# Patient Record
Sex: FEMALE | Race: WHITE | NOT HISPANIC OR LATINO | Employment: FULL TIME | URBAN - METROPOLITAN AREA
[De-identification: names, ages, dates, MRNs, and addresses within clinical notes are randomized per-mention and may not be internally consistent; named-entity substitution may affect disease eponyms.]

---

## 2019-06-10 ENCOUNTER — OFFICE VISIT (OUTPATIENT)
Dept: URGENT CARE | Facility: CLINIC | Age: 59
End: 2019-06-10
Payer: COMMERCIAL

## 2019-06-10 VITALS
OXYGEN SATURATION: 97 % | HEIGHT: 68 IN | TEMPERATURE: 97.7 F | DIASTOLIC BLOOD PRESSURE: 102 MMHG | WEIGHT: 197 LBS | BODY MASS INDEX: 29.86 KG/M2 | RESPIRATION RATE: 18 BRPM | SYSTOLIC BLOOD PRESSURE: 165 MMHG | HEART RATE: 84 BPM

## 2019-06-10 DIAGNOSIS — L23.7 POISON IVY DERMATITIS: Primary | ICD-10-CM

## 2019-06-10 DIAGNOSIS — T14.8XXA WOUND INFECTION: ICD-10-CM

## 2019-06-10 DIAGNOSIS — L08.9 WOUND INFECTION: ICD-10-CM

## 2019-06-10 PROCEDURE — 99203 OFFICE O/P NEW LOW 30 MIN: CPT | Performed by: EMERGENCY MEDICINE

## 2019-06-10 RX ORDER — PREDNISONE 10 MG/1
TABLET ORAL
Qty: 27 TABLET | Refills: 0 | Status: SHIPPED | OUTPATIENT
Start: 2019-06-10 | End: 2019-07-29 | Stop reason: ALTCHOICE

## 2019-06-10 RX ORDER — CLINDAMYCIN HYDROCHLORIDE 150 MG/1
150 CAPSULE ORAL EVERY 8 HOURS SCHEDULED
Qty: 21 CAPSULE | Refills: 0 | Status: SHIPPED | OUTPATIENT
Start: 2019-06-10 | End: 2019-06-17

## 2019-06-10 RX ORDER — DIPHENHYDRAMINE HCL 50 MG
50 CAPSULE ORAL EVERY 6 HOURS PRN
COMMUNITY

## 2019-06-10 RX ORDER — MULTIVITAMIN
1 CAPSULE ORAL DAILY
COMMUNITY

## 2019-07-29 ENCOUNTER — OFFICE VISIT (OUTPATIENT)
Dept: URGENT CARE | Facility: CLINIC | Age: 59
End: 2019-07-29
Payer: COMMERCIAL

## 2019-07-29 VITALS
OXYGEN SATURATION: 97 % | SYSTOLIC BLOOD PRESSURE: 126 MMHG | RESPIRATION RATE: 18 BRPM | HEART RATE: 95 BPM | WEIGHT: 196 LBS | HEIGHT: 68 IN | DIASTOLIC BLOOD PRESSURE: 69 MMHG | BODY MASS INDEX: 29.7 KG/M2 | TEMPERATURE: 98.2 F

## 2019-07-29 DIAGNOSIS — J06.9 VIRAL UPPER RESPIRATORY TRACT INFECTION: Primary | ICD-10-CM

## 2019-07-29 PROCEDURE — 99213 OFFICE O/P EST LOW 20 MIN: CPT | Performed by: EMERGENCY MEDICINE

## 2019-07-29 RX ORDER — RANITIDINE 150 MG/1
150 CAPSULE ORAL AS NEEDED
COMMUNITY

## 2019-07-29 RX ORDER — PREDNISONE 10 MG/1
TABLET ORAL
Qty: 27 TABLET | Refills: 0 | Status: SHIPPED | OUTPATIENT
Start: 2019-07-29

## 2019-07-29 RX ORDER — ALBUTEROL SULFATE 90 UG/1
2 AEROSOL, METERED RESPIRATORY (INHALATION) EVERY 6 HOURS PRN
Qty: 8.5 G | Refills: 0 | Status: SHIPPED | OUTPATIENT
Start: 2019-07-29

## 2019-07-29 NOTE — LETTER
July 29, 2019     Patient: Guerrero Tony   YOB: 1960   Date of Visit: 7/29/2019       To Whom it May Concern:    Guerrero Tony was seen in my clinic on 7/29/2019  She may return to work on Wednesday, July 31, 2019  If you have any questions or concerns, please don't hesitate to call           Sincerely,          Darshan Steve MD        CC: No Recipients

## 2019-07-29 NOTE — PROGRESS NOTES
[facility]        NAME: Izzy Genao is a 62 y o  female  : 1960    MRN: [id number]  DATE: 2019  TIME: 9:33 AM    Assessment and Plan   Viral upper respiratory tract infection [J06 9]  1  Viral upper respiratory tract infection  predniSONE 10 mg tablet    albuterol (PROAIR HFA) 90 mcg/act inhaler         Patient Instructions     Patient Instructions     You have been diagnosed with a Viral Upper Respiratory infection and your symptoms should resolve over the next 7 to 10 days with the treatments recommended today  If they do not, it is possible that you have developed a bacterial infection and you should return  If you were to take an antibiotic while you are still in the viral stage, you will not get better any faster, but could kill off the good germs in your body as well as make the germs in you resistant to the antibiotic  Take an expectorant - guaifenesin should be the only ingredient - during the day, and the cough suppressant (ex  Robitussin DM or Tessalon) if needed at night only  Take Zinc 12 5 to 15 mg every 2 - 3 hrs while awake for the next few days  You may take Cold John (13 3 mg of Zinc) or split a 25 mg Zinc tablet or lozenge in two or a 50 mg into four to get the proper dose  The total daily dose of Zinc should exceed 75 mg per day  You may also take a decongestant like Sudafed, unless you have hypertension or cardiac disease  Hold any NSAIDs like Ibuprofen (Advil), Naprosyn (Aleve), etc while on steroids like Medrol or Prednisone  If you are diabetic, you should also adhere strictly to your diet and monitor your blood sugar closely while on the steroids as discussed  Upper Respiratory Infection   AMBULATORY CARE:   An upper respiratory infection  is also called a common cold  It can affect your nose, throat, ears, and sinuses  Common signs and symptoms include the following:  Cold symptoms are usually worst for the first 3 to 5 days   You may have any of the following:  · Runny or stuffy nose    · Sneezing and coughing    · Sore throat or hoarseness    · Red, watery, and sore eyes    · Fatigue     · Chills and fever    · Headache, body aches, or sore muscles  Seek care immediately if:   · You have chest pain or trouble breathing  Contact your healthcare provider if:   · You have a fever over 102ºF (39°C)  · Your sore throat gets worse or you see white or yellow spots in your throat  · Your symptoms get worse after 3 to 5 days or your cold is not better in 14 days  · You have a rash anywhere on your skin  · You have large, tender lumps in your neck  · You have thick, green or yellow drainage from your nose  · You cough up thick yellow, green, or bloody mucus  · You have vomiting for more than 24 hours and cannot keep fluids down  · You have a bad earache  · You have questions or concerns about your condition or care  Treatment for a cold: There is no cure for the common cold  Colds are caused by viruses and do not get better with antibiotics  Most people get better in 7 to 14 days  You may continue to cough for 2 to 3 weeks  The following may help decrease your symptoms:  · Decongestants  help reduce nasal congestion and help you breathe more easily  If you take decongestant pills, they may make you feel restless or not able to sleep  Do not use decongestant sprays for more than a few days  · Cough suppressants  help reduce coughing  Ask your healthcare provider which type of cough medicine is best for you  · NSAIDs , such as ibuprofen, help decrease swelling, pain, and fever  NSAIDs can cause stomach bleeding or kidney problems in certain people  If you take blood thinner medicine, always ask your healthcare provider if NSAIDs are safe for you  Always read the medicine label and follow directions  · Acetaminophen  decreases pain and fever  It is available without a doctor's order  Ask how much to take and how often to take it  Follow directions  Read the labels of all other medicines you are using to see if they also contain acetaminophen, or ask your doctor or pharmacist  Acetaminophen can cause liver damage if not taken correctly  Do not use more than 4 grams (4,000 milligrams) total of acetaminophen in one day  Manage your cold:   · Rest as much as possible  Slowly start to do more each day  · Drink more liquids as directed  Liquids will help thin and loosen mucus so you can cough it up  Liquids will also help prevent dehydration  Liquids that help prevent dehydration include water, fruit juice, and broth  Do not drink liquids that contain caffeine  Caffeine can increase your risk for dehydration  Ask your healthcare provider how much liquid to drink each day  · Soothe a sore throat  Gargle with warm salt water  This helps your sore throat feel better  Make salt water by dissolving ¼ teaspoon salt in 1 cup warm water  You may also suck on hard candy or throat lozenges  You may use a sore throat spray  · Use a humidifier or vaporizer  Use a cool mist humidifier or a vaporizer to increase air moisture in your home  This may make it easier for you to breathe and help decrease your cough  · Use saline nasal drops as directed  These help relieve congestion  · Apply petroleum-based jelly around the outside of your nostrils  This can decrease irritation from blowing your nose  · Do not smoke  Nicotine and other chemicals in cigarettes and cigars can make your symptoms worse  They can also cause infections such as bronchitis or pneumonia  Ask your healthcare provider for information if you currently smoke and need help to quit  E-cigarettes or smokeless tobacco still contain nicotine  Talk to your healthcare provider before you use these products  Prevent spreading your cold to others:   · Try to stay away from other people during the first 2 to 3 days of your cold when it is more easily spread       · Do not share food or drinks  · Do not share hand towels with household members  · Wash your hands often, especially after you blow your nose  Turn away from other people and cover your mouth and nose with a tissue when you sneeze or cough  Follow up with your healthcare provider as directed:  Write down your questions so you remember to ask them during your visits  © 2017 [employer] Information is for End User's use only and may not be sold, redistributed or otherwise used for commercial purposes  All illustrations and images included in CareNotes® are the copyrighted property of [employer]  or [employer]  The above information is an  only  It is not intended as medical advice for individual conditions or treatments  Talk to your doctor, nurse or pharmacist before following any medical regimen to see if it is safe and effective for you  Follow up with PCP in 3-5 days  Proceed to  ER if symptoms worsen  Chief Complaint     Chief Complaint   Patient presents with    Cough     began 2 days ago with cough, wheezing, tightness with taking a breath, fever 102 1 max  Tx with Advil cold and sinus with no improvement         History of Present Illness       Patient with cough and congestion for past 2 days  She admits fever but denies chills  She denies shortness of breath  Review of Systems   Review of Systems   Constitutional: Negative for chills and fever  HENT: Positive for congestion, rhinorrhea, sinus pressure and sore throat  Negative for trouble swallowing and voice change  Respiratory: Positive for cough and chest tightness  Negative for shortness of breath and wheezing  Cardiovascular: Negative for chest pain           Current Medications       Current Outpatient Medications:     diphenhydrAMINE (BENADRYL) 50 mg capsule, Take 50 mg by mouth every 6 (six) hours as needed for itching, Disp: , Rfl:     Multiple Vitamin (MULTIVITAMIN) capsule, Take 1 capsule by mouth daily, Disp: , Rfl:     albuterol (PROAIR HFA) 90 mcg/act inhaler, Inhale 2 puffs every 6 (six) hours as needed for wheezing, Disp: 8 5 g, Rfl: 0    predniSONE 10 mg tablet, Take once daily all days pills on this schedule 6- 6- 5- 4- 3- 2- 1, Disp: 27 tablet, Rfl: 0    ranitidine (ZANTAC) 150 MG capsule, Take 150 mg by mouth as needed for indigestion or heartburn, Disp: , Rfl:     Current Allergies     Allergies as of 2019 - Reviewed 2019   Allergen Reaction Noted    Shellfish-derived products Anaphylaxis 06/10/2019            The following portions of the patient's history were reviewed and updated as appropriate: allergies, current medications, past family history, past medical history, past social history, past surgical history and problem list      Past Medical History:   Diagnosis Date    Hypoglycemia        Past Surgical History:   Procedure Laterality Date     SECTION      ECTOPIC PREGNANCY SURGERY      SEPTOPLASTY      TONSILLECTOMY         History reviewed  No pertinent family history  Medications have been verified  Objective   /69   Pulse 95   Temp 98 2 °F (36 8 °C) (Tympanic)   Resp 18   Ht 5' 8" (1 727 m)   Wt 88 9 kg (196 lb)   SpO2 97%   BMI 29 80 kg/m²        Physical Exam     Physical Exam   Constitutional: She is oriented to person, place, and time  She appears well-developed and well-nourished  No distress  HENT:   Head: Normocephalic and atraumatic  Right Ear: Tympanic membrane and external ear normal    Left Ear: Tympanic membrane and external ear normal    Nose: Mucosal edema present  Mouth/Throat: Posterior oropharyngeal erythema present  No oropharyngeal exudate or tonsillar abscesses  Nasal mucosa congested, oropharynx mildly erythematous  Neck: Neck supple  Cardiovascular: Normal rate and regular rhythm  Pulmonary/Chest: Effort normal  No respiratory distress  She has no wheezes   She has no mark    Neurological: She is alert and oriented to person, place, and time  Skin: Skin is warm and dry  Psychiatric: She has a normal mood and affect  Her behavior is normal  Judgment and thought content normal    Nursing note and vitals reviewed

## 2021-09-11 ENCOUNTER — OFFICE VISIT (OUTPATIENT)
Dept: URGENT CARE | Facility: CLINIC | Age: 61
End: 2021-09-11
Payer: COMMERCIAL

## 2021-09-11 VITALS — HEIGHT: 68 IN | BODY MASS INDEX: 28.79 KG/M2 | WEIGHT: 190 LBS

## 2021-09-11 DIAGNOSIS — J01.10 ACUTE FRONTAL SINUSITIS, RECURRENCE NOT SPECIFIED: ICD-10-CM

## 2021-09-11 DIAGNOSIS — R68.89 FLU-LIKE SYMPTOMS: Primary | ICD-10-CM

## 2021-09-11 DIAGNOSIS — R42 VERTIGO: ICD-10-CM

## 2021-09-11 PROCEDURE — U0003 INFECTIOUS AGENT DETECTION BY NUCLEIC ACID (DNA OR RNA); SEVERE ACUTE RESPIRATORY SYNDROME CORONAVIRUS 2 (SARS-COV-2) (CORONAVIRUS DISEASE [COVID-19]), AMPLIFIED PROBE TECHNIQUE, MAKING USE OF HIGH THROUGHPUT TECHNOLOGIES AS DESCRIBED BY CMS-2020-01-R: HCPCS | Performed by: EMERGENCY MEDICINE

## 2021-09-11 PROCEDURE — U0005 INFEC AGEN DETEC AMPLI PROBE: HCPCS | Performed by: EMERGENCY MEDICINE

## 2021-09-11 PROCEDURE — 99213 OFFICE O/P EST LOW 20 MIN: CPT | Performed by: EMERGENCY MEDICINE

## 2021-09-11 RX ORDER — AMOXICILLIN AND CLAVULANATE POTASSIUM 875; 125 MG/1; MG/1
1 TABLET, FILM COATED ORAL EVERY 12 HOURS SCHEDULED
Qty: 20 TABLET | Refills: 0 | Status: SHIPPED | OUTPATIENT
Start: 2021-09-11 | End: 2021-09-21

## 2021-09-11 RX ORDER — MECLIZINE HYDROCHLORIDE 25 MG/1
25 TABLET ORAL EVERY 8 HOURS PRN
Qty: 30 TABLET | Refills: 0 | Status: SHIPPED | OUTPATIENT
Start: 2021-09-11

## 2021-09-11 NOTE — LETTER
September 11, 2021     Patient: Maida Mathews   YOB: 1960   Date of Visit: 9/11/2021       To Whom it May Concern:    Maida Mathews was seen in my clinic on 9/11/2021  She should remain out of work/school for 10 days since symptom onset or 24 hours fever free without the use of fever reducing drugs, whichever is longer AND overall general improvement in symptoms OR 14 days since last exposure or negative results       If you have any questions or concerns, please don't hesitate to call           Sincerely,          Maria Eugenia De Luna MD        CC: No Recipients

## 2021-09-11 NOTE — PATIENT INSTRUCTIONS
You have been diagnosed with a flu-like illness, and your symptoms should resolve over the next 7 to 10 days with the treatments recommended today  If they do not, it is possible that you have developed a bacterial infection and you should return  If you were to take an antibiotic while you are still in the viral stage, you will not get better any faster, but could kill off  good germs in your body as well as make germs  resistant to the antibiotic  Take an expectorant - guaifenesin should be the only ingredient - during the day, and the cough suppressant (ex  Robitussin DM or Tessalon) if needed at night only  Take Zinc 12 5 to 15 mg every 2 - 3 hrs while awake for the next few days  You may take Cold John (13 3 mg of Zinc) or split a 25 mg Zinc tablet or lozenge in two or a 50 mg into four to get the proper dose  The total daily dose of Zinc should exceed 75 mg per day  You should also take Quercetin 500 mg twice daily  May take Flonase as discussed  You may also take vitamin D 3 2000 i u s per day for the next 1 week  You may also take a decongestant like Sudafed, unless you have hypertension or cardiac disease  You may take Imodium for diarrhea according to package instructions  Flu-like illness   AMBULATORY CARE:   Flu-like illness is an infection caused by a virus  The flu is easily spread when an infected person coughs, sneezes, or has close contact with others  You may be able to spread the flu to others for 1 week or longer after signs or symptoms appear  Common signs and symptoms include the following:   · Fever and chills    · Headaches, body aches, and muscle or joint pain    · Cough, runny nose, and sore throat    · Loss of appetite, nausea, vomiting, or diarrhea    · Tiredness    · Trouble breathing  Call 911 for any of the following:   · You have trouble breathing, and your lips look purple or blue  · You have a seizure    Seek care immediately if:   · You are dizzy, or you are urinating less or not at all  · You have a headache with a stiff neck, and you feel tired or confused  · You have new pain or pressure in your chest     · Your symptoms, such as shortness of breath, vomiting, or diarrhea, get worse  · Your symptoms, such as fever and coughing, seem to get better, but then get worse  Contact your healthcare provider if:   · You have new muscle pain or weakness  · You have questions or concerns about your condition or care  Treatment for influenza  may include any of the following:  · Acetaminophen  decreases pain and fever  It is available without a doctor's order  Ask how much to take and how often to take it  Follow directions  Acetaminophen can cause liver damage if not taken correctly  · NSAIDs , such as ibuprofen, help decrease swelling, pain, and fever  This medicine is available with or without a doctor's order  NSAIDs can cause stomach bleeding or kidney problems in certain people  If you take blood thinner medicine, always ask your healthcare provider if NSAIDs are safe for you  Always read the medicine label and follow directions  · Antivirals  help fight a viral infection  Manage your symptoms:   · Rest  as much as you can to help you recover  · Drink liquids as directed  to help prevent dehydration  Ask how much liquid to drink each day and which liquids are best for you  Prevent the spread of the flu:   · Wash your hands often  Use soap and water  Wash your hands after you use the bathroom, change a child's diapers, or sneeze  Wash your hands before you prepare or eat food  Use gel hand cleanser when soap and water are not available  Do not touch your eyes, nose, or mouth unless you have washed your hands first        · Cover your mouth when you sneeze or cough  Cough into a tissue or the bend of your arm  · Clean shared items with a germ-killing   Clean table surfaces, doorknobs, and light switches   Do not share towels, silverware, and dishes with people who are sick  Wash bed sheets, towels, silverware, and dishes with soap and water  · Wear a mask  over your mouth and nose if you are sick or are near anyone who is sick  · Stay away from others  if you are sick  · Influenza vaccine  helps prevent influenza (flu)  Everyone older than 6 months should get a yearly influenza vaccine  Get the vaccine as soon as it is available, usually in September or October each year  Follow up with your healthcare provider as directed:  Write down your questions so you remember to ask them during your visits  © 2017 [address] Information is for End User's use only and may not be sold, redistributed or otherwise used for commercial purposes  All illustrations and images included in CareNotes® are the copyrighted property of [employer]  or [employer]  The above information is an  only  It is not intended as medical advice for individual conditions or treatments  Talk to your doctor, nurse or pharmacist before following any medical regimen to see if it is safe and effective for you  [address]     Your healthcare provider and/or public health staff have evaluated you and have determined that you do not need to be hospitalized at this time  At this time you can be isolated at home where you will be monitored by staff from your local or state health department  You should carefully follow the prevention and isolation steps below until a healthcare provider or local or state health department says that you can return to your normal activities  Stay home except to get medical care     People who are mildly ill with COVID-19 are able to isolate at home during their illness  You should restrict activities outside your home, except for getting medical care  Do not go to work, school, or public areas   Avoid using public transportation, ride-sharing, or taxis  Separate yourself from other people and animals in your home     People: As much as possible, you should stay in a specific room and away from other people in your home  Also, you should use a separate bathroom, if available  Animals: You should restrict contact with pets and other animals while you are sick with COVID-19, just like you would around other people  Although there have not been reports of pets or other animals becoming sick with COVID-19, it is still recommended that people sick with COVID-19 limit contact with animals until more information is known about the virus  When possible, have another member of your household care for your animals while you are sick  If you are sick with COVID-19, avoid contact with your pet, including petting, snuggling, being kissed or licked, and sharing food  If you must care for your pet or be around animals while you are sick, wash your hands before and after you interact with pets and wear a facemask  See COVID-19 and Animals for more information  Call ahead before visiting your doctor     If you have a medical appointment, call the healthcare provider and tell them that you have or may have COVID-19  This will help the healthcare providers office take steps to keep other people from getting infected or exposed  Wear a facemask     You should wear a facemask when you are around other people (e g , sharing a room or vehicle) or pets and before you enter a healthcare providers office  If you are not able to wear a facemask (for example, because it causes trouble breathing), then people who live with you should not stay in the same room with you, or they should wear a facemask if they enter your room  Cover your coughs and sneezes     Cover your mouth and nose with a tissue when you cough or sneeze  Throw used tissues in a lined trash can   Immediately wash your hands with soap and water for at least 20 seconds or, if soap and water are not available, clean your hands with an alcohol-based hand  that contains at least 60% alcohol  Clean your hands often     Wash your hands often with soap and water for at least 20 seconds, especially after blowing your nose, coughing, or sneezing; going to the bathroom; and before eating or preparing food  If soap and water are not readily available, use an alcohol-based hand  with at least 60% alcohol, covering all surfaces of your hands and rubbing them together until they feel dry  Soap and water are the best option if hands are visibly dirty  Avoid touching your eyes, nose, and mouth with unwashed hands  Avoid sharing personal household items     You should not share dishes, drinking glasses, cups, eating utensils, towels, or bedding with other people or pets in your home  After using these items, they should be washed thoroughly with soap and water  Clean all high-touch surfaces everyday     High touch surfaces include counters, tabletops, doorknobs, bathroom fixtures, toilets, phones, keyboards, tablets, and bedside tables  Also, clean any surfaces that may have blood, stool, or body fluids on them  Use a household cleaning spray or wipe, according to the label instructions  Labels contain instructions for safe and effective use of the cleaning product including precautions you should take when applying the product, such as wearing gloves and making sure you have good ventilation during use of the product  Monitor your symptoms     Seek prompt medical attention if your illness is worsening (e g , difficulty breathing)  Before seeking care, call your healthcare provider and tell them that you have, or are being evaluated for, COVID-19  Put on a facemask before you enter the facility  These steps will help the healthcare providers office to keep other people in the office or waiting room from getting infected or exposed   Ask your healthcare provider to call the local or state health department  Persons who are placed under active monitoring or facilitated self-monitoring should follow instructions provided by their local health department or occupational health professionals, as appropriate  If you have a medical emergency and need to call 911, notify the dispatch personnel that you have, or are being evaluated for COVID-19  If possible, put on a facemask before emergency medical services arrive  Discontinuing home isolation     Patients with confirmed COVID-19 should remain under home isolation precautions until the risk of secondary transmission to others is thought to be low  The decision to discontinue home isolation precautions should be made on a case-by-case basis, in consultation with healthcare providers and state and local health departments  Source: [employer]        Proceed to ER if symptoms worsen    Sinusitis, Ambulatory Care   GENERAL INFORMATION:   Sinusitis  is inflammation or infection of your sinuses  It is most often caused by a virus  Acute sinusitis may last up to 12 weeks  Chronic sinusitis lasts longer than 12 weeks  Recurrent sinusitis is when you have 3 or more episodes of sinusitis in 1 year  Common symptoms include the following:   · Fever    · Pain, pressure, redness, or swelling around the forehead, cheeks, or eyes    · Thick yellow or green discharge from your nose    · Tenderness when you touch your face over your sinuses    · Dry cough that happens mostly at night or when you lie down    · Headache and face pain that is worse when you lean forward    · Teeth pain or pain when you chew  Seek immediate care for the following symptoms:   · Vision changes such as double vision    · Confusion or trouble thinking clearly    · Headache and stiff neck    · Trouble breathing  Treatment for sinusitis  may include medicines to relieve nasal and sinus congestion or to decrease pain and fever  Ask your healthcare provider which medicines you should take and how much is safe  Manage sinusitis:   · Drink liquids as directed  Ask your healthcare provider how much liquid to drink each day and which liquids are best for you  Liquids will help loosen and drain the mucus in your sinuses  · Breathe in steam   Heat a bowl of water until you see steam  Lean over the bowl and make a tent over your head with a large towel  Breathe deeply for about 20 minutes  Be careful not to get too close to the steam or burn yourself  Do this 3 times a day  You can also breathe deeply when you take a hot shower  · Rinse your sinuses  Use a sinus rinse device to rinse your nasal passages with a saline (salt water) solution  This will help thin the mucus in your nose and rinse away pollen and dirt  It will also help reduce swelling so you can breathe normally  Ask how often to do this  · Use heat on your sinuses  to decrease pain  Apply heat for 15 to 20 minutes every hour for as many days as directed  · Sleep with your head elevated  Place an extra pillow under your head before you go to sleep to help your sinuses drain  · Do not smoke and avoid secondhand smoke  If you smoke, it is never too late to quit  Ask for information about how to stop smoking if you need help  Prevent the spread of germs that cause sinusitis:  Wash your hands often with soap and water  Wash your hands after you use the bathroom, change a child's diaper, or sneeze  Wash your hands before you prepare or eat food  Follow up with your healthcare provider as directed:  Write down your questions so you remember to ask them during your visits  CARE AGREEMENT:   You have the right to help plan your care  Learn about your health condition and how it may be treated  Discuss treatment options with your caregivers to decide what care you want to receive  You always have the right to refuse treatment   The above information is an educational aid only  It is not intended as medical advice for individual conditions or treatments  Talk to your doctor, nurse or pharmacist before following any medical regimen to see if it is safe and effective for you  © 2014 [address] is for End User's use only and may not be sold, redistributed or otherwise used for commercial purposes  All illustrations and images included in CareNotes® are the copyrighted property of [employer] Inc  or [employer]   WHAT YOU NEED TO KNOW:   Vertigo is a condition that causes you to feel dizzy  You may feel that you or everything around you is moving or spinning  You may also feel like you are being pulled down or toward your side  DISCHARGE INSTRUCTIONS:   Return to the emergency department if:   · You have a headache and a stiff neck  · You have shaking chills and a fever  · You vomit over and over with no relief  · You have blood, pus, or fluid coming out of your ears  · You are confused  Contact your healthcare provider if:   · Your symptoms do not get better with treatment  · You have questions about your condition or care  Medicines:   · Medicine  may be given to help relieve your symptoms  · Take your medicine as directed  Contact your healthcare provider if you think your medicine is not helping or if you have side effects  Tell him or her if you are allergic to any medicine  Keep a list of the medicines, vitamins, and herbs you take  Include the amounts, and when and why you take them  Bring the list or the pill bottles to follow-up visits  Carry your medicine list with you in case of an emergency  Manage your symptoms:   · Do not drive , walk without help, or operate heavy machinery when you are dizzy  · Move slowly  when you move from one position to another position  Get up slowly from sitting or lying down  Sit or lie down right away if you feel dizzy  · Drink plenty of liquids  Liquids help prevent dehydration  Ask how much liquid to drink each day and which liquids are best for you  · Vestibular and balance rehabilitation therapy (VBRT)  is used to teach you exercises to improve your balance and strength  These exercises may help decrease your vertigo and improve your balance  Ask for more information about this therapy  Follow up with your healthcare provider as directed:  Write down your questions so you remember to ask them during your visits  © Copyright "[employer] 2021 Information is for End User's use only and may not be sold, redistributed or otherwise used for commercial purposes  All illustrations and images included in CareNotes® are the copyrighted property of [employer] Inc  or [employer]   The above information is an  only  It is not intended as medical advice for individual conditions or treatments  Talk to your doctor, nurse or pharmacist before following any medical regimen to see if it is safe and effective for you  Eustachian Tube Dysfunction   AMBULATORY CARE:   Eustachian tube dysfunction (ETD)  is a condition that prevents your eustachian tubes from opening properly  It can also cause them to become blocked  Eustachian tubes connect your middle ear to the back of your nose and throat  These tubes open and allow air to flow in and out when you sneeze, swallow, or yawn  Common signs and symptoms include the following:   · Fullness or pressure in your ears    · Muffled hearing, or a feeling you are hearing under water or have clogged ears    · Pain in one or both ears    · Ringing in your ears    · Popping, crackling, or clicking feeling in your ears    · Trouble keeping your balance    Call your doctor or otolaryngologist if:   · Your symptoms do not improve or get worse  · You have a fever  · You have any hearing loss  · You have questions or concerns about your condition or care      Treatment:  ETD may get better on its own without any treatment  If it continues, you may need any of the following:  · Swallow, yawn, or chew gum  to help open your eustachian tubes  Your healthcare provider may also recommend you blow with your mouth shut and your nostrils pinched closed  · Air pressure devices  push air into your nose and eustachian tubes to help relieve air pressure in your ear  · Treatment for allergies  such as decongestants, antihistamines, and nasal steroids may improve ETD  They may help decrease swelling of the eustachian tubes  · A myringotomy  is surgery to make a hole in your eardrum  The hole relieves pressure and lets fluid drain from your ear  A pressure equalizing (PE) tube may be used to keep the hole open and to help drain fluid  · Tuboplasty  is a procedure to widen your eustachian tubes  Follow up with your doctor or otolaryngologist as directed:  Write down your questions so you remember to ask them during your visits  © Copyright [employer] 2021 Information is for End User's use only and may not be sold, redistributed or otherwise used for commercial purposes  All illustrations and images included in CareNotes® are the copyrighted property of [employer]  or [facility] Charlene Jeffries   The above information is an  only  It is not intended as medical advice for individual conditions or treatments  Talk to your doctor, nurse or pharmacist before following any medical regimen to see if it is safe and effective for you

## 2021-09-11 NOTE — PROGRESS NOTES
330Values of n Now        NAME: Jenn Rojas is a 59 y o  female  : 1960    MRN: [id number]  DATE: 2021  TIME: 9:43 AM    Assessment and Plan   Flu-like symptoms [R68 89]  1  Flu-like symptoms  Novel Coronavirus (Covid-19),PCR [facility] [place] - Office Collection   2  Acute frontal sinusitis, recurrence not specified  amoxicillin-clavulanate (AUGMENTIN) 875-125 mg per tablet   3  Vertigo  meclizine (ANTIVERT) 25 mg tablet     Consider prednisone taper if COVID test negative and symptoms persist     Patient Instructions     Patient Instructions     You have been diagnosed with a flu-like illness, and your symptoms should resolve over the next 7 to 10 days with the treatments recommended today  If they do not, it is possible that you have developed a bacterial infection and you should return  If you were to take an antibiotic while you are still in the viral stage, you will not get better any faster, but could kill off  good germs in your body as well as make germs  resistant to the antibiotic  Take an expectorant - guaifenesin should be the only ingredient - during the day, and the cough suppressant (ex  Robitussin DM or Tessalon) if needed at night only  Take Zinc 12 5 to 15 mg every 2 - 3 hrs while awake for the next few days  You may take Cold John (13 3 mg of Zinc) or split a 25 mg Zinc tablet or lozenge in two or a 50 mg into four to get the proper dose  The total daily dose of Zinc should exceed 75 mg per day  You should also take Quercetin 500 mg twice daily  May take Flonase as discussed  You may also take vitamin D 3 2000 i u s per day for the next 1 week  You may also take a decongestant like Sudafed, unless you have hypertension or cardiac disease  You may take Imodium for diarrhea according to package instructions  Flu-like illness   AMBULATORY CARE:   Flu-like illness is an infection caused by a virus   The flu is easily spread when an infected person coughs, sneezes, or has close contact with others  You may be able to spread the flu to others for 1 week or longer after signs or symptoms appear  Common signs and symptoms include the following:   · Fever and chills    · Headaches, body aches, and muscle or joint pain    · Cough, runny nose, and sore throat    · Loss of appetite, nausea, vomiting, or diarrhea    · Tiredness    · Trouble breathing  Call 911 for any of the following:   · You have trouble breathing, and your lips look purple or blue  · You have a seizure  Seek care immediately if:   · You are dizzy, or you are urinating less or not at all  · You have a headache with a stiff neck, and you feel tired or confused  · You have new pain or pressure in your chest     · Your symptoms, such as shortness of breath, vomiting, or diarrhea, get worse  · Your symptoms, such as fever and coughing, seem to get better, but then get worse  Contact your healthcare provider if:   · You have new muscle pain or weakness  · You have questions or concerns about your condition or care  Treatment for influenza  may include any of the following:  · Acetaminophen  decreases pain and fever  It is available without a doctor's order  Ask how much to take and how often to take it  Follow directions  Acetaminophen can cause liver damage if not taken correctly  · NSAIDs , such as ibuprofen, help decrease swelling, pain, and fever  This medicine is available with or without a doctor's order  NSAIDs can cause stomach bleeding or kidney problems in certain people  If you take blood thinner medicine, always ask your healthcare provider if NSAIDs are safe for you  Always read the medicine label and follow directions  · Antivirals  help fight a viral infection  Manage your symptoms:   · Rest  as much as you can to help you recover  · Drink liquids as directed  to help prevent dehydration  Ask how much liquid to drink each day and which liquids are best for you    Prevent the spread of the flu:   · Wash your hands often  Use soap and water  Wash your hands after you use the bathroom, change a child's diapers, or sneeze  Wash your hands before you prepare or eat food  Use gel hand cleanser when soap and water are not available  Do not touch your eyes, nose, or mouth unless you have washed your hands first        · Cover your mouth when you sneeze or cough  Cough into a tissue or the bend of your arm  · Clean shared items with a germ-killing   Clean table surfaces, doorknobs, and light switches  Do not share towels, silverware, and dishes with people who are sick  Wash bed sheets, towels, silverware, and dishes with soap and water  · Wear a mask  over your mouth and nose if you are sick or are near anyone who is sick  · Stay away from others  if you are sick  · Influenza vaccine  helps prevent influenza (flu)  Everyone older than 6 months should get a yearly influenza vaccine  Get the vaccine as soon as it is available, usually in September or October each year  Follow up with your healthcare provider as directed:  Write down your questions so you remember to ask them during your visits  © 2017 [address] Information is for End User's use only and may not be sold, redistributed or otherwise used for commercial purposes  All illustrations and images included in CareNotes® are the copyrighted property of [employer]  or Jessee Luo  The above information is an  only  It is not intended as medical advice for individual conditions or treatments  Talk to your doctor, nurse or pharmacist before following any medical regimen to see if it is safe and effective for you  [address]     Your healthcare provider and/or public health staff have evaluated you and have determined that you do not need to be hospitalized at this time    At this time you can be isolated at home where you will be monitored by staff from your local or state health department  You should carefully follow the prevention and isolation steps below until a healthcare provider or local or state health department says that you can return to your normal activities  Stay home except to get medical care     People who are mildly ill with COVID-19 are able to isolate at home during their illness  You should restrict activities outside your home, except for getting medical care  Do not go to work, school, or public areas  Avoid using public transportation, ride-sharing, or taxis  Separate yourself from other people and animals in your home     People: As much as possible, you should stay in a specific room and away from other people in your home  Also, you should use a separate bathroom, if available  Animals: You should restrict contact with pets and other animals while you are sick with COVID-19, just like you would around other people  Although there have not been reports of pets or other animals becoming sick with COVID-19, it is still recommended that people sick with COVID-19 limit contact with animals until more information is known about the virus  When possible, have another member of your household care for your animals while you are sick  If you are sick with COVID-19, avoid contact with your pet, including petting, snuggling, being kissed or licked, and sharing food  If you must care for your pet or be around animals while you are sick, wash your hands before and after you interact with pets and wear a facemask  See COVID-19 and Animals for more information  Call ahead before visiting your doctor     If you have a medical appointment, call the healthcare provider and tell them that you have or may have COVID-19  This will help the healthcare providers office take steps to keep other people from getting infected or exposed       Wear a facemask     You should wear a facemask when you are around other people (e g , sharing a room or vehicle) or pets and before you enter a healthcare providers office  If you are not able to wear a facemask (for example, because it causes trouble breathing), then people who live with you should not stay in the same room with you, or they should wear a facemask if they enter your room  Cover your coughs and sneezes     Cover your mouth and nose with a tissue when you cough or sneeze  Throw used tissues in a lined trash can  Immediately wash your hands with soap and water for at least 20 seconds or, if soap and water are not available, clean your hands with an alcohol-based hand  that contains at least 60% alcohol  Clean your hands often     Wash your hands often with soap and water for at least 20 seconds, especially after blowing your nose, coughing, or sneezing; going to the bathroom; and before eating or preparing food  If soap and water are not readily available, use an alcohol-based hand  with at least 60% alcohol, covering all surfaces of your hands and rubbing them together until they feel dry  Soap and water are the best option if hands are visibly dirty  Avoid touching your eyes, nose, and mouth with unwashed hands  Avoid sharing personal household items     You should not share dishes, drinking glasses, cups, eating utensils, towels, or bedding with other people or pets in your home  After using these items, they should be washed thoroughly with soap and water  Clean all high-touch surfaces everyday     High touch surfaces include counters, tabletops, doorknobs, bathroom fixtures, toilets, phones, keyboards, tablets, and bedside tables  Also, clean any surfaces that may have blood, stool, or body fluids on them  Use a household cleaning spray or wipe, according to the label instructions   Labels contain instructions for safe and effective use of the cleaning product including precautions you should take when applying the product, such as wearing gloves and making sure you have good ventilation during use of the product  Monitor your symptoms     Seek prompt medical attention if your illness is worsening (e g , difficulty breathing)  Before seeking care, call your healthcare provider and tell them that you have, or are being evaluated for, COVID-19  Put on a facemask before you enter the facility  These steps will help the healthcare providers office to keep other people in the office or waiting room from getting infected or exposed  Ask your healthcare provider to call the local or state health department  Persons who are placed under active monitoring or facilitated self-monitoring should follow instructions provided by their local health department or occupational health professionals, as appropriate  If you have a medical emergency and need to call 911, notify the dispatch personnel that you have, or are being evaluated for COVID-19  If possible, put on a facemask before emergency medical services arrive  Discontinuing home isolation     Patients with confirmed COVID-19 should remain under home isolation precautions until the risk of secondary transmission to others is thought to be low  The decision to discontinue home isolation precautions should be made on a case-by-case basis, in consultation with healthcare providers and [facility] and [facility] health departments  Source: [employer]        Proceed to ER if symptoms worsen    Sinusitis, Ambulatory Care   GENERAL INFORMATION:   Sinusitis  is inflammation or infection of your sinuses  It is most often caused by a virus  Acute sinusitis may last up to 12 weeks  Chronic sinusitis lasts longer than 12 weeks  Recurrent sinusitis is when you have 3 or more episodes of sinusitis in 1 year    Common symptoms include the following:   · Fever    · Pain, pressure, redness, or swelling around the forehead, cheeks, or eyes    · Thick yellow or green discharge from your nose    · Tenderness when you touch your face over your sinuses    · Dry cough that happens mostly at night or when you lie down    · Headache and face pain that is worse when you lean forward    · Teeth pain or pain when you chew  Seek immediate care for the following symptoms:   · Vision changes such as double vision    · Confusion or trouble thinking clearly    · Headache and stiff neck    · Trouble breathing  Treatment for sinusitis  may include medicines to relieve nasal and sinus congestion or to decrease pain and fever  Ask your healthcare provider which medicines you should take and how much is safe  Manage sinusitis:   · Drink liquids as directed  Ask your healthcare provider how much liquid to drink each day and which liquids are best for you  Liquids will help loosen and drain the mucus in your sinuses  · Breathe in steam   Heat a bowl of water until you see steam  Lean over the bowl and make a tent over your head with a large towel  Breathe deeply for about 20 minutes  Be careful not to get too close to the steam or burn yourself  Do this 3 times a day  You can also breathe deeply when you take a hot shower  · Rinse your sinuses  Use a sinus rinse device to rinse your nasal passages with a saline (salt water) solution  This will help thin the mucus in your nose and rinse away pollen and dirt  It will also help reduce swelling so you can breathe normally  Ask how often to do this  · Use heat on your sinuses  to decrease pain  Apply heat for 15 to 20 minutes every hour for as many days as directed  · Sleep with your head elevated  Place an extra pillow under your head before you go to sleep to help your sinuses drain  · Do not smoke and avoid secondhand smoke  If you smoke, it is never too late to quit  Ask for information about how to stop smoking if you need help  Prevent the spread of germs that cause sinusitis:  Wash your hands often with soap and water   Wash your hands after you use the bathroom, change a child's diaper, or sneeze  Wash your hands before you prepare or eat food  Follow up with your healthcare provider as directed:  Write down your questions so you remember to ask them during your visits  CARE AGREEMENT:   You have the right to help plan your care  Learn about your health condition and how it may be treated  Discuss treatment options with your caregivers to decide what care you want to receive  You always have the right to refuse treatment  The above information is an  only  It is not intended as medical advice for individual conditions or treatments  Talk to your doctor, nurse or pharmacist before following any medical regimen to see if it is safe and effective for you  © 2014 [address] is for End User's use only and may not be sold, redistributed or otherwise used for commercial purposes  All illustrations and images included in CareNotes® are the copyrighted property of A [employer] A M [employer]  or Jessee Valerio  Vertigo   WHAT YOU NEED TO KNOW:   Vertigo is a condition that causes you to feel dizzy  You may feel that you or everything around you is moving or spinning  You may also feel like you are being pulled down or toward your side  DISCHARGE INSTRUCTIONS:   Return to the emergency department if:   · You have a headache and a stiff neck  · You have shaking chills and a fever  · You vomit over and over with no relief  · You have blood, pus, or fluid coming out of your ears  · You are confused  Contact your healthcare provider if:   · Your symptoms do not get better with treatment  · You have questions about your condition or care  Medicines:   · Medicine  may be given to help relieve your symptoms  · Take your medicine as directed  Contact your healthcare provider if you think your medicine is not helping or if you have side effects   Tell him or her if you are allergic to any medicine  Keep a list of the medicines, vitamins, and herbs you take  Include the amounts, and when and why you take them  Bring the list or the pill bottles to follow-up visits  Carry your medicine list with you in case of an emergency  Manage your symptoms:   · Do not drive , walk without help, or operate heavy machinery when you are dizzy  · Move slowly  when you move from one position to another position  Get up slowly from sitting or lying down  Sit or lie down right away if you feel dizzy  · Drink plenty of liquids  Liquids help prevent dehydration  Ask how much liquid to drink each day and which liquids are best for you  · Vestibular and balance rehabilitation therapy (VBRT)  is used to teach you exercises to improve your balance and strength  These exercises may help decrease your vertigo and improve your balance  Ask for more information about this therapy  Follow up with your healthcare provider as directed:  Write down your questions so you remember to ask them during your visits  © Copyright [employer] 2021 Information is for End User's use only and may not be sold, redistributed or otherwise used for commercial purposes  All illustrations and images included in CareNotes® are the copyrighted property of A D A M , Inc  or [facility]  The above information is an  only  It is not intended as medical advice for individual conditions or treatments  Talk to your doctor, nurse or pharmacist before following any medical regimen to see if it is safe and effective for you  Eustachian Tube Dysfunction   AMBULATORY CARE:   Eustachian tube dysfunction (ETD)  is a condition that prevents your eustachian tubes from opening properly  It can also cause them to become blocked  Eustachian tubes connect your middle ear to the back of your nose and throat  These tubes open and allow air to flow in and out when you sneeze, swallow, or yawn       Common signs and symptoms include the following:   · Fullness or pressure in your ears    · Muffled hearing, or a feeling you are hearing under water or have clogged ears    · Pain in one or both ears    · Ringing in your ears    · Popping, crackling, or clicking feeling in your ears    · Trouble keeping your balance    Call your doctor or otolaryngologist if:   · Your symptoms do not improve or get worse  · You have a fever  · You have any hearing loss  · You have questions or concerns about your condition or care  Treatment:  ETD may get better on its own without any treatment  If it continues, you may need any of the following:  · Swallow, yawn, or chew gum  to help open your eustachian tubes  Your healthcare provider may also recommend you blow with your mouth shut and your nostrils pinched closed  · Air pressure devices  push air into your nose and eustachian tubes to help relieve air pressure in your ear  · Treatment for allergies  such as decongestants, antihistamines, and nasal steroids may improve ETD  They may help decrease swelling of the eustachian tubes  · A myringotomy  is surgery to make a hole in your eardrum  The hole relieves pressure and lets fluid drain from your ear  A pressure equalizing (PE) tube may be used to keep the hole open and to help drain fluid  · Tuboplasty  is a procedure to widen your eustachian tubes  Follow up with your doctor or otolaryngologist as directed:  Write down your questions so you remember to ask them during your visits  © Copyright [employer] 2021 Information is for End User's use only and may not be sold, redistributed or otherwise used for commercial purposes  All illustrations and images included in CareNotes® are the copyrighted property of [employer]  or [facility] Charlene Jeffries   The above information is an  only  It is not intended as medical advice for individual conditions or treatments   Talk to your doctor, nurse or pharmacist before following any medical regimen to see if it is safe and effective for you  Follow up with PCP in 3-5 days  Proceed to  ER if symptoms worsen  Chief Complaint     Chief Complaint   Patient presents with    COVID-19     sinus and chest congestion with a cough and bilateral earache x 10 days  Intermittent cough  Pt is vaccinated  History of Present Illness       Patient complains of cough and congestion for the past 10 days now with thick, greenish nasal discharge  She also complains of right ear fullness for the past few days and had an episode of vertigo this morning  She claims long history of paroxysmal vertigo but no longer has any Antivert  Review of Systems   Review of Systems   Constitutional: Negative for chills and fever  HENT: Positive for congestion, ear pain, rhinorrhea, sinus pressure and sore throat  Negative for trouble swallowing and voice change  Respiratory: Positive for cough  Negative for chest tightness, shortness of breath and wheezing  Cardiovascular: Negative for chest pain  Neurological: Positive for dizziness           Current Medications       Current Outpatient Medications:     albuterol (PROAIR HFA) 90 mcg/act inhaler, Inhale 2 puffs every 6 (six) hours as needed for wheezing, Disp: 8 5 g, Rfl: 0    Aspirin Buf,CaCarb-MgCarb-MgO, 81 MG TABS, Take by mouth, Disp: , Rfl:     Multiple Vitamin (MULTIVITAMIN) capsule, Take 1 capsule by mouth daily, Disp: , Rfl:     amoxicillin-clavulanate (AUGMENTIN) 875-125 mg per tablet, Take 1 tablet by mouth every 12 (twelve) hours for 10 days, Disp: 20 tablet, Rfl: 0    diphenhydrAMINE (BENADRYL) 50 mg capsule, Take 50 mg by mouth every 6 (six) hours as needed for itching, Disp: , Rfl:     meclizine (ANTIVERT) 25 mg tablet, Take 1 tablet (25 mg total) by mouth every 8 (eight) hours as needed for dizziness, Disp: 30 tablet, Rfl: 0    predniSONE 10 mg tablet, Take once daily all days pills on this schedule 6- 6- 5- 4- 3- 2- 1, Disp: 27 tablet, Rfl: 0    ranitidine (ZANTAC) 150 MG capsule, Take 150 mg by mouth as needed for indigestion or heartburn, Disp: , Rfl:     Current Allergies     Allergies as of 2021 - Reviewed 2021   Allergen Reaction Noted    Shellfish-derived products - food allergy Anaphylaxis 06/10/2019            The following portions of the patient's history were reviewed and updated as appropriate: allergies, current medications, past family history, past medical history, past social history, past surgical history and problem list      Past Medical History:   Diagnosis Date    Hypoglycemia        Past Surgical History:   Procedure Laterality Date    ADENOIDECTOMY       SECTION      ECTOPIC PREGNANCY SURGERY      SEPTOPLASTY      TONSILLECTOMY         Family History   Problem Relation Age of Onset    Depression Mother     Heart disease Father          Medications have been verified  Objective   Ht 5' 8" (1 727 m)   Wt 86 2 kg (190 lb)   BMI 28 89 kg/m²        Physical Exam     Physical Exam  Vitals and nursing note reviewed  Constitutional:       General: She is not in acute distress  Appearance: She is well-developed  HENT:      Head: Normocephalic and atraumatic  Nose: Mucosal edema and congestion present  Mouth/Throat:      Pharynx: Posterior oropharyngeal erythema present  No oropharyngeal exudate  Tonsils: No tonsillar abscesses  Cardiovascular:      Rate and Rhythm: Regular rhythm  Comments: Mild tachycardia  Pulmonary:      Effort: Pulmonary effort is normal  No respiratory distress  Breath sounds: No wheezing or rales  Abdominal:      General: Bowel sounds are normal       Palpations: Abdomen is soft  Musculoskeletal:      Cervical back: Neck supple  Skin:     General: Skin is warm and dry  Findings: No rash  Neurological:      Mental Status: She is alert and oriented to person, place, and time     Psychiatric: Mood and Affect: Mood normal          Behavior: Behavior normal          Thought Content:  Thought content normal          Judgment: Judgment normal

## 2021-09-13 LAB — SARS-COV-2 RNA RESP QL NAA+PROBE: NEGATIVE

## 2022-01-03 ENCOUNTER — OFFICE VISIT (OUTPATIENT)
Dept: URGENT CARE | Facility: CLINIC | Age: 62
End: 2022-01-03
Payer: COMMERCIAL

## 2022-01-03 VITALS
HEART RATE: 100 BPM | TEMPERATURE: 97.9 F | OXYGEN SATURATION: 98 % | HEIGHT: 68 IN | RESPIRATION RATE: 18 BRPM | WEIGHT: 190 LBS | BODY MASS INDEX: 28.79 KG/M2

## 2022-01-03 DIAGNOSIS — R68.89 FLU-LIKE SYMPTOMS: Primary | ICD-10-CM

## 2022-01-03 PROCEDURE — 87636 SARSCOV2 & INF A&B AMP PRB: CPT | Performed by: EMERGENCY MEDICINE

## 2022-01-03 PROCEDURE — 99213 OFFICE O/P EST LOW 20 MIN: CPT | Performed by: EMERGENCY MEDICINE

## 2022-01-03 RX ORDER — ASPIRIN 81 MG/1
81 TABLET, CHEWABLE ORAL DAILY
COMMUNITY

## 2022-01-03 NOTE — LETTER
January 3, 2022     Patient: Sam Boles   YOB: 1960   Date of Visit: 1/3/2022       To Whom it May Concern:    Sam Boles was seen in my clinic on 1/3/2022  She should remain out of work/school for 10 days since symptom onset or 24 hours fever free without the use of fever reducing drugs, whichever is longer AND overall general improvement in symptoms OR 14 days since last exposure or negative results       If you have any questions or concerns, please don't hesitate to call           Sincerely,          Jasbir Koehler MD        CC: No Recipients

## 2022-01-03 NOTE — PROGRESS NOTES
[facility]        NAME: Shawna Moffett is a 59 y o  female  : 1960    MRN: [id number]  DATE: January 3, 2022  TIME: 9:09 AM    Assessment and Plan   Flu-like symptoms [R68 89]  1  Flu-like symptoms  COVID/FLU- Office Collect         Patient Instructions     Patient Instructions     You have been diagnosed with a flu-like illness, and your symptoms should resolve over the next 7 to 10 days with the treatments recommended today  If they do not, it is possible that you have developed a bacterial infection and you should return  If you were to take an antibiotic while you are still in the viral stage, you will not get better any faster, but could kill off good germs in your body as well as make germs resistant to the antibiotic  Take an expectorant - guaifenesin should be the only ingredient - during the day, and the cough suppressant (ex  Robitussin DM or Tessalon) if needed at night only  Take Zinc 50 mg every 12 hours for the next week  You should also take Quercetin 500 mg twice daily with it  You should also take vitamin D3 5000 i u s per day for the next 1 week, and vitamin-C 1 g daily  May take Flonase as discussed  You may also take a decongestant like Sudafed, unless you have hypertension or cardiac disease  You may take Imodium for diarrhea according to package instructions  Flu-like illness   AMBULATORY CARE:   Flu-like illness is an infection caused by a virus  The flu is easily spread when an infected person coughs, sneezes, or has close contact with others  You may be able to spread the flu to others for 1 week or longer after signs or symptoms appear     Common signs and symptoms include the following:   · Fever and chills    · Headaches, body aches, and muscle or joint pain    · Cough, runny nose, and sore throat    · Loss of appetite, nausea, vomiting, or diarrhea    · Tiredness    · Trouble breathing  Call 911 for any of the following:   · You have trouble breathing, and your lips look purple or blue  · You have a seizure  Seek care immediately if:   · You are dizzy, or you are urinating less or not at all  · You have a headache with a stiff neck, and you feel tired or confused  · You have new pain or pressure in your chest     · Your symptoms, such as shortness of breath, vomiting, or diarrhea, get worse  · Your symptoms, such as fever and coughing, seem to get better, but then get worse  Contact your healthcare provider if:   · You have new muscle pain or weakness  · You have questions or concerns about your condition or care  Treatment for influenza  may include any of the following:  · Acetaminophen decreases pain and fever  It is available without a doctor's order  Ask how much to take and how often to take it  Follow directions  Acetaminophen can cause liver damage if not taken correctly  · NSAIDs  such as ibuprofen, help decrease swelling, pain, and fever  This medicine is available with or without a doctor's order  NSAIDs can cause stomach bleeding or kidney problems in certain people  If you take blood thinner medicine, always ask your healthcare provider if NSAIDs are safe for you  Always read the medicine label and follow directions  · Antivirals  help fight a viral infection  Manage your symptoms:   · Rest  as much as you can to help you recover  · Drink liquids as directed  to help prevent dehydration  Ask how much liquid to drink each day and which liquids are best for you  Prevent the spread of the flu:   · Wash your hands often  Use soap and water  Wash your hands after you use the bathroom, change a child's diapers, or sneeze  Wash your hands before you prepare or eat food  Use gel hand cleanser when soap and water are not available  Do not touch your eyes, nose, or mouth unless you have washed your hands first        · Cover your mouth when you sneeze or cough  Cough into a tissue or the bend of your arm      · Clean shared items with a germ-killing   Clean table surfaces, doorknobs, and light switches  Do not share towels, silverware, and dishes with people who are sick  Wash bed sheets, towels, silverware, and dishes with soap and water  · Wear a mask  over your mouth and nose if you are sick or are near anyone who is sick  · Stay away from others  if you are sick  · Influenza vaccine  helps prevent influenza (flu)  Everyone older than 6 months should get a yearly influenza vaccine  Get the vaccine as soon as it is available, usually in September or October each year  Follow up with your healthcare provider as directed:  Write down your questions so you remember to ask them during your visits  © 2017 [address] Information is for End User's use only and may not be sold, redistributed or otherwise used for commercial purposes  All illustrations and images included in CareNotes® are the copyrighted property of [employer]  or [employer]  The above information is an  only  It is not intended as medical advice for individual conditions or treatments  Talk to your doctor, nurse or pharmacist before following any medical regimen to see if it is safe and effective for you  [address]     Your healthcare provider and/or public health staff have evaluated you and have determined that you do not need to be hospitalized at this time  At this time you can be isolated at home where you will be monitored by staff from your local or state health department  You should carefully follow the prevention and isolation steps below until a healthcare provider or local or state health department says that you can return to your normal activities  Stay home except to get medical care     People who are mildly ill with COVID-19 are able to isolate at home during their illness  You should restrict activities outside your home, except for getting medical care  Do not go to work, school, or public areas  Avoid using public transportation, ride-sharing, or taxis  Separate yourself from other people and animals in your home     People: As much as possible, you should stay in a specific room and away from other people in your home  Also, you should use a separate bathroom, if available  Animals: You should restrict contact with pets and other animals while you are sick with COVID-19, just like you would around other people  Although there have not been reports of pets or other animals becoming sick with COVID-19, it is still recommended that people sick with COVID-19 limit contact with animals until more information is known about the virus  When possible, have another member of your household care for your animals while you are sick  If you are sick with COVID-19, avoid contact with your pet, including petting, snuggling, being kissed or licked, and sharing food  If you must care for your pet or be around animals while you are sick, wash your hands before and after you interact with pets and wear a facemask  See COVID-19 and Animals for more information  Call ahead before visiting your doctor     If you have a medical appointment, call the healthcare provider and tell them that you have or may have COVID-19  This will help the healthcare providers office take steps to keep other people from getting infected or exposed  Wear a facemask     You should wear a facemask when you are around other people (e g , sharing a room or vehicle) or pets and before you enter a healthcare providers office  If you are not able to wear a facemask (for example, because it causes trouble breathing), then people who live with you should not stay in the same room with you, or they should wear a facemask if they enter your room  Cover your coughs and sneezes     Cover your mouth and nose with a tissue when you cough or sneeze  Throw used tissues in a lined trash can   Immediately wash your hands with soap and water for at least 20 seconds or, if soap and water are not available, clean your hands with an alcohol-based hand  that contains at least 60% alcohol  Clean your hands often     Wash your hands often with soap and water for at least 20 seconds, especially after blowing your nose, coughing, or sneezing; going to the bathroom; and before eating or preparing food  If soap and water are not readily available, use an alcohol-based hand  with at least 60% alcohol, covering all surfaces of your hands and rubbing them together until they feel dry  Soap and water are the best option if hands are visibly dirty  Avoid touching your eyes, nose, and mouth with unwashed hands  Avoid sharing personal household items     You should not share dishes, drinking glasses, cups, eating utensils, towels, or bedding with other people or pets in your home  After using these items, they should be washed thoroughly with soap and water  Clean all high-touch surfaces everyday     High touch surfaces include counters, tabletops, doorknobs, bathroom fixtures, toilets, phones, keyboards, tablets, and bedside tables  Also, clean any surfaces that may have blood, stool, or body fluids on them  Use a household cleaning spray or wipe, according to the label instructions  Labels contain instructions for safe and effective use of the cleaning product including precautions you should take when applying the product, such as wearing gloves and making sure you have good ventilation during use of the product  Monitor your symptoms     Seek prompt medical attention if your illness is worsening (e g , difficulty breathing)  Before seeking care, call your healthcare provider and tell them that you have, or are being evaluated for, COVID-19  Put on a facemask before you enter the facility   These steps will help the healthcare providers office to keep other people in the office or waiting room from getting infected or exposed  Ask your healthcare provider to call the local or state health department  Persons who are placed under active monitoring or facilitated self-monitoring should follow instructions provided by their local health department or occupational health professionals, as appropriate  If you have a medical emergency and need to call 911, notify the dispatch personnel that you have, or are being evaluated for COVID-19  If possible, put on a facemask before emergency medical services arrive  Discontinuing home isolation     Patients with confirmed COVID-19 should remain under home isolation precautions until the risk of secondary transmission to others is thought to be low  The decision to discontinue home isolation precautions should be made on a case-by-case basis, in consultation with healthcare providers and [facility] and [facility] health departments  Source: [employer]        Proceed to ER if symptoms worsen        Follow up with PCP in 3-5 days  Proceed to  ER if symptoms worsen  Chief Complaint     Chief Complaint   Patient presents with    COVID-19     c/o sore throat, sinus congestion, muscles aches, low grade fever and exposure to 5 people         History of Present Illness       Patient complains of sore throat, cough, congestion, generalized aches, fevers for the past 2 days  Review of Systems   Review of Systems   Constitutional: Positive for fever  Negative for appetite change, chills and fatigue  HENT: Positive for congestion, rhinorrhea, sinus pressure and sore throat  Negative for trouble swallowing and voice change  Respiratory: Positive for cough  Negative for chest tightness, shortness of breath and wheezing  Cardiovascular: Negative for chest pain  Gastrointestinal: Negative for nausea  Musculoskeletal: Positive for myalgias           Current Medications       Current Outpatient Medications:     aspirin 81 mg chewable tablet, Chew 81 mg daily, Disp: , Rfl:     diphenhydrAMINE (BENADRYL) 50 mg capsule, Take 50 mg by mouth every 6 (six) hours as needed for itching, Disp: , Rfl:     Multiple Vitamin (MULTIVITAMIN) capsule, Take 1 capsule by mouth daily, Disp: , Rfl:     albuterol (PROAIR HFA) 90 mcg/act inhaler, Inhale 2 puffs every 6 (six) hours as needed for wheezing, Disp: 8 5 g, Rfl: 0    Aspirin Buf,CaCarb-MgCarb-MgO, 81 MG TABS, Take by mouth, Disp: , Rfl:     meclizine (ANTIVERT) 25 mg tablet, Take 1 tablet (25 mg total) by mouth every 8 (eight) hours as needed for dizziness, Disp: 30 tablet, Rfl: 0    predniSONE 10 mg tablet, Take once daily all days pills on this schedule 6- 6- 5- 4- 3- 2- 1, Disp: 27 tablet, Rfl: 0    ranitidine (ZANTAC) 150 MG capsule, Take 150 mg by mouth as needed for indigestion or heartburn, Disp: , Rfl:     Current Allergies     Allergies as of 2022 - Reviewed 2022   Allergen Reaction Noted    Shellfish-derived products - food allergy Anaphylaxis 06/10/2019    Azithromycin Rash 10/10/2012            The following portions of the patient's history were reviewed and updated as appropriate: allergies, current medications, past family history, past medical history, past social history, past surgical history and problem list      Past Medical History:   Diagnosis Date    Hypoglycemia        Past Surgical History:   Procedure Laterality Date    ADENOIDECTOMY       SECTION      ECTOPIC PREGNANCY SURGERY      SEPTOPLASTY      TONSILLECTOMY         Family History   Problem Relation Age of Onset    Depression Mother     Heart disease Father          Medications have been verified  Objective   Pulse 100   Temp 97 9 °F (36 6 °C)   Resp 18   Ht 5' 8" (1 727 m)   Wt 86 2 kg (190 lb)   SpO2 98%   BMI 28 89 kg/m²        Physical Exam     Physical Exam  Vitals and nursing note reviewed     Constitutional:       General: She is not in acute distress  Appearance: She is well-developed  HENT:      Head: Normocephalic and atraumatic  Nose: Mucosal edema and congestion present  Mouth/Throat:      Pharynx: Oropharynx is clear  Posterior oropharyngeal erythema present  No oropharyngeal exudate  Tonsils: No tonsillar abscesses  Cardiovascular:      Rate and Rhythm: Normal rate and regular rhythm  Heart sounds: Normal heart sounds  No murmur heard  No friction rub  No gallop  Pulmonary:      Effort: Pulmonary effort is normal  No respiratory distress  Breath sounds: No wheezing or rales  Musculoskeletal:      Cervical back: Neck supple  Skin:     General: Skin is warm and dry  Neurological:      Mental Status: She is alert and oriented to person, place, and time  Psychiatric:         Mood and Affect: Mood normal          Behavior: Behavior normal          Thought Content:  Thought content normal          Judgment: Judgment normal

## 2022-01-03 NOTE — PATIENT INSTRUCTIONS
You have been diagnosed with a flu-like illness, and your symptoms should resolve over the next 7 to 10 days with the treatments recommended today  If they do not, it is possible that you have developed a bacterial infection and you should return  If you were to take an antibiotic while you are still in the viral stage, you will not get better any faster, but could kill off good germs in your body as well as make germs resistant to the antibiotic  Take an expectorant - guaifenesin should be the only ingredient - during the day, and the cough suppressant (ex  Robitussin DM or Tessalon) if needed at night only  Take Zinc 50 mg every 12 hours for the next week  You should also take Quercetin 500 mg twice daily with it  You should also take vitamin D3 5000 i u s per day for the next 1 week, and vitamin-C 1 g daily  May take Flonase as discussed  You may also take a decongestant like Sudafed, unless you have hypertension or cardiac disease  You may take Imodium for diarrhea according to package instructions  Flu-like illness   AMBULATORY CARE:   Flu-like illness is an infection caused by a virus  The flu is easily spread when an infected person coughs, sneezes, or has close contact with others  You may be able to spread the flu to others for 1 week or longer after signs or symptoms appear  Common signs and symptoms include the following:   · Fever and chills    · Headaches, body aches, and muscle or joint pain    · Cough, runny nose, and sore throat    · Loss of appetite, nausea, vomiting, or diarrhea    · Tiredness    · Trouble breathing  Call 911 for any of the following:   · You have trouble breathing, and your lips look purple or blue  · You have a seizure  Seek care immediately if:   · You are dizzy, or you are urinating less or not at all  · You have a headache with a stiff neck, and you feel tired or confused      · You have new pain or pressure in your chest     · Your symptoms, such as shortness of breath, vomiting, or diarrhea, get worse  · Your symptoms, such as fever and coughing, seem to get better, but then get worse  Contact your healthcare provider if:   · You have new muscle pain or weakness  · You have questions or concerns about your condition or care  Treatment for influenza  may include any of the following:  · Acetaminophen decreases pain and fever  It is available without a doctor's order  Ask how much to take and how often to take it  Follow directions  Acetaminophen can cause liver damage if not taken correctly  · NSAIDs  such as ibuprofen, help decrease swelling, pain, and fever  This medicine is available with or without a doctor's order  NSAIDs can cause stomach bleeding or kidney problems in certain people  If you take blood thinner medicine, always ask your healthcare provider if NSAIDs are safe for you  Always read the medicine label and follow directions  · Antivirals  help fight a viral infection  Manage your symptoms:   · Rest  as much as you can to help you recover  · Drink liquids as directed  to help prevent dehydration  Ask how much liquid to drink each day and which liquids are best for you  Prevent the spread of the flu:   · Wash your hands often  Use soap and water  Wash your hands after you use the bathroom, change a child's diapers, or sneeze  Wash your hands before you prepare or eat food  Use gel hand cleanser when soap and water are not available  Do not touch your eyes, nose, or mouth unless you have washed your hands first        · Cover your mouth when you sneeze or cough  Cough into a tissue or the bend of your arm  · Clean shared items with a germ-killing   Clean table surfaces, doorknobs, and light switches  Do not share towels, silverware, and dishes with people who are sick  Wash bed sheets, towels, silverware, and dishes with soap and water       · Wear a mask  over your mouth and nose if you are sick or are near anyone who is sick  · Stay away from others  if you are sick  · Influenza vaccine  helps prevent influenza (flu)  Everyone older than 6 months should get a yearly influenza vaccine  Get the vaccine as soon as it is available, usually in September or October each year  Follow up with your healthcare provider as directed:  Write down your questions so you remember to ask them during your visits  © 2017 [address] Information is for End User's use only and may not be sold, redistributed or otherwise used for commercial purposes  All illustrations and images included in CareNotes® are the copyrighted property of [employer]  or Jessee Luo  The above information is an  only  It is not intended as medical advice for individual conditions or treatments  Talk to your doctor, nurse or pharmacist before following any medical regimen to see if it is safe and effective for you  [address]     Your healthcare provider and/or public health staff have evaluated you and have determined that you do not need to be hospitalized at this time  At this time you can be isolated at home where you will be monitored by staff from your local or state health department  You should carefully follow the prevention and isolation steps below until a healthcare provider or local or state health department says that you can return to your normal activities  Stay home except to get medical care     People who are mildly ill with COVID-19 are able to isolate at home during their illness  You should restrict activities outside your home, except for getting medical care  Do not go to work, school, or public areas  Avoid using public transportation, ride-sharing, or taxis  Separate yourself from other people and animals in your home     People: As much as possible, you should stay in a specific room and away from other people in your home   Also, you should use a separate bathroom, if available  Animals: You should restrict contact with pets and other animals while you are sick with COVID-19, just like you would around other people  Although there have not been reports of pets or other animals becoming sick with COVID-19, it is still recommended that people sick with COVID-19 limit contact with animals until more information is known about the virus  When possible, have another member of your household care for your animals while you are sick  If you are sick with COVID-19, avoid contact with your pet, including petting, snuggling, being kissed or licked, and sharing food  If you must care for your pet or be around animals while you are sick, wash your hands before and after you interact with pets and wear a facemask  See COVID-19 and Animals for more information  Call ahead before visiting your doctor     If you have a medical appointment, call the healthcare provider and tell them that you have or may have COVID-19  This will help the healthcare providers office take steps to keep other people from getting infected or exposed  Wear a facemask     You should wear a facemask when you are around other people (e g , sharing a room or vehicle) or pets and before you enter a healthcare providers office  If you are not able to wear a facemask (for example, because it causes trouble breathing), then people who live with you should not stay in the same room with you, or they should wear a facemask if they enter your room  Cover your coughs and sneezes     Cover your mouth and nose with a tissue when you cough or sneeze  Throw used tissues in a lined trash can  Immediately wash your hands with soap and water for at least 20 seconds or, if soap and water are not available, clean your hands with an alcohol-based hand  that contains at least 60% alcohol       Clean your hands often     Wash your hands often with soap and water for at least 20 seconds, especially after blowing your nose, coughing, or sneezing; going to the bathroom; and before eating or preparing food  If soap and water are not readily available, use an alcohol-based hand  with at least 60% alcohol, covering all surfaces of your hands and rubbing them together until they feel dry  Soap and water are the best option if hands are visibly dirty  Avoid touching your eyes, nose, and mouth with unwashed hands  Avoid sharing personal household items     You should not share dishes, drinking glasses, cups, eating utensils, towels, or bedding with other people or pets in your home  After using these items, they should be washed thoroughly with soap and water  Clean all high-touch surfaces everyday     High touch surfaces include counters, tabletops, doorknobs, bathroom fixtures, toilets, phones, keyboards, tablets, and bedside tables  Also, clean any surfaces that may have blood, stool, or body fluids on them  Use a household cleaning spray or wipe, according to the label instructions  Labels contain instructions for safe and effective use of the cleaning product including precautions you should take when applying the product, such as wearing gloves and making sure you have good ventilation during use of the product  Monitor your symptoms     Seek prompt medical attention if your illness is worsening (e g , difficulty breathing)  Before seeking care, call your healthcare provider and tell them that you have, or are being evaluated for, COVID-19  Put on a facemask before you enter the facility  These steps will help the healthcare providers office to keep other people in the office or waiting room from getting infected or exposed  Ask your healthcare provider to call the local or [facility] health department   Persons who are placed under active monitoring or facilitated self-monitoring should follow instructions provided by their local health department or occupational health professionals, as appropriate  If you have a medical emergency and need to call 911, notify the dispatch personnel that you have, or are being evaluated for COVID-19  If possible, put on a facemask before emergency medical services arrive  Discontinuing home isolation     Patients with confirmed COVID-19 should remain under home isolation precautions until the risk of secondary transmission to others is thought to be low  The decision to discontinue home isolation precautions should be made on a case-by-case basis, in consultation with healthcare providers and state and local health departments       Source: [employer]        Proceed to ER if symptoms worsen

## 2022-01-06 ENCOUNTER — TELEPHONE (OUTPATIENT)
Dept: URGENT CARE | Facility: CLINIC | Age: 62
End: 2022-01-06

## 2022-01-07 ENCOUNTER — TELEPHONE (OUTPATIENT)
Dept: URGENT CARE | Facility: CLINIC | Age: 62
End: 2022-01-07

## 2022-01-07 LAB
FLUAV RNA RESP QL NAA+PROBE: NEGATIVE
FLUBV RNA RESP QL NAA+PROBE: NEGATIVE
SARS-COV-2 RNA RESP QL NAA+PROBE: NEGATIVE

## 2022-07-17 ENCOUNTER — OFFICE VISIT (OUTPATIENT)
Dept: URGENT CARE | Facility: CLINIC | Age: 62
End: 2022-07-17
Payer: COMMERCIAL

## 2022-07-17 VITALS
RESPIRATION RATE: 16 BRPM | SYSTOLIC BLOOD PRESSURE: 163 MMHG | WEIGHT: 185 LBS | TEMPERATURE: 98 F | DIASTOLIC BLOOD PRESSURE: 67 MMHG | HEIGHT: 68 IN | HEART RATE: 78 BPM | BODY MASS INDEX: 28.04 KG/M2 | OXYGEN SATURATION: 99 %

## 2022-07-17 DIAGNOSIS — S91.331A PUNCTURE WOUND OF RIGHT FOOT, INITIAL ENCOUNTER: Primary | ICD-10-CM

## 2022-07-17 PROCEDURE — 99213 OFFICE O/P EST LOW 20 MIN: CPT | Performed by: EMERGENCY MEDICINE

## 2022-07-17 RX ORDER — CEPHALEXIN 500 MG/1
500 CAPSULE ORAL EVERY 8 HOURS SCHEDULED
Qty: 15 CAPSULE | Refills: 0 | Status: SHIPPED | OUTPATIENT
Start: 2022-07-17 | End: 2022-07-22

## 2022-07-17 NOTE — PATIENT INSTRUCTIONS
Puncture Wound   WHAT YOU NEED TO KNOW:   A puncture wound is a hole in the skin made by a sharp, pointed object  The area may be bruised or swollen  You may have bleeding, pain, or trouble moving the affected area  DISCHARGE INSTRUCTIONS:   Return to the emergency department if:   You have severe pain  You have numbness or tingling in the area of your wound  Your wound starts bleeding and does not stop, even after you apply pressure  Call your doctor if:   You have new drainage or a bad odor coming from the wound  You have a fever or chills  You have increased swelling, redness, or pain  You have red streaks on your skin coming from your wound  You have questions or concerns about your condition or care  Medicines: You may need any of the following:  NSAIDs , such as ibuprofen, help decrease swelling, pain, and fever  This medicine is available with or without a doctor's order  NSAIDs can cause stomach bleeding or kidney problems in certain people  If you take blood thinner medicine, always ask your healthcare provider if NSAIDs are safe for you  Always read the medicine label and follow directions  Antibiotics  help prevent a bacterial infection  Take your medicine as directed  Contact your healthcare provider if you think your medicine is not helping or if you have side effects  Tell him of her if you are allergic to any medicine  Keep a list of the medicines, vitamins, and herbs you take  Include the amounts, and when and why you take them  Bring the list or the pill bottles to follow-up visits  Carry your medicine list with you in case of an emergency  Care for your wound as directed:  Keep your wound clean and dry  When you are allowed to bathe, carefully wash the wound with soap and water  Dry the area and put on new, clean bandages as directed  Change your bandages when they get wet or dirty    Rest and elevate  the injured area above the level of your heart as often as you can  This will help decrease swelling and pain  Prop your injured area on pillows or blankets to keep it elevated comfortably  Follow up with your doctor in 2 to 3 days:  Write down your questions so you remember to ask them during your visits  © Copyright [employer] 2022 Information is for End User's use only and may not be sold, redistributed or otherwise used for commercial purposes  All illustrations and images included in CareNotes® are the copyrighted property of [employer]  or Fito Jeffries   The above information is an  only  It is not intended as medical advice for individual conditions or treatments  Talk to your doctor, nurse or pharmacist before following any medical regimen to see if it is safe and effective for you

## 2022-07-17 NOTE — PROGRESS NOTES
[facility]        NAME: Jose Ramesh is a 57 y o  female  : 1960    MRN: [id number]  DATE: 2022  TIME: 1:10 PM    Assessment and Plan   Puncture wound of right foot, initial encounter [S91 331A]  1  Puncture wound of right foot, initial encounter  cephalexin (KEFLEX) 500 mg capsule         Patient Instructions     Patient Instructions     Puncture Wound   WHAT YOU NEED TO KNOW:   A puncture wound is a hole in the skin made by a sharp, pointed object  The area may be bruised or swollen  You may have bleeding, pain, or trouble moving the affected area  DISCHARGE INSTRUCTIONS:   Return to the emergency department if:   · You have severe pain  · You have numbness or tingling in the area of your wound  · Your wound starts bleeding and does not stop, even after you apply pressure  Call your doctor if:   · You have new drainage or a bad odor coming from the wound  · You have a fever or chills  · You have increased swelling, redness, or pain  · You have red streaks on your skin coming from your wound  · You have questions or concerns about your condition or care  Medicines: You may need any of the following:  · NSAIDs , such as ibuprofen, help decrease swelling, pain, and fever  This medicine is available with or without a doctor's order  NSAIDs can cause stomach bleeding or kidney problems in certain people  If you take blood thinner medicine, always ask your healthcare provider if NSAIDs are safe for you  Always read the medicine label and follow directions  · Antibiotics  help prevent a bacterial infection  · Take your medicine as directed  Contact your healthcare provider if you think your medicine is not helping or if you have side effects  Tell him of her if you are allergic to any medicine  Keep a list of the medicines, vitamins, and herbs you take  Include the amounts, and when and why you take them   Bring the list or the pill bottles to follow-up visits  Carry your medicine list with you in case of an emergency  Care for your wound as directed:  Keep your wound clean and dry  When you are allowed to bathe, carefully wash the wound with soap and water  Dry the area and put on new, clean bandages as directed  Change your bandages when they get wet or dirty  Rest and elevate  the injured area above the level of your heart as often as you can  This will help decrease swelling and pain  Prop your injured area on pillows or blankets to keep it elevated comfortably  Follow up with your doctor in 2 to 3 days:  Write down your questions so you remember to ask them during your visits  © Copyright [employer] 2022 Information is for End User's use only and may not be sold, redistributed or otherwise used for commercial purposes  All illustrations and images included in CareNotes® are the copyrighted property of [employer]  or [facility] Charlene Jeffries   The above information is an  only  It is not intended as medical advice for individual conditions or treatments  Talk to your doctor, nurse or pharmacist before following any medical regimen to see if it is safe and effective for you  Follow up with PCP in 3-5 days  Proceed to  ER if symptoms worsen  Chief Complaint     Chief Complaint   Patient presents with    Puncture Wound     Stepped on a dirty nail 1 day ago on right foot         History of Present Illness       Patient complains of puncture wound to plantar forefoot yesterday with nail that went through the sole of her work shoe  She denies foreign body sensation  She is up-to-date on tetanus  Review of Systems   Review of Systems   Constitutional: Negative for activity change, chills and fever  Musculoskeletal: Negative for arthralgias, back pain, joint swelling, myalgias, neck pain and neck stiffness  Skin: Positive for wound  Negative for color change  Neurological: Negative for dizziness and headaches  Current Medications       Current Outpatient Medications:     albuterol (PROAIR HFA) 90 mcg/act inhaler, Inhale 2 puffs every 6 (six) hours as needed for wheezing, Disp: 8 5 g, Rfl: 0    cephalexin (KEFLEX) 500 mg capsule, Take 1 capsule (500 mg total) by mouth every 8 (eight) hours for 5 days, Disp: 15 capsule, Rfl: 0    diphenhydrAMINE (BENADRYL) 50 mg capsule, Take 50 mg by mouth every 6 (six) hours as needed for itching, Disp: , Rfl:     meclizine (ANTIVERT) 25 mg tablet, Take 1 tablet (25 mg total) by mouth every 8 (eight) hours as needed for dizziness, Disp: 30 tablet, Rfl: 0    Multiple Vitamin (MULTIVITAMIN) capsule, Take 1 capsule by mouth daily, Disp: , Rfl:     aspirin 81 mg chewable tablet, Chew 81 mg daily (Patient not taking: Reported on 2022), Disp: , Rfl:     Aspirin Buf,CaCarb-MgCarb-MgO, 81 MG TABS, Take by mouth (Patient not taking: Reported on 2022), Disp: , Rfl:     predniSONE 10 mg tablet, Take once daily all days pills on this schedule 6- 6- 5- 4- 3- 2- 1, Disp: 27 tablet, Rfl: 0    ranitidine (ZANTAC) 150 MG capsule, Take 150 mg by mouth as needed for indigestion or heartburn, Disp: , Rfl:     Current Allergies     Allergies as of 2022 - Reviewed 2022   Allergen Reaction Noted    Shellfish-derived products - food allergy Anaphylaxis 06/10/2019    Azithromycin Rash 10/10/2012            The following portions of the patient's history were reviewed and updated as appropriate: allergies, current medications, past family history, past medical history, past social history, past surgical history and problem list      Past Medical History:   Diagnosis Date    Allergic     Hypoglycemia        Past Surgical History:   Procedure Laterality Date    ADENOIDECTOMY       SECTION      ECTOPIC PREGNANCY SURGERY      SEPTOPLASTY      TONSILLECTOMY         Family History   Problem Relation Age of Onset    Depression Mother     Heart disease Father Medications have been verified  Objective   /67   Pulse 78   Temp 98 °F (36 7 °C)   Resp 16   Ht 5' 8" (1 727 m)   Wt 83 9 kg (185 lb)   SpO2 99%   BMI 28 13 kg/m²        Physical Exam     Physical Exam  Vitals and nursing note reviewed  Constitutional:       Appearance: She is well-developed  HENT:      Head: Normocephalic and atraumatic  Musculoskeletal:         General: No tenderness  Cervical back: Normal range of motion and neck supple  Skin:     General: Skin is warm and dry  Findings: No erythema or rash  Comments: Puncture wound right plantar foot between 1st and 2nd metatarsal heads without visible palpable foreign body   Neurological:      Mental Status: She is alert and oriented to person, place, and time  Psychiatric:         Mood and Affect: Mood normal          Behavior: Behavior normal          Thought Content:  Thought content normal          Judgment: Judgment normal

## 2024-03-22 ENCOUNTER — HOSPITAL ENCOUNTER (INPATIENT)
Facility: HOSPITAL | Age: 64
LOS: 6 days | Discharge: HOME | DRG: 222 | End: 2024-03-28
Attending: INTERNAL MEDICINE | Admitting: INTERNAL MEDICINE
Payer: COMMERCIAL

## 2024-03-22 ENCOUNTER — APPOINTMENT (EMERGENCY)
Dept: RADIOLOGY | Facility: HOSPITAL | Age: 64
End: 2024-03-22
Payer: COMMERCIAL

## 2024-03-22 ENCOUNTER — HOSPITAL ENCOUNTER (EMERGENCY)
Facility: HOSPITAL | Age: 64
End: 2024-03-22
Attending: EMERGENCY MEDICINE
Payer: COMMERCIAL

## 2024-03-22 ENCOUNTER — APPOINTMENT (INPATIENT)
Dept: RADIOLOGY | Facility: HOSPITAL | Age: 64
DRG: 222 | End: 2024-03-22
Payer: COMMERCIAL

## 2024-03-22 ENCOUNTER — APPOINTMENT (INPATIENT)
Dept: NON INVASIVE DIAGNOSTICS | Facility: HOSPITAL | Age: 64
DRG: 222 | End: 2024-03-22
Payer: COMMERCIAL

## 2024-03-22 VITALS
SYSTOLIC BLOOD PRESSURE: 146 MMHG | WEIGHT: 227.07 LBS | RESPIRATION RATE: 18 BRPM | OXYGEN SATURATION: 98 % | HEART RATE: 138 BPM | DIASTOLIC BLOOD PRESSURE: 98 MMHG | BODY MASS INDEX: 34.53 KG/M2

## 2024-03-22 DIAGNOSIS — G89.29 CHRONIC LOW BACK PAIN: ICD-10-CM

## 2024-03-22 DIAGNOSIS — I47.20 V-TACH (HCC): Primary | ICD-10-CM

## 2024-03-22 DIAGNOSIS — I21.3 STEMI (ST ELEVATION MYOCARDIAL INFARCTION) (HCC): Primary | ICD-10-CM

## 2024-03-22 DIAGNOSIS — I21.3 STEMI (ST ELEVATION MYOCARDIAL INFARCTION) (HCC): ICD-10-CM

## 2024-03-22 DIAGNOSIS — I48.92 ATRIAL FLUTTER, UNSPECIFIED TYPE (HCC): ICD-10-CM

## 2024-03-22 DIAGNOSIS — Z95.810 S/P ICD (INTERNAL CARDIAC DEFIBRILLATOR) PROCEDURE: ICD-10-CM

## 2024-03-22 DIAGNOSIS — I21.02 ST ELEVATION MYOCARDIAL INFARCTION INVOLVING LEFT ANTERIOR DESCENDING (LAD) CORONARY ARTERY (HCC): ICD-10-CM

## 2024-03-22 DIAGNOSIS — I25.5 ISCHEMIC CARDIOMYOPATHY: ICD-10-CM

## 2024-03-22 DIAGNOSIS — Z72.0 TOBACCO ABUSE: ICD-10-CM

## 2024-03-22 DIAGNOSIS — I47.20 VT (VENTRICULAR TACHYCARDIA) (HCC): ICD-10-CM

## 2024-03-22 DIAGNOSIS — E78.5 HYPERLIPIDEMIA, UNSPECIFIED HYPERLIPIDEMIA TYPE: ICD-10-CM

## 2024-03-22 DIAGNOSIS — I50.20 HFREF (HEART FAILURE WITH REDUCED EJECTION FRACTION) (HCC): ICD-10-CM

## 2024-03-22 DIAGNOSIS — M54.50 CHRONIC LOW BACK PAIN: ICD-10-CM

## 2024-03-22 PROBLEM — R06.89 ACUTE RESPIRATORY INSUFFICIENCY: Status: ACTIVE | Noted: 2024-03-22

## 2024-03-22 PROBLEM — E87.20 LACTIC ACIDOSIS: Status: ACTIVE | Noted: 2024-03-22

## 2024-03-22 PROBLEM — E11.9 NEW ONSET TYPE 2 DIABETES MELLITUS (HCC): Status: ACTIVE | Noted: 2024-03-22

## 2024-03-22 LAB
ALBUMIN SERPL BCP-MCNC: 4.4 G/DL (ref 3.5–5)
ALP SERPL-CCNC: 49 U/L (ref 34–104)
ALT SERPL W P-5'-P-CCNC: 56 U/L (ref 7–52)
ANION GAP SERPL CALCULATED.3IONS-SCNC: 12 MMOL/L (ref 4–13)
AST SERPL W P-5'-P-CCNC: 55 U/L (ref 13–39)
BASOPHILS # BLD AUTO: 0.03 THOUSANDS/ÂΜL (ref 0–0.1)
BASOPHILS NFR BLD AUTO: 0 % (ref 0–1)
BILIRUB SERPL-MCNC: 0.64 MG/DL (ref 0.2–1)
BNP SERPL-MCNC: 447 PG/ML (ref 0–100)
BUN SERPL-MCNC: 14 MG/DL (ref 5–25)
CALCIUM SERPL-MCNC: 9.7 MG/DL (ref 8.4–10.2)
CARDIAC TROPONIN I PNL SERPL HS: 20 NG/L
CARDIAC TROPONIN I PNL SERPL HS: 930 NG/L
CHLORIDE SERPL-SCNC: 103 MMOL/L (ref 96–108)
CHOLEST SERPL-MCNC: 323 MG/DL
CO2 SERPL-SCNC: 19 MMOL/L (ref 21–32)
CREAT SERPL-MCNC: 0.78 MG/DL (ref 0.6–1.3)
EOSINOPHIL # BLD AUTO: 0.01 THOUSAND/ÂΜL (ref 0–0.61)
EOSINOPHIL NFR BLD AUTO: 0 % (ref 0–6)
ERYTHROCYTE [DISTWIDTH] IN BLOOD BY AUTOMATED COUNT: 14.1 % (ref 11.6–15.1)
EST. AVERAGE GLUCOSE BLD GHB EST-MCNC: 180 MG/DL
GFR SERPL CREATININE-BSD FRML MDRD: 80 ML/MIN/1.73SQ M
GLUCOSE SERPL-MCNC: 355 MG/DL (ref 65–140)
HBA1C MFR BLD: 7.9 %
HCT VFR BLD AUTO: 48.3 % (ref 34.8–46.1)
HDLC SERPL-MCNC: 50 MG/DL
HGB BLD-MCNC: 16 G/DL (ref 11.5–15.4)
IMM GRANULOCYTES # BLD AUTO: 0.03 THOUSAND/UL (ref 0–0.2)
IMM GRANULOCYTES NFR BLD AUTO: 0 % (ref 0–2)
LACTATE SERPL-SCNC: 2.6 MMOL/L (ref 0.5–2)
LACTATE SERPL-SCNC: 3 MMOL/L (ref 0.5–2)
LDLC SERPL CALC-MCNC: 236 MG/DL (ref 0–100)
LYMPHOCYTES # BLD AUTO: 1.19 THOUSANDS/ÂΜL (ref 0.6–4.47)
LYMPHOCYTES NFR BLD AUTO: 13 % (ref 14–44)
MAGNESIUM SERPL-MCNC: 2.9 MG/DL (ref 1.9–2.7)
MCH RBC QN AUTO: 29.9 PG (ref 26.8–34.3)
MCHC RBC AUTO-ENTMCNC: 33.1 G/DL (ref 31.4–37.4)
MCV RBC AUTO: 90 FL (ref 82–98)
MONOCYTES # BLD AUTO: 0.06 THOUSAND/ÂΜL (ref 0.17–1.22)
MONOCYTES NFR BLD AUTO: 1 % (ref 4–12)
NEUTROPHILS # BLD AUTO: 7.62 THOUSANDS/ÂΜL (ref 1.85–7.62)
NEUTS SEG NFR BLD AUTO: 86 % (ref 43–75)
NONHDLC SERPL-MCNC: 273 MG/DL
NRBC BLD AUTO-RTO: 0 /100 WBCS
PLATELET # BLD AUTO: 306 THOUSANDS/UL (ref 149–390)
PMV BLD AUTO: 10.2 FL (ref 8.9–12.7)
POTASSIUM SERPL-SCNC: 4.1 MMOL/L (ref 3.5–5.3)
PROT SERPL-MCNC: 7.1 G/DL (ref 6.4–8.4)
RBC # BLD AUTO: 5.35 MILLION/UL (ref 3.81–5.12)
SODIUM SERPL-SCNC: 134 MMOL/L (ref 135–147)
TRIGL SERPL-MCNC: 185 MG/DL
WBC # BLD AUTO: 8.94 THOUSAND/UL (ref 4.31–10.16)

## 2024-03-22 PROCEDURE — B2111ZZ FLUOROSCOPY OF MULTIPLE CORONARY ARTERIES USING LOW OSMOLAR CONTRAST: ICD-10-PCS | Performed by: INTERNAL MEDICINE

## 2024-03-22 PROCEDURE — 93005 ELECTROCARDIOGRAM TRACING: CPT

## 2024-03-22 PROCEDURE — 71045 X-RAY EXAM CHEST 1 VIEW: CPT

## 2024-03-22 PROCEDURE — 4A023N7 MEASUREMENT OF CARDIAC SAMPLING AND PRESSURE, LEFT HEART, PERCUTANEOUS APPROACH: ICD-10-PCS | Performed by: INTERNAL MEDICINE

## 2024-03-22 PROCEDURE — C1769 GUIDE WIRE: HCPCS | Performed by: INTERNAL MEDICINE

## 2024-03-22 PROCEDURE — C1874 STENT, COATED/COV W/DEL SYS: HCPCS | Performed by: INTERNAL MEDICINE

## 2024-03-22 PROCEDURE — 92960 CARDIOVERSION ELECTRIC EXT: CPT | Performed by: EMERGENCY MEDICINE

## 2024-03-22 PROCEDURE — C1887 CATHETER, GUIDING: HCPCS | Performed by: INTERNAL MEDICINE

## 2024-03-22 PROCEDURE — 85025 COMPLETE CBC W/AUTO DIFF WBC: CPT | Performed by: EMERGENCY MEDICINE

## 2024-03-22 PROCEDURE — 84484 ASSAY OF TROPONIN QUANT: CPT | Performed by: EMERGENCY MEDICINE

## 2024-03-22 PROCEDURE — C1725 CATH, TRANSLUMIN NON-LASER: HCPCS | Performed by: INTERNAL MEDICINE

## 2024-03-22 PROCEDURE — 92941 PRQ TRLML REVSC TOT OCCL AMI: CPT | Performed by: INTERNAL MEDICINE

## 2024-03-22 PROCEDURE — C1894 INTRO/SHEATH, NON-LASER: HCPCS | Performed by: INTERNAL MEDICINE

## 2024-03-22 PROCEDURE — 96360 HYDRATION IV INFUSION INIT: CPT

## 2024-03-22 PROCEDURE — 027034Z DILATION OF CORONARY ARTERY, ONE ARTERY WITH DRUG-ELUTING INTRALUMINAL DEVICE, PERCUTANEOUS APPROACH: ICD-10-PCS | Performed by: INTERNAL MEDICINE

## 2024-03-22 PROCEDURE — 99152 MOD SED SAME PHYS/QHP 5/>YRS: CPT | Performed by: INTERNAL MEDICINE

## 2024-03-22 PROCEDURE — 36415 COLL VENOUS BLD VENIPUNCTURE: CPT | Performed by: EMERGENCY MEDICINE

## 2024-03-22 PROCEDURE — C1760 CLOSURE DEV, VASC: HCPCS | Performed by: INTERNAL MEDICINE

## 2024-03-22 PROCEDURE — 92978 ENDOLUMINL IVUS OCT C 1ST: CPT | Performed by: INTERNAL MEDICINE

## 2024-03-22 PROCEDURE — NC001 PR NO CHARGE: Performed by: INTERNAL MEDICINE

## 2024-03-22 PROCEDURE — C9607 PERC D-E COR REVASC CHRO SIN: HCPCS | Performed by: INTERNAL MEDICINE

## 2024-03-22 PROCEDURE — 93458 L HRT ARTERY/VENTRICLE ANGIO: CPT | Performed by: INTERNAL MEDICINE

## 2024-03-22 PROCEDURE — 80061 LIPID PANEL: CPT | Performed by: INTERNAL MEDICINE

## 2024-03-22 PROCEDURE — 99285 EMERGENCY DEPT VISIT HI MDM: CPT

## 2024-03-22 PROCEDURE — 99291 CRITICAL CARE FIRST HOUR: CPT | Performed by: EMERGENCY MEDICINE

## 2024-03-22 PROCEDURE — C1753 CATH, INTRAVAS ULTRASOUND: HCPCS | Performed by: INTERNAL MEDICINE

## 2024-03-22 PROCEDURE — 99153 MOD SED SAME PHYS/QHP EA: CPT | Performed by: INTERNAL MEDICINE

## 2024-03-22 PROCEDURE — 83880 ASSAY OF NATRIURETIC PEPTIDE: CPT | Performed by: EMERGENCY MEDICINE

## 2024-03-22 PROCEDURE — 92960 CARDIOVERSION ELECTRIC EXT: CPT

## 2024-03-22 PROCEDURE — 96375 TX/PRO/DX INJ NEW DRUG ADDON: CPT

## 2024-03-22 PROCEDURE — 83036 HEMOGLOBIN GLYCOSYLATED A1C: CPT | Performed by: NURSE PRACTITIONER

## 2024-03-22 PROCEDURE — 83605 ASSAY OF LACTIC ACID: CPT | Performed by: NURSE PRACTITIONER

## 2024-03-22 PROCEDURE — 84484 ASSAY OF TROPONIN QUANT: CPT | Performed by: INTERNAL MEDICINE

## 2024-03-22 PROCEDURE — 96374 THER/PROPH/DIAG INJ IV PUSH: CPT

## 2024-03-22 PROCEDURE — 83605 ASSAY OF LACTIC ACID: CPT | Performed by: EMERGENCY MEDICINE

## 2024-03-22 PROCEDURE — 80053 COMPREHEN METABOLIC PANEL: CPT | Performed by: EMERGENCY MEDICINE

## 2024-03-22 PROCEDURE — 85347 COAGULATION TIME ACTIVATED: CPT

## 2024-03-22 PROCEDURE — 83735 ASSAY OF MAGNESIUM: CPT | Performed by: EMERGENCY MEDICINE

## 2024-03-22 DEVICE — PERCLOSE™ PROSTYLE™ SUTURE-MEDIATED CLOSURE AND REPAIR SYSTEM
Type: IMPLANTABLE DEVICE | Site: GROIN | Status: FUNCTIONAL
Brand: PERCLOSE™ PROSTYLE™

## 2024-03-22 DEVICE — EVEROLIMUS-ELUTING PLATINUM CHROMIUM CORONARY STENT SYSTEM
Type: IMPLANTABLE DEVICE | Site: CORONARY | Status: FUNCTIONAL
Brand: SYNERGY™ XD

## 2024-03-22 RX ORDER — HEPARIN SODIUM 1000 [USP'U]/ML
INJECTION, SOLUTION INTRAVENOUS; SUBCUTANEOUS CODE/TRAUMA/SEDATION MEDICATION
Status: DISCONTINUED | OUTPATIENT
Start: 2024-03-22 | End: 2024-03-22 | Stop reason: HOSPADM

## 2024-03-22 RX ORDER — VERAPAMIL HCL 2.5 MG/ML
AMPUL (ML) INTRAVENOUS CODE/TRAUMA/SEDATION MEDICATION
Status: DISCONTINUED | OUTPATIENT
Start: 2024-03-22 | End: 2024-03-22 | Stop reason: HOSPADM

## 2024-03-22 RX ORDER — INSULIN LISPRO 100 [IU]/ML
1-5 INJECTION, SOLUTION INTRAVENOUS; SUBCUTANEOUS
Status: DISCONTINUED | OUTPATIENT
Start: 2024-03-22 | End: 2024-03-28 | Stop reason: HOSPADM

## 2024-03-22 RX ORDER — METOPROLOL TARTRATE 1 MG/ML
2.5 INJECTION, SOLUTION INTRAVENOUS ONCE
Status: DISCONTINUED | OUTPATIENT
Start: 2024-03-22 | End: 2024-03-22 | Stop reason: HOSPADM

## 2024-03-22 RX ORDER — ADENOSINE 3 MG/ML
INJECTION INTRAVENOUS CODE/TRAUMA/SEDATION MEDICATION
Status: DISCONTINUED | OUTPATIENT
Start: 2024-03-22 | End: 2024-03-22 | Stop reason: HOSPADM

## 2024-03-22 RX ORDER — ASPIRIN 81 MG/1
324 TABLET, CHEWABLE ORAL ONCE
Status: COMPLETED | OUTPATIENT
Start: 2024-03-22 | End: 2024-03-22

## 2024-03-22 RX ORDER — HEPARIN SODIUM 1000 [USP'U]/ML
4000 INJECTION, SOLUTION INTRAVENOUS; SUBCUTANEOUS ONCE
Status: COMPLETED | OUTPATIENT
Start: 2024-03-22 | End: 2024-03-22

## 2024-03-22 RX ORDER — MAGNESIUM SULFATE 500 MG/ML
VIAL (ML) INJECTION CODE/TRAUMA/SEDATION MEDICATION
Status: COMPLETED | OUTPATIENT
Start: 2024-03-22 | End: 2024-03-22

## 2024-03-22 RX ORDER — NITROGLYCERIN 20 MG/100ML
INJECTION INTRAVENOUS CODE/TRAUMA/SEDATION MEDICATION
Status: DISCONTINUED | OUTPATIENT
Start: 2024-03-22 | End: 2024-03-22 | Stop reason: HOSPADM

## 2024-03-22 RX ORDER — HEPARIN SODIUM 5000 [USP'U]/ML
5000 INJECTION, SOLUTION INTRAVENOUS; SUBCUTANEOUS EVERY 8 HOURS SCHEDULED
Status: DISCONTINUED | OUTPATIENT
Start: 2024-03-23 | End: 2024-03-25

## 2024-03-22 RX ORDER — MIDAZOLAM HYDROCHLORIDE 2 MG/2ML
INJECTION, SOLUTION INTRAMUSCULAR; INTRAVENOUS CODE/TRAUMA/SEDATION MEDICATION
Status: DISCONTINUED | OUTPATIENT
Start: 2024-03-22 | End: 2024-03-22 | Stop reason: HOSPADM

## 2024-03-22 RX ORDER — ACETAMINOPHEN 325 MG/1
650 TABLET ORAL EVERY 4 HOURS PRN
Status: DISCONTINUED | OUTPATIENT
Start: 2024-03-22 | End: 2024-03-28 | Stop reason: HOSPADM

## 2024-03-22 RX ORDER — METOPROLOL TARTRATE 1 MG/ML
INJECTION, SOLUTION INTRAVENOUS CODE/TRAUMA/SEDATION MEDICATION
Status: DISCONTINUED | OUTPATIENT
Start: 2024-03-22 | End: 2024-03-22 | Stop reason: HOSPADM

## 2024-03-22 RX ORDER — LIDOCAINE HYDROCHLORIDE 10 MG/ML
INJECTION, SOLUTION EPIDURAL; INFILTRATION; INTRACAUDAL; PERINEURAL CODE/TRAUMA/SEDATION MEDICATION
Status: DISCONTINUED | OUTPATIENT
Start: 2024-03-22 | End: 2024-03-22 | Stop reason: HOSPADM

## 2024-03-22 RX ORDER — FUROSEMIDE 10 MG/ML
INJECTION INTRAMUSCULAR; INTRAVENOUS CODE/TRAUMA/SEDATION MEDICATION
Status: DISCONTINUED | OUTPATIENT
Start: 2024-03-22 | End: 2024-03-22 | Stop reason: HOSPADM

## 2024-03-22 RX ORDER — INSULIN LISPRO 100 [IU]/ML
1-6 INJECTION, SOLUTION INTRAVENOUS; SUBCUTANEOUS
Status: DISCONTINUED | OUTPATIENT
Start: 2024-03-23 | End: 2024-03-28 | Stop reason: HOSPADM

## 2024-03-22 RX ORDER — SODIUM CHLORIDE 9 MG/ML
50 INJECTION, SOLUTION INTRAVENOUS CONTINUOUS
Status: DISCONTINUED | OUTPATIENT
Start: 2024-03-22 | End: 2024-03-23

## 2024-03-22 RX ORDER — ADENOSINE 3 MG/ML
3 INJECTION, SOLUTION INTRAVENOUS ONCE
Status: COMPLETED | OUTPATIENT
Start: 2024-03-22 | End: 2024-03-22

## 2024-03-22 RX ORDER — AMIODARONE HYDROCHLORIDE 150 MG/3ML
INJECTION, SOLUTION INTRAVENOUS CODE/TRAUMA/SEDATION MEDICATION
Status: COMPLETED | OUTPATIENT
Start: 2024-03-22 | End: 2024-03-22

## 2024-03-22 RX ORDER — ATORVASTATIN CALCIUM 80 MG/1
80 TABLET, FILM COATED ORAL EVERY EVENING
Status: DISCONTINUED | OUTPATIENT
Start: 2024-03-22 | End: 2024-03-28 | Stop reason: HOSPADM

## 2024-03-22 RX ORDER — ASPIRIN 81 MG/1
81 TABLET, CHEWABLE ORAL DAILY
Status: DISCONTINUED | OUTPATIENT
Start: 2024-03-23 | End: 2024-03-28 | Stop reason: HOSPADM

## 2024-03-22 RX ORDER — FUROSEMIDE 40 MG/1
40 TABLET ORAL
Status: DISCONTINUED | OUTPATIENT
Start: 2024-03-23 | End: 2024-03-23

## 2024-03-22 RX ORDER — ETOMIDATE 2 MG/ML
15 INJECTION INTRAVENOUS ONCE
Status: COMPLETED | OUTPATIENT
Start: 2024-03-22 | End: 2024-03-22

## 2024-03-22 RX ORDER — HEPARIN SODIUM 10000 [USP'U]/100ML
3-20 INJECTION, SOLUTION INTRAVENOUS
Status: DISCONTINUED | OUTPATIENT
Start: 2024-03-22 | End: 2024-03-22 | Stop reason: HOSPADM

## 2024-03-22 RX ORDER — AMIODARONE HYDROCHLORIDE 150 MG/3ML
INJECTION, SOLUTION INTRAVENOUS CODE/TRAUMA/SEDATION MEDICATION
Status: DISCONTINUED | OUTPATIENT
Start: 2024-03-22 | End: 2024-03-22 | Stop reason: HOSPADM

## 2024-03-22 RX ORDER — METOPROLOL SUCCINATE 25 MG/1
25 TABLET, EXTENDED RELEASE ORAL DAILY
Status: DISCONTINUED | OUTPATIENT
Start: 2024-03-23 | End: 2024-03-23

## 2024-03-22 RX ORDER — METOPROLOL TARTRATE 1 MG/ML
2.5 INJECTION, SOLUTION INTRAVENOUS ONCE
Status: COMPLETED | OUTPATIENT
Start: 2024-03-22 | End: 2024-03-22

## 2024-03-22 RX ORDER — FENTANYL CITRATE 50 UG/ML
INJECTION, SOLUTION INTRAMUSCULAR; INTRAVENOUS CODE/TRAUMA/SEDATION MEDICATION
Status: DISCONTINUED | OUTPATIENT
Start: 2024-03-22 | End: 2024-03-22 | Stop reason: HOSPADM

## 2024-03-22 RX ADMIN — SODIUM CHLORIDE 50 ML/HR: 0.9 INJECTION, SOLUTION INTRAVENOUS at 21:48

## 2024-03-22 RX ADMIN — ATORVASTATIN CALCIUM 80 MG: 80 TABLET, FILM COATED ORAL at 22:31

## 2024-03-22 RX ADMIN — TICAGRELOR 180 MG: 90 TABLET ORAL at 18:48

## 2024-03-22 RX ADMIN — AMIODARONE HYDROCHLORIDE 150 MG: 50 INJECTION, SOLUTION INTRAVENOUS at 18:03

## 2024-03-22 RX ADMIN — ETOMIDATE 15 MG: 2 INJECTION INTRAVENOUS at 18:11

## 2024-03-22 RX ADMIN — HEPARIN SODIUM 4000 UNITS: 1000 INJECTION INTRAVENOUS; SUBCUTANEOUS at 18:48

## 2024-03-22 RX ADMIN — SODIUM CHLORIDE 1000 ML: 0.9 INJECTION, SOLUTION INTRAVENOUS at 18:10

## 2024-03-22 RX ADMIN — TICAGRELOR 90 MG: 90 TABLET ORAL at 22:31

## 2024-03-22 RX ADMIN — METOPROLOL TARTRATE 2.5 MG: 5 INJECTION INTRAVENOUS at 18:34

## 2024-03-22 RX ADMIN — AMIODARONE HYDROCHLORIDE 1 MG/MIN: 50 INJECTION, SOLUTION INTRAVENOUS at 20:26

## 2024-03-22 RX ADMIN — MAGNESIUM SULFATE 2 G: 500 INJECTION, SOLUTION INTRAMUSCULAR; INTRAVENOUS at 18:02

## 2024-03-22 RX ADMIN — ASPIRIN 81 MG 324 MG: 81 TABLET ORAL at 18:48

## 2024-03-22 NOTE — Clinical Note
Prepped: left chest. Prepped with: ChloraPrep. The site was clipped. The patient was draped. Prepped x2

## 2024-03-22 NOTE — H&P
STEMI H&P - Cardiology  Vesna Langston 63 y.o. female MRN: [id number]  Unit/Bed#:  Encounter: [id number]        PCP: Alberta Paige DO       History of Present Illness   Physician Requesting Consult: No att. providers found  Reason for Consult / Principal Problem: STEMI    HPI: @Vesna Langston is a 63 y.o. female with no know PMH who presents to [facility] after episodes of syncope and chest pressure. While in ED patient was found to be in a-flutter and went into VT s/p shock EKG after showed lateral STEMI. Patient was life flighted to [facility].     Patient states that she received an SI joint injection today and after going home she was cleaning up and had a syncopal episode without prodrome. She was transported to the ED via EMS.        Pertinent labs:  Hs-trop 0HR : 20  Cr 0.78, K 4.1  Hgb 16, Plt 306    Intial EKG      Subsequent EKG:                     Review of Systems   Constitutional:  Negative for diaphoresis, fatigue and fever.   Respiratory:  Negative for chest tightness, shortness of breath and wheezing.    Cardiovascular:  Negative for chest pain, palpitations and leg swelling.   Gastrointestinal:  Negative for abdominal pain, nausea and vomiting.     Review of system was conducted and was negative except for as stated in the HPI.      Historical Information   Past Medical History:   Diagnosis Date    Allergic     Hypoglycemia      Past Surgical History:   Procedure Laterality Date    ADENOIDECTOMY       SECTION      ECTOPIC PREGNANCY SURGERY      SEPTOPLASTY      TONSILLECTOMY       Social History     Substance and Sexual Activity   Alcohol Use Yes     Social History     Substance and Sexual Activity   Drug Use Not Currently     Social History     Tobacco Use   Smoking Status Every Day    Current packs/day: 1.00    Types: Cigarettes   Smokeless Tobacco Never     Family History: non-contributory    Meds/Allergies   Hospital Medications:   No current facility-administered medications for this encounter.      Facility-Administered Medications Ordered in Other Encounters   Medication Dose Route Frequency    heparin (porcine) 25,000 units in 0.45% NaCl 250 mL infusion (premix)  3-20 Units/kg/hr (Order-Specific) Intravenous Titrated    metoprolol (LOPRESSOR) injection 2.5 mg  2.5 mg Intravenous Once    [START ON 3/23/2024] ticagrelor (BRILINTA) tablet 90 mg  90 mg Oral Q12H MONA     Home Medications:   No medications prior to admission.       Allergies   Allergen Reactions    Shellfish-Derived Products - Food Allergy Anaphylaxis    Azithromycin Rash       Objective   Vitals: There were no vitals taken for this visit.    No intake or output data in the 24 hours ending 03/22/24 1914    Physical Exam  Constitutional:       General: She is not in acute distress.     Appearance: Normal appearance. She is not ill-appearing.   Cardiovascular:      Rate and Rhythm: Regular rhythm. Tachycardia present.      Pulses: Normal pulses.   Pulmonary:      Effort: Pulmonary effort is normal. No respiratory distress.   Musculoskeletal:         General: No swelling.   Skin:     General: Skin is warm.      Capillary Refill: Capillary refill takes less than 2 seconds.   Neurological:      General: No focal deficit present.      Mental Status: She is oriented to person, place, and time.             Lab Results: I have personally reviewed pertinent lab results.    Results from last 7 days   Lab Units 03/22/24  1822   HS TNI 0HR ng/L 20         Results from last 7 days   Lab Units 03/22/24  1822   POTASSIUM mmol/L 4.1   CO2 mmol/L 19*   CHLORIDE mmol/L 103   BUN mg/dL 14   CREATININE mg/dL 0.78     Results from last 7 days   Lab Units 03/22/24  1822   HEMOGLOBIN g/dL 16.0*   HEMATOCRIT % 48.3*   PLATELETS Thousands/uL 306             Imaging: I have personally reviewed pertinent reports.              [facility] [place]: Atypical flutter    Code Status: Full      VTE Prophylaxis: Heparin       Assessment/Plan     Assessment:  63 y.o. female  patient who presented to [facility] after syncopal event. Had VT s/p shock, subsequent EKG showed a STEMI.      STEMI. S/p PCI to pLAD. Patient had a very complex lesion. LVEDP was was elevated at 35  Ventricular Tachycardia. Likely 2/2 ischemia. Patient was give an amiodarone bolus at [facility], another bolus given in the cath lab when the patient was in NSR.   Atrial flutter. Noted be in 2:1 atrial flutter, converted to NSR during C.   Elevated fasting blood glucose.         Plan:  LHC +\-PCI  Continue amiodarone for now  DAPT with ASA and brilinta  Start statin   A1c and lipid panel pending   TTE pending  Start Lasix 40 mg BID          Gm Oliver MD  FY-1    ==========================================================================================        Epic/ Allscripts/Care Everywhere records reviewed: Yes    ** Please Note: Fluency DirectDictation voice to text software may have been used in the creation of this document. **

## 2024-03-22 NOTE — EMTALA/ACUTE CARE TRANSFER
[facility] EMERGENCY DEPARTMENT  [address] [address]  Dept: [phone]      EMTALA TRANSFER CONSENT    NAME Vesna MEDRANO 1960                              MRN [id number]    I have been informed of my rights regarding examination, treatment, and transfer   by Dr. Alexx Allen MD    Benefits: Specialized equipment and/or services available at the receiving facility (Include comment)________________________    Risks: Potential for delay in receiving treatment, Potential deterioration of medical condition, Loss of IV, Increased discomfort during transfer, Possible worsening of condition or death during transfer      Consent for Transfer:  I acknowledge that my medical condition has been evaluated and explained to me by the emergency department physician or other qualified medical person and/or my attending physician, who has recommended that I be transferred to the service of  Accepting Physician: Dr Louis orta  . The above potential benefits of such transfer, the potential risks associated with such transfer, and the probable risks of not being transferred have been explained to me, and I fully understand them.  The doctor has explained that, in my case, the benefits of transfer outweigh the risks.  I agree to be transferred.    I authorize the performance of emergency medical procedures and treatments upon me in both transit and upon arrival at the receiving facility.  Additionally, I authorize the release of any and all medical records to the receiving facility and request they be transported with me, if possible.  I understand that the safest mode of transportation during a medical emergency is an ambulance and that the Hospital advocates the use of this mode of transport. Risks of traveling to the receiving facility by car, including absence of medical control, life sustaining equipment, such as oxygen, and medical personnel has  been explained to me and I fully understand them.    (MAKSIM CORRECT BOX BELOW)  [X]  I consent to the stated transfer and to be transported by ambulance/helicopter.  [  ]  I consent to the stated transfer, but refuse transportation by ambulance and accept full responsibility for my transportation by car.  I understand the risks of non-ambulance transfers and I exonerate the Hospital and its staff from any deterioration in my condition that results from this refusal.    X___________________________________________    DATE  24  TIME________  Signature of patient or legally responsible individual signing on patient behalf           RELATIONSHIP TO PATIENT_________________________          Provider Certification    NAME Vesna Langston                                         1960                              MRN [id number]    A medical screening exam was performed on the above named patient.  Based on the examination:    Condition Necessitating Transfer There were no encounter diagnoses.    Patient Condition: The patient has been stabilized such that within reasonable medical probability, no material deterioration of the patient condition or the condition of the unborn child(gus) is likely to result from the transfer    Reason for Transfer: Level of Care needed not available at this facility    Transfer Requirements: Facility     Space available and qualified personnel available for treatment as acknowledged by    Agreed to accept transfer and to provide appropriate medical treatment as acknowledged by       Dr Myers  Appropriate medical records of the examination and treatment of the patient are provided at the time of transfer   STAFF INITIAL WHEN COMPLETED _______  Transfer will be performed by qualified personnel from    and appropriate transfer equipment as required, including the use of necessary and appropriate life support measures.    Provider Certification: I have examined the patient and explained the  following risks and benefits of being transferred/refusing transfer to the patient/family:  General risk, such as traffic hazards, adverse weather conditions, rough terrain or turbulence, possible failure of equipment (including vehicle or aircraft), or consequences of actions of persons outside the control of the transport personnel, Unanticipated needs of medical equipment and personnel during transport, Risk of worsening condition, The possibility of a transport vehicle being unavailable, Consent was not obtained as patient is committed to psychiatric facility and transfer is mandated      Based on these reasonable risks and benefits to the patient and/or the unborn child(gus), and based upon the information available at the time of the patient’s examination, I certify that the medical benefits reasonably to be expected from the provision of appropriate medical treatments at another medical facility outweigh the increasing risks, if any, to the individual’s medical condition, and in the case of labor to the unborn child, from effecting the transfer.    X____________________________________________ DATE 03/22/24        TIME_______      ORIGINAL - SEND TO MEDICAL RECORDS   COPY - SEND WITH PATIENT DURING TRANSFER

## 2024-03-22 NOTE — ED PROVIDER NOTES
History  Chief Complaint   Patient presents with    Syncope     Pt had a back procedure done today. Once home she had 3 episodes of syncope. When EMS arrived pt was weak and shakey and noted to be in SVT. Pt in Vtach on arrival     Patient earlier today had SI joint injection.  Later at home she suffered 3 episodes of syncope, chest pressure.  EMS was called to bring patient to the hospital.  They noted that the patient was in SVT and gave dose of adenosine.  Review of the EMS strip reveals that the underlying rhythm was a flutter.  However, by the time she had arrived in the emergency department and her rhythm was V. tach.  She complained of chest pressure and generalized malaise.      History provided by:  Patient and EMS personnel   used: No    Syncope  Episode history:  Multiple  Most recent episode:  Today  Timing:  Intermittent  Progression:  Unchanged  Chronicity:  New  Relieved by:  Nothing  Worsened by:  Nothing  Ineffective treatments:  None tried  Associated symptoms: chest pain, dizziness and weakness    Associated symptoms: no fever, no focal weakness, no headaches, no malaise/fatigue, no nausea, no palpitations, no seizures, no shortness of breath and no vomiting        Prior to Admission Medications   Prescriptions Last Dose Informant Patient Reported? Taking?   Aspirin Buf,CaCarb-MgCarb-MgO, 81 MG TABS   Yes No   Sig: Take by mouth   Patient not taking: Reported on 7/17/2022   Multiple Vitamin (MULTIVITAMIN) capsule   Yes No   Sig: Take 1 capsule by mouth daily   albuterol (PROAIR HFA) 90 mcg/act inhaler   No No   Sig: Inhale 2 puffs every 6 (six) hours as needed for wheezing   aspirin 81 mg chewable tablet   Yes No   Sig: Chew 81 mg daily   Patient not taking: Reported on 7/17/2022   diphenhydrAMINE (BENADRYL) 50 mg capsule   Yes No   Sig: Take 50 mg by mouth every 6 (six) hours as needed for itching   meclizine (ANTIVERT) 25 mg tablet   No No   Sig: Take 1 tablet (25 mg total)  by mouth every 8 (eight) hours as needed for dizziness   predniSONE 10 mg tablet   No No   Sig: Take once daily all days pills on this schedule 6- 6- 5- 4- 3- 2- 1   ranitidine (ZANTAC) 150 MG capsule   Yes No   Sig: Take 150 mg by mouth as needed for indigestion or heartburn      Facility-Administered Medications: None       Past Medical History:   Diagnosis Date    Allergic     Hypoglycemia        Past Surgical History:   Procedure Laterality Date    ADENOIDECTOMY       SECTION      ECTOPIC PREGNANCY SURGERY      SEPTOPLASTY      TONSILLECTOMY         Family History   Problem Relation Age of Onset    Depression Mother     Heart disease Father      I have reviewed and agree with the history as documented.    E-Cigarette/Vaping     E-Cigarette/Vaping Substances     Social History     Tobacco Use    Smoking status: Every Day     Current packs/day: 1.00     Types: Cigarettes    Smokeless tobacco: Never   Substance Use Topics    Alcohol use: Yes    Drug use: Not Currently       Review of Systems   Constitutional:  Negative for chills, fever and malaise/fatigue.   HENT:  Negative for ear pain, hearing loss, sore throat, trouble swallowing and voice change.    Eyes:  Negative for pain and discharge.   Respiratory:  Negative for cough, shortness of breath and wheezing.    Cardiovascular:  Positive for chest pain and syncope. Negative for palpitations.   Gastrointestinal:  Negative for abdominal pain, blood in stool, constipation, diarrhea, nausea and vomiting.   Genitourinary:  Negative for dysuria, flank pain, frequency and hematuria.   Musculoskeletal:  Negative for joint swelling, neck pain and neck stiffness.   Skin:  Negative for rash and wound.   Neurological:  Positive for dizziness and weakness. Negative for focal weakness, seizures, syncope, facial asymmetry and headaches.   Psychiatric/Behavioral:  Negative for hallucinations, self-injury and suicidal ideas.    All other systems reviewed and are  negative.      Physical Exam  Physical Exam  Vitals and nursing note reviewed.   Constitutional:       General: She is not in acute distress.     Appearance: She is well-developed. She is ill-appearing.   HENT:      Head: Normocephalic and atraumatic.      Right Ear: External ear normal.      Left Ear: External ear normal.   Eyes:      General: No scleral icterus.        Right eye: No discharge.         Left eye: No discharge.      Extraocular Movements: Extraocular movements intact.      Conjunctiva/sclera: Conjunctivae normal.   Cardiovascular:      Rate and Rhythm: Regular rhythm. Tachycardia present.      Heart sounds: Normal heart sounds. No murmur heard.  Pulmonary:      Effort: Pulmonary effort is normal.      Breath sounds: Normal breath sounds. No wheezing or rales.   Abdominal:      General: Bowel sounds are normal. There is no distension.      Palpations: Abdomen is soft.      Tenderness: There is no abdominal tenderness. There is no guarding or rebound.   Musculoskeletal:         General: No deformity. Normal range of motion.      Cervical back: Normal range of motion and neck supple.   Skin:     General: Skin is warm and dry.      Findings: No rash.   Neurological:      General: No focal deficit present.      Mental Status: She is alert and oriented to person, place, and time.      Cranial Nerves: No cranial nerve deficit.   Psychiatric:         Mood and Affect: Mood normal.         Behavior: Behavior normal.         Thought Content: Thought content normal.         Judgment: Judgment normal.         Vital Signs  ED Triage Vitals [03/22/24 1806]   Temp Pulse Respirations Blood Pressure SpO2   -- (!) 189 17 (!) 154/114 91 %      Temp src Heart Rate Source Patient Position - Orthostatic VS BP Location FiO2 (%)   -- Monitor -- Right arm --      Pain Score       5           Vitals:    03/22/24 1816 03/22/24 1824 03/22/24 1830 03/22/24 1845   BP: 123/87 126/86 138/88 146/98   Pulse: (!) 114 (!) 110 (!) 109  (!) 138         Visual Acuity      ED Medications  Medications   amiodarone 150 mg/3 mL injection (150 mg Intravenous Given 3/22/24 1803)   magnesium sulfate 1 g/2 mL injection (2 g Intravenous Given 3/22/24 1802)   sodium chloride 0.9 % bolus 1,000 mL (1,000 mL Intravenous New Bag 3/22/24 1810)   etomidate (AMIDATE) 2 mg/mL injection 15 mg (15 mg Intravenous Given 3/22/24 1811)   adenosine (FOR EMS ONLY) (ADENOCARD) 6 mg/2 mL injection 18 mg (0 mg Does not apply Given to EMS 3/22/24 1859)   ticagrelor (BRILINTA) tablet 180 mg (180 mg Oral Given 3/22/24 1848)   aspirin chewable tablet 324 mg (324 mg Oral Given 3/22/24 1848)   metoprolol (LOPRESSOR) injection 2.5 mg (2.5 mg Intravenous Given 3/22/24 1834)   heparin (porcine) injection 4,000 Units (4,000 Units Intravenous Given 3/22/24 1848)       Diagnostic Studies  Results Reviewed       Procedure Component Value Units Date/Time    B-Type Natriuretic Peptide(BNP) [[id number]]  (Abnormal) Collected: 03/22/24 1822    Lab Status: Final result Specimen: Blood from Arm, Left Updated: 03/22/24 1928      pg/mL     HS Troponin 0hr (reflex protocol) [[id number]]  (Normal) Collected: 03/22/24 1822    Lab Status: Final result Specimen: Blood from Arm, Left Updated: 03/22/24 1857     hs TnI 0hr 20 ng/L     Lactic acid, plasma (w/reflex if result > 2.0) [[id number]]  (Abnormal) Collected: 03/22/24 1822    Lab Status: Final result Specimen: Blood from Arm, Left Updated: 03/22/24 1856     LACTIC ACID 3.0 mmol/L     Narrative:      Result may be elevated if tourniquet was used during collection.    Comprehensive metabolic panel [[id number]]  (Abnormal) Collected: 03/22/24 1822    Lab Status: Final result Specimen: Blood from Arm, Left Updated: 03/22/24 1852     Sodium 134 mmol/L      Potassium 4.1 mmol/L      Chloride 103 mmol/L      CO2 19 mmol/L      ANION GAP 12 mmol/L      BUN 14 mg/dL      Creatinine 0.78 mg/dL      Glucose 355 mg/dL      Calcium 9.7 mg/dL      AST 55 U/L       ALT 56 U/L      Alkaline Phosphatase 49 U/L      Total Protein 7.1 g/dL      Albumin 4.4 g/dL      Total Bilirubin 0.64 mg/dL      eGFR 80 ml/min/1.73sq m     Narrative:      [employer] guidelines for Chronic Kidney Disease (CKD):     Stage 1 with normal or high GFR (GFR > 90 mL/min/1.73 square meters)    Stage 2 Mild CKD (GFR = 60-89 mL/min/1.73 square meters)    Stage 3A Moderate CKD (GFR = 45-59 mL/min/1.73 square meters)    Stage 3B Moderate CKD (GFR = 30-44 mL/min/1.73 square meters)    Stage 4 Severe CKD (GFR = 15-29 mL/min/1.73 square meters)    Stage 5 End Stage CKD (GFR <15 mL/min/1.73 square meters)  Note: GFR calculation is accurate only with a steady state creatinine    Magnesium [[id number]]  (Abnormal) Collected: 03/22/24 1822    Lab Status: Final result Specimen: Blood from Arm, Left Updated: 03/22/24 1852     Magnesium 2.9 mg/dL     CBC and differential [[id number]]  (Abnormal) Collected: 03/22/24 1822    Lab Status: Final result Specimen: Blood from Arm, Left Updated: 03/22/24 1828     WBC 8.94 Thousand/uL      RBC 5.35 Million/uL      Hemoglobin 16.0 g/dL      Hematocrit 48.3 %      MCV 90 fL      MCH 29.9 pg      MCHC 33.1 g/dL      RDW 14.1 %      MPV 10.2 fL      Platelets 306 Thousands/uL      nRBC 0 /100 WBCs      Neutrophils Relative 86 %      Immature Grans % 0 %      Lymphocytes Relative 13 %      Monocytes Relative 1 %      Eosinophils Relative 0 %      Basophils Relative 0 %      Neutrophils Absolute 7.62 Thousands/µL      Absolute Immature Grans 0.03 Thousand/uL      Absolute Lymphocytes 1.19 Thousands/µL      Absolute Monocytes 0.06 Thousand/µL      Eosinophils Absolute 0.01 Thousand/µL      Basophils Absolute 0.03 Thousands/µL                    XR chest portable   ED Interpretation by Alexx Allen MD (03/22 2106)   No acute finding                 Procedures  ECG 12 Lead Documentation Only    Date/Time: 3/22/2024 5:59 PM    Performed by: Alexx  MD Tiffany  Authorized by: Alexx Allen MD    ECG reviewed by me, the ED Provider: yes    Patient location:  ED  Previous ECG:     Previous ECG:  Unavailable  Interpretation:     Interpretation: abnormal    Rate:     ECG rate:  191    ECG rate assessment: tachycardic    Rhythm:     Rhythm: ventricular tachycardia    Ectopy:     Ectopy: none    QRS:     QRS axis:  Normal    QRS intervals:  Normal  Conduction:     Conduction: normal    ST segments:     ST segments:  Normal  T waves:     T waves: normal    Comments:      Following defibrillation, repeat EKG at 6:12 PM and 6:16 PM and 6:25 PM showed rhythms alternating between sinus tachycardia and atrial flutter with rates between 110-136 and with ST elevation in leads V2 through V4.  Cardioversion    Date/Time: 3/22/2024 6:12 PM    Performed by: Alexx Allen MD  Authorized by: Alexx Allen MD    Procedure Detail:     Procedure note (site, laterality, method, findings):  Obtaining verbal emergent informed consent from the patient.  She was cardioverted at 200 J in a synchronized fashion after receiving etomidate 15 mg IV for sedation.  Patient converted from ventricular tachycardia to sinus rhythm.  There were no complications.  CriticalCare Time    Date/Time: 3/22/2024 6:07 PM    Performed by: Alexx Allen MD  Authorized by: Alexx Allen MD    Critical care provider statement:     Critical care time (minutes):  45    Critical care time was exclusive of:  Separately billable procedures and treating other patients    Critical care was necessary to treat or prevent imminent or life-threatening deterioration of the following conditions: Unstable arrhythmia.    Critical care was time spent personally by me on the following activities:  Obtaining history from patient or surrogate, development of treatment plan with patient or surrogate, discussions with consultants, evaluation of patient's response to treatment, examination of patient, ordering  and performing treatments and interventions, ordering and review of laboratory studies, ordering and review of radiographic studies and re-evaluation of patient's condition    I assumed direction of critical care for this patient from another provider in my specialty: no             ED Course  ED Course as of 03/22/24 2112   Fri Mar 22, 2024   1819 Discussed with Dr Myers, recommends heparin/brilibta, P1 xfer to tertiary care                                               Medical Decision Making  Patient presents to the emergency department with episodes of syncope and chest discomfort.      Based on the MSE and the history provided, diagnostic considerations include but are not limited to ACS, STEMI, NSTEMI, arrhythmia    Based on the work-up performed in the emergency department which includes physical examination, laboratory testing, imaging which may include advanced imaging as necessary such as CT scan or ultrasound, it is deemed that the patient will require admission to the hospital for treatment of ventricular tachycardia and STEMI.    Brief overview of ED course.  Patient presented after episodes of syncope at home.  EMS reported SVT but the underlying rhythm appeared to be atrial flutter based on the rhythm strips provided by EMS.  EMS had given adenosine without improvement.  At the time of ER arrival however, the patient's rhythm was ventricular tachycardia.  She received IV magnesium and IV amiodarone without improvement and subsequently was cardioverted emergently due to symptomatic ventricular tachycardia.  She was cardioverted using a synchronized pulse at 200 J.  She converted back to sinus rhythm.  Repeat EKGs following cardioversion revealed ST elevation in leads V2 through V4 and a STEMI alert was initiated.  Case was discussed with on-call cardiologist and patient was deemed appropriate for transfer to Cath Lab at tertiary care facility.    Amount and/or Complexity of Data Reviewed  Labs:  ordered. Decision-making details documented in ED Course.  Radiology: ordered and independent interpretation performed. Decision-making details documented in ED Course.  ECG/medicine tests: ordered and independent interpretation performed. Decision-making details documented in ED Course.     Details: Initially V. tach, alternating between sinus and atrial flutter after cardioversion with ST elevation in V2 through V4    Risk  OTC drugs.  Prescription drug management.             Disposition  Final diagnoses:   V-tach (HCC)   STEMI (ST elevation myocardial infarction) ([facility])     Time reflects when diagnosis was documented in both MDM as applicable and the Disposition within this note       Time User Action Codes Description Comment    3/22/2024  6:38 PM Alexx Allen Add [I47.20] V-tach ([facility])     3/22/2024  6:38 PM Alexx Allen Add [I21.3] STEMI (ST elevation myocardial infarction) ([facility])           ED Disposition       ED Disposition   Transfer to Another Facility-In Network    Condition   --    Date/Time   Fri Mar 22, 2024  6:37 PM    Comment   Vesna Langston should be transferred out to [facility] [place].               MD Documentation      Flowsheet Row Most Recent Value   Patient Condition The patient has been stabilized such that within reasonable medical probability, no material deterioration of the patient condition or the condition of the unborn child(gus) is likely to result from the transfer   Reason for Transfer Level of Care needed not available at this facility   Benefits of Transfer Specialized equipment and/or services available at the receiving facility (Include comment)________________________   Risks of Transfer Potential for delay in receiving treatment, Potential deterioration of medical condition, Loss of IV, Increased discomfort during transfer, Possible worsening of condition or death during transfer   Accepting Physician Dr Myers   Sending MD Dr Allen   Provider Certification General risk, such as  traffic hazards, adverse weather conditions, rough terrain or turbulence, possible failure of equipment (including vehicle or aircraft), or consequences of actions of persons outside the control of the transport personnel, Unanticipated needs of medical equipment and personnel during transport, Risk of worsening condition, The possibility of a transport vehicle being unavailable, Consent was not obtained as patient is committed to psychiatric facility and transfer is mandated          Follow-up Information    None         Discharge Medication List as of 3/22/2024  7:10 PM        CONTINUE these medications which have NOT CHANGED    Details   albuterol (PROAIR HFA) 90 mcg/act inhaler Inhale 2 puffs every 6 (six) hours as needed for wheezing, Starting Mon 7/29/2019, Normal      aspirin 81 mg chewable tablet Chew 81 mg daily, Historical Med      Aspirin Buf,CaCarb-MgCarb-MgO, 81 MG TABS Take by mouth, Historical Med      diphenhydrAMINE (BENADRYL) 50 mg capsule Take 50 mg by mouth every 6 (six) hours as needed for itching, Historical Med      meclizine (ANTIVERT) 25 mg tablet Take 1 tablet (25 mg total) by mouth every 8 (eight) hours as needed for dizziness, Starting Sat 9/11/2021, Normal      Multiple Vitamin (MULTIVITAMIN) capsule Take 1 capsule by mouth daily, Historical Med      predniSONE 10 mg tablet Take once daily all days pills on this schedule 6- 6- 5- 4- 3- 2- 1, Normal      ranitidine (ZANTAC) 150 MG capsule Take 150 mg by mouth as needed for indigestion or heartburn, Historical Med             No discharge procedures on file.    PDMP Review       None            ED Provider  Electronically Signed by             Alexx Allen MD  03/22/24 4453

## 2024-03-23 ENCOUNTER — APPOINTMENT (INPATIENT)
Dept: NON INVASIVE DIAGNOSTICS | Facility: HOSPITAL | Age: 64
DRG: 222 | End: 2024-03-23
Payer: COMMERCIAL

## 2024-03-23 LAB
2HR DELTA HS TROPONIN: 336 NG/L
4HR DELTA HS TROPONIN: 451 NG/L
ALBUMIN SERPL BCP-MCNC: 4 G/DL (ref 3.5–5)
ALP SERPL-CCNC: 44 U/L (ref 34–104)
ALT SERPL W P-5'-P-CCNC: 44 U/L (ref 7–52)
ANION GAP SERPL CALCULATED.3IONS-SCNC: 10 MMOL/L (ref 4–13)
AORTIC ROOT: 3.1 CM
AORTIC VALVE MEAN VELOCITY: 7 M/S
APICAL FOUR CHAMBER EJECTION FRACTION: 36 %
ASCENDING AORTA: 2.8 CM
AST SERPL W P-5'-P-CCNC: 52 U/L (ref 13–39)
ATRIAL RATE: 100 BPM
AV LVOT MEAN GRADIENT: 1 MMHG
AV LVOT PEAK GRADIENT: 3 MMHG
AV MEAN GRADIENT: 2 MMHG
AV PEAK GRADIENT: 4 MMHG
AV VELOCITY RATIO: 0.81
BASOPHILS # BLD AUTO: 0.02 THOUSANDS/ÂΜL (ref 0–0.1)
BASOPHILS NFR BLD AUTO: 0 % (ref 0–1)
BILIRUB DIRECT SERPL-MCNC: 0.06 MG/DL (ref 0–0.2)
BILIRUB SERPL-MCNC: 0.76 MG/DL (ref 0.2–1)
BSA FOR ECHO PROCEDURE: 2.1 M2
BUN SERPL-MCNC: 15 MG/DL (ref 5–25)
CALCIUM SERPL-MCNC: 8.8 MG/DL (ref 8.4–10.2)
CARDIAC TROPONIN I PNL SERPL HS: 1266 NG/L
CARDIAC TROPONIN I PNL SERPL HS: 1381 NG/L
CARDIAC TROPONIN I PNL SERPL HS: 1693 NG/L (ref 8–18)
CHLORIDE SERPL-SCNC: 104 MMOL/L (ref 96–108)
CO2 SERPL-SCNC: 20 MMOL/L (ref 21–32)
CREAT SERPL-MCNC: 0.69 MG/DL (ref 0.6–1.3)
DOP CALC AO PEAK VEL: 1.02 M/S
DOP CALC AO VTI: 17.27 CM
DOP CALC LVOT PEAK VEL VTI: 13.53 CM
DOP CALC LVOT PEAK VEL: 0.83 M/S
E WAVE DECELERATION TIME: 128 MS
E/A RATIO: 2.14
EOSINOPHIL # BLD AUTO: 0 THOUSAND/ÂΜL (ref 0–0.61)
EOSINOPHIL NFR BLD AUTO: 0 % (ref 0–6)
ERYTHROCYTE [DISTWIDTH] IN BLOOD BY AUTOMATED COUNT: 14.3 % (ref 11.6–15.1)
FRACTIONAL SHORTENING: 26 (ref 28–44)
GFR SERPL CREATININE-BSD FRML MDRD: 91 ML/MIN/1.73SQ M
GLUCOSE SERPL-MCNC: 215 MG/DL (ref 65–140)
GLUCOSE SERPL-MCNC: 241 MG/DL (ref 65–140)
GLUCOSE SERPL-MCNC: 245 MG/DL (ref 65–140)
GLUCOSE SERPL-MCNC: 258 MG/DL (ref 65–140)
GLUCOSE SERPL-MCNC: 281 MG/DL (ref 65–140)
GLUCOSE SERPL-MCNC: 336 MG/DL (ref 65–140)
HCT VFR BLD AUTO: 45.9 % (ref 34.8–46.1)
HGB BLD-MCNC: 15 G/DL (ref 11.5–15.4)
IMM GRANULOCYTES # BLD AUTO: 0.08 THOUSAND/UL (ref 0–0.2)
IMM GRANULOCYTES NFR BLD AUTO: 0 % (ref 0–2)
INTERVENTRICULAR SEPTUM IN DIASTOLE (PARASTERNAL SHORT AXIS VIEW): 1.4 CM
INTERVENTRICULAR SEPTUM: 1.4 CM (ref 0.6–1.1)
LAAS-AP2: 27.7 CM2
LAAS-AP4: 27.5 CM2
LACTATE SERPL-SCNC: 1.7 MMOL/L (ref 0.5–2)
LACTATE SERPL-SCNC: 2.5 MMOL/L (ref 0.5–2)
LACTATE SERPL-SCNC: 2.8 MMOL/L (ref 0.5–2)
LACTATE SERPL-SCNC: 3 MMOL/L (ref 0.5–2)
LEFT ATRIUM SIZE: 4.6 CM
LEFT ATRIUM VOLUME (MOD BIPLANE): 100 ML
LEFT ATRIUM VOLUME INDEX (MOD BIPLANE): 47.6 ML/M2
LEFT INTERNAL DIMENSION IN SYSTOLE: 3.5 CM (ref 2.1–4)
LEFT VENTRICLE DIASTOLIC VOLUME (MOD BIPLANE): 210 ML
LEFT VENTRICLE DIASTOLIC VOLUME INDEX (MOD BIPLANE): 100 ML/M2
LEFT VENTRICLE SYSTOLIC VOLUME (MOD BIPLANE): 148 ML
LEFT VENTRICLE SYSTOLIC VOLUME INDEX (MOD BIPLANE): 70.5 ML/M2
LEFT VENTRICULAR INTERNAL DIMENSION IN DIASTOLE: 4.7 CM (ref 3.5–6)
LEFT VENTRICULAR POSTERIOR WALL IN END DIASTOLE: 1 CM
LEFT VENTRICULAR STROKE VOLUME: 50 ML
LV EF: 29 %
LVSV (TEICH): 50 ML
LYMPHOCYTES # BLD AUTO: 1.39 THOUSANDS/ÂΜL (ref 0.6–4.47)
LYMPHOCYTES NFR BLD AUTO: 8 % (ref 14–44)
MAGNESIUM SERPL-MCNC: 2.1 MG/DL (ref 1.9–2.7)
MCH RBC QN AUTO: 30.4 PG (ref 26.8–34.3)
MCHC RBC AUTO-ENTMCNC: 32.7 G/DL (ref 31.4–37.4)
MCV RBC AUTO: 93 FL (ref 82–98)
MONOCYTES # BLD AUTO: 1.16 THOUSAND/ÂΜL (ref 0.17–1.22)
MONOCYTES NFR BLD AUTO: 6 % (ref 4–12)
MV PEAK A VEL: 0.44 M/S
MV PEAK E VEL: 94 CM/S
MV STENOSIS PRESSURE HALF TIME: 37 MS
MV VALVE AREA P 1/2 METHOD: 5.95
NEUTROPHILS # BLD AUTO: 15.97 THOUSANDS/ÂΜL (ref 1.85–7.62)
NEUTS SEG NFR BLD AUTO: 86 % (ref 43–75)
NRBC BLD AUTO-RTO: 0 /100 WBCS
P AXIS: 66 DEGREES
PLATELET # BLD AUTO: 326 THOUSANDS/UL (ref 149–390)
PMV BLD AUTO: 10.3 FL (ref 8.9–12.7)
POTASSIUM SERPL-SCNC: 4.9 MMOL/L (ref 3.5–5.3)
PR INTERVAL: 158 MS
PROT SERPL-MCNC: 6.7 G/DL (ref 6.4–8.4)
QRS AXIS: 90 DEGREES
QRSD INTERVAL: 80 MS
QT INTERVAL: 382 MS
QTC INTERVAL: 492 MS
RA PRESSURE ESTIMATED: 10 MMHG
RBC # BLD AUTO: 4.94 MILLION/UL (ref 3.81–5.12)
RIGHT VENTRICLE ID DIMENSION: 2.9 CM
RV PSP: 51 MMHG
SL CV LEFT ATRIUM LENGTH A2C: 6.2 CM
SL CV LV EF: 35
SL CV PED ECHO LEFT VENTRICLE DIASTOLIC VOLUME (MOD BIPLANE) 2D: 100 ML
SL CV PED ECHO LEFT VENTRICLE SYSTOLIC VOLUME (MOD BIPLANE) 2D: 50 ML
SODIUM SERPL-SCNC: 134 MMOL/L (ref 135–147)
T WAVE AXIS: 80 DEGREES
TR MAX PG: 41 MMHG
TR PEAK VELOCITY: 3.2 M/S
TRICUSPID ANNULAR PLANE SYSTOLIC EXCURSION: 2 CM
TRICUSPID VALVE PEAK REGURGITATION VELOCITY: 3.22 M/S
VENTRICULAR RATE: 100 BPM
WBC # BLD AUTO: 18.62 THOUSAND/UL (ref 4.31–10.16)

## 2024-03-23 PROCEDURE — 85025 COMPLETE CBC W/AUTO DIFF WBC: CPT | Performed by: NURSE PRACTITIONER

## 2024-03-23 PROCEDURE — 83605 ASSAY OF LACTIC ACID: CPT | Performed by: INTERNAL MEDICINE

## 2024-03-23 PROCEDURE — 83735 ASSAY OF MAGNESIUM: CPT | Performed by: NURSE PRACTITIONER

## 2024-03-23 PROCEDURE — 84484 ASSAY OF TROPONIN QUANT: CPT | Performed by: INTERNAL MEDICINE

## 2024-03-23 PROCEDURE — 80048 BASIC METABOLIC PNL TOTAL CA: CPT | Performed by: INTERNAL MEDICINE

## 2024-03-23 PROCEDURE — 83605 ASSAY OF LACTIC ACID: CPT | Performed by: NURSE PRACTITIONER

## 2024-03-23 PROCEDURE — 80076 HEPATIC FUNCTION PANEL: CPT | Performed by: INTERNAL MEDICINE

## 2024-03-23 PROCEDURE — 82948 REAGENT STRIP/BLOOD GLUCOSE: CPT

## 2024-03-23 PROCEDURE — 93010 ELECTROCARDIOGRAM REPORT: CPT | Performed by: INTERNAL MEDICINE

## 2024-03-23 PROCEDURE — 84484 ASSAY OF TROPONIN QUANT: CPT | Performed by: NURSE PRACTITIONER

## 2024-03-23 PROCEDURE — 93306 TTE W/DOPPLER COMPLETE: CPT | Performed by: INTERNAL MEDICINE

## 2024-03-23 PROCEDURE — 93306 TTE W/DOPPLER COMPLETE: CPT

## 2024-03-23 PROCEDURE — 99233 SBSQ HOSP IP/OBS HIGH 50: CPT | Performed by: INTERNAL MEDICINE

## 2024-03-23 PROCEDURE — 93005 ELECTROCARDIOGRAM TRACING: CPT

## 2024-03-23 RX ORDER — CLOPIDOGREL BISULFATE 75 MG/1
600 TABLET ORAL ONCE
Status: COMPLETED | OUTPATIENT
Start: 2024-03-23 | End: 2024-03-23

## 2024-03-23 RX ORDER — NICOTINE 21 MG/24HR
21 PATCH, TRANSDERMAL 24 HOURS TRANSDERMAL DAILY
Status: DISCONTINUED | OUTPATIENT
Start: 2024-03-23 | End: 2024-03-28 | Stop reason: HOSPADM

## 2024-03-23 RX ORDER — GABAPENTIN 100 MG/1
100 CAPSULE ORAL 2 TIMES DAILY
Status: DISCONTINUED | OUTPATIENT
Start: 2024-03-23 | End: 2024-03-28 | Stop reason: HOSPADM

## 2024-03-23 RX ORDER — METOPROLOL TARTRATE 1 MG/ML
5 INJECTION, SOLUTION INTRAVENOUS ONCE
Status: COMPLETED | OUTPATIENT
Start: 2024-03-23 | End: 2024-03-24

## 2024-03-23 RX ORDER — FUROSEMIDE 10 MG/ML
40 INJECTION INTRAMUSCULAR; INTRAVENOUS
Status: DISCONTINUED | OUTPATIENT
Start: 2024-03-23 | End: 2024-03-24

## 2024-03-23 RX ORDER — METOPROLOL SUCCINATE 50 MG/1
50 TABLET, EXTENDED RELEASE ORAL DAILY
Status: DISCONTINUED | OUTPATIENT
Start: 2024-03-24 | End: 2024-03-28 | Stop reason: HOSPADM

## 2024-03-23 RX ORDER — CLOPIDOGREL BISULFATE 75 MG/1
75 TABLET ORAL DAILY
Status: DISCONTINUED | OUTPATIENT
Start: 2024-03-24 | End: 2024-03-28 | Stop reason: HOSPADM

## 2024-03-23 RX ADMIN — NICOTINE 21 MG: 21 PATCH, EXTENDED RELEASE TRANSDERMAL at 21:49

## 2024-03-23 RX ADMIN — INSULIN LISPRO 2 UNITS: 100 INJECTION, SOLUTION INTRAVENOUS; SUBCUTANEOUS at 16:43

## 2024-03-23 RX ADMIN — CLOPIDOGREL BISULFATE 600 MG: 75 TABLET ORAL at 21:32

## 2024-03-23 RX ADMIN — ASPIRIN 81 MG CHEWABLE TABLET 81 MG: 81 TABLET CHEWABLE at 08:03

## 2024-03-23 RX ADMIN — METOPROLOL TARTRATE 25 MG: 25 TABLET, FILM COATED ORAL at 22:44

## 2024-03-23 RX ADMIN — HEPARIN SODIUM 5000 UNITS: 5000 INJECTION INTRAVENOUS; SUBCUTANEOUS at 21:35

## 2024-03-23 RX ADMIN — FUROSEMIDE 40 MG: 40 TABLET ORAL at 08:40

## 2024-03-23 RX ADMIN — HEPARIN SODIUM 5000 UNITS: 5000 INJECTION INTRAVENOUS; SUBCUTANEOUS at 14:17

## 2024-03-23 RX ADMIN — INSULIN LISPRO 2 UNITS: 100 INJECTION, SOLUTION INTRAVENOUS; SUBCUTANEOUS at 21:35

## 2024-03-23 RX ADMIN — INSULIN LISPRO 4 UNITS: 100 INJECTION, SOLUTION INTRAVENOUS; SUBCUTANEOUS at 00:55

## 2024-03-23 RX ADMIN — INSULIN LISPRO 3 UNITS: 100 INJECTION, SOLUTION INTRAVENOUS; SUBCUTANEOUS at 08:03

## 2024-03-23 RX ADMIN — APIXABAN 5 MG: 5 TABLET, FILM COATED ORAL at 11:29

## 2024-03-23 RX ADMIN — HEPARIN SODIUM 5000 UNITS: 5000 INJECTION INTRAVENOUS; SUBCUTANEOUS at 05:49

## 2024-03-23 RX ADMIN — AMIODARONE HYDROCHLORIDE 0.5 MG/MIN: 50 INJECTION, SOLUTION INTRAVENOUS at 18:01

## 2024-03-23 RX ADMIN — GABAPENTIN 100 MG: 100 CAPSULE ORAL at 17:05

## 2024-03-23 RX ADMIN — PERFLUTREN 0.8 ML/MIN: 6.52 INJECTION, SUSPENSION INTRAVENOUS at 10:50

## 2024-03-23 RX ADMIN — INSULIN LISPRO 4 UNITS: 100 INJECTION, SOLUTION INTRAVENOUS; SUBCUTANEOUS at 11:18

## 2024-03-23 RX ADMIN — TICAGRELOR 90 MG: 90 TABLET ORAL at 08:03

## 2024-03-23 RX ADMIN — METOPROLOL SUCCINATE 25 MG: 25 TABLET, EXTENDED RELEASE ORAL at 11:29

## 2024-03-23 RX ADMIN — ACETAMINOPHEN 650 MG: 325 TABLET, FILM COATED ORAL at 21:37

## 2024-03-23 RX ADMIN — GABAPENTIN 100 MG: 100 CAPSULE ORAL at 14:17

## 2024-03-23 RX ADMIN — FUROSEMIDE 40 MG: 10 INJECTION, SOLUTION INTRAMUSCULAR; INTRAVENOUS at 16:38

## 2024-03-23 RX ADMIN — ATORVASTATIN CALCIUM 80 MG: 80 TABLET, FILM COATED ORAL at 17:05

## 2024-03-23 RX ADMIN — APIXABAN 5 MG: 5 TABLET, FILM COATED ORAL at 17:05

## 2024-03-23 NOTE — ASSESSMENT & PLAN NOTE
Suspect secondary to STEMI with possible component of cardiogenic shock, which is resolving s/p PCI  3/22/24 initial lactate: 3.0    Plan:   Repeat lactate  Monitor clinical exam and other endpoints of resuscitation   Follow up echocardiogram   Continue supportive care

## 2024-03-23 NOTE — PROGRESS NOTES
[facility]  Progress Note  Name: Vesna Langston I  MRN: [id number]  [facility] I Date of Admission: 3/22/2024   Date of Service: 3/23/2024 I Hospital Day: 1    Assessment/Plan   * ST elevation myocardial infarction involving left anterior descending (LAD) coronary artery (HCC)  Assessment & Plan  3/22/24: Presented to [facility] after 4 syncopal events at home with chest pressure and nausea. Initial EKG with lateral ST elevations.   3/22/24 LHC: 100% ostial LAD occlusion, possibly chronic versus acute. NATHAN x1. LVEDP 35 mmHg    Plan:  ASA 81mg daily   S/p 324mg loading dose (3/22/24)   Brilinta 90mg q12h   S/p 180mg loading dose (3/22/24)   Atorvastatin 80mg qHS   Metoprolol succiante 25mg q24h   Check echocardiogram   Continue to trend troponins to peak   Continue close telemetry monitoring for arrhythmias   Maintain K > 4.0, Mag > 2.0   Appreciate cardiology recommendations and follow up     Acute respiratory insufficiency  Assessment & Plan  3/22/24: Presented to ICU from cath lab with SpO2 low 80's on room air. Initiated supplemental oxygen with improvement. Suspect new cardiomyopathy with some element of volume overload. Given 40mg IV lasix prior to arrival     Plan:   Continue nasal cannula   Wean supplemental oxygen as able to maintain SpO2 > 90%   Continue diuresis as tolerated   Lasix 40mg BID   Continue pulmonary hygiene. Incentive spirometer q1h while awake, encourage coughing and deep breathing. Upright positioning  Suction as needed and closely monitor secretions. Maintain HOB >30 degrees. Q4h oral care with chlorhexidine BID      VT (ventricular tachycardia) (HCC)  Assessment & Plan  3/22/24: Presented to [facility] in rapid atrial flutter, given adenosine with conversion to sinus, but then had VT which required shock. Likely due to cardiac ischemia. In cath lab, given amiodarone 150mg x2, metoprolol 2.5mg IV, magnesium    Plan:   Amiodarone vs lidocaine injection for  recurrent episode  Optimize electrolyte: K > 4.0, mag >2.0   Amiodarone 0.5 mg/min  Continue AV gabo blockers:  Metoprolol succinate 25mg q24  Monitor telemetry       Atrial flutter (HCC)  Assessment & Plan  3/22/24: Presented to [facility] in rapid atrial flutter, given adenosine with conversion. Atrial flutter again in cath lab, given amiodarone 150mg x2, metoprolol 2.5mg IV, magnesium with conversion   Currently NSR     Plan:  Goal HR < 110   Amiodarone 0.5 mg/min   Continue AV gabo blockers:  Metoprolol succinate 25mg q24  Optimize electrolytes: K > 4.0, mag > 2.0   Systemic anticoagulation: None   Start if recurrent a fib/flutter   Continue to monitor telemetry     Lactic acidosis  Assessment & Plan  Suspect secondary to STEMI with possible component of cardiogenic shock, which is resolving s/p PCI  3/22/24 initial lactate: 3.0    Plan:   Trend until clear   Monitor clinical exam and other endpoints of resuscitation   Follow up echocardiogram   Continue supportive care       New onset type 2 diabetes mellitus (HCC)  Assessment & Plan    Lab Results   Component Value Date    HGBA1C 7.9 (H) 03/22/2024     Plan:   Continue AC/HS sliding scale algorithm   Adjust insulin regimen as needed to maintain goal -180  Carb controlled diet   Consider endocrinology consultation on transfer to provide education and establish long-term plan         Hyperlipemia  Assessment & Plan  Lab Results   Component Value Date    CHOLESTEROL 323 (H) 03/22/2024    TRIG 185 (H) 03/22/2024    HDL 50 03/22/2024    LDLCALC 236 (H) 03/22/2024    NONHDLC 273 03/22/2024      Plan:   Atorvastatin 80mg qHS    Dietary and lifestyle changes       Tobacco abuse  Assessment & Plan  Patient currently smokes 1 pack per day. Unknown duration     Plan:   Patient declined nicotine patch at this time   Provide cessation education              Disposition: Critical care    ICU Core Measures     A: Assess, Prevent, and Manage Pain Has pain been assessed?  Yes  Need for changes to pain regimen? No   B: Both SAT/SAT  N/A   C: Choice of Sedation RASS Goal: 0 Alert and Calm or N/A patient not on sedation  Need for changes to sedation or analgesia regimen? NA   D: Delirium CAM-ICU: Negative   E: Early Mobility  Plan for early mobility? Yes   F: Family Engagement Plan for family engagement today? Yes         Prophylaxis:  VTE VTE covered by:  heparin (porcine), Subcutaneous       Stress Ulcer  not orderedcovered byranitidine (ZANTAC) 150 MG capsule [[id number]] (Long-Term Med)         Significant 24hr Events     24hr events: No episodes of NSVT or atrial fib/flutter overnight. Remained on supplemental oxygen with ongoing UOP following procedural lasix.      Subjective   Review of Systems   Constitutional:  Positive for appetite change (Hungry).   Cardiovascular:  Negative for chest pain and palpitations.   Gastrointestinal:  Negative for nausea.   Neurological:  Negative for dizziness.      Objective                            Vitals I/O      Most Recent Min/Max in 24hrs   Temp 97.8 °F (36.6 °C) Temp  Min: 97.8 °F (36.6 °C)  Max: 97.8 °F (36.6 °C)   Pulse 94 Pulse  Min: 92  Max: 189   Resp 15 Resp  Min: 13  Max: 23   BP 91/53 BP  Min: 91/53  Max: 154/114   O2 Sat 96 % SpO2  Min: 90 %  Max: 98 %      Intake/Output Summary (Last 24 hours) at 3/23/2024 0442  Last data filed at 3/23/2024 0400  Gross per 24 hour   Intake 781.5 ml   Output 1375 ml   Net -593.5 ml       Diet Cardiovascular; Cardiac; Consistent Carbohydrate Diet Level 2 (5 carb servings/75 grams CHO/meal)    Invasive Monitoring           Physical Exam   Physical Exam  Vitals and nursing note reviewed.   Skin:     General: Skin is warm and dry.      Capillary Refill: Capillary refill takes less than 2 seconds.          HENT:      Head: Normocephalic and atraumatic.      Mouth/Throat:      Lips: Pink.   Cardiovascular:      Rate and Rhythm: Normal rate and regular rhythm.      Pulses:           Radial pulses are 2+  on the right side and 2+ on the left side.        Dorsalis pedis pulses are 1+ on the right side and 1+ on the left side.      Heart sounds: Normal heart sounds.   Musculoskeletal:      Right lower leg: No edema.      Left lower leg: No edema.   Abdominal: General: Abdomen is protuberant. Bowel sounds are decreased. There is no distension.      Palpations: Abdomen is soft.      Tenderness: There is no abdominal tenderness.   Constitutional:       General: She is sleeping. She is not in acute distress.     Appearance: She is well-developed and well-nourished.      Interventions: Nasal cannula in place.   Pulmonary:      Effort: Pulmonary effort is normal.      Breath sounds: Examination of the right-lower field reveals decreased breath sounds. Examination of the left-lower field reveals decreased breath sounds. Decreased breath sounds present.   Psychiatric:         Behavior: Behavior is cooperative.   Neurological:      General: No focal deficit present.      Mental Status: She is easily aroused.      GCS: GCS eye subscore is 4. GCS verbal subscore is 5. GCS motor subscore is 6.   Genitourinary/Anorectal:     Comments: Urine clear, light yellow. Purewik in place            Diagnostic Studies      EKG: NSR   Imaging: No new imaging      Medications:  Scheduled PRN   aspirin, 81 mg, Daily  atorvastatin, 80 mg, QPM  furosemide, 40 mg, BID (diuretic)  heparin (porcine), 5,000 Units, Q8H MONA  insulin lispro, 1-5 Units, HS  insulin lispro, 1-6 Units, TID AC  metoprolol succinate, 25 mg, Daily  ticagrelor, 90 mg, BID      acetaminophen, 650 mg, Q4H PRN       Continuous    amiodarone (CORDARONE) 900 mg in dextrose 5 % 500 mL infusion, 0.5 mg/min, Last Rate: 0.5 mg/min (03/23/24 0246)         Labs:    CBC    Recent Labs     03/22/24  1822   WBC 8.94   HGB 16.0*   HCT 48.3*        BMP    Recent Labs     03/22/24  1822   SODIUM 134*   K 4.1      CO2 19*   AGAP 12   BUN 14   CREATININE 0.78   CALCIUM 9.7        Coags    No recent results     Additional Electrolytes  Recent Labs     03/22/24  1822   MG 2.9*          Blood Gas    No recent results  No recent results LFTs  Recent Labs     03/22/24  1822   ALT 56*   AST 55*   ALKPHOS 49   ALB 4.4   TBILI 0.64       Infectious  No recent results  Glucose  Recent Labs     03/22/24 1822   GLUC 355*               MAHI Ferrer

## 2024-03-23 NOTE — ASSESSMENT & PLAN NOTE
3/22/24: Presented to ICU from cath lab with SpO2 low 80's on room air. Initiated supplemental oxygen with improvement. Suspect new cardiomyopathy with some element of volume overload. Given 40mg IV lasix prior to arrival     Plan:   CXR now  Continue nasal cannula   Wean supplemental oxygen as able to maintain SpO2 > 90%   Continue diuresis as tolerated    Continue pulmonary hygiene. Incentive spirometer q1h while awake, encourage coughing and deep breathing. Upright positioning  Suction as needed and closely monitor secretions. Maintain HOB >30 degrees. Q4h oral care with chlorhexidine BID

## 2024-03-23 NOTE — ASSESSMENT & PLAN NOTE
Patient currently smokes 1 pack per day. Unknown duration     Plan:   Patient declined nicotine patch at this time   Provide cessation education

## 2024-03-23 NOTE — PROGRESS NOTES
Cardiology Team 2 Progress Note   Vesna Langston 63 y.o. female MRN: [id number]  Unit/Bed#: [facility] 516-01 Encounter: [id number]            Assessment:  Vesna Langston is a 63 y.o. year old female with no significant cardiac history presented after having multiple syncopal episodes.  At the time of presentation to [facility], patient was found to be in a flutter which was broken with adenosine.  Patient went into ventricular tachycardia shortly after requiring shock, subsequent EKG showed possible lateral STEMI, patient was taken to [facility] Cath Lab.  Patient had heavily calcified ostial LAD lesion.  Patient underwent successful PCI of the lesion.  Post stenting IVUS was done showing good opposition.  Intracoronary adenosine was given as well due to initial low flow.  LVEDP was elevated at 35.      STEMI.  Likely  of the ostial LAD given heavy calcification and difficulty passing lesion during PCI.  Patient required intracoronary adenosine for low flow after stenting.  Had a 2.5 x 48 mm drug-eluting stent placed from the ostial LAD down.  LV EDP was elevated to 35.  Ventricular Tachycardia. Likely 2/2 ischemia. Patient was give an amiodarone bolus at [facility], another bolus given in the cath lab when the patient was in NSR.   Likely ischemic cardiomyopathy.  Patient's BNP mildly elevated, chest x-ray at the time of admission shows some vascular congestion.  Limited view echo was done by me at bedside shortly after STEMI, showing decreased EF 35-40% with anterior lateral wall motion abnormality.  Atrial flutter. Noted be in 2:1 atrial flutter, converted to NSR during [facility].  Patient was started on amiodarone in the Cath Lab once patient converted to NSR.  Type II DM. A1c of 7.9  Hyperlipidemia.            Plan:  Continue amiodarone for now, can transition to oral amiodarone  DAPT with ASA and brilinta  Continue statin  A1c and lipid panel pending   TTE pending  Patient had good urine output with 40 of IV Lasix, has put out 1.5 L, will  continue IV Lasix 40 mg twice daily for today  Monitor Daily weights, strict BRANDO's    Subjective:     No overnight events. No events noted on tele. Patient is doing well and does not have any complaints at this time.         Vitals: /60   Pulse 95   Temp 97.8 °F (36.6 °C) (Oral)   Resp 21   Wt 97 kg (213 lb 13.5 oz)   SpO2 95%   BMI 32.52 kg/m²       Intake/Output Summary (Last 24 hours) at 3/23/2024 0729  Last data filed at 3/23/2024 0638  Gross per 24 hour   Intake 1261.5 ml   Output 1575 ml   Net -313.5 ml       24 Hours VS:   Temp:  [97.8 °F (36.6 °C)] 97.8 °F (36.6 °C)  HR:  [] 95  Resp:  [13-23] 21  BP: ()/() 116/60  SpO2:  [90 %-98 %] 95 %     Review of System:  Review of system was conducted and was negative except for as stated in the subjective.    Physical Exam:  Physical Exam  Constitutional:       Appearance: Normal appearance. She is not ill-appearing.   HENT:      Head: Normocephalic.   Cardiovascular:      Rate and Rhythm: Normal rate and regular rhythm.   Pulmonary:      Effort: No respiratory distress.      Breath sounds: Rales present.   Abdominal:      General: There is no distension.   Musculoskeletal:      Right lower leg: No edema.      Left lower leg: No edema.   Skin:     General: Skin is warm.   Neurological:      Mental Status: She is alert.          Inpatient medications:   Scheduled Meds:  Current Facility-Administered Medications   Medication Dose Route Frequency Provider Last Rate    acetaminophen  650 mg Oral Q4H PRN Gm Oliver MD      amiodarone (CORDARONE) 900 mg in dextrose 5 % 500 mL infusion  0.5 mg/min Intravenous Continuous Gm Oliver MD 0.5 mg/min (03/23/24 9446)    aspirin  81 mg Oral Daily Gm Oliver MD      atorvastatin  80 mg Oral QPM Gm Oliver MD      furosemide  40 mg Oral BID (diuretic) Gm Oliver MD      heparin (porcine)  5,000 Units Subcutaneous Q8H MAHI Marshall      insulin lispro  1-5 Units  Subcutaneous HS MAHI Ferrer      insulin lispro  1-6 Units Subcutaneous TID AC MAHI Ferrer      metoprolol succinate  25 mg Oral Daily Gm Oliver MD      ticagrelor  90 mg Oral BID Gm Oliver MD       Continuous Infusions:amiodarone (CORDARONE) 900 mg in dextrose 5 % 500 mL infusion, 0.5 mg/min, Last Rate: 0.5 mg/min (03/23/24 0246)      PRN Meds:.  acetaminophen     Labs & Results:  Results from last 7 days   Lab Units 03/23/24  0549 03/22/24  1822   POTASSIUM mmol/L 4.9 4.1   CO2 mmol/L 20* 19*   CHLORIDE mmol/L 104 103   BUN mg/dL 15 14   CREATININE mg/dL 0.69 0.78     Results from last 7 days   Lab Units 03/23/24  0549 03/22/24  1822   HEMOGLOBIN g/dL 15.0 16.0*   HEMATOCRIT % 45.9 48.3*   PLATELETS Thousands/uL 326 306     Results from last 7 days   Lab Units 03/22/24  2156 03/22/24  1822   TRIGLYCERIDES mg/dL  --  185*   HDL mg/dL  --  50   LDL CALC mg/dL  --  236*   HEMOGLOBIN A1C % 7.9*  --        Telemetry:   Personally reviewed by Gm Oliver MD:         VTE Prophylaxis: Heparin            Gm Oliver MD  Cardiology Fellow   FY-1      Epic/ [employer]/Care Everywhere records reviewed: y    ** Please Note: Fluency DirectDictation voice to text software may have been used in the creation of this document. **

## 2024-03-23 NOTE — ASSESSMENT & PLAN NOTE
3/22/24: Presented to [facility] in rapid atrial flutter, given adenosine with conversion to sinus, but then had VT which required shock. Likely due to cardiac ischemia. In cath lab, given amiodarone 150mg x2, metoprolol 2.5mg IV, magnesium    Plan:   Amiodarone vs lidocaine injection for recurrent episode  Optimize electrolyte: K > 4.0, mag >2.0   Amiodarone 0.5 mg/min  Continue AV gabo blockers:  Metoprolol succinate 25mg q24  Monitor telemetry

## 2024-03-23 NOTE — ASSESSMENT & PLAN NOTE
Suspect secondary to STEMI with possible component of cardiogenic shock, which is resolving s/p PCI  3/22/24 initial lactate: 3.0    Plan:   Trend until clear   Monitor clinical exam and other endpoints of resuscitation   Follow up echocardiogram   Continue supportive care

## 2024-03-23 NOTE — ASSESSMENT & PLAN NOTE
3/22/24: Presented to [facility] after 4 syncopal events at home with chest pressure and nausea. Initial EKG with lateral ST elevations.   3/22/24 LHC: 100% ostial LAD occlusion, possibly chronic versus acute. NATHAN x1. LVEDP 35 mmHg    Plan:  ASA 81mg daily   S/p 324mg loading dose (3/22/24)   Brilinta 90mg q12h   S/p 180mg loading dose (3/22/24)   Atorvastatin 80mg qHS   Metoprolol succiante 25mg q24h   Check echocardiogram   Continue to trend troponins to peak   Check hemoglobin A1c   Continue close telemetry monitoring for arrhythmias   Maintain K > 4.0, Mag > 2.0   Appreciate cardiology recommendations and follow up

## 2024-03-23 NOTE — ASSESSMENT & PLAN NOTE
3/22/24: Presented to [facility] in rapid atrial flutter, given adenosine with conversion to sinus, but then had VT which required shock. Likely due to cardiac ischemia. In cath lab, given amiodarone 150mg x2, metoprolol 2.5mg IV, magnesium    Plan:   Amiodarone vs lidocaine injection for recurrent episode  Optimize electrolyte: K > 4.0, mag >2.0   Amiodarone 1mg/min  Continue AV gabo blockers:  Metoprolol succinate 25mg q24  Monitor telemetry

## 2024-03-23 NOTE — CONSULTS
[facility]  Consult  Name: Vesna Langston 63 y.o. female I MRN: [id number]  Unit/Bed#: [facility] 516-01 I Date of Admission: 3/22/2024   Date of Service: 3/22/2024 I Hospital Day: 0    Consults    Assessment/Plan   * ST elevation myocardial infarction involving left anterior descending (LAD) coronary artery (HCC)  Assessment & Plan  3/22/24: Presented to [facility] after 4 syncopal events at home with chest pressure and nausea. Initial EKG with lateral ST elevations.   3/22/24 LHC: 100% ostial LAD occlusion, possibly chronic versus acute. NATHAN x1. LVEDP 35 mmHg    Plan:  ASA 81mg daily   S/p 324mg loading dose (3/22/24)   Brilinta 90mg q12h   S/p 180mg loading dose (3/22/24)   Atorvastatin 80mg qHS   Metoprolol succiante 25mg q24h   Check echocardiogram   Continue to trend troponins to peak   Check hemoglobin A1c   Continue close telemetry monitoring for arrhythmias   Maintain K > 4.0, Mag > 2.0   Appreciate cardiology recommendations and follow up     Acute respiratory insufficiency  Assessment & Plan  3/22/24: Presented to ICU from cath lab with SpO2 low 80's on room air. Initiated supplemental oxygen with improvement. Suspect new cardiomyopathy with some element of volume overload. Given 40mg IV lasix prior to arrival     Plan:   CXR now  Continue nasal cannula   Wean supplemental oxygen as able to maintain SpO2 > 90%   Continue diuresis as tolerated    Continue pulmonary hygiene. Incentive spirometer q1h while awake, encourage coughing and deep breathing. Upright positioning  Suction as needed and closely monitor secretions. Maintain HOB >30 degrees. Q4h oral care with chlorhexidine BID      VT (ventricular tachycardia) ([facility])  Assessment & Plan  3/22/24: Presented to [facility] in rapid atrial flutter, given adenosine with conversion to sinus, but then had VT which required shock. Likely due to cardiac ischemia. In cath lab, given amiodarone 150mg x2, metoprolol 2.5mg IV, magnesium    Plan:    Amiodarone vs lidocaine injection for recurrent episode  Optimize electrolyte: K > 4.0, mag >2.0   Amiodarone 1mg/min  Continue AV gabo blockers:  Metoprolol succinate 25mg q24  Monitor telemetry       Atrial flutter (HCC)  Assessment & Plan  3/22/24: Presented to [facility] in rapid atrial flutter, given adenosine with conversion. Atrial flutter again in cath lab, given amiodarone 150mg x2, metoprolol 2.5mg IV, magnesium with conversion   Currently NSR     Plan:  Goal HR < 110   Amiodarone 1mg/min   Continue AV gabo blockers:  Metoprolol succinate 25mg q24  Optimize electrolytes: K > 4.0, mag > 2.0   Systemic anticoagulation: None   Start if recurrent a fib/flutter   Continue to monitor telemetry     Lactic acidosis  Assessment & Plan  Suspect secondary to STEMI with possible component of cardiogenic shock, which is resolving s/p PCI  3/22/24 initial lactate: 3.0    Plan:   Repeat lactate  Monitor clinical exam and other endpoints of resuscitation   Follow up echocardiogram   Continue supportive care       Hyperlipemia  Assessment & Plan  Lab Results   Component Value Date    CHOLESTEROL 323 (H) 03/22/2024    TRIG 185 (H) 03/22/2024    HDL 50 03/22/2024    LDLCALC 236 (H) 03/22/2024    NONHDLC 273 03/22/2024      Plan:   Atorvastatin 80mg qHS    Dietary and lifestyle changes       Tobacco abuse  Assessment & Plan  Patient currently smokes 1 pack per day. Unknown duration     Plan:   Patient declined nicotine patch at this time   Provide cessation education            History of Present Illness     HPI: Vesna Langston is a 63 y.o. with chronic low back pain, hypoglycemia, and tobacco abuse returned home from an SI joint injection when she had a syncopal event without prodrome. She does not know how long she was down, but was able to call her son and get to her dining room table where she synopsized again. Her son arrived to check her blood sugar, which was not low, and she had two more syncopal events. She also describes  chest pressure and nausea without vomiting. EMS arrived and she presented to the ED at [facility] on 3/22/24 in rapid atrial flutter for which she was given adenosine. She briefly converted to sinus and then had sustained VT and received shock x1. She then had lateral wall ST elevations and was flown to [facility] for cardiac catheterization. Her LHC revealed a heavily diseased LAD and she received NATHAN x2. She was noted in the cath lab to be in atrial flutter, and she was given amiodarone 150mg x2, metoprolol 2.5mg x1, and magnesium with conversion to NSR. She also had an LVEDP of 35mmHg and was given 40mg IV lasix. She arrived to the ICU in NSR, but was noted to be hypoxic to low 80's on room air and was started on nasal cannula supplemental oxygen.     History obtained from chart review and the patient.    Review of Systems   Constitutional:  Positive for fatigue.   HENT:  Positive for sinus pressure.    Eyes: Negative.    Respiratory:  Positive for cough. Negative for shortness of breath.    Cardiovascular:  Negative for chest pain, palpitations and leg swelling.   Gastrointestinal:  Positive for nausea. Negative for abdominal pain, diarrhea and vomiting.   Endocrine: Negative.    Genitourinary: Negative.    Musculoskeletal:  Positive for back pain (Chronic).   Skin: Negative.    Allergic/Immunologic: Negative.    Neurological: Negative.  Negative for dizziness, numbness and headaches.   Hematological: Negative.    Psychiatric/Behavioral: Negative.         Disposition: Critical care   Historical Information   Past Medical History:  No date: Allergic  No date: Hypoglycemia Past Surgical History:  No date: ADENOIDECTOMY  No date:  SECTION  No date: ECTOPIC PREGNANCY SURGERY  No date: SEPTOPLASTY  No date: TONSILLECTOMY   Current Outpatient Medications   Medication Instructions    albuterol (PROAIR HFA) 90 mcg/act inhaler 2 puffs, Inhalation, Every 6 hours PRN    Aspirin Buf,CaCarb-MgCarb-MgO, 81 MG TABS Take by mouth     aspirin 81 mg, Daily    diphenhydrAMINE (BENADRYL) 50 mg, Oral, Every 6 hours PRN    meclizine (ANTIVERT) 25 mg, Oral, Every 8 hours PRN    Multiple Vitamin (MULTIVITAMIN) capsule 1 capsule, Oral, Daily    predniSONE 10 mg tablet Take once daily all days pills on this schedule 6- 6- 5- 4- 3- 2- 1    ranitidine (ZANTAC) 150 mg, Oral, As needed    Allergies   Allergen Reactions    Shellfish-Derived Products - Food Allergy Anaphylaxis    Azithromycin Rash      Social History     Tobacco Use    Smoking status: Every Day     Current packs/day: 1.00     Types: Cigarettes    Smokeless tobacco: Never   Substance Use Topics    Alcohol use: Yes    Drug use: Not Currently    Family History   Problem Relation Age of Onset    Depression Mother     Heart disease Father         Objective                            Vitals I/O      Most Recent Min/Max in 24hrs   Temp   Temp  Min: 97.8 °F (36.6 °C)  Max: 97.8 °F (36.6 °C)   Pulse   Pulse  Min: 96  Max: 189   Resp   Resp  Min: 17  Max: 23   BP   BP  Min: 123/87  Max: 154/114   O2 Sat   SpO2  Min: 90 %  Max: 98 %      Intake/Output Summary (Last 24 hours) at 3/22/2024 2316  Last data filed at 3/22/2024 2142  Gross per 24 hour   Intake --   Output 175 ml   Net -175 ml       Diet Cardiovascular; Cardiac    Invasive Monitoring           Physical Exam   Physical Exam  Vitals and nursing note reviewed.   Eyes:      General: No scleral icterus.     Pupils: Pupils are equal, round, and reactive to light.   Skin:     General: Skin is warm and dry.      Capillary Refill: Capillary refill takes less than 2 seconds.          HENT:      Head: Normocephalic and atraumatic.      Mouth/Throat:      Lips: Pink.      Mouth: Mucous membranes are dry.   Cardiovascular:      Rate and Rhythm: Normal rate and regular rhythm.      Pulses:           Radial pulses are 0 on the right side and 2+ on the left side.        Dorsalis pedis pulses are 2+ on the right side and 2+ on the left side.      Heart sounds:  Normal heart sounds.      Comments: TR band in place to right wrist   Musculoskeletal:      Cervical back: Full passive range of motion without pain.      Right lower leg: No edema.      Left lower leg: No edema.   Abdominal: General: Abdomen is protuberant. There is no distension.      Palpations: Abdomen is soft.      Tenderness: There is no abdominal tenderness.   Constitutional:       General: She is awake. She is not in acute distress.     Appearance: She is well-developed.      Interventions: Nasal cannula in place.   Pulmonary:      Effort: Tachypnea present.      Breath sounds: Examination of the right-lower field reveals decreased breath sounds. Examination of the left-lower field reveals decreased breath sounds. Decreased breath sounds present. No wheezing or rhonchi.   Psychiatric:         Behavior: Behavior is cooperative.   Neurological:      General: No focal deficit present.      Mental Status: She is alert.      GCS: GCS eye subscore is 4. GCS verbal subscore is 5. GCS motor subscore is 6.            Diagnostic Studies      EKG: NSR   Imaging: CXR: no marked effusion, bilateral mild pulmonary edema, no significant area of consolidation I have personally reviewed pertinent films in PACS     Medications:  Scheduled PRN   [START ON 3/23/2024] aspirin, 81 mg, Daily  atorvastatin, 80 mg, QPM  [START ON 3/23/2024] furosemide, 40 mg, BID (diuretic)  [START ON 3/23/2024] metoprolol succinate, 25 mg, Daily  ticagrelor, 90 mg, BID      acetaminophen, 650 mg, Q4H PRN       Continuous    amiodarone (CORDARONE) 900 mg in dextrose 5 % 500 mL infusion, 1 mg/min, Last Rate: 1 mg/min (03/22/24 2026)  sodium chloride, 50 mL/hr         Labs:    CBC    Recent Labs     03/22/24  1822   WBC 8.94   HGB 16.0*   HCT 48.3*        BMP    Recent Labs     03/22/24  1822   SODIUM 134*   K 4.1      CO2 19*   AGAP 12   BUN 14   CREATININE 0.78   CALCIUM 9.7       Coags    No recent results     Additional  Electrolytes  Recent Labs     03/22/24  1822   MG 2.9*          Blood Gas    No recent results  No recent results LFTs  Recent Labs     03/22/24  1822   ALT 56*   AST 55*   ALKPHOS 49   ALB 4.4   TBILI 0.64       Infectious  No recent results  Glucose  Recent Labs     03/22/24  1822   GLUC 355*               MAHI Ferrer

## 2024-03-23 NOTE — ASSESSMENT & PLAN NOTE
Lab Results   Component Value Date    CHOLESTEROL 323 (H) 03/22/2024    TRIG 185 (H) 03/22/2024    HDL 50 03/22/2024    LDLCALC 236 (H) 03/22/2024    NONHDLC 273 03/22/2024      Plan:   Atorvastatin 80mg qHS    Dietary and lifestyle changes

## 2024-03-23 NOTE — ASSESSMENT & PLAN NOTE
3/22/24: Presented to [facility] after 4 syncopal events at home with chest pressure and nausea. Initial EKG with lateral ST elevations.   3/22/24 LHC: 100% ostial LAD occlusion, possibly chronic versus acute. NATHAN x1. LVEDP 35 mmHg    Plan:  ASA 81mg daily   S/p 324mg loading dose (3/22/24)   Brilinta 90mg q12h   S/p 180mg loading dose (3/22/24)   Atorvastatin 80mg qHS   Metoprolol succiante 25mg q24h   Check echocardiogram   Continue to trend troponins to peak   Continue close telemetry monitoring for arrhythmias   Maintain K > 4.0, Mag > 2.0   Appreciate cardiology recommendations and follow up

## 2024-03-23 NOTE — ASSESSMENT & PLAN NOTE
Lab Results   Component Value Date    HGBA1C 7.9 (H) 03/22/2024     Plan:   Continue AC/HS sliding scale algorithm   Adjust insulin regimen as needed to maintain goal -180  Carb controlled diet   Consider endocrinology consultation on transfer to provide education and establish long-term plan

## 2024-03-23 NOTE — ASSESSMENT & PLAN NOTE
3/22/24: Presented to [facility] in rapid atrial flutter, given adenosine with conversion. Atrial flutter again in cath lab, given amiodarone 150mg x2, metoprolol 2.5mg IV, magnesium with conversion   Currently NSR     Plan:  Goal HR < 110   Amiodarone 1mg/min   Continue AV gabo blockers:  Metoprolol succinate 25mg q24  Optimize electrolytes: K > 4.0, mag > 2.0   Systemic anticoagulation: None   Start if recurrent a fib/flutter   Continue to monitor telemetry

## 2024-03-23 NOTE — ASSESSMENT & PLAN NOTE
3/22/24: Presented to ICU from cath lab with SpO2 low 80's on room air. Initiated supplemental oxygen with improvement. Suspect new cardiomyopathy with some element of volume overload. Given 40mg IV lasix prior to arrival     Plan:   Continue nasal cannula   Wean supplemental oxygen as able to maintain SpO2 > 90%   Continue diuresis as tolerated   Lasix 40mg BID   Continue pulmonary hygiene. Incentive spirometer q1h while awake, encourage coughing and deep breathing. Upright positioning  Suction as needed and closely monitor secretions. Maintain HOB >30 degrees. Q4h oral care with chlorhexidine BID

## 2024-03-23 NOTE — ASSESSMENT & PLAN NOTE
3/22/24: Presented to [facility] in rapid atrial flutter, given adenosine with conversion. Atrial flutter again in cath lab, given amiodarone 150mg x2, metoprolol 2.5mg IV, magnesium with conversion   Currently NSR     Plan:  Goal HR < 110   Amiodarone 0.5 mg/min   Continue AV gabo blockers:  Metoprolol succinate 25mg q24  Optimize electrolytes: K > 4.0, mag > 2.0   Systemic anticoagulation: None   Start if recurrent a fib/flutter   Continue to monitor telemetry

## 2024-03-23 NOTE — UTILIZATION REVIEW
Initial Clinical Review    Admission: Date/Time/Statement:   Admission Orders (From admission, onward)       Ordered        03/22/24 2056  Inpatient Admission  Once                          Orders Placed This Encounter   Procedures    Inpatient Admission     Standing Status:   Standing     Number of Occurrences:   1     Order Specific Question:   Level of Care     Answer:   Critical Care [15]     Order Specific Question:   Estimated length of stay     Answer:   More than 2 Midnights     Order Specific Question:   Certification     Answer:   I certify that inpatient services are medically necessary for this patient for a duration of greater than two midnights. See H&P and MD Progress Notes for additional information about the patient's course of treatment.     ED Arrival Information       Patient not seen in ED                         Initial Presentation: 63 y.o. female pmh recent SI joint injection w 3 episodes of syncope wo prodrome, [facility] EMS brought patient to [facility] ED in AFlutter, s/p Adenosine; when arriving to ED patient  received Cardioversion into unstable arrhythmia VTach , EKG after showed lateral STEMI Life Flighted to [facility] ICU  Inpatient admission due to STEMI, Ventricular tachycardia, atrial flutter. PLAN S/p PCI to pLAD. Patient had a very complex lesion. LVEDP  elevated at 35 & started on amiodarone in the Cath Lab once patient converted to NSR. cont IV Amiodarone, DAPT w ASA/ Brilinta, statin; obtain A1c & lipid panel. Start Lasix BID, ECHO, [facility] [place] for POX > 90%. Consult Critical Care  Critical care  Currently NSR , Presented to ICU from cath lab with SpO2 low 80's on room air. Initiated supplemental oxygen with improvement. Suspect new cardiomyopathy with some element of volume overload. Given 40mg IV lasix prior to arrival. CXR, cont diuresis, incentive spirometry. Maintain HOB > 30 degrees w Q 4HR oral care. Cont Cardiology recs, replete & monitor electrolytes. Amiodarone vs lidocaine  injection for recurrent episode. Cont AV gabo blockers. Provide tobacco cessation education  Date: 3/23   Day 2: SD2 ICU  No episodes of NSVT or atrial fib/flutter overnight. Remains on supplemental oxygen with ongoing UOP following procedural lasix   Heart Failure  Fellow  NSR, rales; no LE edema. Continue amiodarone for now, can transition to oral amiodarone. DAPT, statin  A1c and lipid panel pending & TTE pending  Good urine output with 40 of IV Lasix, has put out 1.5 L, will continue IV Lasix 40 mg twice daily for today  Monitor Daily weights, strict BRANDO'  ED Triage Vitals   Temperature Pulse Respirations Blood Pressure SpO2   03/22/24 2200 03/22/24 2200 03/22/24 2200 03/22/24 2200 03/22/24 2200   97.8 °F (36.6 °C) 96 19 125/86 90 %      Temp Source Heart Rate Source Patient Position - Orthostatic VS BP Location FiO2 (%)   03/22/24 2200 03/22/24 2200 03/23/24 0700 03/23/24 0700 --   Oral Monitor Lying Left arm       Pain Score       03/22/24 2200       No Pain          Wt Readings from Last 1 Encounters:   03/23/24 96.6 kg (213 lb)     Additional Vital Signs:   Date/Time Temp Pulse Resp BP MAP (mmHg) SpO2 Calculated FIO2 (%) - Nasal Cannula Nasal Cannula O2 Flow Rate (L/min) O2 Device Patient Position - Orthostatic VS   03/23/24 1200 97.6 °F (36.4 °C) 96 24 Abnormal  106/66 82 97 % -- -- None (Room air) Lying   03/23/24 1100 -- 95 31 Abnormal  116/71 88 96 % -- -- None (Room air) Lying   03/23/24 1033 -- 95 -- 116/71 -- -- -- -- -- --   03/23/24 1000 -- 97 19 100/67 79 98 % -- -- None (Room air) Lying   03/23/24 0939 -- 97 18 109/61 80 98 % -- -- None (Room air) Lying   03/23/24 0800 97.8 °F (36.6 °C) 96 25 Abnormal  104/75 85 97 % -- -- None (Room air) Sitting   03/23/24 0700 -- 93 32 Abnormal  103/60 77 95 % 32 3 L/min Nasal cannula Lying   03/23/24 0600 -- 95 21 116/60 80 95 % -- -- -- --   03/23/24 0500 -- 94 21 106/64 81 95 % -- -- -- --   03/23/24 0400 -- 95 17 114/63 83 95 % -- -- -- --   03/23/24 0300  "-- 92 15 108/57 77 97 % -- -- -- --   03/23/24 0200 -- 94 15 91/53 67 96 % -- -- -- --   03/23/24 0107 -- 94 13 105/59 77 94 % 40 5 L/min Nasal cannula --   03/23/24 0100 -- 92 20 -- -- 94 % -- -- -- --   03/22/24 2200 97.8 °F (36.6 °C) 96 19 125/86 101 90 % 40 5 L/min Nasal cannula --     Weights (last 14 days)      Date/Time Weight Weight Method Height   03/23/24 1033 96.6 kg (213 lb) -- 5' 8\" (1.727 m)   03/23/24 0555 97 kg (213 lb 13.5 oz) Bed scale      Pertinent Labs/Diagnostic Test Results:   3/23 ECHO done results unavailable    3/22 EKG=  Normal sinus rhythm  Biatrial enlargement  Rightward axis  Low voltage QRS  Septal infarct (cited on or before 22-MAR-2024)  Abnormal ECG  3/22 EKG=  ate:     ECG rate:  191     ECG rate assessment: tachycardic    Rhythm:     Rhythm: ventricular tachycardia    Ectopy:     Ectopy: none    QRS:     QRS axis:  Normal     QRS intervals:  Normal   Conduction:     Conduction: normal    ST segments:     ST segments:  Normal   T waves:     T waves: normal    Comments:      Following defibrillation, repeat EKG at 6:12 PM and 6:16 PM and 6:25 PM   showed rhythms alternating between sinus tachycardia and atrial flutter   with rates between 110-136 and with ST elevation in leads V2 through V4.     3/22 L heart Cath=  Impression  Ost LAD to Prox LAD lesion is 100% stenosed.  Single-vessel coronary disease.  There was an ostial total occlusion of the LAD.  There were no other significant lesions.  The patient's clinical presentation with recurrent syncope and VT, no chest discomfort, the marked difficulty crossing the total occlusion with a wire, and the resistance to opening with balloon dilation or point toward a chronic total occlusion rather than acute thrombosis.  Successful IVUS-guided PCI, ostial/proximal LAD, with reduction in stenosis from 100% with MANNY 0 flow to 0% with MANNY-3 flow following pre-dilation, placement of a 2.5x48mm NATHAN, proximal post-dilation with a 3.0mm NC " "balloon, and distal injection of adenosine via microcatheter to treat slow distal flow.  Markedly elevated LVEDP (35 mmHg), measured following PCI.       XR chest portable ICU    (Results Pending)         Results from last 7 days   Lab Units 03/23/24  0549 03/22/24  1822   WBC Thousand/uL 18.62* 8.94   HEMOGLOBIN g/dL 15.0 16.0*   HEMATOCRIT % 45.9 48.3*   PLATELETS Thousands/uL 326 306   NEUTROS ABS Thousands/µL 15.97* 7.62         Results from last 7 days   Lab Units 03/23/24  0549 03/22/24  1822   SODIUM mmol/L 134* 134*   POTASSIUM mmol/L 4.9 4.1   CHLORIDE mmol/L 104 103   CO2 mmol/L 20* 19*   ANION GAP mmol/L 10 12   BUN mg/dL 15 14   CREATININE mg/dL 0.69 0.78   EGFR ml/min/1.73sq m 91 80   CALCIUM mg/dL 8.8 9.7   MAGNESIUM mg/dL 2.1 2.9*     Results from last 7 days   Lab Units 03/23/24  0549 03/22/24  1822   AST U/L 52* 55*   ALT U/L 44 56*   ALK PHOS U/L 44 49   TOTAL PROTEIN g/dL 6.7 7.1   ALBUMIN g/dL 4.0 4.4   TOTAL BILIRUBIN mg/dL 0.76 0.64   BILIRUBIN DIRECT mg/dL 0.06  --      Results from last 7 days   Lab Units 03/23/24  1117 03/23/24  0620 03/23/24  0041   POC GLUCOSE mg/dl 281* 241* 336*     Results from last 7 days   Lab Units 03/23/24  0549 03/22/24  1822   GLUCOSE RANDOM mg/dL 258* 355*         Results from last 7 days   Lab Units 03/22/24  2156   HEMOGLOBIN A1C % 7.9*   EAG mg/dl 180     No results found for: \"BETA-HYDROXYBUTYRATE\"                   Results from last 7 days   Lab Units 03/23/24  0225 03/23/24  0029 03/22/24  2156 03/22/24  1822   HS TNI 0HR ng/L  --   --  930* 20   HS TNI 2HR ng/L  --  1,266*  --   --    HSTNI D2 ng/L  --  336*  --   --    HS TNI 4HR ng/L 1,381*  --   --   --    HSTNI D4 ng/L 451*  --   --   --                      Results from last 7 days   Lab Units 03/23/24  0940 03/23/24  0549 03/23/24  0029 03/22/24  2156 03/22/24  1822   LACTIC ACID mmol/L 2.8* 2.5* 3.0* 2.6* 3.0*             Results from last 7 days   Lab Units 03/22/24  1822   BNP pg/mL 447*       ED " Treatment:   Medication Administration - No Administrations Displayed (No Start Event Found)       None          Past Medical History:   Diagnosis Date    Allergic     Hypoglycemia      Present on Admission:   ST elevation myocardial infarction involving left anterior descending (LAD) coronary artery ([facility])   Atrial flutter (HCC)   VT (ventricular tachycardia) ([facility])   Hyperlipemia   New onset type 2 diabetes mellitus ([facility])   Acute respiratory insufficiency   Tobacco abuse   Lactic acidosis      Admitting Diagnosis: STEMI (ST elevation myocardial infarction) ([facility]) [I21.3]  Age/Sex: 63 y.o. female  Admission Orders:  L heart cath  Telemetry  Echo  I/O  Daily WT  Cardiac diet  SCD    Scheduled Medications:  apixaban, 5 mg, Oral, BID  aspirin, 81 mg, Oral, Daily  atorvastatin, 80 mg, Oral, QPM  clopidogrel, 600 mg, Oral, Once   Followed by  [START ON 3/24/2024] clopidogrel, 75 mg, Oral, Daily  furosemide, 40 mg, Intravenous, BID (diuretic)  gabapentin, 100 mg, Oral, BID  heparin (porcine), 5,000 Units, Subcutaneous, Q8H MONA  insulin lispro, 1-5 Units, Subcutaneous, HS  insulin lispro, 1-6 Units, Subcutaneous, TID AC  metoprolol succinate, 25 mg, Oral, Daily    Continuous IV Infusions:  amiodarone (CORDARONE) 900 mg in dextrose 5 % 500 mL infusion, 0.5 mg/min, Intravenous, Continuous      PRN Meds:  acetaminophen, 650 mg, Oral, Q4H PRN        IP CONSULT TO ELECTROPHYSIOLOGY    Network Utilization Review Department  ATTENTION: Please call with any questions or concerns to [phone] and carefully listen to the prompts so that you are directed to the right person. All voicemails are confidential.   For Discharge needs, contact Care Management DC Support Team at [phone] opt. 2  Send all requests for admission clinical reviews, approved or denied determinations and any other requests to dedicated fax number below belonging to the campus where the patient is receiving treatment. List of dedicated fax numbers for the  Facilities:  FACILITY NAME UR FAX NUMBER   ADMISSION DENIALS (Administrative/Medical Necessity) [phone]   DISCHARGE SUPPORT TEAM (NETWORK) [phone]   PARENT CHILD HEALTH (Maternity/NICU/Pediatrics) [phone]   [facility] [phone]   [facility] [phone]   [facility] [phone]   [facility] [phone]   [facility] [phone]   [facility] [phone]   [facility] [phone]   [facility] [phone]   [facility] [phone]   [facility] [phone]   [facility] [phone]   [facility] [phone]

## 2024-03-24 PROBLEM — I25.5 ISCHEMIC CARDIOMYOPATHY: Status: ACTIVE | Noted: 2024-03-24

## 2024-03-24 LAB
ANION GAP SERPL CALCULATED.3IONS-SCNC: 11 MMOL/L (ref 4–13)
ANION GAP SERPL CALCULATED.3IONS-SCNC: 9 MMOL/L (ref 4–13)
ATRIAL RATE: 110 BPM
ATRIAL RATE: 118 BPM
ATRIAL RATE: 293 BPM
BUN SERPL-MCNC: 20 MG/DL (ref 5–25)
BUN SERPL-MCNC: 22 MG/DL (ref 5–25)
CA-I BLD-SCNC: 1.11 MMOL/L (ref 1.12–1.32)
CALCIUM SERPL-MCNC: 9 MG/DL (ref 8.4–10.2)
CALCIUM SERPL-MCNC: 9.2 MG/DL (ref 8.4–10.2)
CHLORIDE SERPL-SCNC: 101 MMOL/L (ref 96–108)
CHLORIDE SERPL-SCNC: 102 MMOL/L (ref 96–108)
CO2 SERPL-SCNC: 22 MMOL/L (ref 21–32)
CO2 SERPL-SCNC: 24 MMOL/L (ref 21–32)
CREAT SERPL-MCNC: 0.81 MG/DL (ref 0.6–1.3)
CREAT SERPL-MCNC: 0.83 MG/DL (ref 0.6–1.3)
ERYTHROCYTE [DISTWIDTH] IN BLOOD BY AUTOMATED COUNT: 14.3 % (ref 11.6–15.1)
GFR SERPL CREATININE-BSD FRML MDRD: 74 ML/MIN/1.73SQ M
GFR SERPL CREATININE-BSD FRML MDRD: 76 ML/MIN/1.73SQ M
GLUCOSE SERPL-MCNC: 133 MG/DL (ref 65–140)
GLUCOSE SERPL-MCNC: 167 MG/DL (ref 65–140)
GLUCOSE SERPL-MCNC: 175 MG/DL (ref 65–140)
GLUCOSE SERPL-MCNC: 181 MG/DL (ref 65–140)
GLUCOSE SERPL-MCNC: 188 MG/DL (ref 65–140)
GLUCOSE SERPL-MCNC: 193 MG/DL (ref 65–140)
HCT VFR BLD AUTO: 43.9 % (ref 34.8–46.1)
HGB BLD-MCNC: 14.3 G/DL (ref 11.5–15.4)
MAGNESIUM SERPL-MCNC: 2 MG/DL (ref 1.9–2.7)
MAGNESIUM SERPL-MCNC: 2.1 MG/DL (ref 1.9–2.7)
MCH RBC QN AUTO: 29.6 PG (ref 26.8–34.3)
MCHC RBC AUTO-ENTMCNC: 32.6 G/DL (ref 31.4–37.4)
MCV RBC AUTO: 91 FL (ref 82–98)
P AXIS: 75 DEGREES
P AXIS: 78 DEGREES
PLATELET # BLD AUTO: 331 THOUSANDS/UL (ref 149–390)
PMV BLD AUTO: 10.2 FL (ref 8.9–12.7)
POTASSIUM SERPL-SCNC: 4 MMOL/L (ref 3.5–5.3)
POTASSIUM SERPL-SCNC: 4.1 MMOL/L (ref 3.5–5.3)
PR INTERVAL: 154 MS
PR INTERVAL: 156 MS
QRS AXIS: -85 DEGREES
QRS AXIS: 77 DEGREES
QRS AXIS: 86 DEGREES
QRS AXIS: 87 DEGREES
QRSD INTERVAL: 110 MS
QRSD INTERVAL: 80 MS
QRSD INTERVAL: 82 MS
QRSD INTERVAL: 84 MS
QT INTERVAL: 260 MS
QT INTERVAL: 308 MS
QT INTERVAL: 334 MS
QT INTERVAL: 342 MS
QTC INTERVAL: 431 MS
QTC INTERVAL: 462 MS
QTC INTERVAL: 463 MS
QTC INTERVAL: 478 MS
RBC # BLD AUTO: 4.83 MILLION/UL (ref 3.81–5.12)
SODIUM SERPL-SCNC: 134 MMOL/L (ref 135–147)
SODIUM SERPL-SCNC: 135 MMOL/L (ref 135–147)
T WAVE AXIS: 70 DEGREES
T WAVE AXIS: 76 DEGREES
T WAVE AXIS: 82 DEGREES
T WAVE AXIS: 91 DEGREES
VENTRICULAR RATE: 110 BPM
VENTRICULAR RATE: 118 BPM
VENTRICULAR RATE: 123 BPM
VENTRICULAR RATE: 191 BPM
WBC # BLD AUTO: 17.66 THOUSAND/UL (ref 4.31–10.16)

## 2024-03-24 PROCEDURE — 82330 ASSAY OF CALCIUM: CPT | Performed by: INTERNAL MEDICINE

## 2024-03-24 PROCEDURE — 85027 COMPLETE CBC AUTOMATED: CPT | Performed by: INTERNAL MEDICINE

## 2024-03-24 PROCEDURE — 82948 REAGENT STRIP/BLOOD GLUCOSE: CPT

## 2024-03-24 PROCEDURE — 93010 ELECTROCARDIOGRAM REPORT: CPT | Performed by: INTERNAL MEDICINE

## 2024-03-24 PROCEDURE — NC001 PR NO CHARGE: Performed by: INTERNAL MEDICINE

## 2024-03-24 PROCEDURE — 80048 BASIC METABOLIC PNL TOTAL CA: CPT | Performed by: INTERNAL MEDICINE

## 2024-03-24 PROCEDURE — 83735 ASSAY OF MAGNESIUM: CPT | Performed by: INTERNAL MEDICINE

## 2024-03-24 PROCEDURE — 99232 SBSQ HOSP IP/OBS MODERATE 35: CPT | Performed by: INTERNAL MEDICINE

## 2024-03-24 PROCEDURE — 99223 1ST HOSP IP/OBS HIGH 75: CPT | Performed by: INTERNAL MEDICINE

## 2024-03-24 RX ORDER — FUROSEMIDE 10 MG/ML
40 INJECTION INTRAMUSCULAR; INTRAVENOUS
Status: COMPLETED | OUTPATIENT
Start: 2024-03-24 | End: 2024-03-25

## 2024-03-24 RX ORDER — LOSARTAN POTASSIUM 25 MG/1
25 TABLET ORAL DAILY
Status: DISCONTINUED | OUTPATIENT
Start: 2024-03-24 | End: 2024-03-25

## 2024-03-24 RX ADMIN — INSULIN LISPRO 1 UNITS: 100 INJECTION, SOLUTION INTRAVENOUS; SUBCUTANEOUS at 16:10

## 2024-03-24 RX ADMIN — INSULIN LISPRO 2 UNITS: 100 INJECTION, SOLUTION INTRAVENOUS; SUBCUTANEOUS at 07:44

## 2024-03-24 RX ADMIN — ACETAMINOPHEN 650 MG: 325 TABLET, FILM COATED ORAL at 02:53

## 2024-03-24 RX ADMIN — ATORVASTATIN CALCIUM 80 MG: 80 TABLET, FILM COATED ORAL at 17:18

## 2024-03-24 RX ADMIN — GABAPENTIN 100 MG: 100 CAPSULE ORAL at 17:18

## 2024-03-24 RX ADMIN — ASPIRIN 81 MG CHEWABLE TABLET 81 MG: 81 TABLET CHEWABLE at 08:09

## 2024-03-24 RX ADMIN — LOSARTAN POTASSIUM 25 MG: 25 TABLET, FILM COATED ORAL at 11:02

## 2024-03-24 RX ADMIN — FUROSEMIDE 40 MG: 10 INJECTION, SOLUTION INTRAMUSCULAR; INTRAVENOUS at 17:51

## 2024-03-24 RX ADMIN — CLOPIDOGREL BISULFATE 75 MG: 75 TABLET ORAL at 08:09

## 2024-03-24 RX ADMIN — GABAPENTIN 100 MG: 100 CAPSULE ORAL at 08:09

## 2024-03-24 RX ADMIN — NICOTINE 21 MG: 21 PATCH, EXTENDED RELEASE TRANSDERMAL at 21:29

## 2024-03-24 RX ADMIN — METOPROLOL TARTRATE 5 MG: 5 INJECTION INTRAVENOUS at 00:18

## 2024-03-24 RX ADMIN — APIXABAN 5 MG: 5 TABLET, FILM COATED ORAL at 17:17

## 2024-03-24 RX ADMIN — APIXABAN 5 MG: 5 TABLET, FILM COATED ORAL at 08:09

## 2024-03-24 RX ADMIN — INSULIN LISPRO 1 UNITS: 100 INJECTION, SOLUTION INTRAVENOUS; SUBCUTANEOUS at 11:03

## 2024-03-24 RX ADMIN — HEPARIN SODIUM 5000 UNITS: 5000 INJECTION INTRAVENOUS; SUBCUTANEOUS at 06:00

## 2024-03-24 RX ADMIN — INSULIN LISPRO 1 UNITS: 100 INJECTION, SOLUTION INTRAVENOUS; SUBCUTANEOUS at 21:28

## 2024-03-24 RX ADMIN — ACETAMINOPHEN 650 MG: 325 TABLET, FILM COATED ORAL at 16:10

## 2024-03-24 RX ADMIN — HEPARIN SODIUM 5000 UNITS: 5000 INJECTION INTRAVENOUS; SUBCUTANEOUS at 13:57

## 2024-03-24 RX ADMIN — METOPROLOL SUCCINATE 50 MG: 50 TABLET, EXTENDED RELEASE ORAL at 08:09

## 2024-03-24 RX ADMIN — HEPARIN SODIUM 5000 UNITS: 5000 INJECTION INTRAVENOUS; SUBCUTANEOUS at 21:35

## 2024-03-24 RX ADMIN — FUROSEMIDE 40 MG: 10 INJECTION, SOLUTION INTRAMUSCULAR; INTRAVENOUS at 08:09

## 2024-03-24 NOTE — PROGRESS NOTES
Cardiology Progress Note - Vesna Langston 63 y.o. female MRN: [id number]    Unit/Bed#: [facility] 506-01 Encounter: [id number]      Assessment:  Principal Problem:    ST elevation myocardial infarction involving left anterior descending (LAD) coronary artery (HCC)  Active Problems:    Atrial flutter (HCC)    VT (ventricular tachycardia) (HCC)    Hyperlipemia    New onset type 2 diabetes mellitus (HCC)    Acute respiratory insufficiency    Tobacco abuse    Lactic acidosis    Vesna Langston is a 63 y.o. year old female with no significant cardiac history presented after having multiple syncopal episodes.  At the time of presentation to [facility], patient was found to be in a flutter which was broken with adenosine.  Patient went into ventricular tachycardia shortly after requiring shock, subsequent EKG showed possible lateral STEMI, patient was taken to [facility] Cath Lab.  Patient had heavily calcified ostial LAD lesion.  Patient underwent successful PCI of the lesion.  Post stenting IVUS was done showing good opposition.  Intracoronary adenosine was given as well due to initial low flow.  LVEDP was elevated at 35.        STEMI.    Likely  of the ostial LAD given heavy calcification and difficulty passing lesion during PCI.    Patient required intracoronary adenosine for low flow after stenting.    Status post  2.5 x 48 mm drug-eluting stent placed from the ostial LAD down.    LV EDP was elevated to 35.  Medication regimen: Currently on triple therapy with aspirin, clopidogrel, apixaban [for atrial flutter].  Also on atorvastatin, Toprol-XL 50 mg daily    Ventricular Tachycardia.   Ischemia versus scar related  Transthoracic echo shows an EF of 35%, with severe hypokinesis of the LAD territory  Currently on IV amiodarone gtt.  No further episodes of VT/NSVT since reperfusion  EP consulted for possible ICD placement     Acute on chronic CHF, HFrEF, EF 35%  LVEDP 35 on presentation in left heart catheterization  Currently on IV Lasix 40 mg  "twice daily  Urine output 5.3 with a -3.9 L fluid balance  Weight is decreasing 213 pounds [bed scale]--> 205 pounds [standing scale]  Creatinine stable at 0.8, potassium 4, magnesium 2.1  Lactic acidosis has now resolved  Currently examines volume up with bilateral Rales, lower extremity swelling, JVD    Cardiomyopathy, likely ischemic  Transthoracic echo with EF 35%  Severe hypokinesis of the LAD territory  Current GDMT:   Beta-blocker: Toprol-XL 50 mg daily  ACE/ARB/ARNI: None  SGLT2 inhibitors: None  MRA: None  ICD/LifeVest: EF 35%, will need a LifeVest prior to discharge if patient does not get an ICD during current admission.  Patient's V. tach arrest could likely be scar mediated as opposed to ischemia related.  EP is consulted.  QRS duration 80 ms    Moderate MR    Atrial flutter.   Noted be in 2:1 atrial flutter  Patient was started on amiodarone in the Cath Lab once patient converted to NSR.  Medications: IV amiodarone drip, p.o. Toprol-XL 50 once daily  Anticoagulation: Eliquis    Type II DM. A1c of 7.9  Hyperlipidemia.      Plan:  Continue amiodarone for now  Continue triple therapy with aspirin, Plavix, apixaban for 1 week.  Then will continue with Plavix 75 mg daily and add apixaban 5 mg twice daily  Continue atorvastatin 80 mg daily  Continue Toprol-XL 50 mg daily  Patient continues to examine volume overloaded, continue with IV Lasix 40 mg twice daily  Will continue to add GDMT  Will price check Eliquis and Entresto  EP consulted for consideration of ICD  May need a cMRI, however would wait until patient is euvolemic  Patient requested nicotine patches on discharge  Dietitian consult    Subjective:   Patient seen and examined.  No significant events overnight.  Denies chest pain, palpitation.  Has dyspnea on exertion.    Objective:     Vitals: Blood pressure 111/70, pulse 86, temperature 98.7 °F (37.1 °C), temperature source Oral, resp. rate 18, height 5' 8\" (1.727 m), weight 93.1 kg (205 lb 4 oz), " SpO2 97%., Body mass index is 31.21 kg/m².,   Orthostatic Blood Pressures      Flowsheet Row Most Recent Value   Blood Pressure 111/70 filed at 03/24/2024 0704   Patient Position - Orthostatic VS Lying filed at 03/24/2024 0704              Intake/Output Summary (Last 24 hours) at 3/24/2024 1021  Last data filed at 3/24/2024 0911  Gross per 24 hour   Intake 1267.68 ml   Output 5425 ml   Net -4157.32 ml     Telemetry: Atrial flutter.  No NSVT.      Physical Exam:    GEN: Vesna Langston appears well, alert and oriented x 3, pleasant and cooperative   HEENT: pupils equal, round, and reactive to light; extraocular muscles intact  NECK: Unable to appreciate JVD  HEART: regular rhythm, normal S1 and S2, systolic murmur+, clicks, gallops or rubs   LUNGS: Bilateral Rales  ABDOMEN: normal bowel sounds, soft, no tenderness, no distention  EXTREMITIES: 2+ pitting edema bilateral lower extremities, warm peripheries  NEURO: no focal findings   SKIN: normal without suspicious lesions on exposed skin    Medications:      Current Facility-Administered Medications:     acetaminophen (TYLENOL) tablet 650 mg, 650 mg, Oral, Q4H PRN, Tasha Alasid, DO, 650 mg at 03/24/24 0253    amiodarone (CORDARONE) 900 mg in dextrose 5 % 500 mL infusion, 0.5 mg/min, Intravenous, Continuous, Tasha Connolly DO, Last Rate: 16.7 mL/hr at 03/23/24 1801, 0.5 mg/min at 03/23/24 1801    apixaban (ELIQUIS) tablet 5 mg, 5 mg, Oral, BID, Tasha Alasid, DO, 5 mg at 03/24/24 0809    aspirin chewable tablet 81 mg, 81 mg, Oral, Daily, Tasha Alasid, DO, 81 mg at 03/24/24 0809    atorvastatin (LIPITOR) tablet 80 mg, 80 mg, Oral, QPM, Tasha Alasid, DO, 80 mg at 03/23/24 1705    [COMPLETED] clopidogrel (PLAVIX) tablet 600 mg, 600 mg, Oral, Once, 600 mg at 03/23/24 2132 **FOLLOWED BY** clopidogrel (PLAVIX) tablet 75 mg, 75 mg, Oral, Daily, Tasha Connolly DO, 75 mg at 03/24/24 0809    furosemide (LASIX) injection 40 mg, 40 mg, Intravenous, BID (diuretic), Tasha Connolly DO,  40 mg at 03/24/24 0809    gabapentin (NEURONTIN) capsule 100 mg, 100 mg, Oral, BID, Tasha Magid, DO, 100 mg at 03/24/24 0809    heparin (porcine) subcutaneous injection 5,000 Units, 5,000 Units, Subcutaneous, Q8H MONA, Tasha Magid, DO, 5,000 Units at 03/24/24 0600    insulin lispro (HumALOG/ADMELOG) 100 units/mL subcutaneous injection 1-5 Units, 1-5 Units, Subcutaneous, HS, Tasha Magid, DO, 2 Units at 03/23/24 2135    insulin lispro (HumALOG/ADMELOG) 100 units/mL subcutaneous injection 1-6 Units, 1-6 Units, Subcutaneous, TID AC, 2 Units at 03/24/24 0744 **AND** Fingerstick Glucose (POCT), , , TID AC, Tasha Alasid, DO    metoprolol succinate (TOPROL-XL) 24 hr tablet 50 mg, 50 mg, Oral, Daily, Tasha Alasid, DO, 50 mg at 03/24/24 0809    nicotine (NICODERM CQ) 21 mg/24 hr TD 24 hr patch 21 mg, 21 mg, Transdermal, Daily, Tasha Alasid, DO, 21 mg at 03/23/24 2149     Labs & Results:        Results from last 7 days   Lab Units 03/24/24  0604 03/23/24  0549 03/22/24  1822   WBC Thousand/uL 17.66* 18.62* 8.94   HEMOGLOBIN g/dL 14.3 15.0 16.0*   HEMATOCRIT % 43.9 45.9 48.3*   PLATELETS Thousands/uL 331 326 306     Results from last 7 days   Lab Units 03/22/24  1822   TRIGLYCERIDES mg/dL 185*   HDL mg/dL 50     Results from last 7 days   Lab Units 03/24/24  0604 03/23/24  0549 03/22/24  1822   POTASSIUM mmol/L 4.0 4.9 4.1   CHLORIDE mmol/L 102 104 103   CO2 mmol/L 24 20* 19*   BUN mg/dL 20 15 14   CREATININE mg/dL 0.81 0.69 0.78   CALCIUM mg/dL 9.2 8.8 9.7   ALK PHOS U/L  --  44 49   ALT U/L  --  44 56*   AST U/L  --  52* 55*         Results from last 7 days   Lab Units 03/24/24  0604 03/23/24  0549 03/22/24  1822   MAGNESIUM mg/dL 2.1 2.1 2.9*       EKG personally reviewed by Beronica Ledbetter MD.

## 2024-03-24 NOTE — PLAN OF CARE
Problem: Prexisting or High Potential for Compromised Skin Integrity  Goal: Skin integrity is maintained or improved  Description: INTERVENTIONS:  - Identify patients at risk for skin breakdown  - Assess and monitor skin integrity  - Assess and monitor nutrition and hydration status  - Monitor labs   - Assess for incontinence   - Turn and reposition patient  - Assist with mobility/ambulation  - Relieve pressure over bony prominences  - Avoid friction and shearing  - Provide appropriate hygiene as needed including keeping skin clean and dry  - Evaluate need for skin moisturizer/barrier cream  - Collaborate with interdisciplinary team   - Patient/family teaching  - Consider wound care consult   Outcome: Progressing     Problem: PAIN - ADULT  Goal: Verbalizes/displays adequate comfort level or baseline comfort level  Description: Interventions:  - Encourage patient to monitor pain and request assistance  - Assess pain using appropriate pain scale  - Administer analgesics based on type and severity of pain and evaluate response  - Implement non-pharmacological measures as appropriate and evaluate response  - Consider cultural and social influences on pain and pain management  - Notify physician/advanced practitioner if interventions unsuccessful or patient reports new pain  Outcome: Progressing     Problem: INFECTION - ADULT  Goal: Absence or prevention of progression during hospitalization  Description: INTERVENTIONS:  - Assess and monitor for signs and symptoms of infection  - Monitor lab/diagnostic results  - Monitor all insertion sites, i.e. indwelling lines, tubes, and drains  - Monitor endotracheal if appropriate and nasal secretions for changes in amount and color  - [place] appropriate cooling/warming therapies per order  - Administer medications as ordered  - Instruct and encourage patient and family to use good hand hygiene technique  - Identify and instruct in appropriate isolation precautions for  identified infection/condition  Outcome: Progressing     Problem: SAFETY ADULT  Goal: Patient will remain free of falls  Description: INTERVENTIONS:  - Educate patient/family on patient safety including physical limitations  - Instruct patient to call for assistance with activity   - Consult OT/PT to assist with strengthening/mobility   - Keep Call bell within reach  - Keep bed low and locked with side rails adjusted as appropriate  - Keep care items and personal belongings within reach  - Initiate and maintain comfort rounds  - Make Fall Risk Sign visible to staff  - Offer Toileting every  Hours, in advance of need  - Initiate/Maintain alarm  - Obtain necessary fall risk management equipment:   - Apply yellow socks and bracelet for high fall risk patients  - Consider moving patient to room near nurses station  Outcome: Progressing  Goal: Maintain or return to baseline ADL function  Description: INTERVENTIONS:  -  Assess patient's ability to carry out ADLs; assess patient's baseline for ADL function and identify physical deficits which impact ability to perform ADLs (bathing, care of mouth/teeth, toileting, grooming, dressing, etc.)  - Assess/evaluate cause of self-care deficits   - Assess range of motion  - Assess patient's mobility; develop plan if impaired  - Assess patient's need for assistive devices and provide as appropriate  - Encourage maximum independence but intervene and supervise when necessary  - Involve family in performance of ADLs  - Assess for home care needs following discharge   - Consider OT consult to assist with ADL evaluation and planning for discharge  - Provide patient education as appropriate  Outcome: Progressing     Problem: DISCHARGE PLANNING  Goal: Discharge to home or other facility with appropriate resources  Description: INTERVENTIONS:  - Identify barriers to discharge w/patient and caregiver  - Arrange for needed discharge resources and transportation as appropriate  - Identify  discharge learning needs (meds, wound care, etc.)  - Arrange for interpretive services to assist at discharge as needed  - Refer to Case Management Department for coordinating discharge planning if the patient needs post-hospital services based on physician/advanced practitioner order or complex needs related to functional status, cognitive ability, or social support system  Outcome: Progressing     Problem: Knowledge Deficit  Goal: Patient/family/caregiver demonstrates understanding of disease process, treatment plan, medications, and discharge instructions  Description: Complete learning assessment and assess knowledge base.  Interventions:  - Provide teaching at level of understanding  - Provide teaching via preferred learning methods  Outcome: Progressing

## 2024-03-24 NOTE — CONSULTS
Consultation -  Cardiac Electrophysiology  Vesna Langston 63 y.o. female MRN: [id number]  Unit/Bed#: [facility] 506-01 Encounter: [id number]      Assessment/Plan:  Ventricular tachycardia  Patient presented with syncopal episode likely due to ventricular tachycardia  In the emergency room she also has sustained ventricular tachycardia that required cardioversion  Patient has risk factors for having scar due to her underlying coronary artery disease  She is also at risk of having further ventricular tachycardia as a result  Also has cardiomyopathy with ejection fraction of 35% with severe hypokinesis  Recommend ICD placement for secondary prevention  We discussed the procedure in detail and she is agreeable to proceed with it  Would plan for dual-chamber ICD placement (atrial lead given patient has sick sinus syndrome and atrial flutter)  Continue IV amiodarone and can start oral amiodarone load at 400 mg twice daily for 1 week then 200 mg daily  Continue metoprolol  Patient was counseled on reducing caffeine intake and alcohol intake  Coronary artery disease  Patient had lateral ST elevation MI on the EKG  Patient had calcified ostial LAD lesion and underwent PCI of the lesion  Maintained on metoprolol, aspirin, Plavix  Denies angina  Counseled on appropriate diet, quitting smoking and reducing caffeine intake  Paroxysmal atrial flutter  Patient had EKG showing atrial flutter which terminated with adenosine  On telemetry she also had episode of atrial flutter with RVR  Continue metoprolol  Can uptitrate metoprolol after receiving ICD  Continue amiodarone and start oral load as well with 400 mg twice daily for 1 week then 200 mg daily  Continue to coagulation with Eliquis  May require ablation in the future  Ischemic cardiomyopathy  Currently euvolemic  Ejection fraction is 35% with severe hypokinesis of the LAD territory  Maintained on metoprolol, losartan and currently at goal-directed medical therapy  Can uptitrate  metoprolol after ICD placement  Type 2 diabetes  HgA1c 7.9   Further care per primary team  Hyperlipidemia  Started on high dose statin   Tobacco abuse  Patient is interested in quitting  Currently wearing Nicotine patch  History of Present Illness   Physician Requesting Consult: Gabriel Myers MD  Reason for Consult / Principal Problem: Ventricular tachycardia  HPI: Vesna Langston is a 63 y.o. year old female with no significant prior history who presented to the hospital with multiple syncopal episode.  Patient was leaning forward to get something off the floor and then woke up on the floor.  She called her son through [facility] and then had syncopal episode again while sitting on the dining table.  She had chest pressure and nausea as well.  On arrival to the emergency room she was noted to be in atrial flutter with RVR and was given adenosine which converted to sinus rhythm.  Shortly after she had sustained ventricular tachycardia with symptoms and was cardioverted after 1 shock.  EKG had shown lateral ST segment elevation and was rushed to [facility] Cath Lab.  Left heart cath revealed heavily diseased LAD and she received drug-eluting stents x 2.  She was again noted to be in atrial flutter in the Cath Lab and received amiodarone bolus as well as metoprolol with conversion to sinus rhythm.  Her echocardiogram showed ejection fraction of 35%.  She is also now diagnosed with new onset of diabetes.  She was an active smoker prior to her episode but now has shown interest of quitting and currently wearing nicotine patch.  On telemetry she had again brief episode of atrial flutter overnight.  She has not had further ventricular tachycardia episodes.  She has been started on metoprolol, losartan and currently on IV amiodarone drip.    Consults    Review of Systems:   Review of Systems   Constitutional: Negative for chills and fever.   HENT: Negative.     Eyes:  Negative for blurred vision and double vision.    Cardiovascular:  Negative for chest pain, dyspnea on exertion, leg swelling, near-syncope, orthopnea, palpitations, paroxysmal nocturnal dyspnea and syncope.   Respiratory:  Negative for cough and sputum production.    Endocrine: Negative.    Skin: Negative.  Negative for rash.   Musculoskeletal: Negative.  Negative for arthritis and joint pain.   Gastrointestinal:  Negative for abdominal pain, nausea and vomiting.   Genitourinary: Negative.    Neurological: Negative.  Negative for dizziness and light-headedness.   Psychiatric/Behavioral: Negative.  The patient is not nervous/anxious.         Historical Information   Past Medical History:   Diagnosis Date    Allergic     Hypoglycemia      Past Surgical History:   Procedure Laterality Date    ADENOIDECTOMY       SECTION      ECTOPIC PREGNANCY SURGERY      SEPTOPLASTY      TONSILLECTOMY          Family History   Problem Relation Age of Onset    Depression Mother     Heart disease Father      Social History     Socioeconomic History    Marital status: /Civil Union     Spouse name: Not on file    Number of children: Not on file    Years of education: Not on file    Highest education level: Not on file   Occupational History    Not on file   Tobacco Use    Smoking status: Every Day     Current packs/day: 1.00     Types: Cigarettes    Smokeless tobacco: Never   Substance and Sexual Activity    Alcohol use: Yes    Drug use: Not Currently    Sexual activity: Not on file   Other Topics Concern    Not on file   Social History Narrative    Not on file     Social Determinants of Health     Financial Resource Strain: Low Risk  (2023)    Received from [facility]    Overall Financial Resource Strain (CARDIA)     Difficulty of Paying Living Expenses: Not hard at all   Food Insecurity: No Food Insecurity (2023)    Received from [facility]    Hunger Vital Sign     Worried About Running Out of Food in the Last Year: Never  true     Ran Out of Food in the Last Year: Never true   Transportation Needs: No Transportation Needs (2/20/2023)    Received from [facility]    PRAPARE - Transportation     Lack of Transportation (Medical): No     Lack of Transportation (Non-Medical): No   Physical Activity: Not on file   Stress: No Stress Concern Present (2/20/2023)    Received from [facility]    [employer] - Occupational Stress Questionnaire     Feeling of Stress : Not at all   Social Connections: Moderately Isolated (2/20/2023)    Received from [facility]    Social Connection and Isolation Panel [NHANES]     Frequency of Communication with Friends and Family: More than three times a week     Frequency of Social Gatherings with Friends and Family: Once a week     Attends Anglican Services: More than 4 times per year     Active Member of Clubs or Organizations: No     Attends Club or Organization Meetings: Never     Marital Status:    Intimate Partner Violence: Not At Risk (2/20/2023)    Received from [facility]    Humiliation, Afraid, Rape, and Kick questionnaire     Fear of Current or Ex-Partner: No     Emotionally Abused: No     Physically Abused: No     Sexually Abused: No   Housing Stability: Low Risk  (2/20/2023)    Received from [facility]    Housing Stability Vital Sign     Unable to Pay for Housing in the Last Year: No     Number of Places Lived in the Last Year: 1     Unstable Housing in the Last Year: No     Social History     Tobacco Use   Smoking Status Every Day    Current packs/day: 1.00    Types: Cigarettes   Smokeless Tobacco Never     Social History     Substance and Sexual Activity   Alcohol Use Yes     Meds/Allergies     Medication Administration - last 24 hours from 03/23/2024 1104 to 03/24/2024 1104         Date/Time Order Dose Route Action Action by     03/23/2024 1801 EDT amiodarone (CORDARONE) 900  mg in dextrose 5 % 500 mL infusion 0.5 mg/min Intravenous New Bag Caitlin Montilla RN     03/24/2024 0253 EDT acetaminophen (TYLENOL) tablet 650 mg 650 mg Oral Given Lianne Thornton     03/23/2024 2137 EDT acetaminophen (TYLENOL) tablet 650 mg 650 mg Oral Given Lyndanasim Spear     03/23/2024 1805 EDT acetaminophen (TYLENOL) tablet 650 mg -- Oral MAR Unhold Tasha Connolly, DO     03/23/2024 1800 EDT acetaminophen (TYLENOL) tablet 650 mg -- Oral MAR Hold Automatic Transfer Provider     03/24/2024 0809 EDT aspirin chewable tablet 81 mg 81 mg Oral Given Yanci Florez RN     03/23/2024 1805 EDT aspirin chewable tablet 81 mg -- Oral MAR Unhold Tasha Connolly, DO     03/23/2024 1800 EDT aspirin chewable tablet 81 mg -- Oral MAR Hold Automatic Transfer Provider     03/23/2024 1805 EDT atorvastatin (LIPITOR) tablet 80 mg -- Oral MAR Unhold Tasha Connolly, DO     03/23/2024 1800 EDT atorvastatin (LIPITOR) tablet 80 mg -- Oral MAR Hold Automatic Transfer Provider     03/23/2024 1705 EDT atorvastatin (LIPITOR) tablet 80 mg 80 mg Oral Given Caitlin Montilla RN     03/23/2024 1805 EDT metoprolol succinate (TOPROL-XL) 24 hr tablet 25 mg -- Oral MAR Unhold Tasha Connolly, DO     03/23/2024 1800 EDT metoprolol succinate (TOPROL-XL) 24 hr tablet 25 mg -- Oral MAR Hold Automatic Transfer Provider     03/23/2024 1129 EDT metoprolol succinate (TOPROL-XL) 24 hr tablet 25 mg 25 mg Oral Given Caitlin Montilla RN     03/24/2024 1103 EDT insulin lispro (HumALOG/ADMELOG) 100 units/mL subcutaneous injection 1-6 Units 1 Units Subcutaneous Given Yanci Florez RN     03/24/2024 0744 EDT insulin lispro (HumALOG/ADMELOG) 100 units/mL subcutaneous injection 1-6 Units 2 Units Subcutaneous Given Yanci Florez RN     03/23/2024 1805 EDT insulin lispro (HumALOG/ADMELOG) 100 units/mL subcutaneous injection 1-6 Units -- Subcutaneous CB Connolly DO     03/23/2024 1800 EDT insulin lispro (HumALOG/ADMELOG) 100 units/mL subcutaneous  injection 1-6 Units -- Subcutaneous MAR Hold Automatic Transfer Provider     03/23/2024 1643 EDT insulin lispro (HumALOG/ADMELOG) 100 units/mL subcutaneous injection 1-6 Units 2 Units Subcutaneous Given Caitlin Montilla RN     03/23/2024 1118 EDT insulin lispro (HumALOG/ADMELOG) 100 units/mL subcutaneous injection 1-6 Units 4 Units Subcutaneous Given Caitlin Montilla RN     03/23/2024 2135 EDT insulin lispro (HumALOG/ADMELOG) 100 units/mL subcutaneous injection 1-5 Units 2 Units Subcutaneous Given Ana Spear     03/23/2024 1805 EDT insulin lispro (HumALOG/ADMELOG) 100 units/mL subcutaneous injection 1-5 Units -- Subcutaneous MAR Unhold Tasha Connolly, DO     03/23/2024 1800 EDT insulin lispro (HumALOG/ADMELOG) 100 units/mL subcutaneous injection 1-5 Units -- Subcutaneous MAR Hold Automatic Transfer Provider     03/24/2024 0600 EDT heparin (porcine) subcutaneous injection 5,000 Units 5,000 Units Subcutaneous Given Lianne Thornton     03/23/2024 2135 EDT heparin (porcine) subcutaneous injection 5,000 Units 5,000 Units Subcutaneous Given Ana Spear     03/23/2024 1805 EDT heparin (porcine) subcutaneous injection 5,000 Units -- Subcutaneous MAR Unhold Tasha Connolly, DO     03/23/2024 1800 EDT heparin (porcine) subcutaneous injection 5,000 Units -- Subcutaneous MAR Hold Automatic Transfer Provider     03/23/2024 1417 EDT heparin (porcine) subcutaneous injection 5,000 Units 5,000 Units Subcutaneous Given Caitlin Montilla RN     03/24/2024 0809 EDT apixaban (ELIQUIS) tablet 5 mg 5 mg Oral Given Yanci Florez RN     03/23/2024 1805 EDT apixaban (ELIQUIS) tablet 5 mg -- Oral MAR Unhold Tasha Connolly, DO     03/23/2024 1800 EDT apixaban (ELIQUIS) tablet 5 mg -- Oral MAR Hold Automatic Transfer Provider     03/23/2024 1705 EDT apixaban (ELIQUIS) tablet 5 mg 5 mg Oral Given Caitlin Montilla RN     03/23/2024 1129 EDT apixaban (ELIQUIS) tablet 5 mg 5 mg Oral Given Caitlin Montilla RN     03/23/2024 8870  EDT clopidogrel (PLAVIX) tablet 600 mg 600 mg Oral Given Lyndanasim MurilloHeredia Rainer     03/23/2024 1805 EDT clopid[facility] (PLAVIX) tablet 600 mg [facility]     03/23/2024 1800 EDT clopidogrel (PLAVIX) tablet 600 mg -- Oral MAR Hold Automatic Transfer Provider     03/24/2024 0809 EDT clopidogrel (PLAVIX) tablet 75 mg 75 mg Oral Given Yanci Florez RN     03/23/2024 1805 EDT clopidogrel (PLAVIX) tablet 75 mg -- [facility]     03/23/2024 1800 EDT clopidogrel (PLAVIX) tablet 75 mg -- Oral MAR Hold Automatic Transfer Provider     03/24/2024 0809 EDT furosemide (LASIX) injection 40 mg 40 mg Intravenous Given Yanci Florez RN     03/23/2024 1805 EDT furosemide (LASIX) injection 40 mg -- [facility]     03/23/2024 1800 EDT furosemide (LASIX) injection 40 mg -- Intravenous MAR Hold Automatic Transfer Provider     03/23/2024 1638 EDT furosemide (LASIX) injection 40 mg 40 mg Intravenous Given Caitlin Montilla RN     03/24/2024 0809 EDT gabapentin (NEURONTIN) capsule 100 mg 100 mg Oral Given Yanci Floerz RN     03/23/2024 1805 EDT gabapentin (NEURONTIN) capsule 100 mg -- [facility]     03/23/2024 1800 EDT gabapentin (NEURONTIN) capsule 100 mg -- Oral MAR Hold Automatic Transfer Provider     03/23/2024 1705 EDT gabapentin (NEURONTIN) capsule 100 mg 100 mg Oral Given Caitlni Montilla RN     03/23/2024 1417 EDT gabapentin (NEURONTIN) capsule 100 mg 100 mg Oral Given Caitlin Montilla RN     03/23/2024 2149 EDT nicotine (NICODERM CQ) 21 mg/24 hr TD 24 hr patch 21 mg 21 mg Transdermal Medication Applied Lianne Regankerman     03/24/2024 0018 EDT metoprolol (LOPRESSOR) injection 5 mg 5 mg Intravenous Given Lianneanitha Irvingman     03/23/2024 2244 EDT metoprolol tartrate (LOPRESSOR) tablet 25 mg 25 mg Oral Given Lianne Thornton     03/24/2024 0809 EDT metoprolol succinate (TOPROL-XL) 24 hr tablet 50 mg 50 mg Oral Given Yanci Florez RN      03/24/2024 1102 EDT losartan (COZAAR) tablet 25 mg 25 mg Oral Given Yanci Florez RN              Current Facility-Administered Medications:     acetaminophen (TYLENOL) tablet 650 mg, 650 mg, Oral, Q4H PRN, Tasha Alasid, DO, 650 mg at 03/24/24 0253    amiodarone (CORDARONE) 900 mg in dextrose 5 % 500 mL infusion, 0.5 mg/min, Intravenous, Continuous, Tasha Alasid DO, Last Rate: 16.7 mL/hr at 03/23/24 1801, 0.5 mg/min at 03/23/24 1801    apixaban (ELIQUIS) tablet 5 mg, 5 mg, Oral, BID, Tasha Alasid, DO, 5 mg at 03/24/24 0809    aspirin chewable tablet 81 mg, 81 mg, Oral, Daily, Tasha Alasid, DO, 81 mg at 03/24/24 0809    atorvastatin (LIPITOR) tablet 80 mg, 80 mg, Oral, QPM, Tasha Alasid, DO, 80 mg at 03/23/24 1705    [COMPLETED] clopidogrel (PLAVIX) tablet 600 mg, 600 mg, Oral, Once, 600 mg at 03/23/24 2132 **FOLLOWED BY** clopidogrel (PLAVIX) tablet 75 mg, 75 mg, Oral, Daily, Tasha Alasid, DO, 75 mg at 03/24/24 0809    furosemide (LASIX) injection 40 mg, 40 mg, Intravenous, BID (diuretic), Tasha Alasid, DO, 40 mg at 03/24/24 0809    gabapentin (NEURONTIN) capsule 100 mg, 100 mg, Oral, BID, Tasha Alasid, DO, 100 mg at 03/24/24 0809    heparin (porcine) subcutaneous injection 5,000 Units, 5,000 Units, Subcutaneous, Q8H MONA, Tasha Alasid, DO, 5,000 Units at 03/24/24 0600    insulin lispro (HumALOG/ADMELOG) 100 units/mL subcutaneous injection 1-5 Units, 1-5 Units, Subcutaneous, HS, Tasha Alasid, DO, 2 Units at 03/23/24 2135    insulin lispro (HumALOG/ADMELOG) 100 units/mL subcutaneous injection 1-6 Units, 1-6 Units, Subcutaneous, TID AC, 1 Units at 03/24/24 1103 **AND** Fingerstick Glucose (POCT), , , TID AC, Tasha Connolly DO    losartan (COZAAR) tablet 25 mg, 25 mg, Oral, Daily, Criselda Flynn MD, 25 mg at 03/24/24 1102    metoprolol succinate (TOPROL-XL) 24 hr tablet 50 mg, 50 mg, Oral, Daily, Tasha Connolly DO, 50 mg at 03/24/24 0809    nicotine (NICODERM CQ) 21 mg/24 hr TD 24 hr patch 21 mg, 21  "mg, Transdermal, Daily, Tasha Connolly DO, 21 mg at 03/23/24 2149    Allergies   Allergen Reactions    Shellfish-Derived Products - Food Allergy Anaphylaxis    Azithromycin Rash       Objective   Vitals: Blood pressure 115/73, pulse 89, temperature 98.3 °F (36.8 °C), temperature source Oral, resp. rate 18, height 5' 8\" (1.727 m), weight 93.1 kg (205 lb 4 oz), SpO2 97%., Body mass index is 31.21 kg/m².,   Orthostatic Blood Pressures      Flowsheet Row Most Recent Value   Blood Pressure 115/73 filed at 03/24/2024 1055   Patient Position - Orthostatic VS Lying filed at 03/24/2024 1055              Intake/Output Summary (Last 24 hours) at 3/24/2024 1104  Last data filed at 3/24/2024 0911  Gross per 24 hour   Intake 1267.68 ml   Output 5425 ml   Net -4157.32 ml       Invasive Devices       Peripheral Intravenous Line  Duration             Peripheral IV 03/24/24 Dorsal (posterior);Left Forearm <1 day                      Physical Exam:  GEN: Vesna Langston appears well, alert and oriented x 3, pleasant and cooperative   HEENT: pupils equal, round, and reactive to light; extraocular muscles intact  NECK: supple, no carotid bruits   HEART: regular rhythm, normal S1 and S2, no murmurs, clicks, gallops or rubs   LUNGS: clear to auscultation bilaterally; no wheezes, rales, or rhonchi   ABDOMEN: normal bowel sounds, soft, no tenderness, no distention  EXTREMITIES: peripheral pulses normal; no clubbing, cyanosis, or edema  NEURO: no focal findings   SKIN: normal without suspicious lesions on exposed skin      Lab Results:   CBC with diff:   Lab Results   Component Value Date    WBC 17.66 (H) 03/24/2024    HGB 14.3 03/24/2024    HCT 43.9 03/24/2024    MCV 91 03/24/2024     03/24/2024    RBC 4.83 03/24/2024    MCH 29.6 03/24/2024    MCHC 32.6 03/24/2024    RDW 14.3 03/24/2024    MPV 10.2 03/24/2024    NRBC 0 03/23/2024       CMP:  Lab Results   Component Value Date    K 4.0 03/24/2024    K 4.1 08/02/2022     03/24/2024    " " 08/02/2022    CO2 24 03/24/2024    CO2 26 08/02/2022    BUN 20 03/24/2024    BUN 17 08/02/2022    CREATININE 0.81 03/24/2024    CREATININE 0.70 08/02/2022    CALCIUM 9.2 03/24/2024    CALCIUM 8.9 08/02/2022    AST 52 (H) 03/23/2024    AST 55 (H) 07/31/2022    ALT 44 03/23/2024    ALT 57 (H) 07/31/2022    ALKPHOS 44 03/23/2024    ALKPHOS 37 07/31/2022    EGFR 76 03/24/2024    EGFR 97 08/02/2022       BMP:  Results from last 7 days   Lab Units 03/24/24  0604 03/23/24  0549 03/22/24  1822   POTASSIUM mmol/L 4.0 4.9 4.1   CHLORIDE mmol/L 102 104 103   CO2 mmol/L 24 20* 19*   BUN mg/dL 20 15 14   CREATININE mg/dL 0.81 0.69 0.78   CALCIUM mg/dL 9.2 8.8 9.7       Magnesium:   Lab Results   Component Value Date    MG 2.1 03/24/2024       CPK:       Troponin: No results found for: \"TROPONINI\"    BNP:   Lab Results   Component Value Date     (H) 03/22/2024       Coags:   Lab Results   Component Value Date    PT 12.9 07/31/2022    INR 1.0 07/31/2022       TSH: No results found for: \"TSH\"    Lipid Profile: No results found for: \"LABLIPI\"    Imaging: I have personally reviewed pertinent films in PACS  XR chest portable ICU    Result Date: 3/23/2024  Narrative: XR CHEST PORTABLE ICU INDICATION: Hypoxia and cough. COMPARISON: Chest radiograph performed earlier the same day Views: 1 (semierect portable) FINDINGS: Monitoring leads and clips project over the chest. Similar appearance of mild pulmonary vascular congestion. No acute infiltrate, pneumothorax or discrete pleural effusion. Borderline enlarged cardiac silhouette, unchanged. Bones are unremarkable for age. Normal upper abdomen.     Impression: Similar appearance of mild pulmonary venous congestion. Resident: MATEO SEGAL I, the attending radiologist, have reviewed the images and agree with the final report above. Workstation performed: [id number]     Echo complete w/ contrast if indicated    Result Date: 3/23/2024  Narrative:   Left Ventricle: Left " ventricular cavity size is dilated. Wall thickness is normal. The left ventricular ejection fraction is 35%. Systolic function is moderately reduced.   The following segments are akinetic: mid anterior, mid anteroseptal, apical anterior, apical inferior, apical lateral and apex.   The following segments are hypokinetic: apical septal.   The following segments are hyperkinetic: basal inferior, basal inferolateral and mid inferior.   All other segments are normal.   Left Atrium: The atrium is moderately dilated (42-48 mL/m2).   Mitral Valve: There is moderate regurgitation.   Tricuspid Valve: There is mild regurgitation. The right ventricular systolic pressure is moderately elevated. The estimated right ventricular systolic pressure is 51.00 mmHg. Moderately reduced LVEF. Akinetic LAD territory with evidence of thinning/scarring in most of these regions.     XR chest portable    Result Date: 3/23/2024  Narrative: XR CHEST PORTABLE INDICATION: cp. COMPARISON: None FINDINGS: Mild pulmonary venous congestion. No pneumothorax or pleural effusion. Heart at upper limit of normal in size. Bones are unremarkable for age. Normal upper abdomen.     Impression: Mild pulmonary venous congestion. Workstation performed: [id number]     Cardiac catheterization    Result Date: 3/22/2024  Narrative:   Ost LAD to Prox LAD lesion is 100% stenosed. Single-vessel coronary disease.  There was an ostial total occlusion of the LAD.  There were no other significant lesions.  The patient's clinical presentation with recurrent syncope and VT, no chest discomfort, the marked difficulty crossing the total occlusion with a wire, and the resistance to opening with balloon dilation or point toward a chronic total occlusion rather than acute thrombosis. Successful IVUS-guided PCI, ostial/proximal LAD, with reduction in stenosis from 100% with MANNY 0 flow to 0% with MANNY-3 flow following pre-dilation, placement of a 2.5x48mm NATHAN, proximal post-dilation  with a 3.0mm NC balloon, and distal injection of adenosine via microcatheter to treat slow distal flow. Markedly elevated LVEDP (35 mmHg), measured following PCI.     MRI lumbar spine w wo contrast    Result Date: 3/18/2024  Narrative: MRI lumbar spine with and without contrast CLINICAL INFORMATION: Back pain. TECHNIQUE: Multiplanar, multisequence MRI lumbar spine was performed before and after contrast administration. 20 mL of CLARISCAN was administered. COMPARISON: 08/01/2022 and prior examinations. FINDINGS: Marrow and alignment: The lumbar vertebrae are numbered from caudal to cranial direction, starting from the most inferior lumbar type vertebra numbered as L5 to L1. Likely osseous hemangioma in T11, partially included. Postoperative changes and fusion from L3-S1. Grade 1 anterolisthesis of L4 over L5. Multilevel degenerative changes including disc desiccation, osteophytes, and facet arthropathy. L1-2: No spinal canal or foraminal stenosis. L2-3: Disc bulge. No significant spinal canal or foraminal stenosis. L3-4: Disc bulge. Central/left paracentral disc herniation. No significant spinal canal stenosis. Mild bilateral foraminal stenosis. L4-5: No significant spinal canal stenosis. Mild bilateral foraminal stenosis. L5-S1: Postoperative changes. Moderate loss of disc height. Degenerative endplate signal changes. Disc bulge and osteophytes. No significant spinal canal stenosis. Mild bilateral foraminal stenosis. CSF/spinal cord: The conus medullaris terminates at T12-L1 level. No definite abnormal signal intensity in the lower cord. Paraspinal soft tissues: Postoperative changes. Additional findings: Cholelithiasis. 2 cm left renal cyst.    Impression: IMPRESSION: 1.  Postoperative changes from L3-S1. 2.  Multilevel degenerative changes as discussed above. No significant spinal canal stenosis. Mild bilateral foraminal stenosis at L3-4, L4-5, and L5-S1. Workstation:[email or web address]      Cardiac testing:   No results found  for this or any previous visit.    No results found for this or any previous visit.    No results found for this or any previous visit.    No results found for this or any previous visit.        EKG: I personally reviewed the ECGs performed this admission.               Lance Starr MD    * This note was completed in part utilizing [employer] direct voice recognition software.   Grammatical errors, random word insertion, spelling mistakes, and incomplete sentences may be an occasional consequence of the system secondary to software limitations, ambient noise and hardware issues. At the time of dictation, efforts were made to edit, clarify and /or correct errors. Please read the chart carefully and recognize, using context, where substitutions have occurred.  If you have any questions or concerns about the context, text or information contained within the body of this dictation, please contact myself, the provider, for further clarification.   Presented to the hospital with multiple syncopal episode.  Presented with

## 2024-03-24 NOTE — PLAN OF CARE
Problem: Prexisting or High Potential for Compromised Skin Integrity  Goal: Skin integrity is maintained or improved  Description: INTERVENTIONS:  - Identify patients at risk for skin breakdown  - Assess and monitor skin integrity  - Assess and monitor nutrition and hydration status  - Monitor labs   - Assess for incontinence   - Turn and reposition patient  - Assist with mobility/ambulation  - Relieve pressure over bony prominences  - Avoid friction and shearing  - Provide appropriate hygiene as needed including keeping skin clean and dry  - Evaluate need for skin moisturizer/barrier cream  - Collaborate with interdisciplinary team   - Patient/family teaching  - Consider wound care consult   Outcome: Progressing     Problem: PAIN - ADULT  Goal: Verbalizes/displays adequate comfort level or baseline comfort level  Description: Interventions:  - Encourage patient to monitor pain and request assistance  - Assess pain using appropriate pain scale  - Administer analgesics based on type and severity of pain and evaluate response  - Implement non-pharmacological measures as appropriate and evaluate response  - Consider cultural and social influences on pain and pain management  - Notify physician/advanced practitioner if interventions unsuccessful or patient reports new pain  Outcome: Progressing     Problem: INFECTION - ADULT  Goal: Absence or prevention of progression during hospitalization  Description: INTERVENTIONS:  - Assess and monitor for signs and symptoms of infection  - Monitor lab/diagnostic results  - Monitor all insertion sites, i.e. indwelling lines, tubes, and drains  - Monitor endotracheal if appropriate and nasal secretions for changes in amount and color  - Norman appropriate cooling/warming therapies per order  - Administer medications as ordered  - Instruct and encourage patient and family to use good hand hygiene technique  - Identify and instruct in appropriate isolation precautions for  identified infection/condition  Outcome: Progressing     Problem: SAFETY ADULT  Goal: Patient will remain free of falls  Description: INTERVENTIONS:  - Educate patient/family on patient safety including physical limitations  - Instruct patient to call for assistance with activity   - Consult OT/PT to assist with strengthening/mobility   - Keep Call bell within reach  - Keep bed low and locked with side rails adjusted as appropriate  - Keep care items and personal belongings within reach  - Initiate and maintain comfort rounds  - Make Fall Risk Sign visible to staff  - Apply yellow socks and bracelet for high fall risk patients  - Consider moving patient to room near nurses station  Outcome: Progressing  Goal: Maintain or return to baseline ADL function  Description: INTERVENTIONS:  -  Assess patient's ability to carry out ADLs; assess patient's baseline for ADL function and identify physical deficits which impact ability to perform ADLs (bathing, care of mouth/teeth, toileting, grooming, dressing, etc.)  - Assess/evaluate cause of self-care deficits   - Assess range of motion  - Assess patient's mobility; develop plan if impaired  - Assess patient's need for assistive devices and provide as appropriate  - Encourage maximum independence but intervene and supervise when necessary  - Involve family in performance of ADLs  - Assess for home care needs following discharge   - Consider OT consult to assist with ADL evaluation and planning for discharge  - Provide patient education as appropriate  Outcome: Progressing     Problem: DISCHARGE PLANNING  Goal: Discharge to home or other facility with appropriate resources  Description: INTERVENTIONS:  - Identify barriers to discharge w/patient and caregiver  - Arrange for needed discharge resources and transportation as appropriate  - Identify discharge learning needs (meds, wound care, etc.)  - Arrange for interpretive services to assist at discharge as needed  - Refer to  Case Management Department for coordinating discharge planning if the patient needs post-hospital services based on physician/advanced practitioner order or complex needs related to functional status, cognitive ability, or social support system  Outcome: Progressing     Problem: Knowledge Deficit  Goal: Patient/family/caregiver demonstrates understanding of disease process, treatment plan, medications, and discharge instructions  Description: Complete learning assessment and assess knowledge base.  Interventions:  - Provide teaching at level of understanding  - Provide teaching via preferred learning methods  Outcome: Progressing

## 2024-03-24 NOTE — PROGRESS NOTES
This note is linked to the cardiology progress note from the cardiology fellow today.  I saw and examined the patient myself.        Assessment:  -Atrial flutter  -Sustained ventricular tachycardia  -Heart failure with reduced ejection fraction: Volume overloaded but well-perfused  -Coronary artery disease with  of the LAD status post stenting and MANNY-3 flow  -Newly diagnosed type 2 diabetes  -Active tobacco use  -Dyslipidemia      Plan:    -Has been having episodes of atrial flutter overnight  -Continue amiodarone drip and increased dose of metoprolol  -Triple therapy with aspirin, Plavix and apixaban for 1 week followed by transition to only Plavix and apixaban  -Clinically appears volume overloaded, continue Lasix 40 mg twice daily  - Start losartan for GDMT, price check entresto  -Consult EP for ICD/atrial flutter ablation (she has been hard to rate control in atrial flutter)  -Keep K greater than 4, mag greater than 2

## 2024-03-25 PROBLEM — G89.29 CHRONIC LOW BACK PAIN: Status: ACTIVE | Noted: 2024-03-25

## 2024-03-25 PROBLEM — I50.20 HFREF (HEART FAILURE WITH REDUCED EJECTION FRACTION) (HCC): Status: ACTIVE | Noted: 2024-03-25

## 2024-03-25 PROBLEM — R06.89 ACUTE RESPIRATORY INSUFFICIENCY: Status: RESOLVED | Noted: 2024-03-22 | Resolved: 2024-03-25

## 2024-03-25 PROBLEM — E87.20 LACTIC ACIDOSIS: Status: RESOLVED | Noted: 2024-03-22 | Resolved: 2024-03-25

## 2024-03-25 PROBLEM — M54.50 CHRONIC LOW BACK PAIN: Status: ACTIVE | Noted: 2024-03-25

## 2024-03-25 LAB
ANION GAP SERPL CALCULATED.3IONS-SCNC: 10 MMOL/L (ref 4–13)
ATRIAL RATE: 330 BPM
BUN SERPL-MCNC: 23 MG/DL (ref 5–25)
CALCIUM SERPL-MCNC: 9.1 MG/DL (ref 8.4–10.2)
CHLORIDE SERPL-SCNC: 100 MMOL/L (ref 96–108)
CO2 SERPL-SCNC: 25 MMOL/L (ref 21–32)
CREAT SERPL-MCNC: 0.87 MG/DL (ref 0.6–1.3)
ERYTHROCYTE [DISTWIDTH] IN BLOOD BY AUTOMATED COUNT: 14.1 % (ref 11.6–15.1)
GFR SERPL CREATININE-BSD FRML MDRD: 70 ML/MIN/1.73SQ M
GLUCOSE SERPL-MCNC: 136 MG/DL (ref 65–140)
GLUCOSE SERPL-MCNC: 165 MG/DL (ref 65–140)
GLUCOSE SERPL-MCNC: 169 MG/DL (ref 65–140)
GLUCOSE SERPL-MCNC: 179 MG/DL (ref 65–140)
GLUCOSE SERPL-MCNC: 184 MG/DL (ref 65–140)
GLUCOSE SERPL-MCNC: 210 MG/DL (ref 65–140)
HCT VFR BLD AUTO: 44.8 % (ref 34.8–46.1)
HGB BLD-MCNC: 14.8 G/DL (ref 11.5–15.4)
KCT BLD-ACNC: 295 SEC (ref 89–137)
KCT BLD-ACNC: 308 SEC (ref 89–137)
MAGNESIUM SERPL-MCNC: 2 MG/DL (ref 1.9–2.7)
MCH RBC QN AUTO: 30.3 PG (ref 26.8–34.3)
MCHC RBC AUTO-ENTMCNC: 33 G/DL (ref 31.4–37.4)
MCV RBC AUTO: 92 FL (ref 82–98)
PLATELET # BLD AUTO: 312 THOUSANDS/UL (ref 149–390)
PMV BLD AUTO: 10.3 FL (ref 8.9–12.7)
POTASSIUM SERPL-SCNC: 3.8 MMOL/L (ref 3.5–5.3)
QRS AXIS: 87 DEGREES
QRSD INTERVAL: 80 MS
QT INTERVAL: 322 MS
QTC INTERVAL: 487 MS
RBC # BLD AUTO: 4.88 MILLION/UL (ref 3.81–5.12)
SODIUM SERPL-SCNC: 135 MMOL/L (ref 135–147)
SPECIMEN SOURCE: ABNORMAL
SPECIMEN SOURCE: ABNORMAL
T WAVE AXIS: 80 DEGREES
VENTRICULAR RATE: 138 BPM
WBC # BLD AUTO: 12.03 THOUSAND/UL (ref 4.31–10.16)

## 2024-03-25 PROCEDURE — 93010 ELECTROCARDIOGRAM REPORT: CPT | Performed by: INTERNAL MEDICINE

## 2024-03-25 PROCEDURE — 82948 REAGENT STRIP/BLOOD GLUCOSE: CPT

## 2024-03-25 PROCEDURE — 99232 SBSQ HOSP IP/OBS MODERATE 35: CPT | Performed by: INTERNAL MEDICINE

## 2024-03-25 PROCEDURE — NC001 PR NO CHARGE: Performed by: PHYSICIAN ASSISTANT

## 2024-03-25 PROCEDURE — 85027 COMPLETE CBC AUTOMATED: CPT | Performed by: INTERNAL MEDICINE

## 2024-03-25 PROCEDURE — 83735 ASSAY OF MAGNESIUM: CPT | Performed by: INTERNAL MEDICINE

## 2024-03-25 PROCEDURE — 80048 BASIC METABOLIC PNL TOTAL CA: CPT | Performed by: INTERNAL MEDICINE

## 2024-03-25 RX ORDER — ONDANSETRON 2 MG/ML
4 INJECTION INTRAMUSCULAR; INTRAVENOUS ONCE
Status: COMPLETED | OUTPATIENT
Start: 2024-03-25 | End: 2024-03-25

## 2024-03-25 RX ORDER — POTASSIUM CHLORIDE 20 MEQ/1
20 TABLET, EXTENDED RELEASE ORAL ONCE
Qty: 1 TABLET | Refills: 0 | Status: COMPLETED | OUTPATIENT
Start: 2024-03-25 | End: 2024-03-25

## 2024-03-25 RX ORDER — HYDROCODONE BITARTRATE AND ACETAMINOPHEN 5; 325 MG/1; MG/1
1 TABLET ORAL 2 TIMES DAILY PRN
Status: DISCONTINUED | OUTPATIENT
Start: 2024-03-25 | End: 2024-03-28 | Stop reason: HOSPADM

## 2024-03-25 RX ADMIN — INSULIN LISPRO 1 UNITS: 100 INJECTION, SOLUTION INTRAVENOUS; SUBCUTANEOUS at 07:43

## 2024-03-25 RX ADMIN — ASPIRIN 81 MG CHEWABLE TABLET 81 MG: 81 TABLET CHEWABLE at 08:46

## 2024-03-25 RX ADMIN — GABAPENTIN 100 MG: 100 CAPSULE ORAL at 08:46

## 2024-03-25 RX ADMIN — ONDANSETRON 4 MG: 2 INJECTION INTRAMUSCULAR; INTRAVENOUS at 18:38

## 2024-03-25 RX ADMIN — METOPROLOL SUCCINATE 50 MG: 50 TABLET, EXTENDED RELEASE ORAL at 08:46

## 2024-03-25 RX ADMIN — POTASSIUM CHLORIDE 20 MEQ: 1500 TABLET, EXTENDED RELEASE ORAL at 10:47

## 2024-03-25 RX ADMIN — HEPARIN SODIUM 5000 UNITS: 5000 INJECTION INTRAVENOUS; SUBCUTANEOUS at 05:00

## 2024-03-25 RX ADMIN — INSULIN LISPRO 2 UNITS: 100 INJECTION, SOLUTION INTRAVENOUS; SUBCUTANEOUS at 10:42

## 2024-03-25 RX ADMIN — CLOPIDOGREL BISULFATE 75 MG: 75 TABLET ORAL at 08:46

## 2024-03-25 RX ADMIN — FUROSEMIDE 40 MG: 10 INJECTION, SOLUTION INTRAMUSCULAR; INTRAVENOUS at 17:11

## 2024-03-25 RX ADMIN — ATORVASTATIN CALCIUM 80 MG: 80 TABLET, FILM COATED ORAL at 17:11

## 2024-03-25 RX ADMIN — GABAPENTIN 100 MG: 100 CAPSULE ORAL at 17:11

## 2024-03-25 RX ADMIN — APIXABAN 5 MG: 5 TABLET, FILM COATED ORAL at 17:11

## 2024-03-25 RX ADMIN — AMIODARONE HYDROCHLORIDE 0.5 MG/MIN: 50 INJECTION, SOLUTION INTRAVENOUS at 04:28

## 2024-03-25 RX ADMIN — ACETAMINOPHEN 650 MG: 325 TABLET, FILM COATED ORAL at 07:42

## 2024-03-25 RX ADMIN — INSULIN LISPRO 1 UNITS: 100 INJECTION, SOLUTION INTRAVENOUS; SUBCUTANEOUS at 21:25

## 2024-03-25 RX ADMIN — APIXABAN 5 MG: 5 TABLET, FILM COATED ORAL at 08:46

## 2024-03-25 RX ADMIN — HYDROCODONE BITARTRATE AND ACETAMINOPHEN 1 TABLET: 5; 325 TABLET ORAL at 10:47

## 2024-03-25 RX ADMIN — FUROSEMIDE 40 MG: 10 INJECTION, SOLUTION INTRAMUSCULAR; INTRAVENOUS at 10:39

## 2024-03-25 RX ADMIN — INSULIN LISPRO 1 UNITS: 100 INJECTION, SOLUTION INTRAVENOUS; SUBCUTANEOUS at 16:27

## 2024-03-25 NOTE — CONSULTS
Consult for diet education.     Reviewed CHO and  Non CHO foods, encouraged lean protein, healthy fats, high fiber. Reviewed CHO servings, how to read a food label, CHO counting.  Reviewed foods high in saturated fat and cholesterol to limit, made suggestions for healthier types of fats. Reviewed ways to increase fiber intake. Discussed tips for flavoring food versus salt. Reviewed ideas for unsweetened beverages. Goal diet CCD1, cardiac.

## 2024-03-25 NOTE — PLAN OF CARE
Problem: Prexisting or High Potential for Compromised Skin Integrity  Goal: Skin integrity is maintained or improved  Description: INTERVENTIONS:  - Identify patients at risk for skin breakdown  - Assess and monitor skin integrity  - Assess and monitor nutrition and hydration status  - Monitor labs   - Assess for incontinence   - Turn and reposition patient  - Assist with mobility/ambulation  - Relieve pressure over bony prominences  - Avoid friction and shearing  - Provide appropriate hygiene as needed including keeping skin clean and dry  - Evaluate need for skin moisturizer/barrier cream  - Collaborate with interdisciplinary team   - Patient/family teaching  - Consider wound care consult   Outcome: Progressing     Problem: PAIN - ADULT  Goal: Verbalizes/displays adequate comfort level or baseline comfort level  Description: Interventions:  - Encourage patient to monitor pain and request assistance  - Assess pain using appropriate pain scale  - Administer analgesics based on type and severity of pain and evaluate response  - Implement non-pharmacological measures as appropriate and evaluate response  - Consider cultural and social influences on pain and pain management  - Notify physician/advanced practitioner if interventions unsuccessful or patient reports new pain  Outcome: Progressing     Problem: INFECTION - ADULT  Goal: Absence or prevention of progression during hospitalization  Description: INTERVENTIONS:  - Assess and monitor for signs and symptoms of infection  - Monitor lab/diagnostic results  - Monitor all insertion sites, i.e. indwelling lines, tubes, and drains  - Monitor endotracheal if appropriate and nasal secretions for changes in amount and color  - Fraser appropriate cooling/warming therapies per order  - Administer medications as ordered  - Instruct and encourage patient and family to use good hand hygiene technique  - Identify and instruct in appropriate isolation precautions for  identified infection/condition  Outcome: Progressing     Problem: SAFETY ADULT  Goal: Patient will remain free of falls  Description: INTERVENTIONS:  - Educate patient/family on patient safety including physical limitations  - Instruct patient to call for assistance with activity   - Consult OT/PT to assist with strengthening/mobility   - Keep Call bell within reach  - Keep bed low and locked with side rails adjusted as appropriate  - Keep care items and personal belongings within reach  - Initiate and maintain comfort rounds  - Make Fall Risk Sign visible to staff  - Offer Toileting every  Hours, in advance of need  - Initiate/Maintain alarm  - Obtain necessary fall risk management equipment:  - Apply yellow socks and bracelet for high fall risk patients  - Consider moving patient to room near nurses station  Outcome: Progressing  Goal: Maintain or return to baseline ADL function  Description: INTERVENTIONS:  -  Assess patient's ability to carry out ADLs; assess patient's baseline for ADL function and identify physical deficits which impact ability to perform ADLs (bathing, care of mouth/teeth, toileting, grooming, dressing, etc.)  - Assess/evaluate cause of self-care deficits   - Assess range of motion  - Assess patient's mobility; develop plan if impaired  - Assess patient's need for assistive devices and provide as appropriate  - Encourage maximum independence but intervene and supervise when necessary  - Involve family in performance of ADLs  - Assess for home care needs following discharge   - Consider OT consult to assist with ADL evaluation and planning for discharge  - Provide patient education as appropriate  Outcome: Progressing     Problem: DISCHARGE PLANNING  Goal: Discharge to home or other facility with appropriate resources  Description: INTERVENTIONS:  - Identify barriers to discharge w/patient and caregiver  - Arrange for needed discharge resources and transportation as appropriate  - Identify  discharge learning needs (meds, wound care, etc.)  - Arrange for interpretive services to assist at discharge as needed  - Refer to Case Management Department for coordinating discharge planning if the patient needs post-hospital services based on physician/advanced practitioner order or complex needs related to functional status, cognitive ability, or social support system  Outcome: Progressing     Problem: Knowledge Deficit  Goal: Patient/family/caregiver demonstrates understanding of disease process, treatment plan, medications, and discharge instructions  Description: Complete learning assessment and assess knowledge base.  Interventions:  - Provide teaching at level of understanding  - Provide teaching via preferred learning methods  Outcome: Progressing

## 2024-03-25 NOTE — PROGRESS NOTES
Cardiology Team 2 - Progress Note     Vesna Langston 63 y.o. female MRN: [id number]    Unit/Bed#: [facility] 506-01 Encounter: [id number]          Assessment and Plan      Principal Problem:    ST elevation myocardial infarction involving left anterior descending (LAD) coronary artery (HCC)  Active Problems:    Atrial flutter (HCC)    VT (ventricular tachycardia) (HCC)    Hyperlipemia    New onset type 2 diabetes mellitus (HCC)    Tobacco abuse    Ischemic cardiomyopathy    Chronic low back pain    Assessment / Plan:  STEMI s/p NATHAN of proximal LAD  100% stenosis of proximal LAD s/p PCI intervision with NATHAN 3/22.     ASA 81 while inpatient for tripple therapy, may hold on dc  Atorvastatin 80mg qd  Plavix 75mg qd  Toprol-XL 50mg qd  Dc ARB given low am bp, may initiate on dc    VT--resolved  Afib  A Flutter  Initially in afib rvr  on admission to [facility] before going into VT sp cardioversion. Pt later went into A flutter with 1:1 then 2:1 conduction. Overnight Tele shows pt in aflutter 3/25. Now Amiodarone loaded with stable HR 80s.     Tele  Eliquis 5mg qd  B-Blocker as above  Amiodarone gtt   Mg >2.0, K > 4.0, replete prn    New HFrEF (35%)  3 weeks worsening SOB. No orthopnea. S/p 40mg IV lasix this am. No JVD. Crackles in lungs. Minimal edema bl legs. Hypervolemic on exam.    Lasix 40mg x 1 this pm; hold further am dose  Monitor for hypotensive episodes with diuresis  Strict I/o; daily standing weights    New Onset DM2  Dm2 diagnosis on admission with A1c 7.9.    SSI algo 2, goal glucose 140-180  Avoid hypoglycemic events    Tobacco Abuse  1 ppd. Unknown total pack years hx smoking.     Nicotine patches  Discuss cessation with pt     Chronic Lumbar radiculopathy on outpt Opioids  Hx of Recent SI joint injection per pt. On outpt Gabapentin 100mg BID, Norco 5-325 confirmed on PDMP 3/25.     Norco per outpt regimen  Tylenol prn mild pain  Gabapentin 100mg BID      Case discussed and reviewed with Dr. Garcia. Please wait for final  recommendations from Dr. Garcia.    Thank you for involving us in the care of your patient.      Subjective     Patient seen and examined.  Pt in Afib overnight on tele with suspected NSVT 7 beat run.  States PTA only symptoms was worsening SOB over 3 week period.  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 Patient states continued but improved fatigue today. No SOB. No CP / n/v/d.     Hospital Course:   Vesna Langston is a 63 y.o. year old female with a history of chronic low back pain on outpt opioids, tobacco abuse, and newly diagnosed DM2, hld who presented to [facility] 3/22 in aflutter with RVR s/p adenosine and returned initally to NSR. However, VT developed requiring Cardioversion. ST elevations seen and pt transported to [facility] cath and found to have 100% LAD occulusion. S/p DEX x2. Pt developed Aflutter in Cathlab and started on Amiodarone / Bblocker with return to NSR. Echo at that time showed EF 35%.        Vitals:  Temp:  [98.1 °F (36.7 °C)-98.8 °F (37.1 °C)] 98.3 °F (36.8 °C)  HR:  [75-97] 90  Resp:  [18] 18  BP: ()/(52-87) 140/87    Orthostatic Blood Pressures      Flowsheet Row Most Recent Value   Blood Pressure 140/87 filed at 03/25/2024 1139   Patient Position - Orthostatic VS Lying filed at 03/25/2024 0742              Intake and Outputs:  I/O         03/23 0701  03/24 0700 03/24 0701  03/25 0700 03/25 0701  03/26 0700    P.O. 960 600 60    I.V. (mL/kg) 443.1 (4.8) 380.5 (4.1) 45.1 (0.5)    Total Intake(mL/kg) 1403.1 (15.1) 980.5 (10.6) 105.1 (1.1)    Urine (mL/kg/hr) 5335 (2.4) 3450 (1.6) 300 (0.8)    Stool 0 1     Total Output 5335 3451 300    Net -3931.9 -2470.5 -194.9           Unmeasured Stool Occurrence 0 x                Objective     Physical Exam  Vitals and nursing note reviewed. Chaperone present: Nurse in room.   Constitutional:       General: She is not in acute distress.     Appearance: She is obese. She is not ill-appearing, toxic-appearing or diaphoretic.    HENT:      Mouth/Throat:      Mouth: Mucous membranes are moist.      Pharynx: Oropharynx is clear.   Eyes:      General: No scleral icterus.  Neck:      Comments: No JVD  Cardiovascular:      Rate and Rhythm: Normal rate and regular rhythm.      Heart sounds: No murmur heard.  Pulmonary:      Effort: Pulmonary effort is normal.      Breath sounds: Rales present.      Comments: Bilateral rales present  Abdominal:      General: Abdomen is flat. Bowel sounds are normal.   Musculoskeletal:      Right lower leg: Edema present.      Left lower leg: Edema present.      Comments: Trace bl edema   Skin:     General: Skin is warm and dry.   Neurological:      Mental Status: She is alert and oriented to person, place, and time. Mental status is at baseline.   Psychiatric:         Mood and Affect: Mood normal.         Behavior: Behavior normal.         Thought Content: Thought content normal.         Judgment: Judgment normal.           Current Facility-Administered Medications:     acetaminophen (TYLENOL) tablet 650 mg, 650 mg, Oral, Q4H PRN, Tasha Connolly, DO, 650 mg at 03/25/24 0742    amiodarone (CORDARONE) 900 mg in dextrose 5 % 500 mL infusion, 0.5 mg/min, Intravenous, Continuous, Tasha Connolly DO, Last Rate: 16.7 mL/hr at 03/25/24 0428, 0.5 mg/min at 03/25/24 0428    apixaban (ELIQUIS) tablet 5 mg, 5 mg, Oral, BID, MICHAEL Burrell-KARIN    aspirin chewable tablet 81 mg, 81 mg, Oral, Daily, Tasha Connolly, DO, 81 mg at 03/25/24 0846    atorvastatin (LIPITOR) tablet 80 mg, 80 mg, Oral, QPM, Tasha Connolly, DO, 80 mg at 03/24/24 1718    [COMPLETED] clopidogrel (PLAVIX) tablet 600 mg, 600 mg, Oral, Once, 600 mg at 03/23/24 2132 **FOLLOWED BY** clopidogrel (PLAVIX) tablet 75 mg, 75 mg, Oral, Daily, Tasha Connolly, DO, 75 mg at 03/25/24 0846    furosemide (LASIX) injection 40 mg, 40 mg, Intravenous, BID (diuretic), Dia Swartz DO, 40 mg at 03/25/24 1039    gabapentin (NEURONTIN) capsule 100 mg, 100 mg, Oral, BID, Tasha  id, DO, 100 mg at 03/25/24 0846    heparin (porcine) subcutaneous injection 5,000 Units, 5,000 Units, Subcutaneous, Q8H MONA, Tasha Connolly, DO, 5,000 Units at 03/25/24 0500    HYDROcodone-acetaminophen (NORCO) 5-325 mg per tablet 1 tablet, 1 tablet, Oral, BID PRN, Dia Swartz DO, 1 tablet at 03/25/24 1047    insulin lispro (HumALOG/ADMELOG) 100 units/mL subcutaneous injection 1-5 Units, 1-5 Units, Subcutaneous, HS, Tasha Connolly DO, 1 Units at 03/24/24 2128    insulin lispro (HumALOG/ADMELOG) 100 units/mL subcutaneous injection 1-6 Units, 1-6 Units, Subcutaneous, TID AC, 2 Units at 03/25/24 1042 **AND** Fingerstick Glucose (POCT), , , TID AC, Tasha Connolly DO    metoprolol succinate (TOPROL-XL) 24 hr tablet 50 mg, 50 mg, Oral, Daily, Tasha Connolly DO, 50 mg at 03/25/24 0846    nicotine (NICODERM CQ) 21 mg/24 hr TD 24 hr patch 21 mg, 21 mg, Transdermal, Daily, Tasha Connolly DO, 21 mg at 03/24/24 2129    Labs & Results:          Results from last 7 days   Lab Units 03/25/24  0606 03/24/24  1650 03/24/24  0604   POTASSIUM mmol/L 3.8 4.1 4.0   CO2 mmol/L 25 22 24   CHLORIDE mmol/L 100 101 102   BUN mg/dL 23 22 20   CREATININE mg/dL 0.87 0.83 0.81   EGFR ml/min/1.73sq m 70 74 76     Results from last 7 days   Lab Units 03/23/24  0549 03/22/24  1822   AST U/L 52* 55*   ALT U/L 44 56*   ALK PHOS U/L 44 49   TOTAL PROTEIN g/dL 6.7 7.1   ALBUMIN g/dL 4.0 4.4   TOTAL BILIRUBIN mg/dL 0.76 0.64         Results from last 7 days   Lab Units 03/25/24  0451 03/24/24  0604 03/23/24  0549   HEMOGLOBIN g/dL 14.8 14.3 15.0   HEMATOCRIT % 44.8 43.9 45.9   PLATELETS Thousands/uL 312 331 326     Results from last 7 days   Lab Units 03/22/24  2156 03/22/24  1822   TRIGLYCERIDES mg/dL  --  185*   HDL mg/dL  --  50   LDL CALC mg/dL  --  236*   HEMOGLOBIN A1C % 7.9*  --            Cardiac Imaging/Monitoring       Telemetry:   Personally reviewed by Dia Swartz DO: Aflutter 1:1 to 2:1 overnight. Possible 7 beet run NSVT      Imaging: CXR 3/22/24--Pulm Edema      EKG:  Date:  Interpretation:    Encounter Date: 03/22/24   ECG 12 lead   Result Value    Ventricular Rate 123    Atrial Rate 293    IN Interval     QRSD Interval 84    QT Interval 334    QTC Interval 478    P Axis     QRS Axis 77    T Wave Axis 70    Narrative    Atrial flutter with variable A-V block  Cannot rule out Low voltage QRS  Septal infarct (cited on or before 22-MAR-2024)  Abnormal ECG  When compared with ECG of 22-MAR-2024 21:37,  Atrial flutter has replaced Sinus rhythm  Confirmed by Raudel Pickens ([id number]) on 3/24/2024 3:59:40 PM         Echo: No results found for this or any previous visit.        VTE Prophylaxis: Sequential compression device (Venodyne)  Dominick Swartz, DO  Internal Medicine Residency PGY-1  [facility]      ** Please Note: Voice dictation software may have been used in the creation of this document. **

## 2024-03-25 NOTE — UTILIZATION REVIEW
NOTIFICATION OF INPATIENT ADMISSION      AUTHORIZATION REQUEST   SERVICING FACILITY:   [facility]  Address: [address] [place] [address]  Tax ID: [id number]  NPI: [id number] ATTENDING PROVIDER:  Attending Name and NPI#: Gabriel Myers Md [[id number]]  Address: [address] [place] [address]  Phone: [phone]   ADMISSION INFORMATION:  Place of Service: Inpatient [facility]  Place of Service Code: 21  Inpatient Admission Date/Time: 3/22/24  8:56 PM  Discharge Date/Time: No discharge date for patient encounter.  Admitting Diagnosis Code/Description:  STEMI (ST elevation myocardial infarction) ([facility]) [I21.3]     UTILIZATION REVIEW CONTACT:  Tracy Bonilla, Utilization   Network Utilization Review Department  Phone: [phone]  Fax: [phone]  Email: [email or web address]  Contact for approvals/pending authorizations, clinical reviews, and discharge.     PHYSICIAN ADVISORY SERVICES:  Medical Necessity Denial & Wdjt-[email or web address] Review  Phone: [phone]  Fax: [phone]  Email: [email or web address]     DISCHARGE SUPPORT TEAM:  For Patients Discharge Needs & Updates  Phone: [phone] opt. 2 Fax: [phone]  Email: [email or web address]

## 2024-03-25 NOTE — PROGRESS NOTES
Progress Note - Electrophysiology  Vesna Langston 63 y.o. female MRN: [id number]  Unit/Bed#: [facility] 506-01 Encounter: [id number]      Assessment:  STEMI  - cardiac cath 3/2024 showing single-vessel CAD with ostial total occlusion of LAD status post IVUS guided PCI to ostial/proximal LAD and placement of NATHAN  - had been felt this was due to chronic scar rather than acute thrombus given marked difficulty crossing total occlusion with a wire and resistance to opening with balloon dilation  - now started on aspirin, Plavix, atorvastatin, and metoprolol succinate (triple therapy with Eliquis for only 1 week post cath)  Ventricular tachycardia  - being loaded with IV amiodarone  - beta blocker therapy of metoprolol succinate  Atrial flutter and some concern for afib, paroxysmal   - anticoagulated with Eliquis / [id number] score of 4 (age, sex, CHF, DM)  - EF of 35% per echo 3/2024 /moderate dilation of left atrium noted  - rate control: metoprolol succinate  - antiarrhythmic therapy: amiodarone, see #2  - prior cardioversion: none  - prior ablation: none  Ischemic cardiomyopathy  - EF of 35% per echo 3/23/2024, repeat echo pending today  - now started on GDMT of metoprolol succinate and losartan  - diuretic regimen of IV Lasix currently  Newly diagnosed diabetes mellitus  Hyperlipidemia  - now maintained on atorvastatin 80 mg daily  Tobacco use    Plan:  It is felt that patient qualifies for ICD as she had monomorphic ventricular tachycardia that was felt to be due to scar and will still need protection from this.  She also qualifies for atrial lead as she now has atrial arrhythmias as well that require antiarrhythmic therapy along with rate control medications in the future.  She has not had prior surgeries to her left upper chest and is not allergic to penicillins.  Will need to be prepped as she does have an iodine allergy.    We discussed how we would like to implant defibrillator tomorrow but she does need to have cardiac  MRI done prior (this would help in case VT needs further intervention such as ablation in the future to look for area of scar).  Did discuss with cardiology team as they were awaiting that they were closer to euvolemia prior to putting her through MRI.    We discussed with her that we will make her fasting after midnight in anticipation of having cardiac MRI done today or overnight.  She is understanding that device will need to follow this.    Will hold morning dose of Eliquis and restart if device is not able to be accommodated tomorrow.  Will also discuss with primary team but likely does not need to be on aspirin, Plavix, Eliquis, and heparin altogether.    Did also discuss with her long-term management of atrial fibrillation/flutter that she would benefit from potentially ablation and also changing her antiarrhythmic from amiodarone that has long-term toxicities to something such as dofetilide or sotalol.  However, she can follow-up with EP in the office to discuss those changes.    Subjective/Objective   Subjective: Patient is a pleasant 63-year-old female with STEMI status post NATHAN, ischemic cardiomyopathy, VT, and atrial arrhythmias, newly diagnosed diabetes mellitus, hyperlipidemia, and tobacco use.    Patient presented as a STEMI 3/22 transferred from [facility] after she presented with syncopal episode and had PCI to proximal LAD.  She reported that she was getting steroid injection for lower back issues.  At that time patient was also noted to have arrhythmias, was given amiodarone bolus for ventricular tachycardia that was felt to be in the setting of ischemia and also once in the Cath Lab.  On presentation to the Cath Lab was also noted to be in 2: 1 atrial flutter that converted during procedure but had received adenosine when at [facility].    She has been doing well from a CAD standpoint.  She has been in sinus with small burst of atrial flutter and potentially atrial fibrillation, did have a little bit of atrial  "flutter this morning at 1 AM but has since been in sinus since roughly 2/23.    Offers no significant complaints today.    EKG:   Flutter -       Concern for afib -       VT -         Objective:  Vitals: /60   Pulse 75   Temp 98.3 °F (36.8 °C) (Oral)   Resp 18   Ht 5' 8\" (1.727 m)   Wt 92.3 kg (203 lb 7.8 oz)   SpO2 98%   BMI 30.94 kg/m²     Vitals:    03/24/24 0256 03/25/24 0313   Weight: 93.1 kg (205 lb 4 oz) 92.3 kg (203 lb 7.8 oz)     Orthostatic Blood Pressures      Flowsheet Row Most Recent Value   Blood Pressure 107/60 filed at 03/25/2024 0745   Patient Position - Orthostatic VS Lying filed at 03/25/2024 0742              Intake/Output Summary (Last 24 hours) at 3/25/2024 0927  Last data filed at 3/25/2024 0913  Gross per 24 hour   Intake 880.8 ml   Output 2551 ml   Net -1670.2 ml       Invasive Devices       Peripheral Intravenous Line  Duration             Peripheral IV 03/24/24 Dorsal (posterior);Left Forearm 1 day    Peripheral IV 03/25/24 Distal;Dorsal (posterior);Right Forearm <1 day                              Scheduled Meds:  Current Facility-Administered Medications   Medication Dose Route Frequency Provider Last Rate    acetaminophen  650 mg Oral Q4H PRN Tasha Magid, DO      amiodarone (CORDARONE) 900 mg in dextrose 5 % 500 mL infusion  0.5 mg/min Intravenous Continuous Tasha Magid, DO 0.5 mg/min (03/25/24 0428)    apixaban  5 mg Oral BID Tasha Magid, DO      aspirin  81 mg Oral Daily Tasha Magid, DO      atorvastatin  80 mg Oral QPM Tasha Magid, DO      clopidogrel  75 mg Oral Daily Tasha Magid, DO      furosemide  40 mg Intravenous BID (diuretic) Beronica Ledbetter MD      gabapentin  100 mg Oral BID Tasha Magid, DO      heparin (porcine)  5,000 Units Subcutaneous Q8H MONA Tasha Magid, DO      insulin lispro  1-5 Units Subcutaneous HS Tasha Magid, DO      insulin lispro  1-6 Units Subcutaneous TID AC Tasha Alasid, DO      losartan  25 mg Oral Daily Criselda Flynn, " MD      metoprolol succinate  50 mg Oral Daily Tasha Connolly DO      nicotine  21 mg Transdermal Daily Tasha Connolly DO       Continuous Infusions:amiodarone (CORDARONE) 900 mg in dextrose 5 % 500 mL infusion, 0.5 mg/min, Last Rate: 0.5 mg/min (03/25/24 0428)      PRN Meds:.  acetaminophen    Review of Systems:  ROS  ROS as noted above, otherwise 12 point review of systems was performed and is negative.     Physical Exam:   Physical Exam  Vitals and nursing note reviewed.   Constitutional:       General: She is not in acute distress.     Appearance: Normal appearance. She is well-developed. She is not diaphoretic.   HENT:      Head: Normocephalic and atraumatic.   Eyes:      Pupils: Pupils are equal, round, and reactive to light.   Neck:      Vascular: No JVD.   Cardiovascular:      Rate and Rhythm: Normal rate and regular rhythm.      Heart sounds: No murmur heard.     No friction rub. No gallop.   Pulmonary:      Effort: Pulmonary effort is normal. No respiratory distress.      Breath sounds: No wheezing.      Comments: Decreased breath sounds bilaterally at bases  Abdominal:      Palpations: Abdomen is soft.   Musculoskeletal:         General: Normal range of motion.      Cervical back: Normal range of motion.   Skin:     General: Skin is warm and dry.   Neurological:      Mental Status: She is alert and oriented to person, place, and time.   Psychiatric:         Mood and Affect: Mood normal.         Behavior: Behavior normal.                   Lab Results: I have personally reviewed pertinent lab results.    Results from last 7 days   Lab Units 03/25/24  0451 03/24/24  0604 03/23/24  0549   WBC Thousand/uL 12.03* 17.66* 18.62*   HEMOGLOBIN g/dL 14.8 14.3 15.0   HEMATOCRIT % 44.8 43.9 45.9   PLATELETS Thousands/uL 312 331 326     Results from last 7 days   Lab Units 03/25/24  0606 03/24/24  1650 03/24/24  0604   POTASSIUM mmol/L 3.8 4.1 4.0   CHLORIDE mmol/L 100 101 102   CO2 mmol/L 25 22 24   BUN mg/dL 23 22  20   CREATININE mg/dL 0.87 0.83 0.81   CALCIUM mg/dL 9.1 9.0 9.2         Results from last 7 days   Lab Units 03/25/24  0606 03/24/24  1650 03/24/24  0604   MAGNESIUM mg/dL 2.0 2.0 2.1       Imaging: I have personally reviewed pertinent reports.    No results found for this or any previous visit.      VTE Pharmacologic Prophylaxis: Eliquis  VTE Mechanical Prophylaxis: sequential compression device

## 2024-03-25 NOTE — QUICK NOTE
Called patients daughter, Laura, updating them about ongoing treatment plan and any patient updates.      Dia Swartz DO  Internal Medicine Residency PGY-1  [facility] [place]  Available on [facility]

## 2024-03-25 NOTE — PLAN OF CARE
Problem: Prexisting or High Potential for Compromised Skin Integrity  Goal: Skin integrity is maintained or improved  Description: INTERVENTIONS:  - Identify patients at risk for skin breakdown  - Assess and monitor skin integrity  - Assess and monitor nutrition and hydration status  - Monitor labs   - Assess for incontinence   - Turn and reposition patient  - Assist with mobility/ambulation  - Relieve pressure over bony prominences  - Avoid friction and shearing  - Provide appropriate hygiene as needed including keeping skin clean and dry  - Evaluate need for skin moisturizer/barrier cream  - Collaborate with interdisciplinary team   - Patient/family teaching  - Consider wound care consult   Outcome: Progressing     Problem: PAIN - ADULT  Goal: Verbalizes/displays adequate comfort level or baseline comfort level  Description: Interventions:  - Encourage patient to monitor pain and request assistance  - Assess pain using appropriate pain scale  - Administer analgesics based on type and severity of pain and evaluate response  - Implement non-pharmacological measures as appropriate and evaluate response  - Consider cultural and social influences on pain and pain management  - Notify physician/advanced practitioner if interventions unsuccessful or patient reports new pain  Outcome: Progressing     Problem: INFECTION - ADULT  Goal: Absence or prevention of progression during hospitalization  Description: INTERVENTIONS:  - Assess and monitor for signs and symptoms of infection  - Monitor lab/diagnostic results  - Monitor all insertion sites, i.e. indwelling lines, tubes, and drains  - Monitor endotracheal if appropriate and nasal secretions for changes in amount and color  - [place] appropriate cooling/warming therapies per order  - Administer medications as ordered  - Instruct and encourage patient and family to use good hand hygiene technique  - Identify and instruct in appropriate isolation precautions for  identified infection/condition  Outcome: Progressing     Problem: SAFETY ADULT  Goal: Patient will remain free of falls  Description: INTERVENTIONS:  - Educate patient/family on patient safety including physical limitations  - Instruct patient to call for assistance with activity   - Consult OT/PT to assist with strengthening/mobility   - Keep Call bell within reach  - Keep bed low and locked with side rails adjusted as appropriate  - Keep care items and personal belongings within reach  - Initiate and maintain comfort rounds  - Make Fall Risk Sign visible to staff  - Apply yellow socks and bracelet for high fall risk patients  - Consider moving patient to room near nurses station  Outcome: Progressing  Goal: Maintain or return to baseline ADL function  Description: INTERVENTIONS:  -  Assess patient's ability to carry out ADLs; assess patient's baseline for ADL function and identify physical deficits which impact ability to perform ADLs (bathing, care of mouth/teeth, toileting, grooming, dressing, etc.)  - Assess/evaluate cause of self-care deficits   - Assess range of motion  - Assess patient's mobility; develop plan if impaired  - Assess patient's need for assistive devices and provide as appropriate  - Encourage maximum independence but intervene and supervise when necessary  - Involve family in performance of ADLs  - Assess for home care needs following discharge   - Consider OT consult to assist with ADL evaluation and planning for discharge  - Provide patient education as appropriate  Outcome: Progressing     Problem: DISCHARGE PLANNING  Goal: Discharge to home or other facility with appropriate resources  Description: INTERVENTIONS:  - Identify barriers to discharge w/patient and caregiver  - Arrange for needed discharge resources and transportation as appropriate  - Identify discharge learning needs (meds, wound care, etc.)  - Arrange for interpretive services to assist at discharge as needed  - Refer to  Case Management Department for coordinating discharge planning if the patient needs post-hospital services based on physician/advanced practitioner order or complex needs related to functional status, cognitive ability, or social support system  Outcome: Progressing     Problem: Knowledge Deficit  Goal: Patient/family/caregiver demonstrates understanding of disease process, treatment plan, medications, and discharge instructions  Description: Complete learning assessment and assess knowledge base.  Interventions:  - Provide teaching at level of understanding  - Provide teaching via preferred learning methods  Outcome: Progressing

## 2024-03-26 ENCOUNTER — APPOINTMENT (OUTPATIENT)
Dept: RADIOLOGY | Facility: HOSPITAL | Age: 64
DRG: 222 | End: 2024-03-26
Payer: COMMERCIAL

## 2024-03-26 LAB
ANION GAP SERPL CALCULATED.3IONS-SCNC: 11 MMOL/L (ref 4–13)
ATRIAL RATE: 82 BPM
BASOPHILS # BLD AUTO: 0.07 THOUSANDS/ÂΜL (ref 0–0.1)
BASOPHILS NFR BLD AUTO: 1 % (ref 0–1)
BUN SERPL-MCNC: 20 MG/DL (ref 5–25)
CALCIUM SERPL-MCNC: 9.4 MG/DL (ref 8.4–10.2)
CHLORIDE SERPL-SCNC: 97 MMOL/L (ref 96–108)
CO2 SERPL-SCNC: 25 MMOL/L (ref 21–32)
CREAT SERPL-MCNC: 0.88 MG/DL (ref 0.6–1.3)
EOSINOPHIL # BLD AUTO: 0.26 THOUSAND/ÂΜL (ref 0–0.61)
EOSINOPHIL NFR BLD AUTO: 2 % (ref 0–6)
ERYTHROCYTE [DISTWIDTH] IN BLOOD BY AUTOMATED COUNT: 13.9 % (ref 11.6–15.1)
GFR SERPL CREATININE-BSD FRML MDRD: 69 ML/MIN/1.73SQ M
GLUCOSE SERPL-MCNC: 151 MG/DL (ref 65–140)
GLUCOSE SERPL-MCNC: 163 MG/DL (ref 65–140)
GLUCOSE SERPL-MCNC: 188 MG/DL (ref 65–140)
GLUCOSE SERPL-MCNC: 193 MG/DL (ref 65–140)
GLUCOSE SERPL-MCNC: 209 MG/DL (ref 65–140)
HCT VFR BLD AUTO: 48.1 % (ref 34.8–46.1)
HGB BLD-MCNC: 15.9 G/DL (ref 11.5–15.4)
IMM GRANULOCYTES # BLD AUTO: 0.04 THOUSAND/UL (ref 0–0.2)
IMM GRANULOCYTES NFR BLD AUTO: 0 % (ref 0–2)
LYMPHOCYTES # BLD AUTO: 3.59 THOUSANDS/ÂΜL (ref 0.6–4.47)
LYMPHOCYTES NFR BLD AUTO: 29 % (ref 14–44)
MAGNESIUM SERPL-MCNC: 2.2 MG/DL (ref 1.9–2.7)
MCH RBC QN AUTO: 30 PG (ref 26.8–34.3)
MCHC RBC AUTO-ENTMCNC: 33.1 G/DL (ref 31.4–37.4)
MCV RBC AUTO: 91 FL (ref 82–98)
MONOCYTES # BLD AUTO: 1.17 THOUSAND/ÂΜL (ref 0.17–1.22)
MONOCYTES NFR BLD AUTO: 10 % (ref 4–12)
NEUTROPHILS # BLD AUTO: 7.15 THOUSANDS/ÂΜL (ref 1.85–7.62)
NEUTS SEG NFR BLD AUTO: 58 % (ref 43–75)
NRBC BLD AUTO-RTO: 0 /100 WBCS
P AXIS: 66 DEGREES
PHOSPHATE SERPL-MCNC: 4.5 MG/DL (ref 2.3–4.1)
PLATELET # BLD AUTO: 324 THOUSANDS/UL (ref 149–390)
PMV BLD AUTO: 9.8 FL (ref 8.9–12.7)
POTASSIUM SERPL-SCNC: 3.9 MMOL/L (ref 3.5–5.3)
PR INTERVAL: 156 MS
QRS AXIS: 69 DEGREES
QRSD INTERVAL: 82 MS
QT INTERVAL: 438 MS
QTC INTERVAL: 511 MS
RBC # BLD AUTO: 5.3 MILLION/UL (ref 3.81–5.12)
SODIUM SERPL-SCNC: 133 MMOL/L (ref 135–147)
T WAVE AXIS: 84 DEGREES
VENTRICULAR RATE: 82 BPM
WBC # BLD AUTO: 12.28 THOUSAND/UL (ref 4.31–10.16)

## 2024-03-26 PROCEDURE — 93010 ELECTROCARDIOGRAM REPORT: CPT | Performed by: INTERNAL MEDICINE

## 2024-03-26 PROCEDURE — 83735 ASSAY OF MAGNESIUM: CPT | Performed by: INTERNAL MEDICINE

## 2024-03-26 PROCEDURE — 85025 COMPLETE CBC W/AUTO DIFF WBC: CPT | Performed by: INTERNAL MEDICINE

## 2024-03-26 PROCEDURE — 99233 SBSQ HOSP IP/OBS HIGH 50: CPT | Performed by: INTERNAL MEDICINE

## 2024-03-26 PROCEDURE — 99232 SBSQ HOSP IP/OBS MODERATE 35: CPT | Performed by: INTERNAL MEDICINE

## 2024-03-26 PROCEDURE — 82948 REAGENT STRIP/BLOOD GLUCOSE: CPT

## 2024-03-26 PROCEDURE — 93005 ELECTROCARDIOGRAM TRACING: CPT

## 2024-03-26 PROCEDURE — 84100 ASSAY OF PHOSPHORUS: CPT | Performed by: INTERNAL MEDICINE

## 2024-03-26 PROCEDURE — 80048 BASIC METABOLIC PNL TOTAL CA: CPT | Performed by: INTERNAL MEDICINE

## 2024-03-26 PROCEDURE — A9585 GADOBUTROL INJECTION: HCPCS | Performed by: INTERNAL MEDICINE

## 2024-03-26 PROCEDURE — 75561 CARDIAC MRI FOR MORPH W/DYE: CPT

## 2024-03-26 RX ORDER — GADOBUTROL 604.72 MG/ML
20 INJECTION INTRAVENOUS
Status: COMPLETED | OUTPATIENT
Start: 2024-03-26 | End: 2024-03-26

## 2024-03-26 RX ORDER — POTASSIUM CHLORIDE 20 MEQ/1
20 TABLET, EXTENDED RELEASE ORAL ONCE
Status: COMPLETED | OUTPATIENT
Start: 2024-03-26 | End: 2024-03-26

## 2024-03-26 RX ORDER — ONDANSETRON 4 MG/1
4 TABLET, ORALLY DISINTEGRATING ORAL ONCE
Status: COMPLETED | OUTPATIENT
Start: 2024-03-26 | End: 2024-03-26

## 2024-03-26 RX ORDER — AMIODARONE HYDROCHLORIDE 200 MG/1
200 TABLET ORAL
Status: DISCONTINUED | OUTPATIENT
Start: 2024-03-26 | End: 2024-03-28 | Stop reason: HOSPADM

## 2024-03-26 RX ADMIN — APIXABAN 5 MG: 5 TABLET, FILM COATED ORAL at 13:29

## 2024-03-26 RX ADMIN — INSULIN LISPRO 2 UNITS: 100 INJECTION, SOLUTION INTRAVENOUS; SUBCUTANEOUS at 16:51

## 2024-03-26 RX ADMIN — POTASSIUM CHLORIDE 20 MEQ: 1500 TABLET, EXTENDED RELEASE ORAL at 10:06

## 2024-03-26 RX ADMIN — INSULIN LISPRO 1 UNITS: 100 INJECTION, SOLUTION INTRAVENOUS; SUBCUTANEOUS at 21:16

## 2024-03-26 RX ADMIN — ACETAMINOPHEN 650 MG: 325 TABLET, FILM COATED ORAL at 21:47

## 2024-03-26 RX ADMIN — ONDANSETRON 4 MG: 4 TABLET, ORALLY DISINTEGRATING ORAL at 10:06

## 2024-03-26 RX ADMIN — ASPIRIN 81 MG CHEWABLE TABLET 81 MG: 81 TABLET CHEWABLE at 08:02

## 2024-03-26 RX ADMIN — GADOBUTROL 20 ML: 604.72 INJECTION INTRAVENOUS at 12:36

## 2024-03-26 RX ADMIN — AMIODARONE HYDROCHLORIDE 200 MG: 200 TABLET ORAL at 16:51

## 2024-03-26 RX ADMIN — GABAPENTIN 100 MG: 100 CAPSULE ORAL at 16:51

## 2024-03-26 RX ADMIN — AMIODARONE HYDROCHLORIDE 0.5 MG/MIN: 50 INJECTION, SOLUTION INTRAVENOUS at 09:37

## 2024-03-26 RX ADMIN — ATORVASTATIN CALCIUM 80 MG: 80 TABLET, FILM COATED ORAL at 16:51

## 2024-03-26 RX ADMIN — METOPROLOL SUCCINATE 50 MG: 50 TABLET, EXTENDED RELEASE ORAL at 08:02

## 2024-03-26 RX ADMIN — CLOPIDOGREL BISULFATE 75 MG: 75 TABLET ORAL at 08:02

## 2024-03-26 NOTE — QUICK NOTE
Called patients daughter, Laura, updating them about ongoing treatment plan and any patient updates.    Daughter requests to be updated with known procedure time if able in morning as family would like to be present.       Dia Swartz DO  Internal Medicine Residency PGY-1  [facility]  Available on [employer]

## 2024-03-26 NOTE — DISCHARGE INSTR - AVS FIRST PAGE
Please follow-up with your PCP within 7-14 days of discharge for transitional care visit  Please follow-up with Heart Failure medicine within 7 days of discharge  Please follow-up at your scheduled cardiology EP visit as listed below; further instructions regarding post ICD care listed below  Please change diet to mediterranean diet and stop smoking for better recovery  You may wear compression stocking for leg swelling as needed.  We will be discharging you with the following changes to your medications:  Amiodarone for your abnormal heart rhythm--take 200mg three times daily for 7 days then decrease for 200mg daily  Eliquis for clot prevention in abnormal rhythm  Aspirin 81 / Atorvastatin 80mg / Plavix / Metoprolol for your coronary artery disease  Lasix, Entresto (and metoprolol) for your heart failure (also known as heart failure with reduced ejection fracture); your cardiologist may add further medications if your lower blood pressure can tolerate  Your PCP will determine if any outpatient diabetic medications should be started  Nicotine patches; you can discuss additional anti-smoking medications with your PCP  Please take your second daily dose of the eliquis, third dose of amiodarone, and your atorvastatin tonight after discharge  Please obtain the Lab work for Basic Metabolic Panel in 7 days    ICD INSTRUCTIONS:  Please refer to post ICD implantation discharge instructions and restrictions below and your ICD booklet/temporary card.     Keep incision dry for one week. Do not use lotions/powders/creams on incision.     Leave outer bandage in place for 1 week - it is water proof, and as long as it is fully adhered to your skin you may shower with it.  If it appears as though the bandage is coming off and/or there is any communication to the area of device incision, please then keep the whole area dry for the remaining week.  After 1 week, please remove by pulling all edges away from the center of the  bandage.    No overhead reaching/pushing/pulling/lifting greater than 5-10lbs with left arm for six weeks. Please call the office if you notice redness, swelling, bleeding, or drainage from incision or if you develop fevers    Implantable Cardioverter Defibrillator      If you have any questions, please call [phone] to speak with a nurse (8:30am-4pm, or [phone] after hours). For appointments, please call [phone].    WHAT YOU SHOULD KNOW:    An implantable cardioverter defibrillator (ICD) is a small device that monitors your heart rate and rhythm. It is commonly placed inside your upper chest region. It may be used if you have a ventricular arrhythmia, which is an irregular, dangerous rhythm from the bottom chamber of your heart. Some arrhythmias may cause your heart to suddenly stop beating. An ICD can give a shock to your heart to make it start beating again, or it can give pacing therapy (also known as pain-free therapy) to return your heart to normal rhythm. It is also a pacemaker, so it will pace your heart if needed to prevent it from beating too slowly.           AFTER YOU LEAVE:      Follow up with your primary healthcare provider or cardiologist as directed: You will need to follow-up to have your ICD checked and make sure you are not having problems. Write down your questions so you remember to ask them during your visits.    Driving: you are ok to drive 48 hours after device is implanted     Self-care:   Ask about activity: Ask if you need to avoid moving your shoulder or arm, and for how long. Ask if you should avoid lifting heavy objects. Do not play any contact sports, such as football or wrestling, until your primary healthcare provider (PHP) or cardiologist tells you it is okay. You may only be able to drive for a certain amount of time per day, or during certain hours. Ask when you can return to work.   Care for your skin over the ICD: see instructions above     When you get a shock  from your ICD: A shock may feel like someone has hit you, or you may feel a thump in the chest. If someone is touching you when you get a shock, they may feel a tingling feeling. The first time you feel a shock, it may scare you. Sit or lie down and stay calm. Ask someone to stay with you if possible. Please either call your cardiologist or report to an emergency room.    Safety instructions when you have an ICD:   Carry an ID card for the ICD: This card has important information about your ICD.    Stay away from magnets or machines with electric fields: This includes MRI machines. Avoid leaning into a car engine or doing welding. These things can interfere with how your ICD works.    Tell airport security you have an ICD: You may need to be searched by hand when you go through a security gate. The security gate or handheld wand could harm your ICD.    Keep an ICD diary: Record when you get a shock and what you were doing before you got the shock. Keep track of how you felt before and after the shock, as well as how many shocks you received. Write down the day and time of each shock. Bring the diary with you when you see your PHP or cardiologist.   Carry medical alert identification: Wear medical alert jewelry or carry a card that says you have an ICD. Ask your PHP or cardiologist where to get these items.    Contact your primary healthcare provider or cardiologist if:   You have a fever.    You feel 1 or more shocks from your ICD and feel fine afterwards.   Your feet or ankles swell.   The area around your ICD is painful or tender after surgery.   The skin around your stitches or staples is red, swollen, or draining pus or fluid.    You have chills, a cough, and feel weak or achy.    You are sad or anxious and find it hard to do your usual activities.   You have questions or concerns about your condition or care.    Seek care immediately or call 911 if:   Your stitches or staples come apart.   Blood soaks through  your bandage.   You feel more than 3 shocks in a row from your ICD.   You become weak, dizzy, or faint.   You feel your heart skip beats or beat very fast or slow, but you do not feel a shock from your ICD.   You have chest pain that does not go away with rest or medicine.        © 2014 [email or web address]. Information is for End User's use only and may not be sold, redistributed or otherwise used for commercial purposes. All illustrations and images included in CareNotes® are the copyrighted property of [employer], [employer]. or [employer].  The above information is an  only. It is not intended as medical advice for individual conditions or treatments. Talk to your doctor, nurse or pharmacist before following any medical regimen to see if it is safe and effective for you.

## 2024-03-26 NOTE — PLAN OF CARE
Problem: Prexisting or High Potential for Compromised Skin Integrity  Goal: Skin integrity is maintained or improved  Description: INTERVENTIONS:  - Identify patients at risk for skin breakdown  - Assess and monitor skin integrity  - Assess and monitor nutrition and hydration status  - Monitor labs   - Assess for incontinence   - Turn and reposition patient  - Assist with mobility/ambulation  - Relieve pressure over bony prominences  - Avoid friction and shearing  - Provide appropriate hygiene as needed including keeping skin clean and dry  - Evaluate need for skin moisturizer/barrier cream  - Collaborate with interdisciplinary team   - Patient/family teaching  - Consider wound care consult   Outcome: Progressing     Problem: PAIN - ADULT  Goal: Verbalizes/displays adequate comfort level or baseline comfort level  Description: Interventions:  - Encourage patient to monitor pain and request assistance  - Assess pain using appropriate pain scale  - Administer analgesics based on type and severity of pain and evaluate response  - Implement non-pharmacological measures as appropriate and evaluate response  - Consider cultural and social influences on pain and pain management  - Notify physician/advanced practitioner if interventions unsuccessful or patient reports new pain  Outcome: Progressing     Problem: INFECTION - ADULT  Goal: Absence or prevention of progression during hospitalization  Description: INTERVENTIONS:  - Assess and monitor for signs and symptoms of infection  - Monitor lab/diagnostic results  - Monitor all insertion sites, i.e. indwelling lines, tubes, and drains  - Monitor endotracheal if appropriate and nasal secretions for changes in amount and color  - [place] appropriate cooling/warming therapies per order  - Administer medications as ordered  - Instruct and encourage patient and family to use good hand hygiene technique  - Identify and instruct in appropriate isolation precautions for  identified infection/condition  Outcome: Progressing     Problem: SAFETY ADULT  Goal: Patient will remain free of falls  Description: INTERVENTIONS:  - Educate patient/family on patient safety including physical limitations  - Instruct patient to call for assistance with activity   - Consult OT/PT to assist with strengthening/mobility   - Keep Call bell within reach  - Keep bed low and locked with side rails adjusted as appropriate  - Keep care items and personal belongings within reach  - Initiate and maintain comfort rounds  - Make Fall Risk Sign visible to staff  - Offer Toileting every  Hours, in advance of need  - Initiate/Maintain alarm  - Obtain necessary fall risk management equipment:  - Apply yellow socks and bracelet for high fall risk patients  - Consider moving patient to room near nurses station  Outcome: Progressing  Goal: Maintain or return to baseline ADL function  Description: INTERVENTIONS:  -  Assess patient's ability to carry out ADLs; assess patient's baseline for ADL function and identify physical deficits which impact ability to perform ADLs (bathing, care of mouth/teeth, toileting, grooming, dressing, etc.)  - Assess/evaluate cause of self-care deficits   - Assess range of motion  - Assess patient's mobility; develop plan if impaired  - Assess patient's need for assistive devices and provide as appropriate  - Encourage maximum independence but intervene and supervise when necessary  - Involve family in performance of ADLs  - Assess for home care needs following discharge   - Consider OT consult to assist with ADL evaluation and planning for discharge  - Provide patient education as appropriate  Outcome: Progressing     Problem: DISCHARGE PLANNING  Goal: Discharge to home or other facility with appropriate resources  Description: INTERVENTIONS:  - Identify barriers to discharge w/patient and caregiver  - Arrange for needed discharge resources and transportation as appropriate  - Identify  discharge learning needs (meds, wound care, etc.)  - Arrange for interpretive services to assist at discharge as needed  - Refer to Case Management Department for coordinating discharge planning if the patient needs post-hospital services based on physician/advanced practitioner order or complex needs related to functional status, cognitive ability, or social support system  Outcome: Progressing     Problem: Knowledge Deficit  Goal: Patient/family/caregiver demonstrates understanding of disease process, treatment plan, medications, and discharge instructions  Description: Complete learning assessment and assess knowledge base.  Interventions:  - Provide teaching at level of understanding  - Provide teaching via preferred learning methods  Outcome: Progressing     Problem: Nutrition/Hydration-ADULT  Goal: Nutrient/Hydration intake appropriate for improving, restoring or maintaining nutritional needs  Description: Monitor and assess patient's nutrition/hydration status for malnutrition. Collaborate with interdisciplinary team and initiate plan and interventions as ordered.  Monitor patient's weight and dietary intake as ordered or per policy. Utilize nutrition screening tool and intervene as necessary. Determine patient's food preferences and provide high-protein, high-caloric foods as appropriate.     INTERVENTIONS:  - Monitor oral intake, urinary output, labs, and treatment plans  - Assess nutrition and hydration status and recommend course of action  - Evaluate amount of meals eaten  - Assist patient with eating if necessary   - Allow adequate time for meals  - Recommend/ encourage appropriate diets, oral nutritional supplements, and vitamin/mineral supplements  - Order, calculate, and assess calorie counts as needed  - Recommend, monitor, and adjust tube feedings and TPN/PPN based on assessed needs  - Assess need for intravenous fluids  - Provide specific nutrition/hydration education as appropriate  - Include  patient/family/caregiver in decisions related to nutrition  Outcome: Progressing

## 2024-03-26 NOTE — PROGRESS NOTES
Progress Note - Electrophysiology - Cardiology  Vesna Langston 63 y.o. female MRN: [id number]  Unit/Bed#: [facility] 506-01 Encounter: [id number]      Assessment:  STEMI  - cardiac cath 3/2024 showing single-vessel CAD with ostial total occlusion of LAD status post IVUS guided PCI to ostial/proximal LAD and placement of NATHAN  - had been felt this was due to chronic scar rather than acute thrombus given marked difficulty crossing total occlusion with a wire and resistance to opening with balloon dilation  - now started on aspirin, Plavix, atorvastatin, and metoprolol succinate (triple therapy with Eliquis for only 1 week post cath)  Ventricular tachycardia  - being loaded with IV amiodarone  - beta blocker therapy of metoprolol succinate  Atrial flutter and some concern for afib, paroxysmal   - anticoagulated with Eliquis / [id number] score of 4 (age, sex, CHF, DM)  - EF of 35% per echo 3/2024 /moderate dilation of left atrium noted  - rate control: metoprolol succinate  - antiarrhythmic therapy: amiodarone, see #2  - prior cardioversion: none  - prior ablation: none  Ischemic cardiomyopathy  - EF of 35% per echo 3/23/2024, repeat echo pending today  - now started on GDMT of metoprolol succinate and losartan  - diuretic regimen of IV Lasix currently  Newly diagnosed diabetes mellitus  Hyperlipidemia  - now maintained on atorvastatin 80 mg daily  Tobacco use      Plan:  We discussed just prior to her having cardiac MRI that it is unlikely that we will be able to accommodate MRI at noon and then device to follow afterwards.  Therefore, we will allow her to eat afterwards and plan for ICD implantation tomorrow.  We are hopeful to get her on first case tomorrow but our lab is working on this schedule.    Will give 1 dose of Eliquis today and then hold this in anticipation of device tomorrow and will be fasting after midnight.    She did report that she utilizes a TENS unit along with back stimulator, will look into this and make  "sure these are acceptable with defibrillator.      Subjective/Objective   Subjective: Patient had been fasting today because she was able to receive cardiac MRI and schedule her for ICD implantation.  She was seen just prior to going in for MRI, was concerned about being fasting for the rest the day.    Objective:  Vitals: /72 (BP Location: Left arm)   Pulse 75   Temp 98.1 °F (36.7 °C) (Oral)   Resp 18   Ht 5' 8\" (1.727 m)   Wt 91.1 kg (200 lb 13.4 oz)   SpO2 94%   BMI 30.54 kg/m²     Vitals:    03/25/24 0313 03/26/24 0315   Weight: 92.3 kg (203 lb 7.8 oz) 91.1 kg (200 lb 13.4 oz)     Orthostatic Blood Pressures      Flowsheet Row Most Recent Value   Blood Pressure 108/72 filed at 03/26/2024 1100   Patient Position - Orthostatic VS Lying filed at 03/26/2024 1100              Intake/Output Summary (Last 24 hours) at 3/26/2024 1226  Last data filed at 3/26/2024 1012  Gross per 24 hour   Intake 840.72 ml   Output 2450 ml   Net -1609.28 ml       Invasive Devices       Peripheral Intravenous Line  Duration             Peripheral IV 03/24/24 Dorsal (posterior);Left Forearm 2 days                              Scheduled Meds:  Current Facility-Administered Medications   Medication Dose Route Frequency Provider Last Rate    acetaminophen  650 mg Oral Q4H PRN Tasha Alasid, DO      amiodarone (CORDARONE) 900 mg in dextrose 5 % 500 mL infusion  0.5 mg/min Intravenous Continuous Tasha Connolly, DO 0.5 mg/min (03/26/24 0937)    apixaban  5 mg Oral BID Urmila Garcia PA-C      aspirin  81 mg Oral Daily Tasha Alasid, DO      atorvastatin  80 mg Oral QPM Tasha Connolly, DO      clopidogrel  75 mg Oral Daily Tasha Connolly, DO      gabapentin  100 mg Oral BID Tasha Connolly, DO      Gadobutrol  20 mL Intravenous Once in imaging Jeffy Lama MD      HYDROcodone-acetaminophen  1 tablet Oral BID PRN Dia Swartz, DO      insulin lispro  1-5 Units Subcutaneous HS Tasha Connolly, DO      insulin lispro  1-6 Units Subcutaneous TID " AC Tasha Magid, DO      metoprolol succinate  50 mg Oral Daily Tasha Magid, DO      nicotine  21 mg Transdermal Daily Tasha Magid, DO       Continuous Infusions:amiodarone (CORDARONE) 900 mg in dextrose 5 % 500 mL infusion, 0.5 mg/min, Last Rate: 0.5 mg/min (03/26/24 0937)      PRN Meds:.  acetaminophen    Gadobutrol    HYDROcodone-acetaminophen    Review of Systems:  ROS  ROS as noted above, otherwise 12 point review of systems was performed and is negative.     Physical Exam:   GEN: NAD, alert and oriented, well appearing  SKIN: dry without significant lesions or rashes  HEENT: NCAT, PERRL, EOMs intact  NECK: No JVD appreciated  CARDIOVASCULAR: regular  LUNGS: Good respiratory effort with no audible wheezes  PSYCH: Normal mood and affect  NEURO: CN ll-Xll grossly intact    Physical Exam              Lab Results: I have personally reviewed pertinent lab results.    Results from last 7 days   Lab Units 03/26/24  0332 03/25/24  0451 03/24/24  0604   WBC Thousand/uL 12.28* 12.03* 17.66*   HEMOGLOBIN g/dL 15.9* 14.8 14.3   HEMATOCRIT % 48.1* 44.8 43.9   PLATELETS Thousands/uL 324 312 331     Results from last 7 days   Lab Units 03/26/24  0332 03/25/24  0606 03/24/24  1650   POTASSIUM mmol/L 3.9 3.8 4.1   CHLORIDE mmol/L 97 100 101   CO2 mmol/L 25 25 22   BUN mg/dL 20 23 22   CREATININE mg/dL 0.88 0.87 0.83   CALCIUM mg/dL 9.4 9.1 9.0         Results from last 7 days   Lab Units 03/26/24  0332 03/25/24  0606 03/24/24  1650   MAGNESIUM mg/dL 2.2 2.0 2.0       Imaging: I have personally reviewed pertinent reports.    No results found for this or any previous visit.      VTE Pharmacologic Prophylaxis: Elqiuis  VTE Mechanical Prophylaxis: sequential compression device

## 2024-03-26 NOTE — PROGRESS NOTES
Cardiology Team 2 - Progress Note     Vesna Langston 63 y.o. female MRN: [id number]    Unit/Bed#: [facility] 506-01 Encounter: [id number]          Assessment and Plan      Principal Problem:    ST elevation myocardial infarction involving left anterior descending (LAD) coronary artery ([facility])  Active Problems:    Atrial flutter ([facility])    VT (ventricular tachycardia) ([facility])    Hyperlipemia    New onset type 2 diabetes mellitus ([facility])    Tobacco abuse    Ischemic cardiomyopathy    Chronic low back pain    HFrEF (heart failure with reduced ejection fraction) ([facility])    Assessment / Plan:  STEMI s/p NATHAN of proximal LAD  100% stenosis of proximal LAD s/p PCI intervision with NATHAN 3/22.     ASA 81 while inpatient for tripple therapy, may hold on dc  Atorvastatin 80mg qd  Plavix 75mg qd  Toprol-XL 50mg qd  Dc ARB given low am bp, may initiate on dc    VT--resolved  Afib  A Flutter  Initially in afib rvr  on admission to [facility] before going into VT sp cardioversion. Pt later went into A flutter with 1:1 then 2:1 conduction. Overnight Tele shows pt in aflutter 3/25. Now Amiodarone loaded with stable HR 80s.     Tele  Eliquis 5mg qd  B-Blocker as above  Amiodarone gtt   Cardiac MRI today  Possible ICD placement today post Cardiac MRI  Mg >2.0, K > 4.0, replete prn    New HFrEF (35%)  3 weeks worsening SOB. No orthopnea. S/p lasix. No JVD. Minimal crackles in lungs. Minimal edema bl legs. I/O -1.7L with weight 92.3-> 91.1kg.    Hold Lasix this am, will reassess s/p ICD if requires further diuresis  Monitor for hypotensive episodes with diuresis  Strict I/o; daily standing weights    New Onset DM2  Dm2 diagnosis on admission with A1c 7.9.    SSI algo 2, goal glucose 140-180  Avoid hypoglycemic events while NPO  Discussed mediterranean diet    Tobacco Abuse  1 ppd. Unknown total pack years hx smoking.     Nicotine patches  Discuss cessation with pt   Can consider ouptpt buproprion vs chantax initiation     Chronic Lumbar radiculopathy on outpt  Opioids  Hx of Recent SI joint injection per pt. On outpt Gabapentin 100mg BID, Norco 5-325 confirmed on PDMP 3/25.     Norco per outpt regimen  Tylenol prn mild pain  Gabapentin 100mg BID  Follow up regarding type / specification of Bone Stimulator per [facility] [phone]      Case discussed and reviewed with Dr. Garcia. Please wait for final recommendations from Dr. Garcia.    Thank you for involving us in the care of your patient.      Subjective     Patient seen and examined.  No overnight events. Patient states no current SOB/ CP/ n/v/d. Asking if TENS unit and [facility] stimulator per [facility] could interfere with pt ICD.  Discussed mediterranean diet with pt at length.     Hospital Course:   Vesna Langston is a 63 y.o. year old female with a history of chronic low back pain on outpt opioids, tobacco abuse, and newly diagnosed DM2, hld who presented to [facility] 3/22 in aflutter with RVR s/p adenosine and returned initally to NSR. However, VT developed requiring Cardioversion. ST elevations seen and pt transported to [facility] cath and found to have 100% LAD occulusion. S/p DEX x2. Pt developed Aflutter in Cathlab and started on Amiodarone / Bblocker with return to NSR. Echo at that time showed EF 35%. Pt scheduled to undergo cardiac MRI prior to ICD placement by EP.        Vitals:  Temp:  [97.4 °F (36.3 °C)-98.5 °F (36.9 °C)] 98.1 °F (36.7 °C)  HR:  [75-79] 75  Resp:  [16-19] 18  BP: (108-127)/(59-83) 108/72    Orthostatic Blood Pressures      Flowsheet Row Most Recent Value   Blood Pressure 108/72 filed at 03/26/2024 1100   Patient Position - Orthostatic VS Lying filed at 03/26/2024 1100              Intake and Outputs:  I/O         03/23 0701  03/24 0700 03/24 0701  03/25 0700 03/25 0701  03/26 0700    P.O. 960 600 60    I.V. (mL/kg) 443.1 (4.8) 380.5 (4.1) 45.1 (0.5)    Total Intake(mL/kg) 1403.1 (15.1) 980.5 (10.6) 105.1 (1.1)    Urine (mL/kg/hr) 5335 (2.4) 3450 (1.6) 300 (0.8)    Stool 0 1      Total Output 5335 3451 300    Net -3931.9 -2470.5 -194.9           Unmeasured Stool Occurrence 0 x                Objective     Physical Exam:  General: No apparent distress, resting comfortably   Head: Normocephalic, atraumatic  Eyes: Anicteric, no conjunctival erythema  ENT: External ear normal, no nasal discharge  Neck: Trachea midline, no visible lymphadenopathy or goiter. No JVD  Respiratory: Minimal crackles bl lung bases. Non-labored respirations, symmetric thorax expansion  Cardiovascular: RRR no murmurs. Extremities appear well-perfused  Abdomen: Non-distended  Extremities: Moves extremities spontaneously, trace bl edema  Skin: No visible rashes, wounds, or jaundice  Neuro: A&O x 3, no gross focal deficits, no aphasia          Current Facility-Administered Medications:     acetaminophen (TYLENOL) tablet 650 mg, 650 mg, Oral, Q4H PRN, Tasha Connolly DO, 650 mg at 03/25/24 0742    amiodarone (CORDARONE) 900 mg in dextrose 5 % 500 mL infusion, 0.5 mg/min, Intravenous, Continuous, Tasha Connolly DO, Last Rate: 16.7 mL/hr at 03/26/24 0937, 0.5 mg/min at 03/26/24 0937    apixaban (ELIQUIS) tablet 5 mg, 5 mg, Oral, BID, Urmila Garcia PA-C    aspirin chewable tablet 81 mg, 81 mg, Oral, Daily, Tasha Connolly, DO, 81 mg at 03/26/24 0802    atorvastatin (LIPITOR) tablet 80 mg, 80 mg, Oral, QPM, Tasha Connolly, DO, 80 mg at 03/25/24 1711    [COMPLETED] clopidogrel (PLAVIX) tablet 600 mg, 600 mg, Oral, Once, 600 mg at 03/23/24 2132 **FOLLOWED BY** clopidogrel (PLAVIX) tablet 75 mg, 75 mg, Oral, Daily, Tasha Connolly, DO, 75 mg at 03/26/24 0802    gabapentin (NEURONTIN) capsule 100 mg, 100 mg, Oral, BID, Tasha Connolly, DO, 100 mg at 03/25/24 1711    HYDROcodone-acetaminophen (NORCO) 5-325 mg per tablet 1 tablet, 1 tablet, Oral, BID PRN, Dia Swartz DO, 1 tablet at 03/25/24 1047    insulin lispro (HumALOG/ADMELOG) 100 units/mL subcutaneous injection 1-5 Units, 1-5 Units, Subcutaneous, HS, Tasha Connolly DO, 1 Units at  03/25/24 2125    insulin lispro (HumALOG/ADMELOG) 100 units/mL subcutaneous injection 1-6 Units, 1-6 Units, Subcutaneous, TID AC, 1 Units at 03/25/24 1627 **AND** Fingerstick Glucose (POCT), , , TID AC, Tasha Connolly,     metoprolol succinate (TOPROL-XL) 24 hr tablet 50 mg, 50 mg, Oral, Daily, Tasha Connolly DO, 50 mg at 03/26/24 0802    nicotine (NICODERM CQ) 21 mg/24 hr TD 24 hr patch 21 mg, 21 mg, Transdermal, Daily, Tasha Connolly, DO, 21 mg at 03/24/24 2129    Labs & Results:          Results from last 7 days   Lab Units 03/26/24  0332 03/25/24  0606 03/24/24  1650   POTASSIUM mmol/L 3.9 3.8 4.1   CO2 mmol/L 25 25 22   CHLORIDE mmol/L 97 100 101   BUN mg/dL 20 23 22   CREATININE mg/dL 0.88 0.87 0.83   EGFR ml/min/1.73sq m 69 70 74     Results from last 7 days   Lab Units 03/23/24  0549 03/22/24  1822   AST U/L 52* 55*   ALT U/L 44 56*   ALK PHOS U/L 44 49   TOTAL PROTEIN g/dL 6.7 7.1   ALBUMIN g/dL 4.0 4.4   TOTAL BILIRUBIN mg/dL 0.76 0.64         Results from last 7 days   Lab Units 03/26/24  0332 03/25/24  0451 03/24/24  0604   HEMOGLOBIN g/dL 15.9* 14.8 14.3   HEMATOCRIT % 48.1* 44.8 43.9   PLATELETS Thousands/uL 324 312 331     Results from last 7 days   Lab Units 03/22/24  2156 03/22/24  1822   TRIGLYCERIDES mg/dL  --  185*   HDL mg/dL  --  50   LDL CALC mg/dL  --  236*   HEMOGLOBIN A1C % 7.9*  --            Cardiac Imaging/Monitoring       Telemetry:   Personally reviewed by Dia Swartz DO: Aflutter 1:1 to 2:1 overnight. Possible 7 beet run NSVT     Imaging: CXR 3/22/24--Pulm Edema      EKG:  Date:  Interpretation:    Encounter Date: 03/22/24   ECG 12 lead   Result Value    Ventricular Rate 123    Atrial Rate 293    ID Interval     QRSD Interval 84    QT Interval 334    QTC Interval 478    P Axis     QRS Axis 77    T Wave Axis 70    Narrative    Atrial flutter with variable A-V block  Cannot rule out Low voltage QRS  Septal infarct (cited on or before 22-MAR-2024)  Abnormal ECG  When compared with ECG  of 22-MAR-2024 21:37,  Atrial flutter has replaced Sinus rhythm  Confirmed by Raudel Pickens ([id number]) on 3/24/2024 3:59:40 PM         Echo: No results found for this or any previous visit.        VTE Prophylaxis: Sequential compression device (Venodyne)  Dominick Swartz, DO  Internal Medicine Residency PGY-1  [facility], [place]      ** Please Note: Voice dictation software may have been used in the creation of this document. **

## 2024-03-26 NOTE — QUICK NOTE
Called Dr. Garcia at [facility] [phone] regarding pt stated hx of bone spine stimulator.    Per [facility] state pt   L3-L4 fusion posterior with rods/screws. All stimulators used externally only. Per Dr. Garcia, would recommend stopping use of bone stimulator at night post ICD.    Dia Swartz DO

## 2024-03-27 ENCOUNTER — ANESTHESIA EVENT (INPATIENT)
Dept: NON INVASIVE DIAGNOSTICS | Facility: HOSPITAL | Age: 64
DRG: 222 | End: 2024-03-27
Payer: COMMERCIAL

## 2024-03-27 ENCOUNTER — APPOINTMENT (INPATIENT)
Dept: RADIOLOGY | Facility: HOSPITAL | Age: 64
DRG: 222 | End: 2024-03-27
Payer: COMMERCIAL

## 2024-03-27 PROBLEM — Z95.810 S/P ICD (INTERNAL CARDIAC DEFIBRILLATOR) PROCEDURE: Status: ACTIVE | Noted: 2024-03-27

## 2024-03-27 LAB
ANION GAP SERPL CALCULATED.3IONS-SCNC: 13 MMOL/L (ref 4–13)
BUN SERPL-MCNC: 17 MG/DL (ref 5–25)
CALCIUM SERPL-MCNC: 9.1 MG/DL (ref 8.4–10.2)
CHLORIDE SERPL-SCNC: 98 MMOL/L (ref 96–108)
CO2 SERPL-SCNC: 22 MMOL/L (ref 21–32)
CREAT SERPL-MCNC: 0.83 MG/DL (ref 0.6–1.3)
ERYTHROCYTE [DISTWIDTH] IN BLOOD BY AUTOMATED COUNT: 13.7 % (ref 11.6–15.1)
GFR SERPL CREATININE-BSD FRML MDRD: 74 ML/MIN/1.73SQ M
GLUCOSE SERPL-MCNC: 153 MG/DL (ref 65–140)
GLUCOSE SERPL-MCNC: 163 MG/DL (ref 65–140)
GLUCOSE SERPL-MCNC: 170 MG/DL (ref 65–140)
GLUCOSE SERPL-MCNC: 172 MG/DL (ref 65–140)
GLUCOSE SERPL-MCNC: 191 MG/DL (ref 65–140)
HCT VFR BLD AUTO: 44.8 % (ref 34.8–46.1)
HGB BLD-MCNC: 15.1 G/DL (ref 11.5–15.4)
MCH RBC QN AUTO: 30.6 PG (ref 26.8–34.3)
MCHC RBC AUTO-ENTMCNC: 33.7 G/DL (ref 31.4–37.4)
MCV RBC AUTO: 91 FL (ref 82–98)
PLATELET # BLD AUTO: 296 THOUSANDS/UL (ref 149–390)
PMV BLD AUTO: 10.2 FL (ref 8.9–12.7)
POTASSIUM SERPL-SCNC: 4.1 MMOL/L (ref 3.5–5.3)
RBC # BLD AUTO: 4.94 MILLION/UL (ref 3.81–5.12)
SODIUM SERPL-SCNC: 133 MMOL/L (ref 135–147)
WBC # BLD AUTO: 12.05 THOUSAND/UL (ref 4.31–10.16)

## 2024-03-27 PROCEDURE — 82948 REAGENT STRIP/BLOOD GLUCOSE: CPT

## 2024-03-27 PROCEDURE — C1721 AICD, DUAL CHAMBER: HCPCS | Performed by: INTERNAL MEDICINE

## 2024-03-27 PROCEDURE — 93005 ELECTROCARDIOGRAM TRACING: CPT

## 2024-03-27 PROCEDURE — 71045 X-RAY EXAM CHEST 1 VIEW: CPT

## 2024-03-27 PROCEDURE — C1777 LEAD, AICD, ENDO SINGLE COIL: HCPCS | Performed by: INTERNAL MEDICINE

## 2024-03-27 PROCEDURE — 3E0102A INTRODUCTION OF ANTI-INFECTIVE ENVELOPE INTO SUBCUTANEOUS TISSUE, OPEN APPROACH: ICD-10-PCS | Performed by: INTERNAL MEDICINE

## 2024-03-27 PROCEDURE — 0JH608Z INSERTION OF DEFIBRILLATOR GENERATOR INTO CHEST SUBCUTANEOUS TISSUE AND FASCIA, OPEN APPROACH: ICD-10-PCS | Performed by: INTERNAL MEDICINE

## 2024-03-27 PROCEDURE — 33249 INSJ/RPLCMT DEFIB W/LEAD(S): CPT | Performed by: INTERNAL MEDICINE

## 2024-03-27 PROCEDURE — 99024 POSTOP FOLLOW-UP VISIT: CPT | Performed by: INTERNAL MEDICINE

## 2024-03-27 PROCEDURE — 85027 COMPLETE CBC AUTOMATED: CPT | Performed by: PHYSICIAN ASSISTANT

## 2024-03-27 PROCEDURE — 02HK3KZ INSERTION OF DEFIBRILLATOR LEAD INTO RIGHT VENTRICLE, PERCUTANEOUS APPROACH: ICD-10-PCS | Performed by: INTERNAL MEDICINE

## 2024-03-27 PROCEDURE — 02H63KZ INSERTION OF DEFIBRILLATOR LEAD INTO RIGHT ATRIUM, PERCUTANEOUS APPROACH: ICD-10-PCS | Performed by: INTERNAL MEDICINE

## 2024-03-27 PROCEDURE — C1892 INTRO/SHEATH,FIXED,PEEL-AWAY: HCPCS | Performed by: INTERNAL MEDICINE

## 2024-03-27 PROCEDURE — 80048 BASIC METABOLIC PNL TOTAL CA: CPT | Performed by: PHYSICIAN ASSISTANT

## 2024-03-27 PROCEDURE — C1898 LEAD, PMKR, OTHER THAN TRANS: HCPCS | Performed by: INTERNAL MEDICINE

## 2024-03-27 DEVICE — LEAD 6935M62 QUATTRO SECURE S MRI US
Type: IMPLANTABLE DEVICE | Site: HEART | Status: FUNCTIONAL
Brand: SPRINT QUATTRO SECURE S MRI™ SURESCAN™

## 2024-03-27 DEVICE — ENVELOPE CMRM6133 ABSORB LRG MR
Type: IMPLANTABLE DEVICE | Site: CHEST | Status: FUNCTIONAL
Brand: TYRX™

## 2024-03-27 DEVICE — ICD DDPA2D4 COBALT XT DR MRI DF4 USA
Type: IMPLANTABLE DEVICE | Site: HEART | Status: FUNCTIONAL
Brand: COBALT™ XT DR MRI SURESCAN™

## 2024-03-27 DEVICE — LEAD 457453 MRI US BI RCMCRD MVC
Type: IMPLANTABLE DEVICE | Site: HEART | Status: FUNCTIONAL
Brand: CAPSURE SENSE MRI™ SURESCAN™

## 2024-03-27 RX ORDER — LIDOCAINE HYDROCHLORIDE 10 MG/ML
INJECTION, SOLUTION EPIDURAL; INFILTRATION; INTRACAUDAL; PERINEURAL CODE/TRAUMA/SEDATION MEDICATION
Status: DISCONTINUED | OUTPATIENT
Start: 2024-03-27 | End: 2024-03-27 | Stop reason: HOSPADM

## 2024-03-27 RX ORDER — FENTANYL CITRATE 50 UG/ML
INJECTION, SOLUTION INTRAMUSCULAR; INTRAVENOUS AS NEEDED
Status: DISCONTINUED | OUTPATIENT
Start: 2024-03-27 | End: 2024-03-27

## 2024-03-27 RX ORDER — PROPOFOL 10 MG/ML
INJECTION, EMULSION INTRAVENOUS CONTINUOUS PRN
Status: DISCONTINUED | OUTPATIENT
Start: 2024-03-27 | End: 2024-03-27

## 2024-03-27 RX ORDER — CEFAZOLIN SODIUM 2 G/50ML
SOLUTION INTRAVENOUS AS NEEDED
Status: DISCONTINUED | OUTPATIENT
Start: 2024-03-27 | End: 2024-03-27

## 2024-03-27 RX ORDER — ONDANSETRON 2 MG/ML
INJECTION INTRAMUSCULAR; INTRAVENOUS AS NEEDED
Status: DISCONTINUED | OUTPATIENT
Start: 2024-03-27 | End: 2024-03-27

## 2024-03-27 RX ORDER — SODIUM CHLORIDE 9 MG/ML
INJECTION, SOLUTION INTRAVENOUS CONTINUOUS PRN
Status: DISCONTINUED | OUTPATIENT
Start: 2024-03-27 | End: 2024-03-27

## 2024-03-27 RX ORDER — INSULIN GLARGINE 100 [IU]/ML
7 INJECTION, SOLUTION SUBCUTANEOUS
Status: DISCONTINUED | OUTPATIENT
Start: 2024-03-27 | End: 2024-03-28 | Stop reason: HOSPADM

## 2024-03-27 RX ORDER — MIDAZOLAM HYDROCHLORIDE 2 MG/2ML
INJECTION, SOLUTION INTRAMUSCULAR; INTRAVENOUS AS NEEDED
Status: DISCONTINUED | OUTPATIENT
Start: 2024-03-27 | End: 2024-03-27

## 2024-03-27 RX ORDER — PROPOFOL 10 MG/ML
INJECTION, EMULSION INTRAVENOUS AS NEEDED
Status: DISCONTINUED | OUTPATIENT
Start: 2024-03-27 | End: 2024-03-27

## 2024-03-27 RX ADMIN — METOPROLOL SUCCINATE 50 MG: 50 TABLET, EXTENDED RELEASE ORAL at 11:32

## 2024-03-27 RX ADMIN — INSULIN LISPRO 1 UNITS: 100 INJECTION, SOLUTION INTRAVENOUS; SUBCUTANEOUS at 11:35

## 2024-03-27 RX ADMIN — SACUBITRIL AND VALSARTAN 1 TABLET: 24; 26 TABLET, FILM COATED ORAL at 17:17

## 2024-03-27 RX ADMIN — HYDROCODONE BITARTRATE AND ACETAMINOPHEN 1 TABLET: 5; 325 TABLET ORAL at 04:44

## 2024-03-27 RX ADMIN — INSULIN LISPRO 1 UNITS: 100 INJECTION, SOLUTION INTRAVENOUS; SUBCUTANEOUS at 21:10

## 2024-03-27 RX ADMIN — INSULIN LISPRO 1 UNITS: 100 INJECTION, SOLUTION INTRAVENOUS; SUBCUTANEOUS at 16:45

## 2024-03-27 RX ADMIN — AMIODARONE HYDROCHLORIDE 200 MG: 200 TABLET ORAL at 11:29

## 2024-03-27 RX ADMIN — ONDANSETRON 4 MG: 2 INJECTION INTRAMUSCULAR; INTRAVENOUS at 10:17

## 2024-03-27 RX ADMIN — GABAPENTIN 100 MG: 100 CAPSULE ORAL at 17:17

## 2024-03-27 RX ADMIN — AMIODARONE HYDROCHLORIDE 200 MG: 200 TABLET ORAL at 16:43

## 2024-03-27 RX ADMIN — INSULIN LISPRO 1 UNITS: 100 INJECTION, SOLUTION INTRAVENOUS; SUBCUTANEOUS at 06:32

## 2024-03-27 RX ADMIN — AMIODARONE HYDROCHLORIDE 200 MG: 200 TABLET ORAL at 06:33

## 2024-03-27 RX ADMIN — CLOPIDOGREL BISULFATE 75 MG: 75 TABLET ORAL at 11:31

## 2024-03-27 RX ADMIN — MIDAZOLAM 1 MG: 1 INJECTION INTRAMUSCULAR; INTRAVENOUS at 08:47

## 2024-03-27 RX ADMIN — ASPIRIN 81 MG CHEWABLE TABLET 81 MG: 81 TABLET CHEWABLE at 11:33

## 2024-03-27 RX ADMIN — HYDROCODONE BITARTRATE AND ACETAMINOPHEN 1 TABLET: 5; 325 TABLET ORAL at 16:42

## 2024-03-27 RX ADMIN — APIXABAN 5 MG: 5 TABLET, FILM COATED ORAL at 13:53

## 2024-03-27 RX ADMIN — GABAPENTIN 100 MG: 100 CAPSULE ORAL at 11:29

## 2024-03-27 RX ADMIN — ACETAMINOPHEN 650 MG: 325 TABLET, FILM COATED ORAL at 12:52

## 2024-03-27 RX ADMIN — PROPOFOL 40 MCG/KG/MIN: 10 INJECTION, EMULSION INTRAVENOUS at 08:54

## 2024-03-27 RX ADMIN — ACETAMINOPHEN 650 MG: 325 TABLET, FILM COATED ORAL at 20:04

## 2024-03-27 RX ADMIN — ATORVASTATIN CALCIUM 80 MG: 80 TABLET, FILM COATED ORAL at 17:17

## 2024-03-27 RX ADMIN — MIDAZOLAM 1 MG: 1 INJECTION INTRAMUSCULAR; INTRAVENOUS at 08:52

## 2024-03-27 RX ADMIN — FENTANYL CITRATE 25 MCG: 50 INJECTION INTRAMUSCULAR; INTRAVENOUS at 09:05

## 2024-03-27 RX ADMIN — CEFAZOLIN SODIUM 2000 MG: 2 SOLUTION INTRAVENOUS at 08:50

## 2024-03-27 RX ADMIN — SODIUM CHLORIDE: 9 INJECTION, SOLUTION INTRAVENOUS at 08:39

## 2024-03-27 RX ADMIN — NOREPINEPHRINE BITARTRATE 2 MCG/MIN: 1 INJECTION, SOLUTION, CONCENTRATE INTRAVENOUS at 08:55

## 2024-03-27 RX ADMIN — INSULIN GLARGINE 7 UNITS: 100 INJECTION, SOLUTION SUBCUTANEOUS at 21:09

## 2024-03-27 RX ADMIN — PROPOFOL 30 MG: 10 INJECTION, EMULSION INTRAVENOUS at 08:53

## 2024-03-27 RX ADMIN — FENTANYL CITRATE 25 MCG: 50 INJECTION INTRAMUSCULAR; INTRAVENOUS at 08:46

## 2024-03-27 NOTE — ANESTHESIA PREPROCEDURE EVALUATION
Procedure:  Cardiac icd implant (Chest)    Relevant Problems   CARDIO   (+) Atrial flutter (HCC)   (+) Hyperlipemia   (+) ST elevation myocardial infarction involving left anterior descending (LAD) coronary artery (HCC)      ENDO   (+) New onset type 2 diabetes mellitus (HCC)      MUSCULOSKELETAL   (+) Chronic low back pain      NEURO/PSYCH   (+) Chronic low back pain        Physical Exam    Airway    Mallampati score: II  TM Distance: >3 FB  Neck ROM: full     Dental    lower dentures and upper dentures    Cardiovascular  Rhythm: regular, Rate: normal    Pulmonary   Breath sounds clear to auscultation, No rhonchi, Decreased breath sounds, No wheezes    Other Findings  post-pubertal.      Anesthesia Plan  ASA Score- 3     Anesthesia Type- IV sedation with anesthesia with ASA Monitors.         Additional Monitors:     Airway Plan: NTT.           Plan Factors-Exercise tolerance (METS): >4 METS.    Chart reviewed. EKG reviewed. Imaging results reviewed. Existing labs reviewed. Patient summary reviewed.    Patient is not a current smoker.  Patient did not smoke on day of surgery.    Obstructive sleep apnea risk education given perioperatively.        Induction- intravenous.    Postoperative Plan-     Informed Consent- Anesthetic plan and risks discussed with patient.  I personally reviewed this patient with the CRNA. Discussed and agreed on the Anesthesia Plan with the CRNA..

## 2024-03-27 NOTE — QUICK NOTE
Saw patient postop after dual-chamber ICD implantation.  Doing well does have some soreness that requires Tylenol and oxycodone.  Went over postop restrictions.    She does have a modified pressure bandage on as she does need to be on aspirin, Plavix, and Eliquis for the first week after STEMI/PCI.  Will reassess if pressure bandage is to be left on another day tomorrow.    Advised to keep sling on and will repeat PA and lateral tomorrow and go over postop restrictions for her again.    She has been transition to oral amiodarone and will remain on 200 mg twice daily for 1 more week at which point she can be lowered to 200 mg daily.    ADDENDUM: amio should be three times daily.

## 2024-03-27 NOTE — ANESTHESIA POSTPROCEDURE EVALUATION
Post-Op Assessment Note    CV Status:  Stable  Pain Score: 0    Pain management: adequate    Multimodal analgesia used between 6 hours prior to anesthesia start to PACU discharge    Mental Status:  Alert   Hydration Status:  Stable   PONV Controlled:  Controlled   Airway Patency:  Patent  Two or more mitigation strategies used for obstructive sleep apnea   Post Op Vitals Reviewed: Yes    No anethesia notable event occurred.    Staff: CRNA               BP   110/75   Temp 37   Pulse 78   Resp 15   SpO2 100

## 2024-03-27 NOTE — PROGRESS NOTES
Cardiology Team 2 - Progress Note     Vesna Langston 63 y.o. female MRN: [id number]    Unit/Bed#: [facility] 506-01 Encounter: [id number]          Assessment and Plan      Principal Problem:    ST elevation myocardial infarction involving left anterior descending (LAD) coronary artery ([facility])  Active Problems:    Atrial flutter (HCC)    VT (ventricular tachycardia) ([facility])    Hyperlipemia    New onset type 2 diabetes mellitus (HCC)    Tobacco abuse    Ischemic cardiomyopathy    Chronic low back pain    HFrEF (heart failure with reduced ejection fraction) ([facility])    S/P ICD (internal cardiac defibrillator) procedure    Assessment / Plan:  STEMI s/p NATHAN of proximal LAD  100% stenosis of proximal LAD s/p PCI intervision with NATHAN 3/22.     ASA 81 while inpatient for tripple therapy, may hold on dc  Atorvastatin 80mg qd  Plavix 75mg qd  Toprol-XL 50mg qd  Dc ARB given low am bp, may initiate on dc    VT--resolved  Afib  A Flutter  S/p ICD 3/27  Initially in afib rvr  on admission to [facility] before going into VT sp cardioversion. Pt later went into A flutter with 1:1 then 2:1 conduction. Overnight Tele shows pt in aflutter 3/25. Now Amiodarone loaded with stable HR 80s. 3/26 Cardiac MRI--official read pending. S/p ICD 3/27.    Tele  Eliquis 5mg qd  B-Blocker as above  Amiodarone 200mg TID  F/u Cardiac MRI official read  Possible ICD placement today post Cardiac MRI  Mg >2.0, K > 4.0, replete prn    New HFrEF (35%)  3 weeks worsening SOB. No orthopnea. S/p lasix. No JVD. Minimal crackles in lungs. Minimal edema bl legs. I/O -1.7L with weight 92.3-> 91.1kg.    Hold Lasix this am given ICD, will re-assess if require in am / on dc  Strict I/o; daily standing weights    New Onset DM2  Dm2 diagnosis on admission with A1c 7.9.     SSI algo 2, goal glucose 140-180  Avoid hypoglycemic events while NPO  Provided requested mediterranean diet handouts  Will need outpt PCP follow-up    Tobacco Abuse  1 ppd. Unknown total pack years hx smoking.      Nicotine patches  Discuss cessation with pt   Can consider ouptpt buproprion vs chantax initiation     Chronic Lumbar radiculopathy on outpt Opioids  Hx of Recent SI joint injection per pt. On outpt Gabapentin 100mg BID, Norco 5-325 confirmed on PDMP 3/25.     Norco per outpt regimen  Tylenol prn mild pain  Gabapentin 100mg BID  Pt to dc bone spine stimulator / and reduce tens unit frequency s/p ICD. Pt aware.    Case discussed and reviewed with Dr. Garcia. Please wait for final recommendations from Dr. Garcia.    Thank you for involving us in the care of your patient.      Subjective     Patient seen and examined.  No overnight events. Pt seen s/p ICD placement this am. Daughter and son in room. Patient states no current SOB/ CP/ n/v/d.  States some discomfort at surgical site / left arm. Pt discussion about dm/mediterranean diet and likely medications needed on dc.    Hospital Course:   Vesna Langston is a 63 y.o. year old female with a history of chronic low back pain on outpt opioids, tobacco abuse, and newly diagnosed DM2, hld who presented to [facility] 3/22 in aflutter with RVR s/p adenosine and returned initally to NSR. However, VT developed requiring Cardioversion. ST elevations seen and pt transported to B cath and found to have 100% LAD occulusion. S/p DEX x2. Pt developed Aflutter in Cathlab and started on Amiodarone / Bblocker with return to NSR. Echo at that time showed EF 35%. Pt underwent Cardiac MRI prior to ICD placement by EP 3/27.        Vitals:  Temp:  [98.2 °F (36.8 °C)-98.6 °F (37 °C)] 98.6 °F (37 °C)  HR:  [80-86] 84  Resp:  [16-22] 22  BP: ()/(57-71) 116/71    Orthostatic Blood Pressures      Flowsheet Row Most Recent Value   Blood Pressure 116/71 filed at 03/27/2024 1129   Patient Position - Orthostatic VS Lying filed at 03/27/2024 1051              Intake and Outputs:  I/O         03/23 0701  03/24 0700 03/24 0701  03/25 0700 03/25 0701  03/26 0700    P.O. 960 600 60    I.V. (mL/kg) 443.1  (4.8) 380.5 (4.1) 45.1 (0.5)    Total Intake(mL/kg) 1403.1 (15.1) 980.5 (10.6) 105.1 (1.1)    Urine (mL/kg/hr) 5335 (2.4) 3450 (1.6) 300 (0.8)    Stool 0 1     Total Output 5335 3451 300    Net -3931.9 -2470.5 -194.9           Unmeasured Stool Occurrence 0 x                Objective     Physical Exam:  General: No apparent distress, resting comfortably   Head: Normocephalic, atraumatic  Eyes: Anicteric, no conjunctival erythema  ENT: External ear normal, no nasal discharge  Neck: Trachea midline, no visible lymphadenopathy or goiter. No JVD  Respiratory: CTAB. Non-labored respirations, symmetric thorax expansion  Cardiovascular: RRR no murmurs. Extremities appear well-perfused  Abdomen: Non-distended  Extremities: Moves extremities spontaneously, trace bl edema. Left arm in sling  Skin: No visible rashes, wounds, or jaundice  Neuro: A&O x 3, no gross focal deficits, no aphasia          Current Facility-Administered Medications:     acetaminophen (TYLENOL) tablet 650 mg, 650 mg, Oral, Q4H PRN, Urmila Soden, PA-C, 650 mg at 03/26/24 2147    amiodarone tablet 200 mg, 200 mg, Oral, TID With Meals, Urmila Soden, PA-C, 200 mg at 03/27/24 1129    aspirin chewable tablet 81 mg, 81 mg, Oral, Daily, Urmila Soden, PA-C, 81 mg at 03/27/24 1133    atorvastatin (LIPITOR) tablet 80 mg, 80 mg, Oral, QPM, Urmila Soden, PA-C, 80 mg at 03/26/24 1651    [COMPLETED] clopidogrel (PLAVIX) tablet 600 mg, 600 mg, Oral, Once, 600 mg at 03/23/24 2132 **FOLLOWED BY** clopidogrel (PLAVIX) tablet 75 mg, 75 mg, Oral, Daily, Urmila Soden, PA-C, 75 mg at 03/27/24 1131    gabapentin (NEURONTIN) capsule 100 mg, 100 mg, Oral, BID, Urmila Soden, PA-C, 100 mg at 03/27/24 1129    HYDROcodone-acetaminophen (NORCO) 5-325 mg per tablet 1 tablet, 1 tablet, Oral, BID PRN, Urmila Sodjanis, PA-C, 1 tablet at 03/27/24 0444    insulin lispro (HumALOG/ADMELOG) 100 units/mL subcutaneous injection 1-5 Units, 1-5 Units, Subcutaneous, HS, Urmila Garcia, PA-C, 1 Units at 03/26/24  2116    insulin lispro (HumALOG/ADMELOG) 100 units/mL subcutaneous injection 1-6 Units, 1-6 Units, Subcutaneous, TID AC, 1 Units at 03/27/24 1135 **AND** Fingerstick Glucose (POCT), , , TID AC, Urmila Soden, PA-C    metoprolol succinate (TOPROL-XL) 24 hr tablet 50 mg, 50 mg, Oral, Daily, Urmila Soden, PA-C, 50 mg at 03/27/24 1132    nicotine (NICODERM CQ) 21 mg/24 hr TD 24 hr patch 21 mg, 21 mg, Transdermal, Daily, Urmila Soden, PA-C, 21 mg at 03/24/24 2129    Labs & Results:          Results from last 7 days   Lab Units 03/27/24  0440 03/26/24  0332 03/25/24  0606   POTASSIUM mmol/L 4.1 3.9 3.8   CO2 mmol/L 22 25 25   CHLORIDE mmol/L 98 97 100   BUN mg/dL 17 20 23   CREATININE mg/dL 0.83 0.88 0.87   EGFR ml/min/1.73sq m 74 69 70     Results from last 7 days   Lab Units 03/23/24  0549 03/22/24  1822   AST U/L 52* 55*   ALT U/L 44 56*   ALK PHOS U/L 44 49   TOTAL PROTEIN g/dL 6.7 7.1   ALBUMIN g/dL 4.0 4.4   TOTAL BILIRUBIN mg/dL 0.76 0.64         Results from last 7 days   Lab Units 03/27/24  0440 03/26/24  0332 03/25/24  0451   HEMOGLOBIN g/dL 15.1 15.9* 14.8   HEMATOCRIT % 44.8 48.1* 44.8   PLATELETS Thousands/uL 296 324 312     Results from last 7 days   Lab Units 03/22/24  2156 03/22/24  1822   TRIGLYCERIDES mg/dL  --  185*   HDL mg/dL  --  50   LDL CALC mg/dL  --  236*   HEMOGLOBIN A1C % 7.9*  --            Cardiac Imaging/Monitoring       Telemetry:   Personally reviewed by Dia Swartz, DO: Aflutter 1:1 to 2:1 overnight. Possible 7 beet run NSVT     Imaging: CXR 3/22/24--Pulm Edema      EKG:  Date:  Interpretation:    Encounter Date: 03/22/24   ECG 12 lead   Result Value    Ventricular Rate 82    Atrial Rate 82    GA Interval 156    QRSD Interval 82    QT Interval 438    QTC Interval 511    P Axis 66    QRS Axis 69    T Wave Axis 84    Narrative    Sinus rhythm with Premature atrial complexes  Left atrial enlargement  Poor R wave progression  Prolonged QT  Abnormal ECG  When compared with ECG of 23-MAR-2024  22:31,  Sinus rhythm has replaced Atrial flutter  Vent. rate has decreased BY  41 BPM  Confirmed by Rubén Garcia ([id number]) on 3/26/2024 3:44:43 PM         Echo: No results found for this or any previous visit.        VTE Prophylaxis: Sequential compression device (Venodyne)  Dominick Swartz DO  Internal Medicine Residency PGY-1  [facility], [place]      ** Please Note: Voice dictation software may have been used in the creation of this document. **

## 2024-03-28 ENCOUNTER — APPOINTMENT (INPATIENT)
Dept: RADIOLOGY | Facility: HOSPITAL | Age: 64
DRG: 222 | End: 2024-03-28
Payer: COMMERCIAL

## 2024-03-28 VITALS
HEART RATE: 74 BPM | RESPIRATION RATE: 18 BRPM | WEIGHT: 200.4 LBS | OXYGEN SATURATION: 97 % | BODY MASS INDEX: 30.37 KG/M2 | SYSTOLIC BLOOD PRESSURE: 101 MMHG | TEMPERATURE: 98.3 F | HEIGHT: 68 IN | DIASTOLIC BLOOD PRESSURE: 62 MMHG

## 2024-03-28 PROBLEM — I47.20 VT (VENTRICULAR TACHYCARDIA) (HCC): Status: RESOLVED | Noted: 2024-03-22 | Resolved: 2024-03-28

## 2024-03-28 PROBLEM — I48.92 ATRIAL FLUTTER (HCC): Status: RESOLVED | Noted: 2024-03-22 | Resolved: 2024-03-28

## 2024-03-28 LAB
ATRIAL RATE: 79 BPM
GLUCOSE SERPL-MCNC: 145 MG/DL (ref 65–140)
GLUCOSE SERPL-MCNC: 155 MG/DL (ref 65–140)
P AXIS: 64 DEGREES
PR INTERVAL: 160 MS
QRS AXIS: 52 DEGREES
QRSD INTERVAL: 88 MS
QT INTERVAL: 434 MS
QTC INTERVAL: 497 MS
T WAVE AXIS: 79 DEGREES
VENTRICULAR RATE: 79 BPM

## 2024-03-28 PROCEDURE — 93010 ELECTROCARDIOGRAM REPORT: CPT | Performed by: INTERNAL MEDICINE

## 2024-03-28 PROCEDURE — 99024 POSTOP FOLLOW-UP VISIT: CPT | Performed by: INTERNAL MEDICINE

## 2024-03-28 PROCEDURE — 71046 X-RAY EXAM CHEST 2 VIEWS: CPT

## 2024-03-28 PROCEDURE — 82948 REAGENT STRIP/BLOOD GLUCOSE: CPT

## 2024-03-28 RX ORDER — NICOTINE 21 MG/24HR
1 PATCH, TRANSDERMAL 24 HOURS TRANSDERMAL DAILY
Qty: 70 PATCH | Refills: 0 | Status: SHIPPED | OUTPATIENT
Start: 2024-03-28 | End: 2024-06-06

## 2024-03-28 RX ORDER — AMIODARONE HYDROCHLORIDE 200 MG/1
TABLET ORAL 3 TIMES DAILY
Qty: 111 TABLET | Refills: 0 | Status: SHIPPED | OUTPATIENT
Start: 2024-03-28 | End: 2024-07-02

## 2024-03-28 RX ORDER — ASPIRIN 81 MG/1
81 TABLET, CHEWABLE ORAL DAILY
Qty: 30 TABLET | Refills: 2 | Status: CANCELLED | OUTPATIENT
Start: 2024-03-29 | End: 2024-06-27

## 2024-03-28 RX ORDER — GABAPENTIN 100 MG/1
100 CAPSULE ORAL 2 TIMES DAILY
Start: 2024-03-28

## 2024-03-28 RX ORDER — CLOPIDOGREL BISULFATE 75 MG/1
75 TABLET ORAL DAILY
Qty: 30 TABLET | Refills: 2 | Status: SHIPPED | OUTPATIENT
Start: 2024-03-29 | End: 2024-06-27

## 2024-03-28 RX ORDER — ASPIRIN 81 MG/1
81 TABLET, CHEWABLE ORAL DAILY
Qty: 30 TABLET | Refills: 5 | Status: SHIPPED | OUTPATIENT
Start: 2024-03-28 | End: 2024-09-24

## 2024-03-28 RX ORDER — METOPROLOL SUCCINATE 25 MG/1
25 TABLET, EXTENDED RELEASE ORAL DAILY
Qty: 30 TABLET | Refills: 3 | Status: SHIPPED | OUTPATIENT
Start: 2024-03-28

## 2024-03-28 RX ORDER — ATORVASTATIN CALCIUM 80 MG/1
80 TABLET, FILM COATED ORAL EVERY EVENING
Qty: 30 TABLET | Refills: 2 | Status: SHIPPED | OUTPATIENT
Start: 2024-03-28 | End: 2024-06-26

## 2024-03-28 RX ORDER — AMIODARONE HYDROCHLORIDE 200 MG/1
TABLET ORAL 2 TIMES DAILY
Qty: 104 TABLET | Refills: 0 | Status: SHIPPED | OUTPATIENT
Start: 2024-03-29 | End: 2024-03-28

## 2024-03-28 RX ORDER — METOPROLOL SUCCINATE 50 MG/1
25 TABLET, EXTENDED RELEASE ORAL DAILY
Qty: 15 TABLET | Refills: 2 | Status: SHIPPED | OUTPATIENT
Start: 2024-03-29 | End: 2024-03-28

## 2024-03-28 RX ORDER — ASPIRIN 81 MG/1
81 TABLET, CHEWABLE ORAL DAILY
Qty: 30 TABLET | Refills: 2 | Status: SHIPPED | OUTPATIENT
Start: 2024-03-28 | End: 2024-03-28

## 2024-03-28 RX ORDER — FUROSEMIDE 20 MG/1
20 TABLET ORAL 2 TIMES DAILY
Qty: 60 TABLET | Refills: 2 | Status: SHIPPED | OUTPATIENT
Start: 2024-03-28 | End: 2024-06-26

## 2024-03-28 RX ORDER — SACUBITRIL AND VALSARTAN 24; 26 MG/1; MG/1
1 TABLET, FILM COATED ORAL 2 TIMES DAILY
Qty: 60 TABLET | Refills: 3 | Status: SHIPPED | OUTPATIENT
Start: 2024-03-28

## 2024-03-28 RX ADMIN — AMIODARONE HYDROCHLORIDE 200 MG: 200 TABLET ORAL at 11:22

## 2024-03-28 RX ADMIN — AMIODARONE HYDROCHLORIDE 200 MG: 200 TABLET ORAL at 16:43

## 2024-03-28 RX ADMIN — AMIODARONE HYDROCHLORIDE 200 MG: 200 TABLET ORAL at 07:26

## 2024-03-28 RX ADMIN — APIXABAN 5 MG: 5 TABLET, FILM COATED ORAL at 08:24

## 2024-03-28 RX ADMIN — METOPROLOL SUCCINATE 50 MG: 50 TABLET, EXTENDED RELEASE ORAL at 09:48

## 2024-03-28 RX ADMIN — ACETAMINOPHEN 650 MG: 325 TABLET, FILM COATED ORAL at 13:36

## 2024-03-28 RX ADMIN — CLOPIDOGREL BISULFATE 75 MG: 75 TABLET ORAL at 08:24

## 2024-03-28 RX ADMIN — HYDROCODONE BITARTRATE AND ACETAMINOPHEN 1 TABLET: 5; 325 TABLET ORAL at 02:07

## 2024-03-28 RX ADMIN — GABAPENTIN 100 MG: 100 CAPSULE ORAL at 08:24

## 2024-03-28 RX ADMIN — INSULIN LISPRO 1 UNITS: 100 INJECTION, SOLUTION INTRAVENOUS; SUBCUTANEOUS at 07:28

## 2024-03-28 RX ADMIN — ACETAMINOPHEN 650 MG: 325 TABLET, FILM COATED ORAL at 06:32

## 2024-03-28 RX ADMIN — ASPIRIN 81 MG CHEWABLE TABLET 81 MG: 81 TABLET CHEWABLE at 08:24

## 2024-03-28 NOTE — DISCHARGE SUMMARY
"  Discharge Summary - Vesna Langston 63 y.o. female MRN: [id number]    Unit/Bed#: [facility] 506-01 Encounter: [id number]    Admission Date: 3/22/24      Admitting Diagnosis: STEMI (ST elevation myocardial infarction) (HCC) [I21.3]    HPI:  Vesna Langston is a 63 y.o. year old female with a history of chronic low back pain on outpt opioids, tobacco abuse who presented to [facility] 3/22/24 via EMS for episode of syncope / chest pressure. Patient stated that she had received a recent SI joint injection today and after going home she was cleaning and fell to the floor without prodrome. Pt states she felt her   holding her hand and was then able to crawl to phone to call for her son. Pt was then transported to the ED via EMS.\" Pt found to be in aflutter with rvr in ED s/p adenosine with initial return to NSR. However, VT developed and required shock x1. ST elevation seen on EKG and pt life flight to [facility] cath lab. a-flutter and went into VT s/p shock EKG after showed lateral STEMI. Patient was life flighted to [facility].        Procedures Performed:   Orders Placed This Encounter   Procedures    Cardiac catheterization    Cardiac ep lab eps/ablations       Summary of Hospital Course: Patient found to have 100% LAD stenosis s/p NATHAN. Echo showing new HFrEF 35%. Patient trial of HFrEF GDMT but BP did not allow full initiation and was discharged on reduced dose metoprolol 25mg qd, low dose Entresto and lasix 20mg given mild volume overload. Plans to follow-up with Heart Failure team within 1 week of discharge made.     Given pt VT / pAflutter, pt loaded with amiodarone and underwent ICD placement 3/27. Patient tolerated procedure and was continued on amiodarone with plans to follow-up with EP as outpatient. Cardiac MRI performed prior to procedure.    Patient also found to have new onset DM2 while inpt. Patient informed and discussed next steps as outpatient.    On the day of discharge, patient sitting up in bed. No complaints. Denies " "SOB/CP/n/v/d. Discharge medications explained. Discussed smoking cessation which patient agrees to go \"cold turkey.\"    Physical Exam:  General: No apparent distress, resting comfortably   Head: Normocephalic, atraumatic  Eyes: Anicteric, no conjunctival erythema  ENT: External ear normal, no nasal discharge  Neck: Trachea midline, no visible lymphadenopathy or goiter  Respiratory: Crackles present, improved with deep inhalation. Non-labored respirations, symmetric thorax expansion  Cardiovascular: RRR, no murmurs Extremities appear well-perfused  Abdomen: Non-distended  Extremities: Moves extremities spontaneously, no peripheral edema  Skin: No visible rashes, wounds, or jaundice  Neuro: A&O x 3, no gross focal deficits, no aphasia    PCP follow ups:  Pt needs follow-up with Heart failure within 7 days of dc and EP   Current Smoking habits  New Dx DM2 A1c 7.9  BMP in 7 days of DC  Increasing GDMT per HF    Significant Findings, Care, Treatment and Services Provided:   3/22 Echo--EF 35% akinetic: mid anterior, mid anteroseptal, apical anterior, apical inferior, apical lateral and apex.   3/22 Cath--Ost LAD to Prox LAD lesion is 100% stenosed. PCI s/p sucessful NATHAN   A1c 7.9  Cholesterol 323 / Triglycerides 185 / HDL 50 /    Cardiac MRI--pending final read  Medtronic Dual-Chamber ICD by Dr. Lama 3/27/24      Complications: none    Discharge Diagnosis: VT; A flutter s/p ICD; CAD; Type 2 MI; ICM; HFrEF 35%; DM2    Medical Problems       Resolved Problems  Date Reviewed: 3/23/2024            Resolved    Atrial flutter (HCC) 3/28/2024     Resolved by  Dia Swartz DO    VT (ventricular tachycardia) (HCC) 3/28/2024     Resolved by  Dia Swartz DO    Acute respiratory insufficiency 3/25/2024     Resolved by  Dia Swartz DO    Lactic acidosis 3/25/2024     Resolved by  Dia Swartz DO          Condition at Discharge: stable         Discharge instructions/Information to patient and family:   See after visit " summary for information provided to patient and family.      Provisions for Follow-Up Care:  See after visit summary for information related to follow-up care and any pertinent home health orders.      PCP: Alberta Paige DO    Disposition: Home    Planned Readmission: No      Discharge Statement   I spent 30 minutes discharging the patient. This time was spent on the day of discharge. I had direct contact with the patient on the day of discharge. Additional documentation is required if more than 30 minutes were spent on discharge.     Discharge Medications:  See after visit summary for reconciled discharge medications provided to patient and family.

## 2024-03-28 NOTE — PROGRESS NOTES
Progress Note - Electrophysiology  Vesna Langston 63 y.o. female MRN: [id number]  Unit/Bed#: [facility] 506-01 Encounter: [id number]      Assessment:  Medtronic dual-chamber ICD in situ  - implanted by Dr. Lama on 3/27/2024 due to scar related VT and ischemic cardiomyopathy  Ventricular tachycardia  - felt to be scar mediated, cMRI done this admission to delineate where scar is  - being loaded with amiodarone  - beta blocker therapy of metoprolol succinate  Atrial flutter with some concern for atrial fibrillation  - anticoagulated with Eliquis / [id number] score of 4 (age, sex, CHF, DM)  - EF of 35% per echo 3/2024 /moderate dilation of left atrium noted  - rate control: metoprolol succinate  - antiarrhythmic therapy: amiodarone, see #2  - prior cardioversion: none  - prior ablation: none  STEMI  - cardiac cath 3/2024 showing single-vessel CAD with ostial total occlusion of LAD status post IVUS guided PCI to ostial/proximal LAD and placement of NATHAN  - had been felt this was due to chronic scar rather than acute thrombus given marked difficulty crossing total occlusion with a wire and resistance to opening with balloon dilation  - now started on aspirin, Plavix, atorvastatin, and metoprolol succinate (triple therapy with Eliquis for only 1 week post cath)  Ischemic cardiomyopathy  - EF of 35% per echo 3/23/2024, repeat echo still showing EF of 35%  - now started on GDMT of metoprolol succinate and Entresto  Newly diagnosed diabetes mellitus  Hyperlipidemia  Tobacco use    Plan:  1. Device - Patient is doing well status post ICD implantation. Her incision is clean, dry, and intact without evidence of hematoma or ecchymosis or signs of infection. Vital signs and labs were reviewed. Assessment of chest x-rays show proper lead placement without evidence of pneumothorax. Device interrogation showed appropriate device function with lead parameters such as sensing, threshold, and impedance being tested.     2. Post care - Discharge  "instructions and restrictions were discussed with the patient in detail. She will also be provided with written instructions detailing what was discussed. Patient also requires a 2 week device and site check which has already been arranged and is in Epic.  Modified pressure bandage was removed with no evidence of hematoma at the site, she was advised to look for swelling in that area as she will be on triple therapy.    Patient is aware that she should not use bone stimulator but will be seen in the spine office next week to discuss different options.    3. Medications - In terms of medications, patient did have Eliquis restarted and pressure bandage was placed after device implantation.  This has been removed today and she can continue triple therapy for 1 week then lowered to Plavix and Eliquis afterwards. Patient is on goal directed medical therapy of metoprolol succinate and Entresto.    In terms of amiodarone, she will need to remain on 200 mg 3 times daily for 1 more week then lowered to 200 mg daily afterwards.  She will be seen by EP in the outpatient setting to manage this as she is relatively young to be maintained on this medication long-term and once she has recuperated from this hospitalization/STEMI, will need to consider switching her to class III antiarrhythmic.    4. Patient is stable from an EP standpoint for discharge at this time.     Subjective/Objective   Subjective: Vesna Langston is a 63 y.o. year old female with past medical history as stated above. She is hospital stay day 7 and is status post ICD implantation.     She reports no issues overnight, denies chest pain, shortness of breath, palpitations, lightheadedness or dizziness.     Telemetry shows sinus rhythm.    Objective:  Vitals: /62   Pulse 74   Temp 98.3 °F (36.8 °C) (Oral)   Resp 19   Ht 5' 8\" (1.727 m)   Wt 90.9 kg (200 lb 6.4 oz)   SpO2 96%   BMI 30.47 kg/m²     Vitals:    03/27/24 0443 03/28/24 0535   Weight: 91.1 kg " (200 lb 13.4 oz) 90.9 kg (200 lb 6.4 oz)     Orthostatic Blood Pressures      Flowsheet Row Most Recent Value   Blood Pressure 100/62 filed at 03/28/2024 0900   Patient Position - Orthostatic VS Lying filed at 03/28/2024 0222              Intake/Output Summary (Last 24 hours) at 3/28/2024 1019  Last data filed at 3/28/2024 0901  Gross per 24 hour   Intake 780 ml   Output 1000 ml   Net -220 ml       Invasive Devices       Peripheral Intravenous Line  Duration             Peripheral IV 03/26/24 Left;Upper;Ventral (anterior) Arm 1 day    Peripheral IV 03/27/24 Dorsal (posterior);Right Hand <1 day                              Scheduled Meds:  Current Facility-Administered Medications   Medication Dose Route Frequency Provider Last Rate    acetaminophen  650 mg Oral Q4H PRN Urmila Soden, PA-C      amiodarone  200 mg Oral TID With Meals Urmila Soden, PA-C      apixaban  5 mg Oral BID Urmila Soden, PA-C      aspirin  81 mg Oral Daily Urmila Soden, PA-C      atorvastatin  80 mg Oral QPM Urmila Soden, PA-C      clopidogrel  75 mg Oral Daily Urmila Soden, PA-C      gabapentin  100 mg Oral BID Urmila Soden, PA-C      HYDROcodone-acetaminophen  1 tablet Oral BID PRN Urmila Soden, PA-C      insulin glargine  7 Units Subcutaneous HS Dia Swartz DO      insulin lispro  1-5 Units Subcutaneous HS Urmila Soden, PA-C      insulin lispro  1-6 Units Subcutaneous TID AC Urmila Soden, PA-C      metoprolol succinate  50 mg Oral Daily Urmila Soden, PA-C      nicotine  21 mg Transdermal Daily Urmila Soden, PA-C      sacubitril-valsartan  1 tablet Oral BID Dia Swartz DO       Continuous Infusions:   PRN Meds:.  acetaminophen    HYDROcodone-acetaminophen    Review of Systems:  ROS as noted above, otherwise 12 point review of systems was performed and is negative.     Physical Exam:   GEN: NAD, alert and oriented, well appearing  SKIN: dry without significant lesions or rashes  HEENT: NCAT, PERRL, EOMs intact  NECK: No JVD  appreciated  CARDIOVASCULAR: regular  LUNGS: Good respiratory effort with no audible wheezes  PSYCH: Normal mood and affect  NEURO: CN ll-Xll grossly intact                Lab Results: I have personally reviewed pertinent lab results.    Results from last 7 days   Lab Units 03/27/24  0440 03/26/24  0332 03/25/24  0451   WBC Thousand/uL 12.05* 12.28* 12.03*   HEMOGLOBIN g/dL 15.1 15.9* 14.8   HEMATOCRIT % 44.8 48.1* 44.8   PLATELETS Thousands/uL 296 324 312     Results from last 7 days   Lab Units 03/27/24  0440 03/26/24  0332 03/25/24  0606   POTASSIUM mmol/L 4.1 3.9 3.8   CHLORIDE mmol/L 98 97 100   CO2 mmol/L 22 25 25   BUN mg/dL 17 20 23   CREATININE mg/dL 0.83 0.88 0.87   CALCIUM mg/dL 9.1 9.4 9.1         Results from last 7 days   Lab Units 03/26/24  0332 03/25/24  0606 03/24/24  1650   MAGNESIUM mg/dL 2.2 2.0 2.0       Imaging: I have personally reviewed pertinent reports.    No results found for this or any previous visit.      VTE Pharmacologic Prophylaxis: Eliuqis  VTE Mechanical Prophylaxis: sequential compression device

## 2024-03-28 NOTE — UTILIZATION REVIEW
"Continued Stay Review    Date: 3/25/24                          Current Patient Class: inpatient  Current Level of Care: med surg with telemetry  HPI:63 y.o. female initially admitted on 3/22/24     Assessment/Plan:   Type 2 MI. Ischemic cardiomyopathy and acute heart failure with reduced ejection fraction. Atrial flutter. VT .  Patient went into atrial flutter yesterday, but went back into sinus rhythm. Currently on IV amiodarone. Patient states she is very fatigued but symptomatically improving. Appears to be more symptomatic in atrial flutter. Continue dual antiplatelet therapy. On beta-blocker and statin. Continue Toprol-XL and hope to be able to add ARB or Entresto by discharge. Electrophysiology following and to decide timing of eventual atrial flutter ablation.     Per electrophysiology:  ST elevation MI post PCI  Monomorphic ventricular tachycardia  Reduced LVEF  Atrial flutter, possibility of PAF  Ischemic cardiomyopathy  Newly diagnosed diabetes mellitus  Hyperlipidemia  Tobacco use  Please proceed with cardiac MRI to estimate extent and position of scar  Considering monomorphic VT, prior , patient is a candidate for ICD  The scar is not going to improve with revascularization  Left side  He use antibiotic  Use contrast  Dual-chamber MRI compatible device      Vital Signs:   /60  Pulse 75  Temp 98.3 °F (36.8 °C) (Oral)  Resp 18  Ht 5' 8\" (1.727 m)  Wt 92.3 kg (203 lb 7.8 oz)  SpO2 98%  BMI 30.94 kg/m²     Pertinent Labs/Diagnostic Results:     Results from last 7 days   Lab Units 03/27/24  0440 03/26/24  0332 03/25/24  0451 03/24/24  0604 03/23/24  0549 03/22/24  1822   WBC Thousand/uL 12.05* 12.28* 12.03* 17.66* 18.62* 8.94   HEMOGLOBIN g/dL 15.1 15.9* 14.8 14.3 15.0 16.0*   HEMATOCRIT % 44.8 48.1* 44.8 43.9 45.9 48.3*   PLATELETS Thousands/uL 296 324 312 331 326 306   NEUTROS ABS Thousands/µL  --  7.15  --   --  15.97* 7.62     Results from last 7 days   Lab Units 03/27/24  0440 " 03/26/24  0332 03/25/24  0606 03/24/24  1650 03/24/24  0604 03/23/24  0549   SODIUM mmol/L 133* 133* 135 134* 135 134*   POTASSIUM mmol/L 4.1 3.9 3.8 4.1 4.0 4.9   CHLORIDE mmol/L 98 97 100 101 102 104   CO2 mmol/L 22 25 25 22 24 20*   ANION GAP mmol/L 13 11 10 11 9 10   BUN mg/dL 17 20 23 22 20 15   CREATININE mg/dL 0.83 0.88 0.87 0.83 0.81 0.69   EGFR ml/min/1.73sq m 74 69 70 74 76 91   CALCIUM mg/dL 9.1 9.4 9.1 9.0 9.2 8.8   CALCIUM, IONIZED mmol/L  --   --   --   --  1.11*  --    MAGNESIUM mg/dL  --  2.2 2.0 2.0 2.1 2.1   PHOSPHORUS mg/dL  --  4.5*  --   --   --   --      Results from last 7 days   Lab Units 03/23/24  0549 03/22/24  1822   AST U/L 52* 55*   ALT U/L 44 56*   ALK PHOS U/L 44 49   TOTAL PROTEIN g/dL 6.7 7.1   ALBUMIN g/dL 4.0 4.4   TOTAL BILIRUBIN mg/dL 0.76 0.64   BILIRUBIN DIRECT mg/dL 0.06  --      Results from last 7 days   Lab Units 03/28/24  1101 03/28/24  0607 03/27/24 2056 03/27/24  1547 03/27/24  1126 03/27/24  0631 03/26/24  2025 03/26/24  1540 03/26/24  1047 03/26/24  0628 03/25/24  2055 03/25/24  1748   POC GLUCOSE mg/dl 145* 155* 191* 172* 163* 170* 193* 209* 163* 188* 179* 184*     Results from last 7 days   Lab Units 03/27/24  0440 03/26/24  0332 03/25/24  0606 03/24/24  1650 03/24/24  0604 03/23/24  0549 03/22/24  1822   GLUCOSE RANDOM mg/dL 153* 151* 136 133 167* 258* 355*     Results from last 7 days   Lab Units 03/22/24 2156   HEMOGLOBIN A1C % 7.9*   EAG mg/dl 180     Results from last 7 days   Lab Units 03/23/24 0225 03/23/24 0029 03/22/24 2156 03/22/24  1822   HS TNI 0HR ng/L  --   --  930* 20   HS TNI 2HR ng/L  --  1,266*  --   --    HSTNI D2 ng/L  --  336*  --   --    HS TNI 4HR ng/L 1,381*  --   --   --    HSTNI D4 ng/L 451*  --   --   --      Results from last 7 days   Lab Units 03/23/24 2101 03/23/24 0940 03/23/24  0549 03/23/24 0029 03/22/24 2156 03/22/24  1822   LACTIC ACID mmol/L 1.7 2.8* 2.5* 3.0* 2.6* 3.0*     Results from last 7 days   Lab Units  03/22/24  1822   BNP pg/mL 447*     Medications:   Scheduled Medications:  Medications 03/19 03/20 03/21 03/22 03/23 03/24 03/25 03/26 03/27 03/28   adenosine (FOR EMS ONLY) (ADENOCARD) 6 mg/2 mL injection 18 mg  Dose: 3 each  Freq: Once Route: XX  Start: 03/22/24 1830 End: 03/22/24 1859   Order specific questions:          1859              amiodarone tablet 200 mg  Dose: 200 mg  Freq: 3 times daily with meals Route: PO  Start: 03/26/24 1630   Admin Instructions:              1651      0633     0748     1105     1129     1643      0726     1122     1630        apixaban (ELIQUIS) tablet 5 mg  Dose: 5 mg  Freq: 2 times daily Route: PO  Start: 03/27/24 1400   Admin Instructions:      Order specific questions:               1353      0824     1800        apixaban (ELIQUIS) tablet 5 mg  Dose: 5 mg  Freq: 2 times daily Route: PO  Start: 03/26/24 1400 End: 03/26/24 1329   Admin Instructions:      Order specific questions:              1329          apixaban (ELIQUIS) tablet 5 mg  Dose: 5 mg  Freq: 2 times daily Route: PO  Start: 03/25/24 1800 End: 03/25/24 1711   Admin Instructions:      Order specific questions:             1711           apixaban (ELIQUIS) tablet 5 mg  Dose: 5 mg  Freq: 2 times daily Route: PO  Start: 03/23/24 1115 End: 03/25/24 1149   Admin Instructions:      Order specific questions:           1129     1705     1800     1805      0809     1717      0846     1149-D/C'd         aspirin chewable tablet 324 mg  Dose: 324 mg  Freq: Once Route: PO  Start: 03/22/24 1830 End: 03/22/24 1848       1848              aspirin chewable tablet 81 mg  Dose: 81 mg  Freq: Daily Route: PO  Start: 03/23/24 0900   Admin Instructions:           0803     1800     1805      0809      0846      0802      0748     0900     1105     1133 [C]      0824        atorvastatin (LIPITOR) tablet 80 mg  Dose: 80 mg  Freq: Every evening Route: PO  Start: 03/22/24 2130       2231      1705     1800     1805      1718      1711      1651       0748     1105     1717      1800         clopidogrel (PLAVIX) tablet 600 mg  Dose: 600 mg  Freq: Once Route: PO  Start: 03/23/24 2100 End: 03/23/24 2132   Admin Instructions:           1800     1805     2132             Followed by  clopidogrel (PLAVIX) tablet 75 mg  Dose: 75 mg  Freq: Daily Route: PO  Indications of Use: ACUTE CORONARY SYNDROME  Start: 03/24/24 0900   Admin Instructions:           1800     1805      0809      0846      0802      0748     0900     1105     1131 [C]      0824        etomidate (AMIDATE) 2 mg/mL injection 15 mg  Dose: 15 mg  Freq: Once Route: IV  Start: 03/22/24 1815 End: 03/22/24 1811       1811              furosemide (LASIX) injection 40 mg  Dose: 40 mg  Freq: 2 times daily (diuretic) Route: IV  Start: 03/24/24 1745 End: 03/25/24 1711   Admin Instructions:      Order specific questions:            1751      1039     1711           furosemide (LASIX) injection 40 mg  Dose: 40 mg  Freq: 2 times daily (diuretic) Route: IV  Start: 03/23/24 1600 End: 03/24/24 1734   Admin Instructions:      Order specific questions:           1638     1800     1805      0809     1628     1734-D/C'd          furosemide (LASIX) tablet 40 mg  Dose: 40 mg  Freq: 2 times daily (diuretic) Route: PO  Start: 03/23/24 0800 End: 03/23/24 1104   Admin Instructions:      Order specific questions:           0840 [C]     1104-D/C'd           gabapentin (NEURONTIN) capsule 100 mg  Dose: 100 mg  Freq: 2 times daily Route: PO  Start: 03/23/24 1230   Admin Instructions:           1417     1705     1800     1805      0809     1718      0846     1711      (0802)     1651      0748     0900     1105     1129 [C]     1717      0824     1800        heparin (ACS LOW)  Freq: Once Route: IV  Start: 03/22/24 1830 End: 03/22/24 1831   Admin Instructions:      Order specific questions:          1831-D/C'd  (1845)              heparin (porcine) injection 4,000 Units  Dose: 4,000 Units  Freq: Once Route: IV  Start: 03/22/24 1845  End: 03/22/24 1848   Admin Instructions:          1848              heparin (porcine) subcutaneous injection 5,000 Units  Dose: 5,000 Units  Freq: Every 8 hours scheduled Route: SC  Start: 03/23/24 0600 End: 03/25/24 1239   Admin Instructions:           0549     1417     1800     1805     2135      0600     1357     2135      0500     1239-D/C'd         insulin glargine (LANTUS) subcutaneous injection 7 Units 0.07 mL  Dose: 7 Units  Freq: Daily at bedtime Route: SC  Start: 03/27/24 2200   Admin Instructions:               2109      2200        insulin lispro (HumALOG/ADMELOG) 100 units/mL subcutaneous injection 1-5 Units  Dose: 1-5 Units  Freq: Daily at bedtime Route: SC  Start: 03/22/24 2245   Admin Instructions:           0055 [C]     1800     1805     2135 [C]      2128      2125      2116      0748     1105     2110      2200         insulin lispro (HumALOG/ADMELOG) 100 units/mL subcutaneous injection 1-6 Units  Dose: 1-6 Units  Freq: 3 times daily before meals Route: SC  Start: 03/23/24 0700   Admin Instructions:           0803     1118     1643     1800     1805      0744     1103     1610      0743     1042     1627      (0656)     (1113)     1651      0632 [C]     0748     1105     1135 [C]     1645      0728     (1112)     1600        losartan (COZAAR) tablet 25 mg  Dose: 25 mg  Freq: Daily Route: PO  Start: 03/24/24 1045 End: 03/25/24 1044   Order specific questions:            1102      0846     1044-D/C'd         metoprolol (LOPRESSOR) injection 2.5 mg  Dose: 2.5 mg  Freq: Once Route: IV  Start: 03/22/24 1845 End: 03/22/24 1920   Admin Instructions:          (1900) [C]     1920-D/C'd            metoprolol (LOPRESSOR) injection 2.5 mg  Dose: 2.5 mg  Freq: Once Route: IV  Start: 03/22/24 1830 End: 03/22/24 1834   Admin Instructions:          1834              metoprolol (LOPRESSOR) injection 5 mg  Dose: 5 mg  Freq: Once Route: IV  Indications of Use: ATRIAL FLUTTER  Start: 03/23/24 2230 End: 03/24/24 0018    Admin Instructions:            0018 [C]            metoprolol succinate (TOPROL-XL) 24 hr tablet 25 mg  Dose: 25 mg  Freq: Daily Route: PO  Start: 03/23/24 0900 End: 03/23/24 2230   Admin Instructions:      Order specific questions:           1129 [C]     1800     1805     2230-D/C'd           metoprolol succinate (TOPROL-XL) 24 hr tablet 50 mg  Dose: 50 mg  Freq: Daily Route: PO  Start: 03/24/24 0900   Admin Instructions:      Order specific questions:            0809      0846      0802      0748     0900     1105     1132 [C]      0948 [C]        metoprolol tartrate (LOPRESSOR) tablet 25 mg  Dose: 25 mg  Freq: Once Route: PO  Start: 03/23/24 2245 End: 03/23/24 2244   Admin Instructions:      Order specific questions:           2244 [C]             nicotine (NICODERM CQ) 21 mg/24 hr TD 24 hr patch 21 mg  Dose: 21 mg  Freq: Daily Route: TD  Start: 03/23/24 2200   Admin Instructions:           2149      2128     2126      2124 (2124) 1518      0746     1100 (2002) 8801        ondansetron (ZOFRAN) injection 4 mg  Dose: 4 mg  Freq: Once Route: IV  Start: 03/25/24 1830 End: 03/25/24 1838   Admin Instructions:             1838           ondansetron (ZOFRAN-ODT) dispersible tablet 4 mg  Dose: 4 mg  Freq: Once Route: PO  Start: 03/26/24 0945 End: 03/26/24 1006   Admin Instructions:              1006          potassium chloride (Klor-Con M20) CR tablet 20 mEq  Dose: 20 mEq  Freq: Once Route: PO  Start: 03/26/24 0930 End: 03/26/24 1006   Admin Instructions:              1006          potassium chloride (Klor-Con M20) CR tablet 20 mEq  Dose: 20 mEq  Freq: Once Route: PO  Start: 03/25/24 1100 End: 03/25/24 1047   Admin Instructions:             1047           sacubitril-valsartan (ENTRESTO) 24-26 MG per tablet 1 tablet  Dose: 1 tablet  Freq: 2 times daily Route: PO  Start: 03/27/24 1800   Order specific questions:               1714 (0959)     1800        sacubitril-valsartan (ENTRESTO) 24-26 MG per  tablet 1 tablet  Dose: 1 tablet  Freq: 2 times daily Route: PO  Start: 03/24/24 1045 End: 03/24/24 1036   Order specific questions:            1036-D/C'd          sodium chloride 0.9 % bolus 1,000 mL  Dose: 1,000 mL  Freq: Once Route: IV  Last Dose: 1,000 mL (03/22/24 1810)  Start: 03/22/24 1815 End: 03/22/24 1910 1810               ticagrelor (BRILINTA) tablet 180 mg  Dose: 180 mg  Freq: Once Route: PO  Start: 03/22/24 1830 End: 03/22/24 1848 1848              Followed by  ticagrelor (BRILINTA) tablet 90 mg  Dose: 90 mg  Freq: Every 12 hours scheduled Route: PO  Start: 03/23/24 0625 End: 03/22/24 1920 1920-D/C'd            ticagrelor (BRILINTA) tablet 90 mg  Dose: 90 mg  Freq: 2 times daily Route: PO  Start: 03/22/24 2130 End: 03/23/24 1104   Admin Instructions:          2231      0803     1104-D/C'd                       Continuous Meds Sorted by Name  for Vesna Langston as of 03/19/24 through 3/28/24  Legend:       Medications 03/19 03/20 03/21 03/22 03/23 03/24 03/25 03/26 03/27 03/28   amiodarone (CORDARONE) 900 mg in dextrose 5 % 500 mL infusion  Rate: 16.7 mL/hr Dose: 0.5 mg/min  Freq: Continuous Route: IV  Last Dose: Stopped (03/27/24 1111)  Start: 03/22/24 2000 End: 03/27/24 1105   Admin Instructions:          2026      0246     1801       0428      0937      1105-D/C'd  1111         heparin (porcine) 25,000 units in 0.45% NaCl 250 mL infusion (premix)  Rate: 2.7-18 mL/hr Dose: 3-20 Units/kg/hr  Weight Dosing Info: 90 kg (Order-Specific)  Freq: Titrated Route: IV  Start: 03/22/24 1845 End: 03/22/24 1920   Admin Instructions:      Order specific questions:          (1859) [C]     1920-D/C'd            norepinephrine (LEVOPHED) 8 mg (DOUBLE CONCENTRATION) IV in sodium chloride 0.9% 250 mL  Freq: Continuous PRN Route: IV  Last Dose: Stopped (03/27/24 1016)  Start: 03/27/24 0909 End: 03/27/24 1036            0855     0925     1010     1016     1036-D/C'd       propofol (DIPRIVAN) 1000 mg in 100  mL infusion (premix)  Freq: Continuous PRN Route: IV  Last Dose: Stopped (03/27/24 1022)  Start: 03/27/24 0854 End: 03/27/24 1036            0854     0858     0904     0949     1012     1022     1036-D/C'd       sodium chloride 0.9 % infusion  Freq: Continuous PRN Route: IV  Last Dose: Stopped (03/27/24 1016)  Start: 03/27/24 0839 End: 03/27/24 1036            0839     0900     1000     1016     1036-D/C'd       sodium chloride 0.9 % infusion  Rate: 50 mL/hr Dose: 50 mL/hr  Freq: Continuous Route: IV  Indications of Use: IV Hydration  Last Dose: Stopped (03/23/24 0400)  Start: 03/22/24 2115 End: 03/23/24 0347 2148      0347-D/C'd  0400                         PRN Meds Sorted by Name  for Vesna Langston as of 03/19/24 through 3/28/24  Legend:         Medications 03/19 03/20 03/21 03/22 03/23 03/24 03/25 03/26 03/27 03/28   acetaminophen (TYLENOL) tablet 650 mg  Dose: 650 mg  Freq: Every 4 hours PRN Route: PO  PRN Reason: mild pain  Start: 03/22/24 2129        1800     1805     2137      0253     1610     2137      0742      2147      0748     1105     1252     2004      0632        adenosine (ADENOCARD) injection  Freq: Code/trauma/sedation medication Route: IV  Start: 03/22/24 2056 End: 03/22/24 2113 2056 [C]     2113-D/C'd            amiodarone 150 mg/3 mL injection  Freq: Code/trauma/sedation medication Route: IV  Start: 03/22/24 1956 End: 03/22/24 2113 1956 2113-D/C'd            amiodarone 150 mg/3 mL injection  Freq: Code/trauma/sedation medication  Start: 03/22/24 1803 End: 03/22/24 1803 1803              ceFAZolin (ANCEF) IVPB (premix in dextrose)  Freq: As needed Route: IV  Start: 03/27/24 0850 End: 03/27/24 1036            0850     1036-D/C'd       fentaNYL injection  Freq: As needed Route: IV  Start: 03/27/24 0846 End: 03/27/24 1036            0846     0905     1036-D/C'd       fentaNYL injection  Freq: Code/trauma/sedation medication Route: IV  Start: 03/22/24 1931 End: 03/22/24  2113 1931 1945 2015 2020 2057 2113-D/C'd            furosemide (LASIX) injection  Freq: Code/trauma/sedation medication Route: IV  Start: 03/22/24 2113 End: 03/22/24 2113 2113 2113-D/C'd            Gadobutrol injection (SINGLE-DOSE) SOLN 20 mL  Dose: 20 mL  Freq: Once in imaging Route: IV  PRN Reason: contrast  Start: 03/26/24 1221 End: 03/26/24 1236           1236          heparin (porcine) injection  Freq: Code/trauma/sedation medication Route: IV  Start: 03/22/24 1949 End: 03/22/24 2113 1949 2113-D/C'd            HYDROcodone-acetaminophen (NORCO) 5-325 mg per tablet 1 tablet  Dose: 1 tablet  Freq: 2 times daily PRN Route: PO  PRN Reasons: moderate pain,severe pain  Start: 03/25/24 1043   Admin Instructions:             1047       0444     0748     1105     1642      0207        iohexol (OMNIPAQUE) 350 MG/ML injection (SINGLE-DOSE)  Freq: Code/trauma/sedation medication  Start: 03/22/24 2033 End: 03/22/24 2113 2033 2101 2113-D/C'd            lidocaine (PF) (XYLOCAINE-MPF) 1 % injection  Freq: Code/trauma/sedation medication Route: INFILTRATION  Start: 03/27/24 0918 End: 03/27/24 1032            0918     1032-D/C'd       lidocaine (PF) (XYLOCAINE-MPF) 1 % injection  Freq: Code/trauma/sedation medication Route: INFILTRATION  Start: 03/22/24 1934 End: 03/22/24 2113 1934 1944 2113-D/C'd            magnesium sulfate 1 g/2 mL injection  Freq: Code/trauma/sedation medication  Start: 03/22/24 1802 End: 03/22/24 1802 1802              metoprolol (LOPRESSOR) injection  Freq: Code/trauma/sedation medication Route: IV  Start: 03/22/24 1935 End: 03/22/24 2113 1935 2113-D/C'd            midazolam (VERSED) injection  Freq: As needed Route: IV  Start: 03/27/24 0847 End: 03/27/24 1036            0847     0852     1036-D/C'd       midazolam (VERSED) injection  Freq: Code/trauma/sedation medication Route: IV  Start: 03/22/24 1931 End: 03/22/24  2113 1931 1945 2015 2021 2113-D/C'd            nitroGLYcerin 200 mcg/mL IA bolus  Freq: Code/trauma/sedation medication Route: IA  Start: 03/22/24 1939 End: 03/22/24 2113 1939 2113-D/C'd            norepinephrine (LEVOPHED) 8 mg (DOUBLE CONCENTRATION) IV in sodium chloride 0.9% 250 mL  Freq: Continuous PRN Route: IV  Start: 03/27/24 0909 End: 03/27/24 1036            0855     0925     1010     1016     1036-D/C'd       ondansetron (ZOFRAN) injection  Freq: As needed Route: IV  Start: 03/27/24 1017 End: 03/27/24 1036            1017     1036-D/C'd       perflutren lipid microsphere (DEFINITY) injection  Dose: 0.8 mL/min  Freq: Once in imaging Route: IV  PRN Reason: contrast  Start: 03/23/24 1154 End: 03/23/24 1050   Admin Instructions:           1050             propofol (DIPRIVAN) 1000 mg in 100 mL infusion (premix)  Freq: Continuous PRN Route: IV  Start: 03/27/24 0854 End: 03/27/24 1036            0854     0858     0904     0949     1012     1022     1036-D/C'd       propofol (DIPRIVAN) 200 MG/20ML bolus injection  Freq: As needed Route: IV  Start: 03/27/24 0855 End: 03/27/24 1036            0853     1036-D/C'd       sodium chloride 0.9 % infusion  Freq: Continuous PRN Route: IV  Start: 03/27/24 0839 End: 03/27/24 1036            0839     0900     1000     1016     1036-D/C'd       verapamil (ISOPTIN) injection  Freq: Code/trauma/sedation medication Route: IA  Start: 03/22/24 1939 End: 03/22/24 2113 1939 2113-D/C'd              Discharge Plan: d    Network Utilization Review Department  ATTENTION: Please call with any questions or concerns to [phone] and carefully listen to the prompts so that you are directed to the right person. All voicemails are confidential.   For Discharge needs, contact Care Management DC Support Team at [phone] opt. 2  Send all requests for admission clinical reviews, approved or denied determinations and any other requests to dedicated  fax number below belonging to the campus where the patient is receiving treatment. List of dedicated fax numbers for the Facilities:  FACILITY NAME UR FAX NUMBER   ADMISSION DENIALS (Administrative/Medical Necessity) [phone]   DISCHARGE SUPPORT TEAM (NETWORK) [phone]   PARENT CHILD HEALTH (Maternity/NICU/Pediatrics) [phone]   [facility] [phone]   [facility] [phone]   [facility] [phone]   [facility] [phone]   [facility] [phone]   [facility] [phone]   [facility] [phone]   [facility] [phone]   [facility] [phone]   Psychiatric hospital [phone]   General acute hospital [phone]   [facility] [phone]

## 2024-03-28 NOTE — RESTORATIVE TECHNICIAN NOTE
Restorative Technician Note      Patient Name: Vesna Langston     Note Type: Mobility  Patient Position Upon Consult: Supine  Activity Performed: Ambulated  Patient Position at End of Consult: Supine; All needs within reach

## 2024-03-28 NOTE — PLAN OF CARE
Problem: Prexisting or High Potential for Compromised Skin Integrity  Goal: Skin integrity is maintained or improved  Description: INTERVENTIONS:  - Identify patients at risk for skin breakdown  - Assess and monitor skin integrity  - Assess and monitor nutrition and hydration status  - Monitor labs   - Assess for incontinence   - Turn and reposition patient  - Assist with mobility/ambulation  - Relieve pressure over bony prominences  - Avoid friction and shearing  - Provide appropriate hygiene as needed including keeping skin clean and dry  - Evaluate need for skin moisturizer/barrier cream  - Collaborate with interdisciplinary team   - Patient/family teaching  - Consider wound care consult   Outcome: Progressing     Problem: PAIN - ADULT  Goal: Verbalizes/displays adequate comfort level or baseline comfort level  Description: Interventions:  - Encourage patient to monitor pain and request assistance  - Assess pain using appropriate pain scale  - Administer analgesics based on type and severity of pain and evaluate response  - Implement non-pharmacological measures as appropriate and evaluate response  - Consider cultural and social influences on pain and pain management  - Notify physician/advanced practitioner if interventions unsuccessful or patient reports new pain  Outcome: Progressing     Problem: INFECTION - ADULT  Goal: Absence or prevention of progression during hospitalization  Description: INTERVENTIONS:  - Assess and monitor for signs and symptoms of infection  - Monitor lab/diagnostic results  - Monitor all insertion sites, i.e. indwelling lines, tubes, and drains  - Monitor endotracheal if appropriate and nasal secretions for changes in amount and color  - [place] appropriate cooling/warming therapies per order  - Administer medications as ordered  - Instruct and encourage patient and family to use good hand hygiene technique  - Identify and instruct in appropriate isolation precautions for  identified infection/condition  Outcome: Progressing     Problem: SAFETY ADULT  Goal: Patient will remain free of falls  Description: INTERVENTIONS:  - Educate patient/family on patient safety including physical limitations  - Instruct patient to call for assistance with activity   - Consult OT/PT to assist with strengthening/mobility   - Keep Call bell within reach  - Keep bed low and locked with side rails adjusted as appropriate  - Keep care items and personal belongings within reach  - Initiate and maintain comfort rounds  - Make Fall Risk Sign visible to staff  - Offer Toileting every  Hours, in advance of need  - Initiate/Maintain alarm  - Obtain necessary fall risk management equipment:   - Apply yellow socks and bracelet for high fall risk patients  - Consider moving patient to room near nurses station  Outcome: Progressing  Goal: Maintain or return to baseline ADL function  Description: INTERVENTIONS:  -  Assess patient's ability to carry out ADLs; assess patient's baseline for ADL function and identify physical deficits which impact ability to perform ADLs (bathing, care of mouth/teeth, toileting, grooming, dressing, etc.)  - Assess/evaluate cause of self-care deficits   - Assess range of motion  - Assess patient's mobility; develop plan if impaired  - Assess patient's need for assistive devices and provide as appropriate  - Encourage maximum independence but intervene and supervise when necessary  - Involve family in performance of ADLs  - Assess for home care needs following discharge   - Consider OT consult to assist with ADL evaluation and planning for discharge  - Provide patient education as appropriate  Outcome: Progressing     Problem: DISCHARGE PLANNING  Goal: Discharge to home or other facility with appropriate resources  Description: INTERVENTIONS:  - Identify barriers to discharge w/patient and caregiver  - Arrange for needed discharge resources and transportation as appropriate  - Identify  discharge learning needs (meds, wound care, etc.)  - Arrange for interpretive services to assist at discharge as needed  - Refer to Case Management Department for coordinating discharge planning if the patient needs post-hospital services based on physician/advanced practitioner order or complex needs related to functional status, cognitive ability, or social support system  Outcome: Progressing     Problem: Knowledge Deficit  Goal: Patient/family/caregiver demonstrates understanding of disease process, treatment plan, medications, and discharge instructions  Description: Complete learning assessment and assess knowledge base.  Interventions:  - Provide teaching at level of understanding  - Provide teaching via preferred learning methods  Outcome: Progressing     Problem: Nutrition/Hydration-ADULT  Goal: Nutrient/Hydration intake appropriate for improving, restoring or maintaining nutritional needs  Description: Monitor and assess patient's nutrition/hydration status for malnutrition. Collaborate with interdisciplinary team and initiate plan and interventions as ordered.  Monitor patient's weight and dietary intake as ordered or per policy. Utilize nutrition screening tool and intervene as necessary. Determine patient's food preferences and provide high-protein, high-caloric foods as appropriate.     INTERVENTIONS:  - Monitor oral intake, urinary output, labs, and treatment plans  - Assess nutrition and hydration status and recommend course of action  - Evaluate amount of meals eaten  - Assist patient with eating if necessary   - Allow adequate time for meals  - Recommend/ encourage appropriate diets, oral nutritional supplements, and vitamin/mineral supplements  - Order, calculate, and assess calorie counts as needed  - Recommend, monitor, and adjust tube feedings and TPN/PPN based on assessed needs  - Assess need for intravenous fluids  - Provide specific nutrition/hydration education as appropriate  - Include  patient/family/caregiver in decisions related to nutrition  Outcome: Progressing     Problem: SKIN/TISSUE INTEGRITY - ADULT  Goal: Skin Integrity remains intact(Skin Breakdown Prevention)  Description: Assess:  -Perform [place] assessment every   -Clean and moisturize skin every   -Inspect skin when repositioning, toileting, and assisting with ADLS  -Assess under medical devices such as  every   -Assess extremities for adequate circulation and sensation     Bed Management:  -Have minimal linens on bed & keep smooth, unwrinkled  -Change linens as needed when moist or perspiring  -Avoid sitting or lying in one position for more than  hours while in bed  -Keep HOB at degrees     Toileting:  -Offer bedside commode  -Assess for incontinence every   -Use incontinent care products after each incontinent episode such as     Activity:  -Mobilize patient  times a day  -Encourage activity and walks on unit  -Encourage or provide ROM exercises   -Turn and reposition patient every  Hours  -Use appropriate equipment to lift or move patient in bed  -Instruct/ Assist with weight shifting every  when out of bed in chair  -Consider limitation of chair time  hour intervals    Skin Care:  -Avoid use of baby powder, tape, friction and shearing, hot water or constrictive clothing  -Relieve pressure over bony prominences using   -Do not massage red bony areas    Next Steps:  -Teach patient strategies to minimize risks such as    -Consider consults to  interdisciplinary teams such as   Outcome: Progressing  Goal: Incision(s), wounds(s) or drain site(s) healing without S/S of infection  Description: INTERVENTIONS  - Assess and document dressing, incision, wound bed, drain sites and surrounding tissue  - Provide patient and family education  - Perform skin care/dressing changes every  Outcome: Progressing

## 2024-04-01 ENCOUNTER — APPOINTMENT (EMERGENCY)
Dept: RADIOLOGY | Facility: HOSPITAL | Age: 64
End: 2024-04-01
Payer: COMMERCIAL

## 2024-04-01 ENCOUNTER — HOSPITAL ENCOUNTER (OUTPATIENT)
Facility: HOSPITAL | Age: 64
Setting detail: OBSERVATION
Discharge: HOME | End: 2024-04-01
Attending: EMERGENCY MEDICINE | Admitting: INTERNAL MEDICINE
Payer: COMMERCIAL

## 2024-04-01 ENCOUNTER — APPOINTMENT (EMERGENCY)
Dept: CT IMAGING | Facility: HOSPITAL | Age: 64
End: 2024-04-01
Payer: COMMERCIAL

## 2024-04-01 VITALS
BODY MASS INDEX: 30.07 KG/M2 | HEART RATE: 70 BPM | WEIGHT: 198.41 LBS | HEIGHT: 68 IN | TEMPERATURE: 97.9 F | SYSTOLIC BLOOD PRESSURE: 107 MMHG | DIASTOLIC BLOOD PRESSURE: 56 MMHG | OXYGEN SATURATION: 98 % | RESPIRATION RATE: 18 BRPM

## 2024-04-01 DIAGNOSIS — R19.5 GUAIAC POSITIVE STOOLS: ICD-10-CM

## 2024-04-01 DIAGNOSIS — Z95.810 S/P ICD (INTERNAL CARDIAC DEFIBRILLATOR) PROCEDURE: ICD-10-CM

## 2024-04-01 DIAGNOSIS — K62.5 BRIGHT RED BLOOD PER RECTUM: ICD-10-CM

## 2024-04-01 DIAGNOSIS — I50.20 HFREF (HEART FAILURE WITH REDUCED EJECTION FRACTION) (HCC): ICD-10-CM

## 2024-04-01 DIAGNOSIS — I25.2 HISTORY OF ST ELEVATION MYOCARDIAL INFARCTION (STEMI): ICD-10-CM

## 2024-04-01 DIAGNOSIS — K59.00 CONSTIPATION: Primary | ICD-10-CM

## 2024-04-01 DIAGNOSIS — R55 SYNCOPE: ICD-10-CM

## 2024-04-01 PROBLEM — Z86.79 HISTORY OF SUSTAINED VENTRICULAR TACHYCARDIA: Chronic | Status: ACTIVE | Noted: 2024-03-24

## 2024-04-01 PROBLEM — I48.92 ATRIAL FLUTTER, PAROXYSMAL (HCC): Chronic | Status: ACTIVE | Noted: 2024-03-22

## 2024-04-01 PROBLEM — Z95.5 S/P CORONARY ARTERY STENT PLACEMENT: Chronic | Status: ACTIVE | Noted: 2024-03-23

## 2024-04-01 PROBLEM — U07.1 COVID-19 VIRUS INFECTION: Status: RESOLVED | Noted: 2022-11-28 | Resolved: 2024-04-01

## 2024-04-01 PROBLEM — Z86.79 HISTORY OF SUSTAINED VENTRICULAR TACHYCARDIA: Chronic | Status: ACTIVE | Noted: 2024-03-22

## 2024-04-01 PROBLEM — U07.1 COVID-19 VIRUS INFECTION: Status: ACTIVE | Noted: 2022-11-28

## 2024-04-01 PROBLEM — I25.10 CORONARY ARTERY DISEASE INVOLVING NATIVE CORONARY ARTERY OF NATIVE HEART: Chronic | Status: ACTIVE | Noted: 2024-03-23

## 2024-04-01 PROBLEM — M54.9 BACK PAIN: Status: ACTIVE | Noted: 2022-07-31

## 2024-04-01 PROBLEM — J40 BRONCHITIS: Status: ACTIVE | Noted: 2022-11-28

## 2024-04-01 PROBLEM — I25.5 ISCHEMIC CARDIOMYOPATHY: Chronic | Status: ACTIVE | Noted: 2024-03-24

## 2024-04-01 PROBLEM — M54.32 SCIATICA OF LEFT SIDE: Status: ACTIVE | Noted: 2022-07-31

## 2024-04-01 PROBLEM — R09.89 SYMPTOMS OF UPPER RESPIRATORY INFECTION (URI): Status: ACTIVE | Noted: 2023-03-04

## 2024-04-01 LAB
2HR DELTA HS TROPONIN: -20 NG/L
4HR DELTA HS TROPONIN: -33 NG/L
ALBUMIN SERPL BCP-MCNC: 4 G/DL (ref 3.5–5)
ALP SERPL-CCNC: 39 U/L (ref 34–104)
ALT SERPL W P-5'-P-CCNC: 32 U/L (ref 7–52)
ANION GAP SERPL CALCULATED.3IONS-SCNC: 9 MMOL/L (ref 4–13)
AST SERPL W P-5'-P-CCNC: 42 U/L (ref 13–39)
ATRIAL RATE: 68 BPM
BASOPHILS # BLD AUTO: 0.07 THOUSANDS/ÂΜL (ref 0–0.1)
BASOPHILS NFR BLD AUTO: 1 % (ref 0–1)
BILIRUB SERPL-MCNC: 0.72 MG/DL (ref 0.2–1)
BNP SERPL-MCNC: 269 PG/ML (ref 0–100)
BUN SERPL-MCNC: 15 MG/DL (ref 5–25)
CALCIUM SERPL-MCNC: 8.8 MG/DL (ref 8.4–10.2)
CARDIAC TROPONIN I PNL SERPL HS: 101 NG/L
CARDIAC TROPONIN I PNL SERPL HS: 105 NG/L
CARDIAC TROPONIN I PNL SERPL HS: 125 NG/L
CARDIAC TROPONIN I PNL SERPL HS: 92 NG/L
CHLORIDE SERPL-SCNC: 102 MMOL/L (ref 96–108)
CO2 SERPL-SCNC: 22 MMOL/L (ref 21–32)
CREAT SERPL-MCNC: 0.84 MG/DL (ref 0.6–1.3)
EOSINOPHIL # BLD AUTO: 0.25 THOUSAND/ÂΜL (ref 0–0.61)
EOSINOPHIL NFR BLD AUTO: 2 % (ref 0–6)
ERYTHROCYTE [DISTWIDTH] IN BLOOD BY AUTOMATED COUNT: 13.8 % (ref 11.6–15.1)
GFR SERPL CREATININE-BSD FRML MDRD: 72 ML/MIN/1.73SQ M
GLUCOSE SERPL-MCNC: 135 MG/DL (ref 65–140)
GLUCOSE SERPL-MCNC: 154 MG/DL (ref 65–140)
GLUCOSE SERPL-MCNC: 192 MG/DL (ref 65–140)
HCT VFR BLD AUTO: 42.3 % (ref 34.8–46.1)
HGB BLD-MCNC: 14 G/DL (ref 11.5–15.4)
IMM GRANULOCYTES # BLD AUTO: 0.06 THOUSAND/UL (ref 0–0.2)
IMM GRANULOCYTES NFR BLD AUTO: 1 % (ref 0–2)
LACTATE SERPL-SCNC: 1.2 MMOL/L (ref 0.5–2)
LYMPHOCYTES # BLD AUTO: 2.52 THOUSANDS/ÂΜL (ref 0.6–4.47)
LYMPHOCYTES NFR BLD AUTO: 20 % (ref 14–44)
MAGNESIUM SERPL-MCNC: 2.1 MG/DL (ref 1.9–2.7)
MCH RBC QN AUTO: 30.1 PG (ref 26.8–34.3)
MCHC RBC AUTO-ENTMCNC: 33.1 G/DL (ref 31.4–37.4)
MCV RBC AUTO: 91 FL (ref 82–98)
MONOCYTES # BLD AUTO: 1.01 THOUSAND/ÂΜL (ref 0.17–1.22)
MONOCYTES NFR BLD AUTO: 8 % (ref 4–12)
NEUTROPHILS # BLD AUTO: 8.42 THOUSANDS/ÂΜL (ref 1.85–7.62)
NEUTS SEG NFR BLD AUTO: 68 % (ref 43–75)
NRBC BLD AUTO-RTO: 0 /100 WBCS
P AXIS: 66 DEGREES
PLATELET # BLD AUTO: 282 THOUSANDS/UL (ref 149–390)
PMV BLD AUTO: 10.2 FL (ref 8.9–12.7)
POTASSIUM SERPL-SCNC: 4.6 MMOL/L (ref 3.5–5.3)
PR INTERVAL: 174 MS
PROCALCITONIN SERPL-MCNC: 0.1 NG/ML
PROT SERPL-MCNC: 7 G/DL (ref 6.4–8.4)
QRS AXIS: 52 DEGREES
QRSD INTERVAL: 92 MS
QT INTERVAL: 520 MS
QTC INTERVAL: 552 MS
RBC # BLD AUTO: 4.65 MILLION/UL (ref 3.81–5.12)
SODIUM SERPL-SCNC: 133 MMOL/L (ref 135–147)
T WAVE AXIS: 81 DEGREES
TSH SERPL DL<=0.05 MIU/L-ACNC: 57.61 UIU/ML (ref 0.45–4.5)
VENTRICULAR RATE: 68 BPM
WBC # BLD AUTO: 12.33 THOUSAND/UL (ref 4.31–10.16)

## 2024-04-01 PROCEDURE — 93005 ELECTROCARDIOGRAM TRACING: CPT

## 2024-04-01 PROCEDURE — 83605 ASSAY OF LACTIC ACID: CPT

## 2024-04-01 PROCEDURE — NC001 PR NO CHARGE: Performed by: INTERNAL MEDICINE

## 2024-04-01 PROCEDURE — 83880 ASSAY OF NATRIURETIC PEPTIDE: CPT

## 2024-04-01 PROCEDURE — 36415 COLL VENOUS BLD VENIPUNCTURE: CPT

## 2024-04-01 PROCEDURE — 80053 COMPREHEN METABOLIC PANEL: CPT

## 2024-04-01 PROCEDURE — 84443 ASSAY THYROID STIM HORMONE: CPT

## 2024-04-01 PROCEDURE — 93010 ELECTROCARDIOGRAM REPORT: CPT | Performed by: INTERNAL MEDICINE

## 2024-04-01 PROCEDURE — 84484 ASSAY OF TROPONIN QUANT: CPT

## 2024-04-01 PROCEDURE — 74178 CT ABD&PLV WO CNTR FLWD CNTR: CPT

## 2024-04-01 PROCEDURE — 84145 PROCALCITONIN (PCT): CPT

## 2024-04-01 PROCEDURE — 85025 COMPLETE CBC W/AUTO DIFF WBC: CPT

## 2024-04-01 PROCEDURE — 99285 EMERGENCY DEPT VISIT HI MDM: CPT | Performed by: EMERGENCY MEDICINE

## 2024-04-01 PROCEDURE — 99223 1ST HOSP IP/OBS HIGH 75: CPT | Performed by: INTERNAL MEDICINE

## 2024-04-01 PROCEDURE — 82948 REAGENT STRIP/BLOOD GLUCOSE: CPT

## 2024-04-01 PROCEDURE — 83735 ASSAY OF MAGNESIUM: CPT

## 2024-04-01 PROCEDURE — 71045 X-RAY EXAM CHEST 1 VIEW: CPT

## 2024-04-01 PROCEDURE — 99285 EMERGENCY DEPT VISIT HI MDM: CPT

## 2024-04-01 RX ORDER — ACETAMINOPHEN 325 MG/1
650 TABLET ORAL EVERY 6 HOURS PRN
Status: DISCONTINUED | OUTPATIENT
Start: 2024-04-01 | End: 2024-04-01 | Stop reason: HOSPADM

## 2024-04-01 RX ORDER — DIPHENHYDRAMINE HCL 50 MG
50 CAPSULE ORAL EVERY 6 HOURS PRN
Status: DISCONTINUED | OUTPATIENT
Start: 2024-04-01 | End: 2024-04-01 | Stop reason: HOSPADM

## 2024-04-01 RX ORDER — ONDANSETRON 2 MG/ML
4 INJECTION INTRAMUSCULAR; INTRAVENOUS EVERY 6 HOURS PRN
Status: DISCONTINUED | OUTPATIENT
Start: 2024-04-01 | End: 2024-04-01 | Stop reason: HOSPADM

## 2024-04-01 RX ORDER — FAMOTIDINE 20 MG/1
20 TABLET, FILM COATED ORAL 2 TIMES DAILY
Status: DISCONTINUED | OUTPATIENT
Start: 2024-04-01 | End: 2024-04-01 | Stop reason: HOSPADM

## 2024-04-01 RX ORDER — DOCUSATE SODIUM 100 MG/1
100 CAPSULE, LIQUID FILLED ORAL 2 TIMES DAILY
Status: DISCONTINUED | OUTPATIENT
Start: 2024-04-01 | End: 2024-04-01 | Stop reason: HOSPADM

## 2024-04-01 RX ORDER — ENOXAPARIN SODIUM 100 MG/ML
40 INJECTION SUBCUTANEOUS DAILY
Status: DISCONTINUED | OUTPATIENT
Start: 2024-04-01 | End: 2024-04-01

## 2024-04-01 RX ORDER — FUROSEMIDE 20 MG/1
20 TABLET ORAL 2 TIMES DAILY
Status: DISCONTINUED | OUTPATIENT
Start: 2024-04-01 | End: 2024-04-01

## 2024-04-01 RX ORDER — ATORVASTATIN CALCIUM 40 MG/1
80 TABLET, FILM COATED ORAL EVERY EVENING
Status: DISCONTINUED | OUTPATIENT
Start: 2024-04-01 | End: 2024-04-01 | Stop reason: HOSPADM

## 2024-04-01 RX ORDER — AMIODARONE HYDROCHLORIDE 200 MG/1
200 TABLET ORAL 3 TIMES DAILY
Status: DISCONTINUED | OUTPATIENT
Start: 2024-04-01 | End: 2024-04-01 | Stop reason: HOSPADM

## 2024-04-01 RX ORDER — ASPIRIN 81 MG/1
81 TABLET, CHEWABLE ORAL DAILY
Status: DISCONTINUED | OUTPATIENT
Start: 2024-04-01 | End: 2024-04-01 | Stop reason: HOSPADM

## 2024-04-01 RX ORDER — INSULIN LISPRO 100 [IU]/ML
1-5 INJECTION, SOLUTION INTRAVENOUS; SUBCUTANEOUS
Status: DISCONTINUED | OUTPATIENT
Start: 2024-04-01 | End: 2024-04-01 | Stop reason: HOSPADM

## 2024-04-01 RX ORDER — ALBUTEROL SULFATE 90 UG/1
2 AEROSOL, METERED RESPIRATORY (INHALATION) EVERY 6 HOURS PRN
Status: DISCONTINUED | OUTPATIENT
Start: 2024-04-01 | End: 2024-04-01 | Stop reason: HOSPADM

## 2024-04-01 RX ORDER — MECLIZINE HCL 12.5 MG/1
25 TABLET ORAL EVERY 8 HOURS PRN
Status: DISCONTINUED | OUTPATIENT
Start: 2024-04-01 | End: 2024-04-01 | Stop reason: HOSPADM

## 2024-04-01 RX ORDER — METOPROLOL SUCCINATE 25 MG/1
25 TABLET, EXTENDED RELEASE ORAL DAILY
Status: DISCONTINUED | OUTPATIENT
Start: 2024-04-01 | End: 2024-04-01 | Stop reason: HOSPADM

## 2024-04-01 RX ORDER — AMIODARONE HYDROCHLORIDE 200 MG/1
200 TABLET ORAL DAILY
Status: DISCONTINUED | OUTPATIENT
Start: 2024-04-04 | End: 2024-04-01 | Stop reason: HOSPADM

## 2024-04-01 RX ORDER — FUROSEMIDE 20 MG/1
20 TABLET ORAL DAILY
Start: 2024-04-02 | End: 2024-04-10

## 2024-04-01 RX ORDER — POLYETHYLENE GLYCOL 3350 17 G/17G
17 POWDER, FOR SOLUTION ORAL DAILY
Status: DISCONTINUED | OUTPATIENT
Start: 2024-04-01 | End: 2024-04-01 | Stop reason: HOSPADM

## 2024-04-01 RX ORDER — MAGNESIUM HYDROXIDE/ALUMINUM HYDROXICE/SIMETHICONE 120; 1200; 1200 MG/30ML; MG/30ML; MG/30ML
30 SUSPENSION ORAL EVERY 6 HOURS PRN
Status: DISCONTINUED | OUTPATIENT
Start: 2024-04-01 | End: 2024-04-01 | Stop reason: HOSPADM

## 2024-04-01 RX ORDER — CLOPIDOGREL BISULFATE 75 MG/1
75 TABLET ORAL DAILY
Status: DISCONTINUED | OUTPATIENT
Start: 2024-04-01 | End: 2024-04-01 | Stop reason: HOSPADM

## 2024-04-01 RX ORDER — GABAPENTIN 100 MG/1
100 CAPSULE ORAL 2 TIMES DAILY
Status: DISCONTINUED | OUTPATIENT
Start: 2024-04-01 | End: 2024-04-01 | Stop reason: HOSPADM

## 2024-04-01 RX ORDER — SENNOSIDES 8.6 MG
8.6 TABLET ORAL
Qty: 30 TABLET | Refills: 0 | Status: SHIPPED | OUTPATIENT
Start: 2024-04-01 | End: 2024-04-10

## 2024-04-01 RX ORDER — FUROSEMIDE 20 MG/1
20 TABLET ORAL DAILY
Status: DISCONTINUED | OUTPATIENT
Start: 2024-04-02 | End: 2024-04-01 | Stop reason: HOSPADM

## 2024-04-01 RX ADMIN — POLYETHYLENE GLYCOL 3350 17 G: 17 POWDER, FOR SOLUTION ORAL at 08:20

## 2024-04-01 RX ADMIN — APIXABAN 5 MG: 5 TABLET, FILM COATED ORAL at 08:19

## 2024-04-01 RX ADMIN — CLOPIDOGREL BISULFATE 75 MG: 75 TABLET ORAL at 08:20

## 2024-04-01 RX ADMIN — FAMOTIDINE 20 MG: 20 TABLET ORAL at 08:22

## 2024-04-01 RX ADMIN — IOHEXOL 100 ML: 350 INJECTION, SOLUTION INTRAVENOUS at 03:22

## 2024-04-01 RX ADMIN — AMIODARONE HYDROCHLORIDE 200 MG: 200 TABLET ORAL at 08:20

## 2024-04-01 RX ADMIN — GABAPENTIN 100 MG: 100 CAPSULE ORAL at 08:19

## 2024-04-01 RX ADMIN — SODIUM CHLORIDE 500 ML: 0.9 INJECTION, SOLUTION INTRAVENOUS at 06:14

## 2024-04-01 RX ADMIN — SODIUM CHLORIDE 500 ML: 0.9 INJECTION, SOLUTION INTRAVENOUS at 02:09

## 2024-04-01 RX ADMIN — ASPIRIN 81 MG CHEWABLE TABLET 81 MG: 81 TABLET CHEWABLE at 08:19

## 2024-04-01 RX ADMIN — DOCUSATE SODIUM 100 MG: 100 CAPSULE, LIQUID FILLED ORAL at 08:19

## 2024-04-01 NOTE — PLAN OF CARE
Problem: PAIN - ADULT  Goal: Verbalizes/displays adequate comfort level or baseline comfort level  Description: Interventions:  - Encourage patient to monitor pain and request assistance  - Assess pain using appropriate pain scale  - Administer analgesics based on type and severity of pain and evaluate response  - Implement non-pharmacological measures as appropriate and evaluate response  - Consider cultural and social influences on pain and pain management  - Notify physician/advanced practitioner if interventions unsuccessful or patient reports new pain  Outcome: Progressing     Problem: INFECTION - ADULT  Goal: Absence or prevention of progression during hospitalization  Description: INTERVENTIONS:  - Assess and monitor for signs and symptoms of infection  - Monitor lab/diagnostic results  - Monitor all insertion sites, i.e. indwelling lines, tubes, and drains  - Monitor endotracheal if appropriate and nasal secretions for changes in amount and color  - [place] appropriate cooling/warming therapies per order  - Administer medications as ordered  - Instruct and encourage patient and family to use good hand hygiene technique  - Identify and instruct in appropriate isolation precautions for identified infection/condition  Outcome: Progressing  Goal: Absence of fever/infection during neutropenic period  Description: INTERVENTIONS:  - Monitor WBC    Outcome: Progressing     Problem: SAFETY ADULT  Goal: Patient will remain free of falls  Description: INTERVENTIONS:  - Educate patient/family on patient safety including physical limitations  - Instruct patient to call for assistance with activity   - Consult OT/PT to assist with strengthening/mobility   - Keep Call bell within reach  - Keep bed low and locked with side rails adjusted as appropriate  - Keep care items and personal belongings within reach  - Initiate and maintain comfort rounds  - Make Fall Risk Sign visible to staff  - Apply yellow socks and bracelet  for high fall risk patients  - Consider moving patient to room near nurses station  Outcome: Progressing  Goal: Maintain or return to baseline ADL function  Description: INTERVENTIONS:  -  Assess patient's ability to carry out ADLs; assess patient's baseline for ADL function and identify physical deficits which impact ability to perform ADLs (bathing, care of mouth/teeth, toileting, grooming, dressing, etc.)  - Assess/evaluate cause of self-care deficits   - Assess range of motion  - Assess patient's mobility; develop plan if impaired  - Assess patient's need for assistive devices and provide as appropriate  - Encourage maximum independence but intervene and supervise when necessary  - Involve family in performance of ADLs  - Assess for home care needs following discharge   - Consider OT consult to assist with ADL evaluation and planning for discharge  - Provide patient education as appropriate  Outcome: Progressing  Goal: Maintains/Returns to pre admission functional level  Description: INTERVENTIONS:  - Perform AM-PAC 6 Click Basic Mobility/ Daily Activity assessment daily.  - Set and communicate daily mobility goal to care team and patient/family/caregiver.   - Collaborate with rehabilitation services on mobility goals if consulted  - Out of bed for toileting  - Record patient progress and toleration of activity level   Outcome: Progressing     Problem: DISCHARGE PLANNING  Goal: Discharge to home or other facility with appropriate resources  Description: INTERVENTIONS:  - Identify barriers to discharge w/patient and caregiver  - Arrange for needed discharge resources and transportation as appropriate  - Identify discharge learning needs (meds, wound care, etc.)  - Arrange for interpretive services to assist at discharge as needed  - Refer to Case Management Department for coordinating discharge planning if the patient needs post-hospital services based on physician/advanced practitioner order or complex needs  related to functional status, cognitive ability, or social support system  Outcome: Progressing     Problem: Knowledge Deficit  Goal: Patient/family/caregiver demonstrates understanding of disease process, treatment plan, medications, and discharge instructions  Description: Complete learning assessment and assess knowledge base.  Interventions:  - Provide teaching at level of understanding  - Provide teaching via preferred learning methods  Outcome: Progressing

## 2024-04-01 NOTE — H&P
"[facility]  H&P  Name: Vesna Langston 64 y.o. female I MRN: [id number]  Unit/Bed#: ED-03 I Date of Admission: 4/1/2024   Date of Service: 4/1/2024 I Hospital Day: 0      Assessment/Plan   * Syncope and collapse  Assessment & Plan  Patient reports being constipated despite increase of fiber intake, stool softener use and drinking 2 L of water.   States she was on the toilet when felt severe pain while passing stool, developed diaphoresis, shakiness and \"passed out.\" Likely to a vasovagal episode. Daughter called EMS when saw mom on the floor, patient states she did not hit her head. EMS helped the patient to stand up and was brought to the hospital.  In the ED CT high volume bleeding scan abdomen pelvis shows no evidence of active high-volume gastrointestinal hemorrhage, colonic diverticulosis without diverticulitis is appreciated.   CT head wo contrast ordered.  Blood work shows na of 133, leukocytosis, elevated BNP, elevated troponin, otherwise unremarkable.   EKG in the ED shows NSR with a HR of 68, ST normal  Normal saline 1000cc given in the ER  Telemetry ordered  Orthostatic BP x shifts/neuro checks   Keep trending troponin until peak  Fall precautions in place  PT for further evaluation  Cardiology consult in place    Guaiac positive stools  Assessment & Plan  Patient reports noticing some blood when wipes, likely due to constipation or hemorrhoids  Guaiac stool test positive in the ED, she is not acutely bleeding   Hgb 14.0, monitor hgb with daily CBC  Bowel regimen ordered  Education on foods high in fiber and the use of Miralax was provided at bedside in the ED    S/P ICD (internal cardiac defibrillator) procedure  Assessment & Plan  On 3/22/24 STEMI s/p NATHAN of proximal LAD-100% stenosis of proximal LAD s/p PCI intervision with NATHAN 3/22.   ICD Interrogation ordered per ED provider  Patient takes eliquis and plavix daily. She is currently taking amiodarone three times a day until 4/3 " "and will change dosages on 4/4. The patient states she has a cardiology appt on 4/4 continue with regimen   EKG show normal sinus with HR 68, no ST elevation             HFrEF (heart failure with reduced ejection fraction) (Formerly [facility])  Assessment & Plan  Wt Readings from Last 3 Encounters:   04/01/24 90 kg (198 lb 6.6 oz)   03/28/24 90.9 kg (200 lb 6.4 oz)   03/22/24 103 kg (227 lb 1.2 oz)     Per last echo on 3/23 left ventricular cavity size is dilated. Wall thickness is normal. The left ventricular ejection fraction is 35%. Systolic function is moderately reduced. Unable to assess diastolic function due to E/A fusion.     Takes furosemide 20 mg BID daily, takes Entresto, continue with regimen  No signs or symptoms of an acute exacerbation  Cardiac diet with 2L fluid restriction ordered. The patient states cardiology has her on 2L fluid restriction.   Daily weights and strict I & 0's       New onset type 2 diabetes mellitus (Formerly [facility])  Assessment & Plan  Lab Results   Component Value Date    HGBA1C 7.9 (H) 03/22/2024       No results for input(s): \"POCGLU\" in the last 72 hours.    Blood Sugar Average: Last 72 hrs:    Found to have a new onset of diabetes when  she was recently hospitalized  Does not take any anti-hyperglycemic at home  SSI in place with finger checks 4 times daily  Cardiac diet ordered     Hyperlipemia  Assessment & Plan  Takes atorvastatin 80 mg at home, continue with regimen  Last lipid panel 10 days ago             VTE Pharmacologic Prophylaxis: VTE Score: 3 Moderate Risk (Score 3-4) - Pharmacological DVT Prophylaxis Ordered: apixaban (Eliquis).& Plavix  Code Status: Level 1 - Full Code   Discussion with family:  States daughter is aware of her being in the hospital and admission,The patient states daughter will make her other family members know about event and admission.     Anticipated Length of Stay: Patient will be admitted on an observation basis with an anticipated length of stay of less " than 2 midnights secondary to syncope and collapse, rising troponin and pending cardiology consult.    Total Time Spent on Date of Encounter in care of patient: 75 mins. This time was spent on one or more of the following: performing physical exam; counseling and coordination of care; obtaining or reviewing history; documenting in the medical record; reviewing/ordering tests, medications or procedures; communicating with other healthcare professionals and discussing with patient's family/caregivers.    Chief Complaint: syncope and collapse at home.     History of Present Illness:  Vesna Langston is a 64 y.o. female with a PMH of ischemic cardiomyopathy, COVID-19, sciatica of the left side, back pain, tobacco abuse in remission, ST elevation myocardial infarction involving left anterior descending coronary artery, status post ICD placement, heart failure, hyperlipidemia, a new diagnosis of diabetes mellitus type 2 without insulin who presents after a syncopal episode and collapse at home.  Vesna Langston was recently admitted to the hospital on March 22, 2024 due to a STEMI 100% occlusion of proximal LAD status post PCI intervention on March 22, 2024.  The patient states that she went to the bathroom, she reports being constipated and having difficulty having a bowel movement.  When she sat in the toilet she felt severe pain and developed diaphoresis and shakiness when the fecal matter was passing through the rectum.  She states her daughter has a camera in the bathroom due to new pets in the home.  The patient states daughter saw her falling on the floor without hitting her head.  Daughter decided to call EMS, and EMS picked up the patient from the floor.  In the ER the patient got a CAT scan of the abdomen showing no acute findings, blood work showed hyponatremia, leukocytosis, BNP is elevated, first troponin is 125, second troponin is decreasing 105, glucose is 154, EKG in the ER shows normal sinus rhythm with no ST  elevations heart rate 68 bpm, patient is afebrile and normotensive, alert and oriented x 4, Bryan Coma Scale 15, able to follow commands and verbalize needs. A Guaciac stool was positive for occult blood.  The patient reports diaphoresis, constipation, pain when defecating, syncope and collapse without striking head.  The patient denies nausea, diarrhea, numbness or tingling, loss of vision, chest pain, bleeding from any other part of her body, dysarthria or any other symptoms than the stated above.    Review of Systems:  Review of Systems   Constitutional: Negative.    HENT: Negative.     Eyes: Negative.    Respiratory: Negative.     Cardiovascular: Negative.    Gastrointestinal:  Positive for anal bleeding, constipation, rectal pain and vomiting.   Endocrine: Negative.    Genitourinary: Negative.    Musculoskeletal: Negative.    Skin: Negative.    Allergic/Immunologic: Negative.    Neurological:  Positive for syncope.   Hematological: Negative.    Psychiatric/Behavioral: Negative.         Past Medical and Surgical History:   Past Medical History:   Diagnosis Date    Acute respiratory insufficiency 03/22/2024    Allergic     Hypoglycemia     Lactic acidosis 03/22/2024       Past Surgical History:   Procedure Laterality Date    ADENOIDECTOMY      CARDIAC CATHETERIZATION N/A 3/22/2024    Procedure: Cardiac pci;  Surgeon: Gabriel Myers MD;  Location: [facility];  Service: Cardiology    CARDIAC CATHETERIZATION N/A 3/22/2024    Procedure: Cardiac Coronary Angiogram;  Surgeon: Gabriel Myers MD;  Location: [facility];  Service: Cardiology    CARDIAC CATHETERIZATION N/A 3/22/2024    Procedure: Cardiac PCI AMI;  Surgeon: Gabriel Myers MD;  Location: [facility];  Service: Cardiology    CARDIAC CATHETERIZATION N/A 3/22/2024    Procedure: Cardiac IVUS;  Surgeon: Gabriel Myers MD;  Location: [facility];  Service: Cardiology    CARDIAC ELECTROPHYSIOLOGY PROCEDURE N/A 3/27/2024    Procedure:  Cardiac icd implant;  Surgeon: Jeffy Lama MD;  Location: [facility];  Service: Cardiology     SECTION      ECTOPIC PREGNANCY SURGERY      SEPTOPLASTY      TONSILLECTOMY         Meds/Allergies:  Prior to Admission medications    Medication Sig Start Date End Date Taking? Authorizing Provider   albuterol (PROAIR HFA) 90 mcg/act inhaler Inhale 2 puffs every 6 (six) hours as needed for wheezing 19   Brandon Baker MD   amiodarone 200 mg tablet Take 1 tablet (200 mg total) by mouth 3 (three) times a day for 7 days, THEN 1 tablet (200 mg total) daily. 3/28/24 7/2/24  Dia Swartz,    apixaban (ELIQUIS) 5 mg Take 1 tablet (5 mg total) by mouth 2 (two) times a day 3/28/24 6/26/24  Dia Swartz DO   aspirin 81 mg chewable tablet Chew 1 tablet (81 mg total) daily 3/28/24 9/24/24  William Ramirez DO   atorvastatin (LIPITOR) 80 mg tablet Take 1 tablet (80 mg total) by mouth every evening 3/28/24 6/26/24  Dia Swartz DO   clopidogrel (PLAVIX) 75 mg tablet Take 1 tablet (75 mg total) by mouth daily 3/29/24 6/27/24  Dia Swartz DO   diphenhydrAMINE (BENADRYL) 50 mg capsule Take 50 mg by mouth every 6 (six) hours as needed for itching    Historical Provider, MD   furosemide (LASIX) 20 mg tablet Take 1 tablet (20 mg total) by mouth 2 (two) times a day 3/28/24 6/26/24  Dia Swartz DO   gabapentin (Neurontin) 100 mg capsule Take 1 capsule (100 mg total) by mouth 2 (two) times a day 3/28/24   William Ramirez,    meclizine (ANTIVERT) 25 mg tablet Take 1 tablet (25 mg total) by mouth every 8 (eight) hours as needed for dizziness 21   Brandon Baker MD   metoprolol succinate (TOPROL-XL) 25 mg 24 hr tablet Take 1 tablet (25 mg total) by mouth daily 3/28/24   William Ramirez,    Multiple Vitamin (MULTIVITAMIN) capsule Take 1 capsule by mouth daily    Historical Provider, MD   nicotine (NICODERM CQ) 21 mg/24 hr TD 24 hr patch Place 1 patch on the skin over 24 hours daily 3/28/24  "6/6/24  Dia Swartz DO   ranitidine (ZANTAC) 150 MG capsule Take 150 mg by mouth as needed for indigestion or heartburn    Historical Provider, MD   sacubitril-valsartan (Entresto) 24-26 MG TABS Take 1 tablet by mouth 2 (two) times a day 3/28/24   William Ramirez DO     I have reviewed home medications with patient personally.At bedside in the ER.     Allergies:   Allergies   Allergen Reactions    Shellfish-Derived Products - Food Allergy Anaphylaxis    Azithromycin Rash       Social History:  Marital Status:    Occupation: Retired  Patient Pre-hospital Living Situation: Home  Patient Pre-hospital Level of Mobility: walks  Patient Pre-hospital Diet Restrictions: Cardiac with fluid restriction and no caffeine   Substance Use History:   Social History     Substance and Sexual Activity   Alcohol Use Yes    Comment: rarely     Social History     Tobacco Use   Smoking Status Former    Current packs/day: 1.00    Types: Cigarettes   Smokeless Tobacco Never     Social History     Substance and Sexual Activity   Drug Use Not Currently       Family History:  Family History   Problem Relation Age of Onset    Depression Mother     Heart disease Father        Physical Exam:     Vitals:   Blood Pressure: (!) 92/46 (04/01/24 0437)  Pulse: 74 (04/01/24 0437)  Temperature: 98 °F (36.7 °C) (04/01/24 0132)  Temp Source: Oral (04/01/24 0132)  Respirations: 20 (04/01/24 0437)  Height: 5' 8\" (172.7 cm) (04/01/24 0200)  Weight - Scale: 90 kg (198 lb 6.6 oz) (04/01/24 0200)  SpO2: 96 % (04/01/24 0437)    Physical Exam  Vitals and nursing note reviewed.   Constitutional:       Appearance: Normal appearance. She is obese.   HENT:      Head: Normocephalic and atraumatic.      Mouth/Throat:      Mouth: Mucous membranes are moist.      Pharynx: Oropharynx is clear.   Eyes:      Conjunctiva/sclera: Conjunctivae normal.      Pupils: Pupils are equal, round, and reactive to light.   Cardiovascular:      Rate and Rhythm: Normal rate " and regular rhythm.   Pulmonary:      Effort: Pulmonary effort is normal.      Breath sounds: Normal breath sounds.   Abdominal:      General: Bowel sounds are normal.      Palpations: Abdomen is soft.   Musculoskeletal:         General: Normal range of motion.      Cervical back: Normal range of motion.   Skin:     General: Skin is warm and dry.      Capillary Refill: Capillary refill takes less than 2 seconds.   Neurological:      General: No focal deficit present.      Mental Status: She is alert and oriented to person, place, and time. Mental status is at baseline.      GCS: GCS eye subscore is 4. GCS verbal subscore is 5. GCS motor subscore is 6.      Cranial Nerves: Cranial nerves 2-12 are intact.      Sensory: Sensation is intact.      Motor: Motor function is intact.      Coordination: Coordination is intact.   Psychiatric:         Mood and Affect: Mood normal.         Behavior: Behavior normal.         Thought Content: Thought content normal.         Judgment: Judgment normal.          Additional Data:     Lab Results:  Results from last 7 days   Lab Units 04/01/24  0213   WBC Thousand/uL 12.33*   HEMOGLOBIN g/dL 14.0   HEMATOCRIT % 42.3   PLATELETS Thousands/uL 282   NEUTROS PCT % 68   LYMPHS PCT % 20   MONOS PCT % 8   EOS PCT % 2     Results from last 7 days   Lab Units 04/01/24  0213   SODIUM mmol/L 133*   POTASSIUM mmol/L 4.6   CHLORIDE mmol/L 102   CO2 mmol/L 22   BUN mg/dL 15   CREATININE mg/dL 0.84   ANION GAP mmol/L 9   CALCIUM mg/dL 8.8   ALBUMIN g/dL 4.0   TOTAL BILIRUBIN mg/dL 0.72   ALK PHOS U/L 39   ALT U/L 32   AST U/L 42*   GLUCOSE RANDOM mg/dL 154*         Results from last 7 days   Lab Units 03/28/24  1101 03/28/24  0607 03/27/24 2056 03/27/24  1547 03/27/24  1126 03/27/24  0631 03/26/24 2025 03/26/24  1540 03/26/24  1047 03/26/24  0628 03/25/24 2055 03/25/24  1748   POC GLUCOSE mg/dl 145* 155* 191* 172* 163* 170* 193* 209* 163* 188* 179* 184*               Lines/Drains:  Invasive  Devices       Peripheral Intravenous Line  Duration             Peripheral IV 04/01/24 Left Forearm <1 day                        Imaging: I have personally reviewed all imaging pertaining this admission.  CT high volume bleeding scan abdomen pelvis   Final Result by Dangelo Gerard MD (04/01 0529)      No CT evidence of active high-volume gastrointestinal hemorrhage.      Colonic diverticulosis without diverticulitis.      Additional chronic findings and negatives as above.               Workstation performed: [id number]         XR chest 1 view portable   ED Interpretation by Eddie Contreras DO (04/01 0202)   No acute cardiopulmonary abnormalities visualized, leads from ICD appear to be in proper position          EKG and Other Studies Reviewed on Admission:   EKG: NSR. HR 68.    ** Please Note: This note has been constructed using a voice recognition system. **

## 2024-04-01 NOTE — ED ATTENDING ATTESTATION
4/1/2024  I, Pio Rangel MD, saw and evaluated the patient. I have discussed the patient with the resident/non-physician practitioner and agree with the resident's/non-physician practitioner's findings, Plan of Care, and MDM as documented in the resident's/non-physician practitioner's note, except where noted. All available labs and Radiology studies were reviewed.  I was present for key portions of any procedure(s) performed by the resident/non-physician practitioner and I was immediately available to provide assistance.       At this point I agree with the current assessment done in the Emergency Department.  I have conducted an independent evaluation of this patient a history and physical is as follows: Patient is a 64  year old female who had syncope after getting up from the toilet after trying to have a BM. Denies head strike or headache. Was dizzy and lightheaded prior to syncope. Denies feeling ICD discharge. No chest pain or sob. No fever. No N/V. Had BRBPR noted on toilet paper. Patient is on eliquis. Was last seen at [facility] ED on 3/22/24 for v. Tachy and STEMI. [email or web address] website checked on this patient and last Rx filled was on 3/28/24 for vicodin for 30 day supply. NCAT. No scleral icterus. Moist mucous membranes. Lungs clear. Heart regular without murmur. ICD site C/D/I. Abdomen soft and nontender. Good bowel sounds. No edema. No rash noted. Few ecchymoses noted on extremities. R groin dressing C/D/I. No gross focal deficits. Rectal exam as per ED resident. Differential diagnosis including but not limited to: vasovagal syncope, cardiac arrhythmia, MI, ACS, doubt PE since patient is on eliquis; metabolic abnormality; doubt intracranial process, seizure; anemia, GI bleed, dehydration. I reviewed EKG and CXR. Will check labs. Will need admission. ICD to be interrogated as well.     ED Course         Critical Care Time  Procedures

## 2024-04-01 NOTE — ASSESSMENT & PLAN NOTE
On 3/22/24 STEMI s/p NATHAN of proximal LAD-100% stenosis of proximal LAD s/p PCI intervision with NATHAN 3/22.   ICD Interrogation ordered per ED provider  Patient takes eliquis and plavix daily. She is currently taking amiodarone three times a day until 4/3 and will change dosages on 4/4. The patient states she has a cardiology appt on 4/4 continue with regimen   EKG show normal sinus with HR 68, no ST elevation

## 2024-04-01 NOTE — ASSESSMENT & PLAN NOTE
"Patient reports being constipated despite increase of fiber intake, stool softener use and drinking 2 L of water.   States she was on the toilet when felt severe pain while passing stool, developed diaphoresis, shakiness and \"passed out.\" Likely to a vasovagal episode. Daughter called EMS when saw mom on the floor, patient states she did not hit her head. EMS helped the patient to stand up and was brought to the hospital.  In the ED CT high volume bleeding scan abdomen pelvis shows no evidence of active high-volume gastrointestinal hemorrhage, colonic diverticulosis without diverticulitis is appreciated.   Blood work shows na of 133, leukocytosis, elevated BNP, elevated troponin, otherwise unremarkable.   EKG in the ED shows NSR with a HR of 68, ST normal  Normal saline 1000cc given in the ER  Telemetry ordered  Orthostatic BP x shifts/neuro checks   Keep trending troponin until peak  Fall precautions in place  PT for further evaluation  Cardiology consult in place    "

## 2024-04-01 NOTE — ASSESSMENT & PLAN NOTE
"Lab Results   Component Value Date    HGBA1C 7.9 (H) 03/22/2024       No results for input(s): \"POCGLU\" in the last 72 hours.    Blood Sugar Average: Last 72 hrs:    Found to have a new onset of diabetes when  she was recently hospitalized  Does not take any anti-hyperglycemic at home  SSI in place with finger checks 4 times daily  Cardiac diet ordered   "

## 2024-04-01 NOTE — DISCHARGE SUMMARY
Discharge Summary - [facility]    Patient Information: Vesna Langston 64 y.o. female MRN: [id number]  Unit/Bed#: -01 Encounter: [id number]    Discharging Physician / Practitioner: Arash Fabian MD  PCP: Alberta Paige DO  Admission Date: 4/1/2024  Discharge Date: 04/01/24    Reason for Admission: Syncope (Patient comes in reporting syncopal episode tonight attempting to have BM. Reports constipation x 4 days. States she felt herself passing out and lowered self to ground. Denies Headstrike. +Thinners. Recent stemi. -dizziness. +N/V and shakiness. )      Discharge Diagnoses:     Primary Discharge Diagnosis:   Principal Problem:    Syncope and collapse  Active Problems:    Hyperlipemia    New onset type 2 diabetes mellitus (HCC)    HFrEF (heart failure with reduced ejection fraction) ([facility])    S/P ICD (internal cardiac defibrillator) procedure    Guaiac positive stools  Resolved Problems:    * No resolved hospital problems. *  Consultations During Hospital Stay:  None    Procedures Performed:   none  Significant Findings:   Refer to hospital course and above listed diagnosis related plan for details    Imaging while in hospital:  CXR  CT of the abdomen high-volume bleeding scan.  Incidental Findings:   Test Results Pending at Discharge (will require follow up):   As per After Visit Summary     Outpatient Tests Requested:  Complications:  Refer to hospital course and above listed diagnosis related plan, if any    Hospital Course:    64 y.o. female with a PMH of ischemic cardiomyopathy, COVID-19, sciatica of the left side, back pain, tobacco abuse in remission, ST elevation myocardial infarction involving left anterior descending coronary artery, status post ICD placement, heart failure, hyperlipidemia, a new diagnosis of diabetes mellitus type 2 without insulin who presents after a syncopal episode and collapse at home.  Vesna Langston was recently admitted to the hospital on March 22,  2024 due to a STEMI 100% occlusion of proximal LAD status post PCI intervention on March 22, 2024.     Patient has been having some constipation and was trying to have a bowel movement which was difficult.  But while having a bowel movement she had significant pain and ended up having dizziness and lightheadedness and almost passed out.  Patient does remember holding onto something and did not hit her head or fall hard.  Patient just lowered herself down but did pass out.  Patient denies any similar episodes in the past.    Of note patient was discharged on Lasix 20 mg twice daily during her last discharge recently as per her medication reconciliation.  However the attestation from the attending comments that the patient should be on Lasix 20 mg daily.  Patient does feel that she has been feeling slightly dehydrated.  She has been watching her fluid intake but taking the Lasix 20 mg twice daily.    She mentioned that she has not had constipation problem before.    Overnight no telemetry events, troponins were stable.    This seems to be a classic vasovagal syncopal.  We will discharge the patient on Lasix 20 mg daily but to be considered only after discussing with her cardiologist who she has an appointment tomorrow.  Also patient will be offered some stool softener senna 1 tablet at bedtime.     Patient had FOBT test positive, however she also had hemorrhoids suspected on physical examination in the ED.  Patient had colonoscopy done 3 years ago and was negative according to the patient.  She has a Cologuard test coming up to be done at home which she will continue to do that and follow-up with her primary care physician.  Hemoglobin remained stable at 14 this admission, no need of further workup in-house currently.    Will discharge the patient today.  Discussed the plan of care with the patient and her daughter and are in agreement with this plan.        Please see above list of diagnoses and related plan for  "additional information.       Condition at Discharge: fair     Discharge Day Visit / Exam:     Subjective:  I have seen and examined the patient at bedside. Overnight events reviewed with the RN.     Vitals: Blood Pressure: 107/56 (04/01/24 0945)  Pulse: 70 (04/01/24 0808)  Temperature: 97.9 °F (36.6 °C) (04/01/24 0808)  Temp Source: Oral (04/01/24 0808)  Respirations: 18 (04/01/24 0808)  Height: 5' 8\" (172.7 cm) (04/01/24 0200)  Weight - Scale: 90 kg (198 lb 6.6 oz) (04/01/24 0200)  SpO2: 98 % (04/01/24 0808)  Exam:   Physical Exam  General Exam: Alert and Oriented x 3, NAD  Eyes: JUAN A  Neck: Supple.   CVS: S1, S2 Yates, RRR.   R/S: Clear to auscultate anteriorly.   Abd: Soft, NT, ND, BS+ve  Extremities: No edema noted.   Skin: No acute Rash noted.   CNS: No acute FND. Moves all 4 extremities.   Psych: Co-operative, Not agitated.       Discharge instructions/Information to patient and family:(Discharge Medications and Follow up):   See after visit summary for information provided to patient and family.      Provisions for Follow-Up Care:  See after visit summary for information related to follow-up care and any pertinent home health orders.      Disposition: Home    Planned Readmission:  No     Discharge Statement:  I spent 35 minutes discharging the patient. This time was spent on the day of discharge. I had direct contact with the patient on the day of discharge. Greater than 50% of the total time was spent examining patient, answering all patient questions, arranging and discussing plan of care with patient as well as directly providing post-discharge instructions.  Additional time then spent on discharge activities.    Discharge Medications:  See after visit summary for reconciled discharge medications provided to patient and family.      ** Please Note: \"This note has been constructed using a voice recognition system.Therefore there may be syntax, spelling, and/or grammatical errors. Please call if you have " "any questions. \"**            "

## 2024-04-01 NOTE — ASSESSMENT & PLAN NOTE
"Lab Results   Component Value Date    HGBA1C 7.9 (H) 03/22/2024       No results for input(s): \"POCGLU\" in the last 72 hours.    Blood Sugar Average: Last 72 hrs:  ?  Found to have a new onset of diabetes when  she was recently hospitalized  Does not take any anti-hyperglycemic at home  SSI in place with finger checks 4 times daily  Cardiac diet ordered   "

## 2024-04-01 NOTE — ASSESSMENT & PLAN NOTE
"Patient reports being constipated despite increase of fiber intake, stool softener use and drinking 2 L of water.   States she was on the toilet when felt severe pain while passing stool, developed diaphoresis, shakiness and \"passed out.\" Likely to a vasovagal episode. Daughter called EMS when saw mom on the floor, patient states she did not hit her head. EMS helped the patient to stand up and was brought to the hospital.  In the ED CT high volume bleeding scan abdomen pelvis shows no evidence of active high-volume gastrointestinal hemorrhage, colonic diverticulosis without diverticulitis is appreciated.   CT head wo contrast ordered.  Blood work shows na of 133, leukocytosis, elevated BNP, elevated troponin, otherwise unremarkable.   EKG in the ED shows NSR with a HR of 68, ST normal  Normal saline 1000cc given in the ER  Telemetry ordered  Orthostatic BP x shifts/neuro checks   Keep trending troponin until peak  Fall precautions in place  PT for further evaluation  Cardiology consult in place  "

## 2024-04-01 NOTE — ED PROVIDER NOTES
History  Chief Complaint   Patient presents with    Syncope     Patient comes in reporting syncopal episode tonight attempting to have BM. Reports constipation x 4 days. States she felt herself passing out and lowered self to ground. Denies Headstrike. +Thinners. Recent stemi. -dizziness. +N/V and shakiness.      Vesna is a 64 year old female with a history of recent STEMI s/p cardiac cath with stenting of LAD on 3/22, hyperlipidemia, DM2, presenting for evaluation after syncopal event while having a bowel movement.  Patient states she has been constipated over the last 4 days, she was straining to have a bowel movement when she suddenly began to feel lightheaded, she lowered herself to the ground and syncopized.  After this episode, she was very nauseous and vomited.  She denied any chest pain or shortness of breath now or during the event.  No abdominal tenderness.  She now feels as though she is at her baseline.  She did have a large bowel movement upon arrival, did insert a suppository prior to arrival.  Of note, patient does describe having some bright red blood when wiping over the last day.  No fevers.  She denies feeling any shocks from her ICD.      History provided by:  Patient and medical records   used: No        Prior to Admission Medications   Prescriptions Last Dose Informant Patient Reported? Taking?   Multiple Vitamin (MULTIVITAMIN) capsule   Yes No   Sig: Take 1 capsule by mouth daily   albuterol (PROAIR HFA) 90 mcg/act inhaler   No No   Sig: Inhale 2 puffs every 6 (six) hours as needed for wheezing   amiodarone 200 mg tablet   No No   Sig: Take 1 tablet (200 mg total) by mouth 3 (three) times a day for 7 days, THEN 1 tablet (200 mg total) daily.   apixaban (ELIQUIS) 5 mg   No No   Sig: Take 1 tablet (5 mg total) by mouth 2 (two) times a day   aspirin 81 mg chewable tablet   No No   Sig: Chew 1 tablet (81 mg total) daily   atorvastatin (LIPITOR) 80 mg tablet   No No   Sig: Take  1 tablet (80 mg total) by mouth every evening   clopidogrel (PLAVIX) 75 mg tablet   No No   Sig: Take 1 tablet (75 mg total) by mouth daily   diphenhydrAMINE (BENADRYL) 50 mg capsule   Yes No   Sig: Take 50 mg by mouth every 6 (six) hours as needed for itching   furosemide (LASIX) 20 mg tablet   No No   Sig: Take 1 tablet (20 mg total) by mouth 2 (two) times a day   gabapentin (Neurontin) 100 mg capsule   No No   Sig: Take 1 capsule (100 mg total) by mouth 2 (two) times a day   meclizine (ANTIVERT) 25 mg tablet   No No   Sig: Take 1 tablet (25 mg total) by mouth every 8 (eight) hours as needed for dizziness   metoprolol succinate (TOPROL-XL) 25 mg 24 hr tablet   No No   Sig: Take 1 tablet (25 mg total) by mouth daily   nicotine (NICODERM CQ) 21 mg/24 hr TD 24 hr patch   No No   Sig: Place 1 patch on the skin over 24 hours daily   ranitidine (ZANTAC) 150 MG capsule   Yes No   Sig: Take 150 mg by mouth as needed for indigestion or heartburn   sacubitril-valsartan (Entresto) 24-26 MG TABS   No No   Sig: Take 1 tablet by mouth 2 (two) times a day      Facility-Administered Medications: None       Past Medical History:   Diagnosis Date    Acute respiratory insufficiency 03/22/2024    Allergic     Hypoglycemia     Lactic acidosis 03/22/2024       Past Surgical History:   Procedure Laterality Date    ADENOIDECTOMY      CARDIAC CATHETERIZATION N/A 3/22/2024    Procedure: Cardiac pci;  Surgeon: Gabriel Myers MD;  Location: [facility];  Service: Cardiology    CARDIAC CATHETERIZATION N/A 3/22/2024    Procedure: Cardiac Coronary Angiogram;  Surgeon: Gabriel Myers MD;  Location: [facility];  Service: Cardiology    CARDIAC CATHETERIZATION N/A 3/22/2024    Procedure: Cardiac PCI AMI;  Surgeon: Gabriel Myers MD;  Location: [facility];  Service: Cardiology    CARDIAC CATHETERIZATION N/A 3/22/2024    Procedure: Cardiac IVUS;  Surgeon: Gabriel Myers MD;  Location: [facility];  Service: Cardiology     CARDIAC ELECTROPHYSIOLOGY PROCEDURE N/A 3/27/2024    Procedure: Cardiac icd implant;  Surgeon: Jeffy Lama MD;  Location: [facility];  Service: Cardiology     SECTION      ECTOPIC PREGNANCY SURGERY      SEPTOPLASTY      TONSILLECTOMY         Family History   Problem Relation Age of Onset    Depression Mother     Heart disease Father      I have reviewed and agree with the history as documented.    E-Cigarette/Vaping     E-Cigarette/Vaping Substances    Nicotine No     THC No     CBD No     Flavoring No     Other No     Unknown No      Social History     Tobacco Use    Smoking status: Former     Current packs/day: 1.00     Types: Cigarettes    Smokeless tobacco: Never   Substance Use Topics    Alcohol use: Yes     Comment: rarely    Drug use: Not Currently        Review of Systems   Constitutional:  Positive for diaphoresis. Negative for chills and fever.   HENT:  Negative for ear pain and sore throat.    Eyes:  Negative for pain and visual disturbance.   Respiratory:  Negative for cough and shortness of breath.    Cardiovascular:  Negative for chest pain and palpitations.   Gastrointestinal:  Positive for anal bleeding, constipation, nausea and vomiting. Negative for abdominal pain.   Genitourinary:  Negative for dysuria and hematuria.   Musculoskeletal:  Negative for arthralgias and back pain.   Skin:  Negative for color change and rash.   Neurological:  Positive for syncope. Negative for seizures.   All other systems reviewed and are negative.      Physical Exam  ED Triage Vitals [24 0132]   Temperature Pulse Respirations Blood Pressure SpO2   98 °F (36.7 °C) 73 18 107/63 96 %      Temp Source Heart Rate Source Patient Position - Orthostatic VS BP Location FiO2 (%)   Oral Monitor Sitting Right arm --      Pain Score       3             Orthostatic Vital Signs  Vitals:    24 0230 24 0245 24 0300 24 0437   BP: 107/59 107/59 103/57 (!) 92/46   Pulse: 74 78 68 74    Patient Position - Orthostatic VS: Sitting  Sitting Sitting       Physical Exam  Vitals and nursing note reviewed. Exam conducted with a chaperone present.   Constitutional:       General: She is not in acute distress.     Appearance: Normal appearance. She is not ill-appearing.   HENT:      Head: Normocephalic and atraumatic.      Mouth/Throat:      Mouth: Mucous membranes are moist.      Pharynx: Oropharynx is clear.   Eyes:      General: No scleral icterus.     Extraocular Movements: Extraocular movements intact.      Conjunctiva/sclera: Conjunctivae normal.      Pupils: Pupils are equal, round, and reactive to light.   Cardiovascular:      Rate and Rhythm: Normal rate and regular rhythm.      Heart sounds: Normal heart sounds.   Pulmonary:      Effort: Pulmonary effort is normal. No respiratory distress.      Breath sounds: Normal breath sounds. No wheezing or rales.   Abdominal:      General: Abdomen is flat. There is no distension.      Palpations: Abdomen is soft.      Tenderness: There is no abdominal tenderness. There is no guarding or rebound.   Genitourinary:     Rectum: Guaiac result positive.      Comments: Appreciated a internal hemorrhoid on examination, no external hemorrhoids appreciated, no gross blood appreciated, Hemoccult positive  Musculoskeletal:         General: No tenderness or signs of injury.      Cervical back: Neck supple. No rigidity.      Right lower leg: No edema.      Left lower leg: No edema.   Skin:     General: Skin is warm.      Coloration: Skin is not jaundiced.      Findings: No erythema or rash.   Neurological:      General: No focal deficit present.      Mental Status: She is alert and oriented to person, place, and time. Mental status is at baseline.      Cranial Nerves: No cranial nerve deficit.      Sensory: No sensory deficit.      Motor: No weakness.      Coordination: Coordination normal.   Psychiatric:         Mood and Affect: Mood normal.         Behavior: Behavior  normal.         ED Medications  Medications   sodium chloride 0.9 % bolus 500 mL (has no administration in time range)   sodium chloride 0.9 % bolus 500 mL (0 mL Intravenous Stopped 4/1/24 0236)   iohexol (OMNIPAQUE) 350 MG/ML injection (MULTI-DOSE) 100 mL (100 mL Intravenous Given 4/1/24 0322)       Diagnostic Studies  Results Reviewed       Procedure Component Value Units Date/Time    Procalcitonin [[id number]]     Lab Status: No result Specimen: Blood     Lactic acid, plasma (w/reflex if result > 2.0) [[id number]]     Lab Status: No result Specimen: Blood     Magnesium [[id number]]  (Normal) Collected: 04/01/24 0213    Lab Status: Final result Specimen: Blood from Arm, Left Updated: 04/01/24 0530     Magnesium 2.1 mg/dL     HS Troponin I 2hr [[id number]]  (Abnormal) Collected: 04/01/24 0439    Lab Status: Final result Specimen: Blood from Arm, Left Updated: 04/01/24 0513     hs TnI 2hr 105 ng/L      Delta 2hr hsTnI -20 ng/L     HS Troponin I 4hr [[id number]]     Lab Status: No result Specimen: Blood     B-Type Natriuretic Peptide(BNP) [[id number]]  (Abnormal) Collected: 04/01/24 0213    Lab Status: Final result Specimen: Blood from Arm, Left Updated: 04/01/24 0253      pg/mL     HS Troponin 0hr (reflex protocol) [[id number]]  (Abnormal) Collected: 04/01/24 0213    Lab Status: Final result Specimen: Blood from Arm, Left Updated: 04/01/24 0252     hs TnI 0hr 125 ng/L     Comprehensive metabolic panel [[id number]]  (Abnormal) Collected: 04/01/24 0213    Lab Status: Final result Specimen: Blood from Arm, Left Updated: 04/01/24 0249     Sodium 133 mmol/L      Potassium 4.6 mmol/L      Chloride 102 mmol/L      CO2 22 mmol/L      ANION GAP 9 mmol/L      BUN 15 mg/dL      Creatinine 0.84 mg/dL      Glucose 154 mg/dL      Calcium 8.8 mg/dL      AST 42 U/L      ALT 32 U/L      Alkaline Phosphatase 39 U/L      Total Protein 7.0 g/dL      Albumin 4.0 g/dL      Total Bilirubin 0.72 mg/dL      eGFR 72 ml/min/1.73sq m      Narrative:      [employer] guidelines for Chronic Kidney Disease (CKD):     Stage 1 with normal or high GFR (GFR > 90 mL/min/1.73 square meters)    Stage 2 Mild CKD (GFR = 60-89 mL/min/1.73 square meters)    Stage 3A Moderate CKD (GFR = 45-59 mL/min/1.73 square meters)    Stage 3B Moderate CKD (GFR = 30-44 mL/min/1.73 square meters)    Stage 4 Severe CKD (GFR = 15-29 mL/min/1.73 square meters)    Stage 5 End Stage CKD (GFR <15 mL/min/1.73 square meters)  Note: GFR calculation is accurate only with a steady state creatinine    CBC and differential [[id number]]  (Abnormal) Collected: 04/01/24 0213    Lab Status: Final result Specimen: Blood from Arm, Left Updated: 04/01/24 0225     WBC 12.33 Thousand/uL      RBC 4.65 Million/uL      Hemoglobin 14.0 g/dL      Hematocrit 42.3 %      MCV 91 fL      MCH 30.1 pg      MCHC 33.1 g/dL      RDW 13.8 %      MPV 10.2 fL      Platelets 282 Thousands/uL      nRBC 0 /100 WBCs      Neutrophils Relative 68 %      Immature Grans % 1 %      Lymphocytes Relative 20 %      Monocytes Relative 8 %      Eosinophils Relative 2 %      Basophils Relative 1 %      Neutrophils Absolute 8.42 Thousands/µL      Absolute Immature Grans 0.06 Thousand/uL      Absolute Lymphocytes 2.52 Thousands/µL      Absolute Monocytes 1.01 Thousand/µL      Eosinophils Absolute 0.25 Thousand/µL      Basophils Absolute 0.07 Thousands/µL                    CT high volume bleeding scan abdomen pelvis   Final Result by Dangelo Gerard MD (04/01 0529)      No CT evidence of active high-volume gastrointestinal hemorrhage.      Colonic diverticulosis without diverticulitis.      Additional chronic findings and negatives as above.               Workstation performed: [id number]         XR chest 1 view portable   ED Interpretation by Eddie Contreras DO (04/01 0202)   No acute cardiopulmonary abnormalities visualized, leads from ICD appear to be in proper position             Procedures  ECG 12 Lead Documentation Only    Date/Time: 4/1/2024 2:07 AM    Performed by: Eddie Contreras DO  Authorized by: Eddie Contreras DO    Indications / Diagnosis:  Syncope  ECG reviewed by me, the ED Provider: yes    Patient location:  ED  Previous ECG:     Previous ECG:  Compared to current    Similarity:  No change  Interpretation:     Interpretation: normal    Rate:     ECG rate:  68    ECG rate assessment: normal    Rhythm:     Rhythm: sinus rhythm    Ectopy:     Ectopy: none    QRS:     QRS axis:  Normal    QRS intervals:  Normal  Conduction:     Conduction: normal    ST segments:     ST segments:  Normal  T waves:     T waves: non-specific    Q waves:     Q waves:  V2 and V3 (Questionable elevations but is similar to prior given recent STEMI)  Other findings:     Other findings: prolonged qTc interval          ED Course  ED Course as of 04/01/24 0603   Mon Apr 01, 2024   0304 ICD was interrogated and, did not discharge   0514 HS Troponin I 2hr(!)  Elevated troponins likely residual from recent MI             HEART Risk Score      Flowsheet Row Most Recent Value   Heart Score Risk Calculator    History 1 Filed at: 04/01/2024 0537   ECG 0 Filed at: 04/01/2024 0537   Age 2 Filed at: 04/01/2024 0537   Risk Factors 2 Filed at: 04/01/2024 0537   Troponin 2 Filed at: 04/01/2024 0537   HEART Score 7 Filed at: 04/01/2024 0537                        SBIRT 20yo+      Flowsheet Row Most Recent Value   Initial Alcohol Screen: US AUDIT-C     1. How often do you have a drink containing alcohol? 1 Filed at: 04/01/2024 0132   2. How many drinks containing alcohol do you have on a typical day you are drinking?  1 Filed at: 04/01/2024 0132   3a. Male UNDER 65: How often do you have five or more drinks on one occasion? 0 Filed at: 04/01/2024 0132   3b. FEMALE Any Age, or MALE 65+: How often do you have 4 or more drinks on one occassion? 0 Filed at: 04/01/2024 0132   Audit-C Score 2 Filed at:  04/01/2024 0132   CHRIS: How many times in the past year have you...    Used an illegal drug or used a prescription medication for non-medical reasons? Never Filed at: 04/01/2024 0132                  Medical Decision Making  Vesna is a 64 year old female with a history of recent STEMI s/p cardiac cath with stenting of LAD on 3/22, hyperlipidemia, DM2, presenting for evaluation after syncopal event while having a bowel movement.  He also notes she has recently been constipated and has had bright red blood per rectum when wiping.    On arrival, patient was well-appearing and not in any acute distress, vital signs were unremarkable.  Heart and lung sounds were normal.  She does not have any abdominal tenderness.  Hemoccult was positive, did not appreciate what felt like an internal hemorrhoid on examination.  Patient did have a large bowel movement here in the ED with some relief in her constipation.    Suspect vasovagal event, but in setting of recent STEMI, concern for new MI.  Suspect bleeding is from constipation, hemorrhoids, low suspicion for diverticulitis or colitis.    Patient had leukocytosis, initial troponin was 125, 2-hour troponin was 105, likely residual elevation from prior MI, ECG did not show any acute ischemia.  CT scan of the abdomen pelvis did not show any acute abnormalities.  She remained well-appearing throughout her stay in the ED.    Plan to admit for cardiac monitoring given syncope in setting of recent MI.  Patient was agreeable to this plan.    Problems Addressed:  Bright red blood per rectum: acute illness or injury  Constipation: acute illness or injury  History of ST elevation myocardial infarction (STEMI): self-limited or minor problem  Syncope: acute illness or injury    Amount and/or Complexity of Data Reviewed  Labs: ordered. Decision-making details documented in ED Course.  Radiology: ordered and independent interpretation performed.    Risk  Prescription drug management.  Decision  regarding hospitalization.          Disposition  Final diagnoses:   Syncope   Bright red blood per rectum   Constipation   History of ST elevation myocardial infarction (STEMI)     Time reflects when diagnosis was documented in both MDM as applicable and the Disposition within this note       Time User Action Codes Description Comment    4/1/2024  5:37 AM Eddie Contreras [R55] Syncope     4/1/2024  5:38 AM Eddie Contreras Add [K62.5] Bright red blood per rectum     4/1/2024  5:38 AM Eddie Contreras Add [K59.00] Constipation     4/1/2024  5:38 AM Eddie Contreras Add [I25.2] History of ST elevation myocardial infarction (STEMI)           ED Disposition       ED Disposition   Admit    Condition   Stable    Date/Time   Mon Apr 1, 2024 0537    Comment   Case was discussed with Cadence Meier and the patient's admission status was agreed to be Admission Status: observation status to the service of Dr. Fabian.               Follow-up Information    None         Patient's Medications   Discharge Prescriptions    No medications on file     No discharge procedures on file.    PDMP Review         Value Time User    PDMP Reviewed  Yes 4/1/2024  1:34 AM Pio Rangel MD             ED Provider  Attending physically available and evaluated Vesna Langston. I managed the patient along with the ED Attending.    Electronically Signed by           Eddie Contreras DO  04/01/24 0603

## 2024-04-01 NOTE — ASSESSMENT & PLAN NOTE
Patient reports noticing some blood when wipes, likely due to constipation or hemorrhoids  Guaiac stool test positive in the ED, she is not acutely bleeding   Hgb 14.0, monitor hgb with daily CBC  Bowel regimen ordered  Education on foods high in fiber and the use of Miralax was provided at bedside in the ED

## 2024-04-01 NOTE — ASSESSMENT & PLAN NOTE
Wt Readings from Last 3 Encounters:   04/01/24 90 kg (198 lb 6.6 oz)   03/28/24 90.9 kg (200 lb 6.4 oz)   03/22/24 103 kg (227 lb 1.2 oz)     Per last echo on 3/23 left ventricular cavity size is dilated. Wall thickness is normal. The left ventricular ejection fraction is 35%. Systolic function is moderately reduced. Unable to assess diastolic function due to E/A fusion.     Takes furosemide 20 mg BID daily, takes Entresto, continue with regimen  No signs or symptoms of an acute exacerbation  Cardiac diet with 2L fluid restriction ordered. The patient states cardiology has her on 2L fluid restriction.   Daily weights and strict I & 0's

## 2024-04-02 ENCOUNTER — OFFICE VISIT (OUTPATIENT)
Dept: CARDIOLOGY CLINIC | Facility: CLINIC | Age: 64
End: 2024-04-02

## 2024-04-02 VITALS
HEART RATE: 74 BPM | SYSTOLIC BLOOD PRESSURE: 98 MMHG | OXYGEN SATURATION: 98 % | WEIGHT: 208.7 LBS | BODY MASS INDEX: 31.63 KG/M2 | HEIGHT: 68 IN | DIASTOLIC BLOOD PRESSURE: 58 MMHG

## 2024-04-02 DIAGNOSIS — Z95.5 S/P CORONARY ARTERY STENT PLACEMENT: Chronic | ICD-10-CM

## 2024-04-02 DIAGNOSIS — I50.20 HFREF (HEART FAILURE WITH REDUCED EJECTION FRACTION) (HCC): ICD-10-CM

## 2024-04-02 DIAGNOSIS — I50.22 CHRONIC HFREF (HEART FAILURE WITH REDUCED EJECTION FRACTION) (HCC): ICD-10-CM

## 2024-04-02 DIAGNOSIS — I25.10 CORONARY ARTERY DISEASE INVOLVING NATIVE CORONARY ARTERY OF NATIVE HEART WITHOUT ANGINA PECTORIS: Chronic | ICD-10-CM

## 2024-04-02 DIAGNOSIS — Z86.79 HISTORY OF SUSTAINED VENTRICULAR TACHYCARDIA: Chronic | ICD-10-CM

## 2024-04-02 DIAGNOSIS — I48.92 ATRIAL FLUTTER, PAROXYSMAL (HCC): Chronic | ICD-10-CM

## 2024-04-02 DIAGNOSIS — I21.02 ST ELEVATION MYOCARDIAL INFARCTION INVOLVING LEFT ANTERIOR DESCENDING (LAD) CORONARY ARTERY (HCC): ICD-10-CM

## 2024-04-02 DIAGNOSIS — Z09 HOSPITAL DISCHARGE FOLLOW-UP: Primary | ICD-10-CM

## 2024-04-02 PROCEDURE — 99024 POSTOP FOLLOW-UP VISIT: CPT | Performed by: PHYSICIAN ASSISTANT

## 2024-04-02 RX ORDER — MAGNESIUM CARB/ALUMINUM HYDROX 105-160MG
30 TABLET,CHEWABLE ORAL
COMMUNITY
End: 2024-04-10

## 2024-04-02 RX ORDER — CYCLOBENZAPRINE HYDROCHLORIDE 15 MG/1
15 CAPSULE, EXTENDED RELEASE ORAL DAILY PRN
COMMUNITY
End: 2024-04-10

## 2024-04-02 RX ORDER — SENNOSIDES 8.6 MG
650 CAPSULE ORAL EVERY 8 HOURS PRN
COMMUNITY

## 2024-04-02 RX ORDER — METHOCARBAMOL 750 MG/1
750 TABLET, FILM COATED ORAL 4 TIMES DAILY PRN
COMMUNITY
End: 2024-04-10

## 2024-04-02 RX ORDER — METOPROLOL SUCCINATE 25 MG/1
25 TABLET, EXTENDED RELEASE ORAL DAILY
Qty: 90 TABLET | Refills: 1 | Status: SHIPPED | OUTPATIENT
Start: 2024-04-02 | End: 2024-04-10

## 2024-04-02 RX ORDER — FLUTICASONE PROPIONATE 50 MCG
2 SPRAY, SUSPENSION (ML) NASAL DAILY
COMMUNITY
End: 2024-04-10

## 2024-04-02 NOTE — LETTER
April 2, 2024     Patient: Vesna Langston  YOB: 1960  Date of Visit: 4/2/2024      To Whom it May Concern:    Vesna Langston is under my professional care. Vesna was seen in my office on 4/2/2024. Vesna was admitted to hospital from 03/22 and 03/28/2024. She is not fit to return to work from cardiac standpoint at this time. We will continue to see her in our office consistently to reassess fitness to return to work.    If you have any questions or concerns, please don't hesitate to call.         Sincerely,          Eunice Owen PA-C      CC: No Recipients

## 2024-04-02 NOTE — PROGRESS NOTES
"General Cardiology Outpatient Progress Note    Vesna Langston 64 y.o. female   MRN: [id number]  Encounter: [id number]    Assessment:  Patient Active Problem List    Diagnosis Date Noted    Syncope and collapse 04/01/2024    Guaiac positive stools 04/01/2024    S/P ICD (internal cardiac defibrillator) procedure 03/27/2024    Chronic low back pain 03/25/2024    HFrEF (heart failure with reduced ejection fraction) ([facility]) 03/25/2024    Ischemic cardiomyopathy 03/24/2024    Coronary artery disease involving native coronary artery of native heart 03/23/2024    S/P coronary artery stent placement 03/23/2024    ST elevation myocardial infarction involving left anterior descending (LAD) coronary artery ([facility]) 03/22/2024    Atrial flutter, paroxysmal ([facility]) 03/22/2024    Hyperlipemia 03/22/2024    New onset type 2 diabetes mellitus ([facility]) 03/22/2024    Tobacco abuse 03/22/2024    History of sustained ventricular tachycardia 03/22/2024    Symptoms of upper respiratory infection (URI) 03/04/2023    Bronchitis 11/28/2022    Back pain 07/31/2022    Sciatica of left side 07/31/2022       Today's Plan:  Resume Lasix at 20 mg daily.   Will hold off on addition of SGLT2i given likely hypoglycemic events since discharge.  Repeat BMP before next visit. May be able to add on MRA pending results.  Provided patient with \"Living With Heart Failure\" booklet and \"Don't Pass the Salt\" cookbook.  Provided work note to patient.   Refill sent to pharmacy as requested.     Plan:  Newly diagnosed HFrEF; LVEF 35%; LVIDd 4.7 cm   [facility] 03/22/2024: received PCI to 100% stenosis of ostial/proximal LAD.   TTE 03/23/2024: LVEF 35%. LVIDd 4.7 cm. RWMA. Normal RV. Moderate MR. Mild TR. Dilated IVC.    cMRI 03/26/2024: final report pending.    Weight of 200 lbs on 03/28. Today, weighs 208 lbs.    Most recent BMP from 04/01/2024: sodium 133; potassium 4.6 (mod hemo); BUN 15; creatinine 0.84; eGFR 72.     Pharmacotherapies / Neurohormonal Blockade:  --Beta Blocker: " metoprolol succinate 25 mg daily.   --ARNi / ACEi / ARB: Entresto 24-26 mg BID.   --Aldosterone Antagonist: No.   --SGLT2 Inhibitor: No.   --Diuretic: Lasix 20 mg daily.     Sudden Cardiac Death Risk Reduction:  --[employer]tronic dual chamber ICD in situ since 03/2024 (secondary prevention).   --No outpatient interrogations since implant.     Electrical Resynchronization:  --Candidacy for BiV device: narrow QRS.     Advanced Therapies: Will continue to monitor.    Coronary artery disease   Without active chest pain.   S/p STEMI in 03/2024; received PCI to 100% stenosis of ostial/proximal LAD.   Continue on aspirin, Plavix, statin, and BB as above.    History of monomorphic ventricular tachycardia   Per EP notes, felt to be scar mediated.    S/p secondary prevention ICD.    Continues on amiodarone per EP. BB as above.     Atrial flutter, paroxysmal    [id number] = 5 (age, sex, HF, CAD, DM).   Anticoagulation on Eliquis.    Rate control: BB as above.   Rhythm control: amiodarone per EP.    Hyperlipidemia  Diabetes mellitus, type II  Chronic back pain  History of tobacco abuse    HPI:   Vesna Langston is a 64-year-old woman with a PMH as above who presents to the office for hospital follow-up and to establish with outpatient practice.    Presented to [facility] on 03/22/2024 via EMS s/p 3 reported syncopal events at home. EKG with rapid atrial flutter with ST elevations. STEMI alert called. Received adenosine and converted to NSR. Later went into sustained monomorphic VT, and was cardioverted in ED with shock x1. Transferred to [facility] and underwent C during which she received received PCI to 100% stenosis of ostial/proximal LAD. Started on DAPT and AC (1 week triple therapy recommended per EP). TTE with new LVEF 35%. EP team consulted and implanted dual chamber ICD for secondary prevention, and started AADs. IV Lasix stopped on 03/25; transitioned to PO Lasix on day of discharge. Lost 5 lbs this  "admission.     Admitted to [facility] on 04/02/2024 after presenting s/p syncope at home while trying to have BM. Received 1L IV fluids in ED. ICD was interrogated. Per notes, \"seems to be a classic vasovagal syncop[e].\" Lasix dose decreased to 20 mg daily.     04/02/2024: Patient presents with her son for hospital follow-up. Reviewed hospital course and reviewed medications in detail. Feeling well today. Denies LH, dizziness, CP, SOB, LE swelling, PND and orthopnea. Is completing daily weights; 204-205 lbs on home scale since discharge. Currently living with her daughter, Laura, in [place] (who, per patient, has OCD). Laura has been extremely rigid in monitoring patient's carb and sodium intake (some days with essentially 0 carb intake). Patient reports feeling shaky on one of those days with low carb intake (improved after eating low carb cookie). Provided patient with \"Living With Heart Failure\" booklet and \"Don't Pass the Salt\" cookbook.    Past Medical History:   Diagnosis Date    Acute respiratory insufficiency 03/22/2024    Allergic     Chronic HFrEF (heart failure with reduced ejection fraction) ([facility])     Coronary artery disease     COVID-19 virus infection 11/28/2022    Diabetes mellitus ([facility])     Hyperlipidemia     Hypoglycemia     ICD (implantable cardioverter-defibrillator) in place     Ischemic cardiomyopathy     Lactic acidosis 03/22/2024    Tobacco abuse        Review of Systems   Constitutional:  Positive for fatigue. Negative for activity change, appetite change and unexpected weight change.   Respiratory:  Negative for cough, chest tightness and shortness of breath.    Cardiovascular:  Negative for chest pain, palpitations and leg swelling.   Gastrointestinal:  Negative for abdominal distention, abdominal pain, diarrhea and nausea.   Genitourinary:  Negative for decreased urine volume, dysuria and urgency.   Musculoskeletal: Negative.    Skin: Negative.    Neurological:  Negative for dizziness, " syncope, weakness and light-headedness.   Psychiatric/Behavioral:  Negative for confusion and sleep disturbance. The patient is not nervous/anxious.        Allergies   Allergen Reactions    Shellfish-Derived Products - Food Allergy Anaphylaxis    Azithromycin Rash         Current Outpatient Medications:     acetaminophen (TYLENOL) 650 mg CR tablet, Take 650 mg by mouth every 8 (eight) hours as needed, Disp: , Rfl:     albuterol (PROAIR HFA) 90 mcg/act inhaler, Inhale 2 puffs every 6 (six) hours as needed for wheezing, Disp: 8.5 g, Rfl: 0    amiodarone 200 mg tablet, Take 1 tablet (200 mg total) by mouth 3 (three) times a day for 7 days, THEN 1 tablet (200 mg total) daily., Disp: 111 tablet, Rfl: 0    apixaban (ELIQUIS) 5 mg, Take 1 tablet (5 mg total) by mouth 2 (two) times a day, Disp: 60 tablet, Rfl: 2    aspirin 81 mg chewable tablet, Chew 1 tablet (81 mg total) daily, Disp: 30 tablet, Rfl: 5    atorvastatin (LIPITOR) 80 mg tablet, Take 1 tablet (80 mg total) by mouth every evening, Disp: 30 tablet, Rfl: 2    clopidogrel (PLAVIX) 75 mg tablet, Take 1 tablet (75 mg total) by mouth daily, Disp: 30 tablet, Rfl: 2    diphenhydrAMINE (BENADRYL) 50 mg capsule, Take 50 mg by mouth every 6 (six) hours as needed for itching, Disp: , Rfl:     docusate sodium (COLACE) 50 mg capsule, Take 50 mg by mouth daily, Disp: , Rfl:     fluticasone (FLONASE) 50 mcg/act nasal spray, 2 sprays into each nostril daily, Disp: , Rfl:     furosemide (LASIX) 20 mg tablet, Take 1 tablet (20 mg total) by mouth daily Do not start before April 2, 2024., Disp: , Rfl:     gabapentin (Neurontin) 100 mg capsule, Take 1 capsule (100 mg total) by mouth 2 (two) times a day, Disp: , Rfl:     meclizine (ANTIVERT) 25 mg tablet, Take 1 tablet (25 mg total) by mouth every 8 (eight) hours as needed for dizziness, Disp: 30 tablet, Rfl: 0    metoprolol succinate (TOPROL-XL) 25 mg 24 hr tablet, Take 1 tablet (25 mg total) by mouth daily, Disp: 90 tablet, Rfl:  1    mineral oil liquid, 30 mL Used in belly button, Disp: , Rfl:     ranitidine (ZANTAC) 150 MG capsule, Take 150 mg by mouth daily, Disp: , Rfl:     sacubitril-valsartan (Entresto) 24-26 MG TABS, Take 1 tablet by mouth 2 (two) times a day, Disp: 60 tablet, Rfl: 3    senna (SENOKOT) 8.6 mg, Take 1 tablet (8.6 mg total) by mouth daily at bedtime, Disp: 30 tablet, Rfl: 0    cyclobenzaprine (AMRIX) 15 MG 24 hr capsule, Take 15 mg by mouth daily as needed (Patient not taking: Reported on 4/2/2024), Disp: , Rfl:     Diclofenac Sodium (VOLTAREN) 1 %, APPLY 2 GRAMS TO THE AFFECTED AREA 4 TIMES A DAY. (2 finger tip units = 1 gram) (Patient not taking: Reported on 4/2/2024), Disp: , Rfl:     methocarbamol (ROBAXIN) 750 mg tablet, Take 750 mg by mouth 4 (four) times a day as needed (Patient not taking: Reported on 4/2/2024), Disp: , Rfl:     Multiple Vitamin (MULTIVITAMIN) capsule, Take 1 capsule by mouth daily (Patient not taking: Reported on 4/2/2024), Disp: , Rfl:     nicotine (NICODERM CQ) 21 mg/24 hr TD 24 hr patch, Place 1 patch on the skin over 24 hours daily (Patient not taking: Reported on 4/2/2024), Disp: 70 patch, Rfl: 0  No current facility-administered medications for this visit.    Social History     Socioeconomic History    Marital status:      Spouse name: Not on file    Number of children: 3    Years of education: Not on file    Highest education level: Not on file   Occupational History    Not on file   Tobacco Use    Smoking status: Former     Current packs/day: 1.00     Types: Cigarettes    Smokeless tobacco: Never    Tobacco comments:     Smoked 0.5-1 ppd; quit in 03/2024.    Vaping Use    Vaping status: Not on file   Substance and Sexual Activity    Alcohol use: Yes     Comment: rarely    Drug use: Not Currently    Sexual activity: Not on file   Other Topics Concern    Not on file   Social History Narrative    Not on file     Social Determinants of Health     Financial Resource Strain: Low Risk   (2/20/2023)    Received from [facility]    Overall Financial Resource Strain (CARDIA)     Difficulty of Paying Living Expenses: Not hard at all   Food Insecurity: No Food Insecurity (2/20/2023)    Received from [facility]    Hunger Vital Sign     Worried About Running Out of Food in the Last Year: Never true     Ran Out of Food in the Last Year: Never true   Transportation Needs: No Transportation Needs (2/20/2023)    Received from [facility]    PRAPARE - Transportation     Lack of Transportation (Medical): No     Lack of Transportation (Non-Medical): No   Physical Activity: Not on file   Stress: No Stress Concern Present (2/20/2023)    Received from [facility]    [employer] - Occupational Stress Questionnaire     Feeling of Stress : Not at all   Social Connections: Moderately Isolated (2/20/2023)    Received from [facility]    Social Connection and Isolation Panel [NHANES]     Frequency of Communication with Friends and Family: More than three times a week     Frequency of Social Gatherings with Friends and Family: Once a week     Attends Gnosticist Services: More than 4 times per year     Active Member of Clubs or Organizations: No     Attends Club or Organization Meetings: Never     Marital Status:    Intimate Partner Violence: Not At Risk (2/20/2023)    Received from [facility]    Humiliation, Afraid, Rape, and Kick questionnaire     Fear of Current or Ex-Partner: No     Emotionally Abused: No     Physically Abused: No     Sexually Abused: No   Housing Stability: Low Risk  (2/20/2023)    Received from [facility]    Housing Stability Vital Sign     Unable to Pay for Housing in the Last Year: No     Number of Places Lived in the Last Year: 1     Unstable Housing in the Last Year: No     Family History   Problem Relation Age of Onset    Depression Mother   "   Heart disease Father     OCD Daughter        Vitals:   Blood pressure 98/58, pulse 74, height 5' 8\" (1.727 m), weight 94.7 kg (208 lb 11.2 oz), SpO2 98%.    Wt Readings from Last 10 Encounters:   24 94.7 kg (208 lb 11.2 oz)   24 90 kg (198 lb 6.6 oz)   24 90.9 kg (200 lb 6.4 oz)   24 103 kg (227 lb 1.2 oz)   22 83.9 kg (185 lb)   22 86.2 kg (190 lb)   21 86.2 kg (190 lb)   19 88.9 kg (196 lb)   06/10/19 89.4 kg (197 lb)     Vitals:    24 0955   BP: 98/58   BP Location: Left arm   Patient Position: Sitting   Cuff Size: Large   Pulse: 74   SpO2: 98%   Weight: 94.7 kg (208 lb 11.2 oz)   Height: 5' 8\" (1.727 m)       Physical Exam  Vitals reviewed.   Constitutional:       General: She is awake. She is not in acute distress.     Appearance: Normal appearance. She is well-developed and overweight. She is not toxic-appearing or diaphoretic.      Comments: Ambulating with cane    HENT:      Head: Normocephalic.      Nose: Nose normal.      Mouth/Throat:      Mouth: Mucous membranes are moist.   Eyes:      General: No scleral icterus.     Conjunctiva/sclera: Conjunctivae normal.   Neck:      Vascular: No JVD.      Trachea: No tracheal deviation.   Cardiovascular:      Rate and Rhythm: Normal rate and regular rhythm.      Heart sounds: Murmur heard.   Pulmonary:      Effort: Pulmonary effort is normal. No bradypnea or respiratory distress.      Breath sounds: Normal air entry. No decreased breath sounds or wheezing.   Abdominal:      General: There is no distension.      Palpations: Abdomen is soft.      Tenderness: There is no abdominal tenderness.   Musculoskeletal:      Cervical back: Neck supple.      Right lower le+ Pitting Edema present.      Left lower le+ Pitting Edema present.   Skin:     General: Skin is warm and dry.      Coloration: Skin is not jaundiced or pale.   Neurological:      General: No focal deficit present.      Mental Status: She is alert " "and oriented to person, place, and time.   Psychiatric:         Attention and Perception: Attention normal.         Mood and Affect: Affect normal.         Speech: Speech normal.         Behavior: Behavior normal. Behavior is cooperative.         Thought Content: Thought content normal.       Labs & Results:  Lab Results   Component Value Date    WBC 12.33 (H) 04/01/2024    HGB 14.0 04/01/2024    HCT 42.3 04/01/2024    MCV 91 04/01/2024     04/01/2024     Lab Results   Component Value Date    SODIUM 133 (L) 04/01/2024    K 4.6 04/01/2024     04/01/2024    CO2 22 04/01/2024    BUN 15 04/01/2024    CREATININE 0.84 04/01/2024    GLUC 154 (H) 04/01/2024    CALCIUM 8.8 04/01/2024     Lab Results   Component Value Date    INR 1.0 07/31/2022     No results found for: \"NTBNP\"     Lab Results   Component Value Date     (H) 04/01/2024        I have spent a total time of 90 minutes on 04/02/24 in caring for this patient including Diagnostic results, Prognosis, Instructions for management, Patient and family education, Counseling / Coordination of care, Documenting in the medical record, and Reviewing / ordering tests, medicine, procedures  .    Eunice Owen PA-C  "

## 2024-04-04 ENCOUNTER — APPOINTMENT (OUTPATIENT)
Dept: LAB | Facility: CLINIC | Age: 64
End: 2024-04-04
Payer: COMMERCIAL

## 2024-04-04 DIAGNOSIS — I21.02 ST ELEVATION MYOCARDIAL INFARCTION INVOLVING LEFT ANTERIOR DESCENDING (LAD) CORONARY ARTERY (HCC): ICD-10-CM

## 2024-04-04 DIAGNOSIS — I21.3 STEMI (ST ELEVATION MYOCARDIAL INFARCTION) (HCC): ICD-10-CM

## 2024-04-04 LAB
ANION GAP SERPL CALCULATED.3IONS-SCNC: 7 MMOL/L (ref 4–13)
BUN SERPL-MCNC: 16 MG/DL (ref 5–25)
CALCIUM SERPL-MCNC: 9.1 MG/DL (ref 8.4–10.2)
CHLORIDE SERPL-SCNC: 105 MMOL/L (ref 96–108)
CO2 SERPL-SCNC: 25 MMOL/L (ref 21–32)
CREAT SERPL-MCNC: 0.75 MG/DL (ref 0.6–1.3)
GFR SERPL CREATININE-BSD FRML MDRD: 83 ML/MIN/1.73SQ M
GLUCOSE P FAST SERPL-MCNC: 129 MG/DL (ref 65–99)
POTASSIUM SERPL-SCNC: 4.6 MMOL/L (ref 3.5–5.3)
SODIUM SERPL-SCNC: 137 MMOL/L (ref 135–147)

## 2024-04-04 PROCEDURE — 36415 COLL VENOUS BLD VENIPUNCTURE: CPT

## 2024-04-04 PROCEDURE — 80048 BASIC METABOLIC PNL TOTAL CA: CPT

## 2024-04-06 ENCOUNTER — HOSPITAL ENCOUNTER (INPATIENT)
Facility: HOSPITAL | Age: 64
LOS: 3 days | Discharge: HOME | DRG: 377 | End: 2024-04-10
Attending: SURGERY | Admitting: STUDENT IN AN ORGANIZED HEALTH CARE EDUCATION/TRAINING PROGRAM
Payer: COMMERCIAL

## 2024-04-06 DIAGNOSIS — K92.2 UPPER GI BLEED: ICD-10-CM

## 2024-04-06 DIAGNOSIS — R79.89 ELEVATED TSH: ICD-10-CM

## 2024-04-06 DIAGNOSIS — R55 SYNCOPE AND COLLAPSE: Primary | ICD-10-CM

## 2024-04-06 DIAGNOSIS — K92.0 COFFEE GROUND EMESIS: ICD-10-CM

## 2024-04-06 NOTE — LETTER
[facility] access hospital [facility] FLO[facility] SURG UN[facility]  [address]  [place] [address]  Dept: [phone]    April 10, 2024     Patient: Vesna Langston   YOB: 1960   Date of Visit: 4/6/2024       To Whom it May Concern:    Vesna Langston is under my professional care. She was seen in the hospital from 4/6/2024 to 04/10/24. She may return to work     If you have any questions or concerns, please don't hesitate to call.         Sincerely,                Ana Lilia Dueñas PA-C

## 2024-04-07 ENCOUNTER — APPOINTMENT (OUTPATIENT)
Dept: PERIOP | Facility: HOSPITAL | Age: 64
DRG: 377 | End: 2024-04-07
Payer: COMMERCIAL

## 2024-04-07 ENCOUNTER — APPOINTMENT (INPATIENT)
Dept: CT IMAGING | Facility: HOSPITAL | Age: 64
DRG: 377 | End: 2024-04-07
Payer: COMMERCIAL

## 2024-04-07 ENCOUNTER — ANESTHESIA (INPATIENT)
Dept: PERIOP | Facility: HOSPITAL | Age: 64
DRG: 377 | End: 2024-04-07
Payer: COMMERCIAL

## 2024-04-07 ENCOUNTER — APPOINTMENT (EMERGENCY)
Dept: CT IMAGING | Facility: HOSPITAL | Age: 64
DRG: 377 | End: 2024-04-07
Payer: COMMERCIAL

## 2024-04-07 ENCOUNTER — APPOINTMENT (EMERGENCY)
Dept: RADIOLOGY | Facility: HOSPITAL | Age: 64
DRG: 377 | End: 2024-04-07
Payer: COMMERCIAL

## 2024-04-07 ENCOUNTER — ANESTHESIA EVENT (INPATIENT)
Dept: PERIOP | Facility: HOSPITAL | Age: 64
DRG: 377 | End: 2024-04-07
Payer: COMMERCIAL

## 2024-04-07 PROBLEM — K92.0 COFFEE GROUND EMESIS: Status: ACTIVE | Noted: 2024-04-07

## 2024-04-07 PROBLEM — E11.9 TYPE 2 DIABETES MELLITUS, WITHOUT LONG-TERM CURRENT USE OF INSULIN (HCC): Status: ACTIVE | Noted: 2024-04-07

## 2024-04-07 LAB
2HR DELTA HS TROPONIN: 2 NG/L
AAASAAGGREGATION: 5.8 % (ref 89–100)
AAASAINHIBITION: 94.2 % (ref 0–11)
AAMA (MAX AMPLITUDE AA): 21.6 MM (ref 51–71)
ABO GROUP BLD: NORMAL
ABO GROUP BLD: NORMAL
ACTFMA(MAX AMPLITUDE ACTF): 18.7 MM (ref 2–19)
ADPADPAGGREGATION: 74.3 % (ref 83–100)
ADPADPINHIBITION: 25.7 % (ref 0–17)
ADPMA(MAX AMPLITUDE ADP): 56 MM (ref 45–69)
ALBUMIN SERPL BCP-MCNC: 3.4 G/DL (ref 3.5–5)
ALBUMIN SERPL BCP-MCNC: 3.6 G/DL (ref 3.5–5)
ALP SERPL-CCNC: 30 U/L (ref 34–104)
ALP SERPL-CCNC: 31 U/L (ref 34–104)
ALT SERPL W P-5'-P-CCNC: 24 U/L (ref 7–52)
ALT SERPL W P-5'-P-CCNC: 27 U/L (ref 7–52)
ANION GAP SERPL CALCULATED.3IONS-SCNC: 10 MMOL/L (ref 4–13)
ANION GAP SERPL CALCULATED.3IONS-SCNC: 8 MMOL/L (ref 4–13)
APTT PPP: 151 SECONDS (ref 23–37)
APTT PPP: 83 SECONDS (ref 23–37)
AST SERPL W P-5'-P-CCNC: 19 U/L (ref 13–39)
AST SERPL W P-5'-P-CCNC: 30 U/L (ref 13–39)
BASE EXCESS BLDA CALC-SCNC: 8 MMOL/L (ref -2–3)
BASOPHILS # BLD AUTO: 0.08 THOUSANDS/ÂΜL (ref 0–0.1)
BASOPHILS NFR BLD AUTO: 1 % (ref 0–1)
BILIRUB SERPL-MCNC: 0.46 MG/DL (ref 0.2–1)
BILIRUB SERPL-MCNC: 0.5 MG/DL (ref 0.2–1)
BLD GP AB SCN SERPL QL: NEGATIVE
BUN SERPL-MCNC: 38 MG/DL (ref 5–25)
BUN SERPL-MCNC: 40 MG/DL (ref 5–25)
CA-I BLD-SCNC: 1.14 MMOL/L (ref 1.12–1.32)
CALCIUM ALBUM COR SERPL-MCNC: 9 MG/DL (ref 8.3–10.1)
CALCIUM SERPL-MCNC: 8.5 MG/DL (ref 8.4–10.2)
CALCIUM SERPL-MCNC: 8.7 MG/DL (ref 8.4–10.2)
CARDIAC TROPONIN I PNL SERPL HS: 14 NG/L
CARDIAC TROPONIN I PNL SERPL HS: 16 NG/L
CFFFLEV: 542 MG/DL (ref 278–581)
CFFMA (FUNCTIONAL FIBRINOGEN MAX AMPLITUDE): 29.7 MM (ref 15–32)
CHLORIDE SERPL-SCNC: 101 MMOL/L (ref 96–108)
CHLORIDE SERPL-SCNC: 105 MMOL/L (ref 96–108)
CKA(ANGLE): 78.3 DEG (ref 63–78)
CKHR(HEPARINASE REACTION TIME): 2.2 MIN (ref 4.3–8.3)
CKK(CLOT KINETICS): 0.9 MIN (ref 0.8–2.1)
CKMA(MAX AMPLITUDE): 69 MM (ref 52–69)
CKR(REACTION TIME): 2.1 MIN (ref 4.6–9.1)
CO2 SERPL-SCNC: 25 MMOL/L (ref 21–32)
CO2 SERPL-SCNC: 26 MMOL/L (ref 21–32)
CREAT SERPL-MCNC: 0.71 MG/DL (ref 0.6–1.3)
CREAT SERPL-MCNC: 0.72 MG/DL (ref 0.6–1.3)
CRTMA(RAPIDTEG MAX AMPLITUDE): 68.2 MM (ref 52–70)
EOSINOPHIL # BLD AUTO: 0.33 THOUSAND/ÂΜL (ref 0–0.61)
EOSINOPHIL NFR BLD AUTO: 3 % (ref 0–6)
ERYTHROCYTE [DISTWIDTH] IN BLOOD BY AUTOMATED COUNT: 13.9 % (ref 11.6–15.1)
FERRITIN SERPL-MCNC: 59 NG/ML (ref 11–307)
GFR SERPL CREATININE-BSD FRML MDRD: 87 ML/MIN/1.73SQ M
GFR SERPL CREATININE-BSD FRML MDRD: 89 ML/MIN/1.73SQ M
GLUCOSE SERPL-MCNC: 112 MG/DL (ref 65–140)
GLUCOSE SERPL-MCNC: 142 MG/DL (ref 65–140)
GLUCOSE SERPL-MCNC: 143 MG/DL (ref 65–140)
GLUCOSE SERPL-MCNC: 147 MG/DL (ref 65–140)
GLUCOSE SERPL-MCNC: 168 MG/DL (ref 65–140)
GLUCOSE SERPL-MCNC: 170 MG/DL (ref 65–140)
GLUCOSE SERPL-MCNC: 185 MG/DL (ref 65–140)
HCO3 BLDA-SCNC: 31.1 MMOL/L (ref 24–30)
HCT VFR BLD AUTO: 26.8 % (ref 34.8–46.1)
HCT VFR BLD AUTO: 26.9 % (ref 34.8–46.1)
HCT VFR BLD AUTO: 29.5 % (ref 34.8–46.1)
HCT VFR BLD CALC: 27 % (ref 34.8–46.1)
HGB BLD-MCNC: 8.3 G/DL (ref 11.5–15.4)
HGB BLD-MCNC: 8.7 G/DL (ref 11.5–15.4)
HGB BLD-MCNC: 8.9 G/DL (ref 11.5–15.4)
HGB BLD-MCNC: 9.7 G/DL (ref 11.5–15.4)
HGB BLDA-MCNC: 9.2 G/DL (ref 11.5–15.4)
HKHMA(MAX AMPLITUDE KAOLIN): 68.9 MM (ref 53–68)
IMM GRANULOCYTES # BLD AUTO: 0.06 THOUSAND/UL (ref 0–0.2)
IMM GRANULOCYTES NFR BLD AUTO: 1 % (ref 0–2)
INR PPP: 1.13 (ref 0.84–1.19)
INR PPP: 1.25 (ref 0.84–1.19)
IRON SATN MFR SERPL: 22 % (ref 15–50)
IRON SERPL-MCNC: 68 UG/DL (ref 50–212)
LACTATE SERPL-SCNC: 1.7 MMOL/L (ref 0.5–2)
LYMPHOCYTES # BLD AUTO: 2.56 THOUSANDS/ÂΜL (ref 0.6–4.47)
LYMPHOCYTES NFR BLD AUTO: 19 % (ref 14–44)
MCH RBC QN AUTO: 30.8 PG (ref 26.8–34.3)
MCHC RBC AUTO-ENTMCNC: 32.9 G/DL (ref 31.4–37.4)
MCV RBC AUTO: 94 FL (ref 82–98)
MONOCYTES # BLD AUTO: 1 THOUSAND/ÂΜL (ref 0.17–1.22)
MONOCYTES NFR BLD AUTO: 8 % (ref 4–12)
NEUTROPHILS # BLD AUTO: 9.2 THOUSANDS/ÂΜL (ref 1.85–7.62)
NEUTS SEG NFR BLD AUTO: 68 % (ref 43–75)
NRBC BLD AUTO-RTO: 0 /100 WBCS
PCO2 BLD: 32 MMOL/L (ref 21–32)
PCO2 BLD: 36.6 MM HG (ref 42–50)
PH BLD: 7.54 [PH] (ref 7.3–7.4)
PLATELET # BLD AUTO: 268 THOUSANDS/UL (ref 149–390)
PMV BLD AUTO: 10.2 FL (ref 8.9–12.7)
PO2 BLD: 42 MM HG (ref 35–45)
POTASSIUM BLD-SCNC: 3.9 MMOL/L (ref 3.5–5.3)
POTASSIUM SERPL-SCNC: 4 MMOL/L (ref 3.5–5.3)
POTASSIUM SERPL-SCNC: 4 MMOL/L (ref 3.5–5.3)
PROT SERPL-MCNC: 5.7 G/DL (ref 6.4–8.4)
PROT SERPL-MCNC: 6.1 G/DL (ref 6.4–8.4)
PROTHROMBIN TIME: 15.2 SECONDS (ref 11.6–14.5)
PROTHROMBIN TIME: 16.4 SECONDS (ref 11.6–14.5)
RBC # BLD AUTO: 3.15 MILLION/UL (ref 3.81–5.12)
RH BLD: POSITIVE
RH BLD: POSITIVE
SAO2 % BLD FROM PO2: 84 % (ref 60–85)
SODIUM BLD-SCNC: 139 MMOL/L (ref 136–145)
SODIUM SERPL-SCNC: 137 MMOL/L (ref 135–147)
SODIUM SERPL-SCNC: 138 MMOL/L (ref 135–147)
SPECIMEN EXPIRATION DATE: NORMAL
SPECIMEN SOURCE: ABNORMAL
TIBC SERPL-MCNC: 307 UG/DL (ref 250–450)
UIBC SERPL-MCNC: 239 UG/DL (ref 155–355)
WBC # BLD AUTO: 13.23 THOUSAND/UL (ref 4.31–10.16)

## 2024-04-07 PROCEDURE — 71045 X-RAY EXAM CHEST 1 VIEW: CPT

## 2024-04-07 PROCEDURE — 30233N1 TRANSFUSION OF NONAUTOLOGOUS RED BLOOD CELLS INTO PERIPHERAL VEIN, PERCUTANEOUS APPROACH: ICD-10-PCS | Performed by: INTERNAL MEDICINE

## 2024-04-07 PROCEDURE — P9016 RBC LEUKOCYTES REDUCED: HCPCS

## 2024-04-07 PROCEDURE — 93005 ELECTROCARDIOGRAM TRACING: CPT

## 2024-04-07 PROCEDURE — 83550 IRON BINDING TEST: CPT | Performed by: STUDENT IN AN ORGANIZED HEALTH CARE EDUCATION/TRAINING PROGRAM

## 2024-04-07 PROCEDURE — 85610 PROTHROMBIN TIME: CPT | Performed by: NURSE PRACTITIONER

## 2024-04-07 PROCEDURE — 86901 BLOOD TYPING SEROLOGIC RH(D): CPT | Performed by: NURSE PRACTITIONER

## 2024-04-07 PROCEDURE — 85730 THROMBOPLASTIN TIME PARTIAL: CPT | Performed by: INTERNAL MEDICINE

## 2024-04-07 PROCEDURE — 74178 CT ABD&PLV WO CNTR FLWD CNTR: CPT

## 2024-04-07 PROCEDURE — 0DJ08ZZ INSPECTION OF UPPER INTESTINAL TRACT, VIA NATURAL OR ARTIFICIAL OPENING ENDOSCOPIC: ICD-10-PCS | Performed by: INTERNAL MEDICINE

## 2024-04-07 PROCEDURE — 86900 BLOOD TYPING SEROLOGIC ABO: CPT | Performed by: NURSE PRACTITIONER

## 2024-04-07 PROCEDURE — 83605 ASSAY OF LACTIC ACID: CPT | Performed by: SURGERY

## 2024-04-07 PROCEDURE — 85014 HEMATOCRIT: CPT

## 2024-04-07 PROCEDURE — 36415 COLL VENOUS BLD VENIPUNCTURE: CPT | Performed by: SURGERY

## 2024-04-07 PROCEDURE — C9113 INJ PANTOPRAZOLE SODIUM, VIA: HCPCS | Performed by: STUDENT IN AN ORGANIZED HEALTH CARE EDUCATION/TRAINING PROGRAM

## 2024-04-07 PROCEDURE — 86923 COMPATIBILITY TEST ELECTRIC: CPT

## 2024-04-07 PROCEDURE — 84132 ASSAY OF SERUM POTASSIUM: CPT

## 2024-04-07 PROCEDURE — 80053 COMPREHEN METABOLIC PANEL: CPT | Performed by: SURGERY

## 2024-04-07 PROCEDURE — 85397 CLOTTING FUNCT ACTIVITY: CPT | Performed by: SURGERY

## 2024-04-07 PROCEDURE — 84295 ASSAY OF SERUM SODIUM: CPT

## 2024-04-07 PROCEDURE — 84484 ASSAY OF TROPONIN QUANT: CPT | Performed by: NURSE PRACTITIONER

## 2024-04-07 PROCEDURE — 85576 BLOOD PLATELET AGGREGATION: CPT | Performed by: SURGERY

## 2024-04-07 PROCEDURE — 70450 CT HEAD/BRAIN W/O DYE: CPT

## 2024-04-07 PROCEDURE — 82947 ASSAY GLUCOSE BLOOD QUANT: CPT

## 2024-04-07 PROCEDURE — 80053 COMPREHEN METABOLIC PANEL: CPT | Performed by: STUDENT IN AN ORGANIZED HEALTH CARE EDUCATION/TRAINING PROGRAM

## 2024-04-07 PROCEDURE — 85384 FIBRINOGEN ACTIVITY: CPT | Performed by: SURGERY

## 2024-04-07 PROCEDURE — 82330 ASSAY OF CALCIUM: CPT

## 2024-04-07 PROCEDURE — 85025 COMPLETE CBC W/AUTO DIFF WBC: CPT | Performed by: SURGERY

## 2024-04-07 PROCEDURE — 85014 HEMATOCRIT: CPT | Performed by: STUDENT IN AN ORGANIZED HEALTH CARE EDUCATION/TRAINING PROGRAM

## 2024-04-07 PROCEDURE — 86850 RBC ANTIBODY SCREEN: CPT | Performed by: NURSE PRACTITIONER

## 2024-04-07 PROCEDURE — 83540 ASSAY OF IRON: CPT | Performed by: STUDENT IN AN ORGANIZED HEALTH CARE EDUCATION/TRAINING PROGRAM

## 2024-04-07 PROCEDURE — 85018 HEMOGLOBIN: CPT | Performed by: STUDENT IN AN ORGANIZED HEALTH CARE EDUCATION/TRAINING PROGRAM

## 2024-04-07 PROCEDURE — 85610 PROTHROMBIN TIME: CPT | Performed by: INTERNAL MEDICINE

## 2024-04-07 PROCEDURE — 82948 REAGENT STRIP/BLOOD GLUCOSE: CPT

## 2024-04-07 PROCEDURE — 85018 HEMOGLOBIN: CPT | Performed by: PHYSICIAN ASSISTANT

## 2024-04-07 PROCEDURE — EDAIR PR ED AIR: Performed by: EMERGENCY MEDICINE

## 2024-04-07 PROCEDURE — 82728 ASSAY OF FERRITIN: CPT | Performed by: STUDENT IN AN ORGANIZED HEALTH CARE EDUCATION/TRAINING PROGRAM

## 2024-04-07 PROCEDURE — 99221 1ST HOSP IP/OBS SF/LOW 40: CPT | Performed by: STUDENT IN AN ORGANIZED HEALTH CARE EDUCATION/TRAINING PROGRAM

## 2024-04-07 PROCEDURE — 85347 COAGULATION TIME ACTIVATED: CPT | Performed by: SURGERY

## 2024-04-07 PROCEDURE — 82803 BLOOD GASES ANY COMBINATION: CPT

## 2024-04-07 PROCEDURE — 72125 CT NECK SPINE W/O DYE: CPT

## 2024-04-07 PROCEDURE — 99024 POSTOP FOLLOW-UP VISIT: CPT | Performed by: INTERNAL MEDICINE

## 2024-04-07 PROCEDURE — C9113 INJ PANTOPRAZOLE SODIUM, VIA: HCPCS | Performed by: PHYSICIAN ASSISTANT

## 2024-04-07 PROCEDURE — 85014 HEMATOCRIT: CPT | Performed by: PHYSICIAN ASSISTANT

## 2024-04-07 RX ORDER — ONDANSETRON 2 MG/ML
4 INJECTION INTRAMUSCULAR; INTRAVENOUS ONCE
Status: COMPLETED | OUTPATIENT
Start: 2024-04-07 | End: 2024-04-07

## 2024-04-07 RX ORDER — PANTOPRAZOLE SODIUM 40 MG/10ML
40 INJECTION, POWDER, LYOPHILIZED, FOR SOLUTION INTRAVENOUS EVERY 12 HOURS SCHEDULED
Status: DISCONTINUED | OUTPATIENT
Start: 2024-04-07 | End: 2024-04-07

## 2024-04-07 RX ORDER — FENTANYL CITRATE/PF 50 MCG/ML
25 SYRINGE (ML) INJECTION
Status: DISCONTINUED | OUTPATIENT
Start: 2024-04-07 | End: 2024-04-07 | Stop reason: HOSPADM

## 2024-04-07 RX ORDER — INSULIN LISPRO 100 [IU]/ML
1-5 INJECTION, SOLUTION INTRAVENOUS; SUBCUTANEOUS
Status: DISCONTINUED | OUTPATIENT
Start: 2024-04-07 | End: 2024-04-10 | Stop reason: HOSPADM

## 2024-04-07 RX ORDER — LIDOCAINE HYDROCHLORIDE 20 MG/ML
INJECTION, SOLUTION EPIDURAL; INFILTRATION; INTRACAUDAL; PERINEURAL AS NEEDED
Status: DISCONTINUED | OUTPATIENT
Start: 2024-04-07 | End: 2024-04-07

## 2024-04-07 RX ORDER — FENTANYL CITRATE 50 UG/ML
INJECTION, SOLUTION INTRAMUSCULAR; INTRAVENOUS AS NEEDED
Status: DISCONTINUED | OUTPATIENT
Start: 2024-04-07 | End: 2024-04-07

## 2024-04-07 RX ORDER — METOCLOPRAMIDE HYDROCHLORIDE 5 MG/ML
10 INJECTION INTRAMUSCULAR; INTRAVENOUS ONCE
Status: DISCONTINUED | OUTPATIENT
Start: 2024-04-07 | End: 2024-04-07

## 2024-04-07 RX ORDER — SODIUM CHLORIDE, SODIUM GLUCONATE, SODIUM ACETATE, POTASSIUM CHLORIDE, MAGNESIUM CHLORIDE, SODIUM PHOSPHATE, DIBASIC, AND POTASSIUM PHOSPHATE .53; .5; .37; .037; .03; .012; .00082 G/100ML; G/100ML; G/100ML; G/100ML; G/100ML; G/100ML; G/100ML
75 INJECTION, SOLUTION INTRAVENOUS CONTINUOUS
Status: DISCONTINUED | OUTPATIENT
Start: 2024-04-07 | End: 2024-04-08

## 2024-04-07 RX ORDER — METOPROLOL SUCCINATE 25 MG/1
25 TABLET, EXTENDED RELEASE ORAL DAILY
Status: DISCONTINUED | OUTPATIENT
Start: 2024-04-07 | End: 2024-04-10

## 2024-04-07 RX ORDER — MECLIZINE HYDROCHLORIDE 25 MG/1
25 TABLET ORAL EVERY 8 HOURS PRN
Status: DISCONTINUED | OUTPATIENT
Start: 2024-04-07 | End: 2024-04-10 | Stop reason: HOSPADM

## 2024-04-07 RX ORDER — HEPARIN SODIUM 1000 [USP'U]/ML
4000 INJECTION, SOLUTION INTRAVENOUS; SUBCUTANEOUS ONCE
Status: COMPLETED | OUTPATIENT
Start: 2024-04-07 | End: 2024-04-07

## 2024-04-07 RX ORDER — PROPOFOL 10 MG/ML
INJECTION, EMULSION INTRAVENOUS CONTINUOUS PRN
Status: DISCONTINUED | OUTPATIENT
Start: 2024-04-07 | End: 2024-04-07

## 2024-04-07 RX ORDER — ONDANSETRON 2 MG/ML
4 INJECTION INTRAMUSCULAR; INTRAVENOUS ONCE AS NEEDED
Status: DISCONTINUED | OUTPATIENT
Start: 2024-04-07 | End: 2024-04-07 | Stop reason: HOSPADM

## 2024-04-07 RX ORDER — GABAPENTIN 100 MG/1
100 CAPSULE ORAL 2 TIMES DAILY
Status: DISCONTINUED | OUTPATIENT
Start: 2024-04-07 | End: 2024-04-10 | Stop reason: HOSPADM

## 2024-04-07 RX ORDER — SODIUM CHLORIDE, SODIUM GLUCONATE, SODIUM ACETATE, POTASSIUM CHLORIDE, MAGNESIUM CHLORIDE, SODIUM PHOSPHATE, DIBASIC, AND POTASSIUM PHOSPHATE .53; .5; .37; .037; .03; .012; .00082 G/100ML; G/100ML; G/100ML; G/100ML; G/100ML; G/100ML; G/100ML
75 INJECTION, SOLUTION INTRAVENOUS CONTINUOUS
Status: DISCONTINUED | OUTPATIENT
Start: 2024-04-07 | End: 2024-04-07

## 2024-04-07 RX ORDER — HYDROCODONE BITARTRATE AND ACETAMINOPHEN 5; 325 MG/1; MG/1
1 TABLET ORAL EVERY 6 HOURS PRN
Status: DISCONTINUED | OUTPATIENT
Start: 2024-04-07 | End: 2024-04-10 | Stop reason: HOSPADM

## 2024-04-07 RX ORDER — SUCRALFATE 1 G/1
1 TABLET ORAL
Status: CANCELLED | OUTPATIENT
Start: 2024-04-07

## 2024-04-07 RX ORDER — SODIUM CHLORIDE 9 MG/ML
INJECTION, SOLUTION INTRAVENOUS CONTINUOUS PRN
Status: DISCONTINUED | OUTPATIENT
Start: 2024-04-07 | End: 2024-04-07

## 2024-04-07 RX ORDER — AMIODARONE HYDROCHLORIDE 200 MG/1
200 TABLET ORAL DAILY
Status: DISCONTINUED | OUTPATIENT
Start: 2024-04-07 | End: 2024-04-10 | Stop reason: HOSPADM

## 2024-04-07 RX ORDER — HEPARIN SODIUM 1000 [USP'U]/ML
4000 INJECTION, SOLUTION INTRAVENOUS; SUBCUTANEOUS EVERY 6 HOURS PRN
Status: DISCONTINUED | OUTPATIENT
Start: 2024-04-07 | End: 2024-04-09

## 2024-04-07 RX ORDER — HEPARIN SODIUM 1000 [USP'U]/ML
2000 INJECTION, SOLUTION INTRAVENOUS; SUBCUTANEOUS EVERY 6 HOURS PRN
Status: DISCONTINUED | OUTPATIENT
Start: 2024-04-07 | End: 2024-04-09

## 2024-04-07 RX ORDER — HEPARIN SODIUM 10000 [USP'U]/100ML
3-20 INJECTION, SOLUTION INTRAVENOUS
Status: DISCONTINUED | OUTPATIENT
Start: 2024-04-07 | End: 2024-04-09

## 2024-04-07 RX ORDER — PROPOFOL 10 MG/ML
INJECTION, EMULSION INTRAVENOUS AS NEEDED
Status: DISCONTINUED | OUTPATIENT
Start: 2024-04-07 | End: 2024-04-07

## 2024-04-07 RX ORDER — PHENYLEPHRINE HCL IN 0.9% NACL 1 MG/10 ML
SYRINGE (ML) INTRAVENOUS AS NEEDED
Status: DISCONTINUED | OUTPATIENT
Start: 2024-04-07 | End: 2024-04-07

## 2024-04-07 RX ORDER — CLOPIDOGREL BISULFATE 75 MG/1
75 TABLET ORAL DAILY
Status: DISCONTINUED | OUTPATIENT
Start: 2024-04-07 | End: 2024-04-10 | Stop reason: HOSPADM

## 2024-04-07 RX ORDER — ATORVASTATIN CALCIUM 40 MG/1
80 TABLET, FILM COATED ORAL EVERY EVENING
Status: DISCONTINUED | OUTPATIENT
Start: 2024-04-07 | End: 2024-04-10 | Stop reason: HOSPADM

## 2024-04-07 RX ORDER — ONDANSETRON 2 MG/ML
INJECTION INTRAMUSCULAR; INTRAVENOUS AS NEEDED
Status: DISCONTINUED | OUTPATIENT
Start: 2024-04-07 | End: 2024-04-07

## 2024-04-07 RX ADMIN — SODIUM CHLORIDE, SODIUM GLUCONATE, SODIUM ACETATE, POTASSIUM CHLORIDE, MAGNESIUM CHLORIDE, SODIUM PHOSPHATE, DIBASIC, AND POTASSIUM PHOSPHATE 75 ML/HR: .53; .5; .37; .037; .03; .012; .00082 INJECTION, SOLUTION INTRAVENOUS at 13:22

## 2024-04-07 RX ADMIN — GABAPENTIN 100 MG: 100 CAPSULE ORAL at 17:45

## 2024-04-07 RX ADMIN — CLOPIDOGREL BISULFATE 75 MG: 75 TABLET ORAL at 13:26

## 2024-04-07 RX ADMIN — HYDROCODONE BITARTRATE AND ACETAMINOPHEN 1 TABLET: 5; 325 TABLET ORAL at 20:59

## 2024-04-07 RX ADMIN — ONDANSETRON 4 MG: 2 INJECTION INTRAMUSCULAR; INTRAVENOUS at 00:47

## 2024-04-07 RX ADMIN — ATORVASTATIN CALCIUM 80 MG: 40 TABLET, FILM COATED ORAL at 17:45

## 2024-04-07 RX ADMIN — HYDROCODONE BITARTRATE AND ACETAMINOPHEN 1 TABLET: 5; 325 TABLET ORAL at 07:28

## 2024-04-07 RX ADMIN — PANTOPRAZOLE SODIUM 40 MG: 40 INJECTION, POWDER, FOR SOLUTION INTRAVENOUS at 08:17

## 2024-04-07 RX ADMIN — PROPOFOL 50 MG: 10 INJECTION, EMULSION INTRAVENOUS at 12:38

## 2024-04-07 RX ADMIN — SODIUM CHLORIDE: 9 INJECTION, SOLUTION INTRAVENOUS at 12:33

## 2024-04-07 RX ADMIN — SODIUM CHLORIDE 8 MG/HR: 9 INJECTION, SOLUTION INTRAVENOUS at 13:42

## 2024-04-07 RX ADMIN — HEPARIN SODIUM 11.1 UNITS/KG/HR: 10000 INJECTION, SOLUTION INTRAVENOUS at 13:29

## 2024-04-07 RX ADMIN — Medication 2000 UNITS: at 05:02

## 2024-04-07 RX ADMIN — SODIUM CHLORIDE, SODIUM GLUCONATE, SODIUM ACETATE, POTASSIUM CHLORIDE, MAGNESIUM CHLORIDE, SODIUM PHOSPHATE, DIBASIC, AND POTASSIUM PHOSPHATE 75 ML/HR: .53; .5; .37; .037; .03; .012; .00082 INJECTION, SOLUTION INTRAVENOUS at 18:38

## 2024-04-07 RX ADMIN — PANTOPRAZOLE SODIUM 40 MG: 40 INJECTION, POWDER, FOR SOLUTION INTRAVENOUS at 02:45

## 2024-04-07 RX ADMIN — ONDANSETRON 4 MG: 2 INJECTION INTRAMUSCULAR; INTRAVENOUS at 12:38

## 2024-04-07 RX ADMIN — INSULIN LISPRO 1 UNITS: 100 INJECTION, SOLUTION INTRAVENOUS; SUBCUTANEOUS at 16:40

## 2024-04-07 RX ADMIN — PROPOFOL 10 MG: 10 INJECTION, EMULSION INTRAVENOUS at 12:42

## 2024-04-07 RX ADMIN — GABAPENTIN 100 MG: 100 CAPSULE ORAL at 08:17

## 2024-04-07 RX ADMIN — NOREPINEPHRINE BITARTRATE 2 MCG/MIN: 1 INJECTION, SOLUTION, CONCENTRATE INTRAVENOUS at 12:37

## 2024-04-07 RX ADMIN — Medication 100 MCG: at 12:44

## 2024-04-07 RX ADMIN — LIDOCAINE HYDROCHLORIDE 100 MG: 20 INJECTION, SOLUTION EPIDURAL; INFILTRATION; INTRACAUDAL; PERINEURAL at 12:38

## 2024-04-07 RX ADMIN — SODIUM CHLORIDE, SODIUM GLUCONATE, SODIUM ACETATE, POTASSIUM CHLORIDE, MAGNESIUM CHLORIDE, SODIUM PHOSPHATE, DIBASIC, AND POTASSIUM PHOSPHATE 75 ML/HR: .53; .5; .37; .037; .03; .012; .00082 INJECTION, SOLUTION INTRAVENOUS at 05:18

## 2024-04-07 RX ADMIN — HEPARIN SODIUM 4000 UNITS: 1000 INJECTION INTRAVENOUS; SUBCUTANEOUS at 13:26

## 2024-04-07 RX ADMIN — FENTANYL CITRATE 25 MCG: 50 INJECTION INTRAMUSCULAR; INTRAVENOUS at 12:38

## 2024-04-07 RX ADMIN — AMIODARONE HYDROCHLORIDE 200 MG: 200 TABLET ORAL at 08:17

## 2024-04-07 RX ADMIN — PROPOFOL 60 MCG/KG/MIN: 10 INJECTION, EMULSION INTRAVENOUS at 12:38

## 2024-04-07 RX ADMIN — IOHEXOL 100 ML: 350 INJECTION, SOLUTION INTRAVENOUS at 02:59

## 2024-04-07 RX ADMIN — TOPICAL ANESTHETIC 1 SPRAY: 200 SPRAY DENTAL; PERIODONTAL at 12:34

## 2024-04-07 NOTE — ANESTHESIA POSTPROCEDURE EVALUATION
Post-Op Assessment Note    CV Status:  Stable  Pain Score: 0    Pain management: adequate       Mental Status:  Alert and awake   Hydration Status:  Euvolemic   PONV Controlled:  Controlled   Airway Patency:  Patent     Post Op Vitals Reviewed: Yes    No anethesia notable event occurred.    Staff: Anesthesiologist, CRNA               BP   91/55   Temp   97.5   Pulse  87   Resp   16   SpO2   93% on RA

## 2024-04-07 NOTE — PROCEDURES
POC FAST US    Date/Time: 4/7/2024 12:10 AM    Performed by: MAHI Kenney  Authorized by: MAHI Kenney    Patient location:  Trauma  Procedure details:     Exam Type:  Diagnostic    Indications: blunt abdominal trauma and blunt chest trauma      Assess for:  Intra-abdominal fluid and pericardial effusion    Technique: FAST      Views obtained:  Heart - Pericardial sac, LUQ - Splenorenal space, Suprapubic - Pouch of Rickie and RUQ - Dickson's Pouch    Image quality: diagnostic      Image availability:  Images available in PACS and video obtained  FAST Findings:     RUQ (Hepatorenal) free fluid: absent      LUQ (Splenorenal) free fluid: absent      Suprapubic free fluid: absent      Cardiac wall motion: identified      Pericardial effusion: absent    Interpretation:     Impressions: negative

## 2024-04-07 NOTE — ASSESSMENT & PLAN NOTE
65 y/o w recent NATHAN on DAPT w ASA/Plavix as well as on Eliquis for Atrial flutter presenting w 1 day of 2 episodes of tarry/coffee ground emesis as well as 2 episodes of melena. No additional episodes in the ED. Patient with soft Bps however HD stable in ED   Hgb drop 14 ->9.7  Will reverse w Kcentra   PPI IV BID  CT high volume abdominal scan   Trend HH q8h   Goal Hgb >8 given CAD  Npo sips with meds  GI eval - anticipate EGD in the morning   Holding asa/plavix/eliquis   Gentle IVF given HFrEF

## 2024-04-07 NOTE — ASSESSMENT & PLAN NOTE
Continue amiodarone 100 daily   Continue toprol 25 daily   AC on hold in the setting of suspected acute upper GIB

## 2024-04-07 NOTE — PROGRESS NOTES
Post admit note      Patient is a pleasant 64-year presenting with hematemesis melena abdominal discomfort.  She has recent history of MI ischemic cardiomyopathy s/p ICD EF 35% and on Eliquis and Plavix.  History chart labs medications reviewed  Presently comfortably in bed.  Denies chest pain shortness of breath.    Vitals:    04/07/24 0600 04/07/24 0734 04/07/24 0805 04/07/24 1049   BP:  (!) 89/51 99/59 92/50   BP Location:    Left arm   Pulse:  88 88 84   Resp:  17  17   Temp:  97.8 °F (36.6 °C)  98.1 °F (36.7 °C)   TempSrc:  Oral     SpO2:  95% 95% 94%   Weight: 95.6 kg (210 lb 12.2 oz)           Comfortably in bed.  Obese.  Short thick neck.  Lungs diminished breath sounds bilaterally.  Heart sounds S1 and S2 noted.  Heart sounds are distant.  Abdomen soft.  Awake follows commands.  No pedal edema.    A/P    GI bleed with hematemesis melena elevated BUN noted possibly upper GI bleed -GI plans for endoscopic evaluation noted.  Monitor hemoglobin  Anemia hemoglobin 8.7 given her significant recent cardiac history she is getting a unit of packed cell as recommended by anesthesia prior to her endoscopic evaluation.  Consent for transfusion obtained from patient  Syncope likely multifactorial possibly vasovagal, monitor orthostatics  Hypotension with blood pressures 89-90 range noted continue IV fluid hydration, monitor orthostatics, avoid hypotension  History of coronary artery disease s/p PCI with NATHAN Plavix aspirin presently on hold.  Continue metoprolol  History of paroxysmal atrial flutter continue amiodarone and Toprol.  Eliquis presently on hold due to GI bleed  Diabetes mellitus type 2 monitor Accu-Cheks, avoid hypoglycemia      Discussed with GI  Discussed with the patient, consent for transfusion obtained from patient.  Called updated daughter Lissett questions answered    Kike

## 2024-04-07 NOTE — H&P
"[facility]  H&P  Name: Vesna Langston 64 y.o. female I MRN: [id number]  Unit/Bed#: S -01 I Date of Admission: 4/6/2024   Date of Service: 4/7/2024 I Hospital Day: 0      Assessment/Plan   Coffee ground emesis  Assessment & Plan  65 y/o w recent NATHAN on DAPT w ASA/Plavix as well as on Eliquis for Atrial flutter presenting w 1 day of 2 episodes of tarry/coffee ground emesis as well as 2 episodes of melena. No additional episodes in the ED. Patient with soft Bps however HD stable in ED   Hgb drop 14 ->9.7  Will reverse w [facility]   CT high volume abdominal scan   Trend HH q8h   Goal Hgb >8 given CAD  Npo sips with meds  GI eval - anticipate EGD in the morning   Holding asa/plavix/eliquis   Gentle IVF given HFrEF    * Syncope  Assessment & Plan  Admitted for syncope last week felt to be 2/2 vasovagal response   Presenting with 1 day history of dizziness and lightheadedness with unknown fall or unknown HS  Trauma w/u negative   Likely in the setting of ABLA as mentioned above   Fall precautions   PT/OT    Atrial flutter, paroxysmal (HCC)  Assessment & Plan  Continue amiodarone 100 daily   Continue toprol 25 daily   AC on hold in the setting of suspected acute upper GIB    Type 2 diabetes mellitus, without long-term current use of insulin (HCC)  Assessment & Plan  Lab Results   Component Value Date    HGBA1C 7.9 (H) 03/22/2024     No results for input(s): \"POCGLU\" in the last 72 hours.  Blood Sugar Average: Last 72 hrs:  NEW diagnosis in march   Not on any antidiabetic medications nor insulin   SSI     Coronary artery disease involving native coronary artery of native heart  Assessment & Plan  CAD c/b recent STEMI in march   S/p PCI to ostial/proximal LAD   Holding ASA/plavix in setting of acute GIB  Continue statin, BB    Sciatica of left side  Assessment & Plan  Continue home gabaptine     S/P ICD (internal cardiac defibrillator) procedure  Assessment & Plan  For sustained vtach prior " admission during which patient found to have STEMI    HFrEF (heart failure with reduced ejection fraction) ([facility])  Assessment & Plan  Wt Readings from Last 3 Encounters:   04/06/24 94.4 kg (208 lb 1.8 oz)   04/02/24 94.7 kg (208 lb 11.2 oz)   04/01/24 90 kg (198 lb 6.6 oz)   EF 35% per last TTE 2/2 ischemic cardiomyopathy w ICD in place  O/p GDMT - entresto, toprol   Diuretics: lasix 20 daily   Low sodium / fluid resctriction   Trend daily weights, I/O  Holding entresto, and lasix in the setting of hypotension 2/2 ABLA  Currently euvolemic appearing    Tobacco abuse  Assessment & Plan  In remission since STEMI in march Decline NRT     Hyperlipemia  Assessment & Plan  Continue statin    VTE Pharmacologic Prophylaxis:   Moderate Risk (Score 3-4) - Pharmacological DVT Prophylaxis Contraindicated. Sequential Compression Devices Ordered.  Code Status: Level 1 - Full Code   Discussion with family: Updated  (daughter) at bedside.    Anticipated Length of Stay: Patient will be admitted on an inpatient basis with an anticipated length of stay of greater than 2 midnights secondary to GIB.    Total Time Spent on Date of Encounter in care of patient: 75 mins. This time was spent on one or more of the following: performing physical exam; counseling and coordination of care; obtaining or reviewing history; documenting in the medical record; reviewing/ordering tests, medications or procedures; communicating with other healthcare professionals and discussing with patient's family/caregivers.    Chief Complaint: syncope, coffee ground emesis    History of Present Illness:  Vesna Langston is a 64 y.o. female with HFrEF 35% w ICD, CAD w recent STEMI s/p PCI to proximal LAD w NATHAN on DAPT, A flutter on Eliquis presenting for recurrent syncope and collapse as well as coffee ground emesis. Patient initially presented as a trauma alert for fall unknown headstrike - trauma work up include CTH and CT C spine were negative.   Since  yesterday she has reported dark/black stools, 1 episode yesterday morning as well as 1 episode this morning. Around 530 pm before dinner patient had a emesis epsiode described as black and liquid, large volume. Patient got very lightheaded at this time. Her BG at this time was 130 per patient and blood pressure was wnl. After that patient was able to tolerate po diet. Around 1130pm patient woke up to go to the bathroom, she felt lightheaded and dizzy. This followed by another episode of tarry emesis, smaller volume. She has not had any repeat episodes of melena or coffee ground emesis although does report ongoing nausea. In the ED patient had MAPs in the 60s, not tachycardic. Labs remarkable for Hgb of 9.7 (from 14 on 4/1) .    Review of Systems:  Review of Systems   Constitutional:  Negative for chills and fever.   HENT:  Negative for ear pain and sore throat.    Eyes:  Negative for pain and visual disturbance.   Respiratory:  Negative for cough and shortness of breath.    Cardiovascular:  Negative for chest pain and palpitations.   Gastrointestinal:  Positive for nausea and vomiting. Negative for abdominal pain.   Genitourinary:  Negative for dysuria and hematuria.   Musculoskeletal:  Negative for arthralgias and back pain.   Skin:  Negative for color change and rash.   Neurological:  Positive for dizziness and light-headedness. Negative for seizures and syncope.   All other systems reviewed and are negative.      Past Medical and Surgical History:   Past Medical History:   Diagnosis Date    Acute respiratory insufficiency 03/22/2024    Allergic     Chronic HFrEF (heart failure with reduced ejection fraction) ([facility])     Coronary artery disease     COVID-19 virus infection 11/28/2022    Diabetes mellitus (HCC)     Hyperlipidemia     Hypoglycemia     ICD (implantable cardioverter-defibrillator) in place     Ischemic cardiomyopathy     Lactic acidosis 03/22/2024    Tobacco abuse        Past Surgical History:    Procedure Laterality Date    ADENOIDECTOMY      CARDIAC CATHETERIZATION N/A 3/22/2024    Procedure: Cardiac pci;  Surgeon: Gabriel Myers MD;  [facility];  Service: Cardiology    CARDIAC CATHETERIZATION N/A 3/22/2024    Procedure: Cardiac Coronary Angiogram;  Surgeon: Gabriel Myers MD;  [facility];  Service: Cardiology    CARDIAC CATHETERIZATION N/A 3/22/2024    Procedure: Cardiac PCI AMI;  Surgeon: Gabriel Myers MD;  [facility];  Service: Cardiology    CARDIAC CATHETERIZATION N/A 3/22/2024    Procedure: Cardiac IVUS;  Surgeon: Gabriel Myers MD;  [facility];  Service: Cardiology    CARDIAC ELECTROPHYSIOLOGY PROCEDURE N/A 3/27/2024    Procedure: Cardiac icd implant;  Surgeon: Jeffy Lama MD;  [facility];  Service: Cardiology     SECTION      ECTOPIC PREGNANCY SURGERY      SEPTOPLASTY      TONSILLECTOMY         Meds/Allergies:  Prior to Admission medications    Medication Sig Start Date End Date Taking? Authorizing Provider   acetaminophen (TYLENOL) 650 mg CR tablet Take 650 mg by mouth every 8 (eight) hours as needed    Historical Provider, MD   albuterol (PROAIR HFA) 90 mcg/act inhaler Inhale 2 puffs every 6 (six) hours as needed for wheezing 19   Brandon Baker MD   amiodarone 200 mg tablet Take 1 tablet (200 mg total) by mouth 3 (three) times a day for 7 days, THEN 1 tablet (200 mg total) daily. 3/28/24 7/2/24  Dia Swartz, DO   apixaban (ELIQUIS) 5 mg Take 1 tablet (5 mg total) by mouth 2 (two) times a day 3/28/24 6/26/24  Dia Swartz, DO   aspirin 81 mg chewable tablet Chew 1 tablet (81 mg total) daily 3/28/24 9/24/24  William Ramirez, DO   atorvastatin (LIPITOR) 80 mg tablet Take 1 tablet (80 mg total) by mouth every evening 3/28/24 6/26/24  Dia Swartz DO   clopidogrel (PLAVIX) 75 mg tablet Take 1 tablet (75 mg total) by mouth daily 3/29/24 6/27/24  Dia Swartz, DO   cyclobenzaprine (AMRIX) 15  MG 24 hr capsule Take 15 mg by mouth daily as needed  Patient not taking: Reported on 4/2/2024    Historical Provider, MD   Diclofenac Sodium (VOLTAREN) 1 % APPLY 2 GRAMS TO THE AFFECTED AREA 4 TIMES A DAY. (2 finger tip units = 1 gram)  Patient not taking: Reported on 4/2/2024    Historical Provider, MD   diphenhydrAMINE (BENADRYL) 50 mg capsule Take 50 mg by mouth every 6 (six) hours as needed for itching    Historical Provider, MD   docusate sodium (COLACE) 50 mg capsule Take 50 mg by mouth daily    Historical Provider, MD   fluticasone (FLONASE) 50 mcg/act nasal spray 2 sprays into each nostril daily    Historical Provider, MD   furosemide (LASIX) 20 mg tablet Take 1 tablet (20 mg total) by mouth daily Do not start before April 2, 2024. 4/2/24 5/2/24  Arash Fabian MD   gabapentin (Neurontin) 100 mg capsule Take 1 capsule (100 mg total) by mouth 2 (two) times a day 3/28/24   William Ramirez DO   meclizine (ANTIVERT) 25 mg tablet Take 1 tablet (25 mg total) by mouth every 8 (eight) hours as needed for dizziness 9/11/21   Brandon Baker MD   methocarbamol (ROBAXIN) 750 mg tablet Take 750 mg by mouth 4 (four) times a day as needed  Patient not taking: Reported on 4/2/2024    Historical Provider, MD   metoprolol succinate (TOPROL-XL) 25 mg 24 hr tablet Take 1 tablet (25 mg total) by mouth daily 4/2/24   Eunice Owen PA-C   mineral oil liquid 30 mL Used in belly button    Historical Provider, MD   Multiple Vitamin (MULTIVITAMIN) capsule Take 1 capsule by mouth daily  Patient not taking: Reported on 4/2/2024    Historical Provider, MD   nicotine (NICODERM CQ) 21 mg/24 hr TD 24 hr patch Place 1 patch on the skin over 24 hours daily  Patient not taking: Reported on 4/2/2024 3/28/24 6/6/24  Dia Swartz DO   ranitidine (ZANTAC) 150 MG capsule Take 150 mg by mouth daily    Historical Provider, MD   sacubitril-valsartan (Entresto) 24-26 MG TABS Take 1 tablet by mouth 2 (two) times a day 3/28/24    William Ramirez DO   senna (SENOKOT) 8.6 mg Take 1 tablet (8.6 mg total) by mouth daily at bedtime 4/1/24 5/1/24  Arash Fabian MD     I have reviewed home medications with patient personally.    Allergies:   Allergies   Allergen Reactions    Shellfish-Derived Products - Food Allergy Anaphylaxis    Azithromycin Rash       Social History:  Marital Status:    Occupation: retired  Patient Pre-hospital Living Situation: Home, Alone  Patient Pre-hospital Level of Mobility: walks with cane  Patient Pre-hospital Diet Restrictions: carb controlled  Substance Use History:   Social History     Substance and Sexual Activity   Alcohol Use Yes    Comment: rarely     Social History     Tobacco Use   Smoking Status Former    Current packs/day: 1.00    Types: Cigarettes   Smokeless Tobacco Never   Tobacco Comments    Smoked 0.5-1 ppd; quit in 03/2024.      Social History     Substance and Sexual Activity   Drug Use Not Currently       Family History:  Family History   Problem Relation Age of Onset    Depression Mother     Heart disease Father     OCD Daughter        Physical Exam:     Vitals:   Blood Pressure: 94/54 (04/07/24 0326)  Pulse: 83 (04/07/24 0326)  Temperature: 98.4 °F (36.9 °C) (04/07/24 0326)  Temp Source: Oral (04/06/24 2359)  Respirations: 18 (04/07/24 0200)  Weight - Scale: 94.4 kg (208 lb 1.8 oz) (04/06/24 2359)  SpO2: 96 % (04/07/24 0326)    Physical Exam  Vitals and nursing note reviewed.   Constitutional:       General: She is not in acute distress.     Appearance: She is well-developed. She is obese.   HENT:      Head: Normocephalic and atraumatic.   Eyes:      Extraocular Movements: Extraocular movements intact.      Conjunctiva/sclera: Conjunctivae normal.      Pupils: Pupils are equal, round, and reactive to light.   Cardiovascular:      Rate and Rhythm: Normal rate and regular rhythm.      Heart sounds: No murmur heard.  Pulmonary:      Effort: Pulmonary effort is normal. No respiratory  distress.      Breath sounds: Normal breath sounds.   Abdominal:      General: Abdomen is flat. Bowel sounds are normal. There is no distension.      Palpations: Abdomen is soft.      Tenderness: There is no abdominal tenderness.   Musculoskeletal:         General: No swelling.      Cervical back: Neck supple.      Right lower leg: No edema.      Left lower leg: No edema.   Skin:     General: Skin is warm and dry.      Capillary Refill: Capillary refill takes less than 2 seconds.   Neurological:      General: No focal deficit present.      Mental Status: She is alert and oriented to person, place, and time.   Psychiatric:         Mood and Affect: Mood normal.          Additional Data:     Lab Results:  Results from last 7 days   Lab Units 04/07/24  0245 04/07/24  0010 04/07/24  0009   WBC Thousand/uL  --   --  13.23*   HEMOGLOBIN g/dL 8.7*  --  9.7*   I STAT HEMOGLOBIN   --    < >  --    HEMATOCRIT % 26.9*  --  29.5*   HEMATOCRIT, ISTAT   --    < >  --    PLATELETS Thousands/uL  --   --  268   NEUTROS PCT %  --   --  68   LYMPHS PCT %  --   --  19   MONOS PCT %  --   --  8   EOS PCT %  --   --  3    < > = values in this interval not displayed.     Results from last 7 days   Lab Units 04/07/24  0010 04/07/24  0009   SODIUM mmol/L  --  137   POTASSIUM mmol/L  --  4.0   CHLORIDE mmol/L  --  101   CO2 mmol/L  --  26   CO2, I-STAT mmol/L 32  --    BUN mg/dL  --  40*   CREATININE mg/dL  --  0.71   ANION GAP mmol/L  --  10   CALCIUM mg/dL  --  8.7   ALBUMIN g/dL  --  3.6   TOTAL BILIRUBIN mg/dL  --  0.50   ALK PHOS U/L  --  31*   ALT U/L  --  27   AST U/L  --  30   GLUCOSE RANDOM mg/dL  --  170*     Results from last 7 days   Lab Units 04/07/24  0045   INR  1.25*     Results from last 7 days   Lab Units 04/01/24  1104 04/01/24  0816   POC GLUCOSE mg/dl 192* 135         Results from last 7 days   Lab Units 04/07/24  0009 04/01/24  0618 04/01/24  0213   LACTIC ACID mmol/L 1.7 1.2  --    PROCALCITONIN ng/ml  --   --  0.10        Lines/Drains:  Invasive Devices       Peripheral Intravenous Line  Duration             Peripheral IV 04/07/24 Dorsal (posterior);Left Forearm <1 day                        Imaging: Reviewed radiology reports from this admission including: CT head  CT head wo contrast   Final Result by Leandro Garza MD (04/07 0042)      Mild senescent changes without acute intracranial hemorrhage or depressed calvarial fracture.                  Workstation performed: [id number]         CT spine cervical wo contrast   Final Result by Leandro Garza MD (04/07 0045)      No acute cervical spine fracture or traumatic malalignment.                  Workstation performed: [id number]         XR trauma multiple    (Results Pending)   XR chest 1 view    (Results Pending)   CT high volume bleeding scan abdomen pelvis    (Results Pending)       EKG and Other Studies Reviewed on Admission:   EKG: NSR. HR 89.    ** Please Note: This note has been constructed using a voice recognition system. **

## 2024-04-07 NOTE — ED PROVIDER NOTES
Emergency Department Airway Evaluation and Management Form    History  Obtained from: Patient  Shellfish-derived products - food allergy and Azithromycin  No chief complaint on file.    HPI    Patient upgraded to level B trauma from triage due to fall, possible head strike, anticoagulation    Past Medical History:   Diagnosis Date    Acute respiratory insufficiency 2024    Allergic     Chronic HFrEF (heart failure with reduced ejection fraction) ([facility])     Coronary artery disease     COVID-19 virus infection 2022    Diabetes mellitus (HCC)     Hyperlipidemia     Hypoglycemia     ICD (implantable cardioverter-defibrillator) in place     Ischemic cardiomyopathy     Lactic acidosis 2024    Tobacco abuse      Past Surgical History:   Procedure Laterality Date    ADENOIDECTOMY      CARDIAC CATHETERIZATION N/A 3/22/2024    Procedure: Cardiac pci;  Surgeon: Gabriel Myers MD;  [facility];  Service: Cardiology    CARDIAC CATHETERIZATION N/A 3/22/2024    Procedure: Cardiac Coronary Angiogram;  Surgeon: Gabriel Myers MD;  [facility];  Service: Cardiology    CARDIAC CATHETERIZATION N/A 3/22/2024    Procedure: Cardiac PCI AMI;  Surgeon: Gabriel Myers MD;  [facility];  Service: Cardiology    CARDIAC CATHETERIZATION N/A 3/22/2024    Procedure: Cardiac IVUS;  Surgeon: Gabriel Myers MD;  [facility];  Service: Cardiology    CARDIAC ELECTROPHYSIOLOGY PROCEDURE N/A 3/27/2024    Procedure: Cardiac icd implant;  Surgeon: Jeffy Lama MD;  [facility];  Service: Cardiology     SECTION      ECTOPIC PREGNANCY SURGERY      SEPTOPLASTY      TONSILLECTOMY       Family History   Problem Relation Age of Onset    Depression Mother     Heart disease Father     OCD Daughter      Social History     Tobacco Use    Smoking status: Former     Current packs/day: 1.00     Types: Cigarettes    Smokeless tobacco: Never    Tobacco comments:      Smoked 0.5-1 ppd; quit in 03/2024.    Substance Use Topics    Alcohol use: Yes     Comment: rarely    Drug use: Not Currently     I have reviewed and agree with the history as documented.    Review of Systems    Per trauma    Physical Exam  /60   Pulse 95   Temp 98.2 °F (36.8 °C) (Oral)   Resp 18   SpO2 97%     Physical Exam  Per trauma    ED Medications  Medications - No data to display    Intubation  Procedures    Notes  Airway intact, equal, bilateral breath sounds.  Additional workup, documentation, disposition per trauma team who is at bedside in trauma bay for trauma level B activation.    Final Diagnosis  Final diagnoses:   None       ED Provider  Electronically Signed by     Rubén Branham MD  04/07/24 0008

## 2024-04-07 NOTE — H&P
"H&P - Trauma   Vesna Langston 64 y.o. female MRN: [id number]  Unit/Bed#: TR-02 Encounter: [id number]    Trauma Alert: Level B   Model of Arrival: Ambulance    Trauma Team: Attending Gudelia and YULY Gomez  Consultants:     None     Assessment/Plan   Active Problems / Assessment:   Syncope and collapse-CT imaging negative for acute traumatic injury.  Nausea  Blood-tinged emesis     Plan:   Admit to medicine for syncope/GI bleed workup.  Consider endoscopy.  24-hour telemetry ordered    History of Present Illness     Chief Complaint: \"I am dizzy\"  Mechanism:Fall     HPI:    Vesna Langston is a 64 y.o. female with recent history of 100% LAD stenosis s/p NATHAN and pacemaker/defibrillator placement.as well as recently being admitted for vasovagal syncope and positive guaiac test, representing today for evaluation after suffering 2 falls in the past 24 hours.  Most recently, prior to arrival patient was getting off the toilet when she felt lightheaded ultimately falling onto her right side.  She described a similar event this morning.  She is also had 2 episodes of vomiting of dark brown emesis.  During my evaluation patient complains of waxing and waning dizziness/lightheadedness as well as nausea.  She does not endorse having any pain or discomfort.    Review of Systems   Gastrointestinal:  Positive for blood in stool (During prior admission), nausea and vomiting.   Neurological:  Positive for dizziness and light-headedness.   All other systems reviewed and are negative.    12-point, complete review of systems was reviewed and negative except as stated above.     Historical Information     Past Medical History:   Diagnosis Date    Acute respiratory insufficiency 03/22/2024    Allergic     Chronic HFrEF (heart failure with reduced ejection fraction) ([facility])     Coronary artery disease     COVID-19 virus infection 11/28/2022    Diabetes mellitus (HCC)     Hyperlipidemia     Hypoglycemia     ICD (implantable " cardioverter-defibrillator) in place     Ischemic cardiomyopathy     Lactic acidosis 2024    Tobacco abuse      Past Surgical History:   Procedure Laterality Date    ADENOIDECTOMY      CARDIAC CATHETERIZATION N/A 3/22/2024    Procedure: Cardiac pci;  Surgeon: Gabriel Myers MD;  Location: [facility];  Service: Cardiology    CARDIAC CATHETERIZATION N/A 3/22/2024    Procedure: Cardiac Coronary Angiogram;  Surgeon: Gabriel Myers MD;  Location: [facility];  Service: Cardiology    CARDIAC CATHETERIZATION N/A 3/22/2024    Procedure: Cardiac PCI AMI;  Surgeon: Gabriel Myers MD;  Location: [facility];  Service: Cardiology    CARDIAC CATHETERIZATION N/A 3/22/2024    Procedure: Cardiac IVUS;  Surgeon: Gabriel Myers MD;  [facility]: [facility];  Service: Cardiology    CARDIAC ELECTROPHYSIOLOGY PROCEDURE N/A 3/27/2024    Procedure: Cardiac icd implant;  Surgeon: Jeffy Lama MD;  Location: [facility];  Service: Cardiology     SECTION      ECTOPIC PREGNANCY SURGERY      SEPTOPLASTY      TONSILLECTOMY          Social History     Tobacco Use    Smoking status: Former     Current packs/day: 1.00     Types: Cigarettes    Smokeless tobacco: Never    Tobacco comments:     Smoked 0.5-1 ppd; quit in 2024.    Substance Use Topics    Alcohol use: Yes     Comment: rarely    Drug use: Not Currently     Immunization History   Administered Date(s) Administered    COVID-19 MODERNA VACC 0.5 ML IM 2021, 2021     Last Tetanus: Unknown  Family History: Non-contributory    1. Before the illness or injury that brought you to the Emergency, did you need someone to help you on a regular basis? 0=No   2. Since the illness or injury that brought you to the Emergency, have you needed more help than usual to take care of yourself? 1=Yes   3. Have you been hospitalized for one or more nights during the past 6 months (excluding a stay in the Emergency Department)? 1=Yes   4. In general, do  you see well? 0=Yes   5. In general, do you have serious problems with your memory? 0=No   6. Do you take more than three different medications everyday? 1=Yes   TOTAL   2     Did you order a geriatric consult if the score was 2 or greater?: n/a     Meds/Allergies   all current active meds have been reviewed  Allergies have not been reviewed;    Allergies   Allergen Reactions    Shellfish-Derived Products - Food Allergy Anaphylaxis    Azithromycin Rash       Objective   Initial Vitals:   Temperature: 98.2 °F (36.8 °C) (04/06/24 2359)  Pulse: 95 (04/06/24 2359)  Respirations: 18 (04/06/24 2359)  Blood Pressure: 132/60 (04/06/24 2359)    Primary Survey:   Airway:        Status: patent;        Pre-hospital Interventions: none        Hospital Interventions: none  Breathing:        Pre-hospital Interventions: none       Effort: normal       Right breath sounds: normal       Left breath sounds: normal  Circulation:        Rhythm: regular       Rate: regular   Right Pulses Left Pulses    R radial: 2+  R femoral: 2+  R pedal: 2+  R carotid: 2+  R popliteal: 2+ L radial: 2+  L femoral: 2+  L pedal: 2+  L carotid: 2+  L popliteal: 2+   Disability:        GCS: Eye: 4; Verbal: 5 Motor: 6 Total: 15       Right Pupil: 4 mm;  round;  reactive         Left Pupil:  4 mm;  round;  reactive      R Motor Strength L Motor Strength    R : 5/5  R dorsiflex: 5/5  R plantarflex: 5/5 L : 5/5  L dorsiflex: 5/5  L plantarflex: 5/5        Sensory:  No sensory deficit  Exposure:       Completed: Yes      Secondary Survey:  Physical Exam  Constitutional:       General: She is not in acute distress.     Appearance: She is not ill-appearing.   HENT:      Head: Normocephalic and atraumatic.      Right Ear: External ear normal.      Left Ear: External ear normal.      Nose: Nose normal.      Mouth/Throat:      Mouth: Mucous membranes are moist.      Pharynx: Oropharynx is clear.   Eyes:      Extraocular Movements: Extraocular movements  intact.      Pupils: Pupils are equal, round, and reactive to light.   Cardiovascular:      Rate and Rhythm: Normal rate and regular rhythm.      Pulses: Normal pulses.      Heart sounds: Normal heart sounds.      Comments: Left upper chest has pacemaker surgical site that is clean, dry, intact.  She also has some ecchymosis on the lower portion.  Pulmonary:      Effort: Pulmonary effort is normal. No respiratory distress.      Breath sounds: Normal breath sounds. No stridor. No wheezing, rhonchi or rales.   Chest:      Chest wall: No tenderness.   Abdominal:      General: Abdomen is flat. Bowel sounds are normal. There is no distension.      Palpations: Abdomen is soft.      Tenderness: There is no abdominal tenderness. There is no guarding.   Genitourinary:     Comments: Pelvis is stable.  Musculoskeletal:      Cervical back: Normal range of motion and neck supple. No rigidity or tenderness.      Comments: Patient is fully ranging all extremities.  No extremity tenderness.  No C, T, L-spine tenderness.  No palpable step-offs.   Skin:     General: Skin is warm and dry.      Capillary Refill: Capillary refill takes less than 2 seconds.   Neurological:      General: No focal deficit present.      Mental Status: She is alert and oriented to person, place, and time.      Sensory: No sensory deficit.      Motor: No weakness.   Psychiatric:         Mood and Affect: Mood normal.         Behavior: Behavior normal.         Thought Content: Thought content normal.         Invasive Devices       None                 Lab Results: I have personally reviewed all pertinent laboratory/test results 04/07/24 and in the preceding 24 hours.  Recent Labs     04/04/24  0736   SODIUM 137   K 4.6      CO2 25   BUN 16   CREATININE 0.75       Imaging Results: I have personally reviewed pertinent images saved in PACS. CT scan findings (and other pertinent positive findings on images) were discussed with radiology. My interpretation of  the images/reports are as follows:  Chest Xray(s): negative for acute findings   FAST exam(s): negative for acute findings   CT Scan(s): negative for acute findings   Additional Xray(s): N/A     Other Studies:   CT spine cervical wo contrast    Result Date: 4/7/2024  Impression: No acute cervical spine fracture or traumatic malalignment. Workstation performed: [id number]     CT head wo contrast    Result Date: 4/7/2024  Impression: Mild senescent changes without acute intracranial hemorrhage or depressed calvarial fracture. Workstation performed: [id number]        Code Status: Prior  Advance Directive and Living Will:      Power of :    POLST:    I have spent 25 minutes with Patient and family today in which greater than 50% of this time was spent in counseling/coordination of care regarding Diagnostic results, Prognosis, Risks and benefits of tx options, Instructions for management, Patient and family education, Importance of tx compliance, Risk factor reductions, Impressions, Counseling / Coordination of care, Documenting in the medical record, Reviewing / ordering tests, medicine, procedures  , Obtaining or reviewing history  , and Communicating with other healthcare professionals .

## 2024-04-07 NOTE — QUICK NOTE
Informed by GI patient with gastric ulcer in the antrum  She is cleared for Plavix, Plavix resumed  Initiate IV heparin gtt. before putting her back on Eliquis  IV pantoprazole gtt.  Monitor hemoglobin      Kike

## 2024-04-07 NOTE — PLAN OF CARE
Problem: PAIN - ADULT  Goal: Verbalizes/displays adequate comfort level or baseline comfort level  Description: Interventions:  - Encourage patient to monitor pain and request assistance  - Assess pain using appropriate pain scale  - Administer analgesics based on type and severity of pain and evaluate response  - Implement non-pharmacological measures as appropriate and evaluate response  - Consider cultural and social influences on pain and pain management  - Notify physician/advanced practitioner if interventions unsuccessful or patient reports new pain  Outcome: Progressing     Problem: INFECTION - ADULT  Goal: Absence or prevention of progression during hospitalization  Description: INTERVENTIONS:  - Assess and monitor for signs and symptoms of infection  - Monitor lab/diagnostic results  - Monitor all insertion sites, i.e. indwelling lines, tubes, and drains  - Monitor endotracheal if appropriate and nasal secretions for changes in amount and color  - Powell appropriate cooling/warming therapies per order  - Administer medications as ordered  - Instruct and encourage patient and family to use good hand hygiene technique  - Identify and instruct in appropriate isolation precautions for identified infection/condition  Outcome: Progressing     Problem: SAFETY ADULT  Goal: Patient will remain free of falls  Description: INTERVENTIONS:  - Educate patient/family on patient safety including physical limitations  - Instruct patient to call for assistance with activity   - Consult OT/PT to assist with strengthening/mobility   - Keep Call bell within reach  - Keep bed low and locked with side rails adjusted as appropriate  - Keep care items and personal belongings within reach  - Initiate and maintain comfort rounds  - Make Fall Risk Sign visible to staff  - Apply yellow socks and bracelet for high fall risk patients  - Consider moving patient to room near nurses station  Outcome: Progressing     Problem: DISCHARGE  PLANNING  Goal: Discharge to home or other facility with appropriate resources  Description: INTERVENTIONS:  - Identify barriers to discharge w/patient and caregiver  - Arrange for needed discharge resources and transportation as appropriate  - Identify discharge learning needs (meds, wound care, etc.)  - Arrange for interpretive services to assist at discharge as needed  - Refer to Case Management Department for coordinating discharge planning if the patient needs post-hospital services based on physician/advanced practitioner order or complex needs related to functional status, cognitive ability, or social support system  Outcome: Progressing

## 2024-04-07 NOTE — ASSESSMENT & PLAN NOTE
"Lab Results   Component Value Date    HGBA1C 7.9 (H) 03/22/2024     No results for input(s): \"POCGLU\" in the last 72 hours.  Blood Sugar Average: Last 72 hrs:  NEW diagnosis in march   Not on any antidiabetic medications nor insulin   SSI   "

## 2024-04-07 NOTE — ANESTHESIA PREPROCEDURE EVALUATION
Procedure:  EGD    Relevant Problems   CARDIO   (+) Atrial flutter, paroxysmal (HCC)   (+) Coronary artery disease involving native coronary artery of native heart   (+) Hyperlipemia   (+) ST elevation myocardial infarction involving left anterior descending (LAD) coronary artery (HCC)      ENDO   (+) New onset type 2 diabetes mellitus (HCC)   (+) Type 2 diabetes mellitus, without long-term current use of insulin (HCC)      GI/HEPATIC   (+) Coffee ground emesis      MUSCULOSKELETAL   (+) Back pain   (+) Chronic low back pain   (+) Sciatica of left side      NEURO/PSYCH   (+) Chronic low back pain      1 U PRBC started in Holding.     Physical Exam    Airway    Mallampati score: II  TM Distance: >3 FB  Neck ROM: full     Dental   No notable dental hx     Cardiovascular      Pulmonary      Other Findings  post-pubertal.      Anesthesia Plan  ASA Score- 4 Emergent    Anesthesia Type- IV sedation with anesthesia with ASA Monitors.         Additional Monitors:     Airway Plan:            Plan Factors-Exercise tolerance (METS): <4 METS.    Chart reviewed. EKG reviewed. Imaging results reviewed. Existing labs reviewed. Patient summary reviewed.    Patient is not a current smoker.  Patient did not smoke on day of surgery.            Induction- intravenous.    Postoperative Plan-     Informed Consent- Anesthetic plan and risks discussed with patient.  I personally reviewed this patient with the CRNA. Discussed and agreed on the Anesthesia Plan with the CRNA..

## 2024-04-07 NOTE — ASSESSMENT & PLAN NOTE
Admitted for syncope last week felt to be 2/2 vasovagal response   Presenting with 1 day history of dizziness and lightheadedness with unknown fall or unknown HS  Trauma w/u negative   Likely in the setting of ABLA as mentioned above   Fall precautions   PT/OT

## 2024-04-07 NOTE — CONSULTS
Consultation -  [facility]  Vesna Langston 64 y.o. female MRN: [id number]  Unit/Bed#: S -01 Encounter: [id number]        Inpatient consult to gastroenterology  Consult performed by: Lorna Clifton PA-C  Consult ordered by: Melvin Navarro DO          Reason for Consult / Principal Problem: Coffee-ground emesis, acute blood loss anemia    ASSESSMENT and PLAN:      64-year-old female with recent diagnosis of MI on Plavix and Eliquis, heart failure with EF of 35% secondary to ischemic cardiomyopathy status post ICD placement who presented to the emergency room for syncopal episode found to have hemoglobin drop from 14-8.7 in less than 1 week.  BUN elevated.  CT high-volume performed but not read yet.    Acute blood loss anemia  Coffee-ground emesis, melena  Patient with recent STEMI placed on Eliquis, Plavix who noticed dark stools over the past few days with development of coffee-ground emesis last night.  Reports episode of coffee-ground emesis.  Hemoglobin has been stable at 8.7 after initial drop.  BUN elevated at 39.    -Plan for urgent EGD today.  Discussed the risks and benefits of the procedure.  - Keep NPO.  - Last dose of Eliquis was yesterday evening though she subsequently vomited after.  Last dose of Plavix yesterday morning.    -Anesthesia is requesting another unit of blood to be given after hemoglobin resulted back at 8.7 today.  Informed primary team.    -Monitor stool output.  -Monitor hemoglobin and transfuse as needed per primary team    - Eliquis and Plavix currently on hold.  Ideally would start these as soon as possible the setting of recent MI.  Further recommendations based on EGD results    -------------------------------------------------------------------------------------------------------------------    HPI:     Vesna Langston is a 64-year-old female with recent diagnosis of MI on Plavix and Eliquis, heart failure with EF of 35% secondary to ischemia  cardiomyopathy status post ICD placement who presented to the emergency room earlier this morning for syncopal episodes recently.  She was found to have hemoglobin of 9.7, down from previously 14 just 1 week ago.  Currently stable at 8.7.  BUN elevated on arrival at 40.  Liver enzymes and electrolytes normal.  Fluctuating blood pressure however currently with maps around 60-70.  She had CT high-volume scan which was performed but not read yet.    Patient reports noticing dark black stools over the last few days.  She did not think much of this.  Yesterday she reports having an episode of dark black emesis around 5 PM.  She then woke up around 11 PM reports feeling dizzy and subsequently had a syncopal episode.  After that she reports having 3 more episodes of coffee-ground emesis.  She denies any iron supplementation or Pepto-Bismol.  She does report longstanding history of excessive NSAID use where she was taking 800 mg of Motrin 3 times daily for over a year.  She recently stopped this in February.  She takes omeprazole 20 mg daily.    Abdominal surgeries consistent for  x 3.    Reports a colonoscopy a few years ago which was reportedly normal.    Prior colonoscopy a few years agoREVIEW OF SYSTEMS:    CONSTITUTIONAL: Denies any fever, chills, or rigors.   HEENT: No earache or tinnitus. Denies hearing loss or visual disturbances.  CARDIOVASCULAR: No chest pain or palpitations.   RESPIRATORY: Denies any cough, hemoptysis, shortness of breath or dyspnea on exertion.  GASTROINTESTINAL: As noted in the History of Present Illness.   GENITOURINARY: No problems with urination. Denies any hematuria or dysuria.  NEUROLOGIC: +dizziness, weakness  MUSCULOSKELETAL: Denies any muscle or joint pain.   SKIN: Denies skin rashes or itching.   ENDOCRINE: Denies excessive thirst. Denies intolerance to heat or cold.  PSYCHOSOCIAL: Denies depression or anxiety. Denies any recent memory loss.       Historical Information   Past  Medical History:   Diagnosis Date    Acute respiratory insufficiency 2024    Allergic     Chronic HFrEF (heart failure with reduced ejection fraction) ([facility])     Coronary artery disease     COVID-19 virus infection 2022    Diabetes mellitus ([facility])     Hyperlipidemia     Hypoglycemia     ICD (implantable cardioverter-defibrillator) in place     Ischemic cardiomyopathy     Lactic acidosis 2024    Tobacco abuse      Past Surgical History:   Procedure Laterality Date    ADENOIDECTOMY      CARDIAC CATHETERIZATION N/A 3/22/2024    Procedure: Cardiac pci;  Surgeon: Gabriel Myers MD;  Location: [facility];  Service: Cardiology    CARDIAC CATHETERIZATION N/A 3/22/2024    Procedure: Cardiac Coronary Angiogram;  Surgeon: Gabriel Myers MD;  Location: [facility];  Service: Cardiology    CARDIAC CATHETERIZATION N/A 3/22/2024    Procedure: Cardiac PCI AMI;  Surgeon: Gabriel Myers MD;  Location: [facility];  Service: Cardiology    CARDIAC CATHETERIZATION N/A 3/22/2024    Procedure: Cardiac IVUS;  Surgeon: Gabriel Myers MD;  Location: [facility];  Service: Cardiology    CARDIAC ELECTROPHYSIOLOGY PROCEDURE N/A 3/27/2024    Procedure: Cardiac icd implant;  Surgeon: Jeffy Lama MD;  Location: [facility];  Service: Cardiology     SECTION      ECTOPIC PREGNANCY SURGERY      SEPTOPLASTY      TONSILLECTOMY       Social History   Social History     Substance and Sexual Activity   Alcohol Use Yes    Comment: rarely     Social History     Substance and Sexual Activity   Drug Use Not Currently     Social History     Tobacco Use   Smoking Status Former    Current packs/day: 1.00    Types: Cigarettes   Smokeless Tobacco Never   Tobacco Comments    Smoked 0.5-1 ppd; quit in 2024.      Family History   Problem Relation Age of Onset    Depression Mother     Heart disease Father     OCD Daughter        Meds/Allergies     Medications Prior to Admission   Medication    acetaminophen  (TYLENOL) 650 mg CR tablet    albuterol (PROAIR HFA) 90 mcg/act inhaler    amiodarone 200 mg tablet    apixaban (ELIQUIS) 5 mg    aspirin 81 mg chewable tablet    atorvastatin (LIPITOR) 80 mg tablet    clopidogrel (PLAVIX) 75 mg tablet    cyclobenzaprine (AMRIX) 15 MG 24 hr capsule    Diclofenac Sodium (VOLTAREN) 1 %    diphenhydrAMINE (BENADRYL) 50 mg capsule    docusate sodium (COLACE) 50 mg capsule    fluticasone (FLONASE) 50 mcg/act nasal spray    furosemide (LASIX) 20 mg tablet    gabapentin (Neurontin) 100 mg capsule    meclizine (ANTIVERT) 25 mg tablet    methocarbamol (ROBAXIN) 750 mg tablet    metoprolol succinate (TOPROL-XL) 25 mg 24 hr tablet    mineral oil liquid    Multiple Vitamin (MULTIVITAMIN) capsule    nicotine (NICODERM CQ) 21 mg/24 hr TD 24 hr patch    ranitidine (ZANTAC) 150 MG capsule    sacubitril-valsartan (Entresto) 24-26 MG TABS    senna (SENOKOT) 8.6 mg     Current Facility-Administered Medications   Medication Dose Route Frequency    amiodarone tablet 200 mg  200 mg Oral Daily    atorvastatin (LIPITOR) tablet 80 mg  80 mg Oral QPM    gabapentin (NEURONTIN) capsule 100 mg  100 mg Oral BID    HYDROcodone-acetaminophen (NORCO) 5-325 mg per tablet 1 tablet  1 tablet Oral Q6H PRN    insulin lispro (HumALOG/ADMELOG) 100 units/mL subcutaneous injection 1-5 Units  1-5 Units Subcutaneous 4x Daily (AC & HS)    meclizine (ANTIVERT) tablet 25 mg  25 mg Oral Q8H PRN    metoprolol succinate (TOPROL-XL) 24 hr tablet 25 mg  25 mg Oral Daily    multi-electrolyte (PLASMALYTE-A/ISOLYTE-S PH 7.4) IV solution  75 mL/hr Intravenous Continuous    pantoprazole (PROTONIX) injection 40 mg  40 mg Intravenous Q12H MONA       Allergies   Allergen Reactions    Shellfish-Derived Products - Food Allergy Anaphylaxis    Azithromycin Rash           Objective     Blood pressure 99/59, pulse 88, temperature 97.8 °F (36.6 °C), temperature source Oral, resp. rate 17, weight 95.6 kg (210 lb 12.2 oz), SpO2 95%.    No intake or  output data in the 24 hours ending 04/07/24 0821      PHYSICAL EXAM:      General Appearance:   Alert, cooperative, no distress, appears stated age    HEENT:   Normocephalic, atraumatic, anicteric.   Neck:  Supple, symmetrical, trachea midline, no adenopathy.   Lungs:   Clear to auscultation bilaterally; no rales, rhonchi or wheezing; respirations unlabored    Heart::   S1 and S2 normal; regular rate and rhythm; no murmur, rub, or gallop.   Abdomen:   Soft, non-tender, non-distended; normal bowel sounds; no masses, no organomegaly    Genitalia:   Deferred    Rectal:   Deferred    Extremities:  No cyanosis, clubbing or edema    Pulses:  2+ and symmetric all extremities    Skin:  Skin color, texture, turgor normal, no rashes or lesions    Lymph nodes:  No palpable cervical, axillary or inguinal lymphadenopathy        Lab Results:   Results from last 7 days   Lab Units 04/07/24  0634 04/07/24  0245 04/07/24  0010 04/07/24  0009   WBC Thousand/uL  --   --   --  13.23*   HEMOGLOBIN g/dL 8.7* 8.7*  --  9.7*   I STAT HEMOGLOBIN   --   --    < >  --    HEMATOCRIT %  --  26.9*  --  29.5*   HEMATOCRIT, ISTAT   --   --    < >  --    PLATELETS Thousands/uL  --   --   --  268   NEUTROS PCT %  --   --   --  68   LYMPHS PCT %  --   --   --  19   MONOS PCT %  --   --   --  8   EOS PCT %  --   --   --  3    < > = values in this interval not displayed.     Results from last 7 days   Lab Units 04/07/24  0010 04/07/24  0009   POTASSIUM mmol/L  --  4.0   CHLORIDE mmol/L  --  101   CO2 mmol/L  --  26   CO2, I-STAT mmol/L 32  --    BUN mg/dL  --  40*   CREATININE mg/dL  --  0.71   CALCIUM mg/dL  --  8.7   ALK PHOS U/L  --  31*   ALT U/L  --  27   AST U/L  --  30   GLUCOSE, ISTAT mg/dl 168*  --      Results from last 7 days   Lab Units 04/07/24  0045   INR  1.25*           Imaging Studies: I have personally reviewed pertinent imaging studies.    CT spine cervical wo contrast    Result Date: 4/7/2024  Impression: No acute cervical spine  fracture or traumatic malalignment. Workstation performed: [id number]     CT head wo contrast    Result Date: 4/7/2024  Impression: Mild senescent changes without acute intracranial hemorrhage or depressed calvarial fracture. Workstation performed: [id number]           Patient was seen and examined by Dr. Ragsdale. All matias medical decisions were made by Dr. Ragsdale. Thank you for allowing us to participate in the care of this present patient. We will follow-up with you closely.

## 2024-04-07 NOTE — ASSESSMENT & PLAN NOTE
CAD c/b recent STEMI in march   S/p PCI to ostial/proximal LAD   Holding ASA/plavix in setting of acute GIB  Continue statin, BB

## 2024-04-07 NOTE — ASSESSMENT & PLAN NOTE
Wt Readings from Last 3 Encounters:   04/06/24 94.4 kg (208 lb 1.8 oz)   04/02/24 94.7 kg (208 lb 11.2 oz)   04/01/24 90 kg (198 lb 6.6 oz)   EF 35% per last TTE 2/2 ischemic cardiomyopathy w ICD in place  O/p GDMT - entresto, toprol   Diuretics: lasix 20 daily   Low sodium / fluid resctriction   Trend daily weights, I/O  Holding entresto, and lasix in the setting of hypotension 2/2 ABLA  Currently euvolemic appearing

## 2024-04-08 LAB
ABO GROUP BLD BPU: NORMAL
ANION GAP SERPL CALCULATED.3IONS-SCNC: 7 MMOL/L (ref 4–13)
APTT PPP: 35 SECONDS (ref 23–37)
APTT PPP: 56 SECONDS (ref 23–37)
APTT PPP: 81 SECONDS (ref 23–37)
ATRIAL RATE: 89 BPM
BASOPHILS # BLD AUTO: 0.03 THOUSANDS/ÂΜL (ref 0–0.1)
BASOPHILS NFR BLD AUTO: 0 % (ref 0–1)
BPU ID: NORMAL
BUN SERPL-MCNC: 15 MG/DL (ref 5–25)
CALCIUM SERPL-MCNC: 7.9 MG/DL (ref 8.4–10.2)
CHLORIDE SERPL-SCNC: 105 MMOL/L (ref 96–108)
CO2 SERPL-SCNC: 25 MMOL/L (ref 21–32)
CREAT SERPL-MCNC: 0.72 MG/DL (ref 0.6–1.3)
CROSSMATCH: NORMAL
EOSINOPHIL # BLD AUTO: 0.62 THOUSAND/ÂΜL (ref 0–0.61)
EOSINOPHIL NFR BLD AUTO: 6 % (ref 0–6)
ERYTHROCYTE [DISTWIDTH] IN BLOOD BY AUTOMATED COUNT: 14.3 % (ref 11.6–15.1)
GFR SERPL CREATININE-BSD FRML MDRD: 87 ML/MIN/1.73SQ M
GLUCOSE SERPL-MCNC: 103 MG/DL (ref 65–140)
GLUCOSE SERPL-MCNC: 118 MG/DL (ref 65–140)
GLUCOSE SERPL-MCNC: 120 MG/DL (ref 65–140)
GLUCOSE SERPL-MCNC: 150 MG/DL (ref 65–140)
GLUCOSE SERPL-MCNC: 82 MG/DL (ref 65–140)
HCT VFR BLD AUTO: 24 % (ref 34.8–46.1)
HGB BLD-MCNC: 7.7 G/DL (ref 11.5–15.4)
HGB BLD-MCNC: 8.2 G/DL (ref 11.5–15.4)
HGB BLD-MCNC: 8.8 G/DL (ref 11.5–15.4)
IMM GRANULOCYTES # BLD AUTO: 0.07 THOUSAND/UL (ref 0–0.2)
IMM GRANULOCYTES NFR BLD AUTO: 1 % (ref 0–2)
LYMPHOCYTES # BLD AUTO: 2.38 THOUSANDS/ÂΜL (ref 0.6–4.47)
LYMPHOCYTES NFR BLD AUTO: 22 % (ref 14–44)
MCH RBC QN AUTO: 30.6 PG (ref 26.8–34.3)
MCHC RBC AUTO-ENTMCNC: 32.1 G/DL (ref 31.4–37.4)
MCV RBC AUTO: 95 FL (ref 82–98)
MONOCYTES # BLD AUTO: 0.82 THOUSAND/ÂΜL (ref 0.17–1.22)
MONOCYTES NFR BLD AUTO: 7 % (ref 4–12)
NEUTROPHILS # BLD AUTO: 7.13 THOUSANDS/ÂΜL (ref 1.85–7.62)
NEUTS SEG NFR BLD AUTO: 64 % (ref 43–75)
NRBC BLD AUTO-RTO: 0 /100 WBCS
P AXIS: 57 DEGREES
PLATELET # BLD AUTO: 223 THOUSANDS/UL (ref 149–390)
PMV BLD AUTO: 10.1 FL (ref 8.9–12.7)
POTASSIUM SERPL-SCNC: 3.6 MMOL/L (ref 3.5–5.3)
PR INTERVAL: 152 MS
QRS AXIS: 66 DEGREES
QRSD INTERVAL: 86 MS
QT INTERVAL: 438 MS
QTC INTERVAL: 532 MS
RBC # BLD AUTO: 2.52 MILLION/UL (ref 3.81–5.12)
SODIUM SERPL-SCNC: 137 MMOL/L (ref 135–147)
T WAVE AXIS: 76 DEGREES
UNIT DISPENSE STATUS: NORMAL
UNIT PRODUCT CODE: NORMAL
UNIT PRODUCT VOLUME: 300 ML
UNIT RH: NORMAL
VENTRICULAR RATE: 89 BPM
WBC # BLD AUTO: 11.05 THOUSAND/UL (ref 4.31–10.16)

## 2024-04-08 PROCEDURE — 82948 REAGENT STRIP/BLOOD GLUCOSE: CPT

## 2024-04-08 PROCEDURE — 93010 ELECTROCARDIOGRAM REPORT: CPT | Performed by: INTERNAL MEDICINE

## 2024-04-08 PROCEDURE — 85025 COMPLETE CBC W/AUTO DIFF WBC: CPT | Performed by: INTERNAL MEDICINE

## 2024-04-08 PROCEDURE — P9016 RBC LEUKOCYTES REDUCED: HCPCS

## 2024-04-08 PROCEDURE — 85730 THROMBOPLASTIN TIME PARTIAL: CPT | Performed by: INTERNAL MEDICINE

## 2024-04-08 PROCEDURE — 30233N1 TRANSFUSION OF NONAUTOLOGOUS RED BLOOD CELLS INTO PERIPHERAL VEIN, PERCUTANEOUS APPROACH: ICD-10-PCS | Performed by: INTERNAL MEDICINE

## 2024-04-08 PROCEDURE — C9113 INJ PANTOPRAZOLE SODIUM, VIA: HCPCS | Performed by: PHYSICIAN ASSISTANT

## 2024-04-08 PROCEDURE — 85018 HEMOGLOBIN: CPT | Performed by: STUDENT IN AN ORGANIZED HEALTH CARE EDUCATION/TRAINING PROGRAM

## 2024-04-08 PROCEDURE — 80048 BASIC METABOLIC PNL TOTAL CA: CPT | Performed by: INTERNAL MEDICINE

## 2024-04-08 PROCEDURE — 99232 SBSQ HOSP IP/OBS MODERATE 35: CPT | Performed by: INTERNAL MEDICINE

## 2024-04-08 PROCEDURE — 99233 SBSQ HOSP IP/OBS HIGH 50: CPT | Performed by: INTERNAL MEDICINE

## 2024-04-08 RX ORDER — ALBUMIN (HUMAN) 12.5 G/50ML
12.5 SOLUTION INTRAVENOUS ONCE
Status: COMPLETED | OUTPATIENT
Start: 2024-04-08 | End: 2024-04-08

## 2024-04-08 RX ORDER — ACETAMINOPHEN 325 MG/1
650 TABLET ORAL EVERY 8 HOURS PRN
Status: DISCONTINUED | OUTPATIENT
Start: 2024-04-08 | End: 2024-04-10 | Stop reason: HOSPADM

## 2024-04-08 RX ORDER — SUCRALFATE 1 G/1
1 TABLET ORAL
Status: DISCONTINUED | OUTPATIENT
Start: 2024-04-08 | End: 2024-04-10 | Stop reason: HOSPADM

## 2024-04-08 RX ADMIN — GABAPENTIN 100 MG: 100 CAPSULE ORAL at 08:27

## 2024-04-08 RX ADMIN — SUCRALFATE 1 G: 1 TABLET ORAL at 16:13

## 2024-04-08 RX ADMIN — GABAPENTIN 100 MG: 100 CAPSULE ORAL at 17:55

## 2024-04-08 RX ADMIN — SODIUM CHLORIDE 8 MG/HR: 9 INJECTION, SOLUTION INTRAVENOUS at 13:56

## 2024-04-08 RX ADMIN — CLOPIDOGREL BISULFATE 75 MG: 75 TABLET ORAL at 08:27

## 2024-04-08 RX ADMIN — AMIODARONE HYDROCHLORIDE 200 MG: 200 TABLET ORAL at 08:27

## 2024-04-08 RX ADMIN — INSULIN LISPRO 1 UNITS: 100 INJECTION, SOLUTION INTRAVENOUS; SUBCUTANEOUS at 12:44

## 2024-04-08 RX ADMIN — SODIUM CHLORIDE 8 MG/HR: 9 INJECTION, SOLUTION INTRAVENOUS at 00:39

## 2024-04-08 RX ADMIN — ALBUMIN (HUMAN) 12.5 G: 0.25 INJECTION, SOLUTION INTRAVENOUS at 08:27

## 2024-04-08 RX ADMIN — ATORVASTATIN CALCIUM 80 MG: 40 TABLET, FILM COATED ORAL at 17:55

## 2024-04-08 RX ADMIN — HYDROCODONE BITARTRATE AND ACETAMINOPHEN 1 TABLET: 5; 325 TABLET ORAL at 07:28

## 2024-04-08 RX ADMIN — HEPARIN SODIUM 14.1 UNITS/KG/HR: 10000 INJECTION, SOLUTION INTRAVENOUS at 17:54

## 2024-04-08 RX ADMIN — ACETAMINOPHEN 650 MG: 325 TABLET, FILM COATED ORAL at 17:57

## 2024-04-08 RX ADMIN — HEPARIN SODIUM 2000 UNITS: 1000 INJECTION INTRAVENOUS; SUBCUTANEOUS at 15:33

## 2024-04-08 RX ADMIN — HEPARIN SODIUM 4000 UNITS: 1000 INJECTION INTRAVENOUS; SUBCUTANEOUS at 07:28

## 2024-04-08 RX ADMIN — SUCRALFATE 1 G: 1 TABLET ORAL at 12:44

## 2024-04-08 RX ADMIN — SUCRALFATE 1 G: 1 TABLET ORAL at 21:01

## 2024-04-08 NOTE — PROGRESS NOTES
"Progress Note - [facility]   Vesna Langston 64 y.o. female [id number]   Unit/Bed#: S -01 Encounter: [id number]     Assessment & Plan   Summary of Diagnosed Injuries:   Syncope and collapse-no injuries found on tertiary evaluation.    PLAN:  Continue medical workup for syncope/GI bleed.  Trauma team will sign off.    VTE Prophylaxis:Sequential compression device (Venodyne)  and Heparin     Disposition: Per medical team.  Trauma team will sign off.  Please call with any questions or concerns.    Code status:  Level 1 - Full Code    Consultants: IP CONSULT TO GASTROENTEROLOGY     Subjective     Mechanism of Injury:Fall     Chief Complaint: \"I always have back pain\"    HPI/Last 24 hour events: This is a 64-year-old female who suffered an episode of syncope and collapse yesterday.  She was found to have no injuries on primary/secondary evaluation and was admitted to the medical service for workup of syncope and possible GI bleeding.    On my examination this morning, patient reports chronic back pain.  She denies any new or worsening pain or discomfort.  She also notes chronic left lower extremity weakness--again denying any new or worsening of symptoms.  She describes feeling better today in comparison to yesterday.  She reports being able to ambulate to the bathroom with minimal lightheadedness.  No other complaints offered.       Objective   Vitals:   Temp:  [97.5 °F (36.4 °C)-98.8 °F (37.1 °C)] 98.5 °F (36.9 °C)  HR:  [80-88] 81  Resp:  [16-18] 16  BP: ()/(50-59) 90/53    I/O         04/06 0701  04/07 0700 04/07 0701  04/08 0700    I.V. (mL/kg)  1198.9 (12.5)    Blood  300    Total Intake(mL/kg)  1498.9 (15.7)    Net  +1498.9                   Physical Exam:   GENERAL APPEARANCE: No acute distress.    NEURO: GCS is 15.  Light touch sensation intact in all extremities.  HEENT: Normocephalic, atraumatic.  Neck supple.  CV: Regular rate and rhythm.  +2 radials and dorsalis pedis pulses, " bilaterally.  LUNGS: Clear to auscultation, bilaterally.  GI: Abdomen is soft nontender.  : Pelvis is stable.  MSK: No deformities.    SKIN: Warm, dry.    Invasive Devices       Peripheral Intravenous Line  Duration             Peripheral IV 04/07/24 Dorsal (posterior);Left Forearm 1 day    Peripheral IV 04/07/24 Right;Ventral (anterior) Wrist <1 day                         Lab Results: Results: I have personally reviewed all pertinent laboratory/tests results, BMP/CMP:   Lab Results   Component Value Date    SODIUM 138 04/07/2024    K 4.0 04/07/2024     04/07/2024    CO2 25 04/07/2024    BUN 38 (H) 04/07/2024    CREATININE 0.72 04/07/2024    CALCIUM 8.5 04/07/2024    AST 19 04/07/2024    ALT 24 04/07/2024    ALKPHOS 30 (L) 04/07/2024    EGFR 87 04/07/2024   , and CBC:   Lab Results   Component Value Date    HGB 8.3 (L) 04/07/2024    HCT 26.8 (L) 04/07/2024       Imaging Results: I have personally reviewed pertinent reports.    Chest Xray(s): negative for acute findings   FAST exam(s): negative for acute findings   CT Scan(s): negative for acute findings   Additional Xray(s): N/A     Other Studies: none

## 2024-04-08 NOTE — PROGRESS NOTES
Progress Note - Vesna Langston 64 y.o. female MRN: [id number]    Unit/Bed#: S -01 Encounter: [id number]      Assessment/Plan:    #1.  Dark-colored stool and anemia in the setting of anticoagulation and antiplatelet therapy, patient had EGD yesterday showing clean-based gastric ulcer.  Was noted with downtrended hemoglobin this morning but otherwise shows no clinical signs of active GI bleeding currently    -Monitor hemoglobin closely    -Continue IV Protonix, drip for 72-hour total and then transition to twice daily dosing    -May continue with diet for now    -If patient shows signs of rebleeding then can repeat EGD if indicated, otherwise can follow-up outpatient and consider repeat EGD in 2 to 3 months        Subjective:   Patient tells me she has not had a bowel movement since Saturday, she is not having any vomiting at this time.  Denies abdominal pain.    Objective:     Vitals: Blood pressure (!) 82/40, pulse 84, temperature 98.7 °F (37.1 °C), resp. rate 16, weight 95.6 kg (210 lb 12.2 oz), SpO2 95%.,Body mass index is 32.05 kg/m².      Intake/Output Summary (Last 24 hours) at 4/8/2024 0844  Last data filed at 4/7/2024 1838  Gross per 24 hour   Intake 1498.85 ml   Output --   Net 1498.85 ml       Physical Exam:   General appearance: alert, appears stated age and cooperative  Lungs: clear to auscultation bilaterally, no labored breathing/accessory muscle use  Heart: regular rate and rhythm, S1, S2 normal, no murmur, click, rub or gallop  Abdomen: soft, non-tender; bowel sounds normal; no masses,  no organomegaly  Extremities: no edema    Invasive Devices       Peripheral Intravenous Line  Duration             Peripheral IV 04/08/24 Left;Upper;Ventral (anterior) Arm <1 day                    Lab, Imaging and other studies: I have personally reviewed pertinent reports.    Admission on 04/06/2024   Component Date Value    CKR(REACTION TIME) 04/07/2024 2.1 (L)     CKK(CLOT KINETICS) 04/07/2024 0.9     CKA(ANGLE)  04/07/2024 78.3 (H)     CKMA(MAX AMPLITUDE) 04/07/2024 69     CKHR(HEPARINASE REACTION* 04/07/2024 2.2 (L)     CRTMA(RAPIDTEG MAX AMPLI* 04/07/2024 68.2     CFFMA (FUNCTIONAL FIBRIN* 04/07/2024 29.7     CFFFLEV 04/07/2024 542     HKHMA(MAX AMPLITUDE KAOL* 04/07/2024 68.9 (H)     ACTFMA(MAX AMPLITUDE ACT* 04/07/2024 18.7     ADPMA(MAX AMPLITUDE ADP) 04/07/2024 56     AAMA (MAX AMPLITUDE AA) 04/07/2024 21.6 (L)     ADPADPINHIBITION 04/07/2024 25.7 (H)     AAASAINHIBITION 04/07/2024 94.2 (H)     ADPADPAGGREGATION 04/07/2024 74.3 (L)     AAASAAGGREGATION 04/07/2024 5.8 (L)     LACTIC ACID 04/07/2024 1.7     Sodium 04/07/2024 137     Potassium 04/07/2024 4.0     Chloride 04/07/2024 101     CO2 04/07/2024 26     ANION GAP 04/07/2024 10     BUN 04/07/2024 40 (H)     Creatinine 04/07/2024 0.71     Glucose 04/07/2024 170 (H)     Calcium 04/07/2024 8.7     AST 04/07/2024 30     ALT 04/07/2024 27     Alkaline Phosphatase 04/07/2024 31 (L)     Total Protein 04/07/2024 6.1 (L)     Albumin 04/07/2024 3.6     Total Bilirubin 04/07/2024 0.50     eGFR 04/07/2024 89     WBC 04/07/2024 13.23 (H)     RBC 04/07/2024 3.15 (L)     Hemoglobin 04/07/2024 9.7 (L)     Hematocrit 04/07/2024 29.5 (L)     MCV 04/07/2024 94     MCH 04/07/2024 30.8     MCHC 04/07/2024 32.9     RDW 04/07/2024 13.9     MPV 04/07/2024 10.2     Platelets 04/07/2024 268     nRBC 04/07/2024 0     Neutrophils Relative 04/07/2024 68     Immature Grans % 04/07/2024 1     Lymphocytes Relative 04/07/2024 19     Monocytes Relative 04/07/2024 8     Eosinophils Relative 04/07/2024 3     Basophils Relative 04/07/2024 1     Neutrophils Absolute 04/07/2024 9.20 (H)     Absolute Immature Grans 04/07/2024 0.06     Absolute Lymphocytes 04/07/2024 2.56     Absolute Monocytes 04/07/2024 1.00     Eosinophils Absolute 04/07/2024 0.33     Basophils Absolute 04/07/2024 0.08     ph, Keven ISTAT 04/07/2024 7.538 (H)     pCO2, Keven i-STAT 04/07/2024 36.6 (L)     pO2, Keven i-STAT 04/07/2024 42.0      BE, i-STAT 04/07/2024 8 (H)     HCO3, Keven i-STAT 04/07/2024 31.1 (H)     CO2, i-STAT 04/07/2024 32     O2 Sat, i-STAT 04/07/2024 84     SODIUM, I-STAT 04/07/2024 139     Potassium, i-STAT 04/07/2024 3.9     Calcium, Ionized i-STAT 04/07/2024 1.14     Hct, i-STAT 04/07/2024 27 (L)     Hgb, i-STAT 04/07/2024 9.2 (L)     Glucose, i-STAT 04/07/2024 168 (H)     Specimen Type 04/07/2024 VENOUS     ABO Grouping 04/07/2024 B     Rh Factor 04/07/2024 Positive     Antibody Screen 04/07/2024 Negative     Specimen Expiration Date 04/07/2024 [id number]     Protime 04/07/2024 16.4 (H)     INR 04/07/2024 1.25 (H)     hs TnI 0hr 04/07/2024 14     ABO Grouping 04/07/2024 B     Rh Factor 04/07/2024 Positive     hs TnI 2hr 04/07/2024 16     Delta 2hr hsTnI 04/07/2024 2     Hemoglobin 04/07/2024 8.7 (L)     Hematocrit 04/07/2024 26.9 (L)     Iron Saturation 04/07/2024 22     TIBC 04/07/2024 307     Iron 04/07/2024 68     UIBC 04/07/2024 239     Ferritin 04/07/2024 59     Hemoglobin 04/07/2024 8.7 (L)     Sodium 04/07/2024 138     Potassium 04/07/2024 4.0     Chloride 04/07/2024 105     CO2 04/07/2024 25     ANION GAP 04/07/2024 8     BUN 04/07/2024 38 (H)     Creatinine 04/07/2024 0.72     Glucose 04/07/2024 142 (H)     Calcium 04/07/2024 8.5     Corrected Calcium 04/07/2024 9.0     AST 04/07/2024 19     ALT 04/07/2024 24     Alkaline Phosphatase 04/07/2024 30 (L)     Total Protein 04/07/2024 5.7 (L)     Albumin 04/07/2024 3.4 (L)     Total Bilirubin 04/07/2024 0.46     eGFR 04/07/2024 87     POC Glucose 04/07/2024 147 (H)     Hemoglobin 04/07/2024 8.7 (L)     Hematocrit 04/07/2024 26.8 (L)     POC Glucose 04/07/2024 143 (H)     Unit Product Code 04/08/2024 [id number]     Unit Number 04/08/2024 [id number]     Unit ABO 04/08/2024 B     Unit RH 04/08/2024 POS     Crossmatch 04/08/2024 Compatible     Unit Dispense Status 04/08/2024 Presumed Trans     Unit Product Volume 04/08/2024 300     Hemoglobin 04/07/2024 8.9 (L)     Unit Product  Code 04/07/2024 [id number]     Unit Number 04/07/2024 [id number]     Unit ABO 04/07/2024 B     Unit RH 04/07/2024 POS     Crossmatch 04/07/2024 Compatible     Unit Dispense Status 04/07/2024 Crossmatched     Unit Product Volume 04/07/2024 300     PTT 04/07/2024 83 (H)     Protime 04/07/2024 15.2 (H)     INR 04/07/2024 1.13     PTT 04/07/2024 151 (HH)     POC Glucose 04/07/2024 185 (H)     Hemoglobin 04/07/2024 8.3 (L)     POC Glucose 04/07/2024 112     PTT 04/08/2024 35     Sodium 04/08/2024 137     Potassium 04/08/2024 3.6     Chloride 04/08/2024 105     CO2 04/08/2024 25     ANION GAP 04/08/2024 7     BUN 04/08/2024 15     Creatinine 04/08/2024 0.72     Glucose 04/08/2024 82     Calcium 04/08/2024 7.9 (L)     eGFR 04/08/2024 87     POC Glucose 04/08/2024 103       Latest Reference Range & Units 04/07/24 21:34 04/07/24 22:17 04/08/24 06:00 04/08/24 08:15   POC Glucose 65 - 140 mg/dl  112  103   Sodium 135 - 147 mmol/L   137    Potassium 3.5 - 5.3 mmol/L   3.6    Chloride 96 - 108 mmol/L   105    Carbon Dioxide 21 - 32 mmol/L   25    ANION GAP 4 - 13 mmol/L   7    BUN 5 - 25 mg/dL   15    Creatinine 0.60 - 1.30 mg/dL   0.72    GLUCOSE 65 - 140 mg/dL   82    Calcium 8.4 - 10.2 mg/dL   7.9 (L)    GFR, Calculated ml/min/1.73sq m   87    PTT 23 - 37 seconds 151 (HH)  35    (HH): Data is critically high  (L): Data is abnormally low

## 2024-04-08 NOTE — CASE MANAGEMENT
Case Management Assessment    Patient name Vesna COLE /S -01 MRN [id number]  : 1960 Date 2024       Current Admission Date: 2024  Current Admission Diagnosis:Syncope   Patient Active Problem List    Diagnosis Date Noted    Coffee ground emesis 2024    Type 2 diabetes mellitus, without long-term current use of insulin ([facility]) 2024    Syncope 2024    Guaiac positive stools 2024    S/P ICD (internal cardiac defibrillator) procedure 2024    Chronic low back pain 2024    HFrEF (heart failure with reduced ejection fraction) ([facility]) 2024    Ischemic cardiomyopathy 2024    Coronary artery disease involving native coronary artery of native heart 2024    S/P coronary artery stent placement 2024    ST elevation myocardial infarction involving left anterior descending (LAD) coronary artery ([facility]) 2024    Atrial flutter, paroxysmal ([facility]) 2024    Hyperlipemia 2024    New onset type 2 diabetes mellitus ([facility]) 2024    Tobacco abuse 2024    History of sustained ventricular tachycardia 2024    Symptoms of upper respiratory infection (URI) 2023    Bronchitis 2022    Back pain 2022    Sciatica of left side 2022      LOS (days): 1  Geometric Mean LOS (GMLOS) (days): 3  Days to GMLOS:1.5     OBJECTIVE:  PATIENT READMITTED TO HOSPITAL  Risk of Unplanned Readmission Score: 18.59         Current admission status: Inpatient       Preferred Pharmacy:   [facility]/pharmacy #[id number] [place], [place] - [address] [address]  Phone: [phone] Fax: [phone]    [facility]/pharmacy #[id number] [facility] [place] - [address] [address]  Phone: [phone] Fax: [phone]    Primary Care Provider: Frannie Mancera DO    Primary Insurance: BLUE CROSS  Secondary Insurance: MEDICARE    ASSESSMENT:  Active Health Care Proxies    There  are no active Health Care Proxies on file.                 Readmission Root Cause  30 Day Readmission: Yes  Who directed you to return to the hospital?: Self  Did you understand whom to contact if you had questions or problems?: Yes  Did you get your prescriptions before you left the hospital?: Yes  Were you able to get your prescriptions filled when you left the hospital?: Yes  Did you take your medications as prescribed?: Yes  Were you able to get to your follow-up appointments?: No  Reason:: Readmitted prior to appointment  During previous admission, was a post-acute recommendation made?: No  Patient was readmitted due to: Syncope, GI bleed  Action Plan: GI consulted s/p EGD, Protonix gtt    Patient Information  Admitted from:: Home  Mental Status: Alert  During Assessment patient was accompanied by: Not accompanied during assessment  Assessment information provided by:: Patient  Primary Caregiver: Self  Support Systems: Family members  County [facility] Residence: [facility]  What city do you live in?: [place]  Home entry access options. Select all that apply.: No steps to enter home  Type of Current Residence: 2 story home (Currently staying with DTR since last admission)  Upon entering residence, is there a bedroom on the main floor (no further steps)?: Yes  Upon entering residence, is there a bathroom on the main floor (no further steps)?: Yes  Is patient a ?: No    Activities of Daily Living Prior to Admission  Functional Status: Independent  Completes ADLs independently?: Yes  Ambulates independently?: Yes  Does patient use assisted devices?: Yes  Assisted Devices (DME) used: Straight Cane, Shower Chair  Does patient currently own DME?: Yes  What DME does the patient currently own?: Straight Cane, Walker, Shower Chair  Does patient have a history of Outpatient Therapy (PT/OT)?: Yes  Does the patient have a history of Short-Term Rehab?: No  Does patient have a history of HHC?: No  Does patient currently have  [facility]?: No         Patient Information Continued  Income Source: Pension/MC[employer]  Does patient have prescription coverage?: Yes  Does patient receive dialysis treatments?: No  Does patient have a history of substance abuse?: No  Does patient have a history of Mental Health Diagnosis?: No         Means of Transportation  Means of Transport to Appts:: Drives Self      Social Determinants of Health (SDOH)      Flowsheet Row Most Recent Value   Housing Stability    In the last 12 months, was there a time when you were not able to pay the mortgage or rent on time? N   In the last 12 months, how many places have you lived? 2  [Currently staying with DTR since her last admission but she has her own home.]   In the last 12 months, was there a time when you did not have a steady place to sleep or slept in a shelter (including now)? N   Transportation Needs    In the past 12 months, has lack of transportation kept you from medical appointments or from getting medications? no   In the past 12 months, has lack of transportation kept you from meetings, work, or from getting things needed for daily living? No   Food Insecurity    Within the past 12 months, you worried that your food would run out before you got the money to buy more. Never true   Within the past 12 months, the food you bought just didn't last and you didn't have money to get more. Never true   Utilities    In the past 12 months has the electric, gas, oil, or water company threatened to shut off services in your home? No

## 2024-04-08 NOTE — UTILIZATION REVIEW
Initial Clinical Review    Admission: Date/Time/Statement:   Admission Orders (From admission, onward)       Ordered        04/07/24 0209  Inpatient Admission  Once                          Orders Placed This Encounter   Procedures    INPATIENT ADMISSION     Standing Status:   Standing     Number of Occurrences:   1     Order Specific Question:   Level of Care     Answer:   Level 2 Stepdown / HOT [14]     Order Specific Question:   Estimated length of stay     Answer:   More than 2 Midnights     Order Specific Question:   Certification     Answer:   I certify that inpatient services are medically necessary for this patient for a duration of greater than two midnights. See H&P and MD Progress Notes for additional information about the patient's course of treatment.     ED Arrival Information       Expected   -    Arrival   4/6/2024 23:49    Acuity   Urgent              Means of arrival   Wheelchair    Escorted by   Family Member    Service   Hospitalist    Admission type   Emergency              Arrival complaint   loc/+thinners/-head/vomitting blood             Chief Complaint   Patient presents with    Fall       Initial Presentation: 64 y.o. female to ED in WC w Family member presents w  recurrent Syncope w collapse, coffee ground emesis; HX DM2, HFrEF 35% w ICD, CAD w recent STEMI s/p PCI to proximal LAD w NATHAN on DAPT, A flutter on Eliquis presents as trauma alert for fall w unknown headstrike.   Reports events began  day prior w dark / black stool 1 event in AM & 1 event this am w emesis prior to today's event described as black/ liquid large volume becoming lightheaded states home BG was 130 w normal BP. After vomiting event she was able to tolerate PO diet, rested  w another tarry emesis smaller volume, cont w nausea, fell off toilet on R side  EXAM  hypotension w/ MAPs in 60s, labs HBG 9.7 down from 14 on 4/1, + blood in stool. CT a/p suspected small hemorrhage anterior wall distal Gastric body; Given  K-Centra. Appears euvolemic  Inpatient SD2 ICU admission due to acute blood loss anemia, melena/ coffee ground emesis in setting of AC likely upper GI bleed, Syncope  Consult GI for recs incl trend H&H Q 8 HR,   goal HGB > 8, NPO, likely EGD in AM, gentle IVF. Fall precautions, PT/OT eval; cont home amiodarone/ toprol; hold AC, cont statin. Cont home Lasix, hold Entresto; SSI. Declines NRT  GI  Monitor hemoglobin closely and transfuse PRBC as needed. IV Protonix drip, urgent upper endoscopy today in the OR. Anesthesia is requesting another unit of blood to be given after hemoglobin resulted back at 8.7 today. Eliquis and Plavix currently on hold.  Ideally would start these as soon as possible the setting of recent MI.  Further recommendations based on EGD   Anesthesia PRE OP eval  ASA Score- 4 Emergent   Attending MD  Informed by GI patient with gastric ulcer in the antrum  Cleared for Plavix, Plavix resumed & Initiate IV heparin gtt. before putting her back on Eliquis  IV pantoprazole gtt.  Monitor hemoglobin.   Date: 4/8/2024   Day 2:   TRAUMA Provider  Cont medical w/up for Syncope/GI bleed; no acute trauma issues  Attending   Hgb drop 14 ->9.7; last night, hemoglobin was 8.3 and this morning, 7.7. S/P K Centra, PPI IV BID w PPI changed to GTT w 4/7 Carafate added.   Cont H&H Q 8HR, goal HGB >8 due to CAD. advanced to surgical light/soft diet.   GI eval anticipate EGD in AM, holding ASA/ Eliquis cont IV Heparin GTT  Acute episode of asymptomatic hypotension, resolved with IV albumin. TX 1 unit pRBC for HGB 7.7. Euvolemic on exam.   ED Triage Vitals   Temperature Pulse Respirations Blood Pressure SpO2   04/06/24 2359 04/06/24 2359 04/06/24 2359 04/06/24 2357 04/06/24 2359   98.2 °F (36.8 °C) 95 18 132/60 97 %      Temp Source Heart Rate Source Patient Position - Orthostatic VS BP Location FiO2 (%)   04/06/24 2359 04/06/24 2359 04/07/24 0011 04/07/24 1049 --   Oral Monitor Lying Left arm       Pain Score        04/07/24 0100       2          Wt Readings from Last 1 Encounters:   04/07/24 95.6 kg (210 lb 12.2 oz)     Additional Vital Signs:   Date/Time Temp Pulse Resp BP MAP (mmHg) SpO2 O2 Device Cardiac (WDL) Patient Position - Orthostatic VS   04/08/24 1120 -- -- -- 110/46 Abnormal  -- -- -- -- --   04/08/24 11:00:24 98.5 °F (36.9 °C) 88 -- 85/47 Abnormal  60 Abnormal  98 % -- -- --   04/08/24 1100 -- -- 18 -- -- -- -- -- --   04/08/24 09:02:15 -- 88 -- 102/50 67 93 % -- -- --   04/08/24 0812 -- -- -- 82/40 Abnormal  -- -- -- -- --   04/08/24 08:06:28 98.7 °F (37.1 °C) 84 -- 70/32 Abnormal  45 Abnormal  95 % -- -- --   04/08/24 03:33:57 98.5 °F (36.9 °C) 81 16 90/53 65 97 % -- -- --   04/07/24 22:19:37 98 °F (36.7 °C) 80 18 94/57 69 95 % -- -- --   04/07/24 18:44:21 98.4 °F (36.9 °C) 83 18 100/57 71 97 % -- -- --   04/07/24 1310 98.7 °F (37.1 °C) 88 18 107/55 -- 97 % None (Room air) WDL --   04/07/24 1300 97.5 °F (36.4 °C) 81 18 97/55 -- 95 % None (Room air) -- --   04/07/24 1255 97.5 °F (36.4 °C) 81 18 94/53 -- 95 % None (Room air) -- --   04/07/24 1252 97.5 °F (36.4 °C) 84 18 91/55 -- 96 % None (Room air) WDL --   04/07/24 1223 98.8 °F (37.1 °C) 81 18 102/56 -- 97 % None (Room air) -- --   04/07/24 1157 98.5 °F (36.9 °C) 81 18 98/53 -- 98 % None (Room air) -- --   04/07/24 10:49:03 98.1 °F (36.7 °C) 84 17 92/50 -- 94 % -- -- --   04/07/24 08:05:47 -- 88 -- 99/59 72 95 % -- -- --   04/07/24 07:34:17 97.8 °F (36.6 °C) 88 17 89/51 Abnormal  64 Abnormal  95 % -- -- --   04/07/24 03:26:08 98.4 °F (36.9 °C) 83 -- 94/54 67 96 % -- -- --   04/07/24 0245 -- 83 -- 92/51 66 96 % -- -- --   04/07/24 0230 -- 82 -- 103/62 77 96 % -- -- --   04/07/24 0210 -- 83 -- -- -- 95 % -- -- --   04/07/24 0205 -- 83 -- -- -- 95 % -- -- --   04/07/24 0200 -- 84 18 91/55 -- 94 % None (Room air) -- Lying   04/07/24 0155 -- 84 -- -- -- 96 % -- -- --   04/07/24 0150 -- 83 -- -- -- 94 % -- -- --   04/07/24 0145 -- 83 18 92/53 -- 94 % -- -- Lying    04/07/24 0140 -- 86 -- 97/57 71 94 % -- -- --   04/07/24 0135 -- 88 -- -- -- 93 % -- -- --   04/07/24 0130 -- 86 18 89/52 Abnormal  -- 92 % None (Room air) -- --   04/07/24 0125 -- 86 -- -- -- 93 % -- -- --   04/07/24 0120 -- 85 -- -- -- 92 % -- -- --   04/07/24 0115 -- 92 18 86/52 Abnormal  -- 96 % None (Room air) -- Lying   04/07/24 0110 -- 84 -- -- -- 94 % -- -- --   04/07/24 0105 -- 87 -- -- -- 95 % -- -- --   04/07/24 0100 -- 83 18 92/50 -- 96 % None (Room air) -- --   04/07/24 0055 -- 84 -- -- -- 93 % -- -- --   04/07/24 0050 -- 84 -- -- -- 95 % -- -- --   04/07/24 0045 -- 95 18 94/56 -- 97 % None (Room air) -- Lying   04/07/24 0040 -- 86 -- -- -- 97 % -- -- --   04/07/24 0039 -- -- -- 97/56 -- -- -- -- --   04/07/24 0035 -- 87 -- -- -- 96 % -- -- --   04/07/24 0032 -- -- -- 105/56 -- -- -- -- --   04/07/24 0030 -- 88 18 105/56 -- 98 % None (Room air) -- Lying   04/07/24 0025 -- 87 -- -- -- 96 % -- -- --   04/07/24 00:11:57 -- 92 18 104/61 -- 97 % None (Room air) -- Lying   04/07/24 0010 -- 93 -- -- -- 98 % -- -- --   04/07/24 0005 -- 93 -- -- -- 97 % -- -- --   04/07/24 0000 -- 88 -- -- -- 98 % -- -- --   04/06/24 23:59:27 98.2 °F (36.8 °C) 95 18 132/60 -- 97 % None (Room air) -- --   04/06/24 2357 -- -- -- 132/60 -- -- -- -- --     Weights (last 14 days)    Date/Time Weight Weight Method   04/07/24 0600 95.6 kg (210 lb 12.2 oz) Standing scale   04/06/24 23:59:27 94.4 kg (208 lb 1.8 oz) Stretcher scale     Pertinent Labs/Diagnostic Test Results:   4/7/2024 EGD =           IMPRESSION:  Esophagus-fresher linear deep erosion was noted in the distal esophagus at the GE junction  Stomach-fresh horizontal white-based ulcer noted in the antrum under an inflamed fold.  Another small fresh ulceration was noted in the adjacent area.  No evidence of any active bleeding.  The duodenum appeared normal.    RECOMMENDATION:    Protonix drip, add Carafate, avoid NSAIDs.  Check stool for H. pylori antigen.  Continue Plavix  because of recent stent placement.  Since patient is at increased risks requiring anticoagulation therapy we can consider using IV heparin for a couple of days before putting her back on Eliquis and watch for any recurrent bleeding        CT high volume bleeding scan abdomen pelvis   Final Result by Shana Byrne MD (04/07 1224)   Suspected small hemorrhage from the anterior wall of the distal gastric body with focal wall thickening. Bleeding due to underlying lesion or PUD is suspected. Recommend endoscopic correlation.   No other acute findings in the GI tract.   Diverticulosis coli. No diverticulitis. Normal appendix.   Cholelithiasis in the otherwise unremarkable gallbladder.   Other chronic findings, as per the body of the report.      CT head wo contrast   Final Result by Leandro Garza MD (04/07 0042)      Mild senescent changes without acute intracranial hemorrhage or depressed calvarial fracture.      CT spine cervical wo contrast   Final Result by Leandro Garza MD (04/07 0045)      No acute cervical spine fracture or traumatic malalignment.      XR trauma multiple   Final Result by Angel Luis Montes MD (04/08 0821)      No acute cardiopulmonary disease within limitations of supine imaging.      XR chest 1 view   Final Result by Angel Luis Montes MD (04/08 0914)      No acute cardiopulmonary disease within limitations of supine imaging.               Workstation performed: [id number]           4/7 EKG= Normal sinus rhythm  Septal infarct (cited on or before 27-MAR-2024)  Prolonged QT  Abnormal ECG  When compared with ECG of 01-APR-2024 01:57,  Questionable change in initial forces of Anterior leads  Confirmed by Jose Henao ([id number]) on 4/8/2024 8:59:44 AM      Results from last 7 days   Lab Units 04/08/24  0839 04/07/24  2133 04/07/24  1434 04/07/24  1033 04/07/24  0634 04/07/24  0245 04/07/24  0010 04/07/24  0009   WBC Thousand/uL 11.05*  --   --   --   --   --   --  13.23*   HEMOGLOBIN g/dL 7.7* 8.3* 8.9* 8.7*  "8.7* 8.7*  --  9.7*   I STAT HEMOGLOBIN g/dl  --   --   --   --   --   --  9.2*  --    HEMATOCRIT % 24.0*  --   --  26.8*  --  26.9*  --  29.5*   HEMATOCRIT, ISTAT %  --   --   --   --   --   --  27*  --    PLATELETS Thousands/uL 223  --   --   --   --   --   --  268   NEUTROS ABS Thousands/µL 7.13  --   --   --   --   --   --  9.20*         Results from last 7 days   Lab Units 04/08/24  0600 04/07/24  0634 04/07/24  0010 04/07/24  0009 04/04/24  0736   SODIUM mmol/L 137 138  --  137 137   POTASSIUM mmol/L 3.6 4.0  --  4.0 4.6   CHLORIDE mmol/L 105 105  --  101 105   CO2 mmol/L 25 25  --  26 25   CO2, I-STAT mmol/L  --   --  32  --   --    ANION GAP mmol/L 7 8  --  10 7   BUN mg/dL 15 38*  --  40* 16   CREATININE mg/dL 0.72 0.72  --  0.71 0.75   EGFR ml/min/1.73sq m 87 87  --  89 83   CALCIUM mg/dL 7.9* 8.5  --  8.7 9.1   CALCIUM, IONIZED, ISTAT mmol/L  --   --  1.14  --   --      Results from last 7 days   Lab Units 04/07/24 0634 04/07/24  0009   AST U/L 19 30   ALT U/L 24 27   ALK PHOS U/L 30* 31*   TOTAL PROTEIN g/dL 5.7* 6.1*   ALBUMIN g/dL 3.4* 3.6   TOTAL BILIRUBIN mg/dL 0.46 0.50     Results from last 7 days   Lab Units 04/08/24  1101 04/08/24  0815 04/07/24  2217 04/07/24  1626 04/07/24  1117 04/07/24  0731   POC GLUCOSE mg/dl 150* 103 112 185* 143* 147*     Results from last 7 days   Lab Units 04/08/24  0600 04/07/24  0634 04/07/24  0009   GLUCOSE RANDOM mg/dL 82 142* 170*             No results found for: \"BETA-HYDROXYBUTYRATE\"           Results from last 7 days   Lab Units 04/07/24  0010   PH, BRITTANI I-STAT  7.538*   PCO2, BRITTANI ISTAT mm HG 36.6*   PO2, BRITTANI ISTAT mm HG 42.0   HCO3, BRITTANI ISTAT mmol/L 31.1*   I STAT BASE EXC mmol/L 8*   I STAT O2 SAT % 84         Results from last 7 days   Lab Units 04/07/24  0245 04/07/24  0045   HS TNI 0HR ng/L  --  14   HS TNI 2HR ng/L 16  --    HSTNI D2 ng/L 2  --          Results from last 7 days   Lab Units 04/08/24  0600 04/07/24  2134 04/07/24  1434 04/07/24  0045 "   PROTIME seconds  --   --  15.2* 16.4*   INR   --   --  1.13 1.25*   PTT seconds 35 151* 83*  --              Results from last 7 days   Lab Units 04/07/24  0009   LACTIC ACID mmol/L 1.7                 Results from last 7 days   Lab Units 04/07/24  0009   FERRITIN ng/mL 59   IRON SATURATION % 22   IRON ug/dL 68   TIBC ug/dL 307         Results from last 7 days   Lab Units 04/08/24  0547 04/07/24  1231   UNIT PRODUCT CODE  [id number]   UNIT NUMBER  [id number]   UNITABO  B B   UNITRH  POS POS   CROSSMATCH  Compatible Compatible   UNIT DISPENSE STATUS  Presumed Trans Crossmatched   UNIT PRODUCT VOL ml 300 300     ED Treatment:   Medication Administration from 04/06/2024 2347 to 04/07/2024 0320         Date/Time Order Dose Route Action Action by Comments     04/07/2024 0047 EDT ondansetron (ZOFRAN) injection 4 mg 4 mg Intravenous Given Olivia Nair RN --     04/07/2024 0245 EDT pantoprazole (PROTONIX) injection 40 mg 40 mg Intravenous Given Olivia Nair RN --     04/07/2024 0259 EDT iohexol (OMNIPAQUE) 350 MG/ML injection (MULTI-DOSE) 100 mL 100 mL Intravenous Given Amanda Payne --          Past Medical History:   Diagnosis Date    Acute respiratory insufficiency 03/22/2024    Allergic     Chronic HFrEF (heart failure with reduced ejection fraction) ([facility])     Coronary artery disease     COVID-19 virus infection 11/28/2022    Diabetes mellitus ([facility])     Hyperlipidemia     Hypoglycemia     ICD (implantable cardioverter-defibrillator) in place     Ischemic cardiomyopathy     Lactic acidosis 03/22/2024    Tobacco abuse      Present on Admission:   HFrEF (heart failure with reduced ejection fraction) ([facility])   Atrial flutter, paroxysmal ([facility])   S/P ICD (internal cardiac defibrillator) procedure   Coronary artery disease involving native coronary artery of native heart   Hyperlipemia   Sciatica of left side   Syncope   Tobacco abuse   Coffee ground emesis   Type 2 diabetes mellitus,  without long-term current use of insulin ([facility])      Admitting Diagnosis: Syncope and collapse [R55]  Head injury [S09.90XA]  Upper GI bleed [K92.2]  Coffee ground emesis [K92.0]  Age/Sex: 64 y.o. female  Admission Orders:  Continuous cardiopulmonary & pulse oximetry  Serial H&H Q 8 HR  Urgent EGD  TX for Goal Hgb >8 given CAD   NPO  I/O    Scheduled Medications:  amiodarone, 200 mg, Oral, Daily  atorvastatin, 80 mg, Oral, QPM  clopidogrel, 75 mg, Oral, Daily  gabapentin, 100 mg, Oral, BID  insulin lispro, 1-5 Units, Subcutaneous, 4x Daily (AC & HS)  metoprolol succinate, 25 mg, Oral, Daily  sucralfate, 1 g, Oral, 4x Daily (AC & HS)    4/7 IV x1=prothrombin complex concentrate (human) (Kcentra) 2,000 Units  Dose: 2,000 Units  Freq: Once Route: IV  Start: 04/07/24 0345 End: 04/07/24 0502     4/7 x2 IV=pantoprazole (PROTONIX) injection 40 mg  Dose: 40 mg  Freq: Every 12 hours scheduled Route: IV  Start: 04/07/24 0215 End: 04/07/24 1259     Continuous IV Infusions:  heparin (porcine), 3-20 Units/kg/hr (Order-Specific), Intravenous, Titrated  multi-electrolyte, 75 mL/hr, Intravenous, Continuous  pantoprazole (PROTONIX) 80 mg in sodium chloride 0.9 % 100 mL infusion, 8 mg/hr, Intravenous, Continuous      PRN Meds:  heparin (porcine), 2,000 Units, Intravenous, Q6H PRN  heparin (porcine), 4,000 Units, Intravenous, Q6H PRN  HYDROcodone-acetaminophen, 1 tablet, Oral, Q6H PRN  meclizine, 25 mg, Oral, Q8H PRN        IP CONSULT TO GASTROENTEROLOGY    Network Utilization Review Department  ATTENTION: Please call with any questions or concerns to [phone] and carefully listen to the prompts so that you are directed to the right person. All voicemails are confidential.   For Discharge needs, contact Care Management DC Support Team at [phone] opt. 2  Send all requests for admission clinical reviews, approved or denied determinations and any other requests to dedicated fax number below belonging to the campus where the  patient is receiving treatment. List of dedicated fax numbers for the Facilities:  FACILITY NAME UR FAX NUMBER   ADMISSION DENIALS (Administrative/Medical Necessity) [phone]   DISCHARGE SUPPORT TEAM (NETWORK) [phone]   PARENT CHILD HEALTH (Maternity/NICU/Pediatrics) [phone]   [facility] [phone]   [facility] [phone]   [facility] [phone]   [facility] [phone]   [facility] [phone]   [facility] [phone]   [facility] [phone]   [facility] [phone]   [facility] [phone]   [facility] [phone]   [facility] [phone]   [facility] [phone]

## 2024-04-08 NOTE — ASSESSMENT & PLAN NOTE
Wt Readings from Last 3 Encounters:   04/07/24 95.6 kg (210 lb 12.2 oz)   04/02/24 94.7 kg (208 lb 11.2 oz)   04/01/24 90 kg (198 lb 6.6 oz)   EF 35% per last TTE 2/2 ischemic cardiomyopathy w ICD in place  O/p GDMT - entresto, toprol   Diuretics: lasix 20 daily   Low sodium / fluid resctriction   Trend daily weights, I/O  Holding entresto, and lasix in the setting of hypotension 2/2 ABLA  Currently euvolemic appearing

## 2024-04-08 NOTE — UTILIZATION REVIEW
NOTIFICATION OF INPATIENT ADMISSION   AUTHORIZATION REQUEST   SERVICING FACILITY:   [place] [address]  Tax ID: [id number]  NPI: [id number]   ATTENDING PROVIDER:  Attending Name and NPI#: Laquita Liang Md [[id number]]  Address: [address] [place] [address]  Phone: [phone]     ADMISSION INFORMATION:  Place of Service: Inpatient [facility]  Place of Service Code: 21  Inpatient Admission Date/Time: 4/7/24  2:08 AM  Discharge Date/Time: No discharge date for patient encounter.  Admitting Diagnosis Code/Description:  Syncope and collapse [R55]  Head injury [S09.90XA]  Upper GI bleed [K92.2]  Coffee ground emesis [K92.0]     UTILIZATION REVIEW CONTACT:  Cali Felton Utilization   Network Utilization Review Department  Phone: [phone]  Fax: [phone]  Email: [email or web address]  Contact for approvals/pending authorizations, clinical reviews, and discharge.     PHYSICIAN ADVISORY SERVICES:  Medical Necessity Denial & Mbsr-dp-Ivgo Review  Phone: [phone]  Fax: [phone]  Email: [email or web address]     DISCHARGE SUPPORT TEAM:  For Patients Discharge Needs & Updates  Phone: [phone] opt. 2 Fax: [phone]  Email: [email or web address]

## 2024-04-08 NOTE — ASSESSMENT & PLAN NOTE
Continue amiodarone 100 daily   Continue toprol 25 daily   Eliquis on hold in the setting of suspected acute upper GIB  Gastroenterology is okay with IV heparin for now.

## 2024-04-08 NOTE — PROGRESS NOTES
[facility]  Progress Note  Name: Vesna Langston I  MRN: [id number]  Unit/Bed#: S -01 I Date of Admission: 4/6/2024   Date of Service: 4/8/2024 I Hospital Day: 1    Assessment/Plan   Coffee ground emesis  Assessment & Plan  No active bleeding at this point.  65 y/o w recent NATHAN on DAPT w ASA/Plavix as well as on Eliquis for Atrial flutter presenting w 1 day of 2 episodes of tarry/coffee ground emesis as well as 2 episodes of melena. No additional episodes in the ED. Patient with soft Bps however HD stable in ED   Hgb drop 14 ->9.7; last night, hemoglobin was 8.3 and this morning, 7.7.  Status post reversal w Kcentra   Status post PPI IV BID; with EGD findings of deep erosion on patient's distal esophagus/GE junction, with fresh white-based stomach ulcer noted in patient's antrum under the inflamed fold; another small pressure ulceration was noted in the adjacent area, but no evidence of any active bleeding, PPI was changed to Protonix drip, 4/7, and added Carafate.  CT high volume abdominal scan: Suspicion for gastric bleeding.  Trend HH q8h   Goal Hgb >8 given CAD  Diet was advanced to surgical light/soft diet.  GI eval - anticipate EGD in the morning   Holding asa and eliquis; but GI doctor okay with Plavix, which was started yesterday.  GI doctor also recommended having the patient on IV heparin drip for now instead of going back on Eliquis.  Status post gentle IVF given HFrEF.  Today, 4/8, patient had an episode of asymptomatic hypotension, resolved with IV albumin.  Transfuse as needed.  With history of recent STEMI a few weeks ago, status post PCI, goal hemoglobin for this patient is at least 8.0.  With hemoglobin 7.7 today, thus a unit of packed RBCs was ordered to be transfused today (according to blood bank staff, patient still has an outstanding order of 1 unit of packed RBCs; thus this will be carried out).    * Syncope  Assessment & Plan  Admitted for syncope last week felt to  be 2/2 vasovagal response   Presenting with 1 day history of dizziness and lightheadedness with unknown fall or unknown HS  Trauma w/u negative   Likely in the setting of ABLA as mentioned above   Fall precautions   PT/OT    HFrEF (heart failure with reduced ejection fraction) ([facility])  Assessment & Plan  Wt Readings from Last 3 Encounters:   04/07/24 95.6 kg (210 lb 12.2 oz)   04/02/24 94.7 kg (208 lb 11.2 oz)   04/01/24 90 kg (198 lb 6.6 oz)   EF 35% per last TTE 2/2 ischemic cardiomyopathy w ICD in place  O/p GDMT - entresto, toprol   Diuretics: lasix 20 daily   Low sodium / fluid resctriction   Trend daily weights, I/O  Holding entresto, and lasix in the setting of hypotension 2/2 ABLA  Currently euvolemic appearing    Coronary artery disease involving native coronary artery of native heart  Assessment & Plan  CAD c/b recent STEMI in march   S/p PCI to ostial/proximal LAD   Status post holding off ASA/plavix in setting of acute GIB; 4/7, gastroenterologist was okay with restart of Plavix.  Thus, continue Plavix.  Continue to hold off aspirin until cleared by gastroenterologist.  Continue statin, BB    Atrial flutter, paroxysmal ([facility])  Assessment & Plan  Continue amiodarone 100 daily   Continue toprol 25 daily   Eliquis on hold in the setting of suspected acute upper GIB  Gastroenterology is okay with IV heparin for now.    S/P ICD (internal cardiac defibrillator) procedure  Assessment & Plan  For sustained vtach prior admission during which patient found to have STEMI    Type 2 diabetes mellitus, without long-term current use of insulin ([facility])  Assessment & Plan  Lab Results   Component Value Date    HGBA1C 7.9 (H) 03/22/2024     Recent Labs     04/07/24  1626 04/07/24  2217 04/08/24  0815 04/08/24  1101   POCGLU 185* 112 103 150*     Blood Sugar Average: Last 72 hrs:  (P) 140NEW diagnosis in march   Not on any antidiabetic medications nor insulin   SSI   Accu-Cheks.  Hypoglycemia protocol.  Adjust treatment  accordingly.    Sciatica of left side  Assessment & Plan  Continue home gabaptin     Tobacco abuse  Assessment & Plan  In remission since STEMI in march Decline NRT     Hyperlipemia  Assessment & Plan  Continue statin             VTE Pharmacologic Prophylaxis: VTE Score: 3 Moderate Risk (Score 3-4) - Pharmacological DVT Prophylaxis Ordered: heparin drip.    Mobility:   Basic Mobility Inpatient Raw Score: 18  JH-HLM Goal: 6: Walk 10 steps or more  JH-HLM Achieved: 6: Walk 10 steps or more  JH-HLM Goal NOT achieved. Continue with multidisciplinary rounding and encourage appropriate mobility to improve upon JH-HLM goals.    Patient Centered Rounds: I performed bedside rounds with nursing staff today.   Discussions with Specialists or Other Care Team Provider: GI advanced practitioner.  .    Education and Discussions with Family / Patient: Updated  (daughter) via phone.    Total Time Spent on Date of Encounter in care of patient: This time was spent on one or more of the following: performing physical exam; counseling and coordination of care; obtaining or reviewing history; documenting in the medical record; reviewing/ordering tests, medications or procedures; communicating with other healthcare professionals and discussing with patient's family/caregivers.    Current Length of Stay: 1 day(s)  Current Patient Status: Inpatient   Certification Statement: The patient will continue to require additional inpatient hospital stay due to findings and plans.  Discharge Plan: Anticipate discharge in 48-72 hrs to home.    Code Status: Level 1 - Full Code    Subjective:   Patient was seen and examined this morning.  Patient was doing fine and better.  Patient denied any chest pains or any abdominal pains or any other pains.  Patient denied any dizziness.  No nausea or vomiting.  No fever or chills.  No dizziness.  Patient denied any bleeding.  No complaints.    Objective:     Vitals:   Temp (24hrs),  Av.4 °F (36.9 °C), Min:98 °F (36.7 °C), Max:98.7 °F (37.1 °C)    Temp:  [98 °F (36.7 °C)-98.7 °F (37.1 °C)] 98.5 °F (36.9 °C)  HR:  [80-88] 88  Resp:  [16-18] 18  BP: ()/(32-57) 110/46  SpO2:  [93 %-98 %] 98 %  Body mass index is 32.05 kg/m².     Input and Output Summary (last 24 hours):     Intake/Output Summary (Last 24 hours) at 2024 1325  Last data filed at 2024 1011  Gross per 24 hour   Intake 1178.85 ml   Output --   Net 1178.85 ml       Physical Exam:   Physical Exam  Vitals and nursing note reviewed.   Constitutional:       General: She is not in acute distress.     Appearance: She is not ill-appearing, toxic-appearing or diaphoretic.   Cardiovascular:      Rate and Rhythm: Normal rate and regular rhythm.      Heart sounds: Normal heart sounds.   Pulmonary:      Effort: Pulmonary effort is normal. No respiratory distress.      Breath sounds: Normal breath sounds.   Abdominal:      General: Bowel sounds are normal. There is no distension.      Palpations: Abdomen is soft.      Tenderness: There is no abdominal tenderness. There is no guarding or rebound.   Musculoskeletal:      Right lower leg: No edema.      Left lower leg: No edema.   Skin:     General: Skin is warm.      Coloration: Skin is not pale.      Findings: No erythema or rash.   Neurological:      General: No focal deficit present.      Mental Status: She is oriented to person, place, and time.   Psychiatric:         Mood and Affect: Mood normal.         Behavior: Behavior normal.         Thought Content: Thought content normal.            Additional Data:     Labs:  Results from last 7 days   Lab Units 24  0839   WBC Thousand/uL 11.05*   HEMOGLOBIN g/dL 7.7*   HEMATOCRIT % 24.0*   PLATELETS Thousands/uL 223   NEUTROS PCT % 64   LYMPHS PCT % 22   MONOS PCT % 7   EOS PCT % 6     Results from last 7 days   Lab Units 24  0600 24  0634   SODIUM mmol/L 137 138   POTASSIUM mmol/L 3.6 4.0   CHLORIDE mmol/L 105 105    CO2 mmol/L 25 25   BUN mg/dL 15 38*   CREATININE mg/dL 0.72 0.72   ANION GAP mmol/L 7 8   CALCIUM mg/dL 7.9* 8.5   ALBUMIN g/dL  --  3.4*   TOTAL BILIRUBIN mg/dL  --  0.46   ALK PHOS U/L  --  30*   ALT U/L  --  24   AST U/L  --  19   GLUCOSE RANDOM mg/dL 82 142*     Results from last 7 days   Lab Units 04/07/24  1434   INR  1.13     Results from last 7 days   Lab Units 04/08/24  1101 04/08/24  0815 04/07/24  2217 04/07/24  1626 04/07/24  1117 04/07/24  0731   POC GLUCOSE mg/dl 150* 103 112 185* 143* 147*         Results from last 7 days   Lab Units 04/07/24  0009   LACTIC ACID mmol/L 1.7       Lines/Drains:  Invasive Devices       Peripheral Intravenous Line  Duration             Long-Dwell Peripheral IV (Midline) 04/08/24 Right Basilic <1 day    Peripheral IV 04/08/24 Left;Upper;Ventral (anterior) Arm <1 day                      Telemetry:  Telemetry Orders (From admission, onward)               24 Hour Telemetry Monitoring  Continuous x 24 Hours (Telem)        Question:  Reason for 24 Hour Telemetry  Answer:  Syncope suspected to be cardiac in origin                     Telemetry Reviewed: Normal Sinus Rhythm  Indication for Continued Telemetry Use: Arrthymias requiring medical therapy             Imaging: Reviewed radiology reports from this admission including: chest xray, abdominal/pelvic CT, CT head, and CT cervical spine    Recent Cultures (last 7 days):         Last 24 Hours Medication List:   Current Facility-Administered Medications   Medication Dose Route Frequency Provider Last Rate    amiodarone  200 mg Oral Daily Jasmit Kaelyn-Kals, DO      atorvastatin  80 mg Oral QPM Jasmit Kaelyn-Kals, DO      clopidogrel  75 mg Oral Daily Hollis Stokes MD      gabapentin  100 mg Oral BID Jasmit Kaelyn-Kals, DO      heparin (porcine)  3-20 Units/kg/hr (Order-Specific) Intravenous Titrated Hollis Stokes MD 12.1 Units/kg/hr (04/08/24 0731)    heparin (porcine)  2,000 Units Intravenous Q6H PRN  Hollis Stokes MD      heparin (porcine)  4,000 Units Intravenous Q6H PRN Hollis Stokes MD      HYDROcodone-acetaminophen  1 tablet Oral Q6H PRN Jasmit Kaelyn-Kals, DO      insulin lispro  1-5 Units Subcutaneous 4x Daily (AC & HS) Jasmit Kaelyn-Kals, DO      meclizine  25 mg Oral Q8H PRN Jasmit Kaelyn-Kals, DO      metoprolol succinate  25 mg Oral Daily Jasmit Kaelyn-Kals, DO      pantoprazole (PROTONIX) 80 mg in sodium chloride 0.9 % 100 mL infusion  8 mg/hr Intravenous Continuous Lorna Clifton PA-C 8 mg/hr (04/08/24 0039)    sucralfate  1 g Oral 4x Daily (AC & HS) Jessa Ragsdale MD          Today, Patient Was Seen By: Laquita Liang MD    **Please Note: This note may have been constructed using a voice recognition system.**

## 2024-04-08 NOTE — ASSESSMENT & PLAN NOTE
No active bleeding at this point.  65 y/o w recent NATHAN on DAPT w ASA/Plavix as well as on Eliquis for Atrial flutter presenting w 1 day of 2 episodes of tarry/coffee ground emesis as well as 2 episodes of melena. No additional episodes in the ED. Patient with soft Bps however HD stable in ED   Hgb drop 14 ->9.7; last night, hemoglobin was 8.3 and this morning, 7.7.  Status post reversal w Kcentra   Status post PPI IV BID; with EGD findings of deep erosion on patient's distal esophagus/GE junction, with fresh white-based stomach ulcer noted in patient's antrum under the inflamed fold; another small pressure ulceration was noted in the adjacent area, but no evidence of any active bleeding, PPI was changed to Protonix drip, 4/7, and added Carafate.  CT high volume abdominal scan: Suspicion for gastric bleeding.  Trend HH q8h   Goal Hgb >8 given CAD  Diet was advanced to surgical light/soft diet.  GI eval - anticipate EGD in the morning   Holding asa and eliquis; but GI doctor okay with Plavix, which was started yesterday.  GI doctor also recommended having the patient on IV heparin drip for now instead of going back on Eliquis.  Status post gentle IVF given HFrEF.  Today, 4/8, patient had an episode of asymptomatic hypotension, resolved with IV albumin.  Transfuse as needed.  With history of recent STEMI a few weeks ago, status post PCI, goal hemoglobin for this patient is at least 8.0.  With hemoglobin 7.7 today, thus a unit of packed RBCs was ordered to be transfused today (according to blood bank staff, patient still has an outstanding order of 1 unit of packed RBCs; thus this will be carried out).

## 2024-04-08 NOTE — PLAN OF CARE
Problem: PAIN - ADULT  Goal: Verbalizes/displays adequate comfort level or baseline comfort level  Description: Interventions:  - Encourage patient to monitor pain and request assistance  - Assess pain using appropriate pain scale  - Administer analgesics based on type and severity of pain and evaluate response  - Implement non-pharmacological measures as appropriate and evaluate response  - Consider cultural and social influences on pain and pain management  - Notify physician/advanced practitioner if interventions unsuccessful or patient reports new pain  Outcome: Progressing     Problem: INFECTION - ADULT  Goal: Absence or prevention of progression during hospitalization  Description: INTERVENTIONS:  - Assess and monitor for signs and symptoms of infection  - Monitor lab/diagnostic results  - Monitor all insertion sites, i.e. indwelling lines, tubes, and drains  - Monitor endotracheal if appropriate and nasal secretions for changes in amount and color  - [place] appropriate cooling/warming therapies per order  - Administer medications as ordered  - Instruct and encourage patient and family to use good hand hygiene technique  - Identify and instruct in appropriate isolation precautions for identified infection/condition  Outcome: Progressing     Problem: SAFETY ADULT  Goal: Patient will remain free of falls  Description: INTERVENTIONS:  - Educate patient/family on patient safety including physical limitations  - Instruct patient to call for assistance with activity   - Consult OT/PT to assist with strengthening/mobility   - Keep Call bell within reach  - Keep bed low and locked with side rails adjusted as appropriate  - Keep care items and personal belongings within reach  - Initiate and maintain comfort rounds  - Make Fall Risk Sign visible to staff  - Apply yellow socks and bracelet for high fall risk patients  - Consider moving patient to room near nurses station  Outcome: Progressing     Problem: DISCHARGE  PLANNING  Goal: Discharge to home or other facility with appropriate resources  Description: INTERVENTIONS:  - Identify barriers to discharge w/patient and caregiver  - Arrange for needed discharge resources and transportation as appropriate  - Identify discharge learning needs (meds, wound care, etc.)  - Arrange for interpretive services to assist at discharge as needed  - Refer to Case Management Department for coordinating discharge planning if the patient needs post-hospital services based on physician/advanced practitioner order or complex needs related to functional status, cognitive ability, or social support system  Outcome: Progressing

## 2024-04-08 NOTE — ASSESSMENT & PLAN NOTE
Lab Results   Component Value Date    HGBA1C 7.9 (H) 03/22/2024     Recent Labs     04/07/24  1626 04/07/24  2217 04/08/24  0815 04/08/24  1101   POCGLU 185* 112 103 150*     Blood Sugar Average: Last 72 hrs:  (P) 140NEW diagnosis in march   Not on any antidiabetic medications nor insulin   SSI   Accu-Cheks.  Hypoglycemia protocol.  Adjust treatment accordingly.

## 2024-04-08 NOTE — PROCEDURES
Venous Access Line Insertion    Date/Time: 4/8/2024 12:45 PM    Performed by: Feyl Jordan RN  Authorized by: Laquita Liang MD    Patient location:  Bedside  [place] protocol:     Procedure explained and questions answered to patient or proxy's satisfaction: yes    Pre-procedure details:     Hand hygiene: Hand hygiene performed prior to insertion      Sterile barrier technique: All elements of maximal sterile technique followed      Skin preparation:  2% chlorhexidine    Skin preparation agent: Skin preparation agent completely dried prior to procedure    Procedure details:     Complex Venous Access Line Type: Midline      Complex Venous Access Line Indications: no peripheral vascular access      Orientation:  Right    Location:  Basilic    Catheter size:  18 gauge    Total catheter length (cm):  10    Catheter out on skin (cm):  0    Patient evaluated for contraindications to access (i.e. fistula, thrombosis, etc): Yes      Sterile ultrasound techniques: Sterile gel and sterile probe covers were used      Number of attempts:  1    Successful placement: yes      Landmarks identified: yes    Anesthesia (see MAR for exact dosages):     Anesthesia method:  None  Post-procedure details:     Post-procedure:  Dressing applied and securement device placed    Assessment:  Blood return through all ports    Post-procedure complications: none      Patient tolerance of procedure:  Tolerated well, no immediate complications

## 2024-04-08 NOTE — ASSESSMENT & PLAN NOTE
CAD c/b recent STEMI in march   S/p PCI to ostial/proximal LAD   Status post holding off ASA/plavix in setting of acute GIB; 4/7, gastroenterologist was okay with restart of Plavix.  Thus, continue Plavix.  Continue to hold off aspirin until cleared by gastroenterologist.  Continue statin, BB

## 2024-04-09 PROBLEM — R79.89 ELEVATED TSH: Status: ACTIVE | Noted: 2024-04-09

## 2024-04-09 LAB
ABO GROUP BLD BPU: NORMAL
ANION GAP SERPL CALCULATED.3IONS-SCNC: 5 MMOL/L (ref 4–13)
APTT PPP: 59 SECONDS (ref 23–37)
APTT PPP: 93 SECONDS (ref 23–37)
BPU ID: NORMAL
BUN SERPL-MCNC: 10 MG/DL (ref 5–25)
CALCIUM SERPL-MCNC: 8.1 MG/DL (ref 8.4–10.2)
CHLORIDE SERPL-SCNC: 106 MMOL/L (ref 96–108)
CO2 SERPL-SCNC: 26 MMOL/L (ref 21–32)
CREAT SERPL-MCNC: 0.79 MG/DL (ref 0.6–1.3)
CROSSMATCH: NORMAL
ERYTHROCYTE [DISTWIDTH] IN BLOOD BY AUTOMATED COUNT: 14.9 % (ref 11.6–15.1)
GFR SERPL CREATININE-BSD FRML MDRD: 78 ML/MIN/1.73SQ M
GLUCOSE SERPL-MCNC: 100 MG/DL (ref 65–140)
GLUCOSE SERPL-MCNC: 108 MG/DL (ref 65–140)
GLUCOSE SERPL-MCNC: 155 MG/DL (ref 65–140)
GLUCOSE SERPL-MCNC: 96 MG/DL (ref 65–140)
GLUCOSE SERPL-MCNC: 98 MG/DL (ref 65–140)
HCT VFR BLD AUTO: 26.1 % (ref 34.8–46.1)
HGB BLD-MCNC: 8.4 G/DL (ref 11.5–15.4)
MCH RBC QN AUTO: 30.3 PG (ref 26.8–34.3)
MCHC RBC AUTO-ENTMCNC: 32.2 G/DL (ref 31.4–37.4)
MCV RBC AUTO: 94 FL (ref 82–98)
PLATELET # BLD AUTO: 215 THOUSANDS/UL (ref 149–390)
PMV BLD AUTO: 9.8 FL (ref 8.9–12.7)
POTASSIUM SERPL-SCNC: 3.8 MMOL/L (ref 3.5–5.3)
RBC # BLD AUTO: 2.77 MILLION/UL (ref 3.81–5.12)
SODIUM SERPL-SCNC: 137 MMOL/L (ref 135–147)
UNIT DISPENSE STATUS: NORMAL
UNIT PRODUCT CODE: NORMAL
UNIT PRODUCT VOLUME: 300 ML
UNIT RH: NORMAL
WBC # BLD AUTO: 8.56 THOUSAND/UL (ref 4.31–10.16)

## 2024-04-09 PROCEDURE — 85730 THROMBOPLASTIN TIME PARTIAL: CPT | Performed by: INTERNAL MEDICINE

## 2024-04-09 PROCEDURE — 82948 REAGENT STRIP/BLOOD GLUCOSE: CPT

## 2024-04-09 PROCEDURE — 97166 OT EVAL MOD COMPLEX 45 MIN: CPT

## 2024-04-09 PROCEDURE — C9113 INJ PANTOPRAZOLE SODIUM, VIA: HCPCS | Performed by: PHYSICIAN ASSISTANT

## 2024-04-09 PROCEDURE — 80048 BASIC METABOLIC PNL TOTAL CA: CPT | Performed by: INTERNAL MEDICINE

## 2024-04-09 PROCEDURE — 85027 COMPLETE CBC AUTOMATED: CPT | Performed by: INTERNAL MEDICINE

## 2024-04-09 PROCEDURE — 99232 SBSQ HOSP IP/OBS MODERATE 35: CPT | Performed by: PHYSICIAN ASSISTANT

## 2024-04-09 PROCEDURE — 97163 PT EVAL HIGH COMPLEX 45 MIN: CPT

## 2024-04-09 RX ORDER — POLYETHYLENE GLYCOL 3350 17 G/17G
17 POWDER, FOR SOLUTION ORAL DAILY
Status: DISCONTINUED | OUTPATIENT
Start: 2024-04-09 | End: 2024-04-10

## 2024-04-09 RX ORDER — PANTOPRAZOLE SODIUM 40 MG/1
40 TABLET, DELAYED RELEASE ORAL
Status: DISCONTINUED | OUTPATIENT
Start: 2024-04-09 | End: 2024-04-10 | Stop reason: HOSPADM

## 2024-04-09 RX ADMIN — GABAPENTIN 100 MG: 100 CAPSULE ORAL at 17:37

## 2024-04-09 RX ADMIN — CLOPIDOGREL BISULFATE 75 MG: 75 TABLET ORAL at 09:33

## 2024-04-09 RX ADMIN — HYDROCODONE BITARTRATE AND ACETAMINOPHEN 1 TABLET: 5; 325 TABLET ORAL at 03:34

## 2024-04-09 RX ADMIN — AMIODARONE HYDROCHLORIDE 200 MG: 200 TABLET ORAL at 09:33

## 2024-04-09 RX ADMIN — SUCRALFATE 1 G: 1 TABLET ORAL at 05:38

## 2024-04-09 RX ADMIN — ATORVASTATIN CALCIUM 80 MG: 40 TABLET, FILM COATED ORAL at 17:37

## 2024-04-09 RX ADMIN — GABAPENTIN 100 MG: 100 CAPSULE ORAL at 09:33

## 2024-04-09 RX ADMIN — SODIUM CHLORIDE 8 MG/HR: 9 INJECTION, SOLUTION INTRAVENOUS at 00:25

## 2024-04-09 RX ADMIN — APIXABAN 5 MG: 5 TABLET, FILM COATED ORAL at 21:41

## 2024-04-09 RX ADMIN — SUCRALFATE 1 G: 1 TABLET ORAL at 17:37

## 2024-04-09 RX ADMIN — MECLIZINE HYDROCHLORIDE 25 MG: 25 TABLET ORAL at 03:34

## 2024-04-09 RX ADMIN — SUCRALFATE 1 G: 1 TABLET ORAL at 11:33

## 2024-04-09 RX ADMIN — INSULIN LISPRO 1 UNITS: 100 INJECTION, SOLUTION INTRAVENOUS; SUBCUTANEOUS at 21:42

## 2024-04-09 RX ADMIN — POLYETHYLENE GLYCOL 3350 17 G: 17 POWDER, FOR SOLUTION ORAL at 17:38

## 2024-04-09 RX ADMIN — SUCRALFATE 1 G: 1 TABLET ORAL at 21:41

## 2024-04-09 RX ADMIN — PANTOPRAZOLE SODIUM 40 MG: 40 TABLET, DELAYED RELEASE ORAL at 17:37

## 2024-04-09 NOTE — ASSESSMENT & PLAN NOTE
recent NATHAN on DAPT w ASA/Plavix as well as on Eliquis for Atrial flutter presented with tarry/coffee ground emesis as well as 2 episodes of melena.   Hgb drop 14 ->9.7-->8.4  Status post reversal w Kcentra   EGD findings of deep erosion on patient's distal esophagus/GE junction, with fresh white-based stomach ulcer noted in patient's antrum under the inflamed fold; another small pressure ulceration was noted in the adjacent area, but no evidence of any active bleeding  Protonix drip was added 4/7, change to PO PPI BID now  CT high volume abdominal scan: Suspicion for gastric bleeding.

## 2024-04-09 NOTE — ASSESSMENT & PLAN NOTE
Wt Readings from Last 3 Encounters:   04/09/24 95.1 kg (209 lb 10.5 oz)   04/02/24 94.7 kg (208 lb 11.2 oz)   04/01/24 90 kg (198 lb 6.6 oz)   EF 35% per last TTE 2/2 ischemic cardiomyopathy w ICD in place  O/p GDMT - entresto, toprol   Diuretics: lasix 20 daily   Low sodium / fluid resctriction   Trend daily weights, I/O  Holding entresto, and lasix in the setting of hypotension 2/2 ABLA  Currently euvolemic appearing

## 2024-04-09 NOTE — ASSESSMENT & PLAN NOTE
With admission on 3/22/24  Continue follow-up with cardiology.  Continue Plavix   Continue Regimen: PanOxyl QAM \\nRetin-A (until patient uses the rest of product - then change medication as patient states her insurance no longer covers this medication) \\nElta facial cleanser Plan: Discussed starting OCP. Patient states she would like to speak to her parents prior to initiating treatment. Patient denies any family history of blood clots. Detail Level: Zone

## 2024-04-09 NOTE — ASSESSMENT & PLAN NOTE
Lab Results   Component Value Date    HGBA1C 7.9 (H) 03/22/2024     Recent Labs     04/08/24  1553 04/08/24  2101 04/09/24  0742 04/09/24  1122   POCGLU 120 118 96 100     Blood Sugar Average: Last 72 hrs:  (P) 127.4NEW diagnosis in march   Not on any antidiabetic medications   Blood sugars are stable at this time without any meds

## 2024-04-09 NOTE — OCCUPATIONAL THERAPY NOTE
Did not observe eating, grooming, UB bathing, LB bathing, UB dressing, and toileting at time of evaluation, with use of clinical reasoning, pt's performance throughout evaluation indicates that pt may be able to perform these tasks at the levels listed above      Patient is a 64 y.o. female seen for OT {Evaltx:95083} at [facility] {[place]:[facility]} [place] following admission on 4/6/2024  s/p Syncope. Please see above for comprehensive list of comorbidities and significant PMHx impacting functional performance.  Upon initial evaluation, pt appears to be performing below baseline functional status.   Occupational performance is affected by the following deficits: {generaldeficits:42343}. Personal/Environmental factors impacting D/C include: {personalfactors:41274}. Supporting factors include: {supportingfactors:39442} Patient would benefit from OT services within the acute care setting to maximize level of functional independence in the following areas {occupationalperformance:87118}.  From OT standpoint, recommendation at time of D/C would be {D/C:63568}.       The patient's raw score on the AM-PAC Daily Activity Inpatient Short Form is  . A raw score of {greater than/less than or equal to:39690} 19 suggests the patient may benefit from discharge to {home/post-acute rehab services:50184}. Please refer to the recommendation of the Occupational Therapist for safe discharge planning.      Mobility Plan as of 04/09/24    WBS: ***. Pt is {jpsassistlevels:49442} with {jpsassistivedevices:83873} to/from {jpsplaces:30969}. Encourage OOB for all meals.     Thank you,   Melly Hernandez

## 2024-04-09 NOTE — ASSESSMENT & PLAN NOTE
Continue amiodarone 100 daily   Continue toprol 25 daily with hold parameters for BP  Eliquis was placed on hold in the setting of GIB  Gastroenterology is okay with resuming eliquis and stopping IV heparin

## 2024-04-09 NOTE — CASE MANAGEMENT
Case Management Discharge Planning Note    Patient name Vesna COLE /S -01 MRN [id number]  : 1960 Date 2024       Current Admission Date: 2024  Current Admission Diagnosis:Syncope   Patient Active Problem List    Diagnosis Date Noted    Coffee ground emesis 2024    Type 2 diabetes mellitus, without long-term current use of insulin ([facility]) 2024    Syncope 2024    Guaiac positive stools 2024    S/P ICD (internal cardiac defibrillator) procedure 2024    Chronic low back pain 2024    HFrEF (heart failure with reduced ejection fraction) ([facility]) 2024    Ischemic cardiomyopathy 2024    Coronary artery disease involving native coronary artery of native heart 2024    S/P coronary artery stent placement 2024    ST elevation myocardial infarction involving left anterior descending (LAD) coronary artery ([facility]) 2024    Atrial flutter, paroxysmal ([facility]) 2024    Hyperlipemia 2024    New onset type 2 diabetes mellitus ([facility]) 2024    Tobacco abuse 2024    History of sustained ventricular tachycardia 2024    Symptoms of upper respiratory infection (URI) 2023    Bronchitis 2022    Back pain 2022    Sciatica of left side 2022      LOS (days): 2  Geometric Mean LOS (GMLOS) (days): 3  Days to GMLOS:0.6     OBJECTIVE:  Risk of Unplanned Readmission Score: 18.62         Current admission status: Inpatient   Preferred Pharmacy:   [facility]/pharmacy #[id number] [facility] - [address] [address]  Phone: [phone] Fax: [phone]    [facility]/pharmacy #[id number]  MICHAEL REINOSO - [address] [address]  Phone: [phone] Fax: [phone]    Primary Care Provider: Frannie Mancera DO    Primary Insurance: BLUE CROSS  Secondary Insurance: MEDICARE    DISCHARGE DETAILS:    Discharge planning discussed with:: Patient  Freedom  of Choice: Yes  Comments - Freedom of Choice: CM discussed discharge plans per care team recs, patient reports already going to OP therapy with [facility] so she'll continue with the agency.  CM contacted family/caregiver?: Yes  Were Treatment Team discharge recommendations reviewed with patient/caregiver?: Yes  Did patient/caregiver verbalize understanding of patient care needs?: Yes  Were patient/caregiver advised of the risks associated with not following Treatment Team discharge recommendations?: Yes    Contacts  Patient Contacts: Patient  Relationship to Patient:: Other (Comment) (Self)  Contact Method: In Person  Reason/Outcome: Discharge Planning    Requested Home Health Care         Is the patient interested in HHC at discharge?: No    DME Referral Provided  Referral made for DME?: No    Other Referral/Resources/Interventions Provided:  Interventions: Outpatient PT  Referral Comments: OP therapy recommended and patient already receiving services with [facility] and will continue at discharge.    Would you like to participate in our [facility] service program?  : No - Declined    Treatment Team Recommendation: Home  Discharge Destination Plan:: Home

## 2024-04-09 NOTE — PLAN OF CARE
Problem: PHYSICAL THERAPY ADULT  Goal: Performs mobility at highest level of function for planned discharge setting.  See evaluation for individualized goals.  Description: Treatment/Interventions: LE strengthening/ROM, Functional transfer training, Therapeutic exercise, Elevations, Patient/family training, Bed mobility, Equipment eval/education, Gait training, Spoke to nursing, Spoke to case management, OT          See flowsheet documentation for full assessment, interventions and recommendations.  Note: Prognosis: Fair  Problem List: Decreased strength, Decreased endurance, Impaired balance, Decreased mobility, Decreased cognition, Impaired judgement, Decreased safety awareness, Obesity, Decreased skin integrity  Assessment: Pt is a 64 y.o. female seen for PT evaluation s/p admit to [facility] on 4/6/2024. Pt was admitted with a primary dx of: syncope, head injury, upper GI bleed, coffee ground emesis.  PT now consulted for assessment of mobility and d/c needs. Pt with OOB to chair orders.  Pts current comorbidities and personal factors effecting treatment include: BMI, chronic heart failure with reduced ejection fraction, HLD, CAD. Pts current clinical presentation is Unstable/Unpredictable (high complexity) due to Ongoing medical management for primary dx, Decreased activity tolerance compared to baseline, Fall risk, Increased assistance needed from caregiver at current time, Ongoing telemetry monitoring. Prior to admission, pt was independent with use of SC. Upon evaluation, pt currently is requiring Modified Independent for bed mobility; Modified Independent for transfers and Supervision for ambulation 85 ft w/ IV pole. Pt presents at PT eval functioning below baseline and currently w/ overall mobility deficits 2* to: BLE weakness, impaired balance, gait deviations, decreased activity tolerance compared to baseline, decreased functional mobility tolerance compared to baseline, decreased safety  awareness, impaired judgement, fall risk, decreased cognition. Pt currently at a fall risk 2* to impairments listed above.  Pt will continue to benefit from skilled acute PT interventions to address stated impairments; to maximize functional mobility; for ongoing pt/ family training; and DME needs. At conclusion of PT session chair alarm engaged, all needs in reach, RN notified of session findings/recommendations, and pt returned back in recliner chair with phone and call bell within reach. Pt denies any further questions at this time. Recommend Level III (Minimum Resource Intensity)  upon hospital D/C.        Rehab Resource Intensity Level, PT: III (Minimum Resource Intensity)    See flowsheet documentation for full assessment.

## 2024-04-09 NOTE — PLAN OF CARE
Problem: OCCUPATIONAL THERAPY ADULT  Goal: Performs self-care activities at highest level of function for planned discharge setting.  See evaluation for individualized goals.  Description: Treatment Interventions:  (no IP OT needs warranted)          See flowsheet documentation for full assessment, interventions and recommendations.   Note: Limitation: Decreased high-level ADLs (dynamic balance.)  Prognosis: Good  Assessment: Patient is a 64 y.o. female seen for OT evaluation at [facility] following admission on 4/6/2024  s/p Syncope. Please see above for comprehensive list of comorbidities and significant PMHx impacting functional performance.  At baseline, pt is (I) with ADLs, light A with IADLs. No AD vs SPC for mobility. Upon initial evaluation, pt appears to be performing at / near functional status. Pt presents with the following deficits: endurance , higher level balance. However, no significant impact in occupational performance. Personal/Environmental factors impacting D/C include: (+) Hx of falls  and steps to enter/navigate the home. Supporting factors include: support system available and attitude towards recovery . No OT services warranted at this time. Recommending D/C to return to previous environment with social support once medically cleared. Will sign off, D/C OT. Please reconsult if further immediate skilled OT needs warranted.     Rehab Resource Intensity Level, OT: No post-acute rehabilitation needs     Melly Hernandez, OT

## 2024-04-09 NOTE — ASSESSMENT & PLAN NOTE
Noted TSH to be elevated at 57.6 on April 1.  Will repeat TSH now.  If elevated she should be initiated on Synthroid.   This will need to be followed up as an outpatient with labs in 4 weeks

## 2024-04-09 NOTE — PHYSICAL THERAPY NOTE
Physical Therapy Evaluation    Patient's Name: Vesna Langston    Admitting Diagnosis  Syncope and collapse [R55]  Head injury [S09.90XA]  Upper GI bleed [K92.2]  Coffee ground emesis [K92.0]    Problem List  Patient Active Problem List   Diagnosis    ST elevation myocardial infarction involving left anterior descending (LAD) coronary artery ([facility])    Atrial flutter, paroxysmal ([facility])    Hyperlipemia    New onset type 2 diabetes mellitus ([facility])    Tobacco abuse    Ischemic cardiomyopathy    Chronic low back pain    HFrEF (heart failure with reduced ejection fraction) ([facility])    S/P ICD (internal cardiac defibrillator) procedure    Back pain    Bronchitis    Sciatica of left side    Symptoms of upper respiratory infection (URI)    Syncope    Guaiac positive stools    History of sustained ventricular tachycardia    Coronary artery disease involving native coronary artery of native heart    S/P coronary artery stent placement    Coffee ground emesis    Type 2 diabetes mellitus, without long-term current use of insulin ([facility])       Past Medical History  Past Medical History:   Diagnosis Date    Acute respiratory insufficiency 03/22/2024    Allergic     Chronic HFrEF (heart failure with reduced ejection fraction) ([facility])     Coronary artery disease     COVID-19 virus infection 11/28/2022    Diabetes mellitus ([facility])     Hyperlipidemia     Hypoglycemia     ICD (implantable cardioverter-defibrillator) in place     Ischemic cardiomyopathy     Lactic acidosis 03/22/2024    Tobacco abuse        Past Surgical History  Past Surgical History:   Procedure Laterality Date    ADENOIDECTOMY      CARDIAC CATHETERIZATION N/A 3/22/2024    Procedure: Cardiac pci;  Surgeon: Gabriel Myers MD;  [facility]: [facility];  Service: Cardiology    CARDIAC CATHETERIZATION N/A 3/22/2024    Procedure: Cardiac Coronary Angiogram;  Surgeon: Gabriel Myers MD;  [facility];  Service: Cardiology    CARDIAC CATHETERIZATION N/A 3/22/2024     Procedure: Cardiac PCI AMI;  Surgeon: Gabriel Myers MD;  [facility]: [facility];  Service: Cardiology    CARDIAC CATHETERIZATION N/A 3/22/2024    Procedure: Cardiac IVUS;  Surgeon: Gabriel Myers MD;  [facility];  Service: Cardiology    CARDIAC ELECTROPHYSIOLOGY PROCEDURE N/A 3/27/2024    Procedure: Cardiac icd implant;  Surgeon: Jeffy Lama MD;  [facility]: [facility];  Service: Cardiology     SECTION      ECTOPIC PREGNANCY SURGERY      SEPTOPLASTY      TONSILLECTOMY          24 0951   Note Type   Note type Evaluation   Pain Assessment   Pain Assessment Tool 0-10   Pain Score No Pain   Restrictions/Precautions   Weight Bearing Precautions Per Order No   Other Precautions Chair Alarm;Bed Alarm;Fall Risk;Multiple lines   Home Living   Type of Home House   Home Layout Multi-level  (0 AYESHA, 6+6 stairs to main level)   Bathroom Shower/Tub Walk-in shower   Bathroom Toilet Standard   Home Equipment Walker;Cane   Prior Function   Lives With Daughter   Receives Help From Outpatient therapy   Falls in the last 6 months 5 to 10  (per pt x10 in total, x1 leading to admission)   Comments per pt utilizes SPC in community, no AD within home   Cognition   Orientation Level Oriented X4   Following Commands Follows all commands and directions without difficulty   Comments pt ID by wristband, name and    Subjective   Subjective pt agreeable to PT evaluation   RLE Assessment   RLE Assessment X  (3+/5 proximally)   LLE Assessment   LLE Assessment X  (2/5 to 2+/5 DF/PF, 3+/5 proximally)   Bed Mobility   Supine to Sit 6  Modified independent   Additional items HOB elevated   Additional Comments sits EOB indpendently   Transfers   Sit to Stand 6  Modified independent   Additional items Armrests   Stand to Sit 6  Modified independent   Additional items Armrests   Additional Comments denies light headedness/dizziness with positional change, safe mechanics   Ambulation/Elevation   Gait pattern  Decreased foot clearance;Short stride;Decreased hip extension;Decreased heel strike   Gait Assistance 5  Supervision   Additional items Assist x 1;Verbal cues   Assistive Device Other (Comment)  (IV pole)   Distance 75'x1, 85'x1   Stair Management Assistance 5  Supervision   Additional items Assist x 1;Verbal cues   Stair Management Technique One rail R;Step to pattern   Number of Stairs 3   Ambulation/Elevation Additional Comments safe mechanics, no significant deviaitons, no sway noted   Balance   Static Sitting Good   Dynamic Sitting Fair +   Static Standing Fair +   Dynamic Standing Fair   Ambulatory Fair   Activity Tolerance   Activity Tolerance Patient tolerated treatment well   Medical Staff Made Aware care coordinated with OT, spoke with CM   Nurse Made Aware LOUANN nye pre/post   Assessment   Prognosis Fair   Problem List Decreased strength;Decreased endurance;Impaired balance;Decreased mobility;Decreased cognition;Impaired judgement;Decreased safety awareness;Obesity;Decreased skin integrity   Assessment Pt is a 64 y.o. female seen for PT evaluation s/p admit to [facility] on 4/6/2024. Pt was admitted with a primary dx of: syncope, head injury, upper GI bleed, coffee ground emesis.  PT now consulted for assessment of mobility and d/c needs. Pt with OOB to chair orders.  Pts current comorbidities and personal factors effecting treatment include: BMI, chronic heart failure with reduced ejection fraction, HLD, CAD. Pts current clinical presentation is Unstable/Unpredictable (high complexity) due to Ongoing medical management for primary dx, Decreased activity tolerance compared to baseline, Fall risk, Increased assistance needed from caregiver at current time, Ongoing telemetry monitoring. Prior to admission, pt was independent with use of SC. Upon evaluation, pt currently is requiring Modified Independent for bed mobility; Modified Independent for transfers and Supervision for ambulation 85 ft w/ IV  pole. Pt presents at PT eval functioning below baseline and currently w/ overall mobility deficits 2* to: BLE weakness, impaired balance, gait deviations, decreased activity tolerance compared to baseline, decreased functional mobility tolerance compared to baseline, decreased safety awareness, impaired judgement, fall risk, decreased cognition. Pt currently at a fall risk 2* to impairments listed above.  Pt will continue to benefit from skilled acute PT interventions to address stated impairments; to maximize functional mobility; for ongoing pt/ family training; and DME needs. At conclusion of PT session chair alarm engaged, all needs in reach, RN notified of session findings/recommendations, and pt returned back in recliner chair with phone and call bell within reach. Pt denies any further questions at this time. Recommend Level III (Minimum Resource Intensity)  upon hospital D/C.   Goals   Patient Goals to go home   STG Expiration Date 04/23/24   Short Term Goal #1 In 14 days pt will be able to: 1. Demonstrate ability to perform all aspects of bed mobility independently to improve functional safety.  2. Perform functional transfers independently to facilitate safe return to previous living environment.  3.  Ambulate at least 500 ft with LRAD independently with stable vitals to improve safety with household distances and reduce fall risk.  4. Improve LE strength grades by 1 to increase ease of functional mobility with transfers and gait. 5. Pt will demonstrate improved balance by one grade in order to decrease risk of falls. 6. Climb at least 6 steps with 1 HR independently to simulate entrance to home.   PT Treatment Day 0   Plan   Treatment/Interventions LE strengthening/ROM;Functional transfer training;Therapeutic exercise;Elevations;Patient/family training;Bed mobility;Equipment eval/education;Gait training;Spoke to nursing;Spoke to case management;OT   PT Frequency 1-2x/wk   Discharge Recommendation   Rehab  Resource Intensity Level, PT III (Minimum Resource Intensity)   AM-PAC Basic Mobility Inpatient   Turning in Flat Bed Without Bedrails 3   Lying on Back to Sitting on Edge of Flat Bed Without Bedrails 3   Moving Bed to Chair 4   Standing Up From Chair Using Arms 4   Walk in Room 4   Climb 3-5 Stairs With Railing 4   Basic Mobility Inpatient Raw Score 22   Basic Mobility Standardized Score 47.4   [facility] Highest Level Of Mobility   JH-HLM Goal 7: Walk 25 feet or more   JH-HLM Achieved 7: Walk 25 feet or more   End of Consult   Patient Position at End of Consult Bedside chair;Bed/Chair alarm activated;All needs within reach   The patient's AM-PAC Basic Mobility Inpatient Short Form Raw Score is 22. A Raw score of greater than 16 suggests the patient may benefit from discharge to home. Please also refer to the recommendation of the Physical Therapist for safe discharge planning.  Diaz Delgado PT

## 2024-04-09 NOTE — ASSESSMENT & PLAN NOTE
This was placed due to sustained vtach during prior admission during when patient found to have STEMI  Patient inquiring about removing the dressing.  Would prefer she follow-up with her EP specialist tomorrow as planned

## 2024-04-09 NOTE — DISCHARGE INSTR - AVS FIRST PAGE
Dear Vesna Langston,     It was our pleasure to care for you here at [facility].  It is our hope that we were always able to exceed the expected standards for your care during your stay.  You were hospitalized due to GI bleeding.  You were cared for on the second floor by Ana Lilia Dueñas PA-C under the service of Samantha Austin MD with the [facility] who covers for your primary care physician (PCP), Frannie Mancera DO, while you were hospitalized.  If you have any questions or concerns related to this hospitalization, you may contact us at .  For follow up as well as any medication refills, we recommend that you follow up with your primary care physician.  A registered nurse will reach out to you by phone within a few days after your discharge to answer any additional questions that you may have after going home.  However, at this time we provide for you here, the most important instructions / recommendations at discharge:     Notable Medication Adjustments -   Protonix twice a day  Bowel regimen daily  Start taking Synthroid 25 mcg daily  Talk to your heart doctor about amiodarone which can affect your thyroid function  Decrease your metoprolol dose and take it at night.  Do not take your Lasix or Entresto for now because your blood pressure was too low  Testing Required after Discharge -   Cbc in 1 week  Thyroid levels should be repeated within 4 weeks  ** Please contact your PCP to request testing orders for any of the testing recommended here **  Important follow up information -   Cardiology  Gastroenterology  Family doctor  Other Instructions -   Monitor for bleeding  You may not take any NSAID medications  Please review this entire after visit summary as additional general instructions including medication list, appointments, activity, diet, any pertinent wound care, and other additional recommendations from your care team that may be  provided for you.      Sincerely,     Ana Lilia Dueñas PA-C

## 2024-04-09 NOTE — PROGRESS NOTES
[facility]  Progress Note  Name: Vesna Langston I  MRN: [id number]  Unit/Bed#: S -01 I Date of Admission: 4/6/2024   Date of Service: 4/9/2024 I Hospital Day: 2    Assessment/Plan   * Syncope  Assessment & Plan  Admitted for syncope last week felt to be 2/2 vasovagal response   Presented with 1 day history of dizziness and lightheadedness --patient has been significantly hypotensive and with ABLA  Trauma w/u negative   Hold parameters on beta-blocker    Coffee ground emesis  Assessment & Plan  recent NATHAN on DAPT w ASA/Plavix as well as on Eliquis for Atrial flutter presented with tarry/coffee ground emesis as well as 2 episodes of melena.   Hgb drop 14 ->9.7-->8.4  Status post reversal w Kcentra   EGD findings of deep erosion on patient's distal esophagus/GE junction, with fresh white-based stomach ulcer noted in patient's antrum under the inflamed fold; another small pressure ulceration was noted in the adjacent area, but no evidence of any active bleeding  Protonix drip was added 4/7, change to PO PPI BID now  CT high volume abdominal scan: Suspicion for gastric bleeding.      Elevated TSH  Assessment & Plan  Noted TSH to be elevated at 57.6 on April 1.  Will repeat TSH now.  If elevated she should be initiated on Synthroid.   This will need to be followed up as an outpatient with labs in 4 weeks    Type 2 diabetes mellitus, without long-term current use of insulin (HCC)  Assessment & Plan  Lab Results   Component Value Date    HGBA1C 7.9 (H) 03/22/2024     Recent Labs     04/08/24  1553 04/08/24  2101 04/09/24  0742 04/09/24  1122   POCGLU 120 118 96 100     Blood Sugar Average: Last 72 hrs:  (P) 127.4NEW diagnosis in march   Not on any antidiabetic medications   Blood sugars are stable at this time without any meds    S/P ICD (internal cardiac defibrillator) procedure  Assessment & Plan  This was placed due to sustained vtach during prior admission during when patient found to have  STEMI  Patient inquiring about removing the dressing.  Would prefer she follow-up with her EP specialist tomorrow as planned    HFrEF (heart failure with reduced ejection fraction) ([facility])  Assessment & Plan  Wt Readings from Last 3 Encounters:   04/09/24 95.1 kg (209 lb 10.5 oz)   04/02/24 94.7 kg (208 lb 11.2 oz)   04/01/24 90 kg (198 lb 6.6 oz)   EF 35% per last TTE 2/2 ischemic cardiomyopathy w ICD in place  O/p GDMT - entresto, toprol   Diuretics: lasix 20 daily   Low sodium / fluid resctriction   Trend daily weights, I/O  Holding entresto, and lasix in the setting of hypotension 2/2 ABLA  Currently euvolemic appearing    Atrial flutter, paroxysmal ([facility])  Assessment & Plan  Continue amiodarone 100 daily   Continue toprol 25 daily with hold parameters for BP  Eliquis was placed on hold in the setting of GIB  Gastroenterology is okay with resuming eliquis and stopping IV heparin    ST elevation myocardial infarction involving left anterior descending (LAD) coronary artery ([facility])  Assessment & Plan  With admission on 3/22/24  Continue follow-up with cardiology.  Continue Plavix           VTE Pharmacologic Prophylaxis: VTE Score: 3 resume eliquis    Mobility:   Basic Mobility Inpatient Raw Score: 23  JH-HLM Goal: 7: Walk 25 feet or more  JH-HLM Achieved: 7: Walk 25 feet or more  JH-HLM Goal achieved. Continue to encourage appropriate mobility.    Patient Centered Rounds: I performed bedside rounds with nursing staff today.   Discussions with Specialists or Other Care Team Provider: GI    Education and Discussions with Family / Patient: Updated  (daughter) via phone.    Total Time Spent on Date of Encounter in care of patient: 35 mins. This time was spent on one or more of the following: performing physical exam; counseling and coordination of care; obtaining or reviewing history; documenting in the medical record; reviewing/ordering tests, medications or procedures; communicating with other healthcare  "professionals and discussing with patient's family/caregivers.    Current Length of Stay: 2 day(s)  Current Patient Status: Inpatient   Certification Statement: The patient will continue to require additional inpatient hospital stay due to need for monitoring after resuming blood thinners  Discharge Plan: Anticipate discharge tomorrow to home.    Code Status: Level 1 - Full Code    Subjective:   Patient requesting to shower and take off ICD bandage.  States she has not had any bowel movements for several days and therefore no bleeding.  Feels tired --\"not my best\" and easily runs out of energy but no abdominal pain, chest pain, pressure, shortness of breath, lightheadedness    Objective:     Vitals:   Temp (24hrs), Av.2 °F (37.3 °C), Min:98.3 °F (36.8 °C), Max:100.9 °F (38.3 °C)    Temp:  [98.3 °F (36.8 °C)-100.9 °F (38.3 °C)] 98.3 °F (36.8 °C)  HR:  [78-94] 78  Resp:  [14-18] 18  BP: ()/(43-72) 96/67  SpO2:  [92 %-99 %] 96 %  Body mass index is 31.88 kg/m².     Input and Output Summary (last 24 hours):     Intake/Output Summary (Last 24 hours) at 2024 1353  Last data filed at 2024 1615  Gross per 24 hour   Intake 206.67 ml   Output --   Net 206.67 ml       Physical Exam:   Physical Exam  Vitals reviewed.   Constitutional:       General: She is not in acute distress.     Appearance: Normal appearance. She is obese. She is not ill-appearing, toxic-appearing or diaphoretic.   Eyes:      General: No scleral icterus.        Right eye: No discharge.         Left eye: No discharge.      Conjunctiva/sclera: Conjunctivae normal.   Cardiovascular:      Rate and Rhythm: Normal rate and regular rhythm.      Heart sounds: No murmur heard.  Pulmonary:      Effort: No respiratory distress.      Breath sounds: No stridor. No wheezing, rhonchi or rales.   Abdominal:      General: There is no distension.      Tenderness: There is no abdominal tenderness. There is no guarding.   Musculoskeletal:      Right lower " leg: No edema.      Left lower leg: No edema.   Skin:     General: Skin is warm and dry.      Coloration: Skin is not jaundiced or pale.      Findings: No bruising, erythema, lesion or rash.   Neurological:      General: No focal deficit present.      Mental Status: She is alert. Mental status is at baseline.      Comments: Awake alert interactive, patient is well-appearing, seen sitting on edge of bed   Psychiatric:         Mood and Affect: Mood normal.          Additional Data:     Labs:  Results from last 7 days   Lab Units 04/09/24  0539 04/08/24  1350 04/08/24  0839   WBC Thousand/uL 8.56  --  11.05*   HEMOGLOBIN g/dL 8.4*   < > 7.7*   HEMATOCRIT % 26.1*  --  24.0*   PLATELETS Thousands/uL 215  --  223   SEGS PCT %  --   --  64   LYMPHO PCT %  --   --  22   MONO PCT %  --   --  7   EOS PCT %  --   --  6    < > = values in this interval not displayed.     Results from last 7 days   Lab Units 04/09/24  0539 04/08/24  0600 04/07/24  0634   SODIUM mmol/L 137   < > 138   POTASSIUM mmol/L 3.8   < > 4.0   CHLORIDE mmol/L 106   < > 105   CO2 mmol/L 26   < > 25   BUN mg/dL 10   < > 38*   CREATININE mg/dL 0.79   < > 0.72   ANION GAP mmol/L 5   < > 8   CALCIUM mg/dL 8.1*   < > 8.5   ALBUMIN g/dL  --   --  3.4*   TOTAL BILIRUBIN mg/dL  --   --  0.46   ALK PHOS U/L  --   --  30*   ALT U/L  --   --  24   AST U/L  --   --  19   GLUCOSE RANDOM mg/dL 98   < > 142*    < > = values in this interval not displayed.     Results from last 7 days   Lab Units 04/07/24  1434   INR  1.13     Results from last 7 days   Lab Units 04/09/24  1122 04/09/24  0742 04/08/24  2101 04/08/24  1553 04/08/24  1101 04/08/24  0815 04/07/24  2217 04/07/24  1626 04/07/24  1117 04/07/24  0731   POC GLUCOSE mg/dl 100 96 118 120 150* 103 112 185* 143* 147*         Results from last 7 days   Lab Units 04/07/24  0009   LACTIC ACID mmol/L 1.7       Lines/Drains:  Invasive Devices       Peripheral Intravenous Line  Duration             Long-Dwell Peripheral IV  (Midline) 24 Right Basilic 1 day    Peripheral IV 24 Left;Upper;Ventral (anterior) Arm 1 day                      Telemetry:  Telemetry Orders (From admission, onward)               24 Hour Telemetry Monitoring  Continuous x 24 Hours (Telem)           Question:  Reason for 24 Hour Telemetry  Answer:  Syncope suspected to be cardiac in origin                     Telemetry Reviewed: Normal Sinus Rhythm  Indication for Continued Telemetry Use: d/c             Imaging:     Recent Cultures (last 7 days):         Last 24 Hours Medication List:   Current Facility-Administered Medications   Medication Dose Route Frequency Provider Last Rate    acetaminophen  650 mg Oral Q8H PRN Laquita Liang MD      amiodarone  200 mg Oral Daily Jasmit Kaelyn-Kals, DO      apixaban  5 mg Oral BID Ana Lilia Dueñas PA-C      atorvastatin  80 mg Oral QPM Jasmit Kaelyn-Kals, DO      clopidogrel  75 mg Oral Daily Hollis Stokes MD      gabapentin  100 mg Oral BID Jasmit Kaelyn-Kals, DO      HYDROcodone-acetaminophen  1 tablet Oral Q6H PRN Jasmit Kaelyn-Kals, DO      insulin lispro  1-5 Units Subcutaneous 4x Daily (AC & HS) Jasmit Kaelyn-Kals, DO      meclizine  25 mg Oral Q8H PRN Jasmit Kaelyn-Kals, DO      metoprolol succinate  25 mg Oral Daily Jasmit Kaelyn-Kals, DO      pantoprazole  40 mg Oral BID AC Ana Lilia Dueñas PA-C      polyethylene glycol  17 g Oral Daily Ana Lilia Dueñas PA-C      sucralfate  1 g Oral 4x Daily (AC & HS) Jessa Ragsdale MD          Today, Patient Was Seen By: Ana Lilia Dueñas PA-C    **Please Note: This note may have been constructed using a voice recognition system.**

## 2024-04-09 NOTE — ASSESSMENT & PLAN NOTE
Admitted for syncope last week felt to be 2/2 vasovagal response   Presented with 1 day history of dizziness and lightheadedness --patient has been significantly hypotensive and with ABLA  Trauma w/u negative   Hold parameters on beta-blocker

## 2024-04-09 NOTE — PLAN OF CARE
Problem: PAIN - ADULT  Goal: Verbalizes/displays adequate comfort level or baseline comfort level  Description: Interventions:  - Encourage patient to monitor pain and request assistance  - Assess pain using appropriate pain scale  - Administer analgesics based on type and severity of pain and evaluate response  - Implement non-pharmacological measures as appropriate and evaluate response  - Consider cultural and social influences on pain and pain management  - Notify physician/advanced practitioner if interventions unsuccessful or patient reports new pain  Outcome: Progressing     Problem: INFECTION - ADULT  Goal: Absence or prevention of progression during hospitalization  Description: INTERVENTIONS:  - Assess and monitor for signs and symptoms of infection  - Monitor lab/diagnostic results  - Monitor all insertion sites, i.e. indwelling lines, tubes, and drains  - Monitor endotracheal if appropriate and nasal secretions for changes in amount and color  - [place] appropriate cooling/warming therapies per order  - Administer medications as ordered  - Instruct and encourage patient and family to use good hand hygiene technique  - Identify and instruct in appropriate isolation precautions for identified infection/condition  Outcome: Progressing  Goal: Absence of fever/infection during neutropenic period  Description: INTERVENTIONS:  - Monitor WBC    Outcome: Progressing

## 2024-04-09 NOTE — OCCUPATIONAL THERAPY NOTE
Occupational Therapy Evaluation     Patient Name: Vesna Langston  Today's Date: 4/9/2024  Problem List  Principal Problem:    Syncope  Active Problems:    ST elevation myocardial infarction involving left anterior descending (LAD) coronary artery ([facility])    Atrial flutter, paroxysmal ([facility])    Tobacco abuse    HFrEF (heart failure with reduced ejection fraction) ([facility])    S/P ICD (internal cardiac defibrillator) procedure    Sciatica of left side    Coronary artery disease involving native coronary artery of native heart    Coffee ground emesis    Type 2 diabetes mellitus, without long-term current use of insulin ([facility])    Past Medical History  Past Medical History:   Diagnosis Date    Acute respiratory insufficiency 03/22/2024    Allergic     Chronic HFrEF (heart failure with reduced ejection fraction) ([facility])     Coronary artery disease     COVID-19 virus infection 11/28/2022    Diabetes mellitus ([facility])     Hyperlipidemia     Hypoglycemia     ICD (implantable cardioverter-defibrillator) in place     Ischemic cardiomyopathy     Lactic acidosis 03/22/2024    Tobacco abuse      Past Surgical History  Past Surgical History:   Procedure Laterality Date    ADENOIDECTOMY      CARDIAC CATHETERIZATION N/A 3/22/2024    Procedure: Cardiac pci;  Surgeon: Gabriel Myers MD;  Location: [facility];  Service: Cardiology    CARDIAC CATHETERIZATION N/A 3/22/2024    Procedure: Cardiac Coronary Angiogram;  Surgeon: Gabriel Myers MD;  [facility]: [facility];  Service: Cardiology    CARDIAC CATHETERIZATION N/A 3/22/2024    Procedure: Cardiac PCI AMI;  Surgeon: Gabriel Myers MD;  [facility]: [facility];  Service: Cardiology    CARDIAC CATHETERIZATION N/A 3/22/2024    Procedure: Cardiac IVUS;  Surgeon: Gabriel Myers MD;  [facility]: [facility];  Service: Cardiology    CARDIAC ELECTROPHYSIOLOGY PROCEDURE N/A 3/27/2024    Procedure: Cardiac icd implant;  Surgeon: Jeffy Lama MD;  [facility]: [facility];  Service:  "Cardiology     SECTION      ECTOPIC PREGNANCY SURGERY      SEPTOPLASTY      TONSILLECTOMY               24 0937   OT Last Visit   OT Visit Date 24   Note Type   Note type Evaluation   Pain Assessment   Pain Assessment Tool 0-10   Pain Score No Pain   Pain Location/Orientation Orientation: Lower;Location: Back  (chronic LBP, but denies at this time)   Restrictions/Precautions   Weight Bearing Precautions Per Order No   Other Precautions Chair Alarm;Bed Alarm;Fall Risk;Multiple lines  (IV , masimo, PCI placement 3/27)   Home Living   Type of Home House  (split level)   Home Layout Multi-level;Access  (0 AYESHA through back. 6+6 AYESHA)   Bathroom Shower/Tub Walk-in shower   Bathroom Toilet Standard   Bathroom Equipment   (has shower stool at her house she can use PRN)   Bathroom Accessibility Accessible   Home Equipment Cane;Walker   Additional Comments has been staying at daughters home since last hospital stay. set up provided above. sleeps on first floor, but negotiates steps for both levels of floor without difficulty   Prior Function   Level of [place] Independent with ADLs;Independent with functional mobility   Lives With Daughter  (temporary lives with daughter)   Receives Help From Outpatient therapy  (active c OP OT.)   IADLs   (has not been driving d/t medical reasons, reports gets cleared to drive on Fri. Shares home management tasks with daughter)   Falls in the last 6 months 5 to 10  (10 per pt,. but reports only 1 since last D/C (reason for admission))   Vocational   (Works at [employer], currently on workers comp)   Comments uses SPC for community mobility, no AD within the home \"I just take my time\" .   Lifestyle   Autonomy At baseline, pt is (I) with ADL/IADLs. Has been staying with her daughter recently. + falls. no AD vs SPC   Reciprocal Relationships daughter   Service to Others works at [employer] but on workers comp   General   Additional Pertinent History Pt admitted d/t syncope " "event c head strike- suspected d/t vasovagal.  history of recent STEMI a few weeks ago, status post PCI  implanted by Dr. Lama on 3/27/2024   Family/Caregiver Present No   Subjective   Subjective \"I can do it I just take my time\"   ADL   Eating Assistance 7  Independent   Grooming Assistance 6  Modified Independent   UB Bathing Assistance 6  Modified Independent   LB Bathing Assistance 5  Supervision/Setup   UB Dressing Assistance 6  Modified independent   LB Dressing Assistance 6  Modified independent   LB Dressing Deficit   (doffs/dons socks in recliner (I). declines pants)   Toileting Assistance  5  Supervision/Setup   Functional Assistance 5  Supervision/Setup   Additional Comments Did not observe eating, grooming, UB bathing, LB bathing, UB dressing, and toileting at time of evaluation, with use of clinical reasoning, pt's performance throughout evaluation indicates that pt may be able to perform these tasks at the levels listed above   Bed Mobility   Supine to Sit 6  Modified independent   Additional items HOB elevated   Sit to Supine   (seated OOB in recliner at end of session)   Additional Comments Denies lightheaded/dizziness c positional changes   Transfers   Sit to Stand 6  Modified independent   Additional items Armrests   Stand to Sit 6  Modified independent   Additional items Armrests   Additional Comments safe body mechanics demonstrated during mobility, no LOB or AD used   Functional Mobility   Functional Mobility 5  Supervision   Additional Comments community distance within hallway, denies lightheaded/dizziness.   Additional items   (self steadying on IV pole)   Balance   Static Sitting Good   Dynamic Sitting Fair +   Static Standing Fair +   Dynamic Standing Fair   Activity Tolerance   Activity Tolerance Patient tolerated treatment well   Medical Staff Made Aware Care coordination c PT Rah   Nurse Made Aware LOUANN Stephenson pre/post   RUE Assessment   RUE Assessment WFL  (MMT grossly 4+/5 based on " functional assessment)   LUE Assessment   LUE Assessment WFL  (MMT grossly  4+/5 based on functional assessment, formal MMT deferred 2/2 PCI placement)   Hand Function   Gross Motor Coordination Functional   Fine Motor Coordination Functional   Sensation   Light Touch Partial deficits in the LLE   Cognition   Overall Cognitive Status WFL   Arousal/Participation Alert;Cooperative   Attention Within functional limits   Orientation Level Oriented X4   Memory Within functional limits   Following Commands Follows all commands and directions without difficulty   Comments Pt agreeable to OT session, pleasant   Assessment   Limitation Decreased high-level ADLs  (dynamic balance.)   Prognosis Good   Assessment Patient is a 64 y.o. female seen for OT evaluation at [facility] following admission on 4/6/2024  s/p Syncope. Please see above for comprehensive list of comorbidities and significant PMHx impacting functional performance.  At baseline, pt is (I) with ADLs, light A with IADLs. No AD vs SPC for mobility. Upon initial evaluation, pt appears to be performing at / near functional status. Pt presents with the following deficits: endurance , higher level balance. However, no significant impact in occupational performance. Personal/Environmental factors impacting D/C include: (+) Hx of falls  and steps to enter/navigate the home. Supporting factors include: support system available and attitude towards recovery . No OT services warranted at this time. Recommending D/C to return to previous environment with social support once medically cleared. Will sign off, D/C OT. Please reconsult if further immediate skilled OT needs warranted.   Goals   Patient Goals to return home and continue OP PT   Plan   Treatment Interventions   (no IP OT needs warranted)   OT Frequency Eval only  (D/C IP OT)   Discharge Recommendation   Rehab Resource Intensity Level, OT No post-acute rehabilitation needs   Additional Comments  The  patient's raw score on the AM-PAC Daily Activity Inpatient Short Form is 22. A raw score of greater than or equal to 19 suggests the patient may benefit from discharge to home. Please refer to the recommendation of the Occupational Therapist for safe discharge planning.   AM-PAC Daily Activity Inpatient   Lower Body Dressing 3   Bathing 3   Toileting 4   Upper Body Dressing 4   Grooming 4   Eating 4   Daily Activity Raw Score 22   Daily Activity Standardized Score (Calc for Raw Score >=11) 47.1   AM-PAC Applied Cognition Inpatient   Following a Speech/Presentation 4   Understanding Ordinary Conversation 4   Taking Medications 4   Remembering Where Things Are Placed or Put Away 4   Remembering List of 4-5 Errands 4   Taking Care of Complicated Tasks 4   Applied Cognition Raw Score 24   Applied Cognition Standardized Score 62.21   End of Consult   Education Provided Yes   Patient Position at End of Consult Bedside chair;All needs within reach;Bed/Chair alarm activated   Nurse Communication Nurse aware of consult   Melly Hernandez, OT

## 2024-04-10 ENCOUNTER — TELEPHONE (OUTPATIENT)
Age: 64
End: 2024-04-10

## 2024-04-10 VITALS
HEART RATE: 107 BPM | WEIGHT: 205 LBS | OXYGEN SATURATION: 100 % | TEMPERATURE: 98 F | BODY MASS INDEX: 31.17 KG/M2 | RESPIRATION RATE: 16 BRPM | SYSTOLIC BLOOD PRESSURE: 116 MMHG | DIASTOLIC BLOOD PRESSURE: 49 MMHG

## 2024-04-10 PROBLEM — K59.00 CONSTIPATION: Status: ACTIVE | Noted: 2024-04-10

## 2024-04-10 LAB
GLUCOSE SERPL-MCNC: 154 MG/DL (ref 65–140)
GLUCOSE SERPL-MCNC: 90 MG/DL (ref 65–140)
HCT VFR BLD AUTO: 27 % (ref 34.8–46.1)
HGB BLD-MCNC: 8.7 G/DL (ref 11.5–15.4)
T4 FREE SERPL-MCNC: 0.41 NG/DL (ref 0.61–1.12)
TSH SERPL DL<=0.05 MIU/L-ACNC: 24.13 UIU/ML (ref 0.45–4.5)

## 2024-04-10 PROCEDURE — 84443 ASSAY THYROID STIM HORMONE: CPT | Performed by: PHYSICIAN ASSISTANT

## 2024-04-10 PROCEDURE — 85018 HEMOGLOBIN: CPT | Performed by: PHYSICIAN ASSISTANT

## 2024-04-10 PROCEDURE — 99239 HOSP IP/OBS DSCHRG MGMT >30: CPT | Performed by: PHYSICIAN ASSISTANT

## 2024-04-10 PROCEDURE — 84439 ASSAY OF FREE THYROXINE: CPT | Performed by: PHYSICIAN ASSISTANT

## 2024-04-10 PROCEDURE — 85014 HEMATOCRIT: CPT | Performed by: PHYSICIAN ASSISTANT

## 2024-04-10 PROCEDURE — 82948 REAGENT STRIP/BLOOD GLUCOSE: CPT

## 2024-04-10 RX ORDER — SODIUM PHOSPHATE,MONO-DIBASIC 19G-7G/118
1 ENEMA (ML) RECTAL ONCE
Status: COMPLETED | OUTPATIENT
Start: 2024-04-10 | End: 2024-04-10

## 2024-04-10 RX ORDER — SUCRALFATE 1 G/1
1 TABLET ORAL
Qty: 120 TABLET | Refills: 0 | Status: SHIPPED | OUTPATIENT
Start: 2024-04-10 | End: 2024-04-10

## 2024-04-10 RX ORDER — POLYETHYLENE GLYCOL 3350 17 G/17G
17 POWDER, FOR SOLUTION ORAL DAILY
Start: 2024-04-10 | End: 2024-04-10

## 2024-04-10 RX ORDER — AMOXICILLIN 250 MG
2 CAPSULE ORAL 2 TIMES DAILY
Qty: 30 TABLET | Refills: 0 | Status: SHIPPED | OUTPATIENT
Start: 2024-04-10

## 2024-04-10 RX ORDER — BISACODYL 10 MG
10 SUPPOSITORY, RECTAL RECTAL DAILY PRN
Status: DISCONTINUED | OUTPATIENT
Start: 2024-04-10 | End: 2024-04-10 | Stop reason: HOSPADM

## 2024-04-10 RX ORDER — POLYETHYLENE GLYCOL 3350 17 G/17G
17 POWDER, FOR SOLUTION ORAL DAILY
Start: 2024-04-10

## 2024-04-10 RX ORDER — POLYETHYLENE GLYCOL 3350 17 G/17G
17 POWDER, FOR SOLUTION ORAL DAILY
Qty: 30 EACH | Refills: 0 | Status: CANCELLED | OUTPATIENT
Start: 2024-04-11

## 2024-04-10 RX ORDER — AMOXICILLIN 250 MG
2 CAPSULE ORAL 2 TIMES DAILY
Status: DISCONTINUED | OUTPATIENT
Start: 2024-04-10 | End: 2024-04-10 | Stop reason: HOSPADM

## 2024-04-10 RX ORDER — PANTOPRAZOLE SODIUM 40 MG/1
40 TABLET, DELAYED RELEASE ORAL
Qty: 60 TABLET | Refills: 0 | Status: SHIPPED | OUTPATIENT
Start: 2024-04-10 | End: 2024-04-10

## 2024-04-10 RX ORDER — POLYETHYLENE GLYCOL 3350 17 G/17G
17 POWDER, FOR SOLUTION ORAL 2 TIMES DAILY
Status: DISCONTINUED | OUTPATIENT
Start: 2024-04-10 | End: 2024-04-10 | Stop reason: HOSPADM

## 2024-04-10 RX ORDER — SUCRALFATE 1 G/1
1 TABLET ORAL
Qty: 120 TABLET | Refills: 0 | Status: SHIPPED | OUTPATIENT
Start: 2024-04-10

## 2024-04-10 RX ORDER — AMIODARONE HYDROCHLORIDE 200 MG/1
200 TABLET ORAL DAILY
Start: 2024-04-11

## 2024-04-10 RX ORDER — METOPROLOL SUCCINATE 25 MG/1
12.5 TABLET, EXTENDED RELEASE ORAL
Status: DISCONTINUED | OUTPATIENT
Start: 2024-04-10 | End: 2024-04-10 | Stop reason: HOSPADM

## 2024-04-10 RX ORDER — METOPROLOL SUCCINATE 25 MG/1
12.5 TABLET, EXTENDED RELEASE ORAL
Start: 2024-04-10

## 2024-04-10 RX ORDER — LEVOTHYROXINE SODIUM 0.03 MG/1
25 TABLET ORAL DAILY
Qty: 30 TABLET | Refills: 0 | Status: SHIPPED | OUTPATIENT
Start: 2024-04-10 | End: 2024-04-10

## 2024-04-10 RX ORDER — LEVOTHYROXINE SODIUM 0.03 MG/1
25 TABLET ORAL DAILY
Qty: 30 TABLET | Refills: 0 | Status: SHIPPED | OUTPATIENT
Start: 2024-04-10

## 2024-04-10 RX ORDER — PANTOPRAZOLE SODIUM 40 MG/1
40 TABLET, DELAYED RELEASE ORAL
Qty: 60 TABLET | Refills: 0 | Status: SHIPPED | OUTPATIENT
Start: 2024-04-10

## 2024-04-10 RX ORDER — AMOXICILLIN 250 MG
2 CAPSULE ORAL 2 TIMES DAILY
Qty: 30 TABLET | Refills: 0 | Status: SHIPPED | OUTPATIENT
Start: 2024-04-10 | End: 2024-04-10

## 2024-04-10 RX ADMIN — SUCRALFATE 1 G: 1 TABLET ORAL at 05:35

## 2024-04-10 RX ADMIN — SODIUM PHOSPHATE, DIBASIC AND SODIUM PHOSPHATE, MONOBASIC 1 ENEMA: 7; 19 ENEMA RECTAL at 12:33

## 2024-04-10 RX ADMIN — ACETAMINOPHEN 650 MG: 325 TABLET, FILM COATED ORAL at 00:06

## 2024-04-10 RX ADMIN — METOPROLOL SUCCINATE 25 MG: 25 TABLET, EXTENDED RELEASE ORAL at 10:19

## 2024-04-10 RX ADMIN — PANTOPRAZOLE SODIUM 40 MG: 40 TABLET, DELAYED RELEASE ORAL at 05:35

## 2024-04-10 RX ADMIN — APIXABAN 5 MG: 5 TABLET, FILM COATED ORAL at 10:19

## 2024-04-10 RX ADMIN — GABAPENTIN 100 MG: 100 CAPSULE ORAL at 10:19

## 2024-04-10 RX ADMIN — SENNOSIDES AND DOCUSATE SODIUM 2 TABLET: 8.6; 5 TABLET ORAL at 10:19

## 2024-04-10 RX ADMIN — CLOPIDOGREL BISULFATE 75 MG: 75 TABLET ORAL at 10:19

## 2024-04-10 RX ADMIN — POLYETHYLENE GLYCOL 3350 17 G: 17 POWDER, FOR SOLUTION ORAL at 10:18

## 2024-04-10 RX ADMIN — SUCRALFATE 1 G: 1 TABLET ORAL at 12:24

## 2024-04-10 RX ADMIN — INSULIN LISPRO 1 UNITS: 100 INJECTION, SOLUTION INTRAVENOUS; SUBCUTANEOUS at 12:23

## 2024-04-10 RX ADMIN — AMIODARONE HYDROCHLORIDE 200 MG: 200 TABLET ORAL at 10:19

## 2024-04-10 NOTE — DISCHARGE SUMMARY
[facility]  Discharge- Vesna Langston 1960, 64 y.o. female MRN: [id number]  Unit/Bed#: S -01 Encounter: [id number]  Primary Care Provider: Frannie Mancera DO   Date and time admitted to hospital: 4/6/2024 11:53 PM    * Syncope  Assessment & Plan  Admitted for syncope last week felt to be 2/2 vasovagal response   Presented with 1 day history of dizziness and lightheadedness --patient has been significantly hypotensive and with ABLA  Trauma w/u negative   Reduced dose of medications as noted below due to hypotension    Coffee ground emesis  Assessment & Plan  recent NATHAN on DAPT w ASA/Plavix as well as on Eliquis for Atrial flutter presented with tarry/coffee ground emesis as well as 2 episodes of melena.   Hgb drop 14 ->9.7-->8.4.  Currently improved to 8.7  Status post reversal w Kcentra   EGD findings of deep erosion on patient's distal esophagus/GE junction, with fresh white-based stomach ulcer noted in patient's antrum under the inflamed fold; another small pressure ulceration was noted in the adjacent area, but no evidence of any active bleeding  Protonix drip was added 4/7, change to PO PPI BID   CT high volume abdominal scan: Suspicion for gastric bleeding.      Elevated TSH  Assessment & Plan  Noted TSH to be elevated at 57.6 on April 1.  Repeat TSH remains elevated at 24.128  T3 is pending to be followed up by PCP but at this point would recommend initiating low-dose Synthroid 25 mcg daily.  Patient reports no prior history of hypothyroidism  This will need to be followed up as an outpatient with labs in 4 weeks    Type 2 diabetes mellitus, without long-term current use of insulin ([facility])  Assessment & Plan  Lab Results   Component Value Date    HGBA1C 7.9 (H) 03/22/2024     Recent Labs     04/09/24  1607 04/09/24  2104 04/10/24  0747 04/10/24  1120   POCGLU 108 155* 90 154*     Blood Sugar Average: Last 72 hrs:  (P) [id number] diagnosis in march   Not on any  antidiabetic medications   Blood sugars are stable at this time without any meds    S/P ICD (internal cardiac defibrillator) procedure  Assessment & Plan  This was placed due to sustained vtach during prior admission during when patient found to have STEMI  Patient inquiring about removing the dressing.  Would prefer she follow-up with her EP specialist as planned    HFrEF (heart failure with reduced ejection fraction) ([facility])  Assessment & Plan  Wt Readings from Last 3 Encounters:   04/10/24 93 kg (205 lb)   04/02/24 94.7 kg (208 lb 11.2 oz)   04/01/24 90 kg (198 lb 6.6 oz)   EF 35% per last TTE 2/2 ischemic cardiomyopathy w ICD in place  O/p GDMT - entresto, toprol   Diuretics: lasix 20 daily   Low sodium / fluid resctriction   Trend daily weights, I/O  Holding entresto and lasix in the setting of hypotension, with decreased dose of metoprolol as well  Orthostatics were obtained and patient's blood pressure was actually lower upon laying then standing but fortunately she is not symptomatic at this time  Currently euvolemic appearing    Atrial flutter, paroxysmal ([facility])  Assessment & Plan  Continue amiodarone 100 daily   Continue toprol at reduced dose of 12.5 nightly (discussed with cardiology) due to ongoing issues with blood pressure.  Fortunately rates have been stable  Eliquis was placed on hold in the setting of GIB, but now resumed    ST elevation myocardial infarction involving left anterior descending (LAD) coronary artery ([facility])  Assessment & Plan  With admission on 3/22/24  Continue follow-up with cardiology outpatient.  Continue Plavix      Medical Problems       Resolved Problems  Date Reviewed: 4/10/2024   None       Discharging Physician / Practitioner: Ana Lilia Dueñas PA-C  PCP: Frannie Mancera DO  Admission Date:   Admission Orders (From admission, onward)       Ordered        04/07/24 0209  Inpatient Admission  Once            04/07/24 0208  INPATIENT ADMISSION  Once                          Discharge  "Date: 04/10/24    Consultations During Hospital Stay:  GI    Procedures Performed:   EGD Esophagus-fresh linear deep erosion was noted in the distal esophagus at the GE junction. Stomach-fresh horizontal white-based ulcer noted in the antrum under an inflamed fold.  Another small fresh ulceration was noted in the adjacent area.  No evidence of any active bleeding. The duodenum appeared normal.  CT bleeding scan \"Suspected small hemorrhage from the anterior wall of the distal gastric body with focal wall thickening. Bleeding due to underlying lesion or PUD is suspected\"   CT cervical spine no acute fracture or traumatic malalignment  CT head no acute changes  Chest x-ray no acute    Significant Findings / Test Results:   As above    Incidental Findings:   Elevated TSH    Test Results Pending at Discharge (will require follow up):   T4     Outpatient Tests Requested:  Cbc  TSH    Complications:      Reason for Admission: Syncope and coffee-ground emesis    Hospital Course:   Vesna Langston is a 64 y.o. female patient with very recent admission for STEMI status post PCI as well as new heart failure with low EF, A-fib, new diagnosis diabetes and recent ICD placement who originally presented to the hospital on 4/6/2024 due to syncopal event.  She was initially assessed by the trauma team due to possible head strike while on anticoagulation but was ultimately cleared by their team.  However, she was having coffee-ground emesis and melena and was quite symptomatic with lightheadedness and hypotensive.  She was seen by GI and her blood thinners were held, and she received Kcentra.  She underwent EGD which showed esophageal erosions with stomach ulcers.  CAT scan confirmed concern for small hemorrhage.  She was placed on a Protonix drip and eventually placed on a heparin drip as well.  Her hemoglobin had dropped but eventually stabilized and remains in the high 8 range at this time.  She was resumed back on her Eliquis and " Plavix carefully.  Due to concerns with hypotension she was kept off of her Lasix and Entresto and her metoprolol dose had to be reduced at this time.  She has not had any additional events of bleeding and is on Protonix twice daily at this time.  She will need to continue to have close follow-up with cardiology and her family doctor.  Incidentally I also noted that during her lab work last week her TSH was 56 and I repeated it at which point it was still elevated at 24.  T3 is pending but I suspect she is hypothyroid and I initiated Synthroid 25 mcg with recommendation to follow-up closely with her PCP    Please see above list of diagnoses and related plan for additional information.     Condition at Discharge: stable    Discharge Day Visit / Exam:   Subjective: During my evaluation in the morning patient reported that she was passing gas but still had not had a bowel movement.  After receiving bowel regimen I was notified by nursing that she did have a large bowel movement was not black or bloody.  She was not reporting any chest pain or shortness of breath.  She continues to report feeling a little fatigued and not quite herself, but overall is much better  Vitals: Blood Pressure: (!) 116/49 (04/10/24 0936)  Pulse: (!) 107 (04/10/24 0936)  Temperature: 98 °F (36.7 °C) (04/10/24 0747)  Temp Source: Oral (04/08/24 2216)  Respirations: 16 (04/09/24 1756)  Weight - Scale: 93 kg (205 lb) (04/10/24 0544)  SpO2: 100 % (04/10/24 0936)  Exam:   Physical Exam  Vitals reviewed.   Constitutional:       General: She is not in acute distress.     Appearance: Normal appearance. She is not ill-appearing, toxic-appearing or diaphoretic.   Eyes:      General: No scleral icterus.        Right eye: No discharge.         Left eye: No discharge.      Conjunctiva/sclera: Conjunctivae normal.   Cardiovascular:      Rate and Rhythm: Normal rate.      Heart sounds: No murmur heard.  Pulmonary:      Effort: No respiratory distress.       Breath sounds: No stridor. No wheezing or rhonchi.   Abdominal:      General: Bowel sounds are normal. There is no distension.      Palpations: Abdomen is soft.      Tenderness: There is no abdominal tenderness. There is no guarding.   Musculoskeletal:      Right lower leg: No edema.      Left lower leg: No edema.   Skin:     General: Skin is warm and dry.      Coloration: Skin is not jaundiced or pale.      Findings: No bruising, erythema, lesion or rash.   Neurological:      General: No focal deficit present.      Mental Status: She is alert. Mental status is at baseline.          Discussion with Family: Updated  (daughter) via phone.    Discharge instructions/Information to patient and family:   See after visit summary for information provided to patient and family.      Provisions for Follow-Up Care:  See after visit summary for information related to follow-up care and any pertinent home health orders.      Mobility at time of Discharge:   Basic Mobility Inpatient Raw Score: 23  JH-HLM Goal: 7: Walk 25 feet or more  JH-HLM Achieved: 8: Walk 250 feet ot more  HLM Goal achieved. Continue to encourage appropriate mobility.     Disposition:   Home    Planned Readmission: none     Discharge Statement:  I spent 40 minutes discharging the patient. This time was spent on the day of discharge. I had direct contact with the patient on the day of discharge. Greater than 50% of the total time was spent examining patient, answering all patient questions, arranging and discussing plan of care with patient as well as directly providing post-discharge instructions.  Additional time then spent on discharge activities.  Case was discussed with nursing, case management, cardiology. Work note provided    Discharge Medications:  See after visit summary for reconciled discharge medications provided to patient and/or family.      **Please Note: This note may have been constructed using a voice recognition  system**

## 2024-04-10 NOTE — TELEPHONE ENCOUNTER
Patient was told by internal medicine at [facility] that she needs to have CBC done within 7 days. The order is not in the system, and she was wondering if Eunice Owen was able to put the order in. She does have an appointment on 4/17 with Eunice Owen for follow up. Please call Vesna at [phone] to let her know if we are able to assist her with this matter.

## 2024-04-10 NOTE — ASSESSMENT & PLAN NOTE
Continue amiodarone 100 daily   Continue toprol at reduced dose of 12.5 nightly (discussed with cardiology) due to ongoing issues with blood pressure.  Fortunately rates have been stable  Eliquis was placed on hold in the setting of GIB, but now resumed

## 2024-04-10 NOTE — ASSESSMENT & PLAN NOTE
recent NATHAN on DAPT w ASA/Plavix as well as on Eliquis for Atrial flutter presented with tarry/coffee ground emesis as well as 2 episodes of melena.   Hgb drop 14 ->9.7-->8.4.  Currently improved to 8.7  Status post reversal w Kcentra   EGD findings of deep erosion on patient's distal esophagus/GE junction, with fresh white-based stomach ulcer noted in patient's antrum under the inflamed fold; another small pressure ulceration was noted in the adjacent area, but no evidence of any active bleeding  Protonix drip was added 4/7, change to PO PPI BID   CT high volume abdominal scan: Suspicion for gastric bleeding.

## 2024-04-10 NOTE — ASSESSMENT & PLAN NOTE
Noted TSH to be elevated at 57.6 on April 1.  Repeat TSH remains elevated at 24.128  T3 is pending to be followed up by PCP but at this point would recommend initiating low-dose Synthroid 25 mcg daily.  Patient reports no prior history of hypothyroidism  This will need to be followed up as an outpatient with labs in 4 weeks

## 2024-04-10 NOTE — ASSESSMENT & PLAN NOTE
Admitted for syncope last week felt to be 2/2 vasovagal response   Presented with 1 day history of dizziness and lightheadedness --patient has been significantly hypotensive and with ABLA  Trauma w/u negative   Reduced dose of medications as noted below due to hypotension

## 2024-04-10 NOTE — CASE MANAGEMENT
Case Management Discharge Planning Note    Patient name Vesna COLE /S -01 MRN [id number]  : 1960 Date 4/10/2024       Current Admission Date: 2024  Current Admission Diagnosis:Syncope   Patient Active Problem List    Diagnosis Date Noted    Elevated TSH 2024    Coffee ground emesis 2024    Type 2 diabetes mellitus, without long-term current use of insulin ([facility]) 2024    Syncope 2024    Guaiac positive stools 2024    S/P ICD (internal cardiac defibrillator) procedure 2024    Chronic low back pain 2024    HFrEF (heart failure with reduced ejection fraction) ([facility]) 2024    Ischemic cardiomyopathy 2024    Coronary artery disease involving native coronary artery of native heart 2024    S/P coronary artery stent placement 2024    ST elevation myocardial infarction involving left anterior descending (LAD) coronary artery ([facility]) 2024    Atrial flutter, paroxysmal ([facility]) 2024    Hyperlipemia 2024    New onset type 2 diabetes mellitus ([facility]) 2024    Tobacco abuse 2024    History of sustained ventricular tachycardia 2024    Symptoms of upper respiratory infection (URI) 2023    Bronchitis 2022    Back pain 2022    Sciatica of left side 2022      LOS (days): 3  Geometric Mean LOS (GMLOS) (days): 4.6  Days to GMLOS:1.1     OBJECTIVE:  Risk of Unplanned Readmission Score: 18.93         Current admission status: Inpatient   Preferred Pharmacy:   [facility]/pharmacy #[id number] [place] - [address] [place] [id number]  Phone: [phone] Fax: [phone]    [facility]/pharmacy #[id number] [facility] [place] - [address] [address]  Phone: [phone] Fax: [phone]    Primary Care Provider: Frannie Mancera DO    Primary Insurance: BLUE CROSS  Secondary Insurance: MEDICARE    DISCHARGE DETAILS:    Discharge planning  discussed with:: Patient        Treatment Team Recommendation: Home  Discharge Destination Plan:: Home  Transport at Discharge : Family        ETA of Transport (Date): 04/10/24  ETA of Transport (Time): 1500     Transfer Mode: Wheelchair  Accompanied by: Family member     IMM Given (Date):: 04/10/24  IMM Given to:: Patient   ..IMM reviewed with patient, patient agrees with discharge determination.

## 2024-04-10 NOTE — ASSESSMENT & PLAN NOTE
Wt Readings from Last 3 Encounters:   04/10/24 93 kg (205 lb)   04/02/24 94.7 kg (208 lb 11.2 oz)   04/01/24 90 kg (198 lb 6.6 oz)   EF 35% per last TTE 2/2 ischemic cardiomyopathy w ICD in place  O/p GDMT - entresto, toprol   Diuretics: lasix 20 daily   Low sodium / fluid resctriction   Trend daily weights, I/O  Holding entresto and lasix in the setting of hypotension, with decreased dose of metoprolol as well  Orthostatics were obtained and patient's blood pressure was actually lower upon laying then standing but fortunately she is not symptomatic at this time  Currently euvolemic appearing

## 2024-04-10 NOTE — ASSESSMENT & PLAN NOTE
This was placed due to sustained vtach during prior admission during when patient found to have STEMI  Patient inquiring about removing the dressing.  Would prefer she follow-up with her EP specialist as planned

## 2024-04-10 NOTE — ASSESSMENT & PLAN NOTE
Lab Results   Component Value Date    HGBA1C 7.9 (H) 03/22/2024     Recent Labs     04/09/24  1607 04/09/24  2104 04/10/24  0747 04/10/24  1120   POCGLU 108 155* 90 154*     Blood Sugar Average: Last 72 hrs:  (P) [id number] diagnosis in march   Not on any antidiabetic medications   Blood sugars are stable at this time without any meds

## 2024-04-10 NOTE — PLAN OF CARE
Problem: PAIN - ADULT  Goal: Verbalizes/displays adequate comfort level or baseline comfort level  Description: Interventions:  - Encourage patient to monitor pain and request assistance  - Assess pain using appropriate pain scale  - Administer analgesics based on type and severity of pain and evaluate response  - Implement non-pharmacological measures as appropriate and evaluate response  - Consider cultural and social influences on pain and pain management  - Notify physician/advanced practitioner if interventions unsuccessful or patient reports new pain  Outcome: Progressing     Problem: INFECTION - ADULT  Goal: Absence or prevention of progression during hospitalization  Description: INTERVENTIONS:  - Assess and monitor for signs and symptoms of infection  - Monitor lab/diagnostic results  - Monitor all insertion sites, i.e. indwelling lines, tubes, and drains  - Monitor endotracheal if appropriate and nasal secretions for changes in amount and color  - [employer] appropriate cooling/warming therapies per order  - Administer medications as ordered  - Instruct and encourage patient and family to use good hand hygiene technique  - Identify and instruct in appropriate isolation precautions for identified infection/condition  Outcome: Progressing  Goal: Absence of fever/infection during neutropenic period  Description: INTERVENTIONS:  - Monitor WBC    Outcome: Progressing

## 2024-04-11 ENCOUNTER — TELEPHONE (OUTPATIENT)
Age: 64
End: 2024-04-11

## 2024-04-11 NOTE — TELEPHONE ENCOUNTER
Pt returned call and I let her know that she needs to contact her PCP office to order her bloodwork. She explained yesterday that her normal PCP is out on leave, and she would like Eunice to place the order for the CBC. After I spoke with her today, she stated that there is another PCP covering, and she will reach out to them to see if they can put in the order. I told her to reach back out to us if there are any issues.

## 2024-04-12 ENCOUNTER — IN-CLINIC DEVICE VISIT (OUTPATIENT)
Dept: CARDIOLOGY CLINIC | Facility: CLINIC | Age: 64
End: 2024-04-12

## 2024-04-12 DIAGNOSIS — Z95.810 AICD (AUTOMATIC CARDIOVERTER/DEFIBRILLATOR) PRESENT: Primary | ICD-10-CM

## 2024-04-12 PROCEDURE — 99024 POSTOP FOLLOW-UP VISIT: CPT | Performed by: STUDENT IN AN ORGANIZED HEALTH CARE EDUCATION/TRAINING PROGRAM

## 2024-04-12 NOTE — PROGRESS NOTES
Results for orders placed or performed in visit on 04/12/24   Cardiac EP device report    Narrative    MDT DUAL ICD/ ACTIVE SYSTEM IS MRI CONDITIONAL  DEVICE INTERROGATED IN THE [place] OFFICE: BATTERY VOLTAGE ADEQUATE-13 YRS. AP 2%  0%. ALL AVAILABLE LEAD PARAMETERS & TRENDS WITHIN NORMAL LIMITS. 1 DEVICE CLASSIFIED VHR NOTED; NO THERAPIES NEEDED. PT ON AMIO, METO SUCC, & ELIQUIS. EF 35% (ECHO 2024). OPTI-VOL FLUID THRESHOLDS INITIALIZING. NO PROGRAMMING CHANGES MADE TO DEVICE PARAMETERS. WOUND CHECK: INCISION CLEAN AND DRY WITH EDGES APPROXIMATED; AQUACEL REMOVED. WOUND CARE AND RESTRICTIONS REVIEWED WITH PATIENT. NC

## 2024-04-12 NOTE — UTILIZATION REVIEW
NOTIFICATION OF ADMISSION DISCHARGE   This is a Notification of Discharge from [facility]. Please be advised that this patient has been discharge from our facility. Below you will find the admission and discharge date and time including the patient’s disposition.   UTILIZATION REVIEW CONTACT:  Cali Felton  Utilization   Network Utilization Review Department  Phone: [phone] x carefully listen to the prompts. All voicemails are confidential.  Email: [email or web address]     ADMISSION INFORMATION  PRESENTATION DATE: 4/6/2024 11:53 PM  OBERVATION ADMISSION DATE:   INPATIENT ADMISSION DATE: 4/7/24  2:08 AM   DISCHARGE DATE: 4/10/2024  4:02 PM   DISPOSITION:Home/Self Care    Network Utilization Review Department  ATTENTION: Please call with any questions or concerns to [phone] and carefully listen to the prompts so that you are directed to the right person. All voicemails are confidential.   For Discharge needs, contact Care Management DC Support Team at [phone] opt. 2  Send all requests for admission clinical reviews, approved or denied determinations and any other requests to dedicated fax number below belonging to the campus where the patient is receiving treatment. List of dedicated fax numbers for the Facilities:  FACILITY NAME UR FAX NUMBER   ADMISSION DENIALS (Administrative/Medical Necessity) [phone]   DISCHARGE SUPPORT TEAM ([facility]) [phone]   PARENT CHILD HEALTH (Maternity/NICU/Pediatrics) [phone]   [facility] [phone]   [facility] [phone]   [facility] [phone]   [facility] [phone]   Critical access hospital [phone]   [facility] [phone]   [facility] [phone]   [facility] [phone]   [facility]  [facility] [phone]   [facility] [phone]   [facility] [phone]   [facility] [phone]

## 2024-04-16 ENCOUNTER — TELEPHONE (OUTPATIENT)
Dept: CARDIAC REHAB | Facility: HOSPITAL | Age: 64
End: 2024-04-16

## 2024-04-16 NOTE — TELEPHONE ENCOUNTER
Patient called regarding her cardiac rehab evaluation. Patient was scheduled for 2 evaluations, 5/13 and 5/20. Patient thought she was scheduling additional exercise sessions. Patient confirmed which evaluation she wanted. Patient expressed understanding that her exercise session schedule will be created during the initial evaluation.

## 2024-04-17 ENCOUNTER — OFFICE VISIT (OUTPATIENT)
Dept: CARDIOLOGY CLINIC | Facility: CLINIC | Age: 64
End: 2024-04-17
Payer: COMMERCIAL

## 2024-04-17 VITALS
OXYGEN SATURATION: 100 % | DIASTOLIC BLOOD PRESSURE: 60 MMHG | SYSTOLIC BLOOD PRESSURE: 98 MMHG | WEIGHT: 209.2 LBS | HEART RATE: 79 BPM | BODY MASS INDEX: 31.71 KG/M2 | HEIGHT: 68 IN

## 2024-04-17 DIAGNOSIS — I25.10 CORONARY ARTERY DISEASE INVOLVING NATIVE CORONARY ARTERY OF NATIVE HEART WITHOUT ANGINA PECTORIS: Chronic | ICD-10-CM

## 2024-04-17 DIAGNOSIS — Z86.79 HISTORY OF SUSTAINED VENTRICULAR TACHYCARDIA: Chronic | ICD-10-CM

## 2024-04-17 DIAGNOSIS — I48.92 ATRIAL FLUTTER, PAROXYSMAL (HCC): Chronic | ICD-10-CM

## 2024-04-17 DIAGNOSIS — I50.22 CHRONIC HFREF (HEART FAILURE WITH REDUCED EJECTION FRACTION) (HCC): ICD-10-CM

## 2024-04-17 DIAGNOSIS — Z09 HOSPITAL DISCHARGE FOLLOW-UP: Primary | ICD-10-CM

## 2024-04-17 PROBLEM — I21.02 ST ELEVATION MYOCARDIAL INFARCTION INVOLVING LEFT ANTERIOR DESCENDING (LAD) CORONARY ARTERY (HCC): Status: RESOLVED | Noted: 2024-03-22 | Resolved: 2024-04-17

## 2024-04-17 PROCEDURE — 99215 OFFICE O/P EST HI 40 MIN: CPT | Performed by: PHYSICIAN ASSISTANT

## 2024-04-17 RX ORDER — FUROSEMIDE 20 MG/1
20 TABLET ORAL AS NEEDED
Qty: 30 TABLET | Refills: 1 | Status: SHIPPED | OUTPATIENT
Start: 2024-04-17

## 2024-04-17 NOTE — PATIENT INSTRUCTIONS
Added as needed Lasix to your medication list (10-20 mg for rapid weight gain).     Please weigh yourself every day (after emptying your bladder) and keep a detailed log of weights.   Contact the Heart Failure program at [phone] if you gain 3+ lbs overnight or 5+ lbs in 5-7 days.  Limit daily sodium/salt intake to 2000 mg daily to prevent fluid retention.  Avoid canned foods, fast food/Chinese food, and processed meats (hot dogs, lunch meat, and sausage etc.). Caution with condiments.  Limit fluid intake to 2000 mL or 2 liters (about 60-65 ounces) daily.  Avoid electrolyte replacement drinks (such as Gatorade, Pedialyte, Propel, Liquid IV, etc.).  Bring complete list of medications and log of daily weights to your follow-up appointment.

## 2024-04-17 NOTE — PROGRESS NOTES
"General Cardiology Outpatient Progress Note    Vesna Langston 64 y.o. female   MRN: [id number]  Encounter: [id number]    Assessment:  Patient Active Problem List    Diagnosis Date Noted    Constipation 04/10/2024    Elevated TSH 04/09/2024    Coffee ground emesis 04/07/2024    Type 2 diabetes mellitus, without long-term current use of insulin ([facility]) 04/07/2024    Syncope 04/01/2024    Guaiac positive stools 04/01/2024    S/P ICD (internal cardiac defibrillator) procedure 03/27/2024    Chronic low back pain 03/25/2024    HFrEF (heart failure with reduced ejection fraction) ([facility]) 03/25/2024    Ischemic cardiomyopathy 03/24/2024    Coronary artery disease involving native coronary artery of native heart 03/23/2024    S/P coronary artery stent placement 03/23/2024    ST elevation myocardial infarction involving left anterior descending (LAD) coronary artery ([facility]) 03/22/2024    Atrial flutter, paroxysmal ([facility]) 03/22/2024    Hyperlipemia 03/22/2024    New onset type 2 diabetes mellitus ([facility]) 03/22/2024    Tobacco abuse 03/22/2024    History of sustained ventricular tachycardia 03/22/2024    Symptoms of upper respiratory infection (URI) 03/04/2023    Bronchitis 11/28/2022    Back pain 07/31/2022    Sciatica of left side 07/31/2022       Today's Plan:  Continue current medications. BP limiting addition of HF GDMT today.   Well compensated on exam today. Added PRN Lasix to medication list for rapid weight gain/acute onset HF symptoms.     Plan:  Chronic HFrEF; LVEF 35%; LVIDd 4.7 cm   [facility] 03/22/2024: received PCI to 100% stenosis of ostial/proximal LAD.   TTE 03/23/2024: LVEF 35%. LVIDd 4.7 cm. RWMA. Normal RV. Moderate MR. Mild TR. Dilated IVC.    cMRI 03/26/2024: LVEF 20%. LVIDd 4.5 cm. \"Transmural scarring of the mid anterior, anteroseptal and inferoseptal walls, the apical anterior, septal and inferior walls and the apex.\"   Weight of 205 lbs on 04/10 (day of discharge). Today, weighs 209 lbs.    Most recent BMP from " 04/09/2024: sodium 137; potassium 3.8; BUN 10; creatinine 0.79; eGFR 78.     Pharmacotherapies / Neurohormonal Blockade:  --Beta Blocker: metoprolol succinate 12.5 mg qHS.   --ARNi / ACEi / ARB: No (Entresto discontinued during 04/2024 admission due to hypotension).   --Aldosterone Antagonist: No.   --SGLT2 Inhibitor: No.   --Diuretic: PRN Lasix 20 mg.     Sudden Cardiac Death Risk Reduction:  --[employer] dual chamber ICD in situ since 03/2024 (secondary prevention).   --Interrogation from 04/12/2024: AP 2%.  0%. Lead parameters WNL. OptiVol initializing.     Electrical Resynchronization:  --Candidacy for BiV device: narrow QRS.     Advanced Therapies: Will continue to monitor.    Coronary artery disease   Without active chest pain.   S/p STEMI in 03/2024; received PCI to 100% stenosis of ostial/proximal LAD.   Continues on Plavix, statin, and BB as above.    History of monomorphic ventricular tachycardia   Per EP notes, felt to be scar mediated.    S/p secondary prevention ICD.    Continues on amiodarone per EP. BB as above.     Atrial flutter, paroxysmal    [id number] = 5 (age, sex, HF, CAD, DM).   Anticoagulation on Eliquis.    Rate control: BB as above.   Rhythm control: amiodarone per EP.    Hyperlipidemia  Diabetes mellitus, type II  Chronic back pain  History of tobacco abuse    HPI:   Vesna Langston is a 64-year-old woman with a PMH as above who presents to the office for hospital follow-up.    Presented to [facility] on 03/22/2024 via EMS s/p 3 reported syncopal events at home. EKG with rapid atrial flutter with ST elevations. STEMI alert called. Received adenosine and converted to NSR. Later went into sustained monomorphic VT, and was cardioverted in ED with shock x1. Transferred to [facility] and underwent C during which she received received PCI to 100% stenosis of ostial/proximal LAD. Started on DAPT and AC (1 week triple therapy recommended per EP). TTE with new LVEF 35%. EP  "team consulted and implanted dual chamber ICD for secondary prevention, and started AADs. IV Lasix stopped on 03/25; transitioned to PO Lasix on day of discharge. Lost 5 lbs this admission.     Admitted to [facility] on 04/02/2024 after presenting s/p syncope at home while trying to have BM. Received 1L IV fluids in ED. ICD was interrogated. Per notes, \"seems to be a classic vasovagal syncop[e].\" Lasix dose decreased to 20 mg daily.     04/02/2024: Patient presents with her son for hospital follow-up. Reviewed hospital course and reviewed medications in detail. Feeling well today. Denies LH, dizziness, CP, SOB, LE swelling, PND and orthopnea. Is completing daily weights; 204-205 lbs on home scale since discharge. Currently living with her daughter, Laura, in [place] (who, per patient, has OCD). Laura has been extremely rigid in monitoring patient's carb and sodium intake (some days with essentially 0 carb intake). Patient reports feeling shaky on one of those days with low carb intake (improved after eating low carb cookie). Provided patient with \"Living With Heart Failure\" booklet and \"Don't Pass the Salt\" cookbook.    Admitted to [facility] from 04/06 to 04/10/2024 after presenting s/p syncope. Found to have ABLA and AC/DAPT was held. Did receive blood products. Underwent EGD with GI due to coffee-ground emesis. Cardiology not consulted during admission. AC and Plavix were then restarted. Entresto and Lasix discontinued. BB dose decreased.     04/17/2024: Patient presents with her son for follow-up. No current cardiac complaints today. Denies LH, CP, palpitations, SOB, TAMAYO, LE swelling, PND, and orthopnea. Reviewed home BP and weight logs. SBP running in 90-110s. No acute weight gains since discharge.     Past Medical History:   Diagnosis Date    Acute respiratory insufficiency 03/22/2024    Allergic     Chronic HFrEF (heart failure with reduced ejection fraction) ([facility])     Coronary artery disease     COVID-19 " virus infection 11/28/2022    Diabetes mellitus (HCC)     Hyperlipidemia     Hypoglycemia     ICD (implantable cardioverter-defibrillator) in place     Ischemic cardiomyopathy     Lactic acidosis 03/22/2024    Tobacco abuse        Review of Systems   Constitutional:  Negative for activity change, appetite change, fatigue and unexpected weight change.   Respiratory:  Negative for cough, chest tightness and shortness of breath.    Cardiovascular:  Negative for chest pain, palpitations and leg swelling.   Gastrointestinal:  Negative for abdominal distention, abdominal pain, diarrhea and nausea.   Genitourinary:  Negative for decreased urine volume, dysuria and urgency.   Musculoskeletal: Negative.    Skin: Negative.    Neurological:  Negative for dizziness, syncope, weakness and light-headedness.   Psychiatric/Behavioral:  Negative for confusion and sleep disturbance. The patient is not nervous/anxious.        Allergies   Allergen Reactions    Shellfish-Derived Products - Food Allergy Anaphylaxis    Azithromycin Rash         Current Outpatient Medications:     acetaminophen (TYLENOL) 650 mg CR tablet, Take 650 mg by mouth every 8 (eight) hours as needed, Disp: , Rfl:     albuterol (PROAIR HFA) 90 mcg/act inhaler, Inhale 2 puffs every 6 (six) hours as needed for wheezing, Disp: 8.5 g, Rfl: 0    amiodarone 200 mg tablet, Take 1 tablet (200 mg total) by mouth daily, Disp: , Rfl:     apixaban (ELIQUIS) 5 mg, Take 1 tablet (5 mg total) by mouth 2 (two) times a day, Disp: 60 tablet, Rfl: 2    atorvastatin (LIPITOR) 80 mg tablet, Take 1 tablet (80 mg total) by mouth every evening, Disp: 30 tablet, Rfl: 2    clopidogrel (PLAVIX) 75 mg tablet, Take 1 tablet (75 mg total) by mouth daily, Disp: 30 tablet, Rfl: 2    gabapentin (Neurontin) 100 mg capsule, Take 1 capsule (100 mg total) by mouth 2 (two) times a day, Disp: , Rfl:     levothyroxine (Synthroid) 25 mcg tablet, Take 1 tablet (25 mcg total) by mouth daily (Patient taking  differently: Take 25 mcg by mouth daily Takes in middle of night. Take on empty stomach.), Disp: 30 tablet, Rfl: 0    meclizine (ANTIVERT) 25 mg tablet, Take 1 tablet (25 mg total) by mouth every 8 (eight) hours as needed for dizziness, Disp: 30 tablet, Rfl: 0    metoprolol succinate (TOPROL-XL) 25 mg 24 hr tablet, Take 0.5 tablets (12.5 mg total) by mouth daily at bedtime, Disp: , Rfl:     Multiple Vitamin (MULTIVITAMIN) capsule, Take 1 capsule by mouth daily, Disp: , Rfl:     pantoprazole (PROTONIX) 40 mg tablet, Take 1 tablet (40 mg total) by mouth 2 (two) times a day before meals, Disp: 60 tablet, Rfl: 0    polyethylene glycol (MIRALAX) 17 g packet, Take 17 g by mouth daily, Disp: , Rfl:     senna-docusate sodium (SENOKOT S) 8.6-50 mg per tablet, Take 2 tablets by mouth 2 (two) times a day, Disp: 30 tablet, Rfl: 0    sucralfate (CARAFATE) 1 g tablet, Take 1 tablet (1 g total) by mouth 4 (four) times a day (before meals and at bedtime), Disp: 120 tablet, Rfl: 0    Social History     Socioeconomic History    Marital status:      Spouse name: Not on file    Number of children: 3    Years of education: Not on file    Highest education level: Not on file   Occupational History    Not on file   Tobacco Use    Smoking status: Former     Current packs/day: 1.00     Types: Cigarettes    Smokeless tobacco: Never    Tobacco comments:     Smoked 0.5-1 ppd; quit in 03/2024.    Vaping Use    Vaping status: Not on file   Substance and Sexual Activity    Alcohol use: Yes     Comment: rarely    Drug use: Not Currently    Sexual activity: Not on file   Other Topics Concern    Not on file   Social History Narrative    Not on file     Social Determinants of Health     Financial Resource Strain: Low Risk  (2/20/2023)    Received from [facility]    Overall Financial Resource Strain (CARDIA)     Difficulty of Paying Living Expenses: Not hard at all   Food Insecurity: No Food Insecurity (4/8/2024)    Hunger  "Vital Sign     Worried About Running Out of Food in the Last Year: Never true     Ran Out of Food in the Last Year: Never true   Transportation Needs: No Transportation Needs (4/8/2024)    PRAPARE - Transportation     Lack of Transportation (Medical): No     Lack of Transportation (Non-Medical): No   Physical Activity: Not on file   Stress: No Stress Concern Present (2/20/2023)    Received from [facility]    [place] [employer] - Occupational Stress Questionnaire     Feeling of Stress : Not at all   Social Connections: Moderately Isolated (2/20/2023)    Received from [facility]    Social Connection and Isolation Panel [NHANES]     Frequency of Communication with Friends and Family: More than three times a week     Frequency of Social Gatherings with Friends and Family: Once a week     Attends Oriental orthodox Services: More than 4 times per year     Active Member of Clubs or Organizations: No     Attends Club or Organization Meetings: Never     Marital Status:    Intimate Partner Violence: Not At Risk (2/20/2023)    Received from [facility]    Humiliation, Afraid, Rape, and Kick questionnaire     Fear of Current or Ex-Partner: No     Emotionally Abused: No     Physically Abused: No     Sexually Abused: No   Housing Stability: Low Risk  (4/8/2024)    Housing Stability Vital Sign     Unable to Pay for Housing in the Last Year: No     Number of Places Lived in the Last Year: 2     Unstable Housing in the Last Year: No     Family History   Problem Relation Age of Onset    Depression Mother     Heart disease Father     OCD Daughter        Vitals:   Blood pressure 98/60, pulse 79, height 5' 8\" (1.727 m), weight 94.9 kg (209 lb 3.2 oz), SpO2 100%.    Wt Readings from Last 10 Encounters:   04/17/24 94.9 kg (209 lb 3.2 oz)   04/10/24 93 kg (205 lb)   04/02/24 94.7 kg (208 lb 11.2 oz)   04/01/24 90 kg (198 lb 6.6 oz)   03/28/24 90.9 kg (200 lb 6.4 oz) " "  03/22/24 103 kg (227 lb 1.2 oz)   07/17/22 83.9 kg (185 lb)   01/03/22 86.2 kg (190 lb)   09/11/21 86.2 kg (190 lb)   07/29/19 88.9 kg (196 lb)     Vitals:    04/17/24 0849   BP: 98/60   BP Location: Left arm   Patient Position: Sitting   Cuff Size: Large   Pulse: 79   SpO2: 100%   Weight: 94.9 kg (209 lb 3.2 oz)   Height: 5' 8\" (1.727 m)       Physical Exam  Vitals reviewed.   Constitutional:       General: She is awake. She is not in acute distress.     Appearance: Normal appearance. She is well-developed. She is not toxic-appearing or diaphoretic.   HENT:      Head: Normocephalic.      Nose: Nose normal.      Mouth/Throat:      Mouth: Mucous membranes are moist.   Eyes:      General: No scleral icterus.     Conjunctiva/sclera: Conjunctivae normal.   Neck:      Vascular: No JVD.      Trachea: No tracheal deviation.   Cardiovascular:      Rate and Rhythm: Normal rate and regular rhythm.      Heart sounds: Murmur heard.   Pulmonary:      Effort: Pulmonary effort is normal. No tachypnea, bradypnea or respiratory distress.      Breath sounds: Normal air entry. No decreased air movement. No decreased breath sounds or wheezing.   Abdominal:      General: There is no distension.      Palpations: Abdomen is soft.      Tenderness: There is no abdominal tenderness.   Musculoskeletal:      Cervical back: Neck supple.      Right lower leg: Edema (trace) present.      Left lower leg: Edema (trace) present.   Skin:     General: Skin is warm and dry.      Coloration: Skin is pale. Skin is not jaundiced.   Neurological:      General: No focal deficit present.      Mental Status: She is alert and oriented to person, place, and time.   Psychiatric:         Attention and Perception: Attention normal.         Mood and Affect: Mood and affect normal.         Speech: Speech normal.         Behavior: Behavior normal. Behavior is cooperative.         Thought Content: Thought content normal.       Labs & Results:  Lab Results " "  Component Value Date    WBC 8.56 04/09/2024    HGB 8.7 (L) 04/10/2024    HCT 27.0 (L) 04/10/2024    MCV 94 04/09/2024     04/09/2024     Lab Results   Component Value Date    SODIUM 137 04/09/2024    K 3.8 04/09/2024     04/09/2024    CO2 26 04/09/2024    BUN 10 04/09/2024    CREATININE 0.79 04/09/2024    GLUC 98 04/09/2024    CALCIUM 8.1 (L) 04/09/2024     Lab Results   Component Value Date    INR 1.13 04/07/2024    INR 1.25 (H) 04/07/2024    INR 1.0 07/31/2022    PROTIME 15.2 (H) 04/07/2024    PROTIME 16.4 (H) 04/07/2024     No results found for: \"NTBNP\"     Lab Results   Component Value Date     (H) 04/01/2024      Eunice Owen PA-C  "

## 2024-04-19 ENCOUNTER — OFFICE VISIT (OUTPATIENT)
Dept: FAMILY MEDICINE CLINIC | Facility: CLINIC | Age: 64
End: 2024-04-19
Payer: COMMERCIAL

## 2024-04-19 VITALS
DIASTOLIC BLOOD PRESSURE: 60 MMHG | HEART RATE: 65 BPM | SYSTOLIC BLOOD PRESSURE: 108 MMHG | HEIGHT: 68 IN | BODY MASS INDEX: 31.83 KG/M2 | OXYGEN SATURATION: 98 % | WEIGHT: 210 LBS

## 2024-04-19 DIAGNOSIS — I25.10 CORONARY ARTERY DISEASE INVOLVING NATIVE CORONARY ARTERY OF NATIVE HEART WITHOUT ANGINA PECTORIS: Chronic | ICD-10-CM

## 2024-04-19 DIAGNOSIS — Z72.0 TOBACCO ABUSE: ICD-10-CM

## 2024-04-19 DIAGNOSIS — K25.9 MULTIPLE GASTRIC ULCERS: ICD-10-CM

## 2024-04-19 DIAGNOSIS — D62 ANEMIA DUE TO ACUTE BLOOD LOSS: ICD-10-CM

## 2024-04-19 DIAGNOSIS — E11.9 TYPE 2 DIABETES MELLITUS, WITHOUT LONG-TERM CURRENT USE OF INSULIN (HCC): ICD-10-CM

## 2024-04-19 DIAGNOSIS — K92.0 COFFEE GROUND EMESIS: Primary | ICD-10-CM

## 2024-04-19 DIAGNOSIS — R55 SYNCOPE, UNSPECIFIED SYNCOPE TYPE: ICD-10-CM

## 2024-04-19 DIAGNOSIS — E78.00 HYPERCHOLESTEROLEMIA: ICD-10-CM

## 2024-04-19 DIAGNOSIS — E78.5 HYPERLIPIDEMIA, UNSPECIFIED HYPERLIPIDEMIA TYPE: ICD-10-CM

## 2024-04-19 DIAGNOSIS — E03.9 ACQUIRED HYPOTHYROIDISM: ICD-10-CM

## 2024-04-19 PROBLEM — J40 BRONCHITIS: Status: RESOLVED | Noted: 2022-11-28 | Resolved: 2024-04-19

## 2024-04-19 PROBLEM — R79.89 ELEVATED TSH: Status: RESOLVED | Noted: 2024-04-09 | Resolved: 2024-04-19

## 2024-04-19 PROCEDURE — 99495 TRANSJ CARE MGMT MOD F2F 14D: CPT | Performed by: FAMILY MEDICINE

## 2024-04-19 NOTE — PROGRESS NOTES
Assessment & Plan     1. Coffee ground emesis  Assessment & Plan:  Reviewed hospital reports and reconciled medication.  Does seem much more comfortable at this time and will continue with the protonic and the Carafate.  Did make referral for GI for follow-up and will recheck CBC.  Iron levels appeared adequate in the hospital stay      2. Type 2 diabetes mellitus, without long-term current use of insulin ([facility])  Assessment & Plan:  Was diagnosed with type 2 diabetes.  Discussed dietary changes.  Should only be checking blood sugar in the morning at this time and fasting sugars seem to be good by report  Lab Results   Component Value Date    HGBA1C 7.9 (H) 03/22/2024         3. Coronary artery disease involving native coronary artery of native heart without angina pectoris  Assessment & Plan:  Reviewed hospital reports and reconciled meds.  Continue current meds and follow-up and does have referral for cardiac rehab      4. Hypercholesterolemia  -     Lipid panel; Future    5. Acquired hypothyroidism  Assessment & Plan:  Will repeat thyroid levels.  Has been taking 25 mcg supplement and I feel will probably need to increase this    Orders:  -     TSH, 3rd generation with Free T4 reflex; Future    6. Anemia due to acute blood loss  Assessment & Plan:  Will check CBC but does not appear particularly pale at this time    Orders:  -     CBC and differential; Future; Expected date: 05/03/2024    7. Multiple gastric ulcers  -     Ambulatory Referral to Gastroenterology; Future; Expected date: 05/03/2024    8. Tobacco abuse  Assessment & Plan:  Needs to avoid smoking      9. Syncope, unspecified syncope type  Assessment & Plan:  Has had problems with acute blood loss as well as hypotension and cardiac dysrhythmia.  Does appear stable at this time      10. Hyperlipidemia, unspecified hyperlipidemia type  Assessment & Plan:  Levels had been very high.  Is now on high-dose Lipitor at 80 mg and will repeat the lipid  panel          Depression Screening and Follow-up Plan: Patient was screened for depression during today's encounter. They screened negative with a PHQ-2 score of 2.    Falls Plan of Care: balance, strength, and gait training instructions were provided. Push walking activity to improve leg strength    Urinary Incontinence Plan of Care: counseling topics discussed: practice Kegel (pelvic floor strengthening) exercises and limiting fluid intake 3-4 hours before bed.       Subjective     Transitional Care Management Review:   Vesna Langston is a 64 y.o. female here for TCM follow up.     During the TCM phone call patient stated:  TCM Call       None          TCM Call       None          Patient seen for first office check with transition of care.  Patient had 3 recent hospitalizations the first for MI then for problems with syncope and was found to be in atrial flutter.  Did have pacemaker placed with defibrillator.  Last hospitalization was for GI blood loss and was found to have severe ulcerations in the stomach.  Is on protonic at this time as well as Carafate and no longer having pain.  Is following up with cardiology and interventional cardiology in [place] and is going to need to be started on cardiac rehab which is set up for evaluation in the second week of May overall feels well at this time and is gradually increasing activity.  Thyroid levels were found to be low in the hospital and was started on 25 mcg of levothyroxine.  Is only taking 12.5 mg of metoprolol because was having trouble with hypotension but does not claim to be lightheaded at this time      Review of Systems   Constitutional:  Positive for fatigue (This has improved).   HENT:  Negative for congestion and trouble swallowing.    Eyes:  Negative for visual disturbance.   Respiratory:  Negative for cough and shortness of breath.    Cardiovascular:  Positive for leg swelling. Negative for chest pain and palpitations.   Gastrointestinal:  Negative  "for abdominal pain, constipation and diarrhea.   Endocrine: Negative for polyuria (Nocturia x 1-2).   Musculoskeletal:  Positive for arthralgias.   Skin:  Negative for pallor.   Allergic/Immunologic: Positive for environmental allergies.   Neurological:  Negative for dizziness and light-headedness.   Psychiatric/Behavioral:  Negative for sleep disturbance.        Objective     Blood Pressure 108/60 (BP Location: Left arm, Patient Position: Sitting, Cuff Size: Large)   Pulse 65   Height 5' 8\" (1.727 m)   Weight 95.3 kg (210 lb)   Oxygen Saturation 98%   Body Mass Index 31.93 kg/m²      Physical Exam  Vitals and nursing note reviewed.   Constitutional:       Appearance: Normal appearance. She is not ill-appearing.   HENT:      Head: Normocephalic.      Right Ear: External ear normal.      Left Ear: External ear normal.      Nose: Nose normal.      Mouth/Throat:      Mouth: Mucous membranes are moist.   Eyes:      Extraocular Movements: Extraocular movements intact.      Conjunctiva/sclera: Conjunctivae normal.      Pupils: Pupils are equal, round, and reactive to light.   Neck:      Vascular: No carotid bruit.   Cardiovascular:      Rate and Rhythm: Normal rate and regular rhythm.      Pulses: Normal pulses.      Heart sounds: Normal heart sounds. No murmur (Rate is 66) heard.  Pulmonary:      Effort: Pulmonary effort is normal.      Breath sounds: Normal breath sounds.   Abdominal:      General: Abdomen is flat. There is no distension.      Palpations: Abdomen is soft. There is no mass.      Tenderness: There is no abdominal tenderness.   Musculoskeletal:         General: No swelling.      Cervical back: Normal range of motion and neck supple. No tenderness.      Right lower leg: No edema.      Left lower leg: No edema.   Lymphadenopathy:      Cervical: No cervical adenopathy.   Skin:     General: Skin is warm and dry.      Findings: No rash.   Neurological:      Mental Status: She is alert and oriented to " person, place, and time.      Cranial Nerves: No cranial nerve deficit.      Motor: No weakness.      Coordination: Coordination normal.      Deep Tendon Reflexes: Reflexes abnormal.   Psychiatric:         Mood and Affect: Mood normal.         Behavior: Behavior normal.         Thought Content: Thought content normal.         Judgment: Judgment normal.       Medications have been reviewed by provider in current encounter    Brandon Pete MD

## 2024-04-19 NOTE — ASSESSMENT & PLAN NOTE
Reviewed hospital reports and reconciled meds.  Continue current meds and follow-up and does have referral for cardiac rehab

## 2024-04-19 NOTE — ASSESSMENT & PLAN NOTE
Was diagnosed with type 2 diabetes.  Discussed dietary changes.  Should only be checking blood sugar in the morning at this time and fasting sugars seem to be good by report  Lab Results   Component Value Date    HGBA1C 7.9 (H) 03/22/2024

## 2024-04-19 NOTE — ASSESSMENT & PLAN NOTE
Will repeat thyroid levels.  Has been taking 25 mcg supplement and I feel will probably need to increase this

## 2024-04-19 NOTE — ASSESSMENT & PLAN NOTE
Reviewed hospital reports and reconciled medication.  Does seem much more comfortable at this time and will continue with the protonic and the Carafate.  Did make referral for GI for follow-up and will recheck CBC.  Iron levels appeared adequate in the hospital stay

## 2024-04-19 NOTE — PATIENT INSTRUCTIONS
Overall seems to be doing much better than I would expect.  Needs to only check the fingerstick blood sugar in the morning fasting.  Is going to be starting cardiac rehab in about 3 weeks and already does have the referral in.  Will check CBC for history of the blood loss and recheck thyroid levels which will probably need an additional increase in the supplement.  Will also order lipid panel and has been started on Lipitor 80 mg.  Try to push activity as tolerated.  Does not need to take the Lasix unless weight increases 3 pounds

## 2024-04-19 NOTE — ASSESSMENT & PLAN NOTE
Has had problems with acute blood loss as well as hypotension and cardiac dysrhythmia.  Does appear stable at this time

## 2024-04-22 ENCOUNTER — LAB (OUTPATIENT)
Dept: LAB | Facility: CLINIC | Age: 64
End: 2024-04-22
Payer: COMMERCIAL

## 2024-04-22 DIAGNOSIS — R55 SYNCOPE AND COLLAPSE: ICD-10-CM

## 2024-04-22 DIAGNOSIS — D62 ANEMIA DUE TO ACUTE BLOOD LOSS: ICD-10-CM

## 2024-04-22 DIAGNOSIS — E03.9 ACQUIRED HYPOTHYROIDISM: ICD-10-CM

## 2024-04-22 DIAGNOSIS — E78.00 HYPERCHOLESTEROLEMIA: ICD-10-CM

## 2024-04-22 LAB
BASOPHILS # BLD AUTO: 0.06 THOUSANDS/ÂΜL (ref 0–0.1)
BASOPHILS NFR BLD AUTO: 1 % (ref 0–1)
CHOLEST SERPL-MCNC: 142 MG/DL
EOSINOPHIL # BLD AUTO: 0.55 THOUSAND/ÂΜL (ref 0–0.61)
EOSINOPHIL NFR BLD AUTO: 7 % (ref 0–6)
ERYTHROCYTE [DISTWIDTH] IN BLOOD BY AUTOMATED COUNT: 14.7 % (ref 11.6–15.1)
HCT VFR BLD AUTO: 33.6 % (ref 34.8–46.1)
HDLC SERPL-MCNC: 45 MG/DL
HGB BLD-MCNC: 10.5 G/DL (ref 11.5–15.4)
IMM GRANULOCYTES # BLD AUTO: 0.02 THOUSAND/UL (ref 0–0.2)
IMM GRANULOCYTES NFR BLD AUTO: 0 % (ref 0–2)
LDLC SERPL CALC-MCNC: 70 MG/DL (ref 0–100)
LYMPHOCYTES # BLD AUTO: 1.87 THOUSANDS/ÂΜL (ref 0.6–4.47)
LYMPHOCYTES NFR BLD AUTO: 25 % (ref 14–44)
MCH RBC QN AUTO: 29.9 PG (ref 26.8–34.3)
MCHC RBC AUTO-ENTMCNC: 31.3 G/DL (ref 31.4–37.4)
MCV RBC AUTO: 96 FL (ref 82–98)
MONOCYTES # BLD AUTO: 0.56 THOUSAND/ÂΜL (ref 0.17–1.22)
MONOCYTES NFR BLD AUTO: 8 % (ref 4–12)
NEUTROPHILS # BLD AUTO: 4.42 THOUSANDS/ÂΜL (ref 1.85–7.62)
NEUTS SEG NFR BLD AUTO: 59 % (ref 43–75)
NONHDLC SERPL-MCNC: 97 MG/DL
NRBC BLD AUTO-RTO: 0 /100 WBCS
PLATELET # BLD AUTO: 317 THOUSANDS/UL (ref 149–390)
PMV BLD AUTO: 9.6 FL (ref 8.9–12.7)
RBC # BLD AUTO: 3.51 MILLION/UL (ref 3.81–5.12)
T4 FREE SERPL-MCNC: 0.5 NG/DL (ref 0.61–1.12)
TRIGL SERPL-MCNC: 137 MG/DL
TSH SERPL DL<=0.05 MIU/L-ACNC: 17.07 UIU/ML (ref 0.45–4.5)
WBC # BLD AUTO: 7.48 THOUSAND/UL (ref 4.31–10.16)

## 2024-04-22 PROCEDURE — 36415 COLL VENOUS BLD VENIPUNCTURE: CPT

## 2024-04-22 PROCEDURE — 84439 ASSAY OF FREE THYROXINE: CPT

## 2024-04-22 PROCEDURE — 85025 COMPLETE CBC W/AUTO DIFF WBC: CPT

## 2024-04-22 PROCEDURE — 84443 ASSAY THYROID STIM HORMONE: CPT

## 2024-04-22 PROCEDURE — 80061 LIPID PANEL: CPT

## 2024-04-23 RX ORDER — LEVOTHYROXINE SODIUM 0.05 MG/1
50 TABLET ORAL DAILY
Qty: 30 TABLET | Refills: 5 | Status: ON HOLD | OUTPATIENT
Start: 2024-04-23

## 2024-04-23 NOTE — RESULT ENCOUNTER NOTE
Please call the patient regarding her abnormal result.  The cholesterol levels are fine.  The thyroid levels are still low and needs to increase the supplement from 25 mcg to 50 mcg.  I did send this in.  I cannot repeat the A1c until 6/22 as the last one was done on 3/22 and this is a 3-month blood test and cannot be done before

## 2024-04-29 ENCOUNTER — PATIENT MESSAGE (OUTPATIENT)
Dept: FAMILY MEDICINE CLINIC | Facility: CLINIC | Age: 64
End: 2024-04-29

## 2024-04-29 ENCOUNTER — NURSE TRIAGE (OUTPATIENT)
Age: 64
End: 2024-04-29

## 2024-04-29 ENCOUNTER — TELEPHONE (OUTPATIENT)
Age: 64
End: 2024-04-29

## 2024-04-29 ENCOUNTER — OFFICE VISIT (OUTPATIENT)
Dept: URGENT CARE | Facility: CLINIC | Age: 64
End: 2024-04-29
Payer: COMMERCIAL

## 2024-04-29 VITALS
TEMPERATURE: 97.3 F | WEIGHT: 210 LBS | SYSTOLIC BLOOD PRESSURE: 136 MMHG | HEART RATE: 100 BPM | OXYGEN SATURATION: 99 % | BODY MASS INDEX: 31.83 KG/M2 | RESPIRATION RATE: 18 BRPM | HEIGHT: 68 IN | DIASTOLIC BLOOD PRESSURE: 76 MMHG

## 2024-04-29 DIAGNOSIS — F41.9 ANXIETY: Primary | ICD-10-CM

## 2024-04-29 PROCEDURE — 99213 OFFICE O/P EST LOW 20 MIN: CPT

## 2024-04-29 NOTE — TELEPHONE ENCOUNTER
"Answer Assessment - Initial Assessment Questions  1. CONCERN: \"What happened that made you call today?\"      Panic about having another heart attack  2. ANXIETY SYMPTOM SCREENING: \"Can you describe how you have been feeling?\"  (e.g., tense, restless, panicky, anxious, keyed up, trouble sleeping, trouble concentrating)      Can't stop crying, did not sleep last night, worrying about another heart attack. Shaking   3. ONSET: \"How long have you been feeling this way?\"      Since last night   4. RECURRENT: \"Have you felt this way before?\"  If Yes, ask: \"What happened that time?\" \"What helped these feelings go away in the past?\"       Denies   5. RISK OF HARM - SUICIDAL IDEATION:  \"Do you ever have thoughts of hurting or killing yourself?\"  (e.g., yes, no, no but preoccupation with thoughts about death)    - INTENT:  \"Do you have thoughts of hurting or killing yourself right NOW?\" (e.g., yes, no, N/A)    - PLAN: \"Do you have a specific plan for how you would do this?\" (e.g., gun, knife, overdose, no plan, N/A)      Denies   6. RISK OF HARM - HOMICIDAL IDEATION:  \"Do you ever have thoughts of hurting or killing someone else?\"  (e.g., yes, no, no but preoccupation with thoughts about death)    - INTENT:  \"Do you have thoughts of hurting or killing someone right NOW?\" (e.g., yes, no, N/A)    - PLAN: \"Do you have a specific plan for how you would do this?\" (e.g., gun, knife, no plan, N/A)       Denies   7. FUNCTIONAL IMPAIRMENT: \"How have things been going for you overall? Have you had more difficulty than usual doing your normal daily activities?\"  (e.g., better, same, worse; self-care, school, work, interactions)      More difficult today  8. SUPPORT: \"Who is with you now?\" \"Who do you live with?\" \"Do you have family or friends who you can talk to?\"       Live byself, has 2 cats. Has daughter, was living with her for a month- spoke to her today  9. THERAPIST: \"Do you have a counselor or therapist? Name?\"      Pending " "psychiatrist   10. STRESSORS: \"Has there been any new stress or recent changes in your life?\"        Heart attack- 3/22/24  11. CAFFEINE USE: \"Do you drink caffeinated beverages, and how much each day?\" (e.g., coffee, tea, abdi)        Denies   12. ALCOHOL USE OR SUBSTANCE USE (DRUG USE): \"Do you drink alcohol or use any illegal drugs?\"        Denies   13. OTHER SYMPTOMS: \"Do you have any other physical symptoms right now?\" (e.g., chest pain, palpitations, difficulty breathing, fever)        Elevated HR- 100's, feels like heart is racing, hard to take deep breaths    Protocols used: Anxiety and Panic Attack-ADULT-[place]    "

## 2024-04-29 NOTE — TELEPHONE ENCOUNTER
Pt was seen at [facility] now today but was not prescribed with any medication. And was asked to call in the primary care doctor. She was crying and mentioned need to speak to Dr. Pete. Kindly see the appt notes from [facility] appt and call her back please.

## 2024-04-29 NOTE — TELEPHONE ENCOUNTER
Attempted to reach the patient to see if she wanted to see one of the providers in the [facility] office who are covering for Dr. Pete. Left contact information for the office and will double book patient if needed,

## 2024-04-29 NOTE — PATIENT INSTRUCTIONS
Referral placed to Psychiatric Services  Crisis Resources provided   Follow up with PCP in 3-5 days.  Proceed to  ER if symptoms worsen.    If tests have been performed at [facility], our office will contact you with results if changes need to be made to the care plan discussed with you at the visit.  You can review your full results on [facility] MyChart.    Anxiety   AMBULATORY CARE:   Anxiety  is a condition that causes you to feel extremely worried or nervous. The feelings are so strong that they can cause problems with your daily activities or sleep. Anxiety may be triggered by something you fear, or it may happen without a cause. Family or work stress, smoking, caffeine, and alcohol can increase your risk for anxiety. Certain medicines or health conditions can also increase your risk. Anxiety can become a long-term condition if it is not managed or treated.  Common signs and symptoms:   Fatigue or muscle tightness    Shaking, restlessness, or irritability    Problems focusing    Trouble sleeping    Feeling jumpy, easily startled, or dizzy    Rapid heartbeat or shortness of breath    Call your local emergency number (911 in the [place]) if:   You have chest pain, tightness, or heaviness that may spread to your shoulders, arms, jaw, neck, or back.    You think about harming yourself or someone else.    Call your therapist or doctor if:   Your symptoms get worse or do not get better with treatment.    Your anxiety keeps you from doing your regular daily activities.    You have new symptoms since your last visit.    You have questions or concerns about your condition or care.    The following resources are available at any time to help you, if needed:   Contact a suicide prevention organization:        For the [employer] Suicide and Crisis Lifeline:     Call or text zulily     Send a chat on https://Qosmos.org/chat     Call 3-257-952-3881 (1-800-273-TALK)    For the Suicide Hotline, call 1-725.345.4742 (1-839-TLVXWDQ)    For  a list of international numbers: https://save.org/find-help/international-resources/  Treatment for anxiety  depends on how severe your symptoms are. The following are common treatments for anxiety:  Cognitive behavioral therapy (CBT)  teaches you how to identify and change negative thought patterns.    Anxiety or antidepressant medicine  may help relieve or prevent anxiety. You may need to take the medicine for several weeks before you begin to feel better. Tell your healthcare provider about any side effects or problems you have with your medicine. The type or amount of medicine may need to be changed. Medicines are usually used along with therapy.    Self-care:   Talk to someone about your anxiety.  Your healthcare provider may suggest counseling. You might feel more comfortable talking with a friend or family member about your anxiety. Choose someone you know will be supportive and encouraging.    Get regular physical activity.  Physical activity can lower your stress, improve your mood, and help you sleep better. Work with your healthcare provider to develop a plan that you enjoy.         Create a regular sleep schedule.  A routine can help you relax before bed. Listen to music, read, or do yoga. Try to go to bed and wake up at the same time every day. Sleep is important for emotional health.    Find ways to relax.  Activities such as meditation or listening to music can help you relax. Spend time with friends, or do things you enjoy.    Do activities you enjoy.  Spend time with friends, or do something fun. Choose activities you are familiar with or comfortable doing. This may help prevent anxiety.    Practice deep breathing.  Deep breathing can help you relax when you feel anxious. Focus on taking slow, deep breaths several times a day, or during an anxiety attack. Breathe in through your nose and out through your mouth. Deep breathing combined with meditation or listening to music may help you feel  calmer.    Do not have caffeine.  Caffeine can make your symptoms worse. Do not have foods or drinks that are meant to increase your energy level.    Do not drink alcohol or use drugs.  Alcohol and drugs can worsen anxiety or make it hard to manage. Talk to your therapist or healthcare provider if you need help to quit.       Follow up with your therapist or doctor as directed:  Your healthcare provider will monitor your progress at follow-up visits. Your provider will also monitor your medicine if you take antidepressants and ask if the medicine is helping. Tell your provider about any side effects or problems you have with your medicine. The type or amount of medicine may need to be changed. Write down your questions so you remember to ask them during your visits.  For more information or support:   National [place] [facility] Mental [facility]ness  [address]  [place] [address]  Phone: 4- 144 - [phone]  Phone: 4- 581 - [phone]  Web Address: http://www.leroy.DoubleRecall  2 Suicide and Crisis Lifeline  [address]  [place] [address]  Phone: 0- 915 - 772  Web Address: http://www.suicidepreventionlifeline.org OR https://Petta.org/chat/    © Copyright Merative 2023 Information is for End User's use only and may not be sold, redistributed or otherwise used for commercial purposes.  The above information is an  only. It is not intended as medical advice for individual conditions or treatments. Talk to your doctor, nurse or pharmacist before following any medical regimen to see if it is safe and effective for you.

## 2024-04-29 NOTE — TELEPHONE ENCOUNTER
Patient called in crying hysterically and feeling like she could not take deep breaths.  She said she was up all night panicking about having another heart attack, and thinking about everything she went through.    After talking with patient, she did seem to calm down.  She is awaiting call backs from PCP about coming in for visit and has a referral for psychiatry as she went to urgent care today and was referred.    I advised patient if she feels worse, has shortness of breath, then she should go to ED for evaluation.

## 2024-04-29 NOTE — PATIENT COMMUNICATION
Per other telephone encounter patient was seen at urgent care. Manager Melly tried to reach out to her to schedule with the [place] location if she did not want to wait until her appointment Wednesday to see Dr. Pete. If patient calls back we will schedule her for Kim Castle or Dr. Eldridge.

## 2024-04-29 NOTE — PROGRESS NOTES
"  [facility]        NAME: Vesna Langston is a 64 y.o. female  : 1960    MRN: [id number]  DATE: 2024  TIME: 1:58 PM    Assessment and Plan   Anxiety [F41.9]  1. Anxiety  Ambulatory referral to Psych Services        No active SI/HI. Patient reports feeling \"better\" after talking about emotions and traumatic event today in clinic. Recommend talk therapy and referral placed to Psychiatric Services. Recommend close follow up with PCP in 3-5 days as do not feel comfortable prescribing benzodiazepine for short-term use given recent significant cardiac history. Patient to call PCP today. Encouraged continued supportive measures.  Crisis Resources provided. Close follow up with PCP in 2-3 days or proceed to emergency department for worsening symptoms.  Patient verbalized understanding of instructions given.       Patient Instructions     Patient Instructions   Referral placed to Psychiatric Services  Crisis Resources provided   Follow up with PCP in 3-5 days.  Proceed to  ER if symptoms worsen.    If tests have been performed at [facility], our office will contact you with results if changes need to be made to the care plan discussed with you at the visit.  You can review your full results on [facility] MyChart.    Anxiety   AMBULATORY CARE:   Anxiety  is a condition that causes you to feel extremely worried or nervous. The feelings are so strong that they can cause problems with your daily activities or sleep. Anxiety may be triggered by something you fear, or it may happen without a cause. Family or work stress, smoking, caffeine, and alcohol can increase your risk for anxiety. Certain medicines or health conditions can also increase your risk. Anxiety can become a long-term condition if it is not managed or treated.  Common signs and symptoms:   Fatigue or muscle tightness    Shaking, restlessness, or irritability    Problems focusing    Trouble sleeping    Feeling jumpy, easily startled, or " dizzy    Rapid heartbeat or shortness of breath    Call your local emergency number (911 in the [place]) if:   You have chest pain, tightness, or heaviness that may spread to your shoulders, arms, jaw, neck, or back.    You think about harming yourself or someone else.    Call your therapist or doctor if:   Your symptoms get worse or do not get better with treatment.    Your anxiety keeps you from doing your regular daily activities.    You have new symptoms since your last visit.    You have questions or concerns about your condition or care.    The following resources are available at any time to help you, if needed:   Contact a suicide prevention organization:        For the [employer] Suicide and Crisis Lifeline:     Call or text rSmart     Send a chat on https://Flexcomorg/chat     Call 2-731-689-1892 (1-800-273-TALK)    For the Suicide Hotline, call 6-460-839-6266 (4-707-KIZYXPT)    For a list of international numbers: https://save.org/find-help/international-resources/  Treatment for anxiety  depends on how severe your symptoms are. The following are common treatments for anxiety:  Cognitive behavioral therapy (CBT)  teaches you how to identify and change negative thought patterns.    Anxiety or antidepressant medicine  may help relieve or prevent anxiety. You may need to take the medicine for several weeks before you begin to feel better. Tell your healthcare provider about any side effects or problems you have with your medicine. The type or amount of medicine may need to be changed. Medicines are usually used along with therapy.    Self-care:   Talk to someone about your anxiety.  Your healthcare provider may suggest counseling. You might feel more comfortable talking with a friend or family member about your anxiety. Choose someone you know will be supportive and encouraging.    Get regular physical activity.  Physical activity can lower your stress, improve your mood, and help you sleep better. Work with your  healthcare provider to develop a plan that you enjoy.         Create a regular sleep schedule.  A routine can help you relax before bed. Listen to music, read, or do yoga. Try to go to bed and wake up at the same time every day. Sleep is important for emotional health.    Find ways to relax.  Activities such as meditation or listening to music can help you relax. Spend time with friends, or do things you enjoy.    Do activities you enjoy.  Spend time with friends, or do something fun. Choose activities you are familiar with or comfortable doing. This may help prevent anxiety.    Practice deep breathing.  Deep breathing can help you relax when you feel anxious. Focus on taking slow, deep breaths several times a day, or during an anxiety attack. Breathe in through your nose and out through your mouth. Deep breathing combined with meditation or listening to music may help you feel calmer.    Do not have caffeine.  Caffeine can make your symptoms worse. Do not have foods or drinks that are meant to increase your energy level.    Do not drink alcohol or use drugs.  Alcohol and drugs can worsen anxiety or make it hard to manage. Talk to your therapist or healthcare provider if you need help to quit.       Follow up with your therapist or doctor as directed:  Your healthcare provider will monitor your progress at follow-up visits. Your provider will also monitor your medicine if you take antidepressants and ask if the medicine is helping. Tell your provider about any side effects or problems you have with your medicine. The type or amount of medicine may need to be changed. Write down your questions so you remember to ask them during your visits.  For more information or support:   National [place] on Mental Illness  [address]  [place] [address]  Phone: 3- 893 - [phone]  Phone: 3- 568 - 851-8629  Web Address: http://www.leroy.[employer]  988 Suicide and Crisis Lifeline  [address]  [place]  [address]  Phone: 9- 639 - 321  Web Address: http://www.suicidepreventionlifeline.org OR https://Innovative Roads.org/chat/    © Copyright Merative 2023 Information is for End User's use only and may not be sold, redistributed or otherwise used for commercial purposes.  The above information is an  only. It is not intended as medical advice for individual conditions or treatments. Talk to your doctor, nurse or pharmacist before following any medical regimen to see if it is safe and effective for you.        Chief Complaint     Chief Complaint   Patient presents with    Panic Attack     Had massive heart attack March 22. . Has implanted defibrillator and pacemaker. And realized X 1 day ago the seriousness of the heart attack. Feels she's shaking and very anxious.          History of Present Illness       64-year-old female with a past medical history significant for type II DM, HF, C and CAD with MI and cardiac arrest in March presents with complaints of anxiety.  Patient reports suffering MI with cardiac arrest in March with fluctuating levels of consciousness.  Reports speaking with children yesterday regarding traumatic event, which has triggered increased anxiety.  Patient reports that when she thinks about the events and near death experience that she will become very tearful and feel incredibly anxious with elevated heart rate.  She denies a prior history of anxiety.  She recently changed PCP offices and has sent a portal message to her PCP but he is not in the office until Wednesday. She denies SI/HI. She is mainly looking for mental health resources.         Review of Systems   Review of Systems   Constitutional:  Negative for chills and fever.   Respiratory:  Negative for cough, shortness of breath and wheezing.    Cardiovascular:  Negative for chest pain.   Gastrointestinal:  Negative for abdominal pain, diarrhea, nausea and vomiting.   Musculoskeletal:  Negative for arthralgias and myalgias.    Skin:  Negative for rash.   Psychiatric/Behavioral:  Negative for suicidal ideas. The patient is nervous/anxious.          Current Medications       Current Outpatient Medications:     acetaminophen (TYLENOL) 650 mg CR tablet, Take 650 mg by mouth every 8 (eight) hours as needed, Disp: , Rfl:     albuterol (PROAIR HFA) 90 mcg/act inhaler, Inhale 2 puffs every 6 (six) hours as needed for wheezing, Disp: 8.5 g, Rfl: 0    amiodarone 200 mg tablet, Take 1 tablet (200 mg total) by mouth daily, Disp: , Rfl:     apixaban (ELIQUIS) 5 mg, Take 1 tablet (5 mg total) by mouth 2 (two) times a day, Disp: 60 tablet, Rfl: 2    atorvastatin (LIPITOR) 80 mg tablet, Take 1 tablet (80 mg total) by mouth every evening, Disp: 30 tablet, Rfl: 2    clopidogrel (PLAVIX) 75 mg tablet, Take 1 tablet (75 mg total) by mouth daily, Disp: 30 tablet, Rfl: 2    furosemide (LASIX) 20 mg tablet, Take 1 tablet (20 mg total) by mouth as needed (for rapid weight gain (3+ lbs overnight or 5+ lbs in 5-7 days) or worsening shortness of breath), Disp: 30 tablet, Rfl: 1    gabapentin (Neurontin) 100 mg capsule, Take 1 capsule (100 mg total) by mouth 2 (two) times a day, Disp: , Rfl:     levothyroxine (Synthroid) 50 mcg tablet, Take 1 tablet (50 mcg total) by mouth daily, Disp: 30 tablet, Rfl: 5    meclizine (ANTIVERT) 25 mg tablet, Take 1 tablet (25 mg total) by mouth every 8 (eight) hours as needed for dizziness, Disp: 30 tablet, Rfl: 0    metoprolol succinate (TOPROL-XL) 25 mg 24 hr tablet, Take 0.5 tablets (12.5 mg total) by mouth daily at bedtime, Disp: , Rfl:     Multiple Vitamin (MULTIVITAMIN) capsule, Take 1 capsule by mouth daily, Disp: , Rfl:     pantoprazole (PROTONIX) 40 mg tablet, Take 1 tablet (40 mg total) by mouth 2 (two) times a day before meals, Disp: 60 tablet, Rfl: 0    sucralfate (CARAFATE) 1 g tablet, Take 1 tablet (1 g total) by mouth 4 (four) times a day (before meals and at bedtime), Disp: 120 tablet, Rfl: 0    polyethylene glycol  (MIRALAX) 17 g packet, Take 17 g by mouth daily, Disp: , Rfl:     senna-docusate sodium (SENOKOT S) 8.6-50 mg per tablet, Take 2 tablets by mouth 2 (two) times a day, Disp: 30 tablet, Rfl: 0    Current Allergies     Allergies as of 04/29/2024 - Reviewed 04/29/2024   Allergen Reaction Noted    Shellfish-derived products - food allergy Anaphylaxis 06/10/2019    Azithromycin Rash 10/10/2012            The following portions of the patient's history were reviewed and updated as appropriate: allergies, current medications, past family history, past medical history, past social history, past surgical history and problem list.     Past Medical History:   Diagnosis Date    Acute respiratory insufficiency 03/22/2024    Allergic     Chronic HFrEF (heart failure with reduced ejection fraction) ([facility])     Coronary artery disease     COVID-19 virus infection 11/28/2022    Diabetes mellitus ([facility])     Disease of thyroid gland 4/09/2024    Hyperlipidemia     Hypoglycemia     ICD (implantable cardioverter-defibrillator) in place     Ischemic cardiomyopathy     Lactic acidosis 03/22/2024    Myocardial infarction ([facility]) 3/22/2024    Otitis media 1966    ST elevation myocardial infarction involving left anterior descending (LAD) coronary artery ([facility]) 03/22/2024    Tobacco abuse     Visual impairment 1967       Past Surgical History:   Procedure Laterality Date    ADENOIDECTOMY      CARDIAC CATHETERIZATION N/A 03/22/2024    Procedure: Cardiac pci;  Surgeon: Gabriel Myers MD;  [facility];  Service: Cardiology    CARDIAC CATHETERIZATION N/A 03/22/2024    Procedure: Cardiac Coronary Angiogram;  Surgeon: Gabriel Myers MD;  [facility];  Service: Cardiology    CARDIAC CATHETERIZATION N/A 03/22/2024    Procedure: Cardiac PCI AMI;  Surgeon: Gabriel Myers MD;  [facility];  Service: Cardiology    CARDIAC CATHETERIZATION N/A 03/22/2024    Procedure: Cardiac IVUS;  Surgeon: Gabriel Myers MD;   "[facility];  Service: Cardiology    CARDIAC ELECTROPHYSIOLOGY PROCEDURE N/A 2024    Procedure: Cardiac icd implant;  Surgeon: Jeffy Lama MD;  [facility];  Service: Cardiology     SECTION      ECTOPIC PREGNANCY SURGERY      SEPTOPLASTY      SPINE SURGERY  23    TONSILLECTOMY         Family History   Problem Relation Age of Onset    Depression Mother     Heart disease Father     OCD Daughter          Medications have been verified.        Objective   /76   Pulse 100   Temp (!) 97.3 °F (36.3 °C)   Resp 18   Ht 5' 8\" (1.727 m)   Wt 95.3 kg (210 lb)   SpO2 99%   BMI 31.93 kg/m²   No LMP recorded. Patient is postmenopausal.       Physical Exam     Physical Exam  Vitals and nursing note reviewed.   Constitutional:       General: She is not in acute distress.     Appearance: She is not toxic-appearing.   HENT:      Head: Normocephalic.   Eyes:      Conjunctiva/sclera: Conjunctivae normal.   Cardiovascular:      Rate and Rhythm: Normal rate and regular rhythm.      Heart sounds: Normal heart sounds.   Pulmonary:      Effort: Pulmonary effort is normal. No respiratory distress.      Breath sounds: Normal breath sounds. No stridor. No wheezing, rhonchi or rales.   Skin:     General: Skin is warm and dry.   Neurological:      Mental Status: She is alert and oriented to person, place, and time.      Gait: Gait is intact.   Psychiatric:         Attention and Perception: Attention normal.         Mood and Affect: Mood is anxious.         Speech: Speech normal.         Behavior: Behavior normal.         Thought Content: Thought content normal.      Comments: Tearful at times                    "

## 2024-04-30 ENCOUNTER — TELEPHONE (OUTPATIENT)
Dept: PSYCHIATRY | Facility: CLINIC | Age: 64
End: 2024-04-30

## 2024-04-30 NOTE — TELEPHONE ENCOUNTER
Received an email to contact patient regarding  services. Writer attempted to contact patient three times, but call was forwarded to voicemail each time. Lvm advising patient to contact Intake Dept at [phone].    If/when patient returns call, please reach out to this writer. Thank you.

## 2024-04-30 NOTE — TELEPHONE ENCOUNTER
Writer spoke with patient. Patient stated that she has an appt tomorrow with her PCP and will f/u with office if services are still needed.

## 2024-04-30 NOTE — TELEPHONE ENCOUNTER
The patient contacted the office, requesting to speak with the . The writer transferred the call to the  for assistance.

## 2024-05-01 ENCOUNTER — OFFICE VISIT (OUTPATIENT)
Dept: FAMILY MEDICINE CLINIC | Facility: CLINIC | Age: 64
End: 2024-05-01
Payer: COMMERCIAL

## 2024-05-01 VITALS
BODY MASS INDEX: 31.83 KG/M2 | SYSTOLIC BLOOD PRESSURE: 130 MMHG | DIASTOLIC BLOOD PRESSURE: 78 MMHG | WEIGHT: 210 LBS | HEIGHT: 68 IN

## 2024-05-01 DIAGNOSIS — F41.1 GENERALIZED ANXIETY DISORDER: ICD-10-CM

## 2024-05-01 DIAGNOSIS — E11.9 TYPE 2 DIABETES MELLITUS, WITHOUT LONG-TERM CURRENT USE OF INSULIN (HCC): Primary | ICD-10-CM

## 2024-05-01 DIAGNOSIS — J06.9 VIRAL UPPER RESPIRATORY TRACT INFECTION: ICD-10-CM

## 2024-05-01 PROCEDURE — 3075F SYST BP GE 130 - 139MM HG: CPT | Performed by: FAMILY MEDICINE

## 2024-05-01 PROCEDURE — 1111F DSCHRG MED/CURRENT MED MERGE: CPT | Performed by: FAMILY MEDICINE

## 2024-05-01 PROCEDURE — 1160F RVW MEDS BY RX/DR IN RCRD: CPT | Performed by: FAMILY MEDICINE

## 2024-05-01 PROCEDURE — 99214 OFFICE O/P EST MOD 30 MIN: CPT | Performed by: FAMILY MEDICINE

## 2024-05-01 PROCEDURE — 1159F MED LIST DOCD IN RCRD: CPT | Performed by: FAMILY MEDICINE

## 2024-05-01 PROCEDURE — 3078F DIAST BP <80 MM HG: CPT | Performed by: FAMILY MEDICINE

## 2024-05-01 RX ORDER — ESCITALOPRAM OXALATE 10 MG/1
10 TABLET ORAL DAILY
Qty: 30 TABLET | Refills: 5 | Status: ON HOLD | OUTPATIENT
Start: 2024-05-01 | End: 2024-10-28

## 2024-05-01 RX ORDER — ALBUTEROL SULFATE 90 UG/1
2 AEROSOL, METERED RESPIRATORY (INHALATION) EVERY 6 HOURS PRN
Qty: 8.5 G | Refills: 0 | Status: ON HOLD | OUTPATIENT
Start: 2024-05-01

## 2024-05-01 NOTE — PROGRESS NOTES
Name: Vesna Langston      : 1960      MRN: [id number]  Encounter Provider: Brandon Pete MD  Encounter Date: 2024   Encounter department: [facility] PRIMARY CARE    Assessment & Plan     1. Type 2 diabetes mellitus, without long-term current use of insulin (HCC)  Assessment & Plan:  Is not on any medication at this time but has been watching diet much more closely.  Try to push daily activity  Lab Results   Component Value Date    HGBA1C 7.9 (H) 2024         2. Viral upper respiratory tract infection  Assessment & Plan:  Should just use saline nasal rinse at bedtime and in the morning    Orders:  -     albuterol (ProAir HFA) 90 mcg/act inhaler; Inhale 2 puffs every 6 (six) hours as needed for wheezing    3. Generalized anxiety disorder  Assessment & Plan:  Discussed problem.  Will place on trial of Lexapro 10 mg to be taken with the evening meal    Orders:  -     escitalopram (LEXAPRO) 10 mg tablet; Take 1 tablet (10 mg total) by mouth daily           Subjective          Patient seen for first office check with transition of care.  Patient had 3 recent hospitalizations the first for MI then for problems with syncope and was found to be in atrial flutter.  Did have pacemaker placed with defibrillator.  Last hospitalization was for GI blood loss and was found to have severe ulcerations in the stomach.  Is on protonic at this time as well as Carafate and no longer having pain.  Was started on levothyroxine for problems with hypothyroidism while in the hospital.  Is following up with cardiology and interventional cardiology in [place] and is going to need to be started on cardiac rehab which is set up for evaluation in the second week of May.  Overall has felt overwhelmed with this and is stressed and does feel some elements of depression.  Is also had some mild cough and congestion although mucus is clear over the last several days several days      Review of Systems    Constitutional:  Positive for appetite change.   HENT:  Positive for congestion (Mucus is clear).    Eyes:  Negative for visual disturbance.   Respiratory:  Positive for cough (Slight). Negative for chest tightness and shortness of breath.    Cardiovascular:  Positive for leg swelling.   Gastrointestinal:  Negative for abdominal pain, constipation, diarrhea and nausea.   Endocrine: Negative for polyuria.   Neurological:  Positive for headaches.   Psychiatric/Behavioral:  Positive for sleep disturbance. Negative for suicidal ideas. The patient is nervous/anxious.        Current Outpatient Medications on File Prior to Visit   Medication Sig    acetaminophen (TYLENOL) 650 mg CR tablet Take 650 mg by mouth every 8 (eight) hours as needed    amiodarone 200 mg tablet Take 1 tablet (200 mg total) by mouth daily    apixaban (ELIQUIS) 5 mg Take 1 tablet (5 mg total) by mouth 2 (two) times a day    atorvastatin (LIPITOR) 80 mg tablet Take 1 tablet (80 mg total) by mouth every evening    clopidogrel (PLAVIX) 75 mg tablet Take 1 tablet (75 mg total) by mouth daily    furosemide (LASIX) 20 mg tablet Take 1 tablet (20 mg total) by mouth as needed (for rapid weight gain (3+ lbs overnight or 5+ lbs in 5-7 days) or worsening shortness of breath)    gabapentin (Neurontin) 100 mg capsule Take 1 capsule (100 mg total) by mouth 2 (two) times a day    levothyroxine (Synthroid) 50 mcg tablet Take 1 tablet (50 mcg total) by mouth daily    meclizine (ANTIVERT) 25 mg tablet Take 1 tablet (25 mg total) by mouth every 8 (eight) hours as needed for dizziness    metoprolol succinate (TOPROL-XL) 25 mg 24 hr tablet Take 0.5 tablets (12.5 mg total) by mouth daily at bedtime    Multiple Vitamin (MULTIVITAMIN) capsule Take 1 capsule by mouth daily    pantoprazole (PROTONIX) 40 mg tablet Take 1 tablet (40 mg total) by mouth 2 (two) times a day before meals    sucralfate (CARAFATE) 1 g tablet Take 1 tablet (1 g total) by mouth 4 (four) times a day  "(before meals and at bedtime)       Objective     Blood Pressure 130/78 (BP Location: Left arm, Patient Position: Sitting, Cuff Size: Large)   Height 5' 8\" (1.727 m)   Weight 95.3 kg (210 lb)   Body Mass Index 31.93 kg/m²     Physical Exam  Vitals and nursing note reviewed.   Constitutional:       Appearance: Normal appearance. She is well-developed.   HENT:      Head: Normocephalic.      Nose: Nose normal.      Mouth/Throat:      Mouth: Mucous membranes are moist.   Eyes:      Conjunctiva/sclera: Conjunctivae normal.   Neck:      Thyroid: No thyromegaly.   Cardiovascular:      Rate and Rhythm: Normal rate and regular rhythm.      Heart sounds: Normal heart sounds. No murmur (Rate is 66) heard.  Pulmonary:      Effort: Pulmonary effort is normal.      Breath sounds: Normal breath sounds.   Abdominal:      General: There is no distension.      Palpations: Abdomen is soft. There is no mass.      Tenderness: There is no abdominal tenderness.   Musculoskeletal:      Cervical back: Neck supple. No tenderness.      Left lower leg: No edema.   Lymphadenopathy:      Cervical: No cervical adenopathy.   Skin:     General: Skin is warm and dry.      Findings: No rash.   Neurological:      Mental Status: She is alert.      Motor: No weakness.      Coordination: Coordination normal.      Gait: Gait normal.      Deep Tendon Reflexes: Reflexes abnormal (Reflexes diminished).   Psychiatric:         Mood and Affect: Mood normal.       Brandon Pete MD    "

## 2024-05-01 NOTE — PATIENT INSTRUCTIONS
Discussed the problem with the anxiety which I feel is also showing mild depression.  Will place on trial of Lexapro to be taken with the evening meal note is due for checkup next Friday

## 2024-05-01 NOTE — PROGRESS NOTES
"Advanced Heart Failure/Pulmonary Hypertension Outpatient Note - Vesna Langston 64 y.o. female MRN: [id number]    @ Encounter: [id number]    Assessment:  64 y.o. female PMH and acute problems listed later in this note (a partial list may also be included within 'assessment' section) p/w HF fu.  I first met Vesna Langston on 5/6/24.    HFrEF, LVEF 35%,nondilated LV  LHC 03/22/2024: received PCI to 100% stenosis of ostial/proximal LAD.   cMRI 03/26/2024: LVEF 20%. LVIDd 4.5 cm. \"Transmural scarring of the mid anterior, anteroseptal and inferoseptal walls, the apical anterior, septal and inferior walls and the apex.\"   Coronary artery disease  S/p MI in 03/2024; received PCI to 100% stenosis of ostial/proximal LAD.  History of monomorphic ventricular tachycardia              Per EP notes, felt to be scar mediated.               S/p secondary prevention ICD.               Continues on amiodarone per EP.   Atrial flutter, paroxysmal               [id number] = 5 (age, sex, HF, CAD, DM).              Anticoagulation on Eliquis.               Rhythm control: amiodarone per EP.  Hyperlipidemia  Diabetes mellitus, type II  Chronic back pain  History of tobacco abuse  4/7/24 CT: IMPRESSION:  Suspected small hemorrhage from the anterior wall of the distal gastric body with focal wall thickening. Bleeding due to underlying lesion or PUD is suspected. Recommend endoscopic correlation.  Past heavy NSAID use, 2024 stopped      I have reviewed all pertinent patient data including but not limited to:        Lab Units 04/09/24  0539 04/08/24  0600 04/07/24  0634   CREATININE mg/dL 0.79 0.72 0.72         Lab Results   Component Value Date    K 3.8 04/09/2024     Lab Results   Component Value Date    HGBA1C 7.9 (H) 03/22/2024     Lab Results   Component Value Date    VSF0IJASGEFC 17.074 (H) 04/22/2024     Lab Results   Component Value Date    LDLCALC 70 04/22/2024     Lab Results   Component Value Date     (H) 04/01/2024      No results " "found for: \"NTBNP\"       TODAY'S PLAN:     05/06/24  I am meeting patient for the first time today  Warm, euvolemic  No new cardiac complaints, feels generally well    Gdmt below  Limited by hypotension, appears to have some room  Gently up metop today, if tolerates than start entresto and fu BMP 1 week. If BMP ok may start sglt2i via phone  Fu echo 8/2024 on max gdmt  Has ICD    On amio    On AC+plavix  On high intens statin    Follows GI    Thyroids and DM per PCP    Discussed therapeutic lifestyle changes, low-moderate intensity exercise, maintain acceptable hydration 64 oz water daily, salt restrict, Mediterranean vs DASH diet, weight loss  Avoid nsaids    Further review of return to work next week    Safest plan would be no driving x 3 mo - resume 6/2024 if remains stable    Follow up:  With me in 3-4 weeks  In addition to follow up with their other medical providers    Pharmacotherapies / Neurohormonal Blockade:  --Beta Blocker: metoprolol succinate 12.5 mg qHS>25 qd   --ARNi / ACEi / ARB:  (Entresto discontinued during 04/2024 admission due to hypotension). > re-attempt entresto 24/26 bid  --Aldosterone Antagonist: future  --SGLT2 Inhibitor: jardiance 25 qd for HF and DM; discussed risks/benefits/red flags/when to call clinic; no overt contraindications    --Diuretic: PRN Lasix 20 mg  Long discussion about evidence of worsening HF, when to self uptitrate home diuretic and call cardiology office     Sudden Cardiac Death Risk Reduction:  --Medtronic dual chamber ICD in situ since 03/2024 (secondary prevention).   --Interrogation from 04/12/2024:  MDT DUAL ICD/ ACTIVE SYSTEM IS MRI CONDITIONAL   DEVICE INTERROGATED IN THE [place] OFFICE: BATTERY VOLTAGE ADEQUATE-13 YRS. AP 2%  0%. ALL AVAILABLE LEAD PARAMETERS & TRENDS WITHIN NORMAL LIMITS. 1 DEVICE CLASSIFIED VHR NOTED; NO THERAPIES NEEDED. PT ON AMIO, METO SUCC, & ELIQUIS. EF 35% (ECHO 2024). OPTI-VOL FLUID THRESHOLDS INITIALIZING. NO PROGRAMMING CHANGES " MADE TO DEVICE PARAMETERS. WOUND CHECK: INCISION CLEAN AND DRY WITH EDGES APPROXIMATED; AQUACEL REMOVED. WOUND CARE AND RESTRICTIONS REVIEWED WITH PATIENT.   Electrical Resynchronization:  --Candidacy for BiV device: narrow QRS.      Advanced Therapies: Will continue to monitor.    Studies:  I have reviewed all pertinent patient data/labs/imaging where available, including but not limited to the below studies. Selected results may be displayed here but comprehensive listing is omitted for note clarity and can be found in the epic chart.    ECG.    Echo.    Stress.    Cath.    HPI:   64 y.o. female PMH and acute problems listed later in this note (a partial list may also be included within 'assessment' section) p/w HF fu.  I first met Vesna Kian on 5/6/24.  No new CP/SOB/dizziness/palpitations/syncope.  No new fatigue.  No new unintentional weight changes.  No new leg swelling, PND, pillow orthopnea.  No new fevers, chills, cough, nausea, vomiting, diarrhea, dysuria.      Interval History:   As noted in 'plan' section above and prior epic chart notes.    Past Medical History:   Diagnosis Date    Acute respiratory insufficiency 03/22/2024    Allergic     Chronic HFrEF (heart failure with reduced ejection fraction) ([facility])     Coronary artery disease     COVID-19 virus infection 11/28/2022    Diabetes mellitus ([facility])     Disease of thyroid gland 4/09/2024    Hyperlipidemia     Hypoglycemia     ICD (implantable cardioverter-defibrillator) in place     Ischemic cardiomyopathy     Lactic acidosis 03/22/2024    Myocardial infarction ([facility]) 3/22/2024    Otitis media 1966    ST elevation myocardial infarction involving left anterior descending (LAD) coronary artery ([facility]) 03/22/2024    Tobacco abuse     Visual impairment 1967     Patient Active Problem List    Diagnosis Date Noted    Viral upper respiratory tract infection 05/02/2024    Generalized anxiety disorder 05/02/2024    Anemia due to acute blood loss 04/19/2024     Acquired hypothyroidism 04/19/2024    Constipation 04/10/2024    Coffee ground emesis 04/07/2024    Type 2 diabetes mellitus, without long-term current use of insulin ([facility]) 04/07/2024    Syncope 04/01/2024    Guaiac positive stools 04/01/2024    S/P ICD (internal cardiac defibrillator) procedure 03/27/2024    Chronic low back pain 03/25/2024    HFrEF (heart failure with reduced ejection fraction) ([facility]) 03/25/2024    Ischemic cardiomyopathy 03/24/2024    Coronary artery disease involving native coronary artery of native heart 03/23/2024    S/P coronary artery stent placement 03/23/2024    Atrial flutter, paroxysmal ([facility]) 03/22/2024    Hyperlipemia 03/22/2024    New onset type 2 diabetes mellitus ([facility]) 03/22/2024    Tobacco abuse 03/22/2024    History of sustained ventricular tachycardia 03/22/2024    Symptoms of upper respiratory infection (URI) 03/04/2023    Back pain 07/31/2022    Sciatica of left side 07/31/2022       ROS:  10 point ROS negative except as specified in HPI/interval history    Allergies   Allergen Reactions    Shellfish-Derived Products - Food Allergy Anaphylaxis    Azithromycin Rash     Current Outpatient Medications   Medication Instructions    acetaminophen (TYLENOL) 650 mg, Oral, Every 8 hours PRN    albuterol (ProAir HFA) 90 mcg/act inhaler 2 puffs, Inhalation, Every 6 hours PRN    amiodarone 200 mg, Oral, Daily    apixaban (ELIQUIS) 5 mg, Oral, 2 times daily    atorvastatin (LIPITOR) 80 mg, Oral, Every evening    clopidogrel (PLAVIX) 75 mg, Oral, Daily    Empagliflozin (JARDIANCE) 25 mg, Oral, Every morning    escitalopram (LEXAPRO) 10 mg, Oral, Daily    furosemide (LASIX) 20 mg, Oral, As needed    gabapentin (NEURONTIN) 100 mg, Oral, 2 times daily    levothyroxine (SYNTHROID) 50 mcg, Oral, Daily    meclizine (ANTIVERT) 25 mg, Oral, Every 8 hours PRN    metoprolol succinate (TOPROL-XL) 25 mg, Oral, Daily at bedtime    Multiple Vitamin (MULTIVITAMIN) capsule 1 capsule, Oral, Daily     pantoprazole (PROTONIX) 40 mg, Oral, 2 times daily before meals    sacubitril-valsartan (Entresto) 24-26 MG TABS 1 tablet, Oral, 2 times daily    sucralfate (CARAFATE) 1 g, Oral, 4 times daily (before meals and at bedtime)      Social History     Socioeconomic History    Marital status:      Spouse name: Not on file    Number of children: 3    Years of education: Not on file    Highest education level: Not on file   Occupational History    Not on file   Tobacco Use    Smoking status: Former     Current packs/day: 0.00     Average packs/day: 1 pack/day for 24.2 years (24.2 ttl pk-yrs)     Types: Cigarettes     Start date: 2000     Quit date: 3/22/2024     Years since quittin.1    Smokeless tobacco: Never    Tobacco comments:     Smoked 0.5-1 ppd; quit in 2024.    Vaping Use    Vaping status: Never Used   Substance and Sexual Activity    Alcohol use: Yes     Alcohol/week: 1.0 standard drink of alcohol     Types: 1 Shots of liquor per week     Comment: rarely    Drug use: Never    Sexual activity: Not Currently     Partners: Female     Birth control/protection: Post-menopausal   Other Topics Concern    Not on file   Social History Narrative    Not on file     Social Determinants of Health     Financial Resource Strain: Low Risk  (2023)    Received from [facility]    Overall Financial Resource Strain (CARDIA)     Difficulty of Paying Living Expenses: Not hard at all   Food Insecurity: No Food Insecurity (2024)    Hunger Vital Sign     Worried About Running Out of Food in the Last Year: Never true     Ran Out of Food in the Last Year: Never true   Transportation Needs: No Transportation Needs (2024)    PRAPARE - Transportation     Lack of Transportation (Medical): No     Lack of Transportation (Non-Medical): No   Physical Activity: Not on file   Stress: No Stress Concern Present (2023)    Received from [facility]    [place] [employer] Occupational  "Health - Occupational Stress Questionnaire     Feeling of Stress : Not at all   Social Connections: Moderately Isolated (2/20/2023)    Received from [facility]    Social Connection and Isolation Panel [NHANES]     Frequency of Communication with Friends and Family: More than three times a week     Frequency of Social Gatherings with Friends and Family: Once a week     Attends Alevism Services: More than 4 times per year     Active Member of Clubs or Organizations: No     Attends Club or Organization Meetings: Never     Marital Status:    Intimate Partner Violence: Not At Risk (2/20/2023)    Received from [facility]    Humiliation, Afraid, Rape, and Kick questionnaire     Fear of Current or Ex-Partner: No     Emotionally Abused: No     Physically Abused: No     Sexually Abused: No   Housing Stability: Low Risk  (4/8/2024)    Housing Stability Vital Sign     Unable to Pay for Housing in the Last Year: No     Number of Places Lived in the Last Year: 2     Unstable Housing in the Last Year: No     Family History   Problem Relation Age of Onset    Depression Mother     Heart disease Father     OCD Daughter        Physical Exam:  Vitals:    05/06/24 1405   BP: 122/64   BP Location: Right arm   Patient Position: Sitting   Cuff Size: Standard   Pulse: 71   SpO2: 98%   Weight: 93.6 kg (206 lb 6.4 oz)   Height: 5' 8\" (1.727 m)     Constitutional: NAD, non toxic  Ears/nose/mouth/throat: atraumatic  CV: RRR, nl S1S2, no murmurs/rubs/gallups, no JVD, no HJR  Resp: CTABL  GI: Soft, NTND  MSK: no swollen joints in exposed areas  Extr: No edema, warm LE  Pysche: Normal affect  Neuro: appropriate in conversation  Skin: dry and intact in exposed areas    Labs & Results:  Lab Results   Component Value Date    SODIUM 137 04/09/2024    K 3.8 04/09/2024     04/09/2024    CO2 26 04/09/2024    BUN 10 04/09/2024    CREATININE 0.79 04/09/2024    GLUC 98 04/09/2024    CALCIUM 8.1 (L) " 04/09/2024     Lab Results   Component Value Date    WBC 7.48 04/22/2024    HGB 10.5 (L) 04/22/2024    HCT 33.6 (L) 04/22/2024    MCV 96 04/22/2024     04/22/2024       Counseling / Coordination of Care  Time spent today 27 minutes.  Greater than 50% of total time was spent with the patient and / or family counseling and / or coordination of care.  We discussed diagnoses, most recent studies, tests and any changes in treatment plan.    Thank you for the opportunity to participate in the care of this patient.    Vitor Bell MD  Attending Physician  Advanced Heart Failure and Transplant Cardiology  [facility]

## 2024-05-02 PROBLEM — J06.9 VIRAL UPPER RESPIRATORY TRACT INFECTION: Status: ACTIVE | Noted: 2024-05-02

## 2024-05-02 PROBLEM — F41.1 GENERALIZED ANXIETY DISORDER: Status: ACTIVE | Noted: 2024-05-02

## 2024-05-02 NOTE — ASSESSMENT & PLAN NOTE
Is not on any medication at this time but has been watching diet much more closely.  Try to push daily activity  Lab Results   Component Value Date    HGBA1C 7.9 (H) 03/22/2024

## 2024-05-03 ENCOUNTER — OFFICE VISIT (OUTPATIENT)
Dept: GASTROENTEROLOGY | Facility: CLINIC | Age: 64
End: 2024-05-03

## 2024-05-03 VITALS
DIASTOLIC BLOOD PRESSURE: 78 MMHG | WEIGHT: 210 LBS | SYSTOLIC BLOOD PRESSURE: 122 MMHG | HEIGHT: 68 IN | TEMPERATURE: 97.9 F | BODY MASS INDEX: 31.83 KG/M2

## 2024-05-03 DIAGNOSIS — K25.9 MULTIPLE GASTRIC ULCERS: Primary | ICD-10-CM

## 2024-05-03 DIAGNOSIS — D62 ACUTE BLOOD LOSS ANEMIA: ICD-10-CM

## 2024-05-03 RX ORDER — SUCRALFATE 1 G/1
1 TABLET ORAL
Qty: 120 TABLET | Refills: 3 | Status: ON HOLD | OUTPATIENT
Start: 2024-05-03

## 2024-05-03 RX ORDER — PANTOPRAZOLE SODIUM 40 MG/1
40 TABLET, DELAYED RELEASE ORAL
Qty: 60 TABLET | Refills: 3 | Status: ON HOLD | OUTPATIENT
Start: 2024-05-03

## 2024-05-03 NOTE — PROGRESS NOTES
[facility] - Outpatient Follow-up Note  Vesna Langston 64 y.o. female MRN: [id number]  Encounter: [id number]  ASSESSMENT AND PLAN:    1. Multiple gastric ulcers  2. Acute blood loss anemia   Patient with recent MI in March 2024 with subsequent ischemic cardiomyopathy requiring ICD placement now on Eliquis and Plavix presenting for hospital follow-up of acute blood loss anemia and coffee-ground emesis.  We reviewed the results of her EGD during hospital stay which showed multiple gastric ulcers and an esophageal ulcer.  Patient expressed understanding to these results.  I provided education on gastric ulcers.  Her ulcers were likely in the setting of heavy NSAID use for the last year due to back pain.  She is currently feeling much better.  No acute complaints or alarm symptoms today.  Her hemoglobin has improved.  She has no signs of overt GI bleeding.    At this time I recommend patient continue with pantoprazole 40 mg twice daily before meals as well as sucralfate/Carafate tablets before meals for the next 2 to 3 months.  In addition to these medications we discussed the importance that she avoids NSAIDs indefinitely, especially while on blood thinners.  She expressed understanding to this.  We reviewed the importance of a GERD/gastritis diet.  I recommended she avoid fatty, greasy, spicy foods, limit excess caffeine, chocolate, alcohol.  She will do this.  She will obtain a CBC prior to her next office visit with me.  Discussed with the patient that she would benefit from eventual repeat EGD, but given recent MI and need for cardiac stenting, ICD placement, blood thinners, we will hold off on this at this time and consider in about 3 to 6 months time.  She expressed understanding to this and is in agreement that she would like to hold off on repeat EGD for now.  Eventual EGD will need to be done in the hospital setting with cardiac clearance prior, possibly in the OR.      - Ambulatory  Referral to Gastroenterology  - pantoprazole (PROTONIX) 40 mg tablet; Take 1 tablet (40 mg total) by mouth 2 (two) times a day before meals  Dispense: 60 tablet; Refill: 3  - sucralfate (CARAFATE) 1 g tablet; Take 1 tablet (1 g total) by mouth 4 (four) times a day (before meals and at bedtime)  Dispense: 120 tablet; Refill: 3  - CBC; Future      Patient was instructed to call the office with any questions, concerns, new/ worsening/ persisting GI symptoms. Advised patient go to the ER with any severe or worsening abdominal pain, fevers/ chills, intractable N/V, chest pain, SOB, dizziness, lightheadedness, feeling something stuck in esophagus that will not go down. Patient expressed understanding and is in agreement with treatment plan.     Will plan to follow up in ~ 3 months   __________________________________________________________    SUBJECTIVE:    Patient is new to me.  Patient with a past medical history of a flutter on Eliquis, ischemic cardiomyopathy withheart failure with reduced ejection fraction with ICD in place (placed in 3/2024), coronary artery disease with recent cardiac stent placement 3/2024 on Plavix, hypothyroidism, type 2 diabetes mellitus, anxiety, tobacco abuse, hyperlipidemia presents to the office today for hospital follow-up.  She is currently taking pantoprazole 40 mg twice daily and Carafate tablets 4 times a day before meals and at bedtime.  Patient was admitted 4/6-4/10 at [facility].  Discharge summary was reviewed.  She presented with syncope and coffee-ground emesis.  There is associated hemoglobin drop from 14 down to 8.4.  Her anticoagulation was reversed with Kcentra.  GI was consulted and patient ultimately underwent EGD 4/7/2024, this was reviewed, this showed a fresh linear deep erosion in the distal esophagus at the GE junction.  There is also fresh horizontal white-based gastric ulcer in the antrum under an inflamed fold.  There is another small fresh  gastric ulceration noted in an adjacent area.  There is no evidence of any active bleeding.  Duodenum was normal.  It was recommended patient be on pantoprazole 40 mg twice daily, Carafate, avoid NSAIDs.    Patient tells me that she had been using Motrin heavily for the last 1.5 years due to back pain.   Patient reports feeling better since hospital stay.   She is eating and drinking OK. Her appetite has been slowly improving. She is trying to watch her diet and eat more bland foods.   She is taking pantoprazole 40 mg BID before meals and carafate tablets before meals.   Bms are regular, formed.   Patient denies any fevers/ chills, abdominal pain, nausea, vomiting, change in bowel habits, diarrhea, constipation, black or bloody stools, heartburn, dysphagia, odynophagia.   Denies chest pain, SOB, dizziness    Patient reports she plans to complete Cologuard in the future       Review of Systems   Constitutional:  Negative for chills and fever.   HENT:  Negative for ear pain and sore throat.    Eyes:  Negative for pain and visual disturbance.   Respiratory:  Negative for cough and shortness of breath.    Cardiovascular:  Negative for chest pain and palpitations.   Gastrointestinal:  Negative for constipation and diarrhea.   Genitourinary:  Negative for dysuria, frequency and hematuria.   Musculoskeletal:  Negative for arthralgias, back pain and myalgias.   Skin:  Negative for color change and rash.   Neurological:  Negative for seizures, syncope and headaches.   All other systems reviewed and are negative.         Historical Information   Past Medical History:   Diagnosis Date    Acute respiratory insufficiency 03/22/2024    Allergic     Chronic HFrEF (heart failure with reduced ejection fraction) (HCC)     Coronary artery disease     COVID-19 virus infection 11/28/2022    Diabetes mellitus (HCC)     Disease of thyroid gland 4/09/2024    Hyperlipidemia     Hypoglycemia     ICD (implantable cardioverter-defibrillator)  in place     Ischemic cardiomyopathy     Lactic acidosis 2024    Myocardial infarction (HCC) 3/22/2024    Otitis media 1966    ST elevation myocardial infarction involving left anterior descending (LAD) coronary artery (HCC) 2024    Tobacco abuse     Visual impairment 1967     Past Surgical History:   Procedure Laterality Date    ADENOIDECTOMY      CARDIAC CATHETERIZATION N/A 2024    Procedure: Cardiac pci;  Surgeon: Gabriel Myers MD;  Location: [facility];  Service: Cardiology    CARDIAC CATHETERIZATION N/A 2024    Procedure: Cardiac Coronary Angiogram;  Surgeon: Gabriel Myers MD;  [facility]: [facility];  Service: Cardiology    CARDIAC CATHETERIZATION N/A 2024    Procedure: Cardiac PCI AMI;  Surgeon: Gabriel Myers MD;  [facility]: [facility];  Service: Cardiology    CARDIAC CATHETERIZATION N/A 2024    Procedure: Cardiac IVUS;  Surgeon: Gabriel Myers MD;  [facility];  Service: Cardiology    CARDIAC ELECTROPHYSIOLOGY PROCEDURE N/A 2024    Procedure: Cardiac icd implant;  Surgeon: Jeffy Lama MD;  Location: [facility];  Service: Cardiology     SECTION      ECTOPIC PREGNANCY SURGERY      SEPTOPLASTY      SPINE SURGERY  23    TONSILLECTOMY       Social History   Social History     Substance and Sexual Activity   Alcohol Use Yes    Alcohol/week: 1.0 standard drink of alcohol    Types: 1 Shots of liquor per week    Comment: rarely     Social History     Substance and Sexual Activity   Drug Use Never     Social History     Tobacco Use   Smoking Status Former    Current packs/day: 0.00    Average packs/day: 1 pack/day for 24.2 years (24.2 ttl pk-yrs)    Types: Cigarettes    Start date: 2000    Quit date: 3/22/2024    Years since quittin.1   Smokeless Tobacco Never   Tobacco Comments    Smoked 0.5-1 ppd; quit in 2024.      Family History   Problem Relation Age of Onset    Depression Mother     Heart disease  Father     OCD Daughter        Meds/Allergies       Current Outpatient Medications:     acetaminophen (TYLENOL) 650 mg CR tablet    albuterol (ProAir HFA) 90 mcg/act inhaler    amiodarone 200 mg tablet    apixaban (ELIQUIS) 5 mg    atorvastatin (LIPITOR) 80 mg tablet    clopidogrel (PLAVIX) 75 mg tablet    escitalopram (LEXAPRO) 10 mg tablet    furosemide (LASIX) 20 mg tablet    gabapentin (Neurontin) 100 mg capsule    levothyroxine (Synthroid) 50 mcg tablet    meclizine (ANTIVERT) 25 mg tablet    metoprolol succinate (TOPROL-XL) 25 mg 24 hr tablet    Multiple Vitamin (MULTIVITAMIN) capsule    pantoprazole (PROTONIX) 40 mg tablet    sucralfate (CARAFATE) 1 g tablet    Allergies   Allergen Reactions    Shellfish-Derived Products - Food Allergy Anaphylaxis    Azithromycin Rash           Objective     Wt Readings from Last 3 Encounters:   05/03/24 95.3 kg (210 lb)   05/01/24 95.3 kg (210 lb)   04/29/24 95.3 kg (210 lb)     Temp Readings from Last 3 Encounters:   05/03/24 97.9 °F (36.6 °C) (Tympanic)   04/29/24 (!) 97.3 °F (36.3 °C)   04/10/24 98 °F (36.7 °C)     BP Readings from Last 3 Encounters:   05/03/24 122/78   05/01/24 130/78   04/29/24 136/76     Pulse Readings from Last 3 Encounters:   04/29/24 100   04/19/24 65   04/17/24 79          PHYSICAL EXAM:      Physical Exam  Vitals reviewed.   Constitutional:       General: She is not in acute distress.     Appearance: She is obese. She is not toxic-appearing.   HENT:      Head: Normocephalic and atraumatic.   Eyes:      Extraocular Movements: Extraocular movements intact.      Conjunctiva/sclera: Conjunctivae normal.   Cardiovascular:      Rate and Rhythm: Normal rate and regular rhythm.   Pulmonary:      Effort: Pulmonary effort is normal. No respiratory distress.      Breath sounds: Normal breath sounds.   Abdominal:      General: Bowel sounds are normal.      Palpations: Abdomen is soft.      Tenderness: There is no abdominal tenderness.   Musculoskeletal:          General: No swelling or tenderness.      Cervical back: Normal range of motion and neck supple.   Skin:     General: Skin is warm and dry.      Coloration: Skin is not jaundiced.   Neurological:      General: No focal deficit present.      Mental Status: She is alert and oriented to person, place, and time. Mental status is at baseline.   Psychiatric:         Mood and Affect: Mood normal.         Behavior: Behavior normal.         Thought Content: Thought content normal.          Lab Results:   Lab Results   Component Value Date    WBC 7.48 04/22/2024    HGB 10.5 (L) 04/22/2024    HCT 33.6 (L) 04/22/2024    MCV 96 04/22/2024     04/22/2024       Lab Results   Component Value Date    SODIUM 137 04/09/2024    K 3.8 04/09/2024     04/09/2024    CO2 26 04/09/2024    AGAP 5 04/09/2024    BUN 10 04/09/2024    CREATININE 0.79 04/09/2024    GLUC 98 04/09/2024    GLUF 129 (H) 04/04/2024    CALCIUM 8.1 (L) 04/09/2024    AST 19 04/07/2024    ALT 24 04/07/2024    ALKPHOS 30 (L) 04/07/2024    TP 5.7 (L) 04/07/2024    TBILI 0.46 04/07/2024    EGFR 78 04/09/2024     Lab Results   Component Value Date    IRON 68 04/07/2024    TIBC 307 04/07/2024    FERRITIN 59 04/07/2024       Radiology Results:   EGD    Result Date: 4/7/2024  Narrative: Table formatting from the original result was not included. [facility] Operating Room [address] [facility] [address] [phone] DATE OF SERVICE: 4/07/24 PHYSICIAN(S): Attending: Jessa Ragsdale MD Fellow: No Staff Documented INDICATION: Upper GI bleed, Coffee ground emesis POST-OP DIAGNOSIS: See the impression below. PREPROCEDURE: Informed consent was obtained for the procedure, including sedation.  Risks of perforation, hemorrhage, adverse drug reaction and aspiration were discussed. The patient was placed in the left lateral decubitus position. Patient was explained about the risks and benefits of the procedure. Risks including but not limited to  bleeding, infection, and perforation were explained in detail. Also explained about less than 100% sensitivity with the exam and other alternatives. PROCEDURE: EGD DETAILS OF PROCEDURE: Patient was taken to the procedure room where a time out was performed to confirm correct patient and correct procedure. The patient underwent monitored anesthesia care, which was administered by an anesthesia professional. The patient's blood pressure, heart rate, level of consciousness, respirations, oxygen, ECG and ETCO2 were monitored throughout the procedure. The scope was introduced through the mouth and advanced to the second part of the duodenum. Retroflexion was performed in the fundus. The patient experienced no blood loss. The procedure was not difficult. The patient tolerated the procedure well. There were no apparent adverse events. ANESTHESIA INFORMATION: ASA: IV Anesthesia Type: IV Sedation with Anesthesia MEDICATIONS: No administrations occurring from 1217 to 1250 on 04/07/24 FINDINGS: Esophagus-fresher linear deep erosion was noted in the distal esophagus at the GE junction Stomach-fresh horizontal white-based ulcer noted in the antrum under an inflamed fold.  Another small fresh ulceration was noted in the adjacent area.  No evidence of any active bleeding. The duodenum appeared normal. SPECIMENS: * No specimens in log *     Impression: Esophagus-fresher linear deep erosion was noted in the distal esophagus at the GE junction Stomach-fresh horizontal white-based ulcer noted in the antrum under an inflamed fold.  Another small fresh ulceration was noted in the adjacent area.  No evidence of any active bleeding. The duodenum appeared normal. RECOMMENDATION:  Protonix drip, add Carafate, avoid NSAIDs.  Check stool for H. pylori antigen.  Continue Plavix because of recent stent placement.  Since patient is at increased risks requiring anticoagulation therapy we can consider using IV heparin for a couple of days before  putting her back on Eliquis and watch for any recurrent bleeding    Jessa Ragsdale MD     CT high volume bleeding scan abdomen pelvis    Result Date: 4/7/2024  Narrative: CT ABDOMEN AND PELVIS - WITHOUT AND WITH IV CONTRAST INDICATION:   acute GIB. COMPARISON: 4/1/2024 TECHNIQUE: CT examination of the abdomen and pelvis was performed both prior to and after the administration of intravenous contrast. Multiplanar 2D reformatted images were created from the source data. Radiation dose length product (DLP) for this visit: 2908 mGy-cm . This examination, like all CT scans performed in the [facility], was performed utilizing techniques to minimize radiation dose exposure, including the use of iterative reconstruction and automated exposure control. IV Contrast: 100 mL of iohexol (OMNIPAQUE) Enteric Contrast: Not administered. FINDINGS: ABDOMEN BOWEL: Asymmetric focal thickening of the anterior wall of the distal stomach, up to 2 cm. It is approximately 4-5 cm proximal to the pylorus. At the thickened wall, faint blush on image 51 series 303 arterial phase, with distribution of radiodense material (up to 81 HU) in the lumen on delayed images. Precontrast intraluminal density 33 HU. This is highly suspicious for a gastric bleeding due to underlying lesion or PUD. Recommend endoscopic correlation. No other foci suspicious for acute GI bleed. Diverticulosis coli. No evidence of diverticulitis, diverticular bleed, AVM. Normal GI tract caliber. No evidence of obstruction or acute inflammatory changes. LOWER CHEST: No clinically significant abnormality in the visualized lower chest. Pacemaker electrodes in the right cardiac chambers. LIVER/BILIARY TREE: Unremarkable. GALLBLADDER: Cholelithiasis without findings of acute cholecystitis. SPLEEN: Calcified granulomata. No suspicious mass. PANCREAS: Unremarkable. ADRENAL GLANDS: Unremarkable. KIDNEYS/URETERS: Simple renal cyst(s). Otherwise unremarkable kidneys. No  hydronephrosis. APPENDIX: No findings to suggest appendicitis. ABDOMINOPELVIC CAVITY: No ascites. No pneumoperitoneum. No lymphadenopathy. VESSELS: Atherosclerotic PELVIS REPRODUCTIVE ORGANS: Unremarkable for patient's age. URINARY BLADDER: Unremarkable. ABDOMINAL WALL/INGUINAL REGIONS: Unremarkable. BONES: No acute fracture or suspicious osseous lesion. L3-L5 laminectomies and L3-S1 posterior spinal fixation.     Impression: Suspected small hemorrhage from the anterior wall of the distal gastric body with focal wall thickening. Bleeding due to underlying lesion or PUD is suspected. Recommend endoscopic correlation. No other acute findings in the GI tract. Diverticulosis coli. No diverticulitis. Normal appendix. Cholelithiasis in the otherwise unremarkable gallbladder. Other chronic findings, as per the body of the report. I personally discussed this study with ABBIE KLEIN on 4/7/2024 11:54 AM. Dr. Ragsdale and GI team was also notified soon after. Workstation performed: [id number]       Kassi High PA-C   Available on [employer]

## 2024-05-05 NOTE — PROGRESS NOTES
Electrophysiology Hospital Follow Up  [facility] V[facility] Center  [facility] [place] [address]    Name: Vesna Langston  : 1960  MRN: [id number]    ASSESSMENT:  1. Atrial flutter, paroxysmal (HCC)  POCT ECG      2. HFrEF (heart failure with reduced ejection fraction) (HCC)  POCT ECG      3. Ischemic cardiomyopathy  POCT ECG      4. S/P ICD (internal cardiac defibrillator) procedure  POCT ECG      5. History of sustained ventricular tachycardia  POCT ECG          PLAN:  Medtronic dual-chamber ICD in situ  - implanted by Dr. Lama on 3/27/2024 due to scar related VT and ischemic cardiomyopathy  Ventricular tachycardia  - felt to be scar mediated, cMRI done this admission to delineate where scar is  - being loaded with amiodarone  - beta blocker therapy of metoprolol succinate  Atrial flutter with some concern for atrial fibrillation  - anticoagulated with Eliquis / [id number] score of 4 (age, sex, CHF, DM)  - EF of 35% per echo 3/2024 /moderate dilation of left atrium noted  - rate control: metoprolol succinate  - antiarrhythmic therapy: amiodarone, see #2  - prior cardioversion: none  - prior ablation: none  STEMI  - cardiac cath 3/2024 showing single-vessel CAD with ostial total occlusion of LAD status post IVUS guided PCI to ostial/proximal LAD and placement of NATHAN  - had been felt this was due to chronic scar rather than acute thrombus given marked difficulty crossing total occlusion with a wire and resistance to opening with balloon dilation  - now started on aspirin, Plavix, atorvastatin, and metoprolol succinate (triple therapy with Eliquis for only 1 week post cath)  Ischemic cardiomyopathy  - EF of 35% per echo 3/23/2024, repeat echo still showing EF of 35%  - now started on GDMT of metoprolol succinate and Entresto  Newly diagnosed diabetes mellitus  Hyperlipidemia  Tobacco use    IMPRESSION:  1. Arrhythmias - Ms. Langston is doing well from an arrhythmia  standpoint.  Device was interrogated today and shows no evidence of ventricular tachycardia or arrhythmias.  She is currently maintained on amiodarone and is felt that she should likely be on this for at least 6 months post VT and ICD implantation.    However, does appear to be hypothyroid with last blood work 4/22.  Levothyroxine was adjusted by PCP.  Will make sure that we keep a close eye on this while we maintain her on amiodarone.  Will discuss with attending but will likely consider switching antiarrhythmic in the near future if thyroid dysfunction is an issue.    Agree with cardiac rehab and will defer to heart failure which she saw earlier today for adjustments of GDMT.    Patient is to follow up in our EP office in 3 months. She is to call our office with any further questions or concerns in the meantime.    HPI:   Interim history: Vesna Langston is a 64 y.o. female with STEMI status post NATHAN, ischemic cardiomyopathy, VT, and atrial arrhythmias, newly diagnosed diabetes mellitus, hyperlipidemia, and tobacco use. She presents today for hospital follow up.    Patient presented as a STEMI 3/22 transferred from [facility] after she presented with syncopal episode and had PCI to proximal LAD.  She reported that she was getting steroid injection for lower back issues.  At that time patient was also noted to have arrhythmias, was given amiodarone bolus for ventricular tachycardia that was felt to be in the setting of ischemia and also once in the Cath Lab.  On presentation to the Cath Lab was also noted to be in 2:1 atrial flutter that converted during procedure but had received adenosine when at [facility]     She has been doing well from a CAD standpoint. She also had small bursts of atrial flutter vs fib on telemety at the beginning of her stay - did not have them towards the end as she was on amiodarone.    She did undergo cMRI that showed concern for scar and then subsequently ICD implantation.    Since discharge she has had  "some issues with upper GI bleed - likely due to NSAIDs she was taking for back pain but also some component due to triple therapy. She has been watching what she is eating with great improvement in cholesterol. Will be starting cardiac rehab soon (was delayed due to lack of personnel). Did have an episode of anxiety last week that she did call what sounds to be a hotline and was placed on Lexapro short term.     She is hopeful to get back to work at the end of the month, for now currently at her daughters and they are fostering kittens together.     EKG: Normal sinus rhythm at 66 beats per minute    ROS:   Review of Systems   Constitutional: Negative for chills, diaphoresis, fever and malaise/fatigue.   Cardiovascular:  Negative for chest pain, claudication, cyanosis, dyspnea on exertion, irregular heartbeat, leg swelling, near-syncope, orthopnea, palpitations, paroxysmal nocturnal dyspnea and syncope.   Respiratory:  Negative for shortness of breath and sleep disturbances due to breathing.    Neurological:  Negative for dizziness and light-headedness.   All other systems reviewed and are negative.      OBJECTIVE:   Vitals:   /68 (BP Location: Right arm, Patient Position: Sitting, Cuff Size: Large)   Pulse 66   Ht 5' 8\" (1.727 m)   Wt 93.4 kg (206 lb)   BMI 31.32 kg/m²       Physical Exam:   Physical Exam  Vitals and nursing note reviewed.   Constitutional:       General: She is not in acute distress.     Appearance: Normal appearance. She is well-developed. She is not diaphoretic.   HENT:      Head: Normocephalic and atraumatic.   Eyes:      Pupils: Pupils are equal, round, and reactive to light.   Neck:      Vascular: No JVD.   Cardiovascular:      Rate and Rhythm: Normal rate and regular rhythm.      Heart sounds: No murmur heard.     No friction rub. No gallop.   Pulmonary:      Effort: Pulmonary effort is normal. No respiratory distress.      Breath sounds: Normal breath sounds. No wheezing. "   Abdominal:      Palpations: Abdomen is soft.   Musculoskeletal:         General: Normal range of motion.      Cervical back: Normal range of motion.   Skin:     General: Skin is warm and dry.   Neurological:      Mental Status: She is alert and oriented to person, place, and time.   Psychiatric:         Mood and Affect: Mood normal.         Behavior: Behavior normal.       Medications:      Current Outpatient Medications:     acetaminophen (TYLENOL) 650 mg CR tablet, Take 650 mg by mouth every 8 (eight) hours as needed, Disp: , Rfl:     albuterol (ProAir HFA) 90 mcg/act inhaler, Inhale 2 puffs every 6 (six) hours as needed for wheezing, Disp: 8.5 g, Rfl: 0    amiodarone 200 mg tablet, Take 1 tablet (200 mg total) by mouth daily, Disp: , Rfl:     apixaban (ELIQUIS) 5 mg, Take 1 tablet (5 mg total) by mouth 2 (two) times a day, Disp: 60 tablet, Rfl: 2    atorvastatin (LIPITOR) 80 mg tablet, Take 1 tablet (80 mg total) by mouth every evening, Disp: 30 tablet, Rfl: 2    clopidogrel (PLAVIX) 75 mg tablet, Take 1 tablet (75 mg total) by mouth daily, Disp: 30 tablet, Rfl: 2    escitalopram (LEXAPRO) 10 mg tablet, Take 1 tablet (10 mg total) by mouth daily, Disp: 30 tablet, Rfl: 5    furosemide (LASIX) 20 mg tablet, Take 1 tablet (20 mg total) by mouth as needed (for rapid weight gain (3+ lbs overnight or 5+ lbs in 5-7 days) or worsening shortness of breath), Disp: 30 tablet, Rfl: 1    gabapentin (Neurontin) 100 mg capsule, Take 1 capsule (100 mg total) by mouth 2 (two) times a day, Disp: , Rfl:     levothyroxine (Synthroid) 50 mcg tablet, Take 1 tablet (50 mcg total) by mouth daily, Disp: 30 tablet, Rfl: 5    meclizine (ANTIVERT) 25 mg tablet, Take 1 tablet (25 mg total) by mouth every 8 (eight) hours as needed for dizziness, Disp: 30 tablet, Rfl: 0    metoprolol succinate (TOPROL-XL) 25 mg 24 hr tablet, Take 1 tablet (25 mg total) by mouth daily at bedtime, Disp: 90 tablet, Rfl: 5    Multiple Vitamin (MULTIVITAMIN)  capsule, Take 1 capsule by mouth daily, Disp: , Rfl:     pantoprazole (PROTONIX) 40 mg tablet, Take 1 tablet (40 mg total) by mouth 2 (two) times a day before meals, Disp: 60 tablet, Rfl: 3    sucralfate (CARAFATE) 1 g tablet, Take 1 tablet (1 g total) by mouth 4 (four) times a day (before meals and at bedtime), Disp: 120 tablet, Rfl: 3    Empagliflozin (Jardiance) 25 MG TABS, Take 1 tablet (25 mg total) by mouth every morning (Patient not taking: Reported on 2024), Disp: 90 tablet, Rfl: 5    sacubitril-valsartan (Entresto) 24-26 MG TABS, Take 1 tablet by mouth 2 (two) times a day (Patient not taking: Reported on 2024), Disp: 180 tablet, Rfl: 5     Family History   Problem Relation Age of Onset    Depression Mother     Heart disease Father     OCD Daughter      Social History     Socioeconomic History    Marital status:      Spouse name: Not on file    Number of children: 3    Years of education: Not on file    Highest education level: Not on file   Occupational History    Not on file   Tobacco Use    Smoking status: Former     Current packs/day: 0.00     Average packs/day: 1 pack/day for 24.2 years (24.2 ttl pk-yrs)     Types: Cigarettes     Start date: 2000     Quit date: 3/22/2024     Years since quittin.1    Smokeless tobacco: Never    Tobacco comments:     Smoked 0.5-1 ppd; quit in 2024.    Vaping Use    Vaping status: Never Used   Substance and Sexual Activity    Alcohol use: Not Currently     Alcohol/week: 1.0 standard drink of alcohol     Types: 1 Shots of liquor per week     Comment: rarely    Drug use: Never    Sexual activity: Not Currently     Partners: Female     Birth control/protection: Post-menopausal   Other Topics Concern    Not on file   Social History Narrative    Not on file     Social Determinants of Health     Financial Resource Strain: Low Risk  (2023)    Received from [facility]    Overall Financial Resource Strain (CARDIA)     Difficulty of  Paying Living Expenses: Not hard at all   Food Insecurity: No Food Insecurity (2024)    Hunger Vital Sign     Worried About Running Out of Food in the Last Year: Never true     Ran Out of Food in the Last Year: Never true   Transportation Needs: No Transportation Needs (2024)    PRAPARE - Transportation     Lack of Transportation (Medical): No     Lack of Transportation (Non-Medical): No   Physical Activity: Not on file   Stress: No Stress Concern Present (2023)    Received from [facility]    [place] [employer] [place] [employer] - Occupational Stress Questionnaire     Feeling of Stress : Not at all   Social Connections: Moderately Isolated (2023)    Received from [facility]    Social Connection and Isolation Panel [NHANES]     Frequency of Communication with Friends and Family: More than three times a week     Frequency of Social Gatherings with Friends and Family: Once a week     Attends Yazdanism Services: More than 4 times per year     Active Member of Clubs or Organizations: No     Attends Club or Organization Meetings: Never     Marital Status:    Intimate Partner Violence: Not At Risk (2023)    Received from [facility]    Humiliation, Afraid, Rape, and Kick questionnaire     Fear of Current or Ex-Partner: No     Emotionally Abused: No     Physically Abused: No     Sexually Abused: No   Housing Stability: Low Risk  (2024)    Housing Stability Vital Sign     Unable to Pay for Housing in the Last Year: No     Number of Places Lived in the Last Year: 2     Unstable Housing in the Last Year: No     Social History     Tobacco Use   Smoking Status Former    Current packs/day: 0.00    Average packs/day: 1 pack/day for 24.2 years (24.2 ttl pk-yrs)    Types: Cigarettes    Start date: 2000    Quit date: 3/22/2024    Years since quittin.1   Smokeless Tobacco Never   Tobacco Comments    Smoked 0.5-1 ppd; quit in 2024.       Social History     Substance and Sexual Activity   Alcohol Use Not Currently    Alcohol/week: 1.0 standard drink of alcohol    Types: 1 Shots of liquor per week    Comment: rarely       Labs & Results:  Below is the patient's most recent value for Albumin, ALT, AST, BUN, Calcium, Chloride, Cholesterol, CO2, Creatinine, GFR, Glucose, HDL, Hematocrit, Hemoglobin, Hemoglobin A1C, LDL, Magnesium, Phosphorus, Platelets, Potassium, PSA, Sodium, Triglycerides, and WBC.   Lab Results   Component Value Date    ALT 24 04/07/2024    AST 19 04/07/2024    BUN 10 04/09/2024    CALCIUM 8.1 (L) 04/09/2024     04/09/2024    CO2 26 04/09/2024    CREATININE 0.79 04/09/2024    HDL 45 (L) 04/22/2024    HCT 33.6 (L) 04/22/2024    HGB 10.5 (L) 04/22/2024    HGBA1C 7.9 (H) 03/22/2024    MG 2.1 04/01/2024    PHOS 4.5 (H) 03/26/2024     04/22/2024    K 3.8 04/09/2024    TRIG 137 04/22/2024    WBC 7.48 04/22/2024     Note: for a comprehensive list of the patient's lab results, access the Results Review activity.    CARDIAC TESTING:   ECHO:   No results found for this or any previous visit.    No results found for this or any previous visit.      CATH:  No results found for this or any previous visit.      STRESS TEST:  No results found for this or any previous visit.

## 2024-05-06 ENCOUNTER — OFFICE VISIT (OUTPATIENT)
Dept: CARDIOLOGY CLINIC | Facility: CLINIC | Age: 64
End: 2024-05-06
Payer: COMMERCIAL

## 2024-05-06 VITALS
HEART RATE: 66 BPM | WEIGHT: 206 LBS | DIASTOLIC BLOOD PRESSURE: 68 MMHG | HEIGHT: 68 IN | SYSTOLIC BLOOD PRESSURE: 112 MMHG | BODY MASS INDEX: 31.22 KG/M2

## 2024-05-06 VITALS
HEIGHT: 68 IN | WEIGHT: 206.4 LBS | SYSTOLIC BLOOD PRESSURE: 122 MMHG | OXYGEN SATURATION: 98 % | BODY MASS INDEX: 31.28 KG/M2 | HEART RATE: 71 BPM | DIASTOLIC BLOOD PRESSURE: 64 MMHG

## 2024-05-06 DIAGNOSIS — Z95.810 AICD (AUTOMATIC CARDIOVERTER/DEFIBRILLATOR) PRESENT: ICD-10-CM

## 2024-05-06 DIAGNOSIS — Z95.810 S/P ICD (INTERNAL CARDIAC DEFIBRILLATOR) PROCEDURE: Chronic | ICD-10-CM

## 2024-05-06 DIAGNOSIS — I50.22 CHRONIC HFREF (HEART FAILURE WITH REDUCED EJECTION FRACTION) (HCC): Primary | ICD-10-CM

## 2024-05-06 DIAGNOSIS — I25.5 ISCHEMIC CARDIOMYOPATHY: Chronic | ICD-10-CM

## 2024-05-06 DIAGNOSIS — I50.20 HFREF (HEART FAILURE WITH REDUCED EJECTION FRACTION) (HCC): ICD-10-CM

## 2024-05-06 DIAGNOSIS — R55 SYNCOPE AND COLLAPSE: ICD-10-CM

## 2024-05-06 DIAGNOSIS — Z86.79 HISTORY OF SUSTAINED VENTRICULAR TACHYCARDIA: ICD-10-CM

## 2024-05-06 DIAGNOSIS — I48.92 ATRIAL FLUTTER, PAROXYSMAL (HCC): Primary | Chronic | ICD-10-CM

## 2024-05-06 DIAGNOSIS — I21.02 ST ELEVATION MYOCARDIAL INFARCTION INVOLVING LEFT ANTERIOR DESCENDING (LAD) CORONARY ARTERY (HCC): ICD-10-CM

## 2024-05-06 DIAGNOSIS — Z86.79 HISTORY OF SUSTAINED VENTRICULAR TACHYCARDIA: Chronic | ICD-10-CM

## 2024-05-06 PROCEDURE — 99024 POSTOP FOLLOW-UP VISIT: CPT | Performed by: STUDENT IN AN ORGANIZED HEALTH CARE EDUCATION/TRAINING PROGRAM

## 2024-05-06 PROCEDURE — 99214 OFFICE O/P EST MOD 30 MIN: CPT | Performed by: PHYSICIAN ASSISTANT

## 2024-05-06 PROCEDURE — 93000 ELECTROCARDIOGRAM COMPLETE: CPT | Performed by: PHYSICIAN ASSISTANT

## 2024-05-06 RX ORDER — SACUBITRIL AND VALSARTAN 24; 26 MG/1; MG/1
1 TABLET, FILM COATED ORAL 2 TIMES DAILY
Qty: 180 TABLET | Refills: 5 | Status: SHIPPED | OUTPATIENT
Start: 2024-05-06 | End: 2025-10-28

## 2024-05-06 RX ORDER — METOPROLOL SUCCINATE 25 MG/1
25 TABLET, EXTENDED RELEASE ORAL
Qty: 90 TABLET | Refills: 5 | Status: SHIPPED | OUTPATIENT
Start: 2024-05-06 | End: 2025-10-28

## 2024-05-06 NOTE — PATIENT INSTRUCTIONS
Increase toprol 12.5 daily to 25 daily.  Wait 3 days.  Start entresto.  Wait 4 days.  Then get bloodwork checked.  Wait for phone call regarding bloodwork results and we may start jardiance next.

## 2024-05-07 ENCOUNTER — APPOINTMENT (EMERGENCY)
Dept: CT IMAGING | Facility: HOSPITAL | Age: 64
DRG: 341 | End: 2024-05-07
Payer: COMMERCIAL

## 2024-05-07 ENCOUNTER — APPOINTMENT (INPATIENT)
Dept: VASCULAR ULTRASOUND | Facility: HOSPITAL | Age: 64
DRG: 341 | End: 2024-05-07
Payer: COMMERCIAL

## 2024-05-07 ENCOUNTER — HOSPITAL ENCOUNTER (INPATIENT)
Facility: HOSPITAL | Age: 64
LOS: 5 days | Discharge: HOME | DRG: 341 | End: 2024-05-12
Attending: EMERGENCY MEDICINE | Admitting: SURGERY
Payer: COMMERCIAL

## 2024-05-07 ENCOUNTER — ANESTHESIA EVENT (INPATIENT)
Dept: PERIOP | Facility: HOSPITAL | Age: 64
DRG: 341 | End: 2024-05-07
Payer: COMMERCIAL

## 2024-05-07 ENCOUNTER — APPOINTMENT (INPATIENT)
Dept: NON INVASIVE DIAGNOSTICS | Facility: HOSPITAL | Age: 64
DRG: 341 | End: 2024-05-07
Payer: COMMERCIAL

## 2024-05-07 DIAGNOSIS — K35.30 ACUTE APPENDICITIS WITH LOCALIZED PERITONITIS, WITHOUT PERFORATION, ABSCESS, OR GANGRENE: Primary | ICD-10-CM

## 2024-05-07 DIAGNOSIS — K35.80 ACUTE APPENDICITIS, UNSPECIFIED ACUTE APPENDICITIS TYPE: ICD-10-CM

## 2024-05-07 DIAGNOSIS — I26.99 ACUTE PULMONARY EMBOLISM, UNSPECIFIED PULMONARY EMBOLISM TYPE, UNSPECIFIED WHETHER ACUTE COR PULMONALE PRESENT (HCC): ICD-10-CM

## 2024-05-07 DIAGNOSIS — I26.99 PE (PULMONARY THROMBOEMBOLISM) (HCC): ICD-10-CM

## 2024-05-07 DIAGNOSIS — I48.92 ATRIAL FLUTTER, PAROXYSMAL (HCC): ICD-10-CM

## 2024-05-07 DIAGNOSIS — K35.31 ACUTE APPENDICITIS WITH LOCALIZED PERITONITIS AND GANGRENE, WITHOUT PERFORATION OR ABSCESS: ICD-10-CM

## 2024-05-07 PROBLEM — Z87.11 HISTORY OF GASTRIC ULCER: Status: ACTIVE | Noted: 2024-05-07

## 2024-05-07 LAB
2HR DELTA HS TROPONIN: -2 NG/L
ALBUMIN SERPL BCP-MCNC: 4.3 G/DL (ref 3.5–5)
ALP SERPL-CCNC: 44 U/L (ref 34–104)
ALT SERPL W P-5'-P-CCNC: 23 U/L (ref 7–52)
ANION GAP SERPL CALCULATED.3IONS-SCNC: 11 MMOL/L (ref 4–13)
APICAL FOUR CHAMBER EJECTION FRACTION: 46 %
APTT PPP: 141 SECONDS (ref 23–37)
APTT PPP: 27 SECONDS (ref 23–37)
AST SERPL W P-5'-P-CCNC: 23 U/L (ref 13–39)
BACTERIA UR QL AUTO: NORMAL /HPF
BASOPHILS # BLD AUTO: 0.05 THOUSANDS/ÂΜL (ref 0–0.1)
BASOPHILS NFR BLD AUTO: 0 % (ref 0–1)
BILIRUB SERPL-MCNC: 1.01 MG/DL (ref 0.2–1)
BILIRUB UR QL STRIP: NEGATIVE
BSA FOR ECHO PROCEDURE: 2.07 M2
BUN SERPL-MCNC: 13 MG/DL (ref 5–25)
CALCIUM SERPL-MCNC: 9.5 MG/DL (ref 8.4–10.2)
CARDIAC TROPONIN I PNL SERPL HS: 6 NG/L
CARDIAC TROPONIN I PNL SERPL HS: 8 NG/L
CHLORIDE SERPL-SCNC: 103 MMOL/L (ref 96–108)
CLARITY UR: CLEAR
CO2 SERPL-SCNC: 24 MMOL/L (ref 21–32)
COLOR UR: ABNORMAL
CREAT SERPL-MCNC: 0.79 MG/DL (ref 0.6–1.3)
EOSINOPHIL # BLD AUTO: 0.27 THOUSAND/ÂΜL (ref 0–0.61)
EOSINOPHIL NFR BLD AUTO: 2 % (ref 0–6)
ERYTHROCYTE [DISTWIDTH] IN BLOOD BY AUTOMATED COUNT: 14.3 % (ref 11.6–15.1)
GFR SERPL CREATININE-BSD FRML MDRD: 78 ML/MIN/1.73SQ M
GLUCOSE SERPL-MCNC: 151 MG/DL (ref 65–140)
GLUCOSE SERPL-MCNC: 174 MG/DL (ref 65–140)
GLUCOSE SERPL-MCNC: 235 MG/DL (ref 65–140)
GLUCOSE UR STRIP-MCNC: NEGATIVE MG/DL
HCT VFR BLD AUTO: 36.7 % (ref 34.8–46.1)
HGB BLD-MCNC: 11.7 G/DL (ref 11.5–15.4)
HGB UR QL STRIP.AUTO: NEGATIVE
IMM GRANULOCYTES # BLD AUTO: 0.06 THOUSAND/UL (ref 0–0.2)
IMM GRANULOCYTES NFR BLD AUTO: 0 % (ref 0–2)
INR PPP: 1.08 (ref 0.84–1.19)
KETONES UR STRIP-MCNC: NEGATIVE MG/DL
LEUKOCYTE ESTERASE UR QL STRIP: NEGATIVE
LIPASE SERPL-CCNC: 16 U/L (ref 11–82)
LYMPHOCYTES # BLD AUTO: 1.57 THOUSANDS/ÂΜL (ref 0.6–4.47)
LYMPHOCYTES NFR BLD AUTO: 9 % (ref 14–44)
MCH RBC QN AUTO: 29.3 PG (ref 26.8–34.3)
MCHC RBC AUTO-ENTMCNC: 31.9 G/DL (ref 31.4–37.4)
MCV RBC AUTO: 92 FL (ref 82–98)
MONOCYTES # BLD AUTO: 1.22 THOUSAND/ÂΜL (ref 0.17–1.22)
MONOCYTES NFR BLD AUTO: 7 % (ref 4–12)
MV E'TISSUE VEL-LAT: 11 CM/S
NEUTROPHILS # BLD AUTO: 13.65 THOUSANDS/ÂΜL (ref 1.85–7.62)
NEUTS SEG NFR BLD AUTO: 82 % (ref 43–75)
NITRITE UR QL STRIP: NEGATIVE
NON-SQ EPI CELLS URNS QL MICRO: NORMAL /HPF
NRBC BLD AUTO-RTO: 0 /100 WBCS
PH UR STRIP.AUTO: 7.5 [PH]
PLATELET # BLD AUTO: 305 THOUSANDS/UL (ref 149–390)
PMV BLD AUTO: 10.1 FL (ref 8.9–12.7)
POTASSIUM SERPL-SCNC: 3.9 MMOL/L (ref 3.5–5.3)
PROT SERPL-MCNC: 7.2 G/DL (ref 6.4–8.4)
PROT UR STRIP-MCNC: ABNORMAL MG/DL
PROTHROMBIN TIME: 14.6 SECONDS (ref 11.6–14.5)
RA PRESSURE ESTIMATED: 8 MMHG
RBC # BLD AUTO: 3.99 MILLION/UL (ref 3.81–5.12)
RBC #/AREA URNS AUTO: NORMAL /HPF
RV PSP: 25 MMHG
SL CV LV EF: 30
SODIUM SERPL-SCNC: 138 MMOL/L (ref 135–147)
SP GR UR STRIP.AUTO: >=1.05 (ref 1–1.03)
TR MAX PG: 17 MMHG
TR PEAK VELOCITY: 2.1 M/S
TRICUSPID ANNULAR PLANE SYSTOLIC EXCURSION: 2.3 CM
TRICUSPID VALVE PEAK REGURGITATION VELOCITY: 2.09 M/S
UROBILINOGEN UR STRIP-ACNC: <2 MG/DL
WBC # BLD AUTO: 16.82 THOUSAND/UL (ref 4.31–10.16)
WBC #/AREA URNS AUTO: NORMAL /HPF

## 2024-05-07 PROCEDURE — 99222 1ST HOSP IP/OBS MODERATE 55: CPT | Performed by: INTERNAL MEDICINE

## 2024-05-07 PROCEDURE — 81001 URINALYSIS AUTO W/SCOPE: CPT | Performed by: EMERGENCY MEDICINE

## 2024-05-07 PROCEDURE — 99223 1ST HOSP IP/OBS HIGH 75: CPT | Performed by: SURGERY

## 2024-05-07 PROCEDURE — 74177 CT ABD & PELVIS W/CONTRAST: CPT

## 2024-05-07 PROCEDURE — 85730 THROMBOPLASTIN TIME PARTIAL: CPT | Performed by: SURGERY

## 2024-05-07 PROCEDURE — 85730 THROMBOPLASTIN TIME PARTIAL: CPT | Performed by: NURSE PRACTITIONER

## 2024-05-07 PROCEDURE — 80053 COMPREHEN METABOLIC PANEL: CPT | Performed by: EMERGENCY MEDICINE

## 2024-05-07 PROCEDURE — 36415 COLL VENOUS BLD VENIPUNCTURE: CPT

## 2024-05-07 PROCEDURE — 93308 TTE F-UP OR LMTD: CPT | Performed by: INTERNAL MEDICINE

## 2024-05-07 PROCEDURE — 96368 THER/DIAG CONCURRENT INF: CPT

## 2024-05-07 PROCEDURE — 93970 EXTREMITY STUDY: CPT

## 2024-05-07 PROCEDURE — C9113 INJ PANTOPRAZOLE SODIUM, VIA: HCPCS | Performed by: STUDENT IN AN ORGANIZED HEALTH CARE EDUCATION/TRAINING PROGRAM

## 2024-05-07 PROCEDURE — NC001 PR NO CHARGE: Performed by: SURGERY

## 2024-05-07 PROCEDURE — 99285 EMERGENCY DEPT VISIT HI MDM: CPT | Performed by: EMERGENCY MEDICINE

## 2024-05-07 PROCEDURE — 96365 THER/PROPH/DIAG IV INF INIT: CPT

## 2024-05-07 PROCEDURE — 93970 EXTREMITY STUDY: CPT | Performed by: SURGERY

## 2024-05-07 PROCEDURE — 99285 EMERGENCY DEPT VISIT HI MDM: CPT

## 2024-05-07 PROCEDURE — 99255 IP/OBS CONSLTJ NEW/EST HI 80: CPT | Performed by: STUDENT IN AN ORGANIZED HEALTH CARE EDUCATION/TRAINING PROGRAM

## 2024-05-07 PROCEDURE — 93325 DOPPLER ECHO COLOR FLOW MAPG: CPT

## 2024-05-07 PROCEDURE — 93321 DOPPLER ECHO F-UP/LMTD STD: CPT | Performed by: INTERNAL MEDICINE

## 2024-05-07 PROCEDURE — 93321 DOPPLER ECHO F-UP/LMTD STD: CPT

## 2024-05-07 PROCEDURE — 71275 CT ANGIOGRAPHY CHEST: CPT

## 2024-05-07 PROCEDURE — 96375 TX/PRO/DX INJ NEW DRUG ADDON: CPT

## 2024-05-07 PROCEDURE — 93308 TTE F-UP OR LMTD: CPT

## 2024-05-07 PROCEDURE — 85610 PROTHROMBIN TIME: CPT | Performed by: NURSE PRACTITIONER

## 2024-05-07 PROCEDURE — 83690 ASSAY OF LIPASE: CPT | Performed by: EMERGENCY MEDICINE

## 2024-05-07 PROCEDURE — 93005 ELECTROCARDIOGRAM TRACING: CPT

## 2024-05-07 PROCEDURE — 94664 DEMO&/EVAL PT USE INHALER: CPT

## 2024-05-07 PROCEDURE — 93325 DOPPLER ECHO COLOR FLOW MAPG: CPT | Performed by: INTERNAL MEDICINE

## 2024-05-07 PROCEDURE — 82948 REAGENT STRIP/BLOOD GLUCOSE: CPT

## 2024-05-07 PROCEDURE — 84484 ASSAY OF TROPONIN QUANT: CPT | Performed by: EMERGENCY MEDICINE

## 2024-05-07 PROCEDURE — 85025 COMPLETE CBC W/AUTO DIFF WBC: CPT | Performed by: EMERGENCY MEDICINE

## 2024-05-07 RX ORDER — METRONIDAZOLE 500 MG/100ML
500 INJECTION, SOLUTION INTRAVENOUS EVERY 8 HOURS
Status: DISCONTINUED | OUTPATIENT
Start: 2024-05-07 | End: 2024-05-07

## 2024-05-07 RX ORDER — HYDROMORPHONE HCL/PF 1 MG/ML
0.5 SYRINGE (ML) INJECTION
Status: DISCONTINUED | OUTPATIENT
Start: 2024-05-07 | End: 2024-05-12 | Stop reason: HOSPADM

## 2024-05-07 RX ORDER — FENTANYL CITRATE 50 UG/ML
50 INJECTION, SOLUTION INTRAMUSCULAR; INTRAVENOUS ONCE
Status: COMPLETED | OUTPATIENT
Start: 2024-05-07 | End: 2024-05-07

## 2024-05-07 RX ORDER — ONDANSETRON 2 MG/ML
4 INJECTION INTRAMUSCULAR; INTRAVENOUS EVERY 4 HOURS PRN
Status: DISCONTINUED | OUTPATIENT
Start: 2024-05-07 | End: 2024-05-07

## 2024-05-07 RX ORDER — HEPARIN SODIUM 1000 [USP'U]/ML
3600 INJECTION, SOLUTION INTRAVENOUS; SUBCUTANEOUS EVERY 6 HOURS PRN
Status: DISCONTINUED | OUTPATIENT
Start: 2024-05-07 | End: 2024-05-08

## 2024-05-07 RX ORDER — ACETAMINOPHEN 10 MG/ML
1000 INJECTION, SOLUTION INTRAVENOUS ONCE
Status: COMPLETED | OUTPATIENT
Start: 2024-05-07 | End: 2024-05-07

## 2024-05-07 RX ORDER — ACETAMINOPHEN 325 MG/1
650 TABLET ORAL EVERY 6 HOURS PRN
Status: DISCONTINUED | OUTPATIENT
Start: 2024-05-07 | End: 2024-05-12 | Stop reason: HOSPADM

## 2024-05-07 RX ORDER — HEPARIN SODIUM 1000 [USP'U]/ML
7200 INJECTION, SOLUTION INTRAVENOUS; SUBCUTANEOUS ONCE
Status: COMPLETED | OUTPATIENT
Start: 2024-05-07 | End: 2024-05-07

## 2024-05-07 RX ORDER — ONDANSETRON 2 MG/ML
4 INJECTION INTRAMUSCULAR; INTRAVENOUS EVERY 6 HOURS PRN
Status: DISCONTINUED | OUTPATIENT
Start: 2024-05-07 | End: 2024-05-07

## 2024-05-07 RX ORDER — INSULIN LISPRO 100 [IU]/ML
1-6 INJECTION, SOLUTION INTRAVENOUS; SUBCUTANEOUS
Status: DISCONTINUED | OUTPATIENT
Start: 2024-05-07 | End: 2024-05-12 | Stop reason: HOSPADM

## 2024-05-07 RX ORDER — OXYCODONE HYDROCHLORIDE 5 MG/1
5 TABLET ORAL EVERY 4 HOURS PRN
Status: DISCONTINUED | OUTPATIENT
Start: 2024-05-07 | End: 2024-05-12 | Stop reason: HOSPADM

## 2024-05-07 RX ORDER — HEPARIN SODIUM 1000 [USP'U]/ML
7200 INJECTION, SOLUTION INTRAVENOUS; SUBCUTANEOUS EVERY 6 HOURS PRN
Status: DISCONTINUED | OUTPATIENT
Start: 2024-05-07 | End: 2024-05-08

## 2024-05-07 RX ORDER — ONDANSETRON 2 MG/ML
4 INJECTION INTRAMUSCULAR; INTRAVENOUS ONCE
Status: COMPLETED | OUTPATIENT
Start: 2024-05-07 | End: 2024-05-07

## 2024-05-07 RX ORDER — OXYCODONE HYDROCHLORIDE 10 MG/1
10 TABLET ORAL EVERY 4 HOURS PRN
Status: DISCONTINUED | OUTPATIENT
Start: 2024-05-07 | End: 2024-05-12 | Stop reason: HOSPADM

## 2024-05-07 RX ORDER — CLOPIDOGREL BISULFATE 75 MG/1
75 TABLET ORAL DAILY
Status: DISCONTINUED | OUTPATIENT
Start: 2024-05-07 | End: 2024-05-08

## 2024-05-07 RX ORDER — HEPARIN SODIUM 5000 [USP'U]/ML
5000 INJECTION, SOLUTION INTRAVENOUS; SUBCUTANEOUS EVERY 8 HOURS SCHEDULED
Status: DISCONTINUED | OUTPATIENT
Start: 2024-05-07 | End: 2024-05-07

## 2024-05-07 RX ORDER — SODIUM CHLORIDE, SODIUM LACTATE, POTASSIUM CHLORIDE, CALCIUM CHLORIDE 600; 310; 30; 20 MG/100ML; MG/100ML; MG/100ML; MG/100ML
125 INJECTION, SOLUTION INTRAVENOUS CONTINUOUS
Status: DISCONTINUED | OUTPATIENT
Start: 2024-05-07 | End: 2024-05-07

## 2024-05-07 RX ORDER — DEXTROSE MONOHYDRATE, SODIUM CHLORIDE, AND POTASSIUM CHLORIDE 50; 1.49; 4.5 G/1000ML; G/1000ML; G/1000ML
50 INJECTION, SOLUTION INTRAVENOUS CONTINUOUS
Status: DISCONTINUED | OUTPATIENT
Start: 2024-05-08 | End: 2024-05-08

## 2024-05-07 RX ORDER — ATORVASTATIN CALCIUM 40 MG/1
80 TABLET, FILM COATED ORAL EVERY EVENING
Status: DISCONTINUED | OUTPATIENT
Start: 2024-05-07 | End: 2024-05-12 | Stop reason: HOSPADM

## 2024-05-07 RX ORDER — AMIODARONE HYDROCHLORIDE 200 MG/1
200 TABLET ORAL
Status: DISCONTINUED | OUTPATIENT
Start: 2024-05-07 | End: 2024-05-12 | Stop reason: HOSPADM

## 2024-05-07 RX ORDER — HEPARIN SODIUM 10000 [USP'U]/100ML
3-30 INJECTION, SOLUTION INTRAVENOUS
Status: DISCONTINUED | OUTPATIENT
Start: 2024-05-07 | End: 2024-05-08

## 2024-05-07 RX ORDER — IPRATROPIUM BROMIDE AND ALBUTEROL SULFATE 2.5; .5 MG/3ML; MG/3ML
3 SOLUTION RESPIRATORY (INHALATION)
Status: DISCONTINUED | OUTPATIENT
Start: 2024-05-07 | End: 2024-05-07

## 2024-05-07 RX ORDER — ALBUTEROL SULFATE 90 UG/1
2 AEROSOL, METERED RESPIRATORY (INHALATION) EVERY 6 HOURS PRN
Status: DISCONTINUED | OUTPATIENT
Start: 2024-05-07 | End: 2024-05-12 | Stop reason: HOSPADM

## 2024-05-07 RX ORDER — PANTOPRAZOLE SODIUM 40 MG/10ML
40 INJECTION, POWDER, LYOPHILIZED, FOR SOLUTION INTRAVENOUS
Status: DISCONTINUED | OUTPATIENT
Start: 2024-05-07 | End: 2024-05-09

## 2024-05-07 RX ADMIN — METRONIDAZOLE 500 MG: 500 INJECTION, SOLUTION INTRAVENOUS at 08:54

## 2024-05-07 RX ADMIN — SODIUM CHLORIDE 1000 ML: 0.9 INJECTION, SOLUTION INTRAVENOUS at 06:50

## 2024-05-07 RX ADMIN — PIPERACILLIN SODIUM AND TAZOBACTAM SODIUM 4.5 G: 36; 4.5 INJECTION, POWDER, LYOPHILIZED, FOR SOLUTION INTRAVENOUS at 16:05

## 2024-05-07 RX ADMIN — FENTANYL CITRATE 50 MCG: 50 INJECTION INTRAMUSCULAR; INTRAVENOUS at 10:37

## 2024-05-07 RX ADMIN — IOHEXOL 100 ML: 350 INJECTION, SOLUTION INTRAVENOUS at 08:02

## 2024-05-07 RX ADMIN — SODIUM CHLORIDE, SODIUM LACTATE, POTASSIUM CHLORIDE, AND CALCIUM CHLORIDE 125 ML/HR: .6; .31; .03; .02 INJECTION, SOLUTION INTRAVENOUS at 10:43

## 2024-05-07 RX ADMIN — ONDANSETRON 4 MG: 2 INJECTION INTRAMUSCULAR; INTRAVENOUS at 06:47

## 2024-05-07 RX ADMIN — PIPERACILLIN SODIUM AND TAZOBACTAM SODIUM 4.5 G: 36; 4.5 INJECTION, POWDER, LYOPHILIZED, FOR SOLUTION INTRAVENOUS at 10:04

## 2024-05-07 RX ADMIN — PERFLUTREN 0.6 ML/MIN: 6.52 INJECTION, SUSPENSION INTRAVENOUS at 14:44

## 2024-05-07 RX ADMIN — Medication 12.5 MG: at 20:58

## 2024-05-07 RX ADMIN — AMIODARONE HYDROCHLORIDE 200 MG: 200 TABLET ORAL at 16:03

## 2024-05-07 RX ADMIN — PANTOPRAZOLE SODIUM 40 MG: 40 INJECTION, POWDER, FOR SOLUTION INTRAVENOUS at 10:37

## 2024-05-07 RX ADMIN — ATORVASTATIN CALCIUM 80 MG: 40 TABLET, FILM COATED ORAL at 18:52

## 2024-05-07 RX ADMIN — DEXTROSE, SODIUM CHLORIDE, AND POTASSIUM CHLORIDE 50 ML/HR: 5; .45; .15 INJECTION INTRAVENOUS at 23:36

## 2024-05-07 RX ADMIN — HEPARIN SODIUM 7200 UNITS: 1000 INJECTION INTRAVENOUS; SUBCUTANEOUS at 14:18

## 2024-05-07 RX ADMIN — ONDANSETRON 4 MG: 2 INJECTION INTRAMUSCULAR; INTRAVENOUS at 11:35

## 2024-05-07 RX ADMIN — CEFTRIAXONE SODIUM 1000 MG: 10 INJECTION, POWDER, FOR SOLUTION INTRAVENOUS at 08:39

## 2024-05-07 RX ADMIN — FENTANYL CITRATE 50 MCG: 50 INJECTION INTRAMUSCULAR; INTRAVENOUS at 09:53

## 2024-05-07 RX ADMIN — SODIUM CHLORIDE, SODIUM LACTATE, POTASSIUM CHLORIDE, AND CALCIUM CHLORIDE 125 ML/HR: .6; .31; .03; .02 INJECTION, SOLUTION INTRAVENOUS at 20:55

## 2024-05-07 RX ADMIN — HEPARIN SODIUM 18 UNITS/KG/HR: 10000 INJECTION, SOLUTION INTRAVENOUS at 20:54

## 2024-05-07 RX ADMIN — CLOPIDOGREL BISULFATE 75 MG: 75 TABLET ORAL at 16:03

## 2024-05-07 RX ADMIN — HEPARIN SODIUM 18 UNITS/KG/HR: 10000 INJECTION, SOLUTION INTRAVENOUS at 14:21

## 2024-05-07 RX ADMIN — ACETAMINOPHEN 1000 MG: 10 INJECTION INTRAVENOUS at 07:17

## 2024-05-07 RX ADMIN — INSULIN LISPRO 1 UNITS: 100 INJECTION, SOLUTION INTRAVENOUS; SUBCUTANEOUS at 18:55

## 2024-05-07 RX ADMIN — IPRATROPIUM BROMIDE AND ALBUTEROL SULFATE 3 ML: 2.5; .5 SOLUTION RESPIRATORY (INHALATION) at 15:26

## 2024-05-07 RX ADMIN — TRIMETHOBENZAMIDE HYDROCHLORIDE 200 MG: 100 INJECTION INTRAMUSCULAR at 20:31

## 2024-05-07 RX ADMIN — IOHEXOL 60 ML: 350 INJECTION, SOLUTION INTRAVENOUS at 09:07

## 2024-05-07 RX ADMIN — HYDROMORPHONE HYDROCHLORIDE 0.5 MG: 1 INJECTION, SOLUTION INTRAMUSCULAR; INTRAVENOUS; SUBCUTANEOUS at 16:34

## 2024-05-07 RX ADMIN — OXYCODONE HYDROCHLORIDE 10 MG: 10 TABLET ORAL at 20:58

## 2024-05-07 NOTE — CONSULTS
Consultation - Pulmonary Medicine   Vesna Langston 64 y.o. female MRN: [id number]  Unit/Bed#: ED-18 Encounter: [id number]      Assessment:  Ms. Langston is a 64 year old female with PMH Aflutter on Eliquis, CAD S/P STEMI March 2024  who presented with abdominal pain and found to have acute appendicitis with an incidental finding of a single submental right lower lobe pulmonary embolus on abdominal CT.   Compliant to Eliquis with last dose 5/6  No recent travel, quit smoking 7 weeks ago. Reports multiple falls since back surgery approximately 1 year ago  Does not oxygen at baseline    Plan:   Continue with Heparin drip.  Will transition back to Eliquis if no further surgery.     History of Present Illness   Physician Requesting Consult: Billy Colin DO  Reason for Consult / Principal Problem: Evaluation for Pulmonary Embolism  HPI: Vesna Langston is a 64 y.o. year old female with PMH of DM, CHF, CAD status post PCI intervention/ ICD and hyperlipidemia  who presents with abdominal pain and found to have acute appendicitis.Patient was found to have a submental right lower lobe pulmonary embolus. She was started on Eliquis due to Aflutter and reports being compliant with her medication. She denied recent travel or long distance driving, oxygen usage at home and recent illness. She stated that she had multiple falls since her back surgery in August. She endorsed smoking cessation 7 weeks ago. Patient denied shortness of breath, chest pain, but reported abdominal pain.     Inpatient consult to Pulmonology  Consult performed by: Bhaskar Ornelas DO  Consult ordered by: Jason Abraham MD              Historical Information   Past Medical History:   Diagnosis Date    Acute respiratory insufficiency 03/22/2024    Allergic     Chronic HFrEF (heart failure with reduced ejection fraction) (HCC)     Coronary artery disease     COVID-19 virus infection 11/28/2022    Diabetes mellitus (HCC)     Disease of thyroid gland 4/09/2024     Hyperlipidemia     Hypoglycemia     ICD (implantable cardioverter-defibrillator) in place     Ischemic cardiomyopathy     Lactic acidosis 2024    Myocardial infarction (HCC) 3/22/2024    Otitis media 1966    ST elevation myocardial infarction involving left anterior descending (LAD) coronary artery (HCC) 2024    Tobacco abuse     Visual impairment 1967     Past Surgical History:   Procedure Laterality Date    ADENOIDECTOMY      CARDIAC CATHETERIZATION N/A 2024    Procedure: Cardiac pci;  Surgeon: Gabriel Myers MD;  Location: [facility];  Service: Cardiology    CARDIAC CATHETERIZATION N/A 2024    Procedure: Cardiac Coronary Angiogram;  Surgeon: Gabriel Myers MD;  Location: [facility];  Service: Cardiology    CARDIAC CATHETERIZATION N/A 2024    Procedure: Cardiac PCI AMI;  Surgeon: Gabriel Myers MD;  Location: [facility];  Service: Cardiology    CARDIAC CATHETERIZATION N/A 2024    Procedure: Cardiac IVUS;  Surgeon: Gabriel Myers MD;  Location: [facility];  Service: Cardiology    CARDIAC ELECTROPHYSIOLOGY PROCEDURE N/A 2024    Procedure: Cardiac icd implant;  Surgeon: Jeffy Lama MD;  Location: [facility];  Service: Cardiology     SECTION      ECTOPIC PREGNANCY SURGERY      SEPTOPLASTY      SPINE SURGERY  23    TONSILLECTOMY       Social History   Social History     Substance and Sexual Activity   Alcohol Use Not Currently    Alcohol/week: 1.0 standard drink of alcohol    Types: 1 Shots of liquor per week    Comment: rarely     Social History     Substance and Sexual Activity   Drug Use Never     E-Cigarette/Vaping    E-Cigarette Use Never User      E-Cigarette/Vaping Substances    Nicotine No     THC No     CBD No     Flavoring No     Other No     Unknown No      Social History     Tobacco Use   Smoking Status Former    Current packs/day: 0.00    Average packs/day: 1 pack/day for 24.2 years (24.2 ttl pk-yrs)    Types:  "Cigarettes    Start date: 2000    Quit date: 3/22/2024    Years since quittin.1   Smokeless Tobacco Never   Tobacco Comments    Smoked 0.5-1 ppd; quit in 2024.        Family History:   Family History   Problem Relation Age of Onset    Depression Mother     Heart disease Father     OCD Daughter        Meds/Allergies   all current active meds have been reviewed    Allergies   Allergen Reactions    Shellfish-Derived Products - Food Allergy Anaphylaxis    Azithromycin Rash       Objective   Vitals: Blood pressure 96/51, pulse 89, temperature 98.2 °F (36.8 °C), temperature source Oral, resp. rate 17, height 5' 8\" (1.727 m), weight 93.9 kg (207 lb), SpO2 94%.,Body mass index is 31.47 kg/m².    Intake/Output Summary (Last 24 hours) at 2024 1744  Last data filed at 2024 1034  Gross per 24 hour   Intake 550 ml   Output --   Net 550 ml     Invasive Devices       Peripheral Intravenous Line  Duration             Peripheral IV 24 Left Antecubital <1 day    Peripheral IV 24 Right;Ventral (anterior) Hand <1 day                    Physical Exam  Constitutional:       Appearance: Normal appearance.   HENT:      Head: Normocephalic and atraumatic.      Mouth/Throat:      Mouth: Mucous membranes are moist.   Cardiovascular:      Rate and Rhythm: Normal rate and regular rhythm.   Pulmonary:      Effort: Pulmonary effort is normal.      Breath sounds: Normal breath sounds.   Abdominal:      Tenderness: There is abdominal tenderness.   Musculoskeletal:      Right lower leg: No edema.      Left lower leg: No edema.   Skin:     General: Skin is warm.   Neurological:      Mental Status: She is alert and oriented to person, place, and time.         Lab Results: I have personally reviewed pertinent lab results.  Imaging Studies: I have personally reviewed pertinent reports.    EKG, Pathology, and Other Studies: I have personally reviewed pertinent reports.    VTE Prophylaxis: Heparin    Code Status: Level 1 - " Full Code  Advance Directive and Living Will:      Power of :    POLST:

## 2024-05-07 NOTE — PLAN OF CARE
Problem: Potential for Falls  Goal: Patient will remain free of falls  Description: INTERVENTIONS:  - Educate patient/family on patient safety including physical limitations  - Instruct patient to call for assistance with activity   - Consult OT/PT to assist with strengthening/mobility   - Keep Call bell within reach  - Keep bed low and locked with side rails adjusted as appropriate  - Keep care items and personal belongings within reach  - Initiate and maintain comfort rounds  - Make Fall Risk Sign visible to staff  Outcome: Progressing     Problem: PAIN - ADULT  Goal: Verbalizes/displays adequate comfort level or baseline comfort level  Description: Interventions:  - Encourage patient to monitor pain and request assistance  - Assess pain using appropriate pain scale  - Administer analgesics based on type and severity of pain and evaluate response  - Implement non-pharmacological measures as appropriate and evaluate response  - Consider cultural and social influences on pain and pain management  - Notify physician/advanced practitioner if interventions unsuccessful or patient reports new pain  Outcome: Progressing     Problem: INFECTION - ADULT  Goal: Absence or prevention of progression during hospitalization  Description: INTERVENTIONS:  - Assess and monitor for signs and symptoms of infection  - Monitor lab/diagnostic results  - Monitor all insertion sites, i.e. indwelling lines, tubes, and drains  - Monitor endotracheal if appropriate and nasal secretions for changes in amount and color  - [place] appropriate cooling/warming therapies per order  - Administer medications as ordered  - Instruct and encourage patient and family to use good hand hygiene technique  - Identify and instruct in appropriate isolation precautions for identified infection/condition  Outcome: Progressing     Problem: DISCHARGE PLANNING  Goal: Discharge to home or other facility with appropriate resources  Description: INTERVENTIONS:  -  Identify barriers to discharge w/patient and caregiver  - Arrange for needed discharge resources and transportation as appropriate  - Identify discharge learning needs (meds, wound care, etc.)  - Arrange for interpretive services to assist at discharge as needed  - Refer to Case Management Department for coordinating discharge planning if the patient needs post-hospital services based on physician/advanced practitioner order or complex needs related to functional status, cognitive ability, or social support system  Outcome: Progressing     Problem: Knowledge Deficit  Goal: Patient/family/caregiver demonstrates understanding of disease process, treatment plan, medications, and discharge instructions  Description: Complete learning assessment and assess knowledge base.  Interventions:  - Provide teaching at level of understanding  - Provide teaching via preferred learning methods  Outcome: Progressing

## 2024-05-07 NOTE — ED NOTES
Educated patient on importance of third IV to get LR infusion. MD aware of patient not wanting a third IV.       Patient is now allowing one more chance for IV insertion with US. MD aware.     Migdalia Caraballo RN  05/07/24 [id number]

## 2024-05-07 NOTE — ED PROVIDER NOTES
History  Chief Complaint   Patient presents with    Abdominal Pain     Lower abdominal pain that radiates straight through to lower back since 2300 last night. Also report n/v. Denies CP or epigastric. Has SOB, reports h/o CHF and SOB mildly worse     HPI  64-year-old female history of heart failure EF 35%, diabetes, CAD, hyperlipidemia, hypothyroidism who presents to the emergency department for lower abdominal pain started at 11:00 last night.  Patient reports associated nausea and vomiting.  Patient states she had streaks of blood in her emesis this morning.  Patient denies fever, chills, chest pain, shortness of breath, diarrhea, constipation, bladder changes.    Prior to Admission Medications   Prescriptions Last Dose Informant Patient Reported? Taking?   Empagliflozin (Jardiance) 25 MG TABS   No No   Sig: Take 1 tablet (25 mg total) by mouth every morning   Patient not taking: Reported on 5/6/2024   Multiple Vitamin (MULTIVITAMIN) capsule  Self Yes No   Sig: Take 1 capsule by mouth daily   acetaminophen (TYLENOL) 650 mg CR tablet  Self Yes No   Sig: Take 650 mg by mouth every 8 (eight) hours as needed   albuterol (ProAir HFA) 90 mcg/act inhaler  Self No No   Sig: Inhale 2 puffs every 6 (six) hours as needed for wheezing   amiodarone 200 mg tablet  Self No No   Sig: Take 1 tablet (200 mg total) by mouth daily   apixaban (ELIQUIS) 5 mg  Self No No   Sig: Take 1 tablet (5 mg total) by mouth 2 (two) times a day   atorvastatin (LIPITOR) 80 mg tablet  Self No No   Sig: Take 1 tablet (80 mg total) by mouth every evening   clopidogrel (PLAVIX) 75 mg tablet  Self No No   Sig: Take 1 tablet (75 mg total) by mouth daily   escitalopram (LEXAPRO) 10 mg tablet  Self No No   Sig: Take 1 tablet (10 mg total) by mouth daily   furosemide (LASIX) 20 mg tablet  Self No No   Sig: Take 1 tablet (20 mg total) by mouth as needed (for rapid weight gain (3+ lbs overnight or 5+ lbs in 5-7 days) or worsening shortness of breath)    gabapentin (Neurontin) 100 mg capsule  Self No No   Sig: Take 1 capsule (100 mg total) by mouth 2 (two) times a day   levothyroxine (Synthroid) 50 mcg tablet  Self No No   Sig: Take 1 tablet (50 mcg total) by mouth daily   meclizine (ANTIVERT) 25 mg tablet  Self No No   Sig: Take 1 tablet (25 mg total) by mouth every 8 (eight) hours as needed for dizziness   metoprolol succinate (TOPROL-XL) 25 mg 24 hr tablet   No No   Sig: Take 1 tablet (25 mg total) by mouth daily at bedtime   pantoprazole (PROTONIX) 40 mg tablet  Self No No   Sig: Take 1 tablet (40 mg total) by mouth 2 (two) times a day before meals   sacubitril-valsartan (Entresto) 24-26 MG TABS   No No   Sig: Take 1 tablet by mouth 2 (two) times a day   Patient not taking: Reported on 5/6/2024   sucralfate (CARAFATE) 1 g tablet  Self No No   Sig: Take 1 tablet (1 g total) by mouth 4 (four) times a day (before meals and at bedtime)      Facility-Administered Medications: None       Past Medical History:   Diagnosis Date    Acute respiratory insufficiency 03/22/2024    Allergic     Chronic HFrEF (heart failure with reduced ejection fraction) ([facility])     Coronary artery disease     COVID-19 virus infection 11/28/2022    Diabetes mellitus ([facility])     Disease of thyroid gland 4/09/2024    Hyperlipidemia     Hypoglycemia     ICD (implantable cardioverter-defibrillator) in place     Ischemic cardiomyopathy     Lactic acidosis 03/22/2024    Myocardial infarction ([facility]) 3/22/2024    Otitis media 1966    ST elevation myocardial infarction involving left anterior descending (LAD) coronary artery ([facility]) 03/22/2024    Tobacco abuse     Visual impairment 1967       Past Surgical History:   Procedure Laterality Date    ADENOIDECTOMY      CARDIAC CATHETERIZATION N/A 03/22/2024    Procedure: Cardiac pci;  Surgeon: Gabriel Myers MD;  Location: [facility];  Service: Cardiology    CARDIAC CATHETERIZATION N/A 03/22/2024    Procedure: Cardiac Coronary Angiogram;  Surgeon: Gabriel  MD Louis;  Location: [facility];  Service: Cardiology    CARDIAC CATHETERIZATION N/A 2024    Procedure: Cardiac PCI AMI;  Surgeon: Gabriel Myers MD;  [facility];  Service: Cardiology    CARDIAC CATHETERIZATION N/A 2024    Procedure: Cardiac IVUS;  Surgeon: Gabriel Myers MD;  [facility]: [facility];  Service: Cardiology    CARDIAC ELECTROPHYSIOLOGY PROCEDURE N/A 2024    Procedure: Cardiac icd implant;  Surgeon: Jeffy Lama MD;  Location: [facility];  Service: Cardiology     SECTION      ECTOPIC PREGNANCY SURGERY      SEPTOPLASTY      SPINE SURGERY  23    TONSILLECTOMY         Family History   Problem Relation Age of Onset    Depression Mother     Heart disease Father     OCD Daughter      I have reviewed and agree with the history as documented.    E-Cigarette/Vaping    E-Cigarette Use Never User      E-Cigarette/Vaping Substances    Nicotine No     THC No     CBD No     Flavoring No     Other No     Unknown No      Social History     Tobacco Use    Smoking status: Former     Current packs/day: 0.00     Average packs/day: 1 pack/day for 24.2 years (24.2 ttl pk-yrs)     Types: Cigarettes     Start date: 2000     Quit date: 3/22/2024     Years since quittin.1    Smokeless tobacco: Never    Tobacco comments:     Smoked 0.5-1 ppd; quit in 2024.    Vaping Use    Vaping status: Never Used   Substance Use Topics    Alcohol use: Not Currently     Alcohol/week: 1.0 standard drink of alcohol     Types: 1 Shots of liquor per week     Comment: rarely    Drug use: Never        Review of Systems   Constitutional:  Negative for chills and fever.   HENT:  Negative for ear pain and sore throat.    Eyes:  Negative for pain and visual disturbance.   Respiratory:  Negative for cough and shortness of breath.    Cardiovascular:  Negative for chest pain and palpitations.   Gastrointestinal:  Positive for abdominal pain, nausea and vomiting. Negative for  blood in stool, constipation and diarrhea.        +blood tinged sputum   Genitourinary:  Negative for dysuria and hematuria.   Musculoskeletal:  Negative for arthralgias and back pain.   Skin:  Negative for color change and rash.   Neurological:  Negative for seizures and syncope.   All other systems reviewed and are negative.      Physical Exam  ED Triage Vitals   Temperature Pulse Respirations Blood Pressure SpO2   05/07/24 0643 05/07/24 0641 05/07/24 0641 05/07/24 0641 05/07/24 0641   98.2 °F (36.8 °C) 78 18 135/67 95 %      Temp Source Heart Rate Source Patient Position - Orthostatic VS BP Location FiO2 (%)   05/07/24 0643 05/07/24 0641 05/07/24 0641 05/07/24 0641 --   Oral Monitor Sitting Right arm       Pain Score       05/07/24 0641       8             Orthostatic Vital Signs  Vitals:    05/07/24 1145 05/07/24 1245 05/07/24 1431 05/07/24 1500   BP:   112/58 112/58   Pulse: 102 88 88 88   Patient Position - Orthostatic VS:  Lying  Lying       Physical Exam  Vitals and nursing note reviewed.   Constitutional:       General: She is not in acute distress.     Appearance: She is well-developed.   HENT:      Head: Normocephalic and atraumatic.   Eyes:      Conjunctiva/sclera: Conjunctivae normal.   Cardiovascular:      Rate and Rhythm: Normal rate and regular rhythm.      Heart sounds: No murmur heard.  Pulmonary:      Effort: Pulmonary effort is normal. No respiratory distress.      Breath sounds: Normal breath sounds.   Abdominal:      Palpations: Abdomen is soft.      Tenderness: There is abdominal tenderness (most signficant in RLQ) in the right lower quadrant, suprapubic area and left lower quadrant. There is guarding. There is no rebound.   Musculoskeletal:         General: No swelling.      Cervical back: Neck supple.   Skin:     General: Skin is warm and dry.      Capillary Refill: Capillary refill takes less than 2 seconds.   Neurological:      Mental Status: She is alert.   Psychiatric:         Mood and  Affect: Mood normal.         ED Medications  Medications   piperacillin-tazobactam (ZOSYN) IVPB 4.5 g (0 g Intravenous Stopped 5/7/24 1034)     Followed by   piperacillin-tazobactam (ZOSYN) IVPB (EXTENDED INFUSION) 4.5 g (4.5 g Intravenous Given 5/7/24 1605)   lactated ringers infusion (125 mL/hr Intravenous New Bag 5/7/24 1043)   acetaminophen (TYLENOL) tablet 650 mg (has no administration in time range)   oxyCODONE (ROXICODONE) immediate release tablet 10 mg (has no administration in time range)   oxyCODONE (ROXICODONE) IR tablet 5 mg (has no administration in time range)   HYDROmorphone (DILAUDID) injection 0.5 mg (has no administration in time range)   pantoprazole (PROTONIX) injection 40 mg (40 mg Intravenous Given 5/7/24 1037)   trimethobenzamide (TIGAN) IM injection 200 mg (has no administration in time range)   dextrose 5 % and sodium chloride 0.45 % with KCl 20 mEq/L infusion (has no administration in time range)   heparin (porcine) 25,000 units in 0.45% NaCl 250 mL infusion (premix) (18 Units/kg/hr × 90 kg (Order-Specific) Intravenous Continued from OR 5/7/24 1422)   heparin (porcine) injection 7,200 Units (has no administration in time range)   heparin (porcine) injection 3,600 Units (has no administration in time range)   clopidogrel (PLAVIX) tablet 75 mg (75 mg Oral Given 5/7/24 1603)   amiodarone tablet 200 mg (200 mg Oral Given 5/7/24 1603)   metoprolol tartrate (LOPRESSOR) tablet 12.5 mg (has no administration in time range)   ipratropium-albuterol (DUO-NEB) 0.5-2.5 mg/3 mL inhalation solution 3 mL (3 mL Nebulization Given 5/7/24 1526)   albuterol (PROVENTIL HFA,VENTOLIN HFA) inhaler 2 puff (has no administration in time range)   insulin lispro (HumALOG/ADMELOG) 100 units/mL subcutaneous injection 1-6 Units (has no administration in time range)   ondansetron (ZOFRAN) injection 4 mg (4 mg Intravenous Given 5/7/24 7472)   acetaminophen (Ofirmev) injection 1,000 mg (0 mg Intravenous Stopped 5/7/24 5664)    iohexol (OMNIPAQUE) 350 MG/ML injection (MULTI-DOSE) 100 mL (100 mL Intravenous Given 5/7/24 0802)   ceftriaxone (ROCEPHIN) 1 g/50 mL in dextrose IVPB (0 mg Intravenous Stopped 5/7/24 0909)   iohexol (OMNIPAQUE) 350 MG/ML injection (SINGLE-DOSE) 60 mL (60 mL Intravenous Given 5/7/24 0907)   fentaNYL injection 50 mcg (50 mcg Intravenous Given 5/7/24 0953)   fentaNYL injection 50 mcg (50 mcg Intravenous Given 5/7/24 1037)   ondansetron (ZOFRAN) injection 4 mg (4 mg Intravenous Given 5/7/24 1135)   heparin (porcine) injection 7,200 Units (7,200 Units Intravenous Given 5/7/24 1418)   perflutren lipid microsphere (DEFINITY) injection (0.6 mL/min Intravenous Given 5/7/24 1444)       Diagnostic Studies  Results Reviewed       Procedure Component Value Units Date/Time    APTT [[id number]]  (Normal) Collected: 05/07/24 1247    Lab Status: Final result Specimen: Blood from Arm, Right Updated: 05/07/24 1336     PTT 27 seconds     Protime-INR [[id number]]  (Abnormal) Collected: 05/07/24 1247    Lab Status: Final result Specimen: Blood from Arm, Right Updated: 05/07/24 1336     Protime 14.6 seconds      INR 1.08    HS Troponin I 2hr [[id number]]  (Normal) Collected: 05/07/24 1004    Lab Status: Final result Specimen: Blood from Arm, Right Updated: 05/07/24 1042     hs TnI 2hr 6 ng/L      Delta 2hr hsTnI -2 ng/L     Urine Microscopic [[id number]]  (Normal) Collected: 05/07/24 0950    Lab Status: Final result Specimen: Urine, Clean Catch Updated: 05/07/24 1010     RBC, UA 1-2 /hpf      WBC, UA 1-2 /hpf      Epithelial Cells Occasional /hpf      Bacteria, UA None Seen /hpf     UA (URINE) with reflex to Scope [[id number]]  (Abnormal) Collected: 05/07/24 0950    Lab Status: Final result Specimen: Urine, Clean Catch Updated: 05/07/24 1007     Color, UA Light Yellow     Clarity, UA Clear     Specific Gravity, UA >=1.050     pH, UA 7.5     Leukocytes, UA Negative     Nitrite, UA Negative     Protein, UA 30 (1+) mg/dl      Glucose, UA  Negative mg/dl      Ketones, UA Negative mg/dl      Urobilinogen, UA <2.0 mg/dl      Bilirubin, UA Negative     Occult Blood, UA Negative    Comprehensive metabolic panel [[id number]]  (Abnormal) Collected: 05/07/24 0647    Lab Status: Final result Specimen: Blood from Arm, Left Updated: 05/07/24 0740     Sodium 138 mmol/L      Potassium 3.9 mmol/L      Chloride 103 mmol/L      CO2 24 mmol/L      ANION GAP 11 mmol/L      BUN 13 mg/dL      Creatinine 0.79 mg/dL      Glucose 151 mg/dL      Calcium 9.5 mg/dL      AST 23 U/L      ALT 23 U/L      Alkaline Phosphatase 44 U/L      Total Protein 7.2 g/dL      Albumin 4.3 g/dL      Total Bilirubin 1.01 mg/dL      eGFR 78 ml/min/1.73sq m     Narrative:      National Kid[employer] Foundation guidelines for Chronic Kidney Disease (CKD):     Stage 1 with normal or high GFR (GFR > 90 mL/min/1.73 square meters)    Stage 2 Mild CKD (GFR = 60-89 mL/min/1.73 square meters)    Stage 3A Moderate CKD (GFR = 45-59 mL/min/1.73 square meters)    Stage 3B Moderate CKD (GFR = 30-44 mL/min/1.73 square meters)    Stage 4 Severe CKD (GFR = 15-29 mL/min/1.73 square meters)    Stage 5 End Stage CKD (GFR <15 mL/min/1.73 square meters)  Note: GFR calculation is accurate only with a steady state creatinine    Lipase [[id number]]  (Normal) Collected: 05/07/24 0647    Lab Status: Final result Specimen: Blood from Arm, Left Updated: 05/07/24 0740     Lipase 16 u/L     HS Troponin 0hr (reflex protocol) [[id number]]  (Normal) Collected: 05/07/24 0647    Lab Status: Final result Specimen: Blood from Arm, Left Updated: 05/07/24 0735     hs TnI 0hr 8 ng/L     CBC and differential [[id number]]  (Abnormal) Collected: 05/07/24 0647    Lab Status: Final result Specimen: Blood from Arm, Left Updated: 05/07/24 0712     WBC 16.82 Thousand/uL      RBC 3.99 Million/uL      Hemoglobin 11.7 g/dL      Hematocrit 36.7 %      MCV 92 fL      MCH 29.3 pg      MCHC 31.9 g/dL      RDW 14.3 %      MPV 10.1 fL      Platelets 305  Thousands/uL      nRBC 0 /100 WBCs      Segmented % 82 %      Immature Grans % 0 %      Lymphocytes % 9 %      Monocytes % 7 %      Eosinophils Relative 2 %      Basophils Relative 0 %      Absolute Neutrophils 13.65 Thousands/µL      Absolute Immature Grans 0.06 Thousand/uL      Absolute Lymphocytes 1.57 Thousands/µL      Absolute Monocytes 1.22 Thousand/µL      Eosinophils Absolute 0.27 Thousand/µL      Basophils Absolute 0.05 Thousands/µL                    CTA ED chest PE study   Final Result by Lindy Ghosh MD (05/07 0934)      Single subsegmental right lower lobe pulmonary embolus as shown on abdominal CT.      RV/LV diameter ratio less than 1 with nothing to indicate right heart strain.      Question mild interstitial edema.            Workstation performed: [id number]         CT abdomen pelvis with contrast   Final Result by Jason Dominguez MD (05/07 0829)         1. Acute appendicitis.   2. Partial visualization of suspected pulmonary embolus. Consider dedicated chest CTA to further evaluate.         I personally discussed this study with LEANNE HWANG on 5/7/2024 8:28 AM.               Workstation performed: [id number]          VAS VENOUS DUPLEX - LOWER LIMB BILATERAL    (Results Pending)         Procedures  ECG 12 Lead Documentation Only    Date/Time: 5/7/2024 6:45 AM    Performed by: Leanne Hwang DO  Authorized by: Leanne Hwang DO    Patient location:  ED  Previous ECG:     Previous ECG:  Compared to current    Similarity:  No change  Interpretation:     Interpretation: normal    Rate:     ECG rate:  71    ECG rate assessment: normal    Rhythm:     Rhythm: sinus rhythm    Ectopy:     Ectopy: none    QRS:     QRS axis:  Normal    QRS intervals:  Normal  Conduction:     Conduction: normal    ST segments:     ST segments:  Normal  T waves:     T waves: normal    Other findings:     Other findings: prolonged qTc interval          ED Course  ED Course as of 05/07/24 8681    Tue May 07, 2024   0727 CBC and differential(!)  Leukocytosis 16.82. Hgb wnl. Hct wnl. Plt wnl.    0739 hs TnI 0hr: 8  EKG does not show any acute ischemic changes.   0743 Comprehensive metabolic panel(!)  Electrolytes WNL, except for slightly elevated glucose. Mildly elevated T. Bili. Otherwise unremarkable   0743 LIPASE: 16  Unlikely d/t pancreatitis   0845 Spoke with radiology, CT AP with lower lung cut concerning for PE, recommended CTE PE study. Appendicitis on CT as well.  Patient states that she feels short of breath but states is most likely due to the pain.  She did not have shortness of breath prior to the pain starting.  Patient denies chest pain.   0848 IMPRESSION:        1. Acute appendicitis.  2. Partial visualization of suspected pulmonary embolus. Consider dedicated chest CTA to further evaluate.     0953 CTA ED chest PE study  IMPRESSION:     Single subsegmental right lower lobe pulmonary embolus as shown on abdominal CT.     RV/LV diameter ratio less than 1 with nothing to indicate right heart strain.     Question mild interstitial edema.     1008 Surgery team to admit                             SBIRT 22yo+      Flowsheet Row Most Recent Value   Initial Alcohol Screen: US AUDIT-C     1. How often do you have a drink containing alcohol? 1 Filed at: 05/07/2024 0727   2. How many drinks containing alcohol do you have on a typical day you are drinking?  0 Filed at: 05/07/2024 0727   3a. Male UNDER 65: How often do you have five or more drinks on one occasion? 0 Filed at: 05/07/2024 0727   3b. FEMALE Any Age, or MALE 65+: How often do you have 4 or more drinks on one occassion? 0 Filed at: 05/07/2024 0727   Audit-C Score 1 Filed at: 05/07/2024 0727   CHRIS: How many times in the past year have you...    Used an illegal drug or used a prescription medication for non-medical reasons? Never Filed at: 05/07/2024 0727                  Medical Decision Making  65 yo F presented to ED for abd pain.  Associated symptoms: NV. Exam findings: diffuse lower abdominal tenderness.  Differentials diagnoses considered: Appendicitis versus UTI versus constipation versus diverticulitis versus renal stone versus pancreatitis. Patient was given Tylenol  IV for pain. On re-examination, patient had normal improvement.  She was given fentanyl additionally for pain with some relief..  See ED course for more details on lab and imaging interpretation. Based on these results and H&P, most likely due to appendicitis. Results and clinical impressions were discussed with patient and family. They expressed understanding. Plan: Admit to surgery for further evaluation. This plan was also discussed with patient, who was agreeable with this plan. Patient was given the opportunity to ask questions in ED. All questions and concerns were addressed in ED.     Amount and/or Complexity of Data Reviewed  Labs: ordered. Decision-making details documented in ED Course.  Radiology: ordered. Decision-making details documented in ED Course.    Risk  Prescription drug management.  Decision regarding hospitalization.          Disposition  Final diagnoses:   Acute appendicitis with localized peritonitis, without perforation, abscess, or gangrene   PE (pulmonary thromboembolism) ([facility])     Time reflects when diagnosis was documented in both MDM as applicable and the Disposition within this note       Time User Action Codes Description Comment    5/7/2024  9:56 AM Jason Abraham Add [I48.92] Atrial flutter, paroxysmal ([facility])     5/7/2024  9:57 AM Jason Abraham Add [J06.9] Viral upper respiratory tract infection     5/7/2024  9:57 AM Jason Abraham Remove [J06.9] Viral upper respiratory tract infection     5/7/2024  9:57 AM Jason Abraham Add [I26.99] Acute pulmonary embolism, unspecified pulmonary embolism type, unspecified whether acute cor pulmonale present ([facility])     5/7/2024 10:57 AM Maury Ortiz Add [K35.80] Acute appendicitis, unspecified acute  appendicitis type     5/7/2024 12:52 PM Li Doyle Add [K35.30] Acute appendicitis with localized peritonitis, without perforation, abscess, or gangrene     5/7/2024 12:52 PM Li Dyole Add [I26.99] PE (pulmonary thromboembolism) ([facility])     5/7/2024 12:53 PM Li Doyle Modify [I48.92] Atrial flutter, paroxysmal ([facility])     5/7/2024 12:53 PM Li Doyle Modify [K35.30] Acute appendicitis with localized peritonitis, without perforation, abscess, or gangrene           ED Disposition       ED Disposition   Admit    Condition   Stable    Date/Time   Tue May 7, 2024 0909    Comment                  Follow-up Information    None         Patient's Medications   Discharge Prescriptions    No medications on file     No discharge procedures on file.    PDMP Review         Value Time User    PDMP Reviewed  Yes 4/1/2024  1:34 AM Pio Rangel MD             ED Provider  Attending physically available and evaluated Vesna Langston. I managed the patient along with the ED Attending.    Electronically Signed by           Nilda Hwang DO  05/07/24 8114

## 2024-05-07 NOTE — H&P
H&P - General Surgery  Vesna Langston 64 y.o. female MRN: [id number]  Unit/Bed#: ED-18 Encounter: [id number]        Assessment/Plan     Assessment:  Vesna Langston is a 64 y.o. female with past medical history of obesity (BMI 31), a flutter (eliquis, last dose 5/6 PM), CAD s/p STEMI 2024 (s/p cardiac cath with stent, now on plavic), ischemic cardiomyopathy (EF 35%, s/p dual chamber ICD 2024), UGI bleed (s/p EGD 24), and chronic back pain (oxycodone) who presents with acute appendicitis and segmental pulmonary embolism    Plan:  Please see Dr. Colin progress note from earlier today for attending attestation  Will start with trial of antibiotics  Cardiology recs appreciated  Pulmonology recs appreciated  Pain control PRN    History of Present Illness     HPI:  Vesna Langston is a 64 y.o. female with the above past medical history who presents with abdominal pain which started last night at around 11 PM.  Associated with nausea and vomiting.    Past surgical history significant for  section.  On Eliquis for history of a flutter and Plavix for CAD status post stenting.    Review of Systems   Constitutional:  Negative for chills and fever.   HENT:  Negative for ear pain and sore throat.    Eyes:  Negative for pain and visual disturbance.   Respiratory:  Negative for cough and shortness of breath.    Cardiovascular:  Negative for chest pain and palpitations.   Gastrointestinal:  Positive for abdominal pain, nausea and vomiting.   Genitourinary:  Negative for dysuria and hematuria.   Musculoskeletal:  Negative for arthralgias and back pain.   Skin:  Negative for color change and rash.   Neurological:  Negative for seizures and syncope.   All other systems reviewed and are negative.        Historical Information   Past Medical History:   Diagnosis Date    Acute respiratory insufficiency 2024    Allergic     Chronic HFrEF (heart failure with reduced ejection fraction) (HCC)     Coronary artery disease      COVID-19 virus infection 2022    Diabetes mellitus (HCC)     Disease of thyroid gland 2024    Hyperlipidemia     Hypoglycemia     ICD (implantable cardioverter-defibrillator) in place     Ischemic cardiomyopathy     Lactic acidosis 2024    Myocardial infarction (HCC) 3/22/2024    Otitis media 1966    ST elevation myocardial infarction involving left anterior descending (LAD) coronary artery (HCC) 2024    Tobacco abuse     Visual impairment 1967     Past Surgical History:   Procedure Laterality Date    ADENOIDECTOMY      CARDIAC CATHETERIZATION N/A 2024    Procedure: Cardiac pci;  Surgeon: Gabriel Myers MD;  [facility];  Service: Cardiology    CARDIAC CATHETERIZATION N/A 2024    Procedure: Cardiac Coronary Angiogram;  Surgeon: Gabriel Myers MD;  [facility];  Service: Cardiology    CARDIAC CATHETERIZATION N/A 2024    Procedure: Cardiac PCI AMI;  Surgeon: Gabriel Myers MD;  [facility];  Service: Cardiology    CARDIAC CATHETERIZATION N/A 2024    Procedure: Cardiac IVUS;  Surgeon: Gabriel Myers MD;  [facility];  Service: Cardiology    CARDIAC ELECTROPHYSIOLOGY PROCEDURE N/A 2024    Procedure: Cardiac icd implant;  Surgeon: Jeffy Lama MD;  [facility];  Service: Cardiology     SECTION      ECTOPIC PREGNANCY SURGERY      SEPTOPLASTY      SPINE SURGERY  23    TONSILLECTOMY       Social History   Social History     Substance and Sexual Activity   Alcohol Use Not Currently    Alcohol/week: 1.0 standard drink of alcohol    Types: 1 Shots of liquor per week    Comment: rarely     Social History     Substance and Sexual Activity   Drug Use Never     Social History     Tobacco Use   Smoking Status Former    Current packs/day: 0.00    Average packs/day: 1 pack/day for 24.2 years (24.2 ttl pk-yrs)    Types: Cigarettes    Start date: 2000    Quit date: 3/22/2024    Years  "since quittin.1   Smokeless Tobacco Never   Tobacco Comments    Smoked 0.5-1 ppd; quit in 2024.      Family History:   Family History   Problem Relation Age of Onset    Depression Mother     Heart disease Father     OCD Daughter        Meds/Allergies   all medications and allergies reviewed  Allergies   Allergen Reactions    Shellfish-Derived Products - Food Allergy Anaphylaxis    Azithromycin Rash       Objective   First Vitals:   Blood Pressure: 135/67 (24 0641)  Pulse: 78 (24 06)  Temperature: 98.2 °F (36.8 °C) (24 0643)  Temp Source: Oral (24 0643)  Respirations: 18 (24 06)  Height: 5' 8\" (172.7 cm) (24 07)  Weight - Scale: 94 kg (207 lb 3.7 oz) (24 06)  SpO2: 95 % (24 06)    Current Vitals:   Blood Pressure: 112/58 (24 1500)  Pulse: 88 (24 1500)  Temperature: 98.2 °F (36.8 °C) (24 0643)  Temp Source: Oral (24 0643)  Respirations: 17 (24 1500)  Height: 5' 8\" (172.7 cm) (24)  Weight - Scale: 94 kg (207 lb 3.7 oz) (24 0640)  SpO2: 91 % (24 1500)      Intake/Output Summary (Last 24 hours) at 2024 1521  Last data filed at 2024 1034  Gross per 24 hour   Intake 550 ml   Output --   Net 550 ml       Invasive Devices       Peripheral Intravenous Line  Duration             Peripheral IV 24 Right;Ventral (anterior) Hand <1 day                    Physical Exam  Constitutional:       General: She is not in acute distress.     Appearance: She is not ill-appearing or toxic-appearing.   HENT:      Head: Normocephalic.      Right Ear: External ear normal.      Left Ear: External ear normal.      Nose: Nose normal.      Mouth/Throat:      Mouth: Mucous membranes are moist.   Eyes:      Pupils: Pupils are equal, round, and reactive to light.   Cardiovascular:      Rate and Rhythm: Normal rate and regular rhythm.      Pulses: Normal pulses.   Pulmonary:      Effort: Pulmonary effort is normal. No " respiratory distress.      Breath sounds: No stridor.   Abdominal:      General: Abdomen is flat. There is no distension.      Tenderness: There is abdominal tenderness. There is no guarding or rebound.      Comments: Right lower quadrant tenderness   Musculoskeletal:         General: No swelling or tenderness. Normal range of motion.      Cervical back: Normal range of motion. No rigidity or tenderness.   Skin:     General: Skin is warm and dry.   Neurological:      General: No focal deficit present.      Mental Status: She is alert and oriented to person, place, and time.   Psychiatric:         Mood and Affect: Mood normal.           Lab Results: I have personally reviewed pertinent lab results.    Imaging: I have personally reviewed pertinent reports.    EKG, Pathology, and Other Studies: I have personally reviewed pertinent reports.      Code Status: Level 1 - Full Code  Advance Directive and Living Will:      Power of :    POLST:

## 2024-05-07 NOTE — ED NOTES
IV access obtained. Labs sent for analysis prior to beginning heparin.      Kati Simons RN  05/07/24 1045

## 2024-05-07 NOTE — CONSULTS
"Cardiology   Vesna Langston 64 y.o. female MRN: [id number]  Unit/Bed#: ED-18 Encounter: [id number]      Reason for Consult / Principal Problem: Appendicitis     Physician Requesting Consult:  Billy Colin DO    Outpatient Cardiologist: Dr. Bell    Assessment  1. Chronic HFrEF  2. Ischemic cardiomyopathy, LVEF 35%  -Appears stable/compensated.  No new/recent cardiac complaints.   -TTE 3/23/2024: LVEF 35%. LVIDd 4.7 cm. RWMA. Normal RV. Moderate MR. Mild TR. Dilated IVC.   -cMRI 03/26/2024: LVEF 20%. LVIDd 4.5 cm. \"Transmural scarring of the mid anterior, anteroseptal and inferoseptal walls, the apical anterior, septal and inferior walls and the apex.\"   -Outpatient diuretic regimen; furosemide 20 mg daily as needed.  -Inpatient diuretic regimen; none.  -Outpatient GDMT; metoprolol succinate 25 mg daily and Jardiance 10 mg daily; additional therapies limited in the past due to low BP.  -Inpatient GDMT; none  -Office weight 206 pounds on 5/6, stable.  3. STEMI in 3/22/2024;   4. CAD s/p PCI to 100% stenosis of ostial/proximal LAD (3/22/2024)  -No recent anginal symptoms.   -Previously on clopidogrel 75 mg daily/Eliquis 5 mg twice daily -currently on hold.  -Previously on atorvastatin 80 mg daily-currently on hold.  -Previously on metoprolol succinate 25 mg daily -currently on hold.  5. History of monomorphic VT s/p ICD in-situ (implanted 3/27/2024)  6. Paroxysmal atrial flutter  -Device interrogation 5/6/2024;  no evidence of ventricular tachycardia or arrhythmias.   -Previously on apixaban 5 mg twice daily PTA, currently on hold.  -Outpatient rate/rhythm control; amiodarone 200 mg daily and metoprolol succinate 25 mg daily.  6. Acute appendicitis.   -Management per the general surgery team.  No surgical plan at this time.  -On IV fluids for hydration and IV antibiotics.  7. PE - unclear chronicity   -Pulmonary has been consulted.  -Single subsegmental RLL PE, questionable mild interstitial edema.  -RV/LV diameter " ratio <1.  No indication for right heart strain.  -Previously on apixaban 5 mg twice daily PTA, currently on hold.  8. DM type II  -HgbA1c 7.9.  9. Dyslipidemia  -Lipid profile 4/22/2024; cholesterol 142, triglycerides 137, HDL 45, and LDL calculated 70.  -Previously on atorvastatin 80 mg daily-currently on hold.    Plan  -Pt is currently stable from a cardiac perspective.  -Restart clopidogrel 75 mg daily (needs uninterrupted therapy for 1 year) - recent STEMI w/PCI stenting to the ostial/prox LAD (on 3/22/24).   -Eliquis placed on hold by the general surgery team pending definitive surgical plan if necessitated.  Given acute PE would start IV heparin GTT VTE/PE high protocol; Unclear if this represents true Eliquis failure; unclear chronicity of the PE. Only recently started on Eliquis about 6 weeks ago after having been diagnosed w/atrial flutter.   -Obtain limited TTE to assess RV function in the presence pf PE. Obtain venous duplex studies to r/o DVT.   -Continue oral amiodarone 200 mg daily + metoprolol, swtich to tartrate 12.5 mg BID for now, hold for SBP <100.  -Okay to hold Jardiance.   -Okay to hold statin therapy for now.  -Okay w/ IV fluids for hydration.  -Strict I's and O's, daily weights, 2 g Na+ diet  -Monitor renal function and electrolytes closely.  Replete to maintain K+ level 4.0 magnesium level 2.0.  -Continue to monitor on telemetry.    HPI: Vesna Langston 64 y.o. year old female with a medical hx of with a medical history of chronic HFrEF, ICM EF 35%, STEMI (3/2024), CAD status post PCI to the ostial/proximal LAD (3/2024), monomorphic VT status post ICD in situ (implanted 3/27/2024), paroxysmal atrial flutter, DM type II, and dyslipidemia.  Former cigarette smoker, denies excessive alcohol or recreational/illicit drug use.  She routinely follows w/ Dr. Bell with the advanced heart failure service last seen as an outpatient yesterday as well as with Dr. Lama, seen by the EP PA yesterday.  Overall  reported doing well from both a HF/EP perspective.  She reports feeling well overall from a cardiac perspective with no current or recent cardiac complaints.  She has been compliant with her cardiac medical therapies.  She presented to the [facility] campus today with complaints of right lower quadrant abdominal pain with persistent nausea and vomiting.  CT imaging of the abdomen/pelvis demonstrated acute appendicitis, and partial visualization of a suspected PE.  She subsequently underwent a CTA PE study which demonstrated a single subsegmental right lower lobe PE; RV/LV diameter ratio <1; no indication for right heart strain.  She was evaluated by the general surgery team whom are recommending conservative therapies at this time with IV fluid hydration and IV antibiotics.     Family History:   Family History   Problem Relation Age of Onset    Depression Mother     Heart disease Father     OCD Daughter      Historical Information   Past Medical History:   Diagnosis Date    Acute respiratory insufficiency 03/22/2024    Allergic     Chronic HFrEF (heart failure with reduced ejection fraction) ([facility])     Coronary artery disease     COVID-19 virus infection 11/28/2022    Diabetes mellitus ([facility])     Disease of thyroid gland 4/09/2024    Hyperlipidemia     Hypoglycemia     ICD (implantable cardioverter-defibrillator) in place     Ischemic cardiomyopathy     Lactic acidosis 03/22/2024    Myocardial infarction ([facility]) 3/22/2024    Otitis media 1966    ST elevation myocardial infarction involving left anterior descending (LAD) coronary artery ([facility]) 03/22/2024    Tobacco abuse     Visual impairment 1967     Past Surgical History:   Procedure Laterality Date    ADENOIDECTOMY      CARDIAC CATHETERIZATION N/A 03/22/2024    Procedure: Cardiac pci;  Surgeon: aGbriel Myers MD;  [facility];  Service: Cardiology    CARDIAC CATHETERIZATION N/A 03/22/2024    Procedure: Cardiac Coronary Angiogram;  Surgeon: Gabriel Myers MD;   [facility];  Service: Cardiology    CARDIAC CATHETERIZATION N/A 2024    Procedure: Cardiac PCI AMI;  Surgeon: Gabriel Myers MD;  [facility];  Service: Cardiology    CARDIAC CATHETERIZATION N/A 2024    Procedure: Cardiac IVUS;  Surgeon: Gabriel Myers MD;  [facility];  Service: Cardiology    CARDIAC ELECTROPHYSIOLOGY PROCEDURE N/A 2024    Procedure: Cardiac icd implant;  Surgeon: Jeffy Lama MD;  [facility];  Service: Cardiology     SECTION      ECTOPIC PREGNANCY SURGERY      SEPTOPLASTY      SPINE SURGERY  23    TONSILLECTOMY       Social History   Social History     Substance and Sexual Activity   Alcohol Use Not Currently    Alcohol/week: 1.0 standard drink of alcohol    Types: 1 Shots of liquor per week    Comment: rarely     Social History     Substance and Sexual Activity   Drug Use Never     Social History     Tobacco Use   Smoking Status Former    Current packs/day: 0.00    Average packs/day: 1 pack/day for 24.2 years (24.2 ttl pk-yrs)    Types: Cigarettes    Start date: 2000    Quit date: 3/22/2024    Years since quittin.1   Smokeless Tobacco Never   Tobacco Comments    Smoked 0.5-1 ppd; quit in 2024.      Family History:   Family History   Problem Relation Age of Onset    Depression Mother     Heart disease Father     OCD Daughter        Review of Systems:  Review of Systems   Constitutional:  Positive for activity change, appetite change and fatigue. Negative for chills and fever.   Respiratory:  Negative for cough, chest tightness and shortness of breath.    Cardiovascular:  Negative for chest pain, palpitations and leg swelling.   Gastrointestinal:  Positive for abdominal pain, nausea and vomiting.   Neurological:  Negative for dizziness, light-headedness and headaches.   All other systems reviewed and are negative.          Scheduled Meds:  Current Facility-Administered Medications    Medication Dose Route Frequency Provider Last Rate    acetaminophen  650 mg Oral Q6H PRN Jason Abraham MD      [START ON 5/8/2024] dextrose 5 % and sodium chloride 0.45 % with KCl 20 mEq/L  50 mL/hr Intravenous Continuous Maury Ortiz MD      heparin (porcine)  5,000 Units Subcutaneous Q8H MONA Jason Abraham MD      HYDROmorphone  0.5 mg Intravenous Q3H PRN Jason Abraham MD      lactated ringers  125 mL/hr Intravenous Continuous Jason Abraham  mL/hr (05/07/24 1043)    oxyCODONE  10 mg Oral Q4H PRN Jason Abraham MD      oxyCODONE  5 mg Oral Q4H PRN Jason Abraham MD      pantoprazole  40 mg Intravenous Q24H [facility] Jason Abraham MD      piperacillin-tazobactam  4.5 g Intravenous Q8H Jason Abraham MD      trimethobenzamide  200 mg Intramuscular Q6H PRN Jason Abraham MD       Continuous Infusions:[START ON 5/8/2024] dextrose 5 % and sodium chloride 0.45 % with KCl 20 mEq/L, 50 mL/hr  lactated ringers, 125 mL/hr, Last Rate: 125 mL/hr (05/07/24 1043)      PRN Meds:.  acetaminophen    HYDROmorphone    oxyCODONE    oxyCODONE    trimethobenzamide  all current active meds have been reviewed and current meds:   Current Facility-Administered Medications   Medication Dose Route Frequency    acetaminophen (TYLENOL) tablet 650 mg  650 mg Oral Q6H PRN    [START ON 5/8/2024] dextrose 5 % and sodium chloride 0.45 % with KCl 20 mEq/L infusion  50 mL/hr Intravenous Continuous    heparin (porcine) subcutaneous injection 5,000 Units  5,000 Units Subcutaneous Q8H [facility]    HYDROmorphone (DILAUDID) injection 0.5 mg  0.5 mg Intravenous Q3H PRN    lactated ringers infusion  125 mL/hr Intravenous Continuous    oxyCODONE (ROXICODONE) immediate release tablet 10 mg  10 mg Oral Q4H PRN    oxyCODONE (ROXICODONE) IR tablet 5 mg  5 mg Oral Q4H PRN    pantoprazole (PROTONIX) injection 40 mg  40 mg Intravenous Q24H MONA    piperacillin-tazobactam (ZOSYN) IVPB (EXTENDED INFUSION) 4.5 g  4.5 g  "Intravenous Q8H    trimethobenzamide (TIGAN) IM injection 200 mg  200 mg Intramuscular Q6H PRN       Allergies   Allergen Reactions    Shellfish-Derived Products - Food Allergy Anaphylaxis    Azithromycin Rash       Objective   Vitals: Blood pressure 110/52, pulse 78, temperature 98.2 °F (36.8 °C), temperature source Oral, resp. rate 18, height 5' 8\" (1.727 m), weight 94 kg (207 lb 3.7 oz), SpO2 95%., Body mass index is 31.51 kg/m².,   Orthostatic Blood Pressures      Flowsheet Row Most Recent Value   Blood Pressure 110/52 filed at 05/07/2024 1030   Patient Position - Orthostatic VS Sitting filed at 05/07/2024 0641              Intake/Output Summary (Last 24 hours) at 5/7/2024 1109  Last data filed at 5/7/2024 1034  Gross per 24 hour   Intake 550 ml   Output --   Net 550 ml       Invasive Devices       Peripheral Intravenous Line  Duration             Peripheral IV 05/07/24 Left Antecubital <1 day                    Physical Exam:  Physical Exam  Vitals and nursing note reviewed.   Constitutional:       General: She is not in acute distress.     Appearance: She is obese. She is not diaphoretic.   HENT:      Head: Normocephalic and atraumatic.   Eyes:      General: No scleral icterus.  Cardiovascular:      Rate and Rhythm: Normal rate and regular rhythm.      Pulses: Normal pulses.      Heart sounds: Normal heart sounds. No murmur heard.  Pulmonary:      Effort: Pulmonary effort is normal.      Breath sounds: Normal breath sounds. No wheezing or rales.   Abdominal:      Palpations: Abdomen is soft.      Tenderness: There is abdominal tenderness.   Musculoskeletal:      Right lower leg: No edema.      Left lower leg: No edema.   Skin:     General: Skin is warm and dry.      Capillary Refill: Capillary refill takes less than 2 seconds.   Neurological:      General: No focal deficit present.      Mental Status: She is alert and oriented to person, place, and time.   Psychiatric:         Mood and Affect: Mood normal. "         Lab Results:   Recent Results (from the past 24 hour(s))   ECG 12 lead    Collection Time: 05/07/24  6:45 AM   Result Value Ref Range    Ventricular Rate 71 BPM    Atrial Rate 71 BPM    ND Interval 164 ms    QRSD Interval 80 ms    QT Interval 478 ms    QTC Interval 519 ms    P Axis 62 degrees    QRS Axis 53 degrees    T Wave Axis 88 degrees   CBC and differential    Collection Time: 05/07/24  6:47 AM   Result Value Ref Range    WBC 16.82 (H) 4.31 - 10.16 Thousand/uL    RBC 3.99 3.81 - 5.12 Million/uL    Hemoglobin 11.7 11.5 - 15.4 g/dL    Hematocrit 36.7 34.8 - 46.1 %    MCV 92 82 - 98 fL    MCH 29.3 26.8 - 34.3 pg    MCHC 31.9 31.4 - 37.4 g/dL    RDW 14.3 11.6 - 15.1 %    MPV 10.1 8.9 - 12.7 fL    Platelets 305 149 - 390 Thousands/uL    nRBC 0 /100 WBCs    Segmented % 82 (H) 43 - 75 %    Immature Grans % 0 0 - 2 %    Lymphocytes % 9 (L) 14 - 44 %    Monocytes % 7 4 - 12 %    Eosinophils Relative 2 0 - 6 %    Basophils Relative 0 0 - 1 %    Absolute Neutrophils 13.65 (H) 1.85 - 7.62 Thousands/µL    Absolute Immature Grans 0.06 0.00 - 0.20 Thousand/uL    Absolute Lymphocytes 1.57 0.60 - 4.47 Thousands/µL    Absolute Monocytes 1.22 0.17 - 1.22 Thousand/µL    Eosinophils Absolute 0.27 0.00 - 0.61 Thousand/µL    Basophils Absolute 0.05 0.00 - 0.10 Thousands/µL   Comprehensive metabolic panel    Collection Time: 05/07/24  6:47 AM   Result Value Ref Range    Sodium 138 135 - 147 mmol/L    Potassium 3.9 3.5 - 5.3 mmol/L    Chloride 103 96 - 108 mmol/L    CO2 24 21 - 32 mmol/L    ANION GAP 11 4 - 13 mmol/L    BUN 13 5 - 25 mg/dL    Creatinine 0.79 0.60 - 1.30 mg/dL    Glucose 151 (H) 65 - 140 mg/dL    Calcium 9.5 8.4 - 10.2 mg/dL    AST 23 13 - 39 U/L    ALT 23 7 - 52 U/L    Alkaline Phosphatase 44 34 - 104 U/L    Total Protein 7.2 6.4 - 8.4 g/dL    Albumin 4.3 3.5 - 5.0 g/dL    Total Bilirubin 1.01 (H) 0.20 - 1.00 mg/dL    eGFR 78 ml/min/1.73sq m   Lipase    Collection Time: 05/07/24  6:47 AM   Result Value Ref  "Range    Lipase 16 11 - 82 u/L   HS Troponin 0hr (reflex protocol)    Collection Time: 05/07/24  6:47 AM   Result Value Ref Range    hs TnI 0hr 8 \"Refer to ACS Flowchart\"- see link ng/L   UA (URINE) with reflex to Scope    Collection Time: 05/07/24  9:50 AM   Result Value Ref Range    Color, UA Light Yellow     Clarity, UA Clear     Specific Gravity, UA >=1.050 (H) 1.003 - 1.030    pH, UA 7.5 4.5, 5.0, 5.5, 6.0, 6.5, 7.0, 7.5, 8.0    Leukocytes, UA Negative Negative    Nitrite, UA Negative Negative    Protein, UA 30 (1+) (A) Negative mg/dl    Glucose, UA Negative Negative mg/dl    Ketones, UA Negative Negative mg/dl    Urobilinogen, UA <2.0 <2.0 mg/dl mg/dl    Bilirubin, UA Negative Negative    Occult Blood, UA Negative Negative   Urine Microscopic    Collection Time: 05/07/24  9:50 AM   Result Value Ref Range    RBC, UA 1-2 None Seen, 1-2 /hpf    WBC, UA 1-2 None Seen, 1-2 /hpf    Epithelial Cells Occasional None Seen, Occasional /hpf    Bacteria, UA None Seen None Seen, Occasional /hpf   HS Troponin I 2hr    Collection Time: 05/07/24 10:04 AM   Result Value Ref Range    hs TnI 2hr 6 \"Refer to ACS Flowchart\"- see link ng/L    Delta 2hr hsTnI -2 <20 ng/L       Imaging: I have personally reviewed pertinent reports.   and I have personally reviewed pertinent films in PACS  Code Status: Level 1 full code  [facility]/ [facility] Everywhere records reviewed: Yes    * Please Note: Fluency DirectDictation voice to text software may have been used in the creation of this document. **  "

## 2024-05-07 NOTE — ED ATTENDING ATTESTATION
5/7/2024  I, Li Doyle DO, saw and evaluated the patient. I have discussed the patient with the resident/non-physician practitioner and agree with the resident's/non-physician practitioner's findings, Plan of Care, and MDM as documented in the resident's/non-physician practitioner's note, except where noted. All available labs and Radiology studies were reviewed.  I was present for key portions of any procedure(s) performed by the resident/non-physician practitioner and I was immediately available to provide assistance.       At this point I agree with the current assessment done in the Emergency Department.  I have conducted an independent evaluation of this patient a history and physical is as follows:    History  Patient is a 64 y.o. year old female with a past medical history of DM, CHF, CAD, and HLD who presents for evaluation with abdominal pain.  She states that pain began last night.  Pain is mostly located in the lower abdomen.  She endorses associated nausea with several episodes of emesis.  She states that she did have a few episodes with small streaks of blood in her emesis.  She was concerned because she is on Eliquis and she does have a history of a GI bleed requiring blood transfusions.  She denies any known fevers or chills, chest pain, shortness of breath, back pain, or other concerning symptoms.  No known sick contacts.    Current Outpatient Medications   Medication Instructions    acetaminophen (TYLENOL) 650 mg, Oral, Every 8 hours PRN    albuterol (ProAir HFA) 90 mcg/act inhaler 2 puffs, Inhalation, Every 6 hours PRN    amiodarone 200 mg, Oral, Daily    apixaban (ELIQUIS) 5 mg, Oral, 2 times daily    atorvastatin (LIPITOR) 80 mg, Oral, Every evening    clopidogrel (PLAVIX) 75 mg, Oral, Daily    Empagliflozin (JARDIANCE) 25 mg, Oral, Every morning    escitalopram (LEXAPRO) 10 mg, Oral, Daily    furosemide (LASIX) 20 mg, Oral, As needed    gabapentin (NEURONTIN) 100 mg, Oral, 2 times daily     levothyroxine (SYNTHROID) 50 mcg, Oral, Daily    meclizine (ANTIVERT) 25 mg, Oral, Every 8 hours PRN    metoprolol succinate (TOPROL-XL) 25 mg, Oral, Daily at bedtime    Multiple Vitamin (MULTIVITAMIN) capsule 1 capsule, Oral, Daily    pantoprazole (PROTONIX) 40 mg, Oral, 2 times daily before meals    sacubitril-valsartan (Entresto) 24-26 MG TABS 1 tablet, Oral, 2 times daily    sucralfate (CARAFATE) 1 g, Oral, 4 times daily (before meals and at bedtime)     Past Medical History:   Diagnosis Date    Acute respiratory insufficiency 03/22/2024    Allergic     Chronic HFrEF (heart failure with reduced ejection fraction) ([facility])     Coronary artery disease     COVID-19 virus infection 11/28/2022    Diabetes mellitus ([facility])     Disease of thyroid gland 4/09/2024    Hyperlipidemia     Hypoglycemia     ICD (implantable cardioverter-defibrillator) in place     Ischemic cardiomyopathy     Lactic acidosis 03/22/2024    Myocardial infarction ([facility]) 3/22/2024    Otitis media 1966    ST elevation myocardial infarction involving left anterior descending (LAD) coronary artery ([facility]) 03/22/2024    Tobacco abuse     Visual impairment 1967     Past Surgical History:   Procedure Laterality Date    ADENOIDECTOMY      CARDIAC CATHETERIZATION N/A 03/22/2024    Procedure: Cardiac pci;  Surgeon: Gabriel Myers MD;  Location: [facility];  Service: Cardiology    CARDIAC CATHETERIZATION N/A 03/22/2024    Procedure: Cardiac Coronary Angiogram;  Surgeon: Gabriel Myers MD;  Location: [facility];  Service: Cardiology    CARDIAC CATHETERIZATION N/A 03/22/2024    Procedure: Cardiac PCI AMI;  Surgeon: Gabriel Myers MD;  [facility]: [facility];  Service: Cardiology    CARDIAC CATHETERIZATION N/A 03/22/2024    Procedure: Cardiac IVUS;  Surgeon: Gabriel Myers MD;  [facility]: [facility];  Service: Cardiology    CARDIAC ELECTROPHYSIOLOGY PROCEDURE N/A 03/27/2024    Procedure: Cardiac icd implant;  Surgeon: Jeffy Lama MD;   Location: [facility];  Service: Cardiology     SECTION      ECTOPIC PREGNANCY SURGERY      SEPTOPLASTY      SPINE SURGERY  23    TONSILLECTOMY         Objective  Vitals:    24 1130 24 1145 24 1245 24 1500   BP: 109/59   112/58   BP Location: Right arm   Right arm   Pulse: 93 102 88 88   Resp: 18 18 18 17   Temp:       TempSrc:       SpO2: (!) 88% (!) 86% 91% 91%   Weight:       Height:           General: VSS, awake, alert. Uncomfortable-appearing.    Head: Normocephalic, atraumatic.  Eyes: PERRL, EOM-I.   ENT: Atraumatic external nose and ears.  MMM. No stridor.   Neck: Symmetric, supple, trachea midline.  CV: RRR. +S1/S2. No murmurs. Peripheral pulses +2 throughout.   Lungs: Respirations unlabored, no tachypnea. CTAB, lungs sounds equal bilateral.   Abd: soft, ND. Generalized tenderness, worse in the RLQ, with voluntary guarding.   MSK: Extremities without tenderness or gross deformity. No lower extremity edema.   Skin: Dry, intact. No lesions.  Neuro: AAOx3, GCS 15, CN II-XII grossly intact. Motor grossly intact. Sensory grossly intact.  Psychiatric/Behavioral: Appropriate mood and affect. Behavior normal.    Results Reviewed       Procedure Component Value Units Date/Time    APTT [[id number]]  (Normal) Collected: 24 1247    Lab Status: Final result Specimen: Blood from Arm, Right Updated: 24 1336     PTT 27 seconds     Protime-INR [[id number]]  (Abnormal) Collected: 24 1247    Lab Status: Final result Specimen: Blood from Arm, Right Updated: 24 1336     Protime 14.6 seconds      INR 1.08    HS Troponin I 2hr [[id number]]  (Normal) Collected: 24 1004    Lab Status: Final result Specimen: Blood from Arm, Right Updated: 24 1042     hs TnI 2hr 6 ng/L      Delta 2hr hsTnI -2 ng/L     Urine Microscopic [[id number]]  (Normal) Collected: 24 0950    Lab Status: Final result Specimen: Urine, Clean Catch Updated: 24 1010     RBC, UA  1-2 /hpf      WBC, UA 1-2 /hpf      Epithelial Cells Occasional /hpf      Bacteria, UA None Seen /hpf     UA (URINE) with reflex to Scope [[id number]]  (Abnormal) Collected: 05/07/24 0950    Lab Status: Final result Specimen: Urine, Clean Catch Updated: 05/07/24 1007     Color, UA Light Yellow     Clarity, UA Clear     Specific Gravity, UA >=1.050     pH, UA 7.5     Leukocytes, UA Negative     Nitrite, UA Negative     Protein, UA 30 (1+) mg/dl      Glucose, UA Negative mg/dl      Ketones, UA Negative mg/dl      Urobilinogen, UA <2.0 mg/dl      Bilirubin, UA Negative     Occult Blood, UA Negative    Comprehensive metabolic panel [[id number]]  (Abnormal) Collected: 05/07/24 0647    Lab Status: Final result Specimen: Blood from Arm, Left Updated: 05/07/24 0740     Sodium 138 mmol/L      Potassium 3.9 mmol/L      Chloride 103 mmol/L      CO2 24 mmol/L      ANION GAP 11 mmol/L      BUN 13 mg/dL      Creatinine 0.79 mg/dL      Glucose 151 mg/dL      Calcium 9.5 mg/dL      AST 23 U/L      ALT 23 U/L      Alkaline Phosphatase 44 U/L      Total Protein 7.2 g/dL      Albumin 4.3 g/dL      Total Bilirubin 1.01 mg/dL      eGFR 78 ml/min/1.73sq m     Narrative:      [employer] guidelines for Chronic Kidney Disease (CKD):     Stage 1 with normal or high GFR (GFR > 90 mL/min/1.73 square meters)    Stage 2 Mild CKD (GFR = 60-89 mL/min/1.73 square meters)    Stage 3A Moderate CKD (GFR = 45-59 mL/min/1.73 square meters)    Stage 3B Moderate CKD (GFR = 30-44 mL/min/1.73 square meters)    Stage 4 Severe CKD (GFR = 15-29 mL/min/1.73 square meters)    Stage 5 End Stage CKD (GFR <15 mL/min/1.73 square meters)  Note: GFR calculation is accurate only with a steady state creatinine    Lipase [[id number]]  (Normal) Collected: 05/07/24 0647    Lab Status: Final result Specimen: Blood from Arm, Left Updated: 05/07/24 0740     Lipase 16 u/L     HS Troponin 0hr (reflex protocol) [[id number]]  (Normal) Collected: 05/07/24 0647     Lab Status: Final result Specimen: Blood from Arm, Left Updated: 05/07/24 0735     hs TnI 0hr 8 ng/L     CBC and differential [[id number]]  (Abnormal) Collected: 05/07/24 0647    Lab Status: Final result Specimen: Blood from Arm, Left Updated: 05/07/24 0712     WBC 16.82 Thousand/uL      RBC 3.99 Million/uL      Hemoglobin 11.7 g/dL      Hematocrit 36.7 %      MCV 92 fL      MCH 29.3 pg      MCHC 31.9 g/dL      RDW 14.3 %      MPV 10.1 fL      Platelets 305 Thousands/uL      nRBC 0 /100 WBCs      Segmented % 82 %      Immature Grans % 0 %      Lymphocytes % 9 %      Monocytes % 7 %      Eosinophils Relative 2 %      Basophils Relative 0 %      Absolute Neutrophils 13.65 Thousands/µL      Absolute Immature Grans 0.06 Thousand/uL      Absolute Lymphocytes 1.57 Thousands/µL      Absolute Monocytes 1.22 Thousand/µL      Eosinophils Absolute 0.27 Thousand/µL      Basophils Absolute 0.05 Thousands/µL           CTA ED chest PE study   Final Result by Lindy Ghosh MD (05/07 0934)      Single subsegmental right lower lobe pulmonary embolus as shown on abdominal CT.      RV/LV diameter ratio less than 1 with nothing to indicate right heart strain.      Question mild interstitial edema.            Workstation performed: [id number]         CT abdomen pelvis with contrast   Final Result by Jason Dominguez MD (05/07 0829)         1. Acute appendicitis.   2. Partial visualization of suspected pulmonary embolus. Consider dedicated chest CTA to further evaluate.         I personally discussed this study with LEANNE LEWIS on 5/7/2024 8:28 AM.               Workstation performed: [id number]          VAS VENOUS DUPLEX - LOWER LIMB BILATERAL    (Results Pending)     Medications   piperacillin-tazobactam (ZOSYN) IVPB 4.5 g (0 g Intravenous Stopped 5/7/24 1034)     Followed by   piperacillin-tazobactam (ZOSYN) IVPB (EXTENDED INFUSION) 4.5 g (has no administration in time range)   lactated ringers infusion (125 mL/hr  Intravenous New Bag 5/7/24 1043)   acetaminophen (TYLENOL) tablet 650 mg (has no administration in time range)   oxyCODONE (ROXICODONE) immediate release tablet 10 mg (has no administration in time range)   oxyCODONE (ROXICODONE) IR tablet 5 mg (has no administration in time range)   HYDROmorphone (DILAUDID) injection 0.5 mg (has no administration in time range)   pantoprazole (PROTONIX) injection 40 mg (40 mg Intravenous Given 5/7/24 1037)   trimethobenzamide (TIGAN) IM injection 200 mg (has no administration in time range)   dextrose 5 % and sodium chloride 0.45 % with KCl 20 mEq/L infusion (has no administration in time range)   heparin (porcine) 25,000 units in 0.45% NaCl 250 mL infusion (premix) (18 Units/kg/hr × 90 kg (Order-Specific) Intravenous Continued from OR 5/7/24 1422)   heparin (porcine) injection 7,200 Units (has no administration in time range)   heparin (porcine) injection 3,600 Units (has no administration in time range)   clopidogrel (PLAVIX) tablet 75 mg (has no administration in time range)   amiodarone tablet 200 mg (has no administration in time range)   metoprolol tartrate (LOPRESSOR) tablet 12.5 mg (has no administration in time range)   ipratropium-albuterol (DUO-NEB) 0.5-2.5 mg/3 mL inhalation solution 3 mL (3 mL Nebulization Given 5/7/24 1526)   albuterol (PROVENTIL HFA,VENTOLIN HFA) inhaler 2 puff (has no administration in time range)   ondansetron (ZOFRAN) injection 4 mg (4 mg Intravenous Given 5/7/24 0647)   acetaminophen (Ofirmev) injection 1,000 mg (0 mg Intravenous Stopped 5/7/24 0732)   iohexol (OMNIPAQUE) 350 MG/ML injection (MULTI-DOSE) 100 mL (100 mL Intravenous Given 5/7/24 0802)   ceftriaxone (ROCEPHIN) 1 g/50 mL in dextrose IVPB (0 mg Intravenous Stopped 5/7/24 0909)   iohexol (OMNIPAQUE) 350 MG/ML injection (SINGLE-DOSE) 60 mL (60 mL Intravenous Given 5/7/24 0907)   fentaNYL injection 50 mcg (50 mcg Intravenous Given 5/7/24 0953)   fentaNYL injection 50 mcg (50 mcg  Intravenous Given 5/7/24 1037)   ondansetron (ZOFRAN) injection 4 mg (4 mg Intravenous Given 5/7/24 1135)   heparin (porcine) injection 7,200 Units (7,200 Units Intravenous Given 5/7/24 1418)     ED Course as of 05/07/24 1529   Tue May 07, 2024   0721 WBC(!): 16.82       MDM  64-year-old female presents for evaluation with abdominal pain.  On exam, patient with normal vitals, in no acute distress, although uncomfortable appearing.  Patient's abdomen is soft, nondistended, with generalized tenderness worse in the right lower quadrant with voluntary guarding. Differential diagnosis includes, but is not limited to, gastroenteritis, appendicitis, UTI, diverticulitis, ovarian torsion, or other acute intraabdominal process.  Patient evaluated with CBC, CMP, UA, and CT scan of the abdomen and pelvis.  IV Tylenol and Zofran given for symptomatic treatment.    Patient's labs notable for a leukocytosis with a white blood cell count of 16.8.  Otherwise unremarkable.  CT scan shows an acute appendicitis, as well as concerns for a possible PE in the right lung.  CTA PE scan was ordered for further evaluation, and showed a single subsegmental pulmonary embolism without signs of right heart strain.  Patient was admitted to general surgery service for treatment of her acute appendicitis, as well as for further management and evaluation of her pulmonary embolism.    Final Diagnosis:  1. Acute appendicitis with localized peritonitis, without perforation, abscess, or gangrene    2. Atrial flutter, paroxysmal ([facility])    3. Acute pulmonary embolism, unspecified pulmonary embolism type, unspecified whether acute cor pulmonale present ([facility])    4. Acute appendicitis, unspecified acute appendicitis type    5. PE (pulmonary thromboembolism) ([facility])      Critical Care Time  Procedures

## 2024-05-07 NOTE — ED NOTES
IV access lost. Difficult stick. Providers bedside. Will attempt additional access after provider demi Simons RN  05/07/24 [id number]

## 2024-05-07 NOTE — PROGRESS NOTES
"Progress Note - General Surgery   Vesna Langston 64 y.o. female MRN: [id number]  Unit/Bed#: ED-18 Encounter: [id number]        Subjective/Objective     Subjective: Abdominal pain/acute appendicitis    64-year-old female who experienced an MI in March of this year during cardiac rotation placement of a single drug-eluting stent.  Last echo revealed an ejection fraction of 35%.  She is also on Eliquis for history of atrial fibrillation.    Presents to the emergency room with onset of pain last evening.  Had some nausea or vomiting.  This morning complain of continued right lower quad pain.  No nausea or vomiting.    CT scan done in the emergency room revealed changes consistent with acute appendicitus.  There is no signs of an appendicolith.  There was a questionable subsegmental PE noted prompting a CT of the chest with PE protocol.  Last dose of Eliquis was yesterday.    Blood pressure 110/52, pulse 78, temperature 98.2 °F (36.8 °C), temperature source Oral, resp. rate 18, height 5' 8\" (1.727 m), weight 94 kg (207 lb 3.7 oz), SpO2 95%.,Body mass index is 31.51 kg/m².      Intake/Output Summary (Last 24 hours) at 5/7/2024 1050  Last data filed at 5/7/2024 1034  Gross per 24 hour   Intake 550 ml   Output --   Net 550 ml           Physical Exam:   On exam she is conversive and relatively comfortable.    Abdomen is round and soft with right lower quadrant tenderness.  There is no overt peritonitis noted.  Mild Rovsing sign noted.      Assessment:  Acute appendicitis in the face of a recent MI status post placement of a drug-eluting stent on Plavix  Ablation on anticoagulation/Eliquis.  Last dose was yesterday  Ischemic cardiomyopathy  New onset diabetes    Plan:  Vital signs are stable.  Will ask cardiology to see her regarding her cardiac status.  Will ask pulmonary to see her regarding this subsegmental PE.  Given increased risk given her recent cardiac events, would attempt to treat her appendicitis with antibiotics.  " Will hold her Eliquis at this point.  Should have a change in her exam or vital signs, would proceed to the operating room.  This was discussed at length with the patient and she agrees.    Billy Colin,   5/7/2024  10:50 AM

## 2024-05-08 ENCOUNTER — ANESTHESIA (INPATIENT)
Dept: PERIOP | Facility: HOSPITAL | Age: 64
DRG: 341 | End: 2024-05-08
Payer: COMMERCIAL

## 2024-05-08 PROBLEM — K35.31 ACUTE APPENDICITIS WITH LOCALIZED PERITONITIS AND GANGRENE, WITHOUT PERFORATION OR ABSCESS: Status: ACTIVE | Noted: 2024-05-08

## 2024-05-08 LAB
ABO GROUP BLD: NORMAL
ANION GAP SERPL CALCULATED.3IONS-SCNC: 8 MMOL/L (ref 4–13)
APTT PPP: 28 SECONDS (ref 23–37)
APTT PPP: 65 SECONDS (ref 23–37)
ATRIAL RATE: 71 BPM
BASOPHILS # BLD AUTO: 0.12 THOUSANDS/ÂΜL (ref 0–0.1)
BASOPHILS NFR BLD AUTO: 1 % (ref 0–1)
BLD GP AB SCN SERPL QL: NEGATIVE
BUN SERPL-MCNC: 10 MG/DL (ref 5–25)
CALCIUM SERPL-MCNC: 8.7 MG/DL (ref 8.4–10.2)
CHLORIDE SERPL-SCNC: 99 MMOL/L (ref 96–108)
CO2 SERPL-SCNC: 25 MMOL/L (ref 21–32)
CREAT SERPL-MCNC: 0.79 MG/DL (ref 0.6–1.3)
EOSINOPHIL # BLD AUTO: 0.04 THOUSAND/ÂΜL (ref 0–0.61)
EOSINOPHIL NFR BLD AUTO: 0 % (ref 0–6)
ERYTHROCYTE [DISTWIDTH] IN BLOOD BY AUTOMATED COUNT: 14.9 % (ref 11.6–15.1)
GFR SERPL CREATININE-BSD FRML MDRD: 78 ML/MIN/1.73SQ M
GLUCOSE SERPL-MCNC: 116 MG/DL (ref 65–140)
GLUCOSE SERPL-MCNC: 136 MG/DL (ref 65–140)
GLUCOSE SERPL-MCNC: 137 MG/DL (ref 65–140)
GLUCOSE SERPL-MCNC: 140 MG/DL (ref 65–140)
GLUCOSE SERPL-MCNC: 174 MG/DL (ref 65–140)
GLUCOSE SERPL-MCNC: 204 MG/DL (ref 65–140)
HCT VFR BLD AUTO: 32.3 % (ref 34.8–46.1)
HGB BLD-MCNC: 10 G/DL (ref 11.5–15.4)
IMM GRANULOCYTES # BLD AUTO: 0.25 THOUSAND/UL (ref 0–0.2)
IMM GRANULOCYTES NFR BLD AUTO: 1 % (ref 0–2)
LYMPHOCYTES # BLD AUTO: 2.29 THOUSANDS/ÂΜL (ref 0.6–4.47)
LYMPHOCYTES NFR BLD AUTO: 9 % (ref 14–44)
MAGNESIUM SERPL-MCNC: 1.7 MG/DL (ref 1.9–2.7)
MCH RBC QN AUTO: 28.4 PG (ref 26.8–34.3)
MCHC RBC AUTO-ENTMCNC: 31 G/DL (ref 31.4–37.4)
MCV RBC AUTO: 92 FL (ref 82–98)
MONOCYTES # BLD AUTO: 2.32 THOUSAND/ÂΜL (ref 0.17–1.22)
MONOCYTES NFR BLD AUTO: 9 % (ref 4–12)
NEUTROPHILS # BLD AUTO: 20.35 THOUSANDS/ÂΜL (ref 1.85–7.62)
NEUTS SEG NFR BLD AUTO: 80 % (ref 43–75)
NRBC BLD AUTO-RTO: 0 /100 WBCS
P AXIS: 62 DEGREES
PHOSPHATE SERPL-MCNC: 2.9 MG/DL (ref 2.3–4.1)
PLATELET # BLD AUTO: 260 THOUSANDS/UL (ref 149–390)
PMV BLD AUTO: 9.7 FL (ref 8.9–12.7)
POTASSIUM SERPL-SCNC: 3.6 MMOL/L (ref 3.5–5.3)
PR INTERVAL: 164 MS
QRS AXIS: 53 DEGREES
QRSD INTERVAL: 80 MS
QT INTERVAL: 478 MS
QTC INTERVAL: 519 MS
RBC # BLD AUTO: 3.52 MILLION/UL (ref 3.81–5.12)
RH BLD: POSITIVE
SODIUM SERPL-SCNC: 132 MMOL/L (ref 135–147)
SPECIMEN EXPIRATION DATE: NORMAL
T WAVE AXIS: 88 DEGREES
VENTRICULAR RATE: 71 BPM
WBC # BLD AUTO: 25.37 THOUSAND/UL (ref 4.31–10.16)

## 2024-05-08 PROCEDURE — 85730 THROMBOPLASTIN TIME PARTIAL: CPT | Performed by: STUDENT IN AN ORGANIZED HEALTH CARE EDUCATION/TRAINING PROGRAM

## 2024-05-08 PROCEDURE — 88304 TISSUE EXAM BY PATHOLOGIST: CPT | Performed by: PATHOLOGY

## 2024-05-08 PROCEDURE — 99232 SBSQ HOSP IP/OBS MODERATE 35: CPT | Performed by: INTERNAL MEDICINE

## 2024-05-08 PROCEDURE — 0DTJ4ZZ RESECTION OF APPENDIX, PERCUTANEOUS ENDOSCOPIC APPROACH: ICD-10-PCS | Performed by: STUDENT IN AN ORGANIZED HEALTH CARE EDUCATION/TRAINING PROGRAM

## 2024-05-08 PROCEDURE — 99233 SBSQ HOSP IP/OBS HIGH 50: CPT | Performed by: STUDENT IN AN ORGANIZED HEALTH CARE EDUCATION/TRAINING PROGRAM

## 2024-05-08 PROCEDURE — 93010 ELECTROCARDIOGRAM REPORT: CPT | Performed by: INTERNAL MEDICINE

## 2024-05-08 PROCEDURE — 86901 BLOOD TYPING SEROLOGIC RH(D): CPT | Performed by: SURGERY

## 2024-05-08 PROCEDURE — 83735 ASSAY OF MAGNESIUM: CPT | Performed by: SURGERY

## 2024-05-08 PROCEDURE — C9113 INJ PANTOPRAZOLE SODIUM, VIA: HCPCS | Performed by: SURGERY

## 2024-05-08 PROCEDURE — 80048 BASIC METABOLIC PNL TOTAL CA: CPT | Performed by: SURGERY

## 2024-05-08 PROCEDURE — 85730 THROMBOPLASTIN TIME PARTIAL: CPT | Performed by: SURGERY

## 2024-05-08 PROCEDURE — 44970 LAPAROSCOPY APPENDECTOMY: CPT | Performed by: STUDENT IN AN ORGANIZED HEALTH CARE EDUCATION/TRAINING PROGRAM

## 2024-05-08 PROCEDURE — 85025 COMPLETE CBC W/AUTO DIFF WBC: CPT | Performed by: SURGERY

## 2024-05-08 PROCEDURE — 86850 RBC ANTIBODY SCREEN: CPT | Performed by: SURGERY

## 2024-05-08 PROCEDURE — 84100 ASSAY OF PHOSPHORUS: CPT | Performed by: SURGERY

## 2024-05-08 PROCEDURE — 99232 SBSQ HOSP IP/OBS MODERATE 35: CPT | Performed by: STUDENT IN AN ORGANIZED HEALTH CARE EDUCATION/TRAINING PROGRAM

## 2024-05-08 PROCEDURE — 86900 BLOOD TYPING SEROLOGIC ABO: CPT | Performed by: SURGERY

## 2024-05-08 PROCEDURE — 82948 REAGENT STRIP/BLOOD GLUCOSE: CPT

## 2024-05-08 RX ORDER — POTASSIUM CHLORIDE 14.9 MG/ML
20 INJECTION INTRAVENOUS
Qty: 200 ML | Refills: 0 | Status: COMPLETED | OUTPATIENT
Start: 2024-05-08 | End: 2024-05-08

## 2024-05-08 RX ORDER — DEXAMETHASONE SODIUM PHOSPHATE 10 MG/ML
INJECTION, SOLUTION INTRAMUSCULAR; INTRAVENOUS AS NEEDED
Status: DISCONTINUED | OUTPATIENT
Start: 2024-05-08 | End: 2024-05-08

## 2024-05-08 RX ORDER — LIDOCAINE HYDROCHLORIDE 10 MG/ML
INJECTION, SOLUTION EPIDURAL; INFILTRATION; INTRACAUDAL; PERINEURAL AS NEEDED
Status: DISCONTINUED | OUTPATIENT
Start: 2024-05-08 | End: 2024-05-08 | Stop reason: HOSPADM

## 2024-05-08 RX ORDER — ALBUMIN, HUMAN INJ 5% 5 %
SOLUTION INTRAVENOUS CONTINUOUS PRN
Status: DISCONTINUED | OUTPATIENT
Start: 2024-05-08 | End: 2024-05-08

## 2024-05-08 RX ORDER — HEPARIN SODIUM 10000 [USP'U]/100ML
3-20 INJECTION, SOLUTION INTRAVENOUS
Status: DISCONTINUED | OUTPATIENT
Start: 2024-05-08 | End: 2024-05-12

## 2024-05-08 RX ORDER — FENTANYL CITRATE/PF 50 MCG/ML
50 SYRINGE (ML) INJECTION
Status: DISCONTINUED | OUTPATIENT
Start: 2024-05-08 | End: 2024-05-08 | Stop reason: HOSPADM

## 2024-05-08 RX ORDER — MIDAZOLAM HYDROCHLORIDE 2 MG/2ML
INJECTION, SOLUTION INTRAMUSCULAR; INTRAVENOUS AS NEEDED
Status: DISCONTINUED | OUTPATIENT
Start: 2024-05-08 | End: 2024-05-08

## 2024-05-08 RX ORDER — SODIUM CHLORIDE, SODIUM LACTATE, POTASSIUM CHLORIDE, CALCIUM CHLORIDE 600; 310; 30; 20 MG/100ML; MG/100ML; MG/100ML; MG/100ML
INJECTION, SOLUTION INTRAVENOUS CONTINUOUS PRN
Status: DISCONTINUED | OUTPATIENT
Start: 2024-05-08 | End: 2024-05-08

## 2024-05-08 RX ORDER — MAGNESIUM HYDROXIDE 1200 MG/15ML
LIQUID ORAL AS NEEDED
Status: DISCONTINUED | OUTPATIENT
Start: 2024-05-08 | End: 2024-05-08 | Stop reason: HOSPADM

## 2024-05-08 RX ORDER — ROCURONIUM BROMIDE 10 MG/ML
INJECTION, SOLUTION INTRAVENOUS AS NEEDED
Status: DISCONTINUED | OUTPATIENT
Start: 2024-05-08 | End: 2024-05-08

## 2024-05-08 RX ORDER — POLYETHYLENE GLYCOL 3350 17 G/17G
17 POWDER, FOR SOLUTION ORAL DAILY
Status: DISCONTINUED | OUTPATIENT
Start: 2024-05-09 | End: 2024-05-10

## 2024-05-08 RX ORDER — ESMOLOL HYDROCHLORIDE 10 MG/ML
INJECTION INTRAVENOUS AS NEEDED
Status: DISCONTINUED | OUTPATIENT
Start: 2024-05-08 | End: 2024-05-08

## 2024-05-08 RX ORDER — CEFAZOLIN SODIUM 1 G/3ML
INJECTION, POWDER, FOR SOLUTION INTRAMUSCULAR; INTRAVENOUS AS NEEDED
Status: DISCONTINUED | OUTPATIENT
Start: 2024-05-08 | End: 2024-05-08

## 2024-05-08 RX ORDER — ONDANSETRON 2 MG/ML
4 INJECTION INTRAMUSCULAR; INTRAVENOUS ONCE AS NEEDED
Status: DISCONTINUED | OUTPATIENT
Start: 2024-05-08 | End: 2024-05-08 | Stop reason: HOSPADM

## 2024-05-08 RX ORDER — HEPARIN SODIUM 10000 [USP'U]/100ML
3-20 INJECTION, SOLUTION INTRAVENOUS
Status: DISCONTINUED | OUTPATIENT
Start: 2024-05-08 | End: 2024-05-08

## 2024-05-08 RX ORDER — LIDOCAINE HCL/PF 100 MG/5ML
SYRINGE (ML) INJECTION AS NEEDED
Status: DISCONTINUED | OUTPATIENT
Start: 2024-05-08 | End: 2024-05-08

## 2024-05-08 RX ORDER — SODIUM CHLORIDE, SODIUM LACTATE, POTASSIUM CHLORIDE, CALCIUM CHLORIDE 600; 310; 30; 20 MG/100ML; MG/100ML; MG/100ML; MG/100ML
75 INJECTION, SOLUTION INTRAVENOUS CONTINUOUS
Status: DISCONTINUED | OUTPATIENT
Start: 2024-05-08 | End: 2024-05-09

## 2024-05-08 RX ORDER — FENTANYL CITRATE 50 UG/ML
INJECTION, SOLUTION INTRAMUSCULAR; INTRAVENOUS AS NEEDED
Status: DISCONTINUED | OUTPATIENT
Start: 2024-05-08 | End: 2024-05-08

## 2024-05-08 RX ORDER — ONDANSETRON 2 MG/ML
INJECTION INTRAMUSCULAR; INTRAVENOUS AS NEEDED
Status: DISCONTINUED | OUTPATIENT
Start: 2024-05-08 | End: 2024-05-08

## 2024-05-08 RX ORDER — MAGNESIUM SULFATE HEPTAHYDRATE 40 MG/ML
2 INJECTION, SOLUTION INTRAVENOUS ONCE
Status: COMPLETED | OUTPATIENT
Start: 2024-05-08 | End: 2024-05-08

## 2024-05-08 RX ADMIN — INSULIN LISPRO 2 UNITS: 100 INJECTION, SOLUTION INTRAVENOUS; SUBCUTANEOUS at 17:24

## 2024-05-08 RX ADMIN — PANTOPRAZOLE SODIUM 40 MG: 40 INJECTION, POWDER, FOR SOLUTION INTRAVENOUS at 09:36

## 2024-05-08 RX ADMIN — SODIUM CHLORIDE, SODIUM LACTATE, POTASSIUM CHLORIDE, AND CALCIUM CHLORIDE: .6; .31; .03; .02 INJECTION, SOLUTION INTRAVENOUS at 13:17

## 2024-05-08 RX ADMIN — HEPARIN SODIUM 11.1 UNITS/KG/HR: 10000 INJECTION, SOLUTION INTRAVENOUS at 23:06

## 2024-05-08 RX ADMIN — SUGAMMADEX 200 MG: 100 INJECTION, SOLUTION INTRAVENOUS at 14:28

## 2024-05-08 RX ADMIN — PIPERACILLIN SODIUM AND TAZOBACTAM SODIUM 4.5 G: 36; 4.5 INJECTION, POWDER, LYOPHILIZED, FOR SOLUTION INTRAVENOUS at 06:47

## 2024-05-08 RX ADMIN — FENTANYL CITRATE 100 MCG: 50 INJECTION INTRAMUSCULAR; INTRAVENOUS at 13:23

## 2024-05-08 RX ADMIN — SODIUM CHLORIDE, SODIUM LACTATE, POTASSIUM CHLORIDE, AND CALCIUM CHLORIDE 75 ML/HR: .6; .31; .03; .02 INJECTION, SOLUTION INTRAVENOUS at 15:55

## 2024-05-08 RX ADMIN — NOREPINEPHRINE BITARTRATE 16 MCG: 1 INJECTION, SOLUTION, CONCENTRATE INTRAVENOUS at 13:35

## 2024-05-08 RX ADMIN — MAGNESIUM SULFATE HEPTAHYDRATE 2 G: 40 INJECTION, SOLUTION INTRAVENOUS at 09:35

## 2024-05-08 RX ADMIN — ESMOLOL HYDROCHLORIDE 20 MG: 10 INJECTION, SOLUTION INTRAVENOUS at 13:25

## 2024-05-08 RX ADMIN — ROCURONIUM BROMIDE 20 MG: 10 INJECTION, SOLUTION INTRAVENOUS at 13:39

## 2024-05-08 RX ADMIN — ATORVASTATIN CALCIUM 80 MG: 40 TABLET, FILM COATED ORAL at 17:23

## 2024-05-08 RX ADMIN — OXYCODONE HYDROCHLORIDE 10 MG: 10 TABLET ORAL at 06:07

## 2024-05-08 RX ADMIN — MIDAZOLAM 1 MG: 1 INJECTION INTRAMUSCULAR; INTRAVENOUS at 13:21

## 2024-05-08 RX ADMIN — DEXAMETHASONE SODIUM PHOSPHATE 10 MG: 10 INJECTION, SOLUTION INTRAMUSCULAR; INTRAVENOUS at 13:36

## 2024-05-08 RX ADMIN — NOREPINEPHRINE BITARTRATE 1 MCG/MIN: 1 INJECTION, SOLUTION, CONCENTRATE INTRAVENOUS at 13:28

## 2024-05-08 RX ADMIN — CEFAZOLIN 2000 MG: 1 INJECTION, POWDER, FOR SOLUTION INTRAMUSCULAR; INTRAVENOUS at 13:34

## 2024-05-08 RX ADMIN — POTASSIUM CHLORIDE 20 MEQ: 14.9 INJECTION, SOLUTION INTRAVENOUS at 09:36

## 2024-05-08 RX ADMIN — POTASSIUM CHLORIDE 20 MEQ: 14.9 INJECTION, SOLUTION INTRAVENOUS at 12:03

## 2024-05-08 RX ADMIN — PIPERACILLIN SODIUM AND TAZOBACTAM SODIUM 4.5 G: 36; 4.5 INJECTION, POWDER, LYOPHILIZED, FOR SOLUTION INTRAVENOUS at 22:36

## 2024-05-08 RX ADMIN — AMIODARONE HYDROCHLORIDE 200 MG: 200 TABLET ORAL at 19:34

## 2024-05-08 RX ADMIN — MIDAZOLAM 1 MG: 1 INJECTION INTRAMUSCULAR; INTRAVENOUS at 13:16

## 2024-05-08 RX ADMIN — ROCURONIUM BROMIDE 30 MG: 10 INJECTION, SOLUTION INTRAVENOUS at 13:23

## 2024-05-08 RX ADMIN — OXYCODONE HYDROCHLORIDE 10 MG: 10 TABLET ORAL at 17:37

## 2024-05-08 RX ADMIN — OXYCODONE HYDROCHLORIDE 10 MG: 10 TABLET ORAL at 01:52

## 2024-05-08 RX ADMIN — Medication 12.5 MG: at 22:37

## 2024-05-08 RX ADMIN — ALBUMIN (HUMAN): 12.5 INJECTION, SOLUTION INTRAVENOUS at 13:46

## 2024-05-08 RX ADMIN — OXYCODONE HYDROCHLORIDE 5 MG: 5 TABLET ORAL at 12:03

## 2024-05-08 RX ADMIN — DEXMEDETOMIDINE 0.15 MCG/KG/HR: 100 INJECTION, SOLUTION INTRAVENOUS at 13:28

## 2024-05-08 RX ADMIN — ONDANSETRON 4 MG: 2 INJECTION INTRAMUSCULAR; INTRAVENOUS at 13:36

## 2024-05-08 RX ADMIN — LIDOCAINE HYDROCHLORIDE 30 MG: 20 INJECTION INTRAVENOUS at 13:23

## 2024-05-08 NOTE — ANESTHESIA POSTPROCEDURE EVALUATION
Post-Op Assessment Note    CV Status:  Stable    Pain management: adequate       Mental Status:  Awake and sleepy   Hydration Status:  Euvolemic   PONV Controlled:  Controlled   Airway Patency:  Patent  Two or more mitigation strategies used for obstructive sleep apnea   Post Op Vitals Reviewed: Yes    No anethesia notable event occurred.    Staff: Anesthesiologist, CRNA               BP      Temp      Pulse     Resp      SpO2

## 2024-05-08 NOTE — UTILIZATION REVIEW
Initial Clinical Review    Admission: Date/Time/Statement:   Admission Orders (From admission, onward)       Ordered        05/07/24 1005  Inpatient Admission  Once                          Orders Placed This Encounter   Procedures    Inpatient Admission     Standing Status:   Standing     Number of Occurrences:   1     Order Specific Question:   Level of Care     Answer:   Med Surg [16]     Order Specific Question:   Estimated length of stay     Answer:   More than 2 Midnights     Order Specific Question:   Certification     Answer:   I certify that inpatient services are medically necessary for this patient for a duration of greater than two midnights. See H&P and MD Progress Notes for additional information about the patient's course of treatment.     ED Arrival Information       Expected   -    Arrival   5/7/2024 06:27    Acuity   Urgent              Means of arrival   Walk-In    Escorted by   Self    Service   Hospitalist    Admission type   Emergency              Arrival complaint   Cardiac Hx/Vomiting             Chief Complaint   Patient presents with    Abdominal Pain     Lower abdominal pain that radiates straight through to lower back since 2300 last night. Also report n/v. Denies CP or epigastric. Has SOB, reports h/o CHF and SOB mildly worse       Initial Presentation: 64 y.o. female who presented  to [facility] ED. Inpatient admission for evaluation and treatment of Acute appendicitis with localized peritonitis, without perforation, abscess, or gangrene. Diagnoses of Atrial flutter, paroxysmal , Acute pulmonary embolism, unspecified pulmonary embolism type, unspecified whether acute cor pulmonale present (HCC), Acute appendicitis, unspecified acute appendicitis type, and PE (pulmonary thromboembolism) ([facility]) were also pertinent to this visit.  PMHx:  has a past medical history of Acute respiratory insufficiency (03/22/2024), Allergic, Chronic HFrEF (heart failure with reduced ejection fraction)  , Coronary artery disease, COVID-19 virus infection (11/28/2022), Diabetes mellitus,  Disease of thyroid gland (4/09/2024), Hyperlipidemia, Hypoglycemia, ICD (implantable cardioverter-defibrillator) in place, Ischemic cardiomyopathy, Lactic acidosis (03/22/2024), Myocardial infarction  (3/22/2024), Otitis media (1966), ST elevation myocardial infarction involving left anterior descending (LAD) coronary artery  (03/22/2024), Tobacco abuse, and Visual impairment (1967). Morbid obesity ( BMI 31)   Presented w/ lower abdominal pain that started last night at 11 p/ She reports associated nausea and vomiting. She reports streaks of blood in her emesis this morning.   Incidental finding of PE on CT abdomen. She had hypoxia( 84% RA sat)  requiring supplemental O2.        Cardiology Consult- 5/7 -  She had a recent episode of VT s/p cardiac cath with PCI to the proximal LAD ( March 22,2024, ICD was implanted a few days later. New diagnosis ofd paroxysmal atrial flutter was notes  she is on Eliquis.  EF 35%. She has been titrating goal - directed heart failure therapy. Lasix prn. She now presents with acute appendicitis.  She is on antibiotics and is hoping to avoid surgery.  Eliquis on hold, but she is on heparin infusion. Due to a subsegmental PE seen on the CAT scan. NO evidence of right heart strain.  Do not favor a long hold of her antiplatelet agent. On hold, the dose this admit  with plan for surgery. Volume status appears stable. Hold Toprol XL, Jardiance. There is a plan to start Entresto. Monitor BP and we can add beta blocker back first.     Pulmonary Consult - 5/7 - Pt with low risk PE vrs acute intermediate low risk PE, CT chest with no RV strain. ECHO no RV strain, normal appearing RV. PE was an incidental finding. Her prior CT abdomen does not show the areas that have the RLL subsegemntal PE. This is either a new PE or resolving from prior. Favor anticoagulation, no indication for intervention. Currently on  heparin gtt, continue until cleared from a surgical perspective. Once cleared she can resume her5 mg daily Eliquis.  No tPA or thrombectomy indicated. Wean Oxygen, using for acut hypoxemic respiratory failure with mild interstial edema, she appears to be dry.     Date: 5/8/2024     Day 2:  Pt with significantly tender RLQ and moderately tender near umbillicus.  She reports she feels the same no improvement. Significant discomfort.   To consider OR today vrs to continue nonoperative management.  -  preference to OR  laparoscopic  vrs open appendectomy.  She is high risk due to her AC/AP therapies.   Continue NPO, Zosyn IV. Heparin gtt + plavix  for  PE on hold this am.  Continue IV antibiotics.   T max to 100 today.     OP report: 5/8 - 3:03 pm - Procedure: APPENDECTOMY LAPAROSCOPIC  Anesthesia Type:  General  Operative Findings:  Gangrenous mid to tip appendix with healthy base at cecum  Stapled appendectomy  Stapled mesoappendix with white load x 3          ED Triage Vitals   Temperature Pulse Respirations Blood Pressure SpO2   05/07/24 0643 05/07/24 0641 05/07/24 0641 05/07/24 0641 05/07/24 0641   98.2 °F (36.8 °C) 78 18 135/67 95 %      Temp Source Heart Rate Source Patient Position - Orthostatic VS BP Location FiO2 (%)   05/07/24 0643 05/07/24 0641 05/07/24 0641 05/07/24 0641 --   Oral Monitor Sitting Right arm       Pain Score       05/07/24 0641       8          Wt Readings from Last 1 Encounters:   05/08/24 94.3 kg (207 lb 14.3 oz)     Additional Vital Signs:  Date/Time Temp Pulse Resp BP MAP (mmHg) SpO2 Calculated FIO2 (%) - Nasal Cannula Nasal Cannula O2 Flow Rate (L/min) O2 Device Cardiac (WDL) Patient Position - Orthostatic VS   05/08/24 1500 -- 88 16 111/61 81 92 % 32 3 L/min Nasal cannula -- --   05/08/24 1445 -- 88 16 122/66 87 92 % 36 4 L/min Nasal cannula -- --   05/08/24 1436 99 °F (37.2 °C) 92 18 122/66 89 91 % -- -- None (Room air) WDL --      Date/Time Temp Pulse Resp BP MAP (mmHg) SpO2  Calculated FIO2 (%) - Nasal Cannula Nasal Cannula O2 Flow Rate (L/min) O2 Device Patient Position - Orthostatic VS   05/08/24 1228 100.7 °F (38.2 °C) Abnormal  84 19 119/56 -- 91 % -- -- None (Room air) --   05/08/24 0930 -- 80 -- 97/55 -- -- -- -- -- --     Date/Time Temp Pulse Resp BP MAP (mmHg) SpO2 Calculated FIO2 (%) - Nasal Cannula Nasal Cannula O2 Flow Rate (L/min) O2 Device Patient Position - Orthostatic VS   05/08/24 07:13:50 100 °F (37.8 °C) 85 16 94/54 67 91 % -- -- None (Room air) Lying   05/07/24 2110 -- 80 -- 114/60 81 94 % -- -- -- --   05/07/24 2101 99.2 °F (37.3 °C) 84 -- 125/60 83 96 % -- -- None (Room air) --   05/07/24 2058 -- 87 -- 125/60 -- -- -- -- -- --   05/07/24 2030 99.1 °F (37.3 °C) 84 18 115/61 -- 97 % -- -- None (Room air) Lying   05/07/24 1930 -- 81 18 108/56 75 98 % 28 2 L/min Nasal cannula Lying   05/07/24 1830 -- 84 17 90/55 67 97 % 28 2 L/min Nasal cannula Lying   05/07/24 1700 -- 89 17 96/51 68 94 % 28 2 L/min Nasal cannula Lying   05/07/24 1500 -- 88 17 112/58 80 91 % 28 2 L/min Nasal cannula Lying   05/07/24 1431 -- 88 -- 112/58 -- -- -- -- -- --   05/07/24 1245 -- 88 18 -- -- 91 % 28 2 L/min Nasal cannula Lying   05/07/24 1145 -- 102 18 -- -- 86 % Abnormal  40 5 L/min Nasal cannula --   05/07/24 1130 -- 93 18 109/59 77 88 % Abnormal  28 2 L/min Nasal cannula --   05/07/24 1100 -- 83 18 105/51 74 84 % Abnormal  -- -- None (Room air) Lying   05/07/24 1030 -- 78 -- 110/52 75 95 % -- -- -- --   05/07/24 0830 -- 74 -- 104/56 75 -- -- -- -- --   05/07/24 0730 -- 67 -- 124/64 89 92 % -- -- -- --   05/07/24 0643 98.2 °F (36.8 °C) -- -- -- -- -- -- -- -- --   05/07/24 0641 -- 78 18 135/67 -- 95 % -- -- None (Room air) Sitting           Pertinent Labs/Diagnostic Test Results:   CTA ED chest PE study   Final Result by Lindy Ghosh MD (05/07 0934)      Single subsegmental right lower lobe pulmonary embolus as shown on abdominal CT.      RV/LV diameter ratio less than 1 with nothing  to indicate right heart strain.      Question mild interstitial edema.            Workstation performed: [id number]         CT abdomen pelvis with contrast   Final Result by Jason Dominguez MD (05/07 0829)         1. Acute appendicitis.   2. Partial visualization of suspected pulmonary embolus. Consider dedicated chest CTA to further evaluate.         I personally discussed this study with LEANNE HERNANDESDOYLE LEWIS on 5/7/2024 8:28 AM.               Workstation performed: [id number]          VAS VENOUS DUPLEX - LOWER LIMB BILATERAL    5/7   Impression:  RIGHT LOWER LIMB:  No evidence of acute or chronic deep vein thrombosis.  No evidence of superficial thrombophlebitis noted.  Doppler evaluation shows a normal response to augmentation maneuvers..  Popliteal, posterior tibial and anterior tibial arterial Doppler waveform's are  triphasic.     LEFT LOWER LIMB:  No evidence of acute or chronic deep vein thrombosis.  No evidence of superficial thrombophlebitis noted.  Doppler evaluation shows a normal response to augmentation maneuvers.  Popliteal, posterior tibial and anterior tibial arterial Doppler waveform's are  triphasic/biphasic.           ECG - 5/7 -  Interpretation: normal      ECG rate:  71     ECG rate assessment: normal      Rhythm: sinus rhythm      Ectopy: none      QRS axis:  Normal     QRS intervals:  Normal     Conduction: normal      ST segments:  Normal     T waves: normal      Other findings: prolonged qTc interval       ECHO 5/7 - Summary     Left Ventricle: Left ventricular cavity size is normal. The left ventricular ejection fraction is 30%. Systolic function is severely reduced.    The following segments are akinetic: apical anterior, apical septal and apex.    The following segments are hypokinetic: mid anteroseptal, mid inferoseptal, apical inferior and apical lateral.    All other segments are normal.    Right Ventricle: Systolic function is normal. Wall motion is normal.    Right Atrium: The  atrium is normal in size.  Mobile density could represent thrombus versus Chiari network    Tricuspid Valve: There is mild regurgitation. The estimated right ventricular systolic pressure is 25.00 mmHg.      Results from last 7 days   Lab Units 05/08/24  0612 05/07/24  0647   WBC Thousand/uL 25.37* 16.82*   HEMOGLOBIN g/dL 10.0* 11.7   HEMATOCRIT % 32.3* 36.7   PLATELETS Thousands/uL 260 305   TOTAL NEUT ABS Thousands/µL 20.35* 13.65*         Results from last 7 days   Lab Units 05/08/24  0612 05/07/24  0647   SODIUM mmol/L 132* 138   POTASSIUM mmol/L 3.6 3.9   CHLORIDE mmol/L 99 103   CO2 mmol/L 25 24   ANION GAP mmol/L 8 11   BUN mg/dL 10 13   CREATININE mg/dL 0.79 0.79   EGFR ml/min/1.73sq m 78 78   CALCIUM mg/dL 8.7 9.5   MAGNESIUM mg/dL 1.7*  --    PHOSPHORUS mg/dL 2.9  --      Results from last 7 days   Lab Units 05/07/24  0647   AST U/L 23   ALT U/L 23   ALK PHOS U/L 44   TOTAL PROTEIN g/dL 7.2   ALBUMIN g/dL 4.3   TOTAL BILIRUBIN mg/dL 1.01*     Results from last 7 days   Lab Units 05/08/24  0716 05/07/24  1829 05/07/24  1619   POC GLUCOSE mg/dl 140 174* 235*     Results from last 7 days   Lab Units 05/08/24  0612 05/07/24  0647   GLUCOSE RANDOM mg/dL 137 151*       Results from last 7 days   Lab Units 05/07/24  1004 05/07/24  0647   HS TNI 0HR ng/L  --  8   HS TNI 2HR ng/L 6  --    HSTNI D2 ng/L -2  --          Results from last 7 days   Lab Units 05/07/24  2031 05/07/24  1247   PROTIME seconds  --  14.6*   INR   --  1.08   PTT seconds 141* 27       Results from last 7 days   Lab Units 05/07/24  0647   LIPASE u/L 16       Results from last 7 days   Lab Units 05/07/24  0950   CLARITY UA  Clear   COLOR UA  Light Yellow   SPEC GRAV UA  >=1.050*   PH UA  7.5   GLUCOSE UA mg/dl Negative   KETONES UA mg/dl Negative   BLOOD UA  Negative   PROTEIN UA mg/dl 30 (1+)*   NITRITE UA  Negative   BILIRUBIN UA  Negative   UROBILINOGEN UA (BE) mg/dl <2.0   LEUKOCYTES UA  Negative   WBC UA /hpf 1-2   RBC UA /hpf 1-2    BACTERIA UA /hpf None Seen   EPITHELIAL CELLS WET PREP /hpf Occasional         ED Treatment:   Medication Administration from 05/07/2024 0627 to 05/07/2024 2046         Date/Time Order Dose Route Action Comments     05/07/2024 0647 EDT ondansetron (ZOFRAN) injection 4 mg 4 mg Intravenous Given --     05/07/2024 0706 EDT sodium chloride 0.9 % bolus 1,000 mL 0 mL Intravenous Stopped --     05/07/2024 0650 EDT sodium chloride 0.9 % bolus 1,000 mL 1,000 mL Intravenous New Bag --     05/07/2024 0732 EDT acetaminophen (Ofirmev) injection 1,000 mg 0 mg Intravenous Stopped --     05/07/2024 0717 EDT acetaminophen (Ofirmev) injection 1,000 mg 1,000 mg Intravenous New Bag --     05/07/2024 0802 EDT iohexol (OMNIPAQUE) 350 MG/ML injection (MULTI-DOSE) 100 mL 100 mL Intravenous Given --     05/07/2024 0909 EDT ceftriaxone (ROCEPHIN) 1 g/50 mL in dextrose IVPB 0 mg Intravenous Stopped --     05/07/2024 0839 EDT ceftriaxone (ROCEPHIN) 1 g/50 mL in dextrose IVPB 1,000 mg Intravenous New Bag --     05/07/2024 0924 EDT metroNIDAZOLE (FLAGYL) IVPB (premix) 500 mg 100 mL 0 mg Intravenous Stopped --     05/07/2024 0854 EDT metroNIDAZOLE (FLAGYL) IVPB (premix) 500 mg 100 mL 500 mg Intravenous New Bag --     05/07/2024 0907 EDT iohexol (OMNIPAQUE) 350 MG/ML injection (SINGLE-DOSE) 60 mL 60 mL Intravenous Given --     05/07/2024 0953 EDT fentaNYL injection 50 mcg 50 mcg Intravenous Given --     05/07/2024 1034 EDT piperacillin-tazobactam (ZOSYN) IVPB 4.5 g 0 g Intravenous Stopped --     05/07/2024 1004 EDT piperacillin-tazobactam (ZOSYN) IVPB 4.5 g 4.5 g Intravenous New Bag --     05/07/2024 1605 EDT piperacillin-tazobactam (ZOSYN) IVPB (EXTENDED INFUSION) 4.5 g 4.5 g Intravenous Given delay in IV success     05/07/2024 2025 EDT lactated ringers infusion 125 mL/hr Intravenous Restarted --     05/07/2024 1659 EDT lactated ringers infusion 0 mL/hr Intravenous Stopped --     05/07/2024 1043 EDT lactated ringers infusion 125 mL/hr  Intravenous New Bag --     05/07/2024 1634 EDT HYDROmorphone (DILAUDID) injection 0.5 mg 0.5 mg Intravenous Given --     05/07/2024 1037 EDT pantoprazole (PROTONIX) injection 40 mg 40 mg Intravenous Given --     05/07/2024 1037 EDT fentaNYL injection 50 mcg 50 mcg Intravenous Given --     05/07/2024 2031 EDT trimethobenzamide (TIGAN) IM injection 200 mg 200 mg Intramuscular Given --     05/07/2024 1135 EDT ondansetron (ZOFRAN) injection 4 mg 4 mg Intravenous Given --     05/07/2024 1418 EDT heparin (porcine) injection 7,200 Units 7,200 Units Intravenous Given pump; aptt result     05/07/2024 1422 EDT heparin (porcine) 25,000 units in 0.45% NaCl 250 mL infusion (premix) 18 Units/kg/hr Intravenous Continued from OR --     05/07/2024 1421 EDT heparin (porcine) 25,000 units in 0.45% NaCl 250 mL infusion (premix) 18 Units/kg/hr Intravenous New Bag --     05/07/2024 1603 EDT clopidogrel (PLAVIX) tablet 75 mg 75 mg Oral Given --     05/07/2024 1603 EDT amiodarone tablet 200 mg 200 mg Oral Given --     05/07/2024 1526 EDT ipratropium-albuterol (DUO-NEB) 0.5-2.5 mg/3 mL inhalation solution 3 mL 3 mL Nebulization Given --     05/07/2024 1855 EDT insulin lispro (HumALOG/ADMELOG) 100 units/mL subcutaneous injection 1-6 Units 1 Units Subcutaneous Given --     05/07/2024 1444 EDT perflutren lipid microsphere (DEFINITY) injection 0.6 mL/min Intravenous Given --     05/07/2024 1852 EDT atorvastatin (LIPITOR) tablet 80 mg 80 mg Oral Given --          Past Medical History:   Diagnosis Date    Acute respiratory insufficiency 03/22/2024    Allergic     Chronic HFrEF (heart failure with reduced ejection fraction) (HCC)     Coronary artery disease     COVID-19 virus infection 11/28/2022    Diabetes mellitus (HCC)     Disease of thyroid gland 4/09/2024    Hyperlipidemia     Hypoglycemia     ICD (implantable cardioverter-defibrillator) in place     Ischemic cardiomyopathy     Lactic acidosis 03/22/2024    Myocardial infarction (HCC)  3/22/2024    Otitis media 1966    ST elevation myocardial infarction involving left anterior descending (LAD) coronary artery ([facility]) 03/22/2024    Tobacco abuse     Visual impairment 1967     Present on Admission:   Acquired hypothyroidism   HFrEF (heart failure with reduced ejection fraction) ([facility])   Hyperlipemia   Atrial flutter, paroxysmal ([facility])   Generalized anxiety disorder   Ischemic cardiomyopathy   Coronary artery disease involving native coronary artery of native heart   Chronic low back pain      Admitting Diagnosis: Abdominal pain [R10.9]  PE (pulmonary thromboembolism) ([facility]) [I26.99]  Atrial flutter, paroxysmal ([facility]) [I48.92]  Acute appendicitis, unspecified acute appendicitis type [K35.80]  Acute appendicitis with localized peritonitis, without perforation, abscess, or gangrene [K35.30]  Acute pulmonary embolism, unspecified pulmonary embolism type, unspecified whether acute cor pulmonale present ([facility]) [I26.99]  Age/Sex: 64 y.o. female      Admission Orders:  Scheduled Medications:  amiodarone, 200 mg, Oral, Daily With Breakfast  atorvastatin, 80 mg, Oral, QPM  insulin lispro, 1-6 Units, Subcutaneous, TID AC  magnesium sulfate, 2 g, Intravenous, Once-5/8 x 1   metoprolol tartrate, 12.5 mg, Oral, Q12H MONA  pantoprazole, 40 mg, Intravenous, Q24H MOAN  piperacillin-tazobactam, 4.5 g, Intravenous, Q8H  potassium chloride, 20 mEq, Intravenous, Q2H - 5/8 x 2       Continuous IV Infusions:  dextrose 5 % and sodium chloride 0.45 % with KCl 20 mEq/L, 50 mL/hr, Intravenous, Continuous    heparin (porcine) 25,000 units in 0.45% NaCl 250 mL infusion (premix)  Rate: 2.7-27 mL/hr Dose: 3-30 Units/kg/hr  Weight Dosing Info: 90 kg (Order-Specific)  Freq: Titrated Route: IV  Last Dose: Stopped (05/08/24 0656)  Start: 05/07/24 1145 End: 05/08/24 0650     lactated ringers infusion  Rate: 125 mL/hr Dose: 125 mL/hr  Freq: Continuous Route: IV  Last Dose: Stopped (05/07/24 0833)  Start: 05/07/24 1015 End: 05/07/24 7686        PRN Meds:  acetaminophen, 650 mg, Oral, Q6H PRN  albuterol, 2 puff, Inhalation, Q6H PRN  HYDROmorphone, 0.5 mg, Intravenous, Q3H PRN - 5/7 x 1   oxyCODONE, 10 mg, Oral, Q4H PRN - 5/7x 1 - 5/8 x 2   oxyCODONE, 5 mg, Oral, Q4H PRN - 5/8 x 1   trimethobenzamide, 200 mg, Intramuscular, Q6H PRN - 5/7 x 1           Network Utilization Review Department  ATTENTION: Please call with any questions or concerns to [phone] and carefully listen to the prompts so that you are directed to the right person. All voicemails are confidential.   For Discharge needs, contact Care Management DC Support Team at [phone] opt. 2  Send all requests for admission clinical reviews, approved or denied determinations and any other requests to dedicated fax number below belonging to the [facility] where the patient is receiving treatment. List of dedicated fax numbers for the Facilities:  FACILITY NAME UR FAX NUMBER   ADMISSION DENIALS (Administrative/Medical Necessity) [phone]   DISCHARGE SUPPORT TEAM (NETWORK) [phone]   PARENT CHILD HEALTH (Maternity/NICU/Pediatrics) [phone]   [facility] [phone]   [facility] [phone]   [facility] [phone]   [facility] [phone]   [facility] [phone]   [facility] [phone]   [facility] [phone]   [facility] [phone]   [facility] [phone]   [facility] [phone]   [facility] [phone]   [facility] [phone]          1.69

## 2024-05-08 NOTE — UTILIZATION REVIEW
NOTIFICATION OF INPATIENT ADMISSION   AUTHORIZATION REQUEST   SERVICING FACILITY:   [place] [address]  Tax ID: [id number]  NPI: [id number]   ATTENDING PROVIDER:  Attending Name and NPI#: Billy Colin Do [[id number]]  Address: [address] [place] [address]  Phone: [phone]     ADMISSION INFORMATION:  Place of Service: Inpatient [facility]  Place of Service Code: 21  Inpatient Admission Date/Time: 5/7/24 10:05 AM  Discharge Date/Time: No discharge date for patient encounter.  Admitting Diagnosis Code/Description:  Abdominal pain [R10.9]  PE (pulmonary thromboembolism) ([facility]) [I26.99]  Atrial flutter, paroxysmal (HCC) [I48.92]  Acute appendicitis, unspecified acute appendicitis type [K35.80]  Acute appendicitis with localized peritonitis, without perforation, abscess, or gangrene [K35.30]  Acute pulmonary embolism, unspecified pulmonary embolism type, unspecified whether acute cor pulmonale present (HCC) [I26.99]     UTILIZATION REVIEW CONTACT:  Carli Swartz Utilization   Network Utilization Review Department  Phone: [phone]  Fax: [phone]  Email: [email or web address]  Contact for approvals/pending authorizations, clinical reviews, and discharge.     PHYSICIAN ADVISORY SERVICES:  Medical Necessity Denial & Lssu-us-Ibxu Review  Phone: [phone]  Fax: [phone]  Email: [email or web address]     DISCHARGE SUPPORT TEAM:  For Patients Discharge Needs & Updates  Phone: [phone] opt. 2 Fax: [phone]  Email: [email or web address]

## 2024-05-08 NOTE — ANESTHESIA PREPROCEDURE EVALUATION
Procedure:  APPENDECTOMY LAPAROSCOPIC (Abdomen)    Relevant Problems   CARDIO   (+) Atrial flutter, paroxysmal (HCC)   (+) Coronary artery disease involving native coronary artery of native heart   (+) Hyperlipemia      ENDO   (+) Acquired hypothyroidism   (+) New onset type 2 diabetes mellitus (HCC)   (+) Type 2 diabetes mellitus, without long-term current use of insulin (HCC)      GI/HEPATIC   (+) Coffee ground emesis      HEMATOLOGY   (+) Anemia due to acute blood loss      MUSCULOSKELETAL   (+) Back pain   (+) Chronic low back pain   (+) Sciatica of left side      NEURO/PSYCH   (+) Chronic low back pain   (+) Generalized anxiety disorder      PULMONARY   (+) Viral upper respiratory tract infection          Left Ventricle: Left ventricular cavity size is normal. The left ventricular ejection fraction is 30%. Systolic function is severely reduced.    The following segments are akinetic: apical anterior, apical septal and apex.    The following segments are hypokinetic: mid anteroseptal, mid inferoseptal, apical inferior and apical lateral.    All other segments are normal.    Right Ventricle: Systolic function is normal. Wall motion is normal.    Right Atrium: The atrium is normal in size.  Mobile density could represent thrombus versus Chiari network    Tricuspid Valve: There is mild regurgitation. The estimated right ventricular systolic pressure is 25.00 mmHg.    Physical Exam    Airway    Mallampati score: II  TM Distance: >3 FB  Neck ROM: full     Dental   No notable dental hx     Cardiovascular      Pulmonary      Other Findings  post-pubertal.      Anesthesia Plan  ASA Score- 4 Emergent    Anesthesia Type- general with ASA Monitors.         Additional Monitors:     Airway Plan:            Plan Factors-Exercise tolerance (METS): <4 METS.    Chart reviewed. EKG reviewed. Imaging results reviewed. Existing labs reviewed. Patient summary reviewed.    Patient is not a current smoker.  Patient did not smoke on  day of surgery.            Induction- intravenous.    Postoperative Plan-     Informed Consent- Anesthetic plan and risks discussed with patient.  I personally reviewed this patient with the CRNA. Discussed and agreed on the Anesthesia Plan with the CRNA..

## 2024-05-08 NOTE — PROGRESS NOTES
General Cardiology   Progress Note -  Team One   Vesna Langston 64 y.o. female MRN: [id number]    Unit/Bed#: S -01 Encounter: [id number]    Assessment/ Plan    Acute appendicitis   General surgery following and plan for OR today  Holding heparin gtt and Plavix   On IV antibiotic: Zosyn     2. CAD with recent STEMI 3/22/2024 s/p PCI/NATHAN to ostial/proximal LAD   Antiplatelet: plavix on hold currently for OR. Recommend resume plavix asap post operatively due to concern for in stent thrombosis   Continue atorvastatin 80 mg PO daily and metoprolol 12.5 mg PO BID   No cardiac complaints     3. Ischemic cardiomyopathy   LVEF 35%  On Metoprolol XL 25 mg PO daily, Jardiance 10 mg PO daily and Entresto 24/26 mg PO BID at home for GDMT  On furosemide 20 mg PO PRN at home.   Patient appears euvolemic on exam   Recommend resume GDMT prior to discharge     4. History of monomorphic VT s/p MDT ICD 3/27/2024    5. Paroxysmal atrial flutter   On eliquis 5 mg PO BID at home. Currently on hold. Resume once cleared by general surgery team   On amiodarone 200 mg PO daily and metoprolol 12.5 mg PO BID     6. PE   Pulmonary following   Recommend continue heparin gtt until cleared by surgical team to resume eliquis 5 mg PO  BID   Heparin gtt currently held in the setting of OR today     7. Type II diabetes   Hemoglobin A1C 7.9    8. Hyperlipidemia   LDL 70  On atorvastatin 80 mg PO daily     Subjective  Patient resting in bed. She reports complaint of RLQ abdominal discomfort. No SOB, chest pain or palpitations. No fever or chills.     Review of Systems   Constitutional: Negative for chills and fever.   HENT:  Negative for congestion.    Cardiovascular:  Negative for chest pain, dyspnea on exertion, leg swelling, orthopnea and palpitations.   Respiratory:  Negative for shortness of breath.    Musculoskeletal:  Negative for falls.   Gastrointestinal:  Positive for abdominal pain. Negative for nausea and vomiting.   Neurological:  Negative  "for dizziness and light-headedness.   Psychiatric/Behavioral:  Negative for altered mental status.    All other systems reviewed and are negative.      Objective:   Vitals: Blood pressure 94/54, pulse 85, temperature 100 °F (37.8 °C), temperature source Oral, resp. rate 16, height 5' 8\" (1.727 m), weight 94.3 kg (207 lb 14.3 oz), SpO2 91%.,       Body mass index is 31.61 kg/m².,     Systolic (24hrs), Av , Min:90 , Max:125     Diastolic (24hrs), Av, Min:51, Max:61          Intake/Output Summary (Last 24 hours) at 2024 0849  Last data filed at 2024 2336  Gross per 24 hour   Intake 250 ml   Output 0 ml   Net 250 ml     Weight (last 2 days)       Date/Time Weight    24 0600 94.3 (207.89)    24 2101 94.4 (208.11)    24 1431 93.9 (207)    24 0640 94 (207.23)              Telemetry Review: No telemetry     Physical Exam  Constitutional:       General: She is not in acute distress.     Appearance: She is obese.   HENT:      Head: Normocephalic.      Mouth/Throat:      Mouth: Mucous membranes are moist.   Cardiovascular:      Rate and Rhythm: Normal rate and regular rhythm.      Pulses: Normal pulses.      Heart sounds: No murmur heard.  Pulmonary:      Effort: Pulmonary effort is normal. No respiratory distress.      Breath sounds: Normal breath sounds.   Abdominal:      General: Bowel sounds are normal.      Palpations: Abdomen is soft.      Tenderness: There is abdominal tenderness.   Musculoskeletal:         General: No swelling. Normal range of motion.      Cervical back: Neck supple.   Skin:     General: Skin is warm and dry.      Capillary Refill: Capillary refill takes less than 2 seconds.   Neurological:      Mental Status: She is alert and oriented to person, place, and time.   Psychiatric:         Mood and Affect: Mood normal.         LABORATORY RESULTS      CBC with diff:   Results from last 7 days   Lab Units 24  0612 24  0647   WBC Thousand/uL 25.37* 16.82* " "  HEMOGLOBIN g/dL 10.0* 11.7   HEMATOCRIT % 32.3* 36.7   MCV fL 92 92   PLATELETS Thousands/uL 260 305   RBC Million/uL 3.52* 3.99   MCH pg 28.4 29.3   MCHC g/dL 31.0* 31.9   RDW % 14.9 14.3   MPV fL 9.7 10.1   NRBC AUTO /100 WBCs 0 0       CMP:  Results from last 7 days   Lab Units 05/08/24  0612 05/07/24  0647   POTASSIUM mmol/L 3.6 3.9   CHLORIDE mmol/L 99 103   CO2 mmol/L 25 24   BUN mg/dL 10 13   CREATININE mg/dL 0.79 0.79   CALCIUM mg/dL 8.7 9.5   AST U/L  --  23   ALT U/L  --  23   ALK PHOS U/L  --  44   EGFR ml/min/1.73sq m 78 78       BMP:  Results from last 7 days   Lab Units 05/08/24  0612 05/07/24  0647   POTASSIUM mmol/L 3.6 3.9   CHLORIDE mmol/L 99 103   CO2 mmol/L 25 24   BUN mg/dL 10 13   CREATININE mg/dL 0.79 0.79   CALCIUM mg/dL 8.7 9.5        Results from last 7 days   Lab Units 05/08/24  0612   MAGNESIUM mg/dL 1.7*       Results from last 7 days   Lab Units 05/07/24  1247   INR  1.08       Lipid Profile:   No results found for: \"CHOL\"  Lab Results   Component Value Date    HDL 45 (L) 04/22/2024    HDL 50 03/22/2024     Lab Results   Component Value Date    LDLCALC 70 04/22/2024    LDLCALC 236 (H) 03/22/2024     Lab Results   Component Value Date    TRIG 137 04/22/2024    TRIG 185 (H) 03/22/2024       Cardiac testing:   No results found for this or any previous visit.    No results found for this or any previous visit.    No results found for this or any previous visit.    No valid procedures specified.  No results found for this or any previous visit.        Meds/Allergies   all current active meds have been reviewed  Medications Prior to Admission   Medication    acetaminophen (TYLENOL) 650 mg CR tablet    albuterol (ProAir HFA) 90 mcg/act inhaler    amiodarone 200 mg tablet    apixaban (ELIQUIS) 5 mg    atorvastatin (LIPITOR) 80 mg tablet    clopidogrel (PLAVIX) 75 mg tablet    Empagliflozin (Jardiance) 25 MG TABS    escitalopram (LEXAPRO) 10 mg tablet    furosemide (LASIX) 20 mg tablet    " gabapentin (Neurontin) 100 mg capsule    levothyroxine (Synthroid) 50 mcg tablet    meclizine (ANTIVERT) 25 mg tablet    metoprolol succinate (TOPROL-XL) 25 mg 24 hr tablet    Multiple Vitamin (MULTIVITAMIN) capsule    pantoprazole (PROTONIX) 40 mg tablet    sacubitril-valsartan (Entresto) 24-26 MG TABS    sucralfate (CARAFATE) 1 g tablet       dextrose 5 % and sodium chloride 0.45 % with KCl 20 mEq/L, 50 mL/hr, Last Rate: 50 mL/hr (05/07/24 1867)      Counseling / Coordination of Care  Total floor / unit time spent today 20 minutes.  Greater than 50% of total time was spent with the patient and / or family counseling and / or coordination of care.      ** Please Note: Dragon 360 Dictation voice to text software may have been used in the creation of this document. **

## 2024-05-08 NOTE — PROGRESS NOTES
PULMONOLOGY PROGRESS NOTE     Name: Vesna Langston   Age & Sex: 64 y.o. female   MRN: [id number]  Unit/Bed#: S -01   Encounter: [id number]    PATIENT INFORMATION     Name: Vesna Langston   Age & Sex: 64 y.o. female   MRN: [id number]  Hospital Stay Days: 1    ASSESSMENT/PLAN     Assessment:     Patient is a 64-year-old female with a past medical history of a flutter on Eliquis, CAD status post STEMI in 2024 who initially presented with abdominal pain on 2024 and found to have acute appendicitis.  The patient was additionally found to have a single subsegmental right lower lobe pulmonary embolus.    Acute appendicitis  Pulmonary embolus  Acute hypoxemic respiratory failure  A flutter  CAD  Hyperlipidemia  Systolic heart failure    Plan:  Plan for OR today, hold heparin for now, restart when able with plans for eventual transition to PO  SCDs, DVT precautions  Incentive spirometer, out of bed to chair  Wean oxygen for goal SpO2 greater than 88% or per patient comfort  Respiratory protocol  Rest of care per primary      SUBJECTIVE     Patient seen and examined. No acute events overnight. Still with some abdominal pain, but improved. All other ROS negative.    OBJECTIVE     Vitals:    24 2110 24 0600 24 0713 24 0930   BP: 114/60  94/54 97/55   BP Location:   Right arm    Pulse: 80  85 80   Resp:   16    Temp:   100 °F (37.8 °C)    TempSrc:   Oral    SpO2: 94%  91%    Weight:  94.3 kg (207 lb 14.3 oz)     Height:          Temperature:   Temp (24hrs), Av.4 °F (37.4 °C), Min:99.1 °F (37.3 °C), Max:100 °F (37.8 °C)    Temperature: 100 °F (37.8 °C)  Intake & Output:  I/O          07 07    P.O.  0     IV Piggyback  550     Total Intake(mL/kg)  550 (5.8)     Urine (mL/kg/hr)  0 (0)     Total Output  0     Net  +550                  Weights:   IBW (Ideal Body Weight): 63.9 kg    Body mass index is 31.61 kg/m².  Weight (last 2  days)       Date/Time Weight    05/08/24 0600 94.3 (207.89)    05/07/24 2101 94.4 (208.11)    05/07/24 1431 93.9 (207)    05/07/24 0640 94 (207.23)          Physical Exam  Vitals reviewed.   Constitutional:       General: She is not in acute distress.  HENT:      Head: Normocephalic and atraumatic.      Mouth/Throat:      Mouth: Mucous membranes are moist.      Pharynx: Oropharynx is clear.   Eyes:      Extraocular Movements: Extraocular movements intact.      Pupils: Pupils are equal, round, and reactive to light.   Cardiovascular:      Rate and Rhythm: Normal rate and regular rhythm.      Heart sounds: Normal heart sounds.   Pulmonary:      Effort: Pulmonary effort is normal.      Breath sounds: Normal breath sounds.   Abdominal:      General: Abdomen is flat.      Palpations: Abdomen is soft.      Tenderness: There is abdominal tenderness.   Skin:     Capillary Refill: Capillary refill takes less than 2 seconds.   Neurological:      General: No focal deficit present.      Mental Status: She is alert.           LABORATORY DATA     Labs: I have personally reviewed pertinent reports.  Results from last 7 days   Lab Units 05/08/24  0612 05/07/24  0647   WBC Thousand/uL 25.37* 16.82*   HEMOGLOBIN g/dL 10.0* 11.7   HEMATOCRIT % 32.3* 36.7   PLATELETS Thousands/uL 260 305   SEGS PCT % 80* 82*   MONO PCT % 9 7   EOS PCT % 0 2      Results from last 7 days   Lab Units 05/08/24  0612 05/07/24  0647   POTASSIUM mmol/L 3.6 3.9   CHLORIDE mmol/L 99 103   CO2 mmol/L 25 24   BUN mg/dL 10 13   CREATININE mg/dL 0.79 0.79   CALCIUM mg/dL 8.7 9.5   ALK PHOS U/L  --  44   ALT U/L  --  23   AST U/L  --  23     Results from last 7 days   Lab Units 05/08/24  0612   MAGNESIUM mg/dL 1.7*     Results from last 7 days   Lab Units 05/08/24  0612   PHOSPHORUS mg/dL 2.9      Results from last 7 days   Lab Units 05/07/24  2031 05/07/24  1247   INR   --  1.08   PTT seconds 141* 27         IMAGING & DIAGNOSTIC TESTING     Radiology Results: I  have personally reviewed pertinent reports.  CTA ED chest PE study    Result Date: 5/7/2024  Impression: Single subsegmental right lower lobe pulmonary embolus as shown on abdominal CT. RV/LV diameter ratio less than 1 with nothing to indicate right heart strain. Question mild interstitial edema. Workstation performed: [id number]     CT abdomen pelvis with contrast    Result Date: 5/7/2024  Impression: 1. Acute appendicitis. 2. Partial visualization of suspected pulmonary embolus. Consider dedicated chest CTA to further evaluate. I personally discussed this study with LEANNE LEWIS on 5/7/2024 8:28 AM. Workstation performed: [id number]     Other Diagnostic Testing: I have personally reviewed pertinent reports.    ACTIVE MEDICATIONS     Current Facility-Administered Medications   Medication Dose Route Frequency    acetaminophen (TYLENOL) tablet 650 mg  650 mg Oral Q6H PRN    albuterol (PROVENTIL HFA,VENTOLIN HFA) inhaler 2 puff  2 puff Inhalation Q6H PRN    amiodarone tablet 200 mg  200 mg Oral Daily With Breakfast    atorvastatin (LIPITOR) tablet 80 mg  80 mg Oral QPM    dextrose 5 % and sodium chloride 0.45 % with KCl 20 mEq/L infusion  50 mL/hr Intravenous Continuous    HYDROmorphone (DILAUDID) injection 0.5 mg  0.5 mg Intravenous Q3H PRN    insulin lispro (HumALOG/ADMELOG) 100 units/mL subcutaneous injection 1-6 Units  1-6 Units Subcutaneous TID AC    magnesium sulfate 2 g/50 mL IVPB (premix) 2 g  2 g Intravenous Once    metoprolol tartrate (LOPRESSOR) partial tablet 12.5 mg  12.5 mg Oral Q12H MONA    oxyCODONE (ROXICODONE) immediate release tablet 10 mg  10 mg Oral Q4H PRN    oxyCODONE (ROXICODONE) IR tablet 5 mg  5 mg Oral Q4H PRN    pantoprazole (PROTONIX) injection 40 mg  40 mg Intravenous Q24H MONA    piperacillin-tazobactam (ZOSYN) IVPB (EXTENDED INFUSION) 4.5 g  4.5 g Intravenous Q8H    potassium chloride 20 mEq IVPB (premix)  20 mEq Intravenous Q2H    trimethobenzamide (TIGAN) IM injection 200 mg   "200 mg Intramuscular Q6H PRN         Disclaimer: Portions of the record may have been created with voice recognition software. Occasional wrong word or \"sound a like\" substitutions may have occurred due to the inherent limitations of voice recognition software. Careful consideration should be taken to recognize, using context, where substitutions have occurred.    Ganesh Worthington DO, MS  Pulmonary and Critical Care Fellow, PGY-IV  [facility]   "

## 2024-05-08 NOTE — PLAN OF CARE
Problem: Potential for Falls  Goal: Patient will remain free of falls  Description: INTERVENTIONS:  - Educate patient/family on patient safety including physical limitations  - Instruct patient to call for assistance with activity   - Consult OT/PT to assist with strengthening/mobility   - Keep Call bell within reach  - Keep bed low and locked with side rails adjusted as appropriate  - Keep care items and personal belongings within reach  - Initiate and maintain comfort rounds  - Make Fall Risk Sign visible to staff  Outcome: Progressing     Problem: PAIN - ADULT  Goal: Verbalizes/displays adequate comfort level or baseline comfort level  Description: Interventions:  - Encourage patient to monitor pain and request assistance  - Assess pain using appropriate pain scale  - Administer analgesics based on type and severity of pain and evaluate response  - Implement non-pharmacological measures as appropriate and evaluate response  - Consider cultural and social influences on pain and pain management  - Notify physician/advanced practitioner if interventions unsuccessful or patient reports new pain  Outcome: Progressing     Problem: INFECTION - ADULT  Goal: Absence or prevention of progression during hospitalization  Description: INTERVENTIONS:  - Assess and monitor for signs and symptoms of infection  - Monitor lab/diagnostic results  - Monitor all insertion sites, i.e. indwelling lines, tubes, and drains  - Monitor endotracheal if appropriate and nasal secretions for changes in amount and color  - [place] appropriate cooling/warming therapies per order  - Administer medications as ordered  - Instruct and encourage patient and family to use good hand hygiene technique  - Identify and instruct in appropriate isolation precautions for identified infection/condition  Outcome: Progressing     Problem: DISCHARGE PLANNING  Goal: Discharge to home or other facility with appropriate resources  Description: INTERVENTIONS:  -  Identify barriers to discharge w/patient and caregiver  - Arrange for needed discharge resources and transportation as appropriate  - Identify discharge learning needs (meds, wound care, etc.)  - Arrange for interpretive services to assist at discharge as needed  - Refer to Case Management Department for coordinating discharge planning if the patient needs post-hospital services based on physician/advanced practitioner order or complex needs related to functional status, cognitive ability, or social support system  Outcome: Progressing     Problem: Knowledge Deficit  Goal: Patient/family/caregiver demonstrates understanding of disease process, treatment plan, medications, and discharge instructions  Description: Complete learning assessment and assess knowledge base.  Interventions:  - Provide teaching at level of understanding  - Provide teaching via preferred learning methods  Outcome: Progressing     Problem: Prexisting or High Potential for Compromised Skin Integrity  Goal: Skin integrity is maintained or improved  Description: INTERVENTIONS:  - Identify patients at risk for skin breakdown  - Assess and monitor skin integrity  - Assess and monitor nutrition and hydration status  - Monitor labs   - Assess for incontinence   - Turn and reposition patient  - Assist with mobility/ambulation  - Relieve pressure over bony prominences  - Avoid friction and shearing  - Provide appropriate hygiene as needed including keeping skin clean and dry  - Evaluate need for skin moisturizer/barrier cream  - Collaborate with interdisciplinary team   - Patient/family teaching  - Consider wound care consult   Outcome: Progressing

## 2024-05-08 NOTE — QUICK NOTE
Post Op Check:    Vesna Langston is a 64 y.o. female with acute appendicitis in the setting of STEMI 6 weeks ago, new incidental RLL subsegmental PE   S/p 5/8 lap appy    Saw patient at the bedside once they arrived to the floor.   Patients pain is well controlled. They denied any nausea, chest pain, or shortness of breath.     GEN: NAD, A&O  Chest: Normal work of breathing, no respiratory distress  Abd: incisions intact, abdomen soft    Plan:  Diet Rik/CHO Controlled; Consistent Carbohydrate Diet Level 2 (5 carb servings/75 grams CHO/meal)  Continue IVF  Pain control PRN  Restart heparin gtt at 11 pm  Plavix can restart tomorrow     Jason Abraham MD  Surgery, PGY-3

## 2024-05-08 NOTE — OP NOTE
OPERATIVE REPORT  PATIENT NAME: Vesna Langston    :  1960  MRN: [id number]  Pt Location: AN OR ROOM 01    SURGERY DATE: 2024    Surgeons and Role:     * Lauryn Ullrich, DO - Primary     * Jason Abraham MD - Assisting    Preop Diagnosis:  Acute appendicitis, unspecified acute appendicitis type [K35.80]    Post-Op Diagnosis Codes:     * Acute appendicitis, unspecified acute appendicitis type [K35.80]    Procedure(s):  APPENDECTOMY LAPAROSCOPIC    Specimen(s):  ID Type Source Tests Collected by Time Destination   1 : appendix Tissue Appendix TISSUE EXAM Lauryn Ullrich, DO 2024 1404        Estimated Blood Loss:   Minimal    Drains:  [REMOVED] Urethral Catheter Latex;Double-lumen 16 Fr. (Removed)   Number of days: 0       Anesthesia Type:   General    Operative Indications:  Acute appendicitis, unspecified acute appendicitis type [K35.80]      Operative Findings:  Gangrenous mid to tip appendix with healthy base at cecum  Stapled appendectomy  Stapled mesoappendix with white load x 3    Complications:   None    Procedure and Technique:  The patient was brought to the operating arena and placed in supine position. All regular monitoring devices were connected. The patient underwent general anesthesia with endotracheal intubation without complication. The patient received perioperative antibiotics. The patient received subcutaneous heparin in addition to bilateral lower extremity sequential compression devices for DVT prophylaxis.  The left arm was tucked.  A timeout was performed prior to incision to ensure correct patient position, procedure, and site.    5 mm incision made infraumbilically.  Incision carried down to fascia where a small umbilical hernia defect was encountered.  The 5 mm trocar introduced through this defect.  Proceeded with inflation.  Patient tolerated well.  2 additional trocars in the left lower quadrant and lower midline placed under direct realization.  Left lower quadrant trocar  12 mm and lower midline 5 mm.    Attention was then turned to the appendix.  On inspection this was frankly gangrenous with full-thickness disease through the mid appendix to the tip.  The base appeared to have healthy tissue going into the cecum.  At this location a window was created with the harmonic and the stapler was introduced and fired at the base of the appendix.  Staple line inspected and appeared hemostatic.  White load staples x 3 were used on the mesoappendix.  All staple lines inspected and appeared hemostatic.  Local irrigation performed.    Left lower quadrant trocar site closed with laparoscopic suture passer.  Skin incisions closed with Monocryl and glue.    The patient tolerated procedure well was taken to the post anesthesia care unit in stable condition. All lap, needle, and instrument counts were correct.    Dr. Ullrich was present for the entire procedure.    Patient Disposition:  PACU     This procedure was not performed to treat colon cancer through resection      SIGNATURE: Jason Abraham MD  DATE: May 8, 2024  TIME: 2:47 PM

## 2024-05-08 NOTE — PROGRESS NOTES
Progress Note - General Surgery  : RA Surgery Resident on [facility]    Vesna Langston 64 y.o. female MRN: [id number]  Unit/Bed#: ICU 12 Encounter: [id number]      Assessment:  64 y.o. female with acute appendicitis in the setting of STEMI 6 weeks ago, new incidental RLL subsegmental PE       Plan:  Hep gtt + Plavix   Hold both this morning  Appreciate cardiology & pulmonary input  Zosyn  NPO  Added-on for OR today   Will discuss with attending whether or not to continue nonoperative management        Subjective: Feels the same, no improvement. Significant discomfort.      Objective:     Physical Exam:  GEN: NAD   Ab: Soft, significantly tender RLQ and moderately tender near umbilicus/ND  Lung: Normal effort   CV: RRR   Extrem: No CCE   Neuro: A+Ox3       I/O         05/06 0701 05/07 0700 05/07 0701 05/08 0700    IV Piggyback  550    Total Intake(mL/kg)  550 (5.8)    Net  +550                  Lab, Imaging and other studies: I have personally reviewed pertinent reports.  , CBC with diff:   Lab Results   Component Value Date    WBC 16.82 (H) 05/07/2024    HGB 11.7 05/07/2024    HCT 36.7 05/07/2024    MCV 92 05/07/2024     05/07/2024    RBC 3.99 05/07/2024    MCH 29.3 05/07/2024    MCHC 31.9 05/07/2024    RDW 14.3 05/07/2024    MPV 10.1 05/07/2024    NRBC 0 05/07/2024   , BMP/CMP:   Lab Results   Component Value Date    SODIUM 138 05/07/2024    K 3.9 05/07/2024     05/07/2024    CO2 24 05/07/2024    BUN 13 05/07/2024    CREATININE 0.79 05/07/2024    CALCIUM 9.5 05/07/2024    AST 23 05/07/2024    ALT 23 05/07/2024    ALKPHOS 44 05/07/2024    EGFR 78 05/07/2024         VTE Pharmacologic Prophylaxis: Heparin      Maury Ortiz MD  5/7/2024 9:44 PM

## 2024-05-09 LAB
ANION GAP SERPL CALCULATED.3IONS-SCNC: 8 MMOL/L (ref 4–13)
APTT PPP: 40 SECONDS (ref 23–37)
APTT PPP: 44 SECONDS (ref 23–37)
BUN SERPL-MCNC: 12 MG/DL (ref 5–25)
CALCIUM SERPL-MCNC: 8.9 MG/DL (ref 8.4–10.2)
CHLORIDE SERPL-SCNC: 101 MMOL/L (ref 96–108)
CO2 SERPL-SCNC: 24 MMOL/L (ref 21–32)
CREAT SERPL-MCNC: 0.72 MG/DL (ref 0.6–1.3)
ERYTHROCYTE [DISTWIDTH] IN BLOOD BY AUTOMATED COUNT: 14.4 % (ref 11.6–15.1)
GFR SERPL CREATININE-BSD FRML MDRD: 87 ML/MIN/1.73SQ M
GLUCOSE SERPL-MCNC: 179 MG/DL (ref 65–140)
GLUCOSE SERPL-MCNC: 183 MG/DL (ref 65–140)
GLUCOSE SERPL-MCNC: 206 MG/DL (ref 65–140)
GLUCOSE SERPL-MCNC: 210 MG/DL (ref 65–140)
GLUCOSE SERPL-MCNC: 216 MG/DL (ref 65–140)
HCT VFR BLD AUTO: 29.6 % (ref 34.8–46.1)
HGB BLD-MCNC: 9.3 G/DL (ref 11.5–15.4)
MAGNESIUM SERPL-MCNC: 2.1 MG/DL (ref 1.9–2.7)
MCH RBC QN AUTO: 28.9 PG (ref 26.8–34.3)
MCHC RBC AUTO-ENTMCNC: 31.4 G/DL (ref 31.4–37.4)
MCV RBC AUTO: 92 FL (ref 82–98)
PHOSPHATE SERPL-MCNC: 2.4 MG/DL (ref 2.3–4.1)
PLATELET # BLD AUTO: 202 THOUSANDS/UL (ref 149–390)
PMV BLD AUTO: 10.4 FL (ref 8.9–12.7)
POTASSIUM SERPL-SCNC: 4 MMOL/L (ref 3.5–5.3)
RBC # BLD AUTO: 3.22 MILLION/UL (ref 3.81–5.12)
SODIUM SERPL-SCNC: 133 MMOL/L (ref 135–147)
WBC # BLD AUTO: 22.47 THOUSAND/UL (ref 4.31–10.16)

## 2024-05-09 PROCEDURE — 82948 REAGENT STRIP/BLOOD GLUCOSE: CPT

## 2024-05-09 PROCEDURE — C9113 INJ PANTOPRAZOLE SODIUM, VIA: HCPCS | Performed by: STUDENT IN AN ORGANIZED HEALTH CARE EDUCATION/TRAINING PROGRAM

## 2024-05-09 PROCEDURE — 85730 THROMBOPLASTIN TIME PARTIAL: CPT | Performed by: INTERNAL MEDICINE

## 2024-05-09 PROCEDURE — 99024 POSTOP FOLLOW-UP VISIT: CPT | Performed by: SURGERY

## 2024-05-09 PROCEDURE — 85730 THROMBOPLASTIN TIME PARTIAL: CPT | Performed by: SURGERY

## 2024-05-09 PROCEDURE — 80048 BASIC METABOLIC PNL TOTAL CA: CPT | Performed by: STUDENT IN AN ORGANIZED HEALTH CARE EDUCATION/TRAINING PROGRAM

## 2024-05-09 PROCEDURE — 99232 SBSQ HOSP IP/OBS MODERATE 35: CPT | Performed by: STUDENT IN AN ORGANIZED HEALTH CARE EDUCATION/TRAINING PROGRAM

## 2024-05-09 PROCEDURE — 85027 COMPLETE CBC AUTOMATED: CPT | Performed by: STUDENT IN AN ORGANIZED HEALTH CARE EDUCATION/TRAINING PROGRAM

## 2024-05-09 PROCEDURE — 99232 SBSQ HOSP IP/OBS MODERATE 35: CPT | Performed by: INTERNAL MEDICINE

## 2024-05-09 PROCEDURE — 84100 ASSAY OF PHOSPHORUS: CPT | Performed by: STUDENT IN AN ORGANIZED HEALTH CARE EDUCATION/TRAINING PROGRAM

## 2024-05-09 PROCEDURE — 83735 ASSAY OF MAGNESIUM: CPT | Performed by: STUDENT IN AN ORGANIZED HEALTH CARE EDUCATION/TRAINING PROGRAM

## 2024-05-09 RX ORDER — MECLIZINE HYDROCHLORIDE 25 MG/1
25 TABLET ORAL EVERY 8 HOURS PRN
Status: DISCONTINUED | OUTPATIENT
Start: 2024-05-09 | End: 2024-05-12 | Stop reason: HOSPADM

## 2024-05-09 RX ORDER — CLOPIDOGREL BISULFATE 75 MG/1
75 TABLET ORAL DAILY
Status: DISCONTINUED | OUTPATIENT
Start: 2024-05-09 | End: 2024-05-12 | Stop reason: HOSPADM

## 2024-05-09 RX ORDER — FUROSEMIDE 20 MG/1
20 TABLET ORAL ONCE
Status: COMPLETED | OUTPATIENT
Start: 2024-05-09 | End: 2024-05-09

## 2024-05-09 RX ORDER — LEVOTHYROXINE SODIUM 0.05 MG/1
50 TABLET ORAL DAILY
Status: DISCONTINUED | OUTPATIENT
Start: 2024-05-09 | End: 2024-05-12 | Stop reason: HOSPADM

## 2024-05-09 RX ORDER — GABAPENTIN 100 MG/1
100 CAPSULE ORAL 2 TIMES DAILY
Status: DISCONTINUED | OUTPATIENT
Start: 2024-05-09 | End: 2024-05-12 | Stop reason: HOSPADM

## 2024-05-09 RX ORDER — SUCRALFATE 1 G/1
1 TABLET ORAL
Status: DISCONTINUED | OUTPATIENT
Start: 2024-05-09 | End: 2024-05-12 | Stop reason: HOSPADM

## 2024-05-09 RX ORDER — ESCITALOPRAM OXALATE 10 MG/1
10 TABLET ORAL DAILY
Status: DISCONTINUED | OUTPATIENT
Start: 2024-05-09 | End: 2024-05-12 | Stop reason: HOSPADM

## 2024-05-09 RX ORDER — PANTOPRAZOLE SODIUM 40 MG/1
40 TABLET, DELAYED RELEASE ORAL
Status: DISCONTINUED | OUTPATIENT
Start: 2024-05-10 | End: 2024-05-12 | Stop reason: HOSPADM

## 2024-05-09 RX ORDER — FUROSEMIDE 10 MG/ML
20 INJECTION INTRAMUSCULAR; INTRAVENOUS ONCE
Status: DISCONTINUED | OUTPATIENT
Start: 2024-05-09 | End: 2024-05-09

## 2024-05-09 RX ADMIN — INSULIN LISPRO 2 UNITS: 100 INJECTION, SOLUTION INTRAVENOUS; SUBCUTANEOUS at 12:23

## 2024-05-09 RX ADMIN — POLYETHYLENE GLYCOL 3350 17 G: 17 POWDER, FOR SOLUTION ORAL at 17:27

## 2024-05-09 RX ADMIN — SUCRALFATE 1 G: 1 TABLET ORAL at 16:23

## 2024-05-09 RX ADMIN — CLOPIDOGREL BISULFATE 75 MG: 75 TABLET ORAL at 08:48

## 2024-05-09 RX ADMIN — OXYCODONE HYDROCHLORIDE 10 MG: 10 TABLET ORAL at 18:47

## 2024-05-09 RX ADMIN — SUCRALFATE 1 G: 1 TABLET ORAL at 12:15

## 2024-05-09 RX ADMIN — AMIODARONE HYDROCHLORIDE 200 MG: 200 TABLET ORAL at 08:00

## 2024-05-09 RX ADMIN — OXYCODONE HYDROCHLORIDE 5 MG: 5 TABLET ORAL at 02:55

## 2024-05-09 RX ADMIN — PANTOPRAZOLE SODIUM 40 MG: 40 INJECTION, POWDER, FOR SOLUTION INTRAVENOUS at 08:50

## 2024-05-09 RX ADMIN — LEVOTHYROXINE SODIUM 50 MCG: 50 TABLET ORAL at 08:50

## 2024-05-09 RX ADMIN — SUCRALFATE 1 G: 1 TABLET ORAL at 08:50

## 2024-05-09 RX ADMIN — FUROSEMIDE 20 MG: 20 TABLET ORAL at 09:58

## 2024-05-09 RX ADMIN — PIPERACILLIN SODIUM AND TAZOBACTAM SODIUM 4.5 G: 36; 4.5 INJECTION, POWDER, LYOPHILIZED, FOR SOLUTION INTRAVENOUS at 15:00

## 2024-05-09 RX ADMIN — INSULIN LISPRO 1 UNITS: 100 INJECTION, SOLUTION INTRAVENOUS; SUBCUTANEOUS at 08:56

## 2024-05-09 RX ADMIN — INSULIN LISPRO 2 UNITS: 100 INJECTION, SOLUTION INTRAVENOUS; SUBCUTANEOUS at 17:00

## 2024-05-09 RX ADMIN — SODIUM PHOSPHATE, MONOBASIC, MONOHYDRATE AND SODIUM PHOSPHATE, DIBASIC, ANHYDROUS 12 MMOL: 142; 276 INJECTION, SOLUTION INTRAVENOUS at 10:00

## 2024-05-09 RX ADMIN — PIPERACILLIN SODIUM AND TAZOBACTAM SODIUM 4.5 G: 36; 4.5 INJECTION, POWDER, LYOPHILIZED, FOR SOLUTION INTRAVENOUS at 05:39

## 2024-05-09 RX ADMIN — GABAPENTIN 100 MG: 100 CAPSULE ORAL at 17:23

## 2024-05-09 RX ADMIN — SUCRALFATE 1 G: 1 TABLET ORAL at 22:15

## 2024-05-09 RX ADMIN — OXYCODONE HYDROCHLORIDE 10 MG: 10 TABLET ORAL at 09:58

## 2024-05-09 RX ADMIN — Medication 12.5 MG: at 21:46

## 2024-05-09 RX ADMIN — ESCITALOPRAM OXALATE 10 MG: 10 TABLET ORAL at 08:48

## 2024-05-09 RX ADMIN — PIPERACILLIN SODIUM AND TAZOBACTAM SODIUM 4.5 G: 36; 4.5 INJECTION, POWDER, LYOPHILIZED, FOR SOLUTION INTRAVENOUS at 22:37

## 2024-05-09 RX ADMIN — ATORVASTATIN CALCIUM 80 MG: 40 TABLET, FILM COATED ORAL at 17:23

## 2024-05-09 NOTE — PROGRESS NOTES
The pantoprazole has / have been converted to Oral per [facility] IV-to-PO Auto-Conversion Protocol for Adults as approved by the Pharmacy and Therapeutics Committee. The patient met all eligible criteria:  1) Age = 18 years old   2) Received at least one dose of the IV form   3) Receiving at least one other scheduled oral/enteral medication   4) Tolerating an oral/enteral diet   and did not have any exclusions:   1) Critical care patient   2) Active GI bleed (IF assessing H2RAs or PPIs)   3) Continuous tube feeding (IF assessing cipro, doxycycline, levofloxacin, minocycline, rifampin, or voriconazole)   4) Receiving PO vancomycin (IF assessing metronidazole)   5) Persistent nausea and/or vomiting   6) Ileus or gastrointestinal obstruction   7) Rosas/nasogastric tube set for continuous suction   8) Specific order not to automatically convert to PO (in the order's comments or if discussed in the most recent Infectious Disease or primary team's progress notes).

## 2024-05-09 NOTE — PROGRESS NOTES
"Progress Note - Pulmonary   Vesna Langston 64 y.o. female MRN: [id number]  Unit/Bed#: S -01 Encounter: [id number]    Assessment:  Patient is a 64-year-old female with a past medical history of a flutter on Eliquis, CAD status post STEMI in March 2024 who initially presented with abdominal pain on 5/7/2024 and found to have acute appendicitis.  The patient was additionally found to have a single subsegmental right lower lobe pulmonary embolus.     Acute appendicitis  Pulmonary embolus  Acute hypoxemic respiratory failure  A flutter  CAD  Hyperlipidemia  Systolic heart failure     Plan:  Heparin gtt for now, may transition to Eliquis twice daily once stable form  Surgery perspective   SCDs, DVT precautions  Incentive spirometer, out of bed to chair  Wean oxygen for goal SpO2 greater than 88% or per patient comfort  Respiratory protocol  Rest of care per primary      Subjective:   No acute event overnight. This morning patient report burning with urination but denied chest pain, shortness of breath and chest pain.     Objective:     Vitals: Blood pressure 100/62, pulse 72, temperature 98.1 °F (36.7 °C), temperature source Oral, resp. rate 16, height 5' 8\" (1.727 m), weight 96.3 kg (212 lb 3.2 oz), SpO2 91%.,Body mass index is 32.26 kg/m².      Intake/Output Summary (Last 24 hours) at 5/9/2024 1128  Last data filed at 5/9/2024 0900  Gross per 24 hour   Intake 1180 ml   Output 55 ml   Net 1125 ml       Invasive Devices       Peripheral Intravenous Line  Duration             Peripheral IV 05/07/24 Left Antecubital 1 day    Peripheral IV 05/07/24 Right;Ventral (anterior) Hand 1 day                    Physical Exam: /62 (BP Location: Right arm)   Pulse 72   Temp 98.1 °F (36.7 °C) (Oral)   Resp 16   Ht 5' 8\" (1.727 m)   Wt 96.3 kg (212 lb 3.2 oz)   SpO2 91%   BMI 32.26 kg/m²     General Appearance:    Alert, cooperative, no distress, appears stated age   Head:    Normocephalic, without obvious abnormality, " atraumatic   Eyes:    PERRL, conjunctiva/corneas clear, EOM's intact, fundi     benign, both eyes   Ears:    Normal TM's and external ear canals, both ears   Nose:   Nares normal, septum midline, mucosa normal, no drainage    or sinus tenderness   Throat:   Lips, mucosa, and tongue normal; teeth and gums normal   Neck:   Supple, symmetrical, trachea midline, no adenopathy;     thyroid:  no enlargement/tenderness/nodules; no carotid    bruit or JVD   Back:     Symmetric, no curvature, ROM normal, no CVA tenderness   Lungs:     Clear to auscultation bilaterally, respirations unlabored   Chest Wall:    No tenderness or deformity    Heart:    Regular rate and rhythm, S1 and S2 normal, no murmur, rub   or gallop   Breast Exam:    No tenderness, masses, or nipple abnormality   Abdomen:     Soft, non-tender, bowel sounds active all four quadrants,     no masses, no organomegaly   Genitalia:    Normal female without lesion, discharge or tenderness   Rectal:    Normal tone, no masses or tenderness; guaiac negative stool   Extremities:   Extremities normal, atraumatic, no cyanosis or edema   Pulses:   2+ and symmetric all extremities   Skin:   Skin color, texture, turgor normal, no rashes or lesions   Lymph nodes:   Cervical, supraclavicular, and axillary nodes normal   Neurologic:   CNII-XII intact, normal strength, sensation and reflexes     throughout        Labs: I have personally reviewed pertinent lab results.  Imaging and other studies: I have personally reviewed pertinent reports.

## 2024-05-09 NOTE — PROGRESS NOTES
General Cardiology   Progress Note -  [facility]   Vesna Langston 64 y.o. female MRN: [id number]    Unit/Bed#: S -01 Encounter: [id number]    Assessment/ Plan    Gangrenous appendicitis   POD #1 s/p laparoscopic appendectomy   On IV antibiotic: Zosyn   Followed by general surgery      2. CAD with recent STEMI 3/22/2024 s/p PCI/NATHAN to ostial/proximal LAD   Plavix resumed today   Continue atorvastatin 80 mg PO daily and metoprolol 12.5 mg PO BID   No cardiac complaints      3. Ischemic cardiomyopathy   LVEF 35%  On Metoprolol XL 25 mg PO daily and Entresto 24/26 mg PO BID at home for GDMT. Entresto was just ordered in outpatient setting and patient did not start prior to admission. Plan per primary cardiologist is to start Entresto and then obtain labs prior to initiation of Jardiance.   On furosemide 20 mg PO PRN at home.   Recommend resume GDMT prior to discharge   I/Os: +945 ml in 24 hours   Daily weights: 212 lbs today from 207 lbs yesterday (both standing scale)   Will give furosemide 20 mg PO x 1 dose now. BP borderline      4. 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 Paroxysmal atrial flutter   On eliquis 5 mg PO BID at home. Currently on hold. Resume once cleared by general surgery team   On heparin gtt currently   On amiodarone 200 mg PO daily and metoprolol 12.5 mg PO BID      6. PE   Pulmonary following   Recommend continue heparin gtt until cleared by surgical team to resume eliquis 5 mg PO  BID     7. Type II diabetes   Hemoglobin A1C 7.9     8. Hyperlipidemia   LDL 70  On atorvastatin 80 mg PO daily        Subjective  Patient resting in bed. She is reporting gas pain. She does report SOB when returning from bathroom. No palpitations or chest pain. No dizziness or lightheaded.     Review of Systems   Constitutional: Positive for malaise/fatigue and weight gain. Negative for chills and fever.   HENT:  Negative for congestion.    Cardiovascular:  Positive for dyspnea on exertion. Negative for  "chest pain, leg swelling and orthopnea.   Respiratory:  Positive for shortness of breath.    Musculoskeletal:  Negative for falls.   Gastrointestinal:  Negative for bloating, nausea and vomiting.   Neurological:  Negative for dizziness and light-headedness.   Psychiatric/Behavioral:  Negative for altered mental status.    All other systems reviewed and are negative.      Objective:   Vitals: Blood pressure 100/62, pulse 72, temperature 98.1 °F (36.7 °C), temperature source Oral, resp. rate 16, height 5' 8\" (1.727 m), weight 96.3 kg (212 lb 3.2 oz), SpO2 91%.,       Body mass index is 32.26 kg/m².,     Systolic (24hrs), Av , Min:97 , Max:122     Diastolic (24hrs), Av, Min:55, Max:68      Intake/Output Summary (Last 24 hours) at 2024 0916  Last data filed at 2024 0900  Gross per 24 hour   Intake 1180 ml   Output 55 ml   Net 1125 ml     Weight (last 2 days)       Date/Time Weight    24 0551 96.3 (212.2)    24 0600 94.3 (207.89)    24 2101 94.4 (208.11)    24 1431 93.9 (207)    24 0640 94 (207.23)          Telemetry Review: No telemetry     Physical Exam  Constitutional:       General: She is not in acute distress.     Appearance: She is obese.   HENT:      Head: Normocephalic.      Mouth/Throat:      Mouth: Mucous membranes are moist.   Cardiovascular:      Rate and Rhythm: Normal rate and regular rhythm.      Pulses: Normal pulses.   Pulmonary:      Effort: Pulmonary effort is normal. No respiratory distress.      Comments: RA   Decreased with fine crackles in bases   Abdominal:      General: Bowel sounds are normal.      Palpations: Abdomen is soft.   Musculoskeletal:         General: No swelling. Normal range of motion.      Cervical back: Neck supple.   Skin:     General: Skin is warm and dry.      Capillary Refill: Capillary refill takes less than 2 seconds.   Neurological:      Mental Status: She is alert and oriented to person, place, and time.   Psychiatric:        " " Mood and Affect: Mood normal.       LABORATORY RESULTS      CBC with diff:   Results from last 7 days   Lab Units 05/09/24  0551 05/08/24  0612 05/07/24  0647   WBC Thousand/uL 22.47* 25.37* 16.82*   HEMOGLOBIN g/dL 9.3* 10.0* 11.7   HEMATOCRIT % 29.6* 32.3* 36.7   MCV fL 92 92 92   PLATELETS Thousands/uL 202 260 305   RBC Million/uL 3.22* 3.52* 3.99   MCH pg 28.9 28.4 29.3   MCHC g/dL 31.4 31.0* 31.9   RDW % 14.4 14.9 14.3   MPV fL 10.4 9.7 10.1   NRBC AUTO /100 WBCs  --  0 0       CMP:  Results from last 7 days   Lab Units 05/09/24  0551 05/08/24  0612 05/07/24  0647   POTASSIUM mmol/L 4.0 3.6 3.9   CHLORIDE mmol/L 101 99 103   CO2 mmol/L 24 25 24   BUN mg/dL 12 10 13   CREATININE mg/dL 0.72 0.79 0.79   CALCIUM mg/dL 8.9 8.7 9.5   AST U/L  --   --  23   ALT U/L  --   --  23   ALK PHOS U/L  --   --  44   EGFR ml/min/1.73sq m 87 78 78       BMP:  Results from last 7 days   Lab Units 05/09/24  0551 05/08/24  0612 05/07/24  0647   POTASSIUM mmol/L 4.0 3.6 3.9   CHLORIDE mmol/L 101 99 103   CO2 mmol/L 24 25 24   BUN mg/dL 12 10 13   CREATININE mg/dL 0.72 0.79 0.79   CALCIUM mg/dL 8.9 8.7 9.5       No results found for: \"NTBNP\"          Results from last 7 days   Lab Units 05/09/24  0551 05/08/24  0612   MAGNESIUM mg/dL 2.1 1.7*                     Results from last 7 days   Lab Units 05/07/24  1247   INR  1.08       Lipid Profile:   No results found for: \"CHOL\"  Lab Results   Component Value Date    HDL 45 (L) 04/22/2024    HDL 50 03/22/2024     Lab Results   Component Value Date    LDLCALC 70 04/22/2024    LDLCALC 236 (H) 03/22/2024     Lab Results   Component Value Date    TRIG 137 04/22/2024    TRIG 185 (H) 03/22/2024       Cardiac testing:   No results found for this or any previous visit.    No results found for this or any previous visit.    No results found for this or any previous visit.    No valid procedures specified.  No results found for this or any previous visit.        Meds/Allergies   all current " active meds have been reviewed and current meds:   Current Facility-Administered Medications   Medication Dose Route Frequency    acetaminophen (TYLENOL) tablet 650 mg  650 mg Oral Q6H PRN    albuterol (PROVENTIL HFA,VENTOLIN HFA) inhaler 2 puff  2 puff Inhalation Q6H PRN    amiodarone tablet 200 mg  200 mg Oral Daily With Breakfast    atorvastatin (LIPITOR) tablet 80 mg  80 mg Oral QPM    clopidogrel (PLAVIX) tablet 75 mg  75 mg Oral Daily    escitalopram (LEXAPRO) tablet 10 mg  10 mg Oral Daily    gabapentin (NEURONTIN) capsule 100 mg  100 mg Oral BID    heparin (porcine) 25,000 units in 0.45% NaCl 250 mL infusion (premix)  3-20 Units/kg/hr (Order-Specific) Intravenous Titrated    HYDROmorphone (DILAUDID) injection 0.5 mg  0.5 mg Intravenous Q3H PRN    insulin lispro (HumALOG/ADMELOG) 100 units/mL subcutaneous injection 1-6 Units  1-6 Units Subcutaneous TID AC    levothyroxine tablet 50 mcg  50 mcg Oral Daily    meclizine (ANTIVERT) tablet 25 mg  25 mg Oral Q8H PRN    metoprolol tartrate (LOPRESSOR) partial tablet 12.5 mg  12.5 mg Oral Q12H MONA    oxyCODONE (ROXICODONE) immediate release tablet 10 mg  10 mg Oral Q4H PRN    oxyCODONE (ROXICODONE) IR tablet 5 mg  5 mg Oral Q4H PRN    [START ON 5/10/2024] pantoprazole (PROTONIX) EC tablet 40 mg  40 mg Oral Daily Before Breakfast    piperacillin-tazobactam (ZOSYN) IVPB (EXTENDED INFUSION) 4.5 g  4.5 g Intravenous Q8H    polyethylene glycol (MIRALAX) packet 17 g  17 g Oral Daily    sodium phosphate 12 mmol in sodium chloride 0.9 % 100 mL Infusion  12 mmol Intravenous Once    sucralfate (CARAFATE) tablet 1 g  1 g Oral 4x Daily (AC & HS)    trimethobenzamide (TIGAN) IM injection 200 mg  200 mg Intramuscular Q6H PRN     Medications Prior to Admission   Medication    acetaminophen (TYLENOL) 650 mg CR tablet    albuterol (ProAir HFA) 90 mcg/act inhaler    amiodarone 200 mg tablet    apixaban (ELIQUIS) 5 mg    atorvastatin (LIPITOR) 80 mg tablet    clopidogrel (PLAVIX) 75 mg  tablet    Empagliflozin (Jardiance) 25 MG TABS    escitalopram (LEXAPRO) 10 mg tablet    furosemide (LASIX) 20 mg tablet    gabapentin (Neurontin) 100 mg capsule    levothyroxine (Synthroid) 50 mcg tablet    meclizine (ANTIVERT) 25 mg tablet    metoprolol succinate (TOPROL-XL) 25 mg 24 hr tablet    Multiple Vitamin (MULTIVITAMIN) capsule    pantoprazole (PROTONIX) 40 mg tablet    sacubitril-valsartan (Entresto) 24-26 MG TABS    sucralfate (CARAFATE) 1 g tablet       heparin (porcine), 3-20 Units/kg/hr (Order-Specific), Last Rate: 15.1 Units/kg/hr (05/09/24 0659)        Counseling / Coordination of Care  Total floor / unit time spent today 20 minutes.  Greater than 50% of total time was spent with the patient and / or family counseling and / or coordination of care.      ** Please Note: Dragon 360 Dictation voice to text software may have been used in the creation of this document. **

## 2024-05-09 NOTE — PROGRESS NOTES
"Progress Note - Vesna Langston 64 y.o. female MRN: [id number]    Unit/Bed#: S -01 Encounter: [id number]      Assessment:  Vesna Langston is a 64 y.o. female PMH PCI/NATHAN (3/24) on plavix/eliquis who p/w gangrenous appendicitis s/p laparoscopic appendectomy 5/8    Plan:  Diet as tolerated  Heparin gtt  Consider plavix resumption  F/u am labs  Strict I/O monitoring    Subjective:   Patient seen and examined bedside.  No overnight events. States that she did not sleep much. Endorses abdominal pain. No n/v/f/ch    Objective:     Vitals: Blood pressure 100/62, pulse 72, temperature 98.1 °F (36.7 °C), temperature source Oral, resp. rate 16, height 5' 8\" (1.727 m), weight 96.3 kg (212 lb 3.2 oz), SpO2 91%.,Body mass index is 32.26 kg/m².      Intake/Output Summary (Last 24 hours) at 5/9/2024 0836  Last data filed at 5/8/2024 1433  Gross per 24 hour   Intake 1000 ml   Output 55 ml   Net 945 ml       Physical Exam:   General - no acute distress, responsive and cooperative  CV - warm, regular rate  Pulm - normal work of breathing, no respiratory distress  Abd - soft, nondistended, incisions c/d/i  Neuro - m/s grossly intact, cn grossly intact  Ext - moving all extremities       Invasive Devices       Peripheral Intravenous Line  Duration             Peripheral IV 05/07/24 Left Antecubital 1 day    Peripheral IV 05/07/24 Right;Ventral (anterior) Hand 1 day                    Lab, Imaging and other studies: I have personally reviewed pertinent reports.    VTE Pharmacologic Prophylaxis: Heparin  VTE Mechanical Prophylaxis: sequential compression device   "

## 2024-05-10 ENCOUNTER — APPOINTMENT (INPATIENT)
Dept: RADIOLOGY | Facility: HOSPITAL | Age: 64
DRG: 341 | End: 2024-05-10
Payer: COMMERCIAL

## 2024-05-10 LAB
ANION GAP SERPL CALCULATED.3IONS-SCNC: 6 MMOL/L (ref 4–13)
APTT 1H NP PPP: 45 SECONDS (ref 24–36)
APTT IMM NP PPP: 40.7 SECONDS (ref 24–36)
APTT PPP: 63 SECONDS (ref 23–37)
APTT PPP: 65 SECONDS (ref 23–37)
APTT PPP: 85 SECONDS (ref 23–37)
BASOPHILS # BLD AUTO: 0.04 THOUSANDS/ÂΜL (ref 0–0.1)
BASOPHILS NFR BLD AUTO: 0 % (ref 0–1)
BUN SERPL-MCNC: 12 MG/DL (ref 5–25)
CALCIUM SERPL-MCNC: 8.7 MG/DL (ref 8.4–10.2)
CHLORIDE SERPL-SCNC: 103 MMOL/L (ref 96–108)
CO2 SERPL-SCNC: 29 MMOL/L (ref 21–32)
CREAT SERPL-MCNC: 0.7 MG/DL (ref 0.6–1.3)
DME PARACHUTE DELIVERY DATE REQUESTED: NORMAL
DME PARACHUTE ITEM DESCRIPTION: NORMAL
DME PARACHUTE ORDER STATUS: NORMAL
DME PARACHUTE SUPPLIER NAME: NORMAL
DME PARACHUTE SUPPLIER PHONE: NORMAL
EOSINOPHIL # BLD AUTO: 0.01 THOUSAND/ÂΜL (ref 0–0.61)
EOSINOPHIL NFR BLD AUTO: 0 % (ref 0–6)
ERYTHROCYTE [DISTWIDTH] IN BLOOD BY AUTOMATED COUNT: 14.6 % (ref 11.6–15.1)
GFR SERPL CREATININE-BSD FRML MDRD: 90 ML/MIN/1.73SQ M
GLUCOSE SERPL-MCNC: 117 MG/DL (ref 65–140)
GLUCOSE SERPL-MCNC: 118 MG/DL (ref 65–140)
GLUCOSE SERPL-MCNC: 144 MG/DL (ref 65–140)
GLUCOSE SERPL-MCNC: 176 MG/DL (ref 65–140)
HCT VFR BLD AUTO: 29.4 % (ref 34.8–46.1)
HGB BLD-MCNC: 9.1 G/DL (ref 11.5–15.4)
IMM GRANULOCYTES # BLD AUTO: 0.12 THOUSAND/UL (ref 0–0.2)
IMM GRANULOCYTES NFR BLD AUTO: 1 % (ref 0–2)
LYMPHOCYTES # BLD AUTO: 2.37 THOUSANDS/ÂΜL (ref 0.6–4.47)
LYMPHOCYTES NFR BLD AUTO: 13 % (ref 14–44)
MCH RBC QN AUTO: 28.6 PG (ref 26.8–34.3)
MCHC RBC AUTO-ENTMCNC: 31 G/DL (ref 31.4–37.4)
MCV RBC AUTO: 93 FL (ref 82–98)
MONOCYTES # BLD AUTO: 1.19 THOUSAND/ÂΜL (ref 0.17–1.22)
MONOCYTES NFR BLD AUTO: 7 % (ref 4–12)
NEUTROPHILS # BLD AUTO: 14.54 THOUSANDS/ÂΜL (ref 1.85–7.62)
NEUTS SEG NFR BLD AUTO: 79 % (ref 43–75)
NRBC BLD AUTO-RTO: 0 /100 WBCS
PLATELET # BLD AUTO: 214 THOUSANDS/UL (ref 149–390)
PMV BLD AUTO: 10.5 FL (ref 8.9–12.7)
POTASSIUM SERPL-SCNC: 4.3 MMOL/L (ref 3.5–5.3)
RBC # BLD AUTO: 3.18 MILLION/UL (ref 3.81–5.12)
SODIUM SERPL-SCNC: 138 MMOL/L (ref 135–147)
WBC # BLD AUTO: 18.27 THOUSAND/UL (ref 4.31–10.16)

## 2024-05-10 PROCEDURE — 99232 SBSQ HOSP IP/OBS MODERATE 35: CPT | Performed by: INTERNAL MEDICINE

## 2024-05-10 PROCEDURE — 74018 RADEX ABDOMEN 1 VIEW: CPT

## 2024-05-10 PROCEDURE — 82948 REAGENT STRIP/BLOOD GLUCOSE: CPT

## 2024-05-10 PROCEDURE — 99232 SBSQ HOSP IP/OBS MODERATE 35: CPT | Performed by: STUDENT IN AN ORGANIZED HEALTH CARE EDUCATION/TRAINING PROGRAM

## 2024-05-10 PROCEDURE — 85732 THROMBOPLASTIN TIME PARTIAL: CPT | Performed by: INTERNAL MEDICINE

## 2024-05-10 PROCEDURE — 97163 PT EVAL HIGH COMPLEX 45 MIN: CPT

## 2024-05-10 PROCEDURE — 85730 THROMBOPLASTIN TIME PARTIAL: CPT | Performed by: SURGERY

## 2024-05-10 PROCEDURE — 85025 COMPLETE CBC W/AUTO DIFF WBC: CPT | Performed by: SURGERY

## 2024-05-10 PROCEDURE — 80048 BASIC METABOLIC PNL TOTAL CA: CPT | Performed by: SURGERY

## 2024-05-10 PROCEDURE — 97166 OT EVAL MOD COMPLEX 45 MIN: CPT

## 2024-05-10 PROCEDURE — 85730 THROMBOPLASTIN TIME PARTIAL: CPT | Performed by: INTERNAL MEDICINE

## 2024-05-10 PROCEDURE — 99024 POSTOP FOLLOW-UP VISIT: CPT | Performed by: SURGERY

## 2024-05-10 PROCEDURE — 97116 GAIT TRAINING THERAPY: CPT

## 2024-05-10 RX ORDER — SIMETHICONE 80 MG
80 TABLET,CHEWABLE ORAL EVERY 6 HOURS PRN
Status: DISCONTINUED | OUTPATIENT
Start: 2024-05-10 | End: 2024-05-12 | Stop reason: HOSPADM

## 2024-05-10 RX ORDER — AMOXICILLIN AND CLAVULANATE POTASSIUM 875; 125 MG/1; MG/1
1 TABLET, FILM COATED ORAL EVERY 12 HOURS SCHEDULED
Status: DISCONTINUED | OUTPATIENT
Start: 2024-05-10 | End: 2024-05-10

## 2024-05-10 RX ORDER — CLOPIDOGREL BISULFATE 75 MG/1
75 TABLET ORAL ONCE
Status: COMPLETED | OUTPATIENT
Start: 2024-05-10 | End: 2024-05-10

## 2024-05-10 RX ORDER — FUROSEMIDE 10 MG/ML
20 INJECTION INTRAMUSCULAR; INTRAVENOUS ONCE
Status: COMPLETED | OUTPATIENT
Start: 2024-05-10 | End: 2024-05-10

## 2024-05-10 RX ADMIN — TRIMETHOBENZAMIDE HYDROCHLORIDE 200 MG: 100 INJECTION INTRAMUSCULAR at 08:48

## 2024-05-10 RX ADMIN — GABAPENTIN 100 MG: 100 CAPSULE ORAL at 18:08

## 2024-05-10 RX ADMIN — ATORVASTATIN CALCIUM 80 MG: 40 TABLET, FILM COATED ORAL at 18:08

## 2024-05-10 RX ADMIN — PANTOPRAZOLE SODIUM 40 MG: 40 TABLET, DELAYED RELEASE ORAL at 06:18

## 2024-05-10 RX ADMIN — ESCITALOPRAM OXALATE 10 MG: 10 TABLET ORAL at 08:26

## 2024-05-10 RX ADMIN — CLOPIDOGREL BISULFATE 75 MG: 75 TABLET ORAL at 10:53

## 2024-05-10 RX ADMIN — SUCRALFATE 1 G: 1 TABLET ORAL at 17:00

## 2024-05-10 RX ADMIN — AMOXICILLIN AND CLAVULANATE POTASSIUM 1 TABLET: 875; 125 TABLET, FILM COATED ORAL at 08:26

## 2024-05-10 RX ADMIN — INSULIN LISPRO 1 UNITS: 100 INJECTION, SOLUTION INTRAVENOUS; SUBCUTANEOUS at 17:09

## 2024-05-10 RX ADMIN — CLOPIDOGREL BISULFATE 75 MG: 75 TABLET ORAL at 08:26

## 2024-05-10 RX ADMIN — ACETAMINOPHEN 650 MG: 325 TABLET, FILM COATED ORAL at 19:00

## 2024-05-10 RX ADMIN — AMIODARONE HYDROCHLORIDE 200 MG: 200 TABLET ORAL at 08:26

## 2024-05-10 RX ADMIN — SUCRALFATE 1 G: 1 TABLET ORAL at 06:18

## 2024-05-10 RX ADMIN — OXYCODONE HYDROCHLORIDE 5 MG: 5 TABLET ORAL at 08:36

## 2024-05-10 RX ADMIN — LEVOTHYROXINE SODIUM 50 MCG: 50 TABLET ORAL at 08:26

## 2024-05-10 RX ADMIN — ACETAMINOPHEN 650 MG: 325 TABLET, FILM COATED ORAL at 08:36

## 2024-05-10 RX ADMIN — GABAPENTIN 100 MG: 100 CAPSULE ORAL at 08:26

## 2024-05-10 RX ADMIN — SUCRALFATE 1 G: 1 TABLET ORAL at 21:13

## 2024-05-10 RX ADMIN — HEPARIN SODIUM 17.1 UNITS/KG/HR: 10000 INJECTION, SOLUTION INTRAVENOUS at 14:58

## 2024-05-10 RX ADMIN — PIPERACILLIN SODIUM AND TAZOBACTAM SODIUM 4.5 G: 36; 4.5 INJECTION, POWDER, LYOPHILIZED, FOR SOLUTION INTRAVENOUS at 21:13

## 2024-05-10 RX ADMIN — OXYCODONE HYDROCHLORIDE 10 MG: 10 TABLET ORAL at 00:09

## 2024-05-10 RX ADMIN — SUCRALFATE 1 G: 1 TABLET ORAL at 12:32

## 2024-05-10 RX ADMIN — POLYETHYLENE GLYCOL 3350 17 G: 17 POWDER, FOR SOLUTION ORAL at 08:26

## 2024-05-10 RX ADMIN — OXYCODONE HYDROCHLORIDE 5 MG: 5 TABLET ORAL at 19:00

## 2024-05-10 RX ADMIN — FUROSEMIDE 20 MG: 10 INJECTION, SOLUTION INTRAMUSCULAR; INTRAVENOUS at 08:48

## 2024-05-10 RX ADMIN — PIPERACILLIN SODIUM AND TAZOBACTAM SODIUM 4.5 G: 36; 4.5 INJECTION, POWDER, LYOPHILIZED, FOR SOLUTION INTRAVENOUS at 13:00

## 2024-05-10 RX ADMIN — Medication 12.5 MG: at 08:26

## 2024-05-10 NOTE — OCCUPATIONAL THERAPY NOTE
Occupational Therapy Evaluation     Patient Name: Vesna Langston  Today's Date: 5/10/2024  Problem List  Principal Problem:    Acute appendicitis with localized peritonitis and gangrene, without perforation or abscess  Active Problems:    Atrial flutter, paroxysmal ([facility])    Hyperlipemia    Ischemic cardiomyopathy    Chronic low back pain    HFrEF (heart failure with reduced ejection fraction) ([facility])    Coronary artery disease involving native coronary artery of native heart    Acquired hypothyroidism    Generalized anxiety disorder    Acute pulmonary embolism without acute cor pulmonale ([facility])    History of gastric ulcer    Past Medical History  Past Medical History:   Diagnosis Date    Acute respiratory insufficiency 03/22/2024    Allergic     Chronic HFrEF (heart failure with reduced ejection fraction) ([facility])     Coronary artery disease     COVID-19 virus infection 11/28/2022    Diabetes mellitus ([facility])     Disease of thyroid gland 4/09/2024    Hyperlipidemia     Hypoglycemia     ICD (implantable cardioverter-defibrillator) in place     Ischemic cardiomyopathy     Lactic acidosis 03/22/2024    Myocardial infarction ([facility]) 3/22/2024    Otitis media 1966    ST elevation myocardial infarction involving left anterior descending (LAD) coronary artery ([facility]) 03/22/2024    Tobacco abuse     Visual impairment 1967     Past Surgical History  Past Surgical History:   Procedure Laterality Date    ADENOIDECTOMY      APPENDECTOMY LAPAROSCOPIC N/A 5/8/2024    Procedure: APPENDECTOMY LAPAROSCOPIC;  Surgeon: Lauryn Ullrich, DO;  Location: [facility];  Service: General    CARDIAC CATHETERIZATION N/A 03/22/2024    Procedure: Cardiac pci;  Surgeon: Gabriel Myers MD;  Location: [facility];  Service: Cardiology    CARDIAC CATHETERIZATION N/A 03/22/2024    Procedure: Cardiac Coronary Angiogram;  Surgeon: Gabriel Myers MD;  [facility];  Service: Cardiology    CARDIAC CATHETERIZATION N/A 03/22/2024    Procedure:  Cardiac PCI AMI;  Surgeon: Gabriel Myers MD;  [facility];  Service: Cardiology    CARDIAC CATHETERIZATION N/A 2024    Procedure: Cardiac IVUS;  Surgeon: Gabriel Myers MD;  [facility];  Service: Cardiology    CARDIAC ELECTROPHYSIOLOGY PROCEDURE N/A 2024    Procedure: Cardiac icd implant;  Surgeon: Jeffy Lama MD;  [facility];  Service: Cardiology     SECTION      ECTOPIC PREGNANCY SURGERY      SEPTOPLASTY      SPINE SURGERY  23    TONSILLECTOMY           05/10/24 0930   OT Last Visit   OT Visit Date 05/10/24   Note Type   Note type Evaluation   Pain Assessment   Pain Assessment Tool 0-10   Pain Score 6   Pain Location/Orientation Location: Abdomen   Restrictions/Precautions   Weight Bearing Precautions Per Order No   Other Precautions Chair Alarm;Bed Alarm;Multiple lines;Telemetry;Fall Risk  (PCI placed 3/27, masvolodymyr)   Home Living   Type of Home House  (split level)   Home Layout Stairs to enter with rails;Multi-level  ((0 AYESHA through back. 6+6 AYESHA))   Bathroom Shower/Tub Walk-in shower   Bathroom Toilet Raised   Bathroom Equipment Other (Comment)  (ha shower stool but reports it is at her house (going to daughters at D/C), initially inquired about assitance getting a new one, but at end of session ordered one on [employer])   Bathroom Accessibility Accessible   Home Equipment Cane;Walker  (SPC used at baseline)   Additional Comments has been staing at daughters house recently- home set up provided above.   Prior Function   Level of [place] Independent with ADLs   Lives With   (temporarily staying c daughter)   Receives Help From Outpatient therapy;Family  (active with OP PT with low back pain. starting cardiac rehab this week)   IADLs Independent with driving;Independent with meal prep;Independent with medication management  (reports has been cleared to drive. shares home management tasks c daughters)   Falls in the last 6 months (S)   ">10  (reports 2 falls since recent D/C d/t LLE \"stopping working\". in prior therapy eval 4/2024, pt reporting 10 falls.)   Vocational Workers comp  ([employer] but has been on workers comp)   Lifestyle   Autonomy At baseline pt is (I) with ADLs, light A with IADLs. Mod (I) c SPC. + fall hx, + driving   Reciprocal Relationships supportive daughter   Service to Others grandson , daughter   Intrinsic Gratification enjoys OP therapy.   General   Additional Pertinent History Pt admitted d/t acute appendicitis c localized peritonitis gangrene,s/p laparoscopic appendectomy 5/8. Hx of  history of recent STEMI s/p PCI  implanted by Dr. Lama on 3/27/2024   Family/Caregiver Present No   Subjective   Subjective Pt agreeable to OT session   ADL   Where Assessed Standing at sink  (toilet, recliner)   Eating Assistance 7  Independent   Grooming Assistance 7  Independent   Grooming Deficit   (stood at sink to wash hands)   UB Bathing Assistance 6  Modified Independent   LB Bathing Assistance 5  Supervision/Setup   UB Dressing Assistance 6  Modified independent   LB Dressing Assistance 4  Minimal Assistance   LB Dressing Deficit   (pt declines trialling, but expresses needing light assistance from nursing staff.)   Toileting Assistance  6  Modified independent   Toileting Deficit Setup;Grab bar use  (pericare and clothing management (I))   Functional Assistance 5  Supervision/Setup   Additional Comments Pt primarily limited during LB ADLs by abdominal pain   Bed Mobility   Supine to Sit 6  Modified independent   Additional items Bedrails   Sit to Supine   (seated OOB in recliner at end of session)   Additional Comments Denies lightheaded/dizziness c positional changes   Transfers   Sit to Stand 5  Supervision   Additional items Increased time required;Armrests   Stand to Sit 5  Supervision   Additional items Increased time required;Armrests   Toilet transfer 5  Supervision   Additional items Increased time required;Standard toilet  (R " GB)   Additional Comments RW used during transfers, cues for hand placements with fair carryover.   Functional Mobility   Functional Mobility 5  Supervision   Additional Comments household distance x 2 trials. Denies lightheaded/dizziness. Spo2 86-91% on RA, no SOB noted. RN made aware   Additional items Rolling walker   Balance   Static Sitting Good   Dynamic Sitting Good   Static Standing Fair   Dynamic Standing Fair -   Activity Tolerance   Activity Tolerance Patient limited by fatigue;Patient limited by pain   Medical Staff Made Aware ROSANNE Kearns   Nurse Made Aware RN Yanique pre/post   RUE Assessment   RUE Assessment WFL  (grossly 4/5 based on functional assessment)   LUE Assessment   LUE Assessment WFL  (grossly 4/5 based on functional assessment)   Hand Function   Gross Motor Coordination Functional   Fine Motor Coordination Functional   Sensation   Light Touch Partial deficits in the LLE  (baseline , no acute changes)   Vision-Basic Assessment   Current Vision Wears glasses all the time   Cognition   Overall Cognitive Status WFL   Arousal/Participation Alert;Cooperative   Attention Within functional limits   Orientation Level Oriented X4   Memory Within functional limits   Following Commands Follows multistep commands without difficulty   Comments Pt agreeable to OT session   Assessment   Limitation Decreased ADL status;Decreased endurance;Decreased self-care trans;Decreased high-level ADLs   Prognosis Good   Assessment Patient is a 64 y.o. female seen for OT evaluation at [facility] following admission on 5/7/2024  s/p Acute appendicitis with localized peritonitis and gangrene, without perforation or abscess. Please see above for comprehensive list of comorbidities and significant PMHx impacting functional performance.  Upon initial evaluation, pt appears to be performing near baseline functional status.   Occupational performance is affected by the following deficits: decreased balance ,  decreased functional reach , decreased activity tolerance , impaired sensation , and (+) pain . Personal/Environmental factors impacting D/C include: (+) Hx of falls  and Assistance needed for ADLs. Supporting factors include: able to maintain FFSU and support system available Patient would benefit from OT services within the acute care setting to maximize level of functional independence in the following areas ADLs, energy conservation  From OT standpoint, recommendation at time of D/C would be level 4: no rehabilitation needs .   Goals   Patient Goals to return home in time for her grandsons 6th birthday party tomorrow, to return to [facility]   Plan   Treatment Interventions ADL retraining;Functional transfer training;Endurance training;Patient/family training;Equipment evaluation/education;Energy conservation;Compensatory technique education   Goal Expiration Date 05/20/24   OT Treatment Day 0   OT Frequency 1-2x/wk   Discharge Recommendation   Rehab Resource Intensity Level, OT No post-acute rehabilitation needs   AM-PAC Daily Activity Inpatient   Lower Body Dressing 3   Bathing 3   Toileting 3   Upper Body Dressing 4   Grooming 4   Eating 4   Daily Activity Raw Score 21   Daily Activity Standardized Score (Calc for Raw Score >=11) 44.27   AM-PAC Applied Cognition Inpatient   Following a Speech/Presentation 4   Understanding Ordinary Conversation 4   Taking Medications 4   Remembering Where Things Are Placed or Put Away 4   Remembering List of 4-5 Errands 4   Taking Care of Complicated Tasks 4   Applied Cognition Raw Score 24   Applied Cognition Standardized Score 62.21   End of Consult   Education Provided Yes   Patient Position at End of Consult Bedside chair;Bed/Chair alarm activated;All needs within reach   Nurse Communication Nurse aware of consult   End of Consult Comments The patient's raw score on the AM-PAC Daily Activity Inpatient Short Form is 21. A raw score of greater than or equal to 19 suggests the  patient may benefit from discharge to home. Please refer to the recommendation of the Occupational Therapist for safe discharge planning.   Goals established on initial evaluation in order to achieve pt's goal of returning home in time for her grandsons 6th birthday party tomorrow, returning to PLOF        Pt will complete LB ADLs Mod independent   for increased ADL independence within 10 days.     Pt will complete toileting Mod independent  in bathroom for increased ADL independence within 10 days.     Pt will demonstrate proper body mechanics to complete self-care transfers and functional mobility with Mod independent  and use of LRAD for increased safety and functional independence within 10 days.     Pt will demonstrate proper body mechanics and fall prevention strategies during 100% of tx sessions for increased safety awareness during ADL/IADLs    Pt will demonstrate activity tolerance of 45 min in therapeutic tasks for increased participation in meaningful activities upon D/C.    Pt will verbalize and demonstrate understanding of energy conservation/deep breathing techniques for increased activity tolerance and endurance during meaningful activities.     Pt will demonstrate OOB sitting tolerance of 2-4 hr/day for increased activity tolerance and engagement in leisure activities within 10 days.       Melly Hernandez, OT

## 2024-05-10 NOTE — CASE MANAGEMENT
Case Management Discharge Planning Note    Patient name Vesna COLE /S -01 MRN [id number]  : 1960 Date 5/10/2024       Current Admission Date: 2024  Current Admission Diagnosis:Acute appendicitis with localized peritonitis and gangrene, without perforation or abscess   Patient Active Problem List    Diagnosis Date Noted    Acute appendicitis with localized peritonitis and gangrene, without perforation or abscess 2024    Acute pulmonary embolism without acute cor pulmonale ([facility]) 2024    History of gastric ulcer 2024    Viral upper respiratory tract infection 2024    Generalized anxiety disorder 2024    Anemia due to acute blood loss 2024    Acquired hypothyroidism 2024    Constipation 04/10/2024    Coffee ground emesis 2024    Type 2 diabetes mellitus, without long-term current use of insulin ([facility]CC) 2024    Syncope 2024    Guaiac positive stools 2024    S/P ICD (internal cardiac defibrillator) procedure 2024    Chronic low back pain 2024    HFrEF (heart failure with reduced ejection fraction) ([facility]) 2024    Ischemic cardiomyopathy 2024    Coronary artery disease involving native coronary artery of native heart 2024    S/P coronary artery stent placement 2024    Atrial flutter, paroxysmal ([facility]) 2024    Hyperlipemia 2024    New onset type 2 diabetes mellitus (HCC) 2024    Tobacco abuse 2024    History of sustained ventricular tachycardia 2024    Symptoms of upper respiratory infection ([facility]) 2023    Back pain 2022    Sciatica of left side 2022      LOS (days): 3  Geometric Mean LOS (GMLOS) (days):   Days to GMLOS:     OBJECTIVE:  Risk of Unplanned Readmission Score: 20.72         Current admission status: Inpatient   Preferred Pharmacy:   [id number] [place], [place] - [address] [place] [id number]  Phone: [phone] Fax: [phone]    [id number] - [place] - [address]  [address] [address]  Phone: [phone] Fax: [phone]    Primary Care Provider: Brandon Pete MD    Primary Insurance: BLUE CROSS  Secondary Insurance: MEDICARE    DISCHARGE DETAILS:    Discharge planning discussed with:: patient  Freedom of Choice: Yes  Comments - Freedom of Choice: CM f/u with pt re: PT/OT recs for continued OPPT/OT, cardiac rehab. Patient aware of same. Pt relayed need for RW - order placed to [facility] (pt choice), currently in processing. CM to continue to follow for RW delivery once confirmed.                          DME Referral Provided  Referral made for DME?: Yes  DME referral completed for the following items:: [facility]  DME Supplier Name:: [employer]                                               IMM reviewed with patient, patient agrees with discharge determination.  IMM Given (Date):: 05/10/24  IMM Given to:: Patient

## 2024-05-10 NOTE — PHYSICAL THERAPY NOTE
PHYSICAL THERAPY EVALUATION  NAME:  Vesna Langston  DATE: 05/10/24    AGE:   64 y.o.  Mrn:   [id number]  Principal problem: Principal Problem:    Acute appendicitis with localized peritonitis and gangrene, without perforation or abscess  Active Problems:    Atrial flutter, paroxysmal (HCC)    Hyperlipemia    Ischemic cardiomyopathy    Chronic low back pain    HFrEF (heart failure with reduced ejection fraction) (HCC)    Coronary artery disease involving native coronary artery of native heart    Acquired hypothyroidism    Generalized anxiety disorder    Acute pulmonary embolism without acute cor pulmonale (HCC)    History of gastric ulcer      Vitals:    05/09/24 2045 05/10/24 0600 05/10/24 0742 05/10/24 1533   BP: 122/73  113/67 123/74   BP Location:   Right arm    Pulse: 85  83 76   Resp: 18  18 18   Temp: 98.6 °F (37 °C)  99.3 °F (37.4 °C) 98.4 °F (36.9 °C)   TempSrc:   Oral    SpO2: (!) 86%  92% 92%   Weight:  96.9 kg (213 lb 10 oz)     Height:           Length Of Stay: 3  Performed at least 2 patient identifiers during session: Name and Birthday  PHYSICAL THERAPY EVALUATION :    05/10/24 1444   PT Last Visit   PT Visit Date 05/10/24   Note Type   Note type Evaluation   Pain Assessment   Pain Assessment Tool 0-10   Pain Score 6   Pain Location/Orientation Location: Abdomen   Effect of Pain on Daily Activities limits speed and indep of mobility maira transitional movement   Patient's Stated Pain Goal No pain   Hospital Pain Intervention(s) Cold applied;Ambulation/increased activity;Emotional support  (ICE pack provided @ end of session)   Restrictions/Precautions   Weight Bearing Precautions Per Order No   Other Precautions Chair Alarm;Bed Alarm;Multiple lines;Telemetry;Fall Risk;Pain   Home Living   Type of Home House   Home Layout Multi-level  ((0 AYESHA through back. 6+6 AYESHA))   Home Equipment Cane  (SPC used at baseline)   Additional Comments has been staing at [facility] recently- home set up provided above.     "Pt reports her own walker is @ her home 1.5 hours away   Prior Function   Level of [place] Independent with ADLs;Independent with functional mobility   Lives With   (temporarily staying w/ daughter)   Receives Help From Outpatient therapy;Family  (active with OP PT with low back pain (for years) which she reports is from \"workman's Comp\". Pt scheduled to start cardiac rehab this week)   IADLs Independent with driving;Independent with meal prep;Independent with medication management  (reports has been cleared to drive. shares home management tasks c daughters)   Falls in the last 6 months >10  (Pt consistent w/OT reports of having 2 falls since recent D/C d/t LLE \"stopping working\".  In prior therapy eval 4/2024, pt reporting 10 falls.)   Vocational Workers comp  ([employer] but has been on workers comp)   Comments Patient reports that she was \"exercising her leg\" with performing stairs (with good and bad leg rolls reversed) when she had her last falls.  Patient also reports being up a small stepladder since her procedure   General   Family/Caregiver Present No   Cognition   Overall Cognitive Status WFL   Arousal/Participation Alert   Attention Within functional limits   Memory Within functional limits   Following Commands Follows one step commands with increased time or repetition  (With MMT.)   Subjective   Subjective In room, patient was ambulating in the halls with unilateral support of IV pole and nothing in other hand.  Denies lightheadedness.  Agreeable to participate   RUE Assessment   RUE Assessment WFL   LUE Assessment   LUE Assessment WFL   Strength RLE   R Hip Flexion 4/5   R Knee Extension 4+/5   R Ankle Dorsiflexion 4/5  (great toe ext 5)   Strength LLE   L Hip Flexion   (tested to 3 due to pt's apprehension /anticipated pain)   L Knee Extension 4/5   L Ankle Dorsiflexion 4-/5  (great toe ext 5)   L Knee Flexion   (submaximal effort on first attempt. Pt able to tolerate mod resistance while in sitting " "(gravity A) position)   Vision-Basic Assessment   Current Vision Wears glasses all the time   Coordination   Movements are Fluid and Coordinated 1   Light Touch   RLE Light Touch Grossly intact   LLE Light Touch Grossly intact  (self reported)   Bed Mobility   Supine to Sit Unable to assess  (as Pt OOB upon entering room)   Transfers   Sit to Stand 6  Modified independent   Additional items Increased time required;Armrests   Stand to Sit 6  Modified independent   Additional items Increased time required;Armrests   Ambulation/Elevation   Gait pattern Step through pattern;Excessively slow;Step to  (no uncorrected losses of balance)   Gait Assistance 5  Supervision   Additional items Assist x 1   Assistive Device   (IV pole for RUE unilateral support. Pt confirmed that she planned to use rolling walker but cannot use walker and push pole @ same time)   Distance Pt performed > 100'; witnessed 80' of that trial. Performed second gait trial x20'  (Comfortable gait speed .44 m/s to .40 m/s on 2 attempts)   Ambulation/Elevation Additional Comments (S)  Pt reports being told by \"someone\" that she should not let her ambulation heart rate greater than 110 bpm   Balance   Static Sitting Good   Static Standing Fair +   Ambulatory Fair   Endurance Deficit   Endurance Deficit Yes   Endurance Deficit Description Self-reported fatigue.  Limited ambulation distance.   Activity Tolerance   Activity Tolerance Patient limited by fatigue;Patient limited by pain   Medical Staff Made Aware Spoke to Urmila from case management, Melly from OT.   Nurse Made Aware Spoke to Yanique LEW before, during, after session     Assessment:   Pt is a 64 y.o. female seen for PT evaluation s/p admit to [facility] on 5/7/2024 w/ Acute appendicitis with localized peritonitis and gangrene, without perforation or abscess. Additionally, Pt has recent of STEMI s/p PCI  implanted by Dr. Lama on 3/27/2024.  Order placed for PT.      Prior to admission: Pt " has been temporarily staying with her daughter in a bilevel home, using SPC prior to this admission.  Reports having 2 recent falls when trying to follow therapies recommendations to strengthen her leg and use LLE to ascend and RLE to descend.  Patient had been seeing OP PT, and was scheduled for cardiac rehab within the week.  Upon evaluation: Pt needed no physical assistance for transfers, ambulation with unilateral support of IV pole.      However, Pt's clinical presentation is currently unstable/unpredictable given the functional mobility deficits above, especially (but not limited to) weakness, gait deviations, and pain, and combined with medical complications including abnormal H&H, abnormal WBCs, and readmission to hospital.  Pt IS NOT at her mobility baseline.  She is at risk for falls based on hx of falls, impaired balance, and obesity.       During this admission, pt would benefit from continued skilled inpatient PT in the acute care setting in order to address deficits as defined above to maximize function and mobility.      Recommendations:    From a PT standpoint, recommend next several sessions focus on continued mobility training preferably with a unilateral device 1 side when having the IV pole and the other, versus rolling walker.  Instructed patient to continue amb trials more frequent trials in halls for less distances and pacing.     Prognosis Fair   Problem List Decreased strength;Decreased endurance;Impaired balance;Decreased mobility;Obesity;Pain  (gait deviations)   Barriers to Discharge Inaccessible home environment;Decreased caregiver support   Goals   Patient Goals To return home, to return to PLOF, get back to OPPT and start cardiac rehab   STG Expiration Date 05/20/24   Short Term Goal #1 Goals: Pt will: Perform rolling  and supine<>sit bed mobility tasks with modified I to prepare for transfers and reposition in bed. Perform ambulation with LRAD for  at least 100' consistently with  "modified I to increase Indep in home environment and promote proper use of assistive device. Perform 6 stairs w/ railing +/- DME and w/Supervision to return to home with AYESHA, return to multilevel home, and with more conservative sequencing to minimize falls . Increase gait speed to at least .50 m/s while using DME to demonstrate less risk for falls   PT Treatment Day 1   Plan   Treatment/Interventions Functional transfer training;LE strengthening/ROM;Elevations;Therapeutic exercise;Equipment eval/education;Bed mobility;Gait training;Patient/family training;Endurance training;Spoke to nursing;Spoke to case management;OT   PT Frequency 2-3x/wk   Discharge Recommendation   Rehab Resource Intensity Level, PT III (Minimum Resource Intensity)   Equipment Recommended (S)    (Cane @ least (which pt reports she has). Pt reports that she would like another walker for discharge for community mobility as her walker is in her home environment which is now in a skilled nursing.)   Additional Comments 2 Personal factors affecting pt at time of IE include: steps to enter environment, multi-level environment, past experience, inability to navigate community distances, limited insight into impairments, and recent fall(s).   AM-PAC Basic Mobility Inpatient   Turning in Flat Bed Without Bedrails 4   Lying on Back to Sitting on Edge of Flat Bed Without Bedrails 4   Moving Bed to Chair 4   Standing Up From Chair Using Arms 4   Walk in Room 3   Climb 3-5 Stairs With Railing 3   Basic Mobility Inpatient Raw Score 22   Basic Mobility Standardized Score 47.4   [facility] Highest Level Of Mobility   -HLM Goal 7: Walk 25 feet or more   -HLM Achieved 7: Walk 25 feet or more   Additional Treatment Session   Start Time 1500   End Time 1515   Treatment Assessment Pt verbalized understanding of training w/ performing stairs in \"traditional technique\" for step to pattern maira while she is still in recovery from her PCI and on blood thinners. " "Additionally she was able to amb w/SPC + IV pole w/o difficulty and perform wooden curb step w/UE support of rail and cane. Skilled PT recommended to progress pt toward treatment goals.   Equipment Use Loaner SPC  set for pt's height   Additional Treatment Day 1   Exercises   Neuro re-ed Transfers modified I, amb w/SPC plus IV for 30' w/o physical A. Then performed 8\" curb step w/unilateral rail and SPC with prophylactic steadying support (but not needed) for 10 reps total. Verbal instructions to perform typical sequencing for ascend (forward) and descend (backward) @ LEs. Education to pt for following hystorical sequencing.   End of Consult   Patient Position at End of Consult All needs within reach;Seated edge of bed  (nursing present w/ pt stating that she can perform BTB w/o A.)   (Please find full objective findings from PT assessment regarding body systems outlined above).     The patient's -[facility] Basic Mobility Inpatient Short Form Raw Score is 22. A Raw score of greater than 16 suggests the patient may benefit from discharge to home, which DOES coincide with CURRENT above PT recommendations.     However please refer to therapist recommendation for discharge planning given other factors that may influence destination.     Adapted from Allen CHIN, Prashant J, Montez J, Scott J. [employer] [facility] “6-Clicks” Basic Mobility and Daily Activity Scores With Discharge Destination. Physical Therapy, 2021;101:1-9. DOI: [email or web address]/[id number]    Portions of the record may have been created with voice recognition software.  Occasional wrong word or \"sound a like\" substitutions may have occurred due to the inherent limitations of voice recognition software.  Read the chart carefully and recognize, using context, where substitutions have occurred    "

## 2024-05-10 NOTE — PROGRESS NOTES
Progress Note - Cardiology   Vesna Langston 64 y.o. female MRN: [id number]  Unit/Bed#: S -01 Encounter: [id number]    Assessment:  Principal Problem:    Acute appendicitis with localized peritonitis and gangrene, without perforation or abscess  Active Problems:    Atrial flutter, paroxysmal (HCC)    Hyperlipemia    Ischemic cardiomyopathy    Chronic low back pain    HFrEF (heart failure with reduced ejection fraction) (HCC)    Coronary artery disease involving native coronary artery of native heart    Acquired hypothyroidism    Generalized anxiety disorder    Acute pulmonary embolism without acute cor pulmonale (HCC)    History of gastric ulcer    64-year-old female with a cardiac history of coronary artery disease with most recent PCI in March with VT at that time. ICD placed. Ischemic cardiomyopathy with an ejection fraction 30%. Paroxysmal atrial fibrillation/flutter. Presented with acute appendicitis. Now status post appendectomy 5/8.    Plan:  Coronary artery disease:  She immediately vomited up the Plavix she took this morning. A repeat one-time dose was ordered. She is not having any bleeding issues. Her hemoglobin is stable. Currently without any angina. Recent PCI so continue antiplatelet agent. If she has n.p.o. status necessary or NG tube, we need to come up with alternative.    Ischemic cardiomyopathy:  Ejection fraction 30 to 35%. She received oral Lasix yesterday. She still looks overloaded. Give IV Lasix today 20 mg. Plan was to transition her to Entresto, but hold off on this. Continue metoprolol.    Ventricular tachycardia:  No recent episodes noted here. ICD is in place. Continue amiodarone, metoprolol.    Paroxysmal atrial fibrillation:  In sinus rhythm. Amiodarone, metoprolol. On anticoagulation currently with heparin infusion. Typically on Eliquis.    PE:  Anticoagulation as above.        Subjective/Objective     Subjective:   Patient reports good urination with the oral Lasix she received  "yesterday, but she still has rales present. She was nauseous this morning and immediately vomited up the pills she had just taken in the a.m.        Objective:    Vitals: /67 (BP Location: Right arm)   Pulse 83   Temp 99.3 °F (37.4 °C) (Oral)   Resp 18   Ht 5' 8\" (1.727 m)   Wt 96.9 kg (213 lb 10 oz)   SpO2 92%   BMI 32.48 kg/m²   Vitals:    05/09/24 0551 05/10/24 0600   Weight: 96.3 kg (212 lb 3.2 oz) 96.9 kg (213 lb 10 oz)     Orthostatic Blood Pressures      Flowsheet Row Most Recent Value   Blood Pressure 113/67 filed at 05/10/2024 0742   Patient Position - Orthostatic VS Sitting filed at 05/10/2024 0742              Intake/Output Summary (Last 24 hours) at 5/10/2024 1520  Last data filed at 5/10/2024 0930  Gross per 24 hour   Intake 0 ml   Output 450 ml   Net -450 ml     Physical Exam:   General appearance: alert and in no acute distress  Head: Normocephalic, without obvious abnormality, atraumatic  Neck: no carotid bruit, no JVD and supple, symmetrical, trachea midline  Lungs: rales bilateral bases  Heart: S1, S2 normal and no S3 or S4. No murmurs.  Abdomen: soft, non-tender; bowel sounds normal; no masses,  no organomegaly  Extremities: extremities normal, atraumatic, no cyanosis or edema  Pulses: 2+ and symmetric bilaterally  Skin: Skin color, texture, turgor normal. No rashes or lesions  Neurologic: Grossly normal. Alert and oriented.    Medications:    Current Facility-Administered Medications:     acetaminophen (TYLENOL) tablet 650 mg, 650 mg, Oral, Q6H PRN, Jason Abraham MD, 650 mg at 05/10/24 0836    albuterol (PROVENTIL HFA,VENTOLIN HFA) inhaler 2 puff, 2 puff, Inhalation, Q6H PRN, Jason Abraham MD    amiodarone tablet 200 mg, 200 mg, Oral, Daily With Breakfast, Jason Abraham MD, 200 mg at 05/10/24 0826    atorvastatin (LIPITOR) tablet 80 mg, 80 mg, Oral, QPM, Jason Abraham MD, 80 mg at 05/09/24 1723    clopidogrel (PLAVIX) tablet 75 mg, 75 mg, Oral, Daily, Yina" EVON Gunn, 75 mg at 05/10/24 0826    escitalopram (LEXAPRO) tablet 10 mg, 10 mg, Oral, Daily, Yina Gunn PA-C, 10 mg at 05/10/24 0826    gabapentin (NEURONTIN) capsule 100 mg, 100 mg, Oral, BID, Yina Gunn PA-C, 100 mg at 05/10/24 0826    heparin (porcine) 25,000 units in 0.45% NaCl 250 mL infusion (premix), 3-20 Units/kg/hr (Order-Specific), Intravenous, Titrated, Billy Colin DO, Last Rate: 15.4 mL/hr at 05/10/24 1458, 17.1 Units/kg/hr at 05/10/24 1458    HYDROmorphone (DILAUDID) injection 0.5 mg, 0.5 mg, Intravenous, Q3H PRN, Jason Abraham MD, 0.5 mg at 05/07/24 1634    insulin lispro (HumALOG/ADMELOG) 100 units/mL subcutaneous injection 1-6 Units, 1-6 Units, Subcutaneous, TID AC, 2 Units at 05/09/24 1700 **AND** Fingerstick Glucose (POCT), , , TID AC, Jason Abraham MD    levothyroxine tablet 50 mcg, 50 mcg, Oral, Daily, Yina Gunn PA-C, 50 mcg at 05/10/24 0826    meclizine (ANTIVERT) tablet 25 mg, 25 mg, Oral, Q8H PRN, Yina Gunn PA-C    metoprolol tartrate (LOPRESSOR) partial tablet 12.5 mg, 12.5 mg, Oral, Q12H MONA, Jason Abraham MD, 12.5 mg at 05/10/24 0826    oxyCODONE (ROXICODONE) immediate release tablet 10 mg, 10 mg, Oral, Q4H PRN, Jason Abraham MD, 10 mg at 05/10/24 0009    oxyCODONE (ROXICODONE) IR tablet 5 mg, 5 mg, Oral, Q4H PRN, Jason Abraham MD, 5 mg at 05/10/24 0836    pantoprazole (PROTONIX) EC tablet 40 mg, 40 mg, Oral, Daily Before Breakfast, Billy Colin DO, 40 mg at 05/10/24 0618    piperacillin-tazobactam (ZOSYN) 4.5 g in sodium chloride 0.9 % 100 mL IV LOADING DOSE, 4.5 g, Intravenous, Once, Held at 05/10/24 0937 **FOLLOWED BY** piperacillin-tazobactam (ZOSYN) 4.5 g in sodium chloride 0.9 % 100 mL IVPB (EXTENDED INFUSION), 4.5 g, Intravenous, Q8H, Yina Gunn PA-C, Last Rate: 25 mL/hr at 05/10/24 1300, 4.5 g at 05/10/24 1300    simethicone (MYLICON) chewable tablet 80 mg, 80 mg, Oral, Q6H PRN, Yina Gunn PA-C     sucralfate (CARAFATE) tablet 1 g, 1 g, Oral, 4x Daily (AC & HS), Yina Gunn PA-C, 1 g at 05/10/24 1232    trimethobenzamide (TIGAN) IM injection 200 mg, 200 mg, Intramuscular, Q6H PRN, Jason Abraham MD, 200 mg at 05/10/24 0848    Lab Results:      Results from last 7 days   Lab Units 05/10/24  0336 05/09/24  0551 05/08/24  0612   WBC Thousand/uL 18.27* 22.47* 25.37*   HEMOGLOBIN g/dL 9.1* 9.3* 10.0*   HEMATOCRIT % 29.4* 29.6* 32.3*   PLATELETS Thousands/uL 214 202 260         Results from last 7 days   Lab Units 05/10/24  0336 05/09/24  0551 05/08/24  0612 05/07/24  0647   SODIUM mmol/L 138 133* 132* 138   POTASSIUM mmol/L 4.3 4.0 3.6 3.9   CHLORIDE mmol/L 103 101 99 103   CO2 mmol/L 29 24 25 24   BUN mg/dL 12 12 10 13   CREATININE mg/dL 0.70 0.72 0.79 0.79   CALCIUM mg/dL 8.7 8.9 8.7 9.5   ALK PHOS U/L  --   --   --  44   ALT U/L  --   --   --  23   AST U/L  --   --   --  23     Results from last 7 days   Lab Units 05/10/24  0656 05/10/24  0336 05/09/24  1623 05/07/24  2031 05/07/24  1247   INR   --   --   --   --  1.08   PTT seconds 85* 65* 44*   < > 27    < > = values in this interval not displayed.     Results from last 7 days   Lab Units 05/09/24  0551 05/08/24  0612   MAGNESIUM mg/dL 2.1 1.7*       Telemetry: Personally reviewed.     Echo:  Left Ventricle: Left ventricular cavity size is normal. The left ventricular ejection fraction is 30%. Systolic function is severely reduced.    The following segments are akinetic: apical anterior, apical septal and apex.    The following segments are hypokinetic: mid anteroseptal, mid inferoseptal, apical inferior and apical lateral.    All other segments are normal.    Right Ventricle: Systolic function is normal. Wall motion is normal.    Right Atrium: The atrium is normal in size.  Mobile density could represent thrombus versus [employer] network    Tricuspid Valve: There is mild regurgitation. The estimated right ventricular systolic pressure is 25.00 mmHg.

## 2024-05-10 NOTE — PROGRESS NOTES
"Progress Note - Pulmonary   Vesna Langston 64 y.o. female MRN: [id number]  Unit/Bed#: S -01 Encounter: [id number]    Assessment:  Patient is a 64-year-old female with a past medical history of a flutter on Eliquis, CAD status post STEMI in March 2024 who initially presented with abdominal pain on 5/7/2024 and found to have acute appendicitis.  The patient was additionally found to have a single subsegmental right lower lobe pulmonary embolus.     Acute appendicitis  Pulmonary embolus  Acute hypoxemic respiratory failure  A flutter  CAD  Hyperlipidemia  Systolic heart failure     Plan:  Heparin gtt currently, may transition to Eliquis twice daily once stable form Surgery perspective   SCDs, DVT precautions  Incentive spirometer, out of bed to chair  Respiratory protocol  Rest of care per primary  Signing off. Please reach out for any question or concern.    Subjective:   No acute event overnight. This morning, patient reports gas pain but denied shortness of breath, chest pain, fever and chill.     Objective:     Vitals: Blood pressure 113/67, pulse 83, temperature 99.3 °F (37.4 °C), temperature source Oral, resp. rate 18, height 5' 8\" (1.727 m), weight 96.9 kg (213 lb 10 oz), SpO2 92%.,Body mass index is 32.48 kg/m².      Intake/Output Summary (Last 24 hours) at 5/10/2024 1128  Last data filed at 5/10/2024 0930  Gross per 24 hour   Intake 0 ml   Output 650 ml   Net -650 ml       Invasive Devices       Peripheral Intravenous Line  Duration             Peripheral IV 05/07/24 Left Antecubital 2 days    Long-Dwell Peripheral IV (Midline) 05/10/24 Right Basilic <1 day                    Physical Exam: /67 (BP Location: Right arm)   Pulse 83   Temp 99.3 °F (37.4 °C) (Oral)   Resp 18   Ht 5' 8\" (1.727 m)   Wt 96.9 kg (213 lb 10 oz)   SpO2 92%   BMI 32.48 kg/m²     General Appearance:    Alert, cooperative, no distress, appears stated age   Head:    Normocephalic, without obvious abnormality, atraumatic "   Eyes:    PERRL, conjunctiva/corneas clear, EOM's intact, fundi     benign, both eyes   Ears:    Normal TM's and external ear canals, both ears   Nose:   Nares normal, septum midline, mucosa normal, no drainage    or sinus tenderness   Throat:   Lips, mucosa, and tongue normal; teeth and gums normal   Neck:   Supple, symmetrical, trachea midline, no adenopathy;     thyroid:  no enlargement/tenderness/nodules; no carotid    bruit or JVD   Back:     Symmetric, no curvature, ROM normal, no CVA tenderness   Lungs:     Clear to auscultation bilaterally, respirations unlabored   Chest Wall:    No tenderness or deformity    Heart:    Regular rate and rhythm, S1 and S2 normal, no murmur, rub   or gallop   Breast Exam:    No tenderness, masses, or nipple abnormality   Abdomen:     Soft, non-tender, bowel sounds active all four quadrants,     no masses, no organomegaly   Genitalia:    Normal female without lesion, discharge or tenderness   Rectal:    Normal tone, no masses or tenderness; guaiac negative stool   Extremities:   Extremities normal, atraumatic, no cyanosis or edema   Pulses:   2+ and symmetric all extremities   Skin:   Skin color, texture, turgor normal, no rashes or lesions   Lymph nodes:   Cervical, supraclavicular, and axillary nodes normal   Neurologic:   CNII-XII intact, normal strength, sensation and reflexes     throughout        Labs: I have personally reviewed pertinent lab results., CBC:   Lab Results   Component Value Date    WBC 18.27 (H) 05/10/2024    HGB 9.1 (L) 05/10/2024    HCT 29.4 (L) 05/10/2024    MCV 93 05/10/2024     05/10/2024    RBC 3.18 (L) 05/10/2024    MCH 28.6 05/10/2024    MCHC 31.0 (L) 05/10/2024    RDW 14.6 05/10/2024    MPV 10.5 05/10/2024    NRBC 0 05/10/2024   , CMP:   Lab Results   Component Value Date    SODIUM 138 05/10/2024    K 4.3 05/10/2024     05/10/2024    CO2 29 05/10/2024    BUN 12 05/10/2024    CREATININE 0.70 05/10/2024    CALCIUM 8.7 05/10/2024    EGFR  90 05/10/2024     Imaging and other studies: I have personally reviewed pertinent reports.

## 2024-05-10 NOTE — PLAN OF CARE
Problem: PHYSICAL THERAPY ADULT  Goal: Performs mobility at highest level of function for planned discharge setting.  See evaluation for individualized goals.  Description: Treatment/Interventions: Functional transfer training, LE strengthening/ROM, Elevations, Therapeutic exercise, Equipment eval/education, Bed mobility, Gait training, Patient/family training, Endurance training, Spoke to nursing, Spoke to case management, OT  Equipment Recommended: (S)  (Cane @ least (which pt reports she has). Pt reports that she would like another walker for discharge for community mobility as her walker is in her home environment which is now in a group home.)       See flowsheet documentation for full assessment, interventions and recommendations.  Note: Prognosis: Fair  Problem List: Decreased strength, Decreased endurance, Impaired balance, Decreased mobility, Obesity, Pain (gait deviations)  Assessment: Pt is a 64 y.o. female seen for PT evaluation s/p admit to [facility] on 5/7/2024 w/ Acute appendicitis with localized peritonitis and gangrene, without perforation or abscess. Additionally, Pt has recent of STEMI s/p PCI  implanted by Dr. Lama on 3/27/2024.  Order placed for PT.      Prior to admission: Pt has been temporarily staying with her daughter in a bilevel home, using SPC prior to this admission.  Reports having 2 recent falls when trying to follow therapies recommendations to strengthen her leg and use LLE to ascend and RLE to descend.  Patient had been seeing OP PT, and was scheduled for cardiac rehab within the week.  Upon evaluation: Pt needed no physical assistance for transfers, ambulation with unilateral support of IV pole.      However, Pt's clinical presentation is currently unstable/unpredictable given the functional mobility deficits above, especially (but not limited to) weakness, gait deviations, and pain, and combined with medical complications including abnormal H&H, abnormal WBCs, and  readmission to hospital.  Pt IS NOT at her mobility baseline.  She is at risk for falls based on hx of falls, impaired balance, and obesity.       During this admission, pt would benefit from continued skilled inpatient PT in the acute care setting in order to address deficits as defined above to maximize function and mobility.      Recommendations:     From a PT standpoint, recommend next several sessions focus on continued mobility training preferably with a unilateral device 1 side when having the IV pole and the other, versus rolling walker.   Instructed patient to continue amb trials more frequent trials in halls for less distances and pacing.  Barriers to Discharge: Inaccessible home environment, Decreased caregiver support     Rehab Resource Intensity Level, PT: III (Minimum Resource Intensity)    See flowsheet documentation for full assessment.

## 2024-05-10 NOTE — PROCEDURES
Venous Access Line Insertion    Date/Time: 5/10/2024 10:34 AM    Performed by: Nicole Carbajal RN  Authorized by: Billy Colin DO    Patient location:  Bedside  Consent:     Consent obtained:  Verbal (No written consent needed. pat ok with line)    Consent given by:  Patient  Universal protocol:     Procedure explained and questions answered to patient or proxy's satisfaction: yes      Immediately prior to procedure, a time out was called: yes      Relevant documents present and verified: yes      Test results available and properly labeled: yes      Radiology Images displayed and confirmed.  If images not available, report reviewed: no      Required blood products, implants, devices, and special equipment available: yes      Site/side marked: yes      Patient identity confirmed:  Verbally with patient and arm band  Pre-procedure details:     Hand hygiene: Hand hygiene performed prior to insertion      Sterile barrier technique: All elements of maximal sterile technique followed      Skin preparation:  ChloraPrep    Skin preparation agent: Skin preparation agent completely dried prior to procedure    Procedure details:     Complex Venous Access Line Type: Midline      Complex Venous Access Line Indications: new site and other (comment)      Complex Venous Access Line Indications comment:  Hep Gtt    Orientation:  Right and upper    Location:  Basilic    Catheter size:  18 gauge    Total catheter length (cm):  10    Catheter out on skin (cm):  0    Max flow rate:  999ml/hr    Arm circumference:  31cm    Patient evaluated for contraindications to access (i.e. fistula, thrombosis, etc): Yes      Approach: percutaneous technique used      Sterile ultrasound techniques: Sterile gel and sterile probe covers were used      Number of attempts:  1    Successful placement: yes      Landmarks identified: yes    Anesthesia (see MAR for exact dosages):     Anesthesia method:  None  Post-procedure details:      Post-procedure:  Dressing applied and securement device placed    Post-procedure complications: none      Patient tolerance of procedure:  Tolerated well, no immediate complications

## 2024-05-10 NOTE — PROGRESS NOTES
"Progress Note - General Surgery   Vesna Langston 64 y.o. female MRN: [id number]  Unit/Bed#: S -01 Encounter: [id number]    Assessment:  Vesna Langston is a 64 y.o. female PMH PCI/NATHAN (3/24) on plavix/eliquis who p/w gangrenous appendicitis s/p laparoscopic appendectomy 5/8       Plan:  Diet as tolerated  Will transition to oral antibiotics for total 4 days  Heparin gtt, Eliquis on DC  PT OT this morning  Plavix  Possible home later today pending PT eval  F/u am labs  Strict I/O monitoring      Subjective/Objective     Subjective:   No acute events overnight.  Some nausea no vomiting.  Passing flatus no bowel movements.  Pain overall improved.    Objective:     Blood pressure 122/73, pulse 85, temperature 98.6 °F (37 °C), resp. rate 18, height 5' 8\" (1.727 m), weight 96.3 kg (212 lb 3.2 oz), SpO2 (!) 86%.,Body mass index is 32.26 kg/m².      Intake/Output Summary (Last 24 hours) at 5/9/2024 2352  Last data filed at 5/9/2024 1159  Gross per 24 hour   Intake 180 ml   Output 200 ml   Net -20 ml       Invasive Devices       Peripheral Intravenous Line  Duration             Peripheral IV 05/07/24 Left Antecubital 2 days                    Physical Exam:   Gen: NAD, Comfortable  Neuro: A&O, No focal deficits  Head: Normal Cephalic, Atraumatic  Eye: EOMI, PERRLA, No scleral icterus  Neck: Supple, No JVD, Midline trachea  CV: RRR, Cap refill <2 sec  Pulm: Normal work of breathing, no respiratory distress  Abd: Soft, Non-Distended, Non-Tender  Ext: No edema, Non-tender  Skin: warm, dry, intact      "

## 2024-05-10 NOTE — PLAN OF CARE
Problem: OCCUPATIONAL THERAPY ADULT  Goal: Performs self-care activities at highest level of function for planned discharge setting.  See evaluation for individualized goals.  Description: Treatment Interventions: ADL retraining, Functional transfer training, Endurance training, Patient/family training, Equipment evaluation/education, Energy conservation, Compensatory technique education          See flowsheet documentation for full assessment, interventions and recommendations.   Note: Limitation: Decreased ADL status, Decreased endurance, Decreased self-care trans, Decreased high-level ADLs  Prognosis: Good  Assessment: Patient is a 64 y.o. female seen for OT evaluation at [facility] following admission on 5/7/2024  s/p Acute appendicitis with localized peritonitis and gangrene, without perforation or abscess. Please see above for comprehensive list of comorbidities and significant PMHx impacting functional performance.  Upon initial evaluation, pt appears to be performing near baseline functional status.   Occupational performance is affected by the following deficits: decreased balance , decreased functional reach , decreased activity tolerance , impaired sensation , and (+) pain . Personal/Environmental factors impacting D/C include: (+) Hx of falls  and Assistance needed for ADLs. Supporting factors include: able to maintain FFSU and support system available Patient would benefit from OT services within the acute care setting to maximize level of functional independence in the following areas ADLs, energy conservation  From OT standpoint, recommendation at time of D/C would be level 4: no rehabilitation needs .     Rehab Resource Intensity Level, OT: No post-acute rehabilitation needs        Melly Hernandez, OT

## 2024-05-11 LAB
ANION GAP SERPL CALCULATED.3IONS-SCNC: 7 MMOL/L (ref 4–13)
APTT PPP: 72 SECONDS (ref 23–37)
BASOPHILS # BLD AUTO: 0.05 THOUSANDS/ÂΜL (ref 0–0.1)
BASOPHILS NFR BLD AUTO: 1 % (ref 0–1)
BUN SERPL-MCNC: 8 MG/DL (ref 5–25)
CALCIUM SERPL-MCNC: 8.5 MG/DL (ref 8.4–10.2)
CHLORIDE SERPL-SCNC: 103 MMOL/L (ref 96–108)
CO2 SERPL-SCNC: 29 MMOL/L (ref 21–32)
CREAT SERPL-MCNC: 0.7 MG/DL (ref 0.6–1.3)
EOSINOPHIL # BLD AUTO: 0.34 THOUSAND/ÂΜL (ref 0–0.61)
EOSINOPHIL NFR BLD AUTO: 4 % (ref 0–6)
ERYTHROCYTE [DISTWIDTH] IN BLOOD BY AUTOMATED COUNT: 14.6 % (ref 11.6–15.1)
GFR SERPL CREATININE-BSD FRML MDRD: 90 ML/MIN/1.73SQ M
GLUCOSE SERPL-MCNC: 115 MG/DL (ref 65–140)
GLUCOSE SERPL-MCNC: 126 MG/DL (ref 65–140)
GLUCOSE SERPL-MCNC: 148 MG/DL (ref 65–140)
GLUCOSE SERPL-MCNC: 91 MG/DL (ref 65–140)
GLUCOSE SERPL-MCNC: 98 MG/DL (ref 65–140)
HCT VFR BLD AUTO: 28.9 % (ref 34.8–46.1)
HGB BLD-MCNC: 8.9 G/DL (ref 11.5–15.4)
IMM GRANULOCYTES # BLD AUTO: 0.04 THOUSAND/UL (ref 0–0.2)
IMM GRANULOCYTES NFR BLD AUTO: 1 % (ref 0–2)
LYMPHOCYTES # BLD AUTO: 2.2 THOUSANDS/ÂΜL (ref 0.6–4.47)
LYMPHOCYTES NFR BLD AUTO: 27 % (ref 14–44)
MAGNESIUM SERPL-MCNC: 2 MG/DL (ref 1.9–2.7)
MCH RBC QN AUTO: 28.7 PG (ref 26.8–34.3)
MCHC RBC AUTO-ENTMCNC: 30.8 G/DL (ref 31.4–37.4)
MCV RBC AUTO: 93 FL (ref 82–98)
MONOCYTES # BLD AUTO: 0.78 THOUSAND/ÂΜL (ref 0.17–1.22)
MONOCYTES NFR BLD AUTO: 10 % (ref 4–12)
NEUTROPHILS # BLD AUTO: 4.68 THOUSANDS/ÂΜL (ref 1.85–7.62)
NEUTS SEG NFR BLD AUTO: 57 % (ref 43–75)
NRBC BLD AUTO-RTO: 0 /100 WBCS
PHOSPHATE SERPL-MCNC: 3 MG/DL (ref 2.3–4.1)
PLATELET # BLD AUTO: 231 THOUSANDS/UL (ref 149–390)
PMV BLD AUTO: 10.5 FL (ref 8.9–12.7)
POTASSIUM SERPL-SCNC: 3.5 MMOL/L (ref 3.5–5.3)
RBC # BLD AUTO: 3.1 MILLION/UL (ref 3.81–5.12)
SODIUM SERPL-SCNC: 139 MMOL/L (ref 135–147)
WBC # BLD AUTO: 8.09 THOUSAND/UL (ref 4.31–10.16)

## 2024-05-11 PROCEDURE — 83735 ASSAY OF MAGNESIUM: CPT | Performed by: PHYSICIAN ASSISTANT

## 2024-05-11 PROCEDURE — 82948 REAGENT STRIP/BLOOD GLUCOSE: CPT

## 2024-05-11 PROCEDURE — 85730 THROMBOPLASTIN TIME PARTIAL: CPT | Performed by: SURGERY

## 2024-05-11 PROCEDURE — 80048 BASIC METABOLIC PNL TOTAL CA: CPT | Performed by: PHYSICIAN ASSISTANT

## 2024-05-11 PROCEDURE — 99232 SBSQ HOSP IP/OBS MODERATE 35: CPT | Performed by: INTERNAL MEDICINE

## 2024-05-11 PROCEDURE — 84100 ASSAY OF PHOSPHORUS: CPT | Performed by: PHYSICIAN ASSISTANT

## 2024-05-11 PROCEDURE — 99024 POSTOP FOLLOW-UP VISIT: CPT | Performed by: SURGERY

## 2024-05-11 PROCEDURE — 85025 COMPLETE CBC W/AUTO DIFF WBC: CPT | Performed by: PHYSICIAN ASSISTANT

## 2024-05-11 RX ORDER — DOCUSATE SODIUM 100 MG/1
100 CAPSULE, LIQUID FILLED ORAL 2 TIMES DAILY
Status: DISCONTINUED | OUTPATIENT
Start: 2024-05-11 | End: 2024-05-11

## 2024-05-11 RX ORDER — BISACODYL 10 MG
10 SUPPOSITORY, RECTAL RECTAL 2 TIMES DAILY PRN
Status: DISCONTINUED | OUTPATIENT
Start: 2024-05-11 | End: 2024-05-12 | Stop reason: HOSPADM

## 2024-05-11 RX ORDER — FUROSEMIDE 10 MG/ML
20 INJECTION INTRAMUSCULAR; INTRAVENOUS ONCE
Status: COMPLETED | OUTPATIENT
Start: 2024-05-11 | End: 2024-05-11

## 2024-05-11 RX ORDER — AMOXICILLIN 250 MG
2 CAPSULE ORAL 2 TIMES DAILY
Status: DISCONTINUED | OUTPATIENT
Start: 2024-05-11 | End: 2024-05-12 | Stop reason: HOSPADM

## 2024-05-11 RX ORDER — POTASSIUM CHLORIDE 20 MEQ/1
40 TABLET, EXTENDED RELEASE ORAL ONCE
Status: COMPLETED | OUTPATIENT
Start: 2024-05-11 | End: 2024-05-11

## 2024-05-11 RX ORDER — POLYETHYLENE GLYCOL 3350 17 G/17G
17 POWDER, FOR SOLUTION ORAL 2 TIMES DAILY
Status: DISCONTINUED | OUTPATIENT
Start: 2024-05-11 | End: 2024-05-12 | Stop reason: HOSPADM

## 2024-05-11 RX ADMIN — ACETAMINOPHEN 650 MG: 325 TABLET, FILM COATED ORAL at 21:11

## 2024-05-11 RX ADMIN — HEPARIN SODIUM 17.1 UNITS/KG/HR: 10000 INJECTION, SOLUTION INTRAVENOUS at 21:14

## 2024-05-11 RX ADMIN — ATORVASTATIN CALCIUM 80 MG: 40 TABLET, FILM COATED ORAL at 17:47

## 2024-05-11 RX ADMIN — SENNOSIDES, DOCUSATE SODIUM 2 TABLET: 8.6; 5 TABLET ORAL at 08:54

## 2024-05-11 RX ADMIN — SENNOSIDES, DOCUSATE SODIUM 2 TABLET: 8.6; 5 TABLET ORAL at 17:47

## 2024-05-11 RX ADMIN — Medication 12.5 MG: at 21:14

## 2024-05-11 RX ADMIN — PANTOPRAZOLE SODIUM 40 MG: 40 TABLET, DELAYED RELEASE ORAL at 05:16

## 2024-05-11 RX ADMIN — LEVOTHYROXINE SODIUM 50 MCG: 50 TABLET ORAL at 08:56

## 2024-05-11 RX ADMIN — OXYCODONE HYDROCHLORIDE 10 MG: 10 TABLET ORAL at 21:11

## 2024-05-11 RX ADMIN — SUCRALFATE 1 G: 1 TABLET ORAL at 16:20

## 2024-05-11 RX ADMIN — SUCRALFATE 1 G: 1 TABLET ORAL at 05:16

## 2024-05-11 RX ADMIN — ESCITALOPRAM OXALATE 10 MG: 10 TABLET ORAL at 08:57

## 2024-05-11 RX ADMIN — SUCRALFATE 1 G: 1 TABLET ORAL at 11:19

## 2024-05-11 RX ADMIN — HEPARIN SODIUM 17.1 UNITS/KG/HR: 10000 INJECTION, SOLUTION INTRAVENOUS at 06:19

## 2024-05-11 RX ADMIN — ACETAMINOPHEN 650 MG: 325 TABLET, FILM COATED ORAL at 05:16

## 2024-05-11 RX ADMIN — AMIODARONE HYDROCHLORIDE 200 MG: 200 TABLET ORAL at 08:56

## 2024-05-11 RX ADMIN — FUROSEMIDE 20 MG: 10 INJECTION, SOLUTION INTRAMUSCULAR; INTRAVENOUS at 11:19

## 2024-05-11 RX ADMIN — PIPERACILLIN SODIUM AND TAZOBACTAM SODIUM 4.5 G: 36; 4.5 INJECTION, POWDER, LYOPHILIZED, FOR SOLUTION INTRAVENOUS at 14:31

## 2024-05-11 RX ADMIN — PIPERACILLIN SODIUM AND TAZOBACTAM SODIUM 4.5 G: 36; 4.5 INJECTION, POWDER, LYOPHILIZED, FOR SOLUTION INTRAVENOUS at 05:04

## 2024-05-11 RX ADMIN — POLYETHYLENE GLYCOL 3350 17 G: 17 POWDER, FOR SOLUTION ORAL at 08:54

## 2024-05-11 RX ADMIN — OXYCODONE HYDROCHLORIDE 5 MG: 5 TABLET ORAL at 05:22

## 2024-05-11 RX ADMIN — CLOPIDOGREL BISULFATE 75 MG: 75 TABLET ORAL at 08:55

## 2024-05-11 RX ADMIN — POTASSIUM CHLORIDE 40 MEQ: 1500 TABLET, EXTENDED RELEASE ORAL at 17:47

## 2024-05-11 RX ADMIN — PIPERACILLIN SODIUM AND TAZOBACTAM SODIUM 4.5 G: 36; 4.5 INJECTION, POWDER, LYOPHILIZED, FOR SOLUTION INTRAVENOUS at 21:14

## 2024-05-11 NOTE — PROGRESS NOTES
Progress Note - Cardiology   Vesna Langston 64 y.o. female MRN: [id number]  Unit/Bed#: S -01 Encounter: [id number]    Assessment:  Principal Problem:    Acute appendicitis with localized peritonitis and gangrene, without perforation or abscess  Active Problems:    Atrial flutter, paroxysmal (HCC)    Hyperlipemia    Ischemic cardiomyopathy    Chronic low back pain    HFrEF (heart failure with reduced ejection fraction) (HCC)    Coronary artery disease involving native coronary artery of native heart    Acquired hypothyroidism    Generalized anxiety disorder    Acute pulmonary embolism without acute cor pulmonale (HCC)    History of gastric ulcer    64-year-old female with a cardiac history of coronary artery disease with most recent PCI in March with VT at that time. ICD placed. Ischemic cardiomyopathy with an ejection fraction 30%. Paroxysmal atrial fibrillation/flutter. Presented with acute appendicitis. Now status post appendectomy 5/8.    Plan:  Coronary artery disease:  On Plavix. On heparin infusion for now. She is to start Eliquis when discharged. Metoprolol being held due to hold parameters, but can continue with these parameters currently. Currently without any angina.    Ischemic cardiomyopathy:  Ejection fraction 30 to 35%. Repeat 20 mg IV Lasix today. Beta-blocker ordered with hold parameters. Plan was to initiate Entresto. Given the hypotension I will not start this today. If she is to be discharged this afternoon/evening, she can initiate Entresto tomorrow. Otherwise I will reevaluate her blood pressures tomorrow and let her know when she can start this if she remains in the hospital.    Ventricular tachycardia:  No recent episodes noted here. ICD is in place. Continue amiodarone, metoprolol.    Paroxysmal atrial fibrillation:  In sinus rhythm. Amiodarone, metoprolol. On anticoagulation currently with heparin infusion. Typically on Eliquis, which is going to be restarted on  "discharge    PE:  Anticoagulation as above. Somewhat incidentally found on the CT scan done on 5/7        Subjective/Objective     Subjective:  Her diet is being advanced. Blood pressure remains low. She says this is somewhat chronic, but it seems to be lower than what it is at home. She urinated well with the Lasix she got yesterday her weight is down, but she still has some crackles and intermittent hypoxia.    Objective:    Vitals: BP 96/62   Pulse 64   Temp 98.1 °F (36.7 °C)   Resp 16   Ht 5' 8\" (1.727 m)   Wt 95.7 kg (210 lb 15.7 oz)   SpO2 92%   BMI 32.08 kg/m²   Vitals:    05/10/24 0600 05/11/24 0512   Weight: 96.9 kg (213 lb 10 oz) 95.7 kg (210 lb 15.7 oz)     Orthostatic Blood Pressures      Flowsheet Row Most Recent Value   Blood Pressure 96/62 filed at 05/11/2024 0746   Patient Position - Orthostatic VS Sitting filed at 05/10/2024 0742              Intake/Output Summary (Last 24 hours) at 5/11/2024 1044  Last data filed at 5/11/2024 0520  Gross per 24 hour   Intake 720 ml   Output 375 ml   Net 345 ml     Physical Exam:  GEN: Vesna Langston appears well, alert and oriented x 3, pleasant and cooperative   HEENT: pupils equal, round, and reactive to light; extraocular muscles intact  NECK: supple, no carotid bruits   HEART: regular rhythm, normal S1 and S2, no murmurs, clicks, gallops or rubs   LUNGS: rales bilaterally  ABDOMEN: mild tenderness.   EXTREMITIES: trace edema  NEURO: no focal findings   SKIN: normal without suspicious lesions on exposed skin.    Medications:    Current Facility-Administered Medications:     acetaminophen (TYLENOL) tablet 650 mg, 650 mg, Oral, Q6H PRN, Jason Abraham MD, 650 mg at 05/11/24 0516    albuterol (PROVENTIL HFA,VENTOLIN HFA) inhaler 2 puff, 2 puff, Inhalation, Q6H PRN, Jason Abraham MD    amiodarone tablet 200 mg, 200 mg, Oral, Daily With Breakfast, Jason Abraham MD, 200 mg at 05/11/24 0856    atorvastatin (LIPITOR) tablet 80 mg, 80 mg, Oral, QPM, " Jason Abraham MD, 80 mg at 05/10/24 1808    bisacodyl (DULCOLAX) rectal suppository 10 mg, 10 mg, Rectal, BID PRN, Maury Ortiz MD    clopidogrel (PLAVIX) tablet 75 mg, 75 mg, Oral, Daily, Yina Gunn PA-C, 75 mg at 05/11/24 0855    escitalopram (LEXAPRO) tablet 10 mg, 10 mg, Oral, Daily, Yina Gunn PA-C, 10 mg at 05/11/24 0857    furosemide (LASIX) injection 20 mg, 20 mg, Intravenous, Once, Farooq Nguyen MD    gabapentin (NEURONTIN) capsule 100 mg, 100 mg, Oral, BID, Yina Gunn PA-C, 100 mg at 05/10/24 1808    heparin (porcine) 25,000 units in 0.45% NaCl 250 mL infusion (premix), 3-20 Units/kg/hr (Order-Specific), Intravenous, Titrated, Billy Colin DO, Last Rate: 15.4 mL/hr at 05/11/24 0619, 17.1 Units/kg/hr at 05/11/24 0619    HYDROmorphone (DILAUDID) injection 0.5 mg, 0.5 mg, Intravenous, Q3H PRN, Jason Abraham MD, 0.5 mg at 05/07/24 1634    insulin lispro (HumALOG/ADMELOG) 100 units/mL subcutaneous injection 1-6 Units, 1-6 Units, Subcutaneous, TID AC, 1 Units at 05/10/24 1709 **AND** Fingerstick Glucose (POCT), , , TID AC, Jason Abraham MD    levothyroxine tablet 50 mcg, 50 mcg, Oral, Daily, Yina Gunn PA-C, 50 mcg at 05/11/24 0856    meclizine (ANTIVERT) tablet 25 mg, 25 mg, Oral, Q8H PRN, Yina Gunn PA-C    metoprolol tartrate (LOPRESSOR) partial tablet 12.5 mg, 12.5 mg, Oral, Q12H MONA, Farooq Nguyen MD    oxyCODONE (ROXICODONE) immediate release tablet 10 mg, 10 mg, Oral, Q4H PRN, Jason Abraham MD, 10 mg at 05/10/24 0009    oxyCODONE (ROXICODONE) IR tablet 5 mg, 5 mg, Oral, Q4H PRN, Jason Abraham MD, 5 mg at 05/11/24 0522    pantoprazole (PROTONIX) EC tablet 40 mg, 40 mg, Oral, Daily Before Breakfast, Billy Colin DO, 40 mg at 05/11/24 0516    piperacillin-tazobactam (ZOSYN) 4.5 g in sodium chloride 0.9 % 100 mL IV LOADING DOSE, 4.5 g, Intravenous, Once, Held at 05/10/24 0937 **FOLLOWED BY** piperacillin-tazobactam (ZOSYN) 4.5 g in sodium  chloride 0.9 % 100 mL IVPB (EXTENDED INFUSION), 4.5 g, Intravenous, Q8H, Maury Ortiz MD, Last Rate: 25 mL/hr at 05/11/24 0504, 4.5 g at 05/11/24 0504    polyethylene glycol (MIRALAX) packet 17 g, 17 g, Oral, BID, Maury Ortiz MD, 17 g at 05/11/24 0854    senna-docusate sodium (SENOKOT S) 8.6-50 mg per tablet 2 tablet, 2 tablet, Oral, BID, Maury Ortiz MD, 2 tablet at 05/11/24 0854    simethicone (MYLICON) chewable tablet 80 mg, 80 mg, Oral, Q6H PRN, Yina Gunn PA-C    sucralfate (CARAFATE) tablet 1 g, 1 g, Oral, 4x Daily (AC & HS), Yina Gunn PA-C, 1 g at 05/11/24 0516    trimethobenzamide (TIGAN) IM injection 200 mg, 200 mg, Intramuscular, Q6H PRN, Jason Abraham MD, 200 mg at 05/10/24 0848    Lab Results:      Results from last 7 days   Lab Units 05/11/24  0515 05/10/24  0336 05/09/24  0551   WBC Thousand/uL 8.09 18.27* 22.47*   HEMOGLOBIN g/dL 8.9* 9.1* 9.3*   HEMATOCRIT % 28.9* 29.4* 29.6*   PLATELETS Thousands/uL 231 214 202         Results from last 7 days   Lab Units 05/11/24  0515 05/10/24  0336 05/09/24  0551 05/08/24  0612 05/07/24  0647   SODIUM mmol/L 139 138 133*   < > 138   POTASSIUM mmol/L 3.5 4.3 4.0   < > 3.9   CHLORIDE mmol/L 103 103 101   < > 103   CO2 mmol/L 29 29 24   < > 24   BUN mg/dL 8 12 12   < > 13   CREATININE mg/dL 0.70 0.70 0.72   < > 0.79   CALCIUM mg/dL 8.5 8.7 8.9   < > 9.5   ALK PHOS U/L  --   --   --   --  44   ALT U/L  --   --   --   --  23   AST U/L  --   --   --   --  23    < > = values in this interval not displayed.     Results from last 7 days   Lab Units 05/11/24  0132 05/10/24  1804 05/10/24  0656 05/07/24  2031 05/07/24  1247   INR   --   --   --   --  1.08   PTT seconds 72* 63* 85*   < > 27    < > = values in this interval not displayed.     Results from last 7 days   Lab Units 05/11/24  0515 05/09/24  0551 05/08/24  0612   MAGNESIUM mg/dL 2.0 2.1 1.7*     Telemetry: Personally reviewed.     Echo:  Left Ventricle: Left ventricular cavity  size is normal. The left ventricular ejection fraction is 30%. Systolic function is severely reduced.    The following segments are akinetic: apical anterior, apical septal and apex.    The following segments are hypokinetic: mid anteroseptal, mid inferoseptal, apical inferior and apical lateral.    All other segments are normal.    Right Ventricle: Systolic function is normal. Wall motion is normal.    Right Atrium: The atrium is normal in size.  Mobile density could represent thrombus versus [employer] network    Tricuspid Valve: There is mild regurgitation. The estimated right ventricular systolic pressure is 25.00 mmHg.

## 2024-05-11 NOTE — PROGRESS NOTES
Progress Note - General Surgery  : RA Surgery Resident on [facility]    Vesna Langston 64 y.o. female MRN: [id number]  Unit/Bed#: S -Natalia Encounter: [id number]      Assessment:  64 y.o. female s/p 5/8 lap appy in the setting of recent MI (STEMI s/p LAD stent 6w ago)      Plan:  Re-advance to regular diet  Off IVF  Continue antibiotics through tomorrow night  F/u AM WBC  Hep gtt until discharge then back to Eliquis BID  Plavix  Appreciate cardiology input        Subjective: Tolerated clears/toast for dinner, endorses flatus, no BM yet      Objective:     Physical Exam:  GEN: NAD   Ab: Soft, NT/ND, CDI  Lung: Normal effort   CV: RRR   Extrem: No CCE   Neuro: A+Ox3       I/O         05/09 0701  05/10 0700 05/10 0701 05/11 0700    P.O. 180 0    I.V. (mL/kg)      IV Piggyback      Total Intake(mL/kg) 180 (1.9) 0 (0)    Urine (mL/kg/hr) 650 (0.3)     Total Output 650     Net -470 0                  Lab, Imaging and other studies: I have personally reviewed pertinent reports.  , CBC with diff:   Lab Results   Component Value Date    WBC 18.27 (H) 05/10/2024    HGB 9.1 (L) 05/10/2024    HCT 29.4 (L) 05/10/2024    MCV 93 05/10/2024     05/10/2024    RBC 3.18 (L) 05/10/2024    MCH 28.6 05/10/2024    MCHC 31.0 (L) 05/10/2024    RDW 14.6 05/10/2024    MPV 10.5 05/10/2024    NRBC 0 05/10/2024   , BMP/CMP:   Lab Results   Component Value Date    SODIUM 138 05/10/2024    K 4.3 05/10/2024     05/10/2024    CO2 29 05/10/2024    BUN 12 05/10/2024    CREATININE 0.70 05/10/2024    CALCIUM 8.7 05/10/2024    EGFR 90 05/10/2024         VTE Pharmacologic Prophylaxis: Heparin      Maury Ortiz MD  5/10/2024 9:11 PM

## 2024-05-11 NOTE — PROGRESS NOTES
"Progress Note  Vesna Langston 64 y.o. female MRN: [id number]  Unit/Bed#: S -01 Encounter: [id number]    Assessment  64F s/p 5/8 lap appy in setting of STEMI s/p LAD stent 6w ago.    Plan  Regular diet  Zosyn ends today  Transition hep gtt to Eliquis  Plavix  Appreciate cardiology input  D/c home today    Subjective/Objective   NAOE. Pain controlled. Tolerating diet, no n/v. Having BMs, flatus. Voiding. Walking.    VSS on RA.  /68   Pulse 74   Temp 98.3 °F (36.8 °C)   Resp 16   Ht 5' 8\" (1.727 m)   Wt 95.7 kg (210 lb 15.7 oz)   SpO2 92%   BMI 32.08 kg/m²     Physical Exam:  General: NAD  CV: nl rate  Lungs: nl wob. No resp distress.  ABD: Soft, ND, lower tenderness. CDI  Extrem: No CCE    Stool: x1    Recent Labs     05/11/24  0132 05/11/24  0515 05/12/24  0457   WBC  --  8.09 8.63   HGB  --  8.9* 9.0*   PLT  --  231 258   SODIUM  --  139 137   K  --  3.5 3.8   CL  --  103 104   CO2  --  29 27   BUN  --  8 9   CREATININE  --  0.70 0.73   GLUC  --  91 102   CALCIUM  --  8.5 8.7   MG  --  2.0  --    PHOS  --  3.0  --    EGFR  --  90 86   PTT 72*  --  79*       "

## 2024-05-11 NOTE — PLAN OF CARE
Problem: Potential for Falls  Goal: Patient will remain free of falls  Description: INTERVENTIONS:  - Educate patient/family on patient safety including physical limitations  - Instruct patient to call for assistance with activity   - Consult OT/PT to assist with strengthening/mobility   - Keep Call bell within reach  - Keep bed low and locked with side rails adjusted as appropriate  - Keep care items and personal belongings within reach  - Initiate and maintain comfort rounds  - Make Fall Risk Sign visible to staff  Outcome: Progressing     Problem: PAIN - ADULT  Goal: Verbalizes/displays adequate comfort level or baseline comfort level  Description: Interventions:  - Encourage patient to monitor pain and request assistance  - Assess pain using appropriate pain scale  - Administer analgesics based on type and severity of pain and evaluate response  - Implement non-pharmacological measures as appropriate and evaluate response  - Consider cultural and social influences on pain and pain management  - Notify physician/advanced practitioner if interventions unsuccessful or patient reports new pain  Outcome: Progressing     Problem: INFECTION - ADULT  Goal: Absence or prevention of progression during hospitalization  Description: INTERVENTIONS:  - Assess and monitor for signs and symptoms of infection  - Monitor lab/diagnostic results  - Monitor all insertion sites, i.e. indwelling lines, tubes, and drains  - Monitor endotracheal if appropriate and nasal secretions for changes in amount and color  - [place] appropriate cooling/warming therapies per order  - Administer medications as ordered  - Instruct and encourage patient and family to use good hand hygiene technique  - Identify and instruct in appropriate isolation precautions for identified infection/condition  Outcome: Progressing     Problem: DISCHARGE PLANNING  Goal: Discharge to home or other facility with appropriate resources  Description: INTERVENTIONS:  -  Identify barriers to discharge w/patient and caregiver  - Arrange for needed discharge resources and transportation as appropriate  - Identify discharge learning needs (meds, wound care, etc.)  - Arrange for interpretive services to assist at discharge as needed  - Refer to Case Management Department for coordinating discharge planning if the patient needs post-hospital services based on physician/advanced practitioner order or complex needs related to functional status, cognitive ability, or social support system  Outcome: Progressing

## 2024-05-12 VITALS
WEIGHT: 210.1 LBS | OXYGEN SATURATION: 92 % | HEART RATE: 74 BPM | HEIGHT: 68 IN | RESPIRATION RATE: 16 BRPM | SYSTOLIC BLOOD PRESSURE: 117 MMHG | TEMPERATURE: 98.3 F | BODY MASS INDEX: 31.84 KG/M2 | DIASTOLIC BLOOD PRESSURE: 68 MMHG

## 2024-05-12 LAB
ANION GAP SERPL CALCULATED.3IONS-SCNC: 6 MMOL/L (ref 4–13)
APTT PPP: 79 SECONDS (ref 23–37)
BASOPHILS # BLD AUTO: 0.04 THOUSANDS/ÂΜL (ref 0–0.1)
BASOPHILS NFR BLD AUTO: 1 % (ref 0–1)
BUN SERPL-MCNC: 9 MG/DL (ref 5–25)
CALCIUM SERPL-MCNC: 8.7 MG/DL (ref 8.4–10.2)
CHLORIDE SERPL-SCNC: 104 MMOL/L (ref 96–108)
CO2 SERPL-SCNC: 27 MMOL/L (ref 21–32)
CREAT SERPL-MCNC: 0.73 MG/DL (ref 0.6–1.3)
EOSINOPHIL # BLD AUTO: 0.52 THOUSAND/ÂΜL (ref 0–0.61)
EOSINOPHIL NFR BLD AUTO: 6 % (ref 0–6)
ERYTHROCYTE [DISTWIDTH] IN BLOOD BY AUTOMATED COUNT: 14.6 % (ref 11.6–15.1)
GFR SERPL CREATININE-BSD FRML MDRD: 86 ML/MIN/1.73SQ M
GLUCOSE SERPL-MCNC: 102 MG/DL (ref 65–140)
GLUCOSE SERPL-MCNC: 107 MG/DL (ref 65–140)
HCT VFR BLD AUTO: 28.8 % (ref 34.8–46.1)
HGB BLD-MCNC: 9 G/DL (ref 11.5–15.4)
IMM GRANULOCYTES # BLD AUTO: 0.04 THOUSAND/UL (ref 0–0.2)
IMM GRANULOCYTES NFR BLD AUTO: 1 % (ref 0–2)
LYMPHOCYTES # BLD AUTO: 2.67 THOUSANDS/ÂΜL (ref 0.6–4.47)
LYMPHOCYTES NFR BLD AUTO: 31 % (ref 14–44)
MCH RBC QN AUTO: 28.5 PG (ref 26.8–34.3)
MCHC RBC AUTO-ENTMCNC: 31.3 G/DL (ref 31.4–37.4)
MCV RBC AUTO: 91 FL (ref 82–98)
MONOCYTES # BLD AUTO: 0.9 THOUSAND/ÂΜL (ref 0.17–1.22)
MONOCYTES NFR BLD AUTO: 10 % (ref 4–12)
NEUTROPHILS # BLD AUTO: 4.46 THOUSANDS/ÂΜL (ref 1.85–7.62)
NEUTS SEG NFR BLD AUTO: 51 % (ref 43–75)
NRBC BLD AUTO-RTO: 0 /100 WBCS
PLATELET # BLD AUTO: 258 THOUSANDS/UL (ref 149–390)
PMV BLD AUTO: 10.2 FL (ref 8.9–12.7)
POTASSIUM SERPL-SCNC: 3.8 MMOL/L (ref 3.5–5.3)
RBC # BLD AUTO: 3.16 MILLION/UL (ref 3.81–5.12)
SODIUM SERPL-SCNC: 137 MMOL/L (ref 135–147)
WBC # BLD AUTO: 8.63 THOUSAND/UL (ref 4.31–10.16)

## 2024-05-12 PROCEDURE — 99232 SBSQ HOSP IP/OBS MODERATE 35: CPT | Performed by: INTERNAL MEDICINE

## 2024-05-12 PROCEDURE — 80048 BASIC METABOLIC PNL TOTAL CA: CPT

## 2024-05-12 PROCEDURE — 85730 THROMBOPLASTIN TIME PARTIAL: CPT | Performed by: SURGERY

## 2024-05-12 PROCEDURE — 85025 COMPLETE CBC W/AUTO DIFF WBC: CPT

## 2024-05-12 PROCEDURE — 82948 REAGENT STRIP/BLOOD GLUCOSE: CPT

## 2024-05-12 PROCEDURE — NC001 PR NO CHARGE: Performed by: SURGERY

## 2024-05-12 PROCEDURE — 99024 POSTOP FOLLOW-UP VISIT: CPT | Performed by: SURGERY

## 2024-05-12 RX ORDER — HYDROCODONE BITARTRATE AND ACETAMINOPHEN 5; 325 MG/1; MG/1
1 TABLET ORAL EVERY 6 HOURS PRN
Qty: 10 TABLET | Refills: 0 | Status: SHIPPED | OUTPATIENT
Start: 2024-05-12

## 2024-05-12 RX ADMIN — PIPERACILLIN SODIUM AND TAZOBACTAM SODIUM 4.5 G: 36; 4.5 INJECTION, POWDER, LYOPHILIZED, FOR SOLUTION INTRAVENOUS at 04:58

## 2024-05-12 RX ADMIN — AMIODARONE HYDROCHLORIDE 200 MG: 200 TABLET ORAL at 08:54

## 2024-05-12 RX ADMIN — LEVOTHYROXINE SODIUM 50 MCG: 50 TABLET ORAL at 08:54

## 2024-05-12 RX ADMIN — PANTOPRAZOLE SODIUM 40 MG: 40 TABLET, DELAYED RELEASE ORAL at 04:58

## 2024-05-12 RX ADMIN — APIXABAN 5 MG: 5 TABLET, FILM COATED ORAL at 11:17

## 2024-05-12 RX ADMIN — CLOPIDOGREL BISULFATE 75 MG: 75 TABLET ORAL at 08:54

## 2024-05-12 RX ADMIN — SACUBITRIL AND VALSARTAN 1 TABLET: 24; 26 TABLET, FILM COATED ORAL at 08:53

## 2024-05-12 RX ADMIN — SUCRALFATE 1 G: 1 TABLET ORAL at 04:58

## 2024-05-12 RX ADMIN — SENNOSIDES, DOCUSATE SODIUM 2 TABLET: 8.6; 5 TABLET ORAL at 08:53

## 2024-05-12 RX ADMIN — POLYETHYLENE GLYCOL 3350 17 G: 17 POWDER, FOR SOLUTION ORAL at 08:53

## 2024-05-12 RX ADMIN — Medication 12.5 MG: at 08:54

## 2024-05-12 RX ADMIN — ESCITALOPRAM OXALATE 10 MG: 10 TABLET ORAL at 08:54

## 2024-05-12 RX ADMIN — SUCRALFATE 1 G: 1 TABLET ORAL at 11:17

## 2024-05-12 NOTE — PLAN OF CARE
Problem: Potential for Falls  Goal: Patient will remain free of falls  Description: INTERVENTIONS:  - Educate patient/family on patient safety including physical limitations  - Instruct patient to call for assistance with activity   - Consult OT/PT to assist with strengthening/mobility   - Keep Call bell within reach  - Keep bed low and locked with side rails adjusted as appropriate  - Keep care items and personal belongings within reach  - Initiate and maintain comfort rounds  - Make Fall Risk Sign visible to staff  Outcome: Progressing     Problem: PAIN - ADULT  Goal: Verbalizes/displays adequate comfort level or baseline comfort level  Description: Interventions:  - Encourage patient to monitor pain and request assistance  - Assess pain using appropriate pain scale  - Administer analgesics based on type and severity of pain and evaluate response  - Implement non-pharmacological measures as appropriate and evaluate response  - Consider cultural and social influences on pain and pain management  - Notify physician/advanced practitioner if interventions unsuccessful or patient reports new pain  Outcome: Progressing     Problem: INFECTION - ADULT  Goal: Absence or prevention of progression during hospitalization  Description: INTERVENTIONS:  - Assess and monitor for signs and symptoms of infection  - Monitor lab/diagnostic results  - Monitor all insertion sites, i.e. indwelling lines, tubes, and drains  - Monitor endotracheal if appropriate and nasal secretions for changes in amount and color  - [place] appropriate cooling/warming therapies per order  - Administer medications as ordered  - Instruct and encourage patient and family to use good hand hygiene technique  - Identify and instruct in appropriate isolation precautions for identified infection/condition  Outcome: Progressing     Problem: DISCHARGE PLANNING  Goal: Discharge to home or other facility with appropriate resources  Description: INTERVENTIONS:  -  Identify barriers to discharge w/patient and caregiver  - Arrange for needed discharge resources and transportation as appropriate  - Identify discharge learning needs (meds, wound care, etc.)  - Arrange for interpretive services to assist at discharge as needed  - Refer to Case Management Department for coordinating discharge planning if the patient needs post-hospital services based on physician/advanced practitioner order or complex needs related to functional status, cognitive ability, or social support system  Outcome: Progressing     Problem: Knowledge Deficit  Goal: Patient/family/caregiver demonstrates understanding of disease process, treatment plan, medications, and discharge instructions  Description: Complete learning assessment and assess knowledge base.  Interventions:  - Provide teaching at level of understanding  - Provide teaching via preferred learning methods  Outcome: Progressing

## 2024-05-12 NOTE — CASE MANAGEMENT
Case Management Progress Note    Patient name Vesna COLE /S -01 MRN [id number]  : 1960 Date 2024       LOS (days): 5  Geometric Mean LOS (GMLOS) (days):   Days to GMLOS:        OBJECTIVE:        Current admission status: Inpatient  Preferred Pharmacy:   [facility]/pharmacy #[id number] [place], [place] - [address] [address]  Phone: [phone] Fax: [phone]    [facility]/pharmacy #[id number] [place], [place] - [address]  [address] [facility] [address]  Phone: [phone] Fax: [phone]    Primary Care Provider: Brandon Pete MD    Primary Insurance: BLUE CROSS  Secondary Insurance: MEDICARE    PROGRESS NOTE:    Per [employer], patient's walker approved and she has a copayment of $19.55. Met with patient at bedside to inform of same. Patient confirmed agreement with cost and walker delivered from consignment. Delivery ticket uploaded to [employer]. Patient aware she will receive bill for copayment.

## 2024-05-12 NOTE — PLAN OF CARE
Problem: Potential for Falls  Goal: Patient will remain free of falls  Description: INTERVENTIONS:  - Educate patient/family on patient safety including physical limitations  - Instruct patient to call for assistance with activity   - Consult OT/PT to assist with strengthening/mobility   - Keep Call bell within reach  - Keep bed low and locked with side rails adjusted as appropriate  - Keep care items and personal belongings within reach  - Initiate and maintain comfort rounds  - Make Fall Risk Sign visible to staff  5/12/2024 1405 by Monserrat Vasquez RN  Outcome: Adequate for Discharge  5/12/2024 1405 by Monserrat Vasquez RN  Outcome: Progressing  5/12/2024 0717 by Monserrat Vasquez RN  Outcome: Progressing     Problem: PAIN - ADULT  Goal: Verbalizes/displays adequate comfort level or baseline comfort level  Description: Interventions:  - Encourage patient to monitor pain and request assistance  - Assess pain using appropriate pain scale  - Administer analgesics based on type and severity of pain and evaluate response  - Implement non-pharmacological measures as appropriate and evaluate response  - Consider cultural and social influences on pain and pain management  - Notify physician/advanced practitioner if interventions unsuccessful or patient reports new pain  5/12/2024 1405 by Monserrat Vasquez RN  Outcome: Adequate for Discharge  5/12/2024 1405 by Monserrat Vasquez RN  Outcome: Progressing  5/12/2024 0717 by Monserrat Vasquez RN  Outcome: Progressing     Problem: INFECTION - ADULT  Goal: Absence or prevention of progression during hospitalization  Description: INTERVENTIONS:  - Assess and monitor for signs and symptoms of infection  - Monitor lab/diagnostic results  - Monitor all insertion sites, i.e. indwelling lines, tubes, and drains  - Monitor endotracheal if appropriate and nasal secretions for changes in amount and color  - [place] appropriate cooling/warming therapies per order  -  Administer medications as ordered  - Instruct and encourage patient and family to use good hand hygiene technique  - Identify and instruct in appropriate isolation precautions for identified infection/condition  5/12/2024 1405 by Monserrat Vasquez RN  Outcome: Adequate for Discharge  5/12/2024 1405 by Monserrat Vasquez RN  Outcome: Progressing  5/12/2024 0717 by Monserrat Vasquez RN  Outcome: Progressing     Problem: DISCHARGE PLANNING  Goal: Discharge to home or other facility with appropriate resources  Description: INTERVENTIONS:  - Identify barriers to discharge w/patient and caregiver  - Arrange for needed discharge resources and transportation as appropriate  - Identify discharge learning needs (meds, wound care, etc.)  - Arrange for interpretive services to assist at discharge as needed  - Refer to Case Management Department for coordinating discharge planning if the patient needs post-hospital services based on physician/advanced practitioner order or complex needs related to functional status, cognitive ability, or social support system  5/12/2024 1405 by Monserrat Vasquez RN  Outcome: Adequate for Discharge  5/12/2024 1405 by Monserrat Vasquez RN  Outcome: Progressing  5/12/2024 0717 by Monserrat Vasquez RN  Outcome: Progressing     Problem: Knowledge Deficit  Goal: Patient/family/caregiver demonstrates understanding of disease process, treatment plan, medications, and discharge instructions  Description: Complete learning assessment and assess knowledge base.  Interventions:  - Provide teaching at level of understanding  - Provide teaching via preferred learning methods  5/12/2024 1405 by Monserrat Vasquez RN  Outcome: Adequate for Discharge  5/12/2024 1405 by Monserrat Vasquez RN  Outcome: Progressing  5/12/2024 0717 by Monserrat Vasquez RN  Outcome: Progressing     Problem: Prexisting or High Potential for Compromised Skin Integrity  Goal: Skin integrity is maintained or  improved  Description: INTERVENTIONS:  - Identify patients at risk for skin breakdown  - Assess and monitor skin integrity  - Assess and monitor nutrition and hydration status  - Monitor labs   - Assess for incontinence   - Turn and reposition patient  - Assist with mobility/ambulation  - Relieve pressure over bony prominences  - Avoid friction and shearing  - Provide appropriate hygiene as needed including keeping skin clean and dry  - Evaluate need for skin moisturizer/barrier cream  - Collaborate with interdisciplinary team   - Patient/family teaching  - Consider wound care consult   5/12/2024 1405 by Monserrat Vasquez RN  Outcome: Adequate for Discharge  5/12/2024 1405 by Monserrat Vasquez RN  Outcome: Progressing  5/12/2024 0717 by Monserrat Vasquez, RN  Outcome: Progressing

## 2024-05-12 NOTE — PROGRESS NOTES
Progress Note - Cardiology   Vesna Langston 64 y.o. female MRN: [id number]  Unit/Bed#: S -01 Encounter: [id number]    Assessment:  Principal Problem:    Acute appendicitis with localized peritonitis and gangrene, without perforation or abscess  Active Problems:    Atrial flutter, paroxysmal (HCC)    Hyperlipemia    Ischemic cardiomyopathy    Chronic low back pain    HFrEF (heart failure with reduced ejection fraction) (HCC)    Coronary artery disease involving native coronary artery of native heart    Acquired hypothyroidism    Generalized anxiety disorder    Acute pulmonary embolism without acute cor pulmonale (HCC)    History of gastric ulcer    64-year-old female with a cardiac history of coronary artery disease with most recent PCI in March with VT at that time. ICD placed. Ischemic cardiomyopathy with an ejection fraction 30%. Paroxysmal atrial fibrillation/flutter. Presented with acute appendicitis. Now status post appendectomy 5/8.    Plan:  Coronary artery disease:  On Plavix. On heparin infusion for now. Should restart Eliquis as she is discharged.    Ischemic cardiomyopathy:  Ejection fraction 30 to 35%. Required IV Lasix during hospitalization likely after fluid resuscitation. She has p.o. Lasix available to her and I reviewed the parameters for her to take. Initiate Entresto today. Continue metoprolol.    Ventricular tachycardia:  No recent episodes noted here. ICD is in place. Continue amiodarone, metoprolol.    Paroxysmal atrial fibrillation:  In sinus rhythm. Amiodarone, metoprolol. On anticoagulation currently with heparin infusion. Typically on Eliquis, which is going to be restarted on discharge    PE:  Anticoagulation as above. Somewhat incidentally found on the CT scan done on 5/7    Okay for discharge from cardiac standpoint. Outpatient follow-up as scheduled.    Subjective/Objective     Subjective:  No significant complaints. She seemed to respond well to the Lasix she received yesterday.  "Able to tolerate diet. Had bowel movement.    Objective:    Vitals: /68   Pulse 74   Temp 98.3 °F (36.8 °C)   Resp 16   Ht 5' 8\" (1.727 m)   Wt 95.3 kg (210 lb 1.6 oz)   SpO2 92%   BMI 31.95 kg/m²   Vitals:    05/11/24 0512 05/12/24 0600   Weight: 95.7 kg (210 lb 15.7 oz) 95.3 kg (210 lb 1.6 oz)     Orthostatic Blood Pressures      Flowsheet Row Most Recent Value   Blood Pressure 117/68 filed at 05/12/2024 0717   Patient Position - Orthostatic VS Sitting filed at 05/10/2024 0742          No intake or output data in the 24 hours ending 05/12/24 1010    Physical Exam:  GEN: Vesna Langston appears well, alert and oriented x 3, pleasant and cooperative   HEENT: pupils equal, round, and reactive to light; extraocular muscles intact  NECK: supple, no carotid bruits   HEART: regular rhythm, normal S1 and S2, no murmurs, clicks, gallops or rubs   LUNGS: minimal basilar rales.  ABDOMEN: mildly tender.  EXTREMITIES: minimal LE edema  NEURO: no focal findings   SKIN: normal without suspicious lesions on exposed skin    Medications:    Current Facility-Administered Medications:     acetaminophen (TYLENOL) tablet 650 mg, 650 mg, Oral, Q6H PRN, Jason Abraham MD, 650 mg at 05/11/24 2111    albuterol (PROVENTIL HFA,VENTOLIN HFA) inhaler 2 puff, 2 puff, Inhalation, Q6H PRN, Jason Abraham MD    amiodarone tablet 200 mg, 200 mg, Oral, Daily With Breakfast, Jason Abraham MD, 200 mg at 05/12/24 0854    atorvastatin (LIPITOR) tablet 80 mg, 80 mg, Oral, QPM, Jason Abraham MD, 80 mg at 05/11/24 1747    bisacodyl (DULCOLAX) rectal suppository 10 mg, 10 mg, Rectal, BID PRN, Maury Ortiz MD    clopidogrel (PLAVIX) tablet 75 mg, 75 mg, Oral, Daily, Yina Gunn PA-C, 75 mg at 05/12/24 0854    escitalopram (LEXAPRO) tablet 10 mg, 10 mg, Oral, Daily, Yina Gunn PA-C, 10 mg at 05/12/24 0859    gabapentin (NEURONTIN) capsule 100 mg, 100 mg, Oral, BID, Yina Gunn PA-C, 100 mg at 05/10/24 1808    " heparin (porcine) 25,000 units in 0.45% NaCl 250 mL infusion (premix), 3-20 Units/kg/hr (Order-Specific), Intravenous, Titrated, Billy Colin DO, Last Rate: 15.4 mL/hr at 05/11/24 2114, 17.1 Units/kg/hr at 05/11/24 2114    HYDROmorphone (DILAUDID) injection 0.5 mg, 0.5 mg, Intravenous, Q3H PRN, Jason Abraham MD, 0.5 mg at 05/07/24 1634    insulin lispro (HumALOG/ADMELOG) 100 units/mL subcutaneous injection 1-6 Units, 1-6 Units, Subcutaneous, TID AC, 1 Units at 05/10/24 1709 **AND** Fingerstick Glucose (POCT), , , TID AC, Jason Abraham MD    levothyroxine tablet 50 mcg, 50 mcg, Oral, Daily, Yina Gunn PA-C, 50 mcg at 05/12/24 0854    meclizine (ANTIVERT) tablet 25 mg, 25 mg, Oral, Q8H PRN, Yina Gunn PA-C    metoprolol tartrate (LOPRESSOR) partial tablet 12.5 mg, 12.5 mg, Oral, Q12H MONA, Farooq Nguyen MD, 12.5 mg at 05/12/24 0854    oxyCODONE (ROXICODONE) immediate release tablet 10 mg, 10 mg, Oral, Q4H PRN, Jason Abraham MD, 10 mg at 05/11/24 2111    oxyCODONE (ROXICODONE) IR tablet 5 mg, 5 mg, Oral, Q4H PRN, Jason Abraham MD, 5 mg at 05/11/24 0522    pantoprazole (PROTONIX) EC tablet 40 mg, 40 mg, Oral, Daily Before Breakfast, Billy Colin DO, 40 mg at 05/12/24 0458    piperacillin-tazobactam (ZOSYN) 4.5 g in sodium chloride 0.9 % 100 mL IV LOADING DOSE, 4.5 g, Intravenous, Once, Held at 05/10/24 0937 **FOLLOWED BY** piperacillin-tazobactam (ZOSYN) 4.5 g in sodium chloride 0.9 % 100 mL IVPB (EXTENDED INFUSION), 4.5 g, Intravenous, Q8H, Maury Ortiz MD, Last Rate: 25 mL/hr at 05/12/24 0458, 4.5 g at 05/12/24 0458    polyethylene glycol (MIRALAX) packet 17 g, 17 g, Oral, BID, Maury Ortiz MD, 17 g at 05/12/24 0853    sacubitril-valsartan (ENTRESTO) 24-26 MG per tablet 1 tablet, 1 tablet, Oral, BID, Farooq Nguyen MD, 1 tablet at 05/12/24 0853    senna-docusate sodium (SENOKOT S) 8.6-50 mg per tablet 2 tablet, 2 tablet, Oral, BID, Maury Ortiz MD, 2 tablet  at 05/12/24 0853    simethicone (MYLICON) chewable tablet 80 mg, 80 mg, Oral, Q6H PRN, Yina Gunn PA-C    sucralfate (CARAFATE) tablet 1 g, 1 g, Oral, 4x Daily (AC & HS), Yina Gunn PA-C, 1 g at 05/12/24 0458    trimethobenzamide (TIGAN) IM injection 200 mg, 200 mg, Intramuscular, Q6H PRN, Jason Abraham MD, 200 mg at 05/10/24 0848    Lab Results:      Results from last 7 days   Lab Units 05/12/24  0457 05/11/24  0515 05/10/24  0336   WBC Thousand/uL 8.63 8.09 18.27*   HEMOGLOBIN g/dL 9.0* 8.9* 9.1*   HEMATOCRIT % 28.8* 28.9* 29.4*   PLATELETS Thousands/uL 258 231 214         Results from last 7 days   Lab Units 05/12/24 0457 05/11/24 0515 05/10/24  0336 05/08/24  0612 05/07/24  0647   SODIUM mmol/L 137 139 138   < > 138   POTASSIUM mmol/L 3.8 3.5 4.3   < > 3.9   CHLORIDE mmol/L 104 103 103   < > 103   CO2 mmol/L 27 29 29   < > 24   BUN mg/dL 9 8 12   < > 13   CREATININE mg/dL 0.73 0.70 0.70   < > 0.79   CALCIUM mg/dL 8.7 8.5 8.7   < > 9.5   ALK PHOS U/L  --   --   --   --  44   ALT U/L  --   --   --   --  23   AST U/L  --   --   --   --  23    < > = values in this interval not displayed.     Results from last 7 days   Lab Units 05/12/24  0457 05/11/24  0132 05/10/24  1804 05/07/24  2031 05/07/24  1247   INR   --   --   --   --  1.08   PTT seconds 79* 72* 63*   < > 27    < > = values in this interval not displayed.     Results from last 7 days   Lab Units 05/11/24 0515 05/09/24  0551 05/08/24  0612   MAGNESIUM mg/dL 2.0 2.1 1.7*     Telemetry: removed    Echo:  Left Ventricle: Left ventricular cavity size is normal. The left ventricular ejection fraction is 30%. Systolic function is severely reduced.    The following segments are akinetic: apical anterior, apical septal and apex.    The following segments are hypokinetic: mid anteroseptal, mid inferoseptal, apical inferior and apical lateral.    All other segments are normal.    Right Ventricle: Systolic function is normal. Wall motion is normal.     Right Atrium: The atrium is normal in size.  Mobile density could represent thrombus versus [employer] network    Tricuspid Valve: There is mild regurgitation. The estimated right ventricular systolic pressure is 25.00 mmHg.

## 2024-05-13 ENCOUNTER — TELEPHONE (OUTPATIENT)
Dept: PULMONOLOGY | Facility: HOSPITAL | Age: 64
End: 2024-05-13

## 2024-05-13 ENCOUNTER — TRANSITIONAL CARE MANAGEMENT (OUTPATIENT)
Dept: FAMILY MEDICINE CLINIC | Facility: CLINIC | Age: 64
End: 2024-05-13

## 2024-05-13 LAB
DME PARACHUTE DELIVERY DATE ACTUAL: NORMAL
DME PARACHUTE DELIVERY DATE REQUESTED: NORMAL
DME PARACHUTE ITEM DESCRIPTION: NORMAL
DME PARACHUTE ORDER STATUS: NORMAL
DME PARACHUTE SUPPLIER NAME: NORMAL
DME PARACHUTE SUPPLIER PHONE: NORMAL

## 2024-05-13 NOTE — DISCHARGE SUMMARY
Discharge Summary - Vesna Langston 64 y.o. female MRN: [id number]    Unit/Bed#: S -01 Encounter: [id number]    Admission Date:   Admission Orders (From admission, onward)       Ordered        24 1005  Inpatient Admission  Once                            Admitting Diagnosis: Abdominal pain [R10.9]  PE (pulmonary thromboembolism) (HCC) [I26.99]  Atrial flutter, paroxysmal (HCC) [I48.92]  Acute appendicitis, unspecified acute appendicitis type [K35.80]  Acute appendicitis with localized peritonitis, without perforation, abscess, or gangrene [K35.30]  Acute pulmonary embolism, unspecified pulmonary embolism type, unspecified whether acute cor pulmonale present (HCC) [I26.99]    HPI:  Vesna Langston is a 64 y.o. female with the above past medical history who presents with abdominal pain which started last night at around 11 PM.  Associated with nausea and vomiting.     Past surgical history significant for  section.  On Eliquis for history of a flutter and Plavix for CAD status post stenting.    Procedures Performed:   2024 [facility] appy    Summary of Hospital Course: Patient was admitted on May 7 with acute appendicitis.  She was initially managed with antibiotics with plan to reevaluate the morning of May 8 to decide if she would get an operation.  On the morning of the , her pain was not improved, and she had persistent leukocytosis.  She was taken to the operating room for laparoscopic appendectomy, which was performed uneventfully.  She was kept in the hospital for 4 days for IV antibiotics.  Note that due to her recent STEMI, cardiology was consulted.  Her Plavix was held the morning of surgery and immediately restarted the following day.  Her Eliquis was held and she was placed on heparin drip for new finding of incidental right lower lobe subsegmental pulmonary embolism.  She recovered well from surgery, tolerating her diet (after briefly back down to clears for some emesis), discharged in good  condition on Matthew, May 12 to continue her Eliquis and Plavix.      Complications: None    Discharge Diagnosis: Acute appendicitis    Medical Problems       Resolved Problems  Date Reviewed: 5/5/2024   None         Condition at Discharge: good         Discharge instructions/Information to patient and family:   See after visit summary for information provided to patient and family.      Provisions for Follow-Up Care:  See after visit summary for information related to follow-up care and any pertinent home health orders.      PCP: Brandon Pete MD    Disposition: Home    Planned Readmission: No      Discharge Statement   I spent 25 minutes discharging the patient. This time was spent on the day of discharge. I had direct contact with the patient on the day of discharge. Additional documentation is required if more than 30 minutes were spent on discharge.     Discharge Medications:  See after visit summary for reconciled discharge medications provided to patient and family.

## 2024-05-14 PROCEDURE — 88304 TISSUE EXAM BY PATHOLOGIST: CPT | Performed by: PATHOLOGY

## 2024-05-14 NOTE — UTILIZATION REVIEW
NOTIFICATION OF ADMISSION DISCHARGE   This is a Notification of Discharge from [facility]. Please be advised that this patient has been discharge from our facility. Below you will find the admission and discharge date and time including the patient’s disposition.   UTILIZATION REVIEW CONTACT:  Nidhi Victor  Utilization   Network Utilization Review Department  Phone: [phone] x carefully listen to the prompts. All voicemails are confidential.  Email: [email or web address]     ADMISSION INFORMATION  PRESENTATION DATE: 5/7/2024  6:35 AM  OBERVATION ADMISSION DATE:   INPATIENT ADMISSION DATE: 5/7/24 10:05 AM   DISCHARGE DATE: 5/12/2024  2:06 PM   DISPOSITION:Home/Self Care    Network Utilization Review Department  ATTENTION: Please call with any questions or concerns to [phone] and carefully listen to the prompts so that you are directed to the right person. All voicemails are confidential.   For Discharge needs, contact Care Management DC Support Team at [phone] opt. 2  Send all requests for admission clinical reviews, approved or denied determinations and any other requests to dedicated fax number below belonging to the campus where the patient is receiving treatment. List of dedicated fax numbers for the Facilities:  FACILITY NAME UR FAX NUMBER   ADMISSION DENIALS (Administrative/Medical Necessity) [phone]   DISCHARGE SUPPORT TEAM ([facility]) [phone]   PARENT CHILD HEALTH (Maternity/NICU/Pediatrics) [phone]   [facility] [phone]   [facility] [phone]   [facility] [phone]   [facility] [phone]   [facility] [phone]   [facility] [phone]   [facility] [phone]   [facility] [phone]    [facility] [phone]   [facility] [phone]   [facility] [phone]   [facility] [phone]

## 2024-05-16 ENCOUNTER — TELEPHONE (OUTPATIENT)
Dept: CARDIAC REHAB | Facility: CLINIC | Age: 64
End: 2024-05-16

## 2024-05-16 DIAGNOSIS — I50.20 HFREF (HEART FAILURE WITH REDUCED EJECTION FRACTION) (HCC): Primary | ICD-10-CM

## 2024-05-17 ENCOUNTER — TELEPHONE (OUTPATIENT)
Dept: CARDIOLOGY CLINIC | Facility: CLINIC | Age: 64
End: 2024-05-17

## 2024-05-17 ENCOUNTER — LAB (OUTPATIENT)
Dept: LAB | Facility: CLINIC | Age: 64
End: 2024-05-17
Payer: COMMERCIAL

## 2024-05-17 DIAGNOSIS — D62 ACUTE BLOOD LOSS ANEMIA: ICD-10-CM

## 2024-05-17 DIAGNOSIS — I50.22 CHRONIC HFREF (HEART FAILURE WITH REDUCED EJECTION FRACTION) (HCC): ICD-10-CM

## 2024-05-17 DIAGNOSIS — K25.9 MULTIPLE GASTRIC ULCERS: ICD-10-CM

## 2024-05-17 LAB
ANION GAP SERPL CALCULATED.3IONS-SCNC: 8 MMOL/L (ref 4–13)
BUN SERPL-MCNC: 11 MG/DL (ref 5–25)
CALCIUM SERPL-MCNC: 9.1 MG/DL (ref 8.4–10.2)
CHLORIDE SERPL-SCNC: 104 MMOL/L (ref 96–108)
CO2 SERPL-SCNC: 26 MMOL/L (ref 21–32)
CREAT SERPL-MCNC: 0.74 MG/DL (ref 0.6–1.3)
GFR SERPL CREATININE-BSD FRML MDRD: 84 ML/MIN/1.73SQ M
GLUCOSE P FAST SERPL-MCNC: 115 MG/DL (ref 65–99)
POTASSIUM SERPL-SCNC: 4 MMOL/L (ref 3.5–5.3)
SODIUM SERPL-SCNC: 138 MMOL/L (ref 135–147)

## 2024-05-17 PROCEDURE — 36415 COLL VENOUS BLD VENIPUNCTURE: CPT

## 2024-05-17 PROCEDURE — 80048 BASIC METABOLIC PNL TOTAL CA: CPT

## 2024-05-17 NOTE — RESULT ENCOUNTER NOTE
Please notify pt her labs are acceptable. No changes to plan now, will discuss medication plans near term at upcoming 5/2024 visit after more time convalescing post operatively.      Thanks!    - Vitor

## 2024-05-17 NOTE — TELEPHONE ENCOUNTER
Caller: Vesna    Doctor: Arabella    Reason for call: Pt calling in about whether or not she should start Jardiance. She is requesting that a detailed message be left if she cannot answer.     Call back#: [phone]

## 2024-05-17 NOTE — TELEPHONE ENCOUNTER
Called patient and told her not to start jardiance yet that Dr. Bell will talk about it at next appointment.

## 2024-05-17 NOTE — TELEPHONE ENCOUNTER
Called patient and left message: her labs are acceptable. No changes to plan now, will discuss medication plans near term at upcoming 5/2024 visit after more time convalescing post operatively.      ----- Message from Vitor Bell MD sent at 5/17/2024 11:34 AM EDT -----  Please notify pt her labs are acceptable. No changes to plan now, will discuss medication plans near term at upcoming 5/2024 visit after more time convalescing post operatively.      Thanks!    - Vitor

## 2024-05-20 ENCOUNTER — OFFICE VISIT (OUTPATIENT)
Dept: SURGERY | Facility: CLINIC | Age: 64
End: 2024-05-20
Payer: COMMERCIAL

## 2024-05-20 DIAGNOSIS — K35.31 ACUTE APPENDICITIS WITH LOCALIZED PERITONITIS AND GANGRENE, WITHOUT PERFORATION OR ABSCESS: Primary | ICD-10-CM

## 2024-05-20 PROCEDURE — 99213 OFFICE O/P EST LOW 20 MIN: CPT | Performed by: SURGERY

## 2024-05-20 NOTE — ASSESSMENT & PLAN NOTE
- Patient is s/p laparoscopic appendectomy on 5/8  -She denies fever/chills, chest pains, shortness of breath.  Ambulating without issue.    -Follow-up with us in surgical clinic only on an as-needed basis.

## 2024-05-20 NOTE — PROGRESS NOTES
Ambulatory Visit  Name: Vesna Langston      : 1960      MRN: [id number]  Encounter Provider: General Surgery Generic Physician Doug  Encounter Date: 2024   Encounter department: [facility] SURGERY [place]    Assessment & Plan   1. Acute appendicitis with localized peritonitis and gangrene, without perforation or abscess  Assessment & Plan:  - Patient is s/p laparoscopic appendectomy on   -She denies fever/chills, chest pains, shortness of breath.  Ambulating without issue.    -Follow-up with us in surgical clinic only on an as-needed basis.      History of Present Illness     Vesna Langston is a 64 y.o. female who presents following laparoscopic appendectomy on 2024 for acute appendicitis.  She is doing well following this procedure.  She is tolerating diet, ambulating without issue.  She has no obstructive symptoms.  She denies fever/chills, chest pains, shortness of breath.  We discussed plans for follow-up in surgical clinic only on an as-needed basis and patient was agreeable to the plan.    Review of Systems   All other systems reviewed and are negative.      Objective     There were no vitals taken for this visit.    Physical Exam  Constitutional:       Appearance: Normal appearance.   HENT:      Head: Normocephalic and atraumatic.   Eyes:      Pupils: Pupils are equal, round, and reactive to light.   Cardiovascular:      Rate and Rhythm: Normal rate.      Pulses: Normal pulses.   Pulmonary:      Effort: Pulmonary effort is normal.   Abdominal:      General: There is no distension.      Palpations: Abdomen is soft.      Tenderness: There is no abdominal tenderness.      Comments: Surgical incision C/C/I and well-approximated without signs of infection.  No peritoneal signs   Musculoskeletal:      Cervical back: Normal range of motion.   Skin:     Capillary Refill: Capillary refill takes less than 2 seconds.   Neurological:      General: No focal deficit present.      Mental Status:  She is alert and oriented to person, place, and time.   Psychiatric:         Mood and Affect: Mood normal.         Behavior: Behavior normal.       Administrative Statements

## 2024-05-24 ENCOUNTER — OFFICE VISIT (OUTPATIENT)
Dept: FAMILY MEDICINE CLINIC | Facility: CLINIC | Age: 64
End: 2024-05-24
Payer: COMMERCIAL

## 2024-05-24 VITALS
HEIGHT: 68 IN | DIASTOLIC BLOOD PRESSURE: 72 MMHG | WEIGHT: 205 LBS | BODY MASS INDEX: 31.07 KG/M2 | SYSTOLIC BLOOD PRESSURE: 118 MMHG

## 2024-05-24 DIAGNOSIS — E11.9 TYPE 2 DIABETES MELLITUS, WITHOUT LONG-TERM CURRENT USE OF INSULIN (HCC): ICD-10-CM

## 2024-05-24 DIAGNOSIS — K35.31 ACUTE APPENDICITIS WITH LOCALIZED PERITONITIS AND GANGRENE, WITHOUT PERFORATION OR ABSCESS: Primary | ICD-10-CM

## 2024-05-24 DIAGNOSIS — Z95.810 S/P ICD (INTERNAL CARDIAC DEFIBRILLATOR) PROCEDURE: Chronic | ICD-10-CM

## 2024-05-24 DIAGNOSIS — Z12.31 ENCOUNTER FOR SCREENING MAMMOGRAM FOR BREAST CANCER: ICD-10-CM

## 2024-05-24 DIAGNOSIS — K92.0 COFFEE GROUND EMESIS: ICD-10-CM

## 2024-05-24 DIAGNOSIS — I25.10 CORONARY ARTERY DISEASE INVOLVING NATIVE CORONARY ARTERY OF NATIVE HEART WITHOUT ANGINA PECTORIS: Chronic | ICD-10-CM

## 2024-05-24 DIAGNOSIS — Z78.0 ASYMPTOMATIC POSTMENOPAUSAL STATE: ICD-10-CM

## 2024-05-24 PROBLEM — R09.89 SYMPTOMS OF UPPER RESPIRATORY INFECTION (URI): Status: RESOLVED | Noted: 2023-03-04 | Resolved: 2024-05-24

## 2024-05-24 PROCEDURE — 99495 TRANSJ CARE MGMT MOD F2F 14D: CPT | Performed by: FAMILY MEDICINE

## 2024-05-24 NOTE — PROGRESS NOTES
Transition of Care Visit  Name: Vesna Langston      : 1960      MRN: [id number]  Encounter Provider: Brandon Pete MD  Encounter Date: 2024   Encounter department: [facility] PRIMARY CARE    Assessment & Plan   1. Acute appendicitis with localized peritonitis and gangrene, without perforation or abscess  Assessment & Plan:  Reviewed hospital reports and reconciled medication.  Overall seems to have done very well post surgery  2. Encounter for screening mammogram for breast cancer  -     Mammo screening bilateral w 3d & cad; Future; Expected date: 2024  3. Type 2 diabetes mellitus, without long-term current use of insulin (HCC)  Assessment & Plan:  Showing good control with dietary changes.  Will recheck the A1c in  3 months after the last A1c  Lab Results   Component Value Date    HGBA1C 7.9 (H) 2024     Orders:  -     Hemoglobin A1C; Future  4. Asymptomatic postmenopausal state  5. S/P ICD (internal cardiac defibrillator) procedure  Assessment & Plan:  Is under some physical restrictions with this but seems to be doing well.  Continue overall follow-up  6. Coffee ground emesis  Assessment & Plan:  Is now taking the protonic and the Carafate and should continue but does not appear to have any symptoms of gastric irritation at this point  7. Coronary artery disease involving native coronary artery of native heart without angina pectoris  Assessment & Plan:  Appears stable, continue current regimen and follow-up         History of Present Illness     Transitional Care Management Review:   Vesna Langston is a 64 y.o. female here for TCM follow up.     During the TCM phone call patient stated:  TCM Call       Type Value    Date and time call was made  2024  8:20 AM    Hospital care reviewed  Records reviewed    Patient was hospitialized at  [facility]    Date of Admission  24    Date of discharge  20    Diagnosis  appendicitis    Disposition   Home    Were the patients medications reviewed and updated  No    Current Symptoms  None          TCM Call       Type Value    Post hospital issues  None    Should patient be enrolled in anticoag monitoring?  No    Scheduled for follow up?  Yes    Did you obtain your prescribed medications  Yes    Do you need help managing your prescriptions or medications  No    Is transportation to your appointment needed  No    I have advised the patient to call PCP with any new or worsening symptoms  Mary Jo Britt CMA    Living Arrangements  Alone            Patient had 3 recent hospitalizations the first for MI then for problems with syncope and was found to be in atrial flutter.  Did have pacemaker placed with defibrillator.  Last hospitalization was for GI blood loss and was found to have severe ulcerations in the stomach.  Is on protonic at this time as well as Carafate and no longer having pain.  Was started on levothyroxine for problems with hypothyroidism while in the hospital.  Is following up with cardiology and interventional cardiology in [place] and is going to need to be started on cardiac rehab which is set up for evaluation in the second week of May.  Patient had developed problems with abdominal pain and was hospitalized for acute appendicitis and had appendectomy.  The pain is resolved.  Did have problems with vomiting prior to hospitalization with blood and is on protonic and Carafate and is not having any abdominal pain.  Has been watching her diet closely and fasting sugars have been in the low 100 range for her diabetic control.  Is not taking the Jardiance.  Seen today for transition of care      Review of Systems   Constitutional:  Negative for fatigue and fever.   HENT:  Negative for congestion.    Eyes:  Negative for visual disturbance.   Respiratory:  Negative for cough, chest tightness and shortness of breath.    Cardiovascular:  Negative for chest pain, palpitations and leg swelling.  "  Gastrointestinal:  Positive for abdominal pain (In the left lower incision area that is mild). Negative for constipation and diarrhea.   Endocrine: Negative for polyuria.   Allergic/Immunologic: Positive for environmental allergies.   Neurological:  Negative for dizziness and light-headedness.   Psychiatric/Behavioral:  Negative for sleep disturbance.      Objective     Blood Pressure 118/72 (BP Location: Left arm, Patient Position: Sitting, Cuff Size: Large)   Height 5' 8\" (1.727 m)   Weight 93 kg (205 lb)   Body Mass Index 31.17 kg/m²     Physical Exam  Vitals and nursing note reviewed.   Constitutional:       Appearance: Normal appearance. She is not ill-appearing.   HENT:      Head: Normocephalic.      Nose: Nose normal.      Mouth/Throat:      Mouth: Mucous membranes are moist.   Eyes:      Conjunctiva/sclera: Conjunctivae normal.   Neck:      Vascular: No carotid bruit.   Cardiovascular:      Rate and Rhythm: Normal rate and regular rhythm.      Pulses: Normal pulses.      Heart sounds: Normal heart sounds. No murmur (Rate is 66) heard.  Pulmonary:      Effort: Pulmonary effort is normal.      Breath sounds: Normal breath sounds.   Abdominal:      General: Abdomen is flat. There is no distension.      Palpations: Abdomen is soft. There is no mass.      Tenderness: There is no abdominal tenderness.   Musculoskeletal:         General: No swelling.      Cervical back: Normal range of motion. No tenderness.      Right lower leg: No edema.      Left lower leg: No edema.   Lymphadenopathy:      Cervical: No cervical adenopathy.   Skin:     General: Skin is warm and dry.      Findings: No rash.   Neurological:      General: No focal deficit present.      Mental Status: She is alert and oriented to person, place, and time.      Motor: No weakness.      Coordination: Coordination normal.      Gait: Gait normal.      Deep Tendon Reflexes: Reflexes abnormal (Reflexes diminished).   Psychiatric:         Mood and " Affect: Mood normal.         Behavior: Behavior normal.         Thought Content: Thought content normal.         Judgment: Judgment normal.       Medications have been reviewed by provider in current encounter    Administrative Statements

## 2024-05-24 NOTE — ASSESSMENT & PLAN NOTE
Is now taking the protonic and the Carafate and should continue but does not appear to have any symptoms of gastric irritation at this point

## 2024-05-24 NOTE — ASSESSMENT & PLAN NOTE
Reviewed hospital reports and reconciled medication.  Overall seems to have done very well post surgery

## 2024-05-24 NOTE — ASSESSMENT & PLAN NOTE
Is under some physical restrictions with this but seems to be doing well.  Continue overall follow-up

## 2024-05-24 NOTE — ASSESSMENT & PLAN NOTE
Showing good control with dietary changes.  Will recheck the A1c in June 3 months after the last A1c  Lab Results   Component Value Date    HGBA1C 7.9 (H) 03/22/2024

## 2024-05-24 NOTE — PATIENT INSTRUCTIONS
Overall seems to have done very well after the appendectomy.  Will continue all meds at this time.  Continue to watch the starch in the diet

## 2024-05-28 ENCOUNTER — HOSPITAL ENCOUNTER (OUTPATIENT)
Dept: RADIOLOGY | Facility: CLINIC | Age: 64
Discharge: HOME | End: 2024-05-28
Payer: COMMERCIAL

## 2024-05-28 VITALS — WEIGHT: 202 LBS | BODY MASS INDEX: 30.62 KG/M2 | HEIGHT: 68 IN

## 2024-05-28 DIAGNOSIS — Z12.31 ENCOUNTER FOR SCREENING MAMMOGRAM FOR BREAST CANCER: ICD-10-CM

## 2024-05-28 PROCEDURE — 77067 SCR MAMMO BI INCL CAD: CPT

## 2024-05-28 PROCEDURE — 77063 BREAST TOMOSYNTHESIS BI: CPT

## 2024-05-29 NOTE — PROGRESS NOTES
CARDIAC REHABILITATION ASSESSMENT AND INDIVIDUALIZED TREATMENT PLAN  INITIAL       Today's date: 2024   # of Exercise Sessions Completed: 1  Patient name: Vesna Langston      : 1960  Age: 64 y.o.       MRN: [id number]  Referring Physician: Vitor Bell MD  Cardiologist: Vitor Bell MD  Provider: [place]  Clinician: Velia Villalta MS, CEP      Dr. Bell,   Please review the following patient assessment for Vesna to begin cardiac rehabilitation post STEMI/stent and HFrEF (30% EF).  Please verify you agree with the outlined plan of care and exercise prescription by signing with attached order.    Thank You.      Comments: Today is Vesna's initial evaluation to begin Cardiac Rehab now 10 wks post syncope x3, Aflutter, VT requiring defibrillation, STEMI/stent to 100%  of ostLAD, and ICD placement. Threes days post hospital discharge, Vesna returned to the hospital due to syncope during a bowel movement attempt. She does report SOB since having Covid 3/24 and no SOB since event. Vesna has a history of HLD, sciatica pain, chronic back pain (SI joint injection 3/22/24), DM II (110-125), Smoking (quit 3/22/24), Anxiety and recent appendicitis (s/p appendectomy 24). She does currently follow a formal exercise program at home, including walking 1 mile each day with her cane. At PT, she reports walking on the treadmill 10-20 min with supervision. She is also working on core strength and stretching at PT (due to loose screws in her back). Her weight limit is at 10 lb right now.  She has resumed most ADLs, limited by back injury. Today, Vesna completed an initial 6MWT with cane. We also discussed current dietary habits and goals of heart healthy eating. She has been reading more food labels, reducing sodium, and increasing fresh fruits and vegetables. Depression and anxiety were assessed with PHQ-9 and BECCA-7 questionnaires with scores of 8 and 7 respectively, suggesting  5-9 = Mild Depression and  5-9 = Mild  anxiety. When addressed, Vesna admits to having depression symptoms since hospital discharge and reports good social/emotional support.      Dx:   Encounter Diagnoses   Name Primary?    Chronic systolic heart failure ([facility]) Yes    STEMI (ST elevation myocardial infarction) ([facility])     S/P ICD (internal cardiac defibrillator) procedure     ST elevation myocardial infarction involving left anterior descending (LAD) coronary artery (Former[facility])     HFrEF (heart failure with reduced ejection fraction) (Formerly [facility])        Description of Diagnosis: S/p MI in 03/2024; received PCI to 100% stenosis of ostial/proximal LAD.     Date of onset: 3/22/24    Other Cardiac History: HFrEF, LVEF 30%; Atrial flutter; monomorphic ventricular tachycardia - S/p secondary prevention ICD (3/27/24)        ASSESSMENT    Medical History:   Past Medical History:   Diagnosis Date    Acute respiratory insufficiency 03/22/2024    Allergic     Chronic HFrEF (heart failure with reduced ejection fraction) ([facility])     Coronary artery disease     COVID-19 virus infection 11/28/2022    Diabetes mellitus ([facility])     Disease of thyroid gland 4/09/2024    Hyperlipidemia     Hypoglycemia     ICD (implantable cardioverter-defibrillator) in place     Ischemic cardiomyopathy     Lactic acidosis 03/22/2024    Myocardial infarction ([facility]) 3/22/2024    Otitis media 1966    ST elevation myocardial infarction involving left anterior descending (LAD) coronary artery ([facility]) 03/22/2024    Tobacco abuse     Visual impairment 1967       Family History:  Family History   Adopted: Yes   Problem Relation Age of Onset    Depression Mother     Heart disease Father     OCD Daughter        Allergies:   Shellfish-derived products - food allergy and Azithromycin    Current Medications:   Current Outpatient Medications   Medication Sig Dispense Refill    albuterol (ProAir HFA) 90 mcg/act inhaler Inhale 2 puffs every 6 (six) hours as needed for wheezing 8.5 g 0    amiodarone 200 mg tablet Take 1 tablet  (200 mg total) by mouth daily      apixaban (ELIQUIS) 5 mg Take 1 tablet (5 mg total) by mouth 2 (two) times a day 60 tablet 2    atorvastatin (LIPITOR) 80 mg tablet Take 1 tablet (80 mg total) by mouth every evening 30 tablet 2    clopidogrel (PLAVIX) 75 mg tablet Take 1 tablet (75 mg total) by mouth daily 30 tablet 2    Empagliflozin (Jardiance) 25 MG TABS Take 1 tablet (25 mg total) by mouth every morning (Patient not taking: Reported on 5/6/2024) 90 tablet 5    escitalopram (LEXAPRO) 10 mg tablet Take 1 tablet (10 mg total) by mouth daily 30 tablet 5    furosemide (LASIX) 20 mg tablet Take 1 tablet (20 mg total) by mouth as needed (for rapid weight gain (3+ lbs overnight or 5+ lbs in 5-7 days) or worsening shortness of breath) 30 tablet 1    gabapentin (Neurontin) 100 mg capsule Take 1 capsule (100 mg total) by mouth 2 (two) times a day      HYDROcodone-acetaminophen (NORCO) 5-325 mg per tablet Take 1 tablet by mouth every 6 (six) hours as needed for pain for up to 10 doses Max Daily Amount: 4 tablets 10 tablet 0    levothyroxine (Synthroid) 50 mcg tablet Take 1 tablet (50 mcg total) by mouth daily 30 tablet 5    meclizine (ANTIVERT) 25 mg tablet Take 1 tablet (25 mg total) by mouth every 8 (eight) hours as needed for dizziness 30 tablet 0    metoprolol succinate (TOPROL-XL) 25 mg 24 hr tablet Take 1 tablet (25 mg total) by mouth daily at bedtime 90 tablet 5    Multiple Vitamin (MULTIVITAMIN) capsule Take 1 capsule by mouth daily      pantoprazole (PROTONIX) 40 mg tablet Take 1 tablet (40 mg total) by mouth 2 (two) times a day before meals 60 tablet 3    sacubitril-valsartan (Entresto) 24-26 MG TABS Take 1 tablet by mouth 2 (two) times a day (Patient not taking: Reported on 5/6/2024) 180 tablet 5    sucralfate (CARAFATE) 1 g tablet Take 1 tablet (1 g total) by mouth 4 (four) times a day (before meals and at bedtime) 120 tablet 3     No current facility-administered medications for this visit.       Medication  compliance: Yes   Comments: Pt reports to be compliant with medications    Physical Limitations: History of back surgery with complications, PT for 2 yrs now. Recent appendectomy, 10 lb weight restriction    Fall Risk: High   Comments: Reports a fall in the past 6 months (syncope x 4)    Cultural needs: None      CAD Risk Factors:  Cholesterol: Yes  HTN: No  DM: Type 2   Obesity: Yes   Inactivity: Yes      EXERCISE ASSESSMENT:     Initial Fitness Assessment: 6MWT:  Resting:    Resting:  BP: 120/72  HR: 60 bpm, Exercise:  BP: 146/74  HR: 108 bpm, METs:  2.6 (1,075 ft), ECG Summary: NSR, intermittent atrial pacing, Symptoms: very slightly labored breathing with faster walking, recovered quickly, and Comments: walked holding cane due to recommendation from orthopedic surgeon      Functional Status Prior to Diagnosis for Treatment:   Occupation: part time job  at [employer]  Recreation/Physical Activity: gardening, cooking  ADL’s: limited by Orthopedic limitations   [place]: able to perform self-care  Home exercise equipment:  None  Home exercise: walking a lot at work    Current Functional Status:  Occupation: plans to return to work - possibly 4 hours/day once cleared by cardiology  Recreation/Physical Activity: lawn mowing, weedwacker and leaf blowing (5-7 lb); watching grandchildren; cross-stitch; craft wreaths  ADL’s:Capable of performing light ADLs only limited by Orthopedic limitations   [place]: able to perform self-care  Home exercise: walking 1 mile most days of the week    Functional Capacity Screening Tool:  [employer] Activity Status Index:  5.07 METs      PSYCHOSOCIAL ASSESSMENT:    Depression screening:  PHQ-9 = 8    Interpretation:  5-9 = Mild Depression  Anxiety screening:  BECCA-7 = 7    Interpretation: 5-9 = Mild anxiety    Pt self-report of depression and anxiety   Patient reports feelings of depression   Reports sufficient emotional support and is compliant with medical therapy  "(potentially short term)      Self-reported stress level:  5      Quality of Life Screen:  (Higher score indicates disease impact on QOL)  [facility] COOP score: 24/40        Social Support:   children, grandchildren, neighbors, and friends     Psychosocial Assessment as it relates to rehabilitation:   Patient denies issues with his/her family or home life that may affect their rehabilitation efforts.       NUTRITION ASSESSMENT:    Weight:    Wt Readings from Last 1 Encounters:   05/28/24 91.6 kg (202 lb)        Height:   Ht Readings from Last 1 Encounters:   05/28/24 5' 8\" (1.727 m)       Rate Your Plate Score: 73/81    Diabetes: T2D, Patient monitors BS 1 times/day, Patient reported fasting -125  A1c: 7.9%    last measured: 3/22/24    Lipid management: Last lipid profile 4/22/24  Chol 142    HDL 45  LDL 70    Current Dietary Habits:  Reducing sodium, increased fresh fruit and vegetables      OTHER CORE COMPONENT ASSESSMENT:    Tobacco Use:     Pt quit 3/22/24   and has abstained - was smoking 0.5 -1.0 ppd and had cut back and taking Chantix prior to MI    Anginal Symptoms:  SOB   NTG use: No prescription        INDIVIDUALIZED TREATMENT PLAN      Patient will attend 35 monitored exercise sessions beginning Monday, [place] 3.    See outlined plan of care below for specific patient goals in each component of care.        EXERCISE GOAL and PLAN      SMART Goals:   improved DASI score by 10%  increased exercise capacity by 40% based on peak METs tolerated in cardiac rehab exercise session  maintain > 150 minutes per week of moderate intensity exercise  improved 6MWT distance by 10%    Patient Specific EXERCISE GOALS:       attend rehab regularly, start a home exercise program, and improve mobility and exercise tolerance, join [employer]    Progress toward SMART and personal Exercise goals: Patient is agreeable to attend cardiopulmonary rehab exercise sessions 3x/wk x 36 sessions. and pt walking 1 mile each " day and going to PT 2 days/week    Plan for next 30 days:    education on home exercise guidelines and home exercise 30+ mins 2 days opposite CR    The patient was counseled on exercise guidelines to achieve a minimum of 150 mins/wk of moderate intensity (RPE 4-6)   exercise and encouraged to add 1-2 days of exercise on opposite days of cardiac rehab as tolerated.     Current Aerobic Exercise Prescription:      Frequency: 3 days/week   Supplement with home exercise 2+ days/wk as tolerated       Minutes: 20 - 40         METS: 2.0-3.0            HR: RHR +30-40bpm ( bpm)   RPE: 4-6         Modalities: Treadmill, UBE, Lifecycle, NuStep, Recumbent bike, and Room walking     Exercise workloads will be progressed gradually as tolerated, within limits of patient's ability, and according to the patient's   response to the exercise program.      Aerobic Exercise Prescription - 30 day goal:   Frequency: 3 days/week of cardiac rehab       Supplement with home exercise 2+ days/wk as tolerated    Minutes: 40       >150 mins/wk of moderate intensity exercise   METS: 2-3.5   HR: 107-126 bpm (50-70% HRR)     RPE: 4-6   Modalities: Treadmill, UBE, Lifecycle, NuStep, Recumbent bike, and Room walking    Strength training:  Will be added following 2-3 weeks of monitored exercise sessions  With 10 lb restriction and in cooperation with PT   Modalities: Chest Press, Arm Curl, Overhead Press, Sit to Stands, Front Raises, and Calf Raises    Home Exercise: Type: walking, Frequency: 5-7 days/week, Distance 1 mile    Group and Individual Education: benefit of exercise for CAD risk factors, AHA guidelines to achieve >150 mins/wk of moderate exercise, and RPE scale     Readiness to change: Action:  (Changing behavior)      NUTRITION GOALS AND PLAN    Weight control:    Starting weight: 209.8 lb   Current weight: 209.8 lb    Nutritional   Reviewed patient's Rate your Plate. Discussed key elements of heart healthy eating. Reviewed patient  goals for dietary modifications and their clinical implications. Reviewed most recent lipid profile.     SMART Goals:   reduced BMI to < 25, reduced waist circumference <35 inches (F), fasting BG , and Improved Rate Your Plate score  >64    Patient Specific NUTRITION GOALS:     30 lb weight loss    Patient's progress toward SMART and personal Nutrition goals:   Reading food labels, cleaned out pantry - to food drive    Plan for next 30 days:   group Class: Heart Healthy Eating, increase daily intake of fruits and vegetables, and rarely/never eat salty snacks (no seafood - allergic)    Group and Individual Education:   heart healthy eating principles  low sodium diet  nutrition for  lipid management  exercise and diabetes management     Readiness to change: Action:  (Changing behavior)      PSYCHOSOCIAL ASSESSMENT AND PLAN    Psychosocial Assessment as it relates to rehabilitation:   Patient denies issues with his/her family or home life that may affect their rehabilitation efforts.     SMART Goals:     Reduce perceived stress to 1-3/10 and PHQ-9 - reduced severity by one level    Patient Specific PSYCHOCOSOCIAL GOALS:     maintain compliance with medical therapy, spend time with family, and vacation with family end of July    Patient's progress toward SMART and personal Psychosocial goals:   Reviewed goals today    Plan for next 30 days:   Class: Stress and Your Health, Exercise, Spend time outdoors, Keep a positive mindset, Enjoy family, and Return to previous social activity    Group and Individual Education: signs/sxs of depression, benefits of a positive support system, and depression and CAD    Information to utilize [employer] was provided.     Readiness to change: Action:  (Changing behavior)      OTHER CORE COMPONENTS GOALS and PLAN    Blood Pressure will be monitored throughout the program and cardiologist will be notified of elevated trends.    Pt will be encouraged to monitor home BP if advised by  cardiologist.    Tobacco Intervention:   Pt quit 3/22/24 and has abstained since quitting.      SMART Goals:   medication compliance    Patient Specific CORE COMPONENT GOALS:    Improved EF%    Progress toward SMART and personal Core Component goals:    Reviewed goals today    Plan for next 30 days:   group class: Understanding Heart Disease, group class: Common Heart Medications, medication compliance, engage in regular exercise, check labels for sodium content, and monitor home BP    Group and Individual Education:  low sodium diet and heart failure and identifying smoking triggers    Readiness to change: Action:  (Changing behavior)

## 2024-05-29 NOTE — PROGRESS NOTES
"Advanced Heart Failure/Pulmonary Hypertension Outpatient Note - Vesna Langston 64 y.o. female MRN: [id number]    @ Encounter: [id number]    Assessment:  64 y.o. female PMH and acute problems listed later in this note (a partial list may also be included within 'assessment' section) p/w HF fu.  I first met Vesna Langston on 5/6/24.    Primarily ICM, HFrEF, LVEF 30-35%, nondilated LV  LHC 03/22/2024: received PCI to 100% stenosis of ostial/proximal LAD.   cMRI 03/26/2024: LVEF 20%. LVIDd 4.5 cm. \"Transmural scarring of the mid anterior, anteroseptal and inferoseptal walls, the apical anterior, septal and inferior walls and the apex.\"   Coronary artery disease  S/p MI in 03/2024; received PCI to 100% stenosis of ostial/proximal LAD.  History of monomorphic ventricular tachycardia              Per EP notes, felt to be scar mediated.               S/p secondary prevention ICD.               Continues on amiodarone per EP.   Atrial flutter, paroxysmal               [id number] = 5 (age, sex, HF, CAD, DM).              Anticoagulation on Eliquis.               Rhythm control: amiodarone per EP.  Hyperlipidemia  Diabetes mellitus, type II  Chronic back pain  History of tobacco abuse  4/7/24 CT: IMPRESSION:  Suspected small hemorrhage from the anterior wall of the distal gastric body with focal wall thickening. Bleeding due to underlying lesion or PUD is suspected. Recommend endoscopic correlation.  Past heavy NSAID use, 2024 stopped  incidental right lower lobe subsegmental pulmonary embolism 5/2024  Works in [employer], no heavy lifting she says      I have reviewed all pertinent patient data including but not limited to:        Lab Units 05/17/24  0703 05/12/24  0457 05/11/24  0515   CREATININE mg/dL 0.74 0.73 0.70         Lab Results   Component Value Date    K 4.0 05/17/2024     Lab Results   Component Value Date    HGBA1C 7.9 (H) 03/22/2024     Lab Results   Component Value Date    SFE7VJUFCLZD 17.074 (H) 04/22/2024     Lab " "Results   Component Value Date    LDLCALC 70 04/22/2024     Lab Results   Component Value Date     (H) 04/01/2024      No results found for: \"NTBNP\"       TODAY'S PLAN:     05/30/24  Warm, euvolemic  No new cardiac complaints, feels generally well  Active, doing yardwork, no symptoms  5/2024 admit for acute appendicitis - now much improved  Note has not yet started jardiance    Gdmt below  Limited by hypotension, appears to have some room  Gently up metop today and start jardiance; wait 1 week then start MRA; then fu BMP a week after  Fu echo 8/2024 on max gdmt, not yet ordered  Has ICD for VT    On amio per EP    On AC+plavix  On high intens statin    Follow up:  With me in 1 month  In addition to follow up with their other medical providers    Key info from my prior notes:    Follows GI    Thyroids and DM per PCP    Discussed therapeutic lifestyle changes, low-moderate intensity exercise, maintain acceptable hydration 64 oz water daily, salt restrict, Mediterranean vs DASH diet, weight loss  Avoid nsaids    Further review of return to work next week  Safest plan would be no driving x 3 mo - resume 6/2024 if remains stable    Pharmacotherapies / Neurohormonal Blockade:  --Beta Blocker: metoprolol succinate 12.5 mg qHS>25 qd > 50 qd  --ARNi / ACEi / ARB:  (Entresto discontinued during 04/2024 admission due to hypotension). > now back on entresto 24/26 bid  --Aldosterone Antagonist: aldactone 25 qd  --SGLT2 Inhibitor: jardiance 25 qd for HF and DM; discussed risks/benefits/red flags/when to call clinic; no overt contraindications    --Diuretic: PRN Lasix 20 mg  Long discussion about evidence of worsening HF, when to self uptitrate home diuretic and call cardiology office     Sudden Cardiac Death Risk Reduction:  --Medtronic dual chamber ICD in situ since 03/2024 (secondary prevention).   --Interrogation from 04/12/2024:  MDT DUAL ICD/ ACTIVE SYSTEM IS MRI CONDITIONAL   DEVICE INTERROGATED IN THE [place] " OFFICE: BATTERY VOLTAGE ADEQUATE-13 YRS. AP 2%  0%. ALL AVAILABLE LEAD PARAMETERS & TRENDS WITHIN NORMAL LIMITS. 1 DEVICE CLASSIFIED VHR NOTED; NO THERAPIES NEEDED. PT ON AMIO, METO SUCC, & ELIQUIS. EF 35% (ECHO 2024). OPTI-VOL FLUID THRESHOLDS INITIALIZING. NO PROGRAMMING CHANGES MADE TO DEVICE PARAMETERS. WOUND CHECK: INCISION CLEAN AND DRY WITH EDGES APPROXIMATED; AQUACEL REMOVED. WOUND CARE AND RESTRICTIONS REVIEWED WITH PATIENT.   Electrical Resynchronization:  --Candidacy for BiV device: narrow QRS.      Advanced Therapies: Will continue to monitor.    Studies:  I have reviewed all pertinent patient data/labs/imaging where available, including but not limited to the below studies. Selected results may be displayed here but comprehensive listing is omitted for note clarity and can be found in the epic chart.    ECG.    Echo.    Stress.    Cath.    HPI:   64 y.o. female PMH and acute problems listed later in this note (a partial list may also be included within 'assessment' section) p/w HF fu.  I first met Vesna Langston on 5/6/24.  No new CP/SOB/dizziness/palpitations/syncope.  No new fatigue.  No new unintentional weight changes.  No new leg swelling, PND, pillow orthopnea.  No new fevers, chills, cough, nausea, vomiting, diarrhea, dysuria.      Interval History:  As noted in 'plan' section above and prior epic chart notes.    No new CP/SOB/dizziness/palpitations/syncope.  No new fatigue.  No new unintentional weight changes.  No new leg swelling, PND, pillow orthopnea.  No new fevers, chills, cough, nausea, vomiting, diarrhea, dysuria.    Past Medical History:   Diagnosis Date    Acute respiratory insufficiency 03/22/2024    Allergic     Chronic HFrEF (heart failure with reduced ejection fraction) (HCC)     Coronary artery disease     COVID-19 virus infection 11/28/2022    Diabetes mellitus (HCC)     Disease of thyroid gland 4/09/2024    Hyperlipidemia     Hypoglycemia     ICD (implantable  cardioverter-defibrillator) in place     Ischemic cardiomyopathy     Lactic acidosis 03/22/2024    Myocardial infarction ([facility]) 3/22/2024    Otitis media 1966    ST elevation myocardial infarction involving left anterior descending (LAD) coronary artery ([facility]) 03/22/2024    Tobacco abuse     Visual impairment 1967     Patient Active Problem List    Diagnosis Date Noted    Acute appendicitis with localized peritonitis and gangrene, without perforation or abscess 05/08/2024    Acute pulmonary embolism without acute cor pulmonale ([facility]) 05/07/2024    History of gastric ulcer 05/07/2024    Viral upper respiratory tract infection 05/02/2024    Generalized anxiety disorder 05/02/2024    Anemia due to acute blood loss 04/19/2024    Acquired hypothyroidism 04/19/2024    Constipation 04/10/2024    Coffee ground emesis 04/07/2024    Type 2 diabetes mellitus, without long-term current use of insulin ([facility]) 04/07/2024    Syncope 04/01/2024    Guaiac positive stools 04/01/2024    S/P ICD (internal cardiac defibrillator) procedure 03/27/2024    Chronic low back pain 03/25/2024    HFrEF (heart failure with reduced ejection fraction) ([facility]) 03/25/2024    Ischemic cardiomyopathy 03/24/2024    Coronary artery disease involving native coronary artery of native heart 03/23/2024    S/P coronary artery stent placement 03/23/2024    Atrial flutter, paroxysmal ([facility]) 03/22/2024    Hyperlipemia 03/22/2024    New onset type 2 diabetes mellitus ([facility]) 03/22/2024    Tobacco abuse 03/22/2024    History of sustained ventricular tachycardia 03/22/2024    Back pain 07/31/2022    Sciatica of left side 07/31/2022       ROS:  10 point ROS negative except as specified in HPI/interval history    Allergies   Allergen Reactions    Shellfish-Derived Products - Food Allergy Anaphylaxis    Azithromycin Rash     Current Outpatient Medications   Medication Instructions    albuterol (ProAir HFA) 90 mcg/act inhaler 2 puffs, Inhalation, Every 6 hours PRN    amiodarone  200 mg, Oral, Daily    apixaban (ELIQUIS) 5 mg, Oral, 2 times daily    atorvastatin (LIPITOR) 80 mg, Oral, Every evening    clopidogrel (PLAVIX) 75 mg, Oral, Daily    Empagliflozin (JARDIANCE) 25 mg, Oral, Every morning    escitalopram (LEXAPRO) 10 mg, Oral, Daily    furosemide (LASIX) 20 mg, Oral, As needed    gabapentin (NEURONTIN) 100 mg, Oral, 2 times daily    HYDROcodone-acetaminophen (NORCO) 5-325 mg per tablet 1 tablet, Oral, Every 6 hours PRN    levothyroxine (SYNTHROID) 50 mcg, Oral, Daily    meclizine (ANTIVERT) 25 mg, Oral, Every 8 hours PRN    metoprolol succinate (TOPROL-XL) 50 mg, Oral, Daily at bedtime    Multiple Vitamin (MULTIVITAMIN) capsule 1 capsule, Oral, Daily    pantoprazole (PROTONIX) 40 mg, Oral, 2 times daily before meals    sacubitril-valsartan (Entresto) 24-26 MG TABS 1 tablet, Oral, 2 times daily    spironolactone (ALDACTONE) 25 mg, Oral, Daily    sucralfate (CARAFATE) 1 g, Oral, 4 times daily (before meals and at bedtime)      Social History     Socioeconomic History    Marital status:      Spouse name: Not on file    Number of children: 3    Years of education: Not on file    Highest education level: Not on file   Occupational History    Not on file   Tobacco Use    Smoking status: Former     Current packs/day: 0.00     Average packs/day: 1 pack/day for 24.2 years (24.2 ttl pk-yrs)     Types: Cigarettes     Start date: 2000     Quit date: 3/22/2024     Years since quittin.1    Smokeless tobacco: Never    Tobacco comments:     Smoked 0.5-1 ppd; quit in 2024.    Vaping Use    Vaping status: Never Used   Substance and Sexual Activity    Alcohol use: Not Currently     Alcohol/week: 1.0 standard drink of alcohol     Types: 1 Shots of liquor per week     Comment: rarely    Drug use: Never    Sexual activity: Not Currently     Partners: Female     Birth control/protection: Post-menopausal   Other Topics Concern    Not on file   Social History Narrative    Not on file      Social Determinants of Health     Financial Resource Strain: Low Risk  (2/20/2023)    [facility]    Overall Financial Resource Strain (CARDIA)     Difficulty of Paying Living Expenses: Not hard at all   Food Insecurity: No Food Insecurity (4/8/2024)    Hunger Vital Sign     Worried About Running Out of Food in the Last Year: Never true     Ran Out of Food in the Last Year: Never true   Transportation Needs: No Transportation Needs (4/8/2024)    PRAPARE - Transportation     Lack of Transportation (Medical): No     Lack of Transportation (Non-Medical): No   Physical Activity: Not on file   Stress: No Stress Concern Present (2/20/2023)    Received from [facility] [employer] [facility], [facility]    [employer] [place] [employer] - Occupational Stress Questionnaire     Feeling of Stress : Not at all   Social Connections: Moderately Isolated (2/20/2023)    [facility]    Social Connection and Isolation Panel [NHANES]     Frequency of Communication with Friends and Family: More than three times a week     Frequency of Social Gatherings with Friends and Family: Once a week     Attends Buddhist Services: More than 4 times per year     Active Member of Clubs or Organizations: No     Attends Club or Organization Meetings: Never     Marital Status:    Intimate Partner Violence: Not At Risk (2/20/2023)    [facility]    Humiliation, Afraid, Rape, and Kick questionnaire     Fear of Current or Ex-Partner: No     Emotionally Abused: No     Physically Abused: No     Sexually Abused: No   Housing Stability: Low Risk  (4/8/2024)    Housing Stability Vital Sign     Unable to Pay for Housing in the Last Year: No     Number of Places Lived in the Last Year: 2     Unstable Housing in the Last Year: No     Family History  "  Adopted: Yes   Problem Relation Age of Onset    Depression Mother     Heart disease Father     OCD Daughter        Physical Exam:  Vitals:    05/30/24 1124   BP: 116/70   BP Location: Left arm   Patient Position: Sitting   Cuff Size: Standard   Pulse: 85   SpO2: 98%   Weight: 94.3 kg (208 lb)   Height: 5' 8\" (1.727 m)     Constitutional: NAD, non toxic  Ears/nose/mouth/throat: atraumatic  CV: RRR, nl S1S2, no murmurs/rubs/gallups, no JVD, no HJR  Resp: CTABL  GI: Soft, NTND  MSK: no swollen joints in exposed areas  Extr: No edema, warm LE  Pysche: Normal affect  Neuro: appropriate in conversation  Skin: dry and intact in exposed areas    Labs & Results:  Lab Results   Component Value Date    SODIUM 138 05/17/2024    K 4.0 05/17/2024     05/17/2024    CO2 26 05/17/2024    BUN 11 05/17/2024    CREATININE 0.74 05/17/2024    GLUC 102 05/12/2024    CALCIUM 9.1 05/17/2024     Lab Results   Component Value Date    WBC 8.63 05/12/2024    HGB 9.0 (L) 05/12/2024    HCT 28.8 (L) 05/12/2024    MCV 91 05/12/2024     05/12/2024       Counseling / Coordination of Care  Time spent today 28 minutes.  Greater than 50% of total time was spent with the patient and / or family counseling and / or coordination of care.  We discussed diagnoses, most recent studies, tests and any changes in treatment plan.    Thank you for the opportunity to participate in the care of this patient.    Vitor Bell MD  Attending Physician  Advanced Heart Failure and Transplant Cardiology  [facility]  "

## 2024-05-30 ENCOUNTER — OFFICE VISIT (OUTPATIENT)
Dept: CARDIOLOGY CLINIC | Facility: CLINIC | Age: 64
End: 2024-05-30
Payer: COMMERCIAL

## 2024-05-30 ENCOUNTER — CLINICAL SUPPORT (OUTPATIENT)
Dept: CARDIAC REHAB | Facility: CLINIC | Age: 64
End: 2024-05-30
Payer: COMMERCIAL

## 2024-05-30 VITALS
BODY MASS INDEX: 31.52 KG/M2 | DIASTOLIC BLOOD PRESSURE: 70 MMHG | HEART RATE: 85 BPM | SYSTOLIC BLOOD PRESSURE: 116 MMHG | HEIGHT: 68 IN | WEIGHT: 208 LBS | OXYGEN SATURATION: 98 %

## 2024-05-30 DIAGNOSIS — Z95.810 S/P ICD (INTERNAL CARDIAC DEFIBRILLATOR) PROCEDURE: ICD-10-CM

## 2024-05-30 DIAGNOSIS — I21.02 ST ELEVATION MYOCARDIAL INFARCTION INVOLVING LEFT ANTERIOR DESCENDING (LAD) CORONARY ARTERY (HCC): ICD-10-CM

## 2024-05-30 DIAGNOSIS — I21.3 STEMI (ST ELEVATION MYOCARDIAL INFARCTION) (HCC): ICD-10-CM

## 2024-05-30 DIAGNOSIS — R55 SYNCOPE AND COLLAPSE: ICD-10-CM

## 2024-05-30 DIAGNOSIS — I50.20 HFREF (HEART FAILURE WITH REDUCED EJECTION FRACTION) (HCC): Primary | ICD-10-CM

## 2024-05-30 DIAGNOSIS — I48.92 ATRIAL FLUTTER, PAROXYSMAL (HCC): ICD-10-CM

## 2024-05-30 DIAGNOSIS — I50.20 HFREF (HEART FAILURE WITH REDUCED EJECTION FRACTION) (HCC): ICD-10-CM

## 2024-05-30 DIAGNOSIS — I50.22 CHRONIC SYSTOLIC HEART FAILURE (HCC): Primary | ICD-10-CM

## 2024-05-30 DIAGNOSIS — K35.30 ACUTE APPENDICITIS WITH LOCALIZED PERITONITIS, WITHOUT PERFORATION, ABSCESS, OR GANGRENE: ICD-10-CM

## 2024-05-30 PROCEDURE — 99214 OFFICE O/P EST MOD 30 MIN: CPT | Performed by: STUDENT IN AN ORGANIZED HEALTH CARE EDUCATION/TRAINING PROGRAM

## 2024-05-30 PROCEDURE — 93797 PHYS/QHP OP CAR RHAB WO ECG: CPT

## 2024-05-30 RX ORDER — SPIRONOLACTONE 25 MG/1
25 TABLET ORAL DAILY
Qty: 90 TABLET | Refills: 5 | Status: SHIPPED | OUTPATIENT
Start: 2024-05-30 | End: 2025-11-21

## 2024-05-30 RX ORDER — METOPROLOL SUCCINATE 25 MG/1
50 TABLET, EXTENDED RELEASE ORAL
Qty: 180 TABLET | Refills: 5 | Status: SHIPPED | OUTPATIENT
Start: 2024-05-30 | End: 2025-11-21

## 2024-05-30 NOTE — LETTER
May 30, 2024     Patient: Vesna Langston  YOB: 1960  Date of Visit: 5/30/2024      To Whom it May Concern:    Vesna Langston is under my professional care. Vesna was seen in my office on 5/30/2024. Vesna may return to work on 6/2/24 .    If you have any questions or concerns, please don't hesitate to call.         Sincerely,          Vitor Bell MD        CC: No Recipients

## 2024-06-01 PROBLEM — J06.9 VIRAL UPPER RESPIRATORY TRACT INFECTION: Status: RESOLVED | Noted: 2024-05-02 | Resolved: 2024-06-01

## 2024-06-03 ENCOUNTER — CLINICAL SUPPORT (OUTPATIENT)
Dept: CARDIAC REHAB | Facility: CLINIC | Age: 64
End: 2024-06-03
Payer: COMMERCIAL

## 2024-06-03 DIAGNOSIS — I50.22 CHRONIC SYSTOLIC HEART FAILURE (HCC): Primary | ICD-10-CM

## 2024-06-03 PROCEDURE — 93798 PHYS/QHP OP CAR RHAB W/ECG: CPT

## 2024-06-05 ENCOUNTER — CLINICAL SUPPORT (OUTPATIENT)
Dept: CARDIAC REHAB | Facility: CLINIC | Age: 64
End: 2024-06-05
Payer: COMMERCIAL

## 2024-06-05 DIAGNOSIS — I50.22 CHRONIC SYSTOLIC HEART FAILURE (HCC): Primary | ICD-10-CM

## 2024-06-05 PROCEDURE — 93798 PHYS/QHP OP CAR RHAB W/ECG: CPT

## 2024-06-06 ENCOUNTER — OFFICE VISIT (OUTPATIENT)
Dept: URGENT CARE | Facility: CLINIC | Age: 64
End: 2024-06-06
Payer: COMMERCIAL

## 2024-06-06 ENCOUNTER — TELEPHONE (OUTPATIENT)
Dept: CARDIOLOGY CLINIC | Facility: CLINIC | Age: 64
End: 2024-06-06

## 2024-06-06 VITALS
BODY MASS INDEX: 30.77 KG/M2 | WEIGHT: 203 LBS | OXYGEN SATURATION: 97 % | HEART RATE: 80 BPM | SYSTOLIC BLOOD PRESSURE: 114 MMHG | DIASTOLIC BLOOD PRESSURE: 58 MMHG | HEIGHT: 68 IN | TEMPERATURE: 97.3 F | RESPIRATION RATE: 18 BRPM

## 2024-06-06 DIAGNOSIS — N30.01 ACUTE CYSTITIS WITH HEMATURIA: Primary | ICD-10-CM

## 2024-06-06 LAB
SL AMB  POCT GLUCOSE, UA: ABNORMAL
SL AMB LEUKOCYTE ESTERASE,UA: ABNORMAL
SL AMB POCT BILIRUBIN,UA: NEGATIVE
SL AMB POCT BLOOD,UA: ABNORMAL
SL AMB POCT CLARITY,UA: CLEAR
SL AMB POCT COLOR,UA: YELLOW
SL AMB POCT KETONES,UA: NEGATIVE
SL AMB POCT NITRITE,UA: NEGATIVE
SL AMB POCT PH,UA: 5
SL AMB POCT SPECIFIC GRAVITY,UA: 1.02
SL AMB POCT URINE PROTEIN: NEGATIVE
SL AMB POCT UROBILINOGEN: NEGATIVE

## 2024-06-06 PROCEDURE — 81002 URINALYSIS NONAUTO W/O SCOPE: CPT

## 2024-06-06 PROCEDURE — S9083 URGENT CARE CENTER GLOBAL: HCPCS

## 2024-06-06 PROCEDURE — 87086 URINE CULTURE/COLONY COUNT: CPT

## 2024-06-06 PROCEDURE — G0382 LEV 3 HOSP TYPE B ED VISIT: HCPCS

## 2024-06-06 RX ORDER — CEPHALEXIN 500 MG/1
500 CAPSULE ORAL EVERY 12 HOURS SCHEDULED
Qty: 14 CAPSULE | Refills: 0 | Status: SHIPPED | OUTPATIENT
Start: 2024-06-06 | End: 2024-06-13

## 2024-06-06 NOTE — PROGRESS NOTES
[facility]        NAME: Vesna Langston is a 64 y.o. female  : 1960    MRN: [id number]  DATE: 2024  TIME: 11:04 AM    Assessment and Plan   Acute cystitis with hematuria [N30.01]  1. Acute cystitis with hematuria  POCT urine dip    Urine culture    Urine culture    cephalexin (KEFLEX) 500 mg capsule        Urine dip showing small leukocytes and trace blood. Urine culture pending. Will treat with Cephalexin and encouraged continued supportive measures.  Follow up with PCP in 3-5 days or proceed to emergency department for worsening symptoms.  Patient verbalized understanding of instructions given.     Patient Instructions     Patient Instructions   Urine culture pending  Take antibiotic as prescribed  Increase fluid intake   Follow up with PCP in 3-5 days.  Proceed to  ER if symptoms worsen.    If tests have been performed at [facility], our office will contact you with results if changes need to be made to the care plan discussed with you at the visit.  You can review your full results on [facility] MyChart.    Urinary Tract Infection in Women   AMBULATORY CARE:   A urinary tract infection (UTI)  is caused by bacteria that get inside your urinary tract. Your urinary tract includes your kidneys, ureters, bladder, and urethra. A UTI is more common in your lower urinary tract, which includes your bladder and urethra.       Common symptoms include the following:   Urinating more often or waking from sleep to urinate    Pain or burning when you urinate    Pain or pressure in your lower abdomen and back    Urine that smells bad    Blood in your urine    Leaking urine    Seek care immediately if:   You are urinating very little or not at all.    You have a high fever with shaking chills.    You have side or back pain that gets worse.    Call your doctor if:   You have a fever.    You do not feel better after 2 days of taking antibiotics.    You have new symptoms, such as blood or pus in your  urine.    You are vomiting.    You have questions or concerns about your condition or care.    Treatment for a UTI  may include antibiotics to treat a bacterial infection. You may also need medicines to decrease pain and burning, or decrease the urge to urinate often. If you have UTIs often (called recurrent UTIs), you may be given antibiotics to take regularly. You will be given directions for when and how to use antibiotics. The goal is to prevent UTIs but not cause antibiotic resistance by using antibiotics too often.  Prevent a UTI:   Empty your bladder often.  Urinate and empty your bladder as soon as you feel the need. Do not hold your urine for long periods of time.    Wipe from front to back after you urinate or have a bowel movement.  This will help prevent germs from getting into your urinary tract through your urethra.    Drink liquids as directed.  Ask how much liquid to drink each day and which liquids are best for you. You may need to drink more liquids than usual to help flush out the bacteria. Do not drink alcohol, caffeine, or citrus juices. These can irritate your bladder and increase your symptoms. Your healthcare provider may recommend cranberry juice to help prevent a UTI.    Urinate before and after you have sex.  This can help flush out bacteria passed during sex.    Do not douche or use feminine deodorants.  These can change the chemical balance in your vagina.    Change sanitary pads or tampons often.  This will help prevent germs from getting into your urinary tract.    Talk to your healthcare provider about your birth control method.  You may need to change your method if it is increasing your risk for UTIs.    Wear cotton underwear and clothes that are loose.  Tight pants and nylon underwear can trap moisture and cause bacteria to grow.    Vaginal estrogen may be recommended.  This medicine helps prevent UTIs in women who have gone through menopause or are in suzanna-menopause.    Do pelvic  muscle exercises often.  Pelvic muscle exercises may help you start and stop urinating. Strong pelvic muscles may help you empty your bladder easier. Squeeze these muscles tightly for 5 seconds like you are trying to hold back urine. Then relax for 5 seconds. Gradually work up to squeezing for 10 seconds. Do 3 sets of 15 repetitions a day, or as directed.    Follow up with your doctor as directed:  Write down your questions so you remember to ask them during your visits.  © Copyright [employer] 2023 Information is for End User's use only and may not be sold, redistributed or otherwise used for commercial purposes.  The above information is an  only. It is not intended as medical advice for individual conditions or treatments. Talk to your doctor, nurse or pharmacist before following any medical regimen to see if it is safe and effective for you.        Chief Complaint     Chief Complaint   Patient presents with    Possible UTI     C/o burning upon urination associated with urgency. Onset 2 days ago. Reports started Jardiance 7 days ago.         History of Present Illness       64-year-old female with a past medical history significant for MI, CHF, and type II DM presents with complaints of dysuria and urinary frequency with urgency x 3 days.  Denies fever, chills, back pain, abdominal pain, nausea, vomiting, or diarrhea.  Distant history of UTI.  No vaginal complaints.  Patient reports starting Jardiance approximately 1 week ago and advised to monitor for UTI symptoms.    Difficulty Urinating   This is a new problem. The current episode started yesterday. The problem occurs every urination. The problem has been gradually worsening. The quality of the pain is described as burning. The pain is at a severity of 3/10. The pain is mild. There has been no fever. She is Not sexually active. There is No history of pyelonephritis. Associated symptoms include frequency and urgency. Pertinent negatives include no  chills, discharge, flank pain, hematuria, hesitancy, nausea, possible pregnancy, sweats or vomiting.       Review of Systems   Review of Systems   Constitutional:  Negative for chills and fever.   HENT:  Negative for congestion, ear discharge, ear pain, rhinorrhea and sore throat.    Eyes:  Negative for discharge.   Respiratory:  Negative for cough, shortness of breath and wheezing.    Cardiovascular:  Negative for chest pain.   Gastrointestinal:  Negative for abdominal pain, diarrhea, nausea and vomiting.   Genitourinary:  Positive for difficulty urinating, dysuria, frequency and urgency. Negative for flank pain, hematuria and hesitancy.   Musculoskeletal:  Negative for back pain.   Skin:  Negative for rash.         Current Medications       Current Outpatient Medications:     albuterol (ProAir HFA) 90 mcg/act inhaler, Inhale 2 puffs every 6 (six) hours as needed for wheezing, Disp: 8.5 g, Rfl: 0    amiodarone 200 mg tablet, Take 1 tablet (200 mg total) by mouth daily, Disp: , Rfl:     apixaban (ELIQUIS) 5 mg, Take 1 tablet (5 mg total) by mouth 2 (two) times a day, Disp: 60 tablet, Rfl: 2    atorvastatin (LIPITOR) 80 mg tablet, Take 1 tablet (80 mg total) by mouth every evening, Disp: 30 tablet, Rfl: 2    cephalexin (KEFLEX) 500 mg capsule, Take 1 capsule (500 mg total) by mouth every 12 (twelve) hours for 7 days, Disp: 14 capsule, Rfl: 0    clopidogrel (PLAVIX) 75 mg tablet, Take 1 tablet (75 mg total) by mouth daily, Disp: 30 tablet, Rfl: 2    Empagliflozin (Jardiance) 25 MG TABS, Take 1 tablet (25 mg total) by mouth every morning, Disp: 90 tablet, Rfl: 5    escitalopram (LEXAPRO) 10 mg tablet, Take 1 tablet (10 mg total) by mouth daily, Disp: 30 tablet, Rfl: 5    furosemide (LASIX) 20 mg tablet, Take 1 tablet (20 mg total) by mouth as needed (for rapid weight gain (3+ lbs overnight or 5+ lbs in 5-7 days) or worsening shortness of breath), Disp: 30 tablet, Rfl: 1    gabapentin (Neurontin) 100 mg capsule, Take 1  capsule (100 mg total) by mouth 2 (two) times a day, Disp: , Rfl:     HYDROcodone-acetaminophen (NORCO) 5-325 mg per tablet, Take 1 tablet by mouth every 6 (six) hours as needed for pain for up to 10 doses Max Daily Amount: 4 tablets, Disp: 10 tablet, Rfl: 0    levothyroxine (Synthroid) 50 mcg tablet, Take 1 tablet (50 mcg total) by mouth daily, Disp: 30 tablet, Rfl: 5    meclizine (ANTIVERT) 25 mg tablet, Take 1 tablet (25 mg total) by mouth every 8 (eight) hours as needed for dizziness, Disp: 30 tablet, Rfl: 0    metoprolol succinate (TOPROL-XL) 25 mg 24 hr tablet, Take 2 tablets (50 mg total) by mouth daily at bedtime, Disp: 180 tablet, Rfl: 5    Multiple Vitamin (MULTIVITAMIN) capsule, Take 1 capsule by mouth daily, Disp: , Rfl:     pantoprazole (PROTONIX) 40 mg tablet, Take 1 tablet (40 mg total) by mouth 2 (two) times a day before meals, Disp: 60 tablet, Rfl: 3    sacubitril-valsartan (Entresto) 24-26 MG TABS, Take 1 tablet by mouth 2 (two) times a day, Disp: 180 tablet, Rfl: 5    spironolactone (ALDACTONE) 25 mg tablet, Take 1 tablet (25 mg total) by mouth daily, Disp: 90 tablet, Rfl: 5    sucralfate (CARAFATE) 1 g tablet, Take 1 tablet (1 g total) by mouth 4 (four) times a day (before meals and at bedtime), Disp: 120 tablet, Rfl: 3    Current Allergies     Allergies as of 06/06/2024 - Reviewed 06/06/2024   Allergen Reaction Noted    Shellfish-derived products - food allergy Anaphylaxis 06/10/2019    Azithromycin Rash 10/10/2012            The following portions of the patient's history were reviewed and updated as appropriate: allergies, current medications, past family history, past medical history, past social history, past surgical history and problem list.     Past Medical History:   Diagnosis Date    Acute respiratory insufficiency 03/22/2024    Allergic     Chronic HFrEF (heart failure with reduced ejection fraction) (HCC)     Coronary artery disease     COVID-19 virus infection 11/28/2022    Diabetes  "mellitus (HCC)     Disease of thyroid gland 2024    Hyperlipidemia     Hypoglycemia     ICD (implantable cardioverter-defibrillator) in place     Ischemic cardiomyopathy     Lactic acidosis 2024    Myocardial infarction (HCC) 3/22/2024    Otitis media 1966    ST elevation myocardial infarction involving left anterior descending (LAD) coronary artery (HCC) 2024    Tobacco abuse     Visual impairment 1967       Past Surgical History:   Procedure Laterality Date    ADENOIDECTOMY      APPENDECTOMY      APPENDECTOMY LAPAROSCOPIC N/A 2024    Procedure: APPENDECTOMY LAPAROSCOPIC;  Surgeon: Lauryn Ullrich, DO;  Location: [facility];  Service: General    BREAST EXCISIONAL BIOPSY Right 2000    cyst removed- benign    CARDIAC CATHETERIZATION N/A 2024    Procedure: Cardiac pci;  Surgeon: Gabriel Myers MD;  [facility]: [facility];  Service: Cardiology    CARDIAC CATHETERIZATION N/A 2024    Procedure: Cardiac Coronary Angiogram;  Surgeon: Gabriel Myers MD;  [facility];  Service: Cardiology    CARDIAC CATHETERIZATION N/A 2024    Procedure: Cardiac PCI AMI;  Surgeon: Gabriel Myers MD;  [facility];  Service: Cardiology    CARDIAC CATHETERIZATION N/A 2024    Procedure: Cardiac IVUS;  Surgeon: Gabriel Myers MD;  [facility];  Service: Cardiology    CARDIAC ELECTROPHYSIOLOGY PROCEDURE N/A 2024    Procedure: Cardiac icd implant;  Surgeon: Jeffy Lama MD;  [facility];  Service: Cardiology     SECTION      ECTOPIC PREGNANCY SURGERY      SEPTOPLASTY      SPINE SURGERY  23    TONSILLECTOMY         Family History   Adopted: Yes   Problem Relation Age of Onset    Depression Mother     Heart disease Father     OCD Daughter          Medications have been verified.        Objective   /58   Pulse 80   Temp (!) 97.3 °F (36.3 °C)   Resp 18   Ht 5' 8\" (1.727 m)   Wt 92.1 kg (203 lb)   SpO2 " 97%   BMI 30.87 kg/m²   No LMP recorded. Patient is postmenopausal.       Physical Exam     Physical Exam  Vitals and nursing note reviewed.   Constitutional:       General: She is not in acute distress.     Appearance: She is not toxic-appearing.   HENT:      Head: Normocephalic.   Eyes:      Conjunctiva/sclera: Conjunctivae normal.   Cardiovascular:      Rate and Rhythm: Normal rate and regular rhythm.      Heart sounds: Normal heart sounds.   Pulmonary:      Effort: Pulmonary effort is normal. No respiratory distress.      Breath sounds: Normal breath sounds. No stridor. No wheezing, rhonchi or rales.   Abdominal:      General: Bowel sounds are normal.      Palpations: Abdomen is soft.      Tenderness: There is abdominal tenderness in the suprapubic area. There is no right CVA tenderness or left CVA tenderness.   Skin:     General: Skin is warm and dry.   Neurological:      Mental Status: She is alert and oriented to person, place, and time.      Gait: Gait is intact.   Psychiatric:         Mood and Affect: Mood normal.         Behavior: Behavior normal.

## 2024-06-06 NOTE — PATIENT INSTRUCTIONS
Urine culture pending  Take antibiotic as prescribed  Increase fluid intake   Follow up with PCP in 3-5 days.  Proceed to  ER if symptoms worsen.    If tests have been performed at [facility], our office will contact you with results if changes need to be made to the care plan discussed with you at the visit.  You can review your full results on [facility] MyChart.    Urinary Tract Infection in Women   AMBULATORY CARE:   A urinary tract infection (UTI)  is caused by bacteria that get inside your urinary tract. Your urinary tract includes your kidneys, ureters, bladder, and urethra. A UTI is more common in your lower urinary tract, which includes your bladder and urethra.       Common symptoms include the following:   Urinating more often or waking from sleep to urinate    Pain or burning when you urinate    Pain or pressure in your lower abdomen and back    Urine that smells bad    Blood in your urine    Leaking urine    Seek care immediately if:   You are urinating very little or not at all.    You have a high fever with shaking chills.    You have side or back pain that gets worse.    Call your doctor if:   You have a fever.    You do not feel better after 2 days of taking antibiotics.    You have new symptoms, such as blood or pus in your urine.    You are vomiting.    You have questions or concerns about your condition or care.    Treatment for a UTI  may include antibiotics to treat a bacterial infection. You may also need medicines to decrease pain and burning, or decrease the urge to urinate often. If you have UTIs often (called recurrent UTIs), you may be given antibiotics to take regularly. You will be given directions for when and how to use antibiotics. The goal is to prevent UTIs but not cause antibiotic resistance by using antibiotics too often.  Prevent a UTI:   Empty your bladder often.  Urinate and empty your bladder as soon as you feel the need. Do not hold your urine for long periods of time.    Wipe  from front to back after you urinate or have a bowel movement.  This will help prevent germs from getting into your urinary tract through your urethra.    Drink liquids as directed.  Ask how much liquid to drink each day and which liquids are best for you. You may need to drink more liquids than usual to help flush out the bacteria. Do not drink alcohol, caffeine, or citrus juices. These can irritate your bladder and increase your symptoms. Your healthcare provider may recommend cranberry juice to help prevent a UTI.    Urinate before and after you have sex.  This can help flush out bacteria passed during sex.    Do not douche or use feminine deodorants.  These can change the chemical balance in your vagina.    Change sanitary pads or tampons often.  This will help prevent germs from getting into your urinary tract.    Talk to your healthcare provider about your birth control method.  You may need to change your method if it is increasing your risk for UTIs.    Wear cotton underwear and clothes that are loose.  Tight pants and nylon underwear can trap moisture and cause bacteria to grow.    Vaginal estrogen may be recommended.  This medicine helps prevent UTIs in women who have gone through menopause or are in suzanna-menopause.    Do pelvic muscle exercises often.  Pelvic muscle exercises may help you start and stop urinating. Strong pelvic muscles may help you empty your bladder easier. Squeeze these muscles tightly for 5 seconds like you are trying to hold back urine. Then relax for 5 seconds. Gradually work up to squeezing for 10 seconds. Do 3 sets of 15 repetitions a day, or as directed.    Follow up with your doctor as directed:  Write down your questions so you remember to ask them during your visits.  © Copyright [employer] 2023 Information is for End User's use only and may not be sold, redistributed or otherwise used for commercial purposes.  The above information is an  only. It is not intended  as medical advice for individual conditions or treatments. Talk to your doctor, nurse or pharmacist before following any medical regimen to see if it is safe and effective for you.

## 2024-06-06 NOTE — TELEPHONE ENCOUNTER
Returned call to patient & read her Dr Shrestha's orders.    Patient verbalized understanding & will stop taking Jardiance for 2 weeks..    Patient stated on Monday. 6/10, she is asking that  Dr Bell be notified of her UTI & the Jardiance hold.

## 2024-06-06 NOTE — TELEPHONE ENCOUNTER
Patient of Dr Bell who is out of the office.    Patient called to state that  today she was diagnosed with a UTI with hematuria & started on Cephalexin 500 mg every 12 hours for 7 days, which she will start once she picks it up from the pharmacy.    Patient stated she started Jardiance last Thursday, 5/30, & Dr Bell informed her a potential side effect of Jardiance can be UTI's.    Patient is asking what the next step is. Does she continue taking Jardiance?    Please advise.

## 2024-06-07 ENCOUNTER — CLINICAL SUPPORT (OUTPATIENT)
Dept: CARDIAC REHAB | Facility: CLINIC | Age: 64
End: 2024-06-07
Payer: COMMERCIAL

## 2024-06-07 DIAGNOSIS — I50.22 CHRONIC SYSTOLIC HEART FAILURE (HCC): Primary | ICD-10-CM

## 2024-06-07 PROCEDURE — 93798 PHYS/QHP OP CAR RHAB W/ECG: CPT

## 2024-06-08 LAB — BACTERIA UR CULT: NORMAL

## 2024-06-10 ENCOUNTER — CLINICAL SUPPORT (OUTPATIENT)
Dept: CARDIAC REHAB | Facility: CLINIC | Age: 64
End: 2024-06-10
Payer: COMMERCIAL

## 2024-06-10 DIAGNOSIS — I50.22 CHRONIC SYSTOLIC HEART FAILURE (HCC): Primary | ICD-10-CM

## 2024-06-10 PROCEDURE — 93798 PHYS/QHP OP CAR RHAB W/ECG: CPT

## 2024-06-12 ENCOUNTER — CLINICAL SUPPORT (OUTPATIENT)
Dept: CARDIAC REHAB | Facility: CLINIC | Age: 64
End: 2024-06-12
Payer: COMMERCIAL

## 2024-06-12 DIAGNOSIS — I50.22 CHRONIC SYSTOLIC HEART FAILURE (HCC): Primary | ICD-10-CM

## 2024-06-12 PROCEDURE — 93798 PHYS/QHP OP CAR RHAB W/ECG: CPT

## 2024-06-13 ENCOUNTER — LAB (OUTPATIENT)
Dept: LAB | Facility: HOSPITAL | Age: 64
End: 2024-06-13
Payer: COMMERCIAL

## 2024-06-13 DIAGNOSIS — I50.20 HFREF (HEART FAILURE WITH REDUCED EJECTION FRACTION) (HCC): ICD-10-CM

## 2024-06-13 LAB
ANION GAP SERPL CALCULATED.3IONS-SCNC: 7 MMOL/L (ref 4–13)
BUN SERPL-MCNC: 10 MG/DL (ref 5–25)
CALCIUM SERPL-MCNC: 9.4 MG/DL (ref 8.4–10.2)
CHLORIDE SERPL-SCNC: 106 MMOL/L (ref 96–108)
CO2 SERPL-SCNC: 26 MMOL/L (ref 21–32)
CREAT SERPL-MCNC: 0.75 MG/DL (ref 0.6–1.3)
GFR SERPL CREATININE-BSD FRML MDRD: 83 ML/MIN/1.73SQ M
GLUCOSE P FAST SERPL-MCNC: 122 MG/DL (ref 65–99)
POTASSIUM SERPL-SCNC: 3.8 MMOL/L (ref 3.5–5.3)
SODIUM SERPL-SCNC: 139 MMOL/L (ref 135–147)

## 2024-06-13 PROCEDURE — 36415 COLL VENOUS BLD VENIPUNCTURE: CPT

## 2024-06-13 PROCEDURE — 80048 BASIC METABOLIC PNL TOTAL CA: CPT

## 2024-06-14 ENCOUNTER — CLINICAL SUPPORT (OUTPATIENT)
Dept: CARDIAC REHAB | Facility: CLINIC | Age: 64
End: 2024-06-14
Payer: COMMERCIAL

## 2024-06-14 ENCOUNTER — TELEPHONE (OUTPATIENT)
Age: 64
End: 2024-06-14

## 2024-06-14 DIAGNOSIS — I50.22 CHRONIC SYSTOLIC HEART FAILURE (HCC): Primary | ICD-10-CM

## 2024-06-14 PROCEDURE — 93798 PHYS/QHP OP CAR RHAB W/ECG: CPT

## 2024-06-17 ENCOUNTER — CLINICAL SUPPORT (OUTPATIENT)
Dept: CARDIAC REHAB | Facility: CLINIC | Age: 64
End: 2024-06-17
Payer: COMMERCIAL

## 2024-06-17 DIAGNOSIS — I50.22 CHRONIC SYSTOLIC HEART FAILURE (HCC): Primary | ICD-10-CM

## 2024-06-17 PROCEDURE — 93798 PHYS/QHP OP CAR RHAB W/ECG: CPT

## 2024-06-18 DIAGNOSIS — R55 SYNCOPE AND COLLAPSE: ICD-10-CM

## 2024-06-18 NOTE — TELEPHONE ENCOUNTER
I need a refill for Amiodarone  mg tablets It shows no refills remaining and won’t let me order from the refill page. Thank you   Medication: amiodarone 200mg tablet    Dose/Frequency: 1 tablet daily    Quantity: 30 tablets    Pharmacy: [facility]    Office:   [] PCP/Provider -   [] Speciality/Provider -     Does the patient have enough for 3 days?   [] Yes   [] No - Send as HP to POD

## 2024-06-19 ENCOUNTER — CLINICAL SUPPORT (OUTPATIENT)
Dept: CARDIAC REHAB | Facility: CLINIC | Age: 64
End: 2024-06-19
Payer: COMMERCIAL

## 2024-06-19 DIAGNOSIS — I50.22 CHRONIC SYSTOLIC HEART FAILURE (HCC): Primary | ICD-10-CM

## 2024-06-19 PROCEDURE — 93798 PHYS/QHP OP CAR RHAB W/ECG: CPT

## 2024-06-19 RX ORDER — AMIODARONE HYDROCHLORIDE 200 MG/1
200 TABLET ORAL DAILY
Qty: 30 TABLET | Refills: 2 | Status: SHIPPED | OUTPATIENT
Start: 2024-06-19 | End: 2024-09-17

## 2024-06-20 DIAGNOSIS — E78.5 HYPERLIPIDEMIA, UNSPECIFIED HYPERLIPIDEMIA TYPE: ICD-10-CM

## 2024-06-20 DIAGNOSIS — I21.02 ST ELEVATION MYOCARDIAL INFARCTION INVOLVING LEFT ANTERIOR DESCENDING (LAD) CORONARY ARTERY (HCC): ICD-10-CM

## 2024-06-20 DIAGNOSIS — I48.92 ATRIAL FLUTTER, UNSPECIFIED TYPE (HCC): ICD-10-CM

## 2024-06-20 DIAGNOSIS — I21.3 STEMI (ST ELEVATION MYOCARDIAL INFARCTION) (HCC): ICD-10-CM

## 2024-06-20 DIAGNOSIS — I50.22 CHRONIC HFREF (HEART FAILURE WITH REDUCED EJECTION FRACTION) (HCC): ICD-10-CM

## 2024-06-21 ENCOUNTER — CLINICAL SUPPORT (OUTPATIENT)
Dept: CARDIAC REHAB | Facility: CLINIC | Age: 64
End: 2024-06-21
Payer: COMMERCIAL

## 2024-06-21 DIAGNOSIS — I50.22 CHRONIC SYSTOLIC HEART FAILURE (HCC): Primary | ICD-10-CM

## 2024-06-21 PROCEDURE — 93798 PHYS/QHP OP CAR RHAB W/ECG: CPT

## 2024-06-21 RX ORDER — FUROSEMIDE 20 MG/1
20 TABLET ORAL AS NEEDED
Qty: 30 TABLET | Refills: 0 | Status: SHIPPED | OUTPATIENT
Start: 2024-06-21

## 2024-06-21 RX ORDER — CLOPIDOGREL BISULFATE 75 MG/1
75 TABLET ORAL DAILY
Qty: 30 TABLET | Refills: 0 | Status: SHIPPED | OUTPATIENT
Start: 2024-06-21 | End: 2024-09-19

## 2024-06-21 RX ORDER — ATORVASTATIN CALCIUM 80 MG/1
80 TABLET, FILM COATED ORAL EVERY EVENING
Qty: 30 TABLET | Refills: 0 | Status: SHIPPED | OUTPATIENT
Start: 2024-06-21 | End: 2024-09-19

## 2024-06-24 ENCOUNTER — APPOINTMENT (OUTPATIENT)
Dept: CARDIAC REHAB | Facility: CLINIC | Age: 64
End: 2024-06-24
Payer: COMMERCIAL

## 2024-06-25 ENCOUNTER — LAB (OUTPATIENT)
Dept: LAB | Facility: HOSPITAL | Age: 64
End: 2024-06-25
Payer: COMMERCIAL

## 2024-06-25 DIAGNOSIS — G89.29 CHRONIC PAIN OF RIGHT KNEE: Primary | ICD-10-CM

## 2024-06-25 DIAGNOSIS — E11.9 TYPE 2 DIABETES MELLITUS, WITHOUT LONG-TERM CURRENT USE OF INSULIN (HCC): ICD-10-CM

## 2024-06-25 DIAGNOSIS — M25.561 CHRONIC PAIN OF RIGHT KNEE: Primary | ICD-10-CM

## 2024-06-25 LAB
EST. AVERAGE GLUCOSE BLD GHB EST-MCNC: 146 MG/DL
HBA1C MFR BLD: 6.7 %

## 2024-06-25 PROCEDURE — 83036 HEMOGLOBIN GLYCOSYLATED A1C: CPT

## 2024-06-25 PROCEDURE — 36415 COLL VENOUS BLD VENIPUNCTURE: CPT

## 2024-06-26 ENCOUNTER — CLINICAL SUPPORT (OUTPATIENT)
Dept: CARDIAC REHAB | Facility: CLINIC | Age: 64
End: 2024-06-26
Payer: COMMERCIAL

## 2024-06-26 DIAGNOSIS — I50.22 CHRONIC SYSTOLIC HEART FAILURE (HCC): Primary | ICD-10-CM

## 2024-06-26 PROCEDURE — 93798 PHYS/QHP OP CAR RHAB W/ECG: CPT

## 2024-06-26 NOTE — PROGRESS NOTES
CARDIAC REHABILITATION ASSESSMENT AND INDIVIDUALIZED TREATMENT PLAN  30 DAY      Today's date: 2024   # of Exercise Sessions Completed: 11  Patient name: Vesna Langston      : 1960  Age: 64 y.o.       MRN: [id number]  Referring Physician: Vitor Bell MD  Cardiologist: Vitor Bell MD  Provider: [place]  Clinician: Velia Villalta MS, [facility] [place]      Comments:   Vesna is compliant with exercise 3 days/week in cardiac rehab and 2 days/week PT exercise due to her back pain/injury. In cardiac rehab, Vesna tolerates 40-52 min of exercise at 2.5-3.5 METs on the NuStep, Recumbent cycle, arm bike and treadmill walking plus light weight training with very gradual weight increases. I spoke with Vesna's physical therapist last week to discuss limitations any her plan at PT to optimize her workouts in cardiac rehab. She currently has a 10 lb lifting restriction with loose enforcement and has been asked to avoid twisting and bending of her back. Resting HR 56-82 bpm with increased HR response to exercise reaching  bpm. NSR noted on telemetry. Resting BP 92/64 - 112/60 with increased BP response to exercise reaching 112/66 - 130/62. Recovery /60 - 108/62. Vesna has been very actively reading her food labels, paying attention to sodium, added sugar and saturated fat content. She has maintained her weight at 208-209 lb. Her depression symptoms have improved and she is enjoying cardiac rehab so far.     24: Today is Vesna's initial evaluation to begin Cardiac Rehab now 10 wks post syncope x3, Aflutter, VT requiring defibrillation, STEMI/stent to 100%  of ostLAD, and ICD placement. Threes days post hospital discharge, Vesna returned to the hospital due to syncope during a bowel movement attempt. She does report SOB since having Covid 3/24 and no SOB since event. Vesna has a history of HLD, sciatica pain, chronic back pain (SI joint injection 3/22/24), DM II (110-125), Smoking (quit 3/22/24), Anxiety and  recent appendicitis (s/p appendectomy 5/8/24). She does currently follow a formal exercise program at home, including walking 1 mile each day with her cane. At PT, she reports walking on the treadmill 10-20 min with supervision. She is also working on core strength and stretching at PT (due to loose screws in her back). Her weight limit is at 10 lb right now.  She has resumed most ADLs, limited by back injury. Today, Vesna completed an initial 6MWT with cane. We also discussed current dietary habits and goals of heart healthy eating. She has been reading more food labels, reducing sodium, and increasing fresh fruits and vegetables. Depression and anxiety were assessed with PHQ-9 and BECCA-7 questionnaires with scores of 8 and 7 respectively, suggesting  5-9 = Mild Depression and  5-9 = Mild anxiety. When addressed, Vesna admits to having depression symptoms since hospital discharge and reports good social/emotional support.      Dx:   Encounter Diagnosis   Name Primary?    Chronic systolic heart failure (HCC) Yes       Description of Diagnosis: S/p MI in 03/2024; received PCI to 100% stenosis of ostial/proximal LAD.     Date of onset: 3/22/24    Other Cardiac History: HFrEF, LVEF 30%; Atrial flutter; monomorphic ventricular tachycardia - S/p secondary prevention ICD (3/27/24)        ASSESSMENT    Medical History:   Past Medical History:   Diagnosis Date    Acute respiratory insufficiency 03/22/2024    Allergic     Chronic HFrEF (heart failure with reduced ejection fraction) ([facility])     Coronary artery disease     COVID-19 virus infection 11/28/2022    Diabetes mellitus (HCC)     Disease of thyroid gland 4/09/2024    Hyperlipidemia     Hypoglycemia     ICD (implantable cardioverter-defibrillator) in place     Ischemic cardiomyopathy     Lactic acidosis 03/22/2024    Myocardial infarction (HCC) 3/22/2024    Otitis media 1966    ST elevation myocardial infarction involving left anterior descending (LAD) coronary artery  ([facility]) 03/22/2024    Tobacco abuse     Visual impairment 1967       Family History:  Family History   Adopted: Yes   Problem Relation Age of Onset    Depression Mother     Heart disease Father     OCD Daughter        Allergies:   Shellfish-derived products - food allergy and Azithromycin    Current Medications:   Current Outpatient Medications   Medication Sig Dispense Refill    albuterol (ProAir HFA) 90 mcg/act inhaler Inhale 2 puffs every 6 (six) hours as needed for wheezing 8.5 g 0    amiodarone 200 mg tablet Take 1 tablet (200 mg total) by mouth daily 30 tablet 2    apixaban (ELIQUIS) 5 mg Take 1 tablet (5 mg total) by mouth 2 (two) times a day 60 tablet 1    atorvastatin (LIPITOR) 80 mg tablet Take 1 tablet (80 mg total) by mouth every evening 30 tablet 0    clopidogrel (PLAVIX) 75 mg tablet Take 1 tablet (75 mg total) by mouth daily 30 tablet 0    Empagliflozin (Jardiance) 25 MG TABS Take 1 tablet (25 mg total) by mouth every morning 90 tablet 5    escitalopram (LEXAPRO) 10 mg tablet Take 1 tablet (10 mg total) by mouth daily 30 tablet 5    furosemide (LASIX) 20 mg tablet Take 1 tablet (20 mg total) by mouth as needed (for rapid weight gain (3+ lbs overnight or 5+ lbs in 5-7 days) or worsening shortness of breath) 30 tablet 0    gabapentin (Neurontin) 100 mg capsule Take 1 capsule (100 mg total) by mouth 2 (two) times a day      HYDROcodone-acetaminophen (NORCO) 5-325 mg per tablet Take 1 tablet by mouth every 6 (six) hours as needed for pain for up to 10 doses Max Daily Amount: 4 tablets 10 tablet 0    levothyroxine (Synthroid) 50 mcg tablet Take 1 tablet (50 mcg total) by mouth daily 30 tablet 5    meclizine (ANTIVERT) 25 mg tablet Take 1 tablet (25 mg total) by mouth every 8 (eight) hours as needed for dizziness 30 tablet 0    metoprolol succinate (TOPROL-XL) 25 mg 24 hr tablet Take 2 tablets (50 mg total) by mouth daily at bedtime 180 tablet 5    Multiple Vitamin (MULTIVITAMIN) capsule Take 1 capsule by  mouth daily      pantoprazole (PROTONIX) 40 mg tablet Take 1 tablet (40 mg total) by mouth 2 (two) times a day before meals 60 tablet 3    sacubitril-valsartan (Entresto) 24-26 MG TABS Take 1 tablet by mouth 2 (two) times a day 180 tablet 5    spironolactone (ALDACTONE) 25 mg tablet Take 1 tablet (25 mg total) by mouth daily 90 tablet 5    sucralfate (CARAFATE) 1 g tablet Take 1 tablet (1 g total) by mouth 4 (four) times a day (before meals and at bedtime) 120 tablet 3     No current facility-administered medications for this visit.       Medication compliance: Yes   Comments: Pt reports to be compliant with medications    Physical Limitations: History of back surgery with complications, PT for 2 yrs now. Recent appendectomy, 10 lb weight restriction    Fall Risk: High   Comments: Reports a fall in the past 6 months (syncope x 4)    Cultural needs: None      CAD Risk Factors:  Cholesterol: Yes  HTN: No  DM: Type 2   Obesity: Yes   Inactivity: Yes      EXERCISE ASSESSMENT:     Initial Fitness Assessment: 6MWT:  Resting:    Resting:  BP: 120/72  HR: 60 bpm, Exercise:  BP: 146/74  HR: 108 bpm, METs:  2.6 (1,075 ft), ECG Summary: NSR, intermittent atrial pacing, Symptoms: very slightly labored breathing with faster walking, recovered quickly, and Comments: walked holding cane due to recommendation from orthopedic surgeon      Functional Status Prior to Diagnosis for Treatment:   Occupation: part time job  at [employer]  Recreation/Physical Activity: gardening, cooking  ADL’s: limited by Orthopedic limitations   [place]: able to perform self-care  Home exercise equipment:  None  Home exercise: walking a lot at work    Current Functional Status:  Occupation: part time work -  4 hours/day - [employer]  Recreation/Physical Activity: lawn mowing, weedwacker and leaf blowing (5-7 lb); watching grandchildren; cross-stitch; craft wreaths  ADL’s:Capable of performing light ADLs only limited by Orthopedic  limitations   [place]: able to perform self-care  Home exercise: walking 1 mile most days of the week    Functional Capacity Screening Tool:  [facility] Activity Status Index:  5.07 METs      PSYCHOSOCIAL ASSESSMENT:    Depression screening:  PHQ-9 = 8    Interpretation:  5-9 = Mild Depression  Anxiety screening:  BECCA-7 = 7    Interpretation: 5-9 = Mild anxiety    Pt self-report of depression and anxiety   Patient reports feelings of depression   Reports sufficient emotional support and is compliant with medical therapy (potentially short term)      Self-reported stress level:  5      Quality of Life Screen:  (Higher score indicates disease impact on QOL)  [facility] COOP score: 24/40        Social Support:   children, grandchildren, neighbors, and friends     Psychosocial Assessment as it relates to rehabilitation:   Patient denies issues with his/her family or home life that may affect their rehabilitation efforts.       NUTRITION ASSESSMENT:    Rate Your Plate Score: 73/81    Diabetes: T2D, Patient monitors BS 1 times/day, Patient reported fasting -125  A1c: 7.9%    last measured: 3/22/24    Lipid management: Last lipid profile 4/22/24  Chol 142    HDL 45  LDL 70    Current Dietary Habits:  Reducing sodium, increased fresh fruit and vegetables      OTHER CORE COMPONENT ASSESSMENT:    Tobacco Use:     Pt quit 3/22/24   and has abstained - was smoking 0.5 -1.0 ppd and had cut back and taking Chantix prior to MI    Anginal Symptoms:  SOB   NTG use: No prescription        INDIVIDUALIZED TREATMENT PLAN    See outlined plan of care below for specific patient goals in each component of care.        EXERCISE GOAL and PLAN    SMART Goals:   improved DASI score by 10%  increased exercise capacity by 40% based on peak METs tolerated in cardiac rehab exercise session  maintain > 150 minutes per week of moderate intensity exercise  improved 6MWT distance by 10%    Patient Specific EXERCISE GOALS:       attend rehab  regularly, start a home exercise program, and improve mobility and exercise tolerance, join [employer]    Progress toward SMART and personal Exercise goals:  pt walking 1 mile each day and going to PT 2 days/week; is back to work part time, compliant with cardiac rehab exercise    Plan for next 30 days:    education on home exercise guidelines and home exercise 30+ mins 2 days opposite CR    The patient was counseled on exercise guidelines to achieve a minimum of 150 mins/wk of moderate intensity (RPE 4-6)   exercise and encouraged to add 1-2 days of exercise on opposite days of cardiac rehab as tolerated.     Current Aerobic Exercise Prescription:      Frequency: 3 days/week   Supplement with home exercise 2+ days/wk as tolerated       Minutes: 40-52 min         METS: 2.5-3.5           HR:  bpm   RPE: 4-6         Modalities: Treadmill, UBE, NuStep, and Recumbent bike     Exercise workloads will be progressed gradually as tolerated, within limits of patient's ability, and according to the patient's   response to the exercise program.      Aerobic Exercise Prescription - 30 day goal:   Frequency: 3 days/week of cardiac rehab       Supplement with home exercise 2+ days/wk as tolerated    Minutes: 40       >150 mins/wk of moderate intensity exercise   METS: 2-3.5   HR:  RHR +30-40bpm ( bpm)/107-126 bpm (50-70% HRR)     RPE: 4-6   Modalities: Treadmill, UBE, Lifecycle, NuStep, Recumbent bike, and Room walking    Strength trainin-3 days / week  12-15 repetitions  1-2 sets per modality   With 10 lb restriction and in cooperation with PT   Modalities: Leg Press, Chest Press, Lateral Raise, Arm Extension, Arm Curl, Front Raises, Shoulder Shrugs, and Calf Raises    Home Exercise: Type: walking, Frequency: 5-7 days/week, Distance 1 mile    Group and Individual Education: benefit of exercise for CAD risk factors, AHA guidelines to achieve >150 mins/wk of moderate exercise, RPE scale, and Group class: Risk  Factors for Heart Disease     Readiness to change: Action:  (Changing behavior)      NUTRITION GOALS AND PLAN    Weight control:    Starting weight: 209.8 lb   Current weight: 208 lb    SMART Goals:   reduced BMI to < 25, reduced waist circumference <35 inches (F), fasting BG , and Improved Rate Your Plate score  >64    Patient Specific NUTRITION GOALS:     30 lb weight loss    Patient's progress toward SMART and personal Nutrition goals:   Reading food labels, received education on healthy eating and food labels    Plan for next 30 days:   increase daily intake of fruits and vegetables and rarely/never eat salty snacks (no seafood - allergic)    Group and Individual Education:   heart healthy eating principles  low sodium diet  nutrition for  lipid management  exercise and diabetes management   group class: Heart Healthy Eating  group class:  Label Reading    Readiness to change: Action:  (Changing behavior)      PSYCHOSOCIAL ASSESSMENT AND PLAN    Psychosocial Assessment as it relates to rehabilitation:   Patient denies issues with his/her family or home life that may affect their rehabilitation efforts.     SMART Goals:     Reduce perceived stress to 1-3/10 and PHQ-9 - reduced severity by one level    Patient Specific PSYCHOCOSOCIAL GOALS:     maintain compliance with medical therapy, spend time with family, and vacation with family end of July    Patient's progress toward SMART and personal Psychosocial goals:   Received education on stress management, reports improvement in depression symptoms    Plan for next 30 days:   Exercise, Spend time outdoors, Keep a positive mindset, Enjoy family, and Return to previous social activity    Group and Individual Education: signs/sxs of depression, benefits of a positive support system, depression and CAD, and class:  Stress and Your Health     Readiness to change: Action:  (Changing behavior)      OTHER CORE COMPONENTS GOALS and PLAN    Blood Pressure will be  monitored throughout the program and cardiologist will be notified of elevated trends.      Tobacco Intervention:   Pt quit 3/22/24 and has abstained since quitting.      SMART Goals:   medication compliance    Patient Specific CORE COMPONENT GOALS:    Improved EF%    Progress toward SMART and personal Core Component goals:    Continues to abstain from smoking, normal resting and exercise BP    Plan for next 30 days:   group class: Understanding Heart Disease, group class: Common Heart Medications, medication compliance, engage in regular exercise, check labels for sodium content, and monitor home BP    Group and Individual Education:  low sodium diet and heart failure and identifying smoking triggers    Readiness to change: Action:  (Changing behavior)

## 2024-06-28 ENCOUNTER — CLINICAL SUPPORT (OUTPATIENT)
Dept: CARDIAC REHAB | Facility: CLINIC | Age: 64
End: 2024-06-28
Payer: COMMERCIAL

## 2024-06-28 DIAGNOSIS — I50.22 CHRONIC SYSTOLIC HEART FAILURE (HCC): Primary | ICD-10-CM

## 2024-06-28 PROCEDURE — 93798 PHYS/QHP OP CAR RHAB W/ECG: CPT

## 2024-06-28 NOTE — RESULT ENCOUNTER NOTE
Detail Level: Zone Please call the patient regarding her abnormal result.  A1c still elevated but much better.  Be very careful with diet Levator Labii Superioris Units: 0 Expiration Date (Month Year): 09/18 Additional Area 3 Units: 10 Additional Area 2 Units: [address] Lot #: [id number] Additional Area 1 Location: High forehead 2.5 cm above brows Additional Area 3 Location: lateral brows Consent: Written consent obtained. Risks include but not limited to lid/brow ptosis, bruising, swelling, diplopia, temporary effect, incomplete chemical denervation. Additional Area 2 Location: glabella Topical Anesthesia?: BLT cream (benzocaine 20%, lidocaine 10%, tetracaine 10%) Dilution (U/ 0.1cc): 1.5 Post-Care Instructions: Patient instructed to not lie down for 4 hours and limit physical activity for 24 hours. Patient instructed not to travel by airplane for 48 hours. Length Of Topical Anesthesia Application (Optional): 15 minutes Additional Area 1 Units: [address] [facility]

## 2024-07-01 ENCOUNTER — APPOINTMENT (OUTPATIENT)
Dept: CARDIAC REHAB | Facility: CLINIC | Age: 64
End: 2024-07-01
Payer: COMMERCIAL

## 2024-07-02 ENCOUNTER — CLINICAL SUPPORT (OUTPATIENT)
Dept: CARDIAC REHAB | Facility: CLINIC | Age: 64
End: 2024-07-02
Payer: COMMERCIAL

## 2024-07-02 ENCOUNTER — NURSE TRIAGE (OUTPATIENT)
Age: 64
End: 2024-07-02

## 2024-07-02 ENCOUNTER — OFFICE VISIT (OUTPATIENT)
Dept: URGENT CARE | Facility: CLINIC | Age: 64
End: 2024-07-02
Payer: COMMERCIAL

## 2024-07-02 VITALS
TEMPERATURE: 97.8 F | HEIGHT: 68 IN | SYSTOLIC BLOOD PRESSURE: 114 MMHG | OXYGEN SATURATION: 95 % | BODY MASS INDEX: 31.1 KG/M2 | WEIGHT: 205.2 LBS | HEART RATE: 76 BPM | RESPIRATION RATE: 17 BRPM | DIASTOLIC BLOOD PRESSURE: 58 MMHG

## 2024-07-02 DIAGNOSIS — I50.22 CHRONIC SYSTOLIC HEART FAILURE (HCC): Primary | ICD-10-CM

## 2024-07-02 DIAGNOSIS — T14.8XXA BRUISING: Primary | ICD-10-CM

## 2024-07-02 PROCEDURE — S9083 URGENT CARE CENTER GLOBAL: HCPCS

## 2024-07-02 PROCEDURE — G0382 LEV 3 HOSP TYPE B ED VISIT: HCPCS

## 2024-07-02 PROCEDURE — 93798 PHYS/QHP OP CAR RHAB W/ECG: CPT

## 2024-07-02 NOTE — TELEPHONE ENCOUNTER
"Patient decided she is going to go [place] [facility] [place] since [place] is out of the office today.     Reason for Disposition   Swelling is painful to touch and no fever    Answer Assessment - Initial Assessment Questions  1. APPEARANCE of SWELLING: \"What does it look like?\" (e.g., lymph node, insect bite, mole)      Purple, warm, tender to touch   2. SIZE: \"How large is the swelling?\" (inches, cm or compare to coins)      Golf ball sized   3. LOCATION: \"Where is the swelling located?\"      R calf (outside)   4. ONSET: \"When did the swelling start?\"      Today   5. PAIN: \"Is it painful?\" If Yes, ask: \"How much?\"      Mild  6. ITCH: \"Does it itch?\" If Yes, ask: \"How much?\"      Denies   7. CAUSE: \"What do you think caused the swelling?\"      Unaware, wants to r/o DVT   8. OTHER SYMPTOMS: \"Do you have any other symptoms?\" (e.g., fever)      Denies    Protocols used: Skin Lump or Localized Swelling-ADULT-[place]    "

## 2024-07-02 NOTE — PROGRESS NOTES
[facility]        NAME: Vesna Langston is a 64 y.o. female  : 1960    MRN: [id number]  DATE: 2024  TIME: 4:43 PM    Assessment and Plan   Bruising [T14.8XXA]  1. Bruising          Bruising with local area of swelling to right lower leg.  Homans' sign negative and low suspicion for DVT given patient is on Plavix as well as Eliquis.  Advised conservative management by continuing to ice as well as using Tylenol for pain complaints.  Discussed red flag DVT signs and to report to the ER if experiencing    Patient Instructions       Follow up with PCP in 3-5 days.  Proceed to  ER if symptoms worsen.    If tests are performed, our office will contact you with results only if changes need to made to the care plan discussed with you at the visit. [facility]    Chief Complaint     Chief Complaint   Patient presents with   • Leg Pain     Pt states she woke up with a bump on her right lower leg that is warm to the touch, and tender to the touch, pt states she has a hx or blood clots. No known injury to the area.          History of Present Illness       64-year-old female with a past medical history of pulmonary embolism, atrial flutter, coronary artery disease, type 2 diabetes, on Eliquis presents with leg swelling and bruising. Denies any injury or trauma. Rates pain 2/10. States she has had blood clots while on blood thinners.  Ambulating without difficulty.  Denies any numbness, tingling, swelling in right lower leg.  States she has a few new bruises on her other leg and arm        Review of Systems   Review of Systems   Constitutional:  Negative for appetite change, chills, fatigue and fever.   Respiratory:  Negative for cough, shortness of breath, wheezing and stridor.    Cardiovascular:  Negative for chest pain and palpitations.   Skin:  Positive for color change.         Current Medications       Current Outpatient Medications:   •  albuterol (ProAir  HFA) 90 mcg/act inhaler, Inhale 2 puffs every 6 (six) hours as needed for wheezing, Disp: 8.5 g, Rfl: 0  •  amiodarone 200 mg tablet, Take 1 tablet (200 mg total) by mouth daily, Disp: 30 tablet, Rfl: 2  •  apixaban (ELIQUIS) 5 mg, Take 1 tablet (5 mg total) by mouth 2 (two) times a day, Disp: 60 tablet, Rfl: 1  •  atorvastatin (LIPITOR) 80 mg tablet, Take 1 tablet (80 mg total) by mouth every evening, Disp: 30 tablet, Rfl: 0  •  clopidogrel (PLAVIX) 75 mg tablet, Take 1 tablet (75 mg total) by mouth daily, Disp: 30 tablet, Rfl: 0  •  escitalopram (LEXAPRO) 10 mg tablet, Take 1 tablet (10 mg total) by mouth daily, Disp: 30 tablet, Rfl: 5  •  furosemide (LASIX) 20 mg tablet, Take 1 tablet (20 mg total) by mouth as needed (for rapid weight gain (3+ lbs overnight or 5+ lbs in 5-7 days) or worsening shortness of breath), Disp: 30 tablet, Rfl: 0  •  gabapentin (Neurontin) 100 mg capsule, Take 1 capsule (100 mg total) by mouth 2 (two) times a day, Disp: , Rfl:   •  HYDROcodone-acetaminophen (NORCO) 5-325 mg per tablet, Take 1 tablet by mouth every 6 (six) hours as needed for pain for up to 10 doses Max Daily Amount: 4 tablets, Disp: 10 tablet, Rfl: 0  •  levothyroxine (Synthroid) 50 mcg tablet, Take 1 tablet (50 mcg total) by mouth daily, Disp: 30 tablet, Rfl: 5  •  meclizine (ANTIVERT) 25 mg tablet, Take 1 tablet (25 mg total) by mouth every 8 (eight) hours as needed for dizziness, Disp: 30 tablet, Rfl: 0  •  metoprolol succinate (TOPROL-XL) 25 mg 24 hr tablet, Take 2 tablets (50 mg total) by mouth daily at bedtime, Disp: 180 tablet, Rfl: 5  •  Multiple Vitamin (MULTIVITAMIN) capsule, Take 1 capsule by mouth daily, Disp: , Rfl:   •  pantoprazole (PROTONIX) 40 mg tablet, Take 1 tablet (40 mg total) by mouth 2 (two) times a day before meals, Disp: 60 tablet, Rfl: 3  •  sacubitril-valsartan (Entresto) 24-26 MG TABS, Take 1 tablet by mouth 2 (two) times a day, Disp: 180 tablet, Rfl: 5  •  spironolactone (ALDACTONE) 25 mg  tablet, Take 1 tablet (25 mg total) by mouth daily, Disp: 90 tablet, Rfl: 5  •  sucralfate (CARAFATE) 1 g tablet, Take 1 tablet (1 g total) by mouth 4 (four) times a day (before meals and at bedtime), Disp: 120 tablet, Rfl: 3  •  Empagliflozin (Jardiance) 25 MG TABS, Take 1 tablet (25 mg total) by mouth every morning, Disp: 90 tablet, Rfl: 5    Current Allergies     Allergies as of 07/02/2024 - Reviewed 07/02/2024   Allergen Reaction Noted   • Shellfish-derived products - food allergy Anaphylaxis 06/10/2019   • Azithromycin Rash 10/10/2012            The following portions of the patient's history were reviewed and updated as appropriate: allergies, current medications, past family history, past medical history, past social history, past surgical history and problem list.     Past Medical History:   Diagnosis Date   • Acute respiratory insufficiency 03/22/2024   • Allergic    • Chronic HFrEF (heart failure with reduced ejection fraction) ([facility])    • Coronary artery disease    • COVID-19 virus infection 11/28/2022   • Diabetes mellitus ([facility])    • Disease of thyroid gland 4/09/2024   • Hyperlipidemia    • Hypoglycemia    • ICD (implantable cardioverter-defibrillator) in place    • Ischemic cardiomyopathy    • Lactic acidosis 03/22/2024   • Myocardial infarction ([facility]) 3/22/2024   • Otitis media 1966   • ST elevation myocardial infarction involving left anterior descending (LAD) coronary artery ([facility]) 03/22/2024   • Tobacco abuse    • Visual impairment 1967       Past Surgical History:   Procedure Laterality Date   • ADENOIDECTOMY     • APPENDECTOMY     • APPENDECTOMY LAPAROSCOPIC N/A 05/08/2024    Procedure: APPENDECTOMY LAPAROSCOPIC;  Surgeon: Lauryn Ullrich, DO;  Location: [facility];  Service: General   • BREAST EXCISIONAL BIOPSY Right 09/2000    cyst removed- benign   • CARDIAC CATHETERIZATION N/A 03/22/2024    Procedure: Cardiac pci;  Surgeon: Gabriel Myers MD;  [facility];  Service: Cardiology   •  "CARDIAC CATHETERIZATION N/A 2024    Procedure: Cardiac Coronary Angiogram;  Surgeon: Gabriel Myers MD;  [facility];  Service: Cardiology   • CARDIAC CATHETERIZATION N/A 2024    Procedure: Cardiac PCI AMI;  Surgeon: Gabriel Myers MD;  [facility];  Service: Cardiology   • CARDIAC CATHETERIZATION N/A 2024    Procedure: Cardiac IVUS;  Surgeon: Gabriel Myers MD;  [facility];  Service: Cardiology   • CARDIAC ELECTROPHYSIOLOGY PROCEDURE N/A 2024    Procedure: Cardiac icd implant;  Surgeon: Jeffy Lama MD;  [facility];  Service: Cardiology   •  SECTION     • ECTOPIC PREGNANCY SURGERY     • SEPTOPLASTY     • SPINE SURGERY  23   • TONSILLECTOMY         Family History   Adopted: Yes   Problem Relation Age of Onset   • Depression Mother    • Heart disease Father    • OCD Daughter          Medications have been verified.        Objective   /58   Pulse 76   Temp 97.8 °F (36.6 °C)   Resp 17   Ht 5' 8\" (1.727 m)   Wt 93.1 kg (205 lb 3.2 oz)   SpO2 95%   BMI 31.20 kg/m²        Physical Exam     Physical Exam  Vitals and nursing note reviewed.   Constitutional:       General: She is not in acute distress.     Appearance: Normal appearance. She is normal weight. She is not ill-appearing, toxic-appearing or diaphoretic.   Cardiovascular:      Rate and Rhythm: Normal rate and regular rhythm.      Pulses: Normal pulses.      Heart sounds: No murmur heard.     No friction rub. No gallop.   Pulmonary:      Effort: Pulmonary effort is normal. No respiratory distress.      Breath sounds: Normal breath sounds. No stridor. No wheezing, rhonchi or rales.   Chest:      Chest wall: No tenderness.   Musculoskeletal:      Right lower leg: Swelling present. No deformity, lacerations, tenderness or bony tenderness. No edema.        Legs:       Comments: Half-dollar sized area of bruising with very minor swelling.  +2 popliteal " pulse.  Homans' sign negative.  Ambulating without difficulty.  No tenderness to palpation.  No warmth.  No pitting edema   Neurological:      Mental Status: She is alert.

## 2024-07-03 ENCOUNTER — CLINICAL SUPPORT (OUTPATIENT)
Dept: CARDIAC REHAB | Facility: CLINIC | Age: 64
End: 2024-07-03
Payer: COMMERCIAL

## 2024-07-03 DIAGNOSIS — I50.22 CHRONIC SYSTOLIC HEART FAILURE (HCC): Primary | ICD-10-CM

## 2024-07-03 DIAGNOSIS — I50.22 CHRONIC HFREF (HEART FAILURE WITH REDUCED EJECTION FRACTION) (HCC): ICD-10-CM

## 2024-07-03 DIAGNOSIS — I21.3 STEMI (ST ELEVATION MYOCARDIAL INFARCTION) (HCC): ICD-10-CM

## 2024-07-03 DIAGNOSIS — I21.02 ST ELEVATION MYOCARDIAL INFARCTION INVOLVING LEFT ANTERIOR DESCENDING (LAD) CORONARY ARTERY (HCC): ICD-10-CM

## 2024-07-03 PROCEDURE — 93798 PHYS/QHP OP CAR RHAB W/ECG: CPT

## 2024-07-04 RX ORDER — FUROSEMIDE 20 MG/1
20 TABLET ORAL AS NEEDED
Qty: 30 TABLET | Refills: 0 | Status: SHIPPED | OUTPATIENT
Start: 2024-07-04

## 2024-07-05 ENCOUNTER — CLINICAL SUPPORT (OUTPATIENT)
Dept: CARDIAC REHAB | Facility: CLINIC | Age: 64
End: 2024-07-05
Payer: COMMERCIAL

## 2024-07-05 DIAGNOSIS — I50.22 CHRONIC SYSTOLIC HEART FAILURE (HCC): Primary | ICD-10-CM

## 2024-07-05 PROCEDURE — 93798 PHYS/QHP OP CAR RHAB W/ECG: CPT

## 2024-07-05 RX ORDER — CLOPIDOGREL BISULFATE 75 MG/1
75 TABLET ORAL DAILY
Qty: 30 TABLET | Refills: 5 | Status: SHIPPED | OUTPATIENT
Start: 2024-07-05 | End: 2025-01-01

## 2024-07-08 ENCOUNTER — OFFICE VISIT (OUTPATIENT)
Dept: CARDIOLOGY CLINIC | Facility: CLINIC | Age: 64
End: 2024-07-08
Payer: COMMERCIAL

## 2024-07-08 ENCOUNTER — APPOINTMENT (OUTPATIENT)
Dept: CARDIAC REHAB | Facility: CLINIC | Age: 64
End: 2024-07-08
Payer: COMMERCIAL

## 2024-07-08 VITALS
HEIGHT: 68 IN | WEIGHT: 205.3 LBS | BODY MASS INDEX: 31.11 KG/M2 | OXYGEN SATURATION: 98 % | HEART RATE: 69 BPM | DIASTOLIC BLOOD PRESSURE: 60 MMHG | SYSTOLIC BLOOD PRESSURE: 116 MMHG

## 2024-07-08 DIAGNOSIS — Z95.810 AICD (AUTOMATIC CARDIOVERTER/DEFIBRILLATOR) PRESENT: ICD-10-CM

## 2024-07-08 DIAGNOSIS — I26.99 PE (PULMONARY THROMBOEMBOLISM) (HCC): ICD-10-CM

## 2024-07-08 DIAGNOSIS — I48.92 ATRIAL FLUTTER, PAROXYSMAL (HCC): ICD-10-CM

## 2024-07-08 DIAGNOSIS — I50.20 HFREF (HEART FAILURE WITH REDUCED EJECTION FRACTION) (HCC): Primary | ICD-10-CM

## 2024-07-08 PROCEDURE — 99214 OFFICE O/P EST MOD 30 MIN: CPT | Performed by: STUDENT IN AN ORGANIZED HEALTH CARE EDUCATION/TRAINING PROGRAM

## 2024-07-08 NOTE — PROGRESS NOTES
"Advanced Heart Failure/Pulmonary Hypertension Outpatient Note - Vesna Langston 64 y.o. female MRN: [id number]    @ Encounter: [id number]    Assessment:  64 y.o. female PMH and acute problems listed later in this note (a partial list may also be included within 'assessment' section) p/w HF fu.  I first met Vesna Langston on 5/6/24.    Primarily ICM, HFrEF, LVEF 30-35%, nondilated LV  LHC 03/22/2024: received PCI to 100% stenosis of ostial/proximal LAD.   cMRI 03/26/2024: LVEF 20%. LVIDd 4.5 cm. \"Transmural scarring of the mid anterior, anteroseptal and inferoseptal walls, the apical anterior, septal and inferior walls and the apex.\"   Coronary artery disease  S/p MI in 03/2024; received PCI to 100% stenosis of ostial/proximal LAD.  History of monomorphic ventricular tachycardia              Per EP notes, felt to be scar mediated.               S/p secondary prevention ICD.               Continues on amiodarone per EP.   Atrial flutter, paroxysmal               [id number] = 5 (age, sex, HF, CAD, DM).              Anticoagulation on Eliquis.               Rhythm control: amiodarone per EP.  Hyperlipidemia  Diabetes mellitus, type II  Chronic back pain  History of tobacco abuse  4/7/24 CT: IMPRESSION:  Suspected small hemorrhage from the anterior wall of the distal gastric body with focal wall thickening. Bleeding due to underlying lesion or PUD is suspected. Recommend endoscopic correlation.  Past heavy NSAID use, 2024 stopped  incidental right lower lobe subsegmental pulmonary embolism 5/2024  Works in [employer], no heavy lifting she says      I have reviewed all pertinent patient data including but not limited to:        Lab Units 06/13/24  0712 05/17/24  0703 05/12/24  0457   CREATININE mg/dL 0.75 0.74 0.73         Lab Results   Component Value Date    K 3.8 06/13/2024     Lab Results   Component Value Date    HGBA1C 6.7 (H) 06/25/2024     Lab Results   Component Value Date    [id number] 17.074 (H) 04/22/2024     Lab " "Results   Component Value Date    LDLCALC 70 04/22/2024     Lab Results   Component Value Date     (H) 04/01/2024      No results found for: \"NTBNP\"       TODAY'S PLAN:     07/08/24  Warm, euvolemic  No new cardiac complaints, feels generally well  Feeling generally well at work and at cardiac rehab - she feels even better after each exercise session  Sglt2i stopped 2/2 yeast infx  Weight stable    Gdmt below  Limited by hypotension, appears to have some room  No changes today  Fu echo 10/2024 on max gdmt, ordered today  Has ICD for VT    On amio per EP    On AC+plavix  On high intens statin    Reviewed her extensive question list today  All questions answered    Strict RTC precautions given for RLE hematoma, resolving     Deranged Thyroids and DM per PCP    Follow up:  With me in 3 months after echo  In addition to follow up with their other medical providers    Key info from my prior notes:    Follows GI    Discussed therapeutic lifestyle changes, low-moderate intensity exercise, maintain acceptable hydration 64 oz water daily, salt restrict, Mediterranean vs DASH diet, weight loss  Avoid nsaids    Further review of return to work next week  Safest plan would be no driving x 3 mo - resume 6/2024 if remains stable    Pharmacotherapies / Neurohormonal Blockade:  --Beta Blocker: metoprolol succinate 50 qd  --ARNi / ACEi / ARB: entresto 24/26 bid  --Aldosterone Antagonist: aldactone 25 qd  --SGLT2 Inhibitor: jardiance 25 qd for HF and DM > subsequently Sglt2i stopped in 2024 2/2 yeast infx    --Diuretic: PRN Lasix 20 mg  Long discussion about evidence of worsening HF, when to self uptitrate home diuretic and call cardiology office     Sudden Cardiac Death Risk Reduction:  --[employer]tronic dual chamber ICD in situ since 03/2024 (secondary prevention).   --Interrogation from 04/12/2024:  MDT DUAL ICD/ ACTIVE SYSTEM IS MRI CONDITIONAL   DEVICE INTERROGATED IN THE [place] OFFICE: BATTERY VOLTAGE ADEQUATE-13 YRS. AP 2% "  0%. ALL AVAILABLE LEAD PARAMETERS & TRENDS WITHIN NORMAL LIMITS. 1 DEVICE CLASSIFIED VHR NOTED; NO THERAPIES NEEDED. PT ON AMIO, METO SUCC, & ELIQUIS. EF 35% (ECHO 2024). OPTI-VOL FLUID THRESHOLDS INITIALIZING. NO PROGRAMMING CHANGES MADE TO DEVICE PARAMETERS. WOUND CHECK: INCISION CLEAN AND DRY WITH EDGES APPROXIMATED; AQUACEL REMOVED. WOUND CARE AND RESTRICTIONS REVIEWED WITH PATIENT.   Electrical Resynchronization:  --Candidacy for BiV device: narrow QRS.      Advanced Therapies: Will continue to monitor.    Studies:  I have reviewed all pertinent patient data/labs/imaging where available, including but not limited to the below studies. Selected results may be displayed here but comprehensive listing is omitted for note clarity and can be found in the epic chart.    ECG.    Echo.    Stress.    Cath.    HPI:   64 y.o. female PMH and acute problems listed later in this note (a partial list may also be included within 'assessment' section) p/w HF fu.  I first met Vesna Langston on 5/6/24.  No new CP/SOB/dizziness/palpitations/syncope.  No new fatigue.  No new unintentional weight changes.  No new leg swelling, PND, pillow orthopnea.  No new fevers, chills, cough, nausea, vomiting, diarrhea, dysuria.      Interval History:  As noted in 'plan' section above and prior epic chart notes.    No new CP/SOB/dizziness/palpitations/syncope.  No new fatigue.  No new unintentional weight changes.  No new leg swelling, PND, pillow orthopnea.  No new fevers, chills, cough, nausea, vomiting, diarrhea, dysuria.    Past Medical History:   Diagnosis Date    Acute respiratory insufficiency 03/22/2024    Allergic     Chronic HFrEF (heart failure with reduced ejection fraction) (HCC)     Coronary artery disease     COVID-19 virus infection 11/28/2022    Diabetes mellitus ([facility])     Disease of thyroid gland 4/09/2024    Hyperlipidemia     Hypoglycemia     ICD (implantable cardioverter-defibrillator) in place     Ischemic cardiomyopathy      Lactic acidosis 03/22/2024    Myocardial infarction ([facility]) 3/22/2024    Otitis media 1966    ST elevation myocardial infarction involving left anterior descending (LAD) coronary artery ([facility]) 03/22/2024    Tobacco abuse     Visual impairment 1967     Patient Active Problem List    Diagnosis Date Noted    Acute appendicitis with localized peritonitis and gangrene, without perforation or abscess 05/08/2024    Acute pulmonary embolism without acute cor pulmonale ([facility]) 05/07/2024    History of gastric ulcer 05/07/2024    Generalized anxiety disorder 05/02/2024    Anemia due to acute blood loss 04/19/2024    Acquired hypothyroidism 04/19/2024    Constipation 04/10/2024    Coffee ground emesis 04/07/2024    Type 2 diabetes mellitus, without long-term current use of insulin ([facility]) 04/07/2024    Syncope 04/01/2024    Guaiac positive stools 04/01/2024    S/P ICD (internal cardiac defibrillator) procedure 03/27/2024    Chronic low back pain 03/25/2024    HFrEF (heart failure with reduced ejection fraction) ([facility]) 03/25/2024    Ischemic cardiomyopathy 03/24/2024    Coronary artery disease involving native coronary artery of native heart 03/23/2024    S/P coronary artery stent placement 03/23/2024    Atrial flutter, paroxysmal ([facility]) 03/22/2024    Hyperlipemia 03/22/2024    New onset type 2 diabetes mellitus ([facility]) 03/22/2024    Tobacco abuse 03/22/2024    History of sustained ventricular tachycardia 03/22/2024    Back pain 07/31/2022    Sciatica of left side 07/31/2022       ROS:  10 point ROS negative except as specified in HPI/interval history    Allergies   Allergen Reactions    Shellfish-Derived Products - Food Allergy Anaphylaxis    Azithromycin Rash     Current Outpatient Medications   Medication Instructions    albuterol (ProAir HFA) 90 mcg/act inhaler 2 puffs, Inhalation, Every 6 hours PRN    amiodarone 200 mg, Oral, Daily    apixaban (ELIQUIS) 5 mg, Oral, 2 times daily    atorvastatin (LIPITOR) 80 mg, Oral, Every evening     clopidogrel (PLAVIX) 75 mg, Oral, Daily    escitalopram (LEXAPRO) 10 mg, Oral, Daily    furosemide (LASIX) 20 mg, Oral, As needed    gabapentin (NEURONTIN) 100 mg, Oral, 2 times daily    HYDROcodone-acetaminophen (NORCO) 5-325 mg per tablet 1 tablet, Oral, Every 6 hours PRN    levothyroxine (SYNTHROID) 50 mcg, Oral, Daily    meclizine (ANTIVERT) 25 mg, Oral, Every 8 hours PRN    metoprolol succinate (TOPROL-XL) 50 mg, Oral, Daily at bedtime    Multiple Vitamin (MULTIVITAMIN) capsule 1 capsule, Oral, Daily    pantoprazole (PROTONIX) 40 mg, Oral, 2 times daily before meals    sacubitril-valsartan (Entresto) 24-26 MG TABS 1 tablet, Oral, 2 times daily    spironolactone (ALDACTONE) 25 mg, Oral, Daily    sucralfate (CARAFATE) 1 g, Oral, 4 times daily (before meals and at bedtime)      Social History     Socioeconomic History    Marital status:      Spouse name: Not on file    Number of children: 3    Years of education: Not on file    Highest education level: Not on file   Occupational History    Not on file   Tobacco Use    Smoking status: Former     Current packs/day: 0.00     Average packs/day: 1 pack/day for 24.2 years (24.2 ttl pk-yrs)     Types: Cigarettes     Start date: 2000     Quit date: 3/22/2024     Years since quittin.2    Smokeless tobacco: Never    Tobacco comments:     Smoked 0.5-1 ppd; quit in 2024.    Vaping Use    Vaping status: Never Used   Substance and Sexual Activity    Alcohol use: Not Currently     Alcohol/week: 1.0 standard drink of alcohol     Types: 1 Shots of liquor per week     Comment: rarely    Drug use: Never    Sexual activity: Not Currently     Partners: Female     Birth control/protection: Post-menopausal   Other Topics Concern    Not on file   Social History Narrative    Not on file     Social Determinants of Health     Financial Resource Strain: Low Risk  (2023)    Received from [facility], [facility]    Overall Financial  "Resource Strain (CARDIA)     Difficulty of Paying Living Expenses: Not hard at all   Food Insecurity: No Food Insecurity (4/8/2024)    Hunger Vital Sign     Worried About Running Out of Food in the Last Year: Never true     Ran Out of Food in the Last Year: Never true   Transportation Needs: No Transportation Needs (4/8/2024)    PRAPARE - Transportation     Lack of Transportation (Medical): No     Lack of Transportation (Non-Medical): No   Physical Activity: Not on file   Stress: No Stress Concern Present (2/20/2023)    Received [facility], [facility] [place] [facility] - Occupational Stress Questionnaire     Feeling of Stress : Not at all   Social Connections: Unknown (6/18/2024)    Received from [employer]     How often do you feel lonely or isolated from those around you? (Adult - for ages 18 years and over): Not on file   Intimate Partner Violence: Not At Risk (2/20/2023)    [facility], [facility]    Humiliation, Afraid, Rape, and Kick questionnaire     Fear of Current or Ex-Partner: No     Emotionally Abused: No     Physically Abused: No     Sexually Abused: No   Housing Stability: High Risk (4/8/2024)    Housing Stability Vital Sign     Unable to Pay for Housing in the Last Year: No     Number of Times Moved in the Last Year: 2     Homeless in the Last Year: No     Family History   Adopted: Yes   Problem Relation Age of Onset    Depression Mother     Heart disease Father     OCD Daughter        Physical Exam:  Vitals:    07/08/24 1322   BP: 116/60   BP Location: Left arm   Patient Position: Sitting   Cuff Size: Standard   Pulse: 69   SpO2: 98%   Weight: 93.1 kg (205 lb 4.8 oz)   Height: 5' 8\" (1.727 m)       Constitutional: NAD, non toxic  Ears/nose/mouth/throat: atraumatic  CV: RRR, nl S1S2, no murmurs/rubs/gallups, no JVD, no HJR  Resp: CTABL  GI: Soft, NTND  MSK: no " swollen joints in exposed areas  Extr: No edema, warm LE  Pysche: Normal affect  Neuro: appropriate in conversation  Skin: dry and intact in exposed areas    Labs & Results:  Lab Results   Component Value Date    SODIUM 139 06/13/2024    K 3.8 06/13/2024     06/13/2024    CO2 26 06/13/2024    BUN 10 06/13/2024    CREATININE 0.75 06/13/2024    GLUC 102 05/12/2024    CALCIUM 9.4 06/13/2024     Lab Results   Component Value Date    WBC 8.63 05/12/2024    HGB 9.0 (L) 05/12/2024    HCT 28.8 (L) 05/12/2024    MCV 91 05/12/2024     05/12/2024       Counseling / Coordination of Care  Time spent today 27 minutes.  Greater than 50% of total time was spent with the patient and / or family counseling and / or coordination of care.  We discussed diagnoses, most recent studies, tests and any changes in treatment plan.    Thank you for the opportunity to participate in the care of this patient.    Vitor Bell MD  Attending Physician  Advanced Heart Failure and Transplant Cardiology  [facility]

## 2024-07-09 ENCOUNTER — CLINICAL SUPPORT (OUTPATIENT)
Dept: CARDIAC REHAB | Facility: CLINIC | Age: 64
End: 2024-07-09
Payer: COMMERCIAL

## 2024-07-09 DIAGNOSIS — I48.92 ATRIAL FLUTTER, UNSPECIFIED TYPE (HCC): ICD-10-CM

## 2024-07-09 DIAGNOSIS — I50.22 CHRONIC SYSTOLIC HEART FAILURE (HCC): Primary | ICD-10-CM

## 2024-07-09 PROCEDURE — 93798 PHYS/QHP OP CAR RHAB W/ECG: CPT

## 2024-07-10 ENCOUNTER — CLINICAL SUPPORT (OUTPATIENT)
Dept: CARDIAC REHAB | Facility: CLINIC | Age: 64
End: 2024-07-10
Payer: COMMERCIAL

## 2024-07-10 DIAGNOSIS — I50.22 CHRONIC SYSTOLIC HEART FAILURE (HCC): Primary | ICD-10-CM

## 2024-07-10 PROCEDURE — 93798 PHYS/QHP OP CAR RHAB W/ECG: CPT

## 2024-07-12 ENCOUNTER — CLINICAL SUPPORT (OUTPATIENT)
Dept: CARDIAC REHAB | Facility: CLINIC | Age: 64
End: 2024-07-12
Payer: COMMERCIAL

## 2024-07-12 DIAGNOSIS — I50.22 CHRONIC SYSTOLIC HEART FAILURE (HCC): Primary | ICD-10-CM

## 2024-07-12 PROCEDURE — 93798 PHYS/QHP OP CAR RHAB W/ECG: CPT

## 2024-07-15 ENCOUNTER — CLINICAL SUPPORT (OUTPATIENT)
Dept: CARDIAC REHAB | Facility: CLINIC | Age: 64
End: 2024-07-15
Payer: COMMERCIAL

## 2024-07-15 DIAGNOSIS — I50.22 CHRONIC SYSTOLIC HEART FAILURE (HCC): Primary | ICD-10-CM

## 2024-07-15 PROCEDURE — 93798 PHYS/QHP OP CAR RHAB W/ECG: CPT

## 2024-07-17 ENCOUNTER — APPOINTMENT (OUTPATIENT)
Dept: CARDIAC REHAB | Facility: CLINIC | Age: 64
End: 2024-07-17
Payer: COMMERCIAL

## 2024-07-19 ENCOUNTER — APPOINTMENT (OUTPATIENT)
Dept: CARDIAC REHAB | Facility: CLINIC | Age: 64
End: 2024-07-19
Payer: COMMERCIAL

## 2024-07-22 ENCOUNTER — APPOINTMENT (OUTPATIENT)
Dept: CARDIAC REHAB | Facility: CLINIC | Age: 64
End: 2024-07-22
Payer: COMMERCIAL

## 2024-07-24 ENCOUNTER — APPOINTMENT (OUTPATIENT)
Dept: CARDIAC REHAB | Facility: CLINIC | Age: 64
End: 2024-07-24
Payer: COMMERCIAL

## 2024-07-26 ENCOUNTER — OFFICE VISIT (OUTPATIENT)
Dept: OBGYN CLINIC | Facility: CLINIC | Age: 64
End: 2024-07-26
Payer: COMMERCIAL

## 2024-07-26 ENCOUNTER — APPOINTMENT (OUTPATIENT)
Dept: CARDIAC REHAB | Facility: CLINIC | Age: 64
End: 2024-07-26
Payer: COMMERCIAL

## 2024-07-26 ENCOUNTER — HOSPITAL ENCOUNTER (OUTPATIENT)
Dept: RADIOLOGY | Facility: CLINIC | Age: 64
End: 2024-07-26
Payer: COMMERCIAL

## 2024-07-26 VITALS
WEIGHT: 210 LBS | HEIGHT: 68 IN | DIASTOLIC BLOOD PRESSURE: 78 MMHG | TEMPERATURE: 97.1 F | HEART RATE: 79 BPM | BODY MASS INDEX: 31.83 KG/M2 | OXYGEN SATURATION: 94 % | SYSTOLIC BLOOD PRESSURE: 140 MMHG

## 2024-07-26 DIAGNOSIS — M70.50 PES ANSERINE BURSITIS: ICD-10-CM

## 2024-07-26 DIAGNOSIS — G89.29 CHRONIC PAIN OF RIGHT KNEE: Primary | ICD-10-CM

## 2024-07-26 DIAGNOSIS — M25.561 CHRONIC PAIN OF RIGHT KNEE: ICD-10-CM

## 2024-07-26 DIAGNOSIS — M25.561 CHRONIC PAIN OF RIGHT KNEE: Primary | ICD-10-CM

## 2024-07-26 DIAGNOSIS — M17.5 OTHER SECONDARY OSTEOARTHRITIS OF RIGHT KNEE: ICD-10-CM

## 2024-07-26 DIAGNOSIS — G89.29 CHRONIC PAIN OF RIGHT KNEE: ICD-10-CM

## 2024-07-26 PROCEDURE — 73562 X-RAY EXAM OF KNEE 3: CPT

## 2024-07-26 PROCEDURE — 1160F RVW MEDS BY RX/DR IN RCRD: CPT | Performed by: STUDENT IN AN ORGANIZED HEALTH CARE EDUCATION/TRAINING PROGRAM

## 2024-07-26 PROCEDURE — 1159F MED LIST DOCD IN RCRD: CPT | Performed by: STUDENT IN AN ORGANIZED HEALTH CARE EDUCATION/TRAINING PROGRAM

## 2024-07-26 PROCEDURE — 99203 OFFICE O/P NEW LOW 30 MIN: CPT | Performed by: STUDENT IN AN ORGANIZED HEALTH CARE EDUCATION/TRAINING PROGRAM

## 2024-07-26 NOTE — PROGRESS NOTES
CARDIAC REHABILITATION ASSESSMENT AND INDIVIDUALIZED TREATMENT PLAN  60 DAY      Today's date: 2024   # of Exercise Sessions Completed: 19  Patient name: Vesna Langston      : 1960  Age: 64 y.o.       MRN: [id number]  Referring Physician: Everett Villareal MD  Cardiologist: Vitor Bell MD  Provider: [place]  Clinician: Velia Villalta MS, [facility]      Comments:   Vesna is tolerating exercise well in cardiac rehab. She completes 40-42 minutes of exercise at 2.5-3.0 METs without cardiac complaints. She has been tolerating slow increases in weight while weight training. Vesna is limited by her back injury - no twisting or bending tolerated. NSR with intermittent pacing on telemetry with rare PVCs. Resting HR 62-82 bpm with increased HR to  bpm. Normal resting and exercise BP. Vesna has been very actively reading her food labels, paying attention to sodium, added sugar and saturated fat content. She has maintained her weight at 208-209 lb. Her depression symptoms have improved and she is enjoying cardiac rehab so far.       Dx:   Encounter Diagnosis   Name Primary?    Chronic systolic heart failure (HCC) Yes       Description of Diagnosis: S/p MI in 2024; received PCI to 100% stenosis of ostial/proximal LAD.   Date of onset: 3/22/24  Other Cardiac History: HFrEF, LVEF 30%; Atrial flutter; monomorphic ventricular tachycardia - S/p secondary prevention ICD (3/27/24)      ASSESSMENT    Medical History:   Past Medical History:   Diagnosis Date    Acute respiratory insufficiency 2024    Allergic     Chronic HFrEF (heart failure with reduced ejection fraction) ([facility])     Coronary artery disease     COVID-19 virus infection 2022    Diabetes mellitus (HCC)     Disease of thyroid gland 2024    Hyperlipidemia     Hypoglycemia     ICD (implantable cardioverter-defibrillator) in place     Ischemic cardiomyopathy     Lactic acidosis 2024    Myocardial infarction (HCC) 3/22/2024    Otitis media  1966    ST elevation myocardial infarction involving left anterior descending (LAD) coronary artery ([facility]) 03/22/2024    Tobacco abuse     Visual impairment 1967       Family History:  Family History   Adopted: Yes   Problem Relation Age of Onset    Depression Mother     Heart disease Father     OCD Daughter        Allergies:   Shellfish-derived products - food allergy and Azithromycin    Current Medications:   Current Outpatient Medications   Medication Sig Dispense Refill    albuterol (ProAir HFA) 90 mcg/act inhaler Inhale 2 puffs every 6 (six) hours as needed for wheezing 8.5 g 0    amiodarone 200 mg tablet Take 1 tablet (200 mg total) by mouth daily 30 tablet 2    apixaban (ELIQUIS) 5 mg Take 1 tablet (5 mg total) by mouth 2 (two) times a day 60 tablet 5    atorvastatin (LIPITOR) 80 mg tablet Take 1 tablet (80 mg total) by mouth every evening 30 tablet 0    clopidogrel (PLAVIX) 75 mg tablet Take 1 tablet (75 mg total) by mouth daily 30 tablet 5    escitalopram (LEXAPRO) 10 mg tablet Take 1 tablet (10 mg total) by mouth daily 30 tablet 5    furosemide (LASIX) 20 mg tablet Take 1 tablet (20 mg total) by mouth as needed (for rapid weight gain (3+ lbs overnight or 5+ lbs in 5-7 days) or worsening shortness of breath) 30 tablet 0    gabapentin (Neurontin) 100 mg capsule Take 1 capsule (100 mg total) by mouth 2 (two) times a day (Patient taking differently: Take 100 mg by mouth 3 (three) times a day)      HYDROcodone-acetaminophen (NORCO) 5-325 mg per tablet Take 1 tablet by mouth every 6 (six) hours as needed for pain for up to 10 doses Max Daily Amount: 4 tablets 10 tablet 0    levothyroxine (Synthroid) 50 mcg tablet Take 1 tablet (50 mcg total) by mouth daily 30 tablet 5    meclizine (ANTIVERT) 25 mg tablet Take 1 tablet (25 mg total) by mouth every 8 (eight) hours as needed for dizziness 30 tablet 0    metoprolol succinate (TOPROL-XL) 25 mg 24 hr tablet Take 2 tablets (50 mg total) by mouth daily at bedtime 180  tablet 5    Multiple Vitamin (MULTIVITAMIN) capsule Take 1 capsule by mouth daily      pantoprazole (PROTONIX) 40 mg tablet Take 1 tablet (40 mg total) by mouth 2 (two) times a day before meals 60 tablet 3    sacubitril-valsartan (Entresto) 24-26 MG TABS Take 1 tablet by mouth 2 (two) times a day 180 tablet 5    spironolactone (ALDACTONE) 25 mg tablet Take 1 tablet (25 mg total) by mouth daily 90 tablet 5    sucralfate (CARAFATE) 1 g tablet Take 1 tablet (1 g total) by mouth 4 (four) times a day (before meals and at bedtime) 120 tablet 3     No current facility-administered medications for this visit.       Medication compliance: Yes   Comments: Pt reports to be compliant with medications    Physical Limitations: History of back surgery with complications, PT for 2 yrs now. Recent appendectomy, 10 lb weight restriction    Fall Risk: High   Comments: Reports a fall in the past 6 months (syncope x 4)    Cultural needs: None      CAD Risk Factors:  Cholesterol: Yes  HTN: No  DM: Type 2   Obesity: Yes   Inactivity: Yes      EXERCISE ASSESSMENT:     Initial Fitness Assessment: 6MWT:  Resting:    Resting:  BP: 120/72  HR: 60 bpm, Exercise:  BP: 146/74  HR: 108 bpm, METs:  2.6 (1,075 ft), ECG Summary: NSR, intermittent atrial pacing, Symptoms: very slightly labored breathing with faster walking, recovered quickly, and Comments: walked holding cane due to recommendation from orthopedic surgeon      Functional Status Prior to Diagnosis for Treatment:   Occupation: part time job  at [employer]  Recreation/Physical Activity: gardening, cooking  ADL’s: limited by Orthopedic limitations   [place]: able to perform self-care  Home exercise equipment:  None  Home exercise: walking a lot at work    Current Functional Status:  Occupation: part time work -  4 hours/day - [employer]  Recreation/Physical Activity: lawn mowing, weedwacker and leaf blowing (5-7 lb); watching grandchildren; cross-stitch; craft  wreaths  ADL’s:Capable of performing light ADLs only limited by Orthopedic limitations   [place]: able to perform self-care  Home exercise: walking 1 mile most days of the week    Functional Capacity Screening Tool:  [facility] Activity Status Index:  5.07 METs      PSYCHOSOCIAL ASSESSMENT:    Depression screening:  PHQ-9 = 8    Interpretation:  5-9 = Mild Depression  Anxiety screening:  BECCA-7 = 7    Interpretation: 5-9 = Mild anxiety    Pt self-report of depression and anxiety   Patient reports feelings of depression   Reports sufficient emotional support and is compliant with medical therapy (potentially short term)      Self-reported stress level:  5      Quality of Life Screen:  (Higher score indicates disease impact on QOL)  [facility] COOP score: 24/40        Social Support:   children, grandchildren, neighbors, and friends     Psychosocial Assessment as it relates to rehabilitation:   Patient denies issues with his/her family or home life that may affect their rehabilitation efforts.       NUTRITION ASSESSMENT:    Rate Your Plate Score: 73/81    Diabetes: T2D, Patient monitors BS 1 times/day, Patient reported fasting -125  A1c: 7.9%    last measured: 3/22/24    Lipid management: Last lipid profile 4/22/24  Chol 142    HDL 45  LDL 70    Current Dietary Habits:  Reducing sodium, increased fresh fruit and vegetables      OTHER CORE COMPONENT ASSESSMENT:    Tobacco Use:     Pt quit 3/22/24   and has abstained - was smoking 0.5 -1.0 ppd and had cut back and taking Chantix prior to MI    Anginal Symptoms:  SOB   NTG use: No prescription        INDIVIDUALIZED TREATMENT PLAN    See outlined plan of care below for specific patient goals in each component of care.        EXERCISE GOAL and PLAN    SMART Goals:   improved DASI score by 10%  increased exercise capacity by 40% based on peak METs tolerated in cardiac rehab exercise session  maintain > 150 minutes per week of moderate intensity exercise  improved  6MWT distance by 10%    Patient Specific EXERCISE GOALS:       attend rehab regularly, start a home exercise program, and improve mobility and exercise tolerance, join [employer], 5K walk in Oct    Progress toward SMART and personal Exercise goals:  pt walking 1 mile each day and going to PT 2 days/week; is back to work part time, compliant with cardiac rehab exercise (away on 2 week vacation)    Plan for next 30 days:    education on home exercise guidelines, home exercise 30+ mins 2 days opposite CR, and continue with gradual increase in exercise intensity - prep for possible 5k walk in October    The patient was counseled on exercise guidelines to achieve a minimum of 150 mins/wk of moderate intensity (RPE 4-6)   exercise and encouraged to add 1-2 days of exercise on opposite days of cardiac rehab as tolerated.     Current Aerobic Exercise Prescription:      Frequency: 3 days/week   Supplement with home exercise 2+ days/wk as tolerated       Minutes: 40-42 min         METS: 2.5-3.0           HR:  bpm   RPE: 4-6         Modalities: Treadmill, UBE, NuStep, and Recumbent bike     Exercise workloads will be progressed gradually as tolerated, within limits of patient's ability, and according to the patient's   response to the exercise program.      Aerobic Exercise Prescription - 30 day goal:   Frequency: 3 days/week of cardiac rehab       Supplement with home exercise 2+ days/wk as tolerated    Minutes: 40       >150 mins/wk of moderate intensity exercise   METS: 2-3.5   HR:  RHR +30-40bpm ( bpm)/107-126 bpm (50-70% HRR)     RPE: 4-6   Modalities: Treadmill, UBE, Lifecycle, NuStep, Recumbent bike, and Room walking    Strength trainin-3 days / week  12-15 repetitions  1-2 sets per modality   With 10 lb restriction and in cooperation with PT   Modalities: Leg Press, Chest Press, Lateral Raise, Arm Extension, Arm Curl, Front Raises, Shoulder Shrugs, and Calf Raises    Home Exercise: Type: walking,  Frequency: 5-7 days/week, Distance 1 mile    Group and Individual Education: benefit of exercise for CAD risk factors, AHA guidelines to achieve >150 mins/wk of moderate exercise, RPE scale, and Group class: Risk Factors for Heart Disease     Readiness to change: Action:  (Changing behavior)      NUTRITION GOALS AND PLAN    Weight control:    Starting weight: 209.8 lb   Current weight: 209.6 lb    SMART Goals:   reduced BMI to < 25, reduced waist circumference <35 inches (F), fasting BG , and Improved Rate Your Plate score  >64    Patient Specific NUTRITION GOALS:     30 lb weight loss    Patient's progress toward SMART and personal Nutrition goals:   Reading food labels, received education on healthy eating and food labels - no weight loss noted    Plan for next 30 days:   increase daily intake of fruits and vegetables and rarely/never eat salty snacks (no seafood - allergic)    Group and Individual Education:   heart healthy eating principles  low sodium diet  nutrition for  lipid management  exercise and diabetes management   group class: Heart Healthy Eating  group class:  Label Reading    Readiness to change: Action:  (Changing behavior)      PSYCHOSOCIAL ASSESSMENT AND PLAN    Psychosocial Assessment as it relates to rehabilitation:   Patient denies issues with his/her family or home life that may affect their rehabilitation efforts.     SMART Goals:     Reduce perceived stress to 1-3/10 and PHQ-9 - reduced severity by one level    Patient Specific PSYCHOCOSOCIAL GOALS:     maintain compliance with medical therapy, spend time with family, and vacation with family end of July    Patient's progress toward SMART and personal Psychosocial goals:   Received education on stress management, reports improvement in depression symptoms - away on vacation currently    Plan for next 30 days:   Exercise, Spend time outdoors, Keep a positive mindset, Enjoy family, and Return to previous social activity    Group and  Individual Education: signs/sxs of depression, benefits of a positive support system, depression and CAD, and class:  Stress and Your Health     Readiness to change: Action:  (Changing behavior)      OTHER CORE COMPONENTS GOALS and PLAN    Blood Pressure will be monitored throughout the program and cardiologist will be notified of elevated trends.      Tobacco Intervention:   Pt quit 3/22/24 and has abstained since quitting.      SMART Goals:   medication compliance    Patient Specific CORE COMPONENT GOALS:    Improved EF%    Progress toward SMART and personal Core Component goals:    Continues to abstain from smoking, normal resting and exercise BP    Plan for next 30 days:   group class: Common Heart Medications, medication compliance, engage in regular exercise, check labels for sodium content, and monitor home BP    Group and Individual Education:  low sodium diet and heart failure, identifying smoking triggers, and group class: Understanding Heart Disease    Readiness to change: Action:  (Changing behavior)

## 2024-07-26 NOTE — PROGRESS NOTES
1. Chronic pain of right knee  Ambulatory Referral to Orthopedic Surgery    XR knee 3 vw right non injury      2. Pes anserine bursitis        3. Other secondary osteoarthritis of right knee          Orders Placed This Encounter   Procedures    XR knee 3 vw right non injury        Imaging Studies (I personally reviewed images in PACS and report):      X-ray right knee 7/26/2024: Mild tricompartmental osteophytic changes evidence by marginal osteophytes.  Superior patellar enthesophyte from quadriceps tendon.  Small joint effusion.                IMPRESSION:  Acute on chronic right knee pain  History of fall on right knee in 2017-she has an MRI report today as noted above indicating osteoarthritic changes as well as medial/lateral meniscal tears  Furthermore, pain acutely aggravating while starting cardiac rehab due to chronic systolic heart failure  Pain seems to be localized over the medial aspect of her knee/proximal tibia  Radiographic imaging notes degenerative changes  I suspect pes anserine syndrome/bursitis given her clinical exam and history as well as her progressive return to activities with cardiac rehab.  Other differential could include osteoarthritic pain as well as degenerative meniscal pain     Other factors:  BMI 32  Congestive heart failure - reportedly under restrictions of no strenuous activities and seeing cardiac rehab currently  History of defibrillator implant  Type 2 diabetes    PLAN:    Clinical exam and radiographic imaging reviewed with patient today, with impression as per above. I have discussed with the patient the pathophysiology of this diagnosis and reviewed how the examination correlates with this diagnosis.    Prior MRI imaging report reviewed with patient today as noted above.  I counseled given the MRI findings of her degenerative meniscal and osteoarthritic tears that this could be an aspect of pain.  On clinical exam her pain does seem more localized over the pes anserine  "aspect of her knee and likely aggravated given she is recently undergoing therapy in regards to cardiac rehab as she is mostly been restricted from strenuous activities previously.  Of note, patient states that she is restricted from any prednisone/cortisone interventions due to her cardiac disease history.  Imaging obtained/reviewed as per above. I will follow up official radiology interpretation.  Treatment options were discussed at length, including risks and benefits; in regards to her right knee, I counseled that if she is unable to undergo cortisone injections, there could be consideration for an intra-articular viscosupplementation injection in regards to pain relief.  Previously had considered a pes anserine cortisone injection but given that patient states she cannot undergo cortisone this was option will have to be deferred.  I counseled her she could discuss with her cardiologist/PCP if a cortisone injection can be considered in the future.  Otherwise in regards to pain control I counseled use of heat/ice therapy torments on/off, acetaminophen, compression knee sleeve during exercising activities as well as reducing the resistance on exercise bikes if possible.  I also supplemented her with home exercises in regards to pes anserine syndrome.  I did consider referring her to formal PT but she deferred this option given she is currently in cardiac rehab.    Return if symptoms worsen or fail to improve.    Portions of the record may have been created with voice recognition software. Occasional wrong word or \"sound a like\" substitutions may have occurred due to the inherent limitations of voice recognition software. Read the chart carefully and recognize, using context, where substitutions have occurred.     CHIEF COMPLAINT:  Right knee pain      HPI:  Vesna Langston is a 64 y.o. female  who presents for       Visit 7/26/2024 :  Initial evaluation of right knee pain  Of note, patient reports a prior work injury " of her right knee in the past from a fall.  She did bring in an imaging study from 2017 of her MRI as noted above.  Images are not available to review today.  Reports she did not end up undergoing surgical interventions for her right knee and was treated conservatively with injections, bracing, physical therapy for period of time.  She states overall these interventions provided some relief but never felt that her pain was fully resolved.  Furthermore, she is currently undergoing cardiac rehab given her history of systolic congestive heart failure, h/o STEMI.  She states she is under a restriction of no strenuous activities.  She states she previously underwent cortisone injections but due to her cardiac condition, she was told that she can no longer receive cortisone/prednisone.  In regards to her right knee pain, she reports has been worsening over the past several weeks.  No precipitating injury.  She states the pain is mainly over the medial aspect of her knee.  Despite being on a limited activity restriction from cardiology, she feels it can be aggravated after performing stationary biking.  Describes the pain as a burning sensation of moderate to severe intensity depending on how active she is.  She denies any noticeable swelling.  Denies any discoloration.  Reports stiffness with knee range of motion's when aggravated.  Reports crepitus of her knee which is chronic.  In regards to treatment she reports use of Tylenol, icing and resting all of which provide some relief.  She is unsure if her prior injury/meniscal tears in the past are contributing factor as she does not want to undergo surgical intervention if possible.      Medical, Surgical, Family, and Social History    Past Medical History:   Diagnosis Date    Acute respiratory insufficiency 03/22/2024    Allergic     Chronic HFrEF (heart failure with reduced ejection fraction) ([facility])     Coronary artery disease     COVID-19 virus infection 11/28/2022     Diabetes mellitus (HCC)     Disease of thyroid gland 2024    Hyperlipidemia     Hypoglycemia     ICD (implantable cardioverter-defibrillator) in place     Ischemic cardiomyopathy     Lactic acidosis 2024    Myocardial infarction (HCC) 3/22/2024    Otitis media 1966    ST elevation myocardial infarction involving left anterior descending (LAD) coronary artery (HCC) 2024    Tobacco abuse     Visual impairment 1967     Past Surgical History:   Procedure Laterality Date    ADENOIDECTOMY      APPENDECTOMY      APPENDECTOMY LAPAROSCOPIC N/A 2024    Procedure: APPENDECTOMY LAPAROSCOPIC;  Surgeon: Lauryn Ullrich, DO;  Location: [facility];  Service: General    BREAST EXCISIONAL BIOPSY Right 2000    cyst removed- benign    CARDIAC CATHETERIZATION N/A 2024    Procedure: Cardiac pci;  Surgeon: Gabriel Myers MD;  [facility];  Service: Cardiology    CARDIAC CATHETERIZATION N/A 2024    Procedure: Cardiac Coronary Angiogram;  Surgeon: Gabriel Myers MD;  [facility];  Service: Cardiology    CARDIAC CATHETERIZATION N/A 2024    Procedure: Cardiac PCI AMI;  Surgeon: Gabriel Myers MD;  [facility];  Service: Cardiology    CARDIAC CATHETERIZATION N/A 2024    Procedure: Cardiac IVUS;  Surgeon: Gabriel Myers MD;  [facility];  Service: Cardiology    CARDIAC ELECTROPHYSIOLOGY PROCEDURE N/A 2024    Procedure: Cardiac icd implant;  Surgeon: Jeffy Lama MD;  [facility];  Service: Cardiology     SECTION      ECTOPIC PREGNANCY SURGERY      SEPTOPLASTY      SPINE SURGERY  23    TONSILLECTOMY       Social History   Social History     Substance and Sexual Activity   Alcohol Use Not Currently    Alcohol/week: 1.0 standard drink of alcohol    Types: 1 Shots of liquor per week    Comment: rarely     Social History     Substance and Sexual Activity   Drug Use Never     Social History     Tobacco  "Use   Smoking Status Former    Current packs/day: 0.00    Average packs/day: 1 pack/day for 24.2 years (24.2 ttl pk-yrs)    Types: Cigarettes    Start date: 2000    Quit date: 3/22/2024    Years since quittin.3   Smokeless Tobacco Never   Tobacco Comments    Smoked 0.5-1 ppd; quit in 2024.      Family History   Adopted: Yes   Problem Relation Age of Onset    Depression Mother     Heart disease Father     OCD Daughter      Allergies   Allergen Reactions    Shellfish-Derived Products - Food Allergy Anaphylaxis    Azithromycin Rash          Physical Exam  /78 (BP Location: Left arm, Patient Position: Sitting, Cuff Size: Standard)   Pulse 79   Temp (!) 97.1 °F (36.2 °C)   Ht 5' 8\" (1.727 m)   Wt 95.3 kg (210 lb)   SpO2 94%   BMI 31.93 kg/m²     Constitutional:  see vital signs  Gen: well-developed, normocephalic/atraumatic, well-groomed  Eyes: No inflammation or discharge of conjunctiva or lids; sclera clear   Pulmonary/Chest: Effort normal. No respiratory distress.     Ortho Exam  Right Knee Exam:  Erythema: no  Swelling: no  Increased Warmth: no  Tenderness: +pes anserine bursa aspect of right medial proximal tibia, +mild pain along MJL  ROM: 0-130  Knee flexion strength: 5/5  Knee extension strength: 5/5  Patellar Apprehension: negative  Patellar Grind: +  Varus laxity: negative  Valgus laxity: negative  [facility]: +mild discomfort of medial knee     No calf tenderness to palpation       Procedures        "

## 2024-07-29 ENCOUNTER — CLINICAL SUPPORT (OUTPATIENT)
Dept: CARDIAC REHAB | Facility: CLINIC | Age: 64
End: 2024-07-29
Payer: COMMERCIAL

## 2024-07-29 DIAGNOSIS — K25.9 MULTIPLE GASTRIC ULCERS: ICD-10-CM

## 2024-07-29 DIAGNOSIS — I50.22 CHRONIC SYSTOLIC HEART FAILURE (HCC): Primary | ICD-10-CM

## 2024-07-29 PROCEDURE — 93798 PHYS/QHP OP CAR RHAB W/ECG: CPT

## 2024-07-30 DIAGNOSIS — I50.22 CHRONIC HFREF (HEART FAILURE WITH REDUCED EJECTION FRACTION) (HCC): ICD-10-CM

## 2024-07-30 RX ORDER — SUCRALFATE 1 G/1
TABLET ORAL
Qty: 120 TABLET | Refills: 3 | Status: SHIPPED | OUTPATIENT
Start: 2024-07-30

## 2024-07-30 RX ORDER — FUROSEMIDE 20 MG/1
20 TABLET ORAL AS NEEDED
Qty: 90 TABLET | Refills: 1 | Status: SHIPPED | OUTPATIENT
Start: 2024-07-30

## 2024-07-31 ENCOUNTER — APPOINTMENT (OUTPATIENT)
Dept: LAB | Facility: HOSPITAL | Age: 64
End: 2024-07-31
Payer: COMMERCIAL

## 2024-07-31 ENCOUNTER — CLINICAL SUPPORT (OUTPATIENT)
Dept: CARDIAC REHAB | Facility: CLINIC | Age: 64
End: 2024-07-31
Payer: COMMERCIAL

## 2024-07-31 DIAGNOSIS — I21.02 ST ELEVATION MYOCARDIAL INFARCTION INVOLVING LEFT ANTERIOR DESCENDING (LAD) CORONARY ARTERY (HCC): ICD-10-CM

## 2024-07-31 DIAGNOSIS — I50.22 CHRONIC SYSTOLIC HEART FAILURE (HCC): Primary | ICD-10-CM

## 2024-07-31 DIAGNOSIS — I21.3 STEMI (ST ELEVATION MYOCARDIAL INFARCTION) (HCC): ICD-10-CM

## 2024-07-31 DIAGNOSIS — E78.5 HYPERLIPIDEMIA, UNSPECIFIED HYPERLIPIDEMIA TYPE: ICD-10-CM

## 2024-07-31 LAB
ERYTHROCYTE [DISTWIDTH] IN BLOOD BY AUTOMATED COUNT: 16.3 % (ref 11.6–15.1)
HCT VFR BLD AUTO: 40.4 % (ref 34.8–46.1)
HGB BLD-MCNC: 12.4 G/DL (ref 11.5–15.4)
MCH RBC QN AUTO: 25.3 PG (ref 26.8–34.3)
MCHC RBC AUTO-ENTMCNC: 30.7 G/DL (ref 31.4–37.4)
MCV RBC AUTO: 82 FL (ref 82–98)
PLATELET # BLD AUTO: 325 THOUSANDS/UL (ref 149–390)
PMV BLD AUTO: 9.7 FL (ref 8.9–12.7)
RBC # BLD AUTO: 4.91 MILLION/UL (ref 3.81–5.12)
WBC # BLD AUTO: 9.11 THOUSAND/UL (ref 4.31–10.16)

## 2024-07-31 PROCEDURE — 93798 PHYS/QHP OP CAR RHAB W/ECG: CPT

## 2024-08-01 ENCOUNTER — IN-CLINIC DEVICE VISIT (OUTPATIENT)
Dept: CARDIOLOGY CLINIC | Facility: CLINIC | Age: 64
End: 2024-08-01
Payer: COMMERCIAL

## 2024-08-01 DIAGNOSIS — Z95.810 AICD (AUTOMATIC CARDIOVERTER/DEFIBRILLATOR) PRESENT: Primary | ICD-10-CM

## 2024-08-01 PROCEDURE — 93283 PRGRMG EVAL IMPLANTABLE DFB: CPT | Performed by: INTERNAL MEDICINE

## 2024-08-01 RX ORDER — ATORVASTATIN CALCIUM 80 MG/1
80 TABLET, FILM COATED ORAL EVERY EVENING
Qty: 100 TABLET | Refills: 1 | Status: SHIPPED | OUTPATIENT
Start: 2024-08-01

## 2024-08-01 NOTE — PROGRESS NOTES
Results for orders placed or performed in visit on 08/01/24   Cardiac EP device report    Narrative    MDT DUAL ICD (MVP ON) / ACTIVE SYSTEM IS MRI CONDITIONAL  DEVICE INTERROGATED IN THE [place] OFFICE. BATTERY VOLTAGE ADEQUATE(12.4 YRS). AP 21%  <0.1% ALL LEAD PARAMETERS WITHIN NORMAL LIMITS. NO NEW SIGNIFICANT HIGH RATE EPISODES. OPTI-VOL WITHIN NORMAL LIMITS. NO PROGRAMMING CHANGES MADE TO DEVICE PARAMETERS. NORMAL DEVICE FUNCTION. AM

## 2024-08-02 ENCOUNTER — CLINICAL SUPPORT (OUTPATIENT)
Dept: CARDIAC REHAB | Facility: CLINIC | Age: 64
End: 2024-08-02
Payer: COMMERCIAL

## 2024-08-02 DIAGNOSIS — I50.22 CHRONIC SYSTOLIC HEART FAILURE (HCC): Primary | ICD-10-CM

## 2024-08-02 PROCEDURE — 93798 PHYS/QHP OP CAR RHAB W/ECG: CPT

## 2024-08-03 DIAGNOSIS — K25.9 MULTIPLE GASTRIC ULCERS: ICD-10-CM

## 2024-08-04 RX ORDER — PANTOPRAZOLE SODIUM 40 MG/1
40 TABLET, DELAYED RELEASE ORAL
Qty: 60 TABLET | Refills: 3 | Status: SHIPPED | OUTPATIENT
Start: 2024-08-04

## 2024-08-05 ENCOUNTER — CLINICAL SUPPORT (OUTPATIENT)
Dept: CARDIAC REHAB | Facility: CLINIC | Age: 64
End: 2024-08-05
Payer: COMMERCIAL

## 2024-08-05 DIAGNOSIS — I50.22 CHRONIC SYSTOLIC HEART FAILURE (HCC): Primary | ICD-10-CM

## 2024-08-05 PROCEDURE — 93798 PHYS/QHP OP CAR RHAB W/ECG: CPT

## 2024-08-07 ENCOUNTER — CLINICAL SUPPORT (OUTPATIENT)
Dept: CARDIAC REHAB | Facility: CLINIC | Age: 64
End: 2024-08-07
Payer: COMMERCIAL

## 2024-08-07 DIAGNOSIS — I50.22 CHRONIC SYSTOLIC HEART FAILURE (HCC): Primary | ICD-10-CM

## 2024-08-07 PROCEDURE — 93798 PHYS/QHP OP CAR RHAB W/ECG: CPT

## 2024-08-07 NOTE — PROGRESS NOTES
Progress Note - Electrophysiology-Cardiology (EP)   Vesna Langston 64 y.o. female MRN: [id number]  Unit/Bed#:  Encounter: [id number]           1. HFrEF (heart failure with reduced ejection fraction) (HCC)  POCT ECG    Comprehensive metabolic panel      2. On amiodarone therapy  Spirometry with diffusing capacity    TSH, 3rd generation    T4, free    Comprehensive metabolic panel    Ambulatory Referral to Ophthalmology      3. Atrial flutter, paroxysmal (HCC)  Spirometry with diffusing capacity    TSH, 3rd generation    T4, free    Comprehensive metabolic panel    Ambulatory Referral to Ophthalmology      4. Ischemic cardiomyopathy        5. Coronary artery disease involving native coronary artery of native heart without angina pectoris        6. Acute pulmonary embolism without acute cor pulmonale, unspecified pulmonary embolism type (HCC)        7. New onset type 2 diabetes mellitus (HCC)        8. Acquired hypothyroidism        9. Tobacco abuse        10. Anemia due to acute blood loss        11. S/P coronary artery stent placement        12. S/P ICD (internal cardiac defibrillator) procedure        13. History of sustained ventricular tachycardia        14. Hyperlipidemia, unspecified hyperlipidemia type        15. History of gastric ulcer                 Summary of my recommendation for the patient    Patient had ST elevation MI, monomorphic VT, had ICD placed, was started on amiodarone - Check for amiodarone toxicity at this time now    Follow up in 3 months -At that time we will plan to reduce or discontinue amiodarone;   Price sotalol 160 mg 2 times daily and dofetilide 500 mcg 2 times daily, as alternative medications if they are needed after discontinuing amiodarone    Does have heart failure and is on aggressive therapy as per Dr. Bell    Patient was on empagliflozin but developed a UTI and it had to be discontinued  Requesting evaluation by primary/endocrine if semaglutide can be used for this  patient    Patient to follow-up with me in 3 months time in November/December    If patient has recurrence of atrial flutter or fibrillation will need further ablation for the same     Complete cessation of smoking -patient has stopped smoking since her MI          Clinical conditions  ST elevation MI post PCI-LAD, post NATHAN of ostial proximal LAD  Ischemic cardiomyopathy with EF 35% in March 2024  Monomorphic ventricular tachycardia, dual chamber ICD  Reduced LVEF  Atrial flutter, possibility of PAF  Ischemic cardiomyopathy  Newly diagnosed diabetes mellitus  Hyperlipidemia  Tobacco use                Assessment & Plan     ST elevation MI post PCI-LAD, post NATHAN of ostial proximal LAD  Ischemic cardiomyopathy with EF 35% in March 2024  Reduced LVEF  Optimal medical therapy for heart failure -On spironolactone, Entresto, metoprolol, furosemide  For coronary artery disease patient is on metoprolol, beta-blocker and statin  Does have heart failure and is on aggressive therapy as per Dr. Bell  Patient was on empagliflozin but developed a UTI and it had to be discontinued  Requesting evaluation by primary/endocrine if semaglutide can be used for this patient          History of tobacco abuse  Complete cessation of smoking -patient has stopped smoking since her MI          Monomorphic ventricular tachycardia, dual chamber ICD  Patient had ST elevation MI, monomorphic VT, had ICD placed, was started on amiodarone - Check for amiodarone toxicity at this time now    Follow up in 3 months -At that time we will plan to reduce or discontinue amiodarone;   Price sotalol 160 mg 2 times daily and dofetilide 500 mcg 2 times daily, as alternative medications if they are needed after discontinuing amiodarone          Atrial flutter, possibility of PAF  Currently on amiodarone, metoprolol and apixaban  Check for amiodarone toxicity  If patient has recurrence of atrial flutter or fibrillation will need further ablation for the  "same      Newly diagnosed diabetes mellitus  Patient was originally started on empagliflozin but developed UTI  Consider use of semaglutide      Hypothyroidism  On levothyroxine  Follow-up with TSH      Hyperlipidemia  On atorvastatin  No myositis or myalgia          History of Present Illness   HPI: Vesna Langston is a 64 y.o. year old female has been referred to me by PCP for the evaluation and management of VT and A-fib      The patient has significant medical illnesses which include  ST elevation MI post PCI-LAD, post NATHAN of ostial proximal LAD  Ischemic cardiomyopathy with EF 35% in March 2024  Monomorphic ventricular tachycardia, dual chamber ICD  Reduced LVEF  Atrial flutter, possibility of PAF  Ischemic cardiomyopathy  Newly diagnosed diabetes mellitus  Hyperlipidemia  Tobacco use      Patient had a very complex medical course after her ST elevation MI  Complicated by VT and A-fib and was started on therapy for the same  Had an ICD placed  Thereafter had significant GI bleed and had to be admitted for the same-evaluated and treated for gastric ulcers  Patient also suffered from appendicitis which required an operation      She has given up smoking  She is slowly starting to feel better    Not complaining of anginal-like chest pain  Not complaining of orthopnea or PND  Leg swelling is improving  Not complaining of palpitations    Her exertional tolerance is improving  She recently went on a trip with her family and \"walked 200 steps    She is following up with her physicians, given up alcohol tobacco      Historical Information   Past Medical History:   Diagnosis Date    Acute respiratory insufficiency 03/22/2024    Allergic     Chronic HFrEF (heart failure with reduced ejection fraction) (HCC)     Coronary artery disease     COVID-19 virus infection 11/28/2022    Diabetes mellitus (HCC)     Disease of thyroid gland 4/09/2024    Hyperlipidemia     Hypoglycemia     ICD (implantable cardioverter-defibrillator) in " place     Ischemic cardiomyopathy     Lactic acidosis 2024    Myocardial infarction (HCC) 3/22/2024    Otitis media 1966    ST elevation myocardial infarction involving left anterior descending (LAD) coronary artery (HCC) 2024    Tobacco abuse     Visual impairment 1967     Past Surgical History:   Procedure Laterality Date    ADENOIDECTOMY      APPENDECTOMY      APPENDECTOMY LAPAROSCOPIC N/A 2024    Procedure: APPENDECTOMY LAPAROSCOPIC;  Surgeon: Lauryn Ullrich, DO;  Location: [facility];  Service: General    BREAST EXCISIONAL BIOPSY Right 2000    cyst removed- benign    CARDIAC CATHETERIZATION N/A 2024    Procedure: Cardiac pci;  Surgeon: Gabriel Myers MD;  [facility];  Service: Cardiology    CARDIAC CATHETERIZATION N/A 2024    Procedure: Cardiac Coronary Angiogram;  Surgeon: Gabriel Myers MD;  [facility];  Service: Cardiology    CARDIAC CATHETERIZATION N/A 2024    Procedure: Cardiac PCI AMI;  Surgeon: Gabriel Myers MD;  [facility];  Service: Cardiology    CARDIAC CATHETERIZATION N/A 2024    Procedure: Cardiac IVUS;  Surgeon: Gabriel Myers MD;  [facility];  Service: Cardiology    CARDIAC ELECTROPHYSIOLOGY PROCEDURE N/A 2024    Procedure: Cardiac icd implant;  Surgeon: Jeffy Lama MD;  [facility];  Service: Cardiology     SECTION      ECTOPIC PREGNANCY SURGERY      SEPTOPLASTY      SPINE SURGERY  23    TONSILLECTOMY       Social History     Substance and Sexual Activity   Alcohol Use Not Currently    Alcohol/week: 1.0 standard drink of alcohol    Types: 1 Shots of liquor per week    Comment: rarely     Social History     Substance and Sexual Activity   Drug Use Never     Social History     Tobacco Use   Smoking Status Former    Current packs/day: 0.00    Average packs/day: 1 pack/day for 24.2 years (24.2 ttl pk-yrs)    Types: Cigarettes    Start date: 2000     Quit date: 3/22/2024    Years since quittin.3   Smokeless Tobacco Never   Tobacco Comments    Smoked 0.5-1 ppd; quit in 2024.      Social History     Socioeconomic History    Marital status:      Spouse name: Not on file    Number of children: 3    Years of education: Not on file    Highest education level: Not on file   Occupational History    Not on file   Tobacco Use    Smoking status: Former     Current packs/day: 0.00     Average packs/day: 1 pack/day for 24.2 years (24.2 ttl pk-yrs)     Types: Cigarettes     Start date: 2000     Quit date: 3/22/2024     Years since quittin.3    Smokeless tobacco: Never    Tobacco comments:     Smoked 0.5-1 ppd; quit in 2024.    Vaping Use    Vaping status: Never Used   Substance and Sexual Activity    Alcohol use: Not Currently     Alcohol/week: 1.0 standard drink of alcohol     Types: 1 Shots of liquor per week     Comment: rarely    Drug use: Never    Sexual activity: Not Currently     Partners: Female     Birth control/protection: Post-menopausal   Other Topics Concern    Not on file   Social History Narrative    Not on file     Social Determinants of Health     Financial Resource Strain: Low Risk  (2023)    [facility]    Overall Financial Resource Strain (CARDIA)     Difficulty of Paying Living Expenses: Not hard at all   Food Insecurity: No Food Insecurity (2024)    Hunger Vital Sign     Worried About Running Out of Food in the Last Year: Never true     Ran Out of Food in the Last Year: Never true   Transportation Needs: No Transportation Needs (2024)    PRAPARE - Transportation     Lack of Transportation (Medical): No     Lack of Transportation (Non-Medical): No   Physical Activity: Not on file   Stress: No Stress Concern Present (2023)    [facility]    [employer] -  "Occupational Stress Questionnaire     Feeling of Stress : Not at all   Social Connections: Unknown (6/18/2024)    Received from [facility]     How often do you feel lonely or isolated from those around you? (Adult - for ages 18 years and over): Not on file   Intimate Partner Violence: Not At Risk (2/20/2023)    [facility] from [facility], [facility]    Humiliation, Afraid, Rape, and Kick questionnaire     Fear of Current or Ex-Partner: No     Emotionally Abused: No     Physically Abused: No     Sexually Abused: No   Housing Stability: High Risk (4/8/2024)    Housing Stability Vital Sign     Unable to Pay for Housing in the Last Year: No     Number of Times Moved in the Last Year: 2     Homeless in the Last Year: No     .  Family History:  Family History   Adopted: Yes   Problem Relation Age of Onset    Depression Mother     Heart disease Father     OCD Daughter          Meds/Allergies      No current facility-administered medications for this visit.        Not in a hospital admission.    Allergies   Allergen Reactions    Shellfish-Derived Products - Food Allergy Anaphylaxis    Azithromycin Rash           Objective   Vitals: Visit Vitals  /74 (BP Location: Left arm, Patient Position: Sitting, Cuff Size: Standard)   Pulse 60   Ht 5' 8\" (1.727 m)   Wt 95.5 kg (210 lb 9.6 oz)   SpO2 97%   BMI 32.02 kg/m²   OB Status Postmenopausal   Smoking Status Former   BSA 2.09 m²        Invasive Devices       None                     ROS  Review of Systems   All other systems reviewed and are negative.  As described in my history of present illness        PHYSICAL EXAM  Physical Exam  Vitals reviewed.   Constitutional:       General: She is not in acute distress.     Appearance: Normal appearance. She is obese. She is not ill-appearing.   HENT:      Head: Normocephalic and atraumatic.      Right Ear: External ear normal.      Left Ear: External ear normal.      Nose: Nose " normal.      Mouth/Throat:      Comments: Posterior pharynx is crowded  Eyes:      General: No scleral icterus.     Extraocular Movements: Extraocular movements intact.      Conjunctiva/sclera: Conjunctivae normal.      Pupils: Pupils are equal, round, and reactive to light.   Neck:      Comments: Large thick neck  Cardiovascular:      Rate and Rhythm: Normal rate and regular rhythm.      Pulses: Normal pulses.      Heart sounds: Normal heart sounds. No murmur heard.  Pulmonary:      Effort: Pulmonary effort is normal. No respiratory distress.      Breath sounds: Normal breath sounds. No wheezing.   Abdominal:      General: Bowel sounds are normal. There is no distension.      Tenderness: There is no abdominal tenderness.      Comments: Central obesity present   Musculoskeletal:         General: No swelling, tenderness or deformity.      Cervical back: No rigidity.   Skin:     Coloration: Skin is not jaundiced.      Findings: No bruising or lesion.   Neurological:      Mental Status: She is alert and oriented to person, place, and time. Mental status is at baseline.      Motor: No weakness.   Psychiatric:         Mood and Affect: Mood normal.         Behavior: Behavior normal.         Thought Content: Thought content normal.         Judgment: Judgment normal.               LAB RESULTS:    CBC:  WBC   Date Value Ref Range Status   07/31/2024 9.11 4.31 - 10.16 Thousand/uL Final     Hemoglobin   Date Value Ref Range Status   07/31/2024 12.4 11.5 - 15.4 g/dL Final     Hematocrit   Date Value Ref Range Status   07/31/2024 40.4 34.8 - 46.1 % Final     MCV   Date Value Ref Range Status   07/31/2024 82 82 - 98 fL Final     Platelets   Date Value Ref Range Status   07/31/2024 325 149 - 390 Thousands/uL Final     RBC   Date Value Ref Range Status   07/31/2024 4.91 3.81 - 5.12 Million/uL Final     MCH   Date Value Ref Range Status   07/31/2024 25.3 (L) 26.8 - 34.3 pg Final     MCHC   Date Value Ref Range Status   07/31/2024  30.7 (L) 31.4 - 37.4 g/dL Final     RDW   Date Value Ref Range Status   07/31/2024 16.3 (H) 11.6 - 15.1 % Final     MPV   Date Value Ref Range Status   07/31/2024 9.7 8.9 - 12.7 fL Final     nRBC   Date Value Ref Range Status   05/12/2024 0 /100 WBCs Final       CMP:  Potassium   Date Value Ref Range Status   08/08/2024 3.9 3.5 - 5.3 mmol/L Final   08/02/2022 4.1 3.5 - 5.1 mmol/L Final     Chloride   Date Value Ref Range Status   08/08/2024 103 96 - 108 mmol/L Final   08/02/2022 107 98 - 107 mmol/L Final     Carbon Dioxide   Date Value Ref Range Status   08/02/2022 26 22 - 28 mmol/L Final     CO2   Date Value Ref Range Status   08/08/2024 26 21 - 32 mmol/L Final     CO2, i-STAT   Date Value Ref Range Status   04/07/2024 32 21 - 32 mmol/L Final     BUN   Date Value Ref Range Status   08/08/2024 19 5 - 25 mg/dL Final   08/02/2022 17 7 - 18 mg/dL Final     Creatinine   Date Value Ref Range Status   08/08/2024 0.93 0.60 - 1.30 mg/dL Final     Comment:     Standardized to IDMS reference method   08/02/2022 0.70 0.60 - 1.30 mg/dL Final     Glucose, i-STAT   Date Value Ref Range Status   04/07/2024 168 (H) 65 - 140 mg/dl Final     Calcium   Date Value Ref Range Status   08/08/2024 9.2 8.4 - 10.2 mg/dL Final   08/02/2022 8.9 8.4 - 10.2 mg/dL Final     AST   Date Value Ref Range Status   08/08/2024 19 13 - 39 U/L Final   07/31/2022 55 (H) 10 - 42 U/L Final     ALT   Date Value Ref Range Status   08/08/2024 20 7 - 52 U/L Final     Comment:     Specimen collection should occur prior to Sulfasalazine administration due to the potential for falsely depressed results.    07/31/2022 57 (H) 10 - 40 U/L Final     Alkaline Phosphatase   Date Value Ref Range Status   08/08/2024 47 34 - 104 U/L Final   07/31/2022 37 32 - 91 U/L Final     eGFRcr   Date Value Ref Range Status   08/02/2022 97 >59 Final     eGFR   Date Value Ref Range Status   08/08/2024 64 ml/min/1.73sq m Final        Magnesium:   Magnesium   Date Value Ref Range Status    05/11/2024 2.0 1.9 - 2.7 mg/dL Final        A1C:  Hemoglobin A1C   Date Value Ref Range Status   06/25/2024 6.7 (H) Normal 4.0-5.6%; PreDiabetic 5.7-6.4%; Diabetic >=6.5%; Glycemic control for adults with diabetes <7.0% % Final        TSH:  TSH 3RD GENERATON   Date Value Ref Range Status   08/08/2024 7.815 (H) 0.450 - 4.500 uIU/mL Final     Comment:     The recommended reference ranges for TSH during pregnancy are as follows:   First trimester 0.100 to 2.500 uIU/mL   Second trimester  0.200 to 3.000 uIU/mL   Third trimester 0.300 to 3.000 uIU/m    Note: Normal ranges may not apply to patients who are transgender, non-binary, or whose legal sex, sex at birth, and gender identity differ.  Adult TSH (3rd generation) reference range follows the recommended guidelines of the American Thyroid Association, January, 2020.        PT/INR:  Protime   Date Value Ref Range Status   05/07/2024 14.6 (H) 11.6 - 14.5 seconds Final     INR   Date Value Ref Range Status   05/07/2024 1.08 0.84 - 1.19 Final   07/31/2022 1.0  Final     Comment:     Interpretation  Suggested INR ranges for various  clinical conditions:                                           INR  Ambulatory Surgery          <1.5  Coumadinized Patients             (DVT,PE,MI or A.Fib)     2.0-3.0  Mechanical Heart Valve   2.5-3.5  Cardiogenic Embolus      2.5-3.5       Lipid Panel:  Cholesterol   Date Value Ref Range Status   04/22/2024 142 See Comment mg/dL Final     Comment:     Cholesterol:         Pediatric <18 Years        Desirable          <170 mg/dL      Borderline High    170-199 mg/dL      High               >=200 mg/dL        Adult >=18 Years            Desirable        <200 mg/dL      Borderline High  200-239 mg/dL      High             >239 mg/dL       Triglycerides   Date Value Ref Range Status   04/22/2024 137 See Comment mg/dL Final     Comment:     Triglyceride:     0-9Y            <75mg/dL     10Y-17Y         <90 mg/dL       >=18Y     Normal           "<150 mg/dL     Borderline High 150-199 mg/dL     High            200-499 mg/dL        Very High       >499 mg/dL    Specimen collection should occur prior to Metamizole administration due to the potential for falsely depressed results.     HDL, Direct   Date Value Ref Range Status   2024 45 (L) >=50 mg/dL Final     Non-HDL-Chol (CHOL-HDL)   Date Value Ref Range Status   2024 97 mg/dl Final       Troponin:  No results found for: \"TROPONINI\"      Imaging:    [id number] - Atrial fibrillation      2024 - Normal sinus rhythm         ESTHER:  No results found for this or any previous visit.      Echocardiogram:  Results for orders placed during the hospital encounter of 24    Echo complete w/ contrast if indicated    Interpretation Summary    Left Ventricle: Left ventricular cavity size is dilated. Wall thickness is normal. The left ventricular ejection fraction is 35%. Systolic function is moderately reduced.    The following segments are akinetic: mid anterior, mid anteroseptal, apical anterior, apical inferior, apical lateral and apex.    The following segments are hypokinetic: apical septal.    The following segments are hyperkinetic: basal inferior, basal inferolateral and mid inferior.    All other segments are normal.    Left Atrium: The atrium is moderately dilated (42-48 mL/m2).    Mitral Valve: There is moderate regurgitation.    Tricuspid Valve: There is mild regurgitation. The right ventricular systolic pressure is moderately elevated. The estimated right ventricular systolic pressure is 51.00 mmHg.    Moderately reduced LVEF.  Akinetic LAD territory with evidence of thinning/scarring in most of these regions.    Results for orders placed during the hospital encounter of 24    Echo follow up/limited w/ contrast if indicated    Interpretation Summary    Left Ventricle: Left ventricular cavity size is normal. The left ventricular ejection fraction is 30%. Systolic function is " severely reduced.    The following segments are akinetic: apical anterior, apical septal and apex.    The following segments are hypokinetic: mid anteroseptal, mid inferoseptal, apical inferior and apical lateral.    All other segments are normal.    Right Ventricle: Systolic function is normal. Wall motion is normal.    Right Atrium: The atrium is normal in size.  Mobile density could represent thrombus versus Chiari network    Tricuspid Valve: There is mild regurgitation. The estimated right ventricular systolic pressure is 25.00 mmHg.      Stress Test:   No results found for this or any previous visit.      Cardiac Catheterization:    Cardiac pci, Cardiac Coronary Angiogram, Cardiac PCI AMI, Cardiac IVUS  03/22/2024    Diagnostic  Dominance: Left    Left Main   The vessel is large and is angiographically normal.      Left Anterior Descending   The vessel is small. There is severe diffuse disease throughout the vessel.   Ost LAD to Prox LAD lesion is 100% stenosed. Culprit lesion. MANNY flow is 0. The lesion is type C. The lesion is chronically occluded. Ultrasound (IVUS) was performed on the Ost LAD to Prox LAD.      Ramus Intermedius   The vessel is large and is angiographically normal.      Left Circumflex   The vessel is large, is angiographically normal and dominant.      Right Coronary Artery   The vessel is small, is angiographically normal and non-dominant.      Intervention        Ost LAD to Prox LAD lesion   Angioplasty   The balloon used was a [employer] PTCA RX TREK 2.5 X 15MM. Maximum pressure: 8 emily. Inflation time: 8 sec. Time obtained: 20:22 EDT. First reinflation; Max pressure: 8 emily. Inflation time 8 sec. Time obtained: 20:22 EDT. Second reinflation; Max pressure: 8 emily. Inflation time 5 sec. Time obtained: 20:22 EDT. Third reinflation; Max pressure: 8 emily. Inflation time 5 sec. Time obtained: 20:22 EDT. Fourth reinflation; Max pressure: 8 emily. Inflation time 7 sec. Time obtained: 20:23 EDT. Fifth  reinflation; Max pressure: 8 emily. Inflation time 5 sec. Time obtained: 20:23 EDT. Sixth reinflation; Max pressure: 12 emily. Inflation time 10 sec. Time obtained: 20:24 EDT.   Supplies used: GUIDEWIRE  50 300CM; CATH GUIDE LAUNCHER 6FR EBU 3.5; CATH BAL PTCA RX TREK 2.5 X 15MM   Angioplasty   The balloon used was a CATH BAL PTCA RX TREK 3 X 20MM. Maximum pressure: 4 emily. Inflation time: 5 sec. Time obtained: 20:26 EDT. First reinflation; Max pressure: 14 emily. Inflation time 10 sec. Time obtained: 20:27 EDT. Second reinflation; Max pressure: 12 emily. Inflation time 6 sec. Time obtained: 20:27 EDT. Third reinflation; Max pressure: 12 emily. Inflation time 8 sec. Time obtained: 20:27 EDT.   Supplies used: [employer] PTCA RX TREK 3 X 20MM   Stent   Stent was successfully placed. The stent used was a STENT NATHAN SYNERGY XD MR US [id number]. Stent was deployed by way of balloon expansion. Maximum pressure: 18 emily. Inflation time: 30 sec.   Supplies used: STENT NATHAN SYNERGY XD MR US [id number]   Angioplasty   The balloon used was a CATH BAL TREK NC AMBER 3 X 15MM RAPD EXCH. Maximum pressure: 16 emily. Inflation time: 30 sec. Time obtained: 20:52 EDT.   Supplies used: CATH BAL TREK NC AMBER 3 X 15MM RAPD EXCH   Post-Intervention Lesion Assessment   Post-intervention MANNY flow is 3. Ultrasound (IVUS) was performed. The strut appears well apposed.   There is a 0% residual stenosis post intervention.          HOLTER MONITOR: 24 HOUR/48 HOUR MONITORS  No results found for this or any previous visit.      AMB Extended Holter Monitor: Zio XT/AT or BioTel  No results found for this or any previous visit.      Cardiac MRI:    MRI Cardiac w wo Contrast  03/26/2024    Findings:  1. Mildly moderately dilated left ventricle with severely reduced systolic function, calculated EF 20%. Thinning and hypo- to akinesis of the mid anterior, anteroseptal and inferoseptal walls, the apical anterior, septal and inferior walls and the apex.  2. Normal right  ventricular size and systolic function.  3. The aortic, mitral, and tricuspid valves open without restriction. Mitral regurgitation visualized.  4. The left atrium is mildly dilated. The aortic root is normal in size.  5. There is no evidence of myocardial edema.  6. Delayed post-gadolinium imaging demonstrates transmural late gadolinium enhancement of the mid anterior, anteroseptal and inferoseptal walls, the apical anterior septal and inferior walls and the apex.     IMPRESSION:  Impression:  1. Mildly to moderately dilated left ventricle with severely reduced systolic function, calculated EF 20%. Thinning and hypo- to akinesis of the mid anterior, anteroseptal and inferoseptal walls, the apical anterior, septal and inferior walls and the   apex.  2. Normal right ventricular size and systolic function.  3. Mildly dilated left atrium. Mitral regurgitation visualized.  4. Transmural scarring of the mid anterior, anteroseptal and inferoseptal walls, the apical anterior, septal and inferior walls and the apex.        DEVICE CHECK:     Results for orders placed or performed in visit on 08/01/24   Cardiac EP device report    Narrative    MDT DUAL ICD (MVP ON) / ACTIVE SYSTEM IS MRI CONDITIONAL  DEVICE INTERROGATED IN THE [place] OFFICE. BATTERY VOLTAGE ADEQUATE(12.4 YRS). AP 21%  <0.1% ALL LEAD PARAMETERS WITHIN NORMAL LIMITS. NO NEW SIGNIFICANT HIGH RATE EPISODES. OPTI-VOL WITHIN NORMAL LIMITS. NO PROGRAMMING CHANGES MADE TO DEVICE PARAMETERS. NORMAL DEVICE FUNCTION. AM              Code Status: [unfilled]  Advance Directive and Living Will:      Power of :    POLST:      Counseling / Coordination of Care  Detailed discussion done with regards to management of antiarrhythmic  Will need reevaluation in about 3 months      Jeffy Lama MD

## 2024-08-08 ENCOUNTER — APPOINTMENT (OUTPATIENT)
Dept: LAB | Facility: CLINIC | Age: 64
End: 2024-08-08
Payer: COMMERCIAL

## 2024-08-08 ENCOUNTER — OFFICE VISIT (OUTPATIENT)
Dept: CARDIOLOGY CLINIC | Facility: CLINIC | Age: 64
End: 2024-08-08
Payer: COMMERCIAL

## 2024-08-08 VITALS
SYSTOLIC BLOOD PRESSURE: 132 MMHG | BODY MASS INDEX: 31.92 KG/M2 | DIASTOLIC BLOOD PRESSURE: 74 MMHG | HEIGHT: 68 IN | WEIGHT: 210.6 LBS | HEART RATE: 60 BPM | OXYGEN SATURATION: 97 %

## 2024-08-08 DIAGNOSIS — I48.92 ATRIAL FLUTTER, PAROXYSMAL (HCC): Chronic | ICD-10-CM

## 2024-08-08 DIAGNOSIS — Z79.899 ON AMIODARONE THERAPY: ICD-10-CM

## 2024-08-08 DIAGNOSIS — Z86.79 HISTORY OF SUSTAINED VENTRICULAR TACHYCARDIA: Chronic | ICD-10-CM

## 2024-08-08 DIAGNOSIS — E78.5 HYPERLIPIDEMIA, UNSPECIFIED HYPERLIPIDEMIA TYPE: ICD-10-CM

## 2024-08-08 DIAGNOSIS — Z72.0 TOBACCO ABUSE: ICD-10-CM

## 2024-08-08 DIAGNOSIS — E03.9 ACQUIRED HYPOTHYROIDISM: ICD-10-CM

## 2024-08-08 DIAGNOSIS — I50.20 HFREF (HEART FAILURE WITH REDUCED EJECTION FRACTION) (HCC): ICD-10-CM

## 2024-08-08 DIAGNOSIS — I50.20 HFREF (HEART FAILURE WITH REDUCED EJECTION FRACTION) (HCC): Primary | ICD-10-CM

## 2024-08-08 DIAGNOSIS — Z95.810 S/P ICD (INTERNAL CARDIAC DEFIBRILLATOR) PROCEDURE: Chronic | ICD-10-CM

## 2024-08-08 DIAGNOSIS — I25.10 CORONARY ARTERY DISEASE INVOLVING NATIVE CORONARY ARTERY OF NATIVE HEART WITHOUT ANGINA PECTORIS: Chronic | ICD-10-CM

## 2024-08-08 DIAGNOSIS — E11.9 NEW ONSET TYPE 2 DIABETES MELLITUS (HCC): ICD-10-CM

## 2024-08-08 DIAGNOSIS — I25.5 ISCHEMIC CARDIOMYOPATHY: Chronic | ICD-10-CM

## 2024-08-08 DIAGNOSIS — Z95.5 S/P CORONARY ARTERY STENT PLACEMENT: Chronic | ICD-10-CM

## 2024-08-08 DIAGNOSIS — D62 ANEMIA DUE TO ACUTE BLOOD LOSS: ICD-10-CM

## 2024-08-08 DIAGNOSIS — Z87.11 HISTORY OF GASTRIC ULCER: ICD-10-CM

## 2024-08-08 DIAGNOSIS — I26.99 ACUTE PULMONARY EMBOLISM WITHOUT ACUTE COR PULMONALE, UNSPECIFIED PULMONARY EMBOLISM TYPE (HCC): ICD-10-CM

## 2024-08-08 LAB
ALBUMIN SERPL BCG-MCNC: 4.4 G/DL (ref 3.5–5)
ALP SERPL-CCNC: 47 U/L (ref 34–104)
ALT SERPL W P-5'-P-CCNC: 20 U/L (ref 7–52)
ANION GAP SERPL CALCULATED.3IONS-SCNC: 9 MMOL/L (ref 4–13)
AST SERPL W P-5'-P-CCNC: 19 U/L (ref 13–39)
BILIRUB SERPL-MCNC: 0.4 MG/DL (ref 0.2–1)
BUN SERPL-MCNC: 19 MG/DL (ref 5–25)
CALCIUM SERPL-MCNC: 9.2 MG/DL (ref 8.4–10.2)
CHLORIDE SERPL-SCNC: 103 MMOL/L (ref 96–108)
CO2 SERPL-SCNC: 26 MMOL/L (ref 21–32)
CREAT SERPL-MCNC: 0.93 MG/DL (ref 0.6–1.3)
GFR SERPL CREATININE-BSD FRML MDRD: 64 ML/MIN/1.73SQ M
GLUCOSE SERPL-MCNC: 110 MG/DL (ref 65–140)
POTASSIUM SERPL-SCNC: 3.9 MMOL/L (ref 3.5–5.3)
PROT SERPL-MCNC: 7.5 G/DL (ref 6.4–8.4)
SODIUM SERPL-SCNC: 138 MMOL/L (ref 135–147)
T4 FREE SERPL-MCNC: 0.74 NG/DL (ref 0.61–1.12)
TSH SERPL DL<=0.05 MIU/L-ACNC: 7.82 UIU/ML (ref 0.45–4.5)

## 2024-08-08 PROCEDURE — 36415 COLL VENOUS BLD VENIPUNCTURE: CPT

## 2024-08-08 PROCEDURE — 84443 ASSAY THYROID STIM HORMONE: CPT

## 2024-08-08 PROCEDURE — 99214 OFFICE O/P EST MOD 30 MIN: CPT | Performed by: INTERNAL MEDICINE

## 2024-08-08 PROCEDURE — 84439 ASSAY OF FREE THYROXINE: CPT

## 2024-08-08 PROCEDURE — 80053 COMPREHEN METABOLIC PANEL: CPT

## 2024-08-08 PROCEDURE — 93000 ELECTROCARDIOGRAM COMPLETE: CPT | Performed by: INTERNAL MEDICINE

## 2024-08-08 NOTE — LETTER
August 11, 2024     Brandon Pete MD  [address] [place] [id number]    Patient: Vesna Langston   YOB: 1960   Date of Visit: 8/8/2024       Dear Dr. Pete:    Thank you for referring Vesna Langston to me for evaluation. Below are my notes for this consultation.    If you have questions, please do not hesitate to call me. I look forward to following your patient along with you.         Sincerely,        Jeffy Lama MD        CC: VesnaMD Jeffy Baez MD  8/11/2024  6:49 AM  Sign when Signing Visit  Progress Note - Electrophysiology-Cardiology (EP)   Vesna Langston 64 y.o. female MRN: [id number]  Unit/Bed#:  Encounter: [id number]           1. HFrEF (heart failure with reduced ejection fraction) (HCC)  POCT ECG    Comprehensive metabolic panel      2. On amiodarone therapy  Spirometry with diffusing capacity    TSH, 3rd generation    T4, free    Comprehensive metabolic panel    Ambulatory Referral to Ophthalmology      3. Atrial flutter, paroxysmal (HCC)  Spirometry with diffusing capacity    TSH, 3rd generation    T4, free    Comprehensive metabolic panel    Ambulatory Referral to Ophthalmology      4. Ischemic cardiomyopathy        5. Coronary artery disease involving native coronary artery of native heart without angina pectoris        6. Acute pulmonary embolism without acute cor pulmonale, unspecified pulmonary embolism type (HCC)        7. New onset type 2 diabetes mellitus (HCC)        8. Acquired hypothyroidism        9. Tobacco abuse        10. Anemia due to acute blood loss        11. S/P coronary artery stent placement        12. S/P ICD (internal cardiac defibrillator) procedure        13. History of sustained ventricular tachycardia        14. Hyperlipidemia, unspecified hyperlipidemia type        15. History of gastric ulcer                 Summary of my recommendation for the patient    Patient had ST elevation MI, monomorphic VT, had ICD placed, was started  on amiodarone - Check for amiodarone toxicity at this time now    Follow up in 3 months -At that time we will plan to reduce or discontinue amiodarone;   Price sotalol 160 mg 2 times daily and dofetilide 500 mcg 2 times daily, as alternative medications if they are needed after discontinuing amiodarone    Does have heart failure and is on aggressive therapy as per Dr. Bell    Patient was on empagliflozin but developed a UTI and it had to be discontinued  Requesting evaluation by primary/endocrine if semaglutide can be used for this patient    Patient to follow-up with me in 3 months time in November/December    If patient has recurrence of atrial flutter or fibrillation will need further ablation for the same     Complete cessation of smoking -patient has stopped smoking since her MI          Clinical conditions  ST elevation MI post PCI-LAD, post NATHAN of ostial proximal LAD  Ischemic cardiomyopathy with EF 35% in March 2024  Monomorphic ventricular tachycardia, dual chamber ICD  Reduced LVEF  Atrial flutter, possibility of PAF  Ischemic cardiomyopathy  Newly diagnosed diabetes mellitus  Hyperlipidemia  Tobacco use                Assessment & Plan    ST elevation MI post PCI-LAD, post NATHAN of ostial proximal LAD  Ischemic cardiomyopathy with EF 35% in March 2024  Reduced LVEF  Optimal medical therapy for heart failure -On spironolactone, Entresto, metoprolol, furosemide  For coronary artery disease patient is on metoprolol, beta-blocker and statin  Does have heart failure and is on aggressive therapy as per Dr. Bell  Patient was on empagliflozin but developed a UTI and it had to be discontinued  Requesting evaluation by primary/endocrine if semaglutide can be used for this patient          History of tobacco abuse  Complete cessation of smoking -patient has stopped smoking since her MI          Monomorphic ventricular tachycardia, dual chamber ICD  Patient had ST elevation MI, monomorphic VT, had ICD placed, was  started on amiodarone - Check for amiodarone toxicity at this time now    Follow up in 3 months -At that time we will plan to reduce or discontinue amiodarone;   Price sotalol 160 mg 2 times daily and dofetilide 500 mcg 2 times daily, as alternative medications if they are needed after discontinuing amiodarone          Atrial flutter, possibility of PAF  Currently on amiodarone, metoprolol and apixaban  Check for amiodarone toxicity  If patient has recurrence of atrial flutter or fibrillation will need further ablation for the same      Newly diagnosed diabetes mellitus  Patient was originally started on empagliflozin but developed UTI  Consider use of semaglutide      Hypothyroidism  On levothyroxine  Follow-up with TSH      Hyperlipidemia  On atorvastatin  No myositis or myalgia          History of Present Illness  HPI: Vesna Langston is a 64 y.o. year old female has been referred to me by PCP for the evaluation and management of VT and A-fib      The patient has significant medical illnesses which include  ST elevation MI post PCI-LAD, post NATHAN of ostial proximal LAD  Ischemic cardiomyopathy with EF 35% in March 2024  Monomorphic ventricular tachycardia, dual chamber ICD  Reduced LVEF  Atrial flutter, possibility of PAF  Ischemic cardiomyopathy  Newly diagnosed diabetes mellitus  Hyperlipidemia  Tobacco use      Patient had a very complex medical course after her ST elevation MI  Complicated by VT and A-fib and was started on therapy for the same  Had an ICD placed  Thereafter had significant GI bleed and had to be admitted for the same-evaluated and treated for gastric ulcers  Patient also suffered from appendicitis which required an operation      She has given up smoking  She is slowly starting to feel better    Not complaining of anginal-like chest pain  Not complaining of orthopnea or PND  Leg swelling is improving  Not complaining of palpitations    Her exertional tolerance is improving  She recently went on a  "trip with her family and \"walked 200 steps    She is following up with her physicians, given up alcohol tobacco      Historical Information  Past Medical History:   Diagnosis Date   • Acute respiratory insufficiency 03/22/2024   • Allergic    • Chronic HFrEF (heart failure with reduced ejection fraction) ([facility])    • Coronary artery disease    • COVID-19 virus infection 11/28/2022   • Diabetes mellitus ([facility])    • Disease of thyroid gland 4/09/2024   • Hyperlipidemia    • Hypoglycemia    • ICD (implantable cardioverter-defibrillator) in place    • Ischemic cardiomyopathy    • Lactic acidosis 03/22/2024   • Myocardial infarction ([facility]) 3/22/2024   • Otitis media 1966   • ST elevation myocardial infarction involving left anterior descending (LAD) coronary artery ([facility]) 03/22/2024   • Tobacco abuse    • Visual impairment 1967     Past Surgical History:   Procedure Laterality Date   • ADENOIDECTOMY     • APPENDECTOMY     • APPENDECTOMY LAPAROSCOPIC N/A 05/08/2024    Procedure: APPENDECTOMY LAPAROSCOPIC;  Surgeon: Lauryn Ullrich, DO;  Location: [facility];  Service: General   • BREAST EXCISIONAL BIOPSY Right 09/2000    cyst removed- benign   • CARDIAC CATHETERIZATION N/A 03/22/2024    Procedure: Cardiac pci;  Surgeon: Gabriel Myers MD;  [facility];  Service: Cardiology   • CARDIAC CATHETERIZATION N/A 03/22/2024    Procedure: Cardiac Coronary Angiogram;  Surgeon: Gabriel Myers MD;  [facility];  Service: Cardiology   • CARDIAC CATHETERIZATION N/A 03/22/2024    Procedure: Cardiac PCI AMI;  Surgeon: Gabriel Myers MD;  [facility];  Service: Cardiology   • CARDIAC CATHETERIZATION N/A 03/22/2024    Procedure: Cardiac IVUS;  Surgeon: Gabriel Myers MD;  [facility];  Service: Cardiology   • CARDIAC ELECTROPHYSIOLOGY PROCEDURE N/A 03/27/2024    Procedure: Cardiac icd implant;  Surgeon: Jeffy Lama MD;  [facility];  Service: Cardiology   • "  SECTION     • ECTOPIC PREGNANCY SURGERY     • SEPTOPLASTY     • SPINE SURGERY  23   • TONSILLECTOMY       Social History     Substance and Sexual Activity   Alcohol Use Not Currently   • Alcohol/week: 1.0 standard drink of alcohol   • Types: 1 Shots of liquor per week    Comment: rarely     Social History     Substance and Sexual Activity   Drug Use Never     Social History     Tobacco Use   Smoking Status Former   • Current packs/day: 0.00   • Average packs/day: 1 pack/day for 24.2 years (24.2 ttl pk-yrs)   • Types: Cigarettes   • Start date: 2000   • Quit date: 3/22/2024   • Years since quittin.3   Smokeless Tobacco Never   Tobacco Comments    Smoked 0.5-1 ppd; quit in 2024.      Social History     Socioeconomic History   • Marital status:      Spouse name: Not on file   • Number of children: 3   • Years of education: Not on file   • Highest education level: Not on file   Occupational History   • Not on file   Tobacco Use   • Smoking status: Former     Current packs/day: 0.00     Average packs/day: 1 pack/day for 24.2 years (24.2 ttl pk-yrs)     Types: Cigarettes     Start date: 2000     Quit date: 3/22/2024     Years since quittin.3   • Smokeless tobacco: Never   • Tobacco comments:     Smoked 0.5-1 ppd; quit in 2024.    Vaping Use   • Vaping status: Never Used   Substance and Sexual Activity   • Alcohol use: Not Currently     Alcohol/week: 1.0 standard drink of alcohol     Types: 1 Shots of liquor per week     Comment: rarely   • Drug use: Never   • Sexual activity: Not Currently     Partners: Female     Birth control/protection: Post-menopausal   Other Topics Concern   • Not on file   Social History Narrative   • Not on file     Social Determinants of Health     Financial Resource Strain: Low Risk  (2023)    [facility]    Overall Financial Resource Strain (CARDIA)    • Difficulty of Paying Living  "Expenses: Not hard at all   Food Insecurity: No Food Insecurity (4/8/2024)    Hunger Vital Sign    • Worried About Running Out of Food in the Last Year: Never true    • Ran Out of Food in the Last Year: Never true   Transportation Needs: No Transportation Needs (4/8/2024)    PRAPARE - Transportation    • Lack of Transportation (Medical): No    • Lack of Transportation (Non-Medical): No   Physical Activity: Not on file   Stress: No Stress Concern Present (2/20/2023)    Received from [facility], [facility] - Occupational Stress Questionnaire    • Feeling of Stress : Not at all   Social Connections: Unknown (6/18/2024)    Received from [employer]    Social [employer]    • How often do you feel lonely or isolated from those around you? (Adult - for ages 18 years and over): Not on file   Intimate Partner Violence: Not At Risk (2/20/2023)    Received from [facility], [facility]    Humiliation, Afraid, Rape, and Kick questionnaire    • Fear of Current or Ex-Partner: No    • Emotionally Abused: No    • Physically Abused: No    • Sexually Abused: No   Housing Stability: High Risk (4/8/2024)    Housing Stability Vital Sign    • Unable to Pay for Housing in the Last Year: No    • Number of Times Moved in the Last Year: 2    • Homeless in the Last Year: No     .  Family History:  Family History   Adopted: Yes   Problem Relation Age of Onset   • Depression Mother    • Heart disease Father    • OCD Daughter          Meds/Allergies     No current facility-administered medications for this visit.        Not in a hospital admission.    Allergies   Allergen Reactions   • Shellfish-Derived Products - Food Allergy Anaphylaxis   • Azithromycin Rash           Objective  Vitals: Visit Vitals  /74 (BP Location: Left arm, Patient Position: Sitting, Cuff Size: Standard)   Pulse 60   Ht 5' 8\" (1.727 m)   Wt 95.5 kg (210 lb " 9.6 oz)   SpO2 97%   BMI 32.02 kg/m²   OB Status Postmenopausal   Smoking Status Former   BSA 2.09 m²        Invasive Devices       None                     ROS  Review of Systems   All other systems reviewed and are negative.  As described in my history of present illness        PHYSICAL EXAM  Physical Exam  Vitals reviewed.   Constitutional:       General: She is not in acute distress.     Appearance: Normal appearance. She is obese. She is not ill-appearing.   HENT:      Head: Normocephalic and atraumatic.      Right Ear: External ear normal.      Left Ear: External ear normal.      Nose: Nose normal.      Mouth/Throat:      Comments: Posterior pharynx is crowded  Eyes:      General: No scleral icterus.     Extraocular Movements: Extraocular movements intact.      Conjunctiva/sclera: Conjunctivae normal.      Pupils: Pupils are equal, round, and reactive to light.   Neck:      Comments: Large thick neck  Cardiovascular:      Rate and Rhythm: Normal rate and regular rhythm.      Pulses: Normal pulses.      Heart sounds: Normal heart sounds. No murmur heard.  Pulmonary:      Effort: Pulmonary effort is normal. No respiratory distress.      Breath sounds: Normal breath sounds. No wheezing.   Abdominal:      General: Bowel sounds are normal. There is no distension.      Tenderness: There is no abdominal tenderness.      Comments: Central obesity present   Musculoskeletal:         General: No swelling, tenderness or deformity.      Cervical back: No rigidity.   Skin:     Coloration: Skin is not jaundiced.      Findings: No bruising or lesion.   Neurological:      Mental Status: She is alert and oriented to person, place, and time. Mental status is at baseline.      Motor: No weakness.   Psychiatric:         Mood and Affect: Mood normal.         Behavior: Behavior normal.         Thought Content: Thought content normal.         Judgment: Judgment normal.               LAB RESULTS:    CBC:  WBC   Date Value Ref Range  Status   07/31/2024 9.11 4.31 - 10.16 Thousand/uL Final     Hemoglobin   Date Value Ref Range Status   07/31/2024 12.4 11.5 - 15.4 g/dL Final     Hematocrit   Date Value Ref Range Status   07/31/2024 40.4 34.8 - 46.1 % Final     MCV   Date Value Ref Range Status   07/31/2024 82 82 - 98 fL Final     Platelets   Date Value Ref Range Status   07/31/2024 325 149 - 390 Thousands/uL Final     RBC   Date Value Ref Range Status   07/31/2024 4.91 3.81 - 5.12 Million/uL Final     MCH   Date Value Ref Range Status   07/31/2024 25.3 (L) 26.8 - 34.3 pg Final     MCHC   Date Value Ref Range Status   07/31/2024 30.7 (L) 31.4 - 37.4 g/dL Final     RDW   Date Value Ref Range Status   07/31/2024 16.3 (H) 11.6 - 15.1 % Final     MPV   Date Value Ref Range Status   07/31/2024 9.7 8.9 - 12.7 fL Final     nRBC   Date Value Ref Range Status   05/12/2024 0 /100 WBCs Final       CMP:  Potassium   Date Value Ref Range Status   08/08/2024 3.9 3.5 - 5.3 mmol/L Final   08/02/2022 4.1 3.5 - 5.1 mmol/L Final     Chloride   Date Value Ref Range Status   08/08/2024 103 96 - 108 mmol/L Final   08/02/2022 107 98 - 107 mmol/L Final     Carbon Dioxide   Date Value Ref Range Status   08/02/2022 26 22 - 28 mmol/L Final     CO2   Date Value Ref Range Status   08/08/2024 26 21 - 32 mmol/L Final     CO2, i-STAT   Date Value Ref Range Status   04/07/2024 32 21 - 32 mmol/L Final     BUN   Date Value Ref Range Status   08/08/2024 19 5 - 25 mg/dL Final   08/02/2022 17 7 - 18 mg/dL Final     Creatinine   Date Value Ref Range Status   08/08/2024 0.93 0.60 - 1.30 mg/dL Final     Comment:     Standardized to IDMS reference method   08/02/2022 0.70 0.60 - 1.30 mg/dL Final     Glucose, i-STAT   Date Value Ref Range Status   04/07/2024 168 (H) 65 - 140 mg/dl Final     Calcium   Date Value Ref Range Status   08/08/2024 9.2 8.4 - 10.2 mg/dL Final   08/02/2022 8.9 8.4 - 10.2 mg/dL Final     AST   Date Value Ref Range Status   08/08/2024 19 13 - 39 U/L Final   07/31/2022  55 (H) 10 - 42 U/L Final     ALT   Date Value Ref Range Status   08/08/2024 20 7 - 52 U/L Final     Comment:     Specimen collection should occur prior to Sulfasalazine administration due to the potential for falsely depressed results.    07/31/2022 57 (H) 10 - 40 U/L Final     Alkaline Phosphatase   Date Value Ref Range Status   08/08/2024 47 34 - 104 U/L Final   07/31/2022 37 32 - 91 U/L Final     eGFRcr   Date Value Ref Range Status   08/02/2022 97 >59 Final     eGFR   Date Value Ref Range Status   08/08/2024 64 ml/min/1.73sq m Final        Magnesium:   Magnesium   Date Value Ref Range Status   05/11/2024 2.0 1.9 - 2.7 mg/dL Final        A1C:  Hemoglobin A1C   Date Value Ref Range Status   06/25/2024 6.7 (H) Normal 4.0-5.6%; PreDiabetic 5.7-6.4%; Diabetic >=6.5%; Glycemic control for adults with diabetes <7.0% % Final        TSH:  TSH 3RD GENERATON   Date Value Ref Range Status   08/08/2024 7.815 (H) 0.450 - 4.500 uIU/mL Final     Comment:     The recommended reference ranges for TSH during pregnancy are as follows:   First trimester 0.100 to 2.500 uIU/mL   Second trimester  0.200 to 3.000 uIU/mL   Third trimester 0.300 to 3.000 uIU/m    Note: Normal ranges may not apply to patients who are transgender, non-binary, or whose legal sex, sex at birth, and gender identity differ.  Adult TSH (3rd generation) reference range follows the recommended guidelines of the American Thyroid Association, January, 2020.        PT/INR:  Protime   Date Value Ref Range Status   05/07/2024 14.6 (H) 11.6 - 14.5 seconds Final     INR   Date Value Ref Range Status   05/07/2024 1.08 0.84 - 1.19 Final   07/31/2022 1.0  Final     Comment:     Interpretation  Suggested INR ranges for various  clinical conditions:                                           INR  Ambulatory Surgery          <1.5  Coumadinized Patients             (DVT,PE,MI or A.Fib)     2.0-3.0  Mechanical Heart Valve   2.5-3.5  Cardiogenic Embolus      2.5-3.5       Lipid  "Panel:  Cholesterol   Date Value Ref Range Status   2024 142 See Comment mg/dL Final     Comment:     Cholesterol:         Pediatric <18 Years        Desirable          <170 mg/dL      Borderline High    170-199 mg/dL      High               >=200 mg/dL        Adult >=18 Years            Desirable        <200 mg/dL      Borderline High  200-239 mg/dL      High             >239 mg/dL       Triglycerides   Date Value Ref Range Status   2024 137 See Comment mg/dL Final     Comment:     Triglyceride:     0-9Y            <75mg/dL     10Y-17Y         <90 mg/dL       >=18Y     Normal          <150 mg/dL     Borderline High 150-199 mg/dL     High            200-499 mg/dL        Very High       >499 mg/dL    Specimen collection should occur prior to Metamizole administration due to the potential for falsely depressed results.     HDL, Direct   Date Value Ref Range Status   2024 45 (L) >=50 mg/dL Final     Non-HDL-Chol (CHOL-HDL)   Date Value Ref Range Status   2024 97 mg/dl Final       Troponin:  No results found for: \"TROPONINI\"      Imaging:    [id number] - Atrial fibrillation      2024 - Normal sinus rhythm         ESTHER:  No results found for this or any previous visit.      Echocardiogram:  Results for orders placed during the hospital encounter of 24    Echo complete w/ contrast if indicated    Interpretation Summary  •  Left Ventricle: Left ventricular cavity size is dilated. Wall thickness is normal. The left ventricular ejection fraction is 35%. Systolic function is moderately reduced.  •  The following segments are akinetic: mid anterior, mid anteroseptal, apical anterior, apical inferior, apical lateral and apex.  •  The following segments are hypokinetic: apical septal.  •  The following segments are hyperkinetic: basal inferior, basal inferolateral and mid inferior.  •  All other segments are normal.  •  Left Atrium: The atrium is moderately dilated (42-48 mL/m2).  •  " Mitral Valve: There is moderate regurgitation.  •  Tricuspid Valve: There is mild regurgitation. The right ventricular systolic pressure is moderately elevated. The estimated right ventricular systolic pressure is 51.00 mmHg.    Moderately reduced LVEF.  Akinetic LAD territory with evidence of thinning/scarring in most of these regions.    Results for orders placed during the hospital encounter of 05/07/24    Echo follow up/limited w/ contrast if indicated    Interpretation Summary  •  Left Ventricle: Left ventricular cavity size is normal. The left ventricular ejection fraction is 30%. Systolic function is severely reduced.  •  The following segments are akinetic: apical anterior, apical septal and apex.  •  The following segments are hypokinetic: mid anteroseptal, mid inferoseptal, apical inferior and apical lateral.  •  All other segments are normal.  •  Right Ventricle: Systolic function is normal. Wall motion is normal.  •  Right Atrium: The atrium is normal in size.  Mobile density could represent thrombus versus Chiari network  •  Tricuspid Valve: There is mild regurgitation. The estimated right ventricular systolic pressure is 25.00 mmHg.      Stress Test:   No results found for this or any previous visit.      Cardiac Catheterization:    Cardiac pci, Cardiac Coronary Angiogram, Cardiac PCI AMI, Cardiac IVUS  03/22/2024    Diagnostic  Dominance: Left    Left Main   The vessel is large and is angiographically normal.      Left Anterior Descending   The vessel is small. There is severe diffuse disease throughout the vessel.   Ost LAD to Prox LAD lesion is 100% stenosed. Culprit lesion. MANNY flow is 0. The lesion is type C. The lesion is chronically occluded. Ultrasound (IVUS) was performed on the Ost LAD to Prox LAD.      Ramus Intermedius   The vessel is large and is angiographically normal.      Left Circumflex   The vessel is large, is angiographically normal and dominant.      Right Coronary Artery   The  vessel is small, is angiographically normal and non-dominant.      Intervention        Ost LAD to Prox LAD lesion   Angioplasty   The balloon used was a CATH [employer] PTCA RX TREK 2.5 X 15MM. Maximum pressure: 8 emily. Inflation time: 8 sec. Time obtained: 20:22 EDT. First reinflation; Max pressure: 8 emily. Inflation time 8 sec. Time obtained: 20:22 EDT. Second reinflation; Max pressure: 8 emily. Inflation time 5 sec. Time obtained: 20:22 EDT. Third reinflation; Max pressure: 8 emily. Inflation time 5 sec. Time obtained: 20:22 EDT. Fourth reinflation; Max pressure: 8 emily. Inflation time 7 sec. Time obtained: 20:23 EDT. Fifth reinflation; Max pressure: 8 emily. Inflation time 5 sec. Time obtained: 20:23 EDT. Sixth reinflation; Max pressure: 12 emily. Inflation time 10 sec. Time obtained: 20:24 EDT.   Supplies used: GUIDEWIRE  50 300CM; CATH GUIDE LAUNCHER 6FR EBU 3.5; [employer] BAL PTCA RX TREK 2.5 X 15MM   Angioplasty   The balloon used was a [employer] PTCA RX TREK 3 X 20MM. Maximum pressure: 4 emily. Inflation time: 5 sec. Time obtained: 20:26 EDT. First reinflation; Max pressure: 14 emily. Inflation time 10 sec. Time obtained: 20:27 EDT. Second reinflation; Max pressure: 12 emily. Inflation time 6 sec. Time obtained: 20:27 EDT. Third reinflation; Max pressure: 12 emily. Inflation time 8 sec. Time obtained: 20:27 EDT.   Supplies used: [employer] PTCA RX TREK 3 X 20MM   Stent   Stent was successfully placed. The stent used was a STENT NATHAN SYNERGY XD MR US [id number]. Stent was deployed by way of balloon expansion. Maximum pressure: 18 emily. Inflation time: 30 sec.   Supplies used: STENT NATHAN SYNERGY XD MR US [id number]   Angioplasty   The balloon used was a CATH BAL TREK NC AMBER 3 X 15MM RAPD EXCH. Maximum pressure: 16 emily. Inflation time: 30 sec. Time obtained: 20:52 EDT.   Supplies used: CATH BAL TREK NC AMBER 3 X 15MM RAPD EXCH   Post-Intervention Lesion Assessment   Post-intervention MANNY flow is 3. Ultrasound (IVUS) was performed. The strut  appears well apposed.   There is a 0% residual stenosis post intervention.          HOLTER MONITOR: 24 HOUR/48 HOUR MONITORS  No results found for this or any previous visit.      AMB Extended Holter Monitor: Zio XT/AT or BioTel  No results found for this or any previous visit.      Cardiac MRI:    MRI Cardiac w wo Contrast  03/26/2024    Findings:  1. Mildly moderately dilated left ventricle with severely reduced systolic function, calculated EF 20%. Thinning and hypo- to akinesis of the mid anterior, anteroseptal and inferoseptal walls, the apical anterior, septal and inferior walls and the apex.  2. Normal right ventricular size and systolic function.  3. The aortic, mitral, and tricuspid valves open without restriction. Mitral regurgitation visualized.  4. The left atrium is mildly dilated. The aortic root is normal in size.  5. There is no evidence of myocardial edema.  6. Delayed post-gadolinium imaging demonstrates transmural late gadolinium enhancement of the mid anterior, anteroseptal and inferoseptal walls, the apical anterior septal and inferior walls and the apex.     IMPRESSION:  Impression:  1. Mildly to moderately dilated left ventricle with severely reduced systolic function, calculated EF 20%. Thinning and hypo- to akinesis of the mid anterior, anteroseptal and inferoseptal walls, the apical anterior, septal and inferior walls and the   apex.  2. Normal right ventricular size and systolic function.  3. Mildly dilated left atrium. Mitral regurgitation visualized.  4. Transmural scarring of the mid anterior, anteroseptal and inferoseptal walls, the apical anterior, septal and inferior walls and the apex.        DEVICE CHECK:     Results for orders placed or performed in visit on 08/01/24   Cardiac EP device report    Narrative    MDT DUAL ICD (MVP ON) / ACTIVE SYSTEM IS MRI CONDITIONAL  DEVICE INTERROGATED IN THE [place] OFFICE. BATTERY VOLTAGE ADEQUATE(12.4 YRS). AP 21%  <0.1% ALL LEAD PARAMETERS  WITHIN NORMAL LIMITS. NO NEW SIGNIFICANT HIGH RATE EPISODES. OPTI-VOL WITHIN NORMAL LIMITS. NO PROGRAMMING CHANGES MADE TO DEVICE PARAMETERS. NORMAL DEVICE FUNCTION. AM              Code Status: [unfilled]  Advance Directive and Living Will:      Power of :    POLST:      Counseling / Coordination of Care  Detailed discussion done with regards to management of antiarrhythmic  Will need reevaluation in about 3 months      Jeffy Lama MD

## 2024-08-08 NOTE — PATIENT INSTRUCTIONS
- Complete Amiodarone toxicity screening which includes:    - Get labs drawn (TSH, T4 & CMP)   - Schedule pulmonary function test     - Call [phone] for appointment    - Schedule appointment with eye doctor    - Our surgery schedulers will price Dofetilide and Sotalol for med admission   - If you have any questions call [phone], option # 2

## 2024-08-09 ENCOUNTER — TELEPHONE (OUTPATIENT)
Dept: GASTROENTEROLOGY | Facility: CLINIC | Age: 64
End: 2024-08-09

## 2024-08-09 ENCOUNTER — TELEPHONE (OUTPATIENT)
Dept: CARDIOLOGY CLINIC | Facility: CLINIC | Age: 64
End: 2024-08-09

## 2024-08-09 ENCOUNTER — APPOINTMENT (OUTPATIENT)
Dept: CARDIAC REHAB | Facility: CLINIC | Age: 64
End: 2024-08-09
Payer: COMMERCIAL

## 2024-08-09 ENCOUNTER — OFFICE VISIT (OUTPATIENT)
Dept: GASTROENTEROLOGY | Facility: CLINIC | Age: 64
End: 2024-08-09
Payer: COMMERCIAL

## 2024-08-09 VITALS
BODY MASS INDEX: 31.58 KG/M2 | WEIGHT: 208.4 LBS | DIASTOLIC BLOOD PRESSURE: 56 MMHG | HEIGHT: 68 IN | SYSTOLIC BLOOD PRESSURE: 110 MMHG | TEMPERATURE: 97.9 F

## 2024-08-09 DIAGNOSIS — K25.9 MULTIPLE GASTRIC ULCERS: Primary | ICD-10-CM

## 2024-08-09 DIAGNOSIS — Z79.01 CHRONIC ANTICOAGULATION: ICD-10-CM

## 2024-08-09 PROCEDURE — 99214 OFFICE O/P EST MOD 30 MIN: CPT | Performed by: PHYSICIAN ASSISTANT

## 2024-08-09 NOTE — PATIENT INSTRUCTIONS
Scheduled date of EGD(as of today): 11/07/2024  Physician performing EGD: Dr. Ragsdale  Location of EGD: [facility]  Instructions reviewed with patient by: Tracy GERARD   Clearances:  Eliquis and Plavix

## 2024-08-09 NOTE — PROGRESS NOTES
[facility] - Outpatient Follow-up Note  Vesna aLngston 64 y.o. female MRN: [id number]  Encounter: [id number]  ASSESSMENT AND PLAN:    1. Multiple gastric ulcers  2. Chronic anticoagulation  Patient with a history of bleeding gastric ulcers and bleeding EGJ junction ulcer.  She is currently feeling well without complaints or alarming symptoms.  Her hemoglobin has improved.  She does not have any signs of bleeding.  She underwent emergency appendectomy in May.    I had a long discussion with the patient today.  At this time I think it is reasonable that she stop Carafate tablets  I request patient continue with pantoprazole 40 mg before breakfast and before dinner for now  She will continue to avoid NSAIDs  We had a long discussion regarding her cardiac health.  She has an ICD in place and is currently on both Plavix and Eliquis.  We discussed EGD.  We discussed that normally we repeat EGDs a few months after diagnosing gastric ulcers to ensure healing of ulcers.  Patient expressed understanding to this.  Patient states she would like to undergo repeat EGD to ensure healing of ulcers.  We discussed that she is high risk for EGD from a cardiac standpoint.  She understands this.      Will plan to schedule EGD in the hospital setting with cardiac clearance to hold both Eliquis and Plavix prior to procedure.  Will plan to schedule this for sometime late October/early November per patient request.  If unable to hold both anticoagulation medications, perhaps we would be able to complete EGD only on Eliquis.  Will await input from cardiology team      - EGD; Future      The risk/benefits/alternatives of the procedure/s were discussed with the patient.  Risks included, but not limited to, infection, bleeding, perforation, injury to organs in the abdomen, missed lesion and incomplete procedure were discussed.  Patient expressed understanding and agreeable to proceed with procedure/s.    Patient was  instructed to call the office with any questions, concerns, new/ worsening/ persisting GI symptoms. Advised patient go to the ER with any severe or worsening abdominal pain, fevers/ chills, intractable N/V, chest pain, SOB, dizziness, lightheadedness, feeling something stuck in esophagus that will not go down. Patient expressed understanding and is in agreement with treatment plan.     Will plan to follow up after diagnostic tests   __________________________________________________________    SUBJECTIVE:      Patient with a past medical history of a flutter on Eliquis, ischemic cardiomyopathy withheart failure with reduced ejection fraction with ICD in place (placed in 3/2024), coronary artery disease with recent cardiac stent placement 3/2024 on Plavix hypothyroidism, type 2 diabetes mellitus, anxiety, history of tobacco abuse, hyperlipidemia presents to the office today for follow-up.  Patient was last seen in the office by me 5/3/2024, previous office note was reviewed.  At last office visit I recommended she continue with pantoprazole 40 mg twice daily before meals as well as sucralfate/Carafate tablets.  I also recommended she indefinitely avoid NSAIDs and obtain repeat CBC in a few months time.  CBC done recently showed improvement of hemoglobin from 9-12.4.    Patient reports feeling better overall. She notes since last visit she had an emergency appendectomy.   Patient tells me she is gaining weight. Has good appetite. Eating and drinking well.   She is taking pantoprazole 40 mg BID before meals and carafate tablets before meals.   Bms are regular, formed.   She is so pleased.  Patient denies any current or recent fevers/ chills, abdominal pain, nausea, vomiting, change in bowel habits, diarrhea, constipation, black or bloody stools, heartburn, dysphagia, odynophagia.   Denies chest pain, SOB, dizziness    She is avoiding NSAIDs    She is interested in decreasing pill burden at this time related to her  ulcers.  She states she would also like to undergo EGD as previously discussed to make sure the ulcers have all healed.    Review of Systems   Constitutional:  Negative for chills and fever.   HENT:  Negative for ear pain and sore throat.    Eyes:  Negative for pain and visual disturbance.   Respiratory:  Negative for cough and shortness of breath.    Cardiovascular:  Negative for chest pain and palpitations.   Gastrointestinal:  Negative for constipation, diarrhea, nausea and vomiting.   Genitourinary:  Negative for dysuria, frequency and hematuria.   Musculoskeletal:  Negative for arthralgias, back pain and myalgias.   Skin:  Negative for color change and rash.   Neurological:  Negative for seizures, syncope and headaches.   All other systems reviewed and are negative.         Historical Information   Past Medical History:   Diagnosis Date    Acute respiratory insufficiency 03/22/2024    Allergic     Chronic HFrEF (heart failure with reduced ejection fraction) ([facility])     Coronary artery disease     COVID-19 virus infection 11/28/2022    Diabetes mellitus ([facility])     Disease of thyroid gland 4/09/2024    Hyperlipidemia     Hypoglycemia     ICD (implantable cardioverter-defibrillator) in place     Ischemic cardiomyopathy     Lactic acidosis 03/22/2024    Myocardial infarction ([facility]) 3/22/2024    Otitis media 1966    ST elevation myocardial infarction involving left anterior descending (LAD) coronary artery ([facility]) 03/22/2024    Tobacco abuse     Visual impairment 1967     Past Surgical History:   Procedure Laterality Date    ADENOIDECTOMY      APPENDECTOMY      APPENDECTOMY LAPAROSCOPIC N/A 05/08/2024    Procedure: APPENDECTOMY LAPAROSCOPIC;  Surgeon: Lauryn Ullrich, DO;  Location: [facility];  Service: General    BREAST EXCISIONAL BIOPSY Right 09/2000    cyst removed- benign    CARDIAC CATHETERIZATION N/A 03/22/2024    Procedure: Cardiac pci;  Surgeon: Gabriel Myers MD;  [facility]: [facility];  Service: Cardiology     CARDIAC CATHETERIZATION N/A 2024    Procedure: Cardiac Coronary Angiogram;  Surgeon: Gabriel Myers MD;  [facility];  Service: Cardiology    CARDIAC CATHETERIZATION N/A 2024    Procedure: Cardiac PCI AMI;  Surgeon: Garbiel Myers MD;  [facility];  Service: Cardiology    CARDIAC CATHETERIZATION N/A 2024    Procedure: Cardiac IVUS;  Surgeon: Gabriel Myers MD;  [facility];  Service: Cardiology    CARDIAC ELECTROPHYSIOLOGY PROCEDURE N/A 2024    Procedure: Cardiac icd implant;  Surgeon: Jeffy Lama MD;  [facility];  Service: Cardiology     SECTION      ECTOPIC PREGNANCY SURGERY      SEPTOPLASTY      SPINE SURGERY  23    TONSILLECTOMY       Social History   Social History     Substance and Sexual Activity   Alcohol Use Not Currently    Alcohol/week: 1.0 standard drink of alcohol    Types: 1 Shots of liquor per week    Comment: rarely     Social History     Substance and Sexual Activity   Drug Use Never     Social History     Tobacco Use   Smoking Status Former    Current packs/day: 0.00    Average packs/day: 1 pack/day for 24.2 years (24.2 ttl pk-yrs)    Types: Cigarettes    Start date: 2000    Quit date: 3/22/2024    Years since quittin.3   Smokeless Tobacco Never   Tobacco Comments    Smoked 0.5-1 ppd; quit in 2024.      Family History   Adopted: Yes   Problem Relation Age of Onset    Depression Mother     Heart disease Father     OCD Daughter        Meds/Allergies       Current Outpatient Medications:     albuterol (ProAir HFA) 90 mcg/act inhaler    amiodarone 200 mg tablet    apixaban (ELIQUIS) 5 mg    atorvastatin (LIPITOR) 80 mg tablet    clopidogrel (PLAVIX) 75 mg tablet    escitalopram (LEXAPRO) 10 mg tablet    furosemide (LASIX) 20 mg tablet    gabapentin (Neurontin) 100 mg capsule    HYDROcodone-acetaminophen (NORCO) 5-325 mg per tablet    levothyroxine (Synthroid) 50 mcg tablet    meclizine  (ANTIVERT) 25 mg tablet    metoprolol succinate (TOPROL-XL) 25 mg 24 hr tablet    Multiple Vitamin (MULTIVITAMIN) capsule    pantoprazole (PROTONIX) 40 mg tablet    sacubitril-valsartan (Entresto) 24-26 MG TABS    spironolactone (ALDACTONE) 25 mg tablet    sucralfate (CARAFATE) 1 g tablet    Allergies   Allergen Reactions    Shellfish-Derived Products - Food Allergy Anaphylaxis    Azithromycin Rash           Objective     Wt Readings from Last 3 Encounters:   08/09/24 94.5 kg (208 lb 6.4 oz)   08/08/24 95.5 kg (210 lb 9.6 oz)   07/26/24 95.3 kg (210 lb)     Temp Readings from Last 3 Encounters:   08/09/24 97.9 °F (36.6 °C) (Tympanic)   07/26/24 (!) 97.1 °F (36.2 °C)   07/02/24 97.8 °F (36.6 °C)     BP Readings from Last 3 Encounters:   08/09/24 110/56   08/08/24 132/74   07/26/24 140/78     Pulse Readings from Last 3 Encounters:   08/08/24 60   07/26/24 79   07/08/24 69          PHYSICAL EXAM:      Physical Exam  Vitals reviewed.   Constitutional:       General: She is not in acute distress.     Appearance: She is not toxic-appearing.   HENT:      Head: Normocephalic and atraumatic.   Eyes:      Extraocular Movements: Extraocular movements intact.      Conjunctiva/sclera: Conjunctivae normal.   Cardiovascular:      Rate and Rhythm: Normal rate and regular rhythm.   Pulmonary:      Effort: Pulmonary effort is normal. No respiratory distress.      Breath sounds: Normal breath sounds.   Abdominal:      General: Bowel sounds are normal.      Palpations: Abdomen is soft.      Tenderness: There is no abdominal tenderness.   Musculoskeletal:         General: No swelling or tenderness.      Cervical back: Normal range of motion and neck supple.   Skin:     General: Skin is warm and dry.      Coloration: Skin is not jaundiced.   Neurological:      General: No focal deficit present.      Mental Status: She is alert and oriented to person, place, and time. Mental status is at baseline.   Psychiatric:         Mood and Affect:  Mood normal.         Behavior: Behavior normal.         Thought Content: Thought content normal.          Lab Results:   No visits with results within 1 Day(s) from this visit.   Latest known visit with results is:   Appointment on 08/08/2024   Component Date Value    TSH 3RD GENERATON 08/08/2024 7.815 (H)     Free T4 08/08/2024 0.74     Sodium 08/08/2024 138     Potassium 08/08/2024 3.9     Chloride 08/08/2024 103     CO2 08/08/2024 26     ANION GAP 08/08/2024 9     BUN 08/08/2024 19     Creatinine 08/08/2024 0.93     Glucose 08/08/2024 110     Calcium 08/08/2024 9.2     AST 08/08/2024 19     ALT 08/08/2024 20     Alkaline Phosphatase 08/08/2024 47     Total Protein 08/08/2024 7.5     Albumin 08/08/2024 4.4     Total Bilirubin 08/08/2024 0.40     eGFR 08/08/2024 64        Lab Results   Component Value Date    WBC 9.11 07/31/2024    HGB 12.4 07/31/2024    HCT 40.4 07/31/2024    MCV 82 07/31/2024     07/31/2024       Lab Results   Component Value Date    SODIUM 138 08/08/2024    K 3.9 08/08/2024     08/08/2024    CO2 26 08/08/2024    AGAP 9 08/08/2024    BUN 19 08/08/2024    CREATININE 0.93 08/08/2024    GLUC 110 08/08/2024    GLUF 122 (H) 06/13/2024    CALCIUM 9.2 08/08/2024    AST 19 08/08/2024    ALT 20 08/08/2024    ALKPHOS 47 08/08/2024    TP 7.5 08/08/2024    TBILI 0.40 08/08/2024    EGFR 64 08/08/2024     Radiology Results:   EGD     Result Date: 4/7/2024  Narrative: Table formatting from the original result was not included. [facility] Operating Room [address] [facility] [place] [id number] [phone] DATE OF SERVICE: 4/07/24 PHYSICIAN(S): Attending: Jessa Ragsdale MD Fellow: No Staff Documented INDICATION: Upper GI bleed, Coffee ground emesis POST-OP DIAGNOSIS: See the impression below. PREPROCEDURE: Informed consent was obtained for the procedure, including sedation.  Risks of perforation, hemorrhage, adverse drug reaction and aspiration were discussed. The patient was  placed in the left lateral decubitus position. Patient was explained about the risks and benefits of the procedure. Risks including but not limited to bleeding, infection, and perforation were explained in detail. Also explained about less than 100% sensitivity with the exam and other alternatives. PROCEDURE: EGD DETAILS OF PROCEDURE: Patient was taken to the procedure room where a time out was performed to confirm correct patient and correct procedure. The patient underwent monitored anesthesia care, which was administered by an anesthesia professional. The patient's blood pressure, heart rate, level of consciousness, respirations, oxygen, ECG and ETCO2 were monitored throughout the procedure. The scope was introduced through the mouth and advanced to the second part of the duodenum. Retroflexion was performed in the fundus. The patient experienced no blood loss. The procedure was not difficult. The patient tolerated the procedure well. There were no apparent adverse events. ANESTHESIA INFORMATION: ASA: IV Anesthesia Type: IV Sedation with Anesthesia MEDICATIONS: No administrations occurring from 1217 to 1250 on 04/07/24 FINDINGS: Esophagus-fresher linear deep erosion was noted in the distal esophagus at the GE junction Stomach-fresh horizontal white-based ulcer noted in the antrum under an inflamed fold.  Another small fresh ulceration was noted in the adjacent area.  No evidence of any active bleeding. The duodenum appeared normal. SPECIMENS: * No specimens in log *      Impression: Esophagus-fresher linear deep erosion was noted in the distal esophagus at the GE junction Stomach-fresh horizontal white-based ulcer noted in the antrum under an inflamed fold.  Another small fresh ulceration was noted in the adjacent area.  No evidence of any active bleeding. The duodenum appeared normal. RECOMMENDATION:  Protonix drip, add Carafate, avoid NSAIDs.  Check stool for H. pylori antigen.  Continue Plavix because of  recent stent placement.  Since patient is at increased risks requiring anticoagulation therapy we can consider using IV heparin for a couple of days before putting her back on Eliquis and watch for any recurrent bleeding    Jessa Ragsdale MD      CT high volume bleeding scan abdomen pelvis     Result Date: 4/7/2024  Narrative: CT ABDOMEN AND PELVIS - WITHOUT AND WITH IV CONTRAST INDICATION:   acute GIB. COMPARISON: 4/1/2024 TECHNIQUE: CT examination of the abdomen and pelvis was performed both prior to and after the administration of intravenous contrast. Multiplanar 2D reformatted images were created from the source data. Radiation dose length product (DLP) for this visit: 2908 mGy-cm . This examination, like all CT scans performed in the [facility], was performed utilizing techniques to minimize radiation dose exposure, including the use of iterative reconstruction and automated exposure control. IV Contrast: 100 mL of iohexol (OMNIPAQUE) Enteric Contrast: Not administered. FINDINGS: ABDOMEN BOWEL: Asymmetric focal thickening of the anterior wall of the distal stomach, up to 2 cm. It is approximately 4-5 cm proximal to the pylorus. At the thickened wall, faint blush on image 51 series 303 arterial phase, with distribution of radiodense material (up to 81 HU) in the lumen on delayed images. Precontrast intraluminal density 33 HU. This is highly suspicious for a gastric bleeding due to underlying lesion or PUD. Recommend endoscopic correlation. No other foci suspicious for acute GI bleed. Diverticulosis coli. No evidence of diverticulitis, diverticular bleed, AVM. Normal GI tract caliber. No evidence of obstruction or acute inflammatory changes. LOWER CHEST: No clinically significant abnormality in the visualized lower chest. Pacemaker electrodes in the right cardiac chambers. LIVER/BILIARY TREE: Unremarkable. GALLBLADDER: Cholelithiasis without findings of acute cholecystitis. SPLEEN: Calcified  granulomata. No suspicious mass. PANCREAS: Unremarkable. ADRENAL GLANDS: Unremarkable. KIDNEYS/URETERS: Simple renal cyst(s). Otherwise unremarkable kidneys. No hydronephrosis. APPENDIX: No findings to suggest appendicitis. ABDOMINOPELVIC CAVITY: No ascites. No pneumoperitoneum. No lymphadenopathy. VESSELS: Atherosclerotic PELVIS REPRODUCTIVE ORGANS: Unremarkable for patient's age. URINARY BLADDER: Unremarkable. ABDOMINAL WALL/INGUINAL REGIONS: Unremarkable. BONES: No acute fracture or suspicious osseous lesion. L3-L5 laminectomies and L3-S1 posterior spinal fixation.      Impression: Suspected small hemorrhage from the anterior wall of the distal gastric body with focal wall thickening. Bleeding due to underlying lesion or PUD is suspected. Recommend endoscopic correlation. No other acute findings in the GI tract. Diverticulosis coli. No diverticulitis. Normal appendix. Cholelithiasis in the otherwise unremarkable gallbladder. Other chronic findings, as per the body of the report. I personally discussed this study with ABBIE KLEIN on 4/7/2024 11:54 AM. Dr. Ragsdale and GI team was also notified soon after. Workstation performed: [id number]     Kassi High PA-C

## 2024-08-09 NOTE — TELEPHONE ENCOUNTER
Our mutual patient is scheduled for procedure: EGD    On: __11__/_  07  _/_ 24   _      With: Dr. Martinez_Jn_______    He/She is taking the following blood thinner:   Eliquis (2 of 2)    Can this be stopped ___2___ days prior to the procedure?      Physician Approving clearance: ________________________

## 2024-08-09 NOTE — TELEPHONE ENCOUNTER
Called [facility] @ [phone]  for med pricing.   Spoke to DONATO MCKINNON.       ID #: [id number]     Per insurance this is only an estimate.      Dofetilide 500 mcg (0.5mg), #180 -    NO AUTH REQUIRED  [place]:  $ 12 (retail pharmacy)   $  4 (mail order pharmacy)     Sotalol 120 mg, #      NO AUTH REQUIRED   [facility]:   $ 12 (retail pharmacy)   $ 4 (mail order pharmacy)    Call reference #: [id number]     8/9/2024      Thanks,  Sofya Snyder

## 2024-08-12 ENCOUNTER — CLINICAL SUPPORT (OUTPATIENT)
Dept: CARDIAC REHAB | Facility: CLINIC | Age: 64
End: 2024-08-12
Payer: COMMERCIAL

## 2024-08-12 DIAGNOSIS — I50.22 CHRONIC SYSTOLIC HEART FAILURE (HCC): Primary | ICD-10-CM

## 2024-08-12 PROCEDURE — 93798 PHYS/QHP OP CAR RHAB W/ECG: CPT

## 2024-08-12 NOTE — TELEPHONE ENCOUNTER
Spoke to pt. Notified of Dr. Laam's message --    Jeffy Lama MD       Make sure that this patient is checked for amiodarone toxicity    In about 3 months we plan to discontinue amiodarone and start on  dofetilide     Pt already had labs drawn. She is scheduled for PFT on 8/21/24 @ [facility]. She is aware of ophthalmology appt. She's going to see if her doctor can do the Amiodarone toxicity screening and if not she will schedule w/ [facility].

## 2024-08-14 ENCOUNTER — NEW PATIENT COMPREHENSIVE (OUTPATIENT)
Dept: URBAN - METROPOLITAN AREA CLINIC 6 | Facility: CLINIC | Age: 64
End: 2024-08-14

## 2024-08-14 ENCOUNTER — CLINICAL SUPPORT (OUTPATIENT)
Dept: CARDIAC REHAB | Facility: CLINIC | Age: 64
End: 2024-08-14
Payer: COMMERCIAL

## 2024-08-14 DIAGNOSIS — H43.812: ICD-10-CM

## 2024-08-14 DIAGNOSIS — I50.22 CHRONIC SYSTOLIC HEART FAILURE (HCC): Primary | ICD-10-CM

## 2024-08-14 DIAGNOSIS — Z79.899: ICD-10-CM

## 2024-08-14 PROCEDURE — 92133 CPTRZD OPH DX IMG PST SGM ON: CPT | Mod: NC

## 2024-08-14 PROCEDURE — 92004 COMPRE OPH EXAM NEW PT 1/>: CPT

## 2024-08-14 PROCEDURE — 93798 PHYS/QHP OP CAR RHAB W/ECG: CPT

## 2024-08-14 ASSESSMENT — VISUAL ACUITY
OS_CC: 20/40-1
OU_CC: J1+
OD_CC: 20/30-2

## 2024-08-14 ASSESSMENT — TONOMETRY
OD_IOP_MMHG: 14
OS_IOP_MMHG: 13

## 2024-08-15 ENCOUNTER — OFFICE VISIT (OUTPATIENT)
Dept: FAMILY MEDICINE CLINIC | Facility: CLINIC | Age: 64
End: 2024-08-15
Payer: COMMERCIAL

## 2024-08-15 VITALS
SYSTOLIC BLOOD PRESSURE: 110 MMHG | BODY MASS INDEX: 32.28 KG/M2 | WEIGHT: 213 LBS | DIASTOLIC BLOOD PRESSURE: 60 MMHG | HEIGHT: 68 IN

## 2024-08-15 DIAGNOSIS — R55 SYNCOPE AND COLLAPSE: ICD-10-CM

## 2024-08-15 DIAGNOSIS — E11.9 NEW ONSET TYPE 2 DIABETES MELLITUS (HCC): ICD-10-CM

## 2024-08-15 DIAGNOSIS — E11.9 TYPE 2 DIABETES MELLITUS WITHOUT COMPLICATION, WITHOUT LONG-TERM CURRENT USE OF INSULIN (HCC): Primary | ICD-10-CM

## 2024-08-15 DIAGNOSIS — Z78.0 ASYMPTOMATIC POSTMENOPAUSAL STATE: ICD-10-CM

## 2024-08-15 DIAGNOSIS — I25.10 CORONARY ARTERY DISEASE INVOLVING NATIVE CORONARY ARTERY OF NATIVE HEART WITHOUT ANGINA PECTORIS: Chronic | ICD-10-CM

## 2024-08-15 DIAGNOSIS — E03.9 ACQUIRED HYPOTHYROIDISM: ICD-10-CM

## 2024-08-15 DIAGNOSIS — M67.442 GANGLION CYST OF FINGER OF LEFT HAND: ICD-10-CM

## 2024-08-15 DIAGNOSIS — E78.5 HYPERLIPIDEMIA, UNSPECIFIED HYPERLIPIDEMIA TYPE: ICD-10-CM

## 2024-08-15 DIAGNOSIS — I48.92 ATRIAL FLUTTER, PAROXYSMAL (HCC): Chronic | ICD-10-CM

## 2024-08-15 PROCEDURE — 99214 OFFICE O/P EST MOD 30 MIN: CPT | Performed by: FAMILY MEDICINE

## 2024-08-15 RX ORDER — LEVOTHYROXINE SODIUM 75 UG/1
75 TABLET ORAL DAILY
Qty: 90 TABLET | Refills: 3 | Status: SHIPPED | OUTPATIENT
Start: 2024-08-15

## 2024-08-15 NOTE — ASSESSMENT & PLAN NOTE
Has 1 on the finger and 1 on the palm.  They are not tender today.  Try just to keep pressure off the areas

## 2024-08-15 NOTE — ASSESSMENT & PLAN NOTE
He is doing cardiac rehab and following up with cardiology.  Continue to push activity as tolerated

## 2024-08-15 NOTE — ASSESSMENT & PLAN NOTE
Doing much better.  Continue to watch the starch in his diet and push daily physical activity.  Continue current medication  Lab Results   Component Value Date    HGBA1C 6.7 (H) 06/25/2024

## 2024-08-15 NOTE — PATIENT INSTRUCTIONS
Overall exam today looks good.  The TSH was elevated and thyroid levels low and will increase the thyroid supplement to 75 mcg.  Is going to break the 50 mcg tablets in half and take 1-1/2 to start.  Continue to push the daily physical activity.  Overall diabetic control looks good and A1c is down to 6.7.  Continue to watch the starch in the diet.  Continue current meds and follow-up

## 2024-08-15 NOTE — PROGRESS NOTES
Ambulatory Visit  Name: Vesna Langston      : 1960      MRN: [id number]  Encounter Provider: Brandon Pete MD  Encounter Date: 8/15/2024   Encounter department: [facility] PRIMARY CARE    Assessment & Plan   1. Type 2 diabetes mellitus without complication, without long-term current use of insulin (HCC)  Assessment & Plan:  Doing much better.  Continue to watch the starch in his diet and push daily physical activity.  Continue current medication  Lab Results   Component Value Date    HGBA1C 6.7 (H) 2024     2. New onset type 2 diabetes mellitus (HCC)  -     Albumin / creatinine urine ratio; Future  3. Asymptomatic postmenopausal state  -     DXA bone density spine hip and pelvis; Future; Expected date: 08/15/2024  4. Syncope and collapse  -     levothyroxine (Synthroid) 75 mcg tablet; Take 1 tablet (75 mcg total) by mouth daily  5. Coronary artery disease involving native coronary artery of native heart without angina pectoris  Assessment & Plan:  He is doing cardiac rehab and following up with cardiology.  Continue to push activity as tolerated  6. Atrial flutter, paroxysmal (HCC)  Assessment & Plan:  Seems in sinus rhythm today.  Is on amiodarone  7. Hyperlipidemia, unspecified hyperlipidemia type  Assessment & Plan:  Stable, continue current regimen  8. Acquired hypothyroidism  Assessment & Plan:  TSH was elevated and will increase the thyroid supplement to 75 mcg  9. Ganglion cyst of finger of left hand  Assessment & Plan:  Has 1 on the finger and 1 on the palm.  They are not tender today.  Try just to keep pressure off the areas         History of Present Illness         Patient had 3 recent hospitalizations the first for MI then for problems with syncope and was found to be in atrial flutter.  Did have pacemaker placed with defibrillator.  Last hospitalization was for GI blood loss and was found to have severe ulcerations in the stomach.  Is on protonic at this time as  well as Carafate and no longer having pain.  The Carafate has now been stopped.  Was started on levothyroxine for problems with hypothyroidism while in the hospital.  Is following up with cardiology and interventional cardiology in [place].  Is now doing cardiac rehab.  Does have some bumps on her left palm that are intermittently tender.  Has been watching her diet much more closely and last A1c was down to 6.7 is concerned it has not lost more weight although close are fitting better and has been getting regular exercise      Review of Systems   Constitutional:  Positive for fatigue. Negative for chills and fever.   HENT:  Negative for congestion, ear pain and sore throat.    Eyes:  Negative for pain and visual disturbance.   Respiratory:  Negative for cough, chest tightness and shortness of breath.    Cardiovascular:  Negative for chest pain and palpitations.   Gastrointestinal:  Negative for abdominal pain, constipation, diarrhea and vomiting.   Endocrine: Negative for polyuria.   Genitourinary:  Negative for enuresis and hematuria.   Musculoskeletal:  Positive for arthralgias (Lumps on her left palm that are sometimes tender). Negative for back pain.   Skin:  Negative for color change and rash.   Allergic/Immunologic: Negative for environmental allergies.   Neurological:  Negative for dizziness, seizures, syncope and light-headedness.   All other systems reviewed and are negative.    Past Medical History:   Diagnosis Date    Acute respiratory insufficiency 03/22/2024    Allergic     Chronic HFrEF (heart failure with reduced ejection fraction) ([facility])     Coronary artery disease     COVID-19 virus infection 11/28/2022    Diabetes mellitus (HCC)     Disease of thyroid gland 4/09/2024    Hyperlipidemia     Hypoglycemia     ICD (implantable cardioverter-defibrillator) in place     Ischemic cardiomyopathy     Lactic acidosis 03/22/2024    Myocardial infarction (HCC) 3/22/2024    Otitis media 1966    ST elevation  myocardial infarction involving left anterior descending (LAD) coronary artery ([facility]) 2024    Tobacco abuse     Visual impairment 1967     Past Surgical History:   Procedure Laterality Date    ADENOIDECTOMY      APPENDECTOMY      APPENDECTOMY LAPAROSCOPIC N/A 2024    Procedure: APPENDECTOMY LAPAROSCOPIC;  Surgeon: Lauryn Ullrich, DO;  Location: [facility];  Service: General    BREAST EXCISIONAL BIOPSY Right 2000    cyst removed- benign    CARDIAC CATHETERIZATION N/A 2024    Procedure: Cardiac pci;  Surgeon: Gabriel Myers MD;  Location: [facility];  Service: Cardiology    CARDIAC CATHETERIZATION N/A 2024    Procedure: Cardiac Coronary Angiogram;  Surgeon: Gabriel Myers MD;  [facility];  Service: Cardiology    CARDIAC CATHETERIZATION N/A 2024    Procedure: Cardiac PCI AMI;  Surgeon: Gabriel Myers MD;  [facility];  Service: Cardiology    CARDIAC CATHETERIZATION N/A 2024    Procedure: Cardiac IVUS;  Surgeon: Gabriel Myers MD;  [facility];  Service: Cardiology    CARDIAC ELECTROPHYSIOLOGY PROCEDURE N/A 2024    Procedure: Cardiac icd implant;  Surgeon: Jeffy Lama MD;  [facility];  Service: Cardiology     SECTION      ECTOPIC PREGNANCY SURGERY      SEPTOPLASTY      SPINE SURGERY  23    TONSILLECTOMY       Family History   Adopted: Yes   Problem Relation Age of Onset    Depression Mother     Heart disease Father     OCD Daughter      Social History     Tobacco Use    Smoking status: Former     Current packs/day: 0.00     Average packs/day: 1 pack/day for 24.2 years (24.2 total pack years)     Types: Cigarettes     Start date: 2000     Quit date: 3/22/2024     Years since quittin.4    Smokeless tobacco: Never    Tobacco comments:     Smoked 0.5-1 ppd; quit in 2024.    Vaping Use    Vaping status: Never Used   Substance and Sexual Activity    Alcohol use: Not Currently      Alcohol/week: 1.0 standard drink of alcohol     Types: 1 Shots of liquor per week     Comment: rarely    Drug use: Never    Sexual activity: Not Currently     Partners: Female     Birth control/protection: Post-menopausal     Current Outpatient Medications on File Prior to Visit   Medication Sig    albuterol (ProAir HFA) 90 mcg/act inhaler Inhale 2 puffs every 6 (six) hours as needed for wheezing    amiodarone 200 mg tablet Take 1 tablet (200 mg total) by mouth daily    apixaban (ELIQUIS) 5 mg Take 1 tablet (5 mg total) by mouth 2 (two) times a day    atorvastatin (LIPITOR) 80 mg tablet TAKE 1 TABLET BY MOUTH EVERY EVENING    clopidogrel (PLAVIX) 75 mg tablet Take 1 tablet (75 mg total) by mouth daily    escitalopram (LEXAPRO) 10 mg tablet Take 1 tablet (10 mg total) by mouth daily    furosemide (LASIX) 20 mg tablet TAKE 1 TABLET (20 MG TOTAL) BY MOUTH AS NEEDED (FOR RAPID WEIGHT GAIN (3+ LBS OVERNIGHT OR 5+ LBS IN 5-7 DAYS) OR WORSENING SHORTNESS OF BREATH)    gabapentin (Neurontin) 100 mg capsule Take 1 capsule (100 mg total) by mouth 2 (two) times a day (Patient taking differently: Take 100 mg by mouth 3 (three) times a day)    HYDROcodone-acetaminophen (NORCO) 5-325 mg per tablet Take 1 tablet by mouth every 6 (six) hours as needed for pain for up to 10 doses Max Daily Amount: 4 tablets    meclizine (ANTIVERT) 25 mg tablet Take 1 tablet (25 mg total) by mouth every 8 (eight) hours as needed for dizziness    metoprolol succinate (TOPROL-XL) 25 mg 24 hr tablet Take 2 tablets (50 mg total) by mouth daily at bedtime    Multiple Vitamin (MULTIVITAMIN) capsule Take 1 capsule by mouth daily    pantoprazole (PROTONIX) 40 mg tablet TAKE 1 TABLET BY MOUTH 2 TIMES A DAY BEFORE MEALS.    sacubitril-valsartan (Entresto) 24-26 MG TABS Take 1 tablet by mouth 2 (two) times a day    spironolactone (ALDACTONE) 25 mg tablet Take 1 tablet (25 mg total) by mouth daily    [DISCONTINUED] levothyroxine (Synthroid) 50 mcg tablet Take 1  "tablet (50 mcg total) by mouth daily     Allergies   Allergen Reactions    Shellfish-Derived Products - Food Allergy Anaphylaxis    Azithromycin Rash     Immunization History   Administered Date(s) Administered    COVID-19 MODERNA VACC 0.5 ML IM 04/19/2021, 05/17/2021    INFLUENZA 10/10/2019, 09/23/2021, 09/22/2022, 12/13/2023    Pneumococcal Conjugate Vaccine 20-valent (Pcv20), Polysace 04/04/2023    Tdap 09/18/2020    Typhoid, Unspecified 10/10/2019    Zoster Vaccine Recombinant 09/18/2020, 01/04/2021     Objective     Blood Pressure 110/60 (BP Location: Left arm, Patient Position: Sitting, Cuff Size: Large)   Height 5' 8\" (1.727 m)   Weight 96.6 kg (213 lb)   Body Mass Index 32.39 kg/m²     Physical Exam  Vitals and nursing note reviewed.   Constitutional:       General: She is not in acute distress.     Appearance: Normal appearance. She is well-developed.   HENT:      Head: Normocephalic and atraumatic.      Nose: Nose normal.   Eyes:      Conjunctiva/sclera: Conjunctivae normal.   Neck:      Vascular: No carotid bruit.   Cardiovascular:      Rate and Rhythm: Normal rate and regular rhythm.      Heart sounds: No murmur (Rate is 66) heard.  Pulmonary:      Effort: Pulmonary effort is normal. No respiratory distress.      Breath sounds: Normal breath sounds.   Abdominal:      General: Abdomen is flat.      Palpations: Abdomen is soft. There is no mass.      Tenderness: There is no abdominal tenderness.   Musculoskeletal:         General: No swelling.      Cervical back: Neck supple. No tenderness.      Right lower leg: No edema.      Left lower leg: No edema.   Lymphadenopathy:      Cervical: No cervical adenopathy.   Skin:     General: Skin is warm and dry.      Capillary Refill: Capillary refill takes less than 2 seconds.      Findings: No rash.   Neurological:      Mental Status: She is alert.      Motor: No weakness.      Coordination: Coordination normal.      Gait: Gait abnormal.      Deep Tendon " Reflexes: Reflexes abnormal (Reflexes diminished).   Psychiatric:         Mood and Affect: Mood normal.

## 2024-08-16 ENCOUNTER — CLINICAL SUPPORT (OUTPATIENT)
Dept: CARDIAC REHAB | Facility: CLINIC | Age: 64
End: 2024-08-16
Payer: COMMERCIAL

## 2024-08-16 DIAGNOSIS — I50.22 CHRONIC SYSTOLIC HEART FAILURE (HCC): Primary | ICD-10-CM

## 2024-08-16 PROCEDURE — 93798 PHYS/QHP OP CAR RHAB W/ECG: CPT

## 2024-08-19 ENCOUNTER — APPOINTMENT (OUTPATIENT)
Dept: CARDIAC REHAB | Facility: CLINIC | Age: 64
End: 2024-08-19
Payer: COMMERCIAL

## 2024-08-21 ENCOUNTER — LAB (OUTPATIENT)
Dept: LAB | Facility: CLINIC | Age: 64
End: 2024-08-21
Payer: COMMERCIAL

## 2024-08-21 ENCOUNTER — HOSPITAL ENCOUNTER (OUTPATIENT)
Dept: PULMONOLOGY | Facility: HOSPITAL | Age: 64
Discharge: HOME | End: 2024-08-21
Attending: INTERNAL MEDICINE
Payer: COMMERCIAL

## 2024-08-21 ENCOUNTER — CLINICAL SUPPORT (OUTPATIENT)
Dept: CARDIAC REHAB | Facility: CLINIC | Age: 64
End: 2024-08-21
Payer: COMMERCIAL

## 2024-08-21 DIAGNOSIS — E11.9 NEW ONSET TYPE 2 DIABETES MELLITUS (HCC): ICD-10-CM

## 2024-08-21 DIAGNOSIS — Z79.899 ON AMIODARONE THERAPY: ICD-10-CM

## 2024-08-21 DIAGNOSIS — I48.92 ATRIAL FLUTTER, PAROXYSMAL (HCC): Chronic | ICD-10-CM

## 2024-08-21 DIAGNOSIS — I50.22 CHRONIC SYSTOLIC HEART FAILURE (HCC): Primary | ICD-10-CM

## 2024-08-21 LAB
CREAT UR-MCNC: 87 MG/DL
MICROALBUMIN UR-MCNC: <7 MG/L

## 2024-08-21 PROCEDURE — 94010 BREATHING CAPACITY TEST: CPT

## 2024-08-21 PROCEDURE — 82043 UR ALBUMIN QUANTITATIVE: CPT

## 2024-08-21 PROCEDURE — 94760 N-INVAS EAR/PLS OXIMETRY 1: CPT

## 2024-08-21 PROCEDURE — 94010 BREATHING CAPACITY TEST: CPT | Performed by: INTERNAL MEDICINE

## 2024-08-21 PROCEDURE — 93798 PHYS/QHP OP CAR RHAB W/ECG: CPT

## 2024-08-21 PROCEDURE — 94729 DIFFUSING CAPACITY: CPT | Performed by: INTERNAL MEDICINE

## 2024-08-21 PROCEDURE — 94729 DIFFUSING CAPACITY: CPT

## 2024-08-21 PROCEDURE — 82570 ASSAY OF URINE CREATININE: CPT

## 2024-08-22 ENCOUNTER — TELEPHONE (OUTPATIENT)
Dept: FAMILY MEDICINE CLINIC | Facility: CLINIC | Age: 64
End: 2024-08-22

## 2024-08-22 ENCOUNTER — HOSPITAL ENCOUNTER (OUTPATIENT)
Dept: RADIOLOGY | Facility: CLINIC | Age: 64
End: 2024-08-22
Payer: COMMERCIAL

## 2024-08-22 VITALS — WEIGHT: 202 LBS | HEIGHT: 68 IN | BODY MASS INDEX: 30.62 KG/M2

## 2024-08-22 DIAGNOSIS — Z78.0 ASYMPTOMATIC POSTMENOPAUSAL STATE: ICD-10-CM

## 2024-08-22 PROCEDURE — 77080 DXA BONE DENSITY AXIAL: CPT

## 2024-08-22 NOTE — TELEPHONE ENCOUNTER
----- Message from Brandon Pete MD sent at 8/22/2024  8:59 AM EDT -----  Please call patient and inform of normal urine screen for protein

## 2024-08-22 NOTE — TELEPHONE ENCOUNTER
"Attempted to reach Vesna to relate the message per Dr. Pete \"Please call patient and inform of normal urine screen for protein\" Patient didn't answer/ left a voicemail.   "

## 2024-08-23 ENCOUNTER — APPOINTMENT (OUTPATIENT)
Dept: CARDIAC REHAB | Facility: CLINIC | Age: 64
End: 2024-08-23
Payer: COMMERCIAL

## 2024-08-23 ENCOUNTER — CLINICAL SUPPORT (OUTPATIENT)
Dept: CARDIAC REHAB | Facility: CLINIC | Age: 64
End: 2024-08-23
Payer: COMMERCIAL

## 2024-08-23 DIAGNOSIS — I50.22 CHRONIC SYSTOLIC HEART FAILURE (HCC): Primary | ICD-10-CM

## 2024-08-23 PROCEDURE — 93798 PHYS/QHP OP CAR RHAB W/ECG: CPT

## 2024-08-23 NOTE — PROGRESS NOTES
CARDIAC REHABILITATION ASSESSMENT AND INDIVIDUALIZED TREATMENT PLAN  90 DAY      Today's date: 2024   # of Exercise Sessions Completed: 29  Patient name: Vesna Langston      : 1960  Age: 64 y.o.       MRN: [id number]  Referring Physician: Vitor Bell MD  Cardiologist: Vitor Bell MD  Provider: [place]  Clinician: Velia Villalta MS, [facility] [place], [place]      Comments:   Vesna is nearing discharge from cardiac rehab and continues to do well, handling increased exercise intensities and gradually increased weights with strength training. The only weight we have not been able to increase at this point is her cable tricep extension due to back discomfort. Vesna has been walking for 1 mile, 3 days/week at a local park and was encouraged to slowly increase her distances (no more than 0.25 mile at a time) to help her reach her goal of walking a 5K in October. She also attends PT 2 days/week and is looking forward to starting aqua therapy with PT. Vesna is working part time and is very active during her job, walking up to 2 miles (often discontinuous) in a shift. There is a possibility she may have surgery on her back to adjust the screws currently there but she is unsure at this time.     24: Vesna is tolerating exercise well in cardiac rehab. She completes 40-42 minutes of exercise at 2.5-3.0 METs without cardiac complaints. She has been tolerating slow increases in weight while weight training. Vesna is limited by her back injury - no twisting or bending tolerated. NSR with intermittent pacing on telemetry with rare PVCs. Resting HR 62-82 bpm with increased HR to  bpm. Normal resting and exercise BP. Vesna has been very actively reading her food labels, paying attention to sodium, added sugar and saturated fat content. She has maintained her weight at 208-209 lb. Her depression symptoms have improved and she is enjoying cardiac rehab so far.       Dx:   Encounter Diagnosis   Name Primary?    Chronic systolic heart  failure ([facility]) Yes       Description of Diagnosis: S/p MI in 03/2024; received PCI to 100% stenosis of ostial/proximal LAD.   Date of onset: 3/22/24  Other Cardiac History: HFrEF, LVEF 30%; Atrial flutter; monomorphic ventricular tachycardia - S/p secondary prevention ICD (3/27/24)      ASSESSMENT    Medical History:   Past Medical History:   Diagnosis Date    Acute respiratory insufficiency 03/22/2024    Allergic     Chronic HFrEF (heart failure with reduced ejection fraction) ([facility])     Coronary artery disease     COVID-19 virus infection 11/28/2022    Diabetes mellitus ([facility])     Disease of thyroid gland 4/09/2024    Hyperlipidemia     Hypoglycemia     ICD (implantable cardioverter-defibrillator) in place     Ischemic cardiomyopathy     Lactic acidosis 03/22/2024    Myocardial infarction ([facility]) 3/22/2024    Otitis media 1966    ST elevation myocardial infarction involving left anterior descending (LAD) coronary artery ([facility]) 03/22/2024    Tobacco abuse     Visual impairment 1967       Family History:  Family History   Adopted: Yes   Problem Relation Age of Onset    Depression Mother     Heart disease Father     OCD Daughter        Allergies:   Shellfish-derived products - food allergy and Azithromycin    Current Medications:   Current Outpatient Medications   Medication Sig Dispense Refill    albuterol (ProAir HFA) 90 mcg/act inhaler Inhale 2 puffs every 6 (six) hours as needed for wheezing 8.5 g 0    amiodarone 200 mg tablet Take 1 tablet (200 mg total) by mouth daily 30 tablet 2    apixaban (ELIQUIS) 5 mg Take 1 tablet (5 mg total) by mouth 2 (two) times a day 60 tablet 5    atorvastatin (LIPITOR) 80 mg tablet TAKE 1 TABLET BY MOUTH EVERY EVENING 100 tablet 1    clopidogrel (PLAVIX) 75 mg tablet Take 1 tablet (75 mg total) by mouth daily 30 tablet 5    escitalopram (LEXAPRO) 10 mg tablet Take 1 tablet (10 mg total) by mouth daily 30 tablet 5    furosemide (LASIX) 20 mg tablet TAKE 1 TABLET (20 MG TOTAL) BY MOUTH AS  NEEDED (FOR RAPID WEIGHT GAIN (3+ LBS OVERNIGHT OR 5+ LBS IN 5-7 DAYS) OR WORSENING SHORTNESS OF BREATH) 90 tablet 1    gabapentin (Neurontin) 100 mg capsule Take 1 capsule (100 mg total) by mouth 2 (two) times a day (Patient taking differently: Take 100 mg by mouth 3 (three) times a day)      HYDROcodone-acetaminophen (NORCO) 5-325 mg per tablet Take 1 tablet by mouth every 6 (six) hours as needed for pain for up to 10 doses Max Daily Amount: 4 tablets 10 tablet 0    levothyroxine (Synthroid) 75 mcg tablet Take 1 tablet (75 mcg total) by mouth daily 90 tablet 3    meclizine (ANTIVERT) 25 mg tablet Take 1 tablet (25 mg total) by mouth every 8 (eight) hours as needed for dizziness 30 tablet 0    metoprolol succinate (TOPROL-XL) 25 mg 24 hr tablet Take 2 tablets (50 mg total) by mouth daily at bedtime 180 tablet 5    Multiple Vitamin (MULTIVITAMIN) capsule Take 1 capsule by mouth daily      pantoprazole (PROTONIX) 40 mg tablet TAKE 1 TABLET BY MOUTH 2 TIMES A DAY BEFORE MEALS. 60 tablet 3    sacubitril-valsartan (Entresto) 24-26 MG TABS Take 1 tablet by mouth 2 (two) times a day 180 tablet 5    spironolactone (ALDACTONE) 25 mg tablet Take 1 tablet (25 mg total) by mouth daily 90 tablet 5     No current facility-administered medications for this visit.       Medication compliance: Yes   Comments: Pt reports to be compliant with medications    Physical Limitations: History of back surgery with complications, PT for 2 yrs now. Recent appendectomy, 10 lb weight restriction    Fall Risk: High   Comments: Reports a fall in the past 6 months (syncope x 4)    Cultural needs: None      CAD Risk Factors:  Cholesterol: Yes  HTN: No  DM: Type 2   Obesity: Yes   Inactivity: Yes      EXERCISE ASSESSMENT:     Initial Fitness Assessment: 6MWT:  Resting:    Resting:  BP: 120/72  HR: 60 bpm, Exercise:  BP: 146/74  HR: 108 bpm, METs:  2.6 (1,075 ft), ECG Summary: NSR, intermittent atrial pacing, Symptoms: very slightly labored breathing  with faster walking, recovered quickly, and Comments: walked holding cane due to recommendation from orthopedic surgeon      Functional Status Prior to Diagnosis for Treatment:   Occupation: part time job  at [employer]  Recreation/Physical Activity: gardening, cooking  ADL’s: limited by Orthopedic limitations   [place]: able to perform self-care  Home exercise equipment:  None  Home exercise: walking a lot at work    Current Functional Status:  Occupation: part time work -  4 hours/day - [employer]  Recreation/Physical Activity: lawn mowing, weedwacker and leaf blowing (5-7 lb); watching grandchildren; cross-stitch; craft wreaths  ADL’s:Capable of performing light ADLs only limited by Orthopedic limitations   [place]: able to perform self-care  Home exercise: walking 1 mile 3 days of the week    Functional Capacity Screening Tool:  [facility] Activity Status Index:  5.07 METs      PSYCHOSOCIAL ASSESSMENT:    Depression screening:  PHQ-9 = 8    Interpretation:  5-9 = Mild Depression  Anxiety screening:  BECCA-7 = 7    Interpretation: 5-9 = Mild anxiety    Pt self-report of depression and anxiety   Patient reports feelings of depression   Reports sufficient emotional support and is compliant with medical therapy (potentially short term)      Self-reported stress level:  5      Quality of Life Screen:  (Higher score indicates disease impact on QOL)  [facility] score: 24/40        Social Support:   children, grandchildren, neighbors, and friends     Psychosocial Assessment as it relates to rehabilitation:   Patient denies issues with his/her family or home life that may affect their rehabilitation efforts.       NUTRITION ASSESSMENT:    Rate Your Plate Score: 73/81    Diabetes: T2D, Patient monitors BS 1 times/day, Patient reported fasting -125  A1c: 7.9%    last measured: 3/22/24    Lipid management: Last lipid profile 4/22/24  Chol 142    HDL 45  LDL 70    Current Dietary  Habits:  Reducing sodium, increased fresh fruit and vegetables      OTHER CORE COMPONENT ASSESSMENT:    Tobacco Use:     Pt quit 3/22/24   and has abstained - was smoking 0.5 -1.0 ppd and had cut back and taking Chantix prior to MI    Anginal Symptoms:  SOB   NTG use: No prescription        INDIVIDUALIZED TREATMENT PLAN    See outlined plan of care below for specific patient goals in each component of care.        EXERCISE GOAL and PLAN    SMART Goals:   improved DASI score by 10%  increased exercise capacity by 40% based on peak METs tolerated in cardiac rehab exercise session  maintain > 150 minutes per week of moderate intensity exercise  improved 6MWT distance by 10%    Patient Specific EXERCISE GOALS:       attend rehab regularly, start a home exercise program, and improve mobility and exercise tolerance, join [employer], 5K walk in Oct    Progress toward SMART and personal Exercise goals:  pt walking 1 mile 3 days/week, going to PT 2 days/week; is back to work part time, compliant with cardiac rehab exercise, increased exercise intensity in cardiac rehab    Plan for next 30 days:    home exercise 30+ mins 2 days opposite CR and continue with gradual increase in exercise intensity and walking distance - prep for 5k walk in October    The patient was counseled on exercise guidelines to achieve a minimum of 150 mins/wk of moderate intensity (RPE 4-6)   exercise and encouraged to add 1-2 days of exercise on opposite days of cardiac rehab as tolerated.     Current Aerobic Exercise Prescription:      Frequency: 3 days/week   Supplement with home exercise 2+ days/wk as tolerated       Minutes: 40 min         METS: 3-3.7          HR:  bpm   RPE: 4-6         Modalities: Treadmill, UBE, NuStep, and Recumbent bike     Exercise workloads will be progressed gradually as tolerated, within limits of patient's ability, and according to the patient's   response to the exercise program.      Aerobic Exercise  Prescription - 30 day goal:   Frequency: 3 days/week of cardiac rehab       Supplement with home exercise 2+ days/wk as tolerated    Minutes: 40       >150 mins/wk of moderate intensity exercise   METS: 3-4   HR:  RHR +30-40bpm ( bpm)/107-126 bpm (50-70% HRR)     RPE: 4-6   Modalities: Treadmill, UBE, Lifecycle, NuStep, Recumbent bike, and Room walking    Strength trainin-3 days / week  12-15 repetitions  1-2 sets per modality   With 10 lb restriction with very gradual increases approved with PT   Modalities: Leg Press, Chest Press, Lateral Raise, Arm Extension, Arm Curl, Front Raises, Shoulder Shrugs, and Calf Raises    Home Exercise: Type: walking, Frequency: 5-7 days/week, Distance 1 mile    Group and Individual Education: benefit of exercise for CAD risk factors, home exercise guidelines, AHA guidelines to achieve >150 mins/wk of moderate exercise, RPE scale, and Group class: Risk Factors for Heart Disease     Readiness to change: Action:  (Changing behavior)      NUTRITION GOALS AND PLAN    Weight control:    Starting weight: 209.8 lb   Current weight: 211.8 lb    SMART Goals:   reduced BMI to < 25, reduced waist circumference <35 inches (F), fasting BG , and Improved Rate Your Plate score  >64    Patient Specific NUTRITION GOALS:     30 lb weight loss    Patient's progress toward SMART and personal Nutrition goals:   Pt gained 2 lbs, is reading her food labels and follow a heart healthy diet.Will check waist circumference upon discharge in 2 weeks    Plan for next 30 days:   increase daily intake of fruits and vegetables and rarely/never eat salty snacks (no seafood - allergic)    Group and Individual Education:   heart healthy eating principles  low sodium diet  nutrition for  lipid management  exercise and diabetes management   group class: Heart Healthy Eating  group class:  Label Reading    Readiness to change: Maintenance: (Maintaining the behavior change)      PSYCHOSOCIAL ASSESSMENT AND  PLAN    Psychosocial Assessment as it relates to rehabilitation:   Patient denies issues with his/her family or home life that may affect their rehabilitation efforts.     SMART Goals:     Reduce perceived stress to 1-3/10 and PHQ-9 - reduced severity by one level    Patient Specific PSYCHOCOSOCIAL GOALS:     maintain compliance with medical therapy, spend time with family, and vacation with family end of July    Patient's progress toward SMART and personal Psychosocial goals:   Goals met: vacation with family end of July and Pt denies symptoms of depression or anxiety and manages stress well    Plan for next 30 days:   Exercise, Spend time outdoors, Keep a positive mindset, Enjoy family, and Return to previous social activity    Group and Individual Education: signs/sxs of depression, benefits of a positive support system, depression and CAD, and class:  Stress and Your Health     Readiness to change: Maintenance: (Maintaining the behavior change)      OTHER CORE COMPONENTS GOALS and PLAN    Tobacco Intervention:   Pt quit 3/22/24 and has abstained since quitting.      SMART Goals:   medication compliance    Patient Specific CORE COMPONENT GOALS:    Improved EF%    Progress toward SMART and personal Core Component goals:    Continues to abstain from smoking, normal resting and exercise BP, pt is fully compliant with medications    Plan for next 30 days:   group class: Common Heart Medications, medication compliance, engage in regular exercise, check labels for sodium content, and monitor home BP    Group and Individual Education:  low sodium diet and heart failure, identifying smoking triggers, and group class: Understanding Heart Disease    Readiness to change: Action:  (Changing behavior)

## 2024-08-25 ENCOUNTER — APPOINTMENT (EMERGENCY)
Dept: RADIOLOGY | Facility: HOSPITAL | Age: 64
End: 2024-08-25
Payer: COMMERCIAL

## 2024-08-25 ENCOUNTER — NURSE TRIAGE (OUTPATIENT)
Dept: OTHER | Facility: OTHER | Age: 64
End: 2024-08-25

## 2024-08-25 ENCOUNTER — HOSPITAL ENCOUNTER (EMERGENCY)
Facility: HOSPITAL | Age: 64
Discharge: HOME | End: 2024-08-25
Attending: EMERGENCY MEDICINE
Payer: COMMERCIAL

## 2024-08-25 VITALS
BODY MASS INDEX: 43.8 KG/M2 | TEMPERATURE: 98.1 F | OXYGEN SATURATION: 98 % | RESPIRATION RATE: 16 BRPM | HEART RATE: 60 BPM | HEIGHT: 68 IN | WEIGHT: 289.02 LBS | DIASTOLIC BLOOD PRESSURE: 70 MMHG | SYSTOLIC BLOOD PRESSURE: 107 MMHG

## 2024-08-25 DIAGNOSIS — R07.9 CHEST PAIN: Primary | ICD-10-CM

## 2024-08-25 LAB
2HR DELTA HS TROPONIN: -1 NG/L
ALBUMIN SERPL BCG-MCNC: 4.3 G/DL (ref 3.5–5)
ALP SERPL-CCNC: 45 U/L (ref 34–104)
ALT SERPL W P-5'-P-CCNC: 20 U/L (ref 7–52)
ANION GAP SERPL CALCULATED.3IONS-SCNC: 10 MMOL/L (ref 4–13)
APTT PPP: 31 SECONDS (ref 23–34)
AST SERPL W P-5'-P-CCNC: 21 U/L (ref 13–39)
BASOPHILS # BLD AUTO: 0.07 THOUSANDS/ÂΜL (ref 0–0.1)
BASOPHILS NFR BLD AUTO: 1 % (ref 0–1)
BILIRUB SERPL-MCNC: 0.53 MG/DL (ref 0.2–1)
BNP SERPL-MCNC: 73 PG/ML (ref 0–100)
BUN SERPL-MCNC: 19 MG/DL (ref 5–25)
CALCIUM SERPL-MCNC: 9.6 MG/DL (ref 8.4–10.2)
CARDIAC TROPONIN I PNL SERPL HS: 5 NG/L
CARDIAC TROPONIN I PNL SERPL HS: 6 NG/L
CHLORIDE SERPL-SCNC: 102 MMOL/L (ref 96–108)
CO2 SERPL-SCNC: 21 MMOL/L (ref 21–32)
CREAT SERPL-MCNC: 0.92 MG/DL (ref 0.6–1.3)
EOSINOPHIL # BLD AUTO: 0.41 THOUSAND/ÂΜL (ref 0–0.61)
EOSINOPHIL NFR BLD AUTO: 5 % (ref 0–6)
ERYTHROCYTE [DISTWIDTH] IN BLOOD BY AUTOMATED COUNT: 18 % (ref 11.6–15.1)
GFR SERPL CREATININE-BSD FRML MDRD: 65 ML/MIN/1.73SQ M
GLUCOSE SERPL-MCNC: 237 MG/DL (ref 65–140)
HCT VFR BLD AUTO: 40.5 % (ref 34.8–46.1)
HGB BLD-MCNC: 12 G/DL (ref 11.5–15.4)
IMM GRANULOCYTES # BLD AUTO: 0.02 THOUSAND/UL (ref 0–0.2)
IMM GRANULOCYTES NFR BLD AUTO: 0 % (ref 0–2)
INR PPP: 1.08 (ref 0.85–1.19)
LYMPHOCYTES # BLD AUTO: 2.23 THOUSANDS/ÂΜL (ref 0.6–4.47)
LYMPHOCYTES NFR BLD AUTO: 28 % (ref 14–44)
MAGNESIUM SERPL-MCNC: 2 MG/DL (ref 1.9–2.7)
MCH RBC QN AUTO: 24.8 PG (ref 26.8–34.3)
MCHC RBC AUTO-ENTMCNC: 29.6 G/DL (ref 31.4–37.4)
MCV RBC AUTO: 84 FL (ref 82–98)
MONOCYTES # BLD AUTO: 0.7 THOUSAND/ÂΜL (ref 0.17–1.22)
MONOCYTES NFR BLD AUTO: 9 % (ref 4–12)
NEUTROPHILS # BLD AUTO: 4.58 THOUSANDS/ÂΜL (ref 1.85–7.62)
NEUTS SEG NFR BLD AUTO: 57 % (ref 43–75)
NRBC BLD AUTO-RTO: 0 /100 WBCS
PLATELET # BLD AUTO: 279 THOUSANDS/UL (ref 149–390)
PMV BLD AUTO: 9.5 FL (ref 8.9–12.7)
POTASSIUM SERPL-SCNC: 4 MMOL/L (ref 3.5–5.3)
PROT SERPL-MCNC: 7.4 G/DL (ref 6.4–8.4)
PROTHROMBIN TIME: 14.4 SECONDS (ref 12.3–15)
RBC # BLD AUTO: 4.84 MILLION/UL (ref 3.81–5.12)
SODIUM SERPL-SCNC: 133 MMOL/L (ref 135–147)
WBC # BLD AUTO: 8.01 THOUSAND/UL (ref 4.31–10.16)

## 2024-08-25 PROCEDURE — 93005 ELECTROCARDIOGRAM TRACING: CPT

## 2024-08-25 PROCEDURE — 85025 COMPLETE CBC W/AUTO DIFF WBC: CPT | Performed by: EMERGENCY MEDICINE

## 2024-08-25 PROCEDURE — 83880 ASSAY OF NATRIURETIC PEPTIDE: CPT | Performed by: EMERGENCY MEDICINE

## 2024-08-25 PROCEDURE — 80053 COMPREHEN METABOLIC PANEL: CPT | Performed by: EMERGENCY MEDICINE

## 2024-08-25 PROCEDURE — 99285 EMERGENCY DEPT VISIT HI MDM: CPT

## 2024-08-25 PROCEDURE — 36415 COLL VENOUS BLD VENIPUNCTURE: CPT | Performed by: EMERGENCY MEDICINE

## 2024-08-25 PROCEDURE — 83735 ASSAY OF MAGNESIUM: CPT | Performed by: EMERGENCY MEDICINE

## 2024-08-25 PROCEDURE — 84484 ASSAY OF TROPONIN QUANT: CPT | Performed by: EMERGENCY MEDICINE

## 2024-08-25 PROCEDURE — 85730 THROMBOPLASTIN TIME PARTIAL: CPT | Performed by: EMERGENCY MEDICINE

## 2024-08-25 PROCEDURE — 85610 PROTHROMBIN TIME: CPT | Performed by: EMERGENCY MEDICINE

## 2024-08-25 PROCEDURE — 99285 EMERGENCY DEPT VISIT HI MDM: CPT | Performed by: EMERGENCY MEDICINE

## 2024-08-25 PROCEDURE — 71046 X-RAY EXAM CHEST 2 VIEWS: CPT

## 2024-08-25 NOTE — ED CARE HANDOFF
Emergency Department Sign Out Note        Sign out and transfer of care from Dr. Manzanares. See Separate Emergency Department note.     The patient, Vesna Langston, was evaluated by the previous provider for chest pain.    Workup Completed:  Initial evaluation including EKG and first troponin negative.  Awaiting second troponin    ED Course / Workup Pending (followup):  Awaiting second troponin  Patient remains pain-free in the emergency department.  EKG was negative.  Repeat troponin was negative.  Appropriate for discharge.  Patient advised to follow-up with her cardiologist.  She is agreeable.    XR chest 2 views   ED Interpretation   No acute findings        Results for orders placed or performed during the hospital encounter of 08/25/24   CBC and differential   Result Value Ref Range    WBC 8.01 4.31 - 10.16 Thousand/uL    RBC 4.84 3.81 - 5.12 Million/uL    Hemoglobin 12.0 11.5 - 15.4 g/dL    Hematocrit 40.5 34.8 - 46.1 %    MCV 84 82 - 98 fL    MCH 24.8 (L) 26.8 - 34.3 pg    MCHC 29.6 (L) 31.4 - 37.4 g/dL    RDW 18.0 (H) 11.6 - 15.1 %    MPV 9.5 8.9 - 12.7 fL    Platelets 279 149 - 390 Thousands/uL    nRBC 0 /100 WBCs    Segmented % 57 43 - 75 %    Immature Grans % 0 0 - 2 %    Lymphocytes % 28 14 - 44 %    Monocytes % 9 4 - 12 %    Eosinophils Relative 5 0 - 6 %    Basophils Relative 1 0 - 1 %    Absolute Neutrophils 4.58 1.85 - 7.62 Thousands/µL    Absolute Immature Grans 0.02 0.00 - 0.20 Thousand/uL    Absolute Lymphocytes 2.23 0.60 - 4.47 Thousands/µL    Absolute Monocytes 0.70 0.17 - 1.22 Thousand/µL    Eosinophils Absolute 0.41 0.00 - 0.61 Thousand/µL    Basophils Absolute 0.07 0.00 - 0.10 Thousands/µL   Comprehensive metabolic panel   Result Value Ref Range    Sodium 133 (L) 135 - 147 mmol/L    Potassium 4.0 3.5 - 5.3 mmol/L    Chloride 102 96 - 108 mmol/L    CO2 21 21 - 32 mmol/L    ANION GAP 10 4 - 13 mmol/L    BUN 19 5 - 25 mg/dL    Creatinine 0.92 0.60 - 1.30 mg/dL    Glucose 237 (H) 65 - 140 mg/dL     "Calcium 9.6 8.4 - 10.2 mg/dL    AST 21 13 - 39 U/L    ALT 20 7 - 52 U/L    Alkaline Phosphatase 45 34 - 104 U/L    Total Protein 7.4 6.4 - 8.4 g/dL    Albumin 4.3 3.5 - 5.0 g/dL    Total Bilirubin 0.53 0.20 - 1.00 mg/dL    eGFR 65 ml/min/1.73sq m   Protime-INR   Result Value Ref Range    Protime 14.4 12.3 - 15.0 seconds    INR 1.08 0.85 - 1.19   APTT   Result Value Ref Range    PTT 31 23 - 34 seconds   Magnesium   Result Value Ref Range    Magnesium 2.0 1.9 - 2.7 mg/dL   HS Troponin 0hr (reflex protocol)   Result Value Ref Range    hs TnI 0hr 6 \"Refer to ACS Flowchart\"- see link ng/L   B-Type Natriuretic Peptide(BNP)   Result Value Ref Range    BNP 73 0 - 100 pg/mL   HS Troponin I 2hr   Result Value Ref Range    hs TnI 2hr 5 \"Refer to ACS Flowchart\"- see link ng/L    Delta 2hr hsTnI -1 <20 ng/L           HEART Risk Score      Flowsheet Row Most Recent Value   Heart Score Risk Calculator    History 0 Filed at: 08/25/2024 0537   ECG 0 Filed at: 08/25/2024 0537   Age 2 Filed at: 08/25/2024 0537   Risk Factors 2 Filed at: 08/25/2024 0537   Troponin 0 Filed at: 08/25/2024 0537   HEART Score 4 Filed at: 08/25/2024 0537                                       Procedures  Medical Decision Making  Amount and/or Complexity of Data Reviewed  Labs: ordered.  Radiology: ordered and independent interpretation performed.            Disposition  Final diagnoses:   Chest pain     Time reflects when diagnosis was documented in both MDM as applicable and the Disposition within this note       Time User Action Codes Description Comment    8/25/2024  7:13 AM Alexx Allen Add [R07.9] Chest pain           ED Disposition       ED Disposition   Discharge    Condition   Stable    Date/Time   Sun Aug 25, 2024 0713    Comment   Vesna Langston discharge to home/self care.                   Follow-up Information       Follow up With Specialties Details Why Contact Info    Brandon Pete MD Family Medicine   [address] [place] [address]" [facility] [address]  [phone]            Patient's Medications   Discharge Prescriptions    No medications on file     No discharge procedures on file.       ED Provider  Electronically Signed by     Alexx Allen MD  08/25/24 0708

## 2024-08-25 NOTE — Clinical Note
Vesna Langston was seen and treated in our emergency department on 8/25/2024.    No restrictions            Diagnosis:     Vesna  may return to school on return date.    She may return on this date: 08/27/2024         If you have any questions or concerns, please don't hesitate to call.      Alexx Allen MD    ______________________________           _______________          _______________  Hospital Representative                              Date                                Time

## 2024-08-25 NOTE — DISCHARGE INSTRUCTIONS
The workup performed today in the emergency department including the EKG, the chest x-ray, both troponins were negative.  However, it is recommended that you schedule follow-up appointment with your cardiologist.

## 2024-08-25 NOTE — ED NOTES
Patient ambulated to restroom at this time. No complaints of dizziness/SOB at this time. Patient walks with a steady gait, no complaints at this time     Kike Godinez RN  08/25/24 0649

## 2024-08-25 NOTE — ED NOTES
Report received. Pt resting comfortable. Questions answered at bedside.      Maryanne Fuller RN  08/25/24 4466

## 2024-08-25 NOTE — ED PROVIDER NOTES
History  Chief Complaint   Patient presents with    Chest Pain     Pt reports waking up this morning to get ready for work and started with chest heaviness. C/o feeling like something is sitting on her chest and that her left arm is tingling. PMHx of a heart attack. Has a pacemaker.      Patient is a 64-year-old female presenting to the emergency department complaining of chest heaviness and tightness with shortness of breath and diaphoresis lasting approximately 1 minute, symptoms occurred while patient was getting ready for work this morning, she reports going to bed last night feeling fine with no symptoms, states she took her blood pressure and her blood sugar at home, her blood pressure was 133/80 which is high for her, her blood sugar was 150 which is high for her as well, she took her medications at their normal time yesterday evening and denies missing any doses recently, she does have a history of a previous STEMI in March of this year, states her only symptoms at that time were shortness of breath and not similar to today        Prior to Admission Medications   Prescriptions Last Dose Informant Patient Reported? Taking?   HYDROcodone-acetaminophen (NORCO) 5-325 mg per tablet  Self No No   Sig: Take 1 tablet by mouth every 6 (six) hours as needed for pain for up to 10 doses Max Daily Amount: 4 tablets   Multiple Vitamin (MULTIVITAMIN) capsule  Self Yes No   Sig: Take 1 capsule by mouth daily   Patient not taking: Reported on 8/27/2024   albuterol (ProAir HFA) 90 mcg/act inhaler  Self No No   Sig: Inhale 2 puffs every 6 (six) hours as needed for wheezing   amiodarone 200 mg tablet  Self No No   Sig: Take 1 tablet (200 mg total) by mouth daily   apixaban (ELIQUIS) 5 mg  Self No No   Sig: Take 1 tablet (5 mg total) by mouth 2 (two) times a day   atorvastatin (LIPITOR) 80 mg tablet  Self No No   Sig: TAKE 1 TABLET BY MOUTH EVERY EVENING   clopidogrel (PLAVIX) 75 mg tablet  Self No No   Sig: Take 1 tablet (75 mg  total) by mouth daily   gabapentin (Neurontin) 100 mg capsule  Self No No   Sig: Take 1 capsule (100 mg total) by mouth 2 (two) times a day   Patient taking differently: Take 100 mg by mouth 3 (three) times a day   levothyroxine (Synthroid) 75 mcg tablet  Self No No   Sig: Take 1 tablet (75 mcg total) by mouth daily   meclizine (ANTIVERT) 25 mg tablet  Self No No   Sig: Take 1 tablet (25 mg total) by mouth every 8 (eight) hours as needed for dizziness   metoprolol succinate (TOPROL-XL) 25 mg 24 hr tablet  Self No No   Sig: Take 2 tablets (50 mg total) by mouth daily at bedtime   pantoprazole (PROTONIX) 40 mg tablet  Self No No   Sig: TAKE 1 TABLET BY MOUTH 2 TIMES A DAY BEFORE MEALS.   spironolactone (ALDACTONE) 25 mg tablet  Self No No   Sig: Take 1 tablet (25 mg total) by mouth daily      Facility-Administered Medications: None       Past Medical History:   Diagnosis Date    Acute respiratory insufficiency 03/22/2024    Allergic     Chronic HFrEF (heart failure with reduced ejection fraction) ([facility])     Coronary artery disease     COVID-19 virus infection 11/28/2022    Diabetes mellitus ([facility])     Disease of thyroid gland 4/09/2024    Hyperlipidemia     Hypoglycemia     ICD (implantable cardioverter-defibrillator) in place     Ischemic cardiomyopathy     Lactic acidosis 03/22/2024    Myocardial infarction ([facility]) 3/22/2024    Otitis media 1966    ST elevation myocardial infarction involving left anterior descending (LAD) coronary artery ([facility]) 03/22/2024    Tobacco abuse     Visual impairment 1967       Past Surgical History:   Procedure Laterality Date    ADENOIDECTOMY      APPENDECTOMY      APPENDECTOMY LAPAROSCOPIC N/A 05/08/2024    Procedure: APPENDECTOMY LAPAROSCOPIC;  Surgeon: Lauryn Ullrich, DO;  Location: [facility];  Service: General    BREAST EXCISIONAL BIOPSY Right 09/2000    cyst removed- benign    CARDIAC CATHETERIZATION N/A 03/22/2024    Procedure: Cardiac pci;  Surgeon: Gabriel Myers MD;  [facility]: [facility]  CARDIAC CATH LAB;  Service: Cardiology    CARDIAC CATHETERIZATION N/A 2024    Procedure: Cardiac Coronary Angiogram;  Surgeon: Gabriel Myers MD;  [facility];  Service: Cardiology    CARDIAC CATHETERIZATION N/A 2024    Procedure: Cardiac PCI AMI;  Surgeon: Gabriel Myers MD;  [facility];  Service: Cardiology    CARDIAC CATHETERIZATION N/A 2024    Procedure: Cardiac IVUS;  Surgeon: Gabriel Myers MD;  [facility];  Service: Cardiology    CARDIAC ELECTROPHYSIOLOGY PROCEDURE N/A 2024    Procedure: Cardiac icd implant;  Surgeon: Jeffy Lama MD;  [facility]ARD[facility];  Service: Cardiology     SECTION      ECTOPIC PREGNANCY SURGERY      SEPTOPLASTY      SPINE SURGERY  23    TONSILLECTOMY         Family History   Adopted: Yes   Problem Relation Age of Onset    Depression Mother     Heart disease Father     OCD Daughter      I have reviewed and agree with the history as documented.    E-Cigarette/Vaping    E-Cigarette Use Never User      E-Cigarette/Vaping Substances    Nicotine No     THC No     CBD No     Flavoring No     Other No     Unknown No      Social History     Tobacco Use    Smoking status: Former     Current packs/day: 0.00     Average packs/day: 1 pack/day for 24.2 years (24.2 ttl pk-yrs)     Types: Cigarettes     Start date: 2000     Quit date: 3/22/2024     Years since quittin.4    Smokeless tobacco: Never    Tobacco comments:     Smoked 0.5-1 ppd; quit in 2024.    Vaping Use    Vaping status: Never Used   Substance Use Topics    Alcohol use: Not Currently     Alcohol/week: 1.0 standard drink of alcohol     Types: 1 Shots of liquor per week     Comment: rarely    Drug use: Never       Review of Systems   Constitutional:  Positive for diaphoresis.   HENT: Negative.     Eyes: Negative.    Respiratory:  Positive for chest tightness and shortness of breath.    Cardiovascular:  Positive for chest pain.    Gastrointestinal: Negative.    Endocrine: Negative.    Genitourinary: Negative.    Musculoskeletal: Negative.    Skin: Negative.    Allergic/Immunologic: Negative.    Neurological: Negative.    Hematological: Negative.    Psychiatric/Behavioral: Negative.         Physical Exam  Physical Exam  Constitutional:       Appearance: She is well-developed.   HENT:      Head: Normocephalic and atraumatic.   Eyes:      Conjunctiva/sclera: Conjunctivae normal.      Pupils: Pupils are equal, round, and reactive to light.   Cardiovascular:      Rate and Rhythm: Normal rate.   Pulmonary:      Effort: Pulmonary effort is normal.   Abdominal:      Palpations: Abdomen is soft.   Musculoskeletal:         General: Normal range of motion.      Cervical back: Normal range of motion and neck supple.   Skin:     General: Skin is warm and dry.   Neurological:      Mental Status: She is alert and oriented to person, place, and time.         Vital Signs  ED Triage Vitals [08/25/24 0434]   Temperature Pulse Respirations Blood Pressure SpO2   97.9 °F (36.6 °C) 61 18 141/65 99 %      Temp Source Heart Rate Source Patient Position - Orthostatic VS BP Location FiO2 (%)   Temporal Monitor Lying Left arm --      Pain Score       No Pain           Vitals:    08/25/24 0600 08/25/24 0639 08/25/24 0645 08/25/24 0715   BP: 92/52 90/55 99/56 107/70   Pulse: 60 60 60 60   Patient Position - Orthostatic VS: Sitting Sitting Sitting          Visual Acuity      ED Medications  Medications - No data to display    Diagnostic Studies  Results Reviewed       Procedure Component Value Units Date/Time    HS Troponin I 2hr [[id number]]  (Normal) Collected: 08/25/24 0636    Lab Status: Final result Specimen: Blood from Arm, Left Updated: 08/25/24 0706     hs TnI 2hr 5 ng/L      Delta 2hr hsTnI -1 ng/L     B-Type Natriuretic Peptide(BNP) [[id number]]  (Normal) Collected: 08/25/24 0442    Lab Status: Final result Specimen: Blood from Arm, Left Updated: 08/25/24 0536      BNP 73 pg/mL     HS Troponin 0hr (reflex protocol) [[id number]]  (Normal) Collected: 08/25/24 0442    Lab Status: Final result Specimen: Blood from Arm, Left Updated: 08/25/24 0522     hs TnI 0hr 6 ng/L     Comprehensive metabolic panel [[id number]]  (Abnormal) Collected: 08/25/24 0442    Lab Status: Final result Specimen: Blood from Arm, Left Updated: 08/25/24 0519     Sodium 133 mmol/L      Potassium 4.0 mmol/L      Chloride 102 mmol/L      CO2 21 mmol/L      ANION GAP 10 mmol/L      BUN 19 mg/dL      Creatinine 0.92 mg/dL      Glucose 237 mg/dL      Calcium 9.6 mg/dL      AST 21 U/L      ALT 20 U/L      Alkaline Phosphatase 45 U/L      Total Protein 7.4 g/dL      Albumin 4.3 g/dL      Total Bilirubin 0.53 mg/dL      eGFR 65 ml/min/1.73sq m     Narrative:      National Kidney Disease Foundation guidelines for Chronic Kidney Disease (CKD):     Stage 1 with normal or high GFR (GFR > 90 mL/min/1.73 square meters)    Stage 2 Mild CKD (GFR = 60-89 mL/min/1.73 square meters)    Stage 3A Moderate CKD (GFR = 45-59 mL/min/1.73 square meters)    Stage 3B Moderate CKD (GFR = 30-44 mL/min/1.73 square meters)    Stage 4 Severe CKD (GFR = 15-29 mL/min/1.73 square meters)    Stage 5 End Stage CKD (GFR <15 mL/min/1.73 square meters)  Note: GFR calculation is accurate only with a steady state creatinine    Magnesium [[id number]]  (Normal) Collected: 08/25/24 0442    Lab Status: Final result Specimen: Blood from Arm, Left Updated: 08/25/24 0519     Magnesium 2.0 mg/dL     Protime-INR [[id number]]  (Normal) Collected: 08/25/24 0442    Lab Status: Final result Specimen: Blood from Arm, Left Updated: 08/25/24 0512     Protime 14.4 seconds      INR 1.08    Narrative:      INR Therapeutic Range    Indication                                             INR Range      Atrial Fibrillation                                               2.0-3.0  Hypercoagulable State                                    2.0.2.3  Left Ventricular Asist Device                             2.0-3.0  Mechanical Heart Valve                                  -    Aortic(with afib, MI, embolism, HF, LA enlargement,    and/or coagulopathy)                                     2.0-3.0 (2.5-3.5)     Mitral                                                             2.5-3.5  Prosthetic/Bioprosthetic Heart Valve               2.0-3.0  Venous thromboembolism (VTE: VT, PE        2.0-3.0    APTT [[id number]]  (Normal) Collected: 08/25/24 0442    Lab Status: Final result Specimen: Blood from Arm, Left Updated: 08/25/24 0512     PTT 31 seconds     CBC and differential [[id number]]  (Abnormal) Collected: 08/25/24 0442    Lab Status: Final result Specimen: Blood from Arm, Left Updated: 08/25/24 0457     WBC 8.01 Thousand/uL      RBC 4.84 Million/uL      Hemoglobin 12.0 g/dL      Hematocrit 40.5 %      MCV 84 fL      MCH 24.8 pg      MCHC 29.6 g/dL      RDW 18.0 %      MPV 9.5 fL      Platelets 279 Thousands/uL      nRBC 0 /100 WBCs      Segmented % 57 %      Immature Grans % 0 %      Lymphocytes % 28 %      Monocytes % 9 %      Eosinophils Relative 5 %      Basophils Relative 1 %      Absolute Neutrophils 4.58 Thousands/µL      Absolute Immature Grans 0.02 Thousand/uL      Absolute Lymphocytes 2.23 Thousands/µL      Absolute Monocytes 0.70 Thousand/µL      Eosinophils Absolute 0.41 Thousand/µL      Basophils Absolute 0.07 Thousands/µL                    XR chest 2 views   ED Interpretation by Shruthi Manzanares DO (08/25 0527)   No acute findings      Final Result by Alfonso Smallwood MD (08/25 6398)      No acute cardiopulmonary disease.            Resident: BARBARA MCCORMACK I, the attending radiologist, have reviewed the images and agree with the final report above.      Workstation performed: [id number]                    Procedures  ECG 12 Lead Documentation Only    Date/Time: 8/25/2024 4:45 AM    Performed by: Shruthi Manzanares DO  Authorized by: Shruthi Manzanares DO    Indications / Diagnosis:  Chest  pain  ECG reviewed by me, the ED Provider: yes    Patient location:  ED  Previous ECG:     Comparison to cardiac monitor: Yes    Interpretation:     Interpretation: normal    Rate:     ECG rate:  60    ECG rate assessment: normal    Rhythm:     Rhythm: sinus rhythm    Ectopy:     Ectopy: none    QRS:     QRS intervals:  Normal  Conduction:     Conduction: normal    ST segments:     ST segments:  Normal  T waves:     T waves: inverted      Inverted:  V1, V2 and V3           ED Course  ED Course as of 08/27/24 1206   Sun Aug 25, 2024   0536 Patient resting comfortably on reevaluation, symptoms have not returned since being in the emergency department, her blood pressure currently 93/52 which she reports is normal for her   Tue Aug 27, 2024   1205 XR chest 2 views   1205 ECG 12 lead   1205 CBC and differential(!)   1205 Protime-INR   1205 APTT   1205 Comprehensive metabolic panel(!)   1205 HS Troponin 0hr (reflex protocol)   1205 Magnesium   1205 B-Type Natriuretic Peptide(BNP)   1205 HS Troponin I 2hr               HEART Risk Score      Flowsheet Row Most Recent Value   Heart Score Risk Calculator    History 0 Filed at: 08/25/2024 0537   ECG 0 Filed at: 08/25/2024 0537   Age 2 Filed at: 08/25/2024 0537   Risk Factors 2 Filed at: 08/25/2024 0537   Troponin 0 Filed at: 08/25/2024 0537   HEART Score 4 Filed at: 08/25/2024 0537                          SBIRT 22yo+      Flowsheet Row Most Recent Value   Initial Alcohol Screen: US AUDIT-C     1. How often do you have a drink containing alcohol? 0 Filed at: 08/25/2024 0436   2. How many drinks containing alcohol do you have on a typical day you are drinking?  0 Filed at: 08/25/2024 0436   3b. FEMALE Any Age, or MALE 65+: How often do you have 4 or more drinks on one occassion? 0 Filed at: 08/25/2024 0436   Audit-C Score 0 Filed at: 08/25/2024 0436   CHRIS: How many times in the past year have you...    Used an illegal drug or used a prescription medication for non-medical  reasons? Never Filed at: 08/25/2024 0436                      Medical Decision Making  Exam without evidence of volume overload so doubt heart failure.  EKG without signs of active ischemia.  Given the timing of pain to ER presentation single/delta troponin negative so doubt NSTEMI.  Presentation not consistent with acute PE, pneumothorax (CXR negative), thoracic aortic dissection, pericarditis, tamponade, pneumonia (no signs of infection, CXR negative), myocarditis. Plan to discharge patient home with PMD follow-up. (no recent illness, Trop negative thus far)  Patient endorsed to Dr. Allen pending delta troponin and disposition    Problems Addressed:  Chest pain: acute illness or injury    Amount and/or Complexity of Data Reviewed  Labs: ordered. Decision-making details documented in ED Course.  Radiology: ordered and independent interpretation performed. Decision-making details documented in ED Course.  ECG/medicine tests: ordered and independent interpretation performed. Decision-making details documented in ED Course.    Risk  OTC drugs.  Prescription drug management.                 Disposition  Final diagnoses:   Chest pain     Time reflects when diagnosis was documented in both MDM as applicable and the Disposition within this note       Time User Action Codes Description Comment    8/25/2024  7:13 AM Alexx Allen Add [R07.9] Chest pain           ED Disposition       ED Disposition   Discharge    Condition   Stable    Date/Time   Sun Aug 25, 2024 0703    Comment   Vesna Langston discharge to home/self care.                   Follow-up Information       Follow up With Specialties Details Why Contact Info    Brandon Pete MD Family Medicine   [address] [facility] [address] [phone]              Discharge Medication List as of 8/25/2024  7:13 AM        CONTINUE these medications which have NOT CHANGED    Details   albuterol (ProAir HFA) 90 mcg/act inhaler Inhale 2 puffs every 6  (six) hours as needed for wheezing, Starting Wed 5/1/2024, Normal      amiodarone 200 mg tablet Take 1 tablet (200 mg total) by mouth daily, Starting Wed 6/19/2024, Until Tue 9/17/2024, Normal      apixaban (ELIQUIS) 5 mg Take 1 tablet (5 mg total) by mouth 2 (two) times a day, Starting Tue 7/9/2024, Until Mon 10/7/2024, Normal      atorvastatin (LIPITOR) 80 mg tablet TAKE 1 TABLET BY MOUTH EVERY EVENING, Starting Thu 8/1/2024, Normal      clopidogrel (PLAVIX) 75 mg tablet Take 1 tablet (75 mg total) by mouth daily, Starting Fri 7/5/2024, Until Wed 1/1/2025, Normal      gabapentin (Neurontin) 100 mg capsule Take 1 capsule (100 mg total) by mouth 2 (two) times a day, Starting Thu 3/28/2024, No Print      HYDROcodone-acetaminophen (NORCO) 5-325 mg per tablet Take 1 tablet by mouth every 6 (six) hours as needed for pain for up to 10 doses Max Daily Amount: 4 tablets, Starting Sun 5/12/2024, Normal      levothyroxine (Synthroid) 75 mcg tablet Take 1 tablet (75 mcg total) by mouth daily, Starting Thu 8/15/2024, Normal      meclizine (ANTIVERT) 25 mg tablet Take 1 tablet (25 mg total) by mouth every 8 (eight) hours as needed for dizziness, Starting Sat 9/11/2021, Normal      metoprolol succinate (TOPROL-XL) 25 mg 24 hr tablet Take 2 tablets (50 mg total) by mouth daily at bedtime, Starting Thu 5/30/2024, Until Fri 11/21/2025, Normal      Multiple Vitamin (MULTIVITAMIN) capsule Take 1 capsule by mouth daily, Historical Med      pantoprazole (PROTONIX) 40 mg tablet TAKE 1 TABLET BY MOUTH 2 TIMES A DAY BEFORE MEALS., Starting Sun 8/4/2024, Normal      spironolactone (ALDACTONE) 25 mg tablet Take 1 tablet (25 mg total) by mouth daily, Starting Thu 5/30/2024, Until Fri 11/21/2025, Normal      escitalopram (LEXAPRO) 10 mg tablet Take 1 tablet (10 mg total) by mouth daily, Starting Wed 5/1/2024, Until Mon 10/28/2024, Normal      furosemide (LASIX) 20 mg tablet TAKE 1 TABLET (20 MG TOTAL) BY MOUTH AS NEEDED (FOR RAPID WEIGHT GAIN  (3+ LBS OVERNIGHT OR 5+ LBS IN 5-7 DAYS) OR WORSENING SHORTNESS OF BREATH), Starting Tue 7/30/2024, Normal      sacubitril-valsartan (Entresto) 24-26 MG TABS Take 1 tablet by mouth 2 (two) times a day, Starting Mon 5/6/2024, Until Tue 10/28/2025, Normal             No discharge procedures on file.    PDMP Review         Value Time User    PDMP Reviewed  Yes 4/1/2024  1:34 AM Pio Rangel MD            ED Provider  Electronically Signed by             Shruthi Manzanares DO  08/27/24 2779

## 2024-08-25 NOTE — TELEPHONE ENCOUNTER
Reason for Disposition   Patient already left for the hospital/clinic.    Answer Assessment - Initial Assessment Questions  Patient wants provider aware she is going to [facility] ED due to chest discomfort. No additional information provided.    Protocols used: No Contact or Duplicate Contact Call-ADULT-AH

## 2024-08-26 ENCOUNTER — APPOINTMENT (OUTPATIENT)
Dept: CARDIAC REHAB | Facility: CLINIC | Age: 64
End: 2024-08-26
Payer: COMMERCIAL

## 2024-08-26 ENCOUNTER — TELEPHONE (OUTPATIENT)
Age: 64
End: 2024-08-26

## 2024-08-26 LAB
ATRIAL RATE: 60 BPM
P AXIS: 31 DEGREES
PR INTERVAL: 166 MS
QRS AXIS: 51 DEGREES
QRSD INTERVAL: 92 MS
QT INTERVAL: 496 MS
QTC INTERVAL: 496 MS
T WAVE AXIS: 82 DEGREES
VENTRICULAR RATE: 60 BPM

## 2024-08-26 PROCEDURE — 93010 ELECTROCARDIOGRAM REPORT: CPT | Performed by: INTERNAL MEDICINE

## 2024-08-26 NOTE — TELEPHONE ENCOUNTER
Pt was in [facility] ED for chest pressure, pt denies any symptoms this morning except fatigue, but advised to f/u with cardiology ASAP.     121/88 this morning   Pt took a meclizine this morning for dizziness, nausea    Pt scheduled for an OV tomorrow and will return to ED if symptoms return

## 2024-08-27 ENCOUNTER — OFFICE VISIT (OUTPATIENT)
Dept: CARDIOLOGY CLINIC | Facility: CLINIC | Age: 64
End: 2024-08-27
Payer: COMMERCIAL

## 2024-08-27 ENCOUNTER — TELEPHONE (OUTPATIENT)
Dept: CARDIOLOGY CLINIC | Facility: CLINIC | Age: 64
End: 2024-08-27

## 2024-08-27 VITALS
DIASTOLIC BLOOD PRESSURE: 76 MMHG | BODY MASS INDEX: 32.13 KG/M2 | OXYGEN SATURATION: 99 % | HEART RATE: 67 BPM | WEIGHT: 212 LBS | HEIGHT: 68 IN | SYSTOLIC BLOOD PRESSURE: 120 MMHG

## 2024-08-27 DIAGNOSIS — I50.22 CHRONIC HFREF (HEART FAILURE WITH REDUCED EJECTION FRACTION) (HCC): Primary | ICD-10-CM

## 2024-08-27 DIAGNOSIS — I50.20 HFREF (HEART FAILURE WITH REDUCED EJECTION FRACTION) (HCC): ICD-10-CM

## 2024-08-27 DIAGNOSIS — E66.01 OBESITY, MORBID (HCC): ICD-10-CM

## 2024-08-27 DIAGNOSIS — I25.5 ISCHEMIC CARDIOMYOPATHY: ICD-10-CM

## 2024-08-27 DIAGNOSIS — I25.10 CORONARY ARTERY DISEASE INVOLVING NATIVE CORONARY ARTERY OF NATIVE HEART WITHOUT ANGINA PECTORIS: ICD-10-CM

## 2024-08-27 DIAGNOSIS — R55 SYNCOPE AND COLLAPSE: ICD-10-CM

## 2024-08-27 DIAGNOSIS — I48.92 ATRIAL FLUTTER, PAROXYSMAL (HCC): ICD-10-CM

## 2024-08-27 DIAGNOSIS — I25.118 CORONARY ARTERY DISEASE OF NATIVE HEART WITH STABLE ANGINA PECTORIS, UNSPECIFIED VESSEL OR LESION TYPE (HCC): ICD-10-CM

## 2024-08-27 PROCEDURE — 99214 OFFICE O/P EST MOD 30 MIN: CPT | Performed by: STUDENT IN AN ORGANIZED HEALTH CARE EDUCATION/TRAINING PROGRAM

## 2024-08-27 RX ORDER — FUROSEMIDE 20 MG
20 TABLET ORAL DAILY
Qty: 30 TABLET | Refills: 17 | Status: SHIPPED | OUTPATIENT
Start: 2024-08-27 | End: 2026-02-18

## 2024-08-27 RX ORDER — ISOSORBIDE MONONITRATE 30 MG/1
30 TABLET, EXTENDED RELEASE ORAL DAILY
Qty: 30 TABLET | Refills: 17 | Status: SHIPPED | OUTPATIENT
Start: 2024-08-27 | End: 2026-02-18

## 2024-08-27 RX ORDER — AMIODARONE HYDROCHLORIDE 200 MG/1
200 TABLET ORAL DAILY
Qty: 30 TABLET | Refills: 0 | Status: SHIPPED | OUTPATIENT
Start: 2024-08-27 | End: 2024-11-25

## 2024-08-27 RX ORDER — SACUBITRIL AND VALSARTAN 49; 51 MG/1; MG/1
1 TABLET, FILM COATED ORAL 2 TIMES DAILY
Qty: 60 TABLET | Refills: 17 | Status: SHIPPED | OUTPATIENT
Start: 2024-08-27 | End: 2026-02-18

## 2024-08-27 NOTE — PROGRESS NOTES
"Advanced Heart Failure/Pulmonary Hypertension Outpatient Note - Vesna Langston 64 y.o. female MRN: [id number]    @ Encounter: [id number]    Assessment:  64 y.o. female PMH and acute problems listed later in this note (a partial list may also be included within 'assessment' section) p/w HF fu.  I first met Vesna Langston on 5/6/24.    Primarily ICM, HFrEF, LVEF 30-35%, nondilated LV  LHC 03/22/2024: received PCI to 100% stenosis of ostial/proximal LAD.   cMRI 03/26/2024: LVEF 20%. LVIDd 4.5 cm. \"Transmural scarring of the mid anterior, anteroseptal and inferoseptal walls, the apical anterior, septal and inferior walls and the apex.\"   Coronary artery disease  S/p MI in 03/2024; received PCI to 100% stenosis of ostial/proximal LAD.  History of monomorphic ventricular tachycardia              Per EP notes, felt to be scar mediated.               S/p secondary prevention ICD.               Continues on amiodarone per EP.   Atrial flutter, paroxysmal               [id number] = 5 (age, sex, HF, CAD, DM).              Anticoagulation on Eliquis.               Rhythm control: amiodarone per EP.  Hyperlipidemia  Diabetes mellitus, type II  Chronic back pain  History of tobacco abuse  4/7/24 CT: IMPRESSION:  Suspected small hemorrhage from the anterior wall of the distal gastric body with focal wall thickening. Bleeding due to underlying lesion or PUD is suspected. Recommend endoscopic correlation.  Past heavy NSAID use, 2024 stopped  incidental right lower lobe subsegmental pulmonary embolism 5/2024  Works in [employer], no heavy lifting she says      I have reviewed all pertinent patient data including but not limited to:        Lab Units 08/25/24  0442 08/08/24  1604 06/13/24  0712   CREATININE mg/dL 0.92 0.93 0.75     Results from last 7 days   Lab Units 08/25/24  0442   CREATININE mg/dL 0.92     Lab Results   Component Value Date    K 4.0 08/25/2024     Lab Results   Component Value Date    HGBA1C 6.7 (H) 06/25/2024     Lab " "Results   Component Value Date    [id number] 7.815 (H) 08/08/2024     Lab Results   Component Value Date    LDLCALC 70 04/22/2024     Lab Results   Component Value Date    BNP 73 08/25/2024      No results found for: \"NTBNP\"       TODAY'S PLAN:     08/27/24  Warm, euvolemic  Has increased lasix 20 prn to qdaily standing for past 1 week for increased weight, mild sob, mild edema of feet - no major improvement.  Today is last minute urgent visit for ED visit 8/25 for CP in setting of home  transiently, ACS ruled out. Then 8/26 yesterday had episode of vomiting, faintness, no LOC, no chest pain, has felt better since then. No ICD shocks, no palpitations. Did not improve with meclizine. Normal-higher blood sugars at home recently/during events.  Very rare opioid use she says for her back pain issues, none since 1-2 weeks ago    Trial imdur for any component of angina  Then 1 week later up entresto and fu BMP afterwards; this will also help with volume management  Cw lasix 20 qd standing for now, which is recent increase from prior    Messaged EP team for ICD interrogation now    Close monitoring of amio in case any contribution    Strict RTC precautions     Gdmt below  Limited by hypotension though appears to have some room  Changes as above  Fu echo 10/2024 on max gdmt, ordered previously   Has ICD for VT    On amio per EP    On AC+plavix  On high intens statin    Deranged Thyroids and DM per PCP    Follow up:  With me in 3 weeks or sooner if symptoms evolve  In addition to follow up with their other medical providers    Key info from my prior notes:    Follows GI    Discussed therapeutic lifestyle changes, low-moderate intensity exercise, maintain acceptable hydration 64 oz water daily, salt restrict, Mediterranean vs DASH diet, weight loss  Avoid nsaids    Further review of return to work next week  Safest plan would be no driving x 3 mo - resume 6/2024 if remains stable    Pharmacotherapies / Neurohormonal " Blockade:  --Beta Blocker: metoprolol succinate 50 qd  --ARNi / ACEi / ARB: entresto 24/26 bid > 49/51 bid  --Aldosterone Antagonist: aldactone 25 qd  --SGLT2 Inhibitor: jardiance 25 qd for HF and DM > subsequently Sglt2i stopped in 2024 2/2 yeast infx    --Diuretic: PRN Lasix 20 mg > 20 qd standing  Long discussion about evidence of worsening HF, when to self uptitrate home diuretic and call cardiology office     Sudden Cardiac Death Risk Reduction:  --Medtronic dual chamber ICD in situ since 03/2024 (secondary prevention).   8/1/24  MDT DUAL ICD (MVP ON) / ACTIVE SYSTEM IS MRI CONDITIONAL   DEVICE INTERROGATED IN THE [facility] OFFICE. BATTERY VOLTAGE ADEQUATE(12.4 YRS). AP 21%  <0.1% ALL LEAD PARAMETERS WITHIN NORMAL LIMITS. NO NEW SIGNIFICANT HIGH RATE EPISODES. OPTI-VOL WITHIN NORMAL LIMITS. NO PROGRAMMING CHANGES MADE TO DEVICE PARAMETERS. NORMAL DEVICE FUNCTION.   Electrical Resynchronization:  --Candidacy for BiV device: narrow QRS.      Advanced Therapies: Will continue to monitor.    Studies:  I have reviewed all pertinent patient data/labs/imaging where available, including but not limited to the below studies. Selected results may be displayed here but comprehensive listing is omitted for note clarity and can be found in the epic chart.    ECG.    Echo.    Stress.    Cath.    HPI:   64 y.o. female PMH and acute problems listed later in this note (a partial list may also be included within 'assessment' section) p/w HF fu.  I first met Vesna Langston on 5/6/24.  No new CP/SOB/dizziness/palpitations/syncope.  No new fatigue.  No new unintentional weight changes.  No new leg swelling, PND, pillow orthopnea.  No new fevers, chills, cough, nausea, vomiting, diarrhea, dysuria.      Interval History:  As noted in 'plan' section above and prior epic chart notes.    No new palpitations/syncope.  No new PND, pillow orthopnea.  No new fevers, chills, cough, diarrhea, dysuria.    Past Medical History:   Diagnosis Date     Acute respiratory insufficiency 03/22/2024    Allergic     Chronic HFrEF (heart failure with reduced ejection fraction) ([facility])     Coronary artery disease     COVID-19 virus infection 11/28/2022    Diabetes mellitus ([facility])     Disease of thyroid gland 4/09/2024    Hyperlipidemia     Hypoglycemia     ICD (implantable cardioverter-defibrillator) in place     Ischemic cardiomyopathy     Lactic acidosis 03/22/2024    Myocardial infarction ([facility]) 3/22/2024    Otitis media 1966    ST elevation myocardial infarction involving left anterior descending (LAD) coronary artery ([facility]) 03/22/2024    Tobacco abuse     Visual impairment 1967     Patient Active Problem List    Diagnosis Date Noted    Obesity, morbid ([facility]) 08/27/2024    Ganglion cyst of finger of left hand 08/15/2024    Acute appendicitis with localized peritonitis and gangrene, without perforation or abscess 05/08/2024    Acute pulmonary embolism without acute cor pulmonale ([facility]) 05/07/2024    History of gastric ulcer 05/07/2024    Generalized anxiety disorder 05/02/2024    Anemia due to acute blood loss 04/19/2024    Acquired hypothyroidism 04/19/2024    Constipation 04/10/2024    Coffee ground emesis 04/07/2024    Type 2 diabetes mellitus, without long-term current use of insulin ([facility]) 04/07/2024    Syncope 04/01/2024    Guaiac positive stools 04/01/2024    S/P ICD (internal cardiac defibrillator) procedure 03/27/2024    Chronic low back pain 03/25/2024    HFrEF (heart failure with reduced ejection fraction) ([facility]) 03/25/2024    Ischemic cardiomyopathy 03/24/2024    Coronary artery disease involving native coronary artery of native heart 03/23/2024    S/P coronary artery stent placement 03/23/2024    Atrial flutter, paroxysmal ([facility]) 03/22/2024    Hyperlipemia 03/22/2024    New onset type 2 diabetes mellitus ([facility]) 03/22/2024    Tobacco abuse 03/22/2024    History of sustained ventricular tachycardia 03/22/2024    Back pain 07/31/2022    Sciatica of left side  2022       ROS:  10 point ROS negative except as specified in HPI/interval history    Allergies   Allergen Reactions    Shellfish-Derived Products - Food Allergy Anaphylaxis    Azithromycin Rash     Current Outpatient Medications   Medication Instructions    albuterol (ProAir HFA) 90 mcg/act inhaler 2 puffs, Inhalation, Every 6 hours PRN    amiodarone 200 mg, Oral, Daily    apixaban (ELIQUIS) 5 mg, Oral, 2 times daily    atorvastatin (LIPITOR) 80 mg, Oral, Every evening    clopidogrel (PLAVIX) 75 mg, Oral, Daily    furosemide (LASIX) 20 mg, Oral, Daily    gabapentin (NEURONTIN) 100 mg, Oral, 2 times daily    HYDROcodone-acetaminophen (NORCO) 5-325 mg per tablet 1 tablet, Oral, Every 6 hours PRN    isosorbide mononitrate (IMDUR) 30 mg, Oral, Daily    levothyroxine (SYNTHROID) 75 mcg, Oral, Daily    meclizine (ANTIVERT) 25 mg, Oral, Every 8 hours PRN    metoprolol succinate (TOPROL-XL) 50 mg, Oral, Daily at bedtime    Multiple Vitamin (MULTIVITAMIN) capsule 1 capsule, Daily    pantoprazole (PROTONIX) 40 mg, Oral, 2 times daily before meals    sacubitril-valsartan (Entresto) 49-51 MG TABS 1 tablet, Oral, 2 times daily    spironolactone (ALDACTONE) 25 mg, Oral, Daily      Social History     Socioeconomic History    Marital status:      Spouse name: Not on file    Number of children: 3    Years of education: Not on file    Highest education level: Not on file   Occupational History    Not on file   Tobacco Use    Smoking status: Former     Current packs/day: 0.00     Average packs/day: 1 pack/day for 24.2 years (24.2 ttl pk-yrs)     Types: Cigarettes     Start date: 2000     Quit date: 3/22/2024     Years since quittin.4    Smokeless tobacco: Never    Tobacco comments:     Smoked 0.5-1 ppd; quit in 2024.    Vaping Use    Vaping status: Never Used   Substance and Sexual Activity    Alcohol use: Not Currently     Alcohol/week: 1.0 standard drink of alcohol     Types: 1 Shots of liquor per week      Comment: rarely    Drug use: Never    Sexual activity: Not Currently     Partners: Female     Birth control/protection: Post-menopausal   Other Topics Concern    Not on file   Social History Narrative    Not on file     Social Determinants of Health     Financial Resource Strain: Low Risk  (2/20/2023)    Received from [facility], [facility]    Overall Financial Resource Strain (CARDIA)     Difficulty of Paying Living Expenses: Not hard at all   Food Insecurity: No Food Insecurity (4/8/2024)    Hunger Vital Sign     Worried About Running Out of Food in the Last Year: Never true     Ran Out of Food in the Last Year: Never true   Transportation Needs: No Transportation Needs (4/8/2024)    PRAPARE - Transportation     Lack of Transportation (Medical): No     Lack of Transportation (Non-Medical): No   Physical Activity: Not on file   Stress: No Stress Concern Present (2/20/2023)    Received from [facility], [facility]    [place] [employer] - Occupational Stress Questionnaire     Feeling of Stress : Not at all   Social Connections: Unknown (6/18/2024)    Received from [facility]    Social Connections     How often do you feel lonely or isolated from those around you? (Adult - for ages 18 years and over): Not on file   Intimate Partner Violence: Not At Risk (2/20/2023)    Received from [facility], [facility]    Humiliation, Afraid, Rape, and Kick questionnaire     Fear of Current or Ex-Partner: No     Emotionally Abused: No     Physically Abused: No     Sexually Abused: No   Housing Stability: High Risk (4/8/2024)    Housing Stability Vital Sign     Unable to Pay for Housing in the Last Year: No     Number of Times Moved in the Last Year: 2     Homeless in the Last Year: No     Family History   Adopted: Yes   Problem Relation Age of Onset    Depression Mother     Heart disease Father     OCD  "Daughter        Physical Exam:  Vitals:    08/27/24 1057   BP: 120/76   BP Location: Left arm   Patient Position: Sitting   Cuff Size: Large   Pulse: 67   SpO2: 99%   Weight: 96.2 kg (212 lb)   Height: 5' 8\" (1.727 m)     Constitutional: NAD, non toxic  Ears/nose/mouth/throat: atraumatic  CV: RRR, nl S1S2, no murmurs/rubs/gallups, no JVD, no HJR  Resp: CTABL  GI: Soft, NTND  MSK: no swollen joints in exposed areas  Extr: No edema, warm LE  Pysche: Normal affect  Neuro: appropriate in conversation  Skin: dry and intact in exposed areas    Labs & Results:  Lab Results   Component Value Date    SODIUM 133 (L) 08/25/2024    K 4.0 08/25/2024     08/25/2024    CO2 21 08/25/2024    BUN 19 08/25/2024    CREATININE 0.92 08/25/2024    GLUC 237 (H) 08/25/2024    CALCIUM 9.6 08/25/2024     Lab Results   Component Value Date    WBC 8.01 08/25/2024    HGB 12.0 08/25/2024    HCT 40.5 08/25/2024    MCV 84 08/25/2024     08/25/2024       Counseling / Coordination of Care  Greater than 50% of total time was spent with the patient and / or family counseling and / or coordination of care.  We discussed diagnoses, most recent studies, tests and any changes in treatment plan.    Thank you for the opportunity to participate in the care of this patient.    Vitor Bell MD  Attending Physician  Advanced Heart Failure and Transplant Cardiology  [facility]  "

## 2024-08-28 ENCOUNTER — TELEPHONE (OUTPATIENT)
Dept: CARDIOLOGY CLINIC | Facility: CLINIC | Age: 64
End: 2024-08-28

## 2024-08-28 ENCOUNTER — APPOINTMENT (OUTPATIENT)
Dept: CARDIAC REHAB | Facility: CLINIC | Age: 64
End: 2024-08-28
Payer: COMMERCIAL

## 2024-08-29 ENCOUNTER — CLINICAL SUPPORT (OUTPATIENT)
Dept: CARDIAC REHAB | Facility: CLINIC | Age: 64
End: 2024-08-29
Payer: COMMERCIAL

## 2024-08-29 DIAGNOSIS — I50.22 CHRONIC SYSTOLIC HEART FAILURE (HCC): Primary | ICD-10-CM

## 2024-08-29 PROCEDURE — 93798 PHYS/QHP OP CAR RHAB W/ECG: CPT

## 2024-08-30 ENCOUNTER — APPOINTMENT (OUTPATIENT)
Dept: CARDIAC REHAB | Facility: CLINIC | Age: 64
End: 2024-08-30
Payer: COMMERCIAL

## 2024-09-04 ENCOUNTER — TELEPHONE (OUTPATIENT)
Dept: CARDIOLOGY CLINIC | Facility: CLINIC | Age: 64
End: 2024-09-04

## 2024-09-04 ENCOUNTER — CLINICAL SUPPORT (OUTPATIENT)
Dept: CARDIAC REHAB | Facility: CLINIC | Age: 64
End: 2024-09-04
Payer: COMMERCIAL

## 2024-09-04 DIAGNOSIS — I50.22 CHRONIC SYSTOLIC HEART FAILURE (HCC): Primary | ICD-10-CM

## 2024-09-04 PROCEDURE — 93798 PHYS/QHP OP CAR RHAB W/ECG: CPT

## 2024-09-04 NOTE — TELEPHONE ENCOUNTER
Pt is scheduled for Egd on 11/7/24. [facility] would like a  day hold on Eliquis and 5 day hold on Plavix prior to procedure.      Please advise

## 2024-09-05 ENCOUNTER — TELEPHONE (OUTPATIENT)
Dept: GASTROENTEROLOGY | Facility: CLINIC | Age: 64
End: 2024-09-05

## 2024-09-05 NOTE — TELEPHONE ENCOUNTER
I attempted to call patient this morning to discuss canceling upcoming EGD-  no answer  I left a voicemail instructing the patient to call the office back  Please let me know if patient returns call so I can speak with her    Otherwise I will try again later

## 2024-09-05 NOTE — TELEPHONE ENCOUNTER
Patients GI provider:  MICHAEL HIGH    Number to return call: ([phone]     Reason for call: Pt  returning call to Kassi High. Patient is requesting call back to discuss. Please return patients call.

## 2024-09-05 NOTE — TELEPHONE ENCOUNTER
I called and spoke with patient regarding EGD   I also spoke with Dr. Alvarado GI attending regarding case   We discussed procedure at length and cardiology NOT allowing Plavix hold until possibly March 2025   Patient expressed understanding to this   Patient continues to feel well and her Hgb has improved she is avoiding NSAIDs and taking PPI BID   We discussed that while it is recommended when we see gastric ulcers on EGD to repeat the procedure to ensure ulcer healing in her case since she is high risk from cardiac standpoint and feeling well with normal Hgb it would also be reasonable to hold of on repeat EGD / defer and continue to monitor symptoms   Patient expressed understanding to this   I reviewed the EGD report with the patient and we discussed that thankfully her ulcers were clean based in the setting of NSAID use and not malignant appearing which is reassuring   Patient expressed understanding to this   Given above will CANCEL upcoming EGD and she will continue PPI twice daily and avoid NSAIDs for now.  She will keep her upcoming follow-up appointment with me as scheduled in February.  We can always discuss/consider repeat EGD at this time.  Patient expressed understanding to this.  All questions were answered.  Patient was in agreement with plan.  Patient was told to call the office with any questions, concerns, new or worsening GI symptoms

## 2024-09-06 ENCOUNTER — CLINICAL SUPPORT (OUTPATIENT)
Dept: CARDIAC REHAB | Facility: CLINIC | Age: 64
End: 2024-09-06
Payer: COMMERCIAL

## 2024-09-06 DIAGNOSIS — I50.22 CHRONIC SYSTOLIC HEART FAILURE (HCC): Primary | ICD-10-CM

## 2024-09-06 PROCEDURE — 93798 PHYS/QHP OP CAR RHAB W/ECG: CPT

## 2024-09-08 ENCOUNTER — PATIENT MESSAGE (OUTPATIENT)
Dept: FAMILY MEDICINE CLINIC | Facility: CLINIC | Age: 64
End: 2024-09-08

## 2024-09-09 ENCOUNTER — APPOINTMENT (EMERGENCY)
Dept: RADIOLOGY | Facility: HOSPITAL | Age: 64
End: 2024-09-09
Payer: COMMERCIAL

## 2024-09-09 ENCOUNTER — NURSE TRIAGE (OUTPATIENT)
Age: 64
End: 2024-09-09

## 2024-09-09 ENCOUNTER — HOSPITAL ENCOUNTER (EMERGENCY)
Facility: HOSPITAL | Age: 64
Discharge: HOME | End: 2024-09-09
Attending: EMERGENCY MEDICINE
Payer: COMMERCIAL

## 2024-09-09 ENCOUNTER — CLINICAL SUPPORT (OUTPATIENT)
Dept: CARDIAC REHAB | Facility: CLINIC | Age: 64
End: 2024-09-09
Payer: COMMERCIAL

## 2024-09-09 VITALS
TEMPERATURE: 97.7 F | OXYGEN SATURATION: 97 % | HEART RATE: 62 BPM | HEIGHT: 68 IN | DIASTOLIC BLOOD PRESSURE: 55 MMHG | BODY MASS INDEX: 32.98 KG/M2 | WEIGHT: 217.59 LBS | SYSTOLIC BLOOD PRESSURE: 113 MMHG | RESPIRATION RATE: 18 BRPM

## 2024-09-09 DIAGNOSIS — R53.83 FATIGUE: ICD-10-CM

## 2024-09-09 DIAGNOSIS — R06.02 SOB (SHORTNESS OF BREATH): Primary | ICD-10-CM

## 2024-09-09 DIAGNOSIS — I50.22 CHRONIC SYSTOLIC HEART FAILURE (HCC): Primary | ICD-10-CM

## 2024-09-09 LAB
2HR DELTA HS TROPONIN: -1 NG/L
ALBUMIN SERPL BCG-MCNC: 4.6 G/DL (ref 3.5–5)
ALP SERPL-CCNC: 47 U/L (ref 34–104)
ALT SERPL W P-5'-P-CCNC: 26 U/L (ref 7–52)
ANION GAP SERPL CALCULATED.3IONS-SCNC: 10 MMOL/L (ref 4–13)
AST SERPL W P-5'-P-CCNC: 21 U/L (ref 13–39)
BASE EX.OXY STD BLDV CALC-SCNC: 90.3 % (ref 60–80)
BASE EXCESS BLDV CALC-SCNC: 2.3 MMOL/L
BASOPHILS # BLD AUTO: 0.06 THOUSANDS/ÂΜL (ref 0–0.1)
BASOPHILS NFR BLD AUTO: 1 % (ref 0–1)
BILIRUB SERPL-MCNC: 0.53 MG/DL (ref 0.2–1)
BILIRUB UR QL STRIP: NEGATIVE
BNP SERPL-MCNC: 82 PG/ML (ref 0–100)
BUN SERPL-MCNC: 21 MG/DL (ref 5–25)
CALCIUM SERPL-MCNC: 9.7 MG/DL (ref 8.4–10.2)
CARDIAC TROPONIN I PNL SERPL HS: 12 NG/L
CARDIAC TROPONIN I PNL SERPL HS: 13 NG/L
CHLORIDE SERPL-SCNC: 98 MMOL/L (ref 96–108)
CLARITY UR: CLEAR
CO2 SERPL-SCNC: 28 MMOL/L (ref 21–32)
COLOR UR: YELLOW
CREAT SERPL-MCNC: 1.06 MG/DL (ref 0.6–1.3)
EOSINOPHIL # BLD AUTO: 0.41 THOUSAND/ÂΜL (ref 0–0.61)
EOSINOPHIL NFR BLD AUTO: 5 % (ref 0–6)
ERYTHROCYTE [DISTWIDTH] IN BLOOD BY AUTOMATED COUNT: 18.2 % (ref 11.6–15.1)
FLUAV RNA RESP QL NAA+PROBE: NEGATIVE
FLUBV RNA RESP QL NAA+PROBE: NEGATIVE
GFR SERPL CREATININE-BSD FRML MDRD: 54 ML/MIN/1.73SQ M
GLUCOSE SERPL-MCNC: 148 MG/DL (ref 65–140)
GLUCOSE UR STRIP-MCNC: NEGATIVE MG/DL
HCO3 BLDV-SCNC: 26.8 MMOL/L (ref 24–30)
HCT VFR BLD AUTO: 38.2 % (ref 34.8–46.1)
HGB BLD-MCNC: 12 G/DL (ref 11.5–15.4)
HGB UR QL STRIP.AUTO: NEGATIVE
IMM GRANULOCYTES # BLD AUTO: 0.03 THOUSAND/UL (ref 0–0.2)
IMM GRANULOCYTES NFR BLD AUTO: 0 % (ref 0–2)
KETONES UR STRIP-MCNC: NEGATIVE MG/DL
LACTATE SERPL-SCNC: 1.1 MMOL/L (ref 0.5–2)
LEUKOCYTE ESTERASE UR QL STRIP: NEGATIVE
LYMPHOCYTES # BLD AUTO: 2.31 THOUSANDS/ÂΜL (ref 0.6–4.47)
LYMPHOCYTES NFR BLD AUTO: 27 % (ref 14–44)
MCH RBC QN AUTO: 25.7 PG (ref 26.8–34.3)
MCHC RBC AUTO-ENTMCNC: 31.4 G/DL (ref 31.4–37.4)
MCV RBC AUTO: 82 FL (ref 82–98)
MONOCYTES # BLD AUTO: 0.97 THOUSAND/ÂΜL (ref 0.17–1.22)
MONOCYTES NFR BLD AUTO: 11 % (ref 4–12)
NEUTROPHILS # BLD AUTO: 4.77 THOUSANDS/ÂΜL (ref 1.85–7.62)
NEUTS SEG NFR BLD AUTO: 56 % (ref 43–75)
NITRITE UR QL STRIP: NEGATIVE
NRBC BLD AUTO-RTO: 0 /100 WBCS
O2 CT BLDV-SCNC: 16.4 ML/DL
PCO2 BLDV: 41.2 MM HG (ref 42–50)
PH BLDV: 7.43 [PH] (ref 7.3–7.4)
PH UR STRIP.AUTO: 6 [PH]
PLATELET # BLD AUTO: 295 THOUSANDS/UL (ref 149–390)
PMV BLD AUTO: 9.8 FL (ref 8.9–12.7)
PO2 BLDV: 65.6 MM HG (ref 35–45)
POTASSIUM SERPL-SCNC: 3.9 MMOL/L (ref 3.5–5.3)
PROT SERPL-MCNC: 7.7 G/DL (ref 6.4–8.4)
PROT UR STRIP-MCNC: NEGATIVE MG/DL
RBC # BLD AUTO: 4.67 MILLION/UL (ref 3.81–5.12)
RSV RNA RESP QL NAA+PROBE: NEGATIVE
SARS-COV-2 RNA RESP QL NAA+PROBE: NEGATIVE
SODIUM SERPL-SCNC: 136 MMOL/L (ref 135–147)
SP GR UR STRIP.AUTO: 1.01 (ref 1–1.03)
UROBILINOGEN UR QL STRIP.AUTO: 0.2 E.U./DL
WBC # BLD AUTO: 8.55 THOUSAND/UL (ref 4.31–10.16)

## 2024-09-09 PROCEDURE — 99285 EMERGENCY DEPT VISIT HI MDM: CPT | Performed by: PHYSICIAN ASSISTANT

## 2024-09-09 PROCEDURE — 81003 URINALYSIS AUTO W/O SCOPE: CPT | Performed by: PHYSICIAN ASSISTANT

## 2024-09-09 PROCEDURE — 71045 X-RAY EXAM CHEST 1 VIEW: CPT

## 2024-09-09 PROCEDURE — 84484 ASSAY OF TROPONIN QUANT: CPT | Performed by: PHYSICIAN ASSISTANT

## 2024-09-09 PROCEDURE — 82805 BLOOD GASES W/O2 SATURATION: CPT | Performed by: PHYSICIAN ASSISTANT

## 2024-09-09 PROCEDURE — 93798 PHYS/QHP OP CAR RHAB W/ECG: CPT

## 2024-09-09 PROCEDURE — 0241U HB NFCT DS VIR RESP RNA 4 TRGT: CPT | Performed by: PHYSICIAN ASSISTANT

## 2024-09-09 PROCEDURE — 83880 ASSAY OF NATRIURETIC PEPTIDE: CPT | Performed by: PHYSICIAN ASSISTANT

## 2024-09-09 PROCEDURE — 85025 COMPLETE CBC W/AUTO DIFF WBC: CPT | Performed by: PHYSICIAN ASSISTANT

## 2024-09-09 PROCEDURE — 99285 EMERGENCY DEPT VISIT HI MDM: CPT

## 2024-09-09 PROCEDURE — 36415 COLL VENOUS BLD VENIPUNCTURE: CPT | Performed by: PHYSICIAN ASSISTANT

## 2024-09-09 PROCEDURE — 83605 ASSAY OF LACTIC ACID: CPT | Performed by: PHYSICIAN ASSISTANT

## 2024-09-09 PROCEDURE — 93005 ELECTROCARDIOGRAM TRACING: CPT

## 2024-09-09 PROCEDURE — 80053 COMPREHEN METABOLIC PANEL: CPT | Performed by: PHYSICIAN ASSISTANT

## 2024-09-09 RX ORDER — DILTIAZEM HYDROCHLORIDE 5 MG/ML
10 INJECTION INTRAVENOUS ONCE
Status: DISCONTINUED | OUTPATIENT
Start: 2024-09-09 | End: 2024-09-09

## 2024-09-09 RX ORDER — FUROSEMIDE 10 MG/ML
40 INJECTION INTRAMUSCULAR; INTRAVENOUS ONCE
Status: DISCONTINUED | OUTPATIENT
Start: 2024-09-09 | End: 2024-09-09

## 2024-09-09 NOTE — ED PROVIDER NOTES
"History  Chief Complaint   Patient presents with    Shortness of Breath     Pt presented to this ED c/o sob w/dizziness and \"back pressure\" starting this morning. Pt mentioned episodes of vomiting yesterday. Recent med changes past 2wks. Hx MI. Denies travel/fevers/cough/diarrhea.     Patient is a 64-year-old female who presents emerged from today with a chief complaint of shortness of breath and dizziness with episodes of nausea vomiting.  The patient states that she awoke yesterday morning prior to going into work and she had some nausea and vomiting with diarrhea.  She states that she overall did not feel very well.  She states that she had intermittent dizziness.  She states that she felt more short of breath over the last 2 days.  He states it is worse when she is up and moving and better with rest.          Prior to Admission Medications   Prescriptions Last Dose Informant Patient Reported? Taking?   HYDROcodone-acetaminophen (NORCO) 5-325 mg per tablet  Self No No   Sig: Take 1 tablet by mouth every 6 (six) hours as needed for pain for up to 10 doses Max Daily Amount: 4 tablets   Multiple Vitamin (MULTIVITAMIN) capsule  Self Yes No   Sig: Take 1 capsule by mouth daily   Patient not taking: Reported on 8/27/2024   albuterol (ProAir HFA) 90 mcg/act inhaler  Self No No   Sig: Inhale 2 puffs every 6 (six) hours as needed for wheezing   amiodarone 200 mg tablet   No No   Sig: Take 1 tablet (200 mg total) by mouth daily   apixaban (ELIQUIS) 5 mg  Self No No   Sig: Take 1 tablet (5 mg total) by mouth 2 (two) times a day   atorvastatin (LIPITOR) 80 mg tablet  Self No No   Sig: TAKE 1 TABLET BY MOUTH EVERY EVENING   clopidogrel (PLAVIX) 75 mg tablet  Self No No   Sig: Take 1 tablet (75 mg total) by mouth daily   furosemide (LASIX) 20 mg tablet   No No   Sig: Take 1 tablet (20 mg total) by mouth daily   gabapentin (Neurontin) 100 mg capsule  Self No No   Sig: Take 1 capsule (100 mg total) by mouth 2 (two) times a day "   Patient taking differently: Take 100 mg by mouth 3 (three) times a day   isosorbide mononitrate (IMDUR) 30 mg 24 hr tablet   No No   Sig: Take 1 tablet (30 mg total) by mouth daily   levothyroxine (Synthroid) 75 mcg tablet  Self No No   Sig: Take 1 tablet (75 mcg total) by mouth daily   meclizine (ANTIVERT) 25 mg tablet  Self No No   Sig: Take 1 tablet (25 mg total) by mouth every 8 (eight) hours as needed for dizziness   metoprolol succinate (TOPROL-XL) 25 mg 24 hr tablet  Self No No   Sig: Take 2 tablets (50 mg total) by mouth daily at bedtime   pantoprazole (PROTONIX) 40 mg tablet  Self No No   Sig: TAKE 1 TABLET BY MOUTH 2 TIMES A DAY BEFORE MEALS.   sacubitril-valsartan (Entresto) 49-51 MG TABS   No No   Sig: Take 1 tablet by mouth 2 (two) times a day   spironolactone (ALDACTONE) 25 mg tablet  Self No No   Sig: Take 1 tablet (25 mg total) by mouth daily      Facility-Administered Medications: None       Past Medical History:   Diagnosis Date    Acute respiratory insufficiency 03/22/2024    Allergic     Chronic HFrEF (heart failure with reduced ejection fraction) ([facility])     Coronary artery disease     COVID-19 virus infection 11/28/2022    Diabetes mellitus (HCC)     Disease of thyroid gland 4/09/2024    Hyperlipidemia     Hypoglycemia     ICD (implantable cardioverter-defibrillator) in place     Ischemic cardiomyopathy     Lactic acidosis 03/22/2024    Myocardial infarction (HCC) 3/22/2024    Otitis media 1966    ST elevation myocardial infarction involving left anterior descending (LAD) coronary artery ([facility]) 03/22/2024    Tobacco abuse     Visual impairment 1967       Past Surgical History:   Procedure Laterality Date    ADENOIDECTOMY      APPENDECTOMY      APPENDECTOMY LAPAROSCOPIC N/A 05/08/2024    Procedure: APPENDECTOMY LAPAROSCOPIC;  Surgeon: Lauryn Ullrich, DO;  Location: [facility];  Service: General    BREAST EXCISIONAL BIOPSY Right 09/2000    cyst removed- benign    CARDIAC CATHETERIZATION N/A  2024    Procedure: Cardiac pci;  Surgeon: Gabriel Myers MD;  [facility];  Service: Cardiology    CARDIAC CATHETERIZATION N/A 2024    Procedure: Cardiac Coronary Angiogram;  Surgeon: Gabriel Myers MD;  [facility];  Service: Cardiology    CARDIAC CATHETERIZATION N/A 2024    Procedure: Cardiac PCI AMI;  Surgeon: Gabriel Myers MD;  [facility];  Service: Cardiology    CARDIAC CATHETERIZATION N/A 2024    Procedure: Cardiac IVUS;  Surgeon: Gabriel Myers MD;  [facility];  Service: Cardiology    CARDIAC ELECTROPHYSIOLOGY PROCEDURE N/A 2024    Procedure: Cardiac icd implant;  Surgeon: Jeffy Lama MD;  [facility];  Service: Cardiology     SECTION      ECTOPIC PREGNANCY SURGERY      SEPTOPLASTY      SPINE SURGERY  23    TONSILLECTOMY         Family History   Adopted: Yes   Problem Relation Age of Onset    Depression Mother     Heart disease Father     OCD Daughter      I have reviewed and agree with the history as documented.    E-Cigarette/Vaping    E-Cigarette Use Never User      E-Cigarette/Vaping Substances    Nicotine No     THC No     CBD No     Flavoring No     Other No     Unknown No      Social History     Tobacco Use    Smoking status: Former     Current packs/day: 0.00     Average packs/day: 1 pack/day for 24.2 years (24.2 ttl pk-yrs)     Types: Cigarettes     Start date: 2000     Quit date: 3/22/2024     Years since quittin.4    Smokeless tobacco: Never    Tobacco comments:     Smoked 0.5-1 ppd; quit in 2024.    Vaping Use    Vaping status: Never Used   Substance Use Topics    Alcohol use: Not Currently     Alcohol/week: 1.0 standard drink of alcohol     Types: 1 Shots of liquor per week     Comment: rarely    Drug use: Never       Review of Systems   All other systems reviewed and are negative.      Physical Exam  Physical Exam  Vitals and nursing note reviewed.    Constitutional:       General: She is not in acute distress.     Appearance: She is well-developed.   HENT:      Head: Normocephalic and atraumatic.      Right Ear: External ear normal.      Left Ear: External ear normal.   Eyes:      Pupils: Pupils are equal, round, and reactive to light.   Cardiovascular:      Rate and Rhythm: Normal rate and regular rhythm.   Pulmonary:      Effort: Pulmonary effort is normal. No respiratory distress.      Breath sounds: Normal breath sounds. No wheezing.   Abdominal:      General: Bowel sounds are normal.      Palpations: Abdomen is soft. There is no mass.      Tenderness: There is no abdominal tenderness. There is no rebound.      Hernia: No hernia is present.   Musculoskeletal:      Cervical back: Normal range of motion and neck supple.      Right lower leg: No edema.      Left lower leg: No edema.   Skin:     General: Skin is warm and dry.      Capillary Refill: Capillary refill takes less than 2 seconds.   Neurological:      General: No focal deficit present.      Mental Status: She is alert and oriented to person, place, and time.      Coordination: Coordination normal.   Psychiatric:         Behavior: Behavior normal.         Vital Signs  ED Triage Vitals   Temperature Pulse Respirations Blood Pressure SpO2   09/09/24 1716 09/09/24 1716 09/09/24 1716 09/09/24 1716 09/09/24 1716   97.7 °F (36.5 °C) 64 21 151/63 99 %      Temp src Heart Rate Source Patient Position - Orthostatic VS BP Location FiO2 (%)   -- 09/09/24 1716 09/09/24 1716 09/09/24 1716 --    Monitor Lying Right arm       Pain Score       09/09/24 1730       4           Vitals:    09/09/24 1833 09/09/24 1906 09/09/24 1930 09/09/24 2000   BP: 116/81 103/59 112/58 113/55   Pulse: 65 66 64 62   Patient Position - Orthostatic VS: Lying  Lying Sitting         Visual Acuity      ED Medications  Medications - No data to display    Diagnostic Studies  Results Reviewed       Procedure Component Value Units Date/Time     HS Troponin I 2hr [[id number]]  (Normal) Collected: 09/09/24 1932    Lab Status: Final result Specimen: Blood from Arm, Left Updated: 09/1958     hs TnI 2hr 12 ng/L      Delta 2hr hsTnI -1 ng/L     FLU/RSV/COVID - if FLU/RSV clinically relevant [[id number]]  (Normal) Collected: 09/09/24 1731    Lab Status: Final result Specimen: Nares from Nose Updated: 09/09/24 1842     SARS-CoV-2 Negative     INFLUENZA A PCR Negative     INFLUENZA B PCR Negative     RSV PCR Negative    Narrative:      This test has been performed using the CoV-2/Flu/RSV plus assay on the [employer] platform. This test has been validated by the  and verified by the performing laboratory.     This test is designed to amplify and detect the following: nucleocapsid (N), envelope (E), and RNA-dependent RNA polymerase (RdRP) genes of the SARS-CoV-2 genome; matrix (M), basic polymerase (PB2), and acidic protein (PA) segments of the influenza A genome; matrix (M) and non-structural protein (NS) segments of the influenza B genome, and the nucleocapsid genes of RSV A and RSV B.     Positive results are indicative of the presence of Flu A, Flu B, RSV, and/or SARS-CoV-2 RNA. Positive results for SARS-CoV-2 or suspected novel influenza should be reported to state, local, or federal health departments according to local reporting requirements.      All results should be assessed in conjunction with clinical presentation and other laboratory markers for clinical management.     FOR PEDIATRIC PATIENTS - copy/paste COVID Guidelines URL to browser: [email or web address]       B-Type Natriuretic Peptide(BNP) [[id number]]  (Normal) Collected: 09/09/24 1731    Lab Status: Final result Specimen: Blood from Arm, Left Updated: 09/09/24 1811     BNP 82 pg/mL     HS Troponin 0hr (reflex protocol) [[id number]]  (Normal) Collected: 09/09/24 1731    Lab Status: Final result Specimen: Blood from Arm,  Left Updated: 09/09/24 1810     hs TnI 0hr 13 ng/L     Comprehensive metabolic panel [[id number]]  (Abnormal) Collected: 09/09/24 1731    Lab Status: Final result Specimen: Blood from Arm, Left Updated: 09/09/24 1805     Sodium 136 mmol/L      Potassium 3.9 mmol/L      Chloride 98 mmol/L      CO2 28 mmol/L      ANION GAP 10 mmol/L      BUN 21 mg/dL      Creatinine 1.06 mg/dL      Glucose 148 mg/dL      Calcium 9.7 mg/dL      AST 21 U/L      ALT 26 U/L      Alkaline Phosphatase 47 U/L      Total Protein 7.7 g/dL      Albumin 4.6 g/dL      Total Bilirubin 0.53 mg/dL      eGFR 54 ml/min/1.73sq m     Narrative:      National Kidney Disease Foundation guidelines for Chronic Kidney Disease (CKD):     Stage 1 with normal or high GFR (GFR > 90 mL/min/1.73 square meters)    Stage 2 Mild CKD (GFR = 60-89 mL/min/1.73 square meters)    Stage 3A Moderate CKD (GFR = 45-59 mL/min/1.73 square meters)    Stage 3B Moderate CKD (GFR = 30-44 mL/min/1.73 square meters)    Stage 4 Severe CKD (GFR = 15-29 mL/min/1.73 square meters)    Stage 5 End Stage CKD (GFR <15 mL/min/1.73 square meters)  Note: GFR calculation is accurate only with a steady state creatinine    UA w Reflex to Microscopic w Reflex to Culture [[id number]] Collected: 09/09/24 1731    Lab Status: Final result Specimen: Urine, Clean Catch Updated: 09/09/24 1803     Color, UA Yellow     Clarity, UA Clear     Specific Gravity, UA 1.010     pH, UA 6.0     Leukocytes, UA Negative     Nitrite, UA Negative     Protein, UA Negative mg/dl      Glucose, UA Negative mg/dl      Ketones, UA Negative mg/dl      Urobilinogen, UA 0.2 E.U./dl      Bilirubin, UA Negative     Occult Blood, UA Negative    Lactic acid, plasma (w/reflex if result > 2.0) [[id number]]  (Normal) Collected: 09/09/24 1731    Lab Status: Final result Specimen: Blood from Arm, Left Updated: 09/09/24 1759     LACTIC ACID 1.1 mmol/L     Narrative:      Result may be elevated if tourniquet was used during collection.     Blood gas, venous [[id number]]  (Abnormal) Collected: 09/09/24 1731    Lab Status: Final result Specimen: Blood from Arm, Left Updated: 09/09/24 1745     pH, Keven 7.431     pCO2, Keven 41.2 mm Hg      pO2, Keven 65.6 mm Hg      HCO3, Keven 26.8 mmol/L      Base Excess, Keven 2.3 mmol/L      O2 Content, Keven 16.4 ml/dL      O2 HGB, VENOUS 90.3 %     CBC and differential [[id number]]  (Abnormal) Collected: 09/09/24 1731    Lab Status: Final result Specimen: Blood from Arm, Left Updated: 09/09/24 1742     WBC 8.55 Thousand/uL      RBC 4.67 Million/uL      Hemoglobin 12.0 g/dL      Hematocrit 38.2 %      MCV 82 fL      MCH 25.7 pg      MCHC 31.4 g/dL      RDW 18.2 %      MPV 9.8 fL      Platelets 295 Thousands/uL      nRBC 0 /100 WBCs      Segmented % 56 %      Immature Grans % 0 %      Lymphocytes % 27 %      Monocytes % 11 %      Eosinophils Relative 5 %      Basophils Relative 1 %      Absolute Neutrophils 4.77 Thousands/µL      Absolute Immature Grans 0.03 Thousand/uL      Absolute Lymphocytes 2.31 Thousands/µL      Absolute Monocytes 0.97 Thousand/µL      Eosinophils Absolute 0.41 Thousand/µL      Basophils Absolute 0.06 Thousands/µL                    XR chest 1 view portable   Final Result by Peter Strong MD (09/09 1916)      Low lung volumes. No focal airspace disease.            Workstation performed: [id number]                    Procedures  ECG 12 Lead Documentation Only    Date/Time: 9/9/2024 7:02 PM    Performed by: Ky Quinn PA-C  Authorized by: Ky Quinn PA-C    Indications / Diagnosis:  Sob  ECG reviewed by me, the ED Provider: yes    Patient location:  ED  Previous ECG:     Previous ECG:  Unavailable  Interpretation:     Interpretation: non-specific    Rate:     ECG rate:  61    ECG rate assessment: normal    Rhythm:     Rhythm: sinus rhythm             ED Course  ED Course as of 09/09/24 2107   Mon Sep 09, 2024   1813 hs TnI 0hr: 13   1920 Patient ambulated to and from the bathroom  approximately 10 to 15 feet and she returned she was approximately 94% on room air no shortness of breath or issues with ambulation                                 SBIRT 20yo+      Flowsheet Row Most Recent Value   Initial Alcohol Screen: US AUDIT-C     1. How often do you have a drink containing alcohol? 0 Filed at: 09/09/2024 1725   2. How many drinks containing alcohol do you have on a typical day you are drinking?  0 Filed at: 09/09/2024 1725   3a. Male UNDER 65: How often do you have five or more drinks on one occasion? 0 Filed at: 09/09/2024 1725   3b. FEMALE Any Age, or MALE 65+: How often do you have 4 or more drinks on one occassion? 0 Filed at: 09/09/2024 1725   Audit-C Score 0 Filed at: 09/09/2024 1725   CHRIS: How many times in the past year have you...    Used an illegal drug or used a prescription medication for non-medical reasons? Never Filed at: 09/09/2024 1725                      Medical Decision Making  Patient is a 64-year-old female who presents today with a chief complaint of shortness of breath and dizziness with episodes of nausea vomiting.  The patient states that she awoke yesterday morning prior to going into work and she had some nausea and vomiting with diarrhea.  She states that she overall did not feel very well.  She states that she had intermittent dizziness.  She states that she felt more short of breath over the last 2 days.  He states it is worse when she is up and moving and better with rest.    He states a few weeks ago she was told to start taking daily Lasix.  She states that she lives also told to increase her Entresto.  She today had several episodes where she felt lightheaded but very tired almost as though she could fall asleep denies any chest pain nausea vomiting today  Labs remarkable.  Patient had no chest pain.  Patient has appointment tomorrow with cardiology did express the importance of follow-up.  Patient's condition could be medication related or secondary due to  viral illness.  The patient will continue follow-up tomorrow.  EKG was unremarkable for any acute ischemic changes.  No acute findings on blood work.  Patient was discharged home with son and in agreement treatment plan.    Amount and/or Complexity of Data Reviewed  Labs: ordered. Decision-making details documented in ED Course.  Radiology: ordered and independent interpretation performed. Decision-making details documented in ED Course.  ECG/medicine tests: ordered and independent interpretation performed. Decision-making details documented in ED Course.                 Disposition  Final diagnoses:   SOB (shortness of breath)   Fatigue     Time reflects when diagnosis was documented in both MDM as applicable and the Disposition within this note       Time User Action Codes Description Comment    9/9/2024  8:01 PM Ky Quinn [R06.02] SOB (shortness of breath)     9/9/2024  8:01 PM Ky Quinn [R53.83] Fatigue           ED Disposition       ED Disposition   Discharge    Condition   Stable    Date/Time   Mon Sep 9, 2024 2001    Comment   Vesna Kian discharge to home/self care.                   Follow-up Information    None         Discharge Medication List as of 9/9/2024  8:01 PM        CONTINUE these medications which have NOT CHANGED    Details   albuterol (ProAir HFA) 90 mcg/act inhaler Inhale 2 puffs every 6 (six) hours as needed for wheezing, Starting Wed 5/1/2024, Normal      amiodarone 200 mg tablet Take 1 tablet (200 mg total) by mouth daily, Starting Tue 8/27/2024, Until Mon 11/25/2024, Normal      apixaban (ELIQUIS) 5 mg Take 1 tablet (5 mg total) by mouth 2 (two) times a day, Starting Tue 7/9/2024, Until Mon 10/7/2024, Normal      atorvastatin (LIPITOR) 80 mg tablet TAKE 1 TABLET BY MOUTH EVERY EVENING, Starting Thu 8/1/2024, Normal      clopidogrel (PLAVIX) 75 mg tablet Take 1 tablet (75 mg total) by mouth daily, Starting Fri 7/5/2024, Until Wed 1/1/2025, Normal      furosemide (LASIX) 20 mg  tablet Take 1 tablet (20 mg total) by mouth daily, Starting Tue 8/27/2024, Until Wed 2/18/2026, Normal      gabapentin (Neurontin) 100 mg capsule Take 1 capsule (100 mg total) by mouth 2 (two) times a day, Starting u 3/28/2024, No Print      HYDROcodone-acetaminophen (NORCO) 5-325 mg per tablet Take 1 tablet by mouth every 6 (six) hours as needed for pain for up to 10 doses Max Daily Amount: 4 tablets, Starting Sun 5/12/2024, Normal      isosorbide mononitrate (IMDUR) 30 mg 24 hr tablet Take 1 tablet (30 mg total) by mouth daily, Starting Tue 8/27/2024, Until Wed 2/18/2026, Normal      levothyroxine (Synthroid) 75 mcg tablet Take 1 tablet (75 mcg total) by mouth daily, Starting u 8/15/2024, Normal      meclizine (ANTIVERT) 25 mg tablet Take 1 tablet (25 mg total) by mouth every 8 (eight) hours as needed for dizziness, Starting Sat 9/11/2021, Normal      metoprolol succinate (TOPROL-XL) 25 mg 24 hr tablet Take 2 tablets (50 mg total) by mouth daily at bedtime, Starting Thu 5/30/2024, Until Fri 11/21/2025, Normal      Multiple Vitamin (MULTIVITAMIN) capsule Take 1 capsule by mouth daily, Historical Med      pantoprazole (PROTONIX) 40 mg tablet TAKE 1 TABLET BY MOUTH 2 TIMES A DAY BEFORE MEALS., Starting Sun 8/4/2024, Normal      sacubitril-valsartan (Entresto) 49-51 MG TABS Take 1 tablet by mouth 2 (two) times a day, Starting Tue 8/27/2024, Until Wed 2/18/2026, Normal      spironolactone (ALDACTONE) 25 mg tablet Take 1 tablet (25 mg total) by mouth daily, Starting Thu 5/30/2024, Until Fri 11/21/2025, Normal             No discharge procedures on file.    PDMP Review         Value Time User    PDMP Reviewed  Yes 4/1/2024  1:34 AM Pio Rangel MD            ED Provider  Electronically Signed by             Ky Quinn PA-C  09/09/24 8833

## 2024-09-09 NOTE — TELEPHONE ENCOUNTER
Regarding: Black Outs  ----- Message from Chelo KITCHEN sent at 9/9/2024  3:41 PM EDT -----  Patient was calling in because she has experienced 3 black outs today and she is scared. She had her first one on her way to Cardiac Rehab this morning than again on her way home in the car and just now while at the kitchen table.

## 2024-09-09 NOTE — TELEPHONE ENCOUNTER
"Patient has had 3 recent episodes of near syncope or what she describes as a feeling like she might fall asleep immediately.  BP has been as high as 166/101, and as low as 112. Her blood sugars have been \"all over the place\"  She had an episode while driving today to cardiac rehab. She had to pull over. She bought some water and felt better.  She did complete the rehab session.  She has gained 5 lbs over the last few days and said she barely eats or drinks.      I advised ER.  She agreed.  I also scheduled her for a follow up with Dr Bell tomorrow afternoon if she is discharged from the ER.        Reason for Disposition   SEVERE dizziness (e.g., unable to stand, requires support to walk, feels like passing out now)    Answer Assessment - Initial Assessment Questions  1. DESCRIPTION: \"Describe your dizziness.\"      Feel lightheaded and dizzy with nearly passing out 3 times.  2. LIGHTHEADED: \"Do you feel lightheaded?\" (e.g., somewhat faint, woozy, weak upon standing)      Yes  3. VERTIGO: \"Do you feel like either you or the room is spinning or tilting?\" (i.e. vertigo)      Denies  4. SEVERITY: \"How bad is it?\"  \"Do you feel like you are going to faint?\" \"Can you stand and walk?\"    - MILD: Feels slightly dizzy, but walking normally.    - MODERATE: Feels very unsteady when walking, but not falling; interferes with normal activities (e.g., school, work)       Patient feels like she is about to fall asleep .  She compared it to narcolepsy.  Was driving today and had to pull over   5. ONSET:  \"When did the dizziness begin?\"      Over the weekend.      8. CAUSE: \"What do you think is causing the dizziness?\"      Unsure  9  10. OTHER SYMPTOMS: \"Do you have any other symptoms?\" (e.g., fever, chest pain, vomiting, diarrhea, bleeding)        Some shortness of breath.  Pain between shoulder blades    Protocols used: Dizziness-ADULT-[place]    "

## 2024-09-10 ENCOUNTER — OFFICE VISIT (OUTPATIENT)
Dept: CARDIOLOGY CLINIC | Facility: CLINIC | Age: 64
End: 2024-09-10
Payer: COMMERCIAL

## 2024-09-10 ENCOUNTER — APPOINTMENT (OUTPATIENT)
Dept: LAB | Facility: HOSPITAL | Age: 64
End: 2024-09-10
Payer: COMMERCIAL

## 2024-09-10 VITALS
WEIGHT: 213.4 LBS | HEART RATE: 82 BPM | DIASTOLIC BLOOD PRESSURE: 74 MMHG | OXYGEN SATURATION: 97 % | HEIGHT: 68 IN | BODY MASS INDEX: 32.34 KG/M2 | SYSTOLIC BLOOD PRESSURE: 112 MMHG

## 2024-09-10 DIAGNOSIS — I25.5 ISCHEMIC CARDIOMYOPATHY: Chronic | ICD-10-CM

## 2024-09-10 DIAGNOSIS — I25.118 CORONARY ARTERY DISEASE OF NATIVE HEART WITH STABLE ANGINA PECTORIS, UNSPECIFIED VESSEL OR LESION TYPE (HCC): ICD-10-CM

## 2024-09-10 DIAGNOSIS — I50.22 CHRONIC HFREF (HEART FAILURE WITH REDUCED EJECTION FRACTION) (HCC): ICD-10-CM

## 2024-09-10 DIAGNOSIS — E11.9 NEW ONSET TYPE 2 DIABETES MELLITUS (HCC): ICD-10-CM

## 2024-09-10 DIAGNOSIS — I50.20 HFREF (HEART FAILURE WITH REDUCED EJECTION FRACTION) (HCC): Primary | ICD-10-CM

## 2024-09-10 LAB
ANION GAP SERPL CALCULATED.3IONS-SCNC: 8 MMOL/L (ref 4–13)
ATRIAL RATE: 61 BPM
BUN SERPL-MCNC: 18 MG/DL (ref 5–25)
CALCIUM SERPL-MCNC: 10.1 MG/DL (ref 8.4–10.2)
CHLORIDE SERPL-SCNC: 101 MMOL/L (ref 96–108)
CO2 SERPL-SCNC: 29 MMOL/L (ref 21–32)
CREAT SERPL-MCNC: 0.98 MG/DL (ref 0.6–1.3)
GFR SERPL CREATININE-BSD FRML MDRD: 60 ML/MIN/1.73SQ M
GLUCOSE P FAST SERPL-MCNC: 133 MG/DL (ref 65–99)
P AXIS: 113 DEGREES
POTASSIUM SERPL-SCNC: 4.8 MMOL/L (ref 3.5–5.3)
PR INTERVAL: 176 MS
QRS AXIS: 118 DEGREES
QRSD INTERVAL: 84 MS
QT INTERVAL: 458 MS
QTC INTERVAL: 461 MS
SODIUM SERPL-SCNC: 138 MMOL/L (ref 135–147)
T WAVE AXIS: 118 DEGREES
VENTRICULAR RATE: 61 BPM

## 2024-09-10 PROCEDURE — 80048 BASIC METABOLIC PNL TOTAL CA: CPT

## 2024-09-10 PROCEDURE — 99214 OFFICE O/P EST MOD 30 MIN: CPT | Performed by: STUDENT IN AN ORGANIZED HEALTH CARE EDUCATION/TRAINING PROGRAM

## 2024-09-10 PROCEDURE — 93010 ELECTROCARDIOGRAM REPORT: CPT | Performed by: INTERNAL MEDICINE

## 2024-09-10 PROCEDURE — 36415 COLL VENOUS BLD VENIPUNCTURE: CPT

## 2024-09-10 NOTE — PROGRESS NOTES
"Advanced Heart Failure/Pulmonary Hypertension Outpatient Note - Vesna Langston 64 y.o. female MRN: [id number]    @ Encounter: [id number]    Assessment:  64 y.o. female PMH and acute problems listed later in this note (a partial list may also be included within 'assessment' section) p/w HF fu.  I first met Vesna Langston on 5/6/24.    Primarily ICM, HFrEF, LVEF 30-35%, nondilated LV  LHC 03/22/2024: s/p PCI to 100% stenosis of ostial/proximal LAD. No other residual dz elsewhere.  cMRI 03/26/2024: LVEF 20%. LVIDd 4.5 cm. \"Transmural scarring of the mid anterior, anteroseptal and inferoseptal walls, the apical anterior, septal and inferior walls and the apex.\"   Coronary artery disease  S/p MI in 03/2024; received PCI to 100% stenosis of ostial/proximal LAD.  History of monomorphic ventricular tachycardia              Per EP notes, felt to be scar mediated.               S/p secondary prevention ICD.               Continues on amiodarone per EP.   Atrial flutter, paroxysmal               Anticoagulation on Eliquis.               Rhythm control: amiodarone per EP.  PE 5/2024. CTA: Single subsegmental right lower lobe pulmonary embolus as shown on abdominal CT.   Hyperlipidemia  Diabetes mellitus, type II  Chronic back pain  History of tobacco abuse  4/7/24 CT: IMPRESSION:  Suspected small hemorrhage from the anterior wall of the distal gastric body with focal wall thickening. Bleeding due to underlying lesion or PUD is suspected. Recommend endoscopic correlation.  Past heavy NSAID use, 2024 stopped  Lung and spleen granulomas.  Works in [employer], no heavy lifting she says      I have reviewed all pertinent patient data including but not limited to:        Lab Units 09/10/24  0706 09/09/24  1731 08/25/24  0442   CREATININE mg/dL 0.98 1.06 0.92     Results from last 7 days   Lab Units 09/10/24  0706 09/09/24  1731   CREATININE mg/dL 0.98 1.06     Lab Results   Component Value Date    K 4.8 09/10/2024     Lab Results   Component " "Value Date    HGBA1C 6.7 (H) 06/25/2024     Lab Results   Component Value Date    HMK4WFWENBWP 7.815 (H) 08/08/2024     Lab Results   Component Value Date    LDLCALC 70 04/22/2024     Lab Results   Component Value Date    BNP 82 09/09/2024      No results found for: \"NTBNP\"       TODAY'S PLAN:     09/10/24  Warm, euvolemic  She felt better breathing after imdur inception recently  Then continued feeling well after subsequent recent entresto increase, labwork acceptable.  Remains quite active, no persistent TAMAYO, doing generally well at cardiac rehab and [place]. No chest pain. No palpitations. No syncope.  She had an isolated episode of sudden urge to nap, no dizziness or syncope, had not slept well night before.  She had projectile vomiting after drinking water after her 2am walk last Sunday. No persistent nausea, malaise,. No abd pain. On diarrhea. Isolated episode.  9/9/24 ED eval unremarkable  No arrhythmia events detected by device or at rehab    She has been consuming only 800 calories a day for 1 month or longer in an extreme attempt to lose weight > advised against this. Nutrition referral given today.    Strict RTC precautions given    Gdmt below  Limited by hypotension though appears to have some room  Re-attempt lower sglt2i dose now, long discussion risks vs benefits and this is pt wish  Fu echo 10/2024 on max gdmt, ordered previously   Has ICD for VT    Doubt amio contribution to Sx now  On amio per EP; Has fu 12/2024  Defer to EP to consider potential Rx change in case amio contributing to GI complaints/alternate long term AAD strategy than amio    On AC+plavix  On high intens statin    Deranged Thyroids and DM per PCP    Follow up:  With me in 4 weeks after echo, or sooner if symptoms evolve  In addition to follow up with their other medical providers    Key info from my prior notes:    Trial imdur for any component of angina  Then 1 week later up entresto and fu BMP " afterwards; this will also help with volume management  Cw lasix 20 qd standing for now, which is recent increase from prior    Warm, euvolemic  Has increased lasix 20 prn to qdaily standing for past 1 week for increased weight, mild sob, mild edema of feet - no major improvement.  Today is last minute urgent visit for ED visit 8/25 for CP in setting of home  transiently, ACS ruled out. Then 8/26 yesterday had episode of vomiting, faintness, no LOC, no chest pain, has felt better since then. No ICD shocks, no palpitations. Did not improve with meclizine. Normal-higher blood sugars at home recently/during events.  Very rare opioid use she says for her back pain issues, none since 1-2 weeks ago    Follows GI    Discussed therapeutic lifestyle changes, low-moderate intensity exercise, maintain acceptable hydration 64 oz water daily, salt restrict, Mediterranean vs DASH diet, weight loss  Avoid nsaids    Further review of return to work next week  Safest plan would be no driving x 3 mo - resume 6/2024 if remains stable    Pharmacotherapies / Neurohormonal Blockade:  --Beta Blocker: metoprolol succinate 50 qd  --ARNi / ACEi / ARB: entresto 24/26 bid > 49/51 bid  --Aldosterone Antagonist: aldactone 25 qd  --SGLT2 Inhibitor: jardiance 25 qd for HF and DM > subsequently Sglt2i stopped in early 2024 2/2 yeast infx>9/10/24 Re-attempt lower sglt2i dose now, jardiance 10 qd, long discussion risks vs benefits and this is pt wish    --Diuretic: 20 qd standing  Long discussion about evidence of worsening HF, when to self uptitrate home diuretic and call cardiology office     Sudden Cardiac Death Risk Reduction:  --Medtronic dual chamber ICD in situ since 03/2024 (secondary prevention).   8/1/24  MDT DUAL ICD (MVP ON) / ACTIVE SYSTEM IS MRI CONDITIONAL   DEVICE INTERROGATED IN THE [facility] OFFICE. BATTERY VOLTAGE ADEQUATE(12.4 YRS). AP 21%  <0.1% ALL LEAD PARAMETERS WITHIN NORMAL LIMITS. NO NEW SIGNIFICANT HIGH RATE  EPISODES. OPTI-VOL WITHIN NORMAL LIMITS. NO PROGRAMMING CHANGES MADE TO DEVICE PARAMETERS. NORMAL DEVICE FUNCTION.   Electrical Resynchronization:  --Candidacy for BiV device: narrow QRS.      Advanced Therapies: Will continue to monitor.    Studies:  I have reviewed all pertinent patient data/labs/imaging where available, including but not limited to the below studies. Selected results may be displayed here but comprehensive listing is omitted for note clarity and can be found in the epic chart.    ECG.    Echo.    Stress.    Cath.    HPI:   64 y.o. female PMH and acute problems listed later in this note (a partial list may also be included within 'assessment' section) p/w HF fu.  I first met Vesnamarielena Langston on 5/6/24.  No new CP/SOB/dizziness/palpitations/syncope.  No new fatigue.  No new unintentional weight changes.  No new leg swelling, PND, pillow orthopnea.  No new fevers, chills, cough, nausea, vomiting, diarrhea, dysuria.      Interval History:  As noted in 'plan' section above and prior epic chart notes.    No new palpitations/syncope/sob/cp.  No new PND, pillow orthopnea.  No new fevers, chills, cough, diarrhea, dysuria.    Past Medical History:   Diagnosis Date    Acute respiratory insufficiency 03/22/2024    Allergic     Chronic HFrEF (heart failure with reduced ejection fraction) ([facility])     Coronary artery disease     COVID-19 virus infection 11/28/2022    Diabetes mellitus ([facility])     Disease of thyroid gland 4/09/2024    Hyperlipidemia     Hypoglycemia     ICD (implantable cardioverter-defibrillator) in place     Ischemic cardiomyopathy     Lactic acidosis 03/22/2024    Myocardial infarction ([facility]) 3/22/2024    Otitis media 1966    ST elevation myocardial infarction involving left anterior descending (LAD) coronary artery ([facility]) 03/22/2024    Tobacco abuse     Visual impairment 1967     Patient Active Problem List    Diagnosis Date Noted    Obesity, morbid ([facility]) 08/27/2024    Ganglion cyst of finger of left  hand 08/15/2024    Acute appendicitis with localized peritonitis and gangrene, without perforation or abscess 05/08/2024    Acute pulmonary embolism without acute cor pulmonale ([facility]) 05/07/2024    History of gastric ulcer 05/07/2024    Generalized anxiety disorder 05/02/2024    Anemia due to acute blood loss 04/19/2024    Acquired hypothyroidism 04/19/2024    Constipation 04/10/2024    Coffee ground emesis 04/07/2024    Type 2 diabetes mellitus, without long-term current use of insulin ([facility]) 04/07/2024    Syncope 04/01/2024    Guaiac positive stools 04/01/2024    S/P ICD (internal cardiac defibrillator) procedure 03/27/2024    Chronic low back pain 03/25/2024    HFrEF (heart failure with reduced ejection fraction) ([facility]) 03/25/2024    Ischemic cardiomyopathy 03/24/2024    Coronary artery disease involving native coronary artery of native heart 03/23/2024    S/P coronary artery stent placement 03/23/2024    Atrial flutter, paroxysmal ([facility]) 03/22/2024    Hyperlipemia 03/22/2024    New onset type 2 diabetes mellitus ([facility]) 03/22/2024    Tobacco abuse 03/22/2024    History of sustained ventricular tachycardia 03/22/2024    Back pain 07/31/2022    Sciatica of left side 07/31/2022       ROS:  10 point ROS negative except as specified in HPI/interval history    Allergies   Allergen Reactions    Shellfish-Derived Products - Food Allergy Anaphylaxis    Azithromycin Rash     Current Outpatient Medications   Medication Instructions    albuterol (ProAir HFA) 90 mcg/act inhaler 2 puffs, Inhalation, Every 6 hours PRN    amiodarone 200 mg, Oral, Daily    apixaban (ELIQUIS) 5 mg, Oral, 2 times daily    atorvastatin (LIPITOR) 80 mg, Oral, Every evening    clopidogrel (PLAVIX) 75 mg, Oral, Daily    Empagliflozin (JARDIANCE) 10 mg, Oral, Every morning    furosemide (LASIX) 20 mg, Oral, Daily    gabapentin (NEURONTIN) 100 mg, Oral, 2 times daily    HYDROcodone-acetaminophen (NORCO) 5-325 mg per tablet 1 tablet, Oral, Every 6 hours PRN     isosorbide mononitrate (IMDUR) 30 mg, Oral, Daily    levothyroxine (SYNTHROID) 75 mcg, Oral, Daily    meclizine (ANTIVERT) 25 mg, Oral, Every 8 hours PRN    metoprolol succinate (TOPROL-XL) 50 mg, Oral, Daily at bedtime    Multiple Vitamin (MULTIVITAMIN) capsule 1 capsule, Daily    pantoprazole (PROTONIX) 40 mg, Oral, 2 times daily before meals    sacubitril-valsartan (Entresto) 49-51 MG TABS 1 tablet, Oral, 2 times daily    spironolactone (ALDACTONE) 25 mg, Oral, Daily      Social History     Socioeconomic History    Marital status:      Spouse name: Not on file    Number of children: 3    Years of education: Not on file    Highest education level: Not on file   Occupational History    Not on file   Tobacco Use    Smoking status: Former     Current packs/day: 0.00     Average packs/day: 1 pack/day for 24.2 years (24.2 ttl pk-yrs)     Types: Cigarettes     Start date: 2000     Quit date: 3/22/2024     Years since quittin.4    Smokeless tobacco: Never    Tobacco comments:     Smoked 0.5-1 ppd; quit in 2024.    Vaping Use    Vaping status: Never Used   Substance and Sexual Activity    Alcohol use: Not Currently     Alcohol/week: 1.0 standard drink of alcohol     Types: 1 Shots of liquor per week     Comment: rarely    Drug use: Never    Sexual activity: Not Currently     Partners: Female     Birth control/protection: Post-menopausal   Other Topics Concern    Not on file   Social History Narrative    Not on file     Social Determinants of Health     Financial Resource Strain: Low Risk  (2023)    [facility]    Overall Financial Resource Strain (CARDIA)     Difficulty of Paying Living Expenses: Not hard at all   Food Insecurity: Patient Declined (2024)    Hunger Vital Sign     Worried About Running Out of Food in the Last Year: Patient declined     Ran Out of Food in the Last Year: Patient declined   Transportation Needs: No  "Transportation Needs (9/9/2024)    PRAPARE - Transportation     Lack of Transportation (Medical): No     Lack of Transportation (Non-Medical): No   Physical Activity: Not on file   Stress: No Stress Concern Present (2/20/2023)    [facility]    [employer] - Occupational Stress Questionnaire     Feeling of Stress : Not at all   Social Connections: Unknown (6/18/2024)    Received from [facility]    Social Connections     How often do you feel lonely or isolated from those around you? (Adult - for ages 18 years and over): Not on file   Intimate Partner Violence: Not At Risk (2/20/2023)    [facility]    Humiliation, Afraid, Rape, and Kick questionnaire     Fear of Current or Ex-Partner: No     Emotionally Abused: No     Physically Abused: No     Sexually Abused: No   Housing Stability: High Risk (9/9/2024)    Housing Stability Vital Sign     Unable to Pay for Housing in the Last Year: Patient declined     Number of Times Moved in the Last Year: 2     Homeless in the Last Year: No     Family History   Adopted: Yes   Problem Relation Age of Onset    Depression Mother     Heart disease Father     OCD Daughter        Physical Exam:  Vitals:    09/10/24 1255   BP: 112/74   BP Location: Left arm   Patient Position: Sitting   Cuff Size: Standard   Pulse: 82   SpO2: 97%   Weight: 96.8 kg (213 lb 6.4 oz)   Height: 5' 8\" (1.727 m)       Constitutional: NAD, non toxic  Ears/nose/mouth/throat: atraumatic  CV: RRR, nl S1S2, no murmurs/rubs/gallups, no JVD, no HJR  Resp: CTABL  GI: Soft, NTND  MSK: no swollen joints in exposed areas  Extr: No edema, warm LE  Pysche: Normal affect  Neuro: appropriate in conversation  Skin: dry and intact in exposed areas    Labs & Results:  Lab Results   Component Value Date    SODIUM 138 09/10/2024    K 4.8 09/10/2024     09/10/2024    CO2 29 " 09/10/2024    BUN 18 09/10/2024    CREATININE 0.98 09/10/2024    GLUC 148 (H) 09/09/2024    CALCIUM 10.1 09/10/2024     Lab Results   Component Value Date    WBC 8.55 09/09/2024    HGB 12.0 09/09/2024    HCT 38.2 09/09/2024    MCV 82 09/09/2024     09/09/2024       Counseling / Coordination of Care  Greater than 50% of total time was spent with the patient and / or family counseling and / or coordination of care.  We discussed diagnoses, most recent studies, tests and any changes in treatment plan.    Thank you for the opportunity to participate in the care of this patient.    Vitor Bell MD  Attending Physician  Advanced Heart Failure and Transplant Cardiology  [facility]

## 2024-09-11 ENCOUNTER — APPOINTMENT (OUTPATIENT)
Dept: CARDIAC REHAB | Facility: CLINIC | Age: 64
End: 2024-09-11
Payer: COMMERCIAL

## 2024-09-12 ENCOUNTER — CLINICAL SUPPORT (OUTPATIENT)
Dept: CARDIAC REHAB | Facility: CLINIC | Age: 64
End: 2024-09-12
Payer: COMMERCIAL

## 2024-09-12 DIAGNOSIS — I50.22 CHRONIC SYSTOLIC HEART FAILURE (HCC): Primary | ICD-10-CM

## 2024-09-12 PROCEDURE — 93798 PHYS/QHP OP CAR RHAB W/ECG: CPT

## 2024-09-13 ENCOUNTER — OFFICE VISIT (OUTPATIENT)
Dept: FAMILY MEDICINE CLINIC | Facility: CLINIC | Age: 64
End: 2024-09-13
Payer: COMMERCIAL

## 2024-09-13 VITALS
WEIGHT: 215 LBS | DIASTOLIC BLOOD PRESSURE: 60 MMHG | HEIGHT: 68 IN | BODY MASS INDEX: 32.58 KG/M2 | SYSTOLIC BLOOD PRESSURE: 110 MMHG

## 2024-09-13 DIAGNOSIS — E03.9 ACQUIRED HYPOTHYROIDISM: ICD-10-CM

## 2024-09-13 DIAGNOSIS — E11.9 TYPE 2 DIABETES MELLITUS WITHOUT COMPLICATION, WITHOUT LONG-TERM CURRENT USE OF INSULIN (HCC): ICD-10-CM

## 2024-09-13 DIAGNOSIS — Z00.00 MEDICARE ANNUAL WELLNESS VISIT, SUBSEQUENT: ICD-10-CM

## 2024-09-13 DIAGNOSIS — E11.9 NEW ONSET TYPE 2 DIABETES MELLITUS (HCC): Primary | ICD-10-CM

## 2024-09-13 DIAGNOSIS — E78.5 HYPERLIPIDEMIA, UNSPECIFIED HYPERLIPIDEMIA TYPE: ICD-10-CM

## 2024-09-13 DIAGNOSIS — M67.442 GANGLION CYST OF FINGER OF LEFT HAND: ICD-10-CM

## 2024-09-13 DIAGNOSIS — I48.92 ATRIAL FLUTTER, PAROXYSMAL (HCC): Chronic | ICD-10-CM

## 2024-09-13 PROCEDURE — 99214 OFFICE O/P EST MOD 30 MIN: CPT | Performed by: FAMILY MEDICINE

## 2024-09-13 PROCEDURE — G0439 PPPS, SUBSEQ VISIT: HCPCS | Performed by: FAMILY MEDICINE

## 2024-09-13 NOTE — PROGRESS NOTES
"Answers submitted by the patient for this visit:  Medicare Annual Wellness Visit (Submitted on 9/9/2024)  How would you rate your overall health?: fair  Compared to last year, how is your physical health?: slightly worse  In general, how satisfied are you with your life?: satisfied  Compared to last year, how is your eyesight?: same  Compared to last year, how is your hearing?: same  Compared to last year, how is your emotional/mental health?: same  How often is anger a problem for you?: never, rarely  How often do you feel unusually tired/fatigued?: sometimes  In the past 7 days, how much pain have you experienced?: a lot  If you answered \"some\" or \"a lot\", please rate the severity of your pain on a scale of 1 to 10 (1 being the least severe pain and 10 being the most intense pain).: 4/10  In the past 6 months, have you lost or gained 10 pounds without trying?: Yes  One or more falls in the last year: Yes  In the past 6 months, have you accidentally leaked urine?: No  Do you have trouble with the stairs inside or outside your home?: Yes  Does your home have working smoke alarms?: Yes  Does your home have a carbon monoxide monitor?: Yes  Which safety hazards (if any) have you experienced in your home? Please select all that apply.: none  How would you describe your current diet? Please select all that apply.: Diabetic, Low Cholesterol, Low SatAnswers submitted by the patient for this visit:  Medicare Annual Wellness Visit (Submitted on 9/9/2024)  How would you rate your overall health?: fair  Compared to last year, how is your physical health?: slightly worse  In general, how satisfied are you with your life?: satisfied  Compared to last year, how is your eyesight?: same  Compared to last year, how is your hearing?: same  Compared to last year, how is your emotional/mental health?: same  How often is anger a problem for you?: never, rarely  How often do you feel unusually tired/fatigued?: sometimes  In the past 7 " "days, how much pain have you experienced?: a lot  If you answered \"some\" or \"a lot\", please rate the severity of your pain on a scale of 1 to 10 (1 being the least severe pain and 10 being the most intense pain).: 4/10  In the past 6 months, have you lost or gained 10 pounds without trying?: Yes  One or more falls in the last year: Yes  In the past 6 months, have you accidentally leaked urine?: No  Do you have trouble with the stairs inside or outside your home?: Yes  Does your home have working smoke alarms?: Yes  Does your home have a carbon monoxide monitor?: Yes  Which safety hazards (if any) have you experienced in your home? Please select all that apply.: none  How would you describe your current diet? Please select all that apply.: Diabetic, Low Cholesterol, Low Saturated Fat, Low Carb, No Added Salt  In addition to prescription medications, are you taking any over-the-counter supplements?: No  Can you manage your medications?: Yes  Are you currently taking any opioid medications?: Yes  Can you walk and transfer into and out of your bed and chair?: Yes  Can you dress and groom yourself?: Yes  Can you bathe or shower yourself?: Yes  Can you feed yourself?: Yes  Can you do your laundry/ housekeeping?: Yes  Can you manage your money, pay your bills, and track your expenses?: Yes  Can you make your own meals?: Yes  Can you do your own shopping?: Yes  Within the last 12 months, have you had any hospitalizations or Emergency Department visits?: Yes  If yes, how many times have you been hospitalized within the past year?: more than 4  Do you have a living will?: No  Do you have a Durable POA (Power of ) for healthcare decisions?: Yes  Do you have an Advanced Directive for end of life decisions?: No  How often have you used an illegal drug (including marijuana) or a prescription medication for non-medical reasons in the past year?: never  What is the typical number of drinks you consume in a day?: 0  What is " the typical number of drinks you consume in a week?: 0  How often did you have a drink containing alcohol in the past year?: monthly or less  How many drinks did you have on a typical day  when you were drinking in the past year?: 1 to 2  How often did you have 6 or more drinks on one occasion in the past year?: never  urated Fat, Low Carb, No Added Salt  In addition to prescription medications, are you taking any over-the-counter supplements?: No  Can you manage your medications?: Yes  Are you currently taking any opioid medications?: Yes  Can you walk and transfer into and out of your bed and chair?: Yes  Can you dress and groom yourself?: Yes  Can you bathe or shower yourself?: Yes  Can you feed yourself?: Yes  Can you do your laundry/ housekeeping?: Yes  Can you manage your money, pay your bills, and track your expenses?: Yes  Can you make your own meals?: Yes  Can you do your own shopping?: Yes  Within the last 12 months, have you had any hospitalizations or Emergency Department visits?: Yes  If yes, how many times have you been hospitalized within the past year?: more than 4  Do you have a living will?: No  Do you have a Durable POA (Power of ) for healthcare decisions?: Yes  Do you have an Advanced Directive for end of life decisions?: No  How often have you used an illegal drug (including marijuana) or a prescription medication for non-medical reasons in the past year?: never  What is the typical number of drinks you consume in a day?: 0  What is the typical number of drinks you consume in a week?: 0  How often did you have a drink containing alcohol in the past year?: monthly or less  How many drinks did you have on a typical day  when you were drinking in the past year?: 1 to 2  How often did you have 6 or more drinks on one occasion in the past year?: never  Ambulatory Visit  Name: Vesna Langston      : 1960      MRN: [id number]  Encounter Provider: Brandon Pete MD  Encounter Date:  9/13/2024   Encounter department: [facility]    Assessment & Plan  New onset type 2 diabetes mellitus (HCC)    Lab Results   Component Value Date    HGBA1C 6.7 (H) 06/25/2024       Orders:    Hemoglobin A1C; Future    Hyperlipidemia, unspecified hyperlipidemia type    Orders:    Lipid panel; Future    Acquired hypothyroidism    Orders:    TSH, 3rd generation with Free T4 reflex; Future         History of Present Illness   {Disappearing Hyperlinks I Encounters * My Last Note * Since Last Visit * History :49258}  HPI  Review of Systems   HENT:  Negative for congestion and trouble swallowing.    Eyes:  Positive for visual disturbance.   Respiratory:  Negative for cough, chest tightness and shortness of breath.    Cardiovascular:  Negative for chest pain, palpitations and leg swelling.   Gastrointestinal:  Negative for constipation and diarrhea.   Endocrine: Negative for polyuria.   Musculoskeletal:  Positive for arthralgias and back pain.   Allergic/Immunologic: Positive for environmental allergies.   Neurological:  Positive for light-headedness. Negative for dizziness.   Psychiatric/Behavioral:  Positive for sleep disturbance.      Past Medical History:   Diagnosis Date    Acute respiratory insufficiency 03/22/2024    Allergic     Chronic HFrEF (heart failure with reduced ejection fraction) ([facility])     Coronary artery disease     COVID-19 virus infection 11/28/2022    Diabetes mellitus (HCC)     Disease of thyroid gland 4/09/2024    Hyperlipidemia     Hypoglycemia     ICD (implantable cardioverter-defibrillator) in place     Ischemic cardiomyopathy     Lactic acidosis 03/22/2024    Myocardial infarction (HCC) 3/22/2024    Otitis media 1966    ST elevation myocardial infarction involving left anterior descending (LAD) coronary artery ([facility]) 03/22/2024    Tobacco abuse     Visual impairment 1967     Past Surgical History:   Procedure Laterality Date    ADENOIDECTOMY      APPENDECTOMY       APPENDECTOMY LAPAROSCOPIC N/A 2024    Procedure: APPENDECTOMY LAPAROSCOPIC;  Surgeon: Lauryn Ullrich, DO;  Location: [facility];  Service: General    BREAST EXCISIONAL BIOPSY Right 2000    cyst removed- benign    CARDIAC CATHETERIZATION N/A 2024    Procedure: Cardiac pci;  Surgeon: Gabriel Myers MD;  [facility];  Service: Cardiology    CARDIAC CATHETERIZATION N/A 2024    Procedure: Cardiac Coronary Angiogram;  Surgeon: Gabriel Myers MD;  [facility];  Service: Cardiology    CARDIAC CATHETERIZATION N/A 2024    Procedure: Cardiac PCI AMI;  Surgeon: Gabriel Myers MD;  [facility];  Service: Cardiology    CARDIAC CATHETERIZATION N/A 2024    Procedure: Cardiac IVUS;  Surgeon: Gabriel Myers MD;  [facility];  Service: Cardiology    CARDIAC ELECTROPHYSIOLOGY PROCEDURE N/A 2024    Procedure: Cardiac icd implant;  Surgeon: Jeffy Lama MD;  [facility];  Service: Cardiology     SECTION      ECTOPIC PREGNANCY SURGERY      SEPTOPLASTY      SPINE SURGERY  23    TONSILLECTOMY       Family History   Adopted: Yes   Problem Relation Age of Onset    Depression Mother     Heart disease Father     OCD Daughter      Social History     Tobacco Use    Smoking status: Former     Current packs/day: 0.00     Average packs/day: 1 pack/day for 24.2 years (24.2 total pack years)     Types: Cigarettes     Start date: 2000     Quit date: 3/22/2024     Years since quittin.4    Smokeless tobacco: Never    Tobacco comments:     Smoked 0.5-1 ppd; quit in 2024.    Vaping Use    Vaping status: Never Used   Substance and Sexual Activity    Alcohol use: Not Currently     Alcohol/week: 1.0 standard drink of alcohol     Types: 1 Shots of liquor per week     Comment: rarely    Drug use: Never    Sexual activity: Not Currently     Partners: Female     Birth control/protection: Post-menopausal     Current  Outpatient Medications on File Prior to Visit   Medication Sig    albuterol (ProAir HFA) 90 mcg/act inhaler Inhale 2 puffs every 6 (six) hours as needed for wheezing    amiodarone 200 mg tablet Take 1 tablet (200 mg total) by mouth daily    apixaban (ELIQUIS) 5 mg Take 1 tablet (5 mg total) by mouth 2 (two) times a day    atorvastatin (LIPITOR) 80 mg tablet TAKE 1 TABLET BY MOUTH EVERY EVENING    clopidogrel (PLAVIX) 75 mg tablet Take 1 tablet (75 mg total) by mouth daily    Empagliflozin (Jardiance) 10 MG TABS tablet Take 1 tablet (10 mg total) by mouth every morning    furosemide (LASIX) 20 mg tablet Take 1 tablet (20 mg total) by mouth daily    gabapentin (Neurontin) 100 mg capsule Take 1 capsule (100 mg total) by mouth 2 (two) times a day (Patient taking differently: Take 100 mg by mouth 3 (three) times a day)    HYDROcodone-acetaminophen (NORCO) 5-325 mg per tablet Take 1 tablet by mouth every 6 (six) hours as needed for pain for up to 10 doses Max Daily Amount: 4 tablets    isosorbide mononitrate (IMDUR) 30 mg 24 hr tablet Take 1 tablet (30 mg total) by mouth daily    levothyroxine (Synthroid) 75 mcg tablet Take 1 tablet (75 mcg total) by mouth daily    meclizine (ANTIVERT) 25 mg tablet Take 1 tablet (25 mg total) by mouth every 8 (eight) hours as needed for dizziness    metoprolol succinate (TOPROL-XL) 25 mg 24 hr tablet Take 2 tablets (50 mg total) by mouth daily at bedtime    pantoprazole (PROTONIX) 40 mg tablet TAKE 1 TABLET BY MOUTH 2 TIMES A DAY BEFORE MEALS.    sacubitril-valsartan (Entresto) 49-51 MG TABS Take 1 tablet by mouth 2 (two) times a day    spironolactone (ALDACTONE) 25 mg tablet Take 1 tablet (25 mg total) by mouth daily    Multiple Vitamin (MULTIVITAMIN) capsule Take 1 capsule by mouth daily (Patient not taking: Reported on 8/27/2024)     Allergies   Allergen Reactions    Shellfish-Derived Products - Food Allergy Anaphylaxis    Azithromycin Rash     Immunization History   Administered  "Date(s) Administered    COVID-19 MODERNA VACC 0.5 ML IM 04/19/2021, 05/17/2021    COVID-19 Moderna mRNA Vaccine 12 Yr+ 50 mcg/0.5 mL (Spikevax) 09/04/2024    Hep A / Hep B 09/05/2024    INFLUENZA 10/10/2019, 09/23/2021, 09/22/2022, 12/13/2023    Influenza Split High Dose Preservative Free IM 09/04/2024    Pneumococcal Conjugate Vaccine 20-valent (Pcv20), Polysace 04/04/2023    Respiratory Syncytial Virus Vaccine (Recombinant, Adjuvanted) 09/05/2024    Tdap 09/18/2020    Typhoid, Unspecified 10/10/2019    Zoster Vaccine Recombinant 09/18/2020, 01/04/2021     Objective   {Disappearing Hyperlinks   Review Vitals * Enter New Vitals * Results Review * Labs * Imaging * Cardiology * Procedures * Lung Cancer Screening * Surgical eConsent :76322}  Blood Pressure 110/60 (BP Location: Left arm, Patient Position: Sitting, Cuff Size: Large)   Height 5' 8\" (1.727 m)   Weight 97.5 kg (215 lb)   Body Mass Index 32.69 kg/m²     Physical Exam  Cardiovascular:      Pulses:           Dorsalis pedis pulses are 1+ on the right side and 1+ on the left side.        Posterior tibial pulses are 1+ on the right side and 1+ on the left side.      Heart sounds: No murmur heard.  Musculoskeletal:      Right foot: No deformity.      Left foot: No deformity.   Feet:      Right foot:      Protective Sensation: 8 sites tested.  8 sites sensed.      Skin integrity: Skin integrity normal.      Left foot:      Protective Sensation: 8 sites tested.  8 sites sensed.      Skin integrity: Skin integrity normal.   Neurological:      Deep Tendon Reflexes: Reflexes abnormal.       {Administrative / Billing Section (Optional):50506}  "

## 2024-09-13 NOTE — PROGRESS NOTES
Diabetic Foot Exam    Patient's shoes and socks removed.    Right Foot/Ankle   Right Foot Inspection  Skin Exam: skin normal and skin intact. No dry skin, no warmth, no callus, no erythema, no maceration, no abnormal color, no pre-ulcer, no ulcer and no callus.     Toe Exam:  no right toe deformity    Sensory   Monofilament testing: intact    Vascular  Capillary refills: < 3 seconds  The right DP pulse is 1+. The right PT pulse is 1+.     Left Foot/Ankle  Left Foot Inspection  Skin Exam: skin normal and skin intact. No dry skin, no warmth, no erythema, no maceration, normal color, no pre-ulcer, no ulcer and no callus.     Toe Exam: No left toe deformity.     Sensory   Monofilament testing: intact    Vascular  Capillary refills: < 3 seconds  The left DP pulse is 1+. The left PT pulse is 1+.     Assign Risk Category  No deformity present  No loss of protective sensation  No weak pulses  Risk: 0      Ambulatory Visit  Name: Vesna Langston      : 1960      MRN: [id number]  Encounter Provider: Brandon Pete MD  Encounter Date: 2024   Encounter department: [facility]    Assessment & Plan  New onset type 2 diabetes mellitus (HCC)    Lab Results   Component Value Date    HGBA1C 6.7 (H) 2024       Orders:    Hemoglobin A1C; Future  Recheck A1c on 925.  Continue to watch starch in diet and push daily activity.  Continue current meds.  Discussed the lightheaded episodes during and right after cardiac rehab.  I feel this probably relates to low blood sugar and should make sure has snack before doing the cardiac rehab as she does this after she comes home from work  Hyperlipidemia, unspecified hyperlipidemia type  Recheck lipid panel.  Continue current medication  Orders:    Lipid panel; Future    Acquired hypothyroidism  Recheck thyroid levels and adjust meds accordingly  Orders:    TSH, 3rd generation with Free T4 reflex; Future    Medicare annual wellness visit,  subsequent         Ganglion cyst of finger of left hand  Going to follow-up with orthopedics for this         Type 2 diabetes mellitus without complication, without long-term current use of insulin (HCC)    Lab Results   Component Value Date    HGBA1C 6.7 (H) 06/25/2024            Atrial flutter, paroxysmal (HCC)            Preventive health issues were discussed with patient, and age appropriate screening tests were ordered as noted in patient's After Visit Summary. Personalized health advice and appropriate referrals for health education or preventive services given if needed, as noted in patient's After Visit Summary.    History of Present Illness           Patient had 3 recent hospitalizations the first for MI then for problems with syncope and was found to be in atrial flutter.  Did have pacemaker placed with defibrillator.  Last hospitalization was for GI blood loss and was found to have severe ulcerations in the stomach.  Is on protonic at this time as well as Carafate and no longer having pain.  The Carafate has now been stopped.  Was started on levothyroxine for problems with hypothyroidism while in the hospital.  Is following up with cardiology and interventional cardiology in [place].  Is now doing cardiac rehab.  Does have some bumps on her left palm that are intermittently tender.  Has been watching her diet much more closely and last A1c was down to 6.7.  Still is concerned about her blood sugar.  Seen today also for Medicare wellness check       Patient Care Team:  Brandon Pete MD as PCP - General (Family Medicine)  Jeffy Lama MD (Cardiology)    Review of Systems   Constitutional:  Negative for chills and fever.   HENT:  Negative for congestion and trouble swallowing.    Eyes:  Negative for pain and visual disturbance.   Respiratory:  Negative for cough, chest tightness and shortness of breath.    Cardiovascular:  Negative for chest pain and palpitations.   Gastrointestinal:  Negative for  abdominal pain, constipation, diarrhea and vomiting.   Endocrine: Negative for polyuria (Nocturia x 1).   Genitourinary:  Negative for dysuria.   Musculoskeletal:  Positive for arthralgias and back pain.   Skin:  Negative for color change and rash.   Allergic/Immunologic: Positive for environmental allergies.   Neurological:  Positive for light-headedness. Negative for seizures and syncope.   Psychiatric/Behavioral:  Positive for sleep disturbance.    All other systems reviewed and are negative.    Medical History Reviewed by provider this encounter:       Annual Wellness Visit Questionnaire   Vesna is here for her Subsequent Wellness visit. Last Medicare Wellness visit information reviewed, patient interviewed and updates made to the record today.      Health Risk Assessment:   Patient rates overall health as fair. Patient feels that their physical health rating is slightly worse. Patient is satisfied with their life. Eyesight was rated as same. Hearing was rated as same. Patient feels that their emotional and mental health rating is same. Patients states they are never, rarely angry. Patient states they are sometimes unusually tired/fatigued. Pain experienced in the last 7 days has been a lot. Patient's pain rating has been 4/10. Patient states that she has experienced weight loss or gain in last 6 months. Does continue to have her intermittent back pain    Fall Risk Screening:   In the past year, patient has experienced: history of falling in past year    Number of falls: 1  Injured during fall?: No    Feels unsteady when standing or walking?: No    Worried about falling?: No      Urinary Incontinence Screening:   Patient has not leaked urine accidently in the last six months. Sinew to push walking activity    Home Safety:  Patient has trouble with stairs inside or outside of their home. Patient has working smoke alarms and has working carbon monoxide detector. Home safety hazards include: none. No  issues    Nutrition:   Current diet is Diabetic, Low Cholesterol, Low Saturated Fat, Low Carb and No Added Salt. No issues    Medications:   Patient is not currently taking any over-the-counter supplements. Patient is able to manage medications. No issues does follow-up for her chronic back pain    Activities of Daily Living (ADLs)/Instrumental Activities of Daily Living (IADLs):   Walk and transfer into and out of bed and chair?: Yes  Dress and groom yourself?: Yes    Bathe or shower yourself?: Yes    Feed yourself? Yes  Do your laundry/housekeeping?: Yes  Manage your money, pay your bills and track your expenses?: Yes  Make your own meals?: Yes    Do your own shopping?: Yes    ADL comments: Overall functions well around the house.  No issues    Previous Hospitalizations:   Any hospitalizations or ED visits within the last 12 months?: Yes    How many hospitalizations have you had in the last year?: more than 4    Hospitalization Comments: Patient follows up with [facility] for cardiology, vascular surgery, orthopedics and gastroenterology    Advance Care Planning:   Living will: No    Durable POA for healthcare: Yes    Advanced directive: No    Advanced directive counseling given: Yes    ACP document given: Yes    End of Life Decisions reviewed with patient: No      Comments: Claims actually does have form but gave her another form and told her to bring the filled out form into the office to have scanned into the chart    Cognitive Screening:   Provider or family/friend/caregiver concerned regarding cognition?: No    PREVENTIVE SCREENINGS      Cardiovascular Screening:    General: Screening Not Indicated and History Lipid Disorder      Diabetes Screening:     General: Screening Not Indicated and History Diabetes      Colorectal Cancer Screening:     General: Screening Current      Breast Cancer Screening:     General: Screening Current      Cervical Cancer Screening:    General: Screening Not Indicated       Osteoporosis Screening:    General: Screening Current      Abdominal Aortic Aneurysm (AAA) Screening:        General: Screening Not Indicated      Lung Cancer Screening:     General: Screening Current      Hepatitis C Screening:    General: Risks and Benefits Discussed      Preventive Screening Comments: Continue follow-up with routine eye care and have reports forwarded to office    Screening, Brief Intervention, and Referral to Treatment (SBIRT)    Screening  Typical number of drinks in a day: 0  Typical number of drinks in a week: 0  Interpretation: Low risk drinking behavior.    AUDIT-C Screenin) How often did you have a drink containing alcohol in the past year? monthly or less  2) How many drinks did you have on a typical day when you were drinking in the past year? 1 to 2  3) How often did you have 6 or more drinks on one occasion in the past year? never    AUDIT-C Score: 1  Interpretation: Score 0-2 (female): Negative screen for alcohol misuse    Single Item Drug Screening:  How often have you used an illegal drug (including marijuana) or a prescription medication for non-medical reasons in the past year? never    Single Item Drug Screen Score: 0  Interpretation: Negative screen for possible drug use disorder    Brief Intervention  Alcohol & drug use screenings were reviewed. No concerns regarding substance use disorder identified.     Review of Current Opioid Use    Opioid Risk Tool (ORT) Interpretation: Complete Opioid Risk Tool (ORT)    Social Determinants of Health     Financial Resource Strain: Low Risk  (2023)    [facility]    Overall Financial Resource Strain (CARDIA)     Difficulty of Paying Living Expenses: Not hard at all   Food Insecurity: No Food Insecurity (2024)    Hunger Vital Sign     Worried About Running Out of Food in the Last Year: Never true     Ran Out of Food in the Last Year: Never true   Transportation  "Needs: No Transportation Needs (9/13/2024)    PRAPARE - Transportation     Lack of Transportation (Medical): No     Lack of Transportation (Non-Medical): No   Housing Stability: High Risk (9/13/2024)    Housing Stability Vital Sign     Unable to Pay for Housing in the Last Year: No     Number of Times Moved in the Last Year: 2     Homeless in the Last Year: No   Utilities: Not At Risk (9/13/2024)    [facility] Utilities     Threatened with loss of utilities: No   Recent Concern: Utilities - At Risk (9/9/2024)    [facility] Utilities     Threatened with loss of utilities: Yes     No results found.    Objective     Blood Pressure 110/60 (BP Location: Left arm, Patient Position: Sitting, Cuff Size: Large)   Height 5' 8\" (1.727 m)   Weight 97.5 kg (215 lb)   Body Mass Index 32.69 kg/m²     Physical Exam  Vitals and nursing note reviewed.   Constitutional:       Appearance: Normal appearance. She is not ill-appearing.   HENT:      Head: Normocephalic.      Right Ear: Tympanic membrane, ear canal and external ear normal. Decreased hearing (25 dB hearing screen off at 4000 Hz.  No difficulty with conversation) noted.      Left Ear: Tympanic membrane, ear canal and external ear normal. Decreased hearing (25 dB hearing screen off at 4000 Hz) noted.      Nose: Nose normal.      Mouth/Throat:      Mouth: Mucous membranes are moist.   Eyes:      Extraocular Movements: Extraocular movements intact.      Conjunctiva/sclera: Conjunctivae normal.      Pupils: Pupils are equal, round, and reactive to light.   Neck:      Vascular: No carotid bruit.   Cardiovascular:      Rate and Rhythm: Normal rate and regular rhythm.      Pulses: no weak pulses.           Dorsalis pedis pulses are 1+ on the right side and 1+ on the left side.        Posterior tibial pulses are 1+ on the right side and 1+ on the left side.      Heart sounds: Murmur (Rate is 72) heard.   Pulmonary:      Effort: Pulmonary effort is normal.      Breath sounds: Normal breath " sounds.   Abdominal:      General: Abdomen is flat. There is no distension.      Palpations: Abdomen is soft. There is no mass.      Tenderness: There is no abdominal tenderness.   Musculoskeletal:         General: No swelling.      Cervical back: Normal range of motion. No tenderness.      Right lower leg: No edema.      Left lower leg: No edema.      Right foot: No deformity.      Left foot: No deformity.        Feet:    Feet:      Right foot:      Protective Sensation: 8 sites tested.  8 sites sensed.      Skin integrity: Skin integrity normal. No ulcer, skin breakdown, erythema, warmth, callus or dry skin.      Left foot:      Skin integrity: Skin integrity normal. No ulcer, skin breakdown, erythema, warmth, callus or dry skin.   Lymphadenopathy:      Cervical: No cervical adenopathy.   Skin:     General: Skin is warm and dry.      Capillary Refill: Capillary refill takes 2 to 3 seconds.      Findings: No rash.   Neurological:      General: No focal deficit present.      Mental Status: She is alert and oriented to person, place, and time.      Cranial Nerves: No cranial nerve deficit.      Sensory: No sensory deficit.      Motor: No weakness.      Coordination: Coordination normal.      Gait: Gait normal.      Deep Tendon Reflexes: Reflexes abnormal (Reflexes 1+).   Psychiatric:         Mood and Affect: Mood normal.         Behavior: Behavior normal.         Thought Content: Thought content normal.         Judgment: Judgment normal.

## 2024-09-13 NOTE — PATIENT INSTRUCTIONS
Overall seems to be very functional.  Discussed the problem with the lightheaded episodes while doing the cardiac rehab which I feel probably represented low blood sugars and should be sure to have a snack before doing the cardiac rehab in the future.  Will recheck the A1c on 9/25 or after as well as the lipid panel and thyroid levels.  Try to push daily walking activity.  Discussed dietary changes.  Has had the flu shot  Medicare Preventive Visit Patient Instructions  Thank you for completing your Welcome to Medicare Visit or Medicare Annual Wellness Visit today. Your next wellness visit will be due in one year (9/14/2025).  The screening/preventive services that you may require over the next 5-10 years are detailed below. Some tests may not apply to you based off risk factors and/or age. Screening tests ordered at today's visit but not completed yet may show as past due. Also, please note that scanned in results may not display below.  Preventive Screenings:  Service Recommendations Previous Testing/Comments   Colorectal Cancer Screening  * Colonoscopy    * Fecal Occult Blood Test (FOBT)/Fecal Immunochemical Test (FIT)  * Fecal DNA/Cologuard Test  * Flexible Sigmoidoscopy Age: 45-75 years old   Colonoscopy: every 10 years (may be performed more frequently if at higher risk)  OR  FOBT/FIT: every 1 year  OR  Cologuard: every 3 years  OR  Sigmoidoscopy: every 5 years  Screening may be recommended earlier than age 45 if at higher risk for colorectal cancer. Also, an individualized decision between you and your healthcare provider will decide whether screening between the ages of 76-85 would be appropriate. Colonoscopy: Not on file  FOBT/FIT: Not on file  Cologuard: 06/20/2023  Sigmoidoscopy: Not on file          Breast Cancer Screening Age: 40+ years old  Frequency: every 1-2 years  Not required if history of left and right mastectomy Mammogram: 05/28/2024    Screening Current   Cervical Cancer Screening Between the  ages of 21-29, pap smear recommended once every 3 years.   Between the ages of 30-65, can perform pap smear with HPV co-testing every 5 years.   Recommendations may differ for women with a history of total hysterectomy, cervical cancer, or abnormal pap smears in past. Pap Smear: Not on file    Screening Not Indicated   Hepatitis C Screening Once for adults born between 1945 and 1965  More frequently in patients at high risk for Hepatitis C Hep C Antibody: Not on file        Diabetes Screening 1-2 times per year if you're at risk for diabetes or have pre-diabetes Fasting glucose: 133 mg/dL (9/10/2024)  A1C: 6.7 % (6/25/2024)  Screening Not Indicated  History Diabetes   Cholesterol Screening Once every 5 years if you don't have a lipid disorder. May order more often based on risk factors. Lipid panel: 04/22/2024    Screening Not Indicated  History Lipid Disorder     Other Preventive Screenings Covered by Medicare:  Abdominal Aortic Aneurysm (AAA) Screening: covered once if your at risk. You're considered to be at risk if you have a family history of AAA.  Lung Cancer Screening: covers low dose CT scan once per year if you meet all of the following conditions: (1) Age 55-77; (2) No signs or symptoms of lung cancer; (3) Current smoker or have quit smoking within the last 15 years; (4) You have a tobacco smoking history of at least 20 pack years (packs per day multiplied by number of years you smoked); (5) You get a written order from a healthcare provider.  Glaucoma Screening: covered annually if you're considered high risk: (1) You have diabetes OR (2) Family history of glaucoma OR (3)  aged 50 and older OR (4)  American aged 65 and older  Osteoporosis Screening: covered every 2 years if you meet one of the following conditions: (1) You're estrogen deficient and at risk for osteoporosis based off medical history and other findings; (2) Have a vertebral abnormality; (3) On glucocorticoid therapy  for more than 3 months; (4) Have primary hyperparathyroidism; (5) On osteoporosis medications and need to assess response to drug therapy.   Last bone density test (DXA Scan): 08/22/2024.  HIV Screening: covered annually if you're between the age of 15-65. Also covered annually if you are younger than 15 and older than 65 with risk factors for HIV infection. For pregnant patients, it is covered up to 3 times per pregnancy.    Immunizations:  Immunization Recommendations   Influenza Vaccine Annual influenza vaccination during flu season is recommended for all persons aged >= 6 months who do not have contraindications   Pneumococcal Vaccine   * Pneumococcal conjugate vaccine = PCV13 (Prevnar 13), PCV15 (Vaxneuvance), PCV20 (Prevnar 20)  * Pneumococcal polysaccharide vaccine = PPSV23 (Pneumovax) Adults 19-63 yo with certain risk factors or if 65+ yo  If never received any pneumonia vaccine: recommend Prevnar 20 (PCV20)  Give PCV20 if previously received 1 dose of PCV13 or PPSV23   Hepatitis B Vaccine 3 dose series if at intermediate or high risk (ex: diabetes, end stage renal disease, liver disease)   Respiratory syncytial virus (RSV) Vaccine - COVERED BY MEDICARE PART D  * RSVPreF3 (Arexvy) CDC recommends that adults 60 years of age and older may receive a single dose of RSV vaccine using shared clinical decision-making (SCDM)   Tetanus (Td) Vaccine - COST NOT COVERED BY MEDICARE PART B Following completion of primary series, a booster dose should be given every 10 years to maintain immunity against tetanus. Td may also be given as tetanus wound prophylaxis.   Tdap Vaccine - COST NOT COVERED BY MEDICARE PART B Recommended at least once for all adults. For pregnant patients, recommended with each pregnancy.   Shingles Vaccine (Shingrix) - COST NOT COVERED BY MEDICARE PART B  2 shot series recommended in those 19 years and older who have or will have weakened immune systems or those 50 years and older     Health  Maintenance Due:      Topic Date Due   • Hepatitis C Screening  Never done   • Lung Cancer Screening  05/07/2025   • Breast Cancer Screening: Mammogram  05/28/2025   • Colorectal Cancer Screening  06/20/2026     Immunizations Due:  There are no preventive care reminders to display for this patient.  Advance Directives   What are advance directives?  Advance directives are legal documents that state your wishes and plans for medical care. These plans are made ahead of time in case you lose your ability to make decisions for yourself. Advance directives can apply to any medical decision, such as the treatments you want, and if you want to donate organs.   What are the types of advance directives?  There are many types of advance directives, and each state has rules about how to use them. You may choose a combination of any of the following:  Living will:  This is a written record of the treatment you want. You can also choose which treatments you do not want, which to limit, and which to stop at a certain time. This includes surgery, medicine, IV fluid, and tube feedings.   Durable power of  for healthcare (DPAHC):  This is a written record that states who you want to make healthcare choices for you when you are unable to make them for yourself. This person, called a proxy, is usually a family member or a friend. You may choose more than 1 proxy.  Do not resuscitate (DNR) order:  A DNR order is used in case your heart stops beating or you stop breathing. It is a request not to have certain forms of treatment, such as CPR. A DNR order may be included in other types of advance directives.  Medical directive:  This covers the care that you want if you are in a coma, near death, or unable to make decisions for yourself. You can list the treatments you want for each condition. Treatment may include pain medicine, surgery, blood transfusions, dialysis, IV or tube feedings, and a ventilator (breathing  machine).  Values history:  This document has questions about your views, beliefs, and how you feel and think about life. This information can help others choose the care that you would choose.  Why are advance directives important?  An advance directive helps you control your care. Although spoken wishes may be used, it is better to have your wishes written down. Spoken wishes can be misunderstood, or not followed. Treatments may be given even if you do not want them. An advance directive may make it easier for your family to make difficult choices about your care.   Fall Prevention    Fall prevention  includes ways to make your home and other areas safer. It also includes ways you can move more carefully to prevent a fall. Health conditions that cause changes in your blood pressure, vision, or muscle strength and coordination may increase your risk for falls. Medicines may also increase your risk for falls if they make you dizzy, weak, or sleepy.   Fall prevention tips:   Stand or sit up slowly.    Use assistive devices as directed.    Wear shoes that fit well and have soles that .    Wear a personal alarm.    Stay active.    Manage your medical conditions.    Home Safety Tips:  Add items to prevent falls in the bathroom.    Keep paths clear.    Install bright lights in your home.    Keep items you use often on shelves within reach.    Paint or place reflective tape on the edges of your stairs.    Urinary Incontinence   Urinary incontinence (UI)  is when you lose control of your bladder. UI develops because your bladder cannot store or empty urine properly. The 3 most common types of UI are stress incontinence, urge incontinence, or both.  Medicines:   May be given to help strengthen your bladder control. Report any side effects of medication to your healthcare provider.  Do pelvic muscle exercises often:  Your pelvic muscles help you stop urinating. Squeeze these muscles tight for 5 seconds, then relax for 5  seconds. Gradually work up to squeezing for 10 seconds. Do 3 sets of 15 repetitions a day, or as directed. This will help strengthen your pelvic muscles and improve bladder control.  Train your bladder:  Go to the bathroom at set times, such as every 2 hours, even if you do not feel the urge to go. You can also try to hold your urine when you feel the urge to go. For example, hold your urine for 5 minutes when you feel the urge to go. As that becomes easier, hold your urine for 10 minutes.   Self-care:   Keep a UI record.  Write down how often you leak urine and how much you leak. Make a note of what you were doing when you leaked urine.  Drink liquids as directed. You may need to limit the amount of liquid you drink to help control your urine leakage. Do not drink any liquid right before you go to bed. Limit or do not have drinks that contain caffeine or alcohol.   Prevent constipation.  Eat a variety of high-fiber foods. Good examples are high-fiber cereals, beans, vegetables, and whole-grain breads. Walking is the best way to trigger your intestines to have a bowel movement.  Exercise regularly and maintain a healthy weight.  Weight loss and exercise will decrease pressure on your bladder and help you control your leakage.   Use a catheter as directed  to help empty your bladder. A catheter is a tiny, plastic tube that is put into your bladder to drain your urine.   Go to behavior therapy as directed.  Behavior therapy may be used to help you learn to control your urge to urinate.    Weight Management   Why it is important to manage your weight:  Being overweight increases your risk of health conditions such as heart disease, high blood pressure, type 2 diabetes, and certain types of cancer. It can also increase your risk for osteoarthritis, sleep apnea, and other respiratory problems. Aim for a slow, steady weight loss. Even a small amount of weight loss can lower your risk of health problems.  How to lose  weight safely:  A safe and healthy way to lose weight is to eat fewer calories and get regular exercise. You can lose up about 1 pound a week by decreasing the number of calories you eat by 500 calories each day.   Healthy meal plan for weight management:  A healthy meal plan includes a variety of foods, contains fewer calories, and helps you stay healthy. A healthy meal plan includes the following:  Eat whole-grain foods more often.  A healthy meal plan should contain fiber. Fiber is the part of grains, fruits, and vegetables that is not broken down by your body. Whole-grain foods are healthy and provide extra fiber in your diet. Some examples of whole-grain foods are whole-wheat breads and pastas, oatmeal, brown rice, and bulgur.  Eat a variety of vegetables every day.  Include dark, leafy greens such as spinach, kale, michelle greens, and mustard greens. Eat yellow and orange vegetables such as carrots, sweet potatoes, and winter squash.   Eat a variety of fruits every day.  Choose fresh or canned fruit (canned in its own juice or light syrup) instead of juice. Fruit juice has very little or no fiber.  Eat low-fat dairy foods.  Drink fat-free (skim) milk or 1% milk. Eat fat-free yogurt and low-fat cottage cheese. Try low-fat cheeses such as mozzarella and other reduced-fat cheeses.  Choose meat and other protein foods that are low in fat.  Choose beans or other legumes such as split peas or lentils. Choose fish, skinless poultry (chicken or turkey), or lean cuts of red meat (beef or pork). Before you cook meat or poultry, cut off any visible fat.   Use less fat and oil.  Try baking foods instead of frying them. Add less fat, such as margarine, sour cream, regular salad dressing and mayonnaise to foods. Eat fewer high-fat foods. Some examples of high-fat foods include french fries, doughnuts, ice cream, and cakes.  Eat fewer sweets.  Limit foods and drinks that are high in sugar. This includes candy, cookies,  regular soda, and sweetened drinks.  Exercise:  Exercise at least 30 minutes per day on most days of the week. Some examples of exercise include walking, biking, dancing, and swimming. You can also fit in more physical activity by taking the stairs instead of the elevator or parking farther away from stores. Ask your healthcare provider about the best exercise plan for you.   Narcotic (Opioid) Safety    Use narcotics safely:  Take prescribed narcotics exactly as directed  Do not give narcotics to others or take narcotics that belong to someone else  Do not mix narcotics without medicines or alcohol  Do not drive or operate heavy machinery after you take the narcotic  Monitor for side effects and notify your healthcare provider if you experienced side effects such as nausea, sleepiness, itching, or trouble thinking clearly.    Manage constipation:    Constipation is the most common side effect of narcotic medicine. Constipation is when you have hard, dry bowel movements, or you go longer than usual between bowel movements. Tell your healthcare provider about all changes in your bowel movements while you are taking narcotics. He or she may recommend laxative medicine to help you have a bowel movement. He or she may also change the kind of narcotic you are taking, or change when you take it. The following are more ways you can prevent or relieve constipation:    Drink liquids as directed.  You may need to drink extra liquids to help soften and move your bowels. Ask how much liquid to drink each day and which liquids are best for you.  Eat high-fiber foods.  This may help decrease constipation by adding bulk to your bowel movements. High-fiber foods include fruits, vegetables, whole-grain breads and cereals, and beans. Your healthcare provider or dietitian can help you create a high-fiber meal plan. Your provider may also recommend a fiber supplement if you cannot get enough fiber from food.  Exercise regularly.   Regular physical activity can help stimulate your intestines. Walking is a good exercise to prevent or relieve constipation. Ask which exercises are best for you.  Schedule a time each day to have a bowel movement.  This may help train your body to have regular bowel movements. Bend forward while you are on the toilet to help move the bowel movement out. Sit on the toilet for at least 10 minutes, even if you do not have a bowel movement.    Store narcotics safely:   Store narcotics where others cannot easily get them.  Keep them in a locked cabinet or secure area. Do not  keep them in a purse or other bag you carry with you. A person may be looking for something else and find the narcotics.  Make sure narcotics are stored out of the reach of children.  A child can easily overdose on narcotics. Narcotics may look like candy to a small child.    The best way to dispose of narcotics:      The laws vary by country and area. In the [place], the best way is to return the narcotics through a take-back program. This program is offered by the [place] Drug Enforcement Agency (NEGRA). The following are options for using the program:  Take the narcotics to a NEGRA collection site.  The site is often a law enforcement center. Call your local law enforcement center for scheduled take-back days in your area. You will be given information on where to go if the collection site is in a different location.  Take the narcotics to an approved pharmacy or hospital.  A pharmacy or hospital may be set up as a collection site. You will need to ask if it is a NEGRA collection site if you were not directed there. A pharmacy or doctor's office may not be able to take back narcotics unless it is a NEGRA site.  Use a mail-back system.  This means you are given containers to put the narcotics into. You will then mail them in the containers.  Use a take-back drop box.  This is a place to leave the narcotics at any time. People and animals will not be  able to get into the box. Your local law enforcement agency can tell you where to find a drop box in your area.    Other ways to manage pain:   Ask your healthcare provider about non-narcotic medicines to control pain.  Nonprescription medicines include NSAIDs (such as ibuprofen) and acetaminophen. Prescription medicines include muscle relaxers, antidepressants, and steroids.  Pain may be managed without any medicines.  Some ways to relieve pain include massage, aromatherapy, or meditation. Physical or occupational therapy may also help.    For more information:   Drug Enforcement Administration  [address] [place] [address]  Phone: 6- 393 - 128-4391  Web Address: https://www.deadiversion.[facility]gov/drug_disposal/    [place] Food and Drug Administration  [address] [place] [address]  Phone: 8- 039 - 140-7922  Web Address: http://www.fda.gov     © Copyright [employer] 2018 Information is for End User's use only and may not be sold, redistributed or otherwise used for commercial purposes. All illustrations and images included in CareNotes® are the copyrighted property of [employer] Inc[employer] or [employer]

## 2024-09-13 NOTE — ASSESSMENT & PLAN NOTE
Lab Results   Component Value Date    HGBA1C 6.7 (H) 06/25/2024       Orders:    Hemoglobin A1C; Future

## 2024-09-15 NOTE — ASSESSMENT & PLAN NOTE
Lab Results   Component Value Date    HGBA1C 6.7 (H) 06/25/2024       Orders:    Hemoglobin A1C; Future  Recheck A1c on 925.  Continue to watch starch in diet and push daily activity.  Continue current meds.  Discussed the lightheaded episodes during and right after cardiac rehab.  I feel this probably relates to low blood sugar and should make sure has snack before doing the cardiac rehab as she does this after she comes home from work

## 2024-09-15 NOTE — ASSESSMENT & PLAN NOTE
Recheck thyroid levels and adjust meds accordingly  Orders:    TSH, 3rd generation with Free T4 reflex; Future

## 2024-09-16 ENCOUNTER — CLINICAL SUPPORT (OUTPATIENT)
Dept: CARDIAC REHAB | Facility: CLINIC | Age: 64
End: 2024-09-16
Payer: COMMERCIAL

## 2024-09-16 DIAGNOSIS — I25.118 CORONARY ARTERY DISEASE OF NATIVE HEART WITH STABLE ANGINA PECTORIS, UNSPECIFIED VESSEL OR LESION TYPE (HCC): ICD-10-CM

## 2024-09-16 DIAGNOSIS — I50.22 CHRONIC SYSTOLIC HEART FAILURE (HCC): Primary | ICD-10-CM

## 2024-09-16 DIAGNOSIS — Z00.6 ENCOUNTER FOR EXAMINATION FOR NORMAL COMPARISON OR CONTROL IN CLINICAL RESEARCH PROGRAM: ICD-10-CM

## 2024-09-16 PROCEDURE — 93798 PHYS/QHP OP CAR RHAB W/ECG: CPT

## 2024-09-16 RX ORDER — ISOSORBIDE MONONITRATE 30 MG/1
30 TABLET, EXTENDED RELEASE ORAL DAILY
Qty: 90 TABLET | Refills: 5 | Status: SHIPPED | OUTPATIENT
Start: 2024-09-16 | End: 2026-03-10

## 2024-09-17 ENCOUNTER — OFFICE VISIT (OUTPATIENT)
Dept: OBGYN CLINIC | Facility: CLINIC | Age: 64
End: 2024-09-17
Payer: COMMERCIAL

## 2024-09-17 VITALS
WEIGHT: 217.6 LBS | HEIGHT: 68 IN | OXYGEN SATURATION: 100 % | BODY MASS INDEX: 32.98 KG/M2 | DIASTOLIC BLOOD PRESSURE: 70 MMHG | TEMPERATURE: 97.3 F | HEART RATE: 85 BPM | SYSTOLIC BLOOD PRESSURE: 124 MMHG

## 2024-09-17 DIAGNOSIS — Z87.828 HISTORY OF TORN MENISCUS OF RIGHT KNEE: ICD-10-CM

## 2024-09-17 DIAGNOSIS — M67.442 GANGLION CYST OF FINGER OF LEFT HAND: ICD-10-CM

## 2024-09-17 DIAGNOSIS — G89.29 CHRONIC PAIN OF RIGHT KNEE: Primary | ICD-10-CM

## 2024-09-17 DIAGNOSIS — M25.561 CHRONIC PAIN OF RIGHT KNEE: Primary | ICD-10-CM

## 2024-09-17 DIAGNOSIS — M17.11 PRIMARY OSTEOARTHRITIS OF RIGHT KNEE: ICD-10-CM

## 2024-09-17 PROCEDURE — 99213 OFFICE O/P EST LOW 20 MIN: CPT | Performed by: STUDENT IN AN ORGANIZED HEALTH CARE EDUCATION/TRAINING PROGRAM

## 2024-09-17 RX ORDER — DIPHENHYDRAMINE HCL 25 MG
25 CAPSULE ORAL EVERY 6 HOURS PRN
COMMUNITY

## 2024-09-17 NOTE — PROGRESS NOTES
1. Chronic pain of right knee  Injection Procedure Prior Authorization    CANCELED: Injection Procedure Prior Authorization      2. Ganglion cyst of finger of left hand  Ambulatory Referral to Orthopedic Surgery      3. Primary osteoarthritis of right knee  Injection Procedure Prior Authorization    CANCELED: Injection Procedure Prior Authorization      4. History of torn meniscus of right knee  Injection Procedure Prior Authorization    CANCELED: Injection Procedure Prior Authorization          Orders Placed This Encounter   Procedures    Ambulatory Referral to Orthopedic Surgery    Injection Procedure Prior Authorization        Imaging Studies (I personally reviewed images in PACS and report):      X-ray right knee 7/26/2024: Mild tricompartmental osteophytic changes evidence by marginal osteophytes.  Superior patellar enthesophyte from quadriceps tendon.  Small joint effusion.                IMPRESSION:  Chronic right knee pain  Of note h/o fall on right knee in 2017-she has an MRI report as noted above indicating osteoarthritic changes as well as medial/lateral meniscal tears  Pain have an aggravating initially after starting cardiac rehab due to chronic systolic heart failure.  Patient noting that she is trying to become more active and go on 5K walks.  However her right knee pain is a limiting factor as pain worsens with prolonged walking  Radiographic imaging notes degenerative changes  Differential includes osteoarthritic pain versus pain from her degenerative meniscal tears versus pes anserine syndrome  Patient reports that she was told in the past that she can no longer have cortisone injections due to the potential side effect of straining her heart given her history of chronic systolic heart failure  Separate issue addressed: Acute atraumatic left DIP joint pain, ganglion cyst formation over the past few weeks that been interfering with use of hand with activities of daily living    Other factors:  BMI  "32  Congestive heart failure - reportedly under restrictions of no strenuous activities and seeing cardiac rehab currently  History of defibrillator implant  Type 2 diabetes    PLAN:    Clinical exam and radiographic imaging reviewed with patient today, with impression as per above. I have discussed with the patient the pathophysiology of this diagnosis and reviewed how the examination correlates with this diagnosis.    Counseled patient that if she wanted to avoid cortisone due to side effects potentially exacerbating her cardiac comorbidities that we could pursue a viscosupplementation injection.  We had discussed this on last visit and patient is interested in trying this injection going forward.  Order placed today.  In regards to cortisone injections, patient is asking if I could reach out to her cardiologist thus I will send a message to Dr. Arabella reyes in regards to whether cortisone can be considered for the future.   Other treatment modalities reducing pain include use of a compression knee sleeve while walking, Tylenol, heat/ice therapy 20 minutes on/off.  I did offer referring to formal PT as well but patient deferred this option  Separately at the end of the visit, I also referred her to hand surgery in regards to the ganglion cyst of her left index finger DIP joint.  We did briefly discuss that aspirating the cyst would potentially result in a 50% chance of it regrowing again.  Patient states she would like to have it surgically removed if possible and referral to hand surgery will be placed    Return for Follow up with me after approval of gel injection for right knee; f/u w/ Dr Campos for lt finger pain.    Portions of the record may have been created with voice recognition software. Occasional wrong word or \"sound a like\" substitutions may have occurred due to the inherent limitations of voice recognition software. Read the chart carefully and recognize, using context, where substitutions have occurred. "     CHIEF COMPLAINT:  Right knee pain      HPI:  Vesna Langston is a 64 y.o. female  who presents for     Visit 9/17/2024:  Follow-up of ration of right knee pain  Of note history as per below.  Patient has an old MRI noting arthritic changes as well as medial/lateral meniscus tears in the past.  We had talked about a cortisone injection of her knee but at the time she was told her cardiologist to restrict her from prednisone/cortisone due to her cardiac disease history.  There was consideration for viscosupplementation injection of her knee but it was deferred at the time.  However, today patient states she is interested in pursuing the Visco injection for her right knee.  She states she is try to be more active and attend 5K walks but feels that with prolonged walking she has worsening medial/lateral aching/sharp pains, swelling, stiffness and has to rest frequently.  She does feel that use of the compression knee sleeve has helped mitigate some of the pain with activities.  She denies any new injuries of her right knee since last visit.  She reports continued crepitus of her knee.  She is asking whether I could reach out to her cardiologist to determine the risk of a cortisone injection for the future but would like to pursue a Visco injection first.    Separately, patient also wanted to address a left index finger pain.  She states she has noticed a localized cystic swelling along the side of her DIP joint.  She states that it has become more aggravating to do her craft activities at home due to the pain and restricted motion of her DIP joint.  She denies any discoloration or N/T.  She denies any injury of her index finger.  She had seen her PCP for this issue and was told it was a ganglion cyst and is here today to discuss whether there are treatments for this issue going forward there.  She states she has had prior fractures of her index finger in the past and thinks it may be due to the arthritis in her  finger.    Visit 7/26/2024 :  Initial evaluation of right knee pain  Of note, patient reports a prior work injury of her right knee in the past from a fall.  She did bring in an imaging study from 2017 of her MRI as noted above.  Images are not available to review today.  Reports she did not end up undergoing surgical interventions for her right knee and was treated conservatively with injections, bracing, physical therapy for period of time.  She states overall these interventions provided some relief but never felt that her pain was fully resolved.  Furthermore, she is currently undergoing cardiac rehab given her history of systolic congestive heart failure, h/o STEMI.  She states she is under a restriction of no strenuous activities.  She states she previously underwent cortisone injections but due to her cardiac condition, she was told that she can no longer receive cortisone/prednisone.  In regards to her right knee pain, she reports has been worsening over the past several weeks.  No precipitating injury.  She states the pain is mainly over the medial aspect of her knee.  Despite being on a limited activity restriction from cardiology, she feels it can be aggravated after performing stationary biking.  Describes the pain as a burning sensation of moderate to severe intensity depending on how active she is.  She denies any noticeable swelling.  Denies any discoloration.  Reports stiffness with knee range of motion's when aggravated.  Reports crepitus of her knee which is chronic.  In regards to treatment she reports use of Tylenol, icing and resting all of which provide some relief.  She is unsure if her prior injury/meniscal tears in the past are contributing factor as she does not want to undergo surgical intervention if possible.      Medical, Surgical, Family, and Social History    Past Medical History:   Diagnosis Date    Acute respiratory insufficiency 03/22/2024    Allergic     Chronic HFrEF (heart  failure with reduced ejection fraction) (HCC)     Coronary artery disease     COVID-19 virus infection 2022    Diabetes mellitus (HCC)     Disease of thyroid gland 2024    Hyperlipidemia     Hypoglycemia     ICD (implantable cardioverter-defibrillator) in place     Ischemic cardiomyopathy     Lactic acidosis 2024    Myocardial infarction (HCC) 3/22/2024    Otitis media 1966    ST elevation myocardial infarction involving left anterior descending (LAD) coronary artery (HCC) 2024    Tobacco abuse     Visual impairment 1967     Past Surgical History:   Procedure Laterality Date    ADENOIDECTOMY      APPENDECTOMY      APPENDECTOMY LAPAROSCOPIC N/A 2024    Procedure: APPENDECTOMY LAPAROSCOPIC;  Surgeon: Lauryn Ullrich, DO;  Location: [facility];  Service: General    BREAST EXCISIONAL BIOPSY Right 2000    cyst removed- benign    CARDIAC CATHETERIZATION N/A 2024    Procedure: Cardiac pci;  Surgeon: Gabriel Myers MD;  Location: [facility];  Service: Cardiology    CARDIAC CATHETERIZATION N/A 2024    Procedure: Cardiac Coronary Angiogram;  Surgeon: Gabriel Myers MD;  [facility];  Service: Cardiology    CARDIAC CATHETERIZATION N/A 2024    Procedure: Cardiac PCI AMI;  Surgeon: Gabriel Myers MD;  [facility];  Service: Cardiology    CARDIAC CATHETERIZATION N/A 2024    Procedure: Cardiac IVUS;  Surgeon: Gabriel Myers MD;  [facility];  Service: Cardiology    CARDIAC ELECTROPHYSIOLOGY PROCEDURE N/A 2024    Procedure: Cardiac icd implant;  Surgeon: Jeffy Lama MD;  [facility];  Service: Cardiology     SECTION      ECTOPIC PREGNANCY SURGERY      SEPTOPLASTY      SPINE SURGERY  23    TONSILLECTOMY       Social History   Social History     Substance and Sexual Activity   Alcohol Use Not Currently    Alcohol/week: 1.0 standard drink of alcohol    Types: 1 Shots of liquor per week     "Comment: rarely     Social History     Substance and Sexual Activity   Drug Use Never     Social History     Tobacco Use   Smoking Status Former    Current packs/day: 0.00    Average packs/day: 1 pack/day for 24.2 years (24.2 ttl pk-yrs)    Types: Cigarettes    Start date: 2000    Quit date: 3/22/2024    Years since quittin.4   Smokeless Tobacco Never   Tobacco Comments    Smoked 0.5-1 ppd; quit in 2024.      Family History   Adopted: Yes   Problem Relation Age of Onset    Depression Mother     Heart disease Father     OCD Daughter      Allergies   Allergen Reactions    Shellfish-Derived Products - Food Allergy Anaphylaxis    Azithromycin Rash          Physical Exam  /70 (BP Location: Left arm, Patient Position: Sitting, Cuff Size: Large)   Pulse 85   Temp (!) 97.3 °F (36.3 °C) (Temporal)   Ht 5' 8\" (1.727 m)   Wt 98.7 kg (217 lb 9.6 oz)   SpO2 100%   BMI 33.09 kg/m²     Constitutional:  see vital signs  Gen: well-developed, normocephalic/atraumatic, well-groomed  Eyes: No inflammation or discharge of conjunctiva or lids; sclera clear   Pulmonary/Chest: Effort normal. No respiratory distress.     Ortho Exam  Right Knee Exam:  Erythema: no  Swelling: no  Increased Warmth: no  Tenderness: +MJL, +LJL  ROM: 0-130  Knee flexion strength: 5/5  Knee extension strength: 5/5  Patellar Apprehension: negative  Patellar Grind: +  Varus laxity: negative  Valgus laxity: negative  Niurka: +mild discomfort of medial knee     No calf tenderness to palpation     Left index finger exam:  Inspection: There is a rounded protuberance/cystic lesion along the lateral aspect of her DIP joint.  There is no overlying erythema, ecchymosis, open wounds or drainage  Palpation: Positive tenderness along lesion and DIP joint.  Nontender along the PIP or MCP joint  ROM: Limited but intact ROM at the MCP/PIP/DIP due to reported pain and stiffness of the DIP joint    Procedures        "

## 2024-09-18 ENCOUNTER — CLINICAL SUPPORT (OUTPATIENT)
Dept: CARDIAC REHAB | Facility: CLINIC | Age: 64
End: 2024-09-18
Payer: COMMERCIAL

## 2024-09-18 DIAGNOSIS — I50.22 CHRONIC SYSTOLIC HEART FAILURE (HCC): Primary | ICD-10-CM

## 2024-09-18 PROCEDURE — 93798 PHYS/QHP OP CAR RHAB W/ECG: CPT

## 2024-09-18 NOTE — PROGRESS NOTES
CARDIAC REHABILITATION ASSESSMENT AND INDIVIDUALIZED TREATMENT PLAN  DISCHARGE        Today's date: 2024   # of Exercise Sessions Completed: 36  Patient name: Vesna Langston      : 1960  Age: 64 y.o.       MRN: [id number]  Referring Physician: Vitor Bell MD  Cardiologist: Vitor Bell MD  Provider: [place]  Clinician: Rachele Gallardo MS, EP      Comments:   Vesna is being discharged from Cardiac Rehab today. She has completed all 36 sessions and now exercises at a MET level range of 3.0-3.5 METs. She increased her 6MWT distance from an original 1075 feet to 1160 feet for an increase of 0.1 METs and did not need to use her cane throughout the duration of the final test. She increased her final DASI METs from 18.95 to 24.20 as well as decreased her PHQ-9 from 8 to 5 and decreased her BECCA-7 from 7 to 5 showing a positive change in the psychosocial aspect of her rehab program. Her resting BP ranges from 106-110/60-64 with a resting HR range of 68-78 bpm. Vesna has a normal hemodynamic response to exercise with a BP range of 124-146/62-68 and a HR range of  bpm. She has a paced rhythm with rare PVCs.     Vesna stated that she joined a gym in preparation for discharge and will be attending the gym as much as she can before or after work on days that she is not attending physical therapy. She attends physical therapy 3x a week for her back and will be trying to go to the gym 2-3x a week. She also stated that she watches her diet and is constantly up to date with her health and medication.      Dx:   Encounter Diagnosis   Name Primary?    Chronic systolic heart failure (HCC) Yes         Description of Diagnosis: S/p MI in 2024; received PCI to 100% stenosis of ostial/proximal LAD.   Date of onset: 3/22/24  Other Cardiac History: HFrEF, LVEF 30%; Atrial flutter; monomorphic ventricular tachycardia - S/p secondary prevention ICD (3/27/24)      ASSESSMENT    Medical History:   Past Medical History:    Diagnosis Date    Acute respiratory insufficiency 03/22/2024    Allergic     Chronic HFrEF (heart failure with reduced ejection fraction) ([facility])     Coronary artery disease     COVID-19 virus infection 11/28/2022    Diabetes mellitus ([facility])     Disease of thyroid gland 4/09/2024    Hyperlipidemia     Hypoglycemia     ICD (implantable cardioverter-defibrillator) in place     Ischemic cardiomyopathy     Lactic acidosis 03/22/2024    Myocardial infarction ([facility]) 3/22/2024    Otitis media 1966    ST elevation myocardial infarction involving left anterior descending (LAD) coronary artery ([facility]) 03/22/2024    Tobacco abuse     Visual impairment 1967       Family History:  Family History   Adopted: Yes   Problem Relation Age of Onset    Depression Mother     Heart disease Father     OCD Daughter        Allergies:   Shellfish-derived products - food allergy and Azithromycin    Current Medications:   Current Outpatient Medications   Medication Sig Dispense Refill    albuterol (ProAir HFA) 90 mcg/act inhaler Inhale 2 puffs every 6 (six) hours as needed for wheezing (Patient not taking: Reported on 9/17/2024) 8.5 g 0    amiodarone 200 mg tablet Take 1 tablet (200 mg total) by mouth daily 30 tablet 0    apixaban (ELIQUIS) 5 mg Take 1 tablet (5 mg total) by mouth 2 (two) times a day 60 tablet 5    atorvastatin (LIPITOR) 80 mg tablet TAKE 1 TABLET BY MOUTH EVERY EVENING 100 tablet 1    clopidogrel (PLAVIX) 75 mg tablet Take 1 tablet (75 mg total) by mouth daily 30 tablet 5    diphenhydrAMINE (BENADRYL) 25 mg capsule Take 25 mg by mouth every 6 (six) hours as needed for itching      Empagliflozin (Jardiance) 10 MG TABS tablet Take 1 tablet (10 mg total) by mouth every morning 30 tablet 17    furosemide (LASIX) 20 mg tablet Take 1 tablet (20 mg total) by mouth daily 30 tablet 17    gabapentin (Neurontin) 100 mg capsule Take 1 capsule (100 mg total) by mouth 2 (two) times a day (Patient taking differently: Take 100 mg by mouth 3  (three) times a day)      HYDROcodone-acetaminophen (NORCO) 5-325 mg per tablet Take 1 tablet by mouth every 6 (six) hours as needed for pain for up to 10 doses Max Daily Amount: 4 tablets 10 tablet 0    isosorbide mononitrate (IMDUR) 30 mg 24 hr tablet Take 1 tablet (30 mg total) by mouth daily 90 tablet 5    levothyroxine (Synthroid) 75 mcg tablet Take 1 tablet (75 mcg total) by mouth daily 90 tablet 3    meclizine (ANTIVERT) 25 mg tablet Take 1 tablet (25 mg total) by mouth every 8 (eight) hours as needed for dizziness (Patient not taking: Reported on 9/17/2024) 30 tablet 0    metoprolol succinate (TOPROL-XL) 25 mg 24 hr tablet Take 2 tablets (50 mg total) by mouth daily at bedtime 180 tablet 5    Multiple Vitamin (MULTIVITAMIN) capsule Take 1 capsule by mouth daily (Patient not taking: Reported on 8/27/2024)      pantoprazole (PROTONIX) 40 mg tablet TAKE 1 TABLET BY MOUTH 2 TIMES A DAY BEFORE MEALS. 60 tablet 3    sacubitril-valsartan (Entresto) 49-51 MG TABS Take 1 tablet by mouth 2 (two) times a day 60 tablet 17    spironolactone (ALDACTONE) 25 mg tablet Take 1 tablet (25 mg total) by mouth daily 90 tablet 5     No current facility-administered medications for this visit.       Medication compliance: Yes   Comments: Pt reports to be compliant with medications    Physical Limitations: History of back surgery with complications, PT for 2 yrs now. Recent appendectomy, 10 lb weight restriction    Fall Risk: High   Comments: Reports a fall in the past 6 months (syncope x 4)    Cultural needs: None      CAD Risk Factors:  Cholesterol: Yes  HTN: No  DM: Type 2   Obesity: Yes   Inactivity: No, walks for 20-30 minutes every night       EXERCISE ASSESSMENT:     Initial Fitness Assessment: 6MWT:  Resting:    Resting:  BP: 120/72  HR: 60 bpm, Exercise:  BP: 146/74  HR: 108 bpm, METs:  2.6 (1,075 ft), ECG Summary: NSR, intermittent atrial pacing, Symptoms: very slightly labored breathing with faster walking, recovered  quickly, and Comments: walked holding cane due to recommendation from orthopedic surgeon       Discharge Fitness Assessment: 6MWT  1160 feet = 2.7 METs  Rest: HR 68, /64, SpO2 NA%, 0/10 SOB  Exercise: , /62, SpO2 NA%, 2/10 SOB  Recovery: HR 77, BP 86/56, SpO2 NA%, 0/10 SOB    Functional Status Prior to Diagnosis for Treatment:   Occupation: part time job  at [employer]  Recreation/Physical Activity: gardening, cooking  ADL’s: limited by Orthopedic limitations   Pepin: able to perform self-care  Home exercise equipment:  None  Home exercise: walking a lot at work    Current Functional Status:  Occupation: part time work -  4 hours/day - [employer]  Recreation/Physical Activity: lawn mowing, weedwacker and leaf blowing (5-7 lb); watching grandchildren; cross-stitch; craft wreaths  ADL’s:Capable of performing light ADLs only limited by Orthopedic limitations   Pepin: able to perform self-care  Home exercise: walking 1 mile every night, starting at the gym next week for 2-3 days a week.    Functional Capacity Screening Tool:  [facility] Activity Status Index:  5.72 METs      PSYCHOSOCIAL ASSESSMENT:    Depression screening:  PHQ-9 = 5   Interpretation:  5-9 = Mild Depression  Anxiety screening:  BECCA-7 = 5  Interpretation: 5-9 = Mild anxiety    Pt self-report of depression and anxiety   Patient reports feelings of depression   Reports sufficient emotional support and is compliant with medical therapy (potentially short term)      Self-reported stress level:  5      Due to work and health concerns.      Quality of Life Screen:  (Higher score indicates disease impact on QOL)  [facility] COOP score: 21/40        Social Support:   children, grandchildren, neighbors, and friends     Psychosocial Assessment as it relates to rehabilitation:   Patient denies issues with his/her family or home life that may affect their rehabilitation efforts.       NUTRITION ASSESSMENT:    Rate Your Plate  Score: 73/81    Diabetes: T2D, Patient monitors BS 1 times/day, Patient reported fasting -125  A1c: 6.7%    last measured: 6/25/2024  Has a future HBA1c scheduled in [facility].     Lipid management: Last lipid profile 4/22/24  Chol 142    HDL 45  LDL 70  Has a future lipid panel scheduled in [facility].    Current Dietary Habits:  Reducing sodium, increased fresh fruit and vegetables      OTHER CORE COMPONENT ASSESSMENT:    Tobacco Use:     Pt quit 3/22/24   and has abstained for 6 months     Anginal Symptoms:  SOB   NTG use: No prescription        INDIVIDUALIZED TREATMENT PLAN    See outlined plan of care below for specific patient goals in each component of care.        EXERCISE GOAL and PLAN    SMART Goals:   improved DASI score by 10%  increased exercise capacity by 40% based on peak METs tolerated in cardiac rehab exercise session  maintain > 150 minutes per week of moderate intensity exercise  improved 6MWT distance by 10%    Patient Specific EXERCISE GOALS:       attend rehab regularly, start a home exercise program, and improve mobility and exercise tolerance, join [employer], 5K walk in Oct    Progress toward SMART and personal Exercise goals:  pt walking 1 mile 3 days/week, going to PT 2 days/week; is back to work part time, compliant with cardiac rehab exercise, increased exercise intensity in cardiac rehab  Goals Met:  improved DASI score by 10%  maintain > 150 minutes per week of moderate intensity exercise      Plan for next 30 days:    home exercise 30+ mins 2 days opposite CR and continue with gradual increase in exercise intensity and walking distance - prep for 5k walk in October    The patient was counseled on exercise guidelines to achieve a minimum of 150 mins/wk of moderate intensity (RPE 4-6)   exercise and encouraged to add 1-2 days of exercise on opposite days of cardiac rehab as tolerated.     Current Aerobic Exercise Prescription:      Frequency: 3 days/week   Supplement with home  exercise 2+ days/wk as tolerated       Minutes: 45-50 min         METS: 3-3.7          HR:  bpm   RPE: 4-6         Modalities: Treadmill, UBE, Lifecycle, Elliptical, NuStep, and Recumbent bike     Exercise workloads will be progressed gradually as tolerated, within limits of patient's ability, and according to the patient's   response to the exercise program.      Aerobic Exercise Prescription- Discharge    Frequency:  4-5 days       Minutes: 50-60      >150 mins/wk of moderate intensity exercise   METS: 3.8-4.5   HR:  RHR +30-40bpm ( bpm)/107-126 bpm (50-70% HRR)     RPE: 4-6   Modalities: Treadmill, UBE, Lifecycle, NuStep, Recumbent bike, and Room walking    Strength trainin-3 days / week  12-15 repetitions  1-2 sets per modality   With 10 lb restriction with very gradual increases approved with PT   Modalities: Leg Press, Chest Press, Lateral Raise, Arm Extension, Arm Curl, Front Raises, Shoulder Shrugs, and Calf Raises    Home Exercise: Type: walking, Frequency: 5-7 days/week, Distance 1 mile                   Will be starting a gym schedule next week, attend on days that she is not going to PT         Stated she will be doing what she was doing in CR for an hour and increase as needed.     Group and Individual Education: benefit of exercise for CAD risk factors, home exercise guidelines, AHA guidelines to achieve >150 mins/wk of moderate exercise, RPE scale, and Group class: Risk Factors for Heart Disease     Readiness to change: Action:  (Changing behavior)      NUTRITION GOALS AND PLAN    Weight control:    Starting weight: 209.8 lb   Current weight: 213 lb    SMART Goals:   reduced BMI to < 25, reduced waist circumference <35 inches (F), fasting BG , and Improved Rate Your Plate score  >64    Patient Specific NUTRITION GOALS:     30 lb weight loss    Patient's progress toward SMART and personal Nutrition goals:   Pt gained 2 lbs, is reading her food labels and follow a heart healthy  diet.Will check waist circumference upon discharge in 2 weeks    Plan for next 30 days:   increase daily intake of fruits and vegetables and rarely/never eat salty snacks (no seafood - allergic)    Group and Individual Education:   heart healthy eating principles  low sodium diet  nutrition for  lipid management  exercise and diabetes management   group class: Heart Healthy Eating  group class:  Label Reading    Readiness to change: Maintenance: (Maintaining the behavior change)      PSYCHOSOCIAL ASSESSMENT AND PLAN    Psychosocial Assessment as it relates to rehabilitation:   Patient denies issues with his/her family or home life that may affect their rehabilitation efforts.     SMART Goals:     Reduce perceived stress to 1-3/10 and PHQ-9 - reduced severity by one level    Patient Specific PSYCHOCOSOCIAL GOALS:     maintain compliance with medical therapy, spend time with family, and vacation with family end of July    Patient's progress toward SMART and personal Psychosocial goals:   Goals met: vacation with family end of July and Pt denies symptoms of depression or anxiety and manages stress well   PHQ-9 - reduced severity by one level    Plan for next 30 days:   Exercise, Spend time outdoors, Keep a positive mindset, Enjoy family, and Return to previous social activity    Group and Individual Education: signs/sxs of depression, benefits of a positive support system, depression and CAD, and class:  Stress and Your Health     Readiness to change: Maintenance: (Maintaining the behavior change)      OTHER CORE COMPONENTS GOALS and PLAN    Tobacco Intervention:   Pt quit 3/22/24 and has abstained since quitting.      SMART Goals:   medication compliance    Patient Specific CORE COMPONENT GOALS:    Improved EF%    Progress toward SMART and personal Core Component goals:    Continues to abstain from smoking, normal resting and exercise BP, pt is fully compliant with medications    Plan for next 30 days:   group class:  Common Heart Medications, medication compliance, engage in regular exercise, check labels for sodium content, and monitor home BP    Group and Individual Education:  low sodium diet and heart failure, identifying smoking triggers, and group class: Understanding Heart Disease    Readiness to change: Action:  (Changing behavior)

## 2024-09-19 ENCOUNTER — OFFICE VISIT (OUTPATIENT)
Dept: SLEEP CENTER | Facility: CLINIC | Age: 64
End: 2024-09-19
Payer: COMMERCIAL

## 2024-09-19 VITALS
DIASTOLIC BLOOD PRESSURE: 60 MMHG | OXYGEN SATURATION: 96 % | WEIGHT: 215.2 LBS | HEART RATE: 72 BPM | TEMPERATURE: 97.5 F | BODY MASS INDEX: 32.61 KG/M2 | HEIGHT: 68 IN | RESPIRATION RATE: 16 BRPM | SYSTOLIC BLOOD PRESSURE: 98 MMHG

## 2024-09-19 DIAGNOSIS — M67.442 GANGLION CYST OF FINGER OF LEFT HAND: Primary | ICD-10-CM

## 2024-09-19 DIAGNOSIS — F51.01 PRIMARY INSOMNIA: ICD-10-CM

## 2024-09-19 DIAGNOSIS — R06.83 SNORING: ICD-10-CM

## 2024-09-19 DIAGNOSIS — I25.5 ISCHEMIC CARDIOMYOPATHY: Chronic | ICD-10-CM

## 2024-09-19 DIAGNOSIS — G47.19 EXCESSIVE DAYTIME SLEEPINESS: Primary | ICD-10-CM

## 2024-09-19 DIAGNOSIS — I50.20 HFREF (HEART FAILURE WITH REDUCED EJECTION FRACTION) (HCC): ICD-10-CM

## 2024-09-19 DIAGNOSIS — E11.9 TYPE 2 DIABETES MELLITUS WITHOUT COMPLICATION, WITHOUT LONG-TERM CURRENT USE OF INSULIN (HCC): ICD-10-CM

## 2024-09-19 PROCEDURE — 99244 OFF/OP CNSLTJ NEW/EST MOD 40: CPT | Performed by: STUDENT IN AN ORGANIZED HEALTH CARE EDUCATION/TRAINING PROGRAM

## 2024-09-19 NOTE — PROGRESS NOTES
[facility]  Sleep Medicine New Patient Evaluation    PATIENT NAME: Vesna Langston  DATE OF SERVICE: September 19, 2024    CONSULTING PROVIDER:  Vitor Bell MD  [address] [place] [id number]      Assessment/Plan:  1. Excessive daytime sleepiness  Split Study      2. Ischemic cardiomyopathy  Ambulatory Referral to Sleep Medicine    Split Study      3. Snoring  Split Study      4. Primary insomnia  Split Study      5. Type 2 diabetes mellitus without complication, without long-term current use of insulin ([facility])  Split Study      6. HFrEF (heart failure with reduced ejection fraction) ([facility])  Split Study         Vesna is a very pleasant 64-year-old woman  with PMHx of CAD status post MI with stent and ICD placement and subsequent ischemic cardiomyopathy and HFrEF (EF 30% 5/2024), atrial flutter on Eliquis, chronic low back pain who presents for sleep disordered breathing evaluation.  Certainly, she has several symptoms of this along with her extensive cardiac history/comorbidities, thus evaluation for and treatment of sleep disordered breathing if present is merited.  Of note, it also appears that she has an advanced sleep schedule, however this is not bothersome to her at this time.    Discussed with patient the pathophysiology of OSAS and medical conditions associated with OSAS such as DM, HTN, CAD, Depression, Stroke, Headache.  Treatment options including surgery, Dental appliances, positional therapy, and CPAP were discussed.  I have ordered a split-night polysomnogram to evaluate for sleep-disordered breathing and the most effective PAP pressure setting.  Given her heart failure and intermittent opioid use, I would also like to ensure there are no additional central apneic events not controlled with standard PAP therapy, which a split-night study will help address.  Advised patient to avoid activities that could harm self or others when tired/sleepy, including driving.  Encouraged  maintaining normal weight  Discussed importance of good sleep hygiene.  Pending the results of the sleep study, we will plan to order CPAP with a subsequent compliance follow-up.  She will Return for Follow-up pending results of sleep study..    Thank you for allowing me to participate in the care of your patient.  If there are any questions regarding evaluation please feel free to reach out.       ________________________________________________________________________________________________    HPI: Vesna Langston is a 64 y.o. female with PMHx of CAD status post MI with stent and ICD placement and subsequent ischemic cardiomyopathy and HFrEF (EF 30% 2024), atrial flutter on Eliquis, chronic low back pain who presents for sleep disordered breathing evaluation.    Sleep-Related History:     Dr. Bell (cardiology) recommended she undergo a sleep study due to her heart disease.    She does endorse significant daytime sleepiness, including at times while driving. She endorses that she has some sleepiness that is more episodic in nature, and was told that her heart was a potential cause, however does have ongoing.chronic daytime sleepiness at baseline.     She states that she doesn't sleep well at night; sleeps for 3 hours, then wakes up. PCP wonders if part of it was related to her sugars, recommended eating a little more often. Has helped during the day.       Diagonal Sleepiness Scale:  What are your chances of dozing?   0= no chance  1= slight chance  2= moderate chance  3= high chance    Sitting and readin   Watching TV: 3  Sitting, inactive in a public place (e.g. a theatre or a meeting):1  As a passenger in a car for an hour without a break: 3  Lying down to rest in the afternoon when circumstances permit: 2   Sitting and talking to someone: 0  Sitting quietly after a lunch without alcohol: 1  In a car, while stopped for a few minutes in the traffic: 2       TOTAL  14/24  Greater or equal to 10 is positive for  excessive daytime sleepiness        SLEEP-WAKE SCHEDULE  She is self-described as a morning person.  Sleep Schedule:  Weekdays:  Bedtime: 1900/2000   Asleep in usually ~1 hour; during that time is tossing/turning. Relaxed but body doesn't let her go to sleep.   -Tylenol PM did help with going back to sleep, but felt really groggy the next morning.   Nighttime awakenings: 3-4; unknown     Wake: 0200 still tired.    Naps: If she is tired; for 30 minutes up to 3 hours.     Average total sleep time (in a 24 hour period): ~6-7 hours.    Weekends:   Same    Sleep-Related Details:  Preferred Sleep Position: side(s)  SDB Symptoms: snoring, gasping/choking, multiple awakenings, dry mouth/nose, and waking unrefreshed  Bruxism: No  Nocturnal GERD: Yes, meds for it  Nocturnal Palpitations: Yes, in the past, prior to heart attack; not since ICD placement.  Nocturnal Anxiety or Rumination: No  Sleep-Related Hallucinations: No   Sleep Paralysis: No       She denies any parasomnia activity.    Wake-Related Details:  Daytime Sleepiness: Yes,      Work Schedule: [id number] (4 hour shifts due to severe back injury, 2 screws currently lose in her back after prior surgery)  Drowsy Driving: Yes, just one time. Pulled over, rested, felt better.  Caffeine: Yes, 1 cup in morning  Alcohol Use: Not since December  Substance Use: No  Tobacco Use: No; quit 3/2024    She does not have difficulty with memory or concentration.      PAST TREATMENTS:  -None    PRIOR SLEEP STUDIES:  -None    OTHER RELEVANT LABS AND STUDIES:  -Echocardiogram 5/7/2024: EF 30%    Past Medical History:   Diagnosis Date    Acute respiratory insufficiency 03/22/2024    Allergic     Chronic HFrEF (heart failure with reduced ejection fraction) (HCC)     Coronary artery disease     COVID-19 virus infection 11/28/2022    Diabetes mellitus (HCC)     Disease of thyroid gland 4/09/2024    Hyperlipidemia     Hypoglycemia     ICD (implantable cardioverter-defibrillator) in place      Ischemic cardiomyopathy     Lactic acidosis 2024    Myocardial infarction (HCC) 3/22/2024    Otitis media 1966    ST elevation myocardial infarction involving left anterior descending (LAD) coronary artery (HCC) 2024    Tobacco abuse     Visual impairment 1967      Past Surgical History:   Procedure Laterality Date    ADENOIDECTOMY      APPENDECTOMY      APPENDECTOMY LAPAROSCOPIC N/A 2024    Procedure: APPENDECTOMY LAPAROSCOPIC;  Surgeon: Lauryn Ullrich, DO;  Location: [facility];  Service: General    BREAST EXCISIONAL BIOPSY Right 2000    cyst removed- benign    CARDIAC CATHETERIZATION N/A 2024    Procedure: Cardiac pci;  Surgeon: Gabriel Myers MD;  [facility];  Service: Cardiology    CARDIAC CATHETERIZATION N/A 2024    Procedure: Cardiac Coronary Angiogram;  Surgeon: Gabriel Myers MD;  [facility];  Service: Cardiology    CARDIAC CATHETERIZATION N/A 2024    Procedure: Cardiac PCI AMI;  Surgeon: Gabriel Myers MD;  [facility];  Service: Cardiology    CARDIAC CATHETERIZATION N/A 2024    Procedure: Cardiac IVUS;  Surgeon: Gabriel Myers MD;  [facility];  Service: Cardiology    CARDIAC ELECTROPHYSIOLOGY PROCEDURE N/A 2024    Procedure: Cardiac icd implant;  Surgeon: Jeffy Lama MD;  [facility];  Service: Cardiology     SECTION      ECTOPIC PREGNANCY SURGERY      SEPTOPLASTY      SPINE SURGERY  23    TONSILLECTOMY        Patient Active Problem List   Diagnosis    Atrial flutter, paroxysmal ([facility])    Hyperlipemia    New onset type 2 diabetes mellitus ([facility])    Tobacco abuse    Ischemic cardiomyopathy    Chronic low back pain    HFrEF (heart failure with reduced ejection fraction) ([facility])    S/P ICD (internal cardiac defibrillator) procedure    Back pain    Sciatica of left side    Syncope    Guaiac positive stools    History of sustained ventricular tachycardia    Coronary  artery disease involving native coronary artery of native heart    S/P coronary artery stent placement    Coffee ground emesis    Type 2 diabetes mellitus, without long-term current use of insulin (HCC)    Constipation    Anemia due to acute blood loss    Acquired hypothyroidism    Generalized anxiety disorder    Acute pulmonary embolism without acute cor pulmonale (HCC)    History of gastric ulcer    Acute appendicitis with localized peritonitis and gangrene, without perforation or abscess    Ganglion cyst of finger of left hand    Obesity, morbid (HCC)      Allergies as of 09/19/2024 - Reviewed 09/19/2024   Allergen Reaction Noted    Shellfish-derived products - food allergy Anaphylaxis 06/10/2019    Azithromycin Rash 10/10/2012         Review of Symptoms:    Review of Systems  10-point system review completed, all of which are negative except as mentioned above.    CURRENT MEDICATIONS:  Current Outpatient Medications   Medication Instructions    albuterol (ProAir HFA) 90 mcg/act inhaler 2 puffs, Inhalation, Every 6 hours PRN    amiodarone 200 mg, Oral, Daily    apixaban (ELIQUIS) 5 mg, Oral, 2 times daily    atorvastatin (LIPITOR) 80 mg, Oral, Every evening    clopidogrel (PLAVIX) 75 mg, Oral, Daily    diphenhydrAMINE (BENADRYL) 25 mg, Oral, Every 6 hours PRN    Empagliflozin (JARDIANCE) 10 mg, Oral, Every morning    furosemide (LASIX) 20 mg, Oral, Daily    gabapentin (NEURONTIN) 100 mg, Oral, 2 times daily    HYDROcodone-acetaminophen (NORCO) 5-325 mg per tablet 1 tablet, Oral, Every 6 hours PRN    isosorbide mononitrate (IMDUR) 30 mg, Oral, Daily    levothyroxine (SYNTHROID) 75 mcg, Oral, Daily    meclizine (ANTIVERT) 25 mg, Oral, Every 8 hours PRN    metoprolol succinate (TOPROL-XL) 50 mg, Oral, Daily at bedtime    Multiple Vitamin (MULTIVITAMIN) capsule 1 capsule, Daily    pantoprazole (PROTONIX) 40 mg, Oral, 2 times daily before meals    sacubitril-valsartan (Entresto) 49-51 MG TABS 1 tablet, Oral, 2 times  "daily    spironolactone (ALDACTONE) 25 mg, Oral, Daily         SOCIAL HISTORY:  Social History     Tobacco Use    Smoking status: Former     Current packs/day: 0.00     Average packs/day: 1 pack/day for 24.2 years (24.2 ttl pk-yrs)     Types: Cigarettes     Start date: 2000     Quit date: 3/22/2024     Years since quittin.4    Smokeless tobacco: Never    Tobacco comments:     Smoked 0.5-1 ppd; quit in 2024.    Vaping Use    Vaping status: Never Used   Substance Use Topics    Alcohol use: Not Currently     Alcohol/week: 1.0 standard drink of alcohol     Types: 1 Shots of liquor per week     Comment: rarely    Drug use: Never       FAMILY HISTORY:  Family History   Adopted: Yes   Problem Relation Age of Onset    Depression Mother     Heart disease Father     OCD Daughter             PHYSICAL EXAMINATION:  Vital Signs:  BP 98/60 (BP Location: Left arm, Patient Position: Sitting, Cuff Size: Large)   Pulse 72   Temp 97.5 °F (36.4 °C) (Temporal)   Resp 16   Ht 5' 8\" (1.727 m)   Wt 97.6 kg (215 lb 3.2 oz)   SpO2 96%   BMI 32.72 kg/m²  Body mass index is 32.72 kg/m².    Constitutional: NAD, well appearing   Mental Status: AAOx3  Neck Circumference: Neck Circumference: 15 inches  Skin: Warm, dry, no rashes noted   Eyes: PERRL, normal conjunctiva  ENT: Nasal congestion absent  Posterior Airspace:   S[place]o Tongue Position: 4  Retrognathia: absent  Overbite: present  High Arched Palate: present  Tongue Scalloping/Ridging: absent  Uvula: normal  Chest: No evidence of respiratory distress, no accessory muscle use; no evidence of peripheral cyanosis  Abdomen: Soft, NT/ND  Extremities: No digital clubbing or pedal edema    NEUROLOGICAL EXAM:  General: Awake, alert, speech fluent, comprehension, naming, repetition intact. Short and long term memory intact.  CN: PERRL, EOMI without nystagmus, facial sensation and strength are normal and symmetric, hearing is intact to conversational tone, palate and tongue " movements are intact and symmetric.  Motor: Normal tone, bulk and strength bilaterally.   Sensation: LT intact throughout.  Gait: Able to walk without difficulty. Stance normal.          Electronically signed by:    Genaro Caceres DO  Board-certified Neurology and Sleep Medicine  [facility]  09/19/24

## 2024-09-19 NOTE — PATIENT INSTRUCTIONS
POP Evaluation:    I suspect you may have sleep apnea.  Sleep apnea is a serious sleep disorder that occurs when a person's breathing is interrupted during sleep. People with untreated sleep apnea stop breathing repeatedly during their sleep, sometimes hundreds of times during the night.  The most common type of sleep apnea is obstructive sleep apnea.  If left untreated, obstructive sleep apnea can result in a number of health problems including hypertension, stroke, arrhythmias, cardiomyopathy (enlargement of the muscle tissue of the heart), heart failure, diabetes, obesity, and heart attacks. It has also been found to make chronic medical conditions (such as high blood pressure, migraine headaches, and seizures (more difficult to manage.  Treatment options for obstructive sleep apnea may include positive airway pressure (PAP) therapy, oral appliance, hypoglossal nerve stimulator, nose/throat surgery, weight loss, side sleeping, or avoidance of medications or substances that can relax the airway muscles (alcohol, benzodiazepines, and opioids).  I have ordered an in-lab polysomnogram (PSG) to evaluate for sleep apnea.  This test should be scheduled at your earliest convenience.   Sleep Clinic: This should be scheduled at the  before you leave today.  Avoid driving if drowsy. We recommend that if you are dozing off while driving, that you do not drive until your sleepiness is appropriately treated.  We encourage a healthy lifestyle with adequate sleep (7-9 hours per night), diet and exercise.  Recommend good sleep hygiene, as outlined below    Myself and/or my team will reach out to you via MyChart and/or phone call with the results and next steps.      Good Sleep Hygiene  Wake up at the same time every day, even on the weekends.  Use your bed for sleep and intimacy only.  If you have been in bed awake for 30 minutes, get up and leave the bedroom. Choose a dull activity not involving a blue screen (TV,  computer, handheld devices). Go back to bed when you feel sleepy.  Avoid caffeine, nicotine and alcohol before you go to bed.  Avoid large meals before you go to bed.  Avoid using screens (computers, tablets, smartphones, etc.) for at least 1 hour before bedtime  Exercise regularly, but do not exercise right before you go to bed.  Avoid daytime naps. If you do take a nap, sleep for 20-40 minutes, and not after dinner.

## 2024-09-20 ENCOUNTER — HOSPITAL ENCOUNTER (OUTPATIENT)
Dept: RADIOLOGY | Facility: CLINIC | Age: 64
End: 2024-09-20
Payer: COMMERCIAL

## 2024-09-20 ENCOUNTER — OFFICE VISIT (OUTPATIENT)
Dept: OBGYN CLINIC | Facility: CLINIC | Age: 64
End: 2024-09-20
Payer: COMMERCIAL

## 2024-09-20 VITALS
WEIGHT: 216.8 LBS | OXYGEN SATURATION: 98 % | HEART RATE: 73 BPM | DIASTOLIC BLOOD PRESSURE: 58 MMHG | TEMPERATURE: 97.3 F | SYSTOLIC BLOOD PRESSURE: 102 MMHG | BODY MASS INDEX: 32.96 KG/M2

## 2024-09-20 DIAGNOSIS — M67.442 GANGLION CYST OF FINGER OF LEFT HAND: ICD-10-CM

## 2024-09-20 PROCEDURE — 99203 OFFICE O/P NEW LOW 30 MIN: CPT | Performed by: STUDENT IN AN ORGANIZED HEALTH CARE EDUCATION/TRAINING PROGRAM

## 2024-09-20 PROCEDURE — 73140 X-RAY EXAM OF FINGER(S): CPT

## 2024-09-20 NOTE — PROGRESS NOTES
ASSESSMENT/PLAN:    Assessment:   Mass of the left index finger  Dupuytren's nodule over the left ring finger pretendinous cord at the distal palmar crease    Plan:   -Obtain an ultrasound of the index finger to determine the etiology of the mass  -Follow-up after ultrasound to discuss results and treatment options  -Monitor and observe the Dupuytren's nodule    Follow Up:  After Testing    To Do Next Visit:  Discussed ultrasound results    General Discussions:     Evaluating her hand she has a bilobed firm nominal mobile mass on the ulnar aspect of the volar DIP joint of the left index finger.  She has underlying arthritis in this joint and in all likelihood this represents a ganglion cyst of the joint.  However it is slightly atypical in nature is most mucous cysts present dorsally.  A retinacular cyst is also possible however this would be more midline in the finger.  Finally the mass did not appear to transilluminate on illumination testing therefore I cannot definitively say it is a cyst.  To rule out other etiologies I will obtain an ultrasound to confirm it is cystic in nature.  I discussed with the patient that development of cysts at the joint.  We discussed that they arise due to underlying arthritis and a weak point in the joint capsule with fluid extravasation.  We also discussed that there could be other etiologies present and we need to rule those out before anything can be done in terms of surgical resection.  The patient wants the mass removed because it is affecting her daily function, causing her significant pain with any pressure over the site, and has essentially made it impossible for her to flex her DIP joint given the masses spanning the DIP joint.  I agreed with her that due to these limitations surgery is a viable option we just need to confirm that it is a cyst before it is removed.  She will return next week with her ultrasound results and if surgery is indicated we will discuss the risks  and benefits.    _____________________________________________________  CHIEF COMPLAINT:  Left index finger mass        SUBJECTIVE:  Vesna Langston is a 64 y.o. female who presents for evaluation of a left index finger mass.  She was previously seen by Dr. Sanchez who confirmed the diagnosis of a ganglion from the DIP joint.  Per the patient her symptoms are focused on finger pain at the DIP joint as well as the deep formation of the mass itself.  She first noticed the mass about 3 months ago and has been gradually growing in size.  The mass is focused over the DIP joint and as such she states has completely limited her ability to flex the finger.  It is painful with any sort of gripping activities and painful with any pressure over that site.  This is limiting her ability to work.  She works at [employer] in the [address] and this is limiting her work function.  The pain itself is a sharp pain in the DIP joint which is aggravated by activities she does at home such as crafts and any motion of the DIP joint.  She does note that she had a fracture of this left index DIP joint in the past and thinks it may have contributed to her pain.  She does not have any associated numbness or tingling and does not notice these masses elsewhere in the hand.  She has not previously had any treatments for the mass including no attempted aspiration or surgical resection.  Radiation: None  Previous Treatments: None   Associated symptoms: Stiffness/LROM  Handedness: right  Work status: [employer]     PAST MEDICAL HISTORY:  Past Medical History:   Diagnosis Date    Acute respiratory insufficiency 03/22/2024    Allergic     Chronic HFrEF (heart failure with reduced ejection fraction) (HCC)     Coronary artery disease     COVID-19 virus infection 11/28/2022    Diabetes mellitus (HCC)     Disease of thyroid gland 4/09/2024    Hyperlipidemia     Hypoglycemia     ICD (implantable cardioverter-defibrillator) in place     Ischemic  cardiomyopathy     Lactic acidosis 2024    Myocardial infarction (HCC) 3/22/2024    Otitis media 1966    ST elevation myocardial infarction involving left anterior descending (LAD) coronary artery (HCC) 2024    Tobacco abuse     Visual impairment 1967       PAST SURGICAL HISTORY:  Past Surgical History:   Procedure Laterality Date    ADENOIDECTOMY      APPENDECTOMY      APPENDECTOMY LAPAROSCOPIC N/A 2024    Procedure: APPENDECTOMY LAPAROSCOPIC;  Surgeon: Lauryn Ullrich, DO;  [facility]: [facility];  Service: General    BREAST EXCISIONAL BIOPSY Right 2000    cyst removed- benign    CARDIAC CATHETERIZATION N/A 2024    Procedure: Cardiac pci;  Surgeon: Gabriel Myers MD;  [facility];  Service: Cardiology    CARDIAC CATHETERIZATION N/A 2024    Procedure: Cardiac Coronary Angiogram;  Surgeon: Gabriel Myers MD;  [facility];  Service: Cardiology    CARDIAC CATHETERIZATION N/A 2024    Procedure: Cardiac PCI AMI;  Surgeon: Gabriel Myers MD;  [facility];  Service: Cardiology    CARDIAC CATHETERIZATION N/A 2024    Procedure: Cardiac IVUS;  Surgeon: Gabriel Myers MD;  [facility];  Service: Cardiology    CARDIAC ELECTROPHYSIOLOGY PROCEDURE N/A 2024    Procedure: Cardiac icd implant;  Surgeon: Jeffy Lmaa MD;  [facility];  Service: Cardiology     SECTION      ECTOPIC PREGNANCY SURGERY      SEPTOPLASTY      SPINE SURGERY  23    TONSILLECTOMY         FAMILY HISTORY:  Family History   Adopted: Yes   Problem Relation Age of Onset    Depression Mother     Heart disease Father     OCD Daughter        SOCIAL HISTORY:  Social History     Tobacco Use    Smoking status: Former     Current packs/day: 0.00     Average packs/day: 1 pack/day for 24.2 years (24.2 ttl pk-yrs)     Types: Cigarettes     Start date: 2000     Quit date: 3/22/2024     Years since quittin.4    Smokeless  tobacco: Never    Tobacco comments:     Smoked 0.5-1 ppd; quit in 03/2024.    Vaping Use    Vaping status: Never Used   Substance Use Topics    Alcohol use: Not Currently     Alcohol/week: 1.0 standard drink of alcohol     Types: 1 Shots of liquor per week     Comment: rarely    Drug use: Never       MEDICATIONS:    Current Outpatient Medications:     albuterol (ProAir HFA) 90 mcg/act inhaler, Inhale 2 puffs every 6 (six) hours as needed for wheezing (Patient not taking: Reported on 9/17/2024), Disp: 8.5 g, Rfl: 0    amiodarone 200 mg tablet, Take 1 tablet (200 mg total) by mouth daily, Disp: 30 tablet, Rfl: 0    apixaban (ELIQUIS) 5 mg, Take 1 tablet (5 mg total) by mouth 2 (two) times a day, Disp: 60 tablet, Rfl: 5    atorvastatin (LIPITOR) 80 mg tablet, TAKE 1 TABLET BY MOUTH EVERY EVENING, Disp: 100 tablet, Rfl: 1    clopidogrel (PLAVIX) 75 mg tablet, Take 1 tablet (75 mg total) by mouth daily, Disp: 30 tablet, Rfl: 5    diphenhydrAMINE (BENADRYL) 25 mg capsule, Take 25 mg by mouth every 6 (six) hours as needed for itching, Disp: , Rfl:     Empagliflozin (Jardiance) 10 MG TABS tablet, Take 1 tablet (10 mg total) by mouth every morning, Disp: 30 tablet, Rfl: 17    furosemide (LASIX) 20 mg tablet, Take 1 tablet (20 mg total) by mouth daily, Disp: 30 tablet, Rfl: 17    gabapentin (Neurontin) 100 mg capsule, Take 1 capsule (100 mg total) by mouth 2 (two) times a day (Patient taking differently: Take 100 mg by mouth 3 (three) times a day), Disp: , Rfl:     HYDROcodone-acetaminophen (NORCO) 5-325 mg per tablet, Take 1 tablet by mouth every 6 (six) hours as needed for pain for up to 10 doses Max Daily Amount: 4 tablets, Disp: 10 tablet, Rfl: 0    isosorbide mononitrate (IMDUR) 30 mg 24 hr tablet, Take 1 tablet (30 mg total) by mouth daily, Disp: 90 tablet, Rfl: 5    levothyroxine (Synthroid) 75 mcg tablet, Take 1 tablet (75 mcg total) by mouth daily, Disp: 90 tablet, Rfl: 3    meclizine (ANTIVERT) 25 mg tablet, Take 1  tablet (25 mg total) by mouth every 8 (eight) hours as needed for dizziness (Patient not taking: Reported on 9/17/2024), Disp: 30 tablet, Rfl: 0    metoprolol succinate (TOPROL-XL) 25 mg 24 hr tablet, Take 2 tablets (50 mg total) by mouth daily at bedtime, Disp: 180 tablet, Rfl: 5    Multiple Vitamin (MULTIVITAMIN) capsule, Take 1 capsule by mouth daily (Patient not taking: Reported on 8/27/2024), Disp: , Rfl:     pantoprazole (PROTONIX) 40 mg tablet, TAKE 1 TABLET BY MOUTH 2 TIMES A DAY BEFORE MEALS., Disp: 60 tablet, Rfl: 3    sacubitril-valsartan (Entresto) 49-51 MG TABS, Take 1 tablet by mouth 2 (two) times a day, Disp: 60 tablet, Rfl: 17    spironolactone (ALDACTONE) 25 mg tablet, Take 1 tablet (25 mg total) by mouth daily, Disp: 90 tablet, Rfl: 5    ALLERGIES:  Allergies   Allergen Reactions    Shellfish-Derived Products - Food Allergy Anaphylaxis    Azithromycin Rash       REVIEW OF SYSTEMS:  Pertinent items are noted in HPI.  A comprehensive review of systems was negative.    LABS:  HgA1c:   Lab Results   Component Value Date    HGBA1C 6.7 (H) 06/25/2024     BMP:   Lab Results   Component Value Date    GLUCOSE 168 (H) 04/07/2024    CALCIUM 10.1 09/10/2024    K 4.8 09/10/2024    CO2 29 09/10/2024     09/10/2024    BUN 18 09/10/2024    CREATININE 0.98 09/10/2024         _____________________________________________________  PHYSICAL EXAMINATION:  Vital signs: There were no vitals taken for this visit.  General: well developed and well nourished, alert, oriented times 3, and appears comfortable  Psychiatric: Normal  HEENT: Trachea Midline, No torticollis  Cardiovascular: No discernable arrhythmia  Pulmonary: No wheezing or stridor  Abdomen: No rebound or guarding  Extremities: No peripheral edema  Skin: No masses, erythema, lacerations, fluctation, ulcerations  Neurovascular: Sensation Intact to the Median, Ulnar, Radial Nerve, Motor Intact to the Median, Ulnar, Radial Nerve, and Pulses  Intact    MUSCULOSKELETAL EXAMINATION:  Left hand  Evaluated the palm of her left hand there is a nodule over the pretendinous cord of the ring finger approximately at the distal palmar crease.  This is consistent with a Dupuytren's nodule.  There is no flexion deformity or contracture associated with this and she has no functional limitations from this.  There is no tenderness with palpation of this nodule  There is a mass over the volar ulnar aspect of the left index finger DIP joint.  This spans the DIP joint, is centered over the DIP joint, and is approximately a centimeter and a half in length by half a centimeter in width.  The mass is bilobed, firm, and nonmobile.  The mass does not translate with flexion extension.  The mass is ulnar to midline.  There is no overlying erythema.  There were no other masses in the hand.  There is no overlying skin changes associated with the mass.  Palpation of the mass it is slightly tender and notably with Tinel of the mass she has some slight electrical sensation at the tip of the finger.  Skin is warm and dry to touch with no signs of erythema, ecchymosis, or infection  Full FDS, FDP, extensor mechanisms are intact  No rotational deformity with composite finger flexion  Demonstrates normal wrist, elbow, and shoulder motion  Forearm compartments are soft and supple  2+ distal radial pulse with brisk capillary refill to the fingers  Radial, median, and ulnar motor and sensory distribution intact  Sensations light to touch intact distally     _____________________________________________________  STUDIES REVIEWED:  Images were reviewed in PACS by Dr. Campos and demonstrate: Arthritic changes of the DIP joints which include joint space narrowing and mild osteophytes.

## 2024-09-21 DIAGNOSIS — I25.5 ISCHEMIC CARDIOMYOPATHY: Chronic | ICD-10-CM

## 2024-09-21 DIAGNOSIS — R55 SYNCOPE AND COLLAPSE: ICD-10-CM

## 2024-09-22 ENCOUNTER — HOSPITAL ENCOUNTER (OUTPATIENT)
Dept: SLEEP CENTER | Facility: CLINIC | Age: 64
Discharge: HOME | End: 2024-09-22
Payer: COMMERCIAL

## 2024-09-22 DIAGNOSIS — G47.19 EXCESSIVE DAYTIME SLEEPINESS: ICD-10-CM

## 2024-09-22 DIAGNOSIS — R06.83 SNORING: ICD-10-CM

## 2024-09-22 DIAGNOSIS — E11.9 TYPE 2 DIABETES MELLITUS WITHOUT COMPLICATION, WITHOUT LONG-TERM CURRENT USE OF INSULIN (HCC): ICD-10-CM

## 2024-09-22 DIAGNOSIS — I50.20 HFREF (HEART FAILURE WITH REDUCED EJECTION FRACTION) (HCC): ICD-10-CM

## 2024-09-22 DIAGNOSIS — F51.01 PRIMARY INSOMNIA: ICD-10-CM

## 2024-09-22 DIAGNOSIS — I25.5 ISCHEMIC CARDIOMYOPATHY: Chronic | ICD-10-CM

## 2024-09-22 PROCEDURE — 95811 POLYSOM 6/>YRS CPAP 4/> PARM: CPT | Performed by: STUDENT IN AN ORGANIZED HEALTH CARE EDUCATION/TRAINING PROGRAM

## 2024-09-22 PROCEDURE — 95811 POLYSOM 6/>YRS CPAP 4/> PARM: CPT

## 2024-09-23 ENCOUNTER — PATIENT MESSAGE (OUTPATIENT)
Dept: SLEEP CENTER | Facility: CLINIC | Age: 64
End: 2024-09-23

## 2024-09-23 PROBLEM — G47.33 OSA (OBSTRUCTIVE SLEEP APNEA): Status: ACTIVE | Noted: 2024-09-23

## 2024-09-23 NOTE — PROGRESS NOTES
Progress Note -     Name: Vesna Langston 64 y.o. female I MRN: [id number]  Unit/Bed#:  I Date of Admission: 9/22/2024   Date of Service: 9/23/2024 I Hospital Day: 0     Assessment & Plan  Ischemic cardiomyopathy    Snoring    Excessive daytime sleepiness    Primary insomnia    Type 2 diabetes mellitus without complication, without long-term current use of insulin ([facility])    Lab Results   Component Value Date    HGBA1C 6.7 (H) 06/25/2024     HFrEF (heart failure with reduced ejection fraction) ([facility])  Wt Readings from Last 3 Encounters:   09/20/24 98.3 kg (216 lb 12.8 oz)   09/19/24 97.6 kg (215 lb 3.2 oz)   09/17/24 98.7 kg (217 lb 9.6 oz)                   Sleep Study Documentation    Pre-Sleep Study       Sleep testing procedure explained to patient:YES    Patient napped prior to study:NO    Caffeine: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  Caffeine use:YES- soda  12 ounces    Alcohol:Dayshift workers after 5PM: Alcohol use:NO    Typical day for patient:YES       Study Documentation    Sleep Study Indications: snoring, falling asleep while driving    Sleep Study: Split Optimal PAP pressure: 11  Leak:Small  Snore:Eliminated  REM Obtained:yes  Supplemental O2: no    Minimum SaO2 89  Baseline SaO2 94  PAP mask tried (list all)Jeff and Paykel nasal Eson 2  PAP mask choice (final)same  PAP mask type:nasal  PAP pressure at which snoring was eliminated 11  Minimum SaO2 at final PAP pressure 93  Mode of Therapy:CPAP  ETCO2:No  CPAP changed to BiPAP:No    Mode of Therapy:CPAP    EKG abnormalities: no     EEG abnormalities: no    Were abnormal behaviors in sleep observed:NO    Is Total Sleep Study Recording Time < 2 hours: N/A    Is Total Sleep Study Recording Time > 2 hours but study is incomplete: N/A    Is Total Sleep Study Recording Time 6 hours or more but sleep was not obtained: NO    Patient classification: employed       Post-Sleep Study    Medication used at bedtime or during sleep study:YES other prescription  medications    Patient reports time it took to fall asleep:less than 20 minutes    Patient reports waking up during study:1 to 2 times.  Patient reports returning to sleep in 10 to 30 minutes.    Patient reports sleeping 6 to 8 hours without dreaming.    Does the Patient feel this is a typical night of sleep:better than usual    Patient rated sleepiness: Not sleepy or tired    PAP treatment:yes: Post PAP treatment patient reports feeling better and  would wear PAP mask at home.

## 2024-09-23 NOTE — ASSESSMENT & PLAN NOTE
Wt Readings from Last 3 Encounters:   09/20/24 98.3 kg (216 lb 12.8 oz)   09/19/24 97.6 kg (215 lb 3.2 oz)   09/17/24 98.7 kg (217 lb 9.6 oz)

## 2024-09-24 RX ORDER — AMIODARONE HYDROCHLORIDE 200 MG/1
200 TABLET ORAL DAILY
Qty: 90 TABLET | Refills: 0 | Status: SHIPPED | OUTPATIENT
Start: 2024-09-24 | End: 2024-12-23

## 2024-09-27 ENCOUNTER — APPOINTMENT (OUTPATIENT)
Dept: LAB | Facility: HOSPITAL | Age: 64
End: 2024-09-27
Payer: COMMERCIAL

## 2024-09-27 DIAGNOSIS — E78.5 HYPERLIPIDEMIA, UNSPECIFIED HYPERLIPIDEMIA TYPE: ICD-10-CM

## 2024-09-27 DIAGNOSIS — E11.9 NEW ONSET TYPE 2 DIABETES MELLITUS (HCC): ICD-10-CM

## 2024-09-27 DIAGNOSIS — Z00.6 ENCOUNTER FOR EXAMINATION FOR NORMAL COMPARISON OR CONTROL IN CLINICAL RESEARCH PROGRAM: ICD-10-CM

## 2024-09-27 DIAGNOSIS — E03.9 ACQUIRED HYPOTHYROIDISM: ICD-10-CM

## 2024-09-27 DIAGNOSIS — E11.9 TYPE 2 DIABETES MELLITUS WITHOUT COMPLICATION, WITHOUT LONG-TERM CURRENT USE OF INSULIN (HCC): Primary | ICD-10-CM

## 2024-09-27 LAB
CHOLEST SERPL-MCNC: 194 MG/DL
EST. AVERAGE GLUCOSE BLD GHB EST-MCNC: 160 MG/DL
HBA1C MFR BLD: 7.2 %
HDLC SERPL-MCNC: 57 MG/DL
LDLC SERPL CALC-MCNC: 103 MG/DL (ref 0–100)
NONHDLC SERPL-MCNC: 137 MG/DL
T4 FREE SERPL-MCNC: 0.91 NG/DL (ref 0.61–1.12)
TRIGL SERPL-MCNC: 168 MG/DL
TSH SERPL DL<=0.05 MIU/L-ACNC: 5.57 UIU/ML (ref 0.45–4.5)

## 2024-09-27 PROCEDURE — 36415 COLL VENOUS BLD VENIPUNCTURE: CPT

## 2024-09-27 PROCEDURE — 83036 HEMOGLOBIN GLYCOSYLATED A1C: CPT

## 2024-09-27 PROCEDURE — 84443 ASSAY THYROID STIM HORMONE: CPT

## 2024-09-27 PROCEDURE — 84439 ASSAY OF FREE THYROXINE: CPT

## 2024-09-27 PROCEDURE — 80061 LIPID PANEL: CPT

## 2024-09-27 RX ORDER — GLIMEPIRIDE 1 MG/1
1 TABLET ORAL
Qty: 30 TABLET | Refills: 5 | Status: SHIPPED | OUTPATIENT
Start: 2024-09-27 | End: 2025-03-26

## 2024-09-30 ENCOUNTER — OFFICE VISIT (OUTPATIENT)
Dept: URGENT CARE | Facility: CLINIC | Age: 64
End: 2024-09-30
Payer: COMMERCIAL

## 2024-09-30 ENCOUNTER — PATIENT MESSAGE (OUTPATIENT)
Dept: SLEEP CENTER | Facility: CLINIC | Age: 64
End: 2024-09-30

## 2024-09-30 VITALS
SYSTOLIC BLOOD PRESSURE: 106 MMHG | WEIGHT: 216 LBS | DIASTOLIC BLOOD PRESSURE: 64 MMHG | HEIGHT: 68 IN | BODY MASS INDEX: 32.74 KG/M2 | RESPIRATION RATE: 16 BRPM | OXYGEN SATURATION: 98 % | HEART RATE: 63 BPM | TEMPERATURE: 96.5 F

## 2024-09-30 DIAGNOSIS — H10.31 ACUTE BACTERIAL CONJUNCTIVITIS OF RIGHT EYE: Primary | ICD-10-CM

## 2024-09-30 PROCEDURE — G0382 LEV 3 HOSP TYPE B ED VISIT: HCPCS

## 2024-09-30 PROCEDURE — S9083 URGENT CARE CENTER GLOBAL: HCPCS

## 2024-09-30 RX ORDER — POLYMYXIN B SULFATE AND TRIMETHOPRIM 1; 10000 MG/ML; [USP'U]/ML
1 SOLUTION OPHTHALMIC EVERY 4 HOURS
Qty: 10 ML | Refills: 0 | Status: SHIPPED | OUTPATIENT
Start: 2024-09-30 | End: 2024-10-07

## 2024-09-30 RX ORDER — HYALURONATE SODIUM, STABILIZED 60 MG/3 ML
SYRINGE (ML) INTRAARTICULAR
COMMUNITY
Start: 2024-09-24

## 2024-09-30 NOTE — PATIENT INSTRUCTIONS
"Use eye drops as prescribed  Contagious for 24 hours  Good hand hygiene  Wash pillow cases  Discard eye makeup   Follow up with PCP in 3-5 days.  Proceed to  ER if symptoms worsen.    If tests have been performed at [facility], our office will contact you with results if changes need to be made to the care plan discussed with you at the visit.  You can review your full results on [facility]'s MyChart.    Patient Education     Conjunctivitis (pink eye)   The Basics   Written by the doctors and editors at [facility]   What is pink eye? -- Pink eye is a term people use to describe an infection or irritation of the eye. The medical term for pink eye is \"conjunctivitis.\"  If you have pink eye, your eye (or eyes) might:   Turn pink or red   Weep or ooze a gooey liquid   Become itchy or burn   Get stuck shut, especially when you first wake up  Pink eye can be caused by an infection, allergies, or an unknown irritation.  Can you catch pink eye from someone else? -- Yes. When pink eye is caused by an infection, it can spread easily. Usually, people catch it from touching something that has been in contact with an infected person's eye. It can also be spread when an infected person touches someone else, and then that person touches their eye.  If someone you know has pink eye, avoid touching their pillowcases, towels, or other personal items.  When should I see a doctor or nurse? -- See your doctor or nurse if your eye hurts, or if you still have trouble seeing clearly after blinking. If you do not have these problems, but think you might have pink eye, your doctor or nurse might be able to give you advice over the phone.  Can pink eye be treated? -- Most cases of pink eye go away on their own without treatment. But some types of pink eye can be treated.  When pink eye is caused by infection, it is usually caused by a virus, so antibiotics will not help. Still, pink eye caused by a virus can last several days.   Pink eye caused by " "an infection with bacteria can be treated with antibiotic eye drops, gel, or ointment.   Pink eye caused by other problems can be treated with eye drops normally used to treat allergies. These drops will not cure the pink eye, but they can help with itchiness and irritation.  When using eye drops for infection, do not touch your healthy eye after touching your infected eye. Also, do not touch the bottle or dropper directly onto 1 eye and then use it in the other. These things can cause the infection to spread from 1 eye to the other.  If your eyelids feel swollen, it might also help to hold a cool wet cloth on the area.  What if I wear contact lenses? -- If you wear contact lenses and you have symptoms of pink eye, it is really important to have a doctor look at your eyes. In people who wear contacts, the symptoms of pink eye can be caused by \"corneal abrasion.\" Corneal abrasion is a scratch on the eye and can be a serious problem.  During treatment for eye infections, you might need to stop wearing your contacts for a short time. If your contacts are disposable, throw them away and use new ones. If your contacts are not disposable, you need to carefully clean them. You should also throw away your contact lens case and get a new one.  When can I go back to work or school? -- If you have pink eye caused by an infection, remember that it can spread very easily. The best way to avoid spreading it is to stay away from other people until you no longer have symptoms. If this is not possible, wash your hands often (figure 1). It's also important to avoid touching your eyes and sharing items that could spread the infection.  Schools and day cares usually have rules about when a child with pink eye can return. If a child has a bacterial infection, they will probably need to stay home until they have gotten antibiotic eye drops or ointment for 24 hours.  Can pink eye be prevented? -- To keep from getting or spreading pink eye " caused by an infection:   Wash your hands often with soap and water.   Try not to touch your eyes.   Avoid sharing towels, bedding, or other personal items with a person who has pink eye.  If your pink eye is caused by allergies, it might help to stay inside with the windows shut as much as possible during peak allergy seasons.  What problems should I watch for? -- Call your doctor or nurse if:   You have trouble seeing clearly after blinking.   Your eye is still red or has drainage after 3 days.   You have eye pain that is getting worse.  All topics are updated as new evidence becomes available and our peer review process is complete.  This topic retrieved from [employer] on: Feb 28, 2024.  Topic 05656 Version 20.0  Release: 32.2.4 - C32.58  © 2024 UpToDate, Inc. and/or its affiliates. All rights reserved.  figure 1: How to wash your hands     Wet your hands with clean water, and apply a small amount of soap. Lather and rub hands together for at least 20 seconds. Clean your wrists, palms, backs of your hands, between your fingers, tips of your fingers, thumbs, and under and around your nails. Rinse well, and dry your hands using a clean towel.  Graphic 895118 Version 7.0  Consumer Information Use and Disclaimer   Disclaimer: This generalized information is a limited summary of diagnosis, treatment, and/or medication information. It is not meant to be comprehensive and should be used as a tool to help the user understand and/or assess potential diagnostic and treatment options. It does NOT include all information about conditions, treatments, medications, side effects, or risks that may apply to a specific patient. It is not intended to be medical advice or a substitute for the medical advice, diagnosis, or treatment of a health care provider based on the health care provider's examination and assessment of a patient's specific and unique circumstances. Patients must speak with a health care provider for complete  information about their health, medical questions, and treatment options, including any risks or benefits regarding use of medications. This information does not endorse any treatments or medications as safe, effective, or approved for treating a specific patient. UpToDate, Inc. and its affiliates disclaim any warranty or liability relating to this information or the use thereof.The use of this information is governed by the Terms of Use, available at https://www.[employer].com/en/know/clinical-effectiveness-terms. 2024© UpToDate, Inc. and its affiliates and/or licensors. All rights reserved.  Copyright   © 2024 UpToDate, Inc. and/or its affiliates. All rights reserved.

## 2024-09-30 NOTE — PROGRESS NOTES
"  [facility]        NAME: Vesna Langston is a 64 y.o. female  : 1960    MRN: [id number]  DATE: 2024  TIME: 10:04 AM    Assessment and Plan   Acute bacterial conjunctivitis of right eye [H10.31]  1. Acute bacterial conjunctivitis of right eye  polymyxin b-trimethoprim (POLYTRIM) ophthalmic solution        VA performed by nursing staff:Corrected Right Eye 20/25, Left Eye 20/25, Bilateral Eyes 20/25.  Clinical findings concerning for early onset bacterial conjunctivitis and will treat with Polytrim.  Contagious. Encouraged continued supportive measures.  Follow up with PCP in 3-5 days or proceed to emergency department for worsening symptoms.  Patient verbalized understanding of instructions given.       Patient Instructions     Patient Instructions   Use eye drops as prescribed  Contagious for 24 hours  Good hand hygiene  Wash pillow cases  Discard eye makeup   Follow up with PCP in 3-5 days.  Proceed to  ER if symptoms worsen.    If tests have been performed at [facility], our office will contact you with results if changes need to be made to the care plan discussed with you at the visit.  You can review your full results on [facility] MyChart.    Patient Education     Conjunctivitis (pink eye)   The Basics   Written by the doctors and editors at [facility]   What is pink eye? -- Pink eye is a term people use to describe an infection or irritation of the eye. The medical term for pink eye is \"conjunctivitis.\"  If you have pink eye, your eye (or eyes) might:   Turn pink or red   Weep or ooze a gooey liquid   Become itchy or burn   Get stuck shut, especially when you first wake up  Pink eye can be caused by an infection, allergies, or an unknown irritation.  Can you catch pink eye from someone else? -- Yes. When pink eye is caused by an infection, it can spread easily. Usually, people catch it from touching something that has been in contact with an infected person's eye. It can also be spread " "when an infected person touches someone else, and then that person touches their eye.  If someone you know has pink eye, avoid touching their pillowcases, towels, or other personal items.  When should I see a doctor or nurse? -- See your doctor or nurse if your eye hurts, or if you still have trouble seeing clearly after blinking. If you do not have these problems, but think you might have pink eye, your doctor or nurse might be able to give you advice over the phone.  Can pink eye be treated? -- Most cases of pink eye go away on their own without treatment. But some types of pink eye can be treated.  When pink eye is caused by infection, it is usually caused by a virus, so antibiotics will not help. Still, pink eye caused by a virus can last several days.   Pink eye caused by an infection with bacteria can be treated with antibiotic eye drops, gel, or ointment.   Pink eye caused by other problems can be treated with eye drops normally used to treat allergies. These drops will not cure the pink eye, but they can help with itchiness and irritation.  When using eye drops for infection, do not touch your healthy eye after touching your infected eye. Also, do not touch the bottle or dropper directly onto 1 eye and then use it in the other. These things can cause the infection to spread from 1 eye to the other.  If your eyelids feel swollen, it might also help to hold a cool wet cloth on the area.  What if I wear contact lenses? -- If you wear contact lenses and you have symptoms of pink eye, it is really important to have a doctor look at your eyes. In people who wear contacts, the symptoms of pink eye can be caused by \"corneal abrasion.\" Corneal abrasion is a scratch on the eye and can be a serious problem.  During treatment for eye infections, you might need to stop wearing your contacts for a short time. If your contacts are disposable, throw them away and use new ones. If your contacts are not disposable, you need " to carefully clean them. You should also throw away your contact lens case and get a new one.  When can I go back to work or school? -- If you have pink eye caused by an infection, remember that it can spread very easily. The best way to avoid spreading it is to stay away from other people until you no longer have symptoms. If this is not possible, wash your hands often (figure 1). It's also important to avoid touching your eyes and sharing items that could spread the infection.  Schools and day cares usually have rules about when a child with pink eye can return. If a child has a bacterial infection, they will probably need to stay home until they have gotten antibiotic eye drops or ointment for 24 hours.  Can pink eye be prevented? -- To keep from getting or spreading pink eye caused by an infection:   Wash your hands often with soap and water.   Try not to touch your eyes.   Avoid sharing towels, bedding, or other personal items with a person who has pink eye.  If your pink eye is caused by allergies, it might help to stay inside with the windows shut as much as possible during peak allergy seasons.  What problems should I watch for? -- Call your doctor or nurse if:   You have trouble seeing clearly after blinking.   Your eye is still red or has drainage after 3 days.   You have eye pain that is getting worse.  All topics are updated as new evidence becomes available and our peer review process is complete.  This topic retrieved from [place] on: Feb 28, 2024.  Topic 71164 Version 20.0  Release: 32.2.4 - C32.58  © 2024 UpToDate, Inc. and/or its affiliates. All rights reserved.  figure 1: How to wash your hands     Wet your hands with clean water, and apply a small amount of soap. Lather and rub hands together for at least 20 seconds. Clean your wrists, palms, backs of your hands, between your fingers, tips of your fingers, thumbs, and under and around your nails. Rinse well, and dry your hands using a clean  lea.  Graphic 985839 Version 7.0  Consumer Information Use and Disclaimer   Disclaimer: This generalized information is a limited summary of diagnosis, treatment, and/or medication information. It is not meant to be comprehensive and should be used as a tool to help the user understand and/or assess potential diagnostic and treatment options. It does NOT include all information about conditions, treatments, medications, side effects, or risks that may apply to a specific patient. It is not intended to be medical advice or a substitute for the medical advice, diagnosis, or treatment of a health care provider based on the health care provider's examination and assessment of a patient's specific and unique circumstances. Patients must speak with a health care provider for complete information about their health, medical questions, and treatment options, including any risks or benefits regarding use of medications. This information does not endorse any treatments or medications as safe, effective, or approved for treating a specific patient. UpToDate, Inc. and its affiliates disclaim any warranty or liability relating to this information or the use thereof.The use of this information is governed by the Terms of Use, available at https://www.[employer].com/en/know/clinical-effectiveness-terms. 2024© UpToDate, Inc. and its affiliates and/or licensors. All rights reserved.  Copyright   © 2024 UpToDate, Inc. and/or its affiliates. All rights reserved.        Chief Complaint     Chief Complaint   Patient presents with    Eye Drainage     Right eye started tearing X 1 day. Today feels gritty and was stuck. shut on awakening . Right eye slightly pink  VA= both eyes 20/25, right eye 20/25, left eye 20/25         History of Present Illness       64-year-old female with a past medical history significant for CHF, CAD, type II DM, and hypertension presents with complaints of right eye redness and discharge x 1 day.  Reports  started as tearing but then developed gritty sensation with crusting upon awakening.  States some blurry vision.  Denies fever or URI symptoms.  No known sick contacts or exposures.  No known eye injury or trauma.  No known foreign body.  Does wear corrective lenses but not contact lenses. Unsure if related to Amiodarone.         Review of Systems   Review of Systems   Constitutional:  Negative for chills and fever.   HENT:  Negative for congestion, ear discharge, ear pain, rhinorrhea and sore throat.    Eyes:  Positive for pain, discharge, redness and visual disturbance. Negative for photophobia.   Respiratory:  Negative for cough, shortness of breath and wheezing.    Cardiovascular:  Negative for chest pain.   Gastrointestinal:  Negative for abdominal pain, diarrhea, nausea and vomiting.   Skin:  Negative for rash.   Neurological:  Negative for dizziness, light-headedness and headaches.         Current Medications       Current Outpatient Medications:     amiodarone 200 mg tablet, Take 1 tablet (200 mg total) by mouth daily, Disp: 90 tablet, Rfl: 0    apixaban (ELIQUIS) 5 mg, Take 1 tablet (5 mg total) by mouth 2 (two) times a day, Disp: 60 tablet, Rfl: 5    atorvastatin (LIPITOR) 80 mg tablet, TAKE 1 TABLET BY MOUTH EVERY EVENING, Disp: 100 tablet, Rfl: 1    clopidogrel (PLAVIX) 75 mg tablet, Take 1 tablet (75 mg total) by mouth daily, Disp: 30 tablet, Rfl: 5    Durolane 60 MG/3ML injection, , Disp: , Rfl:     Empagliflozin (Jardiance) 10 MG TABS tablet, Take 1 tablet (10 mg total) by mouth every morning, Disp: 90 tablet, Rfl: 1    furosemide (LASIX) 20 mg tablet, Take 1 tablet (20 mg total) by mouth daily, Disp: 30 tablet, Rfl: 17    glimepiride (AMARYL) 1 mg tablet, Take 1 tablet (1 mg total) by mouth daily with breakfast, Disp: 30 tablet, Rfl: 5    isosorbide mononitrate (IMDUR) 30 mg 24 hr tablet, Take 1 tablet (30 mg total) by mouth daily, Disp: 90 tablet, Rfl: 5    levothyroxine (Synthroid) 75 mcg tablet,  Take 1 tablet (75 mcg total) by mouth daily, Disp: 90 tablet, Rfl: 3    meclizine (ANTIVERT) 25 mg tablet, Take 1 tablet (25 mg total) by mouth every 8 (eight) hours as needed for dizziness, Disp: 30 tablet, Rfl: 0    metoprolol succinate (TOPROL-XL) 25 mg 24 hr tablet, Take 2 tablets (50 mg total) by mouth daily at bedtime, Disp: 180 tablet, Rfl: 5    pantoprazole (PROTONIX) 40 mg tablet, TAKE 1 TABLET BY MOUTH 2 TIMES A DAY BEFORE MEALS., Disp: 60 tablet, Rfl: 3    polymyxin b-trimethoprim (POLYTRIM) ophthalmic solution, Administer 1 drop to the right eye every 4 (four) hours for 7 days, Disp: 10 mL, Rfl: 0    sacubitril-valsartan (Entresto) 49-51 MG TABS, Take 1 tablet by mouth 2 (two) times a day, Disp: 60 tablet, Rfl: 17    spironolactone (ALDACTONE) 25 mg tablet, Take 1 tablet (25 mg total) by mouth daily, Disp: 90 tablet, Rfl: 5    albuterol (ProAir HFA) 90 mcg/act inhaler, Inhale 2 puffs every 6 (six) hours as needed for wheezing (Patient not taking: Reported on 9/17/2024), Disp: 8.5 g, Rfl: 0    diphenhydrAMINE (BENADRYL) 25 mg capsule, Take 25 mg by mouth every 6 (six) hours as needed for itching (Patient not taking: Reported on 9/30/2024), Disp: , Rfl:     gabapentin (Neurontin) 100 mg capsule, Take 1 capsule (100 mg total) by mouth 2 (two) times a day (Patient not taking: Reported on 9/30/2024), Disp: , Rfl:     HYDROcodone-acetaminophen (NORCO) 5-325 mg per tablet, Take 1 tablet by mouth every 6 (six) hours as needed for pain for up to 10 doses Max Daily Amount: 4 tablets (Patient not taking: Reported on 9/20/2024), Disp: 10 tablet, Rfl: 0    Multiple Vitamin (MULTIVITAMIN) capsule, Take 1 capsule by mouth daily (Patient not taking: Reported on 8/27/2024), Disp: , Rfl:     Current Allergies     Allergies as of 09/30/2024 - Reviewed 09/30/2024   Allergen Reaction Noted    Shellfish-derived products - food allergy Anaphylaxis 06/10/2019    Azithromycin Rash 10/10/2012            The following portions of  the patient's history were reviewed and updated as appropriate: allergies, current medications, past family history, past medical history, past social history, past surgical history and problem list.     Past Medical History:   Diagnosis Date    Acute respiratory insufficiency 03/22/2024    Allergic     Chronic HFrEF (heart failure with reduced ejection fraction) ([facility])     Coronary artery disease     COVID-19 virus infection 11/28/2022    Diabetes mellitus ([facility])     Disease of thyroid gland 4/09/2024    Hyperlipidemia     Hypoglycemia     ICD (implantable cardioverter-defibrillator) in place     Ischemic cardiomyopathy     Lactic acidosis 03/22/2024    Myocardial infarction ([facility]) 3/22/2024    Otitis media 1966    ST elevation myocardial infarction involving left anterior descending (LAD) coronary artery ([facility]) 03/22/2024    Tobacco abuse     Visual impairment 1967       Past Surgical History:   Procedure Laterality Date    ADENOIDECTOMY      APPENDECTOMY      APPENDECTOMY LAPAROSCOPIC N/A 05/08/2024    Procedure: APPENDECTOMY LAPAROSCOPIC;  Surgeon: Lauryn Ullrich, DO;  Location: [facility];  Service: General    BREAST EXCISIONAL BIOPSY Right 09/2000    cyst removed- benign    CARDIAC CATHETERIZATION N/A 03/22/2024    Procedure: Cardiac pci;  Surgeon: Gabriel Myers MD;  [facility];  Service: Cardiology    CARDIAC CATHETERIZATION N/A 03/22/2024    Procedure: Cardiac Coronary Angiogram;  Surgeon: Gabriel Myers MD;  [facility];  Service: Cardiology    CARDIAC CATHETERIZATION N/A 03/22/2024    Procedure: Cardiac PCI AMI;  Surgeon: Gabriel Myers MD;  [facility];  Service: Cardiology    CARDIAC CATHETERIZATION N/A 03/22/2024    Procedure: Cardiac IVUS;  Surgeon: Gabriel Myers MD;  [facility];  Service: Cardiology    CARDIAC ELECTROPHYSIOLOGY PROCEDURE N/A 03/27/2024    Procedure: Cardiac icd implant;  Surgeon: Jeffy Lama MD;  [facility]  [facility];  Service: Cardiology     SECTION      ECTOPIC PREGNANCY SURGERY      SEPTOPLASTY      SPINE SURGERY  23    TONSILLECTOMY         Family History   Adopted: Yes   Problem Relation Age of Onset    Depression Mother     Heart disease Father     OCD Daughter          Medications have been verified.        Objective   /64   Pulse 63   Temp (!) 96.5 °F (35.8 °C)   Resp 16   Ht 5' 8\" (1.727 m)   Wt 98 kg (216 lb)   SpO2 98%   BMI 32.84 kg/m²   No LMP recorded. Patient is postmenopausal.       Physical Exam     Physical Exam  Vitals and nursing note reviewed.   Constitutional:       General: She is not in acute distress.     Appearance: She is not toxic-appearing.   HENT:      Head: Normocephalic.      Right Ear: Tympanic membrane, ear canal and external ear normal.      Left Ear: Tympanic membrane, ear canal and external ear normal.      Nose: Nose normal.      Mouth/Throat:      Mouth: Mucous membranes are moist.      Pharynx: Oropharynx is clear.   Eyes:      General: Lids are normal. Vision grossly intact.         Right eye: No foreign body or discharge.         Left eye: No foreign body or discharge.      Extraocular Movements: Extraocular movements intact.      Conjunctiva/sclera:      Right eye: Right conjunctiva is injected.      Left eye: Left conjunctiva is not injected.      Pupils: Pupils are equal, round, and reactive to light.      Right eye: No corneal abrasion or fluorescein uptake.   Cardiovascular:      Rate and Rhythm: Normal rate and regular rhythm.      Heart sounds: Normal heart sounds.   Pulmonary:      Effort: Pulmonary effort is normal. No respiratory distress.      Breath sounds: Normal breath sounds. No stridor. No wheezing, rhonchi or rales.   Lymphadenopathy:      Cervical: No cervical adenopathy.   Skin:     General: Skin is warm and dry.   Neurological:      Mental Status: She is alert and oriented to person, place, and time.      Gait: Gait is intact. "   Psychiatric:         Mood and Affect: Mood normal.         Behavior: Behavior normal.

## 2024-09-30 NOTE — PROGRESS NOTES
" Nutrition Assessment Form    Patient Name: Vesna Langston    YOB: 1960    Sex: Female     Assessment Date: 9/30/2024  Start Time: 2:03 PM Stop Time: 3:03 PM Total Minutes: 60 min       Data:  Present at session: self   Parent/Patient Concerns/reason for visit: \"I need to lose weight, I've been down to 800 kcal per day before.\"    Medical Dx/Reason for Referral: Ischemic cardiomyopathy   Past Medical History:   Diagnosis Date    Acute respiratory insufficiency 03/22/2024    Allergic     Chronic HFrEF (heart failure with reduced ejection fraction) ([facility])     Coronary artery disease     COVID-19 virus infection 11/28/2022    Diabetes mellitus ([facility])     Disease of thyroid gland 4/09/2024    Hyperlipidemia     Hypoglycemia     ICD (implantable cardioverter-defibrillator) in place     Ischemic cardiomyopathy     Lactic acidosis 03/22/2024    Myocardial infarction ([facility]) 3/22/2024    Otitis media 1966    ST elevation myocardial infarction involving left anterior descending (LAD) coronary artery ([facility]) 03/22/2024    Tobacco abuse     Visual impairment 1967    Back surgery in March  MI in March 2024  Only able to work 4 h per day at work, on workmans comp due to back surgery, not able to lift more than 1 gal milk    ECHO scheduled for next week to assess HF   Current Outpatient Medications   Medication Sig Dispense Refill    albuterol (ProAir HFA) 90 mcg/act inhaler Inhale 2 puffs every 6 (six) hours as needed for wheezing (Patient not taking: Reported on 9/17/2024) 8.5 g 0    amiodarone 200 mg tablet Take 1 tablet (200 mg total) by mouth daily 90 tablet 0    apixaban (ELIQUIS) 5 mg Take 1 tablet (5 mg total) by mouth 2 (two) times a day 60 tablet 5    atorvastatin (LIPITOR) 80 mg tablet TAKE 1 TABLET BY MOUTH EVERY EVENING 100 tablet 1    clopidogrel (PLAVIX) 75 mg tablet Take 1 tablet (75 mg total) by mouth daily 30 tablet 5    diphenhydrAMINE (BENADRYL) 25 mg capsule Take 25 mg by mouth every 6 (six) hours as " "needed for itching (Patient not taking: Reported on 9/30/2024)      Durolane 60 MG/3ML injection       Empagliflozin (Jardiance) 10 MG TABS tablet Take 1 tablet (10 mg total) by mouth every morning 90 tablet 1    furosemide (LASIX) 20 mg tablet Take 1 tablet (20 mg total) by mouth daily 30 tablet 17    gabapentin (Neurontin) 100 mg capsule Take 1 capsule (100 mg total) by mouth 2 (two) times a day (Patient not taking: Reported on 9/30/2024)      glimepiride (AMARYL) 1 mg tablet Take 1 tablet (1 mg total) by mouth daily with breakfast 30 tablet 5    HYDROcodone-acetaminophen (NORCO) 5-325 mg per tablet Take 1 tablet by mouth every 6 (six) hours as needed for pain for up to 10 doses Max Daily Amount: 4 tablets (Patient not taking: Reported on 9/20/2024) 10 tablet 0    isosorbide mononitrate (IMDUR) 30 mg 24 hr tablet Take 1 tablet (30 mg total) by mouth daily 90 tablet 5    levothyroxine (Synthroid) 75 mcg tablet Take 1 tablet (75 mcg total) by mouth daily 90 tablet 3    meclizine (ANTIVERT) 25 mg tablet Take 1 tablet (25 mg total) by mouth every 8 (eight) hours as needed for dizziness 30 tablet 0    metoprolol succinate (TOPROL-XL) 25 mg 24 hr tablet Take 2 tablets (50 mg total) by mouth daily at bedtime 180 tablet 5    Multiple Vitamin (MULTIVITAMIN) capsule Take 1 capsule by mouth daily (Patient not taking: Reported on 8/27/2024)      pantoprazole (PROTONIX) 40 mg tablet TAKE 1 TABLET BY MOUTH 2 TIMES A DAY BEFORE MEALS. 60 tablet 3    polymyxin b-trimethoprim (POLYTRIM) ophthalmic solution Administer 1 drop to the right eye every 4 (four) hours for 7 days 10 mL 0    sacubitril-valsartan (Entresto) 49-51 MG TABS Take 1 tablet by mouth 2 (two) times a day 60 tablet 17    spironolactone (ALDACTONE) 25 mg tablet Take 1 tablet (25 mg total) by mouth daily 90 tablet 5     No current facility-administered medications for this visit.     \"My blood sugar is more acceptable now on the amaryl\" Previously in 140-150s now in " "110-120s range.    Additional Meds/Supplements: coQ10, stopped MVI due to stomach upset, avoids omega 3 supplements due to seafood allergy    Special Learning Needs/barriers to learning/any new barriers N/a   Height: Ht Readings from Last 5 Encounters:   24 5' 8\" (1.727 m)   24 5' 8\" (1.727 m)   24 5' 8\" (1.727 m)   24 5' 8\" (1.727 m)   09/10/24 5' 8\" (1.727 m)      Weight: Wt Readings from Last 10 Encounters:   24 98 kg (216 lb)   24 98.3 kg (216 lb 12.8 oz)   24 97.6 kg (215 lb 3.2 oz)   24 98.7 kg (217 lb 9.6 oz)   24 97.5 kg (215 lb)   09/10/24 96.8 kg (213 lb 6.4 oz)   24 98.7 kg (217 lb 9.5 oz)   24 96.2 kg (212 lb)   24 131 kg (289 lb 0.4 oz)   24 91.6 kg (202 lb)     Estimated body mass index is 32.84 kg/m² as calculated from the following:    Height as of 24: 5' 8\" (1.727 m).    Weight as of 24: 98 kg (216 lb).   Recent Weight Change: [x]Yes     []No  Amount: Noticed weight gain with increase in amount of medications, ~15lb.       Energy Needs: [place]- St. Jeor Equation: 1536 - 1849 kcal (mifflin x 1.3 - 1.5 activity - 500 for 1 lb weight loss per week)  173 - 208 g CHO per day (45% kcal from CHO)   Allergies   Allergen Reactions    Shellfish-Derived Products - Food Allergy Anaphylaxis    Azithromycin Rash    or intolerances Allergy to shellfish/fish causing anaphylaxis    Does not like liver or beets, can't tolerate spicy foods   Social History     Substance and Sexual Activity   Alcohol Use Not Currently    Alcohol/week: 1.0 standard drink of alcohol    Types: 1 Shots of liquor per week    Comment: rarely    Very rare, last drink was at [employer]    Social History     Tobacco Use   Smoking Status Former    Current packs/day: 0.00    Average packs/day: 1 pack/day for 24.2 years (24.2 ttl pk-yrs)    Types: Cigarettes    Start date: 2000    Quit date: 3/22/2024    Years since quittin.5   Smokeless Tobacco " "Never   Tobacco Comments    Smoked 0.5-1 ppd; quit in 03/2024.     Quit smoking 6 mo ago cold turkey   Who shops? patient and daughter sometimes   Who cooks/cooking methods/Eating out/take out habits   patient  Cooking methods: bake/connor/air connor/grill/boil---mostly air fried or baked or broiled or shallow connor eggs in spray oil     Take out: chick benson grilled chicken wrap if she does go out    Dining out tries to portion control if she goes out such as on vacations, limited    Exercise: Aquatherapy \"for my back\"     Finished cardiac rehab, joined [employer] to continue with her physical activity regimen. Plans to go there 3x per week.          Other: ie: Sleep habits/ stress level/ work habits household-lives with ?/ food security Did not assess at this visit   Prior Nutritional Counseling? [x]Yes --during hospitalization for heart attack, low sodium, low fat, low cholesterol    []No  When:      Why:         Diet Hx:  Breakfast: Diet: 64 oz fluid restriction, trying to reduce carbs and sodium  Wakes up at 2 AM (works at 4 AM)    Eats cereal \"fills the bowl\" or biscuits with scrambled eggs with peppers/onions/cheese baked in muffin cups as a premade egg sandwich or yogurt with fruit such as regular Greek yogurt or Tl English muffin cinnamon raisin with margarine     Coffee with zero sugar creamer     Lunch: At 11 AM  Grilled cheese with tomato or ham/cheese sandwich or ham/cheese wrap and fresh fruit tries to get low sodium lunchmeat with rosey or swiss cheese    Uses 647 bread and carb smart tortillas      Dinner: Meat/veg/whole wheat pasta or brown rice or baked sweet potato or sweet potato fries in air fryer    Or may have cottage cheese with fruit or salad      Snacks: AM - at 6/6:30 such as granola bar with fruit or yogurt and SF jello and water \"take it with more pills\"  PM - would like to try slim fast shakes for this  HS - fun size snicker bar or 90 kcal popcorners or low fat cheetos, goes to bed at 7 " "AM     BodyArmor Light, 1 per day using this has her potassium supplement     \"I splurged\" and had a diet rootbeer, avoids regular soda    Other Notes/ Initial Assessment:  Pt reports previous low calorie diet in effort to lose weight though was advised by physician to see dietitian for dietary guidance. Pt is knowledgeable of reading foods labels to identify sodium, fat, cholesterol content, unsure of appropriate carb intake for meals.      Updated assessment (Follow up note only):           Nutrition Diagnosis:   Altered Nutrition-Related Laboratory values  related to Kidney, liver, cardiac, endocrine, neurologic, and/or pulmonary dysfunction as evidenced by  Abnormal plasma glucose and/or HgbA1c levels + abnormal TRIG/LDLs       Any change or new dx since previous visit:       Medical Nutrition Therapy Intervention:  [x]Individualized Meal Plan--Discussed carb counting to help promote glycemic control and possibly aid in weight loss. Discussed aiming for 45-60 g carb per meal x 3 meals per day + 15 grams carb at 2 snacks per day. Reviewed example carbohydrate foods and portions such as various fruits which she eats frequently.  []Understanding Lab Values   []Basic Pathophysiology of Disease []Food/Medication Interactions   [x]Food Diary--Consider calorie tracking at a later time so as to not overwhelm patient, focus on carb counting at this time.  []Exercise   []Lifestyle/Behavior Modification Techniques []Medication, Mechanism of Action   [x]Label Reading: Reviewed how to read labels to identify carbohydrate content, discussed dietary fiber and subtracting this for net carb intake.  []Self Blood Glucose Monitoring   [x]Weight/BMI Goals:Goal of 0.5-1lb weight loss per week.  []Other -     Handouts provided: Portions Booklet, homemade banana nice cream recipe          Comprehension: []Excellent  []Very Good  [x]Good  []Fair   []Poor    Receptivity: []Excellent  []Very Good  [x]Good  []Fair   []Poor    Expected " "Compliance: []Excellent  []Very Good  [x]Good  []Fair   []Poor        Goals (initial)/ Progress made on previous goals/new goals:  Pt to lose 0.5-1lb per week upon follow up.    2. Pt to obtain A1c < 7% upon next lab reading.   3. Pt to limit snacks to 15 grams carb each upon follow up.        No follow-ups on file.  Labs:  CMP  Lab Results   Component Value Date    K 4.8 09/10/2024     09/10/2024    CO2 29 09/10/2024    BUN 18 09/10/2024    CREATININE 0.98 09/10/2024    GLUCOSE 168 (H) 04/07/2024    GLUF 133 (H) 09/10/2024    CALCIUM 10.1 09/10/2024    CORRECTEDCA 9.0 04/07/2024    AST 21 09/09/2024    ALT 26 09/09/2024    ALKPHOS 47 09/09/2024    EGFR 60 09/10/2024       BMP  Lab Results   Component Value Date    GLUCOSE 168 (H) 04/07/2024    CALCIUM 10.1 09/10/2024    K 4.8 09/10/2024    CO2 29 09/10/2024     09/10/2024    BUN 18 09/10/2024    CREATININE 0.98 09/10/2024       Lipids  No results found for: \"CHOL\"  Lab Results   Component Value Date    HDL 57 09/27/2024    HDL 45 (L) 04/22/2024    HDL 50 03/22/2024     Lab Results   Component Value Date    LDLCALC 103 (H) 09/27/2024    LDLCALC 70 04/22/2024    LDLCALC 236 (H) 03/22/2024     Lab Results   Component Value Date    TRIG 168 (H) 09/27/2024    TRIG 137 04/22/2024    TRIG 185 (H) 03/22/2024     No results found for: \"CHOLHDL\"    Hemoglobin A1C  Lab Results   Component Value Date    HGBA1C 7.2 (H) 09/27/2024       Fasting Glucose  Lab Results   Component Value Date    GLUF 133 (H) 09/10/2024       Insulin     Thyroid  No results found for: \"TSH\", \"L1XAIZL\", \"V7LMVRG\", \"THYROIDAB\"    Hepatic Function Panel  Lab Results   Component Value Date    ALT 26 09/09/2024    AST 21 09/09/2024    ALKPHOS 47 09/09/2024       Celiac Disease Antibody Panel  No results found for: \"ENDOMYSIAL IGA\", \"GLIADIN IGA\", \"GLIADIN IGG\", \"IGA\", \"TISSUE TRANSGLUT AB\", \"TTG IGA\"   Iron  Lab Results   Component Value Date    IRON 68 04/07/2024    TIBC 307 04/07/2024    " FERRITIN 59 04/07/2024            Audrey Peters RD, LDN  [facility] CLINICAL NUTRITION SERVICES  [address] [place] [address] [address]  [facility] [id number]

## 2024-10-01 ENCOUNTER — NURSE TRIAGE (OUTPATIENT)
Age: 64
End: 2024-10-01

## 2024-10-01 ENCOUNTER — CLINICAL SUPPORT (OUTPATIENT)
Dept: NUTRITION | Facility: CLINIC | Age: 64
End: 2024-10-01
Payer: COMMERCIAL

## 2024-10-01 VITALS — BODY MASS INDEX: 32.63 KG/M2 | WEIGHT: 214.6 LBS

## 2024-10-01 DIAGNOSIS — I25.5 ISCHEMIC CARDIOMYOPATHY: Primary | ICD-10-CM

## 2024-10-01 DIAGNOSIS — I50.20 HFREF (HEART FAILURE WITH REDUCED EJECTION FRACTION) (HCC): ICD-10-CM

## 2024-10-01 DIAGNOSIS — G47.33 OBSTRUCTIVE SLEEP APNEA (ADULT) (PEDIATRIC): Primary | ICD-10-CM

## 2024-10-01 DIAGNOSIS — I48.92 ATRIAL FLUTTER, PAROXYSMAL (HCC): Chronic | ICD-10-CM

## 2024-10-01 PROCEDURE — 97802 MEDICAL NUTRITION INDIV IN: CPT | Performed by: DIETITIAN, REGISTERED

## 2024-10-01 NOTE — TELEPHONE ENCOUNTER
"Pt called stating last evening she noticed left arm, left leg are swollen. Left arm edema extends from shoulder to hand, fingers slightly puffy. Left leg from hip down to ankle  Denies any pain  Nerve damage left leg, back injury rods placed.   Positive circulation. Warm to touch  Pt stated PCP office is closed today and does not want to report to ED, or urgent care. Pt was at urgent care yesterday for pink eye.     Answer Assessment - Initial Assessment Questions  1. ONSET: \"When did the swelling start?\" (e.g., minutes, hours, days)      Last evening  2. LOCATION: \"What part of the leg is swollen?\"  \"Are both legs swollen or just one leg?\"      Knee to ankle  3. SEVERITY: \"How bad is the swelling?\" (e.g., localized; mild, moderate, severe)   - Localized - small area of swelling localized to one leg   - MILD pedal edema - swelling limited to foot and ankle, pitting edema < 1/4 inch (6 mm) deep, rest and elevation eliminate most or all swelling   - MODERATE edema - swelling of lower leg to knee, pitting edema > 1/4 inch (6 mm) deep, rest and elevation only partially reduce swelling   - SEVERE edema - swelling extends above knee, facial or hand swelling present       moderate  4. REDNESS: \"Does the swelling look red or infected?\"      denies  5. PAIN: \"Is the swelling painful to touch?\" If Yes, ask: \"How painful is it?\"   (Scale 1-10; mild, moderate or severe)      denies  6. FEVER: \"Do you have a fever?\" If Yes, ask: \"What is it, how was it measured, and when did it start?\"       denies  7. CAUSE: \"What do you think is causing the leg swelling?\"      unknown  8. MEDICAL HISTORY: \"Do you have a history of heart failure, kidney disease, liver failure, or cancer?\"      CHF  9. RECURRENT SYMPTOM: \"Have you had leg swelling before?\" If Yes, ask: \"When was the last time?\" \"What happened that time?\"      denies  10. OTHER SYMPTOMS: \"Do you have any other symptoms?\" (e.g., chest pain, difficulty breathing)        " denies    Protocols used: Leg Swelling and Edema-ADULT-[place]

## 2024-10-02 ENCOUNTER — OFFICE VISIT (OUTPATIENT)
Dept: FAMILY MEDICINE CLINIC | Facility: CLINIC | Age: 64
End: 2024-10-02
Payer: COMMERCIAL

## 2024-10-02 VITALS
HEIGHT: 68 IN | DIASTOLIC BLOOD PRESSURE: 60 MMHG | SYSTOLIC BLOOD PRESSURE: 100 MMHG | BODY MASS INDEX: 33.16 KG/M2 | WEIGHT: 218.8 LBS

## 2024-10-02 DIAGNOSIS — R60.0 LOCALIZED EDEMA: Primary | ICD-10-CM

## 2024-10-02 DIAGNOSIS — E11.9 TYPE 2 DIABETES MELLITUS WITHOUT COMPLICATION, WITHOUT LONG-TERM CURRENT USE OF INSULIN (HCC): ICD-10-CM

## 2024-10-02 DIAGNOSIS — I25.10 CORONARY ARTERY DISEASE INVOLVING NATIVE CORONARY ARTERY OF NATIVE HEART WITHOUT ANGINA PECTORIS: Chronic | ICD-10-CM

## 2024-10-02 DIAGNOSIS — F41.1 GENERALIZED ANXIETY DISORDER: ICD-10-CM

## 2024-10-02 PROBLEM — K35.31 ACUTE APPENDICITIS WITH LOCALIZED PERITONITIS AND GANGRENE, WITHOUT PERFORATION OR ABSCESS: Status: RESOLVED | Noted: 2024-05-08 | Resolved: 2024-10-02

## 2024-10-02 PROCEDURE — 99214 OFFICE O/P EST MOD 30 MIN: CPT | Performed by: FAMILY MEDICINE

## 2024-10-02 NOTE — ASSESSMENT & PLAN NOTE
I do feel this is evident today although is not taking medication at this point for this but will reassess at next visit

## 2024-10-02 NOTE — TELEPHONE ENCOUNTER
Spoke with patient, saw her PCP today who increased her Lasix 20mg to 40mg every other day starting today; also has a 3lb weight gain since yesterday, with left arm/leg swelling. Has a follow up appointment with PCP next week.    Advised her to contact office after her PCP visit next week.    Please advise if any further recommendations.    Vesna [phone]

## 2024-10-02 NOTE — ASSESSMENT & PLAN NOTE
Discussed problem.  At this point I feel should stick with current cardiologist who she does like and continue overall follow-up.  Push activity as tolerated

## 2024-10-02 NOTE — TELEPHONE ENCOUNTER
"Spoke with patient, returning Shelby's call.  Unable to determine reason but did relay Dr Bell's message from a previous encounter regarding PCP recommendations.    \"This is very reasonable. No other changes now\"      Please return Vesna's call [phone] if any further information needs to be relayed.  "

## 2024-10-02 NOTE — PATIENT INSTRUCTIONS
Discussed the problem with the increased weight and does have some increased swelling on the left leg which is not evident today at this time.  Does not appear to be in congestive heart failure.  Continue to watch the fluid intake and today should take 1 extra dose in the early afternoon of the Lasix and do this every other day.  May elevate the legs when sitting.  I do feel may do her basic exercise and we will reevaluate in 1 week

## 2024-10-02 NOTE — ASSESSMENT & PLAN NOTE
Is up about 3-1/2 pounds.  Does limit fluids.  Should try taking a second dose of the Lasix in the early afternoon every other day and will reassess here in 1 week.  Try to elevate legs when sitting

## 2024-10-02 NOTE — PROGRESS NOTES
Ambulatory Visit  Name: Vesna Langston      : 1960      MRN: [id number]  Encounter Provider: Brandon Pete MD  Encounter Date: 10/2/2024   Encounter department: [facility] PRIMARY CARE    Assessment & Plan  Localized edema  Is up about 3-1/2 pounds.  Does limit fluids.  Should try taking a second dose of the Lasix in the early afternoon every other day and will reassess here in 1 week.  Try to elevate legs when sitting         Coronary artery disease involving native coronary artery of native heart without angina pectoris  Discussed problem.  At this point I feel should stick with current cardiologist who she does like and continue overall follow-up.  Push activity as tolerated         Type 2 diabetes mellitus without complication, without long-term current use of insulin ([facility])  Is watching diet.  Will just continue with current meds at this time and I feel should do well  Lab Results   Component Value Date    HGBA1C 7.2 (H) 2024            Generalized anxiety disorder  I do feel this is evident today although is not taking medication at this point for this but will reassess at next visit              History of Present Illness         Patient had 3 recent hospitalizations the first for MI then for problems with syncope and was found to be in atrial flutter.  Did have pacemaker placed with defibrillator.  Last hospitalization was for GI blood loss and was found to have severe ulcerations in the stomach.  Is on protonic at this time as well as Carafate and no longer having pain.  The Carafate has now been stopped.  Was started on levothyroxine for problems with hypothyroidism while in the hospital.  Is following up with cardiology and interventional cardiology in [place].  Is now doing cardiac rehab.  Has question if should get second opinion from another cardiologist at suggestion of relatives although does like her current cardiologist.  Had been more concerned about her  blood sugar and Amaryl was added 1 mg and sugars are now down in the low 100 range in fasting state.  He is concerned because had gained 3 pounds and has noticed some intermittent swelling in her legs more on the left although does not appreciably noticed difference with her breathing      Review of Systems   Constitutional:  Negative for chills and fever.   HENT:  Negative for congestion and sore throat.    Eyes:  Negative for visual disturbance.   Respiratory:  Positive for shortness of breath (But about at baseline). Negative for cough and chest tightness.    Cardiovascular:  Negative for chest pain and palpitations.   Gastrointestinal:  Negative for abdominal pain and vomiting.   Endocrine: Negative for polyuria.   Musculoskeletal:  Negative for arthralgias and back pain.   Skin:  Negative for color change and rash.   Neurological:  Negative for dizziness, seizures, syncope and light-headedness.   Psychiatric/Behavioral:  Negative for sleep disturbance.    All other systems reviewed and are negative.    Past Medical History:   Diagnosis Date    Acute respiratory insufficiency 03/22/2024    Allergic     Chronic HFrEF (heart failure with reduced ejection fraction) ([facility])     Coronary artery disease     COVID-19 virus infection 11/28/2022    Diabetes mellitus (HCC)     Disease of thyroid gland 4/09/2024    Hyperlipidemia     Hypoglycemia     ICD (implantable cardioverter-defibrillator) in place     Ischemic cardiomyopathy     Lactic acidosis 03/22/2024    Myocardial infarction ([facility]) 3/22/2024    Otitis media 1966    ST elevation myocardial infarction involving left anterior descending (LAD) coronary artery ([facility]) 03/22/2024    Tobacco abuse     Visual impairment 1967     Past Surgical History:   Procedure Laterality Date    ADENOIDECTOMY      APPENDECTOMY      APPENDECTOMY LAPAROSCOPIC N/A 05/08/2024    Procedure: APPENDECTOMY LAPAROSCOPIC;  Surgeon: Lauryn Ullrich, DO;  Location: [facility];  Service: General     BREAST EXCISIONAL BIOPSY Right 2000    cyst removed- benign    CARDIAC CATHETERIZATION N/A 2024    Procedure: Cardiac pci;  Surgeon: Gabriel Myers MD;  [facility];  Service: Cardiology    CARDIAC CATHETERIZATION N/A 2024    Procedure: Cardiac Coronary Angiogram;  Surgeon: Gabriel Myers MD;  [facility];  Service: Cardiology    CARDIAC CATHETERIZATION N/A 2024    Procedure: Cardiac PCI AMI;  Surgeon: Gabriel Myers MD;  [facility];  Service: Cardiology    CARDIAC CATHETERIZATION N/A 2024    Procedure: Cardiac IVUS;  Surgeon: Gabriel Meyrs MD;  [facility];  Service: Cardiology    CARDIAC ELECTROPHYSIOLOGY PROCEDURE N/A 2024    Procedure: Cardiac icd implant;  Surgeon: Jeffy Lama MD;  [facility];  Service: Cardiology     SECTION      ECTOPIC PREGNANCY SURGERY      SEPTOPLASTY      SPINE SURGERY  23    TONSILLECTOMY       Family History   Adopted: Yes   Problem Relation Age of Onset    Depression Mother     Heart disease Father     OCD Daughter      Social History     Tobacco Use    Smoking status: Former     Current packs/day: 0.00     Average packs/day: 1 pack/day for 24.2 years (24.2 total pack years)     Types: Cigarettes     Start date: 2000     Quit date: 3/22/2024     Years since quittin.5    Smokeless tobacco: Never    Tobacco comments:     Smoked 0.5-1 ppd; quit in 2024.    Vaping Use    Vaping status: Never Used   Substance and Sexual Activity    Alcohol use: Not Currently     Alcohol/week: 1.0 standard drink of alcohol     Types: 1 Shots of liquor per week     Comment: rarely    Drug use: Never    Sexual activity: Not Currently     Partners: Female     Birth control/protection: Post-menopausal     Current Outpatient Medications on File Prior to Visit   Medication Sig    amiodarone 200 mg tablet Take 1 tablet (200 mg total) by mouth daily    apixaban (ELIQUIS) 5 mg  Take 1 tablet (5 mg total) by mouth 2 (two) times a day    atorvastatin (LIPITOR) 80 mg tablet TAKE 1 TABLET BY MOUTH EVERY EVENING    clopidogrel (PLAVIX) 75 mg tablet Take 1 tablet (75 mg total) by mouth daily    Durolane 60 MG/3ML injection     Empagliflozin (Jardiance) 10 MG TABS tablet Take 1 tablet (10 mg total) by mouth every morning    furosemide (LASIX) 20 mg tablet Take 1 tablet (20 mg total) by mouth daily    glimepiride (AMARYL) 1 mg tablet Take 1 tablet (1 mg total) by mouth daily with breakfast    isosorbide mononitrate (IMDUR) 30 mg 24 hr tablet Take 1 tablet (30 mg total) by mouth daily    levothyroxine (Synthroid) 75 mcg tablet Take 1 tablet (75 mcg total) by mouth daily    meclizine (ANTIVERT) 25 mg tablet Take 1 tablet (25 mg total) by mouth every 8 (eight) hours as needed for dizziness    metoprolol succinate (TOPROL-XL) 25 mg 24 hr tablet Take 2 tablets (50 mg total) by mouth daily at bedtime    pantoprazole (PROTONIX) 40 mg tablet TAKE 1 TABLET BY MOUTH 2 TIMES A DAY BEFORE MEALS.    polymyxin b-trimethoprim (POLYTRIM) ophthalmic solution Administer 1 drop to the right eye every 4 (four) hours for 7 days    sacubitril-valsartan (Entresto) 49-51 MG TABS Take 1 tablet by mouth 2 (two) times a day    spironolactone (ALDACTONE) 25 mg tablet Take 1 tablet (25 mg total) by mouth daily    albuterol (ProAir HFA) 90 mcg/act inhaler Inhale 2 puffs every 6 (six) hours as needed for wheezing (Patient not taking: Reported on 9/17/2024)    diphenhydrAMINE (BENADRYL) 25 mg capsule Take 25 mg by mouth every 6 (six) hours as needed for itching (Patient not taking: Reported on 9/30/2024)    gabapentin (Neurontin) 100 mg capsule Take 1 capsule (100 mg total) by mouth 2 (two) times a day (Patient not taking: Reported on 9/30/2024)    HYDROcodone-acetaminophen (NORCO) 5-325 mg per tablet Take 1 tablet by mouth every 6 (six) hours as needed for pain for up to 10 doses Max Daily Amount: 4 tablets (Patient not taking:  "Reported on 9/20/2024)    [DISCONTINUED] Multiple Vitamin (MULTIVITAMIN) capsule Take 1 capsule by mouth daily (Patient not taking: Reported on 8/27/2024)     Allergies   Allergen Reactions    Fish-Derived Products - Food Allergy Anaphylaxis     Cannot have all seafood products    Shellfish-Derived Products - Food Allergy Anaphylaxis    Azithromycin Rash     Immunization History   Administered Date(s) Administered    COVID-19 MODERNA VACC 0.5 ML IM 04/19/2021, 05/17/2021    COVID-19 Moderna mRNA Vaccine 12 Yr+ 50 mcg/0.5 mL (Spikevax) 09/04/2024    Hep A / Hep B 09/05/2024    INFLUENZA 10/10/2019, 09/23/2021, 09/22/2022, 12/13/2023    Influenza Split High Dose Preservative Free IM 09/04/2024    Pneumococcal Conjugate Vaccine 20-valent (Pcv20), Polysace 04/04/2023    Respiratory Syncytial Virus Vaccine (Recombinant, Adjuvanted) 09/05/2024    Tdap 09/18/2020    Typhoid, Unspecified 10/10/2019    Zoster Vaccine Recombinant 09/18/2020, 01/04/2021     Objective     Blood Pressure 100/60 (BP Location: Left arm, Patient Position: Sitting, Cuff Size: Large)   Height 5' 8\" (1.727 m)   Weight 99.2 kg (218 lb 12.8 oz)   Body Mass Index 33.27 kg/m²     Physical Exam  Vitals and nursing note reviewed.   Constitutional:       General: She is not in acute distress.     Appearance: Normal appearance. She is well-developed.   HENT:      Head: Normocephalic and atraumatic.      Nose: Nose normal.      Mouth/Throat:      Mouth: Mucous membranes are moist.   Eyes:      Conjunctiva/sclera: Conjunctivae normal.   Neck:      Vascular: No carotid bruit.   Cardiovascular:      Rate and Rhythm: Normal rate and regular rhythm.      Heart sounds: Murmur (Soft systolic murmur at left sternal border.  Heart rate is 72) heard.   Pulmonary:      Effort: Pulmonary effort is normal. No respiratory distress.      Breath sounds: Normal breath sounds.   Abdominal:      Palpations: Abdomen is soft.      Tenderness: There is no abdominal tenderness. "   Musculoskeletal:         General: No swelling.      Cervical back: Neck supple. No tenderness.      Right lower leg: Edema (Trace edema of ankle) present.      Left lower leg: Edema (Trace edema of ankle) present.   Lymphadenopathy:      Cervical: No cervical adenopathy.   Skin:     General: Skin is warm and dry.      Findings: No rash.   Neurological:      Mental Status: She is alert.      Motor: No weakness.      Coordination: Coordination normal.      Gait: Gait normal.   Psychiatric:         Mood and Affect: Mood normal.

## 2024-10-02 NOTE — ASSESSMENT & PLAN NOTE
Is watching diet.  Will just continue with current meds at this time and I feel should do well  Lab Results   Component Value Date    HGBA1C 7.2 (H) 09/27/2024

## 2024-10-03 ENCOUNTER — PROCEDURE VISIT (OUTPATIENT)
Age: 64
End: 2024-10-03
Payer: COMMERCIAL

## 2024-10-03 ENCOUNTER — PATIENT MESSAGE (OUTPATIENT)
Dept: FAMILY MEDICINE CLINIC | Facility: CLINIC | Age: 64
End: 2024-10-03

## 2024-10-03 VITALS
BODY MASS INDEX: 32.74 KG/M2 | HEIGHT: 68 IN | DIASTOLIC BLOOD PRESSURE: 54 MMHG | HEART RATE: 71 BPM | WEIGHT: 216 LBS | SYSTOLIC BLOOD PRESSURE: 94 MMHG

## 2024-10-03 DIAGNOSIS — M25.561 CHRONIC PAIN OF RIGHT KNEE: Primary | ICD-10-CM

## 2024-10-03 DIAGNOSIS — M17.11 PRIMARY OSTEOARTHRITIS OF RIGHT KNEE: ICD-10-CM

## 2024-10-03 DIAGNOSIS — G89.29 CHRONIC PAIN OF RIGHT KNEE: Primary | ICD-10-CM

## 2024-10-03 DIAGNOSIS — Z87.828 HISTORY OF TORN MENISCUS OF RIGHT KNEE: ICD-10-CM

## 2024-10-03 PROCEDURE — 20610 DRAIN/INJ JOINT/BURSA W/O US: CPT | Performed by: STUDENT IN AN ORGANIZED HEALTH CARE EDUCATION/TRAINING PROGRAM

## 2024-10-03 RX ORDER — BUPIVACAINE HYDROCHLORIDE 2.5 MG/ML
2 INJECTION, SOLUTION INFILTRATION; PERINEURAL
Status: COMPLETED | OUTPATIENT
Start: 2024-10-03 | End: 2024-10-03

## 2024-10-03 RX ADMIN — BUPIVACAINE HYDROCHLORIDE 2 ML: 2.5 INJECTION, SOLUTION INFILTRATION; PERINEURAL at 09:00

## 2024-10-03 NOTE — PROGRESS NOTES
1. Chronic pain of right knee  Large joint arthrocentesis: R knee      2. Primary osteoarthritis of right knee  Large joint arthrocentesis: R knee      3. History of torn meniscus of right knee  Large joint arthrocentesis: R knee          Large joint arthrocentesis: R knee  [place] Protocol:  procedure performed by consultantConsent: Verbal consent obtained.  Risks and benefits: risks, benefits and alternatives were discussed  Consent given by: patient  Patient understanding: patient states understanding of the procedure being performed  Site marked: the operative site was marked  Radiology Images displayed and confirmed. If images not available, report reviewed: imaging studies available  Patient identity confirmed: verbally with patient  Supporting Documentation  Indications: pain   Procedure Details  Location: knee - R knee  Preparation: Patient was prepped and draped in the usual sterile fashion  Needle size: 22 G  Ultrasound guidance: no  Approach: anterolateral  Medications administered: 2 mL bupivacaine 0.25 %; 3 mL sodium hyaluronate 60 MG/3ML    Patient tolerance: patient tolerated the procedure well with no immediate complications  Dressing:  Sterile dressing applied          Follow-up in 4 weeks for reevaluation postinjection

## 2024-10-03 NOTE — PATIENT INSTRUCTIONS
Patient Education     Viscosupplementation   Why is this procedure done?   Your joints are where two bones meet. The ends of the bones are covered with a protective coating of cartilage. This helps them glide smoothly during movement. A fluid also helps the joint move more smoothly. With years of use, the cartilage may begin to wear away. This causes pain in the joints. This procedure is done to relieve the pain. A special fluid is used to help the bones glide more easily. It also acts like a shock absorber. This is most often done on the knee joints.  What will the results be?   Lubricates the joint  Less pain in the joints during movement or weight bearing  Improved joint mobility or movement  What happens before the procedure?   Your doctor may ask you to try other ways to treat osteoarthritis or OA, such as exercise, physical therapy, drugs for pain, applying hot compress, and losing weight.  Talk to your doctor about all the drugs you are taking. Be sure to include all prescription and over-the-counter (OTC) drugs, and herbal supplements. Tell the doctor about any drug allergy. Bring a list of drugs you take with you. Some drugs may interact with the injection.  What happens during the procedure?   Your doctor will clean the skin area where the injection will be given. You will be given a drug called local anesthesia. This will numb your skin and you will not feel any pain.  In some cases, your doctor might use imaging like x-ray or ultrasound to make sure they inject the right spot.  If you have excess fluid in your joint, your doctor may remove the fluid before beginning.  A needle will be used to inject the hyaluronic acid into the joint. Hyaluronic acid is a natural fluid in your body. It lubricates the joint to ease body movements.  The doctor will cover the injection site with a small bandage to prevent infection.  The procedure may only take a couple of minutes.  What happens after the procedure?   You  may feel slight pain, warmth, and swelling around the joint area.  You will be able to go home after the injection.  What care is needed at home?   Ask your doctor what you need to do when you go home. Make sure you ask questions if you do not understand what the doctor says. This way you will know what you need to do.  Place an ice pack or a bag of frozen peas wrapped in a towel over the painful part. Never put ice right on the skin. Do not leave the ice on more than 10 to 15 minutes at a time.  Rest for the first few days after the procedure. Avoid strenuous activities like heavy lifting, jogging, standing for a long time, and hard exercise.  What follow-up care is needed?   Your doctor may ask you to make visits to the office to check on your progress. Be sure to keep these visits. Your doctor may want you to have more injections.  What lifestyle changes are needed?   Ask your doctor about when you can go back to your normal activities like exercise or work.  What problems could happen?   Pain, redness, and swelling around the injection site  Infection of the joint  Fever  Allergic reaction  Itching  Rash  Bruising  Bleeding in the joint  No change in pain after injection  Last Reviewed Date   2021-03-30  Consumer Information Use and Disclaimer   This generalized information is a limited summary of diagnosis, treatment, and/or medication information. It is not meant to be comprehensive and should be used as a tool to help the user understand and/or assess potential diagnostic and treatment options. It does NOT include all information about conditions, treatments, medications, side effects, or risks that may apply to a specific patient. It is not intended to be medical advice or a substitute for the medical advice, diagnosis, or treatment of a health care provider based on the health care provider's examination and assessment of a patient’s specific and unique circumstances. Patients must speak with a health care  provider for complete information about their health, medical questions, and treatment options, including any risks or benefits regarding use of medications. This information does not endorse any treatments or medications as safe, effective, or approved for treating a specific patient. [employer] and its affiliates disclaim any warranty or liability relating to this information or the use thereof. The use of this information is governed by the Terms of Use, available at https://www.w[employer]ters[employer]vo Communicationsuwer.com/en/know/clinical-effectiveness-terms   Copyright   Copyright © 2024 [employer] and its affiliates and/or licensors. All rights reserved.

## 2024-10-06 ENCOUNTER — HOSPITAL ENCOUNTER (OUTPATIENT)
Dept: ULTRASOUND IMAGING | Facility: HOSPITAL | Age: 64
Discharge: HOME | End: 2024-10-06
Payer: COMMERCIAL

## 2024-10-06 DIAGNOSIS — M67.442 GANGLION CYST OF FINGER OF LEFT HAND: ICD-10-CM

## 2024-10-06 PROCEDURE — 76882 US LMTD JT/FCL EVL NVASC XTR: CPT

## 2024-10-07 DIAGNOSIS — E11.9 TYPE 2 DIABETES MELLITUS WITHOUT COMPLICATION, WITHOUT LONG-TERM CURRENT USE OF INSULIN (HCC): ICD-10-CM

## 2024-10-07 RX ORDER — GLIMEPIRIDE 1 MG/1
1 TABLET ORAL
Qty: 90 TABLET | Refills: 3 | Status: SHIPPED | OUTPATIENT
Start: 2024-10-07 | End: 2025-10-02

## 2024-10-08 ENCOUNTER — HOSPITAL ENCOUNTER (OUTPATIENT)
Dept: NON INVASIVE DIAGNOSTICS | Facility: CLINIC | Age: 64
Discharge: HOME | End: 2024-10-08
Payer: COMMERCIAL

## 2024-10-08 ENCOUNTER — OFFICE VISIT (OUTPATIENT)
Dept: OBGYN CLINIC | Facility: CLINIC | Age: 64
End: 2024-10-08
Payer: COMMERCIAL

## 2024-10-08 VITALS
DIASTOLIC BLOOD PRESSURE: 60 MMHG | WEIGHT: 216 LBS | BODY MASS INDEX: 32.74 KG/M2 | HEIGHT: 68 IN | TEMPERATURE: 97.6 F | SYSTOLIC BLOOD PRESSURE: 110 MMHG | OXYGEN SATURATION: 96 % | HEART RATE: 70 BPM

## 2024-10-08 VITALS
BODY MASS INDEX: 32.74 KG/M2 | SYSTOLIC BLOOD PRESSURE: 94 MMHG | WEIGHT: 216.05 LBS | DIASTOLIC BLOOD PRESSURE: 54 MMHG | HEART RATE: 71 BPM | HEIGHT: 68 IN

## 2024-10-08 DIAGNOSIS — Z87.828 HISTORY OF TORN MENISCUS OF RIGHT KNEE: ICD-10-CM

## 2024-10-08 DIAGNOSIS — M25.561 CHRONIC PAIN OF RIGHT KNEE: Primary | ICD-10-CM

## 2024-10-08 DIAGNOSIS — G89.29 CHRONIC PAIN OF RIGHT KNEE: Primary | ICD-10-CM

## 2024-10-08 DIAGNOSIS — M17.11 PRIMARY OSTEOARTHRITIS OF RIGHT KNEE: ICD-10-CM

## 2024-10-08 DIAGNOSIS — I50.20 HFREF (HEART FAILURE WITH REDUCED EJECTION FRACTION) (HCC): ICD-10-CM

## 2024-10-08 LAB
AORTIC ROOT: 3.3 CM
APICAL FOUR CHAMBER EJECTION FRACTION: 63 %
APOB+LDLR+PCSK9 GENE MUT ANL BLD/T: NOT DETECTED
ASCENDING AORTA: 2.8 CM
BRCA1+BRCA2 DEL+DUP + FULL MUT ANL BLD/T: NOT DETECTED
BSA FOR ECHO PROCEDURE: 2.11 M2
DOP CALC LVOT AREA: 3.14 CM2
DOP CALC LVOT DIAMETER: 2 CM
E WAVE DECELERATION TIME: 236 MS
E/A RATIO: 0.54
FRACTIONAL SHORTENING: 37 (ref 28–44)
INTERVENTRICULAR SEPTUM IN DIASTOLE (PARASTERNAL SHORT AXIS VIEW): 1.2 CM
INTERVENTRICULAR SEPTUM: 1.2 CM (ref 0.6–1.1)
LAAS-AP2: 19.6 CM2
LAAS-AP4: 21.5 CM2
LEFT ATRIUM SIZE: 3.8 CM
LEFT ATRIUM VOLUME (MOD BIPLANE): 63 ML
LEFT ATRIUM VOLUME INDEX (MOD BIPLANE): 29.9 ML/M2
LEFT INTERNAL DIMENSION IN SYSTOLE: 2.6 CM (ref 2.1–4)
LEFT VENTRICULAR INTERNAL DIMENSION IN DIASTOLE: 4.1 CM (ref 3.5–6)
LEFT VENTRICULAR POSTERIOR WALL IN END DIASTOLE: 1.2 CM
LEFT VENTRICULAR STROKE VOLUME: 50 ML
LVSV (TEICH): 50 ML
MLH1+MSH2+MSH6+PMS2 GN DEL+DUP+FUL M: NOT DETECTED
MV E'TISSUE VEL-LAT: 6 CM/S
MV E'TISSUE VEL-SEP: 4 CM/S
MV PEAK A VEL: 0.9 M/S
MV PEAK E VEL: 49 CM/S
MV STENOSIS PRESSURE HALF TIME: 68 MS
MV VALVE AREA P 1/2 METHOD: 3.24
RA PRESSURE ESTIMATED: 8 MMHG
RIGHT ATRIUM AREA SYSTOLE A4C: 13.1 CM2
RIGHT VENTRICLE ID DIMENSION: 3 CM
RV PSP: 26 MMHG
SL CV LEFT ATRIUM LENGTH A2C: 6.1 CM
SL CV LV EF: 40
SL CV PED ECHO LEFT VENTRICLE DIASTOLIC VOLUME (MOD BIPLANE) 2D: 75 ML
SL CV PED ECHO LEFT VENTRICLE SYSTOLIC VOLUME (MOD BIPLANE) 2D: 25 ML
TR MAX PG: 18 MMHG
TR PEAK VELOCITY: 2.1 M/S
TRICUSPID ANNULAR PLANE SYSTOLIC EXCURSION: 2.7 CM
TRICUSPID VALVE PEAK REGURGITATION VELOCITY: 2.13 M/S

## 2024-10-08 PROCEDURE — 99213 OFFICE O/P EST LOW 20 MIN: CPT | Performed by: STUDENT IN AN ORGANIZED HEALTH CARE EDUCATION/TRAINING PROGRAM

## 2024-10-08 PROCEDURE — 93306 TTE W/DOPPLER COMPLETE: CPT | Performed by: INTERNAL MEDICINE

## 2024-10-08 PROCEDURE — 93306 TTE W/DOPPLER COMPLETE: CPT

## 2024-10-08 RX ADMIN — PERFLUTREN 0.8 ML/MIN: 6.52 INJECTION, SUSPENSION INTRAVENOUS at 10:11

## 2024-10-09 ENCOUNTER — PATIENT MESSAGE (OUTPATIENT)
Dept: SLEEP CENTER | Facility: CLINIC | Age: 64
End: 2024-10-09

## 2024-10-09 ENCOUNTER — TELEPHONE (OUTPATIENT)
Dept: SLEEP CENTER | Facility: CLINIC | Age: 64
End: 2024-10-09

## 2024-10-09 ENCOUNTER — TELEPHONE (OUTPATIENT)
Dept: CARDIOLOGY CLINIC | Facility: CLINIC | Age: 64
End: 2024-10-09

## 2024-10-09 NOTE — RESULT ENCOUNTER NOTE
Please notify pt her echo has improved. Significantly better heart pumping function than prior. Good news.      Thanks    - Vitor

## 2024-10-09 NOTE — TELEPHONE ENCOUNTER
Called patient and gave results.       ----- Message from Vitor Bell MD sent at 10/9/2024  2:27 PM EDT -----  Please notify pt her echo has improved. Significantly better heart pumping function than prior. Good news.      Thanks    - Vitor

## 2024-10-09 NOTE — TELEPHONE ENCOUNTER
Patient is calling insurance to see which DME provider her insurance pars with in her area. Patient was advised to call back so that information can be sent to supplier to get machine. Patient was schedule for a compliance appt on January 2, 2025 at 11am in [place] with Dr. Caceres.

## 2024-10-09 NOTE — TELEPHONE ENCOUNTER
The order was placed on October 1st. Side note, it is less likely that a majority of the events are secondary to the broken nose, particularly due to how well she responded to CPAP; I would still recommend she pursue treatment.

## 2024-10-10 NOTE — PROGRESS NOTES
1. Chronic pain of right knee  Brace      2. Primary osteoarthritis of right knee  Brace      3. History of torn meniscus of right knee  Brace          Orders Placed This Encounter   Procedures    Brace        Imaging Studies (I personally reviewed images in PACS and report):      X-ray right knee 7/26/2024: Mild tricompartmental osteophytic changes evidence by marginal osteophytes.  Superior patellar enthesophyte from quadriceps tendon.  Small joint effusion.                IMPRESSION:  Acute on chronic chronic right knee pain  Of note h/o fall on right knee in 2017-she has an MRI report as noted above indicating osteoarthritic changes as well as medial/lateral meniscal tears  Pain have an aggravating initially after starting cardiac rehab due to chronic systolic heart failure.  Patient noting that she is trying to become more active and go on 5K walks.  However her right knee pain is a limiting factor as pain worsens with prolonged walking  Radiographic imaging notes degenerative changes  Differential includes osteoarthritic pain versus pain from her degenerative meniscal tears versus pes anserine syndrome  Patient reports that she was told in the past that she can no longer have cortisone injections due to the potential side effect of straining her heart given her history of chronic systolic heart failure  Since recent visit of Visco injection of her right knee, patient has been weightbearing much more on her right lower extremity at work and her outside activities.  She states that has been causing more aggravation and stability of her knee and requires use of cane.  I suspect pain is secondary to the degenerative changes of her knee and the fact that viscosupplementation injection takes 3 to 4 weeks to fully take effect.    Other factors:  BMI 32  Congestive heart failure - reportedly under restrictions of no strenuous activities and seeing cardiac rehab currently  History of defibrillator implant  Type 2  "diabetes    PLAN:    Clinical exam and radiographic imaging reviewed with patient today, with impression as per above. I have discussed with the patient the pathophysiology of this diagnosis and reviewed how the examination correlates with this diagnosis.    Counseled patient that while she is allowed to weight-bear as tolerated on her right lower extremity that I do feel she may have excessively been weightbearing with her outside activities and work.  I discussed again that viscosupplementation junctions can take up to 4 weeks to take effect.  Given her sense of instability and pain of her right knee while walking I did prescribe her a hinged knee brace which she felt did provide relief and support while walking.  Counseled the goals eventually transition out of the brace as her symptoms improve.  I will hold onto my appointment with her that is approximately 4 weeks from the injection to determine its effectiveness.    No follow-ups on file.    Portions of the record may have been created with voice recognition software. Occasional wrong word or \"sound a like\" substitutions may have occurred due to the inherent limitations of voice recognition software. Read the chart carefully and recognize, using context, where substitutions have occurred.     CHIEF COMPLAINT:  Right knee pain      HPI:  Vesna Langston is a 64 y.o. female  who presents for     Visit 10/8/2024:  Follow-up evaluation of right knee pain  Patient recently seen on 10/8/2020 for which she was given a viscosupplementation injection of her right knee.  Patient admits that she may have been \"excessively\" using her right lower extremity for weightbearing after this injection.  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  She does not do any squatting, climbing ladders or working at elevations.  She also has been trying to be more active and went on a 10 mile walk.  She states the ground was flat and did not aggravate her knee much.  " However the following day she [place] with her grandchildren.  She states the ground was much more unsteady and aggravating to her knee and towards the end of the day she felt a sense of instability of her knee, stiffness and sharp/aching pains over the medial/lateral aspects.  She states her instability was bad enough that she had to use a cane.  She does not have a brace but is here today asking whether we can supplement her brace.    Visit 9/17/2024:  Follow-up of ration of right knee pain  Of note history as per below.  Patient has an old MRI noting arthritic changes as well as medial/lateral meniscus tears in the past.  We had talked about a cortisone injection of her knee but at the time she was told her cardiologist to restrict her from prednisone/cortisone due to her cardiac disease history.  There was consideration for viscosupplementation injection of her knee but it was deferred at the time.  However, today patient states she is interested in pursuing the Visco injection for her right knee.  She states she is try to be more active and attend 5K walks but feels that with prolonged walking she has worsening medial/lateral aching/sharp pains, swelling, stiffness and has to rest frequently.  She does feel that use of the compression knee sleeve has helped mitigate some of the pain with activities.  She denies any new injuries of her right knee since last visit.  She reports continued crepitus of her knee.  She is asking whether I could reach out to her cardiologist to determine the risk of a cortisone injection for the future but would like to pursue a Visco injection first.    Separately, patient also wanted to address a left index finger pain.  She states she has noticed a localized cystic swelling along the side of her DIP joint.  She states that it has become more aggravating to do her craft activities at home due to the pain and restricted motion of her DIP joint.  She denies any  discoloration or N/T.  She denies any injury of her index finger.  She had seen her PCP for this issue and was told it was a ganglion cyst and is here today to discuss whether there are treatments for this issue going forward there.  She states she has had prior fractures of her index finger in the past and thinks it may be due to the arthritis in her finger.    Visit 7/26/2024 :  Initial evaluation of right knee pain  Of note, patient reports a prior work injury of her right knee in the past from a fall.  She did bring in an imaging study from 2017 of her MRI as noted above.  Images are not available to review today.  Reports she did not end up undergoing surgical interventions for her right knee and was treated conservatively with injections, bracing, physical therapy for period of time.  She states overall these interventions provided some relief but never felt that her pain was fully resolved.  Furthermore, she is currently undergoing cardiac rehab given her history of systolic congestive heart failure, h/o STEMI.  She states she is under a restriction of no strenuous activities.  She states she previously underwent cortisone injections but due to her cardiac condition, she was told that she can no longer receive cortisone/prednisone.  In regards to her right knee pain, she reports has been worsening over the past several weeks.  No precipitating injury.  She states the pain is mainly over the medial aspect of her knee.  Despite being on a limited activity restriction from cardiology, she feels it can be aggravated after performing stationary biking.  Describes the pain as a burning sensation of moderate to severe intensity depending on how active she is.  She denies any noticeable swelling.  Denies any discoloration.  Reports stiffness with knee range of motion's when aggravated.  Reports crepitus of her knee which is chronic.  In regards to treatment she reports use of Tylenol, icing and resting all of which  provide some relief.  She is unsure if her prior injury/meniscal tears in the past are contributing factor as she does not want to undergo surgical intervention if possible.      Medical, Surgical, Family, and Social History    Past Medical History:   Diagnosis Date    Acute respiratory insufficiency 03/22/2024    Allergic     Chronic HFrEF (heart failure with reduced ejection fraction) ([facility])     Coronary artery disease     COVID-19 virus infection 11/28/2022    Diabetes mellitus (HCC)     Disease of thyroid gland 4/09/2024    Heart disease 3/24    Hyperlipidemia     Hypoglycemia     ICD (implantable cardioverter-defibrillator) in place     Ischemic cardiomyopathy     Lactic acidosis 03/22/2024    Myocardial infarction (HCC) 3/22/2024    Otitis media 1966    ST elevation myocardial infarction involving left anterior descending (LAD) coronary artery ([facility]) 03/22/2024    Tobacco abuse     Visual impairment 1967     Past Surgical History:   Procedure Laterality Date    ADENOIDECTOMY      APPENDECTOMY      APPENDECTOMY LAPAROSCOPIC N/A 05/08/2024    Procedure: APPENDECTOMY LAPAROSCOPIC;  Surgeon: Lauryn Ullrich, DO;  Location: [facility];  Service: General    BACK SURGERY  8/23    BREAST EXCISIONAL BIOPSY Right 09/2000    cyst removed- benign    CARDIAC CATHETERIZATION N/A 03/22/2024    Procedure: Cardiac pci;  Surgeon: Gabriel Myers MD;  Location: [facility];  Service: Cardiology    CARDIAC CATHETERIZATION N/A 03/22/2024    Procedure: Cardiac Coronary Angiogram;  Surgeon: Gabriel Myers MD;  [facility];  Service: Cardiology    CARDIAC CATHETERIZATION N/A 03/22/2024    Procedure: Cardiac PCI AMI;  Surgeon: Gabriel Myers MD;  [facility];  Service: Cardiology    CARDIAC CATHETERIZATION N/A 03/22/2024    Procedure: Cardiac IVUS;  Surgeon: Gabriel Myers MD;  [facility];  Service: Cardiology    CARDIAC ELECTROPHYSIOLOGY PROCEDURE N/A 03/27/2024    Procedure: Cardiac  "icd implant;  Surgeon: Jeffy Lama MD;  Location:  [facility];  Service: Cardiology     SECTION      ECTOPIC PREGNANCY SURGERY      SEPTOPLASTY      SPINE SURGERY  23    TONSILLECTOMY       Social History   Social History     Substance and Sexual Activity   Alcohol Use Not Currently    Alcohol/week: 1.0 standard drink of alcohol    Types: 1 Shots of liquor per week    Comment: Every 2or 3months     Social History     Substance and Sexual Activity   Drug Use Never     Social History     Tobacco Use   Smoking Status Former    Current packs/day: 0.00    Average packs/day: 1 pack/day for 24.2 years (24.2 ttl pk-yrs)    Types: Cigarettes    Start date: 2000    Quit date: 3/22/2024    Years since quittin.5    Passive exposure: Past   Smokeless Tobacco Never   Tobacco Comments    Smoked 0.5-1 ppd; quit in 2024.      Family History   Adopted: Yes   Problem Relation Age of Onset    Depression Mother     Heart disease Father     OCD Daughter      Allergies   Allergen Reactions    Fish-Derived Products - Food Allergy Anaphylaxis     Cannot have all seafood products    Shellfish-Derived Products - Food Allergy Anaphylaxis    Azithromycin Rash          Physical Exam  /60   Pulse 70   Temp 97.6 °F (36.4 °C) (Temporal)   Ht 5' 8\" (1.727 m)   Wt 98 kg (216 lb)   SpO2 96%   BMI 32.84 kg/m²     Constitutional:  see vital signs  Gen: well-developed, normocephalic/atraumatic, well-groomed  Eyes: No inflammation or discharge of conjunctiva or lids; sclera clear   Pulmonary/Chest: Effort normal. No respiratory distress.     Ortho Exam  Right Knee Exam:  Erythema: no  Swelling: no  Increased Warmth: no  Tenderness: +MJL, +LJL  ROM: 0-130  Knee flexion strength: 5/5  Knee extension strength: 5/5  Patellar Grind: +  Varus laxity: negative  Valgus laxity: negative    No calf tenderness to palpation     Gait: Antalgic.  Using a single-point cane for mobility.  Once hinged knee brace was applied to " her right knee, she does report a sense of increased stability/pain relief while weightbearing.      Procedures

## 2024-10-10 NOTE — PROGRESS NOTES
"Advanced Heart Failure/Pulmonary Hypertension Outpatient Note - Vesna Langston 64 y.o. female MRN: [id number]    @ Encounter: [id number]    Assessment:  64 y.o. female PMH and acute problems listed later in this note (a partial list may also be included within 'assessment' section) p/w HF fu.  I first met Vesna Langston on 5/6/24.    Primarily ICM, HFimpEF, LVEF 30-35%>PCI+gdmt>40-45% 10/2024 TTE, nondilated LV  LHC 03/22/2024: s/p PCI to 100% stenosis of ostial/proximal LAD. No other residual dz elsewhere.  cMRI 03/26/2024: LVEF 20%. LVIDd 4.5 cm. \"Transmural scarring of the mid anterior, anteroseptal and inferoseptal walls, the apical anterior, septal and inferior walls and the apex.\"   Coronary artery disease  S/p MI in 03/2024; received PCI to 100% stenosis of ostial/proximal LAD.  History of monomorphic ventricular tachycardia              Per EP notes, felt to be scar mediated.               S/p secondary prevention ICD.               Continues on amiodarone per EP.   Atrial flutter, paroxysmal               Anticoagulation on Eliquis.               Rhythm control: amiodarone per EP.  PE 5/2024. CTA: Single subsegmental right lower lobe pulmonary embolus as shown on abdominal CT.   Hyperlipidemia  Diabetes mellitus, type II  Chronic back pain  History of tobacco abuse  4/7/24 CT: IMPRESSION:  Suspected small hemorrhage from the anterior wall of the distal gastric body with focal wall thickening. Bleeding due to underlying lesion or PUD is suspected. Recommend endoscopic correlation.  Past heavy NSAID use, 2024 stopped  Lung and spleen granulomas.  Works in [employer], no heavy lifting she says      I have reviewed all pertinent patient data including but not limited to:        Lab Units 09/10/24  0706 09/09/24  1731 08/25/24  0442   CREATININE mg/dL 0.98 1.06 0.92           Lab Results   Component Value Date    K 4.8 09/10/2024     Lab Results   Component Value Date    HGBA1C 7.2 (H) 09/27/2024     Lab Results " "  Component Value Date    VNM2BNUJUHCE 5.566 (H) 09/27/2024     Lab Results   Component Value Date    LDLCALC 103 (H) 09/27/2024     Lab Results   Component Value Date    BNP 82 09/09/2024      No results found for: \"NTBNP\"       TODAY'S PLAN:     10/14/24  Warm, euvolemic  No new cardiac complaints, feels generally well  Remains active, at work  Now seeing nutritionist, no longer doing extreme caloric restriction to 800 cals per day, as she was in recent past  Hydrating better    We reviewed her reassuring echo finings: EF better  No MR    She has been taking lasix 40 then 20 on alternating days - move to 20 qd going forward  Long discussion about evidence of worsening HF, when to self uptitrate home diuretic and call cardiology office    Gdmt below  Historically Limited by hypotension though has had some room  Has ICD for VT    On amio per EP; Has fu 12/2024  Defer to EP to consider potential Rx change in case amio contributing to prior GI complaints/alternate long term AAD strategy than amio    On AC+plavix  On high intens statin    Deranged Thyroids and DM per PCP    She mentions multiple possible upcoming surgeries in 2025 for which she will need pre op risk stratification at appropriate time    Follow up:  With me in 4 months after echo, or sooner if symptoms evolve  In addition to follow up with their other medical providers    Key info from my prior notes:    She has been consuming only 800 calories a day for 1 month or longer in an extreme attempt to lose weight > advised against this. Nutrition referral given today.    Trial imdur for any component of angina  Then 1 week later up entresto and fu BMP afterwards; this will also help with volume management  Cw lasix 20 qd standing for now, which is recent increase from prior    Warm, euvolemic  Has increased lasix 20 prn to qdaily standing for past 1 week for increased weight, mild sob, mild edema of feet - no major improvement.  Today is last minute urgent " visit for ED visit 8/25 for CP in setting of home  transiently, ACS ruled out. Then 8/26 yesterday had episode of vomiting, faintness, no LOC, no chest pain, has felt better since then. No ICD shocks, no palpitations. Did not improve with meclizine. Normal-higher blood sugars at home recently/during events.  Very rare opioid use she says for her back pain issues, none since 1-2 weeks ago    Follows GI    Discussed therapeutic lifestyle changes, low-moderate intensity exercise, maintain acceptable hydration 64 oz water daily, salt restrict, Mediterranean vs DASH diet, weight loss  Avoid nsaids    Further review of return to work next week  Safest plan would be no driving x 3 mo - resume 6/2024 if remains stable    Pharmacotherapies / Neurohormonal Blockade:  --Beta Blocker: metoprolol succinate 50 qd  --ARNi / ACEi / ARB: entresto 49/51 bid  --Aldosterone Antagonist: aldactone 25 qd  --SGLT2 Inhibitor: jardiance 25 qd for HF and DM > subsequently Sglt2i stopped in early 2024 2/2 yeast infx>9/10/24 Re-attempt lower sglt2i dose now, jardiance 10 qd, long discussion risks vs benefits and this is pt wish    --Diuretic: 20 qd standing  Long discussion about evidence of worsening HF, when to self uptitrate home diuretic and call cardiology office     Sudden Cardiac Death Risk Reduction:  --Medtronic dual chamber ICD in situ since 03/2024 (secondary prevention).   8/1/24  MDT DUAL ICD (MVP ON) / ACTIVE SYSTEM IS MRI CONDITIONAL   [facility]. BATTERY VOLTAGE ADEQUATE(12.4 YRS). AP 21%  <0.1% ALL LEAD PARAMETERS WITHIN NORMAL LIMITS. NO NEW SIGNIFICANT HIGH RATE EPISODES. OPTI-VOL WITHIN NORMAL LIMITS. NO PROGRAMMING CHANGES MADE TO DEVICE PARAMETERS. NORMAL DEVICE FUNCTION.   Electrical Resynchronization:  --Candidacy for BiV device: narrow QRS.      Advanced Therapies: Will continue to monitor.    Studies:  I have reviewed all pertinent patient data/labs/imaging where available,  including but not limited to the below studies. Selected results may be displayed here but comprehensive listing is omitted for note clarity and can be found in the epic chart.    ECG.    Echo.    Stress.    Cath.    HPI:   64 y.o. female PMH and acute problems listed later in this note (a partial list may also be included within 'assessment' section) p/w HF fu.  I first met Vesna Langston on 5/6/24.  No new CP/SOB/dizziness/palpitations/syncope.  No new fatigue.  No new unintentional weight changes.  No new leg swelling, PND, pillow orthopnea.  No new fevers, chills, cough, nausea, vomiting, diarrhea, dysuria.      Interval History:  As noted in 'plan' section above and prior epic chart notes.    No new CP/SOB/dizziness/palpitations/syncope.  No new fatigue.  No new unintentional weight changes.  No new leg swelling, PND, pillow orthopnea.  No new fevers, chills, cough, nausea, vomiting, diarrhea, dysuria.    Past Medical History:   Diagnosis Date    Acute respiratory insufficiency 03/22/2024    Allergic     Chronic HFrEF (heart failure with reduced ejection fraction) (HCC)     Coronary artery disease     COVID-19 virus infection 11/28/2022    Diabetes mellitus (HCC)     Disease of thyroid gland 4/09/2024    Heart disease 3/24    Hyperlipidemia     Hypoglycemia     ICD (implantable cardioverter-defibrillator) in place     Ischemic cardiomyopathy     Lactic acidosis 03/22/2024    Myocardial infarction ([facility]) 3/22/2024    Otitis media 1966    ST elevation myocardial infarction involving left anterior descending (LAD) coronary artery ([facility]) 03/22/2024    Tobacco abuse     Visual impairment 1967     Patient Active Problem List    Diagnosis Date Noted    Primary osteoarthritis of right knee 10/08/2024    History of torn meniscus of right knee 10/08/2024    Chronic pain of right knee 10/08/2024    Localized edema 10/02/2024    Obstructive sleep apnea (adult) (pediatric) 10/01/2024    POP (obstructive sleep apnea) 09/23/2024     Obesity, morbid ([facility]) 08/27/2024    Ganglion cyst of finger of left hand 08/15/2024    Acute pulmonary embolism without acute cor pulmonale ([facility]) 05/07/2024    History of gastric ulcer 05/07/2024    Generalized anxiety disorder 05/02/2024    Anemia due to acute blood loss 04/19/2024    Acquired hypothyroidism 04/19/2024    Constipation 04/10/2024    Coffee ground emesis 04/07/2024    Type 2 diabetes mellitus, without long-term current use of insulin ([facility]) 04/07/2024    Syncope 04/01/2024    Guaiac positive stools 04/01/2024    S/P ICD (internal cardiac defibrillator) procedure 03/27/2024    Chronic low back pain 03/25/2024    HFrEF (heart failure with reduced ejection fraction) ([facility]) 03/25/2024    Ischemic cardiomyopathy 03/24/2024    Coronary artery disease involving native coronary artery of native heart 03/23/2024    S/P coronary artery stent placement 03/23/2024    Atrial flutter, paroxysmal ([facility]) 03/22/2024    Hyperlipemia 03/22/2024    New onset type 2 diabetes mellitus ([facility]) 03/22/2024    Tobacco abuse 03/22/2024    History of sustained ventricular tachycardia 03/22/2024    Back pain 07/31/2022    Sciatica of left side 07/31/2022       ROS:  10 point ROS negative except as specified in HPI/interval history    Allergies   Allergen Reactions    Fish-Derived Products - Food Allergy Anaphylaxis     Cannot have all seafood products    Shellfish-Derived Products - Food Allergy Anaphylaxis    Azithromycin Rash     Current Outpatient Medications   Medication Instructions    albuterol (ProAir HFA) 90 mcg/act inhaler 2 puffs, Inhalation, Every 6 hours PRN    amiodarone 200 mg, Oral, Daily    apixaban (ELIQUIS) 5 mg, Oral, 2 times daily    atorvastatin (LIPITOR) 80 mg, Oral, Every evening    clopidogrel (PLAVIX) 75 mg, Oral, Daily    diphenhydrAMINE (BENADRYL) 25 mg, Oral, Every 6 hours PRN    Durolane 60 MG/3ML injection     Empagliflozin (JARDIANCE) 10 mg, Oral, Every morning    furosemide (LASIX) 20 mg, Oral, Daily,  Alternating every other day with 1 tablet daily    gabapentin (NEURONTIN) 100 mg, Oral, 2 times daily    glimepiride (AMARYL) 1 mg, Oral, Daily with breakfast    HYDROcodone-acetaminophen (NORCO) 5-325 mg per tablet 1 tablet, Oral, Every 6 hours PRN    isosorbide mononitrate (IMDUR) 30 mg, Oral, Daily    levothyroxine (SYNTHROID) 75 mcg, Oral, Daily    meclizine (ANTIVERT) 25 mg, Oral, Every 8 hours PRN    metoprolol succinate (TOPROL-XL) 50 mg, Oral, Daily at bedtime    pantoprazole (PROTONIX) 40 mg, Oral, 2 times daily before meals    sacubitril-valsartan (Entresto) 49-51 MG TABS 1 tablet, Oral, 2 times daily    spironolactone (ALDACTONE) 25 mg, Oral, Daily      Social History     Socioeconomic History    Marital status:      Spouse name: Not on file    Number of children: 3    Years of education: Not on file    Highest education level: Not on file   Occupational History    Not on file   Tobacco Use    Smoking status: Former     Current packs/day: 0.00     Average packs/day: 1 pack/day for 24.2 years (24.2 ttl pk-yrs)     Types: Cigarettes     Start date: 2000     Quit date: 3/22/2024     Years since quittin.5     Passive exposure: Past    Smokeless tobacco: Never    Tobacco comments:     Smoked 0.5-1 ppd; quit in 2024.    Vaping Use    Vaping status: Never Used   Substance and Sexual Activity    Alcohol use: Not Currently     Alcohol/week: 1.0 standard drink of alcohol     Types: 1 Shots of liquor per week     Comment: Every 2or 3months    Drug use: Never    Sexual activity: Not Currently     Partners: Male     Birth control/protection: Post-menopausal   Other Topics Concern    Not on file   Social History Narrative    Not on file     Social Determinants of Health     Financial Resource Strain: Low Risk  (2023)    Received from [facility], [facility]    Overall Financial Resource Strain (CARDIA)     Difficulty of Paying Living Expenses: Not hard at all  "  Food Insecurity: No Food Insecurity (9/13/2024)    Hunger Vital Sign     Worried About Running Out of Food in the Last Year: Never true     Ran Out of Food in the Last Year: Never true   Transportation Needs: No Transportation Needs (9/13/2024)    PRAPARE - Transportation     Lack of Transportation (Medical): No     Lack of Transportation (Non-Medical): No   Physical Activity: Not on file   Stress: No Stress Concern Present (2/20/2023)    Received from [facility] [employer] [facility], [facility]    [facility] [employer] - Occupational Stress Questionnaire     Feeling of Stress : Not at all   Social Connections: Unknown (6/18/2024)    [facility] from [facility]     How often do you feel lonely or isolated from those around you? (Adult - for ages 18 years and over): Not on file   Intimate Partner Violence: Not At Risk (2/20/2023)    [facility], [facility]    Humiliation, Afraid, Rape, and Kick questionnaire     Fear of Current or Ex-Partner: No     Emotionally Abused: No     Physically Abused: No     Sexually Abused: No   Housing Stability: High Risk (9/13/2024)    Housing Stability Vital Sign     Unable to Pay for Housing in the Last Year: No     Number of Times Moved in the Last Year: 2     Homeless in the Last Year: No     Family History   Adopted: Yes   Problem Relation Age of Onset    Depression Mother     Heart disease Father     OCD Daughter        Physical Exam:  Vitals:    10/14/24 0816   BP: 116/68   BP Location: Left arm   Patient Position: Sitting   Cuff Size: Large   Pulse: 71   SpO2: 99%   Weight: 100 kg (221 lb)   Height: 5' 8\" (1.727 m)         Constitutional: NAD, non toxic  Ears/nose/mouth/throat: atraumatic  CV: RRR, nl S1S2, no murmurs/rubs/gallups, no JVD, no HJR  Resp: CTABL  GI: Soft, NTND  MSK: no swollen joints in exposed areas  Extr: No edema, warm LE  Pysche: Normal affect  Neuro: " appropriate in conversation  Skin: dry and intact in exposed areas    Labs & Results:  Lab Results   Component Value Date    SODIUM 138 09/10/2024    K 4.8 09/10/2024     09/10/2024    CO2 29 09/10/2024    BUN 18 09/10/2024    CREATININE 0.98 09/10/2024    GLUC 148 (H) 09/09/2024    CALCIUM 10.1 09/10/2024     Lab Results   Component Value Date    WBC 8.55 09/09/2024    HGB 12.0 09/09/2024    HCT 38.2 09/09/2024    MCV 82 09/09/2024     09/09/2024       Counseling / Coordination of Care  Greater than 50% of total time was spent with the patient and / or family counseling and / or coordination of care.  We discussed diagnoses, most recent studies, tests and any changes in treatment plan.    Thank you for the opportunity to participate in the care of this patient.    Vitor Bell MD  Attending Physician  Advanced Heart Failure and Transplant Cardiology  [facility]

## 2024-10-11 ENCOUNTER — OFFICE VISIT (OUTPATIENT)
Dept: FAMILY MEDICINE CLINIC | Facility: CLINIC | Age: 64
End: 2024-10-11
Payer: COMMERCIAL

## 2024-10-11 VITALS — BODY MASS INDEX: 32.74 KG/M2 | WEIGHT: 216 LBS | HEIGHT: 68 IN

## 2024-10-11 DIAGNOSIS — I25.10 CORONARY ARTERY DISEASE INVOLVING NATIVE CORONARY ARTERY OF NATIVE HEART WITHOUT ANGINA PECTORIS: Chronic | ICD-10-CM

## 2024-10-11 DIAGNOSIS — F41.1 GENERALIZED ANXIETY DISORDER: ICD-10-CM

## 2024-10-11 DIAGNOSIS — R60.0 LOCALIZED EDEMA: Primary | ICD-10-CM

## 2024-10-11 DIAGNOSIS — I50.22 CHRONIC HFREF (HEART FAILURE WITH REDUCED EJECTION FRACTION) (HCC): ICD-10-CM

## 2024-10-11 DIAGNOSIS — E11.9 TYPE 2 DIABETES MELLITUS WITHOUT COMPLICATION, WITHOUT LONG-TERM CURRENT USE OF INSULIN (HCC): ICD-10-CM

## 2024-10-11 PROCEDURE — 99214 OFFICE O/P EST MOD 30 MIN: CPT | Performed by: FAMILY MEDICINE

## 2024-10-11 RX ORDER — FUROSEMIDE 20 MG/1
20 TABLET ORAL 2 TIMES DAILY
Qty: 45 TABLET | Refills: 5 | Status: SHIPPED | OUTPATIENT
Start: 2024-10-11 | End: 2024-10-14

## 2024-10-11 NOTE — PATIENT INSTRUCTIONS
Overall seems much better today.  The weight is down about 2-1/2 pounds and we will just continue with the slightly increased dose of the Lasix.  Anxiety level looks much better.  The echo report showed an increase in cardiac output to 45% and was much better.  Continue current meds and follow-up

## 2024-10-11 NOTE — PROGRESS NOTES
Ambulatory Visit  Name: Vesna Langston      : 1960      MRN: [id number]  Encounter Provider: Brandon Pete MD  Encounter Date: 10/11/2024   Encounter department: [facility] PRIMARY CARE    Assessment & Plan  Chronic HFrEF (heart failure with reduced ejection fraction) ([facility])  Wt Readings from Last 3 Encounters:   10/14/24 100 kg (221 lb)   10/11/24 98 kg (216 lb)   10/08/24 98 kg (216 lb 0.8 oz)     Cardiac output has markedly improved and patient does seem much more comfortable.  Continue overall follow-up          Orders:    furosemide (LASIX) 20 mg tablet; Take 1 tablet (20 mg total) by mouth 2 (two) times a day Alternating every other day with 1 tablet daily    Localized edema  Weight is down about 2-1/2 pounds I feel overall is doing well.  Will continue with the current regimen         Generalized anxiety disorder  Seems to be doing much better and is now promoting outside activity         Type 2 diabetes mellitus without complication, without long-term current use of insulin ([facility])  Doing better.  Continue to watch diet and continue current medication  Lab Results   Component Value Date    HGBA1C 7.2 (H) 2024            Coronary artery disease involving native coronary artery of native heart without angina pectoris  Cardiac output has markedly improved.  Continue to push activity as tolerated and continue the cardiac rehab and follow-up              History of Present Illness         Patient had 3 recent hospitalizations the first for MI then for problems with syncope and was found to be in atrial flutter.  Did have pacemaker placed with defibrillator.  Last hospitalization was for GI blood loss and was found to have severe ulcerations in the stomach.  Is on protonic at this time as well as Carafate and no longer having pain.  The Carafate has now been stopped.  Was started on levothyroxine for problems with hypothyroidism while in the hospital.  Is following up with  cardiology and interventional cardiology in [place].  Is now doing cardiac rehab.  Was having a lot of problems with stress but is feeling much better at this time.  Had been having trouble retaining fluid with some swelling in the legs.  Did increase the Lasix to twice a day every other day and this is a little better.  Just had echocardiogram done which showed cardiac output had increased to 45%      Review of Systems   Constitutional:  Negative for fatigue.   HENT:  Negative for congestion.    Respiratory:  Positive for shortness of breath (This has improved). Negative for cough and chest tightness.    Cardiovascular:  Positive for leg swelling (This has improved). Negative for chest pain and palpitations.   Gastrointestinal:  Negative for abdominal pain, constipation and diarrhea.   Endocrine: Negative for polyuria.   Neurological:  Negative for dizziness and light-headedness.   Psychiatric/Behavioral:  Negative for sleep disturbance. The patient is not nervous/anxious.      Past Medical History:   Diagnosis Date    Acute respiratory insufficiency 03/22/2024    Allergic     Chronic HFrEF (heart failure with reduced ejection fraction) ([facility])     Coronary artery disease     COVID-19 virus infection 11/28/2022    Diabetes mellitus (HCC)     Disease of thyroid gland 4/09/2024    Heart disease 3/24    Hyperlipidemia     Hypoglycemia     ICD (implantable cardioverter-defibrillator) in place     Ischemic cardiomyopathy     Lactic acidosis 03/22/2024    Myocardial infarction ([facility]) 3/22/2024    Otitis media 1966    ST elevation myocardial infarction involving left anterior descending (LAD) coronary artery ([facility]) 03/22/2024    Tobacco abuse     Visual impairment 1967     Past Surgical History:   Procedure Laterality Date    ADENOIDECTOMY      APPENDECTOMY      APPENDECTOMY LAPAROSCOPIC N/A 05/08/2024    Procedure: APPENDECTOMY LAPAROSCOPIC;  Surgeon: Lauryn Ullrich, DO;  [facility]: [facility];  Service: General    BACK  SURGERY      BREAST EXCISIONAL BIOPSY Right 2000    cyst removed- benign    CARDIAC CATHETERIZATION N/A 2024    Procedure: Cardiac pci;  Surgeon: Gabriel Myers MD;  [facility];  Service: Cardiology    CARDIAC CATHETERIZATION N/A 2024    Procedure: Cardiac Coronary Angiogram;  Surgeon: Gabriel Myers MD;  [facility];  Service: Cardiology    CARDIAC CATHETERIZATION N/A 2024    Procedure: Cardiac PCI AMI;  Surgeon: Gabriel Myers MD;  [facility];  Service: Cardiology    CARDIAC CATHETERIZATION N/A 2024    Procedure: Cardiac IVUS;  Surgeon: Gabriel Myers MD;  [facility];  Service: Cardiology    CARDIAC ELECTROPHYSIOLOGY PROCEDURE N/A 2024    Procedure: Cardiac icd implant;  Surgeon: Jeffy Lama MD;  [facility];  Service: Cardiology     SECTION      ECTOPIC PREGNANCY SURGERY      SEPTOPLASTY      SPINE SURGERY  23    TONSILLECTOMY       Family History   Adopted: Yes   Problem Relation Age of Onset    Depression Mother     Heart disease Father     OCD Daughter      Social History     Tobacco Use    Smoking status: Former     Current packs/day: 0.00     Average packs/day: 1 pack/day for 24.2 years (24.2 total pack years)     Types: Cigarettes     Start date: 2000     Quit date: 3/22/2024     Years since quittin.5     Passive exposure: Past    Smokeless tobacco: Never    Tobacco comments:     Smoked 0.5-1 ppd; quit in 2024.    Vaping Use    Vaping status: Never Used   Substance and Sexual Activity    Alcohol use: Not Currently     Alcohol/week: 1.0 standard drink of alcohol     Types: 1 Shots of liquor per week     Comment: Every 2or 3months    Drug use: Never    Sexual activity: Not Currently     Partners: Male     Birth control/protection: Post-menopausal     Current Outpatient Medications on File Prior to Visit   Medication Sig    albuterol (ProAir HFA) 90 mcg/act inhaler  Inhale 2 puffs every 6 (six) hours as needed for wheezing    amiodarone 200 mg tablet Take 1 tablet (200 mg total) by mouth daily    atorvastatin (LIPITOR) 80 mg tablet TAKE 1 TABLET BY MOUTH EVERY EVENING    clopidogrel (PLAVIX) 75 mg tablet Take 1 tablet (75 mg total) by mouth daily    diphenhydrAMINE (BENADRYL) 25 mg capsule Take 25 mg by mouth every 6 (six) hours as needed for itching    Durolane 60 MG/3ML injection     Empagliflozin (Jardiance) 10 MG TABS tablet Take 1 tablet (10 mg total) by mouth every morning    gabapentin (Neurontin) 100 mg capsule Take 1 capsule (100 mg total) by mouth 2 (two) times a day    glimepiride (AMARYL) 1 mg tablet Take 1 tablet (1 mg total) by mouth daily with breakfast    HYDROcodone-acetaminophen (NORCO) 5-325 mg per tablet Take 1 tablet by mouth every 6 (six) hours as needed for pain for up to 10 doses Max Daily Amount: 4 tablets    isosorbide mononitrate (IMDUR) 30 mg 24 hr tablet Take 1 tablet (30 mg total) by mouth daily    levothyroxine (Synthroid) 75 mcg tablet Take 1 tablet (75 mcg total) by mouth daily    meclizine (ANTIVERT) 25 mg tablet Take 1 tablet (25 mg total) by mouth every 8 (eight) hours as needed for dizziness    metoprolol succinate (TOPROL-XL) 25 mg 24 hr tablet Take 2 tablets (50 mg total) by mouth daily at bedtime    pantoprazole (PROTONIX) 40 mg tablet TAKE 1 TABLET BY MOUTH 2 TIMES A DAY BEFORE MEALS.    sacubitril-valsartan (Entresto) 49-51 MG TABS Take 1 tablet by mouth 2 (two) times a day    spironolactone (ALDACTONE) 25 mg tablet Take 1 tablet (25 mg total) by mouth daily    apixaban (ELIQUIS) 5 mg Take 1 tablet (5 mg total) by mouth 2 (two) times a day     Allergies   Allergen Reactions    Fish-Derived Products - Food Allergy Anaphylaxis     Cannot have all seafood products    Shellfish-Derived Products - Food Allergy Anaphylaxis    Azithromycin Rash     Immunization History   Administered Date(s) Administered    COVID-19 MODERNA VACC 0.5 ML IM  "04/19/2021, 05/17/2021    COVID-19 Moderna mRNA Vaccine 12 Yr+ 50 mcg/0.5 mL (Spikevax) 09/04/2024    Hep A / Hep B 09/05/2024, 10/03/2024    INFLUENZA 10/10/2019, 09/23/2021, 09/22/2022, 12/13/2023    Influenza Split High Dose Preservative Free IM 09/04/2024    Pneumococcal Conjugate Vaccine 20-valent (Pcv20), Polysace 04/04/2023    Respiratory Syncytial Virus Vaccine (Recombinant, Adjuvanted) 09/05/2024    Tdap 09/18/2020    Typhoid, Unspecified 10/10/2019    Zoster Vaccine Recombinant 09/18/2020, 01/04/2021     Objective     Height 5' 8\" (1.727 m)   Weight 98 kg (216 lb)   Body Mass Index 32.84 kg/m²     Physical Exam  Vitals and nursing note reviewed.   Constitutional:       General: She is not in acute distress.     Appearance: Normal appearance. She is well-developed.   HENT:      Head: Normocephalic and atraumatic.      Nose: Nose normal.   Eyes:      Conjunctiva/sclera: Conjunctivae normal.   Neck:      Vascular: No carotid bruit.   Cardiovascular:      Rate and Rhythm: Normal rate and regular rhythm.      Heart sounds: Murmur (Soft systolic murmur at left sternal border.  Heart rate is 78) heard.   Pulmonary:      Effort: Pulmonary effort is normal. No respiratory distress.      Breath sounds: Normal breath sounds.   Abdominal:      Palpations: Abdomen is soft.      Tenderness: There is no abdominal tenderness.   Musculoskeletal:         General: No swelling.      Cervical back: Neck supple. No tenderness.      Right lower leg: No edema.      Left lower leg: No edema.   Lymphadenopathy:      Cervical: No cervical adenopathy.   Skin:     General: Skin is warm and dry.      Capillary Refill: Capillary refill takes less than 2 seconds.      Findings: No rash.   Neurological:      Mental Status: She is alert.      Motor: No weakness.      Coordination: Coordination normal.      Gait: Gait normal.      Deep Tendon Reflexes: Reflexes abnormal (Reflexes 1+).   Psychiatric:         Mood and Affect: Mood normal.   "       Behavior: Behavior normal.         Thought Content: Thought content normal.         Judgment: Judgment normal.

## 2024-10-14 ENCOUNTER — OFFICE VISIT (OUTPATIENT)
Dept: CARDIOLOGY CLINIC | Facility: CLINIC | Age: 64
End: 2024-10-14
Payer: COMMERCIAL

## 2024-10-14 ENCOUNTER — HOSPITAL ENCOUNTER (EMERGENCY)
Facility: HOSPITAL | Age: 64
Discharge: HOME | End: 2024-10-14
Attending: EMERGENCY MEDICINE
Payer: COMMERCIAL

## 2024-10-14 VITALS
SYSTOLIC BLOOD PRESSURE: 116 MMHG | OXYGEN SATURATION: 99 % | HEIGHT: 68 IN | WEIGHT: 221 LBS | BODY MASS INDEX: 33.49 KG/M2 | HEART RATE: 71 BPM | DIASTOLIC BLOOD PRESSURE: 68 MMHG

## 2024-10-14 VITALS
BODY MASS INDEX: 33.49 KG/M2 | HEART RATE: 76 BPM | DIASTOLIC BLOOD PRESSURE: 74 MMHG | SYSTOLIC BLOOD PRESSURE: 141 MMHG | TEMPERATURE: 98 F | HEIGHT: 68 IN | OXYGEN SATURATION: 99 % | WEIGHT: 221 LBS | RESPIRATION RATE: 17 BRPM

## 2024-10-14 DIAGNOSIS — M25.561 CHRONIC PAIN OF RIGHT KNEE: Primary | ICD-10-CM

## 2024-10-14 DIAGNOSIS — I48.92 ATRIAL FLUTTER, PAROXYSMAL (HCC): ICD-10-CM

## 2024-10-14 DIAGNOSIS — I25.5 ISCHEMIC CARDIOMYOPATHY: Primary | ICD-10-CM

## 2024-10-14 DIAGNOSIS — I25.10 CORONARY ARTERY DISEASE INVOLVING NATIVE CORONARY ARTERY OF NATIVE HEART WITHOUT ANGINA PECTORIS: ICD-10-CM

## 2024-10-14 DIAGNOSIS — I50.22 CHRONIC HFREF (HEART FAILURE WITH REDUCED EJECTION FRACTION) (HCC): ICD-10-CM

## 2024-10-14 DIAGNOSIS — G89.29 CHRONIC PAIN OF RIGHT KNEE: Primary | ICD-10-CM

## 2024-10-14 PROCEDURE — 99283 EMERGENCY DEPT VISIT LOW MDM: CPT

## 2024-10-14 PROCEDURE — 99284 EMERGENCY DEPT VISIT MOD MDM: CPT | Performed by: EMERGENCY MEDICINE

## 2024-10-14 PROCEDURE — 99214 OFFICE O/P EST MOD 30 MIN: CPT | Performed by: STUDENT IN AN ORGANIZED HEALTH CARE EDUCATION/TRAINING PROGRAM

## 2024-10-14 RX ORDER — FUROSEMIDE 20 MG/1
20 TABLET ORAL DAILY
Qty: 45 TABLET | Refills: 5 | Status: SHIPPED | OUTPATIENT
Start: 2024-10-14 | End: 2026-04-07

## 2024-10-14 RX ORDER — LIDOCAINE 50 MG/G
1 PATCH TOPICAL ONCE
Status: DISCONTINUED | OUTPATIENT
Start: 2024-10-14 | End: 2024-10-14 | Stop reason: HOSPADM

## 2024-10-14 RX ADMIN — LIDOCAINE 1 PATCH: 50 PATCH CUTANEOUS at 20:40

## 2024-10-14 NOTE — ASSESSMENT & PLAN NOTE
Cardiac output has markedly improved.  Continue to push activity as tolerated and continue the cardiac rehab and follow-up

## 2024-10-14 NOTE — ASSESSMENT & PLAN NOTE
Doing better.  Continue to watch diet and continue current medication  Lab Results   Component Value Date    HGBA1C 7.2 (H) 09/27/2024

## 2024-10-14 NOTE — Clinical Note
Vesna Langston was seen and treated in our emergency department on 10/14/2024.        No work until cleared by Family Doctor/Orthopedics        Diagnosis:     Vesna  .    She may return on this date:          If you have any questions or concerns, please don't hesitate to call.      Shruthi Manzanares, DO    ______________________________           _______________          _______________  Hospital Representative                              Date                                Time

## 2024-10-14 NOTE — ASSESSMENT & PLAN NOTE
Weight is down about 2-1/2 pounds I feel overall is doing well.  Will continue with the current regimen

## 2024-10-15 ENCOUNTER — OFFICE VISIT (OUTPATIENT)
Dept: OBGYN CLINIC | Facility: CLINIC | Age: 64
End: 2024-10-15
Payer: COMMERCIAL

## 2024-10-15 ENCOUNTER — PREP FOR PROCEDURE (OUTPATIENT)
Dept: OBGYN CLINIC | Facility: CLINIC | Age: 64
End: 2024-10-15

## 2024-10-15 VITALS
OXYGEN SATURATION: 98 % | HEIGHT: 68 IN | HEART RATE: 65 BPM | DIASTOLIC BLOOD PRESSURE: 74 MMHG | BODY MASS INDEX: 33.46 KG/M2 | WEIGHT: 220.8 LBS | SYSTOLIC BLOOD PRESSURE: 132 MMHG | TEMPERATURE: 98.1 F

## 2024-10-15 DIAGNOSIS — M67.442 GANGLION CYST OF FINGER OF LEFT HAND: Primary | ICD-10-CM

## 2024-10-15 PROCEDURE — 99214 OFFICE O/P EST MOD 30 MIN: CPT | Performed by: STUDENT IN AN ORGANIZED HEALTH CARE EDUCATION/TRAINING PROGRAM

## 2024-10-15 RX ORDER — CHLORHEXIDINE GLUCONATE ORAL RINSE 1.2 MG/ML
15 SOLUTION DENTAL ONCE
Status: CANCELLED | OUTPATIENT
Start: 2024-10-15 | End: 2024-10-15

## 2024-10-15 NOTE — PROGRESS NOTES
Orthopedic Surgery Management Note  Vesna Langston (64 y.o. female)  : 1960 Encounter Date: 10/15/2024    Assessment/Plan:  64 y.o. female with left ganglion cyst on the left index finger.  The mass is on the volar ulnar aspect of the index finger appears to be emanating from the DIP and then proceeding distally.  I obtained an ultrasound at her last visit because the mass was in a somewhat atypical location for a ganglion of the DIP joint.  The ultrasound confirmed it is a ganglion cyst.  Discussed with the patient she has had longstanding functional issues related to the mass.  For her job and her hobbies she does a lot of arts and crafts and the location of the mass is causing tenderness of the finger and difficulty flexing the finger.  Specifically she has point tenderness directly over the mass with pinching and gripping.  She has had it for some time and attempted conservative management and failed with persistent pain and functional limitation, I think she would be appropriate indicated for operative treatment with excision of the mass. The nature of the condition in general and specifically that involving the patient's condition has been reviewed in detail with the patient and family today. The indications for surgical versus nonsurgical management of the condition has been advised, including the inherent risks, benefits, prognosis of the condition. Surgical risks including infection, nerve or blood vessel injury, stiffness, chronic regional pain syndrome, incomplete recovery, incomplete relief or worsening of symptoms, recurrence have been advised. The nature of the patient's preoperative subjective and clinical history and pertinent test results are consistent with the above diagnosis and the proposed treatment is felt to be reasonable and medically necessary. The patient verbalizes a sound understanding of our discussion and elects to proceed with the proposed procedure. All questions were answered  and no guarantees have been given regarding the patient's condition and treatment recommendations.     Tylenol Motrin for pain  We will perform wide-awake local only surgery for removal.  The patient has a complex medical history which I think supports the use of local only as well.  The patient is aware of this and aware of the protocol for this.  Patient will follow-up 10 to 14 days after surgery      Subjective:  64 y.o. female presenting to the office for follow up after ultrasound of left index finger. Today, she feels that the cyst may have gotten a little bigger and it is annoying. She would like to have the cyst removed as it is affecting her hobbies and daily tasks.       Review of Systems  Review of systems negative unless otherwise specified in HPI    Past Medical History  Past Medical History:   Diagnosis Date    Acute respiratory insufficiency 03/22/2024    Allergic     Chronic HFrEF (heart failure with reduced ejection fraction) ([facility])     Coronary artery disease     COVID-19 virus infection 11/28/2022    Diabetes mellitus (HCC)     Disease of thyroid gland 4/09/2024    Heart disease 3/24    Hyperlipidemia     Hypoglycemia     ICD (implantable cardioverter-defibrillator) in place     Ischemic cardiomyopathy     Lactic acidosis 03/22/2024    Myocardial infarction (HCC) 3/22/2024    Otitis media 1966    ST elevation myocardial infarction involving left anterior descending (LAD) coronary artery ([facility]) 03/22/2024    Tobacco abuse     Visual impairment 1967     Past Surgical History  Past Surgical History:   Procedure Laterality Date    ADENOIDECTOMY      APPENDECTOMY      APPENDECTOMY LAPAROSCOPIC N/A 05/08/2024    Procedure: APPENDECTOMY LAPAROSCOPIC;  Surgeon: Lauryn Ullrich, DO;  [facility]: [facility];  Service: General    BACK SURGERY  8/23    BREAST EXCISIONAL BIOPSY Right 09/2000    cyst removed- benign    CARDIAC CATHETERIZATION N/A 03/22/2024    Procedure: Cardiac pci;  Surgeon: Gabriel Myers MD;   [facility];  Service: Cardiology    CARDIAC CATHETERIZATION N/A 2024    Procedure: Cardiac Coronary Angiogram;  Surgeon: Gabriel Myers MD;  [facility];  Service: Cardiology    CARDIAC CATHETERIZATION N/A 2024    Procedure: Cardiac PCI AMI;  Surgeon: Gabriel Myers MD;  [facility];  Service: Cardiology    CARDIAC CATHETERIZATION N/A 2024    Procedure: Cardiac IVUS;  Surgeon: Gabriel Myers MD;  [facility];  Service: Cardiology    CARDIAC ELECTROPHYSIOLOGY PROCEDURE N/A 2024    Procedure: Cardiac icd implant;  Surgeon: Jeffy Lama MD;  [facility];  Service: Cardiology     SECTION      ECTOPIC PREGNANCY SURGERY      SEPTOPLASTY      SPINE SURGERY  23    TONSILLECTOMY       Current Medications  Current Outpatient Medications on File Prior to Visit   Medication Sig Dispense Refill    albuterol (ProAir HFA) 90 mcg/act inhaler Inhale 2 puffs every 6 (six) hours as needed for wheezing 8.5 g 0    amiodarone 200 mg tablet Take 1 tablet (200 mg total) by mouth daily 90 tablet 0    apixaban (ELIQUIS) 5 mg Take 1 tablet (5 mg total) by mouth 2 (two) times a day 60 tablet 5    atorvastatin (LIPITOR) 80 mg tablet TAKE 1 TABLET BY MOUTH EVERY EVENING 100 tablet 1    clopidogrel (PLAVIX) 75 mg tablet Take 1 tablet (75 mg total) by mouth daily 30 tablet 5    diphenhydrAMINE (BENADRYL) 25 mg capsule Take 25 mg by mouth every 6 (six) hours as needed for itching      Durolane 60 MG/3ML injection       Empagliflozin (Jardiance) 10 MG TABS tablet Take 1 tablet (10 mg total) by mouth every morning 90 tablet 1    furosemide (LASIX) 20 mg tablet Take 1 tablet (20 mg total) by mouth daily Alternating every other day with 1 tablet daily 45 tablet 5    gabapentin (Neurontin) 100 mg capsule Take 1 capsule (100 mg total) by mouth 2 (two) times a day      glimepiride (AMARYL) 1 mg tablet Take 1 tablet (1 mg total) by  mouth daily with breakfast 90 tablet 3    HYDROcodone-acetaminophen (NORCO) 5-325 mg per tablet Take 1 tablet by mouth every 6 (six) hours as needed for pain for up to 10 doses Max Daily Amount: 4 tablets 10 tablet 0    isosorbide mononitrate (IMDUR) 30 mg 24 hr tablet Take 1 tablet (30 mg total) by mouth daily 90 tablet 5    levothyroxine (Synthroid) 75 mcg tablet Take 1 tablet (75 mcg total) by mouth daily 90 tablet 3    meclizine (ANTIVERT) 25 mg tablet Take 1 tablet (25 mg total) by mouth every 8 (eight) hours as needed for dizziness 30 tablet 0    metoprolol succinate (TOPROL-XL) 25 mg 24 hr tablet Take 2 tablets (50 mg total) by mouth daily at bedtime 180 tablet 5    pantoprazole (PROTONIX) 40 mg tablet TAKE 1 TABLET BY MOUTH 2 TIMES A DAY BEFORE MEALS. 60 tablet 3    sacubitril-valsartan (Entresto) 49-51 MG TABS Take 1 tablet by mouth 2 (two) times a day 60 tablet 17    spironolactone (ALDACTONE) 25 mg tablet Take 1 tablet (25 mg total) by mouth daily 90 tablet 5     Current Facility-Administered Medications on File Prior to Visit   Medication Dose Route Frequency Provider Last Rate Last Admin    [DISCONTINUED] lidocaine (LIDODERM) 5 % patch 1 patch  1 patch Topical Once Shruthi Manzanares DO   1 patch at 10/14/24 2040       Physical exam  Exam: left index finger  Evaluated the palm of her left hand there is a nodule over the pretendinous cord of the ring finger approximately at the distal palmar crease.  This is consistent with a Dupuytren's nodule.  There is no flexion deformity or contracture associated with this and she has no functional limitations from this.  There is no tenderness with palpation of this nodule  There is a mass over the volar ulnar aspect of the left index finger DIP joint.  This spans the DIP joint, is centered over the DIP joint, and is approximately a centimeter and a half in length by half a centimeter in width.  The mass is bilobed, firm, and nonmobile.  The mass does not translate  with flexion extension.  The mass is ulnar to midline.  There is no overlying erythema.  There were no other masses in the hand.  There is no overlying skin changes associated with the mass.  Palpation of the mass it is slightly tender and notably with Tinel of the mass she has some slight electrical sensation at the tip of the finger.  Skin is warm and dry to touch with no signs of erythema, ecchymosis, or infection  Full FDS, FDP, extensor mechanisms are intact  No rotational deformity with composite finger flexion  Demonstrates normal wrist, elbow, and shoulder motion  Forearm compartments are soft and supple  2+ distal radial pulse with brisk capillary refill to the fingers  Radial, median, and ulnar motor and sensory distribution intact  Sensations light to touch intact distally         Imaging:  Study type: ULTRASOUND left hand  Date: 10/6/2024  I have personally reviewed the imaging in PACS and my impression is as follows: The ultrasound demonstrates a 1 cm x 2 cm lobulated ganglion cyst adjacent to the index finger DIP joint with a small stalk contiguous with the DIP joint space      Scribe Attestation      I,:  Tamika Hunter am acting as a scribe while in the presence of the attending physician.:       I,:  Leandro Campos MD personally performed the services described in this documentation    as scribed in my presence.:

## 2024-10-15 NOTE — ED PROVIDER NOTES
Time reflects when diagnosis was documented in both MDM as applicable and the Disposition within this note       Time User Action Codes Description Comment    10/14/2024  8:35 PM GlennaShruthi Add [M25.561,  G89.29] Chronic pain of right knee           ED Disposition       ED Disposition   Discharge    Condition   Stable    Date/Time   Mon Oct 14, 2024  8:36 PM    Comment   Vesna Langston discharge to home/self care.                   Assessment & Plan       Medical Decision Making   Patient presents with right knee pain.  Given history, exam and work-up, patient likely has right knee strain versus exacerbation of pain from osteoarthritis.  I have low suspicion for fracture, dislocation, significant ligamentous injury, septic arthritis, gout flare, new autoimmune arthropathy or gonococcal arthropathy.      Problems Addressed:  Chronic pain of right knee: chronic illness or injury with exacerbation, progression, or side effects of treatment    Risk  OTC drugs.  Prescription drug management.             Medications   lidocaine (LIDODERM) 5 % patch 1 patch (1 patch Topical Medication Applied 10/14/24 2040)       ED Risk Strat Scores                                               History of Present Illness       Chief Complaint   Patient presents with    Knee Pain     Pt having continued knee pain from multiple diagnosed injuries and is unable to walk. Pt seen at [facility] Tuesday and given knee brace with no relief. Pt took tylenol @ 1600 with no relief.        Past Medical History:   Diagnosis Date    Acute respiratory insufficiency 03/22/2024    Allergic     Chronic HFrEF (heart failure with reduced ejection fraction) (HCC)     Coronary artery disease     COVID-19 virus infection 11/28/2022    Diabetes mellitus (HCC)     Disease of thyroid gland 4/09/2024    Heart disease 3/24    Hyperlipidemia     Hypoglycemia     ICD (implantable cardioverter-defibrillator) in place     Ischemic cardiomyopathy     Lactic acidosis 03/22/2024     Myocardial infarction (HCC) 3/22/2024    Otitis media 1966    ST elevation myocardial infarction involving left anterior descending (LAD) coronary artery ([facility]) 2024    Tobacco abuse     Visual impairment 1967      Past Surgical History:   Procedure Laterality Date    ADENOIDECTOMY      APPENDECTOMY      APPENDECTOMY LAPAROSCOPIC N/A 2024    Procedure: APPENDECTOMY LAPAROSCOPIC;  Surgeon: Lauryn Ullrich, DO;  Location: [facility];  Service: General    BACK SURGERY      BREAST EXCISIONAL BIOPSY Right 2000    cyst removed- benign    CARDIAC CATHETERIZATION N/A 2024    Procedure: Cardiac pci;  Surgeon: Gabriel Myers MD;  [facility];  Service: Cardiology    CARDIAC CATHETERIZATION N/A 2024    Procedure: Cardiac Coronary Angiogram;  Surgeon: Gabriel Myers MD;  [facility];  Service: Cardiology    CARDIAC CATHETERIZATION N/A 2024    Procedure: Cardiac PCI AMI;  Surgeon: Gabriel Myers MD;  [facility];  Service: Cardiology    CARDIAC CATHETERIZATION N/A 2024    Procedure: Cardiac IVUS;  Surgeon: Gabriel Myers MD;  [facility];  Service: Cardiology    CARDIAC ELECTROPHYSIOLOGY PROCEDURE N/A 2024    Procedure: Cardiac icd implant;  Surgeon: Jeffy Lama MD;  [facility];  Service: Cardiology     SECTION      ECTOPIC PREGNANCY SURGERY      SEPTOPLASTY      SPINE SURGERY  23    TONSILLECTOMY        Family History   Adopted: Yes   Problem Relation Age of Onset    Depression Mother     Heart disease Father     OCD Daughter       Social History     Tobacco Use    Smoking status: Former     Current packs/day: 0.00     Average packs/day: 1 pack/day for 24.2 years (24.2 ttl pk-yrs)     Types: Cigarettes     Start date: 2000     Quit date: 3/22/2024     Years since quittin.5     Passive exposure: Past    Smokeless tobacco: Never    Tobacco comments:     Smoked 0.5-1 ppd; quit  in 03/2024.    Vaping Use    Vaping status: Never Used   Substance Use Topics    Alcohol use: Not Currently     Alcohol/week: 1.0 standard drink of alcohol     Types: 1 Shots of liquor per week     Comment: Every 2or 3months    Drug use: Never      E-Cigarette/Vaping    E-Cigarette Use Never User       E-Cigarette/Vaping Substances    Nicotine No     THC No     CBD No     Flavoring No     Other No     Unknown No       I have reviewed and agree with the history as documented.     Patient is a 64-year-old female presenting to the emergency department complaining of worsening right knee pain, has a history of chronic knee pain and is followed by orthopedics, states she had a gel injection 10 days ago and has been having increased pain since then, she did call orthopedics to get a follow-up appointment but is unable to do so until Wednesday, she has hydrocodone at home which she is told to take at night, but she does not like taking pain medication, she also cannot take NSAIDs secondary to history of bleeding ulcer, she has been taking Tylenol which provides minimal relief of pain, denies any injury or trauma, no fever or chills        Review of Systems   Constitutional: Negative.    HENT: Negative.     Eyes: Negative.    Respiratory: Negative.     Cardiovascular: Negative.    Gastrointestinal: Negative.    Endocrine: Negative.    Genitourinary: Negative.    Musculoskeletal:  Positive for arthralgias.   Skin: Negative.    Allergic/Immunologic: Negative.    Neurological: Negative.    Hematological: Negative.    Psychiatric/Behavioral: Negative.             Objective       ED Triage Vitals [10/14/24 2021]   Temperature Pulse Blood Pressure Respirations SpO2 Patient Position - Orthostatic VS   98 °F (36.7 °C) 76 141/74 17 99 % Sitting      Temp Source Heart Rate Source BP Location FiO2 (%) Pain Score    Temporal Monitor Left arm -- --      Vitals      Date and Time Temp Pulse SpO2 Resp BP Pain Score FACES Pain Rating User    10/14/24 2021 98 °F (36.7 °C) 76 99 % 17 141/74 -- -- RG            Physical Exam  Constitutional:       Appearance: She is well-developed.   HENT:      Head: Normocephalic and atraumatic.   Eyes:      Conjunctiva/sclera: Conjunctivae normal.      Pupils: Pupils are equal, round, and reactive to light.   Cardiovascular:      Rate and Rhythm: Normal rate.   Pulmonary:      Effort: Pulmonary effort is normal.   Abdominal:      Palpations: Abdomen is soft.   Musculoskeletal:         General: Normal range of motion.      Cervical back: Normal range of motion and neck supple.        Legs:       Comments: Tenderness to palpate on the lateral portion of the right knee, no erythema, no increased temperature, no bony deformity, no calf tenderness, distal sensation and motor is intact   Skin:     General: Skin is warm and dry.   Neurological:      Mental Status: She is alert and oriented to person, place, and time.         Results Reviewed       None            No orders to display       Procedures    ED Medication and Procedure Management   Prior to Admission Medications   Prescriptions Last Dose Informant Patient Reported? Taking?   Durolane 60 MG/3ML injection  Self Yes No   Empagliflozin (Jardiance) 10 MG TABS tablet  Self No No   Sig: Take 1 tablet (10 mg total) by mouth every morning   HYDROcodone-acetaminophen (NORCO) 5-325 mg per tablet  Self No No   Sig: Take 1 tablet by mouth every 6 (six) hours as needed for pain for up to 10 doses Max Daily Amount: 4 tablets   albuterol (ProAir HFA) 90 mcg/act inhaler  Self No No   Sig: Inhale 2 puffs every 6 (six) hours as needed for wheezing   amiodarone 200 mg tablet  Self No No   Sig: Take 1 tablet (200 mg total) by mouth daily   apixaban (ELIQUIS) 5 mg  Self No No   Sig: Take 1 tablet (5 mg total) by mouth 2 (two) times a day   atorvastatin (LIPITOR) 80 mg tablet  Self No No   Sig: TAKE 1 TABLET BY MOUTH EVERY EVENING   clopidogrel (PLAVIX) 75 mg tablet  Self No No   Sig:  Take 1 tablet (75 mg total) by mouth daily   diphenhydrAMINE (BENADRYL) 25 mg capsule  Self Yes No   Sig: Take 25 mg by mouth every 6 (six) hours as needed for itching   furosemide (LASIX) 20 mg tablet   No No   Sig: Take 1 tablet (20 mg total) by mouth daily Alternating every other day with 1 tablet daily   gabapentin (Neurontin) 100 mg capsule  Self No No   Sig: Take 1 capsule (100 mg total) by mouth 2 (two) times a day   glimepiride (AMARYL) 1 mg tablet  Self No No   Sig: Take 1 tablet (1 mg total) by mouth daily with breakfast   isosorbide mononitrate (IMDUR) 30 mg 24 hr tablet  Self No No   Sig: Take 1 tablet (30 mg total) by mouth daily   levothyroxine (Synthroid) 75 mcg tablet  Self No No   Sig: Take 1 tablet (75 mcg total) by mouth daily   meclizine (ANTIVERT) 25 mg tablet  Self No No   Sig: Take 1 tablet (25 mg total) by mouth every 8 (eight) hours as needed for dizziness   metoprolol succinate (TOPROL-XL) 25 mg 24 hr tablet  Self No No   Sig: Take 2 tablets (50 mg total) by mouth daily at bedtime   pantoprazole (PROTONIX) 40 mg tablet  Self No No   Sig: TAKE 1 TABLET BY MOUTH 2 TIMES A DAY BEFORE MEALS.   sacubitril-valsartan (Entresto) 49-51 MG TABS  Self No No   Sig: Take 1 tablet by mouth 2 (two) times a day   spironolactone (ALDACTONE) 25 mg tablet  Self No No   Sig: Take 1 tablet (25 mg total) by mouth daily      Facility-Administered Medications: None     Patient's Medications   Discharge Prescriptions    No medications on file     No discharge procedures on file.  ED SEPSIS DOCUMENTATION   Time reflects when diagnosis was documented in both MDM as applicable and the Disposition within this note       Time User Action Codes Description Comment    10/14/2024  8:35 PM Shruthi Manzanares Add [M25.561,  G89.29] Chronic pain of right knee                  Shruthi Manzanares DO  10/14/24 2042

## 2024-10-16 ENCOUNTER — OFFICE VISIT (OUTPATIENT)
Dept: OBGYN CLINIC | Facility: CLINIC | Age: 64
End: 2024-10-16
Payer: COMMERCIAL

## 2024-10-16 VITALS
TEMPERATURE: 97.7 F | SYSTOLIC BLOOD PRESSURE: 140 MMHG | DIASTOLIC BLOOD PRESSURE: 76 MMHG | OXYGEN SATURATION: 98 % | HEART RATE: 78 BPM | HEIGHT: 68 IN | WEIGHT: 220 LBS | BODY MASS INDEX: 33.34 KG/M2

## 2024-10-16 DIAGNOSIS — M25.461 EFFUSION OF RIGHT KNEE: ICD-10-CM

## 2024-10-16 DIAGNOSIS — G89.29 CHRONIC PAIN OF RIGHT KNEE: Primary | ICD-10-CM

## 2024-10-16 DIAGNOSIS — Z87.828 HISTORY OF TORN MENISCUS OF RIGHT KNEE: ICD-10-CM

## 2024-10-16 DIAGNOSIS — M17.11 PRIMARY OSTEOARTHRITIS OF RIGHT KNEE: ICD-10-CM

## 2024-10-16 DIAGNOSIS — M25.561 CHRONIC PAIN OF RIGHT KNEE: Primary | ICD-10-CM

## 2024-10-16 PROCEDURE — 99213 OFFICE O/P EST LOW 20 MIN: CPT | Performed by: STUDENT IN AN ORGANIZED HEALTH CARE EDUCATION/TRAINING PROGRAM

## 2024-10-16 PROCEDURE — 20611 DRAIN/INJ JOINT/BURSA W/US: CPT | Performed by: STUDENT IN AN ORGANIZED HEALTH CARE EDUCATION/TRAINING PROGRAM

## 2024-10-16 RX ORDER — LIDOCAINE HYDROCHLORIDE 10 MG/ML
3 INJECTION, SOLUTION INFILTRATION; PERINEURAL
Status: COMPLETED | OUTPATIENT
Start: 2024-10-16 | End: 2024-10-16

## 2024-10-16 RX ADMIN — LIDOCAINE HYDROCHLORIDE 3 ML: 10 INJECTION, SOLUTION INFILTRATION; PERINEURAL at 10:30

## 2024-10-16 NOTE — LETTER
October 16, 2024     Patient: Vesna Langston  YOB: 1960  Date of Visit: 10/16/2024      To Whom it May Concern:    Vesna Langston is under my professional care. Vesna was seen in my office on 10/16/2024. In regards to her right knee, restricted to sedentary work. If unable to accommodate sedentary work, restricted from work until follow up on 10/30/2024.    If you have any questions or concerns, please don't hesitate to call.         Sincerely,          Timmy Sanchez MD        CC: No Recipients

## 2024-10-16 NOTE — PROGRESS NOTES
1. Chronic pain of right knee  Large joint arthrocentesis: R knee      2. Primary osteoarthritis of right knee  Large joint arthrocentesis: R knee      3. History of torn meniscus of right knee  Large joint arthrocentesis: R knee      4. Effusion of right knee  Large joint arthrocentesis: R knee          Orders Placed This Encounter   Procedures   • Large joint arthrocentesis: R knee        Imaging Studies (I personally reviewed images in PACS and report):      X-ray right knee 7/26/2024: Mild tricompartmental osteophytic changes evidence by marginal osteophytes.  Superior patellar enthesophyte from quadriceps tendon.  Small joint effusion.                IMPRESSION:  Acute on chronic chronic right knee pain  Of note h/o fall on right knee in 2017-she has an MRI report as noted above indicating osteoarthritic changes as well as medial/lateral meniscal tears  Pain have an aggravating initially after starting cardiac rehab due to chronic systolic heart failure.  Patient noting that she is trying to become more active and go on 5K walks.  However her right knee pain is a limiting factor as pain worsens with prolonged walking  Radiographic imaging notes degenerative changes  Differential includes osteoarthritic pain versus pain from her degenerative meniscal tears versus pes anserine syndrome  Patient reports that she was told in the past that she can no longer have cortisone injections due to the potential side effect of straining her heart given her history of chronic systolic heart failure  Patient last seen on 10/8 in which hinged knee brace was prescribed for stability  Here today d/t worsening left knee pain/swelling/motion and inability to sustain weightbearing/walking despite bracing.  Denies new injury.  Aspiration revealing unremarkable appearing synovial fluid      Other factors:  Currently, patient states she is in the process of trying to undergo lumbar surgery for the hardware in place already.  She is  "trying to hold off any right knee surgical intervention if possible for her lumbar surgery. She has an upcoming finger surgery for ganglion removal on 10/18/2024  BMI 32  Congestive heart failure - reportedly under restrictions of no strenuous activities and seeing cardiac rehab currently  History of defibrillator implant  Type 2 diabetes    PLAN:    Clinical exam and radiographic imaging reviewed with patient today, with impression as per above. I have discussed with the patient the pathophysiology of this diagnosis and reviewed how the examination correlates with this diagnosis.    Treatment options were discussed and patient was agreeable to an attempt at aspirating her right knee under ultrasound guidance as per procedure note below after reviewing risk/benefits.  I counseled that given is only been about 2 weeks since her Visco injection, the medication may have not taken full effect yet as it can usually take up to 4 weeks.  Given her reported worsening pain and symptoms however I did offer that we could obtain an MRI of her affected knee without contrast for further evaluation.  She does have an MRI of her left knee from 2017 that mentioned her already having a meniscus tear.  Patient prefers to hold off further imaging at this time but would need restrictions at work as she does not feel she can perform her work duties due to limited ability to weight-bear without use of a cane/walker, brace.  Note provided today as per communications..      Return for Established f/u on 10/30/2024 to follow up from visco injection.    Portions of the record may have been created with voice recognition software. Occasional wrong word or \"sound a like\" substitutions may have occurred due to the inherent limitations of voice recognition software. Read the chart carefully and recognize, using context, where substitutions have occurred.     CHIEF COMPLAINT:  Right knee pain follow up      HPI:  Vesna Langston is a 64 y.o. female  " "who presents for     Visit 10/16/2024:  Follow-up evaluation of right knee pain  Of note patient underwent a viscosupplementation injection of her right knee on 10/3/2024  She has a history of a meniscus tear of his right knee that she did not ultimately undergo surgical intervention -reportedly managed conservatively with cortisone injections, bracing, physical therapy.  However, due to her history of congestive heart failure, STEMI, patient was told to no longer receive cortisone injections of her right knee or prednisone in general.  On last visit 10/8/2024, patient reported a sense of worsening pain and instability from excessive walking.  I did prescribe her a hinged knee brace at that time that briefly did provide relief and support of her knee.  However patient had to go to the ER yesterday due to worsening pain, swelling and bruising of her knee.  She denies any new injury of her knee.    Visit 10/8/2024:  Follow-up evaluation of right knee pain  Patient recently seen on 10/3/2024 for which she was given a viscosupplementation injection of her right knee.  Patient admits that she may have been \"excessively\" using her right lower extremity for weightbearing after this injection.  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  She does not do any squatting, climbing ladders or working at elevations.  She also has been trying to be more active and went on a 10 mile walk.  She states the ground was flat and did not aggravate her knee much.  However the following day she went [place] [employer] with her grandchildren.  She states the ground was much more unsteady and aggravating to her knee and towards the end of the day she felt a sense of instability of her knee, stiffness and sharp/aching pains over the medial/lateral aspects.  She states her instability was bad enough that she had to use a cane.  She does not have a brace but is here today asking whether we can supplement her " brace.    Visit 9/17/2024:  Follow-up of ration of right knee pain  Of note history as per below.  Patient has an old MRI noting arthritic changes as well as medial/lateral meniscus tears in the past.  We had talked about a cortisone injection of her knee but at the time she was told her cardiologist to restrict her from prednisone/cortisone due to her cardiac disease history.  There was consideration for viscosupplementation injection of her knee but it was deferred at the time.  However, today patient states she is interested in pursuing the Visco injection for her right knee.  She states she is try to be more active and attend 5K walks but feels that with prolonged walking she has worsening medial/lateral aching/sharp pains, swelling, stiffness and has to rest frequently.  She does feel that use of the compression knee sleeve has helped mitigate some of the pain with activities.  She denies any new injuries of her right knee since last visit.  She reports continued crepitus of her knee.  She is asking whether I could reach out to her cardiologist to determine the risk of a cortisone injection for the future but would like to pursue a Visco injection first.    Separately, patient also wanted to address a left index finger pain.  She states she has noticed a localized cystic swelling along the side of her DIP joint.  She states that it has become more aggravating to do her craft activities at home due to the pain and restricted motion of her DIP joint.  She denies any discoloration or N/T.  She denies any injury of her index finger.  She had seen her PCP for this issue and was told it was a ganglion cyst and is here today to discuss whether there are treatments for this issue going forward there.  She states she has had prior fractures of her index finger in the past and thinks it may be due to the arthritis in her finger.    Visit 7/26/2024 :  Initial evaluation of right knee pain  Of note, patient reports a prior  work injury of her right knee in the past from a fall.  She did bring in an imaging study from 2017 of her MRI as noted above.  Images are not available to review today.  Reports she did not end up undergoing surgical interventions for her right knee and was treated conservatively with injections, bracing, physical therapy for period of time.  She states overall these interventions provided some relief but never felt that her pain was fully resolved.  Furthermore, she is currently undergoing cardiac rehab given her history of systolic congestive heart failure, h/o STEMI.  She states she is under a restriction of no strenuous activities.  She states she previously underwent cortisone injections but due to her cardiac condition, she was told that she can no longer receive cortisone/prednisone.  In regards to her right knee pain, she reports has been worsening over the past several weeks.  No precipitating injury.  She states the pain is mainly over the medial aspect of her knee.  Despite being on a limited activity restriction from cardiology, she feels it can be aggravated after performing stationary biking.  Describes the pain as a burning sensation of moderate to severe intensity depending on how active she is.  She denies any noticeable swelling.  Denies any discoloration.  Reports stiffness with knee range of motion's when aggravated.  Reports crepitus of her knee which is chronic.  In regards to treatment she reports use of Tylenol, icing and resting all of which provide some relief.  She is unsure if her prior injury/meniscal tears in the past are contributing factor as she does not want to undergo surgical intervention if possible.      Medical, Surgical, Family, and Social History    Past Medical History:   Diagnosis Date   • Acute respiratory insufficiency 03/22/2024   • Allergic    • Chronic HFrEF (heart failure with reduced ejection fraction) ([facility])    • Coronary artery disease    • COVID-19 virus infection  2022   • Diabetes mellitus (HCC)    • Disease of thyroid gland 2024   • Heart disease 3/24   • Hyperlipidemia    • Hypoglycemia    • ICD (implantable cardioverter-defibrillator) in place    • Ischemic cardiomyopathy    • Lactic acidosis 2024   • Myocardial infarction (HCC) 3/22/2024   • Otitis media 1966   • ST elevation myocardial infarction involving left anterior descending (LAD) coronary artery (HCC) 2024   • Tobacco abuse    • Visual impairment      Past Surgical History:   Procedure Laterality Date   • ADENOIDECTOMY     • APPENDECTOMY     • APPENDECTOMY LAPAROSCOPIC N/A 2024    Procedure: APPENDECTOMY LAPAROSCOPIC;  Surgeon: Lauryn Ullrich, DO;  Location: [facility];  Service: General   • BACK SURGERY     • BREAST EXCISIONAL BIOPSY Right 2000    cyst removed- benign   • CARDIAC CATHETERIZATION N/A 2024    Procedure: Cardiac pci;  Surgeon: Gabriel Myers MD;  [facility];  Service: Cardiology   • CARDIAC CATHETERIZATION N/A 2024    Procedure: Cardiac Coronary Angiogram;  Surgeon: Gabriel Myers MD;  [facility];  Service: Cardiology   • CARDIAC CATHETERIZATION N/A 2024    Procedure: Cardiac PCI AMI;  Surgeon: Gabriel Myers MD;  [facility];  Service: Cardiology   • CARDIAC CATHETERIZATION N/A 2024    Procedure: Cardiac IVUS;  Surgeon: Gabriel Myers MD;  [facility];  Service: Cardiology   • CARDIAC ELECTROPHYSIOLOGY PROCEDURE N/A 2024    Procedure: Cardiac icd implant;  Surgeon: Jeffy Lama MD;  [facility];  Service: Cardiology   •  SECTION     • ECTOPIC PREGNANCY SURGERY     • SEPTOPLASTY     • SPINE SURGERY  23   • TONSILLECTOMY       Social History   Social History     Substance and Sexual Activity   Alcohol Use Not Currently   • Alcohol/week: 1.0 standard drink of alcohol   • Types: 1 Shots of liquor per week    Comment: Every 2or 3months  "    Social History     Substance and Sexual Activity   Drug Use Never     Social History     Tobacco Use   Smoking Status Former   • Current packs/day: 0.00   • Average packs/day: 1 pack/day for 24.2 years (24.2 ttl pk-yrs)   • Types: Cigarettes   • Start date: 2000   • Quit date: 3/22/2024   • Years since quittin.5   • Passive exposure: Past   Smokeless Tobacco Never   Tobacco Comments    Smoked 0.5-1 ppd; quit in 2024.      Family History   Adopted: Yes   Problem Relation Age of Onset   • Depression Mother    • Heart disease Father    • OCD Daughter      Allergies   Allergen Reactions   • Fish-Derived Products - Food Allergy Anaphylaxis     Cannot have all seafood products   • Shellfish-Derived Products - Food Allergy Anaphylaxis   • Azithromycin Rash          Physical Exam  /76   Pulse 78   Temp 97.7 °F (36.5 °C) (Temporal)   Ht 5' 8\" (1.727 m)   Wt 99.8 kg (220 lb)   SpO2 98%   BMI 33.45 kg/m²     Constitutional:  see vital signs  Gen: well-developed, normocephalic/atraumatic, well-groomed  Eyes: No inflammation or discharge of conjunctiva or lids; sclera clear   Pulmonary/Chest: Effort normal. No respiratory distress.     Ortho Exam  Right Knee Exam:  Erythema: no  Swellin+  Increased Warmth: no  Tenderness: +MJL, +LJL  ROM: 0-90  Knee flexion strength: 4/5  Knee extension strength: 5/5  Patellar Grind: +  Varus laxity: negative  Valgus laxity: negative    No calf tenderness to palpation     Gait: Antalgic gait while using a hinged knee brace and single point cane for mobility.      Large joint arthrocentesis: R knee  [place] Protocol:  procedure performed by consultantConsent: Verbal consent obtained.  Risks and benefits: risks, benefits and alternatives were discussed  Consent given by: patient  Patient understanding: patient states understanding of the procedure being performed  Site marked: the operative site was marked  Radiology Images displayed and confirmed. If images not " available, report reviewed: imaging studies available  Patient identity confirmed: verbally with patient  Supporting Documentation  Indications: pain, diagnostic evaluation and joint swelling   Procedure Details  Location: knee - R knee  Preparation: Patient was prepped and draped in the usual sterile fashion  Needle size: 18 G  Ultrasound guidance: yes  Approach: anterolateral  Medications administered: 3 mL lidocaine 1 %    Aspirate amount: 15 mL  Aspirate: serous and yellow  Patient tolerance: patient tolerated the procedure well with no immediate complications  Dressing:  Sterile dressing applied

## 2024-10-17 ENCOUNTER — TELEPHONE (OUTPATIENT)
Dept: OBGYN CLINIC | Facility: HOSPITAL | Age: 64
End: 2024-10-17

## 2024-10-17 RX ORDER — SENNOSIDES 8.6 MG
650 CAPSULE ORAL EVERY 8 HOURS PRN
COMMUNITY
End: 2024-10-18

## 2024-10-17 NOTE — TELEPHONE ENCOUNTER
Called and spoke with Pt, She stated that since this message was placed a nurse did call her and go over surgery screening questions.

## 2024-10-17 NOTE — TELEPHONE ENCOUNTER
Caller: Vesna    Doctor: Andres    Reason for call:Patient is calling stating she was suppose to receive a call about instructions before surgery and then is aware she will get a call from 2-8 about the time of surgery.  She tried calling the numbers in her book but no one was able to help her. She said she was suppose to get a call from a nurse before her surgery and she never got a call. She said this was suppose to be sperate from the call about surgical time.     Call back#: [phone]

## 2024-10-18 ENCOUNTER — HOSPITAL ENCOUNTER (OUTPATIENT)
Facility: HOSPITAL | Age: 64
Setting detail: OUTPATIENT SURGERY
Discharge: HOME | End: 2024-10-18
Attending: STUDENT IN AN ORGANIZED HEALTH CARE EDUCATION/TRAINING PROGRAM | Admitting: STUDENT IN AN ORGANIZED HEALTH CARE EDUCATION/TRAINING PROGRAM
Payer: COMMERCIAL

## 2024-10-18 VITALS
OXYGEN SATURATION: 99 % | SYSTOLIC BLOOD PRESSURE: 123 MMHG | BODY MASS INDEX: 33.19 KG/M2 | WEIGHT: 219 LBS | HEART RATE: 66 BPM | DIASTOLIC BLOOD PRESSURE: 58 MMHG | RESPIRATION RATE: 16 BRPM | HEIGHT: 68 IN | TEMPERATURE: 98.1 F

## 2024-10-18 DIAGNOSIS — M67.442 GANGLION CYST OF FINGER OF LEFT HAND: ICD-10-CM

## 2024-10-18 LAB — GLUCOSE SERPL-MCNC: 87 MG/DL (ref 65–140)

## 2024-10-18 PROCEDURE — 88360 TUMOR IMMUNOHISTOCHEM/MANUAL: CPT | Performed by: PATHOLOGY

## 2024-10-18 PROCEDURE — 26160 REMOVE TENDON SHEATH LESION: CPT | Performed by: STUDENT IN AN ORGANIZED HEALTH CARE EDUCATION/TRAINING PROGRAM

## 2024-10-18 PROCEDURE — 88304 TISSUE EXAM BY PATHOLOGIST: CPT | Performed by: PATHOLOGY

## 2024-10-18 PROCEDURE — NC001 PR NO CHARGE: Performed by: STUDENT IN AN ORGANIZED HEALTH CARE EDUCATION/TRAINING PROGRAM

## 2024-10-18 PROCEDURE — 82948 REAGENT STRIP/BLOOD GLUCOSE: CPT

## 2024-10-18 PROCEDURE — NC001 PR NO CHARGE

## 2024-10-18 RX ORDER — TRAMADOL HYDROCHLORIDE 50 MG/1
50 TABLET ORAL EVERY 6 HOURS PRN
Status: CANCELLED | OUTPATIENT
Start: 2024-10-18

## 2024-10-18 RX ORDER — LIDOCAINE HYDROCHLORIDE AND EPINEPHRINE 10; 10 MG/ML; UG/ML
INJECTION, SOLUTION INFILTRATION; PERINEURAL AS NEEDED
Status: DISCONTINUED | OUTPATIENT
Start: 2024-10-18 | End: 2024-10-18 | Stop reason: HOSPADM

## 2024-10-18 RX ORDER — MAGNESIUM HYDROXIDE 1200 MG/15ML
LIQUID ORAL AS NEEDED
Status: DISCONTINUED | OUTPATIENT
Start: 2024-10-18 | End: 2024-10-18 | Stop reason: HOSPADM

## 2024-10-18 RX ORDER — CHLORHEXIDINE GLUCONATE ORAL RINSE 1.2 MG/ML
15 SOLUTION DENTAL ONCE
Status: COMPLETED | OUTPATIENT
Start: 2024-10-18 | End: 2024-10-18

## 2024-10-18 RX ORDER — ONDANSETRON 2 MG/ML
4 INJECTION INTRAMUSCULAR; INTRAVENOUS EVERY 6 HOURS PRN
Status: CANCELLED | OUTPATIENT
Start: 2024-10-18

## 2024-10-18 RX ORDER — ACETAMINOPHEN 500 MG
1000 TABLET ORAL EVERY 8 HOURS PRN
Qty: 60 TABLET | Refills: 0 | Status: SHIPPED | OUTPATIENT
Start: 2024-10-18 | End: 2024-10-29 | Stop reason: ALTCHOICE

## 2024-10-18 RX ORDER — TRAMADOL HYDROCHLORIDE 50 MG/1
50 TABLET ORAL EVERY 6 HOURS PRN
Qty: 5 TABLET | Refills: 0 | Status: SHIPPED | OUTPATIENT
Start: 2024-10-18 | End: 2024-10-28

## 2024-10-18 RX ADMIN — CHLORHEXIDINE GLUCONATE 15 ML: 1.2 RINSE ORAL at 11:14

## 2024-10-18 NOTE — H&P
Orthopedic Surgery Management Note  Vesna Langston (64 y.o. female)  : 1960 Encounter Date: 10/15/2024    Assessment/Plan:  64 y.o. female with left ganglion cyst on the left index finger.  The mass is on the volar ulnar aspect of the index finger appears to be emanating from the DIP and then proceeding distally.  I obtained an ultrasound at her last visit because the mass was in a somewhat atypical location for a ganglion of the DIP joint.  The ultrasound confirmed it is a ganglion cyst.  Discussed with the patient she has had longstanding functional issues related to the mass.  For her job and her hobbies she does a lot of arts and crafts and the location of the mass is causing tenderness of the finger and difficulty flexing the finger.  Specifically she has point tenderness directly over the mass with pinching and gripping.  She has had it for some time and attempted conservative management and failed with persistent pain and functional limitation, I think she would be appropriate indicated for operative treatment with excision of the mass. The nature of the condition in general and specifically that involving the patient's condition has been reviewed in detail with the patient and family today. The indications for surgical versus nonsurgical management of the condition has been advised, including the inherent risks, benefits, prognosis of the condition. Surgical risks including infection, nerve or blood vessel injury, stiffness, chronic regional pain syndrome, incomplete recovery, incomplete relief or worsening of symptoms, recurrence have been advised. The nature of the patient's preoperative subjective and clinical history and pertinent test results are consistent with the above diagnosis and the proposed treatment is felt to be reasonable and medically necessary. The patient verbalizes a sound understanding of our discussion and elects to proceed with the proposed procedure. All questions were answered  and no guarantees have been given regarding the patient's condition and treatment recommendations.     Tylenol Motrin for pain  We will perform wide-awake local only surgery for removal.  The patient has a complex medical history which I think supports the use of local only as well.  The patient is aware of this and aware of the protocol for this.  Patient will follow-up 10 to 14 days after surgery      Subjective:  64 y.o. female presenting to the office for follow up after ultrasound of left index finger. Today, she feels that the cyst may have gotten a little bigger and it is annoying. She would like to have the cyst removed as it is affecting her hobbies and daily tasks.       Review of Systems  Review of systems negative unless otherwise specified in HPI    Past Medical History  Past Medical History:   Diagnosis Date    Acute respiratory insufficiency 03/22/2024    Allergic     Chronic HFrEF (heart failure with reduced ejection fraction) ([facility])     Coronary artery disease     COVID-19 virus infection 11/28/2022    Diabetes mellitus (HCC)     Disease of thyroid gland 4/09/2024    Heart disease 3/24    Hyperlipidemia     Hypoglycemia     ICD (implantable cardioverter-defibrillator) in place     Ischemic cardiomyopathy     Lactic acidosis 03/22/2024    Myocardial infarction (HCC) 3/22/2024    Otitis media 1966    Seasonal allergies     ST elevation myocardial infarction involving left anterior descending (LAD) coronary artery (HCC) 03/22/2024    Tobacco abuse     Visual impairment 1967     Past Surgical History  Past Surgical History:   Procedure Laterality Date    ADENOIDECTOMY      APPENDECTOMY      APPENDECTOMY LAPAROSCOPIC N/A 05/08/2024    Procedure: APPENDECTOMY LAPAROSCOPIC;  Surgeon: Lauryn Ullrich, DO;  Location: [facility];  Service: General    BACK SURGERY  8/23    BREAST EXCISIONAL BIOPSY Right 09/2000    cyst removed- benign    CARDIAC CATHETERIZATION N/A 03/22/2024    Procedure: Cardiac pci;  Surgeon:  Gabriel Myers MD;  [facility];  Service: Cardiology    CARDIAC CATHETERIZATION N/A 2024    Procedure: Cardiac Coronary Angiogram;  Surgeon: Gabriel Myers MD;  [facility];  Service: Cardiology    CARDIAC CATHETERIZATION N/A 2024    Procedure: Cardiac PCI AMI;  Surgeon: Gabriel Myers MD;  [facility];  Service: Cardiology    CARDIAC CATHETERIZATION N/A 2024    Procedure: Cardiac IVUS;  Surgeon: Gabriel Myers MD;  [facility];  Service: Cardiology    CARDIAC ELECTROPHYSIOLOGY PROCEDURE N/A 2024    Procedure: Cardiac icd implant;  Surgeon: Jeffy Lama MD;  [facility];  Service: Cardiology     SECTION      ECTOPIC PREGNANCY SURGERY      SEPTOPLASTY      SPINE SURGERY  23    TONSILLECTOMY       Current Medications  No current facility-administered medications on file prior to encounter.     Current Outpatient Medications on File Prior to Encounter   Medication Sig Dispense Refill    acetaminophen (TYLENOL) 650 mg CR tablet Take 650 mg by mouth every 8 (eight) hours as needed for mild pain      albuterol (ProAir HFA) 90 mcg/act inhaler Inhale 2 puffs every 6 (six) hours as needed for wheezing 8.5 g 0    amiodarone 200 mg tablet Take 1 tablet (200 mg total) by mouth daily 90 tablet 0    apixaban (ELIQUIS) 5 mg Take 1 tablet (5 mg total) by mouth 2 (two) times a day 60 tablet 5    atorvastatin (LIPITOR) 80 mg tablet TAKE 1 TABLET BY MOUTH EVERY EVENING 100 tablet 1    clopidogrel (PLAVIX) 75 mg tablet Take 1 tablet (75 mg total) by mouth daily 30 tablet 5    diphenhydrAMINE (BENADRYL) 25 mg capsule Take 25 mg by mouth every 6 (six) hours as needed for itching      Empagliflozin (Jardiance) 10 MG TABS tablet Take 1 tablet (10 mg total) by mouth every morning 90 tablet 1    furosemide (LASIX) 20 mg tablet Take 1 tablet (20 mg total) by mouth daily Alternating every other day with 1 tablet daily 45  tablet 5    gabapentin (Neurontin) 100 mg capsule Take 1 capsule (100 mg total) by mouth 2 (two) times a day      glimepiride (AMARYL) 1 mg tablet Take 1 tablet (1 mg total) by mouth daily with breakfast 90 tablet 3    HYDROcodone-acetaminophen (NORCO) 5-325 mg per tablet Take 1 tablet by mouth every 6 (six) hours as needed for pain for up to 10 doses Max Daily Amount: 4 tablets 10 tablet 0    isosorbide mononitrate (IMDUR) 30 mg 24 hr tablet Take 1 tablet (30 mg total) by mouth daily 90 tablet 5    levothyroxine (Synthroid) 75 mcg tablet Take 1 tablet (75 mcg total) by mouth daily (Patient taking differently: Take 75 mcg by mouth daily Takes at lunch time) 90 tablet 3    meclizine (ANTIVERT) 25 mg tablet Take 1 tablet (25 mg total) by mouth every 8 (eight) hours as needed for dizziness 30 tablet 0    metoprolol succinate (TOPROL-XL) 25 mg 24 hr tablet Take 2 tablets (50 mg total) by mouth daily at bedtime 180 tablet 5    pantoprazole (PROTONIX) 40 mg tablet TAKE 1 TABLET BY MOUTH 2 TIMES A DAY BEFORE MEALS. 60 tablet 3    sacubitril-valsartan (Entresto) 49-51 MG TABS Take 1 tablet by mouth 2 (two) times a day 60 tablet 17    spironolactone (ALDACTONE) 25 mg tablet Take 1 tablet (25 mg total) by mouth daily 90 tablet 5    Durolane 60 MG/3ML injection          Physical exam  Exam: left index finger  Evaluated the palm of her left hand there is a nodule over the pretendinous cord of the ring finger approximately at the distal palmar crease.  This is consistent with a Dupuytren's nodule.  There is no flexion deformity or contracture associated with this and she has no functional limitations from this.  There is no tenderness with palpation of this nodule  There is a mass over the volar ulnar aspect of the left index finger DIP joint.  This spans the DIP joint, is centered over the DIP joint, and is approximately a centimeter and a half in length by half a centimeter in width.  The mass is bilobed, firm, and nonmobile.   The mass does not translate with flexion extension.  The mass is ulnar to midline.  There is no overlying erythema.  There were no other masses in the hand.  There is no overlying skin changes associated with the mass.  Palpation of the mass it is slightly tender and notably with Tinel of the mass she has some slight electrical sensation at the tip of the finger.  Skin is warm and dry to touch with no signs of erythema, ecchymosis, or infection  Full FDS, FDP, extensor mechanisms are intact  No rotational deformity with composite finger flexion  Demonstrates normal wrist, elbow, and shoulder motion  Forearm compartments are soft and supple  2+ distal radial pulse with brisk capillary refill to the fingers  Radial, median, and ulnar motor and sensory distribution intact  Sensations light to touch intact distally         Imaging:  Study type: ULTRASOUND left hand  Date: 10/6/2024  I have personally reviewed the imaging in PACS and my impression is as follows: The ultrasound demonstrates a 1 cm x 2 cm lobulated ganglion cyst adjacent to the index finger DIP joint with a small stalk contiguous with the DIP joint space      Scribe Attestation      I,:   am acting as a scribe while in the presence of the attending physician.:       I,:   personally performed the services described in this documentation    as scribed in my presence.:

## 2024-10-18 NOTE — OP NOTE
OPERATIVE REPORT  PATIENT NAME: Vesna Langston    :  1960  MRN: [id number]  Pt Location: [facility] OR ROOM 01    SURGERY DATE: 10/18/2024    Surgeons and Role:     * Leandro Campos MD - Primary    Preop Diagnosis:  Ganglion cyst of finger of left hand [M67.442]    Post-Op Diagnosis Codes:    Giant cell tumor    Procedure(s):  Left index finger mass excision    Specimen(s):  ID Type Source Tests Collected by Time Destination   1 : left index finger mass Tissue Soft Tissue, Other TISSUE EXAM Leandro Campos MD 10/18/2024 1219        Estimated Blood Loss:   Minimal    Drains:  * No LDAs found *    Anesthesia Type:   Local    Operative Indications:  Ganglion cyst of finger of left hand [M67.442]  64 y.o. female with left ganglion cyst on the left index finger.  The mass is on the volar ulnar aspect of the index finger appears to be emanating from the DIP and then proceeding distally.  I obtained an ultrasound at her last visit because the mass was in a somewhat atypical location for a ganglion of the DIP joint.  The ultrasound confirmed it is a ganglion cyst.  Discussed with the patient she has had longstanding functional issues related to the mass.  For her job and her hobbies she does a lot of arts and crafts and the location of the mass is causing tenderness of the finger and difficulty flexing the finger.  Specifically she has point tenderness directly over the mass with pinching and gripping.  She has had it for some time and attempted conservative management and failed with persistent pain and functional limitation, I think she would be appropriate indicated for operative treatment with excision of the mass.     PERIOPERATIVE ANTIBIOTICS:    None     Operative Findings:  Giant cell tumor     Procedure and Technique:  The patient was greeted in the preoperative area. The risks, benefits, and alternatives of the procedure were again discussed in detail with the patient. The patient understood the risks and was amenable  to proceed. The operative extremity was marked. Patient was brought to the operating room and placed under anesthesia. A tourniquet was applied. The operative extremity was prepped and draped in the usual sterile fashion. A timeout was performed identifying the name and laterality of the procedure.     10 cc of 1% lidocaine with epinephrine was administered at the planned surgical site.  The limb was exsanguinated and the tourniquet was inflated.     A 2 incision was made in a Tamiko fashion along the volar and ulnar border of the index finger at the DIP joint and just a little distal. Careful dissection down through the subcutaneous tissues, protecting the neurovascular bundles. The mass was identified and careful blunt dissection using dissecting scissors and a freer was performed to develop the borders of the mass.  On my evaluation the mass did not appear to be a cyst but rather was a tan-brown semifirm lobulated mass that is more consistent with a giant cell tumor. Once the envelope of the mass was fully exposed we removed the mass and whole with the capsule contained. The mass measured 2 cm by 1 cm and was cylindrical in shape. This was a marginal excision with roughly a 1 mm lesion free rim. There was no remnant mass debris left behind. The wound was then copiously irrigated with sterile saline.    The tourniquet was released and hemostasis achieved. The wound was closed with 4-0 chromic sutures in a simple interrupted fashion. Sterile dressings were applied. The patient was awoken and transported to the recovery room in stable condition.     Post operatively she is allowed to weight bear as tolerated for light activities. The dressing can be removed in 5 days. Follow up in 2 weeks as previously scheduled.     Tourniquet time: 11 minutes, 250 mmHg     Complications:   None     Patient Disposition:  PACU     Plan:  Keep dressing clean and dry for 5 days. Dressing can then be removed.  Activities as  tolerated  Follow up in office in 2 weeks              SIGNATURE: Leandro Campos MD  DATE: October 18, 2024  TIME: 12:46 PM

## 2024-10-18 NOTE — DISCHARGE INSTR - AVS FIRST PAGE
POSTOPERATIVE INSTRUCTIONS - Dr. Leandro Campos (Orthopedic Surgery)    Follow-up Appointments  Please call to set up/confirm your first postoperative visit with Dr. Campos in 10-14 days    Dressing and Wound Care  A dressing has been placed on your hand/arm to keep the incisions clean.  Keep your dressing clean and dry.     You may remove the dressing 5 days after surgery. Once the dressing is off, your incision can get wet while showering/bathing only. Do not soak the wound (in bath water, pool, lake, etc.). Otherwise, keep your incision covered with Band-Aids or light dressing. Keep it dry and do not apply any ointments.    Wear a plastic bag over your dressing/splint whenever you take a shower or bath until you are allowed to remove it  Swelling is normal after surgery.  Elevate your hand/arm so the surgical site is above your heart to decrease the swelling.  Swelling is like water, it runs downhill.  This is especially important for the first 72 hours after surgery.  The best way to elevate your hand/arm is with your fingers pointing towards the ceiling and your hand/arm above the level of the heart.  You can use pillows to help prop your hand/arm up when sitting or lying down.         If you are experiencing pain, be sure you are elevating your hand/arm as often as possible.  Apply an ice pack over your dressing/splint for 20 minutes of every hour for the first 3 days when you are awake. This can help to reduce swelling and inflammation. Be sure the ice pack is waterproof so it does not leak on the dressing/splint. A simple ice pack can be made by adding ten cubes and a small amount of water in a small zip-lock bag. Seal this small bag tightly. Place this small bag in a larger zip lock bag. Apply to the area in pain.  If the dressing feels too tight in spite of elevation, loosen the outer wrap but do not remove the entire dressing.  If you have exposed pins/wires, take clean gauze or a cotton tip applicator (like  a Q-tip), get it wet in clean warm water mixed with non-scented hand soap (like Dove, Ivory, Dial, etc.), and gently wipe around the base of the pin where it comes through the skin once a day. Do not use water from a well to clean as this has bacteria in it and can contribute to infections.     ACTIVITIES:  Bend and straighten the parts of your hand, wrist, elbow, and shoulder that are not included in your surgical dressing or splint. Do this at least 6 times a day, as this will help decrease swelling and speed up your recovery.  This includes your fingers. See the instructions listed below for finger motion. THIS IS VERY IMPORTANT FOR YOUR RECOVERY!        POSTOPERATIVE CARE/CONCERNS:  You may experience some temporary numbness in your fingers.  You should have very little to no bleeding on your dressing.  Notify the office (see contact info at bottom of page) for any of the following:  Excessive pain not relieved by rest, elevation, and pain medications  Feeling that the dressing is too tight in spite of adequately elevating hand/arm  Active bleeding through the dressing  Drainage from the wound site or pin sites  Foul odor from the dressing/wound  Temperature greater that 101? F or chills  Blue or excessively cold fingertips  Numbness of the fingertips that does not improve in spite of adequately elevating hand/arm    PAIN MEDICATION:   Pain is a normal part of the recovery after surgery. The pain medication provided to you will help to decrease the discomfort but will not completely eliminate the pain.      A prescription for a narcotic pain medicine (oxycodone or hydrocodone) and anti-inflammatory (naproxen) were called in to your pharmacy. Please take the anti-inflammatory medication AND over-the-counter acetaminophen (Tylenol) regularly. The instructions will be listed on the bottles. The narcotic pain medicine should be used for pain that is not controlled by these other medications and only for the first  few days after surgery. The narcotic is HIGHLY ADDICTIVE and has many side effects such as causing constipation, dizziness, confusion, decreased breathing and more. It is safe to take for a short period of time after surgery. It is almost never prescribed for longer than a few weeks and never after one month for elective surgeries.  Do not take narcotic or anti-inflammatories on an empty stomach.  It is illegal to drive while taking narcotic pain medication  Your pain should decrease over the first few days after surgery which will allow you to take less pain medicine, increase the time between doses of medication, or stop taking all pain medicine.      Additional Information  Remove dressings in 5 days and wash with soap and water.  Use a band-aid if needed.    Move your fingers 10 times per hour.  Do not move any splinted fingers., Ice to area 15 minutes each hour for 24 hours., No sling is needed.    For pain, please see your prescription for Tramadol, which should be taken as 1 tab, every 6 hours as needed.    Please arrange for a follow-up in 10-14 days.

## 2024-10-21 DIAGNOSIS — M17.11 PRIMARY OSTEOARTHRITIS OF RIGHT KNEE: ICD-10-CM

## 2024-10-21 DIAGNOSIS — G89.29 CHRONIC PAIN OF RIGHT KNEE: Primary | ICD-10-CM

## 2024-10-21 DIAGNOSIS — Z87.828 HISTORY OF TORN MENISCUS OF RIGHT KNEE: ICD-10-CM

## 2024-10-21 DIAGNOSIS — I50.22 CHRONIC HFREF (HEART FAILURE WITH REDUCED EJECTION FRACTION) (HCC): ICD-10-CM

## 2024-10-21 DIAGNOSIS — M25.561 CHRONIC PAIN OF RIGHT KNEE: Primary | ICD-10-CM

## 2024-10-21 RX ORDER — SACUBITRIL AND VALSARTAN 49; 51 MG/1; MG/1
1 TABLET, FILM COATED ORAL 2 TIMES DAILY
Qty: 180 TABLET | Refills: 3 | Status: CANCELLED | OUTPATIENT
Start: 2024-10-21 | End: 2026-04-14

## 2024-10-21 RX ORDER — LIDOCAINE 50 MG/G
1 PATCH TOPICAL DAILY
Qty: 30 PATCH | Refills: 2 | Status: SHIPPED | OUTPATIENT
Start: 2024-10-21 | End: 2025-01-19

## 2024-10-21 RX ORDER — SACUBITRIL AND VALSARTAN 49; 51 MG/1; MG/1
1 TABLET, FILM COATED ORAL 2 TIMES DAILY
Qty: 180 TABLET | Refills: 5 | Status: SHIPPED | OUTPATIENT
Start: 2024-10-21 | End: 2026-04-14

## 2024-10-21 NOTE — TELEPHONE ENCOUNTER
Medication: entresto 49-51mg tablets    Dose/Frequency: 1 tablet twice a day    Quantity: 180 tablets    Pharmacy: [facility]    Office:   [] PCP/Provider -   [x] Speciality/Provider -     Does the patient have enough for 3 days?   [x] Yes   [] No - Send as HP to POD

## 2024-10-21 NOTE — TELEPHONE ENCOUNTER
Patient is having a hard time getting medication at pharmacy. She would like a 90 day supply sent.

## 2024-10-22 ENCOUNTER — TELEPHONE (OUTPATIENT)
Age: 64
End: 2024-10-22

## 2024-10-22 NOTE — TELEPHONE ENCOUNTER
PA for SUBMITTED idocaine (LIDODERM) 5 %     via    []CMM-KEY:   [x]Surescripts-Case ID #   []Availity-Auth ID # NDC #   []Faxed to plan   []Other website   []Phone call Case ID #     Office notes sent, clinical questions answered. Awaiting determination    Turnaround time for your insurance to make a decision on your Prior Authorization can take 7-21 business days.

## 2024-10-22 NOTE — TELEPHONE ENCOUNTER
Caller: Vesna    Doctor: lashon    Reason for call: Patient is in need of a work note excusing her from work 10/18/24 till 10/29/24 when her follow up appointment is.  Please please in myc  Thank you    Call back#: [phone]

## 2024-10-22 NOTE — TELEPHONE ENCOUNTER
PA for LIDOCAINE  DENIED    Reason)        Message sent to office clinical pool Yes    Denial letter scanned into Media Yes    Appeal started No (Provider will need to decide if appeal is warranted and send clinical documentation to Prior Authorization Team for initiation.)    **Please follow up with your patient regarding denial and next steps**

## 2024-10-23 DIAGNOSIS — I48.92 ATRIAL FLUTTER, UNSPECIFIED TYPE (HCC): ICD-10-CM

## 2024-10-23 NOTE — TELEPHONE ENCOUNTER
Caller: Patient    Doctor: Andres    Reason for call: Patient is calling stating she is unable to see her note in [employer]. She is going to stop by the office to  a copy.    Call back#: [phone]

## 2024-10-24 PROCEDURE — 88304 TISSUE EXAM BY PATHOLOGIST: CPT | Performed by: PATHOLOGY

## 2024-10-24 PROCEDURE — 88360 TUMOR IMMUNOHISTOCHEM/MANUAL: CPT | Performed by: PATHOLOGY

## 2024-10-28 ENCOUNTER — APPOINTMENT (EMERGENCY)
Dept: RADIOLOGY | Facility: HOSPITAL | Age: 64
End: 2024-10-28
Payer: COMMERCIAL

## 2024-10-28 ENCOUNTER — HOSPITAL ENCOUNTER (EMERGENCY)
Facility: HOSPITAL | Age: 64
Discharge: HOME | End: 2024-10-28
Attending: EMERGENCY MEDICINE
Payer: COMMERCIAL

## 2024-10-28 ENCOUNTER — NURSE TRIAGE (OUTPATIENT)
Age: 64
End: 2024-10-28

## 2024-10-28 VITALS
TEMPERATURE: 98.2 F | SYSTOLIC BLOOD PRESSURE: 116 MMHG | RESPIRATION RATE: 18 BRPM | HEIGHT: 68 IN | BODY MASS INDEX: 33.34 KG/M2 | OXYGEN SATURATION: 97 % | DIASTOLIC BLOOD PRESSURE: 56 MMHG | HEART RATE: 66 BPM | WEIGHT: 220 LBS

## 2024-10-28 DIAGNOSIS — R06.00 DYSPNEA: Primary | ICD-10-CM

## 2024-10-28 LAB
2HR DELTA HS TROPONIN: 0 NG/L
ALBUMIN SERPL BCG-MCNC: 4.2 G/DL (ref 3.5–5)
ALP SERPL-CCNC: 39 U/L (ref 34–104)
ALT SERPL W P-5'-P-CCNC: 20 U/L (ref 7–52)
ANION GAP SERPL CALCULATED.3IONS-SCNC: 9 MMOL/L (ref 4–13)
AST SERPL W P-5'-P-CCNC: 23 U/L (ref 13–39)
BASOPHILS # BLD AUTO: 0.06 THOUSANDS/ΜL (ref 0–0.1)
BASOPHILS NFR BLD AUTO: 1 % (ref 0–1)
BILIRUB SERPL-MCNC: 0.39 MG/DL (ref 0.2–1)
BUN SERPL-MCNC: 16 MG/DL (ref 5–25)
CALCIUM SERPL-MCNC: 9 MG/DL (ref 8.4–10.2)
CARDIAC TROPONIN I PNL SERPL HS: 7 NG/L
CARDIAC TROPONIN I PNL SERPL HS: 7 NG/L
CHLORIDE SERPL-SCNC: 102 MMOL/L (ref 96–108)
CO2 SERPL-SCNC: 24 MMOL/L (ref 21–32)
CREAT SERPL-MCNC: 1.04 MG/DL (ref 0.6–1.3)
EOSINOPHIL # BLD AUTO: 0.38 THOUSAND/ΜL (ref 0–0.61)
EOSINOPHIL NFR BLD AUTO: 4 % (ref 0–6)
ERYTHROCYTE [DISTWIDTH] IN BLOOD BY AUTOMATED COUNT: 17 % (ref 11.6–15.1)
GFR SERPL CREATININE-BSD FRML MDRD: 56 ML/MIN/1.73SQ M
GLUCOSE SERPL-MCNC: 112 MG/DL (ref 65–140)
HCT VFR BLD AUTO: 36.9 % (ref 34.8–46.1)
HGB BLD-MCNC: 11.4 G/DL (ref 11.5–15.4)
IMM GRANULOCYTES # BLD AUTO: 0.02 THOUSAND/UL (ref 0–0.2)
IMM GRANULOCYTES NFR BLD AUTO: 0 % (ref 0–2)
LYMPHOCYTES # BLD AUTO: 2.58 THOUSANDS/ΜL (ref 0.6–4.47)
LYMPHOCYTES NFR BLD AUTO: 28 % (ref 14–44)
MAGNESIUM SERPL-MCNC: 2.1 MG/DL (ref 1.9–2.7)
MCH RBC QN AUTO: 26.5 PG (ref 26.8–34.3)
MCHC RBC AUTO-ENTMCNC: 30.9 G/DL (ref 31.4–37.4)
MCV RBC AUTO: 86 FL (ref 82–98)
MONOCYTES # BLD AUTO: 0.77 THOUSAND/ΜL (ref 0.17–1.22)
MONOCYTES NFR BLD AUTO: 9 % (ref 4–12)
NEUTROPHILS # BLD AUTO: 5.3 THOUSANDS/ΜL (ref 1.85–7.62)
NEUTS SEG NFR BLD AUTO: 58 % (ref 43–75)
NRBC BLD AUTO-RTO: 0 /100 WBCS
PLATELET # BLD AUTO: 325 THOUSANDS/UL (ref 149–390)
PMV BLD AUTO: 9.3 FL (ref 8.9–12.7)
POTASSIUM SERPL-SCNC: 4.3 MMOL/L (ref 3.5–5.3)
PROT SERPL-MCNC: 7.1 G/DL (ref 6.4–8.4)
RBC # BLD AUTO: 4.3 MILLION/UL (ref 3.81–5.12)
SODIUM SERPL-SCNC: 135 MMOL/L (ref 135–147)
WBC # BLD AUTO: 9.11 THOUSAND/UL (ref 4.31–10.16)

## 2024-10-28 PROCEDURE — 71045 X-RAY EXAM CHEST 1 VIEW: CPT

## 2024-10-28 PROCEDURE — 36415 COLL VENOUS BLD VENIPUNCTURE: CPT | Performed by: EMERGENCY MEDICINE

## 2024-10-28 PROCEDURE — 84484 ASSAY OF TROPONIN QUANT: CPT | Performed by: EMERGENCY MEDICINE

## 2024-10-28 PROCEDURE — 99285 EMERGENCY DEPT VISIT HI MDM: CPT | Performed by: EMERGENCY MEDICINE

## 2024-10-28 PROCEDURE — 85025 COMPLETE CBC W/AUTO DIFF WBC: CPT | Performed by: EMERGENCY MEDICINE

## 2024-10-28 PROCEDURE — 99285 EMERGENCY DEPT VISIT HI MDM: CPT

## 2024-10-28 PROCEDURE — 93005 ELECTROCARDIOGRAM TRACING: CPT

## 2024-10-28 PROCEDURE — 80053 COMPREHEN METABOLIC PANEL: CPT | Performed by: EMERGENCY MEDICINE

## 2024-10-28 PROCEDURE — 83735 ASSAY OF MAGNESIUM: CPT | Performed by: EMERGENCY MEDICINE

## 2024-10-28 NOTE — ED PROVIDER NOTES
Time reflects when diagnosis was documented in both MDM as applicable and the Disposition within this note       Time User Action Codes Description Comment    10/28/2024  7:37 PM Skip Castro Add [R06.00] Dyspnea           ED Disposition       ED Disposition   Discharge    Condition   Stable    Date/Time   Mon Oct 28, 2024  7:37 PM    Comment   Vesna Langston discharge to home/self care.                   Assessment & Plan       Medical Decision Making  Patient presented to the emergency department and a MSE was performed. The patient was evaluated for complaint related to acute shortness of breath.  Patient is potentially at risk for, but not limited to, acute coronary syndrome, arrhythmia, myocardial infarction, pulmonary embolism, pneumothorax, infectious process of the lungs such as pneumonia, reactive airway disease, asthma, COPD exacerbation, cardiomyopathy, congestive heart failure or viral syndrome.  Several of these diagnoses have been evaluated and ruled out by history and physical.  As needed, patient will be further evaluated with laboratory and imaging studies.  Higher level diagnostics, such as CT imaging or ultrasound, may also be required.  Please see work-up portion of the note for further evaluation of patient's risk.  Socioeconomic factors were also considered as part of the decision-making process.  Unless otherwise stated in the chart or patient is admitted as elsewhere documented, any previously prescribed medications will be maintained.     Problems Addressed:  Dyspnea: chronic illness or injury with exacerbation, progression, or side effects of treatment     Details: Symptomatology lasted for approximately 30-60 seconds and spontaneously resolved.    Amount and/or Complexity of Data Reviewed  Labs: ordered. Decision-making details documented in ED Course.  Radiology: ordered and independent interpretation performed. Decision-making details documented in ED Course.        ED Course as of  10/28/24 2041   Mon Oct 28, 2024   1740 Monitor interrogation from [facility] reveals no evidence of arrhythmia or other abnormality.   1827 hs TnI 0hr: 7   1936 Second troponin is negative for acute process.  Patient will be discharged.       Medications - No data to display    ED Risk Strat Scores   HEART Risk Score      Flowsheet Row Most Recent Value   Heart Score Risk Calculator    History 0 Filed at: 10/28/2024 1938   ECG 1 Filed at: 10/28/2024 1938   Age 2 Filed at: 10/28/2024 1938   Risk Factors 2 Filed at: 10/28/2024 1938   Troponin 0 Filed at: 10/28/2024 1938   HEART Score 5 Filed at: 10/28/2024 1938                               SBIRT 22yo+      Flowsheet Row Most Recent Value   Initial Alcohol Screen: US AUDIT-C     1. How often do you have a drink containing alcohol? 0 Filed at: 10/28/2024 1859   2. How many drinks containing alcohol do you have on a typical day you are drinking?  0 Filed at: 10/28/2024 1859   3b. FEMALE Any Age, or MALE 65+: How often do you have 4 or more drinks on one occassion? 0 Filed at: 10/28/2024 1859   Audit-C Score 0 Filed at: 10/28/2024 1859   CHRIS: How many times in the past year have you...    Used an illegal drug or used a prescription medication for non-medical reasons? Never Filed at: 10/28/2024 1859                            History of Present Illness       Chief Complaint   Patient presents with    Shortness of Breath     Reports she was grocery shopping, developed sob chest pain dizziness and became diaphoretic.Symptom onset at 1600 with reported BP of 189/181. States her leg became heavy and she felt shaky. States symptoms similar to prior MI.          Past Medical History:   Diagnosis Date    Acute respiratory insufficiency 03/22/2024    Allergic     Chronic HFrEF (heart failure with reduced ejection fraction) (HCC)     Coronary artery disease     COVID-19 virus infection 11/28/2022    Diabetes mellitus (HCC)     Disease of thyroid gland 4/09/2024    Heart disease  3/24    Hyperlipidemia     Hypoglycemia     ICD (implantable cardioverter-defibrillator) in place     Ischemic cardiomyopathy     Lactic acidosis 2024    Myocardial infarction (HCC) 3/22/2024    Otitis media 1966    Seasonal allergies     ST elevation myocardial infarction involving left anterior descending (LAD) coronary artery (HCC) 2024    Tobacco abuse     Visual impairment 1967      Past Surgical History:   Procedure Laterality Date    ADENOIDECTOMY      APPENDECTOMY      APPENDECTOMY LAPAROSCOPIC N/A 2024    Procedure: APPENDECTOMY LAPAROSCOPIC;  Surgeon: Lauryn Ullrich, DO;  Location: [facility];  Service: General    BACK SURGERY      BREAST EXCISIONAL BIOPSY Right 2000    cyst removed- benign    CARDIAC CATHETERIZATION N/A 2024    Procedure: Cardiac pci;  Surgeon: Gabriel Myers MD;  [facility];  Service: Cardiology    CARDIAC CATHETERIZATION N/A 2024    Procedure: Cardiac Coronary Angiogram;  Surgeon: Gabriel Myers MD;  [facility];  Service: Cardiology    CARDIAC CATHETERIZATION N/A 2024    Procedure: Cardiac PCI AMI;  Surgeon: Gabriel Myers MD;  [facility];  Service: Cardiology    CARDIAC CATHETERIZATION N/A 2024    Procedure: Cardiac IVUS;  Surgeon: Gabriel Myers MD;  [facility];  Service: Cardiology    CARDIAC DEFIBRILLATOR PLACEMENT      CARDIAC ELECTROPHYSIOLOGY PROCEDURE N/A 2024    Procedure: Cardiac icd implant;  Surgeon: Jeffy Lama MD;  [facility];  Service: Cardiology     SECTION      COLONOSCOPY      ECTOPIC PREGNANCY SURGERY      INSERT / REPLACE / REMOVE PACEMAKER      MASS EXCISION Left 10/18/2024    Procedure: EXCISION BIOPSY TISSUE LESION/MASS UPPER EXTREMITY - Left index finger;  Surgeon: Leandro Campos MD;  Location: [facility];  Service: Orthopedics    SEPTOPLASTY      SPINE SURGERY  23    TONSILLECTOMY        Family History  "  Adopted: Yes   Problem Relation Age of Onset    Depression Mother     Heart disease Father     OCD Daughter       Social History     Tobacco Use    Smoking status: Former     Current packs/day: 0.00     Average packs/day: 1 pack/day for 24.2 years (24.2 ttl pk-yrs)     Types: Cigarettes     Start date: 2000     Quit date: 3/22/2024     Years since quittin.6     Passive exposure: Never    Smokeless tobacco: Never    Tobacco comments:     Smoked 0.5-1 ppd; quit in 2024.    Vaping Use    Vaping status: Never Used   Substance Use Topics    Alcohol use: Not Currently     Alcohol/week: 1.0 standard drink of alcohol     Types: 1 Shots of liquor per week     Comment: Every 2or 3months    Drug use: Never      E-Cigarette/Vaping    E-Cigarette Use Never User       E-Cigarette/Vaping Substances    Nicotine No     THC No     CBD No     Flavoring No     Other No     Unknown No       I have reviewed and agree with the history as documented.     Patient is a 64-year-old female with a history of coronary artery disease, reduced ejection fraction to 40 to 45% as of an echocardiogram performed on 10/8/2024 who presents emergency room reporting that she had an episode where she felt as if she was suddenly \"sweaty\" short of breath, dizzy and weak with lightheadedness.  She reported that her legs felt as if they were \"500 pounds each.\"  She had no chest pain or pressure.  Patient has a significant past medical history as documented below.  She also has a pacemaker and an implantable defibrillator.  She is followed by heart failure clinic.  She contacted them today after the feelings that lasted for about 60 seconds and resolved spontaneously.  She was advised to come to emergency room for further evaluation.      History provided by:  Patient  Shortness of Breath  Associated symptoms: diaphoresis    Associated symptoms: no chest pain and no vomiting        Review of Systems   Constitutional:  Positive for diaphoresis. "   HENT: Negative.     Respiratory:  Positive for shortness of breath.    Cardiovascular:  Negative for chest pain and palpitations.   Gastrointestinal: Negative.  Negative for diarrhea, nausea and vomiting.   Neurological:  Positive for dizziness, weakness and light-headedness.   Psychiatric/Behavioral:  The patient is nervous/anxious.            Objective       ED Triage Vitals   Temperature Pulse Blood Pressure Respirations SpO2 Patient Position - Orthostatic VS   10/28/24 1703 10/28/24 1705 10/28/24 1705 10/28/24 1705 10/28/24 1705 10/28/24 1705   98.2 °F (36.8 °C) 62 147/67 20 99 % Lying      Temp Source Heart Rate Source BP Location FiO2 (%) Pain Score    10/28/24 1703 10/28/24 1705 10/28/24 1705 -- 10/28/24 1705    Temporal Monitor Right arm  3      Vitals      Date and Time Temp Pulse SpO2 Resp BP Pain Score FACES Pain Rating User   10/28/24 1943 -- 66 97 % 18 116/56 -- -- AR   10/28/24 1919 -- -- -- -- -- No Pain -- AR   10/28/24 1846 -- 62 98 % 16 119/61 -- -- BH   10/28/24 1705 -- 62 99 % 20 147/67 3 -- SBE   10/28/24 1703 98.2 °F (36.8 °C) -- -- -- -- -- -- KK            Physical Exam  Vitals and nursing note reviewed.   Constitutional:       General: She is not in acute distress.     Appearance: She is normal weight. She is not ill-appearing or toxic-appearing.   HENT:      Head: Normocephalic and atraumatic.      Right Ear: External ear normal.      Left Ear: External ear normal.      Nose: Nose normal.      Mouth/Throat:      Mouth: Mucous membranes are moist.   Eyes:      Pupils: Pupils are equal, round, and reactive to light.   Cardiovascular:      Rate and Rhythm: Normal rate.   Pulmonary:      Effort: Pulmonary effort is normal. No respiratory distress.      Breath sounds: No decreased breath sounds, wheezing, rhonchi or rales.   Abdominal:      General: Abdomen is flat. There is no distension.   Musculoskeletal:         General: No signs of injury.   Skin:     Coloration: Skin is not pale.    Neurological:      General: No focal deficit present.      Mental Status: She is alert.   Psychiatric:         Mood and Affect: Mood normal.         Thought Content: Thought content normal.         Judgment: Judgment normal.         Results Reviewed       Procedure Component Value Units Date/Time    HS Troponin I 4hr [[id number]]     Lab Status: No result Specimen: Blood     HS Troponin I 2hr [[id number]]  (Normal) Collected: 10/28/24 1847    Lab Status: Final result Specimen: Blood from Hand, Right Updated: 10/28/24 1919     hs TnI 2hr 7 ng/L      Delta 2hr hsTnI 0 ng/L     HS Troponin 0hr (reflex protocol) [[id number]]  (Normal) Collected: 10/28/24 1728    Lab Status: Final result Specimen: Blood from Hand, Right Updated: 10/28/24 1806     hs TnI 0hr 7 ng/L     Comprehensive metabolic panel [[id number]] Collected: 10/28/24 1728    Lab Status: Final result Specimen: Blood from Hand, Left Updated: 10/28/24 1752     Sodium 135 mmol/L      Potassium 4.3 mmol/L      Chloride 102 mmol/L      CO2 24 mmol/L      ANION GAP 9 mmol/L      BUN 16 mg/dL      Creatinine 1.04 mg/dL      Glucose 112 mg/dL      Calcium 9.0 mg/dL      AST 23 U/L      ALT 20 U/L      Alkaline Phosphatase 39 U/L      Total Protein 7.1 g/dL      Albumin 4.2 g/dL      Total Bilirubin 0.39 mg/dL      eGFR 56 ml/min/1.73sq m     Narrative:      National Kid[employer] guidelines for Chronic Kidney Disease (CKD):     Stage 1 with normal or high GFR (GFR > 90 mL/min/1.73 square meters)    Stage 2 Mild CKD (GFR = 60-89 mL/min/1.73 square meters)    Stage 3A Moderate CKD (GFR = 45-59 mL/min/1.73 square meters)    Stage 3B Moderate CKD (GFR = 30-44 mL/min/1.73 square meters)    Stage 4 Severe CKD (GFR = 15-29 mL/min/1.73 square meters)    Stage 5 End Stage CKD (GFR <15 mL/min/1.73 square meters)  Note: GFR calculation is accurate only with a steady state creatinine    Magnesium [[id number]]  (Normal) Collected: 10/28/24 1728    Lab Status: Final  result Specimen: Blood from Hand, Left Updated: 10/28/24 1752     Magnesium 2.1 mg/dL     CBC and differential [[id number]]  (Abnormal) Collected: 10/28/24 1728    Lab Status: Final result Specimen: Blood from Hand, Left Updated: 10/28/24 1735     WBC 9.11 Thousand/uL      RBC 4.30 Million/uL      Hemoglobin 11.4 g/dL      Hematocrit 36.9 %      MCV 86 fL      MCH 26.5 pg      MCHC 30.9 g/dL      RDW 17.0 %      MPV 9.3 fL      Platelets 325 Thousands/uL      nRBC 0 /100 WBCs      Segmented % 58 %      Immature Grans % 0 %      Lymphocytes % 28 %      Monocytes % 9 %      Eosinophils Relative 4 %      Basophils Relative 1 %      Absolute Neutrophils 5.30 Thousands/µL      Absolute Immature Grans 0.02 Thousand/uL      Absolute Lymphocytes 2.58 Thousands/µL      Absolute Monocytes 0.77 Thousand/µL      Eosinophils Absolute 0.38 Thousand/µL      Basophils Absolute 0.06 Thousands/µL             XR chest 1 view portable   ED Interpretation by Skip Castro DO (10/28 [id number])   Interstitial changes with improvement from September 9, 2024          ECG 12 Lead Documentation Only    Date/Time: 10/28/2024 5:41 PM    Performed by: Skip Castro DO  Authorized by: Skip Castro DO    ECG reviewed by me, the ED Provider: yes    Patient location:  ED  Previous ECG:     Previous ECG:  Compared to current    Comparison ECG info:  No significant change from EKG performed in August or September of this year.  Interpretation:     Interpretation: normal    Rate:     ECG rate assessment: normal    Rhythm:     Rhythm: sinus rhythm    Ectopy:     Ectopy: none    QRS:     QRS axis:  Normal  Conduction:     Conduction: normal    ST segments:     ST segments:  Non-specific  T waves:     T waves: non-specific    Comments:      This EKG appears to be nonischemic.  Pewter interpretation appears to be incorrect.      ED Medication and Procedure Management   Prior to Admission Medications   Prescriptions Last Dose Informant Patient Reported?  Taking?   Durolane 60 MG/3ML injection  Self Yes No   Empagliflozin (Jardiance) 10 MG TABS tablet  Self No No   Sig: Take 1 tablet (10 mg total) by mouth every morning   HYDROcodone-acetaminophen (NORCO) 5-325 mg per tablet  Self No No   Sig: Take 1 tablet by mouth every 6 (six) hours as needed for pain for up to 10 doses Max Daily Amount: 4 tablets   acetaminophen (TYLENOL) 500 mg tablet   No No   Sig: Take 2 tablets (1,000 mg total) by mouth every 8 (eight) hours as needed for mild pain   albuterol (ProAir HFA) 90 mcg/act inhaler  Self No No   Sig: Inhale 2 puffs every 6 (six) hours as needed for wheezing   amiodarone 200 mg tablet  Self No No   Sig: Take 1 tablet (200 mg total) by mouth daily   apixaban (ELIQUIS) 5 mg   No No   Sig: Take 1 tablet (5 mg total) by mouth 2 (two) times a day   atorvastatin (LIPITOR) 80 mg tablet  Self No No   Sig: TAKE 1 TABLET BY MOUTH EVERY EVENING   clopidogrel (PLAVIX) 75 mg tablet  Self No No   Sig: Take 1 tablet (75 mg total) by mouth daily   diphenhydrAMINE (BENADRYL) 25 mg capsule  Self Yes No   Sig: Take 25 mg by mouth every 6 (six) hours as needed for itching   furosemide (LASIX) 20 mg tablet   No No   Sig: Take 1 tablet (20 mg total) by mouth daily Alternating every other day with 1 tablet daily   gabapentin (Neurontin) 100 mg capsule  Self No No   Sig: Take 1 capsule (100 mg total) by mouth 2 (two) times a day   glimepiride (AMARYL) 1 mg tablet  Self No No   Sig: Take 1 tablet (1 mg total) by mouth daily with breakfast   isosorbide mononitrate (IMDUR) 30 mg 24 hr tablet  Self No No   Sig: Take 1 tablet (30 mg total) by mouth daily   levothyroxine (Synthroid) 75 mcg tablet  Self No No   Sig: Take 1 tablet (75 mcg total) by mouth daily   Patient taking differently: Take 75 mcg by mouth daily Takes at lunch time   lidocaine (LIDODERM) 5 %   No No   Sig: Apply 1 patch topically over 12 hours daily Remove & Discard patch within 12 hours or as directed by MD   meclizine  (ANTIVERT) 25 mg tablet  Self No No   Sig: Take 1 tablet (25 mg total) by mouth every 8 (eight) hours as needed for dizziness   metoprolol succinate (TOPROL-XL) 25 mg 24 hr tablet  Self No No   Sig: Take 2 tablets (50 mg total) by mouth daily at bedtime   pantoprazole (PROTONIX) 40 mg tablet  Self No No   Sig: TAKE 1 TABLET BY MOUTH 2 TIMES A DAY BEFORE MEALS.   sacubitril-valsartan (Entresto) 49-51 MG TABS   No No   Sig: Take 1 tablet by mouth 2 (two) times a day   spironolactone (ALDACTONE) 25 mg tablet  Self No No   Sig: Take 1 tablet (25 mg total) by mouth daily   traMADol (Ultram) 50 mg tablet   No No   Sig: Take 1 tablet (50 mg total) by mouth every 6 (six) hours as needed for moderate pain for up to 10 days      Facility-Administered Medications: None     Discharge Medication List as of 10/28/2024  7:40 PM        CONTINUE these medications which have NOT CHANGED    Details   acetaminophen (TYLENOL) 500 mg tablet Take 2 tablets (1,000 mg total) by mouth every 8 (eight) hours as needed for mild pain, Starting Fri 10/18/2024, Normal      albuterol (ProAir HFA) 90 mcg/act inhaler Inhale 2 puffs every 6 (six) hours as needed for wheezing, Starting Wed 5/1/2024, Normal      amiodarone 200 mg tablet Take 1 tablet (200 mg total) by mouth daily, Starting Tue 9/24/2024, Until Mon 12/23/2024, Normal      apixaban (ELIQUIS) 5 mg Take 1 tablet (5 mg total) by mouth 2 (two) times a day, Starting Wed 10/23/2024, Until Mon 4/21/2025, Normal      atorvastatin (LIPITOR) 80 mg tablet TAKE 1 TABLET BY MOUTH EVERY EVENING, Starting Thu 8/1/2024, Normal      clopidogrel (PLAVIX) 75 mg tablet Take 1 tablet (75 mg total) by mouth daily, Starting Fri 7/5/2024, Until Wed 1/1/2025, Normal      diphenhydrAMINE (BENADRYL) 25 mg capsule Take 25 mg by mouth every 6 (six) hours as needed for itching, Historical Med      Durolane 60 MG/3ML injection Historical Med      Empagliflozin (Jardiance) 10 MG TABS tablet Take 1 tablet (10 mg total)  by mouth every morning, Starting Mon 9/23/2024, Until Tue 3/17/2026, Normal      furosemide (LASIX) 20 mg tablet Take 1 tablet (20 mg total) by mouth daily Alternating every other day with 1 tablet daily, Starting Mon 10/14/2024, Until Tue 4/7/2026, Normal      gabapentin (Neurontin) 100 mg capsule Take 1 capsule (100 mg total) by mouth 2 (two) times a day, Starting Thu 3/28/2024, No Print      glimepiride (AMARYL) 1 mg tablet Take 1 tablet (1 mg total) by mouth daily with breakfast, Starting Mon 10/7/2024, Until Thu 10/2/2025, Normal      HYDROcodone-acetaminophen (NORCO) 5-325 mg per tablet Take 1 tablet by mouth every 6 (six) hours as needed for pain for up to 10 doses Max Daily Amount: 4 tablets, Starting Sun 5/12/2024, Normal      isosorbide mononitrate (IMDUR) 30 mg 24 hr tablet Take 1 tablet (30 mg total) by mouth daily, Starting Mon 9/16/2024, Until Tue 3/10/2026, Normal      levothyroxine (Synthroid) 75 mcg tablet Take 1 tablet (75 mcg total) by mouth daily, Starting Thu 8/15/2024, Normal      lidocaine (LIDODERM) 5 % Apply 1 patch topically over 12 hours daily Remove & Discard patch within 12 hours or as directed by MD, Starting Mon 10/21/2024, Until Sun 1/19/2025, Normal      meclizine (ANTIVERT) 25 mg tablet Take 1 tablet (25 mg total) by mouth every 8 (eight) hours as needed for dizziness, Starting Sat 9/11/2021, Normal      metoprolol succinate (TOPROL-XL) 25 mg 24 hr tablet Take 2 tablets (50 mg total) by mouth daily at bedtime, Starting Thu 5/30/2024, Until Fri 11/21/2025, Normal      pantoprazole (PROTONIX) 40 mg tablet TAKE 1 TABLET BY MOUTH 2 TIMES A DAY BEFORE MEALS., Starting Sun 8/4/2024, Normal      sacubitril-valsartan (Entresto) 49-51 MG TABS Take 1 tablet by mouth 2 (two) times a day, Starting Mon 10/21/2024, Until Tue 4/14/2026, Normal      spironolactone (ALDACTONE) 25 mg tablet Take 1 tablet (25 mg total) by mouth daily, Starting Thu 5/30/2024, Until Fri 11/21/2025, Normal      traMADol  (Ultram) 50 mg tablet Take 1 tablet (50 mg total) by mouth every 6 (six) hours as needed for moderate pain for up to 10 days, Starting Fri 10/18/2024, Until Mon 10/28/2024 at 2359, Normal             ED SEPSIS DOCUMENTATION   Time reflects when diagnosis was documented in both MDM as applicable and the Disposition within this note       Time User Action Codes Description Comment    10/28/2024  7:37 PM Skip Castro Add [R06.00] Dyspnea                  Skip Castro DO  10/28/24 2041

## 2024-10-28 NOTE — TELEPHONE ENCOUNTER
"Chief Complaint: incident while grocery shopping    History of Present Illness (HPI): Pt called from her car in the parking lot stating she was shopping at the grocery store and felt SOB, diaphoresis and legs felt heavy. Pt still felt very shaky, but SOB resolved. Pt concerned because last time this happened the was having an MI 3/2024.   Pt denies any weight gain, or edema  Pt ate breakfast and lunch today  Pt decided to report to [facility] in [place] to be evaluated rather than an urgent care      VS/Weight: unable to obtain    Pain: No    Risk Factors: Heart Failure    Recent Testing: Cardiac Catheterization    Medicine: Eliquis, Entresto 49-51 mg, lasix 20 mg daily, amiodarone 200 mg daily, jardiance 10 mg daily, Imdur 30 mg daily lipitor 80 mg, plavix 75 mg, Toprol 50 mg, spironolactone daily    Upcoming Office Visit: Yes, Dr Bell 2/14/25    Last Office Visit: 10/14/24    Answer Assessment - Initial Assessment Questions  1. RESPIRATORY STATUS: \"Describe your breathing?\" (e.g., wheezing, shortness of breath, unable to speak, severe coughing)       Pt had an episode of SOB while grocery shopping  2. ONSET: \"When did this breathing problem begin?\"       today  3. PATTERN \"Does the difficult breathing come and go, or has it been constant since it started?\"       Comes and goes  4. SEVERITY: \"How bad is your breathing?\" (e.g., mild, moderate, severe)       Mild currently  5. RECURRENT SYMPTOM: \"Have you had difficulty breathing before?\" If Yes, ask: \"When was the last time?\" and \"What happened that time?\"       Yes prior to MI  6. CARDIAC HISTORY: \"Do you have any history of heart disease?\" (e.g., heart attack, angina, bypass surgery, angioplasty)       yes  7. LUNG HISTORY: \"Do you have any history of lung disease?\"  (e.g., pulmonary embolus, asthma, emphysema)     unknown  8. CAUSE: \"What do you think is causing the breathing problem?\"       unknown  9. OTHER SYMPTOMS: \"Do you have any other symptoms?\" " "(e.g., chest pain, cough, dizziness, fever, runny nose)      Sweating, heavy legs  10. O2 SATURATION MONITOR:  \"Do you use an oxygen saturation monitor (pulse oximeter) at home?\" If Yes, ask: \"What is your reading (oxygen level) today?\" \"What is your usual oxygen saturation reading?\" (e.g., 95%)        unknown    Protocols used: Breathing Difficulty-Adult-[place]    "

## 2024-10-28 NOTE — DISCHARGE INSTRUCTIONS
Continue all your normally prescribed medications as advised by your cardiologist.  Return with any worsening.  There were no acute findings this evening that would indicate an acute cardiac event.    Thank you for choosing the emergency department at [facility]. We appreciated the opportunity and privilege to address your healthcare needs. We remain available to you should you require additional evaluation or assistance. We value your feedback and would appreciate the opportunity to address anything you identified as an opportunity to improve or where we excelled. If there are colleagues who deserve special recognition, please let us know! We hope you are feeling better soon!    Please also note that sometimes there are subtle abnormalities in your lab values that you may observe when you access your record online.  These are frequently not worrisome and if they are of concern we will have discussed them with you.  However, we always encourage that you discuss any concerns you may have or observe on your record with your primary care provider.   Please also note that while your visit documentation was reviewed prior to completion, voice transcription will occasionally recognize words or grammar differently than what was spoken.

## 2024-10-29 ENCOUNTER — TELEPHONE (OUTPATIENT)
Age: 64
End: 2024-10-29

## 2024-10-29 ENCOUNTER — OFFICE VISIT (OUTPATIENT)
Dept: OBGYN CLINIC | Facility: CLINIC | Age: 64
End: 2024-10-29

## 2024-10-29 VITALS
SYSTOLIC BLOOD PRESSURE: 108 MMHG | DIASTOLIC BLOOD PRESSURE: 58 MMHG | WEIGHT: 220 LBS | HEIGHT: 68 IN | OXYGEN SATURATION: 98 % | TEMPERATURE: 98.4 F | BODY MASS INDEX: 33.34 KG/M2 | HEART RATE: 48 BPM

## 2024-10-29 DIAGNOSIS — D48.19 TENOSYNOVIAL GIANT CELL TUMOR: Primary | ICD-10-CM

## 2024-10-29 LAB
ATRIAL RATE: 60 BPM
P AXIS: 41 DEGREES
PR INTERVAL: 182 MS
QRS AXIS: 61 DEGREES
QRSD INTERVAL: 80 MS
QT INTERVAL: 462 MS
QTC INTERVAL: 462 MS
T WAVE AXIS: 89 DEGREES
VENTRICULAR RATE: 60 BPM

## 2024-10-29 PROCEDURE — 99024 POSTOP FOLLOW-UP VISIT: CPT | Performed by: STUDENT IN AN ORGANIZED HEALTH CARE EDUCATION/TRAINING PROGRAM

## 2024-10-29 NOTE — TELEPHONE ENCOUNTER
Called patient and advised her that Dr. Bell is out this week. Dr. Aguilar looked at her EKG stated it is stable. Dr. Bell will discuss in more detail at next office visit 11/05/2024.

## 2024-10-29 NOTE — TELEPHONE ENCOUNTER
Pt was seen in the ED as instructed yesterday. Received call from pt stating she was discharged after finding out her Troponin's were normal. Pt stated however she was looking at her MyChart and saw new results for an EKG that was done yesterday and is very concerned and would like Dr. Bell to review it.    Advised pt will send a message to the provider to review and advise.

## 2024-10-29 NOTE — PROGRESS NOTES
"Assessment:   S/P EXCISION BIOPSY TISSUE LESION/MASS UPPER EXTREMITY - Left index finger - Left on 10/18/2024. The patient has done well since surgery.  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  I had called the previous patient previously to discuss this.  We discussed the merits are locally aggressive can affect the bone, ligaments and tendons.  Additionally they have a high recurrence rate.  Discussed with the patient and that she should monitor the mass in the future has a recurrence would likely require a resection.  This time the patient does very get admitted to surgery and is very happy with the hand.      Plan:   Scar massage  Work note provided  Tylenol and motrin for pain    Follow Up:  4 weeks      CHIEF COMPLAINT:  Post op follow up      SUBJECTIVE:  Vesna Langston is a 64 y.o. female who presents for follow up after EXCISION BIOPSY TISSUE LESION/MASS UPPER EXTREMITY - Left index finger - Left on 10/18/2024. Pain is well controlled on the current regimen. No issues with the post operative dressing. No fever or chills. No new numbness or tingling. Today patient has No Complaints.       PHYSICAL EXAMINATION:  Vital signs: /58 (BP Location: Right arm, Patient Position: Sitting, Cuff Size: Large)   Pulse (!) 48   Temp 98.4 °F (36.9 °C) (Temporal)   Ht 5' 8\" (1.727 m)   Wt 99.8 kg (220 lb)   SpO2 98%   BMI 33.45 kg/m²   General: well developed and well nourished, alert, oriented times 3, and appears comfortable  Psychiatric: Normal    MUSCULOSKELETAL EXAMINATION:  Incision: Clean, dry, intact  Range of Motion: As expected  Neurovascular status: Neuro intact, good cap refill. There is a 5 mm by 5 mm area of decreased sensation at the dorsal edge of the distal incision, otherwise full sensation  Activity Restrictions: No restrictions        STUDIES REVIEWED:  No Studies to review        "

## 2024-10-29 NOTE — LETTER
October 29, 2024     Patient: Vesna Langston  YOB: 1960  Date of Visit: 10/29/2024      To Whom it May Concern:    Vesna Langston is under my professional care. Vesna was seen in my office on 10/29/2024. Vesna may return to work on 10/31/24 however she must abide by the precautions set forth in relation to her back and spine. From a hand perspective, she should keep the left finger clean and dry for the next week .    If you have any questions or concerns, please don't hesitate to call.         Sincerely,          Leandro Campos MD        CC: No Recipients

## 2024-10-30 ENCOUNTER — OFFICE VISIT (OUTPATIENT)
Dept: OBGYN CLINIC | Facility: CLINIC | Age: 64
End: 2024-10-30
Payer: COMMERCIAL

## 2024-10-30 VITALS
OXYGEN SATURATION: 98 % | SYSTOLIC BLOOD PRESSURE: 110 MMHG | BODY MASS INDEX: 33.34 KG/M2 | WEIGHT: 220 LBS | DIASTOLIC BLOOD PRESSURE: 60 MMHG | TEMPERATURE: 97.6 F | HEIGHT: 68 IN | HEART RATE: 60 BPM

## 2024-10-30 DIAGNOSIS — M17.11 PRIMARY OSTEOARTHRITIS OF RIGHT KNEE: ICD-10-CM

## 2024-10-30 DIAGNOSIS — M25.561 CHRONIC PAIN OF RIGHT KNEE: Primary | ICD-10-CM

## 2024-10-30 DIAGNOSIS — G89.29 CHRONIC PAIN OF RIGHT KNEE: Primary | ICD-10-CM

## 2024-10-30 DIAGNOSIS — Z87.828 HISTORY OF TORN MENISCUS OF RIGHT KNEE: ICD-10-CM

## 2024-10-30 PROCEDURE — 99213 OFFICE O/P EST LOW 20 MIN: CPT | Performed by: STUDENT IN AN ORGANIZED HEALTH CARE EDUCATION/TRAINING PROGRAM

## 2024-10-30 NOTE — LETTER
October 30, 2024     Patient: Vesna Langston  YOB: 1960  Date of Visit: 10/30/2024      To Whom it May Concern:    Vesna Langston is under my professional care. Vesna was seen in my office on 10/30/2024. In regards to her right knee, patient can return to work on 10/31/24 but must abide by precautions set forth in relation to her back and spine. Follow up as needed.    If you have any questions or concerns, please don't hesitate to call.         Sincerely,          Timmy Sanchez MD        CC: No Recipients

## 2024-10-30 NOTE — PROGRESS NOTES
" Nutrition Assessment Form    Patient Name: Vesna Langston    YOB: 1960    Sex: Female     Assessment Date: 10/30/2024  Start Time: 1:04 PM Stop Time: 1:32 PM Total Minutes: 28 min       Data:  Present at session: self   Parent/Patient Concerns/reason for visit: \"I'm gaining weight.\"    Medical Dx/Reason for Referral: Ischemic cardiomyopathy   Past Medical History:   Diagnosis Date    Acute respiratory insufficiency 03/22/2024    Allergic     Chronic HFrEF (heart failure with reduced ejection fraction) ([facility])     Coronary artery disease     COVID-19 virus infection 11/28/2022    Diabetes mellitus ([facility])     Disease of thyroid gland 4/09/2024    Heart disease 3/24    Hyperlipidemia     Hypoglycemia     ICD (implantable cardioverter-defibrillator) in place     Ischemic cardiomyopathy     Lactic acidosis 03/22/2024    Myocardial infarction ([facility]) 3/22/2024    Otitis media 1966    Seasonal allergies     ST elevation myocardial infarction involving left anterior descending (LAD) coronary artery ([facility]) 03/22/2024    Tobacco abuse     Visual impairment 1967    Back surgery in March  MI in March 2024  Only able to work 4 h per day at work, on workmans comp due to back surgery, not able to lift more than 1 gal milk    ECHO scheduled for next week to assess HF   Current Outpatient Medications   Medication Sig Dispense Refill    albuterol (ProAir HFA) 90 mcg/act inhaler Inhale 2 puffs every 6 (six) hours as needed for wheezing 8.5 g 0    apixaban (ELIQUIS) 5 mg Take 1 tablet (5 mg total) by mouth 2 (two) times a day 180 tablet 1    atorvastatin (LIPITOR) 80 mg tablet TAKE 1 TABLET BY MOUTH EVERY EVENING 100 tablet 1    clopidogrel (PLAVIX) 75 mg tablet Take 1 tablet (75 mg total) by mouth daily 30 tablet 5    diphenhydrAMINE (BENADRYL) 25 mg capsule Take 25 mg by mouth every 6 (six) hours as needed for itching      Durolane 60 MG/3ML injection  (Patient not taking: Reported on 10/29/2024)      Empagliflozin (Jardiance) " "10 MG TABS tablet Take 1 tablet (10 mg total) by mouth every morning 90 tablet 1    furosemide (LASIX) 20 mg tablet Take 1 tablet (20 mg total) by mouth daily Alternating every other day with 1 tablet daily 45 tablet 5    gabapentin (Neurontin) 100 mg capsule Take 1 capsule (100 mg total) by mouth 2 (two) times a day (Patient not taking: Reported on 10/29/2024)      glimepiride (AMARYL) 1 mg tablet Take 1 tablet (1 mg total) by mouth daily with breakfast 90 tablet 3    HYDROcodone-acetaminophen (NORCO) 5-325 mg per tablet Take 1 tablet by mouth every 6 (six) hours as needed for pain for up to 10 doses Max Daily Amount: 4 tablets 10 tablet 0    isosorbide mononitrate (IMDUR) 30 mg 24 hr tablet Take 1 tablet (30 mg total) by mouth daily 90 tablet 5    levothyroxine (Synthroid) 75 mcg tablet Take 1 tablet (75 mcg total) by mouth daily (Patient taking differently: Take 75 mcg by mouth daily Takes at lunch time) 90 tablet 3    meclizine (ANTIVERT) 25 mg tablet Take 1 tablet (25 mg total) by mouth every 8 (eight) hours as needed for dizziness 30 tablet 0    metoprolol succinate (TOPROL-XL) 25 mg 24 hr tablet Take 2 tablets (50 mg total) by mouth daily at bedtime 180 tablet 5    pantoprazole (PROTONIX) 40 mg tablet TAKE 1 TABLET BY MOUTH 2 TIMES A DAY BEFORE MEALS. 60 tablet 3    sacubitril-valsartan (Entresto) 49-51 MG TABS Take 1 tablet by mouth 2 (two) times a day 180 tablet 5    spironolactone (ALDACTONE) 25 mg tablet Take 1 tablet (25 mg total) by mouth daily 90 tablet 5     No current facility-administered medications for this visit.     \"My blood sugar is more acceptable now on the amaryl\" Previously in 140-150s now in 110-120s range.    Additional Meds/Supplements: coQ10, stopped MVI due to stomach upset, avoids omega 3 supplements due to seafood allergy    Special Learning Needs/barriers to learning/any new barriers N/a   Height: Ht Readings from Last 5 Encounters:   10/30/24 5' 8\" (1.727 m)   10/29/24 5' 8\" " "(1.727 m)   10/28/24 5' 8\" (1.727 m)   10/17/24 5' 8\" (1.727 m)   10/16/24 5' 8\" (1.727 m)      Weight: Wt Readings from Last 10 Encounters:   10/30/24 99.8 kg (220 lb)   10/29/24 99.8 kg (220 lb)   10/28/24 99.8 kg (220 lb)   10/17/24 99.3 kg (219 lb)   10/16/24 99.8 kg (220 lb)   10/15/24 100 kg (220 lb 12.8 oz)   10/14/24 100 kg (221 lb)   10/14/24 100 kg (221 lb)   10/11/24 98 kg (216 lb)   10/08/24 98 kg (216 lb 0.8 oz)     Estimated body mass index is 33.45 kg/m² as calculated from the following:    Height as of 10/30/24: 5' 8\" (1.727 m).    Weight as of 10/30/24: 99.8 kg (220 lb).   Recent Weight Change: [x]Yes     []No  Amount: Noticed weight gain with increase in amount of medications, ~15lb.       Energy Needs: Atkinson- St. Gray Equation: 1536 - 1849 kcal (mifflin x 1.3 - 1.5 activity - 500 for 1 lb weight loss per week)  173 - 208 g CHO per day (45% kcal from CHO)   Allergies   Allergen Reactions    Fish-Derived Products - Food Allergy Anaphylaxis     Cannot have all seafood products    Shellfish-Derived Products - Food Allergy Anaphylaxis    Azithromycin Hives and Rash    or intolerances Allergy to shellfish/fish causing anaphylaxis    Does not like liver or beets, can't tolerate spicy foods   Social History     Substance and Sexual Activity   Alcohol Use Not Currently    Alcohol/week: 1.0 standard drink of alcohol    Types: 1 Shots of liquor per week    Comment: Every 2or 3months    Very rare, last drink was at Ember 2023   Social History     Tobacco Use   Smoking Status Former    Current packs/day: 0.00    Average packs/day: 1 pack/day for 24.2 years (24.2 ttl pk-yrs)    Types: Cigarettes    Start date: 2000    Quit date: 3/22/2024    Years since quittin.6    Passive exposure: Never   Smokeless Tobacco Never   Tobacco Comments    Smoked 0.5-1 ppd; quit in 2024.     Quit smoking 6 mo ago cold turkey   Who shops? patient and daughter sometimes   Who cooks/cooking methods/Eating out/take " "out habits   patient  Cooking methods: bake/connor/air connor/grill/boil---mostly air fried or baked or broiled or shallow connor eggs in spray oil     Take out: francisco knowles grilled chicken wrap if she does go out    Dining out tries to portion control if she goes out such as on vacations, limited    Exercise: Aquatherapy \"for my back\"     Finished cardiac rehab, joined [facility] to continue with her physical activity regimen. Plans to go there 3x per week.          Other: ie: Sleep habits/ stress level/ work habits household-lives with ?/ food security Did not assess at this visit   Prior Nutritional Counseling? [x]Yes --during hospitalization for heart attack, low sodium, low fat, low cholesterol    []No  When:      Why:         Diet Hx:  Breakfast: Diet: 64 oz fluid restriction, trying to reduce carbs and sodium  Wakes up at 2 AM (works at 4 AM)    Eats cereal \"fills the bowl\" or biscuits with scrambled eggs with peppers/onions/cheese baked in muffin cups as a premade egg sandwich or yogurt with fruit such as regular Greek yogurt or Jason English muffin cinnamon raisin or cranberry with margarine + apples or blackberries or banana    Coffee 8 oz less with zero sugar creamer     Lunch: At 11 AM  Grilled cheese with tomato or ham/cheese sandwich or ham/cheese wrap and fresh fruit tries to get low sodium lunchmeat with rosey or swiss cheese    Uses 647 bread and carb smart tortillas     If on the go and doesn't have time would have [employer]onal[employer] though very rare this happens     Dinner: Meat/veg/whole wheat pasta or brown rice or baked sweet potato or sweet potato fries in air fryer    Or frozen chicken cordon blue with rice and carrots    Or may have cottage cheese with fruit or salad      Snacks: AM - at 6/6:30 such as baked egg with slice of ham  PM - would like to try slim fast shakes for this  HS - fun size snicker bar or 90 kcal popcorners or low fat cheetos, goes to bed at 7 AM     BodyArmor Light, 1 per day using " "this has her potassium supplement     \"I splurged\" and had a diet rootbeer, avoids regular soda    Other Notes/ Initial Assessment:  Pt reports previous low calorie diet in effort to lose weight though was advised by physician to see dietitian for dietary guidance. Pt is knowledgeable of reading foods labels to identify sodium, fat, cholesterol content, unsure of appropriate carb intake for meals.      Updated assessment (Follow up note only):  Pt has gained 13lb from previous appointment 1 mo ago. Pt noticing her legs feel puffy and heavy, believes this is fluid related weight gain. Pt admitting she has been busier lately and selecting convenience foods such as McDonal[employer] and frozen convenience food options. Pt continues to restrict fluid intake to 64 oz per day, mindful of watermelon being part of this though was not counting yogurts which she eats daily. Pt reports her BG levels have been great, this morning was 104.          Nutrition Diagnosis:   Altered Nutrition-Related Laboratory values  related to Kidney, liver, cardiac, endocrine, neurologic, and/or pulmonary dysfunction as evidenced by  Abnormal plasma glucose and/or HgbA1c levels + abnormal TRIG/LDLs       Any change or new dx since previous visit:       Medical Nutrition Therapy Intervention:  [x]Individualized Meal Plan--Emphasized importance of low sodium diet compliance, encouraged home prepared meals as much as possible and continued avoidance of salt shaker. Pt plans to meal prep and freeze extra portions to help with busy nights. Encouraged pt to discuss fluid intake with cardiologist (has appointment next Monday). Reviewed foods that contain fluid which should be included in this such as yogurt. Discussed limiting use of butter, select amada/flax seeds and their oils noting very low intake of omega 3 rich foods especially with pt's seafood allergy.  []Understanding Lab Values   []Basic Pathophysiology of Disease []Food/Medication Interactions " "  [x]Food Diary--Pt reports she has been tracking calories, 1500 kcal per day is her goal. Would encourage continuation of this.  []Exercise   []Lifestyle/Behavior Modification Techniques []Medication, Mechanism of Action   [x]Label Reading: Reviewed high sodium foods > 300 mg should be avoided.  []Self Blood Glucose Monitoring   [x]Weight/BMI Goals:Goal of 0.5-1lb weight loss per week.  []Other -     Handouts previously provided: Portions Booklet, homemade banana nice cream recipe          Comprehension: []Excellent  []Very Good  [x]Good  []Fair   []Poor    Receptivity: []Excellent  []Very Good  [x]Good  []Fair   []Poor    Expected Compliance: []Excellent  []Very Good  [x]Good  []Fair   []Poor        Goals (initial)/ Progress made on previous goals/new goals:  Pt to lose 0.5-1lb per week upon follow up. --not met, extended   2. Pt to obtain A1c < 7% upon next lab reading. --pending updated level   3. Pt to limit snacks to 15 grams carb each upon follow up. --met, extended       No follow-ups on file.  Labs:  CMP  Lab Results   Component Value Date    K 4.3 10/28/2024     10/28/2024    CO2 24 10/28/2024    BUN 16 10/28/2024    CREATININE 1.04 10/28/2024    GLUCOSE 168 (H) 04/07/2024    GLUF 133 (H) 09/10/2024    CALCIUM 9.0 10/28/2024    CORRECTEDCA 9.0 04/07/2024    AST 23 10/28/2024    ALT 20 10/28/2024    ALKPHOS 39 10/28/2024    EGFR 56 10/28/2024       BMP  Lab Results   Component Value Date    GLUCOSE 168 (H) 04/07/2024    CALCIUM 9.0 10/28/2024    K 4.3 10/28/2024    CO2 24 10/28/2024     10/28/2024    BUN 16 10/28/2024    CREATININE 1.04 10/28/2024       Lipids  No results found for: \"CHOL\"  Lab Results   Component Value Date    HDL 57 09/27/2024    HDL 45 (L) 04/22/2024    HDL 50 03/22/2024     Lab Results   Component Value Date    LDLCALC 103 (H) 09/27/2024    LDLCALC 70 04/22/2024    LDLCALC 236 (H) 03/22/2024     Lab Results   Component Value Date    TRIG 168 (H) 09/27/2024    TRIG 137 " "04/22/2024    TRIG 185 (H) 03/22/2024     No results found for: \"CHOLHDL\"    Hemoglobin A1C  Lab Results   Component Value Date    HGBA1C 7.2 (H) 09/27/2024       Fasting Glucose  Lab Results   Component Value Date    GLUF 133 (H) 09/10/2024       Insulin     Thyroid  No results found for: \"TSH\", \"V2KBIKG\", \"G6VMRGD\", \"THYROIDAB\"    Hepatic Function Panel  Lab Results   Component Value Date    ALT 20 10/28/2024    AST 23 10/28/2024    ALKPHOS 39 10/28/2024       Celiac Disease Antibody Panel  No results found for: \"ENDOMYSIAL IGA\", \"GLIADIN IGA\", \"GLIADIN IGG\", \"IGA\", \"TISSUE TRANSGLUT AB\", \"TTG IGA\"   Iron  Lab Results   Component Value Date    IRON 68 04/07/2024    TIBC 307 04/07/2024    FERRITIN 59 04/07/2024            Audrey Peters RD, LDN  [facility]  [address] [place] [id number]    "

## 2024-10-30 NOTE — PROGRESS NOTES
1. Chronic pain of right knee        2. Primary osteoarthritis of right knee        3. History of torn meniscus of right knee              No orders of the defined types were placed in this encounter.       Imaging Studies (I personally reviewed images in PACS and report):      X-ray right knee 7/26/2024: Mild tricompartmental osteophytic changes evidence by marginal osteophytes.  Superior patellar enthesophyte from quadriceps tendon.  Small joint effusion.                IMPRESSION:  Acute on chronic chronic right knee pain  Of note h/o fall on right knee in 2017-she has an MRI report as noted above indicating osteoarthritic changes as well as medial/lateral meniscal tears  Pain have an aggravating initially after starting cardiac rehab due to chronic systolic heart failure.  Patient noting that she is trying to become more active and go on 5K walks.  However her right knee pain is a limiting factor as pain worsens with prolonged walking  Radiographic imaging notes degenerative changes  Differential includes osteoarthritic pain versus pain from her degenerative meniscal tears versus pes anserine syndrome  Patient reports that she was told in the past that she can no longer have cortisone injections due to the potential side effect of straining her heart given her history of chronic systolic heart failure  On 10/8 in which hinged knee brace was prescribed for stability  The patient has undergone aspiration of her knee and a Visco injection since last visit.  Patient reports significant improvement of her right knee pain, mobility/function and ability to weight-bear since last visit      Other factors:  Currently, patient states she is in the process of trying to undergo lumbar surgery for the hardware in place already.  She is trying to hold off any right knee surgical intervention if possible for her lumbar surgery.   BMI 32  Congestive heart failure - reportedly under restrictions of no strenuous activities and  "seeing cardiac rehab currently  History of defibrillator implant  Type 2 diabetes    PLAN:    Clinical exam and radiographic imaging reviewed with patient today, with impression as per above. I have discussed with the patient the pathophysiology of this diagnosis and reviewed how the examination correlates with this diagnosis.    Reassured patient of her progressive improvement since last visit with the viscosupplementation injection of her right knee.  Given his improvement of pain, I counseled we can make a follow-up in 5 months to reevaluate and determine if he would need a repeat injection going forward.  Otherwise, in regards to her right knee she can return back to her work duties without restrictions.  She may be under restrictions in regards to her low back      Return in about 5 months (around 3/30/2025) for Follow up right knee.    Portions of the record may have been created with voice recognition software. Occasional wrong word or \"sound a like\" substitutions may have occurred due to the inherent limitations of voice recognition software. Read the chart carefully and recognize, using context, where substitutions have occurred.     CHIEF COMPLAINT:  Right knee pain follow up      HPI:  Vesna Langston is a 64 y.o. female  who presents for     Visit 10/30/2024:  Follow-up right knee pain  History of Visco injection on 10/3/2024.  History as per below  Patient reports improvement of her knee pain since this injection.  She reports improvements include intact range of motion and ability to walk much further with minimal to no pain of her knee.  She states at this point she feels a mild aching sensation over the anteromedial aspect but otherwise tolerable.  She states that she wears a compression knee sleeve for most of the day and feels that it helps.  She still does plan to wear hinged knee brace when returning back to her job.  She states she still under work accommodations in regards to her low back but would " "need a clearance letter regards to her right knee going forward for work.      Visit 10/16/2024:  Follow-up evaluation of right knee pain  Of note patient underwent a viscosupplementation injection of her right knee on 10/3/2024  She has a history of a meniscus tear of his right knee that she did not ultimately undergo surgical intervention -reportedly managed conservatively with cortisone injections, bracing, physical therapy.  However, due to her history of congestive heart failure, STEMI, patient was told to no longer receive cortisone injections of her right knee or prednisone in general.  On last visit 10/8/2024, patient reported a sense of worsening pain and instability from excessive walking.  I did prescribe her a hinged knee brace at that time that briefly did provide relief and support of her knee.  However patient had to go to the ER yesterday due to worsening pain, swelling and bruising of her knee.  She denies any new injury of her knee.    Visit 10/8/2024:  Follow-up evaluation of right knee pain  Patient recently seen on 10/3/2024 for which she was given a viscosupplementation injection of her right knee.  Patient admits that she may have been \"excessively\" using her right lower extremity for weightbearing after this injection.  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  She does not do any squatting, climbing ladders or working at elevations.  She also has been trying to be more active and went on a 10 mile walk.  She states the ground was flat and did not aggravate her knee much.  However the following day she went [place] with her grandchildren.  She states the ground was much more unsteady and aggravating to her knee and towards the end of the day she felt a sense of instability of her knee, stiffness and sharp/aching pains over the medial/lateral aspects.  She states her instability was bad enough that she had to use a cane.  She does not have a brace " but is here today asking whether we can supplement her brace.    Visit 9/17/2024:  Follow-up of ration of right knee pain  Of note history as per below.  Patient has an old MRI noting arthritic changes as well as medial/lateral meniscus tears in the past.  We had talked about a cortisone injection of her knee but at the time she was told her cardiologist to restrict her from prednisone/cortisone due to her cardiac disease history.  There was consideration for viscosupplementation injection of her knee but it was deferred at the time.  However, today patient states she is interested in pursuing the Visco injection for her right knee.  She states she is try to be more active and attend 5K walks but feels that with prolonged walking she has worsening medial/lateral aching/sharp pains, swelling, stiffness and has to rest frequently.  She does feel that use of the compression knee sleeve has helped mitigate some of the pain with activities.  She denies any new injuries of her right knee since last visit.  She reports continued crepitus of her knee.  She is asking whether I could reach out to her cardiologist to determine the risk of a cortisone injection for the future but would like to pursue a Visco injection first.    Separately, patient also wanted to address a left index finger pain.  She states she has noticed a localized cystic swelling along the side of her DIP joint.  She states that it has become more aggravating to do her craft activities at home due to the pain and restricted motion of her DIP joint.  She denies any discoloration or N/T.  She denies any injury of her index finger.  She had seen her PCP for this issue and was told it was a ganglion cyst and is here today to discuss whether there are treatments for this issue going forward there.  She states she has had prior fractures of her index finger in the past and thinks it may be due to the arthritis in her finger.    Visit 7/26/2024 :  Initial  evaluation of right knee pain  Of note, patient reports a prior work injury of her right knee in the past from a fall.  She did bring in an imaging study from 2017 of her MRI as noted above.  Images are not available to review today.  Reports she did not end up undergoing surgical interventions for her right knee and was treated conservatively with injections, bracing, physical therapy for period of time.  She states overall these interventions provided some relief but never felt that her pain was fully resolved.  Furthermore, she is currently undergoing cardiac rehab given her history of systolic congestive heart failure, h/o STEMI.  She states she is under a restriction of no strenuous activities.  She states she previously underwent cortisone injections but due to her cardiac condition, she was told that she can no longer receive cortisone/prednisone.  In regards to her right knee pain, she reports has been worsening over the past several weeks.  No precipitating injury.  She states the pain is mainly over the medial aspect of her knee.  Despite being on a limited activity restriction from cardiology, she feels it can be aggravated after performing stationary biking.  Describes the pain as a burning sensation of moderate to severe intensity depending on how active she is.  She denies any noticeable swelling.  Denies any discoloration.  Reports stiffness with knee range of motion's when aggravated.  Reports crepitus of her knee which is chronic.  In regards to treatment she reports use of Tylenol, icing and resting all of which provide some relief.  She is unsure if her prior injury/meniscal tears in the past are contributing factor as she does not want to undergo surgical intervention if possible.      Medical, Surgical, Family, and Social History    Past Medical History:   Diagnosis Date    Acute respiratory insufficiency 03/22/2024    Allergic     Chronic HFrEF (heart failure with reduced ejection fraction)  (HCC)     Coronary artery disease     COVID-19 virus infection 2022    Diabetes mellitus (HCC)     Disease of thyroid gland 2024    Heart disease 3/24    Hyperlipidemia     Hypoglycemia     ICD (implantable cardioverter-defibrillator) in place     Ischemic cardiomyopathy     Lactic acidosis 2024    Myocardial infarction (HCC) 3/22/2024    Otitis media 1966    Seasonal allergies     ST elevation myocardial infarction involving left anterior descending (LAD) coronary artery (HCC) 2024    Tobacco abuse     Visual impairment 1967     Past Surgical History:   Procedure Laterality Date    ADENOIDECTOMY      APPENDECTOMY      APPENDECTOMY LAPAROSCOPIC N/A 2024    Procedure: APPENDECTOMY LAPAROSCOPIC;  Surgeon: Lauryn Ullrich, DO;  Location: [facility];  Service: General    BACK SURGERY      BREAST EXCISIONAL BIOPSY Right 2000    cyst removed- benign    CARDIAC CATHETERIZATION N/A 2024    Procedure: Cardiac pci;  Surgeon: Gabriel Myers MD;  Location: [facility];  Service: Cardiology    CARDIAC CATHETERIZATION N/A 2024    Procedure: Cardiac Coronary Angiogram;  Surgeon: Gabriel Myers MD;  [facility];  Service: Cardiology    CARDIAC CATHETERIZATION N/A 2024    Procedure: Cardiac PCI AMI;  Surgeon: Gabriel Myers MD;  [facility];  Service: Cardiology    CARDIAC CATHETERIZATION N/A 2024    Procedure: Cardiac IVUS;  Surgeon: Gabriel Myers MD;  [facility];  Service: Cardiology    CARDIAC DEFIBRILLATOR PLACEMENT      CARDIAC ELECTROPHYSIOLOGY PROCEDURE N/A 2024    Procedure: Cardiac icd implant;  Surgeon: Jeffy Lama MD;  [facility];  Service: Cardiology     SECTION      COLONOSCOPY      ECTOPIC PREGNANCY SURGERY      INSERT / REPLACE / REMOVE PACEMAKER      MASS EXCISION Left 10/18/2024    Procedure: EXCISION BIOPSY TISSUE LESION/MASS UPPER EXTREMITY - Left index finger;   "Surgeon: Leandro Campos MD;  Location:  [facility];  Service: Orthopedics    SEPTOPLASTY      SPINE SURGERY  23    TONSILLECTOMY       Social History   Social History     Substance and Sexual Activity   Alcohol Use Not Currently    Alcohol/week: 1.0 standard drink of alcohol    Types: 1 Shots of liquor per week    Comment: Every 2or 3months     Social History     Substance and Sexual Activity   Drug Use Never     Social History     Tobacco Use   Smoking Status Former    Current packs/day: 0.00    Average packs/day: 1 pack/day for 24.2 years (24.2 ttl pk-yrs)    Types: Cigarettes    Start date: 2000    Quit date: 3/22/2024    Years since quittin.6    Passive exposure: Never   Smokeless Tobacco Never   Tobacco Comments    Smoked 0.5-1 ppd; quit in 2024.      Family History   Adopted: Yes   Problem Relation Age of Onset    Depression Mother     Heart disease Father     OCD Daughter      Allergies   Allergen Reactions    Fish-Derived Products - Food Allergy Anaphylaxis     Cannot have all seafood products    Shellfish-Derived Products - Food Allergy Anaphylaxis    Azithromycin Hives and Rash          Physical Exam  /60   Pulse 60   Temp 97.6 °F (36.4 °C) (Temporal)   Ht 5' 8\" (1.727 m)   Wt 99.8 kg (220 lb)   SpO2 98%   BMI 33.45 kg/m²     Constitutional:  see vital signs  Gen: well-developed, normocephalic/atraumatic, well-groomed  Eyes: No inflammation or discharge of conjunctiva or lids; sclera clear   Pulmonary/Chest: Effort normal. No respiratory distress.     Ortho Exam  Right Knee Exam:  Erythema: no  Swelling: no  Increased Warmth: no  Tenderness: + Minimally over the anteromedial joint line  ROM: 0-140  Knee flexion strength: 5/5  Knee extension strength: 5/5  Patellar Grind: +  Varus laxity: negative  Valgus laxity: negative    No calf tenderness to palpation     Gait: Normal without antalgia      Procedures        "

## 2024-11-01 ENCOUNTER — TELEPHONE (OUTPATIENT)
Age: 64
End: 2024-11-01

## 2024-11-01 ENCOUNTER — OFFICE VISIT (OUTPATIENT)
Dept: FAMILY MEDICINE CLINIC | Facility: CLINIC | Age: 64
End: 2024-11-01
Payer: COMMERCIAL

## 2024-11-01 VITALS
BODY MASS INDEX: 34.19 KG/M2 | WEIGHT: 225.6 LBS | DIASTOLIC BLOOD PRESSURE: 70 MMHG | HEIGHT: 68 IN | SYSTOLIC BLOOD PRESSURE: 120 MMHG

## 2024-11-01 DIAGNOSIS — I25.5 ISCHEMIC CARDIOMYOPATHY: Chronic | ICD-10-CM

## 2024-11-01 DIAGNOSIS — R60.0 LOCALIZED EDEMA: ICD-10-CM

## 2024-11-01 DIAGNOSIS — E03.9 ACQUIRED HYPOTHYROIDISM: ICD-10-CM

## 2024-11-01 DIAGNOSIS — I25.10 CORONARY ARTERY DISEASE INVOLVING NATIVE CORONARY ARTERY OF NATIVE HEART WITHOUT ANGINA PECTORIS: Chronic | ICD-10-CM

## 2024-11-01 DIAGNOSIS — E11.9 TYPE 2 DIABETES MELLITUS WITHOUT COMPLICATION, WITHOUT LONG-TERM CURRENT USE OF INSULIN (HCC): Primary | ICD-10-CM

## 2024-11-01 DIAGNOSIS — G47.33 OSA (OBSTRUCTIVE SLEEP APNEA): ICD-10-CM

## 2024-11-01 PROCEDURE — 99214 OFFICE O/P EST MOD 30 MIN: CPT | Performed by: FAMILY MEDICINE

## 2024-11-01 NOTE — PATIENT INSTRUCTIONS
Overall seems to be doing better although did have a problem with shortness of breath last week.  The cardiac output continues to improve.  Try to push daily walking activity and watch the starch in the diet.

## 2024-11-01 NOTE — PROGRESS NOTES
Ambulatory Visit  Name: Vesna Langston      : 1960      MRN: [id number]  Encounter Provider: Brandon Pete MD  Encounter Date: 2024   Encounter department: [facility]    Assessment & Plan  Type 2 diabetes mellitus without complication, without long-term current use of insulin (HCC)  Patient concerned about her A1c and the weight gain.  Urged to push activity as tolerated.  Will continue with baseline meds and will repeat the A1c in 1 month  Lab Results   Component Value Date    HGBA1C 7.2 (H) 2024            Localized edema  Despite the weight gain does appear to be doing well at this time and should continue current regimen       Acquired hypothyroidism  Seems stable at this time we will continue current regimen.  Will repeat the thyroid levels at next visit       Coronary artery disease involving native coronary artery of native heart without angina pectoris  Seems to be gradually improving.  Continue current regimen and follow-up       Ischemic cardiomyopathy  Cardiac output has become noticeably better and I feel will probably continue to improve some       POP (obstructive sleep apnea)  Still is awaiting the CPAP which I feel will be useful            History of Present Illness         Patient had 3 recent hospitalizations the first for MI then for problems with syncope and was found to be in atrial flutter.  Did have pacemaker placed with defibrillator.  Last hospitalization was for GI blood loss and was found to have severe ulcerations in the stomach.  Is on protonic at this time as well as Carafate and no longer having pain.  The Carafate has now been stopped.  Was started on levothyroxine for problems with hypothyroidism while in the hospital.  Is following up with cardiology and interventional cardiology in [place].  Is now doing cardiac rehab.  Was having a lot of problems with stress but is feeling much better at this time.  Had been having  trouble retaining fluid with some swelling in the legs.  Did increase the Lasix to twice a day every other day and this is a little better.  Just had echocardiogram done which showed cardiac output had increased to 45%.  Has been following instructions from dietitian for her diabetes and feels better but is concerned about gaining weight.  Had episode of shortness of breath in the past week and was in the ER although rhythm was okay at this time and did not noticed the defibrillator being set off      Review of Systems   Constitutional:  Negative for appetite change and fatigue.   Respiratory:  Positive for chest tightness and shortness of breath.    Cardiovascular:  Positive for palpitations.   Gastrointestinal:  Negative for constipation and diarrhea.     Past Medical History:   Diagnosis Date    Acute respiratory insufficiency 03/22/2024    Allergic     Chronic HFrEF (heart failure with reduced ejection fraction) ([facility])     Coronary artery disease     COVID-19 virus infection 11/28/2022    Diabetes mellitus ([facility])     Disease of thyroid gland 4/09/2024    Heart disease 3/24    Hyperlipidemia     Hypoglycemia     ICD (implantable cardioverter-defibrillator) in place     Ischemic cardiomyopathy     Lactic acidosis 03/22/2024    Myocardial infarction ([facility]) 3/22/2024    Otitis media 1966    Seasonal allergies     ST elevation myocardial infarction involving left anterior descending (LAD) coronary artery ([facility]) 03/22/2024    Tobacco abuse     Visual impairment 1967     Past Surgical History:   Procedure Laterality Date    ADENOIDECTOMY      APPENDECTOMY      APPENDECTOMY LAPAROSCOPIC N/A 05/08/2024    Procedure: APPENDECTOMY LAPAROSCOPIC;  Surgeon: Lauryn Ullrich, DO;  Location: [facility];  Service: General    BACK SURGERY  8/23    BREAST EXCISIONAL BIOPSY Right 09/2000    cyst removed- benign    CARDIAC CATHETERIZATION N/A 03/22/2024    Procedure: Cardiac pci;  Surgeon: Gabriel Myers MD;  [facility]: [facility];   Service: Cardiology    CARDIAC CATHETERIZATION N/A 2024    Procedure: Cardiac Coronary Angiogram;  Surgeon: Gabriel Myers MD;  [facility];  Service: Cardiology    CARDIAC CATHETERIZATION N/A 2024    Procedure: Cardiac PCI AMI;  Surgeon: Gabriel Myers MD;  [facility];  Service: Cardiology    CARDIAC CATHETERIZATION N/A 2024    Procedure: Cardiac IVUS;  Surgeon: Gabriel Myers MD;  [facility];  Service: Cardiology    CARDIAC DEFIBRILLATOR PLACEMENT      CARDIAC ELECTROPHYSIOLOGY PROCEDURE N/A 2024    Procedure: Cardiac icd implant;  Surgeon: Jeffy Lama MD;  [facility];  Service: Cardiology     SECTION      COLONOSCOPY      ECTOPIC PREGNANCY SURGERY      INSERT / REPLACE / REMOVE PACEMAKER      MASS EXCISION Left 10/18/2024    Procedure: EXCISION BIOPSY TISSUE LESION/MASS UPPER EXTREMITY - Left index finger;  Surgeon: Leandro Campos MD;  Location:  MAIN OR;  Service: Orthopedics    SEPTOPLASTY      SPINE SURGERY  23    TONSILLECTOMY       Family History   Adopted: Yes   Problem Relation Age of Onset    Depression Mother     Heart disease Father     OCD Daughter      Social History     Tobacco Use    Smoking status: Former     Current packs/day: 0.00     Average packs/day: 1 pack/day for 24.2 years (24.2 total pack years)     Types: Cigarettes     Start date: 2000     Quit date: 3/22/2024     Years since quittin.6     Passive exposure: Never    Smokeless tobacco: Never    Tobacco comments:     Smoked 0.5-1 ppd; quit in 2024.    Vaping Use    Vaping status: Never Used   Substance and Sexual Activity    Alcohol use: Not Currently     Alcohol/week: 1.0 standard drink of alcohol     Types: 1 Shots of liquor per week     Comment: Every 2or 3months    Drug use: Never    Sexual activity: Not on file     Current Outpatient Medications on File Prior to Visit   Medication Sig    albuterol (ProAir HFA) 90  mcg/act inhaler Inhale 2 puffs every 6 (six) hours as needed for wheezing    apixaban (ELIQUIS) 5 mg Take 1 tablet (5 mg total) by mouth 2 (two) times a day    atorvastatin (LIPITOR) 80 mg tablet TAKE 1 TABLET BY MOUTH EVERY EVENING    clopidogrel (PLAVIX) 75 mg tablet Take 1 tablet (75 mg total) by mouth daily    diphenhydrAMINE (BENADRYL) 25 mg capsule Take 25 mg by mouth every 6 (six) hours as needed for itching    Durolane 60 MG/3ML injection     Empagliflozin (Jardiance) 10 MG TABS tablet Take 1 tablet (10 mg total) by mouth every morning    furosemide (LASIX) 20 mg tablet Take 1 tablet (20 mg total) by mouth daily Alternating every other day with 1 tablet daily    glimepiride (AMARYL) 1 mg tablet Take 1 tablet (1 mg total) by mouth daily with breakfast    HYDROcodone-acetaminophen (NORCO) 5-325 mg per tablet Take 1 tablet by mouth every 6 (six) hours as needed for pain for up to 10 doses Max Daily Amount: 4 tablets    isosorbide mononitrate (IMDUR) 30 mg 24 hr tablet Take 1 tablet (30 mg total) by mouth daily    levothyroxine (Synthroid) 75 mcg tablet Take 1 tablet (75 mcg total) by mouth daily (Patient taking differently: Take 75 mcg by mouth daily Takes at lunch time)    meclizine (ANTIVERT) 25 mg tablet Take 1 tablet (25 mg total) by mouth every 8 (eight) hours as needed for dizziness    metoprolol succinate (TOPROL-XL) 25 mg 24 hr tablet Take 2 tablets (50 mg total) by mouth daily at bedtime    pantoprazole (PROTONIX) 40 mg tablet TAKE 1 TABLET BY MOUTH 2 TIMES A DAY BEFORE MEALS.    sacubitril-valsartan (Entresto) 49-51 MG TABS Take 1 tablet by mouth 2 (two) times a day    spironolactone (ALDACTONE) 25 mg tablet Take 1 tablet (25 mg total) by mouth daily    gabapentin (Neurontin) 100 mg capsule Take 1 capsule (100 mg total) by mouth 2 (two) times a day (Patient not taking: Reported on 10/29/2024)     Allergies   Allergen Reactions    Fish-Derived Products - Food Allergy Anaphylaxis     Cannot have all  "seafood products    Shellfish-Derived Products - Food Allergy Anaphylaxis    Azithromycin Hives and Rash     Immunization History   Administered Date(s) Administered    COVID-19 MODERNA VACC 0.5 ML IM 04/19/2021, 05/17/2021    COVID-19 Moderna mRNA Vaccine 12 Yr+ 50 mcg/0.5 mL (Spikevax) 09/04/2024    Hep A / Hep B 09/05/2024, 10/03/2024    INFLUENZA 10/10/2019, 09/23/2021, 09/22/2022, 12/13/2023    Influenza Split High Dose Preservative Free IM 09/04/2024    Pneumococcal Conjugate Vaccine 20-valent (Pcv20), Polysace 04/04/2023    Respiratory Syncytial Virus Vaccine (Recombinant, Adjuvanted) 09/05/2024    Tdap 09/18/2020    Typhoid, Unspecified 10/10/2019    Zoster Vaccine Recombinant 09/18/2020, 01/04/2021     Objective     Blood Pressure 120/70 (BP Location: Left arm, Patient Position: Sitting, Cuff Size: Standard)   Height 5' 8\" (1.727 m)   Weight 102 kg (225 lb 9.6 oz)   Body Mass Index 34.30 kg/m²     Physical Exam  Vitals and nursing note reviewed.   Constitutional:       General: She is not in acute distress.     Appearance: She is well-developed.   HENT:      Head: Normocephalic and atraumatic.   Eyes:      Conjunctiva/sclera: Conjunctivae normal.   Cardiovascular:      Rate and Rhythm: Normal rate and regular rhythm.      Heart sounds: No murmur heard.  Pulmonary:      Effort: Pulmonary effort is normal. No respiratory distress.      Breath sounds: Normal breath sounds.   Abdominal:      Palpations: Abdomen is soft.      Tenderness: There is no abdominal tenderness.   Musculoskeletal:         General: No swelling.      Cervical back: Neck supple.      Left lower leg: No edema.   Skin:     General: Skin is warm and dry.      Capillary Refill: Capillary refill takes less than 2 seconds.   Neurological:      Mental Status: She is alert.      Deep Tendon Reflexes: Reflexes abnormal.   Psychiatric:         Mood and Affect: Mood normal.         " sounds.   Abdominal:      General: Abdomen is flat.      Palpations: Abdomen is soft. There is no mass.      Tenderness: There is no abdominal tenderness.   Musculoskeletal:         General: No swelling.      Cervical back: Neck supple. No tenderness.      Right lower leg: No edema.      Left lower leg: No edema.   Lymphadenopathy:      Cervical: No cervical adenopathy.   Skin:     General: Skin is warm and dry.      Capillary Refill: Capillary refill takes less than 2 seconds.      Findings: No rash.   Neurological:      Mental Status: She is alert.      Motor: No weakness.      Coordination: Coordination normal.      Gait: Gait normal.      Deep Tendon Reflexes: Reflexes abnormal (Reflexes 1+).   Psychiatric:         Mood and Affect: Mood normal.

## 2024-11-01 NOTE — TELEPHONE ENCOUNTER
11/1/24 RECEIVED EMAIL FROM PT;  First Name: Danny  Last Name: Kian  YOB: 1960  Email: [email or web address]  Phone: [phone]   Address: address  City: city  State: [facility]  Zip: [address]  What are Your Symptoms?:   Do you have a neuro specialist?: No    CALLED PT AND LMOM FOR HER TO CB TO DISCUSS NEED FOR APPT, NOT SURE IF PT IS WANTING [facility] OR [facility].

## 2024-11-02 PROBLEM — K92.0 COFFEE GROUND EMESIS: Status: RESOLVED | Noted: 2024-04-07 | Resolved: 2024-11-02

## 2024-11-02 NOTE — ASSESSMENT & PLAN NOTE
Despite the weight gain does appear to be doing well at this time and should continue current regimen

## 2024-11-02 NOTE — ASSESSMENT & PLAN NOTE
Cardiac output has become noticeably better and I feel will probably continue to improve some

## 2024-11-02 NOTE — ASSESSMENT & PLAN NOTE
Patient concerned about her A1c and the weight gain.  Urged to push activity as tolerated.  Will continue with baseline meds and will repeat the A1c in 1 month  Lab Results   Component Value Date    HGBA1C 7.2 (H) 09/27/2024

## 2024-11-02 NOTE — ASSESSMENT & PLAN NOTE
Seems stable at this time we will continue current regimen.  Will repeat the thyroid levels at next visit

## 2024-11-05 ENCOUNTER — REMOTE DEVICE CLINIC VISIT (OUTPATIENT)
Dept: CARDIOLOGY CLINIC | Facility: CLINIC | Age: 64
End: 2024-11-05
Payer: COMMERCIAL

## 2024-11-05 ENCOUNTER — NURSE TRIAGE (OUTPATIENT)
Age: 64
End: 2024-11-05

## 2024-11-05 ENCOUNTER — CLINICAL SUPPORT (OUTPATIENT)
Dept: NUTRITION | Facility: CLINIC | Age: 64
End: 2024-11-05
Payer: COMMERCIAL

## 2024-11-05 VITALS — WEIGHT: 227.6 LBS | BODY MASS INDEX: 34.61 KG/M2

## 2024-11-05 DIAGNOSIS — I25.5 ISCHEMIC CARDIOMYOPATHY: Primary | ICD-10-CM

## 2024-11-05 DIAGNOSIS — Z95.810 PRESENCE OF AUTOMATIC CARDIOVERTER/DEFIBRILLATOR (AICD): Primary | ICD-10-CM

## 2024-11-05 PROCEDURE — 93296 REM INTERROG EVL PM/IDS: CPT | Performed by: INTERNAL MEDICINE

## 2024-11-05 PROCEDURE — 97803 MED NUTRITION INDIV SUBSEQ: CPT | Performed by: DIETITIAN, REGISTERED

## 2024-11-05 PROCEDURE — 93295 DEV INTERROG REMOTE 1/2/MLT: CPT | Performed by: INTERNAL MEDICINE

## 2024-11-05 NOTE — TELEPHONE ENCOUNTER
"Reason for Conversation: received call from pt.  She has gained 8 lbs in a week.  Weight last week was 220 lbs.  Yesterday she was 225 lbs, and today is 228 lbs.  She has been having worsening SOB occurring both at rest and with activity.  Denies chest pain.  She continues with lower back pain.  She has swelling to hands, legs, and abdomen.  She has been taking extra Lasix 20 mg in the afternoon for 3 days, but states it is not helping.  I scheduled pt visit with Eunice Owen PA-C tomorrow for further evaluation.  Advised pt ED if symptoms worsen.    VS/Weight: (Note: Please include date/time vitals/weight were measured)  SpO2 98%. Weight today 228 lbs     Pain: No    Risk Factors: Heart Failure and Other atrial flutter, history of PE, diabetes    Recent relevant testing and date of testing: Labs and Other cxr 10/28, device report 11/5      Medication: eliquis 5 mg bid, entresto 49-51 mg bid, lasix 20 mg qd, jardiance 10 mg qd, imdur 30 mg qd, lipitor 80 mg qd, metoprolol succinate 40 mg qd, spironolactone 25 mg qd    Upcoming Office Visit: Yes    Last Office Visit: 10/14      Reason for Disposition   MILD difficulty breathing (e.g., minimal/no SOB at rest, SOB with walking, pulse <100) and new-onset or worse than normal (i.e., patient's baseline)    Answer Assessment - Initial Assessment Questions  1. MAIN CONCERN OR SYMPTOM: \"What is your main concern right now?\" \"What's the main symptom you're worried about?\" (e.g., breathing difficulty, ankle swelling, weight gain).      Weight gain   2. ONSET: \"When did the  weight gain  start?\"      Week ago   3. BREATHING DIFFICULTY: \"Are you having any difficulty breathing?\" If Yes, ask: \"How bad is it?\"  (e.g., none, mild, moderate, severe)  \"Is this worse than usual for you?\"      Yes, SOB with activity and at rest. Worsening   4. EDEMA - FOOT-LEG SWELLING: \"Do you have swelling of your ankles, feet or legs?\" If Yes, ask: \"How bad is the swelling?\" (e.g., localized; " "mild, moderate, severe)      Entire leg   5. WEIGHT - CURRENT: \"What is your weight today?\"      228 lbs   7. WEIGHT - CHANGE: \"Have you gained (or lost) weight in the past 24 hours? Past week (7 days)?\" If Yes, ask:  \"How much weight?\"      Week ago was 220 lbs   8. OTHER SYMPTOMS: \"Do you have any other symptoms?\" (e.g., depression, weakness or fatigue, abdomen bloating, hacky cough)      Lower back pain   9. DIURETICS: \"Are you currently taking water pills?\" (e.g., furosemide [Lasix], hydrochlorothiazide [HCTZ], bumetanide [Bumex], metolazone [Zaroxolyn]) If Yes, ask: \"What medicine are you taking, and how often?\"  \"Any recent change in dose?\"       Lasix 20 mg   10. O2 SATURATION MONITOR: \"Do you use an oxygen saturation monitor (pulse oximeter) at home?\" If Yes, ask: \"What is your reading (oxygen level) today?\" \"What is your usual oxygen saturation reading?\" (e.g., 95%)        98%   11. HEART FAILURE HCP: \"Who treats your heart failure?\"  (e.g., cardiologist or heart specialist, heart failure clinic or center, primary care doctor)        Dr. Bell   12. FLUID and SODIUM RESTRICTIONS: \"Have you been instructed to restrict your daily fluid intake or sodium (salt) intake?\" If Yes, ask: \"What are your daily limits?\"        No increased salt    Protocols used: Heart Failure on Treatment Follow-up Call-Adult-[place]    "

## 2024-11-05 NOTE — TELEPHONE ENCOUNTER
11/5/24 2ND ATTEMPT: CALLED PT AND LMOM FOR HER TO CB TO DISCUSS NEED FOR APPT, NOT SURE IF PT IS WANTING [facility] OR [facility].

## 2024-11-06 ENCOUNTER — DOCUMENTATION (OUTPATIENT)
Dept: CARDIOLOGY CLINIC | Facility: CLINIC | Age: 64
End: 2024-11-06

## 2024-11-06 ENCOUNTER — TELEPHONE (OUTPATIENT)
Age: 64
End: 2024-11-06

## 2024-11-06 ENCOUNTER — OFFICE VISIT (OUTPATIENT)
Dept: URGENT CARE | Age: 64
End: 2024-11-06
Payer: COMMERCIAL

## 2024-11-06 ENCOUNTER — OFFICE VISIT (OUTPATIENT)
Dept: CARDIOLOGY CLINIC | Facility: CLINIC | Age: 64
End: 2024-11-06
Payer: COMMERCIAL

## 2024-11-06 VITALS
RESPIRATION RATE: 20 BRPM | WEIGHT: 218 LBS | TEMPERATURE: 97.4 F | OXYGEN SATURATION: 98 % | BODY MASS INDEX: 33.04 KG/M2 | HEART RATE: 82 BPM | HEIGHT: 68 IN

## 2024-11-06 VITALS
SYSTOLIC BLOOD PRESSURE: 100 MMHG | DIASTOLIC BLOOD PRESSURE: 68 MMHG | HEART RATE: 71 BPM | HEIGHT: 68 IN | OXYGEN SATURATION: 97 % | WEIGHT: 223.9 LBS | BODY MASS INDEX: 33.93 KG/M2

## 2024-11-06 DIAGNOSIS — I50.22 HEART FAILURE WITH MID-RANGE EJECTION FRACTION (HFMEF) (HCC): Primary | ICD-10-CM

## 2024-11-06 DIAGNOSIS — R82.90 MALODOROUS URINE: ICD-10-CM

## 2024-11-06 DIAGNOSIS — R35.0 URINE FREQUENCY: Primary | ICD-10-CM

## 2024-11-06 LAB
SL AMB  POCT GLUCOSE, UA: 100
SL AMB LEUKOCYTE ESTERASE,UA: NEGATIVE
SL AMB POCT BILIRUBIN,UA: NEGATIVE
SL AMB POCT BLOOD,UA: ABNORMAL
SL AMB POCT CLARITY,UA: CLEAR
SL AMB POCT COLOR,UA: CLEAR
SL AMB POCT KETONES,UA: NEGATIVE
SL AMB POCT NITRITE,UA: NEGATIVE
SL AMB POCT PH,UA: 6
SL AMB POCT SPECIFIC GRAVITY,UA: 1
SL AMB POCT URINE PROTEIN: NEGATIVE
SL AMB POCT UROBILINOGEN: 0.2

## 2024-11-06 PROCEDURE — 96374 THER/PROPH/DIAG INJ IV PUSH: CPT

## 2024-11-06 PROCEDURE — 99214 OFFICE O/P EST MOD 30 MIN: CPT | Performed by: PHYSICIAN ASSISTANT

## 2024-11-06 PROCEDURE — G0382 LEV 3 HOSP TYPE B ED VISIT: HCPCS

## 2024-11-06 PROCEDURE — S9083 URGENT CARE CENTER GLOBAL: HCPCS

## 2024-11-06 PROCEDURE — 81002 URINALYSIS NONAUTO W/O SCOPE: CPT

## 2024-11-06 PROCEDURE — 87086 URINE CULTURE/COLONY COUNT: CPT

## 2024-11-06 RX ORDER — FUROSEMIDE 10 MG/ML
60 INJECTION INTRAMUSCULAR; INTRAVENOUS ONCE
Status: COMPLETED | OUTPATIENT
Start: 2024-11-06 | End: 2024-11-06

## 2024-11-06 RX ORDER — FUROSEMIDE 20 MG/1
TABLET ORAL
Qty: 190 TABLET | Refills: 0 | Status: SHIPPED | OUTPATIENT
Start: 2024-11-06 | End: 2025-02-04

## 2024-11-06 RX ADMIN — FUROSEMIDE 60 MG: 10 INJECTION INTRAMUSCULAR; INTRAVENOUS at 11:20

## 2024-11-06 NOTE — TELEPHONE ENCOUNTER
Pt was just seen today in office for IV lasix. Pt stated she was also told to go to urgent care to rule out a UTI which she did. Pt stated when she was weighed at urgent care, she was 218lbs instead of 223lbs this am. She stated the IV lasix is definitely working but wants to make sure if is safe that she lost this much weight in a short time period. She stated she does already feel much better, stating she can breath easier now. She stated her BP was 117/80, HR 82 at urgent care.    Advised pt will send a message to the provider to review and advise.

## 2024-11-06 NOTE — PROGRESS NOTES
[facility]        NAME: Vesna Langston is a 64 y.o. female  : 1960    MRN: [id number]  DATE: 2024  TIME: 2:19 PM    Assessment and Plan   Urine frequency [R35.0]  1. Urine frequency  POCT urine dip    Urine culture    Urine culture      Urine dip grossly normal in office, urine culture results pending.   Continue to closely monitor symptoms, go to emergency department for fever or flank pain.       Patient Instructions   Patient Education     Urinary Tract Infection, Adult ED   General Information   You came to the Emergency Department (ED) for a urinary tract infection or UTI. Most UTIs are infections in either your bladder or your kidneys. Bladder infections are more common and may also be called cystitis. Kidney infections are more serious and may also be called pyelonephritis. You need antibiotics to treat a UTI. It is important to take all of your antibiotics even if you start to feel better.  What care is needed at home?   Call your regular doctor to let them know you were in the ED. Make a follow-up appointment if you were told to.  For the first day or so, you may want to take an over-the-counter medicine, like phenazopyridine. This will help to numb your bladder. You will also not have the strong urge to urinate. This medicine causes your urine and tears to look orange. If you have kidney disease, talk to your doctor before taking this medicine.  To lower your chance of getting a UTI in the future, you can:  Drink extra fluids.  If you have sex, urinate right afterwards.  When do I need to get emergency help?   Return to the ED if:   You have very bad pain in your back, shoulder, or belly.  You have a fever of 102.2°F (39°C); shaking chills or sweats even though you are taking antibiotics.  When do I need to call the doctor?   You have a fever up to 100.4°F (38°C).  You notice more blood in your urine.  Your signs get worse or do not improve within 24 hours of starting  treatment.  You are not able to urinate for more than 8 hours  Your signs come back after treatment has stopped.  You have new or worsening symptoms.  Last Reviewed Date   2021-03-12  Consumer Information Use and Disclaimer   This generalized information is a limited summary of diagnosis, treatment, and/or medication information. It is not meant to be comprehensive and should be used as a tool to help the user understand and/or assess potential diagnostic and treatment options. It does NOT include all information about conditions, treatments, medications, side effects, or risks that may apply to a specific patient. It is not intended to be medical advice or a substitute for the medical advice, diagnosis, or treatment of a health care provider based on the health care provider's examination and assessment of a patient’s specific and unique circumstances. Patients must speak with a health care provider for complete information about their health, medical questions, and treatment options, including any risks or benefits regarding use of medications. This information does not endorse any treatments or medications as safe, effective, or approved for treating a specific patient. [employer] and its affiliates disclaim any warranty or liability relating to this information or the use thereof. The use of this information is governed by the Terms of Use, available at https://www.[employer].com/en/know/clinical-effectiveness-terms   Copyright   Copyright © 2024 [employer] and its affiliates and/or licensors. All rights reserved.       Follow up with PCP in 3-5 days.  Proceed to  ER if symptoms worsen.    Chief Complaint     Chief Complaint   Patient presents with    Possible UTI     Having burning with urination especially with voiding. She is on treatment for CHF and just took iv lasix today. She was on jiardiance but has had issues with uti in past.          History of Present Illness       Patient is a 64 year  old female with PMH significant for CHF, diabetes mellitus, who presents for evaluation of dysuria over the past 2 days. Patient reports that she was given a 60 mg dose of Lasix today in her cardiologist's office for 8 lb weight gain, and thus has been urinating more frequently. She continues to note dysuria, as well as low back pain. She does note that her urine is clearer since being given the Lasix. She denies fever, chills, vaginal discharge, change in bowel habit.         Review of Systems   Review of Systems   Constitutional:  Negative for fatigue and fever.   HENT:  Negative for congestion, ear discharge, ear pain, postnasal drip, rhinorrhea, sinus pressure, sinus pain, sneezing and sore throat.    Eyes: Negative.  Negative for pain, discharge, redness and itching.   Respiratory: Negative.  Negative for apnea, cough, choking, chest tightness, shortness of breath, wheezing and stridor.    Cardiovascular: Negative.  Negative for chest pain and palpitations.   Gastrointestinal: Negative.  Negative for diarrhea, nausea and vomiting.   Endocrine: Negative.  Negative for polydipsia, polyphagia and polyuria.   Genitourinary:  Positive for dysuria and frequency. Negative for decreased urine volume, difficulty urinating, dyspareunia, enuresis, flank pain, genital sores, hematuria, menstrual problem, pelvic pain, urgency, vaginal bleeding, vaginal discharge and vaginal pain.   Musculoskeletal: Negative.  Negative for arthralgias, back pain, myalgias, neck pain and neck stiffness.   Skin: Negative.  Negative for color change and rash.   Allergic/Immunologic: Negative.  Negative for environmental allergies.   Neurological: Negative.  Negative for dizziness, facial asymmetry, light-headedness, numbness and headaches.   Hematological: Negative.  Negative for adenopathy.   Psychiatric/Behavioral: Negative.           Current Medications       Current Outpatient Medications:     albuterol (ProAir HFA) 90 mcg/act inhaler,  Inhale 2 puffs every 6 (six) hours as needed for wheezing, Disp: 8.5 g, Rfl: 0    apixaban (ELIQUIS) 5 mg, Take 1 tablet (5 mg total) by mouth 2 (two) times a day, Disp: 180 tablet, Rfl: 1    atorvastatin (LIPITOR) 80 mg tablet, TAKE 1 TABLET BY MOUTH EVERY EVENING, Disp: 100 tablet, Rfl: 1    clopidogrel (PLAVIX) 75 mg tablet, Take 1 tablet (75 mg total) by mouth daily, Disp: 30 tablet, Rfl: 5    diphenhydrAMINE (BENADRYL) 25 mg capsule, Take 25 mg by mouth every 6 (six) hours as needed for itching, Disp: , Rfl:     Durolane 60 MG/3ML injection, , Disp: , Rfl:     furosemide (LASIX) 20 mg tablet, Take 2 tablets (40 mg total) by mouth 2 (two) times a day for 5 days, THEN 2 tablets (40 mg total) daily., Disp: 190 tablet, Rfl: 0    gabapentin (Neurontin) 100 mg capsule, Take 1 capsule (100 mg total) by mouth 2 (two) times a day, Disp: , Rfl:     glimepiride (AMARYL) 1 mg tablet, Take 1 tablet (1 mg total) by mouth daily with breakfast, Disp: 90 tablet, Rfl: 3    HYDROcodone-acetaminophen (NORCO) 5-325 mg per tablet, Take 1 tablet by mouth every 6 (six) hours as needed for pain for up to 10 doses Max Daily Amount: 4 tablets, Disp: 10 tablet, Rfl: 0    isosorbide mononitrate (IMDUR) 30 mg 24 hr tablet, Take 1 tablet (30 mg total) by mouth daily, Disp: 90 tablet, Rfl: 5    levothyroxine (Synthroid) 75 mcg tablet, Take 1 tablet (75 mcg total) by mouth daily (Patient taking differently: Take 75 mcg by mouth daily Takes at lunch time), Disp: 90 tablet, Rfl: 3    meclizine (ANTIVERT) 25 mg tablet, Take 1 tablet (25 mg total) by mouth every 8 (eight) hours as needed for dizziness, Disp: 30 tablet, Rfl: 0    metoprolol succinate (TOPROL-XL) 25 mg 24 hr tablet, Take 2 tablets (50 mg total) by mouth daily at bedtime, Disp: 180 tablet, Rfl: 5    pantoprazole (PROTONIX) 40 mg tablet, TAKE 1 TABLET BY MOUTH 2 TIMES A DAY BEFORE MEALS., Disp: 60 tablet, Rfl: 3    sacubitril-valsartan (Entresto) 49-51 MG TABS, Take 1 tablet by mouth  2 (two) times a day, Disp: 180 tablet, Rfl: 5    spironolactone (ALDACTONE) 25 mg tablet, Take 1 tablet (25 mg total) by mouth daily, Disp: 90 tablet, Rfl: 5  No current facility-administered medications for this visit.    Current Allergies     Allergies as of 11/06/2024 - Reviewed 11/06/2024   Allergen Reaction Noted    Fish-derived products - food allergy Anaphylaxis 10/01/2024    Shellfish-derived products - food allergy Anaphylaxis 06/10/2019    Azithromycin Hives and Rash 10/10/2012            The following portions of the patient's history were reviewed and updated as appropriate: allergies, current medications, past family history, past medical history, past social history, past surgical history and problem list.     Past Medical History:   Diagnosis Date    Acute respiratory insufficiency 03/22/2024    Allergic     Chronic HFrEF (heart failure with reduced ejection fraction) ([facility])     Coronary artery disease     COVID-19 virus infection 11/28/2022    Diabetes mellitus (HCC)     Disease of thyroid gland 4/09/2024    Heart disease 3/24    Hyperlipidemia     Hypoglycemia     ICD (implantable cardioverter-defibrillator) in place     Ischemic cardiomyopathy     Lactic acidosis 03/22/2024    Myocardial infarction (HCC) 3/22/2024    Otitis media 1966    Seasonal allergies     ST elevation myocardial infarction involving left anterior descending (LAD) coronary artery ([facility]) 03/22/2024    Tobacco abuse     Visual impairment 1967       Past Surgical History:   Procedure Laterality Date    ADENOIDECTOMY      APPENDECTOMY      APPENDECTOMY LAPAROSCOPIC N/A 05/08/2024    Procedure: APPENDECTOMY LAPAROSCOPIC;  Surgeon: Lauryn Ullrich, DO;  Location: [facility];  Service: General    BACK SURGERY  8/23    BREAST EXCISIONAL BIOPSY Right 09/2000    cyst removed- benign    CARDIAC CATHETERIZATION N/A 03/22/2024    Procedure: Cardiac pci;  Surgeon: Gbariel Myers MD;  [facility]: [facility];  Service: Cardiology    CARDIAC  "CATHETERIZATION N/A 2024    Procedure: Cardiac Coronary Angiogram;  Surgeon: Gabriel Myers MD;  [facility];  Service: Cardiology    CARDIAC CATHETERIZATION N/A 2024    Procedure: Cardiac PCI AMI;  Surgeon: Gabriel Myers MD;  [facility];  Service: Cardiology    CARDIAC CATHETERIZATION N/A 2024    Procedure: Cardiac IVUS;  Surgeon: Gabriel Myers MD;  [facility];  Service: Cardiology    CARDIAC DEFIBRILLATOR PLACEMENT      CARDIAC ELECTROPHYSIOLOGY PROCEDURE N/A 2024    Procedure: Cardiac icd implant;  Surgeon: Jeffy Lama MD;  [facility];  Service: Cardiology     SECTION      COLONOSCOPY      ECTOPIC PREGNANCY SURGERY      INSERT / REPLACE / REMOVE PACEMAKER      MASS EXCISION Left 10/18/2024    Procedure: EXCISION BIOPSY TISSUE LESION/MASS UPPER EXTREMITY - Left index finger;  Surgeon: Leandro Campos MD;  Location:  MAIN OR;  Service: Orthopedics    SEPTOPLASTY      SPINE SURGERY  23    TONSILLECTOMY         Family History   Adopted: Yes   Problem Relation Age of Onset    Depression Mother     Heart disease Father     OCD Daughter          Medications have been verified.        Objective   Pulse 82   Temp (!) 97.4 °F (36.3 °C)   Resp 20   Ht 5' 8\" (1.727 m)   Wt 98.9 kg (218 lb)   SpO2 98%   BMI 33.15 kg/m²        Physical Exam     Physical Exam  Vitals and nursing note reviewed.   Constitutional:       General: She is not in acute distress.     Appearance: Normal appearance. She is not ill-appearing, toxic-appearing or diaphoretic.      Interventions: She is not intubated.  HENT:      Head: Normocephalic and atraumatic.      Right Ear: External ear normal.      Left Ear: External ear normal.      Nose: Nose normal. No congestion or rhinorrhea.      Mouth/Throat:      Mouth: Mucous membranes are moist.   Eyes:      Extraocular Movements: Extraocular movements intact.      Conjunctiva/sclera: " Conjunctivae normal.      Pupils: Pupils are equal, round, and reactive to light.   Cardiovascular:      Rate and Rhythm: Normal rate and regular rhythm.      Pulses: Normal pulses.      Heart sounds: Normal heart sounds, S1 normal and S2 normal. Heart sounds not distant. No murmur heard.     No friction rub. No gallop.   Pulmonary:      Effort: Pulmonary effort is normal. No tachypnea, bradypnea, accessory muscle usage, prolonged expiration, respiratory distress or retractions. She is not intubated.      Breath sounds: Normal breath sounds. No stridor, decreased air movement or transmitted upper airway sounds. No decreased breath sounds, wheezing, rhonchi or rales.   Abdominal:      General: Abdomen is flat. Bowel sounds are normal.      Palpations: Abdomen is soft.      Tenderness: There is no abdominal tenderness. There is no right CVA tenderness, left CVA tenderness, guarding or rebound.   Musculoskeletal:         General: Normal range of motion.      Cervical back: Normal range of motion and neck supple. No tenderness.   Skin:     General: Skin is warm and dry.      Capillary Refill: Capillary refill takes less than 2 seconds.   Neurological:      General: No focal deficit present.      Mental Status: She is alert and oriented to person, place, and time.      Cranial Nerves: No cranial nerve deficit.   Psychiatric:         Mood and Affect: Mood normal.         Behavior: Behavior normal.                      Adequate: hears normal conversation without difficulty

## 2024-11-06 NOTE — PROGRESS NOTES
General Cardiology Outpatient Visit    Vesna Langston 64 y.o. female   MRN: [id number]  Encounter: [id number]    Assessment:  Patient Active Problem List    Diagnosis Date Noted    Primary osteoarthritis of right knee 10/08/2024    History of torn meniscus of right knee 10/08/2024    Chronic pain of right knee 10/08/2024    Localized edema 10/02/2024    Obstructive sleep apnea (adult) (pediatric) 10/01/2024    POP (obstructive sleep apnea) 09/23/2024    Obesity, morbid ([facility]) 08/27/2024    Ganglion cyst of finger of left hand 08/15/2024    Acute pulmonary embolism without acute cor pulmonale ([facility]) 05/07/2024    History of gastric ulcer 05/07/2024    Generalized anxiety disorder 05/02/2024    Anemia due to acute blood loss 04/19/2024    Acquired hypothyroidism 04/19/2024    Constipation 04/10/2024    Type 2 diabetes mellitus, without long-term current use of insulin ([facility]) 04/07/2024    Syncope 04/01/2024    Guaiac positive stools 04/01/2024    S/P ICD (internal cardiac defibrillator) procedure 03/27/2024    Chronic low back pain 03/25/2024    HFrEF (heart failure with reduced ejection fraction) ([facility]) 03/25/2024    Ischemic cardiomyopathy 03/24/2024    Coronary artery disease involving native coronary artery of native heart 03/23/2024    S/P coronary artery stent placement 03/23/2024    Atrial flutter, paroxysmal ([facility]) 03/22/2024    Hyperlipemia 03/22/2024    New onset type 2 diabetes mellitus ([facility]) 03/22/2024    Tobacco abuse 03/22/2024    History of sustained ventricular tachycardia 03/22/2024    Back pain 07/31/2022    Sciatica of left side 07/31/2022       Today's Plan:  Volume up on exam. Will give IV Lasix (60 mg) x1 in office today.  Starting on 11/7, patient advised to increase Lasix to 40 mg BID.  Stop Jardiance given concern for current UTI. Advised follow-up with PCP and/or urgent care for further evaluation and testing.  RTC in 1 week with BMP.     Plan:  Acute on chronic HFrEF, partially recovered; LVEF  "40-45%   Etiology: ischemic.   [facility] 03/22/2024: received PCI to 100% stenosis of ostial/proximal LAD.   TTE 03/23/2024: LVEF 35%. LVIDd 4.7 cm. RWMA. Normal RV. Moderate MR. Mild TR. Dilated IVC.    cMRI 03/26/2024: LVEF 20%. LVIDd 4.5 cm. \"Transmural scarring of the mid anterior, anteroseptal and inferoseptal walls, the apical anterior, septal and inferior walls and the apex.\"   TTE 10/08/2024: LVEF 40-45%.    Weight of 221 lbs on 10/14 ([facility] office). Today, weighs 223 lbs.    Most recent BMP from 10/28/2024: sodium 135; potassium 4.3; BUN 16; creatinine 1.04; eGFR 56.     Guideline Directed Medical Therapy  --Beta Blocker: metoprolol succinate 50 mg qHS.   --ARNi / ACEi / ARB: Entresto 49-51 mg BID.   --Aldosterone Antagonist: spironolactone 25 mg daily.   --SGLT2 Inhibitor: No, history of UTIs while on SGLT2i.    Volume Management:  --Diuretic: Lasix 40 mg    Sudden Cardiac Death Risk Reduction:  --Medtronic dual chamber ICD in situ since 03/2024 (secondary prevention).     Electrical Resynchronization:  --Candidacy for BiV device: narrow QRS.     Advanced Therapies: Will continue to monitor.    Coronary artery disease   Without active chest pain.   S/p STEMI in 03/2024; received PCI to 100% stenosis of ostial/proximal LAD.   Continues on Plavix, Imdur, statin, and BB as above.    History of monomorphic ventricular tachycardia   Per EP notes, felt to be scar mediated.    S/p secondary prevention ICD.    Continues on amiodarone per EP. BB as above.     Atrial flutter, paroxysmal    [id number] = 5 (age, sex, HF, CAD, DM).   Anticoagulation on Eliquis.    Rate control: BB as above.   Rhythm control: amiodarone per EP.    Hyperlipidemia  Diabetes mellitus, type II  Chronic back pain  History of tobacco abuse    HPI:   Vesna Langston is a 64-year-old woman with a PMH as above who presents to the office for an acute visit.     Contacted on office on 11/05 with 8 lbs weight gain in past week. Acute visit scheduled. "     11/06/2024: Patient presents for acute visit. Reports 8 lbs weight gain over past week. Denies missing medication doses and also denies any dietary indiscretions. With this weight gain, patient also reports LE swelling as well as TAMAYO. Few weeks ago, patient was able to exercise on treadmill for 30+ minutes; this week, will need to exercise for ~5 minutes before having to stop due to SOB.  Also of note, patient reports bilateral flank pain with decreased urine volumes and malodorous urine. She believes she has another UTI.  Reports history of UTIs with Jardiance in the past. Jardiance was recently restarted in 09/2024.  Reports increasing Lasix to 40 mg daily approximately 4 days ago and has not noted any significant increase in urine output or weight loss with higher diuretic dose.    Past Medical History:   Diagnosis Date    Acute respiratory insufficiency 03/22/2024    Allergic     Chronic HFrEF (heart failure with reduced ejection fraction) ([facility])     Coronary artery disease     COVID-19 virus infection 11/28/2022    Diabetes mellitus ([facility])     Disease of thyroid gland 4/09/2024    Heart disease 3/24    Hyperlipidemia     Hypoglycemia     ICD (implantable cardioverter-defibrillator) in place     Ischemic cardiomyopathy     Lactic acidosis 03/22/2024    Myocardial infarction ([facility]) 3/22/2024    Otitis media 1966    Seasonal allergies     ST elevation myocardial infarction involving left anterior descending (LAD) coronary artery ([facility]) 03/22/2024    Tobacco abuse     Visual impairment 1967       Review of Systems   Constitutional:  Positive for unexpected weight change. Negative for activity change, appetite change and fatigue.   Respiratory:  Positive for shortness of breath. Negative for cough and chest tightness.    Cardiovascular:  Positive for leg swelling. Negative for chest pain and palpitations.   Gastrointestinal:  Negative for abdominal distention and abdominal pain.   Genitourinary:  Positive for  difficulty urinating. Negative for decreased urine volume and urgency.   Musculoskeletal:  Positive for back pain.   Neurological:  Negative for dizziness, syncope, weakness and light-headedness.   Psychiatric/Behavioral:  Negative for confusion and sleep disturbance. The patient is not nervous/anxious.        Allergies   Allergen Reactions    Fish-Derived Products - Food Allergy Anaphylaxis     Cannot have all seafood products    Shellfish-Derived Products - Food Allergy Anaphylaxis    Azithromycin Hives and Rash         Current Outpatient Medications:     albuterol (ProAir HFA) 90 mcg/act inhaler, Inhale 2 puffs every 6 (six) hours as needed for wheezing, Disp: 8.5 g, Rfl: 0    apixaban (ELIQUIS) 5 mg, Take 1 tablet (5 mg total) by mouth 2 (two) times a day, Disp: 180 tablet, Rfl: 1    atorvastatin (LIPITOR) 80 mg tablet, TAKE 1 TABLET BY MOUTH EVERY EVENING, Disp: 100 tablet, Rfl: 1    clopidogrel (PLAVIX) 75 mg tablet, Take 1 tablet (75 mg total) by mouth daily, Disp: 30 tablet, Rfl: 5    diphenhydrAMINE (BENADRYL) 25 mg capsule, Take 25 mg by mouth every 6 (six) hours as needed for itching, Disp: , Rfl:     Durolane 60 MG/3ML injection, , Disp: , Rfl:     furosemide (LASIX) 20 mg tablet, Take 2 tablets (40 mg total) by mouth 2 (two) times a day for 5 days, THEN 2 tablets (40 mg total) daily., Disp: 190 tablet, Rfl: 0    glimepiride (AMARYL) 1 mg tablet, Take 1 tablet (1 mg total) by mouth daily with breakfast, Disp: 90 tablet, Rfl: 3    HYDROcodone-acetaminophen (NORCO) 5-325 mg per tablet, Take 1 tablet by mouth every 6 (six) hours as needed for pain for up to 10 doses Max Daily Amount: 4 tablets, Disp: 10 tablet, Rfl: 0    isosorbide mononitrate (IMDUR) 30 mg 24 hr tablet, Take 1 tablet (30 mg total) by mouth daily, Disp: 90 tablet, Rfl: 5    levothyroxine (Synthroid) 75 mcg tablet, Take 1 tablet (75 mcg total) by mouth daily (Patient taking differently: Take 75 mcg by mouth daily Takes at lunch time), Disp:  90 tablet, Rfl: 3    meclizine (ANTIVERT) 25 mg tablet, Take 1 tablet (25 mg total) by mouth every 8 (eight) hours as needed for dizziness, Disp: 30 tablet, Rfl: 0    metoprolol succinate (TOPROL-XL) 25 mg 24 hr tablet, Take 2 tablets (50 mg total) by mouth daily at bedtime, Disp: 180 tablet, Rfl: 5    pantoprazole (PROTONIX) 40 mg tablet, TAKE 1 TABLET BY MOUTH 2 TIMES A DAY BEFORE MEALS., Disp: 60 tablet, Rfl: 3    sacubitril-valsartan (Entresto) 49-51 MG TABS, Take 1 tablet by mouth 2 (two) times a day, Disp: 180 tablet, Rfl: 5    spironolactone (ALDACTONE) 25 mg tablet, Take 1 tablet (25 mg total) by mouth daily, Disp: 90 tablet, Rfl: 5    gabapentin (Neurontin) 100 mg capsule, Take 1 capsule (100 mg total) by mouth 2 (two) times a day (Patient not taking: Reported on 10/29/2024), Disp: , Rfl:   No current facility-administered medications for this visit.    Social History     Socioeconomic History    Marital status:      Spouse name: Not on file    Number of children: 3    Years of education: Not on file    Highest education level: Not on file   Occupational History    Not on file   Tobacco Use    Smoking status: Former     Current packs/day: 0.00     Average packs/day: 1 pack/day for 24.2 years (24.2 ttl pk-yrs)     Types: Cigarettes     Start date: 2000     Quit date: 3/22/2024     Years since quittin.6     Passive exposure: Never    Smokeless tobacco: Never    Tobacco comments:     Smoked 0.5-1 ppd; quit in 2024.    Vaping Use    Vaping status: Never Used   Substance and Sexual Activity    Alcohol use: Not Currently     Alcohol/week: 1.0 standard drink of alcohol     Types: 1 Shots of liquor per week     Comment: Every 2or 3months    Drug use: Never    Sexual activity: Not on file   Other Topics Concern    Not on file   Social History Narrative    Not on file     Social Determinants of Health     Financial Resource Strain: Low Risk  (2023)    Received from [facility],  "[facility]    Overall Financial Resource Strain (CARDIA)     Difficulty of Paying Living Expenses: Not hard at all   Food Insecurity: No Food Insecurity (9/13/2024)    Nursing - Inadequate Food Risk Classification     Worried About Running Out of Food in the Last Year: Never true     Ran Out of Food in the Last Year: Never true     Ran Out of Food in the Last Year: Not on file   Transportation Needs: No Transportation Needs (9/13/2024)    PRAPARE - Transportation     Lack of Transportation (Medical): No     Lack of Transportation (Non-Medical): No   Physical Activity: Not on file   Stress: No Stress Concern Present (2/20/2023)    [facility]    [employer] - Occupational Stress Questionnaire     Feeling of Stress : Not at all   Social Connections: Unknown (6/18/2024)    Received from "[facility]    Social Connections     How often do you feel lonely or isolated from those around you? (Adult - for ages 18 years and over): Not on file   Intimate Partner Violence: Not At Risk (2/20/2023)    [facility]    Humiliation, Afraid, Rape, and Kick questionnaire     Fear of Current or Ex-Partner: No     Emotionally Abused: No     Physically Abused: No     Sexually Abused: No   Housing Stability: High Risk (9/13/2024)    Housing Stability Vital Sign     Unable to Pay for Housing in the Last Year: No     Number of Times Moved in the Last Year: 2     Homeless in the Last Year: No     Family History   Adopted: Yes   Problem Relation Age of Onset    Depression Mother     Heart disease Father     OCD Daughter        Vitals:   Blood pressure 100/68, pulse 71, height 5' 8\" (1.727 m), weight 102 kg (223 lb 14.4 oz), SpO2 97%.    Wt Readings from Last 10 Encounters:   11/06/24 102 kg (223 lb 14.4 oz)   11/05/24 103 kg (227 lb 9.6 oz)   11/01/24 102 kg (225 lb 9.6 oz) " "  10/30/24 99.8 kg (220 lb)   10/29/24 99.8 kg (220 lb)   10/28/24 99.8 kg (220 lb)   10/17/24 99.3 kg (219 lb)   10/16/24 99.8 kg (220 lb)   10/15/24 100 kg (220 lb 12.8 oz)   10/14/24 100 kg (221 lb)     Vitals:    11/06/24 1026   BP: 100/68   BP Location: Right arm   Patient Position: Sitting   Cuff Size: Large   Pulse: 71   SpO2: 97%   Weight: 102 kg (223 lb 14.4 oz)   Height: 5' 8\" (1.727 m)       Physical Exam  Vitals reviewed.   Constitutional:       General: She is awake. She is not in acute distress.     Appearance: Normal appearance. She is well-developed and overweight. She is not toxic-appearing or diaphoretic.   HENT:      Head: Normocephalic.      Nose: Nose normal.      Mouth/Throat:      Mouth: Mucous membranes are moist.   Eyes:      General: No scleral icterus.     Conjunctiva/sclera: Conjunctivae normal.   Neck:      Vascular: JVD present.   Cardiovascular:      Rate and Rhythm: Normal rate and regular rhythm.      Heart sounds: Murmur heard.   Pulmonary:      Effort: Pulmonary effort is normal. No tachypnea, bradypnea or respiratory distress.      Breath sounds: Normal air entry. No decreased air movement.   Abdominal:      General: There is distension (mild).   Musculoskeletal:      Cervical back: Neck supple.      Right lower leg: Edema present.      Left lower leg: Edema present.   Skin:     General: Skin is warm and dry.      Coloration: Skin is not cyanotic or jaundiced.   Neurological:      General: No focal deficit present.      Mental Status: She is alert and oriented to person, place, and time.   Psychiatric:         Attention and Perception: Attention normal.         Mood and Affect: Affect normal. Mood is anxious.         Speech: Speech normal.         Behavior: Behavior normal. Behavior is cooperative.         Thought Content: Thought content normal.         Labs & Results:  Lab Results   Component Value Date    WBC 9.11 10/28/2024    HGB 11.4 (L) 10/28/2024    HCT 36.9 10/28/2024    " MCV 86 10/28/2024     10/28/2024     Lab Results   Component Value Date    SODIUM 135 10/28/2024    K 4.3 10/28/2024     10/28/2024    CO2 24 10/28/2024    BUN 16 10/28/2024    CREATININE 1.04 10/28/2024    GLUC 112 10/28/2024    CALCIUM 9.0 10/28/2024     Lab Results   Component Value Date    INR 1.08 08/25/2024    INR 1.08 05/07/2024    INR 1.13 04/07/2024    PROTIME 14.4 08/25/2024    PROTIME 14.6 (H) 05/07/2024    PROTIME 15.2 (H) 04/07/2024     Lab Results   Component Value Date    BNP 82 09/09/2024      Eunice Owen PA-C

## 2024-11-06 NOTE — PATIENT INSTRUCTIONS
Urine dip grossly normal in office, urine culture results pending.   Continue to closely monitor symptoms, go to emergency department for fever or flank pain.

## 2024-11-06 NOTE — PATIENT INSTRUCTIONS
You received IV Lasix in the office today.   Our nurse will call you tomorrow to see how you're doing.   Starting tomorrow AM, begin taking Lasix 40 mg twice daily until weights back down.  Have blood work completed before office visit next week.  We will see you again in the office early next week.    Hold Jardiance.     Please weigh yourself every day (after emptying your bladder) and keep a detailed log of weights.   Contact the Heart Failure program at [phone] if you gain 3+ lbs overnight or 5+ lbs in 5-7 days.  Limit daily sodium/salt intake to 2000 mg daily to prevent fluid retention.  Avoid canned foods, fast food/Chinese food, and processed meats (hot dogs, lunch meat, and sausage etc.). Caution with condiments.  Limit fluid intake to 2000 mL or 2 liters (about 60-65 ounces) daily.  Avoid electrolyte replacement drinks (such as Gatorade, Pedialyte, Propel, Liquid IV, etc.).  Bring complete list of medications and log of daily weights to your follow-up appointment.     Complex Repair And Rhombic Flap Text: The defect edges were debeveled with a #15 scalpel blade.  The primary defect was closed partially with a complex linear closure.  Given the location of the remaining defect, shape of the defect and the proximity to free margins a rhombic flap was deemed most appropriate for complete closure of the defect.  Using a sterile surgical marker, an appropriate advancement flap was drawn incorporating the defect and placing the expected incisions within the relaxed skin tension lines where possible. The area thus outlined was incised deep to adipose tissue with a #15 scalpel blade. The skin margins were undermined to an appropriate distance in all directions utilizing iris scissors and carried over to close the primary defect.

## 2024-11-06 NOTE — PROGRESS NOTES
60 mg Iv lasix in Right hand.  IV d/c'd cath intact.    UO- 400 ml yellow clear     BP- 124/64    Pt is following low sodium diet, reading food labels. Following fluid restriction.  Pt is aware I will call her tomorrow for update.

## 2024-11-07 ENCOUNTER — TELEPHONE (OUTPATIENT)
Dept: CARDIOLOGY CLINIC | Facility: CLINIC | Age: 64
End: 2024-11-07

## 2024-11-07 LAB — BACTERIA UR CULT: NORMAL

## 2024-11-07 NOTE — TELEPHONE ENCOUNTER
Spoke with pt. Wt down today at 217 lbs. She feels much better. Breathing and edema improved. She is going to work out today.    She will have F/u on Monday with Dr. Bell. Will get her bmp done.    Went to urgent care and urine culture in process. Urinating more.    Advised to call the office with rapid wt gain and / or worsening of CHF symptoms.  Verbally understood.    Thank you

## 2024-11-08 NOTE — TELEPHONE ENCOUNTER
11/8/24 3RD ATTEMPT: CALLED PT AND LMOM FOR HER TO CB TO DISCUSS NEED FOR APPT, NOT SURE IF PT IS WANTING [facility] OR [facility].

## 2024-11-09 ENCOUNTER — APPOINTMENT (OUTPATIENT)
Dept: LAB | Facility: CLINIC | Age: 64
End: 2024-11-09
Payer: COMMERCIAL

## 2024-11-09 DIAGNOSIS — I50.22 HEART FAILURE WITH MID-RANGE EJECTION FRACTION (HFMEF) (HCC): ICD-10-CM

## 2024-11-09 LAB
ANION GAP SERPL CALCULATED.3IONS-SCNC: 9 MMOL/L (ref 4–13)
BUN SERPL-MCNC: 26 MG/DL (ref 5–25)
CALCIUM SERPL-MCNC: 9.4 MG/DL (ref 8.4–10.2)
CHLORIDE SERPL-SCNC: 100 MMOL/L (ref 96–108)
CO2 SERPL-SCNC: 26 MMOL/L (ref 21–32)
CREAT SERPL-MCNC: 1.29 MG/DL (ref 0.6–1.3)
GFR SERPL CREATININE-BSD FRML MDRD: 43 ML/MIN/1.73SQ M
GLUCOSE SERPL-MCNC: 140 MG/DL (ref 65–140)
POTASSIUM SERPL-SCNC: 4.1 MMOL/L (ref 3.5–5.3)
SODIUM SERPL-SCNC: 135 MMOL/L (ref 135–147)

## 2024-11-09 PROCEDURE — 36415 COLL VENOUS BLD VENIPUNCTURE: CPT

## 2024-11-09 PROCEDURE — 80048 BASIC METABOLIC PNL TOTAL CA: CPT

## 2024-11-11 ENCOUNTER — OFFICE VISIT (OUTPATIENT)
Dept: CARDIOLOGY CLINIC | Facility: CLINIC | Age: 64
End: 2024-11-11
Payer: COMMERCIAL

## 2024-11-11 VITALS
HEART RATE: 67 BPM | WEIGHT: 217.4 LBS | DIASTOLIC BLOOD PRESSURE: 62 MMHG | SYSTOLIC BLOOD PRESSURE: 100 MMHG | OXYGEN SATURATION: 98 % | HEIGHT: 68 IN | BODY MASS INDEX: 32.95 KG/M2

## 2024-11-11 DIAGNOSIS — I50.22 CHRONIC HFREF (HEART FAILURE WITH REDUCED EJECTION FRACTION) (HCC): Primary | ICD-10-CM

## 2024-11-11 DIAGNOSIS — R82.90 MALODOROUS URINE: ICD-10-CM

## 2024-11-11 DIAGNOSIS — I25.10 CORONARY ARTERY DISEASE INVOLVING NATIVE CORONARY ARTERY OF NATIVE HEART WITHOUT ANGINA PECTORIS: ICD-10-CM

## 2024-11-11 PROCEDURE — 99214 OFFICE O/P EST MOD 30 MIN: CPT | Performed by: STUDENT IN AN ORGANIZED HEALTH CARE EDUCATION/TRAINING PROGRAM

## 2024-11-11 NOTE — PROGRESS NOTES
"Advanced Heart Failure/Pulmonary Hypertension Outpatient Note - Vesna Langston 64 y.o. female MRN: [id number]    @ Encounter: [id number]    Assessment:  64 y.o. female PMH and acute problems listed later in this note (a partial list may also be included within 'assessment' section) p/w HF fu.  I first met Vesna Langston on 5/6/24.    Primarily ICM, HFimpEF, LVEF 30-35%>PCI+gdmt>40-45% 10/2024 TTE, nondilated LV  LHC 03/22/2024: s/p PCI to 100% stenosis of ostial/proximal LAD. No other residual dz elsewhere.  cMRI 03/26/2024: LVEF 20%. LVIDd 4.5 cm. \"Transmural scarring of the mid anterior, anteroseptal and inferoseptal walls, the apical anterior, septal and inferior walls and the apex.\"   Coronary artery disease  S/p MI in 03/2024; received PCI to 100% stenosis of ostial/proximal LAD.  History of monomorphic ventricular tachycardia              Per EP notes, felt to be scar mediated.               S/p secondary prevention ICD.               Continues on amiodarone per EP.   Atrial flutter, paroxysmal               Anticoagulation on Eliquis.               Rhythm control: amiodarone per EP.  PE 5/2024. CTA: Single subsegmental right lower lobe pulmonary embolus as shown on abdominal CT.   Hyperlipidemia  Diabetes mellitus, type II  Chronic back pain  History of tobacco abuse  4/7/24 CT: IMPRESSION:  Suspected small hemorrhage from the anterior wall of the distal gastric body with focal wall thickening. Bleeding due to underlying lesion or PUD is suspected. Recommend endoscopic correlation.  Past heavy NSAID use, 2024 stopped  Lung and spleen granulomas.  Works in [employer], no heavy lifting she says      I have reviewed all pertinent patient data including but not limited to:        Lab Units 11/09/24  0729 10/28/24  1728 09/10/24  0706   CREATININE mg/dL 1.29 1.04 0.98     Results from last 7 days   Lab Units 11/09/24  0729   CREATININE mg/dL 1.29       Lab Results   Component Value Date    K 4.1 11/09/2024     Lab Results " "  Component Value Date    HGBA1C 7.2 (H) 09/27/2024     Lab Results   Component Value Date    UIN4UHPORMJQ 5.566 (H) 09/27/2024     Lab Results   Component Value Date    LDLCALC 103 (H) 09/27/2024     Lab Results   Component Value Date    BNP 82 09/09/2024      No results found for: \"NTBNP\"       Home scale dry weight 218lbs  When decompensated she has been 227lbs    TODAY'S PLAN:     11/11/24  Warm, euvolemic  No new cardiac complaints, feels generally well - after 10 lbs diuresis  Weight back down 227>218lbs home scale after escalated diuresis  Repeat BMP was acceptable recently    Recent HF AP visit:  \"? Volume up on exam. Will give IV Lasix (60 mg) x1 in office today.  ? Starting on 11/7, patient advised to increase Lasix to 40 mg BID.  ? Stop Jardiance given concern for current UTI. Advised follow-up with PCP and/or urgent care for further evaluation and testing.  ? RTC in 1 week with BMP.\"    UA was neg  She has ongoing dysuria and has had yeast infx in past; she will fu PCP for this  She will remain off sglt2i for now    Planned for amio switch to tikosyn per EP later this month    Complete 5 day burst of lasix 40 bid tomorrow, then reduce to new maintenance dose 40AM and 20PM  Long discussion about evidence of worsening HF, when to self uptitrate home diuretic and call cardiology office    Now seeing nutritionist, no longer doing extreme caloric restriction to 800 cals per day, as she was in recent past  Hydrating better    Gdmt below  Historically Limited by hypotension though has had some room  Has ICD for VT  No MR    Cw imdur 30 qd    On AC+plavix; ideally no pause until 3/2025 for any non-urgent surgical reason  On high intens statin    Deranged Thyroids and DM per PCP    She mentions multiple possible upcoming surgeries in 2025 for which she will need pre op risk stratification at appropriate time    Follow up:  With me in 3 months after echo, or sooner if symptoms evolve  In addition to follow up with " their other medical providers    Key info from my prior notes:    She has been consuming only 800 calories a day for 1 month or longer in an extreme attempt to lose weight > advised against this. Nutrition referral given today.    Trial imdur for any component of angina  Then 1 week later up entresto and fu BMP afterwards; this will also help with volume management  Cw lasix 20 qd standing for now, which is recent increase from prior    Warm, euvolemic  Has increased lasix 20 prn to qdaily standing for past 1 week for increased weight, mild sob, mild edema of feet - no major improvement.  Today is last minute urgent visit for ED visit 8/25 for CP in setting of home  transiently, ACS ruled out. Then 8/26 yesterday had episode of vomiting, faintness, no LOC, no chest pain, has felt better since then. No ICD shocks, no palpitations. Did not improve with meclizine. Normal-higher blood sugars at home recently/during events.  Very rare opioid use she says for her back pain issues, none since 1-2 weeks ago    Follows GI    Discussed therapeutic lifestyle changes, low-moderate intensity exercise, maintain acceptable hydration 64 oz water daily, salt restrict, Mediterranean vs DASH diet, weight loss  Avoid nsaids    Further review of return to work next week  Safest plan would be no driving x 3 mo - resume 6/2024 if remains stable    Pharmacotherapies / Neurohormonal Blockade:  --Beta Blocker: metoprolol succinate 50 qd  --ARNi / ACEi / ARB: entresto 49/51 bid  --Aldosterone Antagonist: aldactone 25 qd  --SGLT2 Inhibitor: jardiance 25 qd for HF and DM > subsequently Sglt2i stopped in early 2024 2/2 yeast infx>9/10/24 Re-attempt lower sglt2i dose now, jardiance 10 qd, long discussion risks vs benefits and this is pt wish> DC 11/2024, plan above    --Diuretic: plan above     Sudden Cardiac Death Risk Reduction:  --Medtronic dual chamber ICD in situ since 03/2024 (secondary prevention).   11/2024  MDT DUAL ICD (MVP ON) /  ACTIVE SYSTEM IS MRI CONDITIONAL   CARELINK TRANSMISSION: BATTERY VOLTAGE ADEQUATE. (12.3 YRS) AP 7%  1%. ALL AVAILABLE LEAD PARAMETERS WITHIN NORMAL LIMITS. NO SIGNIFICANT HIGH RATE EPISODES. OPTI-VOL WITHIN NORMAL LIMITS. NORMAL DEVICE FUNCTION.   Electrical Resynchronization:  --Candidacy for BiV device: narrow QRS.      Advanced Therapies: Will continue to monitor.    Studies:  I have reviewed all pertinent patient data/labs/imaging where available, including but not limited to the below studies. Selected results may be displayed here but comprehensive listing is omitted for note clarity and can be found in the epic chart.    ECG.    Echo.    Stress.    Cath.    HPI:   64 y.o. female PMH and acute problems listed later in this note (a partial list may also be included within 'assessment' section) p/w HF fu.  I first met Vesna Langston on 5/6/24.  No new CP/SOB/dizziness/palpitations/syncope.  No new fatigue.  No new unintentional weight changes.  No new leg swelling, PND, pillow orthopnea.  No new fevers, chills, cough, nausea, vomiting, diarrhea, dysuria.      Interval History:  As noted in 'plan' section above and prior epic chart notes.    No new CP/SOB/dizziness/palpitations/syncope.  No new fatigue.  No new unintentional weight changes.  No new leg swelling, PND, pillow orthopnea.  No new fevers, chills, cough, nausea, vomiting, diarrhea, dysuria.    Past Medical History:   Diagnosis Date    Acute respiratory insufficiency 03/22/2024    Allergic     Chronic HFrEF (heart failure with reduced ejection fraction) (HCC)     Coronary artery disease     COVID-19 virus infection 11/28/2022    Diabetes mellitus (HCC)     Disease of thyroid gland 4/09/2024    Heart disease 3/24    Hyperlipidemia     Hypoglycemia     ICD (implantable cardioverter-defibrillator) in place     Ischemic cardiomyopathy     Lactic acidosis 03/22/2024    Myocardial infarction (HCC) 3/22/2024    Otitis media 1966    Seasonal allergies     ST  elevation myocardial infarction involving left anterior descending (LAD) coronary artery ([facility]) 03/22/2024    Tobacco abuse     Visual impairment 1967     Patient Active Problem List    Diagnosis Date Noted    Primary osteoarthritis of right knee 10/08/2024    History of torn meniscus of right knee 10/08/2024    Chronic pain of right knee 10/08/2024    Localized edema 10/02/2024    Obstructive sleep apnea (adult) (pediatric) 10/01/2024    POP (obstructive sleep apnea) 09/23/2024    Obesity, morbid ([facility]) 08/27/2024    Ganglion cyst of finger of left hand 08/15/2024    Acute pulmonary embolism without acute cor pulmonale ([facility]) 05/07/2024    History of gastric ulcer 05/07/2024    Generalized anxiety disorder 05/02/2024    Anemia due to acute blood loss 04/19/2024    Acquired hypothyroidism 04/19/2024    Constipation 04/10/2024    Type 2 diabetes mellitus, without long-term current use of insulin ([facility]) 04/07/2024    Syncope 04/01/2024    Guaiac positive stools 04/01/2024    S/P ICD (internal cardiac defibrillator) procedure 03/27/2024    Chronic low back pain 03/25/2024    HFrEF (heart failure with reduced ejection fraction) ([facility]) 03/25/2024    Ischemic cardiomyopathy 03/24/2024    Coronary artery disease involving native coronary artery of native heart 03/23/2024    S/P coronary artery stent placement 03/23/2024    Atrial flutter, paroxysmal ([facility]) 03/22/2024    Hyperlipemia 03/22/2024    New onset type 2 diabetes mellitus ([facility]) 03/22/2024    Tobacco abuse 03/22/2024    History of sustained ventricular tachycardia 03/22/2024    Back pain 07/31/2022    Sciatica of left side 07/31/2022       ROS:  10 point ROS negative except as specified in HPI/interval history    Allergies   Allergen Reactions    Fish-Derived Products - Food Allergy Anaphylaxis     Cannot have all seafood products    Shellfish-Derived Products - Food Allergy Anaphylaxis    Azithromycin Hives and Rash     Current Outpatient Medications   Medication  Instructions    albuterol (ProAir HFA) 90 mcg/act inhaler 2 puffs, Inhalation, Every 6 hours PRN    apixaban (ELIQUIS) 5 mg, Oral, 2 times daily    atorvastatin (LIPITOR) 80 mg, Oral, Every evening    clopidogrel (PLAVIX) 75 mg, Oral, Daily    diphenhydrAMINE (BENADRYL) 25 mg, Oral, Every 6 hours PRN    Durolane 60 MG/3ML injection     furosemide (LASIX) 20 mg tablet Take 2 tablets (40 mg total) by mouth 2 (two) times a day for 5 days, THEN 2 tablets (40 mg total) daily.    gabapentin (NEURONTIN) 100 mg, Oral, 2 times daily    glimepiride (AMARYL) 1 mg, Oral, Daily with breakfast    HYDROcodone-acetaminophen (NORCO) 5-325 mg per tablet 1 tablet, Oral, Every 6 hours PRN    isosorbide mononitrate (IMDUR) 30 mg, Oral, Daily    levothyroxine (SYNTHROID) 75 mcg, Oral, Daily    meclizine (ANTIVERT) 25 mg, Oral, Every 8 hours PRN    metoprolol succinate (TOPROL-XL) 50 mg, Oral, Daily at bedtime    pantoprazole (PROTONIX) 40 mg, Oral, 2 times daily before meals    sacubitril-valsartan (Entresto) 49-51 MG TABS 1 tablet, Oral, 2 times daily    spironolactone (ALDACTONE) 25 mg, Oral, Daily      Social History     Socioeconomic History    Marital status:      Spouse name: Not on file    Number of children: 3    Years of education: Not on file    Highest education level: Not on file   Occupational History    Not on file   Tobacco Use    Smoking status: Former     Current packs/day: 0.00     Average packs/day: 1 pack/day for 24.2 years (24.2 ttl pk-yrs)     Types: Cigarettes     Start date: 2000     Quit date: 3/22/2024     Years since quittin.6     Passive exposure: Never    Smokeless tobacco: Never    Tobacco comments:     Smoked 0.5-1 ppd; quit in 2024.    Vaping Use    Vaping status: Never Used   Substance and Sexual Activity    Alcohol use: Not Currently     Alcohol/week: 1.0 standard drink of alcohol     Types: 1 Shots of liquor per week     Comment: Every 2or 3months    Drug use: Never    Sexual activity:  Not on file   Other Topics Concern    Not on file   Social History Narrative    Not on file     Social Determinants of Health     Financial Resource Strain: Low Risk  (2/20/2023)    [facility]    Overall Financial Resource Strain (CARDIA)     Difficulty of Paying Living Expenses: Not hard at all   Food Insecurity: No Food Insecurity (9/13/2024)    Nursing - Inadequate Food Risk Classification     Worried About Running Out of Food in the Last Year: Never true     Ran Out of Food in the Last Year: Never true     Ran Out of Food in the Last Year: Not on file   Transportation Needs: No Transportation Needs (9/13/2024)    PRAPARE - Transportation     Lack of Transportation (Medical): No     Lack of Transportation (Non-Medical): No   Physical Activity: Not on file   Stress: No Stress Concern Present (2/20/2023)    Received from [facility], [facility] of Occupational Health - Occupational Stress Questionnaire     Feeling of Stress : Not at all   Social Connections: Unknown (6/18/2024)    Received from [facility]    Social Connections     How often do you feel lonely or isolated from those around you? (Adult - for ages 18 years and over): Not on file   Intimate Partner Violence: Not At Risk (2/20/2023)    Received from [facility], [facility]    Humiliation, Afraid, Rape, and Kick questionnaire     Fear of Current or Ex-Partner: No     Emotionally Abused: No     Physically Abused: No     Sexually Abused: No   Housing Stability: High Risk (9/13/2024)    Housing Stability Vital Sign     Unable to Pay for Housing in the Last Year: No     Number of Times Moved in the Last Year: 2     Homeless in the Last Year: No     Family History   Adopted: Yes   Problem Relation Age of Onset    Depression Mother     Heart disease Father     OCD Daughter        Physical Exam:  Vitals:     "11/11/24 1322   BP: 100/62   BP Location: Left arm   Patient Position: Sitting   Cuff Size: Standard   Pulse: 67   SpO2: 98%   Weight: 98.6 kg (217 lb 6.4 oz)   Height: 5' 8\" (1.727 m)     Constitutional: NAD, non toxic  Ears/nose/mouth/throat: atraumatic  CV: RRR, nl S1S2, no murmurs/rubs/gallups, no JVD, no HJR  Resp: CTABL  GI: Soft, NTND  MSK: no swollen joints in exposed areas  Extr: No edema, warm LE  Pysche: Normal affect  Neuro: appropriate in conversation  Skin: dry and intact in exposed areas    Labs & Results:  Lab Results   Component Value Date    SODIUM 135 11/09/2024    K 4.1 11/09/2024     11/09/2024    CO2 26 11/09/2024    BUN 26 (H) 11/09/2024    CREATININE 1.29 11/09/2024    GLUC 140 11/09/2024    CALCIUM 9.4 11/09/2024     Lab Results   Component Value Date    WBC 9.11 10/28/2024    HGB 11.4 (L) 10/28/2024    HCT 36.9 10/28/2024    MCV 86 10/28/2024     10/28/2024       Counseling / Coordination of Care  Greater than 50% of total time was spent with the patient and / or family counseling and / or coordination of care.  We discussed diagnoses, most recent studies, tests and any changes in treatment plan.    Thank you for the opportunity to participate in the care of this patient.    Vitor Bell MD  Attending Physician  Advanced Heart Failure and Transplant Cardiology  [facility]  "

## 2024-11-13 ENCOUNTER — TELEPHONE (OUTPATIENT)
Dept: CARDIOLOGY CLINIC | Facility: CLINIC | Age: 64
End: 2024-11-13

## 2024-11-13 DIAGNOSIS — K25.9 MULTIPLE GASTRIC ULCERS: ICD-10-CM

## 2024-11-13 RX ORDER — PANTOPRAZOLE SODIUM 40 MG/1
40 TABLET, DELAYED RELEASE ORAL
Qty: 60 TABLET | Refills: 5 | Status: SHIPPED | OUTPATIENT
Start: 2024-11-13 | End: 2024-11-15 | Stop reason: SDUPTHER

## 2024-11-13 NOTE — TELEPHONE ENCOUNTER
Patient called the RX Refill Line. Message is being forwarded to the office.     Patient is requesting an updated script for her lasix. She stated that you in creased the dosage during your last appointment.    Please contact patient at   [phone]

## 2024-11-13 NOTE — TELEPHONE ENCOUNTER
Patient called to request a refill for their pantoprazole (PROTONIX) 40 mg tablet. Advised a refill was requested on 11/13/2024 and is pending approval. Patient verbalized understanding and is in agreement.

## 2024-11-15 ENCOUNTER — OFFICE VISIT (OUTPATIENT)
Dept: FAMILY MEDICINE CLINIC | Facility: CLINIC | Age: 64
End: 2024-11-15
Payer: COMMERCIAL

## 2024-11-15 VITALS
OXYGEN SATURATION: 98 % | DIASTOLIC BLOOD PRESSURE: 61 MMHG | SYSTOLIC BLOOD PRESSURE: 120 MMHG | HEART RATE: 71 BPM | BODY MASS INDEX: 34.39 KG/M2 | WEIGHT: 226.19 LBS

## 2024-11-15 DIAGNOSIS — F17.210 SMOKING GREATER THAN 25 PACK YEARS: ICD-10-CM

## 2024-11-15 DIAGNOSIS — K25.9 MULTIPLE GASTRIC ULCERS: ICD-10-CM

## 2024-11-15 DIAGNOSIS — E03.9 ACQUIRED HYPOTHYROIDISM: ICD-10-CM

## 2024-11-15 DIAGNOSIS — I25.10 CORONARY ARTERY DISEASE INVOLVING NATIVE CORONARY ARTERY OF NATIVE HEART WITHOUT ANGINA PECTORIS: Chronic | ICD-10-CM

## 2024-11-15 DIAGNOSIS — R30.0 DYSURIA: ICD-10-CM

## 2024-11-15 DIAGNOSIS — I50.20 HFREF (HEART FAILURE WITH REDUCED EJECTION FRACTION) (HCC): ICD-10-CM

## 2024-11-15 DIAGNOSIS — Z87.11 HISTORY OF GASTRIC ULCER: ICD-10-CM

## 2024-11-15 DIAGNOSIS — E11.9 TYPE 2 DIABETES MELLITUS WITHOUT COMPLICATION, WITHOUT LONG-TERM CURRENT USE OF INSULIN (HCC): ICD-10-CM

## 2024-11-15 DIAGNOSIS — Z00.00 ANNUAL PHYSICAL EXAM: Primary | ICD-10-CM

## 2024-11-15 DIAGNOSIS — I50.22 HEART FAILURE WITH MID-RANGE EJECTION FRACTION (HFMEF) (HCC): ICD-10-CM

## 2024-11-15 DIAGNOSIS — Z87.891 HISTORY OF TOBACCO ABUSE: ICD-10-CM

## 2024-11-15 DIAGNOSIS — E66.811 CLASS 1 OBESITY DUE TO EXCESS CALORIES WITH SERIOUS COMORBIDITY AND BODY MASS INDEX (BMI) OF 34.0 TO 34.9 IN ADULT: ICD-10-CM

## 2024-11-15 DIAGNOSIS — E66.09 CLASS 1 OBESITY DUE TO EXCESS CALORIES WITH SERIOUS COMORBIDITY AND BODY MASS INDEX (BMI) OF 34.0 TO 34.9 IN ADULT: ICD-10-CM

## 2024-11-15 DIAGNOSIS — E78.2 MIXED HYPERLIPIDEMIA: ICD-10-CM

## 2024-11-15 DIAGNOSIS — M54.32 SCIATICA OF LEFT SIDE: ICD-10-CM

## 2024-11-15 DIAGNOSIS — G47.33 OSA (OBSTRUCTIVE SLEEP APNEA): ICD-10-CM

## 2024-11-15 DIAGNOSIS — I48.92 ATRIAL FLUTTER, PAROXYSMAL (HCC): Chronic | ICD-10-CM

## 2024-11-15 DIAGNOSIS — M17.11 PRIMARY OSTEOARTHRITIS OF RIGHT KNEE: ICD-10-CM

## 2024-11-15 PROBLEM — Z86.711 HISTORY OF PULMONARY EMBOLISM: Status: ACTIVE | Noted: 2024-05-07

## 2024-11-15 PROBLEM — R19.5 GUAIAC POSITIVE STOOLS: Status: RESOLVED | Noted: 2024-04-01 | Resolved: 2024-11-15

## 2024-11-15 LAB
SL AMB  POCT GLUCOSE, UA: NORMAL
SL AMB LEUKOCYTE ESTERASE,UA: NORMAL
SL AMB POCT BILIRUBIN,UA: NORMAL
SL AMB POCT BLOOD,UA: NORMAL
SL AMB POCT CLARITY,UA: CLEAR
SL AMB POCT COLOR,UA: YELLOW
SL AMB POCT KETONES,UA: NORMAL
SL AMB POCT NITRITE,UA: NORMAL
SL AMB POCT PH,UA: 5
SL AMB POCT SPECIFIC GRAVITY,UA: 1.01
SL AMB POCT URINE PROTEIN: NORMAL
SL AMB POCT UROBILINOGEN: 0.2

## 2024-11-15 PROCEDURE — 99214 OFFICE O/P EST MOD 30 MIN: CPT | Performed by: FAMILY MEDICINE

## 2024-11-15 PROCEDURE — 81002 URINALYSIS NONAUTO W/O SCOPE: CPT | Performed by: FAMILY MEDICINE

## 2024-11-15 PROCEDURE — 99397 PER PM REEVAL EST PAT 65+ YR: CPT | Performed by: FAMILY MEDICINE

## 2024-11-15 RX ORDER — PANTOPRAZOLE SODIUM 40 MG/1
40 TABLET, DELAYED RELEASE ORAL
Qty: 60 TABLET | Refills: 5 | Status: SHIPPED | OUTPATIENT
Start: 2024-11-15

## 2024-11-15 NOTE — ASSESSMENT & PLAN NOTE
[facility] following [facility], Dr. Sanchez  Continue management per specialist

## 2024-11-15 NOTE — ASSESSMENT & PLAN NOTE
Wt Readings from Last 3 Encounters:   11/15/24 103 kg (226 lb 3.1 oz)   11/11/24 98.6 kg (217 lb 6.4 oz)   11/06/24 98.9 kg (218 lb)     Recent echocardiogram showing improvement in ejection fraction, currently 40 to 45%  Following closely with cardiology at [facility]  Medications: Entresto, metoprolol XL, atorvastatin, spironolactone, Imdur, Lasix  Continue to follow-up cardiology recommendations

## 2024-11-15 NOTE — ASSESSMENT & PLAN NOTE
Lab Results   Component Value Date    HGBA1C 7.2 (H) 09/27/2024     Chronic, near goal of A1C <7.0  Continue glimepiride 1 mg daily  Continue with healthy lifestyle changes  Due for labs at the end of December, ordered today  On ARB, statin  Diabetic eye exam up-to-date, completes these in [place], Dr. Caceres  Diabetic foot exam up-to-date  Will follow-up in 4 months    Orders:    Lipid panel; Future    Hemoglobin A1C; Future    CBC and differential; Future    Basic metabolic panel; Future    TSH, 3rd generation with Free T4 reflex; Future

## 2024-11-15 NOTE — ASSESSMENT & PLAN NOTE
Lab Results   Component Value Date    HGBA1C 7.2 (H) 09/27/2024       Orders:    Lipid panel; Future    Hemoglobin A1C; Future    CBC and differential; Future    Basic metabolic panel; Future    TSH, 3rd generation with Free T4 reflex; Future

## 2024-11-15 NOTE — ASSESSMENT & PLAN NOTE
Patient follows closely with sleep medicine at [facility] -based on recent sleep study, recommending CPAP therapy

## 2024-11-15 NOTE — PROGRESS NOTES
Adult Annual Physical  Name: Vesna Langston      : 1960      MRN: [id number]  Encounter Provider: Melissa Eldridge DO  Encounter Date: 11/15/2024   Encounter department: [facility] PRIMARY CARE    Assessment & Plan  Annual physical exam         Type 2 diabetes mellitus without complication, without long-term current use of insulin (HCC)    Lab Results   Component Value Date    HGBA1C 7.2 (H) 2024     Chronic, near goal of A1C <7.0  Continue glimepiride 1 mg daily  Continue with healthy lifestyle changes  Due for labs at the end of December, ordered today  On ARB, statin  Diabetic eye exam up-to-date, completes these in [place], Dr. Caceres  Diabetic foot exam up-to-date  Will follow-up in 4 months    Orders:    Lipid panel; Future    Hemoglobin A1C; Future    CBC and differential; Future    Basic metabolic panel; Future    TSH, 3rd generation with Free T4 reflex; Future    Coronary artery disease involving native coronary artery of native heart without angina pectoris  Status post stent placement  Follows with cardiology, Dr. Bell with [facility] cardiology  Medications: Entresto, metoprolol XL, atorvastatin, Plavix  Continue to follow-up cardiology recommendations       Atrial flutter, paroxysmal (HCC)  Status post pacemaker placement, follows with [facility] cardiology and electrophysiology  Rate control: Metoprolol succinate 50 mg daily  AC: Eliquis 5 mg twice daily  Continue to follow-up cardiology recommendations       HFrEF (heart failure with reduced ejection fraction) (HCC)  Wt Readings from Last 3 Encounters:   11/15/24 103 kg (226 lb 3.1 oz)   24 98.6 kg (217 lb 6.4 oz)   24 98.9 kg (218 lb)     Recent echocardiogram showing improvement in ejection fraction, currently 40 to 45%  Following closely with cardiology at [facility]  Medications: Entresto, metoprolol XL, atorvastatin, spironolactone, Imdur, Lasix  Continue to follow-up cardiology recommendations          POP (obstructive sleep apnea)  Patient follows closely with sleep medicine at [facility] -based on recent sleep study, recommending CPAP therapy       Acquired hypothyroidism  Chronic, stable  TSH in September was improving but still high, T4 was normal  Currently on levothyroxine 75 mcg daily  Will repeat labs next month, continue to monitor       Sciatica of left side  Following work related accident  Patient following closely with spine surgeon with [facility] in [place]  Also following with pain management with [facility]  Currently on gabapentin and as needed Norco, not prescribed by [facility]         Primary osteoarthritis of right knee  Patient following with [facility] sports med/orthopedics, Dr. Sanchez  Continue management per specialist         Class 1 obesity due to excess calories with serious comorbidity and body mass index (BMI) of 34.0 to 34.9 in adult  Patient making great efforts with lifestyle changes, continue  Continue to monitor         History of gastric ulcer  With associated anemia, continue to monitor CBC  Continue Protonix 40 mg twice a day  Follows with gastroenterology at [facility], continue to follow-up recommendations from specialist       Mixed hyperlipidemia  Lab Results   Component Value Date    CHOLESTEROL 194 09/27/2024    TRIG 168 (H) 09/27/2024    HDL 57 09/27/2024    LDLCALC 103 (H) 09/27/2024     Chronic, stable  Continue atorvastatin  Continue to monitor       Smoking greater than 25 pack years    Orders:    CT lung screening program; Future    Multiple gastric ulcers    Orders:    pantoprazole (PROTONIX) 40 mg tablet; Take 1 tablet (40 mg total) by mouth 2 (two) times a day before meals    Dysuria  Suspect candidal source, continue current treatment with Monistat  Symptoms improving  UA with glucose, likely from Jardiance which is now discontinued by cardiology this week  Patient to return to office if symptoms worsen or do not fully improve    Orders:    POCT urine  dip    History of tobacco abuse  Patient smoked about 1 pack/day for 25 years  Had CT of the chest in May 2024  Will continue with annual lung cancer screening until 15 years from patient's quit date of 3/22/2024       Immunizations and preventive care screenings were discussed with patient today. Appropriate education was printed on patient's after visit summary.        Lung Cancer Screening Shared Decision Making: I discussed with her that she is a candidate for lung cancer CT screening.     The following Shared Decision-Making points were covered:  Benefits of screening were discussed, including the rates of reduction in death from lung cancer and other causes.  Harms of screening were reviewed, including false positive tests, radiation exposure levels, risks of invasive procedures, risks of complications of screening, and risk of overdiagnosis.  I counseled on the importance of adherence to annual lung cancer LDCT screening, impact of co-morbidities, and ability or willingness to undergo diagnosis and treatment.  I counseled on the importance of maintaining abstinence as a former smoker or was counseled on the importance of smoking cessation if a current smoker    Review of Eligibility Criteria: She meets all of the criteria for Lung Cancer Screening.   - She is 64 y.o.   - She has 20 pack year tobacco history and is a current smoker or has quit within the past 15 years  - She presents no signs or symptoms of lung cancer    After discussion, the patient decided to elect lung cancer screening.      Return in about 4 months (around 3/15/2025) for Follow up chronic health conditions .    History of Present Illness       Chief Complaint   Patient presents with    New Patient Visit     Est care      Difficulty Urinating   This is a recurrent problem. The current episode started in the past 7 days. The problem occurs intermittently. The problem has been gradually worsening. The quality of the pain is described as  burning. The pain is at a severity of 1/10. The pain is mild. There has been no fever. She is Not sexually active. There is No history of pyelonephritis. Associated symptoms include a discharge, flank pain, frequency and hematuria.   Has been using monostat, seems to be improving with this. Urine culture from  was normal     PMH: CAD s/p stent placement, T2Dm, paroxysmal atrial flutter, HLD, HFrEF, s/p ICD and Pacemaker, hypothyroidism, PE, POP, Osteoarthritis, chronic back pain, PUD  SurgHx: appendectomy, back surgery, ICD placement, pacemaker placement, multiple cardiac catheterizations, stent placement, , septoplasty, tonsillectomy, endoscopy, adenoidectomy  Allergies: fish, shellfish, azithromycin  Medications: reviewed in chart and up to date.   SocialHx:   Tobacco: quit 3/22/2024. Smoked 25 years, 1ppd.    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 Bell (cardio), Dr. Sanchez (ortho), Back surgery (Dr. Jose Belcher), [facility] Cardiology EP, Dr. Campos (ortho), Dr. Caceres sleep med, left sided back sciatica s/p surgery from work injury ( Dr. Dixon buitrago spine for work injury at [employer], Pain mgmt at [facility] as well). Dr. Caceres  - diabetic eye in [place]     Needs back surgery in march   Dofetilide started by cardiology/EP    Exercises 3-4 x per week  Diet is much better - EF is improving!    Dentist -not recently  Gyn - has not been in a few years. Last pap was negative     Adult Annual Physical:  Patient presents for annual physical.     Diet and Physical Activity:  - Diet/Nutrition: well balanced diet, heart healthy (low sodium) diet, consuming 3-5 servings of fruits/vegetables daily, limited junk food and low carb diet.  - Exercise: walking, moderate cardiovascular exercise, 3-4 times a week on average and 1-2 hours on average.    General Health:  -  Sleep: sleeps well.  - Vision: goes for regular eye exams and most recent eye exam < 1 year ago.  - Dental: no dental visits for > 1 year and brushes teeth once daily.    /GYN Health:  - Follows with GYN: yes.   - Menopause: postmenopausal.   - History of STDs: no    Review of Systems   Genitourinary:  Positive for dysuria, flank pain, frequency and hematuria.     Current Outpatient Medications on File Prior to Visit   Medication Sig Dispense Refill    albuterol (ProAir HFA) 90 mcg/act inhaler Inhale 2 puffs every 6 (six) hours as needed for wheezing 8.5 g 0    apixaban (ELIQUIS) 5 mg Take 1 tablet (5 mg total) by mouth 2 (two) times a day 180 tablet 1    atorvastatin (LIPITOR) 80 mg tablet TAKE 1 TABLET BY MOUTH EVERY EVENING 100 tablet 1    clopidogrel (PLAVIX) 75 mg tablet Take 1 tablet (75 mg total) by mouth daily 30 tablet 5    diphenhydrAMINE (BENADRYL) 25 mg capsule Take 25 mg by mouth every 6 (six) hours as needed for itching      furosemide (LASIX) 20 mg tablet Take 2 tablets (40 mg total) by mouth 2 (two) times a day for 5 days, THEN 2 tablets (40 mg total) daily. 190 tablet 0    gabapentin (Neurontin) 100 mg capsule Take 1 capsule (100 mg total) by mouth 2 (two) times a day      glimepiride (AMARYL) 1 mg tablet Take 1 tablet (1 mg total) by mouth daily with breakfast 90 tablet 3    HYDROcodone-acetaminophen (NORCO) 5-325 mg per tablet Take 1 tablet by mouth every 6 (six) hours as needed for pain for up to 10 doses Max Daily Amount: 4 tablets 10 tablet 0    isosorbide mononitrate (IMDUR) 30 mg 24 hr tablet Take 1 tablet (30 mg total) by mouth daily 90 tablet 5    levothyroxine (Synthroid) 75 mcg tablet Take 1 tablet (75 mcg total) by mouth daily 90 tablet 3    meclizine (ANTIVERT) 25 mg tablet Take 1 tablet (25 mg total) by mouth every 8 (eight) hours as needed for dizziness 30 tablet 0    metoprolol succinate (TOPROL-XL) 25 mg 24 hr tablet Take 2 tablets (50 mg total) by mouth daily at bedtime 180  tablet 5    sacubitril-valsartan (Entresto) 49-51 MG TABS Take 1 tablet by mouth 2 (two) times a day 180 tablet 5    spironolactone (ALDACTONE) 25 mg tablet Take 1 tablet (25 mg total) by mouth daily 90 tablet 5    [DISCONTINUED] Durolane 60 MG/3ML injection       [DISCONTINUED] pantoprazole (PROTONIX) 40 mg tablet Take 1 tablet (40 mg total) by mouth 2 (two) times a day before meals 60 tablet 5     No current facility-administered medications on file prior to visit.      Social History     Tobacco Use    Smoking status: Former     Current packs/day: 0.00     Average packs/day: 1 pack/day for 24.2 years (24.2 ttl pk-yrs)     Types: Cigarettes     Start date: 2000     Quit date: 3/22/2024     Years since quittin.6     Passive exposure: Never    Smokeless tobacco: Never    Tobacco comments:     Smoked 0.5-1 ppd; quit in 2024.    Vaping Use    Vaping status: Never Used   Substance and Sexual Activity    Alcohol use: Not Currently     Alcohol/week: 1.0 standard drink of alcohol     Types: 1 Shots of liquor per week     Comment: Every 2or 3months    Drug use: Never    Sexual activity: Not on file       Objective   /61 (BP Location: Right arm, Patient Position: Sitting, Cuff Size: Standard)   Pulse 71   Wt 103 kg (226 lb 3.1 oz)   SpO2 98%   BMI 34.39 kg/m²     Physical Exam  Vitals reviewed.   Constitutional:       General: She is not in acute distress.     Appearance: She is obese. She is not ill-appearing.   HENT:      Head: Normocephalic and atraumatic.      Right Ear: External ear normal.      Left Ear: External ear normal.      Nose: Nose normal.   Eyes:      Extraocular Movements: Extraocular movements intact.      Conjunctiva/sclera: Conjunctivae normal.   Cardiovascular:      Rate and Rhythm: Normal rate and regular rhythm.      Heart sounds: No murmur heard.  Pulmonary:      Effort: Pulmonary effort is normal.      Breath sounds: Normal breath sounds.   Abdominal:      General: Abdomen is  flat. Bowel sounds are normal.      Palpations: Abdomen is soft.   Musculoskeletal:      Cervical back: Neck supple.      Right lower leg: Edema present.      Left lower leg: Edema present.   Neurological:      General: No focal deficit present.      Mental Status: She is alert.   Psychiatric:         Mood and Affect: Mood normal.         Behavior: Behavior normal.

## 2024-11-15 NOTE — ASSESSMENT & PLAN NOTE
Patient smoked about 1 pack/day for 25 years  Had CT of the chest in May 2024  Will continue with annual lung cancer screening until 15 years from patient's quit date of 3/22/2024

## 2024-11-15 NOTE — ASSESSMENT & PLAN NOTE
Lab Results   Component Value Date    CHOLESTEROL 194 09/27/2024    TRIG 168 (H) 09/27/2024    HDL 57 09/27/2024    LDLCALC 103 (H) 09/27/2024     Chronic, stable  Continue atorvastatin  Continue to monitor

## 2024-11-15 NOTE — ASSESSMENT & PLAN NOTE
Status post pacemaker placement, follows with [facility] cardiology and electrophysiology  Rate control: Metoprolol succinate 50 mg daily  AC: Eliquis 5 mg twice daily  Continue to follow-up cardiology recommendations

## 2024-11-15 NOTE — ASSESSMENT & PLAN NOTE
Chronic, stable  TSH in September was improving but still high, T4 was normal  Currently on levothyroxine 75 mcg daily  Will repeat labs next month, continue to monitor

## 2024-11-15 NOTE — ASSESSMENT & PLAN NOTE
With associated anemia, continue to monitor CBC  Continue Protonix 40 mg twice a day  Follows with gastroenterology at [facility], continue to follow-up recommendations from specialist

## 2024-11-15 NOTE — ASSESSMENT & PLAN NOTE
Status post stent placement  Follows with cardiology, Dr. Bell with [facility] cardiology  Medications: Entresto, metoprolol XL, atorvastatin, Plavix  Continue to follow-up cardiology recommendations

## 2024-11-15 NOTE — PROGRESS NOTES
Name: Vesna Langston      : 1960      MRN: [id number]  Encounter Provider: Melissa Eldridge DO  Encounter Date: 11/15/2024   Encounter department: [facility] PRIMARY CARE  :  Assessment & Plan  Dysuria    Orders:    POCT urine dip    Urine culture; Future    Multiple gastric ulcers    Orders:    pantoprazole (PROTONIX) 40 mg tablet; Take 1 tablet (40 mg total) by mouth 2 (two) times a day before meals    Smoking greater than 25 pack years    Orders:    CT lung screening program; Future    Type 2 diabetes mellitus without complication, without long-term current use of insulin ([facility])    Lab Results   Component Value Date    HGBA1C 7.2 (H) 2024       Orders:    Lipid panel; Future    Hemoglobin A1C; Future    CBC and differential; Future    Basic metabolic panel; Future    TSH, 3rd generation with Free T4 reflex; Future           History of Present Illness {?Quick Links Encounters * My Last Note * Last Note in Specialty * Snapshot * Since Last Visit * History :69888}  Chief Complaint   Patient presents with    New Patient Visit     Est care        Difficulty Urinating   This is a recurrent problem. The current episode started in the past 7 days. The problem occurs intermittently. The problem has been gradually worsening. The quality of the pain is described as burning. The pain is at a severity of 1/10. The pain is mild. There has been no fever. She is Not sexually active. There is No history of pyelonephritis. Associated symptoms include a discharge, flank pain, frequency and hematuria.   Has been using monostat, seems to be improving with this.     PMH: CAD s/p stent placement, T2Dm, paroxysmal atrial flutter, HLD, HFrEF, s/p ICD and Pacemaker, hypothyroidism, PE, POP, Osteoarthritis, chronic back pain, PUD  SurgHx: appendectomy   Allergies: fish, shellfish, azithromycin  Medications: reviewed in chart and up to date.   FamHx:   SocialHx:   Tobacco: quit 3/22/2024. Smoked 25 years,  1ppd.    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 Bell (cardio), Dr. Sanchez (ortho), Back surgery (Dr. Jose Belcher), Gastroenterology ([facility]), Cardiology EP, Dr. Campos (ortho), Dr. Caceres sleep med, left sided back sciatica s/p surgery from work injury ( Dr. Dixon buitrago spine for work injury at [employer], Pain mgmt at [facility] as well). Dr. Caceres  - diabetic eye in [place]     Needs back surgery in march   Dofetilide     Exercises 3-4 x per week  Diet is much betrter    Dentist -not recently  Gyn - has not been in a few years. Last pap was negative     Review of Systems   Genitourinary:  Positive for dysuria, flank pain, frequency and hematuria.     {Select to insert medical history sections (Optional):57807}     Objective {?Quick Links Trend Vitals * Enter New Vitals * Results Review * Timeline (Adult) * Labs * Imaging * Cardiology * Procedures * Lung Cancer Screening * Surgical eConsent :74018}  /61 (BP Location: Right arm, Patient Position: Sitting, Cuff Size: Standard)   Pulse 71   Wt 103 kg (226 lb 3.1 oz)   SpO2 98%   BMI 34.39 kg/m²      Physical Exam  {Administrative / Billing Section (Optional):05389}

## 2024-11-15 NOTE — ASSESSMENT & PLAN NOTE
Following work related accident  Patient following closely with spine surgeon with [facility] in [place]  Also following with pain management with [facility]  Currently on gabapentin and as needed Norco, not prescribed by [facility]

## 2024-11-18 RX ORDER — FUROSEMIDE 20 MG/1
TABLET ORAL
Qty: 190 TABLET | Refills: 0 | Status: SHIPPED | OUTPATIENT
Start: 2024-11-18

## 2024-11-20 ENCOUNTER — TELEPHONE (OUTPATIENT)
Age: 64
End: 2024-11-20

## 2024-11-20 ENCOUNTER — OFFICE VISIT (OUTPATIENT)
Dept: URGENT CARE | Facility: CLINIC | Age: 64
End: 2024-11-20
Payer: COMMERCIAL

## 2024-11-20 VITALS
TEMPERATURE: 96.6 F | HEIGHT: 68 IN | HEART RATE: 90 BPM | SYSTOLIC BLOOD PRESSURE: 106 MMHG | OXYGEN SATURATION: 96 % | RESPIRATION RATE: 18 BRPM | BODY MASS INDEX: 33.34 KG/M2 | WEIGHT: 220 LBS | DIASTOLIC BLOOD PRESSURE: 58 MMHG

## 2024-11-20 DIAGNOSIS — J01.10 ACUTE NON-RECURRENT FRONTAL SINUSITIS: Primary | ICD-10-CM

## 2024-11-20 PROCEDURE — G0382 LEV 3 HOSP TYPE B ED VISIT: HCPCS

## 2024-11-20 PROCEDURE — S9083 URGENT CARE CENTER GLOBAL: HCPCS

## 2024-11-20 RX ORDER — FLUTICASONE PROPIONATE 50 MCG
1 SPRAY, SUSPENSION (ML) NASAL DAILY
Qty: 9.9 ML | Refills: 0 | Status: SHIPPED | OUTPATIENT
Start: 2024-11-20

## 2024-11-20 NOTE — PATIENT INSTRUCTIONS
Take full course of Augmentin as prescribed  Eat yogurt with live and active cultures and/or take a probiotic at least 3 hours before or after antibiotic dose. Monitor stool for diarrhea and/or blood. If this occurs, contact primary care doctor ASAP.   Take flonase as prescribed    Continue Coricidin over the counter  Warm compresses over sinuses  Steam treatment (practice proper safety precautions when handing hot liquids/steam)  Over the counter saline nasal spray    Follow up with PCP in 3-5 days.  Proceed to  ER if symptoms worsen.    If tests are performed, our office will contact you with results only if changes need to made to the care plan discussed with you at the visit. You can review your full results on [facility] My[facility].

## 2024-11-20 NOTE — TELEPHONE ENCOUNTER
Vesna called asking what else she can take besides Coricidin HBP and what kind of nasal spray for sinus congestion for three days. She will call PCP for an appt.

## 2024-11-20 NOTE — LETTER
November 20, 2024     Patient: Vesna Langston   YOB: 1960   Date of Visit: 11/20/2024       To Whom it May Concern:    Vesna Langston was seen in my clinic on 11/20/2024. She may return to work on 11/21/2024 .    If you have any questions or concerns, please don't hesitate to call.         Sincerely,          MAHI Price        CC: No Recipients

## 2024-11-20 NOTE — PROGRESS NOTES
[facility]        NAME: Vesna Langston is a 64 y.o. female  : 1960    MRN: [id number]  DATE: 2024  TIME: 10:12 AM    Assessment and Plan   Acute non-recurrent frontal sinusitis [J01.10]  1. Acute non-recurrent frontal sinusitis  amoxicillin-clavulanate (AUGMENTIN) 875-125 mg per tablet    fluticasone (FLONASE) 50 mcg/act nasal spray            Patient Instructions     Take full course of Augmentin as prescribed  Eat yogurt with live and active cultures and/or take a probiotic at least 3 hours before or after antibiotic dose. Monitor stool for diarrhea and/or blood. If this occurs, contact primary care doctor ASAP.   Take flonase as prescribed    Continue Coricidin over the counter  Warm compresses over sinuses  Steam treatment (practice proper safety precautions when handing hot liquids/steam)  Over the counter saline nasal spray    Follow up with PCP in 3-5 days.  Proceed to  ER if symptoms worsen.    If tests are performed, our office will contact you with results only if changes need to made to the care plan discussed with you at the visit. [facility]    Chief Complaint     Chief Complaint   Patient presents with   • Cold Like Symptoms     Started 5 days ago  Sinus congestion, and bilateral ear fullness  OTC coricidin   2 Home COVID test (negative)  Being admitted to cardiology unit on  for medication induction, and has amiodarone on hold. Instructed by cardiology to be seen  at urgent care.          History of Present Illness       64-year-old female who arrives reporting sinus congestion and bilateral ear fullness increasing over the past 5 days.  Patient reports she has been taking over-the-counter Coricidin as recommended by her cardiologist without any relief of symptoms.  Patient reports she also took 2 home COVID test which were both negative.  Patient denies any sick contacts at home.  Patient denies any cough, shortness of  breath, chest pain or abdominal pain at present.  Patient reports feeling generally rundown and fatigued.  Patient is requesting work note.    Review of Systems   Review of Systems   Constitutional:  Positive for fatigue. Negative for chills, diaphoresis and fever.   HENT:  Positive for congestion, sinus pressure and sinus pain.    Respiratory: Negative.     Cardiovascular: Negative.    Gastrointestinal: Negative.          Current Medications       Current Outpatient Medications:   •  albuterol (ProAir HFA) 90 mcg/act inhaler, Inhale 2 puffs every 6 (six) hours as needed for wheezing, Disp: 8.5 g, Rfl: 0  •  amoxicillin-clavulanate (AUGMENTIN) 875-125 mg per tablet, Take 1 tablet by mouth every 12 (twelve) hours for 7 days, Disp: 14 tablet, Rfl: 0  •  apixaban (ELIQUIS) 5 mg, Take 1 tablet (5 mg total) by mouth 2 (two) times a day, Disp: 180 tablet, Rfl: 1  •  atorvastatin (LIPITOR) 80 mg tablet, TAKE 1 TABLET BY MOUTH EVERY EVENING, Disp: 100 tablet, Rfl: 1  •  clopidogrel (PLAVIX) 75 mg tablet, Take 1 tablet (75 mg total) by mouth daily, Disp: 30 tablet, Rfl: 5  •  diphenhydrAMINE (BENADRYL) 25 mg capsule, Take 25 mg by mouth every 6 (six) hours as needed for itching, Disp: , Rfl:   •  fluticasone (FLONASE) 50 mcg/act nasal spray, 1 spray into each nostril daily, Disp: 9.9 mL, Rfl: 0  •  furosemide (LASIX) 20 mg tablet, Take 2 tablets (40 mg total) by mouth 2 (two) times a day for 5 days. THEN 2 tablets (40 mg total) in the AM & then 1 tablet- 20 mg in the PM., Disp: 190 tablet, Rfl: 0  •  gabapentin (Neurontin) 100 mg capsule, Take 1 capsule (100 mg total) by mouth 2 (two) times a day, Disp: , Rfl:   •  glimepiride (AMARYL) 1 mg tablet, Take 1 tablet (1 mg total) by mouth daily with breakfast, Disp: 90 tablet, Rfl: 3  •  HYDROcodone-acetaminophen (NORCO) 5-325 mg per tablet, Take 1 tablet by mouth every 6 (six) hours as needed for pain for up to 10 doses Max Daily Amount: 4 tablets, Disp: 10 tablet, Rfl: 0  •   isosorbide mononitrate (IMDUR) 30 mg 24 hr tablet, Take 1 tablet (30 mg total) by mouth daily, Disp: 90 tablet, Rfl: 5  •  levothyroxine (Synthroid) 75 mcg tablet, Take 1 tablet (75 mcg total) by mouth daily, Disp: 90 tablet, Rfl: 3  •  meclizine (ANTIVERT) 25 mg tablet, Take 1 tablet (25 mg total) by mouth every 8 (eight) hours as needed for dizziness, Disp: 30 tablet, Rfl: 0  •  metoprolol succinate (TOPROL-XL) 25 mg 24 hr tablet, Take 2 tablets (50 mg total) by mouth daily at bedtime, Disp: 180 tablet, Rfl: 5  •  pantoprazole (PROTONIX) 40 mg tablet, Take 1 tablet (40 mg total) by mouth 2 (two) times a day before meals, Disp: 60 tablet, Rfl: 5  •  sacubitril-valsartan (Entresto) 49-51 MG TABS, Take 1 tablet by mouth 2 (two) times a day, Disp: 180 tablet, Rfl: 5  •  spironolactone (ALDACTONE) 25 mg tablet, Take 1 tablet (25 mg total) by mouth daily, Disp: 90 tablet, Rfl: 5    Current Allergies     Allergies as of 11/20/2024 - Reviewed 11/20/2024   Allergen Reaction Noted   • Fish-derived products - food allergy Anaphylaxis 10/01/2024   • Shellfish-derived products - food allergy Anaphylaxis 06/10/2019   • Azithromycin Hives and Rash 10/10/2012            The following portions of the patient's history were reviewed and updated as appropriate: allergies, current medications, past family history, past medical history, past social history, past surgical history and problem list.     Past Medical History:   Diagnosis Date   • Acute respiratory insufficiency 03/22/2024   • Allergic    • Chronic HFrEF (heart failure with reduced ejection fraction) (HCC)    • Coronary artery disease    • COVID-19 virus infection 11/28/2022   • Diabetes mellitus (HCC)    • Disease of thyroid gland 4/09/2024   • Heart disease 3/24   • Hyperlipidemia    • Hypoglycemia    • ICD (implantable cardioverter-defibrillator) in place    • Ischemic cardiomyopathy    • Lactic acidosis 03/22/2024   • Myocardial infarction (HCC) 3/22/2024   • Otitis  media    • Seasonal allergies    • ST elevation myocardial infarction involving left anterior descending (LAD) coronary artery ([facility]) 2024   • Tobacco abuse    • Visual impairment        Past Surgical History:   Procedure Laterality Date   • ADENOIDECTOMY     • APPENDECTOMY     • APPENDECTOMY LAPAROSCOPIC N/A 2024    Procedure: APPENDECTOMY LAPAROSCOPIC;  Surgeon: Lauryn Ullrich, DO;  [facility]: [facility];  Service: General   • BACK SURGERY     • BREAST EXCISIONAL BIOPSY Right 2000    cyst removed- benign   • CARDIAC CATHETERIZATION N/A 2024    Procedure: Cardiac pci;  Surgeon: Gabriel Myers MD;  [facility];  Service: Cardiology   • CARDIAC CATHETERIZATION N/A 2024    Procedure: Cardiac Coronary Angiogram;  Surgeon: Gabriel Myers MD;  [facility];  Service: Cardiology   • CARDIAC CATHETERIZATION N/A 2024    Procedure: Cardiac PCI AMI;  Surgeon: Gabriel Myers MD;  [facility];  Service: Cardiology   • CARDIAC CATHETERIZATION N/A 2024    Procedure: Cardiac IVUS;  Surgeon: Gabriel Myers MD;  [facility];  Service: Cardiology   • CARDIAC DEFIBRILLATOR PLACEMENT     • CARDIAC ELECTROPHYSIOLOGY PROCEDURE N/A 2024    Procedure: Cardiac icd implant;  Surgeon: Jeffy Lama MD;  [facility];  Service: Cardiology   •  SECTION     • COLONOSCOPY     • ECTOPIC PREGNANCY SURGERY     • INSERT / REPLACE / REMOVE PACEMAKER     • MASS EXCISION Left 10/18/2024    Procedure: EXCISION BIOPSY TISSUE LESION/MASS UPPER EXTREMITY - Left index finger;  Surgeon: Leandro Campos MD;  Location:  MAIN OR;  Service: Orthopedics   • SEPTOPLASTY     • SPINE SURGERY  23   • TONSILLECTOMY         Family History   Adopted: Yes   Problem Relation Age of Onset   • Depression Mother    • Heart disease Father    • OCD Daughter          Medications have been verified.        Objective   /58    "Pulse 90   Temp (!) 96.6 °F (35.9 °C)   Resp 18   Ht 5' 8\" (1.727 m)   Wt 99.8 kg (220 lb)   SpO2 96%   BMI 33.45 kg/m²        Physical Exam     Physical Exam  Vitals and nursing note reviewed.   Constitutional:       General: She is not in acute distress.     Appearance: Normal appearance. She is ill-appearing.   HENT:      Head: Normocephalic.      Right Ear: External ear normal. A middle ear effusion is present.      Left Ear: External ear normal. A middle ear effusion is present.      Nose: Congestion present.      Right Sinus: Maxillary sinus tenderness and frontal sinus tenderness present.      Left Sinus: Maxillary sinus tenderness and frontal sinus tenderness present.      Mouth/Throat:      Mouth: Mucous membranes are moist.      Pharynx: Posterior oropharyngeal erythema present. No oropharyngeal exudate.   Eyes:      Extraocular Movements: Extraocular movements intact.      Conjunctiva/sclera: Conjunctivae normal.      Pupils: Pupils are equal, round, and reactive to light.   Cardiovascular:      Rate and Rhythm: Normal rate and regular rhythm.      Pulses: Normal pulses.      Heart sounds: Normal heart sounds.   Pulmonary:      Effort: Pulmonary effort is normal. No respiratory distress.      Breath sounds: Normal breath sounds. No stridor. No wheezing, rhonchi or rales.   Chest:      Chest wall: No tenderness.   Musculoskeletal:         General: Normal range of motion.      Cervical back: Normal range of motion and neck supple.   Lymphadenopathy:      Cervical: No cervical adenopathy.   Skin:     General: Skin is warm and dry.      Capillary Refill: Capillary refill takes less than 2 seconds.   Neurological:      General: No focal deficit present.      Mental Status: She is alert and oriented to person, place, and time.   Psychiatric:         Mood and Affect: Mood normal.         Behavior: Behavior normal.               "

## 2024-11-22 ENCOUNTER — OFFICE VISIT (OUTPATIENT)
Dept: OBGYN CLINIC | Facility: CLINIC | Age: 64
End: 2024-11-22

## 2024-11-22 VITALS
WEIGHT: 220.02 LBS | SYSTOLIC BLOOD PRESSURE: 94 MMHG | OXYGEN SATURATION: 99 % | DIASTOLIC BLOOD PRESSURE: 52 MMHG | BODY MASS INDEX: 33.35 KG/M2 | HEIGHT: 68 IN

## 2024-11-22 DIAGNOSIS — D48.19 TENOSYNOVIAL GIANT CELL TUMOR: Primary | ICD-10-CM

## 2024-11-22 PROCEDURE — 99024 POSTOP FOLLOW-UP VISIT: CPT | Performed by: STUDENT IN AN ORGANIZED HEALTH CARE EDUCATION/TRAINING PROGRAM

## 2024-11-22 NOTE — PROGRESS NOTES
Orthopedic Surgery Management Note  Vesna Langston (64 y.o. female)  : 1960 Encounter Date: 2024    Assessment/Plan:  64 y.o. female 5 weeks follow up status post left index finger excision of mass done on 10/18/2024.  The incision has completely healed.  She has no tenderness about the finger.  She has full range of motion.    Patient is doing well at this time s/p surgical intervention.   Incision is well healed at this time.  Discussed that patient has a neuropraxia at the tip of the index finger as a result of the nerve being stretched during surgery.   Weight bearing: As tolerated  Pain control: over-counter-analgesics as needed  Return if symptoms worsen or fail to improve.        Subjective:  64 y.o. female presenting to the office for 5 weeks follow up, status post left index finger excision of mass  DOI: 10/18/2024    Patient states that their pain is none   Patient denies symptoms of an infection.     Current concerns: Patient reports she continues to have some numbness in the tip of the left index finger.     Review of Systems  Review of systems negative unless otherwise specified in HPI    Past Medical History  Past Medical History:   Diagnosis Date    Acute respiratory insufficiency 2024    Allergic     Chronic HFrEF (heart failure with reduced ejection fraction) ([facility])     Coronary artery disease     COVID-19 virus infection 2022    Diabetes mellitus (HCC)     Disease of thyroid gland 2024    Heart disease 3/24    Hyperlipidemia     Hypoglycemia     ICD (implantable cardioverter-defibrillator) in place     Ischemic cardiomyopathy     Lactic acidosis 2024    Myocardial infarction ([facility]) 3/22/2024    Otitis media 1966    Seasonal allergies     ST elevation myocardial infarction involving left anterior descending (LAD) coronary artery ([facility]) 2024    Tobacco abuse     Visual impairment      Past Surgical History  Past Surgical History:   Procedure Laterality Date     ADENOIDECTOMY      APPENDECTOMY      APPENDECTOMY LAPAROSCOPIC N/A 2024    Procedure: APPENDECTOMY LAPAROSCOPIC;  Surgeon: Lauryn Ullrich, DO;  [facility]: [facility];  Service: General    BACK SURGERY      BREAST EXCISIONAL BIOPSY Right 2000    cyst removed- benign    CARDIAC CATHETERIZATION N/A 2024    Procedure: Cardiac pci;  Surgeon: Gabriel Myers MD;  [facility];  Service: Cardiology    CARDIAC CATHETERIZATION N/A 2024    Procedure: Cardiac Coronary Angiogram;  Surgeon: Gabriel Myers MD;  [facility];  Service: Cardiology    CARDIAC CATHETERIZATION N/A 2024    Procedure: Cardiac PCI AMI;  Surgeon: Gabriel Myers MD;  [facility];  Service: Cardiology    CARDIAC CATHETERIZATION N/A 2024    Procedure: Cardiac IVUS;  Surgeon: Gabriel Myers MD;  [facility];  Service: Cardiology    CARDIAC DEFIBRILLATOR PLACEMENT      CARDIAC ELECTROPHYSIOLOGY PROCEDURE N/A 2024    Procedure: Cardiac icd implant;  Surgeon: Jeffy Lama MD;  [facility];  Service: Cardiology     SECTION      COLONOSCOPY      ECTOPIC PREGNANCY SURGERY      INSERT / REPLACE / REMOVE PACEMAKER      MASS EXCISION Left 10/18/2024    Procedure: EXCISION BIOPSY TISSUE LESION/MASS UPPER EXTREMITY - Left index finger;  Surgeon: Leandro Campos MD;  Location: [facility];  Service: Orthopedics    SEPTOPLASTY      SPINE SURGERY  23    TONSILLECTOMY       Current Medications  Current Outpatient Medications on File Prior to Visit   Medication Sig Dispense Refill    albuterol (ProAir HFA) 90 mcg/act inhaler Inhale 2 puffs every 6 (six) hours as needed for wheezing 8.5 g 0    amoxicillin-clavulanate (AUGMENTIN) 875-125 mg per tablet Take 1 tablet by mouth every 12 (twelve) hours for 7 days 14 tablet 0    apixaban (ELIQUIS) 5 mg Take 1 tablet (5 mg total) by mouth 2 (two) times a day 180 tablet 1    atorvastatin (LIPITOR) 80 mg  tablet TAKE 1 TABLET BY MOUTH EVERY EVENING 100 tablet 1    clopidogrel (PLAVIX) 75 mg tablet Take 1 tablet (75 mg total) by mouth daily 30 tablet 5    diphenhydrAMINE (BENADRYL) 25 mg capsule Take 25 mg by mouth every 6 (six) hours as needed for itching      fluticasone (FLONASE) 50 mcg/act nasal spray 1 spray into each nostril daily 9.9 mL 0    furosemide (LASIX) 20 mg tablet Take 2 tablets (40 mg total) by mouth 2 (two) times a day for 5 days. THEN 2 tablets (40 mg total) in the AM & then 1 tablet- 20 mg in the PM. 190 tablet 0    gabapentin (Neurontin) 100 mg capsule Take 1 capsule (100 mg total) by mouth 2 (two) times a day      glimepiride (AMARYL) 1 mg tablet Take 1 tablet (1 mg total) by mouth daily with breakfast 90 tablet 3    HYDROcodone-acetaminophen (NORCO) 5-325 mg per tablet Take 1 tablet by mouth every 6 (six) hours as needed for pain for up to 10 doses Max Daily Amount: 4 tablets 10 tablet 0    isosorbide mononitrate (IMDUR) 30 mg 24 hr tablet Take 1 tablet (30 mg total) by mouth daily 90 tablet 5    levothyroxine (Synthroid) 75 mcg tablet Take 1 tablet (75 mcg total) by mouth daily 90 tablet 3    meclizine (ANTIVERT) 25 mg tablet Take 1 tablet (25 mg total) by mouth every 8 (eight) hours as needed for dizziness 30 tablet 0    metoprolol succinate (TOPROL-XL) 25 mg 24 hr tablet Take 2 tablets (50 mg total) by mouth daily at bedtime 180 tablet 5    pantoprazole (PROTONIX) 40 mg tablet Take 1 tablet (40 mg total) by mouth 2 (two) times a day before meals 60 tablet 5    sacubitril-valsartan (Entresto) 49-51 MG TABS Take 1 tablet by mouth 2 (two) times a day (Patient taking differently: Take 2 tablets by mouth daily at bedtime 50 ml total) 180 tablet 5    spironolactone (ALDACTONE) 25 mg tablet Take 1 tablet (25 mg total) by mouth daily 90 tablet 5     No current facility-administered medications on file prior to visit.       Physical exam  Exam: left hand  Inspection: Skin intact. Incision is well  healed without signs of acute infection. No soft tissue swelling  Range of Motion: full composite fist possible  Neurovascular status: Neuro intact        Imaging:  No new imaging    Scribe Attestation      I,:  Dalila Lang PA-C am acting as a scribe while in the presence of the attending physician.:       I,:  Leandro Campos MD personally performed the services described in this documentation    as scribed in my presence.:

## 2024-11-25 ENCOUNTER — HOSPITAL ENCOUNTER (INPATIENT)
Facility: HOSPITAL | Age: 64
LOS: 2 days | Discharge: HOME | End: 2024-11-27
Attending: INTERNAL MEDICINE | Admitting: INTERNAL MEDICINE
Payer: COMMERCIAL

## 2024-11-25 DIAGNOSIS — Z86.79 HISTORY OF SUSTAINED VENTRICULAR TACHYCARDIA: Primary | Chronic | ICD-10-CM

## 2024-11-25 LAB
ANION GAP SERPL CALCULATED.3IONS-SCNC: 15 MMOL/L (ref 4–13)
BASOPHILS # BLD AUTO: 0.05 THOUSANDS/ΜL (ref 0–0.1)
BASOPHILS NFR BLD AUTO: 1 % (ref 0–1)
BUN SERPL-MCNC: 22 MG/DL (ref 5–25)
CALCIUM SERPL-MCNC: 8.7 MG/DL (ref 8.4–10.2)
CHLORIDE SERPL-SCNC: 105 MMOL/L (ref 96–108)
CO2 SERPL-SCNC: 18 MMOL/L (ref 21–32)
CREAT SERPL-MCNC: 1.08 MG/DL (ref 0.6–1.3)
EOSINOPHIL # BLD AUTO: 0.47 THOUSAND/ΜL (ref 0–0.61)
EOSINOPHIL NFR BLD AUTO: 5 % (ref 0–6)
ERYTHROCYTE [DISTWIDTH] IN BLOOD BY AUTOMATED COUNT: 15.6 % (ref 11.6–15.1)
GFR SERPL CREATININE-BSD FRML MDRD: 53 ML/MIN/1.73SQ M
GLUCOSE SERPL-MCNC: 153 MG/DL (ref 65–140)
GLUCOSE SERPL-MCNC: 91 MG/DL (ref 65–140)
HCT VFR BLD AUTO: 35.9 % (ref 34.8–46.1)
HGB BLD-MCNC: 11.2 G/DL (ref 11.5–15.4)
IMM GRANULOCYTES # BLD AUTO: 0.02 THOUSAND/UL (ref 0–0.2)
IMM GRANULOCYTES NFR BLD AUTO: 0 % (ref 0–2)
LYMPHOCYTES # BLD AUTO: 2 THOUSANDS/ΜL (ref 0.6–4.47)
LYMPHOCYTES NFR BLD AUTO: 22 % (ref 14–44)
MAGNESIUM SERPL-MCNC: 2.2 MG/DL (ref 1.9–2.7)
MCH RBC QN AUTO: 26.8 PG (ref 26.8–34.3)
MCHC RBC AUTO-ENTMCNC: 31.2 G/DL (ref 31.4–37.4)
MCV RBC AUTO: 86 FL (ref 82–98)
MONOCYTES # BLD AUTO: 0.75 THOUSAND/ΜL (ref 0.17–1.22)
MONOCYTES NFR BLD AUTO: 8 % (ref 4–12)
NEUTROPHILS # BLD AUTO: 5.76 THOUSANDS/ΜL (ref 1.85–7.62)
NEUTS SEG NFR BLD AUTO: 64 % (ref 43–75)
NRBC BLD AUTO-RTO: 0 /100 WBCS
PLATELET # BLD AUTO: 299 THOUSANDS/UL (ref 149–390)
PMV BLD AUTO: 9.2 FL (ref 8.9–12.7)
POTASSIUM SERPL-SCNC: 4.9 MMOL/L (ref 3.5–5.3)
RBC # BLD AUTO: 4.18 MILLION/UL (ref 3.81–5.12)
SODIUM SERPL-SCNC: 138 MMOL/L (ref 135–147)
WBC # BLD AUTO: 9.05 THOUSAND/UL (ref 4.31–10.16)

## 2024-11-25 PROCEDURE — 85025 COMPLETE CBC W/AUTO DIFF WBC: CPT | Performed by: PHYSICIAN ASSISTANT

## 2024-11-25 PROCEDURE — 80048 BASIC METABOLIC PNL TOTAL CA: CPT | Performed by: PHYSICIAN ASSISTANT

## 2024-11-25 PROCEDURE — 93005 ELECTROCARDIOGRAM TRACING: CPT

## 2024-11-25 PROCEDURE — 82948 REAGENT STRIP/BLOOD GLUCOSE: CPT

## 2024-11-25 PROCEDURE — 99222 1ST HOSP IP/OBS MODERATE 55: CPT | Performed by: PHYSICIAN ASSISTANT

## 2024-11-25 PROCEDURE — 83735 ASSAY OF MAGNESIUM: CPT | Performed by: PHYSICIAN ASSISTANT

## 2024-11-25 RX ORDER — ALBUTEROL SULFATE 90 UG/1
2 INHALANT RESPIRATORY (INHALATION) EVERY 6 HOURS PRN
Status: DISCONTINUED | OUTPATIENT
Start: 2024-11-25 | End: 2024-11-27 | Stop reason: HOSPADM

## 2024-11-25 RX ORDER — DOFETILIDE 0.25 MG/1
250 CAPSULE ORAL EVERY 12 HOURS SCHEDULED
Status: DISCONTINUED | OUTPATIENT
Start: 2024-11-25 | End: 2024-11-26

## 2024-11-25 RX ORDER — GABAPENTIN 100 MG/1
100 CAPSULE ORAL 2 TIMES DAILY
Status: DISCONTINUED | OUTPATIENT
Start: 2024-11-25 | End: 2024-11-27 | Stop reason: HOSPADM

## 2024-11-25 RX ORDER — ATORVASTATIN CALCIUM 80 MG/1
80 TABLET, FILM COATED ORAL
Status: DISCONTINUED | OUTPATIENT
Start: 2024-11-25 | End: 2024-11-27 | Stop reason: HOSPADM

## 2024-11-25 RX ORDER — METOPROLOL SUCCINATE 50 MG/1
50 TABLET, EXTENDED RELEASE ORAL
Status: DISCONTINUED | OUTPATIENT
Start: 2024-11-25 | End: 2024-11-27 | Stop reason: HOSPADM

## 2024-11-25 RX ORDER — ISOSORBIDE MONONITRATE 30 MG/1
30 TABLET, EXTENDED RELEASE ORAL DAILY
Status: DISCONTINUED | OUTPATIENT
Start: 2024-11-25 | End: 2024-11-27 | Stop reason: HOSPADM

## 2024-11-25 RX ORDER — DOCUSATE SODIUM 100 MG/1
100 CAPSULE, LIQUID FILLED ORAL 2 TIMES DAILY PRN
Status: DISCONTINUED | OUTPATIENT
Start: 2024-11-25 | End: 2024-11-27 | Stop reason: HOSPADM

## 2024-11-25 RX ORDER — CLOPIDOGREL BISULFATE 75 MG/1
75 TABLET ORAL DAILY
Status: DISCONTINUED | OUTPATIENT
Start: 2024-11-25 | End: 2024-11-27 | Stop reason: HOSPADM

## 2024-11-25 RX ORDER — LEVOTHYROXINE SODIUM 75 UG/1
75 TABLET ORAL
Status: DISCONTINUED | OUTPATIENT
Start: 2024-11-25 | End: 2024-11-27 | Stop reason: HOSPADM

## 2024-11-25 RX ORDER — MECLIZINE HYDROCHLORIDE 25 MG/1
25 TABLET ORAL EVERY 8 HOURS PRN
Status: DISCONTINUED | OUTPATIENT
Start: 2024-11-25 | End: 2024-11-27 | Stop reason: HOSPADM

## 2024-11-25 RX ORDER — DIPHENHYDRAMINE HCL 25 MG
25 TABLET ORAL EVERY 6 HOURS PRN
Status: DISCONTINUED | OUTPATIENT
Start: 2024-11-25 | End: 2024-11-27 | Stop reason: HOSPADM

## 2024-11-25 RX ORDER — SPIRONOLACTONE 25 MG/1
25 TABLET ORAL DAILY
Status: DISCONTINUED | OUTPATIENT
Start: 2024-11-26 | End: 2024-11-27 | Stop reason: HOSPADM

## 2024-11-25 RX ORDER — PANTOPRAZOLE SODIUM 40 MG/1
40 TABLET, DELAYED RELEASE ORAL
Status: DISCONTINUED | OUTPATIENT
Start: 2024-11-25 | End: 2024-11-27 | Stop reason: HOSPADM

## 2024-11-25 RX ORDER — ACETAMINOPHEN 325 MG/1
975 TABLET ORAL EVERY 6 HOURS PRN
Status: DISCONTINUED | OUTPATIENT
Start: 2024-11-25 | End: 2024-11-27 | Stop reason: HOSPADM

## 2024-11-25 RX ORDER — FLUTICASONE PROPIONATE 50 MCG
1 SPRAY, SUSPENSION (ML) NASAL DAILY
Status: DISCONTINUED | OUTPATIENT
Start: 2024-11-26 | End: 2024-11-27 | Stop reason: HOSPADM

## 2024-11-25 RX ADMIN — SACUBITRIL AND VALSARTAN 1 TABLET: 49; 51 TABLET, FILM COATED ORAL at 18:12

## 2024-11-25 RX ADMIN — GABAPENTIN 100 MG: 100 CAPSULE ORAL at 18:14

## 2024-11-25 RX ADMIN — AMOXICILLIN AND CLAVULANATE POTASSIUM 1 TABLET: 875; 125 TABLET, COATED ORAL at 18:12

## 2024-11-25 RX ADMIN — LEVOTHYROXINE SODIUM 75 MCG: 75 TABLET ORAL at 12:22

## 2024-11-25 RX ADMIN — APIXABAN 5 MG: 5 TABLET, FILM COATED ORAL at 18:14

## 2024-11-25 RX ADMIN — PANTOPRAZOLE SODIUM 40 MG: 40 TABLET, DELAYED RELEASE ORAL at 18:09

## 2024-11-25 RX ADMIN — ATORVASTATIN CALCIUM 80 MG: 80 TABLET, FILM COATED ORAL at 21:38

## 2024-11-25 RX ADMIN — DOFETILIDE 250 MCG: 0.25 CAPSULE ORAL at 18:09

## 2024-11-25 NOTE — H&P
H&P Exam - Electrophysiology  Vesna Langston 64 y.o. female MRN: [id number]  Unit/Bed#: [facility] 426-01 Encounter: [id number]    Assessment & Plan     Assessment:  Medtronic dual-chamber ICD in situ  - implanted by Dr. Lama on 3/27/2024 due to scar related VT and ischemic cardiomyopathy  Ventricular tachycardia  - felt to be scar mediated, cMRI done this admission to delineate where scar is  - being loaded with amiodarone  - beta blocker therapy of metoprolol succinate  Atrial flutter with some concern for atrial fibrillation  - anticoagulated with Eliquis / [id number] score of 4 (age, sex, CHF, DM)  - EF of 35% per echo 3/2024 /moderate dilation of left atrium noted  - rate control: metoprolol succinate  - antiarrhythmic therapy: amiodarone, see #2  - prior cardioversion: none  - prior ablation: none  STEMI  - cardiac cath 3/2024 showing single-vessel CAD with ostial total occlusion of LAD status post IVUS guided PCI to ostial/proximal LAD and placement of NATHAN  - had been felt this was due to chronic scar rather than acute thrombus given marked difficulty crossing total occlusion with a wire and resistance to opening with balloon dilation  - now started on aspirin, Plavix, atorvastatin, and metoprolol succinate (triple therapy with Eliquis for only 1 week post cath)  Ischemic cardiomyopathy  - EF of 35% per echo 3/23/2024, repeat echo still showing EF of 35%  - now started on GDMT of metoprolol succinate and Entresto  Newly diagnosed diabetes mellitus  Hyperlipidemia  Tobacco use  PE  - incidental finding right lower lob subsegmental PE 5/2024    Plan:  Patient presents today to undergo initiation of dofetilide.  She has stopped her amiodarone 11/3.  Transition of antiarrhythmics due to long-term toxicities associated with amiodarone.    Will get IVs, will order midline if patient is a tough stick, blood work and baseline EKG and will order dofetilide based off of all of these.  Received sure of getting a dose and waiting  2 hours afterwards to get EKG was advised to the patient.  We will do a 6 AM and 6 PM schedule to match her night shifts.    Will also figure out discharge planning earlier as we are getting to [place].      History of Present Illness   HPI:  Vesna Langston is a 64 y.o. year old female with STEMI status post NATHAN, ischemic cardiomyopathy, VT, and atrial arrhythmias, newly diagnosed diabetes mellitus, hyperlipidemia, and tobacco use.     Patient presented as a STEMI 3/22 transferred from [facility], [place] after she presented with syncopal episode and had PCI to proximal LAD.  She reported that she was getting steroid injection for lower back issues.  At that time patient was also noted to have arrhythmias, was given amiodarone bolus for ventricular tachycardia that was felt to be in the setting of ischemia and also once in the Cath Lab.  On presentation to the Cath Lab was also noted to be in 2:1 atrial flutter that converted during procedure but had received adenosine when at [facility], [place].     She has been doing well from a CAD standpoint. She also had small bursts of atrial flutter vs fib on telemety at the beginning of her stay - did not have them towards the end as she was on amiodarone.     She did undergo cMRI that showed concern for scar and then subsequently ICD implantation.     Since discharge she has had some issues with upper GI bleed - likely due to NSAIDs she was taking for back pain but also some component due to triple therapy. She has been watching what she is eating with great improvement in cholesterol.     Patient reports that she has been doing well.  She has walked a 5K, she does a lot more adventures with her grandkids.  She will need to go for back surgery but is still able to work (just does not lift heavy things).  Offers no cardiac complaints.    EKG:     Review of Systems  ROS as noted above, otherwise 12 point review of systems was performed and is negative.     Historical Information   Past Medical History:    Diagnosis Date    Acute respiratory insufficiency 2024    Allergic     Chronic HFrEF (heart failure with reduced ejection fraction) ([facility])     Coronary artery disease     COVID-19 virus infection 2022    Diabetes mellitus ([facility])     Disease of thyroid gland 2024    Heart disease 3/24    Hyperlipidemia     Hypoglycemia     ICD (implantable cardioverter-defibrillator) in place     Ischemic cardiomyopathy     Lactic acidosis 2024    Myocardial infarction ([facility]) 3/22/2024    Otitis media 1966    Seasonal allergies     ST elevation myocardial infarction involving left anterior descending (LAD) coronary artery ([facility]) 2024    Tobacco abuse     Visual impairment 1967     Past Surgical History:   Procedure Laterality Date    ADENOIDECTOMY      APPENDECTOMY      APPENDECTOMY LAPAROSCOPIC N/A 2024    Procedure: APPENDECTOMY LAPAROSCOPIC;  Surgeon: Lauryn Ullrich, DO;  Location: [facility];  Service: General    BACK SURGERY      BREAST EXCISIONAL BIOPSY Right 2000    cyst removed- benign    CARDIAC CATHETERIZATION N/A 2024    Procedure: Cardiac pci;  Surgeon: Gabriel Myers MD;  Location: [facility];  Service: Cardiology    CARDIAC CATHETERIZATION N/A 2024    Procedure: Cardiac Coronary Angiogram;  Surgeon: Gabriel Myers MD;  [facility];  Service: Cardiology    CARDIAC CATHETERIZATION N/A 2024    Procedure: Cardiac PCI AMI;  Surgeon: Gabriel Myers MD;  [facility];  Service: Cardiology    CARDIAC CATHETERIZATION N/A 2024    Procedure: Cardiac IVUS;  Surgeon: Gabriel Myers MD;  [facility];  Service: Cardiology    CARDIAC DEFIBRILLATOR PLACEMENT      CARDIAC ELECTROPHYSIOLOGY PROCEDURE N/A 2024    Procedure: Cardiac icd implant;  Surgeon: Jeffy Lama MD;  [facility];  Service: Cardiology     SECTION      COLONOSCOPY      ECTOPIC PREGNANCY SURGERY      INSERT / REPLACE /  REMOVE PACEMAKER      MASS EXCISION Left 10/18/2024    Procedure: EXCISION BIOPSY TISSUE LESION/MASS UPPER EXTREMITY - Left index finger;  Surgeon: Leandro Campos MD;  Location: [facility];  Service: Orthopedics    SEPTOPLASTY      SPINE SURGERY  23    TONSILLECTOMY       Family History:   Family History   Adopted: Yes   Problem Relation Age of Onset    Depression Mother     Heart disease Father     OCD Daughter        Social History   Social History     Substance and Sexual Activity   Alcohol Use Not Currently    Alcohol/week: 1.0 standard drink of alcohol    Types: 1 Shots of liquor per week    Comment: Every 2or 3months     Social History     Substance and Sexual Activity   Drug Use Never     Social History     Tobacco Use   Smoking Status Former    Current packs/day: 0.00    Average packs/day: 1 pack/day for 24.2 years (24.2 ttl pk-yrs)    Types: Cigarettes    Start date: 2000    Quit date: 3/22/2024    Years since quittin.6    Passive exposure: Never   Smokeless Tobacco Never   Tobacco Comments    Smoked 0.5-1 ppd; quit in 2024.        Meds/Allergies   all medications and allergies reviewed  Home Medications:   Medications Prior to Admission:     albuterol (ProAir HFA) 90 mcg/act inhaler    amoxicillin-clavulanate (AUGMENTIN) 875-125 mg per tablet    apixaban (ELIQUIS) 5 mg    atorvastatin (LIPITOR) 80 mg tablet    clopidogrel (PLAVIX) 75 mg tablet    diphenhydrAMINE (BENADRYL) 25 mg capsule    fluticasone (FLONASE) 50 mcg/act nasal spray    furosemide (LASIX) 20 mg tablet    gabapentin (Neurontin) 100 mg capsule    glimepiride (AMARYL) 1 mg tablet    HYDROcodone-acetaminophen (NORCO) 5-325 mg per tablet    isosorbide mononitrate (IMDUR) 30 mg 24 hr tablet    levothyroxine (Synthroid) 75 mcg tablet    meclizine (ANTIVERT) 25 mg tablet    metoprolol succinate (TOPROL-XL) 25 mg 24 hr tablet    pantoprazole (PROTONIX) 40 mg tablet    sacubitril-valsartan (Entresto) 49-51 MG TABS    spironolactone  "(ALDACTONE) 25 mg tablet    Allergies   Allergen Reactions    Fish-Derived Products - Food Allergy Anaphylaxis     Cannot have all seafood products    Shellfish-Derived Products - Food Allergy Anaphylaxis    Azithromycin Hives and Rash       Objective   Vitals: There were no vitals taken for this visit.      No intake or output data in the 24 hours ending 11/25/24 1002    Invasive Devices       None                   Physical Exam   GEN: NAD, alert and oriented, well appearing  SKIN: dry without significant lesions or rashes  HEENT: NCAT, PERRL, EOMs intact  NECK: No JVD appreciated  CARDIOVASCULAR: regular  LUNGS: Good respiratory effort with no audible wheezes  PSYCH: Normal mood and affect  NEURO: CN ll-Xll grossly intact      Lab Results: I have personally reviewed pertinent lab results.              Invalid input(s): \"LABGLOM\"              Imaging: Results Review Statement: No pertinent imaging studies reviewed.  No results found for this or any previous visit.      Code Status: Level 1 - Full Code    ** Please Note: Dictation voice to text software may have been used in the creation of this document. **    "

## 2024-11-26 LAB
ANION GAP SERPL CALCULATED.3IONS-SCNC: 8 MMOL/L (ref 4–13)
ATRIAL RATE: 61 BPM
ATRIAL RATE: 62 BPM
ATRIAL RATE: 65 BPM
BUN SERPL-MCNC: 15 MG/DL (ref 5–25)
CALCIUM SERPL-MCNC: 9.1 MG/DL (ref 8.4–10.2)
CHLORIDE SERPL-SCNC: 104 MMOL/L (ref 96–108)
CO2 SERPL-SCNC: 26 MMOL/L (ref 21–32)
CREAT SERPL-MCNC: 0.92 MG/DL (ref 0.6–1.3)
GFR SERPL CREATININE-BSD FRML MDRD: 65 ML/MIN/1.73SQ M
GLUCOSE SERPL-MCNC: 100 MG/DL (ref 65–140)
GLUCOSE SERPL-MCNC: 116 MG/DL (ref 65–140)
GLUCOSE SERPL-MCNC: 126 MG/DL (ref 65–140)
GLUCOSE SERPL-MCNC: 130 MG/DL (ref 65–140)
GLUCOSE SERPL-MCNC: 83 MG/DL (ref 65–140)
MAGNESIUM SERPL-MCNC: 2.3 MG/DL (ref 1.9–2.7)
P AXIS: 55 DEGREES
P AXIS: 57 DEGREES
P AXIS: 63 DEGREES
POTASSIUM SERPL-SCNC: 4.1 MMOL/L (ref 3.5–5.3)
PR INTERVAL: 174 MS
PR INTERVAL: 176 MS
PR INTERVAL: 198 MS
QRS AXIS: 38 DEGREES
QRS AXIS: 42 DEGREES
QRS AXIS: 46 DEGREES
QRSD INTERVAL: 86 MS
QRSD INTERVAL: 86 MS
QRSD INTERVAL: 96 MS
QT INTERVAL: 470 MS
QT INTERVAL: 484 MS
QT INTERVAL: 514 MS
QTC INTERVAL: 488 MS
QTC INTERVAL: 489 MS
QTC INTERVAL: 522 MS
SODIUM SERPL-SCNC: 138 MMOL/L (ref 135–147)
T WAVE AXIS: 75 DEGREES
T WAVE AXIS: 76 DEGREES
T WAVE AXIS: 80 DEGREES
VENTRICULAR RATE: 61 BPM
VENTRICULAR RATE: 62 BPM
VENTRICULAR RATE: 65 BPM

## 2024-11-26 PROCEDURE — 93005 ELECTROCARDIOGRAM TRACING: CPT

## 2024-11-26 PROCEDURE — 05H933Z INSERTION OF INFUSION DEVICE INTO RIGHT BRACHIAL VEIN, PERCUTANEOUS APPROACH: ICD-10-PCS | Performed by: INTERNAL MEDICINE

## 2024-11-26 PROCEDURE — 82948 REAGENT STRIP/BLOOD GLUCOSE: CPT

## 2024-11-26 PROCEDURE — 80048 BASIC METABOLIC PNL TOTAL CA: CPT | Performed by: PHYSICIAN ASSISTANT

## 2024-11-26 PROCEDURE — 99232 SBSQ HOSP IP/OBS MODERATE 35: CPT | Performed by: INTERNAL MEDICINE

## 2024-11-26 PROCEDURE — 83735 ASSAY OF MAGNESIUM: CPT | Performed by: PHYSICIAN ASSISTANT

## 2024-11-26 PROCEDURE — 93010 ELECTROCARDIOGRAM REPORT: CPT | Performed by: STUDENT IN AN ORGANIZED HEALTH CARE EDUCATION/TRAINING PROGRAM

## 2024-11-26 RX ORDER — FUROSEMIDE 20 MG/1
20 TABLET ORAL
Status: DISCONTINUED | OUTPATIENT
Start: 2024-11-26 | End: 2024-11-27 | Stop reason: HOSPADM

## 2024-11-26 RX ORDER — FUROSEMIDE 40 MG/1
40 TABLET ORAL DAILY
Status: DISCONTINUED | OUTPATIENT
Start: 2024-11-26 | End: 2024-11-27 | Stop reason: HOSPADM

## 2024-11-26 RX ORDER — DOFETILIDE 0.12 MG/1
125 CAPSULE ORAL EVERY 12 HOURS SCHEDULED
Status: DISCONTINUED | OUTPATIENT
Start: 2024-11-26 | End: 2024-11-27 | Stop reason: HOSPADM

## 2024-11-26 RX ORDER — DOFETILIDE 0.12 MG/1
125 CAPSULE ORAL EVERY 12 HOURS SCHEDULED
Qty: 60 CAPSULE | Refills: 0 | Status: SHIPPED | OUTPATIENT
Start: 2024-11-26 | End: 2024-12-09 | Stop reason: SDUPTHER

## 2024-11-26 RX ADMIN — ACETAMINOPHEN 975 MG: 325 TABLET, FILM COATED ORAL at 17:01

## 2024-11-26 RX ADMIN — PANTOPRAZOLE SODIUM 40 MG: 40 TABLET, DELAYED RELEASE ORAL at 17:06

## 2024-11-26 RX ADMIN — PANTOPRAZOLE SODIUM 40 MG: 40 TABLET, DELAYED RELEASE ORAL at 06:08

## 2024-11-26 RX ADMIN — CLOPIDOGREL BISULFATE 75 MG: 75 TABLET ORAL at 08:45

## 2024-11-26 RX ADMIN — ACETAMINOPHEN 975 MG: 325 TABLET, FILM COATED ORAL at 05:49

## 2024-11-26 RX ADMIN — SPIRONOLACTONE 25 MG: 25 TABLET ORAL at 08:46

## 2024-11-26 RX ADMIN — DOFETILIDE 250 MCG: 0.25 CAPSULE ORAL at 05:49

## 2024-11-26 RX ADMIN — APIXABAN 5 MG: 5 TABLET, FILM COATED ORAL at 18:01

## 2024-11-26 RX ADMIN — ISOSORBIDE MONONITRATE 30 MG: 30 TABLET, EXTENDED RELEASE ORAL at 08:46

## 2024-11-26 RX ADMIN — APIXABAN 5 MG: 5 TABLET, FILM COATED ORAL at 08:46

## 2024-11-26 RX ADMIN — AMOXICILLIN AND CLAVULANATE POTASSIUM 1 TABLET: 875; 125 TABLET, COATED ORAL at 18:03

## 2024-11-26 RX ADMIN — FUROSEMIDE 20 MG: 20 TABLET ORAL at 17:05

## 2024-11-26 RX ADMIN — SACUBITRIL AND VALSARTAN 1 TABLET: 49; 51 TABLET, FILM COATED ORAL at 18:02

## 2024-11-26 RX ADMIN — AMOXICILLIN AND CLAVULANATE POTASSIUM 1 TABLET: 875; 125 TABLET, COATED ORAL at 05:50

## 2024-11-26 RX ADMIN — METOPROLOL SUCCINATE 50 MG: 50 TABLET, EXTENDED RELEASE ORAL at 18:01

## 2024-11-26 RX ADMIN — FLUTICASONE PROPIONATE 1 SPRAY: 50 SPRAY, METERED NASAL at 08:46

## 2024-11-26 RX ADMIN — ATORVASTATIN CALCIUM 80 MG: 80 TABLET, FILM COATED ORAL at 18:01

## 2024-11-26 RX ADMIN — FUROSEMIDE 40 MG: 40 TABLET ORAL at 08:46

## 2024-11-26 RX ADMIN — GABAPENTIN 100 MG: 100 CAPSULE ORAL at 08:45

## 2024-11-26 RX ADMIN — DOFETILIDE 125 MCG: 0.12 CAPSULE ORAL at 18:01

## 2024-11-26 RX ADMIN — LEVOTHYROXINE SODIUM 75 MCG: 75 TABLET ORAL at 05:49

## 2024-11-26 RX ADMIN — SACUBITRIL AND VALSARTAN 1 TABLET: 49; 51 TABLET, FILM COATED ORAL at 08:45

## 2024-11-26 RX ADMIN — GABAPENTIN 100 MG: 100 CAPSULE ORAL at 18:01

## 2024-11-26 NOTE — PROGRESS NOTES
Progress Note - Electrophysiology   Name: Vesna Langston 64 y.o. female I MRN: [id number]  Unit/Bed#: [facility] 426-01 I Date of Admission: 11/25/2024   Date of Service: 11/26/2024 I Hospital Day: 1     Assessment & Plan  History of sustained ventricular tachycardia  - felt to be scar mediated, cMRI done this admission to delineate where scar is  - had been on amio from March to November 2024, being started on dofetilide this admission  - beta blocker therapy of metoprolol succinate    Plan:  Patient is currently atrially paced. She has already received 2 doses of dofetilide at the current dose of 250mcg twice daily. Upon review of the last EKG, Qtc interval is long at exactly around 500msec and therefore will lower to 125mcg twice daily tonight. Will continue to load at this current dose and monitor EKG's two hours post dose. Will also draw AM labs to check electrolytes and telemetry as well which has not shown ventricular ectopy thus far.     Also discussed with attending potentially increasing her lower rate to 70.  Atrial flutter, paroxysmal (HCC)  - anticoagulated with Eliquis / [id number] score of 4 (age, sex, CHF, DM)  - EF of 35% per echo 3/2024 /moderate dilation of left atrium noted  - rate control: metoprolol succinate  - antiarrhythmic therapy: amiodarone, see #2  - prior cardioversion: none  - prior ablation: none  S/P ICD (internal cardiac defibrillator) procedure  - Medtronic dual-chamber ICD in situ, implanted by Dr. Lama on 3/27/2024 due to scar related VT and ischemic cardiomyopathy  Coronary artery disease involving native coronary artery of native heart  - presented as STEMI  - cardiac cath 3/2024 showing single-vessel CAD with ostial total occlusion of LAD status post IVUS guided PCI to ostial/proximal LAD and placement of NATHAN  - had been felt this was due to chronic scar rather than acute thrombus given marked difficulty crossing total occlusion with a wire and resistance to opening with balloon  dilation  - now started on aspirin, Plavix, atorvastatin, and metoprolol succinate (triple therapy with Eliquis for only 1 week post cath)  Ischemic cardiomyopathy  - EF of 35% per echo 3/23/2024, repeat echo 10/2024 showing EF of 40-45%  - now started on GDMT of metoprolol succinate and Entresto  - cMRI 3/2024 showing thinning and hypo- to akinesis of the mid anterior, anteroseptal and inferoseptal walls, the apical anterior, septal and inferior walls and the apex  Type 2 diabetes mellitus, without long-term current use of insulin ([facility])  Lab Results   Component Value Date    HGBA1C 7.2 (H) 09/27/2024       Recent Labs     11/25/24  1804 11/26/24  0802   POCGLU 91 100       Blood Sugar Average: Last 72 hrs:  (P) 95.5        Subjective/Objective   Subjective: Vesna Langston is a 64 y.o. year old female with STEMI status post NATHAN, ischemic cardiomyopathy, VT, and atrial arrhythmias, newly diagnosed diabetes mellitus, hyperlipidemia, and tobacco use . She is hospital stay day 2 and has been tolerating dofetilide well.     She reports that she does not have any side effects, was only woken up last night due to my CML battery not working.  Is itching to get back on the treadmill but has walked around the unit.    TELE: Sinus in the 60s and 70s with some atrial pacing    EKG:   After 2nd dose of dofetilide -      Objective:  Vitals: /66   Pulse 70   Temp 98 °F (36.7 °C) (Oral)   Resp 18   SpO2 98%     There were no vitals filed for this visit.  Orthostatic Blood Pressures      Flowsheet Row Most Recent Value   Blood Pressure 125/66 filed at 11/26/2024 0844   Patient Position - Orthostatic VS Lying filed at 11/26/2024 0729              Intake/Output Summary (Last 24 hours) at 11/26/2024 0904  Last data filed at 11/26/2024 0729  Gross per 24 hour   Intake 598 ml   Output 0 ml   Net 598 ml       Invasive Devices       Peripheral Intravenous Line  Duration             Peripheral IV 11/25/24 Right;Dorsal (posterior)  Forearm 1 day                              Scheduled Meds:  Current Facility-Administered Medications   Medication Dose Route Frequency Provider Last Rate    acetaminophen  975 mg Oral Q6H PRN Urmila Soden, PA-C      albuterol  2 puff Inhalation Q6H PRN Urmila Soden, PA-C      amoxicillin-clavulanate  1 tablet Oral Q12H MONA Urmila Soden, PA-C      apixaban  5 mg Oral BID Urmila Soden, PA-C      atorvastatin  80 mg Oral Daily With Dinner Urmila Soden, PA-C      clopidogrel  75 mg Oral Daily Urmila Soden, PA-C      diphenhydrAMINE  25 mg Oral Q6H PRN Urmila Soden, PA-C      docusate sodium  100 mg Oral BID PRN Urmila Soden, PA-C      dofetilide  250 mcg Oral Q12H MONA Urmila Soden, PA-C      fluticasone  1 spray Nasal Daily Urmila Soden, PA-C      furosemide  20 mg Oral Before Dinner Genaro Garcia MD      furosemide  40 mg Oral Daily Genaro Garcia MD      gabapentin  100 mg Oral BID Urmila Soden, PA-C      isosorbide mononitrate  30 mg Oral Daily Urmila Soden, PA-C      levothyroxine  75 mcg Oral Early Morning Urmila Soden, PA-C      meclizine  25 mg Oral Q8H PRN Urmila Soden, PA-C      melatonin  3 mg Oral HS PRN Urmila Soden, PA-C      metoprolol succinate  50 mg Oral HS Urmila Soden, PA-C      pantoprazole  40 mg Oral BID AC Urmila Soden, PA-C      sacubitril-valsartan  1 tablet Oral BID Urmila Soden, PA-C      spironolactone  25 mg Oral Daily Urmila Soden, PA-C       Continuous Infusions:   PRN Meds:.  acetaminophen    albuterol    diphenhydrAMINE    docusate sodium    meclizine    melatonin    Review of Systems:  ROS as noted above, otherwise 12 point review of systems was performed and is negative.     Physical Exam:   GEN: NAD, alert and oriented, well appearing  SKIN: dry without significant lesions or rashes  HEENT: NCAT, PERRL, EOMs intact  NECK: No JVD appreciated  CARDIOVASCULAR: regular  LUNGS: Good respiratory effort with no audible wheezes  PSYCH: Normal mood and affect  NEURO: CN ll-Xll grossly intact                 Lab Results: I have personally reviewed pertinent lab results.    Results from last 7 days   Lab Units 11/25/24  1035   WBC Thousand/uL 9.05   HEMOGLOBIN g/dL 11.2*   HEMATOCRIT % 35.9   PLATELETS Thousands/uL 299     Results from last 7 days   Lab Units 11/26/24  0518 11/25/24  1035   POTASSIUM mmol/L 4.1 4.9   CHLORIDE mmol/L 104 105   CO2 mmol/L 26 18*   BUN mg/dL 15 22   CREATININE mg/dL 0.92 1.08   CALCIUM mg/dL 9.1 8.7         Results from last 7 days   Lab Units 11/26/24  0518 11/25/24  1035   MAGNESIUM mg/dL 2.3 2.2       Imaging: Results Review Statement: No pertinent imaging studies reviewed.  No results found for this or any previous visit.      VTE Pharmacologic Prophylaxis: VTE covered by:  apixaban, Oral, 5 mg at 11/26/24 0846     VTE Mechanical Prophylaxis: sequential compression device

## 2024-11-26 NOTE — ASSESSMENT & PLAN NOTE
- anticoagulated with Eliquis / [id number] score of 4 (age, sex, CHF, DM)  - EF of 35% per echo 3/2024 /moderate dilation of left atrium noted  - rate control: metoprolol succinate  - antiarrhythmic therapy: amiodarone, see #2  - prior cardioversion: none  - prior ablation: none

## 2024-11-26 NOTE — PROCEDURES
Insert Complex Venous Access Line    Date/Time: 11/26/2024 3:04 PM    Performed by: Ashlie Christopher RN  Authorized by: Urmila Garcia PA-C    Patient location:  Bedside  Other Assisting Provider: Yes (comment) (Shelby BRITO, OBDULIO PEREZ)    Consent:     Consent obtained: Not required.  Universal protocol:     Procedure explained and questions answered to patient or proxy's satisfaction: yes      Immediately prior to procedure, a time out was called: yes      Site/side marked: yes      Patient identity confirmed:  Verbally with patient, arm band, provided demographic data and hospital-assigned identification number  Pre-procedure details:     Hand hygiene: Hand hygiene performed prior to insertion      Sterile barrier technique: All elements of maximal sterile technique followed      Skin preparation:  ChloraPrep    Skin preparation agent: Skin preparation agent completely dried prior to procedure    Procedure details:     Complex Venous Access Line Type: Midline      Complex Venous Access Line Indications: no peripheral vascular access      Orientation:  Right    Location:  Basilic    Catheter size:  4 Fr    Total catheter length (cm):  20    Catheter out on skin (cm):  0    Max flow rate:  999    Arm circumference:  34.5    Patient evaluated for contraindications to access (i.e. fistula, thrombosis, etc): Yes      Site selection rationale:  Largest most patent vessel    Approach: percutaneous technique used      Patient position:  Flat    Ultrasound image availability:  Not saved    Sterile ultrasound techniques: Sterile gel and sterile probe covers were used      Number of attempts:  1    Successful placement: yes      Landmarks identified: yes    Anesthesia (see MAR for exact dosages):     Anesthesia method:  Local infiltration    Local anesthetic:  Lidocaine 1% w/o epi (2 ml)  Post-procedure details:     Post-procedure:  Dressing applied and securement device placed    Assessment:  Blood return through all ports and free  fluid flow    Post-procedure complications: none      Patient tolerance of procedure:  Tolerated well, no immediate complications      Midline requested by MD    Ref [id number]  Lot [id number]  Exp 2025-08-31

## 2024-11-26 NOTE — ASSESSMENT & PLAN NOTE
- EF of 35% per echo 3/23/2024, repeat echo 10/2024 showing EF of 40-45%  - now started on GDMT of metoprolol succinate and Entresto  - cMRI 3/2024 showing thinning and hypo- to akinesis of the mid anterior, anteroseptal and inferoseptal walls, the apical anterior, septal and inferior walls and the apex

## 2024-11-26 NOTE — ASSESSMENT & PLAN NOTE
- Medtronic dual-chamber ICD in situ, implanted by Dr. Lama on 3/27/2024 due to scar related VT and ischemic cardiomyopathy

## 2024-11-26 NOTE — UTILIZATION REVIEW
Initial Clinical Review    Admission: Date/Time/Statement:   Admission Orders (From admission, onward)       Ordered        11/25/24 0956  INPATIENT ADMISSION  Once                          Orders Placed This Encounter   Procedures    INPATIENT ADMISSION     Standing Status:   Standing     Number of Occurrences:   1     Level of Care:   Med Surg [16]              telemetry     Estimated length of stay:   More than 2 Midnights     Certification:   I certify that inpatient services are medically necessary for this patient for a duration of greater than two midnights. See H&P and MD Progress Notes for additional information about the patient's course of treatment.     Initial Presentation: 64 y.o. female who presented self from home to [facility] as a direct admission. Admitted as Inpatient to cardiology service for initiation of dofetilide. PMHx: HFrEF, CAD, COVID-19 (11/2022), T2DM, HLD, ICD, STEMI s/p NATHAN, ischemic cardiomyopathy, VT and atrial arrhythmias. Presented for initiation of dofetilide. Stopped amiodarone 11/3, transition off antiarrhythmic d/t long-term toxicities associated w/ amiodarone. EXAM: dry skin. PLAN: obtain baseline labs and EKG, start dofetilide, EKG 2 hours after administration, telemetry, continue other current meds, I&O, Trend labs, replete electrolytes as needed.     Date: 11/26/24   Day 2: Pt reports no difficulty with induction of dofetilide at this time. Telemetry: sinus in 60-70s with some atrial pacing. Pt has been receiving 250 mcg dose BID for a total of 2 doses; QTc after second dose 500. Plan: Lower dose to 125 mcg BID starting with next dose, this evening. EKG 2 hours post-doses. Trend labs, replete electrolytes as needed. Telemetry. Continue other current meds.       Scheduled Medications:  amoxicillin-clavulanate, 1 tablet, Oral, Q12H MNOA  apixaban, 5 mg, Oral, BID  atorvastatin, 80 mg, Oral, Daily With Dinner  clopidogrel, 75 mg, Oral,  Daily  dofetilide, 250 mcg, Oral, Q12H MONA  fluticasone, 1 spray, Nasal, Daily  furosemide, 20 mg, Oral, Before Dinner  furosemide, 40 mg, Oral, Daily  gabapentin, 100 mg, Oral, BID  isosorbide mononitrate, 30 mg, Oral, Daily  levothyroxine, 75 mcg, Oral, Early Morning  metoprolol succinate, 50 mg, Oral, HS  pantoprazole, 40 mg, Oral, BID AC  sacubitril-valsartan, 1 tablet, Oral, BID  spironolactone, 25 mg, Oral, Daily    Continuous IV Infusions: none    PRN Meds:  acetaminophen, 975 mg, Oral, Q6H PRN; 11/26 x1  albuterol, 2 puff, Inhalation, Q6H PRN  diphenhydrAMINE, 25 mg, Oral, Q6H PRN  docusate sodium, 100 mg, Oral, BID PRN  meclizine, 25 mg, Oral, Q8H PRN  melatonin, 3 mg, Oral, HS PRN      ED Triage Vitals   Temperature Pulse Respirations Blood Pressure SpO2 Pain Score   11/25/24 1005 11/25/24 1005 11/25/24 1935 11/25/24 1005 11/25/24 1005 11/25/24 1016   97.9 °F (36.6 °C) 84 18 130/59 95 % No Pain       Vital Signs (last 3 days)       Date/Time Temp Pulse Resp BP MAP (mmHg) SpO2 O2 Device Pain    11/26/24 08:44:17 -- 70 -- 125/66 86 98 % -- --    11/26/24 07:29:32 98 °F (36.7 °C) 62 18 125/71 89 98 % None (Room air) --    11/26/24 0549 -- -- -- -- -- -- -- 4    11/26/24 02:45:31 -- 67 16 102/43 63 96 % -- --    11/26/24 0035 -- -- -- -- -- -- None (Room air) No Pain    11/26/24 00:34:03 97.6 °F (36.4 °C) 67 -- 117/65 82 96 % -- --    11/25/24 22:23:30 97.6 °F (36.4 °C) 65 18 97/53 68 96 % None (Room air) --    11/25/24 21:37:41 -- 72 -- 90/44 59 95 % -- --    11/25/24 1935 98.2 °F (36.8 °C) 62 18 96/56 69 96 % None (Room air) --    11/25/24 1811 -- -- -- 137/71 -- -- -- --    11/25/24 15:13:17 98.2 °F (36.8 °C) 68 -- 113/56 75 96 % -- --    11/25/24 1302 -- -- -- -- -- -- -- No Pain    11/25/24 1016 -- -- -- -- -- -- -- No Pain    11/25/24 10:05:32 97.9 °F (36.6 °C) 84 -- 130/59 83 95 % None (Room air) --              Pertinent Labs/Diagnostic Test Results:   EKG:   After 2nd dose of dofetilide  -          Results from last 7 days   Lab Units 11/25/24  1035   WBC Thousand/uL 9.05   HEMOGLOBIN g/dL 11.2*   HEMATOCRIT % 35.9   PLATELETS Thousands/uL 299   TOTAL NEUT ABS Thousands/µL 5.76         Results from last 7 days   Lab Units 11/26/24  0518 11/25/24  1035   SODIUM mmol/L 138 138   POTASSIUM mmol/L 4.1 4.9   CHLORIDE mmol/L 104 105   CO2 mmol/L 26 18*   ANION GAP mmol/L 8 15*   BUN mg/dL 15 22   CREATININE mg/dL 0.92 1.08   EGFR ml/min/1.73sq m 65 53   CALCIUM mg/dL 9.1 8.7   MAGNESIUM mg/dL 2.3 2.2         Results from last 7 days   Lab Units 11/26/24  0802 11/25/24  1804   POC GLUCOSE mg/dl 100 91     Results from last 7 days   Lab Units 11/26/24  0518 11/25/24  1035   GLUCOSE RANDOM mg/dL 83 153*         Past Medical History:   Diagnosis Date    Acute respiratory insufficiency 03/22/2024    Allergic     Chronic HFrEF (heart failure with reduced ejection fraction) ([facility])     Coronary artery disease     COVID-19 virus infection 11/28/2022    Diabetes mellitus ([facility])     Disease of thyroid gland 4/09/2024    Heart disease 3/24    Hyperlipidemia     Hypoglycemia     ICD (implantable cardioverter-defibrillator) in place     Ischemic cardiomyopathy     Lactic acidosis 03/22/2024    Myocardial infarction ([facility]) 3/22/2024    Otitis media 1966    Seasonal allergies     ST elevation myocardial infarction involving left anterior descending (LAD) coronary artery ([facility]) 03/22/2024    Tobacco abuse     Visual impairment 1967     Present on Admission:   Atrial flutter, paroxysmal ([facility])   S/P ICD (internal cardiac defibrillator) procedure   Coronary artery disease involving native coronary artery of native heart   Ischemic cardiomyopathy   Type 2 diabetes mellitus, without long-term current use of insulin ([facility])      Admitting Diagnosis: Atrial flutter, paroxysmal ([facility]) [I48.92]  Age/Sex: 64 y.o. female    Network Utilization Review Department  ATTENTION: Please call with any questions or concerns to [phone] and  carefully listen to the prompts so that you are directed to the right person. All voicemails are confidential.   For Discharge needs, contact Care Management DC Support Team at [phone] opt. [phone]  Send all requests for admission clinical reviews, approved or denied determinations and any other requests to dedicated fax number below belonging to the campus where the patient is receiving treatment. List of dedicated fax numbers for the Facilities:  FACILITY NAME UR FAX NUMBER   ADMISSION DENIALS (Administrative/Medical Necessity) [phone]   DISCHARGE SUPPORT TEAM (NETWORK) [phone]   PARENT CHILD HEALTH (Maternity/NICU/Pediatrics) [phone]   [facility] [phone]   [facility] [phone]   [facility] [phone]   [facility] [phone]   [facility] [phone]   [facility] [phone]   [facility] [phone]   [facility] [phone]   [facility] [phone]   [facility] [phone]   [facility] [phone]   [facility] [phone]          Walk in

## 2024-11-26 NOTE — ASSESSMENT & PLAN NOTE
- presented as STEMI  - cardiac cath 3/2024 showing single-vessel CAD with ostial total occlusion of LAD status post IVUS guided PCI to ostial/proximal LAD and placement of NATHAN  - had been felt this was due to chronic scar rather than acute thrombus given marked difficulty crossing total occlusion with a wire and resistance to opening with balloon dilation  - now started on aspirin, Plavix, atorvastatin, and metoprolol succinate (triple therapy with Eliquis for only 1 week post cath)

## 2024-11-26 NOTE — CASE MANAGEMENT
Case Management Assessment & Discharge Planning Note    Patient name Vesna Langston  Location [facility]-01 MRN [id number]  : 1960 Date 2024       Current Admission Date: 2024  Current Admission Diagnosis:History of sustained ventricular tachycardia   Patient Active Problem List    Diagnosis Date Noted Date Diagnosed    Primary osteoarthritis of right knee 10/08/2024     History of torn meniscus of right knee 10/08/2024     Chronic pain of right knee 10/08/2024     Localized edema 10/02/2024     POP (obstructive sleep apnea) 2024     Class 1 obesity due to excess calories with serious comorbidity and body mass index (BMI) of 34.0 to 34.9 in adult 2024     Ganglion cyst of finger of left hand 08/15/2024     History of pulmonary embolism 2024     History of gastric ulcer 2024     Generalized anxiety disorder 2024     Anemia due to acute blood loss 2024     Acquired hypothyroidism 2024     Constipation 04/10/2024     Type 2 diabetes mellitus, without long-term current use of insulin ([facility]) 2024     Syncope 2024     S/P ICD (internal cardiac defibrillator) procedure 2024     Chronic low back pain 2024     HFrEF (heart failure with reduced ejection fraction) ([facility]) 2024     Ischemic cardiomyopathy 2024     Coronary artery disease involving native coronary artery of native heart 2024     S/P coronary artery stent placement 2024     Atrial flutter, paroxysmal ([facility]) 2024     Mixed hyperlipidemia 2024     History of tobacco abuse 2024     History of sustained ventricular tachycardia 2024     Back pain 2022     Sciatica of left side 2022       LOS (days): 1  Geometric Mean LOS (GMLOS) (days):   Days to GMLOS:     OBJECTIVE:    Risk of Unplanned Readmission Score: 19.48         Current admission status: Inpatient       Preferred Pharmacy:   [facility]/pharmacy #[id number] - [place] -  [address]  [address] [address]  Phone: [phone] Fax: [phone]    [facility]/pharmacy #[id number] [place] - [address]  [address] [place] [address]  Phone: [phone] Fax: [phone]    Primary Care Provider: Melissa Eldridge DO    Primary Insurance: BLUE CROSS  Secondary Insurance: MEDICARE    ASSESSMENT:  Active Health Care Proxies    There are no active Health Care Proxies on file.         Readmission Root Cause  30 Day Readmission: No    Patient Information  Admitted from:: Home  Mental Status: Alert  During Assessment patient was accompanied by: Not accompanied during assessment  Assessment information provided by:: Patient  Primary Caregiver: Self  Support Systems: Self, Family members, Friend  Type of Current Residence: 3 story home  Upon entering residence, is there a bedroom on the main floor (no further steps)?: No  A bedroom is located on the following floor levels of residence (select all that apply):: 2nd Floor  Upon entering residence, is there a bathroom on the main floor (no further steps)?: Yes  Number of steps to 2nd floor from main floor: One Flight    Activities of Daily Living Prior to Admission  Functional Status: Independent  Completes ADLs independently?: Yes  Ambulates independently?: Yes  Does patient use assisted devices?: No  Does patient currently own DME?: Yes  What DME does the patient currently own?: Walker, Straight Cane  Does patient have a history of Outpatient Therapy (PT/OT)?: Yes  Does the patient have a history of Short-Term Rehab?: No  Does patient have a history of HHC?: No  Does patient currently have HHC?: No         Patient Information Continued  Income Source: Employed  Does patient have prescription coverage?: Yes  Does patient receive dialysis treatments?: No  Does patient have a history of substance abuse?: No  Does patient have a history of Mental Health Diagnosis?: No         Means of Transportation  Means  of Transport to Appts:: Drives Self          DISCHARGE DETAILS:    Discharge planning discussed with:: Patient  Freedom of Choice: Yes     CM contacted family/caregiver?: No- see comments (Patient alert and oriented)       Additional Comments: CM met with patient at bedside and introduced self and explained role. Patient reports that she lives in [address] [place] [address]. Patient reports that she was independent prior to admission. Patient reports that she uses a walker and cane as needed. Patient reports that she works full time and drives.

## 2024-11-26 NOTE — ASSESSMENT & PLAN NOTE
- felt to be scar mediated, cMRI done this admission to delineate where scar is  - had been on amio from March to November 2024, being started on dofetilide this admission  - beta blocker therapy of metoprolol succinate    Plan:  Patient is currently atrially paced. She has already received 2 doses of dofetilide at the current dose of 250mcg twice daily. Upon review of the last EKG, Qtc interval is long at exactly around 500msec and therefore will lower to 125mcg twice daily tonight. Will continue to load at this current dose and monitor EKG's two hours post dose. Will also draw AM labs to check electrolytes and telemetry as well which has not shown ventricular ectopy thus far.     Also discussed with attending potentially increasing her lower rate to 70.

## 2024-11-26 NOTE — ASSESSMENT & PLAN NOTE
Lab Results   Component Value Date    HGBA1C 7.2 (H) 09/27/2024       Recent Labs     11/25/24  1804 11/26/24  0802   POCGLU 91 100       Blood Sugar Average: Last 72 hrs:  (P) 95.5

## 2024-11-26 NOTE — DISCHARGE INSTR - AVS FIRST PAGE
MEDICATION CHANGES AFTER BEING STARTED ON TIKOSYN (DOFETILIDE):  Please start taking dofetilide 125mcg twice daily. Keep all other medications the same.    FOLLOW UP:  After starting this new medication, you will need to come to one of our cardiology offices for a 1 week EKG appointment. Please see further discharge instructions for this appointment time and location. If you have any issues with the timing, please call our schedulers at [phone] if appointment was made at [place] - otherwise the number listed above the appointment time at the [facility].    At this one week EKG appointment, please ask the medical assistant or nurse taking care of you to transfer your prescription over from the hospital pharmacy ([facility]) to your home pharmacy that you normally use if this has not already been done already.     You will have a follow up in the EP office in about 4-6 weeks. If you have any questions or concerns prior to this visit, please contact Dr. Lama's office at [phone]. If you have any significant issues after hours or on the weekends, please call the on call cardiology number at [phone].    COMMON MEDICATION INTERACTION SHEET:  Please refer to this medication list prior to beginning any new medications while on Tikosyn:

## 2024-11-27 ENCOUNTER — TRANSITIONAL CARE MANAGEMENT (OUTPATIENT)
Dept: FAMILY MEDICINE CLINIC | Facility: CLINIC | Age: 64
End: 2024-11-27

## 2024-11-27 VITALS
RESPIRATION RATE: 18 BRPM | OXYGEN SATURATION: 95 % | HEART RATE: 70 BPM | TEMPERATURE: 97.4 F | DIASTOLIC BLOOD PRESSURE: 64 MMHG | SYSTOLIC BLOOD PRESSURE: 123 MMHG

## 2024-11-27 LAB
ANION GAP SERPL CALCULATED.3IONS-SCNC: 8 MMOL/L (ref 4–13)
ATRIAL RATE: 70 BPM
ATRIAL RATE: 70 BPM
BUN SERPL-MCNC: 18 MG/DL (ref 5–25)
CALCIUM SERPL-MCNC: 9.4 MG/DL (ref 8.4–10.2)
CHLORIDE SERPL-SCNC: 102 MMOL/L (ref 96–108)
CO2 SERPL-SCNC: 29 MMOL/L (ref 21–32)
CREAT SERPL-MCNC: 1.12 MG/DL (ref 0.6–1.3)
GFR SERPL CREATININE-BSD FRML MDRD: 51 ML/MIN/1.73SQ M
GLUCOSE SERPL-MCNC: 101 MG/DL (ref 65–140)
GLUCOSE SERPL-MCNC: 113 MG/DL (ref 65–140)
MAGNESIUM SERPL-MCNC: 2.3 MG/DL (ref 1.9–2.7)
P AXIS: 36 DEGREES
P AXIS: 65 DEGREES
POTASSIUM SERPL-SCNC: 4.6 MMOL/L (ref 3.5–5.3)
PR INTERVAL: 170 MS
PR INTERVAL: 210 MS
QRS AXIS: 39 DEGREES
QRS AXIS: 52 DEGREES
QRSD INTERVAL: 94 MS
QRSD INTERVAL: 94 MS
QT INTERVAL: 490 MS
QT INTERVAL: 496 MS
QTC INTERVAL: 529 MS
QTC INTERVAL: 536 MS
SODIUM SERPL-SCNC: 139 MMOL/L (ref 135–147)
T WAVE AXIS: 72 DEGREES
T WAVE AXIS: 76 DEGREES
VENTRICULAR RATE: 70 BPM
VENTRICULAR RATE: 70 BPM

## 2024-11-27 PROCEDURE — 93010 ELECTROCARDIOGRAM REPORT: CPT | Performed by: INTERNAL MEDICINE

## 2024-11-27 PROCEDURE — 82948 REAGENT STRIP/BLOOD GLUCOSE: CPT

## 2024-11-27 PROCEDURE — 93005 ELECTROCARDIOGRAM TRACING: CPT

## 2024-11-27 PROCEDURE — 80048 BASIC METABOLIC PNL TOTAL CA: CPT | Performed by: PHYSICIAN ASSISTANT

## 2024-11-27 PROCEDURE — 99239 HOSP IP/OBS DSCHRG MGMT >30: CPT | Performed by: INTERNAL MEDICINE

## 2024-11-27 PROCEDURE — 83735 ASSAY OF MAGNESIUM: CPT | Performed by: PHYSICIAN ASSISTANT

## 2024-11-27 RX ADMIN — SPIRONOLACTONE 25 MG: 25 TABLET ORAL at 09:30

## 2024-11-27 RX ADMIN — SACUBITRIL AND VALSARTAN 1 TABLET: 49; 51 TABLET, FILM COATED ORAL at 09:33

## 2024-11-27 RX ADMIN — AMOXICILLIN AND CLAVULANATE POTASSIUM 1 TABLET: 875; 125 TABLET, COATED ORAL at 05:55

## 2024-11-27 RX ADMIN — CLOPIDOGREL BISULFATE 75 MG: 75 TABLET ORAL at 09:33

## 2024-11-27 RX ADMIN — ACETAMINOPHEN 975 MG: 325 TABLET, FILM COATED ORAL at 02:00

## 2024-11-27 RX ADMIN — DOFETILIDE 125 MCG: 0.12 CAPSULE ORAL at 05:54

## 2024-11-27 RX ADMIN — LEVOTHYROXINE SODIUM 75 MCG: 75 TABLET ORAL at 05:54

## 2024-11-27 RX ADMIN — GABAPENTIN 100 MG: 100 CAPSULE ORAL at 09:30

## 2024-11-27 RX ADMIN — PANTOPRAZOLE SODIUM 40 MG: 40 TABLET, DELAYED RELEASE ORAL at 05:56

## 2024-11-27 RX ADMIN — ISOSORBIDE MONONITRATE 30 MG: 30 TABLET, EXTENDED RELEASE ORAL at 09:30

## 2024-11-27 RX ADMIN — APIXABAN 5 MG: 5 TABLET, FILM COATED ORAL at 09:33

## 2024-11-27 RX ADMIN — FUROSEMIDE 40 MG: 40 TABLET ORAL at 09:33

## 2024-11-27 NOTE — ASSESSMENT & PLAN NOTE
Lab Results   Component Value Date    HGBA1C 7.2 (H) 09/27/2024       Recent Labs     11/26/24  1057 11/26/24  1631 11/26/24  2137 11/27/24  0742   POCGLU 126 116 130 113       Blood Sugar Average: Last 72 hrs:  (P) [id number]

## 2024-11-27 NOTE — DISCHARGE SUMMARY
Discharge Summary - Electrophysiology   Name: Vesna Foster y.o. female I MRN: [id number]  Unit/Bed#: [facility] 426-01 I Date of Admission: 11/25/2024   Date of Service: 11/27/2024 I Hospital Day: 2      Discharge Summary - Vesna Foster y.o. female MRN: [id number]    Unit/Bed#: [facility] 426-01 Encounter: [id number]      Admission Date: 11/25/2024   Discharge Date: 11/27/2024    Discharge Diagnosis:   Assessment & Plan  History of sustained ventricular tachycardia  - felt to be scar mediated, cMRI done this admission to delineate where scar is  - had been on amio from March to November 2024, transitioned to dofetilide 125mcg twice daily 11/2024 (Qtc ~500msec but confirmed with attending to keep at 125mcg BID with low rate of device set to 70)  - beta blocker therapy of metoprolol succinate  Atrial flutter, paroxysmal (HCC)  - anticoagulated with Eliquis / [id number] score of 4 (age, sex, CHF, DM)  - EF of 35% per echo 3/2024 /moderate dilation of left atrium noted  - rate control: metoprolol succinate  - antiarrhythmic therapy: dofetilide (switched from amio due to long term toxicities)  - prior cardioversion: none  - prior ablation: none  S/P ICD (internal cardiac defibrillator) procedure  - [employer]onic dual-chamber ICD in situ, implanted by Dr. Lama on 3/27/2024 due to scar related VT and ischemic cardiomyopathy  Coronary artery disease involving native coronary artery of native heart  - presented as STEMI  - cardiac cath 3/2024 showing single-vessel CAD with ostial total occlusion of LAD status post IVUS guided PCI to ostial/proximal LAD and placement of NATHAN  - had been felt this was due to chronic scar rather than acute thrombus given marked difficulty crossing total occlusion with a wire and resistance to opening with balloon dilation  - now started on aspirin, Plavix, atorvastatin, and metoprolol succinate (triple therapy with Eliquis for only 1 week post cath)  Ischemic cardiomyopathy  - EF of 35% per echo 3/23/2024,  repeat echo 10/2024 showing EF of 40-45%  - now started on GDMT of metoprolol succinate and Entresto  - cMRI 3/2024 showing thinning and hypo- to akinesis of the mid anterior, anteroseptal and inferoseptal walls, the apical anterior, septal and inferior walls and the apex  Type 2 diabetes mellitus, without long-term current use of insulin ([facility])  Lab Results   Component Value Date    HGBA1C 7.2 (H) 09/27/2024       Recent Labs     11/26/24  1057 11/26/24  1631 11/26/24  2137 11/27/24  0742   POCGLU 126 116 130 113       Blood Sugar Average: Last 72 hrs:  (P) [id number]          Procedures Performed: None  No orders of the defined types were placed in this encounter.      Consultants: None    HPI: Please refer to the initial history and physical as well as procedure notes for full details. Briefly, Vesna Langston is a 64 y.o. year old female with STEMI status post NATHAN, ischemic cardiomyopathy, VT, and atrial arrhythmias, newly diagnosed diabetes mellitus, hyperlipidemia, and tobacco use.      Patient presented as a STEMI 3/22 transferred from [facility] after she presented with syncopal episode and had PCI to proximal LAD.  She reported that she was getting steroid injection for lower back issues.  At that time patient was also noted to have arrhythmias, was given amiodarone bolus for ventricular tachycardia that was felt to be in the setting of ischemia and also once in the Cath Lab.  On presentation to the Cath Lab was also noted to be in 2:1 atrial flutter that converted during procedure but had received adenosine when at [facility].     She has been doing well from a CAD standpoint. She also had small bursts of atrial flutter vs fib on telemety at the beginning of her stay - did not have them towards the end as she was on amiodarone.     She did undergo cMRI that showed concern for scar and then subsequently ICD implantation.  She had been started on amiodarone for ventricular and atrial arrhythmia suppression.  She had  been on this for about 6 months, when seen in the office last by Dr. Lama, was felt that she would need to be transitioned to antiarrhythmic with less toxicities and therefore was advised to hold amiodarone 11/3. Patient presented this hospital admission to undergo initiation of dofetilide.     Hospital Course:   Vesna Langston is a 64 y.o. female who was admitted elective for antiarrhythmic medication initiation.  Patient was seen in the office by Dr. Lama and recommended to initiate therapy with dofetilide.   She has been on chronic anticoagulation therapy with Eliquis without missed dose.     She presents to the hospital in sinus rhythm.  She was initiated on dofetilide at 250 mcg twice daily. Serial EKGs were performed 2 hours after each dose of antiarrhythmic medication.  There was some QT prolongation and therefore she was lowered to 125 mcg twice daily and for low rate was increased to 70. There were no significant occurrence of ventricular ectopy on telemetry.  Electrolytes and renal function were monitored throughout her stay.  She underwent a total of 4 doses, with her last EKG as follows (last EKG was reviewed personally by attending and QT was felt to be a little less than 500 ms, recommendation was to obtain remote transmissions every month for 4 months and have them sent to Dr. Lama directly to ensure no ventricular arrhythmias):      Physical exam on the day of discharge was as follows:  GEN: NAD, alert and oriented, well appearing  SKIN: dry without significant lesions or rashes  HEENT: NCAT, PERRL, EOMs intact  NECK: No JVD or carotid bruits appreciated  CARDIOVASCULAR: RRR, normal S1, S2 without murmurs, rubs, or gallops appreciated  LUNGS: Clear to auscultation bilaterally without wheezes, rhonchi, or rales  ABDOMEN: Soft, nontender, nondistended  EXTREMITIES/VASCULAR: perfused without clubbing, cyanosis, or edema b/l  PSYCH: Normal mood and affect  NEURO: CN ll-Xll grossly intact     At time of  discharge, patient was ambulating the cardiac unit without complaints of  lightheadedness, presyncope, syncope, chest pain, shortness of breath, orthopnea, PND, palpitations, or bleeding problems. She received education regarding dofetilide therapy, avoiding missed doses, pharmacy moitoring for drug to drug interactions, and concerning signs and symptoms that would prompt urgent evaluation.  She was given anEKG appointment on Monday after starting dofetilide along with a follow up with Lance Ye PA-C in 4-6 weeks in the office.     In terms of patient's medications, no changes were made besides the addition of dofetilide 125 mcg twice daily.    She is stable for discharge at this time with all questions answered. She was discussed in detail with Dr. Lama who is in agreement with this discharge summary.     Discharge Medications:  See after visit summary for reconciled discharge medications provided to patient and family.      Medications Prior to Admission:     albuterol (ProAir HFA) 90 mcg/act inhaler    amoxicillin-clavulanate (AUGMENTIN) 875-125 mg per tablet    apixaban (ELIQUIS) 5 mg    atorvastatin (LIPITOR) 80 mg tablet    clopidogrel (PLAVIX) 75 mg tablet    diphenhydrAMINE (BENADRYL) 25 mg capsule    fluticasone (FLONASE) 50 mcg/act nasal spray    furosemide (LASIX) 20 mg tablet    gabapentin (Neurontin) 100 mg capsule    glimepiride (AMARYL) 1 mg tablet    HYDROcodone-acetaminophen (NORCO) 5-325 mg per tablet    isosorbide mononitrate (IMDUR) 30 mg 24 hr tablet    levothyroxine (Synthroid) 75 mcg tablet    meclizine (ANTIVERT) 25 mg tablet    metoprolol succinate (TOPROL-XL) 25 mg 24 hr tablet    pantoprazole (PROTONIX) 40 mg tablet    sacubitril-valsartan (Entresto) 49-51 MG TABS    spironolactone (ALDACTONE) 25 mg tablet      Pertininet Labs/diagnostics:  CBC with diff:   Results from last 7 days   Lab Units 11/25/24  1035   WBC Thousand/uL 9.05   HEMOGLOBIN g/dL 11.2*   HEMATOCRIT % 35.9   MCV fL 86    PLATELETS Thousands/uL 299   RBC Million/uL 4.18   MCH pg 26.8   MCHC g/dL 31.2*   RDW % 15.6*   MPV fL 9.2   NRBC AUTO /100 WBCs 0       BMP:  Results from last 7 days   Lab Units 11/27/24  0557 11/26/24  0518 11/25/24  1035   POTASSIUM mmol/L 4.6 4.1 4.9   CHLORIDE mmol/L 102 104 105   CO2 mmol/L 29 26 18*   BUN mg/dL 18 15 22   CREATININE mg/dL 1.12 0.92 1.08   CALCIUM mg/dL 9.4 9.1 8.7       Magnesium:   Results from last 7 days   Lab Units 11/27/24  0557 11/26/24  0518 11/25/24  1035   MAGNESIUM mg/dL 2.3 2.3 2.2       Coags:       Complications: none    Condition at Discharge: good     Discharge instructions/Information to patient and family:   See after visit summary for information provided to patient and family.      Provisions for Follow-Up Care:  See after visit summary for information related to follow-up care and any pertinent home health orders.      Disposition: Home    Planned Readmission: No    Discharge Statement   I spent 45 minutes minutes discharging the patient. This time was spent on the day of discharge. I had direct contact with the patient on the day of discharge. Additional documentation is required if more than 30 minutes were spent on discharge. Evaluating the incision, discussing discharge instructions and restrictions, arranging follow up appointments, discussing medications

## 2024-11-29 ENCOUNTER — NURSE TRIAGE (OUTPATIENT)
Age: 64
End: 2024-11-29

## 2024-11-29 NOTE — TELEPHONE ENCOUNTER
Spoke with PT. PT stated she is resting and feeling a little better. PT will contact the office if symptoms persist. PT will go to the ED if symptoms worsen.

## 2024-11-29 NOTE — TELEPHONE ENCOUNTER
"Reason for Conversation: Pt calling to report SOB of breath that developed after carrying heavy boxes down from her attic.  Pt took 3 baby aspirins and is sitting down now but feels lightheaded and nauseous.  Pt denies any chest pain or pressure.  She states she did feel this way after her heart attack.  Pt states if she doesn't feel better in a few minutes she will be going to the ER and wanted to let Dr Bell know.    VS/Weight: (Note: Please include date/time vitals/weight were measured)  not provided    Pain: No    Risk Factors: Devices, Diabetic, and Arrhythmia    Recent relevant testing and date of testing: Echo and Other EKG   11/27/24    Medication: tikosyn 125mg, lasix 20mg, eliquis 5 mg, [id number], imdur 30mg, plavix 75mg, metoprolol 25mg, spironolactone 25mg    Upcoming Office Visit: Yes 12/2/24 EKG visit    Last Office Visit: 11/11/24        Reason for Disposition   MODERATE difficulty breathing (e.g., speaks in phrases, SOB even at rest, pulse 100-120) of new-onset or worse than normal    Answer Assessment - Initial Assessment Questions  1. RESPIRATORY STATUS: \"Describe your breathing?\" (e.g., wheezing, shortness of breath, unable to speak, severe coughing)       Shortness of breath  2. ONSET: \"When did this breathing problem begin?\"       A few minutes ago   3. PATTERN \"Does the difficult breathing come and go, or has it been constant since it started?\"       Constant   4. SEVERITY: \"How bad is your breathing?\" (e.g., mild, moderate, severe)       Moderate   5. RECURRENT SYMPTOM: \"Have you had difficulty breathing before?\" If Yes, ask: \"When was the last time?\" and \"What happened that time?\"       Yes- when she had her heart attack   6. CARDIAC HISTORY: \"Do you have any history of heart disease?\" (e.g., heart attack, angina, bypass surgery, angioplasty)       Heart attack   7. LUNG HISTORY: \"Do you have any history of lung disease?\"  (e.g., pulmonary embolus, asthma, emphysema)      Denies   8. " "CAUSE: \"What do you think is causing the breathing problem?\"       Possible her heart   9. OTHER SYMPTOMS: \"Do you have any other symptoms?\" (e.g., chest pain, cough, dizziness, fever, runny nose)      Lightheaded and nausea    Protocols used: Breathing Difficulty-Adult-[place]    "

## 2024-11-30 ENCOUNTER — HOSPITAL ENCOUNTER (EMERGENCY)
Facility: HOSPITAL | Age: 64
Discharge: HOME | End: 2024-11-30
Attending: EMERGENCY MEDICINE
Payer: COMMERCIAL

## 2024-11-30 ENCOUNTER — PATIENT MESSAGE (OUTPATIENT)
Dept: CARDIOLOGY CLINIC | Facility: CLINIC | Age: 64
End: 2024-11-30

## 2024-11-30 ENCOUNTER — APPOINTMENT (EMERGENCY)
Dept: CT IMAGING | Facility: HOSPITAL | Age: 64
End: 2024-11-30
Payer: COMMERCIAL

## 2024-11-30 ENCOUNTER — APPOINTMENT (EMERGENCY)
Dept: RADIOLOGY | Facility: HOSPITAL | Age: 64
End: 2024-11-30
Payer: COMMERCIAL

## 2024-11-30 ENCOUNTER — NURSE TRIAGE (OUTPATIENT)
Dept: OTHER | Facility: OTHER | Age: 64
End: 2024-11-30

## 2024-11-30 VITALS
DIASTOLIC BLOOD PRESSURE: 57 MMHG | OXYGEN SATURATION: 99 % | SYSTOLIC BLOOD PRESSURE: 113 MMHG | TEMPERATURE: 97.9 F | BODY MASS INDEX: 34.69 KG/M2 | RESPIRATION RATE: 18 BRPM | WEIGHT: 228.18 LBS | HEART RATE: 69 BPM

## 2024-11-30 DIAGNOSIS — R06.02 SOB (SHORTNESS OF BREATH): ICD-10-CM

## 2024-11-30 DIAGNOSIS — R20.0 LEFT ARM NUMBNESS: Primary | ICD-10-CM

## 2024-11-30 PROBLEM — R20.2 NUMBNESS AND TINGLING IN LEFT ARM: Status: ACTIVE | Noted: 2024-11-30

## 2024-11-30 LAB
2HR DELTA HS TROPONIN: 0 NG/L
ALBUMIN SERPL BCG-MCNC: 4.6 G/DL (ref 3.5–5)
ALP SERPL-CCNC: 47 U/L (ref 34–104)
ALT SERPL W P-5'-P-CCNC: 19 U/L (ref 7–52)
ANION GAP SERPL CALCULATED.3IONS-SCNC: 10 MMOL/L (ref 4–13)
APTT PPP: 31 SECONDS (ref 23–34)
AST SERPL W P-5'-P-CCNC: 20 U/L (ref 13–39)
BASOPHILS # BLD AUTO: 0.08 THOUSANDS/ΜL (ref 0–0.1)
BASOPHILS NFR BLD AUTO: 1 % (ref 0–1)
BILIRUB SERPL-MCNC: 0.53 MG/DL (ref 0.2–1)
BNP SERPL-MCNC: 78 PG/ML (ref 0–100)
BUN SERPL-MCNC: 20 MG/DL (ref 5–25)
CALCIUM SERPL-MCNC: 9 MG/DL (ref 8.4–10.2)
CARDIAC TROPONIN I PNL SERPL HS: 9 NG/L (ref ?–50)
CARDIAC TROPONIN I PNL SERPL HS: 9 NG/L (ref ?–50)
CHLORIDE SERPL-SCNC: 102 MMOL/L (ref 96–108)
CO2 SERPL-SCNC: 24 MMOL/L (ref 21–32)
CREAT SERPL-MCNC: 1.23 MG/DL (ref 0.6–1.3)
EOSINOPHIL # BLD AUTO: 0.33 THOUSAND/ΜL (ref 0–0.61)
EOSINOPHIL NFR BLD AUTO: 3 % (ref 0–6)
ERYTHROCYTE [DISTWIDTH] IN BLOOD BY AUTOMATED COUNT: 15.9 % (ref 11.6–15.1)
GFR SERPL CREATININE-BSD FRML MDRD: 45 ML/MIN/1.73SQ M
GLUCOSE SERPL-MCNC: 127 MG/DL (ref 65–140)
HCT VFR BLD AUTO: 40.2 % (ref 34.8–46.1)
HGB BLD-MCNC: 12.5 G/DL (ref 11.5–15.4)
IMM GRANULOCYTES # BLD AUTO: 0.03 THOUSAND/UL (ref 0–0.2)
IMM GRANULOCYTES NFR BLD AUTO: 0 % (ref 0–2)
INR PPP: 1.1 (ref 0.85–1.19)
LACTATE SERPL-SCNC: 1.4 MMOL/L (ref 0.5–2)
LIPASE SERPL-CCNC: 19 U/L (ref 11–82)
LYMPHOCYTES # BLD AUTO: 2.1 THOUSANDS/ΜL (ref 0.6–4.47)
LYMPHOCYTES NFR BLD AUTO: 21 % (ref 14–44)
MAGNESIUM SERPL-MCNC: 2.1 MG/DL (ref 1.9–2.7)
MCH RBC QN AUTO: 26.3 PG (ref 26.8–34.3)
MCHC RBC AUTO-ENTMCNC: 31.1 G/DL (ref 31.4–37.4)
MCV RBC AUTO: 85 FL (ref 82–98)
MONOCYTES # BLD AUTO: 0.86 THOUSAND/ΜL (ref 0.17–1.22)
MONOCYTES NFR BLD AUTO: 8 % (ref 4–12)
NEUTROPHILS # BLD AUTO: 6.8 THOUSANDS/ΜL (ref 1.85–7.62)
NEUTS SEG NFR BLD AUTO: 67 % (ref 43–75)
NRBC BLD AUTO-RTO: 0 /100 WBCS
PLATELET # BLD AUTO: 327 THOUSANDS/UL (ref 149–390)
PMV BLD AUTO: 9.3 FL (ref 8.9–12.7)
POTASSIUM SERPL-SCNC: 4 MMOL/L (ref 3.5–5.3)
PROT SERPL-MCNC: 7.6 G/DL (ref 6.4–8.4)
PROTHROMBIN TIME: 14.6 SECONDS (ref 12.3–15)
RBC # BLD AUTO: 4.75 MILLION/UL (ref 3.81–5.12)
SODIUM SERPL-SCNC: 136 MMOL/L (ref 135–147)
WBC # BLD AUTO: 10.2 THOUSAND/UL (ref 4.31–10.16)

## 2024-11-30 PROCEDURE — 36415 COLL VENOUS BLD VENIPUNCTURE: CPT | Performed by: EMERGENCY MEDICINE

## 2024-11-30 PROCEDURE — 70498 CT ANGIOGRAPHY NECK: CPT

## 2024-11-30 PROCEDURE — 96360 HYDRATION IV INFUSION INIT: CPT

## 2024-11-30 PROCEDURE — 83605 ASSAY OF LACTIC ACID: CPT | Performed by: EMERGENCY MEDICINE

## 2024-11-30 PROCEDURE — 70496 CT ANGIOGRAPHY HEAD: CPT

## 2024-11-30 PROCEDURE — 85610 PROTHROMBIN TIME: CPT | Performed by: EMERGENCY MEDICINE

## 2024-11-30 PROCEDURE — 83880 ASSAY OF NATRIURETIC PEPTIDE: CPT | Performed by: EMERGENCY MEDICINE

## 2024-11-30 PROCEDURE — 99245 OFF/OP CONSLTJ NEW/EST HI 55: CPT | Performed by: PSYCHIATRY & NEUROLOGY

## 2024-11-30 PROCEDURE — 85025 COMPLETE CBC W/AUTO DIFF WBC: CPT | Performed by: EMERGENCY MEDICINE

## 2024-11-30 PROCEDURE — 84484 ASSAY OF TROPONIN QUANT: CPT | Performed by: EMERGENCY MEDICINE

## 2024-11-30 PROCEDURE — 96361 HYDRATE IV INFUSION ADD-ON: CPT

## 2024-11-30 PROCEDURE — 83735 ASSAY OF MAGNESIUM: CPT | Performed by: EMERGENCY MEDICINE

## 2024-11-30 PROCEDURE — 83690 ASSAY OF LIPASE: CPT | Performed by: EMERGENCY MEDICINE

## 2024-11-30 PROCEDURE — 80053 COMPREHEN METABOLIC PANEL: CPT | Performed by: EMERGENCY MEDICINE

## 2024-11-30 PROCEDURE — 99285 EMERGENCY DEPT VISIT HI MDM: CPT

## 2024-11-30 PROCEDURE — 99285 EMERGENCY DEPT VISIT HI MDM: CPT | Performed by: EMERGENCY MEDICINE

## 2024-11-30 PROCEDURE — 85730 THROMBOPLASTIN TIME PARTIAL: CPT | Performed by: EMERGENCY MEDICINE

## 2024-11-30 PROCEDURE — 93005 ELECTROCARDIOGRAM TRACING: CPT

## 2024-11-30 PROCEDURE — 71045 X-RAY EXAM CHEST 1 VIEW: CPT

## 2024-11-30 RX ADMIN — IOHEXOL 75 ML: 350 INJECTION, SOLUTION INTRAVENOUS at 12:42

## 2024-11-30 RX ADMIN — SODIUM CHLORIDE 1000 ML: 0.9 INJECTION, SOLUTION INTRAVENOUS at 12:02

## 2024-11-30 NOTE — CONSULTS
"  Assessment & Plan  Numbness and tingling in left arm  Suspect cardiac etiology given history. Patient with no other focal weakness or sensory loss or cerebellar deficit, no change in vision or speech either thus low suspicion for acute stroke.  - C/w home eliquis and plavix and statin  - Defer further cardiac work up to primary team  - Certainly if sudden onset focal weakness facial droop changes in speech dysmetria vision loss, acute dizziness, please let neurology on call know immediately    Discussed with ED Attending above as well as with patient.        History of Present Illness   Vesna Langston is a 64 y.o.  female with DM, hx ventricular tachycardia s/p ICD, hx STEMI last year per patient s/p stent, HF with reduced EF on Eliquis and Plavix recent discharge from hospitalization earlier this month due to cardiac reasons,  who presents with left arm numbness and tingling associated with exertion SOB and lightheadedness. Pt denies vertigo    Pt states she was cleaning out her attic yesterday around 10 AM at which time she started experiencing above symptoms states after she laid down and tooke 3 - \"baby ASA\" her symptoms improved but denies complete resolution.    Again today AM when she was doing activity around the house similar symptoms thus came to ED  Denies any other numbness in face or LE that is new, no speech disturbance or vision changes noted.  Pt denies chest pain however states when she had STEMI last year requiring stent had no pain symptoms either    No history of stroke or TIA.  Denies med non compliance      Review of Systems   numbness or tingling of hands, numbness or tingling of feet, no shortness of breath at rest  I have reviewed the patient's PMH, PSH, Social History, Family History, Meds, and Allergies    Objective :  Temp:  [97.9 °F (36.6 °C)] 97.9 °F (36.6 °C)  HR:  [69-70] 69  BP: ()/(53-65) 96/53  Resp:  [14-18] 14  SpO2:  [96 %-99 %] 96 %  O2 Device: None (Room air)    Physical " ExamNeurological Exam    Gen: NAD  HEENT: NCAT, EOMI  Resp: Symmetric chest rise bl  Skin: no rashes on visualized portion of this exam  Ext: no edema    AO X 4 no aphasia or dysarthria  CN 2-12 grossly intact  Motor: no drift in either extremity  Sensation: Intact to LT in all extremities however reduced left forearm and arm and left hand, bl shoulder sensation symmetric however    Cerebellar: No dysmetria b/l FNF or HS testing  Gait: deferred- baseline walks with cane, denies any acute changes to this          Lab Results: I have reviewed the following results:I have personally reviewed pertinent reports.    Recent Labs     11/30/24  1201   WBC 10.20*   HGB 12.5   HCT 40.2      SODIUM 136   K 4.0      CO2 24   BUN 20   CREATININE 1.23   GLUC 127   MG 2.1     Imaging Results Review: I personally reviewed the following image studies in PACS and associated radiology reports: CT CTA H/N with no acute pathology or LVO,. My interpretation of the radiology images/reports is: as noted prior.      VTE Prophylaxis: home eliquis      Administrative Statements   VIRTUAL CARE DOCUMENTATION:     1. This service was provided via Telemedicine using [employer]     2. Parties in the room with patient during teleconsult Patient only    3. Confidentiality My office door was closed     4. Participants No one else was in the room    5. Patient acknowledged consent and understanding of privacy and security of the  Telemedicine consult. I informed the patient that I have reviewed their record in Epic and presented the opportunity for them to ask any questions regarding the visit today.  The patient agreed to participate.    6. I have spent a total time of 60 minutes in caring for this patient on the day of the visit/encounter including Diagnostic results, Risks and benefits of tx options, Instructions for management, and Counseling / Coordination of care, not including the time spent for establishing the audio/video  connection.

## 2024-11-30 NOTE — DISCHARGE INSTRUCTIONS
Please return if symptoms worsen.    Please follow-up with your cardiologist as already scheduled for Monday.

## 2024-11-30 NOTE — ED PROVIDER NOTES
Time reflects when diagnosis was documented in both MDM as applicable and the Disposition within this note       Time User Action Codes Description Comment    11/30/2024  1:30 PM Ankit Benjamin Add [R20.0] Left arm numbness     11/30/2024  2:30 PM Ankit Benjamin Add [R06.02] SOB (shortness of breath)           ED Disposition       ED Disposition   Discharge    Condition   Stable    Date/Time   Sat Nov 30, 2024  2:29 PM    Comment   Vesna Langston discharge to home/self care.                   Assessment & Plan       Medical Decision Making  Amount and/or Complexity of Data Reviewed  Labs: ordered. Decision-making details documented in ED Course.  Radiology: ordered and independent interpretation performed. Decision-making details documented in ED Course.  ECG/medicine tests: ordered and independent interpretation performed. Decision-making details documented in ED Course.  Discussion of management or test interpretation with external provider(s): At risk for but not limited to peripheral neuropathy, CVA, TIA, STEMI, NSTEMI, atypical chest pain        ED Course as of 11/30/24 1430   Sat Nov 30, 2024   1214 Chest x-ray with no acute disease.   1238 hs TnI 0hr: 9  Baseline   1347 Patient seen by neurology, Dr. Lilly.  Offered admission for observation.  Patient would rather go home.   1429 EKG #2 and troponin #2 are unremarkable.  Again discussed with patient.  Offered observation.  Would rather go home.  Will follow-up with her cardiologist on Monday as already scheduled.       Medications   sodium chloride 0.9 % bolus 1,000 mL (1,000 mL Intravenous New Bag 11/30/24 1202)   iohexol (OMNIPAQUE) 350 MG/ML injection (MULTI-DOSE) 75 mL (75 mL Intravenous Given 11/30/24 1242)       ED Risk Strat Scores       Stroke Assessment       Row Name 11/30/24 1156             NIH Stroke Scale    Interval Baseline      Level of Consciousness (1a.) 0      LOC Questions (1b.) 0      LOC Commands (1c.) 0      Best Gaze (2.) 0       Visual (3.) 0      Facial Palsy (4.) 0      Motor Arm, Left (5a.) 0      Motor Arm, Right (5b.) 0      Motor Leg, Left (6a.) 0      Motor Leg, Right (6b.) 0      Limb Ataxia (7.) 0      Sensory (8.) 1      Best Language (9.) 0      Dysarthria (10.) 0      Extinction and Inattention (11.) (Formerly Neglect) 0      Total 1                                      SBIRT 22yo+      Flowsheet Row Most Recent Value   Initial Alcohol Screen: US AUDIT-C     1. How often do you have a drink containing alcohol? 0 Filed at: 11/30/2024 1153   2. How many drinks containing alcohol do you have on a typical day you are drinking?  0 Filed at: 11/30/2024 1157   3b. FEMALE Any Age, or MALE 65+: How often do you have 4 or more drinks on one occassion? 0 Filed at: 11/30/2024 1153   Audit-C Score 0 Filed at: 11/30/2024 1150   CHRIS: How many times in the past year have you...    Used an illegal drug or used a prescription medication for non-medical reasons? Never Filed at: 11/30/2024 1157                            History of Present Illness       Chief Complaint   Patient presents with    Shortness of Breath     Pt states she was moving a bunch of things and started with left arm numbness, sob, nausea. States she took baby aspirin and numbness not getting better. Denies cp       Past Medical History:   Diagnosis Date    Acute respiratory insufficiency 03/22/2024    Allergic     Chronic HFrEF (heart failure with reduced ejection fraction) ([facility])     Coronary artery disease     COVID-19 virus infection 11/28/2022    Diabetes mellitus (HCC)     Disease of thyroid gland 4/09/2024    Heart disease 3/24    Hyperlipidemia     Hypoglycemia     ICD (implantable cardioverter-defibrillator) in place     Ischemic cardiomyopathy     Lactic acidosis 03/22/2024    Myocardial infarction ([facility]) 3/22/2024    Otitis media 1966    Seasonal allergies     ST elevation myocardial infarction involving left anterior descending (LAD) coronary artery ([facility]) 03/22/2024     Tobacco abuse     Visual impairment 1967      Past Surgical History:   Procedure Laterality Date    ADENOIDECTOMY      APPENDECTOMY      APPENDECTOMY LAPAROSCOPIC N/A 2024    Procedure: APPENDECTOMY LAPAROSCOPIC;  Surgeon: Lauryn Ullrich, DO;  Location: [facility];  Service: General    BACK SURGERY      BREAST EXCISIONAL BIOPSY Right 2000    cyst removed- benign    CARDIAC CATHETERIZATION N/A 2024    Procedure: Cardiac pci;  Surgeon: Gabriel Myers MD;  [facility];  Service: Cardiology    CARDIAC CATHETERIZATION N/A 2024    Procedure: Cardiac Coronary Angiogram;  Surgeon: Gabriel Myers MD;  [facility];  Service: Cardiology    CARDIAC CATHETERIZATION N/A 2024    Procedure: Cardiac PCI AMI;  Surgeon: Gabriel Myers MD;  [facility];  Service: Cardiology    CARDIAC CATHETERIZATION N/A 2024    Procedure: Cardiac IVUS;  Surgeon: Gabriel Myers MD;  [facility];  Service: Cardiology    CARDIAC DEFIBRILLATOR PLACEMENT      CARDIAC ELECTROPHYSIOLOGY PROCEDURE N/A 2024    Procedure: Cardiac icd implant;  Surgeon: Jeffy Lama MD;  [facility];  Service: Cardiology     SECTION      COLONOSCOPY      ECTOPIC PREGNANCY SURGERY      INSERT / REPLACE / REMOVE PACEMAKER      MASS EXCISION Left 10/18/2024    Procedure: EXCISION BIOPSY TISSUE LESION/MASS UPPER EXTREMITY - Left index finger;  Surgeon: Leandro Campos MD;  Location: [facility];  Service: Orthopedics    SEPTOPLASTY      SPINE SURGERY  23    TONSILLECTOMY        Family History   Adopted: Yes   Problem Relation Age of Onset    Depression Mother     Heart disease Father     OCD Daughter       Social History     Tobacco Use    Smoking status: Former     Current packs/day: 0.00     Average packs/day: 1 pack/day for 24.2 years (24.2 ttl pk-yrs)     Types: Cigarettes     Start date: 2000     Quit date: 3/22/2024     Years since  quittin.6     Passive exposure: Never    Smokeless tobacco: Never    Tobacco comments:     Smoked 0.5-1 ppd; quit in 2024.    Vaping Use    Vaping status: Never Used   Substance Use Topics    Alcohol use: Never     Alcohol/week: 1.0 standard drink of alcohol     Types: 1 Shots of liquor per week     Comment: Every 2or 3months    Drug use: Never      E-Cigarette/Vaping    E-Cigarette Use Never User       E-Cigarette/Vaping Substances    Nicotine No     THC No     CBD No     Flavoring No     Other No     Unknown No       I have reviewed and agree with the history as documented.     Patient did a lot of moving yesterday.  States that around 10 AM she developed numbness and tingling in the left arm.  No lack of coordination.  No weakness.  Just feels funny.  No neck pain.  No headaches.  Did complain of feeling weak and lightheaded when the symptoms started.  Had nausea.  Got short of breath.  States the numbness is never gone away since yesterday.  Does have a history of atrial fibrillation.  Is on Plavix and Eliquis.      History provided by:  Patient   used: No    Shortness of Breath  Severity:  Mild  Onset quality:  Gradual  Duration:  1 day  Timing:  Constant  Progression:  Improving  Chronicity:  New  Context: not activity, not emotional upset, not pollens and not URI    Relieved by:  Nothing  Worsened by:  Nothing  Ineffective treatments:  None tried  Associated symptoms: no abdominal pain, no chest pain, no cough, no ear pain, no fever, no headaches, no neck pain, no rash, no sore throat, no sputum production, no syncope, no swollen glands, no vomiting and no wheezing        Review of Systems   Constitutional:  Negative for chills and fever.   HENT:  Negative for ear pain, hearing loss, sore throat, trouble swallowing and voice change.    Eyes:  Negative for pain and discharge.   Respiratory:  Positive for shortness of breath. Negative for cough, sputum production and wheezing.     Cardiovascular:  Negative for chest pain, palpitations and syncope.   Gastrointestinal:  Negative for abdominal pain, blood in stool, constipation, diarrhea, nausea and vomiting.   Genitourinary:  Negative for dysuria, flank pain, frequency and hematuria.   Musculoskeletal:  Negative for joint swelling, neck pain and neck stiffness.   Skin:  Negative for rash and wound.   Neurological:  Positive for numbness. Negative for dizziness, seizures, syncope, facial asymmetry and headaches.   Psychiatric/Behavioral:  Negative for hallucinations, self-injury and suicidal ideas.    All other systems reviewed and are negative.          Objective       ED Triage Vitals   Temperature Pulse Blood Pressure Respirations SpO2 Patient Position - Orthostatic VS   11/30/24 1252 11/30/24 1152 11/30/24 1152 11/30/24 1152 11/30/24 1152 11/30/24 1252   97.9 °F (36.6 °C) 70 122/65 15 99 % Sitting      Temp Source Heart Rate Source BP Location FiO2 (%) Pain Score    11/30/24 1252 11/30/24 1252 11/30/24 1252 -- 11/30/24 1152    Oral Monitor Left arm  6      Vitals      Date and Time Temp Pulse SpO2 Resp BP Pain Score FACES Pain Rating User   11/30/24 1415 -- 69 99 % 18 113/57 -- -- SB   11/30/24 1300 -- 69 96 % 14 96/53 -- -- SB   11/30/24 1252 97.9 °F (36.6 °C) 69 97 % 18 96/53 -- -- PK   11/30/24 1205 -- -- -- -- -- 6 -- SB   11/30/24 1152 -- 70 99 % 15 122/65 6 -- MD            Physical Exam  Vitals and nursing note reviewed.   Constitutional:       General: She is not in acute distress.     Appearance: She is well-developed.   HENT:      Head: Normocephalic and atraumatic.      Right Ear: External ear normal.      Left Ear: External ear normal.   Eyes:      General: No scleral icterus.        Right eye: No discharge.         Left eye: No discharge.      Extraocular Movements: Extraocular movements intact.      Conjunctiva/sclera: Conjunctivae normal.   Cardiovascular:      Rate and Rhythm: Normal rate and regular rhythm.      Heart  sounds: Normal heart sounds. No murmur heard.  Pulmonary:      Effort: Pulmonary effort is normal.      Breath sounds: Normal breath sounds. No wheezing or rales.   Abdominal:      General: Bowel sounds are normal. There is no distension.      Palpations: Abdomen is soft.      Tenderness: There is no abdominal tenderness. There is no guarding or rebound.   Musculoskeletal:         General: No deformity. Normal range of motion.      Cervical back: Normal range of motion and neck supple.   Skin:     General: Skin is warm and dry.      Findings: No rash.   Neurological:      General: No focal deficit present.      Mental Status: She is alert and oriented to person, place, and time.      Cranial Nerves: No cranial nerve deficit.   Psychiatric:         Mood and Affect: Mood normal.         Behavior: Behavior normal.         Thought Content: Thought content normal.         Judgment: Judgment normal.         Results Reviewed       Procedure Component Value Units Date/Time    HS Troponin I 2hr [[id number]]  (Normal) Collected: 11/30/24 1347    Lab Status: Final result Specimen: Blood from Arm, Right Updated: 11/30/24 1423     hs TnI 2hr 9 ng/L      Delta 2hr hsTnI 0 ng/L     HS Troponin 0hr (reflex protocol) [[id number]]  (Normal) Collected: 11/30/24 1201    Lab Status: Final result Specimen: Blood from Arm, Left Updated: 11/30/24 1233     hs TnI 0hr 9 ng/L     B-Type Natriuretic Peptide(BNP) [[id number]]  (Normal) Collected: 11/30/24 1201    Lab Status: Final result Specimen: Blood from Arm, Left Updated: 11/30/24 1233     BNP 78 pg/mL     HS Troponin I 4hr [[id number]]     Lab Status: No result Specimen: Blood     Lactic acid, plasma (w/reflex if result > 2.0) [[id number]]  (Normal) Collected: 11/30/24 1201    Lab Status: Final result Specimen: Blood from Arm, Left Updated: 11/30/24 1226     LACTIC ACID 1.4 mmol/L     Narrative:      Result may be elevated if tourniquet was used during collection.    Comprehensive metabolic  panel [[id number]] Collected: 11/30/24 1201    Lab Status: Final result Specimen: Blood from Arm, Left Updated: 11/30/24 1226     Sodium 136 mmol/L      Potassium 4.0 mmol/L      Chloride 102 mmol/L      CO2 24 mmol/L      ANION GAP 10 mmol/L      BUN 20 mg/dL      Creatinine 1.23 mg/dL      Glucose 127 mg/dL      Calcium 9.0 mg/dL      AST 20 U/L      ALT 19 U/L      Alkaline Phosphatase 47 U/L      Total Protein 7.6 g/dL      Albumin 4.6 g/dL      Total Bilirubin 0.53 mg/dL      eGFR 45 ml/min/1.73sq m     Narrative:      National Kid[employer] guidelines for Chronic Kidney Disease (CKD):     Stage 1 with normal or high GFR (GFR > 90 mL/min/1.73 square meters)    Stage 2 Mild CKD (GFR = 60-89 mL/min/1.73 square meters)    Stage 3A Moderate CKD (GFR = 45-59 mL/min/1.73 square meters)    Stage 3B Moderate CKD (GFR = 30-44 mL/min/1.73 square meters)    Stage 4 Severe CKD (GFR = 15-29 mL/min/1.73 square meters)    Stage 5 End Stage CKD (GFR <15 mL/min/1.73 square meters)  Note: GFR calculation is accurate only with a steady state creatinine    Magnesium [[id number]]  (Normal) Collected: 11/30/24 1201    Lab Status: Final result Specimen: Blood from Arm, Left Updated: 11/30/24 1226     Magnesium 2.1 mg/dL     Lipase [[id number]]  (Normal) Collected: 11/30/24 1201    Lab Status: Final result Specimen: Blood from Arm, Left Updated: 11/30/24 1226     Lipase 19 u/L     Protime-INR [[id number]]  (Normal) Collected: 11/30/24 1201    Lab Status: Final result Specimen: Blood from Arm, Left Updated: 11/30/24 1223     Protime 14.6 seconds      INR 1.10    Narrative:      INR Therapeutic Range    Indication                                             INR Range      Atrial Fibrillation                                               2.0-3.0  Hypercoagulable State                                    2.0.2.3  Left Ventricular Asist Device                            2.0-3.0  Mechanical Heart Valve                                   -    Aortic(with afib, MI, embolism, HF, LA enlargement,    and/or coagulopathy)                                     2.0-3.0 (2.5-3.5)     Mitral                                                             2.5-3.5  Prosthetic/Bioprosthetic Heart Valve               2.0-3.0  Venous thromboembolism (VTE: VT, PE        2.0-3.0    APTT [[id number]]  (Normal) Collected: 11/30/24 1201    Lab Status: Final result Specimen: Blood from Arm, Left Updated: 11/30/24 1223     PTT 31 seconds     CBC and differential [[id number]]  (Abnormal) Collected: 11/30/24 1201    Lab Status: Final result Specimen: Blood from Arm, Left Updated: 11/30/24 1208     WBC 10.20 Thousand/uL      RBC 4.75 Million/uL      Hemoglobin 12.5 g/dL      Hematocrit 40.2 %      MCV 85 fL      MCH 26.3 pg      MCHC 31.1 g/dL      RDW 15.9 %      MPV 9.3 fL      Platelets 327 Thousands/uL      nRBC 0 /100 WBCs      Segmented % 67 %      Immature Grans % 0 %      Lymphocytes % 21 %      Monocytes % 8 %      Eosinophils Relative 3 %      Basophils Relative 1 %      Absolute Neutrophils 6.80 Thousands/µL      Absolute Immature Grans 0.03 Thousand/uL      Absolute Lymphocytes 2.10 Thousands/µL      Absolute Monocytes 0.86 Thousand/µL      Eosinophils Absolute 0.33 Thousand/µL      Basophils Absolute 0.08 Thousands/µL             CTA head and neck with and without contrast   Final Interpretation by Pete Pineda MD (11/30 1306)      CT Brain:  No acute intracranial abnormality.      CT Angiography:   1. No acute process on CTA neck and brain.   2. Bilateral carotid bifurcation atherosclerosis with less than 50% right ICA stenosis and no stenosis on the left.   3. Bilateral cavernous carotid atherosclerosis with moderate right cavernous segment stenosis.                  Workstation performed: [id number]         XR chest 1 view portable   ED Interpretation by Ankit Benjamin MD (11/30 1220)   NAD          ECG 12 Lead Documentation Only    Date/Time:  2024 11:59 AM    Performed by: Ankit Benjamin MD  Authorized by: Ankit Benjamin MD    ECG reviewed by me, the ED Provider: yes    Patient location:  ED  Rate:     ECG rate:  70  Rhythm:     Rhythm: sinus rhythm    Ectopy:     Ectopy: none    QRS:     QRS axis:  Normal      ED Medication and Procedure Management   Prior to Admission Medications   Prescriptions Last Dose Informant Patient Reported? Taking?   HYDROcodone-acetaminophen (NORCO) 5-325 mg per tablet  Self No No   Sig: Take 1 tablet by mouth every 6 (six) hours as needed for pain for up to 10 doses Max Daily Amount: 4 tablets   albuterol (ProAir HFA) 90 mcg/act inhaler  Self No No   Sig: Inhale 2 puffs every 6 (six) hours as needed for wheezing   apixaban (ELIQUIS) 5 mg  Self No No   Sig: Take 1 tablet (5 mg total) by mouth 2 (two) times a day   atorvastatin (LIPITOR) 80 mg tablet  Self No No   Sig: TAKE 1 TABLET BY MOUTH EVERY EVENING   clopidogrel (PLAVIX) 75 mg tablet  Self No No   Sig: Take 1 tablet (75 mg total) by mouth daily   diphenhydrAMINE (BENADRYL) 25 mg capsule  Self Yes No   Sig: Take 25 mg by mouth every 6 (six) hours as needed for itching   dofetilide (TIKOSYN) 125 mcg capsule   No No   Sig: Take 1 capsule (125 mcg total) by mouth every 12 (twelve) hours   fluticasone (FLONASE) 50 mcg/act nasal spray   No No   Si spray into each nostril daily   furosemide (LASIX) 20 mg tablet   No No   Sig: Take 2 tablets (40 mg total) by mouth 2 (two) times a day for 5 days. THEN 2 tablets (40 mg total) in the AM & then 1 tablet- 20 mg in the PM.   gabapentin (Neurontin) 100 mg capsule  Self No No   Sig: Take 1 capsule (100 mg total) by mouth 2 (two) times a day   glimepiride (AMARYL) 1 mg tablet  Self No No   Sig: Take 1 tablet (1 mg total) by mouth daily with breakfast   isosorbide mononitrate (IMDUR) 30 mg 24 hr tablet  Self No No   Sig: Take 1 tablet (30 mg total) by mouth daily   levothyroxine (Synthroid) 75 mcg tablet  Self No No    Sig: Take 1 tablet (75 mcg total) by mouth daily   meclizine (ANTIVERT) 25 mg tablet  Self No No   Sig: Take 1 tablet (25 mg total) by mouth every 8 (eight) hours as needed for dizziness   metoprolol succinate (TOPROL-XL) 25 mg 24 hr tablet  Self No No   Sig: Take 2 tablets (50 mg total) by mouth daily at bedtime   pantoprazole (PROTONIX) 40 mg tablet   No No   Sig: Take 1 tablet (40 mg total) by mouth 2 (two) times a day before meals   sacubitril-valsartan (Entresto) 49-51 MG TABS  Self No No   Sig: Take 1 tablet by mouth 2 (two) times a day   Patient taking differently: Take 2 tablets by mouth daily at bedtime 50 ml total   spironolactone (ALDACTONE) 25 mg tablet  Self No No   Sig: Take 1 tablet (25 mg total) by mouth daily      Facility-Administered Medications: None     Patient's Medications   Discharge Prescriptions    No medications on file     No discharge procedures on file.  ED SEPSIS DOCUMENTATION   Time reflects when diagnosis was documented in both MDM as applicable and the Disposition within this note       Time User Action Codes Description Comment    11/30/2024  1:30 PM Ankit Benjamin [R20.0] Left arm numbness     11/30/2024  2:30 PM Ankit Benjamin Add [R06.02] SOB (shortness of breath)                  Ankit Benjamin MD  11/30/24 3900

## 2024-11-30 NOTE — TELEPHONE ENCOUNTER
"Reason for Disposition   [1] Numbness (i.e., loss of sensation) of the face, arm / hand, or leg / foot on one side of the body AND [2] sudden onset AND [3] brief (now gone)    Answer Assessment - Initial Assessment Questions  1. SYMPTOM: \"What is the main symptom you are concerned about?\" (e.g., weakness, numbness)      Left arm numbness   2. ONSET: \"When did this start?\" (minutes, hours, days; while sleeping)      Yesterday  3. LAST NORMAL: \"When was the last time you (the patient) were normal (no symptoms)?\"      N/A  4. PATTERN \"Does this come and go, or has it been constant since it started?\"  \"Is it present now?\"      Present now, constant   5. CARDIAC SYMPTOMS: \"Have you had any of the following symptoms: chest pain, difficulty breathing, palpitations?\"      No  6. NEUROLOGIC SYMPTOMS: \"Have you had any of the following symptoms: headache, dizziness, vision loss, double vision, changes in speech, unsteady on your feet?\"      No  7. OTHER SYMPTOMS: \"Do you have any other symptoms?\"      No    Protocols used: Neurologic Deficit-Adult-AH    "

## 2024-11-30 NOTE — ASSESSMENT & PLAN NOTE
Suspect cardiac etiology given history. Patient with no other focal weakness or sensory loss or cerebellar deficit, no change in vision or speech either thus low suspicion for acute stroke.  - C/w home eliquis and plavix and statin  - Defer further cardiac work up to primary team  - Certainly if sudden onset focal weakness facial droop changes in speech dysmetria vision loss, acute dizziness, please let neurology on call know immediately    Discussed with ED Attending above as well as with patient.

## 2024-12-02 ENCOUNTER — CLINICAL SUPPORT (OUTPATIENT)
Dept: CARDIOLOGY CLINIC | Facility: CLINIC | Age: 64
End: 2024-12-02
Payer: COMMERCIAL

## 2024-12-02 ENCOUNTER — OFFICE VISIT (OUTPATIENT)
Dept: CARDIOLOGY CLINIC | Facility: CLINIC | Age: 64
End: 2024-12-02
Payer: COMMERCIAL

## 2024-12-02 VITALS
BODY MASS INDEX: 33.47 KG/M2 | WEIGHT: 220.1 LBS | HEART RATE: 68 BPM | DIASTOLIC BLOOD PRESSURE: 58 MMHG | SYSTOLIC BLOOD PRESSURE: 98 MMHG | OXYGEN SATURATION: 98 %

## 2024-12-02 VITALS — DIASTOLIC BLOOD PRESSURE: 50 MMHG | HEART RATE: 70 BPM | SYSTOLIC BLOOD PRESSURE: 86 MMHG

## 2024-12-02 DIAGNOSIS — I25.10 CORONARY ARTERY DISEASE INVOLVING NATIVE CORONARY ARTERY OF NATIVE HEART WITHOUT ANGINA PECTORIS: ICD-10-CM

## 2024-12-02 DIAGNOSIS — I48.92 ATRIAL FLUTTER, PAROXYSMAL (HCC): Primary | ICD-10-CM

## 2024-12-02 DIAGNOSIS — I25.5 ISCHEMIC CARDIOMYOPATHY: ICD-10-CM

## 2024-12-02 DIAGNOSIS — I48.92 ATRIAL FLUTTER, PAROXYSMAL (HCC): ICD-10-CM

## 2024-12-02 DIAGNOSIS — I50.22 HEART FAILURE WITH MID-RANGE EJECTION FRACTION (HFMEF) (HCC): Primary | ICD-10-CM

## 2024-12-02 DIAGNOSIS — E11.9 NEW ONSET TYPE 2 DIABETES MELLITUS (HCC): ICD-10-CM

## 2024-12-02 LAB
ATRIAL RATE: 70 BPM
ATRIAL RATE: 70 BPM
P AXIS: 45 DEGREES
P AXIS: 51 DEGREES
PR INTERVAL: 152 MS
PR INTERVAL: 158 MS
QRS AXIS: 43 DEGREES
QRS AXIS: 64 DEGREES
QRSD INTERVAL: 92 MS
QRSD INTERVAL: 92 MS
QT INTERVAL: 482 MS
QT INTERVAL: 484 MS
QTC INTERVAL: 520 MS
QTC INTERVAL: 522 MS
T WAVE AXIS: 70 DEGREES
T WAVE AXIS: 82 DEGREES
VENTRICULAR RATE: 70 BPM
VENTRICULAR RATE: 70 BPM

## 2024-12-02 PROCEDURE — 99214 OFFICE O/P EST MOD 30 MIN: CPT | Performed by: STUDENT IN AN ORGANIZED HEALTH CARE EDUCATION/TRAINING PROGRAM

## 2024-12-02 PROCEDURE — 93010 ELECTROCARDIOGRAM REPORT: CPT | Performed by: INTERNAL MEDICINE

## 2024-12-02 PROCEDURE — RECHECK

## 2024-12-02 PROCEDURE — 93000 ELECTROCARDIOGRAM COMPLETE: CPT

## 2024-12-02 RX ORDER — POTASSIUM CHLORIDE 1500 MG/1
20 TABLET, EXTENDED RELEASE ORAL DAILY
Qty: 30 TABLET | Refills: 17 | Status: SHIPPED | OUTPATIENT
Start: 2024-12-02 | End: 2026-05-26

## 2024-12-02 RX ORDER — FUROSEMIDE 40 MG/1
40 TABLET ORAL 2 TIMES DAILY
Qty: 60 TABLET | Refills: 17 | Status: SHIPPED | OUTPATIENT
Start: 2024-12-02 | End: 2024-12-03 | Stop reason: ALTCHOICE

## 2024-12-02 NOTE — PROGRESS NOTES
"Advanced Heart Failure/Pulmonary Hypertension Outpatient Note - Vesna Langston 64 y.o. female MRN: [id number]    @ Encounter: [id number]    Assessment:  64 y.o. female PMH and acute problems listed later in this note (a partial list may also be included within 'assessment' section) p/w HF fu.  I first met Vesna Langston on 5/6/24.    Primarily ICM, HFimpEF, LVEF 30-35%>PCI+gdmt>40-45% 10/2024 TTE, nondilated LV  LHC 03/22/2024: s/p PCI to 100% stenosis of ostial/proximal LAD. No other residual dz elsewhere.  cMRI 03/26/2024: LVEF 20%. LVIDd 4.5 cm. \"Transmural scarring of the mid anterior, anteroseptal and inferoseptal walls, the apical anterior, septal and inferior walls and the apex.\"   Coronary artery disease  S/p MI in 03/2024; received PCI to 100% stenosis of ostial/proximal LAD. No residual dz elsewhere.  History of monomorphic ventricular tachycardia              Per EP notes, felt to be scar mediated.               S/p secondary prevention ICD.               was on amiodarone per EP.  transitioned to dofetilide 125mcg twice daily 11/2024 during elective tikosyn admit.  Atrial flutter, paroxysmal               Anticoagulation on Eliquis.   PE 5/2024. CTA: Single subsegmental right lower lobe pulmonary embolus as shown on abdominal CT.   Hyperlipidemia  Diabetes mellitus, type II  Chronic back pain  History of tobacco abuse  4/7/24 CT: IMPRESSION:  Suspected small hemorrhage from the anterior wall of the distal gastric body with focal wall thickening. Bleeding due to underlying lesion or PUD is suspected. Recommend endoscopic correlation.  Past heavy NSAID use, 2024 stopped  Lung and spleen granulomas.  Works in [employer], no heavy lifting she says      I have reviewed all pertinent patient data including but not limited to:        Lab Units 11/30/24  1201 11/27/24  0557 11/26/24  0518   CREATININE mg/dL 1.23 1.12 0.92     Results from last 7 days   Lab Units 11/30/24  1201 11/27/24  0557 11/26/24  0518   CREATININE " "mg/dL 1.23 1.12 0.92       Lab Results   Component Value Date    K 4.0 11/30/2024     Lab Results   Component Value Date    HGBA1C 7.2 (H) 09/27/2024     Lab Results   Component Value Date    FDJ1TLFPHTMV 5.566 (H) 09/27/2024     Lab Results   Component Value Date    LDLCALC 103 (H) 09/27/2024     Lab Results   Component Value Date    BNP 78 11/30/2024      No results found for: \"NTBNP\"       Home scale dry weight 218lbs  When decompensated she has been 227lbs    TODAY'S PLAN:     12/02/24  Warm, euvolemic by exam  Home weight up 217 > 220lbs today, feels mild leg swelling, bloating; has been much more SOB x 3 days which began suddenly - previously felt very well for first few days after recent discharge (had elective tiksoyn initiation admit). Has ongoing unremitting LUE numbness from inner biceps area to hand (not more proximally) - ongoing x days after moved boxes with [employer] in attic during weekend, up to 40lbs.  No new chest pain  No syncope  Worsening hypotension  Minimal salt intake, drinks about 50-55 oz water daily she says  She was treated for yeast infx x 3days per PCP, she says her symptoms entirely resolved, no new pain, no fevers.   She does note she is recovering from sinusitis.    Messaged EP team today regarding QTc trend  I cannot see their ECG from today in muse or epic media tab, priors borderline  Fu EP for tikosyn plan    Will need to back off gdmt at least for now 2/2 hypotension and her Sx, reductions as below    Repeat BMP in 1-2 weeks, ordered today    She self increased lasix 40AM and 20PM to 80 AM 40PM x 1 day > cw 80 bid for tomorrow with extra potassium and then reduce to 40 bid.  May require switch to another diuretic near term, she states UO lagging    Gdmt below  Limited by hypotension, unfortunately reducing regimen 12/2/24  Has ICD for VT  No MR    Denny imdur 30 qd    On AC+plavix; ideally no pause until 3/2025 for any non-urgent surgical reason  On high intens statin    Deranged " Thyroids and DM per PCP    She again mentions multiple possible upcoming surgeries in 2025 for which she will need pre op risk stratification at appropriate time    Follows with sleep medicine, planned on Tx for POP, has not started yet - advised begin planned Tx as soon as able    Strict RTC precautions given  Fu in 2 weeks  She will call in 4-5 days with symptom check in  Low threshold repeat echo  Doubt new PE but monitor closely    Follow up:  With me in 2 weeks, or sooner if symptoms evolve  In addition to follow up with their other medical providers    Key info from my prior notes:    Now seeing nutritionist, no longer doing extreme caloric restriction to 800 cals per day, as she was in recent past  Hydrating better    She has been consuming only 800 calories a day for 1 month or longer in an extreme attempt to lose weight > advised against this. Nutrition referral given today.    Trial imdur for any component of angina  Then 1 week later up entresto and fu BMP afterwards; this will also help with volume management  Cw lasix 20 qd standing for now, which is recent increase from prior    Warm, euvolemic  Has increased lasix 20 prn to qdaily standing for past 1 week for increased weight, mild sob, mild edema of feet - no major improvement.  Today is last minute urgent visit for ED visit 8/25 for CP in setting of home  transiently, ACS ruled out. Then 8/26 yesterday had episode of vomiting, faintness, no LOC, no chest pain, has felt better since then. No ICD shocks, no palpitations. Did not improve with meclizine. Normal-higher blood sugars at home recently/during events.  Very rare opioid use she says for her back pain issues, none since 1-2 weeks ago    Follows GI    Discussed therapeutic lifestyle changes, low-moderate intensity exercise, maintain acceptable hydration 64 oz water daily, salt restrict, Mediterranean vs DASH diet, weight loss  Avoid nsaids    Further review of return to work next  week  Safest plan would be no driving x 3 mo - resume 6/2024 if remains stable    Pharmacotherapies / Neurohormonal Blockade:  --Beta Blocker: metoprolol succinate 50 qd  --ARNi / ACEi / ARB: entresto 49/51 bid > pause 12/2/24 for hypotension (near term resumption planned)  --Aldosterone Antagonist: aldactone 25 qd > pause 12/2/24 for hypotension   --SGLT2 Inhibitor: jardiance 25 qd for HF and DM > subsequently Sglt2i stopped in early 2024 2/2 yeast infx>9/10/24 Re-attempt lower sglt2i dose now, jardiance 10 qd, long discussion risks vs benefits and this is pt wish> DC 11/2024 2/2 recurrent yeast infx (may consider repeat re-attempt future, pt indicates desire for this 12/2/24)    --Diuretic: plan above     Sudden Cardiac Death Risk Reduction:  --Medtronic dual chamber ICD in situ since 03/2024 (secondary prevention).   11/2024  MDT DUAL ICD (MVP ON) / ACTIVE SYSTEM IS MRI CONDITIONAL   CARELINK TRANSMISSION: BATTERY VOLTAGE ADEQUATE. (12.3 YRS) AP 7%  1%. ALL AVAILABLE LEAD PARAMETERS WITHIN NORMAL LIMITS. NO SIGNIFICANT HIGH RATE EPISODES. OPTI-VOL WITHIN NORMAL LIMITS. NORMAL DEVICE FUNCTION.   Electrical Resynchronization:  --Candidacy for BiV device: narrow QRS.      Advanced Therapies: Will continue to monitor.    Studies:  I have reviewed all pertinent patient data/labs/imaging where available, including but not limited to the below studies. Selected results may be displayed here but comprehensive listing is omitted for note clarity and can be found in the epic chart.    ECG.    Echo.    Stress.    Cath.    HPI:   64 y.o. female PMH and acute problems listed later in this note (a partial list may also be included within 'assessment' section) p/w HF fu.  I first met Vesna Langston on 5/6/24.  No new CP/SOB/dizziness/palpitations/syncope.  No new fatigue.  No new unintentional weight changes.  No new leg swelling, PND, pillow orthopnea.  No new fevers, chills, cough, nausea, vomiting, diarrhea,  dysuria.      Interval History:  As noted in 'plan' section above and prior epic chart notes.    No new CP/dizziness/palpitations/syncope.  +sob and fatigue  No new unintentional weight changes.  +swelling  No new fevers, chills, cough, nausea, vomiting, diarrhea, dysuria.    Past Medical History:   Diagnosis Date    Acute respiratory insufficiency 03/22/2024    Allergic     Chronic HFrEF (heart failure with reduced ejection fraction) ([facility])     Coronary artery disease     COVID-19 virus infection 11/28/2022    Diabetes mellitus ([facility])     Disease of thyroid gland 4/09/2024    Heart disease 3/24    Hyperlipidemia     Hypoglycemia     ICD (implantable cardioverter-defibrillator) in place     Ischemic cardiomyopathy     Lactic acidosis 03/22/2024    Myocardial infarction ([facility]) 3/22/2024    Otitis media 1966    Seasonal allergies     ST elevation myocardial infarction involving left anterior descending (LAD) coronary artery ([facility]) 03/22/2024    Tobacco abuse     Visual impairment 1967     Patient Active Problem List    Diagnosis Date Noted    Numbness and tingling in left arm 11/30/2024    Primary osteoarthritis of right knee 10/08/2024    History of torn meniscus of right knee 10/08/2024    Chronic pain of right knee 10/08/2024    Localized edema 10/02/2024    POP (obstructive sleep apnea) 09/23/2024    Class 1 obesity due to excess calories with serious comorbidity and body mass index (BMI) of 34.0 to 34.9 in adult 08/27/2024    Ganglion cyst of finger of left hand 08/15/2024    History of pulmonary embolism 05/07/2024    History of gastric ulcer 05/07/2024    Generalized anxiety disorder 05/02/2024    Anemia due to acute blood loss 04/19/2024    Acquired hypothyroidism 04/19/2024    Constipation 04/10/2024    Type 2 diabetes mellitus, without long-term current use of insulin ([facility]) 04/07/2024    Syncope 04/01/2024    S/P ICD (internal cardiac defibrillator) procedure 03/27/2024    Chronic low back pain 03/25/2024     HFrEF (heart failure with reduced ejection fraction) ([facility]) 03/25/2024    Ischemic cardiomyopathy 03/24/2024    Coronary artery disease involving native coronary artery of native heart 03/23/2024    S/P coronary artery stent placement 03/23/2024    Atrial flutter, paroxysmal ([facility]) 03/22/2024    Mixed hyperlipidemia 03/22/2024    History of tobacco abuse 03/22/2024    History of sustained ventricular tachycardia 03/22/2024    Back pain 07/31/2022    Sciatica of left side 07/31/2022       ROS:  10 point ROS negative except as specified in HPI/interval history    Allergies   Allergen Reactions    Fish-Derived Products - Food Allergy Anaphylaxis     Cannot have all seafood products    Shellfish-Derived Products - Food Allergy Anaphylaxis    Azithromycin Hives and Rash     Current Outpatient Medications   Medication Instructions    albuterol (ProAir HFA) 90 mcg/act inhaler 2 puffs, Inhalation, Every 6 hours PRN    apixaban (ELIQUIS) 5 mg, Oral, 2 times daily    atorvastatin (LIPITOR) 80 mg, Oral, Every evening    clopidogrel (PLAVIX) 75 mg, Oral, Daily    diphenhydrAMINE (BENADRYL) 25 mg, Every 6 hours PRN    dofetilide (TIKOSYN) 125 mcg, Oral, Every 12 hours scheduled    fluticasone (FLONASE) 50 mcg/act nasal spray 1 spray, Nasal, Daily    furosemide (LASIX) 40 mg, Oral, 2 times daily, Take 2 tablets (40 mg total) by mouth 2 (two) times a day for 5 days. THEN 2 tablets (40 mg total) in the AM & then 1 tablet- 20 mg in the PM.    gabapentin (NEURONTIN) 100 mg, Oral, 2 times daily    glimepiride (AMARYL) 1 mg, Oral, Daily with breakfast    HYDROcodone-acetaminophen (NORCO) 5-325 mg per tablet 1 tablet, Oral, Every 6 hours PRN    isosorbide mononitrate (IMDUR) 30 mg, Oral, Daily    levothyroxine (SYNTHROID) 75 mcg, Oral, Daily    meclizine (ANTIVERT) 25 mg, Oral, Every 8 hours PRN    metoprolol succinate (TOPROL-XL) 50 mg, Oral, Daily at bedtime    pantoprazole (PROTONIX) 40 mg, Oral, 2 times daily before meals    potassium  chloride (Klor-Con M20) 20 mEq tablet 20 mEq, Oral, Daily      Social History     Socioeconomic History    Marital status:      Spouse name: Not on file    Number of children: 3    Years of education: Not on file    Highest education level: Not on file   Occupational History    Not on file   Tobacco Use    Smoking status: Former     Current packs/day: 0.00     Average packs/day: 1 pack/day for 24.2 years (24.2 ttl pk-yrs)     Types: Cigarettes     Start date: 2000     Quit date: 3/22/2024     Years since quittin.6     Passive exposure: Never    Smokeless tobacco: Never    Tobacco comments:     Smoked 0.5-1 ppd; quit in 2024.    Vaping Use    Vaping status: Never Used   Substance and Sexual Activity    Alcohol use: Never     Alcohol/week: 1.0 standard drink of alcohol     Types: 1 Shots of liquor per week     Comment: Every 2or 3months    Drug use: Never    Sexual activity: Not on file   Other Topics Concern    Not on file   Social History Narrative    Not on file     Social Drivers of Health     Financial Resource Strain: Low Risk  (2023)    [facility]    Overall Financial Resource Strain (CARDIA)     Difficulty of Paying Living Expenses: Not hard at all   Food Insecurity: No Food Insecurity (2024)    Nursing - Inadequate Food Risk Classification     Worried About Running Out of Food in the Last Year: Never true     Ran Out of Food in the Last Year: Never true     Ran Out of Food in the Last Year: 1   Transportation Needs: No Transportation Needs (2024)    Nursing - Transportation Risk Classification     Lack of Transportation: Not on file     Lack of Transportation: 2   Physical Activity: Not on file   Stress: No Stress Concern Present (2023)    [facility]    Citizen of [place] [employer] - Occupational Stress Questionnaire     Feeling of  Stress : Not at all   Social Connections: Unknown (2024)    Received from [email or web address]    Social Skinny Mom     How often do you feel lonely or isolated from those around you? (Adult - for ages 18 years and over): Not on file   Intimate Partner Violence: Unknown (2024)    Nursing IPS     Feels Physically and Emotionally Safe: Not on file     Physically Hurt by Someone: Not on file     Humiliated or Emotionally Abused by Someone: Not on file     Physically Hurt by Someone: 2     Hurt or Threatened by Someone: 2   Housing Stability: Unknown (2024)    Nursing: Inadequate Housing Risk Classification     Has Housing: Not on file     Worried About Losing Housing: Not on file     Unable to Get Utilities: Not on file     Unable to Pay for Housing in the Last Year: 2     Has Housin   Recent Concern: Housing Stability - High Risk (2024)    Housing Stability Vital Sign     Unable to Pay for Housing in the Last Year: No     Number of Times Moved in the Last Year: 2     Homeless in the Last Year: No     Family History   Adopted: Yes   Problem Relation Age of Onset    Depression Mother     Heart disease Father     OCD Daughter        Physical Exam:  Vitals:    24 1543   BP: 98/58   BP Location: Left arm   Patient Position: Sitting   Cuff Size: Standard   Pulse: 68   SpO2: 98%   Weight: 99.8 kg (220 lb 1.6 oz)     Constitutional: NAD, non toxic  Ears/nose/mouth/throat: atraumatic  CV: RRR, nl S1S2, no murmurs/rubs/gallups, no JVD, no HJR  Resp: CTABL  GI: Soft, NTND  MSK: no swollen joints in exposed areas  Extr: No edema, warm LE  Pysche: Normal affect  Neuro: appropriate in conversation  Skin: dry and intact in exposed areas    Labs & Results:  Lab Results   Component Value Date    SODIUM 136 2024    K 4.0 2024     2024    CO2 24 2024    BUN 20 2024    CREATININE 1.23 2024    GLUC 127 2024    CALCIUM 9.0 2024     Lab Results   Component Value  Date    WBC 10.20 (H) 11/30/2024    HGB 12.5 11/30/2024    HCT 40.2 11/30/2024    MCV 85 11/30/2024     11/30/2024       Counseling / Coordination of Care  Greater than 50% of total time was spent with the patient and / or family counseling and / or coordination of care.  We discussed diagnoses, most recent studies, tests and any changes in treatment plan.    Thank you for the opportunity to participate in the care of this patient.    Vitor Bell MD  Attending Physician  Advanced Heart Failure and Transplant Cardiology  [facility]

## 2024-12-02 NOTE — PROGRESS NOTES
Pt presents into the office today for a 1 week EKG post Tikosyn start. Pt is also taking Eliquis 5 mg and Metoprolol succ 25 mg. Pt states SOB, lightheadedness, nausea, tingling/numbness down the left arm, swelling, slight rapid HR, fatigue.        BP: 86/50  HR: 70      EKG reviewed in office by MICHAEL Mcmillan. Pt will hold off on her BP meds and continue taking Lasix. Should pt's symptoms worsen, advised her to report to the ED and follow up with Dr. Bell.

## 2024-12-03 ENCOUNTER — RESULTS FOLLOW-UP (OUTPATIENT)
Dept: CARDIOLOGY CLINIC | Facility: CLINIC | Age: 64
End: 2024-12-03

## 2024-12-03 ENCOUNTER — OFFICE VISIT (OUTPATIENT)
Dept: FAMILY MEDICINE CLINIC | Facility: CLINIC | Age: 64
End: 2024-12-03
Payer: COMMERCIAL

## 2024-12-03 ENCOUNTER — TELEPHONE (OUTPATIENT)
Age: 64
End: 2024-12-03

## 2024-12-03 VITALS
HEART RATE: 88 BPM | HEIGHT: 68 IN | WEIGHT: 222.88 LBS | OXYGEN SATURATION: 98 % | BODY MASS INDEX: 33.78 KG/M2 | DIASTOLIC BLOOD PRESSURE: 59 MMHG | TEMPERATURE: 96.8 F | SYSTOLIC BLOOD PRESSURE: 126 MMHG

## 2024-12-03 DIAGNOSIS — I25.5 ISCHEMIC CARDIOMYOPATHY: Primary | Chronic | ICD-10-CM

## 2024-12-03 DIAGNOSIS — I50.20 HFREF (HEART FAILURE WITH REDUCED EJECTION FRACTION) (HCC): ICD-10-CM

## 2024-12-03 DIAGNOSIS — I48.92 ATRIAL FLUTTER, PAROXYSMAL (HCC): Primary | Chronic | ICD-10-CM

## 2024-12-03 PROCEDURE — 99496 TRANSJ CARE MGMT HIGH F2F 7D: CPT | Performed by: FAMILY MEDICINE

## 2024-12-03 RX ORDER — FUROSEMIDE 40 MG/1
40 TABLET ORAL 2 TIMES DAILY
Qty: 60 TABLET | Refills: 17 | Status: SHIPPED | OUTPATIENT
Start: 2024-12-03 | End: 2026-05-27

## 2024-12-03 NOTE — PROGRESS NOTES
Transition of Care Visit  Name: Vesna Langston      : 1960      MRN: [id number]  Encounter Provider: Melissa Eldridge DO  Encounter Date: 12/3/2024   Encounter department: [facility] PRIMARY CARE    Assessment & Plan         History of Present Illness   {?Quick Links Encounters * My Last Note * Last Note in Specialty * Snapshot * Since Last Visit * History :06150}  Transitional Care Management Review:   Vesna Langston is a 64 y.o. female here for TCM follow up.     During the TCM phone call patient stated:  TCM Call       Date and time call was made  2024 12:51 PM    Hospital care reviewed  Records reviewed    Patient was hospitialized at  [facility]    Date of Admission  24    Date of discharge  24    Diagnosis  History of sustained ventricular Tachycardia    Disposition  Home    Were the patients medications reviewed and updated  Yes    Current Symptoms  None          TCM Call       Post hospital issues  None    Should patient be enrolled in anticoag monitoring?  No    Scheduled for follow up?  Yes    Patients specialists  Cardiologist    Did you obtain your prescribed medications  Yes    Do you need help managing your prescriptions or medications  No    Is transportation to your appointment needed  No    I have advised the patient to call PCP with any new or worsening symptoms  Keya Lam    Living Arrangements  Family members    Support System  Family    The type of support provided  Emotional; Financial; Physical    Do you have social support  Yes, as much as I need    Are you recieving any outpatient services  No    Are you recieving home care services  No    Are you using any community resources  No    Current waiver services  No    Interperter language line needed  No    Counseling  Family    Counseling topics  patient and family education          HPI  Review of Systems  Objective {?Quick Links Trend Vitals * Enter New Vitals * Results Review *  "Timeline (Adult) * Labs * Imaging * Cardiology * Procedures * Lung Cancer Screening * Surgical eConsent :18201}  Ht 5' 8\" (1.727 m)   BMI 33.47 kg/m²     Physical Exam  { Medications have NOT been reviewed by provider in current encounter. Once medications have been reviewed, please refresh your progress note so that you can sign the visit }    {Administrative / Billing Section (Optional):27601}  "

## 2024-12-03 NOTE — ASSESSMENT & PLAN NOTE
Status post pacemaker placement, follows with [facility] cardiology and electrophysiology  Rate control: Metoprolol succinate 50mg daily  AC: Eliquis 5 mg twice daily  Now on dofetilide 125mcg BID   Continue to follow-up cardiology recommendations

## 2024-12-03 NOTE — TELEPHONE ENCOUNTER
Patient calling to speak to someone regarding her new medication adjustments. She spoke to her pharmacy when picking up this medication and was told by the pharmacist to make sure our office knows there are contraindications to taking spironolactone and entresto with potassium. Patient was recently advised to stop spironolactone and entresto to begin taking potassium. The pharmacist also told patient that they sent our office a message asking for more clarification on the Lasix instructions, as they state it is unclear and that patient was only prescribed a 15-day supply. Please review.

## 2024-12-03 NOTE — PROGRESS NOTES
Transition of Care Visit  Name: Vesna Langston      : 1960      MRN: [id number]  Encounter Provider: Melissa Eldridge DO  Encounter Date: 12/3/2024   Encounter department: [facility] PRIMARY [facility]    Assessment & Plan  Atrial flutter, paroxysmal (HCC)  Status post pacemaker placement, follows with [facility] cardiology and electrophysiology  Rate control: Metoprolol succinate 50mg daily  AC: Eliquis 5 mg twice daily  Now on dofetilide 125mcg BID   Continue to follow-up cardiology recommendations       HFrEF (heart failure with reduced ejection fraction) (HCC)  Wt Readings from Last 3 Encounters:   24 101 kg (222 lb 14.2 oz)   24 99.8 kg (220 lb 1.6 oz)   24 104 kg (228 lb 2.8 oz)     Recent echocardiogram showing improvement in ejection fraction, currently 40 to 45%  Following closely with cardiology at [facility]  Medications: metoprolol XL, atorvastatin, Imdur, Lasix  Continue to follow-up cardiology recommendations              We also discussed possibly switching from amaryl to alternative DM medication due to risk for hypoglycemia. Patient has tried jardiance in the past but had yeast infection. GLP-1 agonists also with great CVD benefit, would appreciate cardiology thoughts on Ozempic or Mounjaro for this patient. I feel it would control her DM well and add some CVD benefits as well.       Return for Next scheduled follow up.    History of Present Illness     Transitional Care Management Review:   Vesna Langston is a 64 y.o. female here for TCM follow up.     During the TCM phone call patient stated:  TCM Call       Date and time call was made  2024 12:51 PM    Hospital care reviewed  Records reviewed    Patient was hospitialized at  [facility]    Date of Admission  24    Date of discharge  24    Diagnosis  History of sustained ventricular Tachycardia    Disposition  Home    Were the patients medications reviewed and updated  Yes    Current  "Symptoms  None          TCM Call       Post hospital issues  None    Should patient be enrolled in anticoag monitoring?  No    Scheduled for follow up?  Yes    Patients specialists  Cardiologist    Did you obtain your prescribed medications  Yes    Do you need help managing your prescriptions or medications  No    Is transportation to your appointment needed  No    I have advised the patient to call PCP with any new or worsening symptoms  Keya Bradfordtiffany    Living Arrangements  Family members    Support System  Family    The type of support provided  Emotional; Financial; Physical    Do you have social support  Yes, as much as I need    Are you recieving any outpatient services  No    Are you recieving home care services  No    Are you using any community resources  No    Current waiver services  No    Interperter language line needed  No    Counseling  Family    Counseling topics  patient and family education          Hospital course: \"Vesna Langston is a 64 y.o. female who was admitted elective for antiarrhythmic medication initiation.  Patient was seen in the office by Dr. Lama and recommended to initiate therapy with dofetilide.   She has been on chronic anticoagulation therapy with Eliquis without missed dose. She presents to the hospital in sinus rhythm.  She was initiated on dofetilide at 250 mcg twice daily. Serial EKGs were performed 2 hours after each dose of antiarrhythmic medication.  There was some QT prolongation and therefore she was lowered to 125 mcg twice daily and for low rate was increased to 70. There were no significant occurrence of ventricular ectopy on telemetry.  Electrolytes and renal function were monitored throughout her stay.  She underwent a total of 4 doses, with her last EKG as follows (last EKG was reviewed personally by attending and QT was felt to be a little less than 500 ms, recommendation was to obtain remote transmissions every month for 4 months and have them sent to Dr. Lama " "directly to ensure no ventricular arrhythmias). At time of discharge, patient was ambulating the cardiac unit without complaints of  lightheadedness, presyncope, syncope, chest pain, shortness of breath, orthopnea, PND, palpitations, or bleeding problems. She received education regarding dofetilide therapy, avoiding missed doses, pharmacy moitoring for drug to drug interactions, and concerning signs and symptoms that would prompt urgent evaluation.  She was given anEKG appointment on Monday after starting dofetilide along with a follow up with Lance Ye PA-C in 4-6 weeks in the office. In terms of patient's medications, no changes were made besides the addition of dofetilide 125 mcg twice daily. She is stable for discharge at this time with all questions answered. She was discussed in detail with Dr. Lama who is in agreement with this discharge summary.\"    Currently patient reports: Sob slightly improved today with improvement with LE edema. Took 80mg lasix today and urinated twice, saw cardiology yesterday. Is to take 80mg BID for one day, then 40mg BID after that. She has a call with her cardiologist this week and visit in 2 weeks. She is to take an extra K today. She reports no other med cahnges from yesterdays visit with cardiology. BP is acceptable today     HPI  Review of Systems   Constitutional:  Negative for appetite change, chills and fever.   Respiratory:  Positive for shortness of breath.    Cardiovascular:  Negative for chest pain and leg swelling.     Objective   /59 (BP Location: Right arm, Patient Position: Sitting, Cuff Size: Standard)   Pulse 88   Temp (!) 96.8 °F (36 °C) (Tympanic)   Ht 5' 8\" (1.727 m)   Wt 101 kg (222 lb 14.2 oz)   SpO2 98%   BMI 33.89 kg/m²     Physical Exam  Vitals reviewed.   Constitutional:       General: She is not in acute distress.     Appearance: She is obese. She is not ill-appearing.   HENT:      Head: Normocephalic and atraumatic.      Right Ear: " External ear normal.      Left Ear: External ear normal.      Nose: Nose normal.   Eyes:      Extraocular Movements: Extraocular movements intact.      Conjunctiva/sclera: Conjunctivae normal.   Cardiovascular:      Rate and Rhythm: Normal rate and regular rhythm.      Heart sounds: No murmur heard.  Pulmonary:      Effort: Pulmonary effort is normal.      Breath sounds: Normal breath sounds.   Abdominal:      General: Abdomen is flat. Bowel sounds are normal.      Palpations: Abdomen is soft.   Musculoskeletal:      Cervical back: Neck supple.      Right lower leg: No edema.      Left lower leg: No edema.   Neurological:      General: No focal deficit present.      Mental Status: She is alert.   Psychiatric:         Mood and Affect: Mood normal.         Behavior: Behavior normal.       Medications have been reviewed by provider in current encounter

## 2024-12-03 NOTE — ASSESSMENT & PLAN NOTE
Wt Readings from Last 3 Encounters:   12/03/24 101 kg (222 lb 14.2 oz)   12/02/24 99.8 kg (220 lb 1.6 oz)   11/30/24 104 kg (228 lb 2.8 oz)     Recent echocardiogram showing improvement in ejection fraction, currently 40 to 45%  Following closely with cardiology at [facility]  Medications: metoprolol XL, atorvastatin, Imdur, Lasix  Continue to follow-up cardiology recommendations

## 2024-12-05 NOTE — UTILIZATION REVIEW
NOTIFICATION OF INPATIENT ADMISSION      AUTHORIZATION REQUEST   SERVICING FACILITY:   [facility]  Address: [address] [place] [address]  Tax ID: [id number]  NPI: [id number] ATTENDING PROVIDER:  Attending Name and NPI#: Jeffy Lama Md [[id number]]  Address: [address] [place] [address]  Phone: [phone]   ADMISSION INFORMATION:  Place of Service: Inpatient [facility]  Place of Service Code: 21  Inpatient Admission Date/Time: 11/25/24  9:54 AM  Discharge Date/Time: 11/27/2024 11:36 AM  Admitting Diagnosis Code/Description:  Atrial flutter, paroxysmal (HCC) [I48.92]     UTILIZATION REVIEW CONTACT:  Tracy Bonilla, Utilization   Network Utilization Review Department  Phone: [phone]  Fax: [phone]  Email: [email or web address]  Contact for approvals/pending authorizations, clinical reviews, and discharge.     PHYSICIAN ADVISORY SERVICES:  Medical Necessity Denial & Odnq-bv-Ohcq Review  Phone: [phone]  Fax: [phone]  Email: [email or web address]     DISCHARGE SUPPORT TEAM:  For Patients Discharge Needs & Updates  Phone: [phone] opt. 2 Fax: [phone]  Email: [email or web address]

## 2024-12-05 NOTE — UTILIZATION REVIEW
NOTIFICATION OF ADMISSION DISCHARGE   This is a Notification of Discharge from [facility]. Please be advised that this patient has been discharge from our facility. Below you will find the admission and discharge date and time including the patient’s disposition.   UTILIZATION REVIEW CONTACT:  Nilo Lawson  Utilization   Network Utilization Review Department  Phone: [phone] x carefully listen to the prompts. All voicemails are confidential.  Email: [email or web address]     ADMISSION INFORMATION  PRESENTATION DATE: 11/25/2024  9:54 AM  OBERVATION ADMISSION DATE: N/A  INPATIENT ADMISSION DATE: 11/25/24  9:54 AM   DISCHARGE DATE: 11/27/2024 11:36 AM   DISPOSITION:Home/Self Care    Network Utilization Review Department  ATTENTION: Please call with any questions or concerns to [phone] and carefully listen to the prompts so that you are directed to the right person. All voicemails are confidential.   For Discharge needs, contact Care Management DC Support Team at [phone] opt. 2  Send all requests for admission clinical reviews, approved or denied determinations and any other requests to dedicated fax number below belonging to the campus where the patient is receiving treatment. List of dedicated fax numbers for the Facilities:  FACILITY NAME UR FAX NUMBER   ADMISSION DENIALS (Administrative/Medical Necessity) [phone]   DISCHARGE SUPPORT TEAM ([facility]) [phone]   PARENT CHILD HEALTH (Maternity/NICU/Pediatrics) [phone]   [facility] [phone]   [facility] [phone]   [facility] [phone]   [facility] [phone]   [facility] [phone]   [facility] [phone]   [facility] [phone]   [facility] [phone]    [facility] [phone]   [facility] [phone]   [facility] [phone]   [facility] [phone]

## 2024-12-06 ENCOUNTER — TELEPHONE (OUTPATIENT)
Age: 64
End: 2024-12-06

## 2024-12-06 NOTE — TELEPHONE ENCOUNTER
Pt was last seen on 12/2. She was taken off her entresto and spironolactone due to low BP's. She was told to call today with a BP log. Please see below.  She states she has been feeling better and believes her BP is coming back to the normal range. Pt stated her weight today is 1lb less than yesterday and did not have to take her lasix today. She stated she does not feel she is retaining any fluid. She stated she does get sob with exertion that has been going on the past couple of weeks. Denies chest pain nor dizziness. Pt also stated the numbness in her L arm has subsided.    Advised pt will send a message to the provider to review and advise.     12/3: 2:30am 98/55          10:07am 126/59           4pm 90/56           6:30pm 110/64 (after a workout)    12/4: 240am 112/82           9am 118/64           2pm 140/96 (just got done with a doctors appt and was upset at this time)            5pm 110/76 (after a workout)    12/5: 6am 124/77           10am 110/79           4:30pm 95/75           7pm 110/68    12/6: 4:50am 112/74           9:50am 110/72    HR ranges from 70-80

## 2024-12-09 DIAGNOSIS — Z86.79 HISTORY OF SUSTAINED VENTRICULAR TACHYCARDIA: Chronic | ICD-10-CM

## 2024-12-09 NOTE — TELEPHONE ENCOUNTER
Patient requesting new pharmacy  Medication: Dofetilide 125mcg capsule    Dose/Frequency: 1 capsule twice daily    Quantity: 60 capsules    Pharmacy: [facility]    Office:   [] PCP/Provider -   [x] Speciality/Provider -     Does the patient have enough for 3 days?   [x] Yes   [] No - Send as HP to POD

## 2024-12-10 RX ORDER — DOFETILIDE 0.12 MG/1
125 CAPSULE ORAL EVERY 12 HOURS SCHEDULED
Qty: 180 CAPSULE | Refills: 3 | Status: SHIPPED | OUTPATIENT
Start: 2024-12-10

## 2024-12-10 NOTE — PROGRESS NOTES
"Advanced Heart Failure/Pulmonary Hypertension Outpatient Note - Vesna Langston 64 y.o. female MRN: [id number]    @ Encounter: [id number]    Assessment:  64 y.o. female PMH and acute problems listed later in this note (a partial list may also be included within 'assessment' section) p/w HF fu.  I first met Vesna Langston on 5/6/24.    Primarily ICM, HFimpEF, LVEF 30-35%>PCI+gdmt>40-45% 10/2024 TTE, nondilated LV  LHC 03/22/2024: s/p PCI to 100% stenosis of ostial/proximal LAD. No other residual dz elsewhere.  cMRI 03/26/2024: LVEF 20%. LVIDd 4.5 cm. \"Transmural scarring of the mid anterior, anteroseptal and inferoseptal walls, the apical anterior, septal and inferior walls and the apex.\"   Coronary artery disease  S/p MI in 03/2024; received PCI to 100% stenosis of ostial/proximal LAD. No residual dz elsewhere.  History of monomorphic ventricular tachycardia              Per EP notes, felt to be scar mediated.               S/p secondary prevention ICD.               was on amiodarone per EP.  transitioned to dofetilide 125mcg twice daily 11/2024 during elective tikosyn admit.  Atrial flutter, paroxysmal               Anticoagulation on Eliquis.   PE 5/2024. CTA: Single subsegmental right lower lobe pulmonary embolus as shown on abdominal CT.   Hyperlipidemia  Diabetes mellitus, type II  Chronic back pain, multiple past surgeries, follows surgical team in [facility]. Dr. Pena  History of tobacco abuse  4/7/24 CT: IMPRESSION:  Suspected small hemorrhage from the anterior wall of the distal gastric body with focal wall thickening. Bleeding due to underlying lesion or PUD is suspected. Recommend endoscopic correlation.  Past heavy NSAID use, 2024 stopped  Lung and spleen granulomas.  Works in [employer], no heavy lifting she says      I have reviewed all pertinent patient data including but not limited to:        Lab Units 11/30/24  1201 11/27/24  0557 11/26/24  0518   CREATININE mg/dL 1.23 1.12 0.92             Lab " "Results   Component Value Date    K 4.0 11/30/2024     Lab Results   Component Value Date    HGBA1C 7.2 (H) 09/27/2024     Lab Results   Component Value Date    EQT7IRQEBERM 5.566 (H) 09/27/2024     Lab Results   Component Value Date    LDLCALC 103 (H) 09/27/2024     Lab Results   Component Value Date    BNP 78 11/30/2024      No results found for: \"NTBNP\"       Home scale dry weight 218lbs  When decompensated she has been 227lbs    TODAY'S PLAN:     12/13/24  Warm, euvolemic  No new cardiac complaints, feels slightly better breathing in recent week  Wt stable 220lbs at home  She reports significant new back imaging findings requiring surgery with her [facility] team in 3/2025; she states surgery would be sooner if she could come off blood thinners and had no cardiac risk but she was told it can safely be deferred until 3/2025 with new activity limitations and back brace. She is allowed to cw some rehab per her surgical team).     After prior reduction of some gdmt for fatigue, dizziness, and hypotension; her SBP at home is now 120-130, occasionally 160s which worries her and caused CP on 1 occasion.    Resume arni as planned last visit, since BP and prior fatigue Sx have improved. We had also held mra which se will staf off for now.  Consider imdur or other gdmt increase pending BP trend in 2-3 weeks, which she will report to us via phone    Check BMP in few days after arni resumption  May need lasix or potassium reduction future, pending course    SL nitro given in case sporadic CP  Strict RTC precautions given    Gdmt below  Limited by hypotension in past, her regimen has been interrupted intermittently due to hypotension in 2024  Plan above  Has ICD for VT  No MR    Cw imdur 30 qd; low threshold increase pending BP trend    On AC+plavix; ideally no pause until 3/2025 for any non-urgent surgical reason > we reviewed implications for her upcoming 3/2025 back surgery and we will have 2/2025 visit before " surgery  On high intens statin    On tikosyn per EP    Deranged Thyroids and DM per PCP    Follows with sleep medicine, planned on Tx for POP, has not started yet per prior notes - advised begin planned Tx as soon as able on multiple occasions     Follow up:  With me in 2/2025 as planned, or sooner if symptoms evolve  In addition to follow up with their other medical providers    Key info from my prior notes:    Strict RTC precautions given  Fu in 2 weeks  She will call in 4-5 days with symptom check in  Low threshold repeat echo  Doubt new PE but monitor closely    Home weight up 217 > 220lbs today, feels mild leg swelling, bloating; has been much more SOB x 3 days which began suddenly - previously felt very well for first few days after recent discharge (had elective tiksoyn initiation admit). Has ongoing unremitting LUE numbness from inner biceps area to hand (not more proximally) - ongoing x days after moved boxes with grandkids in attic during weekend, up to 40lbs.  No new chest pain  No syncope  Worsening hypotension  Minimal salt intake, drinks about 50-55 oz water daily she says  She was treated for yeast infx x 3days per PCP, she says her symptoms entirely resolved, no new pain, no fevers.   She does note she is recovering from sinusitis.    Messaged EP team today regarding QTc trend  I cannot see their ECG from today in muse or epic media tab, priors borderline  Fu EP for tikosyn plan    Will need to back off gdmt at least for now 2/2 hypotension and her Sx, reductions as below    Now seeing nutritionist, no longer doing extreme caloric restriction to 800 cals per day, as she was in recent past  Hydrating better    She has been consuming only 800 calories a day for 1 month or longer in an extreme attempt to lose weight > advised against this. Nutrition referral given today.    Trial imdur for any component of angina  Then 1 week later up entresto and fu BMP afterwards; this will also help with volume  management  Cw lasix 20 qd standing for now, which is recent increase from prior    Warm, euvolemic  Has increased lasix 20 prn to qdaily standing for past 1 week for increased weight, mild sob, mild edema of feet - no major improvement.  Today is last minute urgent visit for ED visit 8/25 for CP in setting of home  transiently, ACS ruled out. Then 8/26 yesterday had episode of vomiting, faintness, no LOC, no chest pain, has felt better since then. No ICD shocks, no palpitations. Did not improve with meclizine. Normal-higher blood sugars at home recently/during events.  Very rare opioid use she says for her back pain issues, none since 1-2 weeks ago    Follows GI    Discussed therapeutic lifestyle changes, low-moderate intensity exercise, maintain acceptable hydration 64 oz water daily, salt restrict, Mediterranean vs DASH diet, weight loss  Avoid nsaids    Further review of return to work next week  Safest plan would be no driving x 3 mo - resume 6/2024 if remains stable    Pharmacotherapies / Neurohormonal Blockade:  --Beta Blocker: metoprolol succinate 50 qd  --ARNi / ACEi / ARB: entresto 49/51 bid > pause 12/2/24 for hypotension (near term resumption planned)>resume entresto 49/51 bid 12/13/24 (low threshold reduce dose)  --Aldosterone Antagonist: aldactone 25 qd > pause 12/2/24 for hypotension   --SGLT2 Inhibitor: jardiance 25 qd for HF and DM > subsequently Sglt2i stopped in early 2024 2/2 yeast infx>9/10/24 Re-attempt lower sglt2i dose now, jardiance 10 qd, long discussion risks vs benefits and this is pt wish> DC 11/2024 2/2 recurrent yeast infx (may consider repeat re-attempt future, pt indicates desire for this 12/2/24)    --Diuretic: lasix 40 bid, potassium 20 qd     Sudden Cardiac Death Risk Reduction:  --Medtronic dual chamber ICD in situ since 03/2024 (secondary prevention).   12/3/24  MDT DUAL ICD (MVP ON) / ACTIVE SYSTEM IS MRI CONDITIONAL   NON-BILLABLE CARELINK TRANSMISSION: PER EVON EDEN DUE  TO DYSPNEA: CHECKING FOR ARYTHMIAS: BATTERY VOLTAGE ADEQUATE (12.2 YRS). AP: 24.2%. : 0%. (MVP-ON). ALL AVAILABLE LEAD PARAMETERS WITHIN NORMAL LIMITS. NO SIGNIFICANT HIGH RATE EPISODES. (PT DID SEND SEVERAL MANUAL TX YESTERDAY AND TODAY, NO EVENTS DETECTED). OPTI-VOL WITHIN NORMAL LIMITS. NORMAL DEVICE FUNCTION.    Electrical Resynchronization:  --Candidacy for BiV device: narrow QRS.      Advanced Therapies: Will continue to monitor.    Studies:  I have reviewed all pertinent patient data/labs/imaging where available, including but not limited to the below studies. Selected results may be displayed here but comprehensive listing is omitted for note clarity and can be found in the epic chart.    ECG.    Echo.    Stress.    Cath.    HPI:   64 y.o. female PMH and acute problems listed later in this note (a partial list may also be included within 'assessment' section) p/w HF fu.  I first met Vesna Langston on 5/6/24.  No new CP/SOB/dizziness/palpitations/syncope.  No new fatigue.  No new unintentional weight changes.  No new leg swelling, PND, pillow orthopnea.  No new fevers, chills, cough, nausea, vomiting, diarrhea, dysuria.      Interval History:  As noted in 'plan' section above and prior epic chart notes.    No new SOB/dizziness/palpitations/syncope.  No new fatigue.  No new unintentional weight changes.  No new leg swelling, PND, pillow orthopnea.  No new fevers, chills, cough, nausea, vomiting, diarrhea, dysuria.    Past Medical History:   Diagnosis Date    Acute respiratory insufficiency 03/22/2024    Allergic     Chronic HFrEF (heart failure with reduced ejection fraction) (HCC)     Coronary artery disease     COVID-19 virus infection 11/28/2022    Diabetes mellitus (HCC)     Disease of thyroid gland 4/09/2024    Heart disease 3/24    Hyperlipidemia     Hypoglycemia     ICD (implantable cardioverter-defibrillator) in place     Ischemic cardiomyopathy     Lactic acidosis 03/22/2024    Myocardial infarction  ([facility]) 3/22/2024    Otitis media 1966    Seasonal allergies     ST elevation myocardial infarction involving left anterior descending (LAD) coronary artery ([facility]) 03/22/2024    Tobacco abuse     Visual impairment 1967     Patient Active Problem List    Diagnosis Date Noted    Numbness and tingling in left arm 11/30/2024    Primary osteoarthritis of right knee 10/08/2024    History of torn meniscus of right knee 10/08/2024    Chronic pain of right knee 10/08/2024    Localized edema 10/02/2024    POP (obstructive sleep apnea) 09/23/2024    Class 1 obesity due to excess calories with serious comorbidity and body mass index (BMI) of 34.0 to 34.9 in adult 08/27/2024    Ganglion cyst of finger of left hand 08/15/2024    History of pulmonary embolism 05/07/2024    History of gastric ulcer 05/07/2024    Generalized anxiety disorder 05/02/2024    Anemia due to acute blood loss 04/19/2024    Acquired hypothyroidism 04/19/2024    Constipation 04/10/2024    Type 2 diabetes mellitus, without long-term current use of insulin ([facility]) 04/07/2024    Syncope 04/01/2024    S/P ICD (internal cardiac defibrillator) procedure 03/27/2024    Chronic low back pain 03/25/2024    HFrEF (heart failure with reduced ejection fraction) ([facility]) 03/25/2024    Ischemic cardiomyopathy 03/24/2024    Coronary artery disease involving native coronary artery of native heart 03/23/2024    S/P coronary artery stent placement 03/23/2024    Atrial flutter, paroxysmal ([facility]) 03/22/2024    Mixed hyperlipidemia 03/22/2024    History of tobacco abuse 03/22/2024    History of sustained ventricular tachycardia 03/22/2024    Back pain 07/31/2022    Sciatica of left side 07/31/2022       ROS:  10 point ROS negative except as specified in HPI/interval history    Allergies   Allergen Reactions    Fish-Derived Products - Food Allergy Anaphylaxis     Cannot have all seafood products    Shellfish-Derived Products - Food Allergy Anaphylaxis    Azithromycin Hives and Rash      Current Outpatient Medications   Medication Instructions    albuterol (ProAir HFA) 90 mcg/act inhaler 2 puffs, Inhalation, Every 6 hours PRN    apixaban (ELIQUIS) 5 mg, Oral, 2 times daily    atorvastatin (LIPITOR) 80 mg, Oral, Every evening    clopidogrel (PLAVIX) 75 mg, Oral, Daily    diphenhydrAMINE (BENADRYL) 25 mg, Every 6 hours PRN    dofetilide (TIKOSYN) 125 mcg, Oral, Every 12 hours scheduled    fluticasone (FLONASE) 50 mcg/act nasal spray 1 spray, Nasal, Daily    furosemide (LASIX) 40 mg, Oral, 2 times daily    gabapentin (NEURONTIN) 100 mg, Oral, 2 times daily    glimepiride (AMARYL) 1 mg, Oral, Daily with breakfast    HYDROcodone-acetaminophen (NORCO) 5-325 mg per tablet 1 tablet, Oral, Every 6 hours PRN    isosorbide mononitrate (IMDUR) 30 mg, Oral, Daily    levothyroxine (SYNTHROID) 75 mcg, Oral, Daily    meclizine (ANTIVERT) 25 mg, Oral, Every 8 hours PRN    metoprolol succinate (TOPROL-XL) 50 mg, Oral, Daily at bedtime    pantoprazole (PROTONIX) 40 mg, Oral, 2 times daily before meals    potassium chloride (Klor-Con M20) 20 mEq tablet 20 mEq, Oral, Daily    sacubitril-valsartan (Entresto) 49-51 MG TABS 1 tablet, Oral, 2 times daily      Social History     Socioeconomic History    Marital status:      Spouse name: Not on file    Number of children: 3    Years of education: Not on file    Highest education level: Not on file   Occupational History    Not on file   Tobacco Use    Smoking status: Former     Current packs/day: 0.00     Average packs/day: 1 pack/day for 24.2 years (24.2 ttl pk-yrs)     Types: Cigarettes     Start date: 2000     Quit date: 3/22/2024     Years since quittin.7     Passive exposure: Never    Smokeless tobacco: Never    Tobacco comments:     Smoked 0.5-1 ppd; quit in 2024.    Vaping Use    Vaping status: Never Used   Substance and Sexual Activity    Alcohol use: Never     Alcohol/week: 1.0 standard drink of alcohol     Types: 1 Shots of liquor per week      Comment: Every 2or 3months    Drug use: Never    Sexual activity: Not on file   Other Topics Concern    Not on file   Social History Narrative    Not on file     Social Drivers of Health     Financial Resource Strain: Low Risk  (2023)    [facility]    Overall Financial Resource Strain (CARDIA)     Difficulty of Paying Living Expenses: Not hard at all   Food Insecurity: No Food Insecurity (2024)    Nursing - Inadequate Food Risk Classification     Worried About Running Out of Food in the Last Year: Never true     Ran Out of Food in the Last Year: Never true     Ran Out of Food in the Last Year: 1   Transportation Needs: No Transportation Needs (2024)    Nursing - Transportation Risk Classification     Lack of Transportation: Not on file     Lack of Transportation: 2   Physical Activity: Not on file   Stress: No Stress Concern Present (2023)    [facility]    [employer] [place] [employer] - Occupational Stress Questionnaire     Feeling of Stress : Not at all   Social Connections: Unknown (2024)    Received from [facility]    Social [employer]     How often do you feel lonely or isolated from those around you? (Adult - for ages 18 years and over): Not on file   Intimate Partner Violence: Unknown (2024)    Nursing IPS     Feels Physically and Emotionally Safe: Not on file     Physically Hurt by Someone: Not on file     Humiliated or Emotionally Abused by Someone: Not on file     Physically Hurt by Someone: 2     Hurt or Threatened by Someone: 2   Housing Stability: Unknown (2024)    Nursing: Inadequate Housing Risk Classification     Has Housing: Not on file     Worried About Losing Housing: Not on file     Unable to Get Utilities: Not on file     Unable to Pay for Housing in the Last Year: 2     Has Housin   Recent Concern: Housing Stability -  "High Risk (9/13/2024)    Housing Stability Vital Sign     Unable to Pay for Housing in the Last Year: No     Number of Times Moved in the Last Year: 2     Homeless in the Last Year: No     Family History   Adopted: Yes   Problem Relation Age of Onset    Depression Mother     Heart disease Father     OCD Daughter        Physical Exam:  Vitals:    12/13/24 1052   BP: 118/76   BP Location: Left arm   Patient Position: Sitting   Cuff Size: Standard   Pulse: 77   SpO2: 100%   Weight: 100 kg (220 lb 14.4 oz)   Height: 5' 8\" (1.727 m)     Constitutional: NAD, non toxic  Ears/nose/mouth/throat: atraumatic  CV: RRR, nl S1S2, no murmurs/rubs/gallups, no JVD, no HJR  Resp: CTABL  GI: Soft, NTND  MSK: no swollen joints in exposed areas  Extr: No edema, warm LE  Pysche: Normal affect  Neuro: appropriate in conversation  Skin: dry and intact in exposed areas    Labs & Results:  Lab Results   Component Value Date    SODIUM 136 11/30/2024    K 4.0 11/30/2024     11/30/2024    CO2 24 11/30/2024    BUN 20 11/30/2024    CREATININE 1.23 11/30/2024    GLUC 127 11/30/2024    CALCIUM 9.0 11/30/2024     Lab Results   Component Value Date    WBC 10.20 (H) 11/30/2024    HGB 12.5 11/30/2024    HCT 40.2 11/30/2024    MCV 85 11/30/2024     11/30/2024       Counseling / Coordination of Care  Greater than 50% of total time was spent with the patient and / or family counseling and / or coordination of care.  We discussed diagnoses, most recent studies, tests and any changes in treatment plan.    Thank you for the opportunity to participate in the care of this patient.    Vitor Bell MD  Attending Physician  Advanced Heart Failure and Transplant Cardiology  [facility]  "

## 2024-12-11 DIAGNOSIS — K25.9 MULTIPLE GASTRIC ULCERS: ICD-10-CM

## 2024-12-12 RX ORDER — PANTOPRAZOLE SODIUM 40 MG/1
40 TABLET, DELAYED RELEASE ORAL
Qty: 180 TABLET | Refills: 1 | Status: SHIPPED | OUTPATIENT
Start: 2024-12-12

## 2024-12-13 ENCOUNTER — TELEPHONE (OUTPATIENT)
Dept: CARDIOLOGY CLINIC | Facility: CLINIC | Age: 64
End: 2024-12-13

## 2024-12-13 ENCOUNTER — OFFICE VISIT (OUTPATIENT)
Dept: CARDIOLOGY CLINIC | Facility: CLINIC | Age: 64
End: 2024-12-13
Payer: COMMERCIAL

## 2024-12-13 VITALS
WEIGHT: 220.9 LBS | OXYGEN SATURATION: 100 % | DIASTOLIC BLOOD PRESSURE: 76 MMHG | HEIGHT: 68 IN | HEART RATE: 77 BPM | BODY MASS INDEX: 33.48 KG/M2 | SYSTOLIC BLOOD PRESSURE: 118 MMHG

## 2024-12-13 DIAGNOSIS — I25.5 ISCHEMIC CARDIOMYOPATHY: Primary | Chronic | ICD-10-CM

## 2024-12-13 DIAGNOSIS — I25.10 CORONARY ARTERY DISEASE INVOLVING NATIVE CORONARY ARTERY OF NATIVE HEART WITHOUT ANGINA PECTORIS: ICD-10-CM

## 2024-12-13 DIAGNOSIS — I48.92 ATRIAL FLUTTER, PAROXYSMAL (HCC): ICD-10-CM

## 2024-12-13 PROCEDURE — 99214 OFFICE O/P EST MOD 30 MIN: CPT | Performed by: STUDENT IN AN ORGANIZED HEALTH CARE EDUCATION/TRAINING PROGRAM

## 2024-12-13 RX ORDER — NITROGLYCERIN 0.4 MG/1
0.4 TABLET SUBLINGUAL
Status: DISCONTINUED | OUTPATIENT
Start: 2024-12-13 | End: 2024-12-17

## 2024-12-13 RX ORDER — SACUBITRIL AND VALSARTAN 49; 51 MG/1; MG/1
1 TABLET, FILM COATED ORAL 2 TIMES DAILY
Qty: 60 TABLET | Refills: 17 | Status: SHIPPED | OUTPATIENT
Start: 2024-12-13 | End: 2026-06-06

## 2024-12-13 NOTE — TELEPHONE ENCOUNTER
Please resend the patient's prescription for Nitroglycerin as the drug was refilled as an inpatient med request instead of it being escribed and sent over to the pharmacy.

## 2024-12-13 NOTE — LETTER
December 13, 2024     Patient: Vesna Langston  YOB: 1960  Date of Visit: 12/13/2024      To Whom it May Concern:    Vesna Langston is under my professional care. Vesna was seen in my office on 12/13/2024. Vesna has been advised the following:    On eliquis+plavix; ideally no pause in these critical medications until 3/2025 for any non-urgent/emergent surgical reason.    If you have any questions or concerns, please don't hesitate to call.         Sincerely,          Vitor Bell MD        CC: No Recipients

## 2024-12-16 ENCOUNTER — APPOINTMENT (OUTPATIENT)
Dept: LAB | Facility: HOSPITAL | Age: 64
End: 2024-12-16
Payer: COMMERCIAL

## 2024-12-16 DIAGNOSIS — I50.22 HEART FAILURE WITH MID-RANGE EJECTION FRACTION (HFMEF) (HCC): ICD-10-CM

## 2024-12-16 DIAGNOSIS — E11.9 TYPE 2 DIABETES MELLITUS WITHOUT COMPLICATION, WITHOUT LONG-TERM CURRENT USE OF INSULIN (HCC): ICD-10-CM

## 2024-12-16 LAB
ANION GAP SERPL CALCULATED.3IONS-SCNC: 6 MMOL/L (ref 4–13)
BUN SERPL-MCNC: 14 MG/DL (ref 5–25)
CALCIUM SERPL-MCNC: 9.2 MG/DL (ref 8.4–10.2)
CHLORIDE SERPL-SCNC: 102 MMOL/L (ref 96–108)
CO2 SERPL-SCNC: 31 MMOL/L (ref 21–32)
CREAT SERPL-MCNC: 0.92 MG/DL (ref 0.6–1.3)
GFR SERPL CREATININE-BSD FRML MDRD: 65 ML/MIN/1.73SQ M
GLUCOSE P FAST SERPL-MCNC: 105 MG/DL (ref 65–99)
POTASSIUM SERPL-SCNC: 3.6 MMOL/L (ref 3.5–5.3)
SODIUM SERPL-SCNC: 139 MMOL/L (ref 135–147)

## 2024-12-16 PROCEDURE — 80048 BASIC METABOLIC PNL TOTAL CA: CPT

## 2024-12-16 PROCEDURE — 85025 COMPLETE CBC W/AUTO DIFF WBC: CPT

## 2024-12-16 PROCEDURE — 83036 HEMOGLOBIN GLYCOSYLATED A1C: CPT

## 2024-12-16 PROCEDURE — 84443 ASSAY THYROID STIM HORMONE: CPT

## 2024-12-16 PROCEDURE — 80061 LIPID PANEL: CPT

## 2024-12-16 PROCEDURE — 36415 COLL VENOUS BLD VENIPUNCTURE: CPT

## 2024-12-17 ENCOUNTER — TELEPHONE (OUTPATIENT)
Dept: PHYSICAL THERAPY | Facility: OTHER | Age: 64
End: 2024-12-17

## 2024-12-17 ENCOUNTER — RESULTS FOLLOW-UP (OUTPATIENT)
Dept: CARDIOLOGY CLINIC | Facility: CLINIC | Age: 64
End: 2024-12-17

## 2024-12-17 DIAGNOSIS — I25.10 CORONARY ARTERY DISEASE INVOLVING NATIVE CORONARY ARTERY OF NATIVE HEART, UNSPECIFIED WHETHER ANGINA PRESENT: Primary | Chronic | ICD-10-CM

## 2024-12-17 RX ORDER — NITROGLYCERIN 0.4 MG/1
0.4 TABLET SUBLINGUAL
Qty: 30 TABLET | Refills: 0 | Status: SHIPPED | OUTPATIENT
Start: 2024-12-17 | End: 2025-01-16

## 2024-12-21 ENCOUNTER — APPOINTMENT (EMERGENCY)
Dept: RADIOLOGY | Facility: HOSPITAL | Age: 64
End: 2024-12-21
Payer: COMMERCIAL

## 2024-12-21 ENCOUNTER — PATIENT MESSAGE (OUTPATIENT)
Dept: CARDIOLOGY CLINIC | Facility: CLINIC | Age: 64
End: 2024-12-21

## 2024-12-21 ENCOUNTER — NURSE TRIAGE (OUTPATIENT)
Dept: OTHER | Facility: OTHER | Age: 64
End: 2024-12-21

## 2024-12-21 ENCOUNTER — HOSPITAL ENCOUNTER (EMERGENCY)
Facility: HOSPITAL | Age: 64
Discharge: HOME | End: 2024-12-22
Attending: EMERGENCY MEDICINE | Admitting: EMERGENCY MEDICINE
Payer: COMMERCIAL

## 2024-12-21 DIAGNOSIS — R07.9 CHEST PAIN: Primary | ICD-10-CM

## 2024-12-21 LAB
2HR DELTA HS TROPONIN: 0 NG/L
ANION GAP SERPL CALCULATED.3IONS-SCNC: 10 MMOL/L (ref 4–13)
BASOPHILS # BLD AUTO: 0.07 THOUSANDS/ÂΜL (ref 0–0.1)
BASOPHILS NFR BLD AUTO: 1 % (ref 0–1)
BUN SERPL-MCNC: 17 MG/DL (ref 5–25)
CALCIUM SERPL-MCNC: 9.3 MG/DL (ref 8.4–10.2)
CARDIAC TROPONIN I PNL SERPL HS: 9 NG/L (ref ?–50)
CARDIAC TROPONIN I PNL SERPL HS: 9 NG/L (ref ?–50)
CHLORIDE SERPL-SCNC: 101 MMOL/L (ref 96–108)
CO2 SERPL-SCNC: 28 MMOL/L (ref 21–32)
CREAT SERPL-MCNC: 1.12 MG/DL (ref 0.6–1.3)
EOSINOPHIL # BLD AUTO: 0.36 THOUSAND/ÂΜL (ref 0–0.61)
EOSINOPHIL NFR BLD AUTO: 4 % (ref 0–6)
ERYTHROCYTE [DISTWIDTH] IN BLOOD BY AUTOMATED COUNT: 15.5 % (ref 11.6–15.1)
GFR SERPL CREATININE-BSD FRML MDRD: 51 ML/MIN/1.73SQ M
GLUCOSE SERPL-MCNC: 97 MG/DL (ref 65–140)
HCT VFR BLD AUTO: 38 % (ref 34.8–46.1)
HGB BLD-MCNC: 11.9 G/DL (ref 11.5–15.4)
IMM GRANULOCYTES # BLD AUTO: 0.03 THOUSAND/UL (ref 0–0.2)
IMM GRANULOCYTES NFR BLD AUTO: 0 % (ref 0–2)
LYMPHOCYTES # BLD AUTO: 2.69 THOUSANDS/ÂΜL (ref 0.6–4.47)
LYMPHOCYTES NFR BLD AUTO: 28 % (ref 14–44)
MCH RBC QN AUTO: 26.6 PG (ref 26.8–34.3)
MCHC RBC AUTO-ENTMCNC: 31.3 G/DL (ref 31.4–37.4)
MCV RBC AUTO: 85 FL (ref 82–98)
MONOCYTES # BLD AUTO: 0.8 THOUSAND/ÂΜL (ref 0.17–1.22)
MONOCYTES NFR BLD AUTO: 8 % (ref 4–12)
NEUTROPHILS # BLD AUTO: 5.54 THOUSANDS/ÂΜL (ref 1.85–7.62)
NEUTS SEG NFR BLD AUTO: 59 % (ref 43–75)
NRBC BLD AUTO-RTO: 0 /100 WBCS
PLATELET # BLD AUTO: 302 THOUSANDS/UL (ref 149–390)
PMV BLD AUTO: 9.6 FL (ref 8.9–12.7)
POTASSIUM SERPL-SCNC: 3.5 MMOL/L (ref 3.5–5.3)
RBC # BLD AUTO: 4.47 MILLION/UL (ref 3.81–5.12)
SODIUM SERPL-SCNC: 139 MMOL/L (ref 135–147)
WBC # BLD AUTO: 9.49 THOUSAND/UL (ref 4.31–10.16)

## 2024-12-21 PROCEDURE — 99285 EMERGENCY DEPT VISIT HI MDM: CPT

## 2024-12-21 PROCEDURE — 71045 X-RAY EXAM CHEST 1 VIEW: CPT

## 2024-12-21 PROCEDURE — 36415 COLL VENOUS BLD VENIPUNCTURE: CPT | Performed by: EMERGENCY MEDICINE

## 2024-12-21 PROCEDURE — 93005 ELECTROCARDIOGRAM TRACING: CPT

## 2024-12-21 PROCEDURE — 99285 EMERGENCY DEPT VISIT HI MDM: CPT | Performed by: EMERGENCY MEDICINE

## 2024-12-21 PROCEDURE — 80048 BASIC METABOLIC PNL TOTAL CA: CPT | Performed by: EMERGENCY MEDICINE

## 2024-12-21 PROCEDURE — 85025 COMPLETE CBC W/AUTO DIFF WBC: CPT | Performed by: EMERGENCY MEDICINE

## 2024-12-21 PROCEDURE — 84484 ASSAY OF TROPONIN QUANT: CPT | Performed by: EMERGENCY MEDICINE

## 2024-12-21 RX ORDER — ASPIRIN 81 MG/1
324 TABLET, CHEWABLE ORAL ONCE
Status: DISCONTINUED | OUTPATIENT
Start: 2024-12-21 | End: 2024-12-21

## 2024-12-21 RX ORDER — SODIUM CHLORIDE 9 MG/ML
3 INJECTION INTRAVENOUS
Status: DISCONTINUED | OUTPATIENT
Start: 2024-12-21 | End: 2024-12-22 | Stop reason: HOSPADM

## 2024-12-21 NOTE — TELEPHONE ENCOUNTER
Per on-call provider, please monitor for worsening symptoms. Patient can take Nitroglycerine every 5 minutes for up to 3 doses; if pain does not subside, she should call EMS.

## 2024-12-21 NOTE — TELEPHONE ENCOUNTER
"162/116, pulse 87 - had to take a Nitro; pain was going down her left arm. Repeat BP 10 minutes later: 149/89;  1545: 138/89  Now feeling ok    Reason for Disposition   Pain also in shoulder(s) or arm(s) or jaw  (Exception: Pain is clearly made worse by movement.)    Answer Assessment - Initial Assessment Questions  1. LOCATION: \"Where does it hurt?\"        Upper left chest over breast    2. RADIATION: \"Does the pain go anywhere else?\" (e.g., into neck, jaw, arms, back)      Radiating down into left arm    3. ONSET: \"When did the chest pain begin?\" (Minutes, hours or days)       1715    4. PATTERN: \"Does the pain come and go, or has it been constant since it started?\"  \"Does it get worse with exertion?\"       Has subsided after Nitro  5. DURATION: \"How long does it last\" (e.g., seconds, minutes, hours)      Lasted a minute or two  6. SEVERITY: \"How bad is the pain?\"  (e.g., Scale 1-10; mild, moderate, or severe)      3-4/10    7. CARDIAC RISK FACTORS: \"Do you have any history of heart problems or risk factors for heart disease?\" (e.g., angina, prior heart attack; diabetes, high blood pressure, high cholesterol, smoker, or strong family history of heart disease)      Had a massive heart attack in March, advance heart failure    8. PULMONARY RISK FACTORS: \"Do you have any history of lung disease?\"  (e.g., blood clots in lung, asthma, emphysema, birth control pills)      Had a clot in May    9. CAUSE: \"What do you think is causing the chest pain?\"      HTN    10. OTHER SYMPTOMS: \"Do you have any other symptoms?\" (e.g., dizziness, nausea, vomiting, sweating, fever, difficulty breathing, cough)        A little short of breath    Protocols used: Chest Pain-Adult-AH    "

## 2024-12-22 VITALS
TEMPERATURE: 97.5 F | DIASTOLIC BLOOD PRESSURE: 60 MMHG | RESPIRATION RATE: 14 BRPM | WEIGHT: 235.45 LBS | OXYGEN SATURATION: 98 % | BODY MASS INDEX: 35.8 KG/M2 | SYSTOLIC BLOOD PRESSURE: 116 MMHG | HEART RATE: 70 BPM

## 2024-12-22 NOTE — TELEPHONE ENCOUNTER
Patient called back. She still has chest pressure and nausea, though pain is improved. Advised ED as per previous advice from on call and per protocol. Advised EMS for early eval and intervention if needed as well as fast transport to appropriate facility but she declined. Her neighbor will drive her to nearest ER she says is very close.     Notified Dr Wing via ESC. Dr Wing wanted to know if she had taken 3 nitro which I had misunderstood the patient and thought she had. Contacted the patient and she had only taken 1 nitro. Advised she could take up to three if she wanted to try before going to ER. However patient already pulling in to ER and decided she would like to be evaluated.     Relayed to Dr Wing as well.

## 2024-12-22 NOTE — ED NOTES
This RN called the lab and asked for an update on the 2hr troponin. Lab informed this RN that the 2hr will result in approximately 20 minutes.       Em Hart RN  12/21/24 [id number]

## 2024-12-22 NOTE — DISCHARGE INSTRUCTIONS
Return to the ER immediately for any worsening symptoms.  Please follow-up with your cardiologist for further evaluation and management.

## 2024-12-22 NOTE — ED PROVIDER NOTES
Time reflects when diagnosis was documented in both MDM as applicable and the Disposition within this note       Time User Action Codes Description Comment    12/21/2024 11:34 PM Ann Blair Add [R07.9] Chest pain           ED Disposition       ED Disposition   Discharge    Condition   Stable    Date/Time   Sat Dec 21, 2024 11:34 PM    Comment   Vesna Langston discharge to home/self care.                   Assessment & Plan       Medical Decision Making  Differential diagnosis includes but not limited to: MI, PE, dissection, acute coronary syndrome    Amount and/or Complexity of Data Reviewed  Labs: ordered.  Radiology: ordered.    Risk  OTC drugs.  Prescription drug management.        ED Course as of 12/21/24 2338   Sat Dec 21, 2024   2333 Patient stable during the ED course, asymptomatic. Normal EKG as well as two negative troponin She is stable for dc with outpatient cardiology follow up.       Medications   sodium chloride (PF) 0.9 % injection 3 mL (has no administration in time range)       ED Risk Strat Scores   HEART Risk Score      Flowsheet Row Most Recent Value   Heart Score Risk Calculator    History 0 Filed at: 12/21/2024 2332   ECG 0 Filed at: 12/21/2024 2332   Age 2 Filed at: 12/21/2024 2332   Risk Factors 1 Filed at: 12/21/2024 2332   Troponin 0 Filed at: 12/21/2024 2332   HEART Score 3 Filed at: 12/21/2024 2332          HEART Risk Score      Flowsheet Row Most Recent Value   Heart Score Risk Calculator    History 0 Filed at: 12/21/2024 2332   ECG 0 Filed at: 12/21/2024 2332   Age 2 Filed at: 12/21/2024 2332   Risk Factors 1 Filed at: 12/21/2024 2332   Troponin 0 Filed at: 12/21/2024 2332   HEART Score 3 Filed at: 12/21/2024 2332                            SBIRT 20yo+      Flowsheet Row Most Recent Value   Initial Alcohol Screen: US AUDIT-C     1. How often do you have a drink containing alcohol? 0 Filed at: 12/21/2024 1922   2. How many drinks containing alcohol do you have on a typical day you are  drinking?  0 Filed at: 12/21/2024 1922   3a. Male UNDER 65: How often do you have five or more drinks on one occasion? 0 Filed at: 12/21/2024 1922   3b. FEMALE Any Age, or MALE 65+: How often do you have 4 or more drinks on one occassion? 0 Filed at: 12/21/2024 1922   Audit-C Score 0 Filed at: 12/21/2024 1922   CHRIS: How many times in the past year have you...    Used an illegal drug or used a prescription medication for non-medical reasons? Never Filed at: 12/21/2024 1922                            History of Present Illness       Chief Complaint   Patient presents with    Chest Pain     Chest pain radiating down left arm, pain relieved with 1 nitro. Past MI in March        Past Medical History:   Diagnosis Date    Acute respiratory insufficiency 03/22/2024    Allergic     Chronic HFrEF (heart failure with reduced ejection fraction) (HCC)     Coronary artery disease     COVID-19 virus infection 11/28/2022    Diabetes mellitus (HCC)     Disease of thyroid gland 4/09/2024    Heart disease 3/24    Hyperlipidemia     Hypoglycemia     ICD (implantable cardioverter-defibrillator) in place     Ischemic cardiomyopathy     Lactic acidosis 03/22/2024    Myocardial infarction (HCC) 3/22/2024    Otitis media 1966    Seasonal allergies     ST elevation myocardial infarction involving left anterior descending (LAD) coronary artery (HCC) 03/22/2024    Tobacco abuse     Visual impairment 1967      Past Surgical History:   Procedure Laterality Date    ADENOIDECTOMY      APPENDECTOMY      APPENDECTOMY LAPAROSCOPIC N/A 05/08/2024    Procedure: APPENDECTOMY LAPAROSCOPIC;  Surgeon: Lauryn Ullrich, DO;  Location: [facility];  Service: General    BACK SURGERY  8/23    BREAST EXCISIONAL BIOPSY Right 09/2000    cyst removed- benign    CARDIAC CATHETERIZATION N/A 03/22/2024    Procedure: Cardiac pci;  Surgeon: Gabriel Myers MD;  [facility]: [facility];  Service: Cardiology    CARDIAC CATHETERIZATION N/A 03/22/2024    Procedure:  Cardiac Coronary Angiogram;  Surgeon: Gabriel Myers MD;  [facility];  Service: Cardiology    CARDIAC CATHETERIZATION N/A 2024    Procedure: Cardiac PCI AMI;  Surgeon: Gabriel Myers MD;  [facility];  Service: Cardiology    CARDIAC CATHETERIZATION N/A 2024    Procedure: Cardiac IVUS;  Surgeon: Gabriel Myers MD;  [facility];  Service: Cardiology    CARDIAC DEFIBRILLATOR PLACEMENT      CARDIAC ELECTROPHYSIOLOGY PROCEDURE N/A 2024    Procedure: Cardiac icd implant;  Surgeon: Jeffy Lama MD;  [facility];  Service: Cardiology     SECTION      COLONOSCOPY      ECTOPIC PREGNANCY SURGERY      INSERT / REPLACE / REMOVE PACEMAKER      MASS EXCISION Left 10/18/2024    Procedure: EXCISION BIOPSY TISSUE LESION/MASS UPPER EXTREMITY - Left index finger;  Surgeon: Leandro Cmapos MD;  Location:  [facility] OR;  Service: Orthopedics    SEPTOPLASTY      SPINE SURGERY  23    TONSILLECTOMY        Family History   Adopted: Yes   Problem Relation Age of Onset    Depression Mother     Heart disease Father     OCD Daughter       Social History     Tobacco Use    Smoking status: Former     Current packs/day: 0.00     Average packs/day: 1 pack/day for 24.2 years (24.2 ttl pk-yrs)     Types: Cigarettes     Start date: 2000     Quit date: 3/22/2024     Years since quittin.7     Passive exposure: Never    Smokeless tobacco: Never    Tobacco comments:     Smoked 0.5-1 ppd; quit in 2024.    Vaping Use    Vaping status: Never Used   Substance Use Topics    Alcohol use: Never     Alcohol/week: 1.0 standard drink of alcohol     Types: 1 Shots of liquor per week     Comment: Every 2or 3months    Drug use: Never      E-Cigarette/Vaping    E-Cigarette Use Never User       E-Cigarette/Vaping Substances    Nicotine No     THC No     CBD No     Flavoring No     Other No     Unknown No       I have reviewed and agree with the history as documented.      This is a six 4-year-old female presenting to the ED for evaluation of chest pain radiating down her left arm.  Patient states that she took nitroglycerin at home prior to coming to the ED and her symptoms improved.  Patient had an MI in March and recently her PCP gave her nitroglycerin since she has been having some chest pain intermittently for the last several weeks.        Review of Systems   Constitutional:  Negative for chills and fever.   HENT:  Negative for ear pain and sore throat.    Eyes:  Negative for pain and visual disturbance.   Respiratory: Negative.  Negative for cough.    Cardiovascular:  Positive for chest pain. Negative for palpitations.   Gastrointestinal:  Negative for abdominal pain and vomiting.   Genitourinary:  Negative for dysuria and hematuria.   Musculoskeletal:  Negative for arthralgias and back pain.   Skin:  Negative for color change and rash.   Neurological:  Negative for seizures and syncope.   All other systems reviewed and are negative.          Objective       ED Triage Vitals [12/21/24 1922]   Temperature Pulse Blood Pressure Respirations SpO2 Patient Position - Orthostatic VS   97.5 °F (36.4 °C) 70 150/69 16 97 % Sitting      Temp Source Heart Rate Source BP Location FiO2 (%) Pain Score    Temporal Monitor Left arm -- 1      Vitals      Date and Time Temp Pulse SpO2 Resp BP Pain Score FACES Pain Rating User   12/21/24 2256 -- -- -- -- -- No Pain -- AD   12/21/24 2130 -- 70 97 % 14 103/58 -- -- AD   12/21/24 2100 -- 69 96 % 14 101/55 -- -- AD   12/21/24 2045 -- 69 98 % 14 109/60 -- -- AD   12/21/24 1945 -- 70 99 % 16 100/54 -- -- AD   12/21/24 1930 -- 70 99 % 16 127/60 -- -- AD   12/21/24 1922 97.5 °F (36.4 °C) 70 97 % 16 150/69 1 -- AD            Physical Exam  Vitals and nursing note reviewed.   Constitutional:       General: She is not in acute distress.     Appearance: She is well-developed. She is not ill-appearing.   HENT:      Head: Normocephalic and atraumatic.    Eyes:      Extraocular Movements: Extraocular movements intact.      Conjunctiva/sclera: Conjunctivae normal.   Neck:      Thyroid: No thyromegaly.   Cardiovascular:      Rate and Rhythm: Normal rate and regular rhythm.      Heart sounds: Normal heart sounds. No murmur heard.  Pulmonary:      Effort: Pulmonary effort is normal. No respiratory distress.      Breath sounds: Normal breath sounds.   Chest:      Chest wall: No mass, deformity, tenderness, crepitus or edema. There is no dullness to percussion.   Abdominal:      General: Bowel sounds are normal.      Palpations: Abdomen is soft.      Tenderness: There is no abdominal tenderness.   Musculoskeletal:         General: No swelling. Normal range of motion.      Cervical back: Neck supple.      Right lower leg: No edema.      Left lower leg: No edema.   Skin:     General: Skin is warm and dry.      Capillary Refill: Capillary refill takes less than 2 seconds.   Neurological:      General: No focal deficit present.      Mental Status: She is alert and oriented to person, place, and time.   Psychiatric:         Mood and Affect: Mood normal. Mood is not anxious.         Behavior: Behavior normal. Behavior is not agitated.         Results Reviewed       Procedure Component Value Units Date/Time    HS Troponin I 2hr [[id number]]  (Normal) Collected: 12/21/24 2118    Lab Status: Final result Specimen: Blood from Arm, Left Updated: 12/21/24 2325     hs TnI 2hr 9 ng/L      Delta 2hr hsTnI 0 ng/L     HS Troponin 0hr (reflex protocol) [[id number]]  (Normal) Collected: 12/21/24 1948    Lab Status: Final result Specimen: Blood from Arm, Left Updated: 12/21/24 2018     hs TnI 0hr 9 ng/L     HS Troponin I 4hr [[id number]]     Lab Status: No result Specimen: Blood     Basic metabolic panel [[id number]] Collected: 12/21/24 1948    Lab Status: Final result Specimen: Blood from Arm, Left Updated: 12/21/24 2013     Sodium 139 mmol/L      Potassium 3.5 mmol/L      Chloride 101 mmol/L       CO2 28 mmol/L      ANION GAP 10 mmol/L      BUN 17 mg/dL      Creatinine 1.12 mg/dL      Glucose 97 mg/dL      Calcium 9.3 mg/dL      eGFR 51 ml/min/1.73sq m     Narrative:      [employer] guidelines for Chronic Kidney Disease (CKD):     Stage 1 with normal or high GFR (GFR > 90 mL/min/1.73 square meters)    Stage 2 Mild CKD (GFR = 60-89 mL/min/1.73 square meters)    Stage 3A Moderate CKD (GFR = 45-59 mL/min/1.73 square meters)    Stage 3B Moderate CKD (GFR = 30-44 mL/min/1.73 square meters)    Stage 4 Severe CKD (GFR = 15-29 mL/min/1.73 square meters)    Stage 5 End Stage CKD (GFR <15 mL/min/1.73 square meters)  Note: GFR calculation is accurate only with a steady state creatinine    CBC and differential [[id number]]  (Abnormal) Collected: 12/21/24 1948    Lab Status: Final result Specimen: Blood from Arm, Left Updated: 12/21/24 2009     WBC 9.49 Thousand/uL      RBC 4.47 Million/uL      Hemoglobin 11.9 g/dL      Hematocrit 38.0 %      MCV 85 fL      MCH 26.6 pg      MCHC 31.3 g/dL      RDW 15.5 %      MPV 9.6 fL      Platelets 302 Thousands/uL      nRBC 0 /100 WBCs      Segmented % 59 %      Immature Grans % 0 %      Lymphocytes % 28 %      Monocytes % 8 %      Eosinophils Relative 4 %      Basophils Relative 1 %      Absolute Neutrophils 5.54 Thousands/µL      Absolute Immature Grans 0.03 Thousand/uL      Absolute Lymphocytes 2.69 Thousands/µL      Absolute Monocytes 0.80 Thousand/µL      Eosinophils Absolute 0.36 Thousand/µL      Basophils Absolute 0.07 Thousands/µL             X-ray chest 1 view portable    (Results Pending)       ECG 12 Lead Documentation Only    Date/Time: 12/21/2024 7:25 PM    Performed by: Ann Blair DO  Authorized by: Ann Blair DO    Indications / Diagnosis:  Chest pain  ECG reviewed by me, the ED Provider: yes    Patient location:  ED  Previous ECG:     Previous ECG:  Compared to current    Similarity:  No change    Comparison to  cardiac monitor: Yes    Interpretation:     Interpretation: normal    Rate:     ECG rate:  70    ECG rate assessment: normal    Rhythm:     Rhythm: sinus rhythm    QRS:     QRS axis:  Normal    QRS intervals:  Normal  Conduction:     Conduction: normal    ST segments:     ST segments:  Normal  T waves:     T waves: normal    Comments:      Low voltage        ED Medication and Procedure Management   Prior to Admission Medications   Prescriptions Last Dose Informant Patient Reported? Taking?   HYDROcodone-acetaminophen (NORCO) 5-325 mg per tablet  Self No No   Sig: Take 1 tablet by mouth every 6 (six) hours as needed for pain for up to 10 doses Max Daily Amount: 4 tablets   albuterol (ProAir HFA) 90 mcg/act inhaler  Self No No   Sig: Inhale 2 puffs every 6 (six) hours as needed for wheezing   apixaban (ELIQUIS) 5 mg  Self No No   Sig: Take 1 tablet (5 mg total) by mouth 2 (two) times a day   atorvastatin (LIPITOR) 80 mg tablet  Self No No   Sig: TAKE 1 TABLET BY MOUTH EVERY EVENING   clopidogrel (PLAVIX) 75 mg tablet  Self No No   Sig: Take 1 tablet (75 mg total) by mouth daily   diphenhydrAMINE (BENADRYL) 25 mg capsule  Self Yes No   Sig: Take 25 mg by mouth every 6 (six) hours as needed for itching   dofetilide (TIKOSYN) 125 mcg capsule  Self No No   Sig: Take 1 capsule (125 mcg total) by mouth every 12 (twelve) hours   fluticasone (FLONASE) 50 mcg/act nasal spray  Self No No   Si spray into each nostril daily   furosemide (LASIX) 40 mg tablet  Self No No   Sig: Take 1 tablet (40 mg total) by mouth 2 (two) times a day   gabapentin (Neurontin) 100 mg capsule  Self No No   Sig: Take 1 capsule (100 mg total) by mouth 2 (two) times a day   glimepiride (AMARYL) 1 mg tablet  Self No No   Sig: Take 1 tablet (1 mg total) by mouth daily with breakfast   isosorbide mononitrate (IMDUR) 30 mg 24 hr tablet  Self No No   Sig: Take 1 tablet (30 mg total) by mouth daily   levothyroxine (Synthroid) 75 mcg tablet  Self No No    Sig: Take 1 tablet (75 mcg total) by mouth daily   meclizine (ANTIVERT) 25 mg tablet  Self No No   Sig: Take 1 tablet (25 mg total) by mouth every 8 (eight) hours as needed for dizziness   metoprolol succinate (TOPROL-XL) 25 mg 24 hr tablet  Self No No   Sig: Take 2 tablets (50 mg total) by mouth daily at bedtime   nitroglycerin (NITROSTAT) 0.4 mg SL tablet   No No   Sig: Place 1 tablet (0.4 mg total) under the tongue every 5 (five) minutes as needed for chest pain   pantoprazole (PROTONIX) 40 mg tablet  Self No No   Sig: TAKE 1 TABLET BY MOUTH 2 TIMES A DAY BEFORE MEALS.   potassium chloride (Klor-Con M20) 20 mEq tablet  Self No No   Sig: Take 1 tablet (20 mEq total) by mouth daily   sacubitril-valsartan (Entresto) 49-51 MG TABS   No No   Sig: Take 1 tablet by mouth 2 (two) times a day      Facility-Administered Medications: None     Patient's Medications   Discharge Prescriptions    No medications on file     No discharge procedures on file.  ED SEPSIS DOCUMENTATION   Time reflects when diagnosis was documented in both MDM as applicable and the Disposition within this note       Time User Action Codes Description Comment    12/21/2024 11:34 PM Ann Blair [R07.9] Chest pain                  Ann Blair DO  12/21/24 5842

## 2024-12-23 ENCOUNTER — TELEPHONE (OUTPATIENT)
Age: 64
End: 2024-12-23

## 2024-12-23 LAB
ATRIAL RATE: 70 BPM
ATRIAL RATE: 72 BPM
P AXIS: 43 DEGREES
P AXIS: 59 DEGREES
PR INTERVAL: 176 MS
PR INTERVAL: 184 MS
QRS AXIS: 38 DEGREES
QRS AXIS: 55 DEGREES
QRSD INTERVAL: 88 MS
QRSD INTERVAL: 92 MS
QT INTERVAL: 478 MS
QT INTERVAL: 482 MS
QTC INTERVAL: 516 MS
QTC INTERVAL: 527 MS
T WAVE AXIS: 75 DEGREES
T WAVE AXIS: 75 DEGREES
VENTRICULAR RATE: 70 BPM
VENTRICULAR RATE: 72 BPM

## 2024-12-23 NOTE — TELEPHONE ENCOUNTER
Patient calling to follow up on her messages she sent earlier today. She has been having elevated BP and thinks it may have contributed to her chest pain. She took a nitro tablet which eased her chest pain and lowered her BP slightly, as mentioned in her [employer] message from this morning. She recently discontinued spironolactone, and wants to know Dr. Bell's advice on whether she should restart this medication or not. Please review and advise.

## 2024-12-24 ENCOUNTER — OFFICE VISIT (OUTPATIENT)
Dept: FAMILY MEDICINE CLINIC | Facility: CLINIC | Age: 64
End: 2024-12-24
Payer: COMMERCIAL

## 2024-12-24 ENCOUNTER — REMOTE DEVICE CLINIC VISIT (OUTPATIENT)
Dept: CARDIOLOGY CLINIC | Facility: CLINIC | Age: 64
End: 2024-12-24
Payer: COMMERCIAL

## 2024-12-24 ENCOUNTER — TELEPHONE (OUTPATIENT)
Dept: FAMILY MEDICINE CLINIC | Facility: CLINIC | Age: 64
End: 2024-12-24

## 2024-12-24 VITALS
BODY MASS INDEX: 33.91 KG/M2 | SYSTOLIC BLOOD PRESSURE: 92 MMHG | HEIGHT: 68 IN | WEIGHT: 223.77 LBS | TEMPERATURE: 97.7 F | DIASTOLIC BLOOD PRESSURE: 50 MMHG | HEART RATE: 70 BPM | OXYGEN SATURATION: 99 %

## 2024-12-24 DIAGNOSIS — I21.3 STEMI (ST ELEVATION MYOCARDIAL INFARCTION) (HCC): ICD-10-CM

## 2024-12-24 DIAGNOSIS — I21.02 ST ELEVATION MYOCARDIAL INFARCTION INVOLVING LEFT ANTERIOR DESCENDING (LAD) CORONARY ARTERY (HCC): ICD-10-CM

## 2024-12-24 DIAGNOSIS — E78.2 MIXED HYPERLIPIDEMIA: ICD-10-CM

## 2024-12-24 DIAGNOSIS — E66.811 CLASS 1 OBESITY DUE TO EXCESS CALORIES WITH SERIOUS COMORBIDITY AND BODY MASS INDEX (BMI) OF 34.0 TO 34.9 IN ADULT: ICD-10-CM

## 2024-12-24 DIAGNOSIS — G47.33 OSA (OBSTRUCTIVE SLEEP APNEA): ICD-10-CM

## 2024-12-24 DIAGNOSIS — I25.10 CORONARY ARTERY DISEASE INVOLVING NATIVE CORONARY ARTERY OF NATIVE HEART WITHOUT ANGINA PECTORIS: Chronic | ICD-10-CM

## 2024-12-24 DIAGNOSIS — E66.09 CLASS 1 OBESITY DUE TO EXCESS CALORIES WITH SERIOUS COMORBIDITY AND BODY MASS INDEX (BMI) OF 34.0 TO 34.9 IN ADULT: ICD-10-CM

## 2024-12-24 DIAGNOSIS — E11.9 TYPE 2 DIABETES MELLITUS WITHOUT COMPLICATION, WITHOUT LONG-TERM CURRENT USE OF INSULIN (HCC): Primary | ICD-10-CM

## 2024-12-24 DIAGNOSIS — Z95.810 AICD (AUTOMATIC CARDIOVERTER/DEFIBRILLATOR) PRESENT: Primary | ICD-10-CM

## 2024-12-24 PROBLEM — D62 ANEMIA DUE TO ACUTE BLOOD LOSS: Status: RESOLVED | Noted: 2024-04-19 | Resolved: 2024-12-24

## 2024-12-24 PROBLEM — K59.00 CONSTIPATION: Status: RESOLVED | Noted: 2024-04-10 | Resolved: 2024-12-24

## 2024-12-24 PROCEDURE — 93296 REM INTERROG EVL PM/IDS: CPT | Performed by: INTERNAL MEDICINE

## 2024-12-24 PROCEDURE — 93295 DEV INTERROG REMOTE 1/2/MLT: CPT | Performed by: INTERNAL MEDICINE

## 2024-12-24 PROCEDURE — 99214 OFFICE O/P EST MOD 30 MIN: CPT | Performed by: FAMILY MEDICINE

## 2024-12-24 RX ORDER — CLOPIDOGREL BISULFATE 75 MG/1
75 TABLET ORAL DAILY
Qty: 90 TABLET | Refills: 3 | Status: SHIPPED | OUTPATIENT
Start: 2024-12-24

## 2024-12-24 NOTE — TELEPHONE ENCOUNTER
Medication Auth for Ozempic for [facility] delivery needed - Thank you   Orders:    semaglutide, 0.25 or 0.5 mg/dose, (Ozempic, 0.25 or 0.5 MG/DOSE,) 2 mg/3 mL injection pen; Inject 0.375 mL (0.25 mg total) under the skin every 7 days for 28 days, THEN 0.75 mL (0.5 mg total) every 7 days for 28 days. 0.25 mg under the skin every 7 days for 4 doses (28 days), THEN 0.5 mg under the skin every 7 days.    semaglutide, 1 mg/dose, (Ozempic) 4 mg/3 mL injection pen; Inject 0.75 mL (1 mg total) under the skin once a week for 28 days Do not start before February 17, 2025.    semaglutide, 2 mg/dose, (Ozempic) 8 mg/ mL injection pen; Inject 0.75 mL (2 mg total) under the skin every 7 days Do not start before March 17, 2025.

## 2024-12-24 NOTE — PROGRESS NOTES
Name: Vesna Langston      : 1960      MRN: [id number]  Encounter Provider: Melissa Eldridge DO  Encounter Date: 2024   Encounter department: [facility] PRIMARY CARE  :  Assessment & Plan  Type 2 diabetes mellitus without complication, without long-term current use of insulin (HCC)    Lab Results   Component Value Date    HGBA1C 7.2 (H) 2024     Chronic, near goal of A1C <7.0  My understanding was metformin was not the first choice given CVD and declining renal fxn this year (GFR from 90>51) - initially was started on Jardiance given CKD and CAD - did not tolerate due to yeast infections. This was stopped and she was started on Glimepiride. This unfortunately has been causing episodes of hypoglycemia and I would prefer an alternate option. I recommend a GLP-1 agonist given its benefits for CVD.  Continue glimepiride 1 mg daily - may need to decrease once patient is up to maintenance dose of Ozempic or sooner given report of hypoglycemic episodes   Start ozempic 0.25mg weekly for 4 weeks, then increase to 0.5 mg weekly for 4 weeks then increase to 1.0 mg weekly for 4 weeks, then increase to 2.0 mg weekly for maintenance dose  Patient may be having back surgery in March, instructed she would need to hold Ozempic for 2 weeks prior to surgery  Continue with healthy lifestyle changes  Due for labs at the end of December - prev ordered  On ARB, statin  Diabetic eye exam up-to-date, completes these in [place], Dr. Caceres  Diabetic foot exam up-to-date  Follow-up as scheduled in March    Orders:    semaglutide, 0.25 or 0.5 mg/dose, (Ozempic, 0.25 or 0.5 MG/DOSE,) 2 mg/3 mL injection pen; Inject 0.375 mL (0.25 mg total) under the skin every 7 days for 28 days, THEN 0.75 mL (0.5 mg total) every 7 days for 28 days. 0.25 mg under the skin every 7 days for 4 doses (28 days), THEN 0.5 mg under the skin every 7 days.    semaglutide, 1 mg/dose, (Ozempic) 4 mg/3 mL injection pen; Inject 0.75 mL  (1 mg total) under the skin once a week for 28 days Do not start before February 17, 2025.    semaglutide, 2 mg/dose, (Ozempic) 8 mg/ mL injection pen; Inject 0.75 mL (2 mg total) under the skin every 7 days Do not start before March 17, 2025.      Class 1 obesity due to excess calories with serious comorbidity and body mass index (BMI) of 34.0 to 34.9 in adult  Patient making great efforts with lifestyle changes, continue  Adding Ozempic for better DM control as patient would also benefit from weight loss effects and lowering CVD risk as well with this medication.   Continue to monitor    Prior Authorization Clinical Questions for Weight Management Pharmacotherapy    1. Does the patient have a contrainidcation to medication prescribed for weight management?: No  2. Does the patient have a diagnosis of obesity, confirmed by a BMI greater than or equal to 30 kg/m^2?: Yes  3. Does the patient have a BMI of greater than or equal to 27 kg/m^2 with at least one weight-related comorbidity/risk factor/complication (e.g. diabetes, dyslipidemia, coronary artery disease)?: Yes  4. Weight-related co-morbidities/risk factors: type 2 diabetes, dyslipidemia, hypertension, cardiovascular disease, obstructive sleep apnea (POP)  5. Has the patient been on a weight loss regimen of low-calorie diet, increased physical activity, and lifestyle modifications for a minimum of 6 months?: Yes  6. Has the patient completed a comprehensive weight loss program (ie, Weight Watchers, Noom, Bariatrics, other raya on phone)? If so, what?: No  7. Does the patient have a history of type 2 diabetes?: Yes  8. Has the member tried and failed other weight loss medication within the past 12 months?: No  9. Will the member use requested medication in combination with another GLP agonist or weight loss drug?: No  10. Is the medication a controlled substance?: No     Baseline weight (in pounds): 223 lbs           Orders:    semaglutide, 0.25 or 0.5 mg/dose,  (Ozempic, 0.25 or 0.5 MG/DOSE,) 2 mg/3 mL injection pen; Inject 0.375 mL (0.25 mg total) under the skin every 7 days for 28 days, THEN 0.75 mL (0.5 mg total) every 7 days for 28 days. 0.25 mg under the skin every 7 days for 4 doses (28 days), THEN 0.5 mg under the skin every 7 days.    semaglutide, 1 mg/dose, (Ozempic) 4 mg/3 mL injection pen; Inject 0.75 mL (1 mg total) under the skin once a week for 28 days Do not start before February 17, 2025.    semaglutide, 2 mg/dose, (Ozempic) 8 mg/ mL injection pen; Inject 0.75 mL (2 mg total) under the skin every 7 days Do not start before March 17, 2025.      Mixed hyperlipidemia  Lab Results   Component Value Date    CHOLESTEROL 194 09/27/2024    TRIG 168 (H) 09/27/2024    HDL 57 09/27/2024    LDLCALC 103 (H) 09/27/2024     Chronic, stable but not at goal in setting of DM, goal LDL <70  Continue atorvastatin  Mgmt of DM and Obesity as above  Continue to monitor  Orders:    semaglutide, 0.25 or 0.5 mg/dose, (Ozempic, 0.25 or 0.5 MG/DOSE,) 2 mg/3 mL injection pen; Inject 0.375 mL (0.25 mg total) under the skin every 7 days for 28 days, THEN 0.75 mL (0.5 mg total) every 7 days for 28 days. 0.25 mg under the skin every 7 days for 4 doses (28 days), THEN 0.5 mg under the skin every 7 days.    semaglutide, 1 mg/dose, (Ozempic) 4 mg/3 mL injection pen; Inject 0.75 mL (1 mg total) under the skin once a week for 28 days Do not start before February 17, 2025.    semaglutide, 2 mg/dose, (Ozempic) 8 mg/ mL injection pen; Inject 0.75 mL (2 mg total) under the skin every 7 days Do not start before March 17, 2025.      Coronary artery disease involving native coronary artery of native heart without angina pectoris  Status post stent placement  Follows with cardiology, Dr. Bell with [facility] cardiology  Medications: Entresto, metoprolol XL, atorvastatin, Plavix  Continue to follow-up cardiology recommendations  Start ozempic to further lower CVD risk   Orders:    semaglutide, 0.25 or  0.5 mg/dose, (Ozempic, 0.25 or 0.5 MG/DOSE,) 2 mg/3 mL injection pen; Inject 0.375 mL (0.25 mg total) under the skin every 7 days for 28 days, THEN 0.75 mL (0.5 mg total) every 7 days for 28 days. 0.25 mg under the skin every 7 days for 4 doses (28 days), THEN 0.5 mg under the skin every 7 days.    semaglutide, 1 mg/dose, (Ozempic) 4 mg/3 mL injection pen; Inject 0.75 mL (1 mg total) under the skin once a week for 28 days Do not start before February 17, 2025.    semaglutide, 2 mg/dose, (Ozempic) 8 mg/ mL injection pen; Inject 0.75 mL (2 mg total) under the skin every 7 days Do not start before March 17, 2025.      POP (obstructive sleep apnea)  [facility]  Mgmt of obesity as above  Orders:    semaglutide, 0.25 or 0.5 mg/dose, (Ozempic, 0.25 or 0.5 MG/DOSE,) 2 mg/3 mL injection pen; Inject 0.375 mL (0.25 mg total) under the skin every 7 days for 28 days, THEN 0.75 mL (0.5 mg total) every 7 days for 28 days. 0.25 mg under the skin every 7 days for 4 doses (28 days), THEN 0.5 mg under the skin every 7 days.    semaglutide, 1 mg/dose, (Ozempic) 4 mg/3 mL injection pen; Inject 0.75 mL (1 mg total) under the skin once a week for 28 days Do not start before February 17, 2025.    semaglutide, 2 mg/dose, (Ozempic) 8 mg/ mL injection pen; Inject 0.75 mL (2 mg total) under the skin every 7 days Do not start before March 17, 2025.      STEMI (ST elevation myocardial infarction) ([facility])    Orders:    clopidogrel (PLAVIX) 75 mg tablet; Take 1 tablet (75 mg total) by mouth daily    ST elevation myocardial infarction involving left anterior descending (LAD) coronary artery ([facility])    Orders:    clopidogrel (PLAVIX) 75 mg tablet; Take 1 tablet (75 mg total) by mouth daily      Return for Next scheduled follow up.       History of Present Illness   Chief Complaint   Patient presents with    Follow-up       Diabetes  She has type 2  diabetes mellitus. MedicAlert identification noted. The initial diagnosis of diabetes was made 8 months ago. Hypoglycemia symptoms include dizziness, hunger, sleepiness, sweats and tremors. Pertinent negatives for hypoglycemia include no confusion, headaches, mood changes, nervousness/anxiousness, pallor, seizures or speech difficulty. Associated symptoms include chest pain, fatigue, polyphagia, polyuria and weakness. Pertinent negatives for diabetes include no blurred vision, no foot paresthesias, no foot ulcerations, no polydipsia, no visual change and no weight loss. Pertinent negatives for hypoglycemia complications include no blackouts, no hospitalization, no nocturnal hypoglycemia, no required assistance and no required glucagon injection. Symptoms are worsening. Diabetic complications include heart disease, nephropathy and PVD. Pertinent negatives for diabetic complications include no CVA, impotence, peripheral neuropathy or retinopathy. Risk factors for coronary artery disease include dyslipidemia, hypertension, obesity, post-menopausal and stress. Current diabetic treatment includes diet and oral agent (monotherapy). She is compliant with treatment all of the time. Her weight is increasing rapidly. She is following a generally healthy, low fat/cholesterol and low salt diet. Meal planning includes avoidance of concentrated sweets and carbohydrate counting. She has had a previous visit with a dietitian. She participates in exercise every other day. She monitors blood glucose at home 1-2 x per day. Blood glucose monitoring compliance is good. Her home blood glucose trend is fluctuating dramatically. Her breakfast blood glucose is taken between 6-7 am. Her breakfast blood glucose range is generally 110-130 mg/dl. Her lunch blood glucose is taken between 12-1 pm. Her dinner blood glucose is taken between 5-6 pm. Her dinner blood glucose range is generally 140-180 mg/dl. Her bedtime blood glucose is taken between  "9-10 pm. Her bedtime blood glucose range is generally 140-180 mg/dl. Her overall blood glucose range is 130-140 mg/dl. She does not see a podiatrist.Eye exam is current.     Review of Systems   Constitutional:  Positive for fatigue. Negative for weight loss.   Eyes:  Negative for blurred vision.   Cardiovascular:  Positive for chest pain.   Endocrine: Positive for polyphagia and polyuria. Negative for polydipsia.   Genitourinary:  Negative for impotence.   Skin:  Negative for pallor.   Neurological:  Positive for dizziness, tremors and weakness. Negative for seizures, speech difficulty and headaches.   Psychiatric/Behavioral:  Negative for confusion. The patient is not nervous/anxious.        Objective   BP 92/50 (BP Location: Left arm, Patient Position: Sitting, Cuff Size: Large)   Pulse 70   Temp 97.7 °F (36.5 °C) (Oral)   Ht 5' 8\" (1.727 m)   Wt 101 kg (223 lb 12.3 oz)   SpO2 99%   BMI 34.02 kg/m²      Physical Exam  Vitals reviewed.   Constitutional:       General: She is not in acute distress.     Appearance: She is obese. She is not ill-appearing.   HENT:      Head: Normocephalic and atraumatic.      Right Ear: External ear normal.      Left Ear: External ear normal.      Nose: Nose normal.   Eyes:      Extraocular Movements: Extraocular movements intact.      Conjunctiva/sclera: Conjunctivae normal.   Cardiovascular:      Rate and Rhythm: Normal rate.   Pulmonary:      Effort: Pulmonary effort is normal.   Musculoskeletal:      Cervical back: Neck supple.      Right lower leg: No edema.      Left lower leg: No edema.   Neurological:      General: No focal deficit present.      Mental Status: She is alert.   Psychiatric:         Mood and Affect: Mood normal.         Behavior: Behavior normal.         "

## 2024-12-24 NOTE — ASSESSMENT & PLAN NOTE
Patient making great efforts with lifestyle changes, continue  Adding Ozempic for better DM control as patient would also benefit from weight loss effects and lowering CVD risk as well with this medication.   Continue to monitor    Prior Authorization Clinical Questions for Weight Management Pharmacotherapy    1. Does the patient have a contrainidcation to medication prescribed for weight management?: No  2. Does the patient have a diagnosis of obesity, confirmed by a BMI greater than or equal to 30 kg/m^2?: Yes  3. Does the patient have a BMI of greater than or equal to 27 kg/m^2 with at least one weight-related comorbidity/risk factor/complication (e.g. diabetes, dyslipidemia, coronary artery disease)?: Yes  4. Weight-related co-morbidities/risk factors: type 2 diabetes, dyslipidemia, hypertension, cardiovascular disease, obstructive sleep apnea (POP)  5. Has the patient been on a weight loss regimen of low-calorie diet, increased physical activity, and lifestyle modifications for a minimum of 6 months?: Yes  6. Has the patient completed a comprehensive weight loss program (ie, Weight Watchers, Noom, Bariatrics, other raya on phone)? If so, what?: No  7. Does the patient have a history of type 2 diabetes?: Yes  8. Has the member tried and failed other weight loss medication within the past 12 months?: No  9. Will the member use requested medication in combination with another GLP agonist or weight loss drug?: No  10. Is the medication a controlled substance?: No     Baseline weight (in pounds): 223 lbs           Orders:    semaglutide, 0.25 or 0.5 mg/dose, (Ozempic, 0.25 or 0.5 MG/DOSE,) 2 mg/3 mL injection pen; Inject 0.375 mL (0.25 mg total) under the skin every 7 days for 28 days, THEN 0.75 mL (0.5 mg total) every 7 days for 28 days. 0.25 mg under the skin every 7 days for 4 doses (28 days), THEN 0.5 mg under the skin every 7 days.    semaglutide, 1 mg/dose, (Ozempic) 4 mg/3 mL injection pen; Inject 0.75 mL (1  mg total) under the skin once a week for 28 days Do not start before February 17, 2025.    semaglutide, 2 mg/dose, (Ozempic) 8 mg/ mL injection pen; Inject 0.75 mL (2 mg total) under the skin every 7 days Do not start before March 17, 2025.

## 2024-12-24 NOTE — TELEPHONE ENCOUNTER
PA for Ozempic 0.25 or 0.5MG/DOSE 2mg/3mL SUBMITTED to [employer]    via    []CMM-KEY:   [x]Surescripts-Case ID # [id number]   []Availity-Auth ID # NDC #   []Faxed to plan   []Other website   []Phone call Case ID #     [x]PA sent as URGENT    All office notes, labs and other pertaining documents and studies sent. Clinical questions answered. Awaiting determination from insurance company.     Turnaround time for your insurance to make a decision on your Prior Authorization can take 7-21 business days.

## 2024-12-24 NOTE — ASSESSMENT & PLAN NOTE
Status post stent placement  Follows with cardiology, Dr. Bell with [facility] cardiology  Medications: Entresto, metoprolol XL, atorvastatin, Plavix  Continue to follow-up cardiology recommendations  Start ozempic to further lower CVD risk   Orders:    semaglutide, 0.25 or 0.5 mg/dose, (Ozempic, 0.25 or 0.5 MG/DOSE,) 2 mg/3 mL injection pen; Inject 0.375 mL (0.25 mg total) under the skin every 7 days for 28 days, THEN 0.75 mL (0.5 mg total) every 7 days for 28 days. 0.25 mg under the skin every 7 days for 4 doses (28 days), THEN 0.5 mg under the skin every 7 days.    semaglutide, 1 mg/dose, (Ozempic) 4 mg/3 mL injection pen; Inject 0.75 mL (1 mg total) under the skin once a week for 28 days Do not start before February 17, 2025.    semaglutide, 2 mg/dose, (Ozempic) 8 mg/ mL injection pen; Inject 0.75 mL (2 mg total) under the skin every 7 days Do not start before March 17, 2025.

## 2024-12-24 NOTE — ASSESSMENT & PLAN NOTE
Lab Results   Component Value Date    CHOLESTEROL 194 09/27/2024    TRIG 168 (H) 09/27/2024    HDL 57 09/27/2024    LDLCALC 103 (H) 09/27/2024     Chronic, stable but not at goal in setting of DM, goal LDL <70  Continue atorvastatin  Mgmt of DM and Obesity as above  Continue to monitor  Orders:    semaglutide, 0.25 or 0.5 mg/dose, (Ozempic, 0.25 or 0.5 MG/DOSE,) 2 mg/3 mL injection pen; Inject 0.375 mL (0.25 mg total) under the skin every 7 days for 28 days, THEN 0.75 mL (0.5 mg total) every 7 days for 28 days. 0.25 mg under the skin every 7 days for 4 doses (28 days), THEN 0.5 mg under the skin every 7 days.    semaglutide, 1 mg/dose, (Ozempic) 4 mg/3 mL injection pen; Inject 0.75 mL (1 mg total) under the skin once a week for 28 days Do not start before February 17, 2025.    semaglutide, 2 mg/dose, (Ozempic) 8 mg/ mL injection pen; Inject 0.75 mL (2 mg total) under the skin every 7 days Do not start before March 17, 2025.

## 2024-12-24 NOTE — TELEPHONE ENCOUNTER
Patient contacted the office this afternoon in regards to the Ozempic. Patient would like the 3 month supply (different doses) [facility] [place] or [place] depending on which would have it in stock. She states the [facility] does not have it in stock and were unsure when it would become available. I did update patient that appeal was submitted to insurance. Will wait for a return call once updated.

## 2024-12-24 NOTE — ASSESSMENT & PLAN NOTE
[facility]  Mgmt of obesity as above  Orders:    semaglutide, 0.25 or 0.5 mg/dose, (Ozempic, 0.25 or 0.5 MG/DOSE,) 2 mg/3 mL injection pen; Inject 0.375 mL (0.25 mg total) under the skin every 7 days for 28 days, THEN 0.75 mL (0.5 mg total) every 7 days for 28 days. 0.25 mg under the skin every 7 days for 4 doses (28 days), THEN 0.5 mg under the skin every 7 days.    semaglutide, 1 mg/dose, (Ozempic) 4 mg/3 mL injection pen; Inject 0.75 mL (1 mg total) under the skin once a week for 28 days Do not start before February 17, 2025.    semaglutide, 2 mg/dose, (Ozempic) 8 mg/ mL injection pen; Inject 0.75 mL (2 mg total) under the skin every 7 days Do not start before March 17, 2025.       The patient is a 39y Female complaining of agitation.

## 2024-12-24 NOTE — ASSESSMENT & PLAN NOTE
Lab Results   Component Value Date    HGBA1C 7.2 (H) 09/27/2024     Chronic, near goal of A1C <7.0  My understanding was metformin was not the first choice given CVD and declining renal fxn this year (GFR from 90>51) - initially was started on Jardiance given CKD and CAD - did not tolerate due to yeast infections. This was stopped and she was started on Glimepiride. This unfortunately has been causing episodes of hypoglycemia and I would prefer an alternate option. I recommend a GLP-1 agonist given its benefits for CVD.  Continue glimepiride 1 mg daily - may need to decrease once patient is up to maintenance dose of Ozempic or sooner given report of hypoglycemic episodes   Start ozempic 0.25mg weekly for 4 weeks, then increase to 0.5 mg weekly for 4 weeks then increase to 1.0 mg weekly for 4 weeks, then increase to 2.0 mg weekly for maintenance dose  Patient may be having back surgery in March, instructed she would need to hold Ozempic for 2 weeks prior to surgery  Continue with healthy lifestyle changes  Due for labs at the end of December - prev ordered  On ARB, statin  Diabetic eye exam up-to-date, completes these in [place], Dr. Caceres  Diabetic foot exam up-to-date  Follow-up as scheduled in March    Orders:    semaglutide, 0.25 or 0.5 mg/dose, (Ozempic, 0.25 or 0.5 MG/DOSE,) 2 mg/3 mL injection pen; Inject 0.375 mL (0.25 mg total) under the skin every 7 days for 28 days, THEN 0.75 mL (0.5 mg total) every 7 days for 28 days. 0.25 mg under the skin every 7 days for 4 doses (28 days), THEN 0.5 mg under the skin every 7 days.    semaglutide, 1 mg/dose, (Ozempic) 4 mg/3 mL injection pen; Inject 0.75 mL (1 mg total) under the skin once a week for 28 days Do not start before February 17, 2025.    semaglutide, 2 mg/dose, (Ozempic) 8 mg/ mL injection pen; Inject 0.75 mL (2 mg total) under the skin every 7 days Do not start before March 17, 2025.

## 2024-12-24 NOTE — PROGRESS NOTES
"Results for orders placed or performed in visit on 12/24/24   Cardiac EP device report    Narrative    MDT DUAL ICD (MVP ON) / ACTIVE SYSTEM IS MRI CONDITIONAL  CARELINK TRANSMISSION: BATTERY STATUS \"12 YRS.\" AP 44%  0%. ALL AVAILABLE LEAD PARAMETERS WITHIN NORMAL LIMITS. NO SIGNIFICANT HIGH RATE EPISODES. OPTI-VOL WITHIN NORMAL LIMITS. NORMAL DEVICE FUNCTION. NC         "

## 2024-12-24 NOTE — TELEPHONE ENCOUNTER
PA for Ozempic 0.25 or 0.5MG/DOSE 2mg/3mL APPEALED    via     []CMM  []SS  [x]Letter sent to insurance via fax [phone]  []Other site or means     All necessary records sent. Will await response from insurance company    Turnaround time for a decision to be made on an appeal could take up to 30 business days

## 2024-12-24 NOTE — TELEPHONE ENCOUNTER
PA for Ozempic 0.25 or 0.5MG/DOSE 2mg/3mL DENIED    Reason:    Message sent to office clinical pool Yes    Denial letter scanned into Media Yes    Appeal started No (Provider will need to decide if appeal is warranted and send clinical documentation to Prior Authorization Team for initiation.)    **Please follow up with your patient regarding denial and next steps**

## 2024-12-25 ENCOUNTER — RESULTS FOLLOW-UP (OUTPATIENT)
Dept: CARDIOLOGY CLINIC | Facility: CLINIC | Age: 64
End: 2024-12-25

## 2024-12-25 DIAGNOSIS — I50.22 HEART FAILURE WITH MID-RANGE EJECTION FRACTION (HFMEF) (HCC): ICD-10-CM

## 2024-12-26 RX ORDER — POTASSIUM CHLORIDE 1500 MG/1
20 TABLET, EXTENDED RELEASE ORAL DAILY
Qty: 90 TABLET | Refills: 1 | Status: SHIPPED | OUTPATIENT
Start: 2024-12-26

## 2024-12-26 NOTE — TELEPHONE ENCOUNTER
Patient requesting an update on Rx: Ozempic Appeal.  Requesting medication be sent to [address]

## 2024-12-27 ENCOUNTER — APPOINTMENT (OUTPATIENT)
Dept: LAB | Facility: HOSPITAL | Age: 64
End: 2024-12-27
Payer: COMMERCIAL

## 2024-12-27 LAB
ANION GAP SERPL CALCULATED.3IONS-SCNC: 8 MMOL/L (ref 4–13)
BASOPHILS # BLD AUTO: 0.08 THOUSANDS/ΜL (ref 0–0.1)
BASOPHILS NFR BLD AUTO: 1 % (ref 0–1)
BUN SERPL-MCNC: 16 MG/DL (ref 5–25)
CALCIUM SERPL-MCNC: 9.3 MG/DL (ref 8.4–10.2)
CHLORIDE SERPL-SCNC: 102 MMOL/L (ref 96–108)
CHOLEST SERPL-MCNC: 174 MG/DL (ref ?–200)
CO2 SERPL-SCNC: 28 MMOL/L (ref 21–32)
CREAT SERPL-MCNC: 1.05 MG/DL (ref 0.6–1.3)
EOSINOPHIL # BLD AUTO: 0.45 THOUSAND/ΜL (ref 0–0.61)
EOSINOPHIL NFR BLD AUTO: 5 % (ref 0–6)
ERYTHROCYTE [DISTWIDTH] IN BLOOD BY AUTOMATED COUNT: 15 % (ref 11.6–15.1)
EST. AVERAGE GLUCOSE BLD GHB EST-MCNC: 157 MG/DL
GFR SERPL CREATININE-BSD FRML MDRD: 55 ML/MIN/1.73SQ M
GLUCOSE P FAST SERPL-MCNC: 117 MG/DL (ref 65–99)
HBA1C MFR BLD: 7.1 %
HCT VFR BLD AUTO: 40.2 % (ref 34.8–46.1)
HDLC SERPL-MCNC: 42 MG/DL
HGB BLD-MCNC: 12.3 G/DL (ref 11.5–15.4)
IMM GRANULOCYTES # BLD AUTO: 0.02 THOUSAND/UL (ref 0–0.2)
IMM GRANULOCYTES NFR BLD AUTO: 0 % (ref 0–2)
LDLC SERPL CALC-MCNC: 88 MG/DL (ref 0–100)
LYMPHOCYTES # BLD AUTO: 2.8 THOUSANDS/ΜL (ref 0.6–4.47)
LYMPHOCYTES NFR BLD AUTO: 34 % (ref 14–44)
MCH RBC QN AUTO: 26.4 PG (ref 26.8–34.3)
MCHC RBC AUTO-ENTMCNC: 30.6 G/DL (ref 31.4–37.4)
MCV RBC AUTO: 86 FL (ref 82–98)
MONOCYTES # BLD AUTO: 0.7 THOUSAND/ΜL (ref 0.17–1.22)
MONOCYTES NFR BLD AUTO: 8 % (ref 4–12)
NEUTROPHILS # BLD AUTO: 4.25 THOUSANDS/ΜL (ref 1.85–7.62)
NEUTS SEG NFR BLD AUTO: 52 % (ref 43–75)
NONHDLC SERPL-MCNC: 132 MG/DL
NRBC BLD AUTO-RTO: 0 /100 WBCS
PLATELET # BLD AUTO: 292 THOUSANDS/UL (ref 149–390)
PMV BLD AUTO: 9.6 FL (ref 8.9–12.7)
POTASSIUM SERPL-SCNC: 3.8 MMOL/L (ref 3.5–5.3)
RBC # BLD AUTO: 4.66 MILLION/UL (ref 3.81–5.12)
SODIUM SERPL-SCNC: 138 MMOL/L (ref 135–147)
TRIGL SERPL-MCNC: 218 MG/DL (ref ?–150)
TSH SERPL DL<=0.05 MIU/L-ACNC: 4.09 UIU/ML (ref 0.45–4.5)
WBC # BLD AUTO: 8.3 THOUSAND/UL (ref 4.31–10.16)

## 2024-12-27 PROCEDURE — 36415 COLL VENOUS BLD VENIPUNCTURE: CPT

## 2024-12-30 ENCOUNTER — TELEPHONE (OUTPATIENT)
Dept: FAMILY MEDICINE CLINIC | Facility: CLINIC | Age: 64
End: 2024-12-30

## 2024-12-30 ENCOUNTER — RESULTS FOLLOW-UP (OUTPATIENT)
Dept: FAMILY MEDICINE CLINIC | Facility: CLINIC | Age: 64
End: 2024-12-30

## 2024-12-30 DIAGNOSIS — I25.118 CORONARY ARTERY DISEASE OF NATIVE HEART WITH STABLE ANGINA PECTORIS, UNSPECIFIED VESSEL OR LESION TYPE (HCC): ICD-10-CM

## 2024-12-30 RX ORDER — ISOSORBIDE MONONITRATE 30 MG/1
30 TABLET, EXTENDED RELEASE ORAL DAILY
Qty: 30 TABLET | Refills: 17 | Status: SHIPPED | OUTPATIENT
Start: 2024-12-30 | End: 2025-01-03

## 2024-12-30 RX ORDER — ISOSORBIDE MONONITRATE 30 MG/1
30 TABLET, EXTENDED RELEASE ORAL DAILY
Qty: 90 TABLET | Refills: 5 | Status: SHIPPED | OUTPATIENT
Start: 2024-12-30 | End: 2024-12-30

## 2024-12-30 NOTE — TELEPHONE ENCOUNTER
Called patient to confirm what pharmacy she wanted the Ozempic to go to if and when it gets covered. There were some messages for [facility] as well and [facility]. She wants it sent to [address]

## 2025-01-01 DIAGNOSIS — I21.3 STEMI (ST ELEVATION MYOCARDIAL INFARCTION) (HCC): ICD-10-CM

## 2025-01-01 DIAGNOSIS — I21.02 ST ELEVATION MYOCARDIAL INFARCTION INVOLVING LEFT ANTERIOR DESCENDING (LAD) CORONARY ARTERY (HCC): ICD-10-CM

## 2025-01-02 LAB
LEFT EYE DIABETIC RETINOPATHY: NORMAL
RIGHT EYE DIABETIC RETINOPATHY: NORMAL

## 2025-01-02 RX ORDER — CLOPIDOGREL BISULFATE 75 MG/1
75 TABLET ORAL DAILY
Qty: 90 TABLET | Refills: 0 | OUTPATIENT
Start: 2025-01-02

## 2025-01-02 NOTE — PROGRESS NOTES
"Advanced Heart Failure/Pulmonary Hypertension Outpatient Note - Vesna Langston 64 y.o. female MRN: [id number]    @ Encounter: [id number]    Assessment:  64 y.o. female PMH and acute problems listed later in this note (a partial list may also be included within 'assessment' section) p/w HF fu.  I first met Vesna Langston on 5/6/24.    Primarily ICM, HFimpEF, LVEF 30-35%>PCI+gdmt>40-45% 10/2024 TTE, nondilated LV  LHC 03/22/2024: s/p PCI to 100% stenosis of ostial/proximal LAD. No other residual dz elsewhere.  cMRI 03/26/2024: LVEF 20%. LVIDd 4.5 cm. \"Transmural scarring of the mid anterior, anteroseptal and inferoseptal walls, the apical anterior, septal and inferior walls and the apex.\"   Coronary artery disease  S/p MI in 03/2024; received PCI to 100% stenosis of ostial/proximal LAD. No residual dz elsewhere.  History of monomorphic ventricular tachycardia              Per EP notes, felt to be scar mediated.               S/p secondary prevention ICD.               was on amiodarone per EP.  transitioned to dofetilide 125mcg twice daily 11/2024 during elective tikosyn admit.  Atrial flutter, paroxysmal               Anticoagulation on Eliquis.   PE 5/2024. CTA: Single subsegmental right lower lobe pulmonary embolus as shown on abdominal CT.   Hyperlipidemia  Diabetes mellitus, type II  Chronic back pain, multiple past surgeries, follows surgical team in [facility] [place]. Dr. Pena  History of tobacco abuse  4/7/24 CT: IMPRESSION:  Suspected small hemorrhage from the anterior wall of the distal gastric body with focal wall thickening. Bleeding due to underlying lesion or PUD is suspected. Recommend endoscopic correlation.  Past heavy NSAID use, 2024 stopped  Lung and spleen granulomas.  Works in [employer], no heavy lifting she says      I have reviewed all pertinent patient data including but not limited to:        Lab Units 12/27/24  0638 12/21/24  1948 12/16/24  0635   CREATININE mg/dL 1.05 1.12 0.92               Lab " "Results   Component Value Date    K 3.8 12/27/2024     Lab Results   Component Value Date    HGBA1C 7.1 (H) 12/27/2024     Lab Results   Component Value Date    LES5YKTJCUBE 4.087 12/27/2024     Lab Results   Component Value Date    LDLCALC 88 12/27/2024     Lab Results   Component Value Date    BNP 78 11/30/2024      No results found for: \"NTBNP\"       Home scale dry weight 218lbs  When decompensated she has been 227lbs    TODAY'S PLAN:     01/03/25  Warm, euvolemic  No new sob, energy level acceptable to pt  Ongoing intermittent CP, repeat ED visit with neg workup. Had recurrent CP in setting of labile BP (the day of CP on her home log her DBP was 122), she had also exerted herself very heavily all day (gym fro 1.5 hours, making multiple kinds of jam in kitchen for hours), she has been cautioned frequently about overexertion.  BP lower today though wnl on her home log. No dizziness.    We have been escalating imdur, now to 30 bid  Cw prn nitro SL (CP usually better after 1 SL nitro  Further antianginals as indicated, and better BP control (note she has also had hypotension and dizziness in past causing cessation of certain elements of gdmt)    She usually takes lasix 40 bid with self titration for weight gain and SOB. She is completing 4 day burst of lasix 80 bid for 7 lbs weight gain over 5 days (no diet indiscretion). Then she will return to 40 bid.  She requests to reattempt jardiance now, which will also have some diuretic effect. She felt well on this drug in past.  Extensive discussions about risks vs benefits as previously documented. She has had UTI in past causing sglt2i cessation, she recognizes risks and wishes to reattempt today.    Gdmt below  Limited by hypotension in past, her regimen has been interrupted intermittently due to hypotension in 2024  Reattempt sglt2i  Has ICD for VT  No MR    On AC+plavix; ideally no pause until 3/2025 for any non-urgent surgical reason > we reviewed implications for " her upcoming 3/2025 back surgery and we will have 2/2025 visit before surgery  On high intens statin    On tikosyn per EP    Deranged Thyroids and DM per PCP    Follows with sleep medicine, planned on Tx for POP, has not started yet per prior notes - advised begin planned Tx as soon as able on multiple occasions     Follow up:  With me in 2/2025 as planned, or sooner if symptoms evolve  In addition to follow up with their other medical providers    Key info from my prior notes:    Strict RTC precautions given  Fu in 2 weeks  She will call in 4-5 days with symptom check in  Low threshold repeat echo  Doubt new PE but monitor closely    Home weight up 217 > 220lbs today, feels mild leg swelling, bloating; has been much more SOB x 3 days which began suddenly - previously felt very well for first few days after recent discharge (had elective tiksoyn initiation admit). Has ongoing unremitting LUE numbness from inner biceps area to hand (not more proximally) - ongoing x days after moved boxes with grandkids in attic during weekend, up to 40lbs.  No new chest pain  No syncope  Worsening hypotension  Minimal salt intake, drinks about 50-55 oz water daily she says  She was treated for yeast infx x 3days per PCP, she says her symptoms entirely resolved, no new pain, no fevers.   She does note she is recovering from sinusitis.    Messaged EP team today regarding QTc trend  I cannot see their ECG from today in muse or epic media tab, priors borderline  Fu EP for tikosyn plan    Will need to back off gdmt at least for now 2/2 hypotension and her Sx, reductions as below    Now seeing nutritionist, no longer doing extreme caloric restriction to 800 cals per day, as she was in recent past  Hydrating better    She has been consuming only 800 calories a day for 1 month or longer in an extreme attempt to lose weight > advised against this. Nutrition referral given today.    Trial imdur for any component of angina  Then 1 week later up  entresto and fu BMP afterwards; this will also help with volume management  Cw lasix 20 qd standing for now, which is recent increase from prior    Warm, euvolemic  Has increased lasix 20 prn to qdaily standing for past 1 week for increased weight, mild sob, mild edema of feet - no major improvement.  Today is last minute urgent visit for ED visit 8/25 for CP in setting of home  transiently, ACS ruled out. Then 8/26 yesterday had episode of vomiting, faintness, no LOC, no chest pain, has felt better since then. No ICD shocks, no palpitations. Did not improve with meclizine. Normal-higher blood sugars at home recently/during events.  Very rare opioid use she says for her back pain issues, none since 1-2 weeks ago    Follows GI    Discussed therapeutic lifestyle changes, low-moderate intensity exercise, maintain acceptable hydration 64 oz water daily, salt restrict, Mediterranean vs DASH diet, weight loss  Avoid nsaids    Further review of return to work next week  Safest plan would be no driving x 3 mo - resume 6/2024 if remains stable    Pharmacotherapies / Neurohormonal Blockade:  --Beta Blocker: metoprolol succinate 50 qd  --ARNi / ACEi / ARB: entresto 49/51 bid > pause 12/2/24 for hypotension (near term resumption planned)>resume entresto 49/51 bid 12/13/24 (low threshold reduce dose)  --Aldosterone Antagonist: aldactone 25 qd > pause 12/2/24 for hypotension   --SGLT2 Inhibitor: jardiance 25 qd for HF and DM > subsequently Sglt2i stopped in early 2024 2/2 yeast infx>9/10/24 Re-attempt lower sglt2i dose now, jardiance 10 qd, long discussion risks vs benefits and this is pt wish> DC 11/2024 2/2 recurrent yeast infx (may consider repeat re-attempt future, pt indicates desire for this 12/2/24) > plan above, pt requests reattempt 1/3/25, resuming 25 qd    --Diuretic: lasix 40 bid, potassium 20 qd     Sudden Cardiac Death Risk Reduction:  --Medtronic dual chamber ICD in situ since 03/2024 (secondary prevention).  "  12/24/24  MDT DUAL ICD (MVP ON) / ACTIVE SYSTEM IS MRI CONDITIONAL   CARELINK TRANSMISSION: BATTERY STATUS \"12 YRS.\" AP 44%  0%. ALL AVAILABLE LEAD PARAMETERS WITHIN NORMAL LIMITS. NO SIGNIFICANT HIGH RATE EPISODES. OPTI-VOL WITHIN NORMAL LIMITS. NORMAL DEVICE FUNCTION.    Electrical Resynchronization:  --Candidacy for BiV device: narrow QRS.      Advanced Therapies: Will continue to monitor.    Studies:  I have reviewed all pertinent patient data/labs/imaging where available, including but not limited to the below studies. Selected results may be displayed here but comprehensive listing is omitted for note clarity and can be found in the epic chart.    ECG.    Echo.    Stress.    Cath.    HPI:   64 y.o. female PMH and acute problems listed later in this note (a partial list may also be included within 'assessment' section) p/w HF fu.  I first met Vesnamarielena Langston on 5/6/24.  No new CP/SOB/dizziness/palpitations/syncope.  No new fatigue.  No new unintentional weight changes.  No new leg swelling, PND, pillow orthopnea.  No new fevers, chills, cough, nausea, vomiting, diarrhea, dysuria.      Interval History:  As noted in 'plan' section above and prior epic chart notes.    No new dizziness/palpitations/syncope.  No new fatigue.  No new PND, pillow orthopnea.  No new fevers, chills, cough, nausea, vomiting, diarrhea, dysuria.    Past Medical History:   Diagnosis Date    Acute respiratory insufficiency 03/22/2024    Allergic     Chronic HFrEF (heart failure with reduced ejection fraction) (HCC)     Coronary artery disease     COVID-19 virus infection 11/28/2022    Diabetes mellitus (HCC)     Disease of thyroid gland 4/09/2024    Heart disease 3/24    Hyperlipidemia     Hypoglycemia     ICD (implantable cardioverter-defibrillator) in place     Ischemic cardiomyopathy     Lactic acidosis 03/22/2024    Myocardial infarction (HCC) 3/22/2024    Otitis media 1966    Seasonal allergies     ST elevation myocardial infarction " involving left anterior descending (LAD) coronary artery ([facility]) 03/22/2024    Tobacco abuse     Visual impairment 1967     Patient Active Problem List    Diagnosis Date Noted    Chronic kidney disease, stage 4 (severe) ([facility]) 01/03/2025    Other pulmonary embolism without acute cor pulmonale, unspecified chronicity ([facility]) 01/03/2025    Acute on chronic systolic (congestive) heart failure ([facility]) 01/03/2025    Obesity, morbid ([facility]) 01/03/2025    Type 2 diabetes mellitus with stage 3 chronic kidney disease, without long-term current use of insulin, unspecified whether stage 3a or 3b CKD ([facility]) 01/03/2025    Numbness and tingling in left arm 11/30/2024    Primary osteoarthritis of right knee 10/08/2024    History of torn meniscus of right knee 10/08/2024    Chronic pain of right knee 10/08/2024    Localized edema 10/02/2024    POP (obstructive sleep apnea) 09/23/2024    Class 1 obesity due to excess calories with serious comorbidity and body mass index (BMI) of 34.0 to 34.9 in adult 08/27/2024    Ganglion cyst of finger of left hand 08/15/2024    History of pulmonary embolism 05/07/2024    History of gastric ulcer 05/07/2024    Acquired hypothyroidism 04/19/2024    Type 2 diabetes mellitus, without long-term current use of insulin ([facility]) 04/07/2024    Syncope 04/01/2024    S/P ICD (internal cardiac defibrillator) procedure 03/27/2024    Chronic low back pain 03/25/2024    HFrEF (heart failure with reduced ejection fraction) ([facility]) 03/25/2024    Ischemic cardiomyopathy 03/24/2024    Coronary artery disease involving native coronary artery of native heart 03/23/2024    S/P coronary artery stent placement 03/23/2024    Atrial flutter, paroxysmal ([facility]) 03/22/2024    Mixed hyperlipidemia 03/22/2024    History of tobacco abuse 03/22/2024    History of sustained ventricular tachycardia 03/22/2024    Back pain 07/31/2022    Sciatica of left side 07/31/2022       ROS:  10 point ROS negative except as specified in HPI/interval  history    Allergies   Allergen Reactions    Fish-Derived Products - Food Allergy Anaphylaxis     Cannot have all seafood products    Shellfish-Derived Products - Food Allergy Anaphylaxis    Azithromycin Hives and Rash     Current Outpatient Medications   Medication Instructions    albuterol (ProAir HFA) 90 mcg/act inhaler 2 puffs, Inhalation, Every 6 hours PRN    apixaban (ELIQUIS) 5 mg, Oral, 2 times daily    atorvastatin (LIPITOR) 80 mg, Oral, Every evening    clopidogrel (PLAVIX) 75 mg, Oral, Daily    diphenhydrAMINE (BENADRYL) 25 mg, Every 6 hours PRN    dofetilide (TIKOSYN) 125 mcg, Oral, Every 12 hours scheduled    Empagliflozin (JARDIANCE) 25 mg, Oral, Every morning    fluticasone (FLONASE) 50 mcg/act nasal spray 1 spray, Nasal, Daily    furosemide (LASIX) 40 mg, Oral, 2 times daily    gabapentin (NEURONTIN) 100 mg, Oral, 2 times daily    glimepiride (AMARYL) 1 mg, Oral, Daily with breakfast    HYDROcodone-acetaminophen (NORCO) 5-325 mg per tablet 1 tablet, Oral, Every 6 hours PRN    isosorbide mononitrate (IMDUR) 30 mg, Oral, Daily, 30 mg in the am and 30 mg in the pm    levothyroxine (SYNTHROID) 75 mcg, Oral, Daily    meclizine (ANTIVERT) 25 mg, Oral, Every 8 hours PRN    metoprolol succinate (TOPROL-XL) 50 mg, Oral, Daily at bedtime    nitroglycerin (NITROSTAT) 0.4 mg, Sublingual, Every 5 minutes PRN    pantoprazole (PROTONIX) 40 mg, Oral, 2 times daily before meals    potassium chloride (Klor-Con M20) 20 mEq tablet 20 mEq, Oral, Daily    sacubitril-valsartan (Entresto) 49-51 MG TABS 1 tablet, Oral, 2 times daily    [START ON 2/17/2025] semaglutide (1 mg/dose) (OZEMPIC) 1 mg, Subcutaneous, Weekly    [START ON 3/17/2025] semaglutide (2 mg/dose) (OZEMPIC) 2 mg, Subcutaneous, Every 7 days    semaglutide, 0.25 or 0.5 mg/dose, (Ozempic, 0.25 or 0.5 MG/DOSE,) 2 mg/3 mL injection pen Inject 0.375 mL (0.25 mg total) under the skin every 7 days for 28 days, THEN 0.75 mL (0.5 mg total) every 7 days for 28 days.  0.25 mg under the skin every 7 days for 4 doses (28 days), THEN 0.5 mg under the skin every 7 days.      Social History     Socioeconomic History    Marital status:      Spouse name: Not on file    Number of children: 3    Years of education: Not on file    Highest education level: Not on file   Occupational History    Not on file   Tobacco Use    Smoking status: Former     Current packs/day: 0.00     Average packs/day: 1 pack/day for 24.2 years (24.2 ttl pk-yrs)     Types: Cigarettes     Start date: 2000     Quit date: 3/22/2024     Years since quittin.7     Passive exposure: Never    Smokeless tobacco: Never    Tobacco comments:     Smoked 0.5-1 ppd; quit in 2024.    Vaping Use    Vaping status: Never Used   Substance and Sexual Activity    Alcohol use: Never     Alcohol/week: 1.0 standard drink of alcohol     Types: 1 Shots of liquor per week     Comment: Every 2or 3months    Drug use: Never    Sexual activity: Not on file   Other Topics Concern    Not on file   Social History Narrative    Not on file     Social Drivers of Health     Financial Resource Strain: Low Risk  (2023)    [facility]    Overall Financial Resource Strain (CARDIA)     Difficulty of Paying Living Expenses: Not hard at all   Food Insecurity: No Food Insecurity (2024)    Nursing - Inadequate Food Risk Classification     Worried About Running Out of Food in the Last Year: Never true     Ran Out of Food in the Last Year: Never true     Ran Out of Food in the Last Year: Never true   Transportation Needs: No Transportation Needs (2024)    Nursing - Transportation Risk Classification     Lack of Transportation: Not on file     Lack of Transportation: No   Physical Activity: Not on file   Stress: No Stress Concern Present (2023)    [facility]    [employer] [place] [employer] -  "Occupational Stress Questionnaire     Feeling of Stress : Not at all   Social Connections: Unknown (2024)    Received from [facility]    Social [employer]     How often do you feel lonely or isolated from those around you? (Adult - for ages 18 years and over): Not on file   Intimate Partner Violence: Unknown (2024)    Nursing IPS     Feels Physically and Emotionally Safe: Not on file     Physically Hurt by Someone: Not on file     Humiliated or Emotionally Abused by Someone: Not on file     Physically Hurt by Someone: No     Hurt or Threatened by Someone: No   Housing Stability: Unknown (2024)    Nursing: Inadequate Housing Risk Classification     Has Housing: Not on file     Worried About Losing Housing: Not on file     Unable to Get Utilities: Not on file     Unable to Pay for Housing in the Last Year: No     Has Housin   Recent Concern: Housing Stability - High Risk (2024)    Housing Stability Vital Sign     Unable to Pay for Housing in the Last Year: No     Number of Times Moved in the Last Year: 2     Homeless in the Last Year: No     Family History   Adopted: Yes   Problem Relation Age of Onset    Depression Mother     Heart disease Father     OCD Daughter      Physical Exam:  Vitals:    25 1314   BP: 92/58   BP Location: Left arm   Patient Position: Sitting   Cuff Size: Large   Pulse: 77   SpO2: 94%   Weight: 101 kg (223 lb 9.6 oz)   Height: 5' 8\" (1.727 m)     Constitutional: NAD, non toxic  Ears/nose/mouth/throat: atraumatic  CV: RRR, nl S1S2, no murmurs/rubs/gallups, no JVD, no HJR  Resp: CTABL  GI: Soft, NTND  MSK: no swollen joints in exposed areas  Extr: No edema, warm LE  Pysche: Normal affect  Neuro: appropriate in conversation  Skin: dry and intact in exposed areas    Labs & Results:  Lab Results   Component Value Date    SODIUM 138 2024    K 3.8 2024     2024    CO2 28 2024    BUN 16 2024    CREATININE 1.05 2024    GLUC 97 " 12/21/2024    CALCIUM 9.3 12/27/2024     Lab Results   Component Value Date    WBC 8.30 12/27/2024    HGB 12.3 12/27/2024    HCT 40.2 12/27/2024    MCV 86 12/27/2024     12/27/2024       Counseling / Coordination of Care  Greater than 50% of total time was spent with the patient and / or family counseling and / or coordination of care.  We discussed diagnoses, most recent studies, tests and any changes in treatment plan.    Thank you for the opportunity to participate in the care of this patient.    Vitor Bell MD  Attending Physician  Advanced Heart Failure and Transplant Cardiology  [facility]

## 2025-01-03 ENCOUNTER — OFFICE VISIT (OUTPATIENT)
Dept: CARDIOLOGY CLINIC | Facility: CLINIC | Age: 65
End: 2025-01-03
Payer: COMMERCIAL

## 2025-01-03 ENCOUNTER — TELEPHONE (OUTPATIENT)
Dept: FAMILY MEDICINE CLINIC | Facility: CLINIC | Age: 65
End: 2025-01-03

## 2025-01-03 VITALS
WEIGHT: 223.6 LBS | HEIGHT: 68 IN | OXYGEN SATURATION: 94 % | HEART RATE: 77 BPM | DIASTOLIC BLOOD PRESSURE: 58 MMHG | BODY MASS INDEX: 33.89 KG/M2 | SYSTOLIC BLOOD PRESSURE: 92 MMHG

## 2025-01-03 DIAGNOSIS — E66.01 OBESITY, MORBID (HCC): ICD-10-CM

## 2025-01-03 DIAGNOSIS — I50.23 ACUTE ON CHRONIC SYSTOLIC (CONGESTIVE) HEART FAILURE (HCC): ICD-10-CM

## 2025-01-03 DIAGNOSIS — I26.99 OTHER PULMONARY EMBOLISM WITHOUT ACUTE COR PULMONALE, UNSPECIFIED CHRONICITY (HCC): ICD-10-CM

## 2025-01-03 DIAGNOSIS — I25.10 CORONARY ARTERY DISEASE INVOLVING NATIVE CORONARY ARTERY OF NATIVE HEART, UNSPECIFIED WHETHER ANGINA PRESENT: ICD-10-CM

## 2025-01-03 DIAGNOSIS — I48.92 ATRIAL FLUTTER, PAROXYSMAL (HCC): ICD-10-CM

## 2025-01-03 DIAGNOSIS — N18.30 TYPE 2 DIABETES MELLITUS WITH STAGE 3 CHRONIC KIDNEY DISEASE, WITHOUT LONG-TERM CURRENT USE OF INSULIN, UNSPECIFIED WHETHER STAGE 3A OR 3B CKD (HCC): ICD-10-CM

## 2025-01-03 DIAGNOSIS — I25.118 CORONARY ARTERY DISEASE OF NATIVE HEART WITH STABLE ANGINA PECTORIS, UNSPECIFIED VESSEL OR LESION TYPE (HCC): ICD-10-CM

## 2025-01-03 DIAGNOSIS — N18.4 CHRONIC KIDNEY DISEASE, STAGE 4 (SEVERE) (HCC): ICD-10-CM

## 2025-01-03 DIAGNOSIS — I50.20 HFREF (HEART FAILURE WITH REDUCED EJECTION FRACTION) (HCC): Primary | ICD-10-CM

## 2025-01-03 DIAGNOSIS — E11.22 TYPE 2 DIABETES MELLITUS WITH STAGE 3 CHRONIC KIDNEY DISEASE, WITHOUT LONG-TERM CURRENT USE OF INSULIN, UNSPECIFIED WHETHER STAGE 3A OR 3B CKD (HCC): ICD-10-CM

## 2025-01-03 PROCEDURE — 99214 OFFICE O/P EST MOD 30 MIN: CPT | Performed by: STUDENT IN AN ORGANIZED HEALTH CARE EDUCATION/TRAINING PROGRAM

## 2025-01-03 RX ORDER — ISOSORBIDE MONONITRATE 30 MG/1
30 TABLET, EXTENDED RELEASE ORAL DAILY
Qty: 30 TABLET | Refills: 17 | Status: SHIPPED | OUTPATIENT
Start: 2025-01-03 | End: 2025-01-07

## 2025-01-03 NOTE — TELEPHONE ENCOUNTER
Patient contacted the office this morning in regards to phone call she received earlier. States that she was advised to start Metformin, she does not want to do this to due all of the medications she is currently on. She will reach out to Ozempic to discuss further if she could pay a different way as her private insurance is denying this.

## 2025-01-03 NOTE — TELEPHONE ENCOUNTER
Spoke to patient she was asking since the Ozempic is taking alittle longer than she wanted it to if we can try Wegovy. I did explain to patient that PA and appeals can take alittle bit to go through usually about a month. She was wondering what do you recommend at this point?

## 2025-01-03 NOTE — TELEPHONE ENCOUNTER
Patient calling in after doing some research on drug interactions. She states that the metformin not be taken together with her Tikosyn and lasix and glimepiride or it will cause adverse reactions. She states this is why she should not start the metformin. Asking if this can be part of her appeal process.

## 2025-01-04 DIAGNOSIS — I25.118 CORONARY ARTERY DISEASE OF NATIVE HEART WITH STABLE ANGINA PECTORIS, UNSPECIFIED VESSEL OR LESION TYPE (HCC): ICD-10-CM

## 2025-01-06 ENCOUNTER — TELEPHONE (OUTPATIENT)
Dept: SLEEP CENTER | Facility: CLINIC | Age: 65
End: 2025-01-06

## 2025-01-06 NOTE — TELEPHONE ENCOUNTER
Patient called office and stated she would like to use [facility] - patient is willing to try cpap - Dr. Caceres / Dr. Sanchez - are you willing to order.

## 2025-01-07 ENCOUNTER — TELEPHONE (OUTPATIENT)
Dept: FAMILY MEDICINE CLINIC | Facility: CLINIC | Age: 65
End: 2025-01-07

## 2025-01-07 ENCOUNTER — TELEPHONE (OUTPATIENT)
Dept: CARDIOLOGY CLINIC | Facility: CLINIC | Age: 65
End: 2025-01-07

## 2025-01-07 ENCOUNTER — TELEPHONE (OUTPATIENT)
Age: 65
End: 2025-01-07

## 2025-01-07 DIAGNOSIS — E66.09 CLASS 1 OBESITY DUE TO EXCESS CALORIES WITH SERIOUS COMORBIDITY AND BODY MASS INDEX (BMI) OF 34.0 TO 34.9 IN ADULT: ICD-10-CM

## 2025-01-07 DIAGNOSIS — E11.9 TYPE 2 DIABETES MELLITUS WITHOUT COMPLICATION, WITHOUT LONG-TERM CURRENT USE OF INSULIN (HCC): ICD-10-CM

## 2025-01-07 DIAGNOSIS — E66.811 CLASS 1 OBESITY DUE TO EXCESS CALORIES WITH SERIOUS COMORBIDITY AND BODY MASS INDEX (BMI) OF 34.0 TO 34.9 IN ADULT: ICD-10-CM

## 2025-01-07 DIAGNOSIS — I25.10 CORONARY ARTERY DISEASE INVOLVING NATIVE CORONARY ARTERY OF NATIVE HEART WITHOUT ANGINA PECTORIS: Chronic | ICD-10-CM

## 2025-01-07 DIAGNOSIS — G47.33 OSA (OBSTRUCTIVE SLEEP APNEA): ICD-10-CM

## 2025-01-07 DIAGNOSIS — I25.118 CORONARY ARTERY DISEASE OF NATIVE HEART WITH STABLE ANGINA PECTORIS, UNSPECIFIED VESSEL OR LESION TYPE (HCC): ICD-10-CM

## 2025-01-07 DIAGNOSIS — E78.2 MIXED HYPERLIPIDEMIA: ICD-10-CM

## 2025-01-07 RX ORDER — ISOSORBIDE MONONITRATE 30 MG/1
30 TABLET, EXTENDED RELEASE ORAL DAILY
Qty: 30 TABLET | Refills: 0 | OUTPATIENT
Start: 2025-01-07 | End: 2026-07-01

## 2025-01-07 RX ORDER — ISOSORBIDE MONONITRATE 30 MG/1
30 TABLET, EXTENDED RELEASE ORAL 2 TIMES DAILY
Qty: 60 TABLET | Refills: 17 | Status: SHIPPED | OUTPATIENT
Start: 2025-01-07 | End: 2026-07-01

## 2025-01-07 NOTE — TELEPHONE ENCOUNTER
PA for Isosoribide mononitrate 30 mg SUBMITTED to [employer]     via    []CMM-KEY:   [x]Surescripts-Case ID #   []Availity-Auth ID # NDC #   []Faxed to plan   []Other website   []Phone call Case ID #     []PA sent as URGENT    All office notes, labs and other pertaining documents and studies sent. Clinical questions answered. Awaiting determination from insurance company.     Turnaround time for your insurance to make a decision on your Prior Authorization can take 7-21 business days.

## 2025-01-07 NOTE — TELEPHONE ENCOUNTER
Spoke to patient in regards to Ozempic and it looks like as of the 3 of January it was in a pending phase. I told patient I will reach out to PA team to confirm. Patient did state if it goes through then she would like it sent to [facility].

## 2025-01-07 NOTE — TELEPHONE ENCOUNTER
Please resend script to pharmacy it has conflicting instructions in the sig.  It states to take 30 mg daily then also says to take 30 mg in the am and 30 mg in the pm.  You must get rid of the 30 mg daily if the patient is to take it twice a day

## 2025-01-07 NOTE — TELEPHONE ENCOUNTER
PA for Isosorbide mononitrate  NOT REQUIRED     Reason (screenshot if applicable)          Patient advised by          [] MyChart Message  [] Phone call   []LMOM  []L/M to call office as no active Communication consent on file  []Unable to leave detailed message as VM not approved on Communication consent       Pharmacy advised by    [x]Fax  []Phone call

## 2025-01-07 NOTE — TELEPHONE ENCOUNTER
Attempted to reach Vesna to confirm the reason for the visit today with Dr. Montoya. Patient self scheduled her self for a Physical but patient isnt due for a physical until 11/16/2025. If patient calls back please confirm the reason for visit, if theres no concerns please remove patient from the schedule.

## 2025-01-08 NOTE — TELEPHONE ENCOUNTER
Patient aware and would like to know when her medication is sent to the pharmacy. Please call patient with an update.

## 2025-01-08 NOTE — TELEPHONE ENCOUNTER
Attempted to reach Vesna to let her know that her PA was approved and her medication will be sent to her pharmacy. Patient didn't answer/ left voicemail.

## 2025-01-09 ENCOUNTER — CLINICAL SUPPORT (OUTPATIENT)
Dept: NUTRITION | Facility: CLINIC | Age: 65
End: 2025-01-09
Payer: COMMERCIAL

## 2025-01-09 VITALS — BODY MASS INDEX: 34.88 KG/M2 | WEIGHT: 229.4 LBS

## 2025-01-09 DIAGNOSIS — I25.5 ISCHEMIC CARDIOMYOPATHY: Primary | ICD-10-CM

## 2025-01-09 PROCEDURE — 97803 MED NUTRITION INDIV SUBSEQ: CPT | Performed by: DIETITIAN, REGISTERED

## 2025-01-09 NOTE — PROGRESS NOTES
" Nutrition Assessment Form    Patient Name: Vesna Langston    YOB: 1960    Sex: Female     Assessment Date: 1/9/2025  Start Time: 9:30 AM Stop Time: 10:02 AM Total Minutes: 32 min       Data:  Present at session: self   Parent/Patient Concerns/reason for visit: \"My waist decreased by an inch! And I feel good now.\"    Medical Dx/Reason for Referral: Ischemic cardiomyopathy   Past Medical History:   Diagnosis Date    Acute respiratory insufficiency 03/22/2024    Allergic     Chronic HFrEF (heart failure with reduced ejection fraction) ([facility])     Coronary artery disease     COVID-19 virus infection 11/28/2022    Diabetes mellitus ([facility])     Disease of thyroid gland 4/09/2024    Heart disease 3/24    Hyperlipidemia     Hypoglycemia     ICD (implantable cardioverter-defibrillator) in place     Ischemic cardiomyopathy     Lactic acidosis 03/22/2024    Myocardial infarction ([facility]) 3/22/2024    Otitis media 1966    Seasonal allergies     ST elevation myocardial infarction involving left anterior descending (LAD) coronary artery ([facility]) 03/22/2024    Tobacco abuse     Visual impairment 1967    Back surgery in March  MI in March 2024  Only able to work 4 h per day at work, on workmans comp due to back surgery, not able to lift more than 1 gal milk    ECHO scheduled for next week to assess HF    Gastric ulcers   Current Outpatient Medications   Medication Sig Dispense Refill    albuterol (ProAir HFA) 90 mcg/act inhaler Inhale 2 puffs every 6 (six) hours as needed for wheezing 8.5 g 0    apixaban (ELIQUIS) 5 mg Take 1 tablet (5 mg total) by mouth 2 (two) times a day 180 tablet 1    atorvastatin (LIPITOR) 80 mg tablet TAKE 1 TABLET BY MOUTH EVERY EVENING 100 tablet 1    clopidogrel (PLAVIX) 75 mg tablet Take 1 tablet (75 mg total) by mouth daily 90 tablet 3    diphenhydrAMINE (BENADRYL) 25 mg capsule Take 25 mg by mouth every 6 (six) hours as needed for itching      dofetilide (TIKOSYN) 125 mcg capsule Take 1 capsule (125 " mcg total) by mouth every 12 (twelve) hours 180 capsule 3    Empagliflozin (Jardiance) 25 MG TABS Take 1 tablet (25 mg total) by mouth every morning 30 tablet 17    fluticasone (FLONASE) 50 mcg/act nasal spray 1 spray into each nostril daily 9.9 mL 0    furosemide (LASIX) 40 mg tablet Take 1 tablet (40 mg total) by mouth 2 (two) times a day 60 tablet 17    gabapentin (Neurontin) 100 mg capsule Take 1 capsule (100 mg total) by mouth 2 (two) times a day      glimepiride (AMARYL) 1 mg tablet Take 1 tablet (1 mg total) by mouth daily with breakfast 90 tablet 3    HYDROcodone-acetaminophen (NORCO) 5-325 mg per tablet Take 1 tablet by mouth every 6 (six) hours as needed for pain for up to 10 doses Max Daily Amount: 4 tablets 10 tablet 0    isosorbide mononitrate (IMDUR) 30 mg 24 hr tablet Take 1 tablet (30 mg total) by mouth 2 (two) times a day 30 mg in the am and 30 mg in the pm 60 tablet 17    levothyroxine (Synthroid) 75 mcg tablet Take 1 tablet (75 mcg total) by mouth daily 90 tablet 3    meclizine (ANTIVERT) 25 mg tablet Take 1 tablet (25 mg total) by mouth every 8 (eight) hours as needed for dizziness 30 tablet 0    metoprolol succinate (TOPROL-XL) 25 mg 24 hr tablet Take 2 tablets (50 mg total) by mouth daily at bedtime 180 tablet 5    nitroglycerin (NITROSTAT) 0.4 mg SL tablet Place 1 tablet (0.4 mg total) under the tongue every 5 (five) minutes as needed for chest pain 30 tablet 0    pantoprazole (PROTONIX) 40 mg tablet TAKE 1 TABLET BY MOUTH 2 TIMES A DAY BEFORE MEALS. 180 tablet 1    potassium chloride (Klor-Con M20) 20 mEq tablet TAKE 1 TABLET BY MOUTH EVERY DAY 90 tablet 1    sacubitril-valsartan (Entresto) 49-51 MG TABS Take 1 tablet by mouth 2 (two) times a day 60 tablet 17    semaglutide, 0.25 or 0.5 mg/dose, (Ozempic, 0.25 or 0.5 MG/DOSE,) 2 mg/3 mL injection pen Inject 0.375 mL (0.25 mg total) under the skin every 7 days for 28 days, THEN 0.75 mL (0.5 mg total) every 7 days for 28 days. 9 mL 0    [START  "ON 2/17/2025] semaglutide, 1 mg/dose, (Ozempic) 4 mg/3 mL injection pen Inject 0.75 mL (1 mg total) under the skin once a week for 28 days Do not start before February 17, 2025. (Patient not taking: Reported on 1/3/2025 Do not start before February 17, 2025.) 3 mL 0    [START ON 3/17/2025] semaglutide, 2 mg/dose, (Ozempic) 8 mg/ mL injection pen Inject 0.75 mL (2 mg total) under the skin every 7 days Do not start before March 17, 2025. (Patient not taking: Reported on 1/3/2025 Do not start before March 17, 2025.) 9 mL 1     No current facility-administered medications for this visit.     \"My blood sugar is more acceptable now on the amaryl\" Previously in 140-150s now in 110-120s range.     Ozempic appeal in progress--pt reports started Ozempic started yesterday    Additional Meds/Supplements: coQ10, stopped MVI due to stomach upset, avoids omega 3 supplements due to seafood allergy    Special Learning Needs/barriers to learning/any new barriers N/a   Height: Ht Readings from Last 5 Encounters:   01/03/25 5' 8\" (1.727 m)   12/24/24 5' 8\" (1.727 m)   12/13/24 5' 8\" (1.727 m)   12/03/24 5' 8\" (1.727 m)   11/22/24 5' 8\" (1.727 m)      Weight: Wt Readings from Last 10 Encounters:   01/03/25 101 kg (223 lb 9.6 oz)   12/24/24 101 kg (223 lb 12.3 oz)   12/21/24 107 kg (235 lb 7.2 oz)   12/13/24 100 kg (220 lb 14.4 oz)   12/03/24 101 kg (222 lb 14.2 oz)   12/02/24 99.8 kg (220 lb 1.6 oz)   11/30/24 104 kg (228 lb 2.8 oz)   11/22/24 99.8 kg (220 lb 0.3 oz)   11/20/24 99.8 kg (220 lb)   11/15/24 103 kg (226 lb 3.1 oz)     Estimated body mass index is 34 kg/m² as calculated from the following:    Height as of 1/3/25: 5' 8\" (1.727 m).    Weight as of 1/3/25: 101 kg (223 lb 9.6 oz).   Recent Weight Change: [x]Yes     []No  Amount: Noticed weight gain with increase in amount of medications, ~15lb.       Energy Needs: [place]- St. Jeor Equation: 1536 - 1849 kcal (mifflin x 1.3 - 1.5 activity - 500 for 1 lb weight loss per " "week)  173 - 208 g CHO per day (45% kcal from CHO)  76 - 95 g PRO (1.2 - 1.5 g/kg IBW)   Allergies   Allergen Reactions    Fish-Derived Products - Food Allergy Anaphylaxis     Cannot have all seafood products    Shellfish-Derived Products - Food Allergy Anaphylaxis    Azithromycin Hives and Rash    or intolerances Allergy to shellfish/fish causing anaphylaxis    Does not like liver or beets, can't tolerate spicy foods   Social History     Substance and Sexual Activity   Alcohol Use Never    Alcohol/week: 1.0 standard drink of alcohol    Types: 1 Shots of liquor per week    Comment: Every 2or 3months    Very rare, last drink was at [facility]    Social History     Tobacco Use   Smoking Status Former    Current packs/day: 0.00    Average packs/day: 1 pack/day for 24.2 years (24.2 ttl pk-yrs)    Types: Cigarettes    Start date: 2000    Quit date: 3/22/2024    Years since quittin.8    Passive exposure: Never   Smokeless Tobacco Never   Tobacco Comments    Smoked 0.5-1 ppd; quit in 2024.     Quit smoking 6 mo ago cold turkey   Who shops? patient and daughter sometimes   Who cooks/cooking methods/Eating out/take out habits   patient  Cooking methods: bake/connor/air connor/grill/boil---mostly air fried or baked or broiled or shallow connor eggs in spray oil     Take out: chick benson grilled chicken wrap if she does go out    Dining out tries to portion control if she goes out such as on vacations, limited    Exercise: Aquatherapy \"for my back\"     Finished cardiac rehab, joined [employer] to continue with her physical activity regimen. Plans to go there 3x per week.          Other: ie: Sleep habits/ stress level/ work habits household-lives with ?/ food security Did not assess at this visit   Prior Nutritional Counseling? [x]Yes --during hospitalization for heart attack, low sodium, low fat, low cholesterol    []No  When:      Why:         Diet Hx:  Breakfast: Diet: 64 oz fluid restriction, trying to reduce carbs " "and sodium  Wakes up at 2 AM (works at 4 AM)    Eats cereal \"fills the bowl\" or biscuits with scrambled eggs with peppers/onions/cheese baked in muffin cups as a premade egg sandwich or yogurt with fruit such as regular Greek yogurt or Jason English muffin cinnamon raisin or cranberry with margarine + apples or blackberries or banana    Coffee 8 oz less with zero sugar creamer     Lunch: At 11 AM  Grilled cheese with tomato or ham/cheese sandwich or ham/cheese wrap and fresh fruit tries to get low sodium lunchmeat with rosey or swiss cheese    Uses 647 bread and carb smart tortillas     If on the go and doesn't have time would have [employer]onal[employer] though very rare this happens     Dinner: Meat/veg/whole wheat pasta or brown rice or baked sweet potato or sweet potato fries in air fryer    Or frozen chicken cordon blue with rice and carrots    Or may have cottage cheese with fruit or salad      Snacks: AM - at 6/6:30 such as baked egg with slice of ham  PM - would like to try slim fast shakes for this  HS - fun size snicker bar or 90 kcal popcorners or low fat cheetos, goes to bed at 7 AM     BodyArmor Light, 1 per day using this has her potassium supplement     \"I splurged\" and had a diet rootbeer, avoids regular soda    Other Notes/ Initial Assessment:  Pt reports previous low calorie diet in effort to lose weight though was advised by physician to see dietitian for dietary guidance. Pt is knowledgeable of reading foods labels to identify sodium, fat, cholesterol content, unsure of appropriate carb intake for meals.      Updated assessment (Follow up note only):  Pt has gained 13lb from previous appointment 1 mo ago. Pt noticing her legs feel puffy and heavy, believes this is fluid related weight gain. Pt admitting she has been busier lately and selecting convenience foods such as [employer]onal[employer] and frozen convenience food options. Pt continues to restrict fluid intake to 64 oz per day, mindful of watermelon being part of " this though was not counting yogurts which she eats daily. Pt reports her BG levels have been great, this morning was 104.     1/9/25: Pt reports not noticing weight loss on the scale though noticed waist circumference decrease, says has someone at home helping her measure this. Pt has been going to the gym and completing physical activity as directed by her physician. Recently started Ozempic and inquiring about appropriate protein intake.          Nutrition Diagnosis:   Altered Nutrition-Related Laboratory values  related to Kidney, liver, cardiac, endocrine, neurologic, and/or pulmonary dysfunction as evidenced by  Abnormal plasma glucose and/or HgbA1c levels + abnormal TRIG/LDLs       Any change or new dx since previous visit:       Medical Nutrition Therapy Intervention:  [x]Individualized Meal Plan--Discussed lean sources of PRO to include regularly such as poultry without skin, lean beef/pork, venison, beans/lentils, non-fat dairy, nuts/nut butters etc. Discussed appropriate PRO intake of 76-95 g per day and approx protein content of recommended PRO foods.  []Understanding Lab Values   []Basic Pathophysiology of Disease []Food/Medication Interactions   [x]Food Diary--Pt reports she has been tracking calories, 1500 kcal per day is her goal. Would encourage continuation of this.  [x]Exercise--Exercise regimen as directed by physician   []Lifestyle/Behavior Modification Techniques []Medication, Mechanism of Action   []Label Reading:  []Self Blood Glucose Monitoring   [x]Weight/BMI Goals:Goal of 0.5-1lb weight loss per week.  []Other -     Handouts provided today: heart healthy nutrition therapy, specifically reviewed PRO foods in this    Handouts previously provided: Portions Booklet, homemade banana nice cream recipe          Comprehension: []Excellent  []Very Good  [x]Good  []Fair   []Poor    Receptivity: []Excellent  []Very Good  [x]Good  []Fair   []Poor    Expected Compliance: []Excellent  []Very  "Good  [x]Good  []Fair   []Poor        Goals (initial)/ Progress made on previous goals/new goals:  Pt to lose 0.5-1lb per week upon follow up. --not met, extended   2. Pt to obtain A1c < 7% upon next lab reading. --not met, extended   3. Pt to limit snacks to 15 grams carb each upon follow up. --met, extended       No follow-ups on file.  Labs:  CMP  Lab Results   Component Value Date    K 3.8 12/27/2024     12/27/2024    CO2 28 12/27/2024    BUN 16 12/27/2024    CREATININE 1.05 12/27/2024    GLUCOSE 168 (H) 04/07/2024    GLUF 117 (H) 12/27/2024    CALCIUM 9.3 12/27/2024    CORRECTEDCA 9.0 04/07/2024    AST 20 11/30/2024    ALT 19 11/30/2024    ALKPHOS 47 11/30/2024    EGFR 55 12/27/2024       BMP  Lab Results   Component Value Date    GLUCOSE 168 (H) 04/07/2024    CALCIUM 9.3 12/27/2024    K 3.8 12/27/2024    CO2 28 12/27/2024     12/27/2024    BUN 16 12/27/2024    CREATININE 1.05 12/27/2024       Lipids  No results found for: \"CHOL\"  Lab Results   Component Value Date    HDL 42 (L) 12/27/2024    HDL 57 09/27/2024    HDL 45 (L) 04/22/2024     Lab Results   Component Value Date    LDLCALC 88 12/27/2024    LDLCALC 103 (H) 09/27/2024    LDLCALC 70 04/22/2024     Lab Results   Component Value Date    TRIG 218 (H) 12/27/2024    TRIG 168 (H) 09/27/2024    TRIG 137 04/22/2024     No results found for: \"CHOLHDL\"    Hemoglobin A1C  Lab Results   Component Value Date    HGBA1C 7.1 (H) 12/27/2024       Fasting Glucose  Lab Results   Component Value Date    GLUF 117 (H) 12/27/2024       Insulin     Thyroid  No results found for: \"TSH\", \"A2SWHBA\", \"R2FSCVX\", \"THYROIDAB\"    Hepatic Function Panel  Lab Results   Component Value Date    ALT 19 11/30/2024    AST 20 11/30/2024    ALKPHOS 47 11/30/2024       Celiac Disease Antibody Panel  No results found for: \"ENDOMYSIAL IGA\", \"GLIADIN IGA\", \"GLIADIN IGG\", \"IGA\", \"TISSUE TRANSGLUT AB\", \"TTG IGA\"   Iron  Lab Results   Component Value Date    IRON 68 04/07/2024    TIBC 307 " 04/07/2024    FERRITIN 59 04/07/2024            Audrey Peters RD, LDN  [facility]  [address] [place] [address] [address] [place] [phone]

## 2025-01-10 LAB

## 2025-01-10 NOTE — TELEPHONE ENCOUNTER
Doctors, please disregard.  CPAP Rx is on chart. Written by Dr. Caceres on 10/1/24.     Rx for CPAP and clincals sent to [facility]) via [facility]. Noted on order that patient wants set up in the [employer].

## 2025-01-13 ENCOUNTER — TELEPHONE (OUTPATIENT)
Facility: MEDICAL CENTER | Age: 65
End: 2025-01-13

## 2025-01-13 ENCOUNTER — TELEPHONE (OUTPATIENT)
Dept: OBGYN CLINIC | Facility: HOSPITAL | Age: 65
End: 2025-01-13

## 2025-01-13 ENCOUNTER — OFFICE VISIT (OUTPATIENT)
Dept: OBGYN CLINIC | Facility: HOSPITAL | Age: 65
End: 2025-01-13
Payer: OTHER MISCELLANEOUS

## 2025-01-13 VITALS — WEIGHT: 229.28 LBS | BODY MASS INDEX: 34.75 KG/M2 | HEIGHT: 68 IN

## 2025-01-13 DIAGNOSIS — M54.9 BACK PAIN, UNSPECIFIED BACK LOCATION, UNSPECIFIED BACK PAIN LATERALITY, UNSPECIFIED CHRONICITY: ICD-10-CM

## 2025-01-13 DIAGNOSIS — M54.16 LUMBAR RADICULOPATHY: ICD-10-CM

## 2025-01-13 DIAGNOSIS — R52 PAIN: Primary | ICD-10-CM

## 2025-01-13 DIAGNOSIS — S32.009K PSEUDOARTHROSIS OF LUMBAR SPINE: ICD-10-CM

## 2025-01-13 LAB

## 2025-01-13 PROCEDURE — 99244 OFF/OP CNSLTJ NEW/EST MOD 40: CPT | Performed by: ORTHOPAEDIC SURGERY

## 2025-01-13 NOTE — PROGRESS NOTES
Assessment & Plan/Medical Decision Makin y.o. female with Back Pain, Left Radicular Leg Pain, and Ambulatory Dysfunction and imaging findings most notable for lumbar spondylosis, L3-S1 fusion construct with evidence of pseudoarthrosis          The clinical, physical and imaging findings were reviewed with the patient.  Vesna  has a constellation of findings consistent with Lumbar Myofascial Pain, Lumbar Radiculopathy, and Ambulatory Dysfunction in the setting of lumbar degenerative disease, history of workplace injury that is post L3-S1 fusion with evidence of pseudoarthrosis.      Fortunately patient does not remain neurologically intact and functional. Physical exam showing weakness of the left lower extremity and ambulatory dysfunction.  We discussed the treatment options including physical therapy, at home exercises, activity modifications, chiropractic medicine, oral medications, interventional spine procedures.  At this time recommend surgical intervention due to ongoing symptoms related to pseudoarthrosis.  Metabolic workup was initiated along with updated lumbar MRI.    Patient instructed to return to office/ER sooner if symptoms are not improving, getting worse, or new worrisome/neurologic symptoms arise.  Patient will follow up when MRI and lab results are in for re-evaluation after further conservative treatments.       Subjective:      Chief Complaint: Back Pain    HPI:  Vesna Langston is a 64 y.o. female presenting for initial visit with chief complaint of low back pain - referred by Dr. Pete. This is a second opinion requested by workers comp.  Pain began 2 years ago after an injury at work. She received a fusion of L3-S1 in .  2 weeks after she return to work she suffered a twisting injury which caused her to return to her surgeon.  X-ray revealed loosening of multiple screws in her lumbar spine.  She was offered an SI injection to help with low back pain. 5 hours after the injection she  suffered a heart attack in which a she received an ICD and stent. Today, she claims that she cannot feel her left leg. She attempts to walk on a treadmill for her cardiac rehab in which her left leg will give out 10-15 times during her hour long rehab. She reports that she is currently in physical therapy that is providing no relief.  Describes pain as crushing in nature.  Located in low back.  + numbness . + burning.    Denies fever or chills, no night sweats. Denies any bladder or bowel changes.      Conservative therapy includes the following:   Medications: Tylenol arthritis, gabapentin 2x per day. Norco as needed.    Injections: Yes SI joint 2 years ago     Physical Therapy: Yes  Chiropractic Medicine: has not attempted  Accupunture/Massage Therapy: has not attempted   These therapeutic modalities were ineffective at providing sustained pain relief/functional improvement.     Nicotine dependent: no  Occupation: disabled  Living situation: Lives with family   ADLs: patient is unable to perform ADL's such as shopping, cleaning, driving, etc     Objective:     Family History   Adopted: Yes   Problem Relation Age of Onset    Depression Mother     Heart disease Father     OCD Daughter        Past Medical History:   Diagnosis Date    Acute respiratory insufficiency 03/22/2024    Allergic     Chronic HFrEF (heart failure with reduced ejection fraction) ([facility])     Coronary artery disease     COVID-19 virus infection 11/28/2022    Diabetes mellitus ([facility])     Disease of thyroid gland 4/09/2024    Heart disease 3/24    Hyperlipidemia     Hypoglycemia     ICD (implantable cardioverter-defibrillator) in place     Ischemic cardiomyopathy     Lactic acidosis 03/22/2024    Myocardial infarction ([facility]) 3/22/2024    Otitis media 1966    Seasonal allergies     ST elevation myocardial infarction involving left anterior descending (LAD) coronary artery ([facility]) 03/22/2024    Tobacco abuse     Visual impairment 1967       Current  Outpatient Medications   Medication Sig Dispense Refill    albuterol (ProAir HFA) 90 mcg/act inhaler Inhale 2 puffs every 6 (six) hours as needed for wheezing 8.5 g 0    apixaban (ELIQUIS) 5 mg Take 1 tablet (5 mg total) by mouth 2 (two) times a day 180 tablet 1    atorvastatin (LIPITOR) 80 mg tablet TAKE 1 TABLET BY MOUTH EVERY EVENING 100 tablet 1    clopidogrel (PLAVIX) 75 mg tablet Take 1 tablet (75 mg total) by mouth daily 90 tablet 3    diphenhydrAMINE (BENADRYL) 25 mg capsule Take 25 mg by mouth every 6 (six) hours as needed for itching      dofetilide (TIKOSYN) 125 mcg capsule Take 1 capsule (125 mcg total) by mouth every 12 (twelve) hours 180 capsule 3    Empagliflozin (Jardiance) 25 MG TABS Take 1 tablet (25 mg total) by mouth every morning 30 tablet 17    fluticasone (FLONASE) 50 mcg/act nasal spray 1 spray into each nostril daily 9.9 mL 0    furosemide (LASIX) 40 mg tablet Take 1 tablet (40 mg total) by mouth 2 (two) times a day 60 tablet 17    gabapentin (Neurontin) 100 mg capsule Take 1 capsule (100 mg total) by mouth 2 (two) times a day      glimepiride (AMARYL) 1 mg tablet Take 1 tablet (1 mg total) by mouth daily with breakfast 90 tablet 3    HYDROcodone-acetaminophen (NORCO) 5-325 mg per tablet Take 1 tablet by mouth every 6 (six) hours as needed for pain for up to 10 doses Max Daily Amount: 4 tablets 10 tablet 0    isosorbide mononitrate (IMDUR) 30 mg 24 hr tablet Take 1 tablet (30 mg total) by mouth 2 (two) times a day 30 mg in the am and 30 mg in the pm 60 tablet 17    levothyroxine (Synthroid) 75 mcg tablet Take 1 tablet (75 mcg total) by mouth daily 90 tablet 3    meclizine (ANTIVERT) 25 mg tablet Take 1 tablet (25 mg total) by mouth every 8 (eight) hours as needed for dizziness 30 tablet 0    metoprolol succinate (TOPROL-XL) 25 mg 24 hr tablet Take 2 tablets (50 mg total) by mouth daily at bedtime 180 tablet 5    nitroglycerin (NITROSTAT) 0.4 mg SL tablet Place 1 tablet (0.4 mg total) under  the tongue every 5 (five) minutes as needed for chest pain 30 tablet 0    pantoprazole (PROTONIX) 40 mg tablet TAKE 1 TABLET BY MOUTH 2 TIMES A DAY BEFORE MEALS. 180 tablet 1    potassium chloride (Klor-Con M20) 20 mEq tablet TAKE 1 TABLET BY MOUTH EVERY DAY 90 tablet 1    sacubitril-valsartan (Entresto) 49-51 MG TABS Take 1 tablet by mouth 2 (two) times a day 60 tablet 17    semaglutide, 0.25 or 0.5 mg/dose, (Ozempic, 0.25 or 0.5 MG/DOSE,) 2 mg/3 mL injection pen Inject 0.375 mL (0.25 mg total) under the skin every 7 days for 28 days, THEN 0.75 mL (0.5 mg total) every 7 days for 28 days. 9 mL 0    [START ON 2/17/2025] semaglutide, 1 mg/dose, (Ozempic) 4 mg/3 mL injection pen Inject 0.75 mL (1 mg total) under the skin once a week for 28 days Do not start before February 17, 2025. (Patient not taking: Reported on 1/3/2025 Do not start before February 17, 2025.) 3 mL 0    [START ON 3/17/2025] semaglutide, 2 mg/dose, (Ozempic) 8 mg/ mL injection pen Inject 0.75 mL (2 mg total) under the skin every 7 days Do not start before March 17, 2025. (Patient not taking: Reported on 1/3/2025 Do not start before March 17, 2025.) 9 mL 1     No current facility-administered medications for this visit.       Past Surgical History:   Procedure Laterality Date    ADENOIDECTOMY      APPENDECTOMY      APPENDECTOMY LAPAROSCOPIC N/A 05/08/2024    Procedure: APPENDECTOMY LAPAROSCOPIC;  Surgeon: Lauryn Ullrich, DO;  [facility]: [facility];  Service: General    BACK SURGERY  8/23    BREAST EXCISIONAL BIOPSY Right 09/2000    cyst removed- benign    CARDIAC CATHETERIZATION N/A 03/22/2024    Procedure: Cardiac pci;  Surgeon: Gabriel Myers MD;  [facility]: [facility];  Service: Cardiology    CARDIAC CATHETERIZATION N/A 03/22/2024    Procedure: Cardiac Coronary Angiogram;  Surgeon: Gabriel Myers MD;  [facility];  Service: Cardiology    CARDIAC CATHETERIZATION N/A 03/22/2024    Procedure: Cardiac PCI AMI;  Surgeon: Gabriel  MD Louis;  [facility];  Service: Cardiology    CARDIAC CATHETERIZATION N/A 2024    Procedure: Cardiac IVUS;  Surgeon: Gabriel Myers MD;  [facility];  Service: Cardiology    CARDIAC DEFIBRILLATOR PLACEMENT      CARDIAC ELECTROPHYSIOLOGY PROCEDURE N/A 2024    Procedure: Cardiac icd implant;  Surgeon: Jeffy Lama MD;  [facility];  Service: Cardiology     SECTION      COLONOSCOPY      ECTOPIC PREGNANCY SURGERY      INSERT / REPLACE / REMOVE PACEMAKER      MASS EXCISION Left 10/18/2024    Procedure: EXCISION BIOPSY TISSUE LESION/MASS UPPER EXTREMITY - Left index finger;  Surgeon: Leandro Campos MD;  Location:  MAIN OR;  Service: Orthopedics    SEPTOPLASTY      SPINE SURGERY  23    TONSILLECTOMY         Social History     Socioeconomic History    Marital status:      Spouse name: Not on file    Number of children: 3    Years of education: Not on file    Highest education level: Not on file   Occupational History    Not on file   Tobacco Use    Smoking status: Former     Current packs/day: 0.00     Average packs/day: 1 pack/day for 24.2 years (24.2 ttl pk-yrs)     Types: Cigarettes     Start date: 2000     Quit date: 3/22/2024     Years since quittin.8     Passive exposure: Never    Smokeless tobacco: Never    Tobacco comments:     Smoked 0.5-1 ppd; quit in 2024.    Vaping Use    Vaping status: Never Used   Substance and Sexual Activity    Alcohol use: Never     Alcohol/week: 1.0 standard drink of alcohol     Types: 1 Shots of liquor per week     Comment: Every 2or 3months    Drug use: Never    Sexual activity: Not on file   Other Topics Concern    Not on file   Social History Narrative    Not on file     Social Drivers of Health     Financial Resource Strain: Low Risk  (2023)    [facility]    Overall Financial Resource Strain (CARDIA)     Difficulty of  Paying Living Expenses: Not hard at all   Food Insecurity: No Food Insecurity (2024)    Nursing - Inadequate Food Risk Classification     Worried About Running Out of Food in the Last Year: Never true     Ran Out of Food in the Last Year: Never true     Ran Out of Food in the Last Year: Never true   Transportation Needs: No Transportation Needs (2024)    Nursing - Transportation Risk Classification     Lack of Transportation: Not on file     Lack of Transportation: No   Physical Activity: Not on file   Stress: No Stress Concern Present (2023)    Received from [facility], [facility] - Occupational Stress Questionnaire     Feeling of Stress : Not at all   Social Connections: Unknown (2024)    Received from [facility]     How often do you feel lonely or isolated from those around you? (Adult - for ages 18 years and over): Not on file   Intimate Partner Violence: Unknown (2024)    Nursing IPS     Feels Physically and Emotionally Safe: Not on file     Physically Hurt by Someone: Not on file     Humiliated or Emotionally Abused by Someone: Not on file     Physically Hurt by Someone: No     Hurt or Threatened by Someone: No   Housing Stability: Unknown (2024)    Nursing: Inadequate Housing Risk Classification     Has Housing: Not on file     Worried About Losing Housing: Not on file     Unable to Get Utilities: Not on file     Unable to Pay for Housing in the Last Year: No     Has Housin   Recent Concern: Housing Stability - High Risk (2024)    Housing Stability Vital Sign     Unable to Pay for Housing in the Last Year: No     Number of Times Moved in the Last Year: 2     Homeless in the Last Year: No       Allergies   Allergen Reactions    Fish-Derived Products - Food Allergy Anaphylaxis     Cannot have all seafood products    Shellfish-Derived Products - Food Allergy Anaphylaxis  "   Azithromycin Hives and Rash       Review of Systems  General- denies fever/chills  HEENT- denies hearing loss or sore throat  Eyes- denies eye pain or visual disturbances, denies red eyes  Respiratory- denies cough or SOB  Cardio- denies chest pain or palpitations  GI- denies abdominal pain  Endocrine- denies urinary frequency  Urinary- denies pain with urination  Musculoskeletal- Negative except noted above  Skin- denies rashes or wounds  Neurological- denies dizziness or headache  Psychiatric- denies anxiety or difficulty concentrating    Physical Exam  Ht 5' 8\" (1.727 m)   Wt 104 kg (229 lb 4.5 oz)   BMI 34.86 kg/m²     General/Constitutional: No apparent distress: well-nourished and well developed.  Lymphatic: No appreciable lymphadenopathy  Respiratory: Non-labored breathing  Vascular: No edema, swelling or tenderness, except as noted in detailed exam.  Integumentary: No impressive skin lesions present, except as noted in detailed exam.  Psych: Normal mood and affect, oriented to person, place and time.  MSK: normal other than stated in HPI and exam  Gait & balance: no evidence of myelopathic gait, ambulates with a Cane    Lumbar spine range of motion:  -Forward flexion to 45  -Extension to neutral  -Lateral bend 15 right, 15 left  -Rotation 5 right, 5 left  There is tenderness with palpation along lumbar paraspinal musculature, no midline tenderness , well-healed surgical sites    Neurologic:    Lower Extremity Motor Function    Right  Left    Iliopsoas  5/5  4/5    Quadriceps 5/5 4/5   Tibialis anterior  5/5  4/5    EHL  5/5  3+/5    Gastroc. muscle  5/5  2/5    Heel rise  5/5  2/5    Toe rise  5/5  2/5      Sensory: light touch is intact to bilateral upper and lower extremities     Reflexes: Intact/diminished    Other tests:  Straight Leg Raise: positive  Jazmyn SI: positive  MAGNO SI: negative  Greater troch: no tenderness   Internal/external hip ROM: intact, no pain   Flexion/extension knee ROM: " "intact, no pain   Vascular: WWP extremities    Diagnostic Tests   IMAGING: I have personally reviewed the images and these are my findings:  Lumbar Spine X-rays from 8/20/24: multi level lumbar spondylosis, L3-S1 bilateral pedicle screw and aliya construct with obvious loosening of the L3 screws, the right L4 screw is misplaced and not within the pedicle, L4-5 spondylolisthesis    Lumbar Spine MRI from 3/18/2024: L3-S1 fusion construct with no significant central or foraminal stenosis appreciated    CT abdomen and pelvis from 5/7/2024: L3-S1 fusion construct with gross loosening of the L3 screws with halo appearance around bilateral screws, L4 screws misplaced and not within the pedicle, the bilateral L5 screws appear to have lateral cortex violation, screw loosening of the S1 left pedicle screw with halo appearance    Procedures, if performed today     None performed       Portions of the record may have been created with voice recognition software.  Occasional wrong word or \"sound a like\" substitutions may have occurred due to the inherent limitations of voice recognition software.  Read the chart carefully and recognize, using context, where substitutions have occurred.     Scribe Attestation      I,:  Alfonzo Dubois am acting as a scribe while in the presence of the attending physician.:       I,:  Charles Mcrae MD personally performed the services described in this documentation    as scribed in my presence.:            "

## 2025-01-13 NOTE — LETTER
January 13, 2025     Patient: Vesna Langston  YOB: 1960  Date of Visit: 1/13/2025      To Whom it May Concern:    Vesna Langston is under my professional care. Vesna was seen in my office on 1/13/2025. Vesna should remain out of work until cleared by orthopedic surgeon.     If you have any questions or concerns, please don't hesitate to call.         Sincerely,          Charles Mcrae MD        CC: No Recipients

## 2025-01-13 NOTE — TELEPHONE ENCOUNTER
Note in chart after leaving a message for someone to schedule there MRI appointment:  I left a message today  to call me at [phone] to schedule their MRI appointment.

## 2025-01-13 NOTE — TELEPHONE ENCOUNTER
Called and spoke to pt and relayed BRANDEN Beltran's message. She stated understanding. She is also in cardiac PT is that ok? All the workouts she is leaning against a seat and she doesn't strain her back.

## 2025-01-13 NOTE — TELEPHONE ENCOUNTER
1-28-25 AT 2:00PM @ [facility]    Note in chart after scheduling pacemaker:  Topics covered in our conversation:  MRI scheduled on above date and time.  Reminded to get chest x-ray follow-up prior to MRI appt.

## 2025-01-13 NOTE — TELEPHONE ENCOUNTER
Caller: Vesna    Doctor: Dr. Mcrae    Reason for call: Patient is asking if she should continue her Aqua therapy? If so she would a new script faxed. States she does not have the fax number but she had the phone number.     Phone Number: [phone] [facility]    Call back#: [phone]

## 2025-01-13 NOTE — LETTER
2025     Melissa Montoya, DO  [address]  [facility] [address]    Patient: Vesna Langston   YOB: 1960   Date of Visit: 2025       Dear Dr. Montoya:    Thank you for referring Vesna Langston to me for evaluation. Below are my notes for this consultation.    If you have questions, please do not hesitate to call me. I look forward to following your patient along with you.         Sincerely,        Charles Mcrae MD        CC: MD Charles Anderson MD  2025 10:23 AM  Sign when Signing Visit  Assessment & Plan/Medical Decision Makin y.o. female with Back Pain, Left Radicular Leg Pain, and Ambulatory Dysfunction and imaging findings most notable for lumbar spondylosis, L3-S1 fusion construct with evidence of pseudoarthrosis          The clinical, physical and imaging findings were reviewed with the patient.  Vesna  has a constellation of findings consistent with Lumbar Myofascial Pain, Lumbar Radiculopathy, and Ambulatory Dysfunction in the setting of lumbar degenerative disease, history of workplace injury that is post L3-S1 fusion with evidence of pseudoarthrosis.      Fortunately patient does not remain neurologically intact and functional. Physical exam showing weakness of the left lower extremity and ambulatory dysfunction.  We discussed the treatment options including physical therapy, at home exercises, activity modifications, chiropractic medicine, oral medications, interventional spine procedures.  At this time recommend surgical intervention due to ongoing symptoms related to pseudoarthrosis.  Metabolic workup was initiated along with updated lumbar MRI.    Patient instructed to return to office/ER sooner if symptoms are not improving, getting worse, or new worrisome/neurologic symptoms arise.  Patient will follow up when MRI and lab results are in for re-evaluation after further conservative treatments.       Subjective:      Chief  Complaint: Back Pain    HPI:  Vesna Langston is a 64 y.o. female presenting for initial visit with chief complaint of low back pain - referred by Dr. Pete. This is a second opinion requested by workers comp.  Pain began 2 years ago after an injury at work. She received a fusion of L3-S1 in 2023.  2 weeks after she return to work she suffered a twisting injury which caused her to return to her surgeon.  X-ray revealed loosening of multiple screws in her lumbar spine.  She was offered an SI injection to help with low back pain. 5 hours after the injection she suffered a heart attack in which a she received an ICD and stent. Today, she claims that she cannot feel her left leg. She attempts to walk on a treadmill for her cardiac rehab in which her left leg will give out 10-15 times during her hour long rehab. She reports that she is currently in physical therapy that is providing no relief.  Describes pain as crushing in nature.  Located in low back.  + numbness . + burning.    Denies fever or chills, no night sweats. Denies any bladder or bowel changes.      Conservative therapy includes the following:   Medications: Tylenol arthritis, gabapentin 2x per day. Norco as needed.    Injections: Yes SI joint 2 years ago     Physical Therapy: Yes  Chiropractic Medicine: has not attempted  Accupunture/Massage Therapy: has not attempted   These therapeutic modalities were ineffective at providing sustained pain relief/functional improvement.     Nicotine dependent: no  Occupation: disabled  Living situation: Lives with family   ADLs: patient is unable to perform ADL's such as shopping, cleaning, driving, etc     Objective:     Family History   Adopted: Yes   Problem Relation Age of Onset   • Depression Mother    • Heart disease Father    • OCD Daughter        Past Medical History:   Diagnosis Date   • Acute respiratory insufficiency 03/22/2024   • Allergic    • Chronic HFrEF (heart failure with reduced ejection fraction) ([facility])     • Coronary artery disease    • COVID-19 virus infection 11/28/2022   • Diabetes mellitus ([facility])    • Disease of thyroid gland 4/09/2024   • Heart disease 3/24   • Hyperlipidemia    • Hypoglycemia    • ICD (implantable cardioverter-defibrillator) in place    • Ischemic cardiomyopathy    • Lactic acidosis 03/22/2024   • Myocardial infarction ([facility]) 3/22/2024   • Otitis media 1966   • Seasonal allergies    • ST elevation myocardial infarction involving left anterior descending (LAD) coronary artery ([facility]) 03/22/2024   • Tobacco abuse    • Visual impairment 1967       Current Outpatient Medications   Medication Sig Dispense Refill   • albuterol (ProAir HFA) 90 mcg/act inhaler Inhale 2 puffs every 6 (six) hours as needed for wheezing 8.5 g 0   • apixaban (ELIQUIS) 5 mg Take 1 tablet (5 mg total) by mouth 2 (two) times a day 180 tablet 1   • atorvastatin (LIPITOR) 80 mg tablet TAKE 1 TABLET BY MOUTH EVERY EVENING 100 tablet 1   • clopidogrel (PLAVIX) 75 mg tablet Take 1 tablet (75 mg total) by mouth daily 90 tablet 3   • diphenhydrAMINE (BENADRYL) 25 mg capsule Take 25 mg by mouth every 6 (six) hours as needed for itching     • dofetilide (TIKOSYN) 125 mcg capsule Take 1 capsule (125 mcg total) by mouth every 12 (twelve) hours 180 capsule 3   • Empagliflozin (Jardiance) 25 MG TABS Take 1 tablet (25 mg total) by mouth every morning 30 tablet 17   • fluticasone (FLONASE) 50 mcg/act nasal spray 1 spray into each nostril daily 9.9 mL 0   • furosemide (LASIX) 40 mg tablet Take 1 tablet (40 mg total) by mouth 2 (two) times a day 60 tablet 17   • gabapentin (Neurontin) 100 mg capsule Take 1 capsule (100 mg total) by mouth 2 (two) times a day     • glimepiride (AMARYL) 1 mg tablet Take 1 tablet (1 mg total) by mouth daily with breakfast 90 tablet 3   • HYDROcodone-acetaminophen (NORCO) 5-325 mg per tablet Take 1 tablet by mouth every 6 (six) hours as needed for pain for up to 10 doses Max Daily Amount: 4 tablets 10 tablet 0   •  isosorbide mononitrate (IMDUR) 30 mg 24 hr tablet Take 1 tablet (30 mg total) by mouth 2 (two) times a day 30 mg in the am and 30 mg in the pm 60 tablet 17   • levothyroxine (Synthroid) 75 mcg tablet Take 1 tablet (75 mcg total) by mouth daily 90 tablet 3   • meclizine (ANTIVERT) 25 mg tablet Take 1 tablet (25 mg total) by mouth every 8 (eight) hours as needed for dizziness 30 tablet 0   • metoprolol succinate (TOPROL-XL) 25 mg 24 hr tablet Take 2 tablets (50 mg total) by mouth daily at bedtime 180 tablet 5   • nitroglycerin (NITROSTAT) 0.4 mg SL tablet Place 1 tablet (0.4 mg total) under the tongue every 5 (five) minutes as needed for chest pain 30 tablet 0   • pantoprazole (PROTONIX) 40 mg tablet TAKE 1 TABLET BY MOUTH 2 TIMES A DAY BEFORE MEALS. 180 tablet 1   • potassium chloride (Klor-Con M20) 20 mEq tablet TAKE 1 TABLET BY MOUTH EVERY DAY 90 tablet 1   • sacubitril-valsartan (Entresto) 49-51 MG TABS Take 1 tablet by mouth 2 (two) times a day 60 tablet 17   • semaglutide, 0.25 or 0.5 mg/dose, (Ozempic, 0.25 or 0.5 MG/DOSE,) 2 mg/3 mL injection pen Inject 0.375 mL (0.25 mg total) under the skin every 7 days for 28 days, THEN 0.75 mL (0.5 mg total) every 7 days for 28 days. 9 mL 0   • [START ON 2/17/2025] semaglutide, 1 mg/dose, (Ozempic) 4 mg/3 mL injection pen Inject 0.75 mL (1 mg total) under the skin once a week for 28 days Do not start before February 17, 2025. (Patient not taking: Reported on 1/3/2025 Do not start before February 17, 2025.) 3 mL 0   • [START ON 3/17/2025] semaglutide, 2 mg/dose, (Ozempic) 8 mg/ mL injection pen Inject 0.75 mL (2 mg total) under the skin every 7 days Do not start before March 17, 2025. (Patient not taking: Reported on 1/3/2025 Do not start before March 17, 2025.) 9 mL 1     No current facility-administered medications for this visit.       Past Surgical History:   Procedure Laterality Date   • ADENOIDECTOMY     • APPENDECTOMY     • APPENDECTOMY LAPAROSCOPIC N/A 05/08/2024     Procedure: APPENDECTOMY LAPAROSCOPIC;  Surgeon: Lauryn Ullrich, DO;  Location: [facility];  Service: General   • BACK SURGERY     • BREAST EXCISIONAL BIOPSY Right 2000    cyst removed- benign   • CARDIAC CATHETERIZATION N/A 2024    Procedure: Cardiac pci;  Surgeon: Gabriel Myers MD;  [facility];  Service: Cardiology   • CARDIAC CATHETERIZATION N/A 2024    Procedure: Cardiac Coronary Angiogram;  Surgeon: Gabriel Myers MD;  [facility];  Service: Cardiology   • CARDIAC CATHETERIZATION N/A 2024    Procedure: Cardiac PCI AMI;  Surgeon: Gabriel Myers MD;  [facility];  Service: Cardiology   • CARDIAC CATHETERIZATION N/A 2024    Procedure: Cardiac IVUS;  Surgeon: Gabriel Myers MD;  [facility];  Service: Cardiology   • CARDIAC DEFIBRILLATOR PLACEMENT     • CARDIAC ELECTROPHYSIOLOGY PROCEDURE N/A 2024    Procedure: Cardiac icd implant;  Surgeon: Jeffy Lama MD;  [facility];  Service: Cardiology   •  SECTION     • COLONOSCOPY     • ECTOPIC PREGNANCY SURGERY     • INSERT / REPLACE / REMOVE PACEMAKER     • MASS EXCISION Left 10/18/2024    Procedure: EXCISION BIOPSY TISSUE LESION/MASS UPPER EXTREMITY - Left index finger;  Surgeon: Leandro Campos MD;  Location: [facility];  Service: Orthopedics   • SEPTOPLASTY     • SPINE SURGERY  23   • TONSILLECTOMY         Social History     Socioeconomic History   • Marital status:      Spouse name: Not on file   • Number of children: 3   • Years of education: Not on file   • Highest education level: Not on file   Occupational History   • Not on file   Tobacco Use   • Smoking status: Former     Current packs/day: 0.00     Average packs/day: 1 pack/day for 24.2 years (24.2 ttl pk-yrs)     Types: Cigarettes     Start date: 2000     Quit date: 3/22/2024     Years since quittin.8     Passive exposure: Never   • Smokeless tobacco: Never   •  Tobacco comments:     Smoked 0.5-1 ppd; quit in 03/2024.    Vaping Use   • Vaping status: Never Used   Substance and Sexual Activity   • Alcohol use: Never     Alcohol/week: 1.0 standard drink of alcohol     Types: 1 Shots of liquor per week     Comment: Every 2or 3months   • Drug use: Never   • Sexual activity: Not on file   Other Topics Concern   • Not on file   Social History Narrative   • Not on file     Social Drivers of Health     Financial Resource Strain: Low Risk  (2/20/2023)    [facility]    Overall Financial Resource Strain (CARDIA)    • Difficulty of Paying Living Expenses: Not hard at all   Food Insecurity: No Food Insecurity (11/25/2024)    Nursing - Inadequate Food Risk Classification    • Worried About Running Out of Food in the Last Year: Never true    • Ran Out of Food in the Last Year: Never true    • Ran Out of Food in the Last Year: Never true   Transportation Needs: No Transportation Needs (11/25/2024)    Nursing - Transportation Risk Classification    • Lack of Transportation: Not on file    • Lack of Transportation: No   Physical Activity: Not on file   Stress: No Stress Concern Present (2/20/2023)    [facility] from [facility], [facility]    [employer] - Occupational Stress Questionnaire    • Feeling of Stress : Not at all   Social Connections: Unknown (6/18/2024)    Received from [facility]    Social Connections    • How often do you feel lonely or isolated from those around you? (Adult - for ages 18 years and over): Not on file   Intimate Partner Violence: Unknown (11/25/2024)    Nursing IPS    • Feels Physically and Emotionally Safe: Not on file    • Physically Hurt by Someone: Not on file    • Humiliated or Emotionally Abused by Someone: Not on file    • Physically Hurt by Someone: No    • Hurt or Threatened by Someone: No   Housing Stability: Unknown (11/25/2024)  "   Nursing: Inadequate Housing Risk Classification    • Has Housing: Not on file    • Worried About Losing Housing: Not on file    • Unable to Get Utilities: Not on file    • Unable to Pay for Housing in the Last Year: No    • Has Housin   Recent Concern: Housing Stability - High Risk (2024)    Housing Stability Vital Sign    • Unable to Pay for Housing in the Last Year: No    • Number of Times Moved in the Last Year: 2    • Homeless in the Last Year: No       Allergies   Allergen Reactions   • Fish-Derived Products - Food Allergy Anaphylaxis     Cannot have all seafood products   • Shellfish-Derived Products - Food Allergy Anaphylaxis   • Azithromycin Hives and Rash       Review of Systems  General- denies fever/chills  HEENT- denies hearing loss or sore throat  Eyes- denies eye pain or visual disturbances, denies red eyes  Respiratory- denies cough or SOB  Cardio- denies chest pain or palpitations  GI- denies abdominal pain  Endocrine- denies urinary frequency  Urinary- denies pain with urination  Musculoskeletal- Negative except noted above  Skin- denies rashes or wounds  Neurological- denies dizziness or headache  Psychiatric- denies anxiety or difficulty concentrating    Physical Exam  Ht 5' 8\" (1.727 m)   Wt 104 kg (229 lb 4.5 oz)   BMI 34.86 kg/m²     General/Constitutional: No apparent distress: well-nourished and well developed.  Lymphatic: No appreciable lymphadenopathy  Respiratory: Non-labored breathing  Vascular: No edema, swelling or tenderness, except as noted in detailed exam.  Integumentary: No impressive skin lesions present, except as noted in detailed exam.  Psych: Normal mood and affect, oriented to person, place and time.  MSK: normal other than stated in HPI and exam  Gait & balance: no evidence of myelopathic gait, ambulates with a Cane    Lumbar spine range of motion:  -Forward flexion to 45  -Extension to neutral  -Lateral bend 15 right, 15 left  -Rotation 5 right, 5 left  There " "is tenderness with palpation along lumbar paraspinal musculature, no midline tenderness , well-healed surgical sites    Neurologic:    Lower Extremity Motor Function    Right  Left    Iliopsoas  5/5  4/5    Quadriceps 5/5 4/5   Tibialis anterior  5/5  4/5    EHL  5/5  3+/5    Gastroc. muscle  5/5  2/5    Heel rise  5/5  2/5    Toe rise  5/5  2/5      Sensory: light touch is intact to bilateral upper and lower extremities     Reflexes: Intact/diminished    Other tests:  Straight Leg Raise: positive  Jazmyn SI: positive  MAGNO SI: negative  Greater troch: no tenderness   Internal/external hip ROM: intact, no pain   Flexion/extension knee ROM: intact, no pain   Vascular: WWP extremities    Diagnostic Tests   IMAGING: I have personally reviewed the images and these are my findings:  Lumbar Spine X-rays from 8/20/24: multi level lumbar spondylosis, L3-S1 bilateral pedicle screw and aliya construct with obvious loosening of the L3 screws, the right L4 screw is misplaced and not within the pedicle, L4-5 spondylolisthesis    Lumbar Spine MRI from 3/18/2024: L3-S1 fusion construct with no significant central or foraminal stenosis appreciated    CT abdomen and pelvis from 5/7/2024: L3-S1 fusion construct with gross loosening of the L3 screws with halo appearance around bilateral screws, L4 screws misplaced and not within the pedicle, the bilateral L5 screws appear to have lateral cortex violation, screw loosening of the S1 left pedicle screw with halo appearance    Procedures, if performed today     None performed       Portions of the record may have been created with voice recognition software.  Occasional wrong word or \"sound a like\" substitutions may have occurred due to the inherent limitations of voice recognition software.  Read the chart carefully and recognize, using context, where substitutions have occurred.     Scribe Attestation      I,:  Alfonzo Dubois am acting as a scribe while in the presence of the attending " physician.:       I,:  Charles Mcrae MD personally performed the services described in this documentation    as scribed in my presence.:

## 2025-01-14 ENCOUNTER — APPOINTMENT (OUTPATIENT)
Dept: LAB | Facility: HOSPITAL | Age: 65
End: 2025-01-14
Payer: OTHER MISCELLANEOUS

## 2025-01-14 ENCOUNTER — HOSPITAL ENCOUNTER (OUTPATIENT)
Dept: RADIOLOGY | Facility: HOSPITAL | Age: 65
Discharge: HOME | End: 2025-01-14
Payer: COMMERCIAL

## 2025-01-14 DIAGNOSIS — M54.9 BACK PAIN, UNSPECIFIED BACK LOCATION, UNSPECIFIED BACK PAIN LATERALITY, UNSPECIFIED CHRONICITY: ICD-10-CM

## 2025-01-14 DIAGNOSIS — R52 PAIN: ICD-10-CM

## 2025-01-14 DIAGNOSIS — S32.009K PSEUDOARTHROSIS OF LUMBAR SPINE: ICD-10-CM

## 2025-01-14 LAB
25(OH)D3 SERPL-MCNC: 10.9 NG/ML (ref 30–100)
ALBUMIN SERPL BCG-MCNC: 4.4 G/DL (ref 3.5–5)
ALP SERPL-CCNC: 50 U/L (ref 34–104)
ALT SERPL W P-5'-P-CCNC: 18 U/L (ref 7–52)
ANION GAP SERPL CALCULATED.3IONS-SCNC: 7 MMOL/L (ref 4–13)
AST SERPL W P-5'-P-CCNC: 17 U/L (ref 13–39)
BASOPHILS # BLD AUTO: 0.07 THOUSANDS/ΜL (ref 0–0.1)
BASOPHILS NFR BLD AUTO: 1 % (ref 0–1)
BILIRUB SERPL-MCNC: 0.66 MG/DL (ref 0.2–1)
BUN SERPL-MCNC: 17 MG/DL (ref 5–25)
CALCIUM SERPL-MCNC: 9.7 MG/DL (ref 8.4–10.2)
CHLORIDE SERPL-SCNC: 104 MMOL/L (ref 96–108)
CO2 SERPL-SCNC: 27 MMOL/L (ref 21–32)
CREAT SERPL-MCNC: 0.97 MG/DL (ref 0.6–1.3)
CRP SERPL QL: 1.3 MG/L
EOSINOPHIL # BLD AUTO: 0.38 THOUSAND/ΜL (ref 0–0.61)
EOSINOPHIL NFR BLD AUTO: 5 % (ref 0–6)
ERYTHROCYTE [DISTWIDTH] IN BLOOD BY AUTOMATED COUNT: 15.5 % (ref 11.6–15.1)
ERYTHROCYTE [SEDIMENTATION RATE] IN BLOOD: 24 MM/HOUR (ref 0–29)
GFR SERPL CREATININE-BSD FRML MDRD: 61 ML/MIN/1.73SQ M
GLUCOSE P FAST SERPL-MCNC: 116 MG/DL (ref 65–99)
HCT VFR BLD AUTO: 38.8 % (ref 34.8–46.1)
HGB BLD-MCNC: 12.2 G/DL (ref 11.5–15.4)
IMM GRANULOCYTES # BLD AUTO: 0.02 THOUSAND/UL (ref 0–0.2)
IMM GRANULOCYTES NFR BLD AUTO: 0 % (ref 0–2)
LYMPHOCYTES # BLD AUTO: 2.44 THOUSANDS/ΜL (ref 0.6–4.47)
LYMPHOCYTES NFR BLD AUTO: 29 % (ref 14–44)
MCH RBC QN AUTO: 26.3 PG (ref 26.8–34.3)
MCHC RBC AUTO-ENTMCNC: 31.4 G/DL (ref 31.4–37.4)
MCV RBC AUTO: 84 FL (ref 82–98)
MONOCYTES # BLD AUTO: 0.72 THOUSAND/ΜL (ref 0.17–1.22)
MONOCYTES NFR BLD AUTO: 9 % (ref 4–12)
NEUTROPHILS # BLD AUTO: 4.77 THOUSANDS/ΜL (ref 1.85–7.62)
NEUTS SEG NFR BLD AUTO: 56 % (ref 43–75)
NRBC BLD AUTO-RTO: 0 /100 WBCS
PLATELET # BLD AUTO: 322 THOUSANDS/UL (ref 149–390)
PMV BLD AUTO: 9.5 FL (ref 8.9–12.7)
POTASSIUM SERPL-SCNC: 4.1 MMOL/L (ref 3.5–5.3)
PREALB SERPL-MCNC: 25.4 MG/DL (ref 17–34)
PROT SERPL-MCNC: 7.2 G/DL (ref 6.4–8.4)
PTH-INTACT SERPL-MCNC: 110.2 PG/ML (ref 12–88)
RBC # BLD AUTO: 4.64 MILLION/UL (ref 3.81–5.12)
SODIUM SERPL-SCNC: 138 MMOL/L (ref 135–147)
T4 FREE SERPL-MCNC: 0.84 NG/DL (ref 0.61–1.12)
TSH SERPL DL<=0.05 MIU/L-ACNC: 5.22 UIU/ML (ref 0.45–4.5)
WBC # BLD AUTO: 8.4 THOUSAND/UL (ref 4.31–10.16)

## 2025-01-14 PROCEDURE — 80053 COMPREHEN METABOLIC PANEL: CPT

## 2025-01-14 PROCEDURE — 84134 ASSAY OF PREALBUMIN: CPT

## 2025-01-14 PROCEDURE — 36415 COLL VENOUS BLD VENIPUNCTURE: CPT

## 2025-01-14 PROCEDURE — 72110 X-RAY EXAM L-2 SPINE 4/>VWS: CPT

## 2025-01-14 PROCEDURE — 86140 C-REACTIVE PROTEIN: CPT

## 2025-01-14 PROCEDURE — 84443 ASSAY THYROID STIM HORMONE: CPT

## 2025-01-14 PROCEDURE — 85652 RBC SED RATE AUTOMATED: CPT

## 2025-01-14 PROCEDURE — 84439 ASSAY OF FREE THYROXINE: CPT

## 2025-01-14 PROCEDURE — 83970 ASSAY OF PARATHORMONE: CPT

## 2025-01-14 PROCEDURE — 85025 COMPLETE CBC W/AUTO DIFF WBC: CPT

## 2025-01-14 PROCEDURE — 82306 VITAMIN D 25 HYDROXY: CPT

## 2025-01-14 NOTE — TELEPHONE ENCOUNTER
Called and spoke with pt and relayed BRANDEN Beltran's message, she stated understanding. She also asked, her pain Dr gave her a Trend back brace, she would like to know, in your opinion is that ok for her to wear or would you recommend something else? She also wanted to let you know she had the lab work done this AM and will be having her pain management records sent over to you for review. Please advise, thanks!

## 2025-01-15 ENCOUNTER — TELEPHONE (OUTPATIENT)
Age: 65
End: 2025-01-15

## 2025-01-15 ENCOUNTER — NURSE TRIAGE (OUTPATIENT)
Age: 65
End: 2025-01-15

## 2025-01-15 DIAGNOSIS — E55.9 VITAMIN D DEFICIENCY: Primary | ICD-10-CM

## 2025-01-15 DIAGNOSIS — I21.3 STEMI (ST ELEVATION MYOCARDIAL INFARCTION) (HCC): ICD-10-CM

## 2025-01-15 DIAGNOSIS — E78.5 HYPERLIPIDEMIA, UNSPECIFIED HYPERLIPIDEMIA TYPE: ICD-10-CM

## 2025-01-15 DIAGNOSIS — I21.02 ST ELEVATION MYOCARDIAL INFARCTION INVOLVING LEFT ANTERIOR DESCENDING (LAD) CORONARY ARTERY (HCC): ICD-10-CM

## 2025-01-15 RX ORDER — ERGOCALCIFEROL 1.25 MG/1
50000 CAPSULE, LIQUID FILLED ORAL WEEKLY
Qty: 12 CAPSULE | Refills: 1 | Status: SHIPPED | OUTPATIENT
Start: 2025-01-15

## 2025-01-15 RX ORDER — ATORVASTATIN CALCIUM 80 MG/1
80 TABLET, FILM COATED ORAL EVERY EVENING
Qty: 90 TABLET | Refills: 1 | Status: SHIPPED | OUTPATIENT
Start: 2025-01-15

## 2025-01-15 NOTE — TELEPHONE ENCOUNTER
Patient calls to ask about Vitamin D supplement. Patient calls because orthopedics ordered lab work. Patient will be having surgery on her back with Dr. Mcrae. Please review the results and then suggest a Vitamin supplement or prescription Vitamin D. Patient can be reached at [phone].

## 2025-01-15 NOTE — ASSESSMENT & PLAN NOTE
felt to be scar mediated, cMRI done this admission to delineate where scar is  had been on amio from March to November 2024,   Transitioned to dofetilide 125mcg twice daily 11/2024 (Qtc that time ~500msec but per documentation was confirmed with attending to keep at 125mcg BID with low rate of device set to 70)  beta blocker therapy of metoprolol succinate

## 2025-01-15 NOTE — ASSESSMENT & PLAN NOTE
EF of 35% per echo 3/23/2024, repeat echo 10/2024 showing EF of 40-45%  cMRI 3/2024 showing thinning and hypo- to akinesis of the mid anterior, anteroseptal and inferoseptal walls, the apical anterior, septal and inferior walls and the apex  now started on GDMT of metoprolol succinate and Entresto  Follows w/ HF team Dr. Bell

## 2025-01-15 NOTE — ASSESSMENT & PLAN NOTE
anticoagulated with Eliquis / [id number] score of 4 (age, sex, CHF, DM)  EF of 35% per echo 3/2024 /moderate dilation of left atrium noted  rate control: metoprolol succinate 50mg daily  antiarrhythmic therapy: dofetilide 125mcg BID (switched from amio due to long term toxicities)  prior cardioversion: none  prior ablation: none

## 2025-01-15 NOTE — TELEPHONE ENCOUNTER
"Attempted to reach Vesna to relate the message per [place] \"I placed an order for Vitamin D supplement to her pharmacy. This is high dose vitamin D taken once a week. We will recheck in 3-6 months.\" Patient didn't answer/ left a voicemail.     "

## 2025-01-15 NOTE — ASSESSMENT & PLAN NOTE
presented as STEMI  cardiac cath 3/2024 showing single-vessel CAD with ostial total occlusion of LAD status post IVUS guided PCI to ostial/proximal LAD and placement of NATHAN  had been felt this was due to chronic scar rather than acute thrombus given marked difficulty crossing total occlusion with a wire and resistance to opening with balloon dilation  now started on aspirin, Plavix, atorvastatin, and metoprolol succinate (triple therapy with Eliquis for only 1 week post cath)

## 2025-01-15 NOTE — ASSESSMENT & PLAN NOTE
[employer] dual-chamber ICD in situ, implanted by Dr. Lama on 3/27/2024 due to scar related VT and ischemic cardiomyopathy

## 2025-01-15 NOTE — PROGRESS NOTES
Electrophysiology Follow Up  [facility]  [facility] [address], [place] [address]    Name: Vesna Langston  : 1960  MRN: [id number]    Assessment & Plan  History of sustained ventricular tachycardia  felt to be scar mediated, cMRI done this admission to delineate where scar is  had been on amio from March to 2024,   Transitioned to dofetilide 125mcg twice daily 2024 (Qtc that time ~500msec but per documentation was confirmed with attending to keep at 125mcg BID with low rate of device set to 70)  beta blocker therapy of metoprolol succinate  Atrial flutter, paroxysmal (HCC)  anticoagulated with Eliquis / [id number] score of 4 (age, sex, CHF, DM)  EF of 35% per echo 3/2024 /moderate dilation of left atrium noted  rate control: metoprolol succinate 50mg daily  antiarrhythmic therapy: dofetilide 125mcg BID (switched from amio due to long term toxicities)  prior cardioversion: none  prior ablation: none  S/P ICD (internal cardiac defibrillator) procedure  Medtronic dual-chamber ICD in situ, implanted by Dr. Lama on 3/27/2024 due to scar related VT and ischemic cardiomyopathy   Coronary artery disease involving native coronary artery of native heart without angina pectoris  presented as STEMI  cardiac cath 3/2024 showing single-vessel CAD with ostial total occlusion of LAD status post IVUS guided PCI to ostial/proximal LAD and placement of NATHAN  had been felt this was due to chronic scar rather than acute thrombus given marked difficulty crossing total occlusion with a wire and resistance to opening with balloon dilation  now started on aspirin, Plavix, atorvastatin, and metoprolol succinate (triple therapy with Eliquis for only 1 week post cath)  Ischemic cardiomyopathy  EF of 35% per echo 3/23/2024, repeat echo 10/2024 showing EF of 40-45%  cMRI 3/2024 showing thinning and hypo- to akinesis of the mid anterior, anteroseptal and inferoseptal walls,  the apical anterior, septal and inferior walls and the apex  now started on GDMT of metoprolol succinate and Entresto  Follows w/ HF team Dr. Arabella LORD (obstructive sleep apnea)  Recommend CPAP compliance  Class 1 obesity due to excess calories with serious comorbidity and body mass index (BMI) of 34.0 to 34.9 in adult  Recommend weight loss through healthy diet and exercise       Discussion/Plan:    Patient has a history of sustained VT and atrial flutter for which she was recently admitted 11/2024 for dofetilide initiation    She is currently maintained on dofetilide 125 mcg twice daily, as well as metoprolol and Eliquis as above    Since being initiated on dofetilide she reports she has been feeling well and currently denies acute cardiac complaint.    She does note that her Apple Watch did alert her for A-fib this morning.  But device interrogation in office today failed to reveal any noted A-fib/flutter episodes.    She denies any significant adverse bleeding events on Eliquis    EKG in office today showing normal sinus rhythm w/ QTC 480ms and ventricular rate 75 bpm    Her latest lab evaluation 1/14/2025 shows stable renal function without significant electrolyte deficiency    No acute medication changes made today    To continue scheduled device monitoring moving forwards    Modifiable risk factors were discussed today including weight loss, avoiding alcohol/tobacco products, and treatment of POP/HTN/DM    On another note, her blood pressure was soft in office today around 98/56.  She reports her pressure tends to run soft and that this is normal for her.  She is currently asymptomatic in regards to this.    Patient has been instructed to follow up in our EP office in 6 months or as needed. She will call our office with any questions or concerns in the meantime.    Rhythm History:   Atrial fibrillation:      Atrial flutter:      SVT:      VT/VF/PVC:     Device history:   Pacemaker:     Defibrillator:     BIV  PPM:     BIV ICD:     ILR:    Interim History/HPI:   Interim history: Vesna Langston is a 64 y.o. female with a PMH of VT s/p ICD, ischemic cardiomyopathy, CAD, atrial flutter, POP, and obesity.    She presents today for routine outpatient follow-up given her history of VT with ICD in situ and atrial flutter.  She was recently initiated on dofetilide in the hospital 11/2024.  Since this stay she reports that her Apple Watch did give an alert for A-fib this morning, but otherwise she has been feeling well and denies acute cardiac complaint. She denies any significant adverse bleeding events whilst on Eliquis.  She does note a desire for an upcoming back surgery giving some hardware issues in her back and plans to discuss the possibility of cardiac clearance with her primary cardiologist Dr. Bell.    EKG: normal sinus rhythm w/ QTC 480ms and ventricular rate 75 bpm    Review of Systems   Constitutional:  Negative for appetite change, chills, fatigue, fever and unexpected weight change.   Respiratory:  Negative for chest tightness and shortness of breath.    Cardiovascular:  Negative for chest pain, palpitations and leg swelling.   Neurological:  Negative for dizziness, syncope, weakness and light-headedness.         OBJECTIVE:   Vitals:   There were no vitals taken for this visit.  There is no height or weight on file to calculate BMI.        Physical Exam:   Physical Exam  Constitutional:       General: She is not in acute distress.     Appearance: Normal appearance. She is not ill-appearing.   HENT:      Head: Normocephalic and atraumatic.      Nose: Nose normal.   Eyes:      General:         Right eye: No discharge.         Left eye: No discharge.   Neck:      Vascular: No carotid bruit.   Cardiovascular:      Rate and Rhythm: Normal rate and regular rhythm.      Pulses: Normal pulses.      Heart sounds: Normal heart sounds.   Pulmonary:      Effort: Pulmonary effort is normal.      Breath sounds: Normal breath sounds.    Musculoskeletal:      Right lower leg: No edema.      Left lower leg: No edema.   Skin:     General: Skin is warm and dry.      Capillary Refill: Capillary refill takes less than 2 seconds.   Neurological:      Mental Status: She is alert.          Medications:      Current Outpatient Medications:     albuterol (ProAir HFA) 90 mcg/act inhaler, Inhale 2 puffs every 6 (six) hours as needed for wheezing, Disp: 8.5 g, Rfl: 0    apixaban (ELIQUIS) 5 mg, Take 1 tablet (5 mg total) by mouth 2 (two) times a day, Disp: 180 tablet, Rfl: 1    atorvastatin (LIPITOR) 80 mg tablet, TAKE 1 TABLET BY MOUTH EVERY EVENING, Disp: 100 tablet, Rfl: 1    clopidogrel (PLAVIX) 75 mg tablet, Take 1 tablet (75 mg total) by mouth daily, Disp: 90 tablet, Rfl: 3    diphenhydrAMINE (BENADRYL) 25 mg capsule, Take 25 mg by mouth every 6 (six) hours as needed for itching, Disp: , Rfl:     dofetilide (TIKOSYN) 125 mcg capsule, Take 1 capsule (125 mcg total) by mouth every 12 (twelve) hours, Disp: 180 capsule, Rfl: 3    Empagliflozin (Jardiance) 25 MG TABS, Take 1 tablet (25 mg total) by mouth every morning, Disp: 30 tablet, Rfl: 17    fluticasone (FLONASE) 50 mcg/act nasal spray, 1 spray into each nostril daily, Disp: 9.9 mL, Rfl: 0    furosemide (LASIX) 40 mg tablet, Take 1 tablet (40 mg total) by mouth 2 (two) times a day, Disp: 60 tablet, Rfl: 17    gabapentin (Neurontin) 100 mg capsule, Take 1 capsule (100 mg total) by mouth 2 (two) times a day, Disp: , Rfl:     glimepiride (AMARYL) 1 mg tablet, Take 1 tablet (1 mg total) by mouth daily with breakfast, Disp: 90 tablet, Rfl: 3    HYDROcodone-acetaminophen (NORCO) 5-325 mg per tablet, Take 1 tablet by mouth every 6 (six) hours as needed for pain for up to 10 doses Max Daily Amount: 4 tablets, Disp: 10 tablet, Rfl: 0    isosorbide mononitrate (IMDUR) 30 mg 24 hr tablet, Take 1 tablet (30 mg total) by mouth 2 (two) times a day 30 mg in the am and 30 mg in the pm, Disp: 60 tablet, Rfl: 17     levothyroxine (Synthroid) 75 mcg tablet, Take 1 tablet (75 mcg total) by mouth daily, Disp: 90 tablet, Rfl: 3    meclizine (ANTIVERT) 25 mg tablet, Take 1 tablet (25 mg total) by mouth every 8 (eight) hours as needed for dizziness, Disp: 30 tablet, Rfl: 0    metoprolol succinate (TOPROL-XL) 25 mg 24 hr tablet, Take 2 tablets (50 mg total) by mouth daily at bedtime, Disp: 180 tablet, Rfl: 5    nitroglycerin (NITROSTAT) 0.4 mg SL tablet, Place 1 tablet (0.4 mg total) under the tongue every 5 (five) minutes as needed for chest pain, Disp: 30 tablet, Rfl: 0    pantoprazole (PROTONIX) 40 mg tablet, TAKE 1 TABLET BY MOUTH 2 TIMES A DAY BEFORE MEALS., Disp: 180 tablet, Rfl: 1    potassium chloride (Klor-Con M20) 20 mEq tablet, TAKE 1 TABLET BY MOUTH EVERY DAY, Disp: 90 tablet, Rfl: 1    sacubitril-valsartan (Entresto) 49-51 MG TABS, Take 1 tablet by mouth 2 (two) times a day, Disp: 60 tablet, Rfl: 17    semaglutide, 0.25 or 0.5 mg/dose, (Ozempic, 0.25 or 0.5 MG/DOSE,) 2 mg/3 mL injection pen, Inject 0.375 mL (0.25 mg total) under the skin every 7 days for 28 days, THEN 0.75 mL (0.5 mg total) every 7 days for 28 days., Disp: 9 mL, Rfl: 0    [START ON 2/17/2025] semaglutide, 1 mg/dose, (Ozempic) 4 mg/3 mL injection pen, Inject 0.75 mL (1 mg total) under the skin once a week for 28 days Do not start before February 17, 2025. (Patient not taking: Reported on 1/3/2025 Do not start before February 17, 2025.), Disp: 3 mL, Rfl: 0    [START ON 3/17/2025] semaglutide, 2 mg/dose, (Ozempic) 8 mg/ mL injection pen, Inject 0.75 mL (2 mg total) under the skin every 7 days Do not start before March 17, 2025. (Patient not taking: Reported on 1/3/2025 Do not start before March 17, 2025.), Disp: 9 mL, Rfl: 1       Historical Information   Past Medical History:   Diagnosis Date    Acute respiratory insufficiency 03/22/2024    Allergic     Chronic HFrEF (heart failure with reduced ejection fraction) (HCC)     Coronary artery disease      COVID-19 virus infection 2022    Diabetes mellitus (HCC)     Disease of thyroid gland 2024    Heart disease 3/24    Hyperlipidemia     Hypoglycemia     ICD (implantable cardioverter-defibrillator) in place     Ischemic cardiomyopathy     Lactic acidosis 2024    Myocardial infarction (HCC) 3/22/2024    Otitis media 1966    Seasonal allergies     ST elevation myocardial infarction involving left anterior descending (LAD) coronary artery (HCC) 2024    Tobacco abuse     Visual impairment 1967       Past Surgical History:   Procedure Laterality Date    ADENOIDECTOMY      APPENDECTOMY      APPENDECTOMY LAPAROSCOPIC N/A 2024    Procedure: APPENDECTOMY LAPAROSCOPIC;  Surgeon: Lauryn Ullrich, DO;  Location: [facility];  Service: General    BACK SURGERY      BREAST EXCISIONAL BIOPSY Right 2000    cyst removed- benign    CARDIAC CATHETERIZATION N/A 2024    Procedure: Cardiac pci;  Surgeon: Gabriel Myers MD;  [facility];  Service: Cardiology    CARDIAC CATHETERIZATION N/A 2024    Procedure: Cardiac Coronary Angiogram;  Surgeon: Gabriel Myers MD;  [facility];  Service: Cardiology    CARDIAC CATHETERIZATION N/A 2024    Procedure: Cardiac PCI AMI;  Surgeon: Gabriel Myers MD;  [facility];  Service: Cardiology    CARDIAC CATHETERIZATION N/A 2024    Procedure: Cardiac IVUS;  Surgeon: Gabriel Myers MD;  [facility];  Service: Cardiology    CARDIAC DEFIBRILLATOR PLACEMENT      CARDIAC ELECTROPHYSIOLOGY PROCEDURE N/A 2024    Procedure: Cardiac icd implant;  Surgeon: Jeffy Lama MD;  [facility];  Service: Cardiology     SECTION      COLONOSCOPY      ECTOPIC PREGNANCY SURGERY      INSERT / REPLACE / REMOVE PACEMAKER      MASS EXCISION Left 10/18/2024    Procedure: EXCISION BIOPSY TISSUE LESION/MASS UPPER EXTREMITY - Left index finger;  Surgeon: Leandro Campos MD;  Location:   MAIN OR;  Service: Orthopedics    SEPTOPLASTY      SPINE SURGERY  23    TONSILLECTOMY         Social History     Substance and Sexual Activity   Alcohol Use Never    Alcohol/week: 1.0 standard drink of alcohol    Types: 1 Shots of liquor per week    Comment: Every 2or 3months     Social History     Substance and Sexual Activity   Drug Use Never     Social History     Tobacco Use   Smoking Status Former    Current packs/day: 0.00    Average packs/day: 1 pack/day for 24.2 years (24.2 ttl pk-yrs)    Types: Cigarettes    Start date: 2000    Quit date: 3/22/2024    Years since quittin.8    Passive exposure: Never   Smokeless Tobacco Never   Tobacco Comments    Smoked 0.5-1 ppd; quit in 2024.        Family History   Adopted: Yes   Problem Relation Age of Onset    Depression Mother     Heart disease Father     OCD Daughter          Labs & Results:  Below is the patient's most recent value for Albumin, ALT, AST, BUN, Calcium, Chloride, Cholesterol, CO2, Creatinine, GFR, Glucose, HDL, Hematocrit, Hemoglobin, Hemoglobin A1C, LDL, Magnesium, Phosphorus, Platelets, Potassium, PSA, Sodium, Triglycerides, and WBC.   Lab Results   Component Value Date    ALT 18 2025    AST 17 2025    BUN 17 2025    CALCIUM 9.7 2025     2025    CO2 27 2025    CREATININE 0.97 2025    HDL 42 (L) 2024    HCT 38.8 2025    HGB 12.2 2025    HGBA1C 7.1 (H) 2024    MG 2.1 2024    PHOS 3.0 2024     2025    K 4.1 2025    TRIG 218 (H) 2024    WBC 8.40 2025     Note: for a comprehensive list of the patient's lab results, access the Results Review activity.

## 2025-01-15 NOTE — TELEPHONE ENCOUNTER
"Reason for Conversation: Patient's vitamin D level is 10 and requires a supplement per orthopedics. She is not sure which one or how much to take. States Dr Bell or Dr Lama \"are the only ones who are to make any medication changes\". Also inquiring when to take it as she is on Lipitor, when she looked up what to take, it said not to take them together.    Please advise on dosage of Vitamin D and if it should be prescription strength.    Patient leaves at 3am Thursday for vacation and is hoping to get an answer before then.    Pain: No    Risk Factors: Heart Failure, Devices, Diabetic, and Arrhythmia    Recent relevant testing and date of testing: Echo and Labs June 2024, January 2025    Medication: Lipitor 80mg daily; Imdur 30mg BID; Jardiance 25mg daily; Plavix 75mg daily; Entresto 49-51mg BID; Tikosyn 125mcg BID; Lasix 40mg BID    Upcoming Office Visit: Yes 1-21-25    Last Office Visit: 1-3-25    Reason for Disposition   Caller wants to use a complementary or alternative medicine     Patient inquiring about Vitamin D supplement, level is 10.    Answer Assessment - Initial Assessment Questions  1. NAME of MEDICINE: \"What medicine(s) are you calling about?\"      Vitamin D supplement  2. QUESTION: \"What is your question?\" (e.g., double dose of medicine, side effect)      My vitamin D level is very very low and my orthopedic recommended I take a supplement. I just need to know which one, how much do I take? I did look things up online, states not to take it with Lipitor which I'm on so I don't know what to do.    Protocols used: Medication Question Call-Adult-[place]    "

## 2025-01-16 ENCOUNTER — PATIENT MESSAGE (OUTPATIENT)
Dept: CARDIOLOGY CLINIC | Facility: CLINIC | Age: 65
End: 2025-01-16

## 2025-01-20 ENCOUNTER — TELEPHONE (OUTPATIENT)
Age: 65
End: 2025-01-20

## 2025-01-20 NOTE — TELEPHONE ENCOUNTER
Pt called responding to a call from Keri about her MRI.. I could not locate anyone named Keri. I referred pt to the number provided attached to the appt which was associated with her MRI.

## 2025-01-21 ENCOUNTER — OFFICE VISIT (OUTPATIENT)
Dept: CARDIOLOGY CLINIC | Facility: CLINIC | Age: 65
End: 2025-01-21
Payer: COMMERCIAL

## 2025-01-21 VITALS
SYSTOLIC BLOOD PRESSURE: 98 MMHG | WEIGHT: 230 LBS | BODY MASS INDEX: 34.86 KG/M2 | HEART RATE: 73 BPM | HEIGHT: 68 IN | DIASTOLIC BLOOD PRESSURE: 56 MMHG

## 2025-01-21 DIAGNOSIS — I25.5 ISCHEMIC CARDIOMYOPATHY: Chronic | ICD-10-CM

## 2025-01-21 DIAGNOSIS — Z86.79 HISTORY OF SUSTAINED VENTRICULAR TACHYCARDIA: Primary | Chronic | ICD-10-CM

## 2025-01-21 DIAGNOSIS — I25.10 CORONARY ARTERY DISEASE INVOLVING NATIVE CORONARY ARTERY OF NATIVE HEART WITHOUT ANGINA PECTORIS: Chronic | ICD-10-CM

## 2025-01-21 DIAGNOSIS — E66.09 CLASS 1 OBESITY DUE TO EXCESS CALORIES WITH SERIOUS COMORBIDITY AND BODY MASS INDEX (BMI) OF 34.0 TO 34.9 IN ADULT: ICD-10-CM

## 2025-01-21 DIAGNOSIS — I48.92 ATRIAL FLUTTER, PAROXYSMAL (HCC): Chronic | ICD-10-CM

## 2025-01-21 DIAGNOSIS — E66.811 CLASS 1 OBESITY DUE TO EXCESS CALORIES WITH SERIOUS COMORBIDITY AND BODY MASS INDEX (BMI) OF 34.0 TO 34.9 IN ADULT: ICD-10-CM

## 2025-01-21 DIAGNOSIS — G47.33 OSA (OBSTRUCTIVE SLEEP APNEA): ICD-10-CM

## 2025-01-21 DIAGNOSIS — Z95.810 S/P ICD (INTERNAL CARDIAC DEFIBRILLATOR) PROCEDURE: Chronic | ICD-10-CM

## 2025-01-21 PROCEDURE — 99214 OFFICE O/P EST MOD 30 MIN: CPT

## 2025-01-21 PROCEDURE — 93000 ELECTROCARDIOGRAM COMPLETE: CPT

## 2025-01-23 ENCOUNTER — REMOTE DEVICE CLINIC VISIT (OUTPATIENT)
Dept: CARDIOLOGY CLINIC | Facility: CLINIC | Age: 65
End: 2025-01-23

## 2025-01-23 ENCOUNTER — RESULTS FOLLOW-UP (OUTPATIENT)
Dept: CARDIOLOGY CLINIC | Facility: CLINIC | Age: 65
End: 2025-01-23

## 2025-01-23 DIAGNOSIS — Z95.810 PRESENCE OF AUTOMATIC CARDIOVERTER/DEFIBRILLATOR (AICD): Primary | ICD-10-CM

## 2025-01-23 PROCEDURE — RECHECK: Performed by: INTERNAL MEDICINE

## 2025-01-23 NOTE — PROGRESS NOTES
Results for orders placed or performed in visit on 01/23/25   Cardiac EP device report    Narrative    MDT DUAL ICD (MVP ON) / ACTIVE SYSTEM IS MRI CONDITIONAL  CARELINK TRANSMISSION: N/B --MONTHLY EPISODE CHECK PER DR LACY VOLTAGE ADEQUATE. ( 12 YRS) AP 47%  <1%. ALL AVAILABLE LEAD PARAMETERS WITHIN NORMAL LIMITS. NO SIGNIFICANT HIGH RATE EPISODES. OPTI-VOL WITHIN NORMAL LIMITS. NORMAL DEVICE FUNCTION.---BRANDON

## 2025-01-28 ENCOUNTER — PATIENT MESSAGE (OUTPATIENT)
Dept: CARDIOLOGY CLINIC | Facility: CLINIC | Age: 65
End: 2025-01-28

## 2025-01-28 ENCOUNTER — HOSPITAL ENCOUNTER (EMERGENCY)
Facility: HOSPITAL | Age: 65
Discharge: HOME | End: 2025-01-28
Attending: EMERGENCY MEDICINE
Payer: OTHER MISCELLANEOUS

## 2025-01-28 ENCOUNTER — HOSPITAL ENCOUNTER (OUTPATIENT)
Dept: RADIOLOGY | Facility: HOSPITAL | Age: 65
Discharge: HOME | End: 2025-01-28
Payer: COMMERCIAL

## 2025-01-28 ENCOUNTER — TELEPHONE (OUTPATIENT)
Age: 65
End: 2025-01-28

## 2025-01-28 VITALS
DIASTOLIC BLOOD PRESSURE: 79 MMHG | TEMPERATURE: 98.6 F | SYSTOLIC BLOOD PRESSURE: 153 MMHG | RESPIRATION RATE: 18 BRPM | OXYGEN SATURATION: 98 % | HEIGHT: 68 IN | WEIGHT: 220 LBS | BODY MASS INDEX: 33.34 KG/M2 | HEART RATE: 75 BPM

## 2025-01-28 DIAGNOSIS — S32.009K PSEUDOARTHROSIS OF LUMBAR SPINE: ICD-10-CM

## 2025-01-28 DIAGNOSIS — M54.9 BACK PAIN, UNSPECIFIED BACK LOCATION, UNSPECIFIED BACK PAIN LATERALITY, UNSPECIFIED CHRONICITY: ICD-10-CM

## 2025-01-28 DIAGNOSIS — G89.29 ACUTE EXACERBATION OF CHRONIC LOW BACK PAIN: Primary | ICD-10-CM

## 2025-01-28 DIAGNOSIS — M54.50 ACUTE EXACERBATION OF CHRONIC LOW BACK PAIN: Primary | ICD-10-CM

## 2025-01-28 DIAGNOSIS — M48.061 FORAMINAL STENOSIS OF LUMBAR REGION: ICD-10-CM

## 2025-01-28 DIAGNOSIS — I25.5 ISCHEMIC CARDIOMYOPATHY: Chronic | ICD-10-CM

## 2025-01-28 DIAGNOSIS — M54.16 LUMBAR RADICULOPATHY: ICD-10-CM

## 2025-01-28 PROCEDURE — 76018 MR SAFETY IMPLANT ELEC PREPJ: CPT

## 2025-01-28 PROCEDURE — 99284 EMERGENCY DEPT VISIT MOD MDM: CPT | Performed by: EMERGENCY MEDICINE

## 2025-01-28 PROCEDURE — 99283 EMERGENCY DEPT VISIT LOW MDM: CPT

## 2025-01-28 PROCEDURE — 72148 MRI LUMBAR SPINE W/O DYE: CPT

## 2025-01-28 PROCEDURE — 96372 THER/PROPH/DIAG INJ SC/IM: CPT

## 2025-01-28 RX ORDER — SACUBITRIL AND VALSARTAN 49; 51 MG/1; MG/1
1 TABLET, FILM COATED ORAL 2 TIMES DAILY
Qty: 60 TABLET | Refills: 5 | Status: SHIPPED | OUTPATIENT
Start: 2025-01-28 | End: 2025-07-27

## 2025-01-28 RX ORDER — KETOROLAC TROMETHAMINE 30 MG/ML
30 INJECTION, SOLUTION INTRAMUSCULAR; INTRAVENOUS ONCE
Status: COMPLETED | OUTPATIENT
Start: 2025-01-28 | End: 2025-01-28

## 2025-01-28 RX ADMIN — KETOROLAC TROMETHAMINE 30 MG: 30 INJECTION, SOLUTION INTRAMUSCULAR; INTRAVENOUS at 21:16

## 2025-01-28 NOTE — PATIENT COMMUNICATION
Medication: Entresto    Dose/Frequency: 49-51 mg tablets; Take 1 tablet by mouth 2 (two) times a day     Quantity: 180, R 1    Pharmacy: [facility]/pharmacy #[id number] [place], [place] - [address] [place] [address]     Office:   [] PCP/Provider -   [x] Speciality/Provider - Dr. Bell    Does the patient have enough for 3 days?   [x] Yes   [] No - Send as HP to POD

## 2025-01-28 NOTE — TELEPHONE ENCOUNTER
Caller: Vesna     Doctor: Clementina     Reason for call: Patient went for her MRI and when she got home she sat down on the toilet and felt something snap in her back, then she had such pain it took her breath away. She has been having numbness in her left leg but now it seems worse and her right leg is getting numb also. She is  currently sitting in her car, please call patient to advise what she should do.     Call back#: [phone]      - On-call secure chat sent     -Per Dr. Mott patient advised to go to ER for Eval, She will be going to [facility].

## 2025-01-28 NOTE — NURSING NOTE
Device interrogation for MRI.  Normal device function prior to MRI.  Leads and device meet all requirements per policy for MRI.  Device programmed AOO 90bpm per Cardiology for MRI.  Patient has no complaints.  Vital signs monitored throughout by RUBÉN Butt RN.  Normal device function post MRI.  Device reprogrammed to prior settings per Cardiology.  
Pt ambulates with cane.  Flinqer device interrogation for MRI done by UZMA Borja, SARAH clinical specialist. Device set to MRI safe mode @  90 bpm.    MRI lumbar spine without contrast complete. Pt tolerated well  VSS throughout scan. Pt alert and oriented on room air. No complaints or visible signs of distress.  Device reprogrammed to prior settings per Cardiology by UZMA Borja RN.      
no

## 2025-01-29 ENCOUNTER — TELEPHONE (OUTPATIENT)
Age: 65
End: 2025-01-29

## 2025-01-29 NOTE — TELEPHONE ENCOUNTER
Caller: Patient     Doctor: Clementina     Reason for call: Patient states that she was looking over the notes from her last ov from 1/13. She said there are some things that need to be corrected:    Occupation: disabled-she said she would like it to state that she is unable to do her regular job  Living situation: Lives with family-she does NOT live with family, she lives alone   ADLs: patient is unable to perform ADL's such as shopping, cleaning, driving, etc-She said she IS able to do her own shopping with using 5lb lifting restriction, she IS able to do light cleaning, and she IS able to drive    Call back#: [phone]

## 2025-01-29 NOTE — PROGRESS NOTES
Name: Vesna Langston      : 1960      MRN: [id number]  Encounter Provider: Melissa Montoya DO  Encounter Date: 2025   Encounter department: [facility]  :  Assessment & Plan  Acute bilateral low back pain with bilateral sciatica  ER note reviewed  MRI Lumbar spine reviewed  Handicap form completed for patient    Patient has appt with spine surgery on Tuesday to discuss next steps. In the meantime, hydrocodone is too drowsy causing for patient. Will have her hold this and instead use prn tramadol 50mg for pain mgmt until she sees spine surgery next week.       Orders:    traMADol (ULTRAM) 50 mg tablet; Take 1 tablet (50 mg total) by mouth every 6 (six) hours as needed for moderate pain or severe pain      Return if symptoms worsen or fail to improve.       History of Present Illness   Chief Complaint   Patient presents with    Follow-up      ED f/u, low back pain     Patient presetns fro ER follow up. She was in the ED at [facility] in  on  for back pain. She reprots she had sat down on the toilet and felt a snap in her back. She called Dr. Mcrae (back surgery) who told her to go to the ER for eval. She did so. She vomited from the pain being so bad.    She used her walker to get herself ot the ER. Earlier that day she had an MRI of her back (pre-surgical planned). ER gave her toradol 30mg IM and told her when she got home to take 2 hydrocodone. This caused her to sleep for 10 hours. Yesterday she woke up still in pain. She called her back surgeon and told her to make no changes in meds and see them in the office on  as schedule. She is now starting to have bladder leakage but they still just told her to come in on Tuesday as scheduled. She happened to have some leftover tramadol from the past, 50mg (only had 2 pills left) and took these yesterday which gave good pain relief without the drowsiness as the hydrocodone did.       Back Pain    Review of Systems    Genitourinary:  Positive for urgency.   Musculoskeletal:  Positive for back pain.       Objective   /57 (BP Location: Right arm, Patient Position: Sitting, Cuff Size: Standard)   Pulse 75   Wt 103 kg (227 lb 15.3 oz)   SpO2 98%   BMI 34.66 kg/m²      Physical Exam  Vitals reviewed.   Constitutional:       General: She is in acute distress.   Neurological:      Gait: Gait abnormal.      Comments: Walking with cane       Administrative Statements   I have spent a total time of 30 minutes in caring for this patient on the day of the visit/encounter including Risks and benefits of tx options, Instructions for management, Patient and family education, Importance of tx compliance, Counseling / Coordination of care, Documenting in the medical record, Reviewing / ordering tests, medicine, procedures  , and Obtaining or reviewing history  . Topics discussed with the patient / family include symptom assessment and management, medication adjustment, and opioid titration.

## 2025-01-29 NOTE — ED PROVIDER NOTES
Time reflects when diagnosis was documented in both MDM as applicable and the Disposition within this note       Time User Action Codes Description Comment    1/28/2025  9:06 PM Bryan Puckett Add [M54.50,  G89.29] Acute exacerbation of chronic low back pain     1/28/2025  9:07 PM Bryan Puckett Add [M48.061] Foraminal stenosis of lumbar region           ED Disposition       ED Disposition   Discharge    Condition   Stable    Date/Time   Tue Jan 28, 2025  9:06 PM    Comment   Vesna Langston discharge to home/self care.                   Assessment & Plan       Medical Decision Making  Differential diagnosis included but not limited to lumbar fracture dislocation exacerbation of chronic lower back pain sprain strain.  Recent MRI results reviewed and discussed with patient no serious or significant emergent abnormal findings. patient is clinically hemodynamically neurologically stable in the emergency department no alarm features of back pain present to necessitate emergent arbitrary despite this time for now provided with intramuscular inflammatory in the ED and advised rest and supportive care and prompt follow-up with primary physician for further evaluation and treatment and obtain test results.  Return precautions and anticipatory guidance discussed.      Problems Addressed:  Acute exacerbation of chronic low back pain: acute illness or injury  Foraminal stenosis of lumbar region: acute illness or injury    Risk  Prescription drug management.             Medications   ketorolac (TORADOL) injection 30 mg (30 mg Intramuscular Given 1/28/25 2116)       ED Risk Strat Scores                          SBIRT 22yo+      Flowsheet Row Most Recent Value   Initial Alcohol Screen: US AUDIT-C     1. How often do you have a drink containing alcohol? 0 Filed at: 01/28/2025 2024   2. How many drinks containing alcohol do you have on a typical day you are drinking?  0 Filed at: 01/28/2025 2024   3a. Male UNDER 65: How often do you have  five or more drinks on one occasion? 0 Filed at: 01/28/2025 2024   3b. FEMALE Any Age, or MALE 65+: How often do you have 4 or more drinks on one occassion? 0 Filed at: 01/28/2025 2024   Audit-C Score 0 Filed at: 01/28/2025 2024   CHRIS: How many times in the past year have you...    Used an illegal drug or used a prescription medication for non-medical reasons? Never Filed at: 01/28/2025 2024                            History of Present Illness       Chief Complaint   Patient presents with    Back Pain     Pt having continued back pain for which she needs surgery. Pt had presurgical MRI today. Today she was on the toilet and felt something snap in her back about 30 min PTA. Pt endorses severe back pain since then. Pt took no additional meds for this pain.        Past Medical History:   Diagnosis Date    Acute respiratory insufficiency 03/22/2024    Allergic     Chronic HFrEF (heart failure with reduced ejection fraction) (HCC)     Coronary artery disease     COVID-19 virus infection 11/28/2022    Diabetes mellitus (HCC)     Disease of thyroid gland 4/09/2024    Heart disease 3/24    Hyperlipidemia     Hypoglycemia     ICD (implantable cardioverter-defibrillator) in place     Ischemic cardiomyopathy     Lactic acidosis 03/22/2024    Myocardial infarction (HCC) 3/22/2024    Otitis media 1966    Seasonal allergies     ST elevation myocardial infarction involving left anterior descending (LAD) coronary artery (HCC) 03/22/2024    Tobacco abuse     Visual impairment 1967      Past Surgical History:   Procedure Laterality Date    ADENOIDECTOMY      APPENDECTOMY      APPENDECTOMY LAPAROSCOPIC N/A 05/08/2024    Procedure: APPENDECTOMY LAPAROSCOPIC;  Surgeon: Lauryn Ullrich, DO;  Location: [facility];  Service: General    BACK SURGERY  8/23    BREAST EXCISIONAL BIOPSY Right 09/2000    cyst removed- benign    CARDIAC CATHETERIZATION N/A 03/22/2024    Procedure: Cardiac pci;  Surgeon: Gabriel Myers MD;  [facility];  Service: Cardiology    CARDIAC CATHETERIZATION N/A 2024    Procedure: Cardiac Coronary Angiogram;  Surgeon: Gabriel Myers MD;  [facility];  Service: Cardiology    CARDIAC CATHETERIZATION N/A 2024    Procedure: Cardiac PCI AMI;  Surgeon: Gabriel Myers MD;  [facility];  Service: Cardiology    CARDIAC CATHETERIZATION N/A 2024    Procedure: Cardiac IVUS;  Surgeon: Gabriel Myers MD;  [facility];  Service: Cardiology    CARDIAC DEFIBRILLATOR PLACEMENT      CARDIAC ELECTROPHYSIOLOGY PROCEDURE N/A 2024    Procedure: Cardiac icd implant;  Surgeon: Jeffy Lama MD;  [facility];  Service: Cardiology     SECTION      COLONOSCOPY      ECTOPIC PREGNANCY SURGERY      INSERT / REPLACE / REMOVE PACEMAKER      MASS EXCISION Left 10/18/2024    Procedure: EXCISION BIOPSY TISSUE LESION/MASS UPPER EXTREMITY - Left index finger;  Surgeon: Leandro Campos MD;  Location:  MAIN OR;  Service: Orthopedics    SEPTOPLASTY      SPINE SURGERY  23    TONSILLECTOMY        Family History   Adopted: Yes   Problem Relation Age of Onset    Depression Mother     Heart disease Father     OCD Daughter       Social History     Tobacco Use    Smoking status: Former     Current packs/day: 0.00     Average packs/day: 1 pack/day for 24.2 years (24.2 ttl pk-yrs)     Types: Cigarettes     Start date: 2000     Quit date: 3/22/2024     Years since quittin.8     Passive exposure: Never    Smokeless tobacco: Never    Tobacco comments:     Smoked 0.5-1 ppd; quit in 2024.    Vaping Use    Vaping status: Never Used   Substance Use Topics    Alcohol use: Never     Alcohol/week: 1.0 standard drink of alcohol     Types: 1 Shots of liquor per week     Comment: Every 2or 3months    Drug use: Never      E-Cigarette/Vaping    E-Cigarette Use Never User       E-Cigarette/Vaping Substances    Nicotine No     THC No     CBD No     Flavoring No      Other No     Unknown No       I have reviewed and agree with the history as documented.     Patient a 64-year-old female with history of chronic back pain prior laminectomy presents emergency department complaining of acute exacerbation of lower back pain after feeling a pop while sitting on the toilet today.  No fall or trauma or injury no loss of bladder or bowel or urinary retention no numbness or weakness in the legs.  Patient had outpatient MRI of the spine today did not yet get results.  Patient does follow with a pain and spine specialist.      History provided by:  Patient      Review of Systems   Genitourinary:  Negative for difficulty urinating.   Musculoskeletal:  Positive for back pain.   Neurological:  Negative for weakness and numbness.   All other systems reviewed and are negative.          Objective       ED Triage Vitals   Temperature Pulse Blood Pressure Respirations SpO2 Patient Position - Orthostatic VS   01/28/25 1923 01/28/25 1923 01/28/25 1923 01/28/25 1923 01/28/25 1923 01/28/25 1923   98.6 °F (37 °C) 75 153/79 18 98 % Sitting      Temp Source Heart Rate Source BP Location FiO2 (%) Pain Score    01/28/25 1923 01/28/25 1923 01/28/25 1923 -- 01/28/25 2116    Temporal Monitor Left arm  9      Vitals      Date and Time Temp Pulse SpO2 Resp BP Pain Score FACES Pain Rating User   01/28/25 2116 -- -- -- -- -- 9 -- LAK   01/28/25 1923 98.6 °F (37 °C) 75 98 % 18 153/79 -- -- RG            Physical Exam  Vitals and nursing note reviewed.   Constitutional:       General: She is not in acute distress.     Appearance: Normal appearance.   HENT:      Head: Normocephalic and atraumatic.      Nose: Nose normal.   Eyes:      Conjunctiva/sclera: Conjunctivae normal.   Pulmonary:      Effort: Pulmonary effort is normal. No respiratory distress.   Musculoskeletal:      Lumbar back: Spasms and tenderness present.   Skin:     General: Skin is dry.   Neurological:      General: No focal deficit present.      Mental  Status: She is alert and oriented to person, place, and time.         Results Reviewed       None            No orders to display       Procedures  MRI lumbar spine wo contrast  Result Date: 1/28/2025  Narrative: MRI LUMBAR SPINE WITHOUT CONTRAST INDICATION: S32.009K: Unspecified fracture of unspecified lumbar vertebra, subsequent encounter for fracture with nonunion M54.9: Dorsalgia, unspecified M54.16: Radiculopathy, lumbar region. COMPARISON: Lumbar spine radiographs from 1/14/2025, CT abdomen pelvis from 5/7/2024 TECHNIQUE:  Multiplanar, multisequence imaging of the lumbar spine was performed. . IMAGE QUALITY:  Diagnostic FINDINGS: VERTEBRAL BODIES:  There are 5 lumbar type vertebral bodies. Grade 1 anterolisthesis of L4 on L5 again demonstrated. Postsurgical changes from prior posterior lumbar spinal fusion procedures at L3-S1, with bilateral transpedicular screws, vertical rods. Laminectomies at L3-S1 again demonstrated. No compression fractures identified. Normal marrow signal is identified within the visualized bony structures.  No discrete marrow lesion. SACRUM:  Normal signal within the sacrum. No evidence of insufficiency or stress fracture. DISTAL CORD AND CONUS:  Normal size and signal within the distal cord and conus. Conus terminates at T12-L1. PARASPINAL SOFT TISSUES:  Paraspinal soft tissues are unremarkable. LOWER THORACIC DISC SPACES:  Normal disc height and signal.  No disc herniation, canal stenosis or foraminal narrowing. LUMBAR DISC SPACES: L1-L2:  Normal. L2-L3: Mild bilateral facet arthropathy. Otherwise normal. L3-L4: Posterior fusion at this level, with laminectomies. Disc bulge with likely superimposed left central and subarticular disc protrusion. No canal stenosis. Mild left subarticular zone narrowing. Mild bilateral foraminal stenosis. L4-L5: Anterolisthesis of L4 on L5. This level is fused posteriorly with laminectomies. Mild disc bulge. No canal stenosis. No foraminal stenosis  identified. L5-S1: This level is fused posteriorly with laminectomies. Disc height loss, disc osteophyte complex. Bilateral facet arthropathy. No canal stenosis. No foraminal stenosis identified. OTHER FINDINGS: Lower pole left renal cyst for which no further imaging evaluation or follow-up is needed.     Impression: Prior posterior lumbar spinal fusion procedure from L3-S1, with laminectomies at these levels. Multilevel lumbar spondylosis, as described above, without canal stenosis, but there is mild left subarticular zone narrowing and mild bilateral foraminal stenosis at L3-L4 due to a left-sided disc protrusion at this level. Workstation performed: [id number]           ED Medication and Procedure Management   Prior to Admission Medications   Prescriptions Last Dose Informant Patient Reported? Taking?   Empagliflozin (Jardiance) 25 MG TABS   No No   Sig: Take 1 tablet (25 mg total) by mouth every morning   HYDROcodone-acetaminophen (NORCO) 5-325 mg per tablet  Self No No   Sig: Take 1 tablet by mouth every 6 (six) hours as needed for pain for up to 10 doses Max Daily Amount: 4 tablets   albuterol (ProAir HFA) 90 mcg/act inhaler  Self No No   Sig: Inhale 2 puffs every 6 (six) hours as needed for wheezing   apixaban (ELIQUIS) 5 mg  Self No No   Sig: Take 1 tablet (5 mg total) by mouth 2 (two) times a day   atorvastatin (LIPITOR) 80 mg tablet   No No   Sig: TAKE 1 TABLET BY MOUTH EVERY DAY IN THE EVENING   clopidogrel (PLAVIX) 75 mg tablet  Self No No   Sig: Take 1 tablet (75 mg total) by mouth daily   diphenhydrAMINE (BENADRYL) 25 mg capsule  Self Yes No   Sig: Take 25 mg by mouth every 6 (six) hours as needed for itching   dofetilide (TIKOSYN) 125 mcg capsule  Self No No   Sig: Take 1 capsule (125 mcg total) by mouth every 12 (twelve) hours   ergocalciferol (VITAMIN D2) 50,000 units   No No   Sig: Take 1 capsule (50,000 Units total) by mouth once a week   fluticasone (FLONASE) 50 mcg/act nasal spray  Self No No    Si spray into each nostril daily   furosemide (LASIX) 40 mg tablet  Self No No   Sig: Take 1 tablet (40 mg total) by mouth 2 (two) times a day   gabapentin (Neurontin) 100 mg capsule  Self No No   Sig: Take 1 capsule (100 mg total) by mouth 2 (two) times a day   glimepiride (AMARYL) 1 mg tablet  Self No No   Sig: Take 1 tablet (1 mg total) by mouth daily with breakfast   isosorbide mononitrate (IMDUR) 30 mg 24 hr tablet   No No   Sig: Take 1 tablet (30 mg total) by mouth 2 (two) times a day 30 mg in the am and 30 mg in the pm   levothyroxine (Synthroid) 75 mcg tablet  Self No No   Sig: Take 1 tablet (75 mcg total) by mouth daily   meclizine (ANTIVERT) 25 mg tablet  Self No No   Sig: Take 1 tablet (25 mg total) by mouth every 8 (eight) hours as needed for dizziness   metoprolol succinate (TOPROL-XL) 25 mg 24 hr tablet  Self No No   Sig: Take 2 tablets (50 mg total) by mouth daily at bedtime   nitroglycerin (NITROSTAT) 0.4 mg SL tablet  Self No No   Sig: Place 1 tablet (0.4 mg total) under the tongue every 5 (five) minutes as needed for chest pain   pantoprazole (PROTONIX) 40 mg tablet  Self No No   Sig: TAKE 1 TABLET BY MOUTH 2 TIMES A DAY BEFORE MEALS.   potassium chloride (Klor-Con M20) 20 mEq tablet  Self No No   Sig: TAKE 1 TABLET BY MOUTH EVERY DAY   sacubitril-valsartan (Entresto) 49-51 MG TABS   No No   Sig: Take 1 tablet by mouth 2 (two) times a day   semaglutide, 0.25 or 0.5 mg/dose, (Ozempic, 0.25 or 0.5 MG/DOSE,) 2 mg/3 mL injection pen   No No   Sig: Inject 0.375 mL (0.25 mg total) under the skin every 7 days for 28 days, THEN 0.75 mL (0.5 mg total) every 7 days for 28 days.   semaglutide, 1 mg/dose, (Ozempic) 4 mg/3 mL injection pen  Self No No   Sig: Inject 0.75 mL (1 mg total) under the skin once a week for 28 days Do not start before 2025.   Patient not taking: Reported on 1/3/2025 Do not start before 2025.   semaglutide, 2 mg/dose, (Ozempic) 8 mg/ mL injection pen   Self No No   Sig: Inject 0.75 mL (2 mg total) under the skin every 7 days Do not start before March 17, 2025.   Patient not taking: Reported on 1/3/2025 Do not start before March 17, 2025.      Facility-Administered Medications: None     Discharge Medication List as of 1/28/2025  9:07 PM        CONTINUE these medications which have NOT CHANGED    Details   albuterol (ProAir HFA) 90 mcg/act inhaler Inhale 2 puffs every 6 (six) hours as needed for wheezing, Starting Wed 5/1/2024, Normal      apixaban (ELIQUIS) 5 mg Take 1 tablet (5 mg total) by mouth 2 (two) times a day, Starting Wed 10/23/2024, Until Mon 4/21/2025, Normal      atorvastatin (LIPITOR) 80 mg tablet TAKE 1 TABLET BY MOUTH EVERY DAY IN THE EVENING, Starting Wed 1/15/2025, Normal      clopidogrel (PLAVIX) 75 mg tablet Take 1 tablet (75 mg total) by mouth daily, Starting Tue 12/24/2024, Normal      diphenhydrAMINE (BENADRYL) 25 mg capsule Take 25 mg by mouth every 6 (six) hours as needed for itching, Historical Med      dofetilide (TIKOSYN) 125 mcg capsule Take 1 capsule (125 mcg total) by mouth every 12 (twelve) hours, Starting Tue 12/10/2024, Normal      Empagliflozin (Jardiance) 25 MG TABS Take 1 tablet (25 mg total) by mouth every morning, Starting Fri 1/3/2025, Until Sat 6/27/2026, Normal      ergocalciferol (VITAMIN D2) 50,000 units Take 1 capsule (50,000 Units total) by mouth once a week, Starting Wed 1/15/2025, Normal      fluticasone (FLONASE) 50 mcg/act nasal spray 1 spray into each nostril daily, Starting Wed 11/20/2024, Normal      furosemide (LASIX) 40 mg tablet Take 1 tablet (40 mg total) by mouth 2 (two) times a day, Starting Tue 12/3/2024, Until Wed 5/27/2026, Normal      gabapentin (Neurontin) 100 mg capsule Take 1 capsule (100 mg total) by mouth 2 (two) times a day, Starting u 3/28/2024, No Print      glimepiride (AMARYL) 1 mg tablet Take 1 tablet (1 mg total) by mouth daily with breakfast, Starting Mon 10/7/2024, Until Thu 10/2/2025,  Normal      HYDROcodone-acetaminophen (NORCO) 5-325 mg per tablet Take 1 tablet by mouth every 6 (six) hours as needed for pain for up to 10 doses Max Daily Amount: 4 tablets, Starting Sun 5/12/2024, Normal      isosorbide mononitrate (IMDUR) 30 mg 24 hr tablet Take 1 tablet (30 mg total) by mouth 2 (two) times a day 30 mg in the am and 30 mg in the pm, Starting Tue 1/7/2025, Until Wed 7/1/2026, Normal      levothyroxine (Synthroid) 75 mcg tablet Take 1 tablet (75 mcg total) by mouth daily, Starting Thu 8/15/2024, Normal      meclizine (ANTIVERT) 25 mg tablet Take 1 tablet (25 mg total) by mouth every 8 (eight) hours as needed for dizziness, Starting Sat 9/11/2021, Normal      metoprolol succinate (TOPROL-XL) 25 mg 24 hr tablet Take 2 tablets (50 mg total) by mouth daily at bedtime, Starting Thu 5/30/2024, Until Fri 11/21/2025, Normal      nitroglycerin (NITROSTAT) 0.4 mg SL tablet Place 1 tablet (0.4 mg total) under the tongue every 5 (five) minutes as needed for chest pain, Starting Tue 12/17/2024, Until Thu 1/16/2025 at 2359, Normal      pantoprazole (PROTONIX) 40 mg tablet TAKE 1 TABLET BY MOUTH 2 TIMES A DAY BEFORE MEALS., Starting Thu 12/12/2024, Normal      potassium chloride (Klor-Con M20) 20 mEq tablet TAKE 1 TABLET BY MOUTH EVERY DAY, Starting Thu 12/26/2024, Normal      sacubitril-valsartan (Entresto) 49-51 MG TABS Take 1 tablet by mouth 2 (two) times a day, Starting Tue 1/28/2025, Until Sun 7/27/2025, Normal      semaglutide, 0.25 or 0.5 mg/dose, (Ozempic, 0.25 or 0.5 MG/DOSE,) 2 mg/3 mL injection pen Multiple Dosages:Starting Wed 1/8/2025, Until Tue 2/4/2025 at 2359, THEN Starting Wed 2/5/2025, Until Tue 3/4/2025 at 2359Inject 0.375 mL (0.25 mg total) under the skin every 7 days for 28 days, THEN 0.75 mL (0.5 mg total) every 7 days for 28 days., N ormal      semaglutide, 1 mg/dose, (Ozempic) 4 mg/3 mL injection pen Inject 0.75 mL (1 mg total) under the skin once a week for 28 days Do not start before  February 17, 2025., Starting Mon 2/17/2025, Until Mon 3/17/2025, Normal      semaglutide, 2 mg/dose, (Ozempic) 8 mg/ mL injection pen Inject 0.75 mL (2 mg total) under the skin every 7 days Do not start before March 17, 2025., Starting Mon 3/17/2025, Normal           No discharge procedures on file.  ED SEPSIS DOCUMENTATION   Time reflects when diagnosis was documented in both MDM as applicable and the Disposition within this note       Time User Action Codes Description Comment    1/28/2025  9:06 PM Bryan Puckett Add [M54.50,  G89.29] Acute exacerbation of chronic low back pain     1/28/2025  9:07 PM Bryan Puckett [M48.061] Foraminal stenosis of lumbar region                  Bryan Puckett DO  01/29/25 0026

## 2025-01-30 ENCOUNTER — OFFICE VISIT (OUTPATIENT)
Dept: FAMILY MEDICINE CLINIC | Facility: CLINIC | Age: 65
End: 2025-01-30
Payer: COMMERCIAL

## 2025-01-30 VITALS
OXYGEN SATURATION: 98 % | WEIGHT: 227.96 LBS | SYSTOLIC BLOOD PRESSURE: 119 MMHG | BODY MASS INDEX: 34.66 KG/M2 | HEART RATE: 75 BPM | DIASTOLIC BLOOD PRESSURE: 57 MMHG

## 2025-01-30 DIAGNOSIS — M54.41 ACUTE BILATERAL LOW BACK PAIN WITH BILATERAL SCIATICA: Primary | ICD-10-CM

## 2025-01-30 DIAGNOSIS — M54.42 ACUTE BILATERAL LOW BACK PAIN WITH BILATERAL SCIATICA: Primary | ICD-10-CM

## 2025-01-30 LAB
DME PARACHUTE DELIVERY DATE ACTUAL: NORMAL
DME PARACHUTE DELIVERY DATE REQUESTED: NORMAL
DME PARACHUTE DELIVERY NOTE: NORMAL
DME PARACHUTE ITEM DESCRIPTION: NORMAL
DME PARACHUTE ORDER STATUS: NORMAL
DME PARACHUTE SUPPLIER NAME: NORMAL
DME PARACHUTE SUPPLIER PHONE: NORMAL

## 2025-01-30 PROCEDURE — 99214 OFFICE O/P EST MOD 30 MIN: CPT | Performed by: FAMILY MEDICINE

## 2025-01-30 RX ORDER — TRAMADOL HYDROCHLORIDE 50 MG/1
50 TABLET ORAL EVERY 6 HOURS PRN
Qty: 20 TABLET | Refills: 0 | Status: SHIPPED | OUTPATIENT
Start: 2025-01-30

## 2025-01-30 NOTE — ASSESSMENT & PLAN NOTE
ER note reviewed  MRI Lumbar spine reviewed  Handicap form completed for patient    Patient has appt with spine surgery on Tuesday to discuss next steps. In the meantime, hydrocodone is too drowsy causing for patient. Will have her hold this and instead use prn tramadol 50mg for pain mgmt until she sees spine surgery next week.       Orders:    traMADol (ULTRAM) 50 mg tablet; Take 1 tablet (50 mg total) by mouth every 6 (six) hours as needed for moderate pain or severe pain

## 2025-02-02 ENCOUNTER — PATIENT MESSAGE (OUTPATIENT)
Dept: SLEEP CENTER | Facility: CLINIC | Age: 65
End: 2025-02-02

## 2025-02-03 ENCOUNTER — PATIENT MESSAGE (OUTPATIENT)
Dept: SLEEP CENTER | Facility: CLINIC | Age: 65
End: 2025-02-03

## 2025-02-04 ENCOUNTER — HOSPITAL ENCOUNTER (OUTPATIENT)
Dept: RADIOLOGY | Facility: HOSPITAL | Age: 65
Discharge: HOME | End: 2025-02-04
Payer: COMMERCIAL

## 2025-02-04 ENCOUNTER — REMOTE DEVICE CLINIC VISIT (OUTPATIENT)
Dept: CARDIOLOGY CLINIC | Facility: CLINIC | Age: 65
End: 2025-02-04
Payer: COMMERCIAL

## 2025-02-04 ENCOUNTER — RESULTS FOLLOW-UP (OUTPATIENT)
Dept: CARDIOLOGY CLINIC | Facility: CLINIC | Age: 65
End: 2025-02-04

## 2025-02-04 ENCOUNTER — TELEPHONE (OUTPATIENT)
Age: 65
End: 2025-02-04

## 2025-02-04 ENCOUNTER — OFFICE VISIT (OUTPATIENT)
Dept: OBGYN CLINIC | Facility: HOSPITAL | Age: 65
End: 2025-02-04
Payer: OTHER MISCELLANEOUS

## 2025-02-04 VITALS — WEIGHT: 227.07 LBS | HEIGHT: 68 IN | BODY MASS INDEX: 34.41 KG/M2

## 2025-02-04 DIAGNOSIS — Z01.818 PRE-OP EVALUATION: ICD-10-CM

## 2025-02-04 DIAGNOSIS — Z95.810 AICD (AUTOMATIC CARDIOVERTER/DEFIBRILLATOR) PRESENT: Primary | ICD-10-CM

## 2025-02-04 DIAGNOSIS — M54.16 RADICULOPATHY, LUMBAR REGION: ICD-10-CM

## 2025-02-04 DIAGNOSIS — Z98.1 HISTORY OF LUMBAR SPINAL FUSION: Primary | ICD-10-CM

## 2025-02-04 DIAGNOSIS — Z98.1 HISTORY OF LUMBAR SPINAL FUSION: ICD-10-CM

## 2025-02-04 PROCEDURE — 71046 X-RAY EXAM CHEST 2 VIEWS: CPT

## 2025-02-04 PROCEDURE — 99215 OFFICE O/P EST HI 40 MIN: CPT | Performed by: ORTHOPAEDIC SURGERY

## 2025-02-04 PROCEDURE — 93296 REM INTERROG EVL PM/IDS: CPT | Performed by: INTERNAL MEDICINE

## 2025-02-04 PROCEDURE — 72083 X-RAY EXAM ENTIRE SPI 4/5 VW: CPT

## 2025-02-04 PROCEDURE — 93295 DEV INTERROG REMOTE 1/2/MLT: CPT | Performed by: INTERNAL MEDICINE

## 2025-02-04 RX ORDER — TRANEXAMIC ACID 10 MG/ML
1000 INJECTION, SOLUTION INTRAVENOUS ONCE
OUTPATIENT
Start: 2025-02-04 | End: 2025-02-04

## 2025-02-04 RX ORDER — CEFAZOLIN SODIUM 2 G/50ML
2000 SOLUTION INTRAVENOUS ONCE
OUTPATIENT
Start: 2025-02-04 | End: 2025-02-04

## 2025-02-04 RX ORDER — CHLORHEXIDINE GLUCONATE ORAL RINSE 1.2 MG/ML
15 SOLUTION DENTAL ONCE
OUTPATIENT
Start: 2025-02-04 | End: 2025-02-04

## 2025-02-04 NOTE — TELEPHONE ENCOUNTER
Caller: Vesna    Doctor: Dr. Mcrae     Reason for call: Patient is requesting a work excuse from today until surgery that is scheduled July 3,2025. Patient is asking note to be emailed to [email or web address] as well as be uploaded into [employer].     Call back#: [phone]

## 2025-02-04 NOTE — PROGRESS NOTES
Results for orders placed or performed in visit on 02/04/25   Cardiac EP device report    Narrative    MDT DUAL ICD (MVP ON) / ACTIVE SYSTEM IS MRI CONDITIONAL  CARELINK TRANSMISSION: BATTERY VOLTAGE ADEQUATE (12 YRS). AP-47%, <0.1%. ALL AVAILABLE LEAD PARAMETERS WITHIN NORMAL LIMITS. NO SIGNIFICANT HIGH RATE EPISODES. OPTI-VOL WITHIN NORMAL LIMITS. NORMAL DEVICE FUNCTION. GV

## 2025-02-05 DIAGNOSIS — I48.92 ATRIAL FLUTTER, UNSPECIFIED TYPE (HCC): ICD-10-CM

## 2025-02-05 NOTE — TELEPHONE ENCOUNTER
Note was placed in pt's chart. Pt can access this note through [employer]. We are not able to email the note.

## 2025-02-05 NOTE — TELEPHONE ENCOUNTER
Caller: Patient     Doctor: Dr. Mcrae    Reason for call: Patient stated she will need a follow up appt in 90 days because this is a workers comp case. Follow up was scheduled on 4/28/25.    She will need a letter from the doctor stating that she has a follow up on 4/28/25 and her surgery is on 7/3/25. Please upload to her [employer].     Call back#: [phone]

## 2025-02-07 ENCOUNTER — TELEPHONE (OUTPATIENT)
Age: 65
End: 2025-02-07

## 2025-02-07 ENCOUNTER — CONSULT (OUTPATIENT)
Dept: PAIN MEDICINE | Facility: CLINIC | Age: 65
End: 2025-02-07
Payer: OTHER MISCELLANEOUS

## 2025-02-07 VITALS — BODY MASS INDEX: 33.83 KG/M2 | WEIGHT: 223.2 LBS | HEIGHT: 68 IN

## 2025-02-07 DIAGNOSIS — I25.10 CORONARY ARTERY DISEASE INVOLVING NATIVE CORONARY ARTERY OF NATIVE HEART WITHOUT ANGINA PECTORIS: Chronic | ICD-10-CM

## 2025-02-07 DIAGNOSIS — E66.811 CLASS 1 OBESITY DUE TO EXCESS CALORIES WITH SERIOUS COMORBIDITY AND BODY MASS INDEX (BMI) OF 34.0 TO 34.9 IN ADULT: ICD-10-CM

## 2025-02-07 DIAGNOSIS — G47.33 OSA (OBSTRUCTIVE SLEEP APNEA): ICD-10-CM

## 2025-02-07 DIAGNOSIS — M54.16 RADICULOPATHY, LUMBAR REGION: ICD-10-CM

## 2025-02-07 DIAGNOSIS — E66.09 CLASS 1 OBESITY DUE TO EXCESS CALORIES WITH SERIOUS COMORBIDITY AND BODY MASS INDEX (BMI) OF 34.0 TO 34.9 IN ADULT: ICD-10-CM

## 2025-02-07 DIAGNOSIS — E78.2 MIXED HYPERLIPIDEMIA: ICD-10-CM

## 2025-02-07 DIAGNOSIS — Z98.1 HISTORY OF LUMBAR SPINAL FUSION: ICD-10-CM

## 2025-02-07 DIAGNOSIS — E11.9 TYPE 2 DIABETES MELLITUS WITHOUT COMPLICATION, WITHOUT LONG-TERM CURRENT USE OF INSULIN (HCC): ICD-10-CM

## 2025-02-07 PROCEDURE — 99244 OFF/OP CNSLTJ NEW/EST MOD 40: CPT | Performed by: ANESTHESIOLOGY

## 2025-02-07 RX ORDER — FUROSEMIDE 20 MG/1
TABLET ORAL
COMMUNITY
Start: 2025-01-30

## 2025-02-07 RX ORDER — PREGABALIN 50 MG/1
50 CAPSULE ORAL 3 TIMES DAILY
Qty: 90 CAPSULE | Refills: 2 | Status: SHIPPED | OUTPATIENT
Start: 2025-02-07

## 2025-02-07 NOTE — LETTER
February 9, 2025     Monserrat Beltran PA-C  [address] [facility] [id number]    Patient: Vesna Langston   YOB: 1960   Date of Visit: 2/7/2025       Dear Dr. Beltran:    Thank you for referring Vesna Langston to me for evaluation. Below are my notes for this consultation.    If you have questions, please do not hesitate to call me. I look forward to following your patient along with you.         Sincerely,        Maxime Cameron MD        CC: No Recipients    Maxime Cameron MD  2/7/2025  9:29 PM  Signed      Assessment  1. History of lumbar spinal fusion  -     Ambulatory referral to Spine & Pain Management  2. Radiculopathy, lumbar region  -     Ambulatory referral to Spine & Pain Management    Axial low back pain described primarily by arthritic features.  Aching, nagging, indolent, stabbing, throbbing features in axial low back with left greater than right L3 radicular components. Physical exam showing weakness of the left lower extremity and ambulatory dysfunction. Positive facet loading maneuvers in lumbar spine elicited pain, positive tenderness to palpation over lumbar paraspinal muscles.  Findings correlate with prominent lumbar facet arthropathy seen throughout axial low back on imaging studies.Loosening of hardware noted by lucency on MRI lumbar spine. Currently she is neurologically intact without gait instability, saddle anesthesia or bowel/bladder abnormality. Scheduled for ortho spine intervention for ongoing symptoms related to pseudoarthrosis. Risks, benefits and alternatives to ALICIA vs med management thoroughly discussed with patient given multiple medical comorbidity.  Handouts provided questions answered to patient satisfaction.    Plan  -f/u 4 weeks  -gabapentin transitioned to lyrica 50 mg t.i.d. Ordered for patient; counseled regarding sedative effects of taking this medication and provided up titration calendar.  Counseled not to take medication while driving or  operating heavy machinery/using stairs  -has been particpant with formal physical therapy for lumbar radiculopathy; Physician directed home exercise plan as per [employer] demonstrated and handouts provided that patient plans to participate with for 1 hour, twice a week for the next 6 weeks.     There are risks associated with opioid medications, including dependence, addiction and tolerance. The patient understands and agrees to use these medications only as prescribed. Potential side effects of the medications include, but are not limited to, constipation, drowsiness, addiction, impaired judgment and risk of fatal overdose if not taken as prescribed. The patient was warned against driving while taking sedation medications or operating heavy machinery. The patient voiced understanding. Sharing medications is a felony. At this point in time, the patient is showing no signs of addiction, abuse, diversion or suicidal ideation.     [place] Prescription Drug Monitoring Program report was reviewed and was appropriate      Complete risks and benefits including bleeding, infection, tissue reaction, nerve injury and allergic reaction were discussed. The approach was demonstrated using models and literature was provided. Verbal and written consent was obtained.     My impressions and treatment recommendations were discussed in detail with the patient who verbalized understanding and had no further questions.  Discharge instructions were provided. I personally saw and examined the patient and I agree with the above discussed plan of care.                                               Review of external notes including PT notes, PCP notes and specialist notes was performed at this visit in addition to review of new ordered imaging and past imaging to develop or modify multidisciplinary pain plan    New Medications Ordered This Visit   Medications   • furosemide (LASIX) 20 mg tablet     Sig: PLEASE SEE ATTACHED FOR DETAILED  DIRECTIONS       History of Present Illness    Vesna Langston is a 64 y.o. female with pmhx of CAD with ICD/pacemaker, obesity, prior L3-S1 fusion, on plavix and eliquis, DM-2 on insulin, CKD-4, presenting for initial visit with chief complaint of low back pain - referred by Dr. Mcrae for treatement options. Prior to her fusion she was treated by Dr. Ko and had good response to ESIs/Ablation in past. Pain began 2 years ago after an injury at work. She received a fusion of L3-S1 in 2023.  2 weeks after she return to work she suffered a twisting injury which caused her to return to her surgeon.  X-ray revealed loosening of multiple screws in her lumbar spine.  She was offered an SI injection to help with low back pain. 5 hours after the injection she suffered a heart attack in which a she received an ICD and stent. Today, she claims that she cannot feel her left leg. She attempts to walk on a treadmill for her cardiac rehab in which her left leg will give out 10-15 times during her hour long rehab. She reports that she is currently in physical therapy that is providing no relief. Describes pain as crushing in nature.  Located in low back.  + numbness . + burning. Denies fever or chills, no night sweats. Denies any bladder or bowel changes.      I have personally reviewed and/or updated the patient's past medical history, past surgical history, family history, social history, current medications, allergies, and vital signs today.     Review of Systems   Constitutional:  Positive for activity change.   HENT: Negative.     Eyes: Negative.    Respiratory: Negative.     Cardiovascular: Negative.    Gastrointestinal: Negative.    Endocrine: Negative.    Genitourinary: Negative.    Musculoskeletal:  Positive for arthralgias, back pain, gait problem and myalgias.   Skin: Negative.    Allergic/Immunologic: Negative.    Neurological:  Positive for weakness and numbness.   Hematological: Negative.     Psychiatric/Behavioral: Negative.     All other systems reviewed and are negative.      Patient Active Problem List   Diagnosis   • Atrial flutter, paroxysmal ([facility])   • Mixed hyperlipidemia   • History of tobacco abuse   • Ischemic cardiomyopathy   • Chronic low back pain   • HFrEF (heart failure with reduced ejection fraction) ([facility])   • S/P ICD (internal cardiac defibrillator) procedure   • Back pain   • Sciatica of left side   • Syncope   • History of sustained ventricular tachycardia   • Coronary artery disease involving native coronary artery of native heart   • S/P coronary artery stent placement   • Type 2 diabetes mellitus, without long-term current use of insulin ([facility])   • Acquired hypothyroidism   • History of pulmonary embolism   • History of gastric ulcer   • Ganglion cyst of finger of left hand   • Class 1 obesity due to excess calories with serious comorbidity and body mass index (BMI) of 34.0 to 34.9 in adult   • POP (obstructive sleep apnea)   • Localized edema   • Primary osteoarthritis of right knee   • History of torn meniscus of right knee   • Chronic pain of right knee   • Numbness and tingling in left arm   • Chronic kidney disease, stage 4 (severe) ([facility])   • Other pulmonary embolism without acute cor pulmonale, unspecified chronicity ([facility])   • Acute on chronic systolic (congestive) heart failure ([facility])   • Obesity, morbid ([facility])   • Type 2 diabetes mellitus with stage 3 chronic kidney disease, without long-term current use of insulin, unspecified whether stage 3a or 3b CKD ([facility])       Past Medical History:   Diagnosis Date   • Acute respiratory insufficiency 03/22/2024   • Allergic    • Chronic HFrEF (heart failure with reduced ejection fraction) ([facility])    • Coronary artery disease    • COVID-19 virus infection 11/28/2022   • Diabetes mellitus ([facility])    • Disease of thyroid gland 4/09/2024   • Heart disease 3/24   • Hyperlipidemia    • Hypoglycemia    • ICD (implantable  cardioverter-defibrillator) in place    • Ischemic cardiomyopathy    • Lactic acidosis 2024   • Myocardial infarction ([facility]) 3/22/2024   • Otitis media    • Seasonal allergies    • ST elevation myocardial infarction involving left anterior descending (LAD) coronary artery ([facility]) 2024   • Tobacco abuse    • Visual impairment        Past Surgical History:   Procedure Laterality Date   • ADENOIDECTOMY     • APPENDECTOMY     • APPENDECTOMY LAPAROSCOPIC N/A 2024    Procedure: APPENDECTOMY LAPAROSCOPIC;  Surgeon: Lauryn Ullrich, DO;  Location: [facility];  Service: General   • BACK SURGERY     • BREAST EXCISIONAL BIOPSY Right 2000    cyst removed- benign   • CARDIAC CATHETERIZATION N/A 2024    Procedure: Cardiac pci;  Surgeon: Gabriel Myers MD;  [facility];  Service: Cardiology   • CARDIAC CATHETERIZATION N/A 2024    Procedure: Cardiac Coronary Angiogram;  Surgeon: Gabriel Myers MD;  [facility];  Service: Cardiology   • CARDIAC CATHETERIZATION N/A 2024    Procedure: Cardiac PCI AMI;  Surgeon: Gabriel Myers MD;  [facility];  Service: Cardiology   • CARDIAC CATHETERIZATION N/A 2024    Procedure: Cardiac IVUS;  Surgeon: Gabriel Myers MD;  [facility];  Service: Cardiology   • CARDIAC DEFIBRILLATOR PLACEMENT     • CARDIAC ELECTROPHYSIOLOGY PROCEDURE N/A 2024    Procedure: Cardiac icd implant;  Surgeon: Jeffy Lama MD;  [facility];  Service: Cardiology   •  SECTION     • COLONOSCOPY     • ECTOPIC PREGNANCY SURGERY     • INSERT / REPLACE / REMOVE PACEMAKER     • MASS EXCISION Left 10/18/2024    Procedure: EXCISION BIOPSY TISSUE LESION/MASS UPPER EXTREMITY - Left index finger;  Surgeon: Leandro Campos MD;  Location: [facility];  Service: Orthopedics   • SEPTOPLASTY     • SPINE SURGERY  23   • TONSILLECTOMY         Family History   Adopted: Yes   Problem Relation Age  of Onset   • Depression Mother    • Heart disease Father    • OCD Daughter        Social History     Occupational History   • Not on file   Tobacco Use   • Smoking status: Former     Current packs/day: 0.00     Average packs/day: 1 pack/day for 24.2 years (24.2 ttl pk-yrs)     Types: Cigarettes     Start date: 2000     Quit date: 3/22/2024     Years since quittin.8     Passive exposure: Never   • Smokeless tobacco: Never   • Tobacco comments:     Smoked 0.5-1 ppd; quit in 2024.    Vaping Use   • Vaping status: Never Used   Substance and Sexual Activity   • Alcohol use: Never     Alcohol/week: 1.0 standard drink of alcohol     Types: 1 Shots of liquor per week     Comment: Every 2or 3months   • Drug use: Never   • Sexual activity: Not on file       Current Outpatient Medications on File Prior to Visit   Medication Sig   • albuterol (ProAir HFA) 90 mcg/act inhaler Inhale 2 puffs every 6 (six) hours as needed for wheezing   • apixaban (ELIQUIS) 5 mg Take 1 tablet (5 mg total) by mouth 2 (two) times a day   • atorvastatin (LIPITOR) 80 mg tablet TAKE 1 TABLET BY MOUTH EVERY DAY IN THE EVENING   • clopidogrel (PLAVIX) 75 mg tablet Take 1 tablet (75 mg total) by mouth daily   • diphenhydrAMINE (BENADRYL) 25 mg capsule Take 25 mg by mouth every 6 (six) hours as needed for itching   • dofetilide (TIKOSYN) 125 mcg capsule Take 1 capsule (125 mcg total) by mouth every 12 (twelve) hours   • Empagliflozin (Jardiance) 25 MG TABS Take 1 tablet (25 mg total) by mouth every morning   • ergocalciferol (VITAMIN D2) 50,000 units Take 1 capsule (50,000 Units total) by mouth once a week   • fluticasone (FLONASE) 50 mcg/act nasal spray 1 spray into each nostril daily (Patient taking differently: 1 spray into each nostril daily PRN)   • furosemide (LASIX) 20 mg tablet PLEASE SEE ATTACHED FOR DETAILED DIRECTIONS   • furosemide (LASIX) 40 mg tablet Take 1 tablet (40 mg total) by mouth 2 (two) times a day   • glimepiride (AMARYL) 1  mg tablet Take 1 tablet (1 mg total) by mouth daily with breakfast   • HYDROcodone-acetaminophen (NORCO) 5-325 mg per tablet Take 1 tablet by mouth every 6 (six) hours as needed for pain for up to 10 doses Max Daily Amount: 4 tablets   • isosorbide mononitrate (IMDUR) 30 mg 24 hr tablet Take 1 tablet (30 mg total) by mouth 2 (two) times a day 30 mg in the am and 30 mg in the pm   • levothyroxine (Synthroid) 75 mcg tablet Take 1 tablet (75 mcg total) by mouth daily   • meclizine (ANTIVERT) 25 mg tablet Take 1 tablet (25 mg total) by mouth every 8 (eight) hours as needed for dizziness   • metoprolol succinate (TOPROL-XL) 25 mg 24 hr tablet Take 2 tablets (50 mg total) by mouth daily at bedtime   • nitroglycerin (NITROSTAT) 0.4 mg SL tablet Place 1 tablet (0.4 mg total) under the tongue every 5 (five) minutes as needed for chest pain   • pantoprazole (PROTONIX) 40 mg tablet TAKE 1 TABLET BY MOUTH 2 TIMES A DAY BEFORE MEALS.   • potassium chloride (Klor-Con M20) 20 mEq tablet TAKE 1 TABLET BY MOUTH EVERY DAY   • sacubitril-valsartan (Entresto) 49-51 MG TABS Take 1 tablet by mouth 2 (two) times a day   • semaglutide, 0.25 or 0.5 mg/dose, (Ozempic, 0.25 or 0.5 MG/DOSE,) 2 mg/3 mL injection pen Inject 0.375 mL (0.25 mg total) under the skin every 7 days for 28 days, THEN 0.75 mL (0.5 mg total) every 7 days for 28 days.   • traMADol (ULTRAM) 50 mg tablet Take 1 tablet (50 mg total) by mouth every 6 (six) hours as needed for moderate pain or severe pain   • gabapentin (Neurontin) 100 mg capsule Take 1 capsule (100 mg total) by mouth 2 (two) times a day (Patient not taking: Reported on 2/7/2025)   • [START ON 2/17/2025] semaglutide, 1 mg/dose, (Ozempic) 4 mg/3 mL injection pen Inject 0.75 mL (1 mg total) under the skin once a week for 28 days Do not start before February 17, 2025. (Patient not taking: Reported on 1/3/2025 Do not start before February 17, 2025.)   • [START ON 3/17/2025] semaglutide, 2 mg/dose, (Ozempic) 8 mg/  "mL injection pen Inject 0.75 mL (2 mg total) under the skin every 7 days Do not start before March 17, 2025. (Patient not taking: Reported on 1/3/2025 Do not start before March 17, 2025.)     No current facility-administered medications on file prior to visit.       Allergies   Allergen Reactions   • Fish-Derived Products - Food Allergy Anaphylaxis     Cannot have all seafood products   • Shellfish-Derived Products - Food Allergy Anaphylaxis   • Azithromycin Hives and Rash         Physical Exam    Ht 5' 8\" (1.727 m)   Wt 101 kg (223 lb 3.2 oz)   BMI 33.94 kg/m²     Constitutional: normal, well developed, well nourished, alert, in no distress and non-toxic and no overt pain behavior. and obese  Eyes: anicteric  HEENT: grossly intact  Neck: supple, symmetric, trachea midline and no masses   Pulmonary:even and unlabored  Cardiovascular:No edema or pitting edema present  Skin:Normal without rashes or lesions and well hydrated  Psychiatric:Mood and affect appropriate  Neurologic:Cranial Nerves II-XII grossly intact Sensation grossly intact; no clonus negative youngblood's. Reflexes 2+ and brisk. SLR negative bilaterally.   Musculoskeletal:normal gait. 3-4/5 strength with left hip flexion, otherwise 5/5 strength bilaterally with AROM in lower extremities. Normal heel toe and tip toe walking. Significant pain with lumbar facet loading bilaterally and with lateral spine rotation, ttp over lumbar paraspinal muscles. Negative pia's test, negative gaenslen's negative SIJ loading bilaterally.    Imaging    MRI LUMBAR SPINE WITHOUT CONTRAST     INDICATION: S32.009K: Unspecified fracture of unspecified lumbar vertebra, subsequent encounter for fracture with nonunion  M54.9: Dorsalgia, unspecified  M54.16: Radiculopathy, lumbar region.     COMPARISON: Lumbar spine radiographs from 1/14/2025, CT abdomen pelvis from 5/7/2024     TECHNIQUE:  Multiplanar, multisequence imaging of the lumbar spine was performed. .        IMAGE " QUALITY:  Diagnostic     FINDINGS:     VERTEBRAL BODIES:  There are 5 lumbar type vertebral bodies. Grade 1 anterolisthesis of L4 on L5 again demonstrated. Postsurgical changes from prior posterior lumbar spinal fusion procedures at L3-S1, with bilateral transpedicular screws, vertical rods.   Laminectomies at L3-S1 again demonstrated. No compression fractures identified. Normal marrow signal is identified within the visualized bony structures.  No discrete marrow lesion.     SACRUM:  Normal signal within the sacrum. No evidence of insufficiency or stress fracture.     DISTAL CORD AND CONUS:  Normal size and signal within the distal cord and conus. Conus terminates at T12-L1.     PARASPINAL SOFT TISSUES:  Paraspinal soft tissues are unremarkable.     LOWER THORACIC DISC SPACES:  Normal disc height and signal.  No disc herniation, canal stenosis or foraminal narrowing.     LUMBAR DISC SPACES:     L1-L2:  Normal.     L2-L3: Mild bilateral facet arthropathy. Otherwise normal.     L3-L4: Posterior fusion at this level, with laminectomies. Disc bulge with likely superimposed left central and subarticular disc protrusion. No canal stenosis. Mild left subarticular zone narrowing. Mild bilateral foraminal stenosis.     L4-L5: Anterolisthesis of L4 on L5. This level is fused posteriorly with laminectomies. Mild disc bulge. No canal stenosis. No foraminal stenosis identified.     L5-S1: This level is fused posteriorly with laminectomies. Disc height loss, disc osteophyte complex. Bilateral facet arthropathy. No canal stenosis. No foraminal stenosis identified.     OTHER FINDINGS: Lower pole left renal cyst for which no further imaging evaluation or follow-up is needed.     IMPRESSION:     Prior posterior lumbar spinal fusion procedure from L3-S1, with laminectomies at these levels.     Multilevel lumbar spondylosis, as described above, without canal stenosis, but there is mild left subarticular zone narrowing and mild  bilateral foraminal stenosis at L3-L4 due to a left-sided disc protrusion at this level.

## 2025-02-07 NOTE — PROGRESS NOTES
Assessment  1. History of lumbar spinal fusion  -     Ambulatory referral to Spine & Pain Management  2. Radiculopathy, lumbar region  -     Ambulatory referral to Spine & Pain Management    Axial low back pain described primarily by arthritic features.  Aching, nagging, indolent, stabbing, throbbing features in axial low back with left greater than right L3 radicular components. Physical exam showing weakness of the left lower extremity and ambulatory dysfunction. Positive facet loading maneuvers in lumbar spine elicited pain, positive tenderness to palpation over lumbar paraspinal muscles.  Findings correlate with prominent lumbar facet arthropathy seen throughout axial low back on imaging studies.Loosening of hardware noted by lucency on MRI lumbar spine. Currently she is neurologically intact without gait instability, saddle anesthesia or bowel/bladder abnormality. Scheduled for ortho spine intervention for ongoing symptoms related to pseudoarthrosis. Risks, benefits and alternatives to ALICIA vs med management thoroughly discussed with patient given multiple medical comorbidity.  Handouts provided questions answered to patient satisfaction.    Plan  -f/u 4 weeks  -gabapentin transitioned to lyrica 50 mg t.i.d. Ordered for patient; counseled regarding sedative effects of taking this medication and provided up titration calendar.  Counseled not to take medication while driving or operating heavy machinery/using stairs  -has been particpant with formal physical therapy for lumbar radiculopathy; Physician directed home exercise plan as per [employer] demonstrated and handouts provided that patient plans to participate with for 1 hour, twice a week for the next 6 weeks.     There are risks associated with opioid medications, including dependence, addiction and tolerance. The patient understands and agrees to use these medications only as prescribed. Potential side effects of the medications include, but are not limited  to, constipation, drowsiness, addiction, impaired judgment and risk of fatal overdose if not taken as prescribed. The patient was warned against driving while taking sedation medications or operating heavy machinery. The patient voiced understanding. Sharing medications is a felony. At this point in time, the patient is showing no signs of addiction, abuse, diversion or suicidal ideation.     [place] Prescription Drug Monitoring Program report was reviewed and was appropriate      Complete risks and benefits including bleeding, infection, tissue reaction, nerve injury and allergic reaction were discussed. The approach was demonstrated using models and literature was provided. Verbal and written consent was obtained.     My impressions and treatment recommendations were discussed in detail with the patient who verbalized understanding and had no further questions.  Discharge instructions were provided. I personally saw and examined the patient and I agree with the above discussed plan of care.                                               Review of external notes including PT notes, PCP notes and specialist notes was performed at this visit in addition to review of new ordered imaging and past imaging to develop or modify multidisciplinary pain plan    New Medications Ordered This Visit   Medications    furosemide (LASIX) 20 mg tablet     Sig: PLEASE SEE ATTACHED FOR DETAILED DIRECTIONS       History of Present Illness    Vesna Langston is a 64 y.o. female with pmhx of CAD with ICD/pacemaker, obesity, prior L3-S1 fusion, on plavix and eliquis, DM-2 on insulin, CKD-4, presenting for initial visit with chief complaint of low back pain - referred by Dr. Mcrae for treatement options. Prior to her fusion she was treated by Dr. Ko and had good response to ESIs/Ablation in past. Pain began 2 years ago after an injury at work. She received a fusion of L3-S1 in 2023.  2 weeks after she return to work she suffered a  twisting injury which caused her to return to her surgeon.  X-ray revealed loosening of multiple screws in her lumbar spine.  She was offered an SI injection to help with low back pain. 5 hours after the injection she suffered a heart attack in which a she received an ICD and stent. Today, she claims that she cannot feel her left leg. She attempts to walk on a treadmill for her cardiac rehab in which her left leg will give out 10-15 times during her hour long rehab. She reports that she is currently in physical therapy that is providing no relief. Describes pain as crushing in nature.  Located in low back.  + numbness . + burning. Denies fever or chills, no night sweats. Denies any bladder or bowel changes.      I have personally reviewed and/or updated the patient's past medical history, past surgical history, family history, social history, current medications, allergies, and vital signs today.     Review of Systems   Constitutional:  Positive for activity change.   HENT: Negative.     Eyes: Negative.    Respiratory: Negative.     Cardiovascular: Negative.    Gastrointestinal: Negative.    Endocrine: Negative.    Genitourinary: Negative.    Musculoskeletal:  Positive for arthralgias, back pain, gait problem and myalgias.   Skin: Negative.    Allergic/Immunologic: Negative.    Neurological:  Positive for weakness and numbness.   Hematological: Negative.    Psychiatric/Behavioral: Negative.     All other systems reviewed and are negative.      Patient Active Problem List   Diagnosis    Atrial flutter, paroxysmal ([facility])    Mixed hyperlipidemia    History of tobacco abuse    Ischemic cardiomyopathy    Chronic low back pain    HFrEF (heart failure with reduced ejection fraction) ([facility])    S/P ICD (internal cardiac defibrillator) procedure    Back pain    Sciatica of left side    Syncope    History of sustained ventricular tachycardia    Coronary artery disease involving native coronary artery of native heart    S/P  coronary artery stent placement    Type 2 diabetes mellitus, without long-term current use of insulin (HCC)    Acquired hypothyroidism    History of pulmonary embolism    History of gastric ulcer    Ganglion cyst of finger of left hand    Class 1 obesity due to excess calories with serious comorbidity and body mass index (BMI) of 34.0 to 34.9 in adult    POP (obstructive sleep apnea)    Localized edema    Primary osteoarthritis of right knee    History of torn meniscus of right knee    Chronic pain of right knee    Numbness and tingling in left arm    Chronic kidney disease, stage 4 (severe) (HCC)    Other pulmonary embolism without acute cor pulmonale, unspecified chronicity (HCC)    Acute on chronic systolic (congestive) heart failure (HCC)    Obesity, morbid (HCC)    Type 2 diabetes mellitus with stage 3 chronic kidney disease, without long-term current use of insulin, unspecified whether stage 3a or 3b CKD ([facility])       Past Medical History:   Diagnosis Date    Acute respiratory insufficiency 03/22/2024    Allergic     Chronic HFrEF (heart failure with reduced ejection fraction) ([facility])     Coronary artery disease     COVID-19 virus infection 11/28/2022    Diabetes mellitus (HCC)     Disease of thyroid gland 4/09/2024    Heart disease 3/24    Hyperlipidemia     Hypoglycemia     ICD (implantable cardioverter-defibrillator) in place     Ischemic cardiomyopathy     Lactic acidosis 03/22/2024    Myocardial infarction ([facility]) 3/22/2024    Otitis media 1966    Seasonal allergies     ST elevation myocardial infarction involving left anterior descending (LAD) coronary artery ([facility]) 03/22/2024    Tobacco abuse     Visual impairment 1967       Past Surgical History:   Procedure Laterality Date    ADENOIDECTOMY      APPENDECTOMY      APPENDECTOMY LAPAROSCOPIC N/A 05/08/2024    Procedure: APPENDECTOMY LAPAROSCOPIC;  Surgeon: Lauryn Ullrich, DO;  Location: [facility];  Service: General    BACK SURGERY  8/23    BREAST EXCISIONAL  BIOPSY Right 2000    cyst removed- benign    CARDIAC CATHETERIZATION N/A 2024    Procedure: Cardiac pci;  Surgeon: Gabriel Myers MD;  [facility];  Service: Cardiology    CARDIAC CATHETERIZATION N/A 2024    Procedure: Cardiac Coronary Angiogram;  Surgeon: Gabriel Myers MD;  [facility];  Service: Cardiology    CARDIAC CATHETERIZATION N/A 2024    Procedure: Cardiac PCI AMI;  Surgeon: Gabriel Myers MD;  [facility];  Service: Cardiology    CARDIAC CATHETERIZATION N/A 2024    Procedure: Cardiac IVUS;  Surgeon: Gabriel Myers MD;  [facility];  Service: Cardiology    CARDIAC DEFIBRILLATOR PLACEMENT      CARDIAC ELECTROPHYSIOLOGY PROCEDURE N/A 2024    Procedure: Cardiac icd implant;  Surgeon: Jeffy Lama MD;  [facility];  Service: Cardiology     SECTION      COLONOSCOPY      ECTOPIC PREGNANCY SURGERY      INSERT / REPLACE / REMOVE PACEMAKER      MASS EXCISION Left 10/18/2024    Procedure: EXCISION BIOPSY TISSUE LESION/MASS UPPER EXTREMITY - Left index finger;  Surgeon: Leandro Campos MD;  Location:  MAIN OR;  Service: Orthopedics    SEPTOPLASTY      SPINE SURGERY  23    TONSILLECTOMY         Family History   Adopted: Yes   Problem Relation Age of Onset    Depression Mother     Heart disease Father     OCD Daughter        Social History     Occupational History    Not on file   Tobacco Use    Smoking status: Former     Current packs/day: 0.00     Average packs/day: 1 pack/day for 24.2 years (24.2 ttl pk-yrs)     Types: Cigarettes     Start date: 2000     Quit date: 3/22/2024     Years since quittin.8     Passive exposure: Never    Smokeless tobacco: Never    Tobacco comments:     Smoked 0.5-1 ppd; quit in 2024.    Vaping Use    Vaping status: Never Used   Substance and Sexual Activity    Alcohol use: Never     Alcohol/week: 1.0 standard drink of alcohol     Types: 1 Shots of  liquor per week     Comment: Every 2or 3months    Drug use: Never    Sexual activity: Not on file       Current Outpatient Medications on File Prior to Visit   Medication Sig    albuterol (ProAir HFA) 90 mcg/act inhaler Inhale 2 puffs every 6 (six) hours as needed for wheezing    apixaban (ELIQUIS) 5 mg Take 1 tablet (5 mg total) by mouth 2 (two) times a day    atorvastatin (LIPITOR) 80 mg tablet TAKE 1 TABLET BY MOUTH EVERY DAY IN THE EVENING    clopidogrel (PLAVIX) 75 mg tablet Take 1 tablet (75 mg total) by mouth daily    diphenhydrAMINE (BENADRYL) 25 mg capsule Take 25 mg by mouth every 6 (six) hours as needed for itching    dofetilide (TIKOSYN) 125 mcg capsule Take 1 capsule (125 mcg total) by mouth every 12 (twelve) hours    Empagliflozin (Jardiance) 25 MG TABS Take 1 tablet (25 mg total) by mouth every morning    ergocalciferol (VITAMIN D2) 50,000 units Take 1 capsule (50,000 Units total) by mouth once a week    fluticasone (FLONASE) 50 mcg/act nasal spray 1 spray into each nostril daily (Patient taking differently: 1 spray into each nostril daily PRN)    furosemide (LASIX) 20 mg tablet PLEASE SEE ATTACHED FOR DETAILED DIRECTIONS    furosemide (LASIX) 40 mg tablet Take 1 tablet (40 mg total) by mouth 2 (two) times a day    glimepiride (AMARYL) 1 mg tablet Take 1 tablet (1 mg total) by mouth daily with breakfast    HYDROcodone-acetaminophen (NORCO) 5-325 mg per tablet Take 1 tablet by mouth every 6 (six) hours as needed for pain for up to 10 doses Max Daily Amount: 4 tablets    isosorbide mononitrate (IMDUR) 30 mg 24 hr tablet Take 1 tablet (30 mg total) by mouth 2 (two) times a day 30 mg in the am and 30 mg in the pm    levothyroxine (Synthroid) 75 mcg tablet Take 1 tablet (75 mcg total) by mouth daily    meclizine (ANTIVERT) 25 mg tablet Take 1 tablet (25 mg total) by mouth every 8 (eight) hours as needed for dizziness    metoprolol succinate (TOPROL-XL) 25 mg 24 hr tablet Take 2 tablets (50 mg total) by  "mouth daily at bedtime    nitroglycerin (NITROSTAT) 0.4 mg SL tablet Place 1 tablet (0.4 mg total) under the tongue every 5 (five) minutes as needed for chest pain    pantoprazole (PROTONIX) 40 mg tablet TAKE 1 TABLET BY MOUTH 2 TIMES A DAY BEFORE MEALS.    potassium chloride (Klor-Con M20) 20 mEq tablet TAKE 1 TABLET BY MOUTH EVERY DAY    sacubitril-valsartan (Entresto) 49-51 MG TABS Take 1 tablet by mouth 2 (two) times a day    semaglutide, 0.25 or 0.5 mg/dose, (Ozempic, 0.25 or 0.5 MG/DOSE,) 2 mg/3 mL injection pen Inject 0.375 mL (0.25 mg total) under the skin every 7 days for 28 days, THEN 0.75 mL (0.5 mg total) every 7 days for 28 days.    traMADol (ULTRAM) 50 mg tablet Take 1 tablet (50 mg total) by mouth every 6 (six) hours as needed for moderate pain or severe pain    gabapentin (Neurontin) 100 mg capsule Take 1 capsule (100 mg total) by mouth 2 (two) times a day (Patient not taking: Reported on 2/7/2025)    [START ON 2/17/2025] semaglutide, 1 mg/dose, (Ozempic) 4 mg/3 mL injection pen Inject 0.75 mL (1 mg total) under the skin once a week for 28 days Do not start before February 17, 2025. (Patient not taking: Reported on 1/3/2025 Do not start before February 17, 2025.)    [START ON 3/17/2025] semaglutide, 2 mg/dose, (Ozempic) 8 mg/ mL injection pen Inject 0.75 mL (2 mg total) under the skin every 7 days Do not start before March 17, 2025. (Patient not taking: Reported on 1/3/2025 Do not start before March 17, 2025.)     No current facility-administered medications on file prior to visit.       Allergies   Allergen Reactions    Fish-Derived Products - Food Allergy Anaphylaxis     Cannot have all seafood products    Shellfish-Derived Products - Food Allergy Anaphylaxis    Azithromycin Hives and Rash         Physical Exam    Ht 5' 8\" (1.727 m)   Wt 101 kg (223 lb 3.2 oz)   BMI 33.94 kg/m²     Constitutional: normal, well developed, well nourished, alert, in no distress and non-toxic and no overt pain " behavior. and obese  Eyes: anicteric  HEENT: grossly intact  Neck: supple, symmetric, trachea midline and no masses   Pulmonary:even and unlabored  Cardiovascular:No edema or pitting edema present  Skin:Normal without rashes or lesions and well hydrated  Psychiatric:Mood and affect appropriate  Neurologic:Cranial Nerves II-XII grossly intact Sensation grossly intact; no clonus negative youngblood's. Reflexes 2+ and brisk. SLR negative bilaterally.   Musculoskeletal:normal gait. 3-4/5 strength with left hip flexion, otherwise 5/5 strength bilaterally with AROM in lower extremities. Normal heel toe and tip toe walking. Significant pain with lumbar facet loading bilaterally and with lateral spine rotation, ttp over lumbar paraspinal muscles. Negative pia's test, negative gaenslen's negative SIJ loading bilaterally.    Imaging    MRI LUMBAR SPINE WITHOUT CONTRAST     INDICATION: S32.009K: Unspecified fracture of unspecified lumbar vertebra, subsequent encounter for fracture with nonunion  M54.9: Dorsalgia, unspecified  M54.16: Radiculopathy, lumbar region.     COMPARISON: Lumbar spine radiographs from 1/14/2025, CT abdomen pelvis from 5/7/2024     TECHNIQUE:  Multiplanar, multisequence imaging of the lumbar spine was performed. .        IMAGE QUALITY:  Diagnostic     FINDINGS:     VERTEBRAL BODIES:  There are 5 lumbar type vertebral bodies. Grade 1 anterolisthesis of L4 on L5 again demonstrated. Postsurgical changes from prior posterior lumbar spinal fusion procedures at L3-S1, with bilateral transpedicular screws, vertical rods.   Laminectomies at L3-S1 again demonstrated. No compression fractures identified. Normal marrow signal is identified within the visualized bony structures.  No discrete marrow lesion.     SACRUM:  Normal signal within the sacrum. No evidence of insufficiency or stress fracture.     DISTAL CORD AND CONUS:  Normal size and signal within the distal cord and conus. Conus terminates at T12-L1.      PARASPINAL SOFT TISSUES:  Paraspinal soft tissues are unremarkable.     LOWER THORACIC DISC SPACES:  Normal disc height and signal.  No disc herniation, canal stenosis or foraminal narrowing.     LUMBAR DISC SPACES:     L1-L2:  Normal.     L2-L3: Mild bilateral facet arthropathy. Otherwise normal.     L3-L4: Posterior fusion at this level, with laminectomies. Disc bulge with likely superimposed left central and subarticular disc protrusion. No canal stenosis. Mild left subarticular zone narrowing. Mild bilateral foraminal stenosis.     L4-L5: Anterolisthesis of L4 on L5. This level is fused posteriorly with laminectomies. Mild disc bulge. No canal stenosis. No foraminal stenosis identified.     L5-S1: This level is fused posteriorly with laminectomies. Disc height loss, disc osteophyte complex. Bilateral facet arthropathy. No canal stenosis. No foraminal stenosis identified.     OTHER FINDINGS: Lower pole left renal cyst for which no further imaging evaluation or follow-up is needed.     IMPRESSION:     Prior posterior lumbar spinal fusion procedure from L3-S1, with laminectomies at these levels.     Multilevel lumbar spondylosis, as described above, without canal stenosis, but there is mild left subarticular zone narrowing and mild bilateral foraminal stenosis at L3-L4 due to a left-sided disc protrusion at this level.

## 2025-02-07 NOTE — LETTER
February 7, 2025     Patient: Vesna Langston  YOB: 1960  Date of Visit: 2/7/2025      To Whom it May Concern:    Vesna Langston is under my professional care. Vesna was seen in my office on 2/7/2025. Vesna may not return to work until after planned spine surgery.    If you have any questions or concerns, please don't hesitate to call.         Sincerely,          Maxime Cameron MD        CC: No Recipients

## 2025-02-07 NOTE — PATIENT INSTRUCTIONS
"Patient Education     Back exercises   The Basics   Written by the doctors and editors at [facility]   Why do I need to do back exercises? -- Back exercises can help ease back pain and might help prevent future back pain. Long term, it is important to strengthen the muscles in your lower back, buttocks, and belly. These are your \"core muscles.\" Stretching exercises are also important to keep your muscles flexible.  Below are some stretching and strengthening exercises that might help you. Other forms of movement can help ease or prevent back pain, too. For example, some people like to walk, do aerobic exercise, or do yoga or chrissie chi. The most important thing is to move your body. Your doctor, nurse, or physical therapist can help you find different types of activity that work for you.  What stretching exercises should I do? -- Below are some examples of stretching exercises. Warm up your muscles before stretching to help prevent injury. To warm up, you can walk, jog, or cycle for a few minutes.  Start by repeating each of these stretches 2 to 3 times. Try to hold each stretch for 5 to 10 seconds, and try to do the stretches 2 to 3 times each day. Breathe slowly and deeply as you do the exercises. Never bounce when doing stretches.   Cat-cow stretch (figure 1) - Start on all fours on the floor, with your hands under your shoulders, knees under your hips, and your back flat. First, tuck your chin and tighten your stomach muscles as you round your back (like a \"cat\"). Hold the stretch for about 10 seconds. Rest for a few seconds as you flatten your back. Next, lift your chin and let your belly and lower back sink toward the floor (like a \"cow\"). Hold the stretch for about 10 seconds.   Single knee-to-chest stretches (figure 2) ? While lying on your back, bend your knees with your feet flat on the floor. Pull 1 knee toward your chest until you feel a stretch in your lower back and buttock area. Lower, and repeat with the " other knee. If you have knee problems, pull your knee up by grabbing the back of your thigh instead of the front of your knee. You can also do this exercise by grabbing both knees at the same time.   Lower trunk rotations (figure 3) ? While lying on your back, bend your knees with your feet flat on the floor. Keep your knees and ankles together, and then drop them to 1 side. Keep both of your shoulders touching the floor until you feel a stretch in the muscles at the side of the back. Repeat on the other side.   Lower back stretches seated (figure 4) ? Sit in a chair with your feet spread about shoulder width apart. Then, lean forward until you feel a stretch in your lower back.  What strengthening exercises should I do? -- Below are some examples of strengthening exercises.  Start by doing each exercise 2 to 3 times. Work up to doing each exercise 10 times. Hold each exercise for 3 to 5 seconds, and try to do the exercises 2 to 3 times each day. Do all exercises slowly.   Shoulder blade squeezes (figure 5) ? Pinch your shoulder blades together on your upper back, and hold 3 to 5 seconds. You can also do these 1 side at a time. Sit with good posture, and make sure that your shoulders do not rise up when you do this exercise. Relax.   Pelvic tilts (figure 6) ? Lie on your back with your knees bent and feet flat on the floor. Tighten your stomach muscles, and press your lower back down to the floor. Relax. You should be able to breathe comfortably during this exercise.   Hip lifts (figure 7) ? Lie on your back with your knees bent and feet flat on the floor. Tighten your stomach muscles, keep your back flat, and lift your buttocks off of the floor. Relax. You should feel this in your buttocks, not in your lower back.  What else should I know?    Exercise, including stretching, might be slightly uncomfortable. But you should not have sharp or severe pain. If you do get severe pain, stop what you are doing. If severe  "pain continues, call your doctor or nurse.   Do not hold your breath when exercising. If you tend to hold your breath, try counting out loud when exercising. If any exercise bothers you, stop right away.   Always warm up before exercising. Warm muscles stretch much easier than cool muscles. Stretching cool muscles can lead to injury.   Doing exercises before a meal can be a good way to get into a routine.  All topics are updated as new evidence becomes available and our peer review process is complete.  This topic retrieved from [employer] on: Apr 03, 2024.  Topic 856160 Version 2.0  Release: 32.2.4 - C32.92  © 2024 [employer]ToDate, Inc. and/or its affiliates. All rights reserved.  figure 1: Cat-cow stretch     Start on all fours on the floor, with hands under your shoulders, knees under your hips, and your back flat. First, tuck your chin and tighten your stomach muscles as you round your back (like a \"cat\"). Hold the stretch for about 10 seconds. Rest for a few seconds as you flatten your back. Next, lift your chin and let your belly and lower back sink toward the floor (like a \"cow\"). Hold the stretch for about 10 seconds.  Graphic [id number] Version 1.0  figure 2: Single knee-to-chest stretch     Lie on your back, bend your knees, and have your feet flat on the floor. Pull 1 knee toward your chest until you feel a stretch in your lower back and buttock area. Repeat with the other knee. If you have knee problems, pull your knee up by grabbing the back of your thigh instead of the front of your knee. You can also do this exercise by grabbing both knees at the same time.  Graphic [id number] Version 1.0  figure 3: Lower trunk rotation     While lying on your back, bend your knees and have your feet flat on the floor. Keep your legs together and then drop them to 1 side. Keep both of your shoulders touching the floor until you feel a stretch in the muscles at the side of the back. Repeat on the other side.  Graphic [id number] Version " 1.0  figure 4: Lower back stretch     Sit in a chair with your feet spread about shoulder width apart. Then, lean forward until you feel a stretch in your lower back.  Graphic [id number] Version 1.0  figure 5: Shoulder blade squeezes     Pinch your shoulder blades together on your upper back and hold for 3 to 5 seconds. Make sure that you are sitting with good posture and that your shoulders do not raise up when you do this exercise. Relax.  Graphic [id number] Version 1.0  figure 6: Pelvic tilts     Lie on your back with your knees bent and feet flat on the floor. Tighten your stomach muscles and press your lower back down to the floor. Relax.  Graphic [id number] Version 1.0  figure 7: Hip lifts     Lie on your back with your knees bent and feet flat on the floor. Tighten your stomach muscles and lift your buttocks off of the floor. Relax.  Graphic [id number] Version 1.0  Consumer Information Use and Disclaimer   Disclaimer: This generalized information is a limited summary of diagnosis, treatment, and/or medication information. It is not meant to be comprehensive and should be used as a tool to help the user understand and/or assess potential diagnostic and treatment options. It does NOT include all information about conditions, treatments, medications, side effects, or risks that may apply to a specific patient. It is not intended to be medical advice or a substitute for the medical advice, diagnosis, or treatment of a health care provider based on the health care provider's examination and assessment of a patient's specific and unique circumstances. Patients must speak with a health care provider for complete information about their health, medical questions, and treatment options, including any risks or benefits regarding use of medications. This information does not endorse any treatments or medications as safe, effective, or approved for treating a specific patient. Up[employer], Inc[employer] and its affiliates disclaim any warranty or  liability relating to this information or the use thereof.The use of this information is governed by the Terms of Use, available at https://www.wolterskluwer.com/en/know/clinical-effectiveness-terms. 2024© UpToDate, Inc. and its affiliates and/or licensors. All rights reserved.  Copyright   © 2024 [employer], Inc. and/or its affiliates. All rights reserved.  Patient Education     Pregabalin (pre CT a carlos)   Brand Names: US Lyrica; Lyrica CR   Brand Names: Luzmaria ACH-Pregabalin; AG-Pregabalin; APO-Pregabalin; Auro-Pregabalin; DOM-Pregabalin; JAMP-Pregabalin; Lyrica; M-Pregabalin; Mar-Pregabalin; MINT-Pregabalin; ALYSSA-Pregabalin; NRA-Pregabalin; PMS-Pregabalin; HIEU-Pregabalin; SANDOZ Pregabalin; TARO-Pregabalin; TEVA-Pregabalin   What is this drug used for?   It is used to help control certain kinds of seizures.  It is used to treat painful nerve diseases.  It is used to treat fibromyalgia.  It may be given to you for other reasons. Talk with the doctor.  What do I need to tell my doctor BEFORE I take this drug?   If you are allergic to this drug; any part of this drug; or any other drugs, foods, or substances. Tell your doctor about the allergy and what signs you had.  If you have kidney disease.  If you are breast-feeding. Do not breast-feed while you take this drug.  This is not a list of all drugs or health problems that interact with this drug.  Tell your doctor and pharmacist about all of your drugs (prescription or OTC, natural products, vitamins) and health problems. You must check to make sure that it is safe for you to take this drug with all of your drugs and health problems. Do not start, stop, or change the dose of any drug without checking with your doctor.  What are some things I need to know or do while I take this drug?   Tell all of your health care providers that you take this drug. This includes your doctors, nurses, pharmacists, and dentists.  Avoid driving and doing other tasks or actions that  call for you to be alert or have clear eyesight until you see how this drug affects you.  If seizures are different or worse after starting this drug, talk with the doctor.  Do not stop taking this drug all of a sudden without calling your doctor. You may have a greater risk of side effects. If you need to stop this drug, you will want to slowly stop it as ordered by your doctor.  Avoid drinking alcohol while taking this drug.  Talk with your doctor before you use marijuana, other forms of cannabis, or prescription or OTC drugs that may slow your actions.  A very bad reaction called angioedema has happened with this drug. Sometimes, this may be life-threatening. Signs may include swelling of the hands, face, lips, eyes, tongue, or throat; trouble breathing; trouble swallowing; or unusual hoarseness. Get medical help right away if you have any of these signs.  Severe breathing problems have happened with this drug in people taking certain other drugs (like opioid pain drugs). This has also happened in people who already have lung or breathing problems. The risk may also be greater in people who are older than 65. Sometimes, breathing problems have been deadly. If you have questions, talk with the doctor.  If you are 65 or older, use this drug with care. You could have more side effects.  Talk with your doctor if you plan to father a child. This drug made male animals less fertile and caused sperm changes. Birth defects also happened in the young of male animals treated with this drug. It is not known if these problems happen in humans.  Tell your doctor if you are pregnant or plan on getting pregnant. You will need to talk about the benefits and risks of using this drug while you are pregnant.  What are some side effects that I need to call my doctor about right away?   WARNING/CAUTION: Even though it may be rare, some people may have very bad and sometimes deadly side effects when taking a drug. Tell your doctor or  get medical help right away if you have any of the following signs or symptoms that may be related to a very bad side effect:  Signs of an allergic reaction, like rash; hives; itching; red, swollen, blistered, or peeling skin with or without fever; wheezing; tightness in the chest or throat; trouble breathing, swallowing, or talking; unusual hoarseness; or swelling of the mouth, face, lips, tongue, or throat.  Change in eyesight.  Muscle pain or weakness.  Change in balance.  Feeling confused.  Shakiness.  Trouble breathing, slow breathing, or shallow breathing.  Blue or gray color of the skin, lips, nail beds, fingers, or toes.  Memory problems or loss.  Shortness of breath, a big weight gain, or swelling in the arms or legs.  Fast or abnormal heartbeat.  Fever, chills, or sore throat.  Skin sores.  Any skin change.  Trouble speaking.  Trouble sleeping.  Trouble walking.  Feeling high (easy laughing and feeling good).  Twitching.  Get medical help right away if you feel very sleepy, very dizzy, or if you pass out. Caregivers or others need to get medical help right away if the patient does not respond, does not answer or react like normal, or will not wake up.  Like other drugs that may be used for seizures, this drug may rarely raise the risk of suicidal thoughts or actions. The risk may be higher in people who have had suicidal thoughts or actions in the past. Call the doctor right away about any new or worse signs like depression; feeling nervous, restless, or grouchy; panic attacks; or other changes in mood or behavior. Call the doctor right away if any suicidal thoughts or actions occur.  Low platelet counts have rarely happened with this drug. This may lead to a higher chance of bleeding. Call your doctor right away if you have any unexplained bruising or bleeding.  What are some other side effects of this drug?   All drugs may cause side effects. However, many people have no side effects or only have minor  side effects. Call your doctor or get medical help if any of these side effects or any other side effects bother you or do not go away:  Feeling dizzy, sleepy, tired, or weak.  Weight gain.  Not able to focus.  Headache.  Dry mouth.  Constipation.  Increased appetite.  Upset stomach.  Joint pain.  Nose or throat irritation.  These are not all of the side effects that may occur. If you have questions about side effects, call your doctor. Call your doctor for medical advice about side effects.  You may report side effects to your national health agency.  You may report side effects to the FDA at 1-265.644.9889. You may also report side effects at https://www.fda.gov/medwatch.  How is this drug best taken?   Use this drug as ordered by your doctor. Read all information given to you. Follow all instructions closely.  Extended-release tablets:   Take after the evening meal if taking once daily.  Swallow whole. Do not chew, break, or crush.  Keep taking this drug as you have been told by your doctor or other health care provider, even if you feel well.  Capsules and oral solution:   Take with or without food.  Keep taking this drug as you have been told by your doctor or other health care provider, even if you feel well.  Oral solution:   Measure liquid doses carefully. Use the measuring device that comes with this drug. If there is none, ask the pharmacist for a device to measure this drug.  What do I do if I miss a dose?   Extended-release tablets:   Take a missed dose just before bedtime after eating a snack or after the next day's morning meal.  If you miss taking the missed dose after the next day's morning meal, skip the missed dose and go back to your normal time.  Do not take 2 doses at the same time or extra doses.  Capsules and oral solution:   Take a missed dose as soon as you think about it.  If it is close to the time for your next dose, skip the missed dose and go back to your normal time.  Do not take 2  doses at the same time or extra doses.  How do I store and/or throw out this drug?   Store in the original container at room temperature.  Store in a dry place. Do not store in a bathroom.  Store this drug in a safe place where children cannot see or reach it, and where other people cannot get to it. A locked box or area may help keep this drug safe. Keep all drugs away from pets.  Throw away unused or  drugs. Do not flush down a toilet or pour down a drain unless you are told to do so. Check with your pharmacist if you have questions about the best way to throw out drugs. There may be drug take-back programs in your area.  General drug facts   If your symptoms or health problems do not get better or if they become worse, call your doctor.  Do not share your drugs with others and do not take anyone else's drugs.  Some drugs may have another patient information leaflet. If you have any questions about this drug, please talk with your doctor, nurse, pharmacist, or other health care provider.  This drug comes with an extra patient fact sheet called a Medication Guide. Read it with care. Read it again each time this drug is refilled. If you have any questions about this drug, please talk with the doctor, pharmacist, or other health care provider.  If you think there has been an overdose, call your poison control center or get medical care right away. Be ready to tell or show what was taken, how much, and when it happened.  Consumer Information Use and Disclaimer   This generalized information is a limited summary of diagnosis, treatment, and/or medication information. It is not meant to be comprehensive and should be used as a tool to help the user understand and/or assess potential diagnostic and treatment options. It does NOT include all information about conditions, treatments, medications, side effects, or risks that may apply to a specific patient. It is not intended to be medical advice or a substitute for  the medical advice, diagnosis, or treatment of a health care provider based on the health care provider's examination and assessment of a patient's specific and unique circumstances. Patients must speak with a health care provider for complete information about their health, medical questions, and treatment options, including any risks or benefits regarding use of medications. This information does not endorse any treatments or medications as safe, effective, or approved for treating a specific patient. Up[employer], [employer] and its affiliates disclaim any warranty or liability relating to this information or the use thereof. The use of this information is governed by the Terms of Use, available at https://www.woltersDraftsteruwer.com/en/know/clinical-effectiveness-terms.  Last Reviewed Date   2023-12-21  Copyright   © 2024 [employer], [employer] and its affiliates and/or licensors. All rights reserved.

## 2025-02-09 NOTE — PROGRESS NOTES
Assessment & Plan/Medical Decision Makin y.o. female with Back Pain, Left Radicular Leg Pain, and Ambulatory Dysfunction and imaging findings most notable for lumbar spondylosis, L3-S1 fusion construct with evidence of pseudoarthrosis          The clinical, physical and imaging findings were reviewed with the patient.  Vesna  has a constellation of findings consistent with Lumbar Myofascial Pain, Lumbar Radiculopathy, and Ambulatory Dysfunction in the setting of lumbar degenerative disease, history of workplace injury that is s/p L3-S1 fusion with evidence of pseudoarthrosis.      We discussed the differential diagnosis including extremity/musculoskeltal pathology, peripheral nerve compression, vascular claudication etc.  Vesna symptoms, exam findings and imaging are most consistent with lumbar radiculopathy, pseudoarthrosis.  In my opinion, no further diagnostic work-up (EMG, etc) is warranted at this time.  Discussed treatment options.  Reviewed the role of further non operative treatment such as physical therapy, activity modification, medication management and interventional spine procedures.  Given that her symptoms have persisted despite conservative interventions, recommend consideration of surgical intervention.   Vesna Langston would like to proceed with lumbar surgery.       We discussed surgical options.  In my opinion, would be exploration of fusion mass, revision posterior lumbar fusion with instrumentation L3-S1, with transforaminal lumbar interbody fusion to address mechanical back pain, pseudoarthrosis.   We reviewed the options - such as instrumented vs non-instrumented, posterolateral vs interbody, local autograft vs ICBG vs allograft +/- biologic/graft extender/graft substitute supplementation for obtaining solid arthrodesis as well as the associated risk/benefit profiles and fusion efficacy as well as the FDA status of the instrumentation and products.   After discussion with the patient  will plan on posterolateral instrumented + TLIF with use of local autograft and allograft with graft extender/substitute supplementation, possible BMP.  We did discuss possibly not removing right L2 screw.  Explained at length the rationale for surgical invention and postoperative expectations.  We discussed and Vesna expressed her understanding, that in general, spine surgery is more predictive in improving extremity/radicular discomfort rather than axial spine pain, and arresting the progression of spinal cord/nerve dysfunction rather than improving.  Will utilize LSO brace immobilization during post operative recovery to help reduce pain by restricting mobility of the trunk and facilitate healing.      I reviewed with the patient possible risks of surgery which included the risk of infection, blood loss, risk of damage to adjacent nerves, vessels organs, risk of spinal fluid leak and meningitis, risk of chronic pain, incomplete resolution of symptoms, instability, adjacent segment degeneration & disease, risk of nonunion and instrumentation failure, disease transmission, need for further surgery, DVT, pulmonary embolism, blindness, paralysis and even death, as well as other risk on consent form.   We also discussed patient specific risks and mitigation including revision type surgery which increases risks of infection, blood loss, CSF leak, and associated suzanna- and post- operative related risks.   We also discussed the risk associated with her metabolic work up (vitamin d, etc).      We reviewed infection prevention.  Reviewed medications; instructed patient medications to stop before surgery (i.e. blood thinners, NSAIDs, DMARDs and vitamins)     Also reviewed with patient our narcotic policy.  We will provide medications up to 60 days postop.  In the event patient requires more medications, will need to consult their PCP and/or see a pain management physician.     I have queried the [place]  monitoring database for Vesna Langston to better assist in clinical decision making regarding prescriptions for controlled medications.  After querying the database and considering the clinical picture I have determined that she is a candidate for a prescription for control medication in the initial postoperative period.     All questions were answered.  Patient verbalized understanding.  Informed consent was obtained. Consent form was signed.  Patient had no further questions.  Patient's son was also present who had no further questions.     She received a pre-operative chest Xray at today's visit. Orders for preoperative labs (CBC, CMP, PTT, INR, type & screen, and ECG) were also placed at today's visit. Discussed pre-operative clearances, medical optimization and risk stratification.  Vesna needs pre-operative clearance from PCP and obtain PATs.      Patient instructed to return to office/ER sooner if symptoms are not improving, getting worse, or new worrisome/neurologic symptoms arise.     Subjective:      Chief Complaint: Back Pain    HPI:  Vesna Langston is a 64 y.o. female presenting for initial visit with chief complaint of low back pain - referred by Dr. Pete. This is a second opinion requested by workers comp.  Pain began 2 years ago after an injury at work. She received a fusion of L3-S1 in 2023.  2 weeks after she return to work she suffered a twisting injury which caused her to return to her surgeon.  X-ray revealed loosening of multiple screws in her lumbar spine.  She was offered an SI injection to help with low back pain. 5 hours after the injection she suffered a heart attack in which a she received an ICD and stent. Today, she claims that she cannot feel her left leg. She attempts to walk on a treadmill for her cardiac rehab in which her left leg will give out 10-15 times during her hour long rehab. She reports that she is currently in physical therapy that is providing no relief.  Describes pain as  crushing in nature.  Located in low back.  + numbness . + burning.    Denies fever or chills, no night sweats. Denies any bladder or bowel changes.      Conservative therapy includes the following:   Medications: Tylenol arthritis, gabapentin 2x per day. Norco as needed.    Injections: Yes SI joint 2 years ago     Physical Therapy: Yes  Chiropractic Medicine: has not attempted  Accupunture/Massage Therapy: has not attempted   These therapeutic modalities were ineffective at providing sustained pain relief/functional improvement.     Nicotine dependent: no  Occupation: disabled  Living situation: Lives with family   ADLs: patient is unable to perform ADL's such as shopping, cleaning, driving, etc     2/9/25 Update  Patient is here for follow up.  Obtained updated Lumbar MRI in the interim.  Vesna is here today with her son.  Continues with back pain, leg pain/fatigue.  Reports her left leg still feels weak.  She is interested in pursuing revision surgery.      Objective:     Family History   Adopted: Yes   Problem Relation Age of Onset    Depression Mother     Heart disease Father     OCD Daughter        Past Medical History:   Diagnosis Date    Acute respiratory insufficiency 03/22/2024    Allergic     Chronic HFrEF (heart failure with reduced ejection fraction) ([facility])     Coronary artery disease     COVID-19 virus infection 11/28/2022    Diabetes mellitus ([facility])     Disease of thyroid gland 4/09/2024    Heart disease 3/24    Hyperlipidemia     Hypoglycemia     ICD (implantable cardioverter-defibrillator) in place     Ischemic cardiomyopathy     Lactic acidosis 03/22/2024    Myocardial infarction ([facility]) 3/22/2024    Otitis media 1966    Seasonal allergies     ST elevation myocardial infarction involving left anterior descending (LAD) coronary artery ([facility]) 03/22/2024    Tobacco abuse     Visual impairment 1967       Current Outpatient Medications   Medication Sig Dispense Refill    albuterol (ProAir HFA) 90 mcg/act  inhaler Inhale 2 puffs every 6 (six) hours as needed for wheezing 8.5 g 0    apixaban (ELIQUIS) 5 mg Take 1 tablet (5 mg total) by mouth 2 (two) times a day 180 tablet 1    atorvastatin (LIPITOR) 80 mg tablet TAKE 1 TABLET BY MOUTH EVERY DAY IN THE EVENING 90 tablet 1    clopidogrel (PLAVIX) 75 mg tablet Take 1 tablet (75 mg total) by mouth daily 90 tablet 3    diphenhydrAMINE (BENADRYL) 25 mg capsule Take 25 mg by mouth every 6 (six) hours as needed for itching      dofetilide (TIKOSYN) 125 mcg capsule Take 1 capsule (125 mcg total) by mouth every 12 (twelve) hours 180 capsule 3    Empagliflozin (Jardiance) 25 MG TABS Take 1 tablet (25 mg total) by mouth every morning 30 tablet 17    ergocalciferol (VITAMIN D2) 50,000 units Take 1 capsule (50,000 Units total) by mouth once a week 12 capsule 1    fluticasone (FLONASE) 50 mcg/act nasal spray 1 spray into each nostril daily (Patient taking differently: 1 spray into each nostril daily PRN) 9.9 mL 0    furosemide (LASIX) 20 mg tablet PLEASE SEE ATTACHED FOR DETAILED DIRECTIONS      furosemide (LASIX) 40 mg tablet Take 1 tablet (40 mg total) by mouth 2 (two) times a day 60 tablet 17    glimepiride (AMARYL) 1 mg tablet Take 1 tablet (1 mg total) by mouth daily with breakfast 90 tablet 3    HYDROcodone-acetaminophen (NORCO) 5-325 mg per tablet Take 1 tablet by mouth every 6 (six) hours as needed for pain for up to 10 doses Max Daily Amount: 4 tablets 10 tablet 0    isosorbide mononitrate (IMDUR) 30 mg 24 hr tablet Take 1 tablet (30 mg total) by mouth 2 (two) times a day 30 mg in the am and 30 mg in the pm 60 tablet 17    levothyroxine (Synthroid) 75 mcg tablet Take 1 tablet (75 mcg total) by mouth daily 90 tablet 3    meclizine (ANTIVERT) 25 mg tablet Take 1 tablet (25 mg total) by mouth every 8 (eight) hours as needed for dizziness 30 tablet 0    metoprolol succinate (TOPROL-XL) 25 mg 24 hr tablet Take 2 tablets (50 mg total) by mouth daily at bedtime 180 tablet 5     nitroglycerin (NITROSTAT) 0.4 mg SL tablet Place 1 tablet (0.4 mg total) under the tongue every 5 (five) minutes as needed for chest pain 30 tablet 0    pantoprazole (PROTONIX) 40 mg tablet TAKE 1 TABLET BY MOUTH 2 TIMES A DAY BEFORE MEALS. 180 tablet 1    potassium chloride (Klor-Con M20) 20 mEq tablet TAKE 1 TABLET BY MOUTH EVERY DAY 90 tablet 1    pregabalin (LYRICA) 50 mg capsule Take 1 capsule (50 mg total) by mouth 3 (three) times a day 90 capsule 2    sacubitril-valsartan (Entresto) 49-51 MG TABS Take 1 tablet by mouth 2 (two) times a day 60 tablet 5    semaglutide, 0.25 or 0.5 mg/dose, (Ozempic, 0.25 or 0.5 MG/DOSE,) 2 mg/3 mL injection pen Inject 0.375 mL (0.25 mg total) under the skin every 7 days for 28 days, THEN 0.75 mL (0.5 mg total) every 7 days for 28 days. 9 mL 0    [START ON 2/17/2025] semaglutide, 1 mg/dose, (Ozempic) 4 mg/3 mL injection pen Inject 0.75 mL (1 mg total) under the skin once a week for 28 days Do not start before February 17, 2025. 3 mL 0    [START ON 3/17/2025] semaglutide, 2 mg/dose, (Ozempic) 8 mg/ mL injection pen Inject 0.75 mL (2 mg total) under the skin every 7 days Do not start before March 17, 2025. (Patient not taking: Reported on 1/3/2025 Do not start before March 17, 2025.) 9 mL 1    traMADol (ULTRAM) 50 mg tablet Take 1 tablet (50 mg total) by mouth every 6 (six) hours as needed for moderate pain or severe pain 20 tablet 0     No current facility-administered medications for this visit.       Past Surgical History:   Procedure Laterality Date    ADENOIDECTOMY      APPENDECTOMY      APPENDECTOMY LAPAROSCOPIC N/A 05/08/2024    Procedure: APPENDECTOMY LAPAROSCOPIC;  Surgeon: Lauryn Ullrich, DO;  Location: [facility];  Service: General    BACK SURGERY  8/23    BREAST EXCISIONAL BIOPSY Right 09/2000    cyst removed- benign    CARDIAC CATHETERIZATION N/A 03/22/2024    Procedure: Cardiac pci;  Surgeon: Gabriel Myers MD;  [facility];  Service: Cardiology     CARDIAC CATHETERIZATION N/A 2024    Procedure: Cardiac Coronary Angiogram;  Surgeon: Gabriel Myers MD;  [facility];  Service: Cardiology    CARDIAC CATHETERIZATION N/A 2024    Procedure: Cardiac PCI AMI;  Surgeon: Gabriel Myers MD;  [facility];  Service: Cardiology    CARDIAC CATHETERIZATION N/A 2024    Procedure: Cardiac IVUS;  Surgeon: Gabriel Myers MD;  [facility];  Service: Cardiology    CARDIAC DEFIBRILLATOR PLACEMENT      CARDIAC ELECTROPHYSIOLOGY PROCEDURE N/A 2024    Procedure: Cardiac icd implant;  Surgeon: Jeffy Lama MD;  [facility];  Service: Cardiology     SECTION      COLONOSCOPY      ECTOPIC PREGNANCY SURGERY      INSERT / REPLACE / REMOVE PACEMAKER      MASS EXCISION Left 10/18/2024    Procedure: EXCISION BIOPSY TISSUE LESION/MASS UPPER EXTREMITY - Left index finger;  Surgeon: Leandro Campos MD;  Location:  MAIN OR;  Service: Orthopedics    SEPTOPLASTY      SPINE SURGERY  23    TONSILLECTOMY         Social History     Socioeconomic History    Marital status:      Spouse name: Not on file    Number of children: 3    Years of education: Not on file    Highest education level: Not on file   Occupational History    Not on file   Tobacco Use    Smoking status: Former     Current packs/day: 0.00     Average packs/day: 1 pack/day for 24.2 years (24.2 ttl pk-yrs)     Types: Cigarettes     Start date: 2000     Quit date: 3/22/2024     Years since quittin.8     Passive exposure: Never    Smokeless tobacco: Never    Tobacco comments:     Smoked 0.5-1 ppd; quit in 2024.    Vaping Use    Vaping status: Never Used   Substance and Sexual Activity    Alcohol use: Never     Alcohol/week: 1.0 standard drink of alcohol     Types: 1 Shots of liquor per week     Comment: Every 2or 3months    Drug use: Never    Sexual activity: Not on file   Other Topics Concern    Not on file   Social History  Narrative    Not on file     Social Drivers of Health     Financial Resource Strain: Low Risk  (2023)    [facility]    Overall Financial Resource Strain (CARDIA)     Difficulty of Paying Living Expenses: Not hard at all   Food Insecurity: No Food Insecurity (2024)    Nursing - Inadequate Food Risk Classification     Worried About Running Out of Food in the Last Year: Never true     Ran Out of Food in the Last Year: Never true     Ran Out of Food in the Last Year: Never true   Transportation Needs: No Transportation Needs (2024)    Nursing - Transportation Risk Classification     Lack of Transportation: Not on file     Lack of Transportation: No   Physical Activity: Not on file   Stress: No Stress Concern Present (2023)    [facility]    [employer] - Occupational Stress Questionnaire     Feeling of Stress : Not at all   Social Connections: Unknown (2024)    Received from [employer]    Social Connections     How often do you feel lonely or isolated from those around you? (Adult - for ages 18 years and over): Not on file   Intimate Partner Violence: Unknown (2024)    Nursing IPS     Feels Physically and Emotionally Safe: Not on file     Physically Hurt by Someone: Not on file     Humiliated or Emotionally Abused by Someone: Not on file     Physically Hurt by Someone: No     Hurt or Threatened by Someone: No   Housing Stability: Unknown (2024)    Nursing: Inadequate Housing Risk Classification     Has Housing: Not on file     Worried About Losing Housing: Not on file     Unable to Get Utilities: Not on file     Unable to Pay for Housing in the Last Year: No     Has Housin   Recent Concern: Housing Stability - High Risk (2024)    Housing Stability Vital Sign     Unable to Pay for Housing in the Last Year: No     Number of  "Times Moved in the Last Year: 2     Homeless in the Last Year: No       Allergies   Allergen Reactions    Fish-Derived Products - Food Allergy Anaphylaxis     Cannot have all seafood products    Shellfish-Derived Products - Food Allergy Anaphylaxis    Azithromycin Hives and Rash       Review of Systems  General- denies fever/chills  HEENT- denies hearing loss or sore throat  Eyes- denies eye pain or visual disturbances, denies red eyes  Respiratory- denies cough or SOB  Cardio- denies chest pain or palpitations  GI- denies abdominal pain  Endocrine- denies urinary frequency  Urinary- denies pain with urination  Musculoskeletal- Negative except noted above  Skin- denies rashes or wounds  Neurological- denies dizziness or headache  Psychiatric- denies anxiety or difficulty concentrating    Physical Exam  Ht 5' 8\" (1.727 m)   Wt 103 kg (227 lb 1.2 oz)   BMI 34.53 kg/m²     General/Constitutional: No apparent distress: well-nourished and well developed.  Lymphatic: No appreciable lymphadenopathy  Respiratory: Non-labored breathing  Vascular: No edema, swelling or tenderness, except as noted in detailed exam.  Integumentary: No impressive skin lesions present, except as noted in detailed exam.  Psych: Normal mood and affect, oriented to person, place and time.  MSK: normal other than stated in HPI and exam  Gait & balance: no evidence of myelopathic gait, ambulates with a Cane    Lumbar spine range of motion:  -Forward flexion to 45  -Extension to neutral  -Lateral bend 15 right, 15 left  -Rotation 5 right, 5 left  There is tenderness with palpation along lumbar paraspinal musculature, no midline tenderness , well-healed surgical sites    Neurologic:    Lower Extremity Motor Function    Right  Left    Iliopsoas  5/5  4/5    Quadriceps 5/5 4/5   Tibialis anterior  5/5  4/5    EHL  5/5  3+/5    Gastroc. muscle  5/5  2/5    Heel rise  5/5  2/5    Toe rise  5/5  2/5      Sensory: light touch is intact to bilateral upper " "and lower extremities     Reflexes: Intact/diminished    Other tests:  Straight Leg Raise: positive  Jazmyn SI: positive  MAGNO SI: negative  Greater troch: no tenderness   Internal/external hip ROM: intact, no pain   Flexion/extension knee ROM: intact, no pain   Vascular: WWP extremities    Diagnostic Tests   IMAGING: I have personally reviewed the images and these are my findings:  Lumbar Spine X-rays from 8/20/24: multi level lumbar spondylosis, L3-S1 bilateral pedicle screw and aliya construct with obvious loosening of the L3 screws, the right L4 screw is misplaced and not within the pedicle, L4-5 spondylolisthesis    Lumbar Spine MRI from 3/18/2024: L3-S1 fusion construct with no significant central or foraminal stenosis appreciated    CT abdomen and pelvis from 5/7/2024: L3-S1 fusion construct with gross loosening of the L3 screws with halo appearance around bilateral screws, L4 screws misplaced and not within the pedicle, the bilateral L5 screws appear to have lateral cortex violation, screw loosening of the S1 left pedicle screw with halo appearance    Lumbar Spine MRI from 2/4/25: L3-S1 fusion construct with no significant central or foraminal stenosis appreciated    Procedures, if performed today     None performed       Portions of the record may have been created with voice recognition software.  Occasional wrong word or \"sound a like\" substitutions may have occurred due to the inherent limitations of voice recognition software.  Read the chart carefully and recognize, using context, where substitutions have occurred.   "

## 2025-02-10 ENCOUNTER — OFFICE VISIT (OUTPATIENT)
Dept: GASTROENTEROLOGY | Facility: CLINIC | Age: 65
End: 2025-02-10
Payer: COMMERCIAL

## 2025-02-10 VITALS
HEIGHT: 68 IN | WEIGHT: 224 LBS | SYSTOLIC BLOOD PRESSURE: 110 MMHG | DIASTOLIC BLOOD PRESSURE: 68 MMHG | HEART RATE: 64 BPM | BODY MASS INDEX: 33.95 KG/M2

## 2025-02-10 DIAGNOSIS — Z12.11 SCREENING FOR COLON CANCER: ICD-10-CM

## 2025-02-10 DIAGNOSIS — Z87.11 HISTORY OF PEPTIC ULCER DISEASE: Primary | ICD-10-CM

## 2025-02-10 DIAGNOSIS — K25.9 MULTIPLE GASTRIC ULCERS: ICD-10-CM

## 2025-02-10 PROCEDURE — 99213 OFFICE O/P EST LOW 20 MIN: CPT | Performed by: PHYSICIAN ASSISTANT

## 2025-02-10 NOTE — PROGRESS NOTES
Name: Vesna Langston      : 1960      MRN: [id number]  Encounter Provider: Kassi High PA-C  Encounter Date: 2/10/2025   Encounter department: [facility] [place]  :  Assessment & Plan  History of peptic ulcer disease  Patient stable and doing well on pantoprazole 40 mg twice daily before meals.  No complaints today or alarming symptoms.  She is improving from a cardiac standpoint thankfully and is so pleased.  Previously ordered/scheduled EGD was canceled as it was felt that the risks might not outweigh the benefits when it comes to repeat EGD to ensure ulcer healing.  This case was reviewed with Dr. Alvarado GI attending.  Patient continues to be on both Plavix and Eliquis and when these medications were held prior to appendectomy several months ago patient did develop a small PE.  She continues to be high risk for procedures so we will plan for ongoing conservative management with PPI twice daily.  She is very comfortable with this and would also like to avoid procedures if at all possible.  She will continue to avoid NSAIDs. All questions were answered. Patient was in agreement with plan. Patient was told to call the office with any questions, concerns, new or worsening GI symptoms        Multiple gastric ulcers  As above.        Screening for colon cancer  It is unclear if patient completed Cologuard, will discuss colorectal cancer screening in follow-up.         Patient was instructed to call the office with any questions, concerns, new/ worsening/ persisting GI symptoms. Advised patient go to the ER with any severe or worsening abdominal pain, fevers/ chills, intractable N/V, chest pain, SOB, dizziness, lightheadedness, feeling something stuck in esophagus that will not go down. Patient expressed understanding and is in agreement with treatment plan.     Will plan to follow up 6 months    History of Present Illness   HPI  Vesna Langston is a 64 y.o. female who with a  past medical history of a flutter on Eliquis, ischemic cardiomyopathy withheart failure with reduced ejection fraction with ICD in place (placed in 3/2024), coronary artery disease with cardiac stent placement 3/2024 on Plavix hypothyroidism, type 2 diabetes mellitus, anxiety, history of tobacco abuse, hyperlipidemia presents to the office today for follow-up.  Patient was last seen in the office by me 8/9/2024, previous office note was reviewed.     Patient reports feeling well overall. No new complaints today.   She is taking pantoprazole 40 mg BID before meals.   Bms are regular, formed.   She is so pleased. She tells me she is dealing with back issues. She also tells me her cardiac EF increased from 20% to 40%.   She is gaining weight, eating and drinking well.   Patient denies any current or recent fevers/ chills, abdominal pain, nausea, vomiting, change in bowel habits, diarrhea, constipation, black or bloody stools, heartburn, dysphagia, odynophagia.   Denies chest pain, SOB, dizziness     She is avoiding NSAIDs.  Previously seen gastric ulcers were likely NSAID induced as patient had been using Motrin heavily for the last 1 and half years prior to hospitalization for GI bleeding due to back pain.    Patient continues to be on both Plavix and Eliquis and when these medications were held prior to appendectomy patient did develop a small PE.    Review of Systems   Constitutional:  Negative for chills and fever.   HENT:  Negative for ear pain and sore throat.    Eyes:  Negative for pain and visual disturbance.   Respiratory:  Negative for cough and shortness of breath.    Cardiovascular:  Negative for chest pain and palpitations.   Gastrointestinal:  Negative for abdominal pain and vomiting.   Genitourinary:  Negative for dysuria and hematuria.   Musculoskeletal:  Negative for arthralgias and back pain.   Skin:  Negative for color change and rash.   Neurological:  Negative for seizures and syncope.   All other  "systems reviewed and are negative.    Objective   /68   Pulse 64   Ht 5' 8\" (1.727 m)   Wt 102 kg (224 lb)   BMI 34.06 kg/m²      Physical Exam  Vitals reviewed.   Constitutional:       General: She is not in acute distress.     Appearance: She is obese. She is not toxic-appearing.   HENT:      Head: Normocephalic and atraumatic.   Eyes:      Extraocular Movements: Extraocular movements intact.      Conjunctiva/sclera: Conjunctivae normal.   Cardiovascular:      Rate and Rhythm: Normal rate and regular rhythm.   Pulmonary:      Effort: Pulmonary effort is normal. No respiratory distress.      Breath sounds: Normal breath sounds.   Abdominal:      General: Bowel sounds are normal.      Palpations: Abdomen is soft.      Tenderness: There is no abdominal tenderness.   Musculoskeletal:         General: No swelling or tenderness.      Cervical back: Normal range of motion and neck supple.   Skin:     General: Skin is warm and dry.      Coloration: Skin is not jaundiced.   Neurological:      General: No focal deficit present.      Mental Status: She is alert and oriented to person, place, and time. Mental status is at baseline.   Psychiatric:         Mood and Affect: Mood normal.         Behavior: Behavior normal.         Thought Content: Thought content normal.       Lab Results   Component Value Date    WBC 8.40 01/14/2025    HGB 12.2 01/14/2025    HCT 38.8 01/14/2025    MCV 84 01/14/2025     01/14/2025       Lab Results   Component Value Date    SODIUM 138 01/14/2025    K 4.1 01/14/2025     01/14/2025    CO2 27 01/14/2025    AGAP 7 01/14/2025    BUN 17 01/14/2025    CREATININE 0.97 01/14/2025    GLUC 97 12/21/2024    GLUF 116 (H) 01/14/2025    CALCIUM 9.7 01/14/2025    AST 17 01/14/2025    ALT 18 01/14/2025    ALKPHOS 50 01/14/2025    TP 7.2 01/14/2025    TBILI 0.66 01/14/2025    EGFR 61 01/14/2025       "

## 2025-02-11 ENCOUNTER — TELEPHONE (OUTPATIENT)
Age: 65
End: 2025-02-11

## 2025-02-11 NOTE — PROGRESS NOTES
"Advanced Heart Failure/Pulmonary Hypertension Outpatient Note - Vesna Langston 64 y.o. female MRN: [id number]    @ Encounter: [id number]    Assessment:  64 y.o. female PMH and acute problems listed later in this note (a partial list may also be included within 'assessment' section) p/w HF fu.  I first met Vesna Langston on 5/6/24.    Primarily ICM, HFimpEF, LVEF 30-35%>PCI+gdmt>40-45% 10/2024 TTE, nondilated LV  LHC 03/22/2024: s/p PCI to 100% stenosis of ostial/proximal LAD. No other residual dz elsewhere.  cMRI 03/26/2024: LVEF 20%. LVIDd 4.5 cm. \"Transmural scarring of the mid anterior, anteroseptal and inferoseptal walls, the apical anterior, septal and inferior walls and the apex.\"   Coronary artery disease  S/p MI in 03/2024; received PCI to 100% stenosis of ostial/proximal LAD. No residual dz elsewhere.  History of monomorphic ventricular tachycardia              Per EP notes, felt to be scar mediated.               S/p secondary prevention ICD.               was on amiodarone per EP.  transitioned to dofetilide 125mcg twice daily 11/2024 during elective tikosyn admit.  Atrial flutter, paroxysmal               Anticoagulation on Eliquis.   PE 5/2024. CTA: Single subsegmental right lower lobe pulmonary embolus as shown on abdominal CT.   Hyperlipidemia  Diabetes mellitus, type II  Chronic back pain, multiple past surgeries, follows surgical team in [facility] [place]. Dr. Pena  History of tobacco abuse  4/7/24 CT: IMPRESSION:  Suspected small hemorrhage from the anterior wall of the distal gastric body with focal wall thickening. Bleeding due to underlying lesion or PUD is suspected. Recommend endoscopic correlation.  Past heavy NSAID use, 2024 stopped  Lung and spleen granulomas.  Works in [employer], no heavy lifting she says      I have reviewed all pertinent patient data including but not limited to:        Lab Units 01/14/25  0642 12/27/24  0638 12/21/24  1948   CREATININE mg/dL 0.97 1.05 1.12               Lab " "Results   Component Value Date    K 4.1 01/14/2025     Lab Results   Component Value Date    HGBA1C 7.1 (H) 12/27/2024     Lab Results   Component Value Date    FSJ3SBFRJZTJ 5.216 (H) 01/14/2025     Lab Results   Component Value Date    LDLCALC 88 12/27/2024     Lab Results   Component Value Date    BNP 78 11/30/2024      No results found for: \"NTBNP\"       Home scale dry weight 220lbs  When decompensated she has been 227lbs    TODAY'S PLAN:     02/14/25  Warm, euvolemic  No new cardiac complaints, feels generally well, better than prior  Remains quite active  Ongoing lumbago and upcoming surgery 5/2025 > will return for closer pre op eval  Using lasix prn now  Tolerating Reattempt at sglt2i so far    Cw imdur 30 bid  Cw prn nitro SL (CP usually better after 1 SL nitro  Further antianginals as indicated, and better BP control (note she has also had hypotension and dizziness in past causing cessation of certain elements of gdmt)    Gdmt below  Limited by hypotension in past, her regimen has been interrupted intermittently due to hypotension in 2024  No changes today  Has ICD for VT  No MR    On AC+plavix; ideally no pause until 3/2025 for any non-urgent surgical reason  On high intens statin    On tikosyn per EP    Deranged Thyroids and DM per PCP    Follows with sleep medicine, planned on Tx for POP, has not started yet per prior notes - advised begin planned Tx as soon as able on multiple occasions     Follow up:  With me in 2/2025 as planned, or sooner if symptoms evolve  In addition to follow up with their other medical providers    Key info from my prior notes:    She usually takes lasix 40 bid with self titration for weight gain and SOB. She is completing 4 day burst of lasix 80 bid for 7 lbs weight gain over 5 days (no diet indiscretion). Then she will return to 40 bid.  She requests to reattempt jardiance now, which will also have some diuretic effect. She felt well on this drug in past.  Extensive " discussions about risks vs benefits as previously documented. She has had UTI in past causing sglt2i cessation, she recognizes risks and wishes to reattempt 1/2025.    Strict RTC precautions given  Fu in 2 weeks  She will call in 4-5 days with symptom check in  Low threshold repeat echo  Doubt new PE but monitor closely    Home weight up 217 > 220lbs today, feels mild leg swelling, bloating; has been much more SOB x 3 days which began suddenly - previously felt very well for first few days after recent discharge (had elective tiksoyn initiation admit). Has ongoing unremitting LUE numbness from inner biceps area to hand (not more proximally) - ongoing x days after moved boxes with grandkids in attic during weekend, up to 40lbs.  No new chest pain  No syncope  Worsening hypotension  Minimal salt intake, drinks about 50-55 oz water daily she says  She was treated for yeast infx x 3days per PCP, she says her symptoms entirely resolved, no new pain, no fevers.   She does note she is recovering from sinusitis.    Messaged EP team today regarding QTc trend  I cannot see their ECG from today in muse or epic media tab, priors borderline  Fu EP for tikosyn plan    Will need to back off gdmt at least for now 2/2 hypotension and her Sx, reductions as below    Now seeing nutritionist, no longer doing extreme caloric restriction to 800 cals per day, as she was in recent past  Hydrating better    She has been consuming only 800 calories a day for 1 month or longer in an extreme attempt to lose weight > advised against this. Nutrition referral given today.    Trial imdur for any component of angina  Then 1 week later up entresto and fu BMP afterwards; this will also help with volume management  Cw lasix 20 qd standing for now, which is recent increase from prior    Warm, euvolemic  Has increased lasix 20 prn to qdaily standing for past 1 week for increased weight, mild sob, mild edema of feet - no major improvement.  Today is last  minute urgent visit for ED visit 8/25 for CP in setting of home  transiently, ACS ruled out. Then 8/26 yesterday had episode of vomiting, faintness, no LOC, no chest pain, has felt better since then. No ICD shocks, no palpitations. Did not improve with meclizine. Normal-higher blood sugars at home recently/during events.  Very rare opioid use she says for her back pain issues, none since 1-2 weeks ago    Follows GI    Discussed therapeutic lifestyle changes, low-moderate intensity exercise, maintain acceptable hydration 64 oz water daily, salt restrict, Mediterranean vs DASH diet, weight loss  Avoid nsaids    Further review of return to work next week  Safest plan would be no driving x 3 mo - resume 6/2024 if remains stable    Pharmacotherapies / Neurohormonal Blockade:  --Beta Blocker: metoprolol succinate 50 qd  --ARNi / ACEi / ARB: entresto 49/51 bid > pause 12/2/24 for hypotension (near term resumption planned)>resume entresto 49/51 bid 12/13/24 (low threshold reduce dose)  --Aldosterone Antagonist: aldactone 25 qd > pause 12/2/24 for hypotension, consider resumption future  --SGLT2 Inhibitor: jardiance 25 qd for HF and DM > subsequently Sglt2i stopped in early 2024 2/2 yeast infx>9/10/24 Re-attempt lower sglt2i dose now, jardiance 10 qd, long discussion risks vs benefits and this is pt wish> DC 11/2024 2/2 recurrent yeast infx (may consider repeat re-attempt future, pt indicates desire for this 12/2/24) > plan above, pt requests reattempt 1/3/25, resumed 25 qd    --Diuretic: lasix 40 bid, potassium 20 qd > she has moved to as needed for both as of 2025  Long discussion about evidence of worsening HF, when to self uptitrate home diuretic and call cardiology office     Sudden Cardiac Death Risk Reduction:  --Medtronic dual chamber ICD in situ since 03/2024 (secondary prevention).   2/2025  MDT DUAL ICD (MVP ON) / ACTIVE SYSTEM IS MRI CONDITIONAL   CARELINK TRANSMISSION: BATTERY VOLTAGE ADEQUATE (12 YRS).  AP-47%, <0.1%. ALL AVAILABLE LEAD PARAMETERS WITHIN NORMAL LIMITS. NO SIGNIFICANT HIGH RATE EPISODES. OPTI-VOL WITHIN NORMAL LIMITS. NORMAL DEVICE FUNCTION.   Electrical Resynchronization:  --Candidacy for BiV device: narrow QRS.   Advanced Therapies: Will continue to monitor.    Studies:  I have reviewed all pertinent patient data/labs/imaging where available, including but not limited to the below studies. Selected results may be displayed here but comprehensive listing is omitted for note clarity and can be found in the epic chart.    ECG.    Echo.    Stress.    Cath.    HPI:   64 y.o. female PMH and acute problems listed later in this note (a partial list may also be included within 'assessment' section) p/w HF fu.  I first met Vesna Langston on 5/6/24.  No new CP/SOB/dizziness/palpitations/syncope.  No new fatigue.  No new unintentional weight changes.  No new leg swelling, PND, pillow orthopnea.  No new fevers, chills, cough, nausea, vomiting, diarrhea, dysuria.      Interval History:  As noted in 'plan' section above and prior epic chart notes.    No new dizziness/palpitations/syncope.  No new fatigue.  No new PND, pillow orthopnea.  No new fevers, chills, cough, nausea, vomiting, diarrhea, dysuria.    Past Medical History:   Diagnosis Date    Acute respiratory insufficiency 03/22/2024    Allergic     Chronic HFrEF (heart failure with reduced ejection fraction) ([facility])     Coronary artery disease     COVID-19 virus infection 11/28/2022    Diabetes mellitus (H[facility])     Disease of thyroid gland 4/09/2024    Heart disease 3/24    Hyperlipidemia     Hypoglycemia     ICD (implantable cardioverter-defibrillator) in place     Ischemic cardiomyopathy     Lactic acidosis 03/22/2024    Myocardial infarction ([facility]) 3/22/2024    Otitis media 1966    Seasonal allergies     ST elevation myocardial infarction involving left anterior descending (LAD) coronary artery ([facility]) 03/22/2024    Tobacco abuse     Visual impairment 1967      Patient Active Problem List    Diagnosis Date Noted    Chronic kidney disease, stage 4 (severe) ([facility]) 01/03/2025    Other pulmonary embolism without acute cor pulmonale, unspecified chronicity ([facility]) 01/03/2025    Acute on chronic systolic (congestive) heart failure ([facility]) 01/03/2025    Obesity, morbid ([facility]) 01/03/2025    Type 2 diabetes mellitus with stage 3 chronic kidney disease, without long-term current use of insulin, unspecified whether stage 3a or 3b CKD ([facility]) 01/03/2025    Numbness and tingling in left arm 11/30/2024    Primary osteoarthritis of right knee 10/08/2024    History of torn meniscus of right knee 10/08/2024    Chronic pain of right knee 10/08/2024    Localized edema 10/02/2024    POP (obstructive sleep apnea) 09/23/2024    Class 1 obesity due to excess calories with serious comorbidity and body mass index (BMI) of 34.0 to 34.9 in adult 08/27/2024    Ganglion cyst of finger of left hand 08/15/2024    History of pulmonary embolism 05/07/2024    History of gastric ulcer 05/07/2024    Acquired hypothyroidism 04/19/2024    Type 2 diabetes mellitus, without long-term current use of insulin ([facility]) 04/07/2024    Syncope 04/01/2024    S/P ICD (internal cardiac defibrillator) procedure 03/27/2024    Chronic low back pain 03/25/2024    HFrEF (heart failure with reduced ejection fraction) ([facility]) 03/25/2024    Ischemic cardiomyopathy 03/24/2024    Coronary artery disease involving native coronary artery of native heart 03/23/2024    S/P coronary artery stent placement 03/23/2024    Atrial flutter, paroxysmal ([facility]) 03/22/2024    Mixed hyperlipidemia 03/22/2024    History of tobacco abuse 03/22/2024    History of sustained ventricular tachycardia 03/22/2024    Back pain 07/31/2022    Sciatica of left side 07/31/2022       ROS:  10 point ROS negative except as specified in HPI/interval history    Allergies   Allergen Reactions    Fish-Derived Products - Food Allergy Anaphylaxis     Cannot have all seafood  products    Shellfish-Derived Products - Food Allergy Anaphylaxis    Azithromycin Hives and Rash     Current Outpatient Medications   Medication Instructions    albuterol (ProAir HFA) 90 mcg/act inhaler 2 puffs, Inhalation, Every 6 hours PRN    apixaban (ELIQUIS) 5 mg, Oral, 2 times daily    atorvastatin (LIPITOR) 80 mg, Oral, Every evening    clopidogrel (PLAVIX) 75 mg, Oral, Daily    diphenhydrAMINE (BENADRYL) 25 mg, Every 6 hours PRN    dofetilide (TIKOSYN) 125 mcg, Oral, Every 12 hours scheduled    Empagliflozin (JARDIANCE) 25 mg, Oral, Every morning    ergocalciferol (VITAMIN D2) 50,000 Units, Oral, Weekly    fluticasone (FLONASE) 50 mcg/act nasal spray 1 spray, Nasal, Daily    furosemide (LASIX) 20 mg tablet PLEASE SEE ATTACHED FOR DETAILED DIRECTIONS    furosemide (LASIX) 40 mg, Oral, As needed    glimepiride (AMARYL) 1 mg, Oral, Daily with breakfast    isosorbide mononitrate (IMDUR) 30 mg, Oral, 2 times daily, 30 mg in the am and 30 mg in the pm    levothyroxine (SYNTHROID) 75 mcg, Oral, Daily    meclizine (ANTIVERT) 25 mg, Oral, Every 8 hours PRN    metoprolol succinate (TOPROL-XL) 50 mg, Oral, Daily at bedtime    nitroglycerin (NITROSTAT) 0.4 mg, Sublingual, Every 5 minutes PRN    pantoprazole (PROTONIX) 40 mg, Oral, 2 times daily before meals    potassium chloride (Klor-Con M20) 20 mEq tablet 20 mEq, Oral, As needed    pregabalin (LYRICA) 50 mg, Oral, 3 times daily    sacubitril-valsartan (Entresto) 49-51 MG TABS 1 tablet, Oral, 2 times daily    [START ON 2/17/2025] semaglutide (1 mg/dose) (OZEMPIC) 1 mg, Subcutaneous, Weekly    [START ON 3/17/2025] semaglutide (2 mg/dose) (OZEMPIC) 2 mg, Subcutaneous, Every 7 days    semaglutide, 0.25 or 0.5 mg/dose, (Ozempic, 0.25 or 0.5 MG/DOSE,) 2 mg/3 mL injection pen Inject 0.375 mL (0.25 mg total) under the skin every 7 days for 28 days, THEN 0.75 mL (0.5 mg total) every 7 days for 28 days.      Social History     Socioeconomic History    Marital status:       Spouse name: Not on file    Number of children: 3    Years of education: Not on file    Highest education level: Not on file   Occupational History    Not on file   Tobacco Use    Smoking status: Former     Current packs/day: 0.00     Average packs/day: 1 pack/day for 24.2 years (24.2 ttl pk-yrs)     Types: Cigarettes     Start date: 2000     Quit date: 3/22/2024     Years since quittin.9     Passive exposure: Never    Smokeless tobacco: Never    Tobacco comments:     Smoked 0.5-1 ppd; quit in 2024.    Vaping Use    Vaping status: Never Used   Substance and Sexual Activity    Alcohol use: Never     Alcohol/week: 1.0 standard drink of alcohol     Types: 1 Shots of liquor per week     Comment: Every 2or 3months    Drug use: Never    Sexual activity: Not on file   Other Topics Concern    Not on file   Social History Narrative    Not on file     Social Drivers of Health     Financial Resource Strain: Low Risk  (2023)    [facility]    Overall Financial Resource Strain (CARDIA)     Difficulty of Paying Living Expenses: Not hard at all   Food Insecurity: No Food Insecurity (2024)    Nursing - Inadequate Food Risk Classification     Worried About Running Out of Food in the Last Year: Never true     Ran Out of Food in the Last Year: Never true     Ran Out of Food in the Last Year: Never true   Transportation Needs: No Transportation Needs (2024)    Nursing - Transportation Risk Classification     Lack of Transportation: Not on file     Lack of Transportation: No   Physical Activity: Not on file   Stress: No Stress Concern Present (2023)    [facility]    [facility] [employer] [facility] - Occupational Stress Questionnaire     Feeling of Stress : Not at all   Social Connections: Unknown (2024)    Received from [facility]     How often do  "you feel lonely or isolated from those around you? (Adult - for ages 18 years and over): Not on file   Intimate Partner Violence: Unknown (2024)    Nursing IPS     Feels Physically and Emotionally Safe: Not on file     Physically Hurt by Someone: Not on file     Humiliated or Emotionally Abused by Someone: Not on file     Physically Hurt by Someone: No     Hurt or Threatened by Someone: No   Housing Stability: Unknown (2024)    Nursing: Inadequate Housing Risk Classification     Has Housing: Not on file     Worried About Losing Housing: Not on file     Unable to Get Utilities: Not on file     Unable to Pay for Housing in the Last Year: No     Has Housin   Recent Concern: Housing Stability - High Risk (2024)    Housing Stability Vital Sign     Unable to Pay for Housing in the Last Year: No     Number of Times Moved in the Last Year: 2     Homeless in the Last Year: No     Family History   Adopted: Yes   Problem Relation Age of Onset    Depression Mother     Heart disease Father     OCD Daughter      Physical Exam:  Vitals:    25 1040   BP: 108/64   BP Location: Left arm   Patient Position: Sitting   Cuff Size: Standard   Pulse: 70   SpO2: 98%   Weight: 101 kg (222 lb)   Height: 5' 8\" (1.727 m)       Constitutional: NAD, non toxic  Ears/nose/mouth/throat: atraumatic  CV: RRR, nl S1S2, no murmurs/rubs/gallups, no JVD, no HJR  Resp: CTABL  GI: Soft, NTND  MSK: no swollen joints in exposed areas  Extr: No edema, warm LE  Pysche: Normal affect  Neuro: appropriate in conversation  Skin: dry and intact in exposed areas    Labs & Results:  Lab Results   Component Value Date    SODIUM 138 2025    K 4.1 2025     2025    CO2 27 2025    BUN 17 2025    CREATININE 0.97 2025    GLUC 97 2024    CALCIUM 9.7 2025     Lab Results   Component Value Date    WBC 8.40 2025    HGB 12.2 2025    HCT 38.8 2025    MCV 84 2025     " 01/14/2025       Counseling / Coordination of Care  Greater than 50% of total time was spent with the patient and / or family counseling and / or coordination of care.  We discussed diagnoses, most recent studies, tests and any changes in treatment plan.    Thank you for the opportunity to participate in the care of this patient.    Vitor Bell MD  Attending Physician  Advanced Heart Failure and Transplant Cardiology  [facility]

## 2025-02-11 NOTE — TELEPHONE ENCOUNTER
Caller: Patient     Doctor: Dr. Mcrae      Reason for call: Asked about a response from her [place] message on 2/5/25. She also asked about when will be the surgery ?     Call back#: [phone]  
- - -

## 2025-02-12 ENCOUNTER — TELEPHONE (OUTPATIENT)
Age: 65
End: 2025-02-12

## 2025-02-12 DIAGNOSIS — I25.118 CORONARY ARTERY DISEASE OF NATIVE HEART WITH STABLE ANGINA PECTORIS, UNSPECIFIED VESSEL OR LESION TYPE (HCC): ICD-10-CM

## 2025-02-12 RX ORDER — ISOSORBIDE MONONITRATE 30 MG/1
30 TABLET, EXTENDED RELEASE ORAL 2 TIMES DAILY
Qty: 180 TABLET | Refills: 1 | Status: SHIPPED | OUTPATIENT
Start: 2025-02-12 | End: 2026-08-06

## 2025-02-12 NOTE — TELEPHONE ENCOUNTER
Caller: patient    Doctor: Dr. Mcrae    Reason for call: patient is requesting a note to be put in for her job stating that she is going to receive sx on 5/1/25    Call back#: [phone]

## 2025-02-13 DIAGNOSIS — Z86.79 HISTORY OF SUSTAINED VENTRICULAR TACHYCARDIA: Chronic | ICD-10-CM

## 2025-02-13 RX ORDER — DOFETILIDE 0.12 MG/1
125 CAPSULE ORAL EVERY 12 HOURS SCHEDULED
Qty: 180 CAPSULE | Refills: 3 | Status: SHIPPED | OUTPATIENT
Start: 2025-02-13

## 2025-02-14 ENCOUNTER — OFFICE VISIT (OUTPATIENT)
Dept: CARDIOLOGY CLINIC | Facility: CLINIC | Age: 65
End: 2025-02-14
Payer: COMMERCIAL

## 2025-02-14 VITALS
WEIGHT: 222 LBS | BODY MASS INDEX: 33.65 KG/M2 | HEIGHT: 68 IN | DIASTOLIC BLOOD PRESSURE: 64 MMHG | OXYGEN SATURATION: 98 % | HEART RATE: 70 BPM | SYSTOLIC BLOOD PRESSURE: 108 MMHG

## 2025-02-14 DIAGNOSIS — I25.5 ISCHEMIC CARDIOMYOPATHY: Primary | Chronic | ICD-10-CM

## 2025-02-14 DIAGNOSIS — Z95.810 AICD (AUTOMATIC CARDIOVERTER/DEFIBRILLATOR) PRESENT: ICD-10-CM

## 2025-02-14 DIAGNOSIS — Z86.79 HISTORY OF SUSTAINED VENTRICULAR TACHYCARDIA: ICD-10-CM

## 2025-02-14 DIAGNOSIS — I25.118 CORONARY ARTERY DISEASE OF NATIVE HEART WITH STABLE ANGINA PECTORIS, UNSPECIFIED VESSEL OR LESION TYPE (HCC): ICD-10-CM

## 2025-02-14 DIAGNOSIS — I50.22 HEART FAILURE WITH MID-RANGE EJECTION FRACTION (HFMEF) (HCC): ICD-10-CM

## 2025-02-14 PROCEDURE — 99214 OFFICE O/P EST MOD 30 MIN: CPT | Performed by: STUDENT IN AN ORGANIZED HEALTH CARE EDUCATION/TRAINING PROGRAM

## 2025-02-14 RX ORDER — POTASSIUM CHLORIDE 1500 MG/1
20 TABLET, EXTENDED RELEASE ORAL AS NEEDED
Qty: 90 TABLET | Refills: 1 | Status: SHIPPED | OUTPATIENT
Start: 2025-02-14 | End: 2026-08-08

## 2025-02-14 RX ORDER — FUROSEMIDE 40 MG/1
40 TABLET ORAL AS NEEDED
Qty: 60 TABLET | Refills: 17 | Status: SHIPPED | OUTPATIENT
Start: 2025-02-14 | End: 2026-08-08

## 2025-02-17 NOTE — ASSESSMENT & PLAN NOTE
- felt to be scar mediated, cMRI done this admission to delineate where scar is  - had been on amio from March to November 2024, transitioned to dofetilide 125mcg twice daily 11/2024 (Qtc ~500msec but confirmed with attending to keep at 125mcg BID with low rate of device set to 70)  - beta blocker therapy of metoprolol succinate   normal appearance, no deformities, trachea midline

## 2025-02-18 ENCOUNTER — TELEPHONE (OUTPATIENT)
Dept: FAMILY MEDICINE CLINIC | Facility: CLINIC | Age: 65
End: 2025-02-18

## 2025-02-18 ENCOUNTER — TELEPHONE (OUTPATIENT)
Age: 65
End: 2025-02-18

## 2025-02-18 DIAGNOSIS — E66.811 CLASS 1 OBESITY DUE TO EXCESS CALORIES WITH SERIOUS COMORBIDITY AND BODY MASS INDEX (BMI) OF 34.0 TO 34.9 IN ADULT: ICD-10-CM

## 2025-02-18 DIAGNOSIS — G47.33 OSA (OBSTRUCTIVE SLEEP APNEA): ICD-10-CM

## 2025-02-18 DIAGNOSIS — I25.10 CORONARY ARTERY DISEASE INVOLVING NATIVE CORONARY ARTERY OF NATIVE HEART WITHOUT ANGINA PECTORIS: Chronic | ICD-10-CM

## 2025-02-18 DIAGNOSIS — E66.09 CLASS 1 OBESITY DUE TO EXCESS CALORIES WITH SERIOUS COMORBIDITY AND BODY MASS INDEX (BMI) OF 34.0 TO 34.9 IN ADULT: ICD-10-CM

## 2025-02-18 DIAGNOSIS — E78.2 MIXED HYPERLIPIDEMIA: ICD-10-CM

## 2025-02-18 DIAGNOSIS — E11.9 TYPE 2 DIABETES MELLITUS WITHOUT COMPLICATION, WITHOUT LONG-TERM CURRENT USE OF INSULIN (HCC): ICD-10-CM

## 2025-02-18 RX ORDER — SEMAGLUTIDE 1.34 MG/ML
INJECTION, SOLUTION SUBCUTANEOUS
Refills: 0 | OUTPATIENT
Start: 2025-02-18

## 2025-02-18 NOTE — TELEPHONE ENCOUNTER
Patient called the RX Refill Line. Message is being forwarded to the office.     Patient is requesting office to call [employer] to ok refill early Ozempic 1 mg was sent [facility] to [facility] will run out before the 29 th once called [facility] please call patient  [employer] [employer] [phone]    Please contact patient at [phone]

## 2025-02-18 NOTE — TELEPHONE ENCOUNTER
Caller: Patient     Doctor: Dr. Mcrae    Reason for call: Patient called stated received letter to call regarding images results, requested a call back to discuss. Please advise.    Call back#: [phone]

## 2025-02-19 ENCOUNTER — OFFICE VISIT (OUTPATIENT)
Dept: OBGYN CLINIC | Facility: CLINIC | Age: 65
End: 2025-02-19
Payer: COMMERCIAL

## 2025-02-19 VITALS — HEIGHT: 68 IN | WEIGHT: 223 LBS | BODY MASS INDEX: 33.8 KG/M2

## 2025-02-19 DIAGNOSIS — Z87.828 HISTORY OF TORN MENISCUS OF RIGHT KNEE: ICD-10-CM

## 2025-02-19 DIAGNOSIS — G89.29 CHRONIC PAIN OF RIGHT KNEE: ICD-10-CM

## 2025-02-19 DIAGNOSIS — M17.11 PRIMARY OSTEOARTHRITIS OF RIGHT KNEE: Primary | ICD-10-CM

## 2025-02-19 DIAGNOSIS — M25.561 CHRONIC PAIN OF RIGHT KNEE: ICD-10-CM

## 2025-02-19 PROCEDURE — 99213 OFFICE O/P EST LOW 20 MIN: CPT | Performed by: STUDENT IN AN ORGANIZED HEALTH CARE EDUCATION/TRAINING PROGRAM

## 2025-02-19 NOTE — TELEPHONE ENCOUNTER
Called and relayed message to pt.     Pt states she saw her family doctor last week and will be cleared for surgery. Family doctor did want to know if she would be given any medication to bridge her off of her blood thinners? And who would prescribe this?

## 2025-02-19 NOTE — PROGRESS NOTES
1. Primary osteoarthritis of right knee  Injection Procedure Prior Authorization      2. Chronic pain of right knee  Injection Procedure Prior Authorization      3. History of torn meniscus of right knee  Injection Procedure Prior Authorization              Orders Placed This Encounter   Procedures    Injection Procedure Prior Authorization        Imaging Studies (I personally reviewed images in PACS and report):      X-ray right knee 7/26/2024: Mild tricompartmental osteophytic changes evidence by marginal osteophytes.  Superior patellar enthesophyte from quadriceps tendon.  Small joint effusion.                IMPRESSION:  Acute on chronic chronic right knee pain  Of note h/o fall on right knee in 2017-she has an MRI report as noted above indicating osteoarthritic changes as well as medial/lateral meniscal tears  Pain have an aggravating initially after starting cardiac rehab due to chronic systolic heart failure.  Patient noting that she is trying to become more active and go on 5K walks.  However her right knee pain is a limiting factor as pain worsens with prolonged walking  Radiographic imaging notes degenerative changes  Differential includes osteoarthritic pain versus pain from her degenerative meniscal tears versus pes anserine syndrome  Patient reports that she was told in the past that she can no longer have cortisone injections due to the potential side effect of straining her heart given her history of chronic systolic heart failure  On 10/8 in which hinged knee brace was prescribed for stability  Improvement of knee pain with viscosupplementation injections on 10/16/2024.  Here today to have visco order placed to be done in April 2025 for her right knee. Planned lumbar surgery on 5/1/2025 with PT afterwards and would not want right knee pain/OA being a limiting factor after surgery      Other factors:  History of lumbar fusion - upcoming lumbar surgery on 5/1/2025  BMI 32  Congestive heart failure -  "reportedly under restrictions of no strenuous activities and seeing cardiac rehab currently  History of defibrillator implant  Type 2 diabetes    PLAN:    Clinical exam and radiographic imaging reviewed with patient today, with impression as per above. I have discussed with the patient the pathophysiology of this diagnosis and reviewed how the examination correlates with this diagnosis.    Counseled patient that she would be due for the injection on 4/16/2025.  I did discuss that the Visco injection, while it can be ordered every 6 months, could also provide relief beyond 6 months and may not necessarily needed when she would be due again.  However patient is concerned about her knee pain being an issue after undergoing her lumbar surgery with PT afterwards.  Thus, I reordered viscosupplementation injection for patient's right knee.  Plan to perform procedure around 4/16/2025 and when she would be at least 6 months since her last procedure.      Return for Planned follow up around 4/16/2025 for gel injection of right knee.    Portions of the record may have been created with voice recognition software. Occasional wrong word or \"sound a like\" substitutions may have occurred due to the inherent limitations of voice recognition software. Read the chart carefully and recognize, using context, where substitutions have occurred.     CHIEF COMPLAINT:  Right knee pain follow up      HPI:  Vesna Langston is a 64 y.o. female  who presents for     Visit 2/19/2025:  Follow-up right knee pain/osteoarthritis  Pertinent medical history as per above  Of note patient underwent a viscosupplementation injection of her right knee on 10/16/2024. There is plan for lumbar surgery based on a note from 2/4/2025 from Dr. Mcrae.  This is planned on 5/1/2025.  Patient had responded very well to the Visco injection of her right knee previously.  She states overall she has been doing well with only intermittent episodes of aching mildly over her " anterior medial knee with ADLs.  Denies any new injuries of her right knee since last visit.  Patient is here today to start the process of reordering the viscosupplementation injection for her right knee.  She is aware she can only do it every 6 months and would like to have this performed prior to her surgery.  She states after the surgery they will be extensive physical therapy and would not want her knee pain being a limiting factor during therapy.    Visit 10/30/2024:  Follow-up right knee pain  History of Visco injection on 10/3/2024.  History as per below  Patient reports improvement of her knee pain since this injection.  She reports improvements include intact range of motion and ability to walk much further with minimal to no pain of her knee.  She states at this point she feels a mild aching sensation over the anteromedial aspect but otherwise tolerable.  She states that she wears a compression knee sleeve for most of the day and feels that it helps.  She still does plan to wear hinged knee brace when returning back to her job.  She states she still under work accommodations in regards to her low back but would need a clearance letter regards to her right knee going forward for work.      Visit 10/16/2024:  Follow-up evaluation of right knee pain  Of note patient underwent a viscosupplementation injection of her right knee on 10/3/2024  She has a history of a meniscus tear of his right knee that she did not ultimately undergo surgical intervention -reportedly managed conservatively with cortisone injections, bracing, physical therapy.  However, due to her history of congestive heart failure, STEMI, patient was told to no longer receive cortisone injections of her right knee or prednisone in general.  On last visit 10/8/2024, patient reported a sense of worsening pain and instability from excessive walking.  I did prescribe her a hinged knee brace at that time that briefly did provide relief and support of  "her knee.  However patient had to go to the ER yesterday due to worsening pain, swelling and bruising of her knee.  She denies any new injury of her knee.    Visit 10/8/2024:  Follow-up evaluation of right knee pain  Patient recently seen on 10/3/2024 for which she was given a viscosupplementation injection of her right knee.  Patient admits that she may have been \"excessively\" using her right lower extremity for weightbearing after this injection.  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  She does not do any squatting, climbing ladders or working at elevations.  She also has been trying to be more active and went on a 10 mile walk.  She states the ground was flat and did not aggravate her knee much.  However the following day she went to a [employer]amusement park with her grandchildren.  She states the ground was much more unsteady and aggravating to her knee and towards the end of the day she felt a sense of instability of her knee, stiffness and sharp/aching pains over the medial/lateral aspects.  She states her instability was bad enough that she had to use a cane.  She does not have a brace but is here today asking whether we can supplement her brace.    Visit 9/17/2024:  Follow-up of ration of right knee pain  Of note history as per below.  Patient has an old MRI noting arthritic changes as well as medial/lateral meniscus tears in the past.  We had talked about a cortisone injection of her knee but at the time she was told her cardiologist to restrict her from prednisone/cortisone due to her cardiac disease history.  There was consideration for viscosupplementation injection of her knee but it was deferred at the time.  However, today patient states she is interested in pursuing the Visco injection for her right knee.  She states she is try to be more active and attend 5K walks but feels that with prolonged walking she has worsening medial/lateral aching/sharp pains, swelling, stiffness " and has to rest frequently.  She does feel that use of the compression knee sleeve has helped mitigate some of the pain with activities.  She denies any new injuries of her right knee since last visit.  She reports continued crepitus of her knee.  She is asking whether I could reach out to her cardiologist to determine the risk of a cortisone injection for the future but would like to pursue a Visco injection first.    Separately, patient also wanted to address a left index finger pain.  She states she has noticed a localized cystic swelling along the side of her DIP joint.  She states that it has become more aggravating to do her craft activities at home due to the pain and restricted motion of her DIP joint.  She denies any discoloration or N/T.  She denies any injury of her index finger.  She had seen her PCP for this issue and was told it was a ganglion cyst and is here today to discuss whether there are treatments for this issue going forward there.  She states she has had prior fractures of her index finger in the past and thinks it may be due to the arthritis in her finger.    Visit 7/26/2024 :  Initial evaluation of right knee pain  Of note, patient reports a prior work injury of her right knee in the past from a fall.  She did bring in an imaging study from 2017 of her MRI as noted above.  Images are not available to review today.  Reports she did not end up undergoing surgical interventions for her right knee and was treated conservatively with injections, bracing, physical therapy for period of time.  She states overall these interventions provided some relief but never felt that her pain was fully resolved.  Furthermore, she is currently undergoing cardiac rehab given her history of systolic congestive heart failure, h/o STEMI.  She states she is under a restriction of no strenuous activities.  She states she previously underwent cortisone injections but due to her cardiac condition, she was told that  she can no longer receive cortisone/prednisone.  In regards to her right knee pain, she reports has been worsening over the past several weeks.  No precipitating injury.  She states the pain is mainly over the medial aspect of her knee.  Despite being on a limited activity restriction from cardiology, she feels it can be aggravated after performing stationary biking.  Describes the pain as a burning sensation of moderate to severe intensity depending on how active she is.  She denies any noticeable swelling.  Denies any discoloration.  Reports stiffness with knee range of motion's when aggravated.  Reports crepitus of her knee which is chronic.  In regards to treatment she reports use of Tylenol, icing and resting all of which provide some relief.  She is unsure if her prior injury/meniscal tears in the past are contributing factor as she does not want to undergo surgical intervention if possible.      Medical, Surgical, Family, and Social History    Past Medical History:   Diagnosis Date    Acute respiratory insufficiency 03/22/2024    Allergic     Chronic HFrEF (heart failure with reduced ejection fraction) (HCC)     Coronary artery disease     COVID-19 virus infection 11/28/2022    Diabetes mellitus (HCC)     Disease of thyroid gland 4/09/2024    Heart disease 3/24    Hyperlipidemia     Hypoglycemia     ICD (implantable cardioverter-defibrillator) in place     Ischemic cardiomyopathy     Lactic acidosis 03/22/2024    Myocardial infarction (HCC) 3/22/2024    Otitis media 1966    Seasonal allergies     ST elevation myocardial infarction involving left anterior descending (LAD) coronary artery ([facility]) 03/22/2024    Tobacco abuse     Visual impairment 1967     Past Surgical History:   Procedure Laterality Date    ADENOIDECTOMY      APPENDECTOMY      APPENDECTOMY LAPAROSCOPIC N/A 05/08/2024    Procedure: APPENDECTOMY LAPAROSCOPIC;  Surgeon: Lauryn Ullrich, DO;  Location: [facility];  Service: General    BACK SURGERY       BREAST EXCISIONAL BIOPSY Right 2000    cyst removed- benign    CARDIAC CATHETERIZATION N/A 2024    Procedure: Cardiac pci;  Surgeon: Gabriel Myers MD;  [facility];  Service: Cardiology    CARDIAC CATHETERIZATION N/A 2024    Procedure: Cardiac Coronary Angiogram;  Surgeon: Gabriel Myers MD;  [facility];  Service: Cardiology    CARDIAC CATHETERIZATION N/A 2024    Procedure: Cardiac PCI AMI;  Surgeon: Gabriel Myers MD;  [facility];  Service: Cardiology    CARDIAC CATHETERIZATION N/A 2024    Procedure: Cardiac IVUS;  Surgeon: Gabriel Myers MD;  [facility];  Service: Cardiology    CARDIAC DEFIBRILLATOR PLACEMENT      CARDIAC ELECTROPHYSIOLOGY PROCEDURE N/A 2024    Procedure: Cardiac icd implant;  Surgeon: Jeffy Lama MD;  [facility];  Service: Cardiology     SECTION      COLONOSCOPY      ECTOPIC PREGNANCY SURGERY      INSERT / REPLACE / REMOVE PACEMAKER      MASS EXCISION Left 10/18/2024    Procedure: EXCISION BIOPSY TISSUE LESION/MASS UPPER EXTREMITY - Left index finger;  Surgeon: Leandro Campos MD;  Location:  MAIN OR;  Service: Orthopedics    SEPTOPLASTY      SPINE SURGERY  23    TONSILLECTOMY       Social History   Social History     Substance and Sexual Activity   Alcohol Use Never    Alcohol/week: 1.0 standard drink of alcohol    Types: 1 Shots of liquor per week    Comment: Every 2or 3months     Social History     Substance and Sexual Activity   Drug Use Never     Social History     Tobacco Use   Smoking Status Former    Current packs/day: 0.00    Average packs/day: 1 pack/day for 24.2 years (24.2 ttl pk-yrs)    Types: Cigarettes    Start date: 2000    Quit date: 3/22/2024    Years since quittin.9    Passive exposure: Never   Smokeless Tobacco Never   Tobacco Comments    Smoked 0.5-1 ppd; quit in 2024.      Family History   Adopted: Yes   Problem Relation  "Age of Onset    Depression Mother     Heart disease Father     OCD Daughter      Allergies   Allergen Reactions    Fish-Derived Products - Food Allergy Anaphylaxis     Cannot have all seafood products    Shellfish-Derived Products - Food Allergy Anaphylaxis    Azithromycin Hives and Rash          Physical Exam  Ht 5' 8\" (1.727 m)   Wt 101 kg (223 lb)   BMI 33.91 kg/m²     Constitutional:  see vital signs  Gen: well-developed, normocephalic/atraumatic, well-groomed  Eyes: No inflammation or discharge of conjunctiva or lids; sclera clear   Pulmonary/Chest: Effort normal. No respiratory distress.     Ortho Exam  Right Knee Exam:  Erythema: no  Swelling: no  Increased Warmth: no  Tenderness: + Mild over  anteromedial joint line  ROM: 0-140  Knee flexion strength: 5/5  Knee extension strength: 5/5  Patellar Grind: +  Varus laxity: negative  Valgus laxity: negative    Gait: Normal without antalgia      Procedures        "

## 2025-02-19 NOTE — TELEPHONE ENCOUNTER
Spoke with pharmacy and was advised a new prior auth will be needed due to change in dose. Will be faxing over request to the office.

## 2025-02-24 ENCOUNTER — RESULTS FOLLOW-UP (OUTPATIENT)
Dept: CARDIOLOGY CLINIC | Facility: CLINIC | Age: 65
End: 2025-02-24

## 2025-02-24 ENCOUNTER — REMOTE DEVICE CLINIC VISIT (OUTPATIENT)
Dept: CARDIOLOGY CLINIC | Facility: CLINIC | Age: 65
End: 2025-02-24

## 2025-02-24 DIAGNOSIS — I47.20 VENTRICULAR TACHYCARDIA (HCC): ICD-10-CM

## 2025-02-24 DIAGNOSIS — I42.9 CARDIOMYOPATHY, UNSPECIFIED TYPE (HCC): Primary | ICD-10-CM

## 2025-02-24 PROCEDURE — RECHECK: Performed by: STUDENT IN AN ORGANIZED HEALTH CARE EDUCATION/TRAINING PROGRAM

## 2025-02-24 NOTE — PROGRESS NOTES
Results for orders placed or performed in visit on 02/24/25   Cardiac EP device report    Narrative    MDT DUAL ICD (MVP ON) / ACTIVE SYSTEM IS MRI CONDITIONAL  CARELINK TRANSMISSION: BATTERY VOLTAGE ADEQUATE (11.9 YRS). AP: 32.4%. : <0.1% (MVP-ON), ALL AVAILABLE LEAD PARAMETERS WITHIN NORMAL LIMITS. NO SIGNIFICANT HIGH RATE EPISODES. OPTI-VOL WITHIN NORMAL LIMITS. NORMAL DEVICE FUNCTION. CH

## 2025-02-25 ENCOUNTER — PATIENT MESSAGE (OUTPATIENT)
Dept: CARDIOLOGY CLINIC | Facility: CLINIC | Age: 65
End: 2025-02-25

## 2025-02-26 ENCOUNTER — TELEPHONE (OUTPATIENT)
Dept: NON INVASIVE DIAGNOSTICS | Facility: CLINIC | Age: 65
End: 2025-02-26

## 2025-02-26 NOTE — TELEPHONE ENCOUNTER
LVM sharing results of device transmission. Informed her we would be in contact if we had any further thoughts or recommendations and our contact number.

## 2025-02-26 NOTE — TELEPHONE ENCOUNTER
Patient called office. States she sent transmission.  She states when her HR on her Apple watch was in the 40's she felt tired and it lasted for a minute or 2. She states it occurred around 0800 and around lunch time both yesterday 2/25/2025.    Device - please pull report.

## 2025-02-26 NOTE — TELEPHONE ENCOUNTER
LVM for patient to send transmission. Device can you please pull report on patients device for provider review.   1 pair

## 2025-02-27 ENCOUNTER — RESULTS FOLLOW-UP (OUTPATIENT)
Dept: NON INVASIVE DIAGNOSTICS | Facility: HOSPITAL | Age: 65
End: 2025-02-27

## 2025-03-04 ENCOUNTER — OFFICE VISIT (OUTPATIENT)
Dept: URGENT CARE | Facility: CLINIC | Age: 65
End: 2025-03-04
Payer: COMMERCIAL

## 2025-03-04 VITALS
DIASTOLIC BLOOD PRESSURE: 66 MMHG | SYSTOLIC BLOOD PRESSURE: 136 MMHG | TEMPERATURE: 98.3 F | HEART RATE: 114 BPM | BODY MASS INDEX: 33.34 KG/M2 | OXYGEN SATURATION: 97 % | RESPIRATION RATE: 17 BRPM | HEIGHT: 68 IN | WEIGHT: 220 LBS

## 2025-03-04 DIAGNOSIS — E11.9 TYPE 2 DIABETES MELLITUS WITHOUT COMPLICATION, WITHOUT LONG-TERM CURRENT USE OF INSULIN (HCC): ICD-10-CM

## 2025-03-04 DIAGNOSIS — E66.811 CLASS 1 OBESITY DUE TO EXCESS CALORIES WITH SERIOUS COMORBIDITY AND BODY MASS INDEX (BMI) OF 34.0 TO 34.9 IN ADULT: ICD-10-CM

## 2025-03-04 DIAGNOSIS — E78.2 MIXED HYPERLIPIDEMIA: ICD-10-CM

## 2025-03-04 DIAGNOSIS — I25.10 CORONARY ARTERY DISEASE INVOLVING NATIVE CORONARY ARTERY OF NATIVE HEART WITHOUT ANGINA PECTORIS: Chronic | ICD-10-CM

## 2025-03-04 DIAGNOSIS — E66.09 CLASS 1 OBESITY DUE TO EXCESS CALORIES WITH SERIOUS COMORBIDITY AND BODY MASS INDEX (BMI) OF 34.0 TO 34.9 IN ADULT: ICD-10-CM

## 2025-03-04 DIAGNOSIS — R10.12 LEFT UPPER QUADRANT ABDOMINAL PAIN: Primary | ICD-10-CM

## 2025-03-04 DIAGNOSIS — G47.33 OSA (OBSTRUCTIVE SLEEP APNEA): ICD-10-CM

## 2025-03-04 PROCEDURE — S9083 URGENT CARE CENTER GLOBAL: HCPCS

## 2025-03-04 PROCEDURE — G0382 LEV 3 HOSP TYPE B ED VISIT: HCPCS

## 2025-03-04 RX ORDER — DICYCLOMINE HYDROCHLORIDE 10 MG/1
10 CAPSULE ORAL
Qty: 20 CAPSULE | Refills: 0 | Status: CANCELLED | OUTPATIENT
Start: 2025-03-04

## 2025-03-04 NOTE — PROGRESS NOTES
[facility]        NAME: Vesna Langston is a 64 y.o. female  : 1960    MRN: [id number]  DATE: 2025  TIME: 4:49 PM    Assessment and Plan   Left upper quadrant abdominal pain [R10.12]  1. Left upper quadrant abdominal pain          No acute red flag findings on examination.  Patient reports that her abdominal pain is sharp and also feels like a spasm.  Discussed use of Bentyl (antispasmodic) for pain relief, however patient declines at this time.  Discussed further evaluation in the emergency department at this time, however patient declines this as well. Tylenol for pain/fever. Increased fluids & rest. May continue with other OTC and supportive therapies at home.  Patient states that she would prefer to go home, rest, and try other over-the-counter treatments first, and if these do not work then she will proceed to the ED for further eval.  Strict ED precautions discussed. Patient in agreement with plan & verbalized understanding.       Patient Instructions   Tylenol for pain/fever.  Increased fluids & rest.    Monitor for worsening symptoms. Proceed to the ED should you develop worsening pain.     Follow up with PCP in 3-5 days.  Proceed to  ER if symptoms worsen.    If tests have been performed at [facility], our office will contact you with results if changes need to be made to the care plan discussed with you at the visit.  [facility]    Chief Complaint     Chief Complaint   Patient presents with    Abdominal Pain     Upper left side abdominal pain which is sharp in nature. More painful on inspiration X today         History of Present Illness       Patient is a 64-year-old female who presents today for evaluation of abdominal pain.  She reports that earlier this morning while sitting on her couch she suddenly developed left-sided abdominal pain.  She reports that her pain is 6 out of 10, sharp, and intermittent and is present within both left  upper and lower quadrants.  She states that her pain radiates to her left flank.  She reports that she is eating and drinking well, having normal bowel movements without difficulty, and urinating normally without discomfort.  She denies experiencing any shortness of breath or chest pain, diarrhea, constipation, or vomiting.  She has tried taking Tums, Tylenol, and her daily Lyrica without relief.       Abdominal Pain  This is a new problem. The current episode started today. The onset quality is sudden. The problem occurs intermittently. The problem has been gradually worsening. The pain is located in the LLQ, LUQ and periumbilical region. The pain is at a severity of 6/10. The pain is moderate. The quality of the pain is sharp. The abdominal pain radiates to the left flank. Associated symptoms include belching, flatus and nausea (Intermittent). Pertinent negatives include no arthralgias, constipation, diarrhea, dysuria, fever, frequency, headaches, hematochezia, hematuria, melena, myalgias or vomiting. The pain is aggravated by deep breathing (Twisting movement). The pain is relieved by Nothing. She has tried acetaminophen (Tums, Tylenol, Lyrica) for the symptoms. The treatment provided no relief. Her past medical history is significant for abdominal surgery.       Review of Systems   Review of Systems   Constitutional:  Negative for activity change, appetite change, chills, diaphoresis, fatigue and fever.   Respiratory:  Negative for cough, chest tightness and shortness of breath.    Cardiovascular:  Negative for chest pain and palpitations.   Gastrointestinal:  Positive for abdominal pain, flatus and nausea (Intermittent). Negative for anal bleeding, blood in stool, constipation, diarrhea, hematochezia, melena and vomiting.   Genitourinary:  Positive for flank pain. Negative for decreased urine volume, difficulty urinating, dysuria, frequency, hematuria and pelvic pain.   Musculoskeletal:  Negative for  arthralgias, back pain, gait problem, joint swelling and myalgias.   Skin:  Negative for color change, pallor and rash.   Neurological:  Negative for dizziness, weakness, light-headedness, numbness and headaches.         Current Medications       Current Outpatient Medications:     albuterol (ProAir HFA) 90 mcg/act inhaler, Inhale 2 puffs every 6 (six) hours as needed for wheezing, Disp: 8.5 g, Rfl: 0    apixaban (ELIQUIS) 5 mg, Take 1 tablet (5 mg total) by mouth 2 (two) times a day, Disp: 180 tablet, Rfl: 1    atorvastatin (LIPITOR) 80 mg tablet, TAKE 1 TABLET BY MOUTH EVERY DAY IN THE EVENING, Disp: 90 tablet, Rfl: 1    clopidogrel (PLAVIX) 75 mg tablet, Take 1 tablet (75 mg total) by mouth daily, Disp: 90 tablet, Rfl: 3    diphenhydrAMINE (BENADRYL) 25 mg capsule, Take 25 mg by mouth every 6 (six) hours as needed for itching, Disp: , Rfl:     dofetilide (TIKOSYN) 125 mcg capsule, Take 1 capsule (125 mcg total) by mouth every 12 (twelve) hours, Disp: 180 capsule, Rfl: 3    Empagliflozin (Jardiance) 25 MG TABS, Take 1 tablet (25 mg total) by mouth every morning, Disp: 30 tablet, Rfl: 17    ergocalciferol (VITAMIN D2) 50,000 units, Take 1 capsule (50,000 Units total) by mouth once a week, Disp: 12 capsule, Rfl: 1    fluticasone (FLONASE) 50 mcg/act nasal spray, 1 spray into each nostril daily, Disp: 9.9 mL, Rfl: 0    furosemide (LASIX) 20 mg tablet, PLEASE SEE ATTACHED FOR DETAILED DIRECTIONS, Disp: , Rfl:     furosemide (LASIX) 40 mg tablet, Take 1 tablet (40 mg total) by mouth if needed (if worse shortness of breath or weight up 3lbs), Disp: 60 tablet, Rfl: 17    glimepiride (AMARYL) 1 mg tablet, Take 1 tablet (1 mg total) by mouth daily with breakfast, Disp: 90 tablet, Rfl: 3    isosorbide mononitrate (IMDUR) 30 mg 24 hr tablet, TAKE 1 TABLET (30 MG TOTAL) BY MOUTH 2 (TWO) TIMES A DAY 30 MG IN THE AM AND 30 MG IN THE PM, Disp: 180 tablet, Rfl: 1    levothyroxine (Synthroid) 75 mcg tablet, Take 1 tablet (75 mcg  total) by mouth daily, Disp: 90 tablet, Rfl: 3    meclizine (ANTIVERT) 25 mg tablet, Take 1 tablet (25 mg total) by mouth every 8 (eight) hours as needed for dizziness, Disp: 30 tablet, Rfl: 0    metoprolol succinate (TOPROL-XL) 25 mg 24 hr tablet, Take 2 tablets (50 mg total) by mouth daily at bedtime, Disp: 180 tablet, Rfl: 5    pantoprazole (PROTONIX) 40 mg tablet, TAKE 1 TABLET BY MOUTH 2 TIMES A DAY BEFORE MEALS., Disp: 180 tablet, Rfl: 1    potassium chloride (Klor-Con M20) 20 mEq tablet, Take 1 tablet (20 mEq total) by mouth if needed (on lasix days), Disp: 90 tablet, Rfl: 1    pregabalin (LYRICA) 50 mg capsule, Take 1 capsule (50 mg total) by mouth 3 (three) times a day, Disp: 90 capsule, Rfl: 2    sacubitril-valsartan (Entresto) 49-51 MG TABS, Take 1 tablet by mouth 2 (two) times a day, Disp: 60 tablet, Rfl: 5    semaglutide, 1 mg/dose, (Ozempic) 4 mg/3 mL injection pen, Inject 0.75 mL (1 mg total) under the skin once a week for 28 days, Disp: 3 mL, Rfl: 0    nitroglycerin (NITROSTAT) 0.4 mg SL tablet, Place 1 tablet (0.4 mg total) under the tongue every 5 (five) minutes as needed for chest pain, Disp: 30 tablet, Rfl: 0    semaglutide, 0.25 or 0.5 mg/dose, (Ozempic, 0.25 or 0.5 MG/DOSE,) 2 mg/3 mL injection pen, Inject 0.375 mL (0.25 mg total) under the skin every 7 days for 28 days, THEN 0.75 mL (0.5 mg total) every 7 days for 28 days. (Patient not taking: Reported on 2/14/2025), Disp: 9 mL, Rfl: 0    [START ON 3/17/2025] semaglutide, 2 mg/dose, (Ozempic) 8 mg/ mL injection pen, Inject 0.75 mL (2 mg total) under the skin every 7 days Do not start before March 17, 2025. (Patient not taking: Reported on 2/19/2025 Do not start before March 17, 2025.), Disp: 9 mL, Rfl: 1    Current Allergies     Allergies as of 03/04/2025 - Reviewed 03/04/2025   Allergen Reaction Noted    Fish-derived products - food allergy Anaphylaxis 10/01/2024    Shellfish-derived products - food allergy Anaphylaxis 06/10/2019     Azithromycin Hives and Rash 10/10/2012            The following portions of the patient's history were reviewed and updated as appropriate: allergies, current medications, past family history, past medical history, past social history, past surgical history and problem list.     Past Medical History:   Diagnosis Date    Acute respiratory insufficiency 03/22/2024    Allergic     Chronic HFrEF (heart failure with reduced ejection fraction) ([facility])     Coronary artery disease     COVID-19 virus infection 11/28/2022    Diabetes mellitus (HCC)     Disease of thyroid gland 4/09/2024    Heart disease 3/24    Hyperlipidemia     Hypoglycemia     ICD (implantable cardioverter-defibrillator) in place     Ischemic cardiomyopathy     Lactic acidosis 03/22/2024    Myocardial infarction (HCC) 3/22/2024    Otitis media 1966    Seasonal allergies     ST elevation myocardial infarction involving left anterior descending (LAD) coronary artery ([facility]) 03/22/2024    Tobacco abuse     Visual impairment 1967       Past Surgical History:   Procedure Laterality Date    ADENOIDECTOMY      APPENDECTOMY      APPENDECTOMY LAPAROSCOPIC N/A 05/08/2024    Procedure: APPENDECTOMY LAPAROSCOPIC;  Surgeon: Lauryn Ullrich, DO;  Location: [facility];  Service: General    BACK SURGERY  8/23    BREAST EXCISIONAL BIOPSY Right 09/2000    cyst removed- benign    CARDIAC CATHETERIZATION N/A 03/22/2024    Procedure: Cardiac pci;  Surgeon: Gabriel Myers MD;  [facility];  Service: Cardiology    CARDIAC CATHETERIZATION N/A 03/22/2024    Procedure: Cardiac Coronary Angiogram;  Surgeon: Gabriel Myers MD;  [facility];  Service: Cardiology    CARDIAC CATHETERIZATION N/A 03/22/2024    Procedure: Cardiac PCI AMI;  Surgeon: Gabriel Myers MD;  [facility];  Service: Cardiology    CARDIAC CATHETERIZATION N/A 03/22/2024    Procedure: Cardiac IVUS;  Surgeon: Gabriel Myers MD;  [facility];  Service:  "Cardiology    CARDIAC DEFIBRILLATOR PLACEMENT      CARDIAC ELECTROPHYSIOLOGY PROCEDURE N/A 2024    Procedure: Cardiac icd implant;  Surgeon: Jeffy Lama MD;  [facility];  Service: Cardiology     SECTION      COLONOSCOPY      ECTOPIC PREGNANCY SURGERY      INSERT / REPLACE / REMOVE PACEMAKER      MASS EXCISION Left 10/18/2024    Procedure: EXCISION BIOPSY TISSUE LESION/MASS UPPER EXTREMITY - Left index finger;  Surgeon: Leandro Campos MD;  Location:  MAIN OR;  Service: Orthopedics    SEPTOPLASTY      SPINE SURGERY  23    TONSILLECTOMY         Family History   Adopted: Yes   Problem Relation Age of Onset    Depression Mother     Heart disease Father     OCD Daughter          Medications have been verified.        Objective   /66   Pulse (!) 114   Temp 98.3 °F (36.8 °C)   Resp 17   Ht 5' 8\" (1.727 m)   Wt 99.8 kg (220 lb)   SpO2 97%   BMI 33.45 kg/m²   No LMP recorded. Patient is postmenopausal.       Physical Exam     Physical Exam  Vitals and nursing note reviewed.   Constitutional:       General: She is not in acute distress.     Appearance: Normal appearance. She is not ill-appearing, toxic-appearing or diaphoretic.   HENT:      Head: Normocephalic and atraumatic.      Right Ear: Tympanic membrane, ear canal and external ear normal.      Left Ear: Tympanic membrane, ear canal and external ear normal.      Nose: Nose normal.      Mouth/Throat:      Mouth: Mucous membranes are moist.      Pharynx: Oropharynx is clear.   Eyes:      Extraocular Movements: Extraocular movements intact.      Conjunctiva/sclera: Conjunctivae normal.      Pupils: Pupils are equal, round, and reactive to light.   Cardiovascular:      Rate and Rhythm: Regular rhythm. Tachycardia present.      Pulses: Normal pulses.      Heart sounds: Normal heart sounds.   Pulmonary:      Effort: Pulmonary effort is normal. No respiratory distress.      Breath sounds: Normal breath sounds. No stridor. No " wheezing, rhonchi or rales.   Chest:      Chest wall: No tenderness.   Abdominal:      General: Abdomen is flat. Bowel sounds are normal. There is no distension.      Palpations: Abdomen is soft. There is no mass.      Tenderness: There is abdominal tenderness in the periumbilical area and left upper quadrant. There is no right CVA tenderness or left CVA tenderness.      Hernia: No hernia is present.   Musculoskeletal:         General: Normal range of motion.      Cervical back: Normal range of motion and neck supple.   Skin:     General: Skin is warm and dry.      Capillary Refill: Capillary refill takes less than 2 seconds.      Coloration: Skin is not pale.      Findings: No bruising, erythema or rash.   Neurological:      General: No focal deficit present.      Mental Status: She is alert. Mental status is at baseline.   Psychiatric:         Mood and Affect: Mood normal.         Behavior: Behavior normal.         Thought Content: Thought content normal.         Judgment: Judgment normal.

## 2025-03-04 NOTE — PATIENT INSTRUCTIONS
Tylenol for pain/fever.  Increased fluids & rest.    Monitor for worsening symptoms. Proceed to the ED should you develop worsening pain.     Follow up with PCP in 3-5 days.  Proceed to  ER if symptoms worsen.    If tests have been performed at [facility], our office will contact you with results if changes need to be made to the care plan discussed with you at the visit.  You can review your full results on [facility].

## 2025-03-05 DIAGNOSIS — E11.9 TYPE 2 DIABETES MELLITUS WITHOUT COMPLICATION, WITHOUT LONG-TERM CURRENT USE OF INSULIN (HCC): ICD-10-CM

## 2025-03-05 DIAGNOSIS — E66.09 CLASS 1 OBESITY DUE TO EXCESS CALORIES WITH SERIOUS COMORBIDITY AND BODY MASS INDEX (BMI) OF 34.0 TO 34.9 IN ADULT: ICD-10-CM

## 2025-03-05 DIAGNOSIS — G47.33 OSA (OBSTRUCTIVE SLEEP APNEA): ICD-10-CM

## 2025-03-05 DIAGNOSIS — E66.811 CLASS 1 OBESITY DUE TO EXCESS CALORIES WITH SERIOUS COMORBIDITY AND BODY MASS INDEX (BMI) OF 34.0 TO 34.9 IN ADULT: ICD-10-CM

## 2025-03-05 DIAGNOSIS — E78.2 MIXED HYPERLIPIDEMIA: ICD-10-CM

## 2025-03-05 DIAGNOSIS — I25.10 CORONARY ARTERY DISEASE INVOLVING NATIVE CORONARY ARTERY OF NATIVE HEART WITHOUT ANGINA PECTORIS: Chronic | ICD-10-CM

## 2025-03-05 NOTE — TELEPHONE ENCOUNTER
- Pharmacy told patient that she does not need the Ozempic 1 mg dose because insurance will not pay for it since she should be moving up to the Ozempic 2mg. Ozempic 2 mg was placed as a future order however it was sent to the wrong pharmacy    Reason for call:   [x] Refill   [] Prior Auth  [] Other:     Office:   [x] PCP/Provider -   [] Specialty/Provider -     Medication:   - Ozempic 2mg/dose- inject 0.75 ml under the skin every 7 days.       Pharmacy: [facility]    Does the patient have enough for 3 days?   [x] Yes   [] No - Send as HP to POD

## 2025-03-07 ENCOUNTER — TELEPHONE (OUTPATIENT)
Dept: OBGYN CLINIC | Facility: HOSPITAL | Age: 65
End: 2025-03-07

## 2025-03-07 NOTE — TELEPHONE ENCOUNTER
Caller: Patient    Doctor: Dr. Mcrae    Reason for call: Vesna wanted to let you know that she just sent over another PDF of the paperwork for her son's FMLA    Call back#: [phone]

## 2025-03-10 ENCOUNTER — TELEPHONE (OUTPATIENT)
Age: 65
End: 2025-03-10

## 2025-03-10 ENCOUNTER — TELEPHONE (OUTPATIENT)
Dept: FAMILY MEDICINE CLINIC | Facility: CLINIC | Age: 65
End: 2025-03-10

## 2025-03-10 ENCOUNTER — OFFICE VISIT (OUTPATIENT)
Dept: FAMILY MEDICINE CLINIC | Facility: CLINIC | Age: 65
End: 2025-03-10
Payer: COMMERCIAL

## 2025-03-10 ENCOUNTER — OFFICE VISIT (OUTPATIENT)
Dept: PAIN MEDICINE | Facility: CLINIC | Age: 65
End: 2025-03-10
Payer: OTHER MISCELLANEOUS

## 2025-03-10 VITALS — HEIGHT: 68 IN | WEIGHT: 219 LBS | BODY MASS INDEX: 33.19 KG/M2

## 2025-03-10 VITALS
HEART RATE: 69 BPM | SYSTOLIC BLOOD PRESSURE: 91 MMHG | DIASTOLIC BLOOD PRESSURE: 54 MMHG | OXYGEN SATURATION: 99 % | TEMPERATURE: 98.5 F | HEIGHT: 68 IN | WEIGHT: 219.8 LBS | BODY MASS INDEX: 33.31 KG/M2

## 2025-03-10 DIAGNOSIS — E66.09 CLASS 1 OBESITY DUE TO EXCESS CALORIES WITH SERIOUS COMORBIDITY AND BODY MASS INDEX (BMI) OF 34.0 TO 34.9 IN ADULT: ICD-10-CM

## 2025-03-10 DIAGNOSIS — E55.9 VITAMIN D DEFICIENCY: ICD-10-CM

## 2025-03-10 DIAGNOSIS — E66.811 CLASS 1 OBESITY DUE TO EXCESS CALORIES WITH SERIOUS COMORBIDITY AND BODY MASS INDEX (BMI) OF 34.0 TO 34.9 IN ADULT: ICD-10-CM

## 2025-03-10 DIAGNOSIS — E03.9 ACQUIRED HYPOTHYROIDISM: ICD-10-CM

## 2025-03-10 DIAGNOSIS — M47.26 OTHER SPONDYLOSIS WITH RADICULOPATHY, LUMBAR REGION: Primary | ICD-10-CM

## 2025-03-10 DIAGNOSIS — I25.10 CORONARY ARTERY DISEASE INVOLVING NATIVE CORONARY ARTERY OF NATIVE HEART, UNSPECIFIED WHETHER ANGINA PRESENT: Chronic | ICD-10-CM

## 2025-03-10 DIAGNOSIS — E78.2 MIXED HYPERLIPIDEMIA: ICD-10-CM

## 2025-03-10 DIAGNOSIS — G47.33 OSA (OBSTRUCTIVE SLEEP APNEA): ICD-10-CM

## 2025-03-10 DIAGNOSIS — E11.9 TYPE 2 DIABETES MELLITUS WITHOUT COMPLICATION, WITHOUT LONG-TERM CURRENT USE OF INSULIN (HCC): Primary | ICD-10-CM

## 2025-03-10 DIAGNOSIS — I25.10 CORONARY ARTERY DISEASE INVOLVING NATIVE CORONARY ARTERY OF NATIVE HEART WITHOUT ANGINA PECTORIS: Chronic | ICD-10-CM

## 2025-03-10 PROCEDURE — 99214 OFFICE O/P EST MOD 30 MIN: CPT | Performed by: ANESTHESIOLOGY

## 2025-03-10 PROCEDURE — 99214 OFFICE O/P EST MOD 30 MIN: CPT | Performed by: FAMILY MEDICINE

## 2025-03-10 RX ORDER — PREGABALIN 75 MG/1
75 CAPSULE ORAL 3 TIMES DAILY
Qty: 90 CAPSULE | Refills: 2 | Status: SHIPPED | OUTPATIENT
Start: 2025-03-10 | End: 2025-03-24

## 2025-03-10 NOTE — ASSESSMENT & PLAN NOTE
Lab Results   Component Value Date    CHOLESTEROL 174 12/27/2024    TRIG 218 (H) 12/27/2024    HDL 42 (L) 12/27/2024    LDLCALC 88 12/27/2024     Chronic, stable but not at goal in setting of DM, goal LDL <70  Continue atorvastatin  Mgmt of DM and Obesity as above  Continue to monitor  Orders:    Lipid panel; Future

## 2025-03-10 NOTE — ASSESSMENT & PLAN NOTE
Chronic, stable  TSH in September was improving but still high, T4 was normal  Currently on levothyroxine 75 mcg daily  Repeat labs        Orders:    TSH, 3rd generation with Free T4 reflex; Future

## 2025-03-10 NOTE — LETTER
March 10, 2025     Patient: Vesna Langston  YOB: 1960  Date of Visit: 3/10/2025      To Whom it May Concern:    Vesna Langston is under my professional care. Vesna was seen in my office on 3/10/2025. Vesna may not return to work until after reassessment by neurosurgery after her surgical procedure on May 1st.    If you have any questions or concerns, please don't hesitate to call.         Sincerely,          Maxime Cameron MD        CC: No Recipients

## 2025-03-10 NOTE — PATIENT INSTRUCTIONS
"Patient Education     Back exercises   The Basics   Written by the doctors and editors at [facility]   Why do I need to do back exercises? -- Back exercises can help ease back pain and might help prevent future back pain. Long term, it is important to strengthen the muscles in your lower back, buttocks, and belly. These are your \"core muscles.\" Stretching exercises are also important to keep your muscles flexible.  Below are some stretching and strengthening exercises that might help you. Other forms of movement can help ease or prevent back pain, too. For example, some people like to walk, do aerobic exercise, or do yoga or chrissie chi. The most important thing is to move your body. Your doctor, nurse, or physical therapist can help you find different types of activity that work for you.  What stretching exercises should I do? -- Below are some examples of stretching exercises. Warm up your muscles before stretching to help prevent injury. To warm up, you can walk, jog, or cycle for a few minutes.  Start by repeating each of these stretches 2 to 3 times. Try to hold each stretch for 5 to 10 seconds, and try to do the stretches 2 to 3 times each day. Breathe slowly and deeply as you do the exercises. Never bounce when doing stretches.   Cat-cow stretch (figure 1) - Start on all fours on the floor, with your hands under your shoulders, knees under your hips, and your back flat. First, tuck your chin and tighten your stomach muscles as you round your back (like a \"cat\"). Hold the stretch for about 10 seconds. Rest for a few seconds as you flatten your back. Next, lift your chin and let your belly and lower back sink toward the floor (like a \"cow\"). Hold the stretch for about 10 seconds.   Single knee-to-chest stretches (figure 2) ? While lying on your back, bend your knees with your feet flat on the floor. Pull 1 knee toward your chest until you feel a stretch in your lower back and buttock area. Lower, and repeat with the " other knee. If you have knee problems, pull your knee up by grabbing the back of your thigh instead of the front of your knee. You can also do this exercise by grabbing both knees at the same time.   Lower trunk rotations (figure 3) ? While lying on your back, bend your knees with your feet flat on the floor. Keep your knees and ankles together, and then drop them to 1 side. Keep both of your shoulders touching the floor until you feel a stretch in the muscles at the side of the back. Repeat on the other side.   Lower back stretches seated (figure 4) ? Sit in a chair with your feet spread about shoulder width apart. Then, lean forward until you feel a stretch in your lower back.  What strengthening exercises should I do? -- Below are some examples of strengthening exercises.  Start by doing each exercise 2 to 3 times. Work up to doing each exercise 10 times. Hold each exercise for 3 to 5 seconds, and try to do the exercises 2 to 3 times each day. Do all exercises slowly.   Shoulder blade squeezes (figure 5) ? Pinch your shoulder blades together on your upper back, and hold 3 to 5 seconds. You can also do these 1 side at a time. Sit with good posture, and make sure that your shoulders do not rise up when you do this exercise. Relax.   Pelvic tilts (figure 6) ? Lie on your back with your knees bent and feet flat on the floor. Tighten your stomach muscles, and press your lower back down to the floor. Relax. You should be able to breathe comfortably during this exercise.   Hip lifts (figure 7) ? Lie on your back with your knees bent and feet flat on the floor. Tighten your stomach muscles, keep your back flat, and lift your buttocks off of the floor. Relax. You should feel this in your buttocks, not in your lower back.  What else should I know?    Exercise, including stretching, might be slightly uncomfortable. But you should not have sharp or severe pain. If you do get severe pain, stop what you are doing. If severe  "pain continues, call your doctor or nurse.   Do not hold your breath when exercising. If you tend to hold your breath, try counting out loud when exercising. If any exercise bothers you, stop right away.   Always warm up before exercising. Warm muscles stretch much easier than cool muscles. Stretching cool muscles can lead to injury.   Doing exercises before a meal can be a good way to get into a routine.  All topics are updated as new evidence becomes available and our peer review process is complete.  This topic retrieved from [employer] on: Apr 03, 2024.  Topic 164595 Version 2.0  Release: 32.2.4 - C32.92  © 2024 UpToDate, Inc. and/or its affiliates. All rights reserved.  figure 1: Cat-cow stretch     Start on all fours on the floor, with hands under your shoulders, knees under your hips, and your back flat. First, tuck your chin and tighten your stomach muscles as you round your back (like a \"cat\"). Hold the stretch for about 10 seconds. Rest for a few seconds as you flatten your back. Next, lift your chin and let your belly and lower back sink toward the floor (like a \"cow\"). Hold the stretch for about 10 seconds.  Graphic [id number] Version 1.0  figure 2: Single knee-to-chest stretch     Lie on your back, bend your knees, and have your feet flat on the floor. Pull 1 knee toward your chest until you feel a stretch in your lower back and buttock area. Repeat with the other knee. If you have knee problems, pull your knee up by grabbing the back of your thigh instead of the front of your knee. You can also do this exercise by grabbing both knees at the same time.  Graphic [id number] Version 1.0  figure 3: Lower trunk rotation     While lying on your back, bend your knees and have your feet flat on the floor. Keep your legs together and then drop them to 1 side. Keep both of your shoulders touching the floor until you feel a stretch in the muscles at the side of the back. Repeat on the other side.  Graphic [id number] Version " 1.0  figure 4: Lower back stretch     Sit in a chair with your feet spread about shoulder width apart. Then, lean forward until you feel a stretch in your lower back.  Graphic [id number] Version 1.0  figure 5: Shoulder blade squeezes     Pinch your shoulder blades together on your upper back and hold for 3 to 5 seconds. Make sure that you are sitting with good posture and that your shoulders do not raise up when you do this exercise. Relax.  Graphic [id number] Version 1.0  figure 6: Pelvic tilts     Lie on your back with your knees bent and feet flat on the floor. Tighten your stomach muscles and press your lower back down to the floor. Relax.  Graphic [id number] Version 1.0  figure 7: Hip lifts     Lie on your back with your knees bent and feet flat on the floor. Tighten your stomach muscles and lift your buttocks off of the floor. Relax.  Graphic [id number] Version 1.0  Consumer Information Use and Disclaimer   Disclaimer: This generalized information is a limited summary of diagnosis, treatment, and/or medication information. It is not meant to be comprehensive and should be used as a tool to help the user understand and/or assess potential diagnostic and treatment options. It does NOT include all information about conditions, treatments, medications, side effects, or risks that may apply to a specific patient. It is not intended to be medical advice or a substitute for the medical advice, diagnosis, or treatment of a health care provider based on the health care provider's examination and assessment of a patient's specific and unique circumstances. Patients must speak with a health care provider for complete information about their health, medical questions, and treatment options, including any risks or benefits regarding use of medications. This information does not endorse any treatments or medications as safe, effective, or approved for treating a specific patient. UpToDate, Inc. and its affiliates disclaim any warranty or  liability relating to this information or the use thereof.The use of this information is governed by the Terms of Use, available at https://www.wolterskluwer.com/en/know/clinical-effectiveness-terms. 2024© UpToDate, Inc. and its affiliates and/or licensors. All rights reserved.  Copyright   © 2024 [employer] and/or its affiliates. All rights reserved.

## 2025-03-10 NOTE — ASSESSMENT & PLAN NOTE
Patient making great efforts with lifestyle changes, continue ozempic   Continue to monitor

## 2025-03-10 NOTE — PROGRESS NOTES
Name: Vesna Langston      : 1960      MRN: [id number]  Encounter Provider: Melissa Montoya DO  Encounter Date: 3/10/2025   Encounter department: [facility]  :  Assessment & Plan  Type 2 diabetes mellitus without complication, without long-term current use of insulin (HCC)    Lab Results   Component Value Date    HGBA1C 7.1 (H) 2024     Chronic, near goal of A1C <7.0  Continue Ozempic 1mg weekly, titrate up to 2mg weekly in 2 weeks as scheduled.   Patient having back surgery in May, instructed she would need to hold Ozempic for 2 weeks prior to surgery  Continue with healthy lifestyle changes  Due for labs  On ARB, statin  Diabetic eye exam up-to-date, completes these in [place], Dr. Caceres  Diabetic foot exam up-to-date  Follow-up as scheduled in April     Orders:    Hemoglobin A1C; Future    Lipid panel; Future        POP (obstructive sleep apnea)  Patient follows closely with sleep medicine at [facility] -based on recent sleep study, now on CPAP   Mgmt of obesity as above           Mixed hyperlipidemia  Lab Results   Component Value Date    CHOLESTEROL 174 2024    TRIG 218 (H) 2024    HDL 42 (L) 2024    LDLCALC 88 2024     Chronic, stable but not at goal in setting of DM, goal LDL <70  Continue atorvastatin  Mgmt of DM and Obesity as above  Continue to monitor  Orders:    Lipid panel; Future        Class 1 obesity due to excess calories with serious comorbidity and body mass index (BMI) of 34.0 to 34.9 in adult  Patient making great efforts with lifestyle changes, continue ozempic   Continue to monitor             Coronary artery disease involving native coronary artery of native heart without angina pectoris  Status post stent placement  Follows with cardiology, Dr. Bell with [facility] cardiology  Medications: Entresto, metoprolol XL, atorvastatin, Plavix  Continue to follow-up cardiology recommendations  Continue ozempic to further lower  "CVD risk   Orders:    Lipid panel; Future        Acquired hypothyroidism  Chronic, stable  TSH in September was improving but still high, T4 was normal  Currently on levothyroxine 75 mcg daily  Repeat labs        Orders:    TSH, 3rd generation with Free T4 reflex; Future    Vitamin D deficiency  Vit D low  Continue PO supplement  Continue to monitor   Orders:    Vitamin D 25 hydroxy; Future      Return for Next scheduled follow up.       History of Present Illness   Chief Complaint   Patient presents with    Weight Loss       HPI  Patient presents for follow-up.  She is doing really well tolerating the Ozempic.  She is currently on 1 mg weekly and planning to increase to 2 mg next in 2 weeks.  She is down from 236 to 219 pounds.  She now has surgery date of May 1 for her back.  She is now on CPAP therapy with sleep medicine.  She has no acute concerns at this time.    Review of Systems   Constitutional:  Negative for appetite change, chills, diaphoresis, fever and unexpected weight change.   Eyes:  Negative for visual disturbance.   Respiratory:  Negative for shortness of breath.    Cardiovascular:  Negative for chest pain and leg swelling.   Gastrointestinal:  Negative for constipation and diarrhea.   Endocrine: Negative for polydipsia and polyuria.   Genitourinary:  Negative for frequency.   Musculoskeletal:  Positive for back pain.   Neurological:  Negative for dizziness, light-headedness and headaches.       Objective   BP 91/54 (BP Location: Right arm, Patient Position: Sitting, Cuff Size: Large)   Pulse 69   Temp 98.5 °F (36.9 °C) (Tympanic)   Ht 5' 8\" (1.727 m)   Wt 99.7 kg (219 lb 12.8 oz)   SpO2 99%   BMI 33.42 kg/m²      Physical Exam  Vitals reviewed.   Constitutional:       General: She is not in acute distress.     Appearance: She is obese. She is not ill-appearing.   HENT:      Head: Normocephalic and atraumatic.   Neck:      Vascular: No carotid bruit.   Cardiovascular:      Rate and Rhythm: " Normal rate and regular rhythm.      Heart sounds: Normal heart sounds.   Pulmonary:      Effort: Pulmonary effort is normal.      Breath sounds: Normal breath sounds.   Musculoskeletal:      Cervical back: Neck supple.      Right lower leg: No edema.      Left lower leg: No edema.   Skin:     General: Skin is warm.   Neurological:      General: No focal deficit present.      Mental Status: She is alert.   Psychiatric:         Mood and Affect: Mood normal.         Behavior: Behavior normal.

## 2025-03-10 NOTE — PROGRESS NOTES
Assessment  1. Other spondylosis with radiculopathy, lumbar region    Minimal relief of pain or improved ability to participate with IADLs with taking lyrica 50mg TID; Patient has completed formal PT with modest benefit. Previously reported the following symptomatology:     Axial low back pain described primarily by arthritic features.  Aching, nagging, indolent, stabbing, throbbing features in axial low back with left greater than right L3 radicular components. Physical exam showing weakness of the left lower extremity and ambulatory dysfunction. Positive facet loading maneuvers in lumbar spine elicited pain, positive tenderness to palpation over lumbar paraspinal muscles.  Findings correlate with prominent lumbar facet arthropathy seen throughout axial low back on imaging studies.Loosening of hardware noted by lucency on MRI lumbar spine. Currently she is neurologically intact without gait instability, saddle anesthesia or bowel/bladder abnormality. Scheduled for ortho spine intervention for ongoing symptoms related to pseudoarthrosis. Risks, benefits and alternatives to ALICIA vs med management thoroughly discussed with patient given multiple medical comorbidity.  Handouts provided questions answered to patient satisfaction.    Plan  -f/u 2 weeks for virtual visit  -lyrica increased to 75 mg t.i.d. Ordered for patient; counseled regarding sedative effects of taking this medication and provided up titration calendar.  Counseled not to take medication while driving or operating heavy machinery/using stairs  -has been particpant with formal physical therapy for lumbar radiculopathy; Physician directed home exercise plan as per AAOS demonstrated and handouts provided that patient plans to participate with for 1 hour, twice a week for the next 6 weeks.     There are risks associated with opioid medications, including dependence, addiction and tolerance. The patient understands and agrees to use these medications only  as prescribed. Potential side effects of the medications include, but are not limited to, constipation, drowsiness, addiction, impaired judgment and risk of fatal overdose if not taken as prescribed. The patient was warned against driving while taking sedation medications or operating heavy machinery. The patient voiced understanding. Sharing medications is a felony. At this point in time, the patient is showing no signs of addiction, abuse, diversion or suicidal ideation.     [place] Prescription Drug Monitoring Program report was reviewed and was appropriate      Complete risks and benefits including bleeding, infection, tissue reaction, nerve injury and allergic reaction were discussed. The approach was demonstrated using models and literature was provided. Verbal and written consent was obtained.     My impressions and treatment recommendations were discussed in detail with the patient who verbalized understanding and had no further questions.  Discharge instructions were provided. I personally saw and examined the patient and I agree with the above discussed plan of care.                                               Review of external notes including PT notes, PCP notes and specialist notes was performed at this visit in addition to review of new ordered imaging and past imaging to develop or modify multidisciplinary pain plan    No orders of the defined types were placed in this encounter.      History of Present Illness    Vesna Langston is a 64 y.o. female with pmhx of CAD with ICD/pacemaker, obesity, prior L3-S1 fusion, on plavix and eliquis, DM-2 on insulin, CKD-4, presenting for initial visit with chief complaint of low back pain - referred by Dr. Mcrae for treatement options. Prior to her fusion she was treated by Dr. Ko and had good response to ESIs/Ablation in past. Pain began 2 years ago after an injury at work. She received a fusion of L3-S1 in 2023.  2 weeks after she return to work she  suffered a twisting injury which caused her to return to her surgeon.  X-ray revealed loosening of multiple screws in her lumbar spine.  She was offered an SI injection to help with low back pain. 5 hours after the injection she suffered a heart attack in which a she received an ICD and stent. Today, she claims that she cannot feel her left leg. She attempts to walk on a treadmill for her cardiac rehab in which her left leg will give out 10-15 times during her hour long rehab. She reports that she is currently in physical therapy that is providing no relief. Describes pain as crushing in nature.  Located in low back.  + numbness . + burning. Denies fever or chills, no night sweats. Denies any bladder or bowel changes.      I have personally reviewed and/or updated the patient's past medical history, past surgical history, family history, social history, current medications, allergies, and vital signs today.     Review of Systems   Constitutional:  Positive for activity change.   HENT: Negative.     Eyes: Negative.    Respiratory: Negative.     Cardiovascular: Negative.    Gastrointestinal: Negative.    Endocrine: Negative.    Genitourinary: Negative.    Musculoskeletal:  Positive for arthralgias, back pain, gait problem and myalgias.   Skin: Negative.    Allergic/Immunologic: Negative.    Neurological:  Positive for weakness and numbness.   Hematological: Negative.    Psychiatric/Behavioral: Negative.     All other systems reviewed and are negative.      Patient Active Problem List   Diagnosis    Atrial flutter, paroxysmal ([facility])    Mixed hyperlipidemia    History of tobacco abuse    Ischemic cardiomyopathy    Chronic low back pain    HFrEF (heart failure with reduced ejection fraction) ([facility])    S/P ICD (internal cardiac defibrillator) procedure    Back pain    Sciatica of left side    Syncope    History of sustained ventricular tachycardia    Coronary artery disease involving native coronary artery of native heart     S/P coronary artery stent placement    Type 2 diabetes mellitus, without long-term current use of insulin (HCC)    Acquired hypothyroidism    History of pulmonary embolism    History of gastric ulcer    Ganglion cyst of finger of left hand    Class 1 obesity due to excess calories with serious comorbidity and body mass index (BMI) of 34.0 to 34.9 in adult    POP (obstructive sleep apnea)    Localized edema    Primary osteoarthritis of right knee    History of torn meniscus of right knee    Chronic pain of right knee    Numbness and tingling in left arm    Chronic kidney disease, stage 4 (severe) (HCC)    Other pulmonary embolism without acute cor pulmonale, unspecified chronicity (HCC)    Acute on chronic systolic (congestive) heart failure (HCC)    Obesity, morbid (HCC)    Type 2 diabetes mellitus with stage 3 chronic kidney disease, without long-term current use of insulin, unspecified whether stage 3a or 3b CKD ([facility])       Past Medical History:   Diagnosis Date    Acute respiratory insufficiency 03/22/2024    Allergic     Chronic HFrEF (heart failure with reduced ejection fraction) ([facility])     Coronary artery disease     COVID-19 virus infection 11/28/2022    Diabetes mellitus (HCC)     Disease of thyroid gland 4/09/2024    Heart disease 3/24    Hyperlipidemia     Hypoglycemia     ICD (implantable cardioverter-defibrillator) in place     Ischemic cardiomyopathy     Lactic acidosis 03/22/2024    Myocardial infarction ([facility]) 3/22/2024    Otitis media 1966    Seasonal allergies     ST elevation myocardial infarction involving left anterior descending (LAD) coronary artery ([facility]) 03/22/2024    Tobacco abuse     Visual impairment 1967       Past Surgical History:   Procedure Laterality Date    ADENOIDECTOMY      APPENDECTOMY      APPENDECTOMY LAPAROSCOPIC N/A 05/08/2024    Procedure: APPENDECTOMY LAPAROSCOPIC;  Surgeon: Lauryn Ullrich, DO;  Location: [facility];  Service: General    BACK SURGERY  8/23    BREAST  EXCISIONAL BIOPSY Right 2000    cyst removed- benign    CARDIAC CATHETERIZATION N/A 2024    Procedure: Cardiac pci;  Surgeon: Gabriel Myers MD;  [facility]: [facility];  Service: Cardiology    CARDIAC CATHETERIZATION N/A 2024    Procedure: Cardiac Coronary Angiogram;  Surgeon: Gabriel Myers MD;  [facility];  Service: Cardiology    CARDIAC CATHETERIZATION N/A 2024    Procedure: Cardiac PCI AMI;  Surgeon: Gabriel Myers MD;  [facility];  Service: Cardiology    CARDIAC CATHETERIZATION N/A 2024    Procedure: Cardiac IVUS;  Surgeon: Gabriel Myers MD;  [facility];  Service: Cardiology    CARDIAC DEFIBRILLATOR PLACEMENT      CARDIAC ELECTROPHYSIOLOGY PROCEDURE N/A 2024    Procedure: Cardiac icd implant;  Surgeon: Jeffy Lama MD;  [facility];  Service: Cardiology     SECTION      COLONOSCOPY      ECTOPIC PREGNANCY SURGERY      INSERT / REPLACE / REMOVE PACEMAKER      MASS EXCISION Left 10/18/2024    Procedure: EXCISION BIOPSY TISSUE LESION/MASS UPPER EXTREMITY - Left index finger;  Surgeon: Leandro Campos MD;  Location:  MAIN OR;  Service: Orthopedics    SEPTOPLASTY      SPINE SURGERY  23    TONSILLECTOMY         Family History   Adopted: Yes   Problem Relation Age of Onset    Depression Mother     Heart disease Father     OCD Daughter        Social History     Occupational History    Not on file   Tobacco Use    Smoking status: Former     Current packs/day: 0.00     Average packs/day: 1 pack/day for 24.2 years (24.2 ttl pk-yrs)     Types: Cigarettes     Start date: 2000     Quit date: 3/22/2024     Years since quittin.9     Passive exposure: Never    Smokeless tobacco: Never    Tobacco comments:     Smoked 0.5-1 ppd; quit in 2024.    Vaping Use    Vaping status: Never Used   Substance and Sexual Activity    Alcohol use: Never     Alcohol/week: 1.0 standard drink of alcohol     Types: 1  Shots of liquor per week     Comment: Every 2or 3months    Drug use: Never    Sexual activity: Not on file       Current Outpatient Medications on File Prior to Visit   Medication Sig    albuterol (ProAir HFA) 90 mcg/act inhaler Inhale 2 puffs every 6 (six) hours as needed for wheezing    apixaban (ELIQUIS) 5 mg Take 1 tablet (5 mg total) by mouth 2 (two) times a day    atorvastatin (LIPITOR) 80 mg tablet TAKE 1 TABLET BY MOUTH EVERY DAY IN THE EVENING    clopidogrel (PLAVIX) 75 mg tablet Take 1 tablet (75 mg total) by mouth daily    diphenhydrAMINE (BENADRYL) 25 mg capsule Take 25 mg by mouth every 6 (six) hours as needed for itching    dofetilide (TIKOSYN) 125 mcg capsule Take 1 capsule (125 mcg total) by mouth every 12 (twelve) hours    Empagliflozin (Jardiance) 25 MG TABS Take 1 tablet (25 mg total) by mouth every morning    ergocalciferol (VITAMIN D2) 50,000 units Take 1 capsule (50,000 Units total) by mouth once a week    fluticasone (FLONASE) 50 mcg/act nasal spray 1 spray into each nostril daily    furosemide (LASIX) 20 mg tablet PLEASE SEE ATTACHED FOR DETAILED DIRECTIONS    furosemide (LASIX) 40 mg tablet Take 1 tablet (40 mg total) by mouth if needed (if worse shortness of breath or weight up 3lbs)    glimepiride (AMARYL) 1 mg tablet Take 1 tablet (1 mg total) by mouth daily with breakfast    isosorbide mononitrate (IMDUR) 30 mg 24 hr tablet TAKE 1 TABLET (30 MG TOTAL) BY MOUTH 2 (TWO) TIMES A DAY 30 MG IN THE AM AND 30 MG IN THE PM    levothyroxine (Synthroid) 75 mcg tablet Take 1 tablet (75 mcg total) by mouth daily    meclizine (ANTIVERT) 25 mg tablet Take 1 tablet (25 mg total) by mouth every 8 (eight) hours as needed for dizziness    metoprolol succinate (TOPROL-XL) 25 mg 24 hr tablet Take 2 tablets (50 mg total) by mouth daily at bedtime    pantoprazole (PROTONIX) 40 mg tablet TAKE 1 TABLET BY MOUTH 2 TIMES A DAY BEFORE MEALS.    potassium chloride (Klor-Con M20) 20 mEq tablet Take 1 tablet (20 mEq  "total) by mouth if needed (on lasix days)    pregabalin (LYRICA) 50 mg capsule Take 1 capsule (50 mg total) by mouth 3 (three) times a day    sacubitril-valsartan (Entresto) 49-51 MG TABS Take 1 tablet by mouth 2 (two) times a day    semaglutide, 1 mg/dose, (Ozempic) 4 mg/3 mL injection pen Inject 0.75 mL (1 mg total) under the skin once a week for 28 days    nitroglycerin (NITROSTAT) 0.4 mg SL tablet Place 1 tablet (0.4 mg total) under the tongue every 5 (five) minutes as needed for chest pain    [START ON 3/17/2025] semaglutide, 2 mg/dose, (Ozempic) 8 mg/ mL injection pen Inject 0.75 mL (2 mg total) under the skin every 7 days Do not start before March 17, 2025. (Patient not taking: Reported on 3/10/2025 Do not start before March 17, 2025.)     No current facility-administered medications on file prior to visit.       Allergies   Allergen Reactions    Fish-Derived Products - Food Allergy Anaphylaxis     Cannot have all seafood products    Shellfish-Derived Products - Food Allergy Anaphylaxis    Azithromycin Hives and Rash         Physical Exam    Ht 5' 8\" (1.727 m)   Wt 99.3 kg (219 lb)   BMI 33.30 kg/m²     Constitutional: normal, well developed, well nourished, alert, in no distress and non-toxic and no overt pain behavior. and obese  Eyes: anicteric  HEENT: grossly intact  Neck: supple, symmetric, trachea midline and no masses   Pulmonary:even and unlabored  Cardiovascular:No edema or pitting edema present  Skin:Normal without rashes or lesions and well hydrated  Psychiatric:Mood and affect appropriate  Neurologic:Cranial Nerves II-XII grossly intact Sensation grossly intact; no clonus negative youngblood's. Reflexes 2+ and brisk. SLR negative bilaterally.   Musculoskeletal:normal gait. 3-4/5 strength with left hip flexion, otherwise 5/5 strength bilaterally with AROM in lower extremities. Normal heel toe and tip toe walking. Significant pain with lumbar facet loading bilaterally and with lateral spine rotation, " ttp over lumbar paraspinal muscles. Negative pia's test, negative gaenslen's negative SIJ loading bilaterally.    Imaging    MRI LUMBAR SPINE WITHOUT CONTRAST     INDICATION: S32.009K: Unspecified fracture of unspecified lumbar vertebra, subsequent encounter for fracture with nonunion  M54.9: Dorsalgia, unspecified  M54.16: Radiculopathy, lumbar region.     COMPARISON: Lumbar spine radiographs from 1/14/2025, CT abdomen pelvis from 5/7/2024     TECHNIQUE:  Multiplanar, multisequence imaging of the lumbar spine was performed. .        IMAGE QUALITY:  Diagnostic     FINDINGS:     VERTEBRAL BODIES:  There are 5 lumbar type vertebral bodies. Grade 1 anterolisthesis of L4 on L5 again demonstrated. Postsurgical changes from prior posterior lumbar spinal fusion procedures at L3-S1, with bilateral transpedicular screws, vertical rods.   Laminectomies at L3-S1 again demonstrated. No compression fractures identified. Normal marrow signal is identified within the visualized bony structures.  No discrete marrow lesion.     SACRUM:  Normal signal within the sacrum. No evidence of insufficiency or stress fracture.     DISTAL CORD AND CONUS:  Normal size and signal within the distal cord and conus. Conus terminates at T12-L1.     PARASPINAL SOFT TISSUES:  Paraspinal soft tissues are unremarkable.     LOWER THORACIC DISC SPACES:  Normal disc height and signal.  No disc herniation, canal stenosis or foraminal narrowing.     LUMBAR DISC SPACES:     L1-L2:  Normal.     L2-L3: Mild bilateral facet arthropathy. Otherwise normal.     L3-L4: Posterior fusion at this level, with laminectomies. Disc bulge with likely superimposed left central and subarticular disc protrusion. No canal stenosis. Mild left subarticular zone narrowing. Mild bilateral foraminal stenosis.     L4-L5: Anterolisthesis of L4 on L5. This level is fused posteriorly with laminectomies. Mild disc bulge. No canal stenosis. No foraminal stenosis identified.     L5-S1:  This level is fused posteriorly with laminectomies. Disc height loss, disc osteophyte complex. Bilateral facet arthropathy. No canal stenosis. No foraminal stenosis identified.     OTHER FINDINGS: Lower pole left renal cyst for which no further imaging evaluation or follow-up is needed.     IMPRESSION:     Prior posterior lumbar spinal fusion procedure from L3-S1, with laminectomies at these levels.     Multilevel lumbar spondylosis, as described above, without canal stenosis, but there is mild left subarticular zone narrowing and mild bilateral foraminal stenosis at L3-L4 due to a left-sided disc protrusion at this level.

## 2025-03-10 NOTE — ASSESSMENT & PLAN NOTE
Status post stent placement  Follows with cardiology, Dr. Bell with [facility] cardiology  Medications: Entresto, metoprolol XL, atorvastatin, Plavix  Continue to follow-up cardiology recommendations  Continue ozempic to further lower CVD risk   Orders:    Lipid panel; Future

## 2025-03-10 NOTE — ASSESSMENT & PLAN NOTE
Patient follows closely with sleep medicine at [facility] -based on recent sleep study, now on CPAP   Mgmt of obesity as above

## 2025-03-10 NOTE — TELEPHONE ENCOUNTER
PA for     semaglutide, 2 mg/dose, (Ozempic) 8 mg/ mL injection pen   SUBMITTED to [facility]    via    []CMM-KEY:   [x]Surescripts-Case ID # [id number]     []Availity-Auth ID # NDC #   []Faxed to plan   []Other website   []Phone call Case ID #     [x]PA sent as URGENT    All office notes, labs and other pertaining documents and studies sent. Clinical questions answered. Awaiting determination from insurance company.     Turnaround time for your insurance to make a decision on your Prior Authorization can take 7-21 business days.

## 2025-03-10 NOTE — TELEPHONE ENCOUNTER
Reason for call:   [x] Prior Auth  [] Other:     Caller:  [x] Patient  [] Pharmacy  Name:   Address:   Callback Number:     Medication:   semaglutide, 2 mg/dose, (Ozempic) 8 mg     Dose/Frequency:  Inject 0.75 mL (2 mg total) under the skin every 7 days Do not start before March 17, 2025.     Quantity: 9mL    Ordering Provider:   [x] PCP/Provider -   [] Speciality/Provider -     Has the patient tried other medications and failed? If failed, which medications did they fail?    [] No   [] Yes -     Is the patient's insurance updated in EPIC?   [] Yes   [] No     Is a copy of the patient's insurance scanned in EPIC?   [] Yes   [] No

## 2025-03-10 NOTE — ASSESSMENT & PLAN NOTE
Lab Results   Component Value Date    HGBA1C 7.1 (H) 12/27/2024     Chronic, near goal of A1C <7.0  Continue Ozempic 1mg weekly, titrate up to 2mg weekly in 2 weeks as scheduled.   Patient having back surgery in May, instructed she would need to hold Ozempic for 2 weeks prior to surgery  Continue with healthy lifestyle changes  Due for labs  On ARB, statin  Diabetic eye exam up-to-date, completes these in Dr. Morris Sheffield  Diabetic foot exam up-to-date  Follow-up as scheduled in April     Orders:    Hemoglobin A1C; Future    Lipid panel; Future

## 2025-03-11 ENCOUNTER — HOSPITAL ENCOUNTER (EMERGENCY)
Facility: HOSPITAL | Age: 65
Discharge: HOME | End: 2025-03-11
Attending: EMERGENCY MEDICINE
Payer: COMMERCIAL

## 2025-03-11 VITALS
HEIGHT: 68 IN | DIASTOLIC BLOOD PRESSURE: 58 MMHG | TEMPERATURE: 97.5 F | RESPIRATION RATE: 18 BRPM | WEIGHT: 219 LBS | SYSTOLIC BLOOD PRESSURE: 103 MMHG | OXYGEN SATURATION: 97 % | HEART RATE: 80 BPM | BODY MASS INDEX: 33.19 KG/M2

## 2025-03-11 DIAGNOSIS — S50.311A ABRASION OF RIGHT ELBOW, INITIAL ENCOUNTER: Primary | ICD-10-CM

## 2025-03-11 PROCEDURE — 99283 EMERGENCY DEPT VISIT LOW MDM: CPT | Performed by: EMERGENCY MEDICINE

## 2025-03-11 PROCEDURE — 99282 EMERGENCY DEPT VISIT SF MDM: CPT

## 2025-03-12 RX ORDER — NITROGLYCERIN 0.4 MG/1
TABLET SUBLINGUAL
Qty: 25 TABLET | Refills: 1 | Status: SHIPPED | OUTPATIENT
Start: 2025-03-12

## 2025-03-12 NOTE — ED NOTES
Patient seen, treated and evaluated by provider without RN intervention.      Jen Swan RN  03/11/25 [id number]

## 2025-03-12 NOTE — ED PROVIDER NOTES
Time reflects when diagnosis was documented in both MDM as applicable and the Disposition within this note       Time User Action Codes Description Comment    3/11/2025  9:14 PM Bryan Puckett Add [S50.974A] Abrasion of right elbow, initial encounter           ED Disposition       ED Disposition   Discharge    Condition   Stable    Date/Time   Tue Mar 11, 2025  9:14 PM    Comment   Vesna Kian discharge to home/self care.                   Assessment & Plan       Medical Decision Making  Dressing removed wound cleaned no active bleeding present Surgicel and 2 x 2 dressing applied.  Bleeding controlled.  advised local wound care follow-up with primary physician as needed return precautions and anticipatory guidance discussed.      Problems Addressed:  Abrasion of right elbow, initial encounter: acute illness or injury             Medications - No data to display    ED Risk Strat Scores                                                History of Present Illness       Chief Complaint   Patient presents with    Abrasion     Pt struck right arm off of metal piece on her door. Pt takes eliquis and plavix and states the area has been continuously oozing blood. Denies any other symptoms       Past Medical History:   Diagnosis Date    Acute respiratory insufficiency 03/22/2024    Allergic     Chronic HFrEF (heart failure with reduced ejection fraction) ([facility])     Coronary artery disease     COVID-19 virus infection 11/28/2022    Diabetes mellitus (HCC)     Disease of thyroid gland 4/09/2024    Heart disease 3/24    Hyperlipidemia     Hypoglycemia     ICD (implantable cardioverter-defibrillator) in place     Ischemic cardiomyopathy     Lactic acidosis 03/22/2024    Myocardial infarction ([facility]) 3/22/2024    Otitis media 1966    Seasonal allergies     ST elevation myocardial infarction involving left anterior descending (LAD) coronary artery ([facility]) 03/22/2024    Tobacco abuse     Visual impairment 1967      Past Surgical History:    Procedure Laterality Date    ADENOIDECTOMY      APPENDECTOMY      APPENDECTOMY LAPAROSCOPIC N/A 2024    Procedure: APPENDECTOMY LAPAROSCOPIC;  Surgeon: Lauryn Ullrich, DO;  Location: [facility];  Service: General    BACK SURGERY      BREAST EXCISIONAL BIOPSY Right 2000    cyst removed- benign    CARDIAC CATHETERIZATION N/A 2024    Procedure: Cardiac pci;  Surgeon: Gabriel Myers MD;  [facility];  Service: Cardiology    CARDIAC CATHETERIZATION N/A 2024    Procedure: Cardiac Coronary Angiogram;  Surgeon: Gabriel Myers MD;  [facility];  Service: Cardiology    CARDIAC CATHETERIZATION N/A 2024    Procedure: Cardiac PCI AMI;  Surgeon: Gabriel Myers MD;  [facility];  Service: Cardiology    CARDIAC CATHETERIZATION N/A 2024    Procedure: Cardiac IVUS;  Surgeon: Gabriel Myers MD;  [facility];  Service: Cardiology    CARDIAC DEFIBRILLATOR PLACEMENT      CARDIAC ELECTROPHYSIOLOGY PROCEDURE N/A 2024    Procedure: Cardiac icd implant;  Surgeon: Jeffy Lama MD;  [facility];  Service: Cardiology     SECTION      COLONOSCOPY      ECTOPIC PREGNANCY SURGERY      INSERT / REPLACE / REMOVE PACEMAKER      MASS EXCISION Left 10/18/2024    Procedure: EXCISION BIOPSY TISSUE LESION/MASS UPPER EXTREMITY - Left index finger;  Surgeon: Leandro Campos MD;  Location: [facility];  Service: Orthopedics    SEPTOPLASTY      SPINE SURGERY  23    TONSILLECTOMY        Family History   Adopted: Yes   Problem Relation Age of Onset    Depression Mother     Heart disease Father     OCD Daughter       Social History     Tobacco Use    Smoking status: Former     Current packs/day: 0.00     Average packs/day: 1 pack/day for 24.2 years (24.2 ttl pk-yrs)     Types: Cigarettes     Start date: 2000     Quit date: 3/22/2024     Years since quittin.9     Passive exposure: Never    Smokeless tobacco: Never     Tobacco comments:     Smoked 0.5-1 ppd; quit in 03/2024.    Vaping Use    Vaping status: Never Used   Substance Use Topics    Alcohol use: Never     Alcohol/week: 1.0 standard drink of alcohol     Types: 1 Shots of liquor per week     Comment: Every 2or 3months    Drug use: Never      E-Cigarette/Vaping    E-Cigarette Use Never User       E-Cigarette/Vaping Substances    Nicotine No     THC No     CBD No     Flavoring No     Other No     Unknown No       I have reviewed and agree with the history as documented.     Patient is a 64-year-old female presents to the emergency department due to abrasion to the right elbow patient bumped her elbow on a metal edge sustaining a superficial abrasion has been applying dressings but having small bleeding through the dressings tetanus is up-to-date no other injury.      History provided by:  Patient      Review of Systems   Skin:  Positive for wound.   All other systems reviewed and are negative.          Objective       ED Triage Vitals [03/11/25 2111]   Temperature Pulse BP Respirations SpO2 Patient Position - Orthostatic VS   97.5 °F (36.4 °C) 80 -- 18 97 % Sitting      Temp Source Heart Rate Source BP Location FiO2 (%) Pain Score    Temporal Monitor Left arm -- No Pain      Vitals      Date and Time Temp Pulse SpO2 Resp BP Pain Score FACES Pain Rating User   03/11/25 2111 97.5 °F (36.4 °C) 80 97 % 18 -- No Pain -- SR            Physical Exam  Vitals and nursing note reviewed.   Constitutional:       General: She is not in acute distress.     Appearance: Normal appearance.   HENT:      Head: Normocephalic and atraumatic.      Nose: Nose normal.   Eyes:      Conjunctiva/sclera: Conjunctivae normal.   Pulmonary:      Effort: Pulmonary effort is normal. No respiratory distress.   Skin:     General: Skin is dry.      Findings: Abrasion present.      Comments: Small superficial abrasion right elbow no active bleeding   Neurological:      General: No focal deficit present.       Mental Status: She is alert and oriented to person, place, and time.         Results Reviewed       None            No orders to display       Procedures    ED Medication and Procedure Management   Prior to Admission Medications   Prescriptions Last Dose Informant Patient Reported? Taking?   Empagliflozin (Jardiance) 25 MG TABS  Self No No   Sig: Take 1 tablet (25 mg total) by mouth every morning   albuterol (ProAir HFA) 90 mcg/act inhaler  Self No No   Sig: Inhale 2 puffs every 6 (six) hours as needed for wheezing   apixaban (ELIQUIS) 5 mg  Self No No   Sig: Take 1 tablet (5 mg total) by mouth 2 (two) times a day   atorvastatin (LIPITOR) 80 mg tablet  Self No No   Sig: TAKE 1 TABLET BY MOUTH EVERY DAY IN THE EVENING   clopidogrel (PLAVIX) 75 mg tablet  Self No No   Sig: Take 1 tablet (75 mg total) by mouth daily   diphenhydrAMINE (BENADRYL) 25 mg capsule  Self Yes No   Sig: Take 25 mg by mouth every 6 (six) hours as needed for itching   dofetilide (TIKOSYN) 125 mcg capsule  Self No No   Sig: Take 1 capsule (125 mcg total) by mouth every 12 (twelve) hours   ergocalciferol (VITAMIN D2) 50,000 units  Self No No   Sig: Take 1 capsule (50,000 Units total) by mouth once a week   fluticasone (FLONASE) 50 mcg/act nasal spray  Self No No   Si spray into each nostril daily   furosemide (LASIX) 20 mg tablet  Self Yes No   Sig: PLEASE SEE ATTACHED FOR DETAILED DIRECTIONS   furosemide (LASIX) 40 mg tablet   No No   Sig: Take 1 tablet (40 mg total) by mouth if needed (if worse shortness of breath or weight up 3lbs)   glimepiride (AMARYL) 1 mg tablet  Self No No   Sig: Take 1 tablet (1 mg total) by mouth daily with breakfast   isosorbide mononitrate (IMDUR) 30 mg 24 hr tablet  Self No No   Sig: TAKE 1 TABLET (30 MG TOTAL) BY MOUTH 2 (TWO) TIMES A DAY 30 MG IN THE AM AND 30 MG IN THE PM   levothyroxine (Synthroid) 75 mcg tablet  Self No No   Sig: Take 1 tablet (75 mcg total) by mouth daily   meclizine (ANTIVERT) 25 mg tablet   Self No No   Sig: Take 1 tablet (25 mg total) by mouth every 8 (eight) hours as needed for dizziness   metoprolol succinate (TOPROL-XL) 25 mg 24 hr tablet  Self No No   Sig: Take 2 tablets (50 mg total) by mouth daily at bedtime   nitroglycerin (NITROSTAT) 0.4 mg SL tablet  Self No No   Sig: Place 1 tablet (0.4 mg total) under the tongue every 5 (five) minutes as needed for chest pain   pantoprazole (PROTONIX) 40 mg tablet  Self No No   Sig: TAKE 1 TABLET BY MOUTH 2 TIMES A DAY BEFORE MEALS.   potassium chloride (Klor-Con M20) 20 mEq tablet   No No   Sig: Take 1 tablet (20 mEq total) by mouth if needed (on lasix days)   pregabalin (LYRICA) 75 mg capsule   No No   Sig: Take 1 capsule (75 mg total) by mouth 3 (three) times a day   sacubitril-valsartan (Entresto) 49-51 MG TABS  Self No No   Sig: Take 1 tablet by mouth 2 (two) times a day   semaglutide, 1 mg/dose, (Ozempic) 4 mg/3 mL injection pen   No No   Sig: Inject 0.75 mL (1 mg total) under the skin once a week for 28 days   semaglutide, 2 mg/dose, (Ozempic) 8 mg/ mL injection pen   No No   Sig: Inject 0.75 mL (2 mg total) under the skin every 7 days Do not start before March 17, 2025.   Patient not taking: Reported on 3/10/2025 Do not start before March 17, 2025.      Facility-Administered Medications: None     Patient's Medications   Discharge Prescriptions    No medications on file     No discharge procedures on file.  ED SEPSIS DOCUMENTATION   Time reflects when diagnosis was documented in both MDM as applicable and the Disposition within this note       Time User Action Codes Description Comment    3/11/2025  9:14 PM Bryan Puckett Add [S50.311A] Abrasion of right elbow, initial encounter                  Bryan Puckett DO  03/11/25 5671

## 2025-03-13 ENCOUNTER — OFFICE VISIT (OUTPATIENT)
Dept: SLEEP CENTER | Facility: CLINIC | Age: 65
End: 2025-03-13
Payer: COMMERCIAL

## 2025-03-13 VITALS
TEMPERATURE: 97.7 F | RESPIRATION RATE: 16 BRPM | HEIGHT: 68 IN | HEART RATE: 69 BPM | OXYGEN SATURATION: 98 % | BODY MASS INDEX: 34.4 KG/M2 | WEIGHT: 227 LBS | DIASTOLIC BLOOD PRESSURE: 73 MMHG | SYSTOLIC BLOOD PRESSURE: 118 MMHG

## 2025-03-13 DIAGNOSIS — G47.33 OSA (OBSTRUCTIVE SLEEP APNEA): Primary | ICD-10-CM

## 2025-03-13 DIAGNOSIS — I50.20 HFREF (HEART FAILURE WITH REDUCED EJECTION FRACTION) (HCC): ICD-10-CM

## 2025-03-13 PROCEDURE — 99214 OFFICE O/P EST MOD 30 MIN: CPT | Performed by: STUDENT IN AN ORGANIZED HEALTH CARE EDUCATION/TRAINING PROGRAM

## 2025-03-13 NOTE — PROGRESS NOTES
Name: Vesna Langston      : 1960      MRN: [id number]  Encounter Provider: Genaro Caceres DO  Encounter Date: 3/13/2025   [facility]  :  Assessment & Plan  OPP (obstructive sleep apnea)  Vesna is a very pleasant 64-year-old woman with a PMHx of CAD status post MI with stent and ICD placement and subsequent ischemic cardiomyopathy and HFrEF (EF 30% 2024), atrial flutter on Eliquis, chronic low back pain who presents in follow up for POP (AHI 14.3, O2 lou 84% 2024).  She does endorse some benefit from the device, however she is still having difficulties with leak and a sensation of too much pressure.  Reassuringly, it does not appear that her pressure settings are not adequate, however she does have high leak, which likely is contributing substantially to her symptoms and resulting in a supratherapeutic AHI.  She is also having difficulties with her DME, and wishes to switch it.      Compliance report reviewed and analyzed  Continue AutoPAP at settings of  11-15 cmH2O  Mask fitting ordered today  Also reviewed and provided links to CPAP pillows  Prescription for new supplies ordered today  Per patient's request, will change DME to [facility].  Reviewed CMS/insurance requirements and resupply guidelines  Information provided on the above as well as general maintenance steps  Recommended maintaining good Sleep Hygiene  She did also endorse some aerophagia symptoms; if these continue despite alternate masks, she may benefit from a BiPAP titration study in the future.    Orders:    PAP DME Resupply/Reorder    Mask fitting only; Future    HFrEF (heart failure with reduced ejection fraction) ([facility])  Wt Readings from Last 3 Encounters:   25 103 kg (227 lb)   25 99.3 kg (219 lb)   03/10/25 99.3 kg (219 lb)       Reviewed the importance of treating SDB on cardiovascular risk reduction (including but not limited to risk of heart attack, CHF, and both initial  occurrence of A-fib and recurrence of A-fib following ablation or similar intervention)  Defer primary/medical management of patient's CHF per cardiology and/or patient's PCP.                 Follow-up:  She will Return for Follow-up in 3-6 months.      ________________________________________________________________________________________________    Per Last Visit Note (Date: 9/19/2024):  Vesna is a very pleasant 64-year-old woman  with PMHx of CAD status post MI with stent and ICD placement and subsequent ischemic cardiomyopathy and HFrEF (EF 30% 5/2024), atrial flutter on Eliquis, chronic low back pain who presents for sleep disordered breathing evaluation.  Certainly, she has several symptoms of this along with her extensive cardiac history/comorbidities, thus evaluation for and treatment of sleep disordered breathing if present is merited.  Of note, it also appears that she has an advanced sleep schedule, however this is not bothersome to her at this time.     Discussed with patient the pathophysiology of OSAS and medical conditions associated with OSAS such as DM, HTN, CAD, Depression, Stroke, Headache.  Treatment options including surgery, Dental appliances, positional therapy, and CPAP were discussed.  I have ordered a split-night polysomnogram to evaluate for sleep-disordered breathing and the most effective PAP pressure setting.  Given her heart failure and intermittent opioid use, I would also like to ensure there are no additional central apneic events not controlled with standard PAP therapy, which a split-night study will help address.  Advised patient to avoid activities that could harm self or others when tired/sleepy, including driving.  Encouraged maintaining normal weight  Discussed importance of good sleep hygiene.  Pending the results of the sleep study, we will plan to order CPAP with a subsequent compliance follow-up.  She will Return for Follow-up pending results of sleep study..    "      Sleep Studies:  -Split Night 9/23/2024: BMI: 32.8. DIAGNOSTIC: TST: 122 minutes, Sleep efficiency: 91.3%. Sleep Onset Latency: 0.7 minutes, REM Onset Latency: 83 minutes. AHI: 14.3, Supine AHI: --, REM AHI: 26.4. O2 lou: 84%, Time below 90%: 13.8 minutes. PLM Index: 0, PLM Arousal Index: 0. THERAPEUTIC: Titration started at 4 cmH2O, titrated to 11 cmH2O. Best pressure noted to be 11 cmH2O. O2 lou was still 88% at final pressure.      ________________________________________________________________________________________________      Interval History: Vesna Langston is a 64 y.o. female with a PMHx of CAD status post MI with stent and ICD placement and subsequent ischemic cardiomyopathy and HFrEF (EF 30% 5/2024), atrial flutter on Eliquis, chronic low back pain who presents in follow up for POP (AHI 14.3, O2 lou 84% 9/2024).    Now on Ozempic (after fighting with insurance)    SDB:  -Current experience with PAP Therapy: Does endorse benefit, particularly if she ises it the entire night, but is having issues with leaking and a sensation of too much pressure.   -Mask type: Nasal with chinstrap  -Difficulties with mask: Leaking; does state \"if I tighten it further I get marks\" [that last for ~1 hour]  -Device: ResMed AirSense 10; received 1/2024.  -Difficulties with device: Aerophagia  -DME: [employer]; wants to switch to an alternate DME  -Compliance:              Sitting and reading: (Patient-Rptd) (P) Slight chance of dozing  Watching TV: (Patient-Rptd) (P) Moderate chance of dozing  Sitting, inactive in a public place (e.g. a theatre or a meeting): (Patient-Rptd) (P) Would never doze  As a passenger in a car for an hour without a break: (Patient-Rptd) (P) Slight chance of dozing  Lying down to rest in the afternoon when circumstances permit: (Patient-Rptd) (P) Moderate chance of dozing  Sitting and talking to someone: (Patient-Rptd) (P) Would never doze  Sitting quietly after a lunch without alcohol: " (Patient-Rptd) (P) Would never doze  In a car, while stopped for a few minutes in traffic: (Patient-Rptd) (P) Would never doze  Total score: (Patient-Rptd) (P) 6     Review of Systems  Pertinent positives/negatives included in HPI and also as noted:     Current Outpatient Medications on File Prior to Visit   Medication Sig Dispense Refill    albuterol (ProAir HFA) 90 mcg/act inhaler Inhale 2 puffs every 6 (six) hours as needed for wheezing 8.5 g 0    apixaban (ELIQUIS) 5 mg Take 1 tablet (5 mg total) by mouth 2 (two) times a day 180 tablet 1    atorvastatin (LIPITOR) 80 mg tablet TAKE 1 TABLET BY MOUTH EVERY DAY IN THE EVENING 90 tablet 1    clopidogrel (PLAVIX) 75 mg tablet Take 1 tablet (75 mg total) by mouth daily 90 tablet 3    diphenhydrAMINE (BENADRYL) 25 mg capsule Take 25 mg by mouth every 6 (six) hours as needed for itching      dofetilide (TIKOSYN) 125 mcg capsule Take 1 capsule (125 mcg total) by mouth every 12 (twelve) hours 180 capsule 3    Empagliflozin (Jardiance) 25 MG TABS Take 1 tablet (25 mg total) by mouth every morning 30 tablet 17    ergocalciferol (VITAMIN D2) 50,000 units Take 1 capsule (50,000 Units total) by mouth once a week 12 capsule 1    fluticasone (FLONASE) 50 mcg/act nasal spray 1 spray into each nostril daily 9.9 mL 0    furosemide (LASIX) 20 mg tablet PLEASE SEE ATTACHED FOR DETAILED DIRECTIONS      furosemide (LASIX) 40 mg tablet Take 1 tablet (40 mg total) by mouth if needed (if worse shortness of breath or weight up 3lbs) 60 tablet 17    glimepiride (AMARYL) 1 mg tablet Take 1 tablet (1 mg total) by mouth daily with breakfast 90 tablet 3    isosorbide mononitrate (IMDUR) 30 mg 24 hr tablet TAKE 1 TABLET (30 MG TOTAL) BY MOUTH 2 (TWO) TIMES A DAY 30 MG IN THE AM AND 30 MG IN THE  tablet 1    levothyroxine (Synthroid) 75 mcg tablet Take 1 tablet (75 mcg total) by mouth daily 90 tablet 3    meclizine (ANTIVERT) 25 mg tablet Take 1 tablet (25 mg total) by mouth every 8 (eight)  "hours as needed for dizziness 30 tablet 0    metoprolol succinate (TOPROL-XL) 25 mg 24 hr tablet Take 2 tablets (50 mg total) by mouth daily at bedtime 180 tablet 5    nitroglycerin (NITROSTAT) 0.4 mg SL tablet PLACE 1 TABLET UNDER THE TONGUE EVERY 5 MINUTES AS NEEDED FOR CHEST PAIN. 25 tablet 1    pantoprazole (PROTONIX) 40 mg tablet TAKE 1 TABLET BY MOUTH 2 TIMES A DAY BEFORE MEALS. 180 tablet 1    potassium chloride (Klor-Con M20) 20 mEq tablet Take 1 tablet (20 mEq total) by mouth if needed (on lasix days) 90 tablet 1    pregabalin (LYRICA) 75 mg capsule Take 1 capsule (75 mg total) by mouth 3 (three) times a day 90 capsule 2    sacubitril-valsartan (Entresto) 49-51 MG TABS Take 1 tablet by mouth 2 (two) times a day 60 tablet 5    semaglutide, 1 mg/dose, (Ozempic) 4 mg/3 mL injection pen Inject 0.75 mL (1 mg total) under the skin once a week for 28 days 3 mL 0    [START ON 3/17/2025] semaglutide, 2 mg/dose, (Ozempic) 8 mg/ mL injection pen Inject 0.75 mL (2 mg total) under the skin every 7 days Do not start before March 17, 2025. (Patient not taking: Reported on 3/10/2025 Do not start before March 17, 2025.) 9 mL 1     No current facility-administered medications on file prior to visit.      Objective   /73 (BP Location: Left arm, Patient Position: Sitting, Cuff Size: Large)   Pulse 69   Temp 97.7 °F (36.5 °C) (Temporal)   Resp 16   Ht 5' 8\" (1.727 m)   Wt 103 kg (227 lb)   SpO2 98%   BMI 34.52 kg/m²     Neck Circumference: 17  Physical Exam  PHYSICAL EXAMINATION:  Vital Signs: /73 (BP Location: Left arm, Patient Position: Sitting, Cuff Size: Large)   Pulse 69   Temp 97.7 °F (36.5 °C) (Temporal)   Resp 16   Ht 5' 8\" (1.727 m)   Wt 103 kg (227 lb)   SpO2 98%   BMI 34.52 kg/m²     Constitutional: NAD, well appearing   Mental Status: AAOx3  Skin: Warm, dry, no rashes noted   Eyes: PERRL, normal conjunctiva  Chest: No evidence of respiratory distress, no accessory muscle use; no evidence of " peripheral cyanosis  Abdomen: Soft, NT/ND  Extremities: No digital clubbing or pedal edema  Neuro: Strength 5/5 throughout, sensation grossly intact    Data  Lab Results   Component Value Date    HGB 12.2 01/14/2025    HCT 38.8 01/14/2025    MCV 84 01/14/2025      Lab Results   Component Value Date    GLUCOSE 168 (H) 04/07/2024    CALCIUM 9.7 01/14/2025    K 4.1 01/14/2025    CO2 27 01/14/2025     01/14/2025    BUN 17 01/14/2025    CREATININE 0.97 01/14/2025     Lab Results   Component Value Date    IRON 68 04/07/2024    TIBC 307 04/07/2024    FERRITIN 59 04/07/2024     Lab Results   Component Value Date    AST 17 01/14/2025    ALT 18 01/14/2025         Electronically signed by:    Genaro Caceres DO  Board-Certified Neurology and Sleep Medicine  [facility]  03/13/25

## 2025-03-13 NOTE — ASSESSMENT & PLAN NOTE
Vesna is a very pleasant 64-year-old woman with a PMHx of CAD status post MI with stent and ICD placement and subsequent ischemic cardiomyopathy and HFrEF (EF 30% 5/2024), atrial flutter on Eliquis, chronic low back pain who presents in follow up for POP (AHI 14.3, O2 lou 84% 9/2024).  She does endorse some benefit from the device, however she is still having difficulties with leak and a sensation of too much pressure.  Reassuringly, it does not appear that her pressure settings are not adequate, however she does have high leak, which likely is contributing substantially to her symptoms and resulting in a supratherapeutic AHI.  She is also having difficulties with her DME, and wishes to switch it.      Compliance report reviewed and analyzed  Continue AutoPAP at settings of 11-15 cmH2O  Mask fitting ordered today  Also reviewed and provided links to CPAP pillows  Prescription for new supplies ordered today  Per patient's request, will change DME to [employer] [facility].  Reviewed CMS/insurance requirements and resupply guidelines  Information provided on the above as well as general maintenance steps  Recommended maintaining good Sleep Hygiene  She did also endorse some aerophagia symptoms; if these continue despite alternate masks, she may benefit from a BiPAP titration study in the future.    Orders:    PAP DME Resupply/Reorder    Mask fitting only; Future

## 2025-03-13 NOTE — PATIENT INSTRUCTIONS
Continue PAP Therapy  Continue AutoPAP at settings of 11-15 cmH2O  Remember to clean your mask and equipment regularly, as directed.  I am ordering a formal mask fitting appointment; this is an appointment with the DME to ensure that you have the optimal mask and fit for your face structure    See links for CPAP Pillows below  You should be eligible for new supplies approximately every 3-6 months, depending on your insurance coverage. Contact your Durable Medical Equipment (DME) company for new supplies as needed.  Practice good Sleep Hygiene, as outlined below.  Follow-up in 3-6 months       Care and Maintenance  Headgear should be washed as needed. Daily inspection and weekly washings are recommended. Do not disassemble the straps. Machine wash in warm water, making sure to attach Velcro hooks and tabs before washing. Line dry or machine dry on a low setting.  Masks should be washed every day. Daily inspection is recommended. Leave the mask and tubing attached. Gently wash the mask with a soft cloth using warm water and mild detergent, concentrating on the mask cushion flaps. DO NOT use alcohol or bleach. Rinse thoroughly and air dry.  Tubing and headgear should be washed weekly. Daily inspection is recommended. Wash in warm water and mild detergent and rinse thoroughly. Hook the tubing to the machine and blow until dry.  Humidifier should be washed daily and filled with DISTILLED water before use. Wash with warm water and mild detergent. Rinse thoroughly and air dry.  Disposable filters should be replaced once a month. Wash reusable foam filters with warm water and mild detergent at least once a month. Rinse thoroughly and dry with paper towels.  Avoid  that contain fragrance or conditioners, as these will leave a residue.  NEVER iron any soft goods.      CMS Requirements    Your insurance requires a face-to-face follow up visit within a 31-90 day period after starting CPAP.  Your insurance requires  compliance with CPAP, which is at least 4 hours per night for 70% of the time. This must be done over a 30 day period and must occur within the initial 31-90 day period after starting CPAP.  Your insurance also requires at least yearly follow ups to continue to pay for CPAP supplies.       PAP Supply Guidelines    Below are the guidelines for reordering your supplies. You will be responsible for your deductible, co payments, and out of pocket expenses.    Item Frequency   Nasal Mask (no headgear) 1 every 3 months   Nasal Mask Cushion 1 every 2 weeks   Full Face Mask (no headgear) 1 every 3 months   Full Face Mask Cushion 1 every month   Nasal Pillows 1 every 2 weeks   Headgear 1 every 6 months   hin Strap 1 every 6 months   samantha 1 every 3 months   Filters: Reusable 1 every 6 months   Filters: Disposable 1 every 2 weeks   Humidifier Chamber(disposable) 1 every 6 months         Good Sleep Hygiene  Wake up at the same time every day, even on the weekends.  Use your bed for sleep and intimacy only.  If you have been in bed awake for 30 minutes, get up and leave the bedroom. Choose a dull activity not involving a blue screen (TV, computer, handheld devices). Go back to bed when you feel sleepy.  Avoid caffeine, nicotine and alcohol before you go to bed.  Avoid large meals before you go to bed.  Avoid using screens (computers, tablets, smartphones, etc.) for at least 1 hour before bedtime  Exercise regularly, but do not exercise right before you go to bed.  Avoid daytime naps. If you do take a nap, sleep for 20-40 minutes, and not after 2pm.       Below are a few links to CPAP pillows. These can help you sleep better on your side with a CPAP mask. There is not a specific pillow I recommend, it is more based on what each individual finds comfortable.      https://www.sleepfoundation.org/best-pillows/best-cpap-pillows    https://www.[employer]/ip/NEW-Premium-CPAP-Pillow-Side-Sleepers-Sleep-Apnea-Memory-Foam-Free  Cover/807076242    https://www.[employer]/store/product/contour-living-cpap-2-0-fmyhiafpmc-airway-alignment-pillow in-  white/9670802004?sku ed=93427197&store=&enginename=[employer]&mcid=PS_googlepla_nonbrand_sscbeddin online&product ff=21191906&adtype=&product channel=online&adpos=&nixxeldn=376263717721&device  =c&matchtype=&network=u&gclid=[id number] [id number]_xmB&gclsrc=aw .ds

## 2025-03-13 NOTE — ASSESSMENT & PLAN NOTE
Wt Readings from Last 3 Encounters:   03/13/25 103 kg (227 lb)   03/11/25 99.3 kg (219 lb)   03/10/25 99.3 kg (219 lb)       Reviewed the importance of treating SDB on cardiovascular risk reduction (including but not limited to risk of heart attack, CHF, and both initial occurrence of A-fib and recurrence of A-fib following ablation or similar intervention)  Defer primary/medical management of patient's CHF per cardiology and/or patient's PCP.              No

## 2025-03-14 ENCOUNTER — TELEPHONE (OUTPATIENT)
Dept: SLEEP CENTER | Facility: CLINIC | Age: 65
End: 2025-03-14

## 2025-03-14 LAB
DME PARACHUTE DELIVERY DATE REQUESTED: NORMAL
DME PARACHUTE ITEM DESCRIPTION: NORMAL
DME PARACHUTE ORDER STATUS: NORMAL
DME PARACHUTE SUPPLIER NAME: NORMAL
DME PARACHUTE SUPPLIER PHONE: NORMAL

## 2025-03-17 ENCOUNTER — TELEPHONE (OUTPATIENT)
Dept: SLEEP CENTER | Facility: CLINIC | Age: 65
End: 2025-03-17

## 2025-03-17 NOTE — TELEPHONE ENCOUNTER
Received request via fax from [employer] requesting progress notes documenting CPAP compliance and improvement of symptoms on PAP.      Copy of 3/13/25 office note faxed to [employer] [phone].

## 2025-03-18 ENCOUNTER — TELEPHONE (OUTPATIENT)
Age: 65
End: 2025-03-18

## 2025-03-18 NOTE — TELEPHONE ENCOUNTER
Caller: Patient     Doctor: Dr. Mcrae    Reason for call:  Checking on the status f her FMLA forms - Aware they where received on 3/7 and it takes 10-14 business days to be completed. Asking if they can be sent to her portal once completed     Call back#: [phone]

## 2025-03-18 NOTE — TELEPHONE ENCOUNTER
----- Message from Linsey MCKINNON sent at 8/8/2024  3:59 PM EDT -----  Regarding: Med pricing for admission  Per Dr. Lama --    Please get med pricing for med admission.    Dofetilide 500 mcg, bid  Sotalol 160 mg, bid    Thanks   Seen at  bedside in am, no overnight issues.  BP improving.       Blood pressure 95/61, pulse 78, temperature 98.1 °F (36.7 °C), temperature source Oral, resp. rate 18, height 5' 6\" (1.676 m), weight 57.1 kg (125 lb 14.1 oz), SpO2 95%.      General: Alert in no acute distress, ill appearing  Head and Neck: craniotomy incision CDI. Conjunctivae clear,  hearing is normal. Neck supple. Tongue deep red and smooth/shiny at the front and sides 1cm  Respiratory: Lungs clear to auscultation bilaterally, no wheezing. Normal respiratory effort. Med port in R chest.  Pleurx in left chest  Cardiovascular: Regular rate and rhythm, no murmurs, rubs, or gallops.   Gastrointestinal: Abdomen is soft nontender, nondistended,  no palpable masses. BS normal  Neuro: Cranial nerves grossly intact,  no focal deficit. Speech is normal. Moves all extremities.   Skin: No rash, no jaundice.  Extremities: No swelling, No calf tenderness. RUE picc  Psych: Appropriate mood and affect.        Recent Results (from the past 24 hour(s))   GLUCOSE, BEDSIDE - POINT OF CARE    Collection Time: 03/17/25  1:06 PM    Specimen: Blood   Result Value Ref Range    GLUCOSE, BEDSIDE - POINT OF CARE 92 70 - 99 mg/dL   GLUCOSE, BEDSIDE - POINT OF CARE    Collection Time: 03/17/25  5:48 PM    Specimen: Blood   Result Value Ref Range    GLUCOSE, BEDSIDE - POINT OF CARE 117 (H) 70 - 99 mg/dL   GLUCOSE, BEDSIDE - POINT OF CARE    Collection Time: 03/17/25 11:31 PM    Specimen: Blood   Result Value Ref Range    GLUCOSE, BEDSIDE - POINT OF CARE 157 (H) 70 - 99 mg/dL   Basic Metabolic Panel    Collection Time: 03/18/25  3:46 AM    Specimen: Blood, Venous   Result Value Ref Range    Fasting Status      Sodium 144 135 - 145 mmol/L    Potassium 4.0 3.4 - 5.1 mmol/L    Chloride 110 97 - 110 mmol/L    Carbon Dioxide 25 21 - 32 mmol/L    Anion Gap 13 7 - 19 mmol/L    Glucose 102 (H) 70 - 99 mg/dL    BUN 33 (H) 6 - 20 mg/dL    Creatinine 0.43 (L) 0.51 - 0.95 mg/dL     Glomerular Filtration Rate >90 >=60    BUN/Cr 77 (H) 7 - 25    Calcium 7.9 (L) 8.4 - 10.2 mg/dL   Magnesium    Collection Time: 03/18/25  3:46 AM    Specimen: Blood, Venous   Result Value Ref Range    Magnesium 1.8 1.7 - 2.4 mg/dL   Phosphorus    Collection Time: 03/18/25  3:46 AM    Specimen: Blood, Venous   Result Value Ref Range    Phosphorus 3.8 2.4 - 4.7 mg/dL   CBC No Differential    Collection Time: 03/18/25  3:46 AM    Specimen: Blood, Venous   Result Value Ref Range    WBC 2.9 (L) 4.2 - 11.0 K/mcL    RBC 2.70 (L) 4.00 - 5.20 mil/mcL    HGB 7.5 (L) 12.0 - 15.5 g/dL    HCT 24.4 (L) 36.0 - 46.5 %    MCV 90.4 78.0 - 100.0 fl    MCH 27.8 26.0 - 34.0 pg    MCHC 30.7 (L) 32.0 - 36.5 g/dL    PLT 64 (L) 140 - 450 K/mcL    RDW-CV 16.6 (H) 11.0 - 15.0 %    RDW-SD 55.0 (H) 39.0 - 50.0 fL    NRBC 0 <=0 /100 WBC   Electrocardiogram 12-Lead    Collection Time: 03/18/25  4:51 AM   Result Value Ref Range    Ventricular Rate EKG/Min (BPM) 61     Atrial Rate (BPM) 61     OR-Interval (MSEC) 130     QRS-Interval (MSEC) 74     QT-Interval (MSEC) 386     QTc 389     P Axis (Degrees) 40     R Axis (Degrees) 18     T Axis (Degrees) 27     REPORT TEXT       Normal sinus rhythm  Normal ECG  When compared with ECG of  08-DEC-2024 00:38,  Vent. rate  has decreased  BY  34 BPM  Nonspecific T wave abnormality no longer evident in  Anterolateral leads     GLUCOSE, BEDSIDE - POINT OF CARE    Collection Time: 03/18/25  5:59 AM    Specimen: Blood   Result Value Ref Range    GLUCOSE, BEDSIDE - POINT OF CARE 76 70 - 99 mg/dL         Assessment & Plan  54 yo female with hx of stage III follicular lymphoma, recurrent ascites, anxiety, depression, and migraines when presented after a fall from wheelchair on 2/17/2025      # S/p fall  # new bifrontal lesion/ edema with mass effect on frontal horns of lateral ventricles bilaterally.   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               palliative intent targeted therapy with Ibrutinib 560mg PO daily, started 3/10  Now on Dexamethasone  2mg daily for 7 days then OFF.   Neuro checks  No AP/NSAIDs/Therapeutic AC             Fall precautions             Pt/ot     Right elbow and wrist pain following a fall as above  XR WRIST 3 OR MORE VIEWS RIGHT - No fracture or dislocation   XR ELBOW 4 VIEWS RIGHT - No fracture or dislocation              on scheduled Tylenol             Lidocaine patch                  # Large pleural effusion  Large ascites  # History of recurrent paracenteses  History of recurrent thoracentesis  # History ofGrade 3A Follicular Lymphoma   # S/p -R-CHOP therapy last received 10/2  CT LUMBAR SPINE WO CONTRAST - Large left pleural effusion and large volume of partially imaged ascites             Oncology consulted  S/p Ultrasound thoracentesis on 2/18 removed 1050 mL   s/p ultrasound paracentesis on 2/20 and albumin given remove 5300 mL.  Patient usually gets weekly paracentesis as per patient and the mother  Pulmonology consulted.  - IR guided pleurx drain placed 2/26  -CT head 3/11 neg for acute changes (ordered for lethargy and left eye changes)      Glossitis: trial of magic mouthwash     Elevated alk phos -alk phos 53, monitor     # Severe protein calorie malnutrition  #Protein losing enteropathy   S/p EGD with video capsule endoscopy on 1/6 - recommended subsequent eval at quaternary care center such as [facility], [facility], or [facility] for evaluation.             Patient currently on TPN              Albumin 1.9, total protein 5.0  Nutritional consult  Blood glucose check prn  Wait a few days and if stable will start marinol trial     Anemia of chronic disease             Monitor hemoglobin     Grade 3A  Follicular Lymphoma   Significant bifrontal enhancing lesion with profound surrounding cerebral edema  -R-CHOP therapy last received 10/2  - End of treatment PET-CT shows some residual lymphadenopathy, but SUV is low and we favor reactive etiology. Any aggressive lymphoma has now resolved. Multiple scopes, biopsies, and cytology analyses of her fluid have all been NEGATIVE for lymphoma,              Oncology following peripherally at this point             Today with pancytopenia, will reach out to oncology if cell lines cont to decline                  Anemia of chronic disease             Hemoglobin 10.3 (02/17) >>8.5                  Chronic hypotension   On Midodrine 5 mg q 8hrs -continue  Monitor BP     Anxiety/Major depression              Takes -Klonopin at home -continue             Given lethargy mirtazapine stopped (given on evening of 3/10, then stopped)     Hx of Migraine Headache  Continue PTA Rizatriptan 10mg QID PRN      Cough secondary to RSV    RPP positive for RSV 2/24  improved    FEN  Fluids: TPN  Electrolytes: stable  Nutrition: TPN, IDDSI 7  Nutrition status: Malnutrition chronic; severe: Severe depletion of muscle mass, Severe depletion of body fat. Intervention:Coordination of Nutrition care by a nutritional Professional, Nutritional Supplemental therapy. See RD Note for Current recommendations.   DVT Ppx: SCDs  Dispo: care manager assisting with placement.         Pleurex catheter in place.      Code Status    Code Status: Full Resuscitation

## 2025-03-19 ENCOUNTER — OFFICE VISIT (OUTPATIENT)
Dept: OBGYN CLINIC | Facility: CLINIC | Age: 65
End: 2025-03-19
Payer: COMMERCIAL

## 2025-03-19 VITALS — WEIGHT: 227 LBS | HEIGHT: 68 IN | BODY MASS INDEX: 34.4 KG/M2

## 2025-03-19 DIAGNOSIS — M17.11 PRIMARY OSTEOARTHRITIS OF RIGHT KNEE: ICD-10-CM

## 2025-03-19 DIAGNOSIS — M23.300 DEGENERATIVE TEAR OF LATERAL MENISCUS OF RIGHT KNEE: ICD-10-CM

## 2025-03-19 DIAGNOSIS — M25.561 CHRONIC PAIN OF RIGHT KNEE: Primary | ICD-10-CM

## 2025-03-19 DIAGNOSIS — M25.461 PAIN AND SWELLING OF RIGHT KNEE: ICD-10-CM

## 2025-03-19 DIAGNOSIS — M25.561 PAIN AND SWELLING OF RIGHT KNEE: ICD-10-CM

## 2025-03-19 DIAGNOSIS — G89.29 CHRONIC PAIN OF RIGHT KNEE: Primary | ICD-10-CM

## 2025-03-19 PROCEDURE — 20610 DRAIN/INJ JOINT/BURSA W/O US: CPT | Performed by: STUDENT IN AN ORGANIZED HEALTH CARE EDUCATION/TRAINING PROGRAM

## 2025-03-19 PROCEDURE — 99213 OFFICE O/P EST LOW 20 MIN: CPT | Performed by: STUDENT IN AN ORGANIZED HEALTH CARE EDUCATION/TRAINING PROGRAM

## 2025-03-19 RX ADMIN — BUPIVACAINE HYDROCHLORIDE 2 ML: 2.5 INJECTION, SOLUTION INFILTRATION; PERINEURAL at 10:15

## 2025-03-19 NOTE — PROGRESS NOTES
1. Chronic pain of right knee  Injection Procedure Prior Authorization    Large joint arthrocentesis: R knee      2. Primary osteoarthritis of right knee  Injection Procedure Prior Authorization    Large joint arthrocentesis: R knee      3. Degenerative tear of lateral meniscus of right knee  Injection Procedure Prior Authorization    Large joint arthrocentesis: R knee      4. Pain and swelling of right knee  Large joint arthrocentesis: R knee                Orders Placed This Encounter   Procedures    Large joint arthrocentesis: R knee    Injection Procedure Prior Authorization        Imaging Studies (I personally reviewed images in PACS and report):      X-ray right knee 7/26/2024: Mild tricompartmental osteophytic changes evidence by marginal osteophytes.  Superior patellar enthesophyte from quadriceps tendon.  Small joint effusion.                IMPRESSION:  Acute on chronic right knee pain  Of note h/o fall on right knee in 2017-she has an MRI report as noted above indicating osteoarthritic changes as well as medial/lateral meniscal tears  Pain have an aggravating initially after starting cardiac rehab due to CHF.  Patient noting that she is trying to become more active and go on 5K walks.  However her right knee pain is a limiting factor as pain worsens with prolonged walking  Radiographic imaging notes degenerative changes.  MRI noting degenerative changes/degenerative meniscal tearing, ganglion cyst  Patient has noted in the past that she was told she cannot have CSI due to potential side effect of strain on her heart  On 10/8 in which hinged knee brace was prescribed for stability  Improvement of knee pain with viscosupplementation injections on 10/16/2024.  Here today due to exacerbation of right knee swelling/stiffness with activities. Has been treating with icing/elevation and swelling had progressively receded -interested in attempted aspiration today to further reduce swelling/stiffness if  "possible  Upcoming plan for visco injection of right knee prior to her lumbar surgery on 5/1/2025 with PT afterwards and would not want right knee pain/OA being a limiting factor after surgery      Other factors:  History of lumbar fusion - upcoming lumbar surgery on 5/1/2025  BMI 34  CHF - reportedly under restrictions of no strenuous activities and seeing cardiac rehab currently  History of defibrillator implant  Type 2 diabetes    PLAN:    Clinical exam and radiographic imaging reviewed with patient today, with impression as per above. I have discussed with the patient the pathophysiology of this diagnosis and reviewed how the examination correlates with this diagnosis.    Treatment options were discussed including risks benefits and patient was agreeable to an attempt to aspirate her right knee today as per procedure note.  However I was unable to aspirate any significant amount of fluid.  Counseled to continue conservative treatments of icing, compression wrapping and range of motion movements as tolerated to help facilitate reducing the swelling conservatively.  Placed order for viscosupplementation injection of her right knee with plan to perform around 4/16/2025.    Return for Follow up after obtaining visco injection for right knee (expected 4/16/2025).    Portions of the record may have been created with voice recognition software. Occasional wrong word or \"sound a like\" substitutions may have occurred due to the inherent limitations of voice recognition software. Read the chart carefully and recognize, using context, where substitutions have occurred.     CHIEF COMPLAINT:  Right knee pain follow up      HPI:  Vesna Langston is a 64 y.o. female  who presents for     Visit 3/19/2025:  Follow-up evaluation of right knee pain  Pertinent history as per below.  On last visit viscosupplementation order was placed for right knee.  There was a plan to perform this procedure in April, 2025.  Here today complaining of " worsening knee pain, swelling, stiffness.  Denies new injury of right knee.  Noticed during physical therapy that she is experiencing worsening stiffness, swelling of her knee and pain over the lateral aspect.  Denies an injury.  This would occur last week.  She states she has been treating with use of ice, elevation, range of motion movements and feels that these interventions have progressively reduced her swelling.  She is unsure whether there is any lingering swelling left that can be aspirated.     Visit 2/19/2025:  Follow-up right knee pain/osteoarthritis  Pertinent medical history as per above  Of note patient underwent a viscosupplementation injection of her right knee on 10/16/2024. There is plan for lumbar surgery based on a note from 2/4/2025 from Dr. Mcrae.  This is planned on 5/1/2025.  Patient had responded very well to the Visco injection of her right knee previously.  She states overall she has been doing well with only intermittent episodes of aching mildly over her anterior medial knee with ADLs.  Denies any new injuries of her right knee since last visit.  Patient is here today to start the process of reordering the viscosupplementation injection for her right knee.  She is aware she can only do it every 6 months and would like to have this performed prior to her surgery.  She states after the surgery they will be extensive physical therapy and would not want her knee pain being a limiting factor during therapy.    Visit 10/30/2024:  Follow-up right knee pain  History of Visco injection on 10/3/2024.  History as per below  Patient reports improvement of her knee pain since this injection.  She reports improvements include intact range of motion and ability to walk much further with minimal to no pain of her knee.  She states at this point she feels a mild aching sensation over the anteromedial aspect but otherwise tolerable.  She states that she wears a compression knee sleeve for most of the  "day and feels that it helps.  She still does plan to wear hinged knee brace when returning back to her job.  She states she still under work accommodations in regards to her low back but would need a clearance letter regards to her right knee going forward for work.      Visit 10/16/2024:  Follow-up evaluation of right knee pain  Of note patient underwent a viscosupplementation injection of her right knee on 10/3/2024  She has a history of a meniscus tear of his right knee that she did not ultimately undergo surgical intervention -reportedly managed conservatively with cortisone injections, bracing, physical therapy.  However, due to her history of congestive heart failure, STEMI, patient was told to no longer receive cortisone injections of her right knee or prednisone in general.  On last visit 10/8/2024, patient reported a sense of worsening pain and instability from excessive walking.  I did prescribe her a hinged knee brace at that time that briefly did provide relief and support of her knee.  However patient had to go to the ER yesterday due to worsening pain, swelling and bruising of her knee.  She denies any new injury of her knee.    Visit 10/8/2024:  Follow-up evaluation of right knee pain  Patient recently seen on 10/3/2024 for which she was given a viscosupplementation injection of her right knee.  Patient admits that she may have been \"excessively\" using her right lower extremity for weightbearing after this injection.  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  She does not do any squatting, climbing ladders or working at elevations.  She also has been trying to be more active and went on a 10 mile walk.  She states the ground was flat and did not aggravate her knee much.  However the following day she went [place] [employer] with her grandchildren.  She states the ground was much more unsteady and aggravating to her knee and towards the end of the day she felt a sense " of instability of her knee, stiffness and sharp/aching pains over the medial/lateral aspects.  She states her instability was bad enough that she had to use a cane.  She does not have a brace but is here today asking whether we can supplement her brace.    Visit 9/17/2024:  Follow-up of ration of right knee pain  Of note history as per below.  Patient has an old MRI noting arthritic changes as well as medial/lateral meniscus tears in the past.  We had talked about a cortisone injection of her knee but at the time she was told her cardiologist to restrict her from prednisone/cortisone due to her cardiac disease history.  There was consideration for viscosupplementation injection of her knee but it was deferred at the time.  However, today patient states she is interested in pursuing the Visco injection for her right knee.  She states she is try to be more active and attend 5K walks but feels that with prolonged walking she has worsening medial/lateral aching/sharp pains, swelling, stiffness and has to rest frequently.  She does feel that use of the compression knee sleeve has helped mitigate some of the pain with activities.  She denies any new injuries of her right knee since last visit.  She reports continued crepitus of her knee.  She is asking whether I could reach out to her cardiologist to determine the risk of a cortisone injection for the future but would like to pursue a Visco injection first.    Separately, patient also wanted to address a left index finger pain.  She states she has noticed a localized cystic swelling along the side of her DIP joint.  She states that it has become more aggravating to do her craft activities at home due to the pain and restricted motion of her DIP joint.  She denies any discoloration or N/T.  She denies any injury of her index finger.  She had seen her PCP for this issue and was told it was a ganglion cyst and is here today to discuss whether there are treatments for this  issue going forward there.  She states she has had prior fractures of her index finger in the past and thinks it may be due to the arthritis in her finger.    Visit 7/26/2024 :  Initial evaluation of right knee pain  Of note, patient reports a prior work injury of her right knee in the past from a fall.  She did bring in an imaging study from 2017 of her MRI as noted above.  Images are not available to review today.  Reports she did not end up undergoing surgical interventions for her right knee and was treated conservatively with injections, bracing, physical therapy for period of time.  She states overall these interventions provided some relief but never felt that her pain was fully resolved.  Furthermore, she is currently undergoing cardiac rehab given her history of systolic congestive heart failure, h/o STEMI.  She states she is under a restriction of no strenuous activities.  She states she previously underwent cortisone injections but due to her cardiac condition, she was told that she can no longer receive cortisone/prednisone.  In regards to her right knee pain, she reports has been worsening over the past several weeks.  No precipitating injury.  She states the pain is mainly over the medial aspect of her knee.  Despite being on a limited activity restriction from cardiology, she feels it can be aggravated after performing stationary biking.  Describes the pain as a burning sensation of moderate to severe intensity depending on how active she is.  She denies any noticeable swelling.  Denies any discoloration.  Reports stiffness with knee range of motion's when aggravated.  Reports crepitus of her knee which is chronic.  In regards to treatment she reports use of Tylenol, icing and resting all of which provide some relief.  She is unsure if her prior injury/meniscal tears in the past are contributing factor as she does not want to undergo surgical intervention if possible.      Medical, Surgical, Family,  and Social History    Past Medical History:   Diagnosis Date    Acute respiratory insufficiency 2024    Allergic     Chronic HFrEF (heart failure with reduced ejection fraction) ([facility])     Coronary artery disease     COVID-19 virus infection 2022    Diabetes mellitus ([facility])     Disease of thyroid gland 2024    Heart disease 3/24    Hyperlipidemia     Hypoglycemia     ICD (implantable cardioverter-defibrillator) in place     Ischemic cardiomyopathy     Lactic acidosis 2024    Myocardial infarction (H[facility]) 3/22/2024    Otitis media 1966    Seasonal allergies     ST elevation myocardial infarction involving left anterior descending (LAD) coronary artery ([facility]) 2024    Tobacco abuse     Visual impairment      Past Surgical History:   Procedure Laterality Date    ADENOIDECTOMY      APPENDECTOMY      APPENDECTOMY LAPAROSCOPIC N/A 2024    Procedure: APPENDECTOMY LAPAROSCOPIC;  Surgeon: Lauryn Ullrich, DO;  Location: [facility];  Service: General    BACK SURGERY      BREAST EXCISIONAL BIOPSY Right 2000    cyst removed- benign    CARDIAC CATHETERIZATION N/A 2024    Procedure: Cardiac pci;  Surgeon: Gabriel Myers MD;  [facility]: [facility];  Service: Cardiology    CARDIAC CATHETERIZATION N/A 2024    Procedure: Cardiac Coronary Angiogram;  Surgeon: Gabriel Myers MD;  [facility];  Service: Cardiology    CARDIAC CATHETERIZATION N/A 2024    Procedure: Cardiac PCI AMI;  Surgeon: Gabriel Myers MD;  [facility];  Service: Cardiology    CARDIAC CATHETERIZATION N/A 2024    Procedure: Cardiac IVUS;  Surgeon: Gabriel Myers MD;  [facility];  Service: Cardiology    CARDIAC DEFIBRILLATOR PLACEMENT      CARDIAC ELECTROPHYSIOLOGY PROCEDURE N/A 2024    Procedure: Cardiac icd implant;  Surgeon: Jeffy Lama MD;  [facility];  Service: Cardiology     SECTION      COLONOSCOPY      ECTOPIC  "PREGNANCY SURGERY      INSERT / REPLACE / REMOVE PACEMAKER      MASS EXCISION Left 10/18/2024    Procedure: EXCISION BIOPSY TISSUE LESION/MASS UPPER EXTREMITY - Left index finger;  Surgeon: Leandro Campos MD;  Location:  MAIN OR;  Service: Orthopedics    SEPTOPLASTY      SPINE SURGERY  23    TONSILLECTOMY       Social History   Social History     Substance and Sexual Activity   Alcohol Use Never    Alcohol/week: 1.0 standard drink of alcohol    Types: 1 Shots of liquor per week    Comment: Every 2or 3months     Social History     Substance and Sexual Activity   Drug Use Never     Social History     Tobacco Use   Smoking Status Former    Current packs/day: 0.00    Average packs/day: 1 pack/day for 24.2 years (24.2 ttl pk-yrs)    Types: Cigarettes    Start date: 2000    Quit date: 3/22/2024    Years since quittin.9    Passive exposure: Never   Smokeless Tobacco Never   Tobacco Comments    Smoked 0.5-1 ppd; quit in 2024.      Family History   Adopted: Yes   Problem Relation Age of Onset    Depression Mother     Heart disease Father     OCD Daughter      Allergies   Allergen Reactions    Fish-Derived Products - Food Allergy Anaphylaxis     Cannot have all seafood products    Shellfish-Derived Products - Food Allergy Anaphylaxis    Azithromycin Hives and Rash          Physical Exam  Ht 5' 8\" (1.727 m)   Wt 103 kg (227 lb)   BMI 34.52 kg/m²     Constitutional:  see vital signs  Gen: well-developed, normocephalic/atraumatic, well-groomed  Eyes: No inflammation or discharge of conjunctiva or lids; sclera clear   Pulmonary/Chest: Effort normal. No respiratory distress.     Ortho Exam  Right Knee Exam:  Erythema: no  Swellin+  Increased Warmth: no  Tenderness: + MJL, +LJL  ROM: 0-120  Knee flexion strength: 5/5  Knee extension strength: 5/5  Patellar Grind: +  Varus laxity: negative  Valgus laxity: negative    Gait: Normal without antalgia      Large joint arthrocentesis: R knee  Universal " "Protocol:  procedure performed by consultantConsent: Verbal consent obtained.  Risks and benefits: risks, benefits and alternatives were discussed  Consent given by: patient  Time out: Immediately prior to procedure a \"time out\" was called to verify the correct patient, procedure, equipment, support staff and site/side marked as required.  Patient understanding: patient states understanding of the procedure being performed  Site marked: the operative site was marked  Radiology Images displayed and confirmed. If images not available, report reviewed: imaging studies available  Patient identity confirmed: verbally with patient  Supporting Documentation  Indications: pain, diagnostic evaluation and joint swelling   Procedure Details  Location: knee - R knee  Preparation: Patient was prepped and draped in the usual sterile fashion  Needle size: 18 G  Ultrasound guidance: no  Approach: lateral  Medications administered: 2 mL bupivacaine 0.25 %    Aspirate amount: 0 mL  Patient tolerance: patient tolerated the procedure well with no immediate complications  Dressing:  Sterile dressing applied    Perform ultrasound assisted aspiration attendant for her right knee.  Prior to injection, needle line anesthetic of 2 cc bupivacaine was provided.  I was not able to aspirate any significant amount of fluid from knee.              "

## 2025-03-20 RX ORDER — BUPIVACAINE HYDROCHLORIDE 2.5 MG/ML
2 INJECTION, SOLUTION INFILTRATION; PERINEURAL
Status: COMPLETED | OUTPATIENT
Start: 2025-03-19 | End: 2025-03-19

## 2025-03-24 ENCOUNTER — OFFICE VISIT (OUTPATIENT)
Dept: PAIN MEDICINE | Facility: CLINIC | Age: 65
End: 2025-03-24
Payer: COMMERCIAL

## 2025-03-24 VITALS — HEIGHT: 68 IN | BODY MASS INDEX: 33.34 KG/M2 | WEIGHT: 220 LBS

## 2025-03-24 DIAGNOSIS — M47.26 OTHER SPONDYLOSIS WITH RADICULOPATHY, LUMBAR REGION: Primary | ICD-10-CM

## 2025-03-24 PROCEDURE — 99214 OFFICE O/P EST MOD 30 MIN: CPT | Performed by: ANESTHESIOLOGY

## 2025-03-24 RX ORDER — PREGABALIN 100 MG/1
100 CAPSULE ORAL 3 TIMES DAILY
Qty: 90 CAPSULE | Refills: 2 | Status: SHIPPED | OUTPATIENT
Start: 2025-03-24

## 2025-03-24 RX ORDER — HYALURONATE SODIUM, STABILIZED 60 MG/3 ML
SYRINGE (ML) INTRAARTICULAR
COMMUNITY
Start: 2025-03-21

## 2025-03-24 NOTE — LETTER
March 24, 2025     Patient: Vesna Langston  YOB: 1960  Date of Visit: 3/24/2025      To Whom it May Concern:    Vesna Langston is under my professional care. Vesna was seen in my office on 3/24/2025. Vesna may not return to work until after reassessment by neurosurgery after her surgical procedure on May 1st.     If you have any questions or concerns, please don't hesitate to call.         Sincerely,          Maxime Cameron MD        CC: No Recipients

## 2025-03-24 NOTE — PATIENT INSTRUCTIONS
Patient Education     Pregabalin (pre CT a carlos)   Brand Names: US Lyrica; Lyrica CR   Brand Names: Lu[place]ia ACH-Pregabalin; AG-Pregabalin; APO-Pregabalin; Auro-Pregabalin; DOM-Pregabalin; JAMP-Pregabalin; Lyrica; M-Pregabalin; Mar-Pregabalin; MINT-Pregabalin; ALYSSA-Pregabalin; NRA-Pregabalin; PMS-Pregabalin; HIEU-Pregabalin; SANDOZ Pregabalin; TARO-Pregabalin; TEVA-Pregabalin   What is this drug used for?   It is used to help control certain kinds of seizures.  It is used to treat painful nerve diseases.  It is used to treat fibromyalgia.  It may be given to you for other reasons. Talk with the doctor.  What do I need to tell my doctor BEFORE I take this drug?   If you are allergic to this drug; any part of this drug; or any other drugs, foods, or substances. Tell your doctor about the allergy and what signs you had.  If you have kidney disease.  If you are breast-feeding. Do not breast-feed while you take this drug.  This is not a list of all drugs or health problems that interact with this drug.  Tell your doctor and pharmacist about all of your drugs (prescription or OTC, natural products, vitamins) and health problems. You must check to make sure that it is safe for you to take this drug with all of your drugs and health problems. Do not start, stop, or change the dose of any drug without checking with your doctor.  What are some things I need to know or do while I take this drug?   Tell all of your health care providers that you take this drug. This includes your doctors, nurses, pharmacists, and dentists.  Avoid driving and doing other tasks or actions that call for you to be alert or have clear eyesight until you see how this drug affects you.  If seizures are different or worse after starting this drug, talk with the doctor.  Do not stop taking this drug all of a sudden without calling your doctor. You may have a greater risk of side effects. If you need to stop this drug, you will want to slowly stop it as  ordered by your doctor.  Avoid drinking alcohol while taking this drug.  Talk with your doctor before you use marijuana, other forms of cannabis, or prescription or OTC drugs that may slow your actions.  A very bad reaction called angioedema has happened with this drug. Sometimes, this may be life-threatening. Signs may include swelling of the hands, face, lips, eyes, tongue, or throat; trouble breathing; trouble swallowing; or unusual hoarseness. Get medical help right away if you have any of these signs.  Severe breathing problems have happened with this drug in people taking certain other drugs (like opioid pain drugs). This has also happened in people who already have lung or breathing problems. The risk may also be greater in people who are older than 65. Sometimes, breathing problems have been deadly. If you have questions, talk with the doctor.  If you are 65 or older, use this drug with care. You could have more side effects.  Talk with your doctor if you plan to father a child. This drug made male animals less fertile and caused sperm changes. Birth defects also happened in the young of male animals treated with this drug. It is not known if these problems happen in humans.  Tell your doctor if you are pregnant or plan on getting pregnant. You will need to talk about the benefits and risks of using this drug while you are pregnant.  What are some side effects that I need to call my doctor about right away?   WARNING/CAUTION: Even though it may be rare, some people may have very bad and sometimes deadly side effects when taking a drug. Tell your doctor or get medical help right away if you have any of the following signs or symptoms that may be related to a very bad side effect:  Signs of an allergic reaction, like rash; hives; itching; red, swollen, blistered, or peeling skin with or without fever; wheezing; tightness in the chest or throat; trouble breathing, swallowing, or talking; unusual hoarseness; or  swelling of the mouth, face, lips, tongue, or throat.  Change in eyesight.  Muscle pain or weakness.  Change in balance.  Feeling confused.  Shakiness.  Trouble breathing, slow breathing, or shallow breathing.  Blue or gray color of the skin, lips, nail beds, fingers, or toes.  Memory problems or loss.  Shortness of breath, a big weight gain, or swelling in the arms or legs.  Fast or abnormal heartbeat.  Fever, chills, or sore throat.  Skin sores.  Any skin change.  Trouble speaking.  Trouble sleeping.  Trouble walking.  Feeling high (easy laughing and feeling good).  Twitching.  Get medical help right away if you feel very sleepy, very dizzy, or if you pass out. Caregivers or others need to get medical help right away if the patient does not respond, does not answer or react like normal, or will not wake up.  Like other drugs that may be used for seizures, this drug may rarely raise the risk of suicidal thoughts or actions. The risk may be higher in people who have had suicidal thoughts or actions in the past. Call the doctor right away about any new or worse signs like depression; feeling nervous, restless, or grouchy; panic attacks; or other changes in mood or behavior. Call the doctor right away if any suicidal thoughts or actions occur.  Low platelet counts have rarely happened with this drug. This may lead to a higher chance of bleeding. Call your doctor right away if you have any unexplained bruising or bleeding.  What are some other side effects of this drug?   All drugs may cause side effects. However, many people have no side effects or only have minor side effects. Call your doctor or get medical help if any of these side effects or any other side effects bother you or do not go away:  Feeling dizzy, sleepy, tired, or weak.  Weight gain.  Not able to focus.  Headache.  Dry mouth.  Constipation.  Increased appetite.  Upset stomach.  Joint pain.  Nose or throat irritation.  These are not all of the side  effects that may occur. If you have questions about side effects, call your doctor. Call your doctor for medical advice about side effects.  You may report side effects to your national health agency.  You may report side effects to the FDA at 1-355.140.8628. You may also report side effects at https://www.fda.gov/medwatch.  How is this drug best taken?   Use this drug as ordered by your doctor. Read all information given to you. Follow all instructions closely.  Extended-release tablets:   Take after the evening meal if taking once daily.  Swallow whole. Do not chew, break, or crush.  Keep taking this drug as you have been told by your doctor or other health care provider, even if you feel well.  Capsules and oral solution:   Take with or without food.  Keep taking this drug as you have been told by your doctor or other health care provider, even if you feel well.  Oral solution:   Measure liquid doses carefully. Use the measuring device that comes with this drug. If there is none, ask the pharmacist for a device to measure this drug.  What do I do if I miss a dose?   Extended-release tablets:   Take a missed dose just before bedtime after eating a snack or after the next day's morning meal.  If you miss taking the missed dose after the next day's morning meal, skip the missed dose and go back to your normal time.  Do not take 2 doses at the same time or extra doses.  Capsules and oral solution:   Take a missed dose as soon as you think about it.  If it is close to the time for your next dose, skip the missed dose and go back to your normal time.  Do not take 2 doses at the same time or extra doses.  How do I store and/or throw out this drug?   Store in the original container at room temperature.  Store in a dry place. Do not store in a bathroom.  Store this drug in a safe place where children cannot see or reach it, and where other people cannot get to it. A locked box or area may help keep this drug safe. Keep  all drugs away from pets.  Throw away unused or  drugs. Do not flush down a toilet or pour down a drain unless you are told to do so. Check with your pharmacist if you have questions about the best way to throw out drugs. There may be drug take-back programs in your area.  General drug facts   If your symptoms or health problems do not get better or if they become worse, call your doctor.  Do not share your drugs with others and do not take anyone else's drugs.  Some drugs may have another patient information leaflet. If you have any questions about this drug, please talk with your doctor, nurse, pharmacist, or other health care provider.  This drug comes with an extra patient fact sheet called a Medication Guide. Read it with care. Read it again each time this drug is refilled. If you have any questions about this drug, please talk with the doctor, pharmacist, or other health care provider.  If you think there has been an overdose, call your poison control center or get medical care right away. Be ready to tell or show what was taken, how much, and when it happened.  Consumer Information Use and Disclaimer   This generalized information is a limited summary of diagnosis, treatment, and/or medication information. It is not meant to be comprehensive and should be used as a tool to help the user understand and/or assess potential diagnostic and treatment options. It does NOT include all information about conditions, treatments, medications, side effects, or risks that may apply to a specific patient. It is not intended to be medical advice or a substitute for the medical advice, diagnosis, or treatment of a health care provider based on the health care provider's examination and assessment of a patient's specific and unique circumstances. Patients must speak with a health care provider for complete information about their health, medical questions, and treatment options, including any risks or benefits  regarding use of medications. This information does not endorse any treatments or medications as safe, effective, or approved for treating a specific patient. [employer] and its affiliates disclaim any warranty or liability relating to this information or the use thereof. The use of this information is governed by the Terms of Use, available at https://www.[employer].com/en/know/clinical-effectiveness-terms.  Last Reviewed Date   2023-12-21  Copyright   © 2024 [employer] and its affiliates and/or licensors. All rights reserved.

## 2025-03-24 NOTE — PROGRESS NOTES
Assessment  1. Other spondylosis with radiculopathy, lumbar region  -     pregabalin (LYRICA) 100 mg capsule; Take 1 capsule (100 mg total) by mouth 3 (three) times a day    Improved relief of pain or improved ability to participate with IADLs with taking lyrica 75mg TID by at least 30%; Patient has completed formal PT with modest benefit. Previously reported the following symptomatology:     Axial low back pain described primarily by arthritic features.  Aching, nagging, indolent, stabbing, throbbing features in axial low back with left greater than right L3 radicular components. Physical exam showing weakness of the left lower extremity and ambulatory dysfunction. Positive facet loading maneuvers in lumbar spine elicited pain, positive tenderness to palpation over lumbar paraspinal muscles.  Findings correlate with prominent lumbar facet arthropathy seen throughout axial low back on imaging studies.Loosening of hardware noted by lucency on MRI lumbar spine. Currently she is neurologically intact without gait instability, saddle anesthesia or bowel/bladder abnormality. Scheduled for ortho spine intervention for ongoing symptoms related to pseudoarthrosis. Risks, benefits and alternatives to ALICIA vs med management thoroughly discussed with patient given multiple medical comorbidity.  Handouts provided questions answered to patient satisfaction.    Plan  -f/u 4 weeks for virtual visit  -lyrica increased to 100 mg t.i.d. Ordered for patient; counseled regarding sedative effects of taking this medication and provided up titration calendar.  Counseled not to take medication while driving or operating heavy machinery/using stairs  -has been particpant with formal physical therapy for lumbar radiculopathy; Physician directed home exercise plan as per [employer] demonstrated and handouts provided that patient plans to participate with for 1 hour, twice a week for the next 6 weeks.     There are risks associated with opioid  medications, including dependence, addiction and tolerance. The patient understands and agrees to use these medications only as prescribed. Potential side effects of the medications include, but are not limited to, constipation, drowsiness, addiction, impaired judgment and risk of fatal overdose if not taken as prescribed. The patient was warned against driving while taking sedation medications or operating heavy machinery. The patient voiced understanding. Sharing medications is a felony. At this point in time, the patient is showing no signs of addiction, abuse, diversion or suicidal ideation.     [place] Prescription Drug Monitoring Program report was reviewed and was appropriate      Complete risks and benefits including bleeding, infection, tissue reaction, nerve injury and allergic reaction were discussed. The approach was demonstrated using models and literature was provided. Verbal and written consent was obtained.     My impressions and treatment recommendations were discussed in detail with the patient who verbalized understanding and had no further questions.  Discharge instructions were provided. I personally saw and examined the patient and I agree with the above discussed plan of care.                                               Review of external notes including PT notes, PCP notes and specialist notes was performed at this visit in addition to review of new ordered imaging and past imaging to develop or modify multidisciplinary pain plan    New Medications Ordered This Visit   Medications    Durolane 60 MG/3ML injection    pregabalin (LYRICA) 100 mg capsule     Sig: Take 1 capsule (100 mg total) by mouth 3 (three) times a day     Dispense:  90 capsule     Refill:  2       History of Present Illness    Vesna Langston is a 64 y.o. female with pmhx of CAD with ICD/pacemaker, obesity, prior L3-S1 fusion, on plavix and eliquis, DM-2 on insulin, CKD-4, presenting for initial visit with chief complaint  of low back pain - referred by Dr. Mcrae for treatement options. Prior to her fusion she was treated by Dr. Ko and had good response to ESIs/Ablation in past. Pain began 2 years ago after an injury at work. She received a fusion of L3-S1 in 2023.  2 weeks after she return to work she suffered a twisting injury which caused her to return to her surgeon.  X-ray revealed loosening of multiple screws in her lumbar spine.  She was offered an SI injection to help with low back pain. 5 hours after the injection she suffered a heart attack in which a she received an ICD and stent. Today, she claims that she cannot feel her left leg. She attempts to walk on a treadmill for her cardiac rehab in which her left leg will give out 10-15 times during her hour long rehab. She reports that she is currently in physical therapy that is providing no relief. Describes pain as crushing in nature.  Located in low back.  + numbness . + burning. Denies fever or chills, no night sweats. Denies any bladder or bowel changes.      I have personally reviewed and/or updated the patient's past medical history, past surgical history, family history, social history, current medications, allergies, and vital signs today.     Review of Systems   Constitutional:  Positive for activity change.   HENT: Negative.     Eyes: Negative.    Respiratory: Negative.     Cardiovascular: Negative.    Gastrointestinal: Negative.    Endocrine: Negative.    Genitourinary: Negative.    Musculoskeletal:  Positive for arthralgias, back pain, gait problem and myalgias.   Skin: Negative.    Allergic/Immunologic: Negative.    Neurological:  Positive for weakness and numbness.   Hematological: Negative.    Psychiatric/Behavioral: Negative.     All other systems reviewed and are negative.      Patient Active Problem List   Diagnosis    Atrial flutter, paroxysmal (HCC)    Mixed hyperlipidemia    History of tobacco abuse    Ischemic cardiomyopathy    Chronic low back  pain    HFrEF (heart failure with reduced ejection fraction) (Formerly [facility])    S/P ICD (internal cardiac defibrillator) procedure    Back pain    Sciatica of left side    Syncope    History of sustained ventricular tachycardia    Coronary artery disease involving native coronary artery of native heart    S/P coronary artery stent placement    Type 2 diabetes mellitus, without long-term current use of insulin ([facility])    Acquired hypothyroidism    History of pulmonary embolism    History of gastric ulcer    Ganglion cyst of finger of left hand    Class 1 obesity due to excess calories with serious comorbidity and body mass index (BMI) of 34.0 to 34.9 in adult    POP (obstructive sleep apnea)    Localized edema    Primary osteoarthritis of right knee    History of torn meniscus of right knee    Chronic pain of right knee    Numbness and tingling in left arm    Chronic kidney disease, stage 4 (severe) ([facility])    Other pulmonary embolism without acute cor pulmonale, unspecified chronicity ([facility])    Acute on chronic systolic (congestive) heart failure ([facility]ly [facility])    Obesity, morbid ([facility])    Type 2 diabetes mellitus with stage 3 chronic kidney disease, without long-term current use of insulin, unspecified whether stage 3a or 3b CKD ([facility])    Other spondylosis with radiculopathy, lumbar region       Past Medical History:   Diagnosis Date    Acute respiratory insufficiency 03/22/2024    Allergic     Chronic HFrEF (heart failure with reduced ejection fraction) ([facility])     Coronary artery disease     COVID-19 virus infection 11/28/2022    Diabetes mellitus ([facility])     Disease of thyroid gland 4/09/2024    Heart disease 3/24    Hyperlipidemia     Hypoglycemia     ICD (implantable cardioverter-defibrillator) in place     Ischemic cardiomyopathy     Lactic acidosis 03/22/2024    Myocardial infarction ([facility]) 3/22/2024    Otitis media 1966    Seasonal allergies     ST elevation myocardial infarction involving left anterior descending (LAD) coronary artery  ([facility]) 2024    Tobacco abuse     Visual impairment 1967       Past Surgical History:   Procedure Laterality Date    ADENOIDECTOMY      APPENDECTOMY      APPENDECTOMY LAPAROSCOPIC N/A 2024    Procedure: APPENDECTOMY LAPAROSCOPIC;  Surgeon: Lauryn Ullrich, DO;  Location: [facility];  Service: General    BACK SURGERY      BREAST EXCISIONAL BIOPSY Right 2000    cyst removed- benign    CARDIAC CATHETERIZATION N/A 2024    Procedure: Cardiac pci;  Surgeon: Gabriel Myers MD;  [facility];  Service: Cardiology    CARDIAC CATHETERIZATION N/A 2024    Procedure: Cardiac Coronary Angiogram;  Surgeon: Gabriel Myers MD;  [facility];  Service: Cardiology    CARDIAC CATHETERIZATION N/A 2024    Procedure: Cardiac PCI AMI;  Surgeon: Gabriel Myers MD;  [facility];  Service: Cardiology    CARDIAC CATHETERIZATION N/A 2024    Procedure: Cardiac IVUS;  Surgeon: Gabriel Myers MD;  [facility];  Service: Cardiology    CARDIAC DEFIBRILLATOR PLACEMENT      CARDIAC ELECTROPHYSIOLOGY PROCEDURE N/A 2024    Procedure: Cardiac icd implant;  Surgeon: Jeffy Lama MD;  [facility];  Service: Cardiology     SECTION      COLONOSCOPY      ECTOPIC PREGNANCY SURGERY      INSERT / REPLACE / REMOVE PACEMAKER      MASS EXCISION Left 10/18/2024    Procedure: EXCISION BIOPSY TISSUE LESION/MASS UPPER EXTREMITY - Left index finger;  Surgeon: Leandro Campos MD;  Location: [facility];  Service: Orthopedics    SEPTOPLASTY      SPINE SURGERY  23    TONSILLECTOMY         Family History   Adopted: Yes   Problem Relation Age of Onset    Depression Mother     Heart disease Father     OCD Daughter        Social History     Occupational History    Not on file   Tobacco Use    Smoking status: Former     Current packs/day: 0.00     Average packs/day: 1 pack/day for 24.2 years (24.2 ttl pk-yrs)     Types: Cigarettes     Start  date: 2000     Quit date: 3/22/2024     Years since quittin.0     Passive exposure: Never    Smokeless tobacco: Never    Tobacco comments:     Smoked 0.5-1 ppd; quit in 2024.    Vaping Use    Vaping status: Never Used   Substance and Sexual Activity    Alcohol use: Never     Alcohol/week: 1.0 standard drink of alcohol     Types: 1 Shots of liquor per week     Comment: Every 2or 3months    Drug use: Never    Sexual activity: Not on file       Current Outpatient Medications on File Prior to Visit   Medication Sig    albuterol (ProAir HFA) 90 mcg/act inhaler Inhale 2 puffs every 6 (six) hours as needed for wheezing    apixaban (ELIQUIS) 5 mg Take 1 tablet (5 mg total) by mouth 2 (two) times a day    atorvastatin (LIPITOR) 80 mg tablet TAKE 1 TABLET BY MOUTH EVERY DAY IN THE EVENING    clopidogrel (PLAVIX) 75 mg tablet Take 1 tablet (75 mg total) by mouth daily    diphenhydrAMINE (BENADRYL) 25 mg capsule Take 25 mg by mouth every 6 (six) hours as needed for itching    dofetilide (TIKOSYN) 125 mcg capsule Take 1 capsule (125 mcg total) by mouth every 12 (twelve) hours    Durolane 60 MG/3ML injection     Empagliflozin (Jardiance) 25 MG TABS Take 1 tablet (25 mg total) by mouth every morning    ergocalciferol (VITAMIN D2) 50,000 units Take 1 capsule (50,000 Units total) by mouth once a week    fluticasone (FLONASE) 50 mcg/act nasal spray 1 spray into each nostril daily    furosemide (LASIX) 20 mg tablet PLEASE SEE ATTACHED FOR DETAILED DIRECTIONS    furosemide (LASIX) 40 mg tablet Take 1 tablet (40 mg total) by mouth if needed (if worse shortness of breath or weight up 3lbs)    glimepiride (AMARYL) 1 mg tablet Take 1 tablet (1 mg total) by mouth daily with breakfast    isosorbide mononitrate (IMDUR) 30 mg 24 hr tablet TAKE 1 TABLET (30 MG TOTAL) BY MOUTH 2 (TWO) TIMES A DAY 30 MG IN THE AM AND 30 MG IN THE PM    levothyroxine (Synthroid) 75 mcg tablet Take 1 tablet (75 mcg total) by mouth daily    meclizine  "(ANTIVERT) 25 mg tablet Take 1 tablet (25 mg total) by mouth every 8 (eight) hours as needed for dizziness    metoprolol succinate (TOPROL-XL) 25 mg 24 hr tablet Take 2 tablets (50 mg total) by mouth daily at bedtime    nitroglycerin (NITROSTAT) 0.4 mg SL tablet PLACE 1 TABLET UNDER THE TONGUE EVERY 5 MINUTES AS NEEDED FOR CHEST PAIN. (Patient taking differently: PRN)    pantoprazole (PROTONIX) 40 mg tablet TAKE 1 TABLET BY MOUTH 2 TIMES A DAY BEFORE MEALS.    potassium chloride (Klor-Con M20) 20 mEq tablet Take 1 tablet (20 mEq total) by mouth if needed (on lasix days)    sacubitril-valsartan (Entresto) 49-51 MG TABS Take 1 tablet by mouth 2 (two) times a day    semaglutide, 2 mg/dose, (Ozempic) 8 mg/ mL injection pen Inject 0.75 mL (2 mg total) under the skin every 7 days Do not start before March 17, 2025.    [DISCONTINUED] pregabalin (LYRICA) 75 mg capsule Take 1 capsule (75 mg total) by mouth 3 (three) times a day     No current facility-administered medications on file prior to visit.       Allergies   Allergen Reactions    Fish-Derived Products - Food Allergy Anaphylaxis     Cannot have all seafood products    Shellfish-Derived Products - Food Allergy Anaphylaxis    Azithromycin Hives and Rash         Physical Exam    Ht 5' 8\" (1.727 m)   Wt 99.8 kg (220 lb)   BMI 33.45 kg/m²     Constitutional: normal, well developed, well nourished, alert, in no distress and non-toxic and no overt pain behavior. and obese  Eyes: anicteric  HEENT: grossly intact  Neck: supple, symmetric, trachea midline and no masses   Pulmonary:even and unlabored  Cardiovascular:No edema or pitting edema present  Skin:Normal without rashes or lesions and well hydrated  Psychiatric:Mood and affect appropriate  Neurologic:Cranial Nerves II-XII grossly intact Sensation grossly intact; no clonus negative youngblood's. Reflexes 2+ and brisk. SLR negative bilaterally.   Musculoskeletal:normal gait. 3-4/5 strength with left hip flexion, otherwise " 5/5 strength bilaterally with AROM in lower extremities. Normal heel toe and tip toe walking. Significant pain with lumbar facet loading bilaterally and with lateral spine rotation, ttp over lumbar paraspinal muscles. Negative pia's test, negative gaenslen's negative SIJ loading bilaterally.    Imaging    MRI LUMBAR SPINE WITHOUT CONTRAST     INDICATION: S32.009K: Unspecified fracture of unspecified lumbar vertebra, subsequent encounter for fracture with nonunion  M54.9: Dorsalgia, unspecified  M54.16: Radiculopathy, lumbar region.     COMPARISON: Lumbar spine radiographs from 1/14/2025, CT abdomen pelvis from 5/7/2024     TECHNIQUE:  Multiplanar, multisequence imaging of the lumbar spine was performed. .        IMAGE QUALITY:  Diagnostic     FINDINGS:     VERTEBRAL BODIES:  There are 5 lumbar type vertebral bodies. Grade 1 anterolisthesis of L4 on L5 again demonstrated. Postsurgical changes from prior posterior lumbar spinal fusion procedures at L3-S1, with bilateral transpedicular screws, vertical rods.   Laminectomies at L3-S1 again demonstrated. No compression fractures identified. Normal marrow signal is identified within the visualized bony structures.  No discrete marrow lesion.     SACRUM:  Normal signal within the sacrum. No evidence of insufficiency or stress fracture.     DISTAL CORD AND CONUS:  Normal size and signal within the distal cord and conus. Conus terminates at T12-L1.     PARASPINAL SOFT TISSUES:  Paraspinal soft tissues are unremarkable.     LOWER THORACIC DISC SPACES:  Normal disc height and signal.  No disc herniation, canal stenosis or foraminal narrowing.     LUMBAR DISC SPACES:     L1-L2:  Normal.     L2-L3: Mild bilateral facet arthropathy. Otherwise normal.     L3-L4: Posterior fusion at this level, with laminectomies. Disc bulge with likely superimposed left central and subarticular disc protrusion. No canal stenosis. Mild left subarticular zone narrowing. Mild bilateral foraminal  stenosis.     L4-L5: Anterolisthesis of L4 on L5. This level is fused posteriorly with laminectomies. Mild disc bulge. No canal stenosis. No foraminal stenosis identified.     L5-S1: This level is fused posteriorly with laminectomies. Disc height loss, disc osteophyte complex. Bilateral facet arthropathy. No canal stenosis. No foraminal stenosis identified.     OTHER FINDINGS: Lower pole left renal cyst for which no further imaging evaluation or follow-up is needed.     IMPRESSION:     Prior posterior lumbar spinal fusion procedure from L3-S1, with laminectomies at these levels.     Multilevel lumbar spondylosis, as described above, without canal stenosis, but there is mild left subarticular zone narrowing and mild bilateral foraminal stenosis at L3-L4 due to a left-sided disc protrusion at this level.

## 2025-03-26 ENCOUNTER — REMOTE DEVICE CLINIC VISIT (OUTPATIENT)
Dept: CARDIOLOGY CLINIC | Facility: CLINIC | Age: 65
End: 2025-03-26

## 2025-03-26 ENCOUNTER — RESULTS FOLLOW-UP (OUTPATIENT)
Dept: NON INVASIVE DIAGNOSTICS | Facility: HOSPITAL | Age: 65
End: 2025-03-26

## 2025-03-26 DIAGNOSIS — Z95.810 AICD (AUTOMATIC CARDIOVERTER/DEFIBRILLATOR) PRESENT: Primary | ICD-10-CM

## 2025-03-26 PROCEDURE — RECHECK: Performed by: INTERNAL MEDICINE

## 2025-03-26 NOTE — PROGRESS NOTES
Results for orders placed or performed in visit on 03/26/25   Cardiac EP device report    Narrative    MDT DUAL ICD (MVP ON) / ACTIVE SYSTEM IS MRI CONDITIONAL  1 MONTH [employer] TRANSMISSION TO CHECK EPISODES PER . RADHA RAMIREZ: BATTERY VOLTAGE ADEQUATE (11.8 YRS). AP-25%, <0.1%. ALL AVAILABLE LEAD PARAMETERS WITHIN NORMAL LIMITS. NO SIGNIFICANT HIGH RATE EPISODES. OPTI-VOL WITHIN NORMAL LIMITS. NORMAL DEVICE FUNCTION. GV

## 2025-03-27 ENCOUNTER — OFFICE VISIT (OUTPATIENT)
Dept: OBGYN CLINIC | Facility: CLINIC | Age: 65
End: 2025-03-27
Payer: COMMERCIAL

## 2025-03-27 ENCOUNTER — HOSPITAL ENCOUNTER (OUTPATIENT)
Dept: RADIOLOGY | Facility: CLINIC | Age: 65
End: 2025-03-27
Payer: COMMERCIAL

## 2025-03-27 VITALS — BODY MASS INDEX: 33.34 KG/M2 | WEIGHT: 220 LBS | HEIGHT: 68 IN

## 2025-03-27 DIAGNOSIS — M79.641 RIGHT HAND PAIN: ICD-10-CM

## 2025-03-27 DIAGNOSIS — M65.322 TRIGGER FINGER, LEFT INDEX FINGER: ICD-10-CM

## 2025-03-27 DIAGNOSIS — M79.642 LEFT HAND PAIN: ICD-10-CM

## 2025-03-27 DIAGNOSIS — M65.321 TRIGGER FINGER, RIGHT INDEX FINGER: Primary | ICD-10-CM

## 2025-03-27 PROCEDURE — 73130 X-RAY EXAM OF HAND: CPT

## 2025-03-27 PROCEDURE — 99213 OFFICE O/P EST LOW 20 MIN: CPT | Performed by: STUDENT IN AN ORGANIZED HEALTH CARE EDUCATION/TRAINING PROGRAM

## 2025-03-27 NOTE — PROGRESS NOTES
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  An excisional biopsy was performed on 10/18/2024.  The patient did very well with this and had a full recovery.  She reports no issues with this finger and is very happy with the care related to this.    She presented today for evaluation of bilateral trigger fingers of the index fingers.  He had significant locking on examination and the patient reports this is greatly limited her function. I discussed the natural course and treatment options of trigger finger with the patient.  We discussed that the etiology is thickening of the tendon sheath surrounding the flexor tendons in the distal palm and that common symptoms are pain, catching, clicking, popping, and locking of the digit.  We also discussed that there are surgical and nonsurgical means to treat this.  Activity modification can be somewhat helpful, though corticosteroid injections are often the first-line treatment for this condition.  The published efficacy of the 1st injection for trigger finger is about 45-70% at achieving full remission.  Unfortunately the patient reports that she is not allowed to have corticosteroid injections.  She has multiple medical issues at this time and has been recommended that even light doses of corticosteroid should be avoided.  As such she is requesting surgical release.  In her current situation she is planned to have an extensive back surgery in the next month.  I do not want the trigger finger releases to interfere with this.  We had a discussion on planning and recovery and the patient would like to wait until she has recovered from her back surgery which I think is very appropriate.  She will reach out when she has recovered from her spinal surgery and  we can accommodate her for the surgery.  We did have a discussion on the risks and benefits of the surgery.  We discussed how the surgery would be performed as well as the complications associated with the surgery.      After a thorough discussion of risks, benefits, and alternatives, patient is indicated for non-operative treatment.  Reviewed previous A1C.  Cortisone injection was declined due to planned Lumbar Fusion with TLIF  Activity as tolerated  OTC NSAIDs for pain relief and for inflammation  Follow up if symptoms have not completely resolved.  If the symptoms persist, patient may benefit form surgical intervention.   Patient/Guardian agrees and consents to the treatment plan.  All questions answered.       _____________________________________________________  CHIEF COMPLAINT:  Bilateral Index Trigger Finger      SUBJECTIVE:  Vesna Langston is a 64 y.o. right handed  female who presents today for evaluation of her bilateral index finger. She reports that they have had locking and catching with pain about the above digit(s) for several months. Significantly affecting ADLs. Denies numbness/tingling.     The patient previously had a left index finger mass excised in the fall 2024 and has made a full recovery from this.  She is very happy with how the surgery proceeded.    Location of the pain: A1 Pulley of index finger bilaterally.  Quality of  pain: sharp  Severity of the pain: moderate  Aggravating symptoms: activity  Dropping objects: no  Increase in symptoms with increase use : yes    Treatments to date include: Nothing specific    Occupation: Crafts      PAST MEDICAL HISTORY:  Past Medical History:   Diagnosis Date    Acute respiratory insufficiency 03/22/2024    Allergic     Back pain 2022    Bradycardia 3/22/24    Breast cyst 2000    Chronic HFrEF (heart failure with reduced ejection fraction) (HCC)     Clotting disorder (HCC) 4/1/24    Coronary artery disease     COVID-19 virus infection 11/28/2022     Diabetes mellitus (HCC)     Disease of thyroid gland 4/09/2024    Environmental and seasonal allergies 1/1/1960    GERD (gastroesophageal reflux disease) 10/09    GI (gastrointestinal bleed) 4/1/24    Heart disease 3/24    History of placement of internal cardiac defibrillator 3/27/24    Hyperlipidemia     Hypoglycemia     ICD (implantable cardioverter-defibrillator) in place     Ischemic cardiomyopathy     Lactic acidosis 03/22/2024    Migraine 1980    Morning headache 1980    Myocardial infarction ([facility]) 3/22/2024    Otitis media 1966    Pacemaker 3/27/24    Peptic ulceration 4/2024    Seasonal allergies     Sinus problem 1970    ST elevation myocardial infarction involving left anterior descending (LAD) coronary artery ([facility]) 03/22/2024    Tobacco abuse     Visual impairment 1967       PAST SURGICAL HISTORY:  Past Surgical History:   Procedure Laterality Date    ADENOIDECTOMY      APPENDECTOMY      APPENDECTOMY LAPAROSCOPIC N/A 05/08/2024    Procedure: APPENDECTOMY LAPAROSCOPIC;  Surgeon: Lauryn Ullrich, DO;  Location: [facility];  Service: General    BACK SURGERY  8/23    BREAST EXCISIONAL BIOPSY Right 09/2000    cyst removed- benign    CARDIAC CATHETERIZATION N/A 03/22/2024    Procedure: Cardiac pci;  Surgeon: Gabriel Myers MD;  Location: [facility];  Service: Cardiology    CARDIAC CATHETERIZATION N/A 03/22/2024    Procedure: Cardiac Coronary Angiogram;  Surgeon: Gabriel Myers MD;  [facility];  Service: Cardiology    CARDIAC CATHETERIZATION N/A 03/22/2024    Procedure: Cardiac PCI AMI;  Surgeon: Gabriel Myers MD;  [facility];  Service: Cardiology    CARDIAC CATHETERIZATION N/A 03/22/2024    Procedure: Cardiac IVUS;  Surgeon: Gabriel Myers MD;  [facility];  Service: Cardiology    CARDIAC DEFIBRILLATOR PLACEMENT      CARDIAC ELECTROPHYSIOLOGY PROCEDURE N/A 03/27/2024    Procedure: Cardiac icd implant;  Surgeon: Jeffy Lama MD;  [facility];  Service: Cardiology     SECTION      COLONOSCOPY      ECTOPIC PREGNANCY SURGERY      INSERT / REPLACE / REMOVE PACEMAKER      MASS EXCISION Left 10/18/2024    Procedure: EXCISION BIOPSY TISSUE LESION/MASS UPPER EXTREMITY - Left index finger;  Surgeon: Leandro Campos MD;  Location: [facility];  Service: Orthopedics    SEPTOPLASTY      SPINE SURGERY  23    TONSILLECTOMY      TONSILLECTOMY AND ADENOIDECTOMY  1964    TRIGGER POINT INJECTION      UPPER GASTROINTESTINAL ENDOSCOPY  24       FAMILY HISTORY:  Family History   Adopted: Yes   Problem Relation Age of Onset    Depression Mother     Sleep apnea Mother     Heart disease Father     Snoring Father     Restless legs syndrome Father     OCD Daughter        SOCIAL HISTORY:  Social History     Tobacco Use    Smoking status: Former     Current packs/day: 0.00     Average packs/day: 1 pack/day for 24.2 years (24.2 ttl pk-yrs)     Types: Cigarettes     Start date: 2000     Quit date: 3/22/2024     Years since quittin.0     Passive exposure: Never    Smokeless tobacco: Never    Tobacco comments:     Smoked 0.5-1 ppd; quit in 2024.    Vaping Use    Vaping status: Never Used   Substance Use Topics    Alcohol use: Not Currently     Alcohol/week: 1.0 standard drink of alcohol     Types: 1 Shots of liquor per week     Comment: Every 2or 3months    Drug use: Never       MEDICATIONS:    Current Outpatient Medications:     albuterol (ProAir HFA) 90 mcg/act inhaler, Inhale 2 puffs every 6 (six) hours as needed for wheezing, Disp: 8.5 g, Rfl: 0    apixaban (ELIQUIS) 5 mg, Take 1 tablet (5 mg total) by mouth 2 (two) times a day, Disp: 180 tablet, Rfl: 1    atorvastatin (LIPITOR) 80 mg tablet, TAKE 1 TABLET BY MOUTH EVERY DAY IN THE EVENING, Disp: 90 tablet, Rfl: 1    diphenhydrAMINE (BENADRYL) 25 mg capsule, Take 25 mg by mouth every 6 (six) hours as needed for itching, Disp: , Rfl:     dofetilide (TIKOSYN) 125 mcg capsule, Take 1 capsule (125 mcg  total) by mouth every 12 (twelve) hours, Disp: 180 capsule, Rfl: 3    Durolane 60 MG/3ML injection, , Disp: , Rfl:     Empagliflozin (Jardiance) 25 MG TABS, Take 1 tablet (25 mg total) by mouth every morning, Disp: 30 tablet, Rfl: 17    ergocalciferol (VITAMIN D2) 50,000 units, Take 1 capsule (50,000 Units total) by mouth once a week, Disp: 12 capsule, Rfl: 1    fluticasone (FLONASE) 50 mcg/act nasal spray, 1 spray into each nostril daily, Disp: 9.9 mL, Rfl: 0    furosemide (LASIX) 20 mg tablet, PLEASE SEE ATTACHED FOR DETAILED DIRECTIONS, Disp: , Rfl:     furosemide (LASIX) 40 mg tablet, Take 1 tablet (40 mg total) by mouth if needed (if worse shortness of breath or weight up 3lbs), Disp: 60 tablet, Rfl: 17    glimepiride (AMARYL) 1 mg tablet, Take 1 tablet (1 mg total) by mouth daily with breakfast, Disp: 90 tablet, Rfl: 3    isosorbide mononitrate (IMDUR) 30 mg 24 hr tablet, TAKE 1 TABLET (30 MG TOTAL) BY MOUTH 2 (TWO) TIMES A DAY 30 MG IN THE AM AND 30 MG IN THE PM, Disp: 180 tablet, Rfl: 1    levothyroxine (Synthroid) 75 mcg tablet, Take 1 tablet (75 mcg total) by mouth daily, Disp: 90 tablet, Rfl: 3    meclizine (ANTIVERT) 25 mg tablet, Take 1 tablet (25 mg total) by mouth every 8 (eight) hours as needed for dizziness, Disp: 30 tablet, Rfl: 0    metoprolol succinate (TOPROL-XL) 25 mg 24 hr tablet, Take 2 tablets (50 mg total) by mouth daily at bedtime, Disp: 180 tablet, Rfl: 5    nitroglycerin (NITROSTAT) 0.4 mg SL tablet, PLACE 1 TABLET UNDER THE TONGUE EVERY 5 MINUTES AS NEEDED FOR CHEST PAIN. (Patient taking differently: 0.4 mg as needed for chest pain), Disp: 25 tablet, Rfl: 1    pantoprazole (PROTONIX) 40 mg tablet, TAKE 1 TABLET BY MOUTH 2 TIMES A DAY BEFORE MEALS., Disp: 180 tablet, Rfl: 1    potassium chloride (Klor-Con M20) 20 mEq tablet, Take 1 tablet (20 mEq total) by mouth if needed (on lasix days), Disp: 90 tablet, Rfl: 1    pregabalin (LYRICA) 100 mg capsule, Take 1 capsule (100 mg total) by  "mouth 3 (three) times a day, Disp: 90 capsule, Rfl: 2    sacubitril-valsartan (Entresto) 49-51 MG TABS, Take 1 tablet by mouth 2 (two) times a day, Disp: 60 tablet, Rfl: 5    semaglutide, 2 mg/dose, (Ozempic) 8 mg/ mL injection pen, Inject 0.75 mL (2 mg total) under the skin every 7 days Do not start before March 17, 2025., Disp: 9 mL, Rfl: 1    clopidogrel (PLAVIX) 75 mg tablet, Take 1 tablet (75 mg total) by mouth daily, Disp: 90 tablet, Rfl: 3    ALLERGIES:  Allergies   Allergen Reactions    Fish-Derived Products - Food Allergy Anaphylaxis     Cannot have all seafood products    Shellfish-Derived Products - Food Allergy Anaphylaxis    Azithromycin Hives and Rash       REVIEW OF SYSTEMS:  Pertinent items are noted in HPI.  A comprehensive review of systems was negative.    LABS:  HgA1c:   Lab Results   Component Value Date    HGBA1C 7.1 (H) 12/27/2024     BMP:   Lab Results   Component Value Date    GLUCOSE 168 (H) 04/07/2024    CALCIUM 9.7 01/14/2025    K 4.1 01/14/2025    CO2 27 01/14/2025     01/14/2025    BUN 17 01/14/2025    CREATININE 0.97 01/14/2025         _____________________________________________________  PHYSICAL EXAMINATION:  Vital signs: Ht 5' 8\" (1.727 m)   Wt 99.8 kg (220 lb)   BMI 33.45 kg/m²   General: well developed and well nourished, alert, oriented times 3, and appears comfortable  Psychiatric: Normal  HEENT: Trachea Midline, No torticollis  Cardiovascular: No discernable arrhythmia  Pulmonary: No wheezing or stridor  Abdomen: No rebound or guarding  Extremities: No peripheral edema  Skin: No masses, erythema, lacerations, fluctation, ulcerations  Neurovascular: Sensation Intact to the Median, Ulnar, Radial Nerve, Motor Intact to the Median, Ulnar, Radial Nerve, and Pulses Intact    MUSCULOSKELETAL EXAMINATION:  Bilateral index finger   Skin intact  Range of motion of the digit: within normal digits  Other digits' motion is within normal digits     FDS/FDP/FPL/EPL/finger extensors " intact   + TTP about A1 pulley  + Triggering   + Locking  negative Basal joint grind test bilaterally    Neurovascular:   Sensation intact to light touch about radial and ulnar border of digit  - CT compression test   - Tinel at CT  - Phalen   There is no atrophy of the thenar muscles.  WWP    _____________________________________________________  STUDIES REVIEWED:  I reviewed imaging in PACS from 3/27/2025 of the left and right hand which demonstrates mild degenerative changes throughout the interphalangeal and carpal metacarpal joints but no evidence of fracture or dislocation      PROCEDURES PERFORMED:  Procedures None performed at time of visit        Scribe Attestation      I,:  Tamika Hunter am acting as a scribe while in the presence of the attending physician.:       I,:  Leandro Campos MD personally performed the services described in this documentation    as scribed in my presence.:

## 2025-03-28 ENCOUNTER — TELEPHONE (OUTPATIENT)
Age: 65
End: 2025-03-28

## 2025-03-28 ENCOUNTER — APPOINTMENT (OUTPATIENT)
Dept: LAB | Facility: HOSPITAL | Age: 65
End: 2025-03-28
Payer: COMMERCIAL

## 2025-03-28 ENCOUNTER — RESULTS FOLLOW-UP (OUTPATIENT)
Dept: FAMILY MEDICINE CLINIC | Facility: CLINIC | Age: 65
End: 2025-03-28

## 2025-03-28 DIAGNOSIS — E55.9 VITAMIN D DEFICIENCY: ICD-10-CM

## 2025-03-28 DIAGNOSIS — I25.10 CORONARY ARTERY DISEASE INVOLVING NATIVE CORONARY ARTERY OF NATIVE HEART WITHOUT ANGINA PECTORIS: Chronic | ICD-10-CM

## 2025-03-28 DIAGNOSIS — E03.9 ACQUIRED HYPOTHYROIDISM: ICD-10-CM

## 2025-03-28 DIAGNOSIS — I25.5 ISCHEMIC CARDIOMYOPATHY: Primary | Chronic | ICD-10-CM

## 2025-03-28 DIAGNOSIS — E78.2 MIXED HYPERLIPIDEMIA: ICD-10-CM

## 2025-03-28 DIAGNOSIS — E11.9 TYPE 2 DIABETES MELLITUS WITHOUT COMPLICATION, WITHOUT LONG-TERM CURRENT USE OF INSULIN (HCC): ICD-10-CM

## 2025-03-28 LAB
25(OH)D3 SERPL-MCNC: 34.6 NG/ML (ref 30–100)
CHOLEST SERPL-MCNC: 126 MG/DL (ref ?–200)
EST. AVERAGE GLUCOSE BLD GHB EST-MCNC: 123 MG/DL
HBA1C MFR BLD: 5.9 %
HDLC SERPL-MCNC: 39 MG/DL
LDLC SERPL CALC-MCNC: 68 MG/DL (ref 0–100)
NONHDLC SERPL-MCNC: 87 MG/DL
TRIGL SERPL-MCNC: 96 MG/DL (ref ?–150)
TSH SERPL DL<=0.05 MIU/L-ACNC: 3.06 UIU/ML (ref 0.45–4.5)

## 2025-03-28 PROCEDURE — 84443 ASSAY THYROID STIM HORMONE: CPT

## 2025-03-28 PROCEDURE — 36415 COLL VENOUS BLD VENIPUNCTURE: CPT

## 2025-03-28 PROCEDURE — 80061 LIPID PANEL: CPT

## 2025-03-28 PROCEDURE — 82306 VITAMIN D 25 HYDROXY: CPT

## 2025-03-28 PROCEDURE — 83036 HEMOGLOBIN GLYCOSYLATED A1C: CPT

## 2025-03-28 RX ORDER — FUROSEMIDE 20 MG/1
TABLET ORAL
Qty: 190 TABLET | Refills: 1 | Status: SHIPPED | OUTPATIENT
Start: 2025-03-28

## 2025-03-28 NOTE — TELEPHONE ENCOUNTER
"Attempted to reach Vesna to relay the message per Dr. Montoya \"Please let patient know her A1C looks great! 7.1 down to 5.9. Cholesterol levels, vitamin D and Thyroid also all great and within normal ranges! Please let me know if she has any questions or concerns.\" Patient didn't answer/ left a VM.  "

## 2025-03-28 NOTE — TELEPHONE ENCOUNTER
Patient calls regarding her A1C. Patient reports that she has been really following the diet and also using the ozempic 2 mg weekly. Patient reports no hypoglycemic episodes. Patient is currently awaiting surgery for a limbar fusion. Patient will seen on 4/9/25 for her pre-op visit. Please review all the results and then discuss with the patient.

## 2025-04-01 DIAGNOSIS — I25.5 ISCHEMIC CARDIOMYOPATHY: Chronic | ICD-10-CM

## 2025-04-02 RX ORDER — FUROSEMIDE 40 MG/1
40 TABLET ORAL AS NEEDED
Qty: 60 TABLET | Refills: 5 | Status: SHIPPED | OUTPATIENT
Start: 2025-04-02 | End: 2026-09-24

## 2025-04-02 NOTE — PROGRESS NOTES
"Advanced Heart Failure/Pulmonary Hypertension Outpatient Note - Vesna Langston 65 y.o. female MRN: [id number]    @ Encounter: [id number]    Assessment:  65 y.o. female PMH and acute problems listed later in this note (a partial list may also be included within 'assessment' section) p/w HF fu.  I first met Vesna Langston on 5/6/24.    Primarily ICM, HFimpEF, LVEF 30-35%>PCI+gdmt>40-45% 10/2024 TTE, nondilated LV  LHC 03/22/2024: s/p PCI to 100% stenosis of ostial/proximal LAD. No other residual dz elsewhere.  cMRI 03/26/2024: LVEF 20%. LVIDd 4.5 cm. \"Transmural scarring of the mid anterior, anteroseptal and inferoseptal walls, the apical anterior, septal and inferior walls and the apex.\"   Coronary artery disease  S/p MI in 03/2024; received PCI to 100% stenosis of ostial/proximal LAD. No residual dz elsewhere.  History of monomorphic ventricular tachycardia              Per EP notes, felt to be scar mediated.               S/p secondary prevention ICD.  Atrial flutter, paroxysmal               Anticoagulation on Eliquis.   was on amiodarone per EP.  transitioned to dofetilide 125mcg twice daily 11/2024 during elective tikosyn admit.  PE 5/2024. CTA: Single subsegmental right lower lobe pulmonary embolus as shown on abdominal CT.   Hyperlipidemia  Diabetes mellitus, type II  Chronic back pain, multiple past surgeries, follows surgical team in [facility] [place]. Dr. Pena  History of tobacco abuse  4/7/24 CT: IMPRESSION:  Suspected small hemorrhage from the anterior wall of the distal gastric body with focal wall thickening. Bleeding due to underlying lesion or PUD is suspected. Recommend endoscopic correlation.  Past heavy NSAID use, 2024 stopped  dieting in [id number] - was having only 1000 calories a day she says, increased somewhat in 2025 to 1500 rachael/day, have discussed nutrition cx with her in past.  Lung and spleen granulomas.  Works in [place] in past  Prior smoker, quit [id number]      I have reviewed all " "pertinent patient data including but not limited to:        Lab Units 01/14/25  0642 12/27/24  0638 12/21/24 1948   CREATININE mg/dL 0.97 1.05 1.12             Lab Results   Component Value Date    K 4.1 01/14/2025     Lab Results   Component Value Date    HGBA1C 5.9 (H) 03/28/2025     Lab Results   Component Value Date    HAY1CBHPKCOH 3.061 03/28/2025     Lab Results   Component Value Date    LDLCALC 68 03/28/2025     Lab Results   Component Value Date    BNP 78 11/30/2024      No results found for: \"NTBNP\"       Home scale dry weight 220lbs  When decompensated she has been 227lbs    TODAY'S PLAN:     04/04/25  Warm, euvolemic  No new cardiac complaints, feels generally well aside form her chronic back pain  No sob with her current workouts, good energy level  Asymptomatic hypotension with BP spikes at home when in pain she says    Cardiac pre op evaluation:  - the patient is moderate risk for non-cardiac surgery  - the patient likely does not routinely challenge themself to complete 4 mets exertion due to back pain  - no evidence of acute cardiac issue including decompensated HF, uncontrolled arrhythmia, severe symptomatic valvulopathy, or an ACS  - the patient is currently optimized from cardiac perspective  - no further cardiac testing planned at this time  - continue current home medications with any suzanna-op modifications per anesthesia/surgery teams, in addition to my below recommendations  - continue bblocker uninterrupted  - continue tikosyn  - AC plan: can hold eliquis for 2 days pre op  - anti-platelet plan: while not ideal, can hold plavix 5-7 days pre op per surgeon discretion    We have discussed some of her risks with major back surgery. She understands there is some level of risk including recurrent MI and even death that we cannot entirely mitigate.    Has upcoming ecg already scheduled she says    No Rx changes today    Cw imdur 30 bid  Cw prn nitro SL  Further antianginals as indicated, and better " BP control (note she has also had hypotension and dizziness in past causing cessation of certain elements of gdmt)    Gdmt below  Limited by hypotension in past, her regimen has been interrupted intermittently due to hypotension in 2024  No changes today  No MR    On AC+plavix; ideally no pause until at least 3/2025 for any non-urgent surgical reason  On high intens statin    On tikosyn per EP    Follow up:  With me in 3 mo, or sooner if symptoms evolve  In addition to follow up with their other medical providers    Key info from my prior notes:    Follows with sleep medicine, planned on Tx for POP, has not started yet per prior notes - advised begin planned Tx as soon as able on multiple occasions     She usually takes lasix 40 bid with self titration for weight gain and SOB. She is completing 4 day burst of lasix 80 bid for 7 lbs weight gain over 5 days (no diet indiscretion). Then she will return to 40 bid.  She requests to reattempt jardiance now, which will also have some diuretic effect. She felt well on this drug in past.  Extensive discussions about risks vs benefits as previously documented. She has had UTI in past causing sglt2i cessation, she recognizes risks and wishes to reattempt 1/2025.    Strict RTC precautions given  Fu in 2 weeks  She will call in 4-5 days with symptom check in  Low threshold repeat echo  Doubt new PE but monitor closely    Home weight up 217 > 220lbs today, feels mild leg swelling, bloating; has been much more SOB x 3 days which began suddenly - previously felt very well for first few days after recent discharge (had elective tiksoyn initiation admit). Has ongoing unremitting LUE numbness from inner biceps area to hand (not more proximally) - ongoing x days after moved boxes with [employer] in attic during weekend, up to 40lbs.  No new chest pain  No syncope  Worsening hypotension  Minimal salt intake, drinks about 50-55 oz water daily she says  She was treated for yeast infx x  3days per PCP, she says her symptoms entirely resolved, no new pain, no fevers.   She does note she is recovering from sinusitis.    Messaged EP team today regarding QTc trend  I cannot see their ECG from today in muse or epic media tab, priors borderline  Fu EP for tikosyn plan    Will need to back off gdmt at least for now 2/2 hypotension and her Sx, reductions as below    Now seeing nutritionist, no longer doing extreme caloric restriction to 800 cals per day, as she was in recent past  Hydrating better    She has been consuming only 800 calories a day for 1 month or longer in an extreme attempt to lose weight > advised against this. Nutrition referral given today.    Trial imdur for any component of angina  Then 1 week later up entresto and fu BMP afterwards; this will also help with volume management  Cw lasix 20 qd standing for now, which is recent increase from prior    Warm, euvolemic  Has increased lasix 20 prn to qdaily standing for past 1 week for increased weight, mild sob, mild edema of feet - no major improvement.  Today is last minute urgent visit for ED visit 8/25 for CP in setting of home  transiently, ACS ruled out. Then 8/26 yesterday had episode of vomiting, faintness, no LOC, no chest pain, has felt better since then. No ICD shocks, no palpitations. Did not improve with meclizine. Normal-higher blood sugars at home recently/during events.  Very rare opioid use she says for her back pain issues, none since 1-2 weeks ago    Follows GI    Discussed therapeutic lifestyle changes, low-moderate intensity exercise, maintain acceptable hydration 64 oz water daily, salt restrict, Mediterranean vs DASH diet, weight loss  Avoid nsaids    Further review of return to work next week  Safest plan would be no driving x 3 mo - resume 6/2024 if remains stable    Pharmacotherapies / Neurohormonal Blockade:  --Beta Blocker: metoprolol succinate 50 qd  --ARNi / ACEi / ARB: entresto 49/51 bid > pause 12/2/24  for hypotension (near term resumption planned)>resume entresto 49/51 bid 12/13/24 (low threshold reduce dose)  --Aldosterone Antagonist: aldactone 25 qd > paused 12/2/24 for hypotension, consider resumption future  --SGLT2 Inhibitor: jardiance 25 qd for HF and DM > subsequently Sglt2i stopped in early 2024 2/2 yeast infx>9/10/24 Re-attempt lower sglt2i dose now, jardiance 10 qd, long discussion risks vs benefits and this is pt wish> DC 11/2024 2/2 recurrent yeast infx (may consider repeat re-attempt future, pt indicates desire for this 12/2/24) > plan above, pt requests reattempt 1/3/25 and we resumed 25 qd    --Diuretic: lasix 40 bid, potassium 20 qd > she has moved to as needed for both as of 2025  Long discussion about evidence of worsening HF, when to self uptitrate home diuretic and call cardiology office     Sudden Cardiac Death Risk Reduction:  --Medtronic dual chamber ICD in situ since 03/2024 (secondary prevention).   3/2025  MDT DUAL ICD (MVP ON) / ACTIVE SYSTEM IS MRI CONDITIONAL   1 MONTH CARELINK TRANSMISSION TO CHECK EPISODES PER  SN- NB: BATTERY VOLTAGE ADEQUATE (11.8 YRS). AP-25%, <0.1%. ALL AVAILABLE LEAD PARAMETERS WITHIN NORMAL LIMITS. NO SIGNIFICANT HIGH RATE EPISODES. OPTI-VOL WITHIN NORMAL LIMITS. NORMAL DEVICE FUNCTION.   Electrical Resynchronization:  --Candidacy for BiV device: narrow QRS.   Advanced Therapies: Will continue to monitor.    Studies:  I have reviewed all pertinent patient data/labs/imaging where available, including but not limited to the below studies. Selected results may be displayed here but comprehensive listing is omitted for note clarity and can be found in the epic chart.    ECG.    Echo.    Stress.    Cath.    HPI:   64 y.o. female PMH and acute problems listed later in this note (a partial list may also be included within 'assessment' section) p/w HF fu.  I first met Vesna Langston on 5/6/24.  No new CP/SOB/dizziness/palpitations/syncope.  No new fatigue.  No new  unintentional weight changes.  No new leg swelling, PND, pillow orthopnea.  No new fevers, chills, cough, nausea, vomiting, diarrhea, dysuria.      Interval History:  As noted in 'plan' section above and prior epic chart notes.    No new dizziness/palpitations/syncope.  No new fatigue.  No new PND, pillow orthopnea.  No new fevers, chills, cough, nausea, vomiting, diarrhea, dysuria.    Past Medical History:   Diagnosis Date    Acute respiratory insufficiency 03/22/2024    Allergic     Back pain 2022    Bradycardia 3/22/24    Breast cyst 2000    Chronic HFrEF (heart failure with reduced ejection fraction) ([facility])     Clotting disorder ([facility]) 4/1/24    Coronary artery disease     COVID-19 virus infection 11/28/2022    Diabetes mellitus ([facility])     Disease of thyroid gland 4/09/2024    Environmental and seasonal allergies 1/1/1960    GERD (gastroesophageal reflux disease) 10/09    GI (gastrointestinal bleed) 4/1/24    Heart disease 3/24    History of placement of internal cardiac defibrillator 3/27/24    Hyperlipidemia     Hypoglycemia     ICD (implantable cardioverter-defibrillator) in place     Ischemic cardiomyopathy     Lactic acidosis 03/22/2024    Migraine 1980    Morning headache 1980    Myocardial infarction ([facility]) 3/22/2024    Otitis media 1966    Pacemaker 3/27/24    Peptic ulceration 4/2024    Seasonal allergies     Sinus problem 1970    ST elevation myocardial infarction involving left anterior descending (LAD) coronary artery ([facility]) 03/22/2024    Tobacco abuse     Visual impairment 1967     Patient Active Problem List    Diagnosis Date Noted    Other spondylosis with radiculopathy, lumbar region 03/10/2025    Chronic kidney disease, stage 4 (severe) ([facility]) 01/03/2025    Other pulmonary embolism without acute cor pulmonale, unspecified chronicity ([facility]) 01/03/2025    Acute on chronic systolic (congestive) heart failure ([facility]) 01/03/2025    Obesity, morbid ([facility]) 01/03/2025    Type 2 diabetes mellitus with stage 3  chronic kidney disease, without long-term current use of insulin, unspecified whether stage 3a or 3b CKD ([facility]) 01/03/2025    Numbness and tingling in left arm 11/30/2024    Primary osteoarthritis of right knee 10/08/2024    History of torn meniscus of right knee 10/08/2024    Chronic pain of right knee 10/08/2024    Localized edema 10/02/2024    POP (obstructive sleep apnea) 09/23/2024    Class 1 obesity due to excess calories with serious comorbidity and body mass index (BMI) of 34.0 to 34.9 in adult 08/27/2024    Ganglion cyst of finger of left hand 08/15/2024    History of pulmonary embolism 05/07/2024    History of gastric ulcer 05/07/2024    Acquired hypothyroidism 04/19/2024    Type 2 diabetes mellitus, without long-term current use of insulin ([facility]) 04/07/2024    Syncope 04/01/2024    S/P ICD (internal cardiac defibrillator) procedure 03/27/2024    Chronic low back pain 03/25/2024    HFrEF (heart failure with reduced ejection fraction) ([facility]) 03/25/2024    Ischemic cardiomyopathy 03/24/2024    Coronary artery disease involving native coronary artery of native heart 03/23/2024    S/P coronary artery stent placement 03/23/2024    Atrial flutter, paroxysmal ([facility]) 03/22/2024    Mixed hyperlipidemia 03/22/2024    History of tobacco abuse 03/22/2024    History of sustained ventricular tachycardia 03/22/2024    Back pain 07/31/2022    Sciatica of left side 07/31/2022       ROS:  10 point ROS negative except as specified in HPI/interval history    Allergies   Allergen Reactions    Fish-Derived Products - Food Allergy Anaphylaxis     Cannot have all seafood products    Shellfish-Derived Products - Food Allergy Anaphylaxis    Azithromycin Hives and Rash     Current Outpatient Medications   Medication Instructions    albuterol (ProAir HFA) 90 mcg/act inhaler 2 puffs, Inhalation, Every 6 hours PRN    apixaban (ELIQUIS) 5 mg, Oral, 2 times daily    atorvastatin (LIPITOR) 80 mg, Oral, Every evening    clopidogrel (PLAVIX) 75  mg, Oral, Daily    diphenhydrAMINE (BENADRYL) 25 mg, Every 6 hours PRN    dofetilide (TIKOSYN) 125 mcg, Oral, Every 12 hours scheduled    Durolane 60 MG/3ML injection     Empagliflozin (JARDIANCE) 25 mg, Oral, Every morning    ergocalciferol (VITAMIN D2) 50,000 Units, Oral, Weekly    fluticasone (FLONASE) 50 mcg/act nasal spray 1 spray, Nasal, Daily    furosemide (LASIX) 20 mg tablet TAKE 2 TABLETS (40 MG TOTAL) BY MOUTH 2 (TWO) TIMES A DAY FOR 5 DAYS. THEN 2 TABLETS (40 MG TOTAL) IN THE AM & THEN 1 TABLET- 20 MG IN THE PM.    furosemide (LASIX) 40 mg, Oral, As needed    isosorbide mononitrate (IMDUR) 30 mg, Oral, 2 times daily, 30 mg in the am and 30 mg in the pm    levothyroxine (SYNTHROID) 75 mcg, Oral, Daily    meclizine (ANTIVERT) 25 mg, Oral, Every 8 hours PRN    metoprolol succinate (TOPROL-XL) 50 mg, Oral, Daily at bedtime    Multiple Vitamin (multivitamin) tablet 1 tablet, Daily    nitroglycerin (NITROSTAT) 0.4 mg SL tablet PLACE 1 TABLET UNDER THE TONGUE EVERY 5 MINUTES AS NEEDED FOR CHEST PAIN.    pantoprazole (PROTONIX) 40 mg, Oral, 2 times daily before meals    potassium chloride (Klor-Con M20) 20 mEq tablet 20 mEq, Oral, As needed    pregabalin (LYRICA) 100 mg, Oral, 3 times daily    sacubitril-valsartan (Entresto) 49-51 MG TABS 1 tablet, Oral, 2 times daily    semaglutide (2 mg/dose) (OZEMPIC) 2 mg, Subcutaneous, Every 7 days      Social History     Socioeconomic History    Marital status:      Spouse name: Not on file    Number of children: 3    Years of education: Not on file    Highest education level: Not on file   Occupational History    Not on file   Tobacco Use    Smoking status: Former     Current packs/day: 0.00     Average packs/day: 1 pack/day for 24.2 years (24.2 ttl pk-yrs)     Types: Cigarettes     Start date: 2000     Quit date: 3/22/2024     Years since quittin.0     Passive exposure: Never    Smokeless tobacco: Never    Tobacco comments:     Smoked 0.5-1 ppd; quit in  03/2024.    Vaping Use    Vaping status: Never Used   Substance and Sexual Activity    Alcohol use: Not Currently     Alcohol/week: 1.0 standard drink of alcohol     Types: 1 Shots of liquor per week     Comment: Every 2or 3months    Drug use: Never    Sexual activity: Not Currently     Partners: Male     Birth control/protection: Post-menopausal   Other Topics Concern    Not on file   Social History Narrative    Not on file     Social Drivers of Health     Financial Resource Strain: Low Risk  (2/20/2023)    [facility]    Overall Financial Resource Strain (CARDIA)     Difficulty of Paying Living Expenses: Not hard at all   Food Insecurity: No Food Insecurity (11/25/2024)    Nursing - Inadequate Food Risk Classification     Worried About Running Out of Food in the Last Year: Never true     Ran Out of Food in the Last Year: Never true     Ran Out of Food in the Last Year: Never true   Transportation Needs: No Transportation Needs (11/25/2024)    Nursing - Transportation Risk Classification     Lack of Transportation: Not on file     Lack of Transportation: No   Physical Activity: Not on file   Stress: No Stress Concern Present (2/20/2023)    [facility]    [employer] - Occupational Stress Questionnaire     Feeling of Stress : Not at all   Social Connections: Unknown (6/18/2024)    Received from [email or web address]    Social Connections     How often do you feel lonely or isolated from those around you? (Adult - for ages 18 years and over): Not on file   Intimate Partner Violence: Unknown (11/25/2024)    Nursing IPS     Feels Physically and Emotionally Safe: Not on file     Physically Hurt by Someone: Not on file     Humiliated or Emotionally Abused by Someone: Not on file     Physically Hurt by Someone: No     Hurt or Threatened by Someone: No   Housing Stability: Unknown  "(2024)    Nursing: Inadequate Housing Risk Classification     Has Housing: Not on file     Worried About Losing Housing: Not on file     Unable to Get Utilities: Not on file     Unable to Pay for Housing in the Last Year: No     Has Housin   Recent Concern: Housing Stability - High Risk (2024)    Housing Stability Vital Sign     Unable to Pay for Housing in the Last Year: No     Number of Times Moved in the Last Year: 2     Homeless in the Last Year: No     Family History   Adopted: Yes   Problem Relation Age of Onset    Depression Mother     Sleep apnea Mother     Heart disease Father     Snoring Father     Restless legs syndrome Father     OCD Daughter      Physical Exam:  Vitals:    25 1051   BP: 100/60   BP Location: Left arm   Patient Position: Sitting   Cuff Size: Standard   Pulse: 70   SpO2: 97%   Weight: 98.8 kg (217 lb 12.8 oz)   Height: 5' 8\" (1.727 m)         Constitutional: NAD, non toxic  Ears/nose/mouth/throat: atraumatic  CV: RRR, nl S1S2, no murmurs/rubs/gallups, no JVD, no HJR  Resp: CTABL  GI: Soft, NTND  MSK: no swollen joints in exposed areas  Extr: No edema, warm LE  Pysche: Normal affect  Neuro: appropriate in conversation  Skin: dry and intact in exposed areas    Labs & Results:  Lab Results   Component Value Date    SODIUM 138 2025    K 4.1 2025     2025    CO2 27 2025    BUN 17 2025    CREATININE 0.97 2025    GLUC 97 2024    CALCIUM 9.7 2025     Lab Results   Component Value Date    WBC 8.40 2025    HGB 12.2 2025    HCT 38.8 2025    MCV 84 2025     2025       Counseling / Coordination of Care  Greater than 50% of total time was spent with the patient and / or family counseling and / or coordination of care.  We discussed diagnoses, most recent studies, tests and any changes in treatment plan.    Thank you for the opportunity to participate in the care of this patient.    Vitor Bell, " MD  Attending Physician  Advanced Heart Failure and Transplant Cardiology  [facility]

## 2025-04-04 ENCOUNTER — TELEPHONE (OUTPATIENT)
Dept: SLEEP CENTER | Facility: CLINIC | Age: 65
End: 2025-04-04

## 2025-04-04 ENCOUNTER — OFFICE VISIT (OUTPATIENT)
Dept: CARDIOLOGY CLINIC | Facility: CLINIC | Age: 65
End: 2025-04-04
Payer: COMMERCIAL

## 2025-04-04 ENCOUNTER — OFFICE VISIT (OUTPATIENT)
Dept: URGENT CARE | Facility: MEDICAL CENTER | Age: 65
End: 2025-04-04
Payer: COMMERCIAL

## 2025-04-04 VITALS
WEIGHT: 217.8 LBS | HEIGHT: 68 IN | DIASTOLIC BLOOD PRESSURE: 60 MMHG | HEART RATE: 70 BPM | SYSTOLIC BLOOD PRESSURE: 100 MMHG | OXYGEN SATURATION: 97 % | BODY MASS INDEX: 33.01 KG/M2

## 2025-04-04 VITALS
HEART RATE: 82 BPM | RESPIRATION RATE: 18 BRPM | SYSTOLIC BLOOD PRESSURE: 107 MMHG | TEMPERATURE: 97.8 F | OXYGEN SATURATION: 97 % | DIASTOLIC BLOOD PRESSURE: 58 MMHG

## 2025-04-04 DIAGNOSIS — I47.20 VENTRICULAR TACHYCARDIA (HCC): ICD-10-CM

## 2025-04-04 DIAGNOSIS — Z95.810 AICD (AUTOMATIC CARDIOVERTER/DEFIBRILLATOR) PRESENT: ICD-10-CM

## 2025-04-04 DIAGNOSIS — K04.7 DENTAL ABSCESS: Primary | ICD-10-CM

## 2025-04-04 DIAGNOSIS — I25.10 CORONARY ARTERY DISEASE INVOLVING NATIVE CORONARY ARTERY OF NATIVE HEART, UNSPECIFIED WHETHER ANGINA PRESENT: ICD-10-CM

## 2025-04-04 DIAGNOSIS — I25.5 ISCHEMIC CARDIOMYOPATHY: Primary | ICD-10-CM

## 2025-04-04 PROCEDURE — S9083 URGENT CARE CENTER GLOBAL: HCPCS

## 2025-04-04 PROCEDURE — 99214 OFFICE O/P EST MOD 30 MIN: CPT | Performed by: STUDENT IN AN ORGANIZED HEALTH CARE EDUCATION/TRAINING PROGRAM

## 2025-04-04 PROCEDURE — G0382 LEV 3 HOSP TYPE B ED VISIT: HCPCS

## 2025-04-04 RX ORDER — AMOXICILLIN 500 MG/1
500 CAPSULE ORAL EVERY 8 HOURS SCHEDULED
Qty: 21 CAPSULE | Refills: 0 | Status: SHIPPED | OUTPATIENT
Start: 2025-04-04 | End: 2025-04-11

## 2025-04-04 RX ORDER — MULTIVITAMIN
1 TABLET ORAL DAILY
COMMUNITY

## 2025-04-04 NOTE — TELEPHONE ENCOUNTER
Incoming call from patient who was in [employer] store.  Patient states [facility] will not fill prescription without the following  documents (sleep study, recent office notes and most recent compliance report) and [employer] representative confirmed these documents need to be faxed to [phone].    Documents faxed to [phone] as requested.

## 2025-04-04 NOTE — PATIENT INSTRUCTIONS
Extra strength Tylenol for your pain  Take the antibiotic as prescribed    Follow-up Monday with your dentist    If you develop fever or chills increased swelling in your face go to the emergency room

## 2025-04-04 NOTE — PROGRESS NOTES
[facility]        NAME: Vesna Langston is a 65 y.o. female  : 1960    MRN: [id number]  DATE: 2025  TIME: 2:35 PM    Assessment and Plan   Dental abscess [K04.7]  1. Dental abscess  amoxicillin (AMOXIL) 500 mg capsule            Patient Instructions   Extra strength Tylenol for your pain  Take the antibiotic as prescribed    Follow-up Monday with your dentist    If you develop fever or chills increased swelling in your face go to the emergency room    Follow up with PCP in 3-5 days.  Proceed to  ER if symptoms worsen.    If tests have been performed at [facility], our office will contact you with results if changes need to be made to the care plan discussed with you at the visit.  [facility]    Chief Complaint     Chief Complaint   Patient presents with    Dental Pain     Patient reports tooth pain, possible abscess. Has appt with dentist on Monday but was instructed to come to urgent care to start antibiotic tx         History of Present Illness       This is a 65-year-old female who presents today with an abscess on the lower left side.  She states it started 2 days ago.  She has an appointment with a dentist on Monday but is having a lot of pain and swelling in the lower jaw.  She denies any fever or chills.  She states she has 2 teeth that are cracked.  There are large dental caries in the lower left side of the jaw.  Patient does take blood thinners.    Dental Pain         Review of Systems   Review of Systems   Constitutional: Negative.    HENT:  Positive for dental problem.    Respiratory: Negative.     Cardiovascular: Negative.    Gastrointestinal: Negative.    Genitourinary: Negative.    Musculoskeletal: Negative.    Neurological: Negative.          Current Medications       Current Outpatient Medications:     amoxicillin (AMOXIL) 500 mg capsule, Take 1 capsule (500 mg total) by mouth every 8 (eight) hours for 7 days, Disp: 21 capsule, Rfl:  0    albuterol (ProAir HFA) 90 mcg/act inhaler, Inhale 2 puffs every 6 (six) hours as needed for wheezing, Disp: 8.5 g, Rfl: 0    apixaban (ELIQUIS) 5 mg, Take 1 tablet (5 mg total) by mouth 2 (two) times a day, Disp: 180 tablet, Rfl: 1    atorvastatin (LIPITOR) 80 mg tablet, TAKE 1 TABLET BY MOUTH EVERY DAY IN THE EVENING, Disp: 90 tablet, Rfl: 1    clopidogrel (PLAVIX) 75 mg tablet, Take 1 tablet (75 mg total) by mouth daily, Disp: 90 tablet, Rfl: 3    diphenhydrAMINE (BENADRYL) 25 mg capsule, Take 25 mg by mouth every 6 (six) hours as needed for itching, Disp: , Rfl:     dofetilide (TIKOSYN) 125 mcg capsule, Take 1 capsule (125 mcg total) by mouth every 12 (twelve) hours, Disp: 180 capsule, Rfl: 3    Durolane 60 MG/3ML injection, , Disp: , Rfl:     Empagliflozin (Jardiance) 25 MG TABS, Take 1 tablet (25 mg total) by mouth every morning, Disp: 30 tablet, Rfl: 17    ergocalciferol (VITAMIN D2) 50,000 units, Take 1 capsule (50,000 Units total) by mouth once a week, Disp: 12 capsule, Rfl: 1    fluticasone (FLONASE) 50 mcg/act nasal spray, 1 spray into each nostril daily, Disp: 9.9 mL, Rfl: 0    furosemide (LASIX) 20 mg tablet, TAKE 2 TABLETS (40 MG TOTAL) BY MOUTH 2 (TWO) TIMES A DAY FOR 5 DAYS. THEN 2 TABLETS (40 MG TOTAL) IN THE AM & THEN 1 TABLET- 20 MG IN THE PM., Disp: 190 tablet, Rfl: 1    furosemide (LASIX) 40 mg tablet, Take 1 tablet (40 mg total) by mouth if needed (if worse shortness of breath or weight up 3lbs), Disp: 60 tablet, Rfl: 5    isosorbide mononitrate (IMDUR) 30 mg 24 hr tablet, TAKE 1 TABLET (30 MG TOTAL) BY MOUTH 2 (TWO) TIMES A DAY 30 MG IN THE AM AND 30 MG IN THE PM, Disp: 180 tablet, Rfl: 1    levothyroxine (Synthroid) 75 mcg tablet, Take 1 tablet (75 mcg total) by mouth daily, Disp: 90 tablet, Rfl: 3    meclizine (ANTIVERT) 25 mg tablet, Take 1 tablet (25 mg total) by mouth every 8 (eight) hours as needed for dizziness, Disp: 30 tablet, Rfl: 0    metoprolol succinate (TOPROL-XL) 25 mg 24 hr  tablet, Take 2 tablets (50 mg total) by mouth daily at bedtime, Disp: 180 tablet, Rfl: 5    Multiple Vitamin (multivitamin) tablet, Take 1 tablet by mouth daily, Disp: , Rfl:     nitroglycerin (NITROSTAT) 0.4 mg SL tablet, PLACE 1 TABLET UNDER THE TONGUE EVERY 5 MINUTES AS NEEDED FOR CHEST PAIN. (Patient taking differently: 0.4 mg as needed for chest pain), Disp: 25 tablet, Rfl: 1    pantoprazole (PROTONIX) 40 mg tablet, TAKE 1 TABLET BY MOUTH 2 TIMES A DAY BEFORE MEALS., Disp: 180 tablet, Rfl: 1    potassium chloride (Klor-Con M20) 20 mEq tablet, Take 1 tablet (20 mEq total) by mouth if needed (on lasix days), Disp: 90 tablet, Rfl: 1    pregabalin (LYRICA) 100 mg capsule, Take 1 capsule (100 mg total) by mouth 3 (three) times a day (Patient not taking: Reported on 4/4/2025), Disp: 90 capsule, Rfl: 2    sacubitril-valsartan (Entresto) 49-51 MG TABS, Take 1 tablet by mouth 2 (two) times a day, Disp: 60 tablet, Rfl: 5    semaglutide, 2 mg/dose, (Ozempic) 8 mg/ mL injection pen, Inject 0.75 mL (2 mg total) under the skin every 7 days Do not start before March 17, 2025., Disp: 9 mL, Rfl: 1    Current Allergies     Allergies as of 04/04/2025 - Reviewed 04/04/2025   Allergen Reaction Noted    Fish-derived products - food allergy Anaphylaxis 10/01/2024    Shellfish-derived products - food allergy Anaphylaxis 06/10/2019    Azithromycin Hives and Rash 10/10/2012            The following portions of the patient's history were reviewed and updated as appropriate: allergies, current medications, past family history, past medical history, past social history, past surgical history and problem list.     Past Medical History:   Diagnosis Date    Acute respiratory insufficiency 03/22/2024    Allergic     Back pain 2022    Bradycardia 3/22/24    Breast cyst 2000    Chronic HFrEF (heart failure with reduced ejection fraction) (HCC)     Clotting disorder (HCC) 4/1/24    Coronary artery disease     COVID-19 virus infection 11/28/2022     Diabetes mellitus (HCC)     Disease of thyroid gland 4/09/2024    Environmental and seasonal allergies 1/1/1960    GERD (gastroesophageal reflux disease) 10/09    GI (gastrointestinal bleed) 4/1/24    Heart disease 3/24    History of placement of internal cardiac defibrillator 3/27/24    Hyperlipidemia     Hypoglycemia     ICD (implantable cardioverter-defibrillator) in place     Ischemic cardiomyopathy     Lactic acidosis 03/22/2024    Migraine 1980    Morning headache 1980    Myocardial infarction ([facility]) 3/22/2024    Otitis media 1966    Pacemaker 3/27/24    Peptic ulceration 4/2024    Seasonal allergies     Sinus problem 1970    ST elevation myocardial infarction involving left anterior descending (LAD) coronary artery ([facility]) 03/22/2024    Tobacco abuse     Visual impairment 1967       Past Surgical History:   Procedure Laterality Date    ADENOIDECTOMY      APPENDECTOMY      APPENDECTOMY LAPAROSCOPIC N/A 05/08/2024    Procedure: APPENDECTOMY LAPAROSCOPIC;  Surgeon: Lauryn Ullrich, DO;  [facility];  Service: General    BACK SURGERY  8/23    BREAST EXCISIONAL BIOPSY Right 09/2000    cyst removed- benign    CARDIAC CATHETERIZATION N/A 03/22/2024    Procedure: Cardiac pci;  Surgeon: Gabriel Myers MD;  [facility];  Service: Cardiology    CARDIAC CATHETERIZATION N/A 03/22/2024    Procedure: Cardiac Coronary Angiogram;  Surgeon: Gabriel Myers MD;  [facility];  Service: Cardiology    CARDIAC CATHETERIZATION N/A 03/22/2024    Procedure: Cardiac PCI AMI;  Surgeon: Gabriel Myers MD;  [facility];  Service: Cardiology    CARDIAC CATHETERIZATION N/A 03/22/2024    Procedure: Cardiac IVUS;  Surgeon: Gabriel Myers MD;  [facility];  Service: Cardiology    CARDIAC DEFIBRILLATOR PLACEMENT      CARDIAC ELECTROPHYSIOLOGY PROCEDURE N/A 03/27/2024    Procedure: Cardiac icd implant;  Surgeon: Jeffy Lama MD;  [facility];  Service:  Cardiology     SECTION      COLONOSCOPY      ECTOPIC PREGNANCY SURGERY      INSERT / REPLACE / REMOVE PACEMAKER      MASS EXCISION Left 10/18/2024    Procedure: EXCISION BIOPSY TISSUE LESION/MASS UPPER EXTREMITY - Left index finger;  Surgeon: Leandro Campos MD;  Location: [facility];  Service: Orthopedics    SEPTOPLASTY      SPINE SURGERY  23    TONSILLECTOMY      TONSILLECTOMY AND ADENOIDECTOMY  1964    TRIGGER POINT INJECTION      UPPER GASTROINTESTINAL ENDOSCOPY  24       Family History   Adopted: Yes   Problem Relation Age of Onset    Depression Mother     Sleep apnea Mother     Heart disease Father     Snoring Father     Restless legs syndrome Father     OCD Daughter          Medications have been verified.        Objective   /58   Pulse 82   Temp 97.8 °F (36.6 °C)   Resp 18   SpO2 97%   No LMP recorded. Patient is postmenopausal.       Physical Exam     Physical Exam  Constitutional:       Appearance: Normal appearance.   HENT:      Head: Normocephalic and atraumatic.      Right Ear: Tympanic membrane, ear canal and external ear normal.      Left Ear: Tympanic membrane, ear canal and external ear normal.      Nose: Nose normal.      Mouth/Throat:      Mouth: Mucous membranes are moist.      Pharynx: Oropharynx is clear. No posterior oropharyngeal erythema.        Comments: Patient has multiple dental caries in her mouth multiple teeth missing.  Patient does have large dental caries on the left gums slightly erythematous.  Eyes:      Conjunctiva/sclera: Conjunctivae normal.      Pupils: Pupils are equal, round, and reactive to light.   Cardiovascular:      Rate and Rhythm: Normal rate and regular rhythm.      Pulses: Normal pulses.      Heart sounds: Normal heart sounds.   Pulmonary:      Effort: Pulmonary effort is normal.      Breath sounds: Normal breath sounds.   Abdominal:      General: Abdomen is flat. Bowel sounds are normal.   Musculoskeletal:         General: Normal range of  motion.   Skin:     General: Skin is warm.      Capillary Refill: Capillary refill takes less than 2 seconds.   Neurological:      General: No focal deficit present.      Mental Status: She is alert and oriented to person, place, and time.

## 2025-04-05 ENCOUNTER — OFFICE VISIT (OUTPATIENT)
Dept: URGENT CARE | Facility: CLINIC | Age: 65
End: 2025-04-05
Payer: COMMERCIAL

## 2025-04-05 VITALS
BODY MASS INDEX: 32.74 KG/M2 | RESPIRATION RATE: 18 BRPM | HEIGHT: 68 IN | OXYGEN SATURATION: 97 % | DIASTOLIC BLOOD PRESSURE: 62 MMHG | WEIGHT: 216 LBS | HEART RATE: 82 BPM | TEMPERATURE: 98.1 F | SYSTOLIC BLOOD PRESSURE: 122 MMHG

## 2025-04-05 DIAGNOSIS — R11.2 NAUSEA AND VOMITING, UNSPECIFIED VOMITING TYPE: ICD-10-CM

## 2025-04-05 DIAGNOSIS — R42 DIZZINESS AND GIDDINESS: ICD-10-CM

## 2025-04-05 DIAGNOSIS — T50.905A MEDICATION SIDE EFFECT, INITIAL ENCOUNTER: Primary | ICD-10-CM

## 2025-04-05 LAB — GLUCOSE SERPL-MCNC: 138 MG/DL (ref 65–140)

## 2025-04-05 PROCEDURE — G0382 LEV 3 HOSP TYPE B ED VISIT: HCPCS

## 2025-04-05 PROCEDURE — S9083 URGENT CARE CENTER GLOBAL: HCPCS

## 2025-04-05 PROCEDURE — 82948 REAGENT STRIP/BLOOD GLUCOSE: CPT

## 2025-04-05 RX ORDER — MECLIZINE HYDROCHLORIDE 25 MG/1
25 TABLET ORAL EVERY 8 HOURS PRN
Qty: 15 TABLET | Refills: 0 | Status: SHIPPED | OUTPATIENT
Start: 2025-04-05

## 2025-04-05 NOTE — PROGRESS NOTES
"  [facility]        NAME: Vesna Langston is a 65 y.o. female  : 1960    MRN: [id number]  DATE: 2025  TIME: 12:11 PM    Assessment and Plan   Medication side effect, initial encounter [T52.479C]  1. Medication side effect, initial encounter        2. Dizziness and giddiness  meclizine (ANTIVERT) 25 mg tablet      3. Nausea and vomiting, unspecified vomiting type  meclizine (ANTIVERT) 25 mg tablet        . Likely side effect of medication. No Zofran but will refill Meclizine. VSS. Encouraged continued supportive measures.  Close follow up with PCP in 3-5 days or proceed to emergency department for worsening symptoms.  Patient verbalized understanding of instructions given.     Patient Instructions       Take medication as prescribed  Monitor symptoms  Increase fluid intake   Discuss medication dosage with PCP   Follow up with PCP in 3-5 days.  Proceed to  ER if symptoms worsen.    If tests have been performed at [facility], our office will contact you with results if changes need to be made to the care plan discussed with you at the visit.  [facility]    Chief Complaint     Chief Complaint   Patient presents with    Vomiting     Did a new round of ozempic on Wednesday and Thursday started projectile vomiting and hasn't stopped since.            History of Present Illness       65-year-old female with a past medical history significant for CHF, CAD, MI, and type II DM presents with complaints of nausea and vomiting since Wednesday.  Patient reports increasing dose of Ozempic and after administered on Wednesday has had nausea and vomiting since accompanied by some dizziness described as feeling \"wobbly.\"  She denies fever, chills, or diarrhea.  States able to eat and drink however followed by \"projectile vomiting.\"  No known sick contacts or exposures.  Did reach out to PCP but no response and scheduled for follow-up visit on Wednesday.  States "  prescriptions of meclizine and Zofran at home.     BS well-controlled at home with recordings < 120. No hypoglycemic episodes. No numbness, tingling, or weakness. No vision changes.     Vomiting   Associated symptoms include dizziness. Pertinent negatives include no abdominal pain, chest pain, chills, coughing, diarrhea, fever or headaches.       Review of Systems   Review of Systems   Constitutional:  Negative for chills and fever.   HENT:  Negative for congestion, ear discharge, ear pain, rhinorrhea and sore throat.    Eyes:  Negative for discharge.   Respiratory:  Negative for cough, shortness of breath and wheezing.    Cardiovascular:  Negative for chest pain.   Gastrointestinal:  Positive for nausea and vomiting. Negative for abdominal pain and diarrhea.   Genitourinary:  Negative for decreased urine volume and difficulty urinating.   Skin:  Negative for rash.   Neurological:  Positive for dizziness. Negative for syncope, weakness, light-headedness, numbness and headaches.         Current Medications       Current Outpatient Medications:     albuterol (ProAir HFA) 90 mcg/act inhaler, Inhale 2 puffs every 6 (six) hours as needed for wheezing, Disp: 8.5 g, Rfl: 0    amoxicillin (AMOXIL) 500 mg capsule, Take 1 capsule (500 mg total) by mouth every 8 (eight) hours for 7 days, Disp: 21 capsule, Rfl: 0    apixaban (ELIQUIS) 5 mg, Take 1 tablet (5 mg total) by mouth 2 (two) times a day, Disp: 180 tablet, Rfl: 1    atorvastatin (LIPITOR) 80 mg tablet, TAKE 1 TABLET BY MOUTH EVERY DAY IN THE EVENING, Disp: 90 tablet, Rfl: 1    clopidogrel (PLAVIX) 75 mg tablet, Take 1 tablet (75 mg total) by mouth daily, Disp: 90 tablet, Rfl: 3    diphenhydrAMINE (BENADRYL) 25 mg capsule, Take 25 mg by mouth every 6 (six) hours as needed for itching, Disp: , Rfl:     dofetilide (TIKOSYN) 125 mcg capsule, Take 1 capsule (125 mcg total) by mouth every 12 (twelve) hours, Disp: 180 capsule, Rfl: 3    Durolane 60 MG/3ML injection, ,  Disp: , Rfl:     Empagliflozin (Jardiance) 25 MG TABS, Take 1 tablet (25 mg total) by mouth every morning, Disp: 30 tablet, Rfl: 17    ergocalciferol (VITAMIN D2) 50,000 units, Take 1 capsule (50,000 Units total) by mouth once a week, Disp: 12 capsule, Rfl: 1    fluticasone (FLONASE) 50 mcg/act nasal spray, 1 spray into each nostril daily, Disp: 9.9 mL, Rfl: 0    furosemide (LASIX) 20 mg tablet, TAKE 2 TABLETS (40 MG TOTAL) BY MOUTH 2 (TWO) TIMES A DAY FOR 5 DAYS. THEN 2 TABLETS (40 MG TOTAL) IN THE AM & THEN 1 TABLET- 20 MG IN THE PM., Disp: 190 tablet, Rfl: 1    furosemide (LASIX) 40 mg tablet, Take 1 tablet (40 mg total) by mouth if needed (if worse shortness of breath or weight up 3lbs), Disp: 60 tablet, Rfl: 5    isosorbide mononitrate (IMDUR) 30 mg 24 hr tablet, TAKE 1 TABLET (30 MG TOTAL) BY MOUTH 2 (TWO) TIMES A DAY 30 MG IN THE AM AND 30 MG IN THE PM, Disp: 180 tablet, Rfl: 1    levothyroxine (Synthroid) 75 mcg tablet, Take 1 tablet (75 mcg total) by mouth daily, Disp: 90 tablet, Rfl: 3    meclizine (ANTIVERT) 25 mg tablet, Take 1 tablet (25 mg total) by mouth every 8 (eight) hours as needed for dizziness, Disp: 15 tablet, Rfl: 0    metoprolol succinate (TOPROL-XL) 25 mg 24 hr tablet, Take 2 tablets (50 mg total) by mouth daily at bedtime, Disp: 180 tablet, Rfl: 5    Multiple Vitamin (multivitamin) tablet, Take 1 tablet by mouth daily, Disp: , Rfl:     nitroglycerin (NITROSTAT) 0.4 mg SL tablet, PLACE 1 TABLET UNDER THE TONGUE EVERY 5 MINUTES AS NEEDED FOR CHEST PAIN., Disp: 25 tablet, Rfl: 1    pantoprazole (PROTONIX) 40 mg tablet, TAKE 1 TABLET BY MOUTH 2 TIMES A DAY BEFORE MEALS., Disp: 180 tablet, Rfl: 1    potassium chloride (Klor-Con M20) 20 mEq tablet, Take 1 tablet (20 mEq total) by mouth if needed (on lasix days), Disp: 90 tablet, Rfl: 1    pregabalin (LYRICA) 100 mg capsule, Take 1 capsule (100 mg total) by mouth 3 (three) times a day, Disp: 90 capsule, Rfl: 2    sacubitril-valsartan (Entresto)  49-51 MG TABS, Take 1 tablet by mouth 2 (two) times a day, Disp: 60 tablet, Rfl: 5    semaglutide, 2 mg/dose, (Ozempic) 8 mg/ mL injection pen, Inject 0.75 mL (2 mg total) under the skin every 7 days Do not start before March 17, 2025., Disp: 9 mL, Rfl: 1    Current Allergies     Allergies as of 04/05/2025 - Reviewed 04/05/2025   Allergen Reaction Noted    Fish-derived products - food allergy Anaphylaxis 10/01/2024    Shellfish-derived products - food allergy Anaphylaxis 06/10/2019    Azithromycin Hives and Rash 10/10/2012            The following portions of the patient's history were reviewed and updated as appropriate: allergies, current medications, past family history, past medical history, past social history, past surgical history and problem list.     Past Medical History:   Diagnosis Date    Acute respiratory insufficiency 03/22/2024    Allergic     Back pain 2022    Bradycardia 3/22/24    Breast cyst 2000    Chronic HFrEF (heart failure with reduced ejection fraction) ([facility])     Clotting disorder ([facility]) 4/1/24    Coronary artery disease     COVID-19 virus infection 11/28/2022    Diabetes mellitus ([facility])     Disease of thyroid gland 4/09/2024    Environmental and seasonal allergies 1/1/1960    GERD (gastroesophageal reflux disease) 10/09    GI (gastrointestinal bleed) 4/1/24    Heart disease 3/24    History of placement of internal cardiac defibrillator 3/27/24    Hyperlipidemia     Hypoglycemia     ICD (implantable cardioverter-defibrillator) in place     Ischemic cardiomyopathy     Lactic acidosis 03/22/2024    Migraine 1980    Morning headache 1980    Myocardial infarction ([facility]) 3/22/2024    Otitis media 1966    Pacemaker 3/27/24    Peptic ulceration 4/2024    Seasonal allergies     Sinus problem 1970    ST elevation myocardial infarction involving left anterior descending (LAD) coronary artery ([facility]) 03/22/2024    Tobacco abuse     Visual impairment 1967       Past Surgical History:   Procedure Laterality  "Date    ADENOIDECTOMY      APPENDECTOMY      APPENDECTOMY LAPAROSCOPIC N/A 2024    Procedure: APPENDECTOMY LAPAROSCOPIC;  Surgeon: Lauryn Ullrich, DO;  [facility]: [facility];  Service: General    BACK SURGERY      BREAST EXCISIONAL BIOPSY Right 2000    cyst removed- benign    CARDIAC CATHETERIZATION N/A 2024    Procedure: Cardiac pci;  Surgeon: Gabriel Myers MD;  [facility];  Service: Cardiology    CARDIAC CATHETERIZATION N/A 2024    Procedure: Cardiac Coronary Angiogram;  Surgeon: Gabriel Myers MD;  [facility];  Service: Cardiology    CARDIAC CATHETERIZATION N/A 2024    Procedure: Cardiac PCI AMI;  Surgeon: Gabriel Myers MD;  [facility];  Service: Cardiology    CARDIAC CATHETERIZATION N/A 2024    Procedure: Cardiac IVUS;  Surgeon: Gabriel Myers MD;  [facility];  Service: Cardiology    CARDIAC DEFIBRILLATOR PLACEMENT      CARDIAC ELECTROPHYSIOLOGY PROCEDURE N/A 2024    Procedure: Cardiac icd implant;  Surgeon: Jeffy Lama MD;  [facility];  Service: Cardiology     SECTION      COLONOSCOPY      ECTOPIC PREGNANCY SURGERY      INSERT / REPLACE / REMOVE PACEMAKER      MASS EXCISION Left 10/18/2024    Procedure: EXCISION BIOPSY TISSUE LESION/MASS UPPER EXTREMITY - Left index finger;  Surgeon: Leandro Campos MD;  Location: [facility];  Service: Orthopedics    SEPTOPLASTY      SPINE SURGERY  23    TONSILLECTOMY      TONSILLECTOMY AND ADENOIDECTOMY  1964    TRIGGER POINT INJECTION      UPPER GASTROINTESTINAL ENDOSCOPY  24       Family History   Adopted: Yes   Problem Relation Age of Onset    Depression Mother     Sleep apnea Mother     Heart disease Father     Snoring Father     Restless legs syndrome Father     OCD Daughter          Medications have been verified.        Objective   /62   Pulse 82   Temp 98.1 °F (36.7 °C)   Resp 18   Ht 5' 8\" (1.727 m)   Wt 98 kg " (216 lb)   SpO2 97%   BMI 32.84 kg/m²   No LMP recorded. Patient is postmenopausal.       Physical Exam     Physical Exam  Vitals and nursing note reviewed.   Constitutional:       General: She is not in acute distress.     Appearance: She is not toxic-appearing.   HENT:      Head: Normocephalic.      Right Ear: Tympanic membrane, ear canal and external ear normal.      Left Ear: Tympanic membrane, ear canal and external ear normal.      Nose: Nose normal.      Mouth/Throat:      Mouth: Mucous membranes are moist.      Pharynx: Oropharynx is clear.   Eyes:      Conjunctiva/sclera: Conjunctivae normal.   Cardiovascular:      Rate and Rhythm: Normal rate and regular rhythm.      Heart sounds: Normal heart sounds.   Pulmonary:      Effort: Pulmonary effort is normal. No respiratory distress.      Breath sounds: Normal breath sounds. No stridor. No wheezing, rhonchi or rales.   Lymphadenopathy:      Cervical: No cervical adenopathy.   Skin:     General: Skin is warm and dry.   Neurological:      Mental Status: She is alert and oriented to person, place, and time.      GCS: GCS eye subscore is 4. GCS verbal subscore is 5. GCS motor subscore is 6.      Sensory: Sensation is intact.      Motor: Motor function is intact.      Gait: Gait is intact.   Psychiatric:         Mood and Affect: Mood normal.         Behavior: Behavior normal.

## 2025-04-05 NOTE — PATIENT INSTRUCTIONS
Take medication as prescribed  Monitor symptoms  Increase fluid intake   Discuss medication dosage with PCP   Follow up with PCP in 3-5 days.  Proceed to  ER if symptoms worsen.    If tests have [facility] performed at [facility], our office will contact you with results if changes need to be made to the care plan discussed with you at the visit.  You can review your full results on [facility].

## 2025-04-06 ENCOUNTER — APPOINTMENT (EMERGENCY)
Dept: CT IMAGING | Facility: HOSPITAL | Age: 65
End: 2025-04-06
Payer: COMMERCIAL

## 2025-04-06 ENCOUNTER — HOSPITAL ENCOUNTER (EMERGENCY)
Facility: HOSPITAL | Age: 65
Discharge: HOME | End: 2025-04-06
Attending: EMERGENCY MEDICINE
Payer: COMMERCIAL

## 2025-04-06 VITALS
HEART RATE: 80 BPM | WEIGHT: 214 LBS | SYSTOLIC BLOOD PRESSURE: 121 MMHG | BODY MASS INDEX: 32.43 KG/M2 | HEIGHT: 68 IN | OXYGEN SATURATION: 97 % | TEMPERATURE: 97.6 F | DIASTOLIC BLOOD PRESSURE: 63 MMHG | RESPIRATION RATE: 16 BRPM

## 2025-04-06 DIAGNOSIS — R11.2 NAUSEA & VOMITING: ICD-10-CM

## 2025-04-06 DIAGNOSIS — S09.90XA INJURY OF HEAD, INITIAL ENCOUNTER: Primary | ICD-10-CM

## 2025-04-06 LAB
2HR DELTA HS TROPONIN: 0 NG/L
ABO GROUP BLD: NORMAL
ALBUMIN SERPL BCG-MCNC: 4.3 G/DL (ref 3.5–5)
ALP SERPL-CCNC: 46 U/L (ref 34–104)
ALT SERPL W P-5'-P-CCNC: 18 U/L (ref 7–52)
ANION GAP SERPL CALCULATED.3IONS-SCNC: 10 MMOL/L (ref 4–13)
APTT PPP: 30 SECONDS (ref 23–34)
AST SERPL W P-5'-P-CCNC: 20 U/L (ref 13–39)
BASOPHILS # BLD AUTO: 0.04 THOUSANDS/ÂΜL (ref 0–0.1)
BASOPHILS NFR BLD AUTO: 1 % (ref 0–1)
BILIRUB SERPL-MCNC: 0.64 MG/DL (ref 0.2–1)
BLD GP AB SCN SERPL QL: NEGATIVE
BUN SERPL-MCNC: 10 MG/DL (ref 5–25)
CALCIUM SERPL-MCNC: 9.1 MG/DL (ref 8.4–10.2)
CARDIAC TROPONIN I PNL SERPL HS: 6 NG/L (ref ?–50)
CARDIAC TROPONIN I PNL SERPL HS: 6 NG/L (ref ?–50)
CHLORIDE SERPL-SCNC: 105 MMOL/L (ref 96–108)
CK SERPL-CCNC: 83 U/L (ref 26–192)
CO2 SERPL-SCNC: 25 MMOL/L (ref 21–32)
CREAT SERPL-MCNC: 0.89 MG/DL (ref 0.6–1.3)
EOSINOPHIL # BLD AUTO: 0.39 THOUSAND/ÂΜL (ref 0–0.61)
EOSINOPHIL NFR BLD AUTO: 6 % (ref 0–6)
ERYTHROCYTE [DISTWIDTH] IN BLOOD BY AUTOMATED COUNT: 16 % (ref 11.6–15.1)
GFR SERPL CREATININE-BSD FRML MDRD: 67 ML/MIN/1.73SQ M
GLUCOSE SERPL-MCNC: 97 MG/DL (ref 65–140)
HCT VFR BLD AUTO: 41.4 % (ref 34.8–46.1)
HGB BLD-MCNC: 12.9 G/DL (ref 11.5–15.4)
IMM GRANULOCYTES # BLD AUTO: 0.02 THOUSAND/UL (ref 0–0.2)
IMM GRANULOCYTES NFR BLD AUTO: 0 % (ref 0–2)
INR PPP: 1.21 (ref 0.85–1.19)
LYMPHOCYTES # BLD AUTO: 1.95 THOUSANDS/ÂΜL (ref 0.6–4.47)
LYMPHOCYTES NFR BLD AUTO: 30 % (ref 14–44)
MCH RBC QN AUTO: 26 PG (ref 26.8–34.3)
MCHC RBC AUTO-ENTMCNC: 31.2 G/DL (ref 31.4–37.4)
MCV RBC AUTO: 84 FL (ref 82–98)
MONOCYTES # BLD AUTO: 1 THOUSAND/ÂΜL (ref 0.17–1.22)
MONOCYTES NFR BLD AUTO: 15 % (ref 4–12)
NEUTROPHILS # BLD AUTO: 3.09 THOUSANDS/ÂΜL (ref 1.85–7.62)
NEUTS SEG NFR BLD AUTO: 48 % (ref 43–75)
NRBC BLD AUTO-RTO: 0 /100 WBCS
PLATELET # BLD AUTO: 296 THOUSANDS/UL (ref 149–390)
PMV BLD AUTO: 9.6 FL (ref 8.9–12.7)
POTASSIUM SERPL-SCNC: 3.7 MMOL/L (ref 3.5–5.3)
PROT SERPL-MCNC: 7 G/DL (ref 6.4–8.4)
PROTHROMBIN TIME: 15.7 SECONDS (ref 12.3–15)
RBC # BLD AUTO: 4.96 MILLION/UL (ref 3.81–5.12)
RH BLD: POSITIVE
SODIUM SERPL-SCNC: 140 MMOL/L (ref 135–147)
SPECIMEN EXPIRATION DATE: NORMAL
WBC # BLD AUTO: 6.49 THOUSAND/UL (ref 4.31–10.16)

## 2025-04-06 PROCEDURE — 71260 CT THORAX DX C+: CPT

## 2025-04-06 PROCEDURE — 86900 BLOOD TYPING SEROLOGIC ABO: CPT | Performed by: EMERGENCY MEDICINE

## 2025-04-06 PROCEDURE — 96375 TX/PRO/DX INJ NEW DRUG ADDON: CPT

## 2025-04-06 PROCEDURE — 74177 CT ABD & PELVIS W/CONTRAST: CPT

## 2025-04-06 PROCEDURE — 80053 COMPREHEN METABOLIC PANEL: CPT | Performed by: EMERGENCY MEDICINE

## 2025-04-06 PROCEDURE — 85610 PROTHROMBIN TIME: CPT | Performed by: EMERGENCY MEDICINE

## 2025-04-06 PROCEDURE — 99285 EMERGENCY DEPT VISIT HI MDM: CPT

## 2025-04-06 PROCEDURE — 86901 BLOOD TYPING SEROLOGIC RH(D): CPT | Performed by: EMERGENCY MEDICINE

## 2025-04-06 PROCEDURE — 86850 RBC ANTIBODY SCREEN: CPT | Performed by: EMERGENCY MEDICINE

## 2025-04-06 PROCEDURE — 93005 ELECTROCARDIOGRAM TRACING: CPT

## 2025-04-06 PROCEDURE — 72125 CT NECK SPINE W/O DYE: CPT

## 2025-04-06 PROCEDURE — 85730 THROMBOPLASTIN TIME PARTIAL: CPT | Performed by: EMERGENCY MEDICINE

## 2025-04-06 PROCEDURE — 85025 COMPLETE CBC W/AUTO DIFF WBC: CPT | Performed by: EMERGENCY MEDICINE

## 2025-04-06 PROCEDURE — 99285 EMERGENCY DEPT VISIT HI MDM: CPT | Performed by: EMERGENCY MEDICINE

## 2025-04-06 PROCEDURE — 96372 THER/PROPH/DIAG INJ SC/IM: CPT

## 2025-04-06 PROCEDURE — 96365 THER/PROPH/DIAG IV INF INIT: CPT

## 2025-04-06 PROCEDURE — 70450 CT HEAD/BRAIN W/O DYE: CPT

## 2025-04-06 PROCEDURE — 82550 ASSAY OF CK (CPK): CPT | Performed by: EMERGENCY MEDICINE

## 2025-04-06 PROCEDURE — 36415 COLL VENOUS BLD VENIPUNCTURE: CPT | Performed by: EMERGENCY MEDICINE

## 2025-04-06 PROCEDURE — 84484 ASSAY OF TROPONIN QUANT: CPT | Performed by: EMERGENCY MEDICINE

## 2025-04-06 RX ORDER — DIPHENHYDRAMINE HYDROCHLORIDE 50 MG/ML
12.5 INJECTION, SOLUTION INTRAMUSCULAR; INTRAVENOUS ONCE
Status: COMPLETED | OUTPATIENT
Start: 2025-04-06 | End: 2025-04-06

## 2025-04-06 RX ORDER — TRIMETHOBENZAMIDE HYDROCHLORIDE 300 MG/1
300 CAPSULE ORAL 3 TIMES DAILY PRN
Qty: 10 CAPSULE | Refills: 0 | Status: SHIPPED | OUTPATIENT
Start: 2025-04-06 | End: 2025-04-17 | Stop reason: ALTCHOICE

## 2025-04-06 RX ORDER — ONDANSETRON 4 MG/1
4 TABLET, FILM COATED ORAL EVERY 6 HOURS
Qty: 12 TABLET | Refills: 0 | Status: SHIPPED | OUTPATIENT
Start: 2025-04-06 | End: 2025-04-10

## 2025-04-06 RX ORDER — ONDANSETRON 4 MG/1
4 TABLET, FILM COATED ORAL EVERY 6 HOURS
Qty: 12 TABLET | Refills: 0 | Status: SHIPPED | OUTPATIENT
Start: 2025-04-06 | End: 2025-04-06

## 2025-04-06 RX ORDER — MAGNESIUM SULFATE 1 G/100ML
1 INJECTION INTRAVENOUS ONCE
Status: COMPLETED | OUTPATIENT
Start: 2025-04-06 | End: 2025-04-06

## 2025-04-06 RX ORDER — METOCLOPRAMIDE 10 MG/1
10 TABLET ORAL EVERY 6 HOURS PRN
Qty: 10 TABLET | Refills: 0 | Status: SHIPPED | OUTPATIENT
Start: 2025-04-06 | End: 2025-04-17 | Stop reason: ALTCHOICE

## 2025-04-06 RX ADMIN — TRIMETHOBENZAMIDE HYDROCHLORIDE 200 MG: 100 INJECTION INTRAMUSCULAR at 14:10

## 2025-04-06 RX ADMIN — MAGNESIUM SULFATE HEPTAHYDRATE 1 G: 1 INJECTION, SOLUTION INTRAVENOUS at 14:08

## 2025-04-06 RX ADMIN — IOHEXOL 100 ML: 350 INJECTION, SOLUTION INTRAVENOUS at 14:08

## 2025-04-06 RX ADMIN — SODIUM CHLORIDE 1000 ML: 0.9 INJECTION, SOLUTION INTRAVENOUS at 14:08

## 2025-04-06 RX ADMIN — DIPHENHYDRAMINE HYDROCHLORIDE 12.5 MG: 50 INJECTION, SOLUTION INTRAMUSCULAR; INTRAVENOUS at 14:08

## 2025-04-06 NOTE — ED PROVIDER NOTES
Emergency Department Trauma Note  Vesna Langston 65 y.o. female MRN: [id number]  Unit/Bed#: ED 09/ED 09 Encounter: [id number]      Trauma Alert: Trauma Acuity: Trauma Evaluation  Model of Arrival:   via    Trauma Team: Current Providers  Attending Provider: Ankit Benjamin MD  Registered Nurse: Sofya Carias RN  Consultants:     None      History of Present Illness     Chief Complaint:   Chief Complaint   Patient presents with    Vomiting    Fall     Patient reports projectile vomiting and dizziness since Thursday. Seen at  yesterday and instructed to come to ED if it does not improve by today. Just started max dose of ozempic this past Wednesday. Patient reports losing 5 lbs since Thursday.  declined giving patient PO zofran d/t QT interval. Patient did get meclizine for dizziness which helps if she is able to keep it down. Patient also fell forward this AM d/t dizziness. R index finger is bruised, but does not hurt.     Dizziness     Unaware of head strike during fall this AM around 1100. Patient remembers getting dizzy and going down, but does not remember what exactly happened. Patient is on Plavix and Eloquis.      HPI:  Vesna Langston is a 65 y.o. female who presents with fall syncope.  Mechanism:Details of Incident: Fell from standing and landed on vinyl. Unaware of headstrike. + LOC Injury Date: 04/06/25 Injury Time: 1100 Injury Occurence Location - Specify County: [facility]    Patient recently had her Ozempic increased on Wednesday.  Now having nausea vomiting since Thursday.  Was seen in urgent care.  Wanted Zofran.  They did not give it to her because of QT history.  Today was vomiting.  Got dizzy and lightheaded.  Fell to the ground.  Hit her head on the ground.  Is on blood thinners.  No change in speech or vision.  Still having nausea and vomiting.  No diarrhea.      History provided by:  Patient   used: No    Vomiting  Severity:  Mild  Duration:  4 days  Timing:   Constant  Progression:  Unchanged  Chronicity:  New  Recent urination:  Normal  Relieved by:  Nothing  Worsened by:  Nothing  Ineffective treatments:  None tried  Associated symptoms: abdominal pain    Associated symptoms: no chills, no cough, no diarrhea, no fever, no headaches, no sore throat and no URI    Fall  Associated symptoms: abdominal pain and vomiting    Associated symptoms: no chest pain, no headaches, no hearing loss, no nausea, no neck pain and no seizures    Dizziness  Associated symptoms: vomiting and weakness    Associated symptoms: no blood in stool, no chest pain, no diarrhea, no headaches, no hearing loss, no nausea, no palpitations and no shortness of breath      Review of Systems   Constitutional:  Positive for appetite change. Negative for chills and fever.   HENT:  Negative for ear pain, hearing loss, sore throat, trouble swallowing and voice change.    Eyes:  Negative for pain and discharge.   Respiratory:  Negative for cough, shortness of breath and wheezing.    Cardiovascular:  Negative for chest pain and palpitations.   Gastrointestinal:  Positive for abdominal pain and vomiting. Negative for blood in stool, constipation, diarrhea and nausea.   Genitourinary:  Negative for dysuria, flank pain, frequency and hematuria.   Musculoskeletal:  Negative for joint swelling, neck pain and neck stiffness.   Skin:  Negative for rash and wound.   Neurological:  Positive for dizziness, syncope, weakness and light-headedness. Negative for seizures, facial asymmetry and headaches.   Psychiatric/Behavioral:  Negative for hallucinations, self-injury and suicidal ideas.    All other systems reviewed and are negative.      Historical Information     Immunizations:   Immunization History   Administered Date(s) Administered    COVID-19 MODERNA VACC 0.5 ML IM 04/19/2021, 05/17/2021    COVID-19 Moderna mRNA Vaccine 12 Yr+ 50 mcg/0.5 mL (Spikevax) 09/04/2024    Hep A / Hep B 09/05/2024, 10/03/2024, 03/10/2025     INFLUENZA 10/10/2019, 09/23/2021, 09/22/2022, 12/13/2023    Influenza Split High Dose Preservative Free IM 09/04/2024    Pneumococcal Conjugate Vaccine 20-valent (Pcv20), Polysace 04/04/2023    Respiratory Syncytial Virus Vaccine (Recombinant, Adjuvanted) 09/05/2024    Tdap 09/18/2020    Typhoid, Unspecified 10/10/2019    Zoster Vaccine Recombinant 09/18/2020, 01/04/2021       Past Medical History:   Diagnosis Date    Acute respiratory insufficiency 03/22/2024    Allergic     Back pain 2022    Bradycardia 3/22/24    Breast cyst 2000    Chronic HFrEF (heart failure with reduced ejection fraction) ([facility])     Clotting disorder ([facility]) 4/1/24    Coronary artery disease     COVID-19 virus infection 11/28/2022    Diabetes mellitus ([facility])     Disease of thyroid gland 4/09/2024    Environmental and seasonal allergies 1/1/1960    GERD (gastroesophageal reflux disease) 10/09    GI (gastrointestinal bleed) 4/1/24    Heart disease 3/24    History of placement of internal cardiac defibrillator 3/27/24    Hyperlipidemia     Hypoglycemia     ICD (implantable cardioverter-defibrillator) in place     Ischemic cardiomyopathy     Lactic acidosis 03/22/2024    Migraine 1980    Morning headache 1980    Myocardial infarction ([facility]) 3/22/2024    Otitis media 1966    Pacemaker 3/27/24    Peptic ulceration 4/2024    Seasonal allergies     Sinus problem 1970    ST elevation myocardial infarction involving left anterior descending (LAD) coronary artery ([facility]) 03/22/2024    Tobacco abuse     Visual impairment 1967       Family History   Adopted: Yes   Problem Relation Age of Onset    Depression Mother     Sleep apnea Mother     Heart disease Father     Snoring Father     Restless legs syndrome Father     OCD Daughter      Past Surgical History:   Procedure Laterality Date    ADENOIDECTOMY      APPENDECTOMY      APPENDECTOMY LAPAROSCOPIC N/A 05/08/2024    Procedure: APPENDECTOMY LAPAROSCOPIC;  Surgeon: Lauryn Ullrich, DO;  Location: [facility];   Service: General    BACK SURGERY      BREAST EXCISIONAL BIOPSY Right 2000    cyst removed- benign    CARDIAC CATHETERIZATION N/A 2024    Procedure: Cardiac pci;  Surgeon: Gabriel Myers MD;  [facility];  Service: Cardiology    CARDIAC CATHETERIZATION N/A 2024    Procedure: Cardiac Coronary Angiogram;  Surgeon: Gabriel Myers MD;  [facility];  Service: Cardiology    CARDIAC CATHETERIZATION N/A 2024    Procedure: Cardiac PCI AMI;  Surgeon: Gabriel Myers MD;  [facility];  Service: Cardiology    CARDIAC CATHETERIZATION N/A 2024    Procedure: Cardiac IVUS;  Surgeon: Gabriel Myers MD;  [facility];  Service: Cardiology    CARDIAC DEFIBRILLATOR PLACEMENT      CARDIAC ELECTROPHYSIOLOGY PROCEDURE N/A 2024    Procedure: Cardiac icd implant;  Surgeon: Jeffy Lama MD;  [facility];  Service: Cardiology     SECTION      COLONOSCOPY      ECTOPIC PREGNANCY SURGERY      INSERT / REPLACE / REMOVE PACEMAKER      MASS EXCISION Left 10/18/2024    Procedure: EXCISION BIOPSY TISSUE LESION/MASS UPPER EXTREMITY - Left index finger;  Surgeon: Leandro Campos MD;  Location:  MAIN OR;  Service: Orthopedics    SEPTOPLASTY      SPINE SURGERY  21    TONSILLECTOMY      TONSILLECTOMY AND ADENOIDECTOMY  1964    TRIGGER POINT INJECTION      UPPER GASTROINTESTINAL ENDOSCOPY  24     Social History     Tobacco Use    Smoking status: Former     Current packs/day: 0.00     Average packs/day: 1 pack/day for 24.2 years (24.2 ttl pk-yrs)     Types: Cigarettes     Start date: 2000     Quit date: 3/22/2024     Years since quittin.0     Passive exposure: Never    Smokeless tobacco: Never    Tobacco comments:     Smoked 0.5-1 ppd; quit in 2024.    Vaping Use    Vaping status: Never Used   Substance Use Topics    Alcohol use: Not Currently     Alcohol/week: 1.0 standard drink of alcohol     Types: 1 Shots of  liquor per week     Comment: Every 2or 3months    Drug use: Never     E-Cigarette/Vaping    E-Cigarette Use Never User      E-Cigarette/Vaping Substances    Nicotine No     THC No     CBD No     Flavoring No     Other No     Unknown No        Family History: non-contributory    Meds/Allergies   Prior to Admission Medications   Prescriptions Last Dose Informant Patient Reported? Taking?   Durolane 60 MG/3ML injection  Self Yes No   Empagliflozin (Jardiance) 25 MG TABS  Self No No   Sig: Take 1 tablet (25 mg total) by mouth every morning   Multiple Vitamin (multivitamin) tablet  Self Yes No   Sig: Take 1 tablet by mouth daily   albuterol (ProAir HFA) 90 mcg/act inhaler  Self No No   Sig: Inhale 2 puffs every 6 (six) hours as needed for wheezing   amoxicillin (AMOXIL) 500 mg capsule   No No   Sig: Take 1 capsule (500 mg total) by mouth every 8 (eight) hours for 7 days   apixaban (ELIQUIS) 5 mg  Self No No   Sig: Take 1 tablet (5 mg total) by mouth 2 (two) times a day   atorvastatin (LIPITOR) 80 mg tablet  Self No No   Sig: TAKE 1 TABLET BY MOUTH EVERY DAY IN THE EVENING   clopidogrel (PLAVIX) 75 mg tablet  Self No No   Sig: Take 1 tablet (75 mg total) by mouth daily   diphenhydrAMINE (BENADRYL) 25 mg capsule  Self Yes No   Sig: Take 25 mg by mouth every 6 (six) hours as needed for itching   dofetilide (TIKOSYN) 125 mcg capsule  Self No No   Sig: Take 1 capsule (125 mcg total) by mouth every 12 (twelve) hours   ergocalciferol (VITAMIN D2) 50,000 units  Self No No   Sig: Take 1 capsule (50,000 Units total) by mouth once a week   fluticasone (FLONASE) 50 mcg/act nasal spray  Self No No   Si spray into each nostril daily   furosemide (LASIX) 20 mg tablet  Self No No   Sig: TAKE 2 TABLETS (40 MG TOTAL) BY MOUTH 2 (TWO) TIMES A DAY FOR 5 DAYS. THEN 2 TABLETS (40 MG TOTAL) IN THE AM & THEN 1 TABLET- 20 MG IN THE PM.   furosemide (LASIX) 40 mg tablet  Self No No   Sig: Take 1 tablet (40 mg total) by mouth if needed (if  worse shortness of breath or weight up 3lbs)   isosorbide mononitrate (IMDUR) 30 mg 24 hr tablet  Self No No   Sig: TAKE 1 TABLET (30 MG TOTAL) BY MOUTH 2 (TWO) TIMES A DAY 30 MG IN THE AM AND 30 MG IN THE PM   levothyroxine (Synthroid) 75 mcg tablet  Self No No   Sig: Take 1 tablet (75 mcg total) by mouth daily   meclizine (ANTIVERT) 25 mg tablet   No No   Sig: Take 1 tablet (25 mg total) by mouth every 8 (eight) hours as needed for dizziness   metoprolol succinate (TOPROL-XL) 25 mg 24 hr tablet  Self No No   Sig: Take 2 tablets (50 mg total) by mouth daily at bedtime   nitroglycerin (NITROSTAT) 0.4 mg SL tablet  Self No No   Sig: PLACE 1 TABLET UNDER THE TONGUE EVERY 5 MINUTES AS NEEDED FOR CHEST PAIN.   pantoprazole (PROTONIX) 40 mg tablet  Self No No   Sig: TAKE 1 TABLET BY MOUTH 2 TIMES A DAY BEFORE MEALS.   potassium chloride (Klor-Con M20) 20 mEq tablet  Self No No   Sig: Take 1 tablet (20 mEq total) by mouth if needed (on lasix days)   pregabalin (LYRICA) 100 mg capsule  Self No No   Sig: Take 1 capsule (100 mg total) by mouth 3 (three) times a day   sacubitril-valsartan (Entresto) 49-51 MG TABS  Self No No   Sig: Take 1 tablet by mouth 2 (two) times a day   semaglutide, 2 mg/dose, (Ozempic) 8 mg/ mL injection pen  Self No No   Sig: Inject 0.75 mL (2 mg total) under the skin every 7 days Do not start before March 17, 2025.      Facility-Administered Medications: None       Allergies   Allergen Reactions    Fish-Derived Products - Food Allergy Anaphylaxis     Cannot have all seafood products    Shellfish-Derived Products - Food Allergy Anaphylaxis    Azithromycin Hives and Rash       PHYSICAL EXAM    PE limited by: Nothing    Objective   Vitals:   First set: Temperature: 97.6 °F (36.4 °C) (04/06/25 1330)  Pulse: 87 (04/06/25 1330)  Respirations: 18 (04/06/25 1330)  Blood Pressure: 99/77 (04/06/25 1330)  SpO2: 98 % (04/06/25 1330)    Primary Survey:   (A) Airway: Intact  (B) Breathing: Spontaneous  (C)  Circulation: Pulses:   normal  (D) Disabliity:  GCS Total:  15  (E) Expose:  Completed    Secondary Survey: (Click on Physical Exam tab above)  Physical Exam  Vitals and nursing note reviewed.   Constitutional:       General: She is not in acute distress.     Appearance: She is well-developed.   HENT:      Head: Normocephalic and atraumatic.      Right Ear: External ear normal.      Left Ear: External ear normal.   Eyes:      General: No scleral icterus.        Right eye: No discharge.         Left eye: No discharge.      Extraocular Movements: Extraocular movements intact.      Conjunctiva/sclera: Conjunctivae normal.   Cardiovascular:      Rate and Rhythm: Normal rate and regular rhythm.      Heart sounds: Normal heart sounds. No murmur heard.  Pulmonary:      Effort: Pulmonary effort is normal.      Breath sounds: Normal breath sounds. No wheezing or rales.   Abdominal:      General: Bowel sounds are normal. There is no distension.      Palpations: Abdomen is soft.      Tenderness: There is no abdominal tenderness. There is no guarding or rebound.   Musculoskeletal:         General: No deformity. Normal range of motion.      Cervical back: Normal range of motion and neck supple.   Skin:     General: Skin is warm and dry.      Findings: No rash.   Neurological:      General: No focal deficit present.      Mental Status: She is alert and oriented to person, place, and time.      Cranial Nerves: No cranial nerve deficit.   Psychiatric:         Mood and Affect: Mood normal.         Behavior: Behavior normal.         Thought Content: Thought content normal.         Judgment: Judgment normal.         Cervical spine cleared by clinical criteria? No (imaging required)      Invasive Devices       Peripheral Intravenous Line  Duration             Peripheral IV 04/06/25 Left;Ventral (anterior) Forearm <1 day                    Lab Results:   Results Reviewed       Procedure Component Value Units Date/Time    HS Troponin I  2hr [[id number]]  (Normal) Collected: 04/06/25 1511    Lab Status: Final result Specimen: Blood from Arm, Left Updated: 04/06/25 1543     hs TnI 2hr 6 ng/L      Delta 2hr hsTnI 0 ng/L     HS Troponin 0hr (reflex protocol) [[id number]]  (Normal) Collected: 04/06/25 1347    Lab Status: Final result Specimen: Blood from Arm, Left Updated: 04/06/25 1423     hs TnI 0hr 6 ng/L     Comprehensive metabolic panel [[id number]] Collected: 04/06/25 1347    Lab Status: Final result Specimen: Blood from Arm, Left Updated: 04/06/25 1420     Sodium 140 mmol/L      Potassium 3.7 mmol/L      Chloride 105 mmol/L      CO2 25 mmol/L      ANION GAP 10 mmol/L      BUN 10 mg/dL      Creatinine 0.89 mg/dL      Glucose 97 mg/dL      Calcium 9.1 mg/dL      AST 20 U/L      ALT 18 U/L      Alkaline Phosphatase 46 U/L      Total Protein 7.0 g/dL      Albumin 4.3 g/dL      Total Bilirubin 0.64 mg/dL      eGFR 67 ml/min/1.73sq m     Narrative:      National Kid[employer] Disease Foundation guidelines for Chronic Kidney Disease (CKD):     Stage 1 with normal or high GFR (GFR > 90 mL/min/1.73 square meters)    Stage 2 Mild CKD (GFR = 60-89 mL/min/1.73 square meters)    Stage 3A Moderate CKD (GFR = 45-59 mL/min/1.73 square meters)    Stage 3B Moderate CKD (GFR = 30-44 mL/min/1.73 square meters)    Stage 4 Severe CKD (GFR = 15-29 mL/min/1.73 square meters)    Stage 5 End Stage CKD (GFR <15 mL/min/1.73 square meters)  Note: GFR calculation is accurate only with a steady state creatinine    CK [[id number]]  (Normal) Collected: 04/06/25 1347    Lab Status: Final result Specimen: Blood from Arm, Left Updated: 04/06/25 1420     Total CK 83 U/L     APTT [[id number]]  (Normal) Collected: 04/06/25 1347    Lab Status: Final result Specimen: Blood from Arm, Left Updated: 04/06/25 1413     PTT 30 seconds     Protime-INR [[id number]]  (Abnormal) Collected: 04/06/25 1347    Lab Status: Final result Specimen: Blood from Arm, Left Updated: 04/06/25 1413     Protime 15.7 seconds       INR 1.21    Narrative:      INR Therapeutic Range    Indication                                             INR Range      Atrial Fibrillation                                               2.0-3.0  Hypercoagulable State                                    2.0.2.3  Left Ventricular Asist Device                            2.0-3.0  Mechanical Heart Valve                                  -    Aortic(with afib, MI, embolism, HF, LA enlargement,    and/or coagulopathy)                                     2.0-3.0 (2.5-3.5)     Mitral                                                             2.5-3.5  Prosthetic/Bioprosthetic Heart Valve               2.0-3.0  Venous thromboembolism (VTE: VT, PE        2.0-3.0    CBC and differential [[id number]]  (Abnormal) Collected: 04/06/25 1347    Lab Status: Final result Specimen: Blood from Arm, Left Updated: 04/06/25 1358     WBC 6.49 Thousand/uL      RBC 4.96 Million/uL      Hemoglobin 12.9 g/dL      Hematocrit 41.4 %      MCV 84 fL      MCH 26.0 pg      MCHC 31.2 g/dL      RDW 16.0 %      MPV 9.6 fL      Platelets 296 Thousands/uL      nRBC 0 /100 WBCs      Segmented % 48 %      Immature Grans % 0 %      Lymphocytes % 30 %      Monocytes % 15 %      Eosinophils Relative 6 %      Basophils Relative 1 %      Absolute Neutrophils 3.09 Thousands/µL      Absolute Immature Grans 0.02 Thousand/uL      Absolute Lymphocytes 1.95 Thousands/µL      Absolute Monocytes 1.00 Thousand/µL      Eosinophils Absolute 0.39 Thousand/µL      Basophils Absolute 0.04 Thousands/µL                    Imaging Studies:   Direct to CT: Yes  TRAUMA - CT head wo contrast   Final Result by Yoan Lindsey MD (04/06 1413)      No acute intracranial abnormality.                  Workstation performed: [id number]         TRAUMA - CT spine cervical wo contrast   Final Result by Yoan Lindsey MD (04/06 1415)      No cervical spine fracture or traumatic malalignment.                  Workstation performed:  [id number]         TRAUMA - CT chest abdomen pelvis w contrast   Final Result by Yoan Lindsey MD (04/06 1426)      No acute traumatic CT findings.      Additional findings as above.               Workstation performed: [id number]               Procedures  ECG 12 Lead Documentation Only    Date/Time: 4/6/2025 2:20 PM    Performed by: Ankit Benjamin MD  Authorized by: Ankit Benjamin MD    ECG reviewed by me, the ED Provider: yes    Patient location:  ED  Previous ECG:     Previous ECG:  Compared to current    Similarity:  No change  Rate:     ECG rate:  70  Rhythm:     Rhythm: paced    Pacing:     Type of pacing:  Atrial           ED Course  ED Course as of 04/06/25 1544   Sun Apr 06, 2025   1348 EF by echo in October 2024 shows 40-45%   1352 Portable chest x-ray nonacute at this time.  Patient is equal bilateral breath sounds.  Chest is nontender to palpation.  Portable pelvis x-ray negative at this time.  Patient is no pain with palpation of the hips.   1421 Cervical Collar Clearance:    The patient had a CT scan of the cervical spine demonstrating no acute injury. On exam, the patient had no midline point tenderness or paresthesias/numbness/weakness in the extremities. The patient had full range of motion (was then able to flex, extend, and rotate head laterally) without pain. There were no distracting injuries and the patient was not intoxicated.      The patient's cervical spine was cleared radiologically and clinically. Cervical collar removed at this time.     Ankit Benjamin MD  4/6/2025 2:21 PM        1424 Pressures currently 127/64 with a heart rate of 77.   1523 Tolerated p.o.           Medical Decision Making  Amount and/or Complexity of Data Reviewed  Labs: ordered.  Radiology: ordered.    Risk  Prescription drug management.                Disposition  [facility] Transfer: No  Final diagnoses:   Injury of head, initial encounter   Nausea & vomiting     Time reflects when diagnosis was  documented in both MDM as applicable and the Disposition within this note       Time User Action Codes Description Comment    4/6/2025  3:23 PM Ankit Benjamin [S09.90XA] Injury of head, initial encounter     4/6/2025  3:23 PM Ankit Benjamin Add [R11.2] Nausea & vomiting           ED Disposition       ED Disposition   Discharge    Condition   Stable    Date/Time   Sun Apr 6, 2025  3:23 PM    Comment   Vesna Langston discharge to home/self care.                   Follow-up Information       Follow up With Specialties Details Why Contact Info    Melissa Montoya DO Family Medicine Call in 2 days  [address] [facility] [address] [phone]            Patient's Medications   Discharge Prescriptions    TRIMETHOBENZAMIDE (TIGAN) 300 MG CAPSULE    Take 1 capsule (300 mg total) by mouth 3 (three) times a day as needed for nausea for up to 10 doses       Start Date: 4/6/2025  End Date: --       Order Dose: 300 mg       Quantity: 10 capsule    Refills: 0     No discharge procedures on file.    PDMP Review         Value Time User    PDMP Reviewed  Yes 1/30/2025 11:25 AM Melissa Montoya DO            ED Provider  Electronically Signed by           Ankit Benjamin MD  04/06/25 0366

## 2025-04-06 NOTE — DISCHARGE INSTRUCTIONS
"Patient Education     Head injury in adults   The Basics   Written by the doctors and editors at [facility], [place]   What causes head injuries? -- A head injury can happen when a person hits their head on a hard surface or is hit in the head with something. The most common causes of head injury are falls, sports injuries, and car and bike accidents.  Some head injuries are minor, like a bump on the head. With minor head injuries, the skull protects the brain from damage. Others can be more serious and cause brain injury. A \"concussion\" is the medical term for a mild brain injury. Sometimes, head injuries can be serious or life-threatening.  A more serious head injury can cause:   Broken bone of the skull or face (figure 1)   Brain injury or swelling   Bleeding in or around the brain  This article discusses head injuries in people 18 years and older. Head injuries in children might be managed differently.  What are the symptoms of a head injury? -- Symptoms depend on the type of injury a person has and how severe it is. People with a minor head injury, such as a bump on the head, might not have any symptoms.  Some people pass out or lose consciousness when they get a head injury. If a person does not wake up quickly, or blacks out several minutes or hours after a head injury, they might have bleeding in the brain. The person needs emergency help.  Other symptoms that can happen after a head injury include:   Headache   Nausea or vomiting   Swelling, bleeding, or bruising on the scalp   Dizziness   Confusion or memory problems   Vision problems   Feeling tired or sleepy   Mood or behavior changes, or not acting like yourself   Trouble walking or talking   Seizures - Seizures are waves of abnormal electrical activity in the brain. They can make you pass out, or move or behave strangely.  A head injury that involves a broken skull or face bone can also cause:   Bruising around the eyes or behind the ear   Blood or clear fluid " draining from the nose or ear  Symptoms can start right after a head injury, or a few hours or days later.  Some people have symptoms that last a short time only. Other people have symptoms that cause long-lasting problems.  Will I need tests? -- It depends on your injury and symptoms. Your doctor or nurse will ask about your symptoms and do an exam. They will also ask questions to find out if you are able to think clearly.  If your doctor or nurse thinks that you might have a serious injury, they might order an imaging test of your brain. This might be a CT or MRI scan. These tests create pictures of your skull and brain.  How are head injuries treated? -- Treatment depends on your injury and how serious it is.  Usually, minor head injuries do not need treatment. But your doctor might recommend things like:   Having someone stay with you for 24 hours after your injury - This person should watch for new symptoms or the symptoms listed above. They should also make sure that you can wake up at a normal time after you fall asleep. It is not usually necessary to wake you up during the night.   Taking over-the-counter pain medicines - Acetaminophen (sample brand name: Tylenol) might help relieve a headache.   Rest - It can be important to rest if you have symptoms after a concussion. This means resting your body and avoiding activities that make you feel worse. It can also help to rest your brain. Avoid looking at screens until you are feeling better.   Ice - If you bumped your head, ice can help with pain and swelling. Apply a cold gel pack, bag of ice, or bag of frozen vegetables on the area every 1 to 2 hours, for 15 minutes each time. Put a thin towel between the ice (or other cold object) and your head. Use the ice (or other cold object) for at least 6 hours after your injury.  Severe head injuries need to be treated in the hospital. In this case, treatment can include:   Medicines - Different medicines can help  prevent brain swelling, bleeding in the brain, or seizures.   Surgery - If you have bleeding in or around your brain, or if your brain swells, you might need surgery.  When should I call for help? -- If you had a head injury, there are certain problems that you or your caregiver should watch for.  Someone should call for an ambulance (in the [place] and [place], call 9-1-1) if you:   Cannot be fully woken up   Are acting confused or disoriented   Have a sudden and persistent change in your behavior   Cannot walk normally   Have trouble speaking or slurred speech   Have severe weakness or cannot move an arm, leg, or 1 side of your face   Have a seizure, or jerking of your arms or legs you cannot control  Call the doctor or nurse for advice if you:   Have trouble concentrating, thinking clearly, or remembering things   Have trouble waking from sleep or staying awake   Have nausea or vomiting that is not improving   Have blurry eyesight, double vision, or other problems seeing   Have blood or clear liquid draining from your ears or nose   Feel dizzy or faint   Seem weak or have numbness in an arm, leg, or other body part   Have a stiff neck   Have a headache that is severe, gets worse, feels different, or does not get better with over-the-counter medicines  If any of the above symptoms seem severe, or if you are concerned but cannot reach the doctor or nurse, seek emergency help. These things don't always mean there is a serious problem, but seeing a doctor or nurse is the only way to know for sure.  When can I play sports or do my usual activities again? -- Ask your doctor when you can play sports or do your usual activities again. It depends on your injury and symptoms.  How can head injuries be prevented? -- To help prevent another head injury, you should wear a helmet when you ride a bike or motorcycle, or when you play sports where you could hit your head. You should also wear a seat belt every time you drive or ride in  a car.  All topics are updated as new evidence becomes available and our peer review process is complete.  This topic retrieved from [employer] on: Mar 06, 2024.  Topic 81729 Version 13.0  Release: 32.2.4 - C32.64  © 2024 UpToDate, Inc. and/or its affiliates. All rights reserved.  figure 1: Bones of the skull and face     Graphic 58123 Version 2.0  Consumer Information Use and Disclaimer   Disclaimer: This generalized information is a limited summary of diagnosis, treatment, and/or medication information. It is not meant to be comprehensive and should be used as a tool to help the user understand and/or assess potential diagnostic and treatment options. It does NOT include all information about conditions, treatments, medications, side effects, or risks that may apply to a specific patient. It is not intended to be medical advice or a substitute for the medical advice, diagnosis, or treatment of a health care provider based on the health care provider's examination and assessment of a patient's specific and unique circumstances. Patients must speak with a health care provider for complete information about their health, medical questions, and treatment options, including any risks or benefits regarding use of medications. This information does not endorse any treatments or medications as safe, effective, or approved for treating a specific patient. UpToDate, Inc. and its affiliates disclaim any warranty or liability relating to this information or the use thereof.The use of this information is governed by the Terms of Use, available at https://www.[employer]su[employer].com/en/know/clinical-effectiveness-terms. 2024© UpToDate, Inc. and its affiliates and/or licensors. All rights reserved.  Copyright   © 2024 UpToDate, Inc. and/or its affiliates. All rights reserved.  Patient Education     Nausea and Vomiting, Adult ED   General Information   You came to the Emergency Department (ED) for nausea and vomiting. When you feel sick  to your stomach, this is nausea. When you throw up, this is vomiting. Often, nausea and vomiting are caused by a virus. But they can also be caused by more serious things like an infection around the brain. The staff felt the risk of a serious cause for your nausea and vomiting is low.  If a virus is causing your nausea and vomiting, it is easy to spread from person to person. You can lower this risk by washing your hands often. Most of the time, your symptoms will go away without treatment in a few days.  You may be waiting on some test results. The staff will contact you if there are concerning results.  What care is needed at home?   Call your regular doctor to let them know you were in the ED. Make a follow-up appointment if you were told to.  Drink small amounts of fluid every 15 to 30 minutes. Good fluids to drink are water, broth, and oral electrolyte solutions. Sugar-free or very low sugar sports drinks are also OK.  Once you feel you can eat, start with crackers, toast, or cereal. Then eat small amounts of food more often.  Wash your hands often. This will help keep others healthy.  Avoid alcohol and caffeine.  If you have diabetes, check your blood sugar more often than usual. Being sick can affect your blood sugar levels.  When do I need to get emergency help?   Call for an ambulance right away if:   You have vomiting along with a fever and severe headache or stiff neck.  You have vomiting along with severe chest or belly pain or trouble breathing.  You are vomiting large amounts of blood (more than 1 teaspoon or 5 mL).  Return to the ED if:   You have signs of severe fluid loss, such as:  No urine for more than 8 hours.  Feel very light-headed or like you are going to pass out.  Feel weak like you are going to fall.  Feel like your heart is beating very fast.  You feel extremely weak, like you are going to pass out.  You are vomiting multiple times every hour.  When do I need to call the doctor?   You  develop early signs of fluid loss, such as:  Dark-colored urine.  Dry mouth.  Muscle cramps.  Lack of energy.  Feeling light-headed when you get up.  You have a small amount of blood (less than 1 teaspoon or 5 mL) in your vomit or bowel movements.  You throw up something that looks like coffee grounds.  You have a bowel movement that is black and looks like tar.  You are not able to keep fluids down.  You have a fever of 100.4º F (38°C) or higher or chills that do not go away after a day.  You have new or worsening symptoms.  Last Reviewed Date   2021-05-21  Consumer Information Use and Disclaimer   This generalized information is a limited summary of diagnosis, treatment, and/or medication information. It is not meant to be comprehensive and should be used as a tool to help the user understand and/or assess potential diagnostic and treatment options. It does NOT include all information about conditions, treatments, medications, side effects, or risks that may apply to a specific patient. It is not intended to be medical advice or a substitute for the medical advice, diagnosis, or treatment of a health care provider based on the health care provider's examination and assessment of a patient’s specific and unique circumstances. Patients must speak with a health care provider for complete information about their health, medical questions, and treatment options, including any risks or benefits regarding use of medications. This information does not endorse any treatments or medications as safe, effective, or approved for treating a specific patient. UpToDate, Inc. and its affiliates disclaim any warranty or liability relating to this information or the use thereof. The use of this information is governed by the Terms of Use, available at https://www.[employer]uwer.com/en/know/clinical-effectiveness-terms   Copyright   Copyright © 2024 UpToDate, Inc. and its affiliates and/or licensors. All rights reserved.

## 2025-04-07 ENCOUNTER — TELEPHONE (OUTPATIENT)
Age: 65
End: 2025-04-07

## 2025-04-07 LAB
ATRIAL RATE: 70 BPM
P AXIS: 40 DEGREES
PR INTERVAL: 190 MS
QRS AXIS: 45 DEGREES
QRSD INTERVAL: 86 MS
QT INTERVAL: 432 MS
QTC INTERVAL: 466 MS
T WAVE AXIS: 68 DEGREES
VENTRICULAR RATE: 70 BPM

## 2025-04-07 PROCEDURE — 93010 ELECTROCARDIOGRAM REPORT: CPT | Performed by: INTERNAL MEDICINE

## 2025-04-07 NOTE — TELEPHONE ENCOUNTER
Patient said she is scheduled for upcoming general dental visit. Her dentist advised her to ask her cardiologist if she requires pre dental antibiotic.    Please advise.

## 2025-04-08 ENCOUNTER — APPOINTMENT (OUTPATIENT)
Dept: LAB | Facility: HOSPITAL | Age: 65
End: 2025-04-08
Payer: OTHER MISCELLANEOUS

## 2025-04-08 ENCOUNTER — LAB REQUISITION (OUTPATIENT)
Dept: LAB | Facility: HOSPITAL | Age: 65
End: 2025-04-08
Payer: COMMERCIAL

## 2025-04-08 ENCOUNTER — OFFICE VISIT (OUTPATIENT)
Dept: LAB | Facility: HOSPITAL | Age: 65
End: 2025-04-08
Payer: OTHER MISCELLANEOUS

## 2025-04-08 DIAGNOSIS — Z98.1 HISTORY OF LUMBAR SPINAL FUSION: ICD-10-CM

## 2025-04-08 DIAGNOSIS — Z01.818 PRE-OP EVALUATION: ICD-10-CM

## 2025-04-08 DIAGNOSIS — M54.16 RADICULOPATHY, LUMBAR REGION: ICD-10-CM

## 2025-04-08 DIAGNOSIS — Z98.1 ARTHRODESIS STATUS: ICD-10-CM

## 2025-04-08 DIAGNOSIS — Z01.818 ENCOUNTER FOR OTHER PREPROCEDURAL EXAMINATION: ICD-10-CM

## 2025-04-08 LAB
ABO GROUP BLD: NORMAL
ALBUMIN SERPL BCG-MCNC: 4.3 G/DL (ref 3.5–5)
ALP SERPL-CCNC: 45 U/L (ref 34–104)
ALT SERPL W P-5'-P-CCNC: 19 U/L (ref 7–52)
ANION GAP SERPL CALCULATED.3IONS-SCNC: 8 MMOL/L (ref 4–13)
APTT PPP: 29 SECONDS (ref 23–34)
AST SERPL W P-5'-P-CCNC: 19 U/L (ref 13–39)
ATRIAL RATE: 78 BPM
BASOPHILS # BLD AUTO: 0.01 THOUSANDS/ÂΜL (ref 0–0.1)
BASOPHILS NFR BLD AUTO: 0 % (ref 0–1)
BILIRUB SERPL-MCNC: 0.68 MG/DL (ref 0.2–1)
BLD GP AB SCN SERPL QL: NEGATIVE
BUN SERPL-MCNC: 9 MG/DL (ref 5–25)
CALCIUM SERPL-MCNC: 9 MG/DL (ref 8.4–10.2)
CHLORIDE SERPL-SCNC: 108 MMOL/L (ref 96–108)
CO2 SERPL-SCNC: 24 MMOL/L (ref 21–32)
CREAT SERPL-MCNC: 0.82 MG/DL (ref 0.6–1.3)
EOSINOPHIL # BLD AUTO: 0.34 THOUSAND/ÂΜL (ref 0–0.61)
EOSINOPHIL NFR BLD AUTO: 4 % (ref 0–6)
ERYTHROCYTE [DISTWIDTH] IN BLOOD BY AUTOMATED COUNT: 16.2 % (ref 11.6–15.1)
GFR SERPL CREATININE-BSD FRML MDRD: 74 ML/MIN/1.73SQ M
GLUCOSE P FAST SERPL-MCNC: 108 MG/DL (ref 65–99)
HCT VFR BLD AUTO: 41.2 % (ref 34.8–46.1)
HGB BLD-MCNC: 12.9 G/DL (ref 11.5–15.4)
IMM GRANULOCYTES # BLD AUTO: 0.01 THOUSAND/UL (ref 0–0.2)
IMM GRANULOCYTES NFR BLD AUTO: 0 % (ref 0–2)
INR PPP: 1.16 (ref 0.85–1.19)
LYMPHOCYTES # BLD AUTO: 1.69 THOUSANDS/ÂΜL (ref 0.6–4.47)
LYMPHOCYTES NFR BLD AUTO: 21 % (ref 14–44)
MCH RBC QN AUTO: 26.7 PG (ref 26.8–34.3)
MCHC RBC AUTO-ENTMCNC: 31.3 G/DL (ref 31.4–37.4)
MCV RBC AUTO: 85 FL (ref 82–98)
MONOCYTES # BLD AUTO: 0.99 THOUSAND/ÂΜL (ref 0.17–1.22)
MONOCYTES NFR BLD AUTO: 12 % (ref 4–12)
NEUTROPHILS # BLD AUTO: 4.98 THOUSANDS/ÂΜL (ref 1.85–7.62)
NEUTS SEG NFR BLD AUTO: 63 % (ref 43–75)
NRBC BLD AUTO-RTO: 0 /100 WBCS
P AXIS: 68 DEGREES
PLATELET # BLD AUTO: 301 THOUSANDS/UL (ref 149–390)
PMV BLD AUTO: 9.9 FL (ref 8.9–12.7)
POTASSIUM SERPL-SCNC: 4 MMOL/L (ref 3.5–5.3)
PR INTERVAL: 154 MS
PROT SERPL-MCNC: 6.8 G/DL (ref 6.4–8.4)
PROTHROMBIN TIME: 15.2 SECONDS (ref 12.3–15)
QRS AXIS: 45 DEGREES
QRSD INTERVAL: 84 MS
QT INTERVAL: 452 MS
QTC INTERVAL: 515 MS
RBC # BLD AUTO: 4.84 MILLION/UL (ref 3.81–5.12)
RH BLD: POSITIVE
SODIUM SERPL-SCNC: 140 MMOL/L (ref 135–147)
SPECIMEN EXPIRATION DATE: NORMAL
T WAVE AXIS: 85 DEGREES
VENTRICULAR RATE: 78 BPM
WBC # BLD AUTO: 8.02 THOUSAND/UL (ref 4.31–10.16)

## 2025-04-08 PROCEDURE — 85025 COMPLETE CBC W/AUTO DIFF WBC: CPT

## 2025-04-08 PROCEDURE — 86850 RBC ANTIBODY SCREEN: CPT

## 2025-04-08 PROCEDURE — 80053 COMPREHEN METABOLIC PANEL: CPT

## 2025-04-08 PROCEDURE — 93005 ELECTROCARDIOGRAM TRACING: CPT

## 2025-04-08 PROCEDURE — 85610 PROTHROMBIN TIME: CPT

## 2025-04-08 PROCEDURE — 93010 ELECTROCARDIOGRAM REPORT: CPT | Performed by: INTERNAL MEDICINE

## 2025-04-08 PROCEDURE — 36415 COLL VENOUS BLD VENIPUNCTURE: CPT

## 2025-04-08 PROCEDURE — 86901 BLOOD TYPING SEROLOGIC RH(D): CPT

## 2025-04-08 PROCEDURE — 86900 BLOOD TYPING SEROLOGIC ABO: CPT

## 2025-04-08 PROCEDURE — 85730 THROMBOPLASTIN TIME PARTIAL: CPT

## 2025-04-09 ENCOUNTER — CONSULT (OUTPATIENT)
Dept: FAMILY MEDICINE CLINIC | Facility: CLINIC | Age: 65
End: 2025-04-09
Payer: COMMERCIAL

## 2025-04-09 VITALS
HEART RATE: 76 BPM | BODY MASS INDEX: 32.72 KG/M2 | DIASTOLIC BLOOD PRESSURE: 53 MMHG | WEIGHT: 215.17 LBS | SYSTOLIC BLOOD PRESSURE: 94 MMHG | OXYGEN SATURATION: 98 %

## 2025-04-09 DIAGNOSIS — E78.2 MIXED HYPERLIPIDEMIA: Primary | ICD-10-CM

## 2025-04-09 DIAGNOSIS — I25.10 CORONARY ARTERY DISEASE INVOLVING NATIVE CORONARY ARTERY OF NATIVE HEART WITHOUT ANGINA PECTORIS: Chronic | ICD-10-CM

## 2025-04-09 DIAGNOSIS — E11.9 TYPE 2 DIABETES MELLITUS WITHOUT COMPLICATION, WITHOUT LONG-TERM CURRENT USE OF INSULIN (HCC): ICD-10-CM

## 2025-04-09 PROBLEM — R55 SYNCOPE: Status: RESOLVED | Noted: 2024-04-01 | Resolved: 2025-04-09

## 2025-04-09 PROBLEM — N18.2 STAGE 2 CHRONIC KIDNEY DISEASE: Status: ACTIVE | Noted: 2025-01-03

## 2025-04-09 LAB

## 2025-04-09 PROCEDURE — 99214 OFFICE O/P EST MOD 30 MIN: CPT | Performed by: FAMILY MEDICINE

## 2025-04-09 NOTE — LETTER
April 9, 2025     Charles Mcrae MD  [address] [place] [address]  [address] Floor  [place] [address]    Patient: Vesna Langston   YOB: 1960   Date of Visit: 4/9/2025       Dear Dr. Charles Mcrae MD:    Thank you for referring Vesna Langston to me for evaluation. Below are my notes for this consultation.    If you have questions, please do not hesitate to call me. I look forward to following your patient along with you.         Sincerely,        Melissa Montoya DO        CC: No Recipients    Melissa Montoya DO  4/9/2025  9:21 AM  Sign when Signing Visit  PRE-OPERATIVE EXAMINATION  Vesna Langston  1960    Vesna Langston is a 65 y.o. female with PMH of CAD, Aflutter, HFrEF, T2DM, hypothyroid, POP, obesity who is planning to undergo Navigated revision L3-S1 laminectomy/decompression, revision L3-S1 posterior instrumented spinal fusion with TLIF (Bilateral: Spine Lumbar) under general by Dr. Mcrae on 5/1/25. The procedure is indicated for the following condition: History of lumbar spinal fusion, lumbar region radiculopathy. Patient has not had complications with anesthesia in the past.    ROS:   Chest pain: no   Shortness of breath: no  Shortness of breath with exertion: no  Orthopnea: no  Dizziness: Yes - had an episode of dizziness on Sunday and went to Er - this was likely related to higher dose increase of her Ozempic recently. She improved in the ER with anti-nausea medication.   Unexplained weight change: no    PMH:  CAD: yes  HTN: no  CKD: yes, stage 2   DM: yes on insulin: no  History of CVA: no    She  reports that she quit smoking about 12 months ago. Her smoking use included cigarettes. She started smoking about 25 years ago. She has a 24.2 pack-year smoking history. She has never been exposed to tobacco smoke. She has never used smokeless tobacco. She reports that she does not currently use alcohol after a past usage of about 1.0 standard drink of alcohol per week. She reports that she does not use  drugs.    BP 94/53 (BP Location: Right arm, Patient Position: Sitting, Cuff Size: Standard)   Pulse 76   Wt 97.6 kg (215 lb 2.7 oz)   SpO2 98%   BMI 32.72 kg/m²   Physical Exam  Vitals reviewed.   Constitutional:       General: She is not in acute distress.     Appearance: She is obese. She is not ill-appearing.      Comments: Ambulates with cane   HENT:      Head: Normocephalic and atraumatic.   Neck:      Vascular: No carotid bruit.   Cardiovascular:      Rate and Rhythm: Normal rate and regular rhythm.      Heart sounds: Normal heart sounds.   Pulmonary:      Effort: Pulmonary effort is normal.      Breath sounds: Normal breath sounds.   Musculoskeletal:      Cervical back: Neck supple.      Right lower leg: No edema.      Left lower leg: No edema.   Skin:     General: Skin is warm.   Neurological:      General: No focal deficit present.      Mental Status: She is alert.   Psychiatric:         Mood and Affect: Mood normal.         Behavior: Behavior normal.         Revised Cardiac Risk Index (RCRI) for Pre-Operative Risk   (estimates risk of cardiac complications after noncardiac surgery)    High-risk surgery: No 0  Intraperitoneal, intrathoracic, suprainguinal vascular  History of ischemic heart disease: Yes +1  Hx of MI, (+) exercise test, current chest pain considered due to myocardial ischemia, use of nitrate therapy or ECG with pathological Q waves)  History of CHF: Yes +1  Pulmonary edema, B/L rales or S3 gallop; TAMAYO, orthopnea, PND, CXR showing pulmonary vascular redistribution)  History of cerebrovascular disease: No 0   Prior TIA or stroke  Pre-operative treatment with insulin: No 0  Pre-operative creatinine >2 mg/dL: No 0     RCRI Scorin points: Class III Risk, 10.1% 30-day risk of death, MI, or cardiac arrest    Lab Results   Component Value Date    CREATININE 0.82 2025       Vesna was seen today for pre-op exam.    Diagnoses and all orders for this visit:    Mixed hyperlipidemia  -      semaglutide, 1 mg/dose, (Ozempic) 4 mg/3 mL injection pen; Inject 0.75 mL (1 mg total) under the skin once a week    Type 2 diabetes mellitus without complication, without long-term current use of insulin (HCC)  -     semaglutide, 1 mg/dose, (Ozempic) 4 mg/3 mL injection pen; Inject 0.75 mL (1 mg total) under the skin once a week    Coronary artery disease involving native coronary artery of native heart without angina pectoris  -     semaglutide, 1 mg/dose, (Ozempic) 4 mg/3 mL injection pen; Inject 0.75 mL (1 mg total) under the skin once a week        Recommendations:  Vesna Langston is undergoing an elective Low Risk surgery, Navigated revision L3-S1 laminectomy/decompression, revision L3-S1 posterior instrumented spinal fusion with TLIF (Bilateral: Spine Lumbar). She is RCRI class III risk (2 points for CHF and CAD history) with 10.1% 30-day risk of death, MI, or cardiac arrest.     Cardiology clearance note reviewed from 4/4/2025. EKG, CBC, Bmp reviewed     She may proceed with surgery as planned without further workup.     Refill of Ozempic provided for patient -will decrease back to 1mg weekly as she did not tolerate the 2mg dosage, patient aware she must hold medication for 2 weeks prior to surgery.

## 2025-04-09 NOTE — PROGRESS NOTES
PRE-OPERATIVE EXAMINATION  Vesna Langston  1960    Vesna Langston is a 65 y.o. female with PMH of CAD, Aflutter, HFrEF, T2DM, hypothyroid, POP, obesity who is planning to undergo Navigated revision L3-S1 laminectomy/decompression, revision L3-S1 posterior instrumented spinal fusion with TLIF (Bilateral: Spine Lumbar) under general by Dr. Mcrae on 5/1/25. The procedure is indicated for the following condition: History of lumbar spinal fusion, lumbar region radiculopathy. Patient has not had complications with anesthesia in the past.    ROS:   Chest pain: no   Shortness of breath: no  Shortness of breath with exertion: no  Orthopnea: no  Dizziness: Yes - had an episode of dizziness on Sunday and went to Er - this was likely related to higher dose increase of her Ozempic recently. She improved in the ER with anti-nausea medication.   Unexplained weight change: no    PMH:  CAD: yes  HTN: no  CKD: yes, stage 2   DM: yes on insulin: no  History of CVA: no    She  reports that she quit smoking about 12 months ago. Her smoking use included cigarettes. She started smoking about 25 years ago. She has a 24.2 pack-year smoking history. She has never been exposed to tobacco smoke. She has never used smokeless tobacco. She reports that she does not currently use alcohol after a past usage of about 1.0 standard drink of alcohol per week. She reports that she does not use drugs.    BP 94/53 (BP Location: Right arm, Patient Position: Sitting, Cuff Size: Standard)   Pulse 76   Wt 97.6 kg (215 lb 2.7 oz)   SpO2 98%   BMI 32.72 kg/m²   Physical Exam  Vitals reviewed.   Constitutional:       General: She is not in acute distress.     Appearance: She is obese. She is not ill-appearing.      Comments: Ambulates with cane   HENT:      Head: Normocephalic and atraumatic.   Neck:      Vascular: No carotid bruit.   Cardiovascular:      Rate and Rhythm: Normal rate and regular rhythm.      Heart sounds: Normal heart sounds.   Pulmonary:       Effort: Pulmonary effort is normal.      Breath sounds: Normal breath sounds.   Musculoskeletal:      Cervical back: Neck supple.      Right lower leg: No edema.      Left lower leg: No edema.   Skin:     General: Skin is warm.   Neurological:      General: No focal deficit present.      Mental Status: She is alert.   Psychiatric:         Mood and Affect: Mood normal.         Behavior: Behavior normal.         Revised Cardiac Risk Index (RCRI) for Pre-Operative Risk   (estimates risk of cardiac complications after noncardiac surgery)    High-risk surgery: No 0  Intraperitoneal, intrathoracic, suprainguinal vascular  History of ischemic heart disease: Yes +1  Hx of MI, (+) exercise test, current chest pain considered due to myocardial ischemia, use of nitrate therapy or ECG with pathological Q waves)  History of CHF: Yes +1  Pulmonary edema, B/L rales or S3 gallop; TAMAYO, orthopnea, PND, CXR showing pulmonary vascular redistribution)  History of cerebrovascular disease: No 0   Prior TIA or stroke  Pre-operative treatment with insulin: No 0  Pre-operative creatinine >2 mg/dL: No 0     RCRI Scorin points: Class III Risk, 10.1% 30-day risk of death, MI, or cardiac arrest    Lab Results   Component Value Date    CREATININE 0.82 2025       Vesna was seen today for pre-op exam.    Diagnoses and all orders for this visit:    Mixed hyperlipidemia  -     semaglutide, 1 mg/dose, (Ozempic) 4 mg/3 mL injection pen; Inject 0.75 mL (1 mg total) under the skin once a week    Type 2 diabetes mellitus without complication, without long-term current use of insulin (HCC)  -     semaglutide, 1 mg/dose, (Ozempic) 4 mg/3 mL injection pen; Inject 0.75 mL (1 mg total) under the skin once a week    Coronary artery disease involving native coronary artery of native heart without angina pectoris  -     semaglutide, 1 mg/dose, (Ozempic) 4 mg/3 mL injection pen; Inject 0.75 mL (1 mg total) under the skin once a  week        Recommendations:  Vesna Langston is undergoing an elective Low Risk surgery, Navigated revision L3-S1 laminectomy/decompression, revision L3-S1 posterior instrumented spinal fusion with TLIF (Bilateral: Spine Lumbar). She is RCRI class III risk (2 points for CHF and CAD history) with 10.1% 30-day risk of death, MI, or cardiac arrest.     Cardiology clearance note reviewed from 4/4/2025. EKG, CBC, Bmp reviewed     She may proceed with surgery as planned without further workup.     Refill of Ozempic provided for patient -will decrease back to 1mg weekly as she did not tolerate the 2mg dosage, patient aware she must hold medication for 2 weeks prior to surgery.

## 2025-04-10 ENCOUNTER — TELEPHONE (OUTPATIENT)
Age: 65
End: 2025-04-10

## 2025-04-10 ENCOUNTER — HOSPITAL ENCOUNTER (EMERGENCY)
Facility: HOSPITAL | Age: 65
Discharge: HOME | End: 2025-04-10
Attending: EMERGENCY MEDICINE
Payer: COMMERCIAL

## 2025-04-10 ENCOUNTER — OFFICE VISIT (OUTPATIENT)
Dept: PAIN MEDICINE | Facility: CLINIC | Age: 65
End: 2025-04-10
Payer: OTHER MISCELLANEOUS

## 2025-04-10 VITALS — BODY MASS INDEX: 32.28 KG/M2 | WEIGHT: 213 LBS | HEIGHT: 68 IN

## 2025-04-10 VITALS
OXYGEN SATURATION: 97 % | RESPIRATION RATE: 18 BRPM | SYSTOLIC BLOOD PRESSURE: 104 MMHG | HEART RATE: 70 BPM | TEMPERATURE: 98.4 F | DIASTOLIC BLOOD PRESSURE: 53 MMHG

## 2025-04-10 DIAGNOSIS — R11.2 NAUSEA & VOMITING: ICD-10-CM

## 2025-04-10 DIAGNOSIS — R11.2 NAUSEA AND VOMITING: ICD-10-CM

## 2025-04-10 DIAGNOSIS — R42 DIZZINESS: Primary | ICD-10-CM

## 2025-04-10 DIAGNOSIS — M47.26 OTHER SPONDYLOSIS WITH RADICULOPATHY, LUMBAR REGION: Primary | ICD-10-CM

## 2025-04-10 LAB
ALBUMIN SERPL BCG-MCNC: 4.3 G/DL (ref 3.5–5)
ALP SERPL-CCNC: 46 U/L (ref 34–104)
ALT SERPL W P-5'-P-CCNC: 27 U/L (ref 7–52)
ANION GAP SERPL CALCULATED.3IONS-SCNC: 11 MMOL/L (ref 4–13)
APTT PPP: 30 SECONDS (ref 23–34)
AST SERPL W P-5'-P-CCNC: 29 U/L (ref 13–39)
BASOPHILS # BLD AUTO: 0.05 THOUSANDS/ÂΜL (ref 0–0.1)
BASOPHILS NFR BLD AUTO: 1 % (ref 0–1)
BILIRUB SERPL-MCNC: 0.68 MG/DL (ref 0.2–1)
BUN SERPL-MCNC: 10 MG/DL (ref 5–25)
CALCIUM SERPL-MCNC: 9.2 MG/DL (ref 8.4–10.2)
CARDIAC TROPONIN I PNL SERPL HS: 4 NG/L (ref ?–50)
CHLORIDE SERPL-SCNC: 108 MMOL/L (ref 96–108)
CO2 SERPL-SCNC: 20 MMOL/L (ref 21–32)
CREAT SERPL-MCNC: 0.82 MG/DL (ref 0.6–1.3)
EOSINOPHIL # BLD AUTO: 0.3 THOUSAND/ÂΜL (ref 0–0.61)
EOSINOPHIL NFR BLD AUTO: 3 % (ref 0–6)
ERYTHROCYTE [DISTWIDTH] IN BLOOD BY AUTOMATED COUNT: 15.9 % (ref 11.6–15.1)
GFR SERPL CREATININE-BSD FRML MDRD: 74 ML/MIN/1.73SQ M
GLUCOSE SERPL-MCNC: 119 MG/DL (ref 65–140)
HCT VFR BLD AUTO: 44.1 % (ref 34.8–46.1)
HGB BLD-MCNC: 13.5 G/DL (ref 11.5–15.4)
IMM GRANULOCYTES # BLD AUTO: 0.03 THOUSAND/UL (ref 0–0.2)
IMM GRANULOCYTES NFR BLD AUTO: 0 % (ref 0–2)
INR PPP: 1.11 (ref 0.85–1.19)
LACTATE SERPL-SCNC: 1.5 MMOL/L (ref 0.5–2)
LYMPHOCYTES # BLD AUTO: 1.68 THOUSANDS/ÂΜL (ref 0.6–4.47)
LYMPHOCYTES NFR BLD AUTO: 17 % (ref 14–44)
MAGNESIUM SERPL-MCNC: 2.1 MG/DL (ref 1.9–2.7)
MCH RBC QN AUTO: 25.8 PG (ref 26.8–34.3)
MCHC RBC AUTO-ENTMCNC: 30.6 G/DL (ref 31.4–37.4)
MCV RBC AUTO: 84 FL (ref 82–98)
MONOCYTES # BLD AUTO: 0.96 THOUSAND/ÂΜL (ref 0.17–1.22)
MONOCYTES NFR BLD AUTO: 10 % (ref 4–12)
NEUTROPHILS # BLD AUTO: 6.61 THOUSANDS/ÂΜL (ref 1.85–7.62)
NEUTS SEG NFR BLD AUTO: 69 % (ref 43–75)
NRBC BLD AUTO-RTO: 0 /100 WBCS
PLATELET # BLD AUTO: 342 THOUSANDS/UL (ref 149–390)
PMV BLD AUTO: 9.8 FL (ref 8.9–12.7)
POTASSIUM SERPL-SCNC: 4.2 MMOL/L (ref 3.5–5.3)
PROT SERPL-MCNC: 7.4 G/DL (ref 6.4–8.4)
PROTHROMBIN TIME: 14.7 SECONDS (ref 12.3–15)
RBC # BLD AUTO: 5.23 MILLION/UL (ref 3.81–5.12)
SODIUM SERPL-SCNC: 139 MMOL/L (ref 135–147)
TSH SERPL DL<=0.05 MIU/L-ACNC: 3.75 UIU/ML (ref 0.45–4.5)
WBC # BLD AUTO: 9.63 THOUSAND/UL (ref 4.31–10.16)

## 2025-04-10 PROCEDURE — 83605 ASSAY OF LACTIC ACID: CPT | Performed by: PHYSICIAN ASSISTANT

## 2025-04-10 PROCEDURE — 85730 THROMBOPLASTIN TIME PARTIAL: CPT | Performed by: PHYSICIAN ASSISTANT

## 2025-04-10 PROCEDURE — 96372 THER/PROPH/DIAG INJ SC/IM: CPT

## 2025-04-10 PROCEDURE — 85025 COMPLETE CBC W/AUTO DIFF WBC: CPT | Performed by: PHYSICIAN ASSISTANT

## 2025-04-10 PROCEDURE — 84443 ASSAY THYROID STIM HORMONE: CPT | Performed by: PHYSICIAN ASSISTANT

## 2025-04-10 PROCEDURE — 36415 COLL VENOUS BLD VENIPUNCTURE: CPT | Performed by: PHYSICIAN ASSISTANT

## 2025-04-10 PROCEDURE — 85610 PROTHROMBIN TIME: CPT | Performed by: PHYSICIAN ASSISTANT

## 2025-04-10 PROCEDURE — 99214 OFFICE O/P EST MOD 30 MIN: CPT | Performed by: ANESTHESIOLOGY

## 2025-04-10 PROCEDURE — 80053 COMPREHEN METABOLIC PANEL: CPT | Performed by: PHYSICIAN ASSISTANT

## 2025-04-10 PROCEDURE — 96361 HYDRATE IV INFUSION ADD-ON: CPT

## 2025-04-10 PROCEDURE — 96374 THER/PROPH/DIAG INJ IV PUSH: CPT

## 2025-04-10 PROCEDURE — 93005 ELECTROCARDIOGRAM TRACING: CPT

## 2025-04-10 PROCEDURE — 99284 EMERGENCY DEPT VISIT MOD MDM: CPT

## 2025-04-10 PROCEDURE — 83735 ASSAY OF MAGNESIUM: CPT | Performed by: PHYSICIAN ASSISTANT

## 2025-04-10 PROCEDURE — 99285 EMERGENCY DEPT VISIT HI MDM: CPT

## 2025-04-10 PROCEDURE — 84484 ASSAY OF TROPONIN QUANT: CPT | Performed by: PHYSICIAN ASSISTANT

## 2025-04-10 RX ORDER — ONDANSETRON 4 MG/1
4 TABLET, FILM COATED ORAL EVERY 6 HOURS
Qty: 12 TABLET | Refills: 0 | OUTPATIENT
Start: 2025-04-10

## 2025-04-10 RX ORDER — MECLIZINE HYDROCHLORIDE 25 MG/1
25 TABLET ORAL ONCE
Status: COMPLETED | OUTPATIENT
Start: 2025-04-10 | End: 2025-04-10

## 2025-04-10 RX ORDER — PREGABALIN 75 MG/1
75 CAPSULE ORAL 3 TIMES DAILY
Qty: 90 CAPSULE | Refills: 2 | Status: SHIPPED | OUTPATIENT
Start: 2025-04-10

## 2025-04-10 RX ORDER — DIAZEPAM 10 MG/2ML
5 INJECTION, SOLUTION INTRAMUSCULAR; INTRAVENOUS ONCE
Status: COMPLETED | OUTPATIENT
Start: 2025-04-10 | End: 2025-04-10

## 2025-04-10 RX ADMIN — SODIUM CHLORIDE 1000 ML: 0.9 INJECTION, SOLUTION INTRAVENOUS at 15:05

## 2025-04-10 RX ADMIN — DIAZEPAM 5 MG: 10 INJECTION, SOLUTION INTRAMUSCULAR; INTRAVENOUS at 15:06

## 2025-04-10 RX ADMIN — TRIMETHOBENZAMIDE HYDROCHLORIDE 200 MG: 100 INJECTION INTRAMUSCULAR at 16:16

## 2025-04-10 RX ADMIN — MECLIZINE HYDROCHLORIDE 25 MG: 25 TABLET ORAL at 17:18

## 2025-04-10 NOTE — ED PROVIDER NOTES
Time reflects when diagnosis was documented in both MDM as applicable and the Disposition within this note       Time User Action Codes Description Comment    4/10/2025  5:43 PM Emmie Stafford Add [R42] Dizziness     4/10/2025  5:43 PM Emmie Stafford Add [R11.2] Nausea and vomiting           ED Disposition       ED Disposition   Discharge    Condition   Stable    Date/Time   Thu Apr 10, 2025  5:43 PM    Comment   Vesna Langston discharge to home/self care.                   Assessment & Plan       Medical Decision Making  65-year-old female presented to the emergency department for evaluation dizziness nausea vomiting.  Vitals and medical record reviewed.  Patient at risk for the following but not limited to dehydration, electrolyte abnormality, medication side effect, vertigo, TIA versus CVA, MI, arrhythmia, viral illness, gastroenteritis. Several of these diagnoses have been evaluated and ruled out by history and physical. As needed, patient will be further evaluated with laboratory and imaging studies. Higher level diagnostics, such as CT imaging or ultrasound, may also be required. Please see work-up portion of the note for further evaluation of patient's risk. Socioeconomic factors were also considered as part of the decision-making process. Unless otherwise stated in the chart or patient is admitted as elsewhere documented, any previously prescribed medications will be maintained.       Problems Addressed:  Dizziness: acute illness or injury  Nausea and vomiting: acute illness or injury    Amount and/or Complexity of Data Reviewed  Labs: ordered. Decision-making details documented in ED Course.    Risk  Prescription drug management.        ED Course as of 04/10/25 1841   Thu Apr 10, 2025   1551 WBC: 9.63   1551 hs TnI 0hr: 4   1551 LACTIC ACID: 1.5   1551 MAGNESIUM: 2.1   1551 TSH 3RD GENERATON: 3.749   1645 Patient is feeling improved, dizziness resolved, nausea improved.  Will attempt to stand and ambulate    1710 Patient ambulated with a steady gait however continues to complain of dizziness.   1750 Patient reevaluated.  States she is feeling somewhat improved.  I offered the patient admission versus discharge.  Patient states she has a appointment with her back specialist tomorrow and therefore she cannot stay.  States she does feel well enough to return home.  We did discuss return precautions and she verbalized understanding.  She is clinically hemodynamically stable for discharge       Medications   trimethobenzamide (TIGAN) IM injection 200 mg (200 mg Intramuscular Given 4/10/25 1616)   sodium chloride 0.9 % bolus 1,000 mL (0 mL Intravenous Stopped 4/10/25 1605)   diazepam (VALIUM) injection 5 mg (5 mg Intravenous Given 4/10/25 1506)   meclizine (ANTIVERT) tablet 25 mg (25 mg Oral Given 4/10/25 1718)       ED Risk Strat Scores                    No data recorded        SBIRT 20yo+      Flowsheet Row Most Recent Value   Initial Alcohol Screen: US AUDIT-C     1. How often do you have a drink containing alcohol? 0 Filed at: 04/10/2025 1449   2. How many drinks containing alcohol do you have on a typical day you are drinking?  0 Filed at: 04/10/2025 1449   3b. FEMALE Any Age, or MALE 65+: How often do you have 4 or more drinks on one occassion? 0 Filed at: 04/10/2025 1449   Audit-C Score 0 Filed at: 04/10/2025 1449   CHRIS: How many times in the past year have you...    Used an illegal drug or used a prescription medication for non-medical reasons? Never Filed at: 04/10/2025 1449                            History of Present Illness       Chief Complaint   Patient presents with    Dizziness     C/o dizziness, nausea, and vomiting since last Thursday. Was seen and evaluated in this department for same on Sunday.       Past Medical History:   Diagnosis Date    Acute respiratory insufficiency 03/22/2024    Allergic     Back pain 2022    Bradycardia 3/22/24    Breast cyst 2000    Chronic HFrEF (heart failure with reduced  ejection fraction) ([facility])     Clotting disorder ([facility]) 4/1/24    Coronary artery disease     COVID-19 virus infection 11/28/2022    Diabetes mellitus ([facility])     Disease of thyroid gland 4/09/2024    Environmental and seasonal allergies 1/1/1960    GERD (gastroesophageal reflux disease) 10/09    GI (gastrointestinal bleed) 4/1/24    Heart disease 3/24    History of placement of internal cardiac defibrillator 3/27/24    Hyperlipidemia     Hypoglycemia     ICD (implantable cardioverter-defibrillator) in place     Ischemic cardiomyopathy     Lactic acidosis 03/22/2024    Migraine 1980    Morning headache 1980    Myocardial infarction ([facility]) 3/22/2024    Nausea & vomiting 04/03/2025    Otitis media 1966    Pacemaker 3/27/24    Peptic ulceration 4/2024    Seasonal allergies     Sinus problem 1970    ST elevation myocardial infarction involving left anterior descending (LAD) coronary artery ([facility]) 03/22/2024    Tobacco abuse     Visual impairment 1967      Past Surgical History:   Procedure Laterality Date    ADENOIDECTOMY      APPENDECTOMY      APPENDECTOMY LAPAROSCOPIC N/A 05/08/2024    Procedure: APPENDECTOMY LAPAROSCOPIC;  Surgeon: Lauryn Ullrich, DO;  Location: [facility];  Service: General    BACK SURGERY  8/23    BREAST EXCISIONAL BIOPSY Right 09/2000    cyst removed- benign    CARDIAC CATHETERIZATION N/A 03/22/2024    Procedure: Cardiac pci;  Surgeon: Gabriel Myers MD;  [facility];  Service: Cardiology    CARDIAC CATHETERIZATION N/A 03/22/2024    Procedure: Cardiac Coronary Angiogram;  Surgeon: Gabriel Myers MD;  [facility];  Service: Cardiology    CARDIAC CATHETERIZATION N/A 03/22/2024    Procedure: Cardiac PCI AMI;  Surgeon: Gabriel Myers MD;  [facility];  Service: Cardiology    CARDIAC CATHETERIZATION N/A 03/22/2024    Procedure: Cardiac IVUS;  Surgeon: Gabriel Myers MD;  [facility];  Service: Cardiology    CARDIAC DEFIBRILLATOR PLACEMENT       CARDIAC ELECTROPHYSIOLOGY PROCEDURE N/A 2024    Procedure: Cardiac icd implant;  Surgeon: Jeffy Lama MD;  [facility];  Service: Cardiology     SECTION      COLONOSCOPY      ECTOPIC PREGNANCY SURGERY      INSERT / REPLACE / REMOVE PACEMAKER      MASS EXCISION Left 10/18/2024    Procedure: EXCISION BIOPSY TISSUE LESION/MASS UPPER EXTREMITY - Left index finger;  Surgeon: Leandro Campos MD;  Location:  MAIN OR;  Service: Orthopedics    SEPTOPLASTY      SPINE SURGERY  23    TONSILLECTOMY      TONSILLECTOMY AND ADENOIDECTOMY  1964    TRIGGER POINT INJECTION      UPPER GASTROINTESTINAL ENDOSCOPY  24      Family History   Adopted: Yes   Problem Relation Age of Onset    Depression Mother     Sleep apnea Mother     Heart disease Father     Snoring Father     Restless legs syndrome Father     OCD Daughter       Social History     Tobacco Use    Smoking status: Former     Current packs/day: 0.00     Average packs/day: 1 pack/day for 24.2 years (24.2 ttl pk-yrs)     Types: Cigarettes     Start date: 2000     Quit date: 3/22/2024     Years since quittin.0     Passive exposure: Never    Smokeless tobacco: Never    Tobacco comments:     Smoked 0.5-1 ppd; quit in 2024.    Vaping Use    Vaping status: Never Used   Substance Use Topics    Alcohol use: Not Currently     Alcohol/week: 1.0 standard drink of alcohol     Types: 1 Shots of liquor per week     Comment: Every 2or 3months    Drug use: Never      E-Cigarette/Vaping    E-Cigarette Use Never User       E-Cigarette/Vaping Substances    Nicotine No     THC No     CBD No     Flavoring No     Other No     Unknown No       I have reviewed and agree with the history as documented.     65-year-old female presents to the emergency department for dizziness nausea and vomiting.  Patient states symptoms started last Thursday after she had an increase in her Ozempic dose.  She was subsequently seen in our emergency department on  Sunday after she had fallen due to the dizziness and dehydration.  Patient was discharged from our facility for prescription with Tigan, has history of prolonged QT and therefore cannot take Zofran or Reglan.  States she was unable to get this over-the-counter however reports over time symptoms seem to be improved.  Then states she had her next dose of Ozempic and symptoms started again.  She reports feeling somewhat lightheaded.  She denies headaches visual changes.  She reports nausea and vomiting.  States she has had significant weight loss since yesterday.  Denies any speech changes.  She denies any unilateral weakness or numbness.        Review of Systems   Constitutional:  Positive for appetite change. Negative for chills, fatigue and fever.   HENT: Negative.     Respiratory: Negative.     Cardiovascular: Negative.    Gastrointestinal:  Positive for nausea and vomiting. Negative for abdominal pain.   Genitourinary: Negative.    Musculoskeletal: Negative.    Skin: Negative.    Neurological:  Positive for dizziness and light-headedness. Negative for tremors, seizures, facial asymmetry, speech difficulty, weakness, numbness and headaches.   All other systems reviewed and are negative.          Objective       ED Triage Vitals [04/10/25 1450]   Temperature Pulse Blood Pressure Respirations SpO2 Patient Position - Orthostatic VS   98.4 °F (36.9 °C) 76 146/81 18 98 % Sitting      Temp Source Heart Rate Source BP Location FiO2 (%) Pain Score    Temporal Monitor Left arm -- 6      Vitals      Date and Time Temp Pulse SpO2 Resp BP Pain Score FACES Pain Rating User   04/10/25 1615 -- 70 97 % 18 104/53 -- -- SR   04/10/25 1450 98.4 °F (36.9 °C) 76 98 % 18 146/81 6 -- KW            Physical Exam  Vitals and nursing note reviewed.   Constitutional:       General: She is not in acute distress.     Appearance: Normal appearance. She is not ill-appearing, toxic-appearing or diaphoretic.   HENT:      Head: Normocephalic and  atraumatic.      Right Ear: Tympanic membrane, ear canal and external ear normal.      Left Ear: Tympanic membrane, ear canal and external ear normal.      Nose: Nose normal.      Mouth/Throat:      Mouth: Mucous membranes are moist.      Pharynx: No oropharyngeal exudate or posterior oropharyngeal erythema.   Eyes:      Extraocular Movements: Extraocular movements intact.      Conjunctiva/sclera: Conjunctivae normal.      Pupils: Pupils are equal, round, and reactive to light.      Comments: No vertical nystagmus    Cardiovascular:      Rate and Rhythm: Normal rate and regular rhythm.   Pulmonary:      Effort: Pulmonary effort is normal.      Breath sounds: Normal breath sounds. No stridor. No wheezing, rhonchi or rales.   Chest:      Chest wall: No tenderness.   Abdominal:      General: Bowel sounds are normal. There is no distension.      Palpations: Abdomen is soft.      Tenderness: There is no abdominal tenderness.   Musculoskeletal:         General: Normal range of motion.      Cervical back: Normal range of motion.   Skin:     General: Skin is warm and dry.      Capillary Refill: Capillary refill takes less than 2 seconds.      Findings: No bruising, erythema or rash.   Neurological:      General: No focal deficit present.      Mental Status: She is alert and oriented to person, place, and time.      GCS: GCS eye subscore is 4. GCS verbal subscore is 5. GCS motor subscore is 6.      Sensory: No sensory deficit.      Motor: No weakness.      Coordination: Coordination normal.      Gait: Gait normal.      Deep Tendon Reflexes: Reflexes normal.   Psychiatric:         Mood and Affect: Mood normal.         Behavior: Behavior normal.         Results Reviewed       Procedure Component Value Units Date/Time    TSH, 3rd generation with Free T4 reflex [[id number]]  (Normal) Collected: 04/10/25 1503    Lab Status: Final result Specimen: Blood from Arm, Left Updated: 04/10/25 1545     TSH 3RD [facility] 3.749 uIU/mL     HS  Troponin 0hr (reflex protocol) [[id number]]  (Normal) Collected: 04/10/25 1503    Lab Status: Final result Specimen: Blood from Arm, Left Updated: 04/10/25 1538     hs TnI 0hr 4 ng/L     Lactic acid, plasma (w/reflex if result > 2.0) [[id number]]  (Normal) Collected: 04/10/25 1503    Lab Status: Final result Specimen: Blood from Arm, Left Updated: 04/10/25 1529     LACTIC ACID 1.5 mmol/L     Narrative:      Result may be elevated if tourniquet was used during collection.    Comprehensive metabolic panel [[id number]]  (Abnormal) Collected: 04/10/25 1503    Lab Status: Final result Specimen: Blood from Arm, Left Updated: 04/10/25 1529     Sodium 139 mmol/L      Potassium 4.2 mmol/L      Chloride 108 mmol/L      CO2 20 mmol/L      ANION GAP 11 mmol/L      BUN 10 mg/dL      Creatinine 0.82 mg/dL      Glucose 119 mg/dL      Calcium 9.2 mg/dL      AST 29 U/L      ALT 27 U/L      Alkaline Phosphatase 46 U/L      Total Protein 7.4 g/dL      Albumin 4.3 g/dL      Total Bilirubin 0.68 mg/dL      eGFR 74 ml/min/1.73sq m     Narrative:      National Kid[employer] Foundation guidelines for Chronic Kidney Disease (CKD):     Stage 1 with normal or high GFR (GFR > 90 mL/min/1.73 square meters)    Stage 2 Mild CKD (GFR = 60-89 mL/min/1.73 square meters)    Stage 3A Moderate CKD (GFR = 45-59 mL/min/1.73 square meters)    Stage 3B Moderate CKD (GFR = 30-44 mL/min/1.73 square meters)    Stage 4 Severe CKD (GFR = 15-29 mL/min/1.73 square meters)    Stage 5 End Stage CKD (GFR <15 mL/min/1.73 square meters)  Note: GFR calculation is accurate only with a steady state creatinine    Magnesium [[id number]]  (Normal) Collected: 04/10/25 1503    Lab Status: Final result Specimen: Blood from Arm, Left Updated: 04/10/25 1529     Magnesium 2.1 mg/dL     Protime-INR [[id number]]  (Normal) Collected: 04/10/25 1503    Lab Status: Final result Specimen: Blood from Arm, Left Updated: 04/10/25 1526     Protime 14.7 seconds      INR 1.11    Narrative:       INR Therapeutic Range    Indication                                             INR Range      Atrial Fibrillation                                               2.0-3.0  Hypercoagulable State                                    2.0.2.3  Left Ventricular Asist Device                            2.0-3.0  Mechanical Heart Valve                                  -    Aortic(with afib, MI, embolism, HF, LA enlargement,    and/or coagulopathy)                                     2.0-3.0 (2.5-3.5)     Mitral                                                             2.5-3.5  Prosthetic/Bioprosthetic Heart Valve               2.0-3.0  Venous thromboembolism (VTE: VT, PE        2.0-3.0    APTT [[id number]]  (Normal) Collected: 04/10/25 1503    Lab Status: Final result Specimen: Blood from Arm, Left Updated: 04/10/25 1526     PTT 30 seconds     CBC and differential [[id number]]  (Abnormal) Collected: 04/10/25 1503    Lab Status: Final result Specimen: Blood from Arm, Left Updated: 04/10/25 1513     WBC 9.63 Thousand/uL      RBC 5.23 Million/uL      Hemoglobin 13.5 g/dL      Hematocrit 44.1 %      MCV 84 fL      MCH 25.8 pg      MCHC 30.6 g/dL      RDW 15.9 %      MPV 9.8 fL      Platelets 342 Thousands/uL      nRBC 0 /100 WBCs      Segmented % 69 %      Immature Grans % 0 %      Lymphocytes % 17 %      Monocytes % 10 %      Eosinophils Relative 3 %      Basophils Relative 1 %      Absolute Neutrophils 6.61 Thousands/µL      Absolute Immature Grans 0.03 Thousand/uL      Absolute Lymphocytes 1.68 Thousands/µL      Absolute Monocytes 0.96 Thousand/µL      Eosinophils Absolute 0.30 Thousand/µL      Basophils Absolute 0.05 Thousands/µL     UA (URINE) with reflex to Scope [[id number]]     Lab Status: No result Specimen: Urine             No orders to display       ECG 12 Lead Documentation Only    Date/Time: 4/10/2025 6:39 PM    Performed by: Emmie Stafford PA-C  Authorized by: Emmie Stafford PA-C    Patient location:   ED  Interpretation:     Interpretation: non-specific    Rate:     ECG rate:  70    ECG rate assessment: normal    Rhythm:     Rhythm: sinus rhythm and paced        ED Medication and Procedure Management   Prior to Admission Medications   Prescriptions Last Dose Informant Patient Reported? Taking?   Durolane 60 MG/3ML injection  Self Yes No   Patient not taking: Reported on 4/10/2025   Empagliflozin (Jardiance) 25 MG TABS  Self No No   Sig: Take 1 tablet (25 mg total) by mouth every morning   Multiple Vitamin (multivitamin) tablet  Self Yes No   Sig: Take 1 tablet by mouth daily   albuterol (ProAir HFA) 90 mcg/act inhaler  Self No No   Sig: Inhale 2 puffs every 6 (six) hours as needed for wheezing   amoxicillin (AMOXIL) 500 mg capsule   No No   Sig: Take 1 capsule (500 mg total) by mouth every 8 (eight) hours for 7 days   Patient not taking: Reported on 4/10/2025   apixaban (ELIQUIS) 5 mg  Self No No   Sig: Take 1 tablet (5 mg total) by mouth 2 (two) times a day   atorvastatin (LIPITOR) 80 mg tablet  Self No No   Sig: TAKE 1 TABLET BY MOUTH EVERY DAY IN THE EVENING   clopidogrel (PLAVIX) 75 mg tablet  Self No No   Sig: Take 1 tablet (75 mg total) by mouth daily   diphenhydrAMINE (BENADRYL) 25 mg capsule  Self Yes No   Sig: Take 25 mg by mouth every 6 (six) hours as needed for itching   dofetilide (TIKOSYN) 125 mcg capsule  Self No No   Sig: Take 1 capsule (125 mcg total) by mouth every 12 (twelve) hours   ergocalciferol (VITAMIN D2) 50,000 units  Self No No   Sig: Take 1 capsule (50,000 Units total) by mouth once a week   fluticasone (FLONASE) 50 mcg/act nasal spray  Self No No   Si spray into each nostril daily   furosemide (LASIX) 20 mg tablet  Self No No   Sig: TAKE 2 TABLETS (40 MG TOTAL) BY MOUTH 2 (TWO) TIMES A DAY FOR 5 DAYS. THEN 2 TABLETS (40 MG TOTAL) IN THE AM & THEN 1 TABLET- 20 MG IN THE PM.   furosemide (LASIX) 40 mg tablet  Self No No   Sig: Take 1 tablet (40 mg total) by mouth if needed (if worse  shortness of breath or weight up 3lbs)   isosorbide mononitrate (IMDUR) 30 mg 24 hr tablet  Self No No   Sig: TAKE 1 TABLET (30 MG TOTAL) BY MOUTH 2 (TWO) TIMES A DAY 30 MG IN THE AM AND 30 MG IN THE PM   levothyroxine (Synthroid) 75 mcg tablet  Self No No   Sig: Take 1 tablet (75 mcg total) by mouth daily   meclizine (ANTIVERT) 25 mg tablet   No No   Sig: Take 1 tablet (25 mg total) by mouth every 8 (eight) hours as needed for dizziness   metoclopramide (Reglan) 10 mg tablet   No No   Sig: Take 1 tablet (10 mg total) by mouth every 6 (six) hours as needed (Nausea) for up to 10 doses   Patient not taking: Reported on 4/10/2025   metoprolol succinate (TOPROL-XL) 25 mg 24 hr tablet  Self No No   Sig: Take 2 tablets (50 mg total) by mouth daily at bedtime   nitroglycerin (NITROSTAT) 0.4 mg SL tablet  Self No No   Sig: PLACE 1 TABLET UNDER THE TONGUE EVERY 5 MINUTES AS NEEDED FOR CHEST PAIN.   Patient taking differently: As needed   pantoprazole (PROTONIX) 40 mg tablet  Self No No   Sig: TAKE 1 TABLET BY MOUTH 2 TIMES A DAY BEFORE MEALS.   potassium chloride (Klor-Con M20) 20 mEq tablet  Self No No   Sig: Take 1 tablet (20 mEq total) by mouth if needed (on lasix days)   pregabalin (LYRICA) 75 mg capsule   No No   Sig: Take 1 capsule (75 mg total) by mouth 3 (three) times a day   sacubitril-valsartan (Entresto) 49-51 MG TABS  Self No No   Sig: Take 1 tablet by mouth 2 (two) times a day   semaglutide, 1 mg/dose, (Ozempic) 4 mg/3 mL injection pen   No No   Sig: Inject 0.75 mL (1 mg total) under the skin once a week   trimethobenzamide (TIGAN) 300 mg capsule   No No   Sig: Take 1 capsule (300 mg total) by mouth 3 (three) times a day as needed for nausea for up to 10 doses      Facility-Administered Medications: None     Discharge Medication List as of 4/10/2025  5:44 PM        CONTINUE these medications which have NOT CHANGED    Details   albuterol (ProAir HFA) 90 mcg/act inhaler Inhale 2 puffs every 6 (six) hours as needed  for wheezing, Starting Wed 5/1/2024, Normal      amoxicillin (AMOXIL) 500 mg capsule Take 1 capsule (500 mg total) by mouth every 8 (eight) hours for 7 days, Starting Fri 4/4/2025, Until Fri 4/11/2025, Normal      apixaban (ELIQUIS) 5 mg Take 1 tablet (5 mg total) by mouth 2 (two) times a day, Starting Thu 2/6/2025, Normal      atorvastatin (LIPITOR) 80 mg tablet TAKE 1 TABLET BY MOUTH EVERY DAY IN THE EVENING, Starting Wed 1/15/2025, Normal      clopidogrel (PLAVIX) 75 mg tablet Take 1 tablet (75 mg total) by mouth daily, Starting Tue 12/24/2024, Normal      diphenhydrAMINE (BENADRYL) 25 mg capsule Take 25 mg by mouth every 6 (six) hours as needed for itching, Historical Med      dofetilide (TIKOSYN) 125 mcg capsule Take 1 capsule (125 mcg total) by mouth every 12 (twelve) hours, Starting Thu 2/13/2025, Normal      Durolane 60 MG/3ML injection Historical Med      Empagliflozin (Jardiance) 25 MG TABS Take 1 tablet (25 mg total) by mouth every morning, Starting Fri 1/3/2025, Until Sat 6/27/2026, Normal      ergocalciferol (VITAMIN D2) 50,000 units Take 1 capsule (50,000 Units total) by mouth once a week, Starting Wed 1/15/2025, Normal      fluticasone (FLONASE) 50 mcg/act nasal spray 1 spray into each nostril daily, Starting Wed 11/20/2024, Normal      !! furosemide (LASIX) 20 mg tablet TAKE 2 TABLETS (40 MG TOTAL) BY MOUTH 2 (TWO) TIMES A DAY FOR 5 DAYS. THEN 2 TABLETS (40 MG TOTAL) IN THE AM & THEN 1 TABLET- 20 MG IN THE PM., Normal      !! furosemide (LASIX) 40 mg tablet Take 1 tablet (40 mg total) by mouth if needed (if worse shortness of breath or weight up 3lbs), Starting Wed 4/2/2025, Until Thu 9/24/2026 at 2359, Normal      isosorbide mononitrate (IMDUR) 30 mg 24 hr tablet TAKE 1 TABLET (30 MG TOTAL) BY MOUTH 2 (TWO) TIMES A DAY 30 MG IN THE AM AND 30 MG IN THE PM, Starting Wed 2/12/2025, Until Thu 8/6/2026, Normal      levothyroxine (Synthroid) 75 mcg tablet Take 1 tablet (75 mcg total) by mouth daily,  Starting Thu 8/15/2024, Normal      meclizine (ANTIVERT) 25 mg tablet Take 1 tablet (25 mg total) by mouth every 8 (eight) hours as needed for dizziness, Starting Sat 4/5/2025, Normal      metoclopramide (Reglan) 10 mg tablet Take 1 tablet (10 mg total) by mouth every 6 (six) hours as needed (Nausea) for up to 10 doses, Starting Sun 4/6/2025, Normal      metoprolol succinate (TOPROL-XL) 25 mg 24 hr tablet Take 2 tablets (50 mg total) by mouth daily at bedtime, Starting Thu 5/30/2024, Until Fri 11/21/2025, Normal      Multiple Vitamin (multivitamin) tablet Take 1 tablet by mouth daily, Historical Med      nitroglycerin (NITROSTAT) 0.4 mg SL tablet PLACE 1 TABLET UNDER THE TONGUE EVERY 5 MINUTES AS NEEDED FOR CHEST PAIN., Normal      pantoprazole (PROTONIX) 40 mg tablet TAKE 1 TABLET BY MOUTH 2 TIMES A DAY BEFORE MEALS., Starting Thu 12/12/2024, Normal      potassium chloride (Klor-Con M20) 20 mEq tablet Take 1 tablet (20 mEq total) by mouth if needed (on lasix days), Starting Fri 2/14/2025, Until Sat 8/8/2026 at 2359, Normal      pregabalin (LYRICA) 75 mg capsule Take 1 capsule (75 mg total) by mouth 3 (three) times a day, Starting Thu 4/10/2025, Normal      sacubitril-valsartan (Entresto) 49-51 MG TABS Take 1 tablet by mouth 2 (two) times a day, Starting Tue 1/28/2025, Until Sun 7/27/2025, Normal      semaglutide, 1 mg/dose, (Ozempic) 4 mg/3 mL injection pen Inject 0.75 mL (1 mg total) under the skin once a week, Starting Wed 4/9/2025, Normal      trimethobenzamide (TIGAN) 300 mg capsule Take 1 capsule (300 mg total) by mouth 3 (three) times a day as needed for nausea for up to 10 doses, Starting Sun 4/6/2025, Normal       !! - Potential duplicate medications found. Please discuss with provider.        No discharge procedures on file.  ED SEPSIS DOCUMENTATION   Time reflects when diagnosis was documented in both MDM as applicable and the Disposition within this note       Time User Action Codes Description Comment     4/10/2025  5:43 PM Emmie Stafford [R42] Dizziness     4/10/2025  5:43 PM Emmie Stafford [R11.2] Nausea and vomiting                  Emmie Stafford PA-C  04/10/25 1841

## 2025-04-10 NOTE — ED NOTES
Pt ambulated in hallway with no assistance. Pt states she felt dizzy while ambulating     Magda Galvan RN  04/10/25 6337

## 2025-04-10 NOTE — LETTER
April 10, 2025     Patient: Vesna Langston  YOB: 1960  Date of Visit: 4/10/2025      To Whom it May Concern:    Vesna Langston is under my professional care. Vesna was seen in my office on 4/10/2025. Vesna may not return to work until after reassessment by neurosurgery after her surgical procedure on May 1st.     If you have any questions or concerns, please don't hesitate to call.         Sincerely,          Maxime Cameron MD        CC: No Recipients

## 2025-04-10 NOTE — DISCHARGE INSTRUCTIONS
I recommend a bland diet.  I also recommend that you follow-up with your family doctor.  Stay well-hydrated.  Return to the emergency department any new or worsening symptoms.

## 2025-04-10 NOTE — TELEPHONE ENCOUNTER
Patient called back stating that she called around to pharmacies in the area and none are able to get Tigan.  Patient is currently at the ER because she became sick at her pain management appointment today and is down another 3 lbs from yesterday.

## 2025-04-10 NOTE — TELEPHONE ENCOUNTER
Patient called back stating that she was given Reglan from the ER and it helped somewhat but not very well.  She was not able to get the Tigan at the pharmacy and she is unable to take Zofran due to her heart.  Patient would like to try Tigan but is unsure where she would be able to get that medication as [facility] told her that they cannot get that medication.

## 2025-04-10 NOTE — TELEPHONE ENCOUNTER
Patient contacted the office this morning. Was seen for an OV yesterday, pcp updated Ozempic script. Having issues still with the nausea vomiting. States she spoke with pcp in regards to this, asking for something to please be called into the [facility] pharmacy for her to help with this as she was already seen in the ED previously for this. Please contact patient back with an update, thank you.

## 2025-04-11 ENCOUNTER — OFFICE VISIT (OUTPATIENT)
Dept: OBGYN CLINIC | Facility: HOSPITAL | Age: 65
End: 2025-04-11
Payer: OTHER MISCELLANEOUS

## 2025-04-11 VITALS — WEIGHT: 212.96 LBS | BODY MASS INDEX: 32.28 KG/M2 | HEIGHT: 68 IN

## 2025-04-11 DIAGNOSIS — M54.16 RADICULOPATHY, LUMBAR REGION: Primary | ICD-10-CM

## 2025-04-11 LAB
ATRIAL RATE: 70 BPM
P AXIS: 40 DEGREES
PR INTERVAL: 198 MS
QRS AXIS: 55 DEGREES
QRSD INTERVAL: 78 MS
QT INTERVAL: 436 MS
QTC INTERVAL: 470 MS
T WAVE AXIS: 72 DEGREES
VENTRICULAR RATE: 70 BPM

## 2025-04-11 PROCEDURE — 99213 OFFICE O/P EST LOW 20 MIN: CPT | Performed by: ORTHOPAEDIC SURGERY

## 2025-04-11 PROCEDURE — 93010 ELECTROCARDIOGRAM REPORT: CPT | Performed by: INTERNAL MEDICINE

## 2025-04-11 NOTE — PROGRESS NOTES
Assessment & Plan/Medical Decision Makin y.o. female with Back Pain, Left Radicular Leg Pain, and Ambulatory Dysfunction and imaging findings most notable for lumbar spondylosis, L3-S1 fusion construct with evidence of pseudoarthrosis          The clinical, physical and imaging findings were reviewed with the patient.  Vesna  has a constellation of findings consistent with Lumbar Myofascial Pain, Lumbar Radiculopathy, and Ambulatory Dysfunction in the setting of lumbar degenerative disease, history of workplace injury that is s/p L3-S1 fusion with evidence of pseudoarthrosis.      We previously discussed surgical options. Will plan to proceed with exploration of fusion mass, revision posterior lumbar fusion with instrumentation L3-S1, with transforaminal lumbar interbody fusion to address mechanical back pain, pseudoarthrosis. Upcoming surgery is currently scheduled for 2025. Work note placed in chart.     Patient instructed to return to office/ER sooner if symptoms are not improving, getting worse, or new worrisome/neurologic symptoms arise.     Subjective:      Chief Complaint: Back Pain    HPI:  Vesna Langston is a 65 y.o. female presenting for initial visit with chief complaint of low back pain - referred by Dr. Pete. This is a second opinion requested by workers comp.  Pain began 2 years ago after an injury at work. She received a fusion of L3-S1 in .  2 weeks after she return to work she suffered a twisting injury which caused her to return to her surgeon.  X-ray revealed loosening of multiple screws in her lumbar spine.  She was offered an SI injection to help with low back pain. 5 hours after the injection she suffered a heart attack in which a she received an ICD and stent. Today, she claims that she cannot feel her left leg. She attempts to walk on a treadmill for her cardiac rehab in which her left leg will give out 10-15 times during her hour long rehab. She reports that she is currently  in physical therapy that is providing no relief. Describes pain as crushing in nature.  Located in low back.  + numbness . + burning.    Denies fever or chills, no night sweats. Denies any bladder or bowel changes.      Conservative therapy includes the following:   Medications: Tylenol arthritis, gabapentin 2x per day. Norco as needed.    Injections: Yes SI joint 2 years ago     Physical Therapy: Yes  Chiropractic Medicine: has not attempted  Accupunture/Massage Therapy: has not attempted   These therapeutic modalities were ineffective at providing sustained pain relief/functional improvement.     Nicotine dependent: no  Occupation: disabled  Living situation: Lives with family   ADLs: patient is unable to perform ADL's such as shopping, cleaning, driving, etc     2/9/25 Update  Patient is here for follow up.  Obtained updated Lumbar MRI in the interim.  Vesna is here today with her son.  Continues with back pain, leg pain/fatigue.  Reports her left leg still feels weak.  She is interested in pursuing revision surgery.      4/11/2025 Update:  Patient is here for follow up. She notes having to have an appointment every 90 days as dictated by her insurance. Her symptoms remain unchanged since her previous evaluation. Continues with back pain and leg pain with fatigue. Continues with left lower extremity weakness. Using cane for ambulation. Wearing LSO brace for support as needed. She is still interested in pursuing surgery as previous discussed and scheduled. She as recently cleared by her cardiologist and PCP to undergo scheduled surgery.     Objective:     Family History   Adopted: Yes   Problem Relation Age of Onset    Depression Mother     Sleep apnea Mother     Heart disease Father     Snoring Father     Restless legs syndrome Father     OCD Daughter        Past Medical History:   Diagnosis Date    Acute respiratory insufficiency 03/22/2024    Allergic     Back pain 2022    Bradycardia 3/22/24    Breast cyst  2000    Chronic HFrEF (heart failure with reduced ejection fraction) ([facility])     Clotting disorder ([facility]) 4/1/24    Coronary artery disease     COVID-19 virus infection 11/28/2022    Diabetes mellitus ([facility])     Disease of thyroid gland 4/09/2024    Environmental and seasonal allergies 1/1/1960    GERD (gastroesophageal reflux disease) 10/09    GI (gastrointestinal bleed) 4/1/24    Heart disease 3/24    History of placement of internal cardiac defibrillator 3/27/24    Hyperlipidemia     Hypoglycemia     ICD (implantable cardioverter-defibrillator) in place     Ischemic cardiomyopathy     Lactic acidosis 03/22/2024    Migraine 1980    Morning headache 1980    Myocardial infarction ([facility]) 3/22/2024    Nausea & vomiting 04/03/2025    Otitis media 1966    Pacemaker 3/27/24    Peptic ulceration 4/2024    Seasonal allergies     Sinus problem 1970    ST elevation myocardial infarction involving left anterior descending (LAD) coronary artery ([facility]) 03/22/2024    Tobacco abuse     Visual impairment 1967       Current Outpatient Medications   Medication Sig Dispense Refill    albuterol (ProAir HFA) 90 mcg/act inhaler Inhale 2 puffs every 6 (six) hours as needed for wheezing 8.5 g 0    apixaban (ELIQUIS) 5 mg Take 1 tablet (5 mg total) by mouth 2 (two) times a day 180 tablet 1    atorvastatin (LIPITOR) 80 mg tablet TAKE 1 TABLET BY MOUTH EVERY DAY IN THE EVENING 90 tablet 1    clopidogrel (PLAVIX) 75 mg tablet Take 1 tablet (75 mg total) by mouth daily 90 tablet 3    diphenhydrAMINE (BENADRYL) 25 mg capsule Take 25 mg by mouth every 6 (six) hours as needed for itching      dofetilide (TIKOSYN) 125 mcg capsule Take 1 capsule (125 mcg total) by mouth every 12 (twelve) hours 180 capsule 3    Durolane 60 MG/3ML injection  (Patient not taking: Reported on 4/10/2025)      Empagliflozin (Jardiance) 25 MG TABS Take 1 tablet (25 mg total) by mouth every morning 30 tablet 17    ergocalciferol (VITAMIN D2) 50,000 units Take 1 capsule (50,000  Units total) by mouth once a week 12 capsule 1    fluticasone (FLONASE) 50 mcg/act nasal spray 1 spray into each nostril daily 9.9 mL 0    furosemide (LASIX) 20 mg tablet TAKE 2 TABLETS (40 MG TOTAL) BY MOUTH 2 (TWO) TIMES A DAY FOR 5 DAYS. THEN 2 TABLETS (40 MG TOTAL) IN THE AM & THEN 1 TABLET- 20 MG IN THE PM. 190 tablet 1    furosemide (LASIX) 40 mg tablet Take 1 tablet (40 mg total) by mouth if needed (if worse shortness of breath or weight up 3lbs) 60 tablet 5    isosorbide mononitrate (IMDUR) 30 mg 24 hr tablet TAKE 1 TABLET (30 MG TOTAL) BY MOUTH 2 (TWO) TIMES A DAY 30 MG IN THE AM AND 30 MG IN THE  tablet 1    levothyroxine (Synthroid) 75 mcg tablet Take 1 tablet (75 mcg total) by mouth daily 90 tablet 3    meclizine (ANTIVERT) 25 mg tablet Take 1 tablet (25 mg total) by mouth every 8 (eight) hours as needed for dizziness 15 tablet 0    metoclopramide (Reglan) 10 mg tablet Take 1 tablet (10 mg total) by mouth every 6 (six) hours as needed (Nausea) for up to 10 doses (Patient not taking: Reported on 4/10/2025) 10 tablet 0    metoprolol succinate (TOPROL-XL) 25 mg 24 hr tablet Take 2 tablets (50 mg total) by mouth daily at bedtime 180 tablet 5    Multiple Vitamin (multivitamin) tablet Take 1 tablet by mouth daily      nitroglycerin (NITROSTAT) 0.4 mg SL tablet PLACE 1 TABLET UNDER THE TONGUE EVERY 5 MINUTES AS NEEDED FOR CHEST PAIN. (Patient taking differently: As needed) 25 tablet 1    pantoprazole (PROTONIX) 40 mg tablet TAKE 1 TABLET BY MOUTH 2 TIMES A DAY BEFORE MEALS. 180 tablet 1    potassium chloride (Klor-Con M20) 20 mEq tablet Take 1 tablet (20 mEq total) by mouth if needed (on lasix days) 90 tablet 1    pregabalin (LYRICA) 75 mg capsule Take 1 capsule (75 mg total) by mouth 3 (three) times a day 90 capsule 2    sacubitril-valsartan (Entresto) 49-51 MG TABS Take 1 tablet by mouth 2 (two) times a day 60 tablet 5    semaglutide, 1 mg/dose, (Ozempic) 4 mg/3 mL injection pen Inject 0.75 mL (1 mg  total) under the skin once a week 9 mL 1    trimethobenzamide (TIGAN) 300 mg capsule Take 1 capsule (300 mg total) by mouth 3 (three) times a day as needed for nausea for up to 10 doses 10 capsule 0     No current facility-administered medications for this visit.       Past Surgical History:   Procedure Laterality Date    ADENOIDECTOMY      APPENDECTOMY      APPENDECTOMY LAPAROSCOPIC N/A 2024    Procedure: APPENDECTOMY LAPAROSCOPIC;  Surgeon: Lauryn Ullrich, DO;  Location: [facility];  Service: General    BACK SURGERY      BREAST EXCISIONAL BIOPSY Right 2000    cyst removed- benign    CARDIAC CATHETERIZATION N/A 2024    Procedure: Cardiac pci;  Surgeon: Gabriel Myers MD;  [facility]: [facility];  Service: Cardiology    CARDIAC CATHETERIZATION N/A 2024    Procedure: Cardiac Coronary Angiogram;  Surgeon: Gabriel Myers MD;  [facility];  Service: Cardiology    CARDIAC CATHETERIZATION N/A 2024    Procedure: Cardiac PCI AMI;  Surgeon: Gabriel Myers MD;  [facility];  Service: Cardiology    CARDIAC CATHETERIZATION N/A 2024    Procedure: Cardiac IVUS;  Surgeon: Gabriel Myers MD;  [facility];  Service: Cardiology    CARDIAC DEFIBRILLATOR PLACEMENT      CARDIAC ELECTROPHYSIOLOGY PROCEDURE N/A 2024    Procedure: Cardiac icd implant;  Surgeon: Jeffy Lama MD;  [facility];  Service: Cardiology     SECTION      COLONOSCOPY      ECTOPIC PREGNANCY SURGERY      INSERT / REPLACE / REMOVE PACEMAKER      MASS EXCISION Left 10/18/2024    Procedure: EXCISION BIOPSY TISSUE LESION/MASS UPPER EXTREMITY - Left index finger;  Surgeon: Leandro Campos MD;  Location: [facility];  Service: Orthopedics    SEPTOPLASTY      SPINE SURGERY  23    TONSILLECTOMY      TONSILLECTOMY AND ADENOIDECTOMY  1964    TRIGGER POINT INJECTION      UPPER GASTROINTESTINAL ENDOSCOPY  24       Social History     Socioeconomic  History    Marital status:      Spouse name: Not on file    Number of children: 3    Years of education: Not on file    Highest education level: Not on file   Occupational History    Not on file   Tobacco Use    Smoking status: Former     Current packs/day: 0.00     Average packs/day: 1 pack/day for 24.2 years (24.2 ttl pk-yrs)     Types: Cigarettes     Start date: 2000     Quit date: 3/22/2024     Years since quittin.0     Passive exposure: Never    Smokeless tobacco: Never    Tobacco comments:     Smoked 0.5-1 ppd; quit in 2024.    Vaping Use    Vaping status: Never Used   Substance and Sexual Activity    Alcohol use: Not Currently     Alcohol/week: 1.0 standard drink of alcohol     Types: 1 Shots of liquor per week     Comment: Every 2or 3months    Drug use: Never    Sexual activity: Not Currently     Partners: Male     Birth control/protection: Post-menopausal   Other Topics Concern    Not on file   Social History Narrative    Not on file     Social Drivers of Health     Financial Resource Strain: Low Risk  (2023)    [facility]    Overall Financial Resource Strain (CARDIA)     Difficulty of Paying Living Expenses: Not hard at all   Food Insecurity: No Food Insecurity (2024)    Nursing - Inadequate Food Risk Classification     Worried About Running Out of Food in the Last Year: Never true     Ran Out of Food in the Last Year: Never true     Ran Out of Food in the Last Year: Never true   Transportation Needs: No Transportation Needs (2024)    Nursing - Transportation Risk Classification     Lack of Transportation: Not on file     Lack of Transportation: No   Physical Activity: Not on file   Stress: No Stress Concern Present (2023)    [facility] from [facility], [facility]    [employer] - Occupational Stress Questionnaire     Feeling of Stress : Not at  "all   Social Connections: Unknown (2024)    Received from [employer]     How often do you feel lonely or isolated from those around you? (Adult - for ages 18 years and over): Not on file   Intimate Partner Violence: Unknown (2024)    Nursing IPS     Feels Physically and Emotionally Safe: Not on file     Physically Hurt by Someone: Not on file     Humiliated or Emotionally Abused by Someone: Not on file     Physically Hurt by Someone: No     Hurt or Threatened by Someone: No   Housing Stability: Unknown (2024)    Nursing: Inadequate Housing Risk Classification     Has Housing: Not on file     Worried About Losing Housing: Not on file     Unable to Get Utilities: Not on file     Unable to Pay for Housing in the Last Year: No     Has Housin   Recent Concern: Housing Stability - High Risk (2024)    Housing Stability Vital Sign     Unable to Pay for Housing in the Last Year: No     Number of Times Moved in the Last Year: 2     Homeless in the Last Year: No       Allergies   Allergen Reactions    Fish-Derived Products - Food Allergy Anaphylaxis     Cannot have all seafood products    Shellfish-Derived Products - Food Allergy Anaphylaxis    Azithromycin Hives and Rash       Review of Systems  General- denies fever/chills  HEENT- denies hearing loss or sore throat  Eyes- denies eye pain or visual disturbances, denies red eyes  Respiratory- denies cough or SOB  Cardio- denies chest pain or palpitations  GI- denies abdominal pain  Endocrine- denies urinary frequency  Urinary- denies pain with urination  Musculoskeletal- Negative except noted above  Skin- denies rashes or wounds  Neurological- denies dizziness or headache  Psychiatric- denies anxiety or difficulty concentrating    Physical Exam  Ht 5' 8\" (1.727 m)   Wt 96.6 kg (212 lb 15.4 oz)   BMI 32.38 kg/m²     General/Constitutional: No apparent distress: well-nourished and well developed.  Lymphatic: No appreciable " lymphadenopathy  Respiratory: Non-labored breathing  Vascular: No edema, swelling or tenderness, except as noted in detailed exam.  Integumentary: No impressive skin lesions present, except as noted in detailed exam.  Psych: Normal mood and affect, oriented to person, place and time.  MSK: normal other than stated in HPI and exam  Gait & balance: no evidence of myelopathic gait, ambulates with a Cane    Lumbar spine range of motion:  -Forward flexion to 45  -Extension to neutral  -Lateral bend 15 right, 15 left  -Rotation 5 right, 5 left  There is tenderness with palpation along lumbar paraspinal musculature, no midline tenderness , well-healed surgical sites    Neurologic:    Lower Extremity Motor Function    Right  Left    Iliopsoas  5/5  4/5    Quadriceps 5/5 4/5   Tibialis anterior  5/5  4/5    EHL  5/5  3+/5    Gastroc. muscle  5/5  2/5    Heel rise  5/5  2/5    Toe rise  5/5  2/5      Sensory: light touch is intact to bilateral upper and lower extremities     Reflexes: Intact/diminished    Other tests:  Straight Leg Raise: positive  Jazmyn SI: positive  MAGNO SI: negative  Greater troch: no tenderness   Internal/external hip ROM: intact, no pain   Flexion/extension knee ROM: intact, no pain   Vascular: WWP extremities    Diagnostic Tests   IMAGING: I have personally reviewed the images and these are my findings:  Lumbar Spine X-rays from 8/20/24: multi level lumbar spondylosis, L3-S1 bilateral pedicle screw and aliya construct with obvious loosening of the L3 screws, the right L4 screw is misplaced and not within the pedicle, L4-5 spondylolisthesis    Lumbar Spine MRI from 3/18/2024: L3-S1 fusion construct with no significant central or foraminal stenosis appreciated    CT abdomen and pelvis from 5/7/2024: L3-S1 fusion construct with gross loosening of the L3 screws with halo appearance around bilateral screws, L4 screws misplaced and not within the pedicle, the bilateral L5 screws appear to have lateral cortex  "violation, screw loosening of the S1 left pedicle screw with halo appearance    Lumbar Spine MRI from 2/4/25: L3-S1 fusion construct with no significant central or foraminal stenosis appreciated    Procedures, if performed today     None performed       Portions of the record may have been created with voice recognition software.  Occasional wrong word or \"sound a like\" substitutions may have occurred due to the inherent limitations of voice recognition software.  Read the chart carefully and recognize, using context, where substitutions have occurred.   "

## 2025-04-11 NOTE — PROGRESS NOTES
Assessment  1. Other spondylosis with radiculopathy, lumbar region  -     pregabalin (LYRICA) 75 mg capsule; Take 1 capsule (75 mg total) by mouth 3 (three) times a day    Improved relief of pain or improved ability to participate with IADLs with taking lyrica 75mg TID by at least 30%; Patient has completed formal PT with modest benefit. Unable to tolerate increased dose. Has since tapered medication given intractable nausea/vomiting with incrased dose of GLP1 medication. Patient has had recent ED visit with normal labwork, plans to follow up with pcp or report to urgent care/ED if still unresolved. Surgery still planned for 5/1/25; patient ot discontinue GLP1 agonist until after surgery. Continues to report the following symptomatology:     Axial low back pain described primarily by arthritic features.  Aching, nagging, indolent, stabbing, throbbing features in axial low back with left greater than right L3 radicular components. Physical exam showing weakness of the left lower extremity and ambulatory dysfunction. Positive facet loading maneuvers in lumbar spine elicited pain, positive tenderness to palpation over lumbar paraspinal muscles.  Findings correlate with prominent lumbar facet arthropathy seen throughout axial low back on imaging studies.Loosening of hardware noted by lucency on MRI lumbar spine. Currently she is neurologically intact without gait instability, saddle anesthesia or bowel/bladder abnormality. Scheduled for ortho spine intervention for ongoing symptoms related to pseudoarthrosis. Risks, benefits and alternatives to ALICIA vs med management thoroughly discussed with patient given multiple medical comorbidity.  Handouts provided questions answered to patient satisfaction.    Plan  -f/u 4 weeks for virtual visit  -lyrica decreased to 75 mg t.i.d. Ordered for patient; counseled regarding sedative effects of taking this medication and provided up titration calendar.  Counseled not to take  medication while driving or operating heavy machinery/using stairs  -has been particpant with formal physical therapy for lumbar radiculopathy; Physician directed home exercise plan as per [employer] demonstrated and handouts provided that patient plans to participate with for 1 hour, twice a week for the next 6 weeks.     There are risks associated with opioid medications, including dependence, addiction and tolerance. The patient understands and agrees to use these medications only as prescribed. Potential side effects of the medications include, but are not limited to, constipation, drowsiness, addiction, impaired judgment and risk of fatal overdose if not taken as prescribed. The patient was warned against driving while taking sedation medications or operating heavy machinery. The patient voiced understanding. Sharing medications is a felony. At this point in time, the patient is showing no signs of addiction, abuse, diversion or suicidal ideation.     [place] Prescription Drug Monitoring Program report was reviewed and was appropriate      Complete risks and benefits including bleeding, infection, tissue reaction, nerve injury and allergic reaction were discussed. The approach was demonstrated using models and literature was provided. Verbal and written consent was obtained.     My impressions and treatment recommendations were discussed in detail with the patient who verbalized understanding and had no further questions.  Discharge instructions were provided. I personally saw and examined the patient and I agree with the above discussed plan of care.                                               Review of external notes including PT notes, PCP notes and specialist notes was performed at this visit in addition to review of new ordered imaging and past imaging to develop or modify multidisciplinary pain plan    New Medications Ordered This Visit   Medications    pregabalin (LYRICA) 75 mg capsule     Sig: Take 1  capsule (75 mg total) by mouth 3 (three) times a day     Dispense:  90 capsule     Refill:  2       History of Present Illness    Vesna Langston is a 65 y.o. female with pmhx of CAD with ICD/pacemaker, obesity, prior L3-S1 fusion, on plavix and eliquis, DM-2 on insulin, CKD-4, presenting for initial visit with chief complaint of low back pain - referred by Dr. Mcrae for treatement options. Prior to her fusion she was treated by Dr. Ko and had good response to ESIs/Ablation in past. Pain began 2 years ago after an injury at work. She received a fusion of L3-S1 in 2023.  2 weeks after she return to work she suffered a twisting injury which caused her to return to her surgeon.  X-ray revealed loosening of multiple screws in her lumbar spine.  She was offered an SI injection to help with low back pain. 5 hours after the injection she suffered a heart attack in which a she received an ICD and stent. Today, she claims that she cannot feel her left leg. She attempts to walk on a treadmill for her cardiac rehab in which her left leg will give out 10-15 times during her hour long rehab. She reports that she is currently in physical therapy that is providing no relief. Describes pain as crushing in nature.  Located in low back.  + numbness . + burning. Denies fever or chills, no night sweats. Denies any bladder or bowel changes.      I have personally reviewed and/or updated the patient's past medical history, past surgical history, family history, social history, current medications, allergies, and vital signs today.     Review of Systems   Constitutional:  Positive for activity change.   HENT: Negative.     Eyes: Negative.    Respiratory: Negative.     Cardiovascular: Negative.    Gastrointestinal: Negative.    Endocrine: Negative.    Genitourinary: Negative.    Musculoskeletal:  Positive for arthralgias, back pain, gait problem and myalgias.   Skin: Negative.    Allergic/Immunologic: Negative.    Neurological:   Positive for weakness and numbness.   Hematological: Negative.    Psychiatric/Behavioral: Negative.     All other systems reviewed and are negative.      Patient Active Problem List   Diagnosis    Atrial flutter, paroxysmal ([facility])    Mixed hyperlipidemia    History of tobacco abuse    Ischemic cardiomyopathy    Chronic low back pain    HFrEF (heart failure with reduced ejection fraction) ([facility])    S/P ICD (internal cardiac defibrillator) procedure    Back pain    Sciatica of left side    History of sustained ventricular tachycardia    Coronary artery disease involving native coronary artery of native heart    S/P coronary artery stent placement    Type 2 diabetes mellitus, without long-term current use of insulin ([facility])    Acquired hypothyroidism    History of pulmonary embolism    History of gastric ulcer    Ganglion cyst of finger of left hand    Class 1 obesity due to excess calories with serious comorbidity and body mass index (BMI) of 34.0 to 34.9 in adult    POP (obstructive sleep apnea)    Localized edema    Primary osteoarthritis of right knee    History of torn meniscus of right knee    Chronic pain of right knee    Numbness and tingling in left arm    Stage 2 chronic kidney disease    Other pulmonary embolism without acute cor pulmonale, unspecified chronicity ([facility])    Acute on chronic systolic (congestive) heart failure ([facility])    Obesity, morbid ([facility])    Type 2 diabetes mellitus with stage 3 chronic kidney disease, without long-term current use of insulin, unspecified whether stage 3a or 3b CKD ([facility])    Other spondylosis with radiculopathy, lumbar region       Past Medical History:   Diagnosis Date    Acute respiratory insufficiency 03/22/2024    Allergic     Back pain 2022    Bradycardia 3/22/24    Breast cyst 2000    Chronic HFrEF (heart failure with reduced ejection fraction) ([facility])     Clotting disorder ([facility]) 4/1/24    Coronary artery disease     COVID-19 virus infection 11/28/2022    Diabetes mellitus  ([facility])     Disease of thyroid gland 4/09/2024    Environmental and seasonal allergies 1/1/1960    GERD (gastroesophageal reflux disease) 10/09    GI (gastrointestinal bleed) 4/1/24    Heart disease 3/24    History of placement of internal cardiac defibrillator 3/27/24    Hyperlipidemia     Hypoglycemia     ICD (implantable cardioverter-defibrillator) in place     Ischemic cardiomyopathy     Lactic acidosis 03/22/2024    Migraine 1980    Morning headache 1980    Myocardial infarction ([facility]) 3/22/2024    Nausea & vomiting 04/03/2025    Otitis media 1966    Pacemaker 3/27/24    Peptic ulceration 4/2024    Seasonal allergies     Sinus problem 1970    ST elevation myocardial infarction involving left anterior descending (LAD) coronary artery ([facility]) 03/22/2024    Tobacco abuse     Visual impairment 1967       Past Surgical History:   Procedure Laterality Date    ADENOIDECTOMY      APPENDECTOMY      APPENDECTOMY LAPAROSCOPIC N/A 05/08/2024    Procedure: APPENDECTOMY LAPAROSCOPIC;  Surgeon: Lauryn Ullrich, DO;  Location: [facility];  Service: General    BACK SURGERY  8/23    BREAST EXCISIONAL BIOPSY Right 09/2000    cyst removed- benign    CARDIAC CATHETERIZATION N/A 03/22/2024    Procedure: Cardiac pci;  Surgeon: Gabriel Myers MD;  [facility]: [facility];  Service: Cardiology    CARDIAC CATHETERIZATION N/A 03/22/2024    Procedure: Cardiac Coronary Angiogram;  Surgeon: Gabriel Myers MD;  [facility];  Service: Cardiology    CARDIAC CATHETERIZATION N/A 03/22/2024    Procedure: Cardiac PCI AMI;  Surgeon: Gabriel Myers MD;  [facility];  Service: Cardiology    CARDIAC CATHETERIZATION N/A 03/22/2024    Procedure: Cardiac IVUS;  Surgeon: Garbiel Myers MD;  [facility];  Service: Cardiology    CARDIAC DEFIBRILLATOR PLACEMENT      CARDIAC ELECTROPHYSIOLOGY PROCEDURE N/A 03/27/2024    Procedure: Cardiac icd implant;  Surgeon: Jeffy Lama MD;  [facility];   Service: Cardiology     SECTION      COLONOSCOPY      ECTOPIC PREGNANCY SURGERY      INSERT / REPLACE / REMOVE PACEMAKER      MASS EXCISION Left 10/18/2024    Procedure: EXCISION BIOPSY TISSUE LESION/MASS UPPER EXTREMITY - Left index finger;  Surgeon: Leandro Campos MD;  Location: [facility];  Service: Orthopedics    SEPTOPLASTY      SPINE SURGERY  23    TONSILLECTOMY      TONSILLECTOMY AND ADENOIDECTOMY  1964    TRIGGER POINT INJECTION      UPPER GASTROINTESTINAL ENDOSCOPY  24       Family History   Adopted: Yes   Problem Relation Age of Onset    Depression Mother     Sleep apnea Mother     Heart disease Father     Snoring Father     Restless legs syndrome Father     OCD Daughter        Social History     Occupational History    Not on file   Tobacco Use    Smoking status: Former     Current packs/day: 0.00     Average packs/day: 1 pack/day for 24.2 years (24.2 ttl pk-yrs)     Types: Cigarettes     Start date: 2000     Quit date: 3/22/2024     Years since quittin.0     Passive exposure: Never    Smokeless tobacco: Never    Tobacco comments:     Smoked 0.5-1 ppd; quit in 2024.    Vaping Use    Vaping status: Never Used   Substance and Sexual Activity    Alcohol use: Not Currently     Alcohol/week: 1.0 standard drink of alcohol     Types: 1 Shots of liquor per week     Comment: Every 2or 3months    Drug use: Never    Sexual activity: Not Currently     Partners: Male     Birth control/protection: Post-menopausal       Current Outpatient Medications on File Prior to Visit   Medication Sig    albuterol (ProAir HFA) 90 mcg/act inhaler Inhale 2 puffs every 6 (six) hours as needed for wheezing    apixaban (ELIQUIS) 5 mg Take 1 tablet (5 mg total) by mouth 2 (two) times a day    atorvastatin (LIPITOR) 80 mg tablet TAKE 1 TABLET BY MOUTH EVERY DAY IN THE EVENING    clopidogrel (PLAVIX) 75 mg tablet Take 1 tablet (75 mg total) by mouth daily    diphenhydrAMINE (BENADRYL) 25 mg capsule Take 25 mg  by mouth every 6 (six) hours as needed for itching    dofetilide (TIKOSYN) 125 mcg capsule Take 1 capsule (125 mcg total) by mouth every 12 (twelve) hours    Empagliflozin (Jardiance) 25 MG TABS Take 1 tablet (25 mg total) by mouth every morning    ergocalciferol (VITAMIN D2) 50,000 units Take 1 capsule (50,000 Units total) by mouth once a week    fluticasone (FLONASE) 50 mcg/act nasal spray 1 spray into each nostril daily    furosemide (LASIX) 20 mg tablet TAKE 2 TABLETS (40 MG TOTAL) BY MOUTH 2 (TWO) TIMES A DAY FOR 5 DAYS. THEN 2 TABLETS (40 MG TOTAL) IN THE AM & THEN 1 TABLET- 20 MG IN THE PM.    furosemide (LASIX) 40 mg tablet Take 1 tablet (40 mg total) by mouth if needed (if worse shortness of breath or weight up 3lbs)    isosorbide mononitrate (IMDUR) 30 mg 24 hr tablet TAKE 1 TABLET (30 MG TOTAL) BY MOUTH 2 (TWO) TIMES A DAY 30 MG IN THE AM AND 30 MG IN THE PM    levothyroxine (Synthroid) 75 mcg tablet Take 1 tablet (75 mcg total) by mouth daily    meclizine (ANTIVERT) 25 mg tablet Take 1 tablet (25 mg total) by mouth every 8 (eight) hours as needed for dizziness    metoprolol succinate (TOPROL-XL) 25 mg 24 hr tablet Take 2 tablets (50 mg total) by mouth daily at bedtime    Multiple Vitamin (multivitamin) tablet Take 1 tablet by mouth daily    nitroglycerin (NITROSTAT) 0.4 mg SL tablet PLACE 1 TABLET UNDER THE TONGUE EVERY 5 MINUTES AS NEEDED FOR CHEST PAIN. (Patient taking differently: As needed)    pantoprazole (PROTONIX) 40 mg tablet TAKE 1 TABLET BY MOUTH 2 TIMES A DAY BEFORE MEALS.    potassium chloride (Klor-Con M20) 20 mEq tablet Take 1 tablet (20 mEq total) by mouth if needed (on lasix days)    sacubitril-valsartan (Entresto) 49-51 MG TABS Take 1 tablet by mouth 2 (two) times a day    semaglutide, 1 mg/dose, (Ozempic) 4 mg/3 mL injection pen Inject 0.75 mL (1 mg total) under the skin once a week    amoxicillin (AMOXIL) 500 mg capsule Take 1 capsule (500 mg total) by mouth every 8 (eight) hours for 7  "days (Patient not taking: Reported on 4/10/2025)    Durolane 60 MG/3ML injection  (Patient not taking: Reported on 4/10/2025)    metoclopramide (Reglan) 10 mg tablet Take 1 tablet (10 mg total) by mouth every 6 (six) hours as needed (Nausea) for up to 10 doses (Patient not taking: Reported on 4/10/2025)    trimethobenzamide (TIGAN) 300 mg capsule Take 1 capsule (300 mg total) by mouth 3 (three) times a day as needed for nausea for up to 10 doses     No current facility-administered medications on file prior to visit.       Allergies   Allergen Reactions    Fish-Derived Products - Food Allergy Anaphylaxis     Cannot have all seafood products    Shellfish-Derived Products - Food Allergy Anaphylaxis    Azithromycin Hives and Rash         Physical Exam    Ht 5' 8\" (1.727 m)   Wt 96.6 kg (213 lb)   BMI 32.39 kg/m²     Constitutional: normal, well developed, well nourished, alert, in no distress and non-toxic and no overt pain behavior. and obese  Eyes: anicteric  HEENT: grossly intact  Neck: supple, symmetric, trachea midline and no masses   Pulmonary:even and unlabored  Cardiovascular:No edema or pitting edema present  Skin:Normal without rashes or lesions and well hydrated  Psychiatric:Mood and affect appropriate  Neurologic:Cranial Nerves II-XII grossly intact Sensation grossly intact; no clonus negative youngblood's. Reflexes 2+ and brisk. SLR negative bilaterally.   Musculoskeletal:normal gait. 3-4/5 strength with left hip flexion, otherwise 5/5 strength bilaterally with AROM in lower extremities. Normal heel toe and tip toe walking. Significant pain with lumbar facet loading bilaterally and with lateral spine rotation, ttp over lumbar paraspinal muscles. Negative pia's test, negative gaenslen's negative SIJ loading bilaterally.    Imaging    MRI LUMBAR SPINE WITHOUT CONTRAST     INDICATION: S32.009K: Unspecified fracture of unspecified lumbar vertebra, subsequent encounter for fracture with nonunion  M54.9: " Dorsalgia, unspecified  M54.16: Radiculopathy, lumbar region.     COMPARISON: Lumbar spine radiographs from 1/14/2025, CT abdomen pelvis from 5/7/2024     TECHNIQUE:  Multiplanar, multisequence imaging of the lumbar spine was performed. .        IMAGE QUALITY:  Diagnostic     FINDINGS:     VERTEBRAL BODIES:  There are 5 lumbar type vertebral bodies. Grade 1 anterolisthesis of L4 on L5 again demonstrated. Postsurgical changes from prior posterior lumbar spinal fusion procedures at L3-S1, with bilateral transpedicular screws, vertical rods.   Laminectomies at L3-S1 again demonstrated. No compression fractures identified. Normal marrow signal is identified within the visualized bony structures.  No discrete marrow lesion.     SACRUM:  Normal signal within the sacrum. No evidence of insufficiency or stress fracture.     DISTAL CORD AND CONUS:  Normal size and signal within the distal cord and conus. Conus terminates at T12-L1.     PARASPINAL SOFT TISSUES:  Paraspinal soft tissues are unremarkable.     LOWER THORACIC DISC SPACES:  Normal disc height and signal.  No disc herniation, canal stenosis or foraminal narrowing.     LUMBAR DISC SPACES:     L1-L2:  Normal.     L2-L3: Mild bilateral facet arthropathy. Otherwise normal.     L3-L4: Posterior fusion at this level, with laminectomies. Disc bulge with likely superimposed left central and subarticular disc protrusion. No canal stenosis. Mild left subarticular zone narrowing. Mild bilateral foraminal stenosis.     L4-L5: Anterolisthesis of L4 on L5. This level is fused posteriorly with laminectomies. Mild disc bulge. No canal stenosis. No foraminal stenosis identified.     L5-S1: This level is fused posteriorly with laminectomies. Disc height loss, disc osteophyte complex. Bilateral facet arthropathy. No canal stenosis. No foraminal stenosis identified.     OTHER FINDINGS: Lower pole left renal cyst for which no further imaging evaluation or follow-up is needed.      IMPRESSION:     Prior posterior lumbar spinal fusion procedure from L3-S1, with laminectomies at these levels.     Multilevel lumbar spondylosis, as described above, without canal stenosis, but there is mild left subarticular zone narrowing and mild bilateral foraminal stenosis at L3-L4 due to a left-sided disc protrusion at this level.

## 2025-04-11 NOTE — LETTER
April 11, 2025     Patient: Vesna Langston  YOB: 1960  Date of Visit: 4/11/2025      To Whom it May Concern:    Vesna Langston is under my professional care. Vesna was seen in my office on 4/11/2025. Vesna is currently scheduled to undergo a revision lumbar fusion surgery on 5/1/2025. She should remain out of work at this time until cleared by orthopedic surgeon.    If you have any questions or concerns, please don't hesitate to call.         Sincerely,          Charles Mcrae MD        CC: No Recipients

## 2025-04-11 NOTE — PATIENT INSTRUCTIONS
Patient Education     Pregabalin (pre CT a carlos)   Brand Names: US Lyrica; Lyrica CR   Brand Names: Lu[place]maria ACH-Pregabalin; AG-Pregabalin; APO-Pregabalin; Auro-Pregabalin; DOM-Pregabalin; JAMP-Pregabalin; Lyrica; M-Pregabalin; Mar-Pregabalin; MINT-Pregabalin; ALYSSA-Pregabalin; NRA-Pregabalin; PMS-Pregabalin; HIEU-Pregabalin; SANDOZ Pregabalin; TARO-Pregabalin; TEVA-Pregabalin   What is this drug used for?   It is used to help control certain kinds of seizures.  It is used to treat painful nerve diseases.  It is used to treat fibromyalgia.  It may be given to you for other reasons. Talk with the doctor.  What do I need to tell my doctor BEFORE I take this drug?   If you are allergic to this drug; any part of this drug; or any other drugs, foods, or substances. Tell your doctor about the allergy and what signs you had.  If you have kidney disease.  If you are breast-feeding. Do not breast-feed while you take this drug.  This is not a list of all drugs or health problems that interact with this drug.  Tell your doctor and pharmacist about all of your drugs (prescription or OTC, natural products, vitamins) and health problems. You must check to make sure that it is safe for you to take this drug with all of your drugs and health problems. Do not start, stop, or change the dose of any drug without checking with your doctor.  What are some things I need to know or do while I take this drug?   Tell all of your health care providers that you take this drug. This includes your doctors, nurses, pharmacists, and dentists.  Avoid driving and doing other tasks or actions that call for you to be alert or have clear eyesight until you see how this drug affects you.  If seizures are different or worse after starting this drug, talk with the doctor.  Do not stop taking this drug all of a sudden without calling your doctor. You may have a greater risk of side effects. If you need to stop this drug, you will want to slowly stop it as  ordered by your doctor.  Avoid drinking alcohol while taking this drug.  Talk with your doctor before you use marijuana, other forms of cannabis, or prescription or OTC drugs that may slow your actions.  A very bad reaction called angioedema has happened with this drug. Sometimes, this may be life-threatening. Signs may include swelling of the hands, face, lips, eyes, tongue, or throat; trouble breathing; trouble swallowing; or unusual hoarseness. Get medical help right away if you have any of these signs.  Severe breathing problems have happened with this drug in people taking certain other drugs (like opioid pain drugs). This has also happened in people who already have lung or breathing problems. The risk may also be greater in people who are older than 65. Sometimes, breathing problems have been deadly. If you have questions, talk with the doctor.  If you are 65 or older, use this drug with care. You could have more side effects.  Talk with your doctor if you plan to father a child. This drug made male animals less fertile and caused sperm changes. Birth defects also happened in the young of male animals treated with this drug. It is not known if these problems happen in humans.  Tell your doctor if you are pregnant or plan on getting pregnant. You will need to talk about the benefits and risks of using this drug while you are pregnant.  What are some side effects that I need to call my doctor about right away?   WARNING/CAUTION: Even though it may be rare, some people may have very bad and sometimes deadly side effects when taking a drug. Tell your doctor or get medical help right away if you have any of the following signs or symptoms that may be related to a very bad side effect:  Signs of an allergic reaction, like rash; hives; itching; red, swollen, blistered, or peeling skin with or without fever; wheezing; tightness in the chest or throat; trouble breathing, swallowing, or talking; unusual hoarseness; or  swelling of the mouth, face, lips, tongue, or throat.  Change in eyesight.  Muscle pain or weakness.  Change in balance.  Feeling confused.  Shakiness.  Trouble breathing, slow breathing, or shallow breathing.  Blue or gray color of the skin, lips, nail beds, fingers, or toes.  Memory problems or loss.  Shortness of breath, a big weight gain, or swelling in the arms or legs.  Fast or abnormal heartbeat.  Fever, chills, or sore throat.  Skin sores.  Any skin change.  Trouble speaking.  Trouble sleeping.  Trouble walking.  Feeling high (easy laughing and feeling good).  Twitching.  Get medical help right away if you feel very sleepy, very dizzy, or if you pass out. Caregivers or others need to get medical help right away if the patient does not respond, does not answer or react like normal, or will not wake up.  Like other drugs that may be used for seizures, this drug may rarely raise the risk of suicidal thoughts or actions. The risk may be higher in people who have had suicidal thoughts or actions in the past. Call the doctor right away about any new or worse signs like depression; feeling nervous, restless, or grouchy; panic attacks; or other changes in mood or behavior. Call the doctor right away if any suicidal thoughts or actions occur.  Low platelet counts have rarely happened with this drug. This may lead to a higher chance of bleeding. Call your doctor right away if you have any unexplained bruising or bleeding.  What are some other side effects of this drug?   All drugs may cause side effects. However, many people have no side effects or only have minor side effects. Call your doctor or get medical help if any of these side effects or any other side effects bother you or do not go away:  Feeling dizzy, sleepy, tired, or weak.  Weight gain.  Not able to focus.  Headache.  Dry mouth.  Constipation.  Increased appetite.  Upset stomach.  Joint pain.  Nose or throat irritation.  These are not all of the side  effects that may occur. If you have questions about side effects, call your doctor. Call your doctor for medical advice about side effects.  You may report side effects to your national health agency.  You may report side effects to the FDA at 1-849.580.1426. You may also report side effects at https://www.fda.gov/medwatch.  How is this drug best taken?   Use this drug as ordered by your doctor. Read all information given to you. Follow all instructions closely.  Extended-release tablets:   Take after the evening meal if taking once daily.  Swallow whole. Do not chew, break, or crush.  Keep taking this drug as you have been told by your doctor or other health care provider, even if you feel well.  Capsules and oral solution:   Take with or without food.  Keep taking this drug as you have been told by your doctor or other health care provider, even if you feel well.  Oral solution:   Measure liquid doses carefully. Use the measuring device that comes with this drug. If there is none, ask the pharmacist for a device to measure this drug.  What do I do if I miss a dose?   Extended-release tablets:   Take a missed dose just before bedtime after eating a snack or after the next day's morning meal.  If you miss taking the missed dose after the next day's morning meal, skip the missed dose and go back to your normal time.  Do not take 2 doses at the same time or extra doses.  Capsules and oral solution:   Take a missed dose as soon as you think about it.  If it is close to the time for your next dose, skip the missed dose and go back to your normal time.  Do not take 2 doses at the same time or extra doses.  How do I store and/or throw out this drug?   Store in the original container at room temperature.  Store in a dry place. Do not store in a bathroom.  Store this drug in a safe place where children cannot see or reach it, and where other people cannot get to it. A locked box or area may help keep this drug safe. Keep  all drugs away from pets.  Throw away unused or  drugs. Do not flush down a toilet or pour down a drain unless you are told to do so. Check with your pharmacist if you have questions about the best way to throw out drugs. There may be drug take-back programs in your area.  General drug facts   If your symptoms or health problems do not get better or if they become worse, call your doctor.  Do not share your drugs with others and do not take anyone else's drugs.  Some drugs may have another patient information leaflet. If you have any questions about this drug, please talk with your doctor, nurse, pharmacist, or other health care provider.  This drug comes with an extra patient fact sheet called a Medication Guide. Read it with care. Read it again each time this drug is refilled. If you have any questions about this drug, please talk with the doctor, pharmacist, or other health care provider.  If you think there has been an overdose, call your poison control center or get medical care right away. Be ready to tell or show what was taken, how much, and when it happened.  Consumer Information Use and Disclaimer   This generalized information is a limited summary of diagnosis, treatment, and/or medication information. It is not meant to be comprehensive and should be used as a tool to help the user understand and/or assess potential diagnostic and treatment options. It does NOT include all information about conditions, treatments, medications, side effects, or risks that may apply to a specific patient. It is not intended to be medical advice or a substitute for the medical advice, diagnosis, or treatment of a health care provider based on the health care provider's examination and assessment of a patient's specific and unique circumstances. Patients must speak with a health care provider for complete information about their health, medical questions, and treatment options, including any risks or benefits  regarding use of medications. This information does not endorse any treatments or medications as safe, effective, or approved for treating a specific patient. [employer] and its affiliates disclaim any warranty or liability relating to this information or the use thereof. The use of this information is governed by the Terms of Use, available at https://www.[employer].com/en/know/clinical-effectiveness-terms.  Last Reviewed Date   2023-12-21  Copyright   © 2024 [employer], [employer] and its affiliates and/or licensors. All rights reserved.

## 2025-04-22 ENCOUNTER — PROCEDURE VISIT (OUTPATIENT)
Dept: OBGYN CLINIC | Facility: CLINIC | Age: 65
End: 2025-04-22
Payer: COMMERCIAL

## 2025-04-22 VITALS — HEIGHT: 68 IN | WEIGHT: 212 LBS | BODY MASS INDEX: 32.13 KG/M2

## 2025-04-22 DIAGNOSIS — M25.461 PAIN AND SWELLING OF RIGHT KNEE: ICD-10-CM

## 2025-04-22 DIAGNOSIS — M25.561 PAIN AND SWELLING OF RIGHT KNEE: ICD-10-CM

## 2025-04-22 DIAGNOSIS — G89.29 CHRONIC PAIN OF RIGHT KNEE: Primary | ICD-10-CM

## 2025-04-22 DIAGNOSIS — M25.561 CHRONIC PAIN OF RIGHT KNEE: Primary | ICD-10-CM

## 2025-04-22 DIAGNOSIS — M17.11 PRIMARY OSTEOARTHRITIS OF RIGHT KNEE: ICD-10-CM

## 2025-04-22 DIAGNOSIS — M23.300 DEGENERATIVE TEAR OF LATERAL MENISCUS OF RIGHT KNEE: ICD-10-CM

## 2025-04-22 PROCEDURE — 20610 DRAIN/INJ JOINT/BURSA W/O US: CPT | Performed by: STUDENT IN AN ORGANIZED HEALTH CARE EDUCATION/TRAINING PROGRAM

## 2025-04-22 NOTE — PROGRESS NOTES
1. Chronic pain of right knee  Large joint arthrocentesis: R knee      2. Primary osteoarthritis of right knee  Large joint arthrocentesis: R knee      3. Degenerative tear of lateral meniscus of right knee  Large joint arthrocentesis: R knee      4. Pain and swelling of right knee  Large joint arthrocentesis: R knee          Large joint arthrocentesis: R knee  [place] Protocol:  procedure performed by consultantConsent: Verbal consent obtained.  Risks and benefits: risks, benefits and alternatives were discussed  Consent given by: patient  Patient understanding: patient states understanding of the procedure being performed  Site marked: the operative site was marked  Radiology Images displayed and confirmed. If images not available, report reviewed: imaging studies available  Patient identity confirmed: verbally with patient  Supporting Documentation  Indications: pain and diagnostic evaluation     Is this a Visco injection? Yes  Non-Pharmacologic Treatments Attempted: Home Exercise and Diet  Pharmacologic Treatments Attempted: Tylenol; recommended against nsaids d/t cardio medical history  Pain Score: 8Procedure Details  Location: knee - R knee  Preparation: Patient was prepped and draped in the usual sterile fashion  Needle size: 22 G  Ultrasound guidance: no  Approach: anterolateral  Medications administered: 3 mL sodium hyaluronate 60 MG/3ML    Patient tolerance: patient tolerated the procedure well with no immediate complications  Dressing:  Sterile dressing applied    Post injection, became nauseous, lightheaded. Patient subsequently vomited food material (reports it was her breakfast with eggs). Patient was answering questions appropriately during this incident. After vomiting, lightheadedness and nausea improved. She was able to stand and walk on her own. We offered wheelchair assistance to exit office but patient declined

## 2025-04-23 ENCOUNTER — OFFICE VISIT (OUTPATIENT)
Dept: FAMILY MEDICINE CLINIC | Facility: CLINIC | Age: 65
End: 2025-04-23
Payer: COMMERCIAL

## 2025-04-23 ENCOUNTER — TELEPHONE (OUTPATIENT)
Age: 65
End: 2025-04-23

## 2025-04-23 VITALS
OXYGEN SATURATION: 8 % | DIASTOLIC BLOOD PRESSURE: 58 MMHG | WEIGHT: 209 LBS | SYSTOLIC BLOOD PRESSURE: 100 MMHG | BODY MASS INDEX: 31.78 KG/M2 | HEART RATE: 85 BPM

## 2025-04-23 DIAGNOSIS — M25.561 ACUTE PAIN OF RIGHT KNEE: Primary | ICD-10-CM

## 2025-04-23 PROCEDURE — 99213 OFFICE O/P EST LOW 20 MIN: CPT | Performed by: FAMILY MEDICINE

## 2025-04-23 RX ORDER — HYDROCODONE BITARTRATE AND ACETAMINOPHEN 5; 325 MG/1; MG/1
1 TABLET ORAL EVERY 6 HOURS PRN
Status: ON HOLD | COMMUNITY

## 2025-04-23 NOTE — TELEPHONE ENCOUNTER
"Called and spoke w/pt and she had Visco injection yesterday and had episode of vomiting afterwards. States she feels better today however her right knee is swollen twice the size it was, stiff (\"feels like it is in a cast\") and cannot bend it or stand on it; no drainage from injections site although there was some blood on bandaid that was removed and some bruising at site. She is taking Tylenol arthritis for pain as she is on Eliquis and cannot take NSAIDs. She is in pain and is going to see her PCP at 10AM as her body is telling her she needs something more for pain and cannot get in to see Dr Sanchez until tomorrow. She is currently in Aqua therapy and cannot move knee. She is back to using a walker. She also tried Lido patch last night and that did not help. Was icing as well and using knee sleeve. Advised rest, ice, compression, elevation, OTC tylenol as allowed, offload walker/cane as she has both. Will forward to Dr Sanchez for review and advice. Can contact pt via phone or MYCHART.   "

## 2025-04-23 NOTE — PRE-PROCEDURE INSTRUCTIONS
Pre-Surgery Instructions:   Medication Instructions    albuterol (ProAir HFA) 90 mcg/act inhaler Uses PRN- OK to take day of surgery    apixaban (ELIQUIS) 5 mg Instructions provided by MD    atorvastatin (LIPITOR) 80 mg tablet Take as directed    clopidogrel (PLAVIX) 75 mg tablet Instructions provided by MD. LD 4/23/25    diphenhydrAMINE (BENADRYL) 25 mg capsule Uses PRN- DO NOT take day of surgery    dofetilide (TIKOSYN) 125 mcg capsule Take day of surgery.    Empagliflozin (Jardiance) 25 MG TABS Stop taking 4 days prior to surgery. LD 4/26    ergocalciferol (VITAMIN D2) 50,000 units Stop taking 7 days prior to surgery.    fluticasone (FLONASE) 50 mcg/act nasal spray Uses PRN- OK to take day of surgery    furosemide (LASIX) 20 mg tablet Hold day of surgery.    furosemide (LASIX) 40 mg tablet Hold day of surgery.    isosorbide mononitrate (IMDUR) 30 mg 24 hr tablet Take as directed    levothyroxine (Synthroid) 75 mcg tablet Take day of surgery.    meclizine (ANTIVERT) 25 mg tablet Uses PRN- OK to take day of surgery    metoprolol succinate (TOPROL-XL) 25 mg 24 hr tablet Take as directed    Multiple Vitamin (multivitamin) tablet Stop taking 7 days prior to surgery.    nitroglycerin (NITROSTAT) 0.4 mg SL tablet Uses PRN- OK to take day of surgery    pantoprazole (PROTONIX) 40 mg tablet Hold day of surgery.    potassium chloride (Klor-Con M20) 20 mEq tablet Hold day of surgery.    sacubitril-valsartan (Entresto) 49-51 MG TABS Hold day of surgery.    semaglutide, 1 mg/dose, (Ozempic) 4 mg/3 mL injection pen Stop taking 7 days prior to surgery. LD 4/9/25        Medication instructions for day of surgery reviewed. Please take all instructed medications with only a sip of water.       You will receive a call one business day prior to surgery with an arrival time and hospital directions. If your surgery is scheduled on a Monday, the hospital will be calling you on the Friday prior to your surgery. If you have not heard from  anyone by 8pm, please call the hospital supervisor through the hospital  at [phone]. ([place] [phone] or [place] [phone]).    Do not eat or drink anything after midnight the night before your surgery, including candy, mints, lifesavers, or chewing gum. Do not drink alcohol 24hrs before your surgery. Try not to smoke at least 24hrs before your surgery.       Follow the pre surgery showering instructions as listed in the “My Surgical Experience Booklet” or otherwise provided by your surgeon's office. Do not use a blade to shave the surgical area 1 week before surgery. It is okay to use a clean electric clippers up to 24 hours before surgery. Do not apply any lotions, creams, including makeup, cologne, deodorant, or perfumes after showering on the day of your surgery. Do not use dry shampoo, hair spray, hair gel, or any type of hair products.     No contact lenses, eye make-up, or artificial eyelashes. Remove nail polish, including gel polish, and any artificial, gel, or acrylic nails if possible. Remove all jewelry including rings and body piercing jewelry.     Wear causal clothing that is easy to take on and off. Consider your type of surgery.    Keep any valuables, jewelry, piercings at home. Please bring any specially ordered equipment (sling, braces) if indicated.    Arrange for a responsible person to drive you to and from the hospital on the day of your surgery. Please confirm the visitor policy for the day of your procedure when you receive your phone call with an arrival time.     Call the surgeon's office with any new illnesses, exposures, or additional questions prior to surgery.    Please reference your “My Surgical Experience Booklet” for additional information to prepare for your upcoming surgery.

## 2025-04-23 NOTE — TELEPHONE ENCOUNTER
Caller: Patient    Doctor: Dr. Sanchez    Reason for call: Patient calling stating that since she had the injection she is experiencing a significant amount of pain. She is having difficult time standing and ambulating.  She was unable to sleep last night.  Patient wondering if something can be prescribed to her pain.  She does have appt with PCP today.  Patient unable to take NSAIDS.  Patient asking to speak to clinical team.   Scheduled with Dr. Sanchez for tomorrow in [place].     Call back#: [phone]

## 2025-04-23 NOTE — PROGRESS NOTES
Name: Vesna Langston      : 1960      MRN: [id number]  Encounter Provider: Melissa Montoya DO  Encounter Date: 2025   Encounter department: [facility] PR[facility]  :  Assessment & Plan  Acute pain of right knee  With swelling and tenderness on exam  Seems to be painful reaction to the Visco injection from yesterday   No redness or erythema  Patient has Hydrocodone-acetaminophen 5-325mg darrell home - recommend she take this today for the significant pain, and continue with tylenol prn   She has follow up with ortho tomorrow to further assess  Continue compression and ice as needed           Return for Next scheduled follow up.       History of Present Illness   Chief Complaint   Patient presents with    Follow-up     Knee pain, patient had a knee injection yesterday at [place] and having a bad reactions.       Knee Pain     Patient presents with right knee pain and swelling.  She was seen with Ortho yesterday and had a Visco knee injection.  She did have a vasovagal response following the injection but recovered well in the office and was able to leave the office on her own.  She started having pain last night and noticed some worsening swelling this morning.  She denies any redness or warmth.  She has been wearing a knee brace and taking Tylenol with not much relief.  She is icing off and on.  She does have some hydrocodone-acetaminophen leftover at home from a couple months ago but has not taken it yet.  She wanted to see me first and discuss.      Review of Systems   Musculoskeletal:  Positive for arthralgias and joint swelling.       Objective   /58 (BP Location: Right arm, Patient Position: Sitting, Cuff Size: Standard)   Pulse 85   Wt 94.8 kg (209 lb)   SpO2 (!) 8%   BMI 31.78 kg/m²      Physical Exam  Musculoskeletal:         General: Swelling (Right knee) and tenderness (right knee) present.      Right lower leg: No edema.      Left lower leg: No edema.       Comments: Right knee pain and swelling without erythema.    Skin:     Findings: No erythema or rash.

## 2025-04-24 ENCOUNTER — ANESTHESIA EVENT (OUTPATIENT)
Dept: PERIOP | Facility: HOSPITAL | Age: 65
DRG: 214 | End: 2025-04-24
Payer: OTHER MISCELLANEOUS

## 2025-04-24 ENCOUNTER — OFFICE VISIT (OUTPATIENT)
Age: 65
End: 2025-04-24
Payer: COMMERCIAL

## 2025-04-24 VITALS — BODY MASS INDEX: 31.52 KG/M2 | WEIGHT: 208 LBS | HEIGHT: 68 IN

## 2025-04-24 DIAGNOSIS — M25.561 ACUTE PAIN OF RIGHT KNEE: Primary | ICD-10-CM

## 2025-04-24 DIAGNOSIS — M17.11 PRIMARY OSTEOARTHRITIS OF RIGHT KNEE: ICD-10-CM

## 2025-04-24 LAB

## 2025-04-24 PROCEDURE — 99213 OFFICE O/P EST LOW 20 MIN: CPT | Performed by: STUDENT IN AN ORGANIZED HEALTH CARE EDUCATION/TRAINING PROGRAM

## 2025-04-24 PROCEDURE — 20610 DRAIN/INJ JOINT/BURSA W/O US: CPT | Performed by: STUDENT IN AN ORGANIZED HEALTH CARE EDUCATION/TRAINING PROGRAM

## 2025-04-24 RX ORDER — BUPIVACAINE HYDROCHLORIDE 2.5 MG/ML
4 INJECTION, SOLUTION INFILTRATION; PERINEURAL
Status: COMPLETED | OUTPATIENT
Start: 2025-04-24 | End: 2025-04-24

## 2025-04-24 RX ADMIN — BUPIVACAINE HYDROCHLORIDE 4 ML: 2.5 INJECTION, SOLUTION INFILTRATION; PERINEURAL at 11:00

## 2025-04-24 NOTE — PROGRESS NOTES
Name: Vesna Langston      : 1960      MRN: [id number]  Encounter Provider: Timmy Sanchez MD  Encounter Date: 2025   Encounter department: [facility] [place]      Assessment & Plan  Acute pain of right knee  Suspected reaction from early Visco injection as it can initially cause intra-articular knee pain, stiffness as it can take up to 3 to 4 weeks to take full effect.  I counseled patient at this point the only thing we can try to help reduce some degree of pain is to try an intra-articular anesthetic injection.  Patient contraindicated for taking NSAIDs/prednisone due to medical history.  I did briefly scan with the ultrasound today if there was a significant amount of effusion but there only appears to be a small amount of effusion that I could attempt to aspirate.  We did discuss potentially trying but patient prefers to try the anesthetic injection today.  Patient notes in the future she will likely want to try the 3 shot series rather than the 1 shot Visco series given the amount of pain she really experienced recently.  Orders:    Large joint arthrocentesis: R knee    bupivacaine (MARCAINE) 0.25 % injection 4 mL    Primary osteoarthritis of right knee    Orders:    Large joint arthrocentesis: R knee    bupivacaine (MARCAINE) 0.25 % injection 4 mL         Return if symptoms worsen or fail to improve.    History of Present Illness       Chief Complaint   Patient presents with    Right Knee - Follow-up       HPI:  Vesna Langston is a 65 y.o. female  who presents for     Visit 2025:  Follow-up right knee pain:  Of note patient had Visco injection/Durling of her right knee on 2025.  Procedure was complicated as patient had a vasovagal reaction, lightheadedness, vomited.  Patient was able to recover and was able to walk out on her own.  She did mention at the time that her right knee was much more sore than when she had done prior Visco injection in the past.   Intra-articular anesthetic injection was offered but ultimately deferred at the time of this visit.  She states since this injection she feels she has been having persistent pain of her right knee, stiffness.  Denies any discoloration, F/C.  She has been using Tylenol and applying ice consistently.  She has seen her PCP yesterday as well and her note was reviewed from 4/23/2025.  She has been using a cane for mobility.          Past Medical History:   Diagnosis Date    Acute respiratory insufficiency 03/22/2024    Allergic     Anxiety 4/24    Arrhythmia 3/22/24    Atrial fibrillation (HCC) 3/22/24    Back pain 2022    Bradycardia 3/22/24    Breast cyst 2000    Chronic HFrEF (heart failure with reduced ejection fraction) ([facility])     Clotting disorder ([facility]) 4/1/24    Coronary artery disease     COVID-19 virus infection 11/28/2022    Depression 4/24    Diabetes mellitus ([facility])     Disease of thyroid gland 4/09/2024    Environmental and seasonal allergies 1/1/1960    GERD (gastroesophageal reflux disease) 10/09    GI (gastrointestinal bleed) 4/1/24    Heart disease 3/24    History of placement of internal cardiac defibrillator 3/27/24    Hyperlipidemia     Hypoglycemia     ICD (implantable cardioverter-defibrillator) in place     Ischemic cardiomyopathy     Lactic acidosis 03/22/2024    Migraine 1980    Morning headache 1980    Myocardial infarction ([facility]) 3/22/2024    Nausea & vomiting 04/03/2025    Otitis media 1966    Pacemaker 3/27/24    Peptic ulceration 4/2024    Seasonal allergies     Sinus problem 1970    ST elevation myocardial infarction involving left anterior descending (LAD) coronary artery ([facility]) 03/22/2024    Tobacco abuse     Visual impairment 1967       Past Surgical History:   Procedure Laterality Date    ADENOIDECTOMY      APPENDECTOMY      APPENDECTOMY LAPAROSCOPIC N/A 05/08/2024    Procedure: APPENDECTOMY LAPAROSCOPIC;  Surgeon: Lauryn Ullrich, DO;  Location: [facility];  Service: General    BACK SURGERY       BREAST EXCISIONAL BIOPSY Right 2000    cyst removed- benign    CARDIAC CATHETERIZATION N/A 2024    Procedure: Cardiac pci;  Surgeon: Gabriel Myers MD;  [facility]: [facility];  Service: Cardiology    CARDIAC CATHETERIZATION N/A 2024    Procedure: Cardiac Coronary Angiogram;  Surgeon: Gabriel Myers MD;  [facility];  Service: Cardiology    CARDIAC CATHETERIZATION N/A 2024    Procedure: Cardiac PCI AMI;  Surgeon: Gabriel Myers MD;  [facility];  Service: Cardiology    CARDIAC CATHETERIZATION N/A 2024    Procedure: Cardiac IVUS;  Surgeon: Gabriel Myers MD;  [facility];  Service: Cardiology    CARDIAC DEFIBRILLATOR PLACEMENT      CARDIAC ELECTROPHYSIOLOGY PROCEDURE N/A 2024    Procedure: Cardiac icd implant;  Surgeon: Jeffy Lama MD;  [facility];  Service: Cardiology     SECTION      COLONOSCOPY      ECTOPIC PREGNANCY SURGERY      INSERT / REPLACE / REMOVE PACEMAKER      MASS EXCISION Left 10/18/2024    Procedure: EXCISION BIOPSY TISSUE LESION/MASS UPPER EXTREMITY - Left index finger;  Surgeon: Leandro Campos MD;  Location:  MAIN OR;  Service: Orthopedics    SEPTOPLASTY      SPINE SURGERY  23    TONSILLECTOMY      TONSILLECTOMY AND ADENOIDECTOMY  1964    TRIGGER POINT INJECTION      UPPER GASTROINTESTINAL ENDOSCOPY  24       Current Outpatient Medications on File Prior to Visit   Medication Sig Dispense Refill    albuterol (ProAir HFA) 90 mcg/act inhaler Inhale 2 puffs every 6 (six) hours as needed for wheezing 8.5 g 0    apixaban (ELIQUIS) 5 mg Take 1 tablet (5 mg total) by mouth 2 (two) times a day 180 tablet 1    atorvastatin (LIPITOR) 80 mg tablet TAKE 1 TABLET BY MOUTH EVERY DAY IN THE EVENING 90 tablet 1    diphenhydrAMINE (BENADRYL) 25 mg capsule Take 25 mg by mouth every 6 (six) hours as needed for itching      dofetilide (TIKOSYN) 125 mcg capsule Take 1 capsule (125 mcg  total) by mouth every 12 (twelve) hours 180 capsule 3    Empagliflozin (Jardiance) 25 MG TABS Take 1 tablet (25 mg total) by mouth every morning 30 tablet 17    fluticasone (FLONASE) 50 mcg/act nasal spray 1 spray into each nostril daily 9.9 mL 0    furosemide (LASIX) 40 mg tablet Take 1 tablet (40 mg total) by mouth if needed (if worse shortness of breath or weight up 3lbs) 60 tablet 5    HYDROcodone-acetaminophen (NORCO) 5-325 mg per tablet Take 1 tablet by mouth every 6 (six) hours as needed for pain      isosorbide mononitrate (IMDUR) 30 mg 24 hr tablet TAKE 1 TABLET (30 MG TOTAL) BY MOUTH 2 (TWO) TIMES A DAY 30 MG IN THE AM AND 30 MG IN THE  tablet 1    levothyroxine (Synthroid) 75 mcg tablet Take 1 tablet (75 mcg total) by mouth daily 90 tablet 3    meclizine (ANTIVERT) 25 mg tablet Take 1 tablet (25 mg total) by mouth every 8 (eight) hours as needed for dizziness 15 tablet 0    metoprolol succinate (TOPROL-XL) 25 mg 24 hr tablet Take 2 tablets (50 mg total) by mouth daily at bedtime 180 tablet 5    Multiple Vitamin (multivitamin) tablet Take 1 tablet by mouth daily      pantoprazole (PROTONIX) 40 mg tablet TAKE 1 TABLET BY MOUTH 2 TIMES A DAY BEFORE MEALS. 180 tablet 1    potassium chloride (Klor-Con M20) 20 mEq tablet Take 1 tablet (20 mEq total) by mouth if needed (on lasix days) 90 tablet 1    sacubitril-valsartan (Entresto) 49-51 MG TABS Take 1 tablet by mouth 2 (two) times a day 60 tablet 5    semaglutide, 1 mg/dose, (Ozempic) 4 mg/3 mL injection pen Inject 0.75 mL (1 mg total) under the skin once a week 9 mL 1    clopidogrel (PLAVIX) 75 mg tablet Take 1 tablet (75 mg total) by mouth daily (Patient not taking: Reported on 4/24/2025) 90 tablet 3    ergocalciferol (VITAMIN D2) 50,000 units Take 1 capsule (50,000 Units total) by mouth once a week (Patient not taking: Reported on 4/24/2025) 12 capsule 1    furosemide (LASIX) 20 mg tablet TAKE 2 TABLETS (40 MG TOTAL) BY MOUTH 2 (TWO) TIMES A DAY FOR 5  "DAYS. THEN 2 TABLETS (40 MG TOTAL) IN THE AM & THEN 1 TABLET- 20 MG IN THE PM. (Patient not taking: Reported on 2025) 190 tablet 1    nitroglycerin (NITROSTAT) 0.4 mg SL tablet PLACE 1 TABLET UNDER THE TONGUE EVERY 5 MINUTES AS NEEDED FOR CHEST PAIN. (Patient not taking: Reported on 2025) 25 tablet 1    pregabalin (LYRICA) 75 mg capsule Take 1 capsule (75 mg total) by mouth 3 (three) times a day (Patient not taking: Reported on 2025) 90 capsule 2     No current facility-administered medications on file prior to visit.        Social History     Objective     Ht 5' 8\" (1.727 m)   Wt 94.3 kg (208 lb)   BMI 31.63 kg/m²     Constitutional:  see vital signs  Gen: well-developed, normocephalic/atraumatic, well-groomed  Pulmonary/Chest: Effort normal. No respiratory distress.      Timmy Sanchez MD     Ortho Exam  Right knee exam:  Inspection: 1+ edema, no erythema, ecchymosis, open wounds  Palpation: Positive tenderness along the medial lateral joint lines  ROM: -; patient reports worsening pain and stiffness when attempting to flex further  Strength: Extension/flexion 5 -/5  Gait: Antalgic with use of cane              Imaging Studies (I personally reviewed images in PACS and report):  X-ray right knee 2024: Mild tricompartmental osteophytic changes evidence by marginal osteophytes. Superior patellar enthesophyte from quadriceps tendon. Small joint effusion.     There is an old MRI of her right knee from 2017 notin.  There appears to be a degenerative type tear involving the anterior horn of the lateral meniscus near the midline.  There is a prominent chondral defect involving the midportion of the lateral femoral condyle measuring 1.2 cm without underlying bone marrow edema.  There is mild spurring along the lateral joint line.    2.  There is a low-grade horizontal cleavage tear at the posterior horn of the medial meniscus.  There is minimal fissuring of the articular cartilage of " "the medial compartment.    3.  There is mild degeneration of the articular cartilage of the medial patellar facet and medial trochlea.    4.  There is a knee joint effusion which is likely reactive to the degenerative changes.    5.  Ganglion cysts are associated with the origin of the medial and lateral heads of the gastro knee mass, with the PCL insertion and with the ACL insertion.    Large joint arthrocentesis: R knee  [place] Protocol:  procedure performed by consultantConsent: Verbal consent obtained.  Risks and benefits: risks, benefits and alternatives were discussed  Consent given by: patient  Patient understanding: patient states understanding of the procedure being performed  Site marked: the operative site was marked  Radiology Images displayed and confirmed. If images not available, report reviewed: imaging studies available  Patient identity confirmed: verbally with patient  Supporting Documentation  Indications: pain and diagnostic evaluation     Is this a Visco injection? NoProcedure Details  Location: knee - R knee  Preparation: Patient was prepped and draped in the usual sterile fashion  Needle size: 22 G  Ultrasound guidance: no  Approach: anterolateral  Medications administered: 4 mL bupivacaine 0.25 %    Patient tolerance: patient tolerated the procedure well with no immediate complications  Dressing:  Sterile dressing applied          -----------------------------------------------------------------  Portions of the record may have been created with voice recognition software. Occasional wrong word or \"sound a like\" substitutions may have occurred due to the inherent limitations of voice recognition software. Read the chart carefully and recognize, using context, where substitutions have occurred.     "

## 2025-04-25 ENCOUNTER — TELEPHONE (OUTPATIENT)
Dept: ADMINISTRATIVE | Facility: HOSPITAL | Age: 65
End: 2025-04-25

## 2025-04-30 ENCOUNTER — TELEPHONE (OUTPATIENT)
Age: 65
End: 2025-04-30

## 2025-04-30 NOTE — TELEPHONE ENCOUNTER
I confirmed with Dr. Mcrae that patient will be admitted and I relayed this to pt. She was very appreciative of the call back.

## 2025-04-30 NOTE — TELEPHONE ENCOUNTER
Caller: Patient    Doctor: Dr. Mcrae    Reason for call: Patient called has surgery scheduled tomorrow 5/01,  patient stated when she received appointment time for surgery she was advised she will be going home same day of surgery and has questions if she will be admitted. Please advise.    Call back#: [phone]

## 2025-05-01 ENCOUNTER — HOSPITAL ENCOUNTER (INPATIENT)
Facility: HOSPITAL | Age: 65
LOS: 5 days | DRG: 214 | End: 2025-05-06
Attending: ORTHOPAEDIC SURGERY | Admitting: ORTHOPAEDIC SURGERY
Payer: OTHER MISCELLANEOUS

## 2025-05-01 ENCOUNTER — HOSPITAL ENCOUNTER (OUTPATIENT)
Dept: RADIOLOGY | Facility: HOSPITAL | Age: 65
Discharge: HOME | End: 2025-05-01
Payer: COMMERCIAL

## 2025-05-01 ENCOUNTER — ANESTHESIA (OUTPATIENT)
Dept: PERIOP | Facility: HOSPITAL | Age: 65
DRG: 214 | End: 2025-05-01
Payer: OTHER MISCELLANEOUS

## 2025-05-01 DIAGNOSIS — I48.92 ATRIAL FLUTTER, PAROXYSMAL (HCC): ICD-10-CM

## 2025-05-01 DIAGNOSIS — Z98.1 HISTORY OF LUMBAR SPINAL FUSION: ICD-10-CM

## 2025-05-01 DIAGNOSIS — N18.2 STAGE 2 CHRONIC KIDNEY DISEASE: ICD-10-CM

## 2025-05-01 DIAGNOSIS — I50.20 HFREF (HEART FAILURE WITH REDUCED EJECTION FRACTION) (HCC): ICD-10-CM

## 2025-05-01 DIAGNOSIS — G47.33 OSA (OBSTRUCTIVE SLEEP APNEA): ICD-10-CM

## 2025-05-01 DIAGNOSIS — Z95.810 S/P ICD (INTERNAL CARDIAC DEFIBRILLATOR) PROCEDURE: Primary | ICD-10-CM

## 2025-05-01 DIAGNOSIS — I25.10 CORONARY ARTERY DISEASE INVOLVING NATIVE CORONARY ARTERY OF NATIVE HEART WITHOUT ANGINA PECTORIS: ICD-10-CM

## 2025-05-01 DIAGNOSIS — E78.2 MIXED HYPERLIPIDEMIA: ICD-10-CM

## 2025-05-01 DIAGNOSIS — E11.9 TYPE 2 DIABETES MELLITUS WITHOUT COMPLICATION, WITHOUT LONG-TERM CURRENT USE OF INSULIN (HCC): ICD-10-CM

## 2025-05-01 LAB
GLUCOSE SERPL-MCNC: 132 MG/DL (ref 65–140)
GLUCOSE SERPL-MCNC: 170 MG/DL (ref 65–140)
GLUCOSE SERPL-MCNC: 195 MG/DL (ref 65–140)
GLUCOSE SERPL-MCNC: 263 MG/DL (ref 65–140)

## 2025-05-01 PROCEDURE — C1713 ANCHOR/SCREW BN/BN,TIS/BN: HCPCS | Performed by: ORTHOPAEDIC SURGERY

## 2025-05-01 PROCEDURE — 61783 SCAN PROC SPINAL: CPT | Performed by: ORTHOPAEDIC SURGERY

## 2025-05-01 PROCEDURE — 72020 X-RAY EXAM OF SPINE 1 VIEW: CPT

## 2025-05-01 PROCEDURE — 0QP004Z REMOVAL OF INTERNAL FIXATION DEVICE FROM LUMBAR VERTEBRA, OPEN APPROACH: ICD-10-PCS | Performed by: ORTHOPAEDIC SURGERY

## 2025-05-01 PROCEDURE — 0SG30AJ FUSION OF LUMBOSACRAL JOINT WITH INTERBODY FUSION DEVICE, POSTERIOR APPROACH, ANTERIOR COLUMN, OPEN APPROACH: ICD-10-PCS | Performed by: ORTHOPAEDIC SURGERY

## 2025-05-01 PROCEDURE — 99024 POSTOP FOLLOW-UP VISIT: CPT | Performed by: ORTHOPAEDIC SURGERY

## 2025-05-01 PROCEDURE — 0SG10AJ FUSION OF 2 OR MORE LUMBAR VERTEBRAL JOINTS WITH INTERBODY FUSION DEVICE, POSTERIOR APPROACH, ANTERIOR COLUMN, OPEN APPROACH: ICD-10-PCS | Performed by: ORTHOPAEDIC SURGERY

## 2025-05-01 PROCEDURE — NC001 PR NO CHARGE: Performed by: ORTHOPAEDIC SURGERY

## 2025-05-01 PROCEDURE — 82948 REAGENT STRIP/BLOOD GLUCOSE: CPT

## 2025-05-01 PROCEDURE — C1781 MESH (IMPLANTABLE): HCPCS | Performed by: ORTHOPAEDIC SURGERY

## 2025-05-01 PROCEDURE — 99252 IP/OBS CONSLTJ NEW/EST SF 35: CPT | Performed by: STUDENT IN AN ORGANIZED HEALTH CARE EDUCATION/TRAINING PROGRAM

## 2025-05-01 PROCEDURE — 20930 SP BONE ALGRFT MORSEL ADD-ON: CPT | Performed by: ORTHOPAEDIC SURGERY

## 2025-05-01 PROCEDURE — 20936 SP BONE AGRFT LOCAL ADD-ON: CPT | Performed by: ORTHOPAEDIC SURGERY

## 2025-05-01 PROCEDURE — 22842 INSERT SPINE FIXATION DEVICE: CPT | Performed by: ORTHOPAEDIC SURGERY

## 2025-05-01 PROCEDURE — 01NB0ZZ RELEASE LUMBAR NERVE, OPEN APPROACH: ICD-10-PCS | Performed by: ORTHOPAEDIC SURGERY

## 2025-05-01 PROCEDURE — 0QP104Z REMOVAL OF INTERNAL FIXATION DEVICE FROM SACRUM, OPEN APPROACH: ICD-10-PCS | Performed by: ORTHOPAEDIC SURGERY

## 2025-05-01 PROCEDURE — 63052 LAM FACETC/FRMT ARTHRD LUM 1: CPT | Performed by: ORTHOPAEDIC SURGERY

## 2025-05-01 PROCEDURE — 22614 ARTHRD PST TQ 1NTRSPC EA ADD: CPT | Performed by: ORTHOPAEDIC SURGERY

## 2025-05-01 PROCEDURE — 0SG307J FUSION OF LUMBOSACRAL JOINT WITH AUTOLOGOUS TISSUE SUBSTITUTE, POSTERIOR APPROACH, ANTERIOR COLUMN, OPEN APPROACH: ICD-10-PCS | Performed by: ORTHOPAEDIC SURGERY

## 2025-05-01 PROCEDURE — 22633 ARTHRD CMBN 1NTRSPC LUMBAR: CPT | Performed by: ORTHOPAEDIC SURGERY

## 2025-05-01 PROCEDURE — 0SB20ZZ EXCISION OF LUMBAR VERTEBRAL DISC, OPEN APPROACH: ICD-10-PCS | Performed by: ORTHOPAEDIC SURGERY

## 2025-05-01 PROCEDURE — 22853 INSJ BIOMECHANICAL DEVICE: CPT | Performed by: ORTHOPAEDIC SURGERY

## 2025-05-01 DEVICE — GRAFT DBF WITH DELIVERY TUBES 12ML: Type: IMPLANTABLE DEVICE | Site: SPINE LUMBAR | Status: FUNCTIONAL

## 2025-05-01 DEVICE — BONE GRAFT KIT 7510200 INFUSE SMALL
Type: IMPLANTABLE DEVICE | Site: SPINE LUMBAR | Status: FUNCTIONAL
Brand: INFUSE® BONE GRAFT

## 2025-05-01 DEVICE — SCREW 55840007540 5.5/6 MAS 7.5X40 CC
Type: IMPLANTABLE DEVICE | Site: SPINE LUMBAR | Status: FUNCTIONAL
Brand: CD HORIZON® SPINAL SYSTEM

## 2025-05-01 DEVICE — I-FACTOR PUTTY, 2.5CC SYRINGE
Type: IMPLANTABLE DEVICE | Site: SPINE LUMBAR | Status: FUNCTIONAL
Brand: I-FACTOR PEPTIDE ENHANCED BONE GRAFT

## 2025-05-01 DEVICE — DBM T42275 8MMX1CMX10CM 2 EACH GRAFTON M
Type: IMPLANTABLE DEVICE | Site: SPINE LUMBAR | Status: FUNCTIONAL
Brand: GRAFTON®AND GRAFTON PLUS®DEMINERALIZED BONE MATRIX (DBM)

## 2025-05-01 DEVICE — SPACER 3992208 22MM X 8MM
Type: IMPLANTABLE DEVICE | Site: SPINE LUMBAR | Status: FUNCTIONAL
Brand: CAPSTONE PTC™ SPINAL SYSTEM

## 2025-05-01 DEVICE — GRAFT BONE CRUSHED CANC CHIPS 0.1-4MM 30ML FREEZE DRIED: Type: IMPLANTABLE DEVICE | Site: SPINE LUMBAR | Status: FUNCTIONAL

## 2025-05-01 RX ORDER — OXYCODONE HYDROCHLORIDE 10 MG/1
10 TABLET ORAL EVERY 4 HOURS PRN
Refills: 0 | Status: DISCONTINUED | OUTPATIENT
Start: 2025-05-01 | End: 2025-05-06 | Stop reason: HOSPADM

## 2025-05-01 RX ORDER — MAGNESIUM HYDROXIDE/ALUMINUM HYDROXICE/SIMETHICONE 120; 1200; 1200 MG/30ML; MG/30ML; MG/30ML
30 SUSPENSION ORAL EVERY 6 HOURS PRN
Status: DISCONTINUED | OUTPATIENT
Start: 2025-05-01 | End: 2025-05-06 | Stop reason: HOSPADM

## 2025-05-01 RX ORDER — SUCCINYLCHOLINE/SOD CL,ISO/PF 100 MG/5ML
SYRINGE (ML) INTRAVENOUS AS NEEDED
Status: DISCONTINUED | OUTPATIENT
Start: 2025-05-01 | End: 2025-05-01

## 2025-05-01 RX ORDER — VANCOMYCIN HYDROCHLORIDE 1 G/20ML
INJECTION, POWDER, LYOPHILIZED, FOR SOLUTION INTRAVENOUS AS NEEDED
Status: DISCONTINUED | OUTPATIENT
Start: 2025-05-01 | End: 2025-05-01 | Stop reason: HOSPADM

## 2025-05-01 RX ORDER — FENTANYL CITRATE 50 UG/ML
INJECTION, SOLUTION INTRAMUSCULAR; INTRAVENOUS AS NEEDED
Status: DISCONTINUED | OUTPATIENT
Start: 2025-05-01 | End: 2025-05-01

## 2025-05-01 RX ORDER — SODIUM CHLORIDE, SODIUM LACTATE, POTASSIUM CHLORIDE, CALCIUM CHLORIDE 600; 310; 30; 20 MG/100ML; MG/100ML; MG/100ML; MG/100ML
INJECTION, SOLUTION INTRAVENOUS CONTINUOUS PRN
Status: DISCONTINUED | OUTPATIENT
Start: 2025-05-01 | End: 2025-05-01

## 2025-05-01 RX ORDER — CALCIUM CARBONATE 500 MG/1
1000 TABLET, CHEWABLE ORAL DAILY PRN
Status: DISCONTINUED | OUTPATIENT
Start: 2025-05-01 | End: 2025-05-06 | Stop reason: HOSPADM

## 2025-05-01 RX ORDER — ONDANSETRON 2 MG/ML
4 INJECTION INTRAMUSCULAR; INTRAVENOUS ONCE AS NEEDED
Status: COMPLETED | OUTPATIENT
Start: 2025-05-01 | End: 2025-05-01

## 2025-05-01 RX ORDER — ONDANSETRON 2 MG/ML
INJECTION INTRAMUSCULAR; INTRAVENOUS AS NEEDED
Status: DISCONTINUED | OUTPATIENT
Start: 2025-05-01 | End: 2025-05-01

## 2025-05-01 RX ORDER — SENNOSIDES 8.6 MG
1 TABLET ORAL DAILY
Status: DISCONTINUED | OUTPATIENT
Start: 2025-05-02 | End: 2025-05-06 | Stop reason: HOSPADM

## 2025-05-01 RX ORDER — PROPOFOL 10 MG/ML
INJECTION, EMULSION INTRAVENOUS AS NEEDED
Status: DISCONTINUED | OUTPATIENT
Start: 2025-05-01 | End: 2025-05-01

## 2025-05-01 RX ORDER — HEPARIN SODIUM 5000 [USP'U]/ML
5000 INJECTION, SOLUTION INTRAVENOUS; SUBCUTANEOUS EVERY 12 HOURS SCHEDULED
Status: DISCONTINUED | OUTPATIENT
Start: 2025-05-02 | End: 2025-05-02

## 2025-05-01 RX ORDER — ONDANSETRON 2 MG/ML
4 INJECTION INTRAMUSCULAR; INTRAVENOUS EVERY 6 HOURS PRN
Status: DISCONTINUED | OUTPATIENT
Start: 2025-05-01 | End: 2025-05-06 | Stop reason: HOSPADM

## 2025-05-01 RX ORDER — PREGABALIN 75 MG/1
75 CAPSULE ORAL 3 TIMES DAILY
Status: DISCONTINUED | OUTPATIENT
Start: 2025-05-01 | End: 2025-05-06 | Stop reason: HOSPADM

## 2025-05-01 RX ORDER — FENTANYL CITRATE/PF 50 MCG/ML
25 SYRINGE (ML) INJECTION
Status: COMPLETED | OUTPATIENT
Start: 2025-05-01 | End: 2025-05-01

## 2025-05-01 RX ORDER — DOFETILIDE 0.12 MG/1
125 CAPSULE ORAL EVERY 12 HOURS SCHEDULED
Status: DISCONTINUED | OUTPATIENT
Start: 2025-05-01 | End: 2025-05-06 | Stop reason: HOSPADM

## 2025-05-01 RX ORDER — ISOSORBIDE MONONITRATE 30 MG/1
30 TABLET, EXTENDED RELEASE ORAL 2 TIMES DAILY
Status: DISCONTINUED | OUTPATIENT
Start: 2025-05-01 | End: 2025-05-06 | Stop reason: HOSPADM

## 2025-05-01 RX ORDER — CHLORHEXIDINE GLUCONATE ORAL RINSE 1.2 MG/ML
15 SOLUTION DENTAL ONCE
Status: COMPLETED | OUTPATIENT
Start: 2025-05-01 | End: 2025-05-01

## 2025-05-01 RX ORDER — HYDROMORPHONE HCL/PF 1 MG/ML
0.5 SYRINGE (ML) INJECTION
Status: DISCONTINUED | OUTPATIENT
Start: 2025-05-01 | End: 2025-05-01 | Stop reason: HOSPADM

## 2025-05-01 RX ORDER — HYDROMORPHONE HCL/PF 1 MG/ML
0.5 SYRINGE (ML) INJECTION
Status: DISCONTINUED | OUTPATIENT
Start: 2025-05-01 | End: 2025-05-01

## 2025-05-01 RX ORDER — HYDROMORPHONE HCL/PF 1 MG/ML
SYRINGE (ML) INJECTION AS NEEDED
Status: DISCONTINUED | OUTPATIENT
Start: 2025-05-01 | End: 2025-05-01

## 2025-05-01 RX ORDER — SODIUM CHLORIDE, SODIUM LACTATE, POTASSIUM CHLORIDE, CALCIUM CHLORIDE 600; 310; 30; 20 MG/100ML; MG/100ML; MG/100ML; MG/100ML
125 INJECTION, SOLUTION INTRAVENOUS CONTINUOUS
Status: DISCONTINUED | OUTPATIENT
Start: 2025-05-01 | End: 2025-05-04

## 2025-05-01 RX ORDER — CEFAZOLIN SODIUM 2 G/50ML
2000 SOLUTION INTRAVENOUS EVERY 8 HOURS
Status: COMPLETED | OUTPATIENT
Start: 2025-05-01 | End: 2025-05-02

## 2025-05-01 RX ORDER — ROCURONIUM BROMIDE 10 MG/ML
INJECTION, SOLUTION INTRAVENOUS AS NEEDED
Status: DISCONTINUED | OUTPATIENT
Start: 2025-05-01 | End: 2025-05-01

## 2025-05-01 RX ORDER — ALBUMIN HUMAN 50 G/1000ML
SOLUTION INTRAVENOUS CONTINUOUS PRN
Status: DISCONTINUED | OUTPATIENT
Start: 2025-05-01 | End: 2025-05-01

## 2025-05-01 RX ORDER — CEFAZOLIN SODIUM 2 G/50ML
2000 SOLUTION INTRAVENOUS ONCE
Status: COMPLETED | OUTPATIENT
Start: 2025-05-01 | End: 2025-05-01

## 2025-05-01 RX ORDER — DOCUSATE SODIUM 100 MG/1
100 CAPSULE, LIQUID FILLED ORAL 2 TIMES DAILY
Status: DISCONTINUED | OUTPATIENT
Start: 2025-05-01 | End: 2025-05-06 | Stop reason: HOSPADM

## 2025-05-01 RX ORDER — SODIUM CHLORIDE 9 MG/ML
INJECTION, SOLUTION INTRAVENOUS CONTINUOUS PRN
Status: DISCONTINUED | OUTPATIENT
Start: 2025-05-01 | End: 2025-05-01

## 2025-05-01 RX ORDER — METHOCARBAMOL 500 MG/1
500 TABLET, FILM COATED ORAL EVERY 6 HOURS SCHEDULED
Status: DISCONTINUED | OUTPATIENT
Start: 2025-05-01 | End: 2025-05-03

## 2025-05-01 RX ORDER — OXYCODONE HYDROCHLORIDE 5 MG/1
5 TABLET ORAL EVERY 4 HOURS PRN
Refills: 0 | Status: DISCONTINUED | OUTPATIENT
Start: 2025-05-01 | End: 2025-05-06 | Stop reason: HOSPADM

## 2025-05-01 RX ORDER — DEXAMETHASONE SODIUM PHOSPHATE 10 MG/ML
INJECTION, SOLUTION INTRAMUSCULAR; INTRAVENOUS AS NEEDED
Status: DISCONTINUED | OUTPATIENT
Start: 2025-05-01 | End: 2025-05-01

## 2025-05-01 RX ORDER — BUPIVACAINE HYDROCHLORIDE 2.5 MG/ML
INJECTION, SOLUTION EPIDURAL; INFILTRATION; INTRACAUDAL; PERINEURAL AS NEEDED
Status: DISCONTINUED | OUTPATIENT
Start: 2025-05-01 | End: 2025-05-01 | Stop reason: HOSPADM

## 2025-05-01 RX ORDER — ATORVASTATIN CALCIUM 80 MG/1
80 TABLET, FILM COATED ORAL EVERY EVENING
Status: DISCONTINUED | OUTPATIENT
Start: 2025-05-01 | End: 2025-05-06 | Stop reason: HOSPADM

## 2025-05-01 RX ORDER — METOPROLOL SUCCINATE 50 MG/1
50 TABLET, EXTENDED RELEASE ORAL
Status: DISCONTINUED | OUTPATIENT
Start: 2025-05-01 | End: 2025-05-06 | Stop reason: HOSPADM

## 2025-05-01 RX ORDER — KETAMINE HCL IN NACL, ISO-OSM 100MG/10ML
SYRINGE (ML) INJECTION AS NEEDED
Status: DISCONTINUED | OUTPATIENT
Start: 2025-05-01 | End: 2025-05-01

## 2025-05-01 RX ORDER — MAGNESIUM HYDROXIDE 1200 MG/15ML
LIQUID ORAL AS NEEDED
Status: DISCONTINUED | OUTPATIENT
Start: 2025-05-01 | End: 2025-05-01 | Stop reason: HOSPADM

## 2025-05-01 RX ORDER — PROPOFOL 10 MG/ML
INJECTION, EMULSION INTRAVENOUS CONTINUOUS PRN
Status: DISCONTINUED | OUTPATIENT
Start: 2025-05-01 | End: 2025-05-01

## 2025-05-01 RX ORDER — MIDAZOLAM HYDROCHLORIDE 2 MG/2ML
INJECTION, SOLUTION INTRAMUSCULAR; INTRAVENOUS AS NEEDED
Status: DISCONTINUED | OUTPATIENT
Start: 2025-05-01 | End: 2025-05-01

## 2025-05-01 RX ORDER — FLUTICASONE PROPIONATE 50 MCG
1 SPRAY, SUSPENSION (ML) NASAL DAILY
Status: DISCONTINUED | OUTPATIENT
Start: 2025-05-02 | End: 2025-05-06 | Stop reason: HOSPADM

## 2025-05-01 RX ORDER — TRANEXAMIC ACID 10 MG/ML
1000 INJECTION, SOLUTION INTRAVENOUS ONCE
Status: COMPLETED | OUTPATIENT
Start: 2025-05-01 | End: 2025-05-01

## 2025-05-01 RX ORDER — LIDOCAINE HYDROCHLORIDE 10 MG/ML
INJECTION, SOLUTION EPIDURAL; INFILTRATION; INTRACAUDAL; PERINEURAL AS NEEDED
Status: DISCONTINUED | OUTPATIENT
Start: 2025-05-01 | End: 2025-05-01

## 2025-05-01 RX ORDER — ACETAMINOPHEN 325 MG/1
650 TABLET ORAL EVERY 6 HOURS SCHEDULED
Status: DISCONTINUED | OUTPATIENT
Start: 2025-05-01 | End: 2025-05-06 | Stop reason: HOSPADM

## 2025-05-01 RX ORDER — PANTOPRAZOLE SODIUM 40 MG/1
40 TABLET, DELAYED RELEASE ORAL
Status: DISCONTINUED | OUTPATIENT
Start: 2025-05-01 | End: 2025-05-06 | Stop reason: HOSPADM

## 2025-05-01 RX ORDER — LEVOTHYROXINE SODIUM 75 UG/1
75 TABLET ORAL DAILY
Status: DISCONTINUED | OUTPATIENT
Start: 2025-05-02 | End: 2025-05-06 | Stop reason: HOSPADM

## 2025-05-01 RX ADMIN — FENTANYL CITRATE 25 MCG: 50 INJECTION INTRAMUSCULAR; INTRAVENOUS at 17:09

## 2025-05-01 RX ADMIN — ONDANSETRON 4 MG: 2 INJECTION, SOLUTION INTRAMUSCULAR; INTRAVENOUS at 15:56

## 2025-05-01 RX ADMIN — ROCURONIUM BROMIDE 30 MG: 10 INJECTION, SOLUTION INTRAVENOUS at 11:28

## 2025-05-01 RX ADMIN — Medication 100 MG: at 10:35

## 2025-05-01 RX ADMIN — PREGABALIN 75 MG: 75 CAPSULE ORAL at 18:37

## 2025-05-01 RX ADMIN — NOREPINEPHRINE BITARTRATE 16 MCG: 1 INJECTION, SOLUTION, CONCENTRATE INTRAVENOUS at 11:54

## 2025-05-01 RX ADMIN — NOREPINEPHRINE BITARTRATE 16 MCG: 1 INJECTION, SOLUTION, CONCENTRATE INTRAVENOUS at 10:56

## 2025-05-01 RX ADMIN — DOCUSATE SODIUM 100 MG: 100 CAPSULE, LIQUID FILLED ORAL at 18:37

## 2025-05-01 RX ADMIN — PROPOFOL 50 MG: 10 INJECTION, EMULSION INTRAVENOUS at 11:20

## 2025-05-01 RX ADMIN — Medication 30 MG: at 11:40

## 2025-05-01 RX ADMIN — REMIFENTANIL HYDROCHLORIDE 0.15 MCG/KG/MIN: 1 INJECTION, POWDER, LYOPHILIZED, FOR SOLUTION INTRAVENOUS at 10:55

## 2025-05-01 RX ADMIN — OXYCODONE HYDROCHLORIDE 10 MG: 5 TABLET ORAL at 23:02

## 2025-05-01 RX ADMIN — REMIFENTANIL HYDROCHLORIDE 0.15 MCG/KG/MIN: 1 INJECTION, POWDER, LYOPHILIZED, FOR SOLUTION INTRAVENOUS at 11:17

## 2025-05-01 RX ADMIN — FENTANYL CITRATE 50 MCG: 50 INJECTION INTRAMUSCULAR; INTRAVENOUS at 11:35

## 2025-05-01 RX ADMIN — FENTANYL CITRATE 25 MCG: 50 INJECTION INTRAMUSCULAR; INTRAVENOUS at 16:36

## 2025-05-01 RX ADMIN — DEXAMETHASONE SODIUM PHOSPHATE 10 MG: 10 INJECTION, SOLUTION INTRAMUSCULAR; INTRAVENOUS at 10:35

## 2025-05-01 RX ADMIN — DOFETILIDE 125 MCG: 0.12 CAPSULE ORAL at 22:04

## 2025-05-01 RX ADMIN — PROPOFOL 20 MG: 10 INJECTION, EMULSION INTRAVENOUS at 16:27

## 2025-05-01 RX ADMIN — SODIUM CHLORIDE: 0.9 INJECTION, SOLUTION INTRAVENOUS at 15:00

## 2025-05-01 RX ADMIN — OXYCODONE HYDROCHLORIDE 5 MG: 5 TABLET ORAL at 18:48

## 2025-05-01 RX ADMIN — FENTANYL CITRATE 25 MCG: 50 INJECTION INTRAMUSCULAR; INTRAVENOUS at 17:27

## 2025-05-01 RX ADMIN — ISOSORBIDE MONONITRATE 30 MG: 30 TABLET, EXTENDED RELEASE ORAL at 21:38

## 2025-05-01 RX ADMIN — ALBUMIN (HUMAN): 12.5 INJECTION, SOLUTION INTRAVENOUS at 12:33

## 2025-05-01 RX ADMIN — NOREPINEPHRINE BITARTRATE 8 MCG: 1 INJECTION, SOLUTION, CONCENTRATE INTRAVENOUS at 15:32

## 2025-05-01 RX ADMIN — ONDANSETRON 4 MG: 2 INJECTION, SOLUTION INTRAMUSCULAR; INTRAVENOUS at 17:17

## 2025-05-01 RX ADMIN — HYDROMORPHONE HYDROCHLORIDE 0.5 MG: 1 INJECTION, SOLUTION INTRAMUSCULAR; INTRAVENOUS; SUBCUTANEOUS at 12:01

## 2025-05-01 RX ADMIN — CEFAZOLIN SODIUM 2000 MG: 2 SOLUTION INTRAVENOUS at 23:02

## 2025-05-01 RX ADMIN — PROPOFOL 60 MCG/KG/MIN: 10 INJECTION, EMULSION INTRAVENOUS at 11:15

## 2025-05-01 RX ADMIN — Medication 10 MG: at 14:00

## 2025-05-01 RX ADMIN — FENTANYL CITRATE 25 MCG: 50 INJECTION INTRAMUSCULAR; INTRAVENOUS at 17:33

## 2025-05-01 RX ADMIN — TRANEXAMIC ACID 1000 MG: 10 INJECTION, SOLUTION INTRAVENOUS at 11:26

## 2025-05-01 RX ADMIN — SODIUM CHLORIDE, SODIUM LACTATE, POTASSIUM CHLORIDE, AND CALCIUM CHLORIDE 125 ML/HR: .6; .31; .03; .02 INJECTION, SOLUTION INTRAVENOUS at 18:43

## 2025-05-01 RX ADMIN — CEFAZOLIN SODIUM 2000 MG: 2 SOLUTION INTRAVENOUS at 11:20

## 2025-05-01 RX ADMIN — ATORVASTATIN CALCIUM 80 MG: 80 TABLET, FILM COATED ORAL at 18:37

## 2025-05-01 RX ADMIN — LIDOCAINE HYDROCHLORIDE 50 MG: 10 INJECTION, SOLUTION EPIDURAL; INFILTRATION; INTRACAUDAL; PERINEURAL at 10:35

## 2025-05-01 RX ADMIN — NOREPINEPHRINE BITARTRATE 2 MCG/MIN: 1 INJECTION, SOLUTION, CONCENTRATE INTRAVENOUS at 10:52

## 2025-05-01 RX ADMIN — PROPOFOL 120 MCG/KG/MIN: 10 INJECTION, EMULSION INTRAVENOUS at 10:55

## 2025-05-01 RX ADMIN — PROPOFOL 150 MG: 10 INJECTION, EMULSION INTRAVENOUS at 10:35

## 2025-05-01 RX ADMIN — SODIUM CHLORIDE, SODIUM LACTATE, POTASSIUM CHLORIDE, AND CALCIUM CHLORIDE 125 ML/HR: .6; .31; .03; .02 INJECTION, SOLUTION INTRAVENOUS at 10:00

## 2025-05-01 RX ADMIN — ACETAMINOPHEN 650 MG: 325 TABLET, FILM COATED ORAL at 18:38

## 2025-05-01 RX ADMIN — SODIUM CHLORIDE: 0.9 INJECTION, SOLUTION INTRAVENOUS at 10:41

## 2025-05-01 RX ADMIN — METOPROLOL SUCCINATE 50 MG: 50 TABLET, EXTENDED RELEASE ORAL at 21:38

## 2025-05-01 RX ADMIN — HYDROMORPHONE HYDROCHLORIDE 0.5 MG: 1 INJECTION, SOLUTION INTRAMUSCULAR; INTRAVENOUS; SUBCUTANEOUS at 17:50

## 2025-05-01 RX ADMIN — NOREPINEPHRINE BITARTRATE 8 MCG: 1 INJECTION, SOLUTION, CONCENTRATE INTRAVENOUS at 15:29

## 2025-05-01 RX ADMIN — NOREPINEPHRINE BITARTRATE 8 MCG: 1 INJECTION, SOLUTION, CONCENTRATE INTRAVENOUS at 14:25

## 2025-05-01 RX ADMIN — PANTOPRAZOLE SODIUM 40 MG: 40 TABLET, DELAYED RELEASE ORAL at 18:38

## 2025-05-01 RX ADMIN — METHOCARBAMOL 500 MG: 500 TABLET ORAL at 18:37

## 2025-05-01 RX ADMIN — FENTANYL CITRATE 50 MCG: 50 INJECTION INTRAMUSCULAR; INTRAVENOUS at 16:56

## 2025-05-01 RX ADMIN — ACETAMINOPHEN 650 MG: 325 TABLET, FILM COATED ORAL at 23:01

## 2025-05-01 RX ADMIN — SODIUM CHLORIDE, SODIUM LACTATE, POTASSIUM CHLORIDE, AND CALCIUM CHLORIDE: .6; .31; .03; .02 INJECTION, SOLUTION INTRAVENOUS at 14:28

## 2025-05-01 RX ADMIN — Medication 10 MG: at 13:00

## 2025-05-01 RX ADMIN — CEFAZOLIN SODIUM 2000 MG: 2 SOLUTION INTRAVENOUS at 15:23

## 2025-05-01 RX ADMIN — FENTANYL CITRATE 50 MCG: 50 INJECTION INTRAMUSCULAR; INTRAVENOUS at 10:35

## 2025-05-01 RX ADMIN — ALBUMIN (HUMAN): 12.5 INJECTION, SOLUTION INTRAVENOUS at 15:37

## 2025-05-01 RX ADMIN — FENTANYL CITRATE 25 MCG: 50 INJECTION INTRAMUSCULAR; INTRAVENOUS at 17:40

## 2025-05-01 RX ADMIN — MIDAZOLAM 2 MG: 1 INJECTION INTRAMUSCULAR; INTRAVENOUS at 10:27

## 2025-05-01 RX ADMIN — SODIUM CHLORIDE, SODIUM LACTATE, POTASSIUM CHLORIDE, AND CALCIUM CHLORIDE: .6; .31; .03; .02 INJECTION, SOLUTION INTRAVENOUS at 10:16

## 2025-05-01 RX ADMIN — CHLORHEXIDINE GLUCONATE 15 ML: 1.2 SOLUTION ORAL at 09:54

## 2025-05-01 RX ADMIN — INSULIN HUMAN 5 UNITS: 100 INJECTION, SOLUTION PARENTERAL at 14:06

## 2025-05-01 NOTE — PROGRESS NOTES
Progress Note - Orthopedics   Name: Vesna Langston 65 y.o. female I MRN: [id number]  Unit/Bed#: OR POOL I Date of Admission: 5/1/2025   Date of Service: 5/1/2025 I Hospital Day: 0    Assessment & Plan  Status post lumbar spinal fusion  Status post Bilateral Navigated revision L3-S1 laminectomy/decompression, revision L3-S1 posterior instrumented spinal fusion with TLIF Date of Surgery 05/01/25    WBAT BLLE  PT/OT  Incentive spirometry  Pain control  DVT ppx: SQH  Diet: regular  Must wear LSO brace when out of bed  Lumbar spine precautions  Monitor drain output  Monitor for acute blood loss anemia and administer or recommend IVF/prbc as indicated for greater than 2 gram Hgb drop or Hgb < 7  Appreciate medicine team for medical comanagement  Dispo planning    Atrial flutter, paroxysmal (HCC)    HFrEF (heart failure with reduced ejection fraction) (HCC)  Wt Readings from Last 3 Encounters:   05/01/25 94.3 kg (208 lb)   04/24/25 94.3 kg (208 lb)   04/23/25 94.8 kg (209 lb)             Acquired hypothyroidism    Class 1 obesity due to excess calories with serious comorbidity and body mass index (BMI) of 34.0 to 34.9 in adult    Other pulmonary embolism without acute cor pulmonale, unspecified chronicity (HCC)      Please contact the SecureChat role for the Orthopedics service with any questions/concerns.    Subjective   No acute events, no acute distress. Denies fever/chills, SOB. Pain well controlled. Patient initially stating in PACU that she cannot feel or move her bilateral lower extremities however after repeated prompting and guiding examination she was found to be motor and sensory intact    Objective      Temp:  [96.8 °F (36 °C)-97.4 °F (36.3 °C)] 97.4 °F (36.3 °C)  HR:  [73-82] 77  BP: (107-131)/(60-84) 117/63  Resp:  [16-27] 23  SpO2:  [98 %-100 %] 100 %  O2 Device: Simple mask  O2 Device: Simple mask          I/O         04/29 0701  04/30 0700 04/30 0701  05/01 0700 05/01 0701  05/02 0700    I.V. (mL/kg)   2450  "(26)    IV Piggyback   500    Total Intake(mL/kg)   2950 (31.3)    Urine (mL/kg/hr)   1695    Total Output   1695    Net   +1255                 Lines/Drains/Airways       Active Status       Name Placement date Placement time Site Days    Closed/Suction Drain Left Back Bulb 19 Fr. 05/01/25  1615  Back  less than 1    Urethral Catheter Latex 16 Fr. 05/01/25  1052  Latex  less than 1                  Physical Exam   Musculoskeletal: Bilateral Lower Extremity  Dressing C/D/I  TTP suzanna-incisionally  Sensation intact to light touch L3-S1 -some reported reduced nondermatomal sensation about the left knee  5/5 hip flexion, 4/5 knee flex, 4/5 knee ext, 3/5 ankle DF, 3/5 ankle PF, 3/5 EHL, 3/5 FHL  2+ DP pulse  No calf swelling or ttp  DARWIN drain in place with serosanguineous output      Lab Results: I have reviewed the following results:  No results for input(s): \"WBC\", \"HGB\", \"HCT\", \"PLT\", \"BANDSPCT\", \"BUN\", \"CREATININE\", \"PTT\", \"INR\", \"ESR\", \"CRP\" in the last 72 hours.  Blood Culture:  No results found for: \"BLOODCX\"  Wound Culture: No results found for: \"WOUNDCULT\"      "

## 2025-05-01 NOTE — ANESTHESIA PREPROCEDURE EVALUATION
Procedure:  Navigated revision L3-S1 laminectomy/decompression, revision L3-S1 posterior instrumented spinal fusion with TLIF (Bilateral: Spine Lumbar)    Relevant Problems   CARDIO   (+) Atrial flutter, paroxysmal (HCC)   (+) Coronary artery disease involving native coronary artery of native heart   (+) Mixed hyperlipidemia   (+) Other pulmonary embolism without acute cor pulmonale, unspecified chronicity (HCC)      ENDO   (+) Acquired hypothyroidism   (+) Type 2 diabetes mellitus with stage 3 chronic kidney disease, without long-term current use of insulin, unspecified whether stage 3a or 3b CKD (HCC)   (+) Type 2 diabetes mellitus, without long-term current use of insulin (HCC)      /RENAL   (+) Stage 2 chronic kidney disease      MUSCULOSKELETAL   (+) Back pain   (+) Chronic low back pain   (+) Primary osteoarthritis of right knee   (+) Sciatica of left side      NEURO/PSYCH   (+) Chronic low back pain   (+) Numbness and tingling in left arm      PULMONARY   (+) POP (obstructive sleep apnea)      Previous LVEF 30% s/p AICD    TTE 10/8/24  LVEF 40-45%, RV normal, RVSP 26    Lab Results   Component Value Date    WBC 9.63 04/10/2025    HGB 13.5 04/10/2025    HCT 44.1 04/10/2025    MCV 84 04/10/2025     04/10/2025     Lab Results   Component Value Date    K 4.2 04/10/2025    CO2 20 (L) 04/10/2025     04/10/2025    BUN 10 04/10/2025    CREATININE 0.82 04/10/2025     Lab Results   Component Value Date    INR 1.11 04/10/2025    INR 1.16 04/08/2025    INR 1.21 (H) 04/06/2025    PROTIME 14.7 04/10/2025    PROTIME 15.2 (H) 04/08/2025    PROTIME 15.7 (H) 04/06/2025     Lab Results   Component Value Date    PTT 30 04/10/2025       Lab Results   Component Value Date    GLUCOSE 168 (H) 04/07/2024       Lab Results   Component Value Date    HGBA1C 5.9 (H) 03/28/2025       Type and Screen:  B      Physical Exam    Airway    Mallampati score: II  TM Distance: >3 FB  Neck ROM: full     Dental         Cardiovascular      Pulmonary      Other Findings  post-pubertal.      Anesthesia Plan  ASA Score- 3     Anesthesia Type- general with ASA Monitors.         Additional Monitors: arterial line.    Airway Plan: ETT.           Plan Factors-Exercise tolerance (METS): >4 METS.    Chart reviewed.   Existing labs reviewed. Patient summary reviewed.                  Induction- intravenous.    Postoperative Plan- Plan for postoperative opioid use. Planned trial extubation        Informed Consent- Anesthetic plan and risks discussed with patient.  I personally reviewed this patient with the CRNA. Discussed and agreed on the Anesthesia Plan with the CRNA..      NPO Status:  No vitals data found for the desired time range.

## 2025-05-01 NOTE — ASSESSMENT & PLAN NOTE
Patient presented with back pain status post bilateral navigated revision L3-S1 laminectomy/decompression, revision L3-S1 posterior instrumented spinal fusion with TLIF on 5/1/2025  Management per orthopedic surgery

## 2025-05-01 NOTE — ASSESSMENT & PLAN NOTE
Wt Readings from Last 3 Encounters:   05/01/25 94.3 kg (208 lb)   04/24/25 94.3 kg (208 lb)   04/23/25 94.8 kg (209 lb)     Appears well compensated  Monitor volume status postoperatively  Continue GDMT as per home regimen  Only uses Lasix as needed

## 2025-05-01 NOTE — OP NOTE
OPERATIVE REPORT  PATIENT NAME: Vesna Langston    :  1960  MRN: [id number]  Pt Location: [facility] OR ROOM 18    SURGERY DATE: 2025    Surgeons and Role:     * Charles Mcrae MD - Primary     * Monserrat Beltran PA-C - Assisting     * Julius Mai MD - Assisting    Preop Diagnosis:  History of lumbar spinal fusion [Z98.1]  Radiculopathy, lumbar region [M54.16]    Post-Op Diagnosis Codes:     * History of lumbar spinal fusion [Z98.1]     * Radiculopathy, lumbar region [M54.16]    Procedure(s):  Exploration of fusion mass, L3-S1: 42726  Removal of hardware, L3-S1:   Arthrodesis, L5-S1 TLIF:   Laminotomy/facetectomy, L5-S1: 88891   Arthrodesis, L4-5 PL: 84975   Arthrodesis, L3-4 PL: 59628   Posterior segmental instrumentation/pedicle screw fixation,L3-S1: 17869  3D computer-assisted navigation: 73406      Specimen(s):  * No specimens in log *    Estimated Blood Loss:   Minimal    Drains:  Closed/Suction Drain Left Back Bulb 19 Fr. (Active)   Site Description Unable to view 25   Dressing Status Clean;Dry;Intact 25   Drainage Appearance Serosanguineous 25 1653   Status To bulb suction 25 1653   Number of days: 0       Urethral Catheter Latex 16 Fr. (Active)   Site Assessment Clean;Skin intact 25 1653   Collection Container Standard drainage bag 25 1653   Securement Method Securing device (Describe) 25 1653   Number of days: 0       Anesthesia Type:   General    Operative Indications:  History of lumbar spinal fusion [Z98.1]  Radiculopathy, lumbar region [M54.16]    65-year-old female with low back, ambulatory dysfunction.  History of previous lumbar surgery, with L3-S1 pseudoarthrosis, lower extremity weakness.  After failing conservative management, the risks, benefits and alternatives to surgery were discussed and patient elected to proceed with surgical intervention.     Operative Findings:  Pseudoarthrosis with loose L3-S1 screws      Complications:   None    Procedure and Technique:  Patient was identified in the preoperative holding area.  The operative site was appropriately marked.      Patient was taken to the operating room.  Antibiotics were administered.  Sequential compression devices were applied.  After completion anesthesia, neuro monitoring leads were applied and alaniz was inserted.  Patient was placed prone on the Mono table.  During this time and the entire operation, care was taken to maintain appropriate perfusion pressures.  All bony prominences were properly padded.  Preprocedure fluoroscopic images were obtained in AP and lateral position.  The operative field was then prepped and draped in the normal sterile fashion.  Surgical loupes and headlamp were used during the procedure.      Incision was made in the skin over the intended surgical levels, with in the previous surgical scar and dissection was carried down through the subcutaneous tissue down to level of deep fascia.   Hemostasis was obtained using Bovie cautery.  Extensive scar tissue was encountered.  The L2 spinous process was identified.  We then continued our dissection lateral and identified the L3-S1 pedicle screws.       At this point we turned our attention to the existing fusion hardware.  We first utilized neuromonitoring and evaluate the screws.  All screws stimulated to within appropriate thresholds.  The bilateral L3 and S1 screws were excessively loose and removed.  The left-sided L4 and L5 screws were intact however the left L5 screw had excessive toggle upon removal.  The right sided L4 screw was misplaced and also easily removed.  The right sided L5 screw was intact.  We continued the dissection lateral in the posterior lateral gutter.  There was unincorporated bone graft material bilaterally which was removed with a rongeur and then thoroughly irrigated.  The pedicle tract for the L3-S1 screws (except the right L4) was probed with ball-tip  probe - bone was encountered at the base of the tract as well as along all 4 walls of the pedicle including the bilateral L5 screws (there was concern that there was anterior cortex violation on preop CT scan).       Attention was then directed to the placement of implants according to the presurgical plan. Stereotactic computer-assisted navigation (which additionally included but was not limited to intraoperative placement of fiducial markers and image-assisted registration) was utilized to increase precision during placement of instrumentation during surgery while avoiding vital structures.   O-Arm CT image acquisition was completed.  We then turned our attention to the right L4 pedicle screw.  First using a navigated bur for a starting hole, followed by navigated drill, the pedicle was drilled.  A ball-tip probe was used to palpate the pedicle and bone was encountered at the base of the tract as well as along all 4 walls.  The pedicle was tapped using a navigated tap.  7.5x45mm pedicle screws was then placed under navigated guidance.  Neurophysiology monitoring was stable. The L3-S1 screws bilateral were then replaced utilizing the previous pedicle screw tract and upsized.  L3 screws were upsized to 9.5 millimeters screws bilateral, left L4 screw was upsized to 7.5 mm, bilateral L5 screws were upsized to 7.5 mm, and S1 screws were upsized to 9.5 mm bilateral.  Fluoroscopy was used to confirm appropriate placement of pedical screws.   All screws stimulated to within appropriate thresholds.     We turned our attention to the L5-S1 decompression and TLIF stabilization.  There was extensive scar in the laminectomy bed.  Careful dissection was carried down to identify the bony landmarks.  A small curved currette was then used to undermine the ligamentum flavum/scar tissue at the superior border of the S1 lamina/superior facet.  All dural adhesions were freed from the surrounding bone and ligaments with the use of a  Penfield 3 and Mike prior to the use of a Kerrison punch.  The decompression was continued into the left subarticular zone laterally.   A left sided lateral recess decompression and medial facetectomy was performed with 3-0 and 4-0 Kerrison punch.   An osteotome was then used to create an osteotomy through the left sided inferior laminar edge of the cephalad vertebrae exiting at the pars interarticularis at the same level, taking care not to osteotomize the pedicle above.  This allowed removal of the inferior articular process of the cephalad vertebrae.  The superior articular process of the inferior vertebrae was then removed with a large rongeur and the respective pedicle was skeletonized using a 3 and 4 Kerrison punch.  After hemostasis which was achieved with a bipolar cautery, the exiting and traversing nerve root of the motion segment were then well exposed.  Wide exposure of the transforaminal area was now obtained and sharp annulotomy and radical discectomy of the disc base was performed using scalpel, pituitaries, straight and angled curettes.  Distraction was carried out intermittently with use of bullet dilators placed into the interspace following the discectomy and pedicle screw distraction.  After a thorough discectomy was obtained from the ipsilateral to contralateral side, the endplates were feathered with angled osteotomes.  The interspace was then sized for an intervertebral prosthetic cage device.  The interspace was trialed to the appropriate size implant which was then placed after being filled with local autograft bone as well as allograft, and impacted into the interspace carefully protecting both the traversing and exiting nerve roots.  Bone graft was placed in front of the implant prior to placement.  Neurophysiology monitoring was stable.  The device had an excellent fit within the interspace and was found to be in acceptable position on spine imaging. An 8mm Medtronic Capstone was used.    Neurophysiology monitoring was stable.  A Mike was then passed around the exiting nerve to confirm that it was free without any compression.  In addition, a Penfield 4 was used to confirm that the exiting root could be freely mobilized from the medial border of the pedicles.       A valsalva was performed.  No dural rents, leaks were attenuations were noted.  No residual sharp bony edges remained.  The wound was extensively irrigated.  Hemostatic agents were applied and there was no active bleeding note.    The right L3-S1 and left L3-L5 transverse processes, lateral pars and the lateral wall the facet joint were decorticated with the use of a high-speed cortez.  Local autograft, demineralized bone matrix and I-factor were mixed.  The bone graft was then packed in the inner transverse space bilaterally. In addition BMP was used for the right sided posterior lateral fusion.        Fluoroscopy was used to confirm appropriate placement of the screws.   Appropriate sized lordotic rods were inserted in the tulip heads and secured.  The set screws were final tightened.  Final fluoroscopic images were obtained.  Orthogonal views confirmed appropriate placement of instrumentation.       Vancomycin powder was applied to the wound.  19Fr Alec deep drain was placed in the wound bed.  Wound was closed in a layered fashion including muscle and fascia with 1 Vicryl followed by 2-0 Vicryl and staples for skin.  The drain was secured.  Mepilex dressing was applied to the wound.  The sponge and needle count were reported as correct at the end of the case.       The patient was gently flipped supine, emerged from anesthesia, and extubated.  Patient was transferred to the recovery room in stable condition.       I was present for the entire procedure    Patient Disposition:  PACU        SIGNATURE: Charles Mcrae MD  DATE: May 1, 2025  TIME: 5:51 PM

## 2025-05-01 NOTE — CONSULTS
Consultation - Hospitalist   Name: Vesna Langston 65 y.o. female I MRN: [id number]  Unit/Bed#: -01 I Date of Admission: 5/1/2025   Date of Service: 5/1/2025 I Hospital Day: 0   Inpatient consult to Internal Medicine  Consult performed by: Mamie Abel DO  Consult ordered by: Monserrat Beltran PA-C        Physician Requesting Evaluation: Charles Mcrae MD   Reason for Evaluation / Principal Problem: S/p lumbar fusion surgery    Assessment & Plan  Status post lumbar spinal fusion  Patient presented with back pain status post bilateral navigated revision L3-S1 laminectomy/decompression, revision L3-S1 posterior instrumented spinal fusion with TLIF on 5/1/2025  Management per orthopedic surgery  Coronary artery disease involving native coronary artery of native heart  S/p stent in 3/2024  Continue Toprol-XL  Plavix currently held, consider resuming tomorrow after discussion with orthopedic surgery  Atrial flutter, paroxysmal (HCC)  Continue Toprol and resume Eliquis once cleared by surgery service  HFrEF (heart failure with reduced ejection fraction) (HCC)  Wt Readings from Last 3 Encounters:   05/01/25 94.3 kg (208 lb)   04/24/25 94.3 kg (208 lb)   04/23/25 94.8 kg (209 lb)     Appears well compensated  Monitor volume status postoperatively  Continue GDMT as per home regimen  Only uses Lasix as needed  Acquired hypothyroidism  Continue levothyroxine  Class 1 obesity due to excess calories with serious comorbidity and body mass index (BMI) of 34.0 to 34.9 in adult  Encourage lifestyle changes  Other pulmonary embolism without acute cor pulmonale, unspecified chronicity (HCC)  Resume Eliquis once cleared by surgery service    I have discussed the above management plan in detail with the primary service.   Hospitalist service will follow.      VTE Pharmacologic Prophylaxis:   Moderate Risk (Score 3-4) - Pharmacological DVT Prophylaxis Ordered: heparin.  Code Status: Prior   Discussion with family:  Updated  (daughter) at bedside.      History of Present Illness   Chief Complaint: S/p back surgery    Vesna Langston is a 65 y.o. female with a PMH of lumbar radiculopathy, PE, CAD, HFrEF, hypothyroidism, atrial flutter, s/p ICD/PPM who presents for elective lumbar spinal fusion with orthopedic surgery.  Patient is now status post bilateral navigated revision L3-S1 laminectomy/decompression, a revision L3-S1 posterior instrumented spinal fusion with TLIF on 5/1/2025.  Medicine has been consulted for medical management.  Currently patient denies any chest pain, shortness of breath, lightheadedness, dizziness, nausea, vomiting, abdominal pain.  Reports overall feeling well.  Pain is largely controlled however it is starting to get slightly worse for which she received oxycodone.    Review of Systems   Musculoskeletal:  Positive for back pain.   All other systems reviewed and are negative.      Historical Information   Past Medical History:   Diagnosis Date    Acute respiratory insufficiency 03/22/2024    Allergic     Anxiety 4/24    Arrhythmia 3/22/24    Atrial fibrillation (HCC) 3/22/24    Back pain 2022    Bradycardia 3/22/24    Breast cyst 2000    Chronic HFrEF (heart failure with reduced ejection fraction) ([facility])     Clotting disorder (HCC) 4/1/24    Coronary artery disease     COVID-19 virus infection 11/28/2022    Depression 4/24    Diabetes mellitus ([facility])     Disease of thyroid gland 4/09/2024    Environmental and seasonal allergies 1/1/1960    GERD (gastroesophageal reflux disease) 10/09    GI (gastrointestinal bleed) 4/1/24    Heart disease 3/24    History of placement of internal cardiac defibrillator 3/27/24    Hyperlipidemia     Hypoglycemia     ICD (implantable cardioverter-defibrillator) in place     Ischemic cardiomyopathy     Lactic acidosis 03/22/2024    Migraine 1980    Morning headache 1980    Myocardial infarction (HCC) 3/22/2024    Nausea & vomiting 04/03/2025    Otitis media 1966     Pacemaker 3/27/24    Peptic ulceration 2024    Seasonal allergies     Sinus problem 1970    ST elevation myocardial infarction involving left anterior descending (LAD) coronary artery ([facility]) 2024    Tobacco abuse     Visual impairment 1967     Past Surgical History:   Procedure Laterality Date    ADENOIDECTOMY      APPENDECTOMY      APPENDECTOMY LAPAROSCOPIC N/A 2024    Procedure: APPENDECTOMY LAPAROSCOPIC;  Surgeon: Lauryn Ullrich, DO;  Location: [facility];  Service: General    BACK SURGERY      BREAST EXCISIONAL BIOPSY Right 2000    cyst removed- benign    CARDIAC CATHETERIZATION N/A 2024    Procedure: Cardiac pci;  Surgeon: Gabriel Myers MD;  Location: [facility];  Service: Cardiology    CARDIAC CATHETERIZATION N/A 2024    Procedure: Cardiac Coronary Angiogram;  Surgeon: Gabriel Myers MD;  Location: [facility];  Service: Cardiology    CARDIAC CATHETERIZATION N/A 2024    Procedure: Cardiac PCI AMI;  Surgeon: Gabriel Myers MD;  [facility]: [facility];  Service: Cardiology    CARDIAC CATHETERIZATION N/A 2024    Procedure: Cardiac IVUS;  Surgeon: Gabriel Myers MD;  [facility];  Service: Cardiology    CARDIAC DEFIBRILLATOR PLACEMENT      CARDIAC ELECTROPHYSIOLOGY PROCEDURE N/A 2024    Procedure: Cardiac icd implant;  Surgeon: Jeffy Lama MD;  Location: [facility];  Service: Cardiology     SECTION      COLONOSCOPY      ECTOPIC PREGNANCY SURGERY      INSERT / REPLACE / REMOVE PACEMAKER      MASS EXCISION Left 10/18/2024    Procedure: EXCISION BIOPSY TISSUE LESION/MASS UPPER EXTREMITY - Left index finger;  Surgeon: Leandro Campos MD;  Location:  MAIN OR;  Service: Orthopedics    SEPTOPLASTY      SPINE SURGERY  23    TONSILLECTOMY      TONSILLECTOMY AND ADENOIDECTOMY  1964    TRIGGER POINT INJECTION      UPPER GASTROINTESTINAL ENDOSCOPY  24     Social History     Tobacco Use    Smoking status: Former      Current packs/day: 0.00     Average packs/day: 1 pack/day for 24.2 years (24.2 ttl pk-yrs)     Types: Cigarettes     Start date: 2000     Quit date: 3/22/2024     Years since quittin.1     Passive exposure: Never    Smokeless tobacco: Never    Tobacco comments:     Smoked 0.5-1 ppd; quit in 2024.    Vaping Use    Vaping status: Never Used   Substance and Sexual Activity    Alcohol use: Not Currently     Alcohol/week: 1.0 standard drink of alcohol     Types: 1 Shots of liquor per week     Comment: Every 2or 3months    Drug use: Never    Sexual activity: Not Currently     Partners: Male     Birth control/protection: Post-menopausal     E-Cigarette/Vaping    E-Cigarette Use Never User      E-Cigarette/Vaping Substances    Nicotine No     THC No     CBD No     Flavoring No     Other No     Unknown No      Family history non-contributory  Social History:  Marital Status:        Meds/Allergies   I have reviewed home medications with patient personally.  Prior to Admission medications    Medication Sig Start Date End Date Taking? Authorizing Provider   apixaban (ELIQUIS) 5 mg Take 1 tablet (5 mg total) by mouth 2 (two) times a day 25  Yes Melissa Montoya DO   atorvastatin (LIPITOR) 80 mg tablet TAKE 1 TABLET BY MOUTH EVERY DAY IN THE EVENING 1/15/25  Yes Melissa Montoya DO   clopidogrel (PLAVIX) 75 mg tablet Take 1 tablet (75 mg total) by mouth daily 24  Yes Melissa Montoya DO   diphenhydrAMINE (BENADRYL) 25 mg capsule Take 25 mg by mouth every 6 (six) hours as needed for itching   Yes Historical Provider, MD   dofetilide (TIKOSYN) 125 mcg capsule Take 1 capsule (125 mcg total) by mouth every 12 (twelve) hours 25  Yes Lance Ye PA-C   Empagliflozin (Jardiance) 25 MG TABS Take 1 tablet (25 mg total) by mouth every morning 1/3/25 6/27/26 Yes Vitor Bell MD   ergocalciferol (VITAMIN D2) 50,000 units Take 1 capsule (50,000 Units total) by mouth once a week 1/15/25  Yes  Melissa Montoya,    fluticasone (FLONASE) 50 mcg/act nasal spray 1 spray into each nostril daily 11/20/24  Yes MAHI Price   furosemide (LASIX) 20 mg tablet TAKE 2 TABLETS (40 MG TOTAL) BY MOUTH 2 (TWO) TIMES A DAY FOR 5 DAYS. THEN 2 TABLETS (40 MG TOTAL) IN THE AM & THEN 1 TABLET- 20 MG IN THE PM. 3/28/25  Yes Vitor Bell MD   furosemide (LASIX) 40 mg tablet Take 1 tablet (40 mg total) by mouth if needed (if worse shortness of breath or weight up 3lbs) 4/2/25 9/24/26 Yes Vitor Bell MD   isosorbide mononitrate (IMDUR) 30 mg 24 hr tablet TAKE 1 TABLET (30 MG TOTAL) BY MOUTH 2 (TWO) TIMES A DAY 30 MG IN THE AM AND 30 MG IN THE PM 2/12/25 8/6/26 Yes Vitor Bell MD   levothyroxine (Synthroid) 75 mcg tablet Take 1 tablet (75 mcg total) by mouth daily 8/15/24  Yes Brandon Pete MD   meclizine (ANTIVERT) 25 mg tablet Take 1 tablet (25 mg total) by mouth every 8 (eight) hours as needed for dizziness 4/5/25  Yes MAHI Gonsales   metoprolol succinate (TOPROL-XL) 25 mg 24 hr tablet Take 2 tablets (50 mg total) by mouth daily at bedtime 5/30/24 11/21/25 Yes Vitor Bell MD   Multiple Vitamin (multivitamin) tablet Take 1 tablet by mouth daily   Yes Eleni Will MD   pantoprazole (PROTONIX) 40 mg tablet TAKE 1 TABLET BY MOUTH 2 TIMES A DAY BEFORE MEALS. 12/12/24  Yes Melissa Montoya DO   potassium chloride (Klor-Con M20) 20 mEq tablet Take 1 tablet (20 mEq total) by mouth if needed (on lasix days) 2/14/25 8/8/26 Yes Vitor Bell MD   pregabalin (LYRICA) 75 mg capsule Take 1 capsule (75 mg total) by mouth 3 (three) times a day 4/10/25  Yes Maxime Cameron MD   sacubitril-valsartan (Entresto) 49-51 MG TABS Take 1 tablet by mouth 2 (two) times a day 1/28/25 7/27/25 Yes Vitor Bell MD   semaglutide, 1 mg/dose, (Ozempic) 4 mg/3 mL injection pen Inject 0.75 mL (1 mg total) under the skin once a week 4/9/25  Yes Melissa Montoya,    albuterol (ProAir HFA) 90 mcg/act inhaler Inhale 2 puffs every  6 (six) hours as needed for wheezing 5/1/24   Brandon Pete MD   HYDROcodone-acetaminophen (NORCO) 5-325 mg per tablet Take 1 tablet by mouth every 6 (six) hours as needed for pain    Historical Provider, MD   nitroglycerin (NITROSTAT) 0.4 mg SL tablet PLACE 1 TABLET UNDER THE TONGUE EVERY 5 MINUTES AS NEEDED FOR CHEST PAIN.  Patient not taking: No sig reported 3/12/25   Vitor Bell MD     Allergies   Allergen Reactions    Fish-Derived Products - Food Allergy Anaphylaxis     Cannot have all seafood products    Shellfish-Derived Products - Food Allergy Anaphylaxis    Azithromycin Hives and Rash       Objective :  Temp:  [96.8 °F (36 °C)-97.7 °F (36.5 °C)] 97.3 °F (36.3 °C)  HR:  [73-84] 84  BP: (107-135)/(60-84) 127/69  Resp:  [13-27] 16  SpO2:  [95 %-100 %] 95 %  O2 Device: Nasal cannula  Nasal Cannula O2 Flow Rate (L/min):  [3 L/min] 3 L/min    Physical Exam  Vitals reviewed.   Constitutional:       General: She is not in acute distress.     Appearance: Normal appearance. She is not ill-appearing.   HENT:      Head: Normocephalic and atraumatic.   Cardiovascular:      Rate and Rhythm: Normal rate and regular rhythm.      Heart sounds: Normal heart sounds.   Pulmonary:      Effort: Pulmonary effort is normal. No respiratory distress.   Abdominal:      General: Bowel sounds are normal.      Palpations: Abdomen is soft.      Tenderness: There is no abdominal tenderness.   Musculoskeletal:      Right lower leg: No edema.      Left lower leg: No edema.   Neurological:      Mental Status: She is alert and oriented to person, place, and time.          Lines/Drains:  Lines/Drains/Airways       Active Status       Name Placement date Placement time Site Days    Closed/Suction Drain Left Back Bulb 19 Fr. 05/01/25  1615  Back  less than 1    Urethral Catheter Latex 16 Fr. 05/01/25  1052  Latex  less than 1                  Urinary Catheter:  Goal for removal: Plan to remove POD#1               Lab Results: I have  reviewed the following results:              Results from last 7 days   Lab Units 05/01/25  1703 05/01/25  1358 05/01/25  1006   POC GLUCOSE mg/dl 170* 195* 132     Lab Results   Component Value Date    HGBA1C 5.9 (H) 03/28/2025    HGBA1C 7.1 (H) 12/27/2024    HGBA1C 7.2 (H) 09/27/2024           Imaging Results Review: No pertinent imaging studies reviewed.  Other Study Results Review: No additional pertinent studies reviewed.    Administrative Statements   I have spent a total time of 60 minutes in caring for this patient on the day of the visit/encounter including Impressions, Counseling / Coordination of care, Documenting in the medical record, Reviewing/placing orders in the medical record (including tests, medications, and/or procedures), Obtaining or reviewing history  , and Communicating with other healthcare professionals .    ** Please Note: This note has been constructed using a voice recognition system. **

## 2025-05-01 NOTE — ASSESSMENT & PLAN NOTE
Wt Readings from Last 3 Encounters:   05/01/25 94.3 kg (208 lb)   04/24/25 94.3 kg (208 lb)   04/23/25 94.8 kg (209 lb)

## 2025-05-01 NOTE — PLAN OF CARE
Problem: PAIN - ADULT  Goal: Verbalizes/displays adequate comfort level or baseline comfort level  Description: Interventions:- Encourage patient to monitor pain and request assistance- Assess pain using appropriate pain scale- Administer analgesics based on type and severity of pain and evaluate response- Implement non-pharmacological measures as appropriate and evaluate response- Consider cultural and social influences on pain and pain management- Notify physician/advanced practitioner if interventions unsuccessful or patient reports new pain  5/1/2025 1944 by Kae De Paz RN  Outcome: Progressing  5/1/2025 1944 by Kae De Paz RN  Outcome: Progressing     Problem: SAFETY ADULT  Goal: Patient will remain free of falls  Description: INTERVENTIONS:- Educate patient/family on patient safety including physical limitations- Instruct patient to call for assistance with activity - Consult OT/PT to assist with strengthening/mobility - Keep Call bell within reach- Keep bed low and locked with side rails adjusted as appropriate- Keep care items and personal belongings within reach- Initiate and maintain comfort rounds- Make Fall Risk Sign visible to staff- Offer Toileting every 2 Hours, in advance of need- Initiate/Maintain bed/chair alarm- Obtain necessary fall risk management equipment: nonskid footwear- Apply yellow socks and bracelet for high fall risk patients- Consider moving patient to room near nurses station  5/1/2025 1944 by Kae De Paz RN  Outcome: Progressing  5/1/2025 1944 by Kae De Paz RN  Outcome: Progressing  Goal: Maintain or return to baseline ADL function  Description: INTERVENTIONS:-  Assess patient's ability to carry out ADLs; assess patient's baseline for ADL function and identify physical deficits which impact ability to perform ADLs (bathing, care of mouth/teeth, toileting, grooming, dressing, etc.)- Assess/evaluate cause of self-care deficits - Assess range of motion-  Assess patient's mobility; develop plan if impaired- Assess patient's need for assistive devices and provide as appropriate- Encourage maximum independence but intervene and supervise when necessary- Involve family in performance of ADLs- Assess for home care needs following discharge - Consider OT consult to assist with ADL evaluation and planning for discharge- Provide patient education as appropriate  5/1/2025 1944 by Kae De Paz RN  Outcome: Progressing  5/1/2025 1944 by Kae De Paz RN  Outcome: Progressing  Goal: Maintains/Returns to pre admission functional level  Description: INTERVENTIONS:- Perform AM-PAC 6 Click Basic Mobility/ Daily Activity assessment daily.- Set and communicate daily mobility goal to care team and patient/family/caregiver. - Collaborate with rehabilitation services on mobility goals if consulted- Perform Range of Motion 3 times a day.- Reposition patient every 2 hours.- Dangle patient 3 times a day- Stand patient 3 times a day- Ambulate patient 3 times a day- Out of bed to chair 3 times a day - Out of bed for meals 3 times a day- Out of bed for toileting- Record patient progress and toleration of activity level   5/1/2025 1944 by Kae De Paz RN  Outcome: Progressing  5/1/2025 1944 by Kae De Paz RN  Outcome: Progressing     Problem: DISCHARGE PLANNING  Goal: Discharge to home or other facility with appropriate resources  Description: INTERVENTIONS:- Identify barriers to discharge w/patient and caregiver- Arrange for needed discharge resources and transportation as appropriate- Identify discharge learning needs (meds, wound care, etc.)- Arrange for interpretive services to assist at discharge as needed- Refer to Case Management Department for coordinating discharge planning if the patient needs post-hospital services based on physician/advanced practitioner order or complex needs related to functional status, cognitive ability, or social support  system  5/1/2025 1944 by Kae De Paz RN  Outcome: Progressing  5/1/2025 1944 by Kae De Paz RN  Outcome: Progressing     Problem: Knowledge Deficit  Goal: Patient/family/caregiver demonstrates understanding of disease process, treatment plan, medications, and discharge instructions  Description: Complete learning assessment and assess knowledge base.Interventions:- Provide teaching at level of understanding- Provide teaching via preferred learning methods  5/1/2025 1944 by Kae De Paz RN  Outcome: Progressing  5/1/2025 1944 by Kae De Paz RN  Outcome: Progressing

## 2025-05-01 NOTE — H&P
25 H&P Update  See detailed H&P office note from 25 and 25.    H&P reviewed. After examining the patient I find no significant changes in the patients condition since the H&P had been written - continues with back pain, ambulatory dysfunction.  Imaging, clinical findings and procedure reviewed with patient and son & daughter at bedside.  Plan to proceed with revision lumbar fusion decompression surgery.       General: appears well, no acute distress  Respiratory: No SOB, no abnormal effort   Abdomen: Soft. No tenderness.    Cardiac: RRR, warm & well perfused    Assessment & Plan/Medical Decision Makin y.o. female with Back Pain, Left Radicular Leg Pain, and Ambulatory Dysfunction and imaging findings most notable for lumbar spondylosis, L3-S1 fusion construct with evidence of pseudoarthrosis          The clinical, physical and imaging findings were reviewed with the patient.  Vesna  has a constellation of findings consistent with Lumbar Myofascial Pain, Lumbar Radiculopathy, and Ambulatory Dysfunction in the setting of lumbar degenerative disease, history of workplace injury that is s/p L3-S1 fusion with evidence of pseudoarthrosis.      We discussed the differential diagnosis including extremity/musculoskeltal pathology, peripheral nerve compression, vascular claudication etc.  Vesna symptoms, exam findings and imaging are most consistent with lumbar radiculopathy, pseudoarthrosis.  In my opinion, no further diagnostic work-up (EMG, etc) is warranted at this time.  Discussed treatment options.  Reviewed the role of further non operative treatment such as physical therapy, activity modification, medication management and interventional spine procedures.  Given that her symptoms have persisted despite conservative interventions, recommend consideration of surgical intervention.   Vesna Langston would like to proceed with lumbar surgery.       We discussed surgical options.  In my opinion, would be  exploration of fusion mass, revision posterior lumbar fusion with instrumentation L3-S1, with transforaminal lumbar interbody fusion to address mechanical back pain, pseudoarthrosis.   We reviewed the options - such as instrumented vs non-instrumented, posterolateral vs interbody, local autograft vs ICBG vs allograft +/- biologic/graft extender/graft substitute supplementation for obtaining solid arthrodesis as well as the associated risk/benefit profiles and fusion efficacy as well as the FDA status of the instrumentation and products.   After discussion with the patient will plan on posterolateral instrumented + TLIF with use of local autograft and allograft with graft extender/substitute supplementation, possible BMP.  We did discuss possibly not removing right L2 screw.  Explained at length the rationale for surgical invention and postoperative expectations.  We discussed and Vesna expressed her understanding, that in general, spine surgery is more predictive in improving extremity/radicular discomfort rather than axial spine pain, and arresting the progression of spinal cord/nerve dysfunction rather than improving.  Will utilize LSO brace immobilization during post operative recovery to help reduce pain by restricting mobility of the trunk and facilitate healing.      I reviewed with the patient possible risks of surgery which included the risk of infection, blood loss, risk of damage to adjacent nerves, vessels organs, risk of spinal fluid leak and meningitis, risk of chronic pain, incomplete resolution of symptoms, instability, adjacent segment degeneration & disease, risk of nonunion and instrumentation failure, disease transmission, need for further surgery, DVT, pulmonary embolism, blindness, paralysis and even death, as well as other risk on consent form.   We also discussed patient specific risks and mitigation including revision type surgery which increases risks of infection, blood loss, CSF leak, and  associated suzanna- and post- operative related risks.   We also discussed the risk associated with her metabolic work up (vitamin d, etc).      We reviewed infection prevention.  Reviewed medications; instructed patient medications to stop before surgery (i.e. blood thinners, NSAIDs, DMARDs and vitamins)     Also reviewed with patient our narcotic policy.  We will provide medications up to 60 days postop.  In the event patient requires more medications, will need to consult their PCP and/or see a pain management physician.     I have queried the [place] database for Vesna Langston to better assist in clinical decision making regarding prescriptions for controlled medications.  After querying the database and considering the clinical picture I have determined that she is a candidate for a prescription for control medication in the initial postoperative period.     All questions were answered.  Patient verbalized understanding.  Informed consent was obtained. Consent form was signed.  Patient had no further questions.  Patient's son was also present who had no further questions.     She received a pre-operative chest Xray at today's visit. Orders for preoperative labs (CBC, CMP, PTT, INR, type & screen, and ECG) were also placed at today's visit. Discussed pre-operative clearances, medical optimization and risk stratification.  Vesna needs pre-operative clearance from PCP and obtain PATs.      Patient instructed to return to office/ER sooner if symptoms are not improving, getting worse, or new worrisome/neurologic symptoms arise.     Subjective:      Chief Complaint: Back Pain    HPI:  Vesna Langston is a 65 y.o. female presenting for initial visit with chief complaint of low back pain - referred by Dr. Pete. This is a second opinion requested by workers comp.  Pain began 2 years ago after an injury at work. She received a fusion of L3-S1 in 2023.  2 weeks after she return to work she suffered a  twisting injury which caused her to return to her surgeon.  X-ray revealed loosening of multiple screws in her lumbar spine.  She was offered an SI injection to help with low back pain. 5 hours after the injection she suffered a heart attack in which a she received an ICD and stent. Today, she claims that she cannot feel her left leg. She attempts to walk on a treadmill for her cardiac rehab in which her left leg will give out 10-15 times during her hour long rehab. She reports that she is currently in physical therapy that is providing no relief.  Describes pain as crushing in nature.  Located in low back.  + numbness . + burning.    Denies fever or chills, no night sweats. Denies any bladder or bowel changes.      Conservative therapy includes the following:   Medications: Tylenol arthritis, gabapentin 2x per day. Norco as needed.    Injections: Yes SI joint 2 years ago     Physical Therapy: Yes  Chiropractic Medicine: has not attempted  Accupunture/Massage Therapy: has not attempted   These therapeutic modalities were ineffective at providing sustained pain relief/functional improvement.     Nicotine dependent: no  Occupation: disabled  Living situation: Lives with family   ADLs: patient is unable to perform ADL's such as shopping, cleaning, driving, etc     2/9/25 Update  Patient is here for follow up.  Obtained updated Lumbar MRI in the interim.  Vesna is here today with her son.  Continues with back pain, leg pain/fatigue.  Reports her left leg still feels weak.  She is interested in pursuing revision surgery.      Objective:     Family History   Adopted: Yes   Problem Relation Age of Onset    Depression Mother     Sleep apnea Mother     Heart disease Father     Snoring Father     Restless legs syndrome Father     OCD Daughter        Past Medical History:   Diagnosis Date    Acute respiratory insufficiency 03/22/2024    Allergic     Anxiety 4/24    Arrhythmia 3/22/24    Atrial fibrillation (HCC) 3/22/24     Back pain 2022    Bradycardia 3/22/24    Breast cyst 2000    Chronic HFrEF (heart failure with reduced ejection fraction) ([facility]ly [facility])     Clotting disorder ([facility]) 4/1/24    Coronary artery disease     COVID-19 virus infection 11/28/2022    Depression 4/24    Diabetes mellitus ([facility])     Disease of thyroid gland 4/09/2024    Environmental and seasonal allergies 1/1/1960    GERD (gastroesophageal reflux disease) 10/09    GI (gastrointestinal bleed) 4/1/24    Heart disease 3/24    History of placement of internal cardiac defibrillator 3/27/24    Hyperlipidemia     Hypoglycemia     ICD (implantable cardioverter-defibrillator) in place     Ischemic cardiomyopathy     Lactic acidosis 03/22/2024    Migraine 1980    Morning headache 1980    Myocardial infarction ([facility]) 3/22/2024    Nausea & vomiting 04/03/2025    Otitis media 1966    Pacemaker 3/27/24    Peptic ulceration 4/2024    Seasonal allergies     Sinus problem 1970    ST elevation myocardial infarction involving left anterior descending (LAD) coronary artery ([facility]) 03/22/2024    Tobacco abuse     Visual impairment 1967       Current Facility-Administered Medications   Medication Dose Route Frequency Provider Last Rate Last Admin    ceFAZolin (ANCEF) IVPB (premix in dextrose) 2,000 mg 50 mL  2,000 mg Intravenous Once Monserrat Beltran PA-C        chlorhexidine (PERIDEX) 0.12 % oral rinse 15 mL  15 mL Swish & Spit Once Monserrat Beltran PA-C        tranexamic acid (CYKLOKAPRON) 1000-0.7 MG/100ML-% injection 1,000 mg  1,000 mg Intravenous Once Monserrat Beltran PA-C           Past Surgical History:   Procedure Laterality Date    ADENOIDECTOMY      APPENDECTOMY      APPENDECTOMY LAPAROSCOPIC N/A 05/08/2024    Procedure: APPENDECTOMY LAPAROSCOPIC;  Surgeon: Lauryn Ullrich, DO;  Location: [facility];  Service: General    BACK SURGERY  8/23    BREAST EXCISIONAL BIOPSY Right 09/2000    cyst removed- benign    CARDIAC CATHETERIZATION N/A 03/22/2024    Procedure:  Cardiac pci;  Surgeon: Gabriel Myers MD;  [facility];  Service: Cardiology    CARDIAC CATHETERIZATION N/A 2024    Procedure: Cardiac Coronary Angiogram;  Surgeon: Gabriel Myers MD;  [facility];  Service: Cardiology    CARDIAC CATHETERIZATION N/A 2024    Procedure: Cardiac PCI AMI;  Surgeon: Gabriel Myers MD;  [facility];  Service: Cardiology    CARDIAC CATHETERIZATION N/A 2024    Procedure: Cardiac IVUS;  Surgeon: Gabriel Myers MD;  [facility];  Service: Cardiology    CARDIAC DEFIBRILLATOR PLACEMENT      CARDIAC ELECTROPHYSIOLOGY PROCEDURE N/A 2024    Procedure: Cardiac icd implant;  Surgeon: Jeffy Lama MD;  [facility];  Service: Cardiology     SECTION      COLONOSCOPY      ECTOPIC PREGNANCY SURGERY      INSERT / REPLACE / REMOVE PACEMAKER      MASS EXCISION Left 10/18/2024    Procedure: EXCISION BIOPSY TISSUE LESION/MASS UPPER EXTREMITY - Left index finger;  Surgeon: Leandro Campos MD;  Location:  MAIN OR;  Service: Orthopedics    SEPTOPLASTY      SPINE SURGERY  23    TONSILLECTOMY      TONSILLECTOMY AND ADENOIDECTOMY  1964    TRIGGER POINT INJECTION      UPPER GASTROINTESTINAL ENDOSCOPY  24       Social History     Socioeconomic History    Marital status:      Spouse name: Not on file    Number of children: 3    Years of education: Not on file    Highest education level: Not on file   Occupational History    Not on file   Tobacco Use    Smoking status: Former     Current packs/day: 0.00     Average packs/day: 1 pack/day for 24.2 years (24.2 ttl pk-yrs)     Types: Cigarettes     Start date: 2000     Quit date: 3/22/2024     Years since quittin.1     Passive exposure: Never    Smokeless tobacco: Never    Tobacco comments:     Smoked 0.5-1 ppd; quit in 2024.    Vaping Use    Vaping status: Never Used   Substance and Sexual Activity    Alcohol use: Not Currently      Alcohol/week: 1.0 standard drink of alcohol     Types: 1 Shots of liquor per week     Comment: Every 2or 3months    Drug use: Never    Sexual activity: Not Currently     Partners: Male     Birth control/protection: Post-menopausal   Other Topics Concern    Not on file   Social History Narrative    Not on file     Social Drivers of Health     Financial Resource Strain: Low Risk  (2/20/2023)    Received from [facility], [facility]    Overall Financial Resource Strain (CARDIA)     Difficulty of Paying Living Expenses: Not hard at all   Food Insecurity: No Food Insecurity (11/25/2024)    Nursing - Inadequate Food Risk Classification     Worried About Running Out of Food in the Last Year: Never true     Ran Out of Food in the Last Year: Never true     Ran Out of Food in the Last Year: Never true   Transportation Needs: No Transportation Needs (11/25/2024)    Nursing - Transportation Risk Classification     Lack of Transportation: Not on file     Lack of Transportation: No   Physical Activity: Not on file   Stress: No Stress Concern Present (2/20/2023)    Received from [facility], [facility]    [employer] [place] [employer] - Occupational Stress Questionnaire     Feeling of Stress : Not at all   Social Connections: Unknown (6/18/2024)    Received from [employer] [facility]    Social [employer]mate     How often do you feel lonely or isolated from those around you? (Adult - for ages 18 years and over): Not on file   Intimate Partner Violence: Unknown (11/25/2024)    Nursing IPS     Feels Physically and Emotionally Safe: Not on file     Physically Hurt by Someone: Not on file     Humiliated or Emotionally Abused by Someone: Not on file     Physically Hurt by Someone: No     Hurt or Threatened by Someone: No   Housing Stability: Unknown (11/25/2024)    Nursing: Inadequate Housing Risk Classification     Has Housing: Not on file     Worried About Losing  "Housing: Not on file     Unable to Get Utilities: Not on file     Unable to Pay for Housing in the Last Year: No     Has Housin   Recent Concern: Housing Stability - High Risk (2024)    Housing Stability Vital Sign     Unable to Pay for Housing in the Last Year: No     Number of Times Moved in the Last Year: 2     Homeless in the Last Year: No       Allergies   Allergen Reactions    Fish-Derived Products - Food Allergy Anaphylaxis     Cannot have all seafood products    Shellfish-Derived Products - Food Allergy Anaphylaxis    Azithromycin Hives and Rash       Review of Systems  General- denies fever/chills  HEENT- denies hearing loss or sore throat  Eyes- denies eye pain or visual disturbances, denies red eyes  Respiratory- denies cough or SOB  Cardio- denies chest pain or palpitations  GI- denies abdominal pain  Endocrine- denies urinary frequency  Urinary- denies pain with urination  Musculoskeletal- Negative except noted above  Skin- denies rashes or wounds  Neurological- denies dizziness or headache  Psychiatric- denies anxiety or difficulty concentrating    Physical Exam  /84 (BP Location: Left arm)   Pulse 73   Temp (!) 96.8 °F (36 °C) (Temporal)   Resp 18   Ht 5' 8\" (1.727 m)   Wt 94.3 kg (208 lb)   SpO2 98%   BMI 31.63 kg/m²     General/Constitutional: No apparent distress: well-nourished and well developed.  Lymphatic: No appreciable lymphadenopathy  Respiratory: Non-labored breathing  Vascular: No edema, swelling or tenderness, except as noted in detailed exam.  Integumentary: No impressive skin lesions present, except as noted in detailed exam.  Psych: Normal mood and affect, oriented to person, place and time.  MSK: normal other than stated in HPI and exam  Gait & balance: no evidence of myelopathic gait, ambulates with a Cane    Lumbar spine range of motion:  -Forward flexion to 45  -Extension to neutral  -Lateral bend 15 right, 15 left  -Rotation 5 right, 5 left  There is " "tenderness with palpation along lumbar paraspinal musculature, no midline tenderness , well-healed surgical sites    Neurologic:    Lower Extremity Motor Function    Right  Left    Iliopsoas  5/5  4/5    Quadriceps 5/5 4/5   Tibialis anterior  5/5  4/5    EHL  5/5  3+/5    Gastroc. muscle  5/5  2/5    Heel rise  5/5  2/5    Toe rise  5/5  2/5      Sensory: light touch is intact to bilateral upper and lower extremities     Reflexes: Intact/diminished    Other tests:  Straight Leg Raise: positive  Jazmyn SI: positive  MAGNO SI: negative  Greater troch: no tenderness   Internal/external hip ROM: intact, no pain   Flexion/extension knee ROM: intact, no pain   Vascular: WWP extremities    Diagnostic Tests   IMAGING: I have personally reviewed the images and these are my findings:  Lumbar Spine X-rays from 8/20/24: multi level lumbar spondylosis, L3-S1 bilateral pedicle screw and aliya construct with obvious loosening of the L3 screws, the right L4 screw is misplaced and not within the pedicle, L4-5 spondylolisthesis    Lumbar Spine MRI from 3/18/2024: L3-S1 fusion construct with no significant central or foraminal stenosis appreciated    CT abdomen and pelvis from 5/7/2024: L3-S1 fusion construct with gross loosening of the L3 screws with halo appearance around bilateral screws, L4 screws misplaced and not within the pedicle, the bilateral L5 screws appear to have lateral cortex violation, screw loosening of the S1 left pedicle screw with halo appearance    Lumbar Spine MRI from 2/4/25: L3-S1 fusion construct with no significant central or foraminal stenosis appreciated    Procedures, if performed today     None performed       Portions of the record may have been created with voice recognition software.  Occasional wrong word or \"sound a like\" substitutions may have occurred due to the inherent limitations of voice recognition software.  Read the chart carefully and recognize, using context, where substitutions have " occurred.

## 2025-05-01 NOTE — ANESTHESIA POSTPROCEDURE EVALUATION
Post-Op Assessment Note    CV Status:  Stable    Pain management: adequate       Mental Status:  Awake and sleepy   Hydration Status:  Euvolemic   PONV Controlled:  Controlled   Airway Patency:  Patent     Post Op Vitals Reviewed: Yes    No anethesia notable event occurred.    Staff: Anesthesiologist           Last Filed PACU Vitals:  Vitals Value Taken Time   Temp 97.4F 05/01/25 1654   Pulse 84 05/01/25 1654   /60 05/01/25 1654   Resp 13 05/01/25 1654   SpO2 100 % 05/01/25 1654   Vitals shown include unfiled device data.

## 2025-05-01 NOTE — DISCHARGE INSTR - AVS FIRST PAGE
Dr. [facility]  Post-op Information and Instructions  Lumbar Fusion    1. Follow-up  - You should return to the office for your follow-up appointment approximately 2 weeks post-op. This should have been scheduled prior to your surgery.  If you do not have an appointment scheduled, please call [phone] to do so as soon as possible.  - At the first post op appointment we will check your incision, remove any staples, and make sure that you are healing well.  - X-rays will be taken at this appointment to evaluate your hardware.  - If you need any refills on your pain medications, this will be addressed at your follow-up appointment.  - You can expect to have post-op appointments at 2 weeks, 6 weeks, 3 months, 6 months and 1 year after surgery.  After that time, we will continue to follow you yearly to monitor your fusion.    2. Incision Care:  - You will have a surgical dressing over the area. Keep dressing intact and incision dry until 2 week post-op visit. The surgical dressing is placed in a sterile environment and is important to allow the wound to begin healing in a sterile environment.  - You have staples closing your incision and also sutures that are buried beneath the skin and will dissolve with time. The staples will be removed at your follow-up visit, please do not remove them prior to your follow-up appointment. The incision may be raised initially, although this will improve as the sutures dissolve.  - Please keep surgical dressing on and incision dry until your dressing is removed at 2 week post-op visit. Avoid getting incision wet in the shower until dressing are removed at follow-up visit. After 2 weeks, your incision may get wet in the shower, but DO NOT submerge your incision (bath, hot tub, pool, lake, etc.) until you have been cleared to do so. You may gently wash it with soap and water. Do not scrub the incision. Air-dry the incision or pat dry with clean, fresh towel.  - If the  dressing falls off prior to 2 week post-op visit, please keep the incision covered with a non-adhesive dressing. Dressing material can be purchased over the counter at any drug store. Materials include: square gauze, ABD dressing, and skin tape. Place dry dressing over dry incision and tape the sides.  - Please do not apply any ointments or salves to the incision unless instructed to do so.  - If you notice any drainage, foul odor, increased redness, swelling or pain of the incision, please call our office immediately.  These may be signs of an infection and should be evaluated.    3. Medications  - You will be given a prescription for pain medication when you are discharged from the hospital.  - It is recommended that you take the medication as prescribed for the first 24-48 hours after surgery, even if your pain is minimal.  It is much easier to control your pain when you stay on top of the medications than if you wait to take them until your pain is severe.  - After the first few days, you should try to take the pain medication less often, and only when needed.  - You may take Tylenol in place of, not in addition to, your pain medication if you wish.  - DO NOT take NSAIDs (Advil, Aleve, Ibuprofen, Motrin, Naproxen, etc.) for 3 months following your surgery.  This may impede the healing of your fusion.  - DO NOT drive while you are taking narcotic pain medications.  - If there is an issue with any of your discharge medications, please call our office at [phone] to discuss.  - It is very common for surgery and narcotic pain medications to cause constipation.  It is very important that you eat a high fiber diet, drink lots of water, and also consider taking a stool softener while taking pain medications to help with this.  It is not uncommon for you to go several days without a bowel movement after surgery.  If you are constipated and it is accompanied by severe abdominal pain, nausea and vomiting, inability  to keep food or drink down, and/or a fever, please call our office as these may be signs of a more serious medical condition.    4. DVT prophylaxis:  - Resume your at home Eliquis and Plavix regimen as previously prescribed.    5. Activity:  - You should have been fitted for a lumbar brace. You may remove this for hygiene (showering, etc.), and for sleeping. The brace should otherwise be worn for the first 3 months post-operatively. If you have any issues with the fit of your brace, you can call the office and we can have you come in to see our brace fitter.  - You should avoid any bending, twisting. If you must bend over, please use your knees.  - You CANNOT lift greater than 5lbs, or a gallon of milk, until instructed.  - You should try to avoid sitting for greater than 20 minutes at a time as this will cause your muscles to stiffen and spasm, making you more uncomfortable.  - You will start PT (physical therapy) around 6 to12 weeks after surgery.  - You may resume your normal daily activities as tolerated.  - Walking is the best exercise and you should try to walk up to 1 mile throughout the day as you are recovering.  - You should NOT smoke following spinal fusion as this will impede your healing.    6. Diet:  - It is important to eat a well balanced diet to help your body heal.  - You should make sure that you are drinking plenty of fluids - water is best    7. Return to Work:  - You can expect to be out of work for at least 6 weeks up to 3 months.  We will discuss your return to work status at your post-op appointments, but please do not hesitate to call if you have any questions or concerns.  - Please make sure that any short-term disability paperwork is dropped off at the .    Please call our office if you have any of the following after surgery:  - Persistent fever greater than 100.5 degrees  - Foul odor, drainage, redness, swelling or increased pain around your incision site  - A headache that is  worse with standing or sitting, and is alleviated with lying flat on your back.  - New onset arm or leg weakness, numbness or tingling  - Significantly increased pain that is not alleviated with pain medications, rest and ice  - New-onset calf pain and/or swelling    If you experience any chest pain or shortness of breath after you are discharged, call 911 immediately.

## 2025-05-01 NOTE — ANESTHESIA PROCEDURE NOTES
Arterial Line Insertion    Performed by: Aashish Lowe CRNA  Authorized by: Genaro Talavera MD  Consent: Verbal consent obtained. Written consent obtained.  Risks and benefits: risks, benefits and alternatives were discussed  Consent given by: patient  Patient understanding: patient states understanding of the procedure being performed  Patient consent: the patient's understanding of the procedure matches consent given  Procedure consent: procedure consent matches procedure scheduled  Relevant documents: relevant documents present and verified  Required items: required blood products, implants, devices, and special equipment available  Patient identity confirmed: verbally with patient and arm band  Preparation: Patient was prepped and draped in the usual sterile fashion.  Indications: hemodynamic monitoring  Orientation:  Left  Location: radial artery  Sedation:  Patient sedated: geta.    Procedure Details:      Needle gauge: 20  Placement technique:  Ultrasound guided  Ultrasound image availability:  Not obtained due to urgency  Number of attempts: 1    Post-procedure:  Post-procedure: dressing applied  Waveform: good waveform  Post-procedure CNS: unchanged  Patient tolerance: patient tolerated the procedure well with no immediate complications

## 2025-05-01 NOTE — ASSESSMENT & PLAN NOTE
Wt Readings from Last 3 Encounters:   05/01/25 94.3 kg (208 lb)   04/24/25 94.3 kg (208 lb)   04/23/25 94.8 kg (209 lb)   Appears well compensated at this time.  Continue home GDMT and diuretics  Monitor volume status postoperatively

## 2025-05-01 NOTE — ASSESSMENT & PLAN NOTE
Status post Bilateral Navigated revision L3-S1 laminectomy/decompression, revision L3-S1 posterior instrumented spinal fusion with TLIF Date of Surgery 05/01/25    WBAT BLLE  PT/OT  Incentive spirometry  Pain control  DVT ppx: SQH  Diet: regular  Must wear LSO brace when out of bed  Lumbar spine precautions  Monitor drain output  Monitor for acute blood loss anemia and administer or recommend IVF/prbc as indicated for greater than 2 gram Hgb drop or Hgb < 7  Appreciate medicine team for medical comanagement  Dispo planning

## 2025-05-02 ENCOUNTER — APPOINTMENT (INPATIENT)
Dept: RADIOLOGY | Facility: HOSPITAL | Age: 65
DRG: 214 | End: 2025-05-02
Payer: OTHER MISCELLANEOUS

## 2025-05-02 PROBLEM — D64.9 ANEMIA: Status: ACTIVE | Noted: 2025-05-02

## 2025-05-02 PROBLEM — D72.829 LEUKOCYTOSIS: Status: ACTIVE | Noted: 2025-05-02

## 2025-05-02 LAB
ANION GAP SERPL CALCULATED.3IONS-SCNC: 10 MMOL/L (ref 4–13)
BASOPHILS # BLD AUTO: 0.02 THOUSANDS/ÂΜL (ref 0–0.1)
BASOPHILS NFR BLD AUTO: 0 % (ref 0–1)
BUN SERPL-MCNC: 12 MG/DL (ref 5–25)
CALCIUM SERPL-MCNC: 8.5 MG/DL (ref 8.4–10.2)
CHLORIDE SERPL-SCNC: 108 MMOL/L (ref 96–108)
CO2 SERPL-SCNC: 19 MMOL/L (ref 21–32)
CREAT SERPL-MCNC: 0.69 MG/DL (ref 0.6–1.3)
EOSINOPHIL # BLD AUTO: 0 THOUSAND/ÂΜL (ref 0–0.61)
EOSINOPHIL NFR BLD AUTO: 0 % (ref 0–6)
ERYTHROCYTE [DISTWIDTH] IN BLOOD BY AUTOMATED COUNT: 16 % (ref 11.6–15.1)
GFR SERPL CREATININE-BSD FRML MDRD: 90 ML/MIN/1.73SQ M
GLUCOSE SERPL-MCNC: 141 MG/DL (ref 65–140)
GLUCOSE SERPL-MCNC: 142 MG/DL (ref 65–140)
GLUCOSE SERPL-MCNC: 162 MG/DL (ref 65–140)
GLUCOSE SERPL-MCNC: 202 MG/DL (ref 65–140)
HCT VFR BLD AUTO: 33.4 % (ref 34.8–46.1)
HGB BLD-MCNC: 10.3 G/DL (ref 11.5–15.4)
IMM GRANULOCYTES # BLD AUTO: 0.08 THOUSAND/UL (ref 0–0.2)
IMM GRANULOCYTES NFR BLD AUTO: 1 % (ref 0–2)
LYMPHOCYTES # BLD AUTO: 1.49 THOUSANDS/ÂΜL (ref 0.6–4.47)
LYMPHOCYTES NFR BLD AUTO: 10 % (ref 14–44)
MCH RBC QN AUTO: 26.3 PG (ref 26.8–34.3)
MCHC RBC AUTO-ENTMCNC: 30.8 G/DL (ref 31.4–37.4)
MCV RBC AUTO: 85 FL (ref 82–98)
MONOCYTES # BLD AUTO: 1.11 THOUSAND/ÂΜL (ref 0.17–1.22)
MONOCYTES NFR BLD AUTO: 7 % (ref 4–12)
NEUTROPHILS # BLD AUTO: 12.85 THOUSANDS/ÂΜL (ref 1.85–7.62)
NEUTS SEG NFR BLD AUTO: 82 % (ref 43–75)
NRBC BLD AUTO-RTO: 0 /100 WBCS
PLATELET # BLD AUTO: 259 THOUSANDS/UL (ref 149–390)
PMV BLD AUTO: 9.8 FL (ref 8.9–12.7)
POTASSIUM SERPL-SCNC: 4.6 MMOL/L (ref 3.5–5.3)
RBC # BLD AUTO: 3.91 MILLION/UL (ref 3.81–5.12)
SODIUM SERPL-SCNC: 137 MMOL/L (ref 135–147)
WBC # BLD AUTO: 15.55 THOUSAND/UL (ref 4.31–10.16)

## 2025-05-02 PROCEDURE — 97116 GAIT TRAINING THERAPY: CPT

## 2025-05-02 PROCEDURE — 85025 COMPLETE CBC W/AUTO DIFF WBC: CPT

## 2025-05-02 PROCEDURE — 97163 PT EVAL HIGH COMPLEX 45 MIN: CPT

## 2025-05-02 PROCEDURE — 97530 THERAPEUTIC ACTIVITIES: CPT

## 2025-05-02 PROCEDURE — 99232 SBSQ HOSP IP/OBS MODERATE 35: CPT

## 2025-05-02 PROCEDURE — 99024 POSTOP FOLLOW-UP VISIT: CPT | Performed by: ORTHOPAEDIC SURGERY

## 2025-05-02 PROCEDURE — 97535 SELF CARE MNGMENT TRAINING: CPT

## 2025-05-02 PROCEDURE — 97167 OT EVAL HIGH COMPLEX 60 MIN: CPT

## 2025-05-02 PROCEDURE — 82948 REAGENT STRIP/BLOOD GLUCOSE: CPT

## 2025-05-02 PROCEDURE — 72100 X-RAY EXAM L-S SPINE 2/3 VWS: CPT

## 2025-05-02 PROCEDURE — 80048 BASIC METABOLIC PNL TOTAL CA: CPT

## 2025-05-02 RX ORDER — HEPARIN SODIUM 5000 [USP'U]/ML
5000 INJECTION, SOLUTION INTRAVENOUS; SUBCUTANEOUS EVERY 12 HOURS SCHEDULED
Status: DISCONTINUED | OUTPATIENT
Start: 2025-05-02 | End: 2025-05-06

## 2025-05-02 RX ORDER — INSULIN LISPRO 100 [IU]/ML
1-5 INJECTION, SOLUTION INTRAVENOUS; SUBCUTANEOUS
Status: DISCONTINUED | OUTPATIENT
Start: 2025-05-02 | End: 2025-05-06 | Stop reason: HOSPADM

## 2025-05-02 RX ORDER — INSULIN LISPRO 100 [IU]/ML
1-6 INJECTION, SOLUTION INTRAVENOUS; SUBCUTANEOUS
Status: DISCONTINUED | OUTPATIENT
Start: 2025-05-02 | End: 2025-05-06 | Stop reason: HOSPADM

## 2025-05-02 RX ADMIN — OXYCODONE HYDROCHLORIDE 10 MG: 5 TABLET ORAL at 10:20

## 2025-05-02 RX ADMIN — ACETAMINOPHEN 650 MG: 325 TABLET, FILM COATED ORAL at 06:05

## 2025-05-02 RX ADMIN — SENNOSIDES 8.6 MG: 8.6 TABLET, FILM COATED ORAL at 08:30

## 2025-05-02 RX ADMIN — HEPARIN SODIUM 5000 UNITS: 5000 INJECTION, SOLUTION INTRAVENOUS; SUBCUTANEOUS at 17:08

## 2025-05-02 RX ADMIN — PREGABALIN 75 MG: 75 CAPSULE ORAL at 08:30

## 2025-05-02 RX ADMIN — FLUTICASONE PROPIONATE 1 SPRAY: 50 SPRAY, METERED NASAL at 08:33

## 2025-05-02 RX ADMIN — PREGABALIN 75 MG: 75 CAPSULE ORAL at 22:06

## 2025-05-02 RX ADMIN — PANTOPRAZOLE SODIUM 40 MG: 40 TABLET, DELAYED RELEASE ORAL at 06:05

## 2025-05-02 RX ADMIN — ACETAMINOPHEN 650 MG: 325 TABLET, FILM COATED ORAL at 11:00

## 2025-05-02 RX ADMIN — CEFAZOLIN SODIUM 2000 MG: 2 SOLUTION INTRAVENOUS at 06:40

## 2025-05-02 RX ADMIN — LEVOTHYROXINE SODIUM 75 MCG: 75 TABLET ORAL at 08:30

## 2025-05-02 RX ADMIN — OXYCODONE HYDROCHLORIDE 10 MG: 5 TABLET ORAL at 04:39

## 2025-05-02 RX ADMIN — HEPARIN SODIUM 5000 UNITS: 5000 INJECTION, SOLUTION INTRAVENOUS; SUBCUTANEOUS at 08:30

## 2025-05-02 RX ADMIN — ISOSORBIDE MONONITRATE 30 MG: 30 TABLET, EXTENDED RELEASE ORAL at 08:30

## 2025-05-02 RX ADMIN — DOFETILIDE 125 MCG: 0.12 CAPSULE ORAL at 22:06

## 2025-05-02 RX ADMIN — INSULIN LISPRO 2 UNITS: 100 INJECTION, SOLUTION INTRAVENOUS; SUBCUTANEOUS at 11:00

## 2025-05-02 RX ADMIN — DOCUSATE SODIUM 100 MG: 100 CAPSULE, LIQUID FILLED ORAL at 08:30

## 2025-05-02 RX ADMIN — DOCUSATE SODIUM 100 MG: 100 CAPSULE, LIQUID FILLED ORAL at 17:08

## 2025-05-02 RX ADMIN — INSULIN LISPRO 1 UNITS: 100 INJECTION, SOLUTION INTRAVENOUS; SUBCUTANEOUS at 16:12

## 2025-05-02 RX ADMIN — ACETAMINOPHEN 650 MG: 325 TABLET, FILM COATED ORAL at 17:08

## 2025-05-02 RX ADMIN — OXYCODONE HYDROCHLORIDE 10 MG: 5 TABLET ORAL at 15:08

## 2025-05-02 RX ADMIN — ATORVASTATIN CALCIUM 80 MG: 80 TABLET, FILM COATED ORAL at 17:08

## 2025-05-02 RX ADMIN — DOFETILIDE 125 MCG: 0.12 CAPSULE ORAL at 08:33

## 2025-05-02 RX ADMIN — ACETAMINOPHEN 650 MG: 325 TABLET, FILM COATED ORAL at 23:03

## 2025-05-02 RX ADMIN — PANTOPRAZOLE SODIUM 40 MG: 40 TABLET, DELAYED RELEASE ORAL at 15:08

## 2025-05-02 RX ADMIN — OXYCODONE HYDROCHLORIDE 10 MG: 5 TABLET ORAL at 20:29

## 2025-05-02 RX ADMIN — PREGABALIN 75 MG: 75 CAPSULE ORAL at 15:08

## 2025-05-02 NOTE — ASSESSMENT & PLAN NOTE
Continue Toprol 50mg qhs, tikosyn 125mcg BID  Eliquis on hold -- resume when cleared by primary team

## 2025-05-02 NOTE — ASSESSMENT & PLAN NOTE
Status post Bilateral Navigated revision L3-S1 laminectomy/decompression, revision L3-S1 posterior instrumented spinal fusion with TLIF Date of Surgery 05/02/25    WBAT BLLE  PT/OT  Incentive spirometry  Pain control  DVT ppx: SQH  Diet: regular  Must wear LSO brace when out of bed  Lumbar spine precautions  Monitor drain output  Monitor for acute blood loss anemia and administer or recommend IVF/prbc as indicated for greater than 2 gram Hgb drop or Hgb < 7  Appreciate medicine team for medical comanagement  Dispo planning

## 2025-05-02 NOTE — PROGRESS NOTES
Progress Note - Orthopedics   Name: Vesna Langston 65 y.o. female I MRN: [id number]  Unit/Bed#: -01 I Date of Admission: 5/1/2025   Date of Service: 5/2/2025 I Hospital Day: 1    Assessment & Plan  Status post lumbar spinal fusion  Status post Bilateral Navigated revision L3-S1 laminectomy/decompression, revision L3-S1 posterior instrumented spinal fusion with TLIF Date of Surgery 05/02/25    WBAT BLLE  PT/OT  Incentive spirometry  Pain control  DVT ppx: SQH  Diet: regular  Must wear LSO brace when out of bed  Lumbar spine precautions  Monitor drain output  Monitor for acute blood loss anemia and administer or recommend IVF/prbc as indicated for greater than 2 gram Hgb drop or Hgb < 7  Appreciate medicine team for medical comanagement  Dispo planning    Atrial flutter, paroxysmal (HCC)    HFrEF (heart failure with reduced ejection fraction) (HCC)  Wt Readings from Last 3 Encounters:   05/01/25 94.3 kg (208 lb)   04/24/25 94.3 kg (208 lb)   04/23/25 94.8 kg (209 lb)             Acquired hypothyroidism    Class 1 obesity due to excess calories with serious comorbidity and body mass index (BMI) of 34.0 to 34.9 in adult    Other pulmonary embolism without acute cor pulmonale, unspecified chronicity (HCC)    Coronary artery disease involving native coronary artery of native heart      Please contact the SecureChat role for the Orthopedics service with any questions/concerns.    Subjective   No acute events, no acute distress. Denies fever/chills, SOB. Pain well controlled.     Objective      Temp:  [96.8 °F (36 °C)-97.8 °F (36.6 °C)] 97.6 °F (36.4 °C)  HR:  [70-93] 70  BP: (107-135)/(60-84) 120/65  Resp:  [13-27] 16  SpO2:  [92 %-100 %] 95 %  O2 Device: None (Room air)  Nasal Cannula O2 Flow Rate (L/min):  [3 L/min] 3 L/min  O2 Device: None (Room air)          I/O         04/30 0701 05/01 0700 05/01 0701 05/02 0700 05/02 0701 05/03 0700    I.V. (mL/kg)  2450 (26)     IV Piggyback  500     Total Intake(mL/kg)  2950  "(31.3)     Urine (mL/kg/hr)  3095     Drains  255     Total Output  3350     Net  -400                  Lines/Drains/Airways       Active Status       Name Placement date Placement time Site Days    Closed/Suction Drain Left Back Bulb 19 Fr. 05/01/25  1615  Back  less than 1                  Physical Exam   Musculoskeletal: Bilateral Lower Extremity  Dressing C/D/I  TTP suzanna-incisionally  Sensation intact to light touch L3-S1  Motor intact L2-S1 -- left sided 3/5 strength ankle DF, EHL  2+ DP pulse  No calf swelling or ttp  Greyson drain in place with ss output      Lab Results: I have reviewed the following results:  Recent Labs     05/02/25  0521   WBC 15.55*   HGB 10.3*   HCT 33.4*      BUN 12   CREATININE 0.69     Blood Culture:  No results found for: \"BLOODCX\"  Wound Culture: No results found for: \"WOUNDCULT\"      "

## 2025-05-02 NOTE — ASSESSMENT & PLAN NOTE
S/p stent in 3/2024  Continue Toprol-XL 50mg daily, imdur 30mg BID, plavix, statin  Plavix on hold until clearance per ortho

## 2025-05-02 NOTE — PLAN OF CARE
Problem: PHYSICAL THERAPY ADULT  Goal: Performs mobility at highest level of function for planned discharge setting.  See evaluation for individualized goals.  Description: Treatment/Interventions: ADL retraining, LE strengthening/ROM, Functional transfer training, Elevations, Therapeutic exercise, Endurance training, Patient/family training, Bed mobility, Gait training, Spoke to nursing, Spoke to case management, OT  Equipment Recommended: Walker       See flowsheet documentation for full assessment, interventions and recommendations.  Note: Prognosis: Good  Problem List: Decreased strength, Decreased endurance, Impaired balance, Decreased mobility, Decreased skin integrity, Orthopedic restrictions, Pain  Assessment: PT completed evaluation of 65 y.o. female admitted to [facility] on 5/1/2025 with Status post lumbar spinal fusion. Patient's current status instabilities include multiple lines, spinal precautions, LSO OOB, WBAT b/l LE, ongoing pain, continuous O2/HR monitoring, regression in function from baseline. status post bilateral navigated revision L3-S1 laminectomy/decompression, revision L3-S1 posterior instrumented spinal fusion with TLIF on 5/1/2025. Patient  has a past medical history of  ST elevation myocardial infarction involving left anterior descending (LAD) coronary artery (HCC) (03/22/2024), Tobacco abuse, and Visual impairment (1967). At baseline, pt resides alone in [facility] with [facility] and was independent ADLs prior to hospital admission. Patient presents at time of PT evaluation functioning below baseline and currently w/ overall mobility deficits due to: impaired balance, decreased endurance, gait dysfunction, decreased activity tolerance and fall risk. During PT evaluation, patient currently is requiring supervision for bed mobility skills;  supervision for functional transfers and  contact guard assist for ambulation with RW. Patient performed supine to sit to edge of bed requiring HOB  elevated, verbal cues for set up, increased time, use of bed rails (completed log roll for compliance with spinal precautions). Patient maintained static/dynamic sitting balance ~5 mins, with use of b/l UE for support and balance during dynamic activities. LSO donned sitting EOB. Patient ambulated 15' with RW, requiring occasional verbal cues for RW management, navigation, and improving step/stride length. See treatment note for further functional mobility and ambulation. Pt left in the bathroom with all needs met. This patient is functioning below their baseline and is recommended for level I vs 3 pending stair negotiation trial. Patient will benefit from continued skilled PT this admission to achieve maximal function and safety. The patients AM-PAC Basic Mobility Inpatient Short From Raw Score is 16 . Based on AM-PAC scoring and patient presentation, PT currently recommending Level I (Maximum Resource Intensity). Please also refer to the recommendation of the Physical Therapist for safe discharge planning.  Barriers to Discharge: Decreased caregiver support, Inaccessible home environment     Rehab Resource Intensity Level, PT: I (Maximum Resource Intensity) (level 1 vs 3 (pending stair negotiation trial; multi-level home, lives alone, hx of falls))    See flowsheet documentation for full assessment.

## 2025-05-02 NOTE — ASSESSMENT & PLAN NOTE
Wt Readings from Last 3 Encounters:   05/01/25 94.3 kg (208 lb)   04/24/25 94.3 kg (208 lb)   04/23/25 94.8 kg (209 lb)     Appears well compensated  Home regimen: jardiance 25mg daily, entresto, PRN lasix   Monitor volume status postoperatively  Continue GDMT as per home regimen - entresto and jardiance on hold, can resume on discharge  Only uses Lasix as needed

## 2025-05-02 NOTE — PHYSICAL THERAPY NOTE
Physical Therapy Evaluation + Treatment     Patient's Name: Vesna Langston    Admitting Diagnosis  History of lumbar spinal fusion [Z98.1]  Radiculopathy, lumbar region [M54.16]    Problem List  Patient Active Problem List   Diagnosis    Atrial flutter, paroxysmal (HCC)    Mixed hyperlipidemia    History of tobacco abuse    Ischemic cardiomyopathy    Chronic low back pain    HFrEF (heart failure with reduced ejection fraction) (HCC)    S/P ICD (internal cardiac defibrillator) procedure    Back pain    Sciatica of left side    History of sustained ventricular tachycardia    Coronary artery disease involving native coronary artery of native heart    S/P coronary artery stent placement    Type 2 diabetes mellitus, without long-term current use of insulin (HCC)    Acquired hypothyroidism    History of pulmonary embolism    History of gastric ulcer    Ganglion cyst of finger of left hand    Class 1 obesity due to excess calories with serious comorbidity and body mass index (BMI) of 34.0 to 34.9 in adult    POP (obstructive sleep apnea)    Localized edema    Primary osteoarthritis of right knee    History of torn meniscus of right knee    Chronic pain of right knee    Numbness and tingling in left arm    Stage 2 chronic kidney disease    Other pulmonary embolism without acute cor pulmonale, unspecified chronicity (HCC)    Acute on chronic systolic (congestive) heart failure (HCC)    Obesity, morbid (HCC)    Type 2 diabetes mellitus with stage 3 chronic kidney disease, without long-term current use of insulin, unspecified whether stage 3a or 3b CKD (HCC)    Other spondylosis with radiculopathy, lumbar region    Status post lumbar spinal fusion    Leukocytosis    Anemia       Past Medical History  Past Medical History:   Diagnosis Date    Acute respiratory insufficiency 03/22/2024    Allergic     Anxiety 4/24    Arrhythmia 3/22/24    Atrial fibrillation (HCC) 3/22/24    Back pain 2022    Bradycardia 3/22/24    Breast cyst  2000    Chronic HFrEF (heart failure with reduced ejection fraction) ([facility])     Clotting disorder ([facility]) 4/1/24    Coronary artery disease     COVID-19 virus infection 11/28/2022    Depression 4/24    Diabetes mellitus ([facility])     Disease of thyroid gland 4/09/2024    Environmental and seasonal allergies 1/1/1960    GERD (gastroesophageal reflux disease) 10/09    GI (gastrointestinal bleed) 4/1/24    Heart disease 3/24    History of placement of internal cardiac defibrillator 3/27/24    Hyperlipidemia     Hypoglycemia     ICD (implantable cardioverter-defibrillator) in place     Ischemic cardiomyopathy     Lactic acidosis 03/22/2024    Migraine 1980    Morning headache 1980    Myocardial infarction ([facility]) 3/22/2024    Nausea & vomiting 04/03/2025    Otitis media 1966    Pacemaker 3/27/24    Peptic ulceration 4/2024    Seasonal allergies     Sinus problem 1970    ST elevation myocardial infarction involving left anterior descending (LAD) coronary artery ([facility]) 03/22/2024    Tobacco abuse     Visual impairment 1967       Past Surgical History  Past Surgical History:   Procedure Laterality Date    ADENOIDECTOMY      APPENDECTOMY      APPENDECTOMY LAPAROSCOPIC N/A 05/08/2024    Procedure: APPENDECTOMY LAPAROSCOPIC;  Surgeon: Lauryn Ullrich, DO;  Location: [facility];  Service: General    BACK SURGERY  8/23    BREAST EXCISIONAL BIOPSY Right 09/2000    cyst removed- benign    CARDIAC CATHETERIZATION N/A 03/22/2024    Procedure: Cardiac pci;  Surgeon: Gabriel Myers MD;  [facility]: [facility];  Service: Cardiology    CARDIAC CATHETERIZATION N/A 03/22/2024    Procedure: Cardiac Coronary Angiogram;  Surgeon: Gabriel Myers MD;  [facility];  Service: Cardiology    CARDIAC CATHETERIZATION N/A 03/22/2024    Procedure: Cardiac PCI AMI;  Surgeon: Gabriel Myers MD;  [facility];  Service: Cardiology    CARDIAC CATHETERIZATION N/A 03/22/2024    Procedure: Cardiac IVUS;  Surgeon: Gabriel Myers MD;   [facility];  Service: Cardiology    CARDIAC DEFIBRILLATOR PLACEMENT      CARDIAC ELECTROPHYSIOLOGY PROCEDURE N/A 2024    Procedure: Cardiac icd implant;  Surgeon: Jeffy Lama MD;  [facility];  Service: Cardiology     SECTION      COLONOSCOPY      ECTOPIC PREGNANCY SURGERY      INSERT / REPLACE / REMOVE PACEMAKER      LUMBAR FUSION Bilateral 2025    Procedure: Navigated revision L3-S1 laminectomy/decompression, revision L3-S1 posterior instrumented spinal fusion with TLIF;  Surgeon: Charles Mcrae MD;  Location: [facility];  Service: Orthopedics    MASS EXCISION Left 10/18/2024    Procedure: EXCISION BIOPSY TISSUE LESION/MASS UPPER EXTREMITY - Left index finger;  Surgeon: Leandro Campos MD;  Location:  MAIN OR;  Service: Orthopedics    SEPTOPLASTY      SPINE SURGERY  23    TONSILLECTOMY      TONSILLECTOMY AND ADENOIDECTOMY  1964    TRIGGER POINT INJECTION      UPPER GASTROINTESTINAL ENDOSCOPY  24 0850   PT Last Visit   PT Visit Date 25   Note Type   Note type Evaluation   Additional Comments 65 y.o. female admitted to [facility] on 2025 with Status post lumbar spinal fusion.   Pain Assessment   Pain Assessment Tool 0-10   Pain Score 3   Pain Location/Orientation Location: Back   Pain Onset/Description Onset: Ongoing   Effect of Pain on Daily Activities limits functional mobility   Patient's Stated Pain Goal No pain   Hospital Pain Intervention(s) Repositioned;Ambulation/increased activity;Emotional support;Rest   Restrictions/Precautions   Weight Bearing Precautions Per Order Yes   RLE Weight Bearing Per Order WBAT   LLE Weight Bearing Per Order WBAT   Braces or Orthoses (S)  LSO   Other Precautions Chair Alarm;Bed Alarm;WBS;Multiple lines;Fall Risk;Pain;Spinal precautions   Home Living   Type of Home House   Home Layout Multi-level;Bed/bath upstairs  (3SH, 2STE)   Bathroom Shower/Tub Walk-in shower  (full  bath on 1st floor)   Bathroom Toilet Raised   Bathroom Equipment Grab bars in shower;Built-in shower seat   Bathroom Accessibility Accessible   Home Equipment Walker;Cane;Other (Comment);Grab bars  (rollator)   Additional Comments At baseline, pt resides alone in H with 2 [facility] and was independent ADLs prior to hospital admission.   Prior Function   Level of [place] Independent with ADLs;Independent with functional mobility;Needs assistance with IADLS   Lives With (S)  Alone   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;Friend(s)   IADLs Independent with driving;Independent with meal prep;Independent with medication management   Falls in the last 6 months (S)  >10  (reports 12)   Vocational Workers comp   General   Additional Pertinent History Patient  has a past medical history of Acute respiratory insufficiency (03/22/2024), Allergic, Anxiety (4/24), Arrhythmia (3/22/24), Atrial fibrillation (HCC) (3/22/24), Back pain (2022), Bradycardia (3/22/24), Breast cyst (2000), Chronic HFrEF (heart failure with reduced ejection fraction) (HCC), Clotting disorder ([facility]) (4/1/24), Coronary artery disease, COVID-19 virus infection (11/28/2022), Depression (4/24), Diabetes mellitus (HCC), Disease of thyroid gland (4/09/2024), Environmental and seasonal allergies (1/1/1960), GERD (gastroesophageal reflux disease) (10/09), GI (gastrointestinal bleed) (4/1/24), Heart disease (3/24), History of placement of internal cardiac defibrillator (3/27/24), Hyperlipidemia, Hypoglycemia, ICD (implantable cardioverter-defibrillator) in place, Ischemic cardiomyopathy, Lactic acidosis (03/22/2024), Migraine (1980), Morning headache (1980), Myocardial infarction ([facility]) (3/22/2024), Nausea & vomiting (04/03/2025), Otitis media (1966), Pacemaker (3/27/24), Peptic ulceration (4/2024), Seasonal allergies, Sinus problem (1970), ST elevation myocardial infarction involving left anterior descending (LAD) coronary artery ([facility]) (03/22/2024), Tobacco abuse, and Visual  "impairment (1967).   Family/Caregiver Present No   Cognition   Overall Cognitive Status WFL   Arousal/Participation Alert   Orientation Level Oriented X4   Memory Within functional limits   Following Commands Follows all commands and directions without difficulty   Comments patient pleasant and cooperative, fair insight of functional deficits and safety awareness   Subjective   Subjective \"My L leg keeps acting up, but other than that I am doing okay\"   RLE Assessment   RLE Assessment   (4-/5 grossly)   LLE Assessment   LLE Assessment   (4-/5 grossly)   Bed Mobility   Rolling L 5  Supervision   Additional items Bedrails;Increased time required;Verbal cues  (log roll)   Supine to Sit 5  Supervision   Additional items HOB elevated;Bedrails;Increased time required;Verbal cues   Sit to Supine Unable to assess   Additional Comments patient left OOB in bedside chair with alarm and all needs met  (LSO donned seated at EOB)   Transfers   Sit to Stand 5  Supervision   Additional items Increased time required;Verbal cues   Stand to Sit 5  Supervision   Additional items Increased time required;Verbal cues   Toilet transfer 5  Supervision   Additional items Increased time required;Verbal cues;Standard toilet  (grab bars)   Additional Comments x4 STS with RW   Ambulation/Elevation   Gait pattern Forward Flexion;Antalgic;L Foot drag;Decreased foot clearance;Decreased L stance;Short stride;Inconsistent kana;Excessively slow;Step through pattern   Gait Assistance 4  Minimal assist  (CGA)   Additional items Assist x 1;Verbal cues;Tactile cues   Assistive Device Rolling walker   Distance 15'x2 + 70'x2  (see treatment note)   Stair Management Assistance Not tested  (patient declined at this time, trial next treatment session as able 3SH with bed/bath on 2nd floor, 2 AYESHA)   Ambulation/Elevation Additional Comments Patient ambulated 15' with RW, requiring occasional verbal cues for RW management, navigation, and improving step/stride " length. See treatment note for further functional mobility and ambulation.   Balance   Static Sitting Good   Dynamic Sitting Fair +   Static Standing Fair   Dynamic Standing Fair -   Ambulatory Poor +   Endurance Deficit   Endurance Deficit Yes   Endurance Deficit Description generalized weakness, fatigue, pain, spinal precautions   Activity Tolerance   Activity Tolerance Patient limited by fatigue;Patient tolerated treatment well   Medical Staff Made Aware AMBERLY Macias; This high complexity evaluation was performed with an occupational therapist due to the patient's co-morbidities, clinically unstable presentation, and present impairments which are a regression from the patient's baseline.   Nurse Made Aware RN cleared and updated   Assessment   Prognosis Good   Problem List Decreased strength;Decreased endurance;Impaired balance;Decreased mobility;Decreased skin integrity;Orthopedic restrictions;Pain   Assessment PT completed evaluation of 65 y.o. female admitted to [facility] on 5/1/2025 with Status post lumbar spinal fusion. Patient's current status instabilities include multiple lines, spinal precautions, LSO OOB, WBAT b/l LE, ongoing pain, continuous O2/HR monitoring, regression in function from baseline. status post bilateral navigated revision L3-S1 laminectomy/decompression, revision L3-S1 posterior instrumented spinal fusion with TLIF on 5/1/2025. Patient  has a past medical history of  ST elevation myocardial infarction involving left anterior descending (LAD) coronary artery ([facility]) (03/22/2024), Tobacco abuse, and Visual impairment (1967). At baseline, pt resides alone in [facility] with 2 [facility] and was independent ADLs prior to hospital admission.       Patient presents at time of PT evaluation functioning below baseline and currently w/ overall mobility deficits due to: impaired balance, decreased endurance, gait dysfunction, decreased activity tolerance and fall risk. During PT evaluation, patient  currently is requiring supervision for bed mobility skills;  supervision for functional transfers and  contact guard assist for ambulation with RW. Patient performed supine to sit to edge of bed requiring HOB elevated, verbal cues for set up, increased time, use of bed rails (completed log roll for compliance with spinal precautions). Patient maintained static/dynamic sitting balance ~5 mins, with use of b/l UE for support and balance during dynamic activities. LSO donned sitting EOB. Patient ambulated 15' with RW, requiring occasional verbal cues for RW management, navigation, and improving step/stride length. See treatment note for further functional mobility and ambulation. Pt left in the bathroom with all needs met. T      his patient is functioning below their baseline and is recommended for level I vs 3 pending stair negotiation trial. Patient will benefit from continued skilled PT this admission to achieve maximal function and safety. The patients AM-PAC Basic Mobility Inpatient Short From Raw Score is 16 . Based on AM-PAC scoring and patient presentation, PT currently recommending Level I (Maximum Resource Intensity). Please also refer to the recommendation of the Physical Therapist for safe discharge planning.   Barriers to Discharge Decreased caregiver support;Inaccessible home environment   Goals   Patient Goals to improve functional independence   STG Expiration Date 05/16/25   Short Term Goal #1 1) Perform bed mobility mod-I to participate in frequent repositioning and improve skin integrity; 2) Perform functional transfers mod-I to promote I with toileting and OOB mobility; 3) Ambulate 200 feet mod-I with least restrictive device to participate in household and community level mobility; 4) Improve b/l LE strength by 1/2 grade in order to improve efficiency of transfers; 5) Improve balance by 1 grade to reduce risk for falls; 6) Improve overall activity tolerance to 60 minutes in order to increase  patient's ability to engage in mobility tasks; 7) Navigate 12 steps S level in order to safely navigate multiple floors at home   PT Treatment Day 0   Plan   Treatment/Interventions ADL retraining;LE strengthening/ROM;Functional transfer training;Elevations;Therapeutic exercise;Endurance training;Patient/family training;Bed mobility;Gait training;Spoke to nursing;Spoke to case management;OT   PT Frequency 3-5x/wk   Discharge Recommendation   Rehab Resource Intensity Level, PT I (Maximum Resource Intensity)  (level 1 vs 3 (pending stair negotiation trial; multi-level home, lives alone, hx of falls))   Equipment Recommended Walker   Walker Package Recommended Wheeled walker   Change/add to Walker Package? No   AM-PAC Basic Mobility Inpatient   Turning in Flat Bed Without Bedrails 3   Lying on Back to Sitting on Edge of Flat Bed Without Bedrails 3   Moving Bed to Chair 3   Standing Up From Chair Using Arms 3   Walk in Room 2   Climb 3-5 Stairs With Railing 2   Basic Mobility Inpatient Raw Score 16   Basic Mobility Standardized Score 38.32   [facility] Highest Level Of Mobility   -[facility] Goal 5: Stand one or more mins   -HL Achieved 7: Walk 25 feet or more   Additional Treatment Session   Start Time 0823   End Time 0850   Treatment Assessment PT initiated treatment session in order to assist patient in achieving goals to improve transfers, gait training, and overall activity tolerance. Patient pleasant, cooperative, and agreeable to today's treatment session. Patient received in the bathroom. Patient is currently supervision bed mobility, transfers, and CGA ambulation. Throughout treatment session patient required both verbal and tactile cuing to improve safety, efficiency, and mechanics of mobility in addition to hands on assistance for all aspects of functional mobility. Additionally, pt required increased time to execute specific mobility tasks with rest breaks in between secondary to gross fatigue and weakness.  Patient maintained static/dynamic sitting/standing balance ~5 mins, performing ADLs and hygiene with use of b/l UE for support and balance during dynamic activities. Patient demonstrated the ability to ambulate 15' + 70'x2 with RW, requiring intermittent verbal cues for RW management and hallway navigation to improve patient safety and reduce risk of falls. Patient required intermittent standing rest breaks due to generalized LE weakness and decreased activity tolerance. Patient left OOB in bedside chair with alarm and all needs met.       Patient will benefit from continued skilled physical therapy to address gait / balance dysfunction, decreased activity tolerance, and generalized weakness. The patients AM-PAC Basic Mobility Inpatient Short From Raw Score is 16 .  Based on AM-PAC scoring and patient presentation, PT currently recommending Level I (Maximum Resource Intensity). Please also refer to the recommendation of the Physical Therapist for safe discharge planning.   Equipment Use rw   Additional Treatment Day 1   End of Consult   Patient Position at End of Consult Bedside chair;Bed/Chair alarm activated;All needs within reach         Seema Barry PT, DPT

## 2025-05-02 NOTE — ASSESSMENT & PLAN NOTE
Hgb 13.5 (4/10) --> 10.3 on am labs this morning  Likely ABLA in the setting of recent surgery  Continue monitoring DARWIN drain output   Repeat CBC in the am

## 2025-05-02 NOTE — ASSESSMENT & PLAN NOTE
Patient presented with back pain status post bilateral navigated revision L3-S1 laminectomy/decompression, revision L3-S1 posterior instrumented spinal fusion with TLIF on 5/1/2025  Management per orthopedic surgery  WBAT BLLE  PT/OT eval  LSO brace OOB

## 2025-05-02 NOTE — ASSESSMENT & PLAN NOTE
WBC 15.5 on am labs  Likely reactive in the setting of recent procedure  Remains afebrile and without additional s/sx of infection   Continue to monitor on CBC daily

## 2025-05-02 NOTE — ARC ADMISSION
Referral received for consideration of patient for inpatient acute rehab.    Reviewed patient's case - patient is likely acute rehab appropriate pending medical stability, functional progress, insurance authorization (workers comp claim) and bed availability. CM has been updated. Will continue to follow at this time.

## 2025-05-02 NOTE — PROGRESS NOTES
Pastoral Care Progress Note             05/02/25 1400   Clinical Encounter Type   Visited With Patient   Routine Visit Introduction   Referral From Nurse   Oriental orthodox Encounters   Oriental orthodox Needs Prayer      met with pt at nurse suggestion and pt shared stories of God showing up for her and the blessing that she has experienced in her stay here through staff.  prayed with pt and remains available.

## 2025-05-02 NOTE — PLAN OF CARE
Problem: PAIN - ADULT  Goal: Verbalizes/displays adequate comfort level or baseline comfort level  Description: Interventions:- Encourage patient to monitor pain and request assistance- Assess pain using appropriate pain scale- Administer analgesics based on type and severity of pain and evaluate response- Implement non-pharmacological measures as appropriate and evaluate response- Consider cultural and social influences on pain and pain management- Notify physician/advanced practitioner if interventions unsuccessful or patient reports new pain  Outcome: Progressing     Problem: SAFETY ADULT  Goal: Patient will remain free of falls  Description: INTERVENTIONS:- Educate patient/family on patient safety including physical limitations- Instruct patient to call for assistance with activity - Consult OT/PT to assist with strengthening/mobility - Keep Call bell within reach- Keep bed low and locked with side rails adjusted as appropriate- Keep care items and personal belongings within reach- Initiate and maintain comfort rounds- Make Fall Risk Sign visible to staff- Offer Toileting every 2 Hours, in advance of need- Initiate/Maintain bed/chair alarm- Obtain necessary fall risk management equipment: nonskid footwear- Apply yellow socks and bracelet for high fall risk patients- Consider moving patient to room near nurses station  Outcome: Progressing  Goal: Maintain or return to baseline ADL function  Description: INTERVENTIONS:-  Assess patient's ability to carry out ADLs; assess patient's baseline for ADL function and identify physical deficits which impact ability to perform ADLs (bathing, care of mouth/teeth, toileting, grooming, dressing, etc.)- Assess/evaluate cause of self-care deficits - Assess range of motion- Assess patient's mobility; develop plan if impaired- Assess patient's need for assistive devices and provide as appropriate- Encourage maximum independence but intervene and supervise when necessary-  Involve family in performance of ADLs- Assess for home care needs following discharge - Consider OT consult to assist with ADL evaluation and planning for discharge- Provide patient education as appropriate  Outcome: Progressing  Goal: Maintains/Returns to pre admission functional level  Description: INTERVENTIONS:- Perform AM-PAC 6 Click Basic Mobility/ Daily Activity assessment daily.- Set and communicate daily mobility goal to care team and patient/family/caregiver. - Collaborate with rehabilitation services on mobility goals if consulted- Perform Range of Motion 3 times a day.- Reposition patient every 2 hours.- Dangle patient 3 times a day- Stand patient 3 times a day- Ambulate patient 3 times a day- Out of bed to chair 3 times a day - Out of bed for meals 3 times a day- Out of bed for toileting- Record patient progress and toleration of activity level   Outcome: Progressing     Problem: DISCHARGE PLANNING  Goal: Discharge to home or other facility with appropriate resources  Description: INTERVENTIONS:- Identify barriers to discharge w/patient and caregiver- Arrange for needed discharge resources and transportation as appropriate- Identify discharge learning needs (meds, wound care, etc.)- Arrange for interpretive services to assist at discharge as needed- Refer to Case Management Department for coordinating discharge planning if the patient needs post-hospital services based on physician/advanced practitioner order or complex needs related to functional status, cognitive ability, or social support system  Outcome: Progressing     Problem: Knowledge Deficit  Goal: Patient/family/caregiver demonstrates understanding of disease process, treatment plan, medications, and discharge instructions  Description: Complete learning assessment and assess knowledge base.Interventions:- Provide teaching at level of understanding- Provide teaching via preferred learning methods  Outcome: Progressing

## 2025-05-02 NOTE — CASE MANAGEMENT
Case Management Assessment & Discharge Planning Note    Patient name Vesna Langston  Location /-01 MRN [id number]  : 1960 Date 2025       Current Admission Date: 2025  Current Admission Diagnosis:Status post lumbar spinal fusion   Patient Active Problem List    Diagnosis Date Noted Date Diagnosed    Leukocytosis 2025     Anemia 2025     Status post lumbar spinal fusion 2025     Other spondylosis with radiculopathy, lumbar region 03/10/2025     Stage 2 chronic kidney disease 2025     Other pulmonary embolism without acute cor pulmonale, unspecified chronicity ([facility]) 2025     Acute on chronic systolic (congestive) heart failure ([facility]) 2025     Obesity, morbid ([facility]) 2025     Type 2 diabetes mellitus with stage 3 chronic kidney disease, without long-term current use of insulin, unspecified whether stage 3a or 3b CKD ([facility]) 2025     Numbness and tingling in left arm 2024     Primary osteoarthritis of right knee 10/08/2024     History of torn meniscus of right knee 10/08/2024     Chronic pain of right knee 10/08/2024     Localized edema 10/02/2024     POP (obstructive sleep apnea) 2024     Class 1 obesity due to excess calories with serious comorbidity and body mass index (BMI) of 34.0 to 34.9 in adult 2024     Ganglion cyst of finger of left hand 08/15/2024     History of pulmonary embolism 2024     History of gastric ulcer 2024     Acquired hypothyroidism 2024     Type 2 diabetes mellitus, without long-term current use of insulin ([facility]) 2024     S/P ICD (internal cardiac defibrillator) procedure 2024     Chronic low back pain 2024     HFrEF (heart failure with reduced ejection fraction) ([facility]) 2024     Ischemic cardiomyopathy 2024     Coronary artery disease involving native coronary artery of native heart 2024     S/P coronary artery stent placement 2024     Atrial  flutter, paroxysmal (HCC) 03/22/2024     Mixed hyperlipidemia 03/22/2024     History of tobacco abuse 03/22/2024     History of sustained ventricular tachycardia 03/22/2024     Back pain 07/31/2022     Sciatica of left side 07/31/2022       LOS (days): 1  Geometric Mean LOS (GMLOS) (days): 4  Days to GMLOS:3     OBJECTIVE:    Risk of Unplanned Readmission Score: 23.73         Current admission status: Inpatient       Preferred Pharmacy:   [id number] [place] - [address] [address]  Phone: [phone] Fax: [phone]    [id number] [address]  [address] [facility] [place] [address]  Phone: [phone] Fax: [phone]    Primary Care Provider: Melissa Montoya DO    Primary Insurance: MEDICARE  Secondary Insurance: BLUE CROSS    ASSESSMENT:  Active Health Care Proxies    There are no active Health Care Proxies on file.                 Readmission Root Cause  30 Day Readmission: No    Patient Information  Admitted from:: Home  Mental Status: Alert  During Assessment patient was accompanied by: Not accompanied during assessment  Assessment information provided by:: Patient  Primary Caregiver: Self  Support Systems: Self, Family members  What [place] you [place] in[place]  Home entry access options. Select all that apply.: Stairs  Number of steps to enter home.: 2  Type of Current Residence: 3 [place] home  Upon entering residence, is there a bedroom on the main floor (no further steps)?: No  A bedroom is located on the following floor levels of residence (select all that apply):: 2nd Floor  Upon entering residence, is there a bathroom on the main floor (no further steps)?: Yes  Number of steps to 2nd floor from main floor: One Flight  Living Arrangements: Lives Alone    Activities of Daily Living Prior to Admission  Functional Status: Independent  Completes ADLs independently?: Yes  Ambulates independently?: Yes          Patient Information Continued  Does patient have prescription coverage?: Yes  Can the patient afford their medications and any related supplies (such as glucometers or test strips)?: Yes  Does patient receive dialysis treatments?: No  Does patient have a history of Mental Health Diagnosis?: No         Means of Transportation  Means of Transport to Appts:: Family transport      DISCHARGE DETAILS:    Discharge planning discussed with:: Patient  Freedom of Choice: Yes  Comments - Freedom of Choice: Discussed FOC  CM contacted family/caregiver?: No- see comments (Declined)  Were Treatment Team discharge recommendations reviewed with patient/caregiver?: Yes  Did patient/caregiver verbalize understanding of patient care needs?: N/A- going to facility  Were patient/caregiver advised of the risks associated with not following Treatment Team discharge recommendations?: Yes     This CM met w/pt at bedside to introduce self & CM role. Confirmed demographics & PCP.  Pt lives alone in 3 - 4 story home w/2 AYESHA. Bedroom on 2nd floor w/full flight of stairs plus 2 more to bedroom.  Pt's home built in 1850 - has narrow stairway - railing on 1 side.  Pt has full bath w/walk in shower on main floor w/laundry room in same area.  Pt states there is no room to put a hospital bed on 1st floor. Pt has 2 sons and a dtr that are able to assist w/some things but are not available all they time as they work & have their own family.   Pt stated she would like some rehab prior to going home to be able to ambulate stairs to 2nd floor in her home. Pt stated she would be able to stay w/her dtr a few days after rehab to help if needed.  Pt's dtr has a split level home w/6+6 steps to kitchen area. Able to stay on lower level w/bath & power recliner chair and no steps to enter.  Pt would like to stay at  for acute rehab.  Agreeable to referral to  [facility].      Pt stated she was injured at work at [employer] in July 2022.  Workers comp contact is Jacqueline  Stamp phone [phone], fax # [phone].  Pt has a cleaning lady that comes once a week and a  to take care of the yardwork. Pt stated her grandsons (12 & 7) helped plant some vegetables in garden.      CM notified therapy of above. Therapy to see pt to work on stair training while IP.  CM sent referral to [facility] for review pending medical course - awaiting response.  CM called  contact Jacqueline Vega [phone] @ 1021 am today - no answer - left  requesting return call to confirm payer for post acute placement.  Awaiting call back.  CM to follow for dcp needs pending medical course.

## 2025-05-02 NOTE — PLAN OF CARE
"  Problem: OCCUPATIONAL THERAPY ADULT  Goal: Performs self-care activities at highest level of function for planned discharge setting.  See evaluation for individualized goals.  Description: Treatment Interventions: ADL retraining, Endurance training, Patient/family training, Equipment evaluation/education, Compensatory technique education, Continued evaluation, Energy conservation, Activityengagement          See flowsheet documentation for full assessment, interventions and recommendations.   Note: Limitation: Decreased ADL status, Decreased endurance, Decreased self-care trans, Decreased high-level ADLs  Prognosis: Fair  Assessment: Pt is a 64 yo female admitted to [facility] s/p \"Bilateral Navigated revision L3-S1 laminectomy/decompression, revision L3-S1 posterior instrumented spinal fusion with TLIF\" on 5/2 and is WBAT in b/l LE and has LSO when OOB per ortho. Pt with active OT orders in which OT consulted to assess pt's functional status and occupational performance to determine safe d/c needs. Pt seen with PT to increase safety, decrease fall risk, and maximize functional/occupational performance 2* medical complexity which is a regression from pt's functional baseline. Pt lives alone in a 3  with 2 AYESHA. PTA, pt was independent in ADLs, has some assistance with IADLs, and uses rollator vs SPC for functional mobility. (+) driving. Currently, pt performing bed mobility @ supervision level, functional transfers w/ close supervision and RW, functional mobility w/ CGA w/ RW, UB ADLs @ supervision level, and LB ADLs w/ Min A. Pt demonstrates the following limitations/impairments which impact the pt's ability to engage in valued occupations: spinal precautions, balance, endurance/activity tolerance, standing tolerance, functional reach, postural/trunk control, strength, safety awareness, and pain. The patient's raw score on the -PAC Daily Activity Inpatient Short Form is 20. A raw score of greater than or equal to 19 " suggests the patient may benefit from discharge to home however please refer to the recommendation of the Occupational Therapist for safe discharge planning. From an OT standpoint, recommend discharge w/ Level 1 vs 3 resources pending continued functional progress, once medically stable. Pt would benefit from skilled OT services 2-3x/wk to address acute care needs and underlying performance skills to promote safety, decrease fall risk, and enhance occupational performance to return to PLOF. Goals to be met within the next 10-14 days.     Rehab Resource Intensity Level, OT: I (Maximum Resource Intensity) (vs Level 3 pending continued functional progress)

## 2025-05-02 NOTE — ASSESSMENT & PLAN NOTE
Lab Results   Component Value Date    HGBA1C 5.9 (H) 03/28/2025       Recent Labs     05/01/25  1006 05/01/25  1358 05/01/25  1703 05/01/25 2026   POCGLU 132 195* 170* 263*       Blood Sugar Average: Last 72 hrs:  (P) 190  A1c at goal   Home regimen: jardiance, ozempic   Hold oral regimen while hospitalized  Sliding scale insulin   CCD  Continue to monitor and adjust regimen as needed

## 2025-05-02 NOTE — OCCUPATIONAL THERAPY NOTE
Occupational Therapy Evaluation and Treatment     Patient Name: Vesna Langston  Today's Date: 5/2/2025  Problem List  Principal Problem:    Status post lumbar spinal fusion  Active Problems:    Atrial flutter, paroxysmal (HCC)    HFrEF (heart failure with reduced ejection fraction) ([facility])    Coronary artery disease involving native coronary artery of native heart    Type 2 diabetes mellitus, without long-term current use of insulin (HCC)    Acquired hypothyroidism    Class 1 obesity due to excess calories with serious comorbidity and body mass index (BMI) of 34.0 to 34.9 in adult    Other pulmonary embolism without acute cor pulmonale, unspecified chronicity ([facility])    Leukocytosis    Anemia    Past Medical History  Past Medical History:   Diagnosis Date    Acute respiratory insufficiency 03/22/2024    Allergic     Anxiety 4/24    Arrhythmia 3/22/24    Atrial fibrillation ([facility]) 3/22/24    Back pain 2022    Bradycardia 3/22/24    Breast cyst 2000    Chronic HFrEF (heart failure with reduced ejection fraction) ([facility])     Clotting disorder ([facility]) 4/1/24    Coronary artery disease     COVID-19 virus infection 11/28/2022    Depression 4/24    Diabetes mellitus ([facility])     Disease of thyroid gland 4/09/2024    Environmental and seasonal allergies 1/1/1960    GERD (gastroesophageal reflux disease) 10/09    GI (gastrointestinal bleed) 4/1/24    Heart disease 3/24    History of placement of internal cardiac defibrillator 3/27/24    Hyperlipidemia     Hypoglycemia     ICD (implantable cardioverter-defibrillator) in place     Ischemic cardiomyopathy     Lactic acidosis 03/22/2024    Migraine 1980    Morning headache 1980    Myocardial infarction ([facility]) 3/22/2024    Nausea & vomiting 04/03/2025    Otitis media 1966    Pacemaker 3/27/24    Peptic ulceration 4/2024    Seasonal allergies     Sinus problem 1970    ST elevation myocardial infarction involving left anterior descending (LAD) coronary artery ([facility]) 03/22/2024    Tobacco abuse      Visual impairment 1967     Past Surgical History  Past Surgical History:   Procedure Laterality Date    ADENOIDECTOMY      APPENDECTOMY      APPENDECTOMY LAPAROSCOPIC N/A 2024    Procedure: APPENDECTOMY LAPAROSCOPIC;  Surgeon: Lauryn Ullrich, DO;  Location: [facility];  Service: General    BACK SURGERY      BREAST EXCISIONAL BIOPSY Right 2000    cyst removed- benign    CARDIAC CATHETERIZATION N/A 2024    Procedure: Cardiac pci;  Surgeon: Gabriel Myers MD;  [facility];  Service: Cardiology    CARDIAC CATHETERIZATION N/A 2024    Procedure: Cardiac Coronary Angiogram;  Surgeon: Gabriel Myers MD;  [facility];  Service: Cardiology    CARDIAC CATHETERIZATION N/A 2024    Procedure: Cardiac PCI AMI;  Surgeon: Gabriel Myers MD;  [facility];  Service: Cardiology    CARDIAC CATHETERIZATION N/A 2024    Procedure: Cardiac IVUS;  Surgeon: Gabriel Myers MD;  [facility];  Service: Cardiology    CARDIAC DEFIBRILLATOR PLACEMENT      CARDIAC ELECTROPHYSIOLOGY PROCEDURE N/A 2024    Procedure: Cardiac icd implant;  Surgeon: Jeffy Lama MD;  [facility];  Service: Cardiology     SECTION      COLONOSCOPY      ECTOPIC PREGNANCY SURGERY      INSERT / REPLACE / REMOVE PACEMAKER      LUMBAR FUSION Bilateral 2025    Procedure: Navigated revision L3-S1 laminectomy/decompression, revision L3-S1 posterior instrumented spinal fusion with TLIF;  Surgeon: Charles Mcrae MD;  [facility];  Service: Orthopedics    MASS EXCISION Left 10/18/2024    Procedure: EXCISION BIOPSY TISSUE LESION/MASS UPPER EXTREMITY - Left index finger;  Surgeon: Leandro Campos MD;  Location: [facility];  Service: Orthopedics    SEPTOPLASTY      SPINE SURGERY  23    TONSILLECTOMY      TONSILLECTOMY AND ADENOIDECTOMY  1964    TRIGGER POINT INJECTION      UPPER GASTROINTESTINAL ENDOSCOPY  24  0848   OT Last Visit   OT Visit Date 05/02/25   Note Type   Note type Evaluation  (+ additional OT treatment)   Additional Comments Pt seen w/ PT to increase safety, decrease fall risk, and maximize functional/occupational performance 2* medical complexity which is a regression from pt's functional baseline.   Pain Assessment   Pain Assessment Tool 0-10   Pain Score 3   Pain Location/Orientation Location: Back   Effect of Pain on Daily Activities Impacts ability to engage in valued occupations   Hospital Pain Intervention(s) Repositioned;Ambulation/increased activity;Emotional support   Restrictions/Precautions   Weight Bearing Precautions Per Order Yes   RLE Weight Bearing Per Order WBAT   LLE Weight Bearing Per Order WBAT   Braces or Orthoses (S)  LSO   Other Precautions Chair Alarm;Bed Alarm;WBS;Multiple lines;Fall Risk;Pain;Spinal precautions   Home Living   Type of Home House  (3 SH with 2 AYESHA in front with railings and 2 AYESHA in back)   Home Layout Multi-level;Bed/bath upstairs;Performs ADLs on one level;Access   Bathroom Shower/Tub Walk-in shower  (Pt reporting full bathroom on the 1st floor)   Bathroom Toilet Raised   Bathroom Equipment Grab bars in shower;Built-in shower seat   Bathroom Accessibility Accessible   Home Equipment Walker;Cane;Grab bars;Other (Comment)  (rollator)   Additional Comments Pt reports that she lives alone in a 3 SH with 2 AYESHA in front vs 2 AYESHA in back, reporting that she has a full bathroom on the first floor; reports that she is planning on staying with her daughter upon d/c; has SPC, 2 RWs, and rollator @ home in which she uses rollator and SPC interchangably for functional mobility   Prior Function   Level of [place] Independent with ADLs;Independent with functional mobility;Needs assistance with IADLS   Lives With (S)  Alone   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;Personal care attendant   IADLs Independent with driving;Independent with meal prep;Independent with medication management  "  Falls in the last 6 months (S)  >10  (Pt reporting 12 falls in which her legs give out)   Vocational Workers comp   Comments (+) driving; reporting that she has a  that comes every Friday to assist with IADLs   Lifestyle   Autonomy PTA, pt was independent in ADLs, has some assistance with IADLs, and uses rollator vs SPC for functional mobility; (+) driving   Reciprocal Relationships Lives alone; supportive daughter and sons however all live approx 1 hour away   Service to Others Reports that she was injured at [employer] where she works   Intrinsic Gratification Enjoys reading and crotcheting   Subjective   Subjective \"This has been ongoing.\"   ADL   Where Assessed Standing at sink   Eating Assistance 5  Supervision/Setup   Grooming Assistance 5  Supervision/Setup   UB Bathing Assistance 5  Supervision/Setup   LB Bathing Assistance 4  Minimal Assistance   UB Dressing Assistance 5  Supervision/Setup   LB Dressing Assistance 4  Minimal Assistance   Toileting Assistance  5  Supervision/Setup   Functional Assistance 5  Supervision/Setup   Bed Mobility   Supine to Sit 5  Supervision   Additional items HOB elevated;Bedrails;Increased time required;Verbal cues   Sit to Supine Unable to assess   Additional Comments Pt demonstrating good carryover of spinal precautions throughout bed mobility tasks; @ end of session, pt left sitting upright in recliner with all functional needs in reach with chair alarm activated   Transfers   Sit to Stand 5  Supervision   Additional items Increased time required;Verbal cues   Stand to Sit 5  Supervision   Additional items Increased time required;Verbal cues   Stand pivot 5  Supervision   Additional items Increased time required;Verbal cues   Toilet transfer 5  Supervision   Additional items Increased time required;Verbal cues;Standard toilet   Additional Comments w/ RW   Functional Mobility   Functional Mobility 4  Minimal assistance  (CGA)   Additional Comments Pt completed " "household functional mobility distances <> bathroom + additional household functional mobility distances to simulate ADL/IADL routines w/ varying CGA w/ RW for safety and support   Additional items Rolling walker   Balance   Static Sitting Good   Dynamic Sitting Fair +   Static Standing Fair   Dynamic Standing Fair   Ambulatory Fair -   Activity Tolerance   Activity Tolerance Patient tolerated treatment well;Patient limited by fatigue   Medical Staff Made Aware BRIAN Stephenson   Nurse Made Aware RN cleared   RUE Assessment   RUE Assessment WFL   LUE Assessment   LUE Assessment WFL   Hand Function   Gross Motor Coordination Functional   Fine Motor Coordination Functional   Cognition   Overall Cognitive Status WFL   Arousal/Participation Alert;Responsive;Arousable;Cooperative   Attention Within functional limits   Orientation Level Oriented X4   Memory Within functional limits   Following Commands Follows all commands and directions without difficulty   Comments Pt pleasant and cooperative; good safety awareness and carryover of spinal precautions   Assessment   Limitation Decreased ADL status;Decreased endurance;Decreased self-care trans;Decreased high-level ADLs   Prognosis Fair   Assessment Pt is a 66 yo female admitted to [facility] s/p \"Bilateral Navigated revision L3-S1 laminectomy/decompression, revision L3-S1 posterior instrumented spinal fusion with TLIF\" on 5/2 and is WBAT in b/l LE and has LSO when OOB per ortho. Pt with active OT orders in which OT consulted to assess pt's functional status and occupational performance to determine safe d/c needs. Pt seen with PT to increase safety, decrease fall risk, and maximize functional/occupational performance 2* medical complexity which is a regression from pt's functional baseline. Pt lives alone in a 3  with 2 AYESHA. PTA, pt was independent in ADLs, has some assistance with IADLs, and uses rollator vs SPC for functional mobility. (+) driving. Currently, pt performing bed " mobility @ supervision level, functional transfers w/ close supervision and RW, functional mobility w/ CGA w/ RW, UB ADLs @ supervision level, and LB ADLs w/ Min A. Pt demonstrates the following limitations/impairments which impact the pt's ability to engage in valued occupations: spinal precautions, balance, endurance/activity tolerance, standing tolerance, functional reach, postural/trunk control, strength, safety awareness, and pain. The patient's raw score on the AM-PAC Daily Activity Inpatient Short Form is 20. A raw score of greater than or equal to 19 suggests the patient may benefit from discharge to home however please refer to the recommendation of the Occupational Therapist for safe discharge planning. From an OT standpoint, recommend discharge w/ Level 1 vs 3 resources pending continued functional progress, once medically stable. Pt would benefit from skilled OT services 2-3x/wk to address acute care needs and underlying performance skills to promote safety, decrease fall risk, and enhance occupational performance to return to PLOF. Goals to be met within the next 10-14 days.   Goals   Patient Goals To regain independence   LTG Time Frame 10-14   Long Term Goal #1 See OT goals listed below   Plan   Treatment Interventions ADL retraining;Endurance training;Patient/family training;Equipment evaluation/education;Compensatory technique education;Continued evaluation;Energy conservation;Activityengagement   Goal Expiration Date 05/16/25   OT Frequency 2-3x/wk   Discharge Recommendation   Rehab Resource Intensity Level, OT I (Maximum Resource Intensity)  (vs Level 3 pending continued functional progress)   AM-PAC Daily Activity Inpatient   Lower Body Dressing 3   Bathing 3   Toileting 3   Upper Body Dressing 3   Grooming 4   Eating 4   Daily Activity Raw Score 20   Daily Activity Standardized Score (Calc for Raw Score >=11) 42.03   AM-PAC Applied Cognition Inpatient   Following a Speech/Presentation 4    Understanding Ordinary Conversation 4   Taking Medications 4   Remembering Where Things Are Placed or Put Away 4   Remembering List of 4-5 Errands 4   Taking Care of Complicated Tasks 4   Applied Cognition Raw Score 24   Applied Cognition Standardized Score 62.21   Additional Treatment Session   Start Time 0822   End Time 0848   Treatment Assessment Pt seen for additional OT treatment on this date in which treatment focused to improve functional transfers with fall prevention strategies, static/dynamic balance, postural/trunk control, proper body mechanics, functional use of b/l UE's, higher level cognitive functions, safety awareness, and overall increased activity tolerance in ADL/IADL/leisure tasks. Pt performed toilet transfer w/ close supervision w/ RW and grab bar use and was able to completed toileting hygiene tasks w/ supervision in standing position, requiring Min A to pull underwear up over hips 2* decreased functional reach, trunk rotation, and dynamic standing balance. Pt stood sinkside for approx 2-3 minutes to complete grooming tasks including oral care hygiene routine and hand washing. At the end of the session, pt was left sitting upright in recliner with all functional needs in reach. Pt demonstrates gradual functional improvements towards OT goals however continues to be functioning below occupational baseline and is still limited by the following limitations/impairments which were addressed through skilled instruction: spinal precautions, generalized weakness, balance, endurance/activity tolerance, postural/trunk control, strength, pain, and safety awareness. At this time, recommend discharge w/ Level 1 vs Level 3 resources when medically stable. Please refer to the recommendation of the Occupational Therapist for safe discharge planning.  Established OT goals will be continued 2-3x/wk to address acute care needs and underlying performance skills to maximize occupational performance and safety  to return to PLOF.   Additional Treatment Day 1   End of Consult   Education Provided Yes   Patient Position at End of Consult Bedside chair;Bed/Chair alarm activated;All needs within reach   Nurse Communication Nurse aware of consult     OT GOALS:    Pt will improve functional mobility during ADL/IADL/leisure tasks with Mod I using AE/DME prn.    Pt will improve activity tolerance/functional endurance during ADL/IADL/leisure tasks for at least 20 minutes to improve occupational performance and engagement in valued occupations using AE/DME prn.    Pt will engage in ongoing functional/formal cognitive assessments to assist with safe d/c planning and increase safety during functional tasks.    Pt will participate in simulated IADL management task w/ Mod I to increase independence and engagement in valued occupations w/ good balance/safety.    Pt will improve dynamic standing balance for at least 15 minutes with Mod I during functional tasks to decrease fall risk and improve independence and engagement in ADL/IADL/leisure activities.    Pt will follow 100% of multi-step commands in ADL/IADL/leisure activities to improve functional cognition used in functional daily routines.    Pt will complete functional transfers on and off all surfaces used in daily routines with Mod I for safety to maximize functional/occupational performance.    Pt will complete all bed mobility tasks with Mod I to serve as a prerequisite for EOB/OOB ADL/IADL/leisure tasks, optimize positioning/comfort, and increase functional independence.    Pt will independently demonstrate good carryover of safety precautions, spinal precautions, and education/training during ADL/IADL/leisure tasks with energy conservation techniques s/p skilled instruction without verbal cues.    Pt will complete UB ADL tasks with Mod I using AE/DME prn to increase functional independence in ADL/IADL/leisure tasks.    Pt will complete LB ADL tasks with Mod I using AE/DME prn  to increase functional independence in ADL/IADL/leisure tasks.     Pt will complete toileting tasks with Mod I and good hygiene/thoroughness using AE/DME prn to increase functional independence.    Pt will independently identify and utilize 2-3 positive coping strategies to enhance overall wellbeing and engagement in valued occupations.    Colleen Lang MS, OTR/L

## 2025-05-02 NOTE — PROGRESS NOTES
[address] [place] [address]                                OPERATIVE REPORT    PATIENT NAME: Leno Turner                  :        1987  MED REC NO:   [id number]                           ROOM:  ACCOUNT NO:   [de-identified]                           ADMIT DATE: 2021  PROVIDER:     Maylin Terry M.D.    Jamie Rojass:  2021    PREOPERATIVE DIAGNOSIS:  Large simple left ovarian cyst.    POSTOPERATIVE DIAGNOSES:  Large simple left ovarian cyst with addition  of left ovarian torsion times one turn. PROCEDURES:  Operative laparoscopy with left salpingo-oophorectomy. SURGEON:  Lei Thomas MD    ANESTHESIA:  General.    ESTIMATED BLOOD LOSS:  Approximately 10 mL. PATHOLOGY SPECIMENS:  Left tube and ovary. DESCRIPTION OF PROCEDURE:  The patient was taken to the operating room  at which time she underwent general anesthetic. She was then placed in  the dorsal lithotomy position, sterilely prepped and draped in the usual  manner. A weighted speculum was placed in the vagina. The anterior lip  of the cervix was grasped with a Hulka tenaculum. Attention was turned  to the abdomen. A scalpel was then used to make an infraumbilical stab  incision. This was elevated and the Veress needle introduced. Proper  positioning was confirmed by the drop test.  Once approximately 2.5  liters of CO2 was instilled, the Veress needle was removed and the  trocar placed. Proper positioning was reconfirmed by the camera. The  remaining laparoscopic ports were then placed. A 5 mm in left lower  quadrant and a 10 mm suprapubically. The large ovary on the left was  noted. This was noted to have one twist index. The ovary was then  grasped near the pole and the infundibulopelvic ligament was inspected  and was found to be remote from the ureter.   The infundibulopelvic  ligament on the left was then thoroughly Progress Note - Hospitalist   Name: Vesna Langston 65 y.o. female I MRN: [id number]  Unit/Bed#: -01 I Date of Admission: 5/1/2025   Date of Service: 5/2/2025 I Hospital Day: 1    Assessment & Plan  Status post lumbar spinal fusion  Patient presented with back pain status post bilateral navigated revision L3-S1 laminectomy/decompression, revision L3-S1 posterior instrumented spinal fusion with TLIF on 5/1/2025  Management per orthopedic surgery  WBAT BLLE  PT/OT eval  LSO brace OOB  Coronary artery disease involving native coronary artery of native heart  S/p stent in 3/2024  Continue Toprol-XL 50mg daily, imdur 30mg BID, plavix, statin  Plavix on hold until clearance per ortho  Atrial flutter, paroxysmal (HCC)  Continue Toprol 50mg qhs, tikosyn 125mcg BID  Eliquis on hold -- resume when cleared by primary team  HFrEF (heart failure with reduced ejection fraction) ([facility])  Wt Readings from Last 3 Encounters:   05/01/25 94.3 kg (208 lb)   04/24/25 94.3 kg (208 lb)   04/23/25 94.8 kg (209 lb)     Appears well compensated  Home regimen: jardiance 25mg daily, entresto, PRN lasix   Monitor volume status postoperatively  Continue GDMT as per home regimen - entresto and jardiance on hold, can resume on discharge  Only uses Lasix as needed  Acquired hypothyroidism  Continue levothyroxine  Class 1 obesity due to excess calories with serious comorbidity and body mass index (BMI) of 34.0 to 34.9 in adult  Encourage lifestyle changes  Other pulmonary embolism without acute cor pulmonale, unspecified chronicity (HCC)  Resume Eliquis once cleared by surgery service  Type 2 diabetes mellitus, without long-term current use of insulin (HCC)  Lab Results   Component Value Date    HGBA1C 5.9 (H) 03/28/2025       Recent Labs     05/01/25  1006 05/01/25  1358 05/01/25  1703 05/01/25 2026   POCGLU 132 195* 170* 263*       Blood Sugar Average: Last 72 hrs:  (P) 190  A1c at goal   Home regimen: jardiance, ozempic   Hold oral regimen while  cauterized and transected. The  mesosalpinx was then followed all the way to the cornua and the  ovarian-suspensory ligament was then thoroughly cauterized and  transected fraying the adnexa. All surgical sites were inspected and  found to be hemostatic. The large Endo Catch bag was then placed and  deployed. The ovary was placed in this, but was unable to be closed  secondary to appearance of the ovary. A needle was then placed into the  field under direct visualization and the ovary was then drained of 40 mL  of clear straw-like fluid. Once this was accomplished, the bag was then  brought up to the skin and this was removed through the suprapubic port. The fascia and the 10-mm port then had one stitch of 0 chromic placed  for fascial reapproximation. The gas was allowed to escape. Cameras  and ports removed together. The incision was then closed with buried  interrupted 4-0 Vicryl. I had Marcaine injected into each as well as  Steri-Strips and bandage applied. The patient was then undraped after  Hulka tenaculum was removed. She was then cleaned of any Betadine,  taken out of lithotomy position, transported to the cart, and taken to  the recovery room in stable condition.         Ryne Duarte M.D.    D: 11/18/2021 14:00:07       T: 11/18/2021 19:03:14     [email or web address]  Job#: [id number]     Doc#: [id number]    CC: hospitalized  Sliding scale insulin   CCD  Continue to monitor and adjust regimen as needed  Leukocytosis  WBC 15.5 on am labs  Likely reactive in the setting of recent procedure  Remains afebrile and without additional s/sx of infection   Continue to monitor on CBC daily   Anemia  Hgb 13.5 (4/10) --> 10.3 on am labs this morning  Likely ABLA in the setting of recent surgery  Continue monitoring DARWIN drain output   Repeat CBC in the am     VTE Pharmacologic Prophylaxis:   Moderate Risk (Score 3-4) - Pharmacological DVT Prophylaxis Ordered: heparin.    Mobility:   Basic Mobility Inpatient Raw Score: 16  -HLM Goal: 5: Stand one or more mins  JH-HLM Achieved: 4: Move to chair/commode  JH-HLM Goal NOT achieved. Continue with multidisciplinary rounding and encourage appropriate mobility to improve upon -HL goals.    Patient Centered Rounds: I performed bedside rounds with nursing staff today.   Discussions with Specialists or Other Care Team Provider: Ortho    Education and Discussions with Family / Patient:  per primary.     Current Length of Stay: 1 day(s)  Current Patient Status: Inpatient   Certification Statement: The patient will continue to require additional inpatient hospital stay due to continued drain output monitoring, pain control, PT/OT eval  Discharge Plan: [facility] is following this patient on consult. They are not yet medically stable for discharge secondary to pain control, PT/OT eval, hgb monitoring.    Code Status: Prior    Subjective   Seen and examined.  No acute events overnight.  Patient reports feeling well this morning.  She states that she did work with physical therapy earlier and is starting to have some pain or discomfort but states she is due for pain medication soon.  She is eating and drinking without difficulty.  Urinating without issues.  Has not had a bowel movement yet but continues to pass gas.  She offers no acute complaints at this time.    Objective :  Temp:  [97.3 °F (36.3  °C)-97.8 °F (36.6 °C)] 97.6 °F (36.4 °C)  HR:  [70-93] 70  BP: (107-135)/(60-79) 120/65  Resp:  [13-27] 16  SpO2:  [92 %-100 %] 95 %  O2 Device: None (Room air)  Nasal Cannula O2 Flow Rate (L/min):  [3 L/min] 3 L/min    Body mass index is 31.63 kg/m².     Input and Output Summary (last 24 hours):     Intake/Output Summary (Last 24 hours) at 5/2/2025 0942  Last data filed at 5/2/2025 0640  Gross per 24 hour   Intake 2950 ml   Output 3350 ml   Net -400 ml       Physical Exam  Constitutional:       General: She is not in acute distress.     Appearance: She is not toxic-appearing.   HENT:      Head: Normocephalic and atraumatic.      Nose: Nose normal.      Mouth/Throat:      Mouth: Mucous membranes are moist.   Eyes:      Conjunctiva/sclera: Conjunctivae normal.   Cardiovascular:      Rate and Rhythm: Normal rate.      Heart sounds: Normal heart sounds. No murmur heard.     No friction rub. No gallop.   Pulmonary:      Effort: Pulmonary effort is normal.      Breath sounds: No wheezing, rhonchi or rales.   Abdominal:      Palpations: Abdomen is soft.   Musculoskeletal:      Cervical back: Normal range of motion.      Right lower leg: No edema.      Left lower leg: No edema.   Skin:     General: Skin is warm.   Neurological:      Mental Status: Mental status is at baseline.      Motor: Weakness (RLE>LLE reports baseline 2/2 prior nerve damage) present.      Comments: LSO in place   Psychiatric:         Mood and Affect: Mood normal.         Lines/Drains:  Lines/Drains/Airways       Active Status       Name Placement date Placement time Site Days    Closed/Suction Drain Left Back Bulb 19 Fr. 05/01/25  1615  Back  less than 1                            Lab Results: I have reviewed the following results:   Results from last 7 days   Lab Units 05/02/25  0521   WBC Thousand/uL 15.55*   HEMOGLOBIN g/dL 10.3*   HEMATOCRIT % 33.4*   PLATELETS Thousands/uL 259   SEGS PCT % 82*   LYMPHO PCT % 10*   MONO PCT % 7   EOS PCT % 0      Results from last 7 days   Lab Units 05/02/25  0521   SODIUM mmol/L 137   POTASSIUM mmol/L 4.6   CHLORIDE mmol/L 108   CO2 mmol/L 19*   BUN mg/dL 12   CREATININE mg/dL 0.69   ANION GAP mmol/L 10   CALCIUM mg/dL 8.5   GLUCOSE RANDOM mg/dL 141*         Results from last 7 days   Lab Units 05/01/25  2026 05/01/25  1703 05/01/25  1358 05/01/25  1006   POC GLUCOSE mg/dl 263* 170* 195* 132               Recent Cultures (last 7 days):         Imaging Results Review: No pertinent imaging studies reviewed.  Other Study Results Review: No additional pertinent studies reviewed.    Last 24 Hours Medication List:     Current Facility-Administered Medications:     acetaminophen (TYLENOL) tablet 650 mg, Q6H MONA    aluminum-magnesium hydroxide-simethicone (MAALOX) oral suspension 30 mL, Q6H PRN    atorvastatin (LIPITOR) tablet 80 mg, QPM    calcium carbonate (TUMS) chewable tablet 1,000 mg, Daily PRN    docusate sodium (COLACE) capsule 100 mg, BID    dofetilide (TIKOSYN) capsule 125 mcg, Q12H MONA    fluticasone (FLONASE) 50 mcg/act nasal spray 1 spray, Daily    heparin (porcine) subcutaneous injection 5,000 Units, Q12H MONA    insulin lispro (HumALOG/ADMELOG) 100 units/mL subcutaneous injection 1-5 Units, HS    insulin lispro (HumALOG/ADMELOG) 100 units/mL subcutaneous injection 1-6 Units, TID AC **AND** Fingerstick Glucose (POCT), TID AC    isosorbide mononitrate (IMDUR) 24 hr tablet 30 mg, BID    lactated ringers infusion, Continuous, Last Rate: 125 mL/hr (05/01/25 1843)    levothyroxine tablet 75 mcg, Daily    [Held by provider] methocarbamol (ROBAXIN) tablet 500 mg, Q6H MONA    metoprolol succinate (TOPROL-XL) 24 hr tablet 50 mg, HS    ondansetron (ZOFRAN) injection 4 mg, Q6H PRN    oxyCODONE (ROXICODONE) IR tablet 10 mg, Q4H PRN    oxyCODONE (ROXICODONE) IR tablet 5 mg, Q4H PRN    pantoprazole (PROTONIX) EC tablet 40 mg, BID AC    pregabalin (LYRICA) capsule 75 mg, TID    senna (SENOKOT) tablet 8.6 mg,  Daily    Administrative Statements   Today, Patient Was Seen By: Fiorella Matthews PA-C    **Please Note: This note may have been constructed using a voice recognition system.**

## 2025-05-02 NOTE — UTILIZATION REVIEW
Initial Clinical Review    Elective Inpatient surgical procedure  Age/Sex: 65 y.o. female  Surgery Date: 5/1  Procedure: S/p   Exploration of fusion mass, L3-S1: 22830  Removal of hardware, L3-S1: 20680  Arthrodesis, L5-S1 TLIF: 22633  Laminotomy/facetectomy, L5-S1: 96098   Arthrodesis, T4-5 PL: 22614   Arthrodesis, T3-4 PL: 22614   Posterior segmental instrumentation/pedicle screw fixation,L3-S1: 38676  3D computer-assisted navigation: 97605  Anesthesia: General    POD#1 Progress Note:   5/2  DARWIN drain in place with ss outpt. Monitor drain outpt. Iv antibiotics. Lumbar spine precautions. Must wear LSO brace when out of bed   Monitor for acute blood loss anemia and administer or recommend IVF/prbc as indicated for greater than 2 gram Hgb drop or Hgb < 7   Incentive spirometry. Pain control.    Admission Orders: Date/Time/Statement:   Admission Orders (From admission, onward)       Ordered        05/01/25 1615  Inpatient Admission  Once                          Orders Placed This Encounter   Procedures    Inpatient Admission     Standing Status:   Standing     Number of Occurrences:   1     Level of Care:   Med Surg [16]     Estimated length of stay:   Not Applicable     Diet: Regular  Mobility: OOB with Brace  DVT Prophylaxis: SCD    Medications/Pain Control:   Scheduled Medications:  acetaminophen, 650 mg, Oral, Q6H MONA  atorvastatin, 80 mg, Oral, QPM  docusate sodium, 100 mg, Oral, BID  dofetilide, 125 mcg, Oral, Q12H MONA  fluticasone, 1 spray, Nasal, Daily  heparin (porcine), 5,000 Units, Subcutaneous, Q12H MONA  insulin lispro, 1-5 Units, Subcutaneous, HS  insulin lispro, 1-6 Units, Subcutaneous, TID AC  isosorbide mononitrate, 30 mg, Oral, BID  levothyroxine, 75 mcg, Oral, Daily  [Held by provider] methocarbamol, 500 mg, Oral, Q6H MONA  metoprolol succinate, 50 mg, Oral, HS  pantoprazole, 40 mg, Oral, BID AC  pregabalin, 75 mg, Oral, TID  senna, 1 tablet, Oral, Daily    ceFAZolin (ANCEF) IVPB (premix in dextrose)  2,000 mg 50 mL  Dose: 2,000 mg  Freq: Every 8 hours Route: IV  Last Dose: Stopped (05/02/25 0814)  Start: 05/01/25 2330 End: 05/02/25 0814    ceFAZolin (ANCEF) IVPB (premix in dextrose) 2,000 mg 50 mL  Dose: 2,000 mg  Freq: Once Route: IV  Indications of Use: PROPHYLAXIS  Start: 05/01/25 0915 End: 05/01/25 1523    Continuous IV Infusions:  lactated ringers, 125 mL/hr, Intravenous, Continuous      PRN Meds:  aluminum-magnesium hydroxide-simethicone, 30 mL, Oral, Q6H PRN  calcium carbonate, 1,000 mg, Oral, Daily PRN  ondansetron, 4 mg, Intravenous, Q6H PRN  oxyCODONE, 10 mg, Oral, Q4H PRN 5/1 x1, 5/2 x1  oxyCODONE, 5 mg, Oral, Q4H PRN 5/1 x1      Vital Signs (last 3 days)       Date/Time Temp Pulse Resp BP MAP (mmHg) SpO2 Calculated FIO2 (%) - Nasal Cannula O2 Flow Rate (L/min) Nasal Cannula O2 Flow Rate (L/min) O2 Device Bryan Coma Scale Score Pain    05/02/25 0756 -- -- -- -- -- -- -- -- -- None (Room air) 15 6    05/02/25 07:50:41 97.6 °F (36.4 °C) 70 16 120/65 83 95 % -- -- -- -- -- --    05/02/25 06:20:31 97.6 °F (36.4 °C) 70 -- 122/65 84 95 % -- -- -- -- -- --    05/02/25 0439 -- -- -- -- -- -- -- -- -- -- -- 7 05/02/25 0100 -- -- -- -- -- -- -- -- -- None (Room air) 15 --    05/01/25 2302 -- -- -- -- -- -- -- -- -- -- -- 7 05/01/25 21:37:40 -- 82 -- 125/71 89 93 % -- -- -- -- -- --    05/01/25 20:27:47 97.5 °F (36.4 °C) 90 17 130/70 90 92 % -- -- -- -- -- --    05/01/25 2005 -- -- -- -- -- 93 % -- -- -- -- -- --    05/01/25 19:42:15 97.8 °F (36.6 °C) 93 17 132/71 91 94 % -- -- -- -- -- --    05/01/25 1848 -- -- -- -- -- -- -- -- -- -- -- 6 05/01/25 1838 -- -- -- -- -- -- -- -- -- -- -- 5 05/01/25 18:29:36 97.3 °F (36.3 °C) 84 16 127/69 88 95 % -- -- -- -- -- --    05/01/25 1829 -- -- -- -- -- -- -- -- -- -- 15 --    05/01/25 1800 -- 80 20 124/63 85 99 % 32 -- 3 L/min Nasal cannula 15 5    05/01/25 1750 -- 78 13 135/64 -- 99 % -- -- -- -- -- 7 05/01/25 1745 97.7 °F (36.5 °C) 81 22 135/64 92 100  % 32 -- 3 L/min Nasal cannula 15 8    05/01/25 1740 -- -- -- -- -- -- -- -- -- -- -- 8    05/01/25 1733 -- -- -- -- -- -- -- -- -- -- -- 10 - Worst Possible Pain    05/01/25 1732 -- 77 23 117/63 84 100 % -- -- -- -- -- --    05/01/25 1730 -- 79 16 117/63 -- -- -- -- -- -- -- 10 - Worst Possible Pain    05/01/25 1727 -- 82 27 111/79 92 100 % -- -- -- -- -- 10 - Worst Possible Pain    05/01/25 1715 -- 75 25 111/79 92 98 % -- -- -- -- 15 --    05/01/25 1709 -- -- -- -- -- -- -- -- -- -- -- 10 - Worst Possible Pain    05/01/25 1653 97.4 °F (36.3 °C) 81 16 107/60 77 100 % -- 6 L/min -- Simple mask 15 --    05/01/25 0934 -- -- -- -- -- -- -- -- -- -- -- 6    05/01/25 0855 96.8 °F (36 °C) 73 18 131/84 -- 98 % -- -- -- None (Room air) -- --          Weight (last 2 days)       Date/Time Weight    05/01/25 1848 94.3 (208)    05/01/25 0934 94.3 (208)            Pertinent Labs/Diagnostic Test Results:   Radiology:  XR spine lumbar 2 or 3 views injury    (Results Pending)     Cardiology:  No orders to display     GI:  No orders to display           Results from last 7 days   Lab Units 05/02/25  0521   WBC Thousand/uL 15.55*   HEMOGLOBIN g/dL 10.3*   HEMATOCRIT % 33.4*   PLATELETS Thousands/uL 259   TOTAL NEUT ABS Thousands/µL 12.85*         Results from last 7 days   Lab Units 05/02/25  0521   SODIUM mmol/L 137   POTASSIUM mmol/L 4.6   CHLORIDE mmol/L 108   CO2 mmol/L 19*   ANION GAP mmol/L 10   BUN mg/dL 12   CREATININE mg/dL 0.69   EGFR ml/min/1.73sq m 90   CALCIUM mg/dL 8.5         Results from last 7 days   Lab Units 05/01/25  2026 05/01/25  1703 05/01/25  1358 05/01/25  1006   POC GLUCOSE mg/dl 263* 170* 195* 132     Results from last 7 days   Lab Units 05/02/25  0521   GLUCOSE RANDOM mg/dL 141*         Network Utilization Review Department  ATTENTION: Please call with any questions or concerns to [phone] and carefully listen to the prompts so that you are directed to the right person. All voicemails are confidential.    For Discharge needs, contact Care Management DC Support Team at [phone] opt. [phone]  Send all requests for admission clinical reviews, approved or denied determinations and any other requests to dedicated fax number below belonging to the campus where the patient is receiving treatment. List of dedicated fax numbers for the Facilities:  FACILITY NAME UR FAX NUMBER   ADMISSION DENIALS (Administrative/Medical Necessity) [phone]   DISCHARGE SUPPORT TEAM (NETWORK) [phone]   PARENT CHILD HEALTH (Maternity/NICU/Pediatrics) [phone]   [facility] [phone]   [facility] [phone]   [facility] [phone]   [facility] [phone]   [facility] [phone]   [facility] [phone]   [facility] [phone]   [facility] [phone]   [facility] [phone]   [facility] [phone]   [facility] [phone]   [facility] [phone]

## 2025-05-02 NOTE — RESTORATIVE TECHNICIAN NOTE
Restorative Technician Note      Patient Name: Vesna Langston     Restorative Tech Visit Date: 05/02/25  Note Type: Bracing, Initial consult  Patient Position Upon Consult: Seated edge of bed  Brace Applied: [place] LSO (size XXL)  Additional Brace Ordered: No  Patient Position When Brace Applied: Seated  Education Provided: Yes (LSO handout given)  Patient Position at End of Consult: Seated edge of bed; All needs within reach; Other (comment); Supine (with PT/OT)  Nurse Communication: Nurse aware of consult, application of brace    Please contact [facility] PT Restorative tech on Epic Secure Chat  in regards to bracing instruction and/or adjustment.     MINH Steveo

## 2025-05-03 LAB
ANION GAP SERPL CALCULATED.3IONS-SCNC: 9 MMOL/L (ref 4–13)
BUN SERPL-MCNC: 11 MG/DL (ref 5–25)
CALCIUM SERPL-MCNC: 8.5 MG/DL (ref 8.4–10.2)
CHLORIDE SERPL-SCNC: 105 MMOL/L (ref 96–108)
CO2 SERPL-SCNC: 23 MMOL/L (ref 21–32)
CREAT SERPL-MCNC: 0.74 MG/DL (ref 0.6–1.3)
ERYTHROCYTE [DISTWIDTH] IN BLOOD BY AUTOMATED COUNT: 16.3 % (ref 11.6–15.1)
GFR SERPL CREATININE-BSD FRML MDRD: 84 ML/MIN/1.73SQ M
GLUCOSE SERPL-MCNC: 113 MG/DL (ref 65–140)
GLUCOSE SERPL-MCNC: 119 MG/DL (ref 65–140)
GLUCOSE SERPL-MCNC: 126 MG/DL (ref 65–140)
GLUCOSE SERPL-MCNC: 139 MG/DL (ref 65–140)
GLUCOSE SERPL-MCNC: 154 MG/DL (ref 65–140)
HCT VFR BLD AUTO: 30.2 % (ref 34.8–46.1)
HGB BLD-MCNC: 9.4 G/DL (ref 11.5–15.4)
MCH RBC QN AUTO: 26.6 PG (ref 26.8–34.3)
MCHC RBC AUTO-ENTMCNC: 31.1 G/DL (ref 31.4–37.4)
MCV RBC AUTO: 86 FL (ref 82–98)
PLATELET # BLD AUTO: 198 THOUSANDS/UL (ref 149–390)
PMV BLD AUTO: 9.7 FL (ref 8.9–12.7)
POTASSIUM SERPL-SCNC: 3.9 MMOL/L (ref 3.5–5.3)
RBC # BLD AUTO: 3.53 MILLION/UL (ref 3.81–5.12)
SODIUM SERPL-SCNC: 137 MMOL/L (ref 135–147)
WBC # BLD AUTO: 10.18 THOUSAND/UL (ref 4.31–10.16)

## 2025-05-03 PROCEDURE — 82948 REAGENT STRIP/BLOOD GLUCOSE: CPT

## 2025-05-03 PROCEDURE — NC001 PR NO CHARGE: Performed by: ORTHOPAEDIC SURGERY

## 2025-05-03 PROCEDURE — 80048 BASIC METABOLIC PNL TOTAL CA: CPT

## 2025-05-03 PROCEDURE — 99232 SBSQ HOSP IP/OBS MODERATE 35: CPT | Performed by: NURSE PRACTITIONER

## 2025-05-03 PROCEDURE — 85027 COMPLETE CBC AUTOMATED: CPT

## 2025-05-03 RX ORDER — LACTULOSE 10 G/15ML
30 SOLUTION ORAL DAILY PRN
Status: DISCONTINUED | OUTPATIENT
Start: 2025-05-03 | End: 2025-05-06 | Stop reason: HOSPADM

## 2025-05-03 RX ADMIN — OXYCODONE HYDROCHLORIDE 10 MG: 5 TABLET ORAL at 17:07

## 2025-05-03 RX ADMIN — PANTOPRAZOLE SODIUM 40 MG: 40 TABLET, DELAYED RELEASE ORAL at 05:41

## 2025-05-03 RX ADMIN — FLUTICASONE PROPIONATE 1 SPRAY: 50 SPRAY, METERED NASAL at 08:34

## 2025-05-03 RX ADMIN — HEPARIN SODIUM 5000 UNITS: 5000 INJECTION INTRAVENOUS; SUBCUTANEOUS at 21:23

## 2025-05-03 RX ADMIN — DOFETILIDE 125 MCG: 0.12 CAPSULE ORAL at 08:34

## 2025-05-03 RX ADMIN — DOFETILIDE 125 MCG: 0.12 CAPSULE ORAL at 21:22

## 2025-05-03 RX ADMIN — OXYCODONE HYDROCHLORIDE 10 MG: 5 TABLET ORAL at 10:30

## 2025-05-03 RX ADMIN — DOCUSATE SODIUM 100 MG: 100 CAPSULE, LIQUID FILLED ORAL at 08:34

## 2025-05-03 RX ADMIN — OXYCODONE HYDROCHLORIDE 5 MG: 5 TABLET ORAL at 21:22

## 2025-05-03 RX ADMIN — PREGABALIN 75 MG: 75 CAPSULE ORAL at 08:34

## 2025-05-03 RX ADMIN — PANTOPRAZOLE SODIUM 40 MG: 40 TABLET, DELAYED RELEASE ORAL at 17:02

## 2025-05-03 RX ADMIN — OXYCODONE HYDROCHLORIDE 5 MG: 5 TABLET ORAL at 06:32

## 2025-05-03 RX ADMIN — ATORVASTATIN CALCIUM 80 MG: 80 TABLET, FILM COATED ORAL at 17:02

## 2025-05-03 RX ADMIN — ACETAMINOPHEN 650 MG: 325 TABLET, FILM COATED ORAL at 17:01

## 2025-05-03 RX ADMIN — ISOSORBIDE MONONITRATE 30 MG: 30 TABLET, EXTENDED RELEASE ORAL at 08:34

## 2025-05-03 RX ADMIN — PREGABALIN 75 MG: 75 CAPSULE ORAL at 21:23

## 2025-05-03 RX ADMIN — INSULIN LISPRO 1 UNITS: 100 INJECTION, SOLUTION INTRAVENOUS; SUBCUTANEOUS at 21:21

## 2025-05-03 RX ADMIN — LEVOTHYROXINE SODIUM 75 MCG: 75 TABLET ORAL at 05:40

## 2025-05-03 RX ADMIN — DOCUSATE SODIUM 100 MG: 100 CAPSULE, LIQUID FILLED ORAL at 17:02

## 2025-05-03 RX ADMIN — ACETAMINOPHEN 650 MG: 325 TABLET, FILM COATED ORAL at 05:40

## 2025-05-03 RX ADMIN — PREGABALIN 75 MG: 75 CAPSULE ORAL at 17:01

## 2025-05-03 RX ADMIN — SENNOSIDES 8.6 MG: 8.6 TABLET, FILM COATED ORAL at 08:34

## 2025-05-03 RX ADMIN — LACTULOSE 30 G: 20 SOLUTION ORAL at 17:07

## 2025-05-03 RX ADMIN — HEPARIN SODIUM 5000 UNITS: 5000 INJECTION INTRAVENOUS; SUBCUTANEOUS at 08:34

## 2025-05-03 RX ADMIN — ACETAMINOPHEN 650 MG: 325 TABLET, FILM COATED ORAL at 11:27

## 2025-05-03 RX ADMIN — OXYCODONE HYDROCHLORIDE 5 MG: 5 TABLET ORAL at 02:09

## 2025-05-03 NOTE — ASSESSMENT & PLAN NOTE
Continue Toprol 50mg qhs, tikosyn 125mcg BID  Eliquis on hold -- resume when cleared by surgery  Patient's Entresto on hold -if systolic blood pressure stable renal function stable will resume likely Sunday

## 2025-05-03 NOTE — ASSESSMENT & PLAN NOTE
Wt Readings from Last 3 Encounters:   05/01/25 94.3 kg (208 lb)   04/24/25 94.3 kg (208 lb)   04/23/25 94.8 kg (209 lb)     Appears well compensated  Home regimen: jardiance 25mg daily, entresto, PRN lasix   Monitor volume status postoperatively  Continue GDMT as per home regimen - entresto on hold if systolic blood pressure and renal function stable would resume Sunday  jardiance on hold, can resume on discharge  Only uses Lasix as needed

## 2025-05-03 NOTE — ASSESSMENT & PLAN NOTE
Patient presented with back pain status post bilateral navigated revision L3-S1 laminectomy/decompression, revision L3-S1 posterior instrumented spinal fusion with TLIF on 5/1/2025  Management per orthopedic surgery  WBAT BLLE  PT/OT eval  LSO brace OOB  Remove DARWIN drain when appropriate per orthopedics

## 2025-05-03 NOTE — ASSESSMENT & PLAN NOTE
Status post Bilateral Navigated revision L3-S1 laminectomy/decompression, revision L3-S1 posterior instrumented spinal fusion with TLIF Date of Surgery 05/03/25    WBAT BLLE  PT/OT  Incentive spirometry  Pain control  DVT ppx: SQH  Diet: regular  Must wear LSO brace when out of bed  Lumbar spine precautions  Monitor drain output  Monitor for acute blood loss anemia and administer or recommend IVF/prbc as indicated for greater than 2 gram Hgb drop or Hgb < 7  Appreciate medicine team for medical comanagement  Dispo planning

## 2025-05-03 NOTE — ASSESSMENT & PLAN NOTE
WBC 15.5 --> 10.18 on am labs  Likely reactive in the setting of recent procedure  Remains afebrile and without additional s/sx of infection   Continue to monitor on CBC daily

## 2025-05-03 NOTE — ASSESSMENT & PLAN NOTE
Hgb 13.5 (4/10) --> 10.3 --> 9.4 on am labs this morning  Likely ABLA in the setting of recent surgery  Continue monitoring DARWIN drain output   Repeat CBC in the am

## 2025-05-03 NOTE — PROGRESS NOTES
Progress Note - Hospitalist   Name: Vesna Langston 65 y.o. female I MRN: [id number]  Unit/Bed#: -01 I Date of Admission: 5/1/2025   Date of Service: 5/3/2025 I Hospital Day: 2    Assessment & Plan  Status post lumbar spinal fusion  Patient presented with back pain status post bilateral navigated revision L3-S1 laminectomy/decompression, revision L3-S1 posterior instrumented spinal fusion with TLIF on 5/1/2025  Management per orthopedic surgery  WBAT BLLE  PT/OT eval  LSO brace OOB  Remove DARWIN drain when appropriate per orthopedics  Coronary artery disease involving native coronary artery of native heart  S/p stent in 3/2024  Continue Toprol-XL 50mg daily, imdur 30mg BID, plavix, statin  Plavix on hold until clearance per ortho  Other pulmonary embolism without acute cor pulmonale, unspecified chronicity (HCC)  Resume Eliquis once cleared by surgery service, as soon as possibly able  Currently on heparin subcutaneous 5000 units every 12 hours  Atrial flutter, paroxysmal (HCC)  Continue Toprol 50mg qhs, tikosyn 125mcg BID  Eliquis on hold -- resume when cleared by surgery  Patient's Entresto on hold -if systolic blood pressure stable renal function stable will resume likely Sunday  HFrEF (heart failure with reduced ejection fraction) (HCC)  Wt Readings from Last 3 Encounters:   05/01/25 94.3 kg (208 lb)   04/24/25 94.3 kg (208 lb)   04/23/25 94.8 kg (209 lb)     Appears well compensated  Home regimen: jardiance 25mg daily, entresto, PRN lasix   Monitor volume status postoperatively  Continue GDMT as per home regimen - entresto on hold if systolic blood pressure and renal function stable would resume Sunday  jardiance on hold, can resume on discharge  Only uses Lasix as needed  Acquired hypothyroidism  Continue levothyroxine  Class 1 obesity due to excess calories with serious comorbidity and body mass index (BMI) of 34.0 to 34.9 in adult  Encourage lifestyle changes  Type 2 diabetes mellitus, without long-term  current use of insulin ([facility])  Lab Results   Component Value Date    HGBA1C 5.9 (H) 03/28/2025       Recent Labs     05/02/25  2109 05/03/25  0551 05/03/25  1050 05/03/25  1549   POCGLU 142* 113 126 139       Blood Sugar Average: Last 72 hrs:  (P) 164.4  A1c at goal   Home regimen: jardiance, ozempic - severe reflux symptoms  Hold oral regimen while hospitalized  Sliding scale insulin   CCD  Continue to monitor and adjust regimen as needed  Leukocytosis  WBC 15.5 --> 10.18 on am labs  Likely reactive in the setting of recent procedure  Remains afebrile and without additional s/sx of infection   Continue to monitor on CBC daily   Anemia  Hgb 13.5 (4/10) --> 10.3 --> 9.4 on am labs this morning  Likely ABLA in the setting of recent surgery  Continue monitoring DARWIN drain output   Repeat CBC in the am     VTE Pharmacologic Prophylaxis:   High Risk (Score >/= 5) - Pharmacological DVT Prophylaxis Ordered: heparin. Sequential Compression Devices Ordered.    Mobility:   Basic Mobility Inpatient Raw Score: 20  JH-HLM Goal: 6: Walk 10 steps or more  JH-HLM Achieved: 8: Walk 250 feet ot more  JH-HLM Goal achieved. Continue to encourage appropriate mobility.    Patient Centered Rounds: I performed bedside rounds with nursing staff today.   Discussions with Specialists or Other Care Team Provider: Nursing/EP    Education and Discussions with Family / Patient:  Patient.     Current Length of Stay: 2 day(s)  Current Patient Status: Inpatient   Certification Statement: The patient will continue to require additional inpatient hospital stay due to postop management/pain control/drainage bulb  Discharge Plan: Anticipate discharge in 48-72 hrs to home.    Code Status: Prior    Subjective   Patient doing well this morning although significant pain.  No bowel movement as of this moment.  No chest pain or shortness of breath.  She does report that her device monitor was flashing orange colors last night.  Discussed with the EP they will  have device checked on Sunday.  Patient no numbness and tingling good sensation.    Objective :  Temp:  [97.8 °F (36.6 °C)-98.4 °F (36.9 °C)] 98.4 °F (36.9 °C)  HR:  [75-94] 94  BP: (101-129)/(56-98) 108/63  Resp:  [16] 16  SpO2:  [94 %-96 %] 96 %  O2 Device: None (Room air)    Body mass index is 31.63 kg/m².     Input and Output Summary (last 24 hours):     Intake/Output Summary (Last 24 hours) at 5/3/2025 1816  Last data filed at 5/3/2025 1223  Gross per 24 hour   Intake 180 ml   Output 162 ml   Net 18 ml       Physical Exam  Constitutional:       General: She is not in acute distress.     Appearance: She is not ill-appearing, toxic-appearing or diaphoretic.   HENT:      Head: Normocephalic and atraumatic.      Nose: No congestion.   Eyes:      General:         Right eye: No discharge.         Left eye: No discharge.   Cardiovascular:      Rate and Rhythm: Normal rate.      Heart sounds: No murmur heard.     No friction rub. No gallop.   Pulmonary:      Effort: No respiratory distress.      Breath sounds: No stridor. No wheezing, rhonchi or rales.   Chest:      Chest wall: No tenderness.   Abdominal:      General: There is no distension.      Palpations: There is no mass.      Tenderness: There is no abdominal tenderness. There is no guarding or rebound.      Hernia: No hernia is present.   Musculoskeletal:         General: Tenderness (posterior lumbar spine dressing cdi no erythema or edema. Pt also with nando bulb blood drainage) present. No swelling, deformity or signs of injury.      Right lower leg: No edema.      Left lower leg: No edema.   Skin:     Coloration: Skin is not jaundiced or pale.      Findings: No bruising, erythema, lesion or rash.   Neurological:      Mental Status: She is alert and oriented to person, place, and time.   Psychiatric:         Behavior: Behavior normal.           Lines/Drains:  Lines/Drains/Airways       Active Status       Name Placement date Placement time Site Days     Closed/Suction Drain Left Back Bulb 19 Fr. 05/01/25  1615  Back  2                            Lab Results: I have reviewed the following results:   Results from last 7 days   Lab Units 05/03/25  0446 05/02/25  0521   WBC Thousand/uL 10.18* 15.55*   HEMOGLOBIN g/dL 9.4* 10.3*   HEMATOCRIT % 30.2* 33.4*   PLATELETS Thousands/uL 198 259   SEGS PCT %  --  82*   LYMPHO PCT %  --  10*   MONO PCT %  --  7   EOS PCT %  --  0     Results from last 7 days   Lab Units 05/03/25  0446   SODIUM mmol/L 137   POTASSIUM mmol/L 3.9   CHLORIDE mmol/L 105   CO2 mmol/L 23   BUN mg/dL 11   CREATININE mg/dL 0.74   ANION GAP mmol/L 9   CALCIUM mg/dL 8.5   GLUCOSE RANDOM mg/dL 119         Results from last 7 days   Lab Units 05/03/25  1549 05/03/25  1050 05/03/25  0551 05/02/25  2109 05/02/25  1547 05/02/25  1052 05/01/25  2026 05/01/25  1703 05/01/25  1358 05/01/25  1006   POC GLUCOSE mg/dl 139 126 113 142* 162* 202* 263* 170* 195* 132               Recent Cultures (last 7 days):         Imaging Results Review: I reviewed radiology reports from this admission including: xray(s).  Other Study Results Review: No additional pertinent studies reviewed.    Last 24 Hours Medication List:     Current Facility-Administered Medications:     acetaminophen (TYLENOL) tablet 650 mg, Q6H MONA    aluminum-magnesium hydroxide-simethicone (MAALOX) oral suspension 30 mL, Q6H PRN    atorvastatin (LIPITOR) tablet 80 mg, QPM    calcium carbonate (TUMS) chewable tablet 1,000 mg, Daily PRN    docusate sodium (COLACE) capsule 100 mg, BID    dofetilide (TIKOSYN) capsule 125 mcg, Q12H MONA    fluticasone (FLONASE) 50 mcg/act nasal spray 1 spray, Daily    heparin (porcine) subcutaneous injection 5,000 Units, Q12H MONA    insulin lispro (HumALOG/ADMELOG) 100 units/mL subcutaneous injection 1-5 Units, HS    insulin lispro (HumALOG/ADMELOG) 100 units/mL subcutaneous injection 1-6 Units, TID AC **AND** Fingerstick Glucose (POCT), TID AC    isosorbide mononitrate (IMDUR) 24  hr tablet 30 mg, BID    lactated ringers infusion, Continuous, Last Rate: 125 mL/hr (05/01/25 3823)    lactulose (CHRONULAC) oral solution 30 g, Daily PRN    levothyroxine tablet 75 mcg, Daily    metoprolol succinate (TOPROL-XL) 24 hr tablet 50 mg, HS    ondansetron (ZOFRAN) injection 4 mg, Q6H PRN    oxyCODONE (ROXICODONE) IR tablet 10 mg, Q4H PRN    oxyCODONE (ROXICODONE) IR tablet 5 mg, Q4H PRN    pantoprazole (PROTONIX) EC tablet 40 mg, BID AC    pregabalin (LYRICA) capsule 75 mg, TID    senna (SENOKOT) tablet 8.6 mg, Daily    Administrative Statements   Today, Patient Was Seen By: MAHI De Paz      **Please Note: This note may have been constructed using a voice recognition system.**

## 2025-05-03 NOTE — ASSESSMENT & PLAN NOTE
Lab Results   Component Value Date    HGBA1C 5.9 (H) 03/28/2025       Recent Labs     05/02/25  2109 05/03/25  0551 05/03/25  1050 05/03/25  1549   POCGLU 142* 113 126 139       Blood Sugar Average: Last 72 hrs:  (P) 164.4  A1c at goal   Home regimen: jardiance, ozempic - severe reflux symptoms  Hold oral regimen while hospitalized  Sliding scale insulin   CCD  Continue to monitor and adjust regimen as needed

## 2025-05-03 NOTE — CASE MANAGEMENT
Case Management Discharge Planning Note    Patient name Vesna Langston  Location /-01 MRN [id number]  : 1960 Date 5/3/2025       Current Admission Date: 2025  Current Admission Diagnosis:Status post lumbar spinal fusion   Patient Active Problem List    Diagnosis Date Noted Date Diagnosed    Leukocytosis 2025     Anemia 2025     Status post lumbar spinal fusion 2025     Other spondylosis with radiculopathy, lumbar region 03/10/2025     Stage 2 chronic kidney disease 2025     Other pulmonary embolism without acute cor pulmonale, unspecified chronicity ([facility]) 2025     Acute on chronic systolic (congestive) heart failure ([facility]) 2025     Obesity, morbid ([facility]) 2025     Type 2 diabetes mellitus with stage 3 chronic kidney disease, without long-term current use of insulin, unspecified whether stage 3a or 3b CKD ([facility]) 2025     Numbness and tingling in left arm 2024     Primary osteoarthritis of right knee 10/08/2024     History of torn meniscus of right knee 10/08/2024     Chronic pain of right knee 10/08/2024     Localized edema 10/02/2024     POP (obstructive sleep apnea) 2024     Class 1 obesity due to excess calories with serious comorbidity and body mass index (BMI) of 34.0 to 34.9 in adult 2024     Ganglion cyst of finger of left hand 08/15/2024     History of pulmonary embolism 2024     History of gastric ulcer 2024     Acquired hypothyroidism 2024     Type 2 diabetes mellitus, without long-term current use of insulin ([facility]) 2024     S/P ICD (internal cardiac defibrillator) procedure 2024     Chronic low back pain 2024     HFrEF (heart failure with reduced ejection fraction) ([facility]) 2024     Ischemic cardiomyopathy 2024     Coronary artery disease involving native coronary artery of native heart 2024     S/P coronary artery stent placement 2024     Atrial flutter, paroxysmal  (HCC) 03/22/2024     Mixed hyperlipidemia 03/22/2024     History of tobacco abuse 03/22/2024     History of sustained ventricular tachycardia 03/22/2024     Back pain 07/31/2022     Sciatica of left side 07/31/2022       LOS (days): 2  Geometric Mean LOS (GMLOS) (days): 4  Days to GMLOS:2.3     OBJECTIVE:  Risk of Unplanned Readmission Score: 23.32         Current admission status: Inpatient   Preferred Pharmacy:   [facility]/pharmacy #[id number] [place], [place] - [address] [place] [address]  Phone: [phone] Fax: [phone]    [facility]/pharmacy #[id number] [facility] [place] - [address]  [address] [facility] [address]  Phone: [phone] Fax: [phone]    Primary Care Provider: Melissa Montoya DO    Primary Insurance: MEDICARE  Secondary Insurance: BLUE CROSS    DISCHARGE DETAILS:    Pt clinically accepted to [facility] once medically stable  CM will follow for updates

## 2025-05-03 NOTE — PROGRESS NOTES
Progress Note - Orthopedics   Name: Vesna Langston 65 y.o. female I MRN: [id number]  Unit/Bed#: -01 I Date of Admission: 5/1/2025   Date of Service: 5/3/2025 I Hospital Day: 2    Assessment & Plan  Status post lumbar spinal fusion  Status post Bilateral Navigated revision L3-S1 laminectomy/decompression, revision L3-S1 posterior instrumented spinal fusion with TLIF Date of Surgery 05/03/25    WBAT BLLE  PT/OT  Incentive spirometry  Pain control  DVT ppx: SQH  Diet: regular  Must wear LSO brace when out of bed  Lumbar spine precautions  Monitor drain output  Monitor for acute blood loss anemia and administer or recommend IVF/prbc as indicated for greater than 2 gram Hgb drop or Hgb < 7  Appreciate medicine team for medical comanagement  Dispo planning    Atrial flutter, paroxysmal (HCC)    HFrEF (heart failure with reduced ejection fraction) ([facility])  Wt Readings from Last 3 Encounters:   05/01/25 94.3 kg (208 lb)   04/24/25 94.3 kg (208 lb)   04/23/25 94.8 kg (209 lb)             Acquired hypothyroidism    Class 1 obesity due to excess calories with serious comorbidity and body mass index (BMI) of 34.0 to 34.9 in adult    Other pulmonary embolism without acute cor pulmonale, unspecified chronicity (HCC)    Coronary artery disease involving native coronary artery of native heart    Type 2 diabetes mellitus, without long-term current use of insulin ([facility])  Lab Results   Component Value Date    HGBA1C 5.9 (H) 03/28/2025       Recent Labs     05/02/25  1052 05/02/25  1547 05/02/25  2109 05/03/25  0551   POCGLU 202* 162* 142* 113       Blood Sugar Average: Last 72 hrs:  (P) 172.375    Leukocytosis    Anemia          Subjective   65 y.o.female doing well. No acute events, no new complaints. Pain well controlled. Denies fevers, chills, CP, SOB, N/V, numbness or tingling. Patient reports no issues with urination or bowel movements.    Objective :  Temp:  [97.6 °F (36.4 °C)-98 °F (36.7 °C)] 98 °F (36.7 °C)  HR:  [70-76]  "76  BP: (101-120)/(56-65) 101/57  Resp:  [16-18] 16  SpO2:  [94 %-96 %] 94 %  O2 Device: None (Room air)    Physical Exam   Musculoskeletal: Bilateral Lower Extremity  Dressing C/D/I  TTP suzanna-incisionally  Sensation intact to light touch L3-S1  Motor intact L2-S1 -- left sided 3/5 strength ankle DF, EHL  2+ DP pulse  No calf swelling or ttp  Greyson drain in place with ss output      Lab Results: I have reviewed the following results:  Recent Labs     05/02/25  0521 05/03/25  0446   WBC 15.55* 10.18*   HGB 10.3* 9.4*   HCT 33.4* 30.2*    198   BUN 12 11   CREATININE 0.69 0.74     Blood Culture:  No results found for: \"BLOODCX\"  Wound Culture: No results found for: \"WOUNDCULT\"        "

## 2025-05-03 NOTE — ASSESSMENT & PLAN NOTE
Resume Eliquis once cleared by surgery service, as soon as possibly able  Currently on heparin subcutaneous 5000 units every 12 hours

## 2025-05-03 NOTE — ASSESSMENT & PLAN NOTE
Lab Results   Component Value Date    HGBA1C 5.9 (H) 03/28/2025       Recent Labs     05/02/25  1052 05/02/25  1547 05/02/25  2109 05/03/25  0551   POCGLU 202* 162* 142* 113       Blood Sugar Average: Last 72 hrs:  (P) 172.375

## 2025-05-03 NOTE — PROGRESS NOTES
Patient:    MRN:  [id number]    Zayda Request ID:  [id number]    Level of care reserved:  Inpatient Rehab Facility    Partner Reserved:  [facility] - ([place]), [place] [address] [phone]    Clinical needs requested:    Geography searched:  25 miles around [id number]    Start of Service:    Request sent:  3:51pm EDT on 5/2/2025 by Yesica Valle    Partner reserved:  9:37am EDT on 5/3/2025 by Jonnie Gill    Choice list shared:  9:37am EDT on 5/3/2025 by Jonnie Gill

## 2025-05-04 LAB
ANION GAP SERPL CALCULATED.3IONS-SCNC: 7 MMOL/L (ref 4–13)
BASOPHILS # BLD AUTO: 0.04 THOUSANDS/ÂΜL (ref 0–0.1)
BASOPHILS NFR BLD AUTO: 0 % (ref 0–1)
BUN SERPL-MCNC: 11 MG/DL (ref 5–25)
CALCIUM SERPL-MCNC: 8.4 MG/DL (ref 8.4–10.2)
CHLORIDE SERPL-SCNC: 102 MMOL/L (ref 96–108)
CO2 SERPL-SCNC: 27 MMOL/L (ref 21–32)
CREAT SERPL-MCNC: 0.64 MG/DL (ref 0.6–1.3)
EOSINOPHIL # BLD AUTO: 0.24 THOUSAND/ÂΜL (ref 0–0.61)
EOSINOPHIL NFR BLD AUTO: 2 % (ref 0–6)
ERYTHROCYTE [DISTWIDTH] IN BLOOD BY AUTOMATED COUNT: 16.1 % (ref 11.6–15.1)
GFR SERPL CREATININE-BSD FRML MDRD: 92 ML/MIN/1.73SQ M
GLUCOSE SERPL-MCNC: 115 MG/DL (ref 65–140)
GLUCOSE SERPL-MCNC: 121 MG/DL (ref 65–140)
GLUCOSE SERPL-MCNC: 127 MG/DL (ref 65–140)
GLUCOSE SERPL-MCNC: 142 MG/DL (ref 65–140)
GLUCOSE SERPL-MCNC: 156 MG/DL (ref 65–140)
HCT VFR BLD AUTO: 31 % (ref 34.8–46.1)
HGB BLD-MCNC: 9.5 G/DL (ref 11.5–15.4)
IMM GRANULOCYTES # BLD AUTO: 0.05 THOUSAND/UL (ref 0–0.2)
IMM GRANULOCYTES NFR BLD AUTO: 0 % (ref 0–2)
LYMPHOCYTES # BLD AUTO: 2.41 THOUSANDS/ÂΜL (ref 0.6–4.47)
LYMPHOCYTES NFR BLD AUTO: 21 % (ref 14–44)
MAGNESIUM SERPL-MCNC: 1.6 MG/DL (ref 1.9–2.7)
MCH RBC QN AUTO: 26.1 PG (ref 26.8–34.3)
MCHC RBC AUTO-ENTMCNC: 30.6 G/DL (ref 31.4–37.4)
MCV RBC AUTO: 85 FL (ref 82–98)
MONOCYTES # BLD AUTO: 1.52 THOUSAND/ÂΜL (ref 0.17–1.22)
MONOCYTES NFR BLD AUTO: 13 % (ref 4–12)
NEUTROPHILS # BLD AUTO: 7.15 THOUSANDS/ÂΜL (ref 1.85–7.62)
NEUTS SEG NFR BLD AUTO: 64 % (ref 43–75)
NRBC BLD AUTO-RTO: 0 /100 WBCS
PLATELET # BLD AUTO: 195 THOUSANDS/UL (ref 149–390)
PMV BLD AUTO: 9.6 FL (ref 8.9–12.7)
POTASSIUM SERPL-SCNC: 3.6 MMOL/L (ref 3.5–5.3)
RBC # BLD AUTO: 3.64 MILLION/UL (ref 3.81–5.12)
SODIUM SERPL-SCNC: 136 MMOL/L (ref 135–147)
WBC # BLD AUTO: 11.41 THOUSAND/UL (ref 4.31–10.16)

## 2025-05-04 PROCEDURE — 85025 COMPLETE CBC W/AUTO DIFF WBC: CPT | Performed by: NURSE PRACTITIONER

## 2025-05-04 PROCEDURE — 83735 ASSAY OF MAGNESIUM: CPT | Performed by: NURSE PRACTITIONER

## 2025-05-04 PROCEDURE — 80048 BASIC METABOLIC PNL TOTAL CA: CPT | Performed by: NURSE PRACTITIONER

## 2025-05-04 PROCEDURE — 99232 SBSQ HOSP IP/OBS MODERATE 35: CPT | Performed by: NURSE PRACTITIONER

## 2025-05-04 PROCEDURE — NC001 PR NO CHARGE: Performed by: ORTHOPAEDIC SURGERY

## 2025-05-04 PROCEDURE — 82948 REAGENT STRIP/BLOOD GLUCOSE: CPT

## 2025-05-04 RX ORDER — MAGNESIUM SULFATE HEPTAHYDRATE 40 MG/ML
2 INJECTION, SOLUTION INTRAVENOUS ONCE
Status: COMPLETED | OUTPATIENT
Start: 2025-05-04 | End: 2025-05-04

## 2025-05-04 RX ADMIN — OXYCODONE HYDROCHLORIDE 10 MG: 5 TABLET ORAL at 08:54

## 2025-05-04 RX ADMIN — DOCUSATE SODIUM 100 MG: 100 CAPSULE, LIQUID FILLED ORAL at 17:13

## 2025-05-04 RX ADMIN — PREGABALIN 75 MG: 75 CAPSULE ORAL at 17:13

## 2025-05-04 RX ADMIN — DOFETILIDE 125 MCG: 0.12 CAPSULE ORAL at 08:55

## 2025-05-04 RX ADMIN — ACETAMINOPHEN 650 MG: 325 TABLET, FILM COATED ORAL at 17:13

## 2025-05-04 RX ADMIN — DOCUSATE SODIUM 100 MG: 100 CAPSULE, LIQUID FILLED ORAL at 08:53

## 2025-05-04 RX ADMIN — ACETAMINOPHEN 650 MG: 325 TABLET, FILM COATED ORAL at 11:20

## 2025-05-04 RX ADMIN — DOFETILIDE 125 MCG: 0.12 CAPSULE ORAL at 21:22

## 2025-05-04 RX ADMIN — MAGNESIUM SULFATE HEPTAHYDRATE 2 G: 40 INJECTION, SOLUTION INTRAVENOUS at 16:16

## 2025-05-04 RX ADMIN — PANTOPRAZOLE SODIUM 40 MG: 40 TABLET, DELAYED RELEASE ORAL at 06:30

## 2025-05-04 RX ADMIN — ACETAMINOPHEN 650 MG: 325 TABLET, FILM COATED ORAL at 23:39

## 2025-05-04 RX ADMIN — OXYCODONE HYDROCHLORIDE 5 MG: 5 TABLET ORAL at 21:11

## 2025-05-04 RX ADMIN — LEVOTHYROXINE SODIUM 75 MCG: 75 TABLET ORAL at 05:15

## 2025-05-04 RX ADMIN — PREGABALIN 75 MG: 75 CAPSULE ORAL at 08:54

## 2025-05-04 RX ADMIN — HEPARIN SODIUM 5000 UNITS: 5000 INJECTION INTRAVENOUS; SUBCUTANEOUS at 21:14

## 2025-05-04 RX ADMIN — PREGABALIN 75 MG: 75 CAPSULE ORAL at 21:12

## 2025-05-04 RX ADMIN — OXYCODONE HYDROCHLORIDE 5 MG: 5 TABLET ORAL at 02:25

## 2025-05-04 RX ADMIN — ACETAMINOPHEN 650 MG: 325 TABLET, FILM COATED ORAL at 05:16

## 2025-05-04 RX ADMIN — ATORVASTATIN CALCIUM 80 MG: 80 TABLET, FILM COATED ORAL at 17:13

## 2025-05-04 RX ADMIN — SENNOSIDES 8.6 MG: 8.6 TABLET, FILM COATED ORAL at 08:53

## 2025-05-04 RX ADMIN — PANTOPRAZOLE SODIUM 40 MG: 40 TABLET, DELAYED RELEASE ORAL at 17:13

## 2025-05-04 RX ADMIN — INSULIN LISPRO 1 UNITS: 100 INJECTION, SOLUTION INTRAVENOUS; SUBCUTANEOUS at 21:23

## 2025-05-04 RX ADMIN — FLUTICASONE PROPIONATE 1 SPRAY: 50 SPRAY, METERED NASAL at 08:55

## 2025-05-04 RX ADMIN — HEPARIN SODIUM 5000 UNITS: 5000 INJECTION INTRAVENOUS; SUBCUTANEOUS at 08:54

## 2025-05-04 RX ADMIN — OXYCODONE HYDROCHLORIDE 5 MG: 5 TABLET ORAL at 13:28

## 2025-05-04 NOTE — ASSESSMENT & PLAN NOTE
Lab Results   Component Value Date    HGBA1C 5.9 (H) 03/28/2025       Recent Labs     05/03/25  1050 05/03/25  1549 05/03/25 2056 05/04/25  0611   POCGLU 126 139 154* 121       Blood Sugar Average: Last 72 hrs:  (P) 159.4221004837412158  A1c at goal   Home regimen: jardiance, ozempic - severe reflux symptoms  Hold oral regimen while hospitalized  Sliding scale insulin   CCD  Continue to monitor and adjust regimen as needed

## 2025-05-04 NOTE — ASSESSMENT & PLAN NOTE
Status post Bilateral Navigated revision L3-S1 laminectomy/decompression, revision L3-S1 posterior instrumented spinal fusion with TLIF on 5/1/2025 doing well postoperatively.     Some fluid overload today - will discuss with medicine team adding PRN lasix    WBAT BLLE  PT/OT  Incentive spirometry  Pain control  DVT ppx: SQH  Diet: cardiovascular and carb controlled  Must wear LSO brace when out of bed  Lumbar spine precautions  Monitor drain output  Monitor for acute blood loss anemia and administer or recommend IVF/prbc as indicated for greater than 2 gram Hgb drop or Hgb < 7  Appreciate medicine team for medical comanagement  Dispo planning

## 2025-05-04 NOTE — ASSESSMENT & PLAN NOTE
Continue Toprol 50mg qhs, tikosyn 125mcg BID  Eliquis on hold -- resume when cleared by surgery  Patient's Entresto on hold -if systolic blood pressure stable renal function stable will resume likely Monday sbp remain soft at this time

## 2025-05-04 NOTE — ASSESSMENT & PLAN NOTE
Patient presented with back pain status post bilateral navigated revision L3-S1 laminectomy/decompression, revision L3-S1 posterior instrumented spinal fusion with TLIF on 5/1/2025  Management per orthopedic surgery  WBAT BLLE  PT/OT eval  LSO brace OOB  Remove DARIWN drain when appropriate per orthopedics

## 2025-05-04 NOTE — ASSESSMENT & PLAN NOTE
Lab Results   Component Value Date    HGBA1C 5.9 (H) 03/28/2025       Recent Labs     05/03/25  1050 05/03/25  1549 05/03/25 2056 05/04/25  0611   POCGLU 126 139 154* 121       Blood Sugar Average: Last 72 hrs:  (P) 159.4158912552389392

## 2025-05-04 NOTE — PROGRESS NOTES
Progress Note - Orthopedics   Name: Vesna Langston 65 y.o. female I MRN: [id number]  Unit/Bed#: -01 I Date of Admission: 5/1/2025   Date of Service: 5/5/2025 I Hospital Day: 4    Assessment & Plan  Status post lumbar spinal fusion  Status post Bilateral Navigated revision L3-S1 laminectomy/decompression, revision L3-S1 posterior instrumented spinal fusion with TLIF on 5/1/2025 doing well postoperatively.     Some fluid overload today - will discuss with medicine team adding PRN lasix    WBAT BLLE  PT/OT  Incentive spirometry  Pain control  DVT ppx: SQH  Diet: cardiovascular and carb controlled  Must wear LSO brace when out of bed  Lumbar spine precautions  Monitor drain output  Monitor for acute blood loss anemia and administer or recommend IVF/prbc as indicated for greater than 2 gram Hgb drop or Hgb < 7  Appreciate medicine team for medical comanagement  Dispo planning      Please contact the SecureChat role for the Orthopedics service with any questions/concerns.    Subjective   No acute events, no acute distress. Denies fever/chills, SOB. Pain well controlled.  Patient states she feels her legs are little bit swollen and that she might need some of her as needed Lasix.     Objective      Temp:  [97.7 °F (36.5 °C)-99.5 °F (37.5 °C)] 97.9 °F (36.6 °C)  HR:  [91-99] 99  BP: (103-112)/(54-67) 107/54  Resp:  [16-18] 18  SpO2:  [92 %-98 %] 94 %  O2 Device: None (Room air)  O2 Device: None (Room air)          I/O         05/03 0701  05/04 0700 05/04 0701  05/05 0700    P.O. 780 420    Total Intake(mL/kg) 780 (8.3) 420 (4.1)    Urine (mL/kg/hr) 200 (0.1) 1150 (0.5)    Drains 160 165    Stool 0 0    Total Output 360 1315    Net +420 -895          Unmeasured Urine Occurrence 6 x 1 x    Unmeasured Stool Occurrence 2 x 1 x          Lines/Drains/Airways       Active Status       Name Placement date Placement time Site Days    Closed/Suction Drain Left Back Bulb 19 Fr. 05/01/25  1615  Back  3                  Physical Exam  "  Musculoskeletal: Lumbar Spine  Dressing C/D/I   TTP suzanna-incisionally  Sensation intact to light touch L3-S1  Motor intact L2-S1 -- see below for detailed strength exam  2+ DP pulse  No calf swelling or ttp  Drain in place with serosanguinous output    Lower Extremity Motor Function    Right  Left    Iliopsoas  5/5  5/5    Quadriceps 5/5 5/5   Tibialis anterior  5/5  3/5    EHL  5/5  4/5    Gastroc. muscle  5/5 4/5            Lab Results: I have reviewed the following results:  Recent Labs     05/03/25  0446 05/04/25  0459 05/05/25  0434   WBC 10.18* 11.41* 8.39   HGB 9.4* 9.5* 9.4*   HCT 30.2* 31.0* 30.5*    195 211   BUN 11 11 10   CREATININE 0.74 0.64 0.56*     Blood Culture:  No results found for: \"BLOODCX\"  Wound Culture: No results found for: \"WOUNDCULT\"    "

## 2025-05-04 NOTE — PROGRESS NOTES
Progress Note - Hospitalist   Name: Vesna Langston 65 y.o. female I MRN: [id number]  Unit/Bed#: -01 I Date of Admission: 5/1/2025   Date of Service: 5/4/2025 I Hospital Day: 3    Assessment & Plan  Status post lumbar spinal fusion  Patient presented with back pain status post bilateral navigated revision L3-S1 laminectomy/decompression, revision L3-S1 posterior instrumented spinal fusion with TLIF on 5/1/2025  Management per orthopedic surgery  WBAT BLLE  PT/OT eval  LSO brace OOB  Remove DARWIN drain when appropriate per orthopedics  Other pulmonary embolism without acute cor pulmonale, unspecified chronicity (HCC)  Resume Eliquis once cleared by surgery service, as soon as possibly able  Currently on heparin subcutaneous 5000 units every 12 hours  Coronary artery disease involving native coronary artery of native heart  S/p stent in 3/2024  Continue Toprol-XL 50mg daily, imdur 30mg BID, plavix, statin  Plavix on hold until clearance per ortho  Atrial flutter, paroxysmal ([facility])  Continue Toprol 50mg qhs, tikosyn 125mcg BID  Eliquis on hold -- resume when cleared by surgery  Patient's Entresto on hold -if systolic blood pressure stable renal function stable will resume likely Monday sbp remain soft at this time   HFrEF (heart failure with reduced ejection fraction) ([facility])  Wt Readings from Last 3 Encounters:   05/01/25 94.3 kg (208 lb)   04/24/25 94.3 kg (208 lb)   04/23/25 94.8 kg (209 lb)     Appears well compensated  Home regimen: jardiance 25mg daily, entresto, PRN lasix   Monitor volume status postoperatively  Continue GDMT as per home regimen - entresto on hold if systolic blood pressure and renal function stable would resume Sunday  jardiance on hold, can resume on discharge  Only uses Lasix as needed  Acquired hypothyroidism  Continue levothyroxine  Class 1 obesity due to excess calories with serious comorbidity and body mass index (BMI) of 34.0 to 34.9 in adult  Encourage lifestyle changes  Type 2 diabetes  mellitus, without long-term current use of insulin ([facility])  Lab Results   Component Value Date    HGBA1C 5.9 (H) 03/28/2025       Recent Labs     05/03/25  1050 05/03/25  1549 05/03/25 2056 05/04/25  0611   POCGLU 126 139 154* 121       Blood Sugar Average: Last 72 hrs:  (P) [id number]  A1c at goal   Home regimen: jardiance, ozempic - severe reflux symptoms  Hold oral regimen while hospitalized  Sliding scale insulin   CCD  Continue to monitor and adjust regimen as needed  Leukocytosis  WBC 15.5 --> 10.18-> 11.41 on am labs  Likely reactive in the setting of recent procedure  Remains afebrile and without additional s/sx of infection   Continue to monitor on CBC daily   Anemia  Hgb 13.5 (4/10) --> 10.3 --> 9.4 - > 9.5 on am labs this morning  Likely ABLA in the setting of recent surgery  Continue monitoring NANDO drain output   Repeat CBC in the am     VTE Pharmacologic Prophylaxis:   High Risk (Score >/= 5) - Pharmacological DVT Prophylaxis Ordered: heparin. Sequential Compression Devices Ordered. Q 12 hrs     Mobility:   Basic Mobility Inpatient Raw Score: 20  JH-HLM Goal: 6: Walk 10 steps or more  JH-HLM Achieved: 7: Walk 25 feet or more  JH-HLM Goal achieved. Continue to encourage appropriate mobility.    Patient Centered Rounds: I performed bedside rounds with nursing staff today.   Discussions with Specialists or Other Care Team Provider: nursing     Education and Discussions with Family / Patient:  patient .     Current Length of Stay: 3 day(s)  Current Patient Status: Inpatient   Certification Statement: The patient will continue to require additional inpatient hospital stay due to ongoing treatment post op / nando drain in place / pain control   Discharge Plan: Anticipate discharge in 48-72 hrs to likely [facility]    Code Status: Prior    Subjective   Upon arrival to room patient is sitting in bed.  She reports she did get up and ambulate a little this morning.  No dizziness or lightheadedness we discussed her  blood pressures being on the lower side.  She is however feeling a lot better but still has ongoing pain.  Denies numbness and tingling in right hand does continue to discuss left lower extremity chronic foot drop.  She does report having multiple bowel movements since last night status post bowel regimen increase understand she can refuse some of it if any diarrhea.  But feels a lot better.  Denies any chest pain shortness of breath no palpitations    Objective :  Temp:  [97.7 °F (36.5 °C)-99 °F (37.2 °C)] 97.7 °F (36.5 °C)  HR:  [90-99] 99  BP: ()/(50-63) 103/56  Resp:  [16-18] 16  SpO2:  [93 %-97 %] 97 %  O2 Device: None (Room air)    Body mass index is 31.63 kg/m².     Input and Output Summary (last 24 hours):     Intake/Output Summary (Last 24 hours) at 5/4/2025 1008  Last data filed at 5/4/2025 0901  Gross per 24 hour   Intake 780 ml   Output 400 ml   Net 380 ml       Physical Exam  Constitutional:       General: She is not in acute distress.     Appearance: She is obese. She is not ill-appearing, toxic-appearing or diaphoretic.   HENT:      Head: Normocephalic and atraumatic.      Nose: No congestion.   Eyes:      General:         Right eye: No discharge.         Left eye: No discharge.   Cardiovascular:      Rate and Rhythm: Normal rate.      Heart sounds: No murmur heard.     No friction rub. No gallop.   Pulmonary:      Effort: No respiratory distress.      Breath sounds: No stridor. No wheezing, rhonchi or rales.   Chest:      Chest wall: No tenderness.   Abdominal:      General: There is no distension.      Palpations: There is no mass.      Tenderness: There is no abdominal tenderness. There is no guarding or rebound.      Hernia: No hernia is present.   Musculoskeletal:         General: Tenderness present. No swelling, deformity or signs of injury.      Right lower leg: No edema.      Left lower leg: No edema.      Comments: Lumbar spine dsd intact nando drain with about 30 cc SS drainage    Skin:      Coloration: Skin is not jaundiced or pale.      Findings: No bruising, erythema, lesion or rash.   Neurological:      Mental Status: She is alert and oriented to person, place, and time. Mental status is at baseline.      Comments: Left leg foot drop - pre surgery    Psychiatric:         Behavior: Behavior normal.           Lines/Drains:  Lines/Drains/Airways       Active Status       Name Placement date Placement time Site Days    Closed/Suction Drain Left Back Bulb 19 Fr. 05/01/25  1615  Back  2                            Lab Results: I have reviewed the following results:   Results from last 7 days   Lab Units 05/04/25  0459   WBC Thousand/uL 11.41*   HEMOGLOBIN g/dL 9.5*   HEMATOCRIT % 31.0*   PLATELETS Thousands/uL 195   SEGS PCT % 64   LYMPHO PCT % 21   MONO PCT % 13*   EOS PCT % 2     Results from last 7 days   Lab Units 05/04/25  0459   SODIUM mmol/L 136   POTASSIUM mmol/L 3.6   CHLORIDE mmol/L 102   CO2 mmol/L 27   BUN mg/dL 11   CREATININE mg/dL 0.64   ANION GAP mmol/L 7   CALCIUM mg/dL 8.4   GLUCOSE RANDOM mg/dL 115         Results from last 7 days   Lab Units 05/04/25  0611 05/03/25  2056 05/03/25  1549 05/03/25  1050 05/03/25  0551 05/02/25  2109 05/02/25  1547 05/02/25  1052 05/01/25  2026 05/01/25  1703 05/01/25  1358 05/01/25  1006   POC GLUCOSE mg/dl 121 154* 139 126 113 142* 162* 202* 263* 170* 195* 132               Recent Cultures (last 7 days):         Imaging Results Review: No pertinent imaging studies reviewed.  Other Study Results Review: No additional pertinent studies reviewed.    Last 24 Hours Medication List:     Current Facility-Administered Medications:     acetaminophen (TYLENOL) tablet 650 mg, Q6H MONA    aluminum-magnesium hydroxide-simethicone (MAALOX) oral suspension 30 mL, Q6H PRN    atorvastatin (LIPITOR) tablet 80 mg, QPM    calcium carbonate (TUMS) chewable tablet 1,000 mg, Daily PRN    docusate sodium (COLACE) capsule 100 mg, BID    dofetilide (TIKOSYN) capsule 125 mcg, Q12H  MONA    fluticasone (FLONASE) 50 mcg/act nasal spray 1 spray, Daily    heparin (porcine) subcutaneous injection 5,000 Units, Q12H MONA    insulin lispro (HumALOG/ADMELOG) 100 units/mL subcutaneous injection 1-5 Units, HS    insulin lispro (HumALOG/ADMELOG) 100 units/mL subcutaneous injection 1-6 Units, TID AC **AND** Fingerstick Glucose (POCT), TID AC    isosorbide mononitrate (IMDUR) 24 hr tablet 30 mg, BID    lactated ringers infusion, Continuous, Last Rate: 125 mL/hr (05/01/25 5443)    lactulose (CHRONULAC) oral solution 30 g, Daily PRN    levothyroxine tablet 75 mcg, Daily    metoprolol succinate (TOPROL-XL) 24 hr tablet 50 mg, HS    ondansetron (ZOFRAN) injection 4 mg, Q6H PRN    oxyCODONE (ROXICODONE) IR tablet 10 mg, Q4H PRN    oxyCODONE (ROXICODONE) IR tablet 5 mg, Q4H PRN    pantoprazole (PROTONIX) EC tablet 40 mg, BID AC    pregabalin (LYRICA) capsule 75 mg, TID    senna (SENOKOT) tablet 8.6 mg, Daily    Administrative Statements   Today, Patient Was Seen By: MAHI De Paz      **Please Note: This note may have been constructed using a voice recognition system.**

## 2025-05-04 NOTE — PROGRESS NOTES
Progress Note - Orthopedics   Name: Vesna Langston 65 y.o. female I MRN: [id number]  Unit/Bed#: -01 I Date of Admission: 5/1/2025   Date of Service: 5/4/2025 I Hospital Day: 3    Assessment & Plan  Status post lumbar spinal fusion  Status post Bilateral Navigated revision L3-S1 laminectomy/decompression, revision L3-S1 posterior instrumented spinal fusion with TLIF Date of Surgery 5/1/25    WBAT BLLE  PT/OT  Incentive spirometry  Pain control  DVT ppx: SQH  Diet: regular  Must wear LSO brace when out of bed  Lumbar spine precautions  Monitor drain output  Monitor for acute blood loss anemia and administer or recommend IVF/prbc as indicated for greater than 2 gram Hgb drop or Hgb < 7  Appreciate medicine team for medical comanagement  Dispo planning    Atrial flutter, paroxysmal (HCC)    HFrEF (heart failure with reduced ejection fraction) ([facility])  Wt Readings from Last 3 Encounters:   05/01/25 94.3 kg (208 lb)   04/24/25 94.3 kg (208 lb)   04/23/25 94.8 kg (209 lb)             Acquired hypothyroidism    Class 1 obesity due to excess calories with serious comorbidity and body mass index (BMI) of 34.0 to 34.9 in adult    Other pulmonary embolism without acute cor pulmonale, unspecified chronicity (HCC)    Coronary artery disease involving native coronary artery of native heart    Type 2 diabetes mellitus, without long-term current use of insulin (HCC)  Lab Results   Component Value Date    HGBA1C 5.9 (H) 03/28/2025       Recent Labs     05/03/25  1050 05/03/25  1549 05/03/25  2056 05/04/25  0611   POCGLU 126 139 154* 121       Blood Sugar Average: Last 72 hrs:  (P) 159.0885769902477137    Leukocytosis    Anemia          Subjective   65 y.o.female doing well. No acute events, no new complaints. Pain well controlled. Denies fevers, chills, CP, SOB, N/V, numbness or tingling.    Objective :  Temp:  [97.8 °F (36.6 °C)-99 °F (37.2 °C)] 99 °F (37.2 °C)  HR:  [83-94] 90  BP: ()/(50-98) 109/54  Resp:  [16-18] 18  SpO2:   "[93 %-96 %] 93 %  O2 Device: None (Room air)    Physical Exam  Musculoskeletal: Bilateral Lower Extremity  Dressing C/D/I  TTP suzanna-incisionally  Sensation intact to light touch L3-S1  Motor intact L2-S1 -- left sided 3/5 strength ankle DF, EHL  2+ DP pulse  No calf swelling or ttp  Greyson drain in place with ss output      Lab Results: I have reviewed the following results:  Recent Labs     05/02/25  0521 05/03/25  0446 05/04/25  0459   WBC 15.55* 10.18* 11.41*   HGB 10.3* 9.4* 9.5*   HCT 33.4* 30.2* 31.0*    198 195   BUN 12 11 11   CREATININE 0.69 0.74 0.64     Blood Culture:  No results found for: \"BLOODCX\"  Wound Culture: No results found for: \"WOUNDCULT\"        " 0

## 2025-05-04 NOTE — ASSESSMENT & PLAN NOTE
Hgb 13.5 (4/10) --> 10.3 --> 9.4 - > 9.5 on am labs this morning  Likely ABLA in the setting of recent surgery  Continue monitoring DARWIN drain output   Repeat CBC in the am

## 2025-05-04 NOTE — ASSESSMENT & PLAN NOTE
Status post Bilateral Navigated revision L3-S1 laminectomy/decompression, revision L3-S1 posterior instrumented spinal fusion with TLIF Date of Surgery 5/1/25    WBAT BLLE  PT/OT  Incentive spirometry  Pain control  DVT ppx: SQH  Diet: regular  Must wear LSO brace when out of bed  Lumbar spine precautions  Monitor drain output  Monitor for acute blood loss anemia and administer or recommend IVF/prbc as indicated for greater than 2 gram Hgb drop or Hgb < 7  Appreciate medicine team for medical comanagement  Dispo planning

## 2025-05-04 NOTE — ASSESSMENT & PLAN NOTE
History and Physical - [facility]  Kimberly Velasquez 27 y o  female MRN: [id number]                  HPI: Kimberly Velasquez is a 27y o  year old female who presents for colonoscopy for change in bowel habit with chronic diarrhea      REVIEW OF SYSTEMS: Per the HPI, and otherwise unremarkable  Historical Information   Past Medical History:   Diagnosis Date    Anxiety     Bipolar 2 disorder, major depressive episode ([facility] [address] ) 2021    Bipolar disorder with depression ([facility] )     Last assessed: 16    Colon polyp     Depression     Endometriosis     Last assessed: 16    Gastroparesis     GERD (gastroesophageal reflux disease)     Hypertension     Inflammatory bowel disease     Scoliosis     Scoliosis     Varicella      Past Surgical History:   Procedure Laterality Date     SECTION      CHOLECYSTECTOMY      COLONOSCOPY      HERNIA REPAIR      HERNIA REPAIR      OTHER SURGICAL HISTORY      Transoral incisionless fundoplication (TIFF)    PA  DELIVERY ONLY N/A 2016    Procedure:  SECTION ();   Surgeon: Jef Steen DO;  Location: [facility];  Service: Obstetrics    PA INCIS TENDON SHEATH,RADIAL STYLOID Right 2020    Procedure: Jamia Person RIGHT WRIST;  Surgeon: Osmany Hatch MD;  Location: [facility] MAIN OR;  Service: Orthopedics    PA LAP,CHOLECYSTECTOMY N/A 3/12/2018    Procedure: Paige Saini;  Surgeon: Rickey Rondon MD;  Location: [facility] MAIN OR;  Service: General    TONSILECTOMY AND ADNOIDECTOMY Bilateral     TONSILLECTOMY      WISDOM TOOTH EXTRACTION Bilateral      Social History   Social History     Substance and Sexual Activity   Alcohol Use Not Currently    Alcohol/week: 0 0 standard drinks    Comment: rare     Social History     Substance and Sexual Activity   Drug Use No     Social History     Tobacco Use   Smoking Status Light Tobacco Smoker    Packs/day: 0 50    Years: 1 00    Pack years: 0 50    WBC 15.5 --> 10.18-> 11.41 on am labs  Likely reactive in the setting of recent procedure  Remains afebrile and without additional s/sx of infection   Continue to monitor on CBC daily    Types: Cigarettes    Start date: 2020    Last attempt to quit: 2016    Years since quittin 6   Smokeless Tobacco Never Used     Family History   Problem Relation Age of Onset    Hypertension Father     Depression Mother     Asthma Mother     Anxiety disorder Mother     Bipolar disorder Mother     Depression Maternal Grandmother     Anxiety disorder Maternal Grandmother     Bipolar disorder Maternal Grandmother        Meds/Allergies     (Not in a hospital admission)      Allergies   Allergen Reactions    Sulfa Antibiotics Anaphylaxis, Other (See Comments) and Edema     swelling  swelling  swelling    Reglan [Metoclopramide] Anxiety       Objective     /93   Pulse 80   Temp 98 °F (36 7 °C) (Temporal)   Resp 18   Ht 5' 4" (1 626 m)   Wt 58 1 kg (128 lb)   SpO2 100%   BMI 21 97 kg/m²       PHYSICAL EXAM    Gen: NAD  CV: RRR  CHEST: Clear  ABD: soft, NT/ND  EXT: no edema      ASSESSMENT/PLAN:  This is a 27y o  year old female here for colonoscopy, and she is stable and optimized for her procedure

## 2025-05-05 LAB
ANION GAP SERPL CALCULATED.3IONS-SCNC: 11 MMOL/L (ref 4–13)
BASOPHILS # BLD AUTO: 0.02 THOUSANDS/ÂΜL (ref 0–0.1)
BASOPHILS NFR BLD AUTO: 0 % (ref 0–1)
BUN SERPL-MCNC: 10 MG/DL (ref 5–25)
CALCIUM SERPL-MCNC: 8.6 MG/DL (ref 8.4–10.2)
CHLORIDE SERPL-SCNC: 103 MMOL/L (ref 96–108)
CO2 SERPL-SCNC: 25 MMOL/L (ref 21–32)
CREAT SERPL-MCNC: 0.56 MG/DL (ref 0.6–1.3)
EOSINOPHIL # BLD AUTO: 0.36 THOUSAND/ÂΜL (ref 0–0.61)
EOSINOPHIL NFR BLD AUTO: 4 % (ref 0–6)
ERYTHROCYTE [DISTWIDTH] IN BLOOD BY AUTOMATED COUNT: 16.1 % (ref 11.6–15.1)
GFR SERPL CREATININE-BSD FRML MDRD: 96 ML/MIN/1.73SQ M
GLUCOSE SERPL-MCNC: 111 MG/DL (ref 65–140)
GLUCOSE SERPL-MCNC: 117 MG/DL (ref 65–140)
GLUCOSE SERPL-MCNC: 129 MG/DL (ref 65–140)
GLUCOSE SERPL-MCNC: 158 MG/DL (ref 65–140)
GLUCOSE SERPL-MCNC: 167 MG/DL (ref 65–140)
HCT VFR BLD AUTO: 30.5 % (ref 34.8–46.1)
HGB BLD-MCNC: 9.4 G/DL (ref 11.5–15.4)
IMM GRANULOCYTES # BLD AUTO: 0.04 THOUSAND/UL (ref 0–0.2)
IMM GRANULOCYTES NFR BLD AUTO: 1 % (ref 0–2)
LYMPHOCYTES # BLD AUTO: 2.37 THOUSANDS/ÂΜL (ref 0.6–4.47)
LYMPHOCYTES NFR BLD AUTO: 28 % (ref 14–44)
MAGNESIUM SERPL-MCNC: 2 MG/DL (ref 1.9–2.7)
MCH RBC QN AUTO: 25.9 PG (ref 26.8–34.3)
MCHC RBC AUTO-ENTMCNC: 30.8 G/DL (ref 31.4–37.4)
MCV RBC AUTO: 84 FL (ref 82–98)
MONOCYTES # BLD AUTO: 0.95 THOUSAND/ÂΜL (ref 0.17–1.22)
MONOCYTES NFR BLD AUTO: 11 % (ref 4–12)
NEUTROPHILS # BLD AUTO: 4.65 THOUSANDS/ÂΜL (ref 1.85–7.62)
NEUTS SEG NFR BLD AUTO: 56 % (ref 43–75)
NRBC BLD AUTO-RTO: 0 /100 WBCS
PLATELET # BLD AUTO: 211 THOUSANDS/UL (ref 149–390)
PMV BLD AUTO: 9.8 FL (ref 8.9–12.7)
POTASSIUM SERPL-SCNC: 3.9 MMOL/L (ref 3.5–5.3)
RBC # BLD AUTO: 3.63 MILLION/UL (ref 3.81–5.12)
SODIUM SERPL-SCNC: 139 MMOL/L (ref 135–147)
WBC # BLD AUTO: 8.39 THOUSAND/UL (ref 4.31–10.16)

## 2025-05-05 PROCEDURE — NC001 PR NO CHARGE: Performed by: ORTHOPAEDIC SURGERY

## 2025-05-05 PROCEDURE — 97530 THERAPEUTIC ACTIVITIES: CPT

## 2025-05-05 PROCEDURE — 83735 ASSAY OF MAGNESIUM: CPT | Performed by: NURSE PRACTITIONER

## 2025-05-05 PROCEDURE — 85025 COMPLETE CBC W/AUTO DIFF WBC: CPT | Performed by: NURSE PRACTITIONER

## 2025-05-05 PROCEDURE — 99232 SBSQ HOSP IP/OBS MODERATE 35: CPT | Performed by: PHYSICIAN ASSISTANT

## 2025-05-05 PROCEDURE — 97535 SELF CARE MNGMENT TRAINING: CPT

## 2025-05-05 PROCEDURE — 80048 BASIC METABOLIC PNL TOTAL CA: CPT | Performed by: NURSE PRACTITIONER

## 2025-05-05 PROCEDURE — 82948 REAGENT STRIP/BLOOD GLUCOSE: CPT

## 2025-05-05 PROCEDURE — 97116 GAIT TRAINING THERAPY: CPT

## 2025-05-05 RX ORDER — POTASSIUM CHLORIDE 1500 MG/1
20 TABLET, EXTENDED RELEASE ORAL DAILY
Status: CANCELLED | OUTPATIENT
Start: 2025-05-05

## 2025-05-05 RX ORDER — CLOPIDOGREL BISULFATE 75 MG/1
75 TABLET ORAL DAILY
Status: DISCONTINUED | OUTPATIENT
Start: 2025-05-05 | End: 2025-05-06 | Stop reason: HOSPADM

## 2025-05-05 RX ORDER — FUROSEMIDE 40 MG/1
40 TABLET ORAL
Status: CANCELLED | OUTPATIENT
Start: 2025-05-05

## 2025-05-05 RX ORDER — ACETAMINOPHEN 325 MG/1
650 TABLET ORAL ONCE
Status: COMPLETED | OUTPATIENT
Start: 2025-05-05 | End: 2025-05-05

## 2025-05-05 RX ORDER — FUROSEMIDE 40 MG/1
40 TABLET ORAL
Status: DISCONTINUED | OUTPATIENT
Start: 2025-05-05 | End: 2025-05-06

## 2025-05-05 RX ORDER — FUROSEMIDE 40 MG/1
40 TABLET ORAL ONCE
Status: COMPLETED | OUTPATIENT
Start: 2025-05-05 | End: 2025-05-05

## 2025-05-05 RX ADMIN — ISOSORBIDE MONONITRATE 30 MG: 30 TABLET, EXTENDED RELEASE ORAL at 20:36

## 2025-05-05 RX ADMIN — FUROSEMIDE 40 MG: 40 TABLET ORAL at 16:48

## 2025-05-05 RX ADMIN — OXYCODONE HYDROCHLORIDE 5 MG: 5 TABLET ORAL at 17:01

## 2025-05-05 RX ADMIN — ACETAMINOPHEN 650 MG: 325 TABLET, FILM COATED ORAL at 23:34

## 2025-05-05 RX ADMIN — INSULIN LISPRO 1 UNITS: 100 INJECTION, SOLUTION INTRAVENOUS; SUBCUTANEOUS at 11:31

## 2025-05-05 RX ADMIN — CLOPIDOGREL BISULFATE 75 MG: 75 TABLET, FILM COATED ORAL at 11:28

## 2025-05-05 RX ADMIN — INSULIN LISPRO 1 UNITS: 100 INJECTION, SOLUTION INTRAVENOUS; SUBCUTANEOUS at 16:49

## 2025-05-05 RX ADMIN — FUROSEMIDE 40 MG: 40 TABLET ORAL at 09:08

## 2025-05-05 RX ADMIN — ACETAMINOPHEN 650 MG: 325 TABLET, FILM COATED ORAL at 16:50

## 2025-05-05 RX ADMIN — HEPARIN SODIUM 5000 UNITS: 5000 INJECTION INTRAVENOUS; SUBCUTANEOUS at 09:04

## 2025-05-05 RX ADMIN — ALUMINUM HYDROXIDE, MAGNESIUM HYDROXIDE, AND SIMETHICONE 30 ML: 200; 200; 20 SUSPENSION ORAL at 09:12

## 2025-05-05 RX ADMIN — PANTOPRAZOLE SODIUM 40 MG: 40 TABLET, DELAYED RELEASE ORAL at 09:08

## 2025-05-05 RX ADMIN — DOFETILIDE 125 MCG: 0.12 CAPSULE ORAL at 20:36

## 2025-05-05 RX ADMIN — OXYCODONE HYDROCHLORIDE 5 MG: 5 TABLET ORAL at 21:44

## 2025-05-05 RX ADMIN — PREGABALIN 75 MG: 75 CAPSULE ORAL at 09:03

## 2025-05-05 RX ADMIN — FLUTICASONE PROPIONATE 1 SPRAY: 50 SPRAY, METERED NASAL at 09:06

## 2025-05-05 RX ADMIN — PREGABALIN 75 MG: 75 CAPSULE ORAL at 20:36

## 2025-05-05 RX ADMIN — ACETAMINOPHEN 650 MG: 325 TABLET, FILM COATED ORAL at 11:27

## 2025-05-05 RX ADMIN — DOFETILIDE 125 MCG: 0.12 CAPSULE ORAL at 09:03

## 2025-05-05 RX ADMIN — OXYCODONE HYDROCHLORIDE 5 MG: 5 TABLET ORAL at 02:35

## 2025-05-05 RX ADMIN — INSULIN LISPRO 1 UNITS: 100 INJECTION, SOLUTION INTRAVENOUS; SUBCUTANEOUS at 21:46

## 2025-05-05 RX ADMIN — DOCUSATE SODIUM 100 MG: 100 CAPSULE, LIQUID FILLED ORAL at 09:05

## 2025-05-05 RX ADMIN — SENNOSIDES 8.6 MG: 8.6 TABLET, FILM COATED ORAL at 09:05

## 2025-05-05 RX ADMIN — HEPARIN SODIUM 5000 UNITS: 5000 INJECTION INTRAVENOUS; SUBCUTANEOUS at 20:37

## 2025-05-05 RX ADMIN — ACETAMINOPHEN 650 MG: 325 TABLET, FILM COATED ORAL at 05:13

## 2025-05-05 RX ADMIN — PANTOPRAZOLE SODIUM 40 MG: 40 TABLET, DELAYED RELEASE ORAL at 16:48

## 2025-05-05 RX ADMIN — ATORVASTATIN CALCIUM 80 MG: 80 TABLET, FILM COATED ORAL at 16:49

## 2025-05-05 RX ADMIN — LACTULOSE 30 G: 20 SOLUTION ORAL at 20:41

## 2025-05-05 RX ADMIN — DOCUSATE SODIUM 100 MG: 100 CAPSULE, LIQUID FILLED ORAL at 16:49

## 2025-05-05 RX ADMIN — PREGABALIN 75 MG: 75 CAPSULE ORAL at 16:48

## 2025-05-05 RX ADMIN — OXYCODONE HYDROCHLORIDE 5 MG: 5 TABLET ORAL at 09:14

## 2025-05-05 RX ADMIN — LEVOTHYROXINE SODIUM 75 MCG: 75 TABLET ORAL at 05:13

## 2025-05-05 NOTE — OCCUPATIONAL THERAPY NOTE
Occupational Therapy Progress Note     Patient Name: Vesna Langston  Today's Date: 5/5/2025  Problem List  Principal Problem:    Status post lumbar spinal fusion  Active Problems:    Atrial flutter, paroxysmal (HCC)    HFrEF (heart failure with reduced ejection fraction) (HCC)    Coronary artery disease involving native coronary artery of native heart    Type 2 diabetes mellitus, without long-term current use of insulin (HCC)    Acquired hypothyroidism    Class 1 obesity due to excess calories with serious comorbidity and body mass index (BMI) of 34.0 to 34.9 in adult    Other pulmonary embolism without acute cor pulmonale, unspecified chronicity (HCC)    Leukocytosis    Anemia           \   05/05/25 0938   OT Last Visit   OT Visit Date 05/05/25   Note Type   Note Type Treatment   Pain Assessment   Pain Assessment Tool 0-10   Pain Score 3   Pain Location/Orientation Location: Back;Orientation: Lower   Pain Onset/Description Onset: Ongoing   Effect of Pain on Daily Activities Impacts ability to engage in valued occupations   Patient's Stated Pain Goal No pain   Hospital Pain Intervention(s) Repositioned;Ambulation/increased activity   Pain Rating: FLACC (Rest) - Face 1   Pain Rating: FLACC (Rest) - Legs 0   Pain Rating: FLACC (Rest) - Activity 0   Pain Rating: FLACC (Rest) - Cry 0   Pain Rating: FLACC (Rest) - Consolability 0   Score: FLACC (Rest) 1   Pain Rating: FLACC (Activity) - Face 1   Pain Rating: FLACC (Activity) - Legs 0   Pain Rating: FLACC (Activity) - Activity 0   Pain Rating: FLACC (Activity) - Cry 0   Pain Rating: FLACC (Activity) - Consolability 0   Score: FLACC (Activity) 1   Restrictions/Precautions   Weight Bearing Precautions Per Order Yes   RUE Weight Bearing Per Order WBAT   LUE Weight Bearing Per Order WBAT   RLE Weight Bearing Per Order WBAT   LLE Weight Bearing Per Order WBAT   Braces or Orthoses (S)  LSO  (LSO on for all OOB activities.)   Other Precautions Chair Alarm;Bed Alarm;Fall  Risk;Spinal precautions;Telemetry   Lifestyle   Autonomy PTA, pt was independent in ADLs, has some assistance with IADLs, and uses rollator vs SPC for functional mobility; (+) driving   Reciprocal Relationships Lives alone; supportive daughter and sons however all live approx 1 hour away   Service to Others Reports that she was injured at [facility] where she works   Intrinsic Gratification Enjoys reading and crotcheting   ADL   Grooming Assistance 5  Supervision/Setup   Grooming Deficit Setup;Increased time to complete;Brushing hair   Grooming Comments Pt completes grooming w/ set up seated in bedside chair.   UB Bathing Assistance 4  Minimal Assistance   UB Bathing Deficit Setup;Supervision/safety;Increased time to complete;Chest;Right arm;Left arm;Abdomen;Perineal area   UB Bathing Comments Pt performs UB bathing w/ min a seated in bedside chair.   LB Bathing Assistance 3  Moderate Assistance   LB Bathing Deficit Setup;Increased time to complete;Right lower leg including foot;Left lower leg including foot   LB Bathing Comments Pt requires mod a for LB bathing seated in bedside chair this date.   UB Dressing Assistance 5  Supervision/Setup   UB Dressing Deficit Setup;Increased time to complete;Thread RUE;Thread LUE   UB Dressing Comments Pt requires set up to don hospital gown seated in bedside chair this date.   LB Dressing Assistance 3  Moderate Assistance   LB Dressing Deficit Setup;Supervision/safety;Increased time to complete;Don/doff R sock;Don/doff L sock;Pull up over hips;Thread RLE into underwear;Thread LLE into underwear   LB Dressing Comments Pt requires min -mod a for LB dressing. Pt requires min a for donning underwear and mod a for donning and doffing socks this date.   Toileting Assistance  4  Minimal Assistance  (CGA due to low toilet seat in bathroom)   Toileting Deficit Steadying;Verbal cueing;Supervison/safety;Increased time to complete   Toileting Comments Pt requires min a (CGA) for STS from  "standing w/ walker to toilet.   Functional Standing Tolerance   Time ~10 minutes   Activity Functional mobility   Comments Pt performs community distance w/ CGA and RW.   Bed Mobility   Supine to Sit 5  Supervision   Additional items Increased time required;Verbal cues   Additional Comments Pt greeted supine in bed upon arrival.   Transfers   Sit to Stand 4  Minimal assistance   Additional items Assist x 1;Increased time required;Verbal cues  (First trial of STS pt requires min a and progressed to CGA this date.)   Stand to Sit 5  Supervision   Additional items Increased time required;Verbal cues   Toilet transfer 4  Minimal assistance   Additional items Assist x 1;Increased time required;Verbal cues;Standard toilet  (CGA)   Additional Comments x6 w/ RW   Functional Mobility   Functional Mobility 4  Minimal assistance   Additional Comments Pt completed community distance w/ CGA and RW this date. Pt traveled down hallway to steps and back w/ one seated rest break in between.   Additional items Rolling walker   Toilet Transfers   Toilet Transfer From Rolling walker   Toilet Transfer Type To and from   Toilet Transfer to Standard toilet   Toilet Transfer Technique Stand pivot   Toilet Transfers Contact guard   Toilet Transfers Comments Use of bathroom grab bars   Subjective   Subjective \"My daughter and I agree, I won't go home until I have had rehab. I have a three story home\"   Cognition   Overall Cognitive Status WFL   Arousal/Participation Alert;Responsive;Arousable;Cooperative   Attention Within functional limits   Orientation Level Oriented X4   Memory Within functional limits   Following Commands Follows all commands and directions without difficulty   Comments Pt pleasant and cooperative this date. Recalls all precautions and has insight into deficits.   Vision   Vision Comments Wears glasses   Activity Tolerance   Activity Tolerance Patient tolerated treatment well   Medical Staff Made Aware RN made aware of " tx this date.   Assessment   Assessment Pt seen for OT treatment session on this date focused on ADL retraining, functional transfers and mobility, energy conservation, functional endurance, recall of safety precautions, and patient/caregiver education. Pt was greeted supine in bed upon arrival and cooperative throughout session. Pt requires SUP for bed mobility, grooming, and UB dressing this date. Pt performs STS intially w/ min a x1 from bed and progresses to CGA w/ RW. Ambulates community distance down hallway w/ one seated rest break in bedside chair, performs steps, and returns back to room for ADLs. Performs grooming, bathing, and dressing seated in bedside chair this date. Requires min a for donning underwear and mod a for donning and doffing socks. Following session, pt was left in chair with chair alarm on and all needs within reach. Pt continues to be limited by functional status related to ADLs and transfers, although demonstrates improvements in functional mobility. The patient's raw score on the -PAC Daily Activity Inpatient Short Form is 19. A raw score ofgreater than or equal to 19 suggests the patient may benefit from discharge to home. Please refer to the recommendation of the Occupational Therapist for safe discharge planning. Pt would benefit from continued acute OT intervention with plan to continue OT treatment sessions 2-3x per week. Pt is uncomfortable discharging home alone without rehab at this time. Pt reports frequent falls around her home PTA and states she will not return home without attending a rehab facility. Recommend d/c to level one services.   Plan   Treatment Interventions ADL retraining;Functional transfer training;Endurance training;Equipment evaluation/education;Patient/family training;Compensatory technique education;Continued evaluation;Energy conservation;Activityengagement   Goal Expiration Date 05/16/25   OT Treatment Day 2   OT Frequency 2-3x/wk   Discharge  Recommendation   Rehab Resource Intensity Level, OT I (Maximum Resource Intensity)   AM-PAC Daily Activity Inpatient   Lower Body Dressing 2   Bathing 3   Toileting 3   Upper Body Dressing 3   Grooming 4   Eating 4   Daily Activity Raw Score 19   Daily Activity Standardized Score (Calc for Raw Score >=11) 40.22   AM-PAC Applied Cognition Inpatient   Following a Speech/Presentation 4   Understanding Ordinary Conversation 4   Taking Medications 4   Remembering Where Things Are Placed or Put Away 4   Remembering List of 4-5 Errands 4   Taking Care of Complicated Tasks 3   Applied Cognition Raw Score 23   Applied Cognition Standardized Score 53.08   End of Consult   Education Provided Yes   Patient Position at End of Consult Bedside chair;Bed/Chair alarm activated;All needs within reach   Nurse Communication Nurse aware of consult   End of Consult Comments Pt left seated in bedside chair w/ alarm on and all needs within reach.           Sofya Cutler, JAMARCUS, OTR/L

## 2025-05-05 NOTE — ASSESSMENT & PLAN NOTE
Status post Bilateral Navigated revision L3-S1 laminectomy/decompression, revision L3-S1 posterior instrumented spinal fusion with TLIF on 5/1/2025 doing well postoperatively.     WBAT BLLE  PT/OT  Incentive spirometry  Pain control  DVT ppx: home eliquis and plavix  Diet: cardiovascular and carb controlled  Must wear LSO brace when out of bed  Lumbar spine precautions  Monitor drain output -- anticipate drain pull today  Monitor for acute blood loss anemia and administer or recommend IVF/prbc as indicated for greater than 2 gram Hgb drop or Hgb < 7  Appreciate medicine team for medical comanagement  Dispo planning

## 2025-05-05 NOTE — ARC ADMISSION
[facility]  met with patient at bedside. Reviewed [facility] program, acute rehab criteria and approval process, authorization process, as well as [facility] locations and preferences. Patient's preferred [facility] location is [facility] and second choice is  [facility].  [facility] Rehab folder left with patient and all questions answered. Patient was made aware that [facility] Reviewer will keep their  updated regarding referral status.

## 2025-05-05 NOTE — ASSESSMENT & PLAN NOTE
Recent Labs     05/03/25  0446 05/04/25  0459 05/05/25  0434   WBC 10.18* 11.41* 8.39     Likely reactive in the setting of recent procedure  Remains afebrile; no s/sx of infxn

## 2025-05-05 NOTE — ASSESSMENT & PLAN NOTE
S/p NATHAN in 03/2024  C/w PTA Toprol-XL 50 mg qhs, Imdur 30 mg BID, Lipitor 80 mg qhs   PTA 75 mg q day resumed on 05/05

## 2025-05-05 NOTE — PLAN OF CARE
Problem: PHYSICAL THERAPY ADULT  Goal: Performs mobility at highest level of function for planned discharge setting.  See evaluation for individualized goals.  Description: Treatment/Interventions: ADL retraining, LE strengthening/ROM, Functional transfer training, Elevations, Therapeutic exercise, Endurance training, Patient/family training, Bed mobility, Gait training, Spoke to nursing, Spoke to case management, OT  Equipment Recommended: Walker       See flowsheet documentation for full assessment, interventions and recommendations.  Outcome: Progressing  Note: Prognosis: Good  Problem List: Decreased strength, Decreased endurance, Decreased mobility, Pain, Orthopedic restrictions  Assessment: Pt pleasant and agreeable to participate in PT session. Completes mobility and therapeutic activity as outlined above. LSO donned EOB prior to mobilization. Pt able to improve ambulation distance with seated rest breaks as needed. Attempts to complete stair trial, pt able to negotiate 2 steps however limited by discomfort and fatigue. Pt able to recall spinal precautions. Pt requires CGA during mobility with cues for safety. Pt progressing towards her goals and will continue to benefit from skilled acute PT services to address deficits and promote mobility. Pt is very motivated to improve mobility and lives alone in 3  with multiple FFOS. Pt left upright in chair with chair alarm donned, LSO in place, call bell and personal items within reach and all needs met.  Barriers to Discharge: Inaccessible home environment, Decreased caregiver support     Rehab Resource Intensity Level, PT: I (Maximum Resource Intensity)    See flowsheet documentation for full assessment.

## 2025-05-05 NOTE — PROGRESS NOTES
Progress Note - Hospitalist   Name: Vesna Langston 65 y.o. female I MRN: [id number]  Unit/Bed#: -01 I Date of Admission: 5/1/2025   Date of Service: 5/5/2025 I Hospital Day: 4    Assessment & Plan  Status post lumbar spinal fusion  Presented w/ back pain s/p B/L navigated revision L3-S1 laminectomy/decompression, revision L3-S1 posterior instrumented spinal fusion with TLIF on 05/1/25  Management per orthopedic surgery  WBAT BLLE  PT/OT eval  LSO brace OOB  Remove DARWIN drain when appropriate per orthopedics  C/w analgesia  PT/OT following  HFrEF (heart failure with reduced ejection fraction) ([facility])  Wt Readings from Last 3 Encounters:   05/04/25 102 kg (224 lb 10.4 oz)   04/24/25 94.3 kg (208 lb)   04/23/25 94.8 kg (209 lb)     Clinically, mild hypervolemia w/ +1 ankle edema & objective abdominal distention; no respiratory distress or PND   Home regimen: Jardiance 25 mg q day, Entresto 49-51 mg BID, PRN Lasix 40 to 80 mg   PTA Entresto on hold w/ soft BP's  PTA Jardiance 25 mg q day, resume at d/c   Order Lasix 40 mg PO BID x 3 doses   C/w daily wts   Sodium restrictive diet   Anemia  Recent Labs     05/03/25  0446 05/04/25  0459 05/05/25  0434   HGB 9.4* 9.5* 9.4*     Baseline Hgb 11-12   Etiology multifactorial in setting of ABLA w/ recent surgery & dilutional component w/ IVFs   Trend CBC  Continue monitoring DARWIN drain output   Coronary artery disease involving native coronary artery of native heart  S/p NATHAN in 03/2024  C/w PTA Toprol-XL 50 mg qhs, Imdur 30 mg BID, Lipitor 80 mg qhs   PTA 75 mg q day resumed on 05/05   Other pulmonary embolism without acute cor pulmonale, unspecified chronicity (HCC)  C/w SQ heparin   Resume Eliquis on d/c per orthopedics   Atrial flutter, paroxysmal (HCC)  C/w Toprol XL & Tikosyn 125 mcg BID - rate controlled   Resume Eliquis on d/c per orthopedics   Acquired hypothyroidism  C/w levothyroxine  TSH therapeutic in 04/2025  Class 1 obesity due to excess calories with serious  comorbidity and body mass index (BMI) of 34.0 to 34.9 in adult  Affects all aspects of life  Encourage lifestyle/dietary modifications  Type 2 diabetes mellitus, without long-term current use of insulin ([facility])  Lab Results   Component Value Date    HGBA1C 5.9 (H) 03/28/2025     Recent Labs     05/04/25  1030 05/04/25  1525 05/04/25  2106 05/05/25  0736   POCGLU 127 142* 156* 111     Blood Sugar Average: Last 72 hrs:  (P) 141.25  A1c at goal   Home regimen: Jardiance, Ozempic - severe reflux symptoms - on hold, resume at d/c  C/w SSI AC/QHS & Accu-Cheks  CCD  Leukocytosis  Recent Labs     05/03/25  0446 05/04/25  0459 05/05/25  0434   WBC 10.18* 11.41* 8.39     Likely reactive in the setting of recent procedure  Remains afebrile; no s/sx of infxn      VTE Pharmacologic Prophylaxis:   Moderate Risk (Score 3-4) - Pharmacological DVT Prophylaxis Ordered: heparin.    Mobility:   Basic Mobility Inpatient Raw Score: 20  JH-HLM Goal: 6: Walk 10 steps or more  JH-HLM Achieved: 6: Walk 10 steps or more  JH-HLM Goal NOT achieved. Continue with multidisciplinary rounding and encourage appropriate mobility to improve upon JH-HLM goals.    Patient Centered Rounds: I performed bedside rounds with nursing staff today.   Discussions with Specialists or Other Care Team Provider: Plan of care reviewed with orthopedics    Education and Discussions with Family / Patient:  Per primary team.     Current Length of Stay: 4 day(s)  Current Patient Status: Inpatient   Certification Statement:  Per primary team  Discharge Plan: [facility] is following this patient on consult. They are medically stable for discharge when deemed appropriate by primary service.    Code Status: Prior    Subjective   Patient admits to abdominal distention and lower extremity edema.  She is without orthopnea, chest pain, shortness of breath, dyspnea on exertion, or productive cough.  She does note discomfort following session with PT.    Objective :  Temp:  [97.7 °F (36.5  °C)-99.5 °F (37.5 °C)] 97.7 °F (36.5 °C)  HR:  [84-99] 92  BP: (105-112)/(54-73) 111/73  Resp:  [16-18] 16  SpO2:  [92 %-98 %] 95 %  O2 Device: None (Room air)  Nasal Cannula O2 Flow Rate (L/min):  [3 L/min] 3 L/min    Body mass index is 34.16 kg/m².     Input and Output Summary (last 24 hours):     Intake/Output Summary (Last 24 hours) at 5/5/2025 1026  Last data filed at 5/5/2025 0941  Gross per 24 hour   Intake 420 ml   Output 1255 ml   Net -835 ml       Physical Exam  Constitutional:       General: She is not in acute distress.     Appearance: She is obese. She is not toxic-appearing.   HENT:      Head: Normocephalic.      Right Ear: External ear normal.      Left Ear: External ear normal.      Nose: Nose normal.      Mouth/Throat:      Mouth: Mucous membranes are moist.      Pharynx: Oropharynx is clear.   Eyes:      Extraocular Movements: Extraocular movements intact.      Conjunctiva/sclera: Conjunctivae normal.   Cardiovascular:      Rate and Rhythm: Normal rate and regular rhythm.      Pulses: Normal pulses.      Heart sounds: Normal heart sounds.   Pulmonary:      Effort: Pulmonary effort is normal. No respiratory distress.      Breath sounds: Normal breath sounds. No wheezing, rhonchi or rales.   Abdominal:      General: Bowel sounds are normal. There is no distension.      Palpations: Abdomen is soft.      Tenderness: There is no abdominal tenderness. There is no guarding or rebound.      Comments: Obese abdomen, no notable distention   Musculoskeletal:         General: Swelling (Trace lower extremity edema without pitting) present.      Cervical back: Normal range of motion.      Comments: Back brace present   Skin:     General: Skin is warm and dry.      Coloration: Skin is not jaundiced.      Findings: No bruising or lesion.   Neurological:      General: No focal deficit present.      Mental Status: She is alert and oriented to person, place, and time. Mental status is at baseline.   Psychiatric:          Mood and Affect: Mood normal.         Behavior: Behavior normal.           Lines/Drains:  Lines/Drains/Airways       Active Status       Name Placement date Placement time Site Days    Closed/Suction Drain Left Back Bulb 19 Fr. 05/01/25  1615  Back  3                            Lab Results: I have reviewed the following results:   Results from last 7 days   Lab Units 05/05/25  0434   WBC Thousand/uL 8.39   HEMOGLOBIN g/dL 9.4*   HEMATOCRIT % 30.5*   PLATELETS Thousands/uL 211   SEGS PCT % 56   LYMPHO PCT % 28   MONO PCT % 11   EOS PCT % 4     Results from last 7 days   Lab Units 05/05/25  0434   SODIUM mmol/L 139   POTASSIUM mmol/L 3.9   CHLORIDE mmol/L 103   CO2 mmol/L 25   BUN mg/dL 10   CREATININE mg/dL 0.56*   ANION GAP mmol/L 11   CALCIUM mg/dL 8.6   GLUCOSE RANDOM mg/dL 117         Results from last 7 days   Lab Units 05/05/25  0736 05/04/25  2106 05/04/25  1525 05/04/25  1030 05/04/25  0611 05/03/25  2056 05/03/25  1549 05/03/25  1050 05/03/25  0551 05/02/25  2109 05/02/25  1547 05/02/25  1052   POC GLUCOSE mg/dl 111 156* 142* 127 121 154* 139 126 113 142* 162* 202*               Recent Cultures (last 7 days):         Imaging Results Review: No pertinent imaging studies reviewed.  Other Study Results Review: No additional pertinent studies reviewed.    Last 24 Hours Medication List:     Current Facility-Administered Medications:     acetaminophen (TYLENOL) tablet 650 mg, Q6H MONA    acetaminophen (TYLENOL) tablet 650 mg, Once    aluminum-magnesium hydroxide-simethicone (MAALOX) oral suspension 30 mL, Q6H PRN    atorvastatin (LIPITOR) tablet 80 mg, QPM    calcium carbonate (TUMS) chewable tablet 1,000 mg, Daily PRN    docusate sodium (COLACE) capsule 100 mg, BID    dofetilide (TIKOSYN) capsule 125 mcg, Q12H MONA    fluticasone (FLONASE) 50 mcg/act nasal spray 1 spray, Daily    furosemide (LASIX) tablet 40 mg, BID (diuretic)    heparin (porcine) subcutaneous injection 5,000 Units, Q12H MONA    insulin lispro  (HumALOG/ADMELOG) 100 units/mL subcutaneous injection 1-5 Units, HS    insulin lispro (HumALOG/ADMELOG) 100 units/mL subcutaneous injection 1-6 Units, TID AC **AND** Fingerstick Glucose (POCT), TID AC    isosorbide mononitrate (IMDUR) 24 hr tablet 30 mg, BID    lactulose (CHRONULAC) oral solution 30 g, Daily PRN    levothyroxine tablet 75 mcg, Daily    metoprolol succinate (TOPROL-XL) 24 hr tablet 50 mg, HS    ondansetron (ZOFRAN) injection 4 mg, Q6H PRN    oxyCODONE (ROXICODONE) immediate release tablet 10 mg, Q4H PRN    oxyCODONE (ROXICODONE) IR tablet 5 mg, Q4H PRN    pantoprazole (PROTONIX) EC tablet 40 mg, BID AC    pregabalin (LYRICA) capsule 75 mg, TID    senna (SENOKOT) tablet 8.6 mg, Daily    Administrative Statements   Today, Patient Was Seen By: Mary Jo Elias PA-C  I have spent a total time of 45 minutes in caring for this patient on the day of the visit/encounter including Diagnostic results, Prognosis, Risks and benefits of tx options, Instructions for management, Patient and family education, Impressions, Counseling / Coordination of care, Documenting in the medical record, Reviewing/placing orders in the medical record (including tests, medications, and/or procedures), Obtaining or reviewing history  , and Communicating with other healthcare professionals .    **Please Note: This note may have been constructed using a voice recognition system.**

## 2025-05-05 NOTE — ASSESSMENT & PLAN NOTE
Presented w/ back pain s/p B/L navigated revision L3-S1 laminectomy/decompression, revision L3-S1 posterior instrumented spinal fusion with TLIF on 05/1/25  Management per orthopedic surgery  WBAT BLLE  PT/OT eval  LSO brace OOB  Remove DARWIN drain when appropriate per orthopedics  C/w analgesia  PT/OT following

## 2025-05-05 NOTE — PHYSICAL THERAPY NOTE
PHYSICAL THERAPY NOTE          Patient Name: Vesna Langston  Today's Date: 5/5/2025 05/05/25 1003   PT Last Visit   PT Visit Date 05/05/25   Note Type   Note Type Treatment   Pain Assessment   Pain Assessment Tool 0-10   Pain Score 4   Pain Location/Orientation Location: Back   Pain Onset/Description Onset: Ongoing   Effect of Pain on Daily Activities limits mobility   Patient's Stated Pain Goal No pain   Hospital Pain Intervention(s) Repositioned;Ambulation/increased activity;Emotional support   Restrictions/Precautions   Weight Bearing Precautions Per Order Yes   RUE Weight Bearing Per Order WBAT   LUE Weight Bearing Per Order WBAT   RLE Weight Bearing Per Order WBAT   LLE Weight Bearing Per Order WBAT   Braces or Orthoses (S)  LSO   Other Precautions Chair Alarm;Bed Alarm;Multiple lines;Pain;Fall Risk;Spinal precautions   General   Chart Reviewed Yes   Response to Previous Treatment Patient with no complaints from previous session.   Family/Caregiver Present No   Cognition   Overall Cognitive Status WFL   Arousal/Participation Alert;Responsive;Cooperative   Attention Within functional limits   Orientation Level Oriented X4   Memory Within functional limits   Following Commands Follows all commands and directions without difficulty   Comments pt pleasant and cooperative   Subjective   Subjective pt agreeable to mobilize   Bed Mobility   Rolling L 5  Supervision   Supine to Sit 5  Supervision   Additional items HOB elevated;Increased time required   Sit to Supine Unable to assess   Additional Comments pt OOB in chair at end of session   Transfers   Sit to Stand 4  Minimal assistance  (CGA)   Additional items Assist x 1;Armrests;Increased time required   Stand to Sit 5  Supervision   Additional items Assist x 1;Increased time required;Verbal cues   Toilet transfer 5  Supervision   Additional items Increased time required;Verbal  cues;Standard toilet   Additional Comments c RW. x3 STS   Ambulation/Elevation   Gait pattern Decreased foot clearance;Inconsistent kana;L Foot drag;Short stride;Excessively slow  (h/o L foot drag)   Gait Assistance 4  Minimal assist  (CGA +chair follow)   Additional items Assist x 1;Verbal cues   Assistive Device Rolling walker   Distance 120'+20'+20'+120'+10'   Stair Management Assistance 4  Minimal assist   Additional items Assist x 1;Verbal cues;Increased time required   Stair Management Technique One rail R;Step to pattern   Number of Stairs 2   Ambulation/Elevation Additional Comments seated rest breaks throughout   Balance   Static Sitting Good   Dynamic Sitting Fair +   Static Standing Fair   Dynamic Standing Fair -   Ambulatory Poor +   Endurance Deficit   Endurance Deficit Yes   Endurance Deficit Description pt limited by pain, fatigue and generalized weakness   Activity Tolerance   Activity Tolerance Patient limited by fatigue;Patient limited by pain   Medical Staff Made Aware OT Sofya   Nurse Made Aware yes-RN cleared   Assessment   Prognosis Good   Problem List Decreased strength;Decreased endurance;Decreased mobility;Pain;Orthopedic restrictions   Assessment Pt pleasant and agreeable to participate in PT session. Completes mobility and therapeutic activity as outlined above. LSO donned EOB prior to mobilization. Pt able to improve ambulation distance with seated rest breaks as needed. Attempts to complete stair trial, pt able to negotiate 2 steps however limited by discomfort and fatigue. Pt able to recall spinal precautions. Pt requires CGA during mobility with cues for safety. Pt progressing towards her goals and will continue to benefit from skilled acute PT services to address deficits and promote mobility. Pt is very motivated to improve mobility and lives alone in 3  with multiple FFOS. Pt left upright in chair with chair alarm donned, LSO in place, call bell and personal items within reach  and all needs met.   Barriers to Discharge Inaccessible home environment;Decreased caregiver support   Goals   Patient Goals to get better   STG Expiration Date 05/16/25   PT Treatment Day 1   Plan   Treatment/Interventions ADL retraining;Functional transfer training;Therapeutic exercise;LE strengthening/ROM;Elevations;Endurance training;Patient/family training;Bed mobility;Equipment eval/education;Gait training;Spoke to nursing;OT   Progress Progressing toward goals   PT Frequency 3-5x/wk   Discharge Recommendation   Rehab Resource Intensity Level, PT I (Maximum Resource Intensity)   Equipment Recommended Walker   Walker Package Recommended Wheeled walker   AM-PAC Basic Mobility Inpatient   Turning in Flat Bed Without Bedrails 3   Lying on Back to Sitting on Edge of Flat Bed Without Bedrails 3   Moving Bed to Chair 3   Standing Up From Chair Using Arms 3   Walk in Room 3   Climb 3-5 Stairs With Railing 3   Basic Mobility Inpatient Raw Score 18   Basic Mobility Standardized Score 41.05   [facility] Highest Level Of Mobility   JH-HLM Goal 6: Walk 10 steps or more   JH-HLM Achieved 7: Walk 25 feet or more   Education   Education Provided Mobility training;Home exercise program;Assistive device   Patient Demonstrates acceptance/verbal understanding   End of Consult   Patient Position at End of Consult Bedside chair;Bed/Chair alarm activated;All needs within reach   Olivia Batista DPT

## 2025-05-05 NOTE — ASSESSMENT & PLAN NOTE
Wt Readings from Last 3 Encounters:   05/04/25 102 kg (224 lb 10.4 oz)   04/24/25 94.3 kg (208 lb)   04/23/25 94.8 kg (209 lb)     Clinically, mild hypervolemia w/ +1 ankle edema & objective abdominal distention; no respiratory distress or PND   Home regimen: Jardiance 25 mg q day, Entresto 49-51 mg BID, PRN Lasix 40 to 80 mg   PTA Entresto on hold w/ soft BP's  PTA Jardiance 25 mg q day, resume at d/c   Order Lasix 40 mg PO BID x 3 doses   C/w daily wts   Sodium restrictive diet

## 2025-05-05 NOTE — ASSESSMENT & PLAN NOTE
Recent Labs     05/03/25  0446 05/04/25  0459 05/05/25  0434   HGB 9.4* 9.5* 9.4*     Baseline Hgb 11-12   Etiology multifactorial in setting of ABLA w/ recent surgery & dilutional component w/ IVFs   Trend CBC  Continue monitoring DARWIN drain output

## 2025-05-05 NOTE — ASSESSMENT & PLAN NOTE
Lab Results   Component Value Date    HGBA1C 5.9 (H) 03/28/2025     Recent Labs     05/04/25  1030 05/04/25  1525 05/04/25  2106 05/05/25  0736   POCGLU 127 142* 156* 111     Blood Sugar Average: Last 72 hrs:  (P) 141.25  A1c at goal   Home regimen: Jardiance, Ozempic - severe reflux symptoms - on hold, resume at d/c  C/w [facility] AC/QHS & Accu-Cheks  CCD

## 2025-05-05 NOTE — PLAN OF CARE
Problem: PAIN - ADULT  Goal: Verbalizes/displays adequate comfort level or baseline comfort level  Description: Interventions:- Encourage patient to monitor pain and request assistance- Assess pain using appropriate pain scale- Administer analgesics based on type and severity of pain and evaluate response- Implement non-pharmacological measures as appropriate and evaluate response- Consider cultural and social influences on pain and pain management- Notify physician/advanced practitioner if interventions unsuccessful or patient reports new pain  Outcome: Progressing     Problem: DISCHARGE PLANNING  Goal: Discharge to home or other facility with appropriate resources  Description: INTERVENTIONS:- Identify barriers to discharge w/patient and caregiver- Arrange for needed discharge resources and transportation as appropriate- Identify discharge learning needs (meds, wound care, etc.)- Arrange for interpretive services to assist at discharge as needed- Refer to Case Management Department for coordinating discharge planning if the patient needs post-hospital services based on physician/advanced practitioner order or complex needs related to functional status, cognitive ability, or social support system  Outcome: Progressing     Problem: Knowledge Deficit  Goal: Patient/family/caregiver demonstrates understanding of disease process, treatment plan, medications, and discharge instructions  Description: Complete learning assessment and assess knowledge base.Interventions:- Provide teaching at level of understanding- Provide teaching via preferred learning methods  Outcome: Progressing     Problem: SAFETY ADULT  Goal: Patient will remain free of falls  Description: INTERVENTIONS:- Educate patient/family on patient safety including physical limitations- Instruct patient to call for assistance with activity - Consult OT/PT to assist with strengthening/mobility - Keep Call bell within reach- Keep bed low and locked with side  rails adjusted as appropriate- Keep care items and personal belongings within reach- Initiate and maintain comfort rounds- Make Fall Risk Sign visible to staff- Offer Toileting every 2 Hours, in advance of need- Initiate/Maintain bed/chair alarm- Obtain necessary fall risk management equipment: nonskid footwear- Apply yellow socks and bracelet for high fall risk patients- Consider moving patient to room near nurses station  Outcome: Progressing

## 2025-05-05 NOTE — PROGRESS NOTES
Progress Note - Orthopedics   Name: Vesna Langston 65 y.o. female I MRN: [id number]  Unit/Bed#: -01 I Date of Admission: 5/1/2025   Date of Service: 5/6/2025 I Hospital Day: 5    Assessment & Plan  Status post lumbar spinal fusion  Status post Bilateral Navigated revision L3-S1 laminectomy/decompression, revision L3-S1 posterior instrumented spinal fusion with TLIF on 5/1/2025 doing well postoperatively.     WBAT BLLE  PT/OT  Incentive spirometry  Pain control  DVT ppx: home eliquis and plavix  Diet: cardiovascular and carb controlled  Must wear LSO brace when out of bed  Lumbar spine precautions  Monitor drain output -- anticipate drain pull today  Monitor for acute blood loss anemia and administer or recommend IVF/prbc as indicated for greater than 2 gram Hgb drop or Hgb < 7  Appreciate medicine team for medical comanagement  Dispo planning       Please contact the SecureChat role for the Orthopedics service with any questions/concerns.    Subjective   No acute events, no acute distress. Denies fever/chills, SOB. Pain well controlled.  Patient states she still feels some fluid on her abdomen but has been peeing a lot with the lasix.     Objective      Temp:  [97.7 °F (36.5 °C)-98.4 °F (36.9 °C)] 98.3 °F (36.8 °C)  HR:  [] 98  BP: ()/(49-77) 91/54  Resp:  [16-19] 16  SpO2:  [92 %-96 %] 96 %  O2 Device: None (Room air)  Nasal Cannula O2 Flow Rate (L/min):  [3 L/min] 3 L/min  O2 Device: None (Room air)          I/O last 24 hours:  In: 1020 [P.O.:1020]  Out: 3995 [Urine:3900; Drains:95]    Lines/Drains/Airways       Active Status       Name Placement date Placement time Site Days    Closed/Suction Drain Left Back Bulb 19 Fr. 05/01/25  1615  Back  4                  Physical Exam   Musculoskeletal: Lumbar Spine  Dressing C/D/I   TTP suzanna-incisionally  Sensation intact to light touch L3-S1  Motor intact L2-S1 -- see below for detailed strength exam  2+ DP pulse  No calf swelling or ttp  Drain in place with  "serosanguinous output    Lower Extremity Motor Function    Right  Left    Iliopsoas  5/5  5/5    Quadriceps 5/5 5/5   Tibialis anterior  5/5  3/5    EHL  5/5 4/5    Gastroc. muscle  5/5 4/5            Lab Results: I have reviewed the following results:  Recent Labs     05/04/25  0459 05/05/25  0434   WBC 11.41* 8.39   HGB 9.5* 9.4*   HCT 31.0* 30.5*    211   BUN 11 10   CREATININE 0.64 0.56*     Blood Culture:  No results found for: \"BLOODCX\"  Wound Culture: No results found for: \"WOUNDCULT\"    "

## 2025-05-05 NOTE — ASSESSMENT & PLAN NOTE
Status post Bilateral Navigated revision L3-S1 laminectomy/decompression, revision L3-S1 posterior instrumented spinal fusion with TLIF on 5/1/2025. Stable postoperatively.    WBAT BLLE  PT/OT  Incentive spirometry  Pain control  DVT ppx: SQH and Plavix -- plan to restart eliquis on discharge  Diet: regular  Must wear LSO brace when out of bed  Lumbar spine precautions  Monitor drain output  Monitor for acute blood loss anemia and administer or recommend IVF/prbc as indicated for greater than 2 gram Hgb drop or Hgb < 7  Appreciate medicine team for medical comanagement  Dispo planning -- patient approved for [facility]

## 2025-05-05 NOTE — PLAN OF CARE
Problem: OCCUPATIONAL THERAPY ADULT  Goal: Performs self-care activities at highest level of function for planned discharge setting.  See evaluation for individualized goals.  Description: Treatment Interventions: ADL retraining, Endurance training, Patient/family training, Equipment evaluation/education, Compensatory technique education, Continued evaluation, Energy conservation, Activityengagement          See flowsheet documentation for full assessment, interventions and recommendations.   Outcome: Progressing  Note: Limitation: Decreased ADL status, Decreased endurance, Decreased self-care trans, Decreased high-level ADLs  Prognosis: Fair  Assessment: Pt seen for OT treatment session on this date focused on ADL retraining, functional transfers and mobility, energy conservation, functional endurance, recall of safety precautions, and patient/caregiver education. Pt was greeted supine in bed upon arrival and cooperative throughout session. Pt requires SUP for bed mobility, grooming, and UB dressing this date. Pt performs STS intially w/ min a x1 from bed and progresses to CGA w/ RW. Ambulates community distance down hallway w/ one seated rest break in bedside chair, performs steps, and returns back to room for ADLs. Performs grooming, bathing, and dressing seated in bedside chair this date. Requires min a for donning underwear and mod a for donning and doffing socks. Following session, pt was left in chair with chair alarm on and all needs within reach. Pt continues to be limited by functional status related to ADLs and transfers, although demonstrates improvements in functional mobility. The patient's raw score on the -PAC Daily Activity Inpatient Short Form is 19. A raw score ofgreater than or equal to 19 suggests the patient may benefit from discharge to home. Please refer to the recommendation of the Occupational Therapist for safe discharge planning. Pt would benefit from continued acute OT intervention  with plan to continue OT treatment sessions 2-3x per week. Pt is uncomfortable discharging home alone without rehab at this time. Pt reports frequent falls around her home PTA and states she will not return home without attending a rehab facility. Recommend d/c to level one services.     Rehab Resource Intensity Level, OT: I (Maximum Resource Intensity)

## 2025-05-06 ENCOUNTER — HOSPITAL ENCOUNTER (INPATIENT)
Facility: HOSPITAL | Age: 65
LOS: 8 days | Discharge: HOME | DRG: 243 | End: 2025-05-14
Attending: PHYSICAL MEDICINE & REHABILITATION | Admitting: PHYSICAL MEDICINE & REHABILITATION
Payer: OTHER MISCELLANEOUS

## 2025-05-06 ENCOUNTER — REMOTE DEVICE CLINIC VISIT (OUTPATIENT)
Dept: CARDIOLOGY CLINIC | Facility: CLINIC | Age: 65
End: 2025-05-06
Payer: COMMERCIAL

## 2025-05-06 VITALS
RESPIRATION RATE: 16 BRPM | TEMPERATURE: 98.3 F | HEART RATE: 88 BPM | SYSTOLIC BLOOD PRESSURE: 130 MMHG | HEIGHT: 68 IN | WEIGHT: 217.15 LBS | OXYGEN SATURATION: 95 % | DIASTOLIC BLOOD PRESSURE: 62 MMHG | BODY MASS INDEX: 32.91 KG/M2

## 2025-05-06 DIAGNOSIS — K21.9 GERD (GASTROESOPHAGEAL REFLUX DISEASE): ICD-10-CM

## 2025-05-06 DIAGNOSIS — Z98.1 STATUS POST LUMBAR SPINAL FUSION: Primary | ICD-10-CM

## 2025-05-06 DIAGNOSIS — E03.9 ACQUIRED HYPOTHYROIDISM: ICD-10-CM

## 2025-05-06 DIAGNOSIS — R52 ACUTE PAIN: ICD-10-CM

## 2025-05-06 DIAGNOSIS — N18.30 TYPE 2 DIABETES MELLITUS WITH STAGE 3 CHRONIC KIDNEY DISEASE, WITHOUT LONG-TERM CURRENT USE OF INSULIN, UNSPECIFIED WHETHER STAGE 3A OR 3B CKD (HCC): ICD-10-CM

## 2025-05-06 DIAGNOSIS — K59.00 CONSTIPATION: ICD-10-CM

## 2025-05-06 DIAGNOSIS — E11.22 TYPE 2 DIABETES MELLITUS WITH STAGE 3 CHRONIC KIDNEY DISEASE, WITHOUT LONG-TERM CURRENT USE OF INSULIN, UNSPECIFIED WHETHER STAGE 3A OR 3B CKD (HCC): ICD-10-CM

## 2025-05-06 DIAGNOSIS — D64.9 ANEMIA: ICD-10-CM

## 2025-05-06 DIAGNOSIS — Z95.810 AICD (AUTOMATIC CARDIOVERTER/DEFIBRILLATOR) PRESENT: Primary | ICD-10-CM

## 2025-05-06 DIAGNOSIS — I50.20 HFREF (HEART FAILURE WITH REDUCED EJECTION FRACTION) (HCC): ICD-10-CM

## 2025-05-06 DIAGNOSIS — N18.2 STAGE 2 CHRONIC KIDNEY DISEASE: ICD-10-CM

## 2025-05-06 PROBLEM — K59.03 DRUG-INDUCED CONSTIPATION: Status: ACTIVE | Noted: 2024-04-10

## 2025-05-06 PROBLEM — E11.9 T2DM (TYPE 2 DIABETES MELLITUS) (HCC): Status: ACTIVE | Noted: 2025-01-03

## 2025-05-06 PROBLEM — D72.829 LEUKOCYTOSIS: Status: RESOLVED | Noted: 2025-05-02 | Resolved: 2025-05-06

## 2025-05-06 PROBLEM — I25.10 CAD (CORONARY ARTERY DISEASE): Status: ACTIVE | Noted: 2024-03-23

## 2025-05-06 LAB
ANION GAP SERPL CALCULATED.3IONS-SCNC: 9 MMOL/L (ref 4–13)
BUN SERPL-MCNC: 9 MG/DL (ref 5–25)
CALCIUM SERPL-MCNC: 8.5 MG/DL (ref 8.4–10.2)
CHLORIDE SERPL-SCNC: 101 MMOL/L (ref 96–108)
CO2 SERPL-SCNC: 28 MMOL/L (ref 21–32)
CREAT SERPL-MCNC: 0.68 MG/DL (ref 0.6–1.3)
ERYTHROCYTE [DISTWIDTH] IN BLOOD BY AUTOMATED COUNT: 16 % (ref 11.6–15.1)
GFR SERPL CREATININE-BSD FRML MDRD: 90 ML/MIN/1.73SQ M
GLUCOSE SERPL-MCNC: 112 MG/DL (ref 65–140)
GLUCOSE SERPL-MCNC: 124 MG/DL (ref 65–140)
GLUCOSE SERPL-MCNC: 144 MG/DL (ref 65–140)
GLUCOSE SERPL-MCNC: 158 MG/DL (ref 65–140)
GLUCOSE SERPL-MCNC: 171 MG/DL (ref 65–140)
HCT VFR BLD AUTO: 28.5 % (ref 34.8–46.1)
HGB BLD-MCNC: 9 G/DL (ref 11.5–15.4)
MAGNESIUM SERPL-MCNC: 1.9 MG/DL (ref 1.9–2.7)
MCH RBC QN AUTO: 26.1 PG (ref 26.8–34.3)
MCHC RBC AUTO-ENTMCNC: 31.6 G/DL (ref 31.4–37.4)
MCV RBC AUTO: 83 FL (ref 82–98)
PLATELET # BLD AUTO: 224 THOUSANDS/UL (ref 149–390)
PMV BLD AUTO: 9.4 FL (ref 8.9–12.7)
POTASSIUM SERPL-SCNC: 3.9 MMOL/L (ref 3.5–5.3)
RBC # BLD AUTO: 3.45 MILLION/UL (ref 3.81–5.12)
SODIUM SERPL-SCNC: 138 MMOL/L (ref 135–147)
WBC # BLD AUTO: 7.92 THOUSAND/UL (ref 4.31–10.16)

## 2025-05-06 PROCEDURE — NC001 PR NO CHARGE: Performed by: ORTHOPAEDIC SURGERY

## 2025-05-06 PROCEDURE — 99223 1ST HOSP IP/OBS HIGH 75: CPT

## 2025-05-06 PROCEDURE — 82948 REAGENT STRIP/BLOOD GLUCOSE: CPT

## 2025-05-06 PROCEDURE — 80048 BASIC METABOLIC PNL TOTAL CA: CPT | Performed by: PHYSICIAN ASSISTANT

## 2025-05-06 PROCEDURE — NC001 PR NO CHARGE: Performed by: PHYSICAL MEDICINE & REHABILITATION

## 2025-05-06 PROCEDURE — 99232 SBSQ HOSP IP/OBS MODERATE 35: CPT | Performed by: PHYSICIAN ASSISTANT

## 2025-05-06 PROCEDURE — 85027 COMPLETE CBC AUTOMATED: CPT | Performed by: PHYSICIAN ASSISTANT

## 2025-05-06 PROCEDURE — 93296 REM INTERROG EVL PM/IDS: CPT | Performed by: INTERNAL MEDICINE

## 2025-05-06 PROCEDURE — 99024 POSTOP FOLLOW-UP VISIT: CPT | Performed by: ORTHOPAEDIC SURGERY

## 2025-05-06 PROCEDURE — 83735 ASSAY OF MAGNESIUM: CPT | Performed by: PHYSICIAN ASSISTANT

## 2025-05-06 PROCEDURE — 93295 DEV INTERROG REMOTE 1/2/MLT: CPT | Performed by: INTERNAL MEDICINE

## 2025-05-06 RX ORDER — ONDANSETRON 4 MG/1
4 TABLET, ORALLY DISINTEGRATING ORAL EVERY 6 HOURS PRN
Status: DISCONTINUED | OUTPATIENT
Start: 2025-05-06 | End: 2025-05-14 | Stop reason: HOSPADM

## 2025-05-06 RX ORDER — ISOSORBIDE MONONITRATE 30 MG/1
30 TABLET, EXTENDED RELEASE ORAL 2 TIMES DAILY
Status: DISCONTINUED | OUTPATIENT
Start: 2025-05-06 | End: 2025-05-08

## 2025-05-06 RX ORDER — LACTULOSE 10 G/15ML
30 SOLUTION ORAL ONCE
Status: COMPLETED | OUTPATIENT
Start: 2025-05-06 | End: 2025-05-06

## 2025-05-06 RX ORDER — SENNOSIDES 8.6 MG
1 TABLET ORAL DAILY
Status: DISCONTINUED | OUTPATIENT
Start: 2025-05-07 | End: 2025-05-09

## 2025-05-06 RX ORDER — FUROSEMIDE 40 MG/1
80 TABLET ORAL ONCE
Status: COMPLETED | OUTPATIENT
Start: 2025-05-06 | End: 2025-05-06

## 2025-05-06 RX ORDER — PANTOPRAZOLE SODIUM 40 MG/1
40 TABLET, DELAYED RELEASE ORAL
Status: DISCONTINUED | OUTPATIENT
Start: 2025-05-06 | End: 2025-05-14 | Stop reason: HOSPADM

## 2025-05-06 RX ORDER — DOFETILIDE 0.12 MG/1
125 CAPSULE ORAL EVERY 12 HOURS SCHEDULED
Status: DISCONTINUED | OUTPATIENT
Start: 2025-05-06 | End: 2025-05-14 | Stop reason: HOSPADM

## 2025-05-06 RX ORDER — FLUTICASONE PROPIONATE 50 MCG
1 SPRAY, SUSPENSION (ML) NASAL DAILY
Status: DISCONTINUED | OUTPATIENT
Start: 2025-05-07 | End: 2025-05-14 | Stop reason: HOSPADM

## 2025-05-06 RX ORDER — POTASSIUM CHLORIDE 1500 MG/1
40 TABLET, EXTENDED RELEASE ORAL ONCE
Status: COMPLETED | OUTPATIENT
Start: 2025-05-06 | End: 2025-05-06

## 2025-05-06 RX ORDER — PREGABALIN 75 MG/1
75 CAPSULE ORAL 3 TIMES DAILY
Status: DISCONTINUED | OUTPATIENT
Start: 2025-05-06 | End: 2025-05-14 | Stop reason: HOSPADM

## 2025-05-06 RX ORDER — FUROSEMIDE 40 MG/1
40 TABLET ORAL 2 TIMES DAILY PRN
Status: DISCONTINUED | OUTPATIENT
Start: 2025-05-06 | End: 2025-05-06 | Stop reason: HOSPADM

## 2025-05-06 RX ORDER — LEVOTHYROXINE SODIUM 75 UG/1
75 TABLET ORAL DAILY
Status: DISCONTINUED | OUTPATIENT
Start: 2025-05-08 | End: 2025-05-14 | Stop reason: HOSPADM

## 2025-05-06 RX ORDER — FUROSEMIDE 40 MG/1
80 TABLET ORAL 2 TIMES DAILY PRN
Status: DISCONTINUED | OUTPATIENT
Start: 2025-05-06 | End: 2025-05-06 | Stop reason: HOSPADM

## 2025-05-06 RX ORDER — DOCUSATE SODIUM 100 MG/1
100 CAPSULE, LIQUID FILLED ORAL 2 TIMES DAILY
Status: DISCONTINUED | OUTPATIENT
Start: 2025-05-06 | End: 2025-05-13

## 2025-05-06 RX ORDER — ACETAMINOPHEN 325 MG/1
650 TABLET ORAL EVERY 6 HOURS SCHEDULED
Status: DISCONTINUED | OUTPATIENT
Start: 2025-05-06 | End: 2025-05-13

## 2025-05-06 RX ORDER — CALCIUM CARBONATE 500 MG/1
1000 TABLET, CHEWABLE ORAL DAILY PRN
Status: DISCONTINUED | OUTPATIENT
Start: 2025-05-06 | End: 2025-05-14 | Stop reason: HOSPADM

## 2025-05-06 RX ORDER — MAGNESIUM HYDROXIDE/ALUMINUM HYDROXICE/SIMETHICONE 120; 1200; 1200 MG/30ML; MG/30ML; MG/30ML
30 SUSPENSION ORAL EVERY 6 HOURS PRN
Status: DISCONTINUED | OUTPATIENT
Start: 2025-05-06 | End: 2025-05-14 | Stop reason: HOSPADM

## 2025-05-06 RX ORDER — INSULIN LISPRO 100 [IU]/ML
1-5 INJECTION, SOLUTION INTRAVENOUS; SUBCUTANEOUS
Status: DISCONTINUED | OUTPATIENT
Start: 2025-05-06 | End: 2025-05-14 | Stop reason: HOSPADM

## 2025-05-06 RX ORDER — INSULIN LISPRO 100 [IU]/ML
1-6 INJECTION, SOLUTION INTRAVENOUS; SUBCUTANEOUS
Status: DISCONTINUED | OUTPATIENT
Start: 2025-05-06 | End: 2025-05-14 | Stop reason: HOSPADM

## 2025-05-06 RX ORDER — FUROSEMIDE 40 MG/1
80 TABLET ORAL 2 TIMES DAILY PRN
Status: DISCONTINUED | OUTPATIENT
Start: 2025-05-06 | End: 2025-05-07

## 2025-05-06 RX ORDER — ATORVASTATIN CALCIUM 80 MG/1
80 TABLET, FILM COATED ORAL EVERY EVENING
Status: DISCONTINUED | OUTPATIENT
Start: 2025-05-06 | End: 2025-05-14 | Stop reason: HOSPADM

## 2025-05-06 RX ORDER — CLOPIDOGREL BISULFATE 75 MG/1
75 TABLET ORAL DAILY
Status: DISCONTINUED | OUTPATIENT
Start: 2025-05-07 | End: 2025-05-14 | Stop reason: HOSPADM

## 2025-05-06 RX ORDER — OXYCODONE HYDROCHLORIDE 5 MG/1
5 TABLET ORAL EVERY 4 HOURS PRN
Refills: 0 | Status: DISCONTINUED | OUTPATIENT
Start: 2025-05-06 | End: 2025-05-14 | Stop reason: HOSPADM

## 2025-05-06 RX ORDER — METOPROLOL SUCCINATE 50 MG/1
50 TABLET, EXTENDED RELEASE ORAL
Status: DISCONTINUED | OUTPATIENT
Start: 2025-05-06 | End: 2025-05-14 | Stop reason: HOSPADM

## 2025-05-06 RX ORDER — OXYCODONE HYDROCHLORIDE 10 MG/1
10 TABLET ORAL EVERY 4 HOURS PRN
Refills: 0 | Status: DISCONTINUED | OUTPATIENT
Start: 2025-05-06 | End: 2025-05-14 | Stop reason: HOSPADM

## 2025-05-06 RX ORDER — LACTULOSE 10 G/15ML
30 SOLUTION ORAL DAILY PRN
Status: DISCONTINUED | OUTPATIENT
Start: 2025-05-06 | End: 2025-05-14 | Stop reason: HOSPADM

## 2025-05-06 RX ORDER — FUROSEMIDE 40 MG/1
40 TABLET ORAL 2 TIMES DAILY PRN
Status: DISCONTINUED | OUTPATIENT
Start: 2025-05-06 | End: 2025-05-07

## 2025-05-06 RX ADMIN — PANTOPRAZOLE SODIUM 40 MG: 40 TABLET, DELAYED RELEASE ORAL at 08:00

## 2025-05-06 RX ADMIN — PREGABALIN 75 MG: 75 CAPSULE ORAL at 20:54

## 2025-05-06 RX ADMIN — OXYCODONE HYDROCHLORIDE 5 MG: 5 TABLET ORAL at 18:33

## 2025-05-06 RX ADMIN — APIXABAN 5 MG: 5 TABLET, FILM COATED ORAL at 09:00

## 2025-05-06 RX ADMIN — PREGABALIN 75 MG: 75 CAPSULE ORAL at 18:04

## 2025-05-06 RX ADMIN — DOFETILIDE 125 MCG: 0.12 CAPSULE ORAL at 20:54

## 2025-05-06 RX ADMIN — LEVOTHYROXINE SODIUM 75 MCG: 75 TABLET ORAL at 05:38

## 2025-05-06 RX ADMIN — OXYCODONE HYDROCHLORIDE 5 MG: 5 TABLET ORAL at 14:27

## 2025-05-06 RX ADMIN — ATORVASTATIN CALCIUM 80 MG: 80 TABLET, FILM COATED ORAL at 18:04

## 2025-05-06 RX ADMIN — LACTULOSE 30 G: 20 SOLUTION ORAL at 10:12

## 2025-05-06 RX ADMIN — ACETAMINOPHEN 650 MG: 325 TABLET, FILM COATED ORAL at 23:10

## 2025-05-06 RX ADMIN — SENNOSIDES 8.6 MG: 8.6 TABLET, FILM COATED ORAL at 10:16

## 2025-05-06 RX ADMIN — FLUTICASONE PROPIONATE 1 SPRAY: 50 SPRAY, METERED NASAL at 10:18

## 2025-05-06 RX ADMIN — APIXABAN 5 MG: 5 TABLET, FILM COATED ORAL at 20:54

## 2025-05-06 RX ADMIN — POTASSIUM CHLORIDE 40 MEQ: 1500 TABLET, EXTENDED RELEASE ORAL at 10:12

## 2025-05-06 RX ADMIN — DOCUSATE SODIUM 100 MG: 100 CAPSULE, LIQUID FILLED ORAL at 18:03

## 2025-05-06 RX ADMIN — OXYCODONE HYDROCHLORIDE 5 MG: 5 TABLET ORAL at 23:16

## 2025-05-06 RX ADMIN — PANTOPRAZOLE SODIUM 40 MG: 40 TABLET, DELAYED RELEASE ORAL at 18:04

## 2025-05-06 RX ADMIN — DOFETILIDE 125 MCG: 0.12 CAPSULE ORAL at 08:00

## 2025-05-06 RX ADMIN — DOCUSATE SODIUM 100 MG: 100 CAPSULE, LIQUID FILLED ORAL at 08:00

## 2025-05-06 RX ADMIN — OXYCODONE HYDROCHLORIDE 5 MG: 5 TABLET ORAL at 03:40

## 2025-05-06 RX ADMIN — ACETAMINOPHEN 650 MG: 325 TABLET, FILM COATED ORAL at 05:37

## 2025-05-06 RX ADMIN — ACETAMINOPHEN 650 MG: 325 TABLET, FILM COATED ORAL at 18:04

## 2025-05-06 RX ADMIN — ISOSORBIDE MONONITRATE 30 MG: 30 TABLET, EXTENDED RELEASE ORAL at 09:00

## 2025-05-06 RX ADMIN — INSULIN LISPRO 1 UNITS: 100 INJECTION, SOLUTION INTRAVENOUS; SUBCUTANEOUS at 21:00

## 2025-05-06 RX ADMIN — CLOPIDOGREL BISULFATE 75 MG: 75 TABLET, FILM COATED ORAL at 10:16

## 2025-05-06 RX ADMIN — FUROSEMIDE 80 MG: 40 TABLET ORAL at 10:12

## 2025-05-06 RX ADMIN — PREGABALIN 75 MG: 75 CAPSULE ORAL at 08:00

## 2025-05-06 RX ADMIN — ALUMINUM HYDROXIDE, MAGNESIUM HYDROXIDE, AND SIMETHICONE 30 ML: 200; 200; 20 SUSPENSION ORAL at 10:23

## 2025-05-06 RX ADMIN — INSULIN LISPRO 1 UNITS: 100 INJECTION, SOLUTION INTRAVENOUS; SUBCUTANEOUS at 18:06

## 2025-05-06 RX ADMIN — OXYCODONE HYDROCHLORIDE 5 MG: 5 TABLET ORAL at 10:23

## 2025-05-06 NOTE — H&P
H&P - PMR   Name: Vesna Langston 65 y.o. female I MRN: [id number]  Unit/Bed#: -01 I Date of Admission: 5/6/2025   Date of Service: 5/6/2025 I Hospital Day: 0     Assessment & Plan  Status post lumbar spinal fusion  S/p B/L navigated revision L3-S1 laminectomy/decompression, revision L3-S1 posterior instrumented spinal fusion with TLIF on 05/1/25  WBAT LLE  LSO brace OOB  F/u Dr Mcrae 2 weeks (5/15)  Keep dressing intact and incision dry until 2 week post op visit  DVT ppx: eliquis and plavix  Monitor incision  PT/OT evaluate and treat  Acute pain  Tylenol 650 Q6  Oxycodone 10/5  Pregabalin   Titrate pain meds as needed  Constipation  Scheduled colace and senna  Lactulose PRN  Titrate meds as needed  HFrEF (heart failure with reduced ejection fraction) ([facility])  Wt Readings from Last 3 Encounters:   05/06/25 100 kg (220 lb 10.9 oz)   05/06/25 98.5 kg (217 lb 2.5 oz)   04/24/25 94.3 kg (208 lb)     LVEF 20% 3/26/24  Home: jardiance 25, Entresto BID, PRN lAsix  Entresto and Jardiance held in acute  Restart home meds when able  Daily weights  Sodium restrictive diet  Management per IM  CAD (coronary artery disease)  Continuing toprol, Imdur, and lipitor  S/p MI 4/2024, received PCI  T2DM (type 2 diabetes mellitus) ([facility])  Lab Results   Component Value Date    HGBA1C 5.9 (H) 03/28/2025       Recent Labs     05/05/25  2141 05/06/25  0809 05/06/25  1211 05/06/25  1656   POCGLU 158* 112 144* 158*       Blood Sugar Average: Last 72 hrs:  (P) 158  Home: jardiance, Ozempic (held in acute due to reflux)  SSI  Restart home meds when able  Management per IM  GERD (gastroesophageal reflux disease)  Continuing protonix  Hypothyroid  Continuing levothyroxine  Atrial flutter, paroxysmal (HCC)  Continuing Torpol, Tikosyn BID, and eliquis  Anemia  Baseline hgb 11-12  5/6 hgb 9.0  Monitor h/h  S/P ICD (internal cardiac defibrillator) procedure  Implanted by Dr Lama 3/27/24 due to scar related VT and ischemic cardiomyopathy    Rehab  Diagnosis: Impairment of mobility, safety and Activities of Daily Living (ADLs) due to Neurologic Conditions:  03.9  Other Neurologic Disorder Lumbar radiculopathy    History of Present Illness    Vesna Langston is a 65 y.o. female who presented to the [facility] on 5[facility] for elective orthopedic surgery due to lumbar radiculopathy. Pain begain 2 years ago after an injury at work. S/p exploration of fusion mass L3-S1, removal of hardware L3-S1, TLIF TLIF, laminotomy/facetectomy, L5-S1; Arthrodesis, T4-5; Arthrodesis, T3-4; Posterior segmental instrumentation/pedicle screw fixation,L3-S1. Admitted to ARC 5/6 .    Subjective : Patient seen on admission in recliner. States she is in pain due to transport and while her pain medication helps, she tries not to use it. Reports constipation and LBM 5/3. Endorses weakness in her LLE that has been occurring since her accident 2 years ago. States that she sometimes will drag her left foot but has not tripped over it. She does not use any orthotics but tries to use high quality sneakers. Denies SOB, chest pain, fever, chills. She does report waking up to having a hot flash but has not occurred since. At home, she mainly uses a cane and occasionally a walker. When out in the community she uses a walker.    Review of Systems   Constitutional:  Negative for chills and fever.   Respiratory:  Negative for shortness of breath.    Cardiovascular:  Negative for chest pain, palpitations and leg swelling.   Gastrointestinal:  Positive for constipation. Negative for abdominal pain, diarrhea, nausea and vomiting.   Musculoskeletal:  Positive for back pain.   Neurological:  Positive for numbness (LLE below knee chronic).       Restrictions include: spinal orthosis when out of bed and Spinal precautions  Fall precautions      Functional History - Prior to Admission:    Patient was not independent with mobility/ambulation, transfers, ADL's, IADL's.    Functional Status Upon  Admission to ARC:  Self Care:  Eatin: Not applicable  Oral hygiene: 05: Setup or clean-up assistance  Shower/bathing self: 03: Partial/moderate assistance  Upper body dressin: Supervision or touching  assistance  Lower body dressin: Partial/moderate assistance  Putting on/taking off footwear: 03: Partial/moderate assistance  Toilet hygiene: 04: Supervision or touching  assistance   Transfers:  Roll left and right: 04: Supervision or touching  assistance  Sit to lyin: Not applicable  Lying to sitting on side of bed: 04: Supervision or touching  assistance  Sit to stand: 04: Supervision or touching  assistance  Chair/bed to chair transfer: 04: Supervision or touching  assistance  Toilet transfer: 04: Supervision or touching  assistance  Mobility:  Walk 10 ft: 03: Partial/moderate assistance  Walk 50 ft with two turns: 10: Not attempted due to environmental limitations  Walk 150ft: 03: Partial/moderate assistance    PRIOR LEVEL OF FUNCTION:  She lives in a(n) single family home  Vesna Langston is  and lives alone.  Self Care: Independent, Indoor Mobility: Modified Independent, Stairs (in/outdoor): Modified Independent, and Cognition: Independent  Prior to patient's admission, patient was fully Independent with ADLs and received assistance with IADLs ( every Friday). She was driving. She was Modified Independent with use of Rollator vs SPC for mobility.      FALLS IN THE LAST 6 MONTHS: >10     HOME ENVIRONMENT:  The living area: can live on one level  There are 2 steps to enter the home.    The patient will not have 24 hour supervision/physical assistance available upon discharge.  Has 2 local sons and a daughter. Daughter has offered for patient to stay with her at d/c if needed. Her home is a split level with 6 + 6 to main living area where kitchen and family room's are. Pt could enter through bottom level with no AYESHA and would have a walk in shower with bedroom on that level if  needed. Will not have a true 24/7 but family can help before/after work shifts.      PREVIOUS DME:  Equipment in home (previous DME): Grab Bars, Rolling Walker, Single Point Cane, and Raised Toilet, Built-in Shower Seat, Rollator    Objective :  Temp:  [97.8 °F (36.6 °C)-98.3 °F (36.8 °C)] 97.8 °F (36.6 °C)  HR:  [88-98] 97  BP: ()/(49-77) 110/71  Resp:  [16-18] 18  SpO2:  [92 %-96 %] 95 %  O2 Device: None (Room air)    Physical Exam  Constitutional:       General: She is not in acute distress.  HENT:      Head: Normocephalic and atraumatic.      Mouth/Throat:      Mouth: Mucous membranes are moist.   Cardiovascular:      Rate and Rhythm: Normal rate and regular rhythm.   Pulmonary:      Effort: Pulmonary effort is normal.      Breath sounds: Normal breath sounds.   Abdominal:      General: Bowel sounds are normal.   Musculoskeletal:      Comments: On spinal precautions  Wearing brace   Skin:     General: Skin is warm and dry.   Neurological:      Mental Status: She is alert. Mental status is at baseline.      Sensory: Sensory deficit (LLE below knee diminished sensation) present.      Motor: Weakness (LLE) present.   Psychiatric:         Mood and Affect: Mood normal.         Behavior: Behavior normal.         MMT:   Strength:   Right  Left  Site  Right  Left  Site    4 4 S Ab: Shoulder Abductors  3*  3* HF: Hip Flexors    5 5  EF: Elbow Flexors  5  4 KF: Knee Flexors    5  5  EE: Elbow Extensors  5  4 KE: Knee Extensors    5  5  WE: Wrist Extensors  5  4 DR: Dorsi Flexors    5  5  FF: Finger Flexors  5  4  PF: Plantar Flexors    5  5  HI: Hand Intrinsics    EHL: Extensor Hallucis Longus   *limited due to pain    Lab Results: I have reviewed the following results:  Results from last 7 days   Lab Units 05/06/25  0534 05/05/25  0434 05/04/25  0459   HEMOGLOBIN g/dL 9.0* 9.4* 9.5*   HEMATOCRIT % 28.5* 30.5* 31.0*   WBC Thousand/uL 7.92 8.39 11.41*   PLATELETS Thousands/uL 224 211 195     Results from last 7 days  "  Lab Units 05/06/25  0534 05/05/25  0434 05/04/25  0459   BUN mg/dL 9 10 11   SODIUM mmol/L 138 139 136   POTASSIUM mmol/L 3.9 3.9 3.6   CHLORIDE mmol/L 101 103 102   CREATININE mg/dL 0.68 0.56* 0.64              Wt Readings from Last 1 Encounters:   05/06/25 100 kg (220 lb 10.9 oz)     Estimated body mass index is 33.55 kg/m² as calculated from the following:    Height as of this encounter: 5' 8\" (1.727 m).    Weight as of this encounter: 100 kg (220 lb 10.9 oz).        Family/Patient Goals:  Patient/family's goals: Return to previous home/apartment    Patient will receive PT and OT 90 minutes each per day, five days per week to achieve rehab goals or participate in 900 minutes of therapy within a 7 day week period.    Mobility Goals: [place]  Transfer Goals: [place]ger  Activities of Daily Living (ADLs) Goals: [place]    Discharge Planning:    Case Managment and Social Work to review patient/family resources and to coordinate Discharge Planning.    Estimated length of stay: 10 to 14 days    Patient/family education/training needs to be discussed in weekly team meeting.      Administrative Statements   Medical Necessity Criteria for ARC Admission: Post operative pain managemnet, skin an incision care and monitoring, CHF management, anemia, monitoring and management, DM management. In addition, the preadmission screen, post-admission physical evaluation, overall plan of care and admissions order demonstrate a reasonable expectation that the following criteria were met at the time of admission to the ARC.  The patient requires active and ongoing therapeutic intervention of multiple therapy disciplines (physical therapy, occupational therapy, speech-language pathology, or prosthetics/orthotics), one of which is physical or occupational therapy.    Patient requires an intensive rehabilitation therapy program, as defined in Chapter 1, section 110.2.2 of the CMS Medicare Policy Manual. This intensive " rehabilitation therapy program will consist of at least 3 hours of therapy per day at least 5 days per week or at least 15 hours of intensive rehabilitation therapy within a 7 consecutive day period, beginning with the date of admission to the [facility].    The patient is reasonably expected to actively participate in, and benefit significantly from, the intensive rehabilitation therapy program as defined in Chapter 1, section 110.2.2 of the CMS Medicare Policy Manual at this time of admission to the [facility]. She can reasonably be expected to make measurable improvement (that will be of practical value to improve the patient’s functional capacity or adaptation to impairments) as a result of the rehabilitation treatment, as defined in section 110.3, and such improvement can be expected to be made within the prescribed period of time. As noted in the CMS Medicare Policy Manual, the patient need not be expected to achieve complete independence in the domain of self-care nor be expected to return to his or her prior level of functioning in order to meet this standard.  The patient must require physician supervision by a rehabilitation physician. As such, a rehabilitation physician will conduct face-to-face visits with the patient at least 3 days per week throughout the patient’s stay in the [facility] to assess the patient both medically and functionally, as well as to modify the course of treatment as needed to maximize the patient’s capacity to benefit from the rehabilitation process.  The patient requires an intensive and coordinated interdisciplinary approach to providing rehabilitation, as defined in Chapter 1, section 110.2.5 of the CMS Medicare Policy Manual. This will be achieved through periodic team conferences, conducted at least once in a 7-day period, and comprising of an interdisciplinary team of medical professionals consisting of: a rehabilitation physician, registered nurse,  and/or , and a  licensed/certified therapist from each therapy discipline involved in treating the patient.     Manisha Saldivar PA-C  Physical Medicine and Rehabilitation

## 2025-05-06 NOTE — ASSESSMENT & PLAN NOTE
Recent Labs     05/04/25  0459 05/05/25  0434 05/06/25  0534   HGB 9.5* 9.4* 9.0*     Baseline Hgb 11-12   Etiology multifactorial in setting of ABLA w/ recent surgery & dilutional component w/ IVFs   Trend CBC  Continue monitoring DARWIN drain output

## 2025-05-06 NOTE — PROGRESS NOTES
Assistance for IADLs- neighbors helped with grocery shopping, lawn care, household cleaning (once a week)

## 2025-05-06 NOTE — UTILIZATION REVIEW
Elective Surgical Continued Stay Review    Date: 5/5/25     POD#: 4 Days Post-Op   Current Patient Class: Inpatient  Current Level of Care: med surg    Assessment/Plan: 65 y.o. female, initial surgery date 5/1/25 status post Bilateral Navigated revision L3-S1 laminectomy/decompression, revision L3-S1 posterior instrumented spinal fusion with TLIF. Some fluid overload today, discussed with medicine team adding PRN lasix. Continue WBAT BLLE, continue PT OT, inc spirometry, pain control, dvt ppx, cardiac diet and carb controlled. Must wear LSO brace when out of bed. Lumbar spine precautions. Monitor drain output. Monitor for acute blood loss anemia and administer or recommend IVF/prbc as indicated for greater than 2 gram Hgb drop or Hgb < 7.    Medications:   Scheduled Medications:  acetaminophen, 650 mg, Oral, Q6H MONA  apixaban, 5 mg, Oral, Q12H  atorvastatin, 80 mg, Oral, QPM  clopidogrel, 75 mg, Oral, Daily  docusate sodium, 100 mg, Oral, BID  dofetilide, 125 mcg, Oral, Q12H MONA  fluticasone, 1 spray, Nasal, Daily  furosemide, 80 mg, Oral, Once  insulin lispro, 1-5 Units, Subcutaneous, HS  insulin lispro, 1-6 Units, Subcutaneous, TID AC  isosorbide mononitrate, 30 mg, Oral, BID  lactulose, 30 g, Oral, Once  levothyroxine, 75 mcg, Oral, Daily  metoprolol succinate, 50 mg, Oral, HS  pantoprazole, 40 mg, Oral, BID AC  potassium chloride, 40 mEq, Oral, Once  pregabalin, 75 mg, Oral, TID  senna, 1 tablet, Oral, Daily      Continuous IV Infusions: none     PRN Meds:  aluminum-magnesium hydroxide-simethicone, 30 mL, Oral, Q6H PRN  calcium carbonate, 1,000 mg, Oral, Daily PRN  furosemide, 40 mg, Oral, BID PRN  furosemide, 80 mg, Oral, BID PRN  lactulose, 30 g, Oral, Daily PRN  ondansetron, 4 mg, Intravenous, Q6H PRN  oxyCODONE, 10 mg, Oral, Q4H PRN x8 total  oxyCODONE, 5 mg, Oral, Q4H PRN x12 total      Discharge Plan: tbd    Vital Signs (last 3 days)       Date/Time Temp Pulse Resp BP MAP (mmHg) SpO2 Calculated FIO2 (%) -  Nasal Cannula Nasal Cannula O2 Flow Rate (L/min) O2 Device Bryan Coma Scale Score Pain    05/06/25 0728 -- -- -- -- -- -- -- -- None (Room air) 15 3    05/06/25 0537 -- -- -- -- -- -- -- -- -- -- 3    05/06/25 0340 -- -- -- -- -- -- -- -- -- -- 6 05/05/25 2334 -- -- -- -- -- -- -- -- -- -- 6 05/05/25 23:33:38 -- 98 16 91/54 66 96 % -- -- -- -- --    05/05/25 2144 -- -- -- -- -- -- -- -- -- -- 8 05/05/25 21:43:55 -- 90 -- 89/49 62 92 % -- -- -- -- --    05/05/25 2000 -- -- -- -- -- -- -- -- None (Room air) 15 No Pain    05/05/25 19:07:35 98.3 °F (36.8 °C) 98 16 122/77 92 94 % -- -- None (Room air) -- --    05/05/25 1701 -- -- -- -- -- -- -- -- -- -- 8 05/05/25 1650 -- -- -- -- -- -- -- -- -- -- 6 05/05/25 15:56:31 -- 110 -- 112/54 73 93 % -- -- -- -- --    05/05/25 15:52:05 98.4 °F (36.9 °C) 91 19 112/54 73 96 % -- -- -- -- --    05/05/25 1127 -- -- -- -- -- -- -- -- -- -- 7 05/05/25 1003 -- -- -- -- -- -- -- -- -- -- 4 05/05/25 09:41:42 -- 92 -- 111/73 86 95 % -- -- -- -- --    05/05/25 0938 -- -- -- -- -- -- -- -- -- -- 3    05/05/25 0914 -- -- -- -- -- -- -- -- -- -- 10 - Worst Possible Pain    05/05/25 0748 -- -- -- -- -- -- 32 3 L/min None (Room air) 15 No Pain    05/05/25 07:36:54 97.7 °F (36.5 °C) 84 16 105/63 77 93 % -- -- -- -- No Pain    05/05/25 0513 -- -- -- -- -- -- -- -- -- -- No Pain    05/05/25 04:23:23 97.9 °F (36.6 °C) 99 18 107/54 72 94 % -- -- -- -- --    05/05/25 0400 -- -- -- -- -- -- -- -- -- 15 --    05/05/25 0235 -- -- -- -- -- -- -- -- -- -- 5 05/05/25 0000 -- -- -- -- -- -- -- -- None (Room air) 15 --    05/04/25 2339 -- -- -- -- -- -- -- -- -- -- 5 05/04/25 23:30:54 98.3 °F (36.8 °C) 91 16 108/57 74 98 % -- -- None (Room air) -- 5    05/04/25 21:14:56 98.1 °F (36.7 °C) 93 18 107/64 78 92 % -- -- -- -- --    05/04/25 2111 -- -- -- -- -- -- -- -- -- -- 5 05/04/25 2000 -- -- -- -- -- -- -- -- -- 15 --    05/04/25 15:17:47 99.5 °F (37.5 °C) 94 17 112/67 82 92 %  -- -- -- -- --    05/04/25 1328 -- -- -- -- -- -- -- -- -- -- 5 05/04/25 1202 -- -- -- -- -- -- -- -- -- 15 --    05/04/25 0854 -- -- -- -- -- -- -- -- -- -- 7 05/04/25 0800 -- -- -- -- -- -- -- -- None (Room air) 15 --    05/04/25 07:34:49 97.7 °F (36.5 °C) 99 16 103/56 72 97 % -- -- -- -- --    05/04/25 0516 -- -- -- -- -- -- -- -- -- -- 3    05/04/25 0400 -- -- -- -- -- -- -- -- -- 15 --    05/04/25 0225 -- -- -- -- -- -- -- -- -- -- 5 05/04/25 0000 -- -- -- -- -- -- -- -- -- 15 --    05/03/25 23:29:15 -- 90 -- 109/54 72 93 % -- -- -- -- --    05/03/25 22:55:22 99 °F (37.2 °C) 91 18 93/50 64 93 % -- -- -- -- --    05/03/25 2122 -- -- -- -- -- -- -- -- -- -- 5 05/03/25 21:18:44 98.2 °F (36.8 °C) 91 -- 109/63 78 95 % -- -- -- -- --    05/03/25 2000 -- -- -- -- -- -- -- -- None (Room air) 15 No Pain    05/03/25 1707 -- -- -- -- -- -- -- -- -- -- 8 05/03/25 1600 -- -- -- -- -- -- -- -- -- 15 --    05/03/25 15:50:13 98.4 °F (36.9 °C) 94 -- 108/63 78 96 % -- -- -- -- --    05/03/25 1200 -- -- -- -- -- -- -- -- -- 15 --    05/03/25 1030 -- -- -- -- -- -- -- -- -- -- 6    05/03/25 07:44:22 97.8 °F (36.6 °C) 83 16 129/98 108 94 % -- -- -- -- --    05/03/25 0724 -- -- -- -- -- -- -- -- None (Room air) 15 --    05/03/25 0632 -- -- -- -- -- -- -- -- -- -- 5 05/03/25 0540 -- -- -- -- -- -- -- -- -- -- 5 05/03/25 0209 -- -- -- -- -- -- -- -- -- -- 5 05/03/25 0033 -- -- -- -- -- -- -- -- -- 15 --          Weight (last 2 days)       Date/Time Weight    05/06/25 0551 98.5 (217.15)    05/04/25 23:30:54 102 (224.65)            Pertinent Labs/Diagnostic Results:   Radiology:  XR spine lumbar 2 or 3 views injury   Final Interpretation by Chris Huerta MD (05/02 7895)      Satisfactory postoperative alignment, status post lumbar fusion revision.         Computerized Assisted Algorithm (CAA) may have been used to analyze all applicable images.         Workstation performed: [id number]            Cardiology:  No orders to display     GI:  No orders to display           Results from last 7 days   Lab Units 05/06/25  0534 05/05/25  0434 05/04/25  0459 05/03/25  0446 05/02/25  0521   WBC Thousand/uL 7.92 8.39 11.41* 10.18* 15.55*   HEMOGLOBIN g/dL 9.0* 9.4* 9.5* 9.4* 10.3*   HEMATOCRIT % 28.5* 30.5* 31.0* 30.2* 33.4*   PLATELETS Thousands/uL 224 211 195 198 259   TOTAL NEUT ABS Thousands/µL  --  4.65 7.15  --  12.85*         Results from last 7 days   Lab Units 05/06/25  0534 05/05/25  0434 05/04/25  0459 05/03/25  0446 05/02/25  0521   SODIUM mmol/L 138 139 136 137 137   POTASSIUM mmol/L 3.9 3.9 3.6 3.9 4.6   CHLORIDE mmol/L 101 103 102 105 108   CO2 mmol/L 28 25 27 23 19*   ANION GAP mmol/L 9 11 7 9 10   BUN mg/dL 9 10 11 11 12   CREATININE mg/dL 0.68 0.56* 0.64 0.74 0.69   EGFR ml/min/1.73sq m 90 96 92 84 90   CALCIUM mg/dL 8.5 8.6 8.4 8.5 8.5   MAGNESIUM mg/dL 1.9 2.0 1.6*  --   --          Results from last 7 days   Lab Units 05/06/25  0809 05/05/25  2141 05/05/25  1658 05/05/25  1128 05/05/25  0736 05/04/25  2106 05/04/25  1525 05/04/25  1030 05/04/25  0611 05/03/25  2056 05/03/25  1549 05/03/25  1050   POC GLUCOSE mg/dl 112 158* 129 167* 111 156* 142* 127 121 154* 139 126     Results from last 7 days   Lab Units 05/06/25  0534 05/05/25  0434 05/04/25  0459 05/03/25  0446 05/02/25  0521   GLUCOSE RANDOM mg/dL 124 117 115 119 141*     Network Utilization Review Department  ATTENTION: Please call with any questions or concerns to [phone] and carefully listen to the prompts so that you are directed to the right person. All voicemails are confidential.   For Discharge needs, contact Care Management DC Support Team at [phone] opt. [phone]  Send all requests for admission clinical reviews, approved or denied determinations and any other requests to dedicated fax number below belonging to the campus where the patient is receiving treatment. List of dedicated fax numbers for the Facilities:  FACILITY NAME  UR FAX NUMBER   ADMISSION DENIALS (Administrative/Medical Necessity) [phone]   DISCHARGE SUPPORT TEAM (NETWORK) [phone]   PARENT CHILD HEALTH (Maternity/NICU/Pediatrics) [phone]   [facility] [phone]   [facility] [phone]   [facility] [phone]   [facility] [phone]   [facility] [phone]   [facility] [phone]   [facility] [phone]   [facility] [phone]   [facility] [phone]   Count includes the [facility] [phone]   [facility] [phone]   [facility] [phone]

## 2025-05-06 NOTE — ARC ADMISSION
Reviewed updates - patient remains acute rehab appropriate, and medically stable for ARC admission.    Spoke with Daniela Savannah, [employer] workers comp CM, who confirmed that workers comp claim is an open/active billable claim, and no authorization is required. Billing is to be submitted to [employer].     Patient will admit to [facility] room 261 with transport time TBD. Report can be called to (P): [phone]. CM has been updated.

## 2025-05-06 NOTE — PROGRESS NOTES
Progress Note - Hospitalist   Name: Vesna Langston 65 y.o. female I MRN: [id number]  Unit/Bed#: -01 I Date of Admission: 5/1/2025   Date of Service: 5/6/2025 I Hospital Day: 5    Assessment & Plan  Status post lumbar spinal fusion  Presented w/ back pain s/p B/L navigated revision L3-S1 laminectomy/decompression, revision L3-S1 posterior instrumented spinal fusion with TLIF on 05/1/25  Management per orthopedic surgery  WBAT BLLE  PT/OT eval  LSO brace OOB  Remove DARWIN drain when appropriate per orthopedics  C/w analgesia  PT/OT following  HFrEF (heart failure with reduced ejection fraction) ([facility])  Wt Readings from Last 3 Encounters:   05/06/25 98.5 kg (217 lb 2.5 oz)   04/24/25 94.3 kg (208 lb)   04/23/25 94.8 kg (209 lb)     Clinically, mild hypervolemia w/ +1 ankle edema & objective abdominal distention; no respiratory distress or PND   Home regimen: Jardiance 25 mg q day, Entresto 49-51 mg BID, PRN Lasix 40 to 80 mg   PTA Entresto on hold w/ soft BP's - resume at discharge  PTA Jardiance 25 mg q day, resume at d/c   S/p Lasix 40 mg PO BID x 2 doses on 5/5. Lasix 80mg po x 1 dose 5/6. Continue prn lasix per patient.   C/w daily wts   Sodium restrictive diet   Anemia  Recent Labs     05/04/25  0459 05/05/25  0434 05/06/25  0534   HGB 9.5* 9.4* 9.0*     Baseline Hgb 11-12   Etiology multifactorial in setting of ABLA w/ recent surgery & dilutional component w/ IVFs   Trend CBC  Continue monitoring DARWIN drain output   Coronary artery disease involving native coronary artery of native heart  S/p NATHAN in 03/2024  C/w PTA Toprol-XL 50 mg qhs, Imdur 30 mg BID, Lipitor 80 mg qhs   PTA 75 mg q day resumed on 05/05   Other pulmonary embolism without acute cor pulmonale, unspecified chronicity (HCC)  C/w eliquis   Atrial flutter, paroxysmal (HCC)  C/w Toprol XL & Tikosyn 125 mcg BID - rate controlled   Eliquis restarted.    Acquired hypothyroidism  C/w levothyroxine  TSH therapeutic in 04/2025  Class 1 obesity due to excess  calories with serious comorbidity and body mass index (BMI) of 34.0 to 34.9 in adult  Affects all aspects of life  Encourage lifestyle/dietary modifications  Type 2 diabetes mellitus, without long-term current use of insulin ([facility])  Lab Results   Component Value Date    HGBA1C 5.9 (H) 03/28/2025     Recent Labs     05/05/25  1128 05/05/25  1658 05/05/25  2141 05/06/25  0809   POCGLU 167* 129 158* 112     Blood Sugar Average: Last 72 hrs:  (P) 135  A1c at goal   Home regimen: Jardiance, Ozempic - severe reflux symptoms - on hold, resume at d/c  Leukocytosis (Resolved: 5/6/2025)  Recent Labs     05/04/25  0459 05/05/25  0434 05/06/25  0534   WBC 11.41* 8.39 7.92     Likely reactive in the setting of recent procedure  Remains afebrile; no s/sx of infxn    Drug-induced constipation  Continue bowel regimen with lactulose and senna/colace     VTE Pharmacologic Prophylaxis:   Moderate Risk (Score 3-4) - Pharmacological DVT Prophylaxis Ordered: apixaban (Eliquis).    Mobility:   Basic Mobility Inpatient Raw Score: 18  JH-HLM Goal: 6: Walk 10 steps or more  JH-HLM Achieved: 7: Walk 25 feet or more  JH-HLM Goal achieved. Continue to encourage appropriate mobility.    Patient Centered Rounds: I performed bedside rounds with nursing staff today.   Discussions with Specialists or Other Care Team Provider: case management    Education and Discussions with Family / Patient: Patient declined call to .     Current Length of Stay: 5 day(s)  Current Patient Status: Inpatient   Discharge Plan: [facility] is following this patient on consult. They are medically stable for discharge when deemed appropriate by primary service.    Code Status: Prior    Subjective   States her abdomen still feels bloated as if she is retaining fluid. Edema is better. No BM in 3 days. Passing gas.     Objective :  Temp:  [98.3 °F (36.8 °C)-98.4 °F (36.9 °C)] 98.3 °F (36.8 °C)  HR:  [] 88  BP: ()/(49-77) 130/62  Resp:  [16-19] 16  SpO2:   [92 %-96 %] 95 %  O2 Device: None (Room air)    Body mass index is 33.02 kg/m².     Input and Output Summary (last 24 hours):     Intake/Output Summary (Last 24 hours) at 5/6/2025 1207  Last data filed at 5/6/2025 1102  Gross per 24 hour   Intake 600 ml   Output 3220 ml   Net -2620 ml       Physical Exam  Constitutional:       Appearance: Normal appearance.   Cardiovascular:      Rate and Rhythm: Normal rate and regular rhythm.      Heart sounds: No murmur heard.  Pulmonary:      Effort: Pulmonary effort is normal.      Breath sounds: Normal breath sounds.   Abdominal:      General: Bowel sounds are normal. There is no distension.      Palpations: Abdomen is soft.      Tenderness: There is no abdominal tenderness.   Skin:     General: Skin is warm and dry.   Neurological:      General: No focal deficit present.      Mental Status: She is alert and oriented to person, place, and time.   Psychiatric:         Mood and Affect: Mood normal.           Lines/Drains:  Lines/Drains/Airways       Active Status       Name Placement date Placement time Site Days    Closed/Suction Drain Left Back Bulb 19 Fr. 05/01/25  1615  Back  4                      Telemetry:  Telemetry Orders (From admission, onward)               24 Hour Telemetry Monitoring  Continuous x 24 Hours (Telem)        Comments: Patient is Tikosyn   Expiring   Question:  Reason for 24 Hour Telemetry  Answer:  Decompensated CHF- and any one of the following: continuous diuretic infusion or total diuretic dose >200 mg daily, associated electrolyte derangement (I.e. K < 3.0), inotropic drip (continuous infusion), hx of ventricular arrhythmia, or new EF < 35%                     Telemetry Reviewed: Normal Sinus Rhythm  Indication for Continued Telemetry Use: Arrthymias requiring medical therapy               Lab Results: I have reviewed the following results:   Results from last 7 days   Lab Units 05/06/25  0534 05/05/25  0434   WBC Thousand/uL 7.92 8.39   HEMOGLOBIN  g/dL 9.0* 9.4*   HEMATOCRIT % 28.5* 30.5*   PLATELETS Thousands/uL 224 211   SEGS PCT %  --  56   LYMPHO PCT %  --  28   MONO PCT %  --  11   EOS PCT %  --  4     Results from last 7 days   Lab Units 05/06/25  0534   SODIUM mmol/L 138   POTASSIUM mmol/L 3.9   CHLORIDE mmol/L 101   CO2 mmol/L 28   BUN mg/dL 9   CREATININE mg/dL 0.68   ANION GAP mmol/L 9   CALCIUM mg/dL 8.5   GLUCOSE RANDOM mg/dL 124         Results from last 7 days   Lab Units 05/06/25  0809 05/05/25  2141 05/05/25  1658 05/05/25  1128 05/05/25  0736 05/04/25  2106 05/04/25  1525 05/04/25  1030 05/04/25  0611 05/03/25  2056 05/03/25  1549 05/03/25  1050   POC GLUCOSE mg/dl 112 158* 129 167* 111 156* 142* 127 121 154* 139 126               Recent Cultures (last 7 days):           Last 24 Hours Medication List:     Current Facility-Administered Medications:     acetaminophen (TYLENOL) tablet 650 mg, Q6H MONA    aluminum-magnesium hydroxide-simethicone (MAALOX) oral suspension 30 mL, Q6H PRN    apixaban (ELIQUIS) tablet 5 mg, Q12H    atorvastatin (LIPITOR) tablet 80 mg, QPM    calcium carbonate (TUMS) chewable tablet 1,000 mg, Daily PRN    clopidogrel (PLAVIX) tablet 75 mg, Daily    docusate sodium (COLACE) capsule 100 mg, BID    dofetilide (TIKOSYN) capsule 125 mcg, Q12H MONA    fluticasone (FLONASE) 50 mcg/act nasal spray 1 spray, Daily    furosemide (LASIX) tablet 40 mg, BID PRN    furosemide (LASIX) tablet 80 mg, BID PRN    insulin lispro (HumALOG/ADMELOG) 100 units/mL subcutaneous injection 1-5 Units, HS    insulin lispro (HumALOG/ADMELOG) 100 units/mL subcutaneous injection 1-6 Units, TID AC **AND** Fingerstick Glucose (POCT), TID AC    isosorbide mononitrate (IMDUR) 24 hr tablet 30 mg, BID    lactulose (CHRONULAC) oral solution 30 g, Daily PRN    levothyroxine tablet 75 mcg, Daily    metoprolol succinate (TOPROL-XL) 24 hr tablet 50 mg, HS    ondansetron (ZOFRAN) injection 4 mg, Q6H PRN    oxyCODONE (ROXICODONE) immediate release tablet 10 mg, Q4H  PRN    oxyCODONE (ROXICODONE) IR tablet 5 mg, Q4H PRN    pantoprazole (PROTONIX) EC tablet 40 mg, BID AC    pregabalin (LYRICA) capsule 75 mg, TID    senna (SENOKOT) tablet 8.6 mg, Daily    Administrative Statements   Today, Patient Was Seen By: Rachael Bryant PA-C      **Please Note: This note may have been constructed using a voice recognition system.**

## 2025-05-06 NOTE — ASSESSMENT & PLAN NOTE
Lab Results   Component Value Date    HGBA1C 5.9 (H) 03/28/2025       Recent Labs     05/05/25  2141 05/06/25  0809 05/06/25  1211 05/06/25  1656   POCGLU 158* 112 144* 158*       Blood Sugar Average: Last 72 hrs:  (P) 158  Home: jardiance, Ozempic (held in acute due to reflux)  SSI  Restart home meds when able  Management per IM

## 2025-05-06 NOTE — ASSESSMENT & PLAN NOTE
S/p B/L navigated revision L3-S1 laminectomy/decompression, revision L3-S1 posterior instrumented spinal fusion with TLIF on 05/1/25  WBAT LLE  LSO brace OOB  F/u Dr Mcrae 2 weeks (5/15)  Keep dressing intact and incision dry until 2 week post op visit  DVT ppx: eliquis and plavix  Monitor incision  PT/OT evaluate and treat

## 2025-05-06 NOTE — PROGRESS NOTES
PHYSICAL MEDICINE AND REHABILITATION   PREADMISSION ASSESSMENT     Projected IGC and Rehabilitation Diagnoses:  Impairment of mobility, safety and Activities of Daily Living (ADLs) due to Neurologic Conditions:  03.9  Other Neurologic Disorder    Etiologic Dx: Lumbar Radiculopathy   Date of Onset: 5/1/2025   Date of surgery: 5/1/2025 Exploration of fusion mass, L3-S1; Removal of hardware, L3-S1; Arthrodesis, L5-S1 TLIF; Laminotomy/facetectomy, L5-S1; Arthrodesis, T4-5; Arthrodesis, T3-4; Posterior segmental instrumentation/pedicle screw fixation,L3-S1    PATIENT INFORMATION  Name: Vesna Langston Phone #: [phone] (home)   Address: [address] [place] [id number]  YOB: 1960 Age: 65 y.o. #   Marital Status:   Ethnicity:   Employment Status: currently employed  Extended Emergency Contact Information  Primary Emergency Contact: Lissett Langston  Address: [address] [place] [address]  Mobile Phone: [phone]  Relation: Daughter  Secondary Emergency Contact: Ovidio Langston  Home Phone: [phone]  Mobile Phone: [phone]  Relation: Son  Advance Directive: Prior Code Status - advanced directive as filed     INSURANCE/COVERAGE:     Primary Payer: Workers Comp through [employer] Secondary Payer: Blue [employer]/Misc Blue [employer]   Adjustor: Jacqueline Stamp Payer Contact:   Contact Phone: [phone]  Contact Fax: [phone]    CM: Daniela Nuñez  Phone: [phone] Contact Phone:     **5/6/25 - Spoke with María Elena Marquez workers comp CM, who confirmed that workers comp claim is an open/active billable claim, and no authorization is required. Billing is to be submitted to [employer].     Medical Record #: [id number]    REFERRAL SOURCE:   Referring provider: Charles Mcrae MD  Referring facility: [facility]  Room: /[facility]  PCP: Melissa Montoya DO PCP phone number:  [phone]    MEDICAL INFORMATION  HPI: Patient is a 65 year old female that presented to [facility] on 5/1/2025 for elective Orthopedics surgical intervention due to lumbar radiculopathy. Patient with history of Back Pain, Left Radicular Leg Pain, and Ambulatory Dysfunction and imaging findings most notable for lumbar spondylosis, L3-S1 fusion construct with evidence of pseudoarthrosis. presenting for initial visit with chief complaint of low back pain - referred by Dr. Pete. This is a second opinion requested by workers comp. Pain began 2 years ago after an injury at work. She received a fusion of L3-S1 in 2023. 2 weeks after she returned to work she suffered a twisting injury which caused her to return to her surgeon. X-ray revealed loosening of multiple screws in her lumbar spine. She was offered an SI injection to help with low back pain. 5 hours after the injection she suffered a heart attack in which a she received an ICD and stent. On presentation to Ortho office, she claims that she cannot feel her left leg. She attempts to walk on a treadmill for her cardiac rehab in which her left leg will give out 10-15 times during her hour long rehab. She reports that she is currently in physical therapy that is providing no relief. Describes pain as crushing in nature. Located in low back. + numbness . + burning. Recommended for surgical intervention. On 5/1, she went to the OR with Ortho for Exploration of fusion mass, L3-S1; Removal of hardware, L3-S1; Arthrodesis, L5-S1 TLIF; Laminotomy/facetectomy, L5-S1; Arthrodesis, T4-5; Arthrodesis, T3-4; Posterior segmental instrumentation/pedicle screw fixation,L3-S1; EBL minimal, DARWIN drain intact. Postoperatively, she was cleared for WBAT to B/L LE, LSO brace when OOB, lumbar spinal precautions. She was initiated on SQ Heparin for DVT prophylaxis. JILLIAN was consulted re: postop medical management. She was with noted B/L LE edema, and was initiated on PO  Lasix for 3 doses total, with recommendations for prn Lasix. Patient is overall hemodynamically stable and medically cleared for discharge to [facility]. PT/OT therapies were consulted, as well as patient's case reviewed by [facility] physician, and they are recommending patient for inpatient Acute Rehab. She has demonstrated that she can tolerate and participate in 3 hours of therapy per day.      Past Medical History:   Past Surgical History:   Allergies:     Past Medical History:   Diagnosis Date    Acute respiratory insufficiency 03/22/2024    Allergic     Anxiety 4/24    Arrhythmia 3/22/24    Atrial fibrillation (HCC) 3/22/24    Back pain 2022    Bradycardia 3/22/24    Breast cyst 2000    Chronic HFrEF (heart failure with reduced ejection fraction) ([facility])     Clotting disorder ([facility]) 4/1/24    Coronary artery disease     COVID-19 virus infection 11/28/2022    Depression 4/24    Diabetes mellitus ([facility])     Disease of thyroid gland 4/09/2024    Environmental and seasonal allergies 1/1/1960    GERD (gastroesophageal reflux disease) 10/09    GI (gastrointestinal bleed) 4/1/24    Heart disease 3/24    History of placement of internal cardiac defibrillator 3/27/24    Hyperlipidemia     Hypoglycemia     ICD (implantable cardioverter-defibrillator) in place     Ischemic cardiomyopathy     Lactic acidosis 03/22/2024    Migraine 1980    Morning headache 1980    Myocardial infarction ([facility]) 3/22/2024    Nausea & vomiting 04/03/2025    Otitis media 1966    Pacemaker 3/27/24    Peptic ulceration 4/2024    Seasonal allergies     Sinus problem 1970    ST elevation myocardial infarction involving left anterior descending (LAD) coronary artery ([facility]) 03/22/2024    Tobacco abuse     Visual impairment 1967    Past Surgical History:   Procedure Laterality Date    ADENOIDECTOMY      APPENDECTOMY      APPENDECTOMY LAPAROSCOPIC N/A 05/08/2024    Procedure: APPENDECTOMY LAPAROSCOPIC;  Surgeon: Lauryn Ullrich, DO;  Location: [facility];  Service:  General    BACK SURGERY      BREAST EXCISIONAL BIOPSY Right 2000    cyst removed- benign    CARDIAC CATHETERIZATION N/A 2024    Procedure: Cardiac pci;  Surgeon: Gabriel Myers MD;  [facility];  Service: Cardiology    CARDIAC CATHETERIZATION N/A 2024    Procedure: Cardiac Coronary Angiogram;  Surgeon: Gabriel Myers MD;  [facility];  Service: Cardiology    CARDIAC CATHETERIZATION N/A 2024    Procedure: Cardiac PCI AMI;  Surgeon: Gabriel Myers MD;  [facility];  Service: Cardiology    CARDIAC CATHETERIZATION N/A 2024    Procedure: Cardiac IVUS;  Surgeon: Gabriel Myres MD;  [facility];  Service: Cardiology    CARDIAC DEFIBRILLATOR PLACEMENT      CARDIAC ELECTROPHYSIOLOGY PROCEDURE N/A 2024    Procedure: Cardiac icd implant;  Surgeon: Jeffy Lama MD;  [facility];  Service: Cardiology     SECTION      COLONOSCOPY      ECTOPIC PREGNANCY SURGERY      INSERT / REPLACE / REMOVE PACEMAKER      LUMBAR FUSION Bilateral 2025    Procedure: Navigated revision L3-S1 laminectomy/decompression, revision L3-S1 posterior instrumented spinal fusion with TLIF;  Surgeon: Charles Mcrae MD;  Location: [facility];  Service: Orthopedics    MASS EXCISION Left 10/18/2024    Procedure: EXCISION BIOPSY TISSUE LESION/MASS UPPER EXTREMITY - Left index finger;  Surgeon: Leanrdo Campos MD;  Location:  MAIN OR;  Service: Orthopedics    SEPTOPLASTY      SPINE SURGERY  23    TONSILLECTOMY      TONSILLECTOMY AND ADENOIDECTOMY  1964    TRIGGER POINT INJECTION      UPPER GASTROINTESTINAL ENDOSCOPY  24     Allergies   Allergen Reactions    Fish-Derived Products - Food Allergy Anaphylaxis     Cannot have all seafood products    Shellfish-Derived Products - Food Allergy Anaphylaxis    Azithromycin Hives and Rash         Medical/functional conditions requiring inpatient rehabilitation: lumbar radiculopathy  s/p extensive surgical intervention, LSO brace when OOB, lumbar spinal precautions, acute pain, anemia, CHF, DM, constipation, impaired mobility and self care, decreased strength/endurance, impaired balance    Risk for medical/clinical complications: risk for falls, risk for skin breakdown, risk for uncontrolled pain, risk for infection, risk for DVT/PE, risk for cardiac event, risk for hypo/hypertensive episodes, risk for hypo/hyperglycemia episodes    Comorbidities/Surgeries in the last 100 days:  CHF, CAD s/p stent, PE on Eliquis, Aflutter, hypothyroid, DM, anxiety, depression, GERD, MI, HLD, ischemic cardiomyopathy s/p ICD, migraines    5/1/2025 Exploration of fusion mass, L3-S1; Removal of hardware, L3-S1; Arthrodesis, L5-S1 TLIF; Laminotomy/facetectomy, L5-S1; Arthrodesis, T4-5; Arthrodesis, T3-4; Posterior segmental instrumentation/pedicle screw fixation,L3-S1    CURRENT VITAL SIGNS:   Temp:  [98.3 °F (36.8 °C)-98.4 °F (36.9 °C)] 98.3 °F (36.8 °C)  HR:  [] 88  BP: ()/(49-77) 130/62  Resp:  [16-19] 16  SpO2:  [92 %-96 %] 95 %  O2 Device: None (Room air)   Intake/Output Summary (Last 24 hours) at 5/6/2025 1313  Last data filed at 5/6/2025 1102  Gross per 24 hour   Intake 600 ml   Output 3220 ml   Net -2620 ml        LABORATORY RESULTS:      Lab Results   Component Value Date    HGB 9.0 (L) 05/06/2025    HCT 28.5 (L) 05/06/2025    WBC 7.92 05/06/2025     Lab Results   Component Value Date    BUN 9 05/06/2025    BUN 17 08/02/2022    K 3.9 05/06/2025    K 4.1 08/02/2022     05/06/2025     08/02/2022    GLUCOSE 168 (H) 04/07/2024    CREATININE 0.68 05/06/2025    CREATININE 0.70 08/02/2022     Lab Results   Component Value Date    PROTIME 14.7 04/10/2025    INR 1.11 04/10/2025    INR 1 07/31/2022        DIAGNOSTIC STUDIES:  XR spine lumbosacral 1 view  Result Date: 5/2/2025  Impression: Fluoroscopy provided for procedure guidance. Please refer to the separate procedure note for additional  details. Workstation performed: [id number]     XR spine lumbar 2 or 3 views injury  Result Date: 2025  Impression: Satisfactory postoperative alignment, status post lumbar fusion revision. Computerized Assisted Algorithm (CAA) may have been used to analyze all applicable images. Workstation performed: [id number]       PRECAUTIONS/SPECIAL NEEDS:  Tobacco:   Social History     Tobacco Use   Smoking Status Former    Current packs/day: 0.00    Average packs/day: 1 pack/day for 24.2 years (24.2 ttl pk-yrs)    Types: Cigarettes    Start date: 2000    Quit date: 3/22/2024    Years since quittin.1    Passive exposure: Never   Smokeless Tobacco Never   Tobacco Comments    Smoked 0.5-1 ppd; quit in 2024.    , Alcohol:    Social History     Substance and Sexual Activity   Alcohol Use Not Currently    Alcohol/week: 1.0 standard drink of alcohol    Types: 1 Shots of liquor per week    Comment: Every 2or 3months   , Splints/Braces: LSO brace when OOB, Anticoagulation:  clopidogrel 75 mg orally every day and Eliquis , Blood Sugar Management: as per MD recommendations, Edema Management, Safety Concerns, Pain Management, Dietary Restrictions: Cardiac Diabetic 4gm Na diet, Visually Impaired, Language Preference: English, Lumbar Spinal Precautions and Fall Precautions.    MEDICATIONS:     Current Facility-Administered Medications:     acetaminophen (TYLENOL) tablet 650 mg, 650 mg, Oral, Q6H MONA, Monserrat Beltran PA-C, 650 mg at 25 0537    aluminum-magnesium hydroxide-simethicone (MAALOX) oral suspension 30 mL, 30 mL, Oral, Q6H PRN, Monserrat Beltran PA-C, 30 mL at 25 1023    apixaban (ELIQUIS) tablet 5 mg, 5 mg, Oral, Q12H, Brandon Chirinos MD, 5 mg at 25 0900    atorvastatin (LIPITOR) tablet 80 mg, 80 mg, Oral, QPM, Monserrat Beltran PA-C, 80 mg at 25 1649    calcium carbonate (TUMS) chewable tablet 1,000 mg, 1,000 mg, Oral, Daily PRN, Monserrat Beltran PA-C    clopidogrel  (PLAVIX) tablet 75 mg, 75 mg, Oral, Daily, Mary Jo Elias PA-C, 75 mg at 05/06/25 1016    docusate sodium (COLACE) capsule 100 mg, 100 mg, Oral, BID, Monserrat Beltran PA-C, 100 mg at 05/06/25 0800    dofetilide (TIKOSYN) capsule 125 mcg, 125 mcg, Oral, Q12H MONA, Monserrat Beltran PA-C, 125 mcg at 05/06/25 0800    fluticasone (FLONASE) 50 mcg/act nasal spray 1 spray, 1 spray, Nasal, Daily, Monserrat Beltran PA-C, 1 spray at 05/06/25 1018    furosemide (LASIX) tablet 40 mg, 40 mg, Oral, BID PRN, Rachael Bryant PA-C    furosemide (LASIX) tablet 80 mg, 80 mg, Oral, BID PRN, Rachael Bryant PA-C    insulin lispro (HumALOG/ADMELOG) 100 units/mL subcutaneous injection 1-5 Units, 1-5 Units, Subcutaneous, HS, Fiorella Matthews PA-C, 1 Units at 05/05/25 2146    insulin lispro (HumALOG/ADMELOG) 100 units/mL subcutaneous injection 1-6 Units, 1-6 Units, Subcutaneous, TID AC, 1 Units at 05/05/25 1649 **AND** Fingerstick Glucose (POCT), , , TID AC, Fiorella Matthews PA-C    isosorbide mononitrate (IMDUR) 24 hr tablet 30 mg, 30 mg, Oral, BID, Monserrat Beltran PA-C, 30 mg at 05/06/25 0900    lactulose (CHRONULAC) oral solution 30 g, 30 g, Oral, Daily PRN, MAHI De Paz, 30 g at 05/05/25 2041    levothyroxine tablet 75 mcg, 75 mcg, Oral, Daily, Monserrat Beltran PA-C, 75 mcg at 05/06/25 0538    metoprolol succinate (TOPROL-XL) 24 hr tablet 50 mg, 50 mg, Oral, HS, Monserrat Beltran PA-C, 50 mg at 05/01/25 2138    ondansetron (ZOFRAN) injection 4 mg, 4 mg, Intravenous, Q6H PRN, Monserrat Beltran PA-C    oxyCODONE (ROXICODONE) immediate release tablet 10 mg, 10 mg, Oral, Q4H PRN, Monserrat Beltran PA-C, 10 mg at 05/04/25 0854    oxyCODONE (ROXICODONE) IR tablet 5 mg, 5 mg, Oral, Q4H PRN, Monserrat Beltran PA-C, 5 mg at 05/06/25 1023    pantoprazole (PROTONIX) EC tablet 40 mg, 40 mg, Oral, BID AC, Monserrat Beltran PA-C, 40 mg at 05/06/25 0800    pregabalin (LYRICA)  capsule 75 mg, 75 mg, Oral, TID, Monserrat Beltran PA-C, 75 mg at 05/06/25 0800    senna (SENOKOT) tablet 8.6 mg, 1 tablet, Oral, Daily, Monserrat Beltran PA-C, 8.6 mg at 05/06/25 1016    SKIN INTEGRITY:   Incision: healing well, no significant drainage, no dehiscence, no significant erythema, back incision with silver dressing    PRIOR LEVEL OF FUNCTION:  She lives in a(n) single family home  Vesna Langston is  and lives alone.  Self Care: Independent, Indoor Mobility: Modified Independent, Stairs (in/outdoor): Modified Independent, and Cognition: Independent  Prior to patient's admission, patient was fully Independent with ADLs and received assistance with IADLs ( every Friday). She was driving. She was Modified Independent with use of Rollator vs SPC for mobility.     FALLS IN THE LAST 6 MONTHS: >10    HOME ENVIRONMENT:  The living area: can live on one level  There are 2 steps to enter the home.    The patient will not have 24 hour supervision/physical assistance available upon discharge.  Has 2 local sons and a daughter. Daughter has offered for patient to stay with her at d/c if needed. Her home is a split level with 6 + 6 to main living area where kitchen and family room's are. Pt could enter through bottom level with no AYESHA and would have a walk in shower with bedroom on that level if needed. Will not have a true 24/7 but family can help before/after work shifts.     PREVIOUS DME:  Equipment in home (previous DME): Grab Bars, Rolling Walker, Single Point Cane, and Raised Toilet, Built-in Shower Seat, Rollator    FUNCTIONAL STATUS:  Physical Therapy Occupational Therapy Speech Therapy   5/5/2025, per PT    Cognition   Overall Cognitive Status WFL   Arousal/Participation Alert;Responsive;Cooperative   Attention Within functional limits   Orientation Level Oriented X4   Memory Within functional limits   Following Commands Follows all commands and directions without difficulty    Comments pt pleasant and cooperative   Subjective   Subjective pt agreeable to mobilize   Bed Mobility   Rolling L 5  Supervision   Supine to Sit 5  Supervision   Additional items HOB elevated;Increased time required   Sit to Supine Unable to assess   Additional Comments pt OOB in chair at end of session   Transfers   Sit to Stand 4  Minimal assistance  (CGA)   Additional items Assist x 1;Armrests;Increased time required   Stand to Sit 5  Supervision   Additional items Assist x 1;Increased time required;Verbal cues   Toilet transfer 5  Supervision   Additional items Increased time required;Verbal cues;Standard toilet   Additional Comments c RW. x3 STS   Ambulation/Elevation   Gait pattern Decreased foot clearance;Inconsistent kana;L Foot drag;Short stride;Excessively slow  (h/o L foot drag)   Gait Assistance 4  Minimal assist  (CGA +chair follow)   Additional items Assist x 1;Verbal cues   Assistive Device Rolling walker   Distance 120'+20'+20'+120'+10'   Stair Management Assistance 4  Minimal assist   Additional items Assist x 1;Verbal cues;Increased time required   Stair Management Technique One rail R;Step to pattern   Number of Stairs 2   Ambulation/Elevation Additional Comments seated rest breaks throughout   Balance   Static Sitting Good   Dynamic Sitting Fair +   Static Standing Fair   Dynamic Standing Fair -   Ambulatory Poor +   Endurance Deficit   Endurance Deficit Yes   Endurance Deficit Description pt limited by pain, fatigue and generalized weakness   Activity Tolerance   Activity Tolerance Patient limited by fatigue;Patient limited by pain   Medical Staff Made Aware OT Sofya   Nurse Made Aware yes-RN cleared   Assessment   Prognosis Good   Problem List Decreased strength;Decreased endurance;Decreased mobility;Pain;Orthopedic restrictions   Assessment Pt pleasant and agreeable to participate in PT session. Completes mobility and therapeutic activity as outlined above. LSO donned EOB prior to  mobilization. Pt able to improve ambulation distance with seated rest breaks as needed. Attempts to complete stair trial, pt able to negotiate 2 steps however limited by discomfort and fatigue. Pt able to recall spinal precautions. Pt requires CGA during mobility with cues for safety. Pt progressing towards her goals and will continue to benefit from skilled acute PT services to address deficits and promote mobility. Pt is very motivated to improve mobility and lives alone in 3  with multiple FFOS. Pt left upright in chair with chair alarm donned, LSO in place, call bell and personal items within reach and all needs met.    5/5/2025, per OT    ADL   Grooming Assistance 5  Supervision/Setup   Grooming Deficit Setup;Increased time to complete;Brushing hair   Grooming Comments Pt completes grooming w/ set up seated in bedside chair.   UB Bathing Assistance 4  Minimal Assistance   UB Bathing Deficit Setup;Supervision/safety;Increased time to complete;Chest;Right arm;Left arm;Abdomen;Perineal area   UB Bathing Comments Pt performs UB bathing w/ min a seated in bedside chair.   LB Bathing Assistance 3  Moderate Assistance   LB Bathing Deficit Setup;Increased time to complete;Right lower leg including foot;Left lower leg including foot   LB Bathing Comments Pt requires mod a for LB bathing seated in bedside chair this date.   UB Dressing Assistance 5  Supervision/Setup   UB Dressing Deficit Setup;Increased time to complete;Thread RUE;Thread LUE   UB Dressing Comments Pt requires set up to don hospital gown seated in bedside chair this date.   LB Dressing Assistance 3  Moderate Assistance   LB Dressing Deficit Setup;Supervision/safety;Increased time to complete;Don/doff R sock;Don/doff L sock;Pull up over hips;Thread RLE into underwear;Thread LLE into underwear   LB Dressing Comments Pt requires min -mod a for LB dressing. Pt requires min a for donning underwear and mod a for donning and doffing socks this date.  "  Toileting Assistance  4  Minimal Assistance  (CGA due to low toilet seat in bathroom)   Toileting Deficit Steadying;Verbal cueing;Supervison/safety;Increased time to complete   Toileting Comments Pt requires min a (CGA) for STS from standing w/ walker to toilet.   Functional Standing Tolerance   Time ~10 minutes   Activity Functional mobility   Comments Pt performs community distance w/ CGA and RW.   Bed Mobility   Supine to Sit 5  Supervision   Additional items Increased time required;Verbal cues   Additional Comments Pt greeted supine in bed upon arrival.   Transfers   Sit to Stand 4  Minimal assistance   Additional items Assist x 1;Increased time required;Verbal cues  (First trial of STS pt requires min a and progressed to CGA this date.)   Stand to Sit 5  Supervision   Additional items Increased time required;Verbal cues   Toilet transfer 4  Minimal assistance   Additional items Assist x 1;Increased time required;Verbal cues;Standard toilet  (CGA)   Additional Comments x6 w/ RW   Functional Mobility   Functional Mobility 4  Minimal assistance   Additional Comments Pt completed community distance w/ CGA and RW this date. Pt traveled down hallway to steps and back w/ one seated rest break in between.   Additional items Rolling walker   Toilet Transfers   Toilet Transfer From Rolling walker   Toilet Transfer Type To and from   Toilet Transfer to Standard toilet   Toilet Transfer Technique Stand pivot   Toilet Transfers Contact guard   Toilet Transfers Comments Use of bathroom grab bars   Subjective   Subjective \"My daughter and I agree, I won't go home until I have had rehab. I have a three story home\"   Cognition   Overall Cognitive Status WFL   Arousal/Participation Alert;Responsive;Arousable;Cooperative   Attention Within functional limits   Orientation Level Oriented X4   Memory Within functional limits   Following Commands Follows all commands and directions without difficulty   Comments Pt pleasant and " cooperative this date. Recalls all precautions and has insight into deficits.   Vision   Vision Comments Wears glasses   Activity Tolerance   Activity Tolerance Patient tolerated treatment well   Medical Staff Made Aware RN made aware of tx this date.   Assessment   Assessment Pt seen for OT treatment session on this date focused on ADL retraining, functional transfers and mobility, energy conservation, functional endurance, recall of safety precautions, and patient/caregiver education. Pt was greeted supine in bed upon arrival and cooperative throughout session. Pt requires SUP for bed mobility, grooming, and UB dressing this date. Pt performs STS intially w/ min a x1 from bed and progresses to CGA w/ RW. Ambulates community distance down hallway w/ one seated rest break in bedside chair, performs steps, and returns back to room for ADLs. Performs grooming, bathing, and dressing seated in bedside chair this date. Requires min a for donning underwear and mod a for donning and doffing socks. Following session, pt was left in chair with chair alarm on and all needs within reach. Pt continues to be limited by functional status related to ADLs and transfers, although demonstrates improvements in functional mobility. The patient's raw score on the AM-PAC Daily Activity Inpatient Short Form is 19. A raw score ofgreater than or equal to 19 suggests the patient may benefit from discharge to home. Please refer to the recommendation of the Occupational Therapist for safe discharge planning. Pt would benefit from continued acute OT intervention with plan to continue OT treatment sessions 2-3x per week. Pt is uncomfortable discharging home alone without rehab at this time. Pt reports frequent falls around her home PTA and states she will not return home without attending a rehab facility. Recommend d/c to level one services.    N/A     CARE SCORES:  Self Care:  Eatin: Not applicable  Oral hygiene: 05: Setup or clean-up  assistance  Shower/bathing self: 03: Partial/moderate assistance  Upper body dressin: Supervision or touching  assistance  Lower body dressin: Partial/moderate assistance  Putting on/taking off footwear: 03: Partial/moderate assistance  Toilet hygiene: 04: Supervision or touching  assistance   Transfers:  Roll left and right: 04: Supervision or touching  assistance  Sit to lyin: Not applicable  Lying to sitting on side of bed: 04: Supervision or touching  assistance  Sit to stand: 04: Supervision or touching  assistance  Chair/bed to chair transfer: 04: Supervision or touching  assistance  Toilet transfer: 04: Supervision or touching  assistance  Mobility:  Walk 10 ft: 03: Partial/moderate assistance  Walk 50 ft with two turns: 10: Not attempted due to environmental limitations  Walk 150ft: 03: Partial/moderate assistance    CURRENT GAP IN FUNCTION  Prior to Admission: Functional Status: Patient was not independent with mobility/ambulation, transfers, ADL's, IADL's.    Expected functional outcomes: It is expected that with skilled acute rehabilitation services the patient will progress to Independent for self care and Independent for mobility     Estimated length of stay: 10 to 14 days    Anticipated Post-Discharge Disposition/Treatment  Disposition: Return to previous home/apartment. Vs daughter's home  Outpatient Services: Physical Therapy (PT) and Occupational Therapy (OT)    BARRIERS TO DISCHARGE  Weakness, Pain, Balance Difficulty, Fatigue, Home Accessibility, Caregiver Accessibility, Financial Resources, Equipment Needs, Resource Availability, and Lives Alone    INTERVENTIONS FOR DISCHARGE  Adaptive equipment, Patient/Family/Caregiver Education, Community Resources, Financial Assistance, Arrange DME needs, Medication Changes as per MD recommendations, Therapy exercises, Center of balance support , and Energy conservation education     REQUIRED THERAPY:  Patient will require PT and OT 90 minutes  each per day, five days per week to achieve rehab goals.     REQUIRED FUNCTIONAL AND MEDICAL MANAGEMENT FOR INPATIENT REHABILITATION:  Skin:  There are no pressure sores currently, back incision with silver dressing, Pain Management: Overall pain is well controlled, Deep Vein Thrombosis (DVT) Prophylaxis:  Plavix and Eliquis, Diabetes Management: continue sliding scale insulin, patient to do finger sticks as ordered, SLIM to continue to manage diabetes, and further IM management of additional medical conditions while on ARC, she needs PT/OT intervention, patient/family education and training, possible Neuropsych and Orthopedics consults with any other needed consults prn, nursing medication review and management of bowel/bladder function.    RECOMMENDED LEVEL OF CARE:   Patient is a 65 year old female that presented to [facility] on 5/1/2025 for elective Orthopedics surgical intervention due to lumbar radiculopathy. Patient with history of Back Pain, Left Radicular Leg Pain, and Ambulatory Dysfunction and imaging findings most notable for lumbar spondylosis, L3-S1 fusion construct with evidence of pseudoarthrosis. presenting for initial visit with chief complaint of low back pain - referred by Dr. Pete. This is a second opinion requested by workers comp. Pain began 2 years ago after an injury at work. She received a fusion of L3-S1 in 2023. 2 weeks after she returned to work she suffered a twisting injury which caused her to return to her surgeon. X-ray revealed loosening of multiple screws in her lumbar spine. She was offered an SI injection to help with low back pain. 5 hours after the injection she suffered a heart attack in which a she received an ICD and stent. On presentation to Ortho office, she claims that she cannot feel her left leg. She attempts to walk on a treadmill for her cardiac rehab in which her left leg will give out 10-15 times during her hour long rehab. She reports that she  is currently in physical therapy that is providing no relief. Describes pain as crushing in nature. Located in low back. + numbness . + burning. Recommended for surgical intervention. On 5/1, she went to the OR with Ortho for Exploration of fusion mass, L3-S1; Removal of hardware, L3-S1; Arthrodesis, L5-S1 TLIF; Laminotomy/facetectomy, L5-S1; Arthrodesis, T4-5; Arthrodesis, T3-4; Posterior segmental instrumentation/pedicle screw fixation,L3-S1; EBL minimal, DARWIN drain intact. Postoperatively, she was cleared for WBAT to B/L LE, LSO brace when OOB, lumbar spinal precautions. She was initiated on SQ Heparin for DVT prophylaxis. JILLIAN was consulted re: postop medical management. She was with noted B/L LE edema, and was initiated on PO Lasix for 3 doses total, with recommendations for prn Lasix. Prior to patient's admission, patient was fully Independent with ADLs and received assistance with IADLs ( every Friday). She was driving. She was Modified Independent with use of Rollator vs SPC for mobility. Currently, patient is Min/Supervision with use of RW for gait and transfers, and Min/Supervision for UB ADLs, Mod assist for LB ADLs. Close medical management and PM&R management is recommended at this time while patient is on the ARC. Inpatient acute rehab is recommended for patient to maximize overall strength and mobility upon discharge to home with support of family.

## 2025-05-06 NOTE — CASE MANAGEMENT
Case Management Discharge Planning Note    Patient name Vesna Langston  Location /-01 MRN [id number]  : 1960 Date 2025       Current Admission Date: 2025  Current Admission Diagnosis:Status post lumbar spinal fusion   Patient Active Problem List    Diagnosis Date Noted Date Diagnosed    Leukocytosis 2025     Anemia 2025     Status post lumbar spinal fusion 2025     Other spondylosis with radiculopathy, lumbar region 03/10/2025     Stage 2 chronic kidney disease 2025     Other pulmonary embolism without acute cor pulmonale, unspecified chronicity ([facility]) 2025     Acute on chronic systolic (congestive) heart failure ([facility]) 2025     Obesity, morbid ([facility]) 2025     Type 2 diabetes mellitus with stage 3 chronic kidney disease, without long-term current use of insulin, unspecified whether stage 3a or 3b CKD ([facility]) 2025     Numbness and tingling in left arm 2024     Primary osteoarthritis of right knee 10/08/2024     History of torn meniscus of right knee 10/08/2024     Chronic pain of right knee 10/08/2024     Localized edema 10/02/2024     POP (obstructive sleep apnea) 2024     Class 1 obesity due to excess calories with serious comorbidity and body mass index (BMI) of 34.0 to 34.9 in adult 2024     Ganglion cyst of finger of left hand 08/15/2024     History of pulmonary embolism 2024     History of gastric ulcer 2024     Acquired hypothyroidism 2024     Type 2 diabetes mellitus, without long-term current use of insulin ([facility]) 2024     S/P ICD (internal cardiac defibrillator) procedure 2024     Chronic low back pain 2024     HFrEF (heart failure with reduced ejection fraction) ([facility]) 2024     Ischemic cardiomyopathy 2024     Coronary artery disease involving native coronary artery of native heart 2024     S/P coronary artery stent placement 2024     Atrial flutter, paroxysmal  (HCC) 03/22/2024     Mixed hyperlipidemia 03/22/2024     History of tobacco abuse 03/22/2024     History of sustained ventricular tachycardia 03/22/2024     Back pain 07/31/2022     Sciatica of left side 07/31/2022       LOS (days): 5  Geometric Mean LOS (GMLOS) (days): 4  Days to GMLOS:-0.8     OBJECTIVE:  Risk of Unplanned Readmission Score: 24.97         Current admission status: Inpatient   Preferred Pharmacy:   [facility]/pharmacy #[id number] [place], [place] - [address] [address]  Phone: [phone] Fax: [phone]    [facility]/pharmacy #[id number] [facility] [place] - [address]  [address] [place] [address]  Phone: [phone] Fax: [phone]    Primary Care Provider: Melissa Montoya DO    Primary Insurance: BLUE CROSS  Secondary Insurance: WORK[employer]ENSATION    DISCHARGE DETAILS:    Spoke with pt, agreeable to [facility] [place]    CM rounds-stable for DC    Transport requested via [employer]

## 2025-05-06 NOTE — CASE MANAGEMENT
Case Management Discharge Planning Note    Patient name Vesna Langston  Location /-01 MRN [id number]  : 1960 Date 2025       Current Admission Date: 2025  Current Admission Diagnosis:Status post lumbar spinal fusion   Patient Active Problem List    Diagnosis Date Noted Date Diagnosed    Leukocytosis 2025     Anemia 2025     Status post lumbar spinal fusion 2025     Other spondylosis with radiculopathy, lumbar region 03/10/2025     Stage 2 chronic kidney disease 2025     Other pulmonary embolism without acute cor pulmonale, unspecified chronicity ([facility]) 2025     Acute on chronic systolic (congestive) heart failure ([facility]) 2025     Obesity, morbid ([facility]) 2025     Type 2 diabetes mellitus with stage 3 chronic kidney disease, without long-term current use of insulin, unspecified whether stage 3a or 3b CKD ([facility]) 2025     Numbness and tingling in left arm 2024     Primary osteoarthritis of right knee 10/08/2024     History of torn meniscus of right knee 10/08/2024     Chronic pain of right knee 10/08/2024     Localized edema 10/02/2024     POP (obstructive sleep apnea) 2024     Class 1 obesity due to excess calories with serious comorbidity and body mass index (BMI) of 34.0 to 34.9 in adult 2024     Ganglion cyst of finger of left hand 08/15/2024     History of pulmonary embolism 2024     History of gastric ulcer 2024     Acquired hypothyroidism 2024     Type 2 diabetes mellitus, without long-term current use of insulin ([facility]) 2024     S/P ICD (internal cardiac defibrillator) procedure 2024     Chronic low back pain 2024     HFrEF (heart failure with reduced ejection fraction) ([facility]) 2024     Ischemic cardiomyopathy 2024     Coronary artery disease involving native coronary artery of native heart 2024     S/P coronary artery stent placement 2024     Atrial flutter, paroxysmal  (HCC) 03/22/2024     Mixed hyperlipidemia 03/22/2024     History of tobacco abuse 03/22/2024     History of sustained ventricular tachycardia 03/22/2024     Back pain 07/31/2022     Sciatica of left side 07/31/2022       LOS (days): 5  Geometric Mean LOS (GMLOS) (days): 4  Days to GMLOS:-0.7     OBJECTIVE:  Risk of Unplanned Readmission Score: 24.97         Current admission status: Inpatient   Preferred Pharmacy:   [facility]/pharmacy #[id number] [place], [place] - [address] [address]  Phone: [phone] Fax: [phone]    [facility]/pharmacy #[id number] [place], [place] - [address]  [address] [facility] [address]  Phone: [phone] Fax: [phone]    Primary Care Provider: Melissa Montoya DO    Primary Insurance: BLUE CROSS  Secondary Insurance: WORKERS COMPENSATION    DISCHARGE DETAILS:     CM received return call from Daniela Nuñez from [employer] ([phone]) re: dcp for IPR.   Per Daniela pt has an open and active claim - no auth needed for post acute rehab. Claims to be submitted to [employer].   Fax received from [employer] w/claim # [id number] and billing information.  Per Daniela - [employer] comp would be primary payer.      This CM received message from bedside nurse - pt requesting to speak to this CM as she was transferred to [facility] overnight from [facility].      CM met w/pt at bedside to discuss dcp & reassure pt this CM will continue to follow pt for her dcp needs.  Per pt  - ortho provider plans to pull drain tomorrow.  CM discussed w/pt rehab at [facility]. Explained no bed available at [facility] until possibly the end of the week. Discussed bed available at [place] and [facility] - pt agreeable to [facility] location, [place] is too far.  CM to follow for dcp needs pending medical course.

## 2025-05-06 NOTE — ASSESSMENT & PLAN NOTE
Wt Readings from Last 3 Encounters:   05/06/25 100 kg (220 lb 10.9 oz)   05/06/25 98.5 kg (217 lb 2.5 oz)   04/24/25 94.3 kg (208 lb)     LVEF 20% 3/26/24  Home: jardiance 25, Entresto BID, PRN lAsix  Entresto and Jardiance held in acute  Restart home meds when able  Daily weights  Sodium restrictive diet  Management per IM

## 2025-05-06 NOTE — ASSESSMENT & PLAN NOTE
Recent Labs     05/04/25  0459 05/05/25  0434 05/06/25  0534   WBC 11.41* 8.39 7.92     Likely reactive in the setting of recent procedure  Remains afebrile; no s/sx of infxn

## 2025-05-06 NOTE — DISCHARGE SUMMARY
Discharge Summary - Orthopedics   Name: Vesna Langston 65 y.o. female I MRN: [id number]  Unit/Bed#: -01 I Date of Admission: 5/1/2025   Date of Service: 5/6/2025 I Hospital Day: 5    Admission Date: 5/1/2025 0841  Discharge Date: 05/06/25  Admitting Diagnosis: History of lumbar spinal fusion [Z98.1]  Radiculopathy, lumbar region [M54.16]  Discharge Diagnosis: History of lumbar spinal fusion [Z98.1]  Radiculopathy, lumbar region [M54.16]  Medical Problems       Resolved Problems  Date Reviewed: 4/24/2025          Resolved    Leukocytosis 5/6/2025     Resolved by  Rachael Bryant PA-C          HPI:  65 y.o. female with a history of pseudoarthrosis to the lumbar spine who has been seen by Dr. Mcrae in clinic.  Pt has failed previous non operative therapies and was scheduled for L3-S1 laminectomy/decompression, revision L3-S1 posterior instrumented spinal fusion with TLIF. Prior to surgery the risks and benefits of surgery were explained and informed consent was obtained.    Procedures Performed: No orders of the defined types were placed in this encounter.      Summary of Hospital Course:  Pt was taken to the OR on 5/1/2025.  Surgery went without complications and pt was discharged to the PACU in a stable condition and was transferred to the floor.  With patient's cardiac history and out of an abundance of caution patient was evaluated closely on telemetry, where no significant abnormalities were noted. On discharge date pt was cleared by PT and the medicine team and determined to be safe for discharge.  Daily discussion was had with the patient, nursing staff, orthopaedic team, and family members if present.  All questions were answered to the patients satisifaction.    Greater than 2 gram decrease in Hb qualifies for diagnosis of acute blood loss anemia. Vital signs remained stable and pt was resuscitated with IVF as needed.     Significant Findings, Care, Treatment and Services Provided: See  above    Complications: none    Condition at Discharge: good       Discharge instructions/Information to patient and family:   See After Visit Summary (AVS) for information provided to patient and family.      Provisions for Follow-Up Care:  See after visit summary for information related to follow-up care and any pertinent home health orders.      PCP: Melissa Montoya DO    Disposition: Short-term rehab at [facility]    Planned Readmission: No     Discharge Medications:  See after visit summary for reconciled discharge medications provided to patient and family.      Discharge Statement:  I have spent a total time of 25 minutes in caring for this patient on the day of the visit/encounter. .

## 2025-05-06 NOTE — PLAN OF CARE
Problem: GASTROINTESTINAL - ADULT  Goal: Minimal or absence of nausea and/or vomiting  Description: INTERVENTIONS:- Administer IV fluids if ordered to ensure adequate hydration- Maintain NPO status until nausea and vomiting are resolved- Nasogastric tube if ordered- Administer ordered antiemetic medications as needed- Provide nonpharmacologic comfort measures as appropriate- Advance diet as tolerated, if ordered- Consider nutrition services referral to assist patient with adequate nutrition and appropriate food choices  Outcome: Progressing     Problem: GENITOURINARY - ADULT  Goal: Maintains or returns to baseline urinary function  Description: INTERVENTIONS:- Assess urinary function- Encourage oral fluids to ensure adequate hydration if ordered- Administer IV fluids as ordered to ensure adequate hydration- Administer ordered medications as needed- Offer frequent toileting- Follow urinary retention protocol if ordered  Outcome: Progressing   Pt will remain continent of bowel and bladder,

## 2025-05-06 NOTE — ASSESSMENT & PLAN NOTE
Lab Results   Component Value Date    HGBA1C 5.9 (H) 03/28/2025     Recent Labs     05/05/25  1128 05/05/25  1658 05/05/25  2141 05/06/25  0809   POCGLU 167* 129 158* 112     Blood Sugar Average: Last 72 hrs:  (P) 135  A1c at goal   Home regimen: Jardiance, Ozempic - severe reflux symptoms - on hold, resume at d/c

## 2025-05-06 NOTE — PLAN OF CARE
Problem: PAIN - ADULT  Goal: Verbalizes/displays adequate comfort level or baseline comfort level  Description: Interventions:- Encourage patient to monitor pain and request assistance- Assess pain using appropriate pain scale- Administer analgesics based on type and severity of pain and evaluate response- Implement non-pharmacological measures as appropriate and evaluate response- Consider cultural and social influences on pain and pain management- Notify physician/advanced practitioner if interventions unsuccessful or patient reports new pain  Outcome: Progressing     Problem: SAFETY ADULT  Goal: Patient will remain free of falls  Description: INTERVENTIONS:- Educate patient/family on patient safety including physical limitations- Instruct patient to call for assistance with activity - Consult OT/PT to assist with strengthening/mobility - Keep Call bell within reach- Keep bed low and locked with side rails adjusted as appropriate- Keep care items and personal belongings within reach- Initiate and maintain comfort rounds- Make Fall Risk Sign visible to staff- Offer Toileting every 2 Hours, in advance of need- Initiate/Maintain bed/chair alarm- Obtain necessary fall risk management equipment: nonskid footwear- Apply yellow socks and bracelet for high fall risk patients- Consider moving patient to room near nurses station  Outcome: Progressing     Problem: DISCHARGE PLANNING  Goal: Discharge to home or other facility with appropriate resources  Description: INTERVENTIONS:- Identify barriers to discharge w/patient and caregiver- Arrange for needed discharge resources and transportation as appropriate- Identify discharge learning needs (meds, wound care, etc.)- Arrange for interpretive services to assist at discharge as needed- Refer to Case Management Department for coordinating discharge planning if the patient needs post-hospital services based on physician/advanced practitioner order or complex needs related to  functional status, cognitive ability, or social support system  Outcome: Progressing     Problem: Knowledge Deficit  Goal: Patient/family/caregiver demonstrates understanding of disease process, treatment plan, medications, and discharge instructions  Description: Complete learning assessment and assess knowledge base.Interventions:- Provide teaching at level of understanding- Provide teaching via preferred learning methods  Outcome: Progressing

## 2025-05-07 LAB
ANION GAP SERPL CALCULATED.3IONS-SCNC: 10 MMOL/L (ref 4–13)
BUN SERPL-MCNC: 11 MG/DL (ref 5–25)
CALCIUM SERPL-MCNC: 9 MG/DL (ref 8.4–10.2)
CHLORIDE SERPL-SCNC: 101 MMOL/L (ref 96–108)
CO2 SERPL-SCNC: 28 MMOL/L (ref 21–32)
CREAT SERPL-MCNC: 0.81 MG/DL (ref 0.6–1.3)
GFR SERPL CREATININE-BSD FRML MDRD: 75 ML/MIN/1.73SQ M
GLUCOSE P FAST SERPL-MCNC: 120 MG/DL (ref 65–99)
GLUCOSE SERPL-MCNC: 104 MG/DL (ref 65–140)
GLUCOSE SERPL-MCNC: 120 MG/DL (ref 65–140)
GLUCOSE SERPL-MCNC: 120 MG/DL (ref 65–140)
GLUCOSE SERPL-MCNC: 128 MG/DL (ref 65–140)
GLUCOSE SERPL-MCNC: 160 MG/DL (ref 65–140)
POTASSIUM SERPL-SCNC: 4.1 MMOL/L (ref 3.5–5.3)
SODIUM SERPL-SCNC: 139 MMOL/L (ref 135–147)

## 2025-05-07 PROCEDURE — 97535 SELF CARE MNGMENT TRAINING: CPT

## 2025-05-07 PROCEDURE — 97110 THERAPEUTIC EXERCISES: CPT

## 2025-05-07 PROCEDURE — 99254 IP/OBS CNSLTJ NEW/EST MOD 60: CPT | Performed by: NURSE PRACTITIONER

## 2025-05-07 PROCEDURE — 97530 THERAPEUTIC ACTIVITIES: CPT

## 2025-05-07 PROCEDURE — 97166 OT EVAL MOD COMPLEX 45 MIN: CPT

## 2025-05-07 PROCEDURE — 99233 SBSQ HOSP IP/OBS HIGH 50: CPT | Performed by: PHYSICAL MEDICINE & REHABILITATION

## 2025-05-07 PROCEDURE — 97116 GAIT TRAINING THERAPY: CPT

## 2025-05-07 PROCEDURE — 97162 PT EVAL MOD COMPLEX 30 MIN: CPT

## 2025-05-07 PROCEDURE — 80048 BASIC METABOLIC PNL TOTAL CA: CPT

## 2025-05-07 PROCEDURE — 82948 REAGENT STRIP/BLOOD GLUCOSE: CPT

## 2025-05-07 RX ORDER — POLYETHYLENE GLYCOL 3350 17 G/17G
17 POWDER, FOR SOLUTION ORAL DAILY PRN
Status: DISCONTINUED | OUTPATIENT
Start: 2025-05-07 | End: 2025-05-14 | Stop reason: HOSPADM

## 2025-05-07 RX ORDER — POLYETHYLENE GLYCOL 3350 17 G/17G
17 POWDER, FOR SOLUTION ORAL ONCE
Status: COMPLETED | OUTPATIENT
Start: 2025-05-07 | End: 2025-05-07

## 2025-05-07 RX ADMIN — DOCUSATE SODIUM 100 MG: 100 CAPSULE, LIQUID FILLED ORAL at 17:14

## 2025-05-07 RX ADMIN — ACETAMINOPHEN 650 MG: 325 TABLET, FILM COATED ORAL at 05:43

## 2025-05-07 RX ADMIN — PREGABALIN 75 MG: 75 CAPSULE ORAL at 21:47

## 2025-05-07 RX ADMIN — CLOPIDOGREL BISULFATE 75 MG: 75 TABLET, FILM COATED ORAL at 08:44

## 2025-05-07 RX ADMIN — DOCUSATE SODIUM 100 MG: 100 CAPSULE, LIQUID FILLED ORAL at 08:43

## 2025-05-07 RX ADMIN — ISOSORBIDE MONONITRATE 30 MG: 30 TABLET, EXTENDED RELEASE ORAL at 08:44

## 2025-05-07 RX ADMIN — POLYETHYLENE GLYCOL 3350 17 G: 17 POWDER, FOR SOLUTION ORAL at 17:10

## 2025-05-07 RX ADMIN — ATORVASTATIN CALCIUM 80 MG: 80 TABLET, FILM COATED ORAL at 17:11

## 2025-05-07 RX ADMIN — PANTOPRAZOLE SODIUM 40 MG: 40 TABLET, DELAYED RELEASE ORAL at 06:03

## 2025-05-07 RX ADMIN — DOFETILIDE 125 MCG: 0.12 CAPSULE ORAL at 08:44

## 2025-05-07 RX ADMIN — SACUBITRIL AND VALSARTAN 1 TABLET: 24; 26 TABLET, FILM COATED ORAL at 10:05

## 2025-05-07 RX ADMIN — PREGABALIN 75 MG: 75 CAPSULE ORAL at 17:14

## 2025-05-07 RX ADMIN — OXYCODONE HYDROCHLORIDE 5 MG: 5 TABLET ORAL at 05:47

## 2025-05-07 RX ADMIN — EMPAGLIFLOZIN 10 MG: 10 TABLET, FILM COATED ORAL at 10:05

## 2025-05-07 RX ADMIN — OXYCODONE HYDROCHLORIDE 5 MG: 5 TABLET ORAL at 10:02

## 2025-05-07 RX ADMIN — APIXABAN 5 MG: 5 TABLET, FILM COATED ORAL at 21:47

## 2025-05-07 RX ADMIN — SENNOSIDES 8.6 MG: 8.6 TABLET, FILM COATED ORAL at 08:44

## 2025-05-07 RX ADMIN — DOFETILIDE 125 MCG: 0.12 CAPSULE ORAL at 22:26

## 2025-05-07 RX ADMIN — ACETAMINOPHEN 650 MG: 325 TABLET, FILM COATED ORAL at 11:58

## 2025-05-07 RX ADMIN — PREGABALIN 75 MG: 75 CAPSULE ORAL at 08:44

## 2025-05-07 RX ADMIN — PANTOPRAZOLE SODIUM 40 MG: 40 TABLET, DELAYED RELEASE ORAL at 17:14

## 2025-05-07 RX ADMIN — FLUTICASONE PROPIONATE 1 SPRAY: 50 SPRAY, METERED NASAL at 08:43

## 2025-05-07 RX ADMIN — SACUBITRIL AND VALSARTAN 1 TABLET: 24; 26 TABLET, FILM COATED ORAL at 17:19

## 2025-05-07 RX ADMIN — APIXABAN 5 MG: 5 TABLET, FILM COATED ORAL at 08:44

## 2025-05-07 RX ADMIN — INSULIN LISPRO 1 UNITS: 100 INJECTION, SOLUTION INTRAVENOUS; SUBCUTANEOUS at 11:59

## 2025-05-07 RX ADMIN — ACETAMINOPHEN 650 MG: 325 TABLET, FILM COATED ORAL at 17:10

## 2025-05-07 RX ADMIN — OXYCODONE HYDROCHLORIDE 5 MG: 5 TABLET ORAL at 17:14

## 2025-05-07 NOTE — PROGRESS NOTES
Progress Note - PMR   Name: Vesna Langston 65 y.o. female I MRN: [id number]  Unit/Bed#: -01 I Date of Admission: 5/6/2025   Date of Service: 5/7/2025 I Hospital Day: 1     Assessment & Plan  Status post lumbar spinal fusion  S/p B/L navigated revision L3-S1 laminectomy/decompression, revision L3-S1 posterior instrumented spinal fusion with TLIF on 05/1/25  WBAT LLE  LSO brace OOB  F/u Dr Mcrae 2 weeks (5/15)  Keep dressing intact and incision dry until 2 week post op visit  DVT ppx: eliquis and plavix  Monitor incision  PT/OT evaluate and treat  Acute pain  Tylenol 650 Q6  Oxycodone 10/5  Pregabalin   Titrate pain meds as needed  Constipation  Scheduled colace and senna  Miralax and Lactulose PRN  Titrate meds as needed  HFrEF (heart failure with reduced ejection fraction) ([facility])  Wt Readings from Last 3 Encounters:   05/07/25 97.3 kg (214 lb 6.4 oz)   05/06/25 98.5 kg (217 lb 2.5 oz)   04/24/25 94.3 kg (208 lb)     LVEF 20% 3/26/24  Home: jardiance 25, Entresto BID, PRN lAsix  Entresto and Jardiance restarted 5/7  Restart home meds when able  Daily weights  Sodium restrictive diet  Management per IM  CAD (coronary artery disease)  Continuing toprol, Imdur, and lipitor  S/p MI 4/2024, received PCI  T2DM (type 2 diabetes mellitus) ([facility])  Lab Results   Component Value Date    HGBA1C 5.9 (H) 03/28/2025       Recent Labs     05/06/25  1211 05/06/25  1656 05/06/25  2040 05/07/25  0557   POCGLU 144* 158* 171* 120       Blood Sugar Average: Last 72 hrs:  (P) [id number]  Home: jardiance, Ozempic (held in acute due to reflux)  Jardiance restarted 5/7  Management per IM  GERD (gastroesophageal reflux disease)  Continuing protonix  Hypothyroid  Continuing levothyroxine  Atrial flutter, paroxysmal (HCC)  Continuing Torpol, Tikosyn BID, and eliquis  Anemia  Baseline hgb 11-12  5/6 hgb 9.0  Monitor h/h  S/P ICD (internal cardiac defibrillator) procedure  Implanted by Dr Lama 3/27/24 due to scar related VT and  ischemic cardiomyopathy    DVT ppx: Eliquis and plavix    History of Present Illness   Vesna Langston is a 65 y.o. female who presented to the [facility] on 5/1 for elective orthopedic surgery due to lumbar radiculopathy. Pain begain 2 years ago after an injury at work. S/p exploration of fusion mass L3-S1, removal of hardware L3-S1, TLIF TLIF, laminotomy/facetectomy, L5-S1; Arthrodesis, T4-5; Arthrodesis, T3-4; Posterior segmental instrumentation/pedicle screw fixation,L3-S1. Admitted to [facility] 5/6 .     Chief Complaint: f/u ambulatory dysfunction    Interval: Patient seen and examined during therapy this morning. No events overnight.  Reports that she was very hot in her room last night and had to use a fan. Pain is a 6/10 during therapy after 5mg oxycodone.  Last BM 5/4. Added miralax for one time dose tonight. Sleeping well.     Review of Systems   Constitutional:  Negative for chills and fever.   Respiratory:  Negative for shortness of breath.    Cardiovascular:  Negative for chest pain and leg swelling.   Gastrointestinal:  Positive for constipation. Negative for abdominal pain, diarrhea, nausea and vomiting.   Musculoskeletal:  Positive for back pain.   Neurological:  Positive for numbness (LLE below knee chronic).       Objective   Functional Update:  Physical Therapy Occupational Therapy Speech Therapy                           Temp:  [97.7 °F (36.5 °C)-98.6 °F (37 °C)] 97.7 °F (36.5 °C)  HR:  [81-99] 87  Resp:  [18] 18  BP: (102-137)/(57-83) 137/83  SpO2:  [95 %-96 %] 96 %    Physical Exam  Constitutional:       General: She is not in acute distress.  HENT:      Head: Normocephalic and atraumatic.      Mouth/Throat:      Mouth: Mucous membranes are moist.   Cardiovascular:      Rate and Rhythm: Normal rate and regular rhythm.   Pulmonary:      Effort: Pulmonary effort is normal.      Breath sounds: Normal breath sounds.   Abdominal:      General: Bowel sounds are normal.    Musculoskeletal:      Comments: On spinal precautions  Wearing brace   Skin:     General: Skin is warm and dry.   Neurological:      Mental Status: She is alert. Mental status is at baseline.      Sensory: Sensory deficit (LLE below knee diminished sensation) present.      Motor: Weakness (LLE) present.   Psychiatric:         Mood and Affect: Mood normal.         Behavior: Behavior normal.           Scheduled Meds:  Current Facility-Administered Medications   Medication Dose Route Frequency Provider Last Rate    acetaminophen  650 mg Oral Q6H MONA Manisha Saldivar PA-C      aluminum-magnesium hydroxide-simethicone  30 mL Oral Q6H PRN Manisha Saldivar PA-C      apixaban  5 mg Oral Q12H Manisha Saldivar PA-C      atorvastatin  80 mg Oral QPM Manisha Saldivar PA-C      calcium carbonate  1,000 mg Oral Daily PRN Manisha Saldivar PA-C      clopidogrel  75 mg Oral Daily Manisha Saldivar PA-C      docusate sodium  100 mg Oral BID Manisha Saldivar PA-C      dofetilide  125 mcg Oral Q12H MONA Manisha Saldivar PA-C      Empagliflozin  10 mg Oral Daily MAHI Pierce      fluticasone  1 spray Nasal Daily Manisha Saldivar PA-C      insulin lispro  1-5 Units Subcutaneous HS Manisha Saldivar PA-C      insulin lispro  1-6 Units Subcutaneous TID AC Manisha Saldivar PA-C      isosorbide mononitrate  30 mg Oral BID Manisha Saldivar PA-C      lactulose  30 g Oral Daily PRN Manisha Saldivar PA-C      [START ON 5/8/2025] levothyroxine  75 mcg Oral Daily Manisha Saldivar PA-C      metoprolol succinate  50 mg Oral HS Manisha Saldivar PA-C      ondansetron  4 mg Oral Q6H PRN Manisha Saldivar PA-C      oxyCODONE  10 mg Oral Q4H PRN Manisha Sladivar PA-C      oxyCODONE  5 mg Oral Q4H PRN Manisha Saldivar PA-C      pantoprazole  40 mg Oral BID AC Manisha Saldivar PA-C      pregabalin  75 mg Oral TID Mainsha Saldivar PA-C      sacubitril-valsartan  1  tablet Oral BID MAHI Pierce  1 tablet Oral Daily Manisha Saldivar PA-C           Lab Results: I have reviewed the following results:  Results from last 7 days   Lab Units 05/06/25  0534 05/05/25  0434 05/04/25  0459   HEMOGLOBIN g/dL 9.0* 9.4* 9.5*   HEMATOCRIT % 28.5* 30.5* 31.0*   WBC Thousand/uL 7.92 8.39 11.41*   PLATELETS Thousands/uL 224 211 195     Results from last 7 days   Lab Units 05/07/25  0602 05/06/25  0534 05/05/25  0434   BUN mg/dL 11 9 10   SODIUM mmol/L 139 138 139   POTASSIUM mmol/L 4.1 3.9 3.9   CHLORIDE mmol/L 101 101 103   CREATININE mg/dL 0.81 0.68 0.56*              Manisha Saldivar PA-C  Physical Medicine and Rehabilitation   05/07/25

## 2025-05-07 NOTE — ASSESSMENT & PLAN NOTE
Wt Readings from Last 3 Encounters:   05/07/25 97.3 kg (214 lb 6.4 oz)   05/06/25 98.5 kg (217 lb 2.5 oz)   04/24/25 94.3 kg (208 lb)     Last ECHO on 10/8/24: EF 40-45%, systolic function moderately reduced, and diastolic function abnormal.  Continue home Toprol XL 50mg at HS.  Restart home Jardiance and Entresto today.  Had been on Lasix 40mg PRN at home.  Monitor I&Os and daily weights.  Follows with Cardiology (Dr. Bell) as outpatient.

## 2025-05-07 NOTE — PROGRESS NOTES
05/07/25 0700   Rehab Team Goals   ADL Team Goal Patient will be independent with ADLs with least restrictive device upon completion of rehab program   Rehab Team Interventions   OT Interventions Self Care;Home Management;Patient/Family Education   Eating Goal   Eating Goal 06. Independent - Patient completes the activity by him/herself with no assistance from a helper.   Status Ongoing;Target goal - two weeks   Interventions Optimal Position   Grooming Goal   Oral Hygiene Goal 06. Independent - Patient completes the activity by him/herself with no assistance from a helper.   Task Complete Groom   Environment Stand at Sink   Safety Precautions Other  (spinal precautions)   Status Ongoing;Target goal - two weeks   Intervention Tolerance Work;Balance Work   Tub/Shower Transfer Goal   Method Shower Stall  (mod I)   Assist Device Seat with Back;Grab Bar;Hand Held Shower   Status Ongoing;Target goal - two weeks   Interventions ADL Training;Assistive Device   Bathing Goal   Shower/bathe self Goal 06. Independent - Patient completes the activity by him/herself with no assistance from a helper.   Environment Seated;Standing;Shower   Adaptive Equipment Long Handle Sponge;Seat with back;Grab Bar;Hand Held Shower   Safety Precautions Other  (spinal precautions)   Status Ongoing;Target goal - two weeks   Intervention ADL Training;Assistive Device;Therapeutic Exercise   Upper Body Dressing Goal   Upper body dressing Goal 06. Independent - Patient completes the activity by him/herself with no assistance from a helper.   Task Upper Body   Environment Seated   Safety Precautions Other  (spinal precautions)   Status Ongoing;Target goal - two weeks   Intervention Tolerance Work;Balance Work   Lower Body Dressing Goal   Lower body dressing Goal 06. Independent - Patient completes the activity by him/herself with no assistance from a helper.   Putting on/taking off footwear Goal 06. Independent - Patient completes the activity by  him/herself with no assistance from a helper.   Task Lower Body   Environment Seated;Standing   Safety Precautions Other  (spinal precautions)   Status Ongoing;Target goal - two weeks   Intervention Assistive Device;Balance Work;Therapeutic Exercise;Tolerance Work   Toileting Transfer Goal   Toilet transfer Goal 06. Independent - Patient completes the activity by him/herself with no assistance from a helper.   Safety Other  (spinal precautions)   Status Ongoing;Target goal - two weeks   Intervention Balance Work;ADL Training   Toileting Goal   Toileting hygiene Goal 06. Independent - Patient completes the activity by him/herself with no assistance from a helper.   Task Pants Up;Pants Down;Hygiene   Assistive Device Long Handled Sponge   Safety Other  (spinal precautions)   Status Ongoing;Target goal - two weeks  (spinal precautions)   Intervention ADL Training;Balance Work;Assistive Device   Community Reintegration Goal   Goal Pt will dmeonstrate independence with community mobility using LRAD to allow for light grocery store trips within lifting restriction.   Status Ongoing;Target - two weeks   Interventions Activity Tolerance   Meal Prep and Kitchen Mobility   Assist Level Independent   Status Ongoing;Target goal - two weeks   Laundry   Assist Level Independent   Status Ongoing;Target goal - two weeks

## 2025-05-07 NOTE — PCC NURSING
Vesna Langston is a 65 y.o. female who presented to the [facility] on 5/1 for elective orthopedic surgery due to lumbar radiculopathy. Pain begain 2 years ago after an injury at work. S/p exploration of fusion mass L3-S1, removal of hardware L3-S1, TLIF TLIF, laminotomy/facetectomy, L5-S1; Arthrodesis, T4-5; Arthrodesis, T3-4; Posterior segmental instrumentation/pedicle screw fixation,L3-S1. Admitted to [facility] 5/6 .      Pain managed with tylenol 650 q6hrs, prn oxy 5mg/10mg q4 hrs. A-flutter managed pacemaker, Toprol XL 50mg at HS and Tikosyn 125mcg BID for rate control. Eliquis 5mg BID for anticoagulation. HFrEF managed with Jardiance 10mg daily and Entresto 24-26 mg BID. CAD managed with Imdur 30mg BID, Toprol XL 50mg at HS, Lipitor 80mg daily, and Plavix 75mg daily. T2DM Jardiance 10mg daily, cons. carb diet, QID accuchecks, and SSI. GERD managed with Protonix 40mg daily. Hypothyroid managed with levothyroxine 75mcg daily. Bowel regimen managed with senna 17.2mg daily, mirlax daily and prn, prn lactulose. Flonase daily for congestion. Anemia managed with fergon 324 mg eod.         This week we will continue to monitor vital signs and lab values. We will manage pain with the above orders so that the patient can participate in her therapy sessions to her optimal abilities. We will teach the patient how to conserve energy so that she may perform ADL's independently as much as she can. We will educate the patient on the importance of repositioning and off-loading pressure to help lower the risk of skin breakdown. We will prevent falls by keeping personal items and call bell within reach, we will also initiate and maintain hourly rounding.

## 2025-05-07 NOTE — ASSESSMENT & PLAN NOTE
Wt Readings from Last 3 Encounters:   05/07/25 97.3 kg (214 lb 6.4 oz)   05/06/25 98.5 kg (217 lb 2.5 oz)   04/24/25 94.3 kg (208 lb)     LVEF 20% 3/26/24  Home: jardiance 25, Entresto BID, PRN lAsix  Entresto and Jardiance restarted 5/7  Restart home meds when able  Daily weights  Sodium restrictive diet  Management per IM

## 2025-05-07 NOTE — PLAN OF CARE
Problem: NEUROSENSORY - ADULT  Goal: Achieves stable or improved neurological status  Description: INTERVENTIONS  - Monitor and report changes in neurological status  - Monitor vital signs such as temperature, blood pressure, glucose, and any other labs ordered     Outcome: Progressing     Problem: CARDIOVASCULAR - ADULT  Goal: Maintains optimal cardiac output and hemodynamic stability  Description: INTERVENTIONS:  - Monitor I/O, vital signs and rhythm  - Monitor for S/S and trends of decreased cardiac output  -  Assess quality of pulses, skin color and temperature  - Assess for signs of decreased coronary artery perfusion- Instruct patient to report change in severity of symptoms  Outcome: Progressing     Problem: RESPIRATORY - ADULT  Goal: Achieves optimal ventilation and oxygenation  Description: INTERVENTIONS:  - Assess for changes in respiratory status  - Assess for changes in mentation and behavior  - Position to facilitate oxygenation and minimize respiratory effort  - Oxygen administered by appropriate delivery if ordered  - Encourage broncho-pulmonary hygiene including cough, deep breathe, Incentive Spirometry  - Assess the need for suctioning and aspirate as needed  - Assess and instruct to report SOB or any respiratory difficulty  Outcome: Progressing     Problem: GASTROINTESTINAL - ADULT  Goal: Maintains or returns to baseline bowel function  Description: INTERVENTIONS:  - Assess bowel function  - Encourage oral fluids to ensure adequate hydration  - Administer ordered medications as needed  - Encourage mobilization and activity  - Consider nutritional services referral to assist patient with adequate nutrition and appropriate food choices  Outcome: Progressing  Goal: Maintains adequate nutritional intake  Description: INTERVENTIONS:  - Monitor percentage of each meal consumed  - Identify factors contributing to decreased intake, treat as appropriate  - Assist with meals as needed  - Monitor I&O,  weight, and lab values if indicated  - Obtain nutrition services referral as needed  Outcome: Progressing     Problem: GENITOURINARY - ADULT  Goal: Maintains or returns to baseline urinary function  Description: INTERVENTIONS:  - Assess urinary function  - Encourage oral fluids to ensure adequate hydration if ordered  - Administer ordered medications as needed  - Offer frequent toileting  - Follow urinary retention protocol if ordered  Outcome: Progressing  Goal: Absence of urinary retention  Description: INTERVENTIONS:  - Assess patient's ability to void and empty bladder   Outcome: Progressing     Problem: METABOLIC, FLUID AND ELECTROLYTES - ADULT  Goal: Electrolytes maintained within normal limits  Description: INTERVENTIONS:  - Monitor labs and assess patient for signs and symptoms of electrolyte imbalances  - Administer electrolyte replacement as ordered  - Monitor response to electrolyte replacements, including repeat lab results as appropriate  - Instruct patient on fluid and nutrition as appropriate  Outcome: Progressing  Goal: Fluid balance maintained  Description: INTERVENTIONS:  - Monitor labs   - Monitor I/O and WT  - Instruct patient on fluid and nutrition as appropriate  - Assess for signs & symptoms of volume excess or deficit  Outcome: Progressing  Goal: Glucose maintained within target range  Description: INTERVENTIONS:  - Monitor Blood Glucose as ordered  - Assess for signs and symptoms of hyperglycemia and hypoglycemia  - Administer ordered medications to maintain glucose within target range  - Assess nutritional intake and initiate nutrition service referral as needed  Outcome: Progressing     Problem: SKIN/TISSUE INTEGRITY - ADULT  Goal: Skin Integrity remains intact(Skin Breakdown Prevention)  Description: Assess:  -Perform Lanre assessment every shift  -Clean and moisturize skin every shift  -Inspect skin when repositioning, toileting, and assisting with ADLS  -Assess under medical devices  such as Allevyns every shift  -Assess extremities for adequate circulation and sensation     Bed Management:  -Have minimal linens on bed & keep smooth, unwrinkled  -Change linens as needed when moist or perspiring  -Avoid sitting or lying in one position for more than two hours while in bed  -Keep HOB at degrees     Toileting:  -Assess for incontinence every two hours  -Use incontinent care products after each incontinent episode such as foam cleanser.     Activity:  -Mobilize patient two times a day  -Encourage activity and walks on unit  -Encourage or provide ROM exercises  -Turn and reposition patient every two Hours  -Use appropriate equipment to lift or move patient in bed  -Instruct/ Assist with weight shifting every two hours when out of bed in chair  -Consider limitation of chair time two hour intervals    Skin Care:  -Avoid use of baby powder, tape, friction and shearing, hot water or constrictive clothing  -Relieve pressure over bony prominences using repositioning   -Do not massage red bony areas    Next Steps:  -Teach patient strategies to minimize risks such as repositioning   -Consider consults to  interdisciplinary teams such as therapy  Outcome: Progressing  Goal: Incision(s), wounds(s) or drain site(s) healing without S/S of infection  Description: INTERVENTIONS  - Assess and document dressing, incision, wound bed, drain sites and surrounding tissue  - Provide patient and family education- Perform skin care/dressing changes every shift.  Outcome: Progressing     Problem: HEMATOLOGIC - ADULT  Goal: Maintains hematologic stability  Description: INTERVENTIONS  - Assess for signs and symptoms of bleeding or hemorrhage  - Monitor labs- Administer supportive blood products/factors as ordered and appropriate  Outcome: Progressing     Problem: MUSCULOSKELETAL - ADULT  Goal: Maintain or return mobility to safest level of function  Description: INTERVENTIONS:  - Assess patient's ability to carry out ADLs;  assess patient's baseline for ADL function and identify physical deficits which impact ability to perform ADLs (bathing, care of mouth/teeth, toileting, grooming, dressing, etc.)  - Assess/evaluate cause of self-care deficits   - Assess patient's mobility  - Assess patient's need for assistive devices and provide as appropriate- Encourage maximum independence but intervene and supervise when necessary- Involve family in performance of ADLs  - Assess for home care needs following discharge   - Provide patient education as appropriate  Outcome: Progressing  Goal: Maintain proper alignment of affected body part  Description: INTERVENTIONS:  - Support, maintain and protect limb and body alignment  - Provide patient/ family with appropriate education  Outcome: Progressing

## 2025-05-07 NOTE — ASSESSMENT & PLAN NOTE
Implanted in 03/2024 due to scar related VT and ischemic cardiomyopathy.  Follow-up with EP as needed as outpatient.

## 2025-05-07 NOTE — ASSESSMENT & PLAN NOTE
S/p stent in 03/2024.  Continue home Imdur 30mg BID, Toprol XL 50mg at HS, Lipitor 80mg daily, and Plavix 75mg daily.  Follows with Dr. Bell (Cardiology) as outpatient.

## 2025-05-07 NOTE — PCC CARE MANAGEMENT
Patient admitted to [facility] on 5/6 after inpatient hospital stay , s/p spinal surgery;rocedure(s):Exploration of fusion mass, L3-S1,Removal of hardware, L3-S1, Arthrodesis, L5-S1 TLIF, Laminotomy/facetectomy, L5-S1, Arthrodesis, L4-5 PL, Arthrodesis, L3-4 PL, Posterior segmental instrumentation/pedicle screw fixation,L3-S1, 3D computer-assisted navigation CM assessment to be done.

## 2025-05-07 NOTE — UTILIZATION REVIEW
NOTIFICATION OF ADMISSION DISCHARGE   This is a Notification of Discharge from [facility]. Please be advised that this patient has been discharge from our facility. Below you will find the admission and discharge date and time including the patient’s disposition.   UTILIZATION REVIEW CONTACT:  Utilization Review Assistants  Network Utilization Review Department  Phone: [phone] x carefully listen to the prompts. All voicemails are confidential.  Email: [email or web address]     ADMISSION INFORMATION  PRESENTATION DATE: 5/1/2025  8:41 AM  OBERVATION ADMISSION DATE: N/A  INPATIENT ADMISSION DATE: 5/1/25  4:15 PM   DISCHARGE DATE: 5/6/2025  3:45 PM   DISPOSITION:[facility]    Network Utilization Review Department  ATTENTION: Please call with any questions or concerns to [phone] and carefully listen to the prompts so that you are directed to the right person. All voicemails are confidential.   For Discharge needs, contact Care Management DC Support Team at [phone] opt. 2  Send all requests for admission clinical reviews, approved or denied determinations and any other requests to dedicated fax number below belonging to the campus where the patient is receiving treatment. List of dedicated fax numbers for the Facilities:  FACILITY NAME UR FAX NUMBER   ADMISSION DENIALS (Administrative/Medical Necessity) [phone]   DISCHARGE SUPPORT TEAM ([facility]) [phone]   PARENT CHILD HEALTH (Maternity/NICU/Pediatrics) [phone]   [facility] [phone]   [facility] [phone]   [facility] [phone]   [facility] [phone]   [facility] [phone]   [facility] [phone]   [facility] [phone]   [facility] [phone]   [facility]  [facility] [phone]   [facility] [phone]   [facility] [phone]   [facility] [phone]

## 2025-05-07 NOTE — ASSESSMENT & PLAN NOTE
Lab Results   Component Value Date    HGBA1C 5.9 (H) 03/28/2025       Recent Labs     05/06/25  1211 05/06/25  1656 05/06/25  2040 05/07/25  0557   POCGLU 144* 158* 171* 120       Blood Sugar Average: Last 72 hrs:  (P) 149.3565812747012918    Last A1c 5.9 on 3/28/24.  Home regimen: Jardiance 25mg daily and Ozempic 1mg/weekly.  Restart Jardiance today.  Resume Ozempic on discharge.  Continue cons. carb diet, QID accuchecks, and SSI.

## 2025-05-07 NOTE — ASSESSMENT & PLAN NOTE
Lab Results   Component Value Date    HGBA1C 5.9 (H) 03/28/2025       Recent Labs     05/06/25  1211 05/06/25  1656 05/06/25 2040 05/07/25  0557   POCGLU 144* 158* 171* 120       Blood Sugar Average: Last 72 hrs:  (P) 149.4656049664316550  Home: jardiance, Ozempic (held in acute due to reflux)  Jardiance restarted 5/7  Management per IM

## 2025-05-07 NOTE — PLAN OF CARE
Problem: GENITOURINARY - ADULT  Goal: Maintains or returns to baseline urinary function  Description: INTERVENTIONS:- Assess urinary function- Encourage oral fluids to ensure adequate hydration if ordered- Administer IV fluids as ordered to ensure adequate hydration- Administer ordered medications as needed- Offer frequent toileting- Follow urinary retention protocol if ordered  Outcome: Progressing     Problem: METABOLIC, FLUID AND ELECTROLYTES - ADULT  Goal: Glucose maintained within target range  Description: INTERVENTIONS:- Monitor Blood Glucose as ordered- Assess for signs and symptoms of hyperglycemia and hypoglycemia- Administer ordered medications to maintain glucose within target range- Assess nutritional intake and initiate nutrition service referral as needed  Outcome: Progressing     Problem: MUSCULOSKELETAL - ADULT  Goal: Maintain or return mobility to safest level of function  Description: INTERVENTIONS:- Assess patient's ability to carry out ADLs; assess patient's baseline for ADL function and identify physical deficits which impact ability to perform ADLs (bathing, care of mouth/teeth, toileting, grooming, dressing, etc.)- Assess/evaluate cause of self-care deficits - Assess range of motion- Assess patient's mobility- Assess patient's need for assistive devices and provide as appropriate- Encourage maximum independence but intervene and supervise when necessary- Involve family in performance of ADLs- Assess for home care needs following discharge - Consider OT consult to assist with ADL evaluation and planning for discharge- Provide patient education as appropriate  Outcome: Progressing

## 2025-05-07 NOTE — CONSULTS
Consultation - Internal Medicine   Name: Vesna Langston 65 y.o. female I MRN: [id number]  Unit/Bed#: -01 I Date of Admission: 5/6/2025   Date of Service: 5/7/2025 I Hospital Day: 1   Inpatient consult to Internal Medicine  Consult performed by: MAHI Pierce  Consult ordered by: Manisha Saldivar PA-C        Physician Requesting Evaluation: Hebert Aldrich MD     Assessment & Plan  Status post lumbar spinal fusion  Hx of L3-S1 fusion in 2023 due to work related injury.  Had been experiencing worsening LLE radiculopathy/lower back pain/ambulatory dysfunction.  MRI lumbar spine on 1/28/25 showed multilevel lumbar spondylosis without canal stenosis but there is mild L subarticular zone narrowing and mild bilateral foraminal stenosis at L3-L4 due to L sided disc protrusion at this level.    OR on 5/1 for elective bilateral navigated revision of L3-S1 laminectomy/decompression and revision of L3-S1 posterior instrumented spinal fusion and TLIF with Dr. Mcrae (Ortho/Spine).    LSO brace when OOB.  Neurovascular checks Q shift.  Ensure adequate pain control.  Monitor incision for s/s of infection.    Follow-up with Ortho/Spine as outpatient.    Primary team following.  PT/OT.  Atrial flutter, paroxysmal (HCC)  S/p pacemaker.  Continue home Toprol XL 50mg at HS and Tikosyn 125mcg BID for rate control.  Continue Eliquis 5mg BID for anticoagulation.  Monitor routine VS.  Replete electrolytes as needed.  Follows with Dr. Bell (Cardiology) as outpatient.  HFrEF (heart failure with reduced ejection fraction) (HCC)  Wt Readings from Last 3 Encounters:   05/07/25 97.3 kg (214 lb 6.4 oz)   05/06/25 98.5 kg (217 lb 2.5 oz)   04/24/25 94.3 kg (208 lb)     Last ECHO on 10/8/24: EF 40-45%, systolic function moderately reduced, and diastolic function abnormal.  Continue home Toprol XL 50mg at HS.  Restart home Jardiance and Entresto today.  Had been on Lasix 40mg PRN at home.  Monitor I&Os and daily  weights.  Follows with Cardiology (Dr. Bell) as outpatient.  S/P ICD (internal cardiac defibrillator) procedure  Implanted in 03/2024 due to scar related VT and ischemic cardiomyopathy.  Follow-up with EP as needed as outpatient.  CAD (coronary artery disease)  S/p stent in 03/2024.  Continue home Imdur 30mg BID, Toprol XL 50mg at HS, Lipitor 80mg daily, and Plavix 75mg daily.  Follows with Dr. Bell (Cardiology) as outpatient.  POP (obstructive sleep apnea)  Continue home CPAP at HS.  Follows with Sleep Medicine as outpatient.  Other pulmonary embolism without acute cor pulmonale, unspecified chronicity (HCC)  Hx of PE s/p appendectomy in 05/2024.  Continue home Eliquis 5mg BID.  T2DM (type 2 diabetes mellitus) ([facility])  Lab Results   Component Value Date    HGBA1C 5.9 (H) 03/28/2025       Recent Labs     05/06/25  1211 05/06/25  1656 05/06/25  2040 05/07/25  0557   POCGLU 144* 158* 171* 120       Blood Sugar Average: Last 72 hrs:  (P) [id number]    Last A1c 5.9 on 3/28/24.  Home regimen: Jardiance 25mg daily and Ozempic 1mg/weekly.  Restart Jardiance today.  Resume Ozempic on discharge.  Continue cons. carb diet, QID accuchecks, and SSI.  Anemia  Hgb currently 9.0.  Hgb 13.5 pre-operatively.  Likely acute blood loss anemia.  Obtain iron panel with next set of labs.  Continue to trend routine CBC.  GERD (gastroesophageal reflux disease)  Stable.  Continue home Protonix 40mg daily.  Follows with Kassi High PA-C (GI) as outpatient.  Hypothyroid  Continue home levothyroxine 75mcg daily.  Last TSH 3.749 on 4/10.    History of Present Illness:    Vesna Langston is a 65 y.o. female with a PMH of L3-S1 fusion in 2023, CAD s/p stent in 2024, atrial flutter, CHF, hypothyroidism, hx of PE, POP, and peptic ulcer disease who originally presented to [facility] on 5/1/2025 due ongoing LLE radiculopathy, lower back pain, and ambulatory dysfunction.  Patient was taken to the OR on 5/1/25 due to elective B/L navigated  revision L3-S1, laminectomy/decompression, revision L3-S1 posterior instrumental spinal fusion and TLIF with Dr. Mcrae.  Post-operatively, patient experienced acute blood loss anemia but did not require and blood products.  Experienced volume overload post-operatively as well and required increase in Lasix dosing.  Patient was evaluated by PT/OT and was recommended for inpatient acute rehabilitation.     Admitted to [facility] on 5/6/2025; we are consulted for medical clearance.        Pt examined while pt sitting in recliner in pt room.  Currently has complaints of mid back pain, 5-6/10, throbbing, received pain medication a few hours ago with improvement initially.  Has chronic numbness to the LLE from her original injury.  Denies any numbness to the RLE.  Denies any fevers, chills, lightheadedness, dizziness, SOB, palpitations, or CP.  Denies any abdominal pain.  LBM was on Saturday, states that she has been passing gas.  Denies any nausea or vomiting.  Denies any urinary complaints.  Feels that she may have been over-diuresed yesterday prior to coming here.  Lives at home by herself.  Has a daughter that mentioned the possibility of her temporarily moving in if she needs help.        Review of Systems:    Review of Systems   Constitutional:  Negative for appetite change, chills, fatigue and fever.   HENT:  Negative for trouble swallowing.    Eyes:  Negative for visual disturbance.   Respiratory:  Negative for cough, shortness of breath, wheezing and stridor.    Cardiovascular:  Negative for chest pain, palpitations and leg swelling.   Gastrointestinal:  Positive for constipation. Negative for abdominal distention, abdominal pain, diarrhea, nausea and vomiting.        LBM 5/4   Genitourinary:  Negative for difficulty urinating, dysuria, frequency and urgency.   Musculoskeletal:  Positive for back pain (mid back pain, 5-6/10, throbbing, worse after therapies, improves with pain medication) and  gait problem. Negative for arthralgias.   Neurological:  Positive for numbness (chronic numbness to LLE). Negative for dizziness, weakness, light-headedness and headaches.   Psychiatric/Behavioral:  Negative for dysphoric mood and sleep disturbance. The patient is not nervous/anxious.    All other systems reviewed and are negative.      Past Medical and Surgical History:     Past Medical History:   Diagnosis Date    Acute respiratory insufficiency 03/22/2024    Allergic     Anxiety 4/24    Arrhythmia 3/22/24    Atrial fibrillation (HCC) 3/22/24    Back pain 2022    Bradycardia 3/22/24    Breast cyst 2000    Chronic HFrEF (heart failure with reduced ejection fraction) ([facility])     Clotting disorder ([facility]) 4/1/24    Coronary artery disease     COVID-19 virus infection 11/28/2022    Depression 4/24    Diabetes mellitus ([facility])     Disease of thyroid gland 4/09/2024    Environmental and seasonal allergies 1/1/1960    GERD (gastroesophageal reflux disease) 10/09    GI (gastrointestinal bleed) 4/1/24    Heart disease 3/24    History of placement of internal cardiac defibrillator 3/27/24    Hyperlipidemia     Hypoglycemia     ICD (implantable cardioverter-defibrillator) in place     Ischemic cardiomyopathy     Lactic acidosis 03/22/2024    Migraine 1980    Morning headache 1980    Myocardial infarction ([facility]) 3/22/2024    Nausea & vomiting 04/03/2025    Otitis media 1966    Pacemaker 3/27/24    Peptic ulceration 4/2024    Seasonal allergies     Sinus problem 1970    ST elevation myocardial infarction involving left anterior descending (LAD) coronary artery ([facility]) 03/22/2024    Tobacco abuse     Visual impairment 1967       Past Surgical History:   Procedure Laterality Date    ADENOIDECTOMY      APPENDECTOMY      APPENDECTOMY LAPAROSCOPIC N/A 05/08/2024    Procedure: APPENDECTOMY LAPAROSCOPIC;  Surgeon: Lauryn Ullrich, DO;  Location: [facility];  Service: General    BACK SURGERY  8/23    BREAST EXCISIONAL BIOPSY Right 09/2000     cyst removed- benign    CARDIAC CATHETERIZATION N/A 2024    Procedure: Cardiac pci;  Surgeon: Gabriel Myers MD;  Location: [facility];  Service: Cardiology    CARDIAC CATHETERIZATION N/A 2024    Procedure: Cardiac Coronary Angiogram;  Surgeon: Gabriel Myers MD;  [facility];  Service: Cardiology    CARDIAC CATHETERIZATION N/A 2024    Procedure: Cardiac PCI AMI;  Surgeon: Gabriel Myers MD;  [facility];  Service: Cardiology    CARDIAC CATHETERIZATION N/A 2024    Procedure: Cardiac IVUS;  Surgeon: Gabriel Myers MD;  [facility];  Service: Cardiology    CARDIAC DEFIBRILLATOR PLACEMENT      CARDIAC ELECTROPHYSIOLOGY PROCEDURE N/A 2024    Procedure: Cardiac icd implant;  Surgeon: Jeffy Lama MD;  [facility];  Service: Cardiology     SECTION      COLONOSCOPY      ECTOPIC PREGNANCY SURGERY      INSERT / REPLACE / REMOVE PACEMAKER      LUMBAR FUSION Bilateral 2025    Procedure: Navigated revision L3-S1 laminectomy/decompression, revision L3-S1 posterior instrumented spinal fusion with TLIF;  Surgeon: Charles Mcrae MD;  Location: [facility];  Service: Orthopedics    MASS EXCISION Left 10/18/2024    Procedure: EXCISION BIOPSY TISSUE LESION/MASS UPPER EXTREMITY - Left index finger;  Surgeon: Leandro Campos MD;  Location:  MAIN OR;  Service: Orthopedics    SEPTOPLASTY      SPINE SURGERY  23    TONSILLECTOMY      TONSILLECTOMY AND ADENOIDECTOMY  1964    TRIGGER POINT INJECTION      UPPER GASTROINTESTINAL ENDOSCOPY  24       Meds/Allergies:    all medications and allergies reviewed    Allergies:   Allergies   Allergen Reactions    Fish-Derived Products - Food Allergy Anaphylaxis     Cannot have all seafood products    Shellfish-Derived Products - Food Allergy Anaphylaxis    Azithromycin Hives and Rash       Social History:     Marital Status:     Substance Use History:   Social History  "    Substance and Sexual Activity   Alcohol Use Not Currently    Alcohol/week: 1.0 standard drink of alcohol    Types: 1 Shots of liquor per week    Comment: Every 2or 3months     Social History     Tobacco Use   Smoking Status Former    Current packs/day: 0.00    Average packs/day: 1 pack/day for 24.2 years (24.2 ttl pk-yrs)    Types: Cigarettes    Start date: 2000    Quit date: 3/22/2024    Years since quittin.1    Passive exposure: Never   Smokeless Tobacco Never   Tobacco Comments    Smoked 0.5-1 ppd; quit in 2024.      Social History     Substance and Sexual Activity   Drug Use Never       Family History:  Reviewed    Physical Exam:     Vitals:   Blood Pressure: 137/83 (25 0700)  Pulse: 87 (25 0700)  Temperature: 97.7 °F (36.5 °C) (25 0542)  Temp Source: Tympanic (25 0542)  Respirations: 18 (25 0542)  Height: 5' 8\" (172.7 cm) (25 1642)  Weight - Scale: 97.3 kg (214 lb 6.4 oz) (25 0554)  SpO2: 96 % (25 0542)    Physical Exam  Vitals and nursing note reviewed.   Constitutional:       General: She is not in acute distress.     Appearance: Normal appearance. She is obese. She is not ill-appearing.   HENT:      Head: Normocephalic and atraumatic.   Cardiovascular:      Rate and Rhythm: Normal rate. Rhythm regularly irregular.      Pulses: Normal pulses.      Heart sounds: Normal heart sounds. No murmur heard.     No friction rub.   Pulmonary:      Effort: Pulmonary effort is normal. No respiratory distress.      Breath sounds: Normal breath sounds. No wheezing or rhonchi.   Abdominal:      General: Abdomen is flat. Bowel sounds are normal. There is no distension.      Palpations: Abdomen is soft. There is no mass.      Tenderness: There is no abdominal tenderness. There is no guarding or rebound.      Hernia: No hernia is present.   Musculoskeletal:      Cervical back: Normal range of motion and neck supple. No tenderness.      Right lower leg: Edema " (Minimal non-pitting edema) present.      Left lower leg: Edema (Minimal non-pitting edema) present.      Comments: Wearing TLSO brace.   Skin:     General: Skin is warm and dry.   Neurological:      Mental Status: She is alert and oriented to person, place, and time.   Psychiatric:         Mood and Affect: Mood normal.         Behavior: Behavior normal.         Additional Data:     Labs and imaging reviewed.    EKG Reviewed - last EKG on 4/10/25 atrial paced, QTc 470.    M*Modal software was used to dictate this note.  It may contain errors with dictating incorrect words or incorrect spelling. Please contact the provider directly with any questions.

## 2025-05-07 NOTE — CASE MANAGEMENT
Workmen's Comp information: [employer] comp,  is Joe Stamp [phone], fax: [phone]. Claim # [id number].  CM called Joe, left message on voice mail with CM contact information, and request for return phone call and information regarding need for clinical updates, DME and HHC companies to use for this patient.  CM met with patient, explained rehab routine and CM role with introduction. Patient lives alone in 3SH with 3STE, full flight +2 to get to BR , on second floor, full bath on first floor with walk in [facility]. Patient independent PTA,. Patient has RW x2, SPC x2, reacher and rollator. Patient has oupt therapy experience with [facility] . Patient will be going to daughter's home in [place] on discharge; her address is [address] [place] [address]. Daughter works from home, will be there if she needs anything. Daughters home  6 steps plus 6 steps to kitchen area. Patient  will sleep on a recliner on the main level. Patient has prescription coverage and gets medications in [facility], or [facility] while at her daughters. Patient is agreeable to HHA when she returns to her own home,if she needs them.CM explained team meeting process, and workmen's comp communications. CM will follow for discharge needs.  CM received [facility] return phone [facility] , she requested  initial intake to be faxed to her and LOUANN Suarez, DME requests go to [employer] ,[facility] [phone]. CM to call Daniela LEW CM at [phone] for HHC needs.

## 2025-05-07 NOTE — PCC OCCUPATIONAL THERAPY
5/13/25  Pt has met all OT goals and fx at [place] level, no further barriers noted. Pt with anticipated DC 5/14/25 with OP PT only.     5/6/25  65 y.o. female with pain beginning 2 years ago after an injury at work. She received a fusion of L3-S1 in 2023. 2 weeks after she returned to work she suffered a twisting injury which caused her to return to her surgeon. X-ray revealed loosening of multiple screws in her lumbar spine. She was offered an SI injection to help with low back pain. 5 hours after the injection she suffered a heart attack in which a she received an ICD and stent. On presentation to Ortho office, she claims that she cannot feel her left leg. She attempts to walk on a treadmill for her cardiac rehab in which her left leg will give out 10-15 times during her hour long rehab. She reports that she is currently in physical therapy that is providing no relief. Describes pain as crushing in nature. Located in low back. + numbness . + burning. Recommended for surgical intervention. On 5/1, she went to the OR with Ortho for Exploration of fusion mass, L3-S1; Removal of hardware, L3-S1; Arthrodesis, L5-S1 TLIF; Laminotomy/facetectomy, L5-S1; Arthrodesis, T4-5; Arthrodesis, T3-4; Posterior segmental instrumentation/pedicle screw fixation,L3-S1; EBL minimal, DARWIN drain intact. Postoperatively, she was cleared for WBAT to B/L LE, LSO brace when OOB, lumbar spinal precautions.  SLIM was consulted re: postop medical management. She was with noted B/L LE edema, and was initiated on PO Lasix for 3 doses total, with recommendations for prn Lasix.  PMH includes:CHF, CAD s/p stent, PE on Eliquis, Aflutter, hypothyroid, DM, anxiety, depression, GERD, MI, HLD, ischemic cardiomyopathy s/p ICD, migraines. PLOF was independent with BADLs using RW, SPC on the steps. She was receiving some assist with IADLs from friends, neighbors, family and has hired a  once a week. Currently Pt is performing below functional  baseline due to impairments in ROM d/t spinal precautions, weakness, pain, balance but demonstrates strong motivation to return to prior activity level and independence. Goals are set for mod I in 7-10 days (DC Wed 5/14/25) through training in [facility], compensatory strategies, task modifications, strengthening, and endurance training. Recommended DME: BSC, hip kit, folding walker tray, shower chair (pt reports she has shower chair at home).

## 2025-05-07 NOTE — ASSESSMENT & PLAN NOTE
Hgb currently 9.0.  Hgb 13.5 pre-operatively.  Likely acute blood loss anemia.  Obtain iron panel with next set of labs.  Continue to trend routine CBC.

## 2025-05-07 NOTE — ASSESSMENT & PLAN NOTE
Hx of L3-S1 fusion in 2023 due to work related injury.  Had been experiencing worsening LLE radiculopathy/lower back pain/ambulatory dysfunction.  MRI lumbar spine on 1/28/25 showed multilevel lumbar spondylosis without canal stenosis but there is mild L subarticular zone narrowing and mild bilateral foraminal stenosis at L3-L4 due to L sided disc protrusion at this level.    OR on 5/1 for elective bilateral navigated revision of L3-S1 laminectomy/decompression and revision of L3-S1 posterior instrumented spinal fusion and TLIF with Dr. Mcrae (Ortho/Spine).    LSO brace when OOB.  Neurovascular checks Q shift.  Ensure adequate pain control.  Monitor incision for s/s of infection.    Follow-up with Ortho/Spine as outpatient.    Primary team following.  PT/OT.

## 2025-05-07 NOTE — PROGRESS NOTES
PT Initial Evaluation       05/07/25 1000   Patient Data   Rehab Impairment Impairment of mobility, safety and Activities of Daily Living (ADLs) due to Neurologic Conditions:  03.9  Other Neurologic Disorder    Etiologic Dx: Lumbar Radiculopathy   Etiologic Diagnosis Lumbar Radiculopathy   Date of Onset 05/01/25  (Exploration of fusion mass, L3-S1; Removal of hardware, L3-S1; Arthrodesis, L5-S1 TLIF; Laminotomy/facetectomy, L5-S1; Arthrodesis, T4-5; Arthrodesis, T3-4; Posterior segmental instrumentation/pedicle screw fixation,L3-S1)   Support System   Name Lissett  (lives in [facility] is her primary contact, has two other sons , 1 lives in [place] and one lives in [place])   Relationship children   Able to provide 24 hour supervision No   Able to provide physical help?   (limited as they all not liove close. As per pt, her daughter stated that pt. can live with her initially  if needed)   Home Setup   Type of Home Multi Level   Method of Entry Stairs;Hand Rail Right   Number of Stairs 3  (has 2 curb like step to step out to her patio where she let's her dog out everyday)   Number of Stairs in Home 15  (13+landing+ 2 more)   In Home Hand Rail Right   First Floor Bathroom Full;Shower   First Floor Bathroom Accessibility Grab bars by toilet;Grab bars in tub/shower;Built in shower seat   Second Floor Bathroom Full;Tub;Shower;Combo;Curtain   First Floor Setup Available Yes   Home Modification Comment pending progress   Available Equipment Roller Walker;Single Point Cane;Rollator  (2 RW, 2 SPC)   Baseline Information   Outpatient Services Received Prior to Admission Physical Therapy   Outpatient Provider [facility]   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  Independent - Patient completed the activities by him/herself, with or without an assistive device, with no assistance from a helper.   Indoor-Mobility (Ambulation) 3. Independent - Patient completed the activities by him/herself, with or without an assistive device, with no assistance from a helper.   Stairs 3. Independent - Patient completed the activities by him/herself, with or without an assistive device, with no assistance from a helper.   Functional Cognition 3. Independent - Patient completed the activities by him/herself, with or without an assistive device, with no assistance from a helper.   Prior Device Used D. Walker  (for the last 6 mos)   Falls in the Last Year   Number of falls in the past 12 months   (>10 , legs gave out)   Type of Injury Associated with Fall No injury   Patient Preference   Nickname (Patient Preference) nance   Restrictions/Precautions   Precautions Fall Risk;Spinal precautions;Pain   RLE Weight Bearing Per Order WBAT   LLE Weight Bearing Per Order WBAT   ROM Restrictions   (lumbar spinal precautions)   Braces or Orthoses LSO   Pain Assessment   Pain Score 5   Pain Location/Orientation Orientation: Lower;Location: Back;Location: Incision   Hospital Pain Intervention(s) Rest   Toilet Transfer   Adaptive Equipment Walker;Grab Bar   Type of Assistance Needed Incidental touching   Toilet Transfer CARE Score 4   Toileting   Able to Pull Clothing down yes, up yes.   Transfer Bed/Chair/Wheelchair   Adaptive Equipment Roller Walker   Type of Assistance Needed Physical assistance   Physical Assistance Level 25% or less   Chair/Bed-to-Chair Transfer CARE Score 3   Roll Left and Right   Type of Assistance Needed Supervision   Comment level bed, log rolling   Roll Left and Right CARE Score 4   Sit to Lying   Type of Assistance Needed Incidental touching   Comment level bed, using bedrail on R. Pt. already purchased a bedrail at home   Sit to Lying CARE Score 4   Lying to Sitting on Side of Bed    Type of Assistance Needed Incidental touching   Comment level bed, using bedrail on R. Pt. already purchased a bedrail at home   Lying to Sitting on Side of Bed CARE Score 4   Sit to Stand   Type of Assistance Needed Physical assistance;Incidental touching   Physical Assistance Level 25% or less   Comment CGA/ min A with RW   Sit to Stand CARE Score 3   Picking Up Object   Type of Assistance Needed Incidental touching   Comment at baseline, pt. uses a grabber she got from the [employer], her daughter already purchased a new one for d/c.   Picking Up Object CARE Score 4   Car Transfer   Type of Assistance Needed Physical assistance   Physical Assistance Level 25% or less   Comment simulated at nu step   Car Transfer CARE Score 3   Ambulation   Primary Mode of Locomotion Prior to Admission Walk   Distance Walked (feet) 120 ft  (186, 160 feet, shorter distances for task)   Assist Device Roller Walker   Gait Pattern Inconsistant Meka;Improper weight shift;Narrow QIANA   Limitations Noted In Endurance;Balance   Walk Assist Level Contact Guard;Minimum Assist   Walk 10 Feet   Type of Assistance Needed Incidental touching   Comment with RW   Walk 10 Feet CARE Score 4   Walk 50 Feet with Two Turns   Type of Assistance Needed Incidental touching;Physical assistance   Physical Assistance Level 25% or less   Comment with RW   Walk 50 Feet with Two Turns CARE Score 3   Walk 150 Feet   Type of Assistance Needed Physical assistance;Incidental touching   Physical Assistance Level 25% or less   Comment with RW   Walk 150 Feet CARE Score 3   Walking 10 Feet on Uneven Surfaces   Type of Assistance Needed Physical assistance   Physical Assistance Level 25% or less   Comment over floor mat using RW   Walking 10 Feet on Uneven Surfaces CARE Score 3   Wheelchair mobility   Findings Pt. anticipated to be ambulatory at d/c.   Wheel 50 Feet with Two Turns   Reason if not Attempted Activity not applicable   Wheel 50 Feet with Two Turns  "CARE Score 9   Wheel 150 Feet   Reason if not Attempted Activity not applicable   Wheel 150 Feet CARE Score 9   Curb or Single Stair   Style negotiated Curb   Type of Assistance Needed Physical assistance   Physical Assistance Level 25% or less   Comment 4\" curb like step( scale) using RW   1 Step (Curb) CARE Score 3   4 Steps   Type of Assistance Needed Physical assistance   Physical Assistance Level Total assistance   Comment R HR+ SPC as she would do it at baseline. She needed one mod A boost on 2nd step due to pain, 2nd person standby for safety   4 Steps CARE Score 1   12 Steps   Reason if not Attempted Safety concerns   12 Steps CARE Score 88   Stairs   Type Trainer Steps;Curb   # of Steps 4   Weight Bearing Precautions Fall Risk   Assist Devices Single Rail;Cane   Comprehension   QI: Comprehension 4. Undestands: Clear comprehension without cues or repetitions   Expression   QI: Expression 4. Express complex messages without difficulty and with speech that is clear and easy to understan   RLE Assessment   RLE Assessment WFL  (4/5)   LLE Assessment   LLE Assessment X   Strength LLE   L Hip Flexion 3+/5   L Hip ABduction 3+/5   L Hip ADduction 3+/5   L Knee Flexion 3+/5   L Knee Extension 3+/5   L Ankle Dorsiflexion 4/5   L Ankle Plantar Flexion 4/5   Sensation   Light Touch Severe deficits in the LLE  (can detect pressure)   Additional Comments reported numbness on whole L LE   Cognition   Arousal/Participation Alert;Cooperative   Attention Within functional limits   Memory Within functional limits   Following Commands Follows all commands and directions without difficulty   Objective Measure   PT Measure(s) 2MWT:186 feet  10MWT: .5m/sec   Therapeutic Exercise   Therapeutic Exercise/Activity Nu step level 1, LE only X 10 mins   Discharge Information   Vocational Plan Return to work   Barriers to Return to Vocation Environmental Access;Skill Set Limitation;Strength;Endurance;Other  (spinal precautions) " "  Patient's Discharge Plan return home independently   Patient's Rehab Expectations \"I have to be independent and be able to go back to work soon\"   Barriers to Discharge Home Limited Family Support;Unsafe Home Setup;Decreased Endurance;Decreased Strength;Safety Considerations;Pain   Impressions pt. is a 66 y/o female who was admitted on 5/1 for elective L3-S1 laminectomy/decompression, revision L3-S1 post instrumented spinal fusion with TLIF by Dr. Mcrae.  Pt. Initially had L3-S1 fusion surgery on 2023, returned full time back to work, suffered a twisting injury which caused her to return to her original surgeon with X-rays revealed loosening of multiple screws in her lumbar spine. Since then, pt. Suffered radiculopathy and low back pain. Pt's PMhx includes: ICD placement on 3/27/24, anemia, atrial flutter, hypothyroid, GERD Type 2 DM, CAD , HFrEF. Pt. Currently needing LSO brace when OOB , spinal precautions and WBAT. Pt. Now admitted to [facility] and mod complexity eval completed with the following barriers / deficits identified: B LE weakness L> R, dec L LE sensation , dec activity tolerance with mod to severe pain, and dec balance. Pt. Currently needing min A with functional mobility using RW which is below baseline for patient. Pt. Was fully indep PTA and was working full time at a [employer] near [place] prior to recent surgery. Pt. Lives alone in a [facility] and has a dog and 2 cats. Pt. Has 3 children but all live about 45 mins to 2 hours away from her. She has an option to be on first floor set up or go to her daughter's house if she really needs to. Pt. Is an excellent rehab candidate and will benefit form skilled PT to maximize  strength, endurance, balance, safety and independence prior to d/c. ELOS 7-10 days.   PT Therapy Minutes   PT Time In 1000   PT Time Out 1130   PT Total Time (minutes) 90   PT Mode of treatment - Individual (minutes) 90   PT Mode of treatment - Concurrent (minutes) 0   PT Mode " of treatment - Group (minutes) 0   PT Mode of treatment - Co-treat (minutes) 0   PT Mode of Treatment - Total time(minutes) 90 minutes   PT Cumulative Minutes 90   Cumulative Minutes   Cumulative therapy minutes 90

## 2025-05-07 NOTE — PROGRESS NOTES
05/07/25 0655   Patient Data   Rehab Impairment Impairment of mobility, safety and Activities of Daily Living (ADLs) due to Neurologic Conditions:  03.9  Other Neurologic Disorder   Etiologic Diagnosis Lumbar Radiculopathy   Date of Onset 05/01/25   Insurance Information   Primary Payer Workers comp through [employer]   Support System   Name Ovidio Galeana Brad Relationship children  (dtr lives within 30min, sons are hr plus away.)   Able to provide 24 hour supervision No   Able to provide physical help?   (limited due to proximity)   Home Setup   Type of Home Multi Level   Method of Entry Stairs   Number of Stairs 3  (right side railing)   Number of Stairs in Home 15  (13 +2 with landing)   In Home Hand Rail Right   First Floor Bathroom Full;Shower;Grab Bars;Curtain;Built-in shower seat  (WIS)   First Floor Bathroom Accessibility Grab bars by toilet;Grab bars in tub/shower;Raised toilet seat;Built in shower seat   Second Floor Bathroom Full;Tub;Shower;Combo;Curtain  (not using due to inability to step over tub. Used to like to bathe in the tub 1-2x/wk)   Second Floor Bathroom Accessibility Grab bars by toilet  (low toilet)   First Floor Setup Available Yes   Home Modifications Necessary?   (if absolutely necessary could sleep in reclining couch but she has difficulty putting the legs down)   Home Modification Comment TBD   Available Equipment Single Point Cane;Roller Walker;Rollator  (RW on each floor, SPC on the steps, rollator in car for community)   Baseline Information   Vocation Work Full Time  (currently on workmans comp due to injury)   Transportation    Prior Device(s) Used Roller Walker;Rollator;Single Point Cane  (built in shower seat, grab bars)   Prior IADL Participation   Money Management   (independent)   Meal Preparation Full Participation  (on good days would meal prep for several days and freeze them, neighbor helps take it up/down from the basement  for her to extra freezer.)   Laundry Full Participation  ( and front , using reacher. granddtr helped occasionally)   Home Cleaning Cleaning service   Prior Level of Function   Self-Care 3. Independent - Patient completed the activities by him/herself, with or without an assistive device, with no assistance from a helper.   Indoor-Mobility (Ambulation) 3. Independent - Patient completed the activities by him/herself, with or without an assistive device, with no assistance from a helper.   Stairs 3. Independent - Patient completed the activities by him/herself, with or without an assistive device, with no assistance from a helper.   Functional Cognition 3. Independent - Patient completed the activities by him/herself, with or without an assistive device, with no assistance from a helper.   Prior Device Used D. Walker   Falls in the Last Year   Number of falls in the past 12 months 30  (left leg gives out)   Type of Injury Associated with Fall No injury   Patient Preference   Nickname (Patient Preference) [place]   Restrictions/Precautions   Precautions Spinal precautions;Pain;Fall Risk   RUE Weight Bearing Per Order WBAT   LUE Weight Bearing Per Order WBAT   RLE Weight Bearing Per Order WBAT   LLE Weight Bearing Per Order WBAT   Braces or Orthoses LSO   Pain Assessment   Pain Assessment Tool 0-10   Pain Score 2   Pain Location/Orientation Orientation: Lower;Location: Back   Pain Onset/Description Onset: Ongoing   Patient's Stated Pain Goal No pain   Hospital Pain Intervention(s) Medication (See MAR)   Eating Assessment   Type of Assistance Needed Independent   Physical Assistance Level No physical assistance   Eating CARE Score 6   Oral Hygiene   Type of Assistance Needed Incidental touching   Physical Assistance Level No physical assistance   Comment standing at sink   Oral Hygiene CARE Score 4   Grooming   Able To Apply Make-up;Comb/Brush Hair   Findings seated   Tub/Shower Transfer   Limitations  Noted In Balance;LE Strength   Adaptive Equipment Grab Bars;Seat with Back   Assessed Shower   Findings RW to GB with CGA, side stepping into the shower to sit on shower seat   Shower/Bathe Self   Type of Assistance Needed Physical assistance   Physical Assistance Level 26%-50%   Comment showered seated with assist for buttocks and below knees   Shower/Bathe Self CARE Score 3   Bathing   Assessed Bath Style Shower   Anticipated D/C Bath Style Shower   Able to Gather/Transport No   Able to Adjust Water Temperature Yes   Dressing/Undressing Clothing   Remove UB Clothes Other  (hospital gown)   Don UB Clothes [place];Pullover Shirt   Type of Assistance Needed Physical assistance   Physical Assistance Level 25% or less   Comment seated outside shower Pt able to manage clothes with min A to bring LSO around her back, she was able to close the LSO and remove it herself   Upper Body Dressing CARE Score 3   Remove LB Clothes Socks;Undergarment   Don LB Clothes Undergarment;Socks;Pants   Type of Assistance Needed Physical assistance   Physical Assistance Level 26%-50%   Comment due to spinal precautions Pt unable to manage threading over her feet. unable to cross leg up without feeling pulling at low back. Plan to initiate LHAE next session.   Lower Body Dressing CARE Score 3   Putting On/Taking Off Footwear   Type of Assistance Needed Physical assistance   Physical Assistance Level Total assistance   Comment don/doff slipper socks   Putting On/Taking Off Footwear CARE Score 1   Toileting Hygiene   Type of Assistance Needed Incidental touching   Physical Assistance Level No physical assistance   Comment CGA for anterior hygiene and CM   Toileting Hygiene CARE Score 4   Toilet Transfer   Surface Assessed Standard Toilet   Transfer Technique Standard   Limitations Noted In Balance;LE Strength   Adaptive Equipment (S)  Grab Bar  (Pt has a GB in downstairs BR, is interested in a BSC over the upstairs toilet at discharge)   Type of  "Assistance Needed Incidental touching   Physical Assistance Level No physical assistance   Comment CGA with use of GB   Toilet Transfer CARE Score 4   Sit to Stand   Type of Assistance Needed Physical assistance   Physical Assistance Level 25% or less   Comment CG/min A with RW   Sit to Stand CARE Score 3   Comprehension   QI: Comprehension 4. Undestands: Clear comprehension without cues or repetitions   Expression   QI: Expression 4. Express complex messages without difficulty and with speech that is clear and easy to understan   Social Interaction   Social Interaction (FIM) 7 - Interacts appropriately without assistive device, medication or helper   Problem Solving   Problem solving (FIM) 7 - Solves complex problems consistently without cues/ extra time   Memory   Memory (FIM) 7 - Remembers daily routines   Cognition   Overall Cognitive Status WFL   Arousal/Participation Alert;Cooperative   Attention Within functional limits   Orientation Level Oriented X4   Memory Within functional limits   Following Commands Follows all commands and directions without difficulty   Discharge Information   Vocational Plan Return to work  (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 female with pain beginning 2 years ago after an injury at work. She received a fusion of L3-S1 in 2023. 2 weeks after she returned to work she suffered a twisting injury which caused her to return to her surgeon. X-ray revealed loosening of multiple screws in her lumbar spine. She was offered an SI injection to help with " low back pain. 5 hours after the injection she suffered a heart attack in which a she received an ICD and stent. On presentation to Ortho office, she claims that she cannot feel her left leg. She attempts to walk on a treadmill for her cardiac rehab in which her left leg will give out 10-15 times during her hour long rehab. She reports that she is currently in physical therapy that is providing no relief. Describes pain as crushing in nature. Located in low back. + numbness . + burning. Recommended for surgical intervention. On 5/1, she went to the OR with Ortho for Exploration of fusion mass, L3-S1; Removal of hardware, L3-S1; Arthrodesis, L5-S1 TLIF; Laminotomy/facetectomy, L5-S1; Arthrodesis, T4-5; Arthrodesis, T3-4; Posterior segmental instrumentation/pedicle screw fixation,L3-S1; EBL minimal, DARWIN drain intact. Postoperatively, she was cleared for WBAT to B/L LE, LSO brace when OOB, lumbar spinal precautions.  JILLIAN was consulted re: postop medical management. She was with noted B/L LE edema, and was initiated on PO Lasix for 3 doses total, with recommendations for prn Lasix.  PMH includes:CHF, CAD s/p stent, PE on Eliquis, Aflutter, hypothyroid, DM, anxiety, depression, GERD, MI, HLD, ischemic cardiomyopathy s/p ICD, migraines. PLOF was independent with BADLs using RW, SPC on the steps. She was receiving some assist with IADLs from friends, neighbors, family and has hired a  once a week. Currently Pt is performing below functional baseline due to impairments in ROM d/t spinal precautions, weakness, pain, balance but demonstrates strong motivation to return to prior activity level and independence. Goals are set for mod I in 7-10days through training in LHAE, compensatory strategies, task modifications, strengthening, and endurance training.   OT Therapy Minutes   OT Time In 0700   OT Time Out 0830   OT Total Time (minutes) 90   OT Mode of treatment - Individual (minutes) 90   OT Mode of treatment -  Concurrent (minutes) 0   OT Mode of treatment - Group (minutes) 0   OT Mode of treatment - Co-treat (minutes) 0   OT Mode of Treatment - Total time(minutes) 90 minutes   OT Cumulative Minutes 90   Cumulative Minutes   Cumulative therapy minutes 90

## 2025-05-07 NOTE — ASSESSMENT & PLAN NOTE
S/p pacemaker.  Continue home Toprol XL 50mg at HS and Tikosyn 125mcg BID for rate control.  Continue Eliquis 5mg BID for anticoagulation.  Monitor routine VS.  Replete electrolytes as needed.  Follows with Dr. Bell (Cardiology) as outpatient.

## 2025-05-07 NOTE — PCC PHYSICAL THERAPY
Pt. is a 64 y/o female who was admitted on 5/1 for elective L3-S1 laminectomy/decompression, revision L3-S1 post instrumented spinal fusion with TLIF by Dr. Mcrae.  Pt. Initially had L3-S1 fusion surgery in 2023, returned full time back to work, suffered a twisting injury which caused her to return to her original surgeon with X-rays revealed loosening of multiple screws in her lumbar spine. Since then, pt. Suffered radiculopathy and low back pain. Pt's PMhx includes: ICD placement on 3/27/24, anemia, atrial flutter, hypothyroid, GERD Type 2 DM, CAD , HFrEF. Pt. Currently needing LSO brace when OOB , spinal precautions and WBAT. Has RW and SPC.     Admitted to [facility] 5/6/25, with barriers / deficits identified: B LE weakness L> R, dec L LE sensation , dec activity tolerance with mod to severe pain, and dec balance. Pt. Currently needing min A with functional mobility using RW which is below baseline for patient. Pt. Was fully indep PTA and was working full time at a [employer] near [place] prior to recent surgery. Pt. Lives alone in a [facility] and has a dog and 2 cats. Pt. Has 3 children but all live about 45 mins to 2 hours away from her. She has an option to be on first floor set up or go to her daughter's house if she really needs to. Pt. Is an excellent rehab candidate and will benefit form skilled PT to maximize  strength, endurance, balance, safety and independence prior to d/c. ELOS 7-10 days.     5/12/25   IRPs granted with RW. Plan for DC home to daughters Wednesday 5/14/25. F/u with surgeon Monday 5/19/25, await surgeon recommendation for additional post op therapy needs.

## 2025-05-07 NOTE — PROGRESS NOTES
PT ICP       05/07/25 1000   Rehab Team Goals   Transfer Team Goal Patient will be independent with transfers with least restrictive device upon completion of rehab program   Locomotion Team Goal Patient will be independent with locomotion with least restrictive device upon completion of rehab program   Rehab Team Interventions   PT Interventions Gait Training;Therapeutic Exercise;Neuromuscualr Reeducation;Transfer Training;Bed Mobility;Modalities;Patient/Family Education   PT Transfer Goal   Roll left and right Goal 06. Independent - Patient completes the activity by him/herself with no assistance from a helper.   Sit to lying Goal 06. Independent - Patient completes the activity by him/herself with no assistance from a helper.   Lying to sitting on side of bed Goal 06. Independent - Patient completes the activity by him/herself with no assistance from a helper.   Sit to stand Goal 06. Independent - Patient completes the activity by him/herself with no assistance from a helper.   Chair/bed-to-chair transfer Goal 06. Independent - Patient completes the activity by him/herself with no assistance from a helper.   Car Transfer Goal 05. Setup or clean-up assistance - [place] SETS UP or CLEANS UP, patient completes activity. [place] assists only prior to or following the activity.   Assistive Device Roller Walker   Safety Precautions Other  (spinal precautions)   Environment Level Surface;Well Lit   Status Ongoing;Target goal - one week;Target goal - two weeks  (7-10 days)   Locomotion Goal   Primary discharge mode of locomotion Walking   Target Walk Distance 300 ft   Assist Device Roller Walker   Environment Level Surface;Well Lit   Walk 10 feet Goal 06. Independent - Patient completes the activity by him/herself with no assistance from a helper.   Walk 50 feet with 2 turns Goal 06. Independent - Patient completes the activity by him/herself with no assistance from a helper.   Walk 150 feet Goal 06. Independent - Patient  completes the activity by him/herself with no assistance from a helper.   Walking 10 feet on uneven surface 06. Independent - Patient completes the activity by him/herself with no assistance from a helper.   Walking Goal Status Ongoing;Target goal - one week;Target goal - two weeks  (7-10 days)   Wheel 50 feet with 2 turns Goal 09. Not applicable   Wheel 150 feet Goal 09. Not applicable   Environment Level Surface;Well Lit   Wheelchair Goal Status Ongoing;Target goal - one week;Target goal - two weeks  (7-10 days)   Stairs Goal   1 step or curb goal 06. Independent - Patient completes the activity by him/herself with no assistance from a helper.   4 steps Goal 06. Independent - Patient completes the activity by him/herself with no assistance from a helper.   12 steps Goal 06. Independent - Patient completes the activity by him/herself with no assistance from a helper.   Assist Level Moderate [place]   Number of Stairs 15   Technique Non-reciprocal   Hand Rail Right  (+ SPC)   Status Ongoing;Target goal - one week;Target goal - two weeks  (7-10 days)   Object Retrieval Goal   Picking up object Goal 06. Independent - Patient completes the activity by him/herself with no assistance from a helper.   Assistive Device  Reacher   Small Object Picked Up marker

## 2025-05-08 LAB
25(OH)D3 SERPL-MCNC: 19.9 NG/ML (ref 30–100)
ANION GAP SERPL CALCULATED.3IONS-SCNC: 8 MMOL/L (ref 4–13)
BASOPHILS # BLD AUTO: 0.04 THOUSANDS/ÂΜL (ref 0–0.1)
BASOPHILS NFR BLD AUTO: 1 % (ref 0–1)
BUN SERPL-MCNC: 10 MG/DL (ref 5–25)
CALCIUM SERPL-MCNC: 9.2 MG/DL (ref 8.4–10.2)
CHLORIDE SERPL-SCNC: 102 MMOL/L (ref 96–108)
CO2 SERPL-SCNC: 28 MMOL/L (ref 21–32)
CREAT SERPL-MCNC: 0.78 MG/DL (ref 0.6–1.3)
EOSINOPHIL # BLD AUTO: 0.39 THOUSAND/ÂΜL (ref 0–0.61)
EOSINOPHIL NFR BLD AUTO: 5 % (ref 0–6)
ERYTHROCYTE [DISTWIDTH] IN BLOOD BY AUTOMATED COUNT: 16.3 % (ref 11.6–15.1)
FERRITIN SERPL-MCNC: 29 NG/ML (ref 30–307)
GFR SERPL CREATININE-BSD FRML MDRD: 78 ML/MIN/1.73SQ M
GLUCOSE P FAST SERPL-MCNC: 112 MG/DL (ref 65–99)
GLUCOSE SERPL-MCNC: 106 MG/DL (ref 65–140)
GLUCOSE SERPL-MCNC: 112 MG/DL (ref 65–140)
GLUCOSE SERPL-MCNC: 118 MG/DL (ref 65–140)
GLUCOSE SERPL-MCNC: 121 MG/DL (ref 65–140)
GLUCOSE SERPL-MCNC: 124 MG/DL (ref 65–140)
HCT VFR BLD AUTO: 32.6 % (ref 34.8–46.1)
HGB BLD-MCNC: 9.9 G/DL (ref 11.5–15.4)
IMM GRANULOCYTES # BLD AUTO: 0.03 THOUSAND/UL (ref 0–0.2)
IMM GRANULOCYTES NFR BLD AUTO: 0 % (ref 0–2)
IRON SATN MFR SERPL: 7 % (ref 15–50)
IRON SERPL-MCNC: 28 UG/DL (ref 50–212)
LYMPHOCYTES # BLD AUTO: 2.23 THOUSANDS/ÂΜL (ref 0.6–4.47)
LYMPHOCYTES NFR BLD AUTO: 27 % (ref 14–44)
MAGNESIUM SERPL-MCNC: 2.2 MG/DL (ref 1.9–2.7)
MCH RBC QN AUTO: 25.8 PG (ref 26.8–34.3)
MCHC RBC AUTO-ENTMCNC: 30.4 G/DL (ref 31.4–37.4)
MCV RBC AUTO: 85 FL (ref 82–98)
MONOCYTES # BLD AUTO: 0.9 THOUSAND/ÂΜL (ref 0.17–1.22)
MONOCYTES NFR BLD AUTO: 11 % (ref 4–12)
NEUTROPHILS # BLD AUTO: 4.84 THOUSANDS/ÂΜL (ref 1.85–7.62)
NEUTS SEG NFR BLD AUTO: 56 % (ref 43–75)
NRBC BLD AUTO-RTO: 0 /100 WBCS
PLATELET # BLD AUTO: 286 THOUSANDS/UL (ref 149–390)
PMV BLD AUTO: 9.6 FL (ref 8.9–12.7)
POTASSIUM SERPL-SCNC: 4 MMOL/L (ref 3.5–5.3)
RBC # BLD AUTO: 3.84 MILLION/UL (ref 3.81–5.12)
SODIUM SERPL-SCNC: 138 MMOL/L (ref 135–147)
TIBC SERPL-MCNC: 376.6 UG/DL (ref 250–450)
TRANSFERRIN SERPL-MCNC: 269 MG/DL (ref 203–362)
UIBC SERPL-MCNC: 349 UG/DL (ref 155–355)
WBC # BLD AUTO: 8.43 THOUSAND/UL (ref 4.31–10.16)

## 2025-05-08 PROCEDURE — 97535 SELF CARE MNGMENT TRAINING: CPT

## 2025-05-08 PROCEDURE — 82948 REAGENT STRIP/BLOOD GLUCOSE: CPT

## 2025-05-08 PROCEDURE — 97116 GAIT TRAINING THERAPY: CPT

## 2025-05-08 PROCEDURE — 97530 THERAPEUTIC ACTIVITIES: CPT

## 2025-05-08 PROCEDURE — 85025 COMPLETE CBC W/AUTO DIFF WBC: CPT | Performed by: NURSE PRACTITIONER

## 2025-05-08 PROCEDURE — 83540 ASSAY OF IRON: CPT | Performed by: NURSE PRACTITIONER

## 2025-05-08 PROCEDURE — 83735 ASSAY OF MAGNESIUM: CPT | Performed by: NURSE PRACTITIONER

## 2025-05-08 PROCEDURE — 80048 BASIC METABOLIC PNL TOTAL CA: CPT | Performed by: NURSE PRACTITIONER

## 2025-05-08 PROCEDURE — 82306 VITAMIN D 25 HYDROXY: CPT | Performed by: NURSE PRACTITIONER

## 2025-05-08 PROCEDURE — 82728 ASSAY OF FERRITIN: CPT | Performed by: NURSE PRACTITIONER

## 2025-05-08 PROCEDURE — 83550 IRON BINDING TEST: CPT | Performed by: NURSE PRACTITIONER

## 2025-05-08 PROCEDURE — 97110 THERAPEUTIC EXERCISES: CPT

## 2025-05-08 PROCEDURE — 99232 SBSQ HOSP IP/OBS MODERATE 35: CPT | Performed by: PHYSICAL MEDICINE & REHABILITATION

## 2025-05-08 PROCEDURE — 99232 SBSQ HOSP IP/OBS MODERATE 35: CPT | Performed by: NURSE PRACTITIONER

## 2025-05-08 RX ORDER — ISOSORBIDE MONONITRATE 30 MG/1
30 TABLET, EXTENDED RELEASE ORAL 2 TIMES DAILY
Status: DISCONTINUED | OUTPATIENT
Start: 2025-05-08 | End: 2025-05-14 | Stop reason: HOSPADM

## 2025-05-08 RX ORDER — ERGOCALCIFEROL 1.25 MG/1
50000 CAPSULE, LIQUID FILLED ORAL WEEKLY
Status: DISCONTINUED | OUTPATIENT
Start: 2025-05-08 | End: 2025-05-14 | Stop reason: HOSPADM

## 2025-05-08 RX ADMIN — DOFETILIDE 125 MCG: 0.12 CAPSULE ORAL at 21:18

## 2025-05-08 RX ADMIN — CLOPIDOGREL BISULFATE 75 MG: 75 TABLET, FILM COATED ORAL at 08:07

## 2025-05-08 RX ADMIN — OXYCODONE HYDROCHLORIDE 5 MG: 5 TABLET ORAL at 08:11

## 2025-05-08 RX ADMIN — PREGABALIN 75 MG: 75 CAPSULE ORAL at 16:44

## 2025-05-08 RX ADMIN — ACETAMINOPHEN 650 MG: 325 TABLET, FILM COATED ORAL at 12:02

## 2025-05-08 RX ADMIN — DOCUSATE SODIUM 100 MG: 100 CAPSULE, LIQUID FILLED ORAL at 08:06

## 2025-05-08 RX ADMIN — PANTOPRAZOLE SODIUM 40 MG: 40 TABLET, DELAYED RELEASE ORAL at 06:07

## 2025-05-08 RX ADMIN — PANTOPRAZOLE SODIUM 40 MG: 40 TABLET, DELAYED RELEASE ORAL at 16:46

## 2025-05-08 RX ADMIN — ATORVASTATIN CALCIUM 80 MG: 80 TABLET, FILM COATED ORAL at 16:48

## 2025-05-08 RX ADMIN — APIXABAN 5 MG: 5 TABLET, FILM COATED ORAL at 08:07

## 2025-05-08 RX ADMIN — ACETAMINOPHEN 650 MG: 325 TABLET, FILM COATED ORAL at 23:16

## 2025-05-08 RX ADMIN — LEVOTHYROXINE SODIUM 75 MCG: 75 TABLET ORAL at 06:07

## 2025-05-08 RX ADMIN — OXYCODONE HYDROCHLORIDE 5 MG: 5 TABLET ORAL at 20:54

## 2025-05-08 RX ADMIN — SACUBITRIL AND VALSARTAN 1 TABLET: 24; 26 TABLET, FILM COATED ORAL at 16:44

## 2025-05-08 RX ADMIN — LACTULOSE 30 G: 20 SOLUTION ORAL at 16:46

## 2025-05-08 RX ADMIN — ACETAMINOPHEN 650 MG: 325 TABLET, FILM COATED ORAL at 00:05

## 2025-05-08 RX ADMIN — SENNOSIDES 8.6 MG: 8.6 TABLET, FILM COATED ORAL at 08:06

## 2025-05-08 RX ADMIN — DOFETILIDE 125 MCG: 0.12 CAPSULE ORAL at 08:08

## 2025-05-08 RX ADMIN — EMPAGLIFLOZIN 10 MG: 10 TABLET, FILM COATED ORAL at 08:07

## 2025-05-08 RX ADMIN — PREGABALIN 75 MG: 75 CAPSULE ORAL at 08:07

## 2025-05-08 RX ADMIN — FLUTICASONE PROPIONATE 1 SPRAY: 50 SPRAY, METERED NASAL at 08:11

## 2025-05-08 RX ADMIN — PREGABALIN 75 MG: 75 CAPSULE ORAL at 21:18

## 2025-05-08 RX ADMIN — ISOSORBIDE MONONITRATE 30 MG: 30 TABLET, EXTENDED RELEASE ORAL at 21:18

## 2025-05-08 RX ADMIN — OXYCODONE HYDROCHLORIDE 5 MG: 5 TABLET ORAL at 01:03

## 2025-05-08 RX ADMIN — DOCUSATE SODIUM 100 MG: 100 CAPSULE, LIQUID FILLED ORAL at 16:46

## 2025-05-08 RX ADMIN — ACETAMINOPHEN 650 MG: 325 TABLET, FILM COATED ORAL at 16:48

## 2025-05-08 RX ADMIN — ACETAMINOPHEN 650 MG: 325 TABLET, FILM COATED ORAL at 06:07

## 2025-05-08 RX ADMIN — OXYCODONE HYDROCHLORIDE 5 MG: 5 TABLET ORAL at 12:16

## 2025-05-08 RX ADMIN — ERGOCALCIFEROL 50000 UNITS: 1.25 CAPSULE ORAL at 12:02

## 2025-05-08 RX ADMIN — APIXABAN 5 MG: 5 TABLET, FILM COATED ORAL at 21:18

## 2025-05-08 NOTE — PROGRESS NOTES
05/08/25 1230   Pain Assessment   Pain Assessment Tool 0-10   Pain Score 3   Pain Location/Orientation Orientation: Lower;Location: Back   Pain Radiating Towards centralized   Pain Onset/Description Descriptor: Sore;Frequency: Intermittent   Effect of Pain on Daily Activities tolerated   Patient's Stated Pain Goal No pain   Hospital Pain Intervention(s) Rest  (requesting to get into bed end of session)   Multiple Pain Sites No   Restrictions/Precautions   Precautions Fall Risk;Spinal precautions   RUE Weight Bearing Per Order WBAT   LUE Weight Bearing Per Order WBAT   RLE Weight Bearing Per Order WBAT   LLE Weight Bearing Per Order WBAT   Braces or Orthoses (S)  LSO  (spinal prec)   Tub/Shower Transfer   Findings (S)  complete shower transfer next session   Lower Body Dressing   Type of Assistance Needed Supervision;Adaptive equipment   Comment introduced LHAE to inc indep with LB dressing. pt palced pants on floor and used LHR to thread pants at SUP level   Lower Body Dressing CARE Score 4   Putting On/Taking Off Footwear   Type of Assistance Needed Supervision;Adaptive equipment   Comment utilized dressing stick to doff socks. educ provided for sock aid, pt demonstrating G carryover. pt has laces pretied and able to slip on sneakers at SUP level   Putting On/Taking Off Footwear CARE Score 4   Picking Up Object   Type of Assistance Needed Incidental touching;Adaptive equipment   Comment placed various clothing on the floor. pt able to retrieve with use of LHR. LHR was a bit short but pt able to bend at knees and reach lcothign with LHR   Picking Up Object CARE Score 4   Sit to Lying   Type of Assistance Needed Supervision   Comment level bed, able to manage BLE   Sit to Lying CARE Score 4   Sit to Stand   Type of Assistance Needed Supervision;Adaptive equipment   Comment CS with RW   Sit to Stand CARE Score 4   Bed-Chair Transfer   Type of Assistance Needed Supervision;Adaptive equipment   Comment CS SPT with RW    Chair/Bed-to-Chair Transfer CARE Score 4   Toileting Hygiene   Type of Assistance Needed Supervision   Comment pt voided this session. able to manage hygiene/CM at DS level   Toileting Hygiene CARE Score 4   Toilet Transfer   Type of Assistance Needed Supervision;Adaptive equipment   Comment SUP SPT with RW. Pt will need BSC for home as she reports [facility] bathroom has a lower toilet   Toilet Transfer CARE Score 4   Meal Prep   Meal Prep Level Walker  (folding walker tray)   Meal Prep Level of Assistance Distant supervision   Meal Preparation Pt reporting she has many pre prepped meals at home and she mainly warms up in the microwave. Therapist introduced folding walker tray to inc independence. Pt able to retrieve frozen meal and place on tray to transport at SUP level. Simulated placing meal over stove top into microwave and then return to freezer at SUP level. Pt can benefit from using folding walker tray to inc safety and independence.   Light Housekeeping   Light Housekeeping Level Walker  (folding walker tray)   Light Housekeeping Level of Assistance Close supervision   Light Housekeeping pt retrieved clothing from floor with LHR and placed on folding walker tray to transport. Placed clothign in makeshift front load washer/dryer. Pt utilized LHR to retrieve and tranport clothign from washer>dryer. Able to complete at SUP level, no LOB or c/o pain while copleting task. Once clothing retrieved, pt completed folding of clothes in stance.   Exercise Tools   Exercise Tools Yes   Other Exercise Tool 1 seated engaged pt in UE TE with 2# 2x15 to cont to inc fx strength, activity tolerance. tolerated well with rest break in between each set.   Cognition   Overall Cognitive Status WFL   Arousal/Participation Alert;Cooperative   Attention Within functional limits   Orientation Level Oriented X4   Memory Within functional limits   Following Commands Follows all commands and directions without difficulty   Additional  Activities   Additional Activities Comments Engaged pt in fx mobility around RN unit x2, able to complete at SUP level with use of RW, no LOB observed and no rest break.   Activity Tolerance   Activity Tolerance Patient tolerated treatment well   Assessment   Treatment Assessment Engaged pt in 90mins of skilled OT services with focus on meal prep, introduced LHAE, laundry, toileting and UE TE. Pt receptive to all educ and recommended AD/DME. DME order placed for BSC, hip kit, folding walker tray. Pt reports she has shower chair at home. Pt can benefit from shower transfer. Pt demonstrating G progress and fx at SUP level with RW. During session pt reporting plan is for her to DC to dtr home where she has a curb AYESHA, split level home and 6-7 steps in between each floor. Reports she would have a bedroom on the top level with walk in shower. Pt can cont to benefit from further skilled OT services with focus on carryover of LHAE, fx mobility, activity tolerance, transfer, LSO brace mngmnt to inc fx performance and independence.   Prognosis Good   Problem List Decreased strength;Decreased endurance;Decreased mobility;Pain;Orthopedic restrictions   Barriers to Discharge Inaccessible home environment;Decreased caregiver support   Plan   Treatment/Interventions ADL retraining;Functional transfer training;Therapeutic exercise;Endurance training;Patient/family training;Bed mobility;Compensatory technique education   Progress Progressing toward goals   Discharge Recommendation   Rehab Resource Intensity Level, OT   (OP PT only)   Equipment Recommended Bedside commode;Hip Kit ($);Shower/Tub chair with back ($)  (folding walker tray)   Commode Type Standard   Additional Comments  (S)  Pt reports she has shower chair. DME sheet handed in for BSC, hip kit and folding walker tray   Date ordered 05/08/25   Discharge Summary Anticipated DC 5/14/25   OT Therapy Minutes   OT Time In 1230   OT Time Out 1400   OT Total Time (minutes) 90    OT Mode of treatment - Individual (minutes) 90   OT Mode of treatment - Concurrent (minutes) 0   OT Mode of treatment - Group (minutes) 0   OT Mode of treatment - Co-treat (minutes) 0   OT Mode of Treatment - Total time(minutes) 90 minutes   OT Cumulative Minutes 180   Therapy Time missed   Time missed? No

## 2025-05-08 NOTE — PLAN OF CARE
Problem: CARDIOVASCULAR - ADULT  Goal: Maintains optimal cardiac output and hemodynamic stability  Description: INTERVENTIONS:  - Monitor I/O, vital signs and rhythm  - Monitor for S/S and trends of decreased cardiac output  -  Assess quality of pulses, skin color and temperature  - Assess for signs of decreased coronary artery perfusion- Instruct patient to report change in severity of symptoms  Outcome: Progressing     Problem: GENITOURINARY - ADULT  Goal: Maintains or returns to baseline urinary function  Description: INTERVENTIONS:  - Assess urinary function  - Encourage oral fluids to ensure adequate hydration if ordered  - Administer ordered medications as needed  - Offer frequent toileting  - Follow urinary retention protocol if ordered  Outcome: Progressing     Problem: METABOLIC, FLUID AND ELECTROLYTES - ADULT  Goal: Glucose maintained within target range  Description: INTERVENTIONS:  - Monitor Blood Glucose as ordered  - Assess for signs and symptoms of hyperglycemia and hypoglycemia  - Administer ordered medications to maintain glucose within target range  - Assess nutritional intake and initiate nutrition service referral as needed  Outcome: Progressing     Problem: SKIN/TISSUE INTEGRITY - ADULT  Goal: Incision(s), wounds(s) or drain site(s) healing without S/S of infection  Description: INTERVENTIONS  - Assess and document dressing, incision, wound bed, drain sites and surrounding tissue  - Provide patient and family education- Perform skin care/dressing changes every shift.  Outcome: Progressing     Problem: MUSCULOSKELETAL - ADULT  Goal: Maintain or return mobility to safest level of function  Description: INTERVENTIONS:  - Assess patient's ability to carry out ADLs; assess patient's baseline for ADL function and identify physical deficits which impact ability to perform ADLs (bathing, care of mouth/teeth, toileting, grooming, dressing, etc.)  - Assess/evaluate cause of self-care deficits   - Assess  patient's mobility  - Assess patient's need for assistive devices and provide as appropriate- Encourage maximum independence but intervene and supervise when necessary- Involve family in performance of ADLs  - Assess for home care needs following discharge   - Provide patient education as appropriate  Outcome: Progressing

## 2025-05-08 NOTE — PROGRESS NOTES
Progress Note - PMR   Name: Vesna Langston 65 y.o. female I MRN: [id number]  Unit/Bed#: -01 I Date of Admission: 5/6/2025   Date of Service: 5/8/2025 I Hospital Day: 2     Assessment & Plan  Status post lumbar spinal fusion  S/p B/L navigated revision L3-S1 laminectomy/decompression, revision L3-S1 posterior instrumented spinal fusion with TLIF on 05/1/25  WBAT LLE  LSO brace OOB  F/u Dr Mcrae 2 weeks (5/15)  Keep dressing intact and incision dry until 2 week post op visit  DVT ppx: eliquis and plavix  Monitor incision  PT/OT evaluate and treat  Acute pain  Tylenol 650 Q6  Oxycodone 10/5  Pregabalin   Titrate pain meds as needed  Constipation  Scheduled colace and senna  Miralax and Lactulose PRN  Titrate meds as needed  HFrEF (heart failure with reduced ejection fraction) ([facility])  Wt Readings from Last 3 Encounters:   05/08/25 97.5 kg (214 lb 14.4 oz)   05/06/25 98.5 kg (217 lb 2.5 oz)   04/24/25 94.3 kg (208 lb)     LVEF 20% 3/26/24  Home: jardiance 25, Entresto BID, PRN lAsix  Entresto and Jardiance restarted 5/7  Jardiance 10 mg  Restart home meds when able  Daily weights  Sodium restrictive diet  Management per IM  CAD (coronary artery disease)  Continuing toprol, Imdur, and lipitor  S/p MI 4/2024, received PCI  T2DM (type 2 diabetes mellitus) ([facility])  Lab Results   Component Value Date    HGBA1C 5.9 (H) 03/28/2025       Recent Labs     05/07/25  1140 05/07/25  1612 05/07/25  2108 05/08/25  0616   POCGLU 160* 104 128 118       Blood Sugar Average: Last 72 hrs:  (P) 137  Home: jardiance, Ozempic (held in acute due to reflux)  Jardiance restarted 5/7  Management per IM  GERD (gastroesophageal reflux disease)  Continuing protonix  Hypothyroid  Continuing levothyroxine  Atrial flutter, paroxysmal (HCC)  Continuing Torpol, Tikosyn BID, and eliquis  Anemia  Baseline hgb 11-12  5/6 hgb 9.0, 5/8 9.9  Monitor h/h  S/P ICD (internal cardiac defibrillator) procedure  Implanted by Dr Lama 3/27/24 due to scar related VT  and ischemic cardiomyopathy  POP (obstructive sleep apnea)  Continuing home CPAP HS    DVT ppx: Eliquis and plavix    History of Present Illness   Vesna Langston is a 65 y.o. female who presented to the [facility] on 5/1 for elective orthopedic surgery due to lumbar radiculopathy. Pain begain 2 years ago after an injury at work. S/p exploration of fusion mass L3-S1, removal of hardware L3-S1, TLIF TLIF, laminotomy/facetectomy, L5-S1; Arthrodesis, T4-5; Arthrodesis, T3-4; Posterior segmental instrumentation/pedicle screw fixation,L3-S1. Admitted to [facility] 5/6 .     Chief Complaint: f/u ambulatory dysfunction    Interval: Patient seen and examined in recliner this morning. No events overnight.  Reports feeling sore in her back but feels like her pain in well managed. Last BM 5/4. Patient received miralax and prune juice last night and states she will plan to have another today. Passing flatus and normal bowel sounds. Sleeping well.     Review of Systems   Constitutional:  Negative for chills and fever.   Respiratory:  Negative for shortness of breath.    Cardiovascular:  Negative for chest pain, palpitations and leg swelling.   Gastrointestinal:  Positive for constipation (LBM 5/4, passing flatus). Negative for abdominal pain, diarrhea, nausea and vomiting.   Musculoskeletal:  Positive for back pain (describes as sore).   Neurological:  Positive for numbness (LLE below knee chronic).       Objective   Functional Update:  Physical Therapy Occupational Therapy Speech Therapy                           Temp:  [96.3 °F (35.7 °C)-97.3 °F (36.3 °C)] 97.2 °F (36.2 °C)  HR:  [71-82] 82  Resp:  [16-18] 18  BP: ()/(52-65) 106/62  SpO2:  [94 %-96 %] 95 %    Physical Exam  Constitutional:       General: She is not in acute distress.  HENT:      Head: Normocephalic and atraumatic.      Mouth/Throat:      Mouth: Mucous membranes are moist.      Pharynx: Oropharynx is clear.   Cardiovascular:      Rate and  Rhythm: Normal rate and regular rhythm.   Pulmonary:      Effort: Pulmonary effort is normal.      Breath sounds: Normal breath sounds.   Abdominal:      General: Bowel sounds are normal.   Musculoskeletal:         General: Normal range of motion.      Comments: On spinal precautions  Wearing brace   Skin:     General: Skin is warm and dry.   Neurological:      Mental Status: She is alert. Mental status is at baseline.      Sensory: Sensory deficit (LLE below knee diminished sensation) present.      Motor: Weakness (LLE) present.   Psychiatric:         Mood and Affect: Mood normal.         Behavior: Behavior normal.           Scheduled Meds:  Current Facility-Administered Medications   Medication Dose Route Frequency Provider Last Rate    acetaminophen  650 mg Oral Q6H MONA Manisha Saldivar PA-C      aluminum-magnesium hydroxide-simethicone  30 mL Oral Q6H PRN Manisha Saldivar PA-C      apixaban  5 mg Oral Q12H Manisha Saldivar PA-C      atorvastatin  80 mg Oral QPM Manisha Saldivar PA-C      calcium carbonate  1,000 mg Oral Daily PRN Manisha Saldivar PA-C      clopidogrel  75 mg Oral Daily Manisha Saldivar PA-C      docusate sodium  100 mg Oral BID Manisha Saldivar PA-C      dofetilide  125 mcg Oral Q12H MONA Manisha Saldivar PA-C      Empagliflozin  10 mg Oral Daily MAHI Pierce      fluticasone  1 spray Nasal Daily Manisha Saldivar PA-C      insulin lispro  1-5 Units Subcutaneous HS Manisha Saldivar PA-C      insulin lispro  1-6 Units Subcutaneous TID AC Manisha Saldivar PA-C      isosorbide mononitrate  30 mg Oral BID Manisha Saldivar PA-C      lactulose  30 g Oral Daily PRN Manisha Saldivar PA-C      levothyroxine  75 mcg Oral Daily Manisha Saldivar PA-C      metoprolol succinate  50 mg Oral HS Manisha Saldivar PA-C      ondansetron  4 mg Oral Q6H PRN Manisha Saldivar PA-C      oxyCODONE  10 mg Oral Q4H PRN Manisha Saldivar PA-C       oxyCODONE  5 mg Oral Q4H PRN Manisha Saldivar PA-C      pantoprazole  40 mg Oral BID AC Manisha Saldivar PA-C      polyethylene glycol  17 g Oral Daily PRN Manisha Saldivar PA-C      pregabalin  75 mg Oral TID Manisha Saldivar PA-C      sacubitril-valsartan  1 tablet Oral BID Aldoemmy Rudolphth TalaveraMAHI marino      senna  1 tablet Oral Daily Manisha Saldivar PA-C           Lab Results: I have reviewed the following results:  Results from last 7 days   Lab Units 05/08/25  0609 05/06/25  0534 05/05/25  0434   HEMOGLOBIN g/dL 9.9* 9.0* 9.4*   HEMATOCRIT % 32.6* 28.5* 30.5*   WBC Thousand/uL 8.43 7.92 8.39   PLATELETS Thousands/uL 286 224 211     Results from last 7 days   Lab Units 05/08/25  0609 05/07/25  0602 05/06/25  0534   BUN mg/dL 10 11 9   SODIUM mmol/L 138 139 138   POTASSIUM mmol/L 4.0 4.1 3.9   CHLORIDE mmol/L 102 101 101   CREATININE mg/dL 0.78 0.81 0.68              Manisha Saldivar PA-C  Physical Medicine and Rehabilitation   05/08/25

## 2025-05-08 NOTE — PLAN OF CARE
Problem: RESPIRATORY - ADULT  Goal: Achieves optimal ventilation and oxygenation  Description: INTERVENTIONS:  - Assess for changes in respiratory status  - Assess for changes in mentation and behavior  - Position to facilitate oxygenation and minimize respiratory effort  - Oxygen administered by appropriate delivery if ordered  - Encourage broncho-pulmonary hygiene including cough, deep breathe, Incentive Spirometry  - Assess the need for suctioning and aspirate as needed  - Assess and instruct to report SOB or any respiratory difficulty  Outcome: Progressing     Problem: GENITOURINARY - ADULT  Goal: Maintains or returns to baseline urinary function  Description: INTERVENTIONS:  - Assess urinary function  - Encourage oral fluids to ensure adequate hydration if ordered  - Administer ordered medications as needed  - Offer frequent toileting  - Follow urinary retention protocol if ordered  Outcome: Progressing

## 2025-05-08 NOTE — ASSESSMENT & PLAN NOTE
Hgb currently 9.0.  Hgb 13.5 pre-operatively.  Likely acute blood loss anemia.  Iron panel pending.  Continue to trend routine CBC.

## 2025-05-08 NOTE — CASE MANAGEMENT
Initial eval for therapy and physicianprogress notes manually faxed to [employer] [employer]  Jacqueline Vega and RN ROSANNE Suarez [place] to [phone]

## 2025-05-08 NOTE — PROGRESS NOTES
05/08/25 0830   Pain Assessment   Pain Assessment Tool 0-10   Pain Score 4   Pain Location/Orientation Orientation: Lower;Location: Back   Patient's Stated Pain Goal No pain   Hospital Pain Intervention(s) Medication (See MAR)  (Pt premedicated 30 minutes ago for pain.)   Restrictions/Precautions   Precautions Fall Risk;Spinal precautions   Braces or Orthoses (S)  LSO   Cognition   Overall Cognitive Status WFL   Arousal/Participation Alert;Cooperative   Attention Within functional limits   Orientation Level Oriented X4   Memory Within functional limits   Following Commands Follows all commands and directions without difficulty   Subjective   Subjective Pt seated in bedside chair.  Ready for PT.   Sit to Stand   Type of Assistance Needed Supervision   Physical Assistance Level No physical assistance   Comment CS w RW   Sit to Stand CARE Score 4   Bed-Chair Transfer   Type of Assistance Needed Supervision   Physical Assistance Level No physical assistance   Comment CS STP w RW   Chair/Bed-to-Chair Transfer CARE Score 4   Walk 10 Feet   Type of Assistance Needed Supervision   Comment RW   Walk 10 Feet CARE Score 4   Walk 50 Feet with Two Turns   Type of Assistance Needed Supervision   Comment RW   Walk 50 Feet with Two Turns CARE Score 4   Walk 150 Feet   Type of Assistance Needed Supervision   Comment RW   Walk 150 Feet CARE Score 4   Ambulation   Primary Mode of Locomotion Prior to Admission Walk   Distance Walked (feet) 275 ft  (x2 + 100)   Assist Device Roller Walker   Gait Pattern Inconsistant Meka;Slow Meka;L foot drag;Step through   Limitations Noted In Endurance;Heel Strike;Speed;Strength;Swing   Walk Assist Level Close Supervision   Does the patient walk? 2. Yes   Wheel 50 Feet with Two Turns   Reason if not Attempted Activity not applicable   Wheel 50 Feet with Two Turns CARE Score 9   Wheel 150 Feet   Reason if not Attempted Activity not applicable   Wheel 150 Feet CARE Score 9   Wheelchair mobility  Problem: Patient Care Overview  Goal: Plan of Care Review  Outcome: Ongoing (interventions implemented as appropriate)  Resting quietly during two hour rounds. Antibiotic therapy in progress. In isolation for possible C-diff has had no BM since Sat AM. Denies pain & discomfort. No falls or trauma this shift.Pt. Verbalizes understanding of their plan of care.       "  Findings Pt anticipated  to be ambulatory at [place]   Does the patient use a wheelchair? 0. No   4 Steps   Type of Assistance Needed Incidental touching   Comment R HR + SPC.  Cues for sequencing.  Completed 2 trials wiht break in between.   4 Steps CARE Score 4   12 Steps   Reason if not Attempted Safety concerns   12 Steps CARE Score 88   Stairs   Type Stairs   # of Steps 4  (x2)   Weight Bearing Precautions Back;Fall Risk   Assist Devices Single Rail   Findings Trialed stairs again today, as pt states \"they are my nemesis.\"  Pt able to utilize Step  to pattern leading w R LE.   Therapeutic Interventions   Strengthening Seated TE: LAQ x30, HR; TR with red theraband over dorum of foot; ball squeezes; seated HS curl red tb x30   Equipment Use   NuStep L1 x10 min LE's only.   Assessment   Treatment Assessment Engaged in 90 minute skilled PT session focused on strengthening, endurace training, longer distance ambulation, and stair training.  Educated pt on the importance of taking tings slow.  Pt is very motivated and wants to continue to push herself.  Educated her that during this phase of recovery it is important to take things slowly and not  overdue activity, and that PT is mainly to work on functional tasks.  Pt in agreement.  Cont PT POC with focus on stair training, and light LE strengthening.   Problem List Decreased strength;Decreased endurance;Decreased mobility;Pain;Orthopedic restrictions   Barriers to Discharge Inaccessible home environment;Decreased caregiver support   Barriers to Discharge Comments (S)  Pt is agreeble to stay with her daugher in [place] if needed at [place].   Plan   Treatment/Interventions Functional transfer training;LE strengthening/ROM;Elevations;Therapeutic exercise;Endurance training   Discharge Recommendation   Discharge Summary (S)  DC anticipated for 5/14/25, plans to DC to daughters home to be closer for f/u appointment with surgeon, so daughter does not have to drive back and " forth too much. Plan to await surgeon input for followup therapies.   PT Therapy Minutes   PT Time In 0830   PT Time Out 1000   PT Total Time (minutes) 90   PT Mode of treatment - Individual (minutes) 90   PT Mode of treatment - Concurrent (minutes) 0   PT Mode of treatment - Group (minutes) 0   PT Mode of treatment - Co-treat (minutes) 0   PT Mode of Treatment - Total time(minutes) 90 minutes   PT Cumulative Minutes 180   Therapy Time missed   Time missed? No

## 2025-05-08 NOTE — ASSESSMENT & PLAN NOTE
Wt Readings from Last 3 Encounters:   05/08/25 97.5 kg (214 lb 14.4 oz)   05/06/25 98.5 kg (217 lb 2.5 oz)   04/24/25 94.3 kg (208 lb)     LVEF 20% 3/26/24  Home: jardiance 25, Entresto BID, PRN lAsix  Entresto and Jardiance restarted 5/7  Jardiance 10 mg  Restart home meds when able  Daily weights  Sodium restrictive diet  Management per IM

## 2025-05-08 NOTE — ASSESSMENT & PLAN NOTE
Lab Results   Component Value Date    HGBA1C 5.9 (H) 03/28/2025       Recent Labs     05/07/25  1140 05/07/25  1612 05/07/25  2108 05/08/25  0616   POCGLU 160* 104 128 118       Blood Sugar Average: Last 72 hrs:  (P) 137    Last A1c 5.9 on 3/28/24.  Home regimen: Jardiance 25mg daily and Ozempic 1mg/weekly.  Continue Jardiance 10mg daily (25mg not on formulary).  Resume Ozempic on discharge.  Continue cons. carb diet, QID accuchecks, and SSI.

## 2025-05-08 NOTE — ASSESSMENT & PLAN NOTE
Wt Readings from Last 3 Encounters:   05/08/25 97.5 kg (214 lb 14.4 oz)   05/06/25 98.5 kg (217 lb 2.5 oz)   04/24/25 94.3 kg (208 lb)     Last ECHO on 10/8/24: EF 40-45%, systolic function moderately reduced, and diastolic function abnormal.  Continue home Toprol XL 50mg at HS.    Continue Jardiance and Entresto.    Had been on Lasix 40mg PRN at home.  Monitor I&Os and daily weights.  Follows with Cardiology (Dr. Bell) as outpatient.

## 2025-05-08 NOTE — TREATMENT PLAN
Individualized Plan of Care - [facility]  Vesna Langston 65 y.o. female MRN: [id number]  Unit/Bed#: -01 Encounter: [id number]     PATIENT INFORMATION  ADMISSION DATE: 5/6/2025  4:34 PM DYLON CATEGORY:03.9  Other Neurologic Disorder     ADMISSION DIAGNOSIS: Lumbar radiculopathy [M54.16]  EXPECTED LOS: 7-10 days     MEDICAL/FUNCTIONAL PROGNOSIS  Based on my assessment of the patient's medical conditions and current functional status, the prognosis for attaining medical and functional goals or the IRF stay is:  Excellent    Medical Goals: Patient will be medically stable for discharge to [facility] upon completion of rehab program and Patient will be able to manage medical conditions and comorbid conditions with medications and follow up upon completion of rehab program    ANTICIPATED DISCHARGE DISPOSITION AND SERVICES  COMMUNITY SETTING: Home - alone    Is a 24-hr caregiver available? No  Has discharge plan been discussed with primary caregiver?  No    ANTICIPATED FOLLOW-UP SERVICE:   Outpatient Therapy Services: PT         DISCIPLINE SPECIFIC PLANS:  Required Disciplines & Services: Rehabillitation Nursing, Case Management, and Dietay/Nutrition    REQUIRED THERAPY:  Therapy Hours per Day Days per Week Total Days   Physical Therapy 1.5 5-6 7-10   Occupational Therapy 1.5 5-6 7-10   NOTE: Additional therapy time(s) may be added as appropriate to meet patient needs and to achieve functional goals.    ANTICIPATED FUNCTIONAL OUTCOMES:  ADL: Patient will be independent with ADLs with least restrictive device upon completion of rehab program   Bladder/Bowel: Patient will return to premorbid level for bladder/bowel management upon completion of rehab program   Transfers: Patient will be independent with transfers with least restrictive device upon completion of rehab program   Locomotion: Patient will be independent with locomotion with least restrictive device upon completion of rehab  program   Cognitive:  Premorbid level (modified independent)     DISCHARGE PLANNING NEEDS  Equipment needs: Discharge needs to be reviewed with team    REHAB ANTICIPATED PARTICIPATION RESTRICTIONS:  Lumbar spine precautions  LSO brace when OOB    Hebert Aldrich MD  Attending Physician  Physical Medicine and Rehabilitation  [facility]

## 2025-05-08 NOTE — PROGRESS NOTES
Progress Note - Internal Medicine   Name: Vesna Langston 65 y.o. female I MRN: [id number]  Unit/Bed#: -01 I Date of Admission: 5/6/2025   Date of Service: 5/8/2025 I Hospital Day: 2    Assessment & Plan  Status post lumbar spinal fusion  Hx of L3-S1 fusion in 2023 due to work related injury.  Had been experiencing worsening LLE radiculopathy/lower back pain/ambulatory dysfunction.  MRI lumbar spine on 1/28/25 showed multilevel lumbar spondylosis without canal stenosis but there is mild L subarticular zone narrowing and mild bilateral foraminal stenosis at L3-L4 due to L sided disc protrusion at this level.    OR on 5/1 for elective bilateral navigated revision of L3-S1 laminectomy/decompression and revision of L3-S1 posterior instrumented spinal fusion and TLIF with Dr. Mcrae (Ortho/Spine).    LSO brace when OOB.  Neurovascular checks Q shift.  Ensure adequate pain control.  Monitor incision for s/s of infection.    Follow-up with Ortho/Spine as outpatient.    Primary team following.  PT/OT.  Atrial flutter, paroxysmal (HCC)  S/p pacemaker.  Continue home Toprol XL 50mg at HS and Tikosyn 125mcg BID for rate control.  Continue Eliquis 5mg BID for anticoagulation.  Monitor routine VS.  Replete electrolytes as needed.  Follows with Dr. Bell (Cardiology) as outpatient.  HFrEF (heart failure with reduced ejection fraction) ([facility])  Wt Readings from Last 3 Encounters:   05/08/25 97.5 kg (214 lb 14.4 oz)   05/06/25 98.5 kg (217 lb 2.5 oz)   04/24/25 94.3 kg (208 lb)     Last ECHO on 10/8/24: EF 40-45%, systolic function moderately reduced, and diastolic function abnormal.  Continue home Toprol XL 50mg at HS.    Continue Jardiance and Entresto.    Had been on Lasix 40mg PRN at home.  Monitor I&Os and daily weights.  Follows with Cardiology (Dr. Bell) as outpatient.  S/P ICD (internal cardiac defibrillator) procedure  Implanted in 03/2024 due to scar related VT and ischemic cardiomyopathy.  Follow-up with EP as needed as  "outpatient.  CAD (coronary artery disease)  S/p stent in 03/2024.  Continue home Imdur 30mg BID, Toprol XL 50mg at HS, Lipitor 80mg daily, and Plavix 75mg daily.  Follows with Dr. Bell (Cardiology) as outpatient.  POP (obstructive sleep apnea)  Continue home CPAP at HS.  Follows with Sleep Medicine as outpatient.  Other pulmonary embolism without acute cor pulmonale, unspecified chronicity ([facility])  Hx of PE s/p appendectomy in 05/2024.  Continue home Eliquis 5mg BID.  T2DM (type 2 diabetes mellitus) ([facility])  Lab Results   Component Value Date    HGBA1C 5.9 (H) 03/28/2025       Recent Labs     05/07/25  1140 05/07/25  1612 05/07/25  2108 05/08/25  0616   POCGLU 160* 104 128 118       Blood Sugar Average: Last 72 hrs:  (P) 137    Last A1c 5.9 on 3/28/24.  Home regimen: Jardiance 25mg daily and Ozempic 1mg/weekly.  Continue Jardiance 10mg daily (25mg not on formulary).  Resume Ozempic on discharge.  Continue cons. carb diet, QID accuchecks, and SSI.  Anemia  Hgb currently 9.0.  Hgb 13.5 pre-operatively.  Likely acute blood loss anemia.  Iron panel pending.  Continue to trend routine CBC.  GERD (gastroesophageal reflux disease)  Stable.  Continue home Protonix 40mg daily.  Follows with Kassi High PA-C (GI) as outpatient.  Hypothyroid  Continue home levothyroxine 75mcg daily.  Last TSH 3.749 on 4/10.    VTE Pharmacologic Prophylaxis:   Pharmacologic: Apixaban (Eliquis)  Mechanical VTE Prophylaxis in Place: Yes - sequential compression devices.    Current Length of Stay: 2 day(s)    Current Patient Status: Inpatient Rehab     Discharge Plan: As per primary team.    Code Status: Level 1 - Full Code    Subjective:   Pt examined while pt sitting in in recliner in pt room.  Currently states that her back feels \"sore.\"  Had just completed PT and felt that getting up and ambulating improved her pain.  Has chronic numbness to the LLE.  Denies any fevers, chills, lightheadedness, dizziness, SOB, or palpitations.  LBM was on 5/4.  " "Denies any nausea or abdominal pain.  Has been passing gas.  States that she will drink prune juice later today.    Objective:     Vitals:   Temp (24hrs), Av.9 °F (36.1 °C), Min:96.3 °F (35.7 °C), Max:97.3 °F (36.3 °C)    Temp:  [96.3 °F (35.7 °C)-97.3 °F (36.3 °C)] 97.2 °F (36.2 °C)  HR:  [71-82] 82  Resp:  [16-18] 18  BP: ()/(52-65) 106/62  SpO2:  [94 %-96 %] 95 %  Body mass index is 32.68 kg/m².     Review of Systems   Constitutional:  Negative for appetite change, chills, fatigue and fever.   HENT:  Negative for trouble swallowing.    Eyes:  Negative for visual disturbance.   Respiratory:  Negative for cough, shortness of breath, wheezing and stridor.    Cardiovascular:  Negative for chest pain, palpitations and leg swelling.   Gastrointestinal:  Positive for constipation. Negative for abdominal distention, abdominal pain, diarrhea, nausea and vomiting.        LBM 5/4, passing gas   Genitourinary:  Negative for difficulty urinating.   Musculoskeletal:  Positive for back pain (mid back pain, currently feels \"sore,\" just complete session of PT). Negative for arthralgias and gait problem.   Neurological:  Negative for dizziness, weakness, light-headedness, numbness and headaches.   Psychiatric/Behavioral:  Negative for dysphoric mood and sleep disturbance. The patient is not nervous/anxious.    All other systems reviewed and are negative.       Input and Output Summary (last 24 hours):       Intake/Output Summary (Last 24 hours) at 2025 0913  Last data filed at 2025 0817  Gross per 24 hour   Intake 300 ml   Output 130 ml   Net 170 ml       Physical Exam:     Physical Exam  Vitals and nursing note reviewed.   Constitutional:       General: She is not in acute distress.     Appearance: Normal appearance. She is obese. She is not ill-appearing.   HENT:      Head: Normocephalic and atraumatic.   Cardiovascular:      Rate and Rhythm: Normal rate and regular rhythm.      Pulses: Normal pulses.      " Heart sounds: Normal heart sounds. No murmur heard.     No friction rub.   Pulmonary:      Effort: Pulmonary effort is normal. No respiratory distress.      Breath sounds: Examination of the right-lower field reveals rales. Examination of the left-lower field reveals rales. Rales present. No wheezing or rhonchi.   Abdominal:      General: Abdomen is flat. Bowel sounds are normal. There is no distension.      Palpations: Abdomen is soft. There is no mass.      Tenderness: There is no abdominal tenderness. There is no guarding or rebound.      Hernia: No hernia is present.   Musculoskeletal:      Cervical back: Normal range of motion and neck supple. No tenderness.      Right lower leg: Edema (Trace non-pitting edema) present.      Left lower leg: Edema (Trace non-pitting edema) present.      Comments: Wearing LSO brace.   Skin:     General: Skin is warm and dry.      Capillary Refill: Capillary refill takes less than 2 seconds.   Neurological:      Mental Status: She is alert and oriented to person, place, and time.   Psychiatric:         Mood and Affect: Mood normal.         Behavior: Behavior normal.         Additional Data:     Labs:    Results from last 7 days   Lab Units 05/08/25  0609   WBC Thousand/uL 8.43   HEMOGLOBIN g/dL 9.9*   HEMATOCRIT % 32.6*   PLATELETS Thousands/uL 286   SEGS PCT % 56   LYMPHO PCT % 27   MONO PCT % 11   EOS PCT % 5     Results from last 7 days   Lab Units 05/08/25  0609   SODIUM mmol/L 138   POTASSIUM mmol/L 4.0   CHLORIDE mmol/L 102   CO2 mmol/L 28   BUN mg/dL 10   CREATININE mg/dL 0.78   ANION GAP mmol/L 8   CALCIUM mg/dL 9.2   GLUCOSE RANDOM mg/dL 112         Results from last 7 days   Lab Units 05/08/25  0616 05/07/25  2108 05/07/25  1612 05/07/25  1140 05/07/25  0557 05/06/25  2040 05/06/25  1656 05/06/25  1211 05/06/25  0809 05/05/25  2141 05/05/25  1658 05/05/25  1128   POC GLUCOSE mg/dl 118 128 104 160* 120 171* 158* 144* 112 158* 129 167*               Labs  reviewed    Imaging:    Imaging reviewed    Recent Cultures (last 7 days):           Last 24 Hours Medication List:   Current Facility-Administered Medications   Medication Dose Route Frequency Provider Last Rate    acetaminophen  650 mg Oral Q6H MONA Manisha Saldivar, EVON      aluminum-magnesium hydroxide-simethicone  30 mL Oral Q6H PRN Manisha Saldivar, EVON      apixaban  5 mg Oral Q12H Manisha Saldivar, PA-C      atorvastatin  80 mg Oral QPM Manisha Saldivar, EVON      calcium carbonate  1,000 mg Oral Daily PRN Manisha Saldivar, PA-C      clopidogrel  75 mg Oral Daily Manisha Sadlivar, PA-C      docusate sodium  100 mg Oral BID Manisha Saldivar, EVON      dofetilide  125 mcg Oral Q12H MONA Manisha Saldivar, EVON      Empagliflozin  10 mg Oral Daily Aldo Talavera, MAHI      fluticasone  1 spray Nasal Daily Manisha Saldivar, EVON      insulin lispro  1-5 Units Subcutaneous HS Manisha Saldivar, EVON      insulin lispro  1-6 Units Subcutaneous TID AC Manisha Saldivar, EVON      isosorbide mononitrate  30 mg Oral BID Manisha Saldivar, PA-C      lactulose  30 g Oral Daily PRN Manisha Saldivar, EVON      levothyroxine  75 mcg Oral Daily Manisha Saldivar, EVON      metoprolol succinate  50 mg Oral HS Manisha Saldivar, EVON      ondansetron  4 mg Oral Q6H PRN Manisha Saldivar, EVON      oxyCODONE  10 mg Oral Q4H PRN Manisha Saldivar, EVON      oxyCODONE  5 mg Oral Q4H PRN Manisha Saldivar, PA-C      pantoprazole  40 mg Oral BID AC Manisha Saldivar, EVON      polyethylene glycol  17 g Oral Daily PRN Manisha Saldivar, EVON      pregabalin  75 mg Oral TID Manisha Saldivar PA-C      sacubitril-valsartan  1 tablet Oral BID Aldo Talavera, IFEANYINP      senna  1 tablet Oral Daily Manisha Saldivar, EVON          M*Modal software was used to dictate this note.  It may contain errors with dictating incorrect words or incorrect spelling. Please contact  the provider directly with any questions.

## 2025-05-08 NOTE — ASSESSMENT & PLAN NOTE
Lab Results   Component Value Date    HGBA1C 5.9 (H) 03/28/2025       Recent Labs     05/07/25  1140 05/07/25  1612 05/07/25  2108 05/08/25  0616   POCGLU 160* 104 128 118       Blood Sugar Average: Last 72 hrs:  (P) 137  Home: jardiance, Ozempic (held in acute due to reflux)  Jardiance restarted 5/7  Management per IM

## 2025-05-09 PROBLEM — E55.9 VITAMIN D DEFICIENCY: Status: ACTIVE | Noted: 2025-05-09

## 2025-05-09 LAB
GLUCOSE SERPL-MCNC: 111 MG/DL (ref 65–140)
GLUCOSE SERPL-MCNC: 117 MG/DL (ref 65–140)
GLUCOSE SERPL-MCNC: 138 MG/DL (ref 65–140)
GLUCOSE SERPL-MCNC: 159 MG/DL (ref 65–140)

## 2025-05-09 PROCEDURE — 99232 SBSQ HOSP IP/OBS MODERATE 35: CPT | Performed by: INTERNAL MEDICINE

## 2025-05-09 PROCEDURE — 97530 THERAPEUTIC ACTIVITIES: CPT

## 2025-05-09 PROCEDURE — 97116 GAIT TRAINING THERAPY: CPT

## 2025-05-09 PROCEDURE — 97110 THERAPEUTIC EXERCISES: CPT

## 2025-05-09 PROCEDURE — 82948 REAGENT STRIP/BLOOD GLUCOSE: CPT

## 2025-05-09 PROCEDURE — 97535 SELF CARE MNGMENT TRAINING: CPT

## 2025-05-09 RX ORDER — BISACODYL 10 MG
10 SUPPOSITORY, RECTAL RECTAL DAILY PRN
Status: DISCONTINUED | OUTPATIENT
Start: 2025-05-09 | End: 2025-05-14 | Stop reason: HOSPADM

## 2025-05-09 RX ORDER — SENNOSIDES 8.6 MG
2 TABLET ORAL DAILY
Status: DISCONTINUED | OUTPATIENT
Start: 2025-05-10 | End: 2025-05-14 | Stop reason: HOSPADM

## 2025-05-09 RX ORDER — BISACODYL 10 MG
10 SUPPOSITORY, RECTAL RECTAL DAILY PRN
Status: DISCONTINUED | OUTPATIENT
Start: 2025-05-09 | End: 2025-05-09

## 2025-05-09 RX ADMIN — SACUBITRIL AND VALSARTAN 1 TABLET: 24; 26 TABLET, FILM COATED ORAL at 17:28

## 2025-05-09 RX ADMIN — OXYCODONE HYDROCHLORIDE 5 MG: 5 TABLET ORAL at 06:20

## 2025-05-09 RX ADMIN — FLUTICASONE PROPIONATE 1 SPRAY: 50 SPRAY, METERED NASAL at 09:04

## 2025-05-09 RX ADMIN — EMPAGLIFLOZIN 10 MG: 10 TABLET, FILM COATED ORAL at 09:07

## 2025-05-09 RX ADMIN — ACETAMINOPHEN 650 MG: 325 TABLET, FILM COATED ORAL at 12:16

## 2025-05-09 RX ADMIN — PREGABALIN 75 MG: 75 CAPSULE ORAL at 21:19

## 2025-05-09 RX ADMIN — INSULIN LISPRO 1 UNITS: 100 INJECTION, SOLUTION INTRAVENOUS; SUBCUTANEOUS at 12:17

## 2025-05-09 RX ADMIN — SENNOSIDES 8.6 MG: 8.6 TABLET, FILM COATED ORAL at 09:07

## 2025-05-09 RX ADMIN — DOCUSATE SODIUM 100 MG: 100 CAPSULE, LIQUID FILLED ORAL at 17:28

## 2025-05-09 RX ADMIN — CLOPIDOGREL BISULFATE 75 MG: 75 TABLET, FILM COATED ORAL at 09:07

## 2025-05-09 RX ADMIN — DOFETILIDE 125 MCG: 0.12 CAPSULE ORAL at 21:19

## 2025-05-09 RX ADMIN — POLYETHYLENE GLYCOL 3350 17 G: 17 POWDER, FOR SOLUTION ORAL at 09:00

## 2025-05-09 RX ADMIN — LEVOTHYROXINE SODIUM 75 MCG: 75 TABLET ORAL at 06:20

## 2025-05-09 RX ADMIN — DOFETILIDE 125 MCG: 0.12 CAPSULE ORAL at 09:07

## 2025-05-09 RX ADMIN — PANTOPRAZOLE SODIUM 40 MG: 40 TABLET, DELAYED RELEASE ORAL at 06:20

## 2025-05-09 RX ADMIN — DOCUSATE SODIUM 100 MG: 100 CAPSULE, LIQUID FILLED ORAL at 09:07

## 2025-05-09 RX ADMIN — LACTULOSE 30 G: 20 SOLUTION ORAL at 21:19

## 2025-05-09 RX ADMIN — PREGABALIN 75 MG: 75 CAPSULE ORAL at 17:29

## 2025-05-09 RX ADMIN — APIXABAN 5 MG: 5 TABLET, FILM COATED ORAL at 09:07

## 2025-05-09 RX ADMIN — OXYCODONE HYDROCHLORIDE 5 MG: 5 TABLET ORAL at 21:19

## 2025-05-09 RX ADMIN — ACETAMINOPHEN 650 MG: 325 TABLET, FILM COATED ORAL at 17:28

## 2025-05-09 RX ADMIN — OXYCODONE HYDROCHLORIDE 5 MG: 5 TABLET ORAL at 12:17

## 2025-05-09 RX ADMIN — SACUBITRIL AND VALSARTAN 1 TABLET: 24; 26 TABLET, FILM COATED ORAL at 09:07

## 2025-05-09 RX ADMIN — ACETAMINOPHEN 650 MG: 325 TABLET, FILM COATED ORAL at 06:20

## 2025-05-09 RX ADMIN — PANTOPRAZOLE SODIUM 40 MG: 40 TABLET, DELAYED RELEASE ORAL at 17:27

## 2025-05-09 RX ADMIN — APIXABAN 5 MG: 5 TABLET, FILM COATED ORAL at 21:19

## 2025-05-09 RX ADMIN — ATORVASTATIN CALCIUM 80 MG: 80 TABLET, FILM COATED ORAL at 17:29

## 2025-05-09 RX ADMIN — ISOSORBIDE MONONITRATE 30 MG: 30 TABLET, EXTENDED RELEASE ORAL at 09:07

## 2025-05-09 RX ADMIN — PREGABALIN 75 MG: 75 CAPSULE ORAL at 09:07

## 2025-05-09 NOTE — ASSESSMENT & PLAN NOTE
Vitamin D level 19.9 on 5/8.  Started on Vitamin D 50,000u weekly.  Continue for at least 8 weeks.  Follow-up with PCP on discharge.

## 2025-05-09 NOTE — ASSESSMENT & PLAN NOTE
Lab Results   Component Value Date    HGBA1C 5.9 (H) 03/28/2025       Recent Labs     05/08/25  1135 05/08/25  1637 05/08/25 2040 05/09/25  0617   POCGLU 124 106 121 117       Blood Sugar Average: Last 72 hrs:  (P) 129.8821107725024159  Home: jardiiain, Ozempic (held in acute due to reflux)  Jardiance restarted 5/7  Management per IM

## 2025-05-09 NOTE — PLAN OF CARE
Problem: NEUROSENSORY - ADULT  Goal: Achieves stable or improved neurological status  Description: INTERVENTIONS  - Monitor and report changes in neurological status  - Monitor vital signs such as temperature, blood pressure, glucose, and any other labs ordered     Outcome: Progressing     Problem: RESPIRATORY - ADULT  Goal: Achieves optimal ventilation and oxygenation  Description: INTERVENTIONS:  - Assess for changes in respiratory status  - Assess for changes in mentation and behavior  - Position to facilitate oxygenation and minimize respiratory effort  - Oxygen administered by appropriate delivery if ordered  - Encourage broncho-pulmonary hygiene including cough, deep breathe, Incentive Spirometry  - Assess the need for suctioning and aspirate as needed  - Assess and instruct to report SOB or any respiratory difficulty  Outcome: Progressing     Problem: GENITOURINARY - ADULT  Goal: Maintains or returns to baseline urinary function  Description: INTERVENTIONS:  - Assess urinary function  - Encourage oral fluids to ensure adequate hydration if ordered  - Administer ordered medications as needed  - Offer frequent toileting  - Follow urinary retention protocol if ordered  Outcome: Progressing

## 2025-05-09 NOTE — CASE MANAGEMENT
Case Management    Called Jacqu[facility] V[phone] for [facility]. Voicemail left. Also called Daniela Amado at [phone] and left voicemail as well. Awaiting responses.      Harriet Alvarado

## 2025-05-09 NOTE — ASSESSMENT & PLAN NOTE
Wt Readings from Last 3 Encounters:   05/09/25 97.7 kg (215 lb 5 oz)   05/06/25 98.5 kg (217 lb 2.5 oz)   04/24/25 94.3 kg (208 lb)     LVEF 20% 3/26/24  Home: jardiance 25, Entresto BID, PRN lAsix  Entresto and Jardiance restarted 5/7  Jardiance 10 mg  Restart home meds when able  Daily weights  Sodium restrictive diet  Management per IM

## 2025-05-09 NOTE — CASE MANAGEMENT
Case Management    Spoke to Marisol ([phone] ext [phone]) from [employer] a third party vendor who is setting up transportation for patient on 5/16. Requested 12:30pm transport time. She will reach back out to provide information on name and contact information for company. Staff will need to bring patient down to lobby at 12:15 for transport at 12:30pm.

## 2025-05-09 NOTE — ASSESSMENT & PLAN NOTE
Wt Readings from Last 3 Encounters:   05/09/25 97.7 kg (215 lb 5 oz)   05/06/25 98.5 kg (217 lb 2.5 oz)   04/24/25 94.3 kg (208 lb)     Last ECHO on 10/8/24: EF 40-45%, systolic function moderately reduced, and diastolic function abnormal.  Continue home Toprol XL 50mg at HS.    Continue Jardiance and Entresto.    Had been on Lasix 40mg PRN at home.  Monitor I&Os and daily weights.  Follows with Cardiology (Dr. Bell) as outpatient.

## 2025-05-09 NOTE — PLAN OF CARE
Problem: GASTROINTESTINAL - ADULT  Goal: Maintains or returns to baseline bowel function  Description: INTERVENTIONS:  - Assess bowel function  - Encourage oral fluids to ensure adequate hydration  - Administer ordered medications as needed  - Encourage mobilization and activity  - Consider nutritional services referral to assist patient with adequate nutrition and appropriate food choices  Outcome: Progressing     Problem: SKIN/TISSUE INTEGRITY - ADULT  Goal: Incision(s), wounds(s) or drain site(s) healing without S/S of infection  Description: INTERVENTIONS  - Assess and document dressing, incision, wound bed, drain sites and surrounding tissue  - Provide patient and family education- Perform skin care/dressing changes every shift.  Outcome: Progressing

## 2025-05-09 NOTE — ASSESSMENT & PLAN NOTE
Continuing [place], Tikosyn BID, and eliquis   Progress Note    Pod 7    S: positive flatus,  positive BM. Getting up with PT. Pain controlled with tylenol    O:  Temp:  [98.1 °F (36.7 °C)-98.7 °F (37.1 °C)] 98.6 °F (37 °C)  Heart Rate:  [76-93] 77  Resp:  [18] 18  BP: (133-151)/(71-79) 133/71      Intake/Output Summary (Last 24 hours) at 2/10/2021 1831  Last data filed at 2/10/2021 1328  Gross per 24 hour   Intake 360 ml   Output --   Net 360 ml       Abd:   soft, non distended  Incision: clean, dry      Results from last 7 days   Lab Units 02/10/21  0528   WBC 10*3/mm3 6.93   HEMOGLOBIN g/dL 9.2*   HEMATOCRIT % 30.5*   PLATELETS 10*3/mm3 138*     Results from last 7 days   Lab Units 02/10/21  0528   SODIUM mmol/L 141   POTASSIUM mmol/L 3.6   CHLORIDE mmol/L 102   CO2 mmol/L 31.8*   BUN mg/dL 22   CREATININE mg/dL 0.97   GLUCOSE mg/dL 156*   CALCIUM mg/dL 8.4*       Results from last 7 days   Lab Units 02/09/21  0437   MAGNESIUM mg/dL 1.7         A/P: 81 y.o. male with s/p robotic right colectomy  Tolerating reg diet  Ok to d/c

## 2025-05-09 NOTE — PROGRESS NOTES
Progress Note - Internal Medicine   Name: Vesna Langston 65 y.o. female I MRN: [id number]  Unit/Bed#: -01 I Date of Admission: 5/6/2025   Date of Service: 5/9/2025 I Hospital Day: 3    Assessment & Plan  Status post lumbar spinal fusion  Hx of L3-S1 fusion in 2023 due to work related injury.  Had been experiencing worsening LLE radiculopathy/lower back pain/ambulatory dysfunction.  MRI lumbar spine on 1/28/25 showed multilevel lumbar spondylosis without canal stenosis but there is mild L subarticular zone narrowing and mild bilateral foraminal stenosis at L3-L4 due to L sided disc protrusion at this level.    OR on 5/1 for elective bilateral navigated revision of L3-S1 laminectomy/decompression and revision of L3-S1 posterior instrumented spinal fusion and TLIF with Dr. Mcrae (Ortho/Spine).    LSO brace when OOB.  Neurovascular checks Q shift.  Ensure adequate pain control.  Monitor incision for s/s of infection.    Follow-up with Ortho/Spine as outpatient.    Primary team following.  PT/OT.  Atrial flutter, paroxysmal (HCC)  S/p pacemaker.  Continue home Toprol XL 50mg at HS and Tikosyn 125mcg BID for rate control.  Continue Eliquis 5mg BID for anticoagulation.  Monitor routine VS.  Replete electrolytes as needed.  Follows with Dr. Bell (Cardiology) as outpatient.  HFrEF (heart failure with reduced ejection fraction) ([facility])  Wt Readings from Last 3 Encounters:   05/09/25 97.7 kg (215 lb 5 oz)   05/06/25 98.5 kg (217 lb 2.5 oz)   04/24/25 94.3 kg (208 lb)     Last ECHO on 10/8/24: EF 40-45%, systolic function moderately reduced, and diastolic function abnormal.  Continue home Toprol XL 50mg at HS.    Continue Jardiance and Entresto.    Had been on Lasix 40mg PRN at home.  Monitor I&Os and daily weights.  Follows with Cardiology (Dr. Bell) as outpatient.  S/P ICD (internal cardiac defibrillator) procedure  Implanted in 03/2024 due to scar related VT and ischemic cardiomyopathy.  Follow-up with EP as needed as  outpatient.  CAD (coronary artery disease)  S/p stent in 03/2024.  Continue home Imdur 30mg BID, Toprol XL 50mg at HS, Lipitor 80mg daily, and Plavix 75mg daily.  Follows with Dr. Bell (Cardiology) as outpatient.  POP (obstructive sleep apnea)  Continue home CPAP at HS.  Follows with Sleep Medicine as outpatient.  Other pulmonary embolism without acute cor pulmonale, unspecified chronicity (HCC)  Hx of PE s/p appendectomy in 05/2024.  Continue home Eliquis 5mg BID.  T2DM (type 2 diabetes mellitus) ([facility])  Lab Results   Component Value Date    HGBA1C 5.9 (H) 03/28/2025       Recent Labs     05/08/25  1135 05/08/25  1637 05/08/25  2040 05/09/25  0617   POCGLU 124 106 121 117       Blood Sugar Average: Last 72 hrs:  (P) 129.8820417479597925    Last A1c 5.9 on 3/28/24.  Home regimen: Jardiance 25mg daily and Ozempic 1mg/weekly.  Continue Jardiance 10mg daily (25mg not on formulary).  Resume Ozempic on discharge.  Continue cons. carb diet, QID accuchecks, and SSI.  Anemia  Hgb currently 9.9.  Hgb 13.5 pre-operatively.  Likely acute blood loss anemia.  Iron panel on 5/8: Iron Sat 7%, TIBC 376, Iron 28, and Ferritin 29.  Start iron supplementation after constipation resolves.  Continue to trend routine CBC.  GERD (gastroesophageal reflux disease)  Stable.  Continue home Protonix 40mg daily.  Follows with Kassi High PA-C (GI) as outpatient.  Hypothyroid  Continue home levothyroxine 75mcg daily.  Last TSH 3.749 on 4/10.  Vitamin D deficiency  Vitamin D level 19.9 on 5/8.  Started on Vitamin D 50,000u weekly.  Continue for at least 8 weeks.  Follow-up with PCP on discharge.    VTE Pharmacologic Prophylaxis:   Pharmacologic: Apixaban (Eliquis)  Mechanical VTE Prophylaxis in Place: Yes - sequential compression devices.    Current Length of Stay: 3 day(s)    Current Patient Status: Inpatient Rehab     Discharge Plan: As per primary team.    Code Status: Level 1 - Full Code    Subjective:   Pt examined while pt sitting in  recliner in pt room.  Complaints of mid back pain, 6/10, throbbing.  States that she had pain medication early this morning and was not due for it prior to therapy so her pain is slightly worse today.  Has no more therapy the rest of the day and is currently using ice with improvement.  Denies any fevers, chills, SOB, palpitations, or CP.  Has a good appetite.  LBM was on .  Passing gas and denies any abdominal pain.  Had lactulose this morning and is considering suppository later today.    Objective:     Vitals:   Temp (24hrs), Av.8 °F (36.6 °C), Min:96.4 °F (35.8 °C), Max:98.8 °F (37.1 °C)    Temp:  [96.4 °F (35.8 °C)-98.8 °F (37.1 °C)] 96.4 °F (35.8 °C)  HR:  [80-83] 83  Resp:  [18] 18  BP: ()/(54-62) 112/60  SpO2:  [95 %-97 %] 97 %  Body mass index is 32.74 kg/m².     Review of Systems   Constitutional:  Negative for appetite change, chills, fatigue and fever.   HENT:  Negative for trouble swallowing.    Eyes:  Negative for visual disturbance.   Respiratory:  Negative for cough, shortness of breath, wheezing and stridor.    Cardiovascular:  Negative for chest pain, palpitations and leg swelling.   Gastrointestinal:  Positive for constipation. Negative for abdominal distention, abdominal pain, diarrhea, nausea and vomiting.        LBM    Genitourinary:  Negative for difficulty urinating.   Musculoskeletal:  Positive for back pain (Mid back pain, 6/10, throbbing, improves with ice and rest). Negative for arthralgias and gait problem.   Neurological:  Positive for numbness (chronic numbness to LLE). Negative for dizziness, weakness, light-headedness and headaches.   Psychiatric/Behavioral:  Negative for dysphoric mood and sleep disturbance. The patient is not nervous/anxious.    All other systems reviewed and are negative.       Input and Output Summary (last 24 hours):       Intake/Output Summary (Last 24 hours) at 2025 0939  Last data filed at 2025 1740  Gross per 24 hour   Intake 240 ml    Output --   Net 240 ml       Physical Exam:     Physical Exam  Vitals and nursing note reviewed.   Constitutional:       General: She is not in acute distress.     Appearance: Normal appearance. She is obese. She is not ill-appearing.   HENT:      Head: Normocephalic and atraumatic.   Cardiovascular:      Rate and Rhythm: Normal rate and regular rhythm.      Pulses: Normal pulses.      Heart sounds: Normal heart sounds. No murmur heard.     No friction rub.   Pulmonary:      Effort: Pulmonary effort is normal. No respiratory distress.      Breath sounds: Examination of the left-lower field reveals rales. Rales present. No wheezing or rhonchi.   Abdominal:      General: Abdomen is flat. Bowel sounds are normal. There is no distension.      Palpations: Abdomen is soft. There is no mass.      Tenderness: There is no abdominal tenderness. There is no guarding or rebound.      Hernia: No hernia is present.   Musculoskeletal:      Cervical back: Normal range of motion and neck supple. No tenderness.      Right lower leg: No edema.      Left lower leg: No edema.      Comments: Wearing LSO brace.   Skin:     General: Skin is warm and dry.   Neurological:      Mental Status: She is alert and oriented to person, place, and time.   Psychiatric:         Mood and Affect: Mood normal.         Behavior: Behavior normal.         Additional Data:     Labs:    Results from last 7 days   Lab Units 05/08/25  0609   WBC Thousand/uL 8.43   HEMOGLOBIN g/dL 9.9*   HEMATOCRIT % 32.6*   PLATELETS Thousands/uL 286   SEGS PCT % 56   LYMPHO PCT % 27   MONO PCT % 11   EOS PCT % 5     Results from last 7 days   Lab Units 05/08/25  0609   SODIUM mmol/L 138   POTASSIUM mmol/L 4.0   CHLORIDE mmol/L 102   CO2 mmol/L 28   BUN mg/dL 10   CREATININE mg/dL 0.78   ANION GAP mmol/L 8   CALCIUM mg/dL 9.2   GLUCOSE RANDOM mg/dL 112         Results from last 7 days   Lab Units 05/09/25  0617 05/08/25  2040 05/08/25  1637 05/08/25  1135 05/08/25  0616  05/07/25  2108 05/07/25  1612 05/07/25  1140 05/07/25  0557 05/06/25  2040 05/06/25  1656 05/06/25  1211   POC GLUCOSE mg/dl 117 121 106 124 118 128 104 160* 120 171* 158* 144*               Labs reviewed    Imaging:    Imaging reviewed    Recent Cultures (last 7 days):           Last 24 Hours Medication List:   Current Facility-Administered Medications   Medication Dose Route Frequency Provider Last Rate    acetaminophen  650 mg Oral Q6H MONA Manisha Saldivar, EVON      aluminum-magnesium hydroxide-simethicone  30 mL Oral Q6H PRN Manisha Saldivar, EVON      apixaban  5 mg Oral Q12H Manisha Saldivar, EVON      atorvastatin  80 mg Oral QPM Manisha Saldivar, EVON      calcium carbonate  1,000 mg Oral Daily PRN Manisha Saldivar, EVON      clopidogrel  75 mg Oral Daily Manisha Saldivar, EVON      docusate sodium  100 mg Oral BID Manisha Saldivar, EVON      dofetilide  125 mcg Oral Q12H MONA Manisha Saldivar, EVON      Empagliflozin  10 mg Oral Daily Aldo Talavera, MAHI      ergocalciferol  50,000 Units Oral Weekly Aldo Talavera, MAHI      fluticasone  1 spray Nasal Daily Manisha Saldivar PA-C      insulin lispro  1-5 Units Subcutaneous HS Manisha Saldivar, EVON      insulin lispro  1-6 Units Subcutaneous TID AC Manisha Saldivar, EVON      isosorbide mononitrate  30 mg Oral BID Aldo Talavera, CRNP      lactulose  30 g Oral Daily PRN Manisha Saldivar, EVON      levothyroxine  75 mcg Oral Daily Manisha Saldivar, EVON      metoprolol succinate  50 mg Oral HS Manisha Saldivar PA-C      ondansetron  4 mg Oral Q6H PRN Manisha Saldivar, EVON      oxyCODONE  10 mg Oral Q4H PRN Manisha Saldivar, EVON      oxyCODONE  5 mg Oral Q4H PRN Manisha Saldivar, EVON      pantoprazole  40 mg Oral BID AC Manisha Saldivar, EVON      polyethylene glycol  17 g Oral Daily PRN Manisha Saldivar PA-C      pregabalin  75 mg Oral TID Manisha Saldivar PA-C       sacubitril-valsartan  1 tablet Oral BID MAHI Pierce  1 tablet Oral Daily Manisha Saldivar PA-C          M*Modal software was used to dictate this note.  It may contain errors with dictating incorrect words or incorrect spelling. Please contact the provider directly with any questions.

## 2025-05-09 NOTE — PROGRESS NOTES
Progress Note - PMR   Name: Vesna Langston 65 y.o. female I MRN: [id number]  Unit/Bed#: -01 I Date of Admission: 5/6/2025   Date of Service: 5/9/2025 I Hospital Day: 3     Assessment & Plan  Status post lumbar spinal fusion  S/p B/L navigated revision L3-S1 laminectomy/decompression, revision L3-S1 posterior instrumented spinal fusion with TLIF on 05/1/25  WBAT LLE  LSO brace OOB  F/u Dr Mcrae 2 weeks (5/15)  Keep dressing intact and incision dry until 2 week post op visit  DVT ppx: eliquis and plavix  Monitor incision  PT/OT evaluate and treat  Acute pain  Tylenol 650 Q6  Oxycodone 10/5  Pregabalin   Titrate pain meds as needed  Constipation  Scheduled colace and senna  Miralax and Lactulose PRN  Suppository PRN  Titrate meds as needed  HFrEF (heart failure with reduced ejection fraction) ([facility])  Wt Readings from Last 3 Encounters:   05/09/25 97.7 kg (215 lb 5 oz)   05/06/25 98.5 kg (217 lb 2.5 oz)   04/24/25 94.3 kg (208 lb)     LVEF 20% 3/26/24  Home: jardiance 25, Entresto BID, PRN lAsix  Entresto and Jardiance restarted 5/7  Jardiance 10 mg  Restart home meds when able  Daily weights  Sodium restrictive diet  Management per IM  CAD (coronary artery disease)  Continuing toprol, Imdur, and lipitor  S/p MI 4/2024, received PCI  T2DM (type 2 diabetes mellitus) ([facility])  Lab Results   Component Value Date    HGBA1C 5.9 (H) 03/28/2025       Recent Labs     05/08/25  1135 05/08/25  1637 05/08/25  2040 05/09/25  0617   POCGLU 124 106 121 117       Blood Sugar Average: Last 72 hrs:  (P) 129.5463525306384261  Home: jardiance, Ozempic (held in acute due to reflux)  Jardiance restarted 5/7  Management per IM  GERD (gastroesophageal reflux disease)  Continuing protonix  Hypothyroid  Continuing levothyroxine  Atrial flutter, paroxysmal ([facility])  Continuing Torpol, Tikosyn BID, and eliquis  Anemia  Baseline hgb 11-12  5/6 hgb 9.0, 5/8 9.9  Monitor h/h  S/P ICD (internal cardiac defibrillator) procedure  Implanted by Dr Lama  normal... "3/27/24 due to scar related VT and ischemic cardiomyopathy  POP (obstructive sleep apnea)  Continuing home CPAP HS  Vitamin D deficiency  Supplementation started by IM    DVT ppx: Eliquis and plavix    History of Present Illness   Vesna Langston is a 65 y.o. female who presented to the [facility] on 5/1 for elective orthopedic surgery due to lumbar radiculopathy. Pain begain 2 years ago after an injury at work. S/p exploration of fusion mass L3-S1, removal of hardware L3-S1, TLIF TLIF, laminotomy/facetectomy, L5-S1; Arthrodesis, T4-5; Arthrodesis, T3-4; Posterior segmental instrumentation/pedicle screw fixation,L3-S1. Admitted to [facility] 5/6 .     Chief Complaint: pain and constipation    Interval: Patient seen and examined in recliner this morning. No events overnight. States she was up late due to going to bed early.  Reports pain and describes it as an ache relieved by pain medications. Last BM 5/4. Patient drinking miralax and agreeable to try a suppository tonight if unable to have a BM by tonight. Sleeping well.     Review of Systems   Constitutional:  Negative for chills and fever.   Respiratory:  Negative for shortness of breath.    Cardiovascular:  Negative for chest pain, palpitations and leg swelling.   Gastrointestinal:  Positive for constipation (LBM 5/4, passing flatus). Negative for abdominal pain, diarrhea, nausea and vomiting.   Musculoskeletal:  Positive for back pain (describes as an \"ache\").   Neurological:  Positive for numbness (LLE below knee chronic).       Objective   Functional Update:  Physical Therapy Occupational Therapy Speech Therapy   Weight Bearing Status: Weight Bearing as Tolerated  Transfers: Incidental Touching  Bed Mobility: Incidental Touching  Amulation Distance (ft): 275 feet  Ambulation: Supervision  Assistive Device for Ambulation: Roller Walker  Number of Stairs: 8  Assistive Device for Stairs: Right Hand Rail (R HR and SPC)  Stair Assistance: Incidental " Touching  Ramp: Incidental Touching  Assistive Device for Ramp: Roller Walker  Discharge Recommendations: Home with:  DC Home with:: Family Support (f/u therapy services per surgeon input.)   Eating: Independent  Grooming: Supervision  Bathing: Incidental Touching  Bathing: Incidental Touching  Upper Body Dressing:  (setup)  Lower Body Dressing: Supervision  Toileting: Supervision  Tub/Shower Transfer:  (TBD)  Toilet Transfer: Supervision  Cognition: Within Defined Limits  Orientation: Person, Place, Time, Situation                   Temp:  [96.4 °F (35.8 °C)-98.8 °F (37.1 °C)] 96.4 °F (35.8 °C)  HR:  [80-83] 83  Resp:  [18] 18  BP: ()/(54-62) 112/60  SpO2:  [95 %-97 %] 97 %    Physical Exam  Constitutional:       General: She is not in acute distress.  HENT:      Head: Normocephalic and atraumatic.      Mouth/Throat:      Mouth: Mucous membranes are moist.      Pharynx: Oropharynx is clear.   Cardiovascular:      Rate and Rhythm: Normal rate and regular rhythm.   Pulmonary:      Effort: Pulmonary effort is normal.      Breath sounds: Normal breath sounds.   Abdominal:      General: Bowel sounds are normal.   Musculoskeletal:         General: Normal range of motion.      Comments: On spinal precautions  Wearing brace   Skin:     General: Skin is warm and dry.   Neurological:      Mental Status: She is alert. Mental status is at baseline.      Sensory: Sensory deficit (LLE below knee diminished sensation) present.   Psychiatric:         Mood and Affect: Mood normal.         Behavior: Behavior normal.           Scheduled Meds:  Current Facility-Administered Medications   Medication Dose Route Frequency Provider Last Rate    acetaminophen  650 mg Oral Q6H MONA Manisha Saldivar PA-C      aluminum-magnesium hydroxide-simethicone  30 mL Oral Q6H PRN Manisha Saldivar PA-C      apixaban  5 mg Oral Q12H Manisha Saldivar PA-C      atorvastatin  80 mg Oral QPM Manisha Saldivar PA-C      calcium carbonate   1,000 mg Oral Daily PRN Manisha Saldivar, PA-C      clopidogrel  75 mg Oral Daily Manisha Saldivar, PA-C      docusate sodium  100 mg Oral BID Manisha Saldivar, PA-C      dofetilide  125 mcg Oral Q12H MONA Manisha Saldivar, PA-C      Empagliflozin  10 mg Oral Daily Aldo Talavera, MAHI      ergocalciferol  50,000 Units Oral Weekly Aldo Talavera, CRNP      fluticasone  1 spray Nasal Daily Manisha Saldivar, PA-C      insulin lispro  1-5 Units Subcutaneous HS Manisha Saldivar, PA-C      insulin lispro  1-6 Units Subcutaneous TID AC Manisha Saldivar, PA-C      isosorbide mononitrate  30 mg Oral BID Aldo Talavera, MAHI      lactulose  30 g Oral Daily PRN Manisha Saldivar, PA-C      levothyroxine  75 mcg Oral Daily Manisha Saldivar, PA-C      metoprolol succinate  50 mg Oral HS Manisha Saldivar, PA-C      ondansetron  4 mg Oral Q6H PRN Manisha Saldivar, PA-C      oxyCODONE  10 mg Oral Q4H PRN Manisha Saldivar, PA-C      oxyCODONE  5 mg Oral Q4H PRN Manisha Saldivar, PA-C      pantoprazole  40 mg Oral BID AC Manisha Saldivar, PA-C      polyethylene glycol  17 g Oral Daily PRN Manisha Saldivar, PA-C      pregabalin  75 mg Oral TID Manisha Saldivar, PA-C      sacubitril-valsartan  1 tablet Oral BID Aldo Talavera, IFEANYINP      senna  1 tablet Oral Daily Manisha Saldivar, PA-C           Lab Results: I have reviewed the following results:  Results from last 7 days   Lab Units 05/08/25  0609 05/06/25  0534 05/05/25  0434   HEMOGLOBIN g/dL 9.9* 9.0* 9.4*   HEMATOCRIT % 32.6* 28.5* 30.5*   WBC Thousand/uL 8.43 7.92 8.39   PLATELETS Thousands/uL 286 224 211     Results from last 7 days   Lab Units 05/08/25  0609 05/07/25  0602 05/06/25  0534   BUN mg/dL 10 11 9   SODIUM mmol/L 138 139 138   POTASSIUM mmol/L 4.0 4.1 3.9   CHLORIDE mmol/L 102 101 101   CREATININE mg/dL 0.78 0.81 0.68              Manisha Saldivar PA-C  Physical Medicine and Rehabilitation    05/09/25

## 2025-05-09 NOTE — CASE MANAGEMENT
Case Management    Spoke with Daniela Chavez from [facility]'s comp she stated patient's scripts for medications are preferred to go to [facility]. On the script will need to include date of injury: 07/28/2022 and claim #: [id number].  Patient chose [facility] at [phone]

## 2025-05-09 NOTE — CASE MANAGEMENT
Case Management    Placed DME order with [facility](phone: [phone]) from workman's comp. Hip kit and folding walker tray to be delivered to patient's daughter's home at [address] [place] [address]. 3 in 1 commode will be delivered to patient's home as per patient request. Despite education patient states daughter's toilets are raised and she has no concern for transferring. Transport ([facility]) requested through [facility] for 5/14/25 between 11:30-1pm. [facility] will call back with specific time once ride is picked up.     Harriet Alvarado

## 2025-05-09 NOTE — ASSESSMENT & PLAN NOTE
Lab Results   Component Value Date    HGBA1C 5.9 (H) 03/28/2025       Recent Labs     05/08/25  1135 05/08/25  1637 05/08/25  2040 05/09/25  0617   POCGLU 124 106 121 117       Blood Sugar Average: Last 72 hrs:  (P) 129.0243744872703696    Last A1c 5.9 on 3/28/24.  Home regimen: Jardiance 25mg daily and Ozempic 1mg/weekly.  Continue Jardiance 10mg daily (25mg not on formulary).  Resume Ozempic on discharge.  Continue cons. carb diet, QID accuchecks, and SSI.

## 2025-05-09 NOTE — PROGRESS NOTES
"   05/09/25 0700   Pain Assessment   Pain Assessment Tool 0-10   Pain Score 4   Pain Location/Orientation Orientation: Lower;Location: Back   Pain Onset/Description Onset: Ongoing   Patient's Stated Pain Goal No pain   Hospital Pain Intervention(s) Shower/Bath;Repositioned   Restrictions/Precautions   Precautions Fall Risk;Spinal precautions   RUE Weight Bearing Per Order WBAT   LUE Weight Bearing Per Order WBAT   RLE Weight Bearing Per Order WBAT   LLE Weight Bearing Per Order WBAT   Braces or Orthoses LSO;Other (Comment)  (5# lifting restriction)   Lifestyle   Autonomy \"My daughter brought me supplies from home! I can feel human again.\"   Reciprocal Relationships Lives alone; supportive daughter and sons however all live 30-60min away   Service to Others out of work on workman's comp currently   Intrinsic Gratification Enjoys reading and crotcheting   Eating   Type of Assistance Needed Independent   Physical Assistance Level No physical assistance   Eating CARE Score 6   Oral Hygiene   Type of Assistance Needed Supervision   Physical Assistance Level No physical assistance   Comment standing at the sink   Oral Hygiene CARE Score 4   Shower/Bathe Self   Type of Assistance Needed Supervision   Physical Assistance Level No physical assistance   Comment Pt showered using shower seat and GB, HHSH. Pt able to bathe 10/10 body parts using washcloth in reacher for distal LEs and back.   Shower/Bathe Self CARE Score 4   Tub/Shower Transfer   Findings supervision RW to grab bar in shower with small lip at entrance   Upper Body Dressing   Type of Assistance Needed Set-up / clean-up   Physical Assistance Level No physical assistance   Comment seated including don/doffing the LSO   Upper Body Dressing CARE Score 5   Lower Body Dressing   Type of Assistance Needed Supervision;Adaptive equipment   Physical Assistance Level No physical assistance   Comment use of LHAE to thread over feet while seated, standing with supervision to " hike to waist   Lower Body Dressing CARE Score 4   Putting On/Taking Off Footwear   Type of Assistance Needed Supervision;Adaptive equipment   Physical Assistance Level No physical assistance   Comment use of sockaide and dressing stick for sock mgmt, slide in sneakers which she leaves tied   Putting On/Taking Off Footwear CARE Score 4   Picking Up Object   Type of Assistance Needed Supervision;Adaptive equipment   Physical Assistance Level No physical assistance   Comment supervision to  used cloths from the floor in the shower from stance using reacher   Picking Up Object CARE Score 4   Lying to Sitting on Side of Bed   Type of Assistance Needed Supervision   Physical Assistance Level No physical assistance   Comment bed flat but use of bed rail. SHe has purchased on for bed at home.   Lying to Sitting on Side of Bed CARE Score 4   Sit to Stand   Type of Assistance Needed Supervision   Physical Assistance Level No physical assistance   Comment RW   Sit to Stand CARE Score 4   Bed-Chair Transfer   Type of Assistance Needed Supervision   Physical Assistance Level No physical assistance   Comment RW for in room mobility   Chair/Bed-to-Chair Transfer CARE Score 4   Toileting Hygiene   Type of Assistance Needed Supervision   Physical Assistance Level No physical assistance   Comment Pt able to complete   Toileting Hygiene CARE Score 4   Toilet Transfer   Type of Assistance Needed Supervision;Adaptive equipment   Physical Assistance Level No physical assistance   Comment supervision to ambulate with RW to the toilet from recliner   Toilet Transfer CARE Score 4   Kitchen Mobility   Kitchen Mobility Comments practice managing a chair with arms at a table with RW to set-up transfer, as she would at the kitchen table or a restaurant.   Therapeutic Excerise-Strength   UE Strength Yes  (HEP initiated with use of the red theraband that Pt is using for PT sessions, kept on her walker, for in her room: ER, horizontal ABD,  shoulder flex, bicep curls, and tricep press. Printout provided for Pt and explaination of how to access through portal)   Cognition   Overall Cognitive Status WFL   Arousal/Participation Alert;Cooperative   Attention Within functional limits   Orientation Level Oriented X4   Memory Within functional limits   Following Commands Follows all commands and directions without difficulty   Additional Activities   Additional Activities Comments Pt engaged in light bed-making task to straighten her sheet and blankets with use of reacher in stance.   Assessment   Treatment Assessment Pt engaged in 90min OT session focused on increasing independence with selfcare routines and related mobility, use of LHAE and walker tray for item transport, and initiation of UE HEP to promote Pt activity while in her room and generalized strengthening. See above for details of performance. Pt would benefit from continued engagement in IADL tasks for higher level balance, walker mgmt with folding tray for tasks, and standing tolerance/endurance. Pt is set for discharge on Wednesday 5/14 with outpt PT services. DME submitted to workmans comp for approval of walker tray, BSC for over upstair toilet, and hip kit.   Prognosis Excellent   Problem List Decreased strength;Decreased endurance;Decreased mobility;Pain;Orthopedic restrictions   Barriers to Discharge Inaccessible home environment;Decreased caregiver support   Plan   Treatment/Interventions ADL retraining;Functional transfer training;Therapeutic exercise;Endurance training;Compensatory technique education   Progress Progressing toward goals   OT Therapy Minutes   OT Time In 0700   OT Time Out 0830   OT Total Time (minutes) 90   OT Mode of treatment - Individual (minutes) 90   OT Mode of treatment - Concurrent (minutes) 0   OT Mode of treatment - Group (minutes) 0   OT Mode of treatment - Co-treat (minutes) 0   OT Mode of Treatment - Total time(minutes) 90 minutes   OT Cumulative Minutes  270   Therapy Time missed   Time missed? No

## 2025-05-09 NOTE — PROGRESS NOTES
Physical Therapy Treatment Note    Patient's Name: Vesna Langston  : 1960     05/09/25 1000   Pain Assessment   Pain Assessment Tool 0-10   Pain Score 4   Pain Location/Orientation Orientation: Lower;Location: Back   Pain Radiating Towards [facility] Pain Intervention(s) Cold applied  (x20 min to low back)   Restrictions/Precautions   Precautions Fall Risk;Spinal precautions;Supervision on toilet/commode;Pain   Weight Bearing Restrictions Yes   RUE Weight Bearing Per Order WBAT   LUE Weight Bearing Per Order WBAT   RLE Weight Bearing Per Order WBAT   LLE Weight Bearing Per Order WBAT   Braces or Orthoses LSO  (OOB)   Subjective   Subjective Agreeable to mobilize.   Roll Left and Right   Type of Assistance Needed Supervision   Physical Assistance Level No physical assistance   Comment log roll   Roll Left and Right CARE Score 4   Sit to Lying   Type of Assistance Needed Supervision   Physical Assistance Level No physical assistance   Comment log roll   Sit to Lying CARE Score 4   Lying to Sitting on Side of Bed   Type of Assistance Needed Supervision   Physical Assistance Level No physical assistance   Comment log roll   Lying to Sitting on Side of Bed CARE Score 4   Sit to Stand   Type of Assistance Needed Supervision   Physical Assistance Level No physical assistance   Comment RW   Sit to Stand CARE Score 4   Bed-Chair Transfer   Type of Assistance Needed Supervision   Physical Assistance Level No physical assistance   Comment RW   Chair/Bed-to-Chair Transfer CARE Score 4   Walk 10 Feet   Type of Assistance Needed Supervision   Physical Assistance Level No physical assistance   Comment RW   Walk 10 Feet CARE Score 4   Walk 50 Feet with Two Turns   Type of Assistance Needed Supervision;Verbal cues   Physical Assistance Level No physical assistance   Comment RW   Walk 50 Feet with Two Turns CARE Score 4   Walk 150 Feet   Type of Assistance Needed Supervision;Verbal cues   Physical Assistance Level No physical  "assistance   Comment RW   Walk 150 Feet CARE Score 4   Ambulation   Primary Mode of Locomotion Prior to Admission Walk   Distance Walked (feet) 300 ft  (x2)   Assist Device Roller Walker   Gait Pattern L foot drag;Slow Meka;Decreased foot clearance;Improper weight shift;Step through  (3 instances of L foot catching, no LOB)   Limitations Noted In Swing;Speed;Endurance;Heel Strike   Walk Assist Level Close Supervision   Does the patient walk? 2. Yes   Curb or Single Stair   Style negotiated Curb   Type of Assistance Needed Incidental touching   Physical Assistance Level No physical assistance   Comment up + over 6\" curb w/ RW, cues for sequencing (x2)   1 Step (Curb) CARE Score 4   4 Steps   Type of Assistance Needed Supervision;Verbal cues   Physical Assistance Level No physical assistance   Comment R HR + SPC   4 Steps CARE Score 4   12 Steps   Type of Assistance Needed Supervision;Verbal cues   Physical Assistance Level No physical assistance   Comment R HR + SPC   12 Steps CARE Score 4   Stairs   # of Steps 12   Findings (S)  Pt stated at her dtr's home she will have ~7+7 stairs up to where she will be sleeping.   Therapeutic Interventions   Strengthening Instr in supine ther ex, AROM BLE, 3x10, AP, QS, GS, SAQ, HS, hip ABD/ADD. Seated ther ex, 3x10, ball squeezes w/  hip ADD, hamstring curls w/ red T-band.   Equipment Use   NuStep L1, BLE, 11.5 min.   Assessment   Treatment Assessment Pt participated in 90 min skilled PT treatment session. Pt reported 4/10 pain at start of session stating, \"nothing that I can't handle.\" Pt motivated to participate in PT stating, \"I want to do 4 laps today.\" 3 instances of L foot catching but no LOB. Pt able to progress stair training to 12 stairs today w/ S. Able to complete ther ex w/ rest breaks. Assess for clearance tomorrow to ambulate independently.   Family/Caregiver Present no   Problem List Decreased strength;Decreased endurance;Decreased mobility;Pain;Orthopedic " restrictions;Decreased range of motion;Impaired balance   Barriers to Discharge Inaccessible home environment;Decreased caregiver support   PT Barriers   Functional Limitation Stair negotiation;Walking   Plan   Treatment/Interventions Functional transfer training;LE strengthening/ROM;Elevations;Therapeutic exercise;Endurance training;Patient/family training;Equipment eval/education;Bed mobility;Gait training;Continued evaluation   Progress Progressing toward goals   PT Therapy Minutes   PT Time In 1000   PT Time Out 1130   PT Total Time (minutes) 90   PT Mode of treatment - Individual (minutes) 90   PT Mode of treatment - Concurrent (minutes) 0   PT Mode of treatment - Group (minutes) 0   PT Mode of treatment - Co-treat (minutes) 0   PT Mode of Treatment - Total time(minutes) 90 minutes   PT Cumulative Minutes 270   Therapy Time missed   Time missed? No     Sofya Rangel, PT, DPT

## 2025-05-10 LAB
GLUCOSE SERPL-MCNC: 108 MG/DL (ref 65–140)
GLUCOSE SERPL-MCNC: 132 MG/DL (ref 65–140)
GLUCOSE SERPL-MCNC: 133 MG/DL (ref 65–140)
GLUCOSE SERPL-MCNC: 153 MG/DL (ref 65–140)

## 2025-05-10 PROCEDURE — 97535 SELF CARE MNGMENT TRAINING: CPT

## 2025-05-10 PROCEDURE — 82948 REAGENT STRIP/BLOOD GLUCOSE: CPT

## 2025-05-10 PROCEDURE — 97530 THERAPEUTIC ACTIVITIES: CPT

## 2025-05-10 PROCEDURE — 97110 THERAPEUTIC EXERCISES: CPT

## 2025-05-10 PROCEDURE — 97116 GAIT TRAINING THERAPY: CPT

## 2025-05-10 RX ORDER — POLYETHYLENE GLYCOL 3350 17 G/17G
17 POWDER, FOR SOLUTION ORAL DAILY
Status: DISCONTINUED | OUTPATIENT
Start: 2025-05-10 | End: 2025-05-14 | Stop reason: HOSPADM

## 2025-05-10 RX ORDER — BISACODYL 5 MG/1
5 TABLET, DELAYED RELEASE ORAL ONCE
Status: DISCONTINUED | OUTPATIENT
Start: 2025-05-10 | End: 2025-05-13

## 2025-05-10 RX ADMIN — CLOPIDOGREL BISULFATE 75 MG: 75 TABLET, FILM COATED ORAL at 09:08

## 2025-05-10 RX ADMIN — POLYETHYLENE GLYCOL 3350 17 G: 17 POWDER, FOR SOLUTION ORAL at 09:08

## 2025-05-10 RX ADMIN — PREGABALIN 75 MG: 75 CAPSULE ORAL at 17:15

## 2025-05-10 RX ADMIN — APIXABAN 5 MG: 5 TABLET, FILM COATED ORAL at 09:07

## 2025-05-10 RX ADMIN — SENNOSIDES 17.2 MG: 8.6 TABLET, FILM COATED ORAL at 09:07

## 2025-05-10 RX ADMIN — DOCUSATE SODIUM 100 MG: 100 CAPSULE, LIQUID FILLED ORAL at 09:08

## 2025-05-10 RX ADMIN — LEVOTHYROXINE SODIUM 75 MCG: 75 TABLET ORAL at 05:51

## 2025-05-10 RX ADMIN — ACETAMINOPHEN 650 MG: 325 TABLET, FILM COATED ORAL at 23:21

## 2025-05-10 RX ADMIN — PREGABALIN 75 MG: 75 CAPSULE ORAL at 09:08

## 2025-05-10 RX ADMIN — PREGABALIN 75 MG: 75 CAPSULE ORAL at 20:50

## 2025-05-10 RX ADMIN — ATORVASTATIN CALCIUM 80 MG: 80 TABLET, FILM COATED ORAL at 17:15

## 2025-05-10 RX ADMIN — ACETAMINOPHEN 650 MG: 325 TABLET, FILM COATED ORAL at 11:47

## 2025-05-10 RX ADMIN — PANTOPRAZOLE SODIUM 40 MG: 40 TABLET, DELAYED RELEASE ORAL at 06:00

## 2025-05-10 RX ADMIN — ACETAMINOPHEN 650 MG: 325 TABLET, FILM COATED ORAL at 17:15

## 2025-05-10 RX ADMIN — EMPAGLIFLOZIN 10 MG: 10 TABLET, FILM COATED ORAL at 09:08

## 2025-05-10 RX ADMIN — INSULIN LISPRO 1 UNITS: 100 INJECTION, SOLUTION INTRAVENOUS; SUBCUTANEOUS at 17:16

## 2025-05-10 RX ADMIN — ACETAMINOPHEN 650 MG: 325 TABLET, FILM COATED ORAL at 05:52

## 2025-05-10 RX ADMIN — OXYCODONE HYDROCHLORIDE 5 MG: 5 TABLET ORAL at 09:39

## 2025-05-10 RX ADMIN — FLUTICASONE PROPIONATE 1 SPRAY: 50 SPRAY, METERED NASAL at 09:08

## 2025-05-10 RX ADMIN — DOFETILIDE 125 MCG: 0.12 CAPSULE ORAL at 20:51

## 2025-05-10 RX ADMIN — SACUBITRIL AND VALSARTAN 1 TABLET: 24; 26 TABLET, FILM COATED ORAL at 09:07

## 2025-05-10 RX ADMIN — PANTOPRAZOLE SODIUM 40 MG: 40 TABLET, DELAYED RELEASE ORAL at 17:15

## 2025-05-10 RX ADMIN — APIXABAN 5 MG: 5 TABLET, FILM COATED ORAL at 20:50

## 2025-05-10 RX ADMIN — LACTULOSE 30 G: 20 SOLUTION ORAL at 09:06

## 2025-05-10 RX ADMIN — OXYCODONE HYDROCHLORIDE 5 MG: 5 TABLET ORAL at 20:50

## 2025-05-10 RX ADMIN — SACUBITRIL AND VALSARTAN 1 TABLET: 24; 26 TABLET, FILM COATED ORAL at 17:15

## 2025-05-10 RX ADMIN — DOFETILIDE 125 MCG: 0.12 CAPSULE ORAL at 09:07

## 2025-05-10 NOTE — PROGRESS NOTES
05/10/25 1400   Pain Assessment   Pain Assessment Tool 0-10   Pain Score 4   Pain Location/Orientation Orientation: Lower;Location: Back   Pain Onset/Description Onset: Ongoing;Descriptor: Aching   Effect of Pain on Daily Activities tolerating well   Patient's Stated Pain Goal No pain   Hospital Pain Intervention(s) Medication (See MAR);Repositioned;Rest   Restrictions/Precautions   Precautions Fall Risk;Pain;Spinal precautions;Supervision on toilet/commode   Weight Bearing Restrictions Yes   RUE Weight Bearing Per Order WBAT   LUE Weight Bearing Per Order WBAT   RLE Weight Bearing Per Order WBAT   LLE Weight Bearing Per Order WBAT   ROM Restrictions Yes  (lumbar spinal precaution, lifting restriction of 5 lbs)   Braces or Orthoses LSO  (when OOB)   Cognition   Overall Cognitive Status WFL   Arousal/Participation Alert;Cooperative   Attention Within functional limits   Orientation Level Oriented X4   Memory Within functional limits   Following Commands Follows all commands and directions without difficulty   Subjective   Subjective c/o 4/10 pain in her low back but she was still able to tolerate the entire therapy session with rest breaks in between.   Roll Left and Right   Type of Assistance Needed Supervision   Physical Assistance Level No physical assistance   Comment log rolling   Roll Left and Right CARE Score 4   Sit to Lying   Type of Assistance Needed Supervision   Physical Assistance Level No physical assistance   Comment log rolling   Sit to Lying CARE Score 4   Lying to Sitting on Side of Bed   Type of Assistance Needed Supervision   Physical Assistance Level No physical assistance   Comment log rolling   Lying to Sitting on Side of Bed CARE Score 4   Sit to Stand   Type of Assistance Needed Supervision   Physical Assistance Level No physical assistance   Comment using RW   Sit to Stand CARE Score 4   Bed-Chair Transfer   Type of Assistance Needed Supervision   Physical Assistance Level No physical  assistance   Comment using RW   Chair/Bed-to-Chair Transfer CARE Score 4   Car Transfer   Type of Assistance Needed Supervision   Physical Assistance Level No physical assistance   Comment simulated at [facility] using RW   Car Transfer CARE Score 4   Walk 10 Feet   Type of Assistance Needed Supervision   Physical Assistance Level No physical assistance   Comment using RW   Walk 10 Feet CARE Score 4   Walk 50 Feet with Two Turns   Type of Assistance Needed Supervision   Physical Assistance Level No physical assistance   Comment using RW   Walk 50 Feet with Two Turns CARE Score 4   Walk 150 Feet   Type of Assistance Needed Supervision   Physical Assistance Level No physical assistance   Comment Ambulated 150 feet and 979 feet with CS using RW.   Walk 150 Feet CARE Score 4   Walking 10 Feet on Uneven Surfaces   Type of Assistance Needed Supervision   Physical Assistance Level No physical assistance   Comment CS using RW in an indoor ramp   Walking 10 Feet on Uneven Surfaces CARE Score 4   Ambulation   Does the patient walk? 2. Yes   Wheel 50 Feet with Two Turns   Reason if not Attempted Activity not applicable   Wheel 50 Feet with Two Turns CARE Score 9   Wheel 150 Feet   Reason if not Attempted Activity not applicable   Wheel 150 Feet CARE Score 9   Wheelchair mobility   Does the patient use a wheelchair? 0. No   4 Steps   Type of Assistance Needed Supervision   Physical Assistance Level No physical assistance   Comment R HR + SPC   4 Steps CARE Score 4   12 Steps   Type of Assistance Needed Supervision   Physical Assistance Level No physical assistance   Comment goes up and down 14 steps with R HR + SPC   12 Steps CARE Score 4   Toilet Transfer   Type of Assistance Needed Supervision   Physical Assistance Level No physical assistance   Comment RW   Toilet Transfer CARE Score 4   Therapeutic Interventions   Strengthening Seated ther ex on BLE's for LAQ, hip flexion, hip adduction with ball squeees and ankle DF/PF for 3 x  10 reps each. Rest breaks given in between.   Equipment Use   NuStep Level 1 x 15 minutes using BLE only   Assessment   Treatment Assessment Patient tolerated 90 minutes of skilled PT session with rest breaks in between activity. Applied her LSO brace at the start of therapy session. She needed Supervision with bed mobility (log rolling). Supervision with STS transfers and SPT using RW. She ambulated 150 feet and 979 feet with CS using RW in the hallway. She goes up and down 14 steps with R HR and SPC on L with CS. No LOB noted. She will benefit from continued killed PT services to maximize level of function, to be able to return to her PLOF and assist with safe d/c planning.   Family/Caregiver Present no   Barriers to Discharge Inaccessible home environment;Decreased caregiver support   PT Barriers   Physical Impairment Decreased strength;Decreased endurance;Impaired balance;Decreased mobility;Orthopedic restrictions;Pain   Functional Limitation Car transfers;Ramp negotiation;Stair negotiation;Standing;Transfers;Walking   Plan   Treatment/Interventions Functional transfer training;LE strengthening/ROM;Elevations;Therapeutic exercise;Endurance training;Bed mobility;Gait training   Progress Progressing toward goals   Discharge Recommendation   Equipment Recommended Walker   PT Therapy Minutes   PT Time In 1400   PT Time Out 1530   PT Total Time (minutes) 90   PT Mode of treatment - Individual (minutes) 90   PT Mode of treatment - Concurrent (minutes) 0   PT Mode of treatment - Group (minutes) 0   PT Mode of treatment - Co-treat (minutes) 0   PT Mode of Treatment - Total time(minutes) 90 minutes   PT Cumulative Minutes 360   Therapy Time missed   Time missed? No

## 2025-05-10 NOTE — PROGRESS NOTES
"   05/10/25 1020   Pain Assessment   Pain Assessment Tool 0-10   Pain Score 6   Pain Location/Orientation Orientation: Lower;Location: Back   Pain Onset/Description Onset: Ongoing;Descriptor: Aching;Descriptor: Sore   Effect of Pain on Daily Activities tolerated   Patient's Stated Pain Goal No pain   Hospital Pain Intervention(s) Cold applied;Repositioned;Ambulation/increased activity   Restrictions/Precautions   Precautions Fall Risk;Supervision on toilet/commode;Spinal precautions;Pain   Weight Bearing Restrictions Yes   ROM Restrictions Yes  (lumbar spinal precautions, lifting restriction of 5 lbs)   Braces or Orthoses LSO  (OOB)   Lifestyle   Autonomy \"If the doctor clears me, I would like to go back to work even if it's light duty, but if not I will find a job to work from home because I'm not ready to retire yet\". Pt notes her biggest concern is the steps at home.   Eating   Type of Assistance Needed Independent   Physical Assistance Level No physical assistance   Comment seated in recliner for lunch at end of Tx session with all needs within reach   Eating CARE Score 6   Eating Assessment   QI: Swallowing/Nutritional Status None of the above   Grooming   Able To Wash/Dry Hands   Findings S in stance with RW for hand hygiene post toileting   Putting On/Taking Off Footwear   Type of Assistance Needed Adaptive equipment;Independent   Physical Assistance Level No physical assistance   Comment slip on sneakers   Putting On/Taking Off Footwear CARE Score 6   Sit to Stand   Type of Assistance Needed Supervision   Physical Assistance Level No physical assistance   Comment RW   Sit to Stand CARE Score 4   Bed-Chair Transfer   Type of Assistance Needed Supervision   Physical Assistance Level No physical assistance   Comment RW   Chair/Bed-to-Chair Transfer CARE Score 4   Toileting Hygiene   Type of Assistance Needed Supervision   Physical Assistance Level No physical assistance   Comment pt S with RW for CM up/down and " seated for suzanna hygiene for 150 ml measured urine output. OT noted small skin redness and what appeared to be dried blood on side of R thigh, RN Sofía notified and wiped clean with conclusion that pt must have scratched herself overnight. Pt has not had BM in 5 days with RN aware and pt still unable to go while seated on toilet.   Toileting Hygiene CARE Score 4   Toileting   Able to Pull Clothing down yes, up yes.   Manage Hygiene Bladder   Toilet Transfer   Type of Assistance Needed Supervision   Physical Assistance Level No physical assistance   Comment S with RW to ambulate to/from bathroom   Toilet Transfer CARE Score 4   Toilet Transfer   Surface Assessed Standard Toilet   Transfer Technique Standard   Limitations Noted In Balance;Endurance;Safety;LE Strength   Adaptive Equipment Walker;Grab Bar   Kitchen Mobility   Kitchen-Mobility Level Walker   Kitchen Activity Retrieve items;Transport items   Kitchen Mobility Comments pt utilizing walker tray for item transport/retrieval, education to keep all frequently used appliances waist height or higher to maintain spinal precautions with pt receptive, pt will have clip on walker tray at home as ordered by primary OT. Edu provided to use water bottle or measuring glass to fill pots if needing to add water as pt does not have the counter to stove set up in her home in a way that counter slide method would work. Pt reports she completed extensive meal prep prior to her Sx and she will have many meals to microwave as she recovers.   Therapeutic Excerise-Strength   UE Strength Yes   Right Upper Extremity- Strength   R Position Seated   Equipment Theraband   R Weight/Reps/Sets 3x10 medium red resistance TB   RUE Strength Comment Pt seated to review UE HEP with handout previously provided for external rotation, OH shoulder flexion, horizontal shoulder abduction, tricep extension, and bicep curl. Pt tolerates with rest breaks to manage fatigue and intermittent back support  as needed.   Left Upper Extremity-Strength   LUE Strength Comment See RUROWAN for details.   Cognition   Overall Cognitive Status WFL   Arousal/Participation Alert;Cooperative   Attention Within functional limits   Orientation Level Oriented X4   Memory Within functional limits   Following Commands Follows all commands and directions without difficulty   Comments pleasant and motivated to participate   Additional Activities   Additional Activities Comments Pt CGA in stance with RW for alternating leg lifts 3x12 to challenge standing balance and help facilitate strength to navigate steps to 2nd floor of her home as this is pt biggest concern for home. Pt tolerates though reports fatigue. Pt also participating in fxnl mobility around the unit with RW supervision level to ambulate into multipurpose room and in stance at book shelf for extended time to look for a new book to read.   Activity Tolerance   Activity Tolerance Patient tolerated treatment well   Assessment   Treatment Assessment Pt participated in skilled OT Tx with focus on review for UE HEP with resistance band, kitchen mobility/use of walker tray, and fxnl mobility around unit. Pt continues to have limited stand tolerance due to low back pain and requires rest breaks. Pt seated in recliner with ice pack applied to low back at end of Tx session with call bell within reach and lunch tray. Pt continues to benefit from skilled OT services with focus on higher level IADL, LHAE training, fall prevention, standing tolerance/activity tolerance to maximize independence and safety for ADL/IADL participation once home.   Prognosis Excellent   Problem List Decreased strength;Decreased range of motion;Decreased endurance;Impaired balance;Decreased mobility;Orthopedic restrictions;Pain   Barriers to Discharge Inaccessible home environment;Decreased caregiver support   Plan   Treatment/Interventions ADL retraining;Functional transfer training;Therapeutic exercise;Endurance  training;Patient/family training;Equipment eval/education   Progress Progressing toward goals   Discharge Recommendation   Rehab Resource Intensity Level, OT   (OP PT)   OT Therapy Minutes   OT Time In 1020   OT Time Out 1150   OT Total Time (minutes) 90   OT Mode of treatment - Individual (minutes) 90   OT Mode of treatment - Concurrent (minutes) 0   OT Mode of treatment - Group (minutes) 0   OT Mode of treatment - Co-treat (minutes) 0   OT Mode of Treatment - Total time(minutes) 90 minutes   OT Cumulative Minutes 360   Therapy Time missed   Time missed? No

## 2025-05-10 NOTE — PLAN OF CARE
Problem: GASTROINTESTINAL - ADULT  Goal: Maintains or returns to baseline bowel function  Description: INTERVENTIONS:  - Assess bowel function  - Encourage oral fluids to ensure adequate hydration  - Administer ordered medications as needed  - Encourage mobilization and activity  - Consider nutritional services referral to assist patient with adequate nutrition and appropriate food choices  -Laxatives and prune juice administered prior to therapy this am, will follow up with supp if no BM  Outcome: Progressing     Problem: GENITOURINARY - ADULT  Goal: Maintains or returns to baseline urinary function  Description: INTERVENTIONS:  - Assess urinary function  - Encourage oral fluids to ensure adequate hydration if ordered  - Administer ordered medications as needed  - Offer frequent toileting  - Follow urinary retention protocol if ordered  Outcome: Progressing

## 2025-05-10 NOTE — PLAN OF CARE
Problem: SKIN/TISSUE INTEGRITY - ADULT  Goal: Incision(s), wounds(s) or drain site(s) healing without S/S of infection  Description: INTERVENTIONS  - Assess and document dressing, incision, wound bed, drain sites and surrounding tissue  - Provide patient and family education- Perform skin care/dressing changes every shift.  Outcome: Progressing     Problem: MUSCULOSKELETAL - ADULT  Goal: Maintain or return mobility to safest level of function  Description: INTERVENTIONS:  - Assess patient's ability to carry out ADLs; assess patient's baseline for ADL function and identify physical deficits which impact ability to perform ADLs (bathing, care of mouth/teeth, toileting, grooming, dressing, etc.)  - Assess/evaluate cause of self-care deficits   - Assess patient's mobility  - Assess patient's need for assistive devices and provide as appropriate- Encourage maximum independence but intervene and supervise when necessary- Involve family in performance of ADLs  - Assess for home care needs following discharge   - Provide patient education as appropriate  Outcome: Progressing

## 2025-05-10 NOTE — NURSING NOTE
Pt ambulating well with RW, LSO brace worn when OOB. Miralax in prune juice and Lactulose 30g given this am. Pt attending all therapy sessions, tolerating well. X large cont BM noted this pm.

## 2025-05-10 NOTE — QUICK NOTE
PMR Quick Note    Last BM 5/4. Given lactulose and miralax yesterday. Adding miralax daily and giving dose of bisacodyl tab. If stlil no BM by end of day would give suppository, prune juice, and/or PRN lactulose/miralax. Otherwise continue plan of care as per primary team. BP remains low, and her BP meds have been held based off of parameters. BG well controlled.      Ashley de Padua, MD  Physical Medicine and Rehabilitation

## 2025-05-11 ENCOUNTER — RESULTS FOLLOW-UP (OUTPATIENT)
Dept: CARDIOLOGY CLINIC | Facility: CLINIC | Age: 65
End: 2025-05-11

## 2025-05-11 LAB
GLUCOSE SERPL-MCNC: 121 MG/DL (ref 65–140)
GLUCOSE SERPL-MCNC: 126 MG/DL (ref 65–140)
GLUCOSE SERPL-MCNC: 128 MG/DL (ref 65–140)
GLUCOSE SERPL-MCNC: 157 MG/DL (ref 65–140)

## 2025-05-11 PROCEDURE — 97110 THERAPEUTIC EXERCISES: CPT

## 2025-05-11 PROCEDURE — 97530 THERAPEUTIC ACTIVITIES: CPT

## 2025-05-11 PROCEDURE — 82948 REAGENT STRIP/BLOOD GLUCOSE: CPT

## 2025-05-11 RX ADMIN — ATORVASTATIN CALCIUM 80 MG: 80 TABLET, FILM COATED ORAL at 17:05

## 2025-05-11 RX ADMIN — DOCUSATE SODIUM 100 MG: 100 CAPSULE, LIQUID FILLED ORAL at 08:31

## 2025-05-11 RX ADMIN — APIXABAN 5 MG: 5 TABLET, FILM COATED ORAL at 08:32

## 2025-05-11 RX ADMIN — OXYCODONE HYDROCHLORIDE 5 MG: 5 TABLET ORAL at 21:17

## 2025-05-11 RX ADMIN — DOFETILIDE 125 MCG: 0.12 CAPSULE ORAL at 21:17

## 2025-05-11 RX ADMIN — PREGABALIN 75 MG: 75 CAPSULE ORAL at 21:17

## 2025-05-11 RX ADMIN — PANTOPRAZOLE SODIUM 40 MG: 40 TABLET, DELAYED RELEASE ORAL at 17:05

## 2025-05-11 RX ADMIN — SENNOSIDES 17.2 MG: 8.6 TABLET, FILM COATED ORAL at 08:32

## 2025-05-11 RX ADMIN — LEVOTHYROXINE SODIUM 75 MCG: 75 TABLET ORAL at 06:09

## 2025-05-11 RX ADMIN — ACETAMINOPHEN 650 MG: 325 TABLET, FILM COATED ORAL at 06:09

## 2025-05-11 RX ADMIN — SACUBITRIL AND VALSARTAN 1 TABLET: 24; 26 TABLET, FILM COATED ORAL at 17:06

## 2025-05-11 RX ADMIN — DOCUSATE SODIUM 100 MG: 100 CAPSULE, LIQUID FILLED ORAL at 17:05

## 2025-05-11 RX ADMIN — FLUTICASONE PROPIONATE 1 SPRAY: 50 SPRAY, METERED NASAL at 08:32

## 2025-05-11 RX ADMIN — ACETAMINOPHEN 650 MG: 325 TABLET, FILM COATED ORAL at 17:05

## 2025-05-11 RX ADMIN — DOFETILIDE 125 MCG: 0.12 CAPSULE ORAL at 08:32

## 2025-05-11 RX ADMIN — PREGABALIN 75 MG: 75 CAPSULE ORAL at 17:05

## 2025-05-11 RX ADMIN — PANTOPRAZOLE SODIUM 40 MG: 40 TABLET, DELAYED RELEASE ORAL at 06:09

## 2025-05-11 RX ADMIN — CLOPIDOGREL BISULFATE 75 MG: 75 TABLET, FILM COATED ORAL at 08:32

## 2025-05-11 RX ADMIN — EMPAGLIFLOZIN 10 MG: 10 TABLET, FILM COATED ORAL at 08:33

## 2025-05-11 RX ADMIN — INSULIN LISPRO 1 UNITS: 100 INJECTION, SOLUTION INTRAVENOUS; SUBCUTANEOUS at 11:46

## 2025-05-11 RX ADMIN — POLYETHYLENE GLYCOL 3350 17 G: 17 POWDER, FOR SOLUTION ORAL at 08:32

## 2025-05-11 RX ADMIN — PREGABALIN 75 MG: 75 CAPSULE ORAL at 08:31

## 2025-05-11 RX ADMIN — ACETAMINOPHEN 650 MG: 325 TABLET, FILM COATED ORAL at 11:46

## 2025-05-11 RX ADMIN — APIXABAN 5 MG: 5 TABLET, FILM COATED ORAL at 21:17

## 2025-05-11 NOTE — PROGRESS NOTES
"   05/11/25 2579   Pain Assessment   Pain Assessment Tool 0-10   Pain Score 2   Pain Location/Orientation Orientation: Lower;Location: Back   Pain Onset/Description Onset: Ongoing   Patient's Stated Pain Goal No pain   Hospital Pain Intervention(s) Cold applied   Restrictions/Precautions   Precautions Fall Risk;Pain;Spinal precautions   Weight Bearing Restrictions No   ROM Restrictions Yes  (spinal)   Braces or Orthoses LSO   Lifestyle   Autonomy \"I would love to go outside! I haven't been outside since the surgery!\" when OT suggested outdoor ambulation and community distances.   Reciprocal Relationships Lives alone; supportive daughter and sons however all live 30-60min away   Service to Others out of work on workman's comp currently   Intrinsic Gratification Enjoys reading and crotcheting   Sit to Stand   Type of Assistance Needed Independent   Physical Assistance Level No physical assistance   Comment RW   Sit to Stand CARE Score 6   Bed-Chair Transfer   Type of Assistance Needed Independent   Physical Assistance Level No physical assistance   Comment RW   Chair/Bed-to-Chair Transfer CARE Score 6   Toileting Hygiene   Type of Assistance Needed Independent   Toileting Hygiene CARE Score 6   Toilet Transfer   Type of Assistance Needed Independent   Physical Assistance Level No physical assistance   Comment RW   Toilet Transfer CARE Score 6   Commmunity Re-entry   Community Re-entry Level Walker   Community Re-entry Level of Assistance Modified independent   Community Re-entry Pt engaged in outdoor ambulation on sidewalks, including education in curb steps, handicapped cut-outs, inclines, declines, and sloped driveway using RW. Pt able to return demonstrate all techniques with good safety and completed standing tasks continuously x 13min without increased pain, just \"heaviness at the incision from gravity pulling\" during steeper declining surface. Pt able to navigate multiple floor mats, heavier push door, ramps and " elevators to get through the building to the outdoors as well. 1 seated rest break once outside at bench. Pt also stopped in the cafeteria on the way back to the unit to purchase some grapes as a snack for later.   Functional Standing Tolerance   Time 13min   Activity outdoor ambulation   Therapeutic Excerise-Strength   UE Strength Yes   Right Upper Extremity- Strength   R Position Seated   Equipment Dumbbell   R Weight/Reps/Sets 2# DB OH press, rows, shldr ABD, bicep curls, internal and external rotaiton through stirring, scapular retraction and depression without weights. Reps varied depending on the challange of each exercise between 15-30. Focus placed on sitting away from back rest but engaging abdominal muscles to protect and support the low back. Pt also asked to count reps aloud to avoid holding her breath.   Left Upper Extremity-Strength   LUE Strength Comment see above   Cognition   Overall Cognitive Status WFL   Arousal/Participation Alert;Cooperative   Attention Within functional limits   Orientation Level Oriented X4   Memory Within functional limits   Following Commands Follows all commands and directions without difficulty   Activity Tolerance   Activity Tolerance Patient tolerated treatment well   Assessment   Treatment Assessment Pt tolerated 60min OT session focuse don community re-entry tasks and UB/core strengthening. See above for details of performance. Plan to assess Pt for IRP next session and re-assess independence with IADL tasks that were previously supervision level due to mobility. Pt will have dc ADL Tuesday with scheduled discharge Wednesday to her daughter's home for a few days before returning to her own home next week. Outpt PT is recommended however do not anticipate Pt will require further OT services after discharge.   Prognosis Excellent   Problem List Decreased strength;Decreased range of motion;Decreased endurance;Impaired balance;Orthopedic restrictions;Pain   Plan    Treatment/Interventions ADL retraining;Functional transfer training;Therapeutic exercise;Endurance training;Compensatory technique education   Progress Progressing toward goals   OT Therapy Minutes   OT Time In 1330   OT Time Out 1430   OT Total Time (minutes) 60   OT Mode of treatment - Individual (minutes) 60   OT Mode of treatment - Concurrent (minutes) 0   OT Mode of treatment - Group (minutes) 0   OT Mode of treatment - Co-treat (minutes) 0   OT Mode of Treatment - Total time(minutes) 60 minutes   OT Cumulative Minutes 420   Therapy Time missed   Time missed? No

## 2025-05-11 NOTE — PLAN OF CARE
Problem: MUSCULOSKELETAL - ADULT  Goal: Maintain or return mobility to safest level of function  Description: INTERVENTIONS:  - Assess patient's ability to carry out ADLs; assess patient's baseline for ADL function and identify physical deficits which impact ability to perform ADLs (bathing, care of mouth/teeth, toileting, grooming, dressing, etc.)  - Assess/evaluate cause of self-care deficits   - Assess patient's mobility  - Assess patient's need for assistive devices and provide as appropriate- Encourage maximum independence but intervene and supervise when necessary- Involve family in performance of ADLs  - Assess for home care needs following discharge   - Provide patient education as appropriate  Outcome: Progressing

## 2025-05-11 NOTE — PLAN OF CARE
Problem: GASTROINTESTINAL - ADULT  Goal: Maintains or returns to baseline bowel function  Description: INTERVENTIONS:  - Assess bowel function  - Encourage oral fluids to ensure adequate hydration  - Administer ordered medications as needed  - Encourage mobilization and activity  - Consider nutritional services referral to assist patient with adequate nutrition and appropriate food choices  Outcome: Progressing     Problem: GENITOURINARY - ADULT  Goal: Maintains or returns to baseline urinary function  Description: INTERVENTIONS:  - Assess urinary function  - Encourage oral fluids to ensure adequate hydration if ordered  - Administer ordered medications as needed  - Offer frequent toileting  - Follow urinary retention protocol if ordered  Outcome: Progressing

## 2025-05-12 LAB
ANION GAP SERPL CALCULATED.3IONS-SCNC: 10 MMOL/L (ref 4–13)
BASOPHILS # BLD AUTO: 0.06 THOUSANDS/ÂΜL (ref 0–0.1)
BASOPHILS NFR BLD AUTO: 1 % (ref 0–1)
BUN SERPL-MCNC: 9 MG/DL (ref 5–25)
CALCIUM SERPL-MCNC: 9 MG/DL (ref 8.4–10.2)
CHLORIDE SERPL-SCNC: 105 MMOL/L (ref 96–108)
CO2 SERPL-SCNC: 25 MMOL/L (ref 21–32)
CREAT SERPL-MCNC: 0.77 MG/DL (ref 0.6–1.3)
EOSINOPHIL # BLD AUTO: 0.3 THOUSAND/ÂΜL (ref 0–0.61)
EOSINOPHIL NFR BLD AUTO: 3 % (ref 0–6)
ERYTHROCYTE [DISTWIDTH] IN BLOOD BY AUTOMATED COUNT: 16.4 % (ref 11.6–15.1)
GFR SERPL CREATININE-BSD FRML MDRD: 80 ML/MIN/1.73SQ M
GLUCOSE P FAST SERPL-MCNC: 106 MG/DL (ref 65–99)
GLUCOSE SERPL-MCNC: 103 MG/DL (ref 65–140)
GLUCOSE SERPL-MCNC: 106 MG/DL (ref 65–140)
GLUCOSE SERPL-MCNC: 123 MG/DL (ref 65–140)
GLUCOSE SERPL-MCNC: 138 MG/DL (ref 65–140)
GLUCOSE SERPL-MCNC: 138 MG/DL (ref 65–140)
HCT VFR BLD AUTO: 33.3 % (ref 34.8–46.1)
HGB BLD-MCNC: 10.1 G/DL (ref 11.5–15.4)
IMM GRANULOCYTES # BLD AUTO: 0.04 THOUSAND/UL (ref 0–0.2)
IMM GRANULOCYTES NFR BLD AUTO: 0 % (ref 0–2)
LYMPHOCYTES # BLD AUTO: 2.49 THOUSANDS/ÂΜL (ref 0.6–4.47)
LYMPHOCYTES NFR BLD AUTO: 27 % (ref 14–44)
MCH RBC QN AUTO: 26.1 PG (ref 26.8–34.3)
MCHC RBC AUTO-ENTMCNC: 30.3 G/DL (ref 31.4–37.4)
MCV RBC AUTO: 86 FL (ref 82–98)
MONOCYTES # BLD AUTO: 0.81 THOUSAND/ÂΜL (ref 0.17–1.22)
MONOCYTES NFR BLD AUTO: 9 % (ref 4–12)
NEUTROPHILS # BLD AUTO: 5.43 THOUSANDS/ÂΜL (ref 1.85–7.62)
NEUTS SEG NFR BLD AUTO: 60 % (ref 43–75)
NRBC BLD AUTO-RTO: 0 /100 WBCS
PLATELET # BLD AUTO: 377 THOUSANDS/UL (ref 149–390)
PMV BLD AUTO: 9.4 FL (ref 8.9–12.7)
POTASSIUM SERPL-SCNC: 4 MMOL/L (ref 3.5–5.3)
RBC # BLD AUTO: 3.87 MILLION/UL (ref 3.81–5.12)
SODIUM SERPL-SCNC: 140 MMOL/L (ref 135–147)
WBC # BLD AUTO: 9.13 THOUSAND/UL (ref 4.31–10.16)

## 2025-05-12 PROCEDURE — 97110 THERAPEUTIC EXERCISES: CPT | Performed by: PHYSICAL THERAPIST

## 2025-05-12 PROCEDURE — 80048 BASIC METABOLIC PNL TOTAL CA: CPT | Performed by: NURSE PRACTITIONER

## 2025-05-12 PROCEDURE — 99232 SBSQ HOSP IP/OBS MODERATE 35: CPT | Performed by: PHYSICAL MEDICINE & REHABILITATION

## 2025-05-12 PROCEDURE — 82948 REAGENT STRIP/BLOOD GLUCOSE: CPT

## 2025-05-12 PROCEDURE — 85025 COMPLETE CBC W/AUTO DIFF WBC: CPT | Performed by: NURSE PRACTITIONER

## 2025-05-12 PROCEDURE — 97116 GAIT TRAINING THERAPY: CPT | Performed by: PHYSICAL THERAPIST

## 2025-05-12 PROCEDURE — 99232 SBSQ HOSP IP/OBS MODERATE 35: CPT | Performed by: INTERNAL MEDICINE

## 2025-05-12 PROCEDURE — 97530 THERAPEUTIC ACTIVITIES: CPT | Performed by: PHYSICAL THERAPIST

## 2025-05-12 PROCEDURE — 97110 THERAPEUTIC EXERCISES: CPT

## 2025-05-12 PROCEDURE — 97535 SELF CARE MNGMENT TRAINING: CPT

## 2025-05-12 RX ADMIN — ACETAMINOPHEN 650 MG: 325 TABLET, FILM COATED ORAL at 17:14

## 2025-05-12 RX ADMIN — LEVOTHYROXINE SODIUM 75 MCG: 75 TABLET ORAL at 06:28

## 2025-05-12 RX ADMIN — SENNOSIDES 17.2 MG: 8.6 TABLET, FILM COATED ORAL at 08:01

## 2025-05-12 RX ADMIN — OXYCODONE HYDROCHLORIDE 5 MG: 5 TABLET ORAL at 08:01

## 2025-05-12 RX ADMIN — SACUBITRIL AND VALSARTAN 1 TABLET: 24; 26 TABLET, FILM COATED ORAL at 08:01

## 2025-05-12 RX ADMIN — ISOSORBIDE MONONITRATE 30 MG: 30 TABLET, EXTENDED RELEASE ORAL at 08:01

## 2025-05-12 RX ADMIN — ACETAMINOPHEN 650 MG: 325 TABLET, FILM COATED ORAL at 06:28

## 2025-05-12 RX ADMIN — OXYCODONE HYDROCHLORIDE 5 MG: 5 TABLET ORAL at 01:23

## 2025-05-12 RX ADMIN — ACETAMINOPHEN 650 MG: 325 TABLET, FILM COATED ORAL at 01:22

## 2025-05-12 RX ADMIN — DOFETILIDE 125 MCG: 0.12 CAPSULE ORAL at 08:01

## 2025-05-12 RX ADMIN — DOCUSATE SODIUM 100 MG: 100 CAPSULE, LIQUID FILLED ORAL at 08:01

## 2025-05-12 RX ADMIN — POLYETHYLENE GLYCOL 3350 17 G: 17 POWDER, FOR SOLUTION ORAL at 08:01

## 2025-05-12 RX ADMIN — PANTOPRAZOLE SODIUM 40 MG: 40 TABLET, DELAYED RELEASE ORAL at 17:14

## 2025-05-12 RX ADMIN — FLUTICASONE PROPIONATE 1 SPRAY: 50 SPRAY, METERED NASAL at 08:02

## 2025-05-12 RX ADMIN — DOFETILIDE 125 MCG: 0.12 CAPSULE ORAL at 22:43

## 2025-05-12 RX ADMIN — OXYCODONE HYDROCHLORIDE 5 MG: 5 TABLET ORAL at 22:43

## 2025-05-12 RX ADMIN — ATORVASTATIN CALCIUM 80 MG: 80 TABLET, FILM COATED ORAL at 17:14

## 2025-05-12 RX ADMIN — CLOPIDOGREL BISULFATE 75 MG: 75 TABLET, FILM COATED ORAL at 08:01

## 2025-05-12 RX ADMIN — ACETAMINOPHEN 650 MG: 325 TABLET, FILM COATED ORAL at 23:03

## 2025-05-12 RX ADMIN — SACUBITRIL AND VALSARTAN 1 TABLET: 24; 26 TABLET, FILM COATED ORAL at 17:14

## 2025-05-12 RX ADMIN — PREGABALIN 75 MG: 75 CAPSULE ORAL at 17:14

## 2025-05-12 RX ADMIN — PREGABALIN 75 MG: 75 CAPSULE ORAL at 08:02

## 2025-05-12 RX ADMIN — APIXABAN 5 MG: 5 TABLET, FILM COATED ORAL at 22:43

## 2025-05-12 RX ADMIN — PANTOPRAZOLE SODIUM 40 MG: 40 TABLET, DELAYED RELEASE ORAL at 06:28

## 2025-05-12 RX ADMIN — PREGABALIN 75 MG: 75 CAPSULE ORAL at 22:43

## 2025-05-12 RX ADMIN — EMPAGLIFLOZIN 10 MG: 10 TABLET, FILM COATED ORAL at 08:02

## 2025-05-12 RX ADMIN — ACETAMINOPHEN 650 MG: 325 TABLET, FILM COATED ORAL at 12:02

## 2025-05-12 RX ADMIN — DOCUSATE SODIUM 100 MG: 100 CAPSULE, LIQUID FILLED ORAL at 17:14

## 2025-05-12 RX ADMIN — APIXABAN 5 MG: 5 TABLET, FILM COATED ORAL at 08:01

## 2025-05-12 NOTE — PLAN OF CARE
Problem: CARDIOVASCULAR - ADULT  Goal: Maintains optimal cardiac output and hemodynamic stability  Description: INTERVENTIONS:  - Monitor I/O, vital signs and rhythm  - Monitor for S/S and trends of decreased cardiac output  -  Assess quality of pulses, skin color and temperature  - Assess for signs of decreased coronary artery perfusion- Instruct patient to report change in severity of symptoms  Outcome: Progressing     Problem: GASTROINTESTINAL - ADULT  Goal: Maintains or returns to baseline bowel function  Description: INTERVENTIONS:  - Assess bowel function  - Encourage oral fluids to ensure adequate hydration  - Administer ordered medications as needed  - Encourage mobilization and activity  - Consider nutritional services referral to assist patient with adequate nutrition and appropriate food choices  Outcome: Progressing   Constipation addressed with all meds

## 2025-05-12 NOTE — ASSESSMENT & PLAN NOTE
Tylenol 650 Q6  Oxycodone 5 or 10 mg q4h PRN, wean as possible  Pregabalin   Titrate pain meds as needed

## 2025-05-12 NOTE — ASSESSMENT & PLAN NOTE
Hx of L3-S1 fusion in 2023 due to work related injury.  Had been experiencing worsening LLE radiculopathy/lower back pain/ambulatory dysfunction.  MRI lumbar spine on 1/28/25 showed multilevel lumbar spondylosis without canal stenosis but there is mild L subarticular zone narrowing and mild bilateral foraminal stenosis at L3-L4 due to L sided disc protrusion at this level.    OR on 5/1 for elective bilateral navigated revision of L3-S1 laminectomy/decompression and revision of L3-S1 posterior instrumented spinal fusion and TLIF with Dr. Mcrae (Ortho/Spine).    LSO brace when OOB.  Neurovascular checks Q shift.  Ensure adequate pain control.  Monitor incision for s/s of infection.    Follow-up with Ortho/Spine as outpatient.  Appt scheduled for 5/19.    Primary team following.  PT/OT.

## 2025-05-12 NOTE — TEAM CONFERENCE
Acute RehabilitationTeam Conference Note  Date: 5/12/2025   Time: 9:10 AM       Patient Name:  Vesna Langston       Medical Record Number: [id number]   YOB: 1960  Sex: Female          Room/Bed:  [facility] 261/[facility] 261-01  Payor Info:  Payor: WORKERS COMPENSATION / Plan:  WORKERS COMPENSATION / Product Type: Workers Compensation /      Admitting Diagnosis: Lumbar radiculopathy [M54.16]   Admit Date/Time:  5/6/2025  4:34 PM  Admission Comments: No comment available     Primary Diagnosis:  Status post lumbar spinal fusion  Principal Problem: Status post lumbar spinal fusion    Patient Active Problem List    Diagnosis Date Noted    Vitamin D deficiency 05/09/2025    Acute pain 05/06/2025    GERD (gastroesophageal reflux disease) 05/06/2025    Hypothyroid 05/06/2025    Anemia 05/02/2025    Status post lumbar spinal fusion 05/01/2025    Other spondylosis with radiculopathy, lumbar region 03/10/2025    Stage 2 chronic kidney disease 01/03/2025    Other pulmonary embolism without acute cor pulmonale, unspecified chronicity ([facility]) 01/03/2025    Acute on chronic systolic (congestive) heart failure ([facility]) 01/03/2025    Obesity, morbid ([facility]) 01/03/2025    T2DM (type 2 diabetes mellitus) ([facility]) 01/03/2025    Numbness and tingling in left arm 11/30/2024    Primary osteoarthritis of right knee 10/08/2024    History of torn meniscus of right knee 10/08/2024    Chronic pain of right knee 10/08/2024    Localized edema 10/02/2024    POP (obstructive sleep apnea) 09/23/2024    Class 1 obesity due to excess calories with serious comorbidity and body mass index (BMI) of 34.0 to 34.9 in adult 08/27/2024    Ganglion cyst of finger of left hand 08/15/2024    History of pulmonary embolism 05/07/2024    History of gastric ulcer 05/07/2024    Acquired hypothyroidism 04/19/2024    Constipation 04/10/2024    Type 2 diabetes mellitus, without long-term current use of insulin ([facility]) 04/07/2024    S/P ICD (internal cardiac defibrillator)  procedure 03/27/2024    Chronic low back pain 03/25/2024    HFrEF (heart failure with reduced ejection fraction) ([facility]) 03/25/2024    Ischemic cardiomyopathy 03/24/2024    CAD (coronary artery disease) 03/23/2024    S/P coronary artery stent placement 03/23/2024    Atrial flutter, paroxysmal ([facility]) 03/22/2024    Mixed hyperlipidemia 03/22/2024    History of tobacco abuse 03/22/2024    History of sustained ventricular tachycardia 03/22/2024    Back pain 07/31/2022    Sciatica of left side 07/31/2022       Physical Therapy:    Weight Bearing Status: Weight Bearing as Tolerated  Transfers: Incidental Touching  Bed Mobility: Incidental Touching  Amulation Distance (ft): 275 feet  Ambulation: Supervision  Assistive Device for Ambulation: Roller Walker  Number of Stairs: 8  Assistive Device for Stairs: Right Hand Rail (R HR and SPC)  Stair Assistance: Incidental Touching  Ramp: Incidental Touching  Assistive Device for Ramp: Roller Walker  Discharge Recommendations: Home with:  DC Home with:: Family Support (f/u therapy services per surgeon input.)    Pt. is a 66 y/o female who was admitted on 5/1 for elective L3-S1 laminectomy/decompression, revision L3-S1 post instrumented spinal fusion with TLIF by Dr. Mcrae.  Pt. Initially had L3-S1 fusion surgery in 2023, returned full time back to work, suffered a twisting injury which caused her to return to her original surgeon with X-rays revealed loosening of multiple screws in her lumbar spine. Since then, pt. Suffered radiculopathy and low back pain. Pt's PMhx includes: ICD placement on 3/27/24, anemia, atrial flutter, hypothyroid, GERD Type 2 DM, CAD , HFrEF. Pt. Currently needing LSO brace when OOB , spinal precautions and WBAT. Has RW and SPC.     Admitted to [facility] 5/6/25, with barriers / deficits identified: B LE weakness L> R, dec L LE sensation , dec activity tolerance with mod to severe pain, and dec balance. Pt. Currently needing min A with functional mobility using RW  which is below baseline for patient. Pt. Was fully indep PTA and was working full time at a [employer] near [place] prior to recent surgery. Pt. Lives alone in a 2 SH and has a dog and 2 cats. Pt. Has 3 children but all live about 45 mins to 2 hours away from her. She has an option to be on first floor set up or go to her daughter's house if she really needs to. Pt. Is an excellent rehab candidate and will benefit form skilled PT to maximize  strength, endurance, balance, safety and independence prior to d/c. ELOS 7-10 days.     Occupational Therapy:  Eating: Independent  Grooming: Supervision  Bathing: Incidental Touching  Bathing: Incidental Touching  Upper Body Dressing:  (setup)  Lower Body Dressing: Supervision  Toileting: Supervision  Tub/Shower Transfer:  (TBD)  Toilet Transfer: Supervision  Cognition: Within Defined Limits  Orientation: Person, Place, Time, Situation  Discharge Recommendations: Home with:  DC Home with:: Family Support (no further OT services warranted. Possibly OP PT only, see PT notes)       65 y.o. female with pain beginning 2 years ago after an injury at work. She received a fusion of L3-S1 in 2023. 2 weeks after she returned to work she suffered a twisting injury which caused her to return to her surgeon. X-ray revealed loosening of multiple screws in her lumbar spine. She was offered an SI injection to help with low back pain. 5 hours after the injection she suffered a heart attack in which a she received an ICD and stent. On presentation to Ortho office, she claims that she cannot feel her left leg. She attempts to walk on a treadmill for her cardiac rehab in which her left leg will give out 10-15 times during her hour long rehab. She reports that she is currently in physical therapy that is providing no relief. Describes pain as crushing in nature. Located in low back. + numbness . + burning. Recommended for surgical intervention. On 5/1, she went to the OR with Ortho for  Exploration of fusion mass, L3-S1; Removal of hardware, L3-S1; Arthrodesis, L5-S1 TLIF; Laminotomy/facetectomy, L5-S1; Arthrodesis, T4-5; Arthrodesis, T3-4; Posterior segmental instrumentation/pedicle screw fixation,L3-S1; EBL minimal, DARWIN drain intact. Postoperatively, she was cleared for WBAT to B/L LE, LSO brace when OOB, lumbar spinal precautions.  SLIM was consulted re: postop medical management. She was with noted B/L LE edema, and was initiated on PO Lasix for 3 doses total, with recommendations for prn Lasix.  PMH includes:CHF, CAD s/p stent, PE on Eliquis, Aflutter, hypothyroid, DM, anxiety, depression, GERD, MI, HLD, ischemic cardiomyopathy s/p ICD, migraines. PLOF was independent with BADLs using RW, SPC on the steps. She was receiving some assist with IADLs from friends, neighbors, family and has hired a  once a week. Currently Pt is performing below functional baseline due to impairments in ROM d/t spinal precautions, weakness, pain, balance but demonstrates strong motivation to return to prior activity level and independence. Goals are set for mod I in 7-10 days (DC Wed 5/14/25) through training in LHAE, compensatory strategies, task modifications, strengthening, and endurance training. Recommended DME: BSC, hip kit, folding walker tray, shower chair (pt reports she has shower chair at home).    Speech Therapy:           No notes on file    Nursing Notes:  Appetite: Good  Diet Type: Diabetic, Thin Liquids                                                                     Pain Location/Orientation: Orientation: Lower, Location: Back  Pain Score: 5                       Hospital Pain Intervention(s): Medication (See MAR), Rest, Relaxation technique          Vesna Langston is a 65 y.o. female who presented to the [facility] on 5/1 for elective orthopedic surgery due to lumbar radiculopathy. Pain begain 2 years ago after an injury at work. S/p exploration of fusion mass  L3-S1, removal of hardware L3-S1, TLIF TLIF, laminotomy/facetectomy, L5-S1; Arthrodesis, T4-5; Arthrodesis, T3-4; Posterior segmental instrumentation/pedicle screw fixation,L3-S1. Admitted to [facility] 5/6 .      Pain managed with tylenol 650 q6hrs, prn oxy 5mg/10mg q4 hrs. A-flutter managed pacemaker, Toprol XL 50mg at HS and Tikosyn 125mcg BID for rate control. Eliquis 5mg BID for anticoagulation. HFrEF managed with Jardiance 10mg daily and Entresto 24-26 mg BID. CAD managed with Imdur 30mg BID, Toprol XL 50mg at HS, Lipitor 80mg daily, and Plavix 75mg daily. T2DM Jardiance 10mg daily, cons. carb diet, QID accuchecks, and SSI. GERD managed with Protonix 40mg daily. Hypothyroid managed with levothyroxine 75mcg daily. Bowel regimen managed with colace 100mg BID, senna 8.6mg daily, prn lactulose, prn mirlax. Flonase daily for congestion.          This week we will continue to monitor vital signs and lab values. We will manage pain with the above orders so that the patient can participate in her therapy sessions to her optimal abilities. We will teach the patient how to conserve energy so that she may perform ADL's independently as much as she can. We will educate the patient on the importance of repositioning and off-loading pressure to help lower the risk of skin breakdown. We will prevent falls by keeping personal items and call bell within reach, we will also initiate and maintain hourly rounding.      Case Management:     Discharge Planning  Living Arrangements: Lives Alone  Support Systems: Self, Family members  Assistance Needed: tbd  Type of Current Residence: Private residence (3 story)  Current Home Care Services: No  Patient admitted to [facility] on 5/6 after inpatient hospital stay , s/p spinal surgery;rocedure(s):Exploration of fusion mass, L3-S1,Removal of hardware, L3-S1, Arthrodesis, L5-S1 TLIF, Laminotomy/facetectomy, L5-S1, Arthrodesis, L4-5 PL, Arthrodesis, L3-4 PL, Posterior segmental  instrumentation/pedicle screw fixation,L3-S1, 3D computer-assisted navigation CM assessment to be done.       Is the patient actively participating in therapies? yes  List any modifications to the treatment plan: Patient is tolerating 3 hour program no changes to treatment plan    Barriers Interventions   Spinal precautions LHAE, education, compensatory strategies, task modifications, brace management   Generalized weakness There ex, graded tasks   Endurance  Graded progressive tasks, endurance training, pacing,    pain Pain medication and adjustments, ice pack, repositioning,    constipation Medication,    Acute blood loss anemia Awaiting constipation resolution to implement iron   AYESHA Stair training     Is the patient making expected progress toward goals? yes  List any update or changes to goals: No changes to goals. Goals set for mod I.     Medical Goals: Patient will be medically stable for discharge to [facility] restrictive envrionment upon completion of rehab program    Weekly Team Goals:   Rehab Team Goals  ADL Team Goal: Patient will be independent with ADLs with least restrictive device upon completion of rehab program  Bowel/Bladder Team Goal: Patient will return to premorbid level for bladder/bowel management upon completion of rehab program  Transfer Team Goal: Patient will be independent with transfers with least restrictive device upon completion of rehab program  Locomotion Team Goal: Patient will be independent with locomotion with least restrictive device upon completion of rehab program    Discussion:   Patient making excellent progress this week with skilled OT/PT therapies. Above barriers have been addressed all week and patient making good progress. Barriers will be resolved in time for discharge Wednesday 5/14. DME: ordered through workman's comp. Ordered commode, walker tray and hip kit. Patient has RW at home. Pt will require no follow up therapy services at this time and recommended to follow  up with surgeon to determine his recommendations for continued therapy based on post op restrictions. Patient completed 14 steps with SPC at supervision level. Patient to progress to mod I by time of discharge.     Anticipated Discharge Date:  Wednesday 5/14 No follow up services DME: walker tray, hip kit and commode  [facility] Team Members Present:  The following team members are supervising care for this patient and were present during this Weekly Team Conference.    Physician: Dr. Valdemar MD  : Harriet Alvarado MS OTR/L  Registered Nurse: Venecia Peña, RN, BSN, CRRN  Physical Therapist: Kerri Cortés, PT  Occupational Therapist: Harriet Alvarado MS, OTR/L

## 2025-05-12 NOTE — PROGRESS NOTES
05/12/25 1100   Pain Assessment   Pain Assessment Tool 0-10   Pain Score 2   Pain Location/Orientation Orientation: Lower;Location: Back   Restrictions/Precautions   Precautions Fall Risk;Pain;Spinal precautions   ROM Restrictions Yes  (spinal precautions)   Braces or Orthoses LSO   Sit to Lying   Type of Assistance Needed Independent;Adaptive equipment   Physical Assistance Level No physical assistance   Comment Mod I utilizing log roll technique with R side bedrail- will have R side bedrail at home;   Sit to Lying CARE Score 6   Lying to Sitting on Side of Bed   Type of Assistance Needed Independent;Adaptive equipment   Physical Assistance Level No physical assistance   Comment Mod I utilizing log roll technique with R side bedrail- will have R side bedrail at home;   Lying to Sitting on Side of Bed CARE Score 6   Sit to Stand   Type of Assistance Needed Independent;Adaptive equipment   Physical Assistance Level No physical assistance   Comment Mod I with RW   Sit to Stand CARE Score 6   Bed-Chair Transfer   Type of Assistance Needed Independent;Adaptive equipment   Physical Assistance Level No physical assistance   Comment Mod I with RW   Chair/Bed-to-Chair Transfer CARE Score 6   Toileting Hygiene   Type of Assistance Needed Independent   Physical Assistance Level No physical assistance   Comment Mod I with RW   Toileting Hygiene CARE Score 6   Toilet Transfer   Type of Assistance Needed Independent   Physical Assistance Level No physical assistance   Comment Mod I with RW   Toilet Transfer CARE Score 6   Cognition   Overall Cognitive Status WFL   Arousal/Participation Alert;Cooperative   Attention Within functional limits   Orientation Level Oriented X4   Memory Within functional limits   Following Commands Follows all commands and directions without difficulty   Activity Tolerance   Activity Tolerance Patient tolerated treatment well   Medical Staff Made Aware Bryanna MENDENHALL, spoke with OT prior to OT session  in regards to making pt IRPs. Per PT pt IND with RW for toileting routine. OT trailed during OT session with pt made IRPs during day, Sup for toileting needs at night. Sofía LEW, made aware. Sofía LEW, made aware that pt urinated during OT session for I&Os. No hat available at time with pt urinating prior to being able to get hat for toilet. Again, RN, made aware.   Assessment   Treatment Assessment Pt participated in 30min OT session with fair activity tolerance with focus on ADL training. Pt seated in bedside recliner upon therapist entering room. Pt agreeable to OT session. Pt trialed for IRPs. Pt participated in functional mobility from bedside recliner to toilet in bathroom at Mod IND level with RW. Pt participated in toileting routine at Mod I level with RW. Pt able to complete all aspects of toileting routine at Mod I level. Pt participated in washing hands standing at sinkside at Mod I level with RW. Pt returned to bedside recliner with RW at Mod I level. Pt cleared at this time for IRPs during daytime. Pt trailed for IRPs from bed level. Pt able to participate in all bed mobility tasks utilizing log roll technique for adherence to spinal precautions 100% of the time with no cueing needed. Pt able to complete bed mobility tasks at Mod I level with R bedrail (pt reports ordering and already having set-up for d/c). Pt able to doff/chinedu LSO brace safely adhereing to spinal precautions 100% of the time at IND level. Pt can participate at Mod I level at this time for bed mobility tasks however pt fearful of BP dropping and low lighting at night with pt made Supervision for overnight toileting needs. Nursing, Aleksandra, made aware with board in room updated. Pt seated in bedside recliner at end of therapy session with call bell in hand, all needs within reach, and no further OT needs. Pt would benefit from continued OT services to progress pt through POC to meet established OT goals. Continue with established  POC at this time.   Prognosis Excellent   Problem List Decreased strength;Decreased range of motion;Decreased endurance;Impaired balance;Orthopedic restrictions;Pain   Barriers to Discharge Inaccessible home environment;Decreased caregiver support   Plan   Treatment/Interventions ADL retraining;Functional transfer training;Therapeutic exercise;Endurance training;Patient/family training;Equipment eval/education;Bed mobility;Compensatory technique education   OT Therapy Minutes   OT Time In 1100   OT Time Out 1130   OT Total Time (minutes) 30   OT Mode of treatment - Individual (minutes) 30   OT Mode of treatment - Concurrent (minutes) 0   OT Mode of treatment - Group (minutes) 0   OT Mode of treatment - Co-treat (minutes) 0   OT Mode of Treatment - Total time(minutes) 30 minutes   OT Cumulative Minutes 450   Therapy Time missed   Time missed? No

## 2025-05-12 NOTE — ASSESSMENT & PLAN NOTE
Scheduled bowel regimen  Monitor closely for incontinence. Will follow BMs and adjust bowel regimen to target soft, formed stools q1-2 days, per pt's regular schedule.

## 2025-05-12 NOTE — ASSESSMENT & PLAN NOTE
S/p B/L navigated revision L3-S1 laminectomy/decompression, revision L3-S1 posterior instrumented spinal fusion with TLIF on 05/1/25  WBAT LLE  LSO brace OOB  F/u Dr Mcrae 2 weeks, scheduled for 5/19 outpatient  Keep dressing intact and incision dry until 2 week post op visit  DVT ppx: eliquis and plavix  Monitor incision  PT/OT evaluate and treat

## 2025-05-12 NOTE — ASSESSMENT & PLAN NOTE
Lab Results   Component Value Date    HGBA1C 5.9 (H) 03/28/2025       Recent Labs     05/11/25  1608 05/11/25  2118 05/12/25  0554 05/12/25  1115   POCGLU 121 126 123 138       Blood Sugar Average: Last 72 hrs:  (P) [id number]  Home: jarwade, Ozempic (held in acute due to reflux)  Jardiance restarted 5/7  Management per IM

## 2025-05-12 NOTE — ASSESSMENT & PLAN NOTE
Hgb currently 10.1.  Hgb 13.5 pre-operatively.  Likely acute blood loss anemia.  Iron panel on 5/8: Iron Sat 7%, TIBC 376, Iron 28, and Ferritin 29.  Start iron supplementation after constipation resolves.  Continue to trend routine CBC.

## 2025-05-12 NOTE — PLAN OF CARE
Problem: MUSCULOSKELETAL - ADULT  Goal: Maintain or return mobility to safest level of function  Description: INTERVENTIONS:  - Assess patient's ability to carry out ADLs; assess patient's baseline for ADL function and identify physical deficits which impact ability to perform ADLs (bathing, care of mouth/teeth, toileting, grooming, dressing, etc.)  - Assess/evaluate cause of self-care deficits   - Assess patient's mobility  - Assess patient's need for assistive devices and provide as appropriate- Encourage maximum independence but intervene and supervise when necessary- Involve family in performance of ADLs  - Assess for home care needs following discharge   - Provide patient education as appropriate  Outcome: Progressing  Goal: Maintain proper alignment of affected body part  Description: INTERVENTIONS:  - Support, maintain and protect limb and body alignment  - Provide patient/ family with appropriate education  Outcome: Progressing

## 2025-05-12 NOTE — PROGRESS NOTES
05/12/25 0830   Pain Assessment   Pain Assessment Tool 0-10   Pain Score 2   Pain Location/Orientation Orientation: Lower;Location: Back   Patient's Stated Pain Goal No pain   Hospital Pain Intervention(s) Ambulation/increased activity   Restrictions/Precautions   Precautions Fall Risk;Pain;Spinal precautions   Braces or Orthoses LSO   Cognition   Arousal/Participation Alert;Cooperative   Subjective   Subjective Pt reports she took pain medication prior to session and currently rates pain 2/10. Wants to practice full flight of stairs in stairwell today.   Sit to Stand   Type of Assistance Needed Independent   Physical Assistance Level No physical assistance   Sit to Stand CARE Score 6   Bed-Chair Transfer   Type of Assistance Needed Independent;Adaptive equipment   Physical Assistance Level No physical assistance   Chair/Bed-to-Chair Transfer CARE Score 6   Car Transfer   Type of Assistance Needed Supervision;Adaptive equipment   Physical Assistance Level No physical assistance   Comment simulated at [facility]   Car Transfer CARE Score 4   Walk 10 Feet   Type of Assistance Needed Supervision;Adaptive equipment   Physical Assistance Level No physical assistance   Walk 10 Feet CARE Score 4   Walk 50 Feet with Two Turns   Type of Assistance Needed Supervision;Adaptive equipment   Physical Assistance Level No physical assistance   Walk 50 Feet with Two Turns CARE Score 4   Walk 150 Feet   Type of Assistance Needed Supervision;Adaptive equipment   Physical Assistance Level No physical assistance   Walk 150 Feet CARE Score 4   Walking 10 Feet on Uneven Surfaces   Type of Assistance Needed Supervision;Adaptive equipment   Physical Assistance Level No physical assistance   Walking 10 Feet on Uneven Surfaces CARE Score 4   Ambulation   Primary Mode of Locomotion Prior to Admission Walk   Distance Walked (feet) 1500 ft   Assist Device Roller Walker   Gait Pattern Slow Meka;Decreased foot clearance   Limitations Noted In  Speed;Strength;Swing   Walk Assist Level Supervision   Does the patient walk? 2. Yes   Wheel 50 Feet with Two Turns   Reason if not Attempted Activity not applicable   Wheel 50 Feet with Two Turns CARE Score 9   Wheel 150 Feet   Reason if not Attempted Activity not applicable   Wheel 150 Feet CARE Score 9   Wheelchair mobility   Does the patient use a wheelchair? 0. No   Curb or Single Stair   Style negotiated Single stair   Type of Assistance Needed Incidental touching;Adaptive equipment   Physical Assistance Level No physical assistance   1 Step (Curb) CARE Score 4   4 Steps   Type of Assistance Needed Incidental touching;Adaptive equipment   Physical Assistance Level No physical assistance   4 Steps CARE Score 4   12 Steps   Type of Assistance Needed Incidental touching;Adaptive equipment   Physical Assistance Level No physical assistance   12 Steps CARE Score 4   Stairs   Type Stairs   # of Steps 12   Assist Devices Single Rail;Cane   Findings Practiced full flight in stairwell today; pt S level for ascending but CG for descending and overall more apprehensive with descending. Practiced 2x.   Equipment Use   NuStep L2 x 20 min, LE's only   Assessment   Treatment Assessment Pt participated in 90 min skilled PT session focusing on overall functional mobility with focus on stairs. Assessed pt for progression to daytime IRP's; pt did well navigating within her room. Spoke with OT to assess during their session as well, and then will officially progress to IRP's. Pt able to participate in full session without seated rest breaks. Walked to cafeteria and to cardiac rehab with no issues. Initiated stair [facility] for first time today; pt apprehensive when descending stairs but able to do so without any LOB or buckling or safety issues. Also spent time practicing car transfer on Nustep; simulating getting in/out of passenger side with RW and reminder to avoid any twisting motion as part of spinal precautions.  Pt progressing towards goals for mod I.   Barriers to Discharge Inaccessible home environment;Decreased caregiver support   PT Barriers   Physical Impairment Decreased strength;Decreased endurance;Impaired balance;Decreased mobility;Orthopedic restrictions;Pain   Functional Limitation Stair negotiation   Plan   Treatment/Interventions Functional transfer training;LE strengthening/ROM;Elevations;Therapeutic exercise;Endurance training;Patient/family training;Equipment eval/education;Bed mobility;Gait training   Progress Progressing toward goals   Discharge Recommendation   Equipment Recommended Walker   PT Therapy Minutes   PT Time In 0830   PT Time Out 1000   PT Total Time (minutes) 90   PT Mode of treatment - Individual (minutes) 90   PT Mode of treatment - Concurrent (minutes) 0   PT Mode of treatment - Group (minutes) 0   PT Mode of treatment - Co-treat (minutes) 0   PT Mode of Treatment - Total time(minutes) 90 minutes   PT Cumulative Minutes 450   Therapy Time missed   Time missed? No

## 2025-05-12 NOTE — ASSESSMENT & PLAN NOTE
Wt Readings from Last 3 Encounters:   05/12/25 97.3 kg (214 lb 9.6 oz)   05/06/25 98.5 kg (217 lb 2.5 oz)   04/24/25 94.3 kg (208 lb)     Last ECHO on 10/8/24: EF 40-45%, systolic function moderately reduced, and diastolic function abnormal.  Continue home Toprol XL 50mg at HS.    Continue Jardiance and Entresto.    Had been on Lasix 40mg PRN at home.  Monitor I&Os and daily weights.  Follows with Cardiology (Dr. Bell) as outpatient.

## 2025-05-12 NOTE — PROGRESS NOTES
Progress Note - PMR   Name: Vesna Langston 65 y.o. female I MRN: [id number]  Unit/Bed#: -01 I Date of Admission: 5/6/2025   Date of Service: 5/12/2025 I Hospital Day: 6     Assessment & Plan  Status post lumbar spinal fusion  S/p B/L navigated revision L3-S1 laminectomy/decompression, revision L3-S1 posterior instrumented spinal fusion with TLIF on 05/1/25  WBAT LLE  LSO brace OOB  F/u Dr Mcrae 2 weeks, scheduled for 5/19 outpatient  Keep dressing intact and incision dry until 2 week post op visit  DVT ppx: eliquis and plavix  Monitor incision  PT/OT evaluate and treat  Acute pain  Tylenol 650 Q6  Oxycodone 5 or 10 mg q4h PRN, wean as possible  Pregabalin   Titrate pain meds as needed  Constipation  Scheduled bowel regimen  Monitor closely for incontinence. Will follow BMs and adjust bowel regimen to target soft, formed stools q1-2 days, per pt's regular schedule.  HFrEF (heart failure with reduced ejection fraction) ([facility])  Wt Readings from Last 3 Encounters:   05/12/25 97.3 kg (214 lb 9.6 oz)   05/06/25 98.5 kg (217 lb 2.5 oz)   04/24/25 94.3 kg (208 lb)     LVEF 20% 3/26/24  Home: jardiance 25, Entresto BID, PRN lAsix  Entresto and Jardiance restarted 5/7  Jardiance 10 mg  Restart home meds when able  Daily weights  Sodium restrictive diet  Management per IM  CAD (coronary artery disease)  Continuing toprol, Imdur, and lipitor  S/p MI 4/2024, received PCI  T2DM (type 2 diabetes mellitus) ([facility])  Lab Results   Component Value Date    HGBA1C 5.9 (H) 03/28/2025       Recent Labs     05/11/25  1608 05/11/25  2118 05/12/25  0554 05/12/25  1115   POCGLU 121 126 123 138       Blood Sugar Average: Last 72 hrs:  (P) [id number]  Home: jardiance, Ozempic (held in acute due to reflux)  Jardiance restarted 5/7  Management per IM  GERD (gastroesophageal reflux disease)  Continuing protonix  Hypothyroid  Continuing levothyroxine  Atrial flutter, paroxysmal (HCC)  Continuing Torpol, Tikosyn BID, and  joquivett  Anemia  Baseline hgb 11-12  5/6 hgb 9.0, 5/8 9.9  Monitor h/h  S/P ICD (internal cardiac defibrillator) procedure  Implanted by Dr Lama 3/27/24 due to scar related VT and ischemic cardiomyopathy  POP (obstructive sleep apnea)  Continuing home CPAP HS  Vitamin D deficiency  Supplementation started by IM    #Disposition: Anticipate discharge home alone on 5/14/25 with HEP, therapy services to be deferred until after follow-up surgical evaluation    History  Vesna Langston is a 65 y.o. female who presented to the [facility] on 5/1 for elective orthopedic surgery due to lumbar radiculopathy. Pain begain 2 years ago after an injury at work. S/p exploration of fusion mass L3-S1, removal of hardware L3-S1, TLIF TLIF, laminotomy/facetectomy, L5-S1; Arthrodesis, T4-5; Arthrodesis, T3-4; Posterior segmental instrumentation/pedicle screw fixation,L3-S1. Admitted to [facility] 5/6 .     Interval: Seen face-to-face at bedside. Voiding spontaneously, continent. Last BM 5/10/25, continent.     Objective :  Temp:  [97.4 °F (36.3 °C)] 97.4 °F (36.3 °C)  HR:  [70-82] (P) 77  BP: ()/(44-70) (P) 98/62  Resp:  [16-18] (P) 16  SpO2:  [94 %-98 %] (P) 94 %  O2 Device: (P) None (Room air)    Functional Update:  Physical Therapy Occupational Therapy   Weight Bearing Status: Weight Bearing as Tolerated  Transfers: Incidental Touching  Bed Mobility: Incidental Touching  Amulation Distance (ft): 275 feet  Ambulation: Supervision  Assistive Device for Ambulation: Roller Walker  Number of Stairs: 8  Assistive Device for Stairs: Right Hand Rail (R HR and SPC)  Stair Assistance: Incidental Touching  Ramp: Incidental Touching  Assistive Device for Ramp: Roller Walker  Discharge Recommendations: Home with:  DC Home with:: Family Support (f/u therapy services per surgeon input.)   Eating: Independent  Grooming: Supervision  Bathing: Incidental Touching  Bathing: Incidental Touching  Upper Body Dressing:  (setup)  Lower  Body Dressing: Supervision  Toileting: Supervision  Tub/Shower Transfer:  (TBD)  Toilet Transfer: Supervision  Cognition: Within Defined Limits  Orientation: Person, Place, Time, Situation       GEN: Patient seen resting comfortably in bedside chair, NAD  HEENT: NCAT; mucous membranes moist  CV: No extremity edema  PULM: Breathing comfortably on room air  ABD: Non-distended  SKIN: No obvious rashes or lesions on exposed skin  MSK: LSO brace currently donned.  NEURO:  Awake and alert, answering questions appropriately to context; able to follow multi-step commands with clear consistency  Speech is fluent and organized  CN II-XII grossly intact  Moving all extremities spontaneously in chair    Scheduled Meds:  Current Facility-Administered Medications   Medication Dose Route Frequency Provider Last Rate    acetaminophen  650 mg Oral Q6H [facility] Manisha Saldivar PA-C      aluminum-magnesium hydroxide-simethicone  30 mL Oral Q6H PRN Manisha Saldivar PA-C      apixaban  5 mg Oral Q12H Manisha Saldivar PA-C      atorvastatin  80 mg Oral QPM Manisha Saldivar PA-C      bisacodyl  5 mg Oral Once Ashley Depadua, MD      bisacodyl  10 mg Rectal Daily PRN Manisha Saldivar PA-C      calcium carbonate  1,000 mg Oral Daily PRN Manisha Saldivar PA-C      clopidogrel  75 mg Oral Daily Manisha Saldivar PA-C      docusate sodium  100 mg Oral BID Manisha Saldivar PA-C      dofetilide  125 mcg Oral Q12H [facility] Manisha Saldivar PA-C      Empagliflozin  10 mg Oral Daily MAHI Pierce      ergocalciferol  50,000 Units Oral Weekly MAHI Pierce      fluticasone  1 spray Nasal Daily Manisha Saldivar PA-C      insulin lispro  1-5 Units Subcutaneous HS Manisha Saldivar PA-C      insulin lispro  1-6 Units Subcutaneous TID AC Manisha Saldivar PA-C      isosorbide mononitrate  30 mg Oral BID MAHI Pierce      lactulose  30 g Oral Daily PRN Manisha Saldivar  PA-C      levothyroxine  75 mcg Oral Daily Manisha Saldivar, PA-C      metoprolol succinate  50 mg Oral HS Manisha Saldivar, PA-C      ondansetron  4 mg Oral Q6H PRN Manisha Saldivar, PA-C      oxyCODONE  10 mg Oral Q4H PRN Manisha Saldivar, PA-C      oxyCODONE  5 mg Oral Q4H PRN Manisha Saldivar, PA-C      pantoprazole  40 mg Oral BID AC Manisha Saldivar, PA-C      polyethylene glycol  17 g Oral Daily PRN Manisha Saldivar, PA-C      polyethylene glycol  17 g Oral Daily Ashley Depadua, MD      pregabalin  75 mg Oral TID Manisha Saldivar, EVON      sacubitril-valsartan  1 tablet Oral BID MAHI Pierce      senna  2 tablet Oral Daily Emily Dexter MD         Lab Results: I have reviewed the following results:  Results from last 7 days   Lab Units 05/12/25  0457 05/08/25  0609 05/06/25  0534   HEMOGLOBIN g/dL 10.1* 9.9* 9.0*   HEMATOCRIT % 33.3* 32.6* 28.5*   WBC Thousand/uL 9.13 8.43 7.92   PLATELETS Thousands/uL 377 286 224     Results from last 7 days   Lab Units 05/12/25 0457 05/08/25  0609 05/07/25  0602   BUN mg/dL 9 10 11   SODIUM mmol/L 140 138 139   POTASSIUM mmol/L 4.0 4.0 4.1   CHLORIDE mmol/L 105 102 101   CREATININE mg/dL 0.77 0.78 0.81              I have spent a total time of 40 minutes in caring for this patient on the day of the visit/encounter including Patient and family education, Impressions, Counseling / Coordination of care, Documenting in the medical record, Reviewing/placing orders in the medical record (including tests, medications, and/or procedures), Obtaining or reviewing history  , and Communicating with other healthcare professionals .    Hebert Aldrich MD  Attending Physician  Physical Medicine and Rehabilitation  [facility]

## 2025-05-12 NOTE — ASSESSMENT & PLAN NOTE
Lab Results   Component Value Date    HGBA1C 5.9 (H) 03/28/2025       Recent Labs     05/11/25  1121 05/11/25  1608 05/11/25  2118 05/12/25  0554   POCGLU 157* 121 126 123       Blood Sugar Average: Last 72 hrs:  (P) [id number]    Last A1c 5.9 on 3/28/24.  Home regimen: Jardiance 25mg daily and Ozempic 1mg/weekly.  Continue Jardiance 10mg daily (25mg not on formulary).  Resume Ozempic on discharge.  Continue cons. carb diet, QID accuchecks, and SSI.

## 2025-05-12 NOTE — PROGRESS NOTES
05/12/25 1230   Pain Assessment   Pain Assessment Tool 0-10   Pain Score 2   Pain Location/Orientation Orientation: Lower;Location: Back   Restrictions/Precautions   Precautions Fall Risk;Pain;Spinal precautions   ROM Restrictions Yes  (spinal precautions)   Braces or Orthoses LSO   Picking Up Object   Type of Assistance Needed Independent;Adaptive equipment   Physical Assistance Level No physical assistance   Comment Mod IND with RW and LH reacher for accessing items off floor (light pet bowl, handtowel).   Picking Up Object CARE Score 6   Sit to Stand   Type of Assistance Needed Independent;Adaptive equipment   Physical Assistance Level No physical assistance   Comment Mod IND with RW   Sit to Stand CARE Score 6   Bed-Chair Transfer   Type of Assistance Needed Independent;Adaptive equipment   Physical Assistance Level No physical assistance   Comment Mod IND with RW   Chair/Bed-to-Chair Transfer CARE Score 6   Kitchen Mobility   Kitchen-Mobility Level Walker   Kitchen Activity Retrieve items;Transport items   Kitchen Mobility Comments Pt participated in kitchen mobility task with RW and folding walker tray. Pt able to transport coffee mug, access items at waist level (pt reports home has already been modified for safety at home), and able to transport items on RW folding tray at Mod IND level.   Light Housekeeping   Light Housekeeping Level Walker   Light Housekeeping Level of Assistance Modified independent   Light Housekeeping Pt able to retrieve clothing from laundry basket seated (mocked home set-up), fold small clothing items seated at IND level, and hang clothing items seated on hangers. Pt transported clothing items at Mod IND level with RW and folding walker tray. Pt able to hang at Mod IND level with RW in stance at closet.   Therapeutic Excerise-Strength   UE Strength Yes   Right Upper Extremity- Strength   R Position Seated  (seated supported in chair with back support)   Equipment Patrice CERVANTES  Strength Comment Pt participated in bilateral UE strengthening exercises with 2# weight, one arm at a time, [id number] in following planes: bicep curl, chest press, and supination/pronation. Pt reports mild fatigue upon completion of exercises in bilateral UEs.   Cognition   Overall Cognitive Status WFL   Arousal/Participation Alert;Cooperative   Attention Within functional limits   Orientation Level Oriented X4   Memory Within functional limits   Following Commands Follows all commands and directions without difficulty   Comments Pt remains pleasant, cooperative, and motivated to participate.   Activity Tolerance   Activity Tolerance Patient tolerated treatment well   Assessment   Treatment Assessment Pt participated in 60min OT session with fair activity tolerance with focus on ADL training and therapeutic exercises. Pt seated in bedside recliner at start of therapy session. Pt agreeable to OT session. Pt participated in functional mobility from room to therapy gym at Mod IND level with RW. Pt participated in  reacher training in regards to accessing cat bowls off ground with  reacher. Pt able to complete at Mod I with RW and LH reacher. Pt able to demonstrate 2x with LH reacher at Mod IND level with good safety and carryover noted with RW and LH reacher. Pt and OTR discussed cleaning litter boxes with modifcations discussed with pt to look into purchasing dowel to attach cat litter  to for daily cleanings with pt reporting neighbor cleans litter box out completely 1x per week/as needed but open to modifcation. Pt participated in donning/doffing folding walker tray on walker at IND level. Pt participated in kitchen mobility tasks at Mod I level with RW and folding tray. See above note for further details. Pt participated in bilateral UE strengthening exericses. See above note for performance details. Pt participated in laundry task of hanging/folding at Mod IND level with RW and task modifications. See  above note for further performance details. Pt able to incorporate and verbalize spinal precautions throughout OT session 100% of the time IND without cues utilizing appropriate AD as needed. Pt seated in bedside recliner at end of therapy session with call bell in hand, all items within reach, and no futher OT needs. Pt anticipated d/c 5/14/2025 to daughter's house with outpt PT services. Pt anticipated d/c ADL tomorrow for progression of ADLs for safe d/c to home with family support. Continue with established POC at this time.   Prognosis Excellent   Problem List Decreased strength;Decreased range of motion;Decreased endurance;Impaired balance;Orthopedic restrictions;Pain   Barriers to Discharge Inaccessible home environment;Decreased caregiver support   Plan   Treatment/Interventions ADL retraining;Functional transfer training;Therapeutic exercise;Endurance training;Patient/family training;Equipment eval/education;Bed mobility;Compensatory technique education   OT Therapy Minutes   OT Time In 1230   OT Time Out 1330   OT Total Time (minutes) 60   OT Mode of treatment - Individual (minutes) 60   OT Mode of treatment - Concurrent (minutes) 0   OT Mode of treatment - Group (minutes) 0   OT Mode of treatment - Co-treat (minutes) 0   OT Mode of Treatment - Total time(minutes) 60 minutes   OT Cumulative Minutes 510   Therapy Time missed   Time missed? No

## 2025-05-12 NOTE — ASSESSMENT & PLAN NOTE
Wt Readings from Last 3 Encounters:   05/12/25 97.3 kg (214 lb 9.6 oz)   05/06/25 98.5 kg (217 lb 2.5 oz)   04/24/25 94.3 kg (208 lb)     LVEF 20% 3/26/24  Home: jardiance 25, Entresto BID, PRN lAsix  Entresto and Jardiance restarted 5/7  Jardiance 10 mg  Restart home meds when able  Daily weights  Sodium restrictive diet  Management per IM

## 2025-05-12 NOTE — PROGRESS NOTES
Progress Note - Internal Medicine   Name: Vesna Langston 65 y.o. female I MRN: [id number]  Unit/Bed#: -01 I Date of Admission: 5/6/2025   Date of Service: 5/12/2025 I Hospital Day: 6    Assessment & Plan  Status post lumbar spinal fusion  Hx of L3-S1 fusion in 2023 due to work related injury.  Had been experiencing worsening LLE radiculopathy/lower back pain/ambulatory dysfunction.  MRI lumbar spine on 1/28/25 showed multilevel lumbar spondylosis without canal stenosis but there is mild L subarticular zone narrowing and mild bilateral foraminal stenosis at L3-L4 due to L sided disc protrusion at this level.    OR on 5/1 for elective bilateral navigated revision of L3-S1 laminectomy/decompression and revision of L3-S1 posterior instrumented spinal fusion and TLIF with Dr. Mcrae (Ortho/Spine).    LSO brace when OOB.  Neurovascular checks Q shift.  Ensure adequate pain control.  Monitor incision for s/s of infection.    Follow-up with Ortho/Spine as outpatient.  Appt scheduled for 5/19.    Primary team following.  PT/OT.  Atrial flutter, paroxysmal (HCC)  S/p pacemaker.  Continue home Toprol XL 50mg at HS and Tikosyn 125mcg BID for rate control.  Continue Eliquis 5mg BID for anticoagulation.  Monitor routine VS.  Replete electrolytes as needed.  Follows with Dr. Bell (Cardiology) as outpatient.  HFrEF (heart failure with reduced ejection fraction) ([facility])  Wt Readings from Last 3 Encounters:   05/12/25 97.3 kg (214 lb 9.6 oz)   05/06/25 98.5 kg (217 lb 2.5 oz)   04/24/25 94.3 kg (208 lb)     Last ECHO on 10/8/24: EF 40-45%, systolic function moderately reduced, and diastolic function abnormal.  Continue home Toprol XL 50mg at HS.    Continue Jardiance and Entresto.    Had been on Lasix 40mg PRN at home.  Monitor I&Os and daily weights.  Follows with Cardiology (Dr. Bell) as outpatient.  S/P ICD (internal cardiac defibrillator) procedure  Implanted in 03/2024 due to scar related VT and ischemic  cardiomyopathy.  Follow-up with EP as needed as outpatient.  CAD (coronary artery disease)  S/p stent in 03/2024.  Continue home Imdur 30mg BID, Toprol XL 50mg at HS, Lipitor 80mg daily, and Plavix 75mg daily.  Follows with Dr. Bell (Cardiology) as outpatient.  POP (obstructive sleep apnea)  Continue home CPAP at HS.  Follows with Sleep Medicine as outpatient.  Other pulmonary embolism without acute cor pulmonale, unspecified chronicity ([facility])  Hx of PE s/p appendectomy in 05/2024.  Continue home Eliquis 5mg BID.  T2DM (type 2 diabetes mellitus) ([facility])  Lab Results   Component Value Date    HGBA1C 5.9 (H) 03/28/2025       Recent Labs     05/11/25  1121 05/11/25  1608 05/11/25  2118 05/12/25  0554   POCGLU 157* 121 126 123       Blood Sugar Average: Last 72 hrs:  (P) 131.8297705880351707    Last A1c 5.9 on 3/28/24.  Home regimen: Jardiance 25mg daily and Ozempic 1mg/weekly.  Continue Jardiance 10mg daily (25mg not on formulary).  Resume Ozempic on discharge.  Continue cons. carb diet, QID accuchecks, and SSI.  Anemia  Hgb currently 10.1.  Hgb 13.5 pre-operatively.  Likely acute blood loss anemia.  Iron panel on 5/8: Iron Sat 7%, TIBC 376, Iron 28, and Ferritin 29.  Start iron supplementation after constipation resolves.  Continue to trend routine CBC.  GERD (gastroesophageal reflux disease)  Stable.  Continue home Protonix 40mg daily.  Follows with Kassi High PA-C (GI) as outpatient.  Hypothyroid  Continue home levothyroxine 75mcg daily.  Last TSH 3.749 on 4/10.  Vitamin D deficiency  Vitamin D level 19.9 on 5/8.  Started on Vitamin D 50,000u weekly.  Continue for at least 8 weeks.  Follow-up with PCP on discharge.    VTE Pharmacologic Prophylaxis:   Pharmacologic: Apixaban (Eliquis)  Mechanical VTE Prophylaxis in Place: Yes - sequential compression devices.    Current Length of Stay: 6 day(s)    Current Patient Status: Inpatient Rehab     Discharge Plan: As per primary team.    Code Status: Level 1 - Full  "Code    Subjective:   Pt examined while pt sitting in recliner in pt room.  Currently states that her back is \"sore.\"  Had done a lot with PT this morning including stairs.  Denies any lightheadedness, dizziness, fevers, or chills.  Has low BP at times but denies any symptoms when this occurs.  LBM was 2 days ago.  Has no other concerns or complaints at this time.    Objective:     Vitals:   Temp (24hrs), Av.5 °F (36.4 °C), Min:97.4 °F (36.3 °C), Max:97.6 °F (36.4 °C)    Temp:  [97.4 °F (36.3 °C)-97.6 °F (36.4 °C)] 97.4 °F (36.3 °C)  HR:  [70-82] 82  Resp:  [18] 18  BP: ()/(44-70) 124/70  SpO2:  [96 %-98 %] 96 %  Body mass index is 32.63 kg/m².     Review of Systems   Constitutional:  Negative for appetite change, chills, fatigue and fever.   HENT:  Negative for trouble swallowing.    Eyes:  Negative for visual disturbance.   Respiratory:  Negative for cough, shortness of breath, wheezing and stridor.    Cardiovascular:  Negative for chest pain, palpitations and leg swelling.   Gastrointestinal:  Negative for abdominal distention, abdominal pain, constipation, diarrhea, nausea and vomiting.        LBM 5/10   Genitourinary:  Negative for difficulty urinating, dysuria, frequency, hematuria and urgency.   Musculoskeletal:  Positive for back pain (mid back pain, \"sore\", worse after therapies, improves with rest and ice). Negative for arthralgias and gait problem.   Neurological:  Negative for dizziness, weakness, light-headedness, numbness and headaches.   Psychiatric/Behavioral:  Negative for dysphoric mood and sleep disturbance. The patient is not nervous/anxious.    All other systems reviewed and are negative.       Input and Output Summary (last 24 hours):       Intake/Output Summary (Last 24 hours) at 2025 0906  Last data filed at 2025 0801  Gross per 24 hour   Intake 840 ml   Output 1900 ml   Net -1060 ml       Physical Exam:     Physical Exam  Vitals and nursing note reviewed. "   Constitutional:       General: She is not in acute distress.     Appearance: Normal appearance. She is obese. She is not ill-appearing.   HENT:      Head: Normocephalic and atraumatic.   Cardiovascular:      Rate and Rhythm: Normal rate and regular rhythm.      Pulses: Normal pulses.      Heart sounds: Normal heart sounds. No murmur heard.     No friction rub.   Pulmonary:      Effort: Pulmonary effort is normal. No respiratory distress.      Breath sounds: Normal breath sounds. No wheezing or rhonchi.   Abdominal:      General: Abdomen is flat. Bowel sounds are normal. There is no distension.      Palpations: Abdomen is soft. There is no mass.      Tenderness: There is no abdominal tenderness. There is no guarding or rebound.      Hernia: No hernia is present.   Musculoskeletal:      Cervical back: Normal range of motion and neck supple. No tenderness.      Right lower leg: Edema (Trace non-pitting edema) present.      Left lower leg: Edema (Trace non-pitting edema) present.      Comments: Wearing LSO brace.   Skin:     General: Skin is warm and dry.      Capillary Refill: Capillary refill takes less than 2 seconds.   Neurological:      Mental Status: She is alert and oriented to person, place, and time.   Psychiatric:         Mood and Affect: Mood normal.         Behavior: Behavior normal.         Additional Data:     Labs:    Results from last 7 days   Lab Units 05/12/25  0457   WBC Thousand/uL 9.13   HEMOGLOBIN g/dL 10.1*   HEMATOCRIT % 33.3*   PLATELETS Thousands/uL 377   SEGS PCT % 60   LYMPHO PCT % 27   MONO PCT % 9   EOS PCT % 3     Results from last 7 days   Lab Units 05/12/25  0457   SODIUM mmol/L 140   POTASSIUM mmol/L 4.0   CHLORIDE mmol/L 105   CO2 mmol/L 25   BUN mg/dL 9   CREATININE mg/dL 0.77   ANION GAP mmol/L 10   CALCIUM mg/dL 9.0   GLUCOSE RANDOM mg/dL 106         Results from last 7 days   Lab Units 05/12/25  0554 05/11/25  2118 05/11/25  1608 05/11/25  1121 05/11/25  0603 05/10/25  2034  05/10/25  1614 05/10/25  1113 05/10/25  0557 05/09/25  2057 05/09/25  1658 05/09/25  1204   POC GLUCOSE mg/dl 123 126 121 157* 128 132 153* 133 108 138 111 159*               Labs reviewed    Imaging:    Imaging reviewed    Recent Cultures (last 7 days):           Last 24 Hours Medication List:   Current Facility-Administered Medications   Medication Dose Route Frequency Provider Last Rate    acetaminophen  650 mg Oral Q6H MONA Manisha Saldivar PA-C      aluminum-magnesium hydroxide-simethicone  30 mL Oral Q6H PRN Manisha Saldivar PA-C      apixaban  5 mg Oral Q12H Manisha Saldivar, EVON      atorvastatin  80 mg Oral QPM Manisha Saldivar PA-C      bisacodyl  5 mg Oral Once Ashley Depadua, MD      bisacodyl  10 mg Rectal Daily PRN Manisha Saldivar PA-C      calcium carbonate  1,000 mg Oral Daily PRN Manisha Saldivar PA-C      clopidogrel  75 mg Oral Daily Manisha Saldivar PA-C      docusate sodium  100 mg Oral BID Manisha Saldivar PA-C      dofetilide  125 mcg Oral Q12H MONA Manisha Saldivar PA-C      Empagliflozin  10 mg Oral Daily Aldo Talavera, MAHI      ergocalciferol  50,000 Units Oral Weekly Aldo Talavera, MAHI      fluticasone  1 spray Nasal Daily Manisha Saldivar PA-C      insulin lispro  1-5 Units Subcutaneous HS Manisha Saldivar PA-C      insulin lispro  1-6 Units Subcutaneous TID AC Manisha Saldivar PA-C      isosorbide mononitrate  30 mg Oral BID Aldo Talavera, MAHI      lactulose  30 g Oral Daily PRN Manisha Saldivar PA-C      levothyroxine  75 mcg Oral Daily Manisha Saldivar PA-C      metoprolol succinate  50 mg Oral HS Manisha Saldivar PA-C      ondansetron  4 mg Oral Q6H PRN Manisha Saldivar PA-C      oxyCODONE  10 mg Oral Q4H PRN Manisha Saldivar PA-C      oxyCODONE  5 mg Oral Q4H PRN Manisha Saldivar PA-C      pantoprazole  40 mg Oral BID AC Manisha Saldivar PA-C      polyethylene glycol  17 g Oral Daily  LAVERN Saldivar PA-C      polyethylene glycol  17 g Oral Daily Ashley Depadua, MD      pregabalin  75 mg Oral TID Manisha Saldivar PA-C      sacubitril-valsartan  1 tablet Oral BID MAHI Pierce      senna  2 tablet Oral Daily Emily Dexter MD          M*Modal software was used to dictate this note.  It may contain errors with dictating incorrect words or incorrect spelling. Please contact the provider directly with any questions.

## 2025-05-13 ENCOUNTER — TELEPHONE (OUTPATIENT)
Age: 65
End: 2025-05-13

## 2025-05-13 LAB
GLUCOSE SERPL-MCNC: 112 MG/DL (ref 65–140)
GLUCOSE SERPL-MCNC: 154 MG/DL (ref 65–140)
GLUCOSE SERPL-MCNC: 156 MG/DL (ref 65–140)
GLUCOSE SERPL-MCNC: 173 MG/DL (ref 65–140)

## 2025-05-13 PROCEDURE — 97116 GAIT TRAINING THERAPY: CPT

## 2025-05-13 PROCEDURE — 99232 SBSQ HOSP IP/OBS MODERATE 35: CPT | Performed by: INTERNAL MEDICINE

## 2025-05-13 PROCEDURE — 99232 SBSQ HOSP IP/OBS MODERATE 35: CPT | Performed by: PHYSICAL MEDICINE & REHABILITATION

## 2025-05-13 PROCEDURE — 82948 REAGENT STRIP/BLOOD GLUCOSE: CPT

## 2025-05-13 PROCEDURE — 97110 THERAPEUTIC EXERCISES: CPT

## 2025-05-13 PROCEDURE — 97530 THERAPEUTIC ACTIVITIES: CPT

## 2025-05-13 PROCEDURE — 97535 SELF CARE MNGMENT TRAINING: CPT

## 2025-05-13 RX ORDER — FERROUS GLUCONATE 324(38)MG
324 TABLET ORAL EVERY OTHER DAY
Status: DISCONTINUED | OUTPATIENT
Start: 2025-05-13 | End: 2025-05-14 | Stop reason: HOSPADM

## 2025-05-13 RX ORDER — ACETAMINOPHEN 325 MG/1
650 TABLET ORAL EVERY 6 HOURS PRN
Status: DISCONTINUED | OUTPATIENT
Start: 2025-05-13 | End: 2025-05-14 | Stop reason: HOSPADM

## 2025-05-13 RX ADMIN — ISOSORBIDE MONONITRATE 30 MG: 30 TABLET, EXTENDED RELEASE ORAL at 21:06

## 2025-05-13 RX ADMIN — LEVOTHYROXINE SODIUM 75 MCG: 75 TABLET ORAL at 06:17

## 2025-05-13 RX ADMIN — OXYCODONE HYDROCHLORIDE 5 MG: 5 TABLET ORAL at 21:05

## 2025-05-13 RX ADMIN — FERROUS GLUCONATE 324 MG: 324 TABLET ORAL at 12:21

## 2025-05-13 RX ADMIN — DOFETILIDE 125 MCG: 0.12 CAPSULE ORAL at 08:42

## 2025-05-13 RX ADMIN — SACUBITRIL AND VALSARTAN 1 TABLET: 24; 26 TABLET, FILM COATED ORAL at 08:42

## 2025-05-13 RX ADMIN — ACETAMINOPHEN 650 MG: 325 TABLET, FILM COATED ORAL at 12:21

## 2025-05-13 RX ADMIN — PANTOPRAZOLE SODIUM 40 MG: 40 TABLET, DELAYED RELEASE ORAL at 06:17

## 2025-05-13 RX ADMIN — ACETAMINOPHEN 650 MG: 325 TABLET, FILM COATED ORAL at 18:01

## 2025-05-13 RX ADMIN — SENNOSIDES 17.2 MG: 8.6 TABLET, FILM COATED ORAL at 08:42

## 2025-05-13 RX ADMIN — INSULIN LISPRO 1 UNITS: 100 INJECTION, SOLUTION INTRAVENOUS; SUBCUTANEOUS at 12:22

## 2025-05-13 RX ADMIN — INSULIN LISPRO 1 UNITS: 100 INJECTION, SOLUTION INTRAVENOUS; SUBCUTANEOUS at 21:08

## 2025-05-13 RX ADMIN — DOFETILIDE 125 MCG: 0.12 CAPSULE ORAL at 21:06

## 2025-05-13 RX ADMIN — SACUBITRIL AND VALSARTAN 1 TABLET: 24; 26 TABLET, FILM COATED ORAL at 18:01

## 2025-05-13 RX ADMIN — ISOSORBIDE MONONITRATE 30 MG: 30 TABLET, EXTENDED RELEASE ORAL at 08:42

## 2025-05-13 RX ADMIN — EMPAGLIFLOZIN 10 MG: 10 TABLET, FILM COATED ORAL at 08:42

## 2025-05-13 RX ADMIN — PREGABALIN 75 MG: 75 CAPSULE ORAL at 18:01

## 2025-05-13 RX ADMIN — METOPROLOL SUCCINATE 50 MG: 50 TABLET, EXTENDED RELEASE ORAL at 21:07

## 2025-05-13 RX ADMIN — ACETAMINOPHEN 650 MG: 325 TABLET, FILM COATED ORAL at 06:17

## 2025-05-13 RX ADMIN — ATORVASTATIN CALCIUM 80 MG: 80 TABLET, FILM COATED ORAL at 18:01

## 2025-05-13 RX ADMIN — PANTOPRAZOLE SODIUM 40 MG: 40 TABLET, DELAYED RELEASE ORAL at 18:01

## 2025-05-13 RX ADMIN — CLOPIDOGREL BISULFATE 75 MG: 75 TABLET, FILM COATED ORAL at 08:42

## 2025-05-13 RX ADMIN — INSULIN LISPRO 1 UNITS: 100 INJECTION, SOLUTION INTRAVENOUS; SUBCUTANEOUS at 18:03

## 2025-05-13 RX ADMIN — DOCUSATE SODIUM 100 MG: 100 CAPSULE, LIQUID FILLED ORAL at 18:01

## 2025-05-13 RX ADMIN — OXYCODONE HYDROCHLORIDE 5 MG: 5 TABLET ORAL at 03:31

## 2025-05-13 RX ADMIN — APIXABAN 5 MG: 5 TABLET, FILM COATED ORAL at 21:07

## 2025-05-13 RX ADMIN — DOCUSATE SODIUM 100 MG: 100 CAPSULE, LIQUID FILLED ORAL at 08:42

## 2025-05-13 RX ADMIN — PREGABALIN 75 MG: 75 CAPSULE ORAL at 21:07

## 2025-05-13 RX ADMIN — PREGABALIN 75 MG: 75 CAPSULE ORAL at 08:42

## 2025-05-13 RX ADMIN — APIXABAN 5 MG: 5 TABLET, FILM COATED ORAL at 08:42

## 2025-05-13 NOTE — CASE MANAGEMENT
Case Management    Transportation confirmed 1:30pm tomorrow please bring patient to lobby at 1:15pm. If any concerns with transportation please call Marisol at [phone] extension [phone]      Patient confirmed commode, walker tray and hip kit have all been delivered home. Pt set for d/c tomorrow 5/14. Will follow up with surgeon to determine continued therapy recommendations.

## 2025-05-13 NOTE — ASSESSMENT & PLAN NOTE
Lab Results   Component Value Date    HGBA1C 5.9 (H) 03/28/2025       Recent Labs     05/12/25  2045 05/13/25  0548 05/13/25  1150 05/13/25  1611   POCGLU 138 112 173* 156*       Blood Sugar Average: Last 72 hrs:  (P) 133.4  Home: jarShilo olveraempic (held in acute due to reflux)  Jardiance restarted 5/7  Management per IM

## 2025-05-13 NOTE — ASSESSMENT & PLAN NOTE
S/p B/L navigated revision L3-S1 laminectomy/decompression, revision L3-S1 posterior instrumented spinal fusion with TLIF on 05/1/25  WBAT LLE  LSO brace OOB  F/u Dr Mcrae 2 weeks, scheduled for 5/19 outpatient  Keep dressing intact and incision dry until outpatient post op visit  DVT ppx: eliquis and plavix  Monitor incision  PT/OT evaluate and treat

## 2025-05-13 NOTE — NURSING NOTE
"Pt is alert and oriented, she denied need for medication for pain, stated \" My 3/10 pain level is tolerable.\"  Pt has In Room Privileges with RW, she may also ambulate on the unit. Call bell is in reach, pt makes needs known.   "

## 2025-05-13 NOTE — ASSESSMENT & PLAN NOTE
Wt Readings from Last 3 Encounters:   05/13/25 97.7 kg (215 lb 4.8 oz)   05/06/25 98.5 kg (217 lb 2.5 oz)   04/24/25 94.3 kg (208 lb)     Last ECHO on 10/8/24: EF 40-45%, systolic function moderately reduced, and diastolic function abnormal.  Continue home Toprol XL 50mg at HS.    Continue Jardiance and Entresto.    Had been on Lasix 40mg PRN at home.  Monitor I&Os and daily weights.  Follows with Cardiology (Dr. Bell) as outpatient.

## 2025-05-13 NOTE — PLAN OF CARE
Problem: METABOLIC, FLUID AND ELECTROLYTES - ADULT  Goal: Glucose maintained within target range  Description: INTERVENTIONS:  - Monitor Blood Glucose as ordered  - Assess for signs and symptoms of hyperglycemia and hypoglycemia  - Administer ordered medications to maintain glucose within target range  - Assess nutritional intake and initiate nutrition service referral as needed  Outcome: Progressing     Problem: SKIN/TISSUE INTEGRITY - ADULT  Goal: Skin Integrity remains intact(Skin Breakdown Prevention)  Description: Assess:  -Perform Lanre assessment every shift  -Clean and moisturize skin every shift  -Inspect skin when repositioning, toileting, and assisting with ADLS  -Assess under medical devices such as Allevyns every shift  -Assess extremities for adequate circulation and sensation     Bed Management:  -Have minimal linens on bed & keep smooth, unwrinkled  -Change linens as needed when moist or perspiring  -Avoid sitting or lying in one position for more than two hours while in bed  -Keep HOB at degrees     Activity:  -Mobilize patient two times a day  -Encourage activity and walks on unit  -Encourage or provide ROM exercises  -Turn and reposition patient every two Hours  -Use appropriate equipment to lift or move patient in bed  -Instruct/ Assist with weight shifting every two hours when out of bed in chair  -Consider limitation of chair time two hour intervals    Skin Care:  -Avoid use of baby powder, tape, friction and shearing, hot water or constrictive clothing  -Relieve pressure over bony prominences using repositioning   -Do not massage red bony areas    Next Steps:  -Teach patient strategies to minimize risks such as repositioning   -Consider consults to  interdisciplinary teams such as therapy  Outcome: Progressing

## 2025-05-13 NOTE — TELEPHONE ENCOUNTER
Caller: Vesna Langston    Doctor: Dr. Bell    Reason for call: Patient scheduled for May 28, 2025 for a follow-up from being in Hospital and her blood pressure keeps dropping. She stated they told her to follow-up with Cardiology once discharged which she is planning on tomorrow. Patient is wondering if she needs to adjust medication prior to being seen. She only wants to see Dr. Bell.    Call back#: [phone]

## 2025-05-13 NOTE — ASSESSMENT & PLAN NOTE
Wt Readings from Last 3 Encounters:   05/13/25 97.7 kg (215 lb 4.8 oz)   05/06/25 98.5 kg (217 lb 2.5 oz)   04/24/25 94.3 kg (208 lb)     LVEF 20% 3/26/24  Home: jardiance 25, Entresto BID, PRN lAsix  Entresto and Jardiance restarted 5/7  Daily weights  Sodium restrictive diet  Management per IM

## 2025-05-13 NOTE — PROGRESS NOTES
05/13/25 0700   Pain Assessment   Pain Score No Pain   Restrictions/Precautions   Precautions Fall Risk;Spinal precautions   Weight Bearing Restrictions No   RUE Weight Bearing Per Order WBAT   LUE Weight Bearing Per Order WBAT   RLE Weight Bearing Per Order WBAT   LLE Weight Bearing Per Order WBAT   ROM Restrictions Yes   Braces or Orthoses LSO   Eating   Type of Assistance Needed Independent   Comment provided breakfast   Eating CARE Score 6   Oral Hygiene   Type of Assistance Needed Independent   Comment in stance at sink   Oral Hygiene CARE Score 6   Shower/Bathe Self   Type of Assistance Needed Independent;Adaptive equipment   Comment completed shower this session seated on shower chair. pt able to wash 10/10 parts, utilized LHR with wash cloth to wash feet. pt will have LHS at DC   Shower/Bathe Self CARE Score 6   Tub/Shower Transfer   Findings Canelo with RW for shower transfer   Upper Body Dressing   Type of Assistance Needed Independent   Comment able to don OH shirt and LSO brace   Upper Body Dressing CARE Score 6   Lower Body Dressing   Type of Assistance Needed Independent;Adaptive equipment   Comment utilized LHR to don undergarment/shorts, CM indep   Lower Body Dressing CARE Score 6   Putting On/Taking Off Footwear   Type of Assistance Needed Independent;Adaptive equipment   Comment utilized socka id to don socks, slip on sneakers indep   Putting On/Taking Off Footwear CARE Score 6   Sit to Stand   Type of Assistance Needed Independent;Adaptive equipment   Comment Canelo with RW   Sit to Stand CARE Score 6   Bed-Chair Transfer   Type of Assistance Needed Independent;Adaptive equipment   Comment Canelo with RW   Chair/Bed-to-Chair Transfer CARE Score 6   Toileting Hygiene   Type of Assistance Needed Independent   Toileting Hygiene CARE Score 6   Toilet Transfer   Type of Assistance Needed Independent   Toilet Transfer CARE Score 6   Exercise Tools   Other Exercise Tool 1 therapist reviewed pts UE HEP with  red tband. pt able to demonstrate all exercises at indep level. Completed 3x10 with rest break in between.   Cognition   Overall Cognitive Status WFL   Arousal/Participation Alert;Cooperative   Attention Within functional limits   Orientation Level Oriented X4   Memory Within functional limits   Following Commands Follows all commands and directions without difficulty   Additional Activities   Additional Activities Comments Engaged pt in fx mobility to cafeteria. pt utilized folding walker tray to tranport items. pt made self small bowl of fruit, demonstrating G balance and bimanual release to scoop fruit into bowl. completed at Canelo level.   Activity Tolerance   Activity Tolerance Patient tolerated treatment well   Assessment   Treatment Assessment Engaged pt in 90mins of skilled OT services with focus on DC ADL, UE HEP and comm reentry. Pt fx at Canelo level with RW and use of LHAE. Pt with no further barriers. Pt with anticiapted DC 5/14/25 with OP PT services only pending surgery appt on 5/19.   Prognosis Excellent   Barriers to Discharge None   Discharge Recommendation   Equipment Recommended Bedside commode;Hip Kit ($);Shower/Tub chair with back ($)  (folding walker tray)   Commode Type Standard   Additional Comments  pt reports BSC, hip kit, folding wakler tray has been delivered. reports she has a shower chair at home.   Discharge Summary DC anticipated for 5/14/25   OT Therapy Minutes   OT Time In 0700   OT Time Out 0830   OT Total Time (minutes) 90   OT Mode of treatment - Individual (minutes) 90   OT Mode of treatment - Concurrent (minutes) 0   OT Mode of treatment - Group (minutes) 0   OT Mode of treatment - Co-treat (minutes) 0   OT Mode of Treatment - Total time(minutes) 90 minutes   OT Cumulative Minutes 600   Therapy Time missed   Time missed? No

## 2025-05-13 NOTE — PROGRESS NOTES
Progress Note - PMR   Name: Vesna Langston 65 y.o. female I MRN: [id number]  Unit/Bed#: -01 I Date of Admission: 5/6/2025   Date of Service: 5/13/2025 I Hospital Day: 7     Assessment & Plan  Status post lumbar spinal fusion  S/p B/L navigated revision L3-S1 laminectomy/decompression, revision L3-S1 posterior instrumented spinal fusion with TLIF on 05/1/25  WBAT LLE  LSO brace OOB  F/u Dr Mcrae 2 weeks, scheduled for 5/19 outpatient  Keep dressing intact and incision dry until outpatient post op visit  DVT ppx: eliquis and plavix  Monitor incision  PT/OT evaluate and treat  Acute pain  Tylenol 650 Q6  Oxycodone 5 or 10 mg q4h PRN, wean as possible  Pregabalin   Titrate pain meds as needed  Constipation  Scheduled bowel regimen  Monitor closely for incontinence. Will follow BMs and adjust bowel regimen to target soft, formed stools q1-2 days, per pt's regular schedule.  HFrEF (heart failure with reduced ejection fraction) ([facility])  Wt Readings from Last 3 Encounters:   05/13/25 97.7 kg (215 lb 4.8 oz)   05/06/25 98.5 kg (217 lb 2.5 oz)   04/24/25 94.3 kg (208 lb)     LVEF 20% 3/26/24  Home: jardiance 25, Entresto BID, PRN lAsix  Entresto and Jardiance restarted 5/7  Daily weights  Sodium restrictive diet  Management per IM  CAD (coronary artery disease)  Continuing toprol, Imdur, and lipitor  S/p MI 4/2024, received PCI  T2DM (type 2 diabetes mellitus) ([facility])  Lab Results   Component Value Date    HGBA1C 5.9 (H) 03/28/2025       Recent Labs     05/12/25  2045 05/13/25  0548 05/13/25  1150 05/13/25  1611   POCGLU 138 112 173* 156*       Blood Sugar Average: Last 72 hrs:  (P) 133.4  Home: jardiance, Ozempic (held in acute due to reflux)  Jardiance restarted 5/7  Management per IM  GERD (gastroesophageal reflux disease)  Continuing protonix  Hypothyroid  Continuing levothyroxine  Atrial flutter, paroxysmal (HCC)  Continuing Torpol, Tikosyn BID, and eliquis  Anemia  Baseline hgb 11-12  5/6 hgb 9.0, 5/8 9.9  Monitor  h/h  S/P ICD (internal cardiac defibrillator) procedure  Implanted by Dr Lama 3/27/24 due to scar related VT and ischemic cardiomyopathy  POP (obstructive sleep apnea)  Continuing home CPAP HS  Vitamin D deficiency  Supplementation started by IM    #Disposition: Anticipate discharge home alone on 5/14/25 with HEP, therapy services to be deferred until after follow-up surgical evaluation    History  Vesna Langston is a 65 y.o. female who presented to the [facility] on 5/1 for elective orthopedic surgery due to lumbar radiculopathy. Pain begain 2 years ago after an injury at work. S/p exploration of fusion mass L3-S1, removal of hardware L3-S1, TLIF TLIF, laminotomy/facetectomy, L5-S1; Arthrodesis, T4-5; Arthrodesis, T3-4; Posterior segmental instrumentation/pedicle screw fixation,L3-S1. Admitted to [facility] 5/6 .     Interval: Seen face-to-face at bedside. Voiding spontaneously, continent. Last BM 5/12/25, continent.     Objective :  Temp:  [96.7 °F (35.9 °C)-98.7 °F (37.1 °C)] 98.7 °F (37.1 °C)  HR:  [69-74] 74  BP: (102-132)/(60-86) 132/86  Resp:  [16] 16  SpO2:  [94 %-96 %] 94 %  O2 Device: None (Room air)    Functional Update:  Physical Therapy Occupational Therapy   Weight Bearing Status: Weight Bearing as Tolerated  Transfers: Incidental Touching  Bed Mobility: Incidental Touching  Amulation Distance (ft): 275 feet  Ambulation: Supervision  Assistive Device for Ambulation: Roller Walker  Number of Stairs: 8  Assistive Device for Stairs: Right Hand Rail (R HR and SPC)  Stair Assistance: Incidental Touching  Ramp: Incidental Touching  Assistive Device for Ramp: Roller Walker  Discharge Recommendations: Home with:  DC Home with:: Family Support (f/u therapy services per surgeon input.)   Eating: Independent  Grooming: Independent  Bathing: Independent  Bathing: Independent  Upper Body Dressing: Independent  Lower Body Dressing: Independent  Toileting: Independent  Tub/Shower Transfer:  Independent  Toilet Transfer: Independent  Cognition: Within Defined Limits  Orientation: Person, Place, Time, Situation       GEN: Patient seen resting comfortably in bedside chair, NAD  HEENT: NCAT; mucous membranes moist  CV: No extremity edema  PULM: Breathing comfortably on room air  ABD: Non-distended  SKIN: No obvious rashes or lesions on exposed skin  MSK: LSO brace currently donned.  NEURO:  Awake and alert, answering questions appropriately to context; able to follow multi-step commands with clear consistency  Speech is fluent and organized  CN II-XII grossly intact  Moving all extremities spontaneously in chair    Scheduled Meds:  Current Facility-Administered Medications   Medication Dose Route Frequency Provider Last Rate    acetaminophen  650 mg Oral Q6H MONA Manisha Saldivar PA-C      aluminum-magnesium hydroxide-simethicone  30 mL Oral Q6H PRN Manisha Saldivar PA-C      apixaban  5 mg Oral Q12H Manisha Saldivar PA-C      atorvastatin  80 mg Oral QPM Manisha Saldivar PA-C      bisacodyl  5 mg Oral Once Ashley Depadua, MD      bisacodyl  10 mg Rectal Daily PRN Manisha Saldivar PA-C      calcium carbonate  1,000 mg Oral Daily PRN Manisha Saldivar PA-C      clopidogrel  75 mg Oral Daily Manisha Saldivar PA-C      docusate sodium  100 mg Oral BID Manisha Saldivar PA-C      dofetilide  125 mcg Oral Q12H [facility] Manisha Saldivar PA-C      Empagliflozin  10 mg Oral Daily MAHI Pierce      ergocalciferol  50,000 Units Oral Weekly MAHI Pierce      ferrous gluconate  324 mg Oral Every Other Day MAHI Pierce      fluticasone  1 spray Nasal Daily Manisha Saldivar PA-C      insulin lispro  1-5 Units Subcutaneous HS Manisha Saldivar PA-C      insulin lispro  1-6 Units Subcutaneous TID AC Manisha Saldivar PA-C      isosorbide mononitrate  30 mg Oral BID MAHI Pierce      lactulose  30 g Oral Daily PROC Pool  Yariel, EVON      levothyroxine  75 mcg Oral Daily Manisha Saldivar, EVON      metoprolol succinate  50 mg Oral HS Manisha Saldivar PA-C      ondansetron  4 mg Oral Q6H PRN Manisha Saldivar, EVON      oxyCODONE  10 mg Oral Q4H PRN Manisha Saldivar, EVON      oxyCODONE  5 mg Oral Q4H PRN Manisha Saldivar, EVON      pantoprazole  40 mg Oral BID AC Manisha Saldivar PA-C      polyethylene glycol  17 g Oral Daily PRN Manisha Saldivar, EVON      polyethylene glycol  17 g Oral Daily Ashley Depadua, MD      pregabalin  75 mg Oral TID Manisha Saldivar PA-C      sacubitril-valsartan  1 tablet Oral BID MAHI Pierce      senna  2 tablet Oral Daily Emily Dexter MD         Lab Results: I have reviewed the following results:  Results from last 7 days   Lab Units 05/12/25  0457 05/08/25  0609   HEMOGLOBIN g/dL 10.1* 9.9*   HEMATOCRIT % 33.3* 32.6*   WBC Thousand/uL 9.13 8.43   PLATELETS Thousands/uL 377 286     Results from last 7 days   Lab Units 05/12/25  0457 05/08/25  0609 05/07/25  0602   BUN mg/dL 9 10 11   SODIUM mmol/L 140 138 139   POTASSIUM mmol/L 4.0 4.0 4.1   CHLORIDE mmol/L 105 102 101   CREATININE mg/dL 0.77 0.78 0.81              I have spent a total time of 45 minutes in caring for this patient on the day of the visit/encounter including Patient and family education, Impressions, Counseling / Coordination of care, Documenting in the medical record, Reviewing/placing orders in the medical record (including tests, medications, and/or procedures), Obtaining or reviewing history  , and Communicating with other healthcare professionals .    Hebert Aldrich MD  Attending Physician  Physical Medicine and Rehabilitation  [facility]

## 2025-05-13 NOTE — PROGRESS NOTES
05/13/25 1400   Pain Assessment   Pain Assessment Tool 0-10   Pain Score 4   Pain Location/Orientation Orientation: Lower;Location: Back   Hospital Pain Intervention(s) Cold applied   Restrictions/Precautions   Precautions Spinal precautions;Fall Risk   ROM Restrictions Yes  (back precautions)   Braces or Orthoses LSO   Roll Left and Right   Type of Assistance Needed Independent   Roll Left and Right CARE Score 6   Sit to Lying   Type of Assistance Needed Independent   Sit to Lying CARE Score 6   Lying to Sitting on Side of Bed   Type of Assistance Needed Independent   Lying to Sitting on Side of Bed CARE Score 6   Sit to Stand   Type of Assistance Needed Independent   Comment RW   Sit to Stand CARE Score 6   Bed-Chair Transfer   Type of Assistance Needed Independent   Comment RW   Chair/Bed-to-Chair Transfer CARE Score 6   Car Transfer   Type of Assistance Needed Set-up / clean-up   Comment RW   Car Transfer CARE Score 5   Walk 10 Feet   Type of Assistance Needed Independent   Comment RW   Walk 10 Feet CARE Score 6   Walk 50 Feet with Two Turns   Type of Assistance Needed Independent   Comment RW   Walk 50 Feet with Two Turns CARE Score 6   Walk 150 Feet   Type of Assistance Needed Independent   Comment RW   Walk 150 Feet CARE Score 6   Walking 10 Feet on Uneven Surfaces   Type of Assistance Needed Independent   Comment RW   Walking 10 Feet on Uneven Surfaces CARE Score 6   Ambulation   Primary Mode of Locomotion Prior to Admission Walk   Distance Walked (feet)   (community distance)   Assist Device Roller Walker   Walk Assist Level Independent   Findings Amb up to cafe to get a pepsi, uses RW, no mis step or LOB   Does the patient walk? 2. Yes   Wheel 50 Feet with Two Turns   Reason if not Attempted Activity not applicable   Wheel 50 Feet with Two Turns CARE Score 9   Wheel 150 Feet   Reason if not Attempted Activity not applicable   Wheel 150 Feet CARE Score 9   Curb or Single Stair   Style negotiated Curb    Type of Assistance Needed Independent   Comment up/down 8inch curb step with RW ,  no cues needed, No LOB   1 Step (Curb) CARE Score 6   4 Steps   Type of Assistance Needed Independent   Comment R HR and SPC   4 Steps CARE Score 6   12 Steps   Type of Assistance Needed Independent   Comment R HR and SPC -  slight lack of confidence but no mis step or LOB,  good strength to perform   12 Steps CARE Score 6   Picking Up Object   Type of Assistance Needed Independent   Comment uses reacher   Picking Up Object CARE Score 6   Equipment Use   NuStep L3  12 mins and performed .5miles  performed legs only   Assessment   Treatment Assessment 75 min skilled PT session focused on amb up to cafe for longer distance challenge, pt has good endurance, did educate pt on conserving activity and not over due it at home.  Pt asking about treadmill and recumbent bike  but referred to ask surgeon or wait until she goes to outpatient to try in the the clinic before trying at home.  Pt did well on stairs despite being slightly anxious on them.  Progressed pt to independent with use of RW.  Pt has her own walker for D/C.   Barriers to Discharge None   Plan   Progress Improving as expected   PT Therapy Minutes   PT Time In 1400   PT Time Out 1515   PT Total Time (minutes) 75   PT Mode of treatment - Individual (minutes) 75   PT Mode of treatment - Concurrent (minutes) 0   PT Mode of treatment - Group (minutes) 0   PT Mode of treatment - Co-treat (minutes) 0   PT Mode of Treatment - Total time(minutes) 75 minutes   PT Cumulative Minutes 525   Therapy Time missed   Time missed? No

## 2025-05-13 NOTE — ASSESSMENT & PLAN NOTE
Lab Results   Component Value Date    HGBA1C 5.9 (H) 03/28/2025       Recent Labs     05/12/25  1115 05/12/25  1634 05/12/25  2045 05/13/25  0548   POCGLU 138 103 138 112       Blood Sugar Average: Last 72 hrs:  (P) 128.5793175849249235    Last A1c 5.9 on 3/28/24.  Home regimen: Jardiance 25mg daily and Ozempic 1mg/weekly.  Continue Jardiance 10mg daily (25mg not on formulary).  Resume Ozempic on discharge.  Continue cons. carb diet, QID accuchecks, and SSI.

## 2025-05-13 NOTE — PLAN OF CARE
Problem: RESPIRATORY - ADULT  Goal: Achieves optimal ventilation and oxygenation  Description: INTERVENTIONS:  - Assess for changes in respiratory status  - Assess for changes in mentation and behavior  - Position to facilitate oxygenation and minimize respiratory effort  - Oxygen administered by appropriate delivery if ordered  - Encourage broncho-pulmonary hygiene including cough, deep breathe, Incentive Spirometry  - Assess the need for suctioning and aspirate as needed  - Assess and instruct to report SOB or any respiratory difficulty  Outcome: Progressing     Problem: GASTROINTESTINAL - ADULT  Goal: Maintains or returns to baseline bowel function  Description: INTERVENTIONS:  - Assess bowel function  - Encourage oral fluids to ensure adequate hydration  - Administer ordered medications as needed  - Encourage mobilization and activity  - Consider nutritional services referral to assist patient with adequate nutrition and appropriate food choices  Outcome: Progressing     Problem: MUSCULOSKELETAL - ADULT  Goal: Maintain or return mobility to safest level of function  Description: INTERVENTIONS:  - Assess patient's ability to carry out ADLs; assess patient's baseline for ADL function and identify physical deficits which impact ability to perform ADLs (bathing, care of mouth/teeth, toileting, grooming, dressing, etc.)  - Assess/evaluate cause of self-care deficits   - Assess patient's mobility  - Assess patient's need for assistive devices and provide as appropriate- Encourage maximum independence but intervene and supervise when necessary- Involve family in performance of ADLs  - Assess for home care needs following discharge   - Provide patient education as appropriate  Outcome: Progressing

## 2025-05-13 NOTE — PROGRESS NOTES
Progress Note - Internal Medicine   Name: Vesna Langston 65 y.o. female I MRN: [id number]  Unit/Bed#: -01 I Date of Admission: 5/6/2025   Date of Service: 5/13/2025 I Hospital Day: 7    Assessment & Plan  Status post lumbar spinal fusion  Hx of L3-S1 fusion in 2023 due to work related injury.  Had been experiencing worsening LLE radiculopathy/lower back pain/ambulatory dysfunction.  MRI lumbar spine on 1/28/25 showed multilevel lumbar spondylosis without canal stenosis but there is mild L subarticular zone narrowing and mild bilateral foraminal stenosis at L3-L4 due to L sided disc protrusion at this level.    OR on 5/1 for elective bilateral navigated revision of L3-S1 laminectomy/decompression and revision of L3-S1 posterior instrumented spinal fusion and TLIF with Dr. Mcrae (Ortho/Spine).    LSO brace when OOB.  Neurovascular checks Q shift.  Ensure adequate pain control.  Monitor incision for s/s of infection.    Follow-up with Ortho/Spine as outpatient.  Appt scheduled for 5/19.    Primary team following.  PT/OT.  Atrial flutter, paroxysmal (HCC)  S/p pacemaker.  Continue home Toprol XL 50mg at HS and Tikosyn 125mcg BID for rate control.  Continue Eliquis 5mg BID for anticoagulation.  Monitor routine VS.  Replete electrolytes as needed.  Follows with Dr. Bell (Cardiology) as outpatient.  HFrEF (heart failure with reduced ejection fraction) ([facility])  Wt Readings from Last 3 Encounters:   05/13/25 97.7 kg (215 lb 4.8 oz)   05/06/25 98.5 kg (217 lb 2.5 oz)   04/24/25 94.3 kg (208 lb)     Last ECHO on 10/8/24: EF 40-45%, systolic function moderately reduced, and diastolic function abnormal.  Continue home Toprol XL 50mg at HS.    Continue Jardiance and Entresto.    Had been on Lasix 40mg PRN at home.  Monitor I&Os and daily weights.  Follows with Cardiology (Dr. Bell) as outpatient.  S/P ICD (internal cardiac defibrillator) procedure  Implanted in 03/2024 due to scar related VT and ischemic  cardiomyopathy.  Follow-up with EP as needed as outpatient.  CAD (coronary artery disease)  S/p stent in 03/2024.  Continue home Imdur 30mg BID, Toprol XL 50mg at HS, Lipitor 80mg daily, and Plavix 75mg daily.  Follows with Dr. Bell (Cardiology) as outpatient.  POP (obstructive sleep apnea)  Continue home CPAP at HS.  Follows with Sleep Medicine as outpatient.  Other pulmonary embolism without acute cor pulmonale, unspecified chronicity ([facility])  Hx of PE s/p appendectomy in 05/2024.  Continue home Eliquis 5mg BID.  T2DM (type 2 diabetes mellitus) ([facility])  Lab Results   Component Value Date    HGBA1C 5.9 (H) 03/28/2025       Recent Labs     05/12/25  1115 05/12/25  1634 05/12/25  2045 05/13/25  0548   POCGLU 138 103 138 112       Blood Sugar Average: Last 72 hrs:  (P) 128.6516621404591199    Last A1c 5.9 on 3/28/24.  Home regimen: Jardiance 25mg daily and Ozempic 1mg/weekly.  Continue Jardiance 10mg daily (25mg not on formulary).  Resume Ozempic on discharge.  Continue cons. carb diet, QID accuchecks, and SSI.  Anemia  Hgb currently 10.1.  Hgb 13.5 pre-operatively.  Likely acute blood loss anemia.  Iron panel on 5/8: Iron Sat 7%, TIBC 376, Iron 28, and Ferritin 29.  Start iron supplementation after constipation resolves.  Continue to trend routine CBC.  GERD (gastroesophageal reflux disease)  Stable.  Continue home Protonix 40mg daily.  Follows with Kassi High PA-C (GI) as outpatient.  Hypothyroid  Continue home levothyroxine 75mcg daily.  Last TSH 3.749 on 4/10.  Vitamin D deficiency  Vitamin D level 19.9 on 5/8.  Started on Vitamin D 50,000u weekly.  Continue for at least 8 weeks.  Follow-up with PCP on discharge.    VTE Pharmacologic Prophylaxis:   Pharmacologic: Apixaban (Eliquis)  Mechanical VTE Prophylaxis in Place: Yes - sequential compression devices.    Current Length of Stay: 7 day(s)    Current Patient Status: Inpatient Rehab     Discharge Plan: As per primary team.    Code Status: Level 1 - Full  "Code    Subjective:   Pt examined while pt standing in pt room.  Currently has in-room privileges and has been ambulating around her room without any issues.  States that she feels slightly \"sore\" in the middle of her back.  Still has chronic numbness to the LLE.  Denies any fevers, chills, lightheadedness, or dizziness.  Plans for discharge home tomorrow.  Currently has no concerns or complaints.    Objective:     Vitals:   Temp (24hrs), Av.2 °F (36.2 °C), Min:96.7 °F (35.9 °C), Max:97.8 °F (36.6 °C)    Temp:  [96.7 °F (35.9 °C)-97.8 °F (36.6 °C)] 96.7 °F (35.9 °C)  HR:  [69-77] 70  Resp:  [16] 16  BP: ()/(60-66) 106/66  SpO2:  [94 %-96 %] 94 %  Body mass index is 32.74 kg/m².     Review of Systems   Constitutional:  Negative for appetite change, chills, fatigue and fever.   HENT:  Negative for trouble swallowing.    Eyes:  Negative for visual disturbance.   Respiratory:  Negative for cough, shortness of breath, wheezing and stridor.    Cardiovascular:  Negative for chest pain, palpitations and leg swelling.   Gastrointestinal:  Negative for abdominal distention, abdominal pain, constipation, diarrhea, nausea and vomiting.        LBM    Genitourinary:  Negative for difficulty urinating, dysuria, frequency, hematuria and urgency.   Musculoskeletal:  Positive for back pain (mid level back pain, \"sore\"). Negative for arthralgias and gait problem.   Neurological:  Positive for numbness (chronic numbness to LLE). Negative for dizziness, weakness, light-headedness and headaches.   Psychiatric/Behavioral:  Negative for dysphoric mood and sleep disturbance. The patient is not nervous/anxious.    All other systems reviewed and are negative.       Input and Output Summary (last 24 hours):       Intake/Output Summary (Last 24 hours) at 2025 1132  Last data filed at 2025 0018  Gross per 24 hour   Intake 480 ml   Output 1820 ml   Net -1340 ml       Physical Exam:     Physical Exam  Vitals and nursing " note reviewed.   Constitutional:       General: She is not in acute distress.     Appearance: Normal appearance. She is obese. She is not ill-appearing.   HENT:      Head: Normocephalic and atraumatic.   Cardiovascular:      Rate and Rhythm: Normal rate and regular rhythm.      Pulses: Normal pulses.      Heart sounds: No murmur heard.     No friction rub.   Pulmonary:      Effort: Pulmonary effort is normal. No respiratory distress.      Breath sounds: Normal breath sounds. No wheezing or rhonchi.   Abdominal:      General: Abdomen is flat. Bowel sounds are normal. There is no distension.      Palpations: Abdomen is soft. There is no mass.      Tenderness: There is no abdominal tenderness. There is no guarding or rebound.      Hernia: No hernia is present.   Musculoskeletal:      Cervical back: Normal range of motion and neck supple. No tenderness.      Right lower leg: No edema.      Left lower leg: No edema.      Comments: Wearing LSO brace.   Skin:     General: Skin is warm and dry.   Neurological:      Mental Status: She is alert and oriented to person, place, and time.   Psychiatric:         Mood and Affect: Mood normal.         Behavior: Behavior normal.         Additional Data:     Labs:    Results from last 7 days   Lab Units 05/12/25  0457   WBC Thousand/uL 9.13   HEMOGLOBIN g/dL 10.1*   HEMATOCRIT % 33.3*   PLATELETS Thousands/uL 377   SEGS PCT % 60   LYMPHO PCT % 27   MONO PCT % 9   EOS PCT % 3     Results from last 7 days   Lab Units 05/12/25  0457   SODIUM mmol/L 140   POTASSIUM mmol/L 4.0   CHLORIDE mmol/L 105   CO2 mmol/L 25   BUN mg/dL 9   CREATININE mg/dL 0.77   ANION GAP mmol/L 10   CALCIUM mg/dL 9.0   GLUCOSE RANDOM mg/dL 106         Results from last 7 days   Lab Units 05/13/25  0548 05/12/25  2045 05/12/25  1634 05/12/25  1115 05/12/25  0554 05/11/25  2118 05/11/25  1608 05/11/25  1121 05/11/25  0603 05/10/25  2034 05/10/25  1614 05/10/25  1113   POC GLUCOSE mg/dl 112 138 103 138 123 126 121  157* 128 132 153* 133               Labs reviewed    Imaging:    Imaging reviewed    Recent Cultures (last 7 days):           Last 24 Hours Medication List:   Current Facility-Administered Medications   Medication Dose Route Frequency Provider Last Rate    acetaminophen  650 mg Oral Q6H MONA Manisha Saldivar, EVON      aluminum-magnesium hydroxide-simethicone  30 mL Oral Q6H PRN Manisha Saldivar, EVON      apixaban  5 mg Oral Q12H Manisha Saldivar, PA-C      atorvastatin  80 mg Oral QPM Manisha Saldivar, PA-C      bisacodyl  5 mg Oral Once Ashley Depadua, MD      bisacodyl  10 mg Rectal Daily PRN Manisha Saldivar, EVON      calcium carbonate  1,000 mg Oral Daily PRN Manisha Saldivar, EVON      clopidogrel  75 mg Oral Daily Manisha Saldivar, PA-KARIN      docusate sodium  100 mg Oral BID Manisha Saldivar, PA-C      dofetilide  125 mcg Oral Q12H MONA Manisha Saldivar, EVON      Empagliflozin  10 mg Oral Daily Aldo Talavera, CRKOLE      ergocalciferol  50,000 Units Oral Weekly Aldo Talavera, CRNP      fluticasone  1 spray Nasal Daily Manisha Saldivar, EVON      insulin lispro  1-5 Units Subcutaneous HS Manisha Saldivar, EVON      insulin lispro  1-6 Units Subcutaneous TID AC Manisha Saldivar, PA-C      isosorbide mononitrate  30 mg Oral BID Aldo Talavera, CRNP      lactulose  30 g Oral Daily PRN Manisha Saldivar, PA-C      levothyroxine  75 mcg Oral Daily Manisha Saldivar, PA-C      metoprolol succinate  50 mg Oral HS Manisha Saldivar, EVON      ondansetron  4 mg Oral Q6H PRN Manisha Saldivar, PA-C      oxyCODONE  10 mg Oral Q4H PRN Manisha Saldivar, PA-KARIN      oxyCODONE  5 mg Oral Q4H PRN Manisha Saldivar, PA-C      pantoprazole  40 mg Oral BID AC Manisha Saldivar, EVON      polyethylene glycol  17 g Oral Daily PRN Manisha Saldivar, EVON      polyethylene glycol  17 g Oral Daily Ashley Depadua, MD      pregabalin  75 mg Oral TID Manisha Pool  EVON Saldivar      sacubitril-valsartan  1 tablet Oral BID MAHI Pierce      senna  2 tablet Oral Daily Emily Dexter MD          M*Modal software was used to dictate this note.  It may contain errors with dictating incorrect words or incorrect spelling. Please contact the provider directly with any questions.

## 2025-05-14 VITALS
OXYGEN SATURATION: 97 % | HEART RATE: 78 BPM | BODY MASS INDEX: 32.49 KG/M2 | DIASTOLIC BLOOD PRESSURE: 72 MMHG | SYSTOLIC BLOOD PRESSURE: 128 MMHG | WEIGHT: 214.4 LBS | TEMPERATURE: 98 F | RESPIRATION RATE: 16 BRPM | HEIGHT: 68 IN

## 2025-05-14 LAB
GLUCOSE SERPL-MCNC: 118 MG/DL (ref 65–140)
GLUCOSE SERPL-MCNC: 136 MG/DL (ref 65–140)

## 2025-05-14 PROCEDURE — 97116 GAIT TRAINING THERAPY: CPT

## 2025-05-14 PROCEDURE — 97110 THERAPEUTIC EXERCISES: CPT

## 2025-05-14 PROCEDURE — 99232 SBSQ HOSP IP/OBS MODERATE 35: CPT | Performed by: INTERNAL MEDICINE

## 2025-05-14 PROCEDURE — 99239 HOSP IP/OBS DSCHRG MGMT >30: CPT | Performed by: PHYSICAL MEDICINE & REHABILITATION

## 2025-05-14 PROCEDURE — 82948 REAGENT STRIP/BLOOD GLUCOSE: CPT

## 2025-05-14 RX ORDER — CALCIUM CARBONATE 500 MG/1
1000 TABLET, CHEWABLE ORAL DAILY PRN
Start: 2025-05-14 | End: 2025-05-20 | Stop reason: CLARIF

## 2025-05-14 RX ORDER — SENNOSIDES 8.6 MG
17.2 TABLET ORAL
Start: 2025-05-14 | End: 2025-05-20 | Stop reason: CLARIF

## 2025-05-14 RX ORDER — FERROUS GLUCONATE 324(38)MG
324 TABLET ORAL EVERY OTHER DAY
Qty: 15 TABLET | Refills: 0 | Status: SHIPPED | OUTPATIENT
Start: 2025-05-15 | End: 2025-06-14

## 2025-05-14 RX ORDER — OXYCODONE HYDROCHLORIDE 5 MG/1
5 TABLET ORAL EVERY 6 HOURS PRN
Qty: 12 TABLET | Refills: 0 | Status: SHIPPED | OUTPATIENT
Start: 2025-05-14 | End: 2025-05-17

## 2025-05-14 RX ORDER — POLYETHYLENE GLYCOL 3350 17 G/17G
17 POWDER, FOR SOLUTION ORAL DAILY PRN
Start: 2025-05-14 | End: 2025-05-20 | Stop reason: CLARIF

## 2025-05-14 RX ORDER — ACETAMINOPHEN 325 MG/1
650 TABLET ORAL EVERY 6 HOURS PRN
Start: 2025-05-14

## 2025-05-14 RX ADMIN — DOFETILIDE 125 MCG: 0.12 CAPSULE ORAL at 08:46

## 2025-05-14 RX ADMIN — FLUTICASONE PROPIONATE 1 SPRAY: 50 SPRAY, METERED NASAL at 08:45

## 2025-05-14 RX ADMIN — LEVOTHYROXINE SODIUM 75 MCG: 75 TABLET ORAL at 06:36

## 2025-05-14 RX ADMIN — PANTOPRAZOLE SODIUM 40 MG: 40 TABLET, DELAYED RELEASE ORAL at 06:35

## 2025-05-14 RX ADMIN — ISOSORBIDE MONONITRATE 30 MG: 30 TABLET, EXTENDED RELEASE ORAL at 08:47

## 2025-05-14 RX ADMIN — APIXABAN 5 MG: 5 TABLET, FILM COATED ORAL at 08:47

## 2025-05-14 RX ADMIN — PREGABALIN 75 MG: 75 CAPSULE ORAL at 08:47

## 2025-05-14 RX ADMIN — OXYCODONE HYDROCHLORIDE 5 MG: 5 TABLET ORAL at 12:42

## 2025-05-14 RX ADMIN — EMPAGLIFLOZIN 10 MG: 10 TABLET, FILM COATED ORAL at 08:47

## 2025-05-14 RX ADMIN — OXYCODONE HYDROCHLORIDE 5 MG: 5 TABLET ORAL at 06:36

## 2025-05-14 RX ADMIN — SACUBITRIL AND VALSARTAN 1 TABLET: 24; 26 TABLET, FILM COATED ORAL at 08:47

## 2025-05-14 RX ADMIN — CLOPIDOGREL BISULFATE 75 MG: 75 TABLET, FILM COATED ORAL at 08:47

## 2025-05-14 NOTE — ASSESSMENT & PLAN NOTE
Lab Results   Component Value Date    HGBA1C 5.9 (H) 03/28/2025       Recent Labs     05/13/25  1150 05/13/25  1611 05/13/25  2048 05/14/25  0641   POCGLU 173* 156* 154* 136       Blood Sugar Average: Last 72 hrs:  (P) 135.3808672345116470    Last A1c 5.9 on 3/28/24.  Home regimen: Jardiance 25mg daily and Ozempic 1mg/weekly.  Continue Jardiance 10mg daily (25mg not on formulary).  Resume Ozempic on discharge.  May resume Jardiance 25mg daily on discharge.  Continue cons. carb diet, QID accuchecks, and SSI.

## 2025-05-14 NOTE — NURSING NOTE
This RN reviewed all medications, follow up appointments, spinal precautions, and fall prevention with Vesna before her discharge to home. All belongings are accounted for and were documented. All questions answered. PT has [facility] phone number should she need anything after discharging.

## 2025-05-14 NOTE — ASSESSMENT & PLAN NOTE
Lab Results   Component Value Date    HGBA1C 5.9 (H) 03/28/2025       Recent Labs     05/13/25  1611 05/13/25  2048 05/14/25  0641 05/14/25  1129   POCGLU 156* 154* 136 118       Blood Sugar Average: Last 72 hrs:  (P) 134.5  Home: jardiance, Ozempic  Management per IM

## 2025-05-14 NOTE — PROGRESS NOTES
05/14/25 1030   Pain Assessment   Pain Assessment Tool 0-10   Pain Score 4   Pain Location/Orientation Orientation: Lower;Location: Back   Pain Onset/Description Onset: Ongoing;Frequency: Constant/Continuous;Descriptor: Cramping;Descriptor: Aching   Restrictions/Precautions   Precautions Spinal precautions   Braces or Orthoses LSO   Roll Left and Right   Type of Assistance Needed Independent   Roll Left and Right CARE Score 6   Sit to Lying   Type of Assistance Needed Independent   Sit to Lying CARE Score 6   Lying to Sitting on Side of Bed   Type of Assistance Needed Independent   Lying to Sitting on Side of Bed CARE Score 6   Sit to Stand   Type of Assistance Needed Independent   Sit to Stand CARE Score 6   Bed-Chair Transfer   Type of Assistance Needed Independent   Chair/Bed-to-Chair Transfer CARE Score 6   Walk 10 Feet   Type of Assistance Needed Independent;Adaptive equipment   Comment RW   Walk 10 Feet CARE Score 6   Walk 50 Feet with Two Turns   Type of Assistance Needed Independent;Adaptive equipment   Comment RW   Walk 50 Feet with Two Turns CARE Score 6   Walk 150 Feet   Type of Assistance Needed Independent;Adaptive equipment   Comment RW   Walk 150 Feet CARE Score 6   Curb or Single Stair   Style negotiated Single stair   Type of Assistance Needed Independent   Comment R HR with SPC, non-reciprocal   1 Step (Curb) CARE Score 6   4 Steps   Type of Assistance Needed Independent   Comment R HR with SPC, non-reciprocal   4 Steps CARE Score 6   12 Steps   Type of Assistance Needed Independent   Comment R HR with SPC, non-reciprocal   12 Steps CARE Score 6   Stairs   Findings Practiced FF of stairs reciprocal ascending & descedning with CGA from therapist for first time. No Buckling or LOB. Pt expressing very anxious but happy that her legs were stable to be able. Will continue with non-reciprocal upon returning home.   Equipment Use   NuStep 16 min , o.6 miles l 3 B LE only   Assessment   Treatment  Assessment Pt seen for 30 min to continue with general conditiioning and ongoing stair training to build confidence. Plan to return home today using RW for mobility, HR and cane on stairs.   PT Therapy Minutes   PT Time In 1030   PT Time Out 1100   PT Total Time (minutes) 30   PT Mode of treatment - Individual (minutes) 30   PT Mode of treatment - Concurrent (minutes) 0   PT Mode of treatment - Group (minutes) 0   PT Mode of treatment - Co-treat (minutes) 0   PT Mode of Treatment - Total time(minutes) 30 minutes   PT Cumulative Minutes 555

## 2025-05-14 NOTE — TELEPHONE ENCOUNTER
Caller: Vesna Langston    Doctor: Dr. Bell    Reason for call: Patient scheduled for May 28, 2025 for a follow-up from being in Hospital and her blood pressure keeps dropping. She stated they told her to follow-up with Cardiology once discharged which she is planning on tomorrow. Patient is wondering if she needs to adjust medication prior to being seen. She only wants to see Dr. Bell.    Call back#: [phone]

## 2025-05-14 NOTE — PLAN OF CARE
Problem: GENITOURINARY - ADULT  Goal: Maintains or returns to baseline urinary function  Description: INTERVENTIONS:  - Assess urinary function  - Encourage oral fluids to ensure adequate hydration if ordered  - Administer ordered medications as needed  - Offer frequent toileting  - Follow urinary retention protocol if ordered  Outcome: Progressing     Problem: SKIN/TISSUE INTEGRITY - ADULT  Goal: Skin Integrity remains intact(Skin Breakdown Prevention)  Description: Assess:  -Perform Lanre assessment every shift  -Clean and moisturize skin every shift  -Inspect skin when repositioning, toileting, and assisting with ADLS  -Assess under medical devices such as Allevyns every shift  -Assess extremities for adequate circulation and sensation     Bed Management:  -Have minimal linens on bed & keep smooth, unwrinkled  -Change linens as needed when moist or perspiring  -Avoid sitting or lying in one position for more than two hours while in bed  -Keep HOB at degrees     Toileting:  -Assess for incontinence every two hours  -Use incontinent care products after each incontinent episode such as foam cleanser.     Activity:  -Mobilize patient two times a day  -Encourage activity and walks on unit  -Encourage or provide ROM exercises  -Turn and reposition patient every two Hours  -Use appropriate equipment to lift or move patient in bed  -Instruct/ Assist with weight shifting every two hours when out of bed in chair  -Consider limitation of chair time two hour intervals    Skin Care:  -Avoid use of baby powder, tape, friction and shearing, hot water or constrictive clothing  -Relieve pressure over bony prominences using repositioning   -Do not massage red bony areas    Next Steps:  -Teach patient strategies to minimize risks such as repositioning   -Consider consults to  interdisciplinary teams such as therapy  Outcome: Progressing

## 2025-05-14 NOTE — ASSESSMENT & PLAN NOTE
Wt Readings from Last 3 Encounters:   05/14/25 97.3 kg (214 lb 6.4 oz)   05/06/25 98.5 kg (217 lb 2.5 oz)   04/24/25 94.3 kg (208 lb)     Last ECHO on 10/8/24: EF 40-45%, systolic function moderately reduced, and diastolic function abnormal.  Continue home Toprol XL 50mg at HS.    Continue Jardiance and Entresto.    Had been on Lasix 40mg PRN at home.  Monitor I&Os and daily weights.  Follows with Cardiology (Dr. Bell) as outpatient.

## 2025-05-14 NOTE — ASSESSMENT & PLAN NOTE
Wt Readings from Last 3 Encounters:   05/14/25 97.3 kg (214 lb 6.4 oz)   05/06/25 98.5 kg (217 lb 2.5 oz)   04/24/25 94.3 kg (208 lb)     LVEF 20% 3/26/24  Resume jardiance, Entresto BID at half home dose until follow-up with cardiology (due to soft/low BPs), PRN furosemide  Monitor weights  Sodium restrictive diet  Cardiology follow-up outpatient

## 2025-05-14 NOTE — ASSESSMENT & PLAN NOTE
S/p B/L navigated revision L3-S1 laminectomy/decompression, revision L3-S1 posterior instrumented spinal fusion with TLIF on 05/1/25  WBAT LLE  LSO brace OOB  F/u Dr Mcrae 2 weeks, scheduled for 5/19 outpatient  Keep dressing intact and incision dry until outpatient post op visit  DVT ppx: eliquis and plavix  Monitor incision

## 2025-05-14 NOTE — ASSESSMENT & PLAN NOTE
Hx of L3-S1 fusion in 2023 due to work related injury.  Had been experiencing worsening LLE radiculopathy/lower back pain/ambulatory dysfunction.  MRI lumbar spine on 1/28/25 showed multilevel lumbar spondylosis without canal stenosis but there is mild L subarticular zone narrowing and mild bilateral foraminal stenosis at L3-L4 due to L sided disc protrusion at this level.    OR on 5/1 for elective bilateral navigated revision of L3-S1 laminectomy/decompression and revision of L3-S1 posterior instrumented spinal fusion and TLIF with Dr. Mcrea (Ortho/Spine).    LSO brace when OOB.  Neurovascular checks Q shift.  Ensure adequate pain control.  Monitor incision for s/s of infection.    Follow-up with Ortho/Spine as outpatient.  Appt scheduled for 5/19.    Primary team following.  PT/OT.

## 2025-05-14 NOTE — PROGRESS NOTES
Progress Note - Internal Medicine   Name: Vesna Langston 65 y.o. female I MRN: [id number]  Unit/Bed#: -01 I Date of Admission: 5/6/2025   Date of Service: 5/14/2025 I Hospital Day: 8    Assessment & Plan  Status post lumbar spinal fusion  Hx of L3-S1 fusion in 2023 due to work related injury.  Had been experiencing worsening LLE radiculopathy/lower back pain/ambulatory dysfunction.  MRI lumbar spine on 1/28/25 showed multilevel lumbar spondylosis without canal stenosis but there is mild L subarticular zone narrowing and mild bilateral foraminal stenosis at L3-L4 due to L sided disc protrusion at this level.    OR on 5/1 for elective bilateral navigated revision of L3-S1 laminectomy/decompression and revision of L3-S1 posterior instrumented spinal fusion and TLIF with Dr. Mcrae (Ortho/Spine).    LSO brace when OOB.  Neurovascular checks Q shift.  Ensure adequate pain control.  Monitor incision for s/s of infection.    Follow-up with Ortho/Spine as outpatient.  Appt scheduled for 5/19.    Primary team following.  PT/OT.  Atrial flutter, paroxysmal (HCC)  S/p pacemaker.  Continue home Toprol XL 50mg at HS and Tikosyn 125mcg BID for rate control.  Continue Eliquis 5mg BID for anticoagulation.  Monitor routine VS.  Replete electrolytes as needed.  Follows with Dr. Bell (Cardiology) as outpatient.  HFrEF (heart failure with reduced ejection fraction) ([facility])  Wt Readings from Last 3 Encounters:   05/14/25 97.3 kg (214 lb 6.4 oz)   05/06/25 98.5 kg (217 lb 2.5 oz)   04/24/25 94.3 kg (208 lb)     Last ECHO on 10/8/24: EF 40-45%, systolic function moderately reduced, and diastolic function abnormal.  Continue home Toprol XL 50mg at HS.    Continue Jardiance and Entresto.    Had been on Lasix 40mg PRN at home.  Monitor I&Os and daily weights.  Follows with Cardiology (Dr. Bell) as outpatient.  S/P ICD (internal cardiac defibrillator) procedure  Implanted in 03/2024 due to scar related VT and ischemic  cardiomyopathy.  Follow-up with EP as needed as outpatient.  CAD (coronary artery disease)  S/p stent in 03/2024.  Continue home Imdur 30mg BID, Toprol XL 50mg at HS, Lipitor 80mg daily, and Plavix 75mg daily.  Follows with Dr. Bell (Cardiology) as outpatient.  POP (obstructive sleep apnea)  Continue home CPAP at HS.  Follows with Sleep Medicine as outpatient.  Other pulmonary embolism without acute cor pulmonale, unspecified chronicity (formerly [facility])  Hx of PE s/p appendectomy in 05/2024.  Continue home Eliquis 5mg BID.  T2DM (type 2 diabetes mellitus) (formerly [facility])  Lab Results   Component Value Date    HGBA1C 5.9 (H) 03/28/2025       Recent Labs     05/13/25  1150 05/13/25  1611 05/13/25  2048 05/14/25  0641   POCGLU 173* 156* 154* 136       Blood Sugar Average: Last 72 hrs:  (P) [id number]    Last A1c 5.9 on 3/28/24.  Home regimen: Jardiance 25mg daily and Ozempic 1mg/weekly.  Continue Jardiance 10mg daily (25mg not on formulary).  Resume Ozempic on discharge.  May resume Jardiance 25mg daily on discharge.  Continue cons. carb diet, QID accuchecks, and SSI.  Anemia  Hgb currently 10.1.  Hgb 13.5 pre-operatively.  Likely acute blood loss anemia.  Iron panel on 5/8: Iron Sat 7%, TIBC 376, Iron 28, and Ferritin 29.  Start iron supplementation after constipation resolves.  Continue to trend routine CBC.  GERD (gastroesophageal reflux disease)  Stable.  Continue home Protonix 40mg daily.  Follows with Kassi High PA-C (GI) as outpatient.  Hypothyroid  Continue home levothyroxine 75mcg daily.  Last TSH 3.749 on 4/10.  Vitamin D deficiency  Vitamin D level 19.9 on 5/8.  Started on Vitamin D 50,000u weekly.  Continue for at least 8 weeks.  Follow-up with PCP on discharge.    VTE Pharmacologic Prophylaxis:   Pharmacologic: Apixaban (Eliquis)  Mechanical VTE Prophylaxis in Place: Yes - sequential compression devices.    Current Length of Stay: 8 day(s)    Current Patient Status: Inpatient Rehab     Discharge Plan: As per primary  "team.    Code Status: Level 1 - Full Code    Subjective:   Pt examined while pt sitting in recliner in pt room.  Complaints of mid-lower back spasms.  States that she can not take any muscle relaxants due to her cardiac history so she will just manage with the spasms.  Currently has no \"pain\" to the incision/back.  Had multiple loose stools overnight but had been receiving multiple stool softeners/laxatives due to constipation.  Denies any fevers, chills, abdominal pain, or nausea.  Discussed that she could take Imodium prior to discharge later today.  Currently has no questions or concerns in regards to her discharge.    Objective:     Vitals:   Temp (24hrs), Av.2 °F (36.8 °C), Min:97.8 °F (36.6 °C), Max:98.7 °F (37.1 °C)    Temp:  [97.8 °F (36.6 °C)-98.7 °F (37.1 °C)] 98 °F (36.7 °C)  HR:  [73-78] 78  Resp:  [16] 16  BP: (120-132)/(68-86) 128/72  SpO2:  [94 %-97 %] 97 %  Body mass index is 32.6 kg/m².     Review of Systems   Constitutional:  Negative for appetite change, chills, fatigue and fever.   HENT:  Negative for trouble swallowing.    Eyes:  Negative for visual disturbance.   Respiratory:  Negative for cough, shortness of breath, wheezing and stridor.    Cardiovascular:  Negative for chest pain, palpitations and leg swelling.   Gastrointestinal:  Positive for diarrhea (multiple loose stools overnight). Negative for abdominal distention, abdominal pain, constipation, nausea and vomiting.   Genitourinary:  Negative for difficulty urinating.   Musculoskeletal:  Positive for back pain (mid-lower back spasms, not necessarily pain, currently declines muscle relaxants). Negative for arthralgias and gait problem.   Neurological:  Positive for numbness (chronic LLE numbness). Negative for dizziness, weakness, light-headedness and headaches.   Psychiatric/Behavioral:  Negative for dysphoric mood and sleep disturbance. The patient is not nervous/anxious.    All other systems reviewed and are negative.       Input " and Output Summary (last 24 hours):       Intake/Output Summary (Last 24 hours) at 5/14/2025 0906  Last data filed at 5/14/2025 0849  Gross per 24 hour   Intake 300 ml   Output 1550 ml   Net -1250 ml       Physical Exam:     Physical Exam  Vitals and nursing note reviewed.   Constitutional:       General: She is not in acute distress.     Appearance: Normal appearance. She is obese. She is not ill-appearing.   HENT:      Head: Normocephalic and atraumatic.     Cardiovascular:      Rate and Rhythm: Normal rate and regular rhythm.      Pulses: Normal pulses.      Heart sounds: Normal heart sounds. No murmur heard.     No friction rub.   Pulmonary:      Effort: Pulmonary effort is normal. No respiratory distress.      Breath sounds: Normal breath sounds. No wheezing or rhonchi.   Abdominal:      General: Abdomen is flat. Bowel sounds are normal. There is no distension.      Palpations: Abdomen is soft. There is no mass.      Tenderness: There is no abdominal tenderness. There is no guarding or rebound.      Hernia: No hernia is present.     Musculoskeletal:      Cervical back: Normal range of motion and neck supple. No tenderness.      Right lower leg: No edema.      Left lower leg: No edema.      Comments: Wearing LSO brace     Skin:     General: Skin is warm and dry.     Neurological:      Mental Status: She is alert and oriented to person, place, and time.     Psychiatric:         Mood and Affect: Mood normal.         Behavior: Behavior normal.         Additional Data:     Labs:    Results from last 7 days   Lab Units 05/12/25  0457   WBC Thousand/uL 9.13   HEMOGLOBIN g/dL 10.1*   HEMATOCRIT % 33.3*   PLATELETS Thousands/uL 377   SEGS PCT % 60   LYMPHO PCT % 27   MONO PCT % 9   EOS PCT % 3     Results from last 7 days   Lab Units 05/12/25  0457   SODIUM mmol/L 140   POTASSIUM mmol/L 4.0   CHLORIDE mmol/L 105   CO2 mmol/L 25   BUN mg/dL 9   CREATININE mg/dL 0.77   ANION GAP mmol/L 10   CALCIUM mg/dL 9.0   GLUCOSE  RANDOM mg/dL 106         Results from last 7 days   Lab Units 05/14/25  0641 05/13/25  2048 05/13/25  1611 05/13/25  1150 05/13/25  0548 05/12/25  2045 05/12/25  1634 05/12/25  1115 05/12/25  0554 05/11/25  2118 05/11/25  1608 05/11/25  1121   POC GLUCOSE mg/dl 136 154* 156* 173* 112 138 103 138 123 126 121 157*               Labs reviewed    Imaging:    Imaging reviewed    Recent Cultures (last 7 days):           Last 24 Hours Medication List:   Current Facility-Administered Medications   Medication Dose Route Frequency Provider Last Rate    acetaminophen  650 mg Oral Q6H PRN Hebert Aldrich MD      aluminum-magnesium hydroxide-simethicone  30 mL Oral Q6H PRN Manisha Saldivar PA-C      apixaban  5 mg Oral Q12H Manisha Saldivar PA-C      atorvastatin  80 mg Oral QPM Manisha Saldivar PA-C      bisacodyl  10 mg Rectal Daily PRN Manisha Saldivar PA-C      calcium carbonate  1,000 mg Oral Daily PRN Manisha Saldivar PA-C      clopidogrel  75 mg Oral Daily Manisha Saldivar PA-C      dofetilide  125 mcg Oral Q12H MONA Manisha Saldivar PA-C      Empagliflozin  10 mg Oral Daily MAHI Pierce      ergocalciferol  50,000 Units Oral Weekly MAHI Pierce      ferrous gluconate  324 mg Oral Every Other Day MAHI Pierce      fluticasone  1 spray Nasal Daily Manisha Saldivar PA-C      insulin lispro  1-5 Units Subcutaneous HS Manisha Saldivar PA-C      insulin lispro  1-6 Units Subcutaneous TID AC Manisha Saldivar PA-C      isosorbide mononitrate  30 mg Oral BID Aldo Talavera, MAHI      lactulose  30 g Oral Daily PRN Manisha Saldivar PA-C      levothyroxine  75 mcg Oral Daily Manisha Saldivar PA-C      metoprolol succinate  50 mg Oral HS Manisha Saldivar PA-C      ondansetron  4 mg Oral Q6H PRN Manisha Saldivar PA-C      oxyCODONE  10 mg Oral Q4H PRN Manisha Saldivar PA-C      oxyCODONE  5 mg Oral Q4H PRN Manisha Pool  EVON Saldivar      pantoprazole  40 mg Oral BID AC Manisha Saldivar PA-C      polyethylene glycol  17 g Oral Daily PRN Manisha Saldivar PA-C      polyethylene glycol  17 g Oral Daily Ashley Depadua, MD      pregabalin  75 mg Oral TID Manisha Saldivar PA-C      sacubitril-valsartan  1 tablet Oral BID MAHI Pierce      senna  2 tablet Oral Daily Emily Dexter MD          M*Modal software was used to dictate this note.  It may contain errors with dictating incorrect words or incorrect spelling. Please contact the provider directly with any questions.

## 2025-05-14 NOTE — DISCHARGE SUMMARY
Discharge Summary - PMR   Name: Vesna Langston 65 y.o. female I MRN: [id number]  Unit/Bed#: [facility] 261-01 I Date of Admission: 5/6/2025   Date of Service: 5/14/2025 I Hospital Day: 8    Medical Problems       Resolved Problems  Date Reviewed: 5/14/2025   None       Admission Date: 5/6/2025   Discharge Date: 5/14/2025    Etiologic/Rehabilitation Diagnosis: Impairment of mobility, safety and Activities of Daily Living (ADLs) due to Neurologic Conditions:  03.9  Other Neurologic Disorder Lumbar Radiculopathy     HPI: Vesna Langston is a 65 y.o. female who presented to the [facility] on 5/1 for elective orthopedic surgery due to lumbar radiculopathy. Pain begain 2 years ago after an injury at work. S/p exploration of fusion mass L3-S1, removal of hardware L3-S1, TLIF TLIF, laminotomy/facetectomy, L5-S1; Arthrodesis, T4-5; Arthrodesis, T3-4; Posterior segmental instrumentation/pedicle screw fixation,L3-S1. Admitted to [facility] 5/6 .     Procedures Performed During [facility] Admission: none    Acute Rehabilitation Center Course: Patient participated in a comprehensive interdisciplinary inpatient rehabilitation program which included involvment of MD, therapies (PT, OT, and/or SLP), RN, CM, SW, dietary, and psychology services. She was able to be advanced to a modified independent level of assist and considered safe for discharge home. Please see below for patient's day to day management of medical needs.    Assessment & Plan  Status post lumbar spinal fusion  S/p B/L navigated revision L3-S1 laminectomy/decompression, revision L3-S1 posterior instrumented spinal fusion with TLIF on 05/1/25  WBAT LLE  LSO brace OOB  F/u Dr Mcrae 2 weeks, scheduled for 5/19 outpatient  Keep dressing intact and incision dry until outpatient post op visit  DVT ppx: eliquis and plavix  Monitor incision  Acute pain  Tylenol 650 Q6h PRN  Oxycodone 5 mg q6h PRN for 3 days  Pregabalin   Constipation  Scheduled bowel regimen while on  opioids  Monitor closely for incontinence. Will follow BMs and adjust bowel regimen to target soft, formed stools q1-2 days, per pt's regular schedule.  HFrEF (heart failure with reduced ejection fraction) ([facility])  Wt Readings from Last 3 Encounters:   05/14/25 97.3 kg (214 lb 6.4 oz)   05/06/25 98.5 kg (217 lb 2.5 oz)   04/24/25 94.3 kg (208 lb)     LVEF 20% 3/26/24  Resume jardiance, Entresto BID at half home dose until follow-up with cardiology (due to soft/low BPs), PRN furosemide  Monitor weights  Sodium restrictive diet  Cardiology follow-up outpatient  CAD (coronary artery disease)  Continuing metoprolol, Imdur, and statin  S/p MI 4/2024, received PCI  T2DM (type 2 diabetes mellitus) ([facility])  Lab Results   Component Value Date    HGBA1C 5.9 (H) 03/28/2025       Recent Labs     05/13/25  1611 05/13/25  2048 05/14/25  0641 05/14/25  1129   POCGLU 156* 154* 136 118       Blood Sugar Average: Last 72 hrs:  (P) 134.5  Home: jardiance, Ozempic  Management per IM  GERD (gastroesophageal reflux disease)  Continuing protonix  Hypothyroid  Continuing levothyroxine  Atrial flutter, paroxysmal ([facility])  Continuing metoprolol, Tikosyn BID, and eliquis  Anemia  Baseline hgb 11-12  Monitor h/h  PCP follow-up  S/P ICD (internal cardiac defibrillator) procedure  Implanted by Dr Lama 3/27/24 due to scar related VT and ischemic cardiomyopathy  POP (obstructive sleep apnea)  Continuing home CPAP HS  Vitamin D deficiency  Supplementation started by IM    GEN: Patient seen resting comfortably in bedside chair, NAD  HEENT: NCAT; mucous membranes moist  CV: No extremity edema  PULM: Breathing comfortably on room air  ABD: Non-distended  SKIN: No obvious rashes or lesions on exposed skin  MSK: LSO brace currently donned.  NEURO:  Awake and alert, answering questions appropriately to context; able to follow multi-step commands with clear consistency  Speech is fluent and organized  CN II-XII grossly intact  Moving all extremities  spontaneously in chair    Significant Findings, Care, Treatment and Services Provided: N/A    Complications: None    Functional Status Upon Admission to [facility]:    ADLs  Eatin: Not applicable  Oral hygiene: 05: Setup or clean-up assistance  Shower/bathing self: 03: Partial/moderate assistance  Upper body dressin: Supervision or touching  assistance  Lower body dressin: Partial/moderate assistance  Putting on/taking off footwear: 03: Partial/moderate assistance  Toilet hygiene: 04: Supervision or touching  assistance     Transfers  Roll left and right: 04: Supervision or touching  assistance  Sit to lyin: Not applicable  Lying to sitting on side of bed: 04: Supervision or touching  assistance  Sit to stand: 04: Supervision or touching  assistance  Chair/bed to chair transfer: 04: Supervision or touching  assistance  Toilet transfer: 04: Supervision or touching  assistance    Mobility  Walk 10 ft: 03: Partial/moderate assistance  Walk 50 ft with two turns: 10: Not attempted due to environmental limitations  Walk 150ft: 03: Partial/moderate assistance    Functional Status Upon Discharge from [facility]:     Physical Therapy Occupational Therapy   Weight Bearing Status: Weight Bearing as Tolerated  Transfers: Independent  Bed Mobility: Independent  Amulation Distance (ft): >1,000 ft (community distance)  Ambulation: Independent  Assistive Device for Ambulation: Roller Walker  Number of Stairs: 12  Assistive Device for Stairs: Right Hand Rail (R HR and SPC)  Stair Assistance: Independent   Eating: Independent  Grooming: Independent  Bathing: Independent  Upper Body Dressing: Independent  Lower Body Dressing: Independent  Toileting: Independent  Tub/Shower Transfer: Independent  Toilet Transfer: Independent  Cognition: Within Defined Limits  Orientation: Person, Place, Time, Situation       Discharge Diagnosis: Impairment of mobility, safety and Activities of Daily Living (ADLs) due to Neurologic Conditions:   03.9  Other Neurologic Disorder Lumbar radiculopathy    Discharge Medications:   See after visit summary for reconciled discharge medications provided to patient and family.      Condition at Discharge: good     Discharge instructions/Information to patient and family:   See after visit summary for information provided to patient and family.      Provisions for Follow-Up Care:  See after visit summary for information related to follow-up care and any pertinent home health orders.      Future Appointments   Date Time Provider Department Center   5/19/2025  8:30 AM Charles Mcrae MD ORTHO [facility]   5/28/2025  4:40 PM Vitor Bell MD CARD  Practice-[facility]   5/30/2025 11:00 AM OW MAMMO MOB 1 OW MOB RYAN OW MOB   6/9/2025  9:00 AM OW CT 1 OW CT [facility]   7/8/2025 11:40 AM Vitor Bell MD CARD [facility]   7/10/2025 11:00 AM Genaro Caceres DO [place] Sleep Med [facility]   8/1/2025  8:00 AM Jeffy Lama MD CARD [facility]   8/1/2025  8:30 AM DEVICE IN PERSON [place] CARD [facility]   8/18/2025 10:30 AM Kassi High PA-C [facility] CV Practice-Med   11/4/2025 12:00 PM DEVICE REMOTE [place] CARD [facility]   11/17/2025  8:00 AM DO JENNI [facility] PCP [place]     Disposition: Home    Planned Readmission: No    Discharge Statement   I have spent a total time of 40 minutes in caring for this patient on the day of the visit/encounter. >30 minutes of time was spent on: Prognosis, Risks and benefits of tx options, Instructions for management, Patient and family education, Impressions, Counseling / Coordination of care, Documenting in the medical record, Reviewing / ordering tests, medicine, procedures  , and Communicating with other healthcare professionals .    Discharge Medications:  See after visit summary for reconciled discharge medications provided to patient and family.      Hebert Aldrich MD  Attending Physician  Physical Medicine and Rehabilitation  [facility]

## 2025-05-14 NOTE — DISCHARGE INSTR - AVS FIRST PAGE
DISCHARGE INSTRUCTIONS: [facility] ACUTE REHABILITATION [place]    Bring these instructions with you to your Outpatient Physician appointments so they can order and follow-up any additional lab work or imaging recommended at time of discharge.    Resume follow-up with all your prior providers that you have established care prior to this hospitalization.  Discuss with primary care physician (PCP) if you have additional questions.     It  is you or your caregivers responsibility to obtain follow-up MEDICATION REFILLS  As indicated through your Primary Care Physician (PCP) and other outpatient specialty provider(s) after discharge.  Please follow-up with your PCP as soon as possible after discharge to set-up follow-up management and when appropriate refills.      You remain a fall and injury risk which could be severe.  - Your risk of fall has decreased however since admission to acute rehab.  Caregiver training has been completed with our staff.  - Appropriate supervision +/- assistance as instructed during your rehab course is recommended to decrease risk of fall and injury.    - Continue skilled therapy as discussed after discharge to further decrease this risk    If you (or your health care proxy) have any questions or concerns regarding your acute rehabilitation stay including issues with medications, rehabilitation, and follow-up plan, please call:          [facility] Acute Rehabilitation Unit at [place] at [phone]    Should you develop fevers, chills, new weakness, changes in sensation, difficulty speaking, facial weakness, confusion, shortness of breath, chest pain, or other concerning symptoms please call 911 and/or obtain transportation to nearest ER immediately.    Should you develop worsening pain, swelling, or drainage notify your surgeon right away or obtain transportation to nearest ER for evaluation.    Should you develop feelings of significant hopelessness, severe  "depression, severe anxiety, or suicide you should call 911 or obtain transportation to nearest ER.    24-7 [employer]wide suicide and crisis lifeline call \"112\"  National Suicide Prevention Lifeline is 1-489.800.9812 and is available 24 hrs/7 days a week.   [facility] Crisis [phone] is available 24 hrs/7 days for mental health  [facility] Crisis [phone] is available 24 hrs/7 days for mental health   [facility] Crisis [phone]    PHYSICIANS to see:  Please see your doctors listed in the follow up providers section of your discharge paperwork, and take the discharge paperwork with you to your appointments.    SKIN CARE INSTRUCTIONS to follow:  Monitor incision(s) for increased redness, swelling, pain/tenderness, discharge/pus and promptly notify your surgeon should these develop.  - Should you develop significant pain, swelling, or drainage obtain transportation to nearest emergency room for immediate evaluation if unable to reach your surgeon promptly   - Should you develop uncontrolled pain, fever, chills, sweats, changes in strength, sensation, or color of this area obtain transportation to nearest emergency room for immediate evaluation.      Monitor skin for increased redness or breadown and promptly notify your physician should these develop    If instructed while in ARC - be sure you stand +/- walk every 1-2 hours and if advised use appropriate supervision/assistance to optimally offload your buttock/sacral region.  While seated or lying in bed shift positions and from side to side often.  Can use barrier type cream such as Hydragaurd 2 times per day and as needed.    Turn patient (yourself) fully every 2 hours while in bed.      Due to the following you are at increased risk of skin breakdown/wounds/worsening wounds:  - Impaired mobility  - Impaired nutrition/intake/low albumin  - Medical co-morbidities    Buttocks/Sacrum  Turn as full as possible off " sacrum/buttocks every 2 hours  Use Cushion while in chair or wheelchair  Weight shift every 10-15 minutes while in chair  Keep skin clean and dry as possible.  Remove wet or soiled clothing/linens promptly  Monitor skin for increased breakdown which you are at risk of and notify nursing, PCP, or other physician providers should this occur right away    Heels/bony edges of feet  Float heels off edge of pillow for added pressure relief.  Monitor heels and bony protuberances and notify physician or nursing for any increased redness, bogginess, or breakdown    Keep your incision dressing in place until your follow-up with your spine surgeon, Dr. Mcrae.     WEIGHTBEARING/ACTIVITY PRECAUTIONS to follow:      Weightbearing as tolerated.    Lumbar spinal precautions. Wear your LSO (back brace) while out of bed.     No pushing/pulling/lifting greater than 5-10 lbs until cleared by your surgeons.     Driving restrictions:    You are recommended against driving until cleared by an outpatient physician.      **As outlined in [facility] law, healthcare personnel are required to report every person over 15 years of age diagnosed as having a condition that could impair their ability to drive, with the exception of medical condition expected to last less than 90 days (most commonly orthopedic fractures and post orthopedic surgery conditions without other co-morbidities).  Your medical condition hopefully will have adequately improved by that time but at this time it is not certain.      **You should NOT operate a motor vehicle while under the influence of an opiate medication, muscle relaxant, or other sedative.  Doing so may lead to an accident resulting in serious injury or death to yourself or others.  You have agreed to avoid driving when under the influence of this medication.      Work restrictions:  You should NOT return to work (if working) until cleared by an outpatient physician.      Alcohol restrictions:  You  are recommended to not drink alcohol at this time unless cleared by an outpatient physician.  Drinking alcohol in your current functional condition can increase your risk of injury which could be severe.  Drinking alcohol given your current health problems can lead to increased medical complications which could be severe.    Combining alcohol with your current medications can increase your risk of injury which could be severe.      Smoking restrictions:  You are recommended to not smoke nicotine.  Smoking increases your risk of heart attack, stroke, emphysema/COPD, and lung cancer.      MEDICATIONS:  Please see a full list of your medications outlined in the After Visit Summary that is attached to these Discharge Instructions.  Please note changes may have been made to your medications please refer to your discharge paperwork for your current medications and take this list with you to all your doctors appointments for your doctors to review.  Please do not resume a home medication unless the medication reconciliation sheet indicates to do so, please do not assume that a medication that you were given a prescription for is the same as a medication you have at home based on both medications having the same name as dosages and frequency may have changed.      Unless specifically noted in your medication list provided to you in your discharge paper work do not resume prior vitamins, minerals, or supplements you may have been taking prior to your hospitalization unless instructed by an outpatient physician in the future.  Discuss with your primary care at next visit if applicable.      Sedating Medications with increased risk of complications:    Your goal will be to wean off of opiate medications and then onto acetaminophen only and then off of acetaminophen as well if possible.    There are risks associated with opioid medications, including dependence, addiction and tolerance. The patient understands and agrees to use  these medications only as prescribed. Potential side effects of the medications include, but are not limited to, constipation, drowsiness, addiction, impaired judgment and risk of fatal overdose if not taken as prescribed. You should not drive after taking this medication.  The patient was warned against driving while taking sedation medications.  Sharing medications is a felony. At this point in time, the patient is showing no signs of addiction, abuse, diversion or suicidal ideation.    Oxycodone (opiate) pain medication has been used to help your acute pain.  You tolerated this medication adequately during your recent hospital stay.        You will be given a limited supply of opiate pain medications on discharge; should you require additional refills/medications, your PCP and/or surgeon may prescribe at his/her discretion.   This can be quite helpful particularly for severe acute pain.      - Follow-up with your primary care physician within 1-2 weeks as well as with your spine surgeon for additional management of your medical conditions, potential refills, or adjustments of this medication.        - If pain not adequately controlled with primary providers after discharge you may request referral or request appointment to outpatient pain management specialist such as:     [facility]   [email or web address]  [phone] ([phone])    MEDICAL MANAGEMENT AT HOME specific to you:    Urinary retention risk:  You may have had or are at risk for urinary retention (bladder filling up with excessive urine and inability to adequately expel it).  Urinary retention can lead to significant kidney injury and infection which can be serious.  If you do not urinate for 8 hours or more or you have the urge to urinate and are unable to, please obtain transportation to nearest emergency room (or urologist office), for likely placement of alaniz.  If you develop fever, chills, sweats,  confusion, or other concerning symptoms seek medical management right away.  Follow-up with primary care and if needed urology after discharge.    Acetaminophen (Tylenol) Dosing Warning:    You may have up to 3000 mg of acetaminophen (Tylenol) from all sources spread out over a 24 hours period.  Do not have more than that, as this can increase your risk of liver injury which can be serious.      Bowel management (constipation risk):  - Ideally you would have 1 well formed BM every 1-2 days.  Adjust bowel regimen based on that goal or as close to that as possible  - Start with taking miralax 1 time per day and senna twice daily if you become constipated   - If still constipated you can increase miralax to twice per day and if needed take either oral or by rectum dulcolax (but not at the same time)  - If unable to go for more than 4 days notify your physician for additional recommendations    - If you develop significant abdominal pain, nausea/vomiting, or other concerns seek medical attention right away.      For pain - try to use over the counter topicals (creams/gels) as discussed or acetaminophen 1-2 regular or extra-strength Tylenol up to 2-3 times per day.  Could consider heat or ice as well maximum 20 minutes at a time.  Do not use heat or ice if using topicals at the same time.  Notify primary care and/or orthopedics for poorly controlled pain for additional recommendations.      NSAID Warning:  Please avoid NSAID (including but not limited to advil, aleve, motrin, naproxen, ibuprofen, mobic, meloxicam, diclofenac etc) medications as NSAID medications may increase your risk of bleeding (which can be life-threatening), stroke, heart attack, developing/worsening GI ulcers, worsening heart failure, potentially delay bone healing.      Please note a summary of your hospital stay with relevant information for your doctors will try to be sent to them.  Please confirm with your doctors at your follow up visits that  they have received this summary and have them contact [facility] if they have not received them along with any other medical records they may require.

## 2025-05-14 NOTE — PLAN OF CARE
Problem: NEUROSENSORY - ADULT  Goal: Achieves stable or improved neurological status  Description: INTERVENTIONS  - Monitor and report changes in neurological status  - Monitor vital signs such as temperature, blood pressure, glucose, and any other labs ordered     Outcome: Completed     Problem: CARDIOVASCULAR - ADULT  Goal: Maintains optimal cardiac output and hemodynamic stability  Description: INTERVENTIONS:  - Monitor I/O, vital signs and rhythm  - Monitor for S/S and trends of decreased cardiac output  -  Assess quality of pulses, skin color and temperature  - Assess for signs of decreased coronary artery perfusion- Instruct patient to report change in severity of symptoms  Outcome: Completed     Problem: RESPIRATORY - ADULT  Goal: Achieves optimal ventilation and oxygenation  Description: INTERVENTIONS:  - Assess for changes in respiratory status  - Assess for changes in mentation and behavior  - Position to facilitate oxygenation and minimize respiratory effort  - Oxygen administered by appropriate delivery if ordered  - Encourage broncho-pulmonary hygiene including cough, deep breathe, Incentive Spirometry  - Assess the need for suctioning and aspirate as needed  - Assess and instruct to report SOB or any respiratory difficulty  Outcome: Completed     Problem: GASTROINTESTINAL - ADULT  Goal: Maintains or returns to baseline bowel function  Description: INTERVENTIONS:  - Assess bowel function  - Encourage oral fluids to ensure adequate hydration  - Administer ordered medications as needed  - Encourage mobilization and activity  - Consider nutritional services referral to assist patient with adequate nutrition and appropriate food choices  Outcome: Completed  Goal: Maintains adequate nutritional intake  Description: INTERVENTIONS:  - Monitor percentage of each meal consumed  - Identify factors contributing to decreased intake, treat as appropriate  - Assist with meals as needed  - Monitor I&O, weight, and  lab values if indicated  - Obtain nutrition services referral as needed  Outcome: Completed     Problem: GENITOURINARY - ADULT  Goal: Maintains or returns to baseline urinary function  Description: INTERVENTIONS:  - Assess urinary function  - Encourage oral fluids to ensure adequate hydration if ordered  - Administer ordered medications as needed  - Offer frequent toileting  - Follow urinary retention protocol if ordered  Outcome: Completed  Goal: Absence of urinary retention  Description: INTERVENTIONS:  - Assess patient's ability to void and empty bladder   Outcome: Completed     Problem: METABOLIC, FLUID AND ELECTROLYTES - ADULT  Goal: Electrolytes maintained within normal limits  Description: INTERVENTIONS:  - Monitor labs and assess patient for signs and symptoms of electrolyte imbalances  - Administer electrolyte replacement as ordered  - Monitor response to electrolyte replacements, including repeat lab results as appropriate  - Instruct patient on fluid and nutrition as appropriate  Outcome: Completed  Goal: Fluid balance maintained  Description: INTERVENTIONS:  - Monitor labs   - Monitor I/O and WT  - Instruct patient on fluid and nutrition as appropriate  - Assess for signs & symptoms of volume excess or deficit  Outcome: Completed  Goal: Glucose maintained within target range  Description: INTERVENTIONS:  - Monitor Blood Glucose as ordered  - Assess for signs and symptoms of hyperglycemia and hypoglycemia  - Administer ordered medications to maintain glucose within target range  - Assess nutritional intake and initiate nutrition service referral as needed  Outcome: Completed     Problem: SKIN/TISSUE INTEGRITY - ADULT  Goal: Skin Integrity remains intact(Skin Breakdown Prevention)  Description: Assess:  -Perform Lanre assessment every shift  -Clean and moisturize skin every shift  -Inspect skin when repositioning, toileting, and assisting with ADLS  -Assess under medical devices such as Allevyns every  shift  -Assess extremities for adequate circulation and sensation     Bed Management:  -Have minimal linens on bed & keep smooth, unwrinkled  -Change linens as needed when moist or perspiring  -Avoid sitting or lying in one position for more than two hours while in bed  -Keep HOB at degrees     Toileting:  -Assess for incontinence every two hours  -Use incontinent care products after each incontinent episode such as foam cleanser.     Activity:  -Mobilize patient two times a day  -Encourage activity and walks on unit  -Encourage or provide ROM exercises  -Turn and reposition patient every two Hours  -Use appropriate equipment to lift or move patient in bed  -Instruct/ Assist with weight shifting every two hours when out of bed in chair  -Consider limitation of chair time two hour intervals    Skin Care:  -Avoid use of baby powder, tape, friction and shearing, hot water or constrictive clothing  -Relieve pressure over bony prominences using repositioning   -Do not massage red bony areas    Next Steps:  -Teach patient strategies to minimize risks such as repositioning   -Consider consults to  interdisciplinary teams such as therapy  Outcome: Completed  Goal: Incision(s), wounds(s) or drain site(s) healing without S/S of infection  Description: INTERVENTIONS  - Assess and document dressing, incision, wound bed, drain sites and surrounding tissue  - Provide patient and family education- Perform skin care/dressing changes every shift.  Outcome: Completed     Problem: HEMATOLOGIC - ADULT  Goal: Maintains hematologic stability  Description: INTERVENTIONS  - Assess for signs and symptoms of bleeding or hemorrhage  - Monitor labs- Administer supportive blood products/factors as ordered and appropriate  Outcome: Completed     Problem: MUSCULOSKELETAL - ADULT  Goal: Maintain or return mobility to safest level of function  Description: INTERVENTIONS:  - Assess patient's ability to carry out ADLs; assess patient's baseline for  ADL function and identify physical deficits which impact ability to perform ADLs (bathing, care of mouth/teeth, toileting, grooming, dressing, etc.)  - Assess/evaluate cause of self-care deficits   - Assess patient's mobility  - Assess patient's need for assistive devices and provide as appropriate- Encourage maximum independence but intervene and supervise when necessary- Involve family in performance of ADLs  - Assess for home care needs following discharge   - Provide patient education as appropriate  Outcome: Completed  Goal: Maintain proper alignment of affected body part  Description: INTERVENTIONS:  - Support, maintain and protect limb and body alignment  - Provide patient/ family with appropriate education  Outcome: Completed

## 2025-05-14 NOTE — ASSESSMENT & PLAN NOTE
Scheduled bowel regimen while on opioids  Monitor closely for incontinence. Will follow BMs and adjust bowel regimen to target soft, formed stools q1-2 days, per pt's regular schedule.

## 2025-05-15 ENCOUNTER — TELEPHONE (OUTPATIENT)
Age: 65
End: 2025-05-15

## 2025-05-15 ENCOUNTER — TRANSITIONAL CARE MANAGEMENT (OUTPATIENT)
Dept: FAMILY MEDICINE CLINIC | Facility: CLINIC | Age: 65
End: 2025-05-15

## 2025-05-15 NOTE — PROGRESS NOTES
05/15/25 1140   Hello, [Guardian’s Name / Patient’s Name], this is [Caller Name] from [facility], and our clinical care team wanted to check on you / your child after your recent visit to the hospital. It will only take 3-5 minutes. Is this a good time?   Discharge Call Type/ Specific Diagnosis: ARC General   ARC Discharge Follow- Up   ARC Follow- Up Time Frame 5 Day follow up   Call Complete   Attempted Number of Calls 1   Discharge phone call complete? Left Message

## 2025-05-15 NOTE — TELEPHONE ENCOUNTER
Patient calling regarding a back surgery she just had, which she's been experiencing severe diarrhea post-op. She wants to check in with cardiology that it is safe for her to take something like imodium to help with her symptoms. Please advise.

## 2025-05-16 ENCOUNTER — TELEPHONE (OUTPATIENT)
Age: 65
End: 2025-05-16

## 2025-05-16 NOTE — OCCUPATIONAL THERAPY NOTE
OT DISCHARGE SUMMARY    Pt made good progress during stay on the ARC following admission for s/p lumbar spinal fusion.  Pt presented upon IE with generalized weakness, decreased endurance, activity tolerance, and functional mobility. On evaluation, pt required Josey to complete all ADLs and functional transfers. Pt was discharged to home with dtr support. Pt currently functioning at [place] level for ADL, [place] level for transfers with RW. The following DME was recommended shower chair, BSC, hip kit, folding walker tray. Family training no necessary during stay, pt Is [place]. Pt has met all OT goals, no further services warranted at this time.       -Stella Swanson MS, OTR/L, CSRS

## 2025-05-16 NOTE — TELEPHONE ENCOUNTER
Caller: Natasha - Dr. Ko office    Doctor/Office: Dr Mcrae    CB#: [phone]      What needs to be faxed: Last OVN note and OP report    ATTN to: Lorna    Fax#: [phone]      Documents were successfully e-faxed

## 2025-05-18 DIAGNOSIS — K25.9 MULTIPLE GASTRIC ULCERS: ICD-10-CM

## 2025-05-18 RX ORDER — PANTOPRAZOLE SODIUM 40 MG/1
40 TABLET, DELAYED RELEASE ORAL
Qty: 180 TABLET | Refills: 1 | Status: SHIPPED | OUTPATIENT
Start: 2025-05-18

## 2025-05-19 ENCOUNTER — HOSPITAL ENCOUNTER (OUTPATIENT)
Dept: RADIOLOGY | Facility: HOSPITAL | Age: 65
Discharge: HOME | End: 2025-05-19
Attending: ORTHOPAEDIC SURGERY
Payer: COMMERCIAL

## 2025-05-19 ENCOUNTER — OFFICE VISIT (OUTPATIENT)
Dept: OBGYN CLINIC | Facility: HOSPITAL | Age: 65
End: 2025-05-19

## 2025-05-19 VITALS — HEIGHT: 68 IN | BODY MASS INDEX: 32.51 KG/M2 | WEIGHT: 214.51 LBS

## 2025-05-19 DIAGNOSIS — Z98.1 S/P LUMBAR FUSION: Primary | ICD-10-CM

## 2025-05-19 DIAGNOSIS — Z98.1 S/P LUMBAR FUSION: ICD-10-CM

## 2025-05-19 PROCEDURE — 99024 POSTOP FOLLOW-UP VISIT: CPT | Performed by: ORTHOPAEDIC SURGERY

## 2025-05-19 PROCEDURE — 72100 X-RAY EXAM L-S SPINE 2/3 VWS: CPT

## 2025-05-19 RX ORDER — HYDROCODONE BITARTRATE AND ACETAMINOPHEN 5; 325 MG/1; MG/1
1 TABLET ORAL EVERY 8 HOURS PRN
Qty: 30 TABLET | Refills: 0 | Status: SHIPPED | OUTPATIENT
Start: 2025-05-19

## 2025-05-19 NOTE — PROGRESS NOTES
Name: Vesna Langston      : 1960       MRN: [id number]   Encounter Provider: Charles Mcrae MD   Encounter Date: 25  Encounter department: [facility] ORTHOPEDIC SPINE SURGERY  POST OP NOTE     Vesna Langston  here today s/p navigated revision L3-S1 laminectomy/decompression, revision L3-S1 posterior instrumented spinal fusion with TLIF          Surgery date: 2025    Assessment & Plan  S/P lumbar fusion  See plan below    Orders:    XR spine lumbar 2 or 3 views injury; Future    Ambulatory referral to Physical Therapy; Future    HYDROcodone-acetaminophen (NORCO) 5-325 mg per tablet; Take 1 tablet by mouth every 8 (eight) hours as needed for pain May cause drowsiness and dizziness. This medication does contain tylenol/acetaminophen, so will need to limit any supplemental tylenol intake. Do not exceed 3000 mg (3g) of tylenol/acetaminophen every 24 hours. Max Daily Amount: 3 tablets         Plan:  Staples were removed.  Patient was instructed to do the following   -Able to walk as much as tolerated. Continue to use assistive ambulatory device as indicated. LSO brace when ambulating.  -Continue to limit excessive turning, twisting, bending. No lifting >5lbs.  -Will plan to start physical therapy to work on upper body strengthening. Continue to maintain lumbar restrictions. No lifting >5lbs.  -May shower and allow water/soap to run over the incision. Do not scrub or submerge incision.  -Able to drive as long as no longer taking narcotics.   -Take pain medication as prescribed if needed.  Refill of Norco provided. Patient reports taking Norco in the past with benefit and without unfavorable side effects. [place] Prescription Drug Monitoring Program report was queried, reviewed and deemed appropriate. She should not take oxycodone while taking Norco.  -Note provided in chart  -Follow up in 4 weeks for 6 week post-op visit. Will obtain X-rays  Vesna Langston  "was instructed to call the office with any concerns or questions.      History of Present Illness    Subjective: Preoperative symptoms were back, lower extremity pain, ambulatory dysfunction. Today, she reports improvement in pre-operative symptoms. Pain overall well controlled. Does have some back soreness. She reports lower extremity symptoms have resolved. She is currently residing at her daughters house. No longer in rehab facility. She does report doing upper body strengthening while in the rehab with benefit. Does continue with some weakness and left lower extremity numbness. She feels like she is able to stand up straighter and for longer periods of time, about 10-15 minutes. Walking has improved, she has been ambulating around the store without issue. Wearing LSO brace and using walker for ambulation. Patient admits to compliance with activity restrictions.  Denies any fevers, chills, and night sweats.  No cough, no shortness of breath. No chest pain, no palpitations.  No dysphagia.    Post-Op Medications:  5mg oxycodone - take as needed  75mg tid Lyrica - taking as prescribed  Resumed plavix and  eliquis as prescribed      Physical Exam    Objective:  Ht 5' 8\" (1.727 m)   Wt 97.3 kg (214 lb 8.1 oz)   BMI 32.62 kg/m²     Strength:  Lower Extremity Motor Function     Right  Left    Iliopsoas  5/5  4/5    Quadriceps 5/5 4/5   Tibialis anterior  5/5  4/5    EHL  5/5  3+/5    Gastroc. muscle  5/5  2/5    Heel rise  5/5  2/5    Toe rise  5/5  2/5      Sensation: intact, diminished  DTR: intact  Gait: fluid, non-antalgic. Using walker and cane for ambulation    Posterior lumbar incision healing extremely well. Staples intact. No signs of erythema, discharge, or purulence.  There is no appreciable effusion.    Calf: soft, non tender, no swelling or erythema     Imaging:  I have reviewed and independently visualized the imaging studies (05/19/25)  myself and my interpretation is the following: Instrumentation in " place and in good alignment.

## 2025-05-19 NOTE — LETTER
May 19, 2025     Patient: Vesna Langston  YOB: 1960  Date of Visit: 5/19/2025      To Whom it May Concern:    Vesna Langston is under my professional care. Vesna was seen in my office on 5/19/2025. Vesna is 2 weeks post-op from a lumbar fusion surgery. Will plan to start physical therapy to work on upper body strengthening. Continue to maintain lumbar spine restrictions and no lifting >5 lbs. Vesna will follow up in 4 weeks on 6/16/2025 for 6 week post-op visit.    If you have any questions or concerns, please don't hesitate to call.         Sincerely,          Charles Mcrae MD        CC: No Recipients

## 2025-05-19 NOTE — PROGRESS NOTES
05/16/25 0955   Hello, [Guardian’s Name / Patient’s Name], this is [Caller Name] from [facility], and our clinical care team wanted to check on you / your child after your recent visit to the hospital. It will only take 3-5 minutes. Is this a good time?   Discharge Call Type/ Specific Diagnosis: ARC General   ARC Discharge Follow- Up   ARC Follow- Up Time Frame 5 Day follow up   Call Complete   Attempted Number of Calls 2   Discharge phone call complete? Left Message

## 2025-05-20 RX ORDER — SACUBITRIL AND VALSARTAN 49; 51 MG/1; MG/1
TABLET, FILM COATED ORAL
COMMUNITY
Start: 2025-05-17

## 2025-05-20 NOTE — CASE MANAGEMENT
Case Management      Patient made good progress during acute rehab stay. Patient had private transport set up through [employer]. Patient discharged home to [facility] until follow up appointment. Patient will required no follow up services. Ordered walker tray, hip kit and commode for discharge.

## 2025-05-21 ENCOUNTER — OFFICE VISIT (OUTPATIENT)
Dept: CARDIOLOGY CLINIC | Facility: CLINIC | Age: 65
End: 2025-05-21
Payer: COMMERCIAL

## 2025-05-21 VITALS
OXYGEN SATURATION: 97 % | DIASTOLIC BLOOD PRESSURE: 60 MMHG | BODY MASS INDEX: 32.28 KG/M2 | WEIGHT: 213 LBS | SYSTOLIC BLOOD PRESSURE: 108 MMHG | HEART RATE: 80 BPM | HEIGHT: 68 IN

## 2025-05-21 DIAGNOSIS — I47.20 VENTRICULAR TACHYCARDIA (HCC): ICD-10-CM

## 2025-05-21 DIAGNOSIS — I48.92 ATRIAL FLUTTER, PAROXYSMAL (HCC): ICD-10-CM

## 2025-05-21 DIAGNOSIS — I25.5 ISCHEMIC CARDIOMYOPATHY: Primary | ICD-10-CM

## 2025-05-21 DIAGNOSIS — I25.118 CORONARY ARTERY DISEASE OF NATIVE HEART WITH STABLE ANGINA PECTORIS, UNSPECIFIED VESSEL OR LESION TYPE (HCC): ICD-10-CM

## 2025-05-21 PROCEDURE — 99214 OFFICE O/P EST MOD 30 MIN: CPT | Performed by: STUDENT IN AN ORGANIZED HEALTH CARE EDUCATION/TRAINING PROGRAM

## 2025-05-21 RX ORDER — ISOSORBIDE MONONITRATE 30 MG/1
30 TABLET, EXTENDED RELEASE ORAL DAILY
Qty: 30 TABLET | Refills: 17 | Status: SHIPPED | OUTPATIENT
Start: 2025-05-21 | End: 2026-11-12

## 2025-05-21 NOTE — PROGRESS NOTES
"Advanced Heart Failure/Pulmonary Hypertension Outpatient Note - Vesna Langston 65 y.o. female MRN: [id number]    @ Encounter: [id number]    Assessment:  65 y.o. female PMH and acute problems listed later in this note (a partial list may also be included within 'assessment' section) p/w HF fu.  I first met Vesna Langston on 5/6/24.    Primarily ICM, HFimpEF, LVEF 30-35%>PCI+gdmt>40-45% 10/2024 TTE, nondilated LV  LHC 03/22/2024: s/p PCI to 100% stenosis of ostial/proximal LAD. No other residual dz elsewhere.  cMRI 03/26/2024: LVEF 20%. LVIDd 4.5 cm. \"Transmural scarring of the mid anterior, anteroseptal and inferoseptal walls, the apical anterior, septal and inferior walls and the apex.\"   Coronary artery disease  S/p MI in 03/2024; received PCI to 100% stenosis of ostial/proximal LAD. No residual dz elsewhere.  History of monomorphic ventricular tachycardia              Per EP notes, felt to be scar mediated.               S/p secondary prevention ICD.  Atrial flutter, paroxysmal               Anticoagulation on Eliquis.   was on amiodarone per EP.  transitioned to dofetilide 125mcg twice daily 11/2024 during elective tikosyn admit.  PE 5/2024. CTA: Single subsegmental right lower lobe pulmonary embolus as shown on abdominal CT.   Hyperlipidemia  Diabetes mellitus, type II  Chronic back pain, multiple past surgeries, follows surgical team in [facility] [place]. Dr. Pena  History of tobacco abuse  4/7/24 CT: IMPRESSION:  Suspected small hemorrhage from the anterior wall of the distal gastric body with focal wall thickening. Bleeding due to underlying lesion or PUD is suspected. Recommend endoscopic correlation.  Past heavy NSAID use, 2024 stopped  dieting in 8617-0793 - was having only 1000 calories a day she says, increased somewhat in 2025 to 1500 rachael/day, have discussed nutrition cx with her in past.  Lung and spleen granulomas.  5/1/25 back surgery found Pseudoarthrosis with loose L3-S1 " "screws:  Procedure(s):  Exploration of fusion mass, L3-S1: 56742  Removal of hardware, L3-S1: 05227  Arthrodesis, L5-S1 TLIF: 81883  Laminotomy/facetectomy, L5-S1: 83755   Arthrodesis, L4-5 PL: 57191   Arthrodesis, L3-4 PL: 51740   Posterior segmental instrumentation/pedicle screw fixation,L3-S1: 75381  Works in [employer] in past  Prior smoker, quit 5689-1440      I have reviewed all pertinent patient data including but not limited to:        Lab Units 05/12/25  0457 05/08/25  0609 05/07/25  0602   CREATININE mg/dL 0.77 0.78 0.81             Lab Results   Component Value Date    K 4.0 05/12/2025     Lab Results   Component Value Date    HGBA1C 5.9 (H) 03/28/2025     Lab Results   Component Value Date    PPT3KYJVFJKD 3.749 04/10/2025     Lab Results   Component Value Date    LDLCALC 68 03/28/2025     Lab Results   Component Value Date    BNP 78 11/30/2024      No results found for: \"NTBNP\"       Home scale dry weight 220lbs  When decompensated she has been 227lbs    TODAY'S PLAN:     05/21/25  Warm, euvolemic  No new cardiac complaints, feels generally well  Sp spine surgery 5/1/25, tolerated well  Back on all prior home cardiac rx now  BP acceptable    Reduce imdur 30 bid > qd  Cw prn nitro SL, no recent use    Gdmt below  Limited by hypotension in past, her regimen has been interrupted intermittently due to hypotension in 2024  No changes today  No MR    On AC+plavix  On high intens statin    On tikosyn per EP    Advised healthy diet, exercise/rehab as able    Follow up:  With me in 3 mo, or sooner if symptoms evolve  In addition to follow up with their other medical providers    Key info from my prior notes:    We have discussed some of her risks with major back surgery. She understands there is some level of risk including recurrent MI and even death that we cannot entirely mitigate.    Follows with sleep medicine, planned on Tx for POP, has not started yet per prior notes - advised begin planned Tx as soon as " able on multiple occasions     She usually takes lasix 40 bid with self titration for weight gain and SOB. She is completing 4 day burst of lasix 80 bid for 7 lbs weight gain over 5 days (no diet indiscretion). Then she will return to 40 bid.  She requests to reattempt jardiance now, which will also have some diuretic effect. She felt well on this drug in past.  Extensive discussions about risks vs benefits as previously documented. She has had UTI in past causing sglt2i cessation, she recognizes risks and wishes to reattempt 1/2025.    Strict RTC precautions given  Fu in 2 weeks  She will call in 4-5 days with symptom check in  Low threshold repeat echo  Doubt new PE but monitor closely    Home weight up 217 > 220lbs today, feels mild leg swelling, bloating; has been much more SOB x 3 days which began suddenly - previously felt very well for first few days after recent discharge (had elective tiksoyn initiation admit). Has ongoing unremitting LUE numbness from inner biceps area to hand (not more proximally) - ongoing x days after moved boxes with grand"[employer], [employer] [employer]"ds in attic during weekend, up to 40lbs.  No new chest pain  No syncope  Worsening hypotension  Minimal salt intake, drinks about 50-55 oz water daily she says  She was treated for yeast infx x 3days per PCP, she says her symptoms entirely resolved, no new pain, no fevers.   She does note she is recovering from sinusitis.    Messaged EP team today regarding QTc trend  I cannot see their ECG from today in muse or epic media tab, priors borderline  Fu EP for tikosyn plan    Will need to back off gdmt at least for now 2/2 hypotension and her Sx, reductions as below    Now seeing nutritionist, no longer doing extreme caloric restriction to 800 cals per day, as she was in recent past  Hydrating better    She has been consuming only 800 calories a day for 1 month or longer in an extreme attempt to lose weight > advised against this. Nutrition referral given  today.    Trial imdur for any component of angina  Then 1 week later up entresto and fu BMP afterwards; this will also help with volume management  Cw lasix 20 qd standing for now, which is recent increase from prior    Warm, euvolemic  Has increased lasix 20 prn to qdaily standing for past 1 week for increased weight, mild sob, mild edema of feet - no major improvement.  Today is last minute urgent visit for ED visit 8/25 for CP in setting of home  transiently, ACS ruled out. Then 8/26 yesterday had episode of vomiting, faintness, no LOC, no chest pain, has felt better since then. No ICD shocks, no palpitations. Did not improve with meclizine. Normal-higher blood sugars at home recently/during events.  Very rare opioid use she says for her back pain issues, none since 1-2 weeks ago    Follows GI    Discussed therapeutic lifestyle changes, low-moderate intensity exercise, maintain acceptable hydration 64 oz water daily, salt restrict, Mediterranean vs DASH diet, weight loss  Avoid nsaids    Further review of return to work next week  Safest plan would be no driving x 3 mo - resume 6/2024 if remains stable    Pharmacotherapies / Neurohormonal Blockade:  --Beta Blocker: metoprolol succinate 50 qd  --ARNi / ACEi / ARB: entresto 49/51 bid > pause 12/2/24 for hypotension (near term resumption planned)>resume entresto 49/51 bid 12/13/24 (low threshold reduce dose)  --Aldosterone Antagonist: aldactone 25 qd > paused 12/2/24 for hypotension, consider resumption future  --SGLT2 Inhibitor: jardiance 25 qd for HF and DM > subsequently Sglt2i stopped in early 2024 2/2 yeast infx>9/10/24 Re-attempt lower sglt2i dose now, jardiance 10 qd, long discussion risks vs benefits and this is pt wish> DC 11/2024 2/2 recurrent yeast infx (may consider repeat re-attempt future, pt indicates desire for this 12/2/24) > plan above, pt requests reattempt 1/3/25 and we resumed 25 qd    --Diuretic: lasix 40 bid, potassium 20 qd > she has  moved to as needed for both as of 2025  Long discussion about evidence of worsening HF, when to self uptitrate home diuretic and call cardiology office     Sudden Cardiac Death Risk Reduction:  --Medtronic dual chamber ICD in situ since 03/2024 (secondary prevention).   5/2025  MDT DUAL ICD (MVP ON) / ACTIVE SYSTEM IS MRI CONDITIONAL   CARELINK TRANSMISSION: BATTERY VOLTAGE ADEQUATE (11.7 YRS). AP-16%, <0.1%. ALL AVAILABLE LEAD PARAMETERS WITHIN NORMAL LIMITS. NO SIGNIFICANT HIGH RATE EPISODES. OPTI-VOL WITHIN NORMAL LIMITS. NORMAL DEVICE FUNCTION.   Electrical Resynchronization:  --Candidacy for BiV device: narrow QRS.   Advanced Therapies: Will continue to monitor.    Studies:  I have reviewed all pertinent patient data/labs/imaging where available, including but not limited to the below studies. Selected results may be displayed here but comprehensive listing is omitted for note clarity and can be found in the epic chart.    ECG.    Echo.    Stress.    Cath.    HPI:   64 y.o. female PMH and acute problems listed later in this note (a partial list may also be included within 'assessment' section) p/w HF fu.  I first met Vesna Langston on 5/6/24.  No new CP/SOB/dizziness/palpitations/syncope.  No new fatigue.  No new unintentional weight changes.  No new leg swelling, PND, pillow orthopnea.  No new fevers, chills, cough, nausea, vomiting, diarrhea, dysuria.      Interval History:  As noted in 'plan' section above and prior epic chart notes.    No new dizziness/palpitations/syncope.  No new fatigue.  No new PND, pillow orthopnea.  No new fevers, chills, cough, nausea, vomiting, diarrhea, dysuria.    Past Medical History:   Diagnosis Date    Acute respiratory insufficiency 03/22/2024    Allergic     Anxiety 4/24    Arrhythmia 3/22/24    Atrial fibrillation (HCC) 3/22/24    Back pain 2022    Bradycardia 3/22/24    Breast cyst 2000    Chronic HFrEF (heart failure with reduced ejection fraction) ([facility])     Clotting  disorder ([facility]) 4/1/24    Coronary artery disease     COVID-19 virus infection 11/28/2022    Depression 4/24    Diabetes mellitus ([facility])     Disease of thyroid gland 4/09/2024    Environmental and seasonal allergies 1/1/1960    GERD (gastroesophageal reflux disease) 10/09    GI (gastrointestinal bleed) 4/1/24    Heart disease 3/24    History of placement of internal cardiac defibrillator 3/27/24    Hyperlipidemia     Hypoglycemia     ICD (implantable cardioverter-defibrillator) in place     Ischemic cardiomyopathy     Lactic acidosis 03/22/2024    Migraine 1980    Morning headache 1980    Myocardial infarction ([facility]) 3/22/2024    Nausea & vomiting 04/03/2025    Otitis media 1966    Pacemaker 3/27/24    Peptic ulceration 4/2024    Seasonal allergies     Sinus problem 1970    ST elevation myocardial infarction involving left anterior descending (LAD) coronary artery ([facility]) 03/22/2024    Tobacco abuse     Visual impairment 1967     Patient Active Problem List    Diagnosis Date Noted    Vitamin D deficiency 05/09/2025    Acute pain 05/06/2025    GERD (gastroesophageal reflux disease) 05/06/2025    Hypothyroid 05/06/2025    Anemia 05/02/2025    Status post lumbar spinal fusion 05/01/2025    Other spondylosis with radiculopathy, lumbar region 03/10/2025    Stage 2 chronic kidney disease 01/03/2025    Other pulmonary embolism without acute cor pulmonale, unspecified chronicity ([facility]) 01/03/2025    Acute on chronic systolic (congestive) heart failure ([facility]) 01/03/2025    Obesity, morbid ([facility]) 01/03/2025    T2DM (type 2 diabetes mellitus) ([facility]) 01/03/2025    Numbness and tingling in left arm 11/30/2024    Primary osteoarthritis of right knee 10/08/2024    History of torn meniscus of right knee 10/08/2024    Chronic pain of right knee 10/08/2024    Localized edema 10/02/2024    POP (obstructive sleep apnea) 09/23/2024    Class 1 obesity due to excess calories with serious comorbidity and body mass index (BMI) of 34.0 to 34.9 in  adult 08/27/2024    Ganglion cyst of finger of left hand 08/15/2024    History of pulmonary embolism 05/07/2024    History of gastric ulcer 05/07/2024    Acquired hypothyroidism 04/19/2024    Constipation 04/10/2024    Type 2 diabetes mellitus, without long-term current use of insulin ([facility]) 04/07/2024    S/P ICD (internal cardiac defibrillator) procedure 03/27/2024    Chronic low back pain 03/25/2024    HFrEF (heart failure with reduced ejection fraction) ([facility]) 03/25/2024    Ischemic cardiomyopathy 03/24/2024    CAD (coronary artery disease) 03/23/2024    S/P coronary artery stent placement 03/23/2024    Atrial flutter, paroxysmal ([facility]) 03/22/2024    Mixed hyperlipidemia 03/22/2024    History of tobacco abuse 03/22/2024    History of sustained ventricular tachycardia 03/22/2024    Back pain 07/31/2022    Sciatica of left side 07/31/2022       ROS:  10 point ROS negative except as specified in HPI/interval history    Allergies   Allergen Reactions    Fish-Derived Products - Food Allergy Anaphylaxis     Cannot have all seafood products    Shellfish-Derived Products - Food Allergy Anaphylaxis    Azithromycin Hives and Rash     Current Outpatient Medications   Medication Instructions    acetaminophen (TYLENOL) 650 mg, Oral, Every 6 hours PRN    albuterol (ProAir HFA) 90 mcg/act inhaler 2 puffs, Inhalation, Every 6 hours PRN    apixaban (ELIQUIS) 5 mg, Oral, 2 times daily    atorvastatin (LIPITOR) 80 mg, Oral, Every evening    clopidogrel (PLAVIX) 75 mg, Oral, Daily    diphenhydrAMINE (BENADRYL) 25 mg, Every 6 hours PRN    dofetilide (TIKOSYN) 125 mcg, Oral, Every 12 hours scheduled    Empagliflozin (JARDIANCE) 25 mg, Oral, Every morning    Entresto 49-51 MG TABS     ergocalciferol (VITAMIN D2) 50,000 Units, Oral, Weekly    ferrous gluconate (FERGON) 324 mg, Oral, Every other day    fluticasone (FLONASE) 50 mcg/act nasal spray 1 spray, Nasal, Daily    furosemide (LASIX) 40 mg, Oral, As needed    HYDROcodone-acetaminophen  (NORCO) 5-325 mg per tablet 1 tablet, Oral, Every 8 hours PRN, May cause drowsiness and dizziness. This medication does contain tylenol/acetaminophen, so will need to limit any supplemental tylenol intake. Do not exceed 3000 mg (3g) of tylenol/acetaminophen every 24 hours.    isosorbide mononitrate (IMDUR) 30 mg, Oral, Daily, 30 mg in the am and 30 mg in the pm    levothyroxine (SYNTHROID) 75 mcg, Oral, Daily    meclizine (ANTIVERT) 25 mg, Oral, Every 8 hours PRN    metoprolol succinate (TOPROL-XL) 50 mg, Oral, Daily at bedtime    Multiple Vitamin (multivitamin) tablet 1 tablet, Daily    pantoprazole (PROTONIX) 40 mg, Oral, 2 times daily before meals    potassium chloride (Klor-Con M20) 20 mEq tablet 20 mEq, Oral, As needed    semaglutide (1 mg/dose) (OZEMPIC) 1 mg, Subcutaneous, Weekly      Social History     Socioeconomic History    Marital status:      Spouse name: Not on file    Number of children: 3    Years of education: Not on file    Highest education level: Not on file   Occupational History    Not on file   Tobacco Use    Smoking status: Former     Current packs/day: 0.00     Average packs/day: 1 pack/day for 24.2 years (24.2 ttl pk-yrs)     Types: Cigarettes     Start date: 2000     Quit date: 3/22/2024     Years since quittin.1     Passive exposure: Never    Smokeless tobacco: Never    Tobacco comments:     Smoked 0.5-1 ppd; quit in 2024.    Vaping Use    Vaping status: Never Used   Substance and Sexual Activity    Alcohol use: Not Currently     Alcohol/week: 1.0 standard drink of alcohol     Types: 1 Shots of liquor per week     Comment: Every 2or 3months    Drug use: Never    Sexual activity: Not Currently     Partners: Male     Birth control/protection: Post-menopausal   Other Topics Concern    Not on file   Social History Narrative    Not on file     Social Drivers of Health     Financial Resource Strain: Low Risk  (2023)    Received from [facility]    Overall  Financial Resource Strain (CARDIA)     Difficulty of Paying Living Expenses: Not hard at all   Food Insecurity: No Food Insecurity (2025)    Nursing - Inadequate Food Risk Classification     Worried About Running Out of Food in the Last Year: Never true     Ran Out of Food in the Last Year: Never true     Ran Out of Food in the Last Year: Never true   Transportation Needs: No Transportation Needs (2025)    Nursing - Transportation Risk Classification     Lack of Transportation: Not on file     Lack of Transportation: No   Physical Activity: Not on file   Stress: No Stress Concern Present (2023)    Received from [facility] - Occupational Stress Questionnaire     Feeling of Stress : Not at all   Social Connections: Unknown (2024)    Received from [facility]    Social Soneter     How often do you feel lonely or isolated from those around you? (Adult - for ages 18 years and over): Not on file   Intimate Partner Violence: Unknown (2025)    Nursing IPS     Feels Physically and Emotionally Safe: Not on file     Physically Hurt by Someone: Not on file     Humiliated or Emotionally Abused by Someone: Not on file     Physically Hurt by Someone: No     Hurt or Threatened by Someone: No   Housing Stability: Unknown (2025)    Nursing: Inadequate Housing Risk Classification     Has Housing: Not on file     Worried About Losing Housing: Not on file     Unable to Get Utilities: Not on file     Unable to Pay for Housing in the Last Year: No     Has Housin     Family History   Adopted: Yes   Problem Relation Age of Onset    Depression Mother     Sleep apnea Mother     Heart disease Father     Snoring Father     Restless legs syndrome Father     OCD Daughter      Physical Exam:  Vitals:    25 1326   BP: 108/60   BP Location: Left arm   Patient Position: Sitting   Cuff Size: Standard   Pulse: 80   SpO2: 97%   Weight: 96.6 kg (213  "lb)   Height: 5' 8\" (1.727 m)           Constitutional: NAD, non toxic  Ears/nose/mouth/throat: atraumatic  CV: RRR, nl S1S2, no murmurs/rubs/gallups, no JVD, no HJR  Resp: CTABL  GI: Soft, NTND  MSK: no swollen joints in exposed areas  Extr: No edema, warm LE  Pysche: Normal affect  Neuro: appropriate in conversation  Skin: dry and intact in exposed areas    Labs & Results:  Lab Results   Component Value Date    SODIUM 140 05/12/2025    K 4.0 05/12/2025     05/12/2025    CO2 25 05/12/2025    BUN 9 05/12/2025    CREATININE 0.77 05/12/2025    GLUC 106 05/12/2025    CALCIUM 9.0 05/12/2025     Lab Results   Component Value Date    WBC 9.13 05/12/2025    HGB 10.1 (L) 05/12/2025    HCT 33.3 (L) 05/12/2025    MCV 86 05/12/2025     05/12/2025       Counseling / Coordination of Care  Greater than 50% of total time was spent with the patient and / or family counseling and / or coordination of care.  We discussed diagnoses, most recent studies, tests and any changes in treatment plan.    Thank you for the opportunity to participate in the care of this patient.    Vitor Bell MD  Attending Physician  Advanced Heart Failure and Transplant Cardiology  [facility]  "

## 2025-05-23 ENCOUNTER — OFFICE VISIT (OUTPATIENT)
Dept: FAMILY MEDICINE CLINIC | Facility: CLINIC | Age: 65
End: 2025-05-23

## 2025-05-23 VITALS
DIASTOLIC BLOOD PRESSURE: 56 MMHG | WEIGHT: 216.71 LBS | OXYGEN SATURATION: 98 % | HEIGHT: 68 IN | SYSTOLIC BLOOD PRESSURE: 111 MMHG | BODY MASS INDEX: 32.84 KG/M2 | HEART RATE: 87 BPM

## 2025-05-23 DIAGNOSIS — Z98.1 STATUS POST LUMBAR SPINAL FUSION: Primary | ICD-10-CM

## 2025-05-23 DIAGNOSIS — R52 ACUTE PAIN: ICD-10-CM

## 2025-05-23 DIAGNOSIS — J01.90 ACUTE BACTERIAL SINUSITIS: ICD-10-CM

## 2025-05-23 DIAGNOSIS — E11.9 TYPE 2 DIABETES MELLITUS WITHOUT COMPLICATION, WITHOUT LONG-TERM CURRENT USE OF INSULIN (HCC): ICD-10-CM

## 2025-05-23 DIAGNOSIS — K59.03 DRUG-INDUCED CONSTIPATION: ICD-10-CM

## 2025-05-23 DIAGNOSIS — I25.10 CORONARY ARTERY DISEASE INVOLVING NATIVE CORONARY ARTERY OF NATIVE HEART WITHOUT ANGINA PECTORIS: Chronic | ICD-10-CM

## 2025-05-23 DIAGNOSIS — E78.2 MIXED HYPERLIPIDEMIA: ICD-10-CM

## 2025-05-23 DIAGNOSIS — B96.89 ACUTE BACTERIAL SINUSITIS: ICD-10-CM

## 2025-05-23 PROBLEM — R20.0 NUMBNESS AND TINGLING IN LEFT ARM: Status: RESOLVED | Noted: 2024-11-30 | Resolved: 2025-05-23

## 2025-05-23 PROBLEM — M67.442 GANGLION CYST OF FINGER OF LEFT HAND: Status: RESOLVED | Noted: 2024-08-15 | Resolved: 2025-05-23

## 2025-05-23 PROBLEM — R20.2 NUMBNESS AND TINGLING IN LEFT ARM: Status: RESOLVED | Noted: 2024-11-30 | Resolved: 2025-05-23

## 2025-05-23 RX ORDER — PREGABALIN 75 MG/1
75 CAPSULE ORAL 3 TIMES DAILY
COMMUNITY

## 2025-05-23 RX ORDER — AMOXICILLIN 875 MG/1
875 TABLET, COATED ORAL 2 TIMES DAILY
Qty: 14 TABLET | Refills: 0 | Status: SHIPPED | OUTPATIENT
Start: 2025-05-23 | End: 2025-05-30

## 2025-05-23 RX ORDER — OXYCODONE HYDROCHLORIDE 5 MG/1
5 TABLET ORAL EVERY 6 HOURS PRN
COMMUNITY

## 2025-05-23 NOTE — ASSESSMENT & PLAN NOTE
S/p B/L navigated revision L3-S1 laminectomy/decompression, revision L3-S1 posterior instrumented spinal fusion with TLIF on 05/1/25  WBAT LLE  LSO brace OOB  F/u Dr Mcrae 2 weeks, scheduled for 5/19 outpatient - patient saw him and continue with no bending, twisting, lifting and will see him again 6/16 - continue walking as much as she can   Continue outpatient PT 3x weekly

## 2025-05-23 NOTE — ASSESSMENT & PLAN NOTE
Orders:    semaglutide, 0.25 or 0.5 mg/dose, (Ozempic, 0.25 or 0.5 MG/DOSE,) 2 mg/3 mL injection pen; Inject 0.75 mL (0.5 mg total) under the skin every 7 days 0.25 mg under the skin every 7 days for 4 doses (28 days), THEN 0.5 mg under the skin every 7 days

## 2025-05-23 NOTE — PROGRESS NOTES
Transition of Care Visit:  Name: Vesna Langston      : 1960      MRN: [id number]  Encounter Provider: Melissa Montoya DO  Encounter Date: 2025   Encounter department: [facility] PRIMARY CARE    Assessment & Plan  Status post lumbar spinal fusion  S/p B/L navigated revision L3-S1 laminectomy/decompression, revision L3-S1 posterior instrumented spinal fusion with TLIF on 25  WBAT LLE  LSO brace OOB  F/u Dr Mcrae 2 weeks, scheduled for  outpatient - patient saw him and continue with no bending, twisting, lifting and will see him again  - continue walking as much as she can   Continue outpatient PT 3x weekly          Acute pain  Tylenol 650 Q6h PRN  Oxycodone 5 mg q6h PRN for 3 days  Pregabalin          Drug-induced constipation  Scheduled bowel regimen while on opioids  Resolved at this time per patient          Acute bacterial sinusitis    Orders:    amoxicillin (AMOXIL) 875 mg tablet; Take 1 tablet (875 mg total) by mouth 2 (two) times a day for 7 days    Mixed hyperlipidemia    Orders:    semaglutide, 0.25 or 0.5 mg/dose, (Ozempic, 0.25 or 0.5 MG/DOSE,) 2 mg/3 mL injection pen; Inject 0.75 mL (0.5 mg total) under the skin every 7 days 0.25 mg under the skin every 7 days for 4 doses (28 days), THEN 0.5 mg under the skin every 7 days    Type 2 diabetes mellitus without complication, without long-term current use of insulin ([facility])    Lab Results   Component Value Date    HGBA1C 5.9 (H) 2025       Orders:    semaglutide, 0.25 or 0.5 mg/dose, (Ozempic, 0.25 or 0.5 MG/DOSE,) 2 mg/3 mL injection pen; Inject 0.75 mL (0.5 mg total) under the skin every 7 days 0.25 mg under the skin every 7 days for 4 doses (28 days), THEN 0.5 mg under the skin every 7 days    Coronary artery disease involving native coronary artery of native heart without angina pectoris    Orders:    semaglutide, 0.25 or 0.5 mg/dose, (Ozempic, 0.25 or 0.5 MG/DOSE,) 2 mg/3 mL injection pen; Inject 0.75  "mL (0.5 mg total) under the skin every 7 days 0.25 mg under the skin every 7 days for 4 doses (28 days), THEN 0.5 mg under the skin every 7 days         Return for Next scheduled follow up.      History of Present Illness     Transitional Care Management Review:   Vesna Langston is a 65 y.o. female here for TCM follow up.     During the TCM phone call patient stated:  TCM Call (since 5/9/2025)       Date and time call was made  5/15/2025 10:12 AM    Hospital care reviewed  Records reviewed    Patient was hospitialized at  [facility]    Date of Admission  05/06/25    Date of discharge  05/14/25    Diagnosis  Status post lumbar spinal fusion    Disposition  Home          TCM Call (since 5/9/2025)       I have advised the patient to call PCP with any new or worsening symptoms  Penelopejose Cope CMA          \"presented to the [facility] on 5/1 for elective orthopedic surgery due to lumbar radiculopathy. Pain begain 2 years ago after an injury at work. S/p exploration of fusion mass L3-S1, removal of hardware L3-S1, TLIF TLIF, laminotomy/facetectomy, L5-S1; Arthrodesis, T4-5; Arthrodesis, T3-4; Posterior segmental instrumentation/pedicle screw fixation,L3-S1. Admitted to [facility] 5/6\"  \"Patient participated in a comprehensive interdisciplinary inpatient rehabilitation program which included involvment of MD, therapies (PT, OT, and/or SLP), RN, CM, SW, dietary, and psychology services. She was able to be advanced to a modified independent level of assist and considered safe for discharge home\"    Patient is doing well. Only concern today is sinusitis. Symptoms started last week and are not respond to OTC treatment.     She would like to restart on ozempic now but at the lower dose.     HPI  Review of Systems   HENT:  Positive for congestion, sinus pressure and sinus pain.    Respiratory:  Positive for cough.      Objective   /56 (BP Location: Right arm, Patient Position: Sitting, Cuff Size: " "Standard)   Pulse 87   Ht 5' 8\" (1.727 m)   Wt 98.3 kg (216 lb 11.4 oz)   SpO2 98%   BMI 32.95 kg/m²     Physical Exam  HENT:      Head: Normocephalic and atraumatic.      Right Ear: External ear normal.      Left Ear: External ear normal.      Nose: Congestion present.      Right Sinus: Maxillary sinus tenderness and frontal sinus tenderness present.      Left Sinus: Maxillary sinus tenderness and frontal sinus tenderness present.      Mouth/Throat:      Mouth: Mucous membranes are moist.      Pharynx: Oropharynx is clear. No oropharyngeal exudate or posterior oropharyngeal erythema.     Eyes:      Extraocular Movements: Extraocular movements intact.      Conjunctiva/sclera: Conjunctivae normal.       Musculoskeletal:         General: No tenderness.      Right lower leg: No edema.      Left lower leg: No edema.      Comments: Surgical incision, staples out, clean dry and in tact   No sign of infection        Medications have been reviewed by provider in current encounter      "

## 2025-05-23 NOTE — ASSESSMENT & PLAN NOTE
Lab Results   Component Value Date    HGBA1C 5.9 (H) 03/28/2025       Orders:    semaglutide, 0.25 or 0.5 mg/dose, (Ozempic, 0.25 or 0.5 MG/DOSE,) 2 mg/3 mL injection pen; Inject 0.75 mL (0.5 mg total) under the skin every 7 days 0.25 mg under the skin every 7 days for 4 doses (28 days), THEN 0.5 mg under the skin every 7 days

## 2025-05-23 NOTE — PHYSICAL THERAPY NOTE
[facility] PT DISCHARGE SUMMARY  Pt admitted to [facility] with dx of elective lumbar spinal fusion L3-S1 laminectomy/decompression, revision L3-S1 posterior instrumented spinal fusion with TLIF on 5/1/25 pt with +spinal precautions and orders for LSO. During pt's rehab stay she has made excellent progress towards LTGs. At time of d/c pt is currently [place] for transfers, ambulation community distances with use of RW, independent for stair navigation with R HR and SPC, and independent for object retrieval with use of reacher. Pt has RW and SPC at home, no DME needed. No FT warranted as pt [place] at time of d/c. No f/u services warranted and pt to d/c to daughters home at time of d/c.

## 2025-05-30 ENCOUNTER — HOSPITAL ENCOUNTER (OUTPATIENT)
Dept: RADIOLOGY | Facility: CLINIC | Age: 65
Discharge: HOME | End: 2025-05-30
Payer: COMMERCIAL

## 2025-05-30 VITALS — HEIGHT: 68 IN | BODY MASS INDEX: 31.52 KG/M2 | WEIGHT: 208 LBS

## 2025-05-30 DIAGNOSIS — Z12.31 ENCOUNTER FOR SCREENING MAMMOGRAM FOR BREAST CANCER: ICD-10-CM

## 2025-05-30 PROCEDURE — 77067 SCR MAMMO BI INCL CAD: CPT

## 2025-05-30 PROCEDURE — 77063 BREAST TOMOSYNTHESIS BI: CPT

## 2025-06-02 ENCOUNTER — OFFICE VISIT (OUTPATIENT)
Dept: FAMILY MEDICINE CLINIC | Facility: CLINIC | Age: 65
End: 2025-06-02
Payer: COMMERCIAL

## 2025-06-02 ENCOUNTER — TELEPHONE (OUTPATIENT)
Age: 65
End: 2025-06-02

## 2025-06-02 VITALS
DIASTOLIC BLOOD PRESSURE: 62 MMHG | OXYGEN SATURATION: 100 % | HEART RATE: 82 BPM | SYSTOLIC BLOOD PRESSURE: 115 MMHG | TEMPERATURE: 98 F | BODY MASS INDEX: 31.91 KG/M2 | WEIGHT: 209.88 LBS

## 2025-06-02 DIAGNOSIS — R19.7 DIARRHEA, UNSPECIFIED TYPE: ICD-10-CM

## 2025-06-02 DIAGNOSIS — R09.81 NASAL CONGESTION: ICD-10-CM

## 2025-06-02 DIAGNOSIS — F41.9 ANXIETY AND DEPRESSION: Primary | ICD-10-CM

## 2025-06-02 DIAGNOSIS — J06.9 URI WITH COUGH AND CONGESTION: Primary | ICD-10-CM

## 2025-06-02 DIAGNOSIS — F32.A ANXIETY AND DEPRESSION: Primary | ICD-10-CM

## 2025-06-02 PROCEDURE — 99213 OFFICE O/P EST LOW 20 MIN: CPT | Performed by: PHYSICIAN ASSISTANT

## 2025-06-02 NOTE — PROGRESS NOTES
Name: Vesna Langstno      : 1960      MRN: [id number]  Encounter Provider: Kim Castle PA-C  Encounter Date: 2025   Encounter department: [facility] PRIMARY CARE  :  Assessment & Plan  URI with cough and congestion  Patient was seen 1 week ago in the office for follow-up of her lumbar surgery and for acute sinusitis.  She was placed on amoxicillin and has been taking this as prescribed.  She is coughing and producing yellow and green sputum.  No fever or chills.  Coughing worsens when she lays down.  She is taking Coricidin HBP over-the-counter.  Because of her cardiac history, she is reluctant to take any other medication.  She has been continuing to walk as part of her cardiac rehab and back surgery rehab.  She is not having excessive symptoms when walking.       Nasal congestion  Patient is having issues with nasal congestion.  This is interfered with her using her CPAP at night.  She is using Flonase and is going to be adjusting how she gives herself the Flonase to see if this can help with her symptoms.       Diarrhea, unspecified type  Since starting the amoxicillin, patient has been having some diarrhea.  She is using Activia yogurt also.  We talked about the change in james that can occur with the use of antibiotics and she will continue to use the yogurt to help to replenish the bowel james.              History of Present Illness   HPI: 65-year-old female sees Dr. Montoya for her primary care services.  She had recent surgery on the lumbar spine the beginning of last month.  She saw Dr. Montoya 1 week ago and also had complaints of sinusitis.  She has completed her course of amoxicillin.  Her URI symptoms are as above.    She was concerned that this could be due to her heart.  I told her that any problems with not getting blood out of her heart would produce clear or frothy pink sputum but not yellow and green sputum.  She is going to try sleeping in a recliner  while still using her CPAP for the next couple days.  She is also going to start eating chicken soup which is a natural mucolytic agent.    Oral intake is sufficient.  No problems passing her water.  Review of Systems: Recovering from acute sinusitis and upper respiratory infection along with some element of seasonal allergies.  She does use an air purifier at home.    Objective   /62 (BP Location: Left arm, Patient Position: Sitting, Cuff Size: Standard)   Pulse 82   Temp 98 °F (36.7 °C) (Tympanic)   Wt 95.2 kg (209 lb 14.1 oz)   SpO2 100%   BMI 31.91 kg/m²      Physical Exam: Reviewed vital signs.  She is normotensive and afebrile.    Patient has a regular rhythm to her heart although tones are somewhat distant.  Lung sounds show that she is moving air quite well.  Aerating bases without difficulty.  Not using accessory muscles of respiration.  No egophony changes.  No signs of any consolidation.  Administrative Statements   I have spent a total time of 20 minutes in caring for this patient on the day of the visit/encounter including Diagnostic results, Prognosis, Risks and benefits of tx options, Instructions for management, Patient and family education, Impressions, Counseling / Coordination of care, Documenting in the medical record, Reviewing/placing orders in the medical record (including tests, medications, and/or procedures), and Obtaining or reviewing history      Patient will see Dr. Montoya on July 9 for follow-up with Ozempic therapy.  She has lost 10 pounds since starting Ozempic and a total of 36 pounds since her heart attack 1 year ago.  The patient will contact us if her condition worsens instead of improves before she sees Dr. Montoya next month..

## 2025-06-04 ENCOUNTER — RESULTS FOLLOW-UP (OUTPATIENT)
Dept: FAMILY MEDICINE CLINIC | Facility: CLINIC | Age: 65
End: 2025-06-04

## 2025-06-04 ENCOUNTER — APPOINTMENT (EMERGENCY)
Dept: CT IMAGING | Facility: HOSPITAL | Age: 65
End: 2025-06-04
Payer: COMMERCIAL

## 2025-06-04 ENCOUNTER — NURSE TRIAGE (OUTPATIENT)
Dept: OTHER | Facility: OTHER | Age: 65
End: 2025-06-04

## 2025-06-04 ENCOUNTER — HOSPITAL ENCOUNTER (EMERGENCY)
Facility: HOSPITAL | Age: 65
Discharge: HOME | End: 2025-06-04
Attending: EMERGENCY MEDICINE
Payer: COMMERCIAL

## 2025-06-04 VITALS
WEIGHT: 188.49 LBS | SYSTOLIC BLOOD PRESSURE: 129 MMHG | HEIGHT: 68 IN | BODY MASS INDEX: 28.57 KG/M2 | TEMPERATURE: 97.9 F | OXYGEN SATURATION: 98 % | DIASTOLIC BLOOD PRESSURE: 59 MMHG | HEART RATE: 79 BPM | RESPIRATION RATE: 13 BRPM

## 2025-06-04 DIAGNOSIS — R07.9 CHEST PAIN, UNSPECIFIED TYPE: Primary | ICD-10-CM

## 2025-06-04 LAB
2HR DELTA HS TROPONIN: 0 NG/L
ALBUMIN SERPL BCG-MCNC: 4.4 G/DL (ref 3.5–5)
ALP SERPL-CCNC: 61 U/L (ref 34–104)
ALT SERPL W P-5'-P-CCNC: 14 U/L (ref 7–52)
ANION GAP SERPL CALCULATED.3IONS-SCNC: 10 MMOL/L (ref 4–13)
AST SERPL W P-5'-P-CCNC: 16 U/L (ref 13–39)
BASOPHILS # BLD AUTO: 0.07 THOUSANDS/ÂΜL (ref 0–0.1)
BASOPHILS NFR BLD AUTO: 1 % (ref 0–1)
BILIRUB SERPL-MCNC: 0.58 MG/DL (ref 0.2–1)
BUN SERPL-MCNC: 13 MG/DL (ref 5–25)
CALCIUM SERPL-MCNC: 9.7 MG/DL (ref 8.4–10.2)
CARDIAC TROPONIN I PNL SERPL HS: 5 NG/L (ref ?–50)
CARDIAC TROPONIN I PNL SERPL HS: 5 NG/L (ref ?–50)
CHLORIDE SERPL-SCNC: 105 MMOL/L (ref 96–108)
CO2 SERPL-SCNC: 22 MMOL/L (ref 21–32)
CREAT SERPL-MCNC: 0.84 MG/DL (ref 0.6–1.3)
EOSINOPHIL # BLD AUTO: 0.26 THOUSAND/ÂΜL (ref 0–0.61)
EOSINOPHIL NFR BLD AUTO: 3 % (ref 0–6)
ERYTHROCYTE [DISTWIDTH] IN BLOOD BY AUTOMATED COUNT: 15.9 % (ref 11.6–15.1)
FLUAV AG UPPER RESP QL IA.RAPID: NEGATIVE
FLUBV AG UPPER RESP QL IA.RAPID: NEGATIVE
GFR SERPL CREATININE-BSD FRML MDRD: 72 ML/MIN/1.73SQ M
GLUCOSE SERPL-MCNC: 140 MG/DL (ref 65–140)
HCT VFR BLD AUTO: 36.4 % (ref 34.8–46.1)
HGB BLD-MCNC: 10.8 G/DL (ref 11.5–15.4)
IMM GRANULOCYTES # BLD AUTO: 0.04 THOUSAND/UL (ref 0–0.2)
IMM GRANULOCYTES NFR BLD AUTO: 0 % (ref 0–2)
LIPASE SERPL-CCNC: 15 U/L (ref 11–82)
LYMPHOCYTES # BLD AUTO: 2.34 THOUSANDS/ÂΜL (ref 0.6–4.47)
LYMPHOCYTES NFR BLD AUTO: 24 % (ref 14–44)
MCH RBC QN AUTO: 24.5 PG (ref 26.8–34.3)
MCHC RBC AUTO-ENTMCNC: 29.7 G/DL (ref 31.4–37.4)
MCV RBC AUTO: 83 FL (ref 82–98)
MONOCYTES # BLD AUTO: 0.79 THOUSAND/ÂΜL (ref 0.17–1.22)
MONOCYTES NFR BLD AUTO: 8 % (ref 4–12)
NEUTROPHILS # BLD AUTO: 6.23 THOUSANDS/ÂΜL (ref 1.85–7.62)
NEUTS SEG NFR BLD AUTO: 64 % (ref 43–75)
NRBC BLD AUTO-RTO: 0 /100 WBCS
PLATELET # BLD AUTO: 380 THOUSANDS/UL (ref 149–390)
PMV BLD AUTO: 9.7 FL (ref 8.9–12.7)
POTASSIUM SERPL-SCNC: 3.8 MMOL/L (ref 3.5–5.3)
PROT SERPL-MCNC: 7.4 G/DL (ref 6.4–8.4)
RBC # BLD AUTO: 4.4 MILLION/UL (ref 3.81–5.12)
SARS-COV+SARS-COV-2 AG RESP QL IA.RAPID: NEGATIVE
SODIUM SERPL-SCNC: 137 MMOL/L (ref 135–147)
WBC # BLD AUTO: 9.73 THOUSAND/UL (ref 4.31–10.16)

## 2025-06-04 PROCEDURE — 87811 SARS-COV-2 COVID19 W/OPTIC: CPT | Performed by: EMERGENCY MEDICINE

## 2025-06-04 PROCEDURE — 83690 ASSAY OF LIPASE: CPT | Performed by: EMERGENCY MEDICINE

## 2025-06-04 PROCEDURE — 99285 EMERGENCY DEPT VISIT HI MDM: CPT | Performed by: EMERGENCY MEDICINE

## 2025-06-04 PROCEDURE — 87804 INFLUENZA ASSAY W/OPTIC: CPT | Performed by: EMERGENCY MEDICINE

## 2025-06-04 PROCEDURE — 85025 COMPLETE CBC W/AUTO DIFF WBC: CPT | Performed by: EMERGENCY MEDICINE

## 2025-06-04 PROCEDURE — 84484 ASSAY OF TROPONIN QUANT: CPT | Performed by: EMERGENCY MEDICINE

## 2025-06-04 PROCEDURE — 71275 CT ANGIOGRAPHY CHEST: CPT

## 2025-06-04 PROCEDURE — 80053 COMPREHEN METABOLIC PANEL: CPT | Performed by: EMERGENCY MEDICINE

## 2025-06-04 PROCEDURE — 93005 ELECTROCARDIOGRAM TRACING: CPT

## 2025-06-04 PROCEDURE — 99285 EMERGENCY DEPT VISIT HI MDM: CPT

## 2025-06-04 PROCEDURE — 36415 COLL VENOUS BLD VENIPUNCTURE: CPT | Performed by: EMERGENCY MEDICINE

## 2025-06-04 RX ORDER — DIPHENOXYLATE HYDROCHLORIDE AND ATROPINE SULFATE 2.5; .025 MG/1; MG/1
1 TABLET ORAL ONCE
Status: COMPLETED | OUTPATIENT
Start: 2025-06-04 | End: 2025-06-04

## 2025-06-04 RX ADMIN — DIPHENOXYLATE HYDROCHLORIDE AND ATROPINE SULFATE 1 TABLET: 2.5; .025 TABLET ORAL at 21:12

## 2025-06-04 RX ADMIN — IOHEXOL 85 ML: 350 INJECTION, SOLUTION INTRAVENOUS at 19:19

## 2025-06-04 NOTE — TELEPHONE ENCOUNTER
"REASON FOR CONVERSATION: Chest Pain    SYMPTOMS: chest pain refractory to nitro x2, somewhat improves after nitro but returns. Also +SOB and nausea.    OTHER HEALTH INFORMATION: hx ICM, HF with improved EF after GDMT and PCI to LAD 3/2024     PROTOCOL DISPOSITION: Call  Now On call Dr Reyes notified and acknowledged    CARE ADVICE PROVIDED: advised 911, rationale provided (treat in route, dispo to most appropriate facility if EKG concerning). Pt declined. Will likely have her neighbor drive her.    PRACTICE FOLLOW-UP: pending ED eval          Reason for Disposition   [1] Chest pain lasts > 5 minutes AND [2] history of heart disease (i.e., angina, heart attack, heart failure, bypass surgery, takes nitroglycerin)    Answer Assessment - Initial Assessment Questions  1. LOCATION: \"Where does it hurt?\"        Central    2. RADIATION: \"Does the pain go anywhere else?\" (e.g., into neck, jaw, arms, back)      Also has nausea    3. ONSET: \"When did the chest pain begin?\" (Minutes, hours or days)       Hour or so    4. PATTERN: \"Does the pain come and go, or has it been constant since it started?\"  \"Does it get worse with exertion?\"       Continuous, improved slightly with nitro x2 but returned    5. DURATION: \"How long does it last\" (e.g., seconds, minutes, hours)      >20 minutes    6. SEVERITY: \"How bad is the pain?\"  (e.g., Scale 1-10; mild, moderate, or severe)      Now is moderate    7. CARDIAC RISK FACTORS: \"Do you have any history of heart problems or risk factors for heart disease?\" (e.g., angina, prior heart attack; diabetes, high blood pressure, high cholesterol, smoker, or strong family history of heart disease)       ICMP, HFimpEF, s/p PCI to LAD 3/2024    8. PULMONARY RISK FACTORS: \"Do you have any history of lung disease?\"  (e.g., blood clots in lung, asthma, emphysema, birth control pills)      Hx PE    9. CAUSE: \"What do you think is causing the chest pain?\"      Wonders if she is dehydrated from " "diarrheal illness this week    10. OTHER SYMPTOMS: \"Do you have any other symptoms?\" (e.g., dizziness, nausea, vomiting, sweating, fever, difficulty breathing, cough)        Nausea        SOB    Protocols used: Chest Pain-Adult-AH    "

## 2025-06-04 NOTE — ED PROVIDER NOTES
Time reflects when diagnosis was documented in both MDM as applicable and the Disposition within this note       Time User Action Codes Description Comment    6/4/2025  8:59 PM Dangelo Campos Add [R07.9] Chest pain, unspecified type           ED Disposition       ED Disposition   Discharge    Condition   Stable    Date/Time   Wed Jun 4, 2025  8:59 PM    Comment   Vesna Kian discharge to home/self care.                   Assessment & Plan       Medical Decision Making  1821: Patient appears well, vital signs reviewed.  Placed on the monitor.  EKG nonischemic.  Plan to complete basic labs including cardiac enzymes, send COVID/flu PCR.  Plan to complete CT chest PE protocol.    1930: CT and labs reviewed.  Plan to complete T-2 and make disposition.  Patient has remained stable throughout ED course.  Chest pain-free.    2105: T-2 reviewed.  The patient has remained stable throughout ED course.  Patient is still chest pain-free.  Stable for discharge.  Close follow-up with cardiology.    Amount and/or Complexity of Data Reviewed  External Data Reviewed: labs, radiology and notes.     Details: CTA chest PE protocol 5/7/2024--Single subsegmental right lower lobe pulmonary embolus as shown on abdominal CT.     RV/LV diameter ratio less than 1 with nothing to indicate right heart strain.     Question mild interstitial edema.    Labs: ordered.  Radiology: ordered.     Details: CTA chest PE protocol--no acute pathology  ECG/medicine tests: ordered and independent interpretation performed.     Details: Normal sinus rhythm 85 bpm, no acute ischemia.    Risk  Prescription drug management.             Medications   diphenoxylate-atropine (LOMOTIL) 2.5-0.025 mg per tablet 1 tablet (has no administration in time range)   iohexol (OMNIPAQUE) 350 MG/ML injection (MULTI-DOSE) 85 mL (85 mL Intravenous Given 6/4/25 1919)       ED Risk Strat Scores   HEART Risk Score      Flowsheet Row Most Recent Value   Heart Score Risk Calculator     History 0 Filed at: 06/04/2025 2059   ECG 0 Filed at: 06/04/2025 2059   Age 2 Filed at: 06/04/2025 2059   Risk Factors 1 Filed at: 06/04/2025 2059   Troponin 0 Filed at: 06/04/2025 2059   HEART Score 3 Filed at: 06/04/2025 2059          HEART Risk Score      Flowsheet Row Most Recent Value   Heart Score Risk Calculator    History 0 Filed at: 06/04/2025 2059   ECG 0 Filed at: 06/04/2025 2059   Age 2 Filed at: 06/04/2025 2059   Risk Factors 1 Filed at: 06/04/2025 2059   Troponin 0 Filed at: 06/04/2025 2059   HEART Score 3 Filed at: 06/04/2025 2059                      No data recorded        SBIRT 22yo+      Flowsheet Row Most Recent Value   Initial Alcohol Screen: US AUDIT-C     1. How often do you have a drink containing alcohol? 0 Filed at: 06/04/2025 1901   2. How many drinks containing alcohol do you have on a typical day you are drinking?  0 Filed at: 06/04/2025 1901   3b. FEMALE Any Age, or MALE 65+: How often do you have 4 or more drinks on one occassion? 0 Filed at: 06/04/2025 1901   Audit-C Score 0 Filed at: 06/04/2025 1901   CHRIS: How many times in the past year have you...    Used an illegal drug or used a prescription medication for non-medical reasons? Never Filed at: 06/04/2025 1901                            History of Present Illness       Chief Complaint   Patient presents with    Chest Pain     Patient reports chest pain that started at 1700. Patient took 2 nitro. +sob. Patient called cardiologist and they sent her here. PMH of MI March 22, 2024.        Past Medical History[1]   Past Surgical History[2]   Family History[3]   Social History[4]   E-Cigarette/Vaping    E-Cigarette Use Never User       E-Cigarette/Vaping Substances    Nicotine No     THC No     CBD No     Flavoring No     Other No     Unknown No       I have reviewed and agree with the history as documented.       History provided by:  Medical records and patient  Chest Pain  Pain location:  Substernal area and L chest  Pain quality:  aching and dull    Pain radiates to:  Does not radiate  Pain radiates to the back: no    Pain severity:  Mild  Onset quality:  Gradual  Duration:  90 minutes  Timing:  Constant  Progression:  Improving  Chronicity:  Recurrent  Context comment:  History of ischemic cardiomyopathy, PE on Eliquis and Plavix, states she was sitting down when she developed substernal chest discomfort rating to the left chest.  Patient has been dealing with cough for the past 3 days, productive of green sputum,diarrhea  Relieved by:  Nitroglycerin (Patient took 2 nitroglycerin at shortly after 5 PM with good relief of discomfort)  Worsened by:  Nothing tried  Ineffective treatments: imodium.  Associated symptoms: abdominal pain, cough and shortness of breath    Associated symptoms: no altered mental status, no anorexia, no anxiety, no back pain, no claudication, no diaphoresis, no dizziness, no dysphagia, no fatigue, no fever, no headache, no heartburn, no lower extremity edema, no nausea, no near-syncope, no numbness, no orthopnea, no palpitations, no PND, no syncope, not vomiting and no weakness        Review of Systems   Constitutional:  Negative for chills, diaphoresis, fatigue and fever.   HENT:  Negative for ear discharge, ear pain, rhinorrhea, sore throat and trouble swallowing.    Eyes:  Negative for pain and visual disturbance.   Respiratory:  Positive for cough and shortness of breath.    Cardiovascular:  Positive for chest pain. Negative for palpitations, orthopnea, claudication, syncope, PND and near-syncope.   Gastrointestinal:  Positive for abdominal pain and diarrhea. Negative for abdominal distention, anal bleeding, anorexia, blood in stool, constipation, heartburn, nausea, rectal pain and vomiting.   Endocrine: Negative for polydipsia, polyphagia and polyuria.   Genitourinary:  Negative for difficulty urinating, dysuria, flank pain and hematuria.   Musculoskeletal:  Negative for arthralgias and back pain.   Skin:   Negative for color change and rash.   Allergic/Immunologic: Negative for immunocompromised state.   Neurological:  Negative for dizziness, seizures, syncope, weakness, numbness and headaches.   Psychiatric/Behavioral:  Negative for confusion and self-injury. The patient is not nervous/anxious.    All other systems reviewed and are negative.          Objective       ED Triage Vitals [06/04/25 1807]   Temperature Pulse Blood Pressure Respirations SpO2 Patient Position - Orthostatic VS   97.9 °F (36.6 °C) 61 135/82 18 99 % Lying      Temp Source Heart Rate Source BP Location FiO2 (%) Pain Score    Temporal Monitor Left arm -- --      Vitals      Date and Time Temp Pulse SpO2 Resp BP Pain Score FACES Pain Rating User   06/04/25 2030 -- 79 98 % 13 129/59 -- -- AR   06/04/25 2015 -- 69 99 % 19 109/68 -- -- AR   06/04/25 1900 -- 79 96 % 12 102/56 -- -- AR   06/04/25 1807 97.9 °F (36.6 °C) 61 99 % 18 135/82 -- -- AD            Physical Exam  Vitals and nursing note reviewed.   Constitutional:       General: She is not in acute distress.     Appearance: Normal appearance. She is not ill-appearing, toxic-appearing or diaphoretic.   HENT:      Head: Normocephalic and atraumatic.      Nose: Nose normal. No congestion or rhinorrhea.      Mouth/Throat:      Mouth: Mucous membranes are moist.      Pharynx: Oropharynx is clear. No oropharyngeal exudate or posterior oropharyngeal erythema.     Eyes:      General:         Right eye: No discharge.         Left eye: No discharge.       Cardiovascular:      Rate and Rhythm: Normal rate and regular rhythm.      Pulses: Normal pulses.      Heart sounds: Normal heart sounds. No murmur heard.     No gallop.   Pulmonary:      Effort: Pulmonary effort is normal. No respiratory distress.      Breath sounds: Normal breath sounds. No stridor. No wheezing, rhonchi or rales.   Chest:      Chest wall: No tenderness.   Abdominal:      General: Bowel sounds are normal. There is no distension.       Palpations: Abdomen is soft. There is no mass.      Tenderness: There is no abdominal tenderness. There is no right CVA tenderness, left CVA tenderness, guarding or rebound.      Hernia: No hernia is present.     Musculoskeletal:         General: Normal range of motion.      Cervical back: Normal range of motion and neck supple.      Right lower leg: No edema.      Left lower leg: No edema.     Skin:     General: Skin is warm and dry.      Capillary Refill: Capillary refill takes less than 2 seconds.     Neurological:      General: No focal deficit present.      Mental Status: She is alert and oriented to person, place, and time.      Cranial Nerves: No cranial nerve deficit.      Sensory: No sensory deficit.      Motor: No weakness.      Coordination: Coordination normal.      Gait: Gait normal.      Deep Tendon Reflexes: Reflexes normal.     Psychiatric:         Mood and Affect: Mood normal.         Behavior: Behavior normal.         Thought Content: Thought content normal.         Judgment: Judgment normal.         Results Reviewed       Procedure Component Value Units Date/Time    HS Troponin I 2hr [[id number]]  (Normal) Collected: 06/04/25 2015    Lab Status: Final result Specimen: Blood from Arm, Left Updated: 06/04/25 2046     hs TnI 2hr 5 ng/L      Delta 2hr hsTnI 0 ng/L     HS Troponin 0hr (reflex protocol) [[id number]]  (Normal) Collected: 06/04/25 1829    Lab Status: Final result Specimen: Blood from Arm, Left Updated: 06/04/25 1859     hs TnI 0hr 5 ng/L     FLU/COVID Rapid Antigen (30 min. TAT) - Preferred screening test in ED [[id number]]  (Normal) Collected: 06/04/25 1829    Lab Status: Final result Specimen: Nares from Nose Updated: 06/04/25 1851     SARS COV Rapid Antigen Negative     Influenza A Rapid Antigen Negative     Influenza B Rapid Antigen Negative    Narrative:      This test has been performed using the [employer] Maida 2 FLU+SARS Antigen test under the Emergency Use Authorization (EUA). This  test has been validated by the  and verified by the performing laboratory. The Maida uses lateral flow immunofluorescent sandwich assay to detect SARS-COV, Influenza A and Influenza B Antigen.     The [employer] Maida 2 SARS Antigen test does not differentiate between SARS-CoV and SARS-CoV-2.     Negative results are presumptive and may be confirmed with a molecular assay, if necessary, for patient management. Negative results do not rule out SARS-CoV-2 or influenza infection and should not be used as the sole basis for treatment or patient management decisions. A negative test result may occur if the level of antigen in a sample is below the limit of detection of this test.     Positive results are indicative of the presence of viral antigens, but do not rule out bacterial infection or co-infection with other viruses.     All test results should be used as an adjunct to clinical observations and other information available to the provider.    FOR PEDIATRIC PATIENTS - copy/paste COVID Guidelines URL to browser: [email or web address]    Comprehensive metabolic panel [[id number]] Collected: 06/04/25 1829    Lab Status: Final result Specimen: Blood from Arm, Left Updated: 06/04/25 1851     Sodium 137 mmol/L      Potassium 3.8 mmol/L      Chloride 105 mmol/L      CO2 22 mmol/L      ANION GAP 10 mmol/L      BUN 13 mg/dL      Creatinine 0.84 mg/dL      Glucose 140 mg/dL      Calcium 9.7 mg/dL      AST 16 U/L      ALT 14 U/L      Alkaline Phosphatase 61 U/L      Total Protein 7.4 g/dL      Albumin 4.4 g/dL      Total Bilirubin 0.58 mg/dL      eGFR 72 ml/min/1.73sq m     Narrative:      National Kidney Disease Foundation guidelines for Chronic Kidney Disease (CKD):     Stage 1 with normal or high GFR (GFR > 90 mL/min/1.73 square meters)    Stage 2 Mild CKD (GFR = 60-89 mL/min/1.73 square meters)    Stage 3A Moderate CKD (GFR = 45-59 mL/min/1.73 square meters)     Stage 3B Moderate CKD (GFR = 30-44 mL/min/1.73 square meters)    Stage 4 Severe CKD (GFR = 15-29 mL/min/1.73 square meters)    Stage 5 End Stage CKD (GFR <15 mL/min/1.73 square meters)  Note: GFR calculation is accurate only with a steady state creatinine    Lipase [[id number]]  (Normal) Collected: 06/04/25 1829    Lab Status: Final result Specimen: Blood from Arm, Left Updated: 06/04/25 1851     Lipase 15 u/L     CBC and differential [[id number]]  (Abnormal) Collected: 06/04/25 1829    Lab Status: Final result Specimen: Blood from Arm, Left Updated: 06/04/25 1835     WBC 9.73 Thousand/uL      RBC 4.40 Million/uL      Hemoglobin 10.8 g/dL      Hematocrit 36.4 %      MCV 83 fL      MCH 24.5 pg      MCHC 29.7 g/dL      RDW 15.9 %      MPV 9.7 fL      Platelets 380 Thousands/uL      nRBC 0 /100 WBCs      Segmented % 64 %      Immature Grans % 0 %      Lymphocytes % 24 %      Monocytes % 8 %      Eosinophils Relative 3 %      Basophils Relative 1 %      Absolute Neutrophils 6.23 Thousands/µL      Absolute Immature Grans 0.04 Thousand/uL      Absolute Lymphocytes 2.34 Thousands/µL      Absolute Monocytes 0.79 Thousand/µL      Eosinophils Absolute 0.26 Thousand/µL      Basophils Absolute 0.07 Thousands/µL             CTA chest pe study   Final Interpretation by Kiko Figueredo MD (06/04 1952)      No pulmonary embolism. No pneumonia or pulmonary edema.                  Computerized Assisted Algorithm (CAA) may have aided analysis of applicable images.      Workstation performed: [id number]             Procedures    ED Medication and Procedure Management   Prior to Admission Medications   Prescriptions Last Dose Informant Patient Reported? Taking?   Empagliflozin (Jardiance) 25 MG TABS  Self No No   Sig: Take 1 tablet (25 mg total) by mouth every morning   Entresto 49-51 MG TABS  Self Yes No   HYDROcodone-acetaminophen (NORCO) 5-325 mg per tablet  Self No No   Sig: Take 1 tablet by mouth every 8 (eight) hours as needed  for pain May cause drowsiness and dizziness. This medication does contain tylenol/acetaminophen, so will need to limit any supplemental tylenol intake. Do not exceed 3000 mg (3g) of tylenol/acetaminophen every 24 hours. Max Daily Amount: 3 tablets   Multiple Vitamin (multivitamin) tablet  Self Yes No   Sig: Take 1 tablet by mouth in the morning.   acetaminophen (TYLENOL) 325 mg tablet  Self No No   Sig: Take 2 tablets (650 mg total) by mouth every 6 (six) hours as needed for mild pain   albuterol (ProAir HFA) 90 mcg/act inhaler  Self No No   Sig: Inhale 2 puffs every 6 (six) hours as needed for wheezing   apixaban (ELIQUIS) 5 mg  Self No No   Sig: Take 1 tablet (5 mg total) by mouth 2 (two) times a day   atorvastatin (LIPITOR) 80 mg tablet  Self No No   Sig: TAKE 1 TABLET BY MOUTH EVERY DAY IN THE EVENING   clopidogrel (PLAVIX) 75 mg tablet  Self No No   Sig: Take 1 tablet (75 mg total) by mouth daily   diphenhydrAMINE (BENADRYL) 25 mg capsule  Self Yes No   Sig: Take 25 mg by mouth every 6 (six) hours as needed for itching   dofetilide (TIKOSYN) 125 mcg capsule  Self No No   Sig: Take 1 capsule (125 mcg total) by mouth every 12 (twelve) hours   ergocalciferol (VITAMIN D2) 50,000 units  Self No No   Sig: Take 1 capsule (50,000 Units total) by mouth once a week   ferrous gluconate (FERGON) 324 mg tablet  Self No No   Sig: Take 1 tablet (324 mg total) by mouth every other day   fluticasone (FLONASE) 50 mcg/act nasal spray  Self No No   Si spray into each nostril daily   furosemide (LASIX) 40 mg tablet  Self No No   Sig: Take 1 tablet (40 mg total) by mouth if needed (if worse shortness of breath or weight up 3lbs)   isosorbide mononitrate (IMDUR) 30 mg 24 hr tablet   No No   Sig: Take 1 tablet (30 mg total) by mouth daily 30 mg in the am and 30 mg in the pm   levothyroxine (Synthroid) 75 mcg tablet  Self No No   Sig: Take 1 tablet (75 mcg total) by mouth daily   meclizine (ANTIVERT) 25 mg tablet  Self No No   Sig:  Take 1 tablet (25 mg total) by mouth every 8 (eight) hours as needed for dizziness   metoprolol succinate (TOPROL-XL) 25 mg 24 hr tablet  Self No No   Sig: Take 2 tablets (50 mg total) by mouth daily at bedtime   oxyCODONE (ROXICODONE) 5 immediate release tablet   Yes No   Sig: Take 5 mg by mouth every 6 (six) hours as needed for moderate pain Patient is taking as needed   pantoprazole (PROTONIX) 40 mg tablet  Self No No   Sig: TAKE 1 TABLET BY MOUTH 2 TIMES A DAY BEFORE MEALS.   potassium chloride (Klor-Con M20) 20 mEq tablet  Self No No   Sig: Take 1 tablet (20 mEq total) by mouth if needed (on lasix days)   pregabalin (LYRICA) 75 mg capsule   Yes No   Sig: Take 75 mg by mouth 3 (three) times a day   semaglutide, 0.25 or 0.5 mg/dose, (Ozempic, 0.25 or 0.5 MG/DOSE,) 2 mg/3 mL injection pen   No No   Sig: Inject 0.75 mL (0.5 mg total) under the skin every 7 days 0.25 mg under the skin every 7 days for 4 doses (28 days), THEN 0.5 mg under the skin every 7 days      Facility-Administered Medications: None     Patient's Medications   Discharge Prescriptions    No medications on file     No discharge procedures on file.  ED SEPSIS DOCUMENTATION   Time reflects when diagnosis was documented in both MDM as applicable and the Disposition within this note       Time User Action Codes Description Comment    6/4/2025  8:59 PM Dangelo Campos Add [R07.9] Chest pain, unspecified type                      [1]   Past Medical History:  Diagnosis Date    Acute respiratory insufficiency 03/22/2024    Allergic     Anxiety 4/24    Arrhythmia 3/22/24    Atrial fibrillation (HCC) 3/22/24    Back pain 2022    Bradycardia 3/22/24    BRCA1 negative     BRCA2 negative     Breast cyst 2000    Chronic HFrEF (heart failure with reduced ejection fraction) (HCC)     Clotting disorder (HCC) 4/1/24    Coronary artery disease     COVID-19 virus infection 11/28/2022    Depression 4/24    Diabetes mellitus (HCC)     Disease of thyroid gland 4/09/2024     Environmental and seasonal allergies 1960    GERD (gastroesophageal reflux disease) 10/09    GI (gastrointestinal bleed) 24    Heart disease 3/24    History of placement of internal cardiac defibrillator 3/27/24    Hyperlipidemia     Hypoglycemia     ICD (implantable cardioverter-defibrillator) in place     Ischemic cardiomyopathy     Lactic acidosis 2024    Migraine 1980    Morning headache 1980    Myocardial infarction (HCC) 3/22/2024    Nausea & vomiting 2025    Otitis media 1966    Pacemaker 3/27/24    Peptic ulceration 2024    Seasonal allergies     Sinus problem 1970    ST elevation myocardial infarction involving left anterior descending (LAD) coronary artery ([facility]) 2024    Tobacco abuse     Visual impairment    [2]   Past Surgical History:  Procedure Laterality Date    ADENOIDECTOMY      APPENDECTOMY      APPENDECTOMY LAPAROSCOPIC N/A 2024    Procedure: APPENDECTOMY LAPAROSCOPIC;  Surgeon: Lauryn Ullrich, DO;  Location: [facility];  Service: General    BACK SURGERY      BREAST EXCISIONAL BIOPSY Right 2000    cyst removed- benign    CARDIAC CATHETERIZATION N/A 2024    Procedure: Cardiac pci;  Surgeon: Gabriel Myers MD;  [facility];  Service: Cardiology    CARDIAC CATHETERIZATION N/A 2024    Procedure: Cardiac Coronary Angiogram;  Surgeon: Gabriel Myers MD;  [facility];  Service: Cardiology    CARDIAC CATHETERIZATION N/A 2024    Procedure: Cardiac PCI AMI;  Surgeon: Gabriel Myers MD;  [facility];  Service: Cardiology    CARDIAC CATHETERIZATION N/A 2024    Procedure: Cardiac IVUS;  Surgeon: Gabriel Myers MD;  [facility];  Service: Cardiology    CARDIAC DEFIBRILLATOR PLACEMENT      CARDIAC ELECTROPHYSIOLOGY PROCEDURE N/A 2024    Procedure: Cardiac icd implant;  Surgeon: Jeffy Lama MD;  [facility];  Service: Cardiology     SECTION       COLONOSCOPY      ECTOPIC PREGNANCY SURGERY      INSERT / REPLACE / REMOVE PACEMAKER      LUMBAR FUSION Bilateral 2025    Procedure: Navigated revision L3-S1 laminectomy/decompression, revision L3-S1 posterior instrumented spinal fusion with TLIF;  Surgeon: Charles Mcrae MD;  Location: [facility];  Service: Orthopedics    MASS EXCISION Left 10/18/2024    Procedure: EXCISION BIOPSY TISSUE LESION/MASS UPPER EXTREMITY - Left index finger;  Surgeon: Leandro Campos MD;  Location: [facility];  Service: Orthopedics    SEPTOPLASTY      SPINE SURGERY  23    TONSILLECTOMY      TONSILLECTOMY AND ADENOIDECTOMY  1964    TRIGGER POINT INJECTION      UPPER GASTROINTESTINAL ENDOSCOPY  24   [3]   Family History  Adopted: Yes   Problem Relation Name Age of Onset    Depression Mother Unknown     Sleep apnea Mother Unknown     Heart disease Father Unknown     Snoring Father Unknown     Restless legs syndrome Father Unknown     OCD Daughter     [4]   Social History  Tobacco Use    Smoking status: Former     Current packs/day: 0.00     Average packs/day: 1 pack/day for 24.2 years (24.2 ttl pk-yrs)     Types: Cigarettes     Start date: 2000     Quit date: 3/22/2024     Years since quittin.2     Passive exposure: Never    Smokeless tobacco: Never    Tobacco comments:     Smoked 0.5-1 ppd; quit in 2024.    Vaping Use    Vaping status: Never Used   Substance Use Topics    Alcohol use: Not Currently     Alcohol/week: 1.0 standard drink of alcohol     Types: 1 Shots of liquor per week     Comment: Every 2or 3months    Drug use: Never        Dangelo Campos MD  [phone]

## 2025-06-05 ENCOUNTER — HOSPITAL ENCOUNTER (OUTPATIENT)
Dept: ULTRASOUND IMAGING | Facility: HOSPITAL | Age: 65
Discharge: HOME | End: 2025-06-05
Attending: FAMILY MEDICINE
Payer: COMMERCIAL

## 2025-06-05 ENCOUNTER — HOSPITAL ENCOUNTER (OUTPATIENT)
Dept: MAMMOGRAPHY | Facility: HOSPITAL | Age: 65
Discharge: HOME | End: 2025-06-05
Attending: FAMILY MEDICINE
Payer: COMMERCIAL

## 2025-06-05 VITALS — WEIGHT: 188 LBS | HEIGHT: 68 IN | BODY MASS INDEX: 28.49 KG/M2

## 2025-06-05 DIAGNOSIS — R92.8 ABNORMAL SCREENING MAMMOGRAM: ICD-10-CM

## 2025-06-05 LAB
ATRIAL RATE: 85 BPM
P AXIS: 61 DEGREES
PR INTERVAL: 150 MS
QRS AXIS: 51 DEGREES
QRSD INTERVAL: 88 MS
QT INTERVAL: 394 MS
QTC INTERVAL: 468 MS
T WAVE AXIS: 68 DEGREES
VENTRICULAR RATE: 85 BPM

## 2025-06-05 PROCEDURE — 76642 ULTRASOUND BREAST LIMITED: CPT

## 2025-06-05 PROCEDURE — 93010 ELECTROCARDIOGRAM REPORT: CPT | Performed by: INTERNAL MEDICINE

## 2025-06-05 PROCEDURE — G0279 TOMOSYNTHESIS, MAMMO: HCPCS

## 2025-06-05 PROCEDURE — 77065 DX MAMMO INCL CAD UNI: CPT

## 2025-06-06 ENCOUNTER — RESULTS FOLLOW-UP (OUTPATIENT)
Dept: FAMILY MEDICINE CLINIC | Facility: CLINIC | Age: 65
End: 2025-06-06

## 2025-06-06 DIAGNOSIS — Z98.1 S/P LUMBAR FUSION: ICD-10-CM

## 2025-06-06 NOTE — TELEPHONE ENCOUNTER
Refill must be reviewed and completed by the office or provider. The refill is unable to be approved or denied by the medication management team.    Cannot be delegated                      1 [id number] ** 05/19/2025 05/19/2025 HYDROcodone BITARTRATE 5 MG ORAL TABLET/ACETAMINOPHEN 325 MG (Tablet) 30.0 5 325 MG-5 MG 30.0 BIMAL DOWNING [facility] Workers Compensation 0 / 0 [place]    1 [id number] 05/14/2025 05/14/2025 05/14/2025 oxyCODONE HCL (Tablet) 12.0 3 5 MG 30.0 ERICA LOVE [employer] Commercial Insurance 0 / 0 [place]    1 [id number] ** 04/10/2025 04/10/2025 Pregabalin (Capsule) 90.0 30 75 MG NA [facility] Workers Compensation 0 / 2 [place]    1 [id number] ** 03/24/2025 03/24/2025 Pregabalin (Capsule) 90.0 30 100 MG NA [facility] Workers Compensation 0 / 2 [place]    1 [id number] ** 03/10/2025 03/10/2025 Pregabalin (Capsule) 90.0 30 75 MG NA [facility] Commercial Insurance 0 / 2 [place]    1 [id number] ** 02/18/2025 12/10/2024 HYDROcodone BITARTRATE 5 MG ORAL TABLET/ACETAMINOPHEN 325 MG (Tablet) 30.0 30 325 MG-5 MG 5.0 MATEO BLEDSOE [facility] Commercial Insurance 0 / 0 [place]    1 [id number] ** 02/07/2025 02/07/2025 Pregabalin (Capsule) 90.0 30 50 MG NA [facility] Workers Compensation 0 / 2 [place]    1 [id number] ** 01/30/2025 01/30/2025 traMADol HCL (Tablet) 20.0 5 50 MG 40.0 PAULA WEBSTER [facility] Workers Compensation 0 / 0 [place]    1 [id number] ** 01/20/2025 12/10/2024 HYDROcodone BITARTRATE 5 MG ORAL TABLET/ACETAMINOPHEN 325 MG (Tablet) 30.0 30 325 MG-5 MG 5.0 MATEO BLEDSOE [facility] Commercial Insurance 0 / 0 [place]    1 [id number] ** 12/20/2024 12/10/2024 HYDROcodone BITARTRATE 5 MG ORAL TABLET/ACETAMINOPHEN 325 MG (Tablet) 30.0 30 325 MG-5 MG

## 2025-06-10 ENCOUNTER — OFFICE VISIT (OUTPATIENT)
Dept: CARDIOLOGY CLINIC | Facility: CLINIC | Age: 65
End: 2025-06-10
Payer: COMMERCIAL

## 2025-06-10 VITALS
HEART RATE: 79 BPM | DIASTOLIC BLOOD PRESSURE: 62 MMHG | SYSTOLIC BLOOD PRESSURE: 110 MMHG | OXYGEN SATURATION: 98 % | HEIGHT: 68 IN | BODY MASS INDEX: 32.3 KG/M2 | WEIGHT: 213.1 LBS

## 2025-06-10 DIAGNOSIS — R55 SYNCOPE AND COLLAPSE: ICD-10-CM

## 2025-06-10 DIAGNOSIS — I25.5 ISCHEMIC CARDIOMYOPATHY: Primary | ICD-10-CM

## 2025-06-10 DIAGNOSIS — I25.10 CORONARY ARTERY DISEASE INVOLVING NATIVE CORONARY ARTERY OF NATIVE HEART, UNSPECIFIED WHETHER ANGINA PRESENT: ICD-10-CM

## 2025-06-10 DIAGNOSIS — I25.118 CORONARY ARTERY DISEASE OF NATIVE HEART WITH STABLE ANGINA PECTORIS, UNSPECIFIED VESSEL OR LESION TYPE (HCC): ICD-10-CM

## 2025-06-10 DIAGNOSIS — I47.20 VENTRICULAR TACHYCARDIA (HCC): ICD-10-CM

## 2025-06-10 PROCEDURE — 99214 OFFICE O/P EST MOD 30 MIN: CPT | Performed by: STUDENT IN AN ORGANIZED HEALTH CARE EDUCATION/TRAINING PROGRAM

## 2025-06-10 RX ORDER — HYDROCODONE BITARTRATE AND ACETAMINOPHEN 5; 325 MG/1; MG/1
1 TABLET ORAL EVERY 8 HOURS PRN
Qty: 30 TABLET | Refills: 0 | Status: ON HOLD | OUTPATIENT
Start: 2025-06-10

## 2025-06-10 RX ORDER — SACUBITRIL AND VALSARTAN 49; 51 MG/1; MG/1
1 TABLET, FILM COATED ORAL 2 TIMES DAILY
Qty: 180 TABLET | Refills: 5 | Status: ON HOLD | OUTPATIENT
Start: 2025-06-10 | End: 2026-12-02

## 2025-06-10 RX ORDER — METOPROLOL SUCCINATE 25 MG/1
50 TABLET, EXTENDED RELEASE ORAL
Qty: 180 TABLET | Refills: 5 | Status: ON HOLD | OUTPATIENT
Start: 2025-06-10 | End: 2026-12-02

## 2025-06-10 RX ORDER — ISOSORBIDE MONONITRATE 30 MG/1
90 TABLET, EXTENDED RELEASE ORAL DAILY
Qty: 90 TABLET | Refills: 17 | Status: SHIPPED | OUTPATIENT
Start: 2025-06-10 | End: 2025-06-11 | Stop reason: ALTCHOICE

## 2025-06-10 NOTE — PROGRESS NOTES
"Advanced Heart Failure/Pulmonary Hypertension Outpatient Note - Vesna Langston 65 y.o. female MRN: [id number]    @ Encounter: [id number]    Assessment:  65 y.o. female PMH and acute problems listed later in this note (a partial list may also be included within 'assessment' section) p/w HF fu.  I first met Vesna Langston on 5/6/24.    Primarily ICM, HFimpEF, LVEF 30-35%>PCI+gdmt>40-45% 10/2024 TTE, nondilated LV  LHC 03/22/2024: s/p PCI to 100% stenosis of ostial/proximal LAD. No other residual dz elsewhere.  cMRI 03/26/2024: LVEF 20%. LVIDd 4.5 cm. \"Transmural scarring of the mid anterior, anteroseptal and inferoseptal walls, the apical anterior, septal and inferior walls and the apex.\"   Coronary artery disease  S/p MI in 03/2024; received PCI to 100% stenosis of ostial/proximal LAD. No residual dz elsewhere.  History of monomorphic ventricular tachycardia              Per EP notes, felt to be scar mediated.               S/p secondary prevention ICD.  Atrial flutter, paroxysmal               Anticoagulation on Eliquis.   was on amiodarone per EP.  transitioned to dofetilide 125mcg twice daily 11/2024 during elective tikosyn admit.  PE 5/2024. CTA: Single subsegmental right lower lobe pulmonary embolus as shown on abdominal CT.   Hyperlipidemia  Diabetes mellitus, type II  Chronic back pain, multiple past surgeries, follows surgical team in [facility] [place]. Dr. Pena  History of tobacco abuse  4/7/24 CT: IMPRESSION:  Suspected small hemorrhage from the anterior wall of the distal gastric body with focal wall thickening. Bleeding due to underlying lesion or PUD is suspected. Recommend endoscopic correlation.  Past heavy NSAID use, 2024 stopped  dieting in 4742-8860 - was having only 1000 calories a day she says, increased somewhat in 2025 to 1500 rachael/day, have discussed nutrition cx with her in past.  Lung and spleen granulomas.  5/1/25 back surgery found Pseudoarthrosis with loose L3-S1 " "screws:  Procedure(s):  Exploration of fusion mass, L3-S1: 70462  Removal of hardware, L3-S1: 79017  Arthrodesis, L5-S1 TLIF: 58462  Laminotomy/facetectomy, L5-S1: 46443   Arthrodesis, L4-5 PL: 33692   Arthrodesis, L3-4 PL: 53033   Posterior segmental instrumentation/pedicle screw fixation,L3-S1: 52524  Works in [employer] in past  Prior smoker, quit 8123-2068      I have reviewed all pertinent patient data including but not limited to:        Lab Units 06/04/25  1829 05/12/25  0457 05/08/25  0609   CREATININE mg/dL 0.84 0.77 0.78     Results from last 7 days   Lab Units 06/04/25  1829   CREATININE mg/dL 0.84         Lab Results   Component Value Date    K 3.8 06/04/2025     Lab Results   Component Value Date    HGBA1C 5.9 (H) 03/28/2025     Lab Results   Component Value Date    FVW6QLMGWXDG 3.749 04/10/2025     Lab Results   Component Value Date    LDLCALC 68 03/28/2025     Lab Results   Component Value Date    BNP 78 11/30/2024      No results found for: \"NTBNP\"       Home scale dry weight 220lbs  When decompensated she has been 227lbs    TODAY'S PLAN:     06/10/25  Warm, euvolemic  Outside of ED visit, No new cardiac complaints, feels generally well  Active  +chronic productive cough 'chunky' green sputum, no fevers > fu PCP  In ED for CP ion setting of recent abx, major life stressors, heavy exertion in garden preceding event; minimal response to inhalers and SL nitro at home; ED evaluation unrevealing.  BP acceptable  No recent lasix requirement    Can up Anti anginals in case any coronary contribution  On imdur 60 qd > increase to 90 qd    Has low iron stores - rec IV iron, fu PCP for evaluation    Cw prn nitro SL, no recent use    Gdmt below  Limited by hypotension in past, her regimen has been interrupted intermittently due to hypotension in 2024  No changes today  No MR    On AC+plavix  On high intens statin    On tikosyn per EP  Recent QTc acceptable 6/4/25    Advised healthy diet, exercise/rehab as " able    Follow up:  With me in 3 mo, or sooner if symptoms evolve  In addition to follow up with their other medical providers    Key info from my prior notes:    We have discussed some of her risks with major back surgery. She understands there is some level of risk including recurrent MI and even death that we cannot entirely mitigate.    Follows with sleep medicine, planned on Tx for POP, has not started yet per prior notes - advised begin planned Tx as soon as able on multiple occasions     She usually takes lasix 40 bid with self titration for weight gain and SOB. She is completing 4 day burst of lasix 80 bid for 7 lbs weight gain over 5 days (no diet indiscretion). Then she will return to 40 bid.  She requests to reattempt jardiance now, which will also have some diuretic effect. She felt well on this drug in past.  Extensive discussions about risks vs benefits as previously documented. She has had UTI in past causing sglt2i cessation, she recognizes risks and wishes to reattempt 1/2025.    Strict RTC precautions given  Fu in 2 weeks  She will call in 4-5 days with symptom check in  Low threshold repeat echo  Doubt new PE but monitor closely    Home weight up 217 > 220lbs today, feels mild leg swelling, bloating; has been much more SOB x 3 days which began suddenly - previously felt very well for first few days after recent discharge (had elective tiksoyn initiation admit). Has ongoing unremitting LUE numbness from inner biceps area to hand (not more proximally) - ongoing x days after moved boxes with grandkids in attic during weekend, up to 40lbs.  No new chest pain  No syncope  Worsening hypotension  Minimal salt intake, drinks about 50-55 oz water daily she says  She was treated for yeast infx x 3days per PCP, she says her symptoms entirely resolved, no new pain, no fevers.   She does note she is recovering from sinusitis.    Messaged EP team today regarding QTc trend  I cannot see their ECG from today in  muse or epic media tab, priors borderline  Fu EP for tikosyn plan    Will need to back off gdmt at least for now 2/2 hypotension and her Sx, reductions as below    Now seeing nutritionist, no longer doing extreme caloric restriction to 800 cals per day, as she was in recent past  Hydrating better    She has been consuming only 800 calories a day for 1 month or longer in an extreme attempt to lose weight > advised against this. Nutrition referral given today.    Trial imdur for any component of angina  Then 1 week later up entresto and fu BMP afterwards; this will also help with volume management  Cw lasix 20 qd standing for now, which is recent increase from prior    Warm, euvolemic  Has increased lasix 20 prn to qdaily standing for past 1 week for increased weight, mild sob, mild edema of feet - no major improvement.  Today is last minute urgent visit for ED visit 8/25 for CP in setting of home  transiently, ACS ruled out. Then 8/26 yesterday had episode of vomiting, faintness, no LOC, no chest pain, has felt better since then. No ICD shocks, no palpitations. Did not improve with meclizine. Normal-higher blood sugars at home recently/during events.  Very rare opioid use she says for her back pain issues, none since 1-2 weeks ago    Follows GI    Discussed therapeutic lifestyle changes, low-moderate intensity exercise, maintain acceptable hydration 64 oz water daily, salt restrict, Mediterranean vs DASH diet, weight loss  Avoid nsaids    Further review of return to work next week  Safest plan would be no driving x 3 mo - resume 6/2024 if remains stable    Pharmacotherapies / Neurohormonal Blockade:  --Beta Blocker: metoprolol succinate 50 qd  --ARNi / ACEi / ARB: entresto 49/51 bid > pause 12/2/24 for hypotension (near term resumption planned)>resume entresto 49/51 bid 12/13/24 (low threshold reduce dose)  --Aldosterone Antagonist: aldactone 25 qd > paused 12/2/24 for hypotension, consider resumption  future  --SGLT2 Inhibitor: jardiance 25 qd for HF and DM > subsequently Sglt2i stopped in early 2024 2/2 yeast infx>9/10/24 Re-attempt lower sglt2i dose now, jardiance 10 qd, long discussion risks vs benefits and this is pt wish> DC 11/2024 2/2 recurrent yeast infx (may consider repeat re-attempt future, pt indicates desire for this 12/2/24) > plan above, pt requests reattempt 1/3/25 and we resumed 25 qd > refilled at 10 qd 6/10/25    --Diuretic: lasix 40 bid, potassium 20 qd > she has moved to as needed for both as of 2025  Long discussion about evidence of worsening HF, when to self uptitrate home diuretic and call cardiology office     Sudden Cardiac Death Risk Reduction:  --Medtronic dual chamber ICD in situ since 03/2024 (secondary prevention).   5/2025  MDT DUAL ICD (MVP ON) / ACTIVE SYSTEM IS MRI CONDITIONAL   CARELINK TRANSMISSION: BATTERY VOLTAGE ADEQUATE (11.7 YRS). AP-16%, <0.1%. ALL AVAILABLE LEAD PARAMETERS WITHIN NORMAL LIMITS. NO SIGNIFICANT HIGH RATE EPISODES. OPTI-VOL WITHIN NORMAL LIMITS. NORMAL DEVICE FUNCTION.   Electrical Resynchronization:  --Candidacy for BiV device: narrow QRS.   Advanced Therapies: Will continue to monitor.    Studies:  I have reviewed all pertinent patient data/labs/imaging where available, including but not limited to the below studies. Selected results may be displayed here but comprehensive listing is omitted for note clarity and can be found in the epic chart.    ECG.    Echo.    Stress.    Cath.    HPI:   64 y.o. female PMH and acute problems listed later in this note (a partial list may also be included within 'assessment' section) p/w HF fu.  I first met Vesna Langston on 5/6/24.  No new CP/SOB/dizziness/palpitations/syncope.  No new fatigue.  No new unintentional weight changes.  No new leg swelling, PND, pillow orthopnea.  No new fevers, chills, cough, nausea, vomiting, diarrhea, dysuria.      Interval History:  As noted in 'plan' section above and prior epic chart  notes.    No new dizziness/palpitations/syncope.  No new fatigue.  No new PND, pillow orthopnea.  No new fevers, chills, cough, nausea, vomiting, diarrhea, dysuria.    Past Medical History:   Diagnosis Date    Acute respiratory insufficiency 03/22/2024    Allergic     Anxiety 4/24    Arrhythmia 3/22/24    Atrial fibrillation (HCC) 3/22/24    Back pain 2022    Bradycardia 3/22/24    BRCA1 negative     BRCA2 negative     Breast cyst 2000    Chronic HFrEF (heart failure with reduced ejection fraction) ([facility])     Clotting disorder (HCC) 4/1/24    Coronary artery disease     COVID-19 virus infection 11/28/2022    Depression 4/24    Diabetes mellitus ([facility])     Disease of thyroid gland 4/09/2024    Environmental and seasonal allergies 1/1/1960    GERD (gastroesophageal reflux disease) 10/09    GI (gastrointestinal bleed) 4/1/24    Heart disease 3/24    History of placement of internal cardiac defibrillator 3/27/24    Hyperlipidemia     Hypoglycemia     ICD (implantable cardioverter-defibrillator) in place     Ischemic cardiomyopathy     Lactic acidosis 03/22/2024    Migraine 1980    Morning headache 1980    Myocardial infarction ([facility]) 3/22/2024    Nausea & vomiting 04/03/2025    Otitis media 1966    Pacemaker 3/27/24    Peptic ulceration 4/2024    Seasonal allergies     Sinus problem 1970    ST elevation myocardial infarction involving left anterior descending (LAD) coronary artery ([facility]) 03/22/2024    Tobacco abuse     Visual impairment 1967     Patient Active Problem List    Diagnosis Date Noted    Vitamin D deficiency 05/09/2025    Acute pain 05/06/2025    GERD (gastroesophageal reflux disease) 05/06/2025    Hypothyroid 05/06/2025    Anemia 05/02/2025    Status post lumbar spinal fusion 05/01/2025    Other spondylosis with radiculopathy, lumbar region 03/10/2025    Stage 2 chronic kidney disease 01/03/2025    Other pulmonary embolism without acute cor pulmonale, unspecified chronicity ([facility]) 01/03/2025    Acute on chronic  systolic (congestive) heart failure ([facility]) 01/03/2025    Obesity, morbid ([facility]) 01/03/2025    T2DM (type 2 diabetes mellitus) ([facility]) 01/03/2025    Primary osteoarthritis of right knee 10/08/2024    History of torn meniscus of right knee 10/08/2024    Chronic pain of right knee 10/08/2024    Localized edema 10/02/2024    POP (obstructive sleep apnea) 09/23/2024    Class 1 obesity due to excess calories with serious comorbidity and body mass index (BMI) of 34.0 to 34.9 in adult 08/27/2024    History of pulmonary embolism 05/07/2024    History of gastric ulcer 05/07/2024    Acquired hypothyroidism 04/19/2024    Constipation 04/10/2024    Type 2 diabetes mellitus, without long-term current use of insulin ([facility]) 04/07/2024    S/P ICD (internal cardiac defibrillator) procedure 03/27/2024    Chronic low back pain 03/25/2024    HFrEF (heart failure with reduced ejection fraction) ([facility]) 03/25/2024    Ischemic cardiomyopathy 03/24/2024    CAD (coronary artery disease) 03/23/2024    S/P coronary artery stent placement 03/23/2024    Atrial flutter, paroxysmal ([facility]) 03/22/2024    Mixed hyperlipidemia 03/22/2024    History of tobacco abuse 03/22/2024    History of sustained ventricular tachycardia 03/22/2024    Back pain 07/31/2022    Sciatica of left side 07/31/2022       ROS:  10 point ROS negative except as specified in HPI/interval history    Allergies   Allergen Reactions    Fish-Derived Products - Food Allergy Anaphylaxis     Cannot have all seafood products    Shellfish-Derived Products - Food Allergy Anaphylaxis    Azithromycin Hives and Rash     Current Outpatient Medications   Medication Instructions    acetaminophen (TYLENOL) 650 mg, Oral, Every 6 hours PRN    albuterol (ProAir HFA) 90 mcg/act inhaler 2 puffs, Inhalation, Every 6 hours PRN    apixaban (ELIQUIS) 5 mg, Oral, 2 times daily    atorvastatin (LIPITOR) 80 mg, Oral, Every evening    clopidogrel (PLAVIX) 75 mg, Oral, Daily    diphenhydrAMINE (BENADRYL) 25 mg, Every  6 hours PRN    dofetilide (TIKOSYN) 125 mcg, Oral, Every 12 hours scheduled    Empagliflozin (JARDIANCE) 10 mg, Oral, Every morning    Entresto 49-51 MG TABS 1 tablet, Oral, 2 times daily    ergocalciferol (VITAMIN D2) 50,000 Units, Oral, Weekly    ferrous gluconate (FERGON) 324 mg, Oral, Every other day    fluticasone (FLONASE) 50 mcg/act nasal spray 1 spray, Nasal, Daily    furosemide (LASIX) 40 mg, Oral, As needed    HYDROcodone-acetaminophen (NORCO) 5-325 mg per tablet 1 tablet, Oral, Every 8 hours PRN, May cause drowsiness and dizziness. This medication does contain tylenol/acetaminophen, so will need to limit any supplemental tylenol intake. Do not exceed 3000 mg (3g) of tylenol/acetaminophen every 24 hours.    isosorbide mononitrate (IMDUR) 90 mg, Oral, Daily, 30 mg in the am and 30 mg in the pm    levothyroxine (SYNTHROID) 75 mcg, Oral, Daily    meclizine (ANTIVERT) 25 mg, Oral, Every 8 hours PRN    metoprolol succinate (TOPROL-XL) 50 mg, Oral, Daily at bedtime    Multiple Vitamin (multivitamin) tablet 1 tablet, Daily    oxyCODONE (ROXICODONE) 5 mg, Oral, Every 6 hours PRN, Patient is taking as needed     pantoprazole (PROTONIX) 40 mg, Oral, 2 times daily before meals    potassium chloride (Klor-Con M20) 20 mEq tablet 20 mEq, Oral, As needed    pregabalin (LYRICA) 75 mg, Oral, 3 times daily    semaglutide (0.25 or 0.5 mg/dose) (OZEMPIC (0.25 OR 0.5 MG/DOSE)) 0.5 mg, Subcutaneous, Every 7 days, 0.25 mg under the skin every 7 days for 4 doses (28 days), THEN 0.5 mg under the skin every 7 days      Social History     Socioeconomic History    Marital status:      Spouse name: Not on file    Number of children: 3    Years of education: Not on file    Highest education level: Not on file   Occupational History    Not on file   Tobacco Use    Smoking status: Former     Current packs/day: 0.00     Average packs/day: 1 pack/day for 24.2 years (24.2 ttl pk-yrs)     Types: Cigarettes     Start date: 1/1/2000      Quit date: 3/22/2024     Years since quittin.2     Passive exposure: Never    Smokeless tobacco: Never    Tobacco comments:     Smoked 0.5-1 ppd; quit in 2024.    Vaping Use    Vaping status: Never Used   Substance and Sexual Activity    Alcohol use: Not Currently     Alcohol/week: 1.0 standard drink of alcohol     Types: 1 Shots of liquor per week     Comment: Every 2or 3months    Drug use: Never    Sexual activity: Not Currently     Partners: Male     Birth control/protection: Post-menopausal   Other Topics Concern    Not on file   Social History Narrative    Not on file     Social Drivers of Health     Financial Resource Strain: Low Risk  (2023)    Received from [facility]    Overall Financial Resource Strain (CARDIA)     Difficulty of Paying Living Expenses: Not hard at all   Food Insecurity: No Food Insecurity (2025)    Nursing - Inadequate Food Risk Classification     Worried About Running Out of Food in the Last Year: Never true     Ran Out of Food in the Last Year: Never true     Ran Out of Food in the Last Year: Never true   Transportation Needs: No Transportation Needs (2025)    Nursing - Transportation Risk Classification     Lack of Transportation: Not on file     Lack of Transportation: No   Physical Activity: Not on file   Stress: No Stress Concern Present (2023)    Received from [facility]    [employer] [place] [employer] - Occupational Stress Questionnaire     Feeling of Stress : Not at all   Social Connections: Unknown (2024)    Received from "[employer]    Social Connections     How often do you feel lonely or isolated from those around you? (Adult - for ages 18 years and over): Not on file   Intimate Partner Violence: Unknown (2025)    Nursing IPS     Feels Physically and Emotionally Safe: Not on file     Physically Hurt by Someone: Not on file     Humiliated or Emotionally Abused by Someone: Not on file      "Physically Hurt by Someone: No     Hurt or Threatened by Someone: No   Housing Stability: Unknown (2025)    Nursing: Inadequate Housing Risk Classification     Has Housing: Not on file     Worried About Losing Housing: Not on file     Unable to Get Utilities: Not on file     Unable to Pay for Housing in the Last Year: No     Has Housin     Family History   Adopted: Yes   Problem Relation Name Age of Onset    Depression Mother Unknown     Sleep apnea Mother Unknown     Heart disease Father Unknown     Snoring Father Unknown     Restless legs syndrome Father Unknown     OCD Daughter       Physical Exam:  Vitals:    06/10/25 1557   BP: 110/62   BP Location: Left arm   Patient Position: Sitting   Cuff Size: Standard   Pulse: 79   SpO2: 98%   Weight: 96.7 kg (213 lb 1.6 oz)   Height: 5' 8\" (1.727 m)     Constitutional: NAD, non toxic  Ears/nose/mouth/throat: atraumatic  CV: RRR, nl S1S2, no murmurs/rubs/gallups, no JVD, no HJR  Resp: CTABL  GI: Soft, NTND  MSK: no swollen joints in exposed areas  Extr: No edema, warm LE  Pysche: Normal affect  Neuro: appropriate in conversation  Skin: dry and intact in exposed areas    Labs & Results:  Lab Results   Component Value Date    SODIUM 137 2025    K 3.8 2025     2025    CO2 22 2025    BUN 13 2025    CREATININE 0.84 2025    GLUC 140 2025    CALCIUM 9.7 2025     Lab Results   Component Value Date    WBC 9.73 2025    HGB 10.8 (L) 2025    HCT 36.4 2025    MCV 83 2025     2025       Counseling / Coordination of Care  Greater than 50% of total time was spent with the patient and / or family counseling and / or coordination of care.  We discussed diagnoses, most recent studies, tests and any changes in treatment plan.    Thank you for the opportunity to participate in the care of this patient.    Vitor Bell MD  Attending Physician  Advanced Heart Failure and Transplant Cardiology  . " [facility]

## 2025-06-11 DIAGNOSIS — I25.118 CORONARY ARTERY DISEASE OF NATIVE HEART WITH STABLE ANGINA PECTORIS, UNSPECIFIED VESSEL OR LESION TYPE (HCC): ICD-10-CM

## 2025-06-11 DIAGNOSIS — I25.118 CORONARY ARTERY DISEASE OF NATIVE HEART WITH STABLE ANGINA PECTORIS, UNSPECIFIED VESSEL OR LESION TYPE (HCC): Primary | ICD-10-CM

## 2025-06-11 RX ORDER — ISOSORBIDE MONONITRATE 30 MG/1
90 TABLET, EXTENDED RELEASE ORAL DAILY
Qty: 90 TABLET | Refills: 17 | Status: SHIPPED | OUTPATIENT
Start: 2025-06-11 | End: 2025-06-11 | Stop reason: SDUPTHER

## 2025-06-11 RX ORDER — ISOSORBIDE MONONITRATE 30 MG/1
90 TABLET, EXTENDED RELEASE ORAL DAILY
Qty: 90 TABLET | Refills: 17 | OUTPATIENT
Start: 2025-06-11 | End: 2026-12-03

## 2025-06-11 RX ORDER — ISOSORBIDE MONONITRATE 30 MG/1
90 TABLET, EXTENDED RELEASE ORAL DAILY
Qty: 90 TABLET | Refills: 1 | Status: ON HOLD | OUTPATIENT
Start: 2025-06-11 | End: 2026-12-03

## 2025-06-13 ENCOUNTER — TELEPHONE (OUTPATIENT)
Dept: CARDIOLOGY CLINIC | Facility: CLINIC | Age: 65
End: 2025-06-13

## 2025-06-13 NOTE — TELEPHONE ENCOUNTER
----- Message from Maureen CHASE sent at 6/13/2025  7:44 AM EDT -----  Regarding: opti-vol crossed  Pts opti-vol crossed 5/17, pt takes lasix, klor-con, entresto, metoprolol succ, tikosyn, eliquis, plavix, imdur. Ef: 40-45% (echo 10/8/24).Thanks,~Maureen NON-BILLABLE CARELINK TRANSMISSION: BATTERY VOLTAGE ADEQUATE (11.6 YRS). AP: 15.1%. : <0.1% (MVP-ON). ALL AVAILABLE LEAD PARAMETERS WITHIN NORMAL LIMITS. NO SIGNIFICANT HIGH RATE EPISODES. OPTI-VOL FLUID THRESHOLD CROSSED 5/17. PT TAKES LASIX, KLOR-CON, ENTRESTO, METOPROLOL SUCC, TIKOSYN, ELIQUIS, PLAVIX, IMDUR. EF: 40-45% (ECHO 10/8/24).  TASK HF TEAM. NORMAL DEVICE FUNCTION. CH

## 2025-06-13 NOTE — TELEPHONE ENCOUNTER
Spoke with patient.  No weight gain, MICHELLE, possibly minimal abdominal bloating, positive for SOB but patient also has URI with productive cough, finished ABX.  No other cardiac symptoms.      I instructed patient to go to Urgent Care or ER this weekend if she feels her symptoms worsen.     On reviewing OptiVol, looks as though increase started when patient had her back surgery.

## 2025-06-14 ENCOUNTER — RESULTS FOLLOW-UP (OUTPATIENT)
Dept: NON INVASIVE DIAGNOSTICS | Facility: HOSPITAL | Age: 65
End: 2025-06-14

## 2025-06-14 ENCOUNTER — OFFICE VISIT (OUTPATIENT)
Dept: URGENT CARE | Facility: CLINIC | Age: 65
End: 2025-06-14
Payer: COMMERCIAL

## 2025-06-14 ENCOUNTER — APPOINTMENT (OUTPATIENT)
Dept: RADIOLOGY | Facility: CLINIC | Age: 65
End: 2025-06-14
Payer: COMMERCIAL

## 2025-06-14 VITALS
TEMPERATURE: 96.9 F | RESPIRATION RATE: 18 BRPM | HEART RATE: 71 BPM | HEIGHT: 68 IN | SYSTOLIC BLOOD PRESSURE: 96 MMHG | BODY MASS INDEX: 32.28 KG/M2 | WEIGHT: 213 LBS | OXYGEN SATURATION: 99 % | DIASTOLIC BLOOD PRESSURE: 62 MMHG

## 2025-06-14 DIAGNOSIS — M54.32 SCIATICA OF LEFT SIDE: Primary | ICD-10-CM

## 2025-06-14 DIAGNOSIS — D64.9 ANEMIA: ICD-10-CM

## 2025-06-14 DIAGNOSIS — R05.1 ACUTE COUGH: Primary | ICD-10-CM

## 2025-06-14 DIAGNOSIS — R05.1 ACUTE COUGH: ICD-10-CM

## 2025-06-14 DIAGNOSIS — J06.9 VIRAL UPPER RESPIRATORY TRACT INFECTION: ICD-10-CM

## 2025-06-14 PROCEDURE — 87798 DETECT AGENT NOS DNA AMP: CPT

## 2025-06-14 PROCEDURE — 71046 X-RAY EXAM CHEST 2 VIEWS: CPT

## 2025-06-14 PROCEDURE — S9083 URGENT CARE CENTER GLOBAL: HCPCS

## 2025-06-14 PROCEDURE — G0382 LEV 3 HOSP TYPE B ED VISIT: HCPCS

## 2025-06-14 RX ORDER — SULFAMETHOXAZOLE AND TRIMETHOPRIM 800; 160 MG/1; MG/1
1 TABLET ORAL EVERY 12 HOURS SCHEDULED
Qty: 28 TABLET | Refills: 0 | Status: ON HOLD | OUTPATIENT
Start: 2025-06-14 | End: 2025-06-28

## 2025-06-14 NOTE — PATIENT INSTRUCTIONS
You are being tested for pertussis. Start antibiotics. If the test is negative you may stop the antibiotics. Please follow with PCP. If negative you should be seen by cardiology for further evaluation of cough and fluid detected. May be related to fluid overload.  No pneumonia on xray.

## 2025-06-14 NOTE — PROGRESS NOTES
[facility]        NAME: Vesna Langston is a 65 y.o. female  : 1960    MRN: [id number]  DATE: 2025  TIME: 3:57 PM    Assessment and Plan   Acute cough [R05.1]  1. Acute cough  Bordetella pertussis / parapertussis PCR    XR chest pa and lateral    Bordetella pertussis / parapertussis PCR    sulfamethoxazole-trimethoprim (BACTRIM DS) 800-160 mg per tablet            Patient Instructions       Follow up with PCP in 3-5 days.Answers submitted by the patient for this visit:  Shortness of Breath Questionnaire (Submitted on 2025)  Chief Complaint: Shortness of breath  Chronicity: recurrent  hemoptysis: No  sputum production: Yes  PND: Yes  syncope: No  claudication: No  leg pain: No  coryza: Yes  swollen glands: No    Proceed to  ER if symptoms worsen.    Chief Complaint     Chief Complaint   Patient presents with    Cough     Cough X 1 month, Had antibiotics at that time. Cough productive of green mucous. Had ICD checked yesterday which showed fluid in left lung. Was told if not feeling better to be seen today         History of Present Illness       Patient is a 65-year-old female who presents to the office today for a continued cough since the middle of May.  She reports that has green mucus.  At the time she was put on antibiotics for what she thought was a sinus infection and then she went to the ER in which they did a CTA of the chest and found nothing.  Reports yesterday was seen by cardiology who called her and told her that she had elevated fluid levels in her lungs and that if her symptoms were worsening she should proceed to the urgent care this weekend for evaluation but they did not advise that she take any increased Lasix.  She denies leg swelling.  She does report that it has been gradually getting worse and she is having issues with activity including only being able to walk half of what she used to on the treadmill before becoming short of breath.    Shortness of Breath  The  "current episode started 1 to 4 weeks ago. The problem occurs daily. The problem has been gradually worsening since onset. Associated symptoms include orthopnea. Pertinent negatives include no chest pain, leg swelling, rhinorrhea, sore throat or wheezing. The symptoms are aggravated by exercise and any activity.       Review of Systems   Review of Systems   Constitutional:  Negative for fever.   HENT:  Negative for ear pain, rhinorrhea and sore throat.    Respiratory:  Positive for shortness of breath. Negative for wheezing.    Cardiovascular:  Positive for orthopnea. Negative for chest pain and leg swelling.   Gastrointestinal:  Positive for vomiting. Negative for abdominal pain.   Musculoskeletal:  Negative for neck pain.   Skin:  Negative for rash.   Neurological:  Negative for headaches.         Current Medications     Current Medications[1]    Current Allergies     Allergies as of 06/14/2025 - Reviewed 06/14/2025   Allergen Reaction Noted    Fish-derived products - food allergy Anaphylaxis 10/01/2024    Shellfish-derived products - food allergy Anaphylaxis 06/10/2019    Azithromycin Hives and Rash 10/10/2012            The following portions of the patient's history were reviewed and updated as appropriate: allergies, current medications, past family history, past medical history, past social history, past surgical history and problem list.     Past Medical History[2]    Past Surgical History[3]    Family History[4]      Medications have been verified.        Objective   BP 96/62   Pulse 71   Temp (!) 96.9 °F (36.1 °C)   Resp 18   Ht 5' 8\" (1.727 m)   Wt 96.6 kg (213 lb)   SpO2 99%   BMI 32.39 kg/m²        Physical Exam     Physical Exam                   [1]   Current Outpatient Medications:     acetaminophen (TYLENOL) 325 mg tablet, Take 2 tablets (650 mg total) by mouth every 6 (six) hours as needed for mild pain, Disp: , Rfl:     albuterol (ProAir HFA) 90 mcg/act inhaler, Inhale 2 puffs every 6 (six) " hours as needed for wheezing, Disp: 8.5 g, Rfl: 0    apixaban (ELIQUIS) 5 mg, Take 1 tablet (5 mg total) by mouth 2 (two) times a day, Disp: 180 tablet, Rfl: 1    atorvastatin (LIPITOR) 80 mg tablet, TAKE 1 TABLET BY MOUTH EVERY DAY IN THE EVENING, Disp: 90 tablet, Rfl: 1    clopidogrel (PLAVIX) 75 mg tablet, Take 1 tablet (75 mg total) by mouth daily, Disp: 90 tablet, Rfl: 3    diphenhydrAMINE (BENADRYL) 25 mg capsule, Take 25 mg by mouth every 6 (six) hours as needed for itching, Disp: , Rfl:     dofetilide (TIKOSYN) 125 mcg capsule, Take 1 capsule (125 mcg total) by mouth every 12 (twelve) hours, Disp: 180 capsule, Rfl: 3    Empagliflozin (Jardiance) 10 MG TABS tablet, Take 1 tablet (10 mg total) by mouth every morning, Disp: 90 tablet, Rfl: 5    Entresto 49-51 MG TABS, Take 1 tablet by mouth 2 (two) times a day, Disp: 180 tablet, Rfl: 5    ergocalciferol (VITAMIN D2) 50,000 units, Take 1 capsule (50,000 Units total) by mouth once a week, Disp: 12 capsule, Rfl: 1    ferrous gluconate (FERGON) 324 mg tablet, Take 1 tablet (324 mg total) by mouth every other day, Disp: 15 tablet, Rfl: 0    fluticasone (FLONASE) 50 mcg/act nasal spray, 1 spray into each nostril daily, Disp: 9.9 mL, Rfl: 0    furosemide (LASIX) 40 mg tablet, Take 1 tablet (40 mg total) by mouth if needed (if worse shortness of breath or weight up 3lbs), Disp: 60 tablet, Rfl: 5    HYDROcodone-acetaminophen (NORCO) 5-325 mg per tablet, Take 1 tablet by mouth every 8 (eight) hours as needed for pain May cause drowsiness and dizziness. This medication does contain tylenol/acetaminophen, so will need to limit any supplemental tylenol intake. Do not exceed 3000 mg (3g) of tylenol/acetaminophen every 24 hours. Max Daily Amount: 3 tablets, Disp: 30 tablet, Rfl: 0    isosorbide mononitrate (IMDUR) 30 mg 24 hr tablet, Take 3 tablets (90 mg total) by mouth daily, Disp: 90 tablet, Rfl: 1    levothyroxine (Synthroid) 75 mcg tablet, Take 1 tablet (75 mcg total) by  mouth daily, Disp: 90 tablet, Rfl: 3    meclizine (ANTIVERT) 25 mg tablet, Take 1 tablet (25 mg total) by mouth every 8 (eight) hours as needed for dizziness, Disp: 15 tablet, Rfl: 0    metoprolol succinate (TOPROL-XL) 25 mg 24 hr tablet, Take 2 tablets (50 mg total) by mouth daily at bedtime, Disp: 180 tablet, Rfl: 5    Multiple Vitamin (multivitamin) tablet, Take 1 tablet by mouth in the morning., Disp: , Rfl:     pantoprazole (PROTONIX) 40 mg tablet, TAKE 1 TABLET BY MOUTH 2 TIMES A DAY BEFORE MEALS., Disp: 180 tablet, Rfl: 1    potassium chloride (Klor-Con M20) 20 mEq tablet, Take 1 tablet (20 mEq total) by mouth if needed (on lasix days), Disp: 90 tablet, Rfl: 1    pregabalin (LYRICA) 75 mg capsule, Take 75 mg by mouth 3 (three) times a day, Disp: , Rfl:     semaglutide, 0.25 or 0.5 mg/dose, (Ozempic, 0.25 or 0.5 MG/DOSE,) 2 mg/3 mL injection pen, Inject 0.75 mL (0.5 mg total) under the skin every 7 days 0.25 mg under the skin every 7 days for 4 doses (28 days), THEN 0.5 mg under the skin every 7 days, Disp: 3 mL, Rfl: 1    sulfamethoxazole-trimethoprim (BACTRIM DS) 800-160 mg per tablet, Take 1 tablet by mouth every 12 (twelve) hours for 14 days, Disp: 28 tablet, Rfl: 0    oxyCODONE (ROXICODONE) 5 immediate release tablet, Take 5 mg by mouth every 6 (six) hours as needed for moderate pain Patient is taking as needed, Disp: , Rfl:   [2]   Past Medical History:  Diagnosis Date    Acute respiratory insufficiency 03/22/2024    Allergic     Anxiety 4/24    Arrhythmia 3/22/24    Atrial fibrillation (HCC) 3/22/24    Back pain 2022    Bradycardia 3/22/24    BRCA1 negative     BRCA2 negative     Breast cyst 2000    Chronic HFrEF (heart failure with reduced ejection fraction) (HCC)     Clotting disorder (HCC) 4/1/24    Coronary artery disease     COVID-19 virus infection 11/28/2022    Depression 4/24    Diabetes mellitus (HCC)     Disease of thyroid gland 4/09/2024    Environmental and seasonal allergies 1/1/1960     GERD (gastroesophageal reflux disease) 10/09    GI (gastrointestinal bleed) 24    Heart disease 3/24    History of placement of internal cardiac defibrillator 3/27/24    Hyperlipidemia     Hypoglycemia     ICD (implantable cardioverter-defibrillator) in place     Ischemic cardiomyopathy     Lactic acidosis 2024    Migraine 1980    Morning headache 1980    Myocardial infarction (HCC) 3/22/2024    Nausea & vomiting 2025    Otitis media 1966    Pacemaker 3/27/24    Peptic ulceration 2024    Seasonal allergies     Sinus problem 1970    ST elevation myocardial infarction involving left anterior descending (LAD) coronary artery ([facility]) 2024    Tobacco abuse     Visual impairment    [3]   Past Surgical History:  Procedure Laterality Date    ADENOIDECTOMY      APPENDECTOMY      APPENDECTOMY LAPAROSCOPIC N/A 2024    Procedure: APPENDECTOMY LAPAROSCOPIC;  Surgeon: Lauryn Ullrich, DO;  [facility];  Service: General    BACK SURGERY      BACK SURGERY      BREAST EXCISIONAL BIOPSY Right 2000    cyst removed- benign    CARDIAC CATHETERIZATION N/A 2024    Procedure: Cardiac pci;  Surgeon: Gabriel Myers MD;  [facility];  Service: Cardiology    CARDIAC CATHETERIZATION N/A 2024    Procedure: Cardiac Coronary Angiogram;  Surgeon: Gabriel Myres MD;  [facility];  Service: Cardiology    CARDIAC CATHETERIZATION N/A 2024    Procedure: Cardiac PCI AMI;  Surgeon: Gabriel Myers MD;  [facility];  Service: Cardiology    CARDIAC CATHETERIZATION N/A 2024    Procedure: Cardiac IVUS;  Surgeon: Gabriel Myers MD;  [facility];  Service: Cardiology    CARDIAC DEFIBRILLATOR PLACEMENT      CARDIAC ELECTROPHYSIOLOGY PROCEDURE N/A 2024    Procedure: Cardiac icd implant;  Surgeon: Jeffy Lama MD;  [facility];  Service: Cardiology     SECTION      COLONOSCOPY      ECTOPIC PREGNANCY  SURGERY      INSERT / REPLACE / REMOVE PACEMAKER      LUMBAR FUSION Bilateral 05/01/2025    Procedure: Navigated revision L3-S1 laminectomy/decompression, revision L3-S1 posterior instrumented spinal fusion with TLIF;  Surgeon: Charles Mcrae MD;  Location:  MAIN OR;  Service: Orthopedics    MASS EXCISION Left 10/18/2024    Procedure: EXCISION BIOPSY TISSUE LESION/MASS UPPER EXTREMITY - Left index finger;  Surgeon: Leandro Campos MD;  Location:  MAIN OR;  Service: Orthopedics    SEPTOPLASTY      SPINE SURGERY  8/17/23    TONSILLECTOMY      TONSILLECTOMY AND ADENOIDECTOMY  1964    TRIGGER POINT INJECTION      UPPER GASTROINTESTINAL ENDOSCOPY  4/07/24   [4]   Family History  Adopted: Yes   Problem Relation Name Age of Onset    Depression Mother Unknown     Sleep apnea Mother Unknown     Heart disease Father Unknown     Snoring Father Unknown     Restless legs syndrome Father Unknown     OCD Daughter

## 2025-06-14 NOTE — PROGRESS NOTES
[facility]        NAME: Vesna Langston is a 65 y.o. female  : 1960    MRN: [id number]  DATE: 2025  TIME: 3:59 PM    Assessment and Plan   Acute cough [R05.1]  1. Acute cough  Bordetella pertussis / parapertussis PCR    XR chest pa and lateral    Bordetella pertussis / parapertussis PCR    sulfamethoxazole-trimethoprim (BACTRIM DS) 800-160 mg per tablet        Chest x-ray negative for pneumonia.  Will test for pertussis given duration of cough as well as initiate treatment for pertussis given symptoms however patient is allergic to azithromycin therefore will start on Bactrim.  I highly advised the patient contact her cardiologist to follow-up given this excess fluid may be an result of heart failure and not infection.    Patient Instructions     You are being tested for pertussis. Start antibiotics. If the test is negative you may stop the antibiotics. Please follow with PCP. If negative you should be seen by cardiology for further evaluation of cough and fluid detected. May be related to fluid overload.  No pneumonia on xray.  Follow up with PCP in 3-5 days.  Proceed to  ER if symptoms worsen.    Chief Complaint     Chief Complaint   Patient presents with    Cough     Cough X 1 month, Had antibiotics at that time. Cough productive of green mucous. Had ICD checked yesterday which showed fluid in left lung. Was told if not feeling better to be seen today         History of Present Illness       Patient is a 65 year old female who presents to the office today for cough with green sputum. Was on antibiotics recently in may for sinus infection. Went to see NP on  and was told it was URI. Then went to ED on  for chest pain had CTA which was negative. Is having activity intolerance when walking half the distance she used on her treadmill. Saw cardiologist last week and was told only if she has fever. Is taking tylenol for her back.  She reports that yesterday they called her and informed her  that her fluid levels were elevated based on her patch and that she should proceed to the urgent care this weekend if her symptoms are worsening.  They did not advise that she take any Lasix.  They told her that we should not prescribe her an alternative antibiotic at the urgent care for her symptoms.  Her cough has been ongoing since May.    Cough  Associated symptoms include shortness of breath. Pertinent negatives include no chest pain, ear pain, fever, headaches, rash, rhinorrhea, sore throat or wheezing.   Shortness of Breath  The current episode started 1 to 4 weeks ago. The problem occurs daily. The problem has been gradually worsening since onset. Associated symptoms include coughing and orthopnea. Pertinent negatives include no chest pain, leg swelling, rhinorrhea, sore throat or wheezing. The symptoms are aggravated by exercise and any activity.       Review of Systems   Review of Systems   Constitutional:  Negative for fever.   HENT:  Negative for ear pain, rhinorrhea and sore throat.    Respiratory:  Positive for cough and shortness of breath. Negative for wheezing.    Cardiovascular:  Positive for orthopnea. Negative for chest pain and leg swelling.   Gastrointestinal:  Positive for vomiting. Negative for abdominal pain.   Musculoskeletal:  Negative for neck pain.   Skin:  Negative for rash.   Neurological:  Negative for headaches.   All other systems reviewed and are negative.        Current Medications     Current Medications[1]    Current Allergies     Allergies as of 06/14/2025 - Reviewed 06/14/2025   Allergen Reaction Noted    Fish-derived products - food allergy Anaphylaxis 10/01/2024    Shellfish-derived products - food allergy Anaphylaxis 06/10/2019    Azithromycin Hives and Rash 10/10/2012            The following portions of the patient's history were reviewed and updated as appropriate: allergies, current medications, past family history, past medical history, past social history, past  "surgical history and problem list.     Past Medical History[2]    Past Surgical History[3]    Family History[4]      Medications have been verified.        Objective   BP 96/62   Pulse 71   Temp (!) 96.9 °F (36.1 °C)   Resp 18   Ht 5' 8\" (1.727 m)   Wt 96.6 kg (213 lb)   SpO2 99%   BMI 32.39 kg/m²        Physical Exam     Physical Exam  Vitals and nursing note reviewed.   Constitutional:       General: She is not in acute distress.     Appearance: She is obese. She is not ill-appearing or toxic-appearing.   HENT:      Right Ear: Tympanic membrane normal.      Left Ear: Tympanic membrane normal.      Nose: Nose normal.      Mouth/Throat:      Mouth: Mucous membranes are moist.     Cardiovascular:      Rate and Rhythm: Normal rate and regular rhythm.      Pulses: Normal pulses.      Heart sounds: Normal heart sounds.   Pulmonary:      Effort: Pulmonary effort is normal.      Breath sounds: Normal breath sounds.     Skin:     General: Skin is warm.      Capillary Refill: Capillary refill takes less than 2 seconds.     Neurological:      Mental Status: She is alert.                   Answers submitted by the patient for this visit:  Shortness of Breath Questionnaire (Submitted on 6/14/2025)  Chief Complaint: Shortness of breath  Chronicity: recurrent  hemoptysis: No  sputum production: Yes  PND: Yes  syncope: No  claudication: No  leg pain: No  coryza: Yes  swollen glands: No         [1]   Current Outpatient Medications:     acetaminophen (TYLENOL) 325 mg tablet, Take 2 tablets (650 mg total) by mouth every 6 (six) hours as needed for mild pain, Disp: , Rfl:     albuterol (ProAir HFA) 90 mcg/act inhaler, Inhale 2 puffs every 6 (six) hours as needed for wheezing, Disp: 8.5 g, Rfl: 0    apixaban (ELIQUIS) 5 mg, Take 1 tablet (5 mg total) by mouth 2 (two) times a day, Disp: 180 tablet, Rfl: 1    atorvastatin (LIPITOR) 80 mg tablet, TAKE 1 TABLET BY MOUTH EVERY DAY IN THE EVENING, Disp: 90 tablet, Rfl: 1    " clopidogrel (PLAVIX) 75 mg tablet, Take 1 tablet (75 mg total) by mouth daily, Disp: 90 tablet, Rfl: 3    diphenhydrAMINE (BENADRYL) 25 mg capsule, Take 25 mg by mouth every 6 (six) hours as needed for itching, Disp: , Rfl:     dofetilide (TIKOSYN) 125 mcg capsule, Take 1 capsule (125 mcg total) by mouth every 12 (twelve) hours, Disp: 180 capsule, Rfl: 3    Empagliflozin (Jardiance) 10 MG TABS tablet, Take 1 tablet (10 mg total) by mouth every morning, Disp: 90 tablet, Rfl: 5    Entresto 49-51 MG TABS, Take 1 tablet by mouth 2 (two) times a day, Disp: 180 tablet, Rfl: 5    ergocalciferol (VITAMIN D2) 50,000 units, Take 1 capsule (50,000 Units total) by mouth once a week, Disp: 12 capsule, Rfl: 1    ferrous gluconate (FERGON) 324 mg tablet, Take 1 tablet (324 mg total) by mouth every other day, Disp: 15 tablet, Rfl: 0    fluticasone (FLONASE) 50 mcg/act nasal spray, 1 spray into each nostril daily, Disp: 9.9 mL, Rfl: 0    furosemide (LASIX) 40 mg tablet, Take 1 tablet (40 mg total) by mouth if needed (if worse shortness of breath or weight up 3lbs), Disp: 60 tablet, Rfl: 5    HYDROcodone-acetaminophen (NORCO) 5-325 mg per tablet, Take 1 tablet by mouth every 8 (eight) hours as needed for pain May cause drowsiness and dizziness. This medication does contain tylenol/acetaminophen, so will need to limit any supplemental tylenol intake. Do not exceed 3000 mg (3g) of tylenol/acetaminophen every 24 hours. Max Daily Amount: 3 tablets, Disp: 30 tablet, Rfl: 0    isosorbide mononitrate (IMDUR) 30 mg 24 hr tablet, Take 3 tablets (90 mg total) by mouth daily, Disp: 90 tablet, Rfl: 1    levothyroxine (Synthroid) 75 mcg tablet, Take 1 tablet (75 mcg total) by mouth daily, Disp: 90 tablet, Rfl: 3    meclizine (ANTIVERT) 25 mg tablet, Take 1 tablet (25 mg total) by mouth every 8 (eight) hours as needed for dizziness, Disp: 15 tablet, Rfl: 0    metoprolol succinate (TOPROL-XL) 25 mg 24 hr tablet, Take 2 tablets (50 mg total) by  mouth daily at bedtime, Disp: 180 tablet, Rfl: 5    Multiple Vitamin (multivitamin) tablet, Take 1 tablet by mouth in the morning., Disp: , Rfl:     pantoprazole (PROTONIX) 40 mg tablet, TAKE 1 TABLET BY MOUTH 2 TIMES A DAY BEFORE MEALS., Disp: 180 tablet, Rfl: 1    potassium chloride (Klor-Con M20) 20 mEq tablet, Take 1 tablet (20 mEq total) by mouth if needed (on lasix days), Disp: 90 tablet, Rfl: 1    pregabalin (LYRICA) 75 mg capsule, Take 75 mg by mouth 3 (three) times a day, Disp: , Rfl:     semaglutide, 0.25 or 0.5 mg/dose, (Ozempic, 0.25 or 0.5 MG/DOSE,) 2 mg/3 mL injection pen, Inject 0.75 mL (0.5 mg total) under the skin every 7 days 0.25 mg under the skin every 7 days for 4 doses (28 days), THEN 0.5 mg under the skin every 7 days, Disp: 3 mL, Rfl: 1    sulfamethoxazole-trimethoprim (BACTRIM DS) 800-160 mg per tablet, Take 1 tablet by mouth every 12 (twelve) hours for 14 days, Disp: 28 tablet, Rfl: 0    oxyCODONE (ROXICODONE) 5 immediate release tablet, Take 5 mg by mouth every 6 (six) hours as needed for moderate pain Patient is taking as needed, Disp: , Rfl:   [2]   Past Medical History:  Diagnosis Date    Acute respiratory insufficiency 03/22/2024    Allergic     Anxiety 4/24    Arrhythmia 3/22/24    Atrial fibrillation (HCC) 3/22/24    Back pain 2022    Bradycardia 3/22/24    BRCA1 negative     BRCA2 negative     Breast cyst 2000    Chronic HFrEF (heart failure with reduced ejection fraction) (HCC)     Clotting disorder (HCC) 4/1/24    Coronary artery disease     COVID-19 virus infection 11/28/2022    Depression 4/24    Diabetes mellitus (HCC)     Disease of thyroid gland 4/09/2024    Environmental and seasonal allergies 1/1/1960    GERD (gastroesophageal reflux disease) 10/09    GI (gastrointestinal bleed) 4/1/24    Heart disease 3/24    History of placement of internal cardiac defibrillator 3/27/24    Hyperlipidemia     Hypoglycemia     ICD (implantable cardioverter-defibrillator) in place      Ischemic cardiomyopathy     Lactic acidosis 2024    Migraine 1980    Morning headache 1980    Myocardial infarction (HCC) 3/22/2024    Nausea & vomiting 2025    Otitis media 1966    Pacemaker 3/27/24    Peptic ulceration 2024    Seasonal allergies     Sinus problem 1970    ST elevation myocardial infarction involving left anterior descending (LAD) coronary artery ([facility]) 2024    Tobacco abuse     Visual impairment    [3]   Past Surgical History:  Procedure Laterality Date    ADENOIDECTOMY      APPENDECTOMY      APPENDECTOMY LAPAROSCOPIC N/A 2024    Procedure: APPENDECTOMY LAPAROSCOPIC;  Surgeon: Lauryn Ullrich, DO;  [facility];  Service: General    BACK SURGERY      BACK SURGERY      BREAST EXCISIONAL BIOPSY Right 2000    cyst removed- benign    CARDIAC CATHETERIZATION N/A 2024    Procedure: Cardiac pci;  Surgeon: Gabriel Myers MD;  [facility];  Service: Cardiology    CARDIAC CATHETERIZATION N/A 2024    Procedure: Cardiac Coronary Angiogram;  Surgeon: Gabriel Myers MD;  [facility];  Service: Cardiology    CARDIAC CATHETERIZATION N/A 2024    Procedure: Cardiac PCI AMI;  Surgeon: Gabriel Myers MD;  [facility];  Service: Cardiology    CARDIAC CATHETERIZATION N/A 2024    Procedure: Cardiac IVUS;  Surgeon: Gabriel Myers MD;  [facility];  Service: Cardiology    CARDIAC DEFIBRILLATOR PLACEMENT      CARDIAC ELECTROPHYSIOLOGY PROCEDURE N/A 2024    Procedure: Cardiac icd implant;  Surgeon: Jeffy Lama MD;  [facility];  Service: Cardiology     SECTION      COLONOSCOPY      ECTOPIC PREGNANCY SURGERY      INSERT / REPLACE / REMOVE PACEMAKER      LUMBAR FUSION Bilateral 2025    Procedure: Navigated revision L3-S1 laminectomy/decompression, revision L3-S1 posterior instrumented spinal fusion with TLIF;  Surgeon: Charles Mcrae MD;  [facility];  Service: Orthopedics    MASS EXCISION Left 10/18/2024    Procedure: EXCISION BIOPSY TISSUE LESION/MASS UPPER EXTREMITY - Left index finger;  Surgeon: Leandro Campos MD;  Location:  MAIN OR;  Service: Orthopedics    SEPTOPLASTY      SPINE SURGERY  8/17/23    TONSILLECTOMY      TONSILLECTOMY AND ADENOIDECTOMY  1964    TRIGGER POINT INJECTION      UPPER GASTROINTESTINAL ENDOSCOPY  4/07/24   [4]   Family History  Adopted: Yes   Problem Relation Name Age of Onset    Depression Mother Unknown     Sleep apnea Mother Unknown     Heart disease Father Unknown     Snoring Father Unknown     Restless legs syndrome Father Unknown     OCD Daughter

## 2025-06-15 ENCOUNTER — RESULTS FOLLOW-UP (OUTPATIENT)
Dept: URGENT CARE | Facility: CLINIC | Age: 65
End: 2025-06-15

## 2025-06-15 DIAGNOSIS — R55 SYNCOPE AND COLLAPSE: ICD-10-CM

## 2025-06-15 LAB
B PARAPERT DNA UPPER RESP QL NAA+PROBE: NOT DETECTED
B PERT DNA UPPER RESP QL NAA+PROBE: NOT DETECTED

## 2025-06-16 ENCOUNTER — HOSPITAL ENCOUNTER (OUTPATIENT)
Dept: RADIOLOGY | Facility: HOSPITAL | Age: 65
Discharge: HOME | End: 2025-06-16
Attending: ORTHOPAEDIC SURGERY
Payer: COMMERCIAL

## 2025-06-16 ENCOUNTER — OFFICE VISIT (OUTPATIENT)
Dept: OBGYN CLINIC | Facility: HOSPITAL | Age: 65
End: 2025-06-16

## 2025-06-16 VITALS — WEIGHT: 212.96 LBS | BODY MASS INDEX: 32.28 KG/M2 | HEIGHT: 68 IN

## 2025-06-16 DIAGNOSIS — Z98.1 S/P LUMBAR FUSION: Primary | ICD-10-CM

## 2025-06-16 DIAGNOSIS — Z98.1 S/P LUMBAR FUSION: ICD-10-CM

## 2025-06-16 DIAGNOSIS — E55.9 VITAMIN D DEFICIENCY: ICD-10-CM

## 2025-06-16 PROCEDURE — 99024 POSTOP FOLLOW-UP VISIT: CPT | Performed by: ORTHOPAEDIC SURGERY

## 2025-06-16 PROCEDURE — 72100 X-RAY EXAM L-S SPINE 2/3 VWS: CPT

## 2025-06-16 RX ORDER — FERROUS GLUCONATE 324(38)MG
324 TABLET ORAL EVERY OTHER DAY
Qty: 90 TABLET | Refills: 0 | Status: SHIPPED | OUTPATIENT
Start: 2025-06-16

## 2025-06-16 RX ORDER — ALBUTEROL SULFATE 90 UG/1
2 INHALANT RESPIRATORY (INHALATION) EVERY 6 HOURS PRN
Qty: 8.5 G | Refills: 0 | Status: SHIPPED | OUTPATIENT
Start: 2025-06-16

## 2025-06-16 RX ORDER — PREGABALIN 75 MG/1
75 CAPSULE ORAL 3 TIMES DAILY
Qty: 270 CAPSULE | Refills: 1 | Status: SHIPPED | OUTPATIENT
Start: 2025-06-16

## 2025-06-16 RX ORDER — LEVOTHYROXINE SODIUM 75 UG/1
75 TABLET ORAL DAILY
Qty: 90 TABLET | Refills: 1 | Status: SHIPPED | OUTPATIENT
Start: 2025-06-16

## 2025-06-16 NOTE — PROGRESS NOTES
Name: Vesna Langston      : 1960       MRN: [id number]   Encounter Provider: Charles Mcrae MD   Encounter Date: 25  Encounter department: [facility] ORTHOP[facility] ORTHOPEDIC SPINE SURGERY  POST OP NOTE     Vesna Langston  here today s/p navigated revision L3-S1 laminectomy/decompression, revision L3-S1 posterior instrumented spinal fusion with TLIF          Surgery date: 2025    Assessment & Plan  S/P lumbar fusion  See plan below  Orders:    XR spine lumbar 2 or 3 views injury; Future    Ambulatory referral to Physical Therapy; Future         Plan:  Patient was instructed to do the following   -Able to walk as much as tolerated. Continue to use assistive ambulatory device as indicated. LSO brace when ambulating.  -Continue to limit excessive turning, twisting, bending. No lifting >5-10lbs.  -Continue with physical therapy to work on upper and lower body strengthening. Will plan to start formal physical therapy to work on lumbar ROM, strengthening. Continue to maintain lumbar restrictions.   -Able to drive as long as no longer taking narcotics.   -Take pain medication as prescribed if needed. No refills required at today's visit  -Work note provided in chart.   -Follow up in 6 weeks for 3 month post-op visit. Will obtain X-rays  Vesna Langston was instructed to call the office with any concerns or questions.      History of Present Illness    Subjective: Preoperative symptoms were back, lower extremity pain, ambulatory dysfunction. Today, she reports improvement in pre-operative symptoms. Pain overall well controlled. Lower extremity pain resolved. Does have some back soreness and muscle spasms several times a day after increased activity. Planning to order a heat/massage device on [employer]. She reports upper body strengthening exercises at physical therapy with benefit. Has recently started to incorporate lower extremity strengthening exercises as well. Does continue  "with some weakness and baseline left lower extremity numbness, however does report having some return of feeling/tingling in her left big toe. She feels like she is able to stand up straighter and for longer periods of time. Walking has improved, she is walk 0.5 miles on the treadmill without issue. Wearing LSO brace and using cane as needed for ambulation. Has been vacuuming for a few minuets at a time without issue. Patient admits to compliance with activity restrictions.  Denies any fevers, chills, and night sweats.  No cough, no shortness of breath. No chest pain, no palpitations.  No dysphagia.    Post-Op Medications:  Hydrocodone - take as needed  75mg tid Lyrica - taking as prescribed  Resumed plavix and eliquis as prescribed      Physical Exam    Objective:  Ht 5' 8\" (1.727 m)   Wt 96.6 kg (212 lb 15.4 oz)   BMI 32.38 kg/m²     Strength:  Lower Extremity Motor Function     Right  Left    Iliopsoas  5/5  4/5    Quadriceps 5/5 4/5   Tibialis anterior  5/5  4/5    EHL  5/5  3+/5    Gastroc. muscle  5/5  2/5    Heel rise  5/5  2/5    Toe rise  5/5  2/5      Sensation: intact, diminished  DTR: intact  Gait: fluid, non-antalgic. Using cane for ambulation    Posterior lumbar incision healing extremely well. No signs of erythema, discharge, or purulence. There is no appreciable effusion.    Calf: soft, non tender, no swelling or erythema     Imaging:  I have reviewed and independently visualized the imaging studies (06/16/25)  myself and my interpretation is the following: Instrumentation in place and in good alignment.  "

## 2025-06-16 NOTE — LETTER
June 16, 2025     Patient: Vesna Langston  YOB: 1960  Date of Visit: 6/16/2025      To Whom it May Concern:    Vesna Langston is under my professional care. Vesna was seen in my office on 6/16/2025. Vesna should remain out of work until continued physical therapy and re-evalaution at 3 month post-op visit on 7/25/2025.    If you have any questions or concerns, please don't hesitate to call.         Sincerely,          Charles Mcrae MD        CC: No Recipients

## 2025-06-17 ENCOUNTER — RESULTS FOLLOW-UP (OUTPATIENT)
Dept: NON INVASIVE DIAGNOSTICS | Facility: HOSPITAL | Age: 65
End: 2025-06-17

## 2025-06-17 RX ORDER — ERGOCALCIFEROL 1.25 MG/1
50000 CAPSULE, LIQUID FILLED ORAL WEEKLY
Qty: 12 CAPSULE | Refills: 0 | Status: SHIPPED | OUTPATIENT
Start: 2025-06-17

## 2025-06-19 NOTE — TELEPHONE ENCOUNTER
Pt called with an update stating she went to urgent care. She took lasix for 2 days, even though her weight wasn't up, she still lost 5 lbs.  She is feeling better and SOB has improved. She will continue to monitor and will take lasix if symptoms return.   Device check normal

## 2025-06-20 ENCOUNTER — HOSPITAL ENCOUNTER (EMERGENCY)
Facility: HOSPITAL | Age: 65
End: 2025-06-21
Attending: EMERGENCY MEDICINE | Admitting: EMERGENCY MEDICINE
Payer: MEDICARE

## 2025-06-20 ENCOUNTER — NURSE TRIAGE (OUTPATIENT)
Dept: OTHER | Facility: OTHER | Age: 65
End: 2025-06-20

## 2025-06-20 ENCOUNTER — TELEPHONE (OUTPATIENT)
Dept: CARDIOLOGY CLINIC | Facility: CLINIC | Age: 65
End: 2025-06-20

## 2025-06-20 ENCOUNTER — APPOINTMENT (EMERGENCY)
Dept: RADIOLOGY | Facility: HOSPITAL | Age: 65
End: 2025-06-20
Payer: MEDICARE

## 2025-06-20 DIAGNOSIS — R07.9 CHEST PAIN: ICD-10-CM

## 2025-06-20 DIAGNOSIS — R06.02 SOB (SHORTNESS OF BREATH): Primary | ICD-10-CM

## 2025-06-20 DIAGNOSIS — I50.9 CHF (CONGESTIVE HEART FAILURE) (HCC): ICD-10-CM

## 2025-06-20 DIAGNOSIS — I25.5 ISCHEMIC CARDIOMYOPATHY: Chronic | ICD-10-CM

## 2025-06-20 LAB
ALBUMIN SERPL BCG-MCNC: 4.4 G/DL (ref 3.5–5)
ALP SERPL-CCNC: 52 U/L (ref 34–104)
ALT SERPL W P-5'-P-CCNC: 11 U/L (ref 7–52)
ANION GAP SERPL CALCULATED.3IONS-SCNC: 11 MMOL/L (ref 4–13)
APTT PPP: 30 SECONDS (ref 23–34)
AST SERPL W P-5'-P-CCNC: 19 U/L (ref 13–39)
BACTERIA UR QL AUTO: NORMAL /HPF
BASOPHILS # BLD AUTO: 0.06 THOUSANDS/ÂΜL (ref 0–0.1)
BASOPHILS NFR BLD AUTO: 1 % (ref 0–1)
BILIRUB SERPL-MCNC: 0.41 MG/DL (ref 0.2–1)
BILIRUB UR QL STRIP: NEGATIVE
BNP SERPL-MCNC: 45 PG/ML (ref 0–100)
BUN SERPL-MCNC: 17 MG/DL (ref 5–25)
CALCIUM SERPL-MCNC: 9.5 MG/DL (ref 8.4–10.2)
CARDIAC TROPONIN I PNL SERPL HS: 6 NG/L (ref ?–50)
CHLORIDE SERPL-SCNC: 101 MMOL/L (ref 96–108)
CLARITY UR: ABNORMAL
CO2 SERPL-SCNC: 23 MMOL/L (ref 21–32)
COLOR UR: YELLOW
CREAT SERPL-MCNC: 0.84 MG/DL (ref 0.6–1.3)
EOSINOPHIL # BLD AUTO: 0.28 THOUSAND/ÂΜL (ref 0–0.61)
EOSINOPHIL NFR BLD AUTO: 3 % (ref 0–6)
ERYTHROCYTE [DISTWIDTH] IN BLOOD BY AUTOMATED COUNT: 16.2 % (ref 11.6–15.1)
GFR SERPL CREATININE-BSD FRML MDRD: 72 ML/MIN/1.73SQ M
GLUCOSE SERPL-MCNC: 123 MG/DL (ref 65–140)
GLUCOSE UR STRIP-MCNC: ABNORMAL MG/DL
HCT VFR BLD AUTO: 35.3 % (ref 34.8–46.1)
HGB BLD-MCNC: 10.7 G/DL (ref 11.5–15.4)
HGB UR QL STRIP.AUTO: NEGATIVE
IMM GRANULOCYTES # BLD AUTO: 0.03 THOUSAND/UL (ref 0–0.2)
IMM GRANULOCYTES NFR BLD AUTO: 0 % (ref 0–2)
INR PPP: 1.08 (ref 0.85–1.19)
KETONES UR STRIP-MCNC: NEGATIVE MG/DL
LEUKOCYTE ESTERASE UR QL STRIP: ABNORMAL
LYMPHOCYTES # BLD AUTO: 2.76 THOUSANDS/ÂΜL (ref 0.6–4.47)
LYMPHOCYTES NFR BLD AUTO: 30 % (ref 14–44)
MAGNESIUM SERPL-MCNC: 2 MG/DL (ref 1.9–2.7)
MCH RBC QN AUTO: 24.2 PG (ref 26.8–34.3)
MCHC RBC AUTO-ENTMCNC: 30.3 G/DL (ref 31.4–37.4)
MCV RBC AUTO: 80 FL (ref 82–98)
MONOCYTES # BLD AUTO: 0.83 THOUSAND/ÂΜL (ref 0.17–1.22)
MONOCYTES NFR BLD AUTO: 9 % (ref 4–12)
NEUTROPHILS # BLD AUTO: 5.22 THOUSANDS/ÂΜL (ref 1.85–7.62)
NEUTS SEG NFR BLD AUTO: 57 % (ref 43–75)
NITRITE UR QL STRIP: NEGATIVE
NON-SQ EPI CELLS URNS QL MICRO: NORMAL /HPF
NRBC BLD AUTO-RTO: 0 /100 WBCS
PH UR STRIP.AUTO: 6 [PH]
PLATELET # BLD AUTO: 336 THOUSANDS/UL (ref 149–390)
PMV BLD AUTO: 9.7 FL (ref 8.9–12.7)
POTASSIUM SERPL-SCNC: 3.6 MMOL/L (ref 3.5–5.3)
PROT SERPL-MCNC: 7.3 G/DL (ref 6.4–8.4)
PROT UR STRIP-MCNC: NEGATIVE MG/DL
PROTHROMBIN TIME: 14.4 SECONDS (ref 12.3–15)
RBC # BLD AUTO: 4.42 MILLION/UL (ref 3.81–5.12)
RBC #/AREA URNS AUTO: NORMAL /HPF
SODIUM SERPL-SCNC: 135 MMOL/L (ref 135–147)
SP GR UR STRIP.AUTO: 1.02 (ref 1–1.03)
UROBILINOGEN UR QL STRIP.AUTO: 0.2 E.U./DL
WBC # BLD AUTO: 9.18 THOUSAND/UL (ref 4.31–10.16)
WBC #/AREA URNS AUTO: NORMAL /HPF

## 2025-06-20 PROCEDURE — 85730 THROMBOPLASTIN TIME PARTIAL: CPT | Performed by: PHYSICIAN ASSISTANT

## 2025-06-20 PROCEDURE — 99285 EMERGENCY DEPT VISIT HI MDM: CPT | Performed by: PHYSICIAN ASSISTANT

## 2025-06-20 PROCEDURE — 85025 COMPLETE CBC W/AUTO DIFF WBC: CPT | Performed by: PHYSICIAN ASSISTANT

## 2025-06-20 PROCEDURE — 85610 PROTHROMBIN TIME: CPT | Performed by: PHYSICIAN ASSISTANT

## 2025-06-20 PROCEDURE — 83880 ASSAY OF NATRIURETIC PEPTIDE: CPT | Performed by: PHYSICIAN ASSISTANT

## 2025-06-20 PROCEDURE — 81001 URINALYSIS AUTO W/SCOPE: CPT | Performed by: PHYSICIAN ASSISTANT

## 2025-06-20 PROCEDURE — 71045 X-RAY EXAM CHEST 1 VIEW: CPT

## 2025-06-20 PROCEDURE — 36415 COLL VENOUS BLD VENIPUNCTURE: CPT | Performed by: PHYSICIAN ASSISTANT

## 2025-06-20 PROCEDURE — 99285 EMERGENCY DEPT VISIT HI MDM: CPT

## 2025-06-20 PROCEDURE — 93005 ELECTROCARDIOGRAM TRACING: CPT

## 2025-06-20 PROCEDURE — 80053 COMPREHEN METABOLIC PANEL: CPT | Performed by: PHYSICIAN ASSISTANT

## 2025-06-20 PROCEDURE — 84484 ASSAY OF TROPONIN QUANT: CPT | Performed by: PHYSICIAN ASSISTANT

## 2025-06-20 PROCEDURE — 83735 ASSAY OF MAGNESIUM: CPT | Performed by: PHYSICIAN ASSISTANT

## 2025-06-20 PROCEDURE — 96374 THER/PROPH/DIAG INJ IV PUSH: CPT

## 2025-06-20 RX ORDER — FUROSEMIDE 10 MG/ML
60 INJECTION INTRAMUSCULAR; INTRAVENOUS ONCE
Status: DISCONTINUED | OUTPATIENT
Start: 2025-06-20 | End: 2025-06-20

## 2025-06-20 RX ORDER — FUROSEMIDE 40 MG/1
40 TABLET ORAL AS NEEDED
Qty: 60 TABLET | Refills: 5 | Status: SHIPPED | OUTPATIENT
Start: 2025-06-20 | End: 2025-06-25

## 2025-06-20 RX ORDER — FUROSEMIDE 10 MG/ML
40 INJECTION INTRAMUSCULAR; INTRAVENOUS ONCE
Status: COMPLETED | OUTPATIENT
Start: 2025-06-20 | End: 2025-06-20

## 2025-06-20 RX ADMIN — FUROSEMIDE 40 MG: 10 INJECTION, SOLUTION INTRAMUSCULAR; INTRAVENOUS at 23:36

## 2025-06-20 NOTE — Clinical Note
Case was discussed with  and the patient's admission status was agreed to be Admission Status: inpatient status to the service of Dr. Lindo

## 2025-06-21 ENCOUNTER — HOSPITAL ENCOUNTER (INPATIENT)
Facility: HOSPITAL | Age: 65
LOS: 4 days | Discharge: HOME | End: 2025-06-25
Attending: INTERNAL MEDICINE
Payer: COMMERCIAL

## 2025-06-21 VITALS
HEIGHT: 68 IN | OXYGEN SATURATION: 94 % | BODY MASS INDEX: 33.31 KG/M2 | HEART RATE: 69 BPM | DIASTOLIC BLOOD PRESSURE: 57 MMHG | SYSTOLIC BLOOD PRESSURE: 94 MMHG | WEIGHT: 219.8 LBS | RESPIRATION RATE: 14 BRPM | TEMPERATURE: 97.2 F

## 2025-06-21 DIAGNOSIS — I50.23 ACUTE ON CHRONIC SYSTOLIC (CONGESTIVE) HEART FAILURE (HCC): Primary | ICD-10-CM

## 2025-06-21 PROBLEM — J06.9 VIRAL UPPER RESPIRATORY TRACT INFECTION: Status: ACTIVE | Noted: 2025-06-21

## 2025-06-21 LAB
2HR DELTA HS TROPONIN: -1 NG/L
4HR DELTA HS TROPONIN: -2 NG/L
CARDIAC TROPONIN I PNL SERPL HS: 4 NG/L (ref ?–50)
CARDIAC TROPONIN I PNL SERPL HS: 5 NG/L (ref ?–50)
FLUAV RNA RESP QL NAA+PROBE: NEGATIVE
FLUBV RNA RESP QL NAA+PROBE: NEGATIVE
GLUCOSE SERPL-MCNC: 117 MG/DL (ref 65–140)
GLUCOSE SERPL-MCNC: 118 MG/DL (ref 65–140)
GLUCOSE SERPL-MCNC: 118 MG/DL (ref 65–140)
MAGNESIUM SERPL-MCNC: 2.2 MG/DL (ref 1.9–2.7)
POTASSIUM SERPL-SCNC: 3.4 MMOL/L (ref 3.5–5.3)
RSV RNA RESP QL NAA+PROBE: NEGATIVE
SARS-COV-2 RNA RESP QL NAA+PROBE: NEGATIVE

## 2025-06-21 PROCEDURE — 96375 TX/PRO/DX INJ NEW DRUG ADDON: CPT

## 2025-06-21 PROCEDURE — 36415 COLL VENOUS BLD VENIPUNCTURE: CPT | Performed by: PHYSICIAN ASSISTANT

## 2025-06-21 PROCEDURE — 84484 ASSAY OF TROPONIN QUANT: CPT | Performed by: PHYSICIAN ASSISTANT

## 2025-06-21 PROCEDURE — 99223 1ST HOSP IP/OBS HIGH 75: CPT | Performed by: INTERNAL MEDICINE

## 2025-06-21 PROCEDURE — 82948 REAGENT STRIP/BLOOD GLUCOSE: CPT

## 2025-06-21 PROCEDURE — 84132 ASSAY OF SERUM POTASSIUM: CPT | Performed by: INTERNAL MEDICINE

## 2025-06-21 PROCEDURE — 0241U HB NFCT DS VIR RESP RNA 4 TRGT: CPT | Performed by: INTERNAL MEDICINE

## 2025-06-21 PROCEDURE — 94760 N-INVAS EAR/PLS OXIMETRY 1: CPT

## 2025-06-21 PROCEDURE — 94668 MNPJ CHEST WALL SBSQ: CPT

## 2025-06-21 PROCEDURE — 94669 MECHANICAL CHEST WALL OSCILL: CPT

## 2025-06-21 PROCEDURE — 83735 ASSAY OF MAGNESIUM: CPT | Performed by: INTERNAL MEDICINE

## 2025-06-21 RX ORDER — LEVOTHYROXINE SODIUM 75 UG/1
75 TABLET ORAL
Status: DISCONTINUED | OUTPATIENT
Start: 2025-06-22 | End: 2025-06-25 | Stop reason: HOSPADM

## 2025-06-21 RX ORDER — GUAIFENESIN/DEXTROMETHORPHAN 100-10MG/5
10 SYRUP ORAL EVERY 4 HOURS PRN
Status: DISCONTINUED | OUTPATIENT
Start: 2025-06-21 | End: 2025-06-25 | Stop reason: HOSPADM

## 2025-06-21 RX ORDER — FLUTICASONE PROPIONATE 50 MCG
1 SPRAY, SUSPENSION (ML) NASAL DAILY PRN
Status: DISCONTINUED | OUTPATIENT
Start: 2025-06-21 | End: 2025-06-21

## 2025-06-21 RX ORDER — PANTOPRAZOLE SODIUM 40 MG/10ML
40 INJECTION, POWDER, LYOPHILIZED, FOR SOLUTION INTRAVENOUS ONCE
Status: COMPLETED | OUTPATIENT
Start: 2025-06-21 | End: 2025-06-21

## 2025-06-21 RX ORDER — ALBUTEROL SULFATE 90 UG/1
2 INHALANT RESPIRATORY (INHALATION) EVERY 4 HOURS PRN
Status: DISCONTINUED | OUTPATIENT
Start: 2025-06-21 | End: 2025-06-25 | Stop reason: HOSPADM

## 2025-06-21 RX ORDER — CLOPIDOGREL BISULFATE 75 MG/1
75 TABLET ORAL ONCE
Status: COMPLETED | OUTPATIENT
Start: 2025-06-21 | End: 2025-06-21

## 2025-06-21 RX ORDER — LEVOTHYROXINE SODIUM 75 UG/1
75 TABLET ORAL ONCE
Status: COMPLETED | OUTPATIENT
Start: 2025-06-21 | End: 2025-06-21

## 2025-06-21 RX ORDER — DOFETILIDE 0.12 MG/1
125 CAPSULE ORAL EVERY 12 HOURS SCHEDULED
Status: DISCONTINUED | OUTPATIENT
Start: 2025-06-21 | End: 2025-06-25 | Stop reason: HOSPADM

## 2025-06-21 RX ORDER — POTASSIUM CHLORIDE 1500 MG/1
40 TABLET, EXTENDED RELEASE ORAL ONCE
Status: COMPLETED | OUTPATIENT
Start: 2025-06-21 | End: 2025-06-21

## 2025-06-21 RX ORDER — PANTOPRAZOLE SODIUM 40 MG/1
40 TABLET, DELAYED RELEASE ORAL
Status: DISCONTINUED | OUTPATIENT
Start: 2025-06-21 | End: 2025-06-25 | Stop reason: HOSPADM

## 2025-06-21 RX ORDER — ERGOCALCIFEROL 1.25 MG/1
50000 CAPSULE, LIQUID FILLED ORAL WEEKLY
Status: DISCONTINUED | OUTPATIENT
Start: 2025-06-23 | End: 2025-06-25 | Stop reason: HOSPADM

## 2025-06-21 RX ORDER — INSULIN LISPRO 100 [IU]/ML
1-5 INJECTION, SOLUTION INTRAVENOUS; SUBCUTANEOUS
Status: DISCONTINUED | OUTPATIENT
Start: 2025-06-21 | End: 2025-06-25 | Stop reason: HOSPADM

## 2025-06-21 RX ORDER — INSULIN LISPRO 100 [IU]/ML
1-5 INJECTION, SOLUTION INTRAVENOUS; SUBCUTANEOUS
Status: DISCONTINUED | OUTPATIENT
Start: 2025-06-22 | End: 2025-06-25 | Stop reason: HOSPADM

## 2025-06-21 RX ORDER — OXYCODONE HYDROCHLORIDE 5 MG/1
5 TABLET ORAL EVERY 6 HOURS PRN
Refills: 0 | Status: DISCONTINUED | OUTPATIENT
Start: 2025-06-21 | End: 2025-06-25 | Stop reason: HOSPADM

## 2025-06-21 RX ORDER — ACETAMINOPHEN 325 MG/1
650 TABLET ORAL EVERY 6 HOURS PRN
Status: DISCONTINUED | OUTPATIENT
Start: 2025-06-21 | End: 2025-06-25 | Stop reason: HOSPADM

## 2025-06-21 RX ORDER — SULFAMETHOXAZOLE AND TRIMETHOPRIM 800; 160 MG/1; MG/1
1 TABLET ORAL ONCE
Status: DISCONTINUED | OUTPATIENT
Start: 2025-06-21 | End: 2025-06-21 | Stop reason: HOSPADM

## 2025-06-21 RX ORDER — FLUTICASONE PROPIONATE 50 MCG
1 SPRAY, SUSPENSION (ML) NASAL ONCE
Status: DISCONTINUED | OUTPATIENT
Start: 2025-06-21 | End: 2025-06-21 | Stop reason: HOSPADM

## 2025-06-21 RX ORDER — DOFETILIDE 0.12 MG/1
125 CAPSULE ORAL EVERY 12 HOURS SCHEDULED
Status: DISCONTINUED | OUTPATIENT
Start: 2025-06-21 | End: 2025-06-21 | Stop reason: HOSPADM

## 2025-06-21 RX ORDER — ISOSORBIDE MONONITRATE 60 MG/1
60 TABLET, EXTENDED RELEASE ORAL DAILY
Status: DISCONTINUED | OUTPATIENT
Start: 2025-06-22 | End: 2025-06-25 | Stop reason: HOSPADM

## 2025-06-21 RX ORDER — PREGABALIN 75 MG/1
75 CAPSULE ORAL ONCE
Status: COMPLETED | OUTPATIENT
Start: 2025-06-21 | End: 2025-06-21

## 2025-06-21 RX ORDER — METOPROLOL SUCCINATE 50 MG/1
50 TABLET, EXTENDED RELEASE ORAL
Status: DISCONTINUED | OUTPATIENT
Start: 2025-06-21 | End: 2025-06-25 | Stop reason: HOSPADM

## 2025-06-21 RX ORDER — FLUTICASONE PROPIONATE 50 MCG
1 SPRAY, SUSPENSION (ML) NASAL DAILY
Status: DISCONTINUED | OUTPATIENT
Start: 2025-06-22 | End: 2025-06-25 | Stop reason: HOSPADM

## 2025-06-21 RX ORDER — CLOPIDOGREL BISULFATE 75 MG/1
75 TABLET ORAL DAILY
Status: DISCONTINUED | OUTPATIENT
Start: 2025-06-22 | End: 2025-06-25 | Stop reason: HOSPADM

## 2025-06-21 RX ORDER — ATORVASTATIN CALCIUM 80 MG/1
80 TABLET, FILM COATED ORAL EVERY EVENING
Status: DISCONTINUED | OUTPATIENT
Start: 2025-06-21 | End: 2025-06-25 | Stop reason: HOSPADM

## 2025-06-21 RX ORDER — FUROSEMIDE 10 MG/ML
40 INJECTION INTRAMUSCULAR; INTRAVENOUS
Status: DISCONTINUED | OUTPATIENT
Start: 2025-06-21 | End: 2025-06-22

## 2025-06-21 RX ORDER — FERROUS GLUCONATE 324(38)MG
324 TABLET ORAL EVERY OTHER DAY
Status: DISCONTINUED | OUTPATIENT
Start: 2025-06-21 | End: 2025-06-25 | Stop reason: HOSPADM

## 2025-06-21 RX ORDER — DOFETILIDE 0.12 MG/1
125 CAPSULE ORAL EVERY 12 HOURS SCHEDULED
Status: DISCONTINUED | OUTPATIENT
Start: 2025-06-21 | End: 2025-06-21

## 2025-06-21 RX ORDER — PREGABALIN 75 MG/1
75 CAPSULE ORAL 3 TIMES DAILY
Status: DISCONTINUED | OUTPATIENT
Start: 2025-06-21 | End: 2025-06-25 | Stop reason: HOSPADM

## 2025-06-21 RX ORDER — ISOSORBIDE MONONITRATE 60 MG/1
60 TABLET, EXTENDED RELEASE ORAL DAILY
Status: DISCONTINUED | OUTPATIENT
Start: 2025-06-22 | End: 2025-06-21

## 2025-06-21 RX ADMIN — DOFETILIDE 125 MCG: 0.12 CAPSULE ORAL at 18:12

## 2025-06-21 RX ADMIN — ACETAMINOPHEN 650 MG: 325 TABLET ORAL at 19:29

## 2025-06-21 RX ADMIN — DOFETILIDE 125 MCG: 0.12 CAPSULE ORAL at 07:24

## 2025-06-21 RX ADMIN — METOPROLOL SUCCINATE 50 MG: 50 TABLET, EXTENDED RELEASE ORAL at 21:26

## 2025-06-21 RX ADMIN — APIXABAN 5 MG: 5 TABLET, FILM COATED ORAL at 06:56

## 2025-06-21 RX ADMIN — FERROUS GLUCONATE 324 MG: 324 TABLET ORAL at 18:12

## 2025-06-21 RX ADMIN — PREGABALIN 75 MG: 75 CAPSULE ORAL at 16:35

## 2025-06-21 RX ADMIN — APIXABAN 5 MG: 5 TABLET, FILM COATED ORAL at 18:12

## 2025-06-21 RX ADMIN — SACUBITRIL AND VALSARTAN 1 TABLET: 49; 51 TABLET, FILM COATED ORAL at 10:52

## 2025-06-21 RX ADMIN — PREGABALIN 75 MG: 75 CAPSULE ORAL at 06:56

## 2025-06-21 RX ADMIN — FUROSEMIDE 40 MG: 10 INJECTION, SOLUTION INTRAMUSCULAR; INTRAVENOUS at 16:34

## 2025-06-21 RX ADMIN — ATORVASTATIN CALCIUM 80 MG: 80 TABLET, FILM COATED ORAL at 18:12

## 2025-06-21 RX ADMIN — PREGABALIN 75 MG: 75 CAPSULE ORAL at 21:26

## 2025-06-21 RX ADMIN — EMPAGLIFLOZIN 10 MG: 10 TABLET, FILM COATED ORAL at 07:24

## 2025-06-21 RX ADMIN — SACUBITRIL AND VALSARTAN 1 TABLET: 49; 51 TABLET, FILM COATED ORAL at 18:12

## 2025-06-21 RX ADMIN — POTASSIUM CHLORIDE 40 MEQ: 1500 TABLET, EXTENDED RELEASE ORAL at 21:26

## 2025-06-21 RX ADMIN — CLOPIDOGREL 75 MG: 75 TABLET ORAL at 06:58

## 2025-06-21 RX ADMIN — LEVOTHYROXINE SODIUM 75 MCG: 0.07 TABLET ORAL at 06:58

## 2025-06-21 RX ADMIN — PANTOPRAZOLE SODIUM 40 MG: 40 INJECTION, POWDER, FOR SOLUTION INTRAVENOUS at 07:00

## 2025-06-21 RX ADMIN — POTASSIUM CHLORIDE 40 MEQ: 1500 TABLET, EXTENDED RELEASE ORAL at 16:35

## 2025-06-21 RX ADMIN — PANTOPRAZOLE SODIUM 40 MG: 40 TABLET, DELAYED RELEASE ORAL at 16:34

## 2025-06-21 NOTE — ED NOTES
Pt given her morning dose of Tikosyn now at her request since she typically takes it at 0630     Alberta Gregory RN  06/21/25 9024

## 2025-06-21 NOTE — ASSESSMENT & PLAN NOTE
Rates are currently controlled  Continue dofetilide 125 mcg twice daily and Toprol XL 50 mg daily  Continue Eliquis 5 mg twice daily

## 2025-06-21 NOTE — ASSESSMENT & PLAN NOTE
Lab Results   Component Value Date    HGBA1C 5.9 (H) 03/28/2025       Recent Labs     06/21/25  1029   POCGLU 117       Blood Sugar Average: Last 72 hrs:  Well-controlled.  Monitor with sliding scale insulin while inpatient.   Home regimen is weekly Ozempic.   Maintained on Jardiance 10 mg daily as part of GDMT.  Hypoglycemia protocol

## 2025-06-21 NOTE — TELEPHONE ENCOUNTER
"Regarding: retaining water/SOB /  ----- Message from Mayi PERERA sent at 6/20/2025  8:24 PM EDT -----  \"I called in earlier but no one in the office called me back .I am retaining water in stomach and chest area with weight gains up to 4lbs and experiencing  shortness of breath. I took  80mg of lasix twice today and it only took off a pound and a half. I am not sure what to do\"    "

## 2025-06-21 NOTE — ED NOTES
Provider made aware of patient BP. Provider instructed this RN to monitor BP without intervention at this time.      Em Hart RN  06/21/25 0609

## 2025-06-21 NOTE — ED NOTES
This RN did ambulatory trial with patient. Patient satting at 94% while ambulating with the highest heart rate seen at 91. Provider made aware at this time.      Em Hart RN  06/21/25 8898

## 2025-06-21 NOTE — LETTER
Thank you for allowing us to participate in the care of your patient, Vesna Langston, who was hospitalized   6/21/2025 due to acute heart failure cardiology was on board.  Patient received IV Lasix for a few days and had marked improvement was complicated by hypotensive episodes and Entresto was decreased, isosorbide was decreased.  Today medically cleared for discharge advised follow-up with cardiology.  Patient was doing laps around the [place] and had marked improvement since admission.  Agrees with plan noted above.     Patient had complications of hypoattention during hospital stay.  Decrease the dose of Entresto  Patient is taking torsemide 20 mg once daily  Advise close follow-up with cardiology  Advise BMP repeat after 1 week  Medically cleared for discharge today with no needs no rehab needs    If you have any additional questions or would like to discuss further, please feel free to contact me.    Jaylon Fuentes MD  [facility] Internal Medicine, Hospitalist  [phone]

## 2025-06-21 NOTE — ASSESSMENT & PLAN NOTE
Patient reports cough, chest tightness and shortness of breath for multiple weeks.  She reports bringing up dark phlegm.  Symptoms could be related to her CHF exacerbation.  She received multiple antibiotic courses recently for sinusitis and URI  Check COVID/flu/RSV.  Supportive care  Multiple chest x-rays and CT chest on June were negative for pneumonia.

## 2025-06-21 NOTE — ASSESSMENT & PLAN NOTE
[facility] 3/2024 that s/p PCI to 100% ostial/proximal LAD stenosis  Continue Plavix, Eliquis, high intensity atorvastatin and beta-blocker  EKG with no acute ischemic changes.  Troponins negative

## 2025-06-21 NOTE — PLAN OF CARE
Problem: PAIN - ADULT  Goal: Verbalizes/displays adequate comfort level or baseline comfort level  Description: Interventions:  - Encourage patient to monitor pain and request assistance  - Assess pain using appropriate pain scale  - Administer analgesics as ordered based on type and severity of pain and evaluate response  - Implement non-pharmacological measures as appropriate and evaluate response  - Consider cultural and social influences on pain and pain management  - Notify physician/advanced practitioner if interventions unsuccessful or patient reports new pain  - Educate patient/family on pain management process including their role and importance of  reporting pain   - Provide non-pharmacologic/complimentary pain relief interventions  6/21/2025 1938 by Lissett Aldrich RN  Outcome: Progressing  6/21/2025 1938 by Lissett Aldrich RN  Outcome: Progressing     Problem: INFECTION - ADULT  Goal: Absence or prevention of progression during hospitalization  Description: INTERVENTIONS:  - Assess and monitor for signs and symptoms of infection  - Monitor lab/diagnostic results  - Monitor all insertion sites, i.e. indwelling lines, tubes, and drains  - Monitor endotracheal if appropriate and nasal secretions for changes in amount and color  - [place] appropriate cooling/warming therapies per order  - Administer medications as ordered  - Instruct and encourage patient and family to use good hand hygiene technique  - Identify and instruct in appropriate isolation precautions for identified infection/condition  6/21/2025 1938 by Lissett Aldrich RN  Outcome: Progressing  6/21/2025 1938 by Lissett Aldrich RN  Outcome: Progressing  Goal: Absence of fever/infection during neutropenic period  Description: INTERVENTIONS:  - Monitor WBC  - Perform strict hand hygiene  - Limit to healthy visitors only  - No plants, dried, fresh or silk flowers with castillo in patient room  6/21/2025 1938 by Lissett JIANG  LOUANN Aldrich  Outcome: Progressing  6/21/2025 1938 by Lissett Aldrich RN  Outcome: Progressing     Problem: SAFETY ADULT  Goal: Patient will remain free of falls  Description: INTERVENTIONS:  - Educate patient/family on patient safety including physical limitations  - Instruct patient to call for assistance with activity   - Consider consulting OT/PT to assist with strengthening/mobility based on AM PAC & JH-HLM score  - Consult OT/PT to assist with strengthening/mobility   - Keep Call bell within reach  - Keep bed low and locked with side rails adjusted as appropriate  - Keep care items and personal belongings within reach  - Initiate and maintain comfort rounds  - Make Fall Risk Sign visible to staff  - Apply yellow socks and bracelet for high fall risk patients  - Consider moving patient to room near nurses station  6/21/2025 1938 by Lissett Aldrich RN  Outcome: Progressing  6/21/2025 1938 by Lissett Aldrich RN  Outcome: Progressing  Goal: Maintain or return to baseline ADL function  Description: INTERVENTIONS:  -  Assess patient's ability to carry out ADLs; assess patient's baseline for ADL function and identify physical deficits which impact ability to perform ADLs (bathing, care of mouth/teeth, toileting, grooming, dressing, etc.)  - Assess/evaluate cause of self-care deficits   - Assess range of motion  - Assess patient's mobility; develop plan if impaired  - Assess patient's need for assistive devices and provide as appropriate  - Encourage maximum independence but intervene and supervise when necessary  - Involve family in performance of ADLs  - Assess for home care needs following discharge   - Consider OT consult to assist with ADL evaluation and planning for discharge  - Provide patient education as appropriate  - Monitor functional capacity and physical performance, use of AM PAC & JH-HLM   - Monitor gait, balance and fatigue with ambulation    6/21/2025 1938 by Lissett Aldrich,  RN  Outcome: Progressing  6/21/2025 1938 by Lissett Aldrich RN  Outcome: Progressing  Goal: Maintains/Returns to pre admission functional level  Description: INTERVENTIONS:  - Perform AM-PAC 6 Click Basic Mobility/ Daily Activity assessment daily.  - Set and communicate daily mobility goal to care team and patient/family/caregiver.   - Collaborate with rehabilitation services on mobility goals if consulted  - Out of bed for toileting  - Record patient progress and toleration of activity level   6/21/2025 1938 by Lissett Aldrich RN  Outcome: Progressing  6/21/2025 1938 by Lissett Aldrich RN  Outcome: Progressing     Problem: DISCHARGE PLANNING  Goal: Discharge to home or other facility with appropriate resources  Description: INTERVENTIONS:  - Identify barriers to discharge w/patient and caregiver  - Arrange for needed discharge resources and transportation as appropriate  - Identify discharge learning needs (meds, wound care, etc.)  - Arrange for interpretive services to assist at discharge as needed  - Refer to Case Management Department for coordinating discharge planning if the patient needs post-hospital services based on physician/advanced practitioner order or complex needs related to functional status, cognitive ability, or social support system  6/21/2025 1938 by Lissett Aldrich RN  Outcome: Progressing  6/21/2025 1938 by Lissett Aldrich RN  Outcome: Progressing     Problem: Knowledge Deficit  Goal: Patient/family/caregiver demonstrates understanding of disease process, treatment plan, medications, and discharge instructions  Description: Complete learning assessment and assess knowledge base.  Interventions:  - Provide teaching at level of understanding  - Provide teaching via preferred learning methods  6/21/2025 1938 by Lissett Aldrich RN  Outcome: Progressing  6/21/2025 1938 by iLssett Aldrich RN  Outcome: Progressing     Problem: CARDIOVASCULAR - ADULT  Goal:  Maintains optimal cardiac output and hemodynamic stability  Description: INTERVENTIONS:  - Monitor I/O, vital signs and rhythm  - Monitor for S/S and trends of decreased cardiac output  - Administer and titrate ordered vasoactive medications to optimize hemodynamic stability  - Assess quality of pulses, skin color and temperature  - Assess for signs of decreased coronary artery perfusion  - Instruct patient to report change in severity of symptoms  6/21/2025 1938 by Lissett Aldrich RN  Outcome: Progressing  6/21/2025 1938 by Lissett Aldrich RN  Outcome: Progressing  Goal: Absence of cardiac dysrhythmias or at baseline rhythm  Description: INTERVENTIONS:  - Continuous cardiac monitoring, vital signs, obtain 12 lead EKG if ordered  - Administer antiarrhythmic and heart rate control medications as ordered  - Monitor electrolytes and administer replacement therapy as ordered  6/21/2025 1938 by Lissett Aldrich RN  Outcome: Progressing  6/21/2025 1938 by Lissett Aldrich RN  Outcome: Progressing

## 2025-06-21 NOTE — ED NOTES
Discharge Note  Short Stay      SUMMARY     Admit Date: 12/27/2022    Attending Physician: Brielle José      Discharge Physician: Brielle José      Discharge Date: 12/27/2022 9:32 AM    Procedure(s) (LRB):  INJECTION,SACROILIAC JOINT, BILATERAL CONTRAST DIRECT REF  ORDERED (Bilateral)    Final Diagnosis: Spondylolisthesis at L5-S1 level [M43.17]  Sacroiliac dysfunction [M53.3]    Disposition: Home or self care    Patient Instructions:   Current Discharge Medication List        CONTINUE these medications which have NOT CHANGED    Details   tamsulosin (FLOMAX) 0.4 mg Cap Take 2 capsules (0.8 mg total) by mouth once daily.  Qty: 180 capsule, Refills: 0    Associated Diagnoses: Urinary frequency      atorvastatin (LIPITOR) 40 MG tablet Take 1 tablet (40 mg total) by mouth once daily.  Qty: 90 tablet, Refills: 3    Comments: Please discontinue refills for gemfibrozil      blood sugar diagnostic Strp To check BG 3 times daily, to use with insurance preferred meter  Qty: 100 each, Refills: 11    Associated Diagnoses: Diabetes mellitus without complication      blood-glucose meter kit To check BG 3 times daily, to use with insurance preferred meter  Qty: 1 each, Refills: 0    Associated Diagnoses: Newly diagnosed diabetes      lancets Misc To check BG 3 times daily, to use with insurance preferred meter  Qty: 100 each, Refills: 11    Associated Diagnoses: Newly diagnosed diabetes      metFORMIN (GLUCOPHAGE-XR) 500 MG ER 24hr tablet Take 2 tablets (1,000 mg total) by mouth 2 (two) times daily with meals.  Qty: 360 tablet, Refills: 3    Associated Diagnoses: Diabetes mellitus without complication      pep injection Bring the medication to appointment     For compounding pharmacy use:   Add PAPAVERINE 30 mcg  Add PHENTOLAMINE 10 mg  Add ALPROSTADIL 100 mcg  Qty: 1 vial, Refills: 3    Associated Diagnoses: Erectile dysfunction, unspecified erectile dysfunction type      tadalafiL (CIALIS) 20 MG Tab Take 1 tablet (20 mg  Pt continues to offer no complaints. Aware of 1300  time. Provided sandwich and drink.      Alberta Gregory RN  06/21/25 8401     total) by mouth daily as needed (erectile dysfunction).  Qty: 30 tablet, Refills: 11    Associated Diagnoses: Erectile dysfunction, unspecified erectile dysfunction type                 Discharge Diagnosis: Spondylolisthesis at L5-S1 level [M43.17]  Sacroiliac dysfunction [M53.3]  Condition on Discharge: Stable with no complications to procedure   Diet on Discharge: Same as before.  Activity: as per instruction sheet.  Discharge to: Home with a responsible adult.  Follow up: 2-4 weeks       Please call my office or pager at [phone] if experienced any weakness or loss of sensation, fever > 101.5, pain uncontrolled with oral medications, persistent nausea/vomiting/or diarrhea, redness or drainage from the incisions, or any other worrisome concerns. If physician on call was not reached or could not communicate with our office for any reason please go to the nearest emergency department

## 2025-06-21 NOTE — ED PROVIDER NOTES
"Time reflects when diagnosis was documented in both MDM as applicable and the Disposition within this note       Time User Action Codes Description Comment    6/20/2025 10:40 PM Ky Quinn Add [R06.02] SOB (shortness of breath)     6/20/2025 11:37 PM Ky Quinn Add [R07.9] Chest pain           ED Disposition       ED Disposition   Transfer to Another Facility-In Network    Condition   --    Date/Time   Fri Jun 20, 2025 11:36 PM    Comment   Case was discussed with Dr. Flynn recs transfer to [facility].               Assessment & Plan       Medical Decision Making  The patient is a 65-year-old female with a past medical history of CAD, CHF who presents from home for the concern of worsening dyspnea.  Patient states that she has been having worsening shortness of breath and chest pain over the last week.  She states that over the last week she been taking Lasix 80 mg twice daily.  Patient states that despite this she gained 4 pounds overnight.  Feels bloated.  Patient feels she is holding most of her fluid in her belly.  Patient was told by cardiology to \"go in for IV diuretics\".     Patient's blood pressure was in the low 100s systolically spoke with cardiology, recs give IV 40 mg of Lasix, would recommend transfer and admission to medicine, transferred for lack of cardiology coverage here on weekends at this facility.  Patient follows with [facility] cardiology    Differential Diagnosis included but was not limited to : Pneumonia, ACS, costochondritis, musculoskeletal pain, GERD, esophageal spasm,CHF , CAD, nstemi      Amount and/or Complexity of Data Reviewed  Labs: ordered. Decision-making details documented in ED Course.  Radiology: ordered and independent interpretation performed. Decision-making details documented in ED Course.  ECG/medicine tests: ordered and independent interpretation performed. Decision-making details documented in ED Course.    Risk  Prescription drug management.        ED Course as of " 06/20/25 2346   Fri Jun 20, 2025   2242 hs TnI 0hr: 6   2315 Hemoglobin(!): 10.7   2336 Spoke with Dr. Flynn with cardiology, reviewed case, the patient should be at facility where cardiology is available , unable to stay here due to lack of in house cardiology on the weekends. The patient is in agreement and follows with [facility]  Recs to transfer and admit to medicine at \[facility]\""    2337 Cards did rec 40 IV lasix and transfer         Medications   furosemide (LASIX) injection 40 mg (40 mg Intravenous Given 6/20/25 2336)       ED Risk Strat Scores                    No data recorded        SBIRT 20yo+      Flowsheet Row Most Recent Value   Initial Alcohol Screen: US AUDIT-C     1. How often do you have a drink containing alcohol? 0 Filed at: 06/20/2025 2221   2. How many drinks containing alcohol do you have on a typical day you are drinking?  0 Filed at: 06/20/2025 2221   3b. FEMALE Any Age, or MALE 65+: How often do you have 4 or more drinks on one occassion? 0 Filed at: 06/20/2025 2221   Audit-C Score 0 Filed at: 06/20/2025 2221   CHRIS: How many times in the past year have you...    Used an illegal drug or used a prescription medication for non-medical reasons? Never Filed at: 06/20/2025 2221                            History of Present Illness       Chief Complaint   Patient presents with    Shortness of Breath     Pt having SOB with exertion today. Pt feeling like she is retaining fluid and waking at night coughing. PMHX: CHF       Past Medical History[1]   Past Surgical History[2]   Family History[3]   Social History[4]   E-Cigarette/Vaping    E-Cigarette Use Never User       E-Cigarette/Vaping Substances    Nicotine No     THC No     CBD No     Flavoring No     Other No     Unknown No       I have reviewed and agree with the history as documented.     The patient is a 65-year-old female with a past medical history of CAD, CHF who presents from home for the concern of worsening dyspnea.  Patient states that she has  "been having worsening shortness of breath and chest pain over the last week.  She states that over the last week she been taking Lasix 80 mg twice daily.  Patient states that despite this she gained 4 pounds overnight.  Feels bloated.  Patient feels she is holding most of her fluid in her belly.  Patient was told by cardiology to \"go in for IV diuretics\".           Review of Systems   All other systems reviewed and are negative.          Objective       ED Triage Vitals   Temperature Pulse Blood Pressure Respirations SpO2 Patient Position - Orthostatic VS   06/20/25 2158 06/20/25 2158 06/20/25 2158 06/20/25 2158 06/20/25 2158 06/20/25 2158   (!) 97.2 °F (36.2 °C) 78 128/70 19 99 % Sitting      Temp Source Heart Rate Source BP Location FiO2 (%) Pain Score    06/20/25 2158 06/20/25 2230 06/20/25 2158 -- --    Temporal Monitor Left arm        Vitals      Date and Time Temp Pulse SpO2 Resp BP Pain Score FACES Pain Rating User   06/20/25 2300 -- 78 95 % 14 104/59 -- -- AD   06/20/25 2230 -- 74 94 % 14 94/54 -- -- AD   06/20/25 2158 97.2 °F (36.2 °C) 78 99 % 19 128/70 -- -- RG            Physical Exam  Vitals and nursing note reviewed.   Constitutional:       General: She is not in acute distress.     Appearance: She is well-developed.   HENT:      Head: Normocephalic and atraumatic.      Right Ear: External ear normal.      Left Ear: External ear normal.     Eyes:      Pupils: Pupils are equal, round, and reactive to light.       Cardiovascular:      Rate and Rhythm: Normal rate and regular rhythm.      Heart sounds: No murmur heard.  Pulmonary:      Effort: Pulmonary effort is normal. No respiratory distress.      Breath sounds: Rales present. No wheezing.   Abdominal:      General: Bowel sounds are normal. There is distension.      Palpations: Abdomen is soft. There is no mass.      Tenderness: There is no abdominal tenderness. There is no rebound.      Hernia: No hernia is present.     Musculoskeletal:      Cervical " back: Normal range of motion and neck supple.     Skin:     General: Skin is warm and dry.      Capillary Refill: Capillary refill takes less than 2 seconds.     Neurological:      Mental Status: She is alert and oriented to person, place, and time.      Coordination: Coordination normal.     Psychiatric:         Behavior: Behavior normal.         Results Reviewed       Procedure Component Value Units Date/Time    Urine Microscopic [[id number]]  (Normal) Collected: 06/20/25 2318    Lab Status: Final result Specimen: Urine, Other Updated: 06/20/25 2332     RBC, UA 1-2 /hpf      WBC, UA 2-4 /hpf      Epithelial Cells Occasional /hpf      Bacteria, UA Occasional /hpf     UA w Reflex to Microscopic w Reflex to Culture [[id number]]  (Abnormal) Collected: 06/20/25 2318    Lab Status: Final result Specimen: Urine, Other Updated: 06/20/25 2326     Color, UA Yellow     Clarity, UA Slightly Cloudy     Specific Gravity, UA 1.020     pH, UA 6.0     Leukocytes, UA Elevated glucose may cause decreased leukocyte values. See urine microscopic for UWBC result     Nitrite, UA Negative     Protein, UA Negative mg/dl      Glucose, UA >=1000 (1%) mg/dl      Ketones, UA Negative mg/dl      Urobilinogen, UA 0.2 E.U./dl      Bilirubin, UA Negative     Occult Blood, UA Negative    B-Type Natriuretic Peptide(BNP) [[id number]]  (Normal) Collected: 06/20/25 2208    Lab Status: Final result Specimen: Blood from Arm, Left Updated: 06/20/25 2248     BNP 45 pg/mL     HS Troponin 0hr (reflex protocol) [[id number]]  (Normal) Collected: 06/20/25 2208    Lab Status: Final result Specimen: Blood from Arm, Left Updated: 06/20/25 2240     hs TnI 0hr 6 ng/L     HS Troponin I 2hr [[id number]]     Lab Status: No result Specimen: Blood     Comprehensive metabolic panel [[id number]] Collected: 06/20/25 2208    Lab Status: Final result Specimen: Blood from Arm, Left Updated: 06/20/25 2233     Sodium 135 mmol/L      Potassium 3.6 mmol/L      Chloride 101 mmol/L       CO2 23 mmol/L      ANION GAP 11 mmol/L      BUN 17 mg/dL      Creatinine 0.84 mg/dL      Glucose 123 mg/dL      Calcium 9.5 mg/dL      AST 19 U/L      ALT 11 U/L      Alkaline Phosphatase 52 U/L      Total Protein 7.3 g/dL      Albumin 4.4 g/dL      Total Bilirubin 0.41 mg/dL      eGFR 72 ml/min/1.73sq m     Narrative:      National Kid[employer] Disease Foundation guidelines for Chronic Kidney Disease (CKD):     Stage 1 with normal or high GFR (GFR > 90 mL/min/1.73 square meters)    Stage 2 Mild CKD (GFR = 60-89 mL/min/1.73 square meters)    Stage 3A Moderate CKD (GFR = 45-59 mL/min/1.73 square meters)    Stage 3B Moderate CKD (GFR = 30-44 mL/min/1.73 square meters)    Stage 4 Severe CKD (GFR = 15-29 mL/min/1.73 square meters)    Stage 5 End Stage CKD (GFR <15 mL/min/1.73 square meters)  Note: GFR calculation is accurate only with a steady state creatinine    Magnesium [[id number]]  (Normal) Collected: 06/20/25 2208    Lab Status: Final result Specimen: Blood from Arm, Left Updated: 06/20/25 2233     Magnesium 2.0 mg/dL     Protime-INR [[id number]]  (Normal) Collected: 06/20/25 2208    Lab Status: Final result Specimen: Blood from Arm, Left Updated: 06/20/25 2229     Protime 14.4 seconds      INR 1.08    Narrative:      INR Therapeutic Range    Indication                                             INR Range      Atrial Fibrillation                                               2.0-3.0  Hypercoagulable State                                    2.0.2.3  Left Ventricular Asist Device                            2.0-3.0  Mechanical Heart Valve                                  -    Aortic(with afib, MI, embolism, HF, LA enlargement,    and/or coagulopathy)                                     2.0-3.0 (2.5-3.5)     Mitral                                                             2.5-3.5  Prosthetic/Bioprosthetic Heart Valve               2.0-3.0  Venous thromboembolism (VTE: VT, PE        2.0-3.0    APTT [[id number]]  (Normal)  Collected: 06/20/25 2208    Lab Status: Final result Specimen: Blood from Arm, Left Updated: 06/20/25 2229     PTT 30 seconds     CBC and differential [[id number]]  (Abnormal) Collected: 06/20/25 2208    Lab Status: Final result Specimen: Blood from Arm, Left Updated: 06/20/25 2221     WBC 9.18 Thousand/uL      RBC 4.42 Million/uL      Hemoglobin 10.7 g/dL      Hematocrit 35.3 %      MCV 80 fL      MCH 24.2 pg      MCHC 30.3 g/dL      RDW 16.2 %      MPV 9.7 fL      Platelets 336 Thousands/uL      nRBC 0 /100 WBCs      Segmented % 57 %      Immature Grans % 0 %      Lymphocytes % 30 %      Monocytes % 9 %      Eosinophils Relative 3 %      Basophils Relative 1 %      Absolute Neutrophils 5.22 Thousands/µL      Absolute Immature Grans 0.03 Thousand/uL      Absolute Lymphocytes 2.76 Thousands/µL      Absolute Monocytes 0.83 Thousand/µL      Eosinophils Absolute 0.28 Thousand/µL      Basophils Absolute 0.06 Thousands/µL             XR chest 1 view portable    (Results Pending)       ECG 12 Lead Documentation Only    Date/Time: 6/20/2025 10:07 PM    Performed by: Ky Quinn PA-C  Authorized by: Ky Quinn PA-C    Indications / Diagnosis:  Sob cp  ECG reviewed by me, the ED Provider: yes    Patient location:  ED  Previous ECG:     Previous ECG:  Unavailable  Interpretation:     Interpretation: abnormal    Rate:     ECG rate:  71    ECG rate assessment: normal    Rhythm:     Rhythm: sinus rhythm        ED Medication and Procedure Management   Prior to Admission Medications   Prescriptions Last Dose Informant Patient Reported? Taking?   Empagliflozin (Jardiance) 10 MG TABS tablet   No No   Sig: Take 1 tablet (10 mg total) by mouth every morning   Entresto 49-51 MG TABS   No No   Sig: Take 1 tablet by mouth 2 (two) times a day   HYDROcodone-acetaminophen (NORCO) 5-325 mg per tablet  Self No No   Sig: Take 1 tablet by mouth every 8 (eight) hours as needed for pain May cause drowsiness and dizziness. This  medication does contain tylenol/acetaminophen, so will need to limit any supplemental tylenol intake. Do not exceed 3000 mg (3g) of tylenol/acetaminophen every 24 hours. Max Daily Amount: 3 tablets   Multiple Vitamin (multivitamin) tablet  Self Yes No   Sig: Take 1 tablet by mouth in the morning.   acetaminophen (TYLENOL) 325 mg tablet  Self No No   Sig: Take 2 tablets (650 mg total) by mouth every 6 (six) hours as needed for mild pain   albuterol (ProAir HFA) 90 mcg/act inhaler   No No   Sig: Inhale 2 puffs every 6 (six) hours as needed for wheezing   apixaban (ELIQUIS) 5 mg  Self No No   Sig: Take 1 tablet (5 mg total) by mouth 2 (two) times a day   atorvastatin (LIPITOR) 80 mg tablet  Self No No   Sig: TAKE 1 TABLET BY MOUTH EVERY DAY IN THE EVENING   clopidogrel (PLAVIX) 75 mg tablet  Self No No   Sig: Take 1 tablet (75 mg total) by mouth daily   diphenhydrAMINE (BENADRYL) 25 mg capsule  Self Yes No   Sig: Take 25 mg by mouth every 6 (six) hours as needed for itching   dofetilide (TIKOSYN) 125 mcg capsule  Self No No   Sig: Take 1 capsule (125 mcg total) by mouth every 12 (twelve) hours   ergocalciferol (VITAMIN D2) 50,000 units   No No   Sig: Take 1 capsule (50,000 Units total) by mouth once a week   ferrous gluconate (FERGON) 324 mg tablet   No No   Sig: Take 1 tablet (324 mg total) by mouth every other day   fluticasone (FLONASE) 50 mcg/act nasal spray  Self No No   Si spray into each nostril daily   furosemide (LASIX) 40 mg tablet   No No   Sig: Take 1 tablet (40 mg total) by mouth if needed (if worse shortness of breath or weight up 3lbs)   isosorbide mononitrate (IMDUR) 30 mg 24 hr tablet   No No   Sig: Take 3 tablets (90 mg total) by mouth daily   levothyroxine (Synthroid) 75 mcg tablet   No No   Sig: Take 1 tablet (75 mcg total) by mouth daily   meclizine (ANTIVERT) 25 mg tablet  Self No No   Sig: Take 1 tablet (25 mg total) by mouth every 8 (eight) hours as needed for dizziness   metoprolol  succinate (TOPROL-XL) 25 mg 24 hr tablet   No No   Sig: Take 2 tablets (50 mg total) by mouth daily at bedtime   oxyCODONE (ROXICODONE) 5 immediate release tablet  Self Yes No   Sig: Take 5 mg by mouth every 6 (six) hours as needed for moderate pain Patient is taking as needed   pantoprazole (PROTONIX) 40 mg tablet  Self No No   Sig: TAKE 1 TABLET BY MOUTH 2 TIMES A DAY BEFORE MEALS.   potassium chloride (Klor-Con M20) 20 mEq tablet  Self No No   Sig: Take 1 tablet (20 mEq total) by mouth if needed (on lasix days)   pregabalin (LYRICA) 75 mg capsule   No No   Sig: Take 1 capsule (75 mg total) by mouth 3 (three) times a day   semaglutide, 0.25 or 0.5 mg/dose, (Ozempic, 0.25 or 0.5 MG/DOSE,) 2 mg/3 mL injection pen  Self No No   Sig: Inject 0.75 mL (0.5 mg total) under the skin every 7 days 0.25 mg under the skin every 7 days for 4 doses (28 days), THEN 0.5 mg under the skin every 7 days   sulfamethoxazole-trimethoprim (BACTRIM DS) 800-160 mg per tablet   No No   Sig: Take 1 tablet by mouth every 12 (twelve) hours for 14 days      Facility-Administered Medications: None     Patient's Medications   Discharge Prescriptions    No medications on file     No discharge procedures on file.  ED SEPSIS DOCUMENTATION   Time reflects when diagnosis was documented in both MDM as applicable and the Disposition within this note       Time User Action Codes Description Comment    6/20/2025 10:40 PM Ky Quinn Add [R06.02] SOB (shortness of breath)     6/20/2025 11:37 PM Ky Quinn Add [R07.9] Chest pain                      [1]   Past Medical History:  Diagnosis Date    Acute respiratory insufficiency 03/22/2024    Allergic     Anxiety 4/24    Arrhythmia 3/22/24    Atrial fibrillation (HCC) 3/22/24    Back pain 2022    Bradycardia 3/22/24    BRCA1 negative     BRCA2 negative     Breast cyst 2000    Chronic HFrEF (heart failure with reduced ejection fraction) (HCC)     Clotting disorder (HCC) 4/1/24    Coronary artery disease      COVID-19 virus infection 11/28/2022    Depression 4/24    Diabetes mellitus (HCC)     Disease of thyroid gland 4/09/2024    Environmental and seasonal allergies 1/1/1960    GERD (gastroesophageal reflux disease) 10/09    GI (gastrointestinal bleed) 4/1/24    Heart disease 3/24    History of placement of internal cardiac defibrillator 3/27/24    Hyperlipidemia     Hypoglycemia     ICD (implantable cardioverter-defibrillator) in place     Ischemic cardiomyopathy     Lactic acidosis 03/22/2024    Migraine 1980    Morning headache 1980    Myocardial infarction (HCC) 3/22/2024    Nausea & vomiting 04/03/2025    Otitis media 1966    Pacemaker 3/27/24    Peptic ulceration 4/2024    Seasonal allergies     Sinus problem 1970    ST elevation myocardial infarction involving left anterior descending (LAD) coronary artery ([facility]) 03/22/2024    Tobacco abuse     Visual impairment 1967   [2]   Past Surgical History:  Procedure Laterality Date    ADENOIDECTOMY      APPENDECTOMY      APPENDECTOMY LAPAROSCOPIC N/A 05/08/2024    Procedure: APPENDECTOMY LAPAROSCOPIC;  Surgeon: Lauryn Ullrich, DO;  Location: [facility];  Service: General    BACK SURGERY  8/23    BACK SURGERY      BREAST EXCISIONAL BIOPSY Right 09/2000    cyst removed- benign    CARDIAC CATHETERIZATION N/A 03/22/2024    Procedure: Cardiac pci;  Surgeon: Gabriel Myers MD;  Location: [facility];  Service: Cardiology    CARDIAC CATHETERIZATION N/A 03/22/2024    Procedure: Cardiac Coronary Angiogram;  Surgeon: Gabriel Myers MD;  [facility];  Service: Cardiology    CARDIAC CATHETERIZATION N/A 03/22/2024    Procedure: Cardiac PCI AMI;  Surgeon: Gabriel Myers MD;  [facility];  Service: Cardiology    CARDIAC CATHETERIZATION N/A 03/22/2024    Procedure: Cardiac IVUS;  Surgeon: Gabriel Myers MD;  [facility];  Service: Cardiology    CARDIAC DEFIBRILLATOR PLACEMENT      CARDIAC ELECTROPHYSIOLOGY PROCEDURE N/A 03/27/2024     Procedure: Cardiac icd implant;  Surgeon: Jeffy Lama MD;  [facility];  Service: Cardiology     SECTION      COLONOSCOPY      ECTOPIC PREGNANCY SURGERY      INSERT / REPLACE / REMOVE PACEMAKER      LUMBAR FUSION Bilateral 2025    Procedure: Navigated revision L3-S1 laminectomy/decompression, revision L3-S1 posterior instrumented spinal fusion with TLIF;  Surgeon: Charles Mcrae MD;  Location: [facility];  Service: Orthopedics    MASS EXCISION Left 10/18/2024    Procedure: EXCISION BIOPSY TISSUE LESION/MASS UPPER EXTREMITY - Left index finger;  Surgeon: Leandro Campos MD;  Location:  MAIN OR;  Service: Orthopedics    SEPTOPLASTY      SPINE SURGERY  23    TONSILLECTOMY      TONSILLECTOMY AND ADENOIDECTOMY  1964    TRIGGER POINT INJECTION      UPPER GASTROINTESTINAL ENDOSCOPY  24   [3]   Family History  Adopted: Yes   Problem Relation Name Age of Onset    Depression Mother Unknown     Sleep apnea Mother Unknown     Heart disease Father Unknown     Snoring Father Unknown     Restless legs syndrome Father Unknown     OCD Daughter     [4]   Social History  Tobacco Use    Smoking status: Former     Current packs/day: 0.00     Average packs/day: 1 pack/day for 24.2 years (24.2 ttl pk-yrs)     Types: Cigarettes     Start date: 2000     Quit date: 3/22/2024     Years since quittin.2     Passive exposure: Never    Smokeless tobacco: Never    Tobacco comments:     Smoked 0.5-1 ppd; quit in 2024.    Vaping Use    Vaping status: Never Used   Substance Use Topics    Alcohol use: Not Currently     Alcohol/week: 1.0 standard drink of alcohol     Types: 1 Shots of liquor per week     Comment: Every 2or 3months    Drug use: Never        Ky Quinn PA-C  [phone]

## 2025-06-21 NOTE — ASSESSMENT & PLAN NOTE
Iron studies consistent with BIANKA  Hemoglobin stable around mid 9-10s since May 2025  Continue iron supplements  Monitor CBC

## 2025-06-21 NOTE — ASSESSMENT & PLAN NOTE
Wt Readings from Last 3 Encounters:   06/20/25 99.7 kg (219 lb 12.8 oz)   06/16/25 96.6 kg (212 lb 15.4 oz)   06/14/25 96.6 kg (213 lb)   Patient presented with complaints of worsening shortness of breath and chest tightness with associated weight gain despite taking Lasix 80 mg bid.   Patient transferred to [facility] for heart failure evaluation  Most recent echocardiogram 10/2024 showed EF of 40-45% improved from prior of 30%  Home regimen diuretic is Lasix 40 mg daily as needed however has been using Lasix 80 mg twice daily for the last few days  Home regimen GDMT is Toprol-XL 50 mg daily, Entresto 49/51 mg twice daily, Jardiance 10 mg daily and Isordil 60 mg daily which will be continued as BP allows  Patient received IV Lasix 40 mg x1 overnight with good urine output per patient.  Will place on IV Lasix 40 mg twice daily and titrate as BP allows.  Monitor I/O's, daily weights and monitor for electrolyte disturbances and nephrotoxicity while on diuretics.  Heart failure consultation obtained  Monitor on telemetry.  Obtain updated echocardiogram.

## 2025-06-21 NOTE — ED NOTES
Ambulated to restroom with steady gait, offers no complaints currently. Updated on transfer status, aware that we are still awaiting a bed to become available.     Alberta Gregory RN  06/21/25 [id number]

## 2025-06-21 NOTE — ED NOTES
Pt is sleeping, noted to self reposition. No signs of discomfort or distress noted. Call light within reach.      Alberta Gregory RN  06/21/25 7710

## 2025-06-21 NOTE — H&P
H&P - Hospitalist   Name: Vesna Langston 65 y.o. female I MRN: [id number]  [facility] I Date of Admission: (Not on file)   Date of Service: 6/21/2025 I Hospital Day: 0     Assessment & Plan  Acute on chronic systolic (congestive) heart failure (HCC)  Wt Readings from Last 3 Encounters:   06/20/25 99.7 kg (219 lb 12.8 oz)   06/16/25 96.6 kg (212 lb 15.4 oz)   06/14/25 96.6 kg (213 lb)   Patient presented with complaints of worsening shortness of breath and chest tightness with associated weight gain despite taking Lasix 80 mg bid.   Patient transferred to [facility] for heart failure evaluation  Most recent echocardiogram 10/2024 showed EF of 40-45% improved from prior of 30%  Home regimen diuretic is Lasix 40 mg daily as needed however has been using Lasix 80 mg twice daily for the last few days  Home regimen GDMT is Toprol-XL 50 mg daily, Entresto 49/51 mg twice daily, Jardiance 10 mg daily and Isordil 60 mg daily which will be continued as BP allows  Patient received IV Lasix 40 mg x1 overnight with good urine output per patient.  Will place on IV Lasix 40 mg twice daily and titrate as BP allows.  Monitor I/O's, daily weights and monitor for electrolyte disturbances and nephrotoxicity while on diuretics.  Heart failure consultation obtained  Monitor on telemetry.  Obtain updated echocardiogram.    CAD (coronary artery disease)  [facility] 3/2024 that s/p PCI to 100% ostial/proximal LAD stenosis  Continue Plavix, Eliquis, high intensity atorvastatin and beta-blocker  EKG with no acute ischemic changes.  Troponins negative  Atrial flutter, paroxysmal (HCC)  Rates are currently controlled  Continue dofetilide 125 mcg twice daily and Toprol XL 50 mg daily  Continue Eliquis 5 mg twice daily  URI (upper respiratory infection)  Patient reports cough, chest tightness and shortness of breath for multiple weeks.  She reports bringing up dark phlegm.  Symptoms could be related to her CHF exacerbation.  She received multiple  antibiotic courses recently for sinusitis and URI  Check COVID/flu/RSV.  Supportive care  Multiple chest x-rays and CT chest on June were negative for pneumonia.  Acquired hypothyroidism  Continue levothyroxine 75 mcg daily  Anemia  Iron studies consistent with BIANKA  Hemoglobin stable around mid 9-10s since May 2025  Continue iron supplements  Monitor CBC  History of sustained ventricular tachycardia  Felt to be scar mediated per reviewing EP notes.  S/p ICD.  Ischemic cardiomyopathy  s/p PCI to 100% ostial/proximal LAD stenosis 3/2024  GDMT as above.  Type 2 diabetes mellitus, without long-term current use of insulin ([facility])  Lab Results   Component Value Date    HGBA1C 5.9 (H) 03/28/2025       Recent Labs     06/21/25  1029   POCGLU 117       Blood Sugar Average: Last 72 hrs:  Well-controlled.  Monitor with sliding scale insulin while inpatient.   Home regimen is weekly Ozempic.   Maintained on Jardiance 10 mg daily as part of GDMT.  Hypoglycemia protocol        VTE Pharmacologic Prophylaxis:   Moderate Risk (Score 3-4) - Pharmacological DVT Prophylaxis Ordered: apixaban (Eliquis).  Code Status: Prior       Anticipated Length of Stay: Patient will be admitted on an inpatient basis with an anticipated length of stay of greater than 2 midnights secondary to CHF.    History of Present Illness   Chief Complaint: Shortness of breath    Vesna Langston is a 65 y.o. female with a PMH of CAD,ICM, heart failure with ejection fraction, VT s/p ICD, Atrial flutter on Eliquis, hyperlipidemia, type 2 diabetes mellitus, chronic back pain and hypothyroidism who presented initially to [facility] with worsening shortness of breath and chest tightness with concerns for CHF exacerbation.  Case discussed with cardiology who recommended [facility] failure evaluation.  Patient seen and examined, she reports her symptoms improved after getting a dose of IV Lasix at [facility].  She did have multiple episodes of hypotension prior to  transfer however BP on arrival is stable.     Review of Systems   Constitutional:  Negative for fever.   Respiratory:  Positive for cough, chest tightness and shortness of breath.    Cardiovascular:  Negative for chest pain.   Gastrointestinal:  Negative for abdominal pain.   Genitourinary:  Negative for difficulty urinating and dysuria.   Musculoskeletal:  Positive for back pain (chronic).       Historical Information   Past Medical History[1]  Past Surgical History[2]  Social History[3]  E-Cigarette/Vaping    E-Cigarette Use Never User      E-Cigarette/Vaping Substances    Nicotine No     THC No     CBD No     Flavoring No     Other No     Unknown No      Family history non-contributory  Social History:  Marital Status:      Meds/Allergies   I have reviewed home medications with patient personally.  Prior to Admission medications    Medication Sig Start Date End Date Taking? Authorizing Provider   acetaminophen (TYLENOL) 325 mg tablet Take 2 tablets (650 mg total) by mouth every 6 (six) hours as needed for mild pain 5/14/25   Hebert Aldrich MD   albuterol (ProAir HFA) 90 mcg/act inhaler Inhale 2 puffs every 6 (six) hours as needed for wheezing 6/16/25   Melissa Montoya, DO   apixaban (ELIQUIS) 5 mg Take 1 tablet (5 mg total) by mouth 2 (two) times a day 2/6/25   Melissa Montoya, DO   atorvastatin (LIPITOR) 80 mg tablet TAKE 1 TABLET BY MOUTH EVERY DAY IN THE EVENING 1/15/25   Melissa Montoya, DO   clopidogrel (PLAVIX) 75 mg tablet Take 1 tablet (75 mg total) by mouth daily 12/24/24   Melissa Montoya, DO   diphenhydrAMINE (BENADRYL) 25 mg capsule Take 25 mg by mouth every 6 (six) hours as needed for itching    Historical Provider, MD   dofetilide (TIKOSYN) 125 mcg capsule Take 1 capsule (125 mcg total) by mouth every 12 (twelve) hours 2/13/25   Lance Ye PA-C   Empagliflozin (Jardiance) 10 MG TABS tablet Take 1 tablet (10 mg total) by mouth every morning 6/10/25 12/2/26  Vitor Bell MD    Entresto 49-51 MG TABS Take 1 tablet by mouth 2 (two) times a day 6/10/25 12/2/26  Vitor Bell MD   ergocalciferol (VITAMIN D2) 50,000 units Take 1 capsule (50,000 Units total) by mouth once a week 6/17/25   Melissa Montoya DO   ferrous gluconate (FERGON) 324 mg tablet Take 1 tablet (324 mg total) by mouth every other day 6/16/25   Melissa Montoya DO   fluticasone (FLONASE) 50 mcg/act nasal spray 1 spray into each nostril daily 11/20/24   MAHI Price   furosemide (LASIX) 40 mg tablet Take 1 tablet (40 mg total) by mouth if needed (if worse shortness of breath or weight up 3lbs) 6/20/25 12/12/26  Vitor Bell MD   HYDROcodone-acetaminophen (NORCO) 5-325 mg per tablet Take 1 tablet by mouth every 8 (eight) hours as needed for pain May cause drowsiness and dizziness. This medication does contain tylenol/acetaminophen, so will need to limit any supplemental tylenol intake. Do not exceed 3000 mg (3g) of tylenol/acetaminophen every 24 hours. Max Daily Amount: 3 tablets 6/10/25   Monserrat Beltran PA-C   isosorbide mononitrate (IMDUR) 30 mg 24 hr tablet Take 3 tablets (90 mg total) by mouth daily 6/11/25 12/3/26  Vitor Bell MD   levothyroxine (Synthroid) 75 mcg tablet Take 1 tablet (75 mcg total) by mouth daily 6/16/25   Melissa Montoya DO   meclizine (ANTIVERT) 25 mg tablet Take 1 tablet (25 mg total) by mouth every 8 (eight) hours as needed for dizziness 4/5/25   MAHI Gonsales   metoprolol succinate (TOPROL-XL) 25 mg 24 hr tablet Take 2 tablets (50 mg total) by mouth daily at bedtime 6/10/25 12/2/26  Vitor Bell MD   Multiple Vitamin (multivitamin) tablet Take 1 tablet by mouth in the morning.    Historical MD Nicolette   oxyCODONE (ROXICODONE) 5 immediate release tablet Take 5 mg by mouth every 6 (six) hours as needed for moderate pain Patient is taking as needed    Historical Provider, MD   pantoprazole (PROTONIX) 40 mg tablet TAKE 1 TABLET BY MOUTH 2 TIMES A DAY BEFORE MEALS. 5/18/25    Melissa Montoya,    potassium chloride (Klor-Con M20) 20 mEq tablet Take 1 tablet (20 mEq total) by mouth if needed (on lasix days) 2/14/25 8/8/26  Vitor Bell MD   pregabalin (LYRICA) 75 mg capsule Take 1 capsule (75 mg total) by mouth 3 (three) times a day 6/16/25   Melissa Montoya DO   semaglutide, 0.25 or 0.5 mg/dose, (Ozempic, 0.25 or 0.5 MG/DOSE,) 2 mg/3 mL injection pen Inject 0.75 mL (0.5 mg total) under the skin every 7 days 0.25 mg under the skin every 7 days for 4 doses (28 days), THEN 0.5 mg under the skin every 7 days 5/23/25   Melissa Montoya DO   sulfamethoxazole-trimethoprim (BACTRIM DS) 800-160 mg per tablet Take 1 tablet by mouth every 12 (twelve) hours for 14 days  Patient not taking: Reported on 6/21/2025 6/14/25 6/28/25  MAHI Nelson     Allergies   Allergen Reactions    Fish-Derived Products - Food Allergy Anaphylaxis     Cannot have all seafood products    Shellfish-Derived Products - Food Allergy Anaphylaxis    Azithromycin Hives and Rash       Objective :  Temp:  [97.2 °F (36.2 °C)] 97.2 °F (36.2 °C)  HR:  [69-81] 69  BP: ()/(42-70) 92/51  Resp:  [13-25] 14  SpO2:  [93 %-99 %] 95 %  O2 Device: None (Room air)    Physical Exam  Constitutional:       General: She is not in acute distress.     Appearance: She is not ill-appearing, toxic-appearing or diaphoretic.     Cardiovascular:      Rate and Rhythm: Normal rate and regular rhythm.   Pulmonary:      Breath sounds: Rhonchi (Rhonchi heard when patient was coughing during auscultation.) present. No wheezing.      Comments: Diminished breath sounds bilaterally  Abdominal:      Tenderness: There is no abdominal tenderness. There is no guarding.     Musculoskeletal:      Right lower leg: Edema present.      Left lower leg: Edema present.     Neurological:      Mental Status: She is alert.     Psychiatric:         Mood and Affect: Mood normal.         Behavior: Behavior normal.          Lines/Drains:            Lab Results:  I have reviewed the following results:  Results from last 7 days   Lab Units 06/20/25  2208   WBC Thousand/uL 9.18   HEMOGLOBIN g/dL 10.7*   HEMATOCRIT % 35.3   PLATELETS Thousands/uL 336   SEGS PCT % 57   LYMPHO PCT % 30   MONO PCT % 9   EOS PCT % 3     Results from last 7 days   Lab Units 06/20/25  2208   SODIUM mmol/L 135   POTASSIUM mmol/L 3.6   CHLORIDE mmol/L 101   CO2 mmol/L 23   BUN mg/dL 17   CREATININE mg/dL 0.84   ANION GAP mmol/L 11   CALCIUM mg/dL 9.5   ALBUMIN g/dL 4.4   TOTAL BILIRUBIN mg/dL 0.41   ALK PHOS U/L 52   ALT U/L 11   AST U/L 19   GLUCOSE RANDOM mg/dL 123     Results from last 7 days   Lab Units 06/20/25  2208   INR  1.08     Results from last 7 days   Lab Units 06/21/25  1029   POC GLUCOSE mg/dl 117     Lab Results   Component Value Date    HGBA1C 5.9 (H) 03/28/2025    HGBA1C 7.1 (H) 12/27/2024    HGBA1C 7.2 (H) 09/27/2024           Imaging Results Review: I reviewed radiology reports from this admission including: chest xray.  Other Study Results Review: EKG was reviewed.     Administrative Statements   I have spent a total time of 76 minutes in caring for this patient on the day of the visit/encounter including Diagnostic results, Counseling / Coordination of care, Documenting in the medical record, Reviewing/placing orders in the medical record (including tests, medications, and/or procedures), Obtaining or reviewing history  , and Communicating with other healthcare professionals .    ** Please Note: This note has been constructed using a voice recognition system. **         [1]   Past Medical History:  Diagnosis Date    Acute respiratory insufficiency 03/22/2024    Allergic     Anxiety 4/24    Arrhythmia 3/22/24    Atrial fibrillation (HCC) 3/22/24    Back pain 2022    Bradycardia 3/22/24    BRCA1 negative     BRCA2 negative     Breast cyst 2000    Chronic HFrEF (heart failure with reduced ejection fraction) (HCC)     Clotting disorder (HCC) 4/1/24    Coronary artery disease      COVID-19 virus infection 11/28/2022    Depression 4/24    Diabetes mellitus (HCC)     Disease of thyroid gland 4/09/2024    Environmental and seasonal allergies 1/1/1960    GERD (gastroesophageal reflux disease) 10/09    GI (gastrointestinal bleed) 4/1/24    Heart disease 3/24    History of placement of internal cardiac defibrillator 3/27/24    Hyperlipidemia     Hypoglycemia     ICD (implantable cardioverter-defibrillator) in place     Ischemic cardiomyopathy     Lactic acidosis 03/22/2024    Migraine 1980    Morning headache 1980    Myocardial infarction (HCC) 3/22/2024    Nausea & vomiting 04/03/2025    Otitis media 1966    Pacemaker 3/27/24    Peptic ulceration 4/2024    Seasonal allergies     Sinus problem 1970    ST elevation myocardial infarction involving left anterior descending (LAD) coronary artery ([facility]) 03/22/2024    Tobacco abuse     Visual impairment 1967   [2]   Past Surgical History:  Procedure Laterality Date    ADENOIDECTOMY      APPENDECTOMY      APPENDECTOMY LAPAROSCOPIC N/A 05/08/2024    Procedure: APPENDECTOMY LAPAROSCOPIC;  Surgeon: Lauryn Ullrich, DO;  Location: [facility];  Service: General    BACK SURGERY  8/23    BACK SURGERY      BREAST EXCISIONAL BIOPSY Right 09/2000    cyst removed- benign    CARDIAC CATHETERIZATION N/A 03/22/2024    Procedure: Cardiac pci;  Surgeon: Gabriel Myers MD;  Location: [facility];  Service: Cardiology    CARDIAC CATHETERIZATION N/A 03/22/2024    Procedure: Cardiac Coronary Angiogram;  Surgeon: Gabriel Myers MD;  [facility];  Service: Cardiology    CARDIAC CATHETERIZATION N/A 03/22/2024    Procedure: Cardiac PCI AMI;  Surgeon: Gabriel Myers MD;  [facility];  Service: Cardiology    CARDIAC CATHETERIZATION N/A 03/22/2024    Procedure: Cardiac IVUS;  Surgeon: Gabriel Myers MD;  [facility];  Service: Cardiology    CARDIAC DEFIBRILLATOR PLACEMENT      CARDIAC ELECTROPHYSIOLOGY PROCEDURE N/A 03/27/2024     Procedure: Cardiac icd implant;  Surgeon: Jeffy Lama MD;  [facility];  Service: Cardiology     SECTION      COLONOSCOPY      ECTOPIC PREGNANCY SURGERY      INSERT / REPLACE / REMOVE PACEMAKER      LUMBAR FUSION Bilateral 2025    Procedure: Navigated revision L3-S1 laminectomy/decompression, revision L3-S1 posterior instrumented spinal fusion with TLIF;  Surgeon: Charles Mcrae MD;  Location: [facility];  Service: Orthopedics    MASS EXCISION Left 10/18/2024    Procedure: EXCISION BIOPSY TISSUE LESION/MASS UPPER EXTREMITY - Left index finger;  Surgeon: Leandro Campos MD;  Location:  MAIN OR;  Service: Orthopedics    SEPTOPLASTY      SPINE SURGERY  23    TONSILLECTOMY      TONSILLECTOMY AND ADENOIDECTOMY  1964    TRIGGER POINT INJECTION      UPPER GASTROINTESTINAL ENDOSCOPY  24   [3]   Social History  Tobacco Use    Smoking status: Former     Current packs/day: 0.00     Average packs/day: 1 pack/day for 24.2 years (24.2 ttl pk-yrs)     Types: Cigarettes     Start date: 2000     Quit date: 3/22/2024     Years since quittin.2     Passive exposure: Never    Smokeless tobacco: Never    Tobacco comments:     Smoked 0.5-1 ppd; quit in 2024.    Vaping Use    Vaping status: Never Used   Substance and Sexual Activity    Alcohol use: Not Currently     Alcohol/week: 1.0 standard drink of alcohol     Types: 1 Shots of liquor per week     Comment: Every 2or 3months    Drug use: Never    Sexual activity: Not Currently     Partners: Male     Birth control/protection: Post-menopausal

## 2025-06-21 NOTE — EMTALA/ACUTE CARE TRANSFER
[facility] EMERGENCY DEPARTMENT  [address] [facility] [address]  Dept: [phone]      EMTALA TRANSFER CONSENT    NAME Vesna Langston                                         1960                              MRN [id number]    I have been informed of my rights regarding examination, treatment, and transfer   by Dr. Bryan Puckett DO    Benefits: Specialized equipment and/or services available at the receiving facility (Include comment)________________________    Risks: Potential for delay in receiving treatment, Potential deterioration of medical condition, Increased discomfort during transfer, Possible worsening of condition or death during transfer, Loss of IV      Consent for Transfer:  I acknowledge that my medical condition has been evaluated and explained to me by the emergency department physician or other qualified medical person and/or my attending physician, who has recommended that I be transferred to the service of  Accepting Physician: Dr. Castro at Accepting Facility Name, City & State : [facility]. The above potential benefits of such transfer, the potential risks associated with such transfer, and the probable risks of not being transferred have been explained to me, and I fully understand them.  The doctor has explained that, in my case, the benefits of transfer outweigh the risks.  I agree to be transferred.    I authorize the performance of emergency medical procedures and treatments upon me in both transit and upon arrival at the receiving facility.  Additionally, I authorize the release of any and all medical records to the receiving facility and request they be transported with me, if possible.  I understand that the safest mode of transportation during a medical emergency is an ambulance and that the Hospital advocates the use of this mode of transport. Risks of traveling to the receiving facility by car, including absence of medical control, life sustaining equipment,  such as oxygen, and medical personnel has been explained to me and I fully understand them.    (MAKSIM CORRECT BOX BELOW)  [  ]  I consent to the stated transfer and to be transported by ambulance/helicopter.  [  ]  I consent to the stated transfer, but refuse transportation by ambulance and accept full responsibility for my transportation by car.  I understand the risks of non-ambulance transfers and I exonerate the Hospital and its staff from any deterioration in my condition that results from this refusal.    X___________________________________________    DATE  25  TIME________  Signature of patient or legally responsible individual signing on patient behalf           RELATIONSHIP TO PATIENT_________________________          Provider Certification    NAME Vesna Langston                                         1960                              MRN [id number]    A medical screening exam was performed on the above named patient.  Based on the examination:    Condition Necessitating Transfer The primary encounter diagnosis was SOB (shortness of breath). Diagnoses of Chest pain and CHF (congestive heart failure) (HCC) were also pertinent to this visit.    Patient Condition: The patient has been stabilized such that within reasonable medical probability, no material deterioration of the patient condition or the condition of the unborn child(gus) is likely to result from the transfer    Reason for Transfer: Level of Care needed not available at this facility    Transfer Requirements: Facility B   Space available and qualified personnel available for treatment as acknowledged by    Agreed to accept transfer and to provide appropriate medical treatment as acknowledged by       Dr. Castro  Appropriate medical records of the examination and treatment of the patient are provided at the time of transfer   STAFF INITIAL WHEN COMPLETED _______  Transfer will be performed by qualified personnel from [facility]  and appropriate  transfer equipment as required, including the use of necessary and appropriate life support measures.    Provider Certification: I have examined the patient and explained the following risks and benefits of being transferred/refusing transfer to the patient/family:  General risk, such as traffic hazards, adverse weather conditions, rough terrain or turbulence, possible failure of equipment (including vehicle or aircraft), or consequences of actions of persons outside the control of the transport personnel, Risk of worsening condition, The possibility of a transport vehicle being unavailable, Unanticipated needs of medical equipment and personnel during transport      Based on these reasonable risks and benefits to the patient and/or the unborn child(gus), and based upon the information available at the time of the patient’s examination, I certify that the medical benefits reasonably to be expected from the provision of appropriate medical treatments at another medical facility outweigh the increasing risks, if any, to the individual’s medical condition, and in the case of labor to the unborn child, from effecting the transfer.    X____________________________________________ DATE 06/21/25        TIME_______      ORIGINAL - SEND TO MEDICAL RECORDS   COPY - SEND WITH PATIENT DURING TRANSFER

## 2025-06-21 NOTE — RESPIRATORY THERAPY NOTE
"RT Protocol Note  Vesna Langston 65 y.o. female MRN: [id number]  Unit/Bed#: [facility] 526-01 Encounter: [id number]    Assessment    Principal Problem:    Acute on chronic systolic (congestive) heart failure (HCC)  Active Problems:    Atrial flutter, paroxysmal (HCC)    Ischemic cardiomyopathy    History of sustained ventricular tachycardia    CAD (coronary artery disease)    Type 2 diabetes mellitus, without long-term current use of insulin (HCC)    Acquired hypothyroidism    Anemia    URI (upper respiratory infection)      Home Pulmonary Medications:  None       Past Medical History[1]  Social History[2]    Subjective         Objective    Physical Exam:   Assessment Type: Pre-treatment  General Appearance: Alert, Awake  Respiratory Pattern: Normal  Chest Assessment: Chest expansion symmetrical  Bilateral Breath Sounds: Diminished  Cough: Strong, Moist, Productive    Vitals:  Blood pressure 112/64, pulse 72, temperature 97.6 °F (36.4 °C), temperature source Axillary, resp. rate 16, height 5' 8\" (1.727 m), weight 95.7 kg (211 lb).          Imaging and other studies: Results Review Statement: No pertinent imaging studies reviewed.          Plan       Airway Clearance Plan: Flutter     Resp Comments: Pt in hospital for SOB and chest tightness. Pt has history of CHF. Pt states she has a cough that has been bringing up some dark green pleghm, but she states she feels cant get it all up. Pt given flutter device and shown how to use it. Bs diminished, pt has strong, moist cough. Will order flutter        [1]   Past Medical History:  Diagnosis Date    Acute respiratory insufficiency 03/22/2024    Allergic     Anxiety 4/24    Arrhythmia 3/22/24    Atrial fibrillation (HCC) 3/22/24    Back pain 2022    Bradycardia 3/22/24    BRCA1 negative     BRCA2 negative     Breast cyst 2000    Chronic HFrEF (heart failure with reduced ejection fraction) (HCC)     Clotting disorder (HCC) 4/1/24    Coronary artery disease     COVID-19 virus " infection 2022    Depression     Diabetes mellitus (HCC)     Disease of thyroid gland 2024    Environmental and seasonal allergies 1960    GERD (gastroesophageal reflux disease) 10/09    GI (gastrointestinal bleed) 24    Heart disease 3/24    History of placement of internal cardiac defibrillator 3/27/24    Hyperlipidemia     Hypoglycemia     ICD (implantable cardioverter-defibrillator) in place     Ischemic cardiomyopathy     Lactic acidosis 2024    Migraine 1980    Morning headache 1980    Myocardial infarction ([facility]) 3/22/2024    Nausea & vomiting 2025    Otitis media 1966    Pacemaker 3/27/24    Peptic ulceration 2024    Seasonal allergies     Sinus problem 1970    ST elevation myocardial infarction involving left anterior descending (LAD) coronary artery ([facility]) 2024    Tobacco abuse     Visual impairment    [2]   Social History  Socioeconomic History    Marital status:     Number of children: 3   Tobacco Use    Smoking status: Former     Current packs/day: 0.00     Average packs/day: 1 pack/day for 24.2 years (24.2 ttl pk-yrs)     Types: Cigarettes     Start date: 2000     Quit date: 3/22/2024     Years since quittin.2     Passive exposure: Never    Smokeless tobacco: Never    Tobacco comments:     Smoked 0.5-1 ppd; quit in 2024.    Vaping Use    Vaping status: Never Used   Substance and Sexual Activity    Alcohol use: Not Currently     Alcohol/week: 1.0 standard drink of alcohol     Types: 1 Shots of liquor per week     Comment: Every 2or 3months    Drug use: Never    Sexual activity: Not Currently     Partners: Male     Birth control/protection: Post-menopausal     Social Drivers of Health     Financial Resource Strain: Low Risk  (2023)    Received from [facility]    Overall Financial Resource Strain (CARDIA)     Difficulty of Paying Living Expenses: Not hard at all   Food Insecurity: No Food Insecurity (2025)     Nursing - Inadequate Food Risk Classification     Worried About Running Out of Food in the Last Year: Never true     Ran Out of Food in the Last Year: Never true     Ran Out of Food in the Last Year: Never true   Transportation Needs: No Transportation Needs (6/21/2025)    Nursing - Transportation Risk Classification     Lack of Transportation: No   Stress: No Stress Concern Present (2/20/2023)    Received from [facility] [place] [facility] - Occupational Stress Questionnaire     Feeling of Stress : Not at all    Received from [employer]   Intimate Partner Violence: Unknown (6/21/2025)    Nursing IPS     Physically Hurt by Someone: No     Hurt or Threatened by Someone: No   Housing Stability: Unknown (6/21/2025)    Nursing: Inadequate Housing Risk Classification     Unable to Pay for Housing in the Last Year: No     Has Housing: No

## 2025-06-21 NOTE — ED NOTES
Patient BP drops as patient sleeps. This RN informed provider. Provider instructed this RN to monitor pressure at this time.      Em Hart RN  06/21/25 0449

## 2025-06-21 NOTE — TELEPHONE ENCOUNTER
"REASON FOR CONVERSATION: Congestive Heart Failure    SYMPTOMS: weight gain, 4 lbs overnight, increase diuretic use (usually on 40 lasix PRN) has been on 80 daily all week with 80 twice today with insufficient response. +SOB, +PND and not tolerating bipap.     OTHER HEALTH INFORMATION: HFrEF ICMP    PROTOCOL DISPOSITION: Go to ED Now (or PCP Triage)    CARE ADVICE PROVIDED: ED for iv diuresis and eval per on call- relayed to patient hwo verbalized understanding    PRACTICE FOLLOW-UP: may need adjustment in diuretic dosing and if so would need new rx      Reason for Disposition   Patient sounds very sick or weak to the triager    Answer Assessment - Initial Assessment Questions  1. MAIN CONCERN OR SYMPTOM: \"What is your main concern right now?\" \"What's the main symptom you're worried about?\" (e.g., breathing difficulty, ankle swelling, weight gain).      Water retention      Weight gain 4 lbs overnight, has increased to 80 daily and now 80 BID today    2. ONSET: \"When did the congestion  start?\"      A couple a weeks    3. BREATHING DIFFICULTY: \"Are you having any difficulty breathing?\" If Yes, ask: \"How bad is it?\"  (e.g., none, mild, moderate, severe)  \"Is this worse than usual for you?\"      Had URI last month        4. EDEMA - FOOT-LEG SWELLING: \"Do you have swelling of your ankles, feet or legs?\" If Yes, ask: \"How bad is the swelling?\" (e.g., localized; mild, moderate, severe)      No LE edema but feels it all in her abdomen    5. WEIGHT - CURRENT: \"What is your weight today?\"      2014    6. WEIGHT - TARGET RANGE: \"Do you try to keep your weight in a target (goal) range?\" If Yes, ask: \"What is that range?\"      208-209    7. WEIGHT - CHANGE: \"Have you gained (or lost) weight in the past 24 hours? Past week (7 days)?\" If Yes, ask:  \"How much weight?\"      4 lbs        8. OTHER SYMPTOMS: \"Do you have any other symptoms?\" (e.g., depression, weakness or fatigue, abdomen bloating, hacky cough)      Abdominal " "bloating      SOB, +PND    9. DIURETICS: \"Are you currently taking water pills?\" (e.g., furosemide [Lasix], hydrochlorothiazide [HCTZ], bumetanide [Bumex], metolazone [Zaroxolyn]) If Yes, ask: \"What medicine are you taking, and how often?\"  \"Any recent change in dose?\"       Usually on furosemide 40 mg PRN    10. O2 SATURATION MONITOR: \"Do you use an oxygen saturation monitor (pulse oximeter) at home?\" If Yes, ask: \"What is your reading (oxygen level) today?\" \"What is your usual oxygen saturation reading?\" (e.g., 95%)        95-98%    11. HEART FAILURE HCP: \"Who treats your heart failure?\"  (e.g., cardiologist or heart specialist, heart failure clinic or center, primary care doctor)        Dr Bell    12. FLUID and SODIUM RESTRICTIONS: \"Have you been instructed to restrict your daily fluid intake or sodium (salt) intake?\" If Yes, ask: \"What are your daily limits?\"        Does not use salt    Protocols used: Heart Failure on Treatment Follow-up Call-Adult-    "

## 2025-06-22 LAB
ANION GAP SERPL CALCULATED.3IONS-SCNC: 12 MMOL/L (ref 4–13)
BUN SERPL-MCNC: 17 MG/DL (ref 5–25)
CALCIUM SERPL-MCNC: 9.5 MG/DL (ref 8.4–10.2)
CHLORIDE SERPL-SCNC: 102 MMOL/L (ref 96–108)
CO2 SERPL-SCNC: 25 MMOL/L (ref 21–32)
CREAT SERPL-MCNC: 0.83 MG/DL (ref 0.6–1.3)
ERYTHROCYTE [DISTWIDTH] IN BLOOD BY AUTOMATED COUNT: 16.9 % (ref 11.6–15.1)
GFR SERPL CREATININE-BSD FRML MDRD: 73 ML/MIN/1.73SQ M
GLUCOSE SERPL-MCNC: 105 MG/DL (ref 65–140)
GLUCOSE SERPL-MCNC: 107 MG/DL (ref 65–140)
GLUCOSE SERPL-MCNC: 111 MG/DL (ref 65–140)
GLUCOSE SERPL-MCNC: 127 MG/DL (ref 65–140)
GLUCOSE SERPL-MCNC: 141 MG/DL (ref 65–140)
HCT VFR BLD AUTO: 41.2 % (ref 34.8–46.1)
HGB BLD-MCNC: 11.9 G/DL (ref 11.5–15.4)
MCH RBC QN AUTO: 24.1 PG (ref 26.8–34.3)
MCHC RBC AUTO-ENTMCNC: 28.9 G/DL (ref 31.4–37.4)
MCV RBC AUTO: 83 FL (ref 82–98)
PLATELET # BLD AUTO: 299 THOUSANDS/UL (ref 149–390)
PMV BLD AUTO: 9.8 FL (ref 8.9–12.7)
POTASSIUM SERPL-SCNC: 4 MMOL/L (ref 3.5–5.3)
RBC # BLD AUTO: 4.94 MILLION/UL (ref 3.81–5.12)
SODIUM SERPL-SCNC: 139 MMOL/L (ref 135–147)
WBC # BLD AUTO: 8.14 THOUSAND/UL (ref 4.31–10.16)

## 2025-06-22 PROCEDURE — 85027 COMPLETE CBC AUTOMATED: CPT | Performed by: INTERNAL MEDICINE

## 2025-06-22 PROCEDURE — 87205 SMEAR GRAM STAIN: CPT | Performed by: INTERNAL MEDICINE

## 2025-06-22 PROCEDURE — 80048 BASIC METABOLIC PNL TOTAL CA: CPT | Performed by: INTERNAL MEDICINE

## 2025-06-22 PROCEDURE — 99232 SBSQ HOSP IP/OBS MODERATE 35: CPT | Performed by: NURSE PRACTITIONER

## 2025-06-22 PROCEDURE — 94664 DEMO&/EVAL PT USE INHALER: CPT

## 2025-06-22 PROCEDURE — 82948 REAGENT STRIP/BLOOD GLUCOSE: CPT

## 2025-06-22 PROCEDURE — 94760 N-INVAS EAR/PLS OXIMETRY 1: CPT

## 2025-06-22 PROCEDURE — 87070 CULTURE OTHR SPECIMN AEROBIC: CPT | Performed by: INTERNAL MEDICINE

## 2025-06-22 PROCEDURE — 99222 1ST HOSP IP/OBS MODERATE 55: CPT | Performed by: STUDENT IN AN ORGANIZED HEALTH CARE EDUCATION/TRAINING PROGRAM

## 2025-06-22 PROCEDURE — 94668 MNPJ CHEST WALL SBSQ: CPT

## 2025-06-22 RX ORDER — TORSEMIDE 20 MG/1
60 TABLET ORAL DAILY
Status: DISCONTINUED | OUTPATIENT
Start: 2025-06-22 | End: 2025-06-23

## 2025-06-22 RX ORDER — ALBUMIN HUMAN 50 G/1000ML
12.5 SOLUTION INTRAVENOUS ONCE
Status: COMPLETED | OUTPATIENT
Start: 2025-06-22 | End: 2025-06-22

## 2025-06-22 RX ADMIN — EMPAGLIFLOZIN 10 MG: 10 TABLET, FILM COATED ORAL at 09:16

## 2025-06-22 RX ADMIN — PANTOPRAZOLE SODIUM 40 MG: 40 TABLET, DELAYED RELEASE ORAL at 06:39

## 2025-06-22 RX ADMIN — TORSEMIDE 60 MG: 20 TABLET ORAL at 09:15

## 2025-06-22 RX ADMIN — DOFETILIDE 125 MCG: 0.12 CAPSULE ORAL at 18:01

## 2025-06-22 RX ADMIN — APIXABAN 5 MG: 5 TABLET, FILM COATED ORAL at 09:15

## 2025-06-22 RX ADMIN — ATORVASTATIN CALCIUM 80 MG: 80 TABLET, FILM COATED ORAL at 17:12

## 2025-06-22 RX ADMIN — SACUBITRIL AND VALSARTAN 1 TABLET: 49; 51 TABLET, FILM COATED ORAL at 17:13

## 2025-06-22 RX ADMIN — ACETAMINOPHEN 650 MG: 325 TABLET ORAL at 20:02

## 2025-06-22 RX ADMIN — PREGABALIN 75 MG: 75 CAPSULE ORAL at 09:15

## 2025-06-22 RX ADMIN — B-COMPLEX W/ C & FOLIC ACID TAB 1 TABLET: TAB at 09:15

## 2025-06-22 RX ADMIN — SACUBITRIL AND VALSARTAN 1 TABLET: 49; 51 TABLET, FILM COATED ORAL at 09:16

## 2025-06-22 RX ADMIN — FLUTICASONE PROPIONATE 1 SPRAY: 50 SPRAY, METERED NASAL at 09:18

## 2025-06-22 RX ADMIN — DOFETILIDE 125 MCG: 0.12 CAPSULE ORAL at 06:39

## 2025-06-22 RX ADMIN — ISOSORBIDE MONONITRATE 60 MG: 60 TABLET, EXTENDED RELEASE ORAL at 09:15

## 2025-06-22 RX ADMIN — PREGABALIN 75 MG: 75 CAPSULE ORAL at 21:01

## 2025-06-22 RX ADMIN — ALBUMIN (HUMAN) 12.5 G: 12.5 INJECTION, SOLUTION INTRAVENOUS at 02:54

## 2025-06-22 RX ADMIN — CLOPIDOGREL BISULFATE 75 MG: 75 TABLET, FILM COATED ORAL at 09:15

## 2025-06-22 RX ADMIN — APIXABAN 5 MG: 5 TABLET, FILM COATED ORAL at 17:12

## 2025-06-22 RX ADMIN — LEVOTHYROXINE SODIUM 75 MCG: 0.07 TABLET ORAL at 05:28

## 2025-06-22 RX ADMIN — PREGABALIN 75 MG: 75 CAPSULE ORAL at 17:12

## 2025-06-22 RX ADMIN — PANTOPRAZOLE SODIUM 40 MG: 40 TABLET, DELAYED RELEASE ORAL at 17:12

## 2025-06-22 NOTE — CASE MANAGEMENT
Case Management Discharge Planning Note    Patient name Vesna Langston  Location [facility] 526/[facility] 526-01 MRN [id number]  : 1960 Date 2025       Current Admission Date: 2025  Current Admission Diagnosis:Acute on chronic systolic (congestive) heart failure ([facility])   Patient Active Problem List    Diagnosis Date Noted    URI (upper respiratory infection) 2025    Vitamin D deficiency 2025    Acute pain 2025    GERD (gastroesophageal reflux disease) 2025    Hypothyroid 2025    Anemia 2025    Status post lumbar spinal fusion 2025    Other spondylosis with radiculopathy, lumbar region 03/10/2025    Stage 2 chronic kidney disease 2025    Other pulmonary embolism without acute cor pulmonale, unspecified chronicity ([facility]) 2025    Acute on chronic systolic (congestive) heart failure ([facility]) 2025    Obesity, morbid ([facility]) 2025    T2DM (type 2 diabetes mellitus) ([facility]) 2025    Primary osteoarthritis of right knee 10/08/2024    History of torn meniscus of right knee 10/08/2024    Chronic pain of right knee 10/08/2024    Localized edema 10/02/2024    POP (obstructive sleep apnea) 2024    Class 1 obesity due to excess calories with serious comorbidity and body mass index (BMI) of 34.0 to 34.9 in adult 2024    History of pulmonary embolism 2024    History of gastric ulcer 2024    Acquired hypothyroidism 2024    Constipation 04/10/2024    Type 2 diabetes mellitus, without long-term current use of insulin ([facility]) 2024    S/P ICD (internal cardiac defibrillator) procedure 2024    Chronic low back pain 2024    HFrEF (heart failure with reduced ejection fraction) ([facility]) 2024    Ischemic cardiomyopathy 2024    CAD (coronary artery disease) 2024    S/P coronary artery stent placement 2024    Atrial flutter, paroxysmal ([facility]) 2024    Mixed hyperlipidemia 2024    History of tobacco  abuse 03/22/2024    History of sustained ventricular tachycardia 03/22/2024    Back pain 07/31/2022    Sciatica of left side 07/31/2022      LOS (days): 1  Geometric Mean LOS (GMLOS) (days):   Days to GMLOS:     OBJECTIVE:  Risk of Unplanned Readmission Score: 25.46         Current admission status: Inpatient   Preferred Pharmacy:   [facility]pharmacy #[id number] [place] [id number]  Phone: [phone] Fax: [phone]    [id number] [place], [place] - [address] [facility]  [address] [facility] [address]  Phone: [phone] Fax: [phone]    Primary Care Provider: Melissa Montoya DO    Primary Insurance: MEDICARE  Secondary Insurance: BLUE CROSS    DISCHARGE DETAILS:    Discharge planning discussed with:: Pt  Freedom of Choice: Yes  Comments - Freedom of Choice: Choice reviewed  CM contacted family/caregiver?: No- see comments (declined)                                                                                      Additional Comments: Initial CM assessment completed w pt at bedside. Pt concerned with getting home from hospital upon dc, CM provided pt with information on how CM dept can assistance with ride home via Ly[employer]. Pt lives alone, hx of acute rehab at [facility] location. Pt expects to be d/c home w/o additional rehab needs. CM dept following for dc plan coordination.

## 2025-06-22 NOTE — CONSULTS
"Advanced Heart Failure/Pulmonary Hypertension Inpatient Note - Vesna Langston 65 y.o. female MRN: [id number]    Unit/Bed#: [facility] 526-01 Encounter: [id number]    Assessment:  65 y.o. female PMH and acute problems listed later in this note (a partial list may also be included within 'assessment' section) p/w sob, ongoing productive cough green sputum x1 month despite multiple courses of abx as outpt.    Primarily ICM, HFimpEF, LVEF 30-35%>PCI+gdmt>40-45% 10/2024 TTE, nondilated LV  LHC 03/22/2024: s/p PCI to 100% stenosis of ostial/proximal LAD. No other residual dz elsewhere.  cMRI 03/26/2024: LVEF 20%. LVIDd 4.5 cm. \"Transmural scarring of the mid anterior, anteroseptal and inferoseptal walls, the apical anterior, septal and inferior walls and the apex.\"   Coronary artery disease  S/p MI in 03/2024; received PCI to 100% stenosis of ostial/proximal LAD. No residual dz elsewhere.  History of monomorphic ventricular tachycardia              Per EP notes, felt to be scar mediated.               S/p secondary prevention ICD.  Atrial flutter, paroxysmal               Anticoagulation on Eliquis.   was on amiodarone per EP.  transitioned to dofetilide 125mcg twice daily 11/2024 during elective tikosyn admit.  PE 5/2024. CTA: Single subsegmental right lower lobe pulmonary embolus as shown on abdominal CT.   Hyperlipidemia  Diabetes mellitus, type II  Chronic back pain, multiple past surgeries, follows surgical team in [facility] [place]. Dr. Pena  History of tobacco abuse  4/7/24 CT: IMPRESSION:  Suspected small hemorrhage from the anterior wall of the distal gastric body with focal wall thickening. Bleeding due to underlying lesion or PUD is suspected. Recommend endoscopic correlation.  Past heavy NSAID use, 2024 stopped  dieting in [id number] - was having only 1000 calories a day she says, increased somewhat in 2025 to 1500 rachael/day, have discussed nutrition cx with her in past.  Lung and spleen granulomas.  5/1/25 back " "surgery found Pseudoarthrosis with loose L3-S1 screws:  Procedure(s):  Exploration of fusion mass, L3-S1: 12371  Removal of hardware, L3-S1: 20680  Arthrodesis, L5-S1 TLIF: 22633  Laminotomy/facetectomy, L5-S1: 98138   Arthrodesis, L4-5 PL: 22614   Arthrodesis, L3-4 PL: 22614   Posterior segmental instrumentation/pedicle screw fixation,L3-S1: 21608  Works in [employer] in past  Prior smoker, quit 5104-6900      Today I have reviewed all pertinent labs/imaging/data including but not limited to:    Temp:  [97.6 °F (36.4 °C)-98 °F (36.7 °C)] 97.6 °F (36.4 °C)  HR:  [69-88] 69  Resp:  [14-20] 20  BP: ()/(40-81) 125/81  SpO2:  [93 %-98 %] 98 %   Weight (last 2 days)       Date/Time Weight    06/22/25 03:21:12 95.9 (211.4)    06/21/25 1540 95.7 (211)             Intake/Output Summary (Last 24 hours) at 6/22/2025 0841  Last data filed at 6/22/2025 0740  Gross per 24 hour   Intake 1315 ml   Output 2800 ml   Net -1485 ml       Results from last 7 days   Lab Units 06/22/25  0426 06/20/25  2208   CREATININE mg/dL 0.83 0.84     Lab Results   Component Value Date    K 4.0 06/22/2025     Lab Results   Component Value Date    HGBA1C 5.9 (H) 03/28/2025     Lab Results   Component Value Date    IRI8MXDPDLQE 3.749 04/10/2025     Lab Results   Component Value Date    LDLCALC 68 03/28/2025     Lab Results   Component Value Date    BNP 45 06/20/2025      No results found for: \"NTBNP\"              Drips:        Plan:  Warm, euvolemic - after initial IV diuretics at OSH, she says she lost 10lbs and feels better now. Sob and abd fullness improving.  Bnp 45  Ongoing productive cough  Chronic fluctuating BP, usually asymptomatic hypotension as outpt    Cw po diuretic, switch PTA lasix to torsemide  Was on lasix fluctuating doses at home, had self increased up to lasix 80 bid x 3 days at home before admit without good effect.    Outside of diuretic changes, Cw home cardiac regimen    OOB and ambulate    Infx evaluation per primary    An " echo has been ordered per primary team    Studies:  Today I have reviewed all pertinent patient data/labs/imaging where available, including but not limited to the below studies. This includes my independent interpretation. Selected results may be displayed here but comprehensive listing is omitted for note clarity and can be found in the epic chart.    ECG.    Echo.    Stress.    Cath.    HPI:   65 y.o. female PMH and acute problems listed later in this note (a partial list may also be included within 'assessment' section) p/w sob, ongoing productive cough green sputum x1 month despite multiple courses of abx as outpt.  No new CP/dizziness/palpitations/syncope.  No new fatigue.  No new leg swelling, PND, pillow orthopnea.  No new fevers, chills, cough, nausea, vomiting, diarrhea, dysuria.        ROS:  10 point ROS negative except as specified in HPI    Past Medical History[1]  Problem List[2]     Current Facility-Administered Medications   Medication Dose Route Frequency Provider Last Rate    acetaminophen  650 mg Oral Q6H PRN Philippe Parkinson MD      albuterol  2 puff Inhalation Q4H PRN Philippe Parkinson MD      apixaban  5 mg Oral BID Philippe Parkinson MD      atorvastatin  80 mg Oral QPM Phiilppe Parkinson MD      clopidogrel  75 mg Oral Daily Philippe Parkinson MD      dextromethorphan-guaiFENesin  10 mL Oral Q4H PRN Philippe Parkinson MD      dofetilide  125 mcg Oral Q12H MONA Philippe Parkinson MD      Empagliflozin  10 mg Oral QAM Philippe Parkinson MD      [START ON 6/23/2025] ergocalciferol  50,000 Units Oral Weekly Philippe Parkinson MD      ferrous gluconate  324 mg Oral Every Other Day Philippe Parkinson MD      fluticasone  1 spray Nasal Daily Philippe Parkinson MD      furosemide  40 mg Intravenous BID (diuretic) Philippe Parkinson MD      insulin lispro  1-5 Units Subcutaneous TID AC Philippe Parkinson MD      insulin lispro  1-5 Units Subcutaneous HS Philippe Parkinson MD      isosorbide mononitrate  60 mg Oral Daily Philippe Parkinson MD      levothyroxine  75 mcg Oral  Early Morning Philippe Parkinson MD      metoprolol succinate  50 mg Oral HS Philippe Parkinson MD      multivitamin stress formula  1 tablet Oral Daily Philippe Parkinson MD      oxyCODONE  5 mg Oral Q6H PRN Philippe Parkinson MD      pantoprazole  40 mg Oral BID AC Philippe Parkinson MD      pregabalin  75 mg Oral TID Philippe Parkinson MD      sacubitril-valsartan  1 tablet Oral BID Philippe Parkinson MD       Allergies[3]  Social History     Socioeconomic History    Marital status:      Spouse name: Not on file    Number of children: 3    Years of education: Not on file    Highest education level: Not on file   Occupational History    Not on file   Tobacco Use    Smoking status: Former     Current packs/day: 0.00     Average packs/day: 1 pack/day for 24.2 years (24.2 ttl pk-yrs)     Types: Cigarettes     Start date: 2000     Quit date: 3/22/2024     Years since quittin.2     Passive exposure: Never    Smokeless tobacco: Never    Tobacco comments:     Smoked 0.5-1 ppd; quit in 2024.    Vaping Use    Vaping status: Never Used   Substance and Sexual Activity    Alcohol use: Not Currently     Alcohol/week: 1.0 standard drink of alcohol     Types: 1 Shots of liquor per week     Comment: Every 2or 3months    Drug use: Never    Sexual activity: Not Currently     Partners: Male     Birth control/protection: Post-menopausal   Other Topics Concern    Not on file   Social History Narrative    Not on file     Social Drivers of Health     Financial Resource Strain: Low Risk  (2023)    Received from [facility]    Overall Financial Resource Strain (CARDIA)     Difficulty of Paying Living Expenses: Not hard at all   Food Insecurity: No Food Insecurity (2025)    Nursing - Inadequate Food Risk Classification     Worried About Running Out of Food in the Last Year: Never true     Ran Out of Food in the Last Year: Never true     Ran Out of Food in the Last Year: Never true   Transportation Needs: No Transportation Needs  (6/21/2025)    Nursing - Transportation Risk Classification     Lack of Transportation: Not on file     Lack of Transportation: No   Physical Activity: Not on file   Stress: No Stress Concern Present (2/20/2023)    Received from [facility]    [employer] - Occupational Stress Questionnaire     Feeling of Stress : Not at all   Social Connections: Unknown (6/18/2024)    Received from [facility]    Social Connections     How often do you feel lonely or isolated from those around you? (Adult - for ages 18 years and over): Not on file   Intimate Partner Violence: Unknown (6/21/2025)    Nursing IPS     Feels Physically and Emotionally Safe: Not on file     Physically Hurt by Someone: Not on file     Humiliated or Emotionally Abused by Someone: Not on file     Physically Hurt by Someone: No     Hurt or Threatened by Someone: No   Housing Stability: Unknown (6/21/2025)    Nursing: Inadequate Housing Risk Classification     Has Housing: Not on file     Worried About Losing Housing: Not on file     Unable to Get Utilities: Not on file     Unable to Pay for Housing in the Last Year: No     Has Housing: No     Family History[4]    Tele: reviewed    Objective:     Physical Exam:  Temp:  [97.6 °F (36.4 °C)-98 °F (36.7 °C)] 97.6 °F (36.4 °C)  HR:  [69-88] 69  BP: ()/(40-81) 125/81  Resp:  [14-20] 20  SpO2:  [93 %-98 %] 98 %  O2 Device: None (Room air)    Weight (last 2 days)       Date/Time Weight    06/22/25 03:21:12 95.9 (211.4)    06/21/25 1540 95.7 (211)             Intake/Output Summary (Last 24 hours) at 6/22/2025 0841  Last data filed at 6/22/2025 0740  Gross per 24 hour   Intake 1315 ml   Output 2800 ml   Net -1485 ml     Constitutional: NAD, non toxic   Ears/nose/mouth/throat: atraumatic  CV: RRR, nl S1S2, no murmurs/rubs/gallups, no JVD, no HJR  Resp: ctabl  GI: Soft, NTND  MSK: no swollen joints in exposed areas  Extr: No edema, warm LE  Pysche: Normal affect  Neuro:  "appropriate in conversation  Skin: dry and intact in exposed areas     Data:   Results from last 7 days   Lab Units 06/22/25  0426 06/20/25  2208   WBC Thousand/uL 8.14 9.18   HEMOGLOBIN g/dL 11.9 10.7*   HEMATOCRIT % 41.2 35.3   PLATELETS Thousands/uL 299 336   SEGS PCT %  --  57   MONO PCT %  --  9   EOS PCT %  --  3      Results from last 7 days   Lab Units 06/22/25  0426 06/21/25  1644 06/20/25  2208   SODIUM mmol/L 139  --  135   POTASSIUM mmol/L 4.0 3.4* 3.6   CHLORIDE mmol/L 102  --  101   CO2 mmol/L 25  --  23   ANION GAP mmol/L 12  --  11   BUN mg/dL 17  --  17   CREATININE mg/dL 0.83  --  0.84   CALCIUM mg/dL 9.5  --  9.5   GLUCOSE RANDOM mg/dL 105  --  123   ALT U/L  --   --  11   AST U/L  --   --  19   ALK PHOS U/L  --   --  52   ALBUMIN g/dL  --   --  4.4   TOTAL BILIRUBIN mg/dL  --   --  0.41      No results found for: \"LDH\"    Counseling / Coordination of Care:  Greater than 50% of total time was spent with the patient and / or family counseling and / or coordination of care.  A description of the counseling / coordination of care: we discussed diagnoses, recent as well as older studies, labs, and all changes in cardiac treatment plan. All patient questions were answered.     Thank you for the opportunity to participate in the care of this patient.    Vitor Bell MD  Attending Physician  Advanced Heart Failure Cardiology  [facility]          [1]   Past Medical History:  Diagnosis Date    Acute respiratory insufficiency 03/22/2024    Allergic     Anxiety 4/24    Arrhythmia 3/22/24    Atrial fibrillation (HCC) 3/22/24    Back pain 2022    Bradycardia 3/22/24    BRCA1 negative     BRCA2 negative     Breast cyst 2000    Chronic HFrEF (heart failure with reduced ejection fraction) (HCC)     Clotting disorder (HCC) 4/1/24    Coronary artery disease     COVID-19 virus infection 11/28/2022    Depression 4/24    Diabetes mellitus (HCC)     Disease of thyroid gland 4/09/2024    " Environmental and seasonal allergies 1/1/1960    GERD (gastroesophageal reflux disease) 10/09    GI (gastrointestinal bleed) 4/1/24    Heart disease 3/24    History of placement of internal cardiac defibrillator 3/27/24    Hyperlipidemia     Hypoglycemia     ICD (implantable cardioverter-defibrillator) in place     Ischemic cardiomyopathy     Lactic acidosis 03/22/2024    Migraine 1980    Morning headache 1980    Myocardial infarction ([facility]) 3/22/2024    Nausea & vomiting 04/03/2025    Otitis media 1966    Pacemaker 3/27/24    Peptic ulceration 4/2024    Seasonal allergies     Sinus problem 1970    ST elevation myocardial infarction involving left anterior descending (LAD) coronary artery ([facility]) 03/22/2024    Tobacco abuse     Visual impairment 1967   [2]   Patient Active Problem List  Diagnosis    Atrial flutter, paroxysmal ([facility])    Mixed hyperlipidemia    History of tobacco abuse    Ischemic cardiomyopathy    Chronic low back pain    HFrEF (heart failure with reduced ejection fraction) ([facility])    S/P ICD (internal cardiac defibrillator) procedure    Back pain    Sciatica of left side    History of sustained ventricular tachycardia    CAD (coronary artery disease)    S/P coronary artery stent placement    Type 2 diabetes mellitus, without long-term current use of insulin ([facility])    Constipation    Acquired hypothyroidism    History of pulmonary embolism    History of gastric ulcer    Class 1 obesity due to excess calories with serious comorbidity and body mass index (BMI) of 34.0 to 34.9 in adult    POP (obstructive sleep apnea)    Localized edema    Primary osteoarthritis of right knee    History of torn meniscus of right knee    Chronic pain of right knee    Stage 2 chronic kidney disease    Other pulmonary embolism without acute cor pulmonale, unspecified chronicity ([facility])    Acute on chronic systolic (congestive) heart failure ([facility])    Obesity, morbid ([facility])    T2DM (type 2 diabetes mellitus) ([facility])    Other spondylosis  with radiculopathy, lumbar region    Status post lumbar spinal fusion    Anemia    Acute pain    GERD (gastroesophageal reflux disease)    Hypothyroid    Vitamin D deficiency    URI (upper respiratory infection)   [3]   Allergies  Allergen Reactions    Fish-Derived Products - Food Allergy Anaphylaxis     Cannot have all seafood products    Shellfish-Derived Products - Food Allergy Anaphylaxis    Azithromycin Hives and Rash   [4]   Family History  Adopted: Yes   Problem Relation Name Age of Onset    Depression Mother Unknown     Sleep apnea Mother Unknown     Heart disease Father Unknown     Snoring Father Unknown     Restless legs syndrome Father Unknown     OCD Daughter

## 2025-06-22 NOTE — ASSESSMENT & PLAN NOTE
[facility] 3/2024 that s/p PCI to 100% ostial/proximal LAD stenosis  Continue Plavix, Eliquis, high intensity atorvastatin and beta-blocker  EKG with no acute ischemic changes.    Troponins negative

## 2025-06-22 NOTE — ASSESSMENT & PLAN NOTE
Patient reports cough, chest tightness and shortness of breath for multiple weeks.  She reports bringing up dark phlegm.  Symptoms could be related to her CHF exacerbation.  She received multiple antibiotic courses recently for sinusitis and URI  COVID/flu/RSV.  Negative  Supportive care  Multiple chest x-rays and CT chest on June were negative for pneumonia.

## 2025-06-22 NOTE — PROGRESS NOTES
[facility]  Progress Note  Name: Vesna Langston I MRN: [id number]  Unit/Bed#: PPHP 526-01I Date of Admission: 6/21/2025   Date of Service: 6/21/2025  I Hospital Day: 1    * Acute on chronic systolic (congestive) heart failure (HCC)  Assessment & Plan  Wt Readings from Last 3 Encounters:   06/22/25 95.9 kg (211 lb 6.4 oz)   06/20/25 99.7 kg (219 lb 12.8 oz)   06/16/25 96.6 kg (212 lb 15.4 oz)   Patient presented with complaints of worsening shortness of breath and chest tightness with associated weight gain despite taking Lasix 80 mg bid.   Patient transferred to [facility] for heart failure evaluation  Most recent echocardiogram 10/2024 showed EF of 40-45% improved from prior of 30%  Home regimen diuretic is Lasix 40 mg daily as needed however has been using Lasix 80 mg twice daily for the last few days  Currently on IV Lasix 40 mg twice daily  Home regimen GDMT is Toprol-XL 50 mg daily, Entresto 49/51 mg twice daily, Jardiance 10 mg daily and Isordil 60 mg daily Home meds have been continued with hold parameters  Patient received IV Lasix 40 mg x1 overnight with good urine output per patient.  Patient was started on place on IV Lasix 40 mg twice daily and titrate as BP allows.  Monitor I/O's, daily weights and monitor for electrolyte disturbances and nephrotoxicity while on diuretics.  Heart failure has been consulted patient overall responded well to the IV Lasix and have been transitioned to oral torsemide   Trend echocardiogram   If remains euvolemic and no major changes on echocardiogram will likely clear for discharge tomorrow  Telemetry monitoring  I/O low-sodium diet      URI (upper respiratory infection)  Assessment & Plan  Patient reports cough, chest tightness and shortness of breath for multiple weeks.  She reports bringing up dark phlegm.  Symptoms could be related to her CHF exacerbation.  She received multiple antibiotic courses recently for sinusitis and  URI  COVID/flu/RSV.  Negative  Supportive care  Multiple chest x-rays and CT chest on June were negative for pneumonia.    Anemia  Assessment & Plan  Iron studies consistent with BIANKA  Hemoglobin stable around mid 9-10s since May 2025  Continue iron supplements  Monitor CBC while inpatient    Acquired hypothyroidism  Assessment & Plan  Continue levothyroxine 75 mcg daily    Type 2 diabetes mellitus, without long-term current use of insulin (HCC)  Assessment & Plan  Lab Results   Component Value Date    HGBA1C 5.9 (H) 03/28/2025       Recent Labs     06/21/25  1029 06/21/25  1646 06/21/25  2129 06/22/25  0554   POCGLU 117 118 118 111         Blood Sugar Average: Last 72 hrs:  (P) [id number]-controlled.    Continue with sliding scale insulin while inpatient.   Home regimen is weekly Ozempic.   Maintained on Jardiance 10 mg daily as part of GDMT.  Hypoglycemia protocol      CAD (coronary artery disease)  Assessment & Plan  [facility] 3/2024 that s/p PCI to 100% ostial/proximal LAD stenosis  Continue Plavix, Eliquis, high intensity atorvastatin and beta-blocker  EKG with no acute ischemic changes.    Troponins negative    History of sustained ventricular tachycardia  Assessment & Plan  Noted that felt to be scar mediated per reviewing EP notes.  S/p ICD.    Ischemic cardiomyopathy  Assessment & Plan  s/p PCI to 100% ostial/proximal LAD stenosis 3/2024  GDMT as above.     Atrial flutter, paroxysmal (HCC)  Assessment & Plan  Heart rates are currently controlled  Continue dofetilide 125 mcg twice daily and Toprol XL 50 mg daily  Continue Eliquis 5 mg twice daily          VTE Pharmacologic Prophylaxis:   Eliquis    Mobility:   Basic Mobility Inpatient Raw Score: 24  JH-HLM Goal: 8: Walk 250 feet or more  JH-HLM Achieved: 6: Walk 10 steps or more  JH-HLM Goal achieved. Continue to encourage appropriate mobility.    Patient Centered Rounds: I evaluated the patient without nursing staff present due to with another  patient   Discussions with Specialists or Other Care Team Provider: Heart failure note reviewed    Education and Discussions with Family / Patient: Patient declined call to .     Total Time Spent on Date of Encounter in care of patient: 45 mins. This time was spent on one or more of the following: performing physical exam; counseling and coordination of care; obtaining or reviewing history; documenting in the medical record; reviewing/ordering tests, medications or procedures; communicating with other healthcare professionals and discussing with patient's family/caregivers.    Current Length of Stay: 1 day(s)  Current Patient Status: Inpatient   Certification Statement: Acute on chronic CHF with need for IV diuresis with transition to oral pending further diagnostics  Discharge Plan: Anticipate discharge tomorrow to home.    Code Status: Level 1 - Full Code    Subjective:   Patient overall states she feels a little tired but overall feels better her breathing has improved and she has been able to walk around the unit without difficulty.  Patient reports she has been very well up at this point maintaining her fluid restrictions her diet and diuretic compliance.  Patient's main concern is about getting home as she has no form of transportation and is from the [facility].  Case management into the room and reassured patient we can help to get her transportation home.  Patient is hoping that the echocardiogram will show good results tomorrow and that she can discharge home.    Objective:     Vitals:   Temp (24hrs), Av.8 °F (36.6 °C), Min:97.6 °F (36.4 °C), Max:98 °F (36.7 °C)    Temp:  [97.6 °F (36.4 °C)-98 °F (36.7 °C)] 98 °F (36.7 °C)  HR:  [69-88] 83  Resp:  [16-20] 20  BP: ()/(40-81) 99/64  SpO2:  [93 %-98 %] 97 %  Body mass index is 32.14 kg/m².     Input and Output Summary (last 24 hours):     Intake/Output Summary (Last 24 hours) at 2025 5598  Last data filed at 2025 0562  Gross  per 24 hour   Intake 2090 ml   Output 5200 ml   Net -3110 ml       Physical Exam:   Physical Exam  Vitals and nursing note reviewed.   Constitutional:       General: She is not in acute distress.     Appearance: She is well-developed.   HENT:      Head: Normocephalic and atraumatic.     Eyes:      Conjunctiva/sclera: Conjunctivae normal.       Cardiovascular:      Rate and Rhythm: Normal rate and regular rhythm.      Heart sounds: No murmur heard.  Pulmonary:      Effort: Pulmonary effort is normal. No respiratory distress.      Breath sounds: Normal breath sounds.   Abdominal:      Palpations: Abdomen is soft.      Tenderness: There is no abdominal tenderness.     Musculoskeletal:         General: No swelling.      Cervical back: Neck supple.     Skin:     General: Skin is warm and dry.      Capillary Refill: Capillary refill takes less than 2 seconds.     Neurological:      Mental Status: She is alert and oriented to person, place, and time.     Psychiatric:         Mood and Affect: Mood normal.           Additional Data:     Labs:  Results from last 7 days   Lab Units 06/22/25  0426 06/20/25  2208   WBC Thousand/uL 8.14 9.18   HEMOGLOBIN g/dL 11.9 10.7*   HEMATOCRIT % 41.2 35.3   PLATELETS Thousands/uL 299 336   SEGS PCT %  --  57   LYMPHO PCT %  --  30   MONO PCT %  --  9   EOS PCT %  --  3     Results from last 7 days   Lab Units 06/22/25  0426 06/21/25  1644 06/20/25  2208   SODIUM mmol/L 139  --  135   POTASSIUM mmol/L 4.0   < > 3.6   CHLORIDE mmol/L 102  --  101   CO2 mmol/L 25  --  23   BUN mg/dL 17  --  17   CREATININE mg/dL 0.83  --  0.84   ANION GAP mmol/L 12  --  11   CALCIUM mg/dL 9.5  --  9.5   ALBUMIN g/dL  --   --  4.4   TOTAL BILIRUBIN mg/dL  --   --  0.41   ALK PHOS U/L  --   --  52   ALT U/L  --   --  11   AST U/L  --   --  19   GLUCOSE RANDOM mg/dL 105  --  123    < > = values in this interval not displayed.     Results from last 7 days   Lab Units 06/20/25  2208   INR  1.08     Results from  last 7 days   Lab Units 06/22/25  1040 06/22/25  0554 06/21/25  2129 06/21/25  1646 06/21/25  1029   POC GLUCOSE mg/dl 141* 111 118 118 117               Lines/Drains:  Invasive Devices       Peripheral Intravenous Line  Duration             Peripheral IV 06/20/25 Left Antecubital 1 day                      Telemetry:  Telemetry Orders (From admission, onward)               24 Hour Telemetry Monitoring  Continuous x 24 Hours (Telem)        Expiring   Question:  Reason for 24 Hour Telemetry  Answer:  Decompensated CHF- and any one of the following: continuous diuretic infusion or total diuretic dose >200 mg daily, associated electrolyte derangement (I.e. K < 3.0), inotropic drip (continuous infusion), hx of ventricular arrhythmia, or new EF < 35%                     Telemetry Reviewed: Normal Sinus Rhythm  Indication for Continued Telemetry Use: Acute CHF on >200 mg lasix/day or equivalent dose or with new reduced EF.              Imaging: Radiology Review: EKG was reviewed.     Recent Cultures (last 7 days):   Results from last 7 days   Lab Units 06/22/25  0300   GRAM STAIN RESULT  1+ Polys*  1+ Epithelial Cells*  1+ Gram negative rods*  1+ Gram positive cocci in pairs*       Last 24 Hours Medication List:   Current Facility-Administered Medications   Medication Dose Route Frequency Provider Last Rate    acetaminophen  650 mg Oral Q6H PRN Philippe Parkinson MD      albuterol  2 puff Inhalation Q4H PRN Philippe Parkinson MD      apixaban  5 mg Oral BID Philippe Parkinson MD      atorvastatin  80 mg Oral QPM Philippe Parkinson MD      clopidogrel  75 mg Oral Daily Philippe Parkinson MD      dextromethorphan-guaiFENesin  10 mL Oral Q4H PRN Philippe Parkinson MD      dofetilide  125 mcg Oral Q12H [facility] Philippe Parkinson MD      Empagliflozin  10 mg Oral QAM Philippe Parkinson MD      [START ON 6/23/2025] ergocalciferol  50,000 Units Oral Weekly Philippe Parkinson MD      ferrous gluconate  324 mg Oral Every Other Day Philippe Parkinson MD      fluticasone  1 spray  Nasal Daily Philippe Parkinson MD      insulin lispro  1-5 Units Subcutaneous TID AC Philippe Parkinson MD      insulin lispro  1-5 Units Subcutaneous HS Philippe Parkinson MD      isosorbide mononitrate  60 mg Oral Daily Philippe Parkinson MD      levothyroxine  75 mcg Oral Early Morning Philippe Parkinson MD      metoprolol succinate  50 mg Oral HS Philippe Parkinson MD      multivitamin stress formula  1 tablet Oral Daily Philippe Parkinson MD      oxyCODONE  5 mg Oral Q6H PRN Philippe Parkinson MD      pantoprazole  40 mg Oral BID AC Philippe Parkinson MD      pregabalin  75 mg Oral TID Philippe Parkinson MD      sacubitril-valsartan  1 tablet Oral BID Philippe Parkinson MD      torsemide  60 mg Oral Daily Vitor Bell MD          Today, Patient Was Seen By: MAHI Watts    **Please Note: This note may have been constructed using a voice recognition system.**

## 2025-06-22 NOTE — ASSESSMENT & PLAN NOTE
Heart rates are currently controlled  Continue dofetilide 125 mcg twice daily and Toprol XL 50 mg daily  Continue Eliquis 5 mg twice daily

## 2025-06-22 NOTE — ASSESSMENT & PLAN NOTE
----- Message from Timoteo Kellogg sent at 3/17/2020  3:14 PM CDT -----  Contact: Pt  Pt called and believes he missed a call from your office    Pt can be reached at [phone]   Wt Readings from Last 3 Encounters:   06/23/25 95.3 kg (210 lb)   06/20/25 99.7 kg (219 lb 12.8 oz)   06/16/25 96.6 kg (212 lb 15.4 oz)   Patient presented with complaints of worsening shortness of breath and chest tightness with associated weight gain despite taking Lasix 80 mg bid.   Patient transferred to [facility] for heart failure evaluation  Most recent echocardiogram 10/2024 showed EF of 40-45% improved from prior of 30%  Home regimen diuretic is Lasix 40 mg daily as needed however has been using Lasix 80 mg twice daily for the last few days  Home regimen GDMT is Toprol-XL 50 mg daily, Entresto 49/51 mg twice daily, Jardiance 10 mg daily and Isordil 60 mg daily   Plan  Currently on Demadex 40 mg daily and had great urine output significantly better in terms of breathing and swelling.  Decreased entresto dose due to hypotension  Discussed with heart failure team want to monitor creatinine for 1 more day and possible discharge tomorrow continue current management  Awaiting echocardiogram as well.  Per cardiology likely discharge tomorrow no needs

## 2025-06-22 NOTE — ASSESSMENT & PLAN NOTE
Iron studies consistent with BIANKA  Hemoglobin stable around mid 9-10s since May 2025  Continue iron supplements  Monitor CBC while inpatient

## 2025-06-23 ENCOUNTER — APPOINTMENT (INPATIENT)
Dept: NON INVASIVE DIAGNOSTICS | Facility: HOSPITAL | Age: 65
End: 2025-06-23
Attending: INTERNAL MEDICINE
Payer: COMMERCIAL

## 2025-06-23 ENCOUNTER — PATIENT OUTREACH (OUTPATIENT)
Dept: CARDIOLOGY CLINIC | Facility: CLINIC | Age: 65
End: 2025-06-23

## 2025-06-23 LAB
ANION GAP SERPL CALCULATED.3IONS-SCNC: 13 MMOL/L (ref 4–13)
AORTIC ROOT: 3.3 CM
ASCENDING AORTA: 3.1 CM
ATRIAL RATE: 71 BPM
BSA FOR ECHO PROCEDURE: 2.09 M2
BUN SERPL-MCNC: 26 MG/DL (ref 5–25)
CALCIUM SERPL-MCNC: 9.3 MG/DL (ref 8.4–10.2)
CHLORIDE SERPL-SCNC: 98 MMOL/L (ref 96–108)
CO2 SERPL-SCNC: 28 MMOL/L (ref 21–32)
CREAT SERPL-MCNC: 1.34 MG/DL (ref 0.6–1.3)
E WAVE DECELERATION TIME: 299 MS
E/A RATIO: 0.49
ERYTHROCYTE [DISTWIDTH] IN BLOOD BY AUTOMATED COUNT: 16.7 % (ref 11.6–15.1)
FRACTIONAL SHORTENING: 23 (ref 28–44)
GFR SERPL CREATININE-BSD FRML MDRD: 41 ML/MIN/1.73SQ M
GLUCOSE SERPL-MCNC: 103 MG/DL (ref 65–140)
GLUCOSE SERPL-MCNC: 104 MG/DL (ref 65–140)
GLUCOSE SERPL-MCNC: 107 MG/DL (ref 65–140)
GLUCOSE SERPL-MCNC: 133 MG/DL (ref 65–140)
GLUCOSE SERPL-MCNC: 144 MG/DL (ref 65–140)
HCT VFR BLD AUTO: 36.5 % (ref 34.8–46.1)
HGB BLD-MCNC: 11.3 G/DL (ref 11.5–15.4)
INTERVENTRICULAR SEPTUM IN DIASTOLE (PARASTERNAL SHORT AXIS VIEW): 1.4 CM
INTERVENTRICULAR SEPTUM: 1.4 CM (ref 0.6–1.1)
LAAS-AP2: 25.1 CM2
LAAS-AP4: 19.9 CM2
LEFT ATRIUM SIZE: 3.8 CM
LEFT ATRIUM VOLUME (MOD BIPLANE): 70 ML
LEFT ATRIUM VOLUME INDEX (MOD BIPLANE): 33.5 ML/M2
LEFT INTERNAL DIMENSION IN SYSTOLE: 3.6 CM (ref 2.1–4)
LEFT VENTRICULAR INTERNAL DIMENSION IN DIASTOLE: 4.7 CM (ref 3.5–6)
LEFT VENTRICULAR POSTERIOR WALL IN END DIASTOLE: 0.9 CM
LEFT VENTRICULAR STROKE VOLUME: 47 ML
LV EF US.2D.A4C+ESTIMATED: 46 %
LVSV (TEICH): 47 ML
MCH RBC QN AUTO: 24.9 PG (ref 26.8–34.3)
MCHC RBC AUTO-ENTMCNC: 31 G/DL (ref 31.4–37.4)
MCV RBC AUTO: 80 FL (ref 82–98)
MV E'TISSUE VEL-LAT: 8 CM/S
MV E'TISSUE VEL-SEP: 6 CM/S
MV PEAK A VEL: 0.81 M/S
MV PEAK E VEL: 40 CM/S
MV STENOSIS PRESSURE HALF TIME: 87 MS
MV VALVE AREA P 1/2 METHOD: 2.53
P AXIS: 58 DEGREES
PLATELET # BLD AUTO: 323 THOUSANDS/UL (ref 149–390)
PMV BLD AUTO: 10 FL (ref 8.9–12.7)
POTASSIUM SERPL-SCNC: 3.5 MMOL/L (ref 3.5–5.3)
PR INTERVAL: 166 MS
QRS AXIS: 47 DEGREES
QRSD INTERVAL: 92 MS
QT INTERVAL: 420 MS
QTC INTERVAL: 456 MS
RBC # BLD AUTO: 4.54 MILLION/UL (ref 3.81–5.12)
RIGHT ATRIUM AREA SYSTOLE A4C: 19.5 CM2
RIGHT VENTRICLE ID DIMENSION: 4 CM
SL CV LEFT ATRIUM LENGTH A2C: 5.7 CM
SL CV LV EF: 45
SL CV PED ECHO LEFT VENTRICLE DIASTOLIC VOLUME (MOD BIPLANE) 2D: 102 ML
SL CV PED ECHO LEFT VENTRICLE SYSTOLIC VOLUME (MOD BIPLANE) 2D: 55 ML
SODIUM SERPL-SCNC: 139 MMOL/L (ref 135–147)
T WAVE AXIS: 66 DEGREES
TRICUSPID ANNULAR PLANE SYSTOLIC EXCURSION: 2 CM
VENTRICULAR RATE: 71 BPM
WBC # BLD AUTO: 8.85 THOUSAND/UL (ref 4.31–10.16)

## 2025-06-23 PROCEDURE — 94668 MNPJ CHEST WALL SBSQ: CPT

## 2025-06-23 PROCEDURE — 93306 TTE W/DOPPLER COMPLETE: CPT | Performed by: INTERNAL MEDICINE

## 2025-06-23 PROCEDURE — 99232 SBSQ HOSP IP/OBS MODERATE 35: CPT

## 2025-06-23 PROCEDURE — 85027 COMPLETE CBC AUTOMATED: CPT | Performed by: STUDENT IN AN ORGANIZED HEALTH CARE EDUCATION/TRAINING PROGRAM

## 2025-06-23 PROCEDURE — 80048 BASIC METABOLIC PNL TOTAL CA: CPT | Performed by: STUDENT IN AN ORGANIZED HEALTH CARE EDUCATION/TRAINING PROGRAM

## 2025-06-23 PROCEDURE — 93306 TTE W/DOPPLER COMPLETE: CPT

## 2025-06-23 PROCEDURE — 99232 SBSQ HOSP IP/OBS MODERATE 35: CPT | Performed by: INTERNAL MEDICINE

## 2025-06-23 PROCEDURE — 82948 REAGENT STRIP/BLOOD GLUCOSE: CPT

## 2025-06-23 RX ORDER — POTASSIUM CHLORIDE 1500 MG/1
40 TABLET, EXTENDED RELEASE ORAL ONCE
Status: COMPLETED | OUTPATIENT
Start: 2025-06-23 | End: 2025-06-23

## 2025-06-23 RX ORDER — ALBUMIN HUMAN 50 G/1000ML
12.5 SOLUTION INTRAVENOUS ONCE
Status: COMPLETED | OUTPATIENT
Start: 2025-06-23 | End: 2025-06-23

## 2025-06-23 RX ORDER — TORSEMIDE 20 MG/1
40 TABLET ORAL DAILY
Status: DISCONTINUED | OUTPATIENT
Start: 2025-06-24 | End: 2025-06-24

## 2025-06-23 RX ADMIN — METOPROLOL SUCCINATE 50 MG: 50 TABLET, EXTENDED RELEASE ORAL at 22:49

## 2025-06-23 RX ADMIN — DOFETILIDE 125 MCG: 0.12 CAPSULE ORAL at 06:43

## 2025-06-23 RX ADMIN — CLOPIDOGREL BISULFATE 75 MG: 75 TABLET, FILM COATED ORAL at 08:24

## 2025-06-23 RX ADMIN — SACUBITRIL AND VALSARTAN 1 TABLET: 24; 26 TABLET, FILM COATED ORAL at 21:23

## 2025-06-23 RX ADMIN — ATORVASTATIN CALCIUM 80 MG: 80 TABLET, FILM COATED ORAL at 19:07

## 2025-06-23 RX ADMIN — TORSEMIDE 60 MG: 20 TABLET ORAL at 08:28

## 2025-06-23 RX ADMIN — FERROUS GLUCONATE 324 MG: 324 TABLET ORAL at 11:58

## 2025-06-23 RX ADMIN — LEVOTHYROXINE SODIUM 75 MCG: 0.07 TABLET ORAL at 06:43

## 2025-06-23 RX ADMIN — PREGABALIN 75 MG: 75 CAPSULE ORAL at 21:23

## 2025-06-23 RX ADMIN — PREGABALIN 75 MG: 75 CAPSULE ORAL at 08:25

## 2025-06-23 RX ADMIN — APIXABAN 5 MG: 5 TABLET, FILM COATED ORAL at 19:07

## 2025-06-23 RX ADMIN — ALBUMIN (HUMAN) 12.5 G: 12.5 INJECTION, SOLUTION INTRAVENOUS at 03:04

## 2025-06-23 RX ADMIN — DOFETILIDE 125 MCG: 0.12 CAPSULE ORAL at 19:07

## 2025-06-23 RX ADMIN — POTASSIUM CHLORIDE 40 MEQ: 1500 TABLET, EXTENDED RELEASE ORAL at 13:04

## 2025-06-23 RX ADMIN — PERFLUTREN 0.6 ML/MIN: 6.52 INJECTION, SUSPENSION INTRAVENOUS at 11:15

## 2025-06-23 RX ADMIN — APIXABAN 5 MG: 5 TABLET, FILM COATED ORAL at 08:24

## 2025-06-23 RX ADMIN — SACUBITRIL AND VALSARTAN 1 TABLET: 49; 51 TABLET, FILM COATED ORAL at 08:28

## 2025-06-23 RX ADMIN — PREGABALIN 75 MG: 75 CAPSULE ORAL at 16:40

## 2025-06-23 RX ADMIN — B-COMPLEX W/ C & FOLIC ACID TAB 1 TABLET: TAB at 08:25

## 2025-06-23 RX ADMIN — PANTOPRAZOLE SODIUM 40 MG: 40 TABLET, DELAYED RELEASE ORAL at 06:43

## 2025-06-23 RX ADMIN — ERGOCALCIFEROL CAPSULES, 50000 UNITS: 1.25 CAPSULE ORAL at 08:24

## 2025-06-23 RX ADMIN — FLUTICASONE PROPIONATE 1 SPRAY: 50 SPRAY, METERED NASAL at 08:24

## 2025-06-23 RX ADMIN — EMPAGLIFLOZIN 10 MG: 10 TABLET, FILM COATED ORAL at 08:24

## 2025-06-23 RX ADMIN — PANTOPRAZOLE SODIUM 40 MG: 40 TABLET, DELAYED RELEASE ORAL at 16:40

## 2025-06-23 NOTE — UTILIZATION REVIEW
Initial Clinical Review    Admission: Date/Time/Statement:   Admission Orders (From admission, onward)       Ordered        06/21/25 1538  INPATIENT ADMISSION  Once                          Orders Placed This Encounter   Procedures    INPATIENT ADMISSION     Standing Status:   Standing     Number of Occurrences:   1     Level of Care:   Med Surg [16]     Estimated length of stay:   More than 2 Midnights     Certification:   I certify that inpatient services are medically necessary for this patient for a duration of greater than two midnights. See H&P and MD Progress Notes for additional information about the patient's course of treatment.     ED Arrival Information       Patient not seen in ED                       No chief complaint on file.      Initial Presentation: 65 y.o. female with a PMH of CAD,ICM, CHF, VT s/p ICD, Atrial flutter on Eliquis, hyperlipidemia, T2DM, chronic back pain and hypothyroidism was transferred from [facility] to [facility] for a higher level of care.  Pr initially presented to [facility] on 06/20 w/ worsening SOB and chest tightness associated w/ wt gain despite taking Lasix 80 mg bid. Also reports cough w/ dark phlegm for multiple weeks. Received IV Lasix 40 mg x1 overnight with good urine output. Had multiple episodes of hypotension prior to transfer.   Transferred to [facility] for HF eval.  On arrival to [facility], pt's BP stable. Rhonchi, diminished breath sounds bilaterally, b/l LE edema noted on exam.     Admit as Inpatient for evaluation and treatment of acute on chronic systolic HF, URI.  Plan: IV Lasix 40 mg twice daily and titrate as BP allows. Monitor I/O's, daily weights. Mon electrolytes. Mon renal fxn. HF consulted. Mon on tele. Echo. Check COVID/flu/RSV. Supportive care    Anticipated Length of Stay/Certification Statement: Patient will be admitted on an inpatient basis with an anticipated length of stay of greater than 2 midnights secondary to CHF.     Date: 06/22   Day 2:   HF  Consult: Pt presented w/  sob, ongoing productive cough green sputum x1 month despite multiple courses of abx as outpt. On exam, Warm, euvolemic - after initial IV diuretics at OSH, pt reports she lost 10lbs and feels better now. Sob and abd fullness improving. Bnp 45. Ongoing productive cough. Chronic fluctuating BP, usually asymptomatic hypotension as outpt. Was on lasix fluctuating doses at home, had self increased up to lasix 80 bid x 3 days at home before admit without good effect. Cw po diuretic, switch PTA lasix to torsemide. Cw home cardiac regimen     IM Notes: Pt reports that she feels a little tired but overall feels better her breathing has improved. Responded well to IV Lasix. Has been transitioned to Torsemide. Trend echocardiogram. Telemetry monitoring. I/O low-sodium diet    ED Treatment-Medication Administration - No Administrations Displayed (No Start Event Found)       None            Scheduled Medications:  apixaban, 5 mg, Oral, BID  atorvastatin, 80 mg, Oral, QPM  clopidogrel, 75 mg, Oral, Daily  dofetilide, 125 mcg, Oral, Q12H MONA  Empagliflozin, 10 mg, Oral, QAM  ergocalciferol, 50,000 Units, Oral, Weekly  ferrous gluconate, 324 mg, Oral, Every Other Day  fluticasone, 1 spray, Nasal, Daily  insulin lispro, 1-5 Units, Subcutaneous, TID AC  insulin lispro, 1-5 Units, Subcutaneous, HS  isosorbide mononitrate, 60 mg, Oral, Daily  levothyroxine, 75 mcg, Oral, Early Morning  metoprolol succinate, 50 mg, Oral, HS  multivitamin stress formula, 1 tablet, Oral, Daily  pantoprazole, 40 mg, Oral, BID AC  pregabalin, 75 mg, Oral, TID  [Held by provider] sacubitril-valsartan, 1 tablet, Oral, BID  torsemide, 60 mg, Oral, Daily  furosemide (LASIX) injection 40 mg  Dose: 40 mg  Freq: 2 times daily (diuretic) Route: IV  Start: 06/21/25 1615 End: 06/22/25 0846    Continuous IV Infusions: none     PRN Meds:  acetaminophen, 650 mg, Oral, Q6H PRN  albuterol, 2 puff, Inhalation, Q4H  PRN  dextromethorphan-guaiFENesin, 10 mL, Oral, Q4H PRN  oxyCODONE, 5 mg, Oral, Q6H PRN      ED Triage Vitals   Temperature Pulse Respirations Blood Pressure SpO2 Pain Score   06/21/25 1550 06/21/25 1550 06/21/25 1550 06/21/25 1550 06/21/25 1853 06/21/25 1615   97.6 °F (36.4 °C) 72 16 112/64 96 % 3     Weight (last 2 days)       Date/Time Weight    06/23/25 03:50:46 95.3 (210.2)    06/22/25 03:21:12 95.9 (211.4)    06/21/25 1540 95.7 (211)            Vital Signs (last 3 days)       Date/Time Temp Pulse Resp BP MAP (mmHg) SpO2 O2 Device Patient Position - Orthostatic VS Bryan Coma Scale Score Pain    06/23/25 0829 -- -- -- -- -- -- -- -- 15 No Pain    06/23/25 0825 -- 70 -- 102/52 -- -- -- -- -- --    06/23/25 07:10:35 97.3 °F (36.3 °C) 70 16 94/57 69 98 % None (Room air) Sitting -- --    06/23/25 0700 -- -- -- -- -- 97 % -- -- -- --    06/23/25 03:50:46 -- 69 -- 99/57 71 95 % -- -- -- --    06/23/25 03:28:30 -- 70 -- 81/59 66 98 % -- -- -- --    06/23/25 03:04:08 -- 73 -- 71/41 51 99 % -- -- -- --    06/23/25 02:40:08 97.6 °F (36.4 °C) 70 16 72/40 51 98 % -- -- -- --    06/22/25 21:00:43 -- 87 -- 96/65 75 94 % -- -- -- --    06/22/25 2018 -- -- -- -- -- 96 % None (Room air) -- -- --    06/22/25 1930 -- -- -- -- -- 94 % None (Room air) -- 15 No Pain    06/22/25 19:29:16 98.5 °F (36.9 °C) 90 -- 97/65 76 94 % -- -- -- --    06/22/25 15:20:11 98 °F (36.7 °C) 83 -- 99/64 76 97 % -- -- -- --    06/22/25 1200 -- -- -- -- -- -- -- -- -- No Pain    06/22/25 0800 -- -- -- -- -- -- -- -- -- No Pain    06/22/25 07:35:30 97.6 °F (36.4 °C) 69 20 125/81 96 98 % None (Room air) Sitting -- --    06/22/25 03:21:12 -- 71 -- 97/57 70 93 % -- -- -- --    06/22/25 0300 -- -- -- 80/45 57 94 % -- -- -- --    06/22/25 02:51:36 -- 70 -- 80/45 57 95 % -- -- -- --    06/22/25 02:38:54 98 °F (36.7 °C) 70 16 74/40 51 96 % -- Lying -- --    06/21/25 21:28:19 97.6 °F (36.4 °C) 70 -- 113/71 85 96 % -- -- -- --    06/21/25 2126 -- 70 -- 113/71 --  -- -- -- -- --    06/21/25 2100 -- -- -- -- -- 98 % None (Room air) -- 15 No Pain    06/21/25 1944 -- -- -- -- -- 98 % None (Room air) -- -- --    06/21/25 18:54:06 97.8 °F (36.6 °C) 88 18 108/74 85 97 % -- Lying -- --    06/21/25 18:53:37 97.8 °F (36.6 °C) 86 -- 108/74 85 96 % -- -- -- --    06/21/25 1615 -- -- -- -- -- -- -- -- -- 3    06/21/25 1550 97.6 °F (36.4 °C) 72 16 112/64 -- -- -- Sitting -- --              Pertinent Labs/Diagnostic Test Results:   Radiology:  No orders to display     Cardiology:  No orders to display     GI:  No orders to display       Results from last 7 days   Lab Units 06/21/25  1645   SARS-COV-2  Negative     Results from last 7 days   Lab Units 06/23/25 0325 06/22/25 0426 06/20/25  2208   WBC Thousand/uL 8.85 8.14 9.18   HEMOGLOBIN g/dL 11.3* 11.9 10.7*   HEMATOCRIT % 36.5 41.2 35.3   PLATELETS Thousands/uL 323 299 336   TOTAL NEUT ABS Thousands/µL  --   --  5.22         Results from last 7 days   Lab Units 06/23/25 0325 06/22/25  0426 06/21/25  1644 06/20/25  2208   SODIUM mmol/L 139 139  --  135   POTASSIUM mmol/L 3.5 4.0 3.4* 3.6   CHLORIDE mmol/L 98 102  --  101   CO2 mmol/L 28 25  --  23   ANION GAP mmol/L 13 12  --  11   BUN mg/dL 26* 17  --  17   CREATININE mg/dL 1.34* 0.83  --  0.84   EGFR ml/min/1.73sq m 41 73  --  72   CALCIUM mg/dL 9.3 9.5  --  9.5   MAGNESIUM mg/dL  --   --  2.2 2.0     Results from last 7 days   Lab Units 06/20/25  2208   AST U/L 19   ALT U/L 11   ALK PHOS U/L 52   TOTAL PROTEIN g/dL 7.3   ALBUMIN g/dL 4.4   TOTAL BILIRUBIN mg/dL 0.41     Results from last 7 days   Lab Units 06/23/25  0616 06/22/25  2104 06/22/25  1659 06/22/25  1040 06/22/25  0554 06/21/25  2129 06/21/25  1646 06/21/25  1029   POC GLUCOSE mg/dl 103 127 107 141* 111 118 118 117     Results from last 7 days   Lab Units 06/23/25  0325 06/22/25  0426 06/20/25  2208   GLUCOSE RANDOM mg/dL 107 105 123             Results from last 7 days   Lab Units 06/21/25  0234 06/21/25  0009  06/20/25  2208   HS TNI 0HR ng/L  --   --  6   HS TNI 2HR ng/L  --  5  --    HSTNI D2 ng/L  --  -1  --    HS TNI 4HR ng/L 4  --   --    HSTNI D4 ng/L -2  --   --          Results from last 7 days   Lab Units 06/20/25  2208   PROTIME seconds 14.4   INR  1.08   PTT seconds 30                         Results from last 7 days   Lab Units 06/20/25  2208   BNP pg/mL 45                 Results from last 7 days   Lab Units 06/20/25  2318   CLARITY UA  Slightly Cloudy   COLOR UA  Yellow   SPEC GRAV UA  1.020   PH UA  6.0   GLUCOSE UA mg/dl >=1000 (1%)*   KETONES UA mg/dl Negative   BLOOD UA  Negative   PROTEIN UA mg/dl Negative   NITRITE UA  Negative   BILIRUBIN UA  Negative   UROBILINOGEN UA E.U./dl 0.2   LEUKOCYTES UA  Elevated glucose may cause decreased leukocyte values. See urine microscopic for UWBC result*   WBC UA /hpf 2-4   RBC UA /hpf 1-2   BACTERIA UA /hpf Occasional   EPITHELIAL CELLS WET PREP /hpf Occasional     Results from last 7 days   Lab Units 06/21/25  1645   INFLUENZA A PCR  Negative   INFLUENZA B PCR  Negative   RSV PCR  Negative         Results from last 7 days   Lab Units 06/22/25  0300   GRAM STAIN RESULT  1+ Polys*  1+ Epithelial Cells*  1+ Gram negative rods*  1+ Gram positive cocci in pairs*       Past Medical History[1]  Present on Admission:   Atrial flutter, paroxysmal (HCC)   Acquired hypothyroidism   Acute on chronic systolic (congestive) heart failure (HCC)   Anemia   CAD (coronary artery disease)   Ischemic cardiomyopathy   Type 2 diabetes mellitus, without long-term current use of insulin (HCC)      Admitting Diagnosis: CHF exacerbation (HCC) [I50.9]  Age/Sex: 65 y.o. female    Network Utilization Review Department  ATTENTION: Please call with any questions or concerns to [phone] and carefully listen to the prompts so that you are directed to the right person. All voicemails are confidential.   For Discharge needs, contact Care Management DC Support Team at [phone] opt. [phone]  Send  all requests for admission clinical reviews, approved or denied determinations and any other requests to dedicated fax number below belonging to the campus where the patient is receiving treatment. List of dedicated fax numbers for the Facilities:  FACILITY NAME UR FAX NUMBER   ADMISSION DENIALS (Administrative/Medical Necessity) [phone]   DISCHARGE SUPPORT TEAM (NETWORK) [phone]   PARENT CHILD HEALTH (Maternity/NICU/Pediatrics) [phone]   [facility] [phone]   [facility] [phone]   [facility] [phone]   [facility] [phone]   [facility] [phone]   [facility] [phone]   [facility] [phone]   [facility] [phone]   [facility] [phone]   [facility] [phone]   [facility] [phone]   [facility] [place] [phone]              [1]   Past Medical History:  Diagnosis Date    Acute respiratory insufficiency 03/22/2024    Allergic     Anxiety 4/24    Arrhythmia 3/22/24    Atrial fibrillation (HCC) 3/22/24    Back pain 2022    Bradycardia 3/22/24    BRCA1 negative     BRCA2 negative     Breast cyst 2000    Chronic HFrEF (heart failure with reduced ejection fraction) (HCC)     Clotting disorder (HCC) 4/1/24    Coronary artery disease     COVID-19 virus infection 11/28/2022    Depression 4/24    Diabetes mellitus (HCC)     Disease of thyroid gland 4/09/2024    Environmental and seasonal allergies 1/1/1960    GERD (gastroesophageal reflux disease) 10/09    GI (gastrointestinal bleed) 4/1/24    Heart disease 3/24    History of placement of internal cardiac defibrillator 3/27/24    Hyperlipidemia     Hypoglycemia     ICD (implantable  cardioverter-defibrillator) in place     Ischemic cardiomyopathy     Lactic acidosis 03/22/2024    Migraine 1980    Morning headache 1980    Myocardial infarction ([facility]) 3/22/2024    Nausea & vomiting 04/03/2025    Otitis media 1966    Pacemaker 3/27/24    Peptic ulceration 4/2024    Seasonal allergies     Sinus problem 1970    ST elevation myocardial infarction involving left anterior descending (LAD) coronary artery ([facility]) 03/22/2024    Tobacco abuse     Visual impairment 1967

## 2025-06-23 NOTE — PROGRESS NOTES
Progress Note - Hospitalist   Name: Vesna Langston 65 y.o. female I MRN: [id number]  Unit/Bed#: [facility]PHP 526-01 I Date of Admission: 6/21/2025   Date of Service: 6/23/2025 I Hospital Day: 2     Assessment & Plan  Acute on chronic systolic (congestive) heart failure (HCC)  Wt Readings from Last 3 Encounters:   06/23/25 95.3 kg (210 lb)   06/20/25 99.7 kg (219 lb 12.8 oz)   06/16/25 96.6 kg (212 lb 15.4 oz)   Patient presented with complaints of worsening shortness of breath and chest tightness with associated weight gain despite taking Lasix 80 mg bid.   Patient transferred to \[facility]"" for heart failure evaluation  Most recent echocardiogram 10/2024 showed EF of 40-45% improved from prior of 30%  Home regimen diuretic is Lasix 40 mg daily as needed however has been using Lasix 80 mg twice daily for the last few days  Home regimen GDMT is Toprol-XL 50 mg daily, Entresto 49/51 mg twice daily, Jardiance 10 mg daily and Isordil 60 mg daily   Plan  Currently on Demadex 40 mg daily and had great urine output significantly better in terms of breathing and swelling.  Decreased entresto dose due to hypotension  Discussed with heart failure team want to monitor creatinine for 1 more day and possible discharge tomorrow continue current management  Awaiting echocardiogram as well.  Per cardiology likely discharge tomorrow no needs    CAD (coronary artery disease)  [facility] 3/2024 that s/p PCI to 100% ostial/proximal LAD stenosis  Continue Plavix, Eliquis, high intensity atorvastatin and beta-blocker  EKG with no acute ischemic changes.    Troponins negative  Atrial flutter, paroxysmal (HCC)  Heart rates are currently controlled  Continue dofetilide 125 mcg twice daily and Toprol XL 50 mg daily  Continue Eliquis 5 mg twice daily  URI (upper respiratory infection)  Patient reports cough, chest tightness and shortness of breath for multiple weeks.  She reports bringing up dark phlegm.  Symptoms could be related to her CHF exacerbation.  She received  multiple antibiotic courses recently for sinusitis and URI  COVID/flu/RSV.  Negative  Supportive care  Multiple chest x-rays and CT chest on  were negative for pneumonia.  Acquired hypothyroidism  Continue levothyroxine 75 mcg daily  Anemia  Iron studies consistent with BIANKA  Hemoglobin stable around mid 9-10s since May 2025  Continue iron supplements  Monitor CBC while inpatient  History of sustained ventricular tachycardia  Noted that felt to be scar mediated per reviewing EP notes.  S/p ICD.  Ischemic cardiomyopathy  s/p PCI to 100% ostial/proximal LAD stenosis 3/2024  GDMT as above.   Type 2 diabetes mellitus, without long-term current use of insulin ([facility])  Lab Results   Component Value Date    HGBA1C 5.9 (H) 2025       Recent Labs     25  1659 25  2104 25  0616 25  1034   POCGLU 107 127 103 133       Blood Sugar Average: Last 72 hrs:  (P) 119.75Well-controlled.    Continue with sliding scale insulin while inpatient.   Home regimen is weekly Ozempic.   Maintained on Jardiance 10 mg daily as part of GDMT.  Hypoglycemia protocol        VTE Pharmacologic Prophylaxis:   Eliquis    Mobility:   Basic Mobility Inpatient Raw Score: 24  JH-HLM Goal: 8: Walk 250 feet or more  JH-HLM Achieved: 8: Walk 250 feet ot more  JH-HLM Goal achieved. Continue to encourage appropriate mobility.    Patient Centered Rounds: I evaluated the patient without nursing staff present due to with another patient   Discussions with Specialists or Other Care Team Provider: Heart failure note reviewed    Education and Discussions with Family / Patient: Updated  (daughter) at bedside.    Current Length of Stay: 2 day(s)  Current Patient Status: Inpatient   Certification Statement: Monitoring  Discharge Plan: Anticipate discharge tomorrow to home.    Code Status: Level 1 - Full Code    Subjective:   Patient doing well plan to dc tomorrow    Objective:     Vitals:   Temp (24hrs), Av.8 °F (36.6 °C),  Min:97.3 °F (36.3 °C), Max:98.5 °F (36.9 °C)    Temp:  [97.3 °F (36.3 °C)-98.5 °F (36.9 °C)] 97.6 °F (36.4 °C)  HR:  [69-90] 74  Resp:  [16-19] 19  BP: ()/(40-65) 98/63  SpO2:  [94 %-99 %] 95 %  Body mass index is 31.93 kg/m².     Input and Output Summary (last 24 hours):     Intake/Output Summary (Last 24 hours) at 6/23/2025 1356  Last data filed at 6/23/2025 1315  Gross per 24 hour   Intake 1997 ml   Output 3095 ml   Net -1098 ml       Physical Exam  Vitals and nursing note reviewed.   Constitutional:       General: She is not in acute distress.     Appearance: She is well-developed.   HENT:      Head: Normocephalic and atraumatic.     Eyes:      Conjunctiva/sclera: Conjunctivae normal.       Cardiovascular:      Rate and Rhythm: Normal rate and regular rhythm.      Heart sounds: No murmur heard.  Pulmonary:      Effort: Pulmonary effort is normal. No respiratory distress.      Breath sounds: Normal breath sounds.   Abdominal:      Palpations: Abdomen is soft.      Tenderness: There is no abdominal tenderness.     Musculoskeletal:         General: No swelling.      Cervical back: Neck supple.     Skin:     General: Skin is warm and dry.      Capillary Refill: Capillary refill takes less than 2 seconds.     Neurological:      Mental Status: She is alert.     Psychiatric:         Mood and Affect: Mood normal.           Additional Data:     Labs:  Results from last 7 days   Lab Units 06/23/25  0325 06/22/25  0426 06/20/25  2208   WBC Thousand/uL 8.85   < > 9.18   HEMOGLOBIN g/dL 11.3*   < > 10.7*   HEMATOCRIT % 36.5   < > 35.3   PLATELETS Thousands/uL 323   < > 336   SEGS PCT %  --   --  57   LYMPHO PCT %  --   --  30   MONO PCT %  --   --  9   EOS PCT %  --   --  3    < > = values in this interval not displayed.     Results from last 7 days   Lab Units 06/23/25  0325 06/21/25  1644 06/20/25  2208   SODIUM mmol/L 139   < > 135   POTASSIUM mmol/L 3.5   < > 3.6   CHLORIDE mmol/L 98   < > 101   CO2 mmol/L 28   < >  23   BUN mg/dL 26*   < > 17   CREATININE mg/dL 1.34*   < > 0.84   ANION GAP mmol/L 13   < > 11   CALCIUM mg/dL 9.3   < > 9.5   ALBUMIN g/dL  --   --  4.4   TOTAL BILIRUBIN mg/dL  --   --  0.41   ALK PHOS U/L  --   --  52   ALT U/L  --   --  11   AST U/L  --   --  19   GLUCOSE RANDOM mg/dL 107   < > 123    < > = values in this interval not displayed.     Results from last 7 days   Lab Units 06/20/25  2208   INR  1.08     Results from last 7 days   Lab Units 06/23/25  1034 06/23/25  0616 06/22/25  2104 06/22/25  1659 06/22/25  1040 06/22/25  0554 06/21/25  2129 06/21/25  1646 06/21/25  1029   POC GLUCOSE mg/dl 133 103 127 107 141* 111 118 118 117               Lines/Drains:  Invasive Devices       Peripheral Intravenous Line  Duration             Peripheral IV 06/23/25 Distal;Right;Ventral (anterior) Forearm <1 day                      Telemetry:  Telemetry Orders (From admission, onward)               24 Hour Telemetry Monitoring  Continuous x 24 Hours (Telem)        Expiring   Question:  Reason for 24 Hour Telemetry  Answer:  Decompensated CHF- and any one of the following: continuous diuretic infusion or total diuretic dose >200 mg daily, associated electrolyte derangement (I.e. K < 3.0), inotropic drip (continuous infusion), hx of ventricular arrhythmia, or new EF < 35%                     Telemetry Reviewed: Normal Sinus Rhythm  Indication for Continued Telemetry Use: Acute CHF on >200 mg lasix/day or equivalent dose or with new reduced EF.              Imaging: Radiology Review: EKG was reviewed.     Recent Cultures (last 7 days):   Results from last 7 days   Lab Units 06/22/25  0300   SPUTUM CULTURE  Culture too young- will reincubate   GRAM STAIN RESULT  1+ Polys*  1+ Epithelial Cells*  1+ Gram negative rods*  1+ Gram positive cocci in pairs*       Last 24 Hours Medication List:   Current Facility-Administered Medications   Medication Dose Route Frequency Provider Last Rate    acetaminophen  650 mg Oral Q6H  PRN Philippe Parkinson MD      albuterol  2 puff Inhalation Q4H PRN Philippe Parkinson MD      apixaban  5 mg Oral BID Philippe Parkinson MD      atorvastatin  80 mg Oral QPM Philippe Parkinson MD      clopidogrel  75 mg Oral Daily Philippe Parkinson MD      dextromethorphan-guaiFENesin  10 mL Oral Q4H PRN Philippe Parkinson MD      dofetilide  125 mcg Oral Q12H MONA Philippe Parkinson MD      Empagliflozin  10 mg Oral QAM Philippe Parkinson MD      ergocalciferol  50,000 Units Oral Weekly Philippe Parkinson MD      ferrous gluconate  324 mg Oral Every Other Day Philippe Parkinson MD      fluticasone  1 spray Nasal Daily Philippe Parkinson MD      insulin lispro  1-5 Units Subcutaneous TID AC Philippe Parkinson MD      insulin lispro  1-5 Units Subcutaneous HS Philippe Parkinson MD      isosorbide mononitrate  60 mg Oral Daily Philippe Parkinson MD      levothyroxine  75 mcg Oral Early Morning Philippe Parkinson MD      metoprolol succinate  50 mg Oral HS Philippe Parkinson MD      multivitamin stress formula  1 tablet Oral Daily Philippe Parkinson MD      oxyCODONE  5 mg Oral Q6H PRN Philippe Parkinson MD      pantoprazole  40 mg Oral BID AC Philippe Parkinson MD      pregabalin  75 mg Oral TID Philippe Parkinson MD      sacubitril-valsartan  1 tablet Oral BID Chiara Worthington PA-C      [START ON 6/24/2025] torsemide  40 mg Oral Daily Chiara Worthington PA-C          Today, Patient Was Seen By: Jaylon Fuentes MD    **Please Note: This note may have been constructed using a voice recognition system.**

## 2025-06-23 NOTE — PROGRESS NOTES
Advanced Heart Failure / Pulmonary Hypertension Service Progress Note    Vesna Langston 65 y.o. female   MRN: [id number]  Unit/Bed#: [facility] 526-01; Encounter: [id number]    Assessment:  Principal Problem:    Acute on chronic systolic (congestive) heart failure (HCC)  Active Problems:    Atrial flutter, paroxysmal (HCC)    Ischemic cardiomyopathy    History of sustained ventricular tachycardia    CAD (coronary artery disease)    Type 2 diabetes mellitus, without long-term current use of insulin (HCC)    Acquired hypothyroidism    Anemia    URI (upper respiratory infection)    Patient is a 65-year-old female with PMH of ischemic cardiomyopathy, MI 3/2024 status post PCI to LAD, HFpEF 30 to 45%, VT, AFL who presented with shortness of breath and ongoing productive cough to outside hospital.  She was transferred to [facility] given concern for heart failure exacerbation and lack of cardiology coverage at OSH.    Subjective:   Patient seen and examined. Had some hypotension overnight, but denies any symptoms with this (was woken from sleep for BP checks). Feeling significantly better today; reports she did a couple laps earlier today with no issues.     Objective:   Intake/ Output: 1832/4020 (-2188)  Weight: 210 lbs  Telemetry: reviewed    Today's Plan:  Reportedly feeling much better after IV Lasix.  Switched to oral torsemide yesterday with brisk diuresis on oral diuretics.  Mild creatinine bump today. Reduce torsemide dose to 40 mg QD (already received 60 mg this AM).  Noted hypotension overnight.  Patient received her dose of Entresto this morning despite borderline low blood pressures (reported history of asymptomatic hypotension as an outpatient). Will reduce Entresto dose to 24-26 mg BID starting this evening.   Possible discharge tomorrow depending on hemodynamics and renal function on AM BMP.  Strict I/Os, daily standing weights.    Plan:  Acute on chronic HFimpEF; LVEF 30 to 35% > PCI and GDMT > LVEF 40 to 45%  "October 2024  Etiology: ischemic     [facility] 03/22/2024: received PCI to 100% stenosis of ostial/proximal LAD.  TTE 03/23/2024: LVEF 35%. LVIDd 4.7 cm. RWMA. Normal RV. Moderate MR. Mild TR. Dilated IVC.   cMRI 03/26/2024: LVEF 20%. LVIDd 4.5 cm. \"Transmural scarring of the mid anterior, anteroseptal and inferoseptal walls, the apical anterior, septal and inferior walls and the apex.\"  TTE 10/8/2024: LVEF 40 to 45%.  Abnormal diastolic function.  Hypokinetic segments.  RV systolic function normal.  LA mildly dilated.  Mild TR.      Pharmacotherapies / Neurohormonal Blockade:  --Beta Blocker: metoprolol succinate 50 mg qHS.   --ARNi / ACEi / ARB: Entresto 49-51 mg twice daily  --Aldosterone Antagonist: No.   --SGLT2 Inhibitor: Jardiance 10 mg daily    Volume management:  --Home diuretic: Lasix 40 mg PRN (had self increased up to Lasix 80 mg twice daily x 3 days at home without good effect)  --Diuretic: Torsemide 60 mg daily     Sudden Cardiac Death Risk Reduction:  --[employer]tronic dual chamber ICD in situ since 03/2024 (secondary prevention).   --Interrogation from 6/17/2025: AP: 9.4%. : <0.1% (MVP-ON). ALL AVAILABLE LEAD PARAMETERS WITHIN NORMAL LIMITS. NO SIGNIFICANT HIGH RATE EPISODES. OPTI-VOL WITHIN NORMAL LIMITS. PT RECENTLY TOOK LASIX DUE TO OPTI-VOL CROSSED, NOW WNL.      Electrical Resynchronization:  --Candidacy for BiV device: narrow QRS.      Advanced Therapies: Will continue to monitor.     Coronary artery disease              Without active chest pain.   S/p STEMI in 03/2024; received PCI to 100% stenosis of ostial/proximal LAD.              Continues on Plavix, statin, and BB as above.     History of monomorphic ventricular tachycardia              Per EP notes, felt to be scar mediated.               S/p secondary prevention ICD.                 Atrial flutter, paroxysmal               [id number] = 5 (age, sex, HF, CAD, DM).              Anticoagulation on Eliquis.               Rate control: BB as " "above.              Rhythm control: Was on amiodarone, transitioned to dofetilide 125 mcg twice daily 11/2024 during elective [facility] admit     History of PE 5/2024  Hyperlipidemia  Diabetes mellitus, type II  Chronic back pain with multiple prior surgeries  History of tobacco abuse    Vitals:   Blood pressure 98/62, pulse 82, temperature 97.6 °F (36.4 °C), temperature source Oral, resp. rate 18, height 5' 8\" (1.727 m), weight 95.3 kg (210 lb), SpO2 95%.    Body mass index is 31.93 kg/m².    I/O last 3 completed shifts:  In: 2907 [P.O.:2907]  Out: 5720 [Urine:5720]    Wt Readings from Last 10 Encounters:   06/23/25 95.3 kg (210 lb)   06/20/25 99.7 kg (219 lb 12.8 oz)   06/16/25 96.6 kg (212 lb 15.4 oz)   06/14/25 96.6 kg (213 lb)   06/10/25 96.7 kg (213 lb 1.6 oz)   06/05/25 85.3 kg (188 lb)   06/04/25 85.5 kg (188 lb 7.9 oz)   06/02/25 95.2 kg (209 lb 14.1 oz)   05/30/25 94.3 kg (208 lb)   05/23/25 98.3 kg (216 lb 11.4 oz)     Vitals:    06/23/25 0825 06/23/25 1031 06/23/25 1216 06/23/25 1500   BP: 102/52 98/63 98/63 98/62   BP Location:    Left arm   Pulse: 70 74 74 82   Resp:  19  18   Temp:  97.6 °F (36.4 °C)  97.6 °F (36.4 °C)   TempSrc:    Oral   SpO2:  95%  95%   Weight:   95.3 kg (210 lb)    Height:   5' 8\" (1.727 m)        Physical Exam  Constitutional:       Appearance: Normal appearance.   HENT:      Head: Normocephalic.      Nose: Nose normal.      Mouth/Throat:      Mouth: Mucous membranes are moist.     Eyes:      Conjunctiva/sclera: Conjunctivae normal.     Neck:      Vascular: No JVD.     Cardiovascular:      Rate and Rhythm: Normal rate and regular rhythm.      Comments: Trace edema BLLE   Pulmonary:      Effort: Pulmonary effort is normal.      Breath sounds: Normal breath sounds.     Musculoskeletal:      Cervical back: Neck supple.     Skin:     General: Skin is warm and dry.     Neurological:      General: No focal deficit present.      Mental Status: She is alert and oriented to person, place, " and time.     Psychiatric:         Mood and Affect: Mood normal.         Behavior: Behavior normal.         Current Medications[1]    Labs & Results:      Results from last 7 days   Lab Units 06/23/25  0325 06/22/25  0426 06/20/25  2208   WBC Thousand/uL 8.85 8.14 9.18   HEMOGLOBIN g/dL 11.3* 11.9 10.7*   HEMATOCRIT % 36.5 41.2 35.3   PLATELETS Thousands/uL 323 299 336         Results from last 7 days   Lab Units 06/23/25  0325 06/22/25  0426 06/21/25  1644 06/20/25  2208   POTASSIUM mmol/L 3.5 4.0 3.4* 3.6   CHLORIDE mmol/L 98 102  --  101   CO2 mmol/L 28 25  --  23   BUN mg/dL 26* 17  --  17   CREATININE mg/dL 1.34* 0.83  --  0.84   CALCIUM mg/dL 9.3 9.5  --  9.5   ALK PHOS U/L  --   --   --  52   ALT U/L  --   --   --  11   AST U/L  --   --   --  19     Results from last 7 days   Lab Units 06/20/25  2208   INR  1.08         Thank you for the opportunity to participate in the care of this patient.    Chiara Worthington PA-C  Advanced Heart Failure  [facility]         [1]   Current Facility-Administered Medications:     acetaminophen (TYLENOL) tablet 650 mg, 650 mg, Oral, Q6H PRN, Philippe Parkinson MD, 650 mg at 06/22/25 2002    albuterol (PROVENTIL HFA,VENTOLIN HFA) inhaler 2 puff, 2 puff, Inhalation, Q4H PRN, Philippe Parkinson MD    apixaban (ELIQUIS) tablet 5 mg, 5 mg, Oral, BID, Philippe Parkinson MD, 5 mg at 06/23/25 0824    atorvastatin (LIPITOR) tablet 80 mg, 80 mg, Oral, QPM, Philippe Parkinson MD, 80 mg at 06/22/25 1712    clopidogrel (PLAVIX) tablet 75 mg, 75 mg, Oral, Daily, Philippe Parkinson MD, 75 mg at 06/23/25 0824    dextromethorphan-guaiFENesin (ROBITUSSIN DM) oral syrup 10 mL, 10 mL, Oral, Q4H PRN, Philippe Parkinson MD    dofetilide (TIKOSYN) capsule 125 mcg, 125 mcg, Oral, Q12H MONA, Philippe Parkinson MD, 125 mcg at 06/23/25 0643    Empagliflozin (JARDIANCE) tablet 10 mg, 10 mg, Oral, QAM, Philippe Parkinson MD, 10 mg at 06/23/25 0824    ergocalciferol (VITAMIN D2) capsule 50,000 Units, 50,000 Units, Oral,  Weekly, Philippe Parkinson MD, 50,000 Units at 06/23/25 0824    ferrous gluconate (FERGON) tablet 324 mg, 324 mg, Oral, Every Other Day, Philippe Parkinson MD, 324 mg at 06/23/25 1158    fluticasone (FLONASE) 50 mcg/act nasal spray 1 spray, 1 spray, Nasal, Daily, Philippe Parkinson MD, 1 spray at 06/23/25 0824    insulin lispro (HumALOG/ADMELOG) 100 units/mL subcutaneous injection 1-5 Units, 1-5 Units, Subcutaneous, TID AC **AND** Fingerstick Glucose (POCT), , , TID AC, Philippe Parkinson MD    insulin lispro (HumALOG/ADMELOG) 100 units/mL subcutaneous injection 1-5 Units, 1-5 Units, Subcutaneous, HS, Philippe Parkinson MD    isosorbide mononitrate (IMDUR) 24 hr tablet 60 mg, 60 mg, Oral, Daily, Philippe Parkinson MD, 60 mg at 06/22/25 0915    levothyroxine tablet 75 mcg, 75 mcg, Oral, Early Morning, Philippe Parkinson MD, 75 mcg at 06/23/25 0643    metoprolol succinate (TOPROL-XL) 24 hr tablet 50 mg, 50 mg, Oral, HS, Philippe Parkinson MD, 50 mg at 06/21/25 2126    multivitamin stress formula tablet 1 tablet, 1 tablet, Oral, Daily, Philippe Parkinson MD, 1 tablet at 06/23/25 0825    oxyCODONE (ROXICODONE) IR tablet 5 mg, 5 mg, Oral, Q6H PRN, Philippe Parkinson MD    pantoprazole (PROTONIX) EC tablet 40 mg, 40 mg, Oral, BID AC, Philippe Parkinson MD, 40 mg at 06/23/25 0643    pregabalin (LYRICA) capsule 75 mg, 75 mg, Oral, TID, Philippe Parkinson MD, 75 mg at 06/23/25 0825    sacubitril-valsartan (ENTRESTO) 24-26 MG per tablet 1 tablet, 1 tablet, Oral, BID, Chiara Worthington PA-C    [START ON 6/24/2025] torsemide (DEMADEX) tablet 40 mg, 40 mg, Oral, Daily, hCiara Worthington PA-C

## 2025-06-23 NOTE — APP STUDENT NOTE
CAMMIE STUDENT  Inpatient Progress Note for TRAINING ONLY  Not Part of Legal Medical Record     Progress Note - Vesna Langston 65 y.o. female MRN: [id number]  Unit/Bed#: [facility] 526-01 Encounter: [id number]    Assessment:    Principal Problem:    Acute on chronic systolic (congestive) heart failure (HCC)  Active Problems:    Atrial flutter, paroxysmal (HCC)    Ischemic cardiomyopathy    History of sustained ventricular tachycardia    CAD (coronary artery disease)    Type 2 diabetes mellitus, without long-term current use of insulin ([facility])    Acquired hypothyroidism    Anemia    URI (upper respiratory infection)      Plan:  Acute on chronic systolic (congestive) heart failure (HCC)  65 y.o. female with a PMH of CAD,ICM, heart failure with ejection fraction, VT s/p ICD, Atrial flutter on Eliquis, hyperlipidemia, type 2 diabetes mellitus, chronic back pain and hypothyroidism who presented initially to [facility] with weight gain, worsening shortness of breath and chest tightness with concerns for CHF exacerbation. Transferred to [facility] for further evaluation.       Wt Readings from Last 3 Encounters:   06/23/25 95.3 kg (210 lb 3.2 oz)   06/20/25 99.7 kg (219 lb 12.8 oz)      06/16/25 96.6 kg (212 lb 15.4 oz)      Patient presented with complaints of worsening shortness of breath and chest tightness with associated weight gain despite taking Lasix 80 mg bid.   Most recent echocardiogram 10/2024 showed EF of 40-45% improved from prior of 30%  Repeat echo pending  Home regimen diuretic is Lasix 40 mg daily as needed   Home regimen GDMT is Toprol-XL 50 mg daily, Entresto 49/51 mg twice daily, Jardiance 10 mg daily and Isordil 60 mg daily which will be continued as BP allows  Discontinue IV lasix  Start Torsemide 60mg PO   Monitor I/O's, daily weights and monitor for electrolyte disturbances and nephrotoxicity while on diuretics.  Heart failure consultation obtained  Continue monitoring on telemetry.       CAD (coronary artery  disease)  [facility] 3/2024 that s/p PCI to 100% ostial/proximal LAD stenosis  Continue Plavix, Eliquis, high intensity atorvastatin and beta-blocker  EKG with no acute ischemic changes.  Troponins negative  Atrial flutter, paroxysmal (HCC)  Rates are currently controlled  Continue dofetilide 125 mcg twice daily and Toprol XL 50 mg daily  Continue Eliquis 5 mg twice daily  URI (upper respiratory infection)  Patient reports cough, chest tightness and shortness of breath for multiple weeks.  She reports bringing up dark phlegm.  Symptoms could be related to her CHF exacerbation.  She received multiple antibiotic courses recently for sinusitis and URI  Negative COVID/flu/RSV/Pertussis  Supportive care  Multiple chest x-rays and CT chest on June were negative for pneumonia.  Acquired hypothyroidism  Continue levothyroxine 75 mcg daily  Anemia  Iron studies consistent with BIANKA  Hemoglobin stable around mid 9-10s since May 2025  Continue iron supplements  Monitor CBC  History of sustained ventricular tachycardia  Felt to be scar mediated per reviewing EP notes.  S/p ICD.  Ischemic cardiomyopathy  s/p PCI to 100% ostial/proximal LAD stenosis 3/2024  GDMT as above.  Type 2 diabetes mellitus, without long-term current use of insulin (HCC)        Lab Results   Component Value Date     HGBA1C 5.9 (H) 03/28/2025                Recent Labs        06/22/25  1040 06/22/25  1659 06/22/25  2104 06/23/25  0616   POCGLU 141 (H) 107 127 103         Blood Sugar Average: Last 72 hrs:  Well-controlled.  Monitor with sliding scale insulin while inpatient.   Home regimen is weekly Ozempic.   Maintained on Jardiance 10 mg daily as part of GDMT.  Hypoglycemia protocol        VTE Pharmacologic Prophylaxis:   Moderate Risk (Score 3-4) - Pharmacological DVT Prophylaxis Ordered: apixaban (Eliquis).  Code Status: Prior         Anticipated Length of Stay: Patient will be admitted on an inpatient basis with an anticipated length of stay of greater than 2  midnights secondary to CHF.      VTE Pharmacologic Prophylaxis:   Pharmacologic: Apixaban (Eliquis)  Mechanical VTE Prophylaxis in Place: No    Patient Centered Rounds: {Patient Centered Rounds:00787}    Discussions with Specialists or Other Care Team Provider: ***    Education and Discussions with Family / Patient: ***    Time Spent for Care: {Time; Time 15 min - 1 hour:47489}.  More than 50% of total time spent on counseling and coordination of care as described above.    Current Length of Stay: 2 day(s)    Current Patient Status: Inpatient   Certification Statement: {Certification Statement:79714}    Discharge Plan: ***    Code Status: Level 1 - Full Code    Subjective:   Pt reports significant improvement in SOB, says she still has SOB but barely notices it and attributes it to her persistent cough. Denies chest pain, palpitations.     Objective:     Vitals:   Temp (24hrs), Av.9 °F (36.6 °C), Min:97.3 °F (36.3 °C), Max:98.5 °F (36.9 °C)    Temp:  [97.3 °F (36.3 °C)-98.5 °F (36.9 °C)] 97.3 °F (36.3 °C)  HR:  [69-90] 70  Resp:  [16] 16  BP: (71-99)/(40-65) 94/57  SpO2:  [94 %-99 %] 98 %  Body mass index is 31.96 kg/m².     Input and Output Summary (last 24 hours):       Intake/Output Summary (Last 24 hours) at 2025 0929  Last data filed at 2025 0659  Gross per 24 hour   Intake 1652 ml   Output 3620 ml   Net -1968 ml       Physical Exam:     Physical Exam  Constitutional:       General: She is not in acute distress.     Appearance: She is obese. She is not ill-appearing, toxic-appearing or diaphoretic.   HENT:      Head: Normocephalic and atraumatic.      Nose: Nose normal.     Eyes:      Conjunctiva/sclera: Conjunctivae normal.       Cardiovascular:      Rate and Rhythm: Normal rate and regular rhythm.      Pulses: Normal pulses.      Heart sounds: Normal heart sounds. No murmur heard.     No gallop.   Pulmonary:      Effort: Pulmonary effort is normal. No respiratory distress.      Breath sounds:  Normal breath sounds. No wheezing, rhonchi or rales.      Comments: Cough  Abdominal:      General: Abdomen is flat. There is no distension.      Palpations: Abdomen is soft.      Tenderness: There is no abdominal tenderness. There is no guarding or rebound.     Musculoskeletal:      Right lower leg: Edema present.      Left lower leg: Edema present.      Comments: Slight LE edema      Skin:     General: Skin is warm and dry.      Capillary Refill: Capillary refill takes less than 2 seconds.      Coloration: Skin is not pale.     Neurological:      General: No focal deficit present.      Mental Status: She is alert.           Historical Information   Past Medical History[1]  Past Surgical History[2]  Social History   Social History     Substance and Sexual Activity   Alcohol Use Not Currently    Alcohol/week: 1.0 standard drink of alcohol    Types: 1 Shots of liquor per week    Comment: Every 2or 3months     Social History     Substance and Sexual Activity   Drug Use Never     Tobacco Use History[3]  Family History: {[facility] FAM HISTORY SMARTLIST:275395314}    Meds/Allergies   {[facility] H&P MEDS:394970468}  Allergies[4]    Additional Data:     Labs:    Results from last 7 days   Lab Units 06/23/25  0325 06/22/25  0426 06/20/25  2208   WBC Thousand/uL 8.85   < > 9.18   HEMOGLOBIN g/dL 11.3*   < > 10.7*   HEMATOCRIT % 36.5   < > 35.3   PLATELETS Thousands/uL 323   < > 336   SEGS PCT %  --   --  57   LYMPHO PCT %  --   --  30   MONO PCT %  --   --  9   EOS PCT %  --   --  3    < > = values in this interval not displayed.     Results from last 7 days   Lab Units 06/23/25  0325 06/21/25  1644 06/20/25  2208   SODIUM mmol/L 139   < > 135   POTASSIUM mmol/L 3.5   < > 3.6   CHLORIDE mmol/L 98   < > 101   CO2 mmol/L 28   < > 23   BUN mg/dL 26*   < > 17   CREATININE mg/dL 1.34*   < > 0.84   ANION GAP mmol/L 13   < > 11   CALCIUM mg/dL 9.3   < > 9.5   ALBUMIN g/dL  --   --  4.4   TOTAL BILIRUBIN mg/dL  --   --  0.41   ALK PHOS U/L   --   --  52   ALT U/L  --   --  11   AST U/L  --   --  19   GLUCOSE RANDOM mg/dL 107   < > 123    < > = values in this interval not displayed.     Results from last 7 days   Lab Units 06/20/25  2208   INR  1.08     Results from last 7 days   Lab Units 06/23/25  0616 06/22/25  2104 06/22/25  1659 06/22/25  1040 06/22/25  0554 06/21/25  2129 06/21/25  1646 06/21/25  1029   POC GLUCOSE mg/dl 103 127 107 141* 111 118 118 117                 * I Have Reviewed All Lab Data Listed Above.  * Additional Pertinent Lab Tests Reviewed: {Labreview:27205}    Imaging:    Imaging Reports Reviewed Today Include: ***  Imaging Personally Reviewed by Myself Includes:  ***    Recent Cultures (last 7 days):     Results from last 7 days   Lab Units 06/22/25  0300   GRAM STAIN RESULT  1+ Polys*  1+ Epithelial Cells*  1+ Gram negative rods*  1+ Gram positive cocci in pairs*       Last 24 Hours Medication List:   Current Facility-Administered Medications   Medication Dose Route Frequency Provider Last Rate    acetaminophen  650 mg Oral Q6H PRN Philippe Parkinson MD      albuterol  2 puff Inhalation Q4H PRN Philippe Parkinson MD      apixaban  5 mg Oral BID Philippe Parkinson MD      atorvastatin  80 mg Oral QPM Philippe Parkinson MD      clopidogrel  75 mg Oral Daily Philippe Parkinson MD      dextromethorphan-guaiFENesin  10 mL Oral Q4H PRN Philippe Parkinson MD      dofetilide  125 mcg Oral Q12H MONA Philippe Parkinson MD      Empagliflozin  10 mg Oral QAM Philippe Parkinson MD      ergocalciferol  50,000 Units Oral Weekly Philippe Parkinson MD      ferrous gluconate  324 mg Oral Every Other Day Philippe Parkinson MD      fluticasone  1 spray Nasal Daily Philippe Parkinson MD      insulin lispro  1-5 Units Subcutaneous TID AC Philippe Parkinson MD      insulin lispro  1-5 Units Subcutaneous HS Philippe Parkinson MD      isosorbide mononitrate  60 mg Oral Daily Philippe Parkinson MD      levothyroxine  75 mcg Oral Early Morning Philippe Parkinson MD      metoprolol succinate  50 mg Oral HS Philippe Parkinson MD       multivitamin stress formula  1 tablet Oral Daily Philippe Parkinson MD      oxyCODONE  5 mg Oral Q6H PRN Philippe Parkinson MD      pantoprazole  40 mg Oral BID AC Philippe Parkinson MD      pregabalin  75 mg Oral TID Philippe Parkinson MD      sacubitril-valsartan  1 tablet Oral BID Philippe Parkinson MD      torsemide  60 mg Oral Daily Vitor Bell MD          Today, Patient Was Seen By: Gianni Ramos    ** Please Note: Dictation voice to text software may have been used in the creation of this document. **       [1]   Past Medical History:  Diagnosis Date    Acute respiratory insufficiency 03/22/2024    Allergic     Anxiety 4/24    Arrhythmia 3/22/24    Atrial fibrillation (HCC) 3/22/24    Back pain 2022    Bradycardia 3/22/24    BRCA1 negative     BRCA2 negative     Breast cyst 2000    Chronic HFrEF (heart failure with reduced ejection fraction) ([facility])     Clotting disorder ([facility]) 4/1/24    Coronary artery disease     COVID-19 virus infection 11/28/2022    Depression 4/24    Diabetes mellitus ([facility])     Disease of thyroid gland 4/09/2024    Environmental and seasonal allergies 1/1/1960    GERD (gastroesophageal reflux disease) 10/09    GI (gastrointestinal bleed) 4/1/24    Heart disease 3/24    History of placement of internal cardiac defibrillator 3/27/24    Hyperlipidemia     Hypoglycemia     ICD (implantable cardioverter-defibrillator) in place     Ischemic cardiomyopathy     Lactic acidosis 03/22/2024    Migraine 1980    Morning headache 1980    Myocardial infarction ([facility]) 3/22/2024    Nausea & vomiting 04/03/2025    Otitis media 1966    Pacemaker 3/27/24    Peptic ulceration 4/2024    Seasonal allergies     Sinus problem 1970    ST elevation myocardial infarction involving left anterior descending (LAD) coronary artery ([facility]) 03/22/2024    Tobacco abuse     Visual impairment 1967   [2]   Past Surgical History:  Procedure Laterality Date    ADENOIDECTOMY      APPENDECTOMY      APPENDECTOMY LAPAROSCOPIC N/A 05/08/2024    Procedure:  APPENDECTOMY LAPAROSCOPIC;  Surgeon: Lauryn Ullrich, DO;  Location: [facility];  Service: General    BACK SURGERY      BACK SURGERY      BREAST EXCISIONAL BIOPSY Right 2000    cyst removed- benign    CARDIAC CATHETERIZATION N/A 2024    Procedure: Cardiac pci;  Surgeon: Gabriel Myers MD;  [facility]: [facility];  Service: Cardiology    CARDIAC CATHETERIZATION N/A 2024    Procedure: Cardiac Coronary Angiogram;  Surgeon: Gabriel Myers MD;  [facility];  Service: Cardiology    CARDIAC CATHETERIZATION N/A 2024    Procedure: Cardiac PCI AMI;  Surgeon: Gabriel Myers MD;  [facility];  Service: Cardiology    CARDIAC CATHETERIZATION N/A 2024    Procedure: Cardiac IVUS;  Surgeon: Gabriel Myers MD;  [facility];  Service: Cardiology    CARDIAC DEFIBRILLATOR PLACEMENT      CARDIAC ELECTROPHYSIOLOGY PROCEDURE N/A 2024    Procedure: Cardiac icd implant;  Surgeon: Jeffy Lama MD;  [facility];  Service: Cardiology     SECTION      COLONOSCOPY      ECTOPIC PREGNANCY SURGERY      INSERT / REPLACE / REMOVE PACEMAKER      LUMBAR FUSION Bilateral 2025    Procedure: Navigated revision L3-S1 laminectomy/decompression, revision L3-S1 posterior instrumented spinal fusion with TLIF;  Surgeon: Charles Mcrae MD;  Location: [facility];  Service: Orthopedics    MASS EXCISION Left 10/18/2024    Procedure: EXCISION BIOPSY TISSUE LESION/MASS UPPER EXTREMITY - Left index finger;  Surgeon: Leandro Campos MD;  Location: [facility];  Service: Orthopedics    SEPTOPLASTY      SPINE SURGERY  23    TONSILLECTOMY      TONSILLECTOMY AND ADENOIDECTOMY  1964    TRIGGER POINT INJECTION      UPPER GASTROINTESTINAL ENDOSCOPY  24   [3]   Social History  Tobacco Use   Smoking Status Former    Current packs/day: 0.00    Average packs/day: 1 pack/day for 24.2 years (24.2 ttl pk-yrs)    Types: Cigarettes    Start date: 2000     Quit date: 3/22/2024    Years since quittin.2    Passive exposure: Never   Smokeless Tobacco Never   Tobacco Comments    Smoked 0.5-1 ppd; quit in 2024.    [4]   Allergies  Allergen Reactions    Fish-Derived Products - Food Allergy Anaphylaxis     Cannot have all seafood products    Shellfish-Derived Products - Food Allergy Anaphylaxis    Azithromycin Hives and Rash

## 2025-06-23 NOTE — PROGRESS NOTES
Patient listed on Advanced Heart Failure Census at [facility].     Outpatient Advanced Heart Failure LCSW completed electronic chart review and rounded with the Heart Failure Team.   Dr. Aguilar examined patient and discussed Today's Plan. Pt stated that she may reside with family x 1 week post discharge for extra assistance at home and the Inpt CM is assisting patient with transportation to return home.     Outpatient Social Work needs have not been identified at this time. Please enter referral if needed in the future.

## 2025-06-23 NOTE — PLAN OF CARE
Problem: PAIN - ADULT  Goal: Verbalizes/displays adequate comfort level or baseline comfort level  Description: Interventions:  - Encourage patient to monitor pain and request assistance  - Assess pain using appropriate pain scale  - Administer analgesics as ordered based on type and severity of pain and evaluate response  - Implement non-pharmacological measures as appropriate and evaluate response  - Consider cultural and social influences on pain and pain management  - Notify physician/advanced practitioner if interventions unsuccessful or patient reports new pain  - Educate patient/family on pain management process including their role and importance of  reporting pain   - Provide non-pharmacologic/complimentary pain relief interventions  Outcome: Progressing     Problem: INFECTION - ADULT  Goal: Absence or prevention of progression during hospitalization  Description: INTERVENTIONS:  - Assess and monitor for signs and symptoms of infection  - Monitor lab/diagnostic results  - Monitor all insertion sites, i.e. indwelling lines, tubes, and drains  - Monitor endotracheal if appropriate and nasal secretions for changes in amount and color  - Carrier appropriate cooling/warming therapies per order  - Administer medications as ordered  - Instruct and encourage patient and family to use good hand hygiene technique  - Identify and instruct in appropriate isolation precautions for identified infection/condition  Outcome: Progressing  Goal: Absence of fever/infection during neutropenic period  Description: INTERVENTIONS:  - Monitor WBC  - Perform strict hand hygiene  - Limit to healthy visitors only  - No plants, dried, fresh or silk flowers with castillo in patient room  Outcome: Progressing     Problem: SAFETY ADULT  Goal: Patient will remain free of falls  Description: INTERVENTIONS:  - Educate patient/family on patient safety including physical limitations  - Instruct patient to call for assistance with activity   -  Consider consulting OT/PT to assist with strengthening/mobility based on AM PAC & JH-HLM score  - Consult OT/PT to assist with strengthening/mobility   - Keep Call bell within reach  - Keep bed low and locked with side rails adjusted as appropriate  - Keep care items and personal belongings within reach  - Initiate and maintain comfort rounds  - Make Fall Risk Sign visible to staff  - Offer Toileting every  Hours, in advance of need  - Initiate/Maintain alarm  - Obtain necessary fall risk management equipment:   - Apply yellow socks and bracelet for high fall risk patients  - Consider moving patient to room near nurses station  Outcome: Progressing  Goal: Maintain or return to baseline ADL function  Description: INTERVENTIONS:  -  Assess patient's ability to carry out ADLs; assess patient's baseline for ADL function and identify physical deficits which impact ability to perform ADLs (bathing, care of mouth/teeth, toileting, grooming, dressing, etc.)  - Assess/evaluate cause of self-care deficits   - Assess range of motion  - Assess patient's mobility; develop plan if impaired  - Assess patient's need for assistive devices and provide as appropriate  - Encourage maximum independence but intervene and supervise when necessary  - Involve family in performance of ADLs  - Assess for home care needs following discharge   - Consider OT consult to assist with ADL evaluation and planning for discharge  - Provide patient education as appropriate  - Monitor functional capacity and physical performance, use of AM PAC & JH-HLM   - Monitor gait, balance and fatigue with ambulation    Outcome: Progressing  Goal: Maintains/Returns to pre admission functional level  Description: INTERVENTIONS:  - Perform AM-PAC 6 Click Basic Mobility/ Daily Activity assessment daily.  - Set and communicate daily mobility goal to care team and patient/family/caregiver.   - Collaborate with rehabilitation services on mobility goals if consulted  -  Perform Range of Motion  times a day.  - Reposition patient every  hours.  - Dangle patient  times a day  - Stand patient  times a day  - Ambulate patient  times a day  - Out of bed to chair  times a day   - Out of bed for meals  times a day  - Out of bed for toileting  - Record patient progress and toleration of activity level   Outcome: Progressing     Problem: DISCHARGE PLANNING  Goal: Discharge to home or other facility with appropriate resources  Description: INTERVENTIONS:  - Identify barriers to discharge w/patient and caregiver  - Arrange for needed discharge resources and transportation as appropriate  - Identify discharge learning needs (meds, wound care, etc.)  - Arrange for interpretive services to assist at discharge as needed  - Refer to Case Management Department for coordinating discharge planning if the patient needs post-hospital services based on physician/advanced practitioner order or complex needs related to functional status, cognitive ability, or social support system  Outcome: Progressing     Problem: Knowledge Deficit  Goal: Patient/family/caregiver demonstrates understanding of disease process, treatment plan, medications, and discharge instructions  Description: Complete learning assessment and assess knowledge base.  Interventions:  - Provide teaching at level of understanding  - Provide teaching via preferred learning methods  Outcome: Progressing     Problem: CARDIOVASCULAR - ADULT  Goal: Maintains optimal cardiac output and hemodynamic stability  Description: INTERVENTIONS:  - Monitor I/O, vital signs and rhythm  - Monitor for S/S and trends of decreased cardiac output  - Administer and titrate ordered vasoactive medications to optimize hemodynamic stability  - Assess quality of pulses, skin color and temperature  - Assess for signs of decreased coronary artery perfusion  - Instruct patient to report change in severity of symptoms  Outcome: Progressing  Goal: Absence of cardiac  dysrhythmias or at baseline rhythm  Description: INTERVENTIONS:  - Continuous cardiac monitoring, vital signs, obtain 12 lead EKG if ordered  - Administer antiarrhythmic and heart rate control medications as ordered  - Monitor electrolytes and administer replacement therapy as ordered  Outcome: Progressing

## 2025-06-24 LAB
ANION GAP SERPL CALCULATED.3IONS-SCNC: 12 MMOL/L (ref 4–13)
BACTERIA SPT RESP CULT: ABNORMAL
BUN SERPL-MCNC: 28 MG/DL (ref 5–25)
CALCIUM SERPL-MCNC: 9.5 MG/DL (ref 8.4–10.2)
CHLORIDE SERPL-SCNC: 98 MMOL/L (ref 96–108)
CO2 SERPL-SCNC: 29 MMOL/L (ref 21–32)
CREAT SERPL-MCNC: 1.11 MG/DL (ref 0.6–1.3)
ERYTHROCYTE [DISTWIDTH] IN BLOOD BY AUTOMATED COUNT: 17 % (ref 11.6–15.1)
GFR SERPL CREATININE-BSD FRML MDRD: 51 ML/MIN/1.73SQ M
GLUCOSE SERPL-MCNC: 116 MG/DL (ref 65–140)
GLUCOSE SERPL-MCNC: 119 MG/DL (ref 65–140)
GLUCOSE SERPL-MCNC: 125 MG/DL (ref 65–140)
GLUCOSE SERPL-MCNC: 153 MG/DL (ref 65–140)
GLUCOSE SERPL-MCNC: 97 MG/DL (ref 65–140)
GRAM STN SPEC: ABNORMAL
HCT VFR BLD AUTO: 36.5 % (ref 34.8–46.1)
HGB BLD-MCNC: 11.3 G/DL (ref 11.5–15.4)
MAGNESIUM SERPL-MCNC: 2.1 MG/DL (ref 1.9–2.7)
MCH RBC QN AUTO: 24.5 PG (ref 26.8–34.3)
MCHC RBC AUTO-ENTMCNC: 31 G/DL (ref 31.4–37.4)
MCV RBC AUTO: 79 FL (ref 82–98)
PLATELET # BLD AUTO: 312 THOUSANDS/UL (ref 149–390)
PMV BLD AUTO: 9.9 FL (ref 8.9–12.7)
POTASSIUM SERPL-SCNC: 3.2 MMOL/L (ref 3.5–5.3)
RBC # BLD AUTO: 4.62 MILLION/UL (ref 3.81–5.12)
SODIUM SERPL-SCNC: 139 MMOL/L (ref 135–147)
WBC # BLD AUTO: 7.98 THOUSAND/UL (ref 4.31–10.16)

## 2025-06-24 PROCEDURE — 83735 ASSAY OF MAGNESIUM: CPT

## 2025-06-24 PROCEDURE — 94668 MNPJ CHEST WALL SBSQ: CPT

## 2025-06-24 PROCEDURE — 99233 SBSQ HOSP IP/OBS HIGH 50: CPT | Performed by: INTERNAL MEDICINE

## 2025-06-24 PROCEDURE — 80048 BASIC METABOLIC PNL TOTAL CA: CPT

## 2025-06-24 PROCEDURE — 99232 SBSQ HOSP IP/OBS MODERATE 35: CPT

## 2025-06-24 PROCEDURE — 85027 COMPLETE CBC AUTOMATED: CPT

## 2025-06-24 PROCEDURE — 82948 REAGENT STRIP/BLOOD GLUCOSE: CPT

## 2025-06-24 RX ORDER — POTASSIUM CHLORIDE 1500 MG/1
40 TABLET, EXTENDED RELEASE ORAL 2 TIMES DAILY
Status: COMPLETED | OUTPATIENT
Start: 2025-06-24 | End: 2025-06-24

## 2025-06-24 RX ORDER — TORSEMIDE 20 MG/1
20 TABLET ORAL DAILY
Status: DISCONTINUED | OUTPATIENT
Start: 2025-06-25 | End: 2025-06-25 | Stop reason: HOSPADM

## 2025-06-24 RX ADMIN — ACETAMINOPHEN 650 MG: 325 TABLET ORAL at 09:17

## 2025-06-24 RX ADMIN — DOFETILIDE 125 MCG: 0.12 CAPSULE ORAL at 20:05

## 2025-06-24 RX ADMIN — APIXABAN 5 MG: 5 TABLET, FILM COATED ORAL at 17:03

## 2025-06-24 RX ADMIN — PANTOPRAZOLE SODIUM 40 MG: 40 TABLET, DELAYED RELEASE ORAL at 17:03

## 2025-06-24 RX ADMIN — APIXABAN 5 MG: 5 TABLET, FILM COATED ORAL at 08:25

## 2025-06-24 RX ADMIN — PREGABALIN 75 MG: 75 CAPSULE ORAL at 17:03

## 2025-06-24 RX ADMIN — ATORVASTATIN CALCIUM 80 MG: 80 TABLET, FILM COATED ORAL at 17:03

## 2025-06-24 RX ADMIN — PREGABALIN 75 MG: 75 CAPSULE ORAL at 22:14

## 2025-06-24 RX ADMIN — POTASSIUM CHLORIDE 40 MEQ: 1500 TABLET, EXTENDED RELEASE ORAL at 11:18

## 2025-06-24 RX ADMIN — CLOPIDOGREL BISULFATE 75 MG: 75 TABLET, FILM COATED ORAL at 08:24

## 2025-06-24 RX ADMIN — PREGABALIN 75 MG: 75 CAPSULE ORAL at 08:25

## 2025-06-24 RX ADMIN — B-COMPLEX W/ C & FOLIC ACID TAB 1 TABLET: TAB at 08:24

## 2025-06-24 RX ADMIN — PANTOPRAZOLE SODIUM 40 MG: 40 TABLET, DELAYED RELEASE ORAL at 05:46

## 2025-06-24 RX ADMIN — SACUBITRIL AND VALSARTAN 1 TABLET: 24; 26 TABLET, FILM COATED ORAL at 22:14

## 2025-06-24 RX ADMIN — FLUTICASONE PROPIONATE 1 SPRAY: 50 SPRAY, METERED NASAL at 08:24

## 2025-06-24 RX ADMIN — POTASSIUM CHLORIDE 40 MEQ: 1500 TABLET, EXTENDED RELEASE ORAL at 17:03

## 2025-06-24 RX ADMIN — DOFETILIDE 125 MCG: 0.12 CAPSULE ORAL at 05:46

## 2025-06-24 RX ADMIN — ACETAMINOPHEN 650 MG: 325 TABLET ORAL at 22:16

## 2025-06-24 RX ADMIN — METOPROLOL SUCCINATE 50 MG: 50 TABLET, EXTENDED RELEASE ORAL at 22:16

## 2025-06-24 RX ADMIN — INSULIN LISPRO 1 UNITS: 100 INJECTION, SOLUTION INTRAVENOUS; SUBCUTANEOUS at 11:19

## 2025-06-24 RX ADMIN — LEVOTHYROXINE SODIUM 75 MCG: 0.07 TABLET ORAL at 05:46

## 2025-06-24 RX ADMIN — EMPAGLIFLOZIN 10 MG: 10 TABLET, FILM COATED ORAL at 08:25

## 2025-06-24 NOTE — PROGRESS NOTES
Advanced Heart Failure / Pulmonary Hypertension Service Progress Note    Vesna Langston 65 y.o. female   MRN: [id number]  Unit/Bed#: [facility] 526-01; Encounter: [id number]    Assessment:  Principal Problem:    Acute on chronic systolic (congestive) heart failure (HCC)  Active Problems:    Atrial flutter, paroxysmal (HCC)    Ischemic cardiomyopathy    History of sustained ventricular tachycardia    CAD (coronary artery disease)    Type 2 diabetes mellitus, without long-term current use of insulin (HCC)    Acquired hypothyroidism    Anemia    URI (upper respiratory infection)    Patient is a 65-year-old female with PMH of ischemic cardiomyopathy, MI 3/2024 status post PCI to LAD, HFimpEF 30 to 45%, VT, AFL who presented with shortness of breath and ongoing productive cough to outside hospital.  She was transferred to [facility] given concern for heart failure exacerbation and lack of cardiology coverage at OSH.    Subjective:   Patient seen and examined.  No significant events overnight. Mildly hypotensive this morning. Feels a bit drained but otherwise denies any cardiac complaints.    Objective:   Intake/ Output: 1790/3800 (-2010  Weight: 210>209 lbs  Telemetry: reviewed    Today's Plan:  Switched to oral torsemide Sunday with brisk diuresis on oral diuretics.  Mild creatinine bump, so diuretic dose reduced. Did not receive this morning due to borderline BP.  Diuretic holiday today. Would restart torsemide at 20 mg QD tomorrow morning with escalation plan in place as outpatient (additional 20 mg for weight gain, swelling, SOB, etc.) with potassium supplementation.  Entresto dose reduced to 24-26 mg BID given hypotension.  Keep K>4, Mg>2. PO potassium repletion ordered  Likely discharge tomorrow.   Strict I/Os, daily standing weights.    Plan:  Acute on chronic HFimpEF; LVEF 30 to 35% > PCI and GDMT > LVEF 40 to 45% October 2024  Etiology: ischemic     [facility] 03/22/2024: received PCI to 100% stenosis of ostial/proximal  "LAD.  TTE 03/23/2024: LVEF 35%. LVIDd 4.7 cm. RWMA. Normal RV. Moderate MR. Mild TR. Dilated IVC.   cMRI 03/26/2024: LVEF 20%. LVIDd 4.5 cm. \"Transmural scarring of the mid anterior, anteroseptal and inferoseptal walls, the apical anterior, septal and inferior walls and the apex.\"  TTE 10/8/2024: LVEF 40 to 45%.  Abnormal diastolic function.  Hypokinetic segments.  RV systolic function normal.  LA mildly dilated.  Mild TR.      Pharmacotherapies / Neurohormonal Blockade:  --Beta Blocker: metoprolol succinate 50 mg qHS.   --ARNi / ACEi / ARB: Entresto 49-51 mg twice daily > 24-26 mg BID  --Aldosterone Antagonist: No.   --SGLT2 Inhibitor: Jardiance 10 mg daily    Volume management:  --Home diuretic: Lasix 40 mg PRN (had self increased up to Lasix 80 mg twice daily x 3 days at home without good effect)  --Diuretic: Torsemide 60 mg daily > 20 mg QD with escalation as above      Sudden Cardiac Death Risk Reduction:  --Medtronic dual chamber ICD in situ since 03/2024 (secondary prevention).   --Interrogation from 6/17/2025: AP: 9.4%. : <0.1% (MVP-ON). ALL AVAILABLE LEAD PARAMETERS WITHIN NORMAL LIMITS. NO SIGNIFICANT HIGH RATE EPISODES. OPTI-VOL WITHIN NORMAL LIMITS. PT RECENTLY TOOK LASIX DUE TO OPTI-VOL CROSSED, NOW WNL.      Electrical Resynchronization:  --Candidacy for BiV device: narrow QRS.      Advanced Therapies: Will continue to monitor.     Coronary artery disease              Without active chest pain.   S/p STEMI in 03/2024; received PCI to 100% stenosis of ostial/proximal LAD.              Continues on Plavix, statin, and BB as above.     History of monomorphic ventricular tachycardia              Per EP notes, felt to be scar mediated.               S/p secondary prevention ICD.                 Atrial flutter, paroxysmal               [id number] = 5 (age, sex, HF, CAD, DM).              Anticoagulation on Eliquis.               Rate control: BB as above.              Rhythm control: Was on amiodarone, " "transitioned to dofetilide 125 mcg twice daily 11/2024 during elective Tikosyn admit     History of PE 5/2024  Hyperlipidemia  Diabetes mellitus, type II  Chronic back pain with multiple prior surgeries  History of tobacco abuse    Vitals:   Blood pressure 92/56, pulse 80, temperature 97.7 °F (36.5 °C), resp. rate 16, height 5' 8\" (1.727 m), weight 94.9 kg (209 lb 3.2 oz), SpO2 97%.    Body mass index is 31.81 kg/m².    I/O last 3 completed shifts:  In: 2012 [P.O.:2012]  Out: 4600 [Urine:4600]    Wt Readings from Last 10 Encounters:   06/24/25 94.9 kg (209 lb 3.2 oz)   06/20/25 99.7 kg (219 lb 12.8 oz)   06/16/25 96.6 kg (212 lb 15.4 oz)   06/14/25 96.6 kg (213 lb)   06/10/25 96.7 kg (213 lb 1.6 oz)   06/05/25 85.3 kg (188 lb)   06/04/25 85.5 kg (188 lb 7.9 oz)   06/02/25 95.2 kg (209 lb 14.1 oz)   05/30/25 94.3 kg (208 lb)   05/23/25 98.3 kg (216 lb 11.4 oz)     Vitals:    06/24/25 0731 06/24/25 0823 06/24/25 0913 06/24/25 0921   BP: (!) 88/56 90/55 (!) 87/57 92/56   BP Location:    Right arm   Pulse: 71 76 80    Resp:       Temp: 97.7 °F (36.5 °C)      TempSrc:       SpO2: 93%  97%    Weight:       Height:           Physical Exam  Constitutional:       Appearance: Normal appearance.   HENT:      Head: Normocephalic.      Nose: Nose normal.      Mouth/Throat:      Mouth: Mucous membranes are moist.     Eyes:      Conjunctiva/sclera: Conjunctivae normal.     Neck:      Vascular: No JVD.     Cardiovascular:      Rate and Rhythm: Normal rate and regular rhythm.      Comments: Trace edema BLLE   Pulmonary:      Effort: Pulmonary effort is normal.      Breath sounds: Normal breath sounds.     Musculoskeletal:      Cervical back: Neck supple.     Skin:     General: Skin is warm and dry.     Neurological:      General: No focal deficit present.      Mental Status: She is alert and oriented to person, place, and time.     Psychiatric:         Mood and Affect: Mood normal.         Behavior: Behavior normal.         Current " Medications[1]    Labs & Results:      Results from last 7 days   Lab Units 06/24/25  0507 06/23/25  0325 06/22/25  0426   WBC Thousand/uL 7.98 8.85 8.14   HEMOGLOBIN g/dL 11.3* 11.3* 11.9   HEMATOCRIT % 36.5 36.5 41.2   PLATELETS Thousands/uL 312 323 299         Results from last 7 days   Lab Units 06/24/25  0507 06/23/25  0325 06/22/25  0426 06/21/25  1644 06/20/25  2208   POTASSIUM mmol/L 3.2* 3.5 4.0   < > 3.6   CHLORIDE mmol/L 98 98 102  --  101   CO2 mmol/L 29 28 25  --  23   BUN mg/dL 28* 26* 17  --  17   CREATININE mg/dL 1.11 1.34* 0.83  --  0.84   CALCIUM mg/dL 9.5 9.3 9.5  --  9.5   ALK PHOS U/L  --   --   --   --  52   ALT U/L  --   --   --   --  11   AST U/L  --   --   --   --  19    < > = values in this interval not displayed.     Results from last 7 days   Lab Units 06/20/25  2208   INR  1.08         Thank you for the opportunity to participate in the care of this patient.    Chiara Worthington PA-C  Advanced Heart Failure  [facility]         [1]   Current Facility-Administered Medications:     acetaminophen (TYLENOL) tablet 650 mg, 650 mg, Oral, Q6H PRN, Philippe Parkinson MD, 650 mg at 06/24/25 0917    albuterol (PROVENTIL HFA,VENTOLIN HFA) inhaler 2 puff, 2 puff, Inhalation, Q4H PRN, Philippe Parkinson MD    apixaban (ELIQUIS) tablet 5 mg, 5 mg, Oral, BID, Philippe Parkinson MD, 5 mg at 06/24/25 0825    atorvastatin (LIPITOR) tablet 80 mg, 80 mg, Oral, QPM, Philippe Parkinson MD, 80 mg at 06/23/25 1907    clopidogrel (PLAVIX) tablet 75 mg, 75 mg, Oral, Daily, Philippe Parkinson MD, 75 mg at 06/24/25 0824    dextromethorphan-guaiFENesin (ROBITUSSIN DM) oral syrup 10 mL, 10 mL, Oral, Q4H PRN, Philippe Parkinson MD    dofetilide (TIKOSYN) capsule 125 mcg, 125 mcg, Oral, Q12H MONA, Philippe Parkinson MD, 125 mcg at 06/24/25 0546    Empagliflozin (JARDIANCE) tablet 10 mg, 10 mg, Oral, QAM, Philippe Parkinson MD, 10 mg at 06/24/25 0825    ergocalciferol (VITAMIN D2) capsule 50,000 Units, 50,000 Units, Oral, Weekly, Philippe  MD Iggy, 50,000 Units at 06/23/25 0824    ferrous gluconate (FERGON) tablet 324 mg, 324 mg, Oral, Every Other Day, Philippe Parkinson MD, 324 mg at 06/23/25 1158    fluticasone (FLONASE) 50 mcg/act nasal spray 1 spray, 1 spray, Nasal, Daily, Philippe Parkinson MD, 1 spray at 06/24/25 0824    insulin lispro (HumALOG/ADMELOG) 100 units/mL subcutaneous injection 1-5 Units, 1-5 Units, Subcutaneous, TID AC **AND** Fingerstick Glucose (POCT), , , TID AC, Philippe Parkinson MD    insulin lispro (HumALOG/ADMELOG) 100 units/mL subcutaneous injection 1-5 Units, 1-5 Units, Subcutaneous, HS, Philippe Parkinson MD    isosorbide mononitrate (IMDUR) 24 hr tablet 60 mg, 60 mg, Oral, Daily, Philippe Parkinson MD, 60 mg at 06/22/25 0915    levothyroxine tablet 75 mcg, 75 mcg, Oral, Early Morning, Philippe Parkinson MD, 75 mcg at 06/24/25 0546    metoprolol succinate (TOPROL-XL) 24 hr tablet 50 mg, 50 mg, Oral, HS, Philippe Parkinson MD, 50 mg at 06/23/25 2249    multivitamin stress formula tablet 1 tablet, 1 tablet, Oral, Daily, Philippe Parkinson MD, 1 tablet at 06/24/25 0824    oxyCODONE (ROXICODONE) IR tablet 5 mg, 5 mg, Oral, Q6H PRN, Philippe Parkinson MD    pantoprazole (PROTONIX) EC tablet 40 mg, 40 mg, Oral, BID AC, Philippe Parkinson MD, 40 mg at 06/24/25 0546    pregabalin (LYRICA) capsule 75 mg, 75 mg, Oral, TID, Philippe Parkinson MD, 75 mg at 06/24/25 0825    sacubitril-valsartan (ENTRESTO) 24-26 MG per tablet 1 tablet, 1 tablet, Oral, BID, Chiara Worthington PA-C, 1 tablet at 06/23/25 2123    torsemide (DEMADEX) tablet 40 mg, 40 mg, Oral, Daily, Chiara Worthington PA-C

## 2025-06-24 NOTE — ASSESSMENT & PLAN NOTE
Wt Readings from Last 3 Encounters:   06/24/25 94.9 kg (209 lb 3.2 oz)   06/20/25 99.7 kg (219 lb 12.8 oz)   06/16/25 96.6 kg (212 lb 15.4 oz)   Patient presented with complaints of worsening shortness of breath and chest tightness with associated weight gain despite taking Lasix 80 mg bid.   Patient transferred to [facility] for heart failure evaluation  Most recent echocardiogram 10/2024 showed EF of 40-45% improved from prior of 30%  Home regimen diuretic is Lasix 40 mg daily as needed however has been using Lasix 80 mg twice daily for the last few days  Home regimen GDMT is Toprol-XL 50 mg daily, Entresto 49/51 mg twice daily, Jardiance 10 mg daily and Isordil 60 mg daily   Plan  Currently on Demadex 40 mg daily and had great urine output significantly better in terms of breathing and swelling.  Plan to give Entresto dose tonight  Holding torsemide today.  Possible discharge tomorrow

## 2025-06-24 NOTE — ASSESSMENT & PLAN NOTE
Lab Results   Component Value Date    HGBA1C 5.9 (H) 03/28/2025       Recent Labs     06/23/25  1620 06/23/25  2050 06/24/25  0637 06/24/25  1025   POCGLU 104 144* 125 153*       Blood Sugar Average: Last 72 hrs:  (P) [id number]-controlled.    Continue with sliding scale insulin while inpatient.   Home regimen is weekly Ozempic.   Maintained on Jardiance 10 mg daily as part of GDMT.  Hypoglycemia protocol

## 2025-06-24 NOTE — ASSESSMENT & PLAN NOTE
[facility] 3/2024 that s/p PCI to 100% ostial/proximal LAD stenosis  Continue Plavix, Eliquis, high intensity atorvastatin and beta-blocker  EKG with no acute ischemic changes.    Troponins negative

## 2025-06-24 NOTE — PROGRESS NOTES
Progress Note - Hospitalist   Name: Vesna Langston 65 y.o. female I MRN: [id number]  Unit/Bed#: [facility] 526-01 I Date of Admission: 6/21/2025   Date of Service: 6/24/2025 I Hospital Day: 3     Assessment & Plan  Acute on chronic systolic (congestive) heart failure (HCC)  Wt Readings from Last 3 Encounters:   06/24/25 94.9 kg (209 lb 3.2 oz)   06/20/25 99.7 kg (219 lb 12.8 oz)   06/16/25 96.6 kg (212 lb 15.4 oz)   Patient presented with complaints of worsening shortness of breath and chest tightness with associated weight gain despite taking Lasix 80 mg bid.   Patient transferred to [facility]"" for heart failure evaluation  Most recent echocardiogram 10/2024 showed EF of 40-45% improved from prior of 30%  Home regimen diuretic is Lasix 40 mg daily as needed however has been using Lasix 80 mg twice daily for the last few days  Home regimen GDMT is Toprol-XL 50 mg daily, Entresto 49/51 mg twice daily, Jardiance 10 mg daily and Isordil 60 mg daily   Plan  Currently on Demadex 40 mg daily and had great urine output significantly better in terms of breathing and swelling.  Plan to give Entresto dose tonight  Holding torsemide today.  Possible discharge tomorrow      CAD (coronary artery disease)  [facility] 3/2024 that s/p PCI to 100% ostial/proximal LAD stenosis  Continue Plavix, Eliquis, high intensity atorvastatin and beta-blocker  EKG with no acute ischemic changes.    Troponins negative  Atrial flutter, paroxysmal (HCC)  Heart rates are currently controlled  Continue dofetilide 125 mcg twice daily and Toprol XL 50 mg daily  Continue Eliquis 5 mg twice daily  URI (upper respiratory infection)  Patient reports cough, chest tightness and shortness of breath for multiple weeks.  She reports bringing up dark phlegm.  Symptoms could be related to her CHF exacerbation.  She received multiple antibiotic courses recently for sinusitis and URI  COVID/flu/RSV.  Negative  Supportive care  Multiple chest x-rays and CT chest on June were negative for  pneumonia.  Acquired hypothyroidism  Continue levothyroxine 75 mcg daily  Anemia  Iron studies consistent with BIANKA  Hemoglobin stable around mid 9-10s since May 2025  Continue iron supplements  Monitor CBC while inpatient  History of sustained ventricular tachycardia  Noted that felt to be scar mediated per reviewing EP notes.  S/p ICD.  Ischemic cardiomyopathy  s/p PCI to 100% ostial/proximal LAD stenosis 3/2024  GDMT as above.   Type 2 diabetes mellitus, without long-term current use of insulin ([facility])  Lab Results   Component Value Date    HGBA1C 5.9 (H) 2025       Recent Labs     25  1620 25  2050 25  0637 25  1025   POCGLU 104 144* 125 153*       Blood Sugar Average: Last 72 hrs:  (P) [id number]-controlled.    Continue with sliding scale insulin while inpatient.   Home regimen is weekly Ozempic.   Maintained on Jardiance 10 mg daily as part of GDMT.  Hypoglycemia protocol        VTE Pharmacologic Prophylaxis:   Eliquis    Mobility:   Basic Mobility Inpatient Raw Score: 24  JH-HLM Goal: 8: Walk 250 feet or more  JH-HLM Achieved: 7: Walk 25 feet or more  JH-HLM Goal achieved. Continue to encourage appropriate mobility.    Patient Centered Rounds: I evaluated the patient without nursing staff present due to with another patient   Discussions with Specialists or Other Care Team Provider: Heart failure note reviewed    Education and Discussions with Family / Patient: Updated  (daughter) at bedside.    Current Length of Stay: 3 day(s)  Current Patient Status: Inpatient   Certification Statement: Monitoring  Discharge Plan: Anticipate discharge tomorrow to home.    Code Status: Level 1 - Full Code    Subjective:   Plan for discharge tomorrow    Objective:     Vitals:   Temp (24hrs), Av.9 °F (36.6 °C), Min:97.6 °F (36.4 °C), Max:98.3 °F (36.8 °C)    Temp:  [97.6 °F (36.4 °C)-98.3 °F (36.8 °C)] 97.7 °F (36.5 °C)  HR:  [70-96] 80  Resp:  [16-18] 16  BP:  ()/(53-64) 97/61  SpO2:  [92 %-97 %] 97 %  Body mass index is 31.81 kg/m².     Input and Output Summary (last 24 hours):     Intake/Output Summary (Last 24 hours) at 6/24/2025 1356  Last data filed at 6/24/2025 1201  Gross per 24 hour   Intake 1090 ml   Output 3050 ml   Net -1960 ml       Physical Exam  Vitals and nursing note reviewed.   Constitutional:       General: She is not in acute distress.     Appearance: She is well-developed.   HENT:      Head: Normocephalic and atraumatic.     Eyes:      Conjunctiva/sclera: Conjunctivae normal.       Cardiovascular:      Rate and Rhythm: Normal rate and regular rhythm.      Heart sounds: No murmur heard.  Pulmonary:      Effort: Pulmonary effort is normal. No respiratory distress.      Breath sounds: Normal breath sounds.   Abdominal:      Palpations: Abdomen is soft.      Tenderness: There is no abdominal tenderness.     Musculoskeletal:         General: No swelling.      Cervical back: Neck supple.     Skin:     General: Skin is warm and dry.      Capillary Refill: Capillary refill takes less than 2 seconds.     Neurological:      Mental Status: She is alert.     Psychiatric:         Mood and Affect: Mood normal.           Additional Data:     Labs:  Results from last 7 days   Lab Units 06/24/25  0507 06/22/25  0426 06/20/25  2208   WBC Thousand/uL 7.98   < > 9.18   HEMOGLOBIN g/dL 11.3*   < > 10.7*   HEMATOCRIT % 36.5   < > 35.3   PLATELETS Thousands/uL 312   < > 336   SEGS PCT %  --   --  57   LYMPHO PCT %  --   --  30   MONO PCT %  --   --  9   EOS PCT %  --   --  3    < > = values in this interval not displayed.     Results from last 7 days   Lab Units 06/24/25  0507 06/21/25  1644 06/20/25  2208   SODIUM mmol/L 139   < > 135   POTASSIUM mmol/L 3.2*   < > 3.6   CHLORIDE mmol/L 98   < > 101   CO2 mmol/L 29   < > 23   BUN mg/dL 28*   < > 17   CREATININE mg/dL 1.11   < > 0.84   ANION GAP mmol/L 12   < > 11   CALCIUM mg/dL 9.5   < > 9.5   ALBUMIN g/dL  --   --   4.4   TOTAL BILIRUBIN mg/dL  --   --  0.41   ALK PHOS U/L  --   --  52   ALT U/L  --   --  11   AST U/L  --   --  19   GLUCOSE RANDOM mg/dL 97   < > 123    < > = values in this interval not displayed.     Results from last 7 days   Lab Units 06/20/25  2208   INR  1.08     Results from last 7 days   Lab Units 06/24/25  1025 06/24/25  0637 06/23/25  2050 06/23/25  1620 06/23/25  1034 06/23/25  0616 06/22/25  2104 06/22/25  1659 06/22/25  1040 06/22/25  0554 06/21/25  2129 06/21/25  1646   POC GLUCOSE mg/dl 153* 125 144* 104 133 103 127 107 141* 111 118 118               Lines/Drains:  Invasive Devices       None                                  Imaging: Radiology Review: EKG was reviewed.     Recent Cultures (last 7 days):   Results from last 7 days   Lab Units 06/22/25  0300   SPUTUM CULTURE  4+ Growth of   GRAM STAIN RESULT  1+ Polys*  1+ Epithelial Cells*  1+ Gram negative rods*  1+ Gram positive cocci in pairs*       Last 24 Hours Medication List:   Current Facility-Administered Medications   Medication Dose Route Frequency Provider Last Rate    acetaminophen  650 mg Oral Q6H PRN Philippe Parkinson MD      albuterol  2 puff Inhalation Q4H PRN Philippe Parkinson MD      apixaban  5 mg Oral BID Philippe Parkinson MD      atorvastatin  80 mg Oral QPM Philippe Parkinson MD      clopidogrel  75 mg Oral Daily Philippe Parkinson MD      dextromethorphan-guaiFENesin  10 mL Oral Q4H PRN Philippe Parkinson MD      dofetilide  125 mcg Oral Q12H MONA Philippe Parkinson MD      Empagliflozin  10 mg Oral QAM Philippe Parkinson MD      ergocalciferol  50,000 Units Oral Weekly Philippe Parkinson MD      ferrous gluconate  324 mg Oral Every Other Day Philippe Parkinson MD      fluticasone  1 spray Nasal Daily Philippe Parkinson MD      insulin lispro  1-5 Units Subcutaneous TID AC Philippe Parkinson MD      insulin lispro  1-5 Units Subcutaneous HS Philippe Parkinson MD      isosorbide mononitrate  60 mg Oral Daily Philippe Parkinson MD      levothyroxine  75 mcg Oral Early Morning Philippe Parkinson,  MD      metoprolol succinate  50 mg Oral HS Philippe Parkinson MD      multivitamin stress formula  1 tablet Oral Daily Philippe Parkinson MD      oxyCODONE  5 mg Oral Q6H PRN Philippe Parkinson MD      pantoprazole  40 mg Oral BID AC Philippe Parkinson MD      potassium chloride  40 mEq Oral BID Chiara Worthington PA-C      pregabalin  75 mg Oral TID Philippe Parkinson MD      sacubitril-valsartan  1 tablet Oral BID Chiara Worthington PA-C          Today, Patient Was Seen By: Jaylon Fuentes MD    **Please Note: This note may have been constructed using a voice recognition system.**

## 2025-06-25 ENCOUNTER — TELEPHONE (OUTPATIENT)
Dept: CARDIOLOGY CLINIC | Facility: CLINIC | Age: 65
End: 2025-06-25

## 2025-06-25 VITALS
HEIGHT: 68 IN | DIASTOLIC BLOOD PRESSURE: 64 MMHG | TEMPERATURE: 97.6 F | RESPIRATION RATE: 20 BRPM | SYSTOLIC BLOOD PRESSURE: 110 MMHG | HEART RATE: 74 BPM | WEIGHT: 210.1 LBS | OXYGEN SATURATION: 96 % | BODY MASS INDEX: 31.84 KG/M2

## 2025-06-25 PROBLEM — J06.9 URI (UPPER RESPIRATORY INFECTION): Status: RESOLVED | Noted: 2025-06-21 | Resolved: 2025-06-25

## 2025-06-25 LAB
ANION GAP SERPL CALCULATED.3IONS-SCNC: 10 MMOL/L (ref 4–13)
BUN SERPL-MCNC: 22 MG/DL (ref 5–25)
CALCIUM SERPL-MCNC: 9.6 MG/DL (ref 8.4–10.2)
CHLORIDE SERPL-SCNC: 101 MMOL/L (ref 96–108)
CO2 SERPL-SCNC: 28 MMOL/L (ref 21–32)
CREAT SERPL-MCNC: 0.95 MG/DL (ref 0.6–1.3)
ERYTHROCYTE [DISTWIDTH] IN BLOOD BY AUTOMATED COUNT: 16.8 % (ref 11.6–15.1)
GFR SERPL CREATININE-BSD FRML MDRD: 62 ML/MIN/1.73SQ M
GLUCOSE SERPL-MCNC: 101 MG/DL (ref 65–140)
GLUCOSE SERPL-MCNC: 116 MG/DL (ref 65–140)
GLUCOSE SERPL-MCNC: 134 MG/DL (ref 65–140)
HCT VFR BLD AUTO: 36.1 % (ref 34.8–46.1)
HGB BLD-MCNC: 11.3 G/DL (ref 11.5–15.4)
MCH RBC QN AUTO: 25 PG (ref 26.8–34.3)
MCHC RBC AUTO-ENTMCNC: 31.3 G/DL (ref 31.4–37.4)
MCV RBC AUTO: 80 FL (ref 82–98)
PLATELET # BLD AUTO: 292 THOUSANDS/UL (ref 149–390)
PMV BLD AUTO: 9.7 FL (ref 8.9–12.7)
POTASSIUM SERPL-SCNC: 3.9 MMOL/L (ref 3.5–5.3)
RBC # BLD AUTO: 4.52 MILLION/UL (ref 3.81–5.12)
SODIUM SERPL-SCNC: 139 MMOL/L (ref 135–147)
WBC # BLD AUTO: 8.08 THOUSAND/UL (ref 4.31–10.16)

## 2025-06-25 PROCEDURE — 99239 HOSP IP/OBS DSCHRG MGMT >30: CPT

## 2025-06-25 PROCEDURE — 99233 SBSQ HOSP IP/OBS HIGH 50: CPT | Performed by: INTERNAL MEDICINE

## 2025-06-25 PROCEDURE — 82948 REAGENT STRIP/BLOOD GLUCOSE: CPT

## 2025-06-25 PROCEDURE — 85027 COMPLETE CBC AUTOMATED: CPT

## 2025-06-25 PROCEDURE — 80048 BASIC METABOLIC PNL TOTAL CA: CPT

## 2025-06-25 RX ORDER — TORSEMIDE 20 MG/1
20 TABLET ORAL DAILY
Qty: 90 TABLET | Refills: 0 | Status: SHIPPED | OUTPATIENT
Start: 2025-06-26 | End: 2025-09-24

## 2025-06-25 RX ORDER — ISOSORBIDE MONONITRATE 60 MG/1
60 TABLET, EXTENDED RELEASE ORAL DAILY
Qty: 90 TABLET | Refills: 0 | Status: SHIPPED | OUTPATIENT
Start: 2025-06-26 | End: 2025-09-24

## 2025-06-25 RX ADMIN — CLOPIDOGREL BISULFATE 75 MG: 75 TABLET, FILM COATED ORAL at 09:15

## 2025-06-25 RX ADMIN — PREGABALIN 75 MG: 75 CAPSULE ORAL at 09:15

## 2025-06-25 RX ADMIN — SACUBITRIL AND VALSARTAN 1 TABLET: 24; 26 TABLET, FILM COATED ORAL at 09:09

## 2025-06-25 RX ADMIN — PANTOPRAZOLE SODIUM 40 MG: 40 TABLET, DELAYED RELEASE ORAL at 05:00

## 2025-06-25 RX ADMIN — FLUTICASONE PROPIONATE 1 SPRAY: 50 SPRAY, METERED NASAL at 09:11

## 2025-06-25 RX ADMIN — EMPAGLIFLOZIN 10 MG: 10 TABLET, FILM COATED ORAL at 09:09

## 2025-06-25 RX ADMIN — FERROUS GLUCONATE 324 MG: 324 TABLET ORAL at 11:02

## 2025-06-25 RX ADMIN — TORSEMIDE 20 MG: 20 TABLET ORAL at 09:15

## 2025-06-25 RX ADMIN — ACETAMINOPHEN 650 MG: 325 TABLET ORAL at 05:00

## 2025-06-25 RX ADMIN — B-COMPLEX W/ C & FOLIC ACID TAB 1 TABLET: TAB at 09:15

## 2025-06-25 RX ADMIN — APIXABAN 5 MG: 5 TABLET, FILM COATED ORAL at 09:10

## 2025-06-25 RX ADMIN — DOFETILIDE 125 MCG: 0.12 CAPSULE ORAL at 07:30

## 2025-06-25 RX ADMIN — LEVOTHYROXINE SODIUM 75 MCG: 0.07 TABLET ORAL at 05:00

## 2025-06-25 RX ADMIN — ISOSORBIDE MONONITRATE 60 MG: 60 TABLET, EXTENDED RELEASE ORAL at 09:16

## 2025-06-25 NOTE — PLAN OF CARE
Problem: PAIN - ADULT  Goal: Verbalizes/displays adequate comfort level or baseline comfort level  Description: Interventions:  - Encourage patient to monitor pain and request assistance  - Assess pain using appropriate pain scale  - Administer analgesics as ordered based on type and severity of pain and evaluate response  - Implement non-pharmacological measures as appropriate and evaluate response  - Consider cultural and social influences on pain and pain management  - Notify physician/advanced practitioner if interventions unsuccessful or patient reports new pain  - Educate patient/family on pain management process including their role and importance of  reporting pain   - Provide non-pharmacologic/complimentary pain relief interventions  Outcome: Progressing     Problem: INFECTION - ADULT  Goal: Absence or prevention of progression during hospitalization  Description: INTERVENTIONS:  - Assess and monitor for signs and symptoms of infection  - Monitor lab/diagnostic results  - Monitor all insertion sites, i.e. indwelling lines, tubes, and drains  - Monitor endotracheal if appropriate and nasal secretions for changes in amount and color  - Theodore appropriate cooling/warming therapies per order  - Administer medications as ordered  - Instruct and encourage patient and family to use good hand hygiene technique  - Identify and instruct in appropriate isolation precautions for identified infection/condition  Outcome: Progressing  Goal: Absence of fever/infection during neutropenic period  Description: INTERVENTIONS:  - Monitor WBC  - Perform strict hand hygiene  - Limit to healthy visitors only  - No plants, dried, fresh or silk flowers with castillo in patient room  Outcome: Progressing     Problem: SAFETY ADULT  Goal: Patient will remain free of falls  Description: INTERVENTIONS:  - Educate patient/family on patient safety including physical limitations  - Instruct patient to call for assistance with activity   -  Consider consulting OT/PT to assist with strengthening/mobility based on AM PAC & JH-HLM score  - Consult OT/PT to assist with strengthening/mobility   - Keep Call bell within reach  - Keep bed low and locked with side rails adjusted as appropriate  - Keep care items and personal belongings within reach  - Initiate and maintain comfort rounds  - Make Fall Risk Sign visible to staff  - Offer Toileting every  Hours, in advance of need  - Initiate/Maintain alarm  - Obtain necessary fall risk management equipment:   - Apply yellow socks and bracelet for high fall risk patients  - Consider moving patient to room near nurses station  Outcome: Progressing  Goal: Maintain or return to baseline ADL function  Description: INTERVENTIONS:  -  Assess patient's ability to carry out ADLs; assess patient's baseline for ADL function and identify physical deficits which impact ability to perform ADLs (bathing, care of mouth/teeth, toileting, grooming, dressing, etc.)  - Assess/evaluate cause of self-care deficits   - Assess range of motion  - Assess patient's mobility; develop plan if impaired  - Assess patient's need for assistive devices and provide as appropriate  - Encourage maximum independence but intervene and supervise when necessary  - Involve family in performance of ADLs  - Assess for home care needs following discharge   - Consider OT consult to assist with ADL evaluation and planning for discharge  - Provide patient education as appropriate  - Monitor functional capacity and physical performance, use of AM PAC & JH-HLM   - Monitor gait, balance and fatigue with ambulation    Outcome: Progressing  Goal: Maintains/Returns to pre admission functional level  Description: INTERVENTIONS:  - Perform AM-PAC 6 Click Basic Mobility/ Daily Activity assessment daily.  - Set and communicate daily mobility goal to care team and patient/family/caregiver.   - Collaborate with rehabilitation services on mobility goals if consulted  -  Perform Range of Motion  times a day.  - Reposition patient every  hours.  - Dangle patient  times a day  - Stand patient  times a day  - Ambulate patient  times a day  - Out of bed to chair  times a day   - Out of bed for meals  times a day  - Out of bed for toileting  - Record patient progress and toleration of activity level   Outcome: Progressing     Problem: DISCHARGE PLANNING  Goal: Discharge to home or other facility with appropriate resources  Description: INTERVENTIONS:  - Identify barriers to discharge w/patient and caregiver  - Arrange for needed discharge resources and transportation as appropriate  - Identify discharge learning needs (meds, wound care, etc.)  - Arrange for interpretive services to assist at discharge as needed  - Refer to Case Management Department for coordinating discharge planning if the patient needs post-hospital services based on physician/advanced practitioner order or complex needs related to functional status, cognitive ability, or social support system  Outcome: Progressing     Problem: Knowledge Deficit  Goal: Patient/family/caregiver demonstrates understanding of disease process, treatment plan, medications, and discharge instructions  Description: Complete learning assessment and assess knowledge base.  Interventions:  - Provide teaching at level of understanding  - Provide teaching via preferred learning methods  Outcome: Progressing     Problem: CARDIOVASCULAR - ADULT  Goal: Maintains optimal cardiac output and hemodynamic stability  Description: INTERVENTIONS:  - Monitor I/O, vital signs and rhythm  - Monitor for S/S and trends of decreased cardiac output  - Administer and titrate ordered vasoactive medications to optimize hemodynamic stability  - Assess quality of pulses, skin color and temperature  - Assess for signs of decreased coronary artery perfusion  - Instruct patient to report change in severity of symptoms  Outcome: Progressing  Goal: Absence of cardiac  dysrhythmias or at baseline rhythm  Description: INTERVENTIONS:  - Continuous cardiac monitoring, vital signs, obtain 12 lead EKG if ordered  - Administer antiarrhythmic and heart rate control medications as ordered  - Monitor electrolytes and administer replacement therapy as ordered  Outcome: Progressing

## 2025-06-25 NOTE — CASE MANAGEMENT
Case Management Discharge Planning Note    Patient name Vesna Langston  Location [facility] 526-01 MRN [id number]  : 1960 Date 2025       Current Admission Date: 2025  Current Admission Diagnosis:Acute on chronic systolic (congestive) heart failure (HCC)   Patient Active Problem List    Diagnosis Date Noted    Vitamin D deficiency 2025    Acute pain 2025    GERD (gastroesophageal reflux disease) 2025    Hypothyroid 2025    Anemia 2025    Status post lumbar spinal fusion 2025    Other spondylosis with radiculopathy, lumbar region 03/10/2025    Stage 2 chronic kidney disease 2025    Other pulmonary embolism without acute cor pulmonale, unspecified chronicity ([facility]) 2025    Acute on chronic systolic (congestive) heart failure ([facility]) 2025    Obesity, morbid ([facility]) 2025    T2DM (type 2 diabetes mellitus) ([facility]) 2025    Primary osteoarthritis of right knee 10/08/2024    History of torn meniscus of right knee 10/08/2024    Chronic pain of right knee 10/08/2024    Localized edema 10/02/2024    POP (obstructive sleep apnea) 2024    Class 1 obesity due to excess calories with serious comorbidity and body mass index (BMI) of 34.0 to 34.9 in adult 2024    History of pulmonary embolism 2024    History of gastric ulcer 2024    Acquired hypothyroidism 2024    Constipation 04/10/2024    Type 2 diabetes mellitus, without long-term current use of insulin ([facility]) 2024    S/P ICD (internal cardiac defibrillator) procedure 2024    Chronic low back pain 2024    HFrEF (heart failure with reduced ejection fraction) ([facility]) 2024    Ischemic cardiomyopathy 2024    CAD (coronary artery disease) 2024    S/P coronary artery stent placement 2024    Atrial flutter, paroxysmal ([facility]) 2024    Mixed hyperlipidemia 2024    History of tobacco abuse 2024    History of sustained  ventricular tachycardia 03/22/2024    Back pain 07/31/2022    Sciatica of left side 07/31/2022      LOS (days): 4  Geometric Mean LOS (GMLOS) (days):   Days to GMLOS:     OBJECTIVE:  Risk of Unplanned Readmission Score: 26.38         Current admission status: Inpatient   Preferred Pharmacy:   [facility]/pharmacy #[id number] [place], [place] - [address]  [address] [address]  Phone: [phone] Fax: [phone]    [facility]pharm[facility] #[id number] [place], [place] - [address]  [facility]  [address]  [facility] [address]  Phone: [phone] Fax: [phone]    Primary Care Provider: Melissa Montoya DO    Primary Insurance: BLUE CROSS  Secondary Insurance: MEDICARE    DISCHARGE DETAILS:    Transport at Discharge : Ride Share     Number/Name of Dispatcher: [employer]  Transported by (Company and Unit #): [employer]  ETA of Transport (Date): 06/25/25  ETA of Transport (Time): 1300       CM met with Pt to review transportation waiver. Pt signed. Placed in scan bin.    CM coordinated with RN for [employer] time. CM requested 1pm [employer] via [employer]. RN aware.

## 2025-06-25 NOTE — ASSESSMENT & PLAN NOTE
[facility] 3/2024 that s/p PCI to 100% ostial/proximal LAD stenosis  Continue Plavix, Eliquis, high intensity atorvastatin and beta-blocker  EKG with no acute ischemic changes.    Troponins negative

## 2025-06-25 NOTE — ASSESSMENT & PLAN NOTE
Wt Readings from Last 3 Encounters:   06/25/25 95.3 kg (210 lb 1.6 oz)   06/20/25 99.7 kg (219 lb 12.8 oz)   06/16/25 96.6 kg (212 lb 15.4 oz)   Patient presented with complaints of worsening shortness of breath and chest tightness with associated weight gain despite taking Lasix 80 mg bid.   Patient transferred to hospitals for heart failure evaluation  Most recent echocardiogram 10/2024 showed EF of 40-45% improved from prior of 30%  Home regimen diuretic is Lasix 40 mg daily as needed however has been using Lasix 80 mg twice daily for the last few days  Home regimen GDMT is Toprol-XL 50 mg daily, Entresto 49/51 mg twice daily, Jardiance 10 mg daily and Isordil 60 mg daily   Plan  Patient had complications of hypoattention during hospital stay.  Decrease the dose of Entresto  Patient is taking torsemide 20 mg once daily  Advise close follow-up with cardiology  Advise BMP repeat after 1 week  Medically cleared for discharge today with no needs no rehab needs

## 2025-06-25 NOTE — PROGRESS NOTES
"          Advanced Heart Failure / Pulmonary Hypertension Service Progress Note    Vesna Langston 65 y.o. female   MRN: [id number]  Unit/Bed#: [facility] 526-01; Encounter: [id number]    Assessment:  Principal Problem:    Acute on chronic systolic (congestive) heart failure (HCC)  Active Problems:    Atrial flutter, paroxysmal (HCC)    Ischemic cardiomyopathy    History of sustained ventricular tachycardia    CAD (coronary artery disease)    Type 2 diabetes mellitus, without long-term current use of insulin (HCC)    Acquired hypothyroidism    Anemia    URI (upper respiratory infection)    Patient is a 65-year-old female with PMH of ischemic cardiomyopathy, MI 3/2024 status post PCI to LAD, HFimpEF 30 to 45%, VT, AFL who presented with shortness of breath and ongoing productive cough to outside hospital.  She was transferred to [facility] given concern for heart failure exacerbation and lack of cardiology coverage at OSH.       Subjective:   Patient seen and examined. Pt feeling well overall.  Ready to DC to home    Objective:   Intake/ Output: 1580/ 1875 (-295)  Weight: +1 lb  Telemetry:     Today's Plan:  Torsemide 20 mg QD--restarted this a.m. Euvolemic on exam.  Add PRN dose o/p for rapid weight gain, worsening HF symptoms  Entresto dose reduced to 24-26 mg BID given hypotension. Cont' jardiance, Toprol XL 50 QD. BP's stabilized 111/67   For Atrial Flutter: Eliquis,  BB, Tikosyn   For CAD: plavix, statin  (no asa-- on eliquis)  Will f/u with Dr. Bell on 7/15.  Pt prefers not to see AP in between--will try to assist pt with sooner appt in clinic    Plan:  Acute on chronic HFimpEF; LVEF 30 to 35% > PCI and GDMT > LVEF 40 to 45% October 2024  Etiology: ischemic      C 03/22/2024: received PCI to 100% stenosis of ostial/proximal LAD.  TTE 03/23/2024: LVEF 35%. LVIDd 4.7 cm. RWMA. Normal RV. Moderate MR. Mild TR. Dilated IVC.   cMRI 03/26/2024: LVEF 20%. LVIDd 4.5 cm. \"Transmural scarring of the mid anterior, anteroseptal and " "inferoseptal walls, the apical anterior, septal and inferior walls and the apex.\"  TTE 10/8/2024: LVEF 40 to 45%.  Abnormal diastolic function.  Hypokinetic segments.  RV systolic function normal.  LA mildly dilated.  Mild TR.        Pharmacotherapies / Neurohormonal Blockade:  --Beta Blocker: metoprolol succinate 50 mg qHS.   --ARNi / ACEi / ARB: Entresto 49-51 mg twice daily > 24-26 mg BID  --Aldosterone Antagonist: No.   --SGLT2 Inhibitor: Jardiance 10 mg daily     Volume management:  --Home diuretic: Lasix 40 mg PRN (had self increased up to Lasix 80 mg twice daily x 3 days at home without good effect)  --Diuretic: Torsemide 60 mg daily > 20 mg QD      Sudden Cardiac Death Risk Reduction:  --Medtronic dual chamber ICD in situ since 03/2024 (secondary prevention).   --Interrogation from 6/17/2025: AP: 9.4%. : <0.1% (MVP-ON). ALL AVAILABLE LEAD PARAMETERS WITHIN NORMAL LIMITS. NO SIGNIFICANT HIGH RATE EPISODES. OPTI-VOL WITHIN NORMAL LIMITS. PT RECENTLY TOOK LASIX DUE TO OPTI-VOL CROSSED, NOW WNL.      Electrical Resynchronization:  --Candidacy for BiV device: narrow QRS.      Advanced Therapies: Will continue to monitor.     Coronary artery disease              Without active chest pain.   S/p STEMI in 03/2024; received PCI to 100% stenosis of ostial/proximal LAD.              Continues on Plavix, statin, and BB as above.     History of monomorphic ventricular tachycardia              Per EP notes, felt to be scar mediated.               S/p secondary prevention ICD.                 Atrial flutter, paroxysmal               [id number] = 5 (age, sex, HF, CAD, DM).              Anticoagulation on Eliquis.               Rate control: BB as above.              Rhythm control: Was on amiodarone, transitioned to dofetilide 125 mcg twice daily 11/2024 during elective Tikosyn admit     History of PE 5/2024  Hyperlipidemia  Diabetes mellitus, type II  Chronic back pain with multiple prior surgeries  History of tobacco " "abuse      Vitals:   Blood pressure 111/73, pulse 77, temperature 98.5 °F (36.9 °C), temperature source Oral, resp. rate 16, height 5' 8\" (1.727 m), weight 94.9 kg (209 lb 3.2 oz), SpO2 95%.    Body mass index is 31.81 kg/m².    I/O last 3 completed shifts:  In: 2870 [P.O.:2870]  Out: 4825 [Urine:4825]    Wt Readings from Last 10 Encounters:   06/24/25 94.9 kg (209 lb 3.2 oz)   06/20/25 99.7 kg (219 lb 12.8 oz)   06/16/25 96.6 kg (212 lb 15.4 oz)   06/14/25 96.6 kg (213 lb)   06/10/25 96.7 kg (213 lb 1.6 oz)   06/05/25 85.3 kg (188 lb)   06/04/25 85.5 kg (188 lb 7.9 oz)   06/02/25 95.2 kg (209 lb 14.1 oz)   05/30/25 94.3 kg (208 lb)   05/23/25 98.3 kg (216 lb 11.4 oz)     Vitals:    06/24/25 1502 06/24/25 2014 06/24/25 2204 06/24/25 2220   BP: (!) 88/57 115/72 97/56 111/73   BP Location: Right arm  Left arm    Pulse: 75 70 79 77   Resp: 17  16    Temp: 98.3 °F (36.8 °C)  98.5 °F (36.9 °C)    TempSrc: Oral  Oral    SpO2: 97% 95% 95% 95%   Weight:       Height:           Physical Exam  Constitutional:       Appearance: Normal appearance.   Neck:      Vascular: No JVD.     Cardiovascular:      Rate and Rhythm: Normal rate and regular rhythm.      Heart sounds: S1 normal and S2 normal.   Pulmonary:      Effort: No respiratory distress.      Breath sounds: Normal breath sounds and air entry.     Musculoskeletal:      Right lower leg: No edema.      Left lower leg: No edema.     Skin:     General: Skin is warm and dry.     Neurological:      Mental Status: She is alert and oriented to person, place, and time. Mental status is at baseline.     Psychiatric:         Behavior: Behavior is cooperative.         Cognition and Memory: Cognition normal.         Current Medications[1]    Labs & Results:      Results from last 7 days   Lab Units 06/24/25  0507 06/23/25  0325 06/22/25  0426   WBC Thousand/uL 7.98 8.85 8.14   HEMOGLOBIN g/dL 11.3* 11.3* 11.9   HEMATOCRIT % 36.5 36.5 41.2   PLATELETS Thousands/uL 312 323 299       "   Results from last 7 days   Lab Units 06/24/25  0507 06/23/25  0325 06/22/25  0426 06/21/25  1644 06/20/25  2208   POTASSIUM mmol/L 3.2* 3.5 4.0   < > 3.6   CHLORIDE mmol/L 98 98 102  --  101   CO2 mmol/L 29 28 25  --  23   BUN mg/dL 28* 26* 17  --  17   CREATININE mg/dL 1.11 1.34* 0.83  --  0.84   CALCIUM mg/dL 9.5 9.3 9.5  --  9.5   ALK PHOS U/L  --   --   --   --  52   ALT U/L  --   --   --   --  11   AST U/L  --   --   --   --  19    < > = values in this interval not displayed.     Results from last 7 days   Lab Units 06/20/25  2208   INR  1.08         Thank you for the opportunity to participate in the care of this patient.    MAHI Reid  Advanced Heart Failure  [facility]          [1]   Current Facility-Administered Medications:     acetaminophen (TYLENOL) tablet 650 mg, 650 mg, Oral, Q6H PRN, Philippe Parkinson MD, 650 mg at 06/24/25 2216    albuterol (PROVENTIL HFA,VENTOLIN HFA) inhaler 2 puff, 2 puff, Inhalation, Q4H PRN, Philippe Parkinson MD    apixaban (ELIQUIS) tablet 5 mg, 5 mg, Oral, BID, Philippe Parkinson MD, 5 mg at 06/24/25 1703    atorvastatin (LIPITOR) tablet 80 mg, 80 mg, Oral, QPM, Philippe Parkinson MD, 80 mg at 06/24/25 1703    clopidogrel (PLAVIX) tablet 75 mg, 75 mg, Oral, Daily, Philippe Parkinson MD, 75 mg at 06/24/25 0824    dextromethorphan-guaiFENesin (ROBITUSSIN DM) oral syrup 10 mL, 10 mL, Oral, Q4H PRN, Philippe Parkinson MD    dofetilide (TIKOSYN) capsule 125 mcg, 125 mcg, Oral, Q12H MONA, Philippe Parkinson MD, 125 mcg at 06/24/25 2005    Empagliflozin (JARDIANCE) tablet 10 mg, 10 mg, Oral, QAM, Philippe Parkinson MD, 10 mg at 06/24/25 0825    ergocalciferol (VITAMIN D2) capsule 50,000 Units, 50,000 Units, Oral, Weekly, Philippe Parkinson MD, 50,000 Units at 06/23/25 0824    ferrous gluconate (FERGON) tablet 324 mg, 324 mg, Oral, Every Other Day, Philippe Parkinson MD, 324 mg at 06/23/25 1158    fluticasone (FLONASE) 50 mcg/act nasal spray 1 spray, 1 spray, Nasal, Daily, Philippe Parkinson MD, 1  spray at 06/24/25 0824    insulin lispro (HumALOG/ADMELOG) 100 units/mL subcutaneous injection 1-5 Units, 1-5 Units, Subcutaneous, TID AC, 1 Units at 06/24/25 1119 **AND** Fingerstick Glucose (POCT), , , TID AC, Philippe Parkinson MD    insulin lispro (HumALOG/ADMELOG) 100 units/mL subcutaneous injection 1-5 Units, 1-5 Units, Subcutaneous, HS, Philippe Parkinson MD    isosorbide mononitrate (IMDUR) 24 hr tablet 60 mg, 60 mg, Oral, Daily, Philippe Parkinson MD, 60 mg at 06/22/25 0915    levothyroxine tablet 75 mcg, 75 mcg, Oral, Early Morning, Philippe Parkinson MD, 75 mcg at 06/24/25 0546    metoprolol succinate (TOPROL-XL) 24 hr tablet 50 mg, 50 mg, Oral, HS, Philippe Parkinson MD, 50 mg at 06/24/25 2216    multivitamin stress formula tablet 1 tablet, 1 tablet, Oral, Daily, Philippe Parkinson MD, 1 tablet at 06/24/25 0824    oxyCODONE (ROXICODONE) IR tablet 5 mg, 5 mg, Oral, Q6H PRN, Philippe Parkinson MD    pantoprazole (PROTONIX) EC tablet 40 mg, 40 mg, Oral, BID AC, Philippe Parkinson MD, 40 mg at 06/24/25 1703    pregabalin (LYRICA) capsule 75 mg, 75 mg, Oral, TID, Philippe Parkinson MD, 75 mg at 06/24/25 2214    sacubitril-valsartan (ENTRESTO) 24-26 MG per tablet 1 tablet, 1 tablet, Oral, BID, Chiara Worthington PA-C, 1 tablet at 06/24/25 2214    [START ON 6/25/2025] torsemide (DEMADEX) tablet 20 mg, 20 mg, Oral, Daily, Chiara Worthington PA-C

## 2025-06-25 NOTE — ASSESSMENT & PLAN NOTE
Lab Results   Component Value Date    HGBA1C 5.9 (H) 03/28/2025       Recent Labs     06/24/25  1601 06/24/25 2055 06/25/25  0645 06/25/25  1031   POCGLU 116 119 116 134       Blood Sugar Average: Last 72 hrs:  (P) [id number]-controlled.    Continue home regimen.

## 2025-06-25 NOTE — ASSESSMENT & PLAN NOTE
Patient reports cough, chest tightness and shortness of breath for multiple weeks.  She reports bringing up dark phlegm.  Symptoms could be related to her CHF exacerbation.  She received multiple antibiotic courses recently for sinusitis and URI  COVID/flu/RSV.  Negative  Appears resolved now

## 2025-06-25 NOTE — TELEPHONE ENCOUNTER
"Hello,    The following message was sent via e-mail to the leadership team:     Please advise if you can help facilitate the following overbook request:    Patient Name: Vesna Langston     Patient MRN: [id number]     Call back #: [phone]     Insurance:  [employer]ENSATION     Department:Cardiology    Speciality: Heart Failure    Reason for overbook request: 24-72 HOUR HEART FAILURE HOSPITAL FOLLOW UP    Comments (Write \"N/a\" if no comments): Pt wants to be seen by Dr. Bell ONLY,due to complications and medication changes    Requested doctor and location: [facility]- Dr. Bell ONLY    Date of current appointment: 07/15/25      Thank you.  "

## 2025-06-25 NOTE — CASE MANAGEMENT
Case Management Discharge Planning Note    Patient name Vesna Langston  Location [facility]-01 MRN [id number]  : 1960 Date 2025       Current Admission Date: 2025  Current Admission Diagnosis:Acute on chronic systolic (congestive) heart failure (Formerly [facility])   Patient Active Problem List    Diagnosis Date Noted    URI (upper respiratory infection) 2025    Vitamin D deficiency 2025    Acute pain 2025    GERD (gastroesophageal reflux disease) 2025    Hypothyroid 2025    Anemia 2025    Status post lumbar spinal fusion 2025    Other spondylosis with radiculopathy, lumbar region 03/10/2025    Stage 2 chronic kidney disease 2025    Other pulmonary embolism without acute cor pulmonale, unspecified chronicity (Formerly [facility]) 2025    Acute on chronic systolic (congestive) heart failure (Formerly [facility]) 2025    Obesity, morbid (Formerly [facility]) 2025    T2DM (type 2 diabetes mellitus) (Formerly [facility]) 2025    Primary osteoarthritis of right knee 10/08/2024    History of torn meniscus of right knee 10/08/2024    Chronic pain of right knee 10/08/2024    Localized edema 10/02/2024    POP (obstructive sleep apnea) 2024    Class 1 obesity due to excess calories with serious comorbidity and body mass index (BMI) of 34.0 to 34.9 in adult 2024    History of pulmonary embolism 2024    History of gastric ulcer 2024    Acquired hypothyroidism 2024    Constipation 04/10/2024    Type 2 diabetes mellitus, without long-term current use of insulin (Formerly [facility]) 2024    S/P ICD (internal cardiac defibrillator) procedure 2024    Chronic low back pain 2024    HFrEF (heart failure with reduced ejection fraction) (Formerly [facility]) 2024    Ischemic cardiomyopathy 2024    CAD (coronary artery disease) 2024    S/P coronary artery stent placement 2024    Atrial flutter, paroxysmal (Formerly [facility]) 2024    Mixed hyperlipidemia 2024    History of tobacco  abuse 03/22/2024    History of sustained ventricular tachycardia 03/22/2024    Back pain 07/31/2022    Sciatica of left side 07/31/2022      LOS (days): 4  Geometric Mean LOS (GMLOS) (days):   Days to GMLOS:     OBJECTIVE:  Risk of Unplanned Readmission Score: 26.08         Current admission status: Inpatient   Preferred Pharmacy:   [facility]/pharmacy #[id number] [place] [address] - [address] [place] [address]  Phone: [phone] Fax: [phone]    [id number] - [place], [place] - [address] [facility]  [address] [facility] [address]  Phone: [phone] Fax: [phone]    Primary Care Provider: Melissa Montoya DO    Primary Insurance: BLUE CROSS  Secondary Insurance: MEDICARE    DISCHARGE DETAILS:    Discharge planning discussed with:: Patient     Treatment Team Recommendation: Home  Expected Discharge Disposition: Home or Self Care     Transport at Discharge : Ride Share     IMM Given (Date):: 06/25/25  IMM Given to:: Patient       CM reviewed IMM with pt; No questions or concerns. Pt in agreement with rights, and is aware of the right to appeal d/c once medically stable if desired. IMM signed.     Pt shared she will need a ride home. She was transferred here from [facility]. Pt requests ride to [facility] at [address] [place] [address]    CM will continue to follow for DC Planning needs and request lyft when Pt is clear for DC

## 2025-06-25 NOTE — DISCHARGE SUMMARY
Discharge Summary - Hospitalist   Name: Vesna Langston 65 y.o. female I MRN: [id number]  Unit/Bed#: [facility]-01 I Date of Admission: 6/21/2025   Date of Service: 6/25/2025 I Hospital Day: 4     Assessment & Plan  Acute on chronic systolic (congestive) heart failure (HCC)  Wt Readings from Last 3 Encounters:   06/25/25 95.3 kg (210 lb 1.6 oz)   06/20/25 99.7 kg (219 lb 12.8 oz)   06/16/25 96.6 kg (212 lb 15.4 oz)   Patient presented with complaints of worsening shortness of breath and chest tightness with associated weight gain despite taking Lasix 80 mg bid.   Patient transferred to [facility] for heart failure evaluation  Most recent echocardiogram 10/2024 showed EF of 40-45% improved from prior of 30%  Home regimen diuretic is Lasix 40 mg daily as needed however has been using Lasix 80 mg twice daily for the last few days  Home regimen GDMT is Toprol-XL 50 mg daily, Entresto 49/51 mg twice daily, Jardiance 10 mg daily and Isordil 60 mg daily   Plan  Patient had complications of hypoattention during hospital stay.  Decrease the dose of Entresto  Patient is taking torsemide 20 mg once daily  Advise close follow-up with cardiology  Advise BMP repeat after 1 week  Medically cleared for discharge today with no needs no rehab needs      CAD (coronary artery disease)  [facility] 3/2024 that s/p PCI to 100% ostial/proximal LAD stenosis  Continue Plavix, Eliquis, high intensity atorvastatin and beta-blocker  EKG with no acute ischemic changes.    Troponins negative  Atrial flutter, paroxysmal (HCC)  Heart rates are currently controlled  Continue dofetilide 125 mcg twice daily and Toprol XL 50 mg daily  Continue Eliquis 5 mg twice daily  URI (upper respiratory infection) (Resolved: 6/25/2025)  Patient reports cough, chest tightness and shortness of breath for multiple weeks.  She reports bringing up dark phlegm.  Symptoms could be related to her CHF exacerbation.  She received multiple antibiotic courses recently for sinusitis and  URI  COVID/flu/RSV.  Negative  Appears resolved now  Acquired hypothyroidism  Continue levothyroxine 75 mcg daily  Anemia  Iron studies consistent with BIANKA  Hemoglobin stable around mid 9-10s since May 2025  Continue iron supplements  Monitor CBC while inpatient  History of sustained ventricular tachycardia  Noted that felt to be scar mediated per reviewing EP notes.  S/p ICD.  Ischemic cardiomyopathy  s/p PCI to 100% ostial/proximal LAD stenosis 3/2024  GDMT as above.   Type 2 diabetes mellitus, without long-term current use of insulin ([facility])  Lab Results   Component Value Date    HGBA1C 5.9 (H) 03/28/2025       Recent Labs     06/24/25  1601 06/24/25  2055 06/25/25  0645 06/25/25  1031   POCGLU 116 119 116 134       Blood Sugar Average: Last 72 hrs:  (P) 123.7857142857142857Well-controlled.    Continue home regimen.     Medical Problems       Resolved Problems  Date Reviewed: 6/16/2025          Resolved    URI (upper respiratory infection) 6/25/2025     Resolved by  Jaylon Fuentes MD        Discharging Physician / Practitioner: Jaylon Fuentes MD  PCP: Melissa Montoya DO  Admission Date:   Admission Orders (From admission, onward)       Ordered        06/21/25 1538  INPATIENT ADMISSION  Once                          Discharge Date: 06/25/25    Next Steps for Physician/AP Assuming Care:  Patient had complications of hypoattention during hospital stay.  Decrease the dose of Entresto  Patient is taking torsemide 20 mg once daily  Advise close follow-up with cardiology  Advise BMP repeat after 1 week  Medically cleared for discharge today with no needs no rehab needs    Test Results Pending at Discharge (will require follow up):  None    Medication Changes for Discharge & Rationale:   Decrease the dose of Entresto  Patient is taking torsemide 20 mg once daily  Advise close follow-up with cardiology  Consultations During Hospital Stay:  Heart failure    Procedures Performed:   None    Significant  "Findings / Test Results:   No results found.    No Chest XR results available for this patient.       Incidental Findings:   None       Hospital Course:   Vesna Langston is a 65 y.o. female patient who originally presented to the hospital on 6/21/2025 due to acute heart failure cardiology was on board.  Patient received IV Lasix for a few days and had marked improvement was complicated by hypotensive episodes and Entresto was decreased, isosorbide was decreased.  Today medically cleared for discharge advised follow-up with cardiology.  Patient was doing laps around the [facility] and had marked improvement since admission.  Agrees with plan noted above.    Patient had complications of hypoattention during hospital stay.  Decrease the dose of Entresto  Patient is taking torsemide 20 mg once daily  Advise close follow-up with cardiology  Advise BMP repeat after 1 week  Medically cleared for discharge today with no needs no rehab needs  Please see above list of diagnoses and related plan for additional information.     Discharge Day Visit / Exam:   Subjective:  Patient does not report and headaches, shortness of breath, chest pain, abdominal pain, nausea vomiting, lower leg edema. Also reports good sleep    Vitals: Blood Pressure: 110/64 (06/25/25 1033)  Pulse: 74 (06/25/25 1033)  Temperature: 97.6 °F (36.4 °C) (06/25/25 1033)  Temp Source: Oral (06/25/25 1033)  Respirations: 20 (06/25/25 1033)  Height: 5' 8\" (172.7 cm) (06/23/25 1216)  Weight - Scale: 95.3 kg (210 lb 1.6 oz) (06/25/25 0500)  SpO2: 96 % (06/25/25 1033)  Physical Exam  Vitals and nursing note reviewed.   Constitutional:       General: She is not in acute distress.     Appearance: She is well-developed.   HENT:      Head: Normocephalic and atraumatic.      Nose: Nose normal.      Mouth/Throat:      Mouth: Mucous membranes are moist.     Eyes:      Conjunctiva/sclera: Conjunctivae normal.       Cardiovascular:      Rate and Rhythm: Normal rate and regular rhythm. "      Heart sounds: No murmur heard.  Pulmonary:      Effort: Pulmonary effort is normal. No respiratory distress.      Breath sounds: Normal breath sounds.   Abdominal:      Palpations: Abdomen is soft.      Tenderness: There is no abdominal tenderness.     Musculoskeletal:      Cervical back: Neck supple.      Right lower leg: No edema.      Left lower leg: No edema.     Skin:     General: Skin is warm and dry.      Capillary Refill: Capillary refill takes less than 2 seconds.     Neurological:      General: No focal deficit present.      Mental Status: She is alert and oriented to person, place, and time.     Psychiatric:         Mood and Affect: Mood normal.          Discussion with Family: Declined she will    Discharge instructions/Information to patient and family:   See after visit summary for information provided to patient and family.      Provisions for Follow-Up Care:  See after visit summary for information related to follow-up care and any pertinent home health orders.      Mobility at time of Discharge:   Basic Mobility Inpatient Raw Score: 24  JH-HLM Goal: 8: Walk 250 feet or more  JH-HLM Achieved: 7: Walk 25 feet or more  HLM Goal achieved. Continue to encourage appropriate mobility.     Disposition:   Home    Planned Readmission: None    Administrative Statements   Discharge Statement:  I have spent a total time of 45 minutes in caring for this patient on the day of the visit/encounter. >30 minutes of time was spent on: Diagnostic results, Prognosis, Risks and benefits of tx options, Instructions for management, Patient and family education, Importance of tx compliance, Risk factor reductions, Impressions, Counseling / Coordination of care, Documenting in the medical record, Reviewing / ordering tests, medicine, procedures  , and Communicating with other healthcare professionals .    **Please Note: This note may have been constructed using a voice recognition system**

## 2025-06-26 ENCOUNTER — TRANSITIONAL CARE MANAGEMENT (OUTPATIENT)
Dept: FAMILY MEDICINE CLINIC | Facility: CLINIC | Age: 65
End: 2025-06-26

## 2025-06-26 ENCOUNTER — TELEPHONE (OUTPATIENT)
Age: 65
End: 2025-06-26

## 2025-06-26 ENCOUNTER — IN-CLINIC DEVICE VISIT (OUTPATIENT)
Dept: CARDIOLOGY CLINIC | Facility: CLINIC | Age: 65
End: 2025-06-26

## 2025-06-26 ENCOUNTER — PATIENT OUTREACH (OUTPATIENT)
Dept: CASE MANAGEMENT | Facility: OTHER | Age: 65
End: 2025-06-26

## 2025-06-26 DIAGNOSIS — Z95.810 PRESENCE OF AUTOMATIC CARDIOVERTER/DEFIBRILLATOR (AICD): Primary | ICD-10-CM

## 2025-06-26 PROCEDURE — RECHECK: Performed by: INTERNAL MEDICINE

## 2025-06-26 NOTE — PROGRESS NOTES
Outpatient Care Management Note    HRR-HF referral received. Chart review completed.    Patient initially presented to [facility] 6/20/25-6/21/25 and was transferred to [facility] for heart failure evaluation.  Per chart review, patient presented with complaint of worsening shortness of breath and chest tightness associated with weight gain despite Lasix 80 mg BID.  Patient had complications of hypotension during hospital stay.  Entresto dose decreased.  Patient to follow up with Cardiology.  Needs BMP after 1 week.    RN CM will place outreach call to patient for follow up to this hospitalization and to assess for care management needs.    Call placed. No patient answer received. Left VM message and included RN CM contact information and request for return call.    RN CM will schedule a second outreach attempt early next week.

## 2025-06-26 NOTE — PROGRESS NOTES
Outpatient Care Management Note    LOUANN LAY received VM message from patient returning earlier call.  RN CM placed return call and spoke with patient. Introduced self, role, and reason for call.  Patient states that she is still a little tired but feeling much better than she was.    She states that she no longer has any swelling and denies having any shortness of breath or chest pain.  She states that she was just returning from the store where she was able to walk around without any issues.  Patient states that she checked her blood pressure very early this morning, when she woke to use the bathroom, because she was a little lightheaded and states that her BP was 77/70.  She states that then later in the morning when she checked in again after waking at her normal time it was 118/70.  She states that she also checked her blood sugar and weight at that time as well and blood sugar was 98 and weight was 210 lbs.    Following low sodium diet:   yes  Following fluid restriction:  yes, 64 ounces  Hospital discharge weight:   210 lbs, 1.6 oz  Weighing daily:   yes, she has scale and weighs self daily in morning when she wakes up and after using bathroom           Weight log: yes, she is recording her weights  Weight today: 210 lbs  Monitoring symptoms:  yes, patient monitors for symptoms of weight gain of 2-3 lbs overnight or 5 lbs in a week, swelling, shortness of breath, chest pain.  Any current symptoms: patient denies having any symptoms of concern at this time.  When to call provider: reviewed and patient verbalized understanding  Medications reviewed:  patient confirmed that she has her AVS instructions and medication list and taking medications as prescribed. Patient denied need to complete full medication review at this time  Knows name of diuretic:  Yes, Torsemide 20 mg daily  Escalation plan: reviewed and patient verbalized understanding.  HF education reviewed/reinforced including low sodium diet, fluid  restriction, activity, symptoms of decompensation and when and who to call.   Cardiology follow up appointment: 7/15/25  PCP follow up appointment: 7/9/25  Transportation: drives self    Patient agree to care management support and and RN ROSANNE outreach. She is interested in heart failure education material. RN CM will mail to patient.   RN CM will schedule next outreach in one week.

## 2025-06-26 NOTE — PROGRESS NOTES
Results for orders placed or performed in visit on 06/26/25   Cardiac EP device report    Narrative    MDT DUAL ICD (MVP ON) / ACTIVE SYSTEM IS MRI CONDITIONAL  DEVICE INTERROGATED IN THE [place] OFFICE. NON-BILLABLE EQUIPMENT CHECK. BATTERY VOLTAGE ADEQUATE (11.6 YR). AP 24.3%.  <0.1%. ALL LEAD PARAMETERS WITHIN NORMAL LIMITS. NO SIGNIFICANT HIGH RATE EPISODES. OPTI-VOL WITHIN NORMAL LIMITS. BACK MASSAGER (3.7 V) & CONTROLLER TESTED @ [facility]; NO AMPERAGE, RESISTANCE, OR USG INFO PROVIDED. NO DIONI OR MAGNET RESPONSE NOTED. LOW URGENCY DEVICE TONE DEMONSTRATED TO PT. PT CAUTIONED TO DISCONTINUE MASSAGER USE IF MAGNET RESPONSE TONE HEARD. NO PROGRAMMING CHANGES MADE TO DEVICE PARAMETERS. NORMAL DEVICE FUNCTION. [facility]

## 2025-06-26 NOTE — UTILIZATION REVIEW
NOTIFICATION OF ADMISSION DISCHARGE   This is a Notification of Discharge from [facility]. Please be advised that this patient has been discharge from our facility. Below you will find the admission and discharge date and time including the patient’s disposition.   UTILIZATION REVIEW CONTACT:  Utilization Review Assistants  Network Utilization Review Department  Phone: [phone] x carefully listen to the prompts. All voicemails are confidential.  Email: [email or web address]     ADMISSION INFORMATION  PRESENTATION DATE: 6/21/2025  3:36 PM  OBERVATION ADMISSION DATE: N/A  INPATIENT ADMISSION DATE: 6/21/25  3:36 PM   DISCHARGE DATE: 6/25/2025  1:01 PM   DISPOSITION:Home/Self Care    Network Utilization Review Department  ATTENTION: Please call with any questions or concerns to [phone] and carefully listen to the prompts so that you are directed to the right person. All voicemails are confidential.   For Discharge needs, contact Care Management DC Support Team at [phone] opt. 2  Send all requests for admission clinical reviews, approved or denied determinations and any other requests to dedicated fax number below belonging to the campus where the patient is receiving treatment. List of dedicated fax numbers for the Facilities:  FACILITY NAME UR FAX NUMBER   ADMISSION DENIALS (Administrative/Medical Necessity) [phone]   DISCHARGE SUPPORT TEAM ([facility]) [phone]   PARENT CHILD HEALTH (Maternity/NICU/Pediatrics) [phone]   [facility] [phone]   General acute hospital [phone]   [facility] [phone]   [facility] [phone]   [facility] [phone]   [facility] [phone]   [facility] [phone]   [facility] [phone]   [facility]  [facility] [phone]   [facility] [phone]   [facility] [phone]   [facility] [phone]

## 2025-06-26 NOTE — TELEPHONE ENCOUNTER
Pt called, d/c from hospital yesterday. Per note from BILL CHAMPAGNE pt is scheduled to see Dr. Bell 7/15. Pt is on wait list and overbook request has been made for earlier apt w/ Dr. Bell. Pt states Dr. Aguilar told her in the hospital she could also f/u w/ her. Pt is ? If she has sooner apt. She does not. Pt will keep 7/15 apt w/ Dr. Bell pending possible move up.   I did s/w HF clinic and they will be calling pt for f/u call s/p d/c. Pt made aware of same.

## 2025-06-26 NOTE — TELEPHONE ENCOUNTER
Patient called stating she has an appointment scheduled for 7/9/25 to discuss Ozempic but was discharged from hospital yesterday and was advised she needs a hospital follow up. I advised it looks like the appointment on 7/9/25 is scheduled as a hospital follow up along with Ozempic check up. Patient expressed thanks.

## 2025-06-27 ENCOUNTER — TELEPHONE (OUTPATIENT)
Dept: CARDIOLOGY CLINIC | Facility: CLINIC | Age: 65
End: 2025-06-27

## 2025-06-27 ENCOUNTER — TELEPHONE (OUTPATIENT)
Age: 65
End: 2025-06-27

## 2025-06-27 NOTE — TELEPHONE ENCOUNTER
Caller: Vesna    Doctor: Dr. Mcrae    Reason for call: wants to know if she is able to get in pool this weekend.  Will not be swimming just sitting on a step.  Incision is healed knows it is to early for the hot tub but wants to know about a pool.      Please advise patient  either call back or message in [employer]    Call back#: [phone]

## 2025-06-27 NOTE — TELEPHONE ENCOUNTER
Called and spoke with pt and relayed BRANDEN Beltran's message, she stated understanding, no further questions.

## 2025-06-27 NOTE — TELEPHONE ENCOUNTER
Symptoms:  -leg swelling []  -abdominal bloating, distension, pants fitting tighter []    -loss of appetite, feeling full sooner than usual []  -worsening shortness of breath/TAMAYO, activity intolerance[]  -difficulty lying flat, waking up a night feeling breathless, using more pillows, sleeping in a recliner []  -palpitations []  -chest pain/pressure []  -new or worsening cough []  -unusual fatigue []    Comments:Denies        Weight:   -weighs self daily [x]  -dry/target weight _____  -today's weight __210___  -understands what to do for rapid weight gain, ie 3lbs in 24 hours or 5 lbs in one week[x]    Comments: educaTED ON        Diet:   -consuming any high sodium foods (canned soups, lunch meats, fast food/take out, snack food, prepared foods, sport drinks) yes[]no[x]  - fluid consumed per day-     64      oz   -2g (2000 mg) Sodium, 2L (2000 ml, around 60-64 oz) fluid restriction] reinforced [x]      Comments:EDUCATED ON          Medications:  -med list reviewed [x]   -missed doses or taking a different dose than prescribed?  yes[]no[x]  -still urinates well on current diuretic ? yes[x]no[]  -using O2 as directed? yes[]no[]    Comments:      Home vital signs: (if available)  BP ____running low  HR____  Pulse ox____    Recent labs:   BMP/CMP -   BNP, NT Pro BNP-  CBC-    Device check:   Arrhythmia burden ?     Optivol/Corvue crossed ?      Other possible triggers ?:none  Recent viral symptoms/infection []  NSAID use []  Steroids []  Anemia []  COPD/hypoxia []  Not using CPAP/BiPAP []    Comments:Self-management/education and teach back:  Primary learner: self  Following low sodium diet: Y  Following fluid restriction:Y  Hospital discharge weight: 210  Weighing daily:       y     Recording:y  1st home weight       Weight today:210  Monitoring symptoms: y  Any current symptoms: hypotension  Entresto on hold  Knows when to call provider: educated on  Medication reviewed and taking all as prescribed:y  Knows name of  diuretic:y  Escalation plan: n  HF education reviewed/reinforced including low sodium diet, 64 oz fluid restriction, activity, symptoms of decompensation and when and who to call.     Care Coordination:  Aware of cardiology follow up appointment: y  Aware of PCP follow up appointment:y  Transportation:y  Social Support:y  Insurance/financial concerns:n  Home health care: n  Health literacy:good  Engagement:good  Personal Goal:stay healthy and get BP under control  Additional comments:she is fine with f/u calls

## 2025-06-27 NOTE — TELEPHONE ENCOUNTER
Spoke with pt. Her Bp has been running low. It is 77/71, 80/65 st times it is sbp 138 but mostly under 100.  Advised to take in fluid if bp SBP under 100 and she is dizzy or lightheaded. Get up slowly and move slower.She gets lightheaded when getting up. Wt is stable at 210 lbs. Feels good otherwise. Denies any CHF symptoms. Urinating enough and drinking her 64 oz daily.    She states she has not had any back pain in 2-3 weeks and her Bp used to run low when she had no back issues.     She was out to lunch today and window shopping. Advised she does not over do it.    Taking Entresto 24-26 mg bid               Imdur 60 mg qd              Torsemide 20 mg qd              Toprol-xl 50 mg at bedtime              Jardiance 10 mg qd               Potassium 20 meq qd.    Please advise

## 2025-06-30 ENCOUNTER — TELEPHONE (OUTPATIENT)
Dept: CARDIOLOGY CLINIC | Facility: CLINIC | Age: 65
End: 2025-06-30

## 2025-06-30 ENCOUNTER — PATIENT OUTREACH (OUTPATIENT)
Dept: CASE MANAGEMENT | Facility: OTHER | Age: 65
End: 2025-06-30

## 2025-06-30 RX ORDER — FUROSEMIDE 20 MG/1
TABLET ORAL
COMMUNITY
Start: 2025-06-13 | End: 2025-07-03

## 2025-06-30 NOTE — TELEPHONE ENCOUNTER
NASRIN-  Spoke with pt. She researched all her medications and decide to stop lyrica and decrease the imdur to 30 mg qd from 60 mg.  Her bp today 108/77. Denies any other issues.    She will call if SBP drops below 100 consistently.

## 2025-06-30 NOTE — PROGRESS NOTES
Outpatient Care Management Note    CM outreach due reminder: Chart reviewed.    During last RN CM outreach call, patient verbalized interest in receiving hear failure education material.    RN CM sending education material now via US mail per patient request.  Information sent included:   [facility] Heart Talk: Living with Heart Failure booklet   Important activities to do at home to manage my heart failure.    RN CM has outreach call scheduled for later this week.     Nursing Home

## 2025-07-01 ENCOUNTER — APPOINTMENT (EMERGENCY)
Dept: CT IMAGING | Facility: HOSPITAL | Age: 65
End: 2025-07-01
Payer: COMMERCIAL

## 2025-07-01 ENCOUNTER — APPOINTMENT (EMERGENCY)
Dept: RADIOLOGY | Facility: HOSPITAL | Age: 65
End: 2025-07-01
Payer: COMMERCIAL

## 2025-07-01 ENCOUNTER — NURSE TRIAGE (OUTPATIENT)
Age: 65
End: 2025-07-01

## 2025-07-01 ENCOUNTER — HOSPITAL ENCOUNTER (EMERGENCY)
Facility: HOSPITAL | Age: 65
Discharge: HOME | End: 2025-07-01
Attending: EMERGENCY MEDICINE
Payer: COMMERCIAL

## 2025-07-01 VITALS
RESPIRATION RATE: 17 BRPM | TEMPERATURE: 96.8 F | BODY MASS INDEX: 31.93 KG/M2 | OXYGEN SATURATION: 98 % | WEIGHT: 210 LBS | DIASTOLIC BLOOD PRESSURE: 71 MMHG | SYSTOLIC BLOOD PRESSURE: 128 MMHG | HEART RATE: 69 BPM

## 2025-07-01 DIAGNOSIS — R07.9 CHEST PAIN, UNSPECIFIED TYPE: ICD-10-CM

## 2025-07-01 DIAGNOSIS — R00.2 PALPITATIONS: Primary | ICD-10-CM

## 2025-07-01 LAB
2HR DELTA HS TROPONIN: 1 NG/L
ALBUMIN SERPL BCG-MCNC: 4.3 G/DL (ref 3.5–5)
ALP SERPL-CCNC: 51 U/L (ref 34–104)
ALT SERPL W P-5'-P-CCNC: 12 U/L (ref 7–52)
ANION GAP SERPL CALCULATED.3IONS-SCNC: 9 MMOL/L (ref 4–13)
APTT PPP: 31 SECONDS (ref 23–34)
AST SERPL W P-5'-P-CCNC: 28 U/L (ref 13–39)
ATRIAL RATE: 70 BPM
BASOPHILS # BLD AUTO: 0.07 THOUSANDS/ÂΜL (ref 0–0.1)
BASOPHILS NFR BLD AUTO: 1 % (ref 0–1)
BILIRUB SERPL-MCNC: 0.7 MG/DL (ref 0.2–1)
BNP SERPL-MCNC: 61 PG/ML (ref 0–100)
BUN SERPL-MCNC: 13 MG/DL (ref 5–25)
CALCIUM SERPL-MCNC: 9.9 MG/DL (ref 8.4–10.2)
CARDIAC TROPONIN I PNL SERPL HS: 4 NG/L (ref ?–50)
CARDIAC TROPONIN I PNL SERPL HS: 5 NG/L (ref ?–50)
CHLORIDE SERPL-SCNC: 104 MMOL/L (ref 96–108)
CO2 SERPL-SCNC: 26 MMOL/L (ref 21–32)
CREAT SERPL-MCNC: 0.86 MG/DL (ref 0.6–1.3)
D DIMER PPP FEU-MCNC: 0.79 UG/ML FEU
EOSINOPHIL # BLD AUTO: 0.3 THOUSAND/ÂΜL (ref 0–0.61)
EOSINOPHIL NFR BLD AUTO: 4 % (ref 0–6)
ERYTHROCYTE [DISTWIDTH] IN BLOOD BY AUTOMATED COUNT: 17.9 % (ref 11.6–15.1)
GFR SERPL CREATININE-BSD FRML MDRD: 70 ML/MIN/1.73SQ M
GLUCOSE SERPL-MCNC: 116 MG/DL (ref 65–140)
HCT VFR BLD AUTO: 39.3 % (ref 34.8–46.1)
HGB BLD-MCNC: 11.5 G/DL (ref 11.5–15.4)
IMM GRANULOCYTES # BLD AUTO: 0.03 THOUSAND/UL (ref 0–0.2)
IMM GRANULOCYTES NFR BLD AUTO: 0 % (ref 0–2)
INR PPP: 1.13 (ref 0.85–1.19)
LYMPHOCYTES # BLD AUTO: 1.78 THOUSANDS/ÂΜL (ref 0.6–4.47)
LYMPHOCYTES NFR BLD AUTO: 21 % (ref 14–44)
MCH RBC QN AUTO: 24.6 PG (ref 26.8–34.3)
MCHC RBC AUTO-ENTMCNC: 29.3 G/DL (ref 31.4–37.4)
MCV RBC AUTO: 84 FL (ref 82–98)
MONOCYTES # BLD AUTO: 0.63 THOUSAND/ÂΜL (ref 0.17–1.22)
MONOCYTES NFR BLD AUTO: 7 % (ref 4–12)
NEUTROPHILS # BLD AUTO: 5.83 THOUSANDS/ÂΜL (ref 1.85–7.62)
NEUTS SEG NFR BLD AUTO: 67 % (ref 43–75)
NRBC BLD AUTO-RTO: 0 /100 WBCS
P AXIS: 40 DEGREES
PLATELET # BLD AUTO: 321 THOUSANDS/UL (ref 149–390)
PMV BLD AUTO: 10.1 FL (ref 8.9–12.7)
POTASSIUM SERPL-SCNC: 4.3 MMOL/L (ref 3.5–5.3)
PR INTERVAL: 216 MS
PROT SERPL-MCNC: 7.2 G/DL (ref 6.4–8.4)
PROTHROMBIN TIME: 14.9 SECONDS (ref 12.3–15)
QRS AXIS: 54 DEGREES
QRSD INTERVAL: 74 MS
QT INTERVAL: 406 MS
QTC INTERVAL: 438 MS
RBC # BLD AUTO: 4.68 MILLION/UL (ref 3.81–5.12)
SODIUM SERPL-SCNC: 139 MMOL/L (ref 135–147)
T WAVE AXIS: 89 DEGREES
VENTRICULAR RATE: 70 BPM
WBC # BLD AUTO: 8.64 THOUSAND/UL (ref 4.31–10.16)

## 2025-07-01 PROCEDURE — 85025 COMPLETE CBC W/AUTO DIFF WBC: CPT | Performed by: EMERGENCY MEDICINE

## 2025-07-01 PROCEDURE — 99285 EMERGENCY DEPT VISIT HI MDM: CPT | Performed by: EMERGENCY MEDICINE

## 2025-07-01 PROCEDURE — 71275 CT ANGIOGRAPHY CHEST: CPT

## 2025-07-01 PROCEDURE — 84484 ASSAY OF TROPONIN QUANT: CPT | Performed by: EMERGENCY MEDICINE

## 2025-07-01 PROCEDURE — 83880 ASSAY OF NATRIURETIC PEPTIDE: CPT | Performed by: EMERGENCY MEDICINE

## 2025-07-01 PROCEDURE — 85730 THROMBOPLASTIN TIME PARTIAL: CPT | Performed by: EMERGENCY MEDICINE

## 2025-07-01 PROCEDURE — 36415 COLL VENOUS BLD VENIPUNCTURE: CPT | Performed by: EMERGENCY MEDICINE

## 2025-07-01 PROCEDURE — 93010 ELECTROCARDIOGRAM REPORT: CPT | Performed by: INTERNAL MEDICINE

## 2025-07-01 PROCEDURE — 93005 ELECTROCARDIOGRAM TRACING: CPT

## 2025-07-01 PROCEDURE — 71045 X-RAY EXAM CHEST 1 VIEW: CPT

## 2025-07-01 PROCEDURE — 80053 COMPREHEN METABOLIC PANEL: CPT | Performed by: EMERGENCY MEDICINE

## 2025-07-01 PROCEDURE — 85610 PROTHROMBIN TIME: CPT | Performed by: EMERGENCY MEDICINE

## 2025-07-01 PROCEDURE — 85379 FIBRIN DEGRADATION QUANT: CPT | Performed by: EMERGENCY MEDICINE

## 2025-07-01 PROCEDURE — 99285 EMERGENCY DEPT VISIT HI MDM: CPT

## 2025-07-01 RX ADMIN — IOHEXOL 50 ML: 350 INJECTION, SOLUTION INTRAVENOUS at 11:04

## 2025-07-01 NOTE — ED NOTES
Radiology at bedside for xray.     Melissa Lenz RN  07/01/25 0917       Melissa Lenz RN  07/01/25 0917

## 2025-07-01 NOTE — DISCHARGE INSTRUCTIONS
You were seen in the emergency department for chest pain, your CT scan, blood work, and interrogation were unremarkable.  We provided you with an ambulatory referral to cardiology, please call their office to schedule your appointment.  They are expecting your phone call.  If your symptoms worsen or persist, please return to the emergency department immediately.

## 2025-07-01 NOTE — TELEPHONE ENCOUNTER
Pt went to ER they sent her home and told her to drink . She maybe a little dehydrated. Advised the same. Advised to rest and try and have dry toast to see if she can hold that down.They interrogated her devise in ER and it showed nothing.    Advised if she feels worse to go back to ER. Verbally understood.    I will f/u with her tomorrow.

## 2025-07-01 NOTE — TELEPHONE ENCOUNTER
"REASON FOR CONVERSATION: Palpitations  Patient calling, states she started having palpitations with heart rate in 100s-110s last night while grocery shopping. Patient broke out in a sweat and became nauseous. Symptoms persisted off and on throughout the night and she vomited a few times. Pt denies chest pain, but states she doesn't feel well and the last time this happened was when she had a heart attack.    SYMPTOMS: palpitations, tachycardia, sweating, nausea, vomiting.    OTHER HEALTH INFORMATION: Patient sent EKG tracings from [facility] to clinical yesterday.     PROTOCOL DISPOSITION: Go to ED/[facility] Now (Or to Office with PCP Approval)    CARE ADVICE PROVIDED: Advised patient to go to ED. Patient verbalized understanding. She will go to ED at [facility] FOLLOW-UP: FYI    Reason for Disposition   Patient sounds very sick or weak to the triager    Answer Assessment - Initial Assessment Questions  1. DESCRIPTION: \"Please describe your heart rate or heartbeat that you are having\" (e.g., fast/slow, regular/irregular, skipped or extra beats, \"palpitations\")      palpitations  2. ONSET: \"When did it start?\" (e.g., minutes, hours, days)       Last night  3. DURATION: \"How long does it last\" (e.g., seconds, minutes, hours)      All night   4. PATTERN \"Does it come and go, or has it been constant since it started?\"  \"Does it get worse with exertion?\"   \"Are you feeling it now?\"      Off and on    6. HEART RATE: \"Can you tell me your heart rate?\" \"How many beats in 15 seconds?\"  Note: Not all patients can do this.        80s-110  7. RECURRENT SYMPTOM: \"Have you ever had this before?\" If Yes, ask: \"When was the last time?\" and \"What happened that time?\"       denies  8. CAUSE: \"What do you think is causing the palpitations?\"      unsure  9. CARDIAC HISTORY: \"Do you have any history of heart disease?\" (e.g., heart attack, angina, bypass surgery, angioplasty, arrhythmia)       Heart attack, atrial flutter, " "AICD  10. OTHER SYMPTOMS: \"Do you have any other symptoms?\" (e.g., dizziness, chest pain, sweating, difficulty breathing)        Nausea, sweating    Protocols used: Heart Rate and Heartbeat Questions-Adult-[place]    "

## 2025-07-01 NOTE — ED PROVIDER NOTES
Time reflects when diagnosis was documented in both MDM as applicable and the Disposition within this note       Time User Action Codes Description Comment    7/1/2025 12:34 PM Robson Mueller Add [R00.2] Palpitations     7/1/2025 12:34 PM Robson Mueller Add [R07.9] Chest pain, unspecified type           ED Disposition       ED Disposition   Discharge    Condition   Stable    Date/Time   Tue Jul 1, 2025 12:34 PM    Comment   --             Assessment & Plan       Medical Decision Making  65-year-old female presents to the emergency department with complaint of chest discomfort, palpitations, differential diagnosis include ACS, electrode abnormality, congestive heart failure pulmonary embolism, electrolyte abnormality, fluid overload, acid reflux, indigestion, dehydration, will perform CT PE study, cardiopulmonary workup, interrogate pacemaker, and reassess.    Discussed all results with the patient.  She will follow-up with her cardiology team as scheduled.  Interrogation unremarkable, no signs of V-fib, V. tach etc., strict return precautions given.  Patient or stands and agrees with treatment plan    Amount and/or Complexity of Data Reviewed  Labs: ordered.  Radiology: ordered.    Risk  Prescription drug management.        ED Course as of 07/01/25 1239   Tue Jul 01, 2025   1021 Had episode of palpitations, chest pressure last night, felt nauseated, went to sleep, this morning, started again, patient felt nauseous, broke out into a sweat, had chest pressure, localized to chest, 2 nitro taken at home, patient arrives asymptomatic.  EKG reviewed with Dr. Reji pickard, negative for STEMI, EKG unchanged from previous.       Medications   iohexol (OMNIPAQUE) 350 MG/ML injection (MULTI-DOSE) 50 mL (50 mL Intravenous Given 7/1/25 1104)       ED Risk Strat Scores   HEART Risk Score      Flowsheet Row Most Recent Value   Heart Score Risk Calculator    History 0 Filed at: 07/01/2025 1235   ECG 1 Filed at: 07/01/2025 1235    Age 2 Filed at: 07/01/2025 1235   Risk Factors 2 Filed at: 07/01/2025 1235   Troponin 0 Filed at: 07/01/2025 1235   HEART Score 5 Filed at: 07/01/2025 1235          HEART Risk Score      Flowsheet Row Most Recent Value   Heart Score Risk Calculator    History 0 Filed at: 07/01/2025 1235   ECG 1 Filed at: 07/01/2025 1235   Age 2 Filed at: 07/01/2025 1235   Risk Factors 2 Filed at: 07/01/2025 1235   Troponin 0 Filed at: 07/01/2025 1235   HEART Score 5 Filed at: 07/01/2025 1235                      No data recorded        SBIRT 20yo+      Flowsheet Row Most Recent Value   Initial Alcohol Screen: US AUDIT-C     1. How often do you have a drink containing alcohol? 0 Filed at: 07/01/2025 0858   2. How many drinks containing alcohol do you have on a typical day you are drinking?  0 Filed at: 07/01/2025 0858   3b. FEMALE Any Age, or MALE 65+: How often do you have 4 or more drinks on one occassion? 0 Filed at: 07/01/2025 0858   Audit-C Score 0 Filed at: 07/01/2025 0858   CHRIS: How many times in the past year have you...    Used an illegal drug or used a prescription medication for non-medical reasons? Never Filed at: 07/01/2025 0858                            History of Present Illness       Chief Complaint   Patient presents with    Palpitations     Patient presents to the ED with complaints of chest tightness and palpitations that began yesterday. The patient reports having a pacemaker. Patient also reports taking 2 Nitro prior to arrival.        Past Medical History[1]   Past Surgical History[2]   Family History[3]   Social History[4]   E-Cigarette/Vaping    E-Cigarette Use Never User       E-Cigarette/Vaping Substances    Nicotine No     THC No     CBD No     Flavoring No     Other No     Unknown No       I have reviewed and agree with the history as documented.     65-year-old female with past medical history listed below presents to the emergency department with complaint of chest tightness and palpitations that  occurred last night while she was at the grocery store.  Patient states she went home, went to bed, and woke up with an additional episode of chest discomfort associated with some palpitations.  Patient took 2 nitroglycerin, and her symptoms resolved.  Patient has been checking her weight daily, and has been taking her diuretics as prescribed.  Patient denies any chills, fevers.  Patient states that she did have a cold sweat.  She denies any chest pain, or shortness of breath at this time.  Patient denies any GI or  complaints.  On initial evaluation, patient's EKG was unchanged from previous, reviewed it with cardiology in house here, Dr. Mendoza who confirms.       Palpitations  Associated symptoms: chest pain    Associated symptoms: no back pain, no cough, no shortness of breath and no vomiting        Review of Systems   Constitutional:  Negative for chills and fever.   HENT:  Negative for ear pain and sore throat.    Eyes:  Negative for pain and visual disturbance.   Respiratory:  Negative for cough and shortness of breath.    Cardiovascular:  Positive for chest pain and palpitations.   Gastrointestinal:  Negative for abdominal pain and vomiting.   Genitourinary:  Negative for dysuria and hematuria.   Musculoskeletal:  Negative for arthralgias and back pain.   Skin:  Negative for color change and rash.   Neurological:  Negative for seizures and syncope.   All other systems reviewed and are negative.          Objective       ED Triage Vitals [07/01/25 0858]   Temperature Pulse Blood Pressure Respirations SpO2 Patient Position - Orthostatic VS   (!) 96.8 °F (36 °C) 70 132/79 18 99 % Lying      Temp Source Heart Rate Source BP Location FiO2 (%) Pain Score    Temporal Monitor Left arm -- 3      Vitals      Date and Time Temp Pulse SpO2 Resp BP Pain Score FACES Pain Rating User   07/01/25 1230 -- 69 98 % 17 128/71 -- -- SB   07/01/25 1200 -- 69 97 % 17 122/65 -- -- SB   07/01/25 1100 -- 69 98 % 20 126/73 -- -- SB    07/01/25 1051 -- 69 -- 20 126/73 -- -- AT   07/01/25 1049 -- 69 -- 20 125/64 -- -- AT   07/01/25 1045 -- 94 100 % 20 -- -- -- MB   07/01/25 0930 -- 69 96 % 21 120/65 -- -- MB   07/01/25 0858 96.8 °F (36 °C) 70 99 % 18 132/79 3 -- RR            Physical Exam  Vitals and nursing note reviewed.   Constitutional:       General: She is not in acute distress.     Appearance: She is well-developed.   HENT:      Head: Normocephalic and atraumatic.     Eyes:      Conjunctiva/sclera: Conjunctivae normal.       Cardiovascular:      Rate and Rhythm: Normal rate and regular rhythm.   Pulmonary:      Effort: Pulmonary effort is normal. No respiratory distress.      Breath sounds: Normal breath sounds.   Abdominal:      Palpations: Abdomen is soft.      Tenderness: There is no abdominal tenderness.     Musculoskeletal:         General: No swelling.      Cervical back: Neck supple.     Skin:     General: Skin is warm and dry.      Capillary Refill: Capillary refill takes less than 2 seconds.     Neurological:      Mental Status: She is alert.     Psychiatric:         Mood and Affect: Mood normal.         Results Reviewed       Procedure Component Value Units Date/Time    HS Troponin I 2hr [[id number]]  (Normal) Collected: 07/01/25 1151    Lab Status: Final result Specimen: Blood from Arm, Right Updated: 07/01/25 1222     hs TnI 2hr 5 ng/L      Delta 2hr hsTnI 1 ng/L     HS Troponin I 4hr [[id number]]     Lab Status: No result Specimen: Blood     D-dimer, quantitative [[id number]]  (Abnormal) Collected: 07/01/25 1003    Lab Status: Final result Specimen: Blood from Arm, Left Updated: 07/01/25 1027     D-Dimer, Quant 0.79 ug/ml FEU     Narrative:      In the evaluation for possible pulmonary embolism, in the appropriate (Well's Score of 4 or less) patient, the age adjusted d-dimer cutoff for this patient can be calculated as:    Age x 0.01 (in ug/mL) for Age-adjusted D-dimer exclusion threshold for a patient over 50 years.    APTT  [[id number]]  (Normal) Collected: 07/01/25 1003    Lab Status: Final result Specimen: Blood from Arm, Left Updated: 07/01/25 1022     PTT 31 seconds     Protime-INR [[id number]]  (Normal) Collected: 07/01/25 1003    Lab Status: Final result Specimen: Blood from Arm, Left Updated: 07/01/25 1022     Protime 14.9 seconds      INR 1.13    Narrative:      INR Therapeutic Range    Indication                                             INR Range      Atrial Fibrillation                                               2.0-3.0  Hypercoagulable State                                    2.0.2.3  Left Ventricular Asist Device                            2.0-3.0  Mechanical Heart Valve                                  -    Aortic(with afib, MI, embolism, HF, LA enlargement,    and/or coagulopathy)                                     2.0-3.0 (2.5-3.5)     Mitral                                                             2.5-3.5  Prosthetic/Bioprosthetic Heart Valve               2.0-3.0  Venous thromboembolism (VTE: VT, PE        2.0-3.0    B-Type Natriuretic Peptide(BNP) [[id number]]  (Normal) Collected: 07/01/25 0944    Lab Status: Final result Specimen: Blood from Arm, Right Updated: 07/01/25 1015     BNP 61 pg/mL     HS Troponin 0hr (reflex protocol) [[id number]]  (Normal) Collected: 07/01/25 0944    Lab Status: Final result Specimen: Blood from Arm, Right Updated: 07/01/25 1013     hs TnI 0hr 4 ng/L     Comprehensive metabolic panel [[id number]] Collected: 07/01/25 0944    Lab Status: Final result Specimen: Blood from Arm, Right Updated: 07/01/25 1010     Sodium 139 mmol/L      Potassium 4.3 mmol/L      Chloride 104 mmol/L      CO2 26 mmol/L      ANION GAP 9 mmol/L      BUN 13 mg/dL      Creatinine 0.86 mg/dL      Glucose 116 mg/dL      Calcium 9.9 mg/dL      AST 28 U/L      ALT 12 U/L      Alkaline Phosphatase 51 U/L      Total Protein 7.2 g/dL      Albumin 4.3 g/dL      Total Bilirubin 0.70 mg/dL      eGFR 70 ml/min/1.73sq m      Narrative:      National Kidney Disease Foundation guidelines for Chronic Kidney Disease (CKD):     Stage 1 with normal or high GFR (GFR > 90 mL/min/1.73 square meters)    Stage 2 Mild CKD (GFR = 60-89 mL/min/1.73 square meters)    Stage 3A Moderate CKD (GFR = 45-59 mL/min/1.73 square meters)    Stage 3B Moderate CKD (GFR = 30-44 mL/min/1.73 square meters)    Stage 4 Severe CKD (GFR = 15-29 mL/min/1.73 square meters)    Stage 5 End Stage CKD (GFR <15 mL/min/1.73 square meters)  Note: GFR calculation is accurate only with a steady state creatinine    CBC and differential [[id number]]  (Abnormal) Collected: 07/01/25 0944    Lab Status: Final result Specimen: Blood from Arm, Right Updated: 07/01/25 0950     WBC 8.64 Thousand/uL      RBC 4.68 Million/uL      Hemoglobin 11.5 g/dL      Hematocrit 39.3 %      MCV 84 fL      MCH 24.6 pg      MCHC 29.3 g/dL      RDW 17.9 %      MPV 10.1 fL      Platelets 321 Thousands/uL      nRBC 0 /100 WBCs      Segmented % 67 %      Immature Grans % 0 %      Lymphocytes % 21 %      Monocytes % 7 %      Eosinophils Relative 4 %      Basophils Relative 1 %      Absolute Neutrophils 5.83 Thousands/µL      Absolute Immature Grans 0.03 Thousand/uL      Absolute Lymphocytes 1.78 Thousands/µL      Absolute Monocytes 0.63 Thousand/µL      Eosinophils Absolute 0.30 Thousand/µL      Basophils Absolute 0.07 Thousands/µL             CTA chest pe study   Final Interpretation by Dangelo Gerard MD (07/01 1213)      No pulmonary embolus.      No evidence of acute thoracic process.      Chronic findings and negatives as above.                  Computerized Assisted Algorithm (CAA) may have aided analysis of applicable images.      Workstation performed: [id number]         X-ray chest 1 view portable    (Results Pending)       Procedures    ED Medication and Procedure Management   Prior to Admission Medications   Prescriptions Last Dose Informant Patient Reported? Taking?   Empagliflozin  (Jardiance) 10 MG TABS tablet   No No   Sig: Take 1 tablet (10 mg total) by mouth every morning   HYDROcodone-acetaminophen (NORCO) 5-325 mg per tablet  Self No No   Sig: Take 1 tablet by mouth every 8 (eight) hours as needed for pain May cause drowsiness and dizziness. This medication does contain tylenol/acetaminophen, so will need to limit any supplemental tylenol intake. Do not exceed 3000 mg (3g) of tylenol/acetaminophen every 24 hours. Max Daily Amount: 3 tablets   Multiple Vitamin (multivitamin) tablet  Self Yes No   Sig: Take 1 tablet by mouth in the morning.   acetaminophen (TYLENOL) 325 mg tablet  Self No No   Sig: Take 2 tablets (650 mg total) by mouth every 6 (six) hours as needed for mild pain   albuterol (ProAir HFA) 90 mcg/act inhaler   No No   Sig: Inhale 2 puffs every 6 (six) hours as needed for wheezing   apixaban (ELIQUIS) 5 mg  Self No No   Sig: Take 1 tablet (5 mg total) by mouth 2 (two) times a day   atorvastatin (LIPITOR) 80 mg tablet  Self No No   Sig: TAKE 1 TABLET BY MOUTH EVERY DAY IN THE EVENING   clopidogrel (PLAVIX) 75 mg tablet  Self No No   Sig: Take 1 tablet (75 mg total) by mouth daily   diphenhydrAMINE (BENADRYL) 25 mg capsule  Self Yes No   Sig: Take 25 mg by mouth every 6 (six) hours as needed for itching   dofetilide (TIKOSYN) 125 mcg capsule  Self No No   Sig: Take 1 capsule (125 mcg total) by mouth every 12 (twelve) hours   ergocalciferol (VITAMIN D2) 50,000 units   No No   Sig: Take 1 capsule (50,000 Units total) by mouth once a week   ferrous gluconate (FERGON) 324 mg tablet   No No   Sig: Take 1 tablet (324 mg total) by mouth every other day   fluticasone (FLONASE) 50 mcg/act nasal spray  Self No No   Si spray into each nostril daily   furosemide (LASIX) 20 mg tablet   Yes No   Sig: TAKE 1 TABLET (20 MG TOTAL) BY MOUTH DAILY ALTERNATING EVERY OTHER DAY WITH 1 TABLET DAILY   isosorbide mononitrate (IMDUR) 60 mg 24 hr tablet  Self No No   Sig: Take 1 tablet (60 mg total)  by mouth daily   Patient taking differently: Take 30 mg by mouth daily   levothyroxine (Synthroid) 75 mcg tablet   No No   Sig: Take 1 tablet (75 mcg total) by mouth daily   meclizine (ANTIVERT) 25 mg tablet  Self No No   Sig: Take 1 tablet (25 mg total) by mouth every 8 (eight) hours as needed for dizziness   metoprolol succinate (TOPROL-XL) 25 mg 24 hr tablet   No No   Sig: Take 2 tablets (50 mg total) by mouth daily at bedtime   pantoprazole (PROTONIX) 40 mg tablet  Self No No   Sig: TAKE 1 TABLET BY MOUTH 2 TIMES A DAY BEFORE MEALS.   potassium chloride (Klor-Con M20) 20 mEq tablet  Self No No   Sig: Take 1 tablet (20 mEq total) by mouth if needed (on lasix days)   pregabalin (LYRICA) 75 mg capsule  Self No No   Sig: Take 1 capsule (75 mg total) by mouth 3 (three) times a day   Patient not taking: Reported on 6/30/2025   sacubitril-valsartan (ENTRESTO) 24-26 MG TABS   No No   Sig: Take 1 tablet by mouth in the morning and 1 tablet before bedtime.   semaglutide, 0.25 or 0.5 mg/dose, (Ozempic, 0.25 or 0.5 MG/DOSE,) 2 mg/3 mL injection pen  Self No No   Sig: Inject 0.75 mL (0.5 mg total) under the skin every 7 days 0.25 mg under the skin every 7 days for 4 doses (28 days), THEN 0.5 mg under the skin every 7 days   torsemide (DEMADEX) 20 mg tablet   No No   Sig: Take 1 tablet (20 mg total) by mouth daily      Facility-Administered Medications: None     Patient's Medications   Discharge Prescriptions    No medications on file       ED SEPSIS DOCUMENTATION   Time reflects when diagnosis was documented in both MDM as applicable and the Disposition within this note       Time User Action Codes Description Comment    7/1/2025 12:34 PM Robson Mueller Add [R00.2] Palpitations     7/1/2025 12:34 PM Robson Mueller Add [R07.9] Chest pain, unspecified type                    [1]   Past Medical History:  Diagnosis Date    Acute respiratory insufficiency 03/22/2024    Allergic     Anxiety 4/24    Arrhythmia 3/22/24    Atrial  fibrillation (HCC) 3/22/24    Back pain 2022    Bradycardia 3/22/24    BRCA1 negative     BRCA2 negative     Breast cyst 2000    Chronic HFrEF (heart failure with reduced ejection fraction) ([facility])     Clotting disorder (HCC) 4/1/24    Coronary artery disease     COVID-19 virus infection 11/28/2022    Depression 4/24    Diabetes mellitus ([facility])     Disease of thyroid gland 4/09/2024    Environmental and seasonal allergies 1/1/1960    GERD (gastroesophageal reflux disease) 10/09    GI (gastrointestinal bleed) 4/1/24    Heart disease 3/24    History of placement of internal cardiac defibrillator 3/27/24    Hyperlipidemia     Hypoglycemia     ICD (implantable cardioverter-defibrillator) in place     Ischemic cardiomyopathy     Lactic acidosis 03/22/2024    Migraine 1980    Morning headache 1980    Myocardial infarction ([facility]) 3/22/2024    Nausea & vomiting 04/03/2025    Otitis media 1966    Pacemaker 3/27/24    Peptic ulceration 4/2024    Seasonal allergies     Sinus problem 1970    ST elevation myocardial infarction involving left anterior descending (LAD) coronary artery ([facility]) 03/22/2024    Tobacco abuse     URI (upper respiratory infection) 06/21/2025    Visual impairment 1967   [2]   Past Surgical History:  Procedure Laterality Date    ADENOIDECTOMY      APPENDECTOMY      APPENDECTOMY LAPAROSCOPIC N/A 05/08/2024    Procedure: APPENDECTOMY LAPAROSCOPIC;  Surgeon: Lauryn Ullrich, DO;  Location: [facility];  Service: General    BACK SURGERY  8/23    BACK SURGERY      BREAST EXCISIONAL BIOPSY Right 09/2000    cyst removed- benign    CARDIAC CATHETERIZATION N/A 03/22/2024    Procedure: Cardiac pci;  Surgeon: Gabriel Myers MD;  [facility];  Service: Cardiology    CARDIAC CATHETERIZATION N/A 03/22/2024    Procedure: Cardiac Coronary Angiogram;  Surgeon: Gabriel Myers MD;  [facility];  Service: Cardiology    CARDIAC CATHETERIZATION N/A 03/22/2024    Procedure: Cardiac PCI AMI;  Surgeon:  Gabriel Myers MD;  [facility];  Service: Cardiology    CARDIAC CATHETERIZATION N/A 2024    Procedure: Cardiac IVUS;  Surgeon: Gabriel Myers MD;  [facility];  Service: Cardiology    CARDIAC DEFIBRILLATOR PLACEMENT      CARDIAC ELECTROPHYSIOLOGY PROCEDURE N/A 2024    Procedure: Cardiac icd implant;  Surgeon: Jeffy Lama MD;  [facility];  Service: Cardiology     SECTION      COLONOSCOPY      ECTOPIC PREGNANCY SURGERY      INSERT / REPLACE / REMOVE PACEMAKER      LUMBAR FUSION Bilateral 2025    Procedure: Navigated revision L3-S1 laminectomy/decompression, revision L3-S1 posterior instrumented spinal fusion with TLIF;  Surgeon: Charles Mcrae MD;  Location: [facility];  Service: Orthopedics    MASS EXCISION Left 10/18/2024    Procedure: EXCISION BIOPSY TISSUE LESION/MASS UPPER EXTREMITY - Left index finger;  Surgeon: Leandro Campos MD;  Location:  MAIN OR;  Service: Orthopedics    SEPTOPLASTY      SPINE SURGERY  23    TONSILLECTOMY      TONSILLECTOMY AND ADENOIDECTOMY  1964    TRIGGER POINT INJECTION      UPPER GASTROINTESTINAL ENDOSCOPY  24   [3]   Family History  Adopted: Yes   Problem Relation Name Age of Onset    Depression Mother Unknown     Sleep apnea Mother Unknown     Heart disease Father Unknown     Snoring Father Unknown     Restless legs syndrome Father Unknown     OCD Daughter     [4]   Social History  Tobacco Use    Smoking status: Former     Current packs/day: 0.00     Average packs/day: 1 pack/day for 24.2 years (24.2 ttl pk-yrs)     Types: Cigarettes     Start date: 2000     Quit date: 3/22/2024     Years since quittin.2     Passive exposure: Never    Smokeless tobacco: Never    Tobacco comments:     Smoked 0.5-1 ppd; quit in 2024.    Vaping Use    Vaping status: Never Used   Substance Use Topics    Alcohol use: Not Currently     Alcohol/week: 1.0 standard drink of alcohol     Types: 1 Shots of  liquor per week     Comment: Every 2or 3months    Drug use: Never        Robson Mueller,   07/01/25 0250

## 2025-07-02 ENCOUNTER — PATIENT OUTREACH (OUTPATIENT)
Dept: CASE MANAGEMENT | Facility: OTHER | Age: 65
End: 2025-07-02

## 2025-07-02 ENCOUNTER — APPOINTMENT (OUTPATIENT)
Dept: LAB | Facility: HOSPITAL | Age: 65
End: 2025-07-02
Payer: COMMERCIAL

## 2025-07-02 ENCOUNTER — OFFICE VISIT (OUTPATIENT)
Dept: PAIN MEDICINE | Facility: CLINIC | Age: 65
End: 2025-07-02

## 2025-07-02 VITALS — WEIGHT: 212.6 LBS | BODY MASS INDEX: 32.22 KG/M2 | HEIGHT: 68 IN

## 2025-07-02 DIAGNOSIS — I50.23 ACUTE ON CHRONIC SYSTOLIC (CONGESTIVE) HEART FAILURE (HCC): ICD-10-CM

## 2025-07-02 DIAGNOSIS — R06.02 SHORTNESS OF BREATH: ICD-10-CM

## 2025-07-02 DIAGNOSIS — Z98.1 STATUS POST LUMBAR SPINAL FUSION: Primary | ICD-10-CM

## 2025-07-02 DIAGNOSIS — R05.3 CHRONIC COUGH: Primary | ICD-10-CM

## 2025-07-02 LAB
ANION GAP SERPL CALCULATED.3IONS-SCNC: 8 MMOL/L (ref 4–13)
BUN SERPL-MCNC: 13 MG/DL (ref 5–25)
CALCIUM SERPL-MCNC: 10.1 MG/DL (ref 8.4–10.2)
CHLORIDE SERPL-SCNC: 106 MMOL/L (ref 96–108)
CO2 SERPL-SCNC: 25 MMOL/L (ref 21–32)
CREAT SERPL-MCNC: 0.87 MG/DL (ref 0.6–1.3)
GFR SERPL CREATININE-BSD FRML MDRD: 69 ML/MIN/1.73SQ M
GLUCOSE P FAST SERPL-MCNC: 116 MG/DL (ref 65–99)
POTASSIUM SERPL-SCNC: 4.1 MMOL/L (ref 3.5–5.3)
SODIUM SERPL-SCNC: 139 MMOL/L (ref 135–147)

## 2025-07-02 PROCEDURE — 99213 OFFICE O/P EST LOW 20 MIN: CPT | Performed by: ANESTHESIOLOGY

## 2025-07-02 PROCEDURE — 36415 COLL VENOUS BLD VENIPUNCTURE: CPT

## 2025-07-02 PROCEDURE — 80048 BASIC METABOLIC PNL TOTAL CA: CPT

## 2025-07-02 NOTE — TELEPHONE ENCOUNTER
Spoke with pt she still feels lightheadd and coughing up green sputum. Both of which have been going on since mid May.    Advised if she has trouble breathing, CP/ sweats to go to ER. Verbally understood.

## 2025-07-02 NOTE — PROGRESS NOTES
"Outpatient Care Management Not    ADT alert received- ED discharge. Chart reviewed.  Noted that patient has appointments scheduled with Cardiology on 7/8/25 and PCP on 7/9/25.    Patient was seen in the ED at [facility] on 7/1/25.  Per chart, patient presented with chest discomfort and palpitations.    RN CM will place outreach call to patient for follow up to ED visit.    Call placed and spoke with patient who had just finished physical therapy, which she states she tolerated just fine.  Patient states that she is feeling \"a little better\" but states that she is still having nausea and added that she is \"coughing up green stuff\" that is making her vomit.  She states that she has been dealing with the cough and mucus since May.   She denies having any fevers.   RN CM will route message to PCP and request follow up with patient to advise- patient verbalized understanding and appreciation    Patient denies having any recurring symptoms of chest pain or palpitations and heart rates has not been elevated. She denies any shortness of breath at this time but states that she can feel a little short of breath with the coughing.  Patient continues to monitor weight daily and denies any significant changes. She states that her weight yesterday was 210.4 lbs and today it was 210.6 lbs.  RN CM reviewed weight rule with patient and instructed patient to notify physician for any weight gain of 2-3 lbs overnight or 5 lbs in a week and for any swelling of extremities, shortness of breath or chest pain.  Encouraged patient to return to ED for emergency- patient verbalized understanding.    Following low sodium diet:   yes  Following fluid restriction:  yes,  64 ounces  Hospital discharge weight:   210 lbs, 1.6 oz  Weighing daily:   yes, monitoring daily each morning.           Weight log: yes       Weight today: 210.6 lbs  Monitoring symptoms: yes, patient is monitoring for weight gain of 2-3 lbs overnight or 5 lbs in a week, " swelling, shortness of breath, chest pain, recurring palpitations and other concerning symptoms.  Any current symptoms:   When to call provider: reviewed with patient- patient verbalized understanding.   Knows name of diuretic:  taking Torsemide as prescribed.  Escalation plan: reviewed- patient verbalized understanding  HF education reviewed/reinforced including low sodium diet, fluid restriction, activity, symptoms of decompensation and when and who to call.   Cardiology follow up appointment: 7/8/25  PCP follow up appointment: 7/9/25  Transportation: drives self    RN CM will schedule next outreach for one week.

## 2025-07-02 NOTE — PROGRESS NOTES
Spoke with patient and she is agreeable to seeing a pulmonologist. She prefers the office in [place].  She had no further questions at this time and is aware of her upcoming appointment.    seen and  examiined  vsstable physical done  pt still in Lt sided cp   non radiating   tenderness little better    no sob no abd pain  labs noted hgb 8.8 to 7.6  k 5.4c  a/p chest pain possibly non cardiac   PE unlikely   mets to bones but didn't show up in cxr and ct [facility]   oncology  ?? bone scan   HD today   metastatic renal cell ca  poor prognosis   palleative   anemia  watch hgb   on epogen seen and examined  vs stable   pt is alert awake  not in any distress  not on O2 doing ok  physical done    hgb 9.4  a/p  pneumonia  on cefepime and steroids   to be cleared by pulm and ID

## 2025-07-03 ENCOUNTER — OFFICE VISIT (OUTPATIENT)
Dept: FAMILY MEDICINE CLINIC | Facility: CLINIC | Age: 65
End: 2025-07-03
Payer: COMMERCIAL

## 2025-07-03 VITALS
OXYGEN SATURATION: 98 % | HEART RATE: 76 BPM | DIASTOLIC BLOOD PRESSURE: 54 MMHG | TEMPERATURE: 97 F | BODY MASS INDEX: 32.15 KG/M2 | WEIGHT: 211.42 LBS | SYSTOLIC BLOOD PRESSURE: 96 MMHG

## 2025-07-03 DIAGNOSIS — E78.2 MIXED HYPERLIPIDEMIA: ICD-10-CM

## 2025-07-03 DIAGNOSIS — I25.10 CORONARY ARTERY DISEASE INVOLVING NATIVE CORONARY ARTERY OF NATIVE HEART WITHOUT ANGINA PECTORIS: Chronic | ICD-10-CM

## 2025-07-03 DIAGNOSIS — J47.9 BRONCHIOLECTASIS (HCC): ICD-10-CM

## 2025-07-03 DIAGNOSIS — E11.9 TYPE 2 DIABETES MELLITUS WITHOUT COMPLICATION, WITHOUT LONG-TERM CURRENT USE OF INSULIN (HCC): ICD-10-CM

## 2025-07-03 DIAGNOSIS — E86.1 HYPOTENSION DUE TO HYPOVOLEMIA: Primary | ICD-10-CM

## 2025-07-03 DIAGNOSIS — R11.2 NAUSEA AND VOMITING, UNSPECIFIED VOMITING TYPE: ICD-10-CM

## 2025-07-03 PROCEDURE — 99214 OFFICE O/P EST MOD 30 MIN: CPT | Performed by: FAMILY MEDICINE

## 2025-07-03 RX ORDER — METHYLPREDNISOLONE 4 MG/1
TABLET ORAL
Qty: 21 EACH | Refills: 0 | Status: SHIPPED | OUTPATIENT
Start: 2025-07-03

## 2025-07-03 NOTE — PROGRESS NOTES
Name: Vesna Langston      : 1960      MRN: [id number]  Encounter Provider: Melissa Montoya DO  Encounter Date: 7/3/2025   Encounter department: [facility]  :  Assessment & Plan  Hypotension due to hypovolemia  Current Blood Pressure: 96/54  Patient did not take torsemide today  Recommend she continue to hold torsemide - no signs of volume overload on exam  Patient to watch home weights closely and let me know if weight goes up by 3 pounds in a day or 5 pounds in a week  Message sent to her cardiologist update regarding this hold at this time.  Patient to continue monitoring blood pressure at home and letting me know if it does not improve       Bronchiolectasis (HCC)  Noted on recent CT, and patient with greater than 4 weeks of ongoing cough that is productive of colored sputum  She completed course of antibiotics last month when this started  Recommend Medrol pack at this time for bronchial inflammation  Referred to pulmonology yesterday, has appointment in 2 months  Follow-up if not improving  Orders:    methylPREDNISolone 4 MG tablet therapy pack; Use as directed on package    Nausea and vomiting, unspecified vomiting type  Likelt side effect of ozempic, will no longer increase dose, stay on 0.5mg dose  Encourage hydration  If not improving, may need to lower ozempic dose           Return for Next scheduled follow up.       History of Present Illness   Chief Complaint   Patient presents with    Nausea     Patient in office today experiencing dizziness, nausea and vomiting.      103/62 BP at home earlier  Currently 96/54  Having dizziness, nausea and vomiting.   Was recently hospitalized and imdur and entresto were both decreased.   Stopped Lyrica and was started on torsemide daily in place of her prn lasix  Symptoms have not improved and seem to be worsening  She did not take her torsemide today.    Review of Systems   Gastrointestinal:  Positive for nausea and  vomiting.   Neurological:  Positive for dizziness and light-headedness.       Objective   BP 96/54 (BP Location: Left arm, Patient Position: Sitting, Cuff Size: Large)   Pulse 76   Temp (!) 97 °F (36.1 °C) (Tympanic)   Wt 95.9 kg (211 lb 6.7 oz)   SpO2 98%   BMI 32.15 kg/m²      Physical Exam  Vitals reviewed.   Constitutional:       General: She is not in acute distress.     Appearance: She is obese. She is not ill-appearing.     Cardiovascular:      Rate and Rhythm: Normal rate.   Pulmonary:      Effort: Pulmonary effort is normal.      Breath sounds: Normal breath sounds. No wheezing, rhonchi or rales.     Musculoskeletal:      Right lower leg: No edema.      Left lower leg: No edema.     Neurological:      Mental Status: She is alert.     Psychiatric:         Mood and Affect: Mood normal.         Behavior: Behavior normal.

## 2025-07-04 DIAGNOSIS — I25.10 CORONARY ARTERY DISEASE INVOLVING NATIVE CORONARY ARTERY OF NATIVE HEART WITHOUT ANGINA PECTORIS: Chronic | ICD-10-CM

## 2025-07-04 DIAGNOSIS — E78.2 MIXED HYPERLIPIDEMIA: ICD-10-CM

## 2025-07-04 DIAGNOSIS — E11.9 TYPE 2 DIABETES MELLITUS WITHOUT COMPLICATION, WITHOUT LONG-TERM CURRENT USE OF INSULIN (HCC): ICD-10-CM

## 2025-07-05 NOTE — ASSESSMENT & PLAN NOTE
-continue norco for acute post op pain; (patient using sparingly); advised multimodal pain therapy. Discussed risks of opioids; given cardiac history, recent fluid overload, will discontinue lyrica for now.  -f/u prn for medication management.

## 2025-07-05 NOTE — PROGRESS NOTES
Name: Vesna Langston      : 1960      MRN: [id number]  Encounter Provider: Maxime Cameron MD  Encounter Date: 2025   [facility] [place]  :  Assessment & Plan  Status post lumbar spinal fusion       -continue norco for acute post op pain; (patient using sparingly); advised multimodal pain therapy. Discussed risks of opioids; given cardiac history, recent fluid overload, will discontinue lyrica for now.  -f/u prn for medication management.      [place] Prescription Drug Monitoring Program report was reviewed and was appropriate      My impressions and treatment recommendations were discussed in detail with the patient who verbalized understanding and had no further questions.  Discharge instructions were provided. I personally saw and examined the patient and I agree with the above discussed plan of care.    History of Present Illness     Vesna Langston is a 65 y.o. female who presents for a follow up office visit in regards to Follow-up after L3-S1 fusion on 25. Has been taking norco intermittently with continued 5/10 pain, with exacerbations for which tylenol, norco is helping. Has since weaned lyrica given fluid retention post op. Overall states she is managing as best as she can. She denies any weakness, numbness or paresthesias. Continues to remain out of work and will defer work status to ortho spine surgery recommendations.     Current pain medications includes:  norco intermittently.  The patient reports that this regimen is providing 30% pain relief.  The patient is reporting no side effects from this pain medication regimen.      Review of Systems   Constitutional:  Positive for activity change.   HENT: Negative.     Eyes: Negative.    Respiratory: Negative.     Cardiovascular: Negative.    Gastrointestinal: Negative.    Endocrine: Negative.    Genitourinary: Negative.    Musculoskeletal:  Positive for arthralgias, back pain, gait problem  "and myalgias.   Skin: Negative.    Allergic/Immunologic: Negative.    Neurological:  Positive for weakness and numbness.   Hematological: Negative.    Psychiatric/Behavioral: Negative.     All other systems reviewed and are negative.      Medical History Reviewed by provider this encounter:  Tobacco  Allergies  Meds  Problems  Med Hx  Surg Hx  Fam Hx     .       Objective   Ht 5' 8\" (1.727 m)   Wt 96.4 kg (212 lb 9.6 oz)   BMI 32.33 kg/m²         Physical Exam    Constitutional: normal, well developed, well nourished, alert, in no distress and non-toxic and no overt pain behavior.  Psychiatric:Mood and affect appropriate  Neurologic:Cranial Nerves II-XII grossly intact Sensation grossly intact; no clonus negative youngblood's. Reflexes 2+ and brisk. SLR neg  Musculoskeletal:normal gait. 5/5 strength bilaterally with AROM in lower extremities. Difficulty with normal heel toe and tip toe walking. Significant pain with lumbar facet loading bilaterally and with lateral spine rotation, ttp over lumbar paraspinal muscles. Negative pia's test, negative gaenslen's negative SIJ loading bilaterally.    Review of external notes including any available PT notes, PCP notes and specialist notes was performed at this visit in addition to review of new ordered imaging and past imaging to develop or modify multidisciplinary pain plan    Radiology Results Review: I personally reviewed the following imaging studies: xray lumbar spine. My interpretation is posterior lumbar fusion without any hardware complication    Administrative Statements   I have spent a total time of 25 minutes in caring for this patient on the day of the visit/encounter including Diagnostic results, Prognosis, Risks and benefits of tx options, Instructions for management, Patient and family education, Importance of tx compliance, Risk factor reductions, Impressions, Counseling / Coordination of care, Documenting in the medical record, Reviewing/placing " orders in the medical record (including tests, medications, and/or procedures), Obtaining or reviewing history  , and Communicating with other healthcare professionals .

## 2025-07-05 NOTE — PATIENT INSTRUCTIONS
"Patient Education     Back exercises   The Basics   Written by the doctors and editors at [facility]   Why do I need to do back exercises? -- Back exercises can help ease back pain and might help prevent future back pain. Long term, it is important to strengthen the muscles in your lower back, buttocks, and belly. These are your \"core muscles.\" Stretching exercises are also important to keep your muscles flexible.  Below are some stretching and strengthening exercises that might help you. Other forms of movement can help ease or prevent back pain, too. For example, some people like to walk, do aerobic exercise, or do yoga or chrissie chi. The most important thing is to move your body. Your doctor, nurse, or physical therapist can help you find different types of activity that work for you.  What stretching exercises should I do? -- Below are some examples of stretching exercises. Warm up your muscles before stretching to help prevent injury. To warm up, you can walk, jog, or cycle for a few minutes.  Start by repeating each of these stretches 2 to 3 times. Try to hold each stretch for 5 to 10 seconds, and try to do the stretches 2 to 3 times each day. Breathe slowly and deeply as you do the exercises. Never bounce when doing stretches.   Cat-cow stretch (figure 1) - Start on all fours on the floor, with your hands under your shoulders, knees under your hips, and your back flat. First, tuck your chin and tighten your stomach muscles as you round your back (like a \"cat\"). Hold the stretch for about 10 seconds. Rest for a few seconds as you flatten your back. Next, lift your chin and let your belly and lower back sink toward the floor (like a \"cow\"). Hold the stretch for about 10 seconds.   Single knee-to-chest stretches (figure 2) ? While lying on your back, bend your knees with your feet flat on the floor. Pull 1 knee toward your chest until you feel a stretch in your lower back and buttock area. Lower, and repeat with the " other knee. If you have knee problems, pull your knee up by grabbing the back of your thigh instead of the front of your knee. You can also do this exercise by grabbing both knees at the same time.   Lower trunk rotations (figure 3) ? While lying on your back, bend your knees with your feet flat on the floor. Keep your knees and ankles together, and then drop them to 1 side. Keep both of your shoulders touching the floor until you feel a stretch in the muscles at the side of the back. Repeat on the other side.   Lower back stretches seated (figure 4) ? Sit in a chair with your feet spread about shoulder width apart. Then, lean forward until you feel a stretch in your lower back.  What strengthening exercises should I do? -- Below are some examples of strengthening exercises.  Start by doing each exercise 2 to 3 times. Work up to doing each exercise 10 times. Hold each exercise for 3 to 5 seconds, and try to do the exercises 2 to 3 times each day. Do all exercises slowly.   Shoulder blade squeezes (figure 5) ? Pinch your shoulder blades together on your upper back, and hold 3 to 5 seconds. You can also do these 1 side at a time. Sit with good posture, and make sure that your shoulders do not rise up when you do this exercise. Relax.   Pelvic tilts (figure 6) ? Lie on your back with your knees bent and feet flat on the floor. Tighten your stomach muscles, and press your lower back down to the floor. Relax. You should be able to breathe comfortably during this exercise.   Hip lifts (figure 7) ? Lie on your back with your knees bent and feet flat on the floor. Tighten your stomach muscles, keep your back flat, and lift your buttocks off of the floor. Relax. You should feel this in your buttocks, not in your lower back.  What else should I know?    Exercise, including stretching, might be slightly uncomfortable. But you should not have sharp or severe pain. If you do get severe pain, stop what you are doing. If severe  "pain continues, call your doctor or nurse.   Do not hold your breath when exercising. If you tend to hold your breath, try counting out loud when exercising. If any exercise bothers you, stop right away.   Always warm up before exercising. Warm muscles stretch much easier than cool muscles. Stretching cool muscles can lead to injury.   Doing exercises before a meal can be a good way to get into a routine.  All topics are updated as new evidence becomes available and our peer review process is complete.  This topic retrieved from [employer] on: Apr 03, 2024.  Topic 556592 Version 2.0  Release: 32.2.4 - C32.92  © 2024 UpToDate, Inc. and/or its affiliates. All rights reserved.  figure 1: Cat-cow stretch     Start on all fours on the floor, with hands under your shoulders, knees under your hips, and your back flat. First, tuck your chin and tighten your stomach muscles as you round your back (like a \"cat\"). Hold the stretch for about 10 seconds. Rest for a few seconds as you flatten your back. Next, lift your chin and let your belly and lower back sink toward the floor (like a \"cow\"). Hold the stretch for about 10 seconds.  Graphic [id number] Version 1.0  figure 2: Single knee-to-chest stretch     Lie on your back, bend your knees, and have your feet flat on the floor. Pull 1 knee toward your chest until you feel a stretch in your lower back and buttock area. Repeat with the other knee. If you have knee problems, pull your knee up by grabbing the back of your thigh instead of the front of your knee. You can also do this exercise by grabbing both knees at the same time.  Graphic [id number] Version 1.0  figure 3: Lower trunk rotation     While lying on your back, bend your knees and have your feet flat on the floor. Keep your legs together and then drop them to 1 side. Keep both of your shoulders touching the floor until you feel a stretch in the muscles at the side of the back. Repeat on the other side.  Graphic [id number] Version " 1.0  figure 4: Lower back stretch     Sit in a chair with your feet spread about shoulder width apart. Then, lean forward until you feel a stretch in your lower back.  Graphic [id number] Version 1.0  figure 5: Shoulder blade squeezes     Pinch your shoulder blades together on your upper back and hold for 3 to 5 seconds. Make sure that you are sitting with good posture and that your shoulders do not raise up when you do this exercise. Relax.  Graphic [id number] Version 1.0  figure 6: Pelvic tilts     Lie on your back with your knees bent and feet flat on the floor. Tighten your stomach muscles and press your lower back down to the floor. Relax.  Graphic [id number] Version 1.0  figure 7: Hip lifts     Lie on your back with your knees bent and feet flat on the floor. Tighten your stomach muscles and lift your buttocks off of the floor. Relax.  Graphic [id number] Version 1.0  Consumer Information Use and Disclaimer   Disclaimer: This generalized information is a limited summary of diagnosis, treatment, and/or medication information. It is not meant to be comprehensive and should be used as a tool to help the user understand and/or assess potential diagnostic and treatment options. It does NOT include all information about conditions, treatments, medications, side effects, or risks that may apply to a specific patient. It is not intended to be medical advice or a substitute for the medical advice, diagnosis, or treatment of a health care provider based on the health care provider's examination and assessment of a patient's specific and unique circumstances. Patients must speak with a health care provider for complete information about their health, medical questions, and treatment options, including any risks or benefits regarding use of medications. This information does not endorse any treatments or medications as safe, effective, or approved for treating a specific patient. Up[employer], Inc[employer] and its affiliates disclaim any warranty or  liability relating to this information or the use thereof.The use of this information is governed by the Terms of Use, available at https://www.wolterskluwer.com/en/know/clinical-effectiveness-terms. 2024© UpToDate, Inc. and its affiliates and/or licensors. All rights reserved.  Copyright   © 2024 [employer] and/or its affiliates. All rights reserved.

## 2025-07-06 RX ORDER — SEMAGLUTIDE 0.68 MG/ML
INJECTION, SOLUTION SUBCUTANEOUS
Refills: 1 | OUTPATIENT
Start: 2025-07-06

## 2025-07-07 NOTE — PROGRESS NOTES
"Advanced Heart Failure/Pulmonary Hypertension Outpatient Note - Vesna Langston 65 y.o. female MRN: [id number]    @ Encounter: [id number]    Assessment:  65 y.o. female PMH and acute problems listed later in this note (a partial list may also be included within 'assessment' section) p/w HF fu.  I first met Vesna Langston on 5/6/24.    Primarily ICM, HFimpEF, LVEF 30-35%>PCI+gdmt>40-45% 10/2024 and 6/2025 TTEs, nondilated LV  LHC 03/22/2024: s/p PCI to 100% stenosis of ostial/proximal LAD. No other residual dz elsewhere.  cMRI 03/26/2024: LVEF 20%. LVIDd 4.5 cm. \"Transmural scarring of the mid anterior, anteroseptal and inferoseptal walls, the apical anterior, septal and inferior walls and the apex.\"   Coronary artery disease  S/p MI in 03/2024; received PCI to 100% stenosis of ostial/proximal LAD. No residual dz elsewhere.  History of monomorphic ventricular tachycardia              Per EP notes, felt to be scar mediated.               S/p secondary prevention ICD.  Atrial flutter, paroxysmal               Anticoagulation on Eliquis.   was on amiodarone per EP.  transitioned to dofetilide 125mcg twice daily 11/2024 during elective tikosyn admit.  PE 5/2024. CTA: Single subsegmental right lower lobe pulmonary embolus as shown on abdominal CT.   Hyperlipidemia  Diabetes mellitus, type II  Chronic back pain, multiple past surgeries, follows surgical team in [facility]. Dr. Pena  History of tobacco abuse  4/7/24 CT: IMPRESSION:  Suspected small hemorrhage from the anterior wall of the distal gastric body with focal wall thickening. Bleeding due to underlying lesion or PUD is suspected. Recommend endoscopic correlation.  Past heavy NSAID use, 2024 stopped  dieting in [id number] - was having only 1000 calories a day she says, increased somewhat in 2025 to 1500 rachael/day, have discussed nutrition cx with her in past.  Lung and spleen granulomas.  5/1/25 back surgery found Pseudoarthrosis with loose L3-S1 " "screws:  Procedure(s):  Exploration of fusion mass, L3-S1: 77974  Removal of hardware, L3-S1: 31914  Arthrodesis, L5-S1 TLIF: 22633  Laminotomy/facetectomy, L5-S1: 84417   Arthrodesis, L4-5 PL: 26138   Arthrodesis, L3-4 PL: 90044   Posterior segmental instrumentation/pedicle screw fixation,L3-S1: 88626  Bronchiolectasis, chronic productive cough, multiple abx and steroid courses 2025  Works in [employer] in past  Prior smoker, quit [id number]      I have reviewed all pertinent patient data including but not limited to:        Lab Units 07/02/25  0707 07/01/25  0944 06/25/25  0510   CREATININE mg/dL 0.87 0.86 0.95     Results from last 7 days   Lab Units 07/02/25  0707   CREATININE mg/dL 0.87         Lab Results   Component Value Date    K 4.1 07/02/2025     Lab Results   Component Value Date    HGBA1C 5.9 (H) 03/28/2025     Lab Results   Component Value Date    AUE0LRVCAURV 3.749 04/10/2025     Lab Results   Component Value Date    LDLCALC 68 03/28/2025     Lab Results   Component Value Date    BNP 61 07/01/2025      No results found for: \"NTBNP\"       Home scale dry weight 205lbs or so in 2025  When decompensated she has been 227lbs    TODAY'S PLAN:     07/08/25  Warm, euvolemic  No new cardiac complaints, occasional dizziness+fatigue at time of low SBP 77-85 range measured at home, has good and bad days, remains active  No CP  No new sob - better with steroids for bronchiolectasis, ongoing copious mucous production    To address her hypotension while maintaining most optimal cardiac regimen, we will:  - DC imdur (this has already been reduced down to 30 qd, now we will stop), she can cw her rare SL nitro use prn  - cw her current prn torsemide strategy, she is not takikng standing dose at present. Again Long discussion about evidence of worsening HF, when to self uptitrate home diuretic and call cardiology office.  - resume her home compression stocking use, liberalize fluids   - prn midodrine 2.5 for SBP < 85 given " today - this Rx is undesirable in HFrEF but will utilize here    Has low iron stores - rec IV iron, fu PCP for evaluation    Gdmt below  Limited by hypotension in past, her regimen has been interrupted intermittently due to hypotension in 2024  No changes today  No MR    On AC+plavix  On high intens statin    On tikosyn per EP  QTc acceptable 6/4/25    Advised healthy diet, exercise/rehab as able    Bronchiolectasis per PCP    Follow up:  With me in 6 weeks, or sooner if symptoms evolve  In addition to follow up with their other medical providers    Key info from my prior notes:    We have discussed some of her risks with major back surgery. She understands there is some level of risk including recurrent MI and even death that we cannot entirely mitigate.    Follows with sleep medicine, planned on Tx for POP, has not started yet per prior notes - advised begin planned Tx as soon as able on multiple occasions     She usually takes lasix 40 bid with self titration for weight gain and SOB. She is completing 4 day burst of lasix 80 bid for 7 lbs weight gain over 5 days (no diet indiscretion). Then she will return to 40 bid.  She requests to reattempt jardiance now, which will also have some diuretic effect. She felt well on this drug in past.  Extensive discussions about risks vs benefits as previously documented. She has had UTI in past causing sglt2i cessation, she recognizes risks and wishes to reattempt 1/2025.    Strict RTC precautions given  Fu in 2 weeks  She will call in 4-5 days with symptom check in  Low threshold repeat echo  Doubt new PE but monitor closely    Home weight up 217 > 220lbs today, feels mild leg swelling, bloating; has been much more SOB x 3 days which began suddenly - previously felt very well for first few days after recent discharge (had elective tiksoyn initiation admit). Has ongoing unremitting LUE numbness from inner biceps area to hand (not more proximally) - ongoing x days after moved  boxes with grandkids in attic during weekend, up to 40lbs.  No new chest pain  No syncope  Worsening hypotension  Minimal salt intake, drinks about 50-55 oz water daily she says  She was treated for yeast infx x 3days per PCP, she says her symptoms entirely resolved, no new pain, no fevers.   She does note she is recovering from sinusitis.    Messaged EP team today regarding QTc trend  I cannot see their ECG from today in muse or epic media tab, priors borderline  Fu EP for tikosyn plan    Will need to back off gdmt at least for now 2/2 hypotension and her Sx, reductions as below    Now seeing nutritionist, no longer doing extreme caloric restriction to 800 cals per day, as she was in recent past  Hydrating better    She has been consuming only 800 calories a day for 1 month or longer in an extreme attempt to lose weight > advised against this. Nutrition referral given today.    Trial imdur for any component of angina  Then 1 week later up entresto and fu BMP afterwards; this will also help with volume management  Cw lasix 20 qd standing for now, which is recent increase from prior    Warm, euvolemic  Has increased lasix 20 prn to qdaily standing for past 1 week for increased weight, mild sob, mild edema of feet - no major improvement.  Today is last minute urgent visit for ED visit 8/25 for CP in setting of home  transiently, ACS ruled out. Then 8/26 yesterday had episode of vomiting, faintness, no LOC, no chest pain, has felt better since then. No ICD shocks, no palpitations. Did not improve with meclizine. Normal-higher blood sugars at home recently/during events.  Very rare opioid use she says for her back pain issues, none since 1-2 weeks ago    Follows GI    Discussed therapeutic lifestyle changes, low-moderate intensity exercise, maintain acceptable hydration 64 oz water daily, salt restrict, Mediterranean vs DASH diet, weight loss  Avoid nsaids    Further review of return to work next week  Safest  plan would be no driving x 3 mo - resume 6/2024 if remains stable    Pharmacotherapies / Neurohormonal Blockade:  --Beta Blocker: metoprolol succinate 50 qd  --ARNi / ACEi / ARB: entresto 49/51 bid > pause 12/2/24 for hypotension (near term resumption planned)>resume entresto 49/51 bid 12/13/24 (low threshold reduce dose) > now 24/26 bid  --Aldosterone Antagonist: aldactone 25 qd > stopped 12/2/24 for hypotension, consider resumption future  --SGLT2 Inhibitor: jardiance 25 qd for HF and DM > subsequently Sglt2i stopped in early 2024 2/2 yeast infx>9/10/24 Re-attempt lower sglt2i dose now, jardiance 10 qd, long discussion risks vs benefits and this is pt wish> DC 11/2024 2/2 recurrent yeast infx (may consider repeat re-attempt future, pt indicates desire for this 12/2/24) > plan above, pt requests reattempt 1/3/25 and we resumed 25 qd > refilled at 10 qd 6/10/25    --Diuretic: lasix 40 bid, potassium 20 qd > she has moved to torsemide 20 as needed and kdur as needed as of 2025  Long discussion about evidence of worsening HF, when to self uptitrate home diuretic and call cardiology office     Sudden Cardiac Death Risk Reduction:  --Medtronic dual chamber ICD in situ since 03/2024 (secondary prevention).   6/2025  MDT DUAL ICD (MVP ON) / ACTIVE SYSTEM IS MRI CONDITIONAL   DEVICE INTERROGATED IN THE [facility] OFFICE. NON-BILLABLE EQUIPMENT CHECK. BATTERY VOLTAGE ADEQUATE (11.6 YR). AP 24.3%.  <0.1%. ALL LEAD PARAMETERS WITHIN NORMAL LIMITS. NO SIGNIFICANT HIGH RATE EPISODES. OPTI-VOL WITHIN NORMAL LIMITS. BACK MASSAGER (3.7 V) & CONTROLLER TESTED @ [facility]; NO AMPERAGE, RESISTANCE, OR USG INFO PROVIDED. NO DIONI OR MAGNET RESPONSE NOTED. LOW URGENCY DEVICE TONE DEMONSTRATED TO PT. PT CAUTIONED TO DISCONTINUE MASSAGER USE IF MAGNET RESPONSE TONE HEARD. NO PROGRAMMING CHANGES MADE TO DEVICE PARAMETERS. NORMAL DEVICE FUNCTION.   Electrical Resynchronization:  --Candidacy for BiV device: narrow QRS.   Advanced  Therapies: Will continue to monitor.    Studies:  I have reviewed all pertinent patient data/labs/imaging where available, including but not limited to the below studies. Selected results may be displayed here but comprehensive listing is omitted for note clarity and can be found in the epic chart.    ECG.    Echo.    Stress.    Cath.    HPI:   64 y.o. female PMH and acute problems listed later in this note (a partial list may also be included within 'assessment' section) p/w HF fu.  I first met Vesna Langston on 5/6/24.  No new CP/SOB/dizziness/palpitations/syncope.  No new fatigue.  No new unintentional weight changes.  No new leg swelling, PND, pillow orthopnea.  No new fevers, chills, cough, nausea, vomiting, diarrhea, dysuria.      Interval History:  As noted in 'plan' section above and prior epic chart notes.    No new CP/SOB/palpitations/syncope.  No new fatigue.  No new PND, pillow orthopnea.  No new fevers, chills, cough, nausea, vomiting, diarrhea, dysuria.    Past Medical History:   Diagnosis Date    Acute respiratory insufficiency 03/22/2024    Allergic     Anxiety 4/24    Arrhythmia 3/22/24    Atrial fibrillation (HCC) 3/22/24    Back pain 2022    Bradycardia 3/22/24    BRCA1 negative     BRCA2 negative     Breast cyst 2000    Chronic HFrEF (heart failure with reduced ejection fraction) (HCC)     Clotting disorder (HCC) 4/1/24    Coronary artery disease     COVID-19 virus infection 11/28/2022    Depression 4/24    Diabetes mellitus (HCC)     Disease of thyroid gland 4/09/2024    Environmental and seasonal allergies 1/1/1960    GERD (gastroesophageal reflux disease) 10/09    GI (gastrointestinal bleed) 4/1/24    Heart disease 3/24    History of placement of internal cardiac defibrillator 3/27/24    Hyperlipidemia     Hypoglycemia     ICD (implantable cardioverter-defibrillator) in place     Ischemic cardiomyopathy     Lactic acidosis 03/22/2024    Migraine 1980    Morning headache 1980    Myocardial  infarction ([facility]) 3/22/2024    Nausea & vomiting 04/03/2025    Otitis media 1966    Pacemaker 3/27/24    Peptic ulceration 4/2024    Seasonal allergies     Sinus problem 1970    ST elevation myocardial infarction involving left anterior descending (LAD) coronary artery ([facility]) 03/22/2024    Tobacco abuse     URI (upper respiratory infection) 06/21/2025    Visual impairment 1967     Patient Active Problem List    Diagnosis Date Noted    Vitamin D deficiency 05/09/2025    Acute pain 05/06/2025    GERD (gastroesophageal reflux disease) 05/06/2025    Hypothyroid 05/06/2025    Anemia 05/02/2025    Status post lumbar spinal fusion 05/01/2025    Other spondylosis with radiculopathy, lumbar region 03/10/2025    Stage 2 chronic kidney disease 01/03/2025    Other pulmonary embolism without acute cor pulmonale, unspecified chronicity ([facility]) 01/03/2025    Acute on chronic systolic (congestive) heart failure ([facility]) 01/03/2025    Obesity, morbid ([facility]) 01/03/2025    T2DM (type 2 diabetes mellitus) ([facility]) 01/03/2025    Primary osteoarthritis of right knee 10/08/2024    History of torn meniscus of right knee 10/08/2024    Chronic pain of right knee 10/08/2024    Localized edema 10/02/2024    POP (obstructive sleep apnea) 09/23/2024    Class 1 obesity due to excess calories with serious comorbidity and body mass index (BMI) of 34.0 to 34.9 in adult 08/27/2024    History of pulmonary embolism 05/07/2024    History of gastric ulcer 05/07/2024    Acquired hypothyroidism 04/19/2024    Constipation 04/10/2024    Type 2 diabetes mellitus, without long-term current use of insulin ([facility]) 04/07/2024    S/P ICD (internal cardiac defibrillator) procedure 03/27/2024    Chronic low back pain 03/25/2024    HFrEF (heart failure with reduced ejection fraction) ([facility]) 03/25/2024    Ischemic cardiomyopathy 03/24/2024    CAD (coronary artery disease) 03/23/2024    S/P coronary artery stent placement 03/23/2024    Atrial flutter, paroxysmal ([facility]) 03/22/2024     Mixed hyperlipidemia 03/22/2024    History of tobacco abuse 03/22/2024    History of sustained ventricular tachycardia 03/22/2024    Back pain 07/31/2022    Sciatica of left side 07/31/2022       ROS:  10 point ROS negative except as specified in HPI/interval history    Allergies   Allergen Reactions    Fish-Derived Products - Food Allergy Anaphylaxis     Cannot have all seafood products    Shellfish-Derived Products - Food Allergy Anaphylaxis    Azithromycin Hives and Rash     Current Outpatient Medications   Medication Instructions    acetaminophen (TYLENOL) 650 mg, Oral, Every 6 hours PRN    albuterol (ProAir HFA) 90 mcg/act inhaler 2 puffs, Inhalation, Every 6 hours PRN    apixaban (ELIQUIS) 5 mg, Oral, 2 times daily    atorvastatin (LIPITOR) 80 mg, Oral, Every evening    clopidogrel (PLAVIX) 75 mg, Oral, Daily    diphenhydrAMINE (BENADRYL) 25 mg, Every 6 hours PRN    dofetilide (TIKOSYN) 125 mcg, Oral, Every 12 hours scheduled    Empagliflozin (JARDIANCE) 10 mg, Oral, Every morning    ergocalciferol (VITAMIN D2) 50,000 Units, Oral, Weekly    ferrous gluconate (FERGON) 324 mg, Oral, Every other day    fluticasone (FLONASE) 50 mcg/act nasal spray 1 spray, Nasal, Daily    HYDROcodone-acetaminophen (NORCO) 5-325 mg per tablet 1 tablet, Oral, Every 8 hours PRN, May cause drowsiness and dizziness. This medication does contain tylenol/acetaminophen, so will need to limit any supplemental tylenol intake. Do not exceed 3000 mg (3g) of tylenol/acetaminophen every 24 hours.    levothyroxine (SYNTHROID) 75 mcg, Oral, Daily    meclizine (ANTIVERT) 25 mg, Oral, Every 8 hours PRN    methylPREDNISolone 4 MG tablet therapy pack Use as directed on package    metoprolol succinate (TOPROL-XL) 50 mg, Oral, Daily at bedtime    midodrine (PROAMATINE) 2.5 mg, Oral, As needed    Multiple Vitamin (multivitamin) tablet 1 tablet, Daily    pantoprazole (PROTONIX) 40 mg, Oral, 2 times daily before meals    potassium chloride (Klor-Con M20)  20 mEq tablet 20 mEq, Oral, As needed    sacubitril-valsartan (ENTRESTO) 24-26 MG TABS 1 tablet, Oral, 2 times daily    semaglutide (0.25 or 0.5 mg/dose) (OZEMPIC (0.25 OR 0.5 MG/DOSE)) 0.5 mg, Subcutaneous, Every 7 days, 0.25 mg under the skin every 7 days for 4 doses (28 days), THEN 0.5 mg under the skin every 7 days    torsemide (DEMADEX) 20 mg, Oral, As needed      Social History     Socioeconomic History    Marital status:      Spouse name: Not on file    Number of children: 3    Years of education: Not on file    Highest education level: Not on file   Occupational History    Not on file   Tobacco Use    Smoking status: Former     Current packs/day: 0.00     Average packs/day: 1 pack/day for 24.2 years (24.2 ttl pk-yrs)     Types: Cigarettes     Start date: 2000     Quit date: 3/22/2024     Years since quittin.2     Passive exposure: Never    Smokeless tobacco: Never    Tobacco comments:     Smoked 0.5-1 ppd; quit in 2024.    Vaping Use    Vaping status: Never Used   Substance and Sexual Activity    Alcohol use: Not Currently     Alcohol/week: 1.0 standard drink of alcohol     Types: 1 Shots of liquor per week     Comment: Every 2or 3months    Drug use: Never    Sexual activity: Not Currently     Partners: Male     Birth control/protection: Post-menopausal   Other Topics Concern    Not on file   Social History Narrative    Not on file     Social Drivers of Health     Financial Resource Strain: Low Risk  (2023)    Received from [facility]    Overall Financial Resource Strain (CARDIA)     Difficulty of Paying Living Expenses: Not hard at all   Food Insecurity: No Food Insecurity (2025)    Nursing - Inadequate Food Risk Classification     Worried About Running Out of Food in the Last Year: Never true     Ran Out of Food in the Last Year: Never true     Ran Out of Food in the Last Year: Never true   Transportation Needs: No Transportation Needs (2025)    Nursing -  "Transportation Risk Classification     Lack of Transportation: Not on file     Lack of Transportation: No   Physical Activity: Not on file   Stress: No Stress Concern Present (2/20/2023)    Received from [facility]    [employer] [place] [employer] - Occupational Stress Questionnaire     Feeling of Stress : Not at all   Social Connections: Unknown (6/18/2024)    Received from [employer]    [employer]     How often do you feel lonely or isolated from those around you? (Adult - for ages 18 years and over): Not on file   Intimate Partner Violence: Unknown (6/21/2025)    Nursing IPS     Feels Physically and Emotionally Safe: Not on file     Physically Hurt by Someone: Not on file     Humiliated or Emotionally Abused by Someone: Not on file     Physically Hurt by Someone: No     Hurt or Threatened by Someone: No   Housing Stability: Unknown (6/21/2025)    Nursing: Inadequate Housing Risk Classification     Has Housing: Not on file     Worried About Losing Housing: Not on file     Unable to Get Utilities: Not on file     Unable to Pay for Housing in the Last Year: No     Has Housing: No     Family History   Adopted: Yes   Problem Relation Name Age of Onset    Depression Mother Unknown     Sleep apnea Mother Unknown     Heart disease Father Unknown     Snoring Father Unknown     Restless legs syndrome Father Unknown     OCD Daughter       Physical Exam:  Vitals:    07/08/25 1319   BP: 98/60   BP Location: Left arm   Patient Position: Sitting   Cuff Size: Standard   Pulse: 87   SpO2: 97%   Weight: 93.7 kg (206 lb 9.6 oz)   Height: 5' 8\" (1.727 m)       Constitutional: NAD, non toxic  Ears/nose/mouth/throat: atraumatic  CV: RRR, nl S1S2, no murmurs/rubs/gallups, no JVD, no HJR  Resp: CTABL  GI: Soft, NTND  MSK: no swollen joints in exposed areas  Extr: No edema, warm LE  Pysche: Normal affect  Neuro: appropriate in conversation  Skin: dry and intact in exposed areas    Labs & Results:  Lab " Results   Component Value Date    SODIUM 139 07/02/2025    K 4.1 07/02/2025     07/02/2025    CO2 25 07/02/2025    BUN 13 07/02/2025    CREATININE 0.87 07/02/2025    GLUC 116 07/01/2025    CALCIUM 10.1 07/02/2025     Lab Results   Component Value Date    WBC 8.64 07/01/2025    HGB 11.5 07/01/2025    HCT 39.3 07/01/2025    MCV 84 07/01/2025     07/01/2025       Counseling / Coordination of Care  Greater than 50% of total time was spent with the patient and / or family counseling and / or coordination of care.  We discussed diagnoses, most recent studies, tests and any changes in treatment plan.    Thank you for the opportunity to participate in the care of this patient.    Vitor Bell MD  Attending Physician  Advanced Heart Failure and Transplant Cardiology  [facility]

## 2025-07-08 ENCOUNTER — OFFICE VISIT (OUTPATIENT)
Dept: CARDIOLOGY CLINIC | Facility: CLINIC | Age: 65
End: 2025-07-08
Payer: COMMERCIAL

## 2025-07-08 VITALS
HEIGHT: 68 IN | HEART RATE: 87 BPM | BODY MASS INDEX: 31.31 KG/M2 | DIASTOLIC BLOOD PRESSURE: 60 MMHG | SYSTOLIC BLOOD PRESSURE: 98 MMHG | OXYGEN SATURATION: 97 % | WEIGHT: 206.6 LBS

## 2025-07-08 DIAGNOSIS — I95.9 HYPOTENSION, UNSPECIFIED HYPOTENSION TYPE: ICD-10-CM

## 2025-07-08 DIAGNOSIS — E78.5 HYPERLIPIDEMIA, UNSPECIFIED HYPERLIPIDEMIA TYPE: ICD-10-CM

## 2025-07-08 DIAGNOSIS — I21.3 STEMI (ST ELEVATION MYOCARDIAL INFARCTION) (HCC): ICD-10-CM

## 2025-07-08 DIAGNOSIS — I50.23 ACUTE ON CHRONIC SYSTOLIC (CONGESTIVE) HEART FAILURE (HCC): Primary | ICD-10-CM

## 2025-07-08 DIAGNOSIS — I25.118 CORONARY ARTERY DISEASE OF NATIVE HEART WITH STABLE ANGINA PECTORIS, UNSPECIFIED VESSEL OR LESION TYPE (HCC): ICD-10-CM

## 2025-07-08 DIAGNOSIS — I21.02 ST ELEVATION MYOCARDIAL INFARCTION INVOLVING LEFT ANTERIOR DESCENDING (LAD) CORONARY ARTERY (HCC): ICD-10-CM

## 2025-07-08 DIAGNOSIS — I47.20 VENTRICULAR TACHYCARDIA (HCC): ICD-10-CM

## 2025-07-08 PROCEDURE — 99214 OFFICE O/P EST MOD 30 MIN: CPT | Performed by: STUDENT IN AN ORGANIZED HEALTH CARE EDUCATION/TRAINING PROGRAM

## 2025-07-08 RX ORDER — MIDODRINE HYDROCHLORIDE 2.5 MG/1
2.5 TABLET ORAL AS NEEDED
Qty: 30 TABLET | Refills: 2 | Status: SHIPPED | OUTPATIENT
Start: 2025-07-08 | End: 2025-10-06

## 2025-07-08 RX ORDER — ISOSORBIDE MONONITRATE 60 MG/1
30 TABLET, EXTENDED RELEASE ORAL DAILY
Qty: 45 TABLET | Refills: 0 | Status: SHIPPED | OUTPATIENT
Start: 2025-07-08 | End: 2025-07-08

## 2025-07-08 RX ORDER — TORSEMIDE 20 MG/1
20 TABLET ORAL AS NEEDED
Qty: 90 TABLET | Refills: 0 | Status: SHIPPED | OUTPATIENT
Start: 2025-07-08 | End: 2025-10-06

## 2025-07-08 RX ORDER — ISOSORBIDE MONONITRATE 30 MG/1
30 TABLET, EXTENDED RELEASE ORAL DAILY
Qty: 90 TABLET | Refills: 0 | Status: SHIPPED | OUTPATIENT
Start: 2025-07-08 | End: 2025-07-08 | Stop reason: ALTCHOICE

## 2025-07-09 ENCOUNTER — OFFICE VISIT (OUTPATIENT)
Dept: FAMILY MEDICINE CLINIC | Facility: CLINIC | Age: 65
End: 2025-07-09
Payer: COMMERCIAL

## 2025-07-09 VITALS
SYSTOLIC BLOOD PRESSURE: 115 MMHG | WEIGHT: 205.69 LBS | DIASTOLIC BLOOD PRESSURE: 65 MMHG | HEART RATE: 85 BPM | OXYGEN SATURATION: 98 % | BODY MASS INDEX: 31.27 KG/M2

## 2025-07-09 DIAGNOSIS — I50.23 ACUTE ON CHRONIC SYSTOLIC (CONGESTIVE) HEART FAILURE (HCC): Primary | ICD-10-CM

## 2025-07-09 DIAGNOSIS — D50.9 IRON DEFICIENCY ANEMIA, UNSPECIFIED IRON DEFICIENCY ANEMIA TYPE: ICD-10-CM

## 2025-07-09 DIAGNOSIS — E86.1 HYPOTENSION DUE TO HYPOVOLEMIA: ICD-10-CM

## 2025-07-09 PROCEDURE — 99495 TRANSJ CARE MGMT MOD F2F 14D: CPT | Performed by: FAMILY MEDICINE

## 2025-07-09 RX ORDER — SACUBITRIL AND VALSARTAN 24; 26 MG/1; MG/1
1 TABLET, FILM COATED ORAL 2 TIMES DAILY
Qty: 60 TABLET | Refills: 0 | Status: SHIPPED | OUTPATIENT
Start: 2025-07-09 | End: 2025-07-11 | Stop reason: SDUPTHER

## 2025-07-09 NOTE — PROGRESS NOTES
Transition of Care Visit:  Name: Vesna Langston      : 1960      MRN: [id number]  Encounter Provider: Melissa Montoya DO  Encounter Date: 2025   Encounter department: [facility] PRIMARY CARE    Assessment & Plan  Iron deficiency anemia, unspecified iron deficiency anemia type  Ferritin 29, patient on PO iron for 2 months, will continue for 1 more montha nd then recheck ferritin level. Ideally ferritin should be greater than 70. If not at goal, would recommend 3 IV iron infusions over the course of 3 weeks. Follow up pending repeat lab in 1 month         Orders:    Ferritin; Future    Acute on chronic systolic (congestive) heart failure (HCC)  Wt Readings from Last 3 Encounters:   25 93.3 kg (205 lb 11 oz)   25 93.7 kg (206 lb 9.6 oz)   25 95.9 kg (211 lb 6.7 oz)     Follows very closely with cardiology - reviewed recent note:   stop Imdur, prn torsemide, compression stockings, prn midodrine 2.5mg for SBP<85, iron replacement    Patient doing well and euvolemic on exam at this time.         Orders:    sacubitril-valsartan (ENTRESTO) 24-26 MG TABS; Take 1 tablet by mouth in the morning and 1 tablet before bedtime.    Hypotension due to hypovolemia  Improved with stopping daily torsemide and going prn only  Continue compression stockings  Midodrine prn per cardiology           Return for Next scheduled follow up.      History of Present Illness     Transitional Care Management Review:   Vesna Langston is a 65 y.o. female here for TCM follow up.     During the TCM phone call patient stated:  TCM Call (since 2025)       Date and time call was made  2025 11:05 AM    Hospital care reviewed  Records reviewed    Patient was hospitialized at  [facility]    Date of Admission  25    Date of discharge  25    Diagnosis  Acute on chronic systolic (congestive) heart failure (HCC)    Disposition  Home          TCM Call (since 2025)       None         Hospital course:  was hospitalized 6/21/2025 due to acute heart failure cardiology was on board.  Patient received IV Lasix for a few days and had marked improvement was complicated by hypotensive episodes and Entresto was decreased, isosorbide was decreased.  Today medically cleared for discharge advised follow-up with cardiology.  Patient was doing laps around the [place] and had marked improvement since admission.  Decrease the dose of Entresto, torsemide 20 mg once daily, follow-up with cardiology, BMP 1 week    She was then seen last week with hypotension and dizziness. I had her hold the torsemide at that time.     Currently she reports:   She saw cardiology yesterday - reviewed their note: stop Imdur, prn torsemide, compression stockings, prn midodrine 2.5mg for SBP<85, IV Iron infusion    She has been taking goal iron for a few months now since ferritin was below goal at 29, ideally this would be closer to 80 or above. Will recheck at this time, if level still not at goal, would recommend IV iron infusion.       HPI  Review of Systems   Constitutional:  Negative for fever.   Respiratory:  Negative for chest tightness and shortness of breath.    Cardiovascular:  Negative for chest pain.   Gastrointestinal:  Negative for nausea and vomiting.   Neurological:  Negative for dizziness and light-headedness.     Objective   /65 (BP Location: Right arm, Patient Position: Sitting, Cuff Size: Standard)   Pulse 85   Wt 93.3 kg (205 lb 11 oz)   SpO2 98%   BMI 31.27 kg/m²     Physical Exam  Vitals reviewed.   Constitutional:       General: She is not in acute distress.     Appearance: She is obese. She is not ill-appearing.     Cardiovascular:      Rate and Rhythm: Normal rate.   Pulmonary:      Effort: Pulmonary effort is normal.      Breath sounds: Normal breath sounds. No wheezing, rhonchi or rales.     Musculoskeletal:      Right lower leg: No edema.      Left lower leg: No edema.     Neurological:       Mental Status: She is alert.     Psychiatric:         Mood and Affect: Mood normal.         Behavior: Behavior normal.       Medications have been reviewed by provider in current encounter

## 2025-07-09 NOTE — ASSESSMENT & PLAN NOTE
Wt Readings from Last 3 Encounters:   07/09/25 93.3 kg (205 lb 11 oz)   07/08/25 93.7 kg (206 lb 9.6 oz)   07/03/25 95.9 kg (211 lb 6.7 oz)     Follows very closely with cardiology - reviewed recent note:   stop Imdur, prn torsemide, compression stockings, prn midodrine 2.5mg for SBP<85, iron replacement    Patient doing well and euvolemic on exam at this time.         Orders:    sacubitril-valsartan (ENTRESTO) 24-26 MG TABS; Take 1 tablet by mouth in the morning and 1 tablet before bedtime.

## 2025-07-09 NOTE — ASSESSMENT & PLAN NOTE
Ferritin 29, patient on PO iron for 2 months, will continue for 1 more montha nd then recheck ferritin level. Ideally ferritin should be greater than 70. If not at goal, would recommend 3 IV iron infusions over the course of 3 weeks. Follow up pending repeat lab in 1 month         Orders:    Ferritin; Future

## 2025-07-10 ENCOUNTER — OFFICE VISIT (OUTPATIENT)
Age: 65
End: 2025-07-10
Payer: COMMERCIAL

## 2025-07-10 VITALS
OXYGEN SATURATION: 98 % | HEIGHT: 68 IN | WEIGHT: 209 LBS | BODY MASS INDEX: 31.67 KG/M2 | SYSTOLIC BLOOD PRESSURE: 108 MMHG | RESPIRATION RATE: 16 BRPM | DIASTOLIC BLOOD PRESSURE: 70 MMHG | HEART RATE: 74 BPM | TEMPERATURE: 98 F

## 2025-07-10 DIAGNOSIS — I50.20 HFREF (HEART FAILURE WITH REDUCED EJECTION FRACTION) (HCC): ICD-10-CM

## 2025-07-10 DIAGNOSIS — G47.33 OSA (OBSTRUCTIVE SLEEP APNEA): Primary | ICD-10-CM

## 2025-07-10 PROCEDURE — 99214 OFFICE O/P EST MOD 30 MIN: CPT | Performed by: STUDENT IN AN ORGANIZED HEALTH CARE EDUCATION/TRAINING PROGRAM

## 2025-07-10 RX ORDER — ATORVASTATIN CALCIUM 80 MG/1
80 TABLET, FILM COATED ORAL EVERY EVENING
Qty: 90 TABLET | Refills: 1 | Status: SHIPPED | OUTPATIENT
Start: 2025-07-10

## 2025-07-10 NOTE — PATIENT INSTRUCTIONS
Plan:  Continue AutoPAP at settings of 11-15 cmH2O  Remember to clean your mask and equipment regularly, as directed.  You should be eligible for new supplies approximately every 3-6 months, depending on your insurance coverage. Contact your Durable Medical Equipment (DME) company for new supplies as needed.  Practice good Sleep Hygiene, as outlined below.  Follow up in 3-6 months.      General PAP Information:  Care and Maintenance  Headgear should be washed as needed. Daily inspection and weekly washings are recommended. Do not disassemble the straps. Machine wash in warm water, making sure to attach Velcro hooks and tabs before washing. Line dry or machine dry on a low setting.  Masks should be washed every day. Daily inspection is recommended. Leave the mask and tubing attached. Gently wash the mask with a soft cloth using warm water and mild detergent, concentrating on the mask cushion flaps. DO NOT use alcohol or bleach. Rinse thoroughly and air dry.  Tubing and headgear should be washed weekly. Daily inspection is recommended. Wash in warm water and mild detergent and rinse thoroughly. Hook the tubing to the machine and blow until dry.  Humidifier should be washed daily and filled with DISTILLED water before use. Wash with warm water and mild detergent. Rinse thoroughly and air dry.  Disposable filters should be replaced once a month. Wash reusable foam filters with warm water and mild detergent at least once a month. Rinse thoroughly and dry with paper towels.  Avoid  that contain fragrance or conditioners, as these will leave a residue.  NEVER iron any soft goods.      CMS Requirements    Your insurance requires a face-to-face follow up visit within a 31-90 day period after starting CPAP.  Your insurance requires compliance with CPAP, which is at least 4 hours per night for 70% of the time. This must be done over a 30 day period and must occur within the initial 31-90 day period after starting  CPAP.  Your insurance also requires at least yearly follow ups to continue to pay for CPAP supplies.       PAP Supply Guidelines    Below are the guidelines for reordering your supplies. You will be responsible for your deductible, co payments, and out of pocket expenses.    Item Frequency   Nasal Mask (no headgear) 1 every 3 months   Nasal Mask Cushion 1 every 2 weeks   Full Face Mask (no headgear) 1 every 3 months   Full Face Mask Cushion 1 every month   Nasal Pillows Cushion 1 every 2 weeks   Headgear 1 every 6 months   hin Strap 1 every 6 months   samantha 1 every 3 months   Filters: Reusable 1 every 6 months   Filters: Disposable 1 every 2 weeks   Humidifier Chamber(disposable) 1 every 6 months           Good Sleep Hygiene  Wake up at the same time every day, even on the weekends.  Use your bed for sleep and intimacy only.  If you have been in bed awake for 30 minutes, get up and leave the bedroom. Choose a dull activity not involving a blue screen (TV, computer, handheld devices). Go back to bed when you feel sleepy.  Avoid caffeine, nicotine and alcohol before you go to bed.  Avoid large meals before you go to bed.  Avoid using screens (computers, tablets, smartphones, etc.) for at least 1 hour before bedtime  Exercise regularly, but do not exercise right before you go to bed.  Avoid daytime naps. If you do take a nap, sleep for 20-40 minutes, and not after 2pm.

## 2025-07-10 NOTE — ASSESSMENT & PLAN NOTE
Vesna is a very pleasant 65-year-old woman with a PMHx of CAD status post MI with stent and ICD placement and subsequent ischemic cardiomyopathy and HFrEF (EF 30% 5/2024), atrial flutter on Eliquis, chronic low back pain who presents in follow up for POP (AHI 14.3, O2 lou 84% 9/2024).  Unfortunately, she has had several health complications over the past several months, as outlined below, which is significantly limited her ability to use the device.  Despite this, she remains quite motivated to resume use, given the significant benefit she had when she was able to utilize it.  She was having some slight difficulty with attaching the headgear to the mask today.    Compliance report reviewed and analyzed  Continue AutoPAP at settings of 11-15 cmH2O  Attempted (and appeared to) correctly attach the mask to the headgear today. If this does not stay, she may need to reach out to her DME.   Prescription for new supplies ordered today  Reviewed CMS/insurance requirements and resupply guidelines  Information provided on the above as well as general maintenance steps  Recommended maintaining good Sleep Hygiene    Orders:    PAP DME Resupply/Reorder

## 2025-07-10 NOTE — PROGRESS NOTES
Name: Vesna Langston      : 1960      MRN: [id number]  Encounter Provider: Genaro Caceres DO  Encounter Date: 7/10/2025   Encounter department: [facility] SLEEP MEDICINE [place]  :  Assessment & Plan  POP (obstructive sleep apnea)  Vesna is a very pleasant 65-year-old woman with a PMHx of CAD status post MI with stent and ICD placement and subsequent ischemic cardiomyopathy and HFrEF (EF 30% 2024), atrial flutter on Eliquis, chronic low back pain who presents in follow up for POP (AHI 14.3, O2 lou 84% 2024).  Unfortunately, she has had several health complications over the past several months, as outlined below, which is significantly limited her ability to use the device.  Despite this, she remains quite motivated to resume use, given the significant benefit she had when she was able to utilize it.  She was having some slight difficulty with attaching the headgear to the mask today.    Compliance report reviewed and analyzed  Continue AutoPAP at settings of 11-15 cmH2O  Attempted (and appeared to) correctly attach the mask to the headgear today. If this does not stay, she may need to reach out to her DME.   Prescription for new supplies ordered today  Reviewed CMS/insurance requirements and resupply guidelines  Information provided on the above as well as general maintenance steps  Recommended maintaining good Sleep Hygiene    Orders:    PAP DME Resupply/Reorder    HFrEF (heart failure with reduced ejection fraction) ([facility])  Wt Readings from Last 3 Encounters:   07/10/25 94.8 kg (209 lb)   25 93.3 kg (205 lb 11 oz)   25 93.7 kg (206 lb 9.6 oz)     Reviewed the importance of treating SDB on cardiovascular risk reduction (including but not limited to impaired BP control, risk of heart attack, CHF, and both initial occurrence of A-fib and recurrence of A-fib following ablation or similar intervention)  Defer primary/medical management of patient's CHF per cardiology and/or patient's  PCP.          Orders:    PAP DME Resupply/Reorder        Follow-up:  She will Return for Follow up in 3-6 months..      ________________________________________________________________________________________________    Per Last Visit Note (Date: 3/13/2025):  POP (obstructive sleep apnea)  Vesna is a very pleasant 64-year-old woman with a PMHx of CAD status post MI with stent and ICD placement and subsequent ischemic cardiomyopathy and HFrEF (EF 30% 5/2024), atrial flutter on Eliquis, chronic low back pain who presents in follow up for POP (AHI 14.3, O2 lou 84% 9/2024).  She does endorse some benefit from the device, however she is still having difficulties with leak and a sensation of too much pressure.  Reassuringly, it does not appear that her pressure settings are not adequate, however she does have high leak, which likely is contributing substantially to her symptoms and resulting in a supratherapeutic AHI.  She is also having difficulties with her DME, and wishes to switch it.       Compliance report reviewed and analyzed  Continue AutoPAP at settings of 11-15 cmH2O  Mask fitting ordered today  Also reviewed and provided links to CPAP pillows  Prescription for new supplies ordered today  Per patient's request, will change DME to [facility].  Reviewed CMS/insurance requirements and resupply guidelines  Information provided on the above as well as general maintenance steps  Recommended maintaining good Sleep Hygiene  She did also endorse some aerophagia symptoms; if these continue despite alternate masks, she may benefit from a BiPAP titration study in the future.     Orders:    PAP DME Resupply/Reorder    Mask fitting only; Future     HFrEF (heart failure with reduced ejection fraction) (HCC)      Wt Readings from Last 3 Encounters:   03/13/25 103 kg (227 lb)   03/11/25 99.3 kg (219 lb)   03/10/25 99.3 kg (219 lb)         Reviewed the importance of treating SDB on cardiovascular risk reduction (including  "but not limited to risk of heart attack, CHF, and both initial occurrence of A-fib and recurrence of A-fib following ablation or similar intervention)  Defer primary/medical management of patient's CHF per cardiology and/or patient's PCP.            Sleep Studies:  -Split Night 9/23/2024: BMI: 32.8. DIAGNOSTIC: TST: 122 minutes, Sleep efficiency: 91.3%. Sleep Onset Latency: 0.7 minutes, REM Onset Latency: 83 minutes. AHI: 14.3, Supine AHI: --, REM AHI: 26.4. O2 lou: 84%, Time below 90%: 13.8 minutes. PLM Index: 0, PLM Arousal Index: 0. THERAPEUTIC: Titration started at 4 cmH2O, titrated to 11 cmH2O. Best pressure noted to be 11 cmH2O. O2 lou was still 88% at final pressure.     -Echocardiogram 6/23/2025: LVEF: 45%    ________________________________________________________________________________________________      Interval History: Vesna Langston is a 65 y.o. female with a PMHx of CAD status post MI with stent and ICD placement and subsequent ischemic cardiomyopathy and HFrEF (EF 30% 5/2024), atrial flutter on Eliquis, chronic low back pain who presents in follow up for POP (AHI 14.3, O2 lou 84% 9/2024).    Still on Ozempic, now at 0.5mg (higher doses made her ill).    SDB:  -Current experience with PAP Therapy: Does endorse benefit when she can use it, and is very motivated to use it again.    -Was using up until 5/1, then had back surgery (7 hours), and was in the hospital for 2 weeks. Then came home and developed a sinus infection, then bronchitis (which lasted a month), now that has cleared up after starting steroids.    -Also started retaining water (10 lbs of water in her chest), was in hospital for 10 days.   -Mask type: Nasal with chinstrap  -Difficulties with mask: \"the nose piece won't stay hooked in to the hose.\"  -Device: ResMed AirSense 10; received 1/2024.  -Difficulties with device: Denies  -DME: [employer] [facility]  -Compliance:              Sitting and reading: (Patient-Rptd) (P) Would never " doze  Watching TV: (Patient-Rptd) (P) Slight chance of dozing  Sitting, inactive in a public place (e.g. a theatre or a meeting): (Patient-Rptd) (P) Would never doze  As a passenger in a car for an hour without a break: (Patient-Rptd) (P) Slight chance of dozing  Lying down to rest in the afternoon when circumstances permit: (Patient-Rptd) (P) Slight chance of dozing  Sitting and talking to someone: (Patient-Rptd) (P) Would never doze  Sitting quietly after a lunch without alcohol: (Patient-Rptd) (P) Would never doze  In a car, while stopped for a few minutes in traffic: (Patient-Rptd) (P) Would never doze  Total score: (Patient-Rptd) (P) 3     Review of Systems  Pertinent positives/negatives included in HPI and also as noted:     Current Outpatient Medications on File Prior to Visit   Medication Sig Dispense Refill    acetaminophen (TYLENOL) 325 mg tablet Take 2 tablets (650 mg total) by mouth every 6 (six) hours as needed for mild pain      albuterol (ProAir HFA) 90 mcg/act inhaler Inhale 2 puffs every 6 (six) hours as needed for wheezing 8.5 g 0    apixaban (ELIQUIS) 5 mg Take 1 tablet (5 mg total) by mouth 2 (two) times a day 180 tablet 1    atorvastatin (LIPITOR) 80 mg tablet TAKE 1 TABLET BY MOUTH EVERY DAY IN THE EVENING 90 tablet 1    clopidogrel (PLAVIX) 75 mg tablet Take 1 tablet (75 mg total) by mouth daily 90 tablet 3    diphenhydrAMINE (BENADRYL) 25 mg capsule Take 25 mg by mouth every 6 (six) hours as needed for itching      dofetilide (TIKOSYN) 125 mcg capsule Take 1 capsule (125 mcg total) by mouth every 12 (twelve) hours 180 capsule 3    Empagliflozin (Jardiance) 10 MG TABS tablet Take 1 tablet (10 mg total) by mouth every morning 90 tablet 5    ergocalciferol (VITAMIN D2) 50,000 units Take 1 capsule (50,000 Units total) by mouth once a week 12 capsule 0    ferrous gluconate (FERGON) 324 mg tablet Take 1 tablet (324 mg total) by mouth every other day 90 tablet 0    fluticasone (FLONASE) 50 mcg/act  nasal spray 1 spray into each nostril daily 9.9 mL 0    HYDROcodone-acetaminophen (NORCO) 5-325 mg per tablet Take 1 tablet by mouth every 8 (eight) hours as needed for pain May cause drowsiness and dizziness. This medication does contain tylenol/acetaminophen, so will need to limit any supplemental tylenol intake. Do not exceed 3000 mg (3g) of tylenol/acetaminophen every 24 hours. Max Daily Amount: 3 tablets 30 tablet 0    levothyroxine (Synthroid) 75 mcg tablet Take 1 tablet (75 mcg total) by mouth daily 90 tablet 1    meclizine (ANTIVERT) 25 mg tablet Take 1 tablet (25 mg total) by mouth every 8 (eight) hours as needed for dizziness 15 tablet 0    methylPREDNISolone 4 MG tablet therapy pack Use as directed on package 21 each 0    metoprolol succinate (TOPROL-XL) 25 mg 24 hr tablet Take 2 tablets (50 mg total) by mouth daily at bedtime 180 tablet 5    midodrine (PROAMATINE) 2.5 mg tablet Take 1 tablet (2.5 mg total) by mouth if needed (if systolic blood pressure is less than 85. take every 8 hours as needed) 30 tablet 2    Multiple Vitamin (multivitamin) tablet Take 1 tablet by mouth in the morning.      pantoprazole (PROTONIX) 40 mg tablet TAKE 1 TABLET BY MOUTH 2 TIMES A DAY BEFORE MEALS. 180 tablet 1    potassium chloride (Klor-Con M20) 20 mEq tablet Take 1 tablet (20 mEq total) by mouth if needed (on lasix days) 90 tablet 1    sacubitril-valsartan (ENTRESTO) 24-26 MG TABS Take 1 tablet by mouth in the morning and 1 tablet before bedtime. 60 tablet 0    semaglutide, 0.25 or 0.5 mg/dose, (Ozempic, 0.25 or 0.5 MG/DOSE,) 2 mg/3 mL injection pen Inject 0.75 mL (0.5 mg total) under the skin every 7 days 0.25 mg under the skin every 7 days for 4 doses (28 days), THEN 0.5 mg under the skin every 7 days 9 mL 0    torsemide (DEMADEX) 20 mg tablet Take 1 tablet (20 mg total) by mouth if needed (if weight up 4 lbs rapidly) 90 tablet 0     No current facility-administered medications on file prior to visit.      Objective  "  /70 (BP Location: Right arm, Patient Position: Sitting, Cuff Size: Large)   Pulse 74   Temp 98 °F (36.7 °C) (Temporal)   Resp 16   Ht 5' 8\" (1.727 m)   Wt 94.8 kg (209 lb)   SpO2 98%   BMI 31.78 kg/m²     Neck Circumference: 15.25  Physical Exam  PHYSICAL EXAMINATION:  Vital Signs: /70 (BP Location: Right arm, Patient Position: Sitting, Cuff Size: Large)   Pulse 74   Temp 98 °F (36.7 °C) (Temporal)   Resp 16   Ht 5' 8\" (1.727 m)   Wt 94.8 kg (209 lb)   SpO2 98%   BMI 31.78 kg/m²     Constitutional: NAD, well appearing   Mental Status: AAOx3  Skin: Warm, dry, no rashes noted   Eyes: PERRL, normal conjunctiva  Chest: No evidence of respiratory distress, no accessory muscle use; no evidence of peripheral cyanosis  Abdomen: Soft, NT/ND  Extremities: No digital clubbing or pedal edema - compression socks (up to knees) present bilaterally.   Neuro: Strength 5/5 throughout, sensation grossly intact    Data  Lab Results   Component Value Date    HGB 11.5 07/01/2025    HCT 39.3 07/01/2025    MCV 84 07/01/2025      Lab Results   Component Value Date    GLUCOSE 168 (H) 04/07/2024    CALCIUM 10.1 07/02/2025    K 4.1 07/02/2025    CO2 25 07/02/2025     07/02/2025    BUN 13 07/02/2025    CREATININE 0.87 07/02/2025     Lab Results   Component Value Date    IRON 28 (L) 05/08/2025    TIBC 376.6 05/08/2025    FERRITIN 29 (L) 05/08/2025     Lab Results   Component Value Date    AST 28 07/01/2025    ALT 12 07/01/2025         Electronically signed by:    Genaro Caceres DO  Board-Certified Neurology and Sleep Medicine  [facility]  07/10/25  "

## 2025-07-10 NOTE — ASSESSMENT & PLAN NOTE
Wt Readings from Last 3 Encounters:   07/10/25 94.8 kg (209 lb)   07/09/25 93.3 kg (205 lb 11 oz)   07/08/25 93.7 kg (206 lb 9.6 oz)     Reviewed the importance of treating SDB on cardiovascular risk reduction (including but not limited to impaired BP control, risk of heart attack, CHF, and both initial occurrence of A-fib and recurrence of A-fib following ablation or similar intervention)  Defer primary/medical management of patient's CHF per cardiology and/or patient's PCP.          Orders:    PAP DME Resupply/Reorder

## 2025-07-11 ENCOUNTER — TELEPHONE (OUTPATIENT)
Dept: SLEEP CENTER | Facility: CLINIC | Age: 65
End: 2025-07-11

## 2025-07-11 ENCOUNTER — TELEPHONE (OUTPATIENT)
Dept: CARDIOLOGY CLINIC | Facility: CLINIC | Age: 65
End: 2025-07-11

## 2025-07-11 DIAGNOSIS — I50.23 ACUTE ON CHRONIC SYSTOLIC (CONGESTIVE) HEART FAILURE (HCC): ICD-10-CM

## 2025-07-11 RX ORDER — SACUBITRIL AND VALSARTAN 24; 26 MG/1; MG/1
1 TABLET, FILM COATED ORAL 2 TIMES DAILY
Qty: 60 TABLET | Refills: 17 | Status: SHIPPED | OUTPATIENT
Start: 2025-07-11 | End: 2027-01-02

## 2025-07-11 NOTE — TELEPHONE ENCOUNTER
Spoke with pt. Pt is feeling much better. Pt increased fluid intake. Continues with salt restrictions. Logging BP. Blood pressure has been better since increase of fluids. Weights 209.4. Pt prescribed torsemide 20mg and takes when needed. Energy levels have increased.

## 2025-07-15 ENCOUNTER — TELEPHONE (OUTPATIENT)
Age: 65
End: 2025-07-15

## 2025-07-15 ENCOUNTER — OFFICE VISIT (OUTPATIENT)
Dept: FAMILY MEDICINE CLINIC | Facility: CLINIC | Age: 65
End: 2025-07-15
Payer: COMMERCIAL

## 2025-07-15 VITALS
OXYGEN SATURATION: 98 % | BODY MASS INDEX: 31.58 KG/M2 | DIASTOLIC BLOOD PRESSURE: 54 MMHG | SYSTOLIC BLOOD PRESSURE: 92 MMHG | WEIGHT: 207.67 LBS | HEART RATE: 68 BPM

## 2025-07-15 DIAGNOSIS — F41.1 GENERALIZED ANXIETY DISORDER: Primary | ICD-10-CM

## 2025-07-15 PROCEDURE — 99213 OFFICE O/P EST LOW 20 MIN: CPT | Performed by: FAMILY MEDICINE

## 2025-07-15 RX ORDER — ESCITALOPRAM OXALATE 10 MG/1
10 TABLET ORAL DAILY
Qty: 30 TABLET | Refills: 1 | Status: SHIPPED | OUTPATIENT
Start: 2025-07-15 | End: 2025-07-15

## 2025-07-17 ENCOUNTER — PATIENT OUTREACH (OUTPATIENT)
Dept: CASE MANAGEMENT | Facility: OTHER | Age: 65
End: 2025-07-17

## 2025-07-17 NOTE — PROGRESS NOTES
Outpatient Care Management Note    CM outreach due reminder: RN CM scheduled patient outreach for care management follow up. Chart reviewed.  Noted that patient had an appointment with PCP on 7/15/25.    RN CM placed call that was answered by patient.  Patient states that she will need to call RN CM back at another time and call ended.    RN CM will await return call. If no callback from patient, RN CM will schedule another outreach attempt for tomorrow.

## 2025-07-17 NOTE — PROGRESS NOTES
Outpatient Care Management Note    Received return call from patient who left VM message as RN CM was on phone at time of incoming call.   RN CM returned call and spoke with patient who states that everything is going very well.  She states that she is following closely with her providers.  Patient states that she's been having lower blood pressures, which the doctor is aware of, and states that they started her on Midodrine as needed if SBP goes below 85.  Patient states that she has not needed to take this medication.    She states that she checks her blood pressure 3-4 times a day. She states that this morning BP was 98/63 and this afternoon at doctor's appointment it was 90/58.  She is monitoring weight daily and states that weight remains stable, with no weight increases of 2-3 lbs overnight or 5 lbs in a week.  She denies any swelling or shortness of breath.    Following low sodium diet:   states doctor instructed her to add a little salt to diet to help with low blood pressure  Following fluid restriction:  states that she was also instructed to add a little more fluid. She states that she has added 6 additional oz of coffee in the morning.  Hospital discharge weight:   210 lbs, 1.6 oz  Weighing daily:   yes           Weight log: yes        Weight today: weighing daily, did not share today's weight but states that it has only been fluctuating about 0.5 lbs  Monitoring symptoms: yes  Any current symptoms: denies any current concerning symptoms  When to call provider: reviewed  Escalation plan: reviewed- patient verbalized understanding.  HF education reviewed/reinforced including low sodium diet, fluid restriction, activity, symptoms of decompensation and when and who to call.   Cardiology follow up appointment: 8/1/25  PCP follow up appointment: 8/15/25  Transportation: drives self    RN CM will schedule next outreach for one week

## 2025-07-18 ENCOUNTER — TELEPHONE (OUTPATIENT)
Dept: CARDIOLOGY CLINIC | Facility: CLINIC | Age: 65
End: 2025-07-18

## 2025-07-18 NOTE — TELEPHONE ENCOUNTER
Spoke with pt. She feels pretty good. Bp is slightly improved. Enough that the sluggish feeling is gone. She is drinking more fluid.  She is aware of appts in August.

## 2025-07-23 ENCOUNTER — DOCUMENTATION (OUTPATIENT)
Dept: CARDIOLOGY CLINIC | Facility: CLINIC | Age: 65
End: 2025-07-23

## 2025-07-25 ENCOUNTER — PATIENT OUTREACH (OUTPATIENT)
Dept: CASE MANAGEMENT | Facility: OTHER | Age: 65
End: 2025-07-25

## 2025-07-25 ENCOUNTER — TELEPHONE (OUTPATIENT)
Dept: CARDIOLOGY CLINIC | Facility: CLINIC | Age: 65
End: 2025-07-25

## 2025-07-25 ENCOUNTER — OFFICE VISIT (OUTPATIENT)
Dept: OBGYN CLINIC | Facility: HOSPITAL | Age: 65
End: 2025-07-25

## 2025-07-25 ENCOUNTER — HOSPITAL ENCOUNTER (OUTPATIENT)
Dept: RADIOLOGY | Facility: HOSPITAL | Age: 65
Discharge: HOME | End: 2025-07-25
Attending: ORTHOPAEDIC SURGERY
Payer: COMMERCIAL

## 2025-07-25 DIAGNOSIS — Z98.1 S/P LUMBAR FUSION: Primary | ICD-10-CM

## 2025-07-25 DIAGNOSIS — Z98.1 S/P LUMBAR FUSION: ICD-10-CM

## 2025-07-25 PROCEDURE — 72100 X-RAY EXAM L-S SPINE 2/3 VWS: CPT

## 2025-07-25 PROCEDURE — 99024 POSTOP FOLLOW-UP VISIT: CPT | Performed by: ORTHOPAEDIC SURGERY

## 2025-07-25 NOTE — PROGRESS NOTES
Name: Vesna Langston      : 1960       MRN: [id number]   Encounter Provider: Charles Mcrae MD   Encounter Date: 25  Encounter department: [facility] ORTHOP[facility] CARE SPECIAL[facility] ORTHOPEDIC SPINE SURGERY  POST OP NOTE     Vesna Langston  here today s/p navigated revision L3-S1 laminectomy/decompression, revision L3-S1 posterior instrumented spinal fusion with TLIF          Surgery date: 2025    Assessment & Plan  S/P lumbar fusion  See plan below  Orders:    XR spine lumbar 2 or 3 views injury; Future    Ambulatory referral to Physical Therapy; Future         Plan:  Patient was instructed to do the following   -Able to walk as much as tolerated. Continue to use assistive ambulatory device as indicated. Continue to wean out of LSO brace. No longer needs to wear LSO brace.  -Continue to limit excessive turning, twisting, bending. No lifting >25-30lbs.  -Continue with physical therapy to work on upper and lower body strengthening. Also continue to work on lumbar and core strengthening, ROM. Continue to maintain lumbar restrictions as discussed. New PT script placed.  -Able to drive as long as no longer taking narcotics.   -Take pain medication as prescribed if needed. No refills required at today's visit  -Work note provided in chart.   -Follow up in 3 months for 6 month post-op visit. Will obtain X-rays.   Vesna Langston was instructed to call the office with any concerns or questions.      History of Present Illness    Subjective: Preoperative symptoms were back, lower extremity pain, ambulatory dysfunction. Today, she reports improvement in pre-operative symptoms. Lower extremity pain resolved. Does continue with some left lower extremity numbness and weakness, however does report having return of some sensation/tingling in her left foot. Has been wearing LSO with increased activity and ambulation, otherwise has not been wearing it while at home. She does report back pain/soreness  after increased activity such as household chores and weed whacking. Describes her pain as more muscular in nature. She applies heat with some benefit. Taking Norco seldomly as needed. Continues with physical therapy with benefit. Has been working on upper extremity and core/back strengthening and recently started incorporating lower extremity strengthening exercises. Has been ambulating without much issue but does note her left leg does drag at times due to weakness. Uses cane for longer distances. Patient admits to compliance with activity restrictions.  Denies any fevers, chills, and night sweats.  No cough, no shortness of breath. No chest pain, no palpitations.  No dysphagia.    Post-Op Medications:  Hydrocodone - take as needed  75mg tid Lyrica - taking as prescribed  Resumed plavix and eliquis as prescribed      Physical Exam    Objective:  There were no vitals taken for this visit.    Strength:  Lower Extremity Motor Function     Right  Left    Iliopsoas  5/5  4/5    Quadriceps 5/5 4/5   Tibialis anterior  5/5  4/5    EHL  5/5  3+/5    Gastroc. muscle  5/5  2/5    Heel rise  5/5  2/5    Toe rise  5/5  2/5      Sensation: intact, diminished  DTR: intact  Gait: fluid, non-antalgic. Using cane for ambulation    Posterior lumbar incision healing extremely well. No signs of erythema, discharge, or purulence. There is no appreciable effusion.    Calf: soft, non tender, no swelling or erythema     Imaging:  I have reviewed and independently visualized the imaging studies (07/25/25)  myself and my interpretation is the following: Instrumentation in place and in good alignment.

## 2025-07-25 NOTE — PROGRESS NOTES
Name: Vesna Langston      : 1960       MRN: [id number]   Encounter Provider: Charles Mcrae MD   Encounter Date: 25  Encounter department: [facility] ORTHOPEDIC CARE SPECIALISTS [facility] ORTHOPEDIC SPINE SURGERY  POST OP NOTE     Vesna Langston  here today s/p navigated revision L3-S1 laminectomy/decompression, revision L3-S1 posterior instrumented spinal fusion with TLIF          Surgery date: 2025    Assessment & Plan  S/P lumbar fusion    Orders:    XR spine lumbar 2 or 3 views injury; Future       ***  Plan:  Patient was instructed to do the following   -Able to walk as much as tolerated. Continue to use assistive ambulatory device as indicated. LSO brace when ambulating.  -Continue to limit excessive turning, twisting, bending. No lifting >5-10lbs.  -Continue with physical therapy to work on upper and lower body strengthening. Will plan to start formal physical therapy to work on lumbar ROM, strengthening. Continue to maintain lumbar restrictions.   -Able to drive as long as no longer taking narcotics.   -Take pain medication as prescribed if needed. No refills required at today's visit  -Work note provided in chart.   -Follow up in 6 weeks for 3 month post-op visit. Will obtain X-rays  Vesna Langston was instructed to call the office with any concerns or questions.      History of Present Illness  ***  Subjective: Preoperative symptoms were back, lower extremity pain, ambulatory dysfunction. Today, she reports improvement in pre-operative symptoms. Pain overall well controlled. Lower extremity pain resolved. Does have some back soreness and muscle spasms several times a day after increased activity. Planning to order a heat/massage device on [employer]. She reports upper body strengthening exercises at physical therapy with benefit. Has recently started to incorporate lower extremity strengthening exercises as well. Does continue with some weakness and baseline left lower extremity  numbness, however does report having some return of feeling/tingling in her left big toe. She feels like she is able to stand up straighter and for longer periods of time. Walking has improved, she is walk 0.5 miles on the treadmill without issue. Wearing LSO brace and using cane as needed for ambulation. Has been vacuuming for a few minuets at a time without issue. Patient admits to compliance with activity restrictions.  Denies any fevers, chills, and night sweats.  No cough, no shortness of breath. No chest pain, no palpitations.  No dysphagia.    Post-Op Medications:  Hydrocodone - take as needed  75mg tid Lyrica - taking as prescribed  Resumed plavix and eliquis as prescribed      Physical Exam    Objective:  There were no vitals taken for this visit.    Strength:  Lower Extremity Motor Function     Right  Left    Iliopsoas  5/5  4/5    Quadriceps 5/5 4/5   Tibialis anterior  5/5  4/5    EHL  5/5  3+/5    Gastroc. muscle  5/5  2/5    Heel rise  5/5  2/5    Toe rise  5/5  2/5      Sensation: intact, diminished  DTR: intact  Gait: fluid, non-antalgic. Using cane for ambulation    Posterior lumbar incision healing extremely well. No signs of erythema, discharge, or purulence. There is no appreciable effusion.    Calf: soft, non tender, no swelling or erythema     Imaging:  I have reviewed and independently visualized the imaging studies (07/25/25)  myself and my interpretation is the following: Instrumentation in place and in good alignment.

## 2025-07-25 NOTE — LETTER
July 25, 2025     Patient: Vesna Langston  YOB: 1960  Date of Visit: 7/25/2025      To Whom it May Concern:    Vesna Langston is under my professional care. Vesna was seen in my office on 7/25/2025. Vesna may return to work on light duty on September 1st. No lifting >15 lbs. Continue to limit excessive turning, twisting, and bending. She should only work 4 hours a day until re-evaluation in office at her 6 month post-op visit. Pending how Vesna does after return to work, will adjust work note as necessary.    If you have any questions or concerns, please don't hesitate to call.         Sincerely,          Charles Mcrae MD        CC: No Recipients

## 2025-07-25 NOTE — PROGRESS NOTES
"Outpatient Care Management Note    CM outreach due reminder: HF patient. RN CM scheduled patient outreach for care management follow up. Chart reviewed.    RN CM placed call and spoke with patient.  Patient states that she is feeling pretty and and feels like everything is going very well.  She states that she saw Orthopedics today and was happy to report that they are going to allow her to return to [employer]. She states that she is not cleared to do any kind of weights but can use other types of equipment. She states that they also said that she is likely to be able to return to work 9/1/25. She states that she will see them before that date to get the final clearance.    Patient states that she continues to monitor her blood pressure at home and sends results to Cardiology.  She reports that for the most part her blood pressure has been pretty good. She did say that the other day her blood pressure was a \"little soft\", running around 86-88 systolic and added that she also felt a \"little off\" when this occurred.  She states that at the time she had another cup of coffee and some salted crackers and when she rechecked blood pressure her SBP had come up to over 100.  Patient continues to monitor her weight daily and reports that weight has been stable.  She states that she has been actively trying to lose some weight and is watching what she eats, trying to eat correctly. She states that her weight today was 207.6 lbs.  She denies having any symptoms of concern.    RN CM discussed ongoing care management with patient.  Patient states that she feels that she is doing very well and denies any concerns or needs for RN ROSANNE at this time. She continues to follow closely with providers and feels that she is in a good place.    RN CM will remove self from care team and close care management episode at this time.  RN CM contact information provided to patient.  "

## 2025-07-25 NOTE — TELEPHONE ENCOUNTER
Spoke with pt and she is doing well. She will call the office with any cardiac issues or questions.

## 2025-07-29 ENCOUNTER — CONSULT (OUTPATIENT)
Dept: PULMONOLOGY | Facility: CLINIC | Age: 65
End: 2025-07-29
Payer: COMMERCIAL

## 2025-07-29 VITALS
OXYGEN SATURATION: 97 % | TEMPERATURE: 98.5 F | SYSTOLIC BLOOD PRESSURE: 102 MMHG | DIASTOLIC BLOOD PRESSURE: 70 MMHG | BODY MASS INDEX: 31.37 KG/M2 | HEART RATE: 66 BPM | HEIGHT: 68 IN | WEIGHT: 207 LBS

## 2025-07-29 DIAGNOSIS — J42 CHRONIC BRONCHITIS, UNSPECIFIED CHRONIC BRONCHITIS TYPE (HCC): Primary | ICD-10-CM

## 2025-07-29 PROCEDURE — 99214 OFFICE O/P EST MOD 30 MIN: CPT | Performed by: INTERNAL MEDICINE

## 2025-08-01 ENCOUNTER — OFFICE VISIT (OUTPATIENT)
Dept: CARDIOLOGY CLINIC | Facility: CLINIC | Age: 65
End: 2025-08-01
Payer: COMMERCIAL

## 2025-08-01 ENCOUNTER — IN-CLINIC DEVICE VISIT (OUTPATIENT)
Dept: CARDIOLOGY CLINIC | Facility: CLINIC | Age: 65
End: 2025-08-01
Payer: COMMERCIAL

## 2025-08-01 VITALS
BODY MASS INDEX: 31.37 KG/M2 | DIASTOLIC BLOOD PRESSURE: 62 MMHG | HEART RATE: 70 BPM | SYSTOLIC BLOOD PRESSURE: 102 MMHG | HEIGHT: 68 IN | WEIGHT: 207 LBS

## 2025-08-01 DIAGNOSIS — Z87.891 HISTORY OF TOBACCO ABUSE: ICD-10-CM

## 2025-08-01 DIAGNOSIS — E78.2 MIXED HYPERLIPIDEMIA: ICD-10-CM

## 2025-08-01 DIAGNOSIS — Z95.810 S/P ICD (INTERNAL CARDIAC DEFIBRILLATOR) PROCEDURE: Chronic | ICD-10-CM

## 2025-08-01 DIAGNOSIS — Z95.5 S/P CORONARY ARTERY STENT PLACEMENT: Chronic | ICD-10-CM

## 2025-08-01 DIAGNOSIS — E66.09 CLASS 1 OBESITY DUE TO EXCESS CALORIES WITH SERIOUS COMORBIDITY AND BODY MASS INDEX (BMI) OF 34.0 TO 34.9 IN ADULT: ICD-10-CM

## 2025-08-01 DIAGNOSIS — I48.92 ATRIAL FLUTTER, PAROXYSMAL (HCC): Primary | ICD-10-CM

## 2025-08-01 DIAGNOSIS — I26.99 OTHER PULMONARY EMBOLISM WITHOUT ACUTE COR PULMONALE, UNSPECIFIED CHRONICITY (HCC): ICD-10-CM

## 2025-08-01 DIAGNOSIS — I50.20 HFREF (HEART FAILURE WITH REDUCED EJECTION FRACTION) (HCC): ICD-10-CM

## 2025-08-01 DIAGNOSIS — Z95.810 AICD (AUTOMATIC CARDIOVERTER/DEFIBRILLATOR) PRESENT: Primary | ICD-10-CM

## 2025-08-01 DIAGNOSIS — I25.5 ISCHEMIC CARDIOMYOPATHY: Chronic | ICD-10-CM

## 2025-08-01 DIAGNOSIS — Z86.79 HISTORY OF SUSTAINED VENTRICULAR TACHYCARDIA: Chronic | ICD-10-CM

## 2025-08-01 DIAGNOSIS — E11.9 TYPE 2 DIABETES MELLITUS WITHOUT COMPLICATION, WITHOUT LONG-TERM CURRENT USE OF INSULIN (HCC): ICD-10-CM

## 2025-08-01 DIAGNOSIS — Z86.711 HISTORY OF PULMONARY EMBOLISM: ICD-10-CM

## 2025-08-01 DIAGNOSIS — E03.9 ACQUIRED HYPOTHYROIDISM: ICD-10-CM

## 2025-08-01 DIAGNOSIS — J42 CHRONIC BRONCHITIS, UNSPECIFIED CHRONIC BRONCHITIS TYPE (HCC): ICD-10-CM

## 2025-08-01 DIAGNOSIS — E66.811 CLASS 1 OBESITY DUE TO EXCESS CALORIES WITH SERIOUS COMORBIDITY AND BODY MASS INDEX (BMI) OF 34.0 TO 34.9 IN ADULT: ICD-10-CM

## 2025-08-01 DIAGNOSIS — E66.01 OBESITY, MORBID (HCC): ICD-10-CM

## 2025-08-01 DIAGNOSIS — G47.33 OSA (OBSTRUCTIVE SLEEP APNEA): ICD-10-CM

## 2025-08-01 DIAGNOSIS — I25.10 CORONARY ARTERY DISEASE INVOLVING NATIVE HEART WITHOUT ANGINA PECTORIS, UNSPECIFIED VESSEL OR LESION TYPE: ICD-10-CM

## 2025-08-01 LAB
ATRIAL RATE: 70 BPM
P AXIS: 54 DEGREES
PR INTERVAL: 208 MS
QRS AXIS: 67 DEGREES
QRSD INTERVAL: 92 MS
QT INTERVAL: 432 MS
QTC INTERVAL: 467 MS
T WAVE AXIS: 91 DEGREES
VENTRICULAR RATE: 70 BPM

## 2025-08-01 PROCEDURE — 93283 PRGRMG EVAL IMPLANTABLE DFB: CPT | Performed by: INTERNAL MEDICINE

## 2025-08-01 PROCEDURE — 93000 ELECTROCARDIOGRAM COMPLETE: CPT | Performed by: INTERNAL MEDICINE

## 2025-08-01 PROCEDURE — 99214 OFFICE O/P EST MOD 30 MIN: CPT | Performed by: INTERNAL MEDICINE

## 2025-08-06 DIAGNOSIS — F41.1 GENERALIZED ANXIETY DISORDER: ICD-10-CM

## 2025-08-08 ENCOUNTER — OFFICE VISIT (OUTPATIENT)
Dept: CARDIOLOGY CLINIC | Facility: CLINIC | Age: 65
End: 2025-08-08
Payer: COMMERCIAL

## 2025-08-08 VITALS
BODY MASS INDEX: 31.02 KG/M2 | OXYGEN SATURATION: 97 % | SYSTOLIC BLOOD PRESSURE: 100 MMHG | HEART RATE: 70 BPM | DIASTOLIC BLOOD PRESSURE: 62 MMHG | HEIGHT: 68 IN | WEIGHT: 204.7 LBS

## 2025-08-08 DIAGNOSIS — Z95.5 S/P CORONARY ARTERY STENT PLACEMENT: ICD-10-CM

## 2025-08-08 DIAGNOSIS — I50.20 HFREF (HEART FAILURE WITH REDUCED EJECTION FRACTION) (HCC): Primary | ICD-10-CM

## 2025-08-08 DIAGNOSIS — I48.92 ATRIAL FLUTTER, PAROXYSMAL (HCC): ICD-10-CM

## 2025-08-08 PROCEDURE — 99214 OFFICE O/P EST MOD 30 MIN: CPT | Performed by: STUDENT IN AN ORGANIZED HEALTH CARE EDUCATION/TRAINING PROGRAM

## 2025-08-11 ENCOUNTER — APPOINTMENT (OUTPATIENT)
Dept: LAB | Facility: HOSPITAL | Age: 65
End: 2025-08-11
Payer: COMMERCIAL

## 2025-08-15 ENCOUNTER — OFFICE VISIT (OUTPATIENT)
Dept: FAMILY MEDICINE CLINIC | Facility: CLINIC | Age: 65
End: 2025-08-15
Payer: COMMERCIAL

## 2025-08-15 PROBLEM — D50.8 IRON DEFICIENCY ANEMIA SECONDARY TO INADEQUATE DIETARY IRON INTAKE: Status: ACTIVE | Noted: 2025-08-15

## 2025-08-18 ENCOUNTER — OFFICE VISIT (OUTPATIENT)
Dept: GASTROENTEROLOGY | Facility: CLINIC | Age: 65
End: 2025-08-18
Payer: COMMERCIAL

## 2025-08-18 VITALS
DIASTOLIC BLOOD PRESSURE: 66 MMHG | SYSTOLIC BLOOD PRESSURE: 100 MMHG | TEMPERATURE: 97.4 F | WEIGHT: 204 LBS | HEIGHT: 68 IN | BODY MASS INDEX: 30.92 KG/M2

## 2025-08-18 DIAGNOSIS — Z87.11 HISTORY OF PEPTIC ULCER DISEASE: Primary | ICD-10-CM

## 2025-08-18 DIAGNOSIS — D50.9 IRON DEFICIENCY ANEMIA, UNSPECIFIED IRON DEFICIENCY ANEMIA TYPE: ICD-10-CM

## 2025-08-18 DIAGNOSIS — Z12.11 SCREENING FOR COLON CANCER: ICD-10-CM

## 2025-08-18 PROCEDURE — 99214 OFFICE O/P EST MOD 30 MIN: CPT | Performed by: PHYSICIAN ASSISTANT

## 2025-08-18 RX ORDER — PANTOPRAZOLE SODIUM 40 MG/1
40 TABLET, DELAYED RELEASE ORAL
Qty: 90 TABLET | Refills: 4 | Status: SHIPPED | OUTPATIENT
Start: 2025-08-18

## (undated) DEVICE — RADIFOCUS OPTITORQUE ANGIOGRAPHIC CATHETER: Brand: OPTITORQUE

## (undated) DEVICE — TOOL MR8-14MH30 MR8 14CM MATCH 3MM: Brand: MIDAS REX MR8

## (undated) DEVICE — DISPOSABLE EQUIPMENT COVER: Brand: SMALL TOWEL DRAPE

## (undated) DEVICE — KERLIX BANDAGE ROLL: Brand: KERLIX

## (undated) DEVICE — DRESSING MEPORE FILM ADHESIVE 4 X 5IN

## (undated) DEVICE — MONITORING SPINAL IMPULSE CASE FEE

## (undated) DEVICE — TOWEL SURG XR DETECT GREEN STRL RFD

## (undated) DEVICE — INTENDED FOR TISSUE SEPARATION, AND OTHER PROCEDURES THAT REQUIRE A SHARP SURGICAL BLADE TO PUNCTURE OR CUT.: Brand: BARD-PARKER SAFETY BLADES SIZE 11, STERILE

## (undated) DEVICE — GLOVE INDICATOR UNDERGLOVE SZ 6 BLUE

## (undated) DEVICE — INSULATED BLADE ELECTRODE: Brand: EDGE

## (undated) DEVICE — LAPAROTOMY DRAPE WITH POUCHES: Brand: CONVERTORS

## (undated) DEVICE — PENCILETTE PUSH BUTTON COATED

## (undated) DEVICE — NC TREK NEO™ CORONARY DILATATION CATHETER 3.00 MM X 15 MM / RAPID-EXCHANGE: Brand: NC TREK NEO™

## (undated) DEVICE — OCCLUSIVE GAUZE STRIP,3% BISMUTH TRIBROMOPHENATE IN PETROLATUM BLEND: Brand: XEROFORM

## (undated) DEVICE — INSUFFLATION NEEDLE TO ESTABLISH PNEUMOPERITONEUM.: Brand: INSUFFLATION NEEDLE

## (undated) DEVICE — TRAY FOLEY 16FR URIMETER SURESTEP

## (undated) DEVICE — HI-TORQUE PILOT 50 GUIDE WIRE .014 STRAIGHT TIP 3.0 CM X 300 CM: Brand: HI-TORQUE PILOT

## (undated) DEVICE — DRAPE SHEET THREE QUARTER

## (undated) DEVICE — DRAPE C-ARMOUR

## (undated) DEVICE — SUT VICRYL PLUS 1 CT-1 27IN VCPP40D

## (undated) DEVICE — ANTIBACTERIAL VIOLET BRAIDED (POLYGLACTIN 910), SYNTHETIC ABSORBABLE SUTURE: Brand: COATED VICRYL

## (undated) DEVICE — THE ECHELON, ECHELON ENDOPATH™ AND ECHELON FLEX™ FAMILIES OF ENDOSCOPIC LINEAR CUTTERS AND RELOADS ARE STERILE, SINGLE PATIENT USE INSTRUMENTS THAT SIMULTANEOUSLY CUT AND STAPLE TISSUE. THERE ARE SIX STAGGERED ROWS OF STAPLES, THREE ON EITHER SIDE OF THE CUT LINE. THE 45 MM INSTRUMENTS HAVE A STAPLE LINE THATIS APPROXIMATELY 45 MM LONG AND A CUT LINE THAT IS APPROXIMATELY 42 MM LONG. THE SHAFT CAN ROTATE FREELY IN BOTH DIRECTIONS AND AN ARTICULATION MECHANISM ON ARTICULATING INSTRUMENTS ENABLES BENDING THE DISTAL PORTIONOF THE SHAFT TO FACILITATE LATERAL ACCESS OF THE OPERATIVE SITE.THE INSTRUMENTS ARE SHIPPED WITHOUT A RELOAD AND MUST BE LOADED PRIOR TO USE. A STAPLE RETAINING CAP ON THE RELOAD PROTECTS THE STAPLE LEG POINTS DURING SHIPPING AND TRANSPORTATION. THE INSTRUMENTS’ LOCK-OUT FEATURE IS DESIGNED TO PREVENT A USED RELOAD FROM BEING REFIRED.: Brand: ECHELON ENDOPATH

## (undated) DEVICE — CHLORAPREP HI-LITE 26ML ORANGE

## (undated) DEVICE — SUPPLY FEE STD

## (undated) DEVICE — NEURO PATTIES 1/2 X 3

## (undated) DEVICE — ADHESIVE SKIN HIGH VISCOSITY EXOFIN 1ML

## (undated) DEVICE — GLOVE INDICATOR PI UNDERGLOVE SZ 7.5 BLUE

## (undated) DEVICE — SUT MONOCRYL 4-0 PS-2 27 IN Y426H

## (undated) DEVICE — THE SUPERCROSS MICROCATHETER IS INTENDED TO BE USED IN CONJUNCTION WITH STEERABLE GUIDEWIRES TO ACCESS DISCRETE REGIONS OF THE CORONARY AND/OR PERIPHERAL VASCULATURE. IT MAY BE USED TO FACILITATE PLACEMENT AND EXCHANGE OF GUIDEWIRES AND OTHER INTERVENTIONAL DEVICES AND TO SUBSELECTIVELY INFUSE/DELIVER DIAGNOSTIC AND THERAPEUTIC AGENTS.: Brand: SUPERCROSS™ AT MICROCATHETER

## (undated) DEVICE — PINNACLE INTRODUCER SHEATH: Brand: PINNACLE

## (undated) DEVICE — TROCAR: Brand: KII® SLEEVE

## (undated) DEVICE — DRESSING MEPILEX FOAM BORDER FLEX 4 X 4IN

## (undated) DEVICE — LIGHT HANDLE COVER SLEEVE DISP BLUE STELLAR

## (undated) DEVICE — PACK PBDS LAP CHOLE RF

## (undated) DEVICE — EXOFIN PRECISION PEN HIGH VISCOSITY TOPICAL SKIN ADHESIVE: Brand: EXOFIN PRECISION PEN, 1G

## (undated) DEVICE — MASTISOL LIQ ADHESIVE 2/3ML

## (undated) DEVICE — THE ECHELON FLEX POWERED PLUS ARTICULATING ENDOSCOPIC LINEAR CUTTERS ARE STERILE, SINGLE PATIENT USE INSTRUMENTS THAT SIMULTANEOUSLYCUT AND STAPLE TISSUE. THERE ARE SIX STAGGERED ROWS OF STAPLES, THREE ON EITHER SIDE OF THE CUT LINE. THE ECHELON FLEX 45 POWERED PLUSINSTRUMENTS HAVE A STAPLE LINE THAT IS APPROXIMATELY 45 MM LONG AND A CUT LINE THAT IS APPROXIMATELY 42 MM LONG. THE SHAFT CAN ROTATE FREELYIN BOTH DIRECTIONS AND AN ARTICULATION MECHANISM ENABLES THE DISTAL PORTION OF THE SHAFT TO PIVOT TO FACILITATE LATERAL ACCESS TO THE OPERATIVESITE.THE INSTRUMENTS ARE PACKAGED WITH A PRIMARY LITHIUM BATTERY PACK THAT MUST BE INSTALLED PRIOR TO USE. THERE ARE SPECIFIC REQUIREMENTS FORDISPOSING OF THE BATTERY PACK. REFER TO THE BATTERY PACK DISPOSAL SECTION.THE INSTRUMENTS ARE PACKAGED WITHOUT A RELOAD AND MUST BE LOADED PRIOR TO USE. A STAPLE RETAINING CAP ON THE RELOAD PROTECTS THE STAPLE LEGPOINTS DURING SHIPPING AND TRANSPORTATION. THE INSTRUMENTS’ LOCK-OUT FEATURE IS DESIGNED TO PREVENT A USED OR IMPROPERLY INSTALLED RELOADFROM BEING REFIRED OR AN INSTRUMENT FROM BEING FIRED WITHOUT A RELOAD.: Brand: ECHELON FLEX

## (undated) DEVICE — C-ARM: Brand: UNBRANDED

## (undated) DEVICE — MEDI-VAC YANKAUER SUCTION HANDLE W/STRAIGHT TIP & CONTROL VENT: Brand: CARDINAL HEALTH

## (undated) DEVICE — INSTRUMENT POUCH: Brand: CONVERTORS

## (undated) DEVICE — DRESSING MEPILEX FOAM BORDER SACRUM 6.3 X 7.9IN

## (undated) DEVICE — SURGI KIT INSTRUMENT ORGANIZER

## (undated) DEVICE — RUNTHROUGH NS EXTRA FLOPPY PTCA GUIDEWIRE: Brand: RUNTHROUGH

## (undated) DEVICE — THE TURNPIKE CATHETERS ARE INTENDED TO BE USED TO ACCESS DISCRETE REGIONS OF THE CORONARY AND/OR PERIPHERAL VASCULATURE. THEY MAY BE USED TO FACILITATE PLACEMENT AND EXCHANGE OF GUIDEWIRES AND TO SUBSELECTIVELY INFUSE/DELIVER DIAGNOSTIC AND THERAPEUTIC AGENTS.: Brand: TURNPIKE® CATHETER

## (undated) DEVICE — SPECIMEN CONTAINER STERILE PEEL PACK

## (undated) DEVICE — GLOVE SRG BIOGEL 7.5

## (undated) DEVICE — SUT VICRYL 0 UR-6 27 IN J603H

## (undated) DEVICE — REM POLYHESIVE ADULT PATIENT RETURN ELECTRODE: Brand: VALLEYLAB

## (undated) DEVICE — CATH GUIDE LAUNCHER 6FR EBU 3.5

## (undated) DEVICE — BONESCALPEL 20MM BLUNT W/TUBESET

## (undated) DEVICE — MARKER REFLECTIVE RADIOPAQUE SPHERE

## (undated) DEVICE — BETHLEHEM UNIVERSAL SPINE, KIT: Brand: CARDINAL HEALTH

## (undated) DEVICE — SPONGE SCRUB 4 PCT CHLORHEXIDINE

## (undated) DEVICE — TROCAR: Brand: KII FIOS FIRST ENTRY

## (undated) DEVICE — Device: Brand: PROWATER

## (undated) DEVICE — HEMOSTATIC MATRIX SURGIFLO 8ML W/THROMBIN

## (undated) DEVICE — CATH DIAG 5FR IMPULSE 100CM FL4

## (undated) DEVICE — DECANTER: Brand: UNBRANDED

## (undated) DEVICE — TOWEL SET X-RAY

## (undated) DEVICE — TREK CORONARY DILATATION CATHETER 3.0 MM X 20 MM / RAPID-EXCHANGE: Brand: TREK

## (undated) DEVICE — DGW .035 FC J3MM 260CM TEF: Brand: EMERALD

## (undated) DEVICE — MICROPUNCTURE INTRODUCER SET SILHOUETTE TRANSITIONLESS PUSH-PLUS DESIGN - STIFFENED CANNULA WITH NITINOL WIRE GUIDE: Brand: MICROPUNCTURE

## (undated) DEVICE — CATH DIAG 5FR IMPULSE 100CM FR4

## (undated) DEVICE — JACKSON TABLE FOAM POSITIONING KIT: Brand: CARDINAL HEALTH

## (undated) DEVICE — PROXIMATE SKIN STAPLERS (35 WIDE) CONTAINS 35 STAINLESS STEEL STAPLES (FIXED HEAD): Brand: PROXIMATE

## (undated) DEVICE — TR BAND RADIAL ARTERY COMPRESSION DEVICE: Brand: TR BAND

## (undated) DEVICE — GLIDESHEATH SLENDER STAINLESS STEEL KIT: Brand: GLIDESHEATH SLENDER

## (undated) DEVICE — INTENDED FOR TISSUE SEPARATION, AND OTHER PROCEDURES THAT REQUIRE A SHARP SURGICAL BLADE TO PUNCTURE OR CUT.: Brand: BARD-PARKER ® CARBON RIB-BACK BLADES

## (undated) DEVICE — HI-TORQUE WHISPER MS GUIDE WIRE .014 STRAIGHT TIP 3.0 CM X 300 CM: Brand: HI-TORQUE WHISPER

## (undated) DEVICE — INTENDED FOR TISSUE SEPARATION, AND OTHER PROCEDURES THAT REQUIRE A SHARP SURGICAL BLADE TO PUNCTURE OR CUT.: Brand: BARD-PARKER SAFETY BLADES SIZE 10, STERILE

## (undated) DEVICE — INTRO SHEATH PEEL AWAY 9 FR

## (undated) DEVICE — INTRO SHEATH PEEL AWAY 7FR

## (undated) DEVICE — PENCIL ELECTROSURG E-Z CLEAN -0035H

## (undated) DEVICE — CORONARY IMAGING CATHETER: Brand: OPTICROSS™ 6 HD

## (undated) DEVICE — TREK CORONARY DILATATION CATHETER 2.50 MM X 15 MM / RAPID-EXCHANGE: Brand: TREK

## (undated) DEVICE — SYRINGE 10ML LL

## (undated) DEVICE — COBAN 1 IN UNSTERILE

## (undated) DEVICE — CATH DIAG 5FR .035 100CM PIG CSC 20

## (undated) DEVICE — GUIDEWIRE WHOLEY HI TORQUE INTERM MOD J .035 145CM

## (undated) DEVICE — DGW .035 FC J3MM 150CM TEF: Brand: EMERALD